# Patient Record
Sex: FEMALE | Race: WHITE | NOT HISPANIC OR LATINO | Employment: OTHER | URBAN - METROPOLITAN AREA
[De-identification: names, ages, dates, MRNs, and addresses within clinical notes are randomized per-mention and may not be internally consistent; named-entity substitution may affect disease eponyms.]

---

## 2017-01-14 ENCOUNTER — HOSPITAL ENCOUNTER (EMERGENCY)
Facility: HOSPITAL | Age: 52
Discharge: HOME | End: 2017-01-14
Payer: COMMERCIAL

## 2017-01-14 ENCOUNTER — APPOINTMENT (EMERGENCY)
Dept: RADIOLOGY | Facility: HOSPITAL | Age: 52
End: 2017-01-14
Payer: COMMERCIAL

## 2017-01-14 VITALS
RESPIRATION RATE: 20 BRPM | SYSTOLIC BLOOD PRESSURE: 187 MMHG | OXYGEN SATURATION: 100 % | HEART RATE: 79 BPM | WEIGHT: 222 LBS | BODY MASS INDEX: 34.77 KG/M2 | TEMPERATURE: 99.8 F | DIASTOLIC BLOOD PRESSURE: 89 MMHG

## 2017-01-14 DIAGNOSIS — S89.92XA KNEE INJURY, LEFT, INITIAL ENCOUNTER: Primary | ICD-10-CM

## 2017-01-14 PROCEDURE — 99284 EMERGENCY DEPT VISIT MOD MDM: CPT

## 2017-01-14 PROCEDURE — 73564 X-RAY EXAM KNEE 4 OR MORE: CPT

## 2017-03-29 ENCOUNTER — ALLSCRIPTS OFFICE VISIT (OUTPATIENT)
Dept: OTHER | Facility: OTHER | Age: 52
End: 2017-03-29

## 2017-04-26 ENCOUNTER — APPOINTMENT (EMERGENCY)
Dept: RADIOLOGY | Facility: HOSPITAL | Age: 52
End: 2017-04-26
Payer: COMMERCIAL

## 2017-04-26 ENCOUNTER — HOSPITAL ENCOUNTER (EMERGENCY)
Facility: HOSPITAL | Age: 52
Discharge: HOME | End: 2017-04-26
Admitting: EMERGENCY MEDICINE
Payer: COMMERCIAL

## 2017-04-26 VITALS
OXYGEN SATURATION: 98 % | RESPIRATION RATE: 18 BRPM | TEMPERATURE: 99 F | DIASTOLIC BLOOD PRESSURE: 92 MMHG | HEART RATE: 58 BPM | SYSTOLIC BLOOD PRESSURE: 143 MMHG

## 2017-04-26 DIAGNOSIS — T14.8XXA MUSCLE STRAIN: Primary | ICD-10-CM

## 2017-04-26 PROCEDURE — 71020 HB CHEST X-RAY 2VW FRONTAL&LATL: CPT

## 2017-04-26 PROCEDURE — 99283 EMERGENCY DEPT VISIT LOW MDM: CPT

## 2017-04-26 PROCEDURE — 73030 X-RAY EXAM OF SHOULDER: CPT

## 2017-04-26 RX ORDER — OXYCODONE HYDROCHLORIDE AND ACETAMINOPHEN 5; 325 MG/1; MG/1
1 TABLET ORAL ONCE
Status: COMPLETED | OUTPATIENT
Start: 2017-04-26 | End: 2017-04-26

## 2017-04-26 RX ADMIN — OXYCODONE HYDROCHLORIDE AND ACETAMINOPHEN 1 TABLET: 5; 325 TABLET ORAL at 13:06

## 2017-05-10 ENCOUNTER — GENERIC CONVERSION - ENCOUNTER (OUTPATIENT)
Dept: OTHER | Facility: OTHER | Age: 52
End: 2017-05-10

## 2017-05-10 ENCOUNTER — APPOINTMENT (EMERGENCY)
Dept: RADIOLOGY | Facility: HOSPITAL | Age: 52
End: 2017-05-10
Payer: COMMERCIAL

## 2017-05-10 ENCOUNTER — HOSPITAL ENCOUNTER (OUTPATIENT)
Facility: HOSPITAL | Age: 52
Setting detail: OBSERVATION
Discharge: HOME | End: 2017-05-11
Attending: EMERGENCY MEDICINE | Admitting: INTERNAL MEDICINE
Payer: COMMERCIAL

## 2017-05-10 DIAGNOSIS — R77.8 ELEVATED TROPONIN: ICD-10-CM

## 2017-05-10 DIAGNOSIS — Z45.02 ICD (IMPLANTABLE CARDIOVERTER-DEFIBRILLATOR) DISCHARGE: Primary | ICD-10-CM

## 2017-05-10 DIAGNOSIS — I10 ESSENTIAL HYPERTENSION: Chronic | ICD-10-CM

## 2017-05-10 DIAGNOSIS — R42 DIZZINESS: ICD-10-CM

## 2017-05-10 LAB
ANION GAP SERPL CALCULATED.3IONS-SCNC: 8 MMOL/L (ref 4–13)
APTT PPP: 37 SECONDS (ref 23–35)
ATRIAL RATE: 60 BPM
BASOPHILS # BLD AUTO: 0.03 THOUSANDS/ΜL (ref 0–0.1)
BASOPHILS NFR BLD AUTO: 1 % (ref 0–1)
BUN SERPL-MCNC: 17 MG/DL (ref 5–25)
CALCIUM SERPL-MCNC: 8.9 MG/DL (ref 8.3–10.1)
CHLORIDE SERPL-SCNC: 103 MMOL/L (ref 100–108)
CO2 SERPL-SCNC: 30 MMOL/L (ref 21–32)
CREAT SERPL-MCNC: 1.1 MG/DL (ref 0.6–1.3)
EOSINOPHIL # BLD AUTO: 0.12 THOUSAND/ΜL (ref 0–0.61)
EOSINOPHIL NFR BLD AUTO: 2 % (ref 0–6)
ERYTHROCYTE [DISTWIDTH] IN BLOOD BY AUTOMATED COUNT: 13.6 % (ref 11.6–15.1)
GFR SERPL CREATININE-BSD FRML MDRD: 52.2 ML/MIN/1.73SQ M
GLUCOSE SERPL-MCNC: 97 MG/DL (ref 65–140)
HCT VFR BLD AUTO: 39.7 % (ref 34.8–46.1)
HGB BLD-MCNC: 13 G/DL (ref 11.5–15.4)
INR PPP: 1.74 (ref 0.86–1.16)
LYMPHOCYTES # BLD AUTO: 1.44 THOUSANDS/ΜL (ref 0.6–4.47)
LYMPHOCYTES NFR BLD AUTO: 24 % (ref 14–44)
MCH RBC QN AUTO: 27.5 PG (ref 26.8–34.3)
MCHC RBC AUTO-ENTMCNC: 32.7 G/DL (ref 31.4–37.4)
MCV RBC AUTO: 84 FL (ref 82–98)
MONOCYTES # BLD AUTO: 0.35 THOUSAND/ΜL (ref 0.17–1.22)
MONOCYTES NFR BLD AUTO: 6 % (ref 4–12)
NEUTROPHILS # BLD AUTO: 4.12 THOUSANDS/ΜL (ref 1.85–7.62)
NEUTS SEG NFR BLD AUTO: 67 % (ref 43–75)
NT-PROBNP SERPL-MCNC: 1573 PG/ML
P AXIS: 62 DEGREES
PLATELET # BLD AUTO: 144 THOUSANDS/UL (ref 149–390)
PMV BLD AUTO: 11.1 FL (ref 8.9–12.7)
POTASSIUM SERPL-SCNC: 4.4 MMOL/L (ref 3.5–5.3)
PR INTERVAL: 132 MS
PROTHROMBIN TIME: 21 SECONDS (ref 12.1–14.4)
QRS AXIS: -6 DEGREES
QRSD INTERVAL: 116 MS
QT INTERVAL: 434 MS
QTC INTERVAL: 434 MS
RBC # BLD AUTO: 4.73 MILLION/UL (ref 3.81–5.12)
SODIUM SERPL-SCNC: 141 MMOL/L (ref 136–145)
T WAVE AXIS: 186 DEGREES
TROPONIN I SERPL-MCNC: 0.05 NG/ML
TROPONIN I SERPL-MCNC: 0.05 NG/ML
TROPONIN I SERPL-MCNC: 0.1 NG/ML
VENTRICULAR RATE: 60 BPM
WBC # BLD AUTO: 6.06 THOUSAND/UL (ref 4.31–10.16)

## 2017-05-10 PROCEDURE — 84484 ASSAY OF TROPONIN QUANT: CPT | Performed by: EMERGENCY MEDICINE

## 2017-05-10 PROCEDURE — 83880 ASSAY OF NATRIURETIC PEPTIDE: CPT | Performed by: EMERGENCY MEDICINE

## 2017-05-10 PROCEDURE — 80048 BASIC METABOLIC PNL TOTAL CA: CPT | Performed by: EMERGENCY MEDICINE

## 2017-05-10 PROCEDURE — 93005 ELECTROCARDIOGRAM TRACING: CPT

## 2017-05-10 PROCEDURE — 99285 EMERGENCY DEPT VISIT HI MDM: CPT

## 2017-05-10 PROCEDURE — 71010 HB CHEST X-RAY 1 VIEW FRONTAL (PORTABLE): CPT

## 2017-05-10 PROCEDURE — 85025 COMPLETE CBC W/AUTO DIFF WBC: CPT | Performed by: EMERGENCY MEDICINE

## 2017-05-10 PROCEDURE — 36415 COLL VENOUS BLD VENIPUNCTURE: CPT | Performed by: EMERGENCY MEDICINE

## 2017-05-10 PROCEDURE — 85730 THROMBOPLASTIN TIME PARTIAL: CPT | Performed by: EMERGENCY MEDICINE

## 2017-05-10 PROCEDURE — 85610 PROTHROMBIN TIME: CPT | Performed by: EMERGENCY MEDICINE

## 2017-05-10 PROCEDURE — 84484 ASSAY OF TROPONIN QUANT: CPT | Performed by: PHYSICIAN ASSISTANT

## 2017-05-10 RX ORDER — MELOXICAM 15 MG/1
7.5 TABLET ORAL DAILY
COMMUNITY
End: 2018-12-26

## 2017-05-10 RX ORDER — FAMOTIDINE 20 MG/1
20 TABLET, FILM COATED ORAL DAILY
COMMUNITY
End: 2020-05-11 | Stop reason: ALTCHOICE

## 2017-05-10 RX ORDER — LISINOPRIL 10 MG/1
10 TABLET ORAL DAILY
Status: DISCONTINUED | OUTPATIENT
Start: 2017-05-11 | End: 2017-05-11 | Stop reason: HOSPADM

## 2017-05-10 RX ORDER — HYDROCHLOROTHIAZIDE 12.5 MG/1
12.5 TABLET ORAL DAILY
Status: DISCONTINUED | OUTPATIENT
Start: 2017-05-10 | End: 2017-05-11

## 2017-05-10 RX ORDER — AMLODIPINE BESYLATE 10 MG/1
10 TABLET ORAL DAILY
Status: DISCONTINUED | OUTPATIENT
Start: 2017-05-11 | End: 2017-05-11 | Stop reason: HOSPADM

## 2017-05-10 RX ORDER — ASPIRIN 325 MG
325 TABLET ORAL ONCE
Status: COMPLETED | OUTPATIENT
Start: 2017-05-10 | End: 2017-05-10

## 2017-05-10 RX ORDER — ACETAMINOPHEN 325 MG/1
650 TABLET ORAL EVERY 6 HOURS PRN
Status: DISCONTINUED | OUTPATIENT
Start: 2017-05-10 | End: 2017-05-11 | Stop reason: HOSPADM

## 2017-05-10 RX ORDER — NICOTINE 21 MG/24HR
1 PATCH, TRANSDERMAL 24 HOURS TRANSDERMAL DAILY
Status: DISCONTINUED | OUTPATIENT
Start: 2017-05-11 | End: 2017-05-10

## 2017-05-10 RX ORDER — METOPROLOL SUCCINATE 25 MG/1
25 TABLET, EXTENDED RELEASE ORAL 2 TIMES DAILY
Status: DISCONTINUED | OUTPATIENT
Start: 2017-05-10 | End: 2017-05-11 | Stop reason: HOSPADM

## 2017-05-10 RX ORDER — NICOTINE 21 MG/24HR
1 PATCH, TRANSDERMAL 24 HOURS TRANSDERMAL DAILY
Status: DISCONTINUED | OUTPATIENT
Start: 2017-05-10 | End: 2017-05-11 | Stop reason: HOSPADM

## 2017-05-10 RX ORDER — METOPROLOL SUCCINATE 25 MG/1
25 TABLET, EXTENDED RELEASE ORAL 2 TIMES DAILY
COMMUNITY
End: 2017-09-12

## 2017-05-10 RX ORDER — FAMOTIDINE 20 MG/1
20 TABLET, FILM COATED ORAL DAILY
Status: DISCONTINUED | OUTPATIENT
Start: 2017-05-11 | End: 2017-05-11 | Stop reason: HOSPADM

## 2017-05-10 RX ADMIN — NICOTINE 1 PATCH: 21 PATCH, EXTENDED RELEASE TRANSDERMAL at 18:12

## 2017-05-10 RX ADMIN — METOPROLOL SUCCINATE 25 MG: 25 TABLET, EXTENDED RELEASE ORAL at 18:12

## 2017-05-10 RX ADMIN — ACETAMINOPHEN 650 MG: 325 TABLET, FILM COATED ORAL at 16:36

## 2017-05-10 RX ADMIN — ASPIRIN 325 MG: 325 TABLET ORAL at 11:26

## 2017-05-11 VITALS
HEIGHT: 67 IN | SYSTOLIC BLOOD PRESSURE: 158 MMHG | BODY MASS INDEX: 33.91 KG/M2 | WEIGHT: 216.05 LBS | OXYGEN SATURATION: 94 % | HEART RATE: 59 BPM | DIASTOLIC BLOOD PRESSURE: 78 MMHG | TEMPERATURE: 98 F | RESPIRATION RATE: 16 BRPM

## 2017-05-11 LAB
ANION GAP SERPL CALCULATED.3IONS-SCNC: 8 MMOL/L (ref 4–13)
BUN SERPL-MCNC: 17 MG/DL (ref 5–25)
CALCIUM SERPL-MCNC: 8.7 MG/DL (ref 8.3–10.1)
CHLORIDE SERPL-SCNC: 104 MMOL/L (ref 100–108)
CO2 SERPL-SCNC: 28 MMOL/L (ref 21–32)
CREAT SERPL-MCNC: 1.14 MG/DL (ref 0.6–1.3)
GFR SERPL CREATININE-BSD FRML MDRD: 50.1 ML/MIN/1.73SQ M
GLUCOSE P FAST SERPL-MCNC: 106 MG/DL (ref 65–99)
GLUCOSE SERPL-MCNC: 106 MG/DL (ref 65–140)
POTASSIUM SERPL-SCNC: 4.1 MMOL/L (ref 3.5–5.3)
SODIUM SERPL-SCNC: 140 MMOL/L (ref 136–145)

## 2017-05-11 PROCEDURE — 80048 BASIC METABOLIC PNL TOTAL CA: CPT | Performed by: NURSE PRACTITIONER

## 2017-05-11 RX ORDER — HYDROCHLOROTHIAZIDE 12.5 MG/1
12.5 TABLET ORAL DAILY
Status: DISCONTINUED | OUTPATIENT
Start: 2017-05-11 | End: 2017-05-11 | Stop reason: HOSPADM

## 2017-05-11 RX ORDER — NICOTINE 21 MG/24HR
1 PATCH, TRANSDERMAL 24 HOURS TRANSDERMAL DAILY
Qty: 30 PATCH | Refills: 0 | Status: SHIPPED | OUTPATIENT
Start: 2017-05-11 | End: 2018-08-06

## 2017-05-11 RX ORDER — HYDROCHLOROTHIAZIDE 12.5 MG/1
12.5 TABLET ORAL DAILY
Qty: 30 TABLET | Refills: 0 | Status: SHIPPED | OUTPATIENT
Start: 2017-05-11 | End: 2018-12-26

## 2017-05-11 RX ADMIN — RIVAROXABAN 20 MG: 20 TABLET, FILM COATED ORAL at 08:24

## 2017-05-11 RX ADMIN — METOPROLOL SUCCINATE 25 MG: 25 TABLET, EXTENDED RELEASE ORAL at 08:24

## 2017-05-11 RX ADMIN — AMLODIPINE BESYLATE 10 MG: 10 TABLET ORAL at 08:24

## 2017-05-11 RX ADMIN — HYDROCHLOROTHIAZIDE 12.5 MG: 12.5 TABLET ORAL at 08:24

## 2017-05-11 RX ADMIN — NICOTINE 1 PATCH: 21 PATCH, EXTENDED RELEASE TRANSDERMAL at 08:23

## 2017-05-11 RX ADMIN — ACETAMINOPHEN 650 MG: 325 TABLET, FILM COATED ORAL at 05:29

## 2017-05-11 RX ADMIN — FAMOTIDINE 20 MG: 20 TABLET ORAL at 08:24

## 2017-05-11 RX ADMIN — LISINOPRIL 10 MG: 10 TABLET ORAL at 08:24

## 2017-07-27 ENCOUNTER — ALLSCRIPTS OFFICE VISIT (OUTPATIENT)
Dept: OTHER | Facility: OTHER | Age: 52
End: 2017-07-27

## 2017-08-19 ENCOUNTER — HOSPITAL ENCOUNTER (EMERGENCY)
Facility: HOSPITAL | Age: 52
Discharge: HOME | End: 2017-08-19
Attending: EMERGENCY MEDICINE
Payer: COMMERCIAL

## 2017-08-19 ENCOUNTER — APPOINTMENT (EMERGENCY)
Dept: RADIOLOGY | Facility: HOSPITAL | Age: 52
End: 2017-08-19
Payer: COMMERCIAL

## 2017-08-19 VITALS
DIASTOLIC BLOOD PRESSURE: 83 MMHG | WEIGHT: 216.8 LBS | BODY MASS INDEX: 34.03 KG/M2 | SYSTOLIC BLOOD PRESSURE: 143 MMHG | OXYGEN SATURATION: 98 % | RESPIRATION RATE: 18 BRPM | HEIGHT: 67 IN | HEART RATE: 60 BPM | TEMPERATURE: 99.2 F
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
## 2017-08-19 DIAGNOSIS — S30.0XXA CONTUSION OF BUTTOCK, INITIAL ENCOUNTER: ICD-10-CM

## 2017-08-19 PROCEDURE — 99284 EMERGENCY DEPT VISIT MOD MDM: CPT

## 2017-08-19 PROCEDURE — 73502 X-RAY EXAM HIP UNI 2-3 VIEWS: CPT

## 2017-08-19 RX ORDER — MORPHINE SULFATE 30 MG/1
30 TABLET ORAL EVERY 4 HOURS PRN
Qty: 5 TABLET | Refills: 0 | Status: ON HOLD | OUTPATIENT
Start: 2017-08-19 | End: 2018-05-02

## 2017-08-19 RX ORDER — MORPHINE SULFATE 15 MG/1
30 TABLET ORAL ONCE
Status: COMPLETED | OUTPATIENT
Start: 2017-08-19 | End: 2017-08-19

## 2017-08-19 RX ADMIN — MORPHINE SULFATE 30 MG: 15 TABLET ORAL at 15:30

## 2017-09-12 ENCOUNTER — HOSPITAL ENCOUNTER (EMERGENCY)
Facility: HOSPITAL | Age: 52
Discharge: HOME | End: 2017-09-12
Attending: EMERGENCY MEDICINE | Admitting: EMERGENCY MEDICINE
Payer: COMMERCIAL

## 2017-09-12 VITALS
TEMPERATURE: 98.5 F | RESPIRATION RATE: 18 BRPM | SYSTOLIC BLOOD PRESSURE: 142 MMHG | HEART RATE: 75 BPM | WEIGHT: 215.83 LBS | BODY MASS INDEX: 33.8 KG/M2 | DIASTOLIC BLOOD PRESSURE: 81 MMHG | OXYGEN SATURATION: 95 %

## 2017-09-12 DIAGNOSIS — Z45.02 DEFIBRILLATOR DISCHARGE: ICD-10-CM

## 2017-09-12 DIAGNOSIS — I48.91 ATRIAL FIBRILLATION WITH RVR (HCC): Primary | ICD-10-CM

## 2017-09-12 LAB
ALBUMIN SERPL BCP-MCNC: 3.8 G/DL (ref 3.5–5)
ALP SERPL-CCNC: 67 U/L (ref 46–116)
ALT SERPL W P-5'-P-CCNC: 39 U/L (ref 12–78)
ANION GAP SERPL CALCULATED.3IONS-SCNC: 10 MMOL/L (ref 4–13)
APTT PPP: 40 SECONDS (ref 23–35)
AST SERPL W P-5'-P-CCNC: 27 U/L (ref 5–45)
ATRIAL RATE: 76 BPM
BASOPHILS # BLD AUTO: 0.02 THOUSANDS/ΜL (ref 0–0.1)
BASOPHILS NFR BLD AUTO: 0 % (ref 0–1)
BILIRUB SERPL-MCNC: 0.3 MG/DL (ref 0.2–1)
BUN SERPL-MCNC: 22 MG/DL (ref 5–25)
CALCIUM SERPL-MCNC: 9.3 MG/DL (ref 8.3–10.1)
CHLORIDE SERPL-SCNC: 105 MMOL/L (ref 100–108)
CO2 SERPL-SCNC: 26 MMOL/L (ref 21–32)
CREAT SERPL-MCNC: 1.57 MG/DL (ref 0.6–1.3)
EOSINOPHIL # BLD AUTO: 0.06 THOUSAND/ΜL (ref 0–0.61)
EOSINOPHIL NFR BLD AUTO: 1 % (ref 0–6)
ERYTHROCYTE [DISTWIDTH] IN BLOOD BY AUTOMATED COUNT: 13.1 % (ref 11.6–15.1)
GFR SERPL CREATININE-BSD FRML MDRD: 38 ML/MIN/1.73SQ M
GLUCOSE SERPL-MCNC: 100 MG/DL (ref 65–140)
HCT VFR BLD AUTO: 36.3 % (ref 34.8–46.1)
HGB BLD-MCNC: 11.9 G/DL (ref 11.5–15.4)
INR PPP: 2.15 (ref 0.86–1.16)
LYMPHOCYTES # BLD AUTO: 1.35 THOUSANDS/ΜL (ref 0.6–4.47)
LYMPHOCYTES NFR BLD AUTO: 21 % (ref 14–44)
MCH RBC QN AUTO: 27.9 PG (ref 26.8–34.3)
MCHC RBC AUTO-ENTMCNC: 32.8 G/DL (ref 31.4–37.4)
MCV RBC AUTO: 85 FL (ref 82–98)
MONOCYTES # BLD AUTO: 0.49 THOUSAND/ΜL (ref 0.17–1.22)
MONOCYTES NFR BLD AUTO: 8 % (ref 4–12)
NEUTROPHILS # BLD AUTO: 4.64 THOUSANDS/ΜL (ref 1.85–7.62)
NEUTS SEG NFR BLD AUTO: 70 % (ref 43–75)
P AXIS: 70 DEGREES
PLATELET # BLD AUTO: 143 THOUSANDS/UL (ref 149–390)
PMV BLD AUTO: 11.5 FL (ref 8.9–12.7)
POTASSIUM SERPL-SCNC: 3.7 MMOL/L (ref 3.5–5.3)
PR INTERVAL: 148 MS
PROT SERPL-MCNC: 7.6 G/DL (ref 6.4–8.2)
PROTHROMBIN TIME: 24.8 SECONDS (ref 12.1–14.4)
QRS AXIS: -19 DEGREES
QRSD INTERVAL: 114 MS
QT INTERVAL: 386 MS
QTC INTERVAL: 434 MS
RBC # BLD AUTO: 4.26 MILLION/UL (ref 3.81–5.12)
SODIUM SERPL-SCNC: 141 MMOL/L (ref 136–145)
T WAVE AXIS: 147 DEGREES
TROPONIN I SERPL-MCNC: 0.03 NG/ML
VENTRICULAR RATE: 76 BPM
WBC # BLD AUTO: 6.56 THOUSAND/UL (ref 4.31–10.16)

## 2017-09-12 PROCEDURE — 80053 COMPREHEN METABOLIC PANEL: CPT | Performed by: EMERGENCY MEDICINE

## 2017-09-12 PROCEDURE — 96374 THER/PROPH/DIAG INJ IV PUSH: CPT

## 2017-09-12 PROCEDURE — 96375 TX/PRO/DX INJ NEW DRUG ADDON: CPT

## 2017-09-12 PROCEDURE — 85730 THROMBOPLASTIN TIME PARTIAL: CPT | Performed by: EMERGENCY MEDICINE

## 2017-09-12 PROCEDURE — 36415 COLL VENOUS BLD VENIPUNCTURE: CPT | Performed by: EMERGENCY MEDICINE

## 2017-09-12 PROCEDURE — 93005 ELECTROCARDIOGRAM TRACING: CPT | Performed by: EMERGENCY MEDICINE

## 2017-09-12 PROCEDURE — 85025 COMPLETE CBC W/AUTO DIFF WBC: CPT | Performed by: EMERGENCY MEDICINE

## 2017-09-12 PROCEDURE — 99284 EMERGENCY DEPT VISIT MOD MDM: CPT

## 2017-09-12 PROCEDURE — 96361 HYDRATE IV INFUSION ADD-ON: CPT

## 2017-09-12 PROCEDURE — 93005 ELECTROCARDIOGRAM TRACING: CPT

## 2017-09-12 PROCEDURE — 85610 PROTHROMBIN TIME: CPT | Performed by: EMERGENCY MEDICINE

## 2017-09-12 PROCEDURE — 84484 ASSAY OF TROPONIN QUANT: CPT | Performed by: EMERGENCY MEDICINE

## 2017-09-12 RX ORDER — METOCLOPRAMIDE HYDROCHLORIDE 5 MG/ML
10 INJECTION INTRAMUSCULAR; INTRAVENOUS ONCE
Status: COMPLETED | OUTPATIENT
Start: 2017-09-12 | End: 2017-09-12

## 2017-09-12 RX ORDER — KETOROLAC TROMETHAMINE 30 MG/ML
15 INJECTION, SOLUTION INTRAMUSCULAR; INTRAVENOUS ONCE
Status: COMPLETED | OUTPATIENT
Start: 2017-09-12 | End: 2017-09-12

## 2017-09-12 RX ORDER — METOPROLOL SUCCINATE 25 MG/1
25 TABLET, EXTENDED RELEASE ORAL 2 TIMES DAILY
Qty: 20 TABLET | Refills: 0 | Status: ON HOLD | OUTPATIENT
Start: 2017-09-12 | End: 2018-05-02

## 2017-09-12 RX ORDER — DIPHENHYDRAMINE HYDROCHLORIDE 50 MG/ML
25 INJECTION INTRAMUSCULAR; INTRAVENOUS ONCE
Status: COMPLETED | OUTPATIENT
Start: 2017-09-12 | End: 2017-09-12

## 2017-09-12 RX ADMIN — DIPHENHYDRAMINE HYDROCHLORIDE 25 MG: 50 INJECTION, SOLUTION INTRAMUSCULAR; INTRAVENOUS at 16:23

## 2017-09-12 RX ADMIN — SODIUM CHLORIDE 1000 ML: 0.9 INJECTION, SOLUTION INTRAVENOUS at 16:26

## 2017-09-12 RX ADMIN — KETOROLAC TROMETHAMINE 15 MG: 30 INJECTION, SOLUTION INTRAMUSCULAR at 16:23

## 2017-09-12 RX ADMIN — METOCLOPRAMIDE 10 MG: 5 INJECTION, SOLUTION INTRAMUSCULAR; INTRAVENOUS at 16:23

## 2017-11-09 ENCOUNTER — ALLSCRIPTS OFFICE VISIT (OUTPATIENT)
Dept: OTHER | Facility: OTHER | Age: 52
End: 2017-11-09

## 2018-01-12 VITALS
HEIGHT: 68 IN | BODY MASS INDEX: 32.37 KG/M2 | DIASTOLIC BLOOD PRESSURE: 88 MMHG | WEIGHT: 213.56 LBS | SYSTOLIC BLOOD PRESSURE: 136 MMHG | HEART RATE: 60 BPM

## 2018-02-14 ENCOUNTER — CLINICAL SUPPORT (OUTPATIENT)
Dept: CARDIOLOGY CLINIC | Facility: CLINIC | Age: 53
End: 2018-02-14
Payer: COMMERCIAL

## 2018-02-14 DIAGNOSIS — I47.2 VT (VENTRICULAR TACHYCARDIA) (HCC): Primary | ICD-10-CM

## 2018-02-14 DIAGNOSIS — Z95.810 PRESENCE OF IMPLANTABLE CARDIOVERTER-DEFIBRILLATOR (ICD): ICD-10-CM

## 2018-02-14 PROCEDURE — 93296 REM INTERROG EVL PM/IDS: CPT | Performed by: INTERNAL MEDICINE

## 2018-02-14 PROCEDURE — 93295 DEV INTERROG REMOTE 1/2/MLT: CPT | Performed by: INTERNAL MEDICINE

## 2018-02-14 NOTE — PROGRESS NOTES
CARELINK TRANSMISSION:  BATTERY VOLTAGE ADEQUATE (9 5 YR)   AP 39 8%  < 0 1%   ALL AVAILABLE LEAD PARAMETERS WITHIN NORMAL LIMITS   2 NEW AT/AF RECORDINGS (0 2%, LONGEST 4 HR) WITH BOTH EGMS SHOWING AF   OPTI-VOL WITHIN NORMAL LIMITS   NORMAL DEVICE FUNCTION   RG      Current Outpatient Prescriptions:     amLODIPine (NORVASC) 10 mg tablet, Take 10 mg by mouth daily  , Disp: , Rfl:     famotidine (PEPCID) 20 mg tablet, Take 20 mg by mouth daily, Disp: , Rfl:     hydrochlorothiazide (HYDRODIURIL) 12 5 mg tablet, Take 1 tablet by mouth daily for 30 days, Disp: 30 tablet, Rfl: 0    lisinopril (ZESTRIL) 10 mg tablet, Take 10 mg by mouth daily  , Disp: , Rfl:     meloxicam (MOBIC) 15 mg tablet, Take 7 5 mg by mouth daily, Disp: , Rfl:     metoprolol succinate (TOPROL-XL) 25 mg 24 hr tablet, Take 1 tablet by mouth 2 (two) times a day, Disp: 20 tablet, Rfl: 0    morphine (MSIR) 30 MG tablet, Take 1 tablet by mouth every 4 (four) hours as needed for severe pain for up to 5 doses Max Daily Amount: 180 mg, Disp: 5 tablet, Rfl: 0    nicotine (NICODERM CQ) 21 mg/24 hr TD 24 hr patch, Place 1 patch on the skin daily for 30 days, Disp: 30 patch, Rfl: 0    rivaroxaban (XARELTO) 20 mg tablet, Take 20 mg by mouth daily with breakfast  , Disp: , Rfl:

## 2018-03-07 NOTE — PROGRESS NOTES
"  Discussion/Summary  Normal device function     A fib episode lasting 53 sec  Results/Data  Cardiac Device In Clinic [id number] 09:03PM Alex Du     Test Name Result Flag Reference   MISCELLANEOUS COMMENT (Report)     DEVICE INTERROGATED IN THE [facility] OFFICE: BATTERY VOLTAGE ADEQUATE (9 7 YR)  AP 50 5%  < 0 1%  ALL LEAD PARAMETERS WITHIN NORMAL LIMITS  1 AF EPISODE LASTING 53 SEC    NO OTHER HIGH RATE EPISODES  OPTI-VOL WITHIN NORMAL LIMITS  NO PROGRAMMING CHANGES MADE TO DEVICE PARAMETERS  NORMAL ICD FUNCTION  RG   Cardiac Electrophysiology Report      [id number] [id number] [id number]  pdf   DEVICE TYPE ICD       Cardiac Electrophysiology Report [id number] 09:03PM Alex Du     Test Name Result Flag Reference   Cardiac Electrophysiology Report      [id number]  pdf     Signatures   Electronically signed by : Franklyn Prather, ; Nov 9 2017  3:18PM EST                       (Author)    Electronically signed by : EVA Tracy ; Nov 11 2017  9:37AM EST                       (Author)    "

## 2018-03-07 NOTE — PROGRESS NOTES
"  Discussion/Summary  Normal device function      Results/Data  Cardiac Device In Clinic [id number] 05:41PM Jacek Lara     Test Name Result Flag Reference   MISCELLANEOUS COMMENT (Report)     DEVICE INTERROGATED IN THE [facility] OFFICE: BATTERY VOLTAGE ADEQUATE (PRICE - 9 8 YRS); AP = 42%,  = <1%; (3) AT/AF EPISODES FOR PAF NOTED WITH LONGEST DURATION @ 11 HRS; RVR ALSO NOTED DURING EPISODES RECORDED WITH V RATES >100 BPM; PT STATES SHE WAS SEEN @ [facility] w LAST DOCUMENTED EPISODE (10/28/16) - TREATED & RELEASED FOR RAPID PAF/RVR; ALL LEAD PARAMETERS TEST WITHIN NORMAL LIMITS/STABLE; DECREASE MADE TO BOTH RA & RV AMPLITUDE TO PROMOTE DEVICE LONGEVITY WHILE MAINTAINING AN APPROPRIATE SAFETY MARGIN; OPTI-VOL WITHIN NORMAL LIMITS; COMPLIANCE WITH XARELTO & METOPROLOL REVIEWED WITH PATIENT - STATES w/o RX REFILLS AND NONE TAKEN x3 DAYS; REFILLS REQUEST FOR PATIENT PROCESSED; APPROPRIATELY FUNCTIONING ICD  eb   Cardiac Electrophysiology Report      [id number] [id number] [id number]  pdf   DEVICE TYPE ICD       Cardiac Electrophysiology Report [id number] 05:41PM Jacek Lara     Test Name Result Flag Reference   Cardiac Electrophysiology Report      [id number]  pdf     Signatures   Electronically signed by : Alvaro Gonzalez, ; Nov 15 2016 12:52PM EST                       (Author)    Electronically signed by : EVA Martin ; Nov 15 2016  4:39PM EST                       (Author)    "

## 2018-03-07 NOTE — PROGRESS NOTES
"  Discussion/Summary  Normal device function      Results/Data  Cardiac Device In Clinic [id number] 06:22PM Zelphia Lung     Test Name Result Flag Reference   MISCELLANEOUS COMMENT (Report)     WOUND CHECK: INCISION CLEAN AND DRY WITH EDGES APPROXIMATED; WOUND CARE AND RESTRICTIONS REVIEWED WITH PATIENT  PT C/O SORENESS  ADVISED BY DR Taras Tracy TO TAKE MOTRIN & TYLENOL  DEVICE INTERROGATED IN THE [place] OFFICE: BATTERY VOLTAGE ADEQUATE  AP 48 3%  0%  1 AT/AF NOTED, 5:52 HRS  PT WAS SEEN AT [facility] FOR IT  CLAIMS SEEN BY REP & OVERNIGHT STAY  ON [address] [place] [address]  ALL LEAD PARAMETERS & TRENDS WITHIN NORMAL LIMITS  DR Taras Tracy INCREASED NIDS  OPTI-VOL FLUID THRESHOLD BEING INITIATED  NORMAL DEVICE FUNCTION  NC   Cardiac Electrophysiology Report      [id number] [id number] [id number]  pdf   DEVICE TYPE ICD       Cardiac Electrophysiology Report [id number] 06:22PM Zelphia Lung     Test Name Result Flag Reference   Cardiac Electrophysiology Report      [id number]  pdf     Signatures   Electronically signed by : Rina Leo, ; Aug  1 2016 10:35AM EST                       (Author)    Electronically signed by : EVA Champion ; Aug  1 2016 12:44PM EST                       (Author)    "

## 2018-05-01 ENCOUNTER — ANESTHESIA EVENT (OUTPATIENT)
Dept: GASTROENTEROLOGY | Facility: HOSPITAL | Age: 53
End: 2018-05-01
Payer: COMMERCIAL

## 2018-05-01 NOTE — ANESTHESIA PREPROCEDURE EVALUATION
Review of Systems/Medical History  Patient summary reviewed  Chart reviewed  No history of anesthetic complications     Cardiovascular  Pacemaker/AICD (ICD), Hyperlipidemia, Hypertension , Dysrhythmias (Hx A fib) ,   Comment: Hx hypertrophic cardiomyopathy--S/P ICD,  Pulmonary  Smoker (1/2 ppd) cigarette smoker  ,        GI/Hepatic    GERD , Hepatitis C,   Comment: Vomiting/abdominal pain     Negative  ROS        Endo/Other  Negative endo/other ROS      GYN  Negative gynecology ROS          Hematology  Negative hematology ROS      Musculoskeletal  Negative musculoskeletal ROS        Neurology  Negative neurology ROS      Psychology   Negative psychology ROS              Physical Exam    Airway    Mallampati score: II  TM Distance: >3 FB       Dental   Comment: Edentulous,     Cardiovascular  Rhythm: regular, Murmur,     Pulmonary  Breath sounds clear to auscultation,     Other Findings  Hoarse      Anesthesia Plan  ASA Score- 3     Anesthesia Type- IV sedation with anesthesia with ASA Monitors  Additional Monitors:   Airway Plan:         Plan Factors-    Induction- intravenous  Postoperative Plan-     Informed Consent- Anesthetic plan and risks discussed with patient  I personally reviewed this patient with the CRNA  Discussed and agreed on the Anesthesia Plan with the CRNA  Bryce Laird

## 2018-05-02 ENCOUNTER — ANESTHESIA (OUTPATIENT)
Dept: GASTROENTEROLOGY | Facility: HOSPITAL | Age: 53
End: 2018-05-02
Payer: COMMERCIAL

## 2018-05-02 ENCOUNTER — HOSPITAL ENCOUNTER (OUTPATIENT)
Facility: HOSPITAL | Age: 53
Setting detail: OUTPATIENT SURGERY
Discharge: HOME | End: 2018-05-02
Attending: INTERNAL MEDICINE | Admitting: INTERNAL MEDICINE
Payer: COMMERCIAL

## 2018-05-02 VITALS
HEART RATE: 60 BPM | DIASTOLIC BLOOD PRESSURE: 75 MMHG | TEMPERATURE: 98.5 F | BODY MASS INDEX: 35.61 KG/M2 | RESPIRATION RATE: 16 BRPM | WEIGHT: 235 LBS | HEIGHT: 68 IN | OXYGEN SATURATION: 99 % | SYSTOLIC BLOOD PRESSURE: 140 MMHG

## 2018-05-02 DIAGNOSIS — K21.9 GERD (GASTROESOPHAGEAL REFLUX DISEASE): ICD-10-CM

## 2018-05-02 PROCEDURE — 88305 TISSUE EXAM BY PATHOLOGIST: CPT | Performed by: PATHOLOGY

## 2018-05-02 RX ORDER — PROPOFOL 10 MG/ML
INJECTION, EMULSION INTRAVENOUS CONTINUOUS PRN
Status: DISCONTINUED | OUTPATIENT
Start: 2018-05-02 | End: 2018-05-02 | Stop reason: SURG

## 2018-05-02 RX ORDER — PROPOFOL 10 MG/ML
INJECTION, EMULSION INTRAVENOUS AS NEEDED
Status: DISCONTINUED | OUTPATIENT
Start: 2018-05-02 | End: 2018-05-02 | Stop reason: SURG

## 2018-05-02 RX ORDER — FENTANYL CITRATE 50 UG/ML
INJECTION, SOLUTION INTRAMUSCULAR; INTRAVENOUS AS NEEDED
Status: DISCONTINUED | OUTPATIENT
Start: 2018-05-02 | End: 2018-05-02 | Stop reason: SURG

## 2018-05-02 RX ORDER — SODIUM CHLORIDE 9 MG/ML
50 INJECTION, SOLUTION INTRAVENOUS CONTINUOUS
Status: DISCONTINUED | OUTPATIENT
Start: 2018-05-02 | End: 2018-05-02 | Stop reason: HOSPADM

## 2018-05-02 RX ADMIN — PROPOFOL 120 MG: 10 INJECTION, EMULSION INTRAVENOUS at 13:17

## 2018-05-02 RX ADMIN — SODIUM CHLORIDE 50 ML/HR: 0.9 INJECTION, SOLUTION INTRAVENOUS at 12:07

## 2018-05-02 RX ADMIN — PROPOFOL 150 MCG/KG/MIN: 10 INJECTION, EMULSION INTRAVENOUS at 13:17

## 2018-05-02 RX ADMIN — SODIUM CHLORIDE: 0.9 INJECTION, SOLUTION INTRAVENOUS at 13:07

## 2018-05-02 RX ADMIN — FENTANYL CITRATE 160 MCG: 50 INJECTION, SOLUTION INTRAMUSCULAR; INTRAVENOUS at 13:22

## 2018-05-02 RX ADMIN — TOPICAL ANESTHETIC 1 SPRAY: 200 SPRAY DENTAL; PERIODONTAL at 13:12

## 2018-05-02 NOTE — OP NOTE
**** GI/ENDOSCOPY REPORT ****     PATIENT NAME: CHAD UREÑA - VISIT ID:     INTRODUCTION: Esophagogastroduodenoscopy - A 48 female patient presents   for an outpatient Esophagogastroduodenoscopy at [address] [place] [address] 59  N  INDICATIONS: Pain located in the epigastrium  Vomiting  CONSENT: The benefits, risks, and alternatives to the procedure were   discussed and informed consent was obtained from the patient  PREPARATION:  EKG, pulse, pulse oximetry and blood pressure were monitored   throughout the procedure  MEDICATIONS: Anesthesia-check records     PROCEDURE:  The endoscope was passed with ease under direct visualization   and advanced to the 3rd portion of the duodenum  The scope was withdrawn   and the mucosa was carefully examined  The views were excellent  The   patient's toleration of the procedure was excellent  FINDINGS:  In the larynx there was a small polyp on the vocal cords  Esophagus: Mild, striped, erosive, reflux-induced esophagitis was found in   the distal third of the esophagus  A possible tongue of Thayer's   esophagus was found, spanning, 2 cm beginning 36 cm from the entry site  Three biopsies was taken (jar 2)  Otherwise, the esophagus appeared to be   normal  There was no evidence of stenosis or rings or bands in the   esophagus  Stomach: The stomach appeared to be normal   Duodenum:   Possibly mild "scalloping" of the mucosa  Four biopsies was taken (jar 1)  Otherwise, the duodenum appeared to be normal      COMPLICATIONS: There were no complications  IMPRESSIONS: In the larynx there was a small polyp on the vocal cords  Mild esophagitis seen in the distal third of the esophagus  Thayer's   esophagus noted  Three biopsies taken  No evidence of stenosis and rings   or bands in the esophagus  Normal stomach  Possibly mild "scalloping" of   the mucosa  Four biopsies taken       ESTIMATED BLOOD LOSS:     RECOMMENDATIONS: Call   Sugar Cotton [phone] with questions or   problems  Follow-up appointment with Dr Sugar Cotton in 2-4 week  Follow-up on the results of the biopsy specimens  ENT consultation   regarding vocal cord polyp, Dr Kathya Brunner [phone] please call for the   appointment  PROCEDURE CODES: 14283 - EGD flexible; with biopsy     ICD-9 Codes: 789 06 Abdominal pain, epigastric 787 03 Vomiting alone    530 85 Thayer's esophagus     ICD-10 Codes: R10 13 Epigastric pain R11 1 Vomiting K20 9 Esophagitis,   unspecified K22 7 Thayer's esophagus     PERFORMED BY: EVA Trevizo  on 05/02/2018  Version 1, electronically signed by EVA Trevizo  on   05/02/2018 at 13:38

## 2018-05-02 NOTE — H&P
History and Physical - [facility]  Leslye Wells 48 y o  female MRN: [id number]                  HPI: Leslye Wells is a 48y o  year old female who presents for upper abdominal pain, regurgitation, vomiting  REVIEW OF SYSTEMS: Per the HPI, and otherwise unremarkable  Historical Information   Past Medical History:   Diagnosis Date    Atrial fibrillation ([facility] [address] )     Atrial fibrillation (HCC)     GERD (gastroesophageal reflux disease)     Hepatitis C     Hyperlipidemia     Hypertension     Pacemaker      Past Surgical History:   Procedure Laterality Date    CARDIAC PACEMAKER PLACEMENT      CHOLECYSTECTOMY      ELBOW SURGERY      HYSTERECTOMY      HYSTERECTOMY      KNEE SURGERY Left     REPLACEMENT TOTAL KNEE Left      Social History   History   Alcohol Use No     History   Drug Use No     History   Smoking Status    Current Every Day Smoker    Packs/day: 0 50    Years: 0 00    Types: Cigarettes   Smokeless Tobacco    Current User     History reviewed  No pertinent family history  Meds/Allergies     Prescriptions Prior to Admission   Medication    amLODIPine (NORVASC) 10 mg tablet    lisinopril (ZESTRIL) 10 mg tablet    meloxicam (MOBIC) 15 mg tablet    rivaroxaban (XARELTO) 20 mg tablet    famotidine (PEPCID) 20 mg tablet    hydrochlorothiazide (HYDRODIURIL) 12 5 mg tablet    nicotine (NICODERM CQ) 21 mg/24 hr TD 24 hr patch       Allergies   Allergen Reactions    Iodinated Diagnostic Agents     Tramadol        Objective     Blood pressure 137/73, pulse 64, temperature 98 5 °F (36 9 °C), temperature source Temporal, resp  rate 20, height 5' 7 5" (1 715 m), weight 107 kg (235 lb), SpO2 98 %, not currently breastfeeding  PHYSICAL EXAM    Gen: NAD  CV: RRR  CHEST: Clear  ABD: soft, NT/ND  EXT: no edema      ASSESSMENT/PLAN:  This is a 48y o  year old female here for the evaluation of upper abdominal pain, vomiting, regurgitation      PLAN: EGD  Procedure:

## 2018-05-02 NOTE — ANESTHESIA POSTPROCEDURE EVALUATION
Post-Op Assessment Note      CV Status:  Stable    Mental Status:  Alert and awake    Hydration Status:  Euvolemic    PONV Controlled:  Controlled    Airway Patency:  Patent    Post Op Vitals Reviewed: Yes          Staff: CRNA, Anesthesiologist           /61 (05/02/18 1330)    Temp      Pulse 60 (05/02/18 1330)   Resp 16 (05/02/18 1330)    SpO2 100 % (05/02/18 1330)

## 2018-05-02 NOTE — OP NOTE
**** GI/ENDOSCOPY REPORT ****     PATIENT NAME: CHAD UREÑA - VISIT ID:     INTRODUCTION: Esophagogastroduodenoscopy - A 48 female patient presents   for an outpatient Esophagogastroduodenoscopy at [address] [place] [address] 59  N  INDICATIONS: Pain located in the epigastrium  Vomiting  CONSENT: The benefits, risks, and alternatives to the procedure were   discussed and informed consent was obtained from the patient  PREPARATION:  EKG, pulse, pulse oximetry and blood pressure were monitored   throughout the procedure  MEDICATIONS: Anesthesia-check records     PROCEDURE:  The endoscope was passed with ease under direct visualization   and advanced to the 3rd portion of the duodenum  The scope was withdrawn   and the mucosa was carefully examined  The views were excellent  The   patient's toleration of the procedure was excellent  FINDINGS:  In the larynx there was a small polyp on the vocal cords  Esophagus: Mild, striped, erosive, reflux-induced esophagitis was found in   the distal third of the esophagus  A possible tongue of Thayer's   esophagus was found, spanning, 2 cm beginning 36 cm from the entry site  Three biopsies was taken (jar 2)  Otherwise, the esophagus appeared to be   normal  There was no evidence of stenosis or rings or bands in the   esophagus  Stomach: The stomach appeared to be normal   Duodenum:   Possibly mild "scalloping" of the mucosa  Four biopsies was taken (jar 1)  Otherwise, the duodenum appeared to be normal      COMPLICATIONS: There were no complications  IMPRESSIONS: In the larynx there was a small polyp on the vocal cords  Mild esophagitis seen in the distal third of the esophagus  Thayer's   esophagus noted  Three biopsies taken  No evidence of stenosis and rings   or bands in the esophagus  Normal stomach  Possibly mild "scalloping" of   the mucosa  Four biopsies taken       ESTIMATED BLOOD LOSS:     RECOMMENDATIONS: Call   Roro Stapleton [phone] with questions or   problems  Follow-up appointment with Dr Roro Stapleton in 2-4 week  Follow-up on the results of the biopsy specimens  ENT consultation   regarding vocal cord polyp, Dr Sam Sarmiento [phone] please call for the   appointment  Continue taking the following medications as prescribed:   Protonix  Resume Xarelto today  PROCEDURE CODES: 53188 - EGD flexible; with biopsy     ICD-9 Codes: 789 06 Abdominal pain, epigastric 787 03 Vomiting alone    530 85 Thayer's esophagus     ICD-10 Codes: R10 13 Epigastric pain R11 1 Vomiting K20 9 Esophagitis,   unspecified K22 7 Thayer's esophagus     PERFORMED BY: EVA Maldonado  on 05/02/2018  Version 2, modified and electronically signed by EVA Maldonado  on 05/02/2018 at 14:03, superseding version 1 signed on 05/02/2018 at   13:38

## 2018-05-03 ENCOUNTER — APPOINTMENT (EMERGENCY)
Dept: RADIOLOGY | Facility: HOSPITAL | Age: 53
End: 2018-05-03
Payer: COMMERCIAL

## 2018-05-03 ENCOUNTER — HOSPITAL ENCOUNTER (EMERGENCY)
Facility: HOSPITAL | Age: 53
Discharge: HOME | End: 2018-05-03
Attending: EMERGENCY MEDICINE | Admitting: EMERGENCY MEDICINE
Payer: COMMERCIAL

## 2018-05-03 VITALS
SYSTOLIC BLOOD PRESSURE: 156 MMHG | RESPIRATION RATE: 18 BRPM | OXYGEN SATURATION: 95 % | HEART RATE: 65 BPM | TEMPERATURE: 97.9 F | DIASTOLIC BLOOD PRESSURE: 83 MMHG

## 2018-05-03 DIAGNOSIS — B35.4 TINEA CORPORIS: ICD-10-CM

## 2018-05-03 DIAGNOSIS — S46.912A STRAIN OF LEFT SHOULDER, INITIAL ENCOUNTER: Primary | ICD-10-CM

## 2018-05-03 PROCEDURE — 73030 X-RAY EXAM OF SHOULDER: CPT

## 2018-05-03 PROCEDURE — 99283 EMERGENCY DEPT VISIT LOW MDM: CPT

## 2018-05-03 PROCEDURE — 96372 THER/PROPH/DIAG INJ SC/IM: CPT

## 2018-05-03 RX ORDER — CLOTRIMAZOLE AND BETAMETHASONE DIPROPIONATE 10; .64 MG/G; MG/G
CREAM TOPICAL
Qty: 15 G | Refills: 0 | Status: SHIPPED | OUTPATIENT
Start: 2018-05-03 | End: 2019-01-07 | Stop reason: ALTCHOICE

## 2018-05-03 RX ORDER — KETOROLAC TROMETHAMINE 30 MG/ML
15 INJECTION, SOLUTION INTRAMUSCULAR; INTRAVENOUS ONCE
Status: COMPLETED | OUTPATIENT
Start: 2018-05-03 | End: 2018-05-03

## 2018-05-03 RX ADMIN — KETOROLAC TROMETHAMINE 15 MG: 30 INJECTION, SOLUTION INTRAMUSCULAR at 09:45

## 2018-05-03 NOTE — DISCHARGE INSTRUCTIONS
Muscle Strain   WHAT YOU NEED TO KNOW:   A muscle strain is a twist, pull, or tear of a muscle or tendon  A tendon is a strong elastic tissue that connects a muscle to a bone  Signs of a strained muscle include bruising and swelling over the area, pain with movement, and loss of strength  DISCHARGE INSTRUCTIONS:   Seek care immediately or call 911 if:   · You suddenly cannot feel or move your injured muscle  Contact your healthcare provider if:   · Your pain and swelling worsen or do not go away  · You have questions or concerns about your condition or care  Medicines:   · NSAIDs , such as ibuprofen, help decrease swelling, pain, and fever  This medicine is available with or without a doctor's order  NSAIDs can cause stomach bleeding or kidney problems in certain people  If you take blood thinner medicine, always ask your healthcare provider if NSAIDs are safe for you  Always read the medicine label and follow directions  · Muscle relaxers  help decrease pain and muscle spasms  · Take your medicine as directed  Contact your healthcare provider if you think your medicine is not helping or if you have side effects  Tell him or her if you are allergic to any medicine  Keep a list of the medicines, vitamins, and herbs you take  Include the amounts, and when and why you take them  Bring the list or the pill bottles to follow-up visits  Carry your medicine list with you in case of an emergency  Follow up with your healthcare provider as directed: Your healthcare provider may suggest that you have a follow-up visit before you go back to your usual activity  Write down your questions so you remember to ask them during your visits  Self-care:   · 3 to 7 days after the injury:  Use Rest, Ice, Compression, and Elevation (RICE) to help stop bruising and decrease pain and swelling  ¨ Rest:  Rest your muscle to allow your injury to heal  When the pain decreases, begin normal, slow movements   For mild and moderate muscle strains, you should rest your muscles for about 2 days  However, if you have a severe muscle strain, you should rest for 10 to 14 days  You may need to use crutches to walk if your muscle strain is in your legs or lower body  ¨ Ice:  Put an ice pack on the injured area  Put a towel between the ice pack and your skin  Do not put the ice pack directly on your skin  You can use a package of frozen peas instead of an ice pack  ¨ Compression:  You may need to wrap an elastic bandage around the area to decrease swelling  It should be tight enough for you to feel support  Do not wrap it too tightly  ¨ Elevation:  Keep the injured muscle raised above your heart if possible  For example, if you have a strain of your lower leg muscle, lie down and prop your leg up on pillows  This helps decrease pain and swelling  · 3 to 21 days after the injury:  Start to slowly and regularly exercise your strained muscle  This will help it heal  If you feel pain, decrease how hard you are exercising  · 1 to 6 weeks after the injury:  Stretch the injured muscle  Hold the stretch for about 30 seconds  Do this 4 times a day  You may stretch the muscle until you feel a slight pull  Stop stretching if you feel pain  · 2 weeks to 6 months after the injury:  The goal of this phase is to return to the activity you were doing before the injury happened, without hurting the muscle again  · 3 weeks to 6 months after the injury:  Keep stretching and strengthening your muscles to avoid injury  Slowly increase the time and distance that you exercise  You may still have signs and symptoms of muscle strain 6 months after the injury, even if you do things to help it heal  In this case, you may need surgery on the muscle  Prevent muscle strains:   · Always wear proper shoes when you play sports:  Replace your old running shoes with new ones often if you are a runner   Use special shoe inserts or arch supports to correct leg or foot problems  Ask your healthcare provider for more information on shoe supports  · Do warm up and cool down exercises:  Do stretching exercises before you work out or do sports activities  These exercises will help loosen and decrease stress on your muscles  Cool down and stretch after your workout  Do not stop and rest after a workout without cooling down first            · Keep your muscles strong with strength training exercises:  Exercises such as weight lifting and stretching exercises help keep your muscles flexible and strong  A physical therapist or  may help you with these exercises  · Slowly start your exercise or sports training program:  Follow your healthcare provider's advice on when to start exercising  Slowly increase time, distance, and how often you train  Sudden increases in how often you train may cause you to injure your muscle again  © 2017 [employer] Information is for End User's use only and may not be sold, redistributed or otherwise used for commercial purposes  All illustrations and images included in CareNotes® are the copyrighted property of A D A M , [employer]  or [employer] L[employer]  The above information is an  only  It is not intended as medical advice for individual conditions or treatments  Talk to your doctor, nurse or pharmacist before following any medical regimen to see if it is safe and effective for you  Tinea Corporis   WHAT YOU NEED TO KNOW:   Tinea corporis, or ringworm, is a skin infection caused by a fungus  It usually affects the skin on your face, chest, or limbs  Tinea corporis is most common in children and athletes  DISCHARGE INSTRUCTIONS:   Contact your healthcare provider if:   · You have a fever  · Your rash continues to spread after 7 days of treatment  · Your rash is not gone in 2 weeks  · The area around your sore becomes red, warm, tender, swollen, or smells bad      · You have questions or concerns about your condition or care  Medicines:   · Antifungal medicine  may be given as a cream or pill  Take the medicine until it is gone, even if it looks like your infection is gone sooner  · Take your medicine as directed  Contact your healthcare provider if you think your medicine is not helping or if you have side effects  Tell him of her if you are allergic to any medicine  Keep a list of the medicines, vitamins, and herbs you take  Include the amounts, and when and why you take them  Bring the list or the pill bottles to follow-up visits  Carry your medicine list with you in case of an emergency  Follow up with your healthcare provider as directed:  Write down your questions so you remember to ask them during your visits  Prevent the spread of tinea corporis:   · Wash all items that come into contact with infected skin  Wash all towels, clothes, and bedding in hot water  Use laundry soap  Clean shower stalls, mats, and floors with a germ-killing or fungus-killing   · Do not share personal items  Do not share towels, brushes, boyer, or hair accessories  · Keep your skin, hair, and nails clean and dry  Bathe every day, and dry your skin before you put medicine on the infected area  Wash your hands often  Do not scratch your sores  This may cause the infection to spread  · Do not participate in contact sports , such as wrestling, for 72 hours after starting treatment  Talk to your healthcare provider before you participate in contact sports  · Have infected pets treated by a   A patch of missing fur is a sign of infection in a pet  Wear gloves and long sleeves if you handle an infected animal  Always wash your hands after handling the animal  Vacuum your home to remove infected fur or skin flakes  Disinfect surfaces and bedding that your animal comes into contact with    © 2017 [employer] Information is for End User's use only and may not be sold, redistributed or otherwise used for commercial purposes  All illustrations and images included in CareNotes® are the copyrighted property of A [employer] M [employer] Inc  or Am[employer] Lazo  The above information is an  only  It is not intended as medical advice for individual conditions or treatments  Talk to your doctor, nurse or pharmacist before following any medical regimen to see if it is safe and effective for you

## 2018-05-03 NOTE — ED PROVIDER NOTES
History  Chief Complaint   Patient presents with    Rash     pt has a circular rash on left forearm and left chest  per pt, her  currently has ringworm  unable to be seen by PCP until 5/17/18   Shoulder Pain     pt was lifting a couch one week ago and states her left shoulder continues to hurt  unable to lift her left arm      66-year-old female presents to the emergency department with complaints of a rash  States she noticed an itchy rash on the left forearm yesterday  States that now she also has a similar area on the left upper chest wall  She denies fevers  No joint pain  States that she has not tried anything topically  Does not recall touching anything of similar shape  No others with similar rashes  Patient also complaining of some left-sided shoulder pain  States she has had ongoing pain in the shoulder over the past week since lifting a couch  States that she but the couch slipping and tried to Requip quickly and had immediate pain in the shoulder  States that pain is sometimes worse with range of motion  No previous injuries to this shoulder          History provided by:  Patient   used: No    Rash   Location:  Shoulder/arm  Shoulder/arm rash location:  L forearm  Quality: itchiness and redness    Severity:  Mild  Onset quality:  Gradual  Duration:  1 day  Timing:  Constant  Progression:  Spreading  Chronicity:  New  Context: not animal contact, not chemical exposure, not diapers, not eggs, not exposure to similar rash, not hot tub use, not insect bite/sting, not medications, not new detergent/soap, not nuts, not plant contact, not pollen, not pregnancy, not sick contacts and not sun exposure    Relieved by:  Nothing  Ineffective treatments:  None tried  Associated symptoms: no abdominal pain, no diarrhea, no fatigue, no fever, no headaches, no hoarse voice, no induration, no joint pain, no myalgias, no nausea, no periorbital edema, no shortness of breath, no sore throat, no throat swelling, no tongue swelling, no URI, not vomiting and not wheezing    Shoulder Pain   Associated symptoms: no back pain, no fatigue, no fever and no neck pain        Prior to Admission Medications   Prescriptions Last Dose Informant Patient Reported? Taking? amLODIPine (NORVASC) 10 mg tablet   Yes Yes   Sig: Take 10 mg by mouth daily  famotidine (PEPCID) 20 mg tablet   Yes Yes   Sig: Take 20 mg by mouth daily   hydrochlorothiazide (HYDRODIURIL) 12 5 mg tablet   No Yes   Sig: Take 1 tablet by mouth daily for 30 days   lisinopril (ZESTRIL) 10 mg tablet   Yes Yes   Sig: Take 10 mg by mouth daily  meloxicam (MOBIC) 15 mg tablet   Yes Yes   Sig: Take 7 5 mg by mouth daily   nicotine (NICODERM CQ) 21 mg/24 hr TD 24 hr patch   No Yes   Sig: Place 1 patch on the skin daily for 30 days   rivaroxaban (XARELTO) 20 mg tablet   Yes Yes   Sig: Take 20 mg by mouth daily with breakfast        Facility-Administered Medications: None       Past Medical History:   Diagnosis Date    Atrial fibrillation (HCC)     Atrial fibrillation (HCC)     GERD (gastroesophageal reflux disease)     Hepatitis C     Hyperlipidemia     Hypertension     Pacemaker        Past Surgical History:   Procedure Laterality Date    CARDIAC PACEMAKER PLACEMENT      CHOLECYSTECTOMY      ELBOW SURGERY      HYSTERECTOMY      HYSTERECTOMY      KNEE SURGERY Left     DE ESOPHAGOGASTRODUODENOSCOPY TRANSORAL DIAGNOSTIC N/A 5/2/2018    Procedure: ESOPHAGOGASTRODUODENOSCOPY (EGD); Surgeon: Von Tapia MD;  Location: [facility]; Service: Gastroenterology    REPLACEMENT TOTAL KNEE Left        History reviewed  No pertinent family history  I have reviewed and agree with the history as documented      Social History   Substance Use Topics    Smoking status: Current Every Day Smoker     Packs/day: 0 50     Years: 0 00     Types: Cigarettes    Smokeless tobacco: Current User    Alcohol use No        Review of Systems Constitutional: Negative for chills, fatigue and fever  HENT: Negative for congestion, dental problem, hoarse voice and sore throat  Respiratory: Negative for cough, shortness of breath and wheezing  Cardiovascular: Negative for chest pain  Gastrointestinal: Negative for abdominal pain, diarrhea, nausea and vomiting  Musculoskeletal: Negative for arthralgias, back pain, myalgias and neck pain  Shoulder pain   Skin: Positive for rash  Negative for wound  Neurological: Negative for headaches  All other systems reviewed and are negative  Physical Exam  ED Triage Vitals   Temperature Pulse Respirations Blood Pressure SpO2   05/03/18 0910 05/03/18 0910 05/03/18 0910 05/03/18 0911 05/03/18 0910   97 9 °F (36 6 °C) 65 18 156/83 95 %      Temp Source Heart Rate Source Patient Position - Orthostatic VS BP Location FiO2 (%)   05/03/18 0910 05/03/18 0910 -- -- --   Oral Monitor         Pain Score       --                  Orthostatic Vital Signs  Vitals:    05/03/18 0910 05/03/18 0911   BP:  156/83   Pulse: 65        Physical Exam   Constitutional: She is oriented to person, place, and time  Vital signs are normal  She appears well-developed and well-nourished  HENT:   Head: Normocephalic and atraumatic  Cardiovascular: Normal rate and regular rhythm  Pulmonary/Chest: Effort normal and breath sounds normal  No respiratory distress  She has no wheezes  She has no rhonchi  She has no rales  Musculoskeletal:        Left shoulder: She exhibits tenderness  She exhibits normal range of motion, no bony tenderness, no swelling, no effusion, no crepitus, no deformity, no laceration, no pain, no spasm, normal pulse and normal strength  Neurological: She is alert and oriented to person, place, and time  Skin: Skin is warm and dry  Psychiatric: She has a normal mood and affect  Her behavior is normal    Nursing note and vitals reviewed        ED Medications  Medications   ketorolac (TORADOL) injection 15 mg (15 mg Intramuscular Given 5/3/18 9106)       Diagnostic Studies  Results Reviewed     None                 XR shoulder 2+ views LEFT   ED Interpretation by Jorje Sheppard PA-C (05/03 0943)   No fracture or dislocation                 Procedures  Procedures       Phone Contacts  ED Phone Contact    ED Course                               MDM  Number of Diagnoses or Management Options  Strain of left shoulder, initial encounter:   Tinea corporis:   Diagnosis management comments: Differential diagnosis includes but not limited to: Shoulder strain, rotator cuff injury, contact dermatitis, tinea corporis  Amount and/or Complexity of Data Reviewed  Tests in the radiology section of CPT®: ordered and reviewed  Independent visualization of images, tracings, or specimens: yes      CritCare Time    Disposition  Final diagnoses:   Strain of left shoulder, initial encounter   Tinea corporis     Time reflects when diagnosis was documented in both MDM as applicable and the Disposition within this note     Time User Action Codes Description Comment    5/3/2018 10:00 AM Cloyce Colace Add [U96 833G] Strain of left shoulder, initial encounter     5/3/2018 10:00 AM Cloyce Colace Add [B35 4] Tinea corporis       ED Disposition     ED Disposition Condition Comment    Discharge  Ruchi Snyder discharge to home/self care      Condition at discharge: Stable        Follow-up Information     Follow up With Specialties Details Why Contact Info      Schedule an appointment as soon as possible for a visit      [address] [facility] Schedule an appointment as soon as possible for a visit If symptoms worsen Theresa [address] [place] [id number]  [phone]        Patient's Medications   Discharge Prescriptions    CLOTRIMAZOLE-BETAMETHASONE (LOTRISONE) 1-0 05 % CREAM    Apply to affected area 2 times daily prn       Start Date: 5/3/2018 End Date: --       Order Dose: --       Quantity: 15 g    Refills: 0     No discharge procedures on file      ED Provider  Electronically Signed by           Sanjiv Brian PA-C  05/03/18 4237

## 2018-05-15 ENCOUNTER — REMOTE DEVICE CLINIC VISIT (OUTPATIENT)
Dept: CARDIOLOGY CLINIC | Facility: CLINIC | Age: 53
End: 2018-05-15
Payer: COMMERCIAL

## 2018-05-15 DIAGNOSIS — Z95.810 PRESENCE OF AUTOMATIC CARDIOVERTER/DEFIBRILLATOR (AICD): ICD-10-CM

## 2018-05-15 DIAGNOSIS — I48.91 ATRIAL FIBRILLATION, UNSPECIFIED TYPE (HCC): ICD-10-CM

## 2018-05-15 DIAGNOSIS — I47.2 VENTRICULAR TACHYCARDIA (HCC): Primary | ICD-10-CM

## 2018-05-15 DIAGNOSIS — I48.91 ATRIAL FIBRILLATION, UNSPECIFIED TYPE (HCC): Primary | ICD-10-CM

## 2018-05-15 PROCEDURE — 93295 DEV INTERROG REMOTE 1/2/MLT: CPT | Performed by: INTERNAL MEDICINE

## 2018-05-15 PROCEDURE — 93296 REM INTERROG EVL PM/IDS: CPT | Performed by: INTERNAL MEDICINE

## 2018-05-15 RX ORDER — METOPROLOL SUCCINATE 25 MG/1
25 TABLET, EXTENDED RELEASE ORAL 2 TIMES DAILY
Qty: 180 TABLET | Refills: 3 | Status: SHIPPED | OUTPATIENT
Start: 2018-05-15 | End: 2018-12-27 | Stop reason: HOSPADM

## 2018-05-15 NOTE — PROGRESS NOTES
Results for orders placed or performed in visit on 05/15/18   Cardiac EP device report    Narrative    MDT DUAL ICD  CARELINK TRANSMISSION: BATTERY VOLTAGE ADEQUATE (9 2 YRS)  AP 36%;  <0 1%  ALL LEAD PARAMETERS WITHIN NORMAL LIMITS  1 VT-NS EPISODE WITH EGM SHOWING AF/AFL-RVR (2 SECONDS/UP  BPM)  PT  Freda Hayes  HAS BEEN OFF METOPROLOL SUCC  FOR PAST TWO WKS DUE TO NO REFILLS-WILL RESUME  PT  COMPLAINED OF PALPS AT TIME OF EPISODE  APPT  WITH DR Jurgen Alas 6/13/18  OPTI-VOL WITHIN NORMAL LIMITS  NORMAL DEVICE FUNCTION   PL/GV

## 2018-06-05 RX ORDER — OXYCODONE HYDROCHLORIDE 5 MG/1
TABLET ORAL
COMMUNITY
Start: 2017-03-25 | End: 2018-08-06

## 2018-06-05 RX ORDER — OSELTAMIVIR PHOSPHATE 75 MG/1
CAPSULE ORAL
COMMUNITY
Start: 2017-01-25 | End: 2018-08-06

## 2018-06-06 ENCOUNTER — TRANSCRIBE ORDERS (OUTPATIENT)
Dept: INTERNAL MEDICINE CLINIC | Facility: CLINIC | Age: 53
End: 2018-06-06

## 2018-06-06 DIAGNOSIS — J38.1 POLYP OF VOCAL CORD AND LARYNX: Primary | ICD-10-CM

## 2018-06-21 ENCOUNTER — APPOINTMENT (EMERGENCY)
Dept: RADIOLOGY | Facility: HOSPITAL | Age: 53
End: 2018-06-21
Payer: COMMERCIAL

## 2018-06-21 ENCOUNTER — HOSPITAL ENCOUNTER (EMERGENCY)
Facility: HOSPITAL | Age: 53
Discharge: HOME | End: 2018-06-21
Admitting: EMERGENCY MEDICINE
Payer: COMMERCIAL

## 2018-06-21 VITALS
DIASTOLIC BLOOD PRESSURE: 91 MMHG | HEART RATE: 64 BPM | WEIGHT: 251.54 LBS | BODY MASS INDEX: 38.82 KG/M2 | OXYGEN SATURATION: 98 % | TEMPERATURE: 98.4 F | SYSTOLIC BLOOD PRESSURE: 136 MMHG | RESPIRATION RATE: 18 BRPM

## 2018-06-21 DIAGNOSIS — S83.90XA KNEE SPRAIN: Primary | ICD-10-CM

## 2018-06-21 DIAGNOSIS — I10 HYPERTENSION: ICD-10-CM

## 2018-06-21 PROCEDURE — 73564 X-RAY EXAM KNEE 4 OR MORE: CPT

## 2018-06-21 PROCEDURE — 99283 EMERGENCY DEPT VISIT LOW MDM: CPT

## 2018-06-21 RX ORDER — PREDNISONE 50 MG/1
50 TABLET ORAL DAILY
Qty: 5 TABLET | Refills: 0 | Status: SHIPPED | OUTPATIENT
Start: 2018-06-21 | End: 2018-06-26

## 2018-06-21 RX ORDER — HYDROCODONE BITARTRATE AND ACETAMINOPHEN 5; 325 MG/1; MG/1
1 TABLET ORAL ONCE
Status: COMPLETED | OUTPATIENT
Start: 2018-06-21 | End: 2018-06-21

## 2018-06-21 RX ORDER — HYDROCODONE BITARTRATE AND ACETAMINOPHEN 5; 325 MG/1; MG/1
1 TABLET ORAL EVERY 6 HOURS PRN
Qty: 4 TABLET | Refills: 0 | Status: SHIPPED | OUTPATIENT
Start: 2018-06-21 | End: 2018-06-25

## 2018-06-21 RX ADMIN — PREDNISONE 50 MG: 20 TABLET ORAL at 12:06

## 2018-06-21 RX ADMIN — HYDROCODONE BITARTRATE AND ACETAMINOPHEN 1 TABLET: 5; 325 TABLET ORAL at 12:06

## 2018-06-21 NOTE — DISCHARGE INSTRUCTIONS
Chronic Hypertension, Ambulatory Care   GENERAL INFORMATION:   Chronic hypertension  is a long-term condition in which your blood pressure (BP) is higher than normal  Your BP is the force of your blood moving against the walls of your arteries  Hypertension is a BP of 140/90 or higher  Common symptoms include the following:   · Headache     · Blurred vision    · Chest pain     · Dizziness or weakness     · Trouble breathing     · Nosebleeds  Seek immediate care for the following symptoms:   · Severe headache or vision loss    · Weakness in an arm or leg    · Confusion or difficulty speaking    · Discomfort in your chest that feels like squeezing, pressure, fullness, or pain    · Suddenly feeling lightheaded or trouble breathing    · Pain or discomfort in your back, neck, jaw, stomach, or arm  Treatment for chronic hypertension  may include medicine to lower your BP  You may also need to make lifestyle changes  Take your medicine exactly as directed  Manage chronic hypertension:   · Take your BP at home  Sit and rest for 5 minutes before you take your BP  Extend your arm and support it on a flat surface  Your arm should be at the same level as your heart  Follow the directions that came with your BP monitor  If possible, take at least 2 BP readings each time  Take your BP at least twice a day at the same times each day, such as morning and evening  Keep a log of your BP readings and bring it to your follow-up visits  · Eat less sodium (salt)  Do not add sodium to your food  Limit foods that are high in sodium, such as canned foods, potato chips, and cold cuts  Your healthcare provider may suggest that you follow the [address] [place] [address] Street  The plan is low in sodium, unhealthy fats, and total fat  It is high in potassium, calcium, and fiber  · Exercise regularly  Exercise at least 30 minutes per day, on most days of the week  This will help decrease your BP   Ask your healthcare provider about the best exercise plan for you  · Limit alcohol  Women should limit alcohol to 1 drink a day  Men should limit alcohol to 2 drinks a day  A drink of alcohol is 12 ounces of beer, 5 ounces of wine, or 1½ ounces of liquor  · Do not smoke  If you smoke, it is never too late to quit  Smoking can increase your BP  Smoking also worsens other health conditions you may have that can increase your risk for hypertension  Ask your healthcare provider for information if you need help quitting  Follow up with your healthcare provider as directed: You will need to return to have your BP checked and to have other lab tests done  Write down your questions so you remember to ask them during your visits  CARE AGREEMENT:   You have the right to help plan your care  Learn about your health condition and how it may be treated  Discuss treatment options with your caregivers to decide what care you want to receive  You always have the right to refuse treatment  The above information is an  only  It is not intended as medical advice for individual conditions or treatments  Talk to your doctor, nurse or pharmacist before following any medical regimen to see if it is safe and effective for you  © 2014 [address] is for End User's use only and may not be sold, redistributed or otherwise used for commercial purposes  All illustrations and images included in CareNotes® are the copyrighted property of [employer]  or [employer] L[employer]  Knee Sprain   WHAT YOU NEED TO KNOW:   A knee sprain occurs when one or more ligaments in your knee are suddenly stretched or torn  Ligaments are tissues that hold bones together  Ligaments support the knee and keep the joint and bones in the correct position          DISCHARGE INSTRUCTIONS:   Return to the emergency department if:   · Any part of your leg feels cold, numb, or looks pale     Contact your healthcare provider if:   · You have new or increased swelling, bruising, or pain in your knee  · Your symptoms do not improve within 6 weeks, even with treatment  · You have questions or concerns about your condition or care  Medicines:   · NSAIDs , such as ibuprofen, help decrease swelling, pain, and fever  This medicine is available with or without a doctor's order  NSAIDs can cause stomach bleeding or kidney problems in certain people  If you take blood thinner medicine, always ask your healthcare provider if NSAIDs are safe for you  Always read the medicine label and follow directions  · Acetaminophen  decreases pain and fever  It is available without a doctor's order  Ask how much to take and how often to take it  Follow directions  Read the labels of all other medicines you are using to see if they also contain acetaminophen, or ask your doctor or pharmacist  Acetaminophen can cause liver damage if not taken correctly  Do not use more than 4 grams (4,000 milligrams) total of acetaminophen in one day  · Prescription pain medicine  may be given  Ask how to take this medicine safely  · Take your medicine as directed  Contact your healthcare provider if you think your medicine is not helping or if you have side effects  Tell him or her if you are allergic to any medicine  Keep a list of the medicines, vitamins, and herbs you take  Include the amounts, and when and why you take them  Bring the list or the pill bottles to follow-up visits  Carry your medicine list with you in case of an emergency  Self-care:   · Rest  your knee and do not exercise  You may be told to keep weight off your knee  This means that you should not walk on your injured leg  Rest helps decrease swelling and allows the injury to heal  You can do gentle range of motion (ROM) exercises as directed  This will prevent stiffness  · Apply ice  on your knee for 15 to 20 minutes every hour or as directed  Use an ice pack, or put crushed ice in a plastic bag   Cover it with a towel  Ice helps prevent tissue damage and decreases swelling and pain  · Apply compression to your knee as directed  You may need to wear an elastic bandage  This helps keep your injured knee from moving too much while it heals  You can loosen or tighten the elastic bandage to make it comfortable  It should be tight enough for you to feel support  It should not be so tight that it causes your toes to feel numb or tingly  If you are wearing an elastic bandage, take it off and rewrap it once a day  · Elevate your knee  above the level of your heart as often as you can  This will help decrease swelling and pain  Prop your leg on pillows or blankets to keep it elevated comfortably  Do not put pillows directly behind your knee  · Use support devices as directed:  Support devices such as a splint or brace may be needed  These devices limit movement and protect your joint while it heals  You may be given crutches to use until you can stand on your injured leg without pain  Use devices as directed  Physical therapy:  A physical therapist teaches you exercises to help improve movement and strength, and to decrease pain  Prevent another knee sprain:  Exercise your legs to keep your muscles strong  Strong leg muscles help protect your knee and prevent strain  The following may also prevent a knee sprain:  · Slowly start your exercise or training program   Slowly increase the time, distance, and intensity of your exercise  Sudden increases in training may cause you to injure your knee again  · Wear protective braces and equipment as directed  Braces may prevent your knee from moving the wrong way and causing another sprain  Protective equipment may support your bones and ligaments to prevent injury  · Warm up and stretch before exercise  Warm up by walking or using an exercise bike before starting your regular exercise  Do gentle stretches after warming up   This helps to loosen your muscles and decrease stress on your knee  Cool down and stretch after you exercise  · Wear shoes that fit correctly and support your feet  Replace your running or exercise shoes before the padding or shock absorption is worn out  Ask your healthcare provider which exercise shoes are best for you  Ask if you should wear special shoe inserts  Shoe inserts can help support your heels and arches or keep your foot lined up correctly in your shoes  Exercise on flat surfaces  Follow up with your healthcare provider as directed:  Write down your questions so you remember to ask them during your visits  © 2017 [address] [facility] Information is for End User's use only and may not be sold, redistributed or otherwise used for commercial purposes  All illustrations and images included in CareNotes® are the copyrighted property of [employer]  or [employer]  The above information is an  only  It is not intended as medical advice for individual conditions or treatments  Talk to your doctor, nurse or pharmacist before following any medical regimen to see if it is safe and effective for you

## 2018-06-21 NOTE — ED PROVIDER NOTES
History  Chief Complaint   Patient presents with    Knee Injury     Pt presents to ED from home w/ left knee pain and swelling starting two days ago after tripping over curb  Pt (+) hx right knee surgery two years ago  Pt (+) hx HTN  History provided by:  Patient  Knee Pain   Location:  Knee  Time since incident:  2 days  Injury: yes    Mechanism of injury: fall    Fall:     Fall occurred:  Standing    Impact surface:  [place]  Knee location:  L knee  Pain details:     Quality:  Aching    Radiates to:  Does not radiate    Severity:  Moderate    Onset quality:  Sudden    Duration:  2 days    Timing:  Constant    Progression:  Waxing and waning  Chronicity:  New  Dislocation: no    Prior injury to area:  No  Relieved by:  Nothing  Worsened by:  Bearing weight and flexion  Ineffective treatments: Motrin  Associated symptoms: decreased ROM and stiffness    Associated symptoms: no back pain, no fatigue, no fever, no itching, no muscle weakness, no neck pain, no numbness, no swelling and no tingling    Risk factors: no concern for non-accidental trauma, no frequent fractures, no known bone disorder, no obesity and no recent illness        Prior to Admission Medications   Prescriptions Last Dose Informant Patient Reported? Taking? amLODIPine (NORVASC) 10 mg tablet   Yes No   Sig: Take 10 mg by mouth daily  clotrimazole-betamethasone (LOTRISONE) 1-0 05 % cream   No No   Sig: Apply to affected area 2 times daily prn   famotidine (PEPCID) 20 mg tablet   Yes No   Sig: Take 20 mg by mouth daily   hydrochlorothiazide (HYDRODIURIL) 12 5 mg tablet   No No   Sig: Take 1 tablet by mouth daily for 30 days   lisinopril (ZESTRIL) 10 mg tablet   Yes No   Sig: Take 10 mg by mouth daily     meloxicam (MOBIC) 15 mg tablet   Yes No   Sig: Take 7 5 mg by mouth daily   metoprolol succinate (TOPROL-XL) 25 mg 24 hr tablet   No No   Sig: Take 1 tablet (25 mg total) by mouth 2 (two) times a day   nicotine (NICODERM CQ) 21 mg/24 hr TD 24 hr patch   No No   Sig: Place 1 patch on the skin daily for 30 days   oseltamivir (TAMIFLU) 75 mg capsule   Yes No   Sig: Take by mouth   oxyCODONE (ROXICODONE) 5 mg immediate release tablet   Yes No   Sig: Take by mouth   rivaroxaban (XARELTO) 20 mg tablet   Yes No   Sig: Take 20 mg by mouth daily with breakfast        Facility-Administered Medications: None       Past Medical History:   Diagnosis Date    Atrial fibrillation (HCC)     Atrial fibrillation (HCC)     Breast lump     GERD (gastroesophageal reflux disease)     Hepatitis C     Hyperlipidemia     Hypertension     Pacemaker        Past Surgical History:   Procedure Laterality Date    CARDIAC PACEMAKER PLACEMENT      CHOLECYSTECTOMY      COLONOSCOPY      ELBOW SURGERY      HYSTERECTOMY      HYSTERECTOMY      KNEE SURGERY Left     LA ESOPHAGOGASTRODUODENOSCOPY TRANSORAL DIAGNOSTIC N/A 5/2/2018    Procedure: ESOPHAGOGASTRODUODENOSCOPY (EGD); Surgeon: Arlyn Bustillos MD;  Location: [facility]; Service: Gastroenterology    REPLACEMENT TOTAL KNEE Left        Family History   Problem Relation Age of Onset    Arthritis Family     Cancer Family     Diabetes Family     Hypertension Family      I have reviewed and agree with the history as documented  Social History   Substance Use Topics    Smoking status: Current Every Day Smoker     Packs/day: 1 00     Years: 0 00     Types: Cigarettes    Smokeless tobacco: Never Used    Alcohol use No        Review of Systems   Constitutional: Negative for fatigue and fever  Musculoskeletal: Positive for stiffness  Negative for back pain and neck pain  Skin: Negative for itching         Physical Exam  Physical Exam    Vital Signs  ED Triage Vitals [06/21/18 1146]   Temperature Pulse Respirations Blood Pressure SpO2   98 4 °F (36 9 °C) 67 18 (!) 205/101 98 %      Temp Source Heart Rate Source Patient Position - Orthostatic VS BP Location FiO2 (%)   Oral Monitor Lying Right arm --      Pain Score       Worst Possible Pain           Vitals:    06/21/18 1146 06/21/18 1229   BP: (!) 205/101 136/91   Pulse: 67 64   Patient Position - Orthostatic VS: Lying        Visual Acuity      ED Medications  Medications   predniSONE tablet 50 mg (50 mg Oral Given 6/21/18 1206)   HYDROcodone-acetaminophen (NORCO) 5-325 mg per tablet 1 tablet (1 tablet Oral Given 6/21/18 1206)       Diagnostic Studies  Results Reviewed     None                 XR knee 4+ vw left injury   ED Interpretation by Suzanne Rinne, PA-C (06/21 1217)   Total knee replacement, no fracture      Final Result by Ella Joel MD (06/21 1323)      No acute osseous abnormality  Workstation performed: [id number]                    Procedures  Procedures       Phone Contacts  ED Phone Contact    ED Course  ED Course as of Jun 21 1738   Thu Jun 21, 2018   1231 Repeat /91                                MDM  Number of Diagnoses or Management Options  Hypertension: new and does not require workup  Knee sprain: new and requires workup  Risk of Complications, Morbidity, and/or Mortality  Presenting problems: moderate  Diagnostic procedures: moderate  Management options: moderate  General comments: Patient presents emergency room complaining of pain in her left knee after tripping and falling  She is status post a left total knee replacement  She was seen and evaluated  She did was quite hypertensive when she 1st came in but a repeat blood pressure demonstrated her pressure to be 136/91  Her x-rays show a stable total knee replacement with no acute osseous abnormalities  She was given a prescription for pain medications and instructed to follow up with her orthopedic physician      Patient Progress  Patient progress: stable    CritCare Time    Disposition  Final diagnoses:   Knee sprain - Acute left knee sprain/total knee replacement   Hypertension     Time reflects when diagnosis was documented in both MDM as applicable and the Disposition within this note     Time User Action Codes Description Comment    6/21/2018 12:22 PM Jose Mahsa Knee sprain     6/21/2018 12:22 PM Cristi Wallis Modify [S83 90XA] Knee sprain Acute left knee sprain/total knee replacement    6/21/2018 12:23 PM Cristi Wallis Add [I10] Hypertension       ED Disposition     ED Disposition Condition Comment    Discharge  Wai Arrieta discharge to home/self care  Condition at discharge: Good        Follow-up Information     Follow up With Specialties Details Why Contact Info    Your orthopedic surgeon   As scheduled next week     Noah Jimenez MD Internal Medicine In 1 day Repeat blood pressure check [id number] [phone]            Discharge Medication List as of 6/21/2018 12:26 PM      START taking these medications    Details   HYDROcodone-acetaminophen (NORCO) 5-325 mg per tablet Take 1 tablet by mouth every 6 (six) hours as needed for pain for up to 4 days Max Daily Amount: 4 tablets, Starting Thu 6/21/2018, Until Mon 6/25/2018, Print      predniSONE 50 mg tablet Take 1 tablet (50 mg total) by mouth daily for 5 days, Starting Thu 6/21/2018, Until Tue 6/26/2018, Print         CONTINUE these medications which have NOT CHANGED    Details   amLODIPine (NORVASC) 10 mg tablet Take 10 mg by mouth daily  , Until Discontinued, Historical Med      clotrimazole-betamethasone (LOTRISONE) 1-0 05 % cream Apply to affected area 2 times daily prn, Normal      famotidine (PEPCID) 20 mg tablet Take 20 mg by mouth daily, Until Discontinued, Historical Med      hydrochlorothiazide (HYDRODIURIL) 12 5 mg tablet Take 1 tablet by mouth daily for 30 days, Starting Thu 5/11/2017, Until Thu 5/3/2018, Print      lisinopril (ZESTRIL) 10 mg tablet Take 10 mg by mouth daily  , Until Discontinued, Historical Med      meloxicam (MOBIC) 15 mg tablet Take 7 5 mg by mouth daily, Until Discontinued, Historical Med      metoprolol succinate (TOPROL-XL) 25 mg 24 hr tablet Take 1 tablet (25 mg total) by mouth 2 (two) times a day, Starting Tue 5/15/2018, Normal      nicotine (NICODERM CQ) 21 mg/24 hr TD 24 hr patch Place 1 patch on the skin daily for 30 days, Starting Thu 5/11/2017, Until Thu 5/3/2018, Print      oseltamivir (TAMIFLU) 75 mg capsule Take by mouth, Starting Wed 1/25/2017, Historical Med      oxyCODONE (ROXICODONE) 5 mg immediate release tablet Take by mouth, Starting Sat 3/25/2017, Historical Med      rivaroxaban (XARELTO) 20 mg tablet Take 20 mg by mouth daily with breakfast  , Until Discontinued, Historical Med           No discharge procedures on file      ED Provider  Electronically Signed by           Mario Cordon PA-C  06/21/18 1290

## 2018-06-22 ENCOUNTER — OFFICE VISIT (OUTPATIENT)
Dept: MULTI SPECIALTY CLINIC | Facility: CLINIC | Age: 53
End: 2018-06-22
Payer: COMMERCIAL

## 2018-06-22 VITALS
HEIGHT: 67 IN | DIASTOLIC BLOOD PRESSURE: 84 MMHG | WEIGHT: 246.03 LBS | SYSTOLIC BLOOD PRESSURE: 120 MMHG | BODY MASS INDEX: 38.62 KG/M2

## 2018-06-22 DIAGNOSIS — K22.70 BARRETT'S ESOPHAGUS WITHOUT DYSPLASIA: ICD-10-CM

## 2018-06-22 DIAGNOSIS — K21.9 REFLUX LARYNGITIS: ICD-10-CM

## 2018-06-22 DIAGNOSIS — J38.4 LARYNGEAL EDEMA: ICD-10-CM

## 2018-06-22 DIAGNOSIS — R49.0 MUSCLE TENSION DYSPHONIA: ICD-10-CM

## 2018-06-22 DIAGNOSIS — R49.0 DYSPHONIA: Primary | ICD-10-CM

## 2018-06-22 DIAGNOSIS — J04.0 REFLUX LARYNGITIS: ICD-10-CM

## 2018-06-22 DIAGNOSIS — J38.1 POLYPOID CORDITIS: ICD-10-CM

## 2018-06-22 DIAGNOSIS — K21.00 GASTROESOPHAGEAL REFLUX DISEASE WITH ESOPHAGITIS: ICD-10-CM

## 2018-06-22 DIAGNOSIS — H91.91 HEARING LOSS OF RIGHT EAR, UNSPECIFIED HEARING LOSS TYPE: ICD-10-CM

## 2018-06-22 DIAGNOSIS — J38.3 GLOTTIC INSUFFICIENCY: ICD-10-CM

## 2018-06-22 DIAGNOSIS — J30.9 ALLERGIC RHINITIS, UNSPECIFIED SEASONALITY, UNSPECIFIED TRIGGER: ICD-10-CM

## 2018-06-22 DIAGNOSIS — F17.200 TOBACCO DEPENDENCE: ICD-10-CM

## 2018-06-22 PROCEDURE — 99203 OFFICE O/P NEW LOW 30 MIN: CPT | Performed by: OTOLARYNGOLOGY

## 2018-06-22 PROCEDURE — 31575 DIAGNOSTIC LARYNGOSCOPY: CPT | Performed by: OTOLARYNGOLOGY

## 2018-06-22 RX ORDER — PANTOPRAZOLE SODIUM 40 MG/1
40 TABLET, DELAYED RELEASE ORAL DAILY
Qty: 30 TABLET | Refills: 6 | Status: SHIPPED | OUTPATIENT
Start: 2018-06-22 | End: 2018-12-26

## 2018-06-22 NOTE — PROGRESS NOTES
[facility] New Patient Visit    Josehp Morocho is a 48 y o  who presents with a chief complaint of hoarseness, reflux    HPI:  Hx of emesis, dysphagia, sensation of food sticking in throat  Saw GI and had EGD showing vf polyp, concern for Barretts  Started pantoprazole 5/2/18, she thinks 20mg qam   Helped with emesis, less food sticking  Reflux-emesis, dysphagia, heartburn/regurgitation  Mucous/throat clearing, cough, worse in AM   Sore throat, hurts to talk  Hoarseness for many years, getting worse  Sometimes nothing comes out when talking  No change on pantoprazole  Dysphagia  No PND  Some globus  Triggers incl cheese, sauce  Denied gluten sensitivity     Allergy- sneezes a lot, some eye burning  Takes OTC antihistamine and nasal spray (flonase)  No wheeze/asthma  SURINDER CPAP    Smokes 1ppd, was 2ppd  Started 5 yrs ago    Hx afib with pacemaker/defib    Thyroid u/s and FNAB recently completed and benign reportedly ([facility]), to repeat in 1 yr    EGD 5/2/18   A  Duodenum (biopsy):     - Mild chronic nonspecific duodenitis  - No marked increase in intraepithelial lymphocytes or villous atrophy       - No granulomas or dysplasia identified      Comment:  Correlation with celiac serologic studies may be considered as clinically indicated      B  Esophagogastric junction (biopsy):     - Cardiac gastric and squamous junctional mucosa with intestinal metaplasia (goblet cells present)  - Squamous eosinophils number less than 2 per HPF      - Detached fragments of duodenal-type mucosa noted  - No dysplasia or neoplasia identified  Results reviewed; images from any scan have been personally reviewed:      Review of systems 10 point review of systems reviewed as documented in the intake form, scanned into the medical record under the media tab  The past medical, surgical, social and family history have been reviewed as documented in today's record      No problem-specific Assessment & Plan notes found for this encounter  Physical exam:     /84 (BP Location: Right arm, Patient Position: Sitting, Cuff Size: Standard)   Ht 5' 6 5" (1 689 m)   Wt 112 kg (246 lb 0 5 oz)   BMI 39 12 kg/m²     Constitutional:  Well developed, well nourished, in no acute distress  Voice: deep, raspy, gravel    Eyes:  Extra-ocular movements intact, The lids and conjunctivae are normal in appearance  Head: Atraumatic, normocephalic, no visible scalp lesions  TMJ; very tender on the left    Ears:  Auricles normal in appearance bilaterally, mastoid prominence non-tender, external auditory canals clear right, mild cerumen left, tympanic membranes intact bilaterally without evidence of middle ear effusion or masses  Nose/Sinuses:  External appearance unremarkable, no maxillary or frontal sinus tenderness to palpation bilaterally  Anterior rhinoscopy revealed congested, dry, red nasal cavities, mild deviated nasal septum, inferior turbinates with hypertrophy  Oral Cavity:  Moist mucus membranes, gums unremarkable, no oral mucosal masses or lesions, floor of mouth soft, tongue mobile without masses or lesions  Oropharynx:  Base of tongue soft and without masses, tonsils bilaterally unremarkable, soft palate mucosa unremarkable, thick white mucous descending from nasopharynx bilaterally; red/cobblestoning posterior OP wall;  laryngeal mirror inadequate for evaluation  Neck:  No visible or palpable cervical lesions or lymphadenopathy, thyroid gland is normal in size and symmetry with nodules, normal laryngeal elevation with swallowing  Salivary Glands: Parotid and submandibular salivary glands non-tender to palpation and without masses bilaterally  Cardiovascular:  Extremities perfused, pulses palpable  Respiratory:  Normal respiratory effort without evidence of retractions or use of accessory muscles; no stridor      Neurologic:  Alert; Cranial nerves II-VII, IX-XII intact bilaterally  Dec finger rub on right    Psychiatric:  Alert and oriented to time, place and person, normal affect  Procedures  PROCEDURE: Flexible Laryngoscopy  Indication:  Dysphonia, vf polyp  Verbal consent obtained  Surgeon: Kimmy Mccabe MD  Anesthesia: 2% lidocaine, oxymetazoline  Scope passed through nasal cavity  Nasopharynx: unremarkable  Oropharynx: unremarkable  Hypopharynx/Larynx:   Vocal fold mobility = intact   Laryngeal edema  = mod   Laryngeal erythema = mod   Vocal folds = polypoid corditis   Other findings = laryngitis, reflux laryngitis, hyperkeratosis  Scope was removed  Patient tolerated procedure well without complications      Assessment:   1  Dysphonia     2  Polypoid corditis  Ambulatory Referral to Otolaryngology   3  Reflux laryngitis  pantoprazole (PROTONIX) 40 mg tablet   4  Tobacco dependence     5  Laryngeal edema     6  Glottic insufficiency     7  Muscle tension dysphonia     8  Allergic rhinitis, unspecified seasonality, unspecified trigger     9  Hearing loss of right ear, unspecified hearing loss type     10  Gastroesophageal reflux disease with esophagitis  pantoprazole (PROTONIX) 40 mg tablet   11  Thayer's esophagus without dysplasia         Orders  No orders of the defined types were placed in this encounter  Plan:  -smoking cessation  -allergy (flonase, saline, zyrtec)  -reflux- pantoprazole 40 qam, famotidine 40 qhs, diet/lifestyle changes  -audiogram   -speech therapy  -labs to be considered (gluten panel, H  Pylori, Free T4, TSH, MANUEL, RF, Lyme)  -return to [facility] in 6 weeks for recheck        Thank you for allowing me to participate in the care of your patient

## 2018-08-02 ENCOUNTER — APPOINTMENT (OUTPATIENT)
Dept: LAB | Facility: CLINIC | Age: 53
End: 2018-08-02
Payer: COMMERCIAL

## 2018-08-02 ENCOUNTER — TRANSCRIBE ORDERS (OUTPATIENT)
Dept: LAB | Facility: CLINIC | Age: 53
End: 2018-08-02

## 2018-08-02 ENCOUNTER — LAB (OUTPATIENT)
Dept: LAB | Facility: CLINIC | Age: 53
End: 2018-08-02
Payer: COMMERCIAL

## 2018-08-02 DIAGNOSIS — J04.0 LARYNGITIS: ICD-10-CM

## 2018-08-02 DIAGNOSIS — R49.0 DYSPHONIA: ICD-10-CM

## 2018-08-02 DIAGNOSIS — J38.00 VOCAL FOLD PARESIS: ICD-10-CM

## 2018-08-02 DIAGNOSIS — R49.0 DYSPHONIA: Primary | ICD-10-CM

## 2018-08-02 DIAGNOSIS — K21.9 GASTROESOPHAGEAL REFLUX DISEASE, ESOPHAGITIS PRESENCE NOT SPECIFIED: ICD-10-CM

## 2018-08-02 LAB
ANION GAP SERPL CALCULATED.3IONS-SCNC: 8 MMOL/L (ref 4–13)
ATRIAL RATE: 76 BPM
BUN SERPL-MCNC: 11 MG/DL (ref 5–25)
CALCIUM SERPL-MCNC: 8.9 MG/DL (ref 8.3–10.1)
CHLORIDE SERPL-SCNC: 104 MMOL/L (ref 100–108)
CO2 SERPL-SCNC: 27 MMOL/L (ref 21–32)
CREAT SERPL-MCNC: 1.34 MG/DL (ref 0.6–1.3)
ERYTHROCYTE [DISTWIDTH] IN BLOOD BY AUTOMATED COUNT: 13.3 % (ref 11.6–15.1)
GFR SERPL CREATININE-BSD FRML MDRD: 45 ML/MIN/1.73SQ M
GLUCOSE SERPL-MCNC: 130 MG/DL (ref 65–140)
HCT VFR BLD AUTO: 40.6 % (ref 34.8–46.1)
HGB BLD-MCNC: 12.8 G/DL (ref 11.5–15.4)
MCH RBC QN AUTO: 26.9 PG (ref 26.8–34.3)
MCHC RBC AUTO-ENTMCNC: 31.5 G/DL (ref 31.4–37.4)
MCV RBC AUTO: 86 FL (ref 82–98)
P AXIS: 70 DEGREES
PLATELET # BLD AUTO: 188 THOUSANDS/UL (ref 149–390)
PMV BLD AUTO: 11.8 FL (ref 8.9–12.7)
POTASSIUM SERPL-SCNC: 3.6 MMOL/L (ref 3.5–5.3)
PR INTERVAL: 168 MS
QRS AXIS: -39 DEGREES
QRSD INTERVAL: 146 MS
QT INTERVAL: 446 MS
QTC INTERVAL: 501 MS
RBC # BLD AUTO: 4.75 MILLION/UL (ref 3.81–5.12)
SODIUM SERPL-SCNC: 139 MMOL/L (ref 136–145)
T WAVE AXIS: 120 DEGREES
T4 FREE SERPL-MCNC: 1.36 NG/DL (ref 0.76–1.46)
TSH SERPL DL<=0.05 MIU/L-ACNC: 0.52 UIU/ML (ref 0.36–3.74)
VENTRICULAR RATE: 76 BPM
WBC # BLD AUTO: 8.04 THOUSAND/UL (ref 4.31–10.16)

## 2018-08-02 PROCEDURE — 85027 COMPLETE CBC AUTOMATED: CPT

## 2018-08-02 PROCEDURE — 86431 RHEUMATOID FACTOR QUANT: CPT

## 2018-08-02 PROCEDURE — 84443 ASSAY THYROID STIM HORMONE: CPT

## 2018-08-02 PROCEDURE — 86255 FLUORESCENT ANTIBODY SCREEN: CPT

## 2018-08-02 PROCEDURE — 86430 RHEUMATOID FACTOR TEST QUAL: CPT

## 2018-08-02 PROCEDURE — 86677 HELICOBACTER PYLORI ANTIBODY: CPT

## 2018-08-02 PROCEDURE — 83516 IMMUNOASSAY NONANTIBODY: CPT

## 2018-08-02 PROCEDURE — 86038 ANTINUCLEAR ANTIBODIES: CPT

## 2018-08-02 PROCEDURE — 93010 ELECTROCARDIOGRAM REPORT: CPT | Performed by: INTERNAL MEDICINE

## 2018-08-02 PROCEDURE — 36415 COLL VENOUS BLD VENIPUNCTURE: CPT

## 2018-08-02 PROCEDURE — 82784 ASSAY IGA/IGD/IGG/IGM EACH: CPT

## 2018-08-02 PROCEDURE — 84439 ASSAY OF FREE THYROXINE: CPT

## 2018-08-02 PROCEDURE — 80048 BASIC METABOLIC PNL TOTAL CA: CPT

## 2018-08-02 PROCEDURE — 86618 LYME DISEASE ANTIBODY: CPT

## 2018-08-02 PROCEDURE — 93005 ELECTROCARDIOGRAM TRACING: CPT

## 2018-08-03 LAB
B BURGDOR IGG SER IA-ACNC: 0.07
B BURGDOR IGM SER IA-ACNC: 0.14
CRYOGLOB RF SER-ACNC: ABNORMAL [IU]/ML
ENDOMYSIUM IGA SER QL: NEGATIVE
GLIADIN PEPTIDE IGA SER-ACNC: 3 UNITS (ref 0–19)
GLIADIN PEPTIDE IGG SER-ACNC: 5 UNITS (ref 0–19)
H PYLORI IGG SER IA-ACNC: 0.47 INDEX VALUE (ref 0–0.79)
H PYLORI IGM SER-ACNC: <9 UNITS (ref 0–8.9)
IGA SERPL-MCNC: 134 MG/DL (ref 87–352)
RHEUMATOID FACT SER QL LA: POSITIVE
RYE IGE QN: NEGATIVE
TTG IGA SER-ACNC: <2 U/ML (ref 0–3)
TTG IGG SER-ACNC: 2 U/ML (ref 0–5)

## 2018-08-06 NOTE — PRE-PROCEDURE INSTRUCTIONS
Pre-Surgery Instructions:   Medication Instructions    amLODIPine (NORVASC) 10 mg tablet Instructed patient per Anesthesia Guidelines   clotrimazole-betamethasone (LOTRISONE) 1-0 05 % cream Instructed patient per Anesthesia Guidelines   famotidine (PEPCID) 20 mg tablet Instructed patient per Anesthesia Guidelines   hydrochlorothiazide (HYDRODIURIL) 12 5 mg tablet Instructed patient per Anesthesia Guidelines   lisinopril (ZESTRIL) 10 mg tablet Instructed patient per Anesthesia Guidelines   metoprolol succinate (TOPROL-XL) 25 mg 24 hr tablet Instructed patient per Anesthesia Guidelines   pantoprazole (PROTONIX) 40 mg tablet Instructed patient per Anesthesia Guidelines   rivaroxaban (XARELTO) 20 mg tablet Patient was instructed by Physician and understands  Pre op and bathing instructions reviewed

## 2018-08-09 LAB — MISCELLANEOUS LAB TEST RESULT: NORMAL

## 2018-08-10 ENCOUNTER — ANESTHESIA (OUTPATIENT)
Dept: PERIOP | Facility: HOSPITAL | Age: 53
End: 2018-08-10
Payer: COMMERCIAL

## 2018-08-10 ENCOUNTER — HOSPITAL ENCOUNTER (OUTPATIENT)
Facility: HOSPITAL | Age: 53
Setting detail: OUTPATIENT SURGERY
Discharge: HOME | End: 2018-08-10
Attending: OTOLARYNGOLOGY | Admitting: OTOLARYNGOLOGY
Payer: COMMERCIAL

## 2018-08-10 ENCOUNTER — ANESTHESIA EVENT (OUTPATIENT)
Dept: PERIOP | Facility: HOSPITAL | Age: 53
End: 2018-08-10
Payer: COMMERCIAL

## 2018-08-10 VITALS
RESPIRATION RATE: 18 BRPM | OXYGEN SATURATION: 94 % | TEMPERATURE: 97.1 F | BODY MASS INDEX: 38.57 KG/M2 | HEART RATE: 62 BPM | HEIGHT: 66 IN | WEIGHT: 240 LBS | SYSTOLIC BLOOD PRESSURE: 138 MMHG | DIASTOLIC BLOOD PRESSURE: 72 MMHG

## 2018-08-10 DIAGNOSIS — J38.1 POLYP OF VOCAL CORD AND LARYNX: ICD-10-CM

## 2018-08-10 DIAGNOSIS — R49.0 DYSPHONIA: Primary | ICD-10-CM

## 2018-08-10 PROCEDURE — 88307 TISSUE EXAM BY PATHOLOGIST: CPT | Performed by: PATHOLOGY

## 2018-08-10 RX ORDER — HYDROCODONE BITARTRATE AND ACETAMINOPHEN 5; 325 MG/1; MG/1
1 TABLET ORAL ONCE
Status: COMPLETED | OUTPATIENT
Start: 2018-08-10 | End: 2018-08-10

## 2018-08-10 RX ORDER — ONDANSETRON 2 MG/ML
4 INJECTION INTRAMUSCULAR; INTRAVENOUS ONCE AS NEEDED
Status: DISCONTINUED | OUTPATIENT
Start: 2018-08-10 | End: 2018-08-10 | Stop reason: HOSPADM

## 2018-08-10 RX ORDER — HYDROCODONE BITARTRATE AND ACETAMINOPHEN 5; 325 MG/1; MG/1
1 TABLET ORAL EVERY 6 HOURS PRN
Qty: 10 TABLET | Refills: 0 | Status: SHIPPED | OUTPATIENT
Start: 2018-08-10 | End: 2018-08-20

## 2018-08-10 RX ORDER — MIDAZOLAM HYDROCHLORIDE 1 MG/ML
INJECTION INTRAMUSCULAR; INTRAVENOUS AS NEEDED
Status: DISCONTINUED | OUTPATIENT
Start: 2018-08-10 | End: 2018-08-10 | Stop reason: SURG

## 2018-08-10 RX ORDER — PROPOFOL 10 MG/ML
INJECTION, EMULSION INTRAVENOUS AS NEEDED
Status: DISCONTINUED | OUTPATIENT
Start: 2018-08-10 | End: 2018-08-10 | Stop reason: SURG

## 2018-08-10 RX ORDER — ONDANSETRON 2 MG/ML
INJECTION INTRAMUSCULAR; INTRAVENOUS AS NEEDED
Status: DISCONTINUED | OUTPATIENT
Start: 2018-08-10 | End: 2018-08-10 | Stop reason: SURG

## 2018-08-10 RX ORDER — LIDOCAINE HYDROCHLORIDE 20 MG/ML
INJECTION, SOLUTION EPIDURAL; INFILTRATION; INTRACAUDAL; PERINEURAL AS NEEDED
Status: DISCONTINUED | OUTPATIENT
Start: 2018-08-10 | End: 2018-08-10 | Stop reason: HOSPADM

## 2018-08-10 RX ORDER — SODIUM CHLORIDE 9 MG/ML
75 INJECTION, SOLUTION INTRAVENOUS CONTINUOUS
Status: CANCELLED | OUTPATIENT
Start: 2018-08-10

## 2018-08-10 RX ORDER — GLYCOPYRROLATE 0.2 MG/ML
INJECTION INTRAMUSCULAR; INTRAVENOUS AS NEEDED
Status: DISCONTINUED | OUTPATIENT
Start: 2018-08-10 | End: 2018-08-10 | Stop reason: SURG

## 2018-08-10 RX ORDER — ALBUTEROL SULFATE 2.5 MG/3ML
2.5 SOLUTION RESPIRATORY (INHALATION) ONCE AS NEEDED
Status: DISCONTINUED | OUTPATIENT
Start: 2018-08-10 | End: 2018-08-10 | Stop reason: HOSPADM

## 2018-08-10 RX ORDER — MEPERIDINE HYDROCHLORIDE 25 MG/ML
12.5 INJECTION INTRAMUSCULAR; INTRAVENOUS; SUBCUTANEOUS ONCE
Status: COMPLETED | OUTPATIENT
Start: 2018-08-10 | End: 2018-08-10

## 2018-08-10 RX ORDER — MAGNESIUM HYDROXIDE 1200 MG/15ML
LIQUID ORAL AS NEEDED
Status: DISCONTINUED | OUTPATIENT
Start: 2018-08-10 | End: 2018-08-10 | Stop reason: HOSPADM

## 2018-08-10 RX ORDER — LABETALOL HYDROCHLORIDE 5 MG/ML
5 INJECTION, SOLUTION INTRAVENOUS
Status: DISCONTINUED | OUTPATIENT
Start: 2018-08-10 | End: 2018-08-10 | Stop reason: HOSPADM

## 2018-08-10 RX ORDER — FENTANYL CITRATE/PF 50 MCG/ML
25 SYRINGE (ML) INJECTION
Status: COMPLETED | OUTPATIENT
Start: 2018-08-10 | End: 2018-08-10

## 2018-08-10 RX ORDER — SODIUM CHLORIDE 9 MG/ML
INJECTION, SOLUTION INTRAVENOUS CONTINUOUS PRN
Status: DISCONTINUED | OUTPATIENT
Start: 2018-08-10 | End: 2018-08-10 | Stop reason: SURG

## 2018-08-10 RX ORDER — LIDOCAINE HYDROCHLORIDE 10 MG/ML
INJECTION, SOLUTION INFILTRATION; PERINEURAL AS NEEDED
Status: DISCONTINUED | OUTPATIENT
Start: 2018-08-10 | End: 2018-08-10 | Stop reason: SURG

## 2018-08-10 RX ORDER — OXYMETAZOLINE HYDROCHLORIDE 0.05 G/100ML
SPRAY NASAL AS NEEDED
Status: DISCONTINUED | OUTPATIENT
Start: 2018-08-10 | End: 2018-08-10 | Stop reason: HOSPADM

## 2018-08-10 RX ORDER — SUCCINYLCHOLINE/SOD CL,ISO/PF 100 MG/5ML
SYRINGE (ML) INTRAVENOUS AS NEEDED
Status: DISCONTINUED | OUTPATIENT
Start: 2018-08-10 | End: 2018-08-10 | Stop reason: SURG

## 2018-08-10 RX ORDER — ROCURONIUM BROMIDE 10 MG/ML
INJECTION, SOLUTION INTRAVENOUS AS NEEDED
Status: DISCONTINUED | OUTPATIENT
Start: 2018-08-10 | End: 2018-08-10 | Stop reason: SURG

## 2018-08-10 RX ORDER — FENTANYL CITRATE 50 UG/ML
INJECTION, SOLUTION INTRAMUSCULAR; INTRAVENOUS AS NEEDED
Status: DISCONTINUED | OUTPATIENT
Start: 2018-08-10 | End: 2018-08-10 | Stop reason: SURG

## 2018-08-10 RX ADMIN — ROCURONIUM BROMIDE 20 MG: 10 INJECTION INTRAVENOUS at 07:42

## 2018-08-10 RX ADMIN — FENTANYL CITRATE 50 MCG: 50 INJECTION INTRAMUSCULAR; INTRAVENOUS at 07:33

## 2018-08-10 RX ADMIN — SUGAMMADEX 450 MG: 100 INJECTION, SOLUTION INTRAVENOUS at 08:55

## 2018-08-10 RX ADMIN — NEOSTIGMINE METHYLSULFATE 3 MG: 1 INJECTION, SOLUTION INTRAMUSCULAR; INTRAVENOUS; SUBCUTANEOUS at 08:48

## 2018-08-10 RX ADMIN — LIDOCAINE HYDROCHLORIDE 50 MG: 10 INJECTION, SOLUTION INFILTRATION; PERINEURAL at 07:33

## 2018-08-10 RX ADMIN — ONDANSETRON 4 MG: 2 INJECTION INTRAMUSCULAR; INTRAVENOUS at 07:45

## 2018-08-10 RX ADMIN — GLYCOPYRROLATE 0.4 MG: 0.2 INJECTION, SOLUTION INTRAMUSCULAR; INTRAVENOUS at 08:48

## 2018-08-10 RX ADMIN — SODIUM CHLORIDE: 0.9 INJECTION, SOLUTION INTRAVENOUS at 07:15

## 2018-08-10 RX ADMIN — MEPERIDINE HYDROCHLORIDE 12.5 MG: 25 INJECTION, SOLUTION INTRAMUSCULAR; INTRAVENOUS; SUBCUTANEOUS at 09:07

## 2018-08-10 RX ADMIN — ROCURONIUM BROMIDE 15 MG: 10 INJECTION INTRAVENOUS at 08:29

## 2018-08-10 RX ADMIN — FENTANYL CITRATE 25 MCG: 50 INJECTION INTRAMUSCULAR; INTRAVENOUS at 09:23

## 2018-08-10 RX ADMIN — FENTANYL CITRATE 50 MCG: 50 INJECTION INTRAMUSCULAR; INTRAVENOUS at 07:40

## 2018-08-10 RX ADMIN — PROPOFOL 200 MG: 10 INJECTION, EMULSION INTRAVENOUS at 07:33

## 2018-08-10 RX ADMIN — HYDROCODONE BITARTRATE AND ACETAMINOPHEN 1 TABLET: 5; 325 TABLET ORAL at 10:22

## 2018-08-10 RX ADMIN — SODIUM CHLORIDE: 0.9 INJECTION, SOLUTION INTRAVENOUS at 08:35

## 2018-08-10 RX ADMIN — DEXAMETHASONE SODIUM PHOSPHATE 8 MG: 10 INJECTION INTRAMUSCULAR; INTRAVENOUS at 07:45

## 2018-08-10 RX ADMIN — ROCURONIUM BROMIDE 15 MG: 10 INJECTION INTRAVENOUS at 08:08

## 2018-08-10 RX ADMIN — FENTANYL CITRATE 25 MCG: 50 INJECTION INTRAMUSCULAR; INTRAVENOUS at 09:13

## 2018-08-10 RX ADMIN — Medication 100 MG: at 07:34

## 2018-08-10 RX ADMIN — ROCURONIUM BROMIDE 10 MG: 10 INJECTION INTRAVENOUS at 07:33

## 2018-08-10 RX ADMIN — MIDAZOLAM HYDROCHLORIDE 2 MG: 1 INJECTION, SOLUTION INTRAMUSCULAR; INTRAVENOUS at 07:30

## 2018-08-10 RX ADMIN — FENTANYL CITRATE 25 MCG: 50 INJECTION INTRAMUSCULAR; INTRAVENOUS at 09:08

## 2018-08-10 RX ADMIN — FENTANYL CITRATE 25 MCG: 50 INJECTION INTRAMUSCULAR; INTRAVENOUS at 09:18

## 2018-08-10 NOTE — DISCHARGE INSTRUCTIONS
Resume Xarelto in 3 days    Absolute voice rest x 1 week  No talking, shouting, whispering  Avoid straining, heavy lifting, cough, throat clearing    Sniffing exercises: sniff in through nose 10 times every hour while awake to spread the vocal folds apart as they heal    If cough, use OTC cough suppressant or hydrocodone medication    Resume previous diet    Follow up with Dr Karina Alvarenga in 1 week

## 2018-08-10 NOTE — OP NOTE
OPERATIVE REPORT  PATIENT NAME: Skyler Cassidy    :  1965  MRN: [id number]  Pt Location: AN OR ROOM 04    SURGERY DATE: 8/10/2018    Surgeon(s) and Role:     * Cara Maher MD - Primary    Preop Diagnosis:  Dysphonia [R49 0]  Polyp of vocal folds  Vocal fold mass  Harper's edema    Post-Op Diagnosis Codes:  SAME    Procedure(s) (LRB):  MICRO DIRECT LARYNGOSCOPY WITH MICRO-FLAP EXCISION OF BILATERAL VOCAL FOLD POLYPS (71511-81), KTP LASER ABLATION OF HARPER'S EDEMA    Specimen(s):  Right and left vocal fold polyp    Estimated Blood Loss:   Minimal    Drains:  none    Anesthesia Type:   General    Operative Indications:  Dysphonia, smoking hx, polypoid corditis with bilateral vocal fold masses (polyps)  Operative Findings:  Severe polypoid corditis with polypoid masses bilaterally, the right side more suspicious for dysplasia/malignancy  Debulking with  blade and reduced Harper's edema with KTP laser    Complications:   None    Procedure and Technique:  After obtaining informed consent, patient was taken to the operating room by the anesthesia team   Patient was placed in the supine position on the operating room table  Time out was performed to confirm patient's name, ID, and procedure  Patient was intubated with size 5-0 laser safe ETT and turned 90 degrees  Eyes were protected with tape, wet towels, upper teeth were protected with toothguard  Medium female Sataloff laryngoscope was used to obtain view of the larynx and was suspended  Views were obtained with zero and 70 deg gottlieb milagros telescope and microscope  The right vocal fold pedunculated polyp was removed conservatively with microscissors and submitted to pathology  The left vocal fold pedunculated polyp was removed conservatively with microscissors and submitted to pathology  Additional Harper's edema was reduced with laryngeal  blade bilaterally      KTP laser (2W, 0 1s pulse) was used to reduce additional harper's edema and redundant epithelium  The remaining epithelium was redraped and secured to each vocal fold using KTP laser same settings  Oxymetazoline soaked pledgets were used for hemostasis  Upon completion, the vocal folds were sprayed with 2% lidocaine and excess suctioned  The laryngoscope and tooth guard were then removed and care of patient was returned to anesthesia for extubation  Patient was then taken to the postoperative anesthesia care unit for recovery       I was present for the entire procedure    Patient Disposition:  PACU     SIGNATURE: Aure Sheffield MD  DATE: August 10, 2018  TIME: 7:34 AM

## 2018-08-10 NOTE — ADDENDUM NOTE
Addendum  created 08/10/18 1034 by Noel Elizondo CRNA    Anesthesia Intra Meds edited, Orders acknowledged in Narrator

## 2018-08-10 NOTE — ANESTHESIA PREPROCEDURE EVALUATION
Review of Systems/Medical History  Patient summary reviewed  Chart reviewed      Cardiovascular  Exercise tolerance (METS): >4,  Pacemaker/AICD, Hyperlipidemia, Hypertension controlled, Dysrhythmias , atrial fibrillation,    Pulmonary  Smoker cigarette smoker  , Tobacco cessation counseling given , Sleep apnea ,        GI/Hepatic    GERD well controlled, Liver disease , Hepatitis C,        Negative  ROS        Endo/Other    Obesity    GYN  Negative gynecology ROS          Hematology  Negative hematology ROS      Musculoskeletal    Arthritis     Neurology  Negative neurology ROS      Psychology   Negative psychology ROS              Physical Exam    Airway    Mallampati score: II  TM Distance: >3 FB  Neck ROM: full     Dental   Comment: No teeth,     Cardiovascular  Rhythm: irregular,     Pulmonary  Pulmonary exam normal     Other Findings        Anesthesia Plan  ASA Score- 3     Anesthesia Type- general with ASA Monitors  Additional Monitors:   Airway Plan: ETT  Plan Factors-  Patient did not smoke on day of surgery  Induction- intravenous  Postoperative Plan-     Informed Consent- Anesthetic plan and risks discussed with patient  I personally reviewed this patient with the CRNA  Discussed and agreed on the Anesthesia Plan with the CRNA  Georgette Ormond

## 2018-08-10 NOTE — ANESTHESIA POSTPROCEDURE EVALUATION
Post-Op Assessment Note      CV Status:  Stable    Mental Status:  Alert and awake    Hydration Status:  Euvolemic    PONV Controlled:  Controlled    Airway Patency:  Patent    Post Op Vitals Reviewed: Yes          Staff: CRNA           BP   179/99   Temp  97 5   Pulse  76   Resp   18   SpO2   96%

## 2018-08-17 ENCOUNTER — REMOTE DEVICE CLINIC VISIT (OUTPATIENT)
Dept: CARDIOLOGY CLINIC | Facility: CLINIC | Age: 53
End: 2018-08-17
Payer: COMMERCIAL

## 2018-08-17 ENCOUNTER — APPOINTMENT (EMERGENCY)
Dept: RADIOLOGY | Facility: HOSPITAL | Age: 53
End: 2018-08-17
Payer: COMMERCIAL

## 2018-08-17 ENCOUNTER — HOSPITAL ENCOUNTER (EMERGENCY)
Facility: HOSPITAL | Age: 53
Discharge: HOME | End: 2018-08-17
Attending: EMERGENCY MEDICINE | Admitting: EMERGENCY MEDICINE
Payer: COMMERCIAL

## 2018-08-17 VITALS
WEIGHT: 232 LBS | HEIGHT: 66 IN | BODY MASS INDEX: 37.28 KG/M2 | HEART RATE: 72 BPM | DIASTOLIC BLOOD PRESSURE: 118 MMHG | SYSTOLIC BLOOD PRESSURE: 179 MMHG | RESPIRATION RATE: 18 BRPM | OXYGEN SATURATION: 96 %

## 2018-08-17 DIAGNOSIS — Z95.810 AICD (AUTOMATIC CARDIOVERTER/DEFIBRILLATOR) PRESENT: ICD-10-CM

## 2018-08-17 DIAGNOSIS — S60.221A CONTUSION OF RIGHT HAND, INITIAL ENCOUNTER: Primary | ICD-10-CM

## 2018-08-17 DIAGNOSIS — I47.2 VENTRICULAR TACHYCARDIA (HCC): Primary | ICD-10-CM

## 2018-08-17 PROCEDURE — 93295 DEV INTERROG REMOTE 1/2/MLT: CPT | Performed by: INTERNAL MEDICINE

## 2018-08-17 PROCEDURE — 93296 REM INTERROG EVL PM/IDS: CPT | Performed by: INTERNAL MEDICINE

## 2018-08-17 PROCEDURE — 93297 REM INTERROG DEV EVAL ICPMS: CPT | Performed by: INTERNAL MEDICINE

## 2018-08-17 PROCEDURE — 73130 X-RAY EXAM OF HAND: CPT

## 2018-08-17 PROCEDURE — 99283 EMERGENCY DEPT VISIT LOW MDM: CPT

## 2018-08-17 NOTE — PROGRESS NOTES
Results for orders placed or performed in visit on 08/17/18   Cardiac EP device report    Narrative    MDT DUAL ICD  CARELINK TRANSMISSION: BATTERY STATUS "OK"  AP 49%  0%  ALL AVAILABLE LEAD PARAMETERS WITHIN NORMAL LIMITS  1 VHR NOTED, NO THERAPIES GIVEN  PT ON METO SUCC & EF 60% (2016)  AVAIL EGRAM PRESENTS AS SVT  OPTI-VOL WITHIN NORMAL LIMITS  NORMAL DEVICE FUNCTION   NC

## 2018-08-17 NOTE — DISCHARGE INSTRUCTIONS
Contusion in Adults   WHAT YOU NEED TO KNOW:   A contusion is a bruise that appears on your skin after an injury  A bruise happens when small blood vessels tear but skin does not  When blood vessels tear, blood leaks into nearby tissue, such as soft tissue or muscle  DISCHARGE INSTRUCTIONS:   Return to the emergency department if:   · You have new trouble moving the injured area  · You have tingling or numbness in or near the injured area  · Your hand or foot below the bruise gets cold or turns pale  Contact your healthcare provider if:   · You find a new lump in the injured area  · Your symptoms do not improve with treatment after 4 to 5 days  · You have questions or concerns about your condition or care  Medicines: You may need any of the following:  · NSAIDs  help decrease swelling and pain or fever  This medicine is available with or without a doctor's order  NSAIDs can cause stomach bleeding or kidney problems in certain people  If you take blood thinner medicine, always ask your healthcare provider if NSAIDs are safe for you  Always read the medicine label and follow directions  · Prescription pain medicine  may be given  Do not wait until the pain is severe before you take your medicine  · Take your medicine as directed  Contact your healthcare provider if you think your medicine is not helping or if you have side effects  Tell him of her if you are allergic to any medicine  Keep a list of the medicines, vitamins, and herbs you take  Include the amounts, and when and why you take them  Bring the list or the pill bottles to follow-up visits  Carry your medicine list with you in case of an emergency  Follow up with your healthcare provider as directed: You may need to return within a week to check your injury again  Write down your questions so you remember to ask them during your visits    Help a contusion heal:   · Rest the injured area  or use it less than usual  If you bruised your leg or foot, you may need crutches or a cane to help you walk  This will help you keep weight off your injured body part  · Apply ice  to decrease swelling and pain  Ice may also help prevent tissue damage  Use an ice pack, or put crushed ice in a plastic bag  Cover it with a towel and place it on your bruise for 15 to 20 minutes every hour or as directed  · Use compression  to support the area and decrease swelling  Wrap an elastic bandage around the area over the bruised muscle  Make sure the bandage is not too tight  You should be able to fit 1 finger between the bandage and your skin  · Elevate (raise) your injured body part  above the level of your heart to help decrease pain and swelling  Use pillows, blankets, or rolled towels to elevate the area as often as you can  · Do not drink alcohol  as directed  Alcohol may slow healing  · Do not stretch injured muscles  right after your injury  Ask your healthcare provider when and how you may safely stretch after your injury  Gentle stretches can help increase your flexibility  · Do not massage the area or put heating pads  on the bruise right after your injury  Heat and massage may slow healing  Your healthcare provider may tell you to apply heat after several days  At that time, heat will start to help the injury heal   Prevent another contusion:   · Stretch and warm up before you play sports or exercise  · Wear protective gear when you play sports  Examples are shin guards and padding  · If you begin a new physical activity, start slowly to give your body a chance to adjust   © 2017 [employer] Information is for End User's use only and may not be sold, redistributed or otherwise used for commercial purposes  All illustrations and images included in CareNotes® are the copyrighted property of [employer]  or Amrik Lazo  The above information is an  only   It is not intended as medical advice for individual conditions or treatments  Talk to your doctor, nurse or pharmacist before following any medical regimen to see if it is safe and effective for you  How to Use an Elastic Bandage   WHAT YOU NEED TO KNOW:   An elastic bandage puts gentle, even pressure on the tissue around an injury to decrease pain and swelling  It also gives support to an injured area  DISCHARGE INSTRUCTIONS:   How to use an elastic bandage:  Elastic bandages come in many different sizes and lengths  They may come with metal clips or tape to fasten it in place  Ask your healthcare provider to show you how to wrap the bandage  The following steps will help you wrap an elastic bandage around your ankle  You can also wrap an elastic bandage around your knee, wrist, or elbow  · Hold the bandage so that the start of the roll is facing up  · Start the loose end of the bandage on the top of your foot beneath your toes  Leave your toes out of the bandage  · Hold the loose end of the bandage in place with one hand  With the other hand, wrap the bandage in a [place] twice around your foot  Always wrap the bandage from outside to inside  · After the top of your foot has been wrapped twice, move your hand to your heel  · Continue to wrap the bandage in a spiral pattern, like a figure 8  Leave your heel uncovered  Overlap the elastic bandage by ½ of its width each time you go around  ¨ Cross the bandage over your foot, moving upward, and pass it behind your ankle  ¨ Move the bandage down and cross it over the top of your foot  ¨ Wrap the bandage under your foot  Repeat this one more time  · Pass the bandage around your calf and wrap it in upward circles toward your knee  Stop wrapping below your knee  You do not need to wrap the bandage down your calf again  · Fasten the end to the rest of the bandage  Do not use metal clips where your skin folds or creases, such as behind your knee    Safety tips:   · Do not wrap the bandage too tight  because it may cut off blood flow  · Take off the bandage at least 2 times a day  Leave it off for a few minutes then wrap it again  Ask your healthcare provider if you should leave the bandage off at night  · Remove the bandage if you have numbness or tingling  or your limb turns cold or pale  Gently rub the area  Rewrap the bandage when the area feels better  · Wash the bandage each day  or when it gets limp  Keep an extra elastic bandage in case the one you are wearing gets wet or dirty  Follow up with your healthcare provider as directed:  Write down your questions so you remember to ask them during your visits  Contact your healthcare provider if:   · You have pain or cramping in the limb where the bandage is wrapped  · You have tingling or numbness that does not go away after you remove the bandage  · Your hand or foot stays cold or pale after you remove the bandage  · You see redness that was not there when the bandage was first applied  · You have questions or concerns about how to use an elastic bandage  © 2017 [facility] [facility] Information is for End User's use only and may not be sold, redistributed or otherwise used for commercial purposes  All illustrations and images included in CareNotes® are the copyrighted property of [employer] , [employer]  or Amrik Lazo  The above information is an  only  It is not intended as medical advice for individual conditions or treatments  Talk to your doctor, nurse or pharmacist before following any medical regimen to see if it is safe and effective for you

## 2018-08-17 NOTE — ED NOTES
Patient awake and alert  No distress noted  No further questions upon discharge       Emelia Rao RN  08/17/18 [id number]

## 2018-08-17 NOTE — ED PROVIDER NOTES
History  Chief Complaint   Patient presents with    Hand Injury     Pt  c/o right hand pain after punching a wall two days ago  This is a 45-year-old female patient who 2 days ago punched a wall out of anger with her right hand now complains of pain of her right 5th metacarpal   She has taken over-the-counter medication without improvement  She does have full range of motion but it hurts to move  No numbness or tingling no weakness no pain above or below the 5th metacarpal            Prior to Admission Medications   Prescriptions Last Dose Informant Patient Reported? Taking? HYDROcodone-acetaminophen (NORCO) 5-325 mg per tablet   No No   Sig: Take 1 tablet by mouth every 6 (six) hours as needed for pain (or cough) for up to 10 days Max Daily Amount: 4 tablets   amLODIPine (NORVASC) 10 mg tablet   Yes No   Sig: Take 10 mg by mouth daily  clotrimazole-betamethasone (LOTRISONE) 1-0 05 % cream   No No   Sig: Apply to affected area 2 times daily prn   famotidine (PEPCID) 20 mg tablet   Yes No   Sig: Take 20 mg by mouth daily   hydrochlorothiazide (HYDRODIURIL) 12 5 mg tablet   No No   Sig: Take 1 tablet by mouth daily for 30 days   lisinopril (ZESTRIL) 10 mg tablet   Yes No   Sig: Take 10 mg by mouth daily     meloxicam (MOBIC) 15 mg tablet   Yes No   Sig: Take 7 5 mg by mouth daily   metoprolol succinate (TOPROL-XL) 25 mg 24 hr tablet   No No   Sig: Take 1 tablet (25 mg total) by mouth 2 (two) times a day   pantoprazole (PROTONIX) 40 mg tablet   No No   Sig: Take 1 tablet (40 mg total) by mouth daily (30 min before breakfast)      Facility-Administered Medications: None       Past Medical History:   Diagnosis Date    Arthritis     Atrial fibrillation (HCC)     Atrial fibrillation (HCC)     Breast lump     GERD (gastroesophageal reflux disease)     H/O transfusion 1987    Hepatitis C     resolved    Hyperlipidemia     Hypertension     Irregular heart beat     Pacemaker     Sleep apnea     no cpap Past Surgical History:   Procedure Laterality Date    CARDIAC PACEMAKER PLACEMENT  2016    AFIB     CHOLECYSTECTOMY      COLONOSCOPY      ELBOW SURGERY      HYSTERECTOMY      HYSTERECTOMY      JOINT REPLACEMENT Left 2015    TKR    KNEE SURGERY Left     OH ESOPHAGOGASTRODUODENOSCOPY TRANSORAL DIAGNOSTIC N/A 5/2/2018    Procedure: ESOPHAGOGASTRODUODENOSCOPY (EGD); Surgeon: Mekhi Aguero MD;  Location: [facility]; Service: Gastroenterology    [place] LARYNGOSCOPY,DIRCT,OP [facility] TUMR N/A 8/10/2018    Procedure: MICRO DIRECT LARYNGOSCOPY , EXCISION OF POLYPS, KTP LASER;  Surgeon: Kimmy Mccaeb MD;  [facility];  Service: ENT    REPLACEMENT TOTAL KNEE Left        Family History   Problem Relation Age of Onset    Arthritis Family     Cancer Family     Diabetes Family     Hypertension Family      I have reviewed and agree with the history as documented  Social History   Substance Use Topics    Smoking status: Current Every Day Smoker     Packs/day: 0 25     Years: 0 00     Types: Cigarettes    Smokeless tobacco: Never Used    Alcohol use No        Review of Systems   All other systems reviewed and are negative  Physical Exam  Physical Exam   Constitutional: She appears well-developed and well-nourished  HENT:   Head: Normocephalic and atraumatic  Right Ear: External ear normal    Left Ear: External ear normal    Nose: Nose normal    Mouth/Throat: Oropharynx is clear and moist    Eyes: Conjunctivae are normal  Pupils are equal, round, and reactive to light  Neck: Normal range of motion  Neck supple  Cardiovascular: Normal rate and regular rhythm  Pulmonary/Chest: Effort normal and breath sounds normal    Abdominal: Soft  Bowel sounds are normal  There is no tenderness  Musculoskeletal: Normal range of motion  Hands:  Neurological: She is alert  Skin: Skin is warm  Psychiatric: She has a normal mood and affect   Her behavior is normal    Nursing note and vitals reviewed  Vital Signs  ED Triage Vitals   Temp Pulse Respirations Blood Pressure SpO2   -- 08/17/18 1059 08/17/18 1059 08/17/18 1103 08/17/18 1059    72 18 (!) 179/118 96 %      Temp src Heart Rate Source Patient Position - Orthostatic VS BP Location FiO2 (%)   -- -- -- -- --             Pain Score       08/17/18 1059       7           Vitals:    08/17/18 1059 08/17/18 1103   BP:  (!) 179/118   Pulse: 72        Visual Acuity      ED Medications  Medications - No data to display    Diagnostic Studies  Results Reviewed     None                 XR hand 3+ views RIGHT   ED Interpretation by Kofi Mac PA-C (08/17 1134)   No acute fracture                 Procedures  Static Splint Application  Date/Time: 8/17/2018 11:37 AM  Performed by: Jax Mendez  Authorized by: Jax Mendez     Comments: Ace wrap was applied, Applied by technician, good position, neurovascular tendon intact, good capillary refill  Evaluated by me prior to discharge  Phone Contacts  ED Phone Contact    ED Course                               MDM  CritCare Time    Disposition  Final diagnoses:   Contusion of right hand, initial encounter     Time reflects when diagnosis was documented in both MDM as applicable and the Disposition within this note     Time User Action Codes Description Comment    8/17/2018 11:35 AM Spenser Aguilar Add [[id number]] Contusion of right hand, initial encounter       ED Disposition     ED Disposition Condition Comment    Discharge  Wilner Ovalles discharge to home/self care  Condition at discharge: Good        Follow-up Information     Follow up With Specialties Details Why Contact Info    Olaf Hugo MD Internal Medicine Schedule an appointment as soon as possible for a visit  [phone]            Patient's Medications   Discharge Prescriptions    No medications on file     No discharge procedures on file      ED Provider  Electronically Signed by           Ti Hernandez PA-C  08/17/18 Lina Dennis 106, CHARITY  08/17/18 1139

## 2018-09-08 ENCOUNTER — HOSPITAL ENCOUNTER (EMERGENCY)
Facility: HOSPITAL | Age: 53
Discharge: HOME | End: 2018-09-08
Attending: EMERGENCY MEDICINE | Admitting: EMERGENCY MEDICINE
Payer: COMMERCIAL

## 2018-09-08 ENCOUNTER — APPOINTMENT (EMERGENCY)
Dept: RADIOLOGY | Facility: HOSPITAL | Age: 53
End: 2018-09-08
Payer: COMMERCIAL

## 2018-09-08 VITALS
DIASTOLIC BLOOD PRESSURE: 86 MMHG | RESPIRATION RATE: 18 BRPM | HEART RATE: 64 BPM | TEMPERATURE: 98 F | OXYGEN SATURATION: 97 % | WEIGHT: 254.41 LBS | BODY MASS INDEX: 41.06 KG/M2 | SYSTOLIC BLOOD PRESSURE: 180 MMHG

## 2018-09-08 DIAGNOSIS — R60.0 LOWER LEG EDEMA: Primary | ICD-10-CM

## 2018-09-08 LAB
ALBUMIN SERPL BCP-MCNC: 3.7 G/DL (ref 3.5–5)
ALP SERPL-CCNC: 95 U/L (ref 46–116)
ALT SERPL W P-5'-P-CCNC: 41 U/L (ref 12–78)
ANION GAP SERPL CALCULATED.3IONS-SCNC: 8 MMOL/L (ref 4–13)
AST SERPL W P-5'-P-CCNC: 28 U/L (ref 5–45)
BASOPHILS # BLD AUTO: 0.03 THOUSANDS/ΜL (ref 0–0.1)
BASOPHILS NFR BLD AUTO: 1 % (ref 0–1)
BILIRUB SERPL-MCNC: 0.3 MG/DL (ref 0.2–1)
BUN SERPL-MCNC: 20 MG/DL (ref 5–25)
CALCIUM SERPL-MCNC: 8.6 MG/DL (ref 8.3–10.1)
CHLORIDE SERPL-SCNC: 106 MMOL/L (ref 100–108)
CO2 SERPL-SCNC: 27 MMOL/L (ref 21–32)
CREAT SERPL-MCNC: 1.39 MG/DL (ref 0.6–1.3)
EOSINOPHIL # BLD AUTO: 0.12 THOUSAND/ΜL (ref 0–0.61)
EOSINOPHIL NFR BLD AUTO: 2 % (ref 0–6)
ERYTHROCYTE [DISTWIDTH] IN BLOOD BY AUTOMATED COUNT: 13.1 % (ref 11.6–15.1)
GFR SERPL CREATININE-BSD FRML MDRD: 43 ML/MIN/1.73SQ M
GLUCOSE SERPL-MCNC: 114 MG/DL (ref 65–140)
HCT VFR BLD AUTO: 36.8 % (ref 34.8–46.1)
HGB BLD-MCNC: 11.7 G/DL (ref 11.5–15.4)
IMM GRANULOCYTES # BLD AUTO: 0.02 THOUSAND/UL (ref 0–0.2)
IMM GRANULOCYTES NFR BLD AUTO: 0 % (ref 0–2)
LYMPHOCYTES # BLD AUTO: 1.27 THOUSANDS/ΜL (ref 0.6–4.47)
LYMPHOCYTES NFR BLD AUTO: 25 % (ref 14–44)
MCH RBC QN AUTO: 27.4 PG (ref 26.8–34.3)
MCHC RBC AUTO-ENTMCNC: 31.8 G/DL (ref 31.4–37.4)
MCV RBC AUTO: 86 FL (ref 82–98)
MONOCYTES # BLD AUTO: 0.36 THOUSAND/ΜL (ref 0.17–1.22)
MONOCYTES NFR BLD AUTO: 7 % (ref 4–12)
NEUTROPHILS # BLD AUTO: 3.3 THOUSANDS/ΜL (ref 1.85–7.62)
NEUTS SEG NFR BLD AUTO: 65 % (ref 43–75)
NRBC BLD AUTO-RTO: 0 /100 WBCS
NT-PROBNP SERPL-MCNC: 1508 PG/ML
PLATELET # BLD AUTO: 144 THOUSANDS/UL (ref 149–390)
PMV BLD AUTO: 10.7 FL (ref 8.9–12.7)
POTASSIUM SERPL-SCNC: 4.2 MMOL/L (ref 3.5–5.3)
PROT SERPL-MCNC: 7.6 G/DL (ref 6.4–8.2)
RBC # BLD AUTO: 4.27 MILLION/UL (ref 3.81–5.12)
SODIUM SERPL-SCNC: 141 MMOL/L (ref 136–145)
TROPONIN I SERPL-MCNC: 0.03 NG/ML
WBC # BLD AUTO: 5.1 THOUSAND/UL (ref 4.31–10.16)

## 2018-09-08 PROCEDURE — 36415 COLL VENOUS BLD VENIPUNCTURE: CPT | Performed by: PHYSICIAN ASSISTANT

## 2018-09-08 PROCEDURE — 83880 ASSAY OF NATRIURETIC PEPTIDE: CPT | Performed by: PHYSICIAN ASSISTANT

## 2018-09-08 PROCEDURE — 84484 ASSAY OF TROPONIN QUANT: CPT | Performed by: PHYSICIAN ASSISTANT

## 2018-09-08 PROCEDURE — 85025 COMPLETE CBC W/AUTO DIFF WBC: CPT | Performed by: PHYSICIAN ASSISTANT

## 2018-09-08 PROCEDURE — 93005 ELECTROCARDIOGRAM TRACING: CPT

## 2018-09-08 PROCEDURE — 71046 X-RAY EXAM CHEST 2 VIEWS: CPT

## 2018-09-08 PROCEDURE — 80053 COMPREHEN METABOLIC PANEL: CPT | Performed by: PHYSICIAN ASSISTANT

## 2018-09-08 PROCEDURE — 99284 EMERGENCY DEPT VISIT MOD MDM: CPT

## 2018-09-08 RX ORDER — MORPHINE SULFATE 15 MG/1
7.5 TABLET ORAL ONCE
Status: COMPLETED | OUTPATIENT
Start: 2018-09-08 | End: 2018-09-08

## 2018-09-08 RX ADMIN — MORPHINE SULFATE 7.5 MG: 15 TABLET ORAL at 11:12

## 2018-09-08 NOTE — ED PROVIDER NOTES
History  Chief Complaint   Patient presents with    Leg Swelling     pt states since yesterday morning has been having bilateral leg swelling around shin/ankle area  pt states "i feel like my legs weigh a ton"  pt has hx of Afib, pacemaker  Leg Pain   Location:  Leg  Time since incident:  2 days  Leg location:  L lower leg and R lower leg  Pain details:     Quality: heaviness  Radiates to:  Does not radiate    Severity:  Mild    Onset quality:  Gradual    Duration:  2 days    Timing:  Constant    Progression:  Unchanged  Chronicity:  Recurrent  Associated symptoms: no fatigue, no fever and no neck pain        Prior to Admission Medications   Prescriptions Last Dose Informant Patient Reported? Taking? amLODIPine (NORVASC) 10 mg tablet   Yes No   Sig: Take 10 mg by mouth daily  clotrimazole-betamethasone (LOTRISONE) 1-0 05 % cream   No No   Sig: Apply to affected area 2 times daily prn   famotidine (PEPCID) 20 mg tablet   Yes No   Sig: Take 20 mg by mouth daily   hydrochlorothiazide (HYDRODIURIL) 12 5 mg tablet   No No   Sig: Take 1 tablet by mouth daily for 30 days   lisinopril (ZESTRIL) 10 mg tablet   Yes No   Sig: Take 10 mg by mouth daily     meloxicam (MOBIC) 15 mg tablet   Yes No   Sig: Take 7 5 mg by mouth daily   metoprolol succinate (TOPROL-XL) 25 mg 24 hr tablet   No No   Sig: Take 1 tablet (25 mg total) by mouth 2 (two) times a day   pantoprazole (PROTONIX) 40 mg tablet   No No   Sig: Take 1 tablet (40 mg total) by mouth daily (30 min before breakfast)      Facility-Administered Medications: None       Past Medical History:   Diagnosis Date    Arthritis     Atrial fibrillation (HCC)     Atrial fibrillation (HCC)     Breast lump     GERD (gastroesophageal reflux disease)     H/O transfusion 1987    Hepatitis C     resolved    Hyperlipidemia     Hypertension     Irregular heart beat     Pacemaker     Sleep apnea     no cpap       Past Surgical History:   Procedure Laterality Date  CARDIAC PACEMAKER PLACEMENT  2016    AFIB     CHOLECYSTECTOMY      COLONOSCOPY      ELBOW SURGERY      HYSTERECTOMY      HYSTERECTOMY      JOINT REPLACEMENT Left 2015    TKR    KNEE SURGERY Left     CO ESOPHAGOGASTRODUODENOSCOPY TRANSORAL DIAGNOSTIC N/A 5/2/2018    Procedure: ESOPHAGOGASTRODUODENOSCOPY (EGD); Surgeon: Ralf Gomez MD;  Location: [facility]; Service: Gastroenterology    CO LARYNGOSCOPY,DIRCT,OP [facility] TUMR N/A 8/10/2018    Procedure: MICRO DIRECT LARYNGOSCOPY , EXCISION OF POLYPS, KTP LASER;  Surgeon: Amalia Boston MD;  [facility];  Service: ENT    REPLACEMENT TOTAL KNEE Left     THROAT SURGERY      polyps removed       Family History   Problem Relation Age of Onset    Arthritis Family     Cancer Family     Diabetes Family     Hypertension Family      I have reviewed and agree with the history as documented  Social History   Substance Use Topics    Smoking status: Current Every Day Smoker     Packs/day: 0 25     Years: 0 00     Types: Cigarettes    Smokeless tobacco: Never Used    Alcohol use No        Review of Systems   Constitutional: Negative for activity change, chills, fatigue and fever  HENT: Negative for congestion, ear pain, mouth sores, sore throat and trouble swallowing  Eyes: Negative for photophobia and visual disturbance  Respiratory: Negative for chest tightness, shortness of breath and wheezing  Cardiovascular: Negative for chest pain and palpitations  Gastrointestinal: Negative for abdominal pain, constipation, diarrhea, nausea and vomiting  Genitourinary: Negative for decreased urine volume, difficulty urinating, dysuria, genital sores and hematuria  Musculoskeletal: Negative for arthralgias, myalgias, neck pain and neck stiffness  Skin: Negative for rash and wound  Leg swelling     Neurological: Negative for dizziness, syncope, weakness, light-headedness, numbness and headaches     Hematological: Negative for adenopathy  All other systems reviewed and are negative  Physical Exam  Physical Exam   Constitutional: She is oriented to person, place, and time  She appears well-developed and well-nourished  No distress  HENT:   Head: Normocephalic and atraumatic  Right Ear: External ear normal    Left Ear: External ear normal    Nose: Nose normal    Mouth/Throat: Oropharynx is clear and moist    Eyes: Conjunctivae and EOM are normal  Pupils are equal, round, and reactive to light  No scleral icterus  Neck: Normal range of motion  Neck supple  Cardiovascular: Normal rate, regular rhythm, normal heart sounds and intact distal pulses  Exam reveals no gallop and no friction rub  No murmur heard  Pulmonary/Chest: Effort normal  No respiratory distress  She has no wheezes  She has rales  Slight rales in left lower lung field   Abdominal: Soft  She exhibits no distension  There is no tenderness  There is no guarding  Musculoskeletal: Normal range of motion  She exhibits no edema, tenderness or deformity  Lymphadenopathy:     She has no cervical adenopathy  Neurological: She is alert and oriented to person, place, and time  Skin: Skin is warm and dry  Capillary refill takes less than 2 seconds  She is not diaphoretic  Nursing note and vitals reviewed        Vital Signs  ED Triage Vitals [09/08/18 1022]   Temperature Pulse Respirations Blood Pressure SpO2   98 °F (36 7 °C) 60 18 170/90 98 %      Temp Source Heart Rate Source Patient Position - Orthostatic VS BP Location FiO2 (%)   Oral Monitor Sitting Right arm --      Pain Score       8           Vitals:    09/08/18 1022 09/08/18 1225 09/08/18 1248   BP: 170/90 (!) 183/113 (!) 180/86   Pulse: 60 60 64   Patient Position - Orthostatic VS: Sitting Sitting Sitting       Visual Acuity      ED Medications  Medications   morphine (MSIR) IR tablet 7 5 mg (7 5 mg Oral Given 9/8/18 1112)       Diagnostic Studies  Results Reviewed     Procedure Component Value Units Date/Time    B-type natriuretic peptide [[id number]]  (Abnormal) Collected:  09/08/18 1122    Lab Status:  Final result Specimen:  Blood from Arm, Right Updated:  09/08/18 1152     NT-proBNP 1,508 (H) pg/mL     Troponin I [[id number]]  (Normal) Collected:  09/08/18 1122    Lab Status:  Final result Specimen:  Blood from Arm, Right Updated:  09/08/18 1147     Troponin I 0 03 ng/mL     Comprehensive metabolic panel [[id number]]  (Abnormal) Collected:  09/08/18 1122    Lab Status:  Final result Specimen:  Blood from Arm, Right Updated:  09/08/18 1145     Sodium 141 mmol/L      Potassium 4 2 mmol/L      Chloride 106 mmol/L      CO2 27 mmol/L      ANION GAP 8 mmol/L      BUN 20 mg/dL      Creatinine 1 39 (H) mg/dL      Glucose 114 mg/dL      Calcium 8 6 mg/dL      AST 28 U/L      ALT 41 U/L      Alkaline Phosphatase 95 U/L      Total Protein 7 6 g/dL      Albumin 3 7 g/dL      Total Bilirubin 0 30 mg/dL      eGFR 43 ml/min/1 73sq m     Narrative:         National Kidney Disease Education Program recommendations are as follows:  GFR calculation is accurate only with a steady state creatinine  Chronic Kidney disease less than 60 ml/min/1 73 sq  meters  Kidney failure less than 15 ml/min/1 73 sq  meters      CBC and differential [[id number]]  (Abnormal) Collected:  09/08/18 1122    Lab Status:  Final result Specimen:  Blood from Arm, Right Updated:  09/08/18 1129     WBC 5 10 Thousand/uL      RBC 4 27 Million/uL      Hemoglobin 11 7 g/dL      Hematocrit 36 8 %      MCV 86 fL      MCH 27 4 pg      MCHC 31 8 g/dL      RDW 13 1 %      MPV 10 7 fL      Platelets 892 (L) Thousands/uL      nRBC 0 /100 WBCs      Neutrophils Relative 65 %      Immat GRANS % 0 %      Lymphocytes Relative 25 %      Monocytes Relative 7 %      Eosinophils Relative 2 %      Basophils Relative 1 %      Neutrophils Absolute 3 30 Thousands/µL      Immature Grans Absolute 0 02 Thousand/uL      Lymphocytes Absolute 1 27 Thousands/µL      Monocytes Absolute 0 36 Thousand/µL      Eosinophils Absolute 0 12 Thousand/µL      Basophils Absolute 0 03 Thousands/µL                  XR chest 2 views    (Results Pending)              Procedures  Procedures       Phone Contacts  ED Phone Contact    ED Course  ED Course as of Sep 08 1324   Sat Sep 08, 2018   1149 Procedure Note: EKG  Date/Time: 09/08/18 11:49 AM   Performed by: aJsmin Chinchilla by: Yuni Lockwood  ECG interpreted by me, the ED Provider: yes   The EKG demonstrates:  Rate 59  Rhythm NSR  QTc 459  Persistent T wave inversion in septal and lateral leads  No twave  inversion in II, however old was paced rhythm  RBBB noted                                  MDM  Number of Diagnoses or Management Options  Lower leg edema: new and requires workup  Diagnosis management comments: Differential diagnosis to include not limited to: CHF, DANIELA, ACS     Patient is a 77-year-old male with history of COPD/cor pulmonale, hypertension, atrial fibrillation presents emergency department for evaluation of leg swelling  Patient states that she has noticed bilateral lower leg swelling for the last 2 days  Patient states that she has atrial fibrillation and a pacemaker and is on chronic anticoagulation  She has never had bilateral lower extremity swelling before  She has no chest pain, shortness of breath or dyspnea on exertion  No orthopnea  Patient has not on any diuretics daily  She reports no feelings of defer blurred discharge  She does have history of hypertrophic cardiomyopathy  Plan will be to get EKG, CBC, CMP, BNP and chest x-ray  This may be secondary to CHF  However patient has no symptoms other than the foot at this time    Patient will be educated on low-sodium diet, compression stockings, PCP follow-up       Amount and/or Complexity of Data Reviewed  Clinical lab tests: ordered and reviewed  Tests in the radiology section of CPT®: ordered and reviewed  Independent visualization of images, tracings, or specimens: yes    Risk of Complications, Morbidity, and/or Mortality  Presenting problems: moderate  Diagnostic procedures: moderate  Management options: moderate    Patient Progress  Patient progress: stable    CritCare Time    Disposition  Final diagnoses:   Lower leg edema     Time reflects when diagnosis was documented in both MDM as applicable and the Disposition within this note     Time User Action Codes Description Comment    9/8/2018 12:47 PM Kody Pena Add [R60 0] Lower leg edema       ED Disposition     ED Disposition Condition Comment    Discharge  Huong Bonilla discharge to home/self care  Condition at discharge: Stable        Follow-up Information     Follow up With Specialties Details Why Contact Info Additional Information    Josselin Louis MD Internal Medicine Call in 2 days make follow up appointment [phone]       [place] [address] Emergency Department Emergency Medicine  If symptoms worsen [address]  [facility],  [address], [place], [place], [address]          Discharge Medication List as of 9/8/2018 12:49 PM      CONTINUE these medications which have NOT CHANGED    Details   amLODIPine (NORVASC) 10 mg tablet Take 10 mg by mouth daily  , Until Discontinued, Historical Med      clotrimazole-betamethasone (LOTRISONE) 1-0 05 % cream Apply to affected area 2 times daily prn, Normal      famotidine (PEPCID) 20 mg tablet Take 20 mg by mouth daily, Until Discontinued, Historical Med      hydrochlorothiazide (HYDRODIURIL) 12 5 mg tablet Take 1 tablet by mouth daily for 30 days, Starting Thu 5/11/2017, Until Mon 8/6/2018, Print      lisinopril (ZESTRIL) 10 mg tablet Take 10 mg by mouth daily  , Until Discontinued, Historical Med      meloxicam (MOBIC) 15 mg tablet Take 7 5 mg by mouth daily, Until Discontinued, Historical Med      metoprolol succinate (TOPROL-XL) 25 mg 24 hr tablet Take 1 tablet (25 mg total) by mouth 2 (two) times a day, Starting Tue 5/15/2018, Normal      pantoprazole (PROTONIX) 40 mg tablet Take 1 tablet (40 mg total) by mouth daily (30 min before breakfast), Starting Fri 6/22/2018, Normal             Outpatient Discharge Orders  Compression [facility]         ED Provider  Electronically Signed by           Prudencio Ross PA-C  09/08/18 6751

## 2018-09-08 NOTE — DISCHARGE INSTRUCTIONS
Leg Edema   WHAT YOU NEED TO KNOW:   Leg edema is swelling caused by fluid buildup  Your legs may swell if you sit or stand for long periods of time, are pregnant, or are injured  Swelling may also occur if you have heart failure or circulation problems  This means that your heart does not pump blood through your body as it should  DISCHARGE INSTRUCTIONS:   Self-care:   · Elevate your legs:  Raise your legs above the level of your heart as often as you can  This will help decrease swelling and pain  Prop your legs on pillows or blankets to keep them elevated comfortably  · Wear pressure stockings: These tight stockings put pressure on your legs to promote blood flow and prevent blood clots  Wear the stockings during the day  Do not wear them while you sleep  · Apply heat:  Heat helps decrease pain and swelling  Apply heat on the area for 20 to 30 minutes every 2 hours for as many days as directed  · Stay active:  Do not stand or sit for long periods of time  Ask your healthcare provider about the best exercise plan for you  · Eat healthy foods:  Healthy foods include fruits, vegetables, whole-grain breads, low-fat dairy products, beans, lean meats, and fish  Ask if you need to be on a special diet  Limit salt  Salt will make your body hold even more fluid  Follow up with your healthcare provider as directed:  Write down your questions so you remember to ask them during your visits  Contact your healthcare provider if:   · You have a fever or feel more tired than usual     · The veins in your legs look larger than usual  They may look full or bulging  · Your legs itch or feel heavy  · You have red or white areas or sores on your legs  The skin may also appear dimpled or have indentations  · You are gaining weight  · You have trouble moving your ankles  · The swelling does not go away, or other parts of your body swell      · You have questions or concerns about your condition or care   Return to the emergency department if:   · You cannot walk  · You feel faint or confused  · Your skin turns blue or gray  · Your leg feels warm, tender, and painful  It may be swollen and red  · You have chest pain or trouble breathing that is worse when you lie down  · You suddenly feel lightheaded and have trouble breathing  · You have new and sudden chest pain  You may have more pain when you take deep breaths or cough  You may also cough up blood  © 2017 [address]  Information is for End User's use only and may not be sold, redistributed or otherwise used for commercial purposes  All illustrations and images included in CareNotes® are the copyrighted property of A D [employer] M [employer]  or [employer]  The above information is an  only  It is not intended as medical advice for individual conditions or treatments  Talk to your doctor, nurse or pharmacist before following any medical regimen to see if it is safe and effective for you

## 2018-09-10 LAB
ATRIAL RATE: 59 BPM
P AXIS: 52 DEGREES
QRS AXIS: -20 DEGREES
QRSD INTERVAL: 130 MS
QT INTERVAL: 464 MS
QTC INTERVAL: 459 MS
T WAVE AXIS: 177 DEGREES
VENTRICULAR RATE: 59 BPM

## 2018-09-10 PROCEDURE — 93010 ELECTROCARDIOGRAM REPORT: CPT | Performed by: INTERNAL MEDICINE

## 2018-10-07 ENCOUNTER — HOSPITAL ENCOUNTER (EMERGENCY)
Facility: HOSPITAL | Age: 53
Discharge: HOME | End: 2018-10-07
Attending: EMERGENCY MEDICINE | Admitting: EMERGENCY MEDICINE
Payer: COMMERCIAL

## 2018-10-07 ENCOUNTER — APPOINTMENT (EMERGENCY)
Dept: RADIOLOGY | Facility: HOSPITAL | Age: 53
End: 2018-10-07
Payer: COMMERCIAL

## 2018-10-07 VITALS
WEIGHT: 255.73 LBS | HEART RATE: 62 BPM | SYSTOLIC BLOOD PRESSURE: 148 MMHG | DIASTOLIC BLOOD PRESSURE: 75 MMHG | RESPIRATION RATE: 18 BRPM | OXYGEN SATURATION: 97 % | TEMPERATURE: 98.4 F | BODY MASS INDEX: 41.28 KG/M2

## 2018-10-07 DIAGNOSIS — L03.119 LOWER EXTREMITY CELLULITIS: ICD-10-CM

## 2018-10-07 DIAGNOSIS — R60.0 LOWER EXTREMITY EDEMA: Primary | ICD-10-CM

## 2018-10-07 LAB
ALBUMIN SERPL BCP-MCNC: 3.7 G/DL (ref 3.5–5)
ALP SERPL-CCNC: 76 U/L (ref 46–116)
ALT SERPL W P-5'-P-CCNC: 43 U/L (ref 12–78)
ANION GAP SERPL CALCULATED.3IONS-SCNC: 5 MMOL/L (ref 4–13)
AST SERPL W P-5'-P-CCNC: 35 U/L (ref 5–45)
BASOPHILS # BLD AUTO: 0.02 THOUSANDS/ΜL (ref 0–0.1)
BASOPHILS NFR BLD AUTO: 0 % (ref 0–1)
BILIRUB SERPL-MCNC: 0.3 MG/DL (ref 0.2–1)
BUN SERPL-MCNC: 17 MG/DL (ref 5–25)
CALCIUM SERPL-MCNC: 8.7 MG/DL (ref 8.3–10.1)
CHLORIDE SERPL-SCNC: 105 MMOL/L (ref 100–108)
CO2 SERPL-SCNC: 30 MMOL/L (ref 21–32)
CREAT SERPL-MCNC: 1.27 MG/DL (ref 0.6–1.3)
EOSINOPHIL # BLD AUTO: 0.16 THOUSAND/ΜL (ref 0–0.61)
EOSINOPHIL NFR BLD AUTO: 3 % (ref 0–6)
ERYTHROCYTE [DISTWIDTH] IN BLOOD BY AUTOMATED COUNT: 13 % (ref 11.6–15.1)
GFR SERPL CREATININE-BSD FRML MDRD: 48 ML/MIN/1.73SQ M
GLUCOSE SERPL-MCNC: 81 MG/DL (ref 65–140)
HCT VFR BLD AUTO: 38.2 % (ref 34.8–46.1)
HGB BLD-MCNC: 12.2 G/DL (ref 11.5–15.4)
IMM GRANULOCYTES # BLD AUTO: 0.03 THOUSAND/UL (ref 0–0.2)
IMM GRANULOCYTES NFR BLD AUTO: 1 % (ref 0–2)
LYMPHOCYTES # BLD AUTO: 1.3 THOUSANDS/ΜL (ref 0.6–4.47)
LYMPHOCYTES NFR BLD AUTO: 25 % (ref 14–44)
MCH RBC QN AUTO: 27.2 PG (ref 26.8–34.3)
MCHC RBC AUTO-ENTMCNC: 31.9 G/DL (ref 31.4–37.4)
MCV RBC AUTO: 85 FL (ref 82–98)
MONOCYTES # BLD AUTO: 0.44 THOUSAND/ΜL (ref 0.17–1.22)
MONOCYTES NFR BLD AUTO: 8 % (ref 4–12)
NEUTROPHILS # BLD AUTO: 3.35 THOUSANDS/ΜL (ref 1.85–7.62)
NEUTS SEG NFR BLD AUTO: 63 % (ref 43–75)
NRBC BLD AUTO-RTO: 0 /100 WBCS
NT-PROBNP SERPL-MCNC: 2724 PG/ML
PLATELET # BLD AUTO: 150 THOUSANDS/UL (ref 149–390)
PMV BLD AUTO: 11.2 FL (ref 8.9–12.7)
POTASSIUM SERPL-SCNC: 4.6 MMOL/L (ref 3.5–5.3)
PROT SERPL-MCNC: 7.6 G/DL (ref 6.4–8.2)
RBC # BLD AUTO: 4.49 MILLION/UL (ref 3.81–5.12)
SODIUM SERPL-SCNC: 140 MMOL/L (ref 136–145)
TROPONIN I SERPL-MCNC: 0.04 NG/ML
WBC # BLD AUTO: 5.3 THOUSAND/UL (ref 4.31–10.16)

## 2018-10-07 PROCEDURE — 99284 EMERGENCY DEPT VISIT MOD MDM: CPT

## 2018-10-07 PROCEDURE — 71046 X-RAY EXAM CHEST 2 VIEWS: CPT

## 2018-10-07 PROCEDURE — 83880 ASSAY OF NATRIURETIC PEPTIDE: CPT | Performed by: EMERGENCY MEDICINE

## 2018-10-07 PROCEDURE — 85025 COMPLETE CBC W/AUTO DIFF WBC: CPT | Performed by: EMERGENCY MEDICINE

## 2018-10-07 PROCEDURE — 80053 COMPREHEN METABOLIC PANEL: CPT | Performed by: EMERGENCY MEDICINE

## 2018-10-07 PROCEDURE — 93005 ELECTROCARDIOGRAM TRACING: CPT

## 2018-10-07 PROCEDURE — 84484 ASSAY OF TROPONIN QUANT: CPT | Performed by: EMERGENCY MEDICINE

## 2018-10-07 PROCEDURE — 36415 COLL VENOUS BLD VENIPUNCTURE: CPT | Performed by: EMERGENCY MEDICINE

## 2018-10-07 RX ORDER — OXYCODONE HYDROCHLORIDE AND ACETAMINOPHEN 5; 325 MG/1; MG/1
1 TABLET ORAL ONCE
Status: COMPLETED | OUTPATIENT
Start: 2018-10-07 | End: 2018-10-07

## 2018-10-07 RX ORDER — CEPHALEXIN 250 MG/1
250 CAPSULE ORAL EVERY 6 HOURS SCHEDULED
Qty: 28 CAPSULE | Refills: 0 | Status: SHIPPED | OUTPATIENT
Start: 2018-10-07 | End: 2018-10-14

## 2018-10-07 RX ADMIN — OXYCODONE HYDROCHLORIDE AND ACETAMINOPHEN 1 TABLET: 5; 325 TABLET ORAL at 13:32

## 2018-10-07 NOTE — DISCHARGE INSTRUCTIONS
Cellulitis   WHAT YOU NEED TO KNOW:   Cellulitis is a skin infection caused by bacteria  Cellulitis may go away on its own or you may need treatment  Your healthcare provider may draw a [place] around the outside edges of your cellulitis  If your cellulitis spreads, your healthcare provider will see it outside of the [place]  DISCHARGE INSTRUCTIONS:   Call 911 if:   · You have sudden trouble breathing or chest pain  Return to the emergency department if:   · Your wound gets larger and more painful  · You feel a crackling under your skin when you touch it  · You have purple dots or bumps on your skin, or you see bleeding under your skin  · You have new swelling and pain in your legs  · The red, warm, swollen area gets larger  · You see red streaks coming from the infected area  Contact your healthcare provider if:   · You have a fever  · Your fever or pain does not go away or gets worse  · The area does not get smaller after 2 days of antibiotics  · Your skin is flaking or peeling off  · You have questions or concerns about your condition or care  Medicines:   · Antibiotics  help treat the bacterial infection  · NSAIDs , such as ibuprofen, help decrease swelling, pain, and fever  NSAIDs can cause stomach bleeding or kidney problems in certain people  If you take blood thinner medicine, always ask if NSAIDs are safe for you  Always read the medicine label and follow directions  Do not give these medicines to children under 10months of age without direction from your child's healthcare provider  · Acetaminophen  decreases pain and fever  It is available without a doctor's order  Ask how much to take and how often to take it  Follow directions  Read the labels of all other medicines you are using to see if they also contain acetaminophen, or ask your doctor or pharmacist  Acetaminophen can cause liver damage if not taken correctly   Do not use more than 4 grams (4,000 milligrams) total of acetaminophen in one day  · Take your medicine as directed  Contact your healthcare provider if you think your medicine is not helping or if you have side effects  Tell him or her if you are allergic to any medicine  Keep a list of the medicines, vitamins, and herbs you take  Include the amounts, and when and why you take them  Bring the list or the pill bottles to follow-up visits  Carry your medicine list with you in case of an emergency  Self-care:   · Elevate the area above the level of your heart  as often as you can  This will help decrease swelling and pain  Prop the area on pillows or blankets to keep it elevated comfortably  · Clean the area daily until the wound scabs over  Gently wash the area with soap and water  Pat dry  Use dressings as directed  · Place cool or warm, wet cloths on the area as directed  Use clean cloths and clean water  Leave it on the area until the cloth is room temperature  Pat the area dry with a clean, dry cloth  The cloths may help decrease pain  Prevent cellulitis:   · Do not scratch bug bites or areas of injury  You increase your risk for cellulitis by scratching these areas  · Do not share personal items, such as towels, clothing, and razors  · Clean exercise equipment  with germ-killing  before and after you use it  · Wash your hands often  Use soap and water  Wash your hands after you use the bathroom, change a child's diapers, or sneeze  Wash your hands before you prepare or eat food  Use lotion to prevent dry, cracked skin  · Wear pressure stockings as directed  You may be told to wear the stockings if you have peripheral edema  The stockings improve blood flow and decrease swelling  · Treat athlete's foot  This can help prevent the spread of a bacterial skin infection  Follow up with your healthcare provider within 3 days, or as directed:   Your healthcare provider will check if your cellulitis is getting better  You may need different medicine  Write down your questions so you remember to ask them during your visits  © 2017 [employer] Information is for End User's use only and may not be sold, redistributed or otherwise used for commercial purposes  All illustrations and images included in CareNotes® are the copyrighted property of A D A M , Inc  or [employer]  The above information is an  only  It is not intended as medical advice for individual conditions or treatments  Talk to your doctor, nurse or pharmacist before following any medical regimen to see if it is safe and effective for you  Leg Edema   WHAT YOU NEED TO KNOW:   Leg edema is swelling caused by fluid buildup  Your legs may swell if you sit or stand for long periods of time, are pregnant, or are injured  Swelling may also occur if you have heart failure or circulation problems  This means that your heart does not pump blood through your body as it should  DISCHARGE INSTRUCTIONS:   Self-care:   · Elevate your legs:  Raise your legs above the level of your heart as often as you can  This will help decrease swelling and pain  Prop your legs on pillows or blankets to keep them elevated comfortably  · Wear pressure stockings: These tight stockings put pressure on your legs to promote blood flow and prevent blood clots  Wear the stockings during the day  Do not wear them while you sleep  · Apply heat:  Heat helps decrease pain and swelling  Apply heat on the area for 20 to 30 minutes every 2 hours for as many days as directed  · Stay active:  Do not stand or sit for long periods of time  Ask your healthcare provider about the best exercise plan for you  · Eat healthy foods:  Healthy foods include fruits, vegetables, whole-grain breads, low-fat dairy products, beans, lean meats, and fish  Ask if you need to be on a special diet  Limit salt   Salt will make your body hold even more fluid  Follow up with your healthcare provider as directed:  Write down your questions so you remember to ask them during your visits  Contact your healthcare provider if:   · You have a fever or feel more tired than usual     · The veins in your legs look larger than usual  They may look full or bulging  · Your legs itch or feel heavy  · You have red or white areas or sores on your legs  The skin may also appear dimpled or have indentations  · You are gaining weight  · You have trouble moving your ankles  · The swelling does not go away, or other parts of your body swell  · You have questions or concerns about your condition or care  Return to the emergency department if:   · You cannot walk  · You feel faint or confused  · Your skin turns blue or gray  · Your leg feels warm, tender, and painful  It may be swollen and red  · You have chest pain or trouble breathing that is worse when you lie down  · You suddenly feel lightheaded and have trouble breathing  · You have new and sudden chest pain  You may have more pain when you take deep breaths or cough  You may also cough up blood  © 2017 [address] [facility] Information is for End User's use only and may not be sold, redistributed or otherwise used for commercial purposes  All illustrations and images included in CareNotes® are the copyrighted property of A [employer] M [employer] Inc  or [employer] L[employer]  The above information is an  only  It is not intended as medical advice for individual conditions or treatments  Talk to your doctor, nurse or pharmacist before following any medical regimen to see if it is safe and effective for you

## 2018-10-07 NOTE — ED PROVIDER NOTES
History  Chief Complaint   Patient presents with    Leg Swelling     pt reports b/l lower leg swelling x 1 week, reddness started today in right leg, pt seen at [facility] and was told the swelling was because of her liver (+Hep C) and sent her home  Pt reports so painful she has difficulty walking     49 y/o female presents today complaining of bilateral lower extremity edema and mild erythema of the right lower extremity  States the edema is an ongoing problem  Was told its due to her circulation  Swelling is worse at the end of the day being on her feet  Better with elevation  Denies chest pain or shortness of breath  No fever  Has not taken anything for the swelling  History provided by:  Patient  Leg Pain   Location:  Leg  Time since incident:  1 week  Injury: no    Leg location:  L lower leg and R lower leg  Pain details:     Quality:  Aching    Radiates to:  Does not radiate    Severity:  Moderate    Onset quality:  Gradual    Duration:  1 week    Timing:  Constant    Progression:  Unchanged  Chronicity:  Recurrent  Relieved by:  Elevation  Associated symptoms: back pain (chronic) and swelling    Associated symptoms: no decreased ROM, no fever, no numbness and no stiffness    Risk factors: obesity        Prior to Admission Medications   Prescriptions Last Dose Informant Patient Reported? Taking? amLODIPine (NORVASC) 10 mg tablet   Yes No   Sig: Take 10 mg by mouth daily  clotrimazole-betamethasone (LOTRISONE) 1-0 05 % cream   No No   Sig: Apply to affected area 2 times daily prn   famotidine (PEPCID) 20 mg tablet   Yes No   Sig: Take 20 mg by mouth daily   hydrochlorothiazide (HYDRODIURIL) 12 5 mg tablet   No No   Sig: Take 1 tablet by mouth daily for 30 days   lisinopril (ZESTRIL) 10 mg tablet   Yes No   Sig: Take 10 mg by mouth daily     meloxicam (MOBIC) 15 mg tablet   Yes No   Sig: Take 7 5 mg by mouth daily   metoprolol succinate (TOPROL-XL) 25 mg 24 hr tablet   No No   Sig: Take 1 tablet (25 mg total) by mouth 2 (two) times a day   pantoprazole (PROTONIX) 40 mg tablet   No No   Sig: Take 1 tablet (40 mg total) by mouth daily (30 min before breakfast)      Facility-Administered Medications: None       Past Medical History:   Diagnosis Date    Arthritis     Atrial fibrillation (HCC)     Atrial fibrillation (HCC)     Breast lump     GERD (gastroesophageal reflux disease)     H/O transfusion 1987    Hepatitis C     resolved    Hepatitis C     Hyperlipidemia     Hypertension     Irregular heart beat     Pacemaker     Sleep apnea     no cpap       Past Surgical History:   Procedure Laterality Date    CARDIAC PACEMAKER PLACEMENT  2016    AFIB     CHOLECYSTECTOMY      COLONOSCOPY      ELBOW SURGERY      HYSTERECTOMY      HYSTERECTOMY      JOINT REPLACEMENT Left 2015    TKR    KNEE SURGERY Left     RI ESOPHAGOGASTRODUODENOSCOPY TRANSORAL DIAGNOSTIC N/A 5/2/2018    Procedure: ESOPHAGOGASTRODUODENOSCOPY (EGD); Surgeon: Brigida Grayson MD;  [facility]: [facility]; Service: Gastroenterology    RI LARYNGOSCOPY,DIRCT,[facility] N/A 8/10/2018    Procedure: MICRO DIRECT LARYNGOSCOPY , EXCISION OF POLYPS, KTP LASER;  Surgeon: Pawel Worthington MD;  [facility]: [facility];  Service: ENT    REPLACEMENT TOTAL KNEE Left     THROAT SURGERY      polyps removed       Family History   Problem Relation Age of Onset    Arthritis Family     Cancer Family     Diabetes Family     Hypertension Family      I have reviewed and agree with the history as documented  Social History   Substance Use Topics    Smoking status: Current Every Day Smoker     Packs/day: 1 00     Years: 0 00     Types: Cigarettes    Smokeless tobacco: Never Used    Alcohol use No        Review of Systems   Constitutional: Negative for chills and fever  Respiratory: Negative for chest tightness and shortness of breath  Cardiovascular: Positive for leg swelling  Negative for chest pain     Gastrointestinal: Negative for abdominal pain  Musculoskeletal: Positive for back pain (chronic)  Negative for stiffness  Skin: Positive for rash (mild erythema right lower leg)  Negative for pallor  Allergic/Immunologic: Negative for immunocompromised state  Neurological: Negative for dizziness and headaches  Physical Exam  Physical Exam   Constitutional: She is oriented to person, place, and time  She appears well-developed and well-nourished  HENT:   Head: Normocephalic and atraumatic  Mouth/Throat: Uvula is midline, oropharynx is clear and moist and mucous membranes are normal  No tonsillar exudate  Eyes: Pupils are equal, round, and reactive to light  Neck: Normal range of motion  Neck supple  Cardiovascular: Normal rate and regular rhythm  Pulmonary/Chest: Effort normal and breath sounds normal    Abdominal: Soft  Bowel sounds are normal  There is no tenderness  There is no rebound and no guarding  Musculoskeletal: Normal range of motion  She exhibits edema (mild +1 b/l pitting edema lower extremities)  Mild, blanching erythema of the right lower extremity  No wound present  NV intact distally   Neurological: She is alert and oriented to person, place, and time  Patient moving all extremities equally, no focal neuro deficits noted  Skin: Skin is warm and dry  Capillary refill takes less than 2 seconds  Psychiatric: She has a normal mood and affect  Nursing note and vitals reviewed        Vital Signs  ED Triage Vitals   Temperature Pulse Respirations Blood Pressure SpO2   10/07/18 1155 10/07/18 1153 10/07/18 1153 10/07/18 1153 10/07/18 1153   98 4 °F (36 9 °C) 64 20 (!) 222/102 97 %      Temp Source Heart Rate Source Patient Position - Orthostatic VS BP Location FiO2 (%)   10/07/18 1155 10/07/18 1153 10/07/18 1153 10/07/18 1153 --   Oral Monitor Sitting Right arm       Pain Score       10/07/18 1153       8           Vitals:    10/07/18 1153 10/07/18 1333   BP: (!) 222/102 148/75 Pulse: 64 62   Patient Position - Orthostatic VS: Sitting Sitting       Visual Acuity      ED Medications  Medications   oxyCODONE-acetaminophen (PERCOCET) 5-325 mg per tablet 1 tablet (1 tablet Oral Given 10/7/18 1332)       Diagnostic Studies  Results Reviewed     Procedure Component Value Units Date/Time    B-type natriuretic peptide [[id number]]  (Abnormal) Collected:  10/07/18 1308    Lab Status:  Final result Specimen:  Blood from Arm, Right Updated:  10/07/18 1346     NT-proBNP 2,724 (H) pg/mL     Comprehensive metabolic panel [[id number]] Collected:  10/07/18 1308    Lab Status:  Final result Specimen:  Blood from Arm, Right Updated:  10/07/18 1336     Sodium 140 mmol/L      Potassium 4 6 mmol/L      Chloride 105 mmol/L      CO2 30 mmol/L      ANION GAP 5 mmol/L      BUN 17 mg/dL      Creatinine 1 27 mg/dL      Glucose 81 mg/dL      Calcium 8 7 mg/dL      AST 35 U/L      ALT 43 U/L      Alkaline Phosphatase 76 U/L      Total Protein 7 6 g/dL      Albumin 3 7 g/dL      Total Bilirubin 0 30 mg/dL      eGFR 48 ml/min/1 73sq m     Narrative:         National Kidney Disease Education Program recommendations are as follows:  GFR calculation is accurate only with a steady state creatinine  Chronic Kidney disease less than 60 ml/min/1 73 sq  meters  Kidney failure less than 15 ml/min/1 73 sq  meters      Troponin I [[id number]]  (Normal) Collected:  10/07/18 1308    Lab Status:  Final result Specimen:  Blood from Arm, Right Updated:  10/07/18 1333     Troponin I 0 04 ng/mL     CBC and differential [[id number]] Collected:  10/07/18 1308    Lab Status:  Final result Specimen:  Blood from Arm, Right Updated:  10/07/18 1316     WBC 5 30 Thousand/uL      RBC 4 49 Million/uL      Hemoglobin 12 2 g/dL      Hematocrit 38 2 %      MCV 85 fL      MCH 27 2 pg      MCHC 31 9 g/dL      RDW 13 0 %      MPV 11 2 fL      Platelets 469 Thousands/uL      nRBC 0 /100 WBCs      Neutrophils Relative 63 %      Immat GRANS % 1 %      Lymphocytes Relative 25 %      Monocytes Relative 8 %      Eosinophils Relative 3 %      Basophils Relative 0 %      Neutrophils Absolute 3 35 Thousands/µL      Immature Grans Absolute 0 03 Thousand/uL      Lymphocytes Absolute 1 30 Thousands/µL      Monocytes Absolute 0 44 Thousand/µL      Eosinophils Absolute 0 16 Thousand/µL      Basophils Absolute 0 02 Thousands/µL                  XR chest 2 views    (Results Pending)              Procedures  ECG 12 Lead Documentation  Date/Time: 10/7/2018 1:00 PM  Performed by: Amelia Méndez  Authorized by: Amelia Méndez     Indications / Diagnosis:  Leg swelling  ECG reviewed by me, the ED Provider: yes    Patient location:  ED  Previous ECG:     Previous ECG:  Compared to current  Comments:      Atrial fibrillation with a slow ventricular response at 59 beats per minute  Left axis  Right bundle-branch block  Deep T-wave inversion V2 through V6 as well as 1 2 and aVL  No significant change when compared to prior from 09/08/2018  Phone Contacts  ED Phone Contact    ED Course                               MDM  Number of Diagnoses or Management Options  Lower extremity cellulitis: new and requires workup  Lower extremity edema: new and requires workup  Diagnosis management comments: Labs unremarkable  Chest x-ray shows no evidence of fluid overload  Will recommend low-salt diet and compression stockings during the day  Recommend follow-up with PCP as an outpatient         Amount and/or Complexity of Data Reviewed  Clinical lab tests: ordered and reviewed  Tests in the radiology section of CPT®: ordered and reviewed  Tests in the medicine section of CPT®: reviewed and ordered  Review and summarize past medical records: yes  Independent visualization of images, tracings, or specimens: yes    Risk of Complications, Morbidity, and/or Mortality  Presenting problems: high  Diagnostic procedures: high  Management options: high    Patient Progress  Patient progress: stable    CritCare Time    Disposition  Final diagnoses:   Lower extremity edema   Lower extremity cellulitis     Time reflects when diagnosis was documented in both MDM as applicable and the Disposition within this note     Time User Action Codes Description Comment    10/7/2018  1:43 PM Vera Handing Add [R60 0] Lower extremity edema     10/7/2018  1:43 PM Vera Handing Add [Z21 178] Lower extremity cellulitis       ED Disposition     ED Disposition Condition Comment    Discharge  Isidro Inches discharge to home/self care  Condition at discharge: Stable        Follow-up Information     Follow up With Specialties Details Why Contact Info Additional Information    Osvaldo Rosales MD Internal Medicine Schedule an appointment as soon as possible for a visit  [address] [facility] Emergency Department Emergency Medicine  If symptoms worsen [address]  [phone] [facility], [address], [place], [place], [address]          Discharge Medication List as of 10/7/2018  1:45 PM      START taking these medications    Details   cephalexin (KEFLEX) 250 mg capsule Take 1 capsule (250 mg total) by mouth every 6 (six) hours for 7 days, Starting Sun 10/7/2018, Until Sun 10/14/2018, Print         CONTINUE these medications which have NOT CHANGED    Details   amLODIPine (NORVASC) 10 mg tablet Take 10 mg by mouth daily  , Until Discontinued, Historical Med      clotrimazole-betamethasone (LOTRISONE) 1-0 05 % cream Apply to affected area 2 times daily prn, Normal      famotidine (PEPCID) 20 mg tablet Take 20 mg by mouth daily, Until Discontinued, Historical Med      hydrochlorothiazide (HYDRODIURIL) 12 5 mg tablet Take 1 tablet by mouth daily for 30 days, Starting Thu 5/11/2017, Until Mon 8/6/2018, Print      lisinopril (ZESTRIL) 10 mg tablet Take 10 mg by mouth daily  , Until Discontinued, Historical Med      meloxicam (MOBIC) 15 mg tablet Take 7 5 mg by mouth daily, Until Discontinued, Historical Med      metoprolol succinate (TOPROL-XL) 25 mg 24 hr tablet Take 1 tablet (25 mg total) by mouth 2 (two) times a day, Starting Tue 5/15/2018, Normal      pantoprazole (PROTONIX) 40 mg tablet Take 1 tablet (40 mg total) by mouth daily (30 min before breakfast), Starting Fri 6/22/2018, Normal             Outpatient Discharge Orders  [facility]         ED Provider  Electronically Signed by           Kennedy Ferguson DO  10/07/18 1527

## 2018-10-09 LAB
ATRIAL RATE: 535 BPM
P AXIS: 51 DEGREES
QRS AXIS: -21 DEGREES
QRSD INTERVAL: 132 MS
QT INTERVAL: 458 MS
QTC INTERVAL: 453 MS
T WAVE AXIS: 172 DEGREES
VENTRICULAR RATE: 59 BPM

## 2018-10-09 PROCEDURE — 93010 ELECTROCARDIOGRAM REPORT: CPT | Performed by: INTERNAL MEDICINE

## 2018-10-24 NOTE — PROGRESS NOTES
"  Discussion/Summary  Normal device function      Results/Data  Cardiac Device Remote [id number] 08:22AM Daniel Desai     Test Name Result Flag Reference   MISCELLANEOUS COMMENT      CARELINK TRANSMISSION:[id number] VOLTAGE ADEQUATE  AP = 59 %,  = 0 05%  ALL AVAILABLE LEAD PARAMETERS APPEAR WITHIN NORMAL LIMITS  X0ANO SIGNIFICANT HIGH RATE EPISODES  OPTI-VOL WITHIN NORMAL LIMITS  APPROPRIATELY FUNCTIONING ICD  eb   Cardiac Electrophysiology Report      [id number]  pdf   DEVICE TYPE ICD       Cardiac Electrophysiology Report [id number] 08:22AM Daniel Desai     Test Name Result Flag Reference   Cardiac Electrophysiology Report      [id number] pdf     Signatures   Electronically signed by : Christina Bullard, ; Jul 27 2017 12:05PM EST                       (Author)    Electronically signed by : EVA Awan ; Jul 30 2017  7:56PM EST                       (Author)    "
Full range of motion of upper and lower extremities, no joint tenderness/swelling.

## 2018-12-08 ENCOUNTER — HOSPITAL ENCOUNTER (EMERGENCY)
Facility: HOSPITAL | Age: 53
Discharge: HOME | End: 2018-12-08
Attending: EMERGENCY MEDICINE | Admitting: EMERGENCY MEDICINE
Payer: COMMERCIAL

## 2018-12-08 ENCOUNTER — APPOINTMENT (EMERGENCY)
Dept: RADIOLOGY | Facility: HOSPITAL | Age: 53
End: 2018-12-08
Payer: COMMERCIAL

## 2018-12-08 VITALS
DIASTOLIC BLOOD PRESSURE: 101 MMHG | SYSTOLIC BLOOD PRESSURE: 217 MMHG | WEIGHT: 254 LBS | TEMPERATURE: 98.8 F | BODY MASS INDEX: 41 KG/M2 | RESPIRATION RATE: 18 BRPM | HEART RATE: 77 BPM | OXYGEN SATURATION: 97 %

## 2018-12-08 DIAGNOSIS — S93.609A FOOT SPRAIN: Primary | ICD-10-CM

## 2018-12-08 PROCEDURE — 73630 X-RAY EXAM OF FOOT: CPT

## 2018-12-08 PROCEDURE — 99283 EMERGENCY DEPT VISIT LOW MDM: CPT

## 2018-12-08 RX ORDER — NAPROXEN 250 MG/1
250 TABLET ORAL 2 TIMES DAILY WITH MEALS
Qty: 20 TABLET | Refills: 0 | Status: SHIPPED | OUTPATIENT
Start: 2018-12-08 | End: 2018-12-26

## 2018-12-08 NOTE — ED PROVIDER NOTES
History  Chief Complaint   Patient presents with    Foot Pain     States left foot struck [employer] tree stand 2 weeks ago and continues with pain lateral aspect left foot  Patient states she struck in artificial [employer] tree with her left foot approximately 2 weeks ago, striking the dorsal aspect of her foot  Patient has had pain and swelling in the area since but assumed that it would just get better  She states the pain has continued and is worse with ambulation or bearing weight  No other injuries reported  Patient is able to move the toes in the ankle freely  Prior to Admission Medications   Prescriptions Last Dose Informant Patient Reported? Taking? amLODIPine (NORVASC) 10 mg tablet 12/8/2018 at Unknown time  Yes Yes   Sig: Take 10 mg by mouth daily  clotrimazole-betamethasone (LOTRISONE) 1-0 05 % cream 12/8/2018 at Unknown time  No Yes   Sig: Apply to affected area 2 times daily prn   famotidine (PEPCID) 20 mg tablet 12/8/2018 at Unknown time  Yes Yes   Sig: Take 20 mg by mouth daily   hydrochlorothiazide (HYDRODIURIL) 12 5 mg tablet   No No   Sig: Take 1 tablet by mouth daily for 30 days   lisinopril (ZESTRIL) 10 mg tablet 12/8/2018 at Unknown time  Yes Yes   Sig: Take 10 mg by mouth daily     meloxicam (MOBIC) 15 mg tablet More than a month at Unknown time  Yes No   Sig: Take 7 5 mg by mouth daily   metoprolol succinate (TOPROL-XL) 25 mg 24 hr tablet 12/8/2018 at 0800  No Yes   Sig: Take 1 tablet (25 mg total) by mouth 2 (two) times a day   pantoprazole (PROTONIX) 40 mg tablet 12/8/2018 at Unknown time  No Yes   Sig: Take 1 tablet (40 mg total) by mouth daily (30 min before breakfast)   rivaroxaban (XARELTO) 20 mg tablet 12/8/2018 at Unknown time Self Yes Yes   Sig: Take 20 mg by mouth daily with breakfast      Facility-Administered Medications: None       Past Medical History:   Diagnosis Date    Arthritis     Atrial fibrillation (HCC)     Atrial fibrillation ([facility] [address] )     Breast lump     GERD (gastroesophageal reflux disease)     H/O transfusion 1987    Hepatitis C     resolved    Hepatitis C     Hyperlipidemia     Hypertension     Irregular heart beat     Pacemaker     Sleep apnea     no cpap       Past Surgical History:   Procedure Laterality Date    CARDIAC PACEMAKER PLACEMENT  2016    AFIB     CHOLECYSTECTOMY      COLONOSCOPY      ELBOW SURGERY      HYSTERECTOMY      HYSTERECTOMY      JOINT REPLACEMENT Left 2015    TKR    KNEE SURGERY Left     NC ESOPHAGOGASTRODUODENOSCOPY TRANSORAL DIAGNOSTIC N/A 5/2/2018    Procedure: ESOPHAGOGASTRODUODENOSCOPY (EGD); Surgeon: Carlitos Valdivia MD;  [facility]; Service: Gastroenterology    NC LARYNGOSCOPY,DIRCT,OP [facility] N/A 8/10/2018    Procedure: MICRO DIRECT LARYNGOSCOPY , EXCISION OF POLYPS, KTP LASER;  Surgeon: Alphonse Vides MD;  [facility];  Service: ENT    REPLACEMENT TOTAL KNEE Left     THROAT SURGERY      polyps removed       Family History   Problem Relation Age of Onset    Arthritis Family     Cancer Family     Diabetes Family     Hypertension Family      I have reviewed and agree with the history as documented  Social History   Substance Use Topics    Smoking status: Current Every Day Smoker     Packs/day: 1 00     Years: 0 00     Types: Cigarettes    Smokeless tobacco: Never Used    Alcohol use No        Review of Systems   Constitutional: Negative for chills and fever  HENT: Negative for congestion  Respiratory: Negative for shortness of breath  Cardiovascular: Negative for chest pain  Gastrointestinal: Negative for abdominal pain  Musculoskeletal: Positive for arthralgias, gait problem and joint swelling  Skin: Negative for rash  Neurological: Negative for numbness  Hematological: Bruises/bleeds easily  Psychiatric/Behavioral: Negative for confusion  All other systems reviewed and are negative        Physical Exam  Physical Exam   Constitutional: She is oriented to person, place, and time  She appears well-developed and well-nourished  HENT:   Head: Normocephalic and atraumatic  Eyes: Conjunctivae are normal    Neck: Normal range of motion  Cardiovascular: Normal rate, regular rhythm, normal heart sounds and intact distal pulses  Pulmonary/Chest: Effort normal    Abdominal: Soft  There is no tenderness  Musculoskeletal: Normal range of motion  She exhibits tenderness  Patient is tender over the dorsal lateral aspect of the left foot with mild swelling noted  There are good pulses present   Neurological: She is alert and oriented to person, place, and time  Skin: Skin is warm and dry  No bruising is noted   Psychiatric: She has a normal mood and affect  Her behavior is normal    Nursing note and vitals reviewed  Vital Signs  ED Triage Vitals [12/08/18 1516]   Temperature Pulse Respirations Blood Pressure SpO2   98 8 °F (37 1 °C) 77 18 (!) 217/101 97 %      Temp Source Heart Rate Source Patient Position - Orthostatic VS BP Location FiO2 (%)   Tympanic Monitor Lying Right arm --      Pain Score       8           Vitals:    12/08/18 1516   BP: (!) 217/101   Pulse: 77   Patient Position - Orthostatic VS: Lying       Visual Acuity      ED Medications  Medications - No data to display    Diagnostic Studies  Results Reviewed     None                 XR foot 3+ views LEFT    (Results Pending)              Procedures  Procedures       Phone Contacts  ED Phone Contact    ED Course                               MDM  Number of Diagnoses or Management Options  Diagnosis management comments: Will x-ray for possible osseous injury    CritCare Time    Disposition  Final diagnoses:    Foot sprain     Time reflects when diagnosis was documented in both MDM as applicable and the Disposition within this note     Time User Action Codes Description Comment    12/8/2018  4:53 PM Kishore, [address] [facility] sprain       ED Disposition     ED Disposition Condition Comment    Discharge  Baby Moritz discharge to home/self care  Condition at discharge: Stable        Follow-up Information     Follow up With Specialties Details Why Contact Info    Alondra Johnson MD Internal Medicine Schedule an appointment as soon as possible for a visit in 1 day  [phone]            Patient's Medications   Discharge Prescriptions    NAPROXEN (NAPROSYN) 250 MG TABLET    Take 1 tablet (250 mg total) by mouth 2 (two) times a day with meals       Start Date: 12/8/2018 End Date: --       Order Dose: 250 mg       Quantity: 20 tablet    Refills: 0     No discharge procedures on file      ED Provider  Electronically Signed by           Vanessa Mcneal MD  12/08/18 5671

## 2018-12-08 NOTE — DISCHARGE INSTRUCTIONS
Foot Sprain   WHAT YOU NEED TO KNOW:   A foot sprain is caused by a stretched or torn ligament in the foot or toe  Ligaments are tough tissues that connect bones  DISCHARGE INSTRUCTIONS:   Seek care immediately if:   · You have numbness or tingling below the injury, such as in your toes  · The skin on your injured foot is blue or pale  · You have increased pain, even after you take pain medicine  Contact your healthcare provider if:   · You have new weakness in your foot  · You have new or increased swelling in your foot  · You have new or increased stiffness when you move your injured foot  · You have questions or concerns about your condition or care  Medicines:   · NSAIDs , such as ibuprofen, help decrease swelling, pain, and fever  This medicine is available with or without a doctor's order  NSAIDs can cause stomach bleeding or kidney problems in certain people  If you take blood thinner medicine, always ask if NSAIDs are safe for you  Always read the medicine label and follow directions  Do not give these medicines to children under 10months of age without direction from your child's healthcare provider  · Take your medicine as directed  Contact your healthcare provider if you think your medicine is not helping or if you have side effects  Tell him of her if you are allergic to any medicine  Keep a list of the medicines, vitamins, and herbs you take  Include the amounts, and when and why you take them  Bring the list or the pill bottles to follow-up visits  Carry your medicine list with you in case of an emergency  Self-care:   · Rest your foot  Limit movement in your sprained foot for the first 2 to 3 days  You might need crutches to take weight off your injured foot as it heals  Use crutches as directed  · Apply ice  on your foot for 15 to 20 minutes every hour or as directed  Use an ice pack, or put crushed ice in a plastic bag  Cover it with a towel   Ice helps prevent tissue damage and decreases swelling and pain  · Compress your foot  You may need to use tape or an elastic bandage to support your foot if you have a mild sprain  You may need a splint on your foot for support if your sprain is severe  Wear your splint for as many days as directed  · Elevate your foot  above the level of your heart as often as you can  This will help decrease swelling and pain  Prop your foot on pillows or blankets to keep it elevated comfortably  Exercise your foot:  You may be given exercises to improve your strength and to help decrease stiffness  The exercises and physical therapy can help restore strength and increase the range of motion in your foot  Ask your healthcare provider when you can return to your normal activities or play sports  Prevent another foot sprain:   · Warm up and stretch before you exercise  · Do not exercise when you feel pain or are tired  · Wear equipment to protect yourself when you play sports  Follow up with your healthcare provider as directed:  Write down your questions so you remember to ask them during your visits  © 2017 [facility] Information is for End User's use only and may not be sold, redistributed or otherwise used for commercial purposes  All illustrations and images included in CareNotes® are the copyrighted property of A D A M , Inc  or Amrik Laoz  The above information is an  only  It is not intended as medical advice for individual conditions or treatments  Talk to your doctor, nurse or pharmacist before following any medical regimen to see if it is safe and effective for you

## 2018-12-26 ENCOUNTER — APPOINTMENT (EMERGENCY)
Dept: CT IMAGING | Facility: HOSPITAL | Age: 53
DRG: 201 | End: 2018-12-26
Payer: COMMERCIAL

## 2018-12-26 ENCOUNTER — HOSPITAL ENCOUNTER (INPATIENT)
Facility: HOSPITAL | Age: 53
LOS: 1 days | Discharge: HOME | DRG: 201 | End: 2018-12-27
Attending: EMERGENCY MEDICINE | Admitting: INTERNAL MEDICINE
Payer: COMMERCIAL

## 2018-12-26 ENCOUNTER — APPOINTMENT (EMERGENCY)
Dept: RADIOLOGY | Facility: HOSPITAL | Age: 53
DRG: 201 | End: 2018-12-26
Payer: COMMERCIAL

## 2018-12-26 DIAGNOSIS — I48.91 ATRIAL FIBRILLATION WITH RVR (HCC): Chronic | ICD-10-CM

## 2018-12-26 DIAGNOSIS — I48.91 A-FIB (HCC): Primary | ICD-10-CM

## 2018-12-26 DIAGNOSIS — I42.2 HYPERTROPHIC CARDIOMYOPATHY (HCC): ICD-10-CM

## 2018-12-26 DIAGNOSIS — R07.9 CHEST PAIN: ICD-10-CM

## 2018-12-26 DIAGNOSIS — F17.200 NICOTINE DEPENDENCE: Chronic | ICD-10-CM

## 2018-12-26 PROBLEM — R51.9 HEADACHE: Status: ACTIVE | Noted: 2018-12-26

## 2018-12-26 PROBLEM — N17.9 ACUTE RENAL FAILURE SUPERIMPOSED ON STAGE 3 CHRONIC KIDNEY DISEASE (HCC): Status: ACTIVE | Noted: 2018-12-26

## 2018-12-26 PROBLEM — N18.30 ACUTE RENAL FAILURE SUPERIMPOSED ON STAGE 3 CHRONIC KIDNEY DISEASE (HCC): Status: ACTIVE | Noted: 2018-12-26

## 2018-12-26 PROBLEM — I21.4 NSTEMI (NON-ST ELEVATED MYOCARDIAL INFARCTION) (HCC): Status: ACTIVE | Noted: 2018-12-26

## 2018-12-26 LAB
ALBUMIN SERPL BCP-MCNC: 4.4 G/DL (ref 3.5–5)
ALP SERPL-CCNC: 84 U/L (ref 46–116)
ALT SERPL W P-5'-P-CCNC: 44 U/L (ref 12–78)
ANION GAP SERPL CALCULATED.3IONS-SCNC: 12 MMOL/L (ref 4–13)
AST SERPL W P-5'-P-CCNC: 29 U/L (ref 5–45)
BACTERIA UR QL AUTO: ABNORMAL /HPF
BASOPHILS # BLD AUTO: 0.04 THOUSANDS/ΜL (ref 0–0.1)
BASOPHILS NFR BLD AUTO: 1 % (ref 0–1)
BILIRUB SERPL-MCNC: 0.3 MG/DL (ref 0.2–1)
BILIRUB UR QL STRIP: NEGATIVE
BUN SERPL-MCNC: 25 MG/DL (ref 5–25)
CALCIUM SERPL-MCNC: 9.7 MG/DL (ref 8.3–10.1)
CHLORIDE SERPL-SCNC: 103 MMOL/L (ref 100–108)
CLARITY UR: CLEAR
CO2 SERPL-SCNC: 25 MMOL/L (ref 21–32)
COLOR UR: YELLOW
CREAT SERPL-MCNC: 1.68 MG/DL (ref 0.6–1.3)
EOSINOPHIL # BLD AUTO: 0.13 THOUSAND/ΜL (ref 0–0.61)
EOSINOPHIL NFR BLD AUTO: 2 % (ref 0–6)
ERYTHROCYTE [DISTWIDTH] IN BLOOD BY AUTOMATED COUNT: 13.8 % (ref 11.6–15.1)
GFR SERPL CREATININE-BSD FRML MDRD: 34 ML/MIN/1.73SQ M
GLUCOSE SERPL-MCNC: 137 MG/DL (ref 65–140)
GLUCOSE UR STRIP-MCNC: NEGATIVE MG/DL
HCT VFR BLD AUTO: 44.5 % (ref 34.8–46.1)
HGB BLD-MCNC: 14.5 G/DL (ref 11.5–15.4)
HGB UR QL STRIP.AUTO: ABNORMAL
IMM GRANULOCYTES # BLD AUTO: 0.03 THOUSAND/UL (ref 0–0.2)
IMM GRANULOCYTES NFR BLD AUTO: 0 % (ref 0–2)
INR PPP: 1.54 (ref 0.86–1.17)
KETONES UR STRIP-MCNC: NEGATIVE MG/DL
LEUKOCYTE ESTERASE UR QL STRIP: NEGATIVE
LYMPHOCYTES # BLD AUTO: 1.92 THOUSANDS/ΜL (ref 0.6–4.47)
LYMPHOCYTES NFR BLD AUTO: 25 % (ref 14–44)
MAGNESIUM SERPL-MCNC: 1.9 MG/DL (ref 1.6–2.6)
MCH RBC QN AUTO: 27.5 PG (ref 26.8–34.3)
MCHC RBC AUTO-ENTMCNC: 32.6 G/DL (ref 31.4–37.4)
MCV RBC AUTO: 84 FL (ref 82–98)
MONOCYTES # BLD AUTO: 0.44 THOUSAND/ΜL (ref 0.17–1.22)
MONOCYTES NFR BLD AUTO: 6 % (ref 4–12)
NEUTROPHILS # BLD AUTO: 5.22 THOUSANDS/ΜL (ref 1.85–7.62)
NEUTS SEG NFR BLD AUTO: 66 % (ref 43–75)
NITRITE UR QL STRIP: NEGATIVE
NON-SQ EPI CELLS URNS QL MICRO: ABNORMAL /HPF
NRBC BLD AUTO-RTO: 0 /100 WBCS
NT-PROBNP SERPL-MCNC: 5201 PG/ML
PH UR STRIP.AUTO: 7 [PH] (ref 4.5–8)
PLATELET # BLD AUTO: 193 THOUSANDS/UL (ref 149–390)
PMV BLD AUTO: 11.1 FL (ref 8.9–12.7)
POTASSIUM SERPL-SCNC: 4 MMOL/L (ref 3.5–5.3)
PROT SERPL-MCNC: 9 G/DL (ref 6.4–8.2)
PROT UR STRIP-MCNC: ABNORMAL MG/DL
PROTHROMBIN TIME: 18 SECONDS (ref 11.8–14.2)
RBC # BLD AUTO: 5.28 MILLION/UL (ref 3.81–5.12)
RBC #/AREA URNS AUTO: ABNORMAL /HPF
SODIUM SERPL-SCNC: 140 MMOL/L (ref 136–145)
SP GR UR STRIP.AUTO: 1.01 (ref 1–1.03)
TROPONIN I SERPL-MCNC: 0.07 NG/ML
TROPONIN I SERPL-MCNC: 0.21 NG/ML
TROPONIN I SERPL-MCNC: 0.33 NG/ML
TSH SERPL DL<=0.05 MIU/L-ACNC: 1.57 UIU/ML (ref 0.36–3.74)
UROBILINOGEN UR QL STRIP.AUTO: 0.2 E.U./DL
WBC # BLD AUTO: 7.78 THOUSAND/UL (ref 4.31–10.16)
WBC #/AREA URNS AUTO: ABNORMAL /HPF

## 2018-12-26 PROCEDURE — 99223 1ST HOSP IP/OBS HIGH 75: CPT | Performed by: INTERNAL MEDICINE

## 2018-12-26 PROCEDURE — 84484 ASSAY OF TROPONIN QUANT: CPT | Performed by: EMERGENCY MEDICINE

## 2018-12-26 PROCEDURE — 99285 EMERGENCY DEPT VISIT HI MDM: CPT

## 2018-12-26 PROCEDURE — 93005 ELECTROCARDIOGRAM TRACING: CPT

## 2018-12-26 PROCEDURE — 96366 THER/PROPH/DIAG IV INF ADDON: CPT

## 2018-12-26 PROCEDURE — 96374 THER/PROPH/DIAG INJ IV PUSH: CPT

## 2018-12-26 PROCEDURE — 96365 THER/PROPH/DIAG IV INF INIT: CPT

## 2018-12-26 PROCEDURE — 85610 PROTHROMBIN TIME: CPT | Performed by: EMERGENCY MEDICINE

## 2018-12-26 PROCEDURE — 85025 COMPLETE CBC W/AUTO DIFF WBC: CPT | Performed by: EMERGENCY MEDICINE

## 2018-12-26 PROCEDURE — 83735 ASSAY OF MAGNESIUM: CPT | Performed by: EMERGENCY MEDICINE

## 2018-12-26 PROCEDURE — 80053 COMPREHEN METABOLIC PANEL: CPT | Performed by: EMERGENCY MEDICINE

## 2018-12-26 PROCEDURE — 81001 URINALYSIS AUTO W/SCOPE: CPT | Performed by: EMERGENCY MEDICINE

## 2018-12-26 PROCEDURE — 36415 COLL VENOUS BLD VENIPUNCTURE: CPT | Performed by: EMERGENCY MEDICINE

## 2018-12-26 PROCEDURE — 84443 ASSAY THYROID STIM HORMONE: CPT | Performed by: EMERGENCY MEDICINE

## 2018-12-26 PROCEDURE — 71045 X-RAY EXAM CHEST 1 VIEW: CPT

## 2018-12-26 PROCEDURE — 70450 CT HEAD/BRAIN W/O DYE: CPT

## 2018-12-26 PROCEDURE — 83880 ASSAY OF NATRIURETIC PEPTIDE: CPT | Performed by: EMERGENCY MEDICINE

## 2018-12-26 PROCEDURE — 84484 ASSAY OF TROPONIN QUANT: CPT | Performed by: INTERNAL MEDICINE

## 2018-12-26 RX ORDER — ONDANSETRON 2 MG/ML
4 INJECTION INTRAMUSCULAR; INTRAVENOUS EVERY 6 HOURS PRN
Status: DISCONTINUED | OUTPATIENT
Start: 2018-12-26 | End: 2018-12-27 | Stop reason: HOSPADM

## 2018-12-26 RX ORDER — NICOTINE 21 MG/24HR
1 PATCH, TRANSDERMAL 24 HOURS TRANSDERMAL DAILY
Status: DISCONTINUED | OUTPATIENT
Start: 2018-12-27 | End: 2018-12-27 | Stop reason: HOSPADM

## 2018-12-26 RX ORDER — METOPROLOL SUCCINATE 25 MG/1
25 TABLET, EXTENDED RELEASE ORAL 2 TIMES DAILY
Status: DISCONTINUED | OUTPATIENT
Start: 2018-12-26 | End: 2018-12-27

## 2018-12-26 RX ORDER — TRAMADOL HYDROCHLORIDE 50 MG/1
50 TABLET ORAL EVERY 6 HOURS PRN
Status: DISCONTINUED | OUTPATIENT
Start: 2018-12-26 | End: 2018-12-27 | Stop reason: HOSPADM

## 2018-12-26 RX ORDER — TRAMADOL HYDROCHLORIDE 50 MG/1
50 TABLET ORAL ONCE
Status: COMPLETED | OUTPATIENT
Start: 2018-12-26 | End: 2018-12-26

## 2018-12-26 RX ORDER — LISINOPRIL 10 MG/1
10 TABLET ORAL DAILY
Status: DISCONTINUED | OUTPATIENT
Start: 2018-12-27 | End: 2018-12-27 | Stop reason: HOSPADM

## 2018-12-26 RX ORDER — SODIUM CHLORIDE 9 MG/ML
75 INJECTION, SOLUTION INTRAVENOUS CONTINUOUS
Status: DISCONTINUED | OUTPATIENT
Start: 2018-12-26 | End: 2018-12-27 | Stop reason: HOSPADM

## 2018-12-26 RX ORDER — ASPIRIN 81 MG/1
324 TABLET, CHEWABLE ORAL ONCE
Status: COMPLETED | OUTPATIENT
Start: 2018-12-26 | End: 2018-12-26

## 2018-12-26 RX ORDER — TRAMADOL HYDROCHLORIDE 50 MG/1
50 TABLET ORAL EVERY 6 HOURS PRN
COMMUNITY
End: 2020-05-07 | Stop reason: ALTCHOICE

## 2018-12-26 RX ORDER — ACETAMINOPHEN 325 MG/1
650 TABLET ORAL EVERY 6 HOURS PRN
Status: DISCONTINUED | OUTPATIENT
Start: 2018-12-26 | End: 2018-12-27 | Stop reason: HOSPADM

## 2018-12-26 RX ORDER — DILTIAZEM HYDROCHLORIDE 5 MG/ML
10 INJECTION INTRAVENOUS ONCE
Status: COMPLETED | OUTPATIENT
Start: 2018-12-26 | End: 2018-12-26

## 2018-12-26 RX ORDER — AMLODIPINE BESYLATE 10 MG/1
10 TABLET ORAL DAILY
Status: DISCONTINUED | OUTPATIENT
Start: 2018-12-27 | End: 2018-12-27 | Stop reason: HOSPADM

## 2018-12-26 RX ORDER — SENNOSIDES 8.6 MG
1 TABLET ORAL
Status: DISCONTINUED | OUTPATIENT
Start: 2018-12-26 | End: 2018-12-27 | Stop reason: HOSPADM

## 2018-12-26 RX ORDER — FAMOTIDINE 20 MG/1
20 TABLET, FILM COATED ORAL DAILY
Status: DISCONTINUED | OUTPATIENT
Start: 2018-12-27 | End: 2018-12-27 | Stop reason: HOSPADM

## 2018-12-26 RX ADMIN — METOPROLOL SUCCINATE 25 MG: 25 TABLET, EXTENDED RELEASE ORAL at 19:00

## 2018-12-26 RX ADMIN — TRAMADOL HYDROCHLORIDE 50 MG: 50 TABLET, COATED ORAL at 14:11

## 2018-12-26 RX ADMIN — DILTIAZEM HYDROCHLORIDE 10 MG/HR: 5 INJECTION INTRAVENOUS at 19:13

## 2018-12-26 RX ADMIN — SODIUM CHLORIDE 75 ML/HR: 0.9 INJECTION, SOLUTION INTRAVENOUS at 19:09

## 2018-12-26 RX ADMIN — DILTIAZEM HYDROCHLORIDE 2.5 MG/HR: 5 INJECTION INTRAVENOUS at 13:37

## 2018-12-26 RX ADMIN — DILTIAZEM HYDROCHLORIDE 10 MG: 5 INJECTION INTRAVENOUS at 13:22

## 2018-12-26 RX ADMIN — TRAMADOL HYDROCHLORIDE 50 MG: 50 TABLET, COATED ORAL at 21:49

## 2018-12-26 RX ADMIN — SODIUM CHLORIDE 1000 ML: 0.9 INJECTION, SOLUTION INTRAVENOUS at 13:23

## 2018-12-26 RX ADMIN — ACETAMINOPHEN 650 MG: 325 TABLET, FILM COATED ORAL at 19:00

## 2018-12-26 RX ADMIN — ASPIRIN 81 MG 324 MG: 81 TABLET ORAL at 13:20

## 2018-12-27 VITALS
HEIGHT: 67 IN | BODY MASS INDEX: 42.91 KG/M2 | WEIGHT: 273.37 LBS | SYSTOLIC BLOOD PRESSURE: 144 MMHG | DIASTOLIC BLOOD PRESSURE: 91 MMHG | OXYGEN SATURATION: 97 % | RESPIRATION RATE: 18 BRPM | HEART RATE: 60 BPM | TEMPERATURE: 98.1 F

## 2018-12-27 PROBLEM — N17.9 ACUTE RENAL FAILURE SUPERIMPOSED ON STAGE 3 CHRONIC KIDNEY DISEASE (HCC): Status: RESOLVED | Noted: 2018-12-26 | Resolved: 2018-12-27

## 2018-12-27 PROBLEM — N18.30 ACUTE RENAL FAILURE SUPERIMPOSED ON STAGE 3 CHRONIC KIDNEY DISEASE (HCC): Status: RESOLVED | Noted: 2018-12-26 | Resolved: 2018-12-27

## 2018-12-27 PROBLEM — R51.9 HEADACHE: Status: RESOLVED | Noted: 2018-12-26 | Resolved: 2018-12-27

## 2018-12-27 LAB
ANION GAP SERPL CALCULATED.3IONS-SCNC: 8 MMOL/L (ref 4–13)
ATRIAL RATE: 153 BPM
BUN SERPL-MCNC: 22 MG/DL (ref 5–25)
CALCIUM SERPL-MCNC: 8.6 MG/DL (ref 8.3–10.1)
CHLORIDE SERPL-SCNC: 106 MMOL/L (ref 100–108)
CO2 SERPL-SCNC: 25 MMOL/L (ref 21–32)
CREAT SERPL-MCNC: 1.36 MG/DL (ref 0.6–1.3)
GFR SERPL CREATININE-BSD FRML MDRD: 44 ML/MIN/1.73SQ M
GLUCOSE SERPL-MCNC: 90 MG/DL (ref 65–140)
MAGNESIUM SERPL-MCNC: 1.8 MG/DL (ref 1.6–2.6)
POTASSIUM SERPL-SCNC: 4.1 MMOL/L (ref 3.5–5.3)
QRS AXIS: -40 DEGREES
QRSD INTERVAL: 146 MS
QT INTERVAL: 318 MS
QTC INTERVAL: 495 MS
SODIUM SERPL-SCNC: 139 MMOL/L (ref 136–145)
T WAVE AXIS: 119 DEGREES
TROPONIN I SERPL-MCNC: 0.42 NG/ML
TROPONIN I SERPL-MCNC: 0.49 NG/ML
VENTRICULAR RATE: 146 BPM

## 2018-12-27 PROCEDURE — 83735 ASSAY OF MAGNESIUM: CPT | Performed by: INTERNAL MEDICINE

## 2018-12-27 PROCEDURE — 93010 ELECTROCARDIOGRAM REPORT: CPT | Performed by: INTERNAL MEDICINE

## 2018-12-27 PROCEDURE — 99239 HOSP IP/OBS DSCHRG MGMT >30: CPT | Performed by: PHYSICIAN ASSISTANT

## 2018-12-27 PROCEDURE — 99254 IP/OBS CNSLTJ NEW/EST MOD 60: CPT | Performed by: INTERNAL MEDICINE

## 2018-12-27 PROCEDURE — 4B02XTZ MEASUREMENT OF CARDIAC DEFIBRILLATOR, EXTERNAL APPROACH: ICD-10-PCS | Performed by: NURSE PRACTITIONER

## 2018-12-27 PROCEDURE — 80048 BASIC METABOLIC PNL TOTAL CA: CPT | Performed by: INTERNAL MEDICINE

## 2018-12-27 PROCEDURE — 84484 ASSAY OF TROPONIN QUANT: CPT | Performed by: INTERNAL MEDICINE

## 2018-12-27 RX ORDER — NICOTINE 21 MG/24HR
1 PATCH, TRANSDERMAL 24 HOURS TRANSDERMAL DAILY
Qty: 28 PATCH | Refills: 0 | Status: SHIPPED | OUTPATIENT
Start: 2018-12-28 | End: 2019-01-07 | Stop reason: SINTOL

## 2018-12-27 RX ORDER — METOPROLOL SUCCINATE 50 MG/1
50 TABLET, EXTENDED RELEASE ORAL EVERY 12 HOURS SCHEDULED
Status: DISCONTINUED | OUTPATIENT
Start: 2018-12-27 | End: 2018-12-27 | Stop reason: HOSPADM

## 2018-12-27 RX ORDER — METOPROLOL SUCCINATE 50 MG/1
50 TABLET, EXTENDED RELEASE ORAL EVERY 12 HOURS SCHEDULED
Qty: 60 TABLET | Refills: 0 | Status: SHIPPED | OUTPATIENT
Start: 2018-12-27 | End: 2019-09-25 | Stop reason: SDUPTHER

## 2018-12-27 RX ADMIN — TRAMADOL HYDROCHLORIDE 50 MG: 50 TABLET, COATED ORAL at 05:42

## 2018-12-27 RX ADMIN — SODIUM CHLORIDE 75 ML/HR: 0.9 INJECTION, SOLUTION INTRAVENOUS at 07:41

## 2018-12-27 RX ADMIN — AMLODIPINE BESYLATE 10 MG: 10 TABLET ORAL at 08:18

## 2018-12-27 RX ADMIN — FAMOTIDINE 20 MG: 20 TABLET ORAL at 08:18

## 2018-12-27 RX ADMIN — RIVAROXABAN 20 MG: 20 TABLET, FILM COATED ORAL at 07:40

## 2018-12-27 RX ADMIN — METOPROLOL SUCCINATE 25 MG: 25 TABLET, EXTENDED RELEASE ORAL at 08:18

## 2018-12-27 RX ADMIN — LISINOPRIL 10 MG: 10 TABLET ORAL at 08:18

## 2018-12-31 DIAGNOSIS — T50.8X5A ALLERGIC REACTION TO CONTRAST MATERIAL, INITIAL ENCOUNTER: Primary | ICD-10-CM

## 2019-01-02 RX ORDER — DIPHENHYDRAMINE HCL 25 MG
TABLET ORAL
Qty: 2 TABLET | Refills: 0 | Status: SHIPPED | OUTPATIENT
Start: 2019-01-02 | End: 2019-01-07 | Stop reason: HOSPADM

## 2019-01-02 RX ORDER — FAMOTIDINE 20 MG/1
TABLET, FILM COATED ORAL
Qty: 2 TABLET | Refills: 0 | Status: SHIPPED | OUTPATIENT
Start: 2019-01-02 | End: 2019-01-07 | Stop reason: HOSPADM

## 2019-01-02 RX ORDER — PREDNISONE 20 MG/1
TABLET ORAL
Qty: 6 TABLET | Refills: 0 | Status: SHIPPED | OUTPATIENT
Start: 2019-01-02 | End: 2019-01-07 | Stop reason: HOSPADM

## 2019-01-04 RX ORDER — ASPIRIN 81 MG/1
324 TABLET, CHEWABLE ORAL ONCE
Status: CANCELLED | OUTPATIENT
Start: 2019-01-04 | End: 2019-01-04

## 2019-01-04 RX ORDER — SODIUM CHLORIDE 9 MG/ML
125 INJECTION, SOLUTION INTRAVENOUS CONTINUOUS
Status: CANCELLED | OUTPATIENT
Start: 2019-01-04

## 2019-01-07 ENCOUNTER — HOSPITAL ENCOUNTER (OUTPATIENT)
Dept: NON INVASIVE DIAGNOSTICS | Facility: HOSPITAL | Age: 54
Discharge: HOME | End: 2019-01-07
Payer: COMMERCIAL

## 2019-01-07 VITALS
HEART RATE: 60 BPM | OXYGEN SATURATION: 95 % | SYSTOLIC BLOOD PRESSURE: 134 MMHG | RESPIRATION RATE: 16 BRPM | HEIGHT: 67 IN | TEMPERATURE: 96.7 F | WEIGHT: 270 LBS | DIASTOLIC BLOOD PRESSURE: 63 MMHG | BODY MASS INDEX: 42.38 KG/M2

## 2019-01-07 DIAGNOSIS — I48.91 ATRIAL FIBRILLATION WITH RVR (HCC): Chronic | ICD-10-CM

## 2019-01-07 DIAGNOSIS — I42.2 HYPERTROPHIC CARDIOMYOPATHY (HCC): ICD-10-CM

## 2019-01-07 LAB
ANION GAP SERPL CALCULATED.3IONS-SCNC: 8 MMOL/L (ref 4–13)
BUN SERPL-MCNC: 28 MG/DL (ref 5–25)
CALCIUM SERPL-MCNC: 9.4 MG/DL (ref 8.3–10.1)
CHLORIDE SERPL-SCNC: 104 MMOL/L (ref 100–108)
CHOLEST SERPL-MCNC: 140 MG/DL (ref 50–200)
CO2 SERPL-SCNC: 26 MMOL/L (ref 21–32)
CREAT SERPL-MCNC: 1.54 MG/DL (ref 0.6–1.3)
ERYTHROCYTE [DISTWIDTH] IN BLOOD BY AUTOMATED COUNT: 13.8 % (ref 11.6–15.1)
GFR SERPL CREATININE-BSD FRML MDRD: 38 ML/MIN/1.73SQ M
GLUCOSE P FAST SERPL-MCNC: 120 MG/DL (ref 65–99)
GLUCOSE SERPL-MCNC: 120 MG/DL (ref 65–140)
HCT VFR BLD AUTO: 40 % (ref 34.8–46.1)
HDLC SERPL-MCNC: 50 MG/DL (ref 40–60)
HGB BLD-MCNC: 12.8 G/DL (ref 11.5–15.4)
INR PPP: 1.02 (ref 0.86–1.17)
LDLC SERPL CALC-MCNC: 80 MG/DL (ref 0–100)
MAGNESIUM SERPL-MCNC: 2.1 MG/DL (ref 1.6–2.6)
MCH RBC QN AUTO: 26.6 PG (ref 26.8–34.3)
MCHC RBC AUTO-ENTMCNC: 32 G/DL (ref 31.4–37.4)
MCV RBC AUTO: 83 FL (ref 82–98)
NONHDLC SERPL-MCNC: 90 MG/DL
PLATELET # BLD AUTO: 192 THOUSANDS/UL (ref 149–390)
PMV BLD AUTO: 10.8 FL (ref 8.9–12.7)
POTASSIUM SERPL-SCNC: 5.1 MMOL/L (ref 3.5–5.3)
PROTHROMBIN TIME: 13.1 SECONDS (ref 11.8–14.2)
RBC # BLD AUTO: 4.81 MILLION/UL (ref 3.81–5.12)
SODIUM SERPL-SCNC: 138 MMOL/L (ref 136–145)
TRIGL SERPL-MCNC: 49 MG/DL
WBC # BLD AUTO: 10.02 THOUSAND/UL (ref 4.31–10.16)

## 2019-01-07 PROCEDURE — 80061 LIPID PANEL: CPT | Performed by: INTERNAL MEDICINE

## 2019-01-07 PROCEDURE — C1894 INTRO/SHEATH, NON-LASER: HCPCS | Performed by: NURSE PRACTITIONER

## 2019-01-07 PROCEDURE — 85027 COMPLETE CBC AUTOMATED: CPT | Performed by: INTERNAL MEDICINE

## 2019-01-07 PROCEDURE — C1769 GUIDE WIRE: HCPCS | Performed by: NURSE PRACTITIONER

## 2019-01-07 PROCEDURE — 99152 MOD SED SAME PHYS/QHP 5/>YRS: CPT | Performed by: INTERNAL MEDICINE

## 2019-01-07 PROCEDURE — 83735 ASSAY OF MAGNESIUM: CPT | Performed by: INTERNAL MEDICINE

## 2019-01-07 PROCEDURE — 85610 PROTHROMBIN TIME: CPT | Performed by: INTERNAL MEDICINE

## 2019-01-07 PROCEDURE — 99152 MOD SED SAME PHYS/QHP 5/>YRS: CPT | Performed by: NURSE PRACTITIONER

## 2019-01-07 PROCEDURE — 93458 L HRT ARTERY/VENTRICLE ANGIO: CPT | Performed by: INTERNAL MEDICINE

## 2019-01-07 PROCEDURE — 93458 L HRT ARTERY/VENTRICLE ANGIO: CPT | Performed by: NURSE PRACTITIONER

## 2019-01-07 PROCEDURE — 99153 MOD SED SAME PHYS/QHP EA: CPT | Performed by: NURSE PRACTITIONER

## 2019-01-07 PROCEDURE — 80048 BASIC METABOLIC PNL TOTAL CA: CPT | Performed by: INTERNAL MEDICINE

## 2019-01-07 RX ORDER — HEPARIN SODIUM 1000 [USP'U]/ML
INJECTION, SOLUTION INTRAVENOUS; SUBCUTANEOUS CODE/TRAUMA/SEDATION MEDICATION
Status: COMPLETED | OUTPATIENT
Start: 2019-01-07 | End: 2019-01-07

## 2019-01-07 RX ORDER — ASPIRIN 81 MG/1
324 TABLET, CHEWABLE ORAL ONCE
Status: COMPLETED | OUTPATIENT
Start: 2019-01-07 | End: 2019-01-07

## 2019-01-07 RX ORDER — NITROGLYCERIN 20 MG/100ML
INJECTION INTRAVENOUS CODE/TRAUMA/SEDATION MEDICATION
Status: COMPLETED | OUTPATIENT
Start: 2019-01-07 | End: 2019-01-07

## 2019-01-07 RX ORDER — SODIUM CHLORIDE 9 MG/ML
125 INJECTION, SOLUTION INTRAVENOUS CONTINUOUS
Status: DISCONTINUED | OUTPATIENT
Start: 2019-01-07 | End: 2019-01-08 | Stop reason: HOSPADM

## 2019-01-07 RX ORDER — SODIUM CHLORIDE 9 MG/ML
125 INJECTION, SOLUTION INTRAVENOUS CONTINUOUS
Status: DISPENSED | OUTPATIENT
Start: 2019-01-07 | End: 2019-01-07

## 2019-01-07 RX ORDER — FENTANYL CITRATE 50 UG/ML
INJECTION, SOLUTION INTRAMUSCULAR; INTRAVENOUS CODE/TRAUMA/SEDATION MEDICATION
Status: COMPLETED | OUTPATIENT
Start: 2019-01-07 | End: 2019-01-07

## 2019-01-07 RX ORDER — VERAPAMIL HYDROCHLORIDE 2.5 MG/ML
INJECTION, SOLUTION INTRAVENOUS CODE/TRAUMA/SEDATION MEDICATION
Status: COMPLETED | OUTPATIENT
Start: 2019-01-07 | End: 2019-01-07

## 2019-01-07 RX ORDER — LIDOCAINE HYDROCHLORIDE 10 MG/ML
INJECTION, SOLUTION INFILTRATION; PERINEURAL CODE/TRAUMA/SEDATION MEDICATION
Status: COMPLETED | OUTPATIENT
Start: 2019-01-07 | End: 2019-01-07

## 2019-01-07 RX ORDER — MIDAZOLAM HYDROCHLORIDE 1 MG/ML
INJECTION INTRAMUSCULAR; INTRAVENOUS CODE/TRAUMA/SEDATION MEDICATION
Status: COMPLETED | OUTPATIENT
Start: 2019-01-07 | End: 2019-01-07

## 2019-01-07 RX ADMIN — IOHEXOL 60 ML: 350 INJECTION, SOLUTION INTRAVENOUS at 08:48

## 2019-01-07 RX ADMIN — MIDAZOLAM HYDROCHLORIDE 1 MG: 1 INJECTION, SOLUTION INTRAMUSCULAR; INTRAVENOUS at 08:39

## 2019-01-07 RX ADMIN — VERAPAMIL HYDROCHLORIDE 2.5 MG: 2.5 INJECTION, SOLUTION INTRAVENOUS at 08:42

## 2019-01-07 RX ADMIN — FENTANYL CITRATE 50 MCG: 50 INJECTION INTRAMUSCULAR; INTRAVENOUS at 08:34

## 2019-01-07 RX ADMIN — NITROGLYCERIN 200 MCG: 20 INJECTION INTRAVENOUS at 08:42

## 2019-01-07 RX ADMIN — MIDAZOLAM HYDROCHLORIDE 2 MG: 1 INJECTION, SOLUTION INTRAMUSCULAR; INTRAVENOUS at 08:35

## 2019-01-07 RX ADMIN — SODIUM CHLORIDE 125 ML/HR: 0.9 INJECTION, SOLUTION INTRAVENOUS at 07:30

## 2019-01-07 RX ADMIN — HEPARIN SODIUM 4000 UNITS: 1000 INJECTION INTRAVENOUS; SUBCUTANEOUS at 08:42

## 2019-01-07 RX ADMIN — LIDOCAINE HYDROCHLORIDE ANHYDROUS 1 ML: 10 INJECTION, SOLUTION INFILTRATION at 08:40

## 2019-01-07 RX ADMIN — FENTANYL CITRATE 25 MCG: 50 INJECTION INTRAMUSCULAR; INTRAVENOUS at 08:38

## 2019-01-07 RX ADMIN — ASPIRIN 81 MG 324 MG: 81 TABLET ORAL at 07:34

## 2019-01-07 NOTE — DISCHARGE INSTRUCTIONS
After Radial Heart Catheterization   WHAT YOU NEED TO KNOW:   What will happen after a radial heart catheterization? · You will be attached to a heart monitor until you are fully awake  A heart monitor is an EKG that stays on continuously to record your heart's electrical activity  Healthcare providers will monitor your vital signs and pulses in your arm  They will frequently check your pressure bandage for bleeding or swelling  · You may have a band wrapped tightly around your wrist  The band puts pressure on your wound and helps prevent bleeding  A healthcare provider can put air into the band or remove air from the band  A healthcare provider will gradually remove air from the band and decrease pressure on your wrist  The band may be removed in 2 hours or when your wound stops bleeding  · You will need to keep your wrist straight for 2 to 4 hours  Do not  push or pull with your arm  Arm movements can cause serious bleeding  After you are monitored for several hours, you may go home or may need to stay in the hospital overnight  What do I need to know before I go home? · Care for your wound as directed  Remove the pressure bandage in 24 hours or as directed  Mild bruising is normal and expected  A small bandage can be placed on your wound after you remove the pressure bandage  Do not put powders, lotions, or creams on your wound  They may cause your wound to get infected  Monitor your wound every day for signs of infection, such as redness, swelling, or pus  · Shower the day after your procedure or as directed  Remove your pressure bandage before you shower  Do not take baths or go in hot tubs or pools  Carefully wash the wound with soap and water  Pat the area dry  A small bandage can be placed on your wound after you shower  · Apply firm, steady pressure to your wound if it bleeds  Apply pressure with a clean gauze or towel for 5 to 10 minutes   Call 911 if bleeding becomes heavy or does not stop  · Drink liquids as directed  Liquids will help flush the contrast liquid from your body  Ask how much liquid to drink each day and which liquids are best for you  · Do not lift anything heavier than 5 pounds until directed by your healthcare provider  Heavy lifting can put stress on your wound and cause bleeding  Do not push or pull with the arm that was used for the procedure  Do not do vigorous activity for at least 48 hours  Vigorous activity may cause bleeding from your wound  Rest and do quiet activities  Take short walks around the house to prevent a blood clot  Ask your healthcare provider when you can return to your normal activities  · Do not drive or return to work until your healthcare provider says it is okay  Your healthcare provider may tell you to wait 48 hours before you drive to decrease your risk for bleeding  You may not be able to return to work for at least 2 days after your procedure if your job involves heavy lifting  What medicines may I need? You may need any of the following:  · Blood thinners    help prevent blood clots  Examples of blood thinners include heparin and warfarin  Clots can cause strokes, heart attacks, and death  The following are general safety guidelines to follow while you are taking a blood thinner:    ¨ Watch for bleeding and bruising while you take blood thinners  Watch for bleeding from your gums or nose  Watch for blood in your urine and bowel movements  Use a soft washcloth on your skin, and a soft toothbrush to brush your teeth  This can keep your skin and gums from bleeding  If you shave, use an electric shaver  Do not play contact sports  ¨ Tell your dentist and other healthcare providers that you take anticoagulants  Wear a bracelet or necklace that says you take this medicine  ¨ Do not start or stop any medicines unless your healthcare provider tells you to  Many medicines cannot be used with blood thinners       ¨ Tell your healthcare provider right away if you forget to take the medicine, or if you take too much  ¨ Warfarin  is a blood thinner that you may need to take  The following are things you should be aware of if you take warfarin  § Foods and medicines can affect the amount of warfarin in your blood  Do not make major changes to your diet while you take warfarin  Warfarin works best when you eat about the same amount of vitamin K every day  Vitamin K is found in green leafy vegetables and certain other foods  Ask for more information about what to eat when you are taking warfarin  § You will need to see your healthcare provider for follow-up visits when you are on warfarin  You will need regular blood tests  These tests are used to decide how much medicine you need  · Acetaminophen  helps decrease pain and fever  This medicine is available without a doctor's order  Ask how much medicine is safe to take, and how often to take it  Acetaminophen can cause liver damage if not taken correctly  · Take your medicine as directed  Contact your healthcare provider if you think your medicine is not helping or if you have side effects  Tell him or her if you are allergic to any medicine  Keep a list of the medicines, vitamins, and herbs you take  Include the amounts, and when and why you take them  Bring the list or the pill bottles to follow-up visits  Carry your medicine list with you in case of an emergency    Call 911 for any of the following:   · You have any of the following signs of a heart attack:      ¨ Squeezing, pressure, or pain in your chest that lasts longer than 5 minutes or returns    ¨ Discomfort or pain in your back, neck, jaw, stomach, or arm     ¨ Trouble breathing    ¨ Nausea or vomiting    ¨ Lightheadedness or a sudden cold sweat, especially with chest pain or trouble breathing    · You have any of the following signs of a stroke:      ¨ Numbness or drooping on one side of your face     ¨ Weakness in an arm or leg    ¨ Confusion or difficulty speaking    ¨ Dizziness, a severe headache, or vision loss    · You feel lightheaded, short of breath, and have chest pain  · You cough up blood  · You have trouble breathing  · You cannot stop the bleeding from your wound even after you hold firm pressure for 10 minutes  When should I seek immediate care? · Blood soaks through your bandage  · Your stitches come apart  · Your hand or arm feels numb, cool, or looks pale  · Your wound gets swollen quickly  When should I contact my healthcare provider? · You have a fever or chills  · Your wound is red, swollen, or draining pus  · Your wound looks more bruised or you have new bruising on the side of your wrist      · You have nausea or are vomiting  · Your skin is itchy, swollen, or you have a rash  · You have questions or concerns about your condition or care  CARE AGREEMENT:   You have the right to help plan your care  Learn about your health condition and how it may be treated  Discuss treatment options with your caregivers to decide what care you want to receive  You always have the right to refuse treatment  The above information is an  only  It is not intended as medical advice for individual conditions or treatments  Talk to your doctor, nurse or pharmacist before following any medical regimen to see if it is safe and effective for you  © 2017 [employer] Information is for End User's use only and may not be sold, redistributed or otherwise used for commercial purposes  All illustrations and images included in CareNotes® are the copyrighted property of [employer]  or [employer]

## 2019-01-07 NOTE — H&P (VIEW-ONLY)
Consultation - Cardiology Team One  Kenia Laird 48 y o  female MRN: [id number]  Unit/Bed#: -01 Encounter: [id number]    Inpatient consult to Cardiology  Consult performed by: Mabel Bynum  Consult ordered by: Armani Vazquez          Physician Requesting Consult: Brigid Rodriguez MD     Reason for Consult / Principal Problem: atrial fibrillation      Assessment/ Plan:    Paroxysmal atrial fibrillation/flutter:  A known history with prior ablation however has had recurrent AFib  No clear precipitating factor, maybe just mild dehydration increased caffeine intake and stress  Patient's symptoms resolved completely once back in sinus rhythm and she feels back to baseline  Has been compliant with Xarelto  Recommend increasing metoprolol succinate to 50 mg b i d  Device interrogation was reviewed without any device firings    Elevated troponin:  This likely represents NSTEMI type 2 given her presentation of atrial fibrillation with significant RVR in the setting of hypertrophic cardiomyopathy  Patient did undergo a nuclear stress test in 2016 that revealed a small to moderate reversible perfusion defect of the mid and apical anterior walls; at that time she refused cardiac catheterization having had one 1 year prior [facility]; I do not have records of this catheterization   - given her multiple risk factors for coronary artery disease and previous abnormal stress test we do recommend her having a cardiac catheterization to evaluate for coronary artery disease  This was discussed with the patient and she adamantly refuses to have the procedure done as an inpatient; will arrange as an outpatient  She is planning to have her hip replaced in the near future and is aware that this procedure will have to be done prior to any cardiac clearance  She was instructed to return to the hospital if she develops any further chest discomfort      History of ventricular tachycardia:  Seen on loop recorder status post AICD implant  No device firings; continue beta blocker  Hypertrophic cardiomyopathy without obstruction:  Continue outpatient follow-up  Tobacco abuse:  Advised cessation    Hypertension:  Blood pressure was very high on admission, improved quickly  Currently 144/91, her beta-blocker dose will be increased  Follow-up outpatient    History of Present Illness   HPI: Nacho Ibrahim is a 48y o  year old female who has a history of paroxysmal atrial fibrillation/flutter on Xarelto, hypertrophic cardiomyopathy, ventricular tachycardia status post AICD, tobacco abuse, HTN, HLD  She follows with cardiologist Dr Reji Brownlee  Patient presented to the emergency department with complaints of palpitations and chest tightness  States she was in her normal state of health until yesterday morning well cooking she developed a sudden onset of palpitations chest tightness dizziness and lightheadedness consistent with her symptoms her prior episodes of atr fibrillation  She thought she may have gotten shocks from her defibrillator so she came to the emergency department  On presentation EKG showed atrial fibrillation with RVR  She was hypertensive with a blood pressure of 210/116, afebrile with SpO2 of 98% on room air  Chest x-ray NAD  Labs revealed:   Creatinine 1 68 (above baseline parenthesis), K 4 0, Mag 1 9  She did have an elevated troponin of 0 07 on admission  Patient was started on intravenous diltiazem and converted to sinus rhythm at approximately 8:00 p m  Since being in sinus rhythm her symptoms have resolved completely  She feels back to baseline and is anxious to be discharged  She has a known history of paroxysmal atrial fibrillation/flutter, undergoing previous ablation at [facility]   She does continue to have recurrent PAF; last episode reported to be 5 months ago  She is on metoprolol succinate 25 mg b i d  Anticoagulated was elderly frail told 20 mg daily    She reports full compliance with his medications  She does admit to having 2 extra cups of coffee yesterday as well as increased stress with her daughter fighting  She denies any recent exertional chest discomfort or shortness of breath  No lower extremity edema, orthopnea or weight gain  Other cardiac history includes hypertrophic cardiomyopathy without obstruction; most recent echo 2016 with EF of 60-65% with severe concentric hypertrophy  She underwent loop implant a few years ago which showed ventricular tachycardia; and underwent AICD placement ([employer]) in 2016  Of note her mother has suffered a sudden cardiac arrest      She did have a nuclear ST 2016 that revealed a perfusion defect of anterior wall, cath was recommended at the time but she refused stating she had a normal cath the year prior at [facility]  Her VT and ICD implant occurred in 2016  She continues to smoker cigarettes  EKG reviewed personally:  Atrial fibrillation with RVR    Telemetry reviewed personally:   Atrial fibrillation with RVR; conversion to SR 12/    Review of Systems   Constitution: Negative for decreased appetite, fever and weakness  Cardiovascular: Negative for chest pain, dyspnea on exertion, leg swelling, orthopnea, palpitations and syncope  Respiratory: Negative for cough, shortness of breath and wheezing  Gastrointestinal: Negative for abdominal pain, nausea and vomiting  Genitourinary: Negative for dysuria  Neurological: Negative for dizziness and light-headedness  Psychiatric/Behavioral: Negative for altered mental status  All other systems reviewed and are negative       Historical Information   Past Medical History:   Diagnosis Date    Arthritis     Atrial fibrillation ([address] )     Atrial fibrillation (HCC)     Breast lump     GERD (gastroesophageal reflux disease)     H/O transfusion 1987    Hepatitis C     resolved    Hepatitis C     Hyperlipidemia     Hypertension     Irregular heart beat  Pacemaker     Sleep apnea     no cpap     Past Surgical History:   Procedure Laterality Date    CARDIAC PACEMAKER PLACEMENT  2016    AFIB     CHOLECYSTECTOMY      COLONOSCOPY      ELBOW SURGERY      HYSTERECTOMY      HYSTERECTOMY      JOINT REPLACEMENT Left 2015    TKR    KNEE SURGERY Left     VT ESOPHAGOGASTRODUODENOSCOPY TRANSORAL DIAGNOSTIC N/A 5/2/2018    Procedure: ESOPHAGOGASTRODUODENOSCOPY (EGD); Surgeon: Kristie Sheffield MD;  Location: [facility];   Service: Gastroenterology    VT LARYNGOSCOPY,DIRCT,OP [facility] TUM N/A 8/10/2018    Procedure: MICRO DIRECT LARYNGOSCOPY , EXCISION OF POLYPS, KTP LASER;  Surgeon: Jasper Abarca MD;  [facility];  Service: ENT    REPLACEMENT TOTAL KNEE Left     THROAT SURGERY      polyps removed     History   Alcohol Use No     History   Drug Use No     History   Smoking Status    Current Every Day Smoker    Packs/day: 1 00    Years: 0 00    Types: Cigarettes   Smokeless Tobacco    Never Used     Family History:   Family History   Problem Relation Age of Onset    Arthritis Family     Cancer Family     Diabetes Family     Hypertension Family        Meds/Allergies   current meds:   Current Facility-Administered Medications   Medication Dose Route Frequency    acetaminophen (TYLENOL) tablet 650 mg  650 mg Oral Q6H PRN    amLODIPine (NORVASC) tablet 10 mg  10 mg Oral Daily    famotidine (PEPCID) tablet 20 mg  20 mg Oral Daily    lisinopril (ZESTRIL) tablet 10 mg  10 mg Oral Daily    metoprolol succinate (TOPROL-XL) 24 hr tablet 50 mg  50 mg Oral Q12H [address]    nicotine (NICODERM CQ) 21 mg/24 hr TD 24 hr patch 1 patch  1 patch Transdermal Daily    ondansetron (ZOFRAN) injection 4 mg  4 mg Intravenous Q6H PRN    rivaroxaban (XARELTO) tablet 20 mg  20 mg Oral Daily With Breakfast    senna (SENOKOT) tablet 8 6 mg  1 tablet Oral HS PRN    sodium chloride 0 9 % infusion  75 mL/hr Intravenous Continuous    traMADol (ULTRAM) tablet 50 mg  50 mg Oral Q6H PRN       sodium chloride 75 mL/hr Last Rate: 75 mL/hr (12/27/18 0741)       Allergies   Allergen Reactions    Iodinated Diagnostic Agents Hives    Tape  [Medical Tape] Hives     Objective   Vitals: Blood pressure 144/91, pulse 60, temperature 98 1 °F (36 7 °C), temperature source Oral, resp  rate 18, height 5' 7" (1 702 m), weight 124 kg (273 lb 5 9 oz), SpO2 97 %, not currently breastfeeding ,     Body mass index is 42 82 kg/m²  ,     Systolic (16DCH), DQH:528 , Min:126 , HWC:091     Diastolic (58KGT), QGB:85, Min:54, Max:116      Intake/Output Summary (Last 24 hours) at 12/27/18 1105  Last data filed at 12/27/18 0741   Gross per 24 hour   Intake             2000 ml   Output                0 ml   Net             2000 ml     Weight (last 2 days)     Date/Time   Weight    12/26/18 1627  124 (273 37)    12/26/18 1448  123 (270 5)            Invasive Devices     Peripheral Intravenous Line            Peripheral IV 12/26/18 Left Antecubital less than 1 day    Peripheral IV 12/26/18 Right Hand less than 1 day              Physical Exam   Constitutional: She is oriented to person, place, and time  No distress  Pt sitting up in bed in NAD, alert   HENT:   Head: Normocephalic and atraumatic  Cardiovascular: Normal rate, regular rhythm, S1 normal and S2 normal     No murmur heard  No LE edema   Pulmonary/Chest: Effort normal and breath sounds normal  No respiratory distress  She has no wheezes  She has no rales  Musculoskeletal: She exhibits no edema  Neurological: She is alert and oriented to person, place, and time  Skin: Skin is warm and dry  She is not diaphoretic  Psychiatric: She has a normal mood and affect  Her behavior is normal    Nursing note and vitals reviewed      LABORATORY RESULTS:    Results from last 7 days  Lab Units 12/27/18  0759 12/27/18  0428 12/26/18  2203   TROPONIN I ng/mL 0 42* 0 49* 0 33*     CBC with diff:   Results from last 7 days  Lab Units 12/26/18  1318   WBC Thousand/uL 7 78   HEMOGLOBIN g/dL 14 5   HEMATOCRIT % 44 5   MCV fL 84   PLATELETS Thousands/uL 193   MCH pg 27 5   MCHC g/dL 32 6   RDW % 13 8   MPV fL 11 1   NRBC AUTO /100 WBCs 0     CMP:  Results from last 7 days  Lab Units 18  0428 18  1318   POTASSIUM mmol/L 4 1 4 0   CHLORIDE mmol/L 106 103   CO2 mmol/L 25 25   BUN mg/dL 22 25   CREATININE mg/dL 1 36* 1 68*   CALCIUM mg/dL 8 6 9 7   AST U/L  --  29   ALT U/L  --  44   ALK PHOS U/L  --  84   EGFR ml/min/1 73sq m 44 34     BMP:  Results from last 7 days  Lab Units 18  0428 18  1318   POTASSIUM mmol/L 4 1 4 0   CHLORIDE mmol/L 106 103   CO2 mmol/L 25 25   BUN mg/dL 22 25   CREATININE mg/dL 1 36* 1 68*   CALCIUM mg/dL 8 6 9 7     Lab Results   Component Value Date    NTBNP 5,201 (H) 2018    NTBNP 2,724 (H) 10/07/2018    NTBNP 1,508 (H) 2018        Results from last 7 days  Lab Units 18  0428 18  1318   MAGNESIUM mg/dL 1 8 1 9      Results from last 7 days  Lab Units 18  1318   TSH 3RD GENERATON uIU/mL 1 574       Results from last 7 days  Lab Units 18  1318   INR  1 54*     Cardiac testing:   Results for orders placed during the hospital encounter of 16   Echo complete with contrast if indicated    [facility] [address]  [address] [facility] [address]  [phone]    Transthoracic Echocardiogram  2D, M-mode, Doppler, and Color Doppler    Study date:  22-Mar-2016    Patient: Eleuterio Marrero  MR number: [id number]  Account number: [de-identified]  : 1965  Age: 46 years  Gender: Female  Status: Stat  Location: Echo lab  Height: 67 in  Weight: 226 6 lb  BP: / 53 mmHg    Indications: chest pain    Diagnoses: I25 83 - Coronary atherosclerosis due to lipid rich plaque    Sonographer:  PRISCILLA Garcia  Group:  Darryn Valentine  cc:  Michelle Owen MD  Interpreting Physician:  Taisha Espino DO    SUMMARY    LEFT VENTRICLE:  The cavity was small    Systolic function was hyperdynamic by visual assessment  Ejection fraction was  estimated in the range of 60 % to 65 %  There were no regional wall motion abnormalities  There was severe concentric hypertrophy  Features were consistent with a pseudonormal left ventricular filling pattern,  with concomitant abnormal relaxation and increased filling pressure (grade 2  diastolic dysfunction)  LEFT ATRIUM:  The atrium was mildly dilated  MITRAL VALVE:  There was trace regurgitation  AORTIC VALVE:  The valve was trileaflet  Leaflets exhibited normal cuspal separation and  sclerosis  There was no evidence for stenosis  There was no regurgitation  TRICUSPID VALVE:  There was trace regurgitation  HISTORY: PRIOR HISTORY: HTN, GERD, A Fib , Hyperlipidemia    PROCEDURE: The procedure was performed in the echo lab  This was a stat study  The transthoracic approach was used  The study included complete 2D imaging,  M-mode, complete spectral Doppler, and color Doppler  The heart rate was 54  bpm, at the start of the study  Intravenous contrast (Definity solution [1 3 ml  Definity/8 7ml normal saline solution], 5 ml) was administered to evaluate  myocardial perfusion  Echocardiographic views were limited due to decreased  penetration and lung interference  This was a technically difficult study  LEFT VENTRICLE: The cavity was small  Systolic function was hyperdynamic by  visual assessment  Ejection fraction was estimated in the range of 60 % to 65  %  There were no regional wall motion abnormalities  There was severe  concentric hypertrophy  DOPPLER: Features were consistent with a pseudonormal  left ventricular filling pattern, with concomitant abnormal relaxation and  increased filling pressure (grade 2 diastolic dysfunction)  RIGHT VENTRICLE: The size was normal  Systolic function was normal  DOPPLER:  Systolic pressure was within the normal range  LEFT ATRIUM: The atrium was mildly dilated      RIGHT ATRIUM: Size was normal     MITRAL VALVE: Valve structure was normal  There was normal leaflet separation  No echocardiographic evidence for prolapse  DOPPLER: The transmitral velocity  was within the normal range  There was no evidence for stenosis  There was  trace regurgitation  AORTIC VALVE: The valve was trileaflet  Leaflets exhibited normal cuspal  separation and sclerosis  DOPPLER: Transaortic velocity was within the normal  range  There was no evidence for stenosis  There was no regurgitation  TRICUSPID VALVE: The valve structure was normal  There was normal leaflet  separation  DOPPLER: The transtricuspid velocity was within the normal range  There was trace regurgitation  The tricuspid jet envelope definition was  inadequate for estimation of RV systolic pressure  There are no indirect  findings (abnormal RV volume or geometry, altered pulmonary flow velocity  profile, or leftward septal displacement) which would suggest moderate or  severe pulmonary hypertension  PULMONIC VALVE: Not well visualized  DOPPLER: The transpulmonic velocity was  within the normal range  There was no regurgitation  PERICARDIUM: There was no thickening  There was no pericardial effusion  AORTA: The root exhibited normal size  PULMONARY ARTERY: The size was normal  The morphology appeared normal     SYSTEM MEASUREMENT TABLES    2D mode  AoR Diam 2D: 3 4 cm  LA Diam (2D): 3 6 cm  LA/Ao (2D): 1 06  FS (2D Teich): 25 6 %  IVSd (2D): 1 65 cm  LVDEV: 80 8 cm³  LVESV: 39 7 cm³  LVIDd(2D): 4 25 cm  LVISd (2D): 3 16 cm  LVOT Area 2D: 3 14 cm squared  LVPWd (2D): 1 46 cm  SV (Teich): 41 1 cm³    Apical four chamber  LVEF A4C: 62 %    Unspecified Scan Mode  TWIN Cont Eq (Peak Zeferino): 2 37 cm squared  LVOT Diam : 2 cm  LVOT Vmax: 1820 mm/s  LVOT Vmax; Mean: 1820 mm/s  Peak Grad ; Mean: 13 mm[Hg]  MV Peak A Zeferino: 893 mm/s  MV Peak E Zeferino   Mean: 770 mm/s  MVA (PHT): 4 68 cm squared  PHT: 47 ms  RA Area: 16 9 cm squared  RA Volume: 44 8 cm³  TAPSE: 1 8 cm    [employer] Accredited Echocardiography Laboratory    Prepared and electronically signed by    Peter Evans DO  Signed 22-Mar-2016 13:51:32       Imaging: I have personally reviewed pertinent reports  Xr Chest 1 View Portable    Result Date: 12/26/2018  Narrative: CHEST INDICATION:   Chest pain  COMPARISON:  Chest x-ray 10/7/2018 EXAM PERFORMED/VIEWS:  XR CHEST PORTABLE FINDINGS:  AICD/pacemaker is noted, leads overlie the right atrium and right ventricle  There is stable cardiomegaly  The lungs are clear  No pneumothorax or pleural effusion  Osseous structures appear within normal limits for patient age  Impression: No active pulmonary disease  Workstation performed: [id number]     Xr Foot 3+ Views Left    Result Date: 12/9/2018  Narrative: LEFT FOOT INDICATION:   injury  COMPARISON:  None VIEWS:  XR FOOT 3+ VW LEFT Images: 3 FINDINGS: There is no acute fracture or dislocation  Calcaneal spurs are present  Degenerative changes at the 1st tarsal metatarsal joint  There is focal cortical thickening in the distal, medial aspect of the 4th metatarsal shaft  This is concerning for an osteoid osteoma  Soft tissues are unremarkable  Impression: 1  No acute osseous abnormality  2   Focal cortical thickening in the distal, medial aspect of the 4th metatarsal shaft, correlate for an osteoid osteoma  CT may be helpful  3   Degenerative changes at the 1st metatarsal-phalangeal joint  Workstation performed: [id number]     Ct Head Without Contrast    Result Date: 12/26/2018  Narrative: CT BRAIN - WITHOUT CONTRAST INDICATION:   Headache, hypertension  COMPARISON:  None  TECHNIQUE:  CT examination of the brain was performed  In addition to axial images, coronal 2D reformatted images were created and submitted for interpretation  Radiation dose length product (DLP) for this visit:  977 mGy-cm     This examination, like all CT scans performed in the [facility], was performed utilizing techniques to minimize radiation dose exposure, including the use of iterative reconstruction and automated exposure control  IMAGE QUALITY:  Diagnostic  FINDINGS: PARENCHYMA:  No intracranial mass, mass effect or midline shift  No CT signs of acute infarction  No acute parenchymal hemorrhage  VENTRICLES AND EXTRA-AXIAL SPACES:  Normal for the patient's age  VISUALIZED ORBITS AND PARANASAL SINUSES:  No acute abnormality involving the orbits  Mild scattered sinus mucosal thickening is noted  No fluid levels are seen  CALVARIUM AND EXTRACRANIAL SOFT TISSUES:  Normal      Impression: No acute intracranial abnormality  Workstation performed: [id number]     Assessment  Principal Problem:    Atrial fibrillation with RVR ([facility])  Active Problems:    Essential hypertension    Headache    Acute renal failure superimposed on stage 3 chronic kidney disease ([facility])    Nicotine dependence    NSTEMI (non-ST elevated myocardial infarction) ([facility] [address] )    Thank you for allowing us to participate in this patient's care  This pt will follow up with Dr Kyle Alvarenga once discharged  Counseling / Coordination of Care  Total floor / unit time spent today 45 minutes  Greater than 50% of total time was spent with the patient and / or family counseling and / or coordination of care  A description of the counseling / coordination of care: Review of history, current assessment, development of a plan  Code Status: Level 1 - Full Code    ** Please Note: Dragon 360 Dictation voice to text software may have been used in the creation of this document   **

## 2019-01-07 NOTE — INTERVAL H&P NOTE
Update: (This section must be completed if the H&P was completed greater than 24 hrs to procedure or admission)    H&P reviewed  After examining the patient, I find no changed to the H&P since it had been written  pt with icd, macy trop cath recommended  Patient re-evaluated   Accept as history and physical     Marlene Fink, DO/January 7, 2019/8:36 AM

## 2019-01-10 ENCOUNTER — IN-CLINIC DEVICE VISIT (OUTPATIENT)
Dept: CARDIOLOGY CLINIC | Facility: CLINIC | Age: 54
End: 2019-01-10
Payer: COMMERCIAL

## 2019-01-10 DIAGNOSIS — I48.0 PAROXYSMAL ATRIAL FIBRILLATION (HCC): ICD-10-CM

## 2019-01-10 DIAGNOSIS — I47.2 VENTRICULAR TACHYCARDIA (HCC): ICD-10-CM

## 2019-01-10 DIAGNOSIS — I42.2 HYPERTROPHIC CARDIOMYOPATHY (HCC): Primary | ICD-10-CM

## 2019-01-10 DIAGNOSIS — Z95.810 ICD (IMPLANTABLE CARDIOVERTER-DEFIBRILLATOR) IN PLACE: ICD-10-CM

## 2019-01-10 PROCEDURE — 93283 PRGRMG EVAL IMPLANTABLE DFB: CPT | Performed by: INTERNAL MEDICINE

## 2019-01-10 NOTE — PROGRESS NOTES
Results for orders placed or performed in visit on 01/10/19   Cardiac EP device report    Narrative    MDT DUAL ICD  DEVICE INTERROGATED IN THE [place] OFFICE: BATTERY VOLTAGE ADEQUATE (8 5 YRS)  AP - 71 5%  - <0 1%  ALL LEAD PARAMETERS WITHIN NORMAL LIMITS  ALL OTHER TESTING WITHIN NORMAL LIMITS  NO NEW SIGNIFICANT HIGH RATE EPISODES SINCE 12/26/18 HOSP  ADMIT  TOTAL AT/AF BURDEN - 2%  HX: PAF & PT ON XARELTO, METOPROLOL SUCC  OPTI-VOL WITHIN NORMAL LIMITS  PT SCHEDULED FOR HIP REPLACEMENT SURGERY 2/6/19 @ [facility]  NO PROGRAMMING CHANGES MADE TO DEVICE PARAMETERS  APPROPRIATELY FUNCTIONING ICD         EB

## 2019-01-23 ENCOUNTER — OFFICE VISIT (OUTPATIENT)
Dept: CARDIOLOGY CLINIC | Facility: CLINIC | Age: 54
End: 2019-01-23
Payer: COMMERCIAL

## 2019-01-23 VITALS
WEIGHT: 259.4 LBS | BODY MASS INDEX: 40.71 KG/M2 | HEIGHT: 67 IN | SYSTOLIC BLOOD PRESSURE: 114 MMHG | DIASTOLIC BLOOD PRESSURE: 84 MMHG | HEART RATE: 64 BPM | OXYGEN SATURATION: 97 %

## 2019-01-23 DIAGNOSIS — Z45.02 ICD (IMPLANTABLE CARDIOVERTER-DEFIBRILLATOR) DISCHARGE: ICD-10-CM

## 2019-01-23 DIAGNOSIS — Z01.810 PRE-OPERATIVE CARDIOVASCULAR EXAMINATION: ICD-10-CM

## 2019-01-23 DIAGNOSIS — I42.2 HYPERTROPHIC CARDIOMYOPATHY (HCC): ICD-10-CM

## 2019-01-23 DIAGNOSIS — I47.2 VENTRICULAR TACHYCARDIA (HCC): ICD-10-CM

## 2019-01-23 DIAGNOSIS — I10 ESSENTIAL HYPERTENSION: Chronic | ICD-10-CM

## 2019-01-23 DIAGNOSIS — I48.91 ATRIAL FIBRILLATION, UNSPECIFIED TYPE (HCC): Primary | ICD-10-CM

## 2019-01-23 PROCEDURE — 93000 ELECTROCARDIOGRAM COMPLETE: CPT | Performed by: INTERNAL MEDICINE

## 2019-01-23 PROCEDURE — 99244 OFF/OP CNSLTJ NEW/EST MOD 40: CPT | Performed by: INTERNAL MEDICINE

## 2019-01-23 RX ORDER — PANTOPRAZOLE SODIUM 40 MG/1
TABLET, DELAYED RELEASE ORAL
Refills: 0 | COMMUNITY
Start: 2019-01-08 | End: 2019-09-10 | Stop reason: HOSPADM

## 2019-01-23 NOTE — PROGRESS NOTES
Cardiology Follow Up    Lina Rubalcava  1965  [id number]  [address] [facility] [place]  [address] [facility] [address] [facility]  [phone]    1  Atrial fibrillation, unspecified type ([facility] [address] )  POCT ECG   2  Essential hypertension     3  Ventricular tachycardia ([facility] )     4  ICD (implantable cardioverter-defibrillator) discharge     5  Hypertrophic cardiomyopathy ([facility] )     6  Pre-operative cardiovascular examination       Chief Complaint   Patient presents with    Pre-op Exam     CC Hip SX 02/06/19 w/ Dr Malika Sears at [facility]   Follow-up     Hosp post cath 01/07/19       Interval History: Patient is here for follow-up after recent hospitalization for rapid afib  She has a known history of afib  She also has a history of an abnormal stress test that resulted in VT requiring an ICD placement in the setting of HCM  She had a cardiac catheterization with this admission for elevated troponin in this setting and was found to have normal coronaries  Patient feels well, without complaints  No reported chest pain, shortness of breath, palpitations, lightheadedness, syncope, LE edema, orthopnea, PND, or significant weight changes  Patient remains active without any increased fatigue out of the ordinary           Patient Active Problem List   Diagnosis    Chest pain    Atrial fibrillation (HCC)    Hyperlipidemia    Essential hypertension    Gastroesophageal reflux disease without esophagitis    Ventricular tachycardia ([address] )    ICD (implantable cardioverter-defibrillator) discharge    Nicotine dependence    NSTEMI (non-ST elevated myocardial infarction) ([facility] )    Hypertrophic cardiomyopathy ([facility] [address] )    Pre-operative cardiovascular examination     Past Medical History:   Diagnosis Date    Arthritis     Atrial fibrillation ([facility] )     Atrial fibrillation (HCC)     Breast lump     GERD (gastroesophageal reflux disease)     H/O transfusion 1987    Hepatitis C     resolved    Hepatitis C     Hyperlipidemia     Hypertension     Irregular heart beat     Pacemaker     Sleep apnea     no cpap     Social History     Social History    Marital status: /Civil Union     Spouse name: N/A    Number of children: N/A    Years of education: N/A     Occupational History    Not on file  Social History Main Topics    Smoking status: Current Every Day Smoker     Packs/day: 0 25     Years: 0 00     Types: Cigarettes    Smokeless tobacco: Never Used      Comment: about 3 daily    Alcohol use No    Drug use: No    Sexual activity: Not on file     Other Topics Concern    Not on file     Social History Narrative    disabled      Family History   Problem Relation Age of Onset    Arthritis Family     Cancer Family     Diabetes Family     Hypertension Family      Past Surgical History:   Procedure Laterality Date    CARDIAC CATHETERIZATION  01/07/2019    CARDIAC DEFIBRILLATOR PLACEMENT      CARDIAC PACEMAKER PLACEMENT  2016    AFIB     CHOLECYSTECTOMY      COLONOSCOPY      ELBOW SURGERY      HYSTERECTOMY      HYSTERECTOMY      JOINT REPLACEMENT Left 2015    TKR    KNEE SURGERY Left     CO ESOPHAGOGASTRODUODENOSCOPY TRANSORAL DIAGNOSTIC N/A 5/2/2018    Procedure: ESOPHAGOGASTRODUODENOSCOPY (EGD); Surgeon: Kristie Sheffield MD;  Location: [facility]; Service: Gastroenterology    CO L[facility]NGOSCOPY,DIRCT[facility] N/A 8/10/2018    Procedure: MICRO DIRECT LARYNGOSCOPY , EXCISION OF POLYPS, KTP LASER;  Surgeon: Jasper Abarca MD;  Location: [facility];  Service: ENT    REPLACEMENT TOTAL KNEE Left     THROAT SURGERY      polyps removed       Current Outpatient Prescriptions:     amLODIPine (NORVASC) 10 mg tablet, Take 10 mg by mouth daily  , Disp: , Rfl:     famotidine (PEPCID) 20 mg tablet, Take 20 mg by mouth daily, Disp: , Rfl:     lisinopril (ZESTRIL) 10 mg tablet, Take 10 mg by mouth daily  , Disp: , Rfl:    metoprolol succinate (TOPROL-XL) 50 mg 24 hr tablet, Take 1 tablet (50 mg total) by mouth every 12 (twelve) hours, Disp: 60 tablet, Rfl: 0    pantoprazole (PROTONIX) 40 mg tablet, , Disp: , Rfl: 0    rivaroxaban (XARELTO) 20 mg tablet, Take 20 mg by mouth daily with breakfast, Disp: , Rfl:     traMADol (ULTRAM) 50 mg tablet, Take 50 mg by mouth every 6 (six) hours as needed for moderate pain, Disp: , Rfl:   Allergies   Allergen Reactions    Iodinated Diagnostic Agents Hives    Tape  [Medical Tape] Hives       Labs:  Hospital Outpatient Visit on 01/07/2019   Component Date Value    Sodium 01/07/2019 138     Potassium 01/07/2019 5 1     Chloride 01/07/2019 104     CO2 01/07/2019 26     ANION GAP 01/07/2019 8     BUN 01/07/2019 28*    Creatinine 01/07/2019 1 54*    Glucose 01/07/2019 120     Glucose, Fasting 01/07/2019 120*    Calcium 01/07/2019 9 4     eGFR 01/07/2019 38     WBC 01/07/2019 10 02     RBC 01/07/2019 4 81     Hemoglobin 01/07/2019 12 8     Hematocrit 01/07/2019 40 0     MCV 01/07/2019 83     MCH 01/07/2019 26 6*    MCHC 01/07/2019 32 0     RDW 01/07/2019 13 8     Platelets 85/50/1162 192     MPV 01/07/2019 10 8     Cholesterol 01/07/2019 140     Triglycerides 01/07/2019 49     HDL, Direct 01/07/2019 50     LDL Calculated 01/07/2019 80     Non-HDL-Chol (CHOL-HDL) 01/07/2019 90     Magnesium 01/07/2019 2 1     Protime 01/07/2019 13 1     INR 01/07/2019 1 02    Admission on 12/26/2018, Discharged on 12/27/2018   Component Date Value    WBC 12/26/2018 7 78     RBC 12/26/2018 5 28*    Hemoglobin 12/26/2018 14 5     Hematocrit 12/26/2018 44 5     MCV 12/26/2018 84     MCH 12/26/2018 27 5     MCHC 12/26/2018 32 6     RDW 12/26/2018 13 8     MPV 12/26/2018 11 1     Platelets 26/80/0831 193     nRBC 12/26/2018 0     Neutrophils Relative 12/26/2018 66     Immat GRANS % 12/26/2018 0     Lymphocytes Relative 12/26/2018 25     Monocytes Relative 12/26/2018 6     Eosinophils Relative 12/26/2018 2     Basophils Relative 12/26/2018 1     Neutrophils Absolute 12/26/2018 5 22     Immature Grans Absolute 12/26/2018 0 03     Lymphocytes Absolute 12/26/2018 1 92     Monocytes Absolute 12/26/2018 0 44     Eosinophils Absolute 12/26/2018 0 13     Basophils Absolute 12/26/2018 0 04     Sodium 12/26/2018 140     Potassium 12/26/2018 4 0     Chloride 12/26/2018 103     CO2 12/26/2018 25     ANION GAP 12/26/2018 12     BUN 12/26/2018 25     Creatinine 12/26/2018 1 68*    Glucose 12/26/2018 137     Calcium 12/26/2018 9 7     AST 12/26/2018 29     ALT 12/26/2018 44     Alkaline Phosphatase 12/26/2018 84     Total Protein 12/26/2018 9 0*    Albumin 12/26/2018 4 4     Total Bilirubin 12/26/2018 0 30     eGFR 12/26/2018 34     Magnesium 12/26/2018 1 9     Color, UA 12/26/2018 Yellow     Clarity, UA 12/26/2018 Clear     Specific Gravity, UA 12/26/2018 1 010     pH, UA 12/26/2018 7 0     Leukocytes, UA 12/26/2018 Negative     Nitrite, UA 12/26/2018 Negative     Protein, UA 12/26/2018 30 (1+)*    Glucose, UA 12/26/2018 Negative     Ketones, UA 12/26/2018 Negative     Urobilinogen, UA 12/26/2018 0 2     Bilirubin, UA 12/26/2018 Negative     Blood, UA 12/26/2018 Trace-Intact*    Troponin I 12/26/2018 0 07*    NT-proBNP 12/26/2018 5201*    Protime 12/26/2018 18 0*    INR 12/26/2018 1 54*    TSH 3RD GENERATON 12/26/2018 1 574     RBC, UA 12/26/2018 0-1*    WBC, UA 12/26/2018 None Seen     Epithelial Cells 12/26/2018 Occasional     Bacteria, UA 12/26/2018 Occasional     Troponin I 12/26/2018 0 21*    Troponin I 12/26/2018 0 33*    Troponin I 12/27/2018 0 49*    Sodium 12/27/2018 139     Potassium 12/27/2018 4 1     Chloride 12/27/2018 106     CO2 12/27/2018 25     ANION GAP 12/27/2018 8     BUN 12/27/2018 22     Creatinine 12/27/2018 1 36*    Glucose 12/27/2018 90     Calcium 12/27/2018 8 6     eGFR 12/27/2018 44     Magnesium 12/27/2018 1 8  Troponin I 12/27/2018 0 42*    Ventricular Rate 12/26/2018 146     Atrial Rate 12/26/2018 153     QRSD Interval 12/26/2018 146     QT Interval 12/26/2018 318     QTC Interval 12/26/2018 495     QRS Axis 12/26/2018 -36     T Wave Frost 12/26/2018 119    Admission on 10/07/2018, Discharged on 10/07/2018   Component Date Value    WBC 10/07/2018 5 30     RBC 10/07/2018 4 49     Hemoglobin 10/07/2018 12 2     Hematocrit 10/07/2018 38 2     MCV 10/07/2018 85     MCH 10/07/2018 27 2     MCHC 10/07/2018 31 9     RDW 10/07/2018 13 0     MPV 10/07/2018 11 2     Platelets 83/06/5907 150     nRBC 10/07/2018 0     Neutrophils Relative 10/07/2018 63     Immat GRANS % 10/07/2018 1     Lymphocytes Relative 10/07/2018 25     Monocytes Relative 10/07/2018 8     Eosinophils Relative 10/07/2018 3     Basophils Relative 10/07/2018 0     Neutrophils Absolute 10/07/2018 3 35     Immature Grans Absolute 10/07/2018 0 03     Lymphocytes Absolute 10/07/2018 1 30     Monocytes Absolute 10/07/2018 0 44     Eosinophils Absolute 10/07/2018 0 16     Basophils Absolute 10/07/2018 0 02     Sodium 10/07/2018 140     Potassium 10/07/2018 4 6     Chloride 10/07/2018 105     CO2 10/07/2018 30     ANION GAP 10/07/2018 5     BUN 10/07/2018 17     Creatinine 10/07/2018 1 27     Glucose 10/07/2018 81     Calcium 10/07/2018 8 7     AST 10/07/2018 35     ALT 10/07/2018 43     Alkaline Phosphatase 10/07/2018 76     Total Protein 10/07/2018 7 6     Albumin 10/07/2018 3 7     Total Bilirubin 10/07/2018 0 30     eGFR 10/07/2018 48     NT-proBNP 10/07/2018 2724*    Troponin I 10/07/2018 0 04     Ventricular Rate 10/07/2018 59     Atrial Rate 10/07/2018 535     QRSD Interval 10/07/2018 132     QT Interval 10/07/2018 458     QTC Interval 10/07/2018 453     P Axis 10/07/2018 51     QRS Frost 10/07/2018 -21     T Wave Axis 10/07/2018 172    Admission on 09/08/2018, Discharged on 09/08/2018   Component Date Value    WBC 09/08/2018 5 10     RBC 09/08/2018 4 27     Hemoglobin 09/08/2018 11 7     Hematocrit 09/08/2018 36 8     MCV 09/08/2018 86     MCH 09/08/2018 27 4     MCHC 09/08/2018 31 8     RDW 09/08/2018 13 1     MPV 09/08/2018 10 7     Platelets 24/89/6800 144*    nRBC 09/08/2018 0     Neutrophils Relative 09/08/2018 65     Immat GRANS % 09/08/2018 0     Lymphocytes Relative 09/08/2018 25     Monocytes Relative 09/08/2018 7     Eosinophils Relative 09/08/2018 2     Basophils Relative 09/08/2018 1     Neutrophils Absolute 09/08/2018 3 30     Immature Grans Absolute 09/08/2018 0 02     Lymphocytes Absolute 09/08/2018 1 27     Monocytes Absolute 09/08/2018 0 36     Eosinophils Absolute 09/08/2018 0 12     Basophils Absolute 09/08/2018 0 03     NT-proBNP 09/08/2018 1508*    Troponin I 09/08/2018 0 03     Sodium 09/08/2018 141     Potassium 09/08/2018 4 2     Chloride 09/08/2018 106     CO2 09/08/2018 27     ANION GAP 09/08/2018 8     BUN 09/08/2018 20     Creatinine 09/08/2018 1 39*    Glucose 09/08/2018 114     Calcium 09/08/2018 8 6     AST 09/08/2018 28     ALT 09/08/2018 41     Alkaline Phosphatase 09/08/2018 95     Total Protein 09/08/2018 7 6     Albumin 09/08/2018 3 7     Total Bilirubin 09/08/2018 0 30     eGFR 09/08/2018 43     Ventricular Rate 09/08/2018 59     Atrial Rate 09/08/2018 59     QRSD Interval 09/08/2018 130     QT Interval 09/08/2018 464     QTC Interval 09/08/2018 459     P Axis 09/08/2018 52     QRS Axis 09/08/2018 -20     T Wave Axis 09/08/2018 177    Admission on 08/10/2018, Discharged on 08/10/2018   Component Date Value    Case Report 08/10/2018                      Value:Surgical Pathology Report                         Case: [id number]                                   Authorizing Provider:  Angy Avalos MD          Collected:           08/10/2018 0757              Ordering Location:     [address] [place] [address]        Received: 08/10/2018 [address]                                                      Pathologist:           Mandi Mahan MD                                                              Specimens:   A) - Vocal cord, Right vocal cord polyp                                                             B) - Vocal cord, Left vocal cord polyp                                                     Final Diagnosis 08/10/2018                      Value: This result contains rich text formatting which cannot be displayed here   Note 08/10/2018                      Value: This result contains rich text formatting which cannot be displayed here   Additional Information 08/10/2018                      Value: This result contains rich text formatting which cannot be displayed here  Savannah Jackson Gross Description 08/10/2018                      Value: This result contains rich text formatting which cannot be displayed here     Lab on 08/02/2018   Component Date Value    WBC 08/02/2018 8 04     RBC 08/02/2018 4 75     Hemoglobin 08/02/2018 12 8     Hematocrit 08/02/2018 40 6     MCV 08/02/2018 86     MCH 08/02/2018 26 9     MCHC 08/02/2018 31 5     RDW 08/02/2018 13 3     Platelets 35/93/4460 188     MPV 08/02/2018 11 8     Sodium 08/02/2018 139     Potassium 08/02/2018 3 6     Chloride 08/02/2018 104     CO2 08/02/2018 27     ANION GAP 08/02/2018 8     BUN 08/02/2018 11     Creatinine 08/02/2018 1 34*    Glucose 08/02/2018 130     Calcium 08/02/2018 8 9     eGFR 08/02/2018 45     TSH 3RD GENERATON 08/02/2018 0 521     Free T4 08/02/2018 1 36     MANUEL 08/02/2018 Negative     Rheumatoid Factor 08/02/2018 Positive*    H Pylori IgG 08/02/2018 0 47     H  pylori IgM 08/02/2018 <9 0     IgA 08/02/2018 134     Gliadin IgA 08/02/2018 3     Gliadin IgG 08/02/2018 5     Tissue Transglut Ab IGG 08/02/2018 2     TISSUE TRANSGLUTAMINASE * 08/02/2018 <2     Endomysial IgA 08/02/2018 Negative     LYME AB IGG 08/02/2018 0 07     LYME AB IGM 08/02/2018 0 14     Miscellaneous Lab Test R* 08/02/2018 SEE WRITTEN REPORT     Ventricular Rate 08/02/2018 76     Atrial Rate 08/02/2018 76     RI Interval 08/02/2018 168     QRSD Interval 08/02/2018 146     QT Interval 08/02/2018 446     QTC Interval 08/02/2018 501     P Axis 08/02/2018 70     QRS Axis 08/02/2018 -44     T Wave Axis 08/02/2018 120     RF Quantitation 08/02/2018 20 IU/mL*     Lab Results   Component Value Date    TRIG 49 01/07/2019    HDL 50 01/07/2019     Imaging: Xr Chest 1 View Portable    Result Date: 12/26/2018  Narrative: CHEST INDICATION:   Chest pain  COMPARISON:  Chest x-ray 10/7/2018 EXAM PERFORMED/VIEWS:  XR CHEST PORTABLE FINDINGS:  AICD/pacemaker is noted, leads overlie the right atrium and right ventricle  There is stable cardiomegaly  The lungs are clear  No pneumothorax or pleural effusion  Osseous structures appear within normal limits for patient age  Impression: No active pulmonary disease  Workstation performed: [id number]     Ct Head Without Contrast    Result Date: 12/26/2018  Narrative: CT BRAIN - WITHOUT CONTRAST INDICATION:   Headache, hypertension  COMPARISON:  None  TECHNIQUE:  CT examination of the brain was performed  In addition to axial images, coronal 2D reformatted images were created and submitted for interpretation  Radiation dose length product (DLP) for this visit:  977 mGy-cm   This examination, like all CT scans performed in the [facility], was performed utilizing techniques to minimize radiation dose exposure, including the use of iterative reconstruction and automated exposure control  IMAGE QUALITY:  Diagnostic  FINDINGS: PARENCHYMA:  No intracranial mass, mass effect or midline shift  No CT signs of acute infarction  No acute parenchymal hemorrhage  VENTRICLES AND EXTRA-AXIAL SPACES:  Normal for the patient's age   VISUALIZED ORBITS AND PARANASAL SINUSES:  No acute abnormality involving the orbits  Mild scattered sinus mucosal thickening is noted  No fluid levels are seen  CALVARIUM AND EXTRACRANIAL SOFT TISSUES:  Normal      Impression: No acute intracranial abnormality  Workstation performed: [id number]       Review of Systems:  Review of Systems   Constitutional: Negative for activity change, appetite change, chills, diaphoresis, fatigue and unexpected weight change  HENT: Negative for hearing loss, nosebleeds and sore throat  Eyes: Negative for photophobia and visual disturbance  Respiratory: Negative for cough, chest tightness, shortness of breath and wheezing  Cardiovascular: Negative for chest pain, palpitations and leg swelling  Gastrointestinal: Negative for abdominal pain, diarrhea, nausea and vomiting  Endocrine: Negative for polyuria  Genitourinary: Negative for dysuria, frequency and hematuria  Musculoskeletal: Positive for arthralgias and gait problem  Negative for back pain and neck pain  Skin: Negative for pallor and rash  Neurological: Negative for dizziness, syncope and headaches  Hematological: Does not bruise/bleed easily  Psychiatric/Behavioral: Negative for behavioral problems and confusion  Physical Exam:  Physical Exam   Constitutional: She is oriented to person, place, and time  She appears well-developed and well-nourished  HENT:   Head: Normocephalic and atraumatic  Nose: Nose normal    Eyes: Pupils are equal, round, and reactive to light  EOM are normal  No scleral icterus  Neck: Normal range of motion  Neck supple  No JVD present  Cardiovascular: Normal rate and regular rhythm  Exam reveals no gallop and no friction rub  Murmur heard  Systolic murmur is present with a grade of 2/6   Pulmonary/Chest: Effort normal and breath sounds normal  No respiratory distress  She has no wheezes  She has no rales  Abdominal: Soft  Bowel sounds are normal  She exhibits no distension  There is no tenderness  Musculoskeletal: Normal range of motion  She exhibits no edema or deformity  Neurological: She is alert and oriented to person, place, and time  No cranial nerve deficit  Skin: Skin is warm and dry  No rash noted  She is not diaphoretic  Psychiatric: She has a normal mood and affect  Her behavior is normal    Vitals reviewed  Blood pressure 114/84, pulse 64, height 5' 7" (1 702 m), weight 118 kg (259 lb 6 4 oz), SpO2 97 %, not currently breastfeeding  EKG:  Atrial-paced rhythm  Left ventricular hypertrophy with QRS widening and repolarization abnormality  Abnormal ECG    Discussion/Summary:  Afib: currently in A-paced rhythm on EKG  Rates controlled on B-blocker and on Xarelto for anticoagulation  HTN: well controlled on current regimen  HCM: with h/o VT s/p ICD  Continued on B-blocker  An echo was ordered, this has yet to be done  Pre-op cardiovascular exam: patient would be intermediate risk for intermediate risk orthopedic surgery  Continue B-blocker leatha-op and avoid preload reduction in light of HCM  With normal cardiac cath  Proceed with planned surgery without any further cardiac work-up  Patient may stop Xarelto for 3 days prior to surgery and resume when safe from bleeding perspective post-op

## 2019-01-23 NOTE — LETTER
Cardiology Pre Operative Clearance      PRE OPERATIVE CARDIAC RISK ASSESSMENT    01/23/19    Gerda Waters  1965  [id number]    Date of Surgery: 02/06/2019    Type of Surgery: Left Hip surgery    Surgeon: Keith Ruano MD    No Cardiac Contraindication for Planned Surgical Procedures    Anticoagulation: Yes, Xarelto 20 mg daily - please hold for up to 3 days prior to surgery and resume when safe from bleeding perspective post-op  Physician Comment: Proceed with planned surgery with B-blocker on board and avoid pre-load reduction leatha-op due to HCM  Electronically Signed: Patty Barrera MD, [facility]

## 2019-01-23 NOTE — PATIENT INSTRUCTIONS

## 2019-02-01 ENCOUNTER — TELEPHONE (OUTPATIENT)
Dept: CARDIOLOGY CLINIC | Facility: CLINIC | Age: 54
End: 2019-02-01

## 2019-03-19 DIAGNOSIS — K21.9 REFLUX LARYNGITIS: ICD-10-CM

## 2019-03-19 DIAGNOSIS — J04.0 REFLUX LARYNGITIS: ICD-10-CM

## 2019-03-19 DIAGNOSIS — K21.00 GASTROESOPHAGEAL REFLUX DISEASE WITH ESOPHAGITIS: ICD-10-CM

## 2019-03-19 RX ORDER — PANTOPRAZOLE SODIUM 40 MG/1
TABLET, DELAYED RELEASE ORAL
Qty: 30 TABLET | Refills: 3 | Status: SHIPPED | OUTPATIENT
Start: 2019-03-19 | End: 2019-08-11 | Stop reason: SDUPTHER

## 2019-04-17 ENCOUNTER — IN-CLINIC DEVICE VISIT (OUTPATIENT)
Dept: CARDIOLOGY CLINIC | Facility: CLINIC | Age: 54
End: 2019-04-17
Payer: COMMERCIAL

## 2019-04-17 DIAGNOSIS — I48.0 PAROXYSMAL ATRIAL FIBRILLATION (HCC): Primary | ICD-10-CM

## 2019-04-17 DIAGNOSIS — Z95.810 PRESENCE OF AUTOMATIC CARDIOVERTER/DEFIBRILLATOR (AICD): ICD-10-CM

## 2019-04-17 DIAGNOSIS — I47.2 VENTRICULAR TACHYARRHYTHMIA (HCC): ICD-10-CM

## 2019-04-17 PROCEDURE — 93283 PRGRMG EVAL IMPLANTABLE DFB: CPT | Performed by: INTERNAL MEDICINE

## 2019-04-18 ENCOUNTER — HOSPITAL ENCOUNTER (OUTPATIENT)
Dept: NON INVASIVE DIAGNOSTICS | Facility: CLINIC | Age: 54
Discharge: HOME | End: 2019-04-18
Payer: COMMERCIAL

## 2019-04-18 DIAGNOSIS — I48.91 ATRIAL FIBRILLATION WITH RVR (HCC): Chronic | ICD-10-CM

## 2019-04-18 PROCEDURE — 93306 TTE W/DOPPLER COMPLETE: CPT | Performed by: INTERNAL MEDICINE

## 2019-04-18 PROCEDURE — 93306 TTE W/DOPPLER COMPLETE: CPT

## 2019-04-18 RX ADMIN — PERFLUTREN 0.8 ML/MIN: 6.52 INJECTION, SUSPENSION INTRAVENOUS at 10:35

## 2019-06-26 ENCOUNTER — HOSPITAL ENCOUNTER (EMERGENCY)
Facility: HOSPITAL | Age: 54
Discharge: HOME | End: 2019-06-26
Attending: EMERGENCY MEDICINE | Admitting: EMERGENCY MEDICINE
Payer: COMMERCIAL

## 2019-06-26 ENCOUNTER — APPOINTMENT (EMERGENCY)
Dept: RADIOLOGY | Facility: HOSPITAL | Age: 54
End: 2019-06-26
Payer: COMMERCIAL

## 2019-06-26 VITALS
DIASTOLIC BLOOD PRESSURE: 80 MMHG | TEMPERATURE: 99 F | OXYGEN SATURATION: 95 % | SYSTOLIC BLOOD PRESSURE: 170 MMHG | HEART RATE: 70 BPM | RESPIRATION RATE: 18 BRPM

## 2019-06-26 DIAGNOSIS — S92.501A CLOSED FRACTURE OF PHALANX OF RIGHT FOURTH TOE, INITIAL ENCOUNTER: Primary | ICD-10-CM

## 2019-06-26 PROCEDURE — 99283 EMERGENCY DEPT VISIT LOW MDM: CPT

## 2019-06-26 PROCEDURE — 73630 X-RAY EXAM OF FOOT: CPT

## 2019-06-26 PROCEDURE — 99283 EMERGENCY DEPT VISIT LOW MDM: CPT | Performed by: PHYSICIAN ASSISTANT

## 2019-06-26 RX ORDER — ACETAMINOPHEN 325 MG/1
650 TABLET ORAL ONCE
Status: COMPLETED | OUTPATIENT
Start: 2019-06-26 | End: 2019-06-26

## 2019-06-26 RX ORDER — ACETAMINOPHEN 500 MG
500 TABLET ORAL EVERY 4 HOURS PRN
Qty: 20 TABLET | Refills: 0 | Status: SHIPPED | OUTPATIENT
Start: 2019-06-26 | End: 2019-07-01

## 2019-06-26 RX ADMIN — ACETAMINOPHEN 650 MG: 325 TABLET, FILM COATED ORAL at 21:00

## 2019-07-02 ENCOUNTER — TELEPHONE (OUTPATIENT)
Dept: CARDIOLOGY CLINIC | Facility: CLINIC | Age: 54
End: 2019-07-02

## 2019-07-02 NOTE — TELEPHONE ENCOUNTER
----- Message from [facility] sent at 7/1/2019 11:07 AM EDT -----  Regarding: opti-vol crossed  Shabana Anthony,  Pts optivol crossed since early June  Pt is not on diuretics  Pt takes metoprolol succ, xarelto  EF: 65% (echo 04/18/19)  Karen Isbell  NON-BILLABLE CARELINK TRANSMISSION: BATTERY VOLTAGE ADEQUATE (7 7 YRS)  AP: 58 7%  : <0 1% (MVP-ON)  ALL AVAILABLE LEAD PARAMETERS WITHIN NORMAL LIMITS  1 AT/ AF EPISODE W/ EGRAM SHOWING PAT, DURATION 58 SECS  PT TAKES XARELTO, METOPROLOL SUC  EF: 65% (ECHO 04/18/19)  OPTI-VOL FLUID THRESHOLD CROSSED SINCE EARLY JUNE  TASK TO HF RN  APPROPRIATELY FUNCTIONING ICD    [facility] AND [facility]

## 2019-07-02 NOTE — TELEPHONE ENCOUNTER
S/w Marquise Levine, c/o some LE edema for a couple weeks now and fatigue  Overdue for follow up   Scheduled appt at [facility] 7/17

## 2019-07-15 ENCOUNTER — REMOTE DEVICE CLINIC VISIT (OUTPATIENT)
Dept: CARDIOLOGY CLINIC | Facility: CLINIC | Age: 54
End: 2019-07-15
Payer: COMMERCIAL

## 2019-07-15 DIAGNOSIS — Z95.810 AICD (AUTOMATIC CARDIOVERTER/DEFIBRILLATOR) PRESENT: Primary | ICD-10-CM

## 2019-07-15 PROCEDURE — 93296 REM INTERROG EVL PM/IDS: CPT | Performed by: INTERNAL MEDICINE

## 2019-07-15 PROCEDURE — 93295 DEV INTERROG REMOTE 1/2/MLT: CPT | Performed by: INTERNAL MEDICINE

## 2019-07-17 DIAGNOSIS — I42.2 HYPERTROPHIC CARDIOMYOPATHY (HCC): Primary | ICD-10-CM

## 2019-07-17 RX ORDER — FUROSEMIDE 20 MG/1
20 TABLET ORAL DAILY
Qty: 30 TABLET | Refills: 3 | Status: SHIPPED | OUTPATIENT
Start: 2019-07-17 | End: 2019-09-10 | Stop reason: HOSPADM

## 2019-08-09 ENCOUNTER — TRANSCRIBE ORDERS (OUTPATIENT)
Dept: ADMINISTRATIVE | Facility: HOSPITAL | Age: 54
End: 2019-08-09

## 2019-08-09 DIAGNOSIS — R22.31 FOREARM MASS, RIGHT: Primary | ICD-10-CM

## 2019-08-11 DIAGNOSIS — K21.00 GASTROESOPHAGEAL REFLUX DISEASE WITH ESOPHAGITIS: ICD-10-CM

## 2019-08-11 DIAGNOSIS — K21.9 REFLUX LARYNGITIS: ICD-10-CM

## 2019-08-11 DIAGNOSIS — J04.0 REFLUX LARYNGITIS: ICD-10-CM

## 2019-08-12 RX ORDER — PANTOPRAZOLE SODIUM 40 MG/1
TABLET, DELAYED RELEASE ORAL
Qty: 30 TABLET | Refills: 6 | Status: SHIPPED | OUTPATIENT
Start: 2019-08-12 | End: 2020-05-08 | Stop reason: SDUPTHER

## 2019-08-15 ENCOUNTER — REMOTE DEVICE CLINIC VISIT (OUTPATIENT)
Dept: CARDIOLOGY CLINIC | Facility: CLINIC | Age: 54
End: 2019-08-15
Payer: COMMERCIAL

## 2019-08-15 DIAGNOSIS — Z95.810 AICD (AUTOMATIC CARDIOVERTER/DEFIBRILLATOR) PRESENT: Primary | ICD-10-CM

## 2019-08-15 PROCEDURE — 93297 REM INTERROG DEV EVAL ICPMS: CPT | Performed by: INTERNAL MEDICINE

## 2019-08-15 PROCEDURE — 93299 PR REM INTERROG ICPMS/SCRMS <30 D TECH REVIEW: CPT | Performed by: INTERNAL MEDICINE

## 2019-08-15 NOTE — PROGRESS NOTES
Results for orders placed or performed in visit on 08/15/19   Cardiac EP device report    Narrative    MDT DUAL ICD  [place] 1233: OPTI-VOL WITHIN NORMAL LIMITS  BATTERY VOLTAGE ADEQUATE (7 6 YRS)  AP-60%, -0 4%  ALL AVAILABLE LEAD PARAMETERS WITHIN NORMAL LIMITS  1 VHR EPISODE FOR 16 BEATS E @ 194 BPM- SVT ON EGM  3 AF EPISODES MAX DURATION 11 1 HRS  HX: PAF & ON XARELTO & METOPROLOL  AF BURDEN-2 8%  NORMAL DEVICE FUNCTION   GV

## 2019-09-09 ENCOUNTER — APPOINTMENT (EMERGENCY)
Dept: RADIOLOGY | Facility: HOSPITAL | Age: 54
End: 2019-09-09
Payer: COMMERCIAL

## 2019-09-09 ENCOUNTER — HOSPITAL ENCOUNTER (OUTPATIENT)
Facility: HOSPITAL | Age: 54
Setting detail: OBSERVATION
Discharge: HOME | End: 2019-09-10
Attending: EMERGENCY MEDICINE | Admitting: INTERNAL MEDICINE
Payer: COMMERCIAL

## 2019-09-09 DIAGNOSIS — R00.2 PALPITATIONS: ICD-10-CM

## 2019-09-09 DIAGNOSIS — R07.9 CHEST PAIN: Primary | ICD-10-CM

## 2019-09-09 DIAGNOSIS — R77.8 ELEVATED TROPONIN: ICD-10-CM

## 2019-09-09 PROBLEM — N17.9 AKI (ACUTE KIDNEY INJURY) (HCC): Status: ACTIVE | Noted: 2019-09-09

## 2019-09-09 PROBLEM — R79.89 ELEVATED TROPONIN: Status: ACTIVE | Noted: 2019-09-09

## 2019-09-09 LAB
ALBUMIN SERPL BCP-MCNC: 3.9 G/DL (ref 3.5–5)
ALP SERPL-CCNC: 69 U/L (ref 46–116)
ALT SERPL W P-5'-P-CCNC: 27 U/L (ref 12–78)
AMPHETAMINES SERPL QL SCN: NEGATIVE
ANION GAP SERPL CALCULATED.3IONS-SCNC: 9 MMOL/L (ref 4–13)
AST SERPL W P-5'-P-CCNC: 23 U/L (ref 5–45)
BARBITURATES UR QL: NEGATIVE
BASOPHILS # BLD AUTO: 0.01 THOUSANDS/ΜL (ref 0–0.1)
BASOPHILS NFR BLD AUTO: 0 % (ref 0–1)
BENZODIAZ UR QL: NEGATIVE
BILIRUB SERPL-MCNC: 0.3 MG/DL (ref 0.2–1)
BUN SERPL-MCNC: 16 MG/DL (ref 5–25)
CALCIUM SERPL-MCNC: 9 MG/DL (ref 8.3–10.1)
CHLORIDE SERPL-SCNC: 105 MMOL/L (ref 100–108)
CO2 SERPL-SCNC: 27 MMOL/L (ref 21–32)
COCAINE UR QL: NEGATIVE
CREAT SERPL-MCNC: 2 MG/DL (ref 0.6–1.3)
EOSINOPHIL # BLD AUTO: 0.07 THOUSAND/ΜL (ref 0–0.61)
EOSINOPHIL NFR BLD AUTO: 1 % (ref 0–6)
ERYTHROCYTE [DISTWIDTH] IN BLOOD BY AUTOMATED COUNT: 14 % (ref 11.6–15.1)
ETHANOL SERPL-MCNC: <3 MG/DL (ref 0–3)
GFR SERPL CREATININE-BSD FRML MDRD: 28 ML/MIN/1.73SQ M
GLUCOSE SERPL-MCNC: 126 MG/DL (ref 65–140)
HCT VFR BLD AUTO: 37.4 % (ref 34.8–46.1)
HGB BLD-MCNC: 11.8 G/DL (ref 11.5–15.4)
IMM GRANULOCYTES # BLD AUTO: 0.01 THOUSAND/UL (ref 0–0.2)
IMM GRANULOCYTES NFR BLD AUTO: 0 % (ref 0–2)
LYMPHOCYTES # BLD AUTO: 1.46 THOUSANDS/ΜL (ref 0.6–4.47)
LYMPHOCYTES NFR BLD AUTO: 23 % (ref 14–44)
MAGNESIUM SERPL-MCNC: 1.9 MG/DL (ref 1.6–2.6)
MCH RBC QN AUTO: 27.4 PG (ref 26.8–34.3)
MCHC RBC AUTO-ENTMCNC: 31.6 G/DL (ref 31.4–37.4)
MCV RBC AUTO: 87 FL (ref 82–98)
METHADONE UR QL: NEGATIVE
MONOCYTES # BLD AUTO: 0.41 THOUSAND/ΜL (ref 0.17–1.22)
MONOCYTES NFR BLD AUTO: 6 % (ref 4–12)
NEUTROPHILS # BLD AUTO: 4.53 THOUSANDS/ΜL (ref 1.85–7.62)
NEUTS SEG NFR BLD AUTO: 70 % (ref 43–75)
NRBC BLD AUTO-RTO: 0 /100 WBCS
OPIATES UR QL SCN: NEGATIVE
PCP UR QL: NEGATIVE
PLATELET # BLD AUTO: 137 THOUSANDS/UL (ref 149–390)
PMV BLD AUTO: 11.3 FL (ref 8.9–12.7)
POTASSIUM SERPL-SCNC: 3.7 MMOL/L (ref 3.5–5.3)
PROT SERPL-MCNC: 7.8 G/DL (ref 6.4–8.2)
RBC # BLD AUTO: 4.31 MILLION/UL (ref 3.81–5.12)
SODIUM SERPL-SCNC: 141 MMOL/L (ref 136–145)
THC UR QL: NEGATIVE
TROPONIN I SERPL-MCNC: 0.05 NG/ML
TROPONIN I SERPL-MCNC: 0.07 NG/ML
TSH SERPL DL<=0.05 MIU/L-ACNC: 0.34 UIU/ML (ref 0.36–3.74)
WBC # BLD AUTO: 6.49 THOUSAND/UL (ref 4.31–10.16)

## 2019-09-09 PROCEDURE — 85025 COMPLETE CBC W/AUTO DIFF WBC: CPT | Performed by: EMERGENCY MEDICINE

## 2019-09-09 PROCEDURE — 36415 COLL VENOUS BLD VENIPUNCTURE: CPT | Performed by: EMERGENCY MEDICINE

## 2019-09-09 PROCEDURE — 84484 ASSAY OF TROPONIN QUANT: CPT | Performed by: PHYSICIAN ASSISTANT

## 2019-09-09 PROCEDURE — 80320 DRUG SCREEN QUANTALCOHOLS: CPT | Performed by: EMERGENCY MEDICINE

## 2019-09-09 PROCEDURE — 80053 COMPREHEN METABOLIC PANEL: CPT | Performed by: EMERGENCY MEDICINE

## 2019-09-09 PROCEDURE — 84484 ASSAY OF TROPONIN QUANT: CPT | Performed by: EMERGENCY MEDICINE

## 2019-09-09 PROCEDURE — 83735 ASSAY OF MAGNESIUM: CPT | Performed by: PHYSICIAN ASSISTANT

## 2019-09-09 PROCEDURE — 71046 X-RAY EXAM CHEST 2 VIEWS: CPT

## 2019-09-09 PROCEDURE — 99220 PR INITIAL OBSERVATION CARE/DAY 70 MINUTES: CPT | Performed by: PHYSICIAN ASSISTANT

## 2019-09-09 PROCEDURE — 80307 DRUG TEST PRSMV CHEM ANLYZR: CPT | Performed by: EMERGENCY MEDICINE

## 2019-09-09 PROCEDURE — 99285 EMERGENCY DEPT VISIT HI MDM: CPT | Performed by: EMERGENCY MEDICINE

## 2019-09-09 PROCEDURE — 84443 ASSAY THYROID STIM HORMONE: CPT | Performed by: PHYSICIAN ASSISTANT

## 2019-09-09 PROCEDURE — 93005 ELECTROCARDIOGRAM TRACING: CPT

## 2019-09-09 PROCEDURE — 99285 EMERGENCY DEPT VISIT HI MDM: CPT

## 2019-09-09 RX ORDER — FAMOTIDINE 20 MG/1
20 TABLET, FILM COATED ORAL DAILY
Status: DISCONTINUED | OUTPATIENT
Start: 2019-09-10 | End: 2019-09-10 | Stop reason: HOSPADM

## 2019-09-09 RX ORDER — NICOTINE 21 MG/24HR
1 PATCH, TRANSDERMAL 24 HOURS TRANSDERMAL DAILY
Status: DISCONTINUED | OUTPATIENT
Start: 2019-09-10 | End: 2019-09-10 | Stop reason: HOSPADM

## 2019-09-09 RX ORDER — SODIUM CHLORIDE 9 MG/ML
75 INJECTION, SOLUTION INTRAVENOUS CONTINUOUS
Status: DISCONTINUED | OUTPATIENT
Start: 2019-09-09 | End: 2019-09-10

## 2019-09-09 RX ORDER — TRAMADOL HYDROCHLORIDE 50 MG/1
50 TABLET ORAL EVERY 6 HOURS PRN
Status: DISCONTINUED | OUTPATIENT
Start: 2019-09-09 | End: 2019-09-10 | Stop reason: HOSPADM

## 2019-09-09 RX ORDER — ACETAMINOPHEN 325 MG/1
650 TABLET ORAL EVERY 6 HOURS PRN
Status: DISCONTINUED | OUTPATIENT
Start: 2019-09-09 | End: 2019-09-10 | Stop reason: HOSPADM

## 2019-09-09 RX ORDER — PANTOPRAZOLE SODIUM 40 MG/1
40 TABLET, DELAYED RELEASE ORAL
Status: DISCONTINUED | OUTPATIENT
Start: 2019-09-10 | End: 2019-09-10 | Stop reason: HOSPADM

## 2019-09-09 RX ORDER — AMLODIPINE BESYLATE 10 MG/1
10 TABLET ORAL DAILY
Status: DISCONTINUED | OUTPATIENT
Start: 2019-09-10 | End: 2019-09-10 | Stop reason: HOSPADM

## 2019-09-09 RX ORDER — METOPROLOL SUCCINATE 50 MG/1
50 TABLET, EXTENDED RELEASE ORAL EVERY 12 HOURS SCHEDULED
Status: DISCONTINUED | OUTPATIENT
Start: 2019-09-09 | End: 2019-09-10 | Stop reason: HOSPADM

## 2019-09-09 RX ORDER — ONDANSETRON 2 MG/ML
4 INJECTION INTRAMUSCULAR; INTRAVENOUS EVERY 6 HOURS PRN
Status: DISCONTINUED | OUTPATIENT
Start: 2019-09-09 | End: 2019-09-10 | Stop reason: HOSPADM

## 2019-09-09 RX ORDER — ACETAMINOPHEN 325 MG/1
975 TABLET ORAL ONCE
Status: COMPLETED | OUTPATIENT
Start: 2019-09-09 | End: 2019-09-09

## 2019-09-09 RX ADMIN — ACETAMINOPHEN 975 MG: 325 TABLET ORAL at 19:11

## 2019-09-09 RX ADMIN — TRAMADOL HYDROCHLORIDE 50 MG: 50 TABLET, COATED ORAL at 21:16

## 2019-09-09 RX ADMIN — METOPROLOL SUCCINATE 50 MG: 50 TABLET, EXTENDED RELEASE ORAL at 21:16

## 2019-09-09 RX ADMIN — SODIUM CHLORIDE 75 ML/HR: 0.9 INJECTION, SOLUTION INTRAVENOUS at 20:58

## 2019-09-09 NOTE — ED PROVIDER NOTES
History  Chief Complaint   Patient presents with    Chest Pain     c/o substernal chest pain with fluttering feeling, dizziness, and diarrhea since 0700 this morning     47 y o  Female presents with chief complaint of chest pain and palpitations starting this morning at 7 am   Patient's pmh is significant for hypertension, atrial fibrillation, v-tach s/p AICD placement  Patient reports she has also had some diarrhea today  No fevers, chills, sob, nausea, vomiting or diaphoresis  History provided by:  Patient   used: No    Chest Pain   Pain location:  Substernal area  Pain quality: pressure    Pain radiates to:  Does not radiate  Pain radiates to the back: no    Pain severity:  Moderate  Onset quality:  Sudden  Duration:  9 hours  Timing:  Intermittent  Progression:  Waxing and waning  Chronicity:  Recurrent  Context: at rest    Relieved by:  Nothing  Ineffective treatments: metoprolol  Associated symptoms: diaphoresis and palpitations    Associated symptoms: no abdominal pain, no anxiety, no dizziness, no fever, no nausea, no numbness, no shortness of breath, not vomiting and no weakness    Risk factors: hypertension and smoking        Prior to Admission Medications   Prescriptions Last Dose Informant Patient Reported? Taking? amLODIPine (NORVASC) 10 mg tablet   Yes No   Sig: Take 10 mg by mouth daily  famotidine (PEPCID) 20 mg tablet   Yes No   Sig: Take 20 mg by mouth daily   furosemide (LASIX) 20 mg tablet   No No   Sig: Take 1 tablet (20 mg total) by mouth daily   lisinopril (ZESTRIL) 10 mg tablet   Yes No   Sig: Take 10 mg by mouth daily     metoprolol succinate (TOPROL-XL) 50 mg 24 hr tablet   No No   Sig: Take 1 tablet (50 mg total) by mouth every 12 (twelve) hours   pantoprazole (PROTONIX) 40 mg tablet   Yes No   pantoprazole (PROTONIX) 40 mg tablet   No No   Sig: take 1 tablet by mouth daily 30 MINUTES BEFORE BREAKFAST   rivaroxaban (XARELTO) 20 mg tablet  Self Yes No   Sig: Take 20 mg by mouth daily with breakfast   traMADol (ULTRAM) 50 mg tablet   Yes No   Sig: Take 50 mg by mouth every 6 (six) hours as needed for moderate pain      Facility-Administered Medications: None       Past Medical History:   Diagnosis Date    Arthritis     Atrial fibrillation (HCC)     Atrial fibrillation (HCC)     Breast lump     GERD (gastroesophageal reflux disease)     H/O transfusion 1987    Hepatitis C     resolved    Hepatitis C     Hyperlipidemia     Hypertension     Irregular heart beat     Pacemaker     Sleep apnea     no cpap       Past Surgical History:   Procedure Laterality Date    CARDIAC CATHETERIZATION  01/07/2019    CARDIAC DEFIBRILLATOR PLACEMENT      CARDIAC PACEMAKER PLACEMENT  2016    AFIB     CHOLECYSTECTOMY      COLONOSCOPY      ELBOW SURGERY      HYSTERECTOMY      HYSTERECTOMY      JOINT REPLACEMENT Left 2015    TKR    KNEE SURGERY Left     WI ESOPHAGOGASTRODUODENOSCOPY TRANSORAL DIAGNOSTIC N/A 5/2/2018    Procedure: ESOPHAGOGASTRODUODENOSCOPY (EGD); Surgeon: Franklin Roman MD;  Location: [facility]; Service: Gastroenterology    [place] LARYNGOSCOPY,DIRCT,[facility] N/A 8/10/2018    Procedure: MICRO DIRECT LARYNGOSCOPY , EXCISION OF POLYPS, KTP LASER;  Surgeon: Cara Maher MD;  [facility];  Service: ENT    REPLACEMENT TOTAL KNEE Left     THROAT SURGERY      polyps removed       Family History   Problem Relation Age of Onset    Arthritis Family     Cancer Family     Diabetes Family     Hypertension Family      I have reviewed and agree with the history as documented  Social History     Tobacco Use    Smoking status: Current Every Day Smoker     Packs/day: 0 25     Years: 0 00     Pack years: 0 00     Types: Cigarettes    Smokeless tobacco: Never Used    Tobacco comment: about 3 daily   Substance Use Topics    Alcohol use: No    Drug use: No        Review of Systems   Constitutional: Positive for diaphoresis   Negative for chills and fever  Respiratory: Negative for shortness of breath  Cardiovascular: Positive for chest pain and palpitations  Gastrointestinal: Negative for abdominal pain, diarrhea, nausea and vomiting  Genitourinary: Negative for dysuria and frequency  Skin: Negative for rash  Neurological: Negative for dizziness, weakness and numbness  All other systems reviewed and are negative  Physical Exam  Physical Exam   Constitutional: She is oriented to person, place, and time  She appears well-developed and well-nourished  She appears distressed  HENT:   Head: Normocephalic and atraumatic  Eyes: Pupils are equal, round, and reactive to light  EOM are normal    Neck: Normal range of motion  No JVD present  Cardiovascular: Normal rate and intact distal pulses  An irregular rhythm present  Exam reveals no gallop and no friction rub  Murmur heard  Systolic murmur is present with a grade of 4/6  Pulmonary/Chest: Effort normal and breath sounds normal  No respiratory distress  She has no wheezes  She has no rales  She exhibits no tenderness  Musculoskeletal: Normal range of motion  She exhibits no tenderness  Neurological: She is alert and oriented to person, place, and time  Skin: Skin is warm and dry  Psychiatric: She has a normal mood and affect  Her behavior is normal  Judgment and thought content normal    Nursing note and vitals reviewed        Vital Signs  ED Triage Vitals [09/09/19 1730]   Temperature Pulse Respirations Blood Pressure SpO2   98 4 °F (36 9 °C) 70 18 (!) 181/93 98 %      Temp Source Heart Rate Source Patient Position - Orthostatic VS BP Location FiO2 (%)   Oral Monitor Lying Right arm --      Pain Score       6           Vitals:    09/09/19 1745 09/09/19 1800 09/09/19 1845 09/09/19 1900   BP: 161/74 126/60 130/72 133/68   Pulse: 64 58 58 60   Patient Position - Orthostatic VS: Lying Lying Lying Lying         Visual Acuity  Visual Acuity      Most Recent Value   L Pupil Size (mm)  3   R Pupil Size (mm)  3          ED Medications  Medications   acetaminophen (TYLENOL) tablet 975 mg (975 mg Oral Given 9/9/19 1911)       Diagnostic Studies  Results Reviewed     Procedure Component Value Units Date/Time    Rapid drug screen, urine [[id number]]  (Normal) Collected:  09/09/19 1830    Lab Status:  Final result Specimen:  Urine, Clean Catch Updated:  09/09/19 1859     Amph/Meth UR Negative     Barbiturate Ur Negative     Benzodiazepine Urine Negative     Cocaine Urine Negative     Methadone Urine Negative     Opiate Urine Negative     PCP Ur Negative     THC Urine Negative    Narrative:       FOR MEDICAL PURPOSES ONLY  IF CONFIRMATION NEEDED PLEASE CONTACT THE LAB WITHIN 5 DAYS      Drug Screen Cutoff Levels:  AMPHETAMINE/METHAMPHETAMINES  1000 ng/mL  BARBITURATES     200 ng/mL  BENZODIAZEPINES     200 ng/mL  COCAINE      300 ng/mL  METHADONE      300 ng/mL  OPIATES      300 ng/mL  PHENCYCLIDINE     25 ng/mL  THC       50 ng/mL      Ethanol [[id number]]  (Normal) Collected:  09/09/19 1752    Lab Status:  Final result Specimen:  Blood from Arm, Left Updated:  09/09/19 1823     Ethanol Lvl <3 mg/dL     Troponin I [[id number]]  (Abnormal) Collected:  09/09/19 1752    Lab Status:  Final result Specimen:  Blood from Arm, Left Updated:  09/09/19 1818     Troponin I 0 05 ng/mL     Comprehensive metabolic panel [[id number]]  (Abnormal) Collected:  09/09/19 1752    Lab Status:  Final result Specimen:  Blood from Arm, Left Updated:  09/09/19 1816     Sodium 141 mmol/L      Potassium 3 7 mmol/L      Chloride 105 mmol/L      CO2 27 mmol/L      ANION GAP 9 mmol/L      BUN 16 mg/dL      Creatinine 2 00 mg/dL      Glucose 126 mg/dL      Calcium 9 0 mg/dL      AST 23 U/L      ALT 27 U/L      Alkaline Phosphatase 69 U/L      Total Protein 7 8 g/dL      Albumin 3 9 g/dL      Total Bilirubin 0 30 mg/dL      eGFR 28 ml/min/1 73sq m     Narrative:       [place] guidelines for Chronic Kidney Disease (CKD):     Stage 1 with normal or high GFR (GFR > 90 mL/min/1 73 square meters)    Stage 2 Mild CKD (GFR = 60-89 mL/min/1 73 square meters)    Stage 3A Moderate CKD (GFR = 45-59 mL/min/1 73 square meters)    Stage 3B Moderate CKD (GFR = 30-44 mL/min/1 73 square meters)    Stage 4 Severe CKD (GFR = 15-29 mL/min/1 73 square meters)    Stage 5 End Stage CKD (GFR <15 mL/min/1 73 square meters)  Note: GFR calculation is accurate only with a steady state creatinine    CBC and differential [[id number]]  (Abnormal) Collected:  09/09/19 1752    Lab Status:  Final result Specimen:  Blood from Arm, Left Updated:  09/09/19 1813     WBC 6 49 Thousand/uL      RBC 4 31 Million/uL      Hemoglobin 11 8 g/dL      Hematocrit 37 4 %      MCV 87 fL      MCH 27 4 pg      MCHC 31 6 g/dL      RDW 14 0 %      MPV 11 3 fL      Platelets 041 Thousands/uL      nRBC 0 /100 WBCs      Neutrophils Relative 70 %      Immat GRANS % 0 %      Lymphocytes Relative 23 %      Monocytes Relative 6 %      Eosinophils Relative 1 %      Basophils Relative 0 %      Neutrophils Absolute 4 53 Thousands/µL      Immature Grans Absolute 0 01 Thousand/uL      Lymphocytes Absolute 1 46 Thousands/µL      Monocytes Absolute 0 41 Thousand/µL      Eosinophils Absolute 0 07 Thousand/µL      Basophils Absolute 0 01 Thousands/µL                  XR chest 2 views   ED Interpretation by Madison Driscoll MD (09/09 1922)   This film was interpreted independently by me  No infiltrate, cardiac silhouette normal, no pleural effusions or pulmonary edema                    Procedures  ECG 12 Lead Documentation Only  Date/Time: 9/9/2019 5:39 PM  Performed by: Madison Driscoll MD  Authorized by: Madison Driscoll MD     Indications / Diagnosis:  Chest pain and palpitations  ECG reviewed by me, the ED Provider: yes    Patient location:  ED  Previous ECG:     Previous ECG:  Compared to current    Comparison ECG info:  12/26/2018    Similarity:  Changes noted (not in atrial fibrillation this time)  Interpretation:     Interpretation: abnormal    Rate:     ECG rate:  81    ECG rate assessment: normal    Rhythm:     Rhythm: sinus rhythm    Ectopy:     Ectopy comment:  A run of 4 beats of ventricular tachycardia  QRS:     QRS axis:  Normal  Conduction:     Conduction: abnormal      Abnormal conduction: complete RBBB    T waves:     T waves: inverted      Inverted:  I and aVL           ED Course         HEART Risk Score      Most Recent Value   History  1 Filed at: 09/09/2019 1839   ECG  1 Filed at: 09/09/2019 1839   Age  1 Filed at: 09/09/2019 1839   Risk Factors  1 Filed at: 09/09/2019 1839   Troponin  1 Filed at: 09/09/2019 1839   Heart Score Risk Calculator   History  1 Filed at: 09/09/2019 1839   ECG  1 Filed at: 09/09/2019 1839   Age  1 Filed at: 09/09/2019 1839   Risk Factors  1 Filed at: 09/09/2019 1839   Troponin  1 Filed at: 09/09/2019 1839   HEART Score  5 Filed at: 09/09/2019 1839   HEART Score  5 Filed at: 09/09/2019 1839                            MDM  Number of Diagnoses or Management Options  Chest pain: new and requires workup  Palpitations: new and requires workup  Diagnosis management comments: Background: 47 y o  female with chest pain and palpitations    Differential DX includes but is not limited to: acs/mi, pe, pleurisy, atrial fibrillation, v-tach, dissection, pneumonia, musculoskeletal chest pain    Plan: cardiac workup         Amount and/or Complexity of Data Reviewed  Clinical lab tests: ordered and reviewed  Tests in the radiology section of CPT®: ordered and reviewed  Independent visualization of images, tracings, or specimens: yes    Risk of Complications, Morbidity, and/or Mortality  Presenting problems: high  Diagnostic procedures: high  Management options: high    Patient Progress  Patient progress: stable      Disposition  Final diagnoses:   Chest pain   Palpitations     Time reflects when diagnosis was documented in both MDM as applicable and the Disposition within this note     Time User Action Codes Description Comment    9/9/2019  7:30 PM Tomma Night Add [R07 9] Chest pain     9/9/2019  7:30 PM Tomma Night Add [R00 2] Palpitations       ED Disposition     ED Disposition Condition Date/Time Comment    Admit Stable Mon Sep 9, 2019  7:29 PM Case was discussed with Dr Abimbola Godfrey and the patient's admission status was agreed to be Admission Status: observation status to the service of Dr Abimbola Godfrey  Follow-up Information    None         Patient's Medications   Discharge Prescriptions    No medications on file     No discharge procedures on file      ED Provider  Electronically Signed by           Rafita Snider MD  09/09/19 4761

## 2019-09-09 NOTE — ED NOTES
Pt ambulated to restroom by self with negative complications  Steady gait noted  Clean catch UA sample collected       Vicki Loo RN  09/09/19 9691

## 2019-09-10 VITALS
HEART RATE: 58 BPM | SYSTOLIC BLOOD PRESSURE: 142 MMHG | HEIGHT: 67 IN | DIASTOLIC BLOOD PRESSURE: 84 MMHG | TEMPERATURE: 99.1 F | WEIGHT: 231.04 LBS | OXYGEN SATURATION: 98 % | RESPIRATION RATE: 18 BRPM | BODY MASS INDEX: 36.26 KG/M2

## 2019-09-10 LAB
ANION GAP SERPL CALCULATED.3IONS-SCNC: 10 MMOL/L (ref 4–13)
ATRIAL RATE: 67 BPM
BUN SERPL-MCNC: 18 MG/DL (ref 5–25)
CALCIUM SERPL-MCNC: 8.8 MG/DL (ref 8.3–10.1)
CHLORIDE SERPL-SCNC: 109 MMOL/L (ref 100–108)
CO2 SERPL-SCNC: 24 MMOL/L (ref 21–32)
CREAT SERPL-MCNC: 1.71 MG/DL (ref 0.6–1.3)
GFR SERPL CREATININE-BSD FRML MDRD: 33 ML/MIN/1.73SQ M
GLUCOSE P FAST SERPL-MCNC: 101 MG/DL (ref 65–99)
GLUCOSE SERPL-MCNC: 101 MG/DL (ref 65–140)
P AXIS: 71 DEGREES
POTASSIUM SERPL-SCNC: 3.8 MMOL/L (ref 3.5–5.3)
PR INTERVAL: 140 MS
QRS AXIS: -32 DEGREES
QRSD INTERVAL: 156 MS
QT INTERVAL: 422 MS
QTC INTERVAL: 490 MS
SODIUM SERPL-SCNC: 143 MMOL/L (ref 136–145)
T WAVE AXIS: 109 DEGREES
TROPONIN I SERPL-MCNC: 0.06 NG/ML
TROPONIN I SERPL-MCNC: 0.08 NG/ML
VENTRICULAR RATE: 81 BPM

## 2019-09-10 PROCEDURE — 93010 ELECTROCARDIOGRAM REPORT: CPT | Performed by: INTERNAL MEDICINE

## 2019-09-10 PROCEDURE — 84484 ASSAY OF TROPONIN QUANT: CPT | Performed by: PHYSICIAN ASSISTANT

## 2019-09-10 PROCEDURE — 99217 PR OBSERVATION CARE DISCHARGE MANAGEMENT: CPT | Performed by: INTERNAL MEDICINE

## 2019-09-10 PROCEDURE — 99215 OFFICE O/P EST HI 40 MIN: CPT | Performed by: INTERNAL MEDICINE

## 2019-09-10 PROCEDURE — 80048 BASIC METABOLIC PNL TOTAL CA: CPT | Performed by: PHYSICIAN ASSISTANT

## 2019-09-10 RX ORDER — NICOTINE 21 MG/24HR
1 PATCH, TRANSDERMAL 24 HOURS TRANSDERMAL DAILY
Qty: 28 PATCH | Refills: 0 | Status: SHIPPED | OUTPATIENT
Start: 2019-09-11 | End: 2020-04-18

## 2019-09-10 RX ORDER — POTASSIUM CHLORIDE 20 MEQ/1
20 TABLET, EXTENDED RELEASE ORAL ONCE
Status: COMPLETED | OUTPATIENT
Start: 2019-09-10 | End: 2019-09-10

## 2019-09-10 RX ORDER — SODIUM CHLORIDE 9 MG/ML
75 INJECTION, SOLUTION INTRAVENOUS CONTINUOUS
Status: DISCONTINUED | OUTPATIENT
Start: 2019-09-10 | End: 2019-09-10 | Stop reason: HOSPADM

## 2019-09-10 RX ADMIN — RIVAROXABAN 20 MG: 20 TABLET, FILM COATED ORAL at 06:59

## 2019-09-10 RX ADMIN — METOPROLOL SUCCINATE 50 MG: 50 TABLET, EXTENDED RELEASE ORAL at 08:10

## 2019-09-10 RX ADMIN — FAMOTIDINE 20 MG: 20 TABLET ORAL at 08:10

## 2019-09-10 RX ADMIN — TRAMADOL HYDROCHLORIDE 50 MG: 50 TABLET, COATED ORAL at 06:59

## 2019-09-10 RX ADMIN — ACETAMINOPHEN 650 MG: 325 TABLET, FILM COATED ORAL at 08:13

## 2019-09-10 RX ADMIN — NICOTINE 1 PATCH: 14 PATCH TRANSDERMAL at 08:10

## 2019-09-10 RX ADMIN — AMLODIPINE BESYLATE 10 MG: 10 TABLET ORAL at 08:10

## 2019-09-10 RX ADMIN — PANTOPRAZOLE SODIUM 40 MG: 40 TABLET, DELAYED RELEASE ORAL at 05:25

## 2019-09-10 RX ADMIN — SODIUM CHLORIDE 75 ML/HR: 0.9 INJECTION, SOLUTION INTRAVENOUS at 10:30

## 2019-09-10 RX ADMIN — TRAMADOL HYDROCHLORIDE 50 MG: 50 TABLET, COATED ORAL at 16:02

## 2019-09-10 RX ADMIN — POTASSIUM CHLORIDE 20 MEQ: 1500 TABLET, EXTENDED RELEASE ORAL at 12:14

## 2019-09-10 NOTE — ASSESSMENT & PLAN NOTE
· POA, troponin noted at 0 05  Has been higher in the past when patient was in A  Fib with RVR  Prior catheterization in January 2019 was clean   Overall I suspect that this is elevated secondary to PVC burden and poor renal clearance   · Trend troponin   · Telemetry   · Cardiology consultation   · Treat DANIELA with IVF

## 2019-09-10 NOTE — DISCHARGE SUMMARY
Discharge- Cathern Kussmaul 1965, 47 y o  female MRN: [id number]    Unit/Bed#: -01 Encounter: [id number]    Primary Care Provider: Maurice Albrecht MD   Date and time admitted to hospital: 9/9/2019  5:22 PM        * Palpitations  Assessment & Plan  · Per discussion with [employer] rep no significant signs of April admitted because patient's symptoms of palpitations  · Patient anxious for discharge home  · After discussion with Cardiology will discharge home with no further change in medications  · Close outpatient follow-up    Ventricular tachycardia ([address] )  Assessment & Plan  · No V-Tach noted on EKG, just PVCs  · No V-tach noted during hospitalization or significant ventricular arrhythmia burden on pacer interrogation    Elevated troponin  Assessment & Plan  · Chronically elevated troponin of non clinical significance    Essential hypertension  Assessment & Plan  · Continue blood pressure treatment on discharge        Discharging Physician / Practitioner: Charlesetta Kehr, MD  PCP: Maurice Albrecht MD  Admission Date:   Admission Orders (From admission, onward)     Ordered        09/09/19 1930  Place in Observation (expected length of stay for this patient is less than two midnights)  Once                   Discharge Date: 09/10/19    Resolved Problems  Date Reviewed: 9/10/2019    None          Consultations During Hospital Stay:  · Cardiology    Procedures Performed:   · Pacemaker interrogation  · Chest x-ray no acute cardiopulmonary disease    Significant Findings / Test Results:   · None    Incidental Findings:   · None     Test Results Pending at Discharge (will require follow up): · None     Outpatient Tests Requested:  · None    Complications:  None    Reason for Admission:  Palpitations    Hospital Course:     Cathern Kussmaul is a 47 y o  female patient who originally presented to the hospital on 9/9/2019 due to palpitations  Patient with extensive cardiac history with current ICD placed  Came to hospital with complaints of palpitations throughout the day  She is admitted placed on telemetry seen by Cardiology  Pacer interrogation was unremarkable  Patient noted to have a chronically elevated troponin with no further workup indicated per discussion with Cardiology  Patient also with elevated creatinine noted to be improved with labs near recent baseline of 1 54-1 67 creatinine of 1 71 on discharge patient advised to be follow-up with PCP within next week of discharge repeat renal function further management  Patient of eyes to hold Lasix and Zestril wounds was seen by PCP within next 2-3 days  Please see above list of diagnoses and related plan for additional information  Condition at Discharge: good     Discharge Day Visit / Exam:     Subjective:  I am going home today  Vitals: Blood Pressure: 142/84 (09/10/19 1554)  Pulse: 58 (09/10/19 1554)  Temperature: 99 1 °F (37 3 °C) (09/10/19 1554)  Temp Source: Oral (09/10/19 1554)  Respirations: 18 (09/10/19 1554)  Height: 5' 7" (170 2 cm) (09/10/19 1036)  Weight - Scale: 105 kg (231 lb 0 7 oz) (09/09/19 2100)  SpO2: 98 % (09/10/19 1554)  Exam:   Physical Exam   Constitutional: She is oriented to person, place, and time  HENT:   Head: Normocephalic and atraumatic  Cardiovascular: Normal rate, regular rhythm and normal heart sounds  Exam reveals no gallop and no friction rub  No murmur heard  Pulmonary/Chest: Effort normal and breath sounds normal  No stridor  No respiratory distress  She has no wheezes  She has no rales  Abdominal: Soft  Bowel sounds are normal  She exhibits no distension and no mass  There is no tenderness  There is no guarding  Neurological: She is alert and oriented to person, place, and time  Skin: Skin is warm and dry       Discussion with Family:  Patient's  at bedside    Discharge instructions/Information to patient and family:   See after visit summary for information provided to patient and family  Provisions for Follow-Up Care:  See after visit summary for information related to follow-up care and any pertinent home health orders  Disposition:     Home    For Discharges to [facility] SNF:   · Not Applicable to this Patient - Not Applicable to this Patient    Planned Readmission: no     Discharge Statement:  I spent 30 minutes discharging the patient  This time was spent on the day of discharge  I had direct contact with the patient on the day of discharge  Greater than 50% of the total time was spent examining patient, answering all patient questions, arranging and discussing plan of care with patient as well as directly providing post-discharge instructions  Additional time then spent on discharge activities  Discharge Medications:  See after visit summary for reconciled discharge medications provided to patient and family        ** Please Note: This note has been constructed using a voice recognition system **

## 2019-09-10 NOTE — ASSESSMENT & PLAN NOTE
· No V-Tach noted on EKG, just PVCs  · No V-tach noted during hospitalization or significant ventricular arrhythmia burden on pacer interrogation

## 2019-09-10 NOTE — H&P
[address] [facility]  H&P- Amina Clipper 1965, 47 y o  female MRN: [id number]    Unit/Bed#: ED 15 Encounter: [id number]    Primary Care Provider: Kendy Lao MD   Date and time admitted to hospital: 9/9/2019  5:22 PM        * Palpitations  Assessment & Plan  · Patient reports subjective palpitations throughout the day starting last night  Has had some dizziness throughout the day as well  Mild chest pain, but the palpitations were more concerning to patient  No sensation that ICD discharged - she has felt this in the past  Recent device interrogation in August showed 3 episodes of AF and 1 SVT on EGM  Troponin elevation noted  EKG significant for PVCs   · Admit patient to med/surg under observation status   · Consult cardiology   · Telemetry   · Interrogate pacemaker   · Trend troponin   · Check TSH  · Trend K, Mg       Elevated troponin  Assessment & Plan  · POA, troponin noted at 0 05  Has been higher in the past when patient was in A  Fib with RVR  Prior catheterization in January 2019 was clean  Overall I suspect that this is elevated secondary to PVC burden and poor renal clearance   · Trend troponin   · Telemetry   · Cardiology consultation   · Treat DANIELA with IVF    DANIELA (acute kidney injury) ([facility] [address] )  Assessment & Plan  · Baseline appears to be 1 3-1 6  Elevated at 2 00 today  Reports that she was packing and moving items over the weekend and was taking her Lasix  I suspect due to dehydration  Possibly some element of cardiorenal syndrome depending on her arrhythmia burden   · IVF overnight   · Avoid nephrotoxins   · Lasix, Lisinopril Held   · Avoid hypotension   · Coreg and Norvasc parameters changed to be held for SBP < 130 mmHg  · Check BMP in AM    Ventricular tachycardia (HCC)  Assessment & Plan  · No V-Tach noted on EKG, just PVCs  · Monitor on telemetry   · Interrogate pacemaker     Atrial fibrillation [facility])  Assessment & Plan  · Patient appears to be in sinus rhythm at this time  History of Pacemaker/ICD implantation  Occasional and consecutive PVCs noted on EKG in ER  She has been feeling palpitations throughout the day at random times   · Monitor on telemetry   · Interrogate pacemaker   · Check TSH  · Check Magnesium   · Continue Metoprolol   · Hold parameters ordered given DANIELA   · Cardiology consult as per primary     Essential hypertension  Assessment & Plan  · BP acceptable on admission   · Continue Norvasc and Coreg with hold parameters  · Holding Lisinopril and Lasix in the setting of DANIELA         VTE Prophylaxis: Rivaroxaban (Xarelto)  / reason for no mechanical VTE prophylaxis None needed as per VTE protocol    Code Status: Full Code   POLST: POLST form is not discussed and not completed at this time  Discussion with family: Sister at bedside     Anticipated Length of Stay:  Patient will be admitted on an Observation basis with an anticipated length of stay of  Less than 2 midnights  Justification for Hospital Stay: As per assessment and plan     Total Time for Visit, including Counseling / Coordination of Care: 1 hour  Greater than 50% of this total time spent on direct patient counseling and coordination of care  Chief Complaint:   Palpitations    History of Present Illness:    Cathern Kussmaul is a 47 y o  female with a history of A  Fib, HCM, V-Tach, and HTN who presents with palpitations since last night  First noted symptoms of palpitations starting last night  States this progressed into today  She states that she felt palpitations at random times with varying levels of activity  She denies them being only related to exertional symptoms  She does report some very mild central chest pain, but this was unconcerning to the patient  She reports intermittent dizziness, and states that this was not associated with palpitations  She denies any loss of consciousness  She denies any shortness of breath or leg swelling    She denies any sensation of her ICD firing and she reports that this has happened in the past and she did not feel anything like that at this time  She denies any diaphoresis  The patient states that she was packing boxes and moving things all weekend that she is planning on moving out of her house  She states she was still taking her Lasix throughout the weekend  She states that she has been drinking normally, but states that her appetite is slightly decreased as she gets nauseous after she eats  She denies any vomiting or diarrhea  She denies any urinary complaints  Patient reports she has been compliant with her other medications  She denies any alcohol or drug use over the weekend  Review of Systems:    Review of Systems   Constitutional: Negative for appetite change, chills, diaphoresis, fatigue and fever  HENT: Negative for congestion, rhinorrhea and sore throat  Eyes: Negative for visual disturbance  Respiratory: Negative for cough, chest tightness, shortness of breath and wheezing  Cardiovascular: Positive for chest pain and palpitations  Negative for leg swelling  Gastrointestinal: Positive for nausea  Negative for abdominal pain, constipation, diarrhea and vomiting  Genitourinary: Negative for dysuria  Musculoskeletal: Negative for arthralgias and myalgias  Neurological: Positive for dizziness  Negative for syncope, weakness, light-headedness, numbness and headaches  All other systems reviewed and are negative        Past Medical and Surgical History:     Past Medical History:   Diagnosis Date    Arthritis     Atrial fibrillation ([facility] [address] )     Atrial fibrillation (HCC)     Breast lump     GERD (gastroesophageal reflux disease)     H/O transfusion 1987    Hepatitis C     resolved    Hepatitis C     Hyperlipidemia     Hypertension     Irregular heart beat     Pacemaker     Sleep apnea     no cpap       Past Surgical History:   Procedure Laterality Date    CARDIAC CATHETERIZATION  01/07/2019    CARDIAC DEFIBRILLATOR PLACEMENT      CARDIAC PACEMAKER PLACEMENT  2016    AFIB     CHOLECYSTECTOMY      COLONOSCOPY      ELBOW SURGERY      HYSTERECTOMY      HYSTERECTOMY      JOINT REPLACEMENT Left 2015    TKR    KNEE SURGERY Left     [place] ESOPHAGOGASTRODUODENOSCOPY TRANSORAL DIAGNOSTIC N/A 5/2/2018    Procedure: ESOPHAGOGASTRODUODENOSCOPY (EGD); Surgeon: Samreen Guerrero MD;  Location: [facility]; Service: Gastroenterology    [place] LARYNGOSCOPY,DIRCT,OP [facility] TUMR N/A 8/10/2018    Procedure: MICRO DIRECT LARYNGOSCOPY , EXCISION OF POLYPS, KTP LASER;  Surgeon: Antwon Giraldo MD;  Location[facility];  Service: ENT    REPLACEMENT TOTAL KNEE Left     THROAT SURGERY      polyps removed       Meds/Allergies:    Prior to Admission medications    Medication Sig Start Date End Date Taking? Authorizing Provider   amLODIPine (NORVASC) 10 mg tablet Take 10 mg by mouth daily  Historical Provider, MD   famotidine (PEPCID) 20 mg tablet Take 20 mg by mouth daily    Historical Provider, MD   furosemide (LASIX) 20 mg tablet Take 1 tablet (20 mg total) by mouth daily 7/17/19   Eder Harris MD   lisinopril (ZESTRIL) 10 mg tablet Take 10 mg by mouth daily  Historical Provider, MD   metoprolol succinate (TOPROL-XL) 50 mg 24 hr tablet Take 1 tablet (50 mg total) by mouth every 12 (twelve) hours 12/27/18   Adilson Leone PA-C   pantoprazole (PROTONIX) 40 mg tablet  1/8/19   Historical Provider, MD   pantoprazole (PROTONIX) 40 mg tablet take 1 tablet by mouth daily 30 MINUTES BEFORE BREAKFAST 8/12/19   Antwon Giraldo MD   rivaroxaban (XARELTO) 20 mg tablet Take 20 mg by mouth daily with breakfast    Historical Provider, MD   traMADol (ULTRAM) 50 mg tablet Take 50 mg by mouth every 6 (six) hours as needed for moderate pain    Historical Provider, MD     I have reviewed home medications with patient personally  Allergies:    Allergies   Allergen Reactions    Iodinated Diagnostic Agents Hives    Tape  [Medical Tape] Hives       Social History:     Marital Status: /Civil Union   Occupation: Noncontributory   Patient Pre-hospital Living Situation: Home  Patient Pre-hospital Level of Mobility: Full  Patient Pre-hospital Diet Restrictions: None  Substance Use History:   Social History     Substance and Sexual Activity   Alcohol Use No     Social History     Tobacco Use   Smoking Status Current Every Day Smoker    Packs/day: 0 25    Years: 0 00    Pack years: 0 00    Types: Cigarettes   Smokeless Tobacco Never Used   Tobacco Comment    about 3 daily     Social History     Substance and Sexual Activity   Drug Use No       Family History:    Family History   Problem Relation Age of Onset    Arthritis Family     Cancer Family     Diabetes Family     Hypertension Family        Physical Exam:     Vitals:   Blood Pressure: 133/68 (09/09/19 1900)  Pulse: 60 (09/09/19 1900)  Temperature: 98 4 °F (36 9 °C) (09/09/19 1730)  Temp Source: Oral (09/09/19 1730)  Respirations: 18 (09/09/19 1900)  Height: 5' 7" (170 2 cm) (09/09/19 1730)  Weight - Scale: 105 kg (231 lb 14 8 oz) (09/09/19 1730)  SpO2: 98 % (09/09/19 1900)    Physical Exam   Constitutional: She is oriented to person, place, and time  Vital signs are normal  She appears well-developed and well-nourished  Non-toxic appearance  No distress  HENT:   Head: Normocephalic and atraumatic  Mouth/Throat: Mucous membranes are dry  Eyes: Pupils are equal, round, and reactive to light  Conjunctivae and EOM are normal  No scleral icterus  Pupils are equal    Neck: Neck supple  Cardiovascular: Normal rate, regular rhythm, S1 normal and intact distal pulses  Occasional extrasystoles are present  Exam reveals no S3 and no S4  Murmur heard  Systolic murmur is present with a grade of 3/6  Pulmonary/Chest: Effort normal and breath sounds normal  No accessory muscle usage or stridor  No respiratory distress  She has no decreased breath sounds  She has no wheezes  She has no rhonchi   She has no rales  She exhibits no tenderness  Abdominal: Soft  Bowel sounds are normal  She exhibits no distension and no mass  There is no tenderness  There is no rigidity, no rebound and no guarding  Neurological: She is alert and oriented to person, place, and time  She is not disoriented  GCS eye subscore is 4  GCS verbal subscore is 5  GCS motor subscore is 6  Skin: Skin is warm and dry  Additional Data:     Lab Results: I have personally reviewed pertinent reports  Results from last 7 days   Lab Units 09/09/19  1752   WBC Thousand/uL 6 49   HEMOGLOBIN g/dL 11 8   HEMATOCRIT % 37 4   PLATELETS Thousands/uL 137*   NEUTROS PCT % 70   LYMPHS PCT % 23   MONOS PCT % 6   EOS PCT % 1     Results from last 7 days   Lab Units 09/09/19  1752   SODIUM mmol/L 141   POTASSIUM mmol/L 3 7   CHLORIDE mmol/L 105   CO2 mmol/L 27   BUN mg/dL 16   CREATININE mg/dL 2 00*   ANION GAP mmol/L 9   CALCIUM mg/dL 9 0   ALBUMIN g/dL 3 9   TOTAL BILIRUBIN mg/dL 0 30   ALK PHOS U/L 69   ALT U/L 27   AST U/L 23   GLUCOSE RANDOM mg/dL 126                       Imaging: I have personally reviewed pertinent reports  XR chest 2 views   ED Interpretation by Elena Gómez MD (09/09 1922)   This film was interpreted independently by me  No infiltrate, cardiac silhouette normal, no pleural effusions or pulmonary edema  EKG, Pathology, and Other Studies Reviewed on Admission:   · EKG: Sinus Rhythm with Occasional and Consecutive PVCs, 81 BPM  · CXR: No acute pulmonary disease, pacemaker noted, my personal read     [facility] Records Reviewed: Yes     ** Please Note: This note has been constructed using a voice recognition system   **

## 2019-09-10 NOTE — PLAN OF CARE
Problem: Nutrition/Hydration-ADULT  Goal: Nutrient/Hydration intake appropriate for improving, restoring or maintaining nutritional needs  Description  Monitor and assess patient's nutrition/hydration status for malnutrition  Collaborate with interdisciplinary team and initiate plan and interventions as ordered  Monitor patient's weight and dietary intake as ordered or per policy  Utilize nutrition screening tool and intervene as necessary  Determine patient's food preferences and provide high-protein, high-caloric foods as appropriate       INTERVENTIONS:  - Monitor oral intake, urinary output, labs, and treatment plans  - Assess nutrition and hydration status and recommend course of action  - Evaluate amount of meals eaten  - Assist patient with eating if necessary   - Allow adequate time for meals  - Recommend/ encourage appropriate diets, oral nutritional supplements, and vitamin/mineral supplements  - Order, calculate, and assess calorie counts as needed  - Recommend, monitor, and adjust tube feedings and TPN/PPN based on assessed needs  - Assess need for intravenous fluids  - Provide specific nutrition/hydration education as appropriate  - Include patient/family/caregiver in decisions related to nutrition  Outcome: Progressing     Problem: CARDIOVASCULAR - ADULT  Goal: Maintains optimal cardiac output and hemodynamic stability  Description  INTERVENTIONS:  - Monitor I/O, vital signs and rhythm  - Monitor for S/S and trends of decreased cardiac output  - Administer and titrate ordered vasoactive medications to optimize hemodynamic stability  - Assess quality of pulses, skin color and temperature  - Assess for signs of decreased coronary artery perfusion  - Instruct patient to report change in severity of symptoms  Outcome: Progressing  Goal: Absence of cardiac dysrhythmias or at baseline rhythm  Description  INTERVENTIONS:  - Continuous cardiac monitoring, vital signs, obtain 12 lead EKG if ordered  - Administer antiarrhythmic and heart rate control medications as ordered  - Monitor electrolytes and administer replacement therapy as ordered  Outcome: Progressing     Problem: METABOLIC, FLUID AND ELECTROLYTES - ADULT  Goal: Fluid balance maintained  Description  INTERVENTIONS:  - Monitor labs   - Monitor I/O and WT  - Instruct patient on fluid and nutrition as appropriate  - Assess for signs & symptoms of volume excess or deficit  Outcome: Progressing     Problem: PAIN - ADULT  Goal: Verbalizes/displays adequate comfort level or baseline comfort level  Description  Interventions:  - Encourage patient to monitor pain and request assistance  - Assess pain using appropriate pain scale  - Administer analgesics based on type and severity of pain and evaluate response  - Implement non-pharmacological measures as appropriate and evaluate response  - Consider cultural and social influences on pain and pain management  - Notify physician/advanced practitioner if interventions unsuccessful or patient reports new pain  Outcome: Progressing     Problem: SAFETY ADULT  Goal: Patient will remain free of falls  Description  INTERVENTIONS:  - Assess patient frequently for physical needs  -  Identify cognitive and physical deficits and behaviors that affect risk of falls    -  [place] fall precautions as indicated by assessment   - Educate patient/family on patient safety including physical limitations  - Instruct patient to call for assistance with activity based on assessment  - Modify environment to reduce risk of injury  - Consider OT/PT consult to assist with strengthening/mobility  Outcome: Progressing     Problem: DISCHARGE PLANNING  Goal: Discharge to home or other facility with appropriate resources  Description  INTERVENTIONS:  - Identify barriers to discharge w/patient and caregiver  - Arrange for needed discharge resources and transportation as appropriate  - Identify discharge learning needs (meds, wound care, etc )  - Arrange for interpretive services to assist at discharge as needed  - Refer to Case Management Department for coordinating discharge planning if the patient needs post-hospital services based on physician/advanced practitioner order or complex needs related to functional status, cognitive ability, or social support system  Outcome: Progressing     Problem: Knowledge Deficit  Goal: Patient/family/caregiver demonstrates understanding of disease process, treatment plan, medications, and discharge instructions  Description  Complete learning assessment and assess knowledge base    Interventions:  - Provide teaching at level of understanding  - Provide teaching via preferred learning methods  Outcome: Progressing

## 2019-09-10 NOTE — DISCHARGE INSTRUCTIONS
· Please hold her Lasix and lisinopril until you see your family doctor    · See your family doctor within the next 2-3 days

## 2019-09-10 NOTE — ASSESSMENT & PLAN NOTE
· Per discussion with [employer] rep no significant signs of April admitted because patient's symptoms of palpitations    · Patient anxious for discharge home  · After discussion with Cardiology will discharge home with no further change in medications  · Close outpatient follow-up

## 2019-09-10 NOTE — UTILIZATION REVIEW
Initial Clinical Review    Admission: Date/Time/Statement: Observation 9/9/19 @1930  Orders Placed This Encounter   Procedures    Place in Observation (expected length of stay for this patient is less than two midnights)     Standing Status:   Standing     Number of Occurrences:   1     Order Specific Question:   Admitting Physician     Answer:   Josh Briseno     Order Specific Question:   Level of Care     Answer:   Med Surg [16]     ED Arrival Information     Expected Arrival Acuity Means of Arrival Escorted By Service Admission Type    - 9/9/2019 17:16 Emergent Walk-In Self Hospitalist Emergency    Arrival Complaint    chest pain        Chief Complaint   Patient presents with    Chest Pain     c/o substernal chest pain with fluttering feeling, dizziness, and diarrhea since 0700 this morning     Assessment/Plan: 47year old female to the ED from home with complaints of chest pain, dizziness for 8 hours prior to arrival   Admitted under observation for palpitations and elevated troponin  She has an ICd  Found to have elvated troponin in ED  Cards consult  Irregularly irregular heart rhythm with murmur  Interrogation of icd on Aug 3 showed AF and 1 episode of SVT  Check TSH  PVC s noted on EKG done in ED  Sustpect eelvated Troponin is secondary to PVC burden and poor renal clearance  Baseline cre 1 3-1 6, today it's 2  She had a busy weekend, suspect some dehydration  IV fluids  Avoid hypotension  Having intermittent dizziness not associated with palpitations  Cardiology consult 9/10: She is really not having palpitations since on telemetry on the floor but was having them the more so at home and in the emergency room  Troponin appears to be chronically elevated  On betablocker and xarelto for stroke prophy for afib  Hold lasix  Replete potassium to 4  Medication changes to be determined pending pacer interrogation       ED Triage Vitals [09/09/19 1730]   Temperature Pulse Respirations Blood Pressure SpO2   98 4 °F (36 9 °C) 70 18 (!) 181/93 98 %      Temp Source Heart Rate Source Patient Position - Orthostatic VS BP Location FiO2 (%)   Oral Monitor Lying Right arm --      Pain Score       6        Wt Readings from Last 1 Encounters:   09/09/19 105 kg (231 lb 0 7 oz)     Additional Vital Signs:  Date/Time  Temp  Pulse  Resp  BP  SpO2  O2 Device  Patient Position - Orthostatic VS   09/10/19 0700  98 5 °F (36 9 °C)  60  18  147/78  99 %  None (Room air)  Lying   09/09/19 2300    60  16  159/70  95 %    Lying   09/09/19 2100  98 6 °F (37 °C)  61  16  165/80  99 %    Lying   09/09/19 2027    61  16  159/77  98 %  None (Room air)  Lying   09/09/19 1900    60  18  133/68  98 %    Lying   09/09/19 1845    58    130/72  98 %  None (Room air)  Lying   09/09/19 1800    58  19  126/60  97 %  None (Room air)  Lying   09/09/19 1745    64  18  161/74           Pertinent Labs/Diagnostic Test Results  CXR:  No acute cardiopulmonary disease  EKG: Interpretation:     Interpretation: abnormal    Rate:     ECG rate:  81    ECG rate assessment: normal    Rhythm:     Rhythm: sinus rhythm    Ectopy:     Ectopy comment:  A run of 4 beats of ventricular tachycardia  QRS:     QRS axis:  Normal  Conduction:     Conduction: abnormal      Abnormal conduction: complete RBBB    T waves:     T waves: inverted      Inverted:  I and aVL     Results from last 7 days   Lab Units 09/09/19  1752   WBC Thousand/uL 6 49   HEMOGLOBIN g/dL 11 8   HEMATOCRIT % 37 4   PLATELETS Thousands/uL 137*   NEUTROS ABS Thousands/µL 4 53         Results from last 7 days   Lab Units 09/10/19  0434 09/09/19  1752   SODIUM mmol/L 143 141   POTASSIUM mmol/L 3 8 3 7   CHLORIDE mmol/L 109* 105   CO2 mmol/L 24 27   ANION GAP mmol/L 10 9   BUN mg/dL 18 16   CREATININE mg/dL 1 71* 2 00*   EGFR ml/min/1 73sq m 33 28   CALCIUM mg/dL 8 8 9 0   MAGNESIUM mg/dL  --  1 9     Results from last 7 days   Lab Units 09/09/19  1752   AST U/L 23   ALT U/L 27   ALK PHOS U/L 69   TOTAL PROTEIN g/dL 7 8   ALBUMIN g/dL 3 9   TOTAL BILIRUBIN mg/dL 0 30         Results from last 7 days   Lab Units 09/10/19  0434 09/09/19  1752   GLUCOSE RANDOM mg/dL 101 126       Results from last 7 days   Lab Units 09/10/19  0434 09/10/19  0008 09/09/19  2127 09/09/19  1752   TROPONIN I ng/mL 0 06* 0 08* 0 07* 0 05*       Results from last 7 days   Lab Units 09/09/19  1752   TSH 3RD GENERATON uIU/mL 0 342*       Results from last 7 days   Lab Units 09/09/19  1830   AMPH/METH  Negative   BARBITURATE UR  Negative   BENZODIAZEPINE UR  Negative   COCAINE UR  Negative   METHADONE URINE  Negative   OPIATE UR  Negative   PCP UR  Negative   THC UR  Negative     Results from last 7 days   Lab Units 09/09/19  1752   ETHANOL LVL mg/dL <3       ED Treatment:   Medication Administration from 09/09/2019 1716 to 09/09/2019 2036       Date/Time Order Dose Route Action     09/09/2019 1911 acetaminophen (TYLENOL) tablet 975 mg 975 mg Oral Given        Past Medical History:   Diagnosis Date    Arthritis     Atrial fibrillation (HCC)     Atrial fibrillation (HCC)     Breast lump     GERD (gastroesophageal reflux disease)     H/O transfusion 1987    Hepatitis C     resolved    Hepatitis C     Hyperlipidemia     Hypertension     Irregular heart beat     Pacemaker     Sleep apnea     no cpap     Admitting Diagnosis: Palpitations [R00 2]  Chest pain [R07 9]  Elevated troponin [R74 8]  Age/Sex: 47 y o  female  Admission Orders:  Tele  [facility]    Current Facility-Administered Medications:  acetaminophen 650 mg Oral Q6H PRN   amLODIPine 10 mg Oral Daily   famotidine 20 mg Oral Daily   metoprolol succinate 50 mg Oral Q12H [address]   nicotine 1 patch Transdermal Daily   ondansetron 4 mg Intravenous Q6H PRN   pantoprazole 40 mg Oral Early Morning   potassium chloride 20 mEq Oral Once   rivaroxaban 20 mg Oral Daily With Breakfast   sodium chloride 75 mL/hr Intravenous Continuous   traMADol 50 mg Oral Q6H PRN       IP [facility] Utilization Review Department  Phone: [phone]; Fax [phone]  [email or web address]  ATTENTION: Please call with any questions or concerns to [phone]  and carefully listen to the prompts so that you are directed to the right person  Send all requests for admission clinical reviews, approved or denied determinations and any other requests to fax [phone]   All voicemails are confidential

## 2019-09-10 NOTE — ASSESSMENT & PLAN NOTE
· Patient appears to be in sinus rhythm at this time  History of Pacemaker/ICD implantation  Occasional and consecutive PVCs noted on EKG in ER   She has been feeling palpitations throughout the day at random times   · Monitor on telemetry   · Interrogate pacemaker   · Check TSH  · Check Magnesium   · Continue Metoprolol   · Hold parameters ordered given DANIELA   · Cardiology consult as per primary

## 2019-09-10 NOTE — CONSULTS
Consultation - Cardiology   Lazarus Merchant 47 y o  female MRN: [id number]  Unit/Bed#: -01 Encounter: [id number]    Assessment/Plan     Principal Problem:    Palpitations  Active Problems:    Atrial fibrillation [facility])    Essential hypertension    Ventricular tachycardia (HCC)    DANIELA (acute kidney injury) ([facility] )    Elevated troponin      Assessment:  1  Palpitations associated with lightheadedness  Telemetry reveals only rare PVCs at this time  She is really not having palpitations since on telemetry on the floor but was having them the more so at home and in the emergency room  2  Abnormal troponin-Non MI elevation, rather due to HCM, AK I  Appears these  are chronically elevated  No chest pain  She has undergone left heart catheterization 01/2019 which revealed only minor luminal irregularities  2  History of PAF/flutter/SVT  Maintaining  sinus rhythm  On beta-blocker and Xarelto 20 for stroke prophylaxis  Interrogation in August revealed short episode of SVT and PAF  3  History of VT status post ICD  Potassium 3 8  Mag 1 9     4  Hypertrophic non obstructive cardiomyopathy    5  HTN-modestly controlled  On Norvasc 10, Toprol 50 every 12  Zestril 10 on hold due to AK I    6  Acute kidney injury-suspected hypovolemia  Being hydrated  Diuretic on hold  It is noted that she had lower extremity edema and crossed the OptiVol threshold in July thus started on 20 Lasix daily  Plan:  1  Follow-up with ICD interrogation  ICD interrogation in  mid August revealed SVT/PAF  Currently on Toprol 50 every 12 which we may need to up titrate  Assess PVC and afib burden  2  No ischemic eval for elevated troponin   3  IV fluids per medicine  4  Replete potassium to 4   5  Hold lasix until f/u with cardiology     6  Suggest SURINDER treatment    History of Present Illness   Physician Requesting Consult: Cynthia Michelle MD  Reason for Consult / Principal Problem:  Palpitations/elevated troponin    HPI: Terri Vincent Ranjit Hough is a 47y o  year old female with PAF/flutter, VT resulting in ICD placement , hypertrophic cardiomyopathy, HTN who presents with palpitations  She had palpitations starting 2 nights ago that went through the day yesterday  She said they would only lasts several seconds but were associated with lightheadedness  She became more fatigued at work yesterday which is what prompted her to go to the emergency room  She said she was having palpitations in the emergency room but not really on the floor since she has been on telemetry  On telemetry she is having rare PVCs otherwise she is in sinus rhythm  Troponin 0 07, 0 08, 0 06  She is followed by Dr Huong Dunaway  ICD interrogation August 15, 2018-OptiVol within normal limits  Sixteen beat of SVT  Three AFib episodes max duration 11 hours  AFib burden 2 8%  It is noted that she crossed the  OptiVol threshold back in July and over the phone mention she had lower extremity edema thus was put on Lasix 20 mg daily  Transthoracic echocardiogram 04/2018-EF 65% with grade 1 diastolic dysfunction  Septal wall thickness markedly increased at 2 cm  No dynamic obstruction  Grade 1 diastolic dysfunction  Cardiac catheterization on 01/2019-minor luminal irregularities otherwise no significant epicardial CAD  Inpatient consult to Cardiology  Consult performed by: CHARLOTTE Hoyt  Consult ordered by: Marii Wong PA-C          Review of Systems   Constitutional: Positive for fatigue  HENT: Negative  Eyes: Negative  Respiratory: Negative for shortness of breath  Cardiovascular: Positive for palpitations  Negative for chest pain and leg swelling  Gastrointestinal: Negative  Genitourinary: Negative  Musculoskeletal: Negative  Neurological: Positive for light-headedness  Hematological: Negative  Psychiatric/Behavioral: Negative          Historical Information   Past Medical History:   Diagnosis Date    Arthritis     Atrial fibrillation ([address] )     Atrial fibrillation (HCC)     Breast lump     GERD (gastroesophageal reflux disease)     H/O transfusion 1987    Hepatitis C     resolved    Hepatitis C     Hyperlipidemia     Hypertension     Irregular heart beat     Pacemaker     Sleep apnea     no cpap     Past Surgical History:   Procedure Laterality Date    CARDIAC CATHETERIZATION  01/07/2019    CARDIAC DEFIBRILLATOR PLACEMENT      CARDIAC PACEMAKER PLACEMENT  2016    AFIB     CHOLECYSTECTOMY      COLONOSCOPY      ELBOW SURGERY      HYSTERECTOMY      HYSTERECTOMY      JOINT REPLACEMENT Left 2015    TKR    KNEE SURGERY Left     ID ESOPHAGOGASTRODUODENOSCOPY TRANSORAL DIAGNOSTIC N/A 5/2/2018    Procedure: ESOPHAGOGASTRODUODENOSCOPY (EGD); Surgeon: Bhavin Clinton MD;  Location: [facility];   Service: Gastroenterology    ID LARYNGOSCOPY,DIRCT,OP [facility] N/A 8/10/2018    Procedure: MICRO DIRECT LARYNGOSCOPY , EXCISION OF POLYPS, KTP LASER;  Surgeon: Dorian Rodriguez MD;  Location: [facility];  Service: ENT    REPLACEMENT TOTAL KNEE Left     THROAT SURGERY      polyps removed     Social History     Substance and Sexual Activity   Alcohol Use No     Social History     Substance and Sexual Activity   Drug Use No     Social History     Tobacco Use   Smoking Status Current Every Day Smoker    Packs/day: 0 25    Years: 0 00    Pack years: 0 00    Types: Cigarettes   Smokeless Tobacco Never Used   Tobacco Comment    about 3 daily     Family History:   Family History   Problem Relation Age of Onset    Arthritis Family     Cancer Family     Diabetes Family     Hypertension Family        Meds/Allergies   current meds:   Current Facility-Administered Medications   Medication Dose Route Frequency    acetaminophen (TYLENOL) tablet 650 mg  650 mg Oral Q6H PRN    amLODIPine (NORVASC) tablet 10 mg  10 mg Oral Daily    famotidine (PEPCID) tablet 20 mg  20 mg Oral Daily    metoprolol succinate (TOPROL-XL) 24 hr tablet 50 mg  50 mg Oral Q12H [address]    nicotine (NICODERM CQ) 14 mg/24hr TD 24 hr patch 1 patch  1 patch Transdermal Daily    ondansetron (ZOFRAN) injection 4 mg  4 mg Intravenous Q6H PRN    pantoprazole (PROTONIX) EC tablet 40 mg  40 mg Oral Early Morning    rivaroxaban (XARELTO) tablet 20 mg  20 mg Oral Daily With Breakfast    sodium chloride 0 9 % infusion  75 mL/hr Intravenous Continuous    traMADol (ULTRAM) tablet 50 mg  50 mg Oral Q6H PRN    and PTA meds:    Medications Prior to Admission   Medication    amLODIPine (NORVASC) 10 mg tablet    famotidine (PEPCID) 20 mg tablet    furosemide (LASIX) 20 mg tablet    lisinopril (ZESTRIL) 10 mg tablet    metoprolol succinate (TOPROL-XL) 50 mg 24 hr tablet    pantoprazole (PROTONIX) 40 mg tablet    rivaroxaban (XARELTO) 20 mg tablet    traMADol (ULTRAM) 50 mg tablet    pantoprazole (PROTONIX) 40 mg tablet     Allergies   Allergen Reactions    Iodinated Diagnostic Agents Hives    Tape  [Medical Tape] Hives       Objective   Vitals: Blood pressure 147/78, pulse 60, temperature 98 5 °F (36 9 °C), temperature source Oral, resp  rate 18, height 5' 7" (1 702 m), weight 105 kg (231 lb 0 7 oz), SpO2 99 %, not currently breastfeeding    Orthostatic Blood Pressures      Most Recent Value   Blood Pressure  147/78 filed at 09/10/2019 0700   Patient Position - Orthostatic VS  Lying filed at 09/10/2019 0700          No intake or output data in the 24 hours ending 09/10/19 0927    Invasive Devices     Peripheral Intravenous Line            Peripheral IV 09/09/19 Left Antecubital less than 1 day                Physical Exam: /78 (BP Location: Right arm)   Pulse 60   Temp 98 5 °F (36 9 °C) (Oral)   Resp 18   Ht 5' 7" (1 702 m)   Wt 105 kg (231 lb 0 7 oz)   SpO2 99%   BMI 36 19 kg/m²   General Appearance:    Alert, cooperative, no distress, appears stated age   Head:    Normocephalic, no scleral icterus   Eyes:    PERRL   Nose:   Nares normal, septum midline, mucosa normal, no drainage    Throat:   Lips, mucosa, and tongue normal   Neck:   Supple, symmetrical, trachea midline     no JVD   Back:     Symmetric   Lungs:     Clear to auscultation bilaterally, respirations unlabored   Chest Wall:    No tenderness or deformity    Heart:    Regular rate and rhythm, S1 and S2 normal, no murmur, rub   or gallop   Abdomen:     Soft, non-tender, bowel sounds active all four quadrants,     no masses, no organomegaly   Extremities:   Extremities normal, atraumatic, no cyanosis or edema   Pulses:   2+ and symmetric all extremities   Skin:   Skin color, texture, turgor normal, no rashes or lesions   Neurologic:   Alert and oriented to person place and time   No focal deficits       Lab Results:   Recent Results (from the past 72 hour(s))   ECG 12 lead    Collection Time: 09/09/19  5:37 PM   Result Value Ref Range    Ventricular Rate 81 BPM    Atrial Rate 67 BPM    MA Interval 140 ms    QRSD Interval 156 ms    QT Interval 422 ms    QTC Interval 490 ms    P Axis 71 degrees    QRS Axis -32 degrees    T Wave Axis 109 degrees   CBC and differential    Collection Time: 09/09/19  5:52 PM   Result Value Ref Range    WBC 6 49 4 31 - 10 16 Thousand/uL    RBC 4 31 3 81 - 5 12 Million/uL    Hemoglobin 11 8 11 5 - 15 4 g/dL    Hematocrit 37 4 34 8 - 46 1 %    MCV 87 82 - 98 fL    MCH 27 4 26 8 - 34 3 pg    MCHC 31 6 31 4 - 37 4 g/dL    RDW 14 0 11 6 - 15 1 %    MPV 11 3 8 9 - 12 7 fL    Platelets 318 (L) 201 - 390 Thousands/uL    nRBC 0 /100 WBCs    Neutrophils Relative 70 43 - 75 %    Immat GRANS % 0 0 - 2 %    Lymphocytes Relative 23 14 - 44 %    Monocytes Relative 6 4 - 12 %    Eosinophils Relative 1 0 - 6 %    Basophils Relative 0 0 - 1 %    Neutrophils Absolute 4 53 1 85 - 7 62 Thousands/µL    Immature Grans Absolute 0 01 0 00 - 0 20 Thousand/uL    Lymphocytes Absolute 1 46 0 60 - 4 47 Thousands/µL    Monocytes Absolute 0 41 0 17 - 1 22 Thousand/µL    Eosinophils Absolute 0 07 0 00 - 0 61 Thousand/µL    Basophils Absolute 0 01 0 00 - 0 10 Thousands/µL   Comprehensive metabolic panel    Collection Time: 09/09/19  5:52 PM   Result Value Ref Range    Sodium 141 136 - 145 mmol/L    Potassium 3 7 3 5 - 5 3 mmol/L    Chloride 105 100 - 108 mmol/L    CO2 27 21 - 32 mmol/L    ANION GAP 9 4 - 13 mmol/L    BUN 16 5 - 25 mg/dL    Creatinine 2 00 (H) 0 60 - 1 30 mg/dL    Glucose 126 65 - 140 mg/dL    Calcium 9 0 8 3 - 10 1 mg/dL    AST 23 5 - 45 U/L    ALT 27 12 - 78 U/L    Alkaline Phosphatase 69 46 - 116 U/L    Total Protein 7 8 6 4 - 8 2 g/dL    Albumin 3 9 3 5 - 5 0 g/dL    Total Bilirubin 0 30 0 20 - 1 00 mg/dL    eGFR 28 ml/min/1 73sq m   Troponin I    Collection Time: 09/09/19  5:52 PM   Result Value Ref Range    Troponin I 0 05 (H) <=0 04 ng/mL   Ethanol    Collection Time: 09/09/19  5:52 PM   Result Value Ref Range    Ethanol Lvl <3 0 - 3 mg/dL   TSH, 3rd generation    Collection Time: 09/09/19  5:52 PM   Result Value Ref Range    TSH 3RD GENERATON 0 342 (L) 0 358 - 3 740 uIU/mL   Magnesium    Collection Time: 09/09/19  5:52 PM   Result Value Ref Range    Magnesium 1 9 1 6 - 2 6 mg/dL   Rapid drug screen, urine    Collection Time: 09/09/19  6:30 PM   Result Value Ref Range    Amph/Meth UR Negative Negative    Barbiturate Ur Negative Negative    Benzodiazepine Urine Negative Negative    Cocaine Urine Negative Negative    Methadone Urine Negative Negative    Opiate Urine Negative Negative    PCP Ur Negative Negative    THC Urine Negative Negative   Troponin I    Collection Time: 09/09/19  9:27 PM   Result Value Ref Range    Troponin I 0 07 (H) <=0 04 ng/mL   Troponin I    Collection Time: 09/10/19 12:08 AM   Result Value Ref Range    Troponin I 0 08 (H) <=0 04 ng/mL   Troponin I    Collection Time: 09/10/19  4:34 AM   Result Value Ref Range    Troponin I 0 06 (H) <=0 04 ng/mL   Basic metabolic panel    Collection Time: 09/10/19  4:34 AM   Result Value Ref Range    Sodium 143 136 - 145 mmol/L    Potassium 3 8 3 5 - 5 3 mmol/L    Chloride 109 (H) 100 - 108 mmol/L    CO2 24 21 - 32 mmol/L    ANION GAP 10 4 - 13 mmol/L    BUN 18 5 - 25 mg/dL    Creatinine 1 71 (H) 0 60 - 1 30 mg/dL    Glucose 101 65 - 140 mg/dL    Glucose, Fasting 101 (H) 65 - 99 mg/dL    Calcium 8 8 8 3 - 10 1 mg/dL    eGFR 33 ml/min/1 73sq m     [place] [id number], [address] [employer]  [phone]     Transthoracic Echocardiogram  2D, M-mode, Doppler, and Color Doppler     Study date:  2019     Patient: Preet Mills  MR number: [id number]  Account number: [de-identified]  : 1965  Age: 47 years  Gender: Female  Status: Outpatient  Location: [address] [place] [address]  Height: 67 in  Weight: 258 5 lb  BP: 114/ 84 mmHg     Indications: Atrial fibrillation      Diagnoses: I48 0 - Atrial fibrillation     Sonographer:  Izabela Lamar RDCS  Primary Physician:  Lizz Ornelas MD  Referring Physician: CHARLOTTE Rock  Group:  [facility]  RN:  Leon Shore RN  Interpreting Physician:  Jonathan Hassan MD     SUMMARY     LEFT VENTRICLE:  Systolic function was normal  Ejection fraction was estimated to be 65 %  There were no regional wall motion abnormalities  Septal wall thickness was markedly increased  (2cm)  However the posterior wall was not seen well enough for accurate quantification of wall thickness  There was no dynamic obstruction  Doppler parameters were consistent with abnormal left ventricular relaxation (grade 1 diastolic dysfunction)      HISTORY: PRIOR HISTORY: Hypertension, ventricular tachycardia, internal cardiac defibrillator implant, hypertrophic obstructive cardiomyopathy, NSTEMI, current smoker      PROCEDURE: The study was performed in the [address] [place] [address]  This was a routine study  The transthoracic approach was used  The study included complete 2D imaging, M-mode, complete spectral Doppler, and color Doppler   The  heart rate was 60 bpm, at the start of the study  Images were obtained from the parasternal, apical, subcostal, and suprasternal notch acoustic windows  Intravenous contrast ( 0 8 ml Definity in nss) was administered  Intravenous contrast  was administered to opacify the left ventricle  Echocardiographic views were limited due to poor acoustic window availability, decreased penetration, and lung interference  This was a technically difficult study      LEFT VENTRICLE: Size was normal  Systolic function was normal  Ejection fraction was estimated to be 65 %  There were no regional wall motion abnormalities  Septal wall thickness was markedly increased  (2cm)  However the posterior wall was not seen well enough for accurate quantification of wall thickness  DOPPLER: There was no dynamic obstruction  Doppler parameters were consistent with abnormal left ventricular relaxation (grade 1 diastolic  dysfunction)      RIGHT VENTRICLE: The size was normal  Systolic function was normal  Wall thickness was normal      LEFT ATRIUM: Size was normal      RIGHT ATRIUM: Size was normal      MITRAL VALVE: Valve structure was normal  There was normal leaflet separation  DOPPLER: The transmitral velocity was within the normal range  There was no evidence for stenosis  There was no significant regurgitation      AORTIC VALVE: The valve was trileaflet  Leaflets exhibited normal thickness and normal cuspal separation  DOPPLER: Transaortic velocity was within the normal range  There was no evidence for stenosis  There was no significant  regurgitation      TRICUSPID VALVE: The valve structure was normal  There was normal leaflet separation  DOPPLER: The transtricuspid velocity was within the normal range  There was no evidence for stenosis  There was no significant regurgitation      PULMONIC VALVE: Leaflets exhibited normal thickness, no calcification, and normal cuspal separation  DOPPLER: The transpulmonic velocity was within the normal range   There was no significant regurgitation      PERICARDIUM: There was no pericardial effusion  The pericardium was normal in appearance      AORTA: The root exhibited normal size      SYSTEMIC VEINS: IVC: The inferior vena cava was not well visualized      SYSTEM MEASUREMENT TABLES      CORONARY VESSELS:   --  The coronary circulation is right dominant  --  Left main: Angiography showed minor luminal irregularities  --  LAD: Angiography showed minor luminal irregularities  --  Circumflex: Angiography showed minor luminal irregularities  --  RCA: Angiography showed minor luminal irregularities      IMPRESSIONS:  No significant epicardial CAD      RECOMMENDATIONS  med rx cad standpoint      Prepared and signed by  Eleazar Kelley DO  Signed 01/07/2019 09:07:14    Imaging: I have personally reviewed pertinent reports  EKG: NSR  VTE Prophylaxis: xarelto    Code Status: Level 1 - Full Code  Advance Directive and Living Will:      Power of :    POLST:      Counseling / Coordination of Care  Total floor / unit time spent today 45 minutes  Greater than 50% of total time was spent with the patient and / or family counseling and / or coordination of care

## 2019-09-10 NOTE — ASSESSMENT & PLAN NOTE
· BP acceptable on admission   · Continue Norvasc and Coreg with hold parameters  · Holding Lisinopril and Lasix in the setting of DANIELA

## 2019-09-10 NOTE — ASSESSMENT & PLAN NOTE
· Baseline appears to be 1 3-1 6  Elevated at 2 00 today  Reports that she was packing and moving items over the weekend and was taking her Lasix  I suspect due to dehydration   Possibly some element of cardiorenal syndrome depending on her arrhythmia burden   · IVF overnight   · Avoid nephrotoxins   · Lasix, Lisinopril Held   · Avoid hypotension   · Coreg and Norvasc parameters changed to be held for SBP < 130 mmHg  · Check BMP in AM

## 2019-09-10 NOTE — ASSESSMENT & PLAN NOTE
· Patient reports subjective palpitations throughout the day starting last night  Has had some dizziness throughout the day as well  Mild chest pain, but the palpitations were more concerning to patient  No sensation that ICD discharged - she has felt this in the past  Recent device interrogation in August showed 3 episodes of AF and 1 SVT on EGM  Troponin elevation noted   EKG significant for PVCs   · Admit patient to med/surg under observation status   · Consult cardiology   · Telemetry   · Interrogate pacemaker   · Trend troponin   · Check TSH  · Trend K, Mg

## 2019-09-13 ENCOUNTER — REMOTE DEVICE CLINIC VISIT (OUTPATIENT)
Dept: CARDIOLOGY CLINIC | Facility: CLINIC | Age: 54
End: 2019-09-13
Payer: COMMERCIAL

## 2019-09-13 DIAGNOSIS — Z95.810 PRESENCE OF AUTOMATIC CARDIOVERTER/DEFIBRILLATOR (AICD): Primary | ICD-10-CM

## 2019-09-13 PROCEDURE — 93297 REM INTERROG DEV EVAL ICPMS: CPT | Performed by: INTERNAL MEDICINE

## 2019-09-13 PROCEDURE — 93296 REM INTERROG EVL PM/IDS: CPT | Performed by: INTERNAL MEDICINE

## 2019-09-13 NOTE — PROGRESS NOTES
Results for orders placed or performed in visit on 09/13/19   Cardiac EP device report    Narrative    MDT DUAL ICD  [employer] TRANSMISSION - OPTI-VOL ONLY: BATTERY VOLTAGE ADEQUATE (7 5 YRS)  AP: 44 2%  : 0 8% (MVP-ON)  ALL AVAILABLE LEAD PARAMETERS WITHIN NORMAL LIMITS  1 AT/AF EPISODE W/ EGRAM SHOWING AF, DURATION APPROX 9 HRS  ON 9/11/19 (NOTE-PT WAS @ [facility] W/ SYMPTOMS OF PALPS ON 9/9)  PT TAKES METOPROLOL SUCC, XARELTO  EF: 65% (ECHO 4/18/19)  OPTI-VOL WITHIN NORMAL LIMITS  APPROPRIATELY FUNCTIONING ICD    [facility]

## 2019-09-23 NOTE — PROGRESS NOTES
Hospital Follow Up   Office Visit Note  Leslye Wells   47 y o    female   MRN: [id number]  [address]  [address] [facility] [address] [facility] [address]  [phone]    PCP: Aviva Mejia MD  Cardiologist: Dr Hiwot Lieberman           Assessment/plan  Palpitations associated with lightheadedness, recent adm   Interro: a fib x 9 hrs Sept 11  ? Rate  Will increase her BB  History of PAF/flutter/SVT  Maintaining  sinus rhythm  on interrogation of her ICD, September 13, she did have 9 hours of AFib September 11th  On beta-blocker and Xarelto 20 for stroke prophylaxis  --increase Toprol to 75 q 12 hours  History of VT status post MDT ICD July 2016  -- interrogation 8/15/19:  Short bursts of AFib and SVT, NSVT   60%, V pacing 0 4%  3 AFib episodes, max duration 11 1 hours   AFib burden 2 8%  --Interrogation 9/13/19:  AP 44%  V paced 0 8%  AFib episode, approximately 9 hours on September 11  Symptomatic  OptiVol within normal limits  Hypertrophic non obstructive cardiomyopathy  Hypertension  /70  On amlodipine 10 mg daily, Toprol 50 mg q12h and loop diuretic  (Zestril 10 daily on hold due to DANIELA )--> will decrease amlodipine to 5 mg daily and increase Toprol to 75 mg q 12 hours  Nonfasting BMP today  CKD 3  Baseline Cr 1 3-  1 5  Cr 1 7 Sept 10  Cardiac testing  --TTE 04/2018-EF 65% with grade 1 diastolic dysfunction  Septal wall thickness markedly increased at 2 cm  No dynamic obstruction  Grade 1 diastolic dysfunction  --cardiac catheterization 01/2019-minor luminal irregularities otherwise no significant epicardial CAD  Summary of recommendations  Heart healthy diet   Educational information provided  Increase Toprol to 75 mg q12h  Decrease amlodipine to 5 mg/d  Repeat BMP nonfasting today  Follow up will be scheduled with Dr Hiwot Lieberman 3 months            HPI  Robin Robbins is a 46 yo female with a history of hypertrophic nonobstructive cardiomyopathy, HTN, a fib/flutter and VT  She has had life threatening VT with near syncope in the past and received a [employer] dual chamber ICD for secondary prevention (7/16 by [facility]) She now follows with cardiologists Dr Kareem Vargas and EPS Dr Lilia Tran; formerly Dr Eder Suresh  She is chronically on xarelto for [address]  January 2019 she was admitted with rapid AFib and found to elevated troponins , peaking at 0 4, for which she underwent an outpatient cardiac catheterization  This showed no significant epicardial coronary artery disease  July 2019 she crossed the OptiVol fluid index and was placed on Lasix 20 mg daily  Recently, she presented to [facility] ED with palpitations  On telemetry she was rare PVCs, in normal sinus rhythm  Troponin were minimally elevated not felt to be clinically significant, due to HCM  A recent interrogation in August, of her  device showed short bursts AFib and SVT  There was no change made to her medications as she was chronically Toprol 50 mg b i d   A repeat interrogation of her device September 13 did show 9 hours of AFib which was symptomatic  Her OptiVol fluid level was within normal limits  While hospitalized, she was found have acute kidney injury, to 2,  Baseline 1 3-1 5 for which Lasix and lisinopril were held  Lasix restarted  Her last creatinine was 1 7      9/25/19  She presents for hospital follow-up  Her EKG today shows sinus rhythm at 65 beats per minute with LAD, RBBB and LVH with repolarization abnormality seen previously  She feels well without chest pain or shortness of breath  Given her recent interrogation will up titrate her beta-blocker  I will decrease her calcium channel blocker to give her some blood pressure room  She will go for some additional blood work today to reassess her renal function  She will remain off lisinopril at this time        Assessment  Diagnoses and all orders for this visit:    Atrial fibrillation, unspecified type ([facility] [address] )  -     POCT ECG    Essential hypertension  -     POCT ECG    NSTEMI (non-ST elevated myocardial infarction) ([facility] [address] )  -     POCT ECG    Hospital discharge follow-up    ICD (implantable cardioverter-defibrillator) discharge    Ventricular tachycardia (HCC)    Hyperlipidemia, unspecified hyperlipidemia type    Other orders  -     furosemide (LASIX) 20 mg tablet  -     cyclobenzaprine (FLEXERIL) 5 mg tablet; cyclobenzaprine 5 mg tablet          Past Medical History:   Diagnosis Date    Arthritis     Atrial fibrillation (HCC)     Atrial fibrillation (HCC)     Breast lump     GERD (gastroesophageal reflux disease)     H/O transfusion 1987    Hepatitis C     resolved    Hepatitis C     Hyperlipidemia     Hypertension     Irregular heart beat     Pacemaker     Sleep apnea     no cpap       Review of Systems   Constitution: Negative for chills  Cardiovascular: Negative for chest pain, claudication, cyanosis, dyspnea on exertion, irregular heartbeat, leg swelling, near-syncope, orthopnea, palpitations, paroxysmal nocturnal dyspnea and syncope  Respiratory: Negative for cough and shortness of breath  Gastrointestinal: Negative for heartburn and nausea  Neurological: Negative for dizziness, focal weakness, headaches, light-headedness and weakness  All other systems reviewed and are negative  Allergies   Allergen Reactions    Iodinated Diagnostic Agents Hives    Tape  [Medical Tape] Hives       Current Outpatient Medications:     amLODIPine (NORVASC) 10 mg tablet, Take 10 mg by mouth daily  , Disp: , Rfl:     cyclobenzaprine (FLEXERIL) 5 mg tablet, cyclobenzaprine 5 mg tablet, Disp: , Rfl:     famotidine (PEPCID) 20 mg tablet, Take 20 mg by mouth daily, Disp: , Rfl:     metoprolol succinate (TOPROL-XL) 50 mg 24 hr tablet, Take 1 tablet (50 mg total) by mouth every 12 (twelve) hours, Disp: 60 tablet, Rfl: 0    pantoprazole (PROTONIX) 40 mg tablet, take 1 tablet by mouth daily 30 MINUTES BEFORE BREAKFAST, Disp: 30 tablet, Rfl: 6    rivaroxaban (XARELTO) 20 mg tablet, Take 20 mg by mouth daily with breakfast, Disp: , Rfl:     traMADol (ULTRAM) 50 mg tablet, Take 50 mg by mouth every 6 (six) hours as needed for moderate pain, Disp: , Rfl:     furosemide (LASIX) 20 mg tablet, , Disp: , Rfl: 0    nicotine (NICODERM CQ) 14 mg/24hr TD 24 hr patch, Place 1 patch on the skin daily (Patient not taking: Reported on 9/25/2019), Disp: 28 patch, Rfl: 0        Social History     Socioeconomic History    Marital status: /Civil Union     Spouse name: Not on file    Number of children: Not on file    Years of education: Not on file    Highest education level: Not on file   Occupational History    Not on file   Social Needs    Financial resource strain: Not on file    Food insecurity:     Worry: Not on file     Inability: Not on file    Transportation needs:     Medical: Not on file     Non-medical: Not on file   Tobacco Use    Smoking status: Current Every Day Smoker     Packs/day: 0 25     Years: 0 00     Pack years: 0 00     Types: Cigarettes    Smokeless tobacco: Never Used    Tobacco comment: about 3 daily   Substance and Sexual Activity    Alcohol use: No    Drug use: No    Sexual activity: Not on file   Lifestyle    Physical activity:     Days per week: Not on file     Minutes per session: Not on file    Stress: Not on file   Relationships    Social connections:     Talks on phone: Not on file     Gets together: Not on file     Attends Denominational service: Not on file     Active member of club or organization: Not on file     Attends meetings of clubs or organizations: Not on file     Relationship status: Not on file    Intimate partner violence:     Fear of current or ex partner: Not on file     Emotionally abused: Not on file     Physically abused: Not on file     Forced sexual activity: Not on file   Other Topics Concern    Not on file   Social History Narrative    disabled       Family History   Problem Relation Age of Onset    Arthritis Family     Cancer Family     Diabetes Family     Hypertension Family        Physical Exam   Constitutional: She is oriented to person, place, and time  No distress  HENT:   Head: Normocephalic and atraumatic  Eyes: Conjunctivae and EOM are normal    Neck: Normal range of motion  Neck supple  Cardiovascular: Normal rate, regular rhythm, normal heart sounds and intact distal pulses  Pulmonary/Chest: Effort normal and breath sounds normal    Abdominal: Soft  Bowel sounds are normal    Musculoskeletal: Normal range of motion  Neurological: She is alert and oriented to person, place, and time  Skin: Skin is warm and dry  She is not diaphoretic  Psychiatric: She has a normal mood and affect  Nursing note and vitals reviewed  Vitals: Blood pressure 134/70, pulse 88, height 5' 7" (1 702 m), weight 107 kg (234 lb 12 8 oz), SpO2 98 %, not currently breastfeeding  Wt Readings from Last 3 Encounters:   09/25/19 107 kg (234 lb 12 8 oz)   09/09/19 105 kg (231 lb 0 7 oz)   01/23/19 118 kg (259 lb 6 4 oz)         Labs & Results:  Lab Results   Component Value Date    WBC 6 49 09/09/2019    HGB 11 8 09/09/2019    HCT 37 4 09/09/2019    MCV 87 09/09/2019     (L) 09/09/2019     No results found for: BNP  No components found for: CHEM  Troponin I   Date Value Ref Range Status   09/10/2019 0 06 (H) <=0 04 ng/mL Final     Comment:       Siemens Chemistry analyzer 99% cutoff is > 0 04 ng/mL in network labs     o cTnI 99% cutoff is useful only when applied to patients in the clinical setting of myocardial ischemia   o cTnI 99% cutoff should be interpreted in the context of clinical history, ECG findings and possibly cardiac imaging to establish correct diagnosis  o cTnI 99% cutoff may be suggestive but clearly not indicative of a coronary event without the clinical setting of myocardial ischemia      Results indicate test should be repeated on new specimen collected within 4-6 hours of the original   09/10/2019 0 08 (H) <=0 04 ng/mL Final     Comment:       Siemens Chemistry analyzer 99% cutoff is > 0 04 ng/mL in network labs     o cTnI 99% cutoff is useful only when applied to patients in the clinical setting of myocardial ischemia   o cTnI 99% cutoff should be interpreted in the context of clinical history, ECG findings and possibly cardiac imaging to establish correct diagnosis  o cTnI 99% cutoff may be suggestive but clearly not indicative of a coronary event without the clinical setting of myocardial ischemia  Results indicate test should be repeated on new specimen collected within 4-6 hours of the original   2019 0 07 (H) <=0 04 ng/mL Final     Comment:       Siemens Chemistry analyzer 99% cutoff is > 0 04 ng/mL in network labs     o cTnI 99% cutoff is useful only when applied to patients in the clinical setting of myocardial ischemia   o cTnI 99% cutoff should be interpreted in the context of clinical history, ECG findings and possibly cardiac imaging to establish correct diagnosis  o cTnI 99% cutoff may be suggestive but clearly not indicative of a coronary event without the clinical setting of myocardial ischemia  Results indicate test should be repeated on new specimen collected within 4-6 hours of the original     Results for orders placed during the hospital encounter of 19   Echo complete with contrast if indicated    Narrative [place] [address], [address] [place] [address]  [phone]    Transthoracic Echocardiogram  2D, M-mode, Doppler, and Color Doppler    Study date:  2019    Patient: Zak Grewal  MR number: [id number]  Account number: [de-identified]  : 1965  Age: 47 years  Gender: Female  Status: Outpatient  Location: [facility]  Height: 67 in  Weight: 258 5 lb  BP: 114/ 84 mmHg    Indications: Atrial fibrillation      Diagnoses: I48 0 - Atrial fibrillation    Sonographer:  Sofia Cleaning Lily Singh  Primary Physician:  Dalton Westbrook MD  Referring Physician: CHARLOTTE Eduardo  Group:  [facility]  RN:  Amada Johnston RN  Interpreting Physician:  Neha Tyson MD    SUMMARY    LEFT VENTRICLE:  Systolic function was normal  Ejection fraction was estimated to be 65 %  There were no regional wall motion abnormalities  Septal wall thickness was markedly increased  (2cm)  However the posterior wall was not seen well enough for accurate quantification of wall thickness  There was no dynamic obstruction  Doppler parameters were consistent with abnormal left ventricular relaxation (grade 1 diastolic dysfunction)  HISTORY: PRIOR HISTORY: Hypertension, ventricular tachycardia, internal cardiac defibrillator implant, hypertrophic obstructive cardiomyopathy, NSTEMI, current smoker  PROCEDURE: The study was performed in the [facility]  This was a routine study  The transthoracic approach was used  The study included complete 2D imaging, M-mode, complete spectral Doppler, and color Doppler  The  heart rate was 60 bpm, at the start of the study  Images were obtained from the parasternal, apical, subcostal, and suprasternal notch acoustic windows  Intravenous contrast ( 0 8 ml Definity in nss) was administered  Intravenous contrast  was administered to opacify the left ventricle  Echocardiographic views were limited due to poor acoustic window availability, decreased penetration, and lung interference  This was a technically difficult study  LEFT VENTRICLE: Size was normal  Systolic function was normal  Ejection fraction was estimated to be 65 %  There were no regional wall motion abnormalities  Septal wall thickness was markedly increased  (2cm)  However the posterior wall was not seen well enough for accurate quantification of wall thickness  DOPPLER: There was no dynamic obstruction   Doppler parameters were consistent with abnormal left ventricular relaxation (grade 1 diastolic  dysfunction)  RIGHT VENTRICLE: The size was normal  Systolic function was normal  Wall thickness was normal     LEFT ATRIUM: Size was normal     RIGHT ATRIUM: Size was normal     MITRAL VALVE: Valve structure was normal  There was normal leaflet separation  DOPPLER: The transmitral velocity was within the normal range  There was no evidence for stenosis  There was no significant regurgitation  AORTIC VALVE: The valve was trileaflet  Leaflets exhibited normal thickness and normal cuspal separation  DOPPLER: Transaortic velocity was within the normal range  There was no evidence for stenosis  There was no significant  regurgitation  TRICUSPID VALVE: The valve structure was normal  There was normal leaflet separation  DOPPLER: The transtricuspid velocity was within the normal range  There was no evidence for stenosis  There was no significant regurgitation  PULMONIC VALVE: Leaflets exhibited normal thickness, no calcification, and normal cuspal separation  DOPPLER: The transpulmonic velocity was within the normal range  There was no significant regurgitation  PERICARDIUM: There was no pericardial effusion  The pericardium was normal in appearance  AORTA: The root exhibited normal size  SYSTEMIC VEINS: IVC: The inferior vena cava was not well visualized      SYSTEM MEASUREMENT TABLES    2D  %FS: 26 28 %  AV Diam: 3 05 cm  EDV(Teich): 63 61 ml  EF(Cube): 59 94 %  EF(Teich): 52 21 %  ESV(Cube): 22 72 ml  ESV(Teich): 30 4 ml  IVSd: 2 cm  LA Diam: 3 66 cm  LVIDd: 3 84 cm  LVIDs: 2 83 cm  LVPWd: 2 04 cm  SI(Cube): 15 05 ml/m2  SI(Teich): 14 7 ml/m2  SV(Cube): 34 ml  SV(Teich): 33 21 ml    CW  TR MaxPG: 10 5 mmHg  TR Vmax: 1 62 m/s    MM  TAPSE: 2 98 cm    PW  E': 0 06 m/s  E/E': 14 83  MV A Zeferino: 0 86 m/s  MV Dec [place]: 2 4 m/s2  MV DecT: 357 04 ms  MV E Zeferino: 0 86 m/s  MV E/A Ratio: 1    [employer] Accredited Echocardiography Laboratory    Prepared and electronically signed by    Casper Rodas MD  Signed 18-Apr-2019 12:25:42       No results found for this or any previous visit  This note was completed in part utilizing m-modal fluency direct voice recognition software  Grammatical errors, random word insertion, spelling mistakes, and incomplete sentences may be an occasional consequence of the system secondary to software limitations, ambient noise and hardware issues  At the time of dictation, efforts were made to edit, clarify and /or correct errors  Please read the chart carefully and recognize, using context, where substitutions have occurred    If you have any questions or concerns about the context, text or information contained within the body of this dictation, please contact myself, the provider, for further clarification

## 2019-09-25 ENCOUNTER — APPOINTMENT (OUTPATIENT)
Dept: LAB | Facility: CLINIC | Age: 54
End: 2019-09-25
Payer: COMMERCIAL

## 2019-09-25 ENCOUNTER — OFFICE VISIT (OUTPATIENT)
Dept: CARDIOLOGY CLINIC | Facility: CLINIC | Age: 54
End: 2019-09-25
Payer: COMMERCIAL

## 2019-09-25 ENCOUNTER — TRANSCRIBE ORDERS (OUTPATIENT)
Dept: LAB | Facility: CLINIC | Age: 54
End: 2019-09-25

## 2019-09-25 VITALS
BODY MASS INDEX: 36.85 KG/M2 | WEIGHT: 234.8 LBS | OXYGEN SATURATION: 98 % | DIASTOLIC BLOOD PRESSURE: 70 MMHG | HEART RATE: 88 BPM | HEIGHT: 67 IN | SYSTOLIC BLOOD PRESSURE: 134 MMHG

## 2019-09-25 DIAGNOSIS — I21.4 NSTEMI (NON-ST ELEVATED MYOCARDIAL INFARCTION) (HCC): ICD-10-CM

## 2019-09-25 DIAGNOSIS — I48.91 ATRIAL FIBRILLATION WITH RVR (HCC): Chronic | ICD-10-CM

## 2019-09-25 DIAGNOSIS — I47.2 VENTRICULAR TACHYCARDIA (HCC): ICD-10-CM

## 2019-09-25 DIAGNOSIS — I10 ESSENTIAL HYPERTENSION: Chronic | ICD-10-CM

## 2019-09-25 DIAGNOSIS — I48.91 ATRIAL FIBRILLATION, UNSPECIFIED TYPE (HCC): Primary | Chronic | ICD-10-CM

## 2019-09-25 DIAGNOSIS — E78.5 HYPERLIPIDEMIA, UNSPECIFIED HYPERLIPIDEMIA TYPE: ICD-10-CM

## 2019-09-25 DIAGNOSIS — Z45.02 ICD (IMPLANTABLE CARDIOVERTER-DEFIBRILLATOR) DISCHARGE: ICD-10-CM

## 2019-09-25 DIAGNOSIS — Z09 HOSPITAL DISCHARGE FOLLOW-UP: ICD-10-CM

## 2019-09-25 LAB
ANION GAP SERPL CALCULATED.3IONS-SCNC: 8 MMOL/L (ref 4–13)
BUN SERPL-MCNC: 19 MG/DL (ref 5–25)
CALCIUM SERPL-MCNC: 8.7 MG/DL (ref 8.3–10.1)
CHLORIDE SERPL-SCNC: 103 MMOL/L (ref 100–108)
CO2 SERPL-SCNC: 27 MMOL/L (ref 21–32)
CREAT SERPL-MCNC: 1.44 MG/DL (ref 0.6–1.3)
GFR SERPL CREATININE-BSD FRML MDRD: 41 ML/MIN/1.73SQ M
GLUCOSE SERPL-MCNC: 86 MG/DL (ref 65–140)
POTASSIUM SERPL-SCNC: 3.7 MMOL/L (ref 3.5–5.3)
SODIUM SERPL-SCNC: 138 MMOL/L (ref 136–145)

## 2019-09-25 PROCEDURE — 36415 COLL VENOUS BLD VENIPUNCTURE: CPT

## 2019-09-25 PROCEDURE — 3075F SYST BP GE 130 - 139MM HG: CPT | Performed by: NURSE PRACTITIONER

## 2019-09-25 PROCEDURE — 3078F DIAST BP <80 MM HG: CPT | Performed by: NURSE PRACTITIONER

## 2019-09-25 PROCEDURE — 93000 ELECTROCARDIOGRAM COMPLETE: CPT | Performed by: NURSE PRACTITIONER

## 2019-09-25 PROCEDURE — 80048 BASIC METABOLIC PNL TOTAL CA: CPT

## 2019-09-25 PROCEDURE — 99214 OFFICE O/P EST MOD 30 MIN: CPT | Performed by: NURSE PRACTITIONER

## 2019-09-25 RX ORDER — FUROSEMIDE 20 MG/1
TABLET ORAL
Refills: 0 | Status: ON HOLD | COMMUNITY
Start: 2019-09-10 | End: 2019-10-10 | Stop reason: SDUPTHER

## 2019-09-25 RX ORDER — CYCLOBENZAPRINE HCL 5 MG
TABLET ORAL
COMMUNITY
End: 2020-05-07 | Stop reason: ALTCHOICE

## 2019-09-25 RX ORDER — AMLODIPINE BESYLATE 10 MG/1
TABLET ORAL
Start: 2019-09-25 | End: 2020-03-10 | Stop reason: HOSPADM

## 2019-09-25 RX ORDER — METOPROLOL SUCCINATE 50 MG/1
TABLET, EXTENDED RELEASE ORAL
Qty: 90 TABLET | Refills: 1 | Status: SHIPPED | OUTPATIENT
Start: 2019-09-25 | End: 2020-03-03 | Stop reason: SDUPTHER

## 2019-09-25 NOTE — LETTER
Cardiology Pre Operative Clearance      PRE OPERATIVE CARDIAC RISK ASSESSMENT    09/25/19    Paige Packer  1965  [id number]    Date of Surgery: 10/02/2019     Type of Surgery: Epidural injection joint  L3-L5    Surgeon: Dr Kenneth Robertson    No Cardiac Contraindication for Planned Surgical Procedures    Anticoagulation: yes, xarleto 20 mg     Physician Comment: can hold xarelto x 48 hrs before; resume 24 hours after  Electronically Signed: Antwan PORTER

## 2019-09-25 NOTE — LETTER
September 25, 2019     Thelma Yip MD  [facility]    Patient: Jurgen Shaw   YOB: 1965   Date of Visit: 9/25/2019       Dear Dr Mandy Traore: Thank you for referring Rob Gabriel to me for evaluation  Below are my notes for this consultation  If you have questions, please do not hesitate to call me  I look forward to following your patient along with you  Sincerely,        CHARLOTTE Foote        CC: MD Salma Walton, [address] [facility]  9/25/2019  3:29 PM  Sign at close encounter  Hospital Follow Up   Office Visit Note  Jurgen Shaw   47 y o    female   MRN: [id number]  [address]  [place] [address] [facility] [place] [address]  [phone]    PCP: Thelma Yip MD  Cardiologist: Dr Ariella Hansen           Assessment/plan  Palpitations associated with lightheadedness, recent adm   Interro: a fib x 9 hrs Sept 11  ? Rate  Will increase her BB  History of PAF/flutter/SVT  Maintaining  sinus rhythm  on interrogation of her ICD, September 13, she did have 9 hours of AFib September 11th  On beta-blocker and Xarelto 20 for stroke prophylaxis  --increase Toprol to 75 q 12 hours  History of VT status post MDT ICD July 2016  -- interrogation 8/15/19:  Short bursts of AFib and SVT, NSVT   60%, V pacing 0 4%  3 AFib episodes, max duration 11 1 hours   AFib burden 2 8%  --Interrogation 9/13/19:  AP 44%  V paced 0 8%  AFib episode, approximately 9 hours on September 11  Symptomatic  OptiVol within normal limits  Hypertrophic non obstructive cardiomyopathy  Hypertension  /70  On amlodipine 10 mg daily, Toprol 50 mg q12h and loop diuretic  (Zestril 10 daily on hold due to DANIELA )--> will decrease amlodipine to 5 mg daily and increase Toprol to 75 mg q 12 hours  Nonfasting BMP today  CKD 3  Baseline Cr 1 3-  1 5  Cr 1 7 Sept 10  Cardiac testing  --TTE 04/2018-EF 65% with grade 1 diastolic dysfunction  Septal wall thickness markedly increased at 2 cm  No dynamic obstruction  Grade 1 diastolic dysfunction  --cardiac catheterization 01/2019-minor luminal irregularities otherwise no significant epicardial CAD  Summary of recommendations  Heart healthy diet  Educational information provided  Increase Toprol to 75 mg q12h  Decrease amlodipine to 5 mg/d  Repeat BMP nonfasting today  Follow up will be scheduled with Dr Ginny Olivares 3 months            HPI  Wilma Bauer is a 46 yo female with a history of hypertrophic nonobstructive cardiomyopathy, HTN,  a fib/flutter and VT  She has had life threatening VT with near syncope in the past and received a [employer] dual chamber ICD for secondary prevention (7/16 by [facility]) She now follows with cardiologists Dr Ginny Olivares and EPS Dr Lizzie Farrell; formerly Dr Rodrigue Bean  She is chronically on xarelto for [facility] [place] [facility]  January 2019 she was admitted with rapid AFib and found to elevated troponins , peaking at 0 4, for which she underwent an outpatient cardiac catheterization  This showed no significant epicardial coronary artery disease  July 2019 she crossed the OptiVol fluid index and was placed on Lasix 20 mg daily  Recently, she presented to [facility] ED with palpitations  On telemetry she was rare PVCs, in normal sinus rhythm  Troponin were minimally elevated not felt to be clinically significant, due to HCM  A recent interrogation in August, of her  device showed short bursts AFib and SVT  There was no change made to her medications as she was chronically Toprol 50 mg b i d   A repeat interrogation of her device September 13 did show 9 hours of AFib which was symptomatic  Her OptiVol fluid level was within normal limits  While hospitalized, she was found have acute kidney injury, to 2,  Baseline 1 3-1 5 for which Lasix and lisinopril were held  Lasix restarted  Her last creatinine was 1 7      9/25/19  She presents for hospital follow-up    Her EKG today shows sinus rhythm at 65 beats per minute with LAD, RBBB and LVH with repolarization abnormality seen previously  She feels well without chest pain or shortness of breath  Given her recent interrogation will up titrate her beta-blocker  I will decrease her calcium channel blocker to give her some blood pressure room  She will go for some additional blood work today to reassess her renal function  She will remain off lisinopril at this time  Assessment  Diagnoses and all orders for this visit:    Atrial fibrillation, unspecified type ([place] [address] )  -     POCT ECG    Essential hypertension  -     POCT ECG    NSTEMI (non-ST elevated myocardial infarction) ([place] [address] )  -     POCT ECG    Hospital discharge follow-up    ICD (implantable cardioverter-defibrillator) discharge    Ventricular tachycardia (HCC)    Hyperlipidemia, unspecified hyperlipidemia type    Other orders  -     furosemide (LASIX) 20 mg tablet  -     cyclobenzaprine (FLEXERIL) 5 mg tablet; cyclobenzaprine 5 mg tablet          Past Medical History:   Diagnosis Date    Arthritis     Atrial fibrillation (HCC)     Atrial fibrillation (HCC)     Breast lump     GERD (gastroesophageal reflux disease)     H/O transfusion 1987    Hepatitis C     resolved    Hepatitis C     Hyperlipidemia     Hypertension     Irregular heart beat     Pacemaker     Sleep apnea     no cpap       Review of Systems   Constitution: Negative for chills  Cardiovascular: Negative for chest pain, claudication, cyanosis, dyspnea on exertion, irregular heartbeat, leg swelling, near-syncope, orthopnea, palpitations, paroxysmal nocturnal dyspnea and syncope  Respiratory: Negative for cough and shortness of breath  Gastrointestinal: Negative for heartburn and nausea  Neurological: Negative for dizziness, focal weakness, headaches, light-headedness and weakness  All other systems reviewed and are negative        Allergies   Allergen Reactions    Iodinated Diagnostic Agents [facility] Todd Philippe       Current Outpatient Medications:     amLODIPine (NORVASC) 10 mg tablet, Take 10 mg by mouth daily  , Disp: , Rfl:     cyclobenzaprine (FLEXERIL) 5 mg tablet, cyclobenzaprine 5 mg tablet, Disp: , Rfl:     famotidine (PEPCID) 20 mg tablet, Take 20 mg by mouth daily, Disp: , Rfl:     metoprolol succinate (TOPROL-XL) 50 mg 24 hr tablet, Take 1 tablet (50 mg total) by mouth every 12 (twelve) hours, Disp: 60 tablet, Rfl: 0    pantoprazole (PROTONIX) 40 mg tablet, take 1 tablet by mouth daily 30 MINUTES BEFORE BREAKFAST, Disp: 30 tablet, Rfl: 6    rivaroxaban (XARELTO) 20 mg tablet, Take 20 mg by mouth daily with breakfast, Disp: , Rfl:     traMADol (ULTRAM) 50 mg tablet, Take 50 mg by mouth every 6 (six) hours as needed for moderate pain, Disp: , Rfl:     furosemide (LASIX) 20 mg tablet, , Disp: , Rfl: 0    nicotine (NICODERM CQ) 14 mg/24hr TD 24 hr patch, Place 1 patch on the skin daily (Patient not taking: Reported on 9/25/2019), Disp: 28 patch, Rfl: 0        Social History     Socioeconomic History    Marital status: /Civil Union     Spouse name: Not on file    Number of children: Not on file    Years of education: Not on file    Highest education level: Not on file   Occupational History    Not on file   Social Needs    Financial resource strain: Not on file    Food insecurity:     Worry: Not on file     Inability: Not on file    Transportation needs:     Medical: Not on file     Non-medical: Not on file   Tobacco Use    Smoking status: Current Every Day Smoker     Packs/day: 0 25     Years: 0 00     Pack years: 0 00     Types: Cigarettes    Smokeless tobacco: Never Used    Tobacco comment: about 3 daily   Substance and Sexual Activity    Alcohol use: No    Drug use: No    Sexual activity: Not on file   Lifestyle    Physical activity:     Days per week: Not on file     Minutes per session: Not on file    Stress: Not on file   Relationships    Social connections: Talks on phone: Not on file     Gets together: Not on file     Attends Jainism service: Not on file     Active member of club or organization: Not on file     Attends meetings of clubs or organizations: Not on file     Relationship status: Not on file    Intimate partner violence:     Fear of current or ex partner: Not on file     Emotionally abused: Not on file     Physically abused: Not on file     Forced sexual activity: Not on file   Other Topics Concern    Not on file   Social History Narrative    disabled       Family History   Problem Relation Age of Onset    Arthritis Family     Cancer Family     Diabetes Family     Hypertension Family        Physical Exam   Constitutional: She is oriented to person, place, and time  No distress  HENT:   Head: Normocephalic and atraumatic  Eyes: Conjunctivae and EOM are normal    Neck: Normal range of motion  Neck supple  Cardiovascular: Normal rate, regular rhythm, normal heart sounds and intact distal pulses  Pulmonary/Chest: Effort normal and breath sounds normal    Abdominal: Soft  Bowel sounds are normal    Musculoskeletal: Normal range of motion  Neurological: She is alert and oriented to person, place, and time  Skin: Skin is warm and dry  She is not diaphoretic  Psychiatric: She has a normal mood and affect  Nursing note and vitals reviewed  Vitals: Blood pressure 134/70, pulse 88, height 5' 7" (1 702 m), weight 107 kg (234 lb 12 8 oz), SpO2 98 %, not currently breastfeeding     Wt Readings from Last 3 Encounters:   09/25/19 107 kg (234 lb 12 8 oz)   09/09/19 105 kg (231 lb 0 7 oz)   01/23/19 118 kg (259 lb 6 4 oz)         Labs & Results:  Lab Results   Component Value Date    WBC 6 49 09/09/2019    HGB 11 8 09/09/2019    HCT 37 4 09/09/2019    MCV 87 09/09/2019     (L) 09/09/2019     No results found for: BNP  No components found for: CHEM  Troponin I   Date Value Ref Range Status   09/10/2019 0 06 (H) <=0 04 ng/mL Final Comment:       Siemens Chemistry analyzer 99% cutoff is > 0 04 ng/mL in network labs     o cTnI 99% cutoff is useful only when applied to patients in the clinical setting of myocardial ischemia   o cTnI 99% cutoff should be interpreted in the context of clinical history, ECG findings and possibly cardiac imaging to establish correct diagnosis  o cTnI 99% cutoff may be suggestive but clearly not indicative of a coronary event without the clinical setting of myocardial ischemia  Results indicate test should be repeated on new specimen collected within 4-6 hours of the original   09/10/2019 0 08 (H) <=0 04 ng/mL Final     Comment:       Siemens Chemistry analyzer 99% cutoff is > 0 04 ng/mL in network labs     o cTnI 99% cutoff is useful only when applied to patients in the clinical setting of myocardial ischemia   o cTnI 99% cutoff should be interpreted in the context of clinical history, ECG findings and possibly cardiac imaging to establish correct diagnosis  o cTnI 99% cutoff may be suggestive but clearly not indicative of a coronary event without the clinical setting of myocardial ischemia  Results indicate test should be repeated on new specimen collected within 4-6 hours of the original   09/09/2019 0 07 (H) <=0 04 ng/mL Final     Comment:       Siemens Chemistry analyzer 99% cutoff is > 0 04 ng/mL in network labs     o cTnI 99% cutoff is useful only when applied to patients in the clinical setting of myocardial ischemia   o cTnI 99% cutoff should be interpreted in the context of clinical history, ECG findings and possibly cardiac imaging to establish correct diagnosis  o cTnI 99% cutoff may be suggestive but clearly not indicative of a coronary event without the clinical setting of myocardial ischemia      Results indicate test should be repeated on new specimen collected within 4-6 hours of the original     Results for orders placed during the hospital encounter of 04/18/19   Echo complete with contrast if indicated    Narrative [facility], [address] [facility]  [phone]    Transthoracic Echocardiogram  2D, M-mode, Doppler, and Color Doppler    Study date:  2019    Patient: Efren Lyle  MR number: [id number]  Account number: [de-identified]  : 1965  Age: 47 years  Gender: Female  Status: Outpatient  Location: [address] [place] [address]  Height: 67 in  Weight: 258 5 lb  BP: 114/ 84 mmHg    Indications: Atrial fibrillation  Diagnoses: I48 0 - Atrial fibrillation    Sonographer:  Kimber Fleischer, RDCS  Primary Physician:  Rosa Jimenez MD  Referring Physician: CHARLOTTE Pizano  Group:  [facility]  RN:  Kenya Vora RN  Interpreting Physician:  Joi Orr MD    SUMMARY    LEFT VENTRICLE:  Systolic function was normal  Ejection fraction was estimated to be 65 %  There were no regional wall motion abnormalities  Septal wall thickness was markedly increased  (2cm)  However the posterior wall was not seen well enough for accurate quantification of wall thickness  There was no dynamic obstruction  Doppler parameters were consistent with abnormal left ventricular relaxation (grade 1 diastolic dysfunction)  HISTORY: PRIOR HISTORY: Hypertension, ventricular tachycardia, internal cardiac defibrillator implant, hypertrophic obstructive cardiomyopathy, NSTEMI, current smoker  PROCEDURE: The study was performed in the [address] [place] [address]  This was a routine study  The transthoracic approach was used  The study included complete 2D imaging, M-mode, complete spectral Doppler, and color Doppler  The  heart rate was 60 bpm, at the start of the study  Images were obtained from the parasternal, apical, subcostal, and suprasternal notch acoustic windows  Intravenous contrast ( 0 8 ml Definity in nss) was administered  Intravenous contrast  was administered to opacify the left ventricle   Echocardiographic views were limited due to poor acoustic window availability, decreased penetration, and lung interference  This was a technically difficult study  LEFT VENTRICLE: Size was normal  Systolic function was normal  Ejection fraction was estimated to be 65 %  There were no regional wall motion abnormalities  Septal wall thickness was markedly increased  (2cm)  However the posterior wall was not seen well enough for accurate quantification of wall thickness  DOPPLER: There was no dynamic obstruction  Doppler parameters were consistent with abnormal left ventricular relaxation (grade 1 diastolic  dysfunction)  RIGHT VENTRICLE: The size was normal  Systolic function was normal  Wall thickness was normal     LEFT ATRIUM: Size was normal     RIGHT ATRIUM: Size was normal     MITRAL VALVE: Valve structure was normal  There was normal leaflet separation  DOPPLER: The transmitral velocity was within the normal range  There was no evidence for stenosis  There was no significant regurgitation  AORTIC VALVE: The valve was trileaflet  Leaflets exhibited normal thickness and normal cuspal separation  DOPPLER: Transaortic velocity was within the normal range  There was no evidence for stenosis  There was no significant  regurgitation  TRICUSPID VALVE: The valve structure was normal  There was normal leaflet separation  DOPPLER: The transtricuspid velocity was within the normal range  There was no evidence for stenosis  There was no significant regurgitation  PULMONIC VALVE: Leaflets exhibited normal thickness, no calcification, and normal cuspal separation  DOPPLER: The transpulmonic velocity was within the normal range  There was no significant regurgitation  PERICARDIUM: There was no pericardial effusion  The pericardium was normal in appearance  AORTA: The root exhibited normal size  SYSTEMIC VEINS: IVC: The inferior vena cava was not well visualized      SYSTEM MEASUREMENT TABLES    2D  %FS: 26 28 %  AV Diam: 3 05 cm  EDV(Teich): 63 61 ml  EF(Cube): 59 94 %  EF(Teich): 52 21 %  ESV(Cube): 22 72 ml  ESV(Teich): 30 4 ml  IVSd: 2 cm  LA Diam: 3 66 cm  LVIDd: 3 84 cm  LVIDs: 2 83 cm  LVPWd: 2 04 cm  SI(Cube): 15 05 ml/m2  SI(Teich): 14 7 ml/m2  SV(Cube): 34 ml  SV(Teich): 33 21 ml    CW  TR MaxPG: 10 5 mmHg  TR Vmax: 1 62 m/s    MM  TAPSE: 2 98 cm    PW  E': 0 06 m/s  E/E': 14 83  MV A Zeferino: 0 86 m/s  MV Dec [place]: 2 4 m/s2  MV DecT: 357 04 ms  MV E Zeferino: 0 86 m/s  MV E/A Ratio: 1    [employer] Accredited Echocardiography Laboratory    Prepared and electronically signed by    Óscar Harmon MD  Signed 18-Apr-2019 12:25:42       No results found for this or any previous visit  This note was completed in part utilizing m-[employer] fluency direct voice recognition software  Grammatical errors, random word insertion, spelling mistakes, and incomplete sentences may be an occasional consequence of the system secondary to software limitations, ambient noise and hardware issues  At the time of dictation, efforts were made to edit, clarify and /or correct errors  Please read the chart carefully and recognize, using context, where substitutions have occurred    If you have any questions or concerns about the context, text or information contained within the body of this dictation, please contact myself, the provider, for further clarification

## 2019-10-09 ENCOUNTER — APPOINTMENT (EMERGENCY)
Dept: RADIOLOGY | Facility: HOSPITAL | Age: 54
DRG: 201 | End: 2019-10-09
Payer: COMMERCIAL

## 2019-10-09 ENCOUNTER — HOSPITAL ENCOUNTER (INPATIENT)
Facility: HOSPITAL | Age: 54
LOS: 1 days | Discharge: HOME | DRG: 201 | End: 2019-10-10
Attending: EMERGENCY MEDICINE | Admitting: FAMILY MEDICINE
Payer: COMMERCIAL

## 2019-10-09 DIAGNOSIS — I48.91 RAPID ATRIAL FIBRILLATION (HCC): Primary | ICD-10-CM

## 2019-10-09 DIAGNOSIS — N17.9 AKI (ACUTE KIDNEY INJURY) (HCC): ICD-10-CM

## 2019-10-09 DIAGNOSIS — N17.9 ACUTE-ON-CHRONIC KIDNEY INJURY (HCC): ICD-10-CM

## 2019-10-09 DIAGNOSIS — K85.90 PANCREATITIS: ICD-10-CM

## 2019-10-09 DIAGNOSIS — M79.89 LEG SWELLING: ICD-10-CM

## 2019-10-09 DIAGNOSIS — R07.9 CHEST PAIN: ICD-10-CM

## 2019-10-09 DIAGNOSIS — N18.9 ACUTE-ON-CHRONIC KIDNEY INJURY (HCC): ICD-10-CM

## 2019-10-09 LAB
ALBUMIN SERPL BCP-MCNC: 4.2 G/DL (ref 3.5–5)
ALP SERPL-CCNC: 94 U/L (ref 46–116)
ALT SERPL W P-5'-P-CCNC: 26 U/L (ref 12–78)
ANION GAP SERPL CALCULATED.3IONS-SCNC: 9 MMOL/L (ref 4–13)
AST SERPL W P-5'-P-CCNC: 20 U/L (ref 5–45)
BASOPHILS # BLD AUTO: 0.05 THOUSANDS/ΜL (ref 0–0.1)
BASOPHILS NFR BLD AUTO: 0 % (ref 0–1)
BILIRUB SERPL-MCNC: 0.3 MG/DL (ref 0.2–1)
BUN SERPL-MCNC: 33 MG/DL (ref 5–25)
CALCIUM SERPL-MCNC: 9.1 MG/DL (ref 8.3–10.1)
CHLORIDE SERPL-SCNC: 102 MMOL/L (ref 100–108)
CO2 SERPL-SCNC: 27 MMOL/L (ref 21–32)
CREAT SERPL-MCNC: 2.03 MG/DL (ref 0.6–1.3)
EOSINOPHIL # BLD AUTO: 0.1 THOUSAND/ΜL (ref 0–0.61)
EOSINOPHIL NFR BLD AUTO: 1 % (ref 0–6)
ERYTHROCYTE [DISTWIDTH] IN BLOOD BY AUTOMATED COUNT: 13.7 % (ref 11.6–15.1)
GFR SERPL CREATININE-BSD FRML MDRD: 27 ML/MIN/1.73SQ M
GLUCOSE SERPL-MCNC: 131 MG/DL (ref 65–140)
HCT VFR BLD AUTO: 43.1 % (ref 34.8–46.1)
HGB BLD-MCNC: 13.9 G/DL (ref 11.5–15.4)
IMM GRANULOCYTES # BLD AUTO: 0.11 THOUSAND/UL (ref 0–0.2)
IMM GRANULOCYTES NFR BLD AUTO: 1 % (ref 0–2)
LIPASE SERPL-CCNC: 959 U/L (ref 73–393)
LYMPHOCYTES # BLD AUTO: 2.27 THOUSANDS/ΜL (ref 0.6–4.47)
LYMPHOCYTES NFR BLD AUTO: 19 % (ref 14–44)
MAGNESIUM SERPL-MCNC: 2 MG/DL (ref 1.6–2.6)
MCH RBC QN AUTO: 27.5 PG (ref 26.8–34.3)
MCHC RBC AUTO-ENTMCNC: 32.3 G/DL (ref 31.4–37.4)
MCV RBC AUTO: 85 FL (ref 82–98)
MONOCYTES # BLD AUTO: 0.6 THOUSAND/ΜL (ref 0.17–1.22)
MONOCYTES NFR BLD AUTO: 5 % (ref 4–12)
NEUTROPHILS # BLD AUTO: 8.6 THOUSANDS/ΜL (ref 1.85–7.62)
NEUTS SEG NFR BLD AUTO: 74 % (ref 43–75)
NRBC BLD AUTO-RTO: 0 /100 WBCS
NT-PROBNP SERPL-MCNC: 4548 PG/ML
PLATELET # BLD AUTO: 199 THOUSANDS/UL (ref 149–390)
PMV BLD AUTO: 11.1 FL (ref 8.9–12.7)
POTASSIUM SERPL-SCNC: 4.6 MMOL/L (ref 3.5–5.3)
PROT SERPL-MCNC: 8.8 G/DL (ref 6.4–8.2)
RBC # BLD AUTO: 5.06 MILLION/UL (ref 3.81–5.12)
SODIUM SERPL-SCNC: 138 MMOL/L (ref 136–145)
TROPONIN I SERPL-MCNC: 0.04 NG/ML
TSH SERPL DL<=0.05 MIU/L-ACNC: 1.44 UIU/ML (ref 0.36–3.74)
WBC # BLD AUTO: 11.73 THOUSAND/UL (ref 4.31–10.16)

## 2019-10-09 PROCEDURE — 84443 ASSAY THYROID STIM HORMONE: CPT | Performed by: EMERGENCY MEDICINE

## 2019-10-09 PROCEDURE — 71045 X-RAY EXAM CHEST 1 VIEW: CPT

## 2019-10-09 PROCEDURE — 84484 ASSAY OF TROPONIN QUANT: CPT | Performed by: EMERGENCY MEDICINE

## 2019-10-09 PROCEDURE — 93005 ELECTROCARDIOGRAM TRACING: CPT

## 2019-10-09 PROCEDURE — 96376 TX/PRO/DX INJ SAME DRUG ADON: CPT

## 2019-10-09 PROCEDURE — 36415 COLL VENOUS BLD VENIPUNCTURE: CPT | Performed by: EMERGENCY MEDICINE

## 2019-10-09 PROCEDURE — 80053 COMPREHEN METABOLIC PANEL: CPT | Performed by: EMERGENCY MEDICINE

## 2019-10-09 PROCEDURE — 99285 EMERGENCY DEPT VISIT HI MDM: CPT

## 2019-10-09 PROCEDURE — 83735 ASSAY OF MAGNESIUM: CPT | Performed by: EMERGENCY MEDICINE

## 2019-10-09 PROCEDURE — 83880 ASSAY OF NATRIURETIC PEPTIDE: CPT | Performed by: EMERGENCY MEDICINE

## 2019-10-09 PROCEDURE — 96365 THER/PROPH/DIAG IV INF INIT: CPT

## 2019-10-09 PROCEDURE — 83690 ASSAY OF LIPASE: CPT | Performed by: EMERGENCY MEDICINE

## 2019-10-09 PROCEDURE — 99291 CRITICAL CARE FIRST HOUR: CPT | Performed by: EMERGENCY MEDICINE

## 2019-10-09 PROCEDURE — 85025 COMPLETE CBC W/AUTO DIFF WBC: CPT | Performed by: EMERGENCY MEDICINE

## 2019-10-09 PROCEDURE — 87040 BLOOD CULTURE FOR BACTERIA: CPT | Performed by: EMERGENCY MEDICINE

## 2019-10-09 RX ORDER — ASPIRIN 81 MG/1
162 TABLET, CHEWABLE ORAL ONCE
Status: COMPLETED | OUTPATIENT
Start: 2019-10-09 | End: 2019-10-09

## 2019-10-09 RX ORDER — 0.9 % SODIUM CHLORIDE 0.9 %
3 VIAL (ML) INJECTION AS NEEDED
Status: DISCONTINUED | OUTPATIENT
Start: 2019-10-09 | End: 2019-10-10 | Stop reason: HOSPADM

## 2019-10-09 RX ORDER — DILTIAZEM HYDROCHLORIDE 5 MG/ML
10 INJECTION INTRAVENOUS ONCE
Status: COMPLETED | OUTPATIENT
Start: 2019-10-09 | End: 2019-10-09

## 2019-10-09 RX ORDER — SODIUM CHLORIDE 9 MG/ML
125 INJECTION, SOLUTION INTRAVENOUS CONTINUOUS
Status: DISCONTINUED | OUTPATIENT
Start: 2019-10-09 | End: 2019-10-09

## 2019-10-09 RX ADMIN — DILTIAZEM HYDROCHLORIDE 10 MG/HR: 5 INJECTION INTRAVENOUS at 22:40

## 2019-10-09 RX ADMIN — NITROGLYCERIN 0.5 INCH: 20 OINTMENT TOPICAL at 23:23

## 2019-10-09 RX ADMIN — ASPIRIN 81 MG 162 MG: 81 TABLET ORAL at 22:34

## 2019-10-09 RX ADMIN — DILTIAZEM HYDROCHLORIDE 10 MG: 5 INJECTION INTRAVENOUS at 22:29

## 2019-10-09 NOTE — LETTER
October 10, 2019     Kash Gal[place] [id number]    Patient: Konnie Lefort   YOB: 1965   Date of Visit: 10/9/2019     Lety Nur DO   Physician   Hospitalist   Discharge Summary   Signed   Date of Service:  10/10/2019 11:29 AM               Signed             Show:Clear all  [x]Manual[x]Template[x]Copied    Added by:  [x]Jayson Fang DO    []Shaanver for details        Discharge- Konnie Lefort 1965, 47 y o  female MRN: [id number]     Unit/Bed#: -01 Encounter: [id number]     Primary Care Provider: Lulu Negron MD   Date and time admitted to hospital: 10/9/2019 10:14 PM           Elevated lipase  Assessment & Plan  · Lipase 959  Uncertain significance  No abdominal pain      DANIELA (acute kidney injury) ([facility] [address] )  Assessment & Plan  · DANIELA superimposed on CKD III  · Creatinine was 2 03 on admission  Baseline around 1 4  Most likely prerenal in nature due to dehydration  Creatinine came down to 1 72 with IV fluids  Patient very anxious to go home  Will discharge home and will have her hold Lasix for next three days  Afterward she can take it as-needed      History of ventricular tachycardia ([facility] [address] ) with ICD  Assessment & Plan  · Has ICD  Recent interrogation September 2019: appropriately functioning  · Continue outpatient follow-up     Essential hypertension  Assessment & Plan  · Blood pressure reviewed and acceptable  · Continue amlodipine, metoprolol     * Atrial fibrillation (HCC)  Assessment & Plan  · Presents with 2 hour onset of atrial fibrillation, rate 160s  · Possibly due to dehydration  Converted to NSR with IV fluids and with Cardizem drip which was subsequently titrated off  Continue Toprol XL          Discharge Summary - [place] [id number] Internal Medicine     Patient Information: Konnie Lefort 47 y o  female MRN: [id number]  Unit/Bed#: -01 Encounter: [id number]     Discharging Physician / Practitioner: Lety Nur DO  PCP: Tony Gaytan Rowena Aranda MD  Admission Date: 10/9/2019  Discharge Date: 10/10/19     Disposition:      Home      Reason for Admission: a-fib w/ RVR, DANIELA     Discharge Diagnoses:      Principal Problem:    Atrial fibrillation ([facility] [address] )  Active Problems:    Essential hypertension    History of ventricular tachycardia (HCC) with ICD    DANIELA (acute kidney injury) ([facility] [address] )    Elevated lipase  Resolved Problems:    * No resolved hospital problems  *        Consultations During Hospital Stay:  · None     Procedures Performed:      · None     Significant Findings / Test Results:      X-ray chest 1 view portable [[id number]] Collected: 10/10/19 0831   Order Status: Completed Updated: 10/10/19 0832   Narrative:     CHEST     INDICATION:   chest pain  COMPARISON:  9/9/2019    EXAM PERFORMED/VIEWS:  TN CHEST PORTABLE      FINDINGS:  Left-sided chest wall pacemaker is identified   Pacemaker leads are intact  Cardiomediastinal silhouette appears unremarkable  The lungs are clear   No pneumothorax or pleural effusion  Osseous structures appear within normal limits for patient age  Impression:       No acute cardiopulmonary disease       ·       Incidental Findings:   · None      Test Results Pending at Discharge (will require follow up): · None     Outpatient Tests Requested:  · BMP     Complications:  None     Hospital Course:      Patient was admitted for a-fib w/ RVR and DANIELA superimposed on CKD  Her baseline creatinine is 1 4 but it was 2 03 when she came in  Her HR was in the 160s  She was started on IV fluids and Cardizem drip  Her home Toprol XL was resumed  She converted to NSR and Cardizem drip was stopped  Her creatinine came down to 1 72  She was very anxious to go home  I instructed her to not take Lasix for 3 days, to take it on an as-needed basis afterward, to have a BMP drawn on Monday next week, and to follow-up with her PCP in 1-2 weeks    She expressed understanding      Condition at Discharge: stable      Discharge Day Visit / Exam:      * Please refer to separate progress note for these details *     Discussion with Family: Discussed with significant other at bedside        Discharge instructions/Information to patient and family:   See after visit summary for information provided to patient and family        Provisions for Follow-Up Care:  See after visit summary for information related to follow-up care and any pertinent home health orders        Planned Readmission: None     Discharge Statement:  I spent 30 minutes discharging the patient  This time was spent on the day of discharge  I had direct contact with the patient on the day of discharge  Greater than 50% of the total time was spent examining patient, answering all patient questions, arranging and discussing plan of care with patient as well as directly providing post-discharge instructions  Additional time then spent on discharge activities      Discharge Medications:  See after visit summary for reconciled discharge medications provided to patient and family        ** Please Note: This note has been constructed using a voice recognition system   **

## 2019-10-10 VITALS
SYSTOLIC BLOOD PRESSURE: 155 MMHG | RESPIRATION RATE: 18 BRPM | WEIGHT: 227.07 LBS | HEIGHT: 67 IN | HEART RATE: 61 BPM | DIASTOLIC BLOOD PRESSURE: 89 MMHG | TEMPERATURE: 98.4 F | BODY MASS INDEX: 35.64 KG/M2 | OXYGEN SATURATION: 95 %

## 2019-10-10 DIAGNOSIS — I42.2 HYPERTROPHIC CARDIOMYOPATHY (HCC): ICD-10-CM

## 2019-10-10 PROBLEM — R74.8 ELEVATED LIPASE: Status: ACTIVE | Noted: 2019-10-10

## 2019-10-10 LAB
ANION GAP SERPL CALCULATED.3IONS-SCNC: 7 MMOL/L (ref 4–13)
ATRIAL RATE: 192 BPM
BASOPHILS # BLD AUTO: 0.03 THOUSANDS/ΜL (ref 0–0.1)
BASOPHILS NFR BLD AUTO: 0 % (ref 0–1)
BUN SERPL-MCNC: 30 MG/DL (ref 5–25)
CALCIUM SERPL-MCNC: 8.7 MG/DL (ref 8.3–10.1)
CHLORIDE SERPL-SCNC: 107 MMOL/L (ref 100–108)
CO2 SERPL-SCNC: 22 MMOL/L (ref 21–32)
CREAT SERPL-MCNC: 1.72 MG/DL (ref 0.6–1.3)
EOSINOPHIL # BLD AUTO: 0.08 THOUSAND/ΜL (ref 0–0.61)
EOSINOPHIL NFR BLD AUTO: 1 % (ref 0–6)
ERYTHROCYTE [DISTWIDTH] IN BLOOD BY AUTOMATED COUNT: 13.6 % (ref 11.6–15.1)
GFR SERPL CREATININE-BSD FRML MDRD: 33 ML/MIN/1.73SQ M
GLUCOSE SERPL-MCNC: 112 MG/DL (ref 65–140)
HCT VFR BLD AUTO: 39.7 % (ref 34.8–46.1)
HGB BLD-MCNC: 12.4 G/DL (ref 11.5–15.4)
IMM GRANULOCYTES # BLD AUTO: 0.04 THOUSAND/UL (ref 0–0.2)
IMM GRANULOCYTES NFR BLD AUTO: 1 % (ref 0–2)
LYMPHOCYTES # BLD AUTO: 1.76 THOUSANDS/ΜL (ref 0.6–4.47)
LYMPHOCYTES NFR BLD AUTO: 21 % (ref 14–44)
MCH RBC QN AUTO: 27.6 PG (ref 26.8–34.3)
MCHC RBC AUTO-ENTMCNC: 31.2 G/DL (ref 31.4–37.4)
MCV RBC AUTO: 88 FL (ref 82–98)
MONOCYTES # BLD AUTO: 0.52 THOUSAND/ΜL (ref 0.17–1.22)
MONOCYTES NFR BLD AUTO: 6 % (ref 4–12)
NEUTROPHILS # BLD AUTO: 6.04 THOUSANDS/ΜL (ref 1.85–7.62)
NEUTS SEG NFR BLD AUTO: 71 % (ref 43–75)
NRBC BLD AUTO-RTO: 0 /100 WBCS
PLATELET # BLD AUTO: 148 THOUSANDS/UL (ref 149–390)
PMV BLD AUTO: 11.3 FL (ref 8.9–12.7)
POTASSIUM SERPL-SCNC: 4.5 MMOL/L (ref 3.5–5.3)
QRS AXIS: -45 DEGREES
QRSD INTERVAL: 140 MS
QT INTERVAL: 300 MS
QTC INTERVAL: 475 MS
RBC # BLD AUTO: 4.5 MILLION/UL (ref 3.81–5.12)
SODIUM SERPL-SCNC: 136 MMOL/L (ref 136–145)
T WAVE AXIS: 114 DEGREES
VENTRICULAR RATE: 151 BPM
WBC # BLD AUTO: 8.47 THOUSAND/UL (ref 4.31–10.16)

## 2019-10-10 PROCEDURE — 80048 BASIC METABOLIC PNL TOTAL CA: CPT | Performed by: NURSE PRACTITIONER

## 2019-10-10 PROCEDURE — 93010 ELECTROCARDIOGRAM REPORT: CPT | Performed by: INTERNAL MEDICINE

## 2019-10-10 PROCEDURE — 85025 COMPLETE CBC W/AUTO DIFF WBC: CPT | Performed by: NURSE PRACTITIONER

## 2019-10-10 PROCEDURE — 99236 HOSP IP/OBS SAME DATE HI 85: CPT | Performed by: FAMILY MEDICINE

## 2019-10-10 PROCEDURE — 90682 RIV4 VACC RECOMBINANT DNA IM: CPT | Performed by: FAMILY MEDICINE

## 2019-10-10 RX ORDER — FAMOTIDINE 20 MG/1
20 TABLET, FILM COATED ORAL DAILY
Status: DISCONTINUED | OUTPATIENT
Start: 2019-10-10 | End: 2019-10-10 | Stop reason: HOSPADM

## 2019-10-10 RX ORDER — AMLODIPINE BESYLATE 5 MG/1
5 TABLET ORAL DAILY
Status: DISCONTINUED | OUTPATIENT
Start: 2019-10-10 | End: 2019-10-10 | Stop reason: HOSPADM

## 2019-10-10 RX ORDER — FUROSEMIDE 20 MG/1
20 TABLET ORAL DAILY
Status: DISCONTINUED | OUTPATIENT
Start: 2019-10-10 | End: 2019-10-10 | Stop reason: HOSPADM

## 2019-10-10 RX ORDER — FUROSEMIDE 20 MG/1
20 TABLET ORAL DAILY PRN
Refills: 0 | Status: SHIPPED | OUTPATIENT
Start: 2019-10-10 | End: 2020-08-18

## 2019-10-10 RX ORDER — PANTOPRAZOLE SODIUM 40 MG/1
40 TABLET, DELAYED RELEASE ORAL
Status: DISCONTINUED | OUTPATIENT
Start: 2019-10-10 | End: 2019-10-10 | Stop reason: HOSPADM

## 2019-10-10 RX ORDER — NICOTINE 21 MG/24HR
14 PATCH, TRANSDERMAL 24 HOURS TRANSDERMAL DAILY
Status: DISCONTINUED | OUTPATIENT
Start: 2019-10-10 | End: 2019-10-10 | Stop reason: HOSPADM

## 2019-10-10 RX ORDER — SODIUM CHLORIDE 9 MG/ML
50 INJECTION, SOLUTION INTRAVENOUS CONTINUOUS
Status: DISCONTINUED | OUTPATIENT
Start: 2019-10-10 | End: 2019-10-10 | Stop reason: HOSPADM

## 2019-10-10 RX ORDER — FUROSEMIDE 20 MG/1
TABLET ORAL
Qty: 30 TABLET | Refills: 3 | Status: SHIPPED | OUTPATIENT
Start: 2019-10-10 | End: 2020-04-18

## 2019-10-10 RX ORDER — ACETAMINOPHEN 325 MG/1
650 TABLET ORAL EVERY 6 HOURS PRN
Status: DISCONTINUED | OUTPATIENT
Start: 2019-10-10 | End: 2019-10-10 | Stop reason: HOSPADM

## 2019-10-10 RX ADMIN — ACETAMINOPHEN 650 MG: 325 TABLET ORAL at 08:12

## 2019-10-10 RX ADMIN — FAMOTIDINE 20 MG: 20 TABLET ORAL at 08:09

## 2019-10-10 RX ADMIN — FUROSEMIDE 20 MG: 20 TABLET ORAL at 08:09

## 2019-10-10 RX ADMIN — PANTOPRAZOLE SODIUM 40 MG: 40 TABLET, DELAYED RELEASE ORAL at 05:23

## 2019-10-10 RX ADMIN — ACETAMINOPHEN 650 MG: 325 TABLET ORAL at 02:22

## 2019-10-10 RX ADMIN — NICOTINE 14 MG: 14 PATCH TRANSDERMAL at 08:13

## 2019-10-10 RX ADMIN — INFLUENZA A VIRUS A/BRISBANE/02/2018 (H1N1) RECOMBINANT HEMAGGLUTININ ANTIGEN, INFLUENZA A VIRUS A/KANSAS/14/2017 (H3N2) RECOMBINANT HEMAGGLUTININ ANTIGEN, INFLUENZA B VIRUS B/PHUKET/3073/2013 RECOMBINANT HEMAGGLUTININ ANTIGEN, AND INFLUENZA B VIRUS B/MARYLAND/15/2016 RECOMBINANT HEMAGGLUTININ ANTIGEN 0.5 ML: 45; 45; 45; 45 INJECTION INTRAMUSCULAR at 10:31

## 2019-10-10 RX ADMIN — AMLODIPINE BESYLATE 5 MG: 5 TABLET ORAL at 08:09

## 2019-10-10 RX ADMIN — SODIUM CHLORIDE 50 ML/HR: 0.9 INJECTION, SOLUTION INTRAVENOUS at 05:18

## 2019-10-10 RX ADMIN — METOPROLOL SUCCINATE 75 MG: 50 TABLET, EXTENDED RELEASE ORAL at 08:08

## 2019-10-10 RX ADMIN — RIVAROXABAN 20 MG: 20 TABLET, FILM COATED ORAL at 08:09

## 2019-10-10 NOTE — NURSING NOTE
Pt arrived to floor on cardizem drip  Pt's HR was consistently 59-60  Cardizem drip was held per nursing judgment  SLIM made aware

## 2019-10-10 NOTE — ASSESSMENT & PLAN NOTE
· Presents with 2 hour onset of atrial fibrillation, rate 160s  · Cardizem drip initiated in the ER  · Telemetry monitoring  · Anticoagulated with Xarelto  · Continue metoprolol

## 2019-10-10 NOTE — PLAN OF CARE
Problem: PAIN - ADULT  Goal: Verbalizes/displays adequate comfort level or baseline comfort level  Description  Interventions:  - Encourage patient to monitor pain and request assistance  - Assess pain using appropriate pain scale  - Administer analgesics based on type and severity of pain and evaluate response  - Implement non-pharmacological measures as appropriate and evaluate response  - Consider cultural and social influences on pain and pain management  - Notify physician/advanced practitioner if interventions unsuccessful or patient reports new pain  Outcome: Progressing     Problem: INFECTION - ADULT  Goal: Absence or prevention of progression during hospitalization  Description  INTERVENTIONS:  - Assess and monitor for signs and symptoms of infection  - Monitor lab/diagnostic results  - Monitor all insertion sites, i e  indwelling lines, tubes, and drains  - Monitor endotracheal if appropriate and nasal secretions for changes in amount and color  - [place] appropriate cooling/warming therapies per order  - Administer medications as ordered  - Instruct and encourage patient and family to use good hand hygiene technique  - Identify and instruct in appropriate isolation precautions for identified infection/condition  Outcome: Progressing     Problem: SAFETY ADULT  Goal: Patient will remain free of falls  Description  INTERVENTIONS:  - Assess patient frequently for physical needs  -  Identify cognitive and physical deficits and behaviors that affect risk of falls    -  [place] fall precautions as indicated by assessment   - Educate patient/family on patient safety including physical limitations  - Instruct patient to call for assistance with activity based on assessment  - Modify environment to reduce risk of injury  - Consider OT/PT consult to assist with strengthening/mobility  Outcome: Progressing

## 2019-10-10 NOTE — ED PROVIDER NOTES
History  Chief Complaint   Patient presents with    Chest Pain     Pt arrives with c/o midstrnum CP and SOB that woke her up about 2 hours ago  Denies N/V/D  Pt reports recent cough, Hx of afib, has defib/pacemaker     Patient is a 47year old female with a couple of hours of worsening constant mid sternal chest pain and sob and cough  No fever  No N/V  Has a h/o a  Fib and has a pacemaker  No travel  Is on eliquis  (+) smokes  Was last seen in this ED on 19 for palpitations  [facility] website checked on this patient and last Rx filled was on 19 for tramadol for 30 day supply  Patient needed my immediate attention  Has had hysterectomy  History provided by:  Patient and spouse   used: No    Chest Pain   Associated symptoms: cough and shortness of breath    Associated symptoms: no fever, no nausea and not vomiting        Prior to Admission Medications   Prescriptions Last Dose Informant Patient Reported? Taking?    amLODIPine (NORVASC) 10 mg tablet   No No   Si/2 tab daily   cyclobenzaprine (FLEXERIL) 5 mg tablet   Yes No   Sig: cyclobenzaprine 5 mg tablet   famotidine (PEPCID) 20 mg tablet  Self Yes No   Sig: Take 20 mg by mouth daily   furosemide (LASIX) 20 mg tablet   Yes No   metoprolol succinate (TOPROL-XL) 50 mg 24 hr tablet   No No   Sig: Take one and 1/2 tabs  Twice a day ( 75 mg BID)   nicotine (NICODERM CQ) 14 mg/24hr TD 24 hr patch  Self No No   Sig: Place 1 patch on the skin daily   Patient not taking: Reported on 2019   pantoprazole (PROTONIX) 40 mg tablet  Self No No   Sig: take 1 tablet by mouth daily 30 MINUTES BEFORE BREAKFAST   rivaroxaban (XARELTO) 20 mg tablet  Self Yes No   Sig: Take 20 mg by mouth daily with breakfast   traMADol (ULTRAM) 50 mg tablet  Self Yes No   Sig: Take 50 mg by mouth every 6 (six) hours as needed for moderate pain      Facility-Administered Medications: None       Past Medical History:   Diagnosis Date    Arthritis     Atrial fibrillation ([address] )     Atrial fibrillation (HCC)     Breast lump     GERD (gastroesophageal reflux disease)     H/O transfusion 1987    Hepatitis C     resolved    Hepatitis C     Hyperlipidemia     Hypertension     Irregular heart beat     Pacemaker     Sleep apnea     no cpap       Past Surgical History:   Procedure Laterality Date    CARDIAC CATHETERIZATION  01/07/2019    CARDIAC DEFIBRILLATOR PLACEMENT      CARDIAC PACEMAKER PLACEMENT  2016    AFIB     CHOLECYSTECTOMY      COLONOSCOPY      ELBOW SURGERY      HYSTERECTOMY      HYSTERECTOMY      JOINT REPLACEMENT Left 2015    TKR    KNEE SURGERY Left     MI ESOPHAGOGASTRODUODENOSCOPY TRANSORAL DIAGNOSTIC N/A 5/2/2018    Procedure: ESOPHAGOGASTRODUODENOSCOPY (EGD); Surgeon: Alexandru Kendall MD;  [facility]: [facility]; Service: Gastroenterology    MI LARYNGOSCOPY,DIRCT,OP [facility] N/A 8/10/2018    Procedure: MICRO DIRECT LARYNGOSCOPY , EXCISION OF POLYPS, KTP LASER;  Surgeon: Glenn Oleary MD;  [facility];  Service: ENT    REPLACEMENT TOTAL KNEE Left     THROAT SURGERY      polyps removed       Family History   Problem Relation Age of Onset    Arthritis Family     Cancer Family     Diabetes Family     Hypertension Family      I have reviewed and agree with the history as documented  Social History     Tobacco Use    Smoking status: Current Every Day Smoker     Packs/day: 0 50     Years: 0 00     Pack years: 0 00     Types: Cigarettes    Smokeless tobacco: Never Used    Tobacco comment: about 3 daily   Substance Use Topics    Alcohol use: No    Drug use: No        Review of Systems   Constitutional: Negative for fever  Respiratory: Positive for cough and shortness of breath  Cardiovascular: Positive for chest pain  Gastrointestinal: Negative for nausea and vomiting  All other systems reviewed and are negative        Physical Exam  Physical Exam   Constitutional: She is oriented to person, place, and time  She appears well-developed and well-nourished  She appears distressed (moderate)  HENT:   Head: Normocephalic and atraumatic  Mucous membranes moist     Eyes: No scleral icterus  Neck: No JVD present  No tracheal deviation present  Cardiovascular: Normal heart sounds  No murmur heard  Irregularly irregular tachycardia  Pulmonary/Chest: Effort normal and breath sounds normal  No stridor  No respiratory distress  She has no wheezes  She has no rales  She exhibits no tenderness  Abdominal: Soft  Bowel sounds are normal  She exhibits no distension  There is no tenderness  Musculoskeletal: She exhibits no edema or deformity  Neurological: She is alert and oriented to person, place, and time  Skin: Skin is warm and dry  No rash noted  Psychiatric: She has a normal mood and affect  Nursing note and vitals reviewed        Vital Signs  ED Triage Vitals [10/09/19 2216]   Temperature Pulse Respirations Blood Pressure SpO2   97 9 °F (36 6 °C) 97 18 170/93 98 %      Temp Source Heart Rate Source Patient Position - Orthostatic VS BP Location FiO2 (%)   Oral Monitor Lying Right arm --      Pain Score       9           Vitals:    10/09/19 2216   BP: 170/93   Pulse: 97   Patient Position - Orthostatic VS: Lying         Visual Acuity      ED Medications  Medications   sodium chloride (PF) 0 9 % injection 3 mL (has no administration in time range)   nitroglycerin (NITRO-BID) 2 % TD ointment 0 5 inch (has no administration in time range)   diltiazem (CARDIZEM) injection 10 mg (10 mg Intravenous Given 10/9/19 2229)   diltiazem (CARDIZEM) 125 mg in sodium chloride 0 9 % 125 mL infusion (15 mg/hr Intravenous Rate/Dose Change 10/9/19 2310)   aspirin chewable tablet 162 mg (162 mg Oral Given 10/9/19 2234)       Diagnostic Studies  Results Reviewed     Procedure Component Value Units Date/Time    TSH, 3rd generation with Free T4 reflex [[id number]]  (Normal) Collected:  10/09/19 2229    Lab Status:  Final result Specimen:  Blood from Arm, Left Updated:  10/09/19 2300     TSH 3RD GENERATON 1 435 uIU/mL     Narrative:       Patients undergoing fluorescein dye angiography may retain small amounts of fluorescein in the body for 48-72 hours post procedure  Samples containing fluorescein can produce falsely depressed TSH values  If the patient had this procedure,a specimen should be resubmitted post fluorescein clearance  Lipase [[id number]]  (Abnormal) Collected:  10/09/19 2229    Lab Status:  Final result Specimen:  Blood from Arm, Left Updated:  10/09/19 2300     Lipase 959 u/L     Magnesium [[id number]]  (Normal) Collected:  10/09/19 2229    Lab Status:  Final result Specimen:  Blood from Arm, Left Updated:  10/09/19 2300     Magnesium 2 0 mg/dL     NT-BNP PRO [[id number]]  (Abnormal) Collected:  10/09/19 2229    Lab Status:  Final result Specimen:  Blood from Arm, Left Updated:  10/09/19 2300     NT-proBNP 4,548 pg/mL     Blood culture #1 [[id number]] Collected:  10/09/19 2249    Lab Status:   In process Specimen:  Blood from Hand, Right Updated:  10/09/19 2253    Troponin I [[id number]]  (Normal) Collected:  10/09/19 2229    Lab Status:  Final result Specimen:  Blood from Arm, Left Updated:  10/09/19 2253     Troponin I 0 04 ng/mL     Comprehensive metabolic panel [[id number]]  (Abnormal) Collected:  10/09/19 2229    Lab Status:  Final result Specimen:  Blood from Arm, Left Updated:  10/09/19 2251     Sodium 138 mmol/L      Potassium 4 6 mmol/L      Chloride 102 mmol/L      CO2 27 mmol/L      ANION GAP 9 mmol/L      BUN 33 mg/dL      Creatinine 2 03 mg/dL      Glucose 131 mg/dL      Calcium 9 1 mg/dL      AST 20 U/L      ALT 26 U/L      Alkaline Phosphatase 94 U/L      Total Protein 8 8 g/dL      Albumin 4 2 g/dL      Total Bilirubin 0 30 mg/dL      eGFR 27 ml/min/1 73sq m     Narrative:       Issac guidelines for Chronic Kidney Disease (CKD):     Stage 1 with normal or high GFR (GFR > 90 mL/min/1 73 square meters)    Stage 2 Mild CKD (GFR = 60-89 mL/min/1 73 square meters)    Stage 3A Moderate CKD (GFR = 45-59 mL/min/1 73 square meters)    Stage 3B Moderate CKD (GFR = 30-44 mL/min/1 73 square meters)    Stage 4 Severe CKD (GFR = 15-29 mL/min/1 73 square meters)    Stage 5 End Stage CKD (GFR <15 mL/min/1 73 square meters)  Note: GFR calculation is accurate only with a steady state creatinine    Blood culture #2 [[id number]] Collected:  10/09/19 2237    Lab Status: In process Specimen:  Blood from Arm, Left Updated:  10/09/19 2239    CBC and differential [[id number]]  (Abnormal) Collected:  10/09/19 2229    Lab Status:  Final result Specimen:  Blood from Arm, Left Updated:  10/09/19 2239     WBC 11 73 Thousand/uL      RBC 5 06 Million/uL      Hemoglobin 13 9 g/dL      Hematocrit 43 1 %      MCV 85 fL      MCH 27 5 pg      MCHC 32 3 g/dL      RDW 13 7 %      MPV 11 1 fL      Platelets 168 Thousands/uL      nRBC 0 /100 WBCs      Neutrophils Relative 74 %      Immat GRANS % 1 %      Lymphocytes Relative 19 %      Monocytes Relative 5 %      Eosinophils Relative 1 %      Basophils Relative 0 %      Neutrophils Absolute 8 60 Thousands/µL      Immature Grans Absolute 0 11 Thousand/uL      Lymphocytes Absolute 2 27 Thousands/µL      Monocytes Absolute 0 60 Thousand/µL      Eosinophils Absolute 0 10 Thousand/µL      Basophils Absolute 0 05 Thousands/µL                  X-ray chest 1 view portable   ED Interpretation by Rosemary Carter MD (10/09 4986)   PPM and no acute disease read by me                    Procedures  ECG 12 Lead Documentation Only  Date/Time: 10/9/2019 10:23 PM  Performed by: Rosemary Carter MD  Authorized by: Rosemary Carter MD     Indications / Diagnosis:  Chest pain, sob  ECG reviewed by me, the ED Provider: yes    Patient location:  ED  Previous ECG:     Previous ECG:  Compared to current    Comparison ECG info:  9/9/19    Similarity:  Changes noted (rapid a  fib now)  Rate:     ECG rate:  151    ECG rate assessment: tachycardic    Rhythm:     Rhythm: atrial fibrillation    Ectopy:     Ectopy: none    Conduction:     Conduction: abnormal      Abnormal conduction: complete RBBB, LAFB and bifascicular block    ST segments:     ST segments:  Non-specific  T waves:     T waves: non-specific    Other findings:     Other findings: LVH with strain      CriticalCare Time  Performed by: Yanet Flores MD  Authorized by: Yanet Flores MD     Critical care provider statement:     Critical care time (minutes):  35    Critical care time was exclusive of:  Separately billable procedures and treating other patients    Critical care was necessary to treat or prevent imminent or life-threatening deterioration of the following conditions: rapid atrial fibrillation  Critical care was time spent personally by me on the following activities:  Obtaining history from patient or surrogate, development of treatment plan with patient or surrogate, examination of patient, evaluation of patient's response to treatment, ordering and performing treatments and interventions, ordering and review of laboratory studies, ordering and review of radiographic studies, re-evaluation of patient's condition and review of old charts    I assumed direction of critical care for this patient from another provider in my specialty: no             ED Course  ED Course as of Oct 09 2322   Wed Oct 09, 2019   2234 EKG d/w patient and  with patient's permission  2248 Pacemaker interrogated  HR decreased to 110s with IV cardizem  [address] d/w patient and   Patient denies etoh use   Abdomen nontender on re-exam              HEART Risk Score      Most Recent Value   History  2 Filed at: 10/09/2019 2254   ECG  1 Filed at: 10/09/2019 2254   Age  1 Filed at: 10/09/2019 2254   Risk Factors  2 Filed at: 10/09/2019 2254   Troponin  0 Filed at: 10/09/2019 2254   Heart Score Risk Calculator   History  2 Filed at: 10/09/2019 2254   ECG  1 Filed at: 10/09/2019 2254   Age  1 Filed at: 10/09/2019 2254   Risk Factors  2 Filed at: 10/09/2019 2254   Troponin  0 Filed at: 10/09/2019 2254   HEART Score  6 Filed at: 10/09/2019 2254   HEART Score  6 Filed at: 10/09/2019 2254                Initial Sepsis Screening     Row Name 10/09/19 2246                Is the patient's history suggestive of a new or worsening infection? (!) Yes (Proceed)  -AO        Suspected source of infection  pneumonia  -AO        Are two or more of the following signs & symptoms of infection both present and new to the patient? No  -AO        Indicate SIRS criteria  Tachycardia > 90 bpm  -AO        If the answer is yes to both questions, suspicion of sepsis is present          If severe sepsis is present AND tissue hypoperfusion perists in the hour after fluid resuscitation or lactate > 4, the patient meets criteria for SEPTIC SHOCK          Are any of the following organ dysfunction criteria present within 6 hours of suspected infection and SIRS criteria that are NOT considered to be chronic conditions?         Organ dysfunction          Date of presentation of severe sepsis          Time of presentation of severe sepsis          Tissue hypoperfusion persists in the hour after crystalloid fluid administration, evidenced, by either:          Was hypotension present within one hour of the conclusion of crystalloid fluid administration?           Date of presentation of septic shock          Time of presentation of septic shock            User Key  (r) = Recorded By, (t) = Taken By, (c) = Cosigned By    [address] Name Provider Robert Bowman MD Physician          BRITTA Risk Score      Most Recent Value   Age >= 65  0 Filed at: 10/09/2019 2254   Known CAD (stenosis >= 50%)  0 Filed at: 10/09/2019 2254   Recent (<=24 hrs) Service Angina  0 Filed at: 10/09/2019 2254   ST Deviation >= 0 5 mm  0 Filed at: 10/09/2019 2254   3+ CAD Risk Factors (FHx, HTN, HLP, DM, Smoker)  1 Filed at: 10/09/2019 2254   Aspirin Use Past 7 Days  0 Filed at: 10/09/2019 2254   Elevated Cardiac Markers  0 Filed at: 10/09/2019 2254   BRITTA Risk Score (Calculated)  1 Filed at: 10/09/2019 2254              MDM  Number of Diagnoses or Management Options  Diagnosis management comments: DDX including but not limited to: Rapid atrial fibrillation, metabolic abnormality, ACS, MI, doubt PE since patient is on xarelto, PTX, pneumonia, dissection, pleurisy, pericarditis, myocarditis, rhabdomyolysis, doubt GI etiology, thyroid disease  Amount and/or Complexity of Data Reviewed  Clinical lab tests: ordered and reviewed  Tests in the radiology section of CPT®: ordered and reviewed  Decide to obtain previous medical records or to obtain history from someone other than the patient: yes  Review and summarize past medical records: yes  Discuss the patient with other providers: yes  Independent visualization of images, tracings, or specimens: yes        Disposition  Final diagnoses:   Rapid atrial fibrillation ([address] )   Chest pain   Acute-on-chronic kidney injury ([address] )   Pancreatitis     Time reflects when diagnosis was documented in both MDM as applicable and the Disposition within this note     Time User Action Codes Description Comment    10/9/2019 10:55 PM Marlene Davalos Add [I48 91] Rapid atrial fibrillation ([address] )     10/9/2019 10:55 PM Marlene Davalos Add [R07 9] Chest pain     10/9/2019 10:56 PM Marlene Davalos Add [N17 9,  N18 9] Acute-on-chronic kidney injury ([address] )     10/9/2019 11:09 PM Marlene Davalos Add [K85 90] Pancreatitis       ED Disposition     ED Disposition Condition Date/Time Comment    Admit Stable Wed Oct 9, 2019 11:21 PM Case was discussed with CHARLEY Robles and the patient's admission status was agreed to be Admission Status: inpatient status to the service of Dr Anastasia Sneed           Follow-up Information    None Patient's Medications   Discharge Prescriptions    No medications on file     No discharge procedures on file      ED Provider  Electronically Signed by           Maximino Perez MD  10/09/19 1335

## 2019-10-10 NOTE — DISCHARGE INSTR - AVS FIRST PAGE
Do not take Lasix for least 3 days  When you start it back just take it on an as-needed basis for leg swelling  Do not take it more than 3 times a week  You have lab work to be drawn  Have a basic metabolic panel drawn on Monday of next week

## 2019-10-10 NOTE — ASSESSMENT & PLAN NOTE
· Lipase 959  No abdominal pain    · Serial abdominal exams  · Recheck lipase tomorrow  · Consider imaging if patient develops abdominal pain or vomiting

## 2019-10-10 NOTE — UTILIZATION REVIEW
Initial Clinical Review    Admission: Date/Time/Statement: Inpatient Admission Orders (From admission, onward)     Ordered        10/09/19 2321  Inpatient Admission  Once                   Orders Placed This Encounter   Procedures    Inpatient Admission     Telemetry     Standing Status:   Standing     Number of Occurrences:   1     Order Specific Question:   Admitting Physician     Answer:   Krishna Calderon [[id number]]     Order Specific Question:   Level of Care     Answer:   Med Surg [[id number]]     Order Specific Question:   Bed request comments     Answer:   telemetry     Order Specific Question:   Estimated length of stay     Answer:   More than 2 Midnights     Order Specific Question:   Certification     Answer:   I certify that inpatient services are medically necessary for this patient for a duration of greater than two midnights  See H&P and MD Progress Notes for additional information about the patient's course of treatment  ED Arrival Information     Expected Arrival Acuity Means of Arrival Escorted By Service Admission Type    - 10/9/2019 22:05 Urgent Wheelchair Spouse Hospitalist Urgent    Arrival Complaint    Chest Pains        Chief Complaint   Patient presents with    Chest Pain     Pt arrives with c/o midstrnum CP and SOB that woke her up about 2 hours ago  Denies N/V/D  Pt reports recent cough, Hx of afib, has defib/pacemaker     Assessment/Plan:      46 yo female,   To ED from home,   Admitted INPT status to [facility] of care,  For treatment of  Afib w/elevated HR and DANIELA  Pt w/h/o Afib and has pacemaker,  Presents w/c/o  Worsening, constant, mid sternal chest pain w/SOB and cough,  for last couple of hours  Baseline crt  1 4, upon admission  crt  2 03  Will place pt on telemetry with continuous Cardizem gtt - titrate per Heart rate;   Initiated IVF,  Avoid hypotension/nephrotoxins,  HOLD home lasix      10/10  @  1018  AM:    Internal Medicine  IN NSR  And Off Cardizem drip for at least 8 hours   creatinine has come down from 2 03 yesterday to 1 72 with IV fluids    Patient without complaint and is adamant about going home  okay so long is she does not take her Lasix for 3 days as I am suspecting her DANIELA was pre renal in nature due to dehydration  Dehydration is also at likely etiology of her AFib with RVR  To have a repeat BMP drawn on Monday of next week and to follow up with her PCP within 1-2 weeks      ED Triage Vitals [10/09/19 2216]   Temperature Pulse Respirations Blood Pressure SpO2   97 9 °F (36 6 °C) 97 18 170/93 98 %      Temp Source Heart Rate Source Patient Position - Orthostatic VS BP Location FiO2 (%)   Oral Monitor Lying Right arm --      Pain Score       9        Wt Readings from Last 1 Encounters:   10/10/19 103 kg (227 lb 1 2 oz)     Additional Vital Signs:   10/10/19 0812  98 4 °F  61  18  155/89    95 %   10/10/19 0808        155/89       10/10/19 0600  98 °F   60  16  157/78    99 %   10/10/19 0208    124  16  165/91  119  99 %   10/10/19 0000  98  120   16  146/59    97 %  rm air      Pertinent Labs/Diagnostic Test Results:   10/9   CXR -  PPM and no acute disease  10/9  EKG -  Rapid Afib ;  complete RBBB, LAFB and bifascicular block      Results from last 7 days   Lab Units 10/10/19  0602 10/09/19  2229   WBC Thousand/uL 8 47 11 73*   HEMOGLOBIN g/dL 12 4 13 9   HEMATOCRIT % 39 7 43 1   PLATELETS Thousands/uL 148* 199   NEUTROS ABS Thousands/µL 6 04 8 60*         Results from last 7 days   Lab Units 10/10/19  0602 10/09/19  2229   SODIUM mmol/L 136 138   POTASSIUM mmol/L 4 5 4 6   CHLORIDE mmol/L 107 102   CO2 mmol/L 22 27   ANION GAP mmol/L 7 9   BUN mg/dL 30* 33*   CREATININE mg/dL 1 72* 2 03*   EGFR ml/min/1 73sq m 33 27   CALCIUM mg/dL 8 7 9 1   MAGNESIUM mg/dL  --  2 0     Results from last 7 days   Lab Units 10/09/19  2229   AST U/L 20   ALT U/L 26   ALK PHOS U/L 94   TOTAL PROTEIN g/dL 8 8*   ALBUMIN g/dL 4 2   TOTAL BILIRUBIN mg/dL 0 30     Results from last 7 days   Lab Units 10/10/19  0602 10/09/19  2229   GLUCOSE RANDOM mg/dL 112 131     Results from last 7 days   Lab Units 10/09/19  2229   TROPONIN I ng/mL 0 04     Results from last 7 days   Lab Units 10/09/19  2229   TSH 3RD GENERATON uIU/mL 1 435     Results from last 7 days   Lab Units 10/09/19  2229   NT-PRO BNP pg/mL 4,548*     Results from last 7 days   Lab Units 10/09/19  2229   LIPASE u/L 959*       ED Treatment:   Medication Administration from 10/09/2019 2205 to 10/10/2019 0201       Date/Time Order Dose Route     10/09/2019 2229 diltiazem (CARDIZEM) injection 10 mg 10 mg Intravenous     10/09/2019 2240 diltiazem (CARDIZEM) 125 mg in sodium chloride 0 9 % 125 mL infusion 10 mg/hr Intravenous     10/09/2019 2234 aspirin chewable tablet 162 mg 162 mg Oral     10/09/2019 2323 nitroglycerin (NITRO-BID) 2 % TD ointment 0 5 inch 0 5 inch Topical       Continuous Cardizem gtt from   10/9  @  2240,  Stopped  On   10/10  @  46       Past Medical History:   Diagnosis Date    Arthritis     [facility]     Atrial fibrillation (HCC)     Breast lump     GERD (gastroesophageal reflux disease)     H/O transfusion 1987    Hepatitis C     resolved    Hepatitis C     Hyperlipidemia     Hypertension     Irregular heart beat     Pacemaker     Sleep apnea     no cpap     Present on Admission:   At[facility] fibrillation ([facility] )   DANIELA (acute kidney injury) ([facility] )   Essential hypertension   History of ventricular tachycardia ([facility] ) with ICD      Admitting Diagnosis: Pancreatitis [K85 90]  Chest pain [R07 9]  Rapid atrial fibrillation (HCC) [I48 91]  Acute-on-chronic kidney injury ([facility] ) [N17 9, N18 9]  Age/Sex: 47 y o  female    Admission Orders:  Telemetry; CONTINUOUS cardizem gtt;   IVF NS  @  50 cc/hr;  SCD's;  I/o q shift;  VS q 4 hr         Medications:  amLODIPine 5 mg Oral Daily   famotidine 20 mg Oral Daily   furosemide 20 mg Oral Daily   metoprolol succinate 75 mg Oral BID   nicotine 14 mg Transdermal Daily   pantoprazole 40 mg Oral Daily Before Breakfast   rivaroxaban 20 mg Oral Daily With Breakfast       sodium chloride 50 mL/hr Intravenous Continuous       acetaminophen 650 mg Oral Q6H PRN   sodium chloride (PF) 3 mL Intravenous PRN       Network Utilization Review Department  Phone: [phone]; Fax [phone]  [email or web address]  ATTENTION: Please call with any questions or concerns to [phone]  and carefully listen to the prompts so that you are directed to the right person  Send all requests for admission clinical reviews, approved or denied determinations and any other requests to fax [phone]   All voicemails are confidential

## 2019-10-10 NOTE — NURSING NOTE
Patient discharged home, escorted by her spouse  Discharge instructions reviewed prior to discharge

## 2019-10-10 NOTE — ASSESSMENT & PLAN NOTE
· Has ICD    Recent interrogation September 2019: appropriately functioning  · Continue outpatient follow-up

## 2019-10-10 NOTE — ASSESSMENT & PLAN NOTE
· Creatinine 2 03 with baseline 1 4  · Normal saline at 50 an hour  · Avoid hypotension and nephrotoxins  · Daily BMP and trend creatinine

## 2019-10-10 NOTE — H&P
[facility] SPECIALTY [facility] Internal Medicine    H&P- Marva Villanueva 1965, 47 y o  female MRN: [id number]    Unit/Bed#: -Keisha Encounter: [id number]    Primary Care Provider: Delta Sun MD   Date and time admitted to hospital: 10/9/2019 10:14 PM        * Atrial fibrillation [facility])  Assessment & Plan  · Presents with 2 hour onset of atrial fibrillation, rate 160s  · Cardizem drip initiated in the ER  · Telemetry monitoring  · Anticoagulated with Xarelto  · Continue metoprolol    Elevated lipase  Assessment & Plan  · Lipase 959  No abdominal pain  · Serial abdominal exams  · Recheck lipase tomorrow  · Consider imaging if patient develops abdominal pain or vomiting    DANIELA (acute kidney injury) ([facility] )  Assessment & Plan  · Creatinine 2 03 with baseline 1 4  · Normal saline at 50 an hour  · Avoid hypotension and nephrotoxins  · Daily BMP and trend creatinine    [facility] ) with ICD  Assessment & Plan  · Has ICD  Recent interrogation September 2019: appropriately functioning  · Continue outpatient follow-up    Essential hypertension  Assessment & Plan  · Blood pressure reviewed and acceptable  · Continue amlodipine, metoprolol  · Monitor blood pressuresLasix on hold due to acute kidney injury      VTE Prophylaxis: Rivaroxaban (Xarelto)  / sequential compression device   Code Status:  Full  POLST: POLST form is not discussed and not completed at this time  Discussion with family:  Patient    Anticipated Length of Stay:  Patient will be admitted on an Inpatient basis with an anticipated length of stay of  greater than 2 midnights  Justification for Hospital Stay:  Atrial fibrillation, elevated lipase, acute kidney injury    Total Time for Visit, including Counseling / Coordination of Care: 45 minutes  Greater than 50% of this total time spent on direct patient counseling and coordination of care      Chief Complaint:   Rapid heart rate    History of Present Illness:    Marva Villanueva is a 47 y o  female with a history of atrial fibrillation, hypertension, ventricular tachycardia with ICD, and hepatitis C who presents with rapid heart rate for 2 hours with chest pain  STAFFANSTORP says that chest pain is with a mill of her chest, it is constant, severe, worsening, and non-radiating  She says she has a cough, with clear sputum  The patient does smoke  She also said she was sweating when this occurred, and felt lightheaded, so she went to rest on her bed  She also says that for some time, she is not able to specify, she has experienced fullness after eating  She says she does not drink alcohol  Review of Systems:    Review of Systems   Constitutional: Positive for appetite change and diaphoresis  Negative for chills and fever  HENT: Negative for sore throat  Eyes: Negative for photophobia  Respiratory: Negative for cough, chest tightness and shortness of breath  Cardiovascular: Positive for chest pain  Negative for palpitations and leg swelling  Gastrointestinal: Negative for abdominal distention, abdominal pain, blood in stool, constipation, diarrhea, nausea and vomiting  Genitourinary: Negative for dysuria, flank pain and frequency  Musculoskeletal: Negative for arthralgias, myalgias and neck pain  Skin: Negative for pallor and rash  Neurological: Positive for light-headedness  Negative for dizziness, seizures, syncope, weakness and headaches  All other systems reviewed and are negative        Past Medical and Surgical History:     Past Medical History:   Diagnosis Date    Arthritis     Atrial fibrillation ([address] )     Atrial fibrillation (HCC)     Breast lump     GERD (gastroesophageal reflux disease)     H/O transfusion 1987    Hepatitis C     resolved    Hepatitis C     Hyperlipidemia     Hypertension     Irregular heart beat     Pacemaker     Sleep apnea     no cpap       Past Surgical History:   Procedure Laterality Date    CARDIAC CATHETERIZATION  01/07/2019    CARDIAC DEFIBRILLATOR PLACEMENT      CARDIAC PACEMAKER PLACEMENT  2016    AFIB     CHOLECYSTECTOMY      COLONOSCOPY      ELBOW SURGERY      HYSTERECTOMY      HYSTERECTOMY      JOINT REPLACEMENT Left 2015    TKR    KNEE SURGERY Left     PA ESOPHAGOGASTRODUODENOSCOPY TRANSORAL DIAGNOSTIC N/A 5/2/2018    Procedure: ESOPHAGOGASTRODUODENOSCOPY (EGD); Surgeon: Prabhu Xie MD;  Location: [facility]; Service: Gastroenterology    PA LARYNGOSCOPY,DIRCT,OP [facility] TUMR N/A 8/10/2018    Procedure: MICRO DIRECT LARYNGOSCOPY , EXCISION OF POLYPS, KTP LASER;  Surgeon: Chauncey Watson MD;  Location[facility];  Service: ENT    REPLACEMENT TOTAL KNEE Left     THROAT SURGERY      polyps removed       Meds/Allergies:    Prior to Admission medications    Medication Sig Start Date End Date Taking? Authorizing Provider   amLODIPine (NORVASC) 10 mg tablet 1/2 tab daily 9/25/19  Yes CHARLOTTE Mazariegos   cyclobenzaprine (FLEXERIL) 5 mg tablet cyclobenzaprine 5 mg tablet   Yes Historical Provider, MD   famotidine (PEPCID) 20 mg tablet Take 20 mg by mouth daily   Yes Historical Provider, MD   furosemide (LASIX) 20 mg tablet  9/10/19  Yes Historical Provider, MD   metoprolol succinate (TOPROL-XL) 50 mg 24 hr tablet Take one and 1/2 tabs  Twice a day ( 75 mg BID) 9/25/19  Yes CHARLOTTE Mazariegos   pantoprazole (PROTONIX) 40 mg tablet take 1 tablet by mouth daily 30 MINUTES BEFORE BREAKFAST 8/12/19  Yes Chauncey Watson MD   rivaroxaban (XARELTO) 20 mg tablet Take 20 mg by mouth daily with breakfast   Yes Historical Provider, MD   traMADol (ULTRAM) 50 mg tablet Take 50 mg by mouth every 6 (six) hours as needed for moderate pain   Yes Historical Provider, MD   nicotine (NICODERM CQ) 14 mg/24hr TD 24 hr patch Place 1 patch on the skin daily  Patient not taking: Reported on 9/25/2019 9/11/19   Brody You MD     I have reviewed home medications with patient personally  Allergies:    Allergies   Allergen Reactions    Iodinated Diagnostic Agents Hives    Tape  [Medical Tape] Hives       Social History:     Marital Status: /Civil Union   Occupation:    Patient Pre-hospital Living Situation:  Home with   Patient Pre-hospital Level of Mobility:  Independent  Patient Pre-hospital Diet Restrictions:  None  Substance Use History:   Social History     Substance and Sexual Activity   Alcohol Use No     Social History     Tobacco Use   Smoking Status Current Every Day Smoker    Packs/day: 0 50    Years: 0 00    Pack years: 0 00    Types: Cigarettes   Smokeless Tobacco Never Used   Tobacco Comment    about 3 daily     Social History     Substance and Sexual Activity   Drug Use No       Family History:    Family History   Problem Relation Age of Onset    Arthritis Family     Cancer Family     Diabetes Family     Hypertension Family        Physical Exam:     Vitals:   Blood Pressure: 157/78 (10/10/19 0600)  Pulse: 60 (10/10/19 0600)  Temperature: 98 °F (36 7 °C) (10/10/19 0600)  Temp Source: Oral (10/10/19 0600)  Respirations: 16 (10/10/19 0600)  Height: 5' 7" (170 2 cm) (10/10/19 0208)  Weight - Scale: 103 kg (227 lb 1 2 oz) (10/10/19 0600)  SpO2: 99 % (10/10/19 0600)    Physical Exam   Constitutional: She is oriented to person, place, and time  She appears well-developed and well-nourished  HENT:   Head: Normocephalic and atraumatic  Mouth/Throat: Oropharynx is clear and moist    Eyes: Pupils are equal, round, and reactive to light  EOM are normal    Neck: Normal range of motion  Neck supple  Cardiovascular: Normal heart sounds and intact distal pulses  Exam reveals no gallop and no friction rub  No murmur heard  Irregular, tachycardic   Pulmonary/Chest: Effort normal and breath sounds normal  No respiratory distress  Abdominal: Soft  Bowel sounds are normal  She exhibits no distension and no mass  There is no tenderness  There is no guarding  Musculoskeletal: Normal range of motion   She exhibits no edema, tenderness or deformity  Neurological: She is alert and oriented to person, place, and time  Skin: Skin is warm and dry  Capillary refill takes less than 2 seconds  Nursing note and vitals reviewed  Additional Data:     Lab Results: I have personally reviewed pertinent reports  Results from last 7 days   Lab Units 10/10/19  0602   WBC Thousand/uL 8 47   HEMOGLOBIN g/dL 12 4   HEMATOCRIT % 39 7   PLATELETS Thousands/uL 148*   NEUTROS PCT % 71   LYMPHS PCT % 21   MONOS PCT % 6   EOS PCT % 1     Results from last 7 days   Lab Units 10/10/19  0602 10/09/19  2229   SODIUM mmol/L 136 138   POTASSIUM mmol/L 4 5 4 6   CHLORIDE mmol/L 107 102   CO2 mmol/L 22 27   BUN mg/dL 30* 33*   CREATININE mg/dL 1 72* 2 03*   ANION GAP mmol/L 7 9   CALCIUM mg/dL 8 7 9 1   ALBUMIN g/dL  --  4 2   TOTAL BILIRUBIN mg/dL  --  0 30   ALK PHOS U/L  --  94   ALT U/L  --  26   AST U/L  --  20   GLUCOSE RANDOM mg/dL 112 131                       Imaging: I have personally reviewed pertinent reports  X-ray chest 1 view portable   ED Interpretation by Ana Aiken MD (10/09 0123)   PPM and no acute disease read by me  EKG, Pathology, and Other Studies Reviewed on Admission:   · EKG:     Allscripts / Epic Records Reviewed: Yes     ** Please Note: This note has been constructed using a voice recognition system   **

## 2019-10-10 NOTE — ASSESSMENT & PLAN NOTE
· Blood pressure reviewed and acceptable  · Continue amlodipine, metoprolol  · Monitor blood pressuresLasix on hold due to acute kidney injury

## 2019-10-11 NOTE — ASSESSMENT & PLAN NOTE
· DANIELA superimposed on CKD III  · Creatinine was 2 03 on admission  Baseline around 1 4  Most likely prerenal in nature due to dehydration  Creatinine came down to 1 72 with IV fluids  Patient very anxious to go home  Will discharge home and will have her hold Lasix for next three days  Afterward she can take it as-needed

## 2019-10-11 NOTE — DISCHARGE SUMMARY
Discharge- [facility] 1965, 47 y o  female MRN: [id number]    Unit/Bed#: -01 Encounter: [id number]    Primary Care Provider: Mike Baldwin MD   Date and time admitted to hospital: 10/9/2019 10:14 PM        Elevated lipase  Assessment & Plan  · Lipase 959  Uncertain significance  No abdominal pain  DANIELA (acute kidney injury) ([address] )  Assessment & Plan  · DANIELA superimposed on CKD III  · Creatinine was 2 03 on admission  Baseline around 1 4  Most likely prerenal in nature due to dehydration  Creatinine came down to 1 72 with IV fluids  Patient very anxious to go home  Will discharge home and will have her hold Lasix for next three days  Afterward she can take it as-needed  [facility] with ICD  Assessment & Plan  · Has ICD  Recent interrogation September 2019: appropriately functioning  · Continue outpatient follow-up    Essential hypertension  Assessment & Plan  · Blood pressure reviewed and acceptable  · Continue amlodipine, metoprolol    * Atrial fibrillation (HCC)  Assessment & Plan  · Presents with 2 hour onset of atrial fibrillation, rate 160s  · Possibly due to dehydration  Converted to NSR with IV fluids and with Cardizem drip which was subsequently titrated off  Continue Toprol XL  Discharge Summary - [facility] Internal Medicine    Patient Information: [facility] 47 y o  female MRN: [id number]  Unit/Bed#: -01 Encounter: [id number]    Discharging Physician / Practitioner: Amanda Duarte DO  PCP: Mike Baldwin MD  Admission Date: 10/9/2019  Discharge Date: 10/10/19    Disposition:     Home     Reason for Admission: a-fib w/ RVR, DANIELA    Discharge Diagnoses:     Principal Problem:    Atrial fibrillation ([address] )  Active Problems:    Essential hypertension    History of ventricular tachycardia ([address] ) with ICD    DANIELA (acute kidney injury) ([facility] )    Elevated lipase  Resolved Problems:    * No resolved hospital problems   *      Consultations During Hospital Stay:  · None    Procedures Performed:     · None    Significant Findings / Test Results:     X-ray chest 1 view portable [[id number]] Collected: 10/10/19 0831   Order Status: Completed Updated: 10/10/19 0832   Narrative:     CHEST     INDICATION:   chest pain  COMPARISON:  9/9/2019    EXAM PERFORMED/VIEWS:  OO CHEST PORTABLE      FINDINGS:  Left-sided chest wall pacemaker is identified   Pacemaker leads are intact  Cardiomediastinal silhouette appears unremarkable  The lungs are clear   No pneumothorax or pleural effusion  Osseous structures appear within normal limits for patient age  Impression:       No acute cardiopulmonary disease  ·     Incidental Findings:   · None     Test Results Pending at Discharge (will require follow up): · None     Outpatient Tests Requested:  · BMP    Complications:  None    Hospital Course:     Patient was admitted for a-fib w/ RVR and DANIELA superimposed on CKD  Her baseline creatinine is 1 4 but it was 2 03 when she came in  Her HR was in the 160s  She was started on IV fluids and Cardizem drip  Her home Toprol XL was resumed  She converted to NSR and Cardizem drip was stopped  Her creatinine came down to 1 72  She was very anxious to go home  I instructed her to not take Lasix for 3 days, to take it on an as-needed basis afterward, to have a BMP drawn on Monday next week, and to follow-up with her PCP in 1-2 weeks  She expressed understanding  Condition at Discharge: stable     Discharge Day Visit / Exam:     * Please refer to separate progress note for these details *    Discussion with Family: Discussed with significant other at bedside      Discharge instructions/Information to patient and family:   See after visit summary for information provided to patient and family  Provisions for Follow-Up Care:  See after visit summary for information related to follow-up care and any pertinent home health orders        Planned Readmission: None    Discharge Statement:  I spent 30 minutes discharging the patient  This time was spent on the day of discharge  I had direct contact with the patient on the day of discharge  Greater than 50% of the total time was spent examining patient, answering all patient questions, arranging and discussing plan of care with patient as well as directly providing post-discharge instructions  Additional time then spent on discharge activities  Discharge Medications:  See after visit summary for reconciled discharge medications provided to patient and family  ** Please Note: This note has been constructed using a voice recognition system   **

## 2019-10-11 NOTE — ASSESSMENT & PLAN NOTE
· Presents with 2 hour onset of atrial fibrillation, rate 160s  · Possibly due to dehydration  Converted to NSR with IV fluids and with Cardizem drip which was subsequently titrated off  Continue Toprol XL

## 2019-10-14 ENCOUNTER — REMOTE DEVICE CLINIC VISIT (OUTPATIENT)
Dept: CARDIOLOGY CLINIC | Facility: CLINIC | Age: 54
End: 2019-10-14
Payer: COMMERCIAL

## 2019-10-14 DIAGNOSIS — Z95.810 PRESENCE OF IMPLANTABLE CARDIOVERTER-DEFIBRILLATOR (ICD): Primary | ICD-10-CM

## 2019-10-14 PROCEDURE — 93296 REM INTERROG EVL PM/IDS: CPT | Performed by: INTERNAL MEDICINE

## 2019-10-14 PROCEDURE — 93295 DEV INTERROG REMOTE 1/2/MLT: CPT | Performed by: INTERNAL MEDICINE

## 2019-10-14 NOTE — PROGRESS NOTES
MDT DUAL ICD  CARELINK TRANSMISSION:  BATTERY VOLTAGE ADEQUATE (7 2 YR)   AP 56 5%  0 2%    ALL LEAD PARAMETERS WITHIN NORMAL LIMITS   NO NEW HIGH RATE EPISODES   OPTI-VOL WITHIN NORMAL LIMITS   NORMAL DEVICE FUNCTION   RG

## 2019-10-15 LAB
BACTERIA BLD CULT: NORMAL
BACTERIA BLD CULT: NORMAL

## 2019-12-15 ENCOUNTER — HOSPITAL ENCOUNTER (EMERGENCY)
Facility: HOSPITAL | Age: 54
Discharge: HOME | End: 2019-12-15
Attending: EMERGENCY MEDICINE | Admitting: EMERGENCY MEDICINE
Payer: COMMERCIAL

## 2019-12-15 VITALS
WEIGHT: 223.77 LBS | OXYGEN SATURATION: 99 % | DIASTOLIC BLOOD PRESSURE: 89 MMHG | HEART RATE: 70 BPM | RESPIRATION RATE: 18 BRPM | SYSTOLIC BLOOD PRESSURE: 191 MMHG | BODY MASS INDEX: 35.05 KG/M2 | TEMPERATURE: 98.5 F

## 2019-12-15 DIAGNOSIS — L03.213 PRESEPTAL CELLULITIS OF LEFT EYE: Primary | ICD-10-CM

## 2019-12-15 PROCEDURE — 99284 EMERGENCY DEPT VISIT MOD MDM: CPT | Performed by: PHYSICIAN ASSISTANT

## 2019-12-15 PROCEDURE — 99283 EMERGENCY DEPT VISIT LOW MDM: CPT

## 2019-12-15 RX ORDER — TETRACAINE HYDROCHLORIDE 5 MG/ML
1 SOLUTION OPHTHALMIC ONCE
Status: COMPLETED | OUTPATIENT
Start: 2019-12-15 | End: 2019-12-15

## 2019-12-15 RX ORDER — CLINDAMYCIN HYDROCHLORIDE 150 MG/1
450 CAPSULE ORAL EVERY 8 HOURS SCHEDULED
Qty: 90 CAPSULE | Refills: 0 | Status: SHIPPED | OUTPATIENT
Start: 2019-12-15 | End: 2019-12-25

## 2019-12-15 RX ADMIN — TETRACAINE HYDROCHLORIDE 1 DROP: 5 SOLUTION OPHTHALMIC at 17:26

## 2019-12-15 RX ADMIN — FLUORESCEIN SODIUM 1 STRIP: 1 STRIP OPHTHALMIC at 17:26

## 2019-12-15 NOTE — ED ATTENDING ATTESTATION
12/15/2019  IRubi DO, saw and evaluated the patient  I have discussed the patient with the resident/non-physician practitioner and agree with the resident's/non-physician practitioner's findings, Plan of Care, and MDM as documented in the resident's/non-physician practitioner's note, except where noted  All available labs and Radiology studies were reviewed  I was present for key portions of any procedure(s) performed by the resident/non-physician practitioner and I was immediately available to provide assistance  At this point I agree with the current assessment done in the Emergency Department  I have conducted an independent evaluation of this patient a history and physical is as follows:    Patient 61-year-old female who presents with left eye pain and redness  Patient has had symptoms for about 5 days and went to [facility] where she was diagnosed with conjunctivitis  She was placed on eyedrops, but she cannot recall the exact name  He has been taking the medication every 4 hours without relief  She now has increased pain, redness around the left eye  She admits to slight blurred vision  She denies fevers, chills, headaches, other complaints  On exam, patient has left conjunctival injection  There is erythema in the periorbital region  EOMI  No pain with extraocular movements  No proptosis  No tenderness over the left temporal region  Visual acuity noted and does not reveal any significant vision loss  Will instill tetracaine and rule out foreign body or corneal abrasion  Physician assistant performed flourescein staining  There is no corneal abrasion  Negative Peterson sign  Do not suspect orbital cellulitis  Will treat for periorbital cellulitis  This likely occurred secondary to extension from a conjunctivitis  Also feel that acute angle closure glaucoma, scleritis or anterior uveitis unlikely  Recommended outpatient follow up with PCP    Patient agrees and will return to the ED sooner if symptoms worsen      ED Course         Critical Care Time  Procedures

## 2019-12-15 NOTE — ED PROVIDER NOTES
History  Chief Complaint   Patient presents with    Eye Pain     left eye pain  with reddness and swelling since thursday, seen at [facility] diagnosed with conjunctivitis, given drops without improvement  pain now traveling to left parietal area and ear, +blurred vision     Pt is a 48 yo female with a PMH of CAD, CHF, afib, and HTN who presents to the [facility] ED with complaints of left eye pain x 5 days  The pt states her pain began on Wednesday  She went to [facility] ED on Thursday and was dx with pink eye and given eye drops that "start with the letter D"  The pt states she has been using the eye drops every four hours with no relief of her sx  She states the pain has worsened over the last couple of days  She states it feels like a scratching sensation in her L eye  Pt states her left eye is constantly draining and watering, she describes the drainage as thick and clear  She now is noticing some blurry vision in the left eye  Pt is also noting swelling around her left eye and increased redness  She reports some left ear pain and left sided headaches  Pt denies any fevers, chills, dizziness, chest pain, SOB, and abdominal pain  She has tried tylenol and ibuprofen for her sx without any relief of the pain  Pt does not wear contacts and only uses glasses when reading        History provided by:  Patient   used: No    Eye Pain   Location:  Left eye   Severity:  Moderate  Onset quality:  Sudden  Duration:  5 days  Timing:  Constant  Progression:  Worsening  Chronicity:  New  Relieved by:  Nothing   Ineffective treatments:  Tylenol, ibuprofen, eye drops given to her from [facility] ED  Associated symptoms: ear pain and headaches    Associated symptoms: no abdominal pain, no chest pain, no congestion, no cough, no diarrhea, no fever, no myalgias, no nausea, no rash, no rhinorrhea, no shortness of breath, no vomiting and no wheezing        Prior to Admission Medications   Prescriptions Last Dose Informant Patient Reported? Taking? amLODIPine (NORVASC) 10 mg tablet   No No   Si/2 tab daily   cyclobenzaprine (FLEXERIL) 5 mg tablet   Yes No   Sig: cyclobenzaprine 5 mg tablet   famotidine (PEPCID) 20 mg tablet  Self Yes No   Sig: Take 20 mg by mouth daily   furosemide (LASIX) 20 mg tablet   No No   Sig: Take 1 tablet (20 mg total) by mouth daily as needed (leg swelling) Do not take more than 3 times a week for now     furosemide (LASIX) 20 mg tablet   No No   Sig: take 1 tablet by mouth daily   metoprolol succinate (TOPROL-XL) 50 mg 24 hr tablet   No No   Sig: Take one and 1/2 tabs  Twice a day ( 75 mg BID)   nicotine (NICODERM CQ) 14 mg/24hr TD 24 hr patch  Self No No   Sig: Place 1 patch on the skin daily   Patient not taking: Reported on 2019   pantoprazole (PROTONIX) 40 mg tablet  Self No No   Sig: take 1 tablet by mouth daily 30 MINUTES BEFORE BREAKFAST   rivaroxaban (XARELTO) 20 mg tablet  Self Yes No   Sig: Take 20 mg by mouth daily with breakfast   traMADol (ULTRAM) 50 mg tablet  Self Yes No   Sig: Take 50 mg by mouth every 6 (six) hours as needed for moderate pain      Facility-Administered Medications: None       Past Medical History:   Diagnosis Date    Arthritis     Atrial fibrillation (HCC)     Atrial fibrillation (HCC)     Breast lump     GERD (gastroesophageal reflux disease)     H/O transfusion     Hepatitis C     resolved    Hepatitis C     Hyperlipidemia     Hypertension     Irregular heart beat     Pacemaker     Sleep apnea     no cpap       Past Surgical History:   Procedure Laterality Date    CARDIAC CATHETERIZATION  2019    CARDIAC DEFIBRILLATOR PLACEMENT      CARDIAC PACEMAKER PLACEMENT      AFIB     CHOLECYSTECTOMY      COLONOSCOPY      ELBOW SURGERY      HYSTERECTOMY      HYSTERECTOMY      JOINT REPLACEMENT Left     TKR    KNEE SURGERY Left     NC ESOPHAGOGASTRODUODENOSCOPY TRANSORAL DIAGNOSTIC N/A 2018    Procedure: ESOPHAGOGASTRODUODENOSCOPY (EGD); Surgeon: Lola Méndez MD;  Location: [facility]; Service: Gastroenterology    ND LARYNGOSCOPY,DIRCT,OP [facility] N/A 8/10/2018    Procedure: MICRO DIRECT LARYNGOSCOPY , EXCISION OF POLYPS, KTP LASER;  Surgeon: Ana Javed MD;  Location[facility];  Service: ENT    REPLACEMENT TOTAL KNEE Left     THROAT SURGERY      polyps removed       Family History   Problem Relation Age of Onset    Arthritis Family     Cancer Family     Diabetes Family     Hypertension Family      I have reviewed and agree with the history as documented  Social History     Tobacco Use    Smoking status: Current Every Day Smoker     Packs/day: 0 50     Years: 0 00     Pack years: 0 00     Types: Cigarettes    Smokeless tobacco: Never Used    Tobacco comment: about 3 daily   Substance Use Topics    Alcohol use: No    Drug use: No        Review of Systems   Constitutional: Negative for appetite change, chills and fever  HENT: Positive for ear pain and facial swelling  Negative for congestion, drooling, ear discharge, hearing loss, postnasal drip, rhinorrhea, sinus pressure, sinus pain, tinnitus and trouble swallowing  Left eye swelling    Eyes: Positive for pain, discharge, redness, itching and visual disturbance  Negative for photophobia  L eye pain, redness, drainage, and itching   Blurry vision in left eye    Respiratory: Negative for apnea, cough, choking, chest tightness, shortness of breath, wheezing and stridor  Cardiovascular: Negative for chest pain, palpitations and leg swelling  Gastrointestinal: Negative for abdominal distention, abdominal pain, blood in stool, constipation, diarrhea, nausea and vomiting  Endocrine: Negative for polydipsia, polyphagia and polyuria  Genitourinary: Negative for dysuria, flank pain, frequency, hematuria and urgency  Musculoskeletal: Negative for arthralgias, back pain, gait problem, joint swelling and myalgias  Skin: Negative for color change, pallor, rash and wound  Left eye swelling and redness   Neurological: Positive for headaches  Negative for dizziness, syncope and light-headedness  Psychiatric/Behavioral: Negative for agitation, behavioral problems, confusion and decreased concentration  The patient is not nervous/anxious  Physical Exam  Physical Exam   Constitutional: She is oriented to person, place, and time  She appears well-developed and well-nourished  No distress  HENT:   Head: Normocephalic and atraumatic  Right Ear: External ear normal    Left Ear: External ear normal    Nose: Nose normal    Mouth/Throat: Oropharynx is clear and moist  No oropharyngeal exudate  No erythema or swelling noted to the external or internal L ear   No discharge from the L ear noted   Mild tenderness elicited on palpation of the L tragus     Fluorescein stain did not reveal a corneal abrasion     Eyes: Pupils are equal, round, and reactive to light  EOM are normal  Right eye exhibits no discharge  Left eye exhibits discharge  No scleral icterus  L eye injected   L eyelids edematous and erythematous   Clear drainage noted from the L eye    Neck: Normal range of motion  Neck supple  No JVD present  No tracheal deviation present  Cardiovascular: Normal rate, regular rhythm and normal heart sounds  No murmur heard  Pulmonary/Chest: Effort normal and breath sounds normal  No stridor  No respiratory distress  She has no wheezes  She has no rales  She exhibits no tenderness  Abdominal: Soft  She exhibits no distension  There is no tenderness  Musculoskeletal: Normal range of motion  She exhibits no edema, tenderness or deformity  Lymphadenopathy:     She has no cervical adenopathy  Neurological: She is alert and oriented to person, place, and time  Skin: Skin is warm and dry  Capillary refill takes less than 2 seconds  No rash noted  There is erythema  No pallor     See Eyes   Psychiatric: She has a normal mood and affect  Her behavior is normal  Judgment and thought content normal        Vital Signs  ED Triage Vitals [12/15/19 1545]   Temperature Pulse Respirations Blood Pressure SpO2   98 5 °F (36 9 °C) 70 18 (!) 191/89 99 %      Temp Source Heart Rate Source Patient Position - Orthostatic VS BP Location FiO2 (%)   Oral -- -- Right arm --      Pain Score       8           Vitals:    12/15/19 1545   BP: (!) 191/89   Pulse: 70         Visual Acuity      ED Medications  Medications   fluorescein sodium sterile ophthalmic strip 1 strip (1 strip Left Eye Given by Other 12/15/19 1726)   tetracaine 0 5 % ophthalmic solution 1 drop (1 drop Left Eye Given by Other 12/15/19 1726)       Diagnostic Studies  Results Reviewed     None                 No orders to display              Procedures  Procedures         ED Course                               MDM  Number of Diagnoses or Management Options  Preseptal cellulitis of left eye: new and does not require workup  Risk of Complications, Morbidity, and/or Mortality  Presenting problems: moderate  Diagnostic procedures: moderate  Management options: moderate  General comments:     Pt is a 48 yo female who presented to the ED with complaints of L eye pain and drainage x 5 days  On exam the pt's eye was noted to be injected with clear drainage  She had periorbital edema and erythema consisted with a preseptal cellulitis  The pt was complaining of a scratching sensation to her left eye  He left eye was numbed with 2 drops of tetracaine and stained with fluorescein  Her eye was then examined under the woods lamp and no corneal or scleral abrasion was noted  The pt was then cleared for discharge  She was informed that she would need abx to treat the preseptal cellulitis  A prescription for clindamycin 450mg TID x 10 days was sent to her pharmacy  The pt was advised to complete the entire dose of her abx   Pt was encouraged to follow up with her primary care provider in regard to her ED visit  The pt was advised to return to the ED should her eye pain worsen, her vision significantly change, her headache worsen, or she notice fevers  Patient Progress  Patient progress: stable        Disposition  Final diagnoses:   Preseptal cellulitis of left eye     Time reflects when diagnosis was documented in both MDM as applicable and the Disposition within this note     Time User Action Codes Description Comment    12/15/2019  5:57 PM Dipesh Sandoval Add [J54 [id number]] Preseptal cellulitis of left eye       ED Disposition     ED Disposition Condition Date/Time Comment    Discharge Stable Sun Dec 15, 2019  5:55 PM Arianvini John discharge to home/self care              Follow-up Information     Follow up With Specialties Details Why 12 Flynn Moore MD Internal Medicine Schedule an appointment as soon as possible for a visit in 1 week  [address] [place] [address]  [phone]            Discharge Medication List as of 12/15/2019  6:11 PM      START taking these medications    Details   clindamycin (CLEOCIN) 150 mg capsule Take 3 capsules (450 mg total) by mouth every 8 (eight) hours for 10 days, Starting Sun 12/15/2019, Until Wed 12/25/2019, Normal         CONTINUE these medications which have NOT CHANGED    Details   amLODIPine (NORVASC) 10 mg tablet 1/2 tab daily, No Print      cyclobenzaprine (FLEXERIL) 5 mg tablet cyclobenzaprine 5 mg tablet, Historical Med      famotidine (PEPCID) 20 mg tablet Take 20 mg by mouth daily, Historical Med      !! furosemide (LASIX) 20 mg tablet Take 1 tablet (20 mg total) by mouth daily as needed (leg swelling) Do not take more than 3 times a week for now , Starting Thu 10/10/2019, Print      !! furosemide (LASIX) 20 mg tablet take 1 tablet by mouth daily, Normal      metoprolol succinate (TOPROL-XL) 50 mg 24 hr tablet Take one and 1/2 tabs  Twice a day ( 75 mg BID), Normal      nicotine (NICODERM CQ) 14 mg/24hr TD 24 hr patch Place 1 patch on the skin daily, Starting Wed 9/11/2019, Normal      pantoprazole (PROTONIX) 40 mg tablet take 1 tablet by mouth daily 30 MINUTES BEFORE BREAKFAST, Normal      rivaroxaban (XARELTO) 20 mg tablet Take 20 mg by mouth daily with breakfast, Historical Med      traMADol (ULTRAM) 50 mg tablet Take 50 mg by mouth every 6 (six) hours as needed for moderate pain, Historical Med       !! - Potential duplicate medications found  Please discuss with provider  No discharge procedures on file      ED Provider  Electronically Signed by           Penny Brooke PA-C  12/15/19 0618

## 2019-12-15 NOTE — DISCHARGE INSTRUCTIONS
You have been diagnosed with preseptal/periorbital cellulitis  You have been prescribed Clindamycin 450mg three times a day for ten days  Please finish your antibiotic in its entirety  Please follow up with your primary care provider in regard to your ED visit today  Please return to the ED should your experience increased vision changes/loss, increased eye pain, fevers, and worsening headaches

## 2019-12-23 ENCOUNTER — HOSPITAL ENCOUNTER (EMERGENCY)
Facility: HOSPITAL | Age: 54
Discharge: HOME | End: 2019-12-23
Attending: EMERGENCY MEDICINE | Admitting: EMERGENCY MEDICINE
Payer: COMMERCIAL

## 2019-12-23 VITALS
SYSTOLIC BLOOD PRESSURE: 168 MMHG | DIASTOLIC BLOOD PRESSURE: 80 MMHG | WEIGHT: 214.73 LBS | BODY MASS INDEX: 33.63 KG/M2 | TEMPERATURE: 97.9 F | RESPIRATION RATE: 18 BRPM | OXYGEN SATURATION: 99 % | HEART RATE: 80 BPM

## 2019-12-23 DIAGNOSIS — H10.9 CONJUNCTIVITIS: ICD-10-CM

## 2019-12-23 DIAGNOSIS — S05.00XA CORNEAL ABRASION: Primary | ICD-10-CM

## 2019-12-23 LAB
ALBUMIN SERPL BCP-MCNC: 3.8 G/DL (ref 3.5–5)
ALP SERPL-CCNC: 81 U/L (ref 46–116)
ALT SERPL W P-5'-P-CCNC: 34 U/L (ref 12–78)
ANION GAP SERPL CALCULATED.3IONS-SCNC: 9 MMOL/L (ref 4–13)
AST SERPL W P-5'-P-CCNC: 37 U/L (ref 5–45)
BASOPHILS # BLD AUTO: 0.02 THOUSANDS/ΜL (ref 0–0.1)
BASOPHILS NFR BLD AUTO: 0 % (ref 0–1)
BILIRUB SERPL-MCNC: 0.37 MG/DL (ref 0.2–1)
BUN SERPL-MCNC: 18 MG/DL (ref 5–25)
CALCIUM SERPL-MCNC: 9.2 MG/DL (ref 8.3–10.1)
CHLORIDE SERPL-SCNC: 104 MMOL/L (ref 100–108)
CO2 SERPL-SCNC: 26 MMOL/L (ref 21–32)
CREAT SERPL-MCNC: 1.85 MG/DL (ref 0.6–1.3)
EOSINOPHIL # BLD AUTO: 0.18 THOUSAND/ΜL (ref 0–0.61)
EOSINOPHIL NFR BLD AUTO: 3 % (ref 0–6)
ERYTHROCYTE [DISTWIDTH] IN BLOOD BY AUTOMATED COUNT: 13.2 % (ref 11.6–15.1)
GFR SERPL CREATININE-BSD FRML MDRD: 30 ML/MIN/1.73SQ M
GLUCOSE SERPL-MCNC: 95 MG/DL (ref 65–140)
HCT VFR BLD AUTO: 41 % (ref 34.8–46.1)
HGB BLD-MCNC: 12.6 G/DL (ref 11.5–15.4)
IMM GRANULOCYTES # BLD AUTO: 0.02 THOUSAND/UL (ref 0–0.2)
IMM GRANULOCYTES NFR BLD AUTO: 0 % (ref 0–2)
LYMPHOCYTES # BLD AUTO: 1.27 THOUSANDS/ΜL (ref 0.6–4.47)
LYMPHOCYTES NFR BLD AUTO: 23 % (ref 14–44)
MCH RBC QN AUTO: 26.5 PG (ref 26.8–34.3)
MCHC RBC AUTO-ENTMCNC: 30.7 G/DL (ref 31.4–37.4)
MCV RBC AUTO: 86 FL (ref 82–98)
MONOCYTES # BLD AUTO: 0.39 THOUSAND/ΜL (ref 0.17–1.22)
MONOCYTES NFR BLD AUTO: 7 % (ref 4–12)
NEUTROPHILS # BLD AUTO: 3.74 THOUSANDS/ΜL (ref 1.85–7.62)
NEUTS SEG NFR BLD AUTO: 67 % (ref 43–75)
NRBC BLD AUTO-RTO: 0 /100 WBCS
PLATELET # BLD AUTO: 159 THOUSANDS/UL (ref 149–390)
PMV BLD AUTO: 10.9 FL (ref 8.9–12.7)
POTASSIUM SERPL-SCNC: 5 MMOL/L (ref 3.5–5.3)
PROT SERPL-MCNC: 8.1 G/DL (ref 6.4–8.2)
RBC # BLD AUTO: 4.76 MILLION/UL (ref 3.81–5.12)
SODIUM SERPL-SCNC: 139 MMOL/L (ref 136–145)
WBC # BLD AUTO: 5.62 THOUSAND/UL (ref 4.31–10.16)

## 2019-12-23 PROCEDURE — 85025 COMPLETE CBC W/AUTO DIFF WBC: CPT | Performed by: PHYSICIAN ASSISTANT

## 2019-12-23 PROCEDURE — 36415 COLL VENOUS BLD VENIPUNCTURE: CPT | Performed by: PHYSICIAN ASSISTANT

## 2019-12-23 PROCEDURE — 99284 EMERGENCY DEPT VISIT MOD MDM: CPT | Performed by: EMERGENCY MEDICINE

## 2019-12-23 PROCEDURE — 99283 EMERGENCY DEPT VISIT LOW MDM: CPT

## 2019-12-23 PROCEDURE — 80053 COMPREHEN METABOLIC PANEL: CPT | Performed by: PHYSICIAN ASSISTANT

## 2019-12-23 RX ORDER — ERYTHROMYCIN 5 MG/G
0.5 OINTMENT OPHTHALMIC ONCE
Status: COMPLETED | OUTPATIENT
Start: 2019-12-23 | End: 2019-12-23

## 2019-12-23 RX ORDER — TETRACAINE HYDROCHLORIDE 5 MG/ML
1 SOLUTION OPHTHALMIC ONCE
Status: COMPLETED | OUTPATIENT
Start: 2019-12-23 | End: 2019-12-23

## 2019-12-23 RX ADMIN — TETRACAINE HYDROCHLORIDE 1 DROP: 5 SOLUTION OPHTHALMIC at 12:42

## 2019-12-23 RX ADMIN — FLUORESCEIN SODIUM 1 STRIP: 1 STRIP OPHTHALMIC at 12:42

## 2019-12-23 RX ADMIN — ERYTHROMYCIN 0.5 INCH: 5 OINTMENT OPHTHALMIC at 14:39

## 2019-12-23 NOTE — ED PROVIDER NOTES
History  Chief Complaint   Patient presents with    Eye Problem     Pt was evaluated here 1 week ago for redness and swelling in L eye  Pt states she was started on clindamycin  States issue resolved and then came back this am despite still taking abx  Pt is a 48 yo female with a PMH of afib, hep c, and HTN who presents to the ED with complaints of floyd eye redness, drainage, and a headache this am  The pt states she woke up this morning and noticed the redness, swelling and increased drainage from her eyes, worse on the left than the right  She states this swelling makes it harder to open her left eye, and therefore she cannot see out of her left eye  When both of her eyes are open, the pt states her vision is blurry on the left  The pt states she woke up with a headache behind her left eye  She describes the HA as a pressure sensation and admits to associated photophobia  The pt states that over the last few days she began to get cold sx, including a runny nose, congestion, and minimal cough  The was seen in the [facility] ED on 12/15 for the same eye complaints  She was dx with preseptal cellulitis and given clindamycin for her sx  She states that she has been compliant with her antibiotics and that her symptoms had resolved entirely up until this morning  Pt denies any fevers, chills, dizziness, syncope, abdominal, pain, chest pain, and SOB  History provided by:  Patient   used: No    Eye Problem   Location:  Both eyes  Quality:  Foreign body sensation and stinging  Severity:  Moderate  Onset quality:  Sudden  Duration:  1 day  Timing:  Constant  Progression:  Worsening  Chronicity:  Recurrent  Context: not burn, not chemical exposure, not contact lens problem, not direct trauma, not foreign body and not scratch    Relieved by:  Nothing  Worsened by:  Bright light  Ineffective treatments: tylenol and warm compress    Associated symptoms: blurred vision, decreased vision, discharge, headaches, photophobia, redness, swelling and tearing    Associated symptoms: no crusting, no double vision, no facial rash, no inflammation, no itching, no nausea, no numbness, no scotomas, no tingling, no vomiting and no weakness        Prior to Admission Medications   Prescriptions Last Dose Informant Patient Reported? Taking? amLODIPine (NORVASC) 10 mg tablet   No No   Si/2 tab daily   clindamycin (CLEOCIN) 150 mg capsule   No No   Sig: Take 3 capsules (450 mg total) by mouth every 8 (eight) hours for 10 days   cyclobenzaprine (FLEXERIL) 5 mg tablet   Yes No   Sig: cyclobenzaprine 5 mg tablet   famotidine (PEPCID) 20 mg tablet  Self Yes No   Sig: Take 20 mg by mouth daily   furosemide (LASIX) 20 mg tablet   No No   Sig: Take 1 tablet (20 mg total) by mouth daily as needed (leg swelling) Do not take more than 3 times a week for now     furosemide (LASIX) 20 mg tablet   No No   Sig: take 1 tablet by mouth daily   metoprolol succinate (TOPROL-XL) 50 mg 24 hr tablet   No No   Sig: Take one and 1/2 tabs  Twice a day ( 75 mg BID)   nicotine (NICODERM CQ) 14 mg/24hr TD 24 hr patch  Self No No   Sig: Place 1 patch on the skin daily   Patient not taking: Reported on 2019   pantoprazole (PROTONIX) 40 mg tablet  Self No No   Sig: take 1 tablet by mouth daily 30 MINUTES BEFORE BREAKFAST   rivaroxaban (XARELTO) 20 mg tablet  Self Yes No   Sig: Take 20 mg by mouth daily with breakfast   traMADol (ULTRAM) 50 mg tablet  Self Yes No   Sig: Take 50 mg by mouth every 6 (six) hours as needed for moderate pain      Facility-Administered Medications: None       Past Medical History:   Diagnosis Date    Arthritis     Atrial fibrillation (HCC)     Atrial fibrillation (HCC)     Breast lump     GERD (gastroesophageal reflux disease)     H/O transfusion     Hepatitis C     resolved    Hepatitis C     Hyperlipidemia     Hypertension     Irregular heart beat     Pacemaker     Sleep apnea     no cpap Past Surgical History:   Procedure Laterality Date    CARDIAC CATHETERIZATION  01/07/2019    CARDIAC DEFIBRILLATOR PLACEMENT      CARDIAC PACEMAKER PLACEMENT  2016    AFIB     CHOLECYSTECTOMY      COLONOSCOPY      ELBOW SURGERY      HYSTERECTOMY      HYSTERECTOMY      JOINT REPLACEMENT Left 2015    TKR    KNEE SURGERY Left     SC ESOPHAGOGASTRODUODENOSCOPY TRANSORAL DIAGNOSTIC N/A 5/2/2018    Procedure: ESOPHAGOGASTRODUODENOSCOPY (EGD); Surgeon: Maryjo Mcclendon MD;  Location: [facility]; Service: Gastroenterology    SC LARYNGOSCOPY,DIRCT,[facility] N/A 8/10/2018    Procedure: MICRO DIRECT LARYNGOSCOPY , EXCISION OF POLYPS, KTP LASER;  Surgeon: Mekhi Flores MD;  Location[facility];  Service: ENT    REPLACEMENT TOTAL KNEE Left     THROAT SURGERY      polyps removed       Family History   Problem Relation Age of Onset    Arthritis Family     Cancer Family     Diabetes Family     Hypertension Family      I have reviewed and agree with the history as documented  Social History     Tobacco Use    Smoking status: Current Every Day Smoker     Packs/day: 0 50     Years: 0 00     Pack years: 0 00     Types: Cigarettes    Smokeless tobacco: Never Used    Tobacco comment: about 3 daily   Substance Use Topics    Alcohol use: No    Drug use: No        Review of Systems   Constitutional: Negative for activity change, appetite change, chills and fever  HENT: Positive for congestion, rhinorrhea and sinus pain  Negative for ear discharge, ear pain, postnasal drip, sinus pressure, sore throat and trouble swallowing  Eyes: Positive for blurred vision, photophobia, pain, discharge, redness and visual disturbance  Negative for double vision and itching  Respiratory: Negative for apnea, cough, choking, chest tightness, shortness of breath, wheezing and stridor  Cardiovascular: Negative for chest pain and palpitations     Gastrointestinal: Negative for abdominal distention, abdominal pain, blood in stool, constipation, diarrhea, nausea and vomiting  Endocrine: Negative for polydipsia, polyphagia and polyuria  Genitourinary: Negative for dysuria, flank pain, frequency, hematuria and urgency  Musculoskeletal: Negative for arthralgias, back pain, gait problem, myalgias and neck pain  Skin: Negative for color change, pallor, rash and wound  Neurological: Positive for headaches  Negative for dizziness, tingling, syncope, weakness, light-headedness and numbness  Left sided HA   Psychiatric/Behavioral: Negative for agitation, behavioral problems, confusion and decreased concentration  The patient is not nervous/anxious  Physical Exam  Physical Exam   Constitutional: She is oriented to person, place, and time  She appears well-developed and well-nourished  No distress  HENT:   Head: Normocephalic and atraumatic  Right Ear: External ear normal    Left Ear: External ear normal    Nose: Nose normal    Mouth/Throat: Oropharynx is clear and moist  No oropharyngeal exudate  No edema or erythema to floyd TMs   Eyes: Pupils are equal, round, and reactive to light  EOM are normal  Right eye exhibits discharge  Left eye exhibits discharge  No scleral icterus  Visual Acuity:   OD - 20/30  OS - 20/200    Eye Pressure:   OD - 9   OS - 8     Injected conjunctive floyd   Erythema noted to floyd eye lids  Edema noted to L eye lid   Clear drainage noted floyd   Corneal ulcer noted over L cornea   Neck: Normal range of motion  Neck supple  No JVD present  No tracheal deviation present  Cardiovascular: Normal rate, regular rhythm and normal heart sounds  No murmur heard  Pulmonary/Chest: Effort normal and breath sounds normal  No stridor  No respiratory distress  She has no wheezes  She has no rales  She exhibits no tenderness  Abdominal: Soft  There is no tenderness  Musculoskeletal: Normal range of motion  She exhibits no edema, tenderness or deformity     Neurological: She is alert and oriented to person, place, and time  No cranial nerve deficit or sensory deficit  She exhibits normal muscle tone  Skin: Skin is warm and dry  Capillary refill takes less than 2 seconds  No rash noted  There is erythema  No pallor  See Eyes   Psychiatric: She has a normal mood and affect   Her behavior is normal  Judgment and thought content normal        Vital Signs  ED Triage Vitals   Temperature Pulse Respirations Blood Pressure SpO2   12/23/19 1136 12/23/19 1136 12/23/19 1136 12/23/19 1136 12/23/19 1136   97 9 °F (36 6 °C) 66 18 (!) 182/81 97 %      Temp Source Heart Rate Source Patient Position - Orthostatic VS BP Location FiO2 (%)   12/23/19 1136 12/23/19 1136 12/23/19 1136 12/23/19 1136 --   Oral Monitor Sitting Right arm       Pain Score       12/23/19 1449       No Pain           Vitals:    12/23/19 1136 12/23/19 1449   BP: (!) 182/81 168/80   Pulse: 66 80   Patient Position - Orthostatic VS: Sitting Sitting         Visual Acuity      ED Medications  Medications   tetracaine 0 5 % ophthalmic solution 1 drop (1 drop Both Eyes Given 12/23/19 1242)   fluorescein sodium sterile ophthalmic strip 1 strip (1 strip Both Eyes Given 12/23/19 1242)   erythromycin (ILOTYCIN) 0 5 % ophthalmic ointment 0 5 inch (0 5 inches Left Eye Given 12/23/19 1439)       Diagnostic Studies  Results Reviewed     Procedure Component Value Units Date/Time    Comprehensive metabolic panel [[id number]]  (Abnormal) Collected:  12/23/19 1253    Lab Status:  Final result Specimen:  Blood from Arm, Right Updated:  12/23/19 1328     Sodium 139 mmol/L      Potassium 5 0 mmol/L      Chloride 104 mmol/L      CO2 26 mmol/L      ANION GAP 9 mmol/L      BUN 18 mg/dL      Creatinine 1 85 mg/dL      Glucose 95 mg/dL      Calcium 9 2 mg/dL      AST 37 U/L      ALT 34 U/L      Alkaline Phosphatase 81 U/L      Total Protein 8 1 g/dL      Albumin 3 8 g/dL      Total Bilirubin 0 37 mg/dL      eGFR 30 ml/min/1 73sq m     Narrative:       National Kidney Disease Foundation guidelines for Chronic Kidney Disease (CKD):     Stage 1 with normal or high GFR (GFR > 90 mL/min/1 73 square meters)    Stage 2 Mild CKD (GFR = 60-89 mL/min/1 73 square meters)    Stage 3A Moderate CKD (GFR = 45-59 mL/min/1 73 square meters)    Stage 3B Moderate CKD (GFR = 30-44 mL/min/1 73 square meters)    Stage 4 Severe CKD (GFR = 15-29 mL/min/1 73 square meters)    Stage 5 End Stage CKD (GFR <15 mL/min/1 73 square meters)  Note: GFR calculation is accurate only with a steady state creatinine    CBC and differential [[id number]]  (Abnormal) Collected:  12/23/19 1253    Lab Status:  Final result Specimen:  Blood from Arm, Right Updated:  12/23/19 1259     WBC 5 62 Thousand/uL      RBC 4 76 Million/uL      Hemoglobin 12 6 g/dL      Hematocrit 41 0 %      MCV 86 fL      MCH 26 5 pg      MCHC 30 7 g/dL      RDW 13 2 %      MPV 10 9 fL      Platelets 719 Thousands/uL      nRBC 0 /100 WBCs      Neutrophils Relative 67 %      Immat GRANS % 0 %      Lymphocytes Relative 23 %      Monocytes Relative 7 %      Eosinophils Relative 3 %      Basophils Relative 0 %      Neutrophils Absolute 3 74 Thousands/µL      Immature Grans Absolute 0 02 Thousand/uL      Lymphocytes Absolute 1 27 Thousands/µL      Monocytes Absolute 0 39 Thousand/µL      Eosinophils Absolute 0 18 Thousand/µL      Basophils Absolute 0 02 Thousands/µL                  No orders to display              Procedures  Procedures         ED Course                               MDM  Number of Diagnoses or Management Options  Conjunctivitis: new and requires workup  Corneal abrasion: new and requires workup     Amount and/or Complexity of Data Reviewed  Clinical lab tests: ordered and reviewed    Risk of Complications, Morbidity, and/or Mortality  Presenting problems: moderate  Diagnostic procedures: moderate  Management options: moderate  General comments:      The pt is a 48 yo female who presented to the ED with complaints of eye redness and swelling  The pt underwent a CBC and CMP in the ED  Which showed no acute abnormality  The pt's eyes were numbed with tetracaine and stained with fluorescein in the ED  The pt was noted to have a large corneal abrasion on the L eye  The pressure in the pt's eyes were also checked using the Tonopen  Yulisae was noted to have pressure of  8 OS and 9 OD  The pt's visual acuity was 20/30 OD and 20/200 OS  I reached out to Dr Eliz Arvizu with ophthalmology to discuss the pt with them and sent them a picture of the abrasion  He agreed to see the pt in his office as an outpatient this afternoon  He also wanted me to start erythromycin ointment TID  The pt was given this information and Dr Yumiko Godinez office address  She was agreeable to attend her appointment this afternoon  The pt was also given a tube of erythromycin in the ED  The pt was advised to follow up with her PCP in regard to her ED visit today and her sx, as well as Dr Eliz Arvizu  The pt was encouraged to return to the ED should she notice worsening eye pain, headaches, loss of vision, or fevers  Patient Progress  Patient progress: stable        Disposition  Final diagnoses:   Corneal abrasion   Conjunctivitis     Time reflects when diagnosis was documented in both MDM as applicable and the Disposition within this note     Time User Action Codes Description Comment    12/23/2019  2:27 PM Bresue Bird Add [S05 00XA] Corneal abrasion     12/23/2019  2:27 PM Fanny Bird Add [H10 9] Conjunctivitis       ED Disposition     ED Disposition Condition Date/Time Comment    Discharge Stable Mon Dec 23, 2019  2:27 PM Namita Vila discharge to home/self care              Follow-up Information     Follow up With Specialties Details Why Alyse Fisher MD Internal Medicine  As needed [facility] [address]      Benitez Finely, DO Ophthalmology Go on 12/23/2019 Please go to Dr Yumiko Godinez office this afternoon for follow up on your corneal abrasion  [address]  [address] [facility]            Discharge Medication List as of 12/23/2019  2:41 PM      CONTINUE these medications which have NOT CHANGED    Details   amLODIPine (NORVASC) 10 mg tablet 1/2 tab daily, No Print      clindamycin (CLEOCIN) 150 mg capsule Take 3 capsules (450 mg total) by mouth every 8 (eight) hours for 10 days, Starting Sun 12/15/2019, Until Wed 12/25/2019, Normal      cyclobenzaprine (FLEXERIL) 5 mg tablet cyclobenzaprine 5 mg tablet, Historical Med      famotidine (PEPCID) 20 mg tablet Take 20 mg by mouth daily, Historical Med      !! furosemide (LASIX) 20 mg tablet Take 1 tablet (20 mg total) by mouth daily as needed (leg swelling) Do not take more than 3 times a week for now , Starting Thu 10/10/2019, Print      !! furosemide (LASIX) 20 mg tablet take 1 tablet by mouth daily, Normal      metoprolol succinate (TOPROL-XL) 50 mg 24 hr tablet Take one and 1/2 tabs  Twice a day ( 75 mg BID), Normal      nicotine (NICODERM CQ) 14 mg/24hr TD 24 hr patch Place 1 patch on the skin daily, Starting Wed 9/11/2019, Normal      pantoprazole (PROTONIX) 40 mg tablet take 1 tablet by mouth daily 30 MINUTES BEFORE BREAKFAST, Normal      rivaroxaban (XARELTO) 20 mg tablet Take 20 mg by mouth daily with breakfast, Historical Med      traMADol (ULTRAM) 50 mg tablet Take 50 mg by mouth every 6 (six) hours as needed for moderate pain, Historical Med       !! - Potential duplicate medications found  Please discuss with provider  No discharge procedures on file      ED Provider  Electronically Signed by           Antonio Guillaume PA-C  12/23/19 9455

## 2019-12-23 NOTE — DISCHARGE INSTRUCTIONS
You were noted to have a large corneal abrasion onyour left eye today in the ED  You have been given erythromycin ointment to apply to your left eye three times a day  An appointment has been set up for you with Dr Anup Finnegan, an ophthalmologist, for this afternoon for follow up on your corneal abrasion  You are encouraged to attend this appointment  You are encouraged to follow up with you primary care provider as well in regard to your ED visit today  You are encouraged to return to the ED should you notice increased pain in your eye, loss of vision, worsening headaches, and fevers

## 2020-03-03 DIAGNOSIS — I48.91 ATRIAL FIBRILLATION WITH RVR (HCC): Chronic | ICD-10-CM

## 2020-03-03 RX ORDER — METOPROLOL SUCCINATE 50 MG/1
TABLET, EXTENDED RELEASE ORAL
Qty: 90 TABLET | Refills: 1 | Status: SHIPPED | OUTPATIENT
Start: 2020-03-03 | End: 2020-03-10 | Stop reason: HOSPADM

## 2020-03-09 ENCOUNTER — APPOINTMENT (INPATIENT)
Dept: ULTRASOUND IMAGING | Facility: HOSPITAL | Age: 55
DRG: 201 | End: 2020-03-09
Payer: COMMERCIAL

## 2020-03-09 ENCOUNTER — APPOINTMENT (INPATIENT)
Dept: RADIOLOGY | Facility: HOSPITAL | Age: 55
DRG: 201 | End: 2020-03-09
Payer: COMMERCIAL

## 2020-03-09 ENCOUNTER — HOSPITAL ENCOUNTER (INPATIENT)
Facility: HOSPITAL | Age: 55
LOS: 1 days | Discharge: HOME | DRG: 201 | End: 2020-03-10
Attending: EMERGENCY MEDICINE | Admitting: INTERNAL MEDICINE
Payer: COMMERCIAL

## 2020-03-09 DIAGNOSIS — R07.9 CHEST PAIN: ICD-10-CM

## 2020-03-09 DIAGNOSIS — I48.91 ATRIAL FIBRILLATION, UNSPECIFIED TYPE (HCC): Chronic | ICD-10-CM

## 2020-03-09 DIAGNOSIS — I48.91 RAPID ATRIAL FIBRILLATION (HCC): Primary | ICD-10-CM

## 2020-03-09 PROBLEM — I50.32 CHRONIC DIASTOLIC CHF (CONGESTIVE HEART FAILURE) (HCC): Status: ACTIVE | Noted: 2020-03-09

## 2020-03-09 PROBLEM — N18.30 STAGE 3 CHRONIC KIDNEY DISEASE (HCC): Status: ACTIVE | Noted: 2020-03-09

## 2020-03-09 PROBLEM — R42 LIGHTHEADEDNESS: Status: ACTIVE | Noted: 2020-03-09

## 2020-03-09 LAB
ALBUMIN SERPL BCP-MCNC: 4.3 G/DL (ref 3.5–5)
ALP SERPL-CCNC: 90 U/L (ref 46–116)
ALT SERPL W P-5'-P-CCNC: 30 U/L (ref 12–78)
AMPHETAMINES SERPL QL SCN: NEGATIVE
ANION GAP SERPL CALCULATED.3IONS-SCNC: 11 MMOL/L (ref 4–13)
APTT PPP: 26 SECONDS (ref 23–37)
AST SERPL W P-5'-P-CCNC: 35 U/L (ref 5–45)
ATRIAL RATE: 147 BPM
BARBITURATES UR QL: NEGATIVE
BASOPHILS # BLD AUTO: 0.02 THOUSANDS/ΜL (ref 0–0.1)
BASOPHILS NFR BLD AUTO: 0 % (ref 0–1)
BENZODIAZ UR QL: NEGATIVE
BILIRUB SERPL-MCNC: 0.36 MG/DL (ref 0.2–1)
BUN SERPL-MCNC: 14 MG/DL (ref 5–25)
CALCIUM SERPL-MCNC: 9.2 MG/DL (ref 8.3–10.1)
CHLORIDE SERPL-SCNC: 103 MMOL/L (ref 100–108)
CO2 SERPL-SCNC: 26 MMOL/L (ref 21–32)
COCAINE UR QL: POSITIVE
CREAT SERPL-MCNC: 1.57 MG/DL (ref 0.6–1.3)
D DIMER PPP FEU-MCNC: 1.42 UG/ML FEU
EOSINOPHIL # BLD AUTO: 0.18 THOUSAND/ΜL (ref 0–0.61)
EOSINOPHIL NFR BLD AUTO: 3 % (ref 0–6)
ERYTHROCYTE [DISTWIDTH] IN BLOOD BY AUTOMATED COUNT: 13.8 % (ref 11.6–15.1)
ERYTHROCYTE [DISTWIDTH] IN BLOOD BY AUTOMATED COUNT: 13.8 % (ref 11.6–15.1)
GFR SERPL CREATININE-BSD FRML MDRD: 37 ML/MIN/1.73SQ M
GLUCOSE SERPL-MCNC: 97 MG/DL (ref 65–140)
HCT VFR BLD AUTO: 37.7 % (ref 34.8–46.1)
HCT VFR BLD AUTO: 43.8 % (ref 34.8–46.1)
HGB BLD-MCNC: 12.2 G/DL (ref 11.5–15.4)
HGB BLD-MCNC: 13.9 G/DL (ref 11.5–15.4)
IMM GRANULOCYTES # BLD AUTO: 0.01 THOUSAND/UL (ref 0–0.2)
IMM GRANULOCYTES NFR BLD AUTO: 0 % (ref 0–2)
INR PPP: 1.04 (ref 0.84–1.19)
LYMPHOCYTES # BLD AUTO: 1.67 THOUSANDS/ΜL (ref 0.6–4.47)
LYMPHOCYTES NFR BLD AUTO: 24 % (ref 14–44)
MCH RBC QN AUTO: 27 PG (ref 26.8–34.3)
MCH RBC QN AUTO: 27.4 PG (ref 26.8–34.3)
MCHC RBC AUTO-ENTMCNC: 31.7 G/DL (ref 31.4–37.4)
MCHC RBC AUTO-ENTMCNC: 32.4 G/DL (ref 31.4–37.4)
MCV RBC AUTO: 85 FL (ref 82–98)
MCV RBC AUTO: 85 FL (ref 82–98)
METHADONE UR QL: NEGATIVE
MONOCYTES # BLD AUTO: 0.47 THOUSAND/ΜL (ref 0.17–1.22)
MONOCYTES NFR BLD AUTO: 7 % (ref 4–12)
NEUTROPHILS # BLD AUTO: 4.61 THOUSANDS/ΜL (ref 1.85–7.62)
NEUTS SEG NFR BLD AUTO: 66 % (ref 43–75)
NRBC BLD AUTO-RTO: 0 /100 WBCS
OPIATES UR QL SCN: POSITIVE
P AXIS: 0 DEGREES
PCP UR QL: NEGATIVE
PLATELET # BLD AUTO: 137 THOUSANDS/UL (ref 149–390)
PLATELET # BLD AUTO: 174 THOUSANDS/UL (ref 149–390)
PMV BLD AUTO: 10.9 FL (ref 8.9–12.7)
PMV BLD AUTO: 10.9 FL (ref 8.9–12.7)
POTASSIUM SERPL-SCNC: 4.1 MMOL/L (ref 3.5–5.3)
PR INTERVAL: 192 MS
PROT SERPL-MCNC: 9.1 G/DL (ref 6.4–8.2)
PROTHROMBIN TIME: 13 SECONDS (ref 11.6–14.5)
QRS AXIS: -47 DEGREES
QRSD INTERVAL: 138 MS
QT INTERVAL: 348 MS
QTC INTERVAL: 544 MS
RBC # BLD AUTO: 4.45 MILLION/UL (ref 3.81–5.12)
RBC # BLD AUTO: 5.15 MILLION/UL (ref 3.81–5.12)
SODIUM SERPL-SCNC: 140 MMOL/L (ref 136–145)
T WAVE AXIS: 102 DEGREES
THC UR QL: NEGATIVE
TROPONIN I SERPL-MCNC: 0.05 NG/ML
TROPONIN I SERPL-MCNC: 0.1 NG/ML
TROPONIN I SERPL-MCNC: 0.17 NG/ML
TROPONIN I SERPL-MCNC: 0.17 NG/ML
VENTRICULAR RATE: 147 BPM
WBC # BLD AUTO: 5.55 THOUSAND/UL (ref 4.31–10.16)
WBC # BLD AUTO: 6.96 THOUSAND/UL (ref 4.31–10.16)

## 2020-03-09 PROCEDURE — 85610 PROTHROMBIN TIME: CPT | Performed by: EMERGENCY MEDICINE

## 2020-03-09 PROCEDURE — 85027 COMPLETE CBC AUTOMATED: CPT | Performed by: PHYSICIAN ASSISTANT

## 2020-03-09 PROCEDURE — 36415 COLL VENOUS BLD VENIPUNCTURE: CPT | Performed by: EMERGENCY MEDICINE

## 2020-03-09 PROCEDURE — 93005 ELECTROCARDIOGRAM TRACING: CPT

## 2020-03-09 PROCEDURE — 96361 HYDRATE IV INFUSION ADD-ON: CPT

## 2020-03-09 PROCEDURE — 96374 THER/PROPH/DIAG INJ IV PUSH: CPT

## 2020-03-09 PROCEDURE — 96376 TX/PRO/DX INJ SAME DRUG ADON: CPT

## 2020-03-09 PROCEDURE — 99291 CRITICAL CARE FIRST HOUR: CPT

## 2020-03-09 PROCEDURE — 93970 EXTREMITY STUDY: CPT

## 2020-03-09 PROCEDURE — 80307 DRUG TEST PRSMV CHEM ANLYZR: CPT | Performed by: PHYSICIAN ASSISTANT

## 2020-03-09 PROCEDURE — 84484 ASSAY OF TROPONIN QUANT: CPT | Performed by: INTERNAL MEDICINE

## 2020-03-09 PROCEDURE — 93010 ELECTROCARDIOGRAM REPORT: CPT | Performed by: INTERNAL MEDICINE

## 2020-03-09 PROCEDURE — 84484 ASSAY OF TROPONIN QUANT: CPT | Performed by: EMERGENCY MEDICINE

## 2020-03-09 PROCEDURE — 85730 THROMBOPLASTIN TIME PARTIAL: CPT | Performed by: EMERGENCY MEDICINE

## 2020-03-09 PROCEDURE — 99223 1ST HOSP IP/OBS HIGH 75: CPT | Performed by: INTERNAL MEDICINE

## 2020-03-09 PROCEDURE — 71046 X-RAY EXAM CHEST 2 VIEWS: CPT

## 2020-03-09 PROCEDURE — 85379 FIBRIN DEGRADATION QUANT: CPT | Performed by: INTERNAL MEDICINE

## 2020-03-09 PROCEDURE — 99285 EMERGENCY DEPT VISIT HI MDM: CPT | Performed by: EMERGENCY MEDICINE

## 2020-03-09 PROCEDURE — 85025 COMPLETE CBC W/AUTO DIFF WBC: CPT | Performed by: EMERGENCY MEDICINE

## 2020-03-09 PROCEDURE — 80053 COMPREHEN METABOLIC PANEL: CPT | Performed by: EMERGENCY MEDICINE

## 2020-03-09 PROCEDURE — 84484 ASSAY OF TROPONIN QUANT: CPT | Performed by: PHYSICIAN ASSISTANT

## 2020-03-09 RX ORDER — HEPARIN SODIUM 10000 [USP'U]/100ML
3-30 INJECTION, SOLUTION INTRAVENOUS
Status: DISCONTINUED | OUTPATIENT
Start: 2020-03-10 | End: 2020-03-10

## 2020-03-09 RX ORDER — PANTOPRAZOLE SODIUM 40 MG/1
40 TABLET, DELAYED RELEASE ORAL
Status: DISCONTINUED | OUTPATIENT
Start: 2020-03-10 | End: 2020-03-10 | Stop reason: HOSPADM

## 2020-03-09 RX ORDER — HEPARIN SODIUM 1000 [USP'U]/ML
3800 INJECTION, SOLUTION INTRAVENOUS; SUBCUTANEOUS AS NEEDED
Status: DISCONTINUED | OUTPATIENT
Start: 2020-03-10 | End: 2020-03-10

## 2020-03-09 RX ORDER — HEPARIN SODIUM 1000 [USP'U]/ML
7600 INJECTION, SOLUTION INTRAVENOUS; SUBCUTANEOUS ONCE
Status: COMPLETED | OUTPATIENT
Start: 2020-03-10 | End: 2020-03-10

## 2020-03-09 RX ORDER — NICOTINE 21 MG/24HR
1 PATCH, TRANSDERMAL 24 HOURS TRANSDERMAL DAILY
Status: DISCONTINUED | OUTPATIENT
Start: 2020-03-10 | End: 2020-03-10 | Stop reason: HOSPADM

## 2020-03-09 RX ORDER — DILTIAZEM HYDROCHLORIDE 5 MG/ML
20 INJECTION INTRAVENOUS ONCE
Status: COMPLETED | OUTPATIENT
Start: 2020-03-09 | End: 2020-03-09

## 2020-03-09 RX ORDER — FAMOTIDINE 20 MG/1
20 TABLET, FILM COATED ORAL DAILY
Status: DISCONTINUED | OUTPATIENT
Start: 2020-03-10 | End: 2020-03-10 | Stop reason: HOSPADM

## 2020-03-09 RX ORDER — ASPIRIN 325 MG
325 TABLET ORAL ONCE
Status: COMPLETED | OUTPATIENT
Start: 2020-03-09 | End: 2020-03-09

## 2020-03-09 RX ORDER — HEPARIN SODIUM 1000 [USP'U]/ML
7600 INJECTION, SOLUTION INTRAVENOUS; SUBCUTANEOUS AS NEEDED
Status: DISCONTINUED | OUTPATIENT
Start: 2020-03-10 | End: 2020-03-10

## 2020-03-09 RX ORDER — ACETAMINOPHEN 325 MG/1
650 TABLET ORAL ONCE
Status: COMPLETED | OUTPATIENT
Start: 2020-03-09 | End: 2020-03-09

## 2020-03-09 RX ORDER — ONDANSETRON 2 MG/ML
4 INJECTION INTRAMUSCULAR; INTRAVENOUS EVERY 6 HOURS PRN
Status: DISCONTINUED | OUTPATIENT
Start: 2020-03-09 | End: 2020-03-10 | Stop reason: HOSPADM

## 2020-03-09 RX ORDER — DILTIAZEM HYDROCHLORIDE 5 MG/ML
10 INJECTION INTRAVENOUS ONCE
Status: COMPLETED | OUTPATIENT
Start: 2020-03-09 | End: 2020-03-09

## 2020-03-09 RX ADMIN — DILTIAZEM HYDROCHLORIDE 10 MG: 5 INJECTION INTRAVENOUS at 14:02

## 2020-03-09 RX ADMIN — RIVAROXABAN 15 MG: 15 TABLET, FILM COATED ORAL at 15:45

## 2020-03-09 RX ADMIN — ASPIRIN 325 MG ORAL TABLET 325 MG: 325 PILL ORAL at 14:02

## 2020-03-09 RX ADMIN — DILTIAZEM HYDROCHLORIDE 5 MG/HR: 5 INJECTION INTRAVENOUS at 20:13

## 2020-03-09 RX ADMIN — MORPHINE SULFATE 2 MG: 2 INJECTION, SOLUTION INTRAMUSCULAR; INTRAVENOUS at 16:44

## 2020-03-09 RX ADMIN — ACETAMINOPHEN 650 MG: 325 TABLET, FILM COATED ORAL at 14:46

## 2020-03-09 RX ADMIN — DILTIAZEM HYDROCHLORIDE 20 MG: 5 INJECTION INTRAVENOUS at 14:47

## 2020-03-09 RX ADMIN — SODIUM CHLORIDE 500 ML: 0.9 INJECTION, SOLUTION INTRAVENOUS at 13:59

## 2020-03-09 RX ADMIN — DILTIAZEM HYDROCHLORIDE 5 MG/HR: 5 INJECTION INTRAVENOUS at 15:39

## 2020-03-09 RX ADMIN — METOPROLOL SUCCINATE 75 MG: 25 TABLET, EXTENDED RELEASE ORAL at 18:37

## 2020-03-09 NOTE — ASSESSMENT & PLAN NOTE
Hold norvasc while on IV cardizem  Continue toprol and lasix and IV cardizem gtt  Continue to monitor

## 2020-03-09 NOTE — ASSESSMENT & PLAN NOTE
Atypical chest pain this is likely in the setting of AFib RVR  No reported episodes of per patient or noted with interrogation of AICD in ED  Troponin is mildly elevated which is nonspecific given renal dysfunction  Will trend EKGs and troponins for completeness  Received aspirin 325 mg in ED Will continue with anticoagulation for AFib as above  Check cxr pa/lateral to r/o volume overload

## 2020-03-09 NOTE — ASSESSMENT & PLAN NOTE
Wt Readings from Last 3 Encounters:   03/09/20 99 8 kg (220 lb)   12/23/19 97 4 kg (214 lb 11 6 oz)   12/15/19 102 kg (223 lb 12 3 oz)       Remote TTE w/grade 1 diastolic dysfunction and marked septal hypertrophy w/o dynamic obstruction in setting of a fib/flutter and hx of v tach s/p aicd  Will repeat tte to evaluate for further structural disease/eval for LAE worsening septal thickening given RVR of unclear etiology  Continue lasix 20mg po daily, toprol, daily weights I/os

## 2020-03-09 NOTE — ASSESSMENT & PLAN NOTE
Acute onset of RVR with patient with known AFib maintained on toprol xl 75mg bid  Was previously anticoagulated with Xarelto which patient has not had the last 2 months due to follow-up issues and transportation issues  -check uds  -check tsh repeat TTE, check cxr to r/o volume overload  Received IV Cardizem 10 mg bolus as well as 20 mg bolus and 80 no started on Cardizem drip still tachycardiac within the 120s  Will continue with IV Cardizem drip with goal of heart rate under 110 ppm as patient's symptoms have improved with lenient control  Continue Toprol 75 mg b i d   Restart xarelto at 15mg po daily for renal dysfunction  Consult cardiology for further management

## 2020-03-09 NOTE — ED PROVIDER NOTES
History  Chief Complaint   Patient presents with    Chest Pain     cp sob onset 36     Patient is a 30-year-old female presents to the emergency department sharing that she has atrial fibrillation and an AICD  She relates that today at about 11 30 she became [address]    She relates I can't breathe and she describes it feeling like something is sitting on my chest   These sensations have persisted  They do feel similar to that experienced in the past with rapid AFib  Patient relates that she has been out of her Xarelto for the last couple of months  She reports compliance with all other medications  She notes that she had missed a physician appointment due to lack of transportation and is now waiting to get back into the Dr  To reinitiate Xarelto  She denies any other changes in activity, diet or medication  Medical history significant for hypertrophic cardiomyopathy, hypertension GERD atrial fibrillation/flutter/SVT as well as V-tach in  for which AICD was placed  She reports use of aspirin 81 mg daily  Most recent cardiology note from 2019 reviewed  Patient was seen in follow-up at that time from hospitalization for rapid AFib  Beta-blocker was titrated up  It is noted the patient had a cardiac catheterization in early  which revealed only minimal luminal irregularities  Prior to Admission Medications   Prescriptions Last Dose Informant Patient Reported? Taking? amLODIPine (NORVASC) 10 mg tablet   No No   Si/2 tab daily   cyclobenzaprine (FLEXERIL) 5 mg tablet   Yes No   Sig: cyclobenzaprine 5 mg tablet   famotidine (PEPCID) 20 mg tablet  Self Yes No   Sig: Take 20 mg by mouth daily   furosemide (LASIX) 20 mg tablet   No No   Sig: Take 1 tablet (20 mg total) by mouth daily as needed (leg swelling) Do not take more than 3 times a week for now     furosemide (LASIX) 20 mg tablet   No No   Sig: take 1 tablet by mouth daily   metoprolol succinate (TOPROL-XL) 50 mg 24 hr tablet   No No   Sig: Take one and 1/2 tabs  Twice a day ( 75 mg BID)   nicotine (NICODERM CQ) 14 mg/24hr TD 24 hr patch  Self No No   Sig: Place 1 patch on the skin daily   Patient not taking: Reported on 9/25/2019   pantoprazole (PROTONIX) 40 mg tablet  Self No No   Sig: take 1 tablet by mouth daily 30 MINUTES BEFORE BREAKFAST   rivaroxaban (XARELTO) 20 mg tablet  Self Yes No   Sig: Take 20 mg by mouth daily with breakfast   traMADol (ULTRAM) 50 mg tablet  Self Yes No   Sig: Take 50 mg by mouth every 6 (six) hours as needed for moderate pain      Facility-Administered Medications: None       Past Medical History:   Diagnosis Date    Arthritis     Atrial fibrillation (HCC)     Atrial fibrillation (HCC)     Breast lump     GERD (gastroesophageal reflux disease)     H/O transfusion 1987    Hepatitis C     resolved    Hepatitis C     Hyperlipidemia     Hypertension     Irregular heart beat     Pacemaker     Sleep apnea     no cpap       Past Surgical History:   Procedure Laterality Date    CARDIAC CATHETERIZATION  01/07/2019    CARDIAC DEFIBRILLATOR PLACEMENT      CARDIAC PACEMAKER PLACEMENT  2016    AFIB     CHOLECYSTECTOMY      COLONOSCOPY      ELBOW SURGERY      HYSTERECTOMY      HYSTERECTOMY      JOINT REPLACEMENT Left 2015    TKR    KNEE SURGERY Left     MN ESOPHAGOGASTRODUODENOSCOPY TRANSORAL DIAGNOSTIC N/A 5/2/2018    Procedure: ESOPHAGOGASTRODUODENOSCOPY (EGD); Surgeon: Airam Richardson MD;  Location: [facility];   Service: Gastroenterology    MN LARYNGOSCOPY,DIRCT,[facility] N/A 8/10/2018    Procedure: MICRO DIRECT LARYNGOSCOPY , EXCISION OF POLYPS, KTP LASER;  Surgeon: Jose Priest MD;  Location[facility];  Service: ENT    REPLACEMENT TOTAL KNEE Left     THROAT SURGERY      polyps removed       Family History   Problem Relation Age of Onset    Arthritis Family     Cancer Family     Diabetes Family     Hypertension Family      I have reviewed and agree with the history as documented  E-Cigarette/Vaping     E-Cigarette/Vaping Substances     Social History     Tobacco Use    Smoking status: Current Every Day Smoker     Packs/day: 0 50     Years: 0 00     Pack years: 0 00     Types: Cigarettes    Smokeless tobacco: Never Used    Tobacco comment: about 3 daily   Substance Use Topics    Alcohol use: No    Drug use: No       Review of Systems   Constitutional: Negative for fever  Respiratory: Negative for cough  Cardiovascular: Negative for leg swelling  All other systems reviewed and are negative  Physical Exam  Physical Exam   Constitutional: She is oriented to person, place, and time  She appears well-developed and well-nourished  Patient appears mildly malaised  HENT:   Head: Normocephalic  Eyes: Pupils are equal, round, and reactive to light  EOM are normal    Neck: Normal range of motion  Cardiovascular: An irregularly irregular rhythm present  Tachycardia present  Pulmonary/Chest: Effort normal and breath sounds normal    Abdominal: Soft  There is no tenderness  Musculoskeletal:        Right lower leg: She exhibits no tenderness and no edema  Left lower leg: She exhibits no tenderness and no edema  Neurological: She is alert and oriented to person, place, and time  Skin: Skin is warm and dry  Psychiatric: She has a normal mood and affect  Her behavior is normal    Nursing note and vitals reviewed        Vital Signs  ED Triage Vitals [03/09/20 1335]   Temperature Pulse Respirations Blood Pressure SpO2   97 8 °F (36 6 °C) 74 16 167/88 98 %      Temp Source Heart Rate Source Patient Position - Orthostatic VS BP Location FiO2 (%)   Oral Monitor Sitting Left arm --      Pain Score       Worst Possible Pain           Vitals:    03/09/20 1335 03/09/20 1448   BP: 167/88 159/77   Pulse: 74 (!) 111   Patient Position - Orthostatic VS: Sitting          Visual Acuity      ED Medications  Medications   sodium chloride 0 9 % bolus 500 mL (0 mL Intravenous Stopped 3/9/20 1542)   diltiazem (CARDIZEM) injection 10 mg (10 mg Intravenous Given 3/9/20 1402)   aspirin tablet 325 mg (325 mg Oral Given 3/9/20 1402)   diltiazem (CARDIZEM) injection 20 mg (20 mg Intravenous Given 3/9/20 1447)   acetaminophen (TYLENOL) tablet 650 mg (650 mg Oral Given 3/9/20 1446)   rivaroxaban (XARELTO) tablet 15 mg (15 mg Oral Given 3/9/20 1545)   diltiazem (CARDIZEM) 125 mg in sodium chloride 0 9 % 125 mL infusion (5 mg/hr Intravenous New Bag 3/9/20 1539)       Diagnostic Studies  Results Reviewed     Procedure Component Value Units Date/Time    TSH, 3rd generation with Free T4 reflex [[id number]]     Lab Status:  No result Specimen:  Blood     Protime-INR [[id number]]  (Normal) Collected:  03/09/20 1357    Lab Status:  Final result Specimen:  Blood from Arm, Right Updated:  03/09/20 1457     Protime 13 0 seconds      INR 1 04    APTT [[id number]]  (Normal) Collected:  03/09/20 1357    Lab Status:  Final result Specimen:  Blood from Arm, Right Updated:  03/09/20 1457     PTT 26 seconds     Troponin I [[id number]]  (Abnormal) Collected:  03/09/20 1357    Lab Status:  Final result Specimen:  Blood from Arm, Right Updated:  03/09/20 1429     Troponin I 0 05 ng/mL     Comprehensive metabolic panel [[id number]]  (Abnormal) Collected:  03/09/20 1357    Lab Status:  Final result Specimen:  Blood from Arm, Right Updated:  03/09/20 1424     Sodium 140 mmol/L      Potassium 4 1 mmol/L      Chloride 103 mmol/L      CO2 26 mmol/L      ANION GAP 11 mmol/L      BUN 14 mg/dL      Creatinine 1 57 mg/dL      Glucose 97 mg/dL      Calcium 9 2 mg/dL      AST 35 U/L      ALT 30 U/L      Alkaline Phosphatase 90 U/L      Total Protein 9 1 g/dL      Albumin 4 3 g/dL      Total Bilirubin 0 36 mg/dL      eGFR 37 ml/min/1 73sq m     Narrative:       [place] guidelines for Chronic Kidney Disease (CKD):     Stage 1 with normal or high GFR (GFR > 90 mL/min/1 73 square meters)   Stage 2 Mild CKD (GFR = 60-89 mL/min/1 73 square meters)    Stage 3A Moderate CKD (GFR = 45-59 mL/min/1 73 square meters)    Stage 3B Moderate CKD (GFR = 30-44 mL/min/1 73 square meters)    Stage 4 Severe CKD (GFR = 15-29 mL/min/1 73 square meters)    Stage 5 End Stage CKD (GFR <15 mL/min/1 73 square meters)  Note: GFR calculation is accurate only with a steady state creatinine    CBC and differential [[id number]]  (Abnormal) Collected:  03/09/20 1357    Lab Status:  Final result Specimen:  Blood from Arm, Right Updated:  03/09/20 1416     WBC 6 96 Thousand/uL      RBC 5 15 Million/uL      Hemoglobin 13 9 g/dL      Hematocrit 43 8 %      MCV 85 fL      MCH 27 0 pg      MCHC 31 7 g/dL      RDW 13 8 %      MPV 10 9 fL      Platelets 946 Thousands/uL      nRBC 0 /100 WBCs      Neutrophils Relative 66 %      Immat GRANS % 0 %      Lymphocytes Relative 24 %      Monocytes Relative 7 %      Eosinophils Relative 3 %      Basophils Relative 0 %      Neutrophils Absolute 4 61 Thousands/µL      Immature Grans Absolute 0 01 Thousand/uL      Lymphocytes Absolute 1 67 Thousands/µL      Monocytes Absolute 0 47 Thousand/µL      Eosinophils Absolute 0 18 Thousand/µL      Basophils Absolute 0 02 Thousands/µL                  XR chest 2 views    (Results Pending)              Procedures  ECG 12 Lead Documentation Only  Date/Time: 3/9/2020 1:55 PM  Performed by: Poonam Wells MD  Authorized by: Poonam Wells MD     Previous ECG:     Previous ECG:  Compared to current    Comparison ECG info:  October 9, 2019-very similar morphology with rate of 151 at that time  Rate:     ECG rate:  147    ECG rate assessment: tachycardic    Rhythm:     Rhythm: atrial fibrillation    QRS:     QRS axis:  Left    QRS intervals:   Wide  Conduction:     Conduction: abnormal      Abnormal conduction: complete RBBB    ST segments:     ST segments:  Normal  T waves:     T waves: inverted      Inverted:  I, aVL and V2 (biphasic V3)    CriticalCare Time  Performed by: Vanda Gamble MD  Authorized by: Vanda Gamble MD     Critical care provider statement:     Critical care time (minutes):  30    Critical care was necessary to treat or prevent imminent or life-threatening deterioration of the following conditions:  Circulatory failure and cardiac failure    Critical care was time spent personally by me on the following activities:  Obtaining history from patient or surrogate, examination of patient, ordering and performing treatments and interventions, ordering and review of laboratory studies, ordering and review of radiographic studies, re-evaluation of patient's condition, review of old charts, evaluation of patient's response to treatment, development of treatment plan with patient or surrogate and discussions with consultants             ED Course  ED Course as of Mar 09 1547   Mon Mar 09, 2020   1432 Troponin is very mildly elevated  Patient has had similar elevations previously with rapid AFib  Patient does have CKD  Creatinine is lower than prior checks/GFR slightly higher  1435 Patient reports feeling some improvement her symptoms  Chest pressure is currently mild  Heart rate now ranging from the 110s to low 130s as opposed to the 130s and 140s where she was on my initial evaluation  Additional Cardizem be given  Anticipate her coming in for observation especially in setting of having off of anticoagulation  She does currently note HA  Acetaminophen will be given      1519 Patient did not have significant improvement in heart rate following 2nd dose of Cardizem  Heart rate currently in the 120s  Infusion being initiated  This was required to the lower relief in abort AFib last admission   SLIM paged                                  MDM      Disposition  Final diagnoses:   Rapid atrial fibrillation (HCC)   Chest pain     Time reflects when diagnosis was documented in both MDM as applicable and the Disposition within this note     Time User Action Codes Description Comment    3/9/2020  3:27 PM Gisela Hodan A Add [I48 91] Rapid atrial fibrillation ([address] )     3/9/2020  3:27 PM Gisela Hodan A Add [R07 9] Chest pain       ED Disposition     ED Disposition Condition Date/Time Comment    Admit Stable Mon Mar 9, 2020  3:27 PM Case was discussed with NICHOLAS Paniagua and the patient's admission status was agreed to be Admission Status: inpatient status to the service of Dr Lydia Longo   Follow-up Information    None         Patient's Medications   Discharge Prescriptions    No medications on file     No discharge procedures on file      PDMP Review       Value Time User    PDMP Reviewed  Yes 10/9/2019 11:48 PM Quincy Stanton MD          ED Provider  Electronically Signed by           Bashir Matta MD  03/09/20 4415

## 2020-03-09 NOTE — ASSESSMENT & PLAN NOTE
Creatinine appears to be at baseline  Continue to monitor on oral lasix   Avoid relative hypotension/nephrotoxins

## 2020-03-09 NOTE — ASSESSMENT & PLAN NOTE
Known to Dr Ana George, electrophysiology  AICD was interrogated in ED with single episode of NSVT since October 2019 which did not require defibrillation  Monitor electrolytes on telemetry maintain 4 and magnesium than 2  Outpatient follow-up EP

## 2020-03-09 NOTE — H&P
H&P- Marva Villanueva 1965, [address] y o  female MRN: [id number]    Unit/Bed#: PRETTY Encounter: [id number]    Primary Care Provider: Delta Sun MD   Date and time admitted to hospital: 3/9/2020  1:43 PM        * Atrial fibrillation [facility])  Assessment & Plan  Acute onset of RVR with patient with known AFib maintained on toprol xl 75mg bid  Was previously anticoagulated with Xarelto which patient has not had the last 2 months due to follow-up issues and transportation issues  -check uds  -check tsh repeat TTE, check cxr to r/o volume overload  Received IV Cardizem 10 mg bolus as well as 20 mg bolus and 80 no started on Cardizem drip still tachycardiac within the 120s  Will continue with IV Cardizem drip with goal of heart rate under 110 ppm as patient's symptoms have improved with lenient control  Continue Toprol 75 mg b i d   Restart xarelto at 15mg po daily for renal dysfunction  Consult cardiology for further management    Chest pain  Assessment & Plan  Atypical chest pain this is likely in the setting of AFib RVR  No reported episodes of per patient or noted with interrogation of AICD in ED  Troponin is mildly elevated which is nonspecific given renal dysfunction  Will trend EKGs and troponins for completeness  Received aspirin 325 mg in ED Will continue with anticoagulation for AFib as above  Check cxr pa/lateral to r/o volume overload      Chronic diastolic CHF (congestive heart failure) ([facility] [address] )  Assessment & Plan  Wt Readings from Last 3 Encounters:   03/09/20 99 8 kg (220 lb)   12/23/19 97 4 kg (214 lb 11 6 oz)   12/15/19 102 kg (223 lb 12 3 oz)       Remote TTE w/grade 1 diastolic dysfunction and marked septal hypertrophy w/o dynamic obstruction in setting of a fib/flutter and hx of v tach s/p aicd  Will repeat tte to evaluate for further structural disease/eval for LAE worsening septal thickening given RVR of unclear etiology  Continue lasix 20mg po daily, toprol, daily weights I/os    Stage 3 chronic kidney disease ([facility] [address] )  Assessment & Plan  Creatinine appears to be at baseline  Continue to monitor on oral lasix   Avoid relative hypotension/nephrotoxins    Lightheadedness  Assessment & Plan  Likely secondary to AFib with RVR  No LOC or other focal neuro deficits    History of ventricular tachycardia ([facility] [address] ) with ICD  Assessment & Plan  Known to Dr Eunice Perez, electrophysiology  AICD was interrogated in ED with single episode of NSVT since October 2019 which did not require defibrillation  Monitor electrolytes on telemetry maintain 4 and magnesium than 2  Outpatient follow-up EP    Essential hypertension  Assessment & Plan  Hold norvasc while on IV cardizem  Continue toprol and lasix and IV cardizem gtt  Continue to monitor        VTE Prophylaxis: Rivaroxaban (Xarelto)  / sequential compression device   Code Status: fc  POLST: There is no POLST form on file for this patient (pre-hospital)  Discussion with family:     Anticipated Length of Stay:  Patient will be admitted on an Inpatient basis with an anticipated length of stay of  Greater than 2 midnights  Justification for Hospital Stay: chest pain w/likely a fib w/rvr    Total Time for Visit, including Counseling / Coordination of Care: 45 minutes  Greater than 50% of this total time spent on direct patient counseling and coordination of care  Chief Complaint:   Chest pain palpitations dizziness    History of Present Illness:    Tiara Glasgow is a 54 y o  female who presents with PMH of AFib, history V-tach status post AICD, hypertrophic cardiomyopathy, tobacco abuse, hypertension, GERD coming the hospital for chest pain palpitations and dizziness  Patient reports she was in her normal state of health she recently traveled to [address] [place] [address] and returned the day prior to admission  She notes that she had acute onset chest pain which was substernal nonradiating described as heaviness    This lasted hours in duration and was not waxing waning but constant  Improved w/inspiration  It was associated with palpitations and dizziness while the patient was in the grocery store on the morning of admission  The patient got into a motorized cart drove home and then called her significant other who brought her to the ED for evaluation  As well that she was in AFib in RVR on admission she was given 2 doses of Cardizem IV and started on Cardizem drip  Of note she does have noncompliance with his relative node for the last 2 months as she has been having difficulty getting transportation to her cardiologist who did not give her any refills until she was seen  She notes despite this, that she has still been taking her Toprol which he believes extended release formulation 75 mg twice daily as well as daily Lasix  She denies any shortness of breath she denies any new cough  She has no orthopnea or PND or lower extremity edema  She has no calf pain or tenderness  She has no fevers or chills  Still smokes but declines nicotine patch  No alcohol or drug use  Review of Systems:    Review of Systems   Constitutional: Negative for appetite change, chills and fever  Respiratory: Negative for shortness of breath  Cardiovascular: Positive for chest pain and palpitations  Negative for leg swelling  Gastrointestinal: Negative for abdominal pain, diarrhea, nausea and vomiting  Neurological: Positive for light-headedness and headaches  All other systems reviewed and are negative        Past Medical and Surgical History:     Past Medical History:   Diagnosis Date    Arthritis     Atrial fibrillation ([address] )     Atrial fibrillation (HCC)     Breast lump     GERD (gastroesophageal reflux disease)     H/O transfusion 1987    Hepatitis C     resolved    Hepatitis C     Hyperlipidemia     Hypertension     Irregular heart beat     Pacemaker     Sleep apnea     no cpap       Past Surgical History:   Procedure Laterality Date    CARDIAC CATHETERIZATION 01/07/2019    CARDIAC DEFIBRILLATOR PLACEMENT      CARDIAC PACEMAKER PLACEMENT  2016    AFIB     CHOLECYSTECTOMY      COLONOSCOPY      ELBOW SURGERY      HYSTERECTOMY      HYSTERECTOMY      JOINT REPLACEMENT Left 2015    TKR    KNEE SURGERY Left     AR ESOPHAGOGASTRODUODENOSCOPY TRANSORAL DIAGNOSTIC N/A 5/2/2018    Procedure: ESOPHAGOGASTRODUODENOSCOPY (EGD); Surgeon: Justine Castillo MD;  Location: [facility]; Service: Gastroenterology    AR LARYNGOSCOPY,DIRCT,OP [facility] TUMR N/A 8/10/2018    Procedure: MICRO DIRECT LARYNGOSCOPY , EXCISION OF POLYPS, KTP LASER;  Surgeon: Prudence Liao MD;  Location[facility];  Service: ENT    REPLACEMENT TOTAL KNEE Left     THROAT SURGERY      polyps removed       Meds/Allergies:    Prior to Admission medications    Medication Sig Start Date End Date Taking? Authorizing Provider   amLODIPine (NORVASC) 10 mg tablet 1/2 tab daily 9/25/19  Yes CHARLOTTE Castaneda   cyclobenzaprine (FLEXERIL) 5 mg tablet cyclobenzaprine 5 mg tablet   Yes Historical Provider, MD   famotidine (PEPCID) 20 mg tablet Take 20 mg by mouth daily   Yes Historical Provider, MD   furosemide (LASIX) 20 mg tablet Take 1 tablet (20 mg total) by mouth daily as needed (leg swelling) Do not take more than 3 times a week for now   10/10/19  Yes Lawerence Saltness, DO   furosemide (LASIX) 20 mg tablet take 1 tablet by mouth daily 10/10/19  Yes Crow Stephens MD   metoprolol succinate (TOPROL-XL) 50 mg 24 hr tablet Take one and 1/2 tabs  Twice a day ( 75 mg BID) 3/3/20  Yes Laith Greenfield MD   nicotine (NICODERM CQ) 14 mg/24hr TD 24 hr patch Place 1 patch on the skin daily 9/11/19  Yes Toan Ann MD   pantoprazole (PROTONIX) 40 mg tablet take 1 tablet by mouth daily 30 MINUTES BEFORE BREAKFAST 8/12/19  Yes Prudence Liao MD   rivaroxaban (XARELTO) 20 mg tablet Take 20 mg by mouth daily with breakfast   Yes Historical Provider, MD   traMADol (ULTRAM) 50 mg tablet Take 50 mg by mouth every 6 (six) hours as needed for moderate pain   Yes Historical Provider, MD     I have reviewed home medications with patient personally  Allergies: Allergies   Allergen Reactions    Iodinated Diagnostic Agents Hives    Tape  [Medical Tape] Hives       Social History:     Marital Status: /Civil Union   Occupation:   Patient Pre-hospital Living Situation:   Patient Pre-hospital Level of Mobility:   Patient Pre-hospital Diet Restrictions:   Substance Use History:   Social History     Substance and Sexual Activity   Alcohol Use No     Social History     Tobacco Use   Smoking Status Current Every Day Smoker    Packs/day: 0 50    Years: 0 00    Pack years: 0 00    Types: Cigarettes   Smokeless Tobacco Never Used   Tobacco Comment    about 3 daily     Social History     Substance and Sexual Activity   Drug Use No       Family History:    Family History   Problem Relation Age of Onset    Arthritis Family     Cancer Family     Diabetes Family     Hypertension Family        Physical Exam:     Vitals:   Blood Pressure: (!) 152/115 (03/09/20 1530)  Pulse: (!) 130 (03/09/20 1530)  Temperature: 97 8 °F (36 6 °C) (03/09/20 1335)  Temp Source: Oral (03/09/20 1335)  Respirations: 18 (03/09/20 1448)  Weight - Scale: 99 8 kg (220 lb) (03/09/20 1335)  SpO2: 95 % (03/09/20 1448)    Physical Exam   Constitutional: She appears well-developed  No distress  HENT:   Head: Normocephalic and atraumatic  Right Ear: External ear normal    Left Ear: External ear normal    Eyes: Conjunctivae are normal    Neck: Normal range of motion  Cardiovascular: Exam reveals no gallop and no friction rub  No murmur heard  Irregularly irregular  tachycardiac   Pulmonary/Chest: Effort normal  No respiratory distress  She has no wheezes  She has no rales  Abdominal: Soft  She exhibits no distension and no mass  There is no tenderness  There is no rebound and no guarding  Musculoskeletal: She exhibits no edema     Neurological: She is alert  Skin: Skin is warm and dry  She is not diaphoretic  Psychiatric: She has a normal mood and affect  Vitals reviewed  (  Be Sure to Include Physical Exam: Delete this entire line when you have entered your exam)    Additional Data:     Lab Results: I have personally reviewed pertinent reports  Results from last 7 days   Lab Units 03/09/20  1357   WBC Thousand/uL 6 96   HEMOGLOBIN g/dL 13 9   HEMATOCRIT % 43 8   PLATELETS Thousands/uL 174   NEUTROS PCT % 66   LYMPHS PCT % 24   MONOS PCT % 7   EOS PCT % 3     Results from last 7 days   Lab Units 03/09/20  1357   SODIUM mmol/L 140   POTASSIUM mmol/L 4 1   CHLORIDE mmol/L 103   CO2 mmol/L 26   BUN mg/dL 14   CREATININE mg/dL 1 57*   ANION GAP mmol/L 11   CALCIUM mg/dL 9 2   ALBUMIN g/dL 4 3   TOTAL BILIRUBIN mg/dL 0 36   ALK PHOS U/L 90   ALT U/L 30   AST U/L 35   GLUCOSE RANDOM mg/dL 97     Results from last 7 days   Lab Units 03/09/20  1357   INR  1 04                   Imaging: I have personally reviewed pertinent films in PACS  cxr pa/lateral   No vascular congestion or infiltrate noted on lateral view  XR chest 2 views    (Results Pending)       EKG, Pathology, and Other Studies Reviewed on Admission:   · EKG: narrow complex irregular tachycardia  Concerning for a fib w/rvr    Allscripts / Epic Records Reviewed: Yes     ** Please Note: This note has been constructed using a voice recognition system   **

## 2020-03-10 ENCOUNTER — APPOINTMENT (INPATIENT)
Dept: NUCLEAR MEDICINE | Facility: HOSPITAL | Age: 55
DRG: 201 | End: 2020-03-10
Payer: COMMERCIAL

## 2020-03-10 VITALS
WEIGHT: 214.07 LBS | OXYGEN SATURATION: 98 % | TEMPERATURE: 98.7 F | HEIGHT: 67 IN | HEART RATE: 60 BPM | DIASTOLIC BLOOD PRESSURE: 62 MMHG | BODY MASS INDEX: 33.6 KG/M2 | SYSTOLIC BLOOD PRESSURE: 123 MMHG | RESPIRATION RATE: 18 BRPM

## 2020-03-10 LAB
ANION GAP SERPL CALCULATED.3IONS-SCNC: 7 MMOL/L (ref 4–13)
APTT PPP: 106 SECONDS (ref 23–37)
ATRIAL RATE: 110 BPM
BUN SERPL-MCNC: 18 MG/DL (ref 5–25)
CALCIUM SERPL-MCNC: 8.7 MG/DL (ref 8.3–10.1)
CHLORIDE SERPL-SCNC: 107 MMOL/L (ref 100–108)
CHOLEST SERPL-MCNC: 113 MG/DL (ref 50–200)
CO2 SERPL-SCNC: 26 MMOL/L (ref 21–32)
CREAT SERPL-MCNC: 1.44 MG/DL (ref 0.6–1.3)
EST. AVERAGE GLUCOSE BLD GHB EST-MCNC: 105 MG/DL
GFR SERPL CREATININE-BSD FRML MDRD: 41 ML/MIN/1.73SQ M
GLUCOSE SERPL-MCNC: 98 MG/DL (ref 65–140)
HBA1C MFR BLD: 5.3 %
HDLC SERPL-MCNC: 39 MG/DL
LDLC SERPL CALC-MCNC: 64 MG/DL (ref 0–100)
MAGNESIUM SERPL-MCNC: 2.3 MG/DL (ref 1.6–2.6)
POTASSIUM SERPL-SCNC: 4.4 MMOL/L (ref 3.5–5.3)
QRS AXIS: -50 DEGREES
QRSD INTERVAL: 144 MS
QT INTERVAL: 380 MS
QTC INTERVAL: 518 MS
SODIUM SERPL-SCNC: 140 MMOL/L (ref 136–145)
T WAVE AXIS: 118 DEGREES
TRIGL SERPL-MCNC: 52 MG/DL
TROPONIN I SERPL-MCNC: 0.16 NG/ML
TROPONIN I SERPL-MCNC: 0.18 NG/ML
VENTRICULAR RATE: 112 BPM

## 2020-03-10 PROCEDURE — 99255 IP/OBS CONSLTJ NEW/EST HI 80: CPT | Performed by: INTERNAL MEDICINE

## 2020-03-10 PROCEDURE — 93010 ELECTROCARDIOGRAM REPORT: CPT | Performed by: INTERNAL MEDICINE

## 2020-03-10 PROCEDURE — A9540 TC99M MAA: HCPCS

## 2020-03-10 PROCEDURE — 83036 HEMOGLOBIN GLYCOSYLATED A1C: CPT | Performed by: PHYSICIAN ASSISTANT

## 2020-03-10 PROCEDURE — 93970 EXTREMITY STUDY: CPT | Performed by: SURGERY

## 2020-03-10 PROCEDURE — 80048 BASIC METABOLIC PNL TOTAL CA: CPT | Performed by: PHYSICIAN ASSISTANT

## 2020-03-10 PROCEDURE — 80061 LIPID PANEL: CPT | Performed by: PHYSICIAN ASSISTANT

## 2020-03-10 PROCEDURE — 84484 ASSAY OF TROPONIN QUANT: CPT | Performed by: INTERNAL MEDICINE

## 2020-03-10 PROCEDURE — 85730 THROMBOPLASTIN TIME PARTIAL: CPT | Performed by: INTERNAL MEDICINE

## 2020-03-10 PROCEDURE — 83735 ASSAY OF MAGNESIUM: CPT | Performed by: PHYSICIAN ASSISTANT

## 2020-03-10 PROCEDURE — 99238 HOSP IP/OBS DSCHRG MGMT 30/<: CPT | Performed by: NURSE PRACTITIONER

## 2020-03-10 PROCEDURE — 78582 LUNG VENTILAT&PERFUS IMAGING: CPT

## 2020-03-10 PROCEDURE — A9558 XE133 XENON 10MCI: HCPCS

## 2020-03-10 RX ORDER — METOPROLOL TARTRATE 100 MG/1
100 TABLET ORAL EVERY 12 HOURS SCHEDULED
Qty: 60 TABLET | Refills: 0 | Status: SHIPPED | OUTPATIENT
Start: 2020-03-10 | End: 2020-05-07

## 2020-03-10 RX ORDER — TRAMADOL HYDROCHLORIDE 50 MG/1
50 TABLET ORAL EVERY 6 HOURS PRN
Status: DISCONTINUED | OUTPATIENT
Start: 2020-03-10 | End: 2020-03-10 | Stop reason: HOSPADM

## 2020-03-10 RX ORDER — METOPROLOL SUCCINATE 100 MG/1
100 TABLET, EXTENDED RELEASE ORAL 2 TIMES DAILY
Status: DISCONTINUED | OUTPATIENT
Start: 2020-03-10 | End: 2020-03-10 | Stop reason: HOSPADM

## 2020-03-10 RX ORDER — METOPROLOL SUCCINATE 25 MG/1
25 TABLET, EXTENDED RELEASE ORAL ONCE
Status: DISCONTINUED | OUTPATIENT
Start: 2020-03-10 | End: 2020-03-10 | Stop reason: HOSPADM

## 2020-03-10 RX ADMIN — NICOTINE 1 PATCH: 14 PATCH TRANSDERMAL at 08:36

## 2020-03-10 RX ADMIN — FAMOTIDINE 20 MG: 20 TABLET ORAL at 08:40

## 2020-03-10 RX ADMIN — METOPROLOL SUCCINATE 75 MG: 25 TABLET, EXTENDED RELEASE ORAL at 08:39

## 2020-03-10 RX ADMIN — HEPARIN SODIUM AND DEXTROSE 18 UNITS/KG/HR: 10000; 5 INJECTION INTRAVENOUS at 05:56

## 2020-03-10 RX ADMIN — PANTOPRAZOLE SODIUM 40 MG: 40 TABLET, DELAYED RELEASE ORAL at 05:58

## 2020-03-10 RX ADMIN — HEPARIN SODIUM 7600 UNITS: 1000 INJECTION INTRAVENOUS; SUBCUTANEOUS at 05:55

## 2020-03-10 RX ADMIN — TRAMADOL HYDROCHLORIDE 50 MG: 50 TABLET, FILM COATED ORAL at 10:00

## 2020-03-10 NOTE — PLAN OF CARE
Problem: PAIN - ADULT  Goal: Verbalizes/displays adequate comfort level or baseline comfort level  Description  Interventions:  - Encourage patient to monitor pain and request assistance  - Assess pain using appropriate pain scale  - Administer analgesics based on type and severity of pain and evaluate response  - Implement non-pharmacological measures as appropriate and evaluate response  - Consider cultural and social influences on pain and pain management  - Notify physician/advanced practitioner if interventions unsuccessful or patient reports new pain  Outcome: Progressing     Problem: INFECTION - ADULT  Goal: Absence or prevention of progression during hospitalization  Description  INTERVENTIONS:  - Assess and monitor for signs and symptoms of infection  - Monitor lab/diagnostic results  - Monitor all insertion sites, i e  indwelling lines, tubes, and drains  - Monitor endotracheal if appropriate and nasal secretions for changes in amount and color  - [place] appropriate cooling/warming therapies per order  - Administer medications as ordered  - Instruct and encourage patient and family to use good hand hygiene technique  - Identify and instruct in appropriate isolation precautions for identified infection/condition  Outcome: Progressing     Problem: SAFETY ADULT  Goal: Patient will remain free of falls  Description  INTERVENTIONS:  - Assess patient frequently for physical needs  -  Identify cognitive and physical deficits and behaviors that affect risk of falls    -  [place] fall precautions as indicated by assessment   - Educate patient/family on patient safety including physical limitations  - Instruct patient to call for assistance with activity based on assessment  - Modify environment to reduce risk of injury  - Consider OT/PT consult to assist with strengthening/mobility  Outcome: Progressing  Goal: Maintain or return to baseline ADL function  Description  INTERVENTIONS:  -  Assess patient's ability to carry out ADLs; assess patient's baseline for ADL function and identify physical deficits which impact ability to perform ADLs (bathing, care of mouth/teeth, toileting, grooming, dressing, etc )  - Assess/evaluate cause of self-care deficits   - Assess range of motion  - Assess patient's mobility; develop plan if impaired  - Assess patient's need for assistive devices and provide as appropriate  - Encourage maximum independence but intervene and supervise when necessary  - Involve family in performance of ADLs  - Assess for home care needs following discharge   - Consider OT consult to assist with ADL evaluation and planning for discharge  - Provide patient education as appropriate  Outcome: Progressing  Goal: Maintain or return mobility status to optimal level  Description  INTERVENTIONS:  - Assess patient's baseline mobility status (ambulation, transfers, stairs, etc )    - Identify cognitive and physical deficits and behaviors that affect mobility  - Identify mobility aids required to assist with transfers and/or ambulation (gait belt, sit-to-stand, lift, walker, cane, etc )  - [place] fall precautions as indicated by assessment  - Record patient progress and toleration of activity level on Mobility SBAR; progress patient to next Phase/Stage  - Instruct patient to call for assistance with activity based on assessment  - Consider rehabilitation consult to assist with strengthening/weightbearing, etc   Outcome: Progressing     Problem: DISCHARGE PLANNING  Goal: Discharge to home or other facility with appropriate resources  Description  INTERVENTIONS:  - Identify barriers to discharge w/patient and caregiver  - Arrange for needed discharge resources and transportation as appropriate  - Identify discharge learning needs (meds, wound care, etc )  - Arrange for interpretive services to assist at discharge as needed  - Refer to Case Management Department for coordinating discharge planning if the patient needs post-hospital services based on physician/advanced practitioner order or complex needs related to functional status, cognitive ability, or social support system  Outcome: Progressing     Problem: CARDIOVASCULAR - ADULT  Goal: Maintains optimal cardiac output and hemodynamic stability  Description  INTERVENTIONS:  - Monitor I/O, vital signs and rhythm  - Monitor for S/S and trends of decreased cardiac output  - Administer and titrate ordered vasoactive medications to optimize hemodynamic stability  - Assess quality of pulses, skin color and temperature  - Assess for signs of decreased coronary artery perfusion  - Instruct patient to report change in severity of symptoms  Outcome: Progressing  Goal: Absence of cardiac dysrhythmias or at baseline rhythm  Description  INTERVENTIONS:  - Continuous cardiac monitoring, vital signs, obtain 12 lead EKG if ordered  - Administer antiarrhythmic and heart rate control medications as ordered  - Monitor electrolytes and administer replacement therapy as ordered  Outcome: Progressing     Problem: GASTROINTESTINAL - ADULT  Goal: Minimal or absence of nausea and/or vomiting  Description  INTERVENTIONS:  - Administer IV fluids if ordered to ensure adequate hydration  - Maintain NPO status until nausea and vomiting are resolved  - Nasogastric tube if ordered  - Administer ordered antiemetic medications as needed  - Provide nonpharmacologic comfort measures as appropriate  - Advance diet as tolerated, if ordered  - Consider nutrition services referral to assist patient with adequate nutrition and appropriate food choices  Outcome: Progressing     Problem: METABOLIC, FLUID AND ELECTROLYTES - ADULT  Goal: Electrolytes maintained within normal limits  Description  INTERVENTIONS:  - Monitor labs and assess patient for signs and symptoms of electrolyte imbalances  - Administer electrolyte replacement as ordered  - Monitor response to electrolyte replacements, including repeat lab results as appropriate  - Instruct patient on fluid and nutrition as appropriate  Outcome: Progressing  Goal: Fluid balance maintained  Description  INTERVENTIONS:  - Monitor labs   - Monitor I/O and WT  - Instruct patient on fluid and nutrition as appropriate  - Assess for signs & symptoms of volume excess or deficit  Outcome: Progressing     Problem: SKIN/TISSUE INTEGRITY - ADULT  Goal: Skin integrity remains intact  Description  INTERVENTIONS  - Identify patients at risk for skin breakdown  - Assess and monitor skin integrity  - Assess and monitor nutrition and hydration status  - Monitor labs (i e  albumin)  - Assess for incontinence   - Turn and reposition patient  - Assist with mobility/ambulation  - Relieve pressure over bony prominences  - Avoid friction and shearing  - Provide appropriate hygiene as needed including keeping skin clean and dry  - Evaluate need for skin moisturizer/barrier cream  - Collaborate with interdisciplinary team (i e  Nutrition, Rehabilitation, etc )   - Patient/family teaching  Outcome: Progressing     Problem: MUSCULOSKELETAL - ADULT  Goal: Maintain or return mobility to safest level of function  Description  INTERVENTIONS:  - Assess patient's ability to carry out ADLs; assess patient's baseline for ADL function and identify physical deficits which impact ability to perform ADLs (bathing, care of mouth/teeth, toileting, grooming, dressing, etc )  - Assess/evaluate cause of self-care deficits   - Assess range of motion  - Assess patient's mobility  - Assess patient's need for assistive devices and provide as appropriate  - Encourage maximum independence but intervene and supervise when necessary  - Involve family in performance of ADLs  - Assess for home care needs following discharge   - Consider OT consult to assist with ADL evaluation and planning for discharge  - Provide patient education as appropriate  Outcome: Progressing

## 2020-03-10 NOTE — ASSESSMENT & PLAN NOTE
· AFib with RVR on admission converted to sinus rhythm  · Has history of dual chamber [employer] ICD  · Patient admits to non adherence with her medication regimen specifically stating she was not taking the Xarelto for several weeks  · Was placed on Cardizem on admission but this has been discontinued, at this time she will continue to take Toprol 75 mg twice a day    · [address] cardiology consultation, no further workup this hospitalization  · She will follow-up with her established cardiologist after discharge   · D-dimer is elevated therefore V/Q scan completed

## 2020-03-10 NOTE — DISCHARGE SUMMARY
Discharge- Bruce Haskinsford 1965, 54 y o  female MRN: [id number]    Unit/Bed#: S -01 Encounter: [id number]    Primary Care Provider: Bryce Simpson MD   Date and time admitted to hospital: 3/9/2020  1:43 PM        Chronic diastolic CHF (congestive heart failure) ([facility] [address] )  Assessment & Plan  Wt Readings from Last 3 Encounters:   03/10/20 97 1 kg (214 lb 1 1 oz)   12/23/19 97 4 kg (214 lb 11 6 oz)   12/15/19 102 kg (223 lb 12 3 oz)     · Without acute exacerbation  · Chest x-ray with mild central pulmonary vascular congestion  · History of left heart catheterization in 2019 with minor luminal irregularities  · Echocardiogram completed in April of 2019 with normal EF  No repeat an echocardiogram recommended at this time by Cardiology  · Continue current medication regimen and advised daily weights    Stage 3 chronic kidney disease ([facility] [address] )  Assessment & Plan  · Baseline creatinine 1 5-2  · Patient currently slightly below baseline  · Avoid nephrotoxins, avoid hypotension    Lightheadedness  Assessment & Plan  · Likely secondary to AFib with RVR    No LOC or other focal neuro deficits  · Symptoms have resolved    Elevated troponin  Assessment & Plan  · Non MI troponin elevation, suspect elevated troponin in the setting of acute kidney injury, atrial fibrillation with RVR, tachycardia, hypertrophic cardiomyopathy, cocaine found in UDS (patient denying use)    Essential hypertension  Assessment & Plan  Hold norvasc while on IV cardizem  Continue toprol and lasix and IV cardizem gtt  Continue to monitor    Chest pain  Assessment & Plan  · Atypical chest pain this is likely in the setting of AFib RVR  · No reported episodes of per patient since admission or noted with interrogation of AICD in ED  · Troponin is mildly elevated in the setting of acute kidney injury,  tachycardia, hypertrophic cardiomyopathy and findings of cocaine and opiate use on her UDS (patient denying use)  · D-dimer elevated, venous thrombus prophylaxis scan completed: ________________________  · Currently in sinus rhythm on telemetry  · Patient admits that she was been off her Xarelto secondary to not having a current prescription, states she was taking aspirin though  · Chest x-ray mild central pulmonary vascular congestion  · Cardiology advising that she follow-up with her established cardiologist as an outpatient  * Atrial fibrillation ([address] )  Assessment & Plan  · AFib with RVR on admission converted to sinus rhythm  · Has history of dual chamber [employer] ICD  · Patient admits to non adherence with her medication regimen specifically stating she was not taking the Xarelto for several weeks  · Was placed on Cardizem on admission but this has been discontinued, at this time she will continue to take Toprol 75 mg twice a day  · [address] cardiology consultation, no further workup this hospitalization  · She will follow-up with her established cardiologist after discharge   · D-dimer is elevated therefore V/Q scan completed          Discharging Physician / Practitioner: CHARLOTTE Rice  PCP: Skye Ross MD  Admission Date:   Admission Orders (From admission, onward)     Ordered        03/09/20 1526  Inpatient Admission (expected length of stay for this patient Order details is greater than two midnights)  Once                   Discharge Date: 03/10/20    Resolved Problems  Date Reviewed: 3/10/2020    None          Consultations During Hospital Stay:  · cardiology    Procedures Performed:   · VQ scan low prob PE  · CXR: mild central pulm vascular congestion    Significant Findings / Test Results:   Xr Chest 2 Views    Result Date: 3/10/2020  Narrative: CHEST INDICATION:   dyspnea  COMPARISON:  One view chest 10/9/2019 EXAM PERFORMED/VIEWS:  XR CHEST PA & LATERAL FINDINGS: Left chest wall cardiac pacer/defibrillator device in place  Cardiac silhouette mildly enlarged  Prominent central pulmonary vascular fracture   No airspace consolidation, pneumothorax, or pleural effusion  Surgical clips right upper quadrant of the abdomen attributed to prior cholecystectomy  Osseous structures appear within normal limits for patient age  Impression: Findings suggestive of mild central pulmonary vascular congestion  Otherwise, no radiographic evidence of acute intrathoracic process  Workstation performed: [id number]     Nm Lung Ventilation / Perfusion    Result Date: 3/10/2020  Narrative: VENTILATION AND PERFUSION SCAN INDICATION: Dyspnea on exertion COMPARISON:  Chest radiograph  3/9/2020 TECHNIQUE:  Posterior ventilation imaging was performed after the inhalation of 11 57 mCi Xe-133 gas  Multiplanar perfusion imaging was next performed following the intravenous administration of 3 93 mCi Tc-99m labeled MAA  FINDINGS:  Ventilation imaging demonstrates a homogeneous distribution of radiopharmaceutical throughout both lungs on the urogram phase, with patchy air trapping bilaterally on the washout phase  Perfusion imaging demonstrates a heterogeneous distribution of the radiopharmaceutical throughout both lungs, predominantly on the left, but matching the ventilation pattern  There is no significant  segmental or subsegmental mismatched defect  Impression: The probability for pulmonary embolus is low  Workstation performed: [id number]     Vas Lower Limb Venous Duplex Study, Complete Bilateral  Result Date: 3/10/2020  · Narrative:  [facility] REPORT CLINICAL: Indications: The patient was recently on a 9 hour car ride and is experiencing A-fib and chest pain  Operative History: Cholecystectomy Hysterectomy Pacemaker Risk Factors The patient has history of HTN, HLD and CKD  She has no history of DVT or Recent Trauma    FINDINGS:  Segment  Right            Left              Impression       Impression       CFV      Normal (Patent)  Normal (Patent)     CONCLUSION:  Impression: RIGHT LOWER LIMB: No evidence of acute or chronic deep vein thrombosis  No evidence of superficial thrombophlebitis noted  Doppler evaluation shows a normal response to augmentation maneuvers  Popliteal, posterior tibial and anterior tibial arterial Doppler waveforms are triphasic  LEFT LOWER LIMB: No evidence of acute or chronic deep vein thrombosis  No evidence of superficial thrombophlebitis noted  Doppler evaluation shows a normal response to augmentation maneuvers  Popliteal, posterior tibial and anterior tibial arterial Doppler waveforms are triphasic  SIGNATURE: Electronically Signed by: Tejas Dawson on 2020-03-10 07:05:29 AM    Incidental Findings:   ·      Test Results Pending at Discharge (will require follow up):   ·      Outpatient Tests Requested:  ·     Complications:  none    Reason for Admission: chest pain, palpitations, dizziness    Hospital Course:     Tanja Simon is a 54 y o  female patient with known hx of afib/flutter, s/p dual chamber ICD, ventricular tach and hypertrophic cardiomyopathy who originally presented to the hospital on 3/9/2020 due to CP dizziness and palpitations  Found to be in Afib with RVR  Placed on cardizem infusion and converted to NSR  Metoprolol was increased to 10 mg BID  She admitted that sh was not taking her Xarelto for the past severeal weeka but stated she was taking ASA  D Dimer was elevated and VQ scan was completed which showed low prob PE  UDS completed showed cocaine and opiates in urine  Patient denies any active drug use  Troponins were slightly elevated but thought to be non mi elevation in the setting of DANIELA, cocaine use, and afib with RVR  Please see above list of diagnoses and related plan for additional information  Condition at Discharge: stable     Discharge Day Visit / Exam:     Subjective:  Denies CP, no SOB  Wants to go home   Denies N/V  enedina diet  Vitals: Blood Pressure: 123/62 (03/10/20 0700)  Pulse: 60 (03/10/20 0700)  Temperature: 98 7 °F (37 1 °C) (03/10/20 0700)  Temp Source: Oral (03/10/20 0700)  Respirations: 18 (03/10/20 0700)  Height: 5' 7" (170 2 cm) (03/09/20 1638)  Weight - Scale: 97 1 kg (214 lb 1 1 oz) (03/10/20 0600)  SpO2: 98 % (03/10/20 0700)  Exam:   Physical Exam   Constitutional: She is oriented to person, place, and time  She appears well-developed and well-nourished  No distress  HENT:   Head: Normocephalic and atraumatic  Mouth/Throat: Oropharynx is clear and moist    Neck: Neck supple  Cardiovascular: Normal rate and regular rhythm  Pulmonary/Chest: Effort normal and breath sounds normal  No respiratory distress  Abdominal: Soft  Bowel sounds are normal  She exhibits no distension  There is no tenderness  Musculoskeletal: Normal range of motion  She exhibits no edema  Neurological: She is alert and oriented to person, place, and time  No cranial nerve deficit  Skin: Skin is warm and dry  Capillary refill takes less than 2 seconds  Psychiatric: She has a normal mood and affect  Her behavior is normal    Vitals reviewed  Discussion with Family: family at bedside    Discharge instructions/Information to patient and family:   See after visit summary for information provided to patient and family  Provisions for Follow-Up Care:  See after visit summary for information related to follow-up care and any pertinent home health orders  Disposition:     Home    For Discharges to [facility]:   · Not Applicable to this Patient - Not Applicable to this Patient    Planned Readmission: no     Discharge Statement:  I spent 40 minutes discharging the patient  This time was spent on the day of discharge  I had direct contact with the patient on the day of discharge  Greater than 50% of the total time was spent examining patient, answering all patient questions, arranging and discussing plan of care with patient as well as directly providing post-discharge instructions    Additional time then spent on discharge activities  Discharge Medications:  See after visit summary for reconciled discharge medications provided to patient and family        ** Please Note: This note has been constructed using a voice recognition system **

## 2020-03-10 NOTE — CONSULTS
Consultation - Cardiology   Neville Mccarty 54 y o  female MRN: [id number]  Unit/Bed#: S -01 Encounter: [id number]    Assessment/Plan     Principal Problem:    Atrial fibrillation [facility])  Active Problems:    Chest pain    Essential hypertension    History of ventricular tachycardia (HCC) with ICD    Hypertrophic cardiomyopathy (HCC)    Elevated troponin    Lightheadedness    Stage 3 chronic kidney disease (HCC)    Chronic diastolic CHF (congestive heart failure) ([facility])        Assessment/Plan    1  Atrial fibrillation/flutter with RVR- PAF  Converted to sinus rhythm  Associated with chest discomfort which is now resolved with conversion to sinus rhythm  Currently on IV Cardizem, will d/c  Toprol 75 b i d -reportedly has not missed doses  Check ICD interrogation for AFib burden  Patient has not been taking his Xarelto because she ran out  Resume  UDS + cocaine  Pt denies recent cocaine use  2  Elevated troponin-setting of tachycardia/hypertrophic cardiomyopathy/cocaine  Non MI troponin elevation  Left heart catheterization 01/2019-minor luminal irregularities    3  Hypertrophic cardiomyopathy  ICD in situ  Continue beta-blockade    4  History of VT status post ICD implantation  Followed [facility]  Interrogation 10/2019-no documented AFib or VT  Interrogation 09/2019 1 AFib episode 9 hours, no VT reported    5  HTN-blood pressure more controlled this morning  Initially 167/88  Amlodipine had been DC as beta-blocker increased 10/2019    6  CKD 3-baseline creatinine 1 5-2  Creatinine initially 1 57 now 1 44    7  Substance abuse -tobacco , UDS + cocaine  Recommend cessation          History of Present Illness   Physician Requesting Consult: Abbi Naranjo MD  Reason for Consult / Principal Problem:  Atrial fibrillation    HPI: Neville Mccarty is a 54y o  year old female with PAF/ [place] Ek /SVT /VT , hypertrophic nonobstructive cardiomyopathy , dual-chamber Medtronic ICD , HTN , CKD 3 , tobacco abuse who presents with palpitations, chest discomfort and dizziness  She was found to be in atrial fibrillation with RVR and given 2 doses of IV Cardizem then an IV Cardizem drip   Resolution of her chest discomfort correlated with the timing conversion to sinus rhythm  She is asking to go home  Denies other palpitations in the last 6 months  UDS + cocaine/ opiates  Chest x-ray-mild central pulmonary vascular congestion  Venous duplex no acute or chronic DVT bilaterally  Troponin 0 18  Creatinine 1 57  UDS + cocaine  She had a prior left heart catheterization 01/2019 with minor luminal irregularities  2D echo 04/2019 EF normal   Septal wall thickness markedly increased  No dynamic obstruction  Grade 1 diastolic dysfunction  She is followed by Dr Pratima Caruso  She was last seen in the office by Mery Yeager 09/2019  Toprol was increased to 75 every 12 hours  She was noted to have 9 hours of AFib several days prior  She had hospitalization 12/2018 for AFib with RVR  At that time her metoprolol dose was increased  Inpatient consult to Cardiology  Consult performed by: CHARLOTTE Redmond  Consult ordered by: Juan Pablo Epstein PA-C          Review of Systems   Constitutional: Negative  HENT: Negative  Eyes: Negative  Respiratory: Negative  Cardiovascular: Positive for chest pain  Negative for palpitations  Gastrointestinal: Negative  Endocrine: Negative  Genitourinary: Negative  Musculoskeletal: Negative  Skin: Negative  Allergic/Immunologic: Negative  Neurological: Positive for dizziness  Hematological: Negative  Psychiatric/Behavioral: Negative  All other systems reviewed and are negative        Historical Information   Past Medical History:   Diagnosis Date    Arthritis     Atrial fibrillation ([facility] [address] )     Atrial fibrillation (HCC)     Breast lump     GERD (gastroesophageal reflux disease)     H/O transfusion 1987    Hepatitis C     resolved  Hepatitis C     Hyperlipidemia     Hypertension     Irregular heart beat     Pacemaker     Sleep apnea     no cpap     Past Surgical History:   Procedure Laterality Date    CARDIAC CATHETERIZATION  01/07/2019    CARDIAC DEFIBRILLATOR PLACEMENT      CARDIAC PACEMAKER PLACEMENT  2016    AFIB     CHOLECYSTECTOMY      COLONOSCOPY      ELBOW SURGERY      HYSTERECTOMY      HYSTERECTOMY      JOINT REPLACEMENT Left 2015    TKR    KNEE SURGERY Left     OR ESOPHAGOGASTRODUODENOSCOPY TRANSORAL DIAGNOSTIC N/A 5/2/2018    Procedure: ESOPHAGOGASTRODUODENOSCOPY (EGD); Surgeon: Meghana Aldana MD;  Location: [facility];   Service: Gastroenterology    OR LARYNGOSCOPY,DIRCT,OP [facility] TUMR N/A 8/10/2018    Procedure: MICRO DIRECT LARYNGOSCOPY , EXCISION OF POLYPS, KTP LASER;  Surgeon: Lyle Roberts MD;  Location: [facility];  Service: ENT    REPLACEMENT TOTAL KNEE Left     THROAT SURGERY      polyps removed     Social History     Substance and Sexual Activity   Alcohol Use No     Social History     Substance and Sexual Activity   Drug Use No     Social History     Tobacco Use   Smoking Status Current Every Day Smoker    Packs/day: 0 50    Years: 0 00    Pack years: 0 00    Types: Cigarettes   Smokeless Tobacco Never Used   Tobacco Comment    about 3 daily     Family History:   Family History   Problem Relation Age of Onset    Arthritis Family     Cancer Family     Diabetes Family     Hypertension Family        Meds/Allergies   current meds:   Current Facility-Administered Medications   Medication Dose Route Frequency    diltiazem (CARDIZEM) 125 mg in sodium chloride 0 9 % 125 mL infusion  1-15 mg/hr Intravenous Titrated    famotidine (PEPCID) tablet 20 mg  20 mg Oral Daily    heparin (porcine) 25,000 units in 250 mL infusion (premix)  3-30 Units/kg/hr (Order-Specific) Intravenous Titrated    heparin (porcine) injection 3,800 Units  3,800 Units Intravenous PRN    heparin (porcine) injection 7,600 Units  7,600 Units Intravenous PRN    metoprolol succinate (TOPROL-XL) 24 hr tablet 75 mg  75 mg Oral BID    morphine injection 2 mg  2 mg Intravenous Q4H PRN    nicotine (NICODERM CQ) 14 mg/24hr TD 24 hr patch 1 patch  1 patch Transdermal Daily    ondansetron (ZOFRAN) injection 4 mg  4 mg Intravenous Q6H PRN    pantoprazole (PROTONIX) EC tablet 40 mg  40 mg Oral Daily Before Breakfast    traMADol (ULTRAM) tablet 50 mg  50 mg Oral Q6H PRN    and PTA meds:    Medications Prior to Admission   Medication    amLODIPine (NORVASC) 10 mg tablet    cyclobenzaprine (FLEXERIL) 5 mg tablet    famotidine (PEPCID) 20 mg tablet    furosemide (LASIX) 20 mg tablet    furosemide (LASIX) 20 mg tablet    metoprolol succinate (TOPROL-XL) 50 mg 24 hr tablet    nicotine (NICODERM CQ) 14 mg/24hr TD 24 hr patch    pantoprazole (PROTONIX) 40 mg tablet    rivaroxaban (XARELTO) 20 mg tablet    traMADol (ULTRAM) 50 mg tablet     Allergies   Allergen Reactions    Iodinated Diagnostic Agents Hives    Tape  [Medical Tape] Hives       Objective   Vitals: Blood pressure 123/62, pulse 60, temperature 98 7 °F (37 1 °C), temperature source Oral, resp  rate 18, height 5' 7" (1 702 m), weight 97 1 kg (214 lb 1 1 oz), SpO2 98 %, not currently breastfeeding    Orthostatic Blood Pressures      Most Recent Value   Blood Pressure  123/62 filed at 03/10/2020 0700   Patient Position - Orthostatic VS  Lying filed at 03/10/2020 0700            Intake/Output Summary (Last 24 hours) at 3/10/2020 0908  Last data filed at 3/9/2020 1827  Gross per 24 hour   Intake 240 ml   Output    Net 240 ml       Invasive Devices     Peripheral Intravenous Line            Peripheral IV 03/09/20 Right Antecubital less than 1 day                Physical Exam: /62 (BP Location: Left arm)   Pulse 60   Temp 98 7 °F (37 1 °C) (Oral)   Resp 18   Ht 5' 7" (1 702 m)   Wt 97 1 kg (214 lb 1 1 oz)   SpO2 98%   BMI 33 53 kg/m²   General Appearance: Alert, cooperative, no distress, appears stated age   Head:    Normocephalic, no scleral icterus   Eyes:    PERRL   Nose:   Nares normal, septum midline, mucosa normal, no drainage    Throat:   Lips, mucosa, and tongue normal   Neck:   Supple, symmetrical, trachea midline     no JVD   Back:     Symmetric   Lungs:     Clear to auscultation bilaterally, respirations unlabored   Chest Wall:    No tenderness or deformity    Heart:    Regular rate and rhythm, S1 and S2 normal, no murmur, rub   or gallop   Abdomen:     Soft, non-tender, bowel sounds active all four quadrants,     no masses, no organomegaly   Extremities:   Extremities normal, atraumatic, no cyanosis or edema   Pulses:   2+ and symmetric all extremities   Skin:   Skin color, texture, turgor normal, no rashes or lesions   Neurologic:   Alert and oriented to person place and time   No focal deficits       Lab Results:   Recent Results (from the past 72 hour(s))   ECG 12 lead    Collection Time: 03/09/20  1:49 PM   Result Value Ref Range    Ventricular Rate 147 BPM    Atrial Rate 147 BPM    WV Interval 192 ms    QRSD Interval 138 ms    QT Interval 348 ms    QTC Interval 544 ms    P Axis 0 degrees    QRS Axis -47 degrees    T Wave Axis 102 degrees   CBC and differential    Collection Time: 03/09/20  1:57 PM   Result Value Ref Range    WBC 6 96 4 31 - 10 16 Thousand/uL    RBC 5 15 (H) 3 81 - 5 12 Million/uL    Hemoglobin 13 9 11 5 - 15 4 g/dL    Hematocrit 43 8 34 8 - 46 1 %    MCV 85 82 - 98 fL    MCH 27 0 26 8 - 34 3 pg    MCHC 31 7 31 4 - 37 4 g/dL    RDW 13 8 11 6 - 15 1 %    MPV 10 9 8 9 - 12 7 fL    Platelets 394 313 - 923 Thousands/uL    nRBC 0 /100 WBCs    Neutrophils Relative 66 43 - 75 %    Immat GRANS % 0 0 - 2 %    Lymphocytes Relative 24 14 - 44 %    Monocytes Relative 7 4 - 12 %    Eosinophils Relative 3 0 - 6 %    Basophils Relative 0 0 - 1 %    Neutrophils Absolute 4 61 1 85 - 7 62 Thousands/µL    Immature Grans Absolute 0 01 0 00 - 0 20 Thousand/uL    Lymphocytes Absolute 1 67 0 60 - 4 47 Thousands/µL    Monocytes Absolute 0 47 0 17 - 1 22 Thousand/µL    Eosinophils Absolute 0 18 0 00 - 0 61 Thousand/µL    Basophils Absolute 0 02 0 00 - 0 10 Thousands/µL   Comprehensive metabolic panel    Collection Time: 03/09/20  1:57 PM   Result Value Ref Range    Sodium 140 136 - 145 mmol/L    Potassium 4 1 3 5 - 5 3 mmol/L    Chloride 103 100 - 108 mmol/L    CO2 26 21 - 32 mmol/L    ANION GAP 11 4 - 13 mmol/L    BUN 14 5 - 25 mg/dL    Creatinine 1 57 (H) 0 60 - 1 30 mg/dL    Glucose 97 65 - 140 mg/dL    Calcium 9 2 8 3 - 10 1 mg/dL    AST 35 5 - 45 U/L    ALT 30 12 - 78 U/L    Alkaline Phosphatase 90 46 - 116 U/L    Total Protein 9 1 (H) 6 4 - 8 2 g/dL    Albumin 4 3 3 5 - 5 0 g/dL    Total Bilirubin 0 36 0 20 - 1 00 mg/dL    eGFR 37 ml/min/1 73sq m   Troponin I    Collection Time: 03/09/20  1:57 PM   Result Value Ref Range    Troponin I 0 05 (H) <=0 04 ng/mL   Protime-INR    Collection Time: 03/09/20  1:57 PM   Result Value Ref Range    Protime 13 0 11 6 - 14 5 seconds    INR 1 04 0 84 - 1 19   APTT    Collection Time: 03/09/20  1:57 PM   Result Value Ref Range    PTT 26 23 - 37 seconds   Rapid drug screen, urine    Collection Time: 03/09/20  5:10 PM   Result Value Ref Range    Amph/Meth UR Negative Negative    Barbiturate Ur Negative Negative    Benzodiazepine Urine Negative Negative    Cocaine Urine Positive (A) Negative    Methadone Urine Negative Negative    Opiate Urine Positive (A) Negative    PCP Ur Negative Negative    THC Urine Negative Negative   D-dimer, quantitative    Collection Time: 03/09/20  5:12 PM   Result Value Ref Range    D-Dimer, Quant 1 42 (H) <0 50 ug/ml FEU   Troponin I    Collection Time: 03/09/20  5:12 PM   Result Value Ref Range    Troponin I 0 10 (H) <=0 04 ng/mL   Troponin I    Collection Time: 03/09/20  8:42 PM   Result Value Ref Range    Troponin I 0 17 (H) <=0 04 ng/mL   CBC    Collection Time: 03/09/20  8:42 PM   Result Value Ref Range    WBC 5 55 4 31 - 10 16 Thousand/uL    RBC 4 45 3 81 - 5 12 Million/uL    Hemoglobin 12 2 11 5 - 15 4 g/dL    Hematocrit 37 7 34 8 - 46 1 %    MCV 85 82 - 98 fL    MCH 27 4 26 8 - 34 3 pg    MCHC 32 4 31 4 - 37 4 g/dL    RDW 13 8 11 6 - 15 1 %    Platelets 690 (L) 205 - 390 Thousands/uL    MPV 10 9 8 9 - 12 7 fL   Troponin I    Collection Time: 20 11:28 PM   Result Value Ref Range    Troponin I 0 17 (H) <=0 04 ng/mL   Troponin I    Collection Time: 03/10/20  5:15 AM   Result Value Ref Range    Troponin I 0 18 (H) <=0 04 ng/mL   Lipid Panel with Direct LDL reflex    Collection Time: 03/10/20  5:15 AM   Result Value Ref Range    Cholesterol 113 50 - 200 mg/dL    Triglycerides 52 <=150 mg/dL    HDL, Direct 39 (L) >=40 mg/dL    LDL Calculated 64 0 - 100 mg/dL   Basic metabolic panel    Collection Time: 03/10/20  5:15 AM   Result Value Ref Range    Sodium 140 136 - 145 mmol/L    Potassium 4 4 3 5 - 5 3 mmol/L    Chloride 107 100 - 108 mmol/L    CO2 26 21 - 32 mmol/L    ANION GAP 7 4 - 13 mmol/L    BUN 18 5 - 25 mg/dL    Creatinine 1 44 (H) 0 60 - 1 30 mg/dL    Glucose 98 65 - 140 mg/dL    Calcium 8 7 8 3 - 10 1 mg/dL    eGFR 41 ml/min/1 73sq m   Magnesium    Collection Time: 03/10/20  5:15 AM   Result Value Ref Range    Magnesium 2 3 1 6 - 2 6 mg/dL   Troponin I    Collection Time: 03/10/20  8:19 AM   Result Value Ref Range    Troponin I 0 16 (H) <=0 04 ng/mL     [facility] [address]  [phone]     [facility]     Invasive Cardiovascular Lab Complete Report     Patient: Radha King  MR number: [id number]  Account number: [de-identified]  Study date: 2019  Gender: Female  : 1965  Height: 66 9 in  Weight: 272 8 lb  BSA: 2 31 m squared     Allergies: IODINATED DIAGNOSTIC AGENTS, MEDICAL TAPE     Diagnostic Cardiologist:  Anastasia Aase, DO  Primary Physician:  Faye Kidney     INDICATIONS:  --  ventricular tachycardia, elev trop possibly from arrhythmia, r/o cad      PROCEDURES PERFORMED     --  Left heart catheterization without ventriculogram   --  Left coronary angiography  --  Right coronary angiography  --  Left coronary angiography  --  Right coronary angiography  --  Outpatient  --  Mod Sedation Same Physician Initial 15min  --  Mod Sedation Same Physician Add 15min  --  Coronary Catheterization (w/ LHC)     PROCEDURE: The risks and alternatives of the procedures and conscious sedation were explained to the patient and informed consent was obtained  The patient was brought to the cath lab and placed on the table  The planned puncture sites  were prepped and draped in the usual sterile fashion      --  Right radial artery access  After performing an Leighton's test to verify adequate ulnar artery supply to the hand, the radial site was prepped  The puncture site was infiltrated with local anesthetic  The vessel was accessed using the  modified Seldinger technique, a wire was advanced into the vessel, and a sheath was advanced over the wire into the vessel      --  Left heart catheterization without ventriculogram  A catheter was advanced over a guidewire into the ascending aorta  After recording ascending aortic pressure, the catheter was advanced across the aortic valve and left ventricular  pressure was recorded  The catheter was pulled back across the aortic valve and into the ascending aorta and pullback pressures were obtained      --  Left coronary artery angiography  A catheter was advanced over a guidewire into the aorta and positioned in the left coronary artery ostium under fluoroscopic guidance  Angiography was performed      --  Right coronary artery angiography  A catheter was advanced over a guidewire into the aorta and positioned in the right coronary artery ostium under fluoroscopic guidance  Angiography was performed      --  Left coronary artery angiography   A catheter was advanced over a guidewire into the aorta and positioned in the left coronary artery ostium under fluoroscopic guidance  Angiography was performed      --  Right coronary artery angiography  A catheter was advanced over a guidewire into the aorta and positioned in the right coronary artery ostium under fluoroscopic guidance  Angiography was performed      --  Outpatient      --  Mod Sedation Same Physician Initial 15min      --  Mod Sedation Same Physician Add 15min      --  Coronary Catheterization (w/ LHC)     PROCEDURE COMPLETION: The patient tolerated the procedure well and was discharged from the cath lab  TIMING: Test started at 08:25  Test concluded at 08:47  HEMOSTASIS: The sheath was removed  The site was compressed with a Hemoband  device  Hemostasis was obtained  MEDICATIONS GIVEN: Fentanyl (1OOmcg/2 ml), 50 mcg, IV, at 08:33  Versed (2mg/2ml), 2 mg, IV, at 08:33  Fentanyl (1OOmcg/2 ml), 25 mcg, IV, at 08:36  Versed (2mg/2ml), 1 mg, IV, at 08:36  1% Lidocaine, 1 ml,  subcutaneously, at 08:39  Verapamil (5mg/2ml), 2 5 mg, IV, at 08:40  Heparin 1000 units/ml, 4,000 units, IV, at 08:40  Nitroglycerin (200mcg/ml), 200 mcg, at 08:40  CONTRAST GIVEN: 60 ml Omnipaque (350mg I /ml)  RADIATION EXPOSURE:  Fluoroscopy time: 2 15 min      HEMODYNAMICS: Hemodynamic assessment demonstrated normal LVEDP      CORONARY VESSELS:   --  The coronary circulation is right dominant  --  Left main: Angiography showed minor luminal irregularities  --  LAD: Angiography showed minor luminal irregularities  --  Circumflex: Angiography showed minor luminal irregularities  --  RCA: Angiography showed minor luminal irregularities      IMPRESSIONS:  No significant epicardial CAD      RECOMMENDATIONS  med rx cad standpoint      Prepared and signed by  Miriam Rouse DO  Signed 01/07/2019 09:07:14    Imaging: I have personally reviewed pertinent reports      EKG: rapid afib / flutter  VTE Prophylaxis: xarelto     Code Status: Level 1 - Full Code  Advance Directive and Living Will:      Power of :    POLST:      Counseling / Coordination of Care  Total floor / unit time spent today 50 minutes  Greater than 50% of total time was spent with the patient and / or family counseling and / or coordination of care

## 2020-03-10 NOTE — PLAN OF CARE
Problem: PAIN - ADULT  Goal: Verbalizes/displays adequate comfort level or baseline comfort level  Description  Interventions:  - Encourage patient to monitor pain and request assistance  - Assess pain using appropriate pain scale  - Administer analgesics based on type and severity of pain and evaluate response  - Implement non-pharmacological measures as appropriate and evaluate response  - Consider cultural and social influences on pain and pain management  - Notify physician/advanced practitioner if interventions unsuccessful or patient reports new pain  Outcome: Progressing     Problem: INFECTION - ADULT  Goal: Absence or prevention of progression during hospitalization  Description  INTERVENTIONS:  - Assess and monitor for signs and symptoms of infection  - Monitor lab/diagnostic results  - Monitor all insertion sites, i e  indwelling lines, tubes, and drains  - Monitor endotracheal if appropriate and nasal secretions for changes in amount and color  - [place] appropriate cooling/warming therapies per order  - Administer medications as ordered  - Instruct and encourage patient and family to use good hand hygiene technique  - Identify and instruct in appropriate isolation precautions for identified infection/condition  Outcome: Progressing     Problem: SAFETY ADULT  Goal: Patient will remain free of falls  Description  INTERVENTIONS:  - Assess patient frequently for physical needs  -  Identify cognitive and physical deficits and behaviors that affect risk of falls    -  [place] fall precautions as indicated by assessment   - Educate patient/family on patient safety including physical limitations  - Instruct patient to call for assistance with activity based on assessment  - Modify environment to reduce risk of injury  - Consider OT/PT consult to assist with strengthening/mobility  Outcome: Progressing  Goal: Maintain or return to baseline ADL function  Description  INTERVENTIONS:  -  Assess patient's ability to carry out ADLs; assess patient's baseline for ADL function and identify physical deficits which impact ability to perform ADLs (bathing, care of mouth/teeth, toileting, grooming, dressing, etc )  - Assess/evaluate cause of self-care deficits   - Assess range of motion  - Assess patient's mobility; develop plan if impaired  - Assess patient's need for assistive devices and provide as appropriate  - Encourage maximum independence but intervene and supervise when necessary  - Involve family in performance of ADLs  - Assess for home care needs following discharge   - Consider OT consult to assist with ADL evaluation and planning for discharge  - Provide patient education as appropriate  Outcome: Progressing  Goal: Maintain or return mobility status to optimal level  Description  INTERVENTIONS:  - Assess patient's baseline mobility status (ambulation, transfers, stairs, etc )    - Identify cognitive and physical deficits and behaviors that affect mobility  - Identify mobility aids required to assist with transfers and/or ambulation (gait belt, sit-to-stand, lift, walker, cane, etc )  - [place] fall precautions as indicated by assessment  - Record patient progress and toleration of activity level on Mobility SBAR; progress patient to next Phase/Stage  - Instruct patient to call for assistance with activity based on assessment  - Consider rehabilitation consult to assist with strengthening/weightbearing, etc   Outcome: Progressing     Problem: DISCHARGE PLANNING  Goal: Discharge to home or other facility with appropriate resources  Description  INTERVENTIONS:  - Identify barriers to discharge w/patient and caregiver  - Arrange for needed discharge resources and transportation as appropriate  - Identify discharge learning needs (meds, wound care, etc )  - Arrange for interpretive services to assist at discharge as needed  - Refer to Case Management Department for coordinating discharge planning if the patient needs post-hospital services based on physician/advanced practitioner order or complex needs related to functional status, cognitive ability, or social support system  Outcome: Progressing     Problem: CARDIOVASCULAR - ADULT  Goal: Maintains optimal cardiac output and hemodynamic stability  Description  INTERVENTIONS:  - Monitor I/O, vital signs and rhythm  - Monitor for S/S and trends of decreased cardiac output  - Administer and titrate ordered vasoactive medications to optimize hemodynamic stability  - Assess quality of pulses, skin color and temperature  - Assess for signs of decreased coronary artery perfusion  - Instruct patient to report change in severity of symptoms  Outcome: Progressing  Goal: Absence of cardiac dysrhythmias or at baseline rhythm  Description  INTERVENTIONS:  - Continuous cardiac monitoring, vital signs, obtain 12 lead EKG if ordered  - Administer antiarrhythmic and heart rate control medications as ordered  - Monitor electrolytes and administer replacement therapy as ordered  Outcome: Progressing     Problem: GASTROINTESTINAL - ADULT  Goal: Minimal or absence of nausea and/or vomiting  Description  INTERVENTIONS:  - Administer IV fluids if ordered to ensure adequate hydration  - Maintain NPO status until nausea and vomiting are resolved  - Nasogastric tube if ordered  - Administer ordered antiemetic medications as needed  - Provide nonpharmacologic comfort measures as appropriate  - Advance diet as tolerated, if ordered  - Consider nutrition services referral to assist patient with adequate nutrition and appropriate food choices  Outcome: Progressing     Problem: METABOLIC, FLUID AND ELECTROLYTES - ADULT  Goal: Electrolytes maintained within normal limits  Description  INTERVENTIONS:  - Monitor labs and assess patient for signs and symptoms of electrolyte imbalances  - Administer electrolyte replacement as ordered  - Monitor response to electrolyte replacements, including repeat lab results as appropriate  - Instruct patient on fluid and nutrition as appropriate  Outcome: Progressing  Goal: Fluid balance maintained  Description  INTERVENTIONS:  - Monitor labs   - Monitor I/O and WT  - Instruct patient on fluid and nutrition as appropriate  - Assess for signs & symptoms of volume excess or deficit  Outcome: Progressing     Problem: SKIN/TISSUE INTEGRITY - ADULT  Goal: Skin integrity remains intact  Description  INTERVENTIONS  - Identify patients at risk for skin breakdown  - Assess and monitor skin integrity  - Assess and monitor nutrition and hydration status  - Monitor labs (i e  albumin)  - Assess for incontinence   - Turn and reposition patient  - Assist with mobility/ambulation  - Relieve pressure over bony prominences  - Avoid friction and shearing  - Provide appropriate hygiene as needed including keeping skin clean and dry  - Evaluate need for skin moisturizer/barrier cream  - Collaborate with interdisciplinary team (i e  Nutrition, Rehabilitation, etc )   - Patient/family teaching  Outcome: Progressing     Problem: MUSCULOSKELETAL - ADULT  Goal: Maintain or return mobility to safest level of function  Description  INTERVENTIONS:  - Assess patient's ability to carry out ADLs; assess patient's baseline for ADL function and identify physical deficits which impact ability to perform ADLs (bathing, care of mouth/teeth, toileting, grooming, dressing, etc )  - Assess/evaluate cause of self-care deficits   - Assess range of motion  - Assess patient's mobility  - Assess patient's need for assistive devices and provide as appropriate  - Encourage maximum independence but intervene and supervise when necessary  - Involve family in performance of ADLs  - Assess for home care needs following discharge   - Consider OT consult to assist with ADL evaluation and planning for discharge  - Provide patient education as appropriate  Outcome: Progressing

## 2020-03-10 NOTE — ASSESSMENT & PLAN NOTE
Wt Readings from Last 3 Encounters:   03/10/20 97 1 kg (214 lb 1 1 oz)   12/23/19 97 4 kg (214 lb 11 6 oz)   12/15/19 102 kg (223 lb 12 3 oz)     · Without acute exacerbation  · Chest x-ray with mild central pulmonary vascular congestion  · History of left heart catheterization in 2019 with minor luminal irregularities  · Echocardiogram completed in April of 2019 with normal EF    No repeat an echocardiogram recommended at this time by Cardiology  · Continue current medication regimen and advised daily weights

## 2020-03-10 NOTE — ASSESSMENT & PLAN NOTE
· Baseline creatinine 1 5-2  · Patient currently slightly below baseline  · Avoid nephrotoxins, avoid hypotension

## 2020-03-10 NOTE — ASSESSMENT & PLAN NOTE
· Likely secondary to AFib with RVR    No LOC or other focal neuro deficits  · Symptoms have resolved

## 2020-03-10 NOTE — UTILIZATION REVIEW
Initial Clinical Review    Admission: Date/Time/Statement: Admission Orders (From admission, onward)     Ordered        03/09/20 1526  Inpatient Admission (expected length of stay for this patient Order details is greater than two midnights)  Once                   Orders Placed This Encounter   Procedures    Inpatient Admission (expected length of stay for this patient Order details is greater than two midnights)     Standing Status:   Standing     Number of Occurrences:   1     Order Specific Question:   Admitting Physician     Answer:   Deacon Mora     Order Specific Question:   Level of Care     Answer:   Med Surg [16]     Order Specific Question:   Estimated length of stay     Answer:   More than 2 Midnights     Order Specific Question:   Certification     Answer:   I certify that inpatient services are medically necessary for this patient for a duration of greater than two midnights  See H&P and MD Progress Notes for additional information about the patient's course of treatment  ED Arrival Information     Expected Arrival Acuity Means of Arrival Escorted By Service Admission Type    - 3/9/2020 13:24 Emergent [address] [facility] Emergency    Arrival Complaint    Chest Pain         Chief Complaint   Patient presents with    Chest Pain     cp sob onset 1130     Assessment/Plan: 53 yo female to ED from home admitted as Inpatient due to Afib with RVR  PMHx PMH of AFib, history V-tach status post AICD, hypertrophic cardiomyopathy, tobacco abuse, hypertension, GERD presents with chest pain & palpitations  Patient reports she traveled to [facility] & returned day prior to admission  She was in normal state of health until day of admission and had acute onset of chest pain substernal non radiating described as Heaviness  Pain lasted hours in duration & constant in nature  Improved with inspiration   Pain associated with palpitations and dizziness while in grocery store (am of admission) that she got into a motorized cart to drive home the called her significant other to bring her for ED eval  IN ED: EKG: reveal AFib with RVR, she received x 2 IV Cardizem and a drip was begun  IV fluids & ASA  Consult Cardiology  Of note, patient has transportation issues, living with relative currently  Unable to see Cardiologist for last 2 months, she believes she still is taking Toprol extended release 75 mg BID & Lasix daily  Cont beta blocker 75 mg BID  Restart Xarelto  Trend TROPs & EKG  Monitor renal function, avoid hypotension & Nephrotoxins  3/10/2020 Cardiology:  Converted to NSR-chest discomfort resolved  Currently on IV Cardizem will DC  Check ICD interrogation for AFib burden  Patient not taking Xarelto bc she ran out-resume  UDS + cocaine but denies use  Elevated Trop-setting of tachycardia/Hypertrophic cardiomyopathy/cocaine: non MI trop elevation, 1/2019 Left heart cath minor luminal irregularities       ED Triage Vitals [03/09/20 1335]   Temperature Pulse Respirations Blood Pressure SpO2   97 8 °F (36 6 °C) 74 16 167/88 98 %      Temp Source Heart Rate Source Patient Position - Orthostatic VS BP Location FiO2 (%)   Oral Monitor Sitting Left arm --      Pain Score       Worst Possible Pain        Wt Readings from Last 1 Encounters:   03/10/20 97 1 kg (214 lb 1 1 oz)     Additional Vital Signs:   Date/Time  Temp  Pulse  Resp  BP  MAP (mmHg)  SpO2  O2 Device  Patient Position - Orthostatic VS   03/10/20 0700  98 7 °F (37 1 °C)  60  18  123/62    98 %  None (Room air)  Lying   03/09/20 1837    99    126/69           03/09/20 1638  98 1 °F (36 7 °C)  111Abnormal   18  110/82    96 %  None (Room air)  Lying   03/09/20 1530    130Abnormal     152/115Abnormal   127         03/09/20 1448    111Abnormal   18  159/77    95 %  None (Room air)     03/09/20 1418              None (Room air)     03/09/20 1335  97 8 °F (36 6 °C)  74  16  167/88    98 %  None (Room air) Sitting      Weights (last 14 days)     Date/Time  Weight  Weight Method  Height   03/10/20 0600  97 1 kg (214 lb 1 1 oz)  Standing scale     03/09/20 1638  97 2 kg (214 lb 3 2 oz)  Standing scale  5' 7" (1 702 m)   03/09/20 1335  99 8 kg (220 lb)           Pertinent Labs/Diagnostic Test Results:   Results from last 7 days   Lab Units 03/09/20 2042 03/09/20  1357   WBC Thousand/uL 5 55 6 96   HEMOGLOBIN g/dL 12 2 13 9   HEMATOCRIT % 37 7 43 8   PLATELETS Thousands/uL 137* 174   NEUTROS ABS Thousands/µL  --  4 61         Results from last 7 days   Lab Units 03/10/20  0515 03/09/20  1357   SODIUM mmol/L 140 140   POTASSIUM mmol/L 4 4 4 1   CHLORIDE mmol/L 107 103   CO2 mmol/L 26 26   ANION GAP mmol/L 7 11   BUN mg/dL 18 14   CREATININE mg/dL 1 44* 1 57*   EGFR ml/min/1 73sq m 41 37   CALCIUM mg/dL 8 7 9 2   MAGNESIUM mg/dL 2 3  --      Results from last 7 days   Lab Units 03/09/20  1357   AST U/L 35   ALT U/L 30   ALK PHOS U/L 90   TOTAL PROTEIN g/dL 9 1*   ALBUMIN g/dL 4 3   TOTAL BILIRUBIN mg/dL 0 36         Results from last 7 days   Lab Units 03/10/20  0515 03/09/20  1357   GLUCOSE RANDOM mg/dL 98 97         Results from last 7 days   Lab Units 03/10/20  0515   HEMOGLOBIN A1C % 5 3   EAG mg/dl 105     No results found for: BETA-HYDROXYBUTYRATE                   Results from last 7 days   Lab Units 03/10/20  0819 03/10/20  0515 03/09/20  2328 03/09/20 2042 03/09/20  1712 03/09/20  1357   TROPONIN I ng/mL 0 16* 0 18* 0 17* 0 17* 0 10* 0 05*     Results from last 7 days   Lab Units 03/09/20  1712   D-DIMER QUANTITATIVE ug/ml FEU 1 42*     Results from last 7 days   Lab Units 03/09/20  1357   PROTIME seconds 13 0   INR  1 04   PTT seconds 26       Results from last 7 days   Lab Units 03/09/20  1710   AMPH/METH  Negative   BARBITURATE UR  Negative   BENZODIAZEPINE UR  Negative   COCAINE UR  Positive*   METHADONE URINE  Negative   OPIATE UR  Positive*   PCP UR  Negative   THC UR  Negative     3/9 ekg: EKG: narrow complex irregular tachycardia    Concerning for a fib w/rvr  3/9  cxr pa/lateral         No vascular congestion or infiltrate noted on lateral view          ED Treatment:   Medication Administration from 03/09/2020 1324 to 03/09/2020 1621       Date/Time Order Dose Route Action     03/09/2020 1359 sodium chloride 0 9 % bolus 500 mL 500 mL Intravenous New Bag     03/09/2020 1402 diltiazem (CARDIZEM) injection 10 mg 10 mg Intravenous Given     03/09/2020 1402 aspirin tablet 325 mg 325 mg Oral Given     03/09/2020 1447 diltiazem (CARDIZEM) injection 20 mg 20 mg Intravenous Given     03/09/2020 1446 acetaminophen (TYLENOL) tablet 650 mg 650 mg Oral Given     03/09/2020 1545 rivaroxaban (XARELTO) tablet 15 mg 15 mg Oral Given     03/09/2020 1603 diltiazem (CARDIZEM) 125 mg in sodium chloride 0 9 % 125 mL infusion 7 5 mg/hr Intravenous Rate/Dose Change     03/09/2020 1539 diltiazem (CARDIZEM) 125 mg in sodium chloride 0 9 % 125 mL infusion 5 mg/hr Intravenous New Bag        Past Medical History:   Diagnosis Date    Arthritis     Atrial fibrillation (HCC)     Atrial fibrillation (HCC)     Breast lump     GERD (gastroesophageal reflux disease)     H/O transfusion 1987    Hepatitis C     resolved    Hepatitis C     Hyperlipidemia     Hypertension     Irregular heart beat     Pacemaker     Sleep apnea     no cpap     Present on Admission:   Elevated troponin   Chest pain   History of ventricular tachycardia (HCC) with ICD   Atrial fibrillation (HCC)   Hypertrophic cardiomyopathy ([facility] [address] )   Essential hypertension    Admitting Diagnosis: Chest pain [R07 9]  Rapid atrial fibrillation ([facility] [address] ) [I48 91]  Age/Sex: 54 y o  female  Admission Orders:  IP CONSULT TO CARDIOLOGY  telemtry  NM lung ventilation  Daily wt  I&O  Ambulate pt    Scheduled Medications:    Medications:  famotidine 20 mg Oral Daily   metoprolol succinate 100 mg Oral BID   metoprolol succinate 25 mg Oral Once   nicotine 1 patch Transdermal Daily pantoprazole 40 mg Oral Daily Before Breakfast     Continuous IV Infusions:    heparin (porcine) 3-30 Units/kg/hr (Order-Specific) Intravenous Titrated     PRN Meds:    heparin (porcine) 3,800 Units Intravenous PRN   heparin (porcine) 7,600 Units Intravenous PRN   morphine injection 2 mg Intravenous Q4H PRN   ondansetron 4 mg Intravenous Q6H PRN   traMADol 50 mg Oral Q6H PRN       Network Utilization Review Department  [email or web address]  ATTENTION: Please call with any questions or concerns to [phone] and carefully listen to the prompts so that you are directed to the right person  All voicemails are confidential   Krunal Tee all requests for admission clinical reviews, approved or denied determinations and any other requests to dedicated fax number below belonging to the campus where the patient is receiving treatment   List of dedicated fax numbers for the Facilities:  [address] DENIALS (Administrative/Medical Necessity) [phone]   [address] (Maternity/NICU/Pediatrics) [phone]   Betsy Rawls [phone]   Hank Cramer [phone]   [facility] [phone]   [facility] [phone]   [address] [facility] [address] [place] [address] [phone]   [facility]  [phone]   [address] [facility]  [address] [facility] [address] [facility] [phone]

## 2020-03-10 NOTE — ASSESSMENT & PLAN NOTE
· Atypical chest pain this is likely in the setting of AFib RVR  · No reported episodes of per patient since admission or noted with interrogation of AICD in ED  · Troponin is mildly elevated in the setting of acute kidney injury,  tachycardia, hypertrophic cardiomyopathy and findings of cocaine and opiate use on her UDS (patient denying use)  · D-dimer elevated, venous thrombus prophylaxis scan completed: ________________________  · Currently in sinus rhythm on telemetry  · Patient admits that she was been off her Xarelto secondary to not having a current prescription, states she was taking aspirin though  · Chest x-ray mild central pulmonary vascular congestion  · Cardiology advising that she follow-up with her established cardiologist as an outpatient

## 2020-03-10 NOTE — UTILIZATION REVIEW
Notification of Inpatient Admission/Inpatient Authorization Request   This is a Notification of Inpatient Admission for [address]  Be advised that this patient was admitted to our facility under Inpatient Status  Contact Christa Triana at [phone] for additional admission information  Eric [facility] DEPT  DEDICATED [phone]  Patient Name:   Vidal Thompson   YOB: 1965       [address]   [address]:   [address]  Tax ID: [id number]  NPI: [id number] Attending Provider/NPI: Markos Conley Md [[id number]]   Attending Physician:  EVA Jernigan  [facility] Practioner ID- [id number]  [address] [place] [address], [address] [place] [address]  Phone 1: [phone]  Fax: [phone]   Place of Service Code: 24     Place of Service Name:  [address] [place] [address]   Start Date: 3/9/20 1526     Discharge Date & Time: No discharge date for patient encounter  Type of Admission: Inpatient Status Discharge Disposition   (if discharged): Home/Self Care   Patient Diagnoses: Chest pain [R07 9]  Rapid atrial fibrillation [facility]) [I48 91]     Orders: Admission Orders (From admission, onward)     Ordered        03/09/20 1526  Inpatient Admission (expected length of stay for this patient Order details is greater than two midnights)  Once                    Assigned Utilization Review Contact: Christa Triana  Utilization   Network Utilization Review Department  Phone: [phone]; Fax [phone]  Email: [email or web address]   ATTENTION PAYERS: Please call the assigned Utilization  directly with any questions or concerns ALL voicemails in the department are confidential  Send all requests for admission clinical reviews, approved or denied determinations and any other requests to dedicated fax number belonging to the campus where the patient is receiving treatment

## 2020-03-15 ENCOUNTER — APPOINTMENT (EMERGENCY)
Dept: CT IMAGING | Facility: HOSPITAL | Age: 55
End: 2020-03-15
Payer: COMMERCIAL

## 2020-03-15 ENCOUNTER — HOSPITAL ENCOUNTER (EMERGENCY)
Facility: HOSPITAL | Age: 55
Discharge: HOME | End: 2020-03-15
Attending: EMERGENCY MEDICINE | Admitting: EMERGENCY MEDICINE
Payer: COMMERCIAL

## 2020-03-15 VITALS
TEMPERATURE: 97.6 F | BODY MASS INDEX: 32.41 KG/M2 | DIASTOLIC BLOOD PRESSURE: 60 MMHG | SYSTOLIC BLOOD PRESSURE: 128 MMHG | HEART RATE: 60 BPM | WEIGHT: 213.85 LBS | RESPIRATION RATE: 18 BRPM | OXYGEN SATURATION: 99 % | HEIGHT: 68 IN

## 2020-03-15 DIAGNOSIS — R10.9 FLANK PAIN: Primary | ICD-10-CM

## 2020-03-15 LAB
ALBUMIN SERPL BCP-MCNC: 3.9 G/DL (ref 3.5–5)
ALP SERPL-CCNC: 72 U/L (ref 46–116)
ALT SERPL W P-5'-P-CCNC: 36 U/L (ref 12–78)
ANION GAP SERPL CALCULATED.3IONS-SCNC: 8 MMOL/L (ref 4–13)
APTT PPP: 33 SECONDS (ref 23–37)
AST SERPL W P-5'-P-CCNC: 30 U/L (ref 5–45)
BASOPHILS # BLD AUTO: 0.04 THOUSANDS/ΜL (ref 0–0.1)
BASOPHILS NFR BLD AUTO: 1 % (ref 0–1)
BILIRUB SERPL-MCNC: 0.36 MG/DL (ref 0.2–1)
BILIRUB UR QL STRIP: NEGATIVE
BUN SERPL-MCNC: 19 MG/DL (ref 5–25)
CALCIUM SERPL-MCNC: 9 MG/DL (ref 8.3–10.1)
CHLORIDE SERPL-SCNC: 103 MMOL/L (ref 100–108)
CLARITY UR: CLEAR
CO2 SERPL-SCNC: 28 MMOL/L (ref 21–32)
COLOR UR: YELLOW
CREAT SERPL-MCNC: 1.7 MG/DL (ref 0.6–1.3)
EOSINOPHIL # BLD AUTO: 0.19 THOUSAND/ΜL (ref 0–0.61)
EOSINOPHIL NFR BLD AUTO: 3 % (ref 0–6)
ERYTHROCYTE [DISTWIDTH] IN BLOOD BY AUTOMATED COUNT: 13.5 % (ref 11.6–15.1)
GFR SERPL CREATININE-BSD FRML MDRD: 33 ML/MIN/1.73SQ M
GLUCOSE SERPL-MCNC: 159 MG/DL (ref 65–140)
GLUCOSE UR STRIP-MCNC: NEGATIVE MG/DL
HCT VFR BLD AUTO: 42.3 % (ref 34.8–46.1)
HGB BLD-MCNC: 13.4 G/DL (ref 11.5–15.4)
HGB UR QL STRIP.AUTO: NEGATIVE
IMM GRANULOCYTES # BLD AUTO: 0.02 THOUSAND/UL (ref 0–0.2)
IMM GRANULOCYTES NFR BLD AUTO: 0 % (ref 0–2)
INR PPP: 1.2 (ref 0.84–1.19)
KETONES UR STRIP-MCNC: NEGATIVE MG/DL
LEUKOCYTE ESTERASE UR QL STRIP: NEGATIVE
LYMPHOCYTES # BLD AUTO: 1.42 THOUSANDS/ΜL (ref 0.6–4.47)
LYMPHOCYTES NFR BLD AUTO: 19 % (ref 14–44)
MCH RBC QN AUTO: 27.2 PG (ref 26.8–34.3)
MCHC RBC AUTO-ENTMCNC: 31.7 G/DL (ref 31.4–37.4)
MCV RBC AUTO: 86 FL (ref 82–98)
MONOCYTES # BLD AUTO: 0.43 THOUSAND/ΜL (ref 0.17–1.22)
MONOCYTES NFR BLD AUTO: 6 % (ref 4–12)
NEUTROPHILS # BLD AUTO: 5.39 THOUSANDS/ΜL (ref 1.85–7.62)
NEUTS SEG NFR BLD AUTO: 71 % (ref 43–75)
NITRITE UR QL STRIP: NEGATIVE
NRBC BLD AUTO-RTO: 0 /100 WBCS
PH UR STRIP.AUTO: 6 [PH]
PLATELET # BLD AUTO: 166 THOUSANDS/UL (ref 149–390)
PMV BLD AUTO: 11.4 FL (ref 8.9–12.7)
POTASSIUM SERPL-SCNC: 4 MMOL/L (ref 3.5–5.3)
PROT SERPL-MCNC: 8.2 G/DL (ref 6.4–8.2)
PROT UR STRIP-MCNC: NEGATIVE MG/DL
PROTHROMBIN TIME: 14.6 SECONDS (ref 11.6–14.5)
RBC # BLD AUTO: 4.92 MILLION/UL (ref 3.81–5.12)
SODIUM SERPL-SCNC: 139 MMOL/L (ref 136–145)
SP GR UR STRIP.AUTO: 1.02 (ref 1–1.03)
UROBILINOGEN UR QL STRIP.AUTO: 0.2 E.U./DL
WBC # BLD AUTO: 7.49 THOUSAND/UL (ref 4.31–10.16)

## 2020-03-15 PROCEDURE — 85025 COMPLETE CBC W/AUTO DIFF WBC: CPT | Performed by: PHYSICIAN ASSISTANT

## 2020-03-15 PROCEDURE — 96361 HYDRATE IV INFUSION ADD-ON: CPT

## 2020-03-15 PROCEDURE — 81003 URINALYSIS AUTO W/O SCOPE: CPT | Performed by: PHYSICIAN ASSISTANT

## 2020-03-15 PROCEDURE — 74176 CT ABD & PELVIS W/O CONTRAST: CPT

## 2020-03-15 PROCEDURE — 96374 THER/PROPH/DIAG INJ IV PUSH: CPT

## 2020-03-15 PROCEDURE — 85730 THROMBOPLASTIN TIME PARTIAL: CPT | Performed by: PHYSICIAN ASSISTANT

## 2020-03-15 PROCEDURE — 36415 COLL VENOUS BLD VENIPUNCTURE: CPT | Performed by: PHYSICIAN ASSISTANT

## 2020-03-15 PROCEDURE — 80053 COMPREHEN METABOLIC PANEL: CPT | Performed by: PHYSICIAN ASSISTANT

## 2020-03-15 PROCEDURE — 99284 EMERGENCY DEPT VISIT MOD MDM: CPT | Performed by: PHYSICIAN ASSISTANT

## 2020-03-15 PROCEDURE — 99284 EMERGENCY DEPT VISIT MOD MDM: CPT

## 2020-03-15 PROCEDURE — 85610 PROTHROMBIN TIME: CPT | Performed by: PHYSICIAN ASSISTANT

## 2020-03-15 RX ORDER — MORPHINE SULFATE 4 MG/ML
4 INJECTION, SOLUTION INTRAMUSCULAR; INTRAVENOUS ONCE
Status: COMPLETED | OUTPATIENT
Start: 2020-03-15 | End: 2020-03-15

## 2020-03-15 RX ADMIN — SODIUM CHLORIDE 500 ML: 0.9 INJECTION, SOLUTION INTRAVENOUS at 12:44

## 2020-03-15 RX ADMIN — MORPHINE SULFATE 4 MG: 4 INJECTION INTRAVENOUS at 12:50

## 2020-03-15 NOTE — ED PROVIDER NOTES
History  Chief Complaint   Patient presents with    Flank Pain     Pt c/o left sided flank pain since Friday night  Radiates into lower abd  Denies nausea/vomiting  Pt reports "orange" urine  The patient is a 54year old female with history of AFib, hypertension, hyperlipidemia and hepatitis C who presents to the emergency department due to left-sided flank pain for the last 2 days  The patient states that the pain wraps around into the left side of her abdomen  She states it is been constant since it started 2 days ago and is progressively worsening  She states that her  told her that her urine also looks orange, although she denies any urinary symptoms at this time  She states she still able to eat and drink without issue  She states that she does not have any known history of kidney stones  She denies any recent injuries  She additionally denies fever, chills, chest pain, shortness of breath, nausea, vomiting, diarrhea, constipation, rash or headache  She rates the pain currently as a 9/10  She took Tylenol for her pain earlier today  History provided by:  Patient   used: No    Flank Pain   Associated symptoms: no chest pain, no chills, no cough, no diarrhea, no dysuria, no fever, no hematuria, no nausea, no shortness of breath, no sore throat and no vomiting        Prior to Admission Medications   Prescriptions Last Dose Informant Patient Reported? Taking? cyclobenzaprine (FLEXERIL) 5 mg tablet   Yes Yes   Sig: cyclobenzaprine 5 mg tablet   famotidine (PEPCID) 20 mg tablet  Self Yes Yes   Sig: Take 20 mg by mouth daily   furosemide (LASIX) 20 mg tablet   No Yes   Sig: Take 1 tablet (20 mg total) by mouth daily as needed (leg swelling) Do not take more than 3 times a week for now     furosemide (LASIX) 20 mg tablet   No No   Sig: take 1 tablet by mouth daily   metoprolol tartrate (LOPRESSOR) 100 mg tablet   No Yes   Sig: Take 1 tablet (100 mg total) by mouth every 12 (twelve) hours   nicotine (NICODERM CQ) 14 mg/24hr TD 24 hr patch Not Taking at Unknown time Self No No   Sig: Place 1 patch on the skin daily   Patient not taking: Reported on 3/15/2020   pantoprazole (PROTONIX) 40 mg tablet  Self No Yes   Sig: take 1 tablet by mouth daily 30 MINUTES BEFORE BREAKFAST   rivaroxaban (Xarelto) 20 mg tablet   No Yes   Sig: Take 1 tablet (20 mg total) by mouth daily with breakfast   traMADol (ULTRAM) 50 mg tablet Not Taking at Unknown time Self Yes No   Sig: Take 50 mg by mouth every 6 (six) hours as needed for moderate pain      Facility-Administered Medications: None       Past Medical History:   Diagnosis Date    Arthritis     Atrial fibrillation (HCC)     Atrial fibrillation (HCC)     Breast lump     GERD (gastroesophageal reflux disease)     H/O transfusion 1987    Hepatitis C     resolved    Hepatitis C     Hyperlipidemia     Hypertension     Irregular heart beat     Pacemaker     Sleep apnea     no cpap       Past Surgical History:   Procedure Laterality Date    CARDIAC CATHETERIZATION  01/07/2019    CARDIAC DEFIBRILLATOR PLACEMENT      CARDIAC PACEMAKER PLACEMENT  2016    AFIB     CHOLECYSTECTOMY      COLONOSCOPY      ELBOW SURGERY      HYSTERECTOMY      HYSTERECTOMY      JOINT REPLACEMENT Left 2015    TKR    KNEE SURGERY Left     MA ESOPHAGOGASTRODUODENOSCOPY TRANSORAL DIAGNOSTIC N/A 5/2/2018    Procedure: ESOPHAGOGASTRODUODENOSCOPY (EGD); Surgeon: Juani Marina MD;  Location: [facility];   Service: Gastroenterology    MA LARYNGOSCOPY,DIRCT,[facility] N/A 8/10/2018    Procedure: MICRO DIRECT LARYNGOSCOPY , EXCISION OF POLYPS, KTP LASER;  Surgeon: Harshil Pantoja MD;  Location: [facility];  Service: ENT    REPLACEMENT TOTAL KNEE Left     THROAT SURGERY      polyps removed    TOTAL HIP ARTHROPLASTY         Family History   Problem Relation Age of Onset    Arthritis Family     Cancer Family     Diabetes Family     Hypertension Family I have reviewed and agree with the history as documented  E-Cigarette/Vaping    E-Cigarette Use Never User      E-Cigarette/Vaping Substances     Social History     Tobacco Use    Smoking status: Current Every Day Smoker     Packs/day: 0 50     Years: 0 00     Pack years: 0 00     Types: Cigarettes    Smokeless tobacco: Never Used    Tobacco comment: about 3 daily   Substance Use Topics    Alcohol use: No    Drug use: No       Review of Systems   Constitutional: Negative for chills and fever  HENT: Negative for ear pain and sore throat  Eyes: Negative for redness and visual disturbance  Respiratory: Negative for cough and shortness of breath  Cardiovascular: Negative for chest pain  Gastrointestinal: Positive for abdominal pain  Negative for diarrhea, nausea and vomiting  Genitourinary: Positive for flank pain  Negative for dysuria and hematuria  Musculoskeletal: Negative for back pain, neck pain and neck stiffness  Skin: Negative for color change and rash  Neurological: Negative for dizziness, light-headedness and headaches  All other systems reviewed and are negative  Physical Exam  Physical Exam   Constitutional: She is oriented to person, place, and time  She appears well-developed and well-nourished  Non-toxic appearance  She does not have a sickly appearance  She does not appear ill  No distress  HENT:   Head: Normocephalic and atraumatic  Mouth/Throat: Uvula is midline, oropharynx is clear and moist and mucous membranes are normal    Eyes: Conjunctivae and lids are normal    Neck: Normal range of motion  Neck supple  Cardiovascular: Normal rate, regular rhythm, normal heart sounds and intact distal pulses  Pulmonary/Chest: Effort normal and breath sounds normal    Abdominal: Soft  She exhibits no distension  There is tenderness in the left upper quadrant and left lower quadrant  There is CVA tenderness (Left-sided)     Neurological: She is alert and oriented to person, place, and time  Skin: Skin is warm and dry  Nursing note and vitals reviewed        Vital Signs  ED Triage Vitals   Temperature Pulse Respirations Blood Pressure SpO2   03/15/20 1216 03/15/20 1216 03/15/20 1216 03/15/20 1216 03/15/20 1216   97 6 °F (36 4 °C) 59 14 161/74 100 %      Temp Source Heart Rate Source Patient Position - Orthostatic VS BP Location FiO2 (%)   03/15/20 1216 -- 03/15/20 1345 03/15/20 1345 --   Oral  Sitting Right arm       Pain Score       03/15/20 1216       9           Vitals:    03/15/20 1216 03/15/20 1345   BP: 161/74 128/60   Pulse: 59 60   Patient Position - Orthostatic VS:  Sitting         Visual Acuity      ED Medications  Medications   morphine (PF) 4 mg/mL injection 4 mg (4 mg Intravenous Given 3/15/20 1250)   sodium chloride 0 9 % bolus 500 mL (0 mL Intravenous Stopped 3/15/20 1344)       Diagnostic Studies  Results Reviewed     Procedure Component Value Units Date/Time    Comprehensive metabolic panel [[id number]]  (Abnormal) Collected:  03/15/20 1242    Lab Status:  Final result Specimen:  Blood from Arm, Left Updated:  03/15/20 1311     Sodium 139 mmol/L      Potassium 4 0 mmol/L      Chloride 103 mmol/L      CO2 28 mmol/L      ANION GAP 8 mmol/L      BUN 19 mg/dL      Creatinine 1 70 mg/dL      Glucose 159 mg/dL      Calcium 9 0 mg/dL      AST 30 U/L      ALT 36 U/L      Alkaline Phosphatase 72 U/L      Total Protein 8 2 g/dL      Albumin 3 9 g/dL      Total Bilirubin 0 36 mg/dL      eGFR 33 ml/min/1 73sq m     Narrative:       Issac guidelines for Chronic Kidney Disease (CKD):     Stage 1 with normal or high GFR (GFR > 90 mL/min/1 73 square meters)    Stage 2 Mild CKD (GFR = 60-89 mL/min/1 73 square meters)    Stage 3A Moderate CKD (GFR = 45-59 mL/min/1 73 square meters)    Stage 3B Moderate CKD (GFR = 30-44 mL/min/1 73 square meters)    Stage 4 Severe CKD (GFR = 15-29 mL/min/1 73 square meters)    Stage 5 End Stage CKD (GFR <15 Workstation performed: [id number]                    Procedures  Procedures         ED Course  ED Course as of Mar 15 1614   Leigh Hester Mar 15, 2020   1302 Received a call from Radiology  Patient is allergic to contrast   CT will be done without contrast                                     MDM  Number of Diagnoses or Management Options  Flank pain: new and requires workup  Diagnosis management comments: Patient presents left-sided flank pain radiating to left abdomen for the last 2 days  Differential includes pyelonephritis versus nephrolithiasis versus diverticulitis versus pulled muscle  Will obtain labs including urine this time as well as CT scan of abdomen  Patient reports she is in considerable pain and already took Tylenol today  She states she is not supposed to take Motrin  4 mg morphine ordered for pain management  Labs reviewed  Only significant finding is a elevated creatinine  Patient's baseline creatinine is generally elevated  I advised patient that she stay well hydrated over the next week and follow-up for repeat blood work next week with her primary care doctor  She acknowledged understanding  CT reviewed with no acute findings  I discussed this with the patient  I advised that the patient manage pain as musculoskeletal   I advised that she continue taking Tylenol as needed for pain and use heating pad over the area  I advised that if she does not have any improvement of symptoms in 2 days that she should follow up with her doctor for further evaluation  I advised if she develops any new or worsening symptoms, she should return to the emergency department  She acknowledged understanding and agrees with plan  Final re-evaluation, the patient reports significant improvement of pain, although does report some ongoing pain  Patient is stable for discharge         Amount and/or Complexity of Data Reviewed  Clinical lab tests: ordered and reviewed  Tests in the radiology section of CPT®: ordered and reviewed  Decide to obtain previous medical records or to obtain history from someone other than the patient: yes  Review and summarize past medical records: yes  Discuss the patient with other providers: yes    Risk of Complications, Morbidity, and/or Mortality  Presenting problems: low  Diagnostic procedures: low  Management options: low    Patient Progress  Patient progress: improved        Disposition  Final diagnoses:   Flank pain     Time reflects when diagnosis was documented in both MDM as applicable and the Disposition within this note     Time User Action Codes Description Comment    3/15/2020  2:32 PM Anjel De Anda Add [R10 9] Flank pain       ED Disposition     ED Disposition Condition Date/Time Comment    Discharge Stable Sun Mar 15, 2020  2:32 PM Leila John discharge to home/self care              Follow-up Information     Follow up With Specialties Details Why Contact Info Additional [address] [facility] Schedule an appointment as soon as possible for a visit in 2 days  [address] [address] Emergency Department Emergency Medicine  If symptoms worsen [address]  [phone] [facility],  [address], [address], [facility]          Discharge Medication List as of 3/15/2020  2:33 PM      CONTINUE these medications which have NOT CHANGED    Details   cyclobenzaprine (FLEXERIL) 5 mg tablet cyclobenzaprine 5 mg tablet, Historical Med      famotidine (PEPCID) 20 mg tablet Take 20 mg by mouth daily, Historical Med      !! furosemide (LASIX) 20 mg tablet Take 1 tablet (20 mg total) by mouth daily as needed (leg swelling) Do not take more than 3 times a week for now , Starting Thu 10/10/2019, Print      metoprolol tartrate (LOPRESSOR) 100 mg tablet Take 1 tablet (100 mg total) by mouth every 12 (twelve) hours, Starting Tue 3/10/2020, Normal      pantoprazole (PROTONIX) 40 mg tablet take 1 tablet by mouth daily 30 MINUTES BEFORE BREAKFAST, Normal      rivaroxaban (Xarelto) 20 mg tablet Take 1 tablet (20 mg total) by mouth daily with breakfast, Starting Tue 3/10/2020, Normal      !! furosemide (LASIX) 20 mg tablet take 1 tablet by mouth daily, Normal      nicotine (NICODERM CQ) 14 mg/24hr TD 24 hr patch Place 1 patch on the skin daily, Starting Wed 9/11/2019, Normal      traMADol (ULTRAM) 50 mg tablet Take 50 mg by mouth every 6 (six) hours as needed for moderate pain, Historical Med       !! - Potential duplicate medications found  Please discuss with provider  No discharge procedures on file      PDMP Review       Value Time User    PDMP Reviewed  Yes 10/9/2019 11:48 PM Jayden Ann MD          ED Provider  Electronically Signed by           Alvie Riedel, PA-C  03/15/20 0422

## 2020-03-15 NOTE — ED NOTES
Patient transported to [address] [place] [address], [address] [place] [address]  03/15/20 0166

## 2020-03-24 ENCOUNTER — TELEMEDICINE (OUTPATIENT)
Dept: CARDIOLOGY CLINIC | Facility: CLINIC | Age: 55
End: 2020-03-24
Payer: COMMERCIAL

## 2020-03-24 DIAGNOSIS — I48.0 PAROXYSMAL ATRIAL FIBRILLATION (HCC): Primary | Chronic | ICD-10-CM

## 2020-03-24 PROCEDURE — G2012 BRIEF CHECK IN BY MD/QHP: HCPCS | Performed by: INTERNAL MEDICINE

## 2020-03-24 NOTE — PROGRESS NOTES
HEART AND VASCULAR  CARDIAC ELECTROPHYSIOLOGY   HEART RHYTHM CENTER  [facility]    THIS A VIRTUAL TELEMEDICINE Outpatient Follow-up   Today's Date: 03/24/20      Patient name: Namita Vila  YOB: 1965  Sex: female       Chief Complaint: f/ua fib      ASSESSMENT:  Problem List Items Addressed This Visit     None        48 yo female       ) Paroxysaml afib and flutter for 5 years, [id number]=7 (Diastolic chf, htn, female) symptomatic w RVR episdoes  Since ICD feels much better overall but has episodes of afib paroxysmal every 2 weeks, often needing ER for Afib w RVR  Some possible cocaine related but she denies, but utox positive last time  Was also noncompliant w xarelto but has it now      ) VT  monomorphic (285bpm) ON July 5th for 50s, w/ teodoro syncope, she was in afib w/ RVR and slight morphology change and HR became regular, she thinks it was from [employer]  I checked he Loop recorder tracing myself, this is new diagnosis today, she has not had f/u w/ Dr Kishor Lambert    ) S/p dual chamber ICD placed for secondary [id number] (VT on loop recorder which was extracted at same time)  Had severe sinus damaris, sick sinus before that  ) SEVERE LVH 1 6cm w/o gradient may be hypertensive cardiomyopathy w/ diastolic CHF  Symptom of SOB on walkign NYHAII-III symptoms, no diuretics no edema  She does not have phone so no remote monitor      ) Remote h/o cocaine, clean for years  Cares for Kaiden Peres who is 1years old but cocaine posistive last admission  ) HCV  ) ongoain tobacco dependence    ) SURINDER on CPAP this year          PLAN:  1  F/u in 6 weeks perhaps can plan afib ablation but need to see if compliant w anticoagulation also we are not doing elective procedures w COVID crisis  Follow up in: 6 weeks    No orders of the defined types were placed in this encounter      There are no discontinued medications    HPI/Subjective:   48 yo female       ) Paroxysaml afib and flutter for 5 years, [id number]=3 (Diastolic chf, htn, female) symptomatic w RVR episdoes  Since ICD feels much better overall but has episodes of afib paroxysmal every 2 weeks, often needing ER for Afib w RVR  Some possible cocaine related but she denies, but utox positive last time  Was also noncompliant w xarelto but has it now      ) VT  monomorphic (285bpm) ON July 5th for 50s, w/ teodoro syncope, she was in afib w/ RVR and slight morphology change and HR became regular, she thinks it was from [employer]  I checked he Loop recorder tracing myself, this is new diagnosis today, she has not had f/u w/ Dr Lance Garvey    ) S/p dual chamber ICD placed for secondary [id number] (VT on loop recorder which was extracted at same time)  Had severe sinus damaris, sick sinus before that  ) SEVERE LVH 1 6cm w/o gradient may be hypertensive cardiomyopathy w/ diastolic CHF  Symptom of SOB on walkign NYHAII-III symptoms, no diuretics no edema  She does not have phone so no remote monitor      ) Remote h/o cocaine, clean for years  Cares for Roland Boateng who is 1years old but cocaine posistive last admission  ) HCV  ) ongoain tobacco dependence    ) SURINDER on CPAP this year    Please note HPI is listed by problem with with update following it, it is copied again in the assessment above and reflects medical decision making as well  Complete 12 point ROS reviewed and documented by the Medical Assistant prior to my televisit, I reviewed the finding  ROS is  otherwise non pertinent or negative except as per HPI pertinent positives in Cardiovascular and Respiratory emphasized           Family History   Problem Relation Age of Onset    Arthritis Family     Cancer Family     Diabetes Family     Hypertension Family        Social History     Socioeconomic History    Marital status: /Civil Union     Spouse name: Not on file    Number of children: Not on file    Years of education: Not on file    Highest education level: Not on file   Occupational History    Not on file   Social Needs    Financial resource strain: Not on file    Food insecurity:     Worry: Not on file     Inability: Not on file    Transportation needs:     Medical: Not on file     Non-medical: Not on file   Tobacco Use    Smoking status: Current Every Day Smoker     Packs/day: 0 50     Years: 0 00     Pack years: 0 00     Types: Cigarettes    Smokeless tobacco: Never Used    Tobacco comment: about 3 daily   Substance and Sexual Activity    Alcohol use: No    Drug use: No    Sexual activity: Not on file   Lifestyle    Physical activity:     Days per week: Not on file     Minutes per session: Not on file    Stress: Not on file   Relationships    Social connections:     Talks on phone: Not on file     Gets together: Not on file     Attends Oriental orthodox service: Not on file     Active member of club or organization: Not on file     Attends meetings of clubs or organizations: Not on file     Relationship status: Not on file    Intimate partner violence:     Fear of current or ex partner: Not on file     Emotionally abused: Not on file     Physically abused: Not on file     Forced sexual activity: Not on file   Other Topics Concern    Not on file   Social History Narrative    disabled         EXAM  There were no vitals filed for this visit          Lab Results:       LABS:      Chemistry        Component Value Date/Time    K 4 0 03/15/2020 1242     03/15/2020 1242    CO2 28 03/15/2020 1242    BUN 19 03/15/2020 1242    CREATININE 1 70 (H) 03/15/2020 1242        Component Value Date/Time    CALCIUM 9 0 03/15/2020 1242    ALKPHOS 72 03/15/2020 1242    AST 30 03/15/2020 1242    ALT 36 03/15/2020 1242            No results found for: CHOL  Lab Results   Component Value Date    HDL 39 (L) 03/10/2020    HDL 50 01/07/2019     Lab Results   Component Value Date    LDLCALC 64 03/10/2020    [facility] 80 01/07/2019     Lab Results   Component Value Date    TRIG 52 03/10/2020    TRIG 49 01/07/2019     No results found for: CHOLHDL    IMAGING: Ct Abdomen Pelvis Wo Contrast    Result Date: 3/15/2020  Narrative: CT ABDOMEN AND PELVIS WITHOUT IV CONTRAST INDICATION:   Left flank pain and LLQ pain  COMPARISON:  None  TECHNIQUE:  CT examination of the abdomen and pelvis was performed without intravenous contrast   Axial, sagittal, and coronal 2D reformatted images were created from the source data and submitted for interpretation  Radiation dose length product (DLP) for this visit:  656 mGy-cm   This examination, like all CT scans performed in the [facility], was performed utilizing techniques to minimize radiation dose exposure, including the use of iterative reconstruction and automated exposure control  Enteric contrast was administered  FINDINGS: ABDOMEN LOWER CHEST:  No clinically significant abnormality identified in the visualized lower chest  LIVER/BILIARY TREE:  Unremarkable  GALLBLADDER:  Gallbladder is surgically absent  SPLEEN:  Unremarkable  PANCREAS:  Unremarkable  ADRENAL GLANDS:  Unremarkable  KIDNEYS/URETERS:  Punctate nonobstructive right-sided calculi  No additional urinary calculi  STOMACH AND BOWEL:  Unremarkable  APPENDIX:  No findings to suggest appendicitis  ABDOMINOPELVIC CAVITY:  No ascites or free intraperitoneal air  No lymphadenopathy  VESSELS:  Unremarkable for patient's age  PELVIS REPRODUCTIVE ORGANS:  Unremarkable for patient's age  URINARY BLADDER:  Unremarkable  ABDOMINAL WALL/INGUINAL REGIONS:  There is a small fat-containing umbilical hernia  OSSEOUS STRUCTURES:  No acute fracture or destructive osseous lesion  Impression: Punctate right-sided nonobstructive renal calculi without additional urinary calculi identified   Workstation performed: [id number] Xr Chest 2 Views    Result Date: 3/10/2020  Narrative: CHEST INDICATION:   dyspnea  COMPARISON:  One view chest 10/9/2019 EXAM PERFORMED/VIEWS:  XR CHEST PA & LATERAL FINDINGS: Left chest wall cardiac pacer/defibrillator device in place  Cardiac silhouette mildly enlarged  Prominent central pulmonary vascular fracture  No airspace consolidation, pneumothorax, or pleural effusion  Surgical clips right upper quadrant of the abdomen attributed to prior cholecystectomy  Osseous structures appear within normal limits for patient age  Impression: Findings suggestive of mild central pulmonary vascular congestion  Otherwise, no radiographic evidence of acute intrathoracic process  Workstation performed: [id number]     Nm Lung Ventilation / Perfusion    Result Date: 3/10/2020  Narrative: VENTILATION AND PERFUSION SCAN INDICATION: Dyspnea on exertion COMPARISON:  Chest radiograph  3/9/2020 TECHNIQUE:  Posterior ventilation imaging was performed after the inhalation of 11 57 mCi Xe-133 gas  Multiplanar perfusion imaging was next performed following the intravenous administration of 3 93 mCi Tc-99m labeled MAA  FINDINGS:  Ventilation imaging demonstrates a homogeneous distribution of radiopharmaceutical throughout both lungs on the urogram phase, with patchy air trapping bilaterally on the washout phase  Perfusion imaging demonstrates a heterogeneous distribution of the radiopharmaceutical throughout both lungs, predominantly on the left, but matching the ventilation pattern  There is no significant  segmental or subsegmental mismatched defect  Impression: The probability for pulmonary embolus is low  Workstation performed: [id number]     Vas Lower Limb Venous Duplex Study, Complete Bilateral    Result Date: 3/10/2020  Narrative:  THE VASCULAR CENTER REPORT CLINICAL: Indications: The patient was recently on a 9 hour car ride and is experiencing A-fib and chest pain   Operative History: Cholecystectomy Hysterectomy Pacemaker Risk Factors The patient has history of HTN, HLD and CKD  She has no history of DVT or Recent Trauma  FINDINGS:  Segment  Right            Left              Impression       Impression       CFV      Normal (Patent)  Normal (Patent)     CONCLUSION:  Impression: RIGHT LOWER LIMB: No evidence of acute or chronic deep vein thrombosis  No evidence of superficial thrombophlebitis noted  Doppler evaluation shows a normal response to augmentation maneuvers  Popliteal, posterior tibial and anterior tibial arterial Doppler waveforms are triphasic  LEFT LOWER LIMB: No evidence of acute or chronic deep vein thrombosis  No evidence of superficial thrombophlebitis noted  Doppler evaluation shows a normal response to augmentation maneuvers  Popliteal, posterior tibial and anterior tibial arterial Doppler waveforms are triphasic  SIGNATURE: Electronically Signed Radha Meehan on 2020-03-10 07:05:29 AM       Cardiac testing:   Results for orders placed during the hospital encounter of 19   Echo complete with contrast if indicated    Narrative [place] [id number], [address] [facility]  [phone]    Transthoracic Echocardiogram  2D, M-mode, Doppler, and Color Doppler    Study date:  2019    Patient: David Bhatt  MR number: [id number]  Account number: [de-identified]  : 1965  Age: 47 years  Gender: Female  Status: Outpatient  Location: [address] [place] [address] and Vascular Center  Height: 67 in  Weight: 258 5 lb  BP: 114/ 84 mmHg    Indications: Atrial fibrillation  Diagnoses: I48 0 - Atrial fibrillation    Sonographer:  Marco Damon RDCS  Primary Physician:  Mason Genao MD  Referring Physician: CHARLOTTE Park  Group:  [facility]  RN:  Joycelyn Holloway RN  Interpreting Physician:  Pawel Dias MD    SUMMARY    LEFT VENTRICLE:  Systolic function was normal  Ejection fraction was estimated to be 65 %    There were no regional wall motion abnormalities  Septal wall thickness was markedly increased  (2cm)  However the posterior wall was not seen well enough for accurate quantification of wall thickness  There was no dynamic obstruction  Doppler parameters were consistent with abnormal left ventricular relaxation (grade 1 diastolic dysfunction)  HISTORY: PRIOR HISTORY: Hypertension, ventricular tachycardia, internal cardiac defibrillator implant, hypertrophic obstructive cardiomyopathy, NSTEMI, current smoker  PROCEDURE: The study was performed in the [address] [place] [address] and Vascular Center  This was a routine study  The transthoracic approach was used  The study included complete 2D imaging, M-mode, complete spectral Doppler, and color Doppler  The  heart rate was 60 bpm, at the start of the study  Images were obtained from the parasternal, apical, subcostal, and suprasternal notch acoustic windows  Intravenous contrast ( 0 8 ml Definity in nss) was administered  Intravenous contrast  was administered to opacify the left ventricle  Echocardiographic views were limited due to poor acoustic window availability, decreased penetration, and lung interference  This was a technically difficult study  LEFT VENTRICLE: Size was normal  Systolic function was normal  Ejection fraction was estimated to be 65 %  There were no regional wall motion abnormalities  Septal wall thickness was markedly increased  (2cm)  However the posterior wall was not seen well enough for accurate quantification of wall thickness  DOPPLER: There was no dynamic obstruction  Doppler parameters were consistent with abnormal left ventricular relaxation (grade 1 diastolic  dysfunction)  RIGHT VENTRICLE: The size was normal  Systolic function was normal  Wall thickness was normal     LEFT ATRIUM: Size was normal     RIGHT ATRIUM: Size was normal     MITRAL VALVE: Valve structure was normal  There was normal leaflet separation   DOPPLER: The transmitral velocity was within the normal range  There was no evidence for stenosis  There was no significant regurgitation  AORTIC VALVE: The valve was trileaflet  Leaflets exhibited normal thickness and normal cuspal separation  DOPPLER: Transaortic velocity was within the normal range  There was no evidence for stenosis  There was no significant  regurgitation  TRICUSPID VALVE: The valve structure was normal  There was normal leaflet separation  DOPPLER: The transtricuspid velocity was within the normal range  There was no evidence for stenosis  There was no significant regurgitation  PULMONIC VALVE: Leaflets exhibited normal thickness, no calcification, and normal cuspal separation  DOPPLER: The transpulmonic velocity was within the normal range  There was no significant regurgitation  PERICARDIUM: There was no pericardial effusion  The pericardium was normal in appearance  AORTA: The root exhibited normal size  SYSTEMIC VEINS: IVC: The inferior vena cava was not well visualized  SYSTEM MEASUREMENT TABLES    2D  %FS: 26 28 %  AV Diam: 3 05 cm  EDV(Teich): 63 61 ml  EF(Cube): 59 94 %  EF(Teich): 52 21 %  ESV(Cube): 22 72 ml  ESV(Teich): 30 4 ml  IVSd: 2 cm  LA Diam: 3 66 cm  LVIDd: 3 84 cm  LVIDs: 2 83 cm  LVPWd: 2 04 cm  SI(Cube): 15 05 ml/m2  SI(Teich): 14 7 ml/m2  SV(Cube): 34 ml  SV(Teich): 33 21 ml    CW  TR MaxPG: 10 5 mmHg  TR Vmax: 1 62 m/s    MM  TAPSE: 2 98 cm    PW  E': 0 06 m/s  E/E': 14 83  MV A Zeferino: 0 86 m/s  MV Dec Wagoner: 2 4 m/s2  MV DecT: 357 04 ms  MV E Zeferino: 0 86 m/s  MV E/A Ratio: 1    [employer] Accredited Echocardiography Laboratory    Prepared and electronically signed by    Pollo Lord MD  Signed 18-Apr-2019 12:25:42       No results found for this or any previous visit  No results found for this or any previous visit  No results found for this or any previous visit          I reviewed and interpreted the following LABS/EKG/TELE/IMAGING and below is summary of my interpretation (if data available):    EKG 3/9/20 Afib w RVR    #######            Virtual Regular Visit    Problem List Items Addressed This Visit     None               Reason for visit is afib  Encounter provider Jennifer Mosley MD    Provider located at [address] [place] [address]      Recent Visits  No visits were found meeting these conditions  Showing recent visits within past 7 days and meeting all other requirements     Future Appointments  No visits were found meeting these conditions  Showing future appointments within next 150 days and meeting all other requirements        After connecting through "[employer]o, the patient was identified by name and date of birth  Ning Espinoza was informed that this is a telemedicine visit and that the visit is being conducted through telephone which may not be secure and therefore, might not be HIPAA-compliant  My office door was closed  No one else was in the room  She acknowledged consent and understanding of privacy and security of the video platform  The patient has agreed to participate and understands they can discontinue the visit at any time  Subjective  Ning Espinoza is a 54 y o  female raj ram        Past Medical History:   Diagnosis Date    Arthritis     Atrial fibrillation ([address] )     Atrial fibrillation (HCC)     Breast lump     GERD (gastroesophageal reflux disease)     H/O transfusion 1987    Hepatitis C     resolved    Hepatitis C     Hyperlipidemia     Hypertension     Irregular heart beat     Pacemaker     Sleep apnea     no cpap       Past Surgical History:   Procedure Laterality Date    CARDIAC CATHETERIZATION  01/07/2019    CARDIAC DEFIBRILLATOR PLACEMENT      CARDIAC PACEMAKER PLACEMENT  2016    AFIB     CHOLECYSTECTOMY      COLONOSCOPY      ELBOW SURGERY      HYSTERECTOMY      HYSTERECTOMY      JOINT REPLACEMENT Left 2015    TKR    KNEE SURGERY Left     NE ESOPHAGOGASTRODUODENOSCOPY TRANSORAL DIAGNOSTIC N/A 5/2/2018    Procedure: ESOPHAGOGASTRODUODENOSCOPY (EGD); Surgeon: Alonzo Chappell MD;  Location: [facility]; Service: Gastroenterology    ID LARYNGOSCOPY,DIRCT,OP [facility] N/A 8/10/2018    Procedure: MICRO DIRECT LARYNGOSCOPY , EXCISION OF POLYPS, KTP LASER;  Surgeon: Hyacinth Seo MD;  [facility];  Service: ENT    REPLACEMENT TOTAL KNEE Left     THROAT SURGERY      polyps removed    TOTAL HIP ARTHROPLASTY         Current Outpatient Medications   Medication Sig Dispense Refill    cyclobenzaprine (FLEXERIL) 5 mg tablet cyclobenzaprine 5 mg tablet      famotidine (PEPCID) 20 mg tablet Take 20 mg by mouth daily      furosemide (LASIX) 20 mg tablet Take 1 tablet (20 mg total) by mouth daily as needed (leg swelling) Do not take more than 3 times a week for now   0    furosemide (LASIX) 20 mg tablet take 1 tablet by mouth daily 30 tablet 3    metoprolol tartrate (LOPRESSOR) 100 mg tablet Take 1 tablet (100 mg total) by mouth every 12 (twelve) hours 60 tablet 0    nicotine (NICODERM CQ) 14 mg/24hr TD 24 hr patch Place 1 patch on the skin daily (Patient not taking: Reported on 3/15/2020) 28 patch 0    pantoprazole (PROTONIX) 40 mg tablet take 1 tablet by mouth daily 30 MINUTES BEFORE BREAKFAST 30 tablet 6    rivaroxaban (Xarelto) 20 mg tablet Take 1 tablet (20 mg total) by mouth daily with breakfast 30 tablet 1    traMADol (ULTRAM) 50 mg tablet Take 50 mg by mouth every 6 (six) hours as needed for moderate pain       No current facility-administered medications for this visit  Allergies   Allergen Reactions    Iodinated Diagnostic Agents Hives    Tape  [Medical Tape] Hives        I spent 15 minutes with the patient during this visit

## 2020-04-18 ENCOUNTER — HOSPITAL ENCOUNTER (EMERGENCY)
Facility: HOSPITAL | Age: 55
Discharge: HOME | End: 2020-04-18
Attending: EMERGENCY MEDICINE | Admitting: EMERGENCY MEDICINE
Payer: COMMERCIAL

## 2020-04-18 ENCOUNTER — APPOINTMENT (EMERGENCY)
Dept: RADIOLOGY | Facility: HOSPITAL | Age: 55
End: 2020-04-18
Payer: COMMERCIAL

## 2020-04-18 VITALS
TEMPERATURE: 98.1 F | RESPIRATION RATE: 18 BRPM | WEIGHT: 220.46 LBS | BODY MASS INDEX: 34.02 KG/M2 | SYSTOLIC BLOOD PRESSURE: 122 MMHG | OXYGEN SATURATION: 95 % | HEART RATE: 98 BPM | DIASTOLIC BLOOD PRESSURE: 96 MMHG

## 2020-04-18 DIAGNOSIS — I48.91 RAPID ATRIAL FIBRILLATION (HCC): Primary | ICD-10-CM

## 2020-04-18 DIAGNOSIS — N17.9 ACUTE-ON-CHRONIC KIDNEY INJURY (HCC): ICD-10-CM

## 2020-04-18 DIAGNOSIS — N18.9 ACUTE-ON-CHRONIC KIDNEY INJURY (HCC): ICD-10-CM

## 2020-04-18 LAB
ALBUMIN SERPL BCP-MCNC: 4 G/DL (ref 3.5–5)
ALP SERPL-CCNC: 89 U/L (ref 46–116)
ALT SERPL W P-5'-P-CCNC: 27 U/L (ref 12–78)
ANION GAP SERPL CALCULATED.3IONS-SCNC: 7 MMOL/L (ref 4–13)
APTT PPP: 34 SECONDS (ref 23–37)
AST SERPL W P-5'-P-CCNC: 26 U/L (ref 5–45)
ATRIAL RATE: 122 BPM
ATRIAL RATE: 174 BPM
BASOPHILS # BLD AUTO: 0.04 THOUSANDS/ΜL (ref 0–0.1)
BASOPHILS NFR BLD AUTO: 1 % (ref 0–1)
BILIRUB SERPL-MCNC: 0.3 MG/DL (ref 0.2–1)
BUN SERPL-MCNC: 19 MG/DL (ref 5–25)
CALCIUM SERPL-MCNC: 8.9 MG/DL (ref 8.3–10.1)
CHLORIDE SERPL-SCNC: 102 MMOL/L (ref 100–108)
CO2 SERPL-SCNC: 29 MMOL/L (ref 21–32)
CREAT SERPL-MCNC: 1.93 MG/DL (ref 0.6–1.3)
EOSINOPHIL # BLD AUTO: 0.11 THOUSAND/ΜL (ref 0–0.61)
EOSINOPHIL NFR BLD AUTO: 2 % (ref 0–6)
ERYTHROCYTE [DISTWIDTH] IN BLOOD BY AUTOMATED COUNT: 13.4 % (ref 11.6–15.1)
GFR SERPL CREATININE-BSD FRML MDRD: 29 ML/MIN/1.73SQ M
GLUCOSE SERPL-MCNC: 99 MG/DL (ref 65–140)
HCT VFR BLD AUTO: 42.5 % (ref 34.8–46.1)
HGB BLD-MCNC: 13.1 G/DL (ref 11.5–15.4)
IMM GRANULOCYTES # BLD AUTO: 0.02 THOUSAND/UL (ref 0–0.2)
IMM GRANULOCYTES NFR BLD AUTO: 0 % (ref 0–2)
INR PPP: 1.51 (ref 0.84–1.19)
LYMPHOCYTES # BLD AUTO: 2.03 THOUSANDS/ΜL (ref 0.6–4.47)
LYMPHOCYTES NFR BLD AUTO: 29 % (ref 14–44)
MAGNESIUM SERPL-MCNC: 2.2 MG/DL (ref 1.6–2.6)
MCH RBC QN AUTO: 26.7 PG (ref 26.8–34.3)
MCHC RBC AUTO-ENTMCNC: 30.8 G/DL (ref 31.4–37.4)
MCV RBC AUTO: 87 FL (ref 82–98)
MONOCYTES # BLD AUTO: 0.48 THOUSAND/ΜL (ref 0.17–1.22)
MONOCYTES NFR BLD AUTO: 7 % (ref 4–12)
NEUTROPHILS # BLD AUTO: 4.35 THOUSANDS/ΜL (ref 1.85–7.62)
NEUTS SEG NFR BLD AUTO: 61 % (ref 43–75)
NRBC BLD AUTO-RTO: 0 /100 WBCS
P AXIS: 123 DEGREES
PLATELET # BLD AUTO: 135 THOUSANDS/UL (ref 149–390)
PMV BLD AUTO: 11.5 FL (ref 8.9–12.7)
POTASSIUM SERPL-SCNC: 4 MMOL/L (ref 3.5–5.3)
PROT SERPL-MCNC: 8.2 G/DL (ref 6.4–8.2)
PROTHROMBIN TIME: 17.5 SECONDS (ref 11.6–14.5)
QRS AXIS: -46 DEGREES
QRS AXIS: -46 DEGREES
QRSD INTERVAL: 148 MS
QRSD INTERVAL: 150 MS
QT INTERVAL: 322 MS
QT INTERVAL: 336 MS
QTC INTERVAL: 496 MS
QTC INTERVAL: 516 MS
RBC # BLD AUTO: 4.91 MILLION/UL (ref 3.81–5.12)
SODIUM SERPL-SCNC: 138 MMOL/L (ref 136–145)
T WAVE AXIS: 112 DEGREES
T WAVE AXIS: 98 DEGREES
TROPONIN I SERPL-MCNC: 0.04 NG/ML
TSH SERPL DL<=0.05 MIU/L-ACNC: 0.97 UIU/ML (ref 0.36–3.74)
VENTRICULAR RATE: 142 BPM
VENTRICULAR RATE: 143 BPM
WBC # BLD AUTO: 7.03 THOUSAND/UL (ref 4.31–10.16)

## 2020-04-18 PROCEDURE — 96376 TX/PRO/DX INJ SAME DRUG ADON: CPT

## 2020-04-18 PROCEDURE — 85730 THROMBOPLASTIN TIME PARTIAL: CPT | Performed by: PHYSICIAN ASSISTANT

## 2020-04-18 PROCEDURE — 71045 X-RAY EXAM CHEST 1 VIEW: CPT

## 2020-04-18 PROCEDURE — 36415 COLL VENOUS BLD VENIPUNCTURE: CPT | Performed by: PHYSICIAN ASSISTANT

## 2020-04-18 PROCEDURE — 84484 ASSAY OF TROPONIN QUANT: CPT | Performed by: PHYSICIAN ASSISTANT

## 2020-04-18 PROCEDURE — 93005 ELECTROCARDIOGRAM TRACING: CPT

## 2020-04-18 PROCEDURE — 80053 COMPREHEN METABOLIC PANEL: CPT | Performed by: PHYSICIAN ASSISTANT

## 2020-04-18 PROCEDURE — 93010 ELECTROCARDIOGRAM REPORT: CPT | Performed by: INTERNAL MEDICINE

## 2020-04-18 PROCEDURE — 99285 EMERGENCY DEPT VISIT HI MDM: CPT

## 2020-04-18 PROCEDURE — 96365 THER/PROPH/DIAG IV INF INIT: CPT

## 2020-04-18 PROCEDURE — 84443 ASSAY THYROID STIM HORMONE: CPT | Performed by: EMERGENCY MEDICINE

## 2020-04-18 PROCEDURE — 83735 ASSAY OF MAGNESIUM: CPT | Performed by: EMERGENCY MEDICINE

## 2020-04-18 PROCEDURE — 85025 COMPLETE CBC W/AUTO DIFF WBC: CPT | Performed by: PHYSICIAN ASSISTANT

## 2020-04-18 PROCEDURE — 85610 PROTHROMBIN TIME: CPT | Performed by: PHYSICIAN ASSISTANT

## 2020-04-18 PROCEDURE — 99284 EMERGENCY DEPT VISIT MOD MDM: CPT | Performed by: EMERGENCY MEDICINE

## 2020-04-18 RX ORDER — METOPROLOL TARTRATE 5 MG/5ML
5 INJECTION INTRAVENOUS ONCE
Status: DISCONTINUED | OUTPATIENT
Start: 2020-04-18 | End: 2020-04-18

## 2020-04-18 RX ORDER — ASPIRIN 81 MG/1
324 TABLET, CHEWABLE ORAL ONCE
Status: COMPLETED | OUTPATIENT
Start: 2020-04-18 | End: 2020-04-18

## 2020-04-18 RX ORDER — DILTIAZEM HYDROCHLORIDE 5 MG/ML
10 INJECTION INTRAVENOUS ONCE
Status: COMPLETED | OUTPATIENT
Start: 2020-04-18 | End: 2020-04-18

## 2020-04-18 RX ADMIN — DILTIAZEM HYDROCHLORIDE 10 MG: 5 INJECTION INTRAVENOUS at 15:16

## 2020-04-18 RX ADMIN — SODIUM CHLORIDE 500 ML: 0.9 INJECTION, SOLUTION INTRAVENOUS at 16:19

## 2020-04-18 RX ADMIN — DILTIAZEM HYDROCHLORIDE 5 MG/HR: 5 INJECTION INTRAVENOUS at 16:20

## 2020-04-18 RX ADMIN — ASPIRIN 81 MG 324 MG: 81 TABLET ORAL at 15:17

## 2020-04-20 ENCOUNTER — REMOTE DEVICE CLINIC VISIT (OUTPATIENT)
Dept: CARDIOLOGY CLINIC | Facility: CLINIC | Age: 55
End: 2020-04-20
Payer: COMMERCIAL

## 2020-04-20 DIAGNOSIS — Z95.810 PRESENCE OF AUTOMATIC CARDIOVERTER/DEFIBRILLATOR (AICD): Primary | ICD-10-CM

## 2020-04-20 PROCEDURE — 93295 DEV INTERROG REMOTE 1/2/MLT: CPT | Performed by: INTERNAL MEDICINE

## 2020-04-20 PROCEDURE — 93296 REM INTERROG EVL PM/IDS: CPT | Performed by: INTERNAL MEDICINE

## 2020-05-07 ENCOUNTER — TELEMEDICINE (OUTPATIENT)
Dept: CARDIOLOGY CLINIC | Facility: CLINIC | Age: 55
End: 2020-05-07
Payer: COMMERCIAL

## 2020-05-07 ENCOUNTER — REMOTE DEVICE CLINIC VISIT (OUTPATIENT)
Dept: CARDIOLOGY CLINIC | Facility: CLINIC | Age: 55
End: 2020-05-07
Payer: COMMERCIAL

## 2020-05-07 VITALS — BODY MASS INDEX: 30.62 KG/M2 | HEIGHT: 68 IN | WEIGHT: 202 LBS

## 2020-05-07 DIAGNOSIS — J04.0 REFLUX LARYNGITIS: ICD-10-CM

## 2020-05-07 DIAGNOSIS — K21.9 REFLUX LARYNGITIS: ICD-10-CM

## 2020-05-07 DIAGNOSIS — I48.0 PAROXYSMAL ATRIAL FIBRILLATION (HCC): Primary | ICD-10-CM

## 2020-05-07 DIAGNOSIS — I48.91 ATRIAL FIBRILLATION, UNSPECIFIED TYPE (HCC): Chronic | ICD-10-CM

## 2020-05-07 DIAGNOSIS — Z95.810 PRESENCE OF IMPLANTABLE CARDIOVERTER-DEFIBRILLATOR (ICD): Primary | ICD-10-CM

## 2020-05-07 DIAGNOSIS — K21.00 GASTROESOPHAGEAL REFLUX DISEASE WITH ESOPHAGITIS: ICD-10-CM

## 2020-05-07 PROCEDURE — 99213 OFFICE O/P EST LOW 20 MIN: CPT | Performed by: INTERNAL MEDICINE

## 2020-05-07 PROCEDURE — RECHECK: Performed by: INTERNAL MEDICINE

## 2020-05-07 RX ORDER — METOPROLOL TARTRATE 100 MG/1
TABLET ORAL
Qty: 60 TABLET | Refills: 0 | Status: SHIPPED | OUTPATIENT
Start: 2020-05-07 | End: 2020-05-08 | Stop reason: SDUPTHER

## 2020-05-07 RX ORDER — PANTOPRAZOLE SODIUM 40 MG/1
TABLET, DELAYED RELEASE ORAL
Qty: 30 TABLET | Refills: 6 | OUTPATIENT
Start: 2020-05-07

## 2020-05-08 DIAGNOSIS — I48.91 ATRIAL FIBRILLATION, UNSPECIFIED TYPE (HCC): Chronic | ICD-10-CM

## 2020-05-08 RX ORDER — PANTOPRAZOLE SODIUM 40 MG/1
40 TABLET, DELAYED RELEASE ORAL
Qty: 30 TABLET | Refills: 1 | Status: SHIPPED | OUTPATIENT
Start: 2020-05-08 | End: 2020-05-11 | Stop reason: SDUPTHER

## 2020-05-08 RX ORDER — METOPROLOL TARTRATE 100 MG/1
100 TABLET ORAL EVERY 12 HOURS
Qty: 60 TABLET | Refills: 0 | Status: SHIPPED | OUTPATIENT
Start: 2020-05-08 | End: 2020-05-11 | Stop reason: SDUPTHER

## 2020-05-11 ENCOUNTER — TELEPHONE (OUTPATIENT)
Dept: INTERNAL MEDICINE CLINIC | Facility: CLINIC | Age: 55
End: 2020-05-11

## 2020-05-11 DIAGNOSIS — I48.91 ATRIAL FIBRILLATION, UNSPECIFIED TYPE (HCC): Chronic | ICD-10-CM

## 2020-05-11 DIAGNOSIS — K21.9 REFLUX LARYNGITIS: ICD-10-CM

## 2020-05-11 DIAGNOSIS — K21.00 REFLUX ESOPHAGITIS: Primary | ICD-10-CM

## 2020-05-11 DIAGNOSIS — J04.0 REFLUX LARYNGITIS: ICD-10-CM

## 2020-05-11 DIAGNOSIS — K21.00 GASTROESOPHAGEAL REFLUX DISEASE WITH ESOPHAGITIS: ICD-10-CM

## 2020-05-11 RX ORDER — PANTOPRAZOLE SODIUM 40 MG/1
40 TABLET, DELAYED RELEASE ORAL
Qty: 30 TABLET | Refills: 1 | Status: SHIPPED | OUTPATIENT
Start: 2020-05-11 | End: 2020-08-18

## 2020-05-11 RX ORDER — METOPROLOL TARTRATE 100 MG/1
100 TABLET ORAL EVERY 12 HOURS
Qty: 60 TABLET | Refills: 1 | Status: SHIPPED | OUTPATIENT
Start: 2020-05-11 | End: 2020-08-06 | Stop reason: ALTCHOICE

## 2020-05-14 ENCOUNTER — HOSPITAL ENCOUNTER (INPATIENT)
Facility: HOSPITAL | Age: 55
LOS: 1 days | Discharge: HOME | DRG: 201 | End: 2020-05-15
Attending: EMERGENCY MEDICINE | Admitting: INTERNAL MEDICINE
Payer: COMMERCIAL

## 2020-05-14 ENCOUNTER — APPOINTMENT (EMERGENCY)
Dept: RADIOLOGY | Facility: HOSPITAL | Age: 55
DRG: 201 | End: 2020-05-14
Payer: COMMERCIAL

## 2020-05-14 DIAGNOSIS — I48.91 ATRIAL FIBRILLATION WITH RVR (HCC): ICD-10-CM

## 2020-05-14 DIAGNOSIS — F17.210 CIGARETTE NICOTINE DEPENDENCE WITHOUT COMPLICATION: Chronic | ICD-10-CM

## 2020-05-14 DIAGNOSIS — I48.91 ATRIAL FIBRILLATION (HCC): Primary | ICD-10-CM

## 2020-05-14 DIAGNOSIS — R07.9 CHEST PAIN: ICD-10-CM

## 2020-05-14 PROBLEM — F14.10 COCAINE ABUSE (HCC): Status: ACTIVE | Noted: 2020-05-14

## 2020-05-14 LAB
ALBUMIN SERPL BCP-MCNC: 3.6 G/DL (ref 3.5–5)
ALP SERPL-CCNC: 79 U/L (ref 46–116)
ALT SERPL W P-5'-P-CCNC: 33 U/L (ref 12–78)
AMPHETAMINES SERPL QL SCN: NEGATIVE
ANION GAP SERPL CALCULATED.3IONS-SCNC: 5 MMOL/L (ref 4–13)
APTT PPP: 33 SECONDS (ref 23–37)
AST SERPL W P-5'-P-CCNC: 32 U/L (ref 5–45)
BARBITURATES UR QL: NEGATIVE
BASOPHILS # BLD AUTO: 0.05 THOUSANDS/ΜL (ref 0–0.1)
BASOPHILS NFR BLD AUTO: 1 % (ref 0–1)
BENZODIAZ UR QL: NEGATIVE
BILIRUB SERPL-MCNC: 0.39 MG/DL (ref 0.2–1)
BUN SERPL-MCNC: 17 MG/DL (ref 5–25)
CALCIUM SERPL-MCNC: 9 MG/DL (ref 8.3–10.1)
CHLORIDE SERPL-SCNC: 109 MMOL/L (ref 100–108)
CO2 SERPL-SCNC: 25 MMOL/L (ref 21–32)
COCAINE UR QL: NEGATIVE
CREAT SERPL-MCNC: 1.69 MG/DL (ref 0.6–1.3)
EOSINOPHIL # BLD AUTO: 0.12 THOUSAND/ΜL (ref 0–0.61)
EOSINOPHIL NFR BLD AUTO: 2 % (ref 0–6)
ERYTHROCYTE [DISTWIDTH] IN BLOOD BY AUTOMATED COUNT: 13.4 % (ref 11.6–15.1)
GFR SERPL CREATININE-BSD FRML MDRD: 34 ML/MIN/1.73SQ M
GLUCOSE SERPL-MCNC: 89 MG/DL (ref 65–140)
HCT VFR BLD AUTO: 39.2 % (ref 34.8–46.1)
HGB BLD-MCNC: 12.4 G/DL (ref 11.5–15.4)
IMM GRANULOCYTES # BLD AUTO: 0.01 THOUSAND/UL (ref 0–0.2)
IMM GRANULOCYTES NFR BLD AUTO: 0 % (ref 0–2)
INR PPP: 1.63 (ref 0.84–1.19)
LYMPHOCYTES # BLD AUTO: 1.68 THOUSANDS/ΜL (ref 0.6–4.47)
LYMPHOCYTES NFR BLD AUTO: 25 % (ref 14–44)
MAGNESIUM SERPL-MCNC: 2.2 MG/DL (ref 1.6–2.6)
MCH RBC QN AUTO: 26.7 PG (ref 26.8–34.3)
MCHC RBC AUTO-ENTMCNC: 31.6 G/DL (ref 31.4–37.4)
MCV RBC AUTO: 84 FL (ref 82–98)
METHADONE UR QL: NEGATIVE
MONOCYTES # BLD AUTO: 0.56 THOUSAND/ΜL (ref 0.17–1.22)
MONOCYTES NFR BLD AUTO: 8 % (ref 4–12)
NEUTROPHILS # BLD AUTO: 4.36 THOUSANDS/ΜL (ref 1.85–7.62)
NEUTS SEG NFR BLD AUTO: 64 % (ref 43–75)
NRBC BLD AUTO-RTO: 0 /100 WBCS
OPIATES UR QL SCN: POSITIVE
PCP UR QL: NEGATIVE
PHOSPHATE SERPL-MCNC: 3.8 MG/DL (ref 2.7–4.5)
PLATELET # BLD AUTO: 149 THOUSANDS/UL (ref 149–390)
PMV BLD AUTO: 10.9 FL (ref 8.9–12.7)
POTASSIUM SERPL-SCNC: 4.2 MMOL/L (ref 3.5–5.3)
PROT SERPL-MCNC: 7.6 G/DL (ref 6.4–8.2)
PROTHROMBIN TIME: 18.9 SECONDS (ref 11.6–14.5)
RBC # BLD AUTO: 4.65 MILLION/UL (ref 3.81–5.12)
SARS-COV-2 RNA RESP QL NAA+PROBE: NEGATIVE
SODIUM SERPL-SCNC: 139 MMOL/L (ref 136–145)
THC UR QL: NEGATIVE
TROPONIN I SERPL-MCNC: 0.09 NG/ML
TROPONIN I SERPL-MCNC: 0.29 NG/ML
TROPONIN I SERPL-MCNC: 0.51 NG/ML
TSH SERPL DL<=0.05 MIU/L-ACNC: 1.45 UIU/ML (ref 0.36–3.74)
WBC # BLD AUTO: 6.78 THOUSAND/UL (ref 4.31–10.16)

## 2020-05-14 PROCEDURE — 96365 THER/PROPH/DIAG IV INF INIT: CPT

## 2020-05-14 PROCEDURE — U0003 INFECTIOUS AGENT DETECTION BY NUCLEIC ACID (DNA OR RNA); SEVERE ACUTE RESPIRATORY SYNDROME CORONAVIRUS 2 (SARS-COV-2) (CORONAVIRUS DISEASE [COVID-19]), AMPLIFIED PROBE TECHNIQUE, MAKING USE OF HIGH THROUGHPUT TECHNOLOGIES AS DESCRIBED BY CMS-2020-01-R: HCPCS | Performed by: INTERNAL MEDICINE

## 2020-05-14 PROCEDURE — 85025 COMPLETE CBC W/AUTO DIFF WBC: CPT | Performed by: EMERGENCY MEDICINE

## 2020-05-14 PROCEDURE — 99291 CRITICAL CARE FIRST HOUR: CPT | Performed by: EMERGENCY MEDICINE

## 2020-05-14 PROCEDURE — 84484 ASSAY OF TROPONIN QUANT: CPT | Performed by: EMERGENCY MEDICINE

## 2020-05-14 PROCEDURE — 80053 COMPREHEN METABOLIC PANEL: CPT | Performed by: EMERGENCY MEDICINE

## 2020-05-14 PROCEDURE — 84443 ASSAY THYROID STIM HORMONE: CPT | Performed by: EMERGENCY MEDICINE

## 2020-05-14 PROCEDURE — 85730 THROMBOPLASTIN TIME PARTIAL: CPT | Performed by: EMERGENCY MEDICINE

## 2020-05-14 PROCEDURE — 80307 DRUG TEST PRSMV CHEM ANLYZR: CPT | Performed by: INTERNAL MEDICINE

## 2020-05-14 PROCEDURE — 84484 ASSAY OF TROPONIN QUANT: CPT | Performed by: INTERNAL MEDICINE

## 2020-05-14 PROCEDURE — 99223 1ST HOSP IP/OBS HIGH 75: CPT | Performed by: INTERNAL MEDICINE

## 2020-05-14 PROCEDURE — 36415 COLL VENOUS BLD VENIPUNCTURE: CPT | Performed by: EMERGENCY MEDICINE

## 2020-05-14 PROCEDURE — 93005 ELECTROCARDIOGRAM TRACING: CPT

## 2020-05-14 PROCEDURE — 85610 PROTHROMBIN TIME: CPT | Performed by: EMERGENCY MEDICINE

## 2020-05-14 PROCEDURE — 83735 ASSAY OF MAGNESIUM: CPT | Performed by: EMERGENCY MEDICINE

## 2020-05-14 PROCEDURE — 96376 TX/PRO/DX INJ SAME DRUG ADON: CPT

## 2020-05-14 PROCEDURE — 96366 THER/PROPH/DIAG IV INF ADDON: CPT

## 2020-05-14 PROCEDURE — 84100 ASSAY OF PHOSPHORUS: CPT | Performed by: EMERGENCY MEDICINE

## 2020-05-14 PROCEDURE — 71045 X-RAY EXAM CHEST 1 VIEW: CPT

## 2020-05-14 PROCEDURE — 99285 EMERGENCY DEPT VISIT HI MDM: CPT

## 2020-05-14 RX ORDER — DILTIAZEM HYDROCHLORIDE 5 MG/ML
15 INJECTION INTRAVENOUS ONCE
Status: COMPLETED | OUTPATIENT
Start: 2020-05-14 | End: 2020-05-14

## 2020-05-14 RX ORDER — METOPROLOL TARTRATE 50 MG/1
100 TABLET, FILM COATED ORAL EVERY 12 HOURS SCHEDULED
Status: DISCONTINUED | OUTPATIENT
Start: 2020-05-14 | End: 2020-05-15 | Stop reason: HOSPADM

## 2020-05-14 RX ORDER — ACETAMINOPHEN 325 MG/1
650 TABLET ORAL EVERY 6 HOURS PRN
Status: DISCONTINUED | OUTPATIENT
Start: 2020-05-14 | End: 2020-05-15 | Stop reason: HOSPADM

## 2020-05-14 RX ORDER — NICOTINE 21 MG/24HR
1 PATCH, TRANSDERMAL 24 HOURS TRANSDERMAL DAILY
Status: DISCONTINUED | OUTPATIENT
Start: 2020-05-14 | End: 2020-05-15 | Stop reason: HOSPADM

## 2020-05-14 RX ORDER — PANTOPRAZOLE SODIUM 40 MG/1
40 TABLET, DELAYED RELEASE ORAL
Status: DISCONTINUED | OUTPATIENT
Start: 2020-05-15 | End: 2020-05-15 | Stop reason: HOSPADM

## 2020-05-14 RX ORDER — FUROSEMIDE 20 MG/1
20 TABLET ORAL DAILY
Status: DISCONTINUED | OUTPATIENT
Start: 2020-05-15 | End: 2020-05-15 | Stop reason: HOSPADM

## 2020-05-14 RX ADMIN — DILTIAZEM HYDROCHLORIDE 5 MG/HR: 5 INJECTION INTRAVENOUS at 16:10

## 2020-05-14 RX ADMIN — AMIODARONE HYDROCHLORIDE 150 MG: 50 INJECTION, SOLUTION INTRAVENOUS at 19:36

## 2020-05-14 RX ADMIN — NICOTINE 1 PATCH: 14 PATCH TRANSDERMAL at 20:43

## 2020-05-14 RX ADMIN — AMIODARONE HYDROCHLORIDE 1 MG/MIN: 50 INJECTION, SOLUTION INTRAVENOUS at 19:49

## 2020-05-14 RX ADMIN — DILTIAZEM HYDROCHLORIDE 15 MG: 5 INJECTION INTRAVENOUS at 16:04

## 2020-05-14 RX ADMIN — DILTIAZEM HYDROCHLORIDE 15 MG: 5 INJECTION INTRAVENOUS at 16:34

## 2020-05-14 RX ADMIN — ACETAMINOPHEN 650 MG: 325 TABLET ORAL at 18:18

## 2020-05-14 RX ADMIN — METOPROLOL TARTRATE 100 MG: 50 TABLET, FILM COATED ORAL at 20:43

## 2020-05-15 ENCOUNTER — TELEPHONE (OUTPATIENT)
Dept: CARDIOLOGY CLINIC | Facility: CLINIC | Age: 55
End: 2020-05-15

## 2020-05-15 VITALS
TEMPERATURE: 98.2 F | HEART RATE: 59 BPM | HEIGHT: 68 IN | SYSTOLIC BLOOD PRESSURE: 117 MMHG | BODY MASS INDEX: 33.52 KG/M2 | RESPIRATION RATE: 20 BRPM | DIASTOLIC BLOOD PRESSURE: 70 MMHG | WEIGHT: 221.2 LBS | OXYGEN SATURATION: 97 %

## 2020-05-15 DIAGNOSIS — I48.0 PAROXYSMAL ATRIAL FIBRILLATION (HCC): Primary | ICD-10-CM

## 2020-05-15 LAB
ANION GAP SERPL CALCULATED.3IONS-SCNC: 5 MMOL/L (ref 4–13)
ATRIAL RATE: 202 BPM
BASOPHILS # BLD AUTO: 0.02 THOUSANDS/ΜL (ref 0–0.1)
BASOPHILS NFR BLD AUTO: 1 % (ref 0–1)
BUN SERPL-MCNC: 21 MG/DL (ref 5–25)
CALCIUM SERPL-MCNC: 8.2 MG/DL (ref 8.3–10.1)
CHLORIDE SERPL-SCNC: 106 MMOL/L (ref 100–108)
CO2 SERPL-SCNC: 27 MMOL/L (ref 21–32)
CREAT SERPL-MCNC: 1.76 MG/DL (ref 0.6–1.3)
EOSINOPHIL # BLD AUTO: 0.07 THOUSAND/ΜL (ref 0–0.61)
EOSINOPHIL NFR BLD AUTO: 2 % (ref 0–6)
ERYTHROCYTE [DISTWIDTH] IN BLOOD BY AUTOMATED COUNT: 13.6 % (ref 11.6–15.1)
GFR SERPL CREATININE-BSD FRML MDRD: 32 ML/MIN/1.73SQ M
GLUCOSE SERPL-MCNC: 93 MG/DL (ref 65–140)
HCT VFR BLD AUTO: 35.6 % (ref 34.8–46.1)
HGB BLD-MCNC: 11.1 G/DL (ref 11.5–15.4)
IMM GRANULOCYTES # BLD AUTO: 0.01 THOUSAND/UL (ref 0–0.2)
IMM GRANULOCYTES NFR BLD AUTO: 0 % (ref 0–2)
LYMPHOCYTES # BLD AUTO: 1.44 THOUSANDS/ΜL (ref 0.6–4.47)
LYMPHOCYTES NFR BLD AUTO: 34 % (ref 14–44)
MAGNESIUM SERPL-MCNC: 2.2 MG/DL (ref 1.6–2.6)
MCH RBC QN AUTO: 26.9 PG (ref 26.8–34.3)
MCHC RBC AUTO-ENTMCNC: 31.2 G/DL (ref 31.4–37.4)
MCV RBC AUTO: 86 FL (ref 82–98)
MONOCYTES # BLD AUTO: 0.36 THOUSAND/ΜL (ref 0.17–1.22)
MONOCYTES NFR BLD AUTO: 9 % (ref 4–12)
NEUTROPHILS # BLD AUTO: 2.31 THOUSANDS/ΜL (ref 1.85–7.62)
NEUTS SEG NFR BLD AUTO: 54 % (ref 43–75)
NRBC BLD AUTO-RTO: 0 /100 WBCS
PLATELET # BLD AUTO: 114 THOUSANDS/UL (ref 149–390)
PMV BLD AUTO: 11.4 FL (ref 8.9–12.7)
POTASSIUM SERPL-SCNC: 4.1 MMOL/L (ref 3.5–5.3)
QRS AXIS: -46 DEGREES
QRSD INTERVAL: 138 MS
QT INTERVAL: 332 MS
QTC INTERVAL: 501 MS
RBC # BLD AUTO: 4.12 MILLION/UL (ref 3.81–5.12)
SODIUM SERPL-SCNC: 138 MMOL/L (ref 136–145)
T WAVE AXIS: 121 DEGREES
TROPONIN I SERPL-MCNC: 0.47 NG/ML
VENTRICULAR RATE: 137 BPM
WBC # BLD AUTO: 4.21 THOUSAND/UL (ref 4.31–10.16)

## 2020-05-15 PROCEDURE — 85025 COMPLETE CBC W/AUTO DIFF WBC: CPT | Performed by: INTERNAL MEDICINE

## 2020-05-15 PROCEDURE — 84484 ASSAY OF TROPONIN QUANT: CPT | Performed by: PHYSICIAN ASSISTANT

## 2020-05-15 PROCEDURE — 93010 ELECTROCARDIOGRAM REPORT: CPT | Performed by: INTERNAL MEDICINE

## 2020-05-15 PROCEDURE — 83735 ASSAY OF MAGNESIUM: CPT | Performed by: INTERNAL MEDICINE

## 2020-05-15 PROCEDURE — 99255 IP/OBS CONSLTJ NEW/EST HI 80: CPT | Performed by: INTERNAL MEDICINE

## 2020-05-15 PROCEDURE — 99239 HOSP IP/OBS DSCHRG MGMT >30: CPT | Performed by: INTERNAL MEDICINE

## 2020-05-15 PROCEDURE — 80048 BASIC METABOLIC PNL TOTAL CA: CPT | Performed by: INTERNAL MEDICINE

## 2020-05-15 RX ORDER — NICOTINE 21 MG/24HR
1 PATCH, TRANSDERMAL 24 HOURS TRANSDERMAL DAILY
Qty: 28 PATCH | Refills: 0 | Status: SHIPPED | OUTPATIENT
Start: 2020-05-15 | End: 2020-06-18 | Stop reason: HOSPADM

## 2020-05-15 RX ORDER — AMIODARONE HYDROCHLORIDE 200 MG/1
200 TABLET ORAL
Status: DISCONTINUED | OUTPATIENT
Start: 2020-05-15 | End: 2020-05-15 | Stop reason: HOSPADM

## 2020-05-15 RX ORDER — AMIODARONE HYDROCHLORIDE 200 MG/1
TABLET ORAL
Qty: 60 TABLET | Refills: 0 | Status: ON HOLD | OUTPATIENT
Start: 2020-05-15 | End: 2020-06-18 | Stop reason: SDUPTHER

## 2020-05-15 RX ADMIN — METOPROLOL TARTRATE 100 MG: 50 TABLET, FILM COATED ORAL at 09:18

## 2020-05-15 RX ADMIN — RIVAROXABAN 15 MG: 15 TABLET, FILM COATED ORAL at 10:49

## 2020-05-15 RX ADMIN — FUROSEMIDE 20 MG: 20 TABLET ORAL at 09:18

## 2020-05-15 RX ADMIN — PANTOPRAZOLE SODIUM 40 MG: 40 TABLET, DELAYED RELEASE ORAL at 06:01

## 2020-05-15 RX ADMIN — AMIODARONE HYDROCHLORIDE 200 MG: 200 TABLET ORAL at 11:04

## 2020-05-19 ENCOUNTER — TELEPHONE (OUTPATIENT)
Dept: INPATIENT UNIT | Facility: HOSPITAL | Age: 55
End: 2020-05-19

## 2020-05-19 RX ORDER — PANTOPRAZOLE SODIUM 40 MG/1
40 INJECTION, POWDER, FOR SOLUTION INTRAVENOUS ONCE
Status: CANCELLED | OUTPATIENT
Start: 2020-05-19 | End: 2020-05-19

## 2020-05-20 ENCOUNTER — ANESTHESIA (OUTPATIENT)
Dept: NON INVASIVE DIAGNOSTICS | Facility: HOSPITAL | Age: 55
End: 2020-05-20
Payer: COMMERCIAL

## 2020-05-20 ENCOUNTER — HOSPITAL ENCOUNTER (OUTPATIENT)
Dept: NON INVASIVE DIAGNOSTICS | Facility: HOSPITAL | Age: 55
Discharge: HOME | End: 2020-05-21
Attending: INTERNAL MEDICINE | Admitting: INTERNAL MEDICINE
Payer: COMMERCIAL

## 2020-05-20 ENCOUNTER — ANESTHESIA EVENT (OUTPATIENT)
Dept: NON INVASIVE DIAGNOSTICS | Facility: HOSPITAL | Age: 55
End: 2020-05-20
Payer: COMMERCIAL

## 2020-05-20 ENCOUNTER — APPOINTMENT (OUTPATIENT)
Dept: NON INVASIVE DIAGNOSTICS | Facility: HOSPITAL | Age: 55
End: 2020-05-20
Attending: INTERNAL MEDICINE
Payer: COMMERCIAL

## 2020-05-20 DIAGNOSIS — I48.0 PAROXYSMAL ATRIAL FIBRILLATION (HCC): ICD-10-CM

## 2020-05-20 LAB
ANION GAP SERPL CALCULATED.3IONS-SCNC: 5 MMOL/L (ref 4–13)
ATRIAL RATE: 60 BPM
ATRIAL RATE: 60 BPM
BASOPHILS # BLD AUTO: 0.01 THOUSANDS/ΜL (ref 0–0.1)
BASOPHILS NFR BLD AUTO: 0 % (ref 0–1)
BUN SERPL-MCNC: 12 MG/DL (ref 5–25)
CALCIUM SERPL-MCNC: 9.2 MG/DL (ref 8.3–10.1)
CHLORIDE SERPL-SCNC: 110 MMOL/L (ref 100–108)
CO2 SERPL-SCNC: 26 MMOL/L (ref 21–32)
CREAT SERPL-MCNC: 1.44 MG/DL (ref 0.6–1.3)
EOSINOPHIL # BLD AUTO: 0.03 THOUSAND/ΜL (ref 0–0.61)
EOSINOPHIL NFR BLD AUTO: 1 % (ref 0–6)
ERYTHROCYTE [DISTWIDTH] IN BLOOD BY AUTOMATED COUNT: 13.5 % (ref 11.6–15.1)
ERYTHROCYTE [DISTWIDTH] IN BLOOD BY AUTOMATED COUNT: 13.5 % (ref 11.6–15.1)
GFR SERPL CREATININE-BSD FRML MDRD: 41 ML/MIN/1.73SQ M
GLUCOSE P FAST SERPL-MCNC: 89 MG/DL (ref 65–99)
GLUCOSE SERPL-MCNC: 89 MG/DL (ref 65–140)
HCT VFR BLD AUTO: 36.9 % (ref 34.8–46.1)
HCT VFR BLD AUTO: 37.7 % (ref 34.8–46.1)
HGB BLD-MCNC: 11.5 G/DL (ref 11.5–15.4)
HGB BLD-MCNC: 11.9 G/DL (ref 11.5–15.4)
IMM GRANULOCYTES # BLD AUTO: 0.02 THOUSAND/UL (ref 0–0.2)
IMM GRANULOCYTES NFR BLD AUTO: 1 % (ref 0–2)
INR PPP: 1.1 (ref 0.84–1.19)
KCT BLD-ACNC: 132 SEC (ref 89–137)
KCT BLD-ACNC: 249 SEC (ref 89–137)
KCT BLD-ACNC: 351 SEC (ref 89–137)
KCT BLD-ACNC: 364 SEC (ref 89–137)
LYMPHOCYTES # BLD AUTO: 1 THOUSANDS/ΜL (ref 0.6–4.47)
LYMPHOCYTES NFR BLD AUTO: 25 % (ref 14–44)
MCH RBC QN AUTO: 26.7 PG (ref 26.8–34.3)
MCH RBC QN AUTO: 27.4 PG (ref 26.8–34.3)
MCHC RBC AUTO-ENTMCNC: 31.2 G/DL (ref 31.4–37.4)
MCHC RBC AUTO-ENTMCNC: 31.6 G/DL (ref 31.4–37.4)
MCV RBC AUTO: 85 FL (ref 82–98)
MCV RBC AUTO: 88 FL (ref 82–98)
MONOCYTES # BLD AUTO: 0.24 THOUSAND/ΜL (ref 0.17–1.22)
MONOCYTES NFR BLD AUTO: 6 % (ref 4–12)
NEUTROPHILS # BLD AUTO: 2.68 THOUSANDS/ΜL (ref 1.85–7.62)
NEUTS SEG NFR BLD AUTO: 67 % (ref 43–75)
NRBC BLD AUTO-RTO: 0 /100 WBCS
P AXIS: 66 DEGREES
P AXIS: 83 DEGREES
PLATELET # BLD AUTO: 112 THOUSANDS/UL (ref 149–390)
PLATELET # BLD AUTO: 125 THOUSANDS/UL (ref 149–390)
PMV BLD AUTO: 11.5 FL (ref 8.9–12.7)
PMV BLD AUTO: 11.6 FL (ref 8.9–12.7)
POTASSIUM SERPL-SCNC: 4 MMOL/L (ref 3.5–5.3)
PR INTERVAL: 164 MS
PR INTERVAL: 184 MS
PROTHROMBIN TIME: 13.8 SECONDS (ref 11.6–14.5)
QRS AXIS: -41 DEGREES
QRS AXIS: -44 DEGREES
QRSD INTERVAL: 152 MS
QRSD INTERVAL: 172 MS
QT INTERVAL: 486 MS
QT INTERVAL: 522 MS
QTC INTERVAL: 486 MS
QTC INTERVAL: 522 MS
RBC # BLD AUTO: 4.2 MILLION/UL (ref 3.81–5.12)
RBC # BLD AUTO: 4.45 MILLION/UL (ref 3.81–5.12)
SODIUM SERPL-SCNC: 141 MMOL/L (ref 136–145)
SPECIMEN SOURCE: ABNORMAL
SPECIMEN SOURCE: NORMAL
T WAVE AXIS: 117 DEGREES
T WAVE AXIS: 138 DEGREES
VENTRICULAR RATE: 60 BPM
VENTRICULAR RATE: 60 BPM
WBC # BLD AUTO: 3.98 THOUSAND/UL (ref 4.31–10.16)
WBC # BLD AUTO: 7.42 THOUSAND/UL (ref 4.31–10.16)

## 2020-05-20 PROCEDURE — C1893 INTRO/SHEATH, FIXED,NON-PEEL: HCPCS | Performed by: INTERNAL MEDICINE

## 2020-05-20 PROCEDURE — C9113 INJ PANTOPRAZOLE SODIUM, VIA: HCPCS | Performed by: PHYSICIAN ASSISTANT

## 2020-05-20 PROCEDURE — 85610 PROTHROMBIN TIME: CPT | Performed by: PHYSICIAN ASSISTANT

## 2020-05-20 PROCEDURE — 93005 ELECTROCARDIOGRAM TRACING: CPT

## 2020-05-20 PROCEDURE — 93312 ECHO TRANSESOPHAGEAL: CPT

## 2020-05-20 PROCEDURE — 76937 US GUIDE VASCULAR ACCESS: CPT | Performed by: INTERNAL MEDICINE

## 2020-05-20 PROCEDURE — 85347 COAGULATION TIME ACTIVATED: CPT

## 2020-05-20 PROCEDURE — C1769 GUIDE WIRE: HCPCS | Performed by: INTERNAL MEDICINE

## 2020-05-20 PROCEDURE — C1894 INTRO/SHEATH, NON-LASER: HCPCS | Performed by: INTERNAL MEDICINE

## 2020-05-20 PROCEDURE — 93656 COMPRE EP EVAL ABLTJ ATR FIB: CPT | Performed by: INTERNAL MEDICINE

## 2020-05-20 PROCEDURE — 80048 BASIC METABOLIC PNL TOTAL CA: CPT | Performed by: PHYSICIAN ASSISTANT

## 2020-05-20 PROCEDURE — C1730 CATH, EP, 19 OR FEW ELECT: HCPCS | Performed by: INTERNAL MEDICINE

## 2020-05-20 PROCEDURE — 93613 INTRACARDIAC EPHYS 3D MAPG: CPT | Performed by: INTERNAL MEDICINE

## 2020-05-20 PROCEDURE — C1759 CATH, INTRA ECHOCARDIOGRAPHY: HCPCS | Performed by: INTERNAL MEDICINE

## 2020-05-20 PROCEDURE — 93662 INTRACARDIAC ECG (ICE): CPT | Performed by: INTERNAL MEDICINE

## 2020-05-20 PROCEDURE — C1733 CATH, EP, OTHR THAN COOL-TIP: HCPCS | Performed by: INTERNAL MEDICINE

## 2020-05-20 PROCEDURE — 93010 ELECTROCARDIOGRAM REPORT: CPT | Performed by: INTERNAL MEDICINE

## 2020-05-20 PROCEDURE — 85025 COMPLETE CBC W/AUTO DIFF WBC: CPT | Performed by: PHYSICIAN ASSISTANT

## 2020-05-20 PROCEDURE — 85027 COMPLETE CBC AUTOMATED: CPT | Performed by: STUDENT IN AN ORGANIZED HEALTH CARE EDUCATION/TRAINING PROGRAM

## 2020-05-20 RX ORDER — SODIUM CHLORIDE 9 MG/ML
75 INJECTION, SOLUTION INTRAVENOUS CONTINUOUS
Status: CANCELLED | OUTPATIENT
Start: 2020-05-20

## 2020-05-20 RX ORDER — CYCLOBENZAPRINE HCL 10 MG
10 TABLET ORAL ONCE
Status: COMPLETED | OUTPATIENT
Start: 2020-05-20 | End: 2020-05-20

## 2020-05-20 RX ORDER — CEFAZOLIN SODIUM 2 G/50ML
SOLUTION INTRAVENOUS AS NEEDED
Status: DISCONTINUED | OUTPATIENT
Start: 2020-05-20 | End: 2020-05-20 | Stop reason: SURG

## 2020-05-20 RX ORDER — PROTAMINE SULFATE 10 MG/ML
INJECTION, SOLUTION INTRAVENOUS AS NEEDED
Status: DISCONTINUED | OUTPATIENT
Start: 2020-05-20 | End: 2020-05-20 | Stop reason: SURG

## 2020-05-20 RX ORDER — AMIODARONE HYDROCHLORIDE 200 MG/1
200 TABLET ORAL
Status: DISCONTINUED | OUTPATIENT
Start: 2020-05-20 | End: 2020-05-21 | Stop reason: HOSPADM

## 2020-05-20 RX ORDER — GLYCOPYRROLATE 0.2 MG/ML
INJECTION INTRAMUSCULAR; INTRAVENOUS AS NEEDED
Status: DISCONTINUED | OUTPATIENT
Start: 2020-05-20 | End: 2020-05-20 | Stop reason: SURG

## 2020-05-20 RX ORDER — LABETALOL 20 MG/4 ML (5 MG/ML) INTRAVENOUS SYRINGE
AS NEEDED
Status: DISCONTINUED | OUTPATIENT
Start: 2020-05-20 | End: 2020-05-20 | Stop reason: SURG

## 2020-05-20 RX ORDER — METOPROLOL TARTRATE 50 MG/1
100 TABLET, FILM COATED ORAL EVERY 12 HOURS SCHEDULED
Status: DISCONTINUED | OUTPATIENT
Start: 2020-05-20 | End: 2020-05-21 | Stop reason: HOSPADM

## 2020-05-20 RX ORDER — ONDANSETRON 2 MG/ML
4 INJECTION INTRAMUSCULAR; INTRAVENOUS ONCE AS NEEDED
Status: CANCELLED | OUTPATIENT
Start: 2020-05-20

## 2020-05-20 RX ORDER — DIPHENHYDRAMINE HYDROCHLORIDE 50 MG/ML
INJECTION INTRAMUSCULAR; INTRAVENOUS AS NEEDED
Status: DISCONTINUED | OUTPATIENT
Start: 2020-05-20 | End: 2020-05-20 | Stop reason: SURG

## 2020-05-20 RX ORDER — PANTOPRAZOLE SODIUM 40 MG/1
40 TABLET, DELAYED RELEASE ORAL DAILY
Status: DISCONTINUED | OUTPATIENT
Start: 2020-05-20 | End: 2020-05-20

## 2020-05-20 RX ORDER — HEPARIN SODIUM 1000 [USP'U]/ML
INJECTION, SOLUTION INTRAVENOUS; SUBCUTANEOUS CODE/TRAUMA/SEDATION MEDICATION
Status: COMPLETED | OUTPATIENT
Start: 2020-05-20 | End: 2020-05-20

## 2020-05-20 RX ORDER — HYDROMORPHONE HCL/PF 1 MG/ML
0.5 SYRINGE (ML) INJECTION
Status: CANCELLED | OUTPATIENT
Start: 2020-05-20

## 2020-05-20 RX ORDER — OXYCODONE HYDROCHLORIDE 10 MG/1
10 TABLET ORAL EVERY 6 HOURS PRN
Status: COMPLETED | OUTPATIENT
Start: 2020-05-21 | End: 2020-05-21

## 2020-05-20 RX ORDER — FENTANYL CITRATE 50 UG/ML
INJECTION, SOLUTION INTRAMUSCULAR; INTRAVENOUS AS NEEDED
Status: DISCONTINUED | OUTPATIENT
Start: 2020-05-20 | End: 2020-05-20 | Stop reason: SURG

## 2020-05-20 RX ORDER — METHYLPREDNISOLONE SODIUM SUCCINATE 125 MG/2ML
INJECTION, POWDER, LYOPHILIZED, FOR SOLUTION INTRAMUSCULAR; INTRAVENOUS AS NEEDED
Status: DISCONTINUED | OUTPATIENT
Start: 2020-05-20 | End: 2020-05-20 | Stop reason: SURG

## 2020-05-20 RX ORDER — DIPHENHYDRAMINE HYDROCHLORIDE 50 MG/ML
25 INJECTION INTRAMUSCULAR; INTRAVENOUS ONCE
Status: DISCONTINUED | OUTPATIENT
Start: 2020-05-20 | End: 2020-05-21

## 2020-05-20 RX ORDER — SUCCINYLCHOLINE/SOD CL,ISO/PF 100 MG/5ML
SYRINGE (ML) INTRAVENOUS AS NEEDED
Status: DISCONTINUED | OUTPATIENT
Start: 2020-05-20 | End: 2020-05-20 | Stop reason: SURG

## 2020-05-20 RX ORDER — ACETAMINOPHEN 325 MG/1
650 TABLET ORAL EVERY 4 HOURS PRN
Status: DISCONTINUED | OUTPATIENT
Start: 2020-05-20 | End: 2020-05-21 | Stop reason: HOSPADM

## 2020-05-20 RX ORDER — HEPARIN SODIUM 10000 [USP'U]/100ML
INJECTION, SOLUTION INTRAVENOUS
Status: COMPLETED | OUTPATIENT
Start: 2020-05-20 | End: 2020-05-20

## 2020-05-20 RX ORDER — HYDRALAZINE HYDROCHLORIDE 20 MG/ML
INJECTION INTRAMUSCULAR; INTRAVENOUS AS NEEDED
Status: DISCONTINUED | OUTPATIENT
Start: 2020-05-20 | End: 2020-05-20 | Stop reason: SURG

## 2020-05-20 RX ORDER — LISINOPRIL 10 MG/1
10 TABLET ORAL DAILY
COMMUNITY
End: 2020-09-18 | Stop reason: HOSPADM

## 2020-05-20 RX ORDER — MIDAZOLAM HYDROCHLORIDE 2 MG/2ML
INJECTION, SOLUTION INTRAMUSCULAR; INTRAVENOUS AS NEEDED
Status: DISCONTINUED | OUTPATIENT
Start: 2020-05-20 | End: 2020-05-20 | Stop reason: SURG

## 2020-05-20 RX ORDER — NICOTINE 21 MG/24HR
1 PATCH, TRANSDERMAL 24 HOURS TRANSDERMAL DAILY
Status: DISCONTINUED | OUTPATIENT
Start: 2020-05-21 | End: 2020-05-21 | Stop reason: HOSPADM

## 2020-05-20 RX ORDER — OXYCODONE HYDROCHLORIDE 10 MG/1
10 TABLET ORAL ONCE
Status: COMPLETED | OUTPATIENT
Start: 2020-05-20 | End: 2020-05-20

## 2020-05-20 RX ORDER — PANTOPRAZOLE SODIUM 40 MG/1
40 INJECTION, POWDER, FOR SOLUTION INTRAVENOUS ONCE
Status: COMPLETED | OUTPATIENT
Start: 2020-05-20 | End: 2020-05-20

## 2020-05-20 RX ORDER — LISINOPRIL 10 MG/1
10 TABLET ORAL ONCE
Status: COMPLETED | OUTPATIENT
Start: 2020-05-20 | End: 2020-05-20

## 2020-05-20 RX ORDER — FENOPROFEN CALCIUM 400 MG/1
400 CAPSULE ORAL 3 TIMES DAILY
COMMUNITY
End: 2020-05-26 | Stop reason: HOSPADM

## 2020-05-20 RX ORDER — PROPOFOL 10 MG/ML
INJECTION, EMULSION INTRAVENOUS AS NEEDED
Status: DISCONTINUED | OUTPATIENT
Start: 2020-05-20 | End: 2020-05-20 | Stop reason: SURG

## 2020-05-20 RX ORDER — LISINOPRIL 10 MG/1
10 TABLET ORAL DAILY
Status: DISCONTINUED | OUTPATIENT
Start: 2020-05-21 | End: 2020-05-21 | Stop reason: HOSPADM

## 2020-05-20 RX ORDER — DEXAMETHASONE SODIUM PHOSPHATE 10 MG/ML
INJECTION, SOLUTION INTRAMUSCULAR; INTRAVENOUS AS NEEDED
Status: DISCONTINUED | OUTPATIENT
Start: 2020-05-20 | End: 2020-05-20 | Stop reason: SURG

## 2020-05-20 RX ORDER — SODIUM CHLORIDE 9 MG/ML
INJECTION, SOLUTION INTRAVENOUS CONTINUOUS PRN
Status: DISCONTINUED | OUTPATIENT
Start: 2020-05-20 | End: 2020-05-20 | Stop reason: SURG

## 2020-05-20 RX ORDER — PANTOPRAZOLE SODIUM 40 MG/1
40 TABLET, DELAYED RELEASE ORAL
Status: DISCONTINUED | OUTPATIENT
Start: 2020-05-21 | End: 2020-05-21 | Stop reason: HOSPADM

## 2020-05-20 RX ORDER — METOPROLOL TARTRATE 50 MG/1
100 TABLET, FILM COATED ORAL ONCE
Status: COMPLETED | OUTPATIENT
Start: 2020-05-20 | End: 2020-05-20

## 2020-05-20 RX ORDER — ONDANSETRON 2 MG/ML
INJECTION INTRAMUSCULAR; INTRAVENOUS AS NEEDED
Status: DISCONTINUED | OUTPATIENT
Start: 2020-05-20 | End: 2020-05-20 | Stop reason: SURG

## 2020-05-20 RX ADMIN — METOPROLOL TARTRATE 100 MG: 50 TABLET, FILM COATED ORAL at 19:36

## 2020-05-20 RX ADMIN — SODIUM CHLORIDE: 0.9 INJECTION, SOLUTION INTRAVENOUS at 14:04

## 2020-05-20 RX ADMIN — LABETALOL 20 MG/4 ML (5 MG/ML) INTRAVENOUS SYRINGE 5 MG: at 16:16

## 2020-05-20 RX ADMIN — AMIODARONE HYDROCHLORIDE 200 MG: 200 TABLET ORAL at 17:52

## 2020-05-20 RX ADMIN — HYDRALAZINE HYDROCHLORIDE 5 MG: 20 INJECTION INTRAMUSCULAR; INTRAVENOUS at 16:20

## 2020-05-20 RX ADMIN — DEXAMETHASONE SODIUM PHOSPHATE 10 MG: 10 INJECTION, SOLUTION INTRAMUSCULAR; INTRAVENOUS at 16:11

## 2020-05-20 RX ADMIN — CEFAZOLIN SODIUM 2000 MG: 2 SOLUTION INTRAVENOUS at 14:40

## 2020-05-20 RX ADMIN — ACETAMINOPHEN 650 MG: 325 TABLET ORAL at 22:09

## 2020-05-20 RX ADMIN — Medication 100 MG: at 14:18

## 2020-05-20 RX ADMIN — PROTAMINE SULFATE 50 MG: 10 INJECTION, SOLUTION INTRAVENOUS at 16:02

## 2020-05-20 RX ADMIN — LABETALOL 20 MG/4 ML (5 MG/ML) INTRAVENOUS SYRINGE 5 MG: at 16:09

## 2020-05-20 RX ADMIN — HEPARIN SODIUM 7000 UNITS: 1000 INJECTION INTRAVENOUS; SUBCUTANEOUS at 15:13

## 2020-05-20 RX ADMIN — LISINOPRIL 10 MG: 10 TABLET ORAL at 19:36

## 2020-05-20 RX ADMIN — IOHEXOL 30 ML: 350 INJECTION, SOLUTION INTRAVENOUS at 16:19

## 2020-05-20 RX ADMIN — RIVAROXABAN 20 MG: 20 TABLET, FILM COATED ORAL at 17:52

## 2020-05-20 RX ADMIN — OXYCODONE HYDROCHLORIDE 10 MG: 10 TABLET ORAL at 17:52

## 2020-05-20 RX ADMIN — PANTOPRAZOLE SODIUM 40 MG: 40 INJECTION, POWDER, FOR SOLUTION INTRAVENOUS at 14:23

## 2020-05-20 RX ADMIN — HEPARIN SODIUM 10000 UNITS: 1000 INJECTION INTRAVENOUS; SUBCUTANEOUS at 14:56

## 2020-05-20 RX ADMIN — METHYLPREDNISOLONE SODIUM SUCCINATE 100 MG: 125 INJECTION, POWDER, FOR SOLUTION INTRAMUSCULAR; INTRAVENOUS at 14:42

## 2020-05-20 RX ADMIN — ACETAMINOPHEN 650 MG: 325 TABLET ORAL at 17:12

## 2020-05-20 RX ADMIN — FAMOTIDINE 20 MG: 10 INJECTION, SOLUTION INTRAVENOUS at 14:38

## 2020-05-20 RX ADMIN — ONDANSETRON 4 MG: 2 INJECTION INTRAMUSCULAR; INTRAVENOUS at 15:52

## 2020-05-20 RX ADMIN — CYCLOBENZAPRINE HYDROCHLORIDE 10 MG: 10 TABLET, FILM COATED ORAL at 20:31

## 2020-05-20 RX ADMIN — FENTANYL CITRATE 100 MCG: 50 INJECTION, SOLUTION INTRAMUSCULAR; INTRAVENOUS at 14:18

## 2020-05-20 RX ADMIN — MIDAZOLAM 2 MG: 1 INJECTION INTRAMUSCULAR; INTRAVENOUS at 14:04

## 2020-05-20 RX ADMIN — DIPHENHYDRAMINE HYDROCHLORIDE 25 MG: 50 INJECTION INTRAMUSCULAR; INTRAVENOUS at 14:38

## 2020-05-20 RX ADMIN — PROPOFOL 200 MG: 10 INJECTION, EMULSION INTRAVENOUS at 14:18

## 2020-05-20 RX ADMIN — HEPARIN SODIUM AND DEXTROSE 3500 UNITS/HR: 10000; 5 INJECTION INTRAVENOUS at 14:56

## 2020-05-20 RX ADMIN — GLYCOPYRROLATE 0.1 MG: 0.2 INJECTION, SOLUTION INTRAMUSCULAR; INTRAVENOUS at 16:21

## 2020-05-21 VITALS
WEIGHT: 202 LBS | RESPIRATION RATE: 18 BRPM | HEART RATE: 60 BPM | DIASTOLIC BLOOD PRESSURE: 91 MMHG | OXYGEN SATURATION: 94 % | BODY MASS INDEX: 31.71 KG/M2 | HEIGHT: 67 IN | TEMPERATURE: 98.4 F | SYSTOLIC BLOOD PRESSURE: 179 MMHG

## 2020-05-21 LAB
ANION GAP SERPL CALCULATED.3IONS-SCNC: 5 MMOL/L (ref 4–13)
BUN SERPL-MCNC: 15 MG/DL (ref 5–25)
CALCIUM SERPL-MCNC: 8.6 MG/DL (ref 8.3–10.1)
CHLORIDE SERPL-SCNC: 107 MMOL/L (ref 100–108)
CO2 SERPL-SCNC: 24 MMOL/L (ref 21–32)
CREAT SERPL-MCNC: 1.5 MG/DL (ref 0.6–1.3)
ERYTHROCYTE [DISTWIDTH] IN BLOOD BY AUTOMATED COUNT: 13.5 % (ref 11.6–15.1)
ERYTHROCYTE [DISTWIDTH] IN BLOOD BY AUTOMATED COUNT: 13.5 % (ref 11.6–15.1)
GFR SERPL CREATININE-BSD FRML MDRD: 39 ML/MIN/1.73SQ M
GLUCOSE P FAST SERPL-MCNC: 141 MG/DL (ref 65–99)
GLUCOSE SERPL-MCNC: 141 MG/DL (ref 65–140)
HCT VFR BLD AUTO: 33.7 % (ref 34.8–46.1)
HCT VFR BLD AUTO: 34.9 % (ref 34.8–46.1)
HGB BLD-MCNC: 10.5 G/DL (ref 11.5–15.4)
HGB BLD-MCNC: 11.2 G/DL (ref 11.5–15.4)
MCH RBC QN AUTO: 26.6 PG (ref 26.8–34.3)
MCH RBC QN AUTO: 27.4 PG (ref 26.8–34.3)
MCHC RBC AUTO-ENTMCNC: 31.2 G/DL (ref 31.4–37.4)
MCHC RBC AUTO-ENTMCNC: 32.1 G/DL (ref 31.4–37.4)
MCV RBC AUTO: 85 FL (ref 82–98)
MCV RBC AUTO: 85 FL (ref 82–98)
PLATELET # BLD AUTO: 113 THOUSANDS/UL (ref 149–390)
PLATELET # BLD AUTO: 129 THOUSANDS/UL (ref 149–390)
PMV BLD AUTO: 11.5 FL (ref 8.9–12.7)
PMV BLD AUTO: 11.9 FL (ref 8.9–12.7)
POTASSIUM SERPL-SCNC: 4.3 MMOL/L (ref 3.5–5.3)
RBC # BLD AUTO: 3.95 MILLION/UL (ref 3.81–5.12)
RBC # BLD AUTO: 4.09 MILLION/UL (ref 3.81–5.12)
SODIUM SERPL-SCNC: 136 MMOL/L (ref 136–145)
WBC # BLD AUTO: 10.36 THOUSAND/UL (ref 4.31–10.16)
WBC # BLD AUTO: 8.26 THOUSAND/UL (ref 4.31–10.16)

## 2020-05-21 PROCEDURE — 80048 BASIC METABOLIC PNL TOTAL CA: CPT | Performed by: PHYSICIAN ASSISTANT

## 2020-05-21 PROCEDURE — 85027 COMPLETE CBC AUTOMATED: CPT | Performed by: STUDENT IN AN ORGANIZED HEALTH CARE EDUCATION/TRAINING PROGRAM

## 2020-05-21 PROCEDURE — 85027 COMPLETE CBC AUTOMATED: CPT | Performed by: PHYSICIAN ASSISTANT

## 2020-05-21 PROCEDURE — NC001 PR NO CHARGE: Performed by: INTERNAL MEDICINE

## 2020-05-21 RX ADMIN — METOPROLOL TARTRATE 100 MG: 50 TABLET, FILM COATED ORAL at 08:51

## 2020-05-21 RX ADMIN — OXYCODONE HYDROCHLORIDE 10 MG: 10 TABLET ORAL at 06:32

## 2020-05-21 RX ADMIN — NICOTINE 1 PATCH: 14 PATCH TRANSDERMAL at 08:51

## 2020-05-21 RX ADMIN — LISINOPRIL 10 MG: 10 TABLET ORAL at 08:51

## 2020-05-21 RX ADMIN — OXYCODONE HYDROCHLORIDE 10 MG: 10 TABLET ORAL at 00:13

## 2020-05-21 RX ADMIN — PANTOPRAZOLE SODIUM 40 MG: 40 TABLET, DELAYED RELEASE ORAL at 06:06

## 2020-05-21 RX ADMIN — AMIODARONE HYDROCHLORIDE 200 MG: 200 TABLET ORAL at 08:51

## 2020-05-22 PROCEDURE — 93325 DOPPLER ECHO COLOR FLOW MAPG: CPT | Performed by: INTERNAL MEDICINE

## 2020-05-22 PROCEDURE — 93321 DOPPLER ECHO F-UP/LMTD STD: CPT | Performed by: INTERNAL MEDICINE

## 2020-05-22 PROCEDURE — 93312 ECHO TRANSESOPHAGEAL: CPT | Performed by: INTERNAL MEDICINE

## 2020-05-24 ENCOUNTER — HOSPITAL ENCOUNTER (EMERGENCY)
Facility: HOSPITAL | Age: 55
End: 2020-05-25
Attending: EMERGENCY MEDICINE | Admitting: EMERGENCY MEDICINE
Payer: COMMERCIAL

## 2020-05-24 DIAGNOSIS — T81.718A PSEUDOANEURYSM FOLLOWING PROCEDURE (HCC): Primary | ICD-10-CM

## 2020-05-24 DIAGNOSIS — I72.9 PSEUDOANEURYSM FOLLOWING PROCEDURE (HCC): Primary | ICD-10-CM

## 2020-05-24 LAB
ANION GAP SERPL CALCULATED.3IONS-SCNC: 6 MMOL/L (ref 4–13)
APTT PPP: 35 SECONDS (ref 23–37)
BASOPHILS # BLD AUTO: 0.04 THOUSANDS/ΜL (ref 0–0.1)
BASOPHILS NFR BLD AUTO: 1 % (ref 0–1)
BUN SERPL-MCNC: 24 MG/DL (ref 5–25)
CALCIUM SERPL-MCNC: 8.4 MG/DL (ref 8.3–10.1)
CHLORIDE SERPL-SCNC: 105 MMOL/L (ref 100–108)
CO2 SERPL-SCNC: 30 MMOL/L (ref 21–32)
CREAT SERPL-MCNC: 1.83 MG/DL (ref 0.6–1.3)
EOSINOPHIL # BLD AUTO: 0.17 THOUSAND/ΜL (ref 0–0.61)
EOSINOPHIL NFR BLD AUTO: 2 % (ref 0–6)
ERYTHROCYTE [DISTWIDTH] IN BLOOD BY AUTOMATED COUNT: 14.2 % (ref 11.6–15.1)
GFR SERPL CREATININE-BSD FRML MDRD: 31 ML/MIN/1.73SQ M
GLUCOSE SERPL-MCNC: 100 MG/DL (ref 65–140)
HCT VFR BLD AUTO: 36.9 % (ref 34.8–46.1)
HGB BLD-MCNC: 11.4 G/DL (ref 11.5–15.4)
IMM GRANULOCYTES # BLD AUTO: 0.06 THOUSAND/UL (ref 0–0.2)
IMM GRANULOCYTES NFR BLD AUTO: 1 % (ref 0–2)
INR PPP: 1.63 (ref 0.84–1.19)
LYMPHOCYTES # BLD AUTO: 1.85 THOUSANDS/ΜL (ref 0.6–4.47)
LYMPHOCYTES NFR BLD AUTO: 24 % (ref 14–44)
MCH RBC QN AUTO: 26.9 PG (ref 26.8–34.3)
MCHC RBC AUTO-ENTMCNC: 30.9 G/DL (ref 31.4–37.4)
MCV RBC AUTO: 87 FL (ref 82–98)
MONOCYTES # BLD AUTO: 0.57 THOUSAND/ΜL (ref 0.17–1.22)
MONOCYTES NFR BLD AUTO: 7 % (ref 4–12)
NEUTROPHILS # BLD AUTO: 5.02 THOUSANDS/ΜL (ref 1.85–7.62)
NEUTS SEG NFR BLD AUTO: 65 % (ref 43–75)
NRBC BLD AUTO-RTO: 0 /100 WBCS
PLATELET # BLD AUTO: 145 THOUSANDS/UL (ref 149–390)
PMV BLD AUTO: 11.4 FL (ref 8.9–12.7)
POTASSIUM SERPL-SCNC: 4.4 MMOL/L (ref 3.5–5.3)
PROTHROMBIN TIME: 18.6 SECONDS (ref 11.6–14.5)
RBC # BLD AUTO: 4.24 MILLION/UL (ref 3.81–5.12)
SODIUM SERPL-SCNC: 141 MMOL/L (ref 136–145)
WBC # BLD AUTO: 7.71 THOUSAND/UL (ref 4.31–10.16)

## 2020-05-24 PROCEDURE — 99285 EMERGENCY DEPT VISIT HI MDM: CPT

## 2020-05-24 PROCEDURE — 36415 COLL VENOUS BLD VENIPUNCTURE: CPT | Performed by: EMERGENCY MEDICINE

## 2020-05-24 PROCEDURE — 85610 PROTHROMBIN TIME: CPT | Performed by: EMERGENCY MEDICINE

## 2020-05-24 PROCEDURE — 80048 BASIC METABOLIC PNL TOTAL CA: CPT | Performed by: EMERGENCY MEDICINE

## 2020-05-24 PROCEDURE — 85730 THROMBOPLASTIN TIME PARTIAL: CPT | Performed by: EMERGENCY MEDICINE

## 2020-05-24 PROCEDURE — 99285 EMERGENCY DEPT VISIT HI MDM: CPT | Performed by: EMERGENCY MEDICINE

## 2020-05-24 PROCEDURE — 85025 COMPLETE CBC W/AUTO DIFF WBC: CPT | Performed by: EMERGENCY MEDICINE

## 2020-05-24 PROCEDURE — 93005 ELECTROCARDIOGRAM TRACING: CPT

## 2020-05-25 ENCOUNTER — APPOINTMENT (EMERGENCY)
Dept: ULTRASOUND IMAGING | Facility: HOSPITAL | Age: 55
End: 2020-05-25
Payer: COMMERCIAL

## 2020-05-25 ENCOUNTER — APPOINTMENT (EMERGENCY)
Dept: CT IMAGING | Facility: HOSPITAL | Age: 55
End: 2020-05-25
Payer: COMMERCIAL

## 2020-05-25 ENCOUNTER — APPOINTMENT (EMERGENCY)
Dept: RADIOLOGY | Facility: HOSPITAL | Age: 55
End: 2020-05-25
Payer: COMMERCIAL

## 2020-05-25 ENCOUNTER — HOSPITAL ENCOUNTER (INPATIENT)
Facility: HOSPITAL | Age: 55
LOS: 1 days | Discharge: HOME | DRG: 182 | End: 2020-05-26
Attending: INTERNAL MEDICINE | Admitting: INTERNAL MEDICINE
Payer: COMMERCIAL

## 2020-05-25 ENCOUNTER — APPOINTMENT (INPATIENT)
Dept: NON INVASIVE DIAGNOSTICS | Facility: HOSPITAL | Age: 55
DRG: 182 | End: 2020-05-25
Payer: COMMERCIAL

## 2020-05-25 VITALS
DIASTOLIC BLOOD PRESSURE: 82 MMHG | BODY MASS INDEX: 36.74 KG/M2 | RESPIRATION RATE: 15 BRPM | WEIGHT: 234.57 LBS | TEMPERATURE: 98.3 F | SYSTOLIC BLOOD PRESSURE: 147 MMHG | OXYGEN SATURATION: 96 % | HEART RATE: 59 BPM

## 2020-05-25 DIAGNOSIS — I72.9 PSEUDOANEURYSM (HCC): Primary | ICD-10-CM

## 2020-05-25 PROBLEM — I72.4 PSEUDOANEURYSM OF FEMORAL ARTERY (HCC): Status: ACTIVE | Noted: 2020-05-25

## 2020-05-25 PROBLEM — R06.02 SHORTNESS OF BREATH: Status: ACTIVE | Noted: 2020-05-25

## 2020-05-25 LAB
ATRIAL RATE: 500 BPM
NT-PROBNP SERPL-MCNC: 5934 PG/ML
P AXIS: 260 DEGREES
QRS AXIS: -34 DEGREES
QRSD INTERVAL: 160 MS
QT INTERVAL: 456 MS
QTC INTERVAL: 451 MS
SARS-COV-2 RNA RESP QL NAA+PROBE: NEGATIVE
T WAVE AXIS: 128 DEGREES
TROPONIN I SERPL-MCNC: 1.06 NG/ML
VENTRICULAR RATE: 59 BPM

## 2020-05-25 PROCEDURE — 71045 X-RAY EXAM CHEST 1 VIEW: CPT

## 2020-05-25 PROCEDURE — NC001 PR NO CHARGE: Performed by: SURGERY

## 2020-05-25 PROCEDURE — 76942 ECHO GUIDE FOR BIOPSY: CPT

## 2020-05-25 PROCEDURE — G0379 DIRECT REFER HOSPITAL OBSERV: HCPCS

## 2020-05-25 PROCEDURE — 96374 THER/PROPH/DIAG INJ IV PUSH: CPT

## 2020-05-25 PROCEDURE — 36415 COLL VENOUS BLD VENIPUNCTURE: CPT | Performed by: EMERGENCY MEDICINE

## 2020-05-25 PROCEDURE — 99253 IP/OBS CNSLTJ NEW/EST LOW 45: CPT | Performed by: INTERNAL MEDICINE

## 2020-05-25 PROCEDURE — 36002 PSEUDOANEURYSM INJECTION TRT: CPT

## 2020-05-25 PROCEDURE — 87635 SARS-COV-2 COVID-19 AMP PRB: CPT | Performed by: EMERGENCY MEDICINE

## 2020-05-25 PROCEDURE — 36002 PSEUDOANEURYSM INJECTION TRT: CPT | Performed by: SURGERY

## 2020-05-25 PROCEDURE — 76882 US LMTD JT/FCL EVL NVASC XTR: CPT

## 2020-05-25 PROCEDURE — 99223 1ST HOSP IP/OBS HIGH 75: CPT | Performed by: INTERNAL MEDICINE

## 2020-05-25 PROCEDURE — 99255 IP/OBS CONSLTJ NEW/EST HI 80: CPT | Performed by: SURGERY

## 2020-05-25 PROCEDURE — 83880 ASSAY OF NATRIURETIC PEPTIDE: CPT | Performed by: EMERGENCY MEDICINE

## 2020-05-25 PROCEDURE — 04LK3DZ OCCLUSION OF RIGHT FEMORAL ARTERY WITH INTRALUMINAL DEVICE, PERCUTANEOUS APPROACH: ICD-10-PCS | Performed by: SURGERY

## 2020-05-25 PROCEDURE — 93010 ELECTROCARDIOGRAM REPORT: CPT | Performed by: INTERNAL MEDICINE

## 2020-05-25 PROCEDURE — 76942 ECHO GUIDE FOR BIOPSY: CPT | Performed by: SURGERY

## 2020-05-25 PROCEDURE — 84484 ASSAY OF TROPONIN QUANT: CPT | Performed by: EMERGENCY MEDICINE

## 2020-05-25 RX ORDER — LIDOCAINE HYDROCHLORIDE 10 MG/ML
10 INJECTION, SOLUTION EPIDURAL; INFILTRATION; INTRACAUDAL; PERINEURAL ONCE
Status: COMPLETED | OUTPATIENT
Start: 2020-05-25 | End: 2020-05-25

## 2020-05-25 RX ORDER — NICOTINE 21 MG/24HR
1 PATCH, TRANSDERMAL 24 HOURS TRANSDERMAL DAILY
Status: DISCONTINUED | OUTPATIENT
Start: 2020-05-25 | End: 2020-05-26 | Stop reason: HOSPADM

## 2020-05-25 RX ORDER — FUROSEMIDE 20 MG/1
20 TABLET ORAL DAILY PRN
Status: DISCONTINUED | OUTPATIENT
Start: 2020-05-25 | End: 2020-05-26 | Stop reason: HOSPADM

## 2020-05-25 RX ORDER — OXYCODONE HYDROCHLORIDE 5 MG/1
2.5 TABLET ORAL EVERY 4 HOURS PRN
Status: DISCONTINUED | OUTPATIENT
Start: 2020-05-25 | End: 2020-05-26 | Stop reason: HOSPADM

## 2020-05-25 RX ORDER — AMIODARONE HYDROCHLORIDE 200 MG/1
200 TABLET ORAL
Status: DISCONTINUED | OUTPATIENT
Start: 2020-05-25 | End: 2020-05-26 | Stop reason: HOSPADM

## 2020-05-25 RX ORDER — AMIODARONE HYDROCHLORIDE 200 MG/1
200 TABLET ORAL
Status: DISCONTINUED | OUTPATIENT
Start: 2020-05-30 | End: 2020-05-26 | Stop reason: HOSPADM

## 2020-05-25 RX ORDER — ACETAMINOPHEN 325 MG/1
975 TABLET ORAL EVERY 8 HOURS SCHEDULED
Status: DISCONTINUED | OUTPATIENT
Start: 2020-05-25 | End: 2020-05-26 | Stop reason: HOSPADM

## 2020-05-25 RX ORDER — LISINOPRIL 10 MG/1
10 TABLET ORAL DAILY
Status: DISCONTINUED | OUTPATIENT
Start: 2020-05-25 | End: 2020-05-26 | Stop reason: HOSPADM

## 2020-05-25 RX ORDER — OXYCODONE HYDROCHLORIDE 5 MG/1
5 TABLET ORAL EVERY 4 HOURS PRN
Status: DISCONTINUED | OUTPATIENT
Start: 2020-05-25 | End: 2020-05-26 | Stop reason: HOSPADM

## 2020-05-25 RX ORDER — METOPROLOL TARTRATE 50 MG/1
100 TABLET, FILM COATED ORAL EVERY 12 HOURS
Status: DISCONTINUED | OUTPATIENT
Start: 2020-05-25 | End: 2020-05-26 | Stop reason: HOSPADM

## 2020-05-25 RX ORDER — PANTOPRAZOLE SODIUM 40 MG/1
40 TABLET, DELAYED RELEASE ORAL
Status: DISCONTINUED | OUTPATIENT
Start: 2020-05-25 | End: 2020-05-26 | Stop reason: HOSPADM

## 2020-05-25 RX ORDER — DIPHENHYDRAMINE HYDROCHLORIDE 50 MG/ML
50 INJECTION INTRAMUSCULAR; INTRAVENOUS ONCE
Status: DISCONTINUED | OUTPATIENT
Start: 2020-05-25 | End: 2020-05-25

## 2020-05-25 RX ORDER — HYDRALAZINE HYDROCHLORIDE 20 MG/ML
10 INJECTION INTRAMUSCULAR; INTRAVENOUS EVERY 6 HOURS PRN
Status: DISCONTINUED | OUTPATIENT
Start: 2020-05-25 | End: 2020-05-26 | Stop reason: HOSPADM

## 2020-05-25 RX ADMIN — THROMBIN, TOPICAL (BOVINE): KIT at 11:41

## 2020-05-25 RX ADMIN — METOPROLOL TARTRATE 100 MG: 50 TABLET, FILM COATED ORAL at 21:56

## 2020-05-25 RX ADMIN — METOPROLOL TARTRATE 100 MG: 50 TABLET, FILM COATED ORAL at 09:09

## 2020-05-25 RX ADMIN — ACETAMINOPHEN 975 MG: 325 TABLET ORAL at 21:56

## 2020-05-25 RX ADMIN — LISINOPRIL 10 MG: 10 TABLET ORAL at 09:10

## 2020-05-25 RX ADMIN — ACETAMINOPHEN 975 MG: 325 TABLET ORAL at 09:15

## 2020-05-25 RX ADMIN — ACETAMINOPHEN 975 MG: 325 TABLET ORAL at 13:00

## 2020-05-25 RX ADMIN — OXYCODONE HYDROCHLORIDE 5 MG: 5 TABLET ORAL at 09:16

## 2020-05-25 RX ADMIN — AMIODARONE HYDROCHLORIDE 200 MG: 200 TABLET ORAL at 17:04

## 2020-05-25 RX ADMIN — OXYCODONE HYDROCHLORIDE 5 MG: 5 TABLET ORAL at 13:00

## 2020-05-25 RX ADMIN — LIDOCAINE HYDROCHLORIDE 10 ML: 10 INJECTION, SOLUTION EPIDURAL; INFILTRATION; INTRACAUDAL; PERINEURAL at 11:41

## 2020-05-25 RX ADMIN — NICOTINE 1 PATCH: 14 PATCH TRANSDERMAL at 09:11

## 2020-05-25 RX ADMIN — AMIODARONE HYDROCHLORIDE 200 MG: 200 TABLET ORAL at 12:04

## 2020-05-25 RX ADMIN — OXYCODONE HYDROCHLORIDE 2.5 MG: 5 TABLET ORAL at 19:56

## 2020-05-25 RX ADMIN — AMIODARONE HYDROCHLORIDE 200 MG: 200 TABLET ORAL at 09:10

## 2020-05-25 RX ADMIN — PANTOPRAZOLE SODIUM 40 MG: 40 TABLET, DELAYED RELEASE ORAL at 09:10

## 2020-05-25 RX ADMIN — MORPHINE SULFATE 2 MG: 2 INJECTION, SOLUTION INTRAMUSCULAR; INTRAVENOUS at 01:24

## 2020-05-26 ENCOUNTER — APPOINTMENT (INPATIENT)
Dept: NON INVASIVE DIAGNOSTICS | Facility: HOSPITAL | Age: 55
DRG: 182 | End: 2020-05-26
Payer: COMMERCIAL

## 2020-05-26 VITALS
WEIGHT: 231.7 LBS | TEMPERATURE: 98.3 F | HEIGHT: 67 IN | DIASTOLIC BLOOD PRESSURE: 88 MMHG | HEART RATE: 59 BPM | OXYGEN SATURATION: 98 % | BODY MASS INDEX: 36.37 KG/M2 | SYSTOLIC BLOOD PRESSURE: 181 MMHG | RESPIRATION RATE: 22 BRPM

## 2020-05-26 PROBLEM — I72.4 PSEUDOANEURYSM OF FEMORAL ARTERY (HCC): Status: RESOLVED | Noted: 2020-05-25 | Resolved: 2020-05-26

## 2020-05-26 PROBLEM — R06.02 SHORTNESS OF BREATH: Status: RESOLVED | Noted: 2020-05-25 | Resolved: 2020-05-26

## 2020-05-26 LAB
ALBUMIN SERPL BCP-MCNC: 3.1 G/DL (ref 3.5–5)
ALP SERPL-CCNC: 66 U/L (ref 46–116)
ALT SERPL W P-5'-P-CCNC: 20 U/L (ref 12–78)
ANION GAP SERPL CALCULATED.3IONS-SCNC: 5 MMOL/L (ref 4–13)
AST SERPL W P-5'-P-CCNC: 17 U/L (ref 5–45)
BASOPHILS # BLD AUTO: 0.03 THOUSANDS/ΜL (ref 0–0.1)
BASOPHILS NFR BLD AUTO: 1 % (ref 0–1)
BILIRUB SERPL-MCNC: 0.38 MG/DL (ref 0.2–1)
BUN SERPL-MCNC: 20 MG/DL (ref 5–25)
CALCIUM SERPL-MCNC: 8.7 MG/DL (ref 8.3–10.1)
CHLORIDE SERPL-SCNC: 108 MMOL/L (ref 100–108)
CO2 SERPL-SCNC: 26 MMOL/L (ref 21–32)
CREAT SERPL-MCNC: 1.66 MG/DL (ref 0.6–1.3)
EOSINOPHIL # BLD AUTO: 0.08 THOUSAND/ΜL (ref 0–0.61)
EOSINOPHIL NFR BLD AUTO: 2 % (ref 0–6)
ERYTHROCYTE [DISTWIDTH] IN BLOOD BY AUTOMATED COUNT: 13.8 % (ref 11.6–15.1)
GFR SERPL CREATININE-BSD FRML MDRD: 34 ML/MIN/1.73SQ M
GLUCOSE SERPL-MCNC: 94 MG/DL (ref 65–140)
HCT VFR BLD AUTO: 34 % (ref 34.8–46.1)
HGB BLD-MCNC: 10.5 G/DL (ref 11.5–15.4)
IMM GRANULOCYTES # BLD AUTO: 0.03 THOUSAND/UL (ref 0–0.2)
IMM GRANULOCYTES NFR BLD AUTO: 1 % (ref 0–2)
LYMPHOCYTES # BLD AUTO: 1.05 THOUSANDS/ΜL (ref 0.6–4.47)
LYMPHOCYTES NFR BLD AUTO: 27 % (ref 14–44)
MCH RBC QN AUTO: 26.5 PG (ref 26.8–34.3)
MCHC RBC AUTO-ENTMCNC: 30.9 G/DL (ref 31.4–37.4)
MCV RBC AUTO: 86 FL (ref 82–98)
MONOCYTES # BLD AUTO: 0.33 THOUSAND/ΜL (ref 0.17–1.22)
MONOCYTES NFR BLD AUTO: 8 % (ref 4–12)
NEUTROPHILS # BLD AUTO: 2.4 THOUSANDS/ΜL (ref 1.85–7.62)
NEUTS SEG NFR BLD AUTO: 61 % (ref 43–75)
NRBC BLD AUTO-RTO: 0 /100 WBCS
PLATELET # BLD AUTO: 98 THOUSANDS/UL (ref 149–390)
PMV BLD AUTO: 11.3 FL (ref 8.9–12.7)
POTASSIUM SERPL-SCNC: 4.4 MMOL/L (ref 3.5–5.3)
PROT SERPL-MCNC: 6.4 G/DL (ref 6.4–8.2)
RBC # BLD AUTO: 3.96 MILLION/UL (ref 3.81–5.12)
SODIUM SERPL-SCNC: 139 MMOL/L (ref 136–145)
WBC # BLD AUTO: 3.92 THOUSAND/UL (ref 4.31–10.16)

## 2020-05-26 PROCEDURE — 80053 COMPREHEN METABOLIC PANEL: CPT | Performed by: INTERNAL MEDICINE

## 2020-05-26 PROCEDURE — 85025 COMPLETE CBC W/AUTO DIFF WBC: CPT | Performed by: INTERNAL MEDICINE

## 2020-05-26 PROCEDURE — 99231 SBSQ HOSP IP/OBS SF/LOW 25: CPT | Performed by: SURGERY

## 2020-05-26 PROCEDURE — 97166 OT EVAL MOD COMPLEX 45 MIN: CPT

## 2020-05-26 PROCEDURE — 93926 LOWER EXTREMITY STUDY: CPT | Performed by: SURGERY

## 2020-05-26 PROCEDURE — 99239 HOSP IP/OBS DSCHRG MGMT >30: CPT | Performed by: INTERNAL MEDICINE

## 2020-05-26 PROCEDURE — 97162 PT EVAL MOD COMPLEX 30 MIN: CPT

## 2020-05-26 PROCEDURE — 93926 LOWER EXTREMITY STUDY: CPT

## 2020-05-26 RX ADMIN — LISINOPRIL 10 MG: 10 TABLET ORAL at 09:05

## 2020-05-26 RX ADMIN — METOPROLOL TARTRATE 100 MG: 50 TABLET, FILM COATED ORAL at 09:05

## 2020-05-26 RX ADMIN — PANTOPRAZOLE SODIUM 40 MG: 40 TABLET, DELAYED RELEASE ORAL at 06:15

## 2020-05-26 RX ADMIN — OXYCODONE HYDROCHLORIDE 5 MG: 5 TABLET ORAL at 09:06

## 2020-05-26 RX ADMIN — AMIODARONE HYDROCHLORIDE 200 MG: 200 TABLET ORAL at 09:06

## 2020-05-26 RX ADMIN — ACETAMINOPHEN 975 MG: 325 TABLET ORAL at 06:15

## 2020-05-26 RX ADMIN — RIVAROXABAN 20 MG: 20 TABLET, FILM COATED ORAL at 11:55

## 2020-05-26 RX ADMIN — AMIODARONE HYDROCHLORIDE 200 MG: 200 TABLET ORAL at 11:55

## 2020-06-03 ENCOUNTER — TELEPHONE (OUTPATIENT)
Dept: NEPHROLOGY | Facility: CLINIC | Age: 55
End: 2020-06-03

## 2020-06-15 ENCOUNTER — HOSPITAL ENCOUNTER (INPATIENT)
Facility: HOSPITAL | Age: 55
LOS: 3 days | Discharge: LEFT BEFORE DISCHARGE | DRG: 721 | End: 2020-06-18
Attending: EMERGENCY MEDICINE | Admitting: SURGERY
Payer: COMMERCIAL

## 2020-06-15 ENCOUNTER — APPOINTMENT (EMERGENCY)
Dept: RADIOLOGY | Facility: HOSPITAL | Age: 55
DRG: 721 | End: 2020-06-15
Payer: COMMERCIAL

## 2020-06-15 DIAGNOSIS — R10.31 RIGHT GROIN PAIN: ICD-10-CM

## 2020-06-15 DIAGNOSIS — L02.214 INGUINAL ABSCESS: Primary | ICD-10-CM

## 2020-06-15 DIAGNOSIS — N18.30 STAGE 3 CHRONIC KIDNEY DISEASE (HCC): ICD-10-CM

## 2020-06-15 DIAGNOSIS — I48.91 ATRIAL FIBRILLATION WITH RVR (HCC): ICD-10-CM

## 2020-06-15 DIAGNOSIS — Z86.79 HISTORY OF PSEUDOANEURYSM: ICD-10-CM

## 2020-06-15 DIAGNOSIS — S31.109A WOUND OF RIGHT GROIN, INITIAL ENCOUNTER: ICD-10-CM

## 2020-06-15 LAB
ANION GAP SERPL CALCULATED.3IONS-SCNC: 6 MMOL/L (ref 4–13)
BASOPHILS # BLD AUTO: 0.01 THOUSANDS/ΜL (ref 0–0.1)
BASOPHILS NFR BLD AUTO: 0 % (ref 0–1)
BUN SERPL-MCNC: 17 MG/DL (ref 5–25)
CALCIUM SERPL-MCNC: 8.6 MG/DL (ref 8.3–10.1)
CHLORIDE SERPL-SCNC: 106 MMOL/L (ref 100–108)
CO2 SERPL-SCNC: 25 MMOL/L (ref 21–32)
CREAT SERPL-MCNC: 1.74 MG/DL (ref 0.6–1.3)
EOSINOPHIL # BLD AUTO: 0.11 THOUSAND/ΜL (ref 0–0.61)
EOSINOPHIL NFR BLD AUTO: 2 % (ref 0–6)
ERYTHROCYTE [DISTWIDTH] IN BLOOD BY AUTOMATED COUNT: 13.8 % (ref 11.6–15.1)
GFR SERPL CREATININE-BSD FRML MDRD: 33 ML/MIN/1.73SQ M
GLUCOSE SERPL-MCNC: 88 MG/DL (ref 65–140)
HCT VFR BLD AUTO: 35.3 % (ref 34.8–46.1)
HGB BLD-MCNC: 11.1 G/DL (ref 11.5–15.4)
IMM GRANULOCYTES # BLD AUTO: 0.02 THOUSAND/UL (ref 0–0.2)
IMM GRANULOCYTES NFR BLD AUTO: 0 % (ref 0–2)
LYMPHOCYTES # BLD AUTO: 1.27 THOUSANDS/ΜL (ref 0.6–4.47)
LYMPHOCYTES NFR BLD AUTO: 22 % (ref 14–44)
MCH RBC QN AUTO: 26.7 PG (ref 26.8–34.3)
MCHC RBC AUTO-ENTMCNC: 31.4 G/DL (ref 31.4–37.4)
MCV RBC AUTO: 85 FL (ref 82–98)
MONOCYTES # BLD AUTO: 0.35 THOUSAND/ΜL (ref 0.17–1.22)
MONOCYTES NFR BLD AUTO: 6 % (ref 4–12)
NEUTROPHILS # BLD AUTO: 3.9 THOUSANDS/ΜL (ref 1.85–7.62)
NEUTS SEG NFR BLD AUTO: 70 % (ref 43–75)
NRBC BLD AUTO-RTO: 0 /100 WBCS
PLATELET # BLD AUTO: 148 THOUSANDS/UL (ref 149–390)
PMV BLD AUTO: 10.9 FL (ref 8.9–12.7)
POTASSIUM SERPL-SCNC: 4 MMOL/L (ref 3.5–5.3)
RBC # BLD AUTO: 4.15 MILLION/UL (ref 3.81–5.12)
SODIUM SERPL-SCNC: 137 MMOL/L (ref 136–145)
WBC # BLD AUTO: 5.66 THOUSAND/UL (ref 4.31–10.16)

## 2020-06-15 PROCEDURE — 80048 BASIC METABOLIC PNL TOTAL CA: CPT | Performed by: EMERGENCY MEDICINE

## 2020-06-15 PROCEDURE — 85025 COMPLETE CBC W/AUTO DIFF WBC: CPT | Performed by: EMERGENCY MEDICINE

## 2020-06-15 PROCEDURE — 99285 EMERGENCY DEPT VISIT HI MDM: CPT

## 2020-06-15 PROCEDURE — 96376 TX/PRO/DX INJ SAME DRUG ADON: CPT

## 2020-06-15 PROCEDURE — 76705 ECHO EXAM OF ABDOMEN: CPT

## 2020-06-15 PROCEDURE — 96374 THER/PROPH/DIAG INJ IV PUSH: CPT

## 2020-06-15 PROCEDURE — 36415 COLL VENOUS BLD VENIPUNCTURE: CPT | Performed by: EMERGENCY MEDICINE

## 2020-06-15 PROCEDURE — 99285 EMERGENCY DEPT VISIT HI MDM: CPT | Performed by: EMERGENCY MEDICINE

## 2020-06-15 RX ORDER — HYDROMORPHONE HCL/PF 1 MG/ML
0.5 SYRINGE (ML) INJECTION ONCE
Status: COMPLETED | OUTPATIENT
Start: 2020-06-15 | End: 2020-06-15

## 2020-06-15 RX ORDER — HEPARIN SODIUM 5000 [USP'U]/ML
5000 INJECTION, SOLUTION INTRAVENOUS; SUBCUTANEOUS EVERY 8 HOURS SCHEDULED
Status: DISCONTINUED | OUTPATIENT
Start: 2020-06-15 | End: 2020-06-17

## 2020-06-15 RX ORDER — METHYLPREDNISOLONE 16 MG/1
32 TABLET ORAL
Status: COMPLETED | OUTPATIENT
Start: 2020-06-15 | End: 2020-06-16

## 2020-06-15 RX ORDER — OXYCODONE HYDROCHLORIDE 10 MG/1
10 TABLET ORAL EVERY 4 HOURS PRN
Status: DISCONTINUED | OUTPATIENT
Start: 2020-06-15 | End: 2020-06-18 | Stop reason: HOSPADM

## 2020-06-15 RX ORDER — OXYCODONE HYDROCHLORIDE 5 MG/1
5 TABLET ORAL EVERY 4 HOURS PRN
Status: DISCONTINUED | OUTPATIENT
Start: 2020-06-15 | End: 2020-06-18 | Stop reason: HOSPADM

## 2020-06-15 RX ORDER — ACETAMINOPHEN 325 MG/1
650 TABLET ORAL EVERY 6 HOURS PRN
Status: DISCONTINUED | OUTPATIENT
Start: 2020-06-15 | End: 2020-06-18 | Stop reason: HOSPADM

## 2020-06-15 RX ORDER — DIPHENHYDRAMINE HCL 25 MG
50 TABLET ORAL
Status: DISCONTINUED | OUTPATIENT
Start: 2020-06-15 | End: 2020-06-18 | Stop reason: HOSPADM

## 2020-06-15 RX ORDER — SODIUM CHLORIDE 9 MG/ML
125 INJECTION, SOLUTION INTRAVENOUS CONTINUOUS
Status: DISCONTINUED | OUTPATIENT
Start: 2020-06-15 | End: 2020-06-16

## 2020-06-15 RX ORDER — CEFAZOLIN SODIUM 2 G/50ML
2000 SOLUTION INTRAVENOUS EVERY 8 HOURS
Status: COMPLETED | OUTPATIENT
Start: 2020-06-15 | End: 2020-06-16

## 2020-06-15 RX ORDER — ONDANSETRON 2 MG/ML
4 INJECTION INTRAMUSCULAR; INTRAVENOUS EVERY 6 HOURS PRN
Status: DISCONTINUED | OUTPATIENT
Start: 2020-06-15 | End: 2020-06-18 | Stop reason: HOSPADM

## 2020-06-15 RX ORDER — HYDROMORPHONE HCL/PF 1 MG/ML
0.5 SYRINGE (ML) INJECTION
Status: DISCONTINUED | OUTPATIENT
Start: 2020-06-15 | End: 2020-06-18 | Stop reason: HOSPADM

## 2020-06-15 RX ADMIN — HYDROMORPHONE HYDROCHLORIDE 0.5 MG: 1 INJECTION, SOLUTION INTRAMUSCULAR; INTRAVENOUS; SUBCUTANEOUS at 22:48

## 2020-06-15 RX ADMIN — HYDROMORPHONE HYDROCHLORIDE 0.5 MG: 1 INJECTION, SOLUTION INTRAMUSCULAR; INTRAVENOUS; SUBCUTANEOUS at 18:31

## 2020-06-16 ENCOUNTER — APPOINTMENT (INPATIENT)
Dept: RADIOLOGY | Facility: HOSPITAL | Age: 55
DRG: 721 | End: 2020-06-16
Payer: COMMERCIAL

## 2020-06-16 LAB
ANION GAP SERPL CALCULATED.3IONS-SCNC: 4 MMOL/L (ref 4–13)
BASOPHILS # BLD AUTO: 0.01 THOUSANDS/ΜL (ref 0–0.1)
BASOPHILS NFR BLD AUTO: 0 % (ref 0–1)
BUN SERPL-MCNC: 15 MG/DL (ref 5–25)
CALCIUM SERPL-MCNC: 8.6 MG/DL (ref 8.3–10.1)
CHLORIDE SERPL-SCNC: 108 MMOL/L (ref 100–108)
CO2 SERPL-SCNC: 27 MMOL/L (ref 21–32)
CREAT SERPL-MCNC: 1.66 MG/DL (ref 0.6–1.3)
EOSINOPHIL # BLD AUTO: 0.03 THOUSAND/ΜL (ref 0–0.61)
EOSINOPHIL NFR BLD AUTO: 1 % (ref 0–6)
ERYTHROCYTE [DISTWIDTH] IN BLOOD BY AUTOMATED COUNT: 13.7 % (ref 11.6–15.1)
GFR SERPL CREATININE-BSD FRML MDRD: 34 ML/MIN/1.73SQ M
GLUCOSE SERPL-MCNC: 111 MG/DL (ref 65–140)
HCT VFR BLD AUTO: 36.2 % (ref 34.8–46.1)
HGB BLD-MCNC: 11.2 G/DL (ref 11.5–15.4)
IMM GRANULOCYTES # BLD AUTO: 0.01 THOUSAND/UL (ref 0–0.2)
IMM GRANULOCYTES NFR BLD AUTO: 0 % (ref 0–2)
LYMPHOCYTES # BLD AUTO: 0.57 THOUSANDS/ΜL (ref 0.6–4.47)
LYMPHOCYTES NFR BLD AUTO: 13 % (ref 14–44)
MCH RBC QN AUTO: 26.5 PG (ref 26.8–34.3)
MCHC RBC AUTO-ENTMCNC: 30.9 G/DL (ref 31.4–37.4)
MCV RBC AUTO: 86 FL (ref 82–98)
MONOCYTES # BLD AUTO: 0.11 THOUSAND/ΜL (ref 0.17–1.22)
MONOCYTES NFR BLD AUTO: 3 % (ref 4–12)
NEUTROPHILS # BLD AUTO: 3.58 THOUSANDS/ΜL (ref 1.85–7.62)
NEUTS SEG NFR BLD AUTO: 83 % (ref 43–75)
NRBC BLD AUTO-RTO: 0 /100 WBCS
PLATELET # BLD AUTO: 122 THOUSANDS/UL (ref 149–390)
PMV BLD AUTO: 11.3 FL (ref 8.9–12.7)
POTASSIUM SERPL-SCNC: 4 MMOL/L (ref 3.5–5.3)
RBC # BLD AUTO: 4.23 MILLION/UL (ref 3.81–5.12)
SODIUM SERPL-SCNC: 139 MMOL/L (ref 136–145)
WBC # BLD AUTO: 4.31 THOUSAND/UL (ref 4.31–10.16)

## 2020-06-16 PROCEDURE — 87040 BLOOD CULTURE FOR BACTERIA: CPT | Performed by: SURGERY

## 2020-06-16 PROCEDURE — 99221 1ST HOSP IP/OBS SF/LOW 40: CPT | Performed by: SURGERY

## 2020-06-16 PROCEDURE — 80048 BASIC METABOLIC PNL TOTAL CA: CPT | Performed by: SURGERY

## 2020-06-16 PROCEDURE — 75635 CT ANGIO ABDOMINAL ARTERIES: CPT

## 2020-06-16 PROCEDURE — 85025 COMPLETE CBC W/AUTO DIFF WBC: CPT | Performed by: SURGERY

## 2020-06-16 PROCEDURE — 36415 COLL VENOUS BLD VENIPUNCTURE: CPT | Performed by: SURGERY

## 2020-06-16 PROCEDURE — NC001 PR NO CHARGE: Performed by: SURGERY

## 2020-06-16 RX ORDER — PANTOPRAZOLE SODIUM 40 MG/1
40 TABLET, DELAYED RELEASE ORAL
Status: DISCONTINUED | OUTPATIENT
Start: 2020-06-16 | End: 2020-06-18 | Stop reason: HOSPADM

## 2020-06-16 RX ORDER — LISINOPRIL 10 MG/1
10 TABLET ORAL DAILY
Status: DISCONTINUED | OUTPATIENT
Start: 2020-06-16 | End: 2020-06-18 | Stop reason: HOSPADM

## 2020-06-16 RX ORDER — LABETALOL 20 MG/4 ML (5 MG/ML) INTRAVENOUS SYRINGE
10 EVERY 4 HOURS PRN
Status: DISCONTINUED | OUTPATIENT
Start: 2020-06-16 | End: 2020-06-18 | Stop reason: HOSPADM

## 2020-06-16 RX ORDER — CEFAZOLIN SODIUM 1 G/50ML
1000 SOLUTION INTRAVENOUS EVERY 8 HOURS
Status: DISCONTINUED | OUTPATIENT
Start: 2020-06-16 | End: 2020-06-18 | Stop reason: HOSPADM

## 2020-06-16 RX ORDER — FUROSEMIDE 20 MG/1
20 TABLET ORAL DAILY PRN
Status: DISCONTINUED | OUTPATIENT
Start: 2020-06-16 | End: 2020-06-18 | Stop reason: HOSPADM

## 2020-06-16 RX ORDER — HYDRALAZINE HYDROCHLORIDE 20 MG/ML
10 INJECTION INTRAMUSCULAR; INTRAVENOUS EVERY 6 HOURS PRN
Status: DISCONTINUED | OUTPATIENT
Start: 2020-06-16 | End: 2020-06-18 | Stop reason: HOSPADM

## 2020-06-16 RX ORDER — METOPROLOL TARTRATE 50 MG/1
100 TABLET, FILM COATED ORAL EVERY 12 HOURS
Status: DISCONTINUED | OUTPATIENT
Start: 2020-06-16 | End: 2020-06-18 | Stop reason: HOSPADM

## 2020-06-16 RX ORDER — AMIODARONE HYDROCHLORIDE 200 MG/1
200 TABLET ORAL
Status: DISCONTINUED | OUTPATIENT
Start: 2020-06-17 | End: 2020-06-18 | Stop reason: HOSPADM

## 2020-06-16 RX ADMIN — SODIUM CHLORIDE 125 ML/HR: 0.9 INJECTION, SOLUTION INTRAVENOUS at 00:02

## 2020-06-16 RX ADMIN — METOPROLOL TARTRATE 100 MG: 50 TABLET, FILM COATED ORAL at 21:05

## 2020-06-16 RX ADMIN — METHYLPREDNISOLONE 32 MG: 16 TABLET ORAL at 11:38

## 2020-06-16 RX ADMIN — HYDROMORPHONE HYDROCHLORIDE 0.5 MG: 1 INJECTION, SOLUTION INTRAMUSCULAR; INTRAVENOUS; SUBCUTANEOUS at 11:41

## 2020-06-16 RX ADMIN — OXYCODONE HYDROCHLORIDE 10 MG: 10 TABLET ORAL at 21:11

## 2020-06-16 RX ADMIN — HYDRALAZINE HYDROCHLORIDE 10 MG: 20 INJECTION INTRAMUSCULAR; INTRAVENOUS at 18:14

## 2020-06-16 RX ADMIN — HEPARIN SODIUM 5000 UNITS: 5000 INJECTION INTRAVENOUS; SUBCUTANEOUS at 00:06

## 2020-06-16 RX ADMIN — IOHEXOL 120 ML: 350 INJECTION, SOLUTION INTRAVENOUS at 15:01

## 2020-06-16 RX ADMIN — CEFAZOLIN SODIUM 1000 MG: 1 SOLUTION INTRAVENOUS at 17:11

## 2020-06-16 RX ADMIN — NICOTINE 1 PATCH: 7 PATCH TRANSDERMAL at 08:29

## 2020-06-16 RX ADMIN — HEPARIN SODIUM 5000 UNITS: 5000 INJECTION INTRAVENOUS; SUBCUTANEOUS at 21:05

## 2020-06-16 RX ADMIN — OXYCODONE HYDROCHLORIDE 5 MG: 5 TABLET ORAL at 08:25

## 2020-06-16 RX ADMIN — METHYLPREDNISOLONE 32 MG: 16 TABLET ORAL at 00:06

## 2020-06-16 RX ADMIN — DIPHENHYDRAMINE HCL 50 MG: 25 TABLET ORAL at 12:28

## 2020-06-16 RX ADMIN — LISINOPRIL 10 MG: 10 TABLET ORAL at 17:11

## 2020-06-16 RX ADMIN — SODIUM CHLORIDE 125 ML/HR: 0.9 INJECTION, SOLUTION INTRAVENOUS at 12:08

## 2020-06-16 RX ADMIN — HEPARIN SODIUM 5000 UNITS: 5000 INJECTION INTRAVENOUS; SUBCUTANEOUS at 05:21

## 2020-06-16 RX ADMIN — HYDROMORPHONE HYDROCHLORIDE 0.5 MG: 1 INJECTION, SOLUTION INTRAMUSCULAR; INTRAVENOUS; SUBCUTANEOUS at 22:08

## 2020-06-16 RX ADMIN — OXYCODONE HYDROCHLORIDE 10 MG: 10 TABLET ORAL at 17:11

## 2020-06-16 RX ADMIN — METOPROLOL TARTRATE 100 MG: 50 TABLET, FILM COATED ORAL at 08:25

## 2020-06-16 RX ADMIN — LABETALOL 20 MG/4 ML (5 MG/ML) INTRAVENOUS SYRINGE 10 MG: at 22:07

## 2020-06-16 RX ADMIN — PANTOPRAZOLE SODIUM 40 MG: 40 TABLET, DELAYED RELEASE ORAL at 06:28

## 2020-06-16 RX ADMIN — CEFAZOLIN SODIUM 2000 MG: 2 SOLUTION INTRAVENOUS at 00:05

## 2020-06-17 ENCOUNTER — APPOINTMENT (INPATIENT)
Dept: RADIOLOGY | Facility: HOSPITAL | Age: 55
DRG: 721 | End: 2020-06-17
Payer: COMMERCIAL

## 2020-06-17 PROBLEM — L02.214 INGUINAL ABSCESS: Status: ACTIVE | Noted: 2020-06-17

## 2020-06-17 LAB
ANION GAP SERPL CALCULATED.3IONS-SCNC: 6 MMOL/L (ref 4–13)
APTT PPP: 29 SECONDS (ref 23–37)
BUN SERPL-MCNC: 16 MG/DL (ref 5–25)
CALCIUM SERPL-MCNC: 9 MG/DL (ref 8.3–10.1)
CHLORIDE SERPL-SCNC: 108 MMOL/L (ref 100–108)
CO2 SERPL-SCNC: 23 MMOL/L (ref 21–32)
CREAT SERPL-MCNC: 1.54 MG/DL (ref 0.6–1.3)
ERYTHROCYTE [DISTWIDTH] IN BLOOD BY AUTOMATED COUNT: 13.6 % (ref 11.6–15.1)
GFR SERPL CREATININE-BSD FRML MDRD: 38 ML/MIN/1.73SQ M
GLUCOSE SERPL-MCNC: 98 MG/DL (ref 65–140)
HCT VFR BLD AUTO: 33.6 % (ref 34.8–46.1)
HGB BLD-MCNC: 10.7 G/DL (ref 11.5–15.4)
INR PPP: 1.14 (ref 0.84–1.19)
MCH RBC QN AUTO: 27 PG (ref 26.8–34.3)
MCHC RBC AUTO-ENTMCNC: 31.8 G/DL (ref 31.4–37.4)
MCV RBC AUTO: 85 FL (ref 82–98)
PLATELET # BLD AUTO: 140 THOUSANDS/UL (ref 149–390)
PMV BLD AUTO: 11.8 FL (ref 8.9–12.7)
POTASSIUM SERPL-SCNC: 4.4 MMOL/L (ref 3.5–5.3)
PROTHROMBIN TIME: 14.2 SECONDS (ref 11.6–14.5)
RBC # BLD AUTO: 3.97 MILLION/UL (ref 3.81–5.12)
SODIUM SERPL-SCNC: 137 MMOL/L (ref 136–145)
WBC # BLD AUTO: 6.07 THOUSAND/UL (ref 4.31–10.16)

## 2020-06-17 PROCEDURE — 0Y953ZZ DRAINAGE OF RIGHT INGUINAL REGION, PERCUTANEOUS APPROACH: ICD-10-PCS | Performed by: RADIOLOGY

## 2020-06-17 PROCEDURE — 10005 FNA BX W/US GDN 1ST LES: CPT

## 2020-06-17 PROCEDURE — 10160 PNXR ASPIR ABSC HMTMA BULLA: CPT | Performed by: RADIOLOGY

## 2020-06-17 PROCEDURE — 85610 PROTHROMBIN TIME: CPT | Performed by: PHYSICIAN ASSISTANT

## 2020-06-17 PROCEDURE — 87205 SMEAR GRAM STAIN: CPT | Performed by: PHYSICIAN ASSISTANT

## 2020-06-17 PROCEDURE — 99232 SBSQ HOSP IP/OBS MODERATE 35: CPT | Performed by: SURGERY

## 2020-06-17 PROCEDURE — 76942 ECHO GUIDE FOR BIOPSY: CPT | Performed by: RADIOLOGY

## 2020-06-17 PROCEDURE — 80048 BASIC METABOLIC PNL TOTAL CA: CPT | Performed by: PHYSICIAN ASSISTANT

## 2020-06-17 PROCEDURE — 87075 CULTR BACTERIA EXCEPT BLOOD: CPT | Performed by: PHYSICIAN ASSISTANT

## 2020-06-17 PROCEDURE — 87070 CULTURE OTHR SPECIMN AEROBIC: CPT | Performed by: PHYSICIAN ASSISTANT

## 2020-06-17 PROCEDURE — 85730 THROMBOPLASTIN TIME PARTIAL: CPT | Performed by: PHYSICIAN ASSISTANT

## 2020-06-17 PROCEDURE — 85027 COMPLETE CBC AUTOMATED: CPT | Performed by: PHYSICIAN ASSISTANT

## 2020-06-17 RX ADMIN — CEFAZOLIN SODIUM 1000 MG: 1 SOLUTION INTRAVENOUS at 16:34

## 2020-06-17 RX ADMIN — CEFAZOLIN SODIUM 1000 MG: 1 SOLUTION INTRAVENOUS at 00:06

## 2020-06-17 RX ADMIN — NICOTINE 1 PATCH: 7 PATCH TRANSDERMAL at 08:31

## 2020-06-17 RX ADMIN — OXYCODONE HYDROCHLORIDE 10 MG: 10 TABLET ORAL at 04:58

## 2020-06-17 RX ADMIN — LISINOPRIL 10 MG: 10 TABLET ORAL at 08:27

## 2020-06-17 RX ADMIN — PANTOPRAZOLE SODIUM 40 MG: 40 TABLET, DELAYED RELEASE ORAL at 05:00

## 2020-06-17 RX ADMIN — RIVAROXABAN 20 MG: 20 TABLET, FILM COATED ORAL at 16:34

## 2020-06-17 RX ADMIN — METOPROLOL TARTRATE 100 MG: 50 TABLET, FILM COATED ORAL at 08:27

## 2020-06-17 RX ADMIN — METOPROLOL TARTRATE 100 MG: 50 TABLET, FILM COATED ORAL at 20:35

## 2020-06-17 RX ADMIN — CEFAZOLIN SODIUM 1000 MG: 1 SOLUTION INTRAVENOUS at 08:27

## 2020-06-17 RX ADMIN — OXYCODONE HYDROCHLORIDE 10 MG: 10 TABLET ORAL at 20:35

## 2020-06-17 RX ADMIN — HEPARIN SODIUM 5000 UNITS: 5000 INJECTION INTRAVENOUS; SUBCUTANEOUS at 13:29

## 2020-06-17 RX ADMIN — AMIODARONE HYDROCHLORIDE 200 MG: 200 TABLET ORAL at 08:27

## 2020-06-17 RX ADMIN — OXYCODONE HYDROCHLORIDE 10 MG: 10 TABLET ORAL at 13:28

## 2020-06-18 VITALS
SYSTOLIC BLOOD PRESSURE: 149 MMHG | OXYGEN SATURATION: 98 % | RESPIRATION RATE: 18 BRPM | DIASTOLIC BLOOD PRESSURE: 96 MMHG | HEIGHT: 67 IN | WEIGHT: 231 LBS | HEART RATE: 60 BPM | BODY MASS INDEX: 36.26 KG/M2 | TEMPERATURE: 98.2 F

## 2020-06-18 LAB
ANION GAP SERPL CALCULATED.3IONS-SCNC: 4 MMOL/L (ref 4–13)
BUN SERPL-MCNC: 17 MG/DL (ref 5–25)
CALCIUM SERPL-MCNC: 8.4 MG/DL (ref 8.3–10.1)
CHLORIDE SERPL-SCNC: 108 MMOL/L (ref 100–108)
CO2 SERPL-SCNC: 26 MMOL/L (ref 21–32)
CREAT SERPL-MCNC: 1.63 MG/DL (ref 0.6–1.3)
ERYTHROCYTE [DISTWIDTH] IN BLOOD BY AUTOMATED COUNT: 13.8 % (ref 11.6–15.1)
GFR SERPL CREATININE-BSD FRML MDRD: 35 ML/MIN/1.73SQ M
GLUCOSE SERPL-MCNC: 78 MG/DL (ref 65–140)
HCT VFR BLD AUTO: 34.7 % (ref 34.8–46.1)
HGB BLD-MCNC: 10.9 G/DL (ref 11.5–15.4)
MCH RBC QN AUTO: 26.9 PG (ref 26.8–34.3)
MCHC RBC AUTO-ENTMCNC: 31.4 G/DL (ref 31.4–37.4)
MCV RBC AUTO: 86 FL (ref 82–98)
PLATELET # BLD AUTO: 101 THOUSANDS/UL (ref 149–390)
PMV BLD AUTO: 11.3 FL (ref 8.9–12.7)
POTASSIUM SERPL-SCNC: 3.8 MMOL/L (ref 3.5–5.3)
RBC # BLD AUTO: 4.05 MILLION/UL (ref 3.81–5.12)
SODIUM SERPL-SCNC: 138 MMOL/L (ref 136–145)
WBC # BLD AUTO: 4.2 THOUSAND/UL (ref 4.31–10.16)

## 2020-06-18 PROCEDURE — 80048 BASIC METABOLIC PNL TOTAL CA: CPT | Performed by: PHYSICIAN ASSISTANT

## 2020-06-18 PROCEDURE — 85027 COMPLETE CBC AUTOMATED: CPT | Performed by: PHYSICIAN ASSISTANT

## 2020-06-18 PROCEDURE — NC001 PR NO CHARGE: Performed by: SURGERY

## 2020-06-18 PROCEDURE — 99238 HOSP IP/OBS DSCHRG MGMT 30/<: CPT | Performed by: PHYSICIAN ASSISTANT

## 2020-06-18 RX ORDER — AMIODARONE HYDROCHLORIDE 200 MG/1
200 TABLET ORAL DAILY
COMMUNITY
Start: 2020-06-18 | End: 2020-06-24 | Stop reason: ALTCHOICE

## 2020-06-18 RX ORDER — SULFAMETHOXAZOLE AND TRIMETHOPRIM 800; 160 MG/1; MG/1
2 TABLET ORAL EVERY 12 HOURS SCHEDULED
Qty: 28 TABLET | Refills: 0 | Status: SHIPPED | OUTPATIENT
Start: 2020-06-18 | End: 2020-06-25

## 2020-06-18 RX ADMIN — AMIODARONE HYDROCHLORIDE 200 MG: 200 TABLET ORAL at 08:20

## 2020-06-18 RX ADMIN — OXYCODONE HYDROCHLORIDE 10 MG: 10 TABLET ORAL at 06:38

## 2020-06-18 RX ADMIN — PANTOPRAZOLE SODIUM 40 MG: 40 TABLET, DELAYED RELEASE ORAL at 05:07

## 2020-06-18 RX ADMIN — CEFAZOLIN SODIUM 1000 MG: 1 SOLUTION INTRAVENOUS at 00:51

## 2020-06-18 RX ADMIN — CEFAZOLIN SODIUM 1000 MG: 1 SOLUTION INTRAVENOUS at 08:20

## 2020-06-18 RX ADMIN — METOPROLOL TARTRATE 100 MG: 50 TABLET, FILM COATED ORAL at 08:19

## 2020-06-18 RX ADMIN — NICOTINE 1 PATCH: 7 PATCH TRANSDERMAL at 08:20

## 2020-06-18 RX ADMIN — LISINOPRIL 10 MG: 10 TABLET ORAL at 08:20

## 2020-06-20 LAB
BACTERIA SPEC ANAEROBE CULT: NO GROWTH
BACTERIA SPEC BFLD CULT: NO GROWTH
GRAM STN SPEC: NORMAL

## 2020-06-21 LAB
BACTERIA BLD CULT: NORMAL
BACTERIA BLD CULT: NORMAL

## 2020-06-23 ENCOUNTER — TELEPHONE (OUTPATIENT)
Dept: VASCULAR SURGERY | Facility: CLINIC | Age: 55
End: 2020-06-23

## 2020-06-24 ENCOUNTER — OFFICE VISIT (OUTPATIENT)
Dept: VASCULAR SURGERY | Facility: CLINIC | Age: 55
End: 2020-06-24
Payer: COMMERCIAL

## 2020-06-24 VITALS
BODY MASS INDEX: 33.27 KG/M2 | WEIGHT: 212 LBS | TEMPERATURE: 97.7 F | SYSTOLIC BLOOD PRESSURE: 160 MMHG | DIASTOLIC BLOOD PRESSURE: 88 MMHG | HEART RATE: 62 BPM | HEIGHT: 67 IN

## 2020-06-24 DIAGNOSIS — L02.214 ABSCESS OF RIGHT GROIN: Primary | ICD-10-CM

## 2020-06-24 DIAGNOSIS — N18.30 STAGE 3 CHRONIC KIDNEY DISEASE (HCC): ICD-10-CM

## 2020-06-24 DIAGNOSIS — I72.4 FEMORAL ARTERY PSEUDOANEURYSM COMPLICATING CARDIAC CATHETERIZATION, SUBSEQUENT ENCOUNTER (HCC): ICD-10-CM

## 2020-06-24 DIAGNOSIS — T81.718D FEMORAL ARTERY PSEUDOANEURYSM COMPLICATING CARDIAC CATHETERIZATION, SUBSEQUENT ENCOUNTER (HCC): ICD-10-CM

## 2020-06-24 PROBLEM — T81.718A FEMORAL ARTERY PSEUDOANEURYSM COMPLICATING CARDIAC CATHETERIZATION (HCC): Status: RESOLVED | Noted: 2020-05-25 | Resolved: 2020-06-24

## 2020-06-24 PROBLEM — T81.718A FEMORAL ARTERY PSEUDOANEURYSM COMPLICATING CARDIAC CATHETERIZATION (HCC): Status: ACTIVE | Noted: 2020-05-25

## 2020-06-24 PROCEDURE — 99214 OFFICE O/P EST MOD 30 MIN: CPT | Performed by: SURGERY

## 2020-07-01 ENCOUNTER — TELEPHONE (OUTPATIENT)
Dept: NEPHROLOGY | Facility: CLINIC | Age: 55
End: 2020-07-01

## 2020-07-13 ENCOUNTER — APPOINTMENT (EMERGENCY)
Dept: RADIOLOGY | Facility: HOSPITAL | Age: 55
End: 2020-07-13
Payer: COMMERCIAL

## 2020-07-13 ENCOUNTER — HOSPITAL ENCOUNTER (EMERGENCY)
Facility: HOSPITAL | Age: 55
Discharge: HOME | End: 2020-07-13
Payer: COMMERCIAL

## 2020-07-13 VITALS
RESPIRATION RATE: 17 BRPM | HEART RATE: 62 BPM | DIASTOLIC BLOOD PRESSURE: 66 MMHG | BODY MASS INDEX: 33.19 KG/M2 | OXYGEN SATURATION: 97 % | WEIGHT: 211.9 LBS | TEMPERATURE: 99.4 F | SYSTOLIC BLOOD PRESSURE: 162 MMHG

## 2020-07-13 DIAGNOSIS — Z76.0 MEDICATION REFILL: Primary | ICD-10-CM

## 2020-07-13 DIAGNOSIS — S20.211A RIB CONTUSION, RIGHT, INITIAL ENCOUNTER: Primary | ICD-10-CM

## 2020-07-13 PROCEDURE — 71101 X-RAY EXAM UNILAT RIBS/CHEST: CPT

## 2020-07-13 PROCEDURE — 99284 EMERGENCY DEPT VISIT MOD MDM: CPT | Performed by: PHYSICIAN ASSISTANT

## 2020-07-13 PROCEDURE — 99283 EMERGENCY DEPT VISIT LOW MDM: CPT

## 2020-07-13 RX ORDER — MORPHINE SULFATE 15 MG/1
7.5 TABLET ORAL EVERY 6 HOURS PRN
Qty: 4 TABLET | Refills: 0 | Status: SHIPPED | OUTPATIENT
Start: 2020-07-13 | End: 2020-07-15

## 2020-07-13 RX ORDER — MORPHINE SULFATE 15 MG/1
7.5 TABLET ORAL ONCE
Status: DISCONTINUED | OUTPATIENT
Start: 2020-07-13 | End: 2020-07-13

## 2020-07-13 RX ORDER — MORPHINE SULFATE 15 MG/1
7.5 TABLET ORAL ONCE
Status: COMPLETED | OUTPATIENT
Start: 2020-07-13 | End: 2020-07-13

## 2020-07-13 RX ORDER — FLUTICASONE PROPIONATE 50 MCG
SPRAY, SUSPENSION (ML) NASAL
Qty: 32 G | Refills: 3 | Status: SHIPPED | OUTPATIENT
Start: 2020-07-13 | End: 2020-08-06 | Stop reason: ALTCHOICE

## 2020-07-13 RX ADMIN — MORPHINE SULFATE 7.5 MG: 15 TABLET ORAL at 12:49
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
## 2020-07-13 NOTE — DISCHARGE INSTRUCTIONS
Take Tylenol as needed for your rib pain  Apply ice for 20 minutes on 20 minutes off as needed  Only take morphine if your pain is severe

## 2020-08-03 ENCOUNTER — TELEPHONE (OUTPATIENT)
Dept: CARDIOLOGY CLINIC | Facility: CLINIC | Age: 55
End: 2020-08-03

## 2020-08-06 ENCOUNTER — OFFICE VISIT (OUTPATIENT)
Dept: CARDIOLOGY CLINIC | Facility: CLINIC | Age: 55
End: 2020-08-06
Payer: COMMERCIAL

## 2020-08-06 VITALS
HEART RATE: 60 BPM | TEMPERATURE: 97.3 F | DIASTOLIC BLOOD PRESSURE: 80 MMHG | SYSTOLIC BLOOD PRESSURE: 138 MMHG | HEIGHT: 67 IN | WEIGHT: 207 LBS | BODY MASS INDEX: 32.49 KG/M2

## 2020-08-06 DIAGNOSIS — I48.0 PAROXYSMAL ATRIAL FIBRILLATION (HCC): Primary | Chronic | ICD-10-CM

## 2020-08-06 PROCEDURE — 93000 ELECTROCARDIOGRAM COMPLETE: CPT | Performed by: PHYSICIAN ASSISTANT

## 2020-08-06 PROCEDURE — 99214 OFFICE O/P EST MOD 30 MIN: CPT | Performed by: PHYSICIAN ASSISTANT

## 2020-08-06 RX ORDER — METOPROLOL SUCCINATE 100 MG/1
150 TABLET, EXTENDED RELEASE ORAL 2 TIMES DAILY
Status: ON HOLD | COMMUNITY
End: 2021-03-11 | Stop reason: SDUPTHER

## 2020-08-06 RX ORDER — FAMOTIDINE 20 MG/1
TABLET, FILM COATED ORAL
Qty: 2 TABLET | OUTPATIENT
Start: 2020-08-06
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
## 2020-08-07 ENCOUNTER — HOSPITAL ENCOUNTER (EMERGENCY)
Facility: HOSPITAL | Age: 55
Discharge: HOME | End: 2020-08-07
Payer: COMMERCIAL

## 2020-08-07 ENCOUNTER — APPOINTMENT (EMERGENCY)
Dept: RADIOLOGY | Facility: HOSPITAL | Age: 55
End: 2020-08-07
Payer: COMMERCIAL

## 2020-08-07 VITALS
HEIGHT: 67 IN | OXYGEN SATURATION: 99 % | TEMPERATURE: 97.6 F | WEIGHT: 210 LBS | BODY MASS INDEX: 32.96 KG/M2 | DIASTOLIC BLOOD PRESSURE: 84 MMHG | HEART RATE: 63 BPM | SYSTOLIC BLOOD PRESSURE: 173 MMHG | RESPIRATION RATE: 16 BRPM

## 2020-08-07 DIAGNOSIS — S70.02XA CONTUSION OF LEFT HIP, INITIAL ENCOUNTER: Primary | ICD-10-CM

## 2020-08-07 PROCEDURE — 99284 EMERGENCY DEPT VISIT MOD MDM: CPT

## 2020-08-07 PROCEDURE — 73502 X-RAY EXAM HIP UNI 2-3 VIEWS: CPT

## 2020-08-07 PROCEDURE — 99283 EMERGENCY DEPT VISIT LOW MDM: CPT

## 2020-08-07 RX ORDER — OXYCODONE HYDROCHLORIDE AND ACETAMINOPHEN 5; 325 MG/1; MG/1
1 TABLET ORAL EVERY 6 HOURS PRN
Qty: 10 TABLET | Refills: 0 | OUTPATIENT
Start: 2020-08-07 | End: 2020-09-06

## 2020-08-07 RX ORDER — OXYCODONE HYDROCHLORIDE AND ACETAMINOPHEN 5; 325 MG/1; MG/1
1 TABLET ORAL ONCE
Status: COMPLETED | OUTPATIENT
Start: 2020-08-07 | End: 2020-08-07

## 2020-08-07 RX ORDER — OXYCODONE HYDROCHLORIDE AND ACETAMINOPHEN 5; 325 MG/1; MG/1
1 TABLET ORAL EVERY 6 HOURS PRN
Status: DISCONTINUED | OUTPATIENT
Start: 2020-08-07 | End: 2020-08-07

## 2020-08-07 RX ADMIN — OXYCODONE HYDROCHLORIDE AND ACETAMINOPHEN 1 TABLET: 5; 325 TABLET ORAL at 14:53
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
## 2020-08-18 ENCOUNTER — TELEMEDICINE (OUTPATIENT)
Dept: NEPHROLOGY | Facility: CLINIC | Age: 55
End: 2020-08-18
Payer: COMMERCIAL

## 2020-08-18 DIAGNOSIS — K21.9 REFLUX LARYNGITIS: ICD-10-CM

## 2020-08-18 DIAGNOSIS — N18.30 STAGE 3 CHRONIC KIDNEY DISEASE (HCC): ICD-10-CM

## 2020-08-18 DIAGNOSIS — N18.30 BENIGN HYPERTENSION WITH CKD (CHRONIC KIDNEY DISEASE) STAGE III (HCC): Primary | ICD-10-CM

## 2020-08-18 DIAGNOSIS — J04.0 REFLUX LARYNGITIS: ICD-10-CM

## 2020-08-18 DIAGNOSIS — I12.9 BENIGN HYPERTENSION WITH CKD (CHRONIC KIDNEY DISEASE) STAGE III (HCC): Primary | ICD-10-CM

## 2020-08-18 DIAGNOSIS — K21.00 GASTROESOPHAGEAL REFLUX DISEASE WITH ESOPHAGITIS: ICD-10-CM

## 2020-08-18 DIAGNOSIS — E87.5 HYPERKALEMIA: ICD-10-CM

## 2020-08-18 DIAGNOSIS — M79.89 LEG SWELLING: ICD-10-CM

## 2020-08-18 PROCEDURE — 99244 OFF/OP CNSLTJ NEW/EST MOD 40: CPT | Performed by: INTERNAL MEDICINE

## 2020-08-18 RX ORDER — PANTOPRAZOLE SODIUM 40 MG/1
40 TABLET, DELAYED RELEASE ORAL DAILY PRN
Qty: 30 TABLET | Refills: 1 | Status: SHIPPED | OUTPATIENT
Start: 2020-08-18 | End: 2020-09-03

## 2020-08-18 RX ORDER — FUROSEMIDE 20 MG/1
20 TABLET ORAL DAILY
Qty: 30 TABLET | Refills: 5 | Status: SHIPPED | OUTPATIENT
Start: 2020-08-18 | End: 2020-11-16 | Stop reason: SDUPTHER
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
## 2020-08-31 RX ORDER — FAMOTIDINE 20 MG/1
TABLET, FILM COATED ORAL
Qty: 2 TABLET | OUTPATIENT
Start: 2020-08-31

## 2020-09-02 DIAGNOSIS — K21.00 GASTROESOPHAGEAL REFLUX DISEASE WITH ESOPHAGITIS: ICD-10-CM

## 2020-09-03 RX ORDER — PANTOPRAZOLE SODIUM 40 MG/1
TABLET, DELAYED RELEASE ORAL
Qty: 30 TABLET | Refills: 1 | Status: SHIPPED | OUTPATIENT
Start: 2020-09-03 | End: 2020-11-02

## 2020-09-06 ENCOUNTER — APPOINTMENT (EMERGENCY)
Dept: RADIOLOGY | Facility: HOSPITAL | Age: 55
End: 2020-09-06
Payer: COMMERCIAL

## 2020-09-06 ENCOUNTER — HOSPITAL ENCOUNTER (EMERGENCY)
Facility: HOSPITAL | Age: 55
Discharge: HOME | End: 2020-09-06
Attending: EMERGENCY MEDICINE | Admitting: EMERGENCY MEDICINE
Payer: COMMERCIAL

## 2020-09-06 VITALS
DIASTOLIC BLOOD PRESSURE: 85 MMHG | HEIGHT: 67 IN | BODY MASS INDEX: 32.96 KG/M2 | HEART RATE: 60 BPM | OXYGEN SATURATION: 98 % | SYSTOLIC BLOOD PRESSURE: 182 MMHG | TEMPERATURE: 99.3 F | RESPIRATION RATE: 16 BRPM | WEIGHT: 210 LBS

## 2020-09-06 DIAGNOSIS — M54.6 ACUTE THORACIC BACK PAIN, UNSPECIFIED BACK PAIN LATERALITY: Primary | ICD-10-CM

## 2020-09-06 LAB
ALBUMIN SERPL BCP-MCNC: 3.8 G/DL (ref 3.4–4.8)
ALP SERPL-CCNC: 62.3 U/L (ref 35–140)
ALT SERPL W P-5'-P-CCNC: 8 U/L (ref 5–54)
ANION GAP SERPL CALCULATED.3IONS-SCNC: 9 MMOL/L (ref 4–13)
AST SERPL W P-5'-P-CCNC: 15 U/L (ref 15–41)
BACTERIA UR QL AUTO: ABNORMAL /HPF
BASOPHILS # BLD AUTO: 0.01 THOUSANDS/ΜL (ref 0–0.1)
BASOPHILS NFR BLD AUTO: 0 % (ref 0–1)
BILIRUB SERPL-MCNC: 0.32 MG/DL (ref 0.3–1.2)
BILIRUB UR QL STRIP: NEGATIVE
BUN SERPL-MCNC: 13 MG/DL (ref 6–20)
CALCIUM SERPL-MCNC: 9.2 MG/DL (ref 8.4–10.2)
CHLORIDE SERPL-SCNC: 104 MMOL/L (ref 96–108)
CLARITY UR: ABNORMAL
CO2 SERPL-SCNC: 28 MMOL/L (ref 22–33)
COLOR UR: YELLOW
CREAT SERPL-MCNC: 1.45 MG/DL (ref 0.4–1.1)
EOSINOPHIL # BLD AUTO: 0.08 THOUSAND/ΜL (ref 0–0.61)
EOSINOPHIL NFR BLD AUTO: 1 % (ref 0–6)
ERYTHROCYTE [DISTWIDTH] IN BLOOD BY AUTOMATED COUNT: 14 % (ref 11.6–15.1)
GFR SERPL CREATININE-BSD FRML MDRD: 41 ML/MIN/1.73SQ M
GLUCOSE SERPL-MCNC: 103 MG/DL (ref 65–140)
GLUCOSE UR STRIP-MCNC: NEGATIVE MG/DL
HCT VFR BLD AUTO: 38.8 % (ref 34.8–46.1)
HGB BLD-MCNC: 12.7 G/DL (ref 11.5–15.4)
HGB UR QL STRIP.AUTO: NEGATIVE
IMM GRANULOCYTES # BLD AUTO: 0.02 THOUSAND/UL (ref 0–0.2)
IMM GRANULOCYTES NFR BLD AUTO: 0 % (ref 0–2)
KETONES UR STRIP-MCNC: NEGATIVE MG/DL
LEUKOCYTE ESTERASE UR QL STRIP: ABNORMAL
LYMPHOCYTES # BLD AUTO: 1.34 THOUSANDS/ΜL (ref 0.6–4.47)
LYMPHOCYTES NFR BLD AUTO: 23 % (ref 14–44)
MCH RBC QN AUTO: 27.3 PG (ref 26.8–34.3)
MCHC RBC AUTO-ENTMCNC: 32.7 G/DL (ref 31.4–37.4)
MCV RBC AUTO: 83 FL (ref 82–98)
MONOCYTES # BLD AUTO: 0.35 THOUSAND/ΜL (ref 0.17–1.22)
MONOCYTES NFR BLD AUTO: 6 % (ref 4–12)
NEUTROPHILS # BLD AUTO: 4.12 THOUSANDS/ΜL (ref 1.85–7.62)
NEUTS SEG NFR BLD AUTO: 70 % (ref 43–75)
NITRITE UR QL STRIP: NEGATIVE
NON-SQ EPI CELLS URNS QL MICRO: ABNORMAL /HPF
PH UR STRIP.AUTO: 6.5 [PH]
PLATELET # BLD AUTO: 149 THOUSANDS/UL (ref 149–390)
PMV BLD AUTO: 11.9 FL (ref 8.9–12.7)
POTASSIUM SERPL-SCNC: 4 MMOL/L (ref 3.5–5)
PROT SERPL-MCNC: 6.9 G/DL (ref 6.4–8.3)
PROT UR STRIP-MCNC: NEGATIVE MG/DL
RBC # BLD AUTO: 4.65 MILLION/UL (ref 3.81–5.12)
RBC #/AREA URNS AUTO: ABNORMAL /HPF
SODIUM SERPL-SCNC: 141 MMOL/L (ref 133–145)
SP GR UR STRIP.AUTO: 1.01 (ref 1–1.03)
UROBILINOGEN UR QL STRIP.AUTO: 1 E.U./DL
WBC # BLD AUTO: 5.92 THOUSAND/UL (ref 4.31–10.16)
WBC #/AREA URNS AUTO: ABNORMAL /HPF

## 2020-09-06 PROCEDURE — 81001 URINALYSIS AUTO W/SCOPE: CPT | Performed by: PHYSICIAN ASSISTANT

## 2020-09-06 PROCEDURE — 85025 COMPLETE CBC W/AUTO DIFF WBC: CPT | Performed by: PHYSICIAN ASSISTANT

## 2020-09-06 PROCEDURE — 80053 COMPREHEN METABOLIC PANEL: CPT | Performed by: PHYSICIAN ASSISTANT

## 2020-09-06 PROCEDURE — 36415 COLL VENOUS BLD VENIPUNCTURE: CPT | Performed by: PHYSICIAN ASSISTANT

## 2020-09-06 PROCEDURE — 99283 EMERGENCY DEPT VISIT LOW MDM: CPT | Performed by: PHYSICIAN ASSISTANT

## 2020-09-06 PROCEDURE — 99284 EMERGENCY DEPT VISIT MOD MDM: CPT

## 2020-09-06 PROCEDURE — 71046 X-RAY EXAM CHEST 2 VIEWS: CPT

## 2020-09-06 RX ORDER — OXYCODONE HYDROCHLORIDE 5 MG/1
5 TABLET ORAL ONCE
Status: COMPLETED | OUTPATIENT
Start: 2020-09-06 | End: 2020-09-06

## 2020-09-06 RX ORDER — OXYCODONE HYDROCHLORIDE AND ACETAMINOPHEN 5; 325 MG/1; MG/1
1 TABLET ORAL EVERY 6 HOURS PRN
Qty: 12 TABLET | Refills: 0 | Status: SHIPPED | OUTPATIENT
Start: 2020-09-06 | End: 2020-09-09

## 2020-09-06 RX ADMIN — OXYCODONE HYDROCHLORIDE 5 MG: 5 TABLET ORAL at 18:08

## 2020-09-06 NOTE — DISCHARGE INSTRUCTIONS
Use Tylenol 650 mg every 4 hours or Anti-inflammatories like Advil, Motrin, Ibuprofen, Aleve every 6 hours; you can alternate the 2 medications taking something every 3 hours for pain, if no improvement add Percocet  Follow-up with your doctor or orthopedic doctor in the next few days if no improvement in condition
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
## 2020-09-08 NOTE — ED ATTENDING ATTESTATION
I supervised the Advanced Practitioner  I discussed the case with the Advanced Practitioner and reviewed the AP note, prescribed medications, and orders placed      Narendra Potter,  09/08/20

## 2020-09-15 ENCOUNTER — APPOINTMENT (EMERGENCY)
Dept: RADIOLOGY | Facility: HOSPITAL | Age: 55
End: 2020-09-15
Payer: COMMERCIAL

## 2020-09-15 ENCOUNTER — HOSPITAL ENCOUNTER (EMERGENCY)
Facility: HOSPITAL | Age: 55
Discharge: HOME | End: 2020-09-15
Attending: EMERGENCY MEDICINE | Admitting: EMERGENCY MEDICINE
Payer: COMMERCIAL

## 2020-09-15 VITALS
HEART RATE: 67 BPM | SYSTOLIC BLOOD PRESSURE: 170 MMHG | TEMPERATURE: 98.3 F | OXYGEN SATURATION: 100 % | HEIGHT: 68 IN | BODY MASS INDEX: 31.83 KG/M2 | WEIGHT: 210 LBS | DIASTOLIC BLOOD PRESSURE: 90 MMHG | RESPIRATION RATE: 18 BRPM

## 2020-09-15 DIAGNOSIS — M21.961 DEFORMITY OF METATARSAL, RIGHT: Primary | ICD-10-CM

## 2020-09-15 DIAGNOSIS — S69.92XA INJURY OF LEFT HAND, INITIAL ENCOUNTER: ICD-10-CM

## 2020-09-15 PROCEDURE — 73610 X-RAY EXAM OF ANKLE: CPT

## 2020-09-15 PROCEDURE — 29515 APPLICATION SHORT LEG SPLINT: CPT | Performed by: EMERGENCY MEDICINE

## 2020-09-15 PROCEDURE — 99283 EMERGENCY DEPT VISIT LOW MDM: CPT

## 2020-09-15 PROCEDURE — 73630 X-RAY EXAM OF FOOT: CPT

## 2020-09-15 PROCEDURE — 99284 EMERGENCY DEPT VISIT MOD MDM: CPT | Performed by: EMERGENCY MEDICINE

## 2020-09-15 PROCEDURE — 73130 X-RAY EXAM OF HAND: CPT

## 2020-09-15 RX ORDER — OXYCODONE HYDROCHLORIDE 5 MG/1
5 TABLET ORAL ONCE
Status: COMPLETED | OUTPATIENT
Start: 2020-09-15 | End: 2020-09-15

## 2020-09-15 RX ORDER — OXYCODONE HYDROCHLORIDE 5 MG/1
5 TABLET ORAL EVERY 6 HOURS PRN
Qty: 3 TABLET | Refills: 0 | Status: SHIPPED | OUTPATIENT
Start: 2020-09-15 | End: 2020-09-18 | Stop reason: HOSPADM

## 2020-09-15 RX ADMIN — OXYCODONE HYDROCHLORIDE 5 MG: 5 TABLET ORAL at 17:11
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
## 2020-09-15 NOTE — DISCHARGE INSTRUCTIONS
Your 3rd metatarsal bone in your foot is slightly deformed  This could be from previous surgery or new injury  Please do not put any pressure on the foot and use crutches  Please call your surgeon tomorrow and follow-up  Return to ER having any worsening pain, drainage, fevers, redness or feel worse overall

## 2020-09-16 ENCOUNTER — APPOINTMENT (EMERGENCY)
Dept: RADIOLOGY | Facility: HOSPITAL | Age: 55
End: 2020-09-16
Payer: COMMERCIAL

## 2020-09-16 ENCOUNTER — HOSPITAL ENCOUNTER (EMERGENCY)
Facility: HOSPITAL | Age: 55
End: 2020-09-17
Payer: COMMERCIAL

## 2020-09-16 DIAGNOSIS — R77.8 ELEVATED TROPONIN: Primary | ICD-10-CM

## 2020-09-16 DIAGNOSIS — I20.0 UNSTABLE ANGINA PECTORIS (HCC): ICD-10-CM

## 2020-09-16 LAB
ALBUMIN SERPL BCP-MCNC: 3.9 G/DL (ref 3.4–4.8)
ALP SERPL-CCNC: 69.1 U/L (ref 35–140)
ALT SERPL W P-5'-P-CCNC: 15 U/L (ref 5–54)
ANION GAP SERPL CALCULATED.3IONS-SCNC: 6 MMOL/L (ref 4–13)
AST SERPL W P-5'-P-CCNC: 20 U/L (ref 15–41)
BASOPHILS # BLD AUTO: 0.03 THOUSANDS/ΜL (ref 0–0.1)
BASOPHILS NFR BLD AUTO: 1 % (ref 0–1)
BILIRUB SERPL-MCNC: 0.28 MG/DL (ref 0.3–1.2)
BNP SERPL-MCNC: 728.2 PG/ML (ref 1–100)
BUN SERPL-MCNC: 21 MG/DL (ref 6–20)
CALCIUM SERPL-MCNC: 9.6 MG/DL (ref 8.4–10.2)
CHLORIDE SERPL-SCNC: 104 MMOL/L (ref 96–108)
CO2 SERPL-SCNC: 28 MMOL/L (ref 22–33)
CREAT SERPL-MCNC: 1.67 MG/DL (ref 0.4–1.1)
D DIMER PPP FEU-MCNC: 1.27 MG/L FEU (ref 0.19–0.49)
EOSINOPHIL # BLD AUTO: 0.07 THOUSAND/ΜL (ref 0–0.61)
EOSINOPHIL NFR BLD AUTO: 1 % (ref 0–6)
ERYTHROCYTE [DISTWIDTH] IN BLOOD BY AUTOMATED COUNT: 14.4 % (ref 11.6–15.1)
GFR SERPL CREATININE-BSD FRML MDRD: 34 ML/MIN/1.73SQ M
GLUCOSE SERPL-MCNC: 121 MG/DL (ref 65–140)
HCT VFR BLD AUTO: 39.2 % (ref 34.8–46.1)
HGB BLD-MCNC: 12.2 G/DL (ref 11.5–15.4)
IMM GRANULOCYTES # BLD AUTO: 0.01 THOUSAND/UL (ref 0–0.2)
IMM GRANULOCYTES NFR BLD AUTO: 0 % (ref 0–2)
LIPASE SERPL-CCNC: 104 U/L (ref 13–60)
LYMPHOCYTES # BLD AUTO: 1.88 THOUSANDS/ΜL (ref 0.6–4.47)
LYMPHOCYTES NFR BLD AUTO: 30 % (ref 14–44)
MCH RBC QN AUTO: 26.1 PG (ref 26.8–34.3)
MCHC RBC AUTO-ENTMCNC: 31.1 G/DL (ref 31.4–37.4)
MCV RBC AUTO: 84 FL (ref 82–98)
MONOCYTES # BLD AUTO: 0.4 THOUSAND/ΜL (ref 0.17–1.22)
MONOCYTES NFR BLD AUTO: 6 % (ref 4–12)
NEUTROPHILS # BLD AUTO: 3.95 THOUSANDS/ΜL (ref 1.85–7.62)
NEUTS SEG NFR BLD AUTO: 62 % (ref 43–75)
PLATELET # BLD AUTO: 135 THOUSANDS/UL (ref 149–390)
PMV BLD AUTO: 11 FL (ref 8.9–12.7)
POTASSIUM SERPL-SCNC: 3.7 MMOL/L (ref 3.5–5)
PROT SERPL-MCNC: 7.1 G/DL (ref 6.4–8.3)
RBC # BLD AUTO: 4.67 MILLION/UL (ref 3.81–5.12)
SODIUM SERPL-SCNC: 138 MMOL/L (ref 133–145)
TROPONIN I SERPL-MCNC: 0.08 NG/ML (ref 0–0.07)
WBC # BLD AUTO: 6.34 THOUSAND/UL (ref 4.31–10.16)

## 2020-09-16 PROCEDURE — 85379 FIBRIN DEGRADATION QUANT: CPT

## 2020-09-16 PROCEDURE — 83036 HEMOGLOBIN GLYCOSYLATED A1C: CPT | Performed by: NURSE PRACTITIONER

## 2020-09-16 PROCEDURE — 36415 COLL VENOUS BLD VENIPUNCTURE: CPT

## 2020-09-16 PROCEDURE — 71045 X-RAY EXAM CHEST 1 VIEW: CPT

## 2020-09-16 PROCEDURE — 80053 COMPREHEN METABOLIC PANEL: CPT

## 2020-09-16 PROCEDURE — 99285 EMERGENCY DEPT VISIT HI MDM: CPT

## 2020-09-16 PROCEDURE — 84443 ASSAY THYROID STIM HORMONE: CPT | Performed by: NURSE PRACTITIONER

## 2020-09-16 PROCEDURE — 84484 ASSAY OF TROPONIN QUANT: CPT

## 2020-09-16 PROCEDURE — 83690 ASSAY OF LIPASE: CPT

## 2020-09-16 PROCEDURE — 96374 THER/PROPH/DIAG INJ IV PUSH: CPT

## 2020-09-16 PROCEDURE — 85025 COMPLETE CBC W/AUTO DIFF WBC: CPT

## 2020-09-16 PROCEDURE — 83880 ASSAY OF NATRIURETIC PEPTIDE: CPT

## 2020-09-16 RX ORDER — ONDANSETRON 2 MG/ML
4 INJECTION INTRAMUSCULAR; INTRAVENOUS ONCE AS NEEDED
Status: DISCONTINUED | OUTPATIENT
Start: 2020-09-16 | End: 2020-09-17 | Stop reason: HOSPADM

## 2020-09-16 RX ORDER — SODIUM CHLORIDE 9 MG/ML
3 INJECTION INTRAVENOUS
Status: DISCONTINUED | OUTPATIENT
Start: 2020-09-16 | End: 2020-09-17 | Stop reason: HOSPADM

## 2020-09-16 RX ADMIN — NITROGLYCERIN 1 INCH: 20 OINTMENT TOPICAL at 22:10

## 2020-09-16 RX ADMIN — MORPHINE SULFATE 2 MG: 2 INJECTION, SOLUTION INTRAMUSCULAR; INTRAVENOUS at 22:10

## 2020-09-16 NOTE — ED NOTES
Pt discharge instructions reviewed by Provider derik vivas, Pt has no further questions at this time  Pt awake and alert, no signs of acute distress noted         Chong Zepeda RN  09/15/20 2004

## 2020-09-17 ENCOUNTER — HOSPITAL ENCOUNTER (OUTPATIENT)
Facility: HOSPITAL | Age: 55
Setting detail: OBSERVATION
LOS: 1 days | Discharge: HOME | End: 2020-09-18
Attending: INTERNAL MEDICINE | Admitting: INTERNAL MEDICINE
Payer: COMMERCIAL

## 2020-09-17 VITALS
WEIGHT: 210 LBS | HEART RATE: 61 BPM | SYSTOLIC BLOOD PRESSURE: 139 MMHG | BODY MASS INDEX: 32.96 KG/M2 | HEIGHT: 67 IN | TEMPERATURE: 98.2 F | RESPIRATION RATE: 18 BRPM | DIASTOLIC BLOOD PRESSURE: 64 MMHG | OXYGEN SATURATION: 98 %

## 2020-09-17 DIAGNOSIS — I21.4 NSTEMI (NON-ST ELEVATED MYOCARDIAL INFARCTION) (HCC): Primary | ICD-10-CM

## 2020-09-17 DIAGNOSIS — N18.30 BENIGN HYPERTENSION WITH CKD (CHRONIC KIDNEY DISEASE) STAGE III (HCC): ICD-10-CM

## 2020-09-17 DIAGNOSIS — I12.9 BENIGN HYPERTENSION WITH CKD (CHRONIC KIDNEY DISEASE) STAGE III (HCC): ICD-10-CM

## 2020-09-17 LAB
ANION GAP SERPL CALCULATED.3IONS-SCNC: 10 MMOL/L (ref 4–13)
ATRIAL RATE: 60 BPM
BUN SERPL-MCNC: 20 MG/DL (ref 5–25)
CALCIUM SERPL-MCNC: 8.7 MG/DL (ref 8.3–10.1)
CHLORIDE SERPL-SCNC: 104 MMOL/L (ref 100–108)
CHOLEST SERPL-MCNC: 133 MG/DL (ref 50–200)
CO2 SERPL-SCNC: 24 MMOL/L (ref 21–32)
CREAT SERPL-MCNC: 1.6 MG/DL (ref 0.6–1.3)
ERYTHROCYTE [DISTWIDTH] IN BLOOD BY AUTOMATED COUNT: 14.5 % (ref 11.6–15.1)
EST. AVERAGE GLUCOSE BLD GHB EST-MCNC: 108 MG/DL
GFR SERPL CREATININE-BSD FRML MDRD: 36 ML/MIN/1.73SQ M
GLUCOSE SERPL-MCNC: 109 MG/DL (ref 65–140)
HBA1C MFR BLD: 5.4 %
HCT VFR BLD AUTO: 36.3 % (ref 34.8–46.1)
HDLC SERPL-MCNC: 43 MG/DL
HGB BLD-MCNC: 11.1 G/DL (ref 11.5–15.4)
LDLC SERPL CALC-MCNC: 72 MG/DL (ref 0–100)
MAGNESIUM SERPL-MCNC: 2.2 MG/DL (ref 1.6–2.6)
MCH RBC QN AUTO: 26.7 PG (ref 26.8–34.3)
MCHC RBC AUTO-ENTMCNC: 30.6 G/DL (ref 31.4–37.4)
MCV RBC AUTO: 88 FL (ref 82–98)
P AXIS: 44 DEGREES
PLATELET # BLD AUTO: 112 THOUSANDS/UL (ref 149–390)
PMV BLD AUTO: 11.4 FL (ref 8.9–12.7)
POTASSIUM SERPL-SCNC: 3.9 MMOL/L (ref 3.5–5.3)
PR INTERVAL: 188 MS
QRS AXIS: -42 DEGREES
QRSD INTERVAL: 160 MS
QT INTERVAL: 504 MS
QTC INTERVAL: 504 MS
RBC # BLD AUTO: 4.15 MILLION/UL (ref 3.81–5.12)
SODIUM SERPL-SCNC: 138 MMOL/L (ref 136–145)
T WAVE AXIS: 132 DEGREES
TRIGL SERPL-MCNC: 89 MG/DL
TROPONIN I SERPL-MCNC: 0.06 NG/ML
TROPONIN I SERPL-MCNC: 0.07 NG/ML
TROPONIN I SERPL-MCNC: 0.09 NG/ML
TSH SERPL DL<=0.05 MIU/L-ACNC: 5.45 UIU/ML (ref 0.34–5.6)
VENTRICULAR RATE: 60 BPM
WBC # BLD AUTO: 5.2 THOUSAND/UL (ref 4.31–10.16)

## 2020-09-17 PROCEDURE — 80061 LIPID PANEL: CPT | Performed by: NURSE PRACTITIONER

## 2020-09-17 PROCEDURE — 85027 COMPLETE CBC AUTOMATED: CPT | Performed by: NURSE PRACTITIONER

## 2020-09-17 PROCEDURE — 93010 ELECTROCARDIOGRAM REPORT: CPT | Performed by: INTERNAL MEDICINE

## 2020-09-17 PROCEDURE — 84484 ASSAY OF TROPONIN QUANT: CPT | Performed by: FAMILY MEDICINE

## 2020-09-17 PROCEDURE — 84484 ASSAY OF TROPONIN QUANT: CPT | Performed by: NURSE PRACTITIONER

## 2020-09-17 PROCEDURE — G0379 DIRECT REFER HOSPITAL OBSERV: HCPCS

## 2020-09-17 PROCEDURE — 83735 ASSAY OF MAGNESIUM: CPT | Performed by: NURSE PRACTITIONER

## 2020-09-17 PROCEDURE — 93005 ELECTROCARDIOGRAM TRACING: CPT

## 2020-09-17 PROCEDURE — 99220 PR INITIAL OBSERVATION CARE/DAY 70 MINUTES: CPT | Performed by: FAMILY MEDICINE

## 2020-09-17 PROCEDURE — 99244 OFF/OP CNSLTJ NEW/EST MOD 40: CPT | Performed by: INTERNAL MEDICINE

## 2020-09-17 PROCEDURE — 80048 BASIC METABOLIC PNL TOTAL CA: CPT | Performed by: NURSE PRACTITIONER

## 2020-09-17 RX ORDER — NICOTINE 21 MG/24HR
1 PATCH, TRANSDERMAL 24 HOURS TRANSDERMAL DAILY
Status: DISCONTINUED | OUTPATIENT
Start: 2020-09-17 | End: 2020-09-18 | Stop reason: HOSPADM

## 2020-09-17 RX ORDER — PANTOPRAZOLE SODIUM 40 MG/1
40 TABLET, DELAYED RELEASE ORAL
Status: DISCONTINUED | OUTPATIENT
Start: 2020-09-17 | End: 2020-09-18 | Stop reason: HOSPADM

## 2020-09-17 RX ORDER — OXYCODONE HYDROCHLORIDE 5 MG/1
5 TABLET ORAL EVERY 6 HOURS PRN
Status: DISCONTINUED | OUTPATIENT
Start: 2020-09-17 | End: 2020-09-18 | Stop reason: HOSPADM

## 2020-09-17 RX ORDER — LISINOPRIL 10 MG/1
10 TABLET ORAL DAILY
Status: DISCONTINUED | OUTPATIENT
Start: 2020-09-17 | End: 2020-09-17

## 2020-09-17 RX ORDER — FUROSEMIDE 20 MG/1
20 TABLET ORAL DAILY
Status: DISCONTINUED | OUTPATIENT
Start: 2020-09-17 | End: 2020-09-18 | Stop reason: HOSPADM

## 2020-09-17 RX ORDER — LISINOPRIL 20 MG/1
20 TABLET ORAL DAILY
Status: DISCONTINUED | OUTPATIENT
Start: 2020-09-18 | End: 2020-09-18 | Stop reason: HOSPADM

## 2020-09-17 RX ORDER — ACETAMINOPHEN 325 MG/1
650 TABLET ORAL EVERY 6 HOURS PRN
Status: DISCONTINUED | OUTPATIENT
Start: 2020-09-17 | End: 2020-09-18 | Stop reason: HOSPADM

## 2020-09-17 RX ORDER — HYDRALAZINE HYDROCHLORIDE 20 MG/ML
5 INJECTION INTRAMUSCULAR; INTRAVENOUS EVERY 6 HOURS PRN
Status: DISCONTINUED | OUTPATIENT
Start: 2020-09-17 | End: 2020-09-18 | Stop reason: HOSPADM

## 2020-09-17 RX ADMIN — METOPROLOL SUCCINATE 150 MG: 100 TABLET, EXTENDED RELEASE ORAL at 09:19

## 2020-09-17 RX ADMIN — LISINOPRIL 10 MG: 10 TABLET ORAL at 09:19

## 2020-09-17 RX ADMIN — ACETAMINOPHEN 650 MG: 325 TABLET, FILM COATED ORAL at 02:24

## 2020-09-17 RX ADMIN — PANTOPRAZOLE SODIUM 40 MG: 40 TABLET, DELAYED RELEASE ORAL at 05:01

## 2020-09-17 RX ADMIN — ACETAMINOPHEN 650 MG: 325 TABLET, FILM COATED ORAL at 17:58

## 2020-09-17 RX ADMIN — OXYCODONE HYDROCHLORIDE 5 MG: 5 TABLET ORAL at 12:57

## 2020-09-17 RX ADMIN — METOPROLOL SUCCINATE 150 MG: 100 TABLET, EXTENDED RELEASE ORAL at 17:58

## 2020-09-17 RX ADMIN — OXYCODONE HYDROCHLORIDE 5 MG: 5 TABLET ORAL at 20:54

## 2020-09-17 RX ADMIN — RIVAROXABAN 20 MG: 20 TABLET, FILM COATED ORAL at 09:19

## 2020-09-17 RX ADMIN — NICOTINE 1 PATCH: 14 PATCH TRANSDERMAL at 09:20

## 2020-09-17 RX ADMIN — FUROSEMIDE 20 MG: 20 TABLET ORAL at 09:20

## 2020-09-17 NOTE — EMTALA/ACUTE CARE TRANSFER
[facility] EMERGENCY DEPARTMENT  [address] [facility] [address]  Dept: [phone]      EMTALA TRANSFER CONSENT    NAME Faith Martinez                                         1965                              MRN [id number]    I have been informed of my rights regarding examination, treatment, and transfer   by Dr Mouna Vanegas MD    Benefits: Specialized equipment and/or services available at the receiving facility (Include comment)________________________    Risks: Potential for delay in receiving treatment, Potential deterioration of medical condition, Loss of IV, Increased discomfort during transfer, Possible worsening of condition or death during transfer      Transfer Request   I acknowledge that my medical condition has been evaluated and explained to me by the emergency department physician or other qualified medical person and/or my attending physician who has recommended and offered to me further medical examination and treatment  I understand the Hospital's obligation with respect to the treatment and stabilization of my emergency medical condition  I nevertheless request to be transferred  I release the Hospital, the doctor, and any other persons caring for me from all responsibility or liability for any injury or ill effects that may result from my transfer and agree to accept all responsibility for the consequences of my choice to transfer, rather than receive stabilizing treatment at the Hospital  I understand that because the transfer is my request, my insurance may not provide reimbursement for the services  The Hospital will assist and direct me and my family in how to make arrangements for transfer, but the hospital is not liable for any fees charged by the transport service    In spite of this understanding, I refuse to consent to further medical examination and treatment which has been offered to me, and request transfer to  Vicky  Name, [facility] : Elenita Sagastume  I authorize the performance of emergency medical procedures and treatments upon me in both transit and upon arrival at the receiving facility  Additionally, I authorize the release of any and all medical records to the receiving facility and request they be transported with me, if possible  I authorize the performance of emergency medical procedures and treatments upon me in both transit and upon arrival at the receiving facility  Additionally, I authorize the release of any and all medical records to the receiving facility and request they be transported with me, if possible  I understand that the safest mode of transportation during a medical emergency is an ambulance and that the Hospital advocates the use of this mode of transport  Risks of traveling to the receiving facility by car, including absence of medical control, life sustaining equipment, such as oxygen, and medical personnel has been explained to me and I fully understand them  (MAREK CORRECT BOX BELOW)  [  ]  I consent to the stated transfer and to be transported by ambulance/helicopter  [  ]  I consent to the stated transfer, but refuse transportation by ambulance and accept full responsibility for my transportation by car  I understand the risks of non-ambulance transfers and I exonerate the Hospital and its staff from any deterioration in my condition that results from this refusal     X___________________________________________    DATE  20  TIME________  Signature of patient or legally responsible individual signing on patient behalf           RELATIONSHIP TO PATIENT_________________________          Provider Certification    NAME Lina Rubalcava                                        [facility] 1965                              MRN [id number]    A medical screening exam was performed on the above named patient    Based on the examination:    Condition Necessitating Transfer The primary encounter diagnosis was Elevated troponin  A diagnosis of Unstable angina pectoris (HCC) was also pertinent to this visit  Patient Condition: The patient has been stabilized such that within reasonable medical probability, no material deterioration of the patient condition or the condition of the unborn child(cheryl) is likely to result from the transfer    Reason for Transfer: Level of Care needed not available at this facility    Transfer Requirements: dArienne   · Space available and qualified personnel available for treatment as acknowledged by    · Agreed to accept transfer and to provide appropriate medical treatment as acknowledged by       [employer]  · Appropriate medical records of the examination and treatment of the patient are provided at the time of transfer   [address] [facility], [address] _______  · Transfer will be performed by qualified personnel from    and appropriate transfer equipment as required, including the use of necessary and appropriate life support measures      Provider Certification: I have examined the patient and explained the following risks and benefits of being transferred/refusing transfer to the patient/family:  General risk, such as traffic hazards, adverse weather conditions, rough terrain or turbulence, possible failure of equipment (including vehicle or aircraft), or consequences of actions of persons outside the control of the transport personnel, Unanticipated needs of medical equipment and personnel during transport, Risk of worsening condition, The possibility of a transport vehicle being unavailable      Based on these reasonable risks and benefits to the patient and/or the unborn child(cheryl), and based upon the information available at the time of the patients examination, I certify that the medical benefits reasonably to be expected from the provision of appropriate medical treatments at another medical facility outweigh the increasing risks, if any, to the individuals medical condition, and in the case of labor to the unborn child, from effecting the transfer      X____________________________________________ DATE 09/16/20        TIME_______      ORIGINAL - SEND TO MEDICAL RECORDS   COPY - SEND WITH PATIENT DURING TRANSFER

## 2020-09-17 NOTE — ASSESSMENT & PLAN NOTE
 Patient Presentation:  Patient presents with complaints of chest pain that began yesterday after she woke up from a nap  Patient states that when she woke up from her nap she was diaphoretic and experienced midsternal chest pain that radiated to her left arm and left jaw   Likely etiology:  ACS versus hypertensive urgency versus anxiety versus musculoskeletal    BRITTA: 3   Initial Troponin: 0 08  o Trend x3 or to peak   Initial EKG:  Sinus rhythm, right bundle-branch block, borderline prolonged QT interval, heart rate 66    o Monitor on telemetry   Pain Control:  Patient received nitropaste and 1 time dose of IV morphine in [address] [place] [address],[address] ER   Check fasting lipid panel and A1c  Dyana Belch Cardiology consult   NPO

## 2020-09-17 NOTE — PLAN OF CARE
Problem: Potential for Falls  Goal: Patient will remain free of falls  Description: INTERVENTIONS:  - Assess patient frequently for physical needs  -  Identify cognitive and physical deficits and behaviors that affect risk of falls    -  [place] fall precautions as indicated by assessment   - Educate patient/family on patient safety including physical limitations  - Instruct patient to call for assistance with activity based on assessment  - Modify environment to reduce risk of injury  - Consider OT/PT consult to assist with strengthening/mobility  9/17/2020 1712 by Janelle Guerrero RN  Outcome: Progressing  9/17/2020 1711 by Janelle Guerrero RN  Outcome: Progressing

## 2020-09-17 NOTE — ASSESSMENT & PLAN NOTE
Wt Readings from Last 3 Encounters:   09/17/20 95 3 kg (210 lb)   09/16/20 95 3 kg (210 lb)   09/15/20 95 3 kg (210 lb)    Appears compensated at this time   HUBER from May 8946: "LV systolic function was normal   Ejection fraction was estimated to be 60%  Wall thickness was moderately increased  There was moderate concentric hypertrophy "   Monitor intake and output, daily weights   Low sodium diet and fluid restriction   Continue home medications

## 2020-09-17 NOTE — ASSESSMENT & PLAN NOTE
· Patient known to Dr Shadi Tyson of cardiology  · Status post ablation with cryo PVI in May 2020  · Rate/Rhythm Control:  Toprol XL  · Antiplatelet/Anticoagulation:  Xarelto
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
## 2020-09-17 NOTE — ASSESSMENT & PLAN NOTE
 Patient hypertensive on arrival to [facility]  Blood pressure 178/98   Continue lisinopril, metoprolol succinate, and furosemide   Monitor blood pressure   IV Hydralazine p r n
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
## 2020-09-17 NOTE — UTILIZATION REVIEW
Initial Clinical Review    Admission: Date/Time/Statement:   Admission Orders (From admission, onward)     Ordered        09/17/20 0217  Place in Observation  Once                   Orders Placed This Encounter   Procedures    Place in Observation     Standing Status:   Standing     Number of Occurrences:   1     Order Specific Question:   Admitting Physician     Answer:   [address], [address]     Order Specific Question:   Level of Care     Answer:   Med Surg [16]     Transfer from [facility] ED to [address] [place]    Assessment/Plan: 53 yo female with PMH AFib, history of V-tach status post AICD, hypertrophic cardiomyopathy, tobacco abuse, hypertension, GERD, CKD and chronic CHF  Initially presented to ED @ [facility] with Chest pain that began yesterday after she awoke from a nap  She was diaphoretic, and   midsternal chest pain that radiated to her left arm and left jaw  Elevated trops were noted in the ED and due to significant cardiac history, nature of her pain and elevated troponin there was concern for non STEMI  Rec'd nitropaste and 1 time dose of IV morphine in ER  Pt  [facility] [place]   Admitted to Observation status with Chest Pain  -NSTEMI , HTN  Plan: Tele, Cardio Consult, ACS r/o, NPO  Continue lisinopril, metoprolol succinate, and furosemide, monitor BP, prn Hydralazine  Chronic CHF -Monitor intake and output, daily weights , Low sodium diet and fluid restriction  Per Cardio: Non-MI  troponin elevation  Currently in sinus or an atrial paced rhythm  Likely driven by accelerated hypertension  Cardiac catheterization last year showed no significant obstructive CAD  Hypertensive urgency    Plan ECHO, ICD interrogation, Increase lisinopril to 20 mg daily    Admission  Vitals [09/17/20 0205]   Temperature Pulse Respirations Blood Pressure SpO2   98 °F (36 7 °C) 60 17 (!) 178/98 96 %      Temp Source Heart Rate Source Patient Position - Orthostatic VS BP Location FiO2 (%)   Oral -- Sitting Right arm --      Pain Score       2          Wt Readings from Last 1 Encounters:   09/17/20 98 2 kg (216 lb 7 9 oz)     Additional Vital Signs:   09/17/20 1500   97 7 °F (36 5 °C)   60   18      97 %   None (Room air)      09/17/20 0919            158/88            09/17/20 0700   97 7 °F (36 5 °C)   60   18   170/78   95 %   None (Room air)  Lying    09/17/20 0300      54Abnormal        123/68        Lying        Pertinent Labs/Diagnostic Test Results:   Results from last 7 days   Lab Units 09/17/20  0507 09/16/20  2208   WBC Thousand/uL 5 20 6 34   HEMOGLOBIN g/dL 11 1* 12 2   HEMATOCRIT % 36 3 39 2   PLATELETS Thousands/uL 112* 135*   NEUTROS ABS Thousands/µL  --  3 95     Results from last 7 days   Lab Units 09/17/20  0507 09/16/20  2208   SODIUM mmol/L 138 138   POTASSIUM mmol/L 3 9 3 7   CHLORIDE mmol/L 104 104   CO2 mmol/L 24 28   ANION GAP mmol/L 10 6   BUN mg/dL 20 21*   CREATININE mg/dL 1 60* 1 67*   EGFR ml/min/1 73sq m 36 34   CALCIUM mg/dL 8 7 9 6   MAGNESIUM mg/dL 2 2  --      Results from last 7 days   Lab Units 09/16/20  2208   AST U/L 20   ALT U/L 15   ALK PHOS U/L 69 1   TOTAL PROTEIN g/dL 7 1   ALBUMIN g/dL 3 9   TOTAL BILIRUBIN mg/dL 0 28*     Results from last 7 days   Lab Units 09/17/20  0507 09/16/20 2208   GLUCOSE RANDOM mg/dL 109 121     Results from last 7 days   Lab Units 09/16/20  2208   HEMOGLOBIN A1C % 5 4   EAG mg/dl 108     Results from last 7 days   Lab Units 09/17/20  0859 09/17/20  0507 09/17/20  0224 09/16/20  2208   TROPONIN I ng/mL 0 07* 0 06* 0 09* 0 08*     Results from last 7 days   Lab Units 09/16/20 2208   D-DIMER QUANTITATIVE mg/L FEU 1 27*     Results from last 7 days   Lab Units 09/16/20  2208   TSH 3RD GENERATON uIU/mL 5 451     Results from last 7 days   Lab Units 09/16/20  2208   BNP pg/mL 728 2*     Results from last 7 days   Lab Units 09/16/20  2208   LIPASE u/L 104*     9/16 EKG:  Sinus rhythm, right bundle branch block, borderline prolonged QT interval, heart rate 66      ECHO Pending    Past Medical History:   Diagnosis Date    Arrhythmia     Arthritis     Atrial fibrillation (HCC)     Breast lump     CKD (chronic kidney disease) stage 3, GFR 30-59 ml/min (HCC)     Disease of thyroid gland     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 ([facility] ) 5/25/2020    GERD (gastroesophageal reflux disease)     H/O transfusion 1987    Hepatitis C     resolved    Hepatitis C     Hyperlipidemia     Hypertension     Irregular heart beat     Pacemaker     Sleep apnea     no cpap    Tachycardia      Present on Admission:   Atrial fibrillation (HCC)   Gastroesophageal reflux disease without esophagitis   Nicotine dependence   Chronic diastolic CHF (congestive heart failure) (HCC)   Stage 3 chronic kidney disease ([facility] )   NSTEMI (non-ST elevated myocardial infarction) ([facility])   Benign hypertension with CKD (chronic kidney disease) stage III ([facility])      Admitting Diagnosis: Chest pain, unspecified [R07 9]  Age/Sex: 54 y o  female  Admission Orders:  Scheduled Medications:  furosemide, 20 mg, Oral, Daily  [START ON 9/18/2020] lisinopril, 20 mg, Oral, Daily  metoprolol succinate, 150 mg, Oral, BID  nicotine, 1 patch, Transdermal, Daily  pantoprazole, 40 mg, Oral, Early Morning  rivaroxaban, 20 mg, Oral, Daily With Breakfast    Continuous IV Infusions:     PRN Meds:  acetaminophen, 650 mg, Oral, Q6H PRN x 1 9/17  hydrALAZINE, 5 mg, Intravenous, Q6H PRN  oxyCODONE, 5 mg, Oral, Q6H PRN  X 1 9/17    Tele  SCDs  CPAP q hs  Pacer Interrogation   IP CONSULT TO CARDIOLOGY    Network Utilization Review Department  [email or web address]  ATTENTION: Please call with any questions or concerns to [phone] and carefully listen to the prompts so that you are directed to the right person   All voicemails are confidential   Chetna Bragg all requests for admission clinical reviews, approved or denied determinations and any other requests to dedicated fax number below belonging to the campus where the patient is receiving treatment   List of dedicated fax numbers for the Facilities:  [address] DENIALS (Administrative/Medical Necessity) [phone]   [address] (Maternity/NICU/Pediatrics) [phone]   Crystal Hernandez [phone]   [place] [phone]   Salty Watson [phone]   Joelle Healy [phone]   [address] [place] [address] [phone]   [facility]  [phone]   [address] [facility]  [address] [facility] [address] [facility] [phone]

## 2020-09-17 NOTE — ED NOTES
Provider at bedside to explain plan of care to patient at this time     Trino Manning RN  09/16/20 6546
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
## 2020-09-18 ENCOUNTER — APPOINTMENT (OUTPATIENT)
Dept: NON INVASIVE DIAGNOSTICS | Facility: HOSPITAL | Age: 55
End: 2020-09-18
Payer: COMMERCIAL

## 2020-09-18 VITALS
OXYGEN SATURATION: 96 % | BODY MASS INDEX: 33.67 KG/M2 | HEART RATE: 61 BPM | SYSTOLIC BLOOD PRESSURE: 162 MMHG | RESPIRATION RATE: 18 BRPM | TEMPERATURE: 97.7 F | DIASTOLIC BLOOD PRESSURE: 91 MMHG | WEIGHT: 215 LBS

## 2020-09-18 PROCEDURE — 93306 TTE W/DOPPLER COMPLETE: CPT | Performed by: INTERNAL MEDICINE

## 2020-09-18 PROCEDURE — 99214 OFFICE O/P EST MOD 30 MIN: CPT | Performed by: INTERNAL MEDICINE

## 2020-09-18 PROCEDURE — 93306 TTE W/DOPPLER COMPLETE: CPT

## 2020-09-18 PROCEDURE — 99217 PR OBSERVATION CARE DISCHARGE MANAGEMENT: CPT | Performed by: FAMILY MEDICINE

## 2020-09-18 RX ORDER — LISINOPRIL 20 MG/1
20 TABLET ORAL DAILY
Qty: 30 TABLET | Refills: 0 | Status: SHIPPED | OUTPATIENT
Start: 2020-09-19 | End: 2020-11-10

## 2020-09-18 RX ORDER — ATORVASTATIN CALCIUM 10 MG/1
10 TABLET, FILM COATED ORAL DAILY
Qty: 30 TABLET | Refills: 0 | Status: CANCELLED | OUTPATIENT
Start: 2020-09-18

## 2020-09-18 RX ADMIN — LISINOPRIL 20 MG: 20 TABLET ORAL at 08:14

## 2020-09-18 RX ADMIN — PERFLUTREN 0.6 ML/MIN: 6.52 INJECTION, SUSPENSION INTRAVENOUS at 12:12

## 2020-09-18 RX ADMIN — FUROSEMIDE 20 MG: 20 TABLET ORAL at 08:14

## 2020-09-18 RX ADMIN — PANTOPRAZOLE SODIUM 40 MG: 40 TABLET, DELAYED RELEASE ORAL at 06:23

## 2020-09-18 RX ADMIN — OXYCODONE HYDROCHLORIDE 5 MG: 5 TABLET ORAL at 05:35

## 2020-09-18 RX ADMIN — RIVAROXABAN 20 MG: 20 TABLET, FILM COATED ORAL at 08:15

## 2020-09-18 RX ADMIN — METOPROLOL SUCCINATE 150 MG: 100 TABLET, EXTENDED RELEASE ORAL at 08:14

## 2020-09-18 RX ADMIN — NICOTINE 1 PATCH: 14 PATCH TRANSDERMAL at 08:14

## 2020-09-18 NOTE — DISCHARGE SUMMARY
Discharge- Loreta Odonnell 1965, 54 y o  female MRN: [id number]    Unit/Bed#: S -01 Encounter: [id number]    Primary Care Provider: Ryder Mcgarry MD   Date and time admitted to hospital: 9/17/2020  2:16 AM    Benign hypertension with CKD (chronic kidney disease) stage III ([facility] )  Assessment & Plan   Blood pressure 162/91   Continue lisinopril, metoprolol succinate, and furosemide   Cardiology added verapamil   Monitor blood pressure  Chronic diastolic CHF (congestive heart failure) ([facility])  Assessment & Plan  Wt Readings from Last 3 Encounters:   09/18/20 97 5 kg (215 lb)   09/16/20 95 3 kg (210 lb)   09/15/20 95 3 kg (210 lb)    Appears compensated at this time   HUBER from May 5682: "LV systolic function was normal   Ejection fraction was estimated to be 60%  Wall thickness was moderately increased  There was moderate concentric hypertrophy "   Monitor intake and output, daily weights   Low sodium diet and fluid restriction   Continue home medications  Stage 3 chronic kidney disease ([facility] )  Assessment & Plan   Creatinine upon admission: 1 67  o Baseline: 1 5-1 7   Monitor BMP  NSTEMI (non-ST elevated myocardial infarction) [facility])  Assessment & Plan   Patient Presentation:  Patient presents with complaints of chest pain that began yesterday after she woke up from a nap  Patient states that when she woke up from her nap she was diaphoretic and experienced midsternal chest pain that radiated to her left arm and left jaw   Likely etiology:  ACS versus hypertensive urgency versus anxiety versus musculoskeletal    BRITTA: 3  o Troponins trended and stable   Initial EKG:  Sinus rhythm, right bundle-branch block, borderline prolonged QT interval, heart rate 66    o Monitor on telemetry   Pain Control:  Patient received nitropaste and 1 time dose of IV morphine in [facility] [facility] ER   Check fasting lipid panel and A1c  [facility] consult     Echocardiogram 9/18    Nicotine dependence  Assessment & Plan  · Encouraged smoking cessation  · Nicotine patch  ICD (implantable cardioverter-defibrillator) discharge  Assessment & Plan  · Patient known to Dr Reji Brownlee of cardiology  · Cardiology following  · In-situ  Gastroesophageal reflux disease without esophagitis  Assessment & Plan  · Continue Protonix  * Atrial fibrillation [facility])  Assessment & Plan  · Patient known to Dr Reji Brownlee of cardiology  · Status post ablation with cryo PVI in May 2020  · Rate/Rhythm Control:  Toprol XL  · Antiplatelet/Anticoagulation:  Xarelto  Discharging Resident Physician: Ayan Mendiola MD  Attending: Ochoa Edwards MD  PCP: Grover Wheatley MD  Admission Date: 9/17/2020  Discharge Date: 09/18/20    Disposition:     Home    Reason for Admission:  Chest pain    Consultations During Hospital Stay:  · Cardiology    Procedures Performed:     · Echocardiogram    Significant Findings / Test Results:     · Elevated troponin 0 08-0 0 9-0 06  · EKG showed right bundle branch block    Incidental Findings:   · None     Test Results Pending at Discharge (will require follow up): · Echocardiogram     Outpatient Tests Requested:  · Follow-up with cardiology    Complications:  None    Hospital Course:     Nacho Ibrahim is a 54 y o  female patient who originally presented to the hospital on 9/17/2020 due to chest pain  Past medical history of AFib, V-tach status post AICD, hypertrophic cardiomyopathy, tobacco abuse, hypertension, GERD, CKD and chronic CHF  Initially presented with chest pain began on 09/16  Reported waking up from a nap and experience midsternal chest pain that radiated to her left arm and left jaw  Patient had a headache  Denied nausea vomiting shortness of breath fever chills or lightheadedness  Initially went to [facility]  Elevated troponin noted at [facility] and transferred [facility] for cardiology consult      Troponins were trended at [facility] and were stable  Cardiology was consulted  They added verapamil for hypertension  Cardiology also ordered an echocardiogram which showed  No change  Patient is medically stable for discharge and should follow-up with cardiology and PCP in the outpatient setting  Condition at Discharge: good     Discharge Day Visit / Exam:     Subjective:  Patient states she is ready to go home  Patient currently in NAD  Plan is to obtain echocardiogram   Patient understands and agrees with plan  Denies chest pain, shortness of breath, palpitations  Review of systems unremarkable  Vitals: Blood Pressure: 162/91 (09/18/20 0745)  Pulse: 61 (09/18/20 0745)  Temperature: 97 7 °F (36 5 °C) (09/18/20 0745)  Temp Source: Oral (09/18/20 0745)  Respirations: 18 (09/18/20 0745)  Weight - Scale: 97 5 kg (215 lb) (09/18/20 0600)  SpO2: 96 % (09/18/20 0745)  Exam:   Physical Exam  Vitals signs and nursing note reviewed  Constitutional:       Appearance: She is well-developed  HENT:      Head: Normocephalic and atraumatic  Nose: Nose normal    Eyes:      Extraocular Movements: Extraocular movements intact  Pupils: Pupils are equal, round, and reactive to light  Neck:      Musculoskeletal: Normal range of motion and neck supple  Cardiovascular:      Rate and Rhythm: Normal rate and regular rhythm  Pulmonary:      Effort: Pulmonary effort is normal       Breath sounds: Normal breath sounds  Abdominal:      General: Bowel sounds are normal       Palpations: Abdomen is soft  Musculoskeletal: Normal range of motion  Skin:     General: Skin is warm and dry  Neurological:      Mental Status: She is alert and oriented to person, place, and time  Psychiatric:         Behavior: Behavior normal        Discussion with Family:  Patient    Discharge instructions/Information to patient and family:   See after visit summary for information provided to patient and family        Provisions for Follow-Up Care:  See after visit summary for information related to follow-up care and any pertinent home health orders  Planned Readmission:  None     Discharge Medications:  See after visit summary for reconciled discharge medications provided to patient and family        ** Please Note: This note has been constructed using a voice recognition system **

## 2020-09-18 NOTE — PLAN OF CARE
Problem: Potential for Falls  Goal: Patient will remain free of falls  Description: INTERVENTIONS:  - Assess patient frequently for physical needs  -  Identify cognitive and physical deficits and behaviors that affect risk of falls    -  [place] fall precautions as indicated by assessment   - Educate patient/family on patient safety including physical limitations  - Instruct patient to call for assistance with activity based on assessment  - Modify environment to reduce risk of injury  - Consider OT/PT consult to assist with strengthening/mobility  9/18/2020 1315 by Fariha Cyr RN  Outcome: Completed  9/18/2020 0948 by Fariha Cyr RN  Outcome: Progressing     Problem: CARDIOVASCULAR - ADULT  Goal: Maintains optimal cardiac output and hemodynamic stability  Description: INTERVENTIONS:  - Monitor I/O, vital signs and rhythm  - Monitor for S/S and trends of decreased cardiac output  - Administer and titrate ordered vasoactive medications to optimize hemodynamic stability  - Assess quality of pulses, skin color and temperature  - Assess for signs of decreased coronary artery perfusion  - Instruct patient to report change in severity of symptoms  9/18/2020 1315 by Fariha Cyr RN  Outcome: Completed  9/18/2020 0948 by Fariha Cyr RN  Outcome: Progressing  Goal: Absence of cardiac dysrhythmias or at baseline rhythm  Description: INTERVENTIONS:  - Continuous cardiac monitoring, vital signs, obtain 12 lead EKG if ordered  - Administer antiarrhythmic and heart rate control medications as ordered  - Monitor electrolytes and administer replacement therapy as ordered  9/18/2020 1315 by Fariha Cyr RN  Outcome: Completed  9/18/2020 0948 by Fariha Cyr RN  Outcome: Progressing     Problem: RESPIRATORY - ADULT  Goal: Achieves optimal ventilation and oxygenation  Description: INTERVENTIONS:  - Assess for changes in respiratory status  - Assess for changes in mentation and behavior  - Position to facilitate oxygenation and minimize respiratory effort  - Oxygen administered by appropriate delivery if ordered  - Initiate smoking cessation education as indicated  - Encourage broncho-pulmonary hygiene including cough, deep breathe, Incentive Spirometry  - Assess the need for suctioning and aspirate as needed  - Assess and instruct to report SOB or any respiratory difficulty  - Respiratory Therapy support as indicated  9/18/2020 1315 by Philomena Yu RN  Outcome: Completed  9/18/2020 0948 by Philomena Yu RN  Outcome: Progressing

## 2020-09-18 NOTE — DISCHARGE INSTRUCTIONS
A-fib (Atrial Fibrillation)   WHAT YOU NEED TO KNOW:   A-fib may come and go, or it may be a long-term condition  A-fib can cause blood clots, stroke, or heart failure  These conditions may become life-threatening  It is important to treat and manage a-fib to help prevent a blood clot, stroke, or heart failure  WHILE YOU ARE HERE:   Informed consent  is a legal document that explains the tests, treatments, or procedures that you may need  Informed consent means you understand what will be done and can make decisions about what you want  You give your permission when you sign the consent form  You can have someone sign this form for you if you are not able to sign it  You have the right to understand your medical care in words you know  Before you sign the consent form, understand the risks and benefits of what will be done  Make sure all your questions are answered  Medicines:   · Antiarrhythmics  help make your heart rhythm regular  · Beta-blockers  help slow your heartbeat  · Calcium channel blockers  help slow your heartbeat  · Blood thinners  help prevent blood clots  Blood thinners may be given before, during, and after a surgery or procedure  Blood thinners make it more likely for you to bleed or bruise  Monitoring:   · Pulse oximetry  measures how much oxygen is in your blood  · Telemetry  is continuous monitoring of your heart rhythm  Sticky pads placed on your skin connect to an EKG machine that records your heart rate and rhythm  Tests:   · A chest x-ray  shows the structure of your heart and lungs  It may show if another condition is causing your symptoms  · Blood and urine tests  check for infection, potassium and calcium levels, and thyroid function  · An EKG  records your heart rhythm and how fast your heart beats  · An echocardiogram  is a type of ultrasound  Sound waves are used to show the structure and function of your heart    Treatment:   · Cardioversion  is a procedure to return your heart rate and rhythm to normal  It can be done using medicines or electric shock  · A-fib ablation  is a procedure that uses energy to burn a small area of heart tissue  This creates scar tissue and prevents electrical signals that cause a-fib  You may need this procedure more than once  Ask for more information on a-fib ablation  · A pacemaker  may be inserted into your heart  A pacemaker is a device that controls your heartbeat  A pacemaker may be inserted during an ablation procedure or surgery  Ask your healthcare provider for more information on pacemakers  · Surgery  may be needed if other procedures do not work  During surgery your healthcare provider will make cuts in the upper part of your heart  The provider will stitch the cuts together to create scar tissue  The scar tissue will prevent electrical signals that cause a-fib  RISKS:   Treatment may fail to control your heart rate and rhythm  A-fib can increase your risk of heart failure and other heart conditions  A-fib may cause heart attack, heart failure, blood clots, or a stroke  These conditions can be life-threatening  CARE AGREEMENT:   You have the right to help plan your care  Learn about your health condition and how it may be treated  Discuss treatment options with your caregivers to decide what care you want to receive  You always have the right to refuse treatment  © 2017 [facility] Information is for End User's use only and may not be sold, redistributed or otherwise used for commercial purposes  All illustrations and images included in CareNotes® are the copyrighted property of A D A M , Inc  or Amrik Lazo  The above information is an  only  It is not intended as medical advice for individual conditions or treatments  Talk to your doctor, nurse or pharmacist before following any medical regimen to see if it is safe and effective for you      Implantable Cardioverter Defibrillator   AMBULATORY CARE:   What you need to know about an implantable cardioverter defibrillator insertion: An implantable cardioverter defibrillator (ICD) is a small device that monitors your heart rate and rhythm  It is placed inside your chest or abdomen  It may be used if you have an arrhythmia  An arrhythmia is an irregular heart rate or a heart rate that is too fast or too slow  Some arrhythmias may cause your heart to suddenly stop beating  An ICD can give a shock to your heart to make it start beating again  It can also make your heart beat faster or slower  How to prepare for ICD insertion:  Your healthcare provider will talk to you about how to prepare for surgery  He may tell you not to eat or drink anything after midnight on the day of your surgery  He will tell you what medicines to take or not take on the day of your surgery  You may be given an antibiotic through your IV to help prevent a bacterial infection  You will need someone to drive you home and stay with you after the procedure  You will not be able to lift anything heavy  Ask your healthcare provider when you can return to work after your procedure  What will happen during ICD insertion:   · You may be given general anesthesia to keep you asleep and free from pain during surgery  You may also be given a sedative and local anesthesia to numb the surgery area  With local anesthesia, you may still feel pressure or pushing during surgery, but you should not feel any pain  Your healthcare provider will make an incision in your chest or abdomen  A lead will be placed into a vein near your collarbone or neck and guided into your heart  You may have multiple leads placed into your heart  · The other end of the leads are attached to the generator and placed in a pocket under your skin  This pocket is usually in the shoulder area, but may also be in the abdominal area   The generator has a metal shell with a battery and a small computer  The computer will monitor your heart rate and rhythm  If the computer senses an irregular heart beat it will send electricity from the generator through the leads to your heart  These electrical shocks will make your heart beat normally  Your healthcare provider will close the incision with stitches or staples  He may also place a bandage or tape over your incision  How shocks from an ICD may feel: You may not notice the low energy shocks from your ICD or they may feel like a flutter in your chest  The high energy shocks are very short  They may feel like thumping or a painful kick in the chest  Your healthcare provider may give you medicine to decrease the number of high energy shocks  What will happen after ICD insertion:  You will be taken to a recovery room where you will rest until you are awake  You will be on a heart monitor  A heart monitor is an EKG that stays on continuously to record your heart's electrical activity  You may need a chest x-ray to make sure the ICD is in the right place  You may be able to leave when you are awake and your pain is controlled or, you may go to a hospital room and spend the night  Risks of ICD insertion:  You may bleed more than usual or get a blood clot after surgery  You may get an infection  The leads could poke a hole in your heart, lung, or vein  The leads may also cause arrhythmias when they are placed  Blood may collect in the pocket where the generator is placed and cause pain or an infection  The leads may disconnect or break, and you may need another surgery  You may also need another surgery to replace the entire ICD  Your ICD may not shock your heart when it needs it  It may also shock more than it should     Call 911 for any of the following:   · You have any of the following signs of a heart attack:      ¨ Squeezing, pressure, or pain in your chest that lasts longer than 5 minutes or returns    ¨ Discomfort or pain in your back, neck, jaw, stomach, or arm     ¨ Trouble breathing    ¨ Nausea or vomiting    ¨ Lightheadedness or a sudden cold sweat, especially with chest pain or trouble breathing    · You become weak, dizzy, or faint  · You feel your heart skip beats or beat very fast or slow, but you do not feel a shock from your ICD  · You feel lightheaded, short of breath, and have chest pain  · You cough up blood  · You have trouble breathing  Seek care immediately if:   · Your arm or leg feels warm, tender, and painful  It may look swollen and red  · Your stitches or staples come apart  · Blood soaks through your bandage  · You feel more than 3 shocks in a row from your ICD  Contact your healthcare provider if:   · You have a fever  · You feel 1 or more shocks from your ICD and feel fine afterwards  · Your feet or ankles swell  · The skin around your stitches or staples is red, swollen, or draining pus or fluid  · You have chills, a cough, and feel weak or achy  · You are sad or anxious and find it hard to do your usual activities  · You have questions or concerns about your condition or care  Medicines: You may need any of the following:  · Heart medicine  may be given to strengthen or control your heartbeat  · Blood thinners    help prevent blood clots  Examples of blood thinners include heparin and warfarin  Clots can cause strokes, heart attacks, and death  The following are general safety guidelines to follow while you are taking a blood thinner:    ¨ Watch for bleeding and bruising while you take blood thinners  Watch for bleeding from your gums or nose  Watch for blood in your urine and bowel movements  Use a soft washcloth on your skin, and a soft toothbrush to brush your teeth  This can keep your skin and gums from bleeding  If you shave, use an electric shaver  Do not play contact sports  ¨ Tell your dentist and other healthcare providers that you take anticoagulants   Wear a bracelet or necklace that says you take this medicine  ¨ Do not start or stop any medicines unless your healthcare provider tells you to  Many medicines cannot be used with blood thinners  ¨ Tell your healthcare provider right away if you forget to take the medicine, or if you take too much  ¨ Warfarin  is a blood thinner that you may need to take  The following are things you should be aware of if you take warfarin  § Foods and medicines can affect the amount of warfarin in your blood  Do not make major changes to your diet while you take warfarin  Warfarin works best when you eat about the same amount of vitamin K every day  Vitamin K is found in green leafy vegetables and certain other foods  Ask for more information about what to eat when you are taking warfarin  § You will need to see your healthcare provider for follow-up visits when you are on warfarin  You will need regular blood tests  These tests are used to decide how much medicine you need  · Prescription pain medicine  may be given  Ask your how to take this medicine safely  · Antibiotics  help treat an infection  · Take your medicine as directed  Contact your healthcare provider if you think your medicine is not helping or if you have side effects  Tell him or her if you are allergic to any medicine  Keep a list of the medicines, vitamins, and herbs you take  Include the amounts, and when and why you take them  Bring the list or the pill bottles to follow-up visits  Carry your medicine list with you in case of an emergency  Care for yourself at home:   · Apply ice  on your wound for 15 to 20 minutes every hour or as directed  Use an ice pack, or put crushed ice in a plastic bag  Cover it with a towel  Ice helps prevent tissue damage and decreases swelling and pain  · Do not lift anything heavier than 3 pounds  Lifting may put too much stress on your incision   Ask your healthcare provider when you can lift heavy objects  · Limit the use of your arm nearest to your ICD  Place your arm closest to the ICD in a sling  Wear as directed  This will help decrease swelling and pain  Prop your arm on pillows or blankets when you take off your sling to keep it elevated comfortably  Do not lift your arm closest to your ICD, over your heard for 5 days  Perform gentle range of motion exercises (ROM) exercises as directed to prevent arm and shoulder stiffness  Safety instructions when you have an ICD:  Talk to your healthcare provider about driving and playing sports after you have an ICD placed  The following are instructions to keep you safe with an ICD:  · Carry an ID card for your ICD at all times  This card has important information about your ICD  Healthcare providers need to know if you have an ICD so they can keep you safe during medical procedures and tests  · Wear medical alert jewelry  that says you have an ICD  Ask your healthcare provider where to get these items  · Stay away from magnets or machines with electric fields  Some MRI machines may be safe to use with your ICD  Ask your healthcare provider before you have an MRI  Avoid leaning into a car engine or doing welding  These things can interfere with how your ICD works  · Keep your cell phone and MP3 player away from your ICD  Do not place your cell phone or MP3 player in a breast pocket over your ICD site  Use your cell phone with the ear on the opposite side from your ICD  Wear arm bands with cell phones or MP3 players on the opposite arm from where your ICD is  · Tell airport security you have an ICD  You may need to be searched by hand when you go through a security gate  The security gate or handheld wand could harm your ICD  · Keep an ICD diary  Record when you get a shock and what you were doing before you got the shock  Keep track of how you felt before and after the shock, as well as how many shocks you received   Write down the day and time of each shock  Bring the diary with you when you see your healthcare provider or cardiologist   Learn how to take your pulse:  Check your pulse any time you feel light headed, dizzy, or short of breath  Use the second hand of a clock or a timer  Flip your hand with palms face up  Place your pointer finger and second finger onto the side of your wrist that is under your thumb  Apply gentle pressure  When you feel your pulse, count the number of beats in 15 seconds  Multiply this number by 4 to find the beats per minute  A normal pulse is 60 to 100 beats per minutes  Care for your wound as directed:  Wear loose-fitting clothing over the ICD site  Do not get your wound wet until your healthcare provider says it is okay  Carefully wash the wound with soap and water  Dry the area and put on new, clean bandages as directed  Change your bandages when they get wet or dirty  Do not put powders or lotions over your incision  Check your wound everyday for signs of infections such as swelling, redness, or pus  Follow up with your healthcare provider as directed: Your healthcare provider will check your ICD frequently  He will place a special magnet over your ICD to check it  Information will be sent to a computer about your heart rhythm, and how well your ICD is working  Your ICD battery may need to be replaced every 5 to 7 years  Write down your questions so you remember to ask them during your visits  © 2017 [address] [employer] Information is for End User's use only and may not be sold, redistributed or otherwise used for commercial purposes  All illustrations and images included in CareNotes® are the copyrighted property of A [employer] A M , Inc  or Amrik Lazo  The above information is an  only  It is not intended as medical advice for individual conditions or treatments   Talk to your doctor, nurse or pharmacist before following any medical regimen to see if it is safe and effective for you

## 2020-09-18 NOTE — ASSESSMENT & PLAN NOTE
 Blood pressure 162/91   Continue lisinopril, metoprolol succinate, and furosemide   Cardiology added verapamil   Monitor blood pressure

## 2020-09-18 NOTE — PLAN OF CARE
Problem: Potential for Falls  Goal: Patient will remain free of falls  Description: INTERVENTIONS:  - Assess patient frequently for physical needs  -  Identify cognitive and physical deficits and behaviors that affect risk of falls    -  [place] fall precautions as indicated by assessment   - Educate patient/family on patient safety including physical limitations  - Instruct patient to call for assistance with activity based on assessment  - Modify environment to reduce risk of injury  - Consider OT/PT consult to assist with strengthening/mobility  Outcome: Progressing     Problem: CARDIOVASCULAR - ADULT  Goal: Maintains optimal cardiac output and hemodynamic stability  Description: INTERVENTIONS:  - Monitor I/O, vital signs and rhythm  - Monitor for S/S and trends of decreased cardiac output  - Administer and titrate ordered vasoactive medications to optimize hemodynamic stability  - Assess quality of pulses, skin color and temperature  - Assess for signs of decreased coronary artery perfusion  - Instruct patient to report change in severity of symptoms  Outcome: Progressing  Goal: Absence of cardiac dysrhythmias or at baseline rhythm  Description: INTERVENTIONS:  - Continuous cardiac monitoring, vital signs, obtain 12 lead EKG if ordered  - Administer antiarrhythmic and heart rate control medications as ordered  - Monitor electrolytes and administer replacement therapy as ordered  Outcome: Progressing     Problem: RESPIRATORY - ADULT  Goal: Achieves optimal ventilation and oxygenation  Description: INTERVENTIONS:  - Assess for changes in respiratory status  - Assess for changes in mentation and behavior  - Position to facilitate oxygenation and minimize respiratory effort  - Oxygen administered by appropriate delivery if ordered  - Initiate smoking cessation education as indicated  - Encourage broncho-pulmonary hygiene including cough, deep breathe, Incentive Spirometry  - Assess the need for suctioning and aspirate as needed  - Assess and instruct to report SOB or any respiratory difficulty  - Respiratory Therapy support as indicated  Outcome: Progressing

## 2020-09-18 NOTE — UTILIZATION REVIEW
Continued Stay Review    Date:  9/18/20                         Current Patient Class: OBS Current Level of Care: [facility]    HPI:55 y o  female initially admitted on  9/17/20  To OBS status with Chest Pain  Assessment/Plan: Troponins were trended at [facility] and were stable  Cardiology was consulted  They added verapamil for hypertension  Cardiology also ordered an echocardiogram which showed  No change     Patient is medically stable for discharge and should follow-up with cardiology and PCP in the outpatient setting      Discharged    Pertinent Labs/Diagnostic Results:     Results from last 7 days   Lab Units 09/17/20  0507 09/16/20  2208   WBC Thousand/uL 5 20 6 34   HEMOGLOBIN g/dL 11 1* 12 2   HEMATOCRIT % 36 3 39 2   PLATELETS Thousands/uL 112* 135*   NEUTROS ABS Thousands/µL  --  3 95     Results from last 7 days   Lab Units 09/17/20  0507 09/16/20  2208   SODIUM mmol/L 138 138   POTASSIUM mmol/L 3 9 3 7   CHLORIDE mmol/L 104 104   CO2 mmol/L 24 28   ANION GAP mmol/L 10 6   BUN mg/dL 20 21*   CREATININE mg/dL 1 60* 1 67*   EGFR ml/min/1 73sq m 36 34   CALCIUM mg/dL 8 7 9 6   MAGNESIUM mg/dL 2 2  --      Results from last 7 days   Lab Units 09/16/20  2208   AST U/L 20   ALT U/L 15   ALK PHOS U/L 69 1   TOTAL PROTEIN g/dL 7 1   ALBUMIN g/dL 3 9   TOTAL BILIRUBIN mg/dL 0 28*     Results from last 7 days   Lab Units 09/17/20  0507 09/16/20  2208   GLUCOSE RANDOM mg/dL 109 121     Results from last 7 days   Lab Units 09/16/20  2208   HEMOGLOBIN A1C % 5 4   EAG mg/dl 108     Results from last 7 days   Lab Units 09/17/20  0859 09/17/20  0507 09/17/20  0224 09/16/20  2208   TROPONIN I ng/mL 0 07* 0 06* 0 09* 0 08*     Results from last 7 days   Lab Units 09/16/20  2208   D-DIMER QUANTITATIVE mg/L FEU 1 27*     Results from last 7 days   Lab Units 09/16/20  2208   TSH 3RD GENERATON uIU/mL 5 451     Results from last 7 days   Lab Units 09/16/20  2208   BNP pg/mL 728 2*     Results from last 7 days   Lab Units 09/16/20  2208   LIPASE u/L 104*     Vital Signs:   09/18/20 0745   97 7 °F (36 5 °C)   61   18   162/91      96 %   None (Room air)   Lying    09/17/20 2215   98 4 °F (36 9 °C)   61   18   154/88   115   97 %   None (Room air)   Lying      Medications:   Scheduled Medications:  furosemide, 20 mg, Oral, Daily  lisinopril, 20 mg, Oral, Daily  metoprolol succinate, 150 mg, Oral, BID  nicotine, 1 patch, Transdermal, Daily  pantoprazole, 40 mg, Oral, Early Morning  rivaroxaban, 20 mg, Oral, Daily With Breakfast    Continuous IV Infusions:     PRN Meds:  acetaminophen, 650 mg, Oral, Q6H PRN  hydrALAZINE, 5 mg, Intravenous, Q6H PRN  oxyCODONE, 5 mg, Oral, Q6H PRN  X 1  ( x 2  9/17)      Discharge Plan: TBD    Network Utilization Review Department  [email or web address]  ATTENTION: Please call with any questions or concerns to [phone] and carefully listen to the prompts so that you are directed to the right person  All voicemails are confidential   Buck Delude all requests for admission clinical reviews, approved or denied determinations and any other requests to dedicated fax number below belonging to the campus where the patient is receiving treatment   List of dedicated fax numbers for the Facilities:  [address] DENIALS (Administrative/Medical Necessity) [phone]   [address] (Maternity/NICU/Pediatrics) [phone]   Chelsy Marcum [phone]   Kenya Avendano [phone]   [place]  [phone]   [place]ers [place]ro [phone]   [address] [place] [address] [phone]   [facility]  [phone]   [address] [facility] [address]  [address] [facility] [address] [facility] [phone]

## 2020-09-18 NOTE — ASSESSMENT & PLAN NOTE
 Patient Presentation:  Patient presents with complaints of chest pain that began yesterday after she woke up from a nap  Patient states that when she woke up from her nap she was diaphoretic and experienced midsternal chest pain that radiated to her left arm and left jaw   Likely etiology:  ACS versus hypertensive urgency versus anxiety versus musculoskeletal    BRITTA: 3  o Troponins trended and stable   Initial EKG:  Sinus rhythm, right bundle-branch block, borderline prolonged QT interval, heart rate 66    o Monitor on telemetry   Pain Control:  Patient received nitropaste and 1 time dose of IV morphine in [facility] ER   Check fasting lipid panel and A1c  [facility] Cardiology consult     Echocardiogram 9/18

## 2020-09-18 NOTE — ASSESSMENT & PLAN NOTE
· Patient known to Dr Grace Loco of cardiology  · Status post ablation with cryo PVI in May 2020  · Rate/Rhythm Control:  Toprol XL  · Antiplatelet/Anticoagulation:  Xarelto

## 2020-09-18 NOTE — ASSESSMENT & PLAN NOTE
Wt Readings from Last 3 Encounters:   09/18/20 97 5 kg (215 lb)   09/16/20 95 3 kg (210 lb)   09/15/20 95 3 kg (210 lb)    Appears compensated at this time   HUBER from May 7978: "LV systolic function was normal   Ejection fraction was estimated to be 60%  Wall thickness was moderately increased  There was moderate concentric hypertrophy "   Monitor intake and output, daily weights   Low sodium diet and fluid restriction   Continue home medications

## 2020-09-18 NOTE — PROGRESS NOTES
Progress Note - Cardiology   Desire Naranjo 54 y o  female MRN: [id number]  Encounter: [id number]  09/18/20  8:52 AM        Assessment/Plan:  1  Chest pain  · Patient presented to the hospital with complaints of headache and sternal chest pain associated with diaphoresis  · Troponin 0 08 --> 0 09 --> 0 06 --> 0 07  · EKG:  Atrial paced, right bundle branch block, LVH  · Coronary catheterization 1/2019 did not show any significant coronary disease  · History of afib s/p ablation in May 2020  · ICD in place - requested interrogation yesterday  · Lipid panel:  Cholesterol 133, triglycerides 89, HDL 43, LDL 72  · Pending hemoglobin a 1  2  Chronic diastolic CHF  · Appears euvolemic at this time, reports baseline weight at 210 lb  Currently at baseline  · Echo 05/20/2020 - was EF of 60% with moderate concentric hypertrophy  · Continue home medication:  Lasix 20 mg daily  3  Hypertension  · Presented hypertensive, 195/95 in the ED  · Home med: lisinopril, metoprolol, lasix  · Symptoms could be potentially due to high blood pressure  · Patient still hypertensive this morning  4  History of Paroxysmal atrial fibrillation and VT  · Requested ICD interrogation yesterday  · Home medications: Toprol, Xarelto  CKD stage 3:  · Baseline creatinine 1 5-1 7  · Creatinine currently at baseline     Plan:  · Blood pressure still elevated at 162/91 this morning after we increased patient's home dose of lisinopril to 20 mg yesterday  Can add verapamil pending TTE results first  · TTE is still pending  · ICD interrogation shows short period of atrial fibrillation activity on 9/16/2020  · Follow up with EP           Subjective/Objective   Chief Complaint: No chief complaint on file  Subjective: Patient seen and examined at bedside  She denies chest pain, SOB or any discomfort  She is eager to go home         Patient Active Problem List   Diagnosis    Chest pain    Atrial fibrillation (HCC)    Hyperlipidemia    Benign hypertension with CKD (chronic kidney disease) stage III (HCC)    Gastroesophageal reflux disease without esophagitis    History of ventricular tachycardia (HCC) with ICD    ICD (implantable cardioverter-defibrillator) discharge    Nicotine dependence    NSTEMI [facility]non-ST elevated myocardial infarction) ([facility] )    Hypertrophic cardiomyopathy ([place] [address] )    Pre-operative cardiovascular examination    Palpitations    DANIELA (acute kidney injury) ([place] [address] )    Elevated troponin    Elevated lipase    Lightheadedness    Stage 3 chronic kidney disease (HCC)    Chronic diastolic CHF (congestive heart failure) (HCC)    Atrial fibrillation with RVR (HCC)    Cocaine abuse (HCC)    Hyperkalemia    Leg swelling     Past Medical History:   Diagnosis Date    Arrhythmia     Arthritis     Atrial fibrillation (HCC)     Breast lump     CKD (chronic kidney disease) stage 3, GFR 30-59 ml/min (HCC)     Disease of thyroid gland     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 ([place] [address] ) 5/25/2020    GERD (gastroesophageal reflux disease)     H/O transfusion 1987    Hepatitis C     resolved    Hepatitis C     Hyperlipidemia     Hypertension     Irregular heart beat     Pacemaker     Sleep apnea     no cpap    Tachycardia        Allergies   Allergen Reactions    Iodinated Diagnostic Agents Hives    Tape  [Medical Tape] Hives    Tramadol Hives and Rash       Current Facility-Administered Medications   Medication Dose Route Frequency Provider Last Rate Last Dose    acetaminophen (TYLENOL) tablet 650 mg  650 mg Oral Q6H PRN CHARLOTTE Castellanos   650 mg at 09/17/20 1758    furosemide (LASIX) tablet 20 mg  20 mg Oral Daily CHARLOTTE Scott   20 mg at 09/18/20 0814    hydrALAZINE (APRESOLINE) injection 5 mg  5 mg Intravenous Q6H PRN CHARLOTTE Castellanos        lisinopril (ZESTRIL) tablet 20 mg  20 mg Oral Daily Jimmie Jacob MD   20 mg at 09/18/20 0814    metoprolol succinate (TOPROL-XL) 24 hr tablet 150 mg  150 mg Oral BID Angus Krishnan MD   150 mg at 09/18/20 0814    nicotine (NICODERM CQ) 14 mg/24hr TD 24 hr patch 1 patch  1 patch Transdermal Daily Claudette Breaker, CRNP   1 patch at 09/18/20 0814    oxyCODONE (ROXICODONE) IR tablet 5 mg  5 mg Oral Q6H PRN Claudette Breaker, CRNP   5 mg at 09/18/20 0535    pantoprazole (PROTONIX) EC tablet 40 mg  40 mg Oral Early Morning Claudette Breaker, CRNP   40 mg at 09/18/20 4535    rivaroxaban (XARELTO) tablet 20 mg  20 mg Oral Daily With Breakfast Claudette Breaker, CRNP   20 mg at 09/18/20 0815       Vitals: /91 (BP Location: Left arm)   Pulse 61   Temp 97 7 °F (36 5 °C) (Oral)   Resp 18   Wt 97 5 kg (215 lb)   SpO2 96%   BMI 33 67 kg/m²     Intake/Output Summary (Last 24 hours) at 9/18/2020 0852  Last data filed at 9/18/2020 0459  Gross per 24 hour   Intake 420 ml   Output 300 ml   Net 120 ml     Wt Readings from Last 3 Encounters:   09/18/20 97 5 kg (215 lb)   09/16/20 95 3 kg (210 lb)   09/15/20 95 3 kg (210 lb)       Body mass index is 33 67 kg/m²  ,     Vitals:    09/17/20 0919 09/17/20 1500 09/17/20 2215 09/18/20 0745   BP: 158/88 142/80 154/88 162/91   Pulse:  60 61 61   Patient Position - Orthostatic VS:  Sitting Lying Lying       Physical Exam:     Physical Exam  Constitutional:       Appearance: Normal appearance  Cardiovascular:      Rate and Rhythm: Normal rate and regular rhythm  Pulmonary:      Effort: Pulmonary effort is normal       Breath sounds: Wheezing (mild expiratory wheezes) present  Abdominal:      Palpations: Abdomen is soft  Tenderness: There is no abdominal tenderness  Musculoskeletal:         General: No swelling  Skin:     General: Skin is warm and dry  Neurological:      General: No focal deficit present  Mental Status: She is alert and oriented to person, place, and time     Psychiatric:         Mood and Affect: Mood normal          Behavior: Behavior normal          Lab Results: BMP:  Results from last 7 days   Lab Units 09/17/20  0507 09/16/20  2208   POTASSIUM mmol/L 3 9 3 7   CHLORIDE mmol/L 104 104   CO2 mmol/L 24 28   BUN mg/dL 20 21*   CREATININE mg/dL 1 60* 1 67*   CALCIUM mg/dL 8 7 9 6       CBC:   Results from last 7 days   Lab Units 09/17/20  0507 09/16/20  2208   WBC Thousand/uL 5 20 6 34   HEMOGLOBIN g/dL 11 1* 12 2   HEMATOCRIT % 36 3 39 2   MCV fL 88 84   PLATELETS Thousands/uL 112* 135*   MCH pg 26 7* 26 1*   MCHC g/dL 30 6* 31 1*   RDW % 14 5 14 4   MPV fL 11 4 11 0       Results from last 7 days   Lab Units 09/17/20  0859 09/17/20  0507 09/17/20  0224   TROPONIN I ng/mL 0 07* 0 06* 0 09*                 Results from last 7 days   Lab Units 09/17/20  0507   MAGNESIUM mg/dL 2 2       INR:         Lipid Profile:   No results found for: CHOL  Lab Results   Component Value Date    HDL 43 09/17/2020    HDL 39 (L) 03/10/2020    HDL 50 01/07/2019     Lab Results   Component Value Date    LDLCALC 72 09/17/2020    LDLCALC 64 03/10/2020    [facility] 80 01/07/2019     Lab Results   Component Value Date    TRIG 89 09/17/2020    TRIG 52 03/10/2020    TRIG 49 01/07/2019         Hgb A1c:   Results from last 7 days   Lab Units 09/16/20  2208   HEMOGLOBIN A1C % 5 4         Telemetry: personally reviewed, atrial paced

## 2020-09-18 NOTE — DISCHARGE INSTR - AVS FIRST PAGE
Dear Sterling Meier,     It was our pleasure to care for you here at [facility], [facility]  It is our hope that we were always able to exceed the expected standards for your care during your stay  You were hospitalized due to Chest pain  You were cared for on the 2nd floor by Yuly Shaw MD under the service of Kelli Call MD with the [facility] who covers for your primary care physician (PCP), Brien Marcum MD, while you were hospitalized  If you have any questions or concerns related to this hospitalization, you may contact us at [phone]  For follow up as well as any medication refills, we recommend that you follow up with your primary care physician  A registered nurse will reach out to you by phone within a few days after your discharge to answer any additional questions that you may have after going home  However, at this time we provide for you here, the most important instructions / recommendations at discharge:     · Notable Medication Adjustments -   · Lisinopril increased to 20mg   · Testing Required after Discharge -   · none  · Important follow up information -   · F/U with PCP  · Cardiology     · Please review this entire after visit summary as additional general instructions including medication list, appointments, activity, diet, any pertinent wound care, and other additional recommendations from your care team that may be provided for you        Sincerely,     Yuly Shaw MD

## 2020-09-29 RX ORDER — FAMOTIDINE 20 MG/1
TABLET, FILM COATED ORAL
Qty: 2 TABLET | OUTPATIENT
Start: 2020-09-29

## 2020-10-18 ENCOUNTER — APPOINTMENT (EMERGENCY)
Dept: RADIOLOGY | Facility: HOSPITAL | Age: 55
End: 2020-10-18
Payer: COMMERCIAL

## 2020-10-18 ENCOUNTER — HOSPITAL ENCOUNTER (EMERGENCY)
Facility: HOSPITAL | Age: 55
Discharge: HOME | End: 2020-10-18
Attending: EMERGENCY MEDICINE | Admitting: EMERGENCY MEDICINE
Payer: COMMERCIAL

## 2020-10-18 VITALS
DIASTOLIC BLOOD PRESSURE: 109 MMHG | BODY MASS INDEX: 32.96 KG/M2 | HEIGHT: 67 IN | OXYGEN SATURATION: 98 % | RESPIRATION RATE: 20 BRPM | TEMPERATURE: 97.8 F | WEIGHT: 210 LBS | HEART RATE: 67 BPM | SYSTOLIC BLOOD PRESSURE: 169 MMHG

## 2020-10-18 DIAGNOSIS — J20.9 ACUTE BRONCHITIS, UNSPECIFIED ORGANISM: Primary | ICD-10-CM

## 2020-10-18 PROCEDURE — 71046 X-RAY EXAM CHEST 2 VIEWS: CPT

## 2020-10-18 PROCEDURE — 99284 EMERGENCY DEPT VISIT MOD MDM: CPT | Performed by: EMERGENCY MEDICINE

## 2020-10-18 PROCEDURE — U0003 INFECTIOUS AGENT DETECTION BY NUCLEIC ACID (DNA OR RNA); SEVERE ACUTE RESPIRATORY SYNDROME CORONAVIRUS 2 (SARS-COV-2) (CORONAVIRUS DISEASE [COVID-19]), AMPLIFIED PROBE TECHNIQUE, MAKING USE OF HIGH THROUGHPUT TECHNOLOGIES AS DESCRIBED BY CMS-2020-01-R: HCPCS | Performed by: EMERGENCY MEDICINE

## 2020-10-18 PROCEDURE — 99283 EMERGENCY DEPT VISIT LOW MDM: CPT

## 2020-10-18 RX ORDER — AZITHROMYCIN 250 MG/1
500 TABLET, FILM COATED ORAL ONCE
Status: COMPLETED | OUTPATIENT
Start: 2020-10-18 | End: 2020-10-18

## 2020-10-18 RX ORDER — AZITHROMYCIN 250 MG/1
TABLET, FILM COATED ORAL
Qty: 6 TABLET | Refills: 0 | Status: SHIPPED | OUTPATIENT
Start: 2020-10-18 | End: 2020-10-22

## 2020-10-18 RX ORDER — ALBUTEROL SULFATE 90 UG/1
1-2 AEROSOL, METERED RESPIRATORY (INHALATION) EVERY 6 HOURS PRN
Qty: 1 INHALER | Refills: 0 | Status: SHIPPED | OUTPATIENT
Start: 2020-10-18 | End: 2022-06-17

## 2020-10-18 RX ORDER — PREDNISONE 20 MG/1
20 TABLET ORAL 2 TIMES DAILY WITH MEALS
Qty: 10 TABLET | Refills: 0 | Status: SHIPPED | OUTPATIENT
Start: 2020-10-18 | End: 2020-10-23

## 2020-10-18 RX ORDER — ALBUTEROL SULFATE 90 UG/1
2 AEROSOL, METERED RESPIRATORY (INHALATION) ONCE
Status: COMPLETED | OUTPATIENT
Start: 2020-10-18 | End: 2020-10-18

## 2020-10-18 RX ADMIN — AZITHROMYCIN MONOHYDRATE 500 MG: 250 TABLET ORAL at 18:32

## 2020-10-18 RX ADMIN — PREDNISONE 50 MG: 20 TABLET ORAL at 18:14

## 2020-10-18 RX ADMIN — ALBUTEROL SULFATE 2 PUFF: 90 AEROSOL, METERED RESPIRATORY (INHALATION) at 18:21

## 2020-10-20 LAB — SARS-COV-2 RNA SPEC QL NAA+PROBE: NOT DETECTED

## 2020-10-28 LAB
ATRIAL RATE: 60 BPM
P AXIS: 68 DEGREES
PR INTERVAL: 186 MS
QRS AXIS: -42 DEGREES
QRSD INTERVAL: 164 MS
QT INTERVAL: 492 MS
QTC INTERVAL: 492 MS
T WAVE AXIS: 117 DEGREES
VENTRICULAR RATE: 60 BPM

## 2020-10-28 PROCEDURE — 93010 ELECTROCARDIOGRAM REPORT: CPT | Performed by: INTERNAL MEDICINE

## 2020-10-29 RX ORDER — FAMOTIDINE 20 MG/1
TABLET, FILM COATED ORAL
Qty: 2 TABLET | OUTPATIENT
Start: 2020-10-29

## 2020-11-01 ENCOUNTER — HOSPITAL ENCOUNTER (EMERGENCY)
Facility: HOSPITAL | Age: 55
Discharge: HOME | End: 2020-11-01
Attending: EMERGENCY MEDICINE | Admitting: EMERGENCY MEDICINE
Payer: COMMERCIAL

## 2020-11-01 VITALS
WEIGHT: 210 LBS | OXYGEN SATURATION: 99 % | BODY MASS INDEX: 33.75 KG/M2 | HEIGHT: 66 IN | SYSTOLIC BLOOD PRESSURE: 188 MMHG | HEART RATE: 60 BPM | RESPIRATION RATE: 18 BRPM | TEMPERATURE: 97.6 F | DIASTOLIC BLOOD PRESSURE: 89 MMHG

## 2020-11-01 DIAGNOSIS — J02.9 PHARYNGITIS: Primary | ICD-10-CM

## 2020-11-01 PROCEDURE — 99282 EMERGENCY DEPT VISIT SF MDM: CPT

## 2020-11-01 PROCEDURE — 99282 EMERGENCY DEPT VISIT SF MDM: CPT | Performed by: PHYSICIAN ASSISTANT

## 2020-11-01 RX ORDER — BUTALBITAL, ACETAMINOPHEN AND CAFFEINE 50; 325; 40 MG/1; MG/1; MG/1
1 TABLET ORAL ONCE
Status: COMPLETED | OUTPATIENT
Start: 2020-11-01 | End: 2020-11-01

## 2020-11-01 RX ORDER — AZITHROMYCIN 250 MG/1
TABLET, FILM COATED ORAL
Qty: 6 TABLET | Refills: 0 | Status: SHIPPED | OUTPATIENT
Start: 2020-11-01 | End: 2020-11-06

## 2020-11-01 RX ADMIN — BUTALBITAL, ACETAMINOPHEN, AND CAFFEINE 1 TABLET: 50; 325; 40 TABLET ORAL at 17:32

## 2020-11-02 DIAGNOSIS — K21.00 GASTROESOPHAGEAL REFLUX DISEASE WITH ESOPHAGITIS: ICD-10-CM

## 2020-11-02 RX ORDER — PANTOPRAZOLE SODIUM 40 MG/1
TABLET, DELAYED RELEASE ORAL
Qty: 30 TABLET | Refills: 1 | Status: SHIPPED | OUTPATIENT
Start: 2020-11-02 | End: 2021-05-20 | Stop reason: ALTCHOICE

## 2020-11-06 ENCOUNTER — APPOINTMENT (EMERGENCY)
Dept: RADIOLOGY | Facility: HOSPITAL | Age: 55
End: 2020-11-06
Payer: COMMERCIAL

## 2020-11-06 ENCOUNTER — HOSPITAL ENCOUNTER (EMERGENCY)
Facility: HOSPITAL | Age: 55
Discharge: HOME | End: 2020-11-06
Attending: EMERGENCY MEDICINE | Admitting: EMERGENCY MEDICINE
Payer: COMMERCIAL

## 2020-11-06 VITALS
TEMPERATURE: 97.9 F | SYSTOLIC BLOOD PRESSURE: 160 MMHG | OXYGEN SATURATION: 97 % | BODY MASS INDEX: 33.75 KG/M2 | HEIGHT: 66 IN | WEIGHT: 210 LBS | HEART RATE: 60 BPM | RESPIRATION RATE: 20 BRPM | DIASTOLIC BLOOD PRESSURE: 81 MMHG

## 2020-11-06 DIAGNOSIS — S70.02XA CONTUSION OF LEFT HIP, INITIAL ENCOUNTER: Primary | ICD-10-CM

## 2020-11-06 DIAGNOSIS — S50.11XA CONTUSION OF RIGHT FOREARM, INITIAL ENCOUNTER: ICD-10-CM

## 2020-11-06 PROCEDURE — 73090 X-RAY EXAM OF FOREARM: CPT

## 2020-11-06 PROCEDURE — 73502 X-RAY EXAM HIP UNI 2-3 VIEWS: CPT

## 2020-11-06 PROCEDURE — 99284 EMERGENCY DEPT VISIT MOD MDM: CPT | Performed by: EMERGENCY MEDICINE

## 2020-11-06 PROCEDURE — 99283 EMERGENCY DEPT VISIT LOW MDM: CPT

## 2020-11-06 RX ORDER — ACETAMINOPHEN 325 MG/1
975 TABLET ORAL ONCE
Status: COMPLETED | OUTPATIENT
Start: 2020-11-06 | End: 2020-11-06

## 2020-11-06 RX ADMIN — ACETAMINOPHEN 975 MG: 325 TABLET, FILM COATED ORAL at 19:13

## 2020-11-09 ENCOUNTER — HOSPITAL ENCOUNTER (EMERGENCY)
Facility: HOSPITAL | Age: 55
Discharge: HOME | End: 2020-11-09
Attending: EMERGENCY MEDICINE | Admitting: EMERGENCY MEDICINE
Payer: COMMERCIAL

## 2020-11-09 VITALS
DIASTOLIC BLOOD PRESSURE: 84 MMHG | RESPIRATION RATE: 18 BRPM | TEMPERATURE: 97.7 F | OXYGEN SATURATION: 98 % | HEART RATE: 64 BPM | WEIGHT: 205 LBS | BODY MASS INDEX: 32.95 KG/M2 | SYSTOLIC BLOOD PRESSURE: 202 MMHG | HEIGHT: 66 IN

## 2020-11-09 DIAGNOSIS — I12.9 BENIGN HYPERTENSION WITH CKD (CHRONIC KIDNEY DISEASE) STAGE III (HCC): ICD-10-CM

## 2020-11-09 DIAGNOSIS — L03.116 CELLULITIS OF LEFT THIGH: Primary | ICD-10-CM

## 2020-11-09 DIAGNOSIS — N18.30 BENIGN HYPERTENSION WITH CKD (CHRONIC KIDNEY DISEASE) STAGE III (HCC): ICD-10-CM

## 2020-11-09 PROCEDURE — 99284 EMERGENCY DEPT VISIT MOD MDM: CPT | Performed by: EMERGENCY MEDICINE

## 2020-11-09 PROCEDURE — 99282 EMERGENCY DEPT VISIT SF MDM: CPT

## 2020-11-09 RX ORDER — CEPHALEXIN 250 MG/1
500 CAPSULE ORAL ONCE
Status: COMPLETED | OUTPATIENT
Start: 2020-11-09 | End: 2020-11-09

## 2020-11-09 RX ORDER — HYDROCODONE BITARTRATE AND ACETAMINOPHEN 5; 325 MG/1; MG/1
1 TABLET ORAL ONCE
Status: COMPLETED | OUTPATIENT
Start: 2020-11-09 | End: 2020-11-09

## 2020-11-09 RX ORDER — CEPHALEXIN 500 MG/1
500 CAPSULE ORAL 2 TIMES DAILY
Qty: 14 CAPSULE | Refills: 0 | Status: SHIPPED | OUTPATIENT
Start: 2020-11-09 | End: 2020-11-16

## 2020-11-09 RX ADMIN — CEPHALEXIN 500 MG: 250 CAPSULE ORAL at 16:37

## 2020-11-09 RX ADMIN — HYDROCODONE BITARTRATE AND ACETAMINOPHEN 1 TABLET: 5; 325 TABLET ORAL at 16:36

## 2020-11-10 RX ORDER — LISINOPRIL 20 MG/1
TABLET ORAL
Qty: 30 TABLET | Refills: 0 | Status: SHIPPED | OUTPATIENT
Start: 2020-11-10 | End: 2021-01-03

## 2020-11-16 ENCOUNTER — TELEPHONE (OUTPATIENT)
Dept: CARDIOLOGY CLINIC | Facility: CLINIC | Age: 55
End: 2020-11-16

## 2020-11-16 ENCOUNTER — OFFICE VISIT (OUTPATIENT)
Dept: CARDIOLOGY CLINIC | Facility: CLINIC | Age: 55
End: 2020-11-16
Payer: COMMERCIAL

## 2020-11-16 ENCOUNTER — IN-CLINIC DEVICE VISIT (OUTPATIENT)
Dept: CARDIOLOGY CLINIC | Facility: CLINIC | Age: 55
End: 2020-11-16
Payer: COMMERCIAL

## 2020-11-16 VITALS
HEIGHT: 66 IN | OXYGEN SATURATION: 98 % | WEIGHT: 215 LBS | HEART RATE: 60 BPM | DIASTOLIC BLOOD PRESSURE: 86 MMHG | BODY MASS INDEX: 34.55 KG/M2 | SYSTOLIC BLOOD PRESSURE: 174 MMHG

## 2020-11-16 DIAGNOSIS — M79.89 LEG SWELLING: ICD-10-CM

## 2020-11-16 DIAGNOSIS — I50.32 CHRONIC DIASTOLIC CHF (CONGESTIVE HEART FAILURE) (HCC): ICD-10-CM

## 2020-11-16 DIAGNOSIS — I48.91 ATRIAL FIBRILLATION WITH RVR (HCC): ICD-10-CM

## 2020-11-16 DIAGNOSIS — I42.2 HYPERTROPHIC CARDIOMYOPATHY (HCC): Primary | ICD-10-CM

## 2020-11-16 DIAGNOSIS — Z95.810 PRESENCE OF IMPLANTABLE CARDIOVERTER-DEFIBRILLATOR (ICD): Primary | ICD-10-CM

## 2020-11-16 DIAGNOSIS — N18.30 BENIGN HYPERTENSION WITH CKD (CHRONIC KIDNEY DISEASE) STAGE III (HCC): ICD-10-CM

## 2020-11-16 DIAGNOSIS — Z45.02 ICD (IMPLANTABLE CARDIOVERTER-DEFIBRILLATOR) DISCHARGE: ICD-10-CM

## 2020-11-16 DIAGNOSIS — E78.5 HYPERLIPIDEMIA, UNSPECIFIED HYPERLIPIDEMIA TYPE: ICD-10-CM

## 2020-11-16 DIAGNOSIS — F14.10 COCAINE ABUSE (HCC): ICD-10-CM

## 2020-11-16 DIAGNOSIS — F17.210 CIGARETTE NICOTINE DEPENDENCE WITHOUT COMPLICATION: ICD-10-CM

## 2020-11-16 DIAGNOSIS — I12.9 BENIGN HYPERTENSION WITH CKD (CHRONIC KIDNEY DISEASE) STAGE III (HCC): ICD-10-CM

## 2020-11-16 DIAGNOSIS — I48.0 PAROXYSMAL ATRIAL FIBRILLATION (HCC): ICD-10-CM

## 2020-11-16 PROCEDURE — 93295 DEV INTERROG REMOTE 1/2/MLT: CPT | Performed by: INTERNAL MEDICINE

## 2020-11-16 PROCEDURE — 93296 REM INTERROG EVL PM/IDS: CPT | Performed by: INTERNAL MEDICINE

## 2020-11-16 PROCEDURE — 99214 OFFICE O/P EST MOD 30 MIN: CPT | Performed by: NURSE PRACTITIONER

## 2020-11-16 RX ORDER — FUROSEMIDE 20 MG/1
20 TABLET ORAL DAILY
Qty: 30 TABLET | Refills: 5 | Status: ON HOLD | OUTPATIENT
Start: 2020-11-16 | End: 2021-08-19 | Stop reason: SDUPTHER

## 2020-11-18 ENCOUNTER — TELEPHONE (OUTPATIENT)
Dept: CARDIOLOGY CLINIC | Facility: CLINIC | Age: 55
End: 2020-11-18

## 2020-11-25 ENCOUNTER — TELEPHONE (OUTPATIENT)
Dept: NEPHROLOGY | Facility: CLINIC | Age: 55
End: 2020-11-25

## 2020-11-30 RX ORDER — FAMOTIDINE 20 MG/1
TABLET, FILM COATED ORAL
Qty: 2 TABLET | OUTPATIENT
Start: 2020-11-30

## 2020-12-05 ENCOUNTER — HOSPITAL ENCOUNTER (EMERGENCY)
Facility: HOSPITAL | Age: 55
Discharge: HOME | End: 2020-12-05
Payer: COMMERCIAL

## 2020-12-05 ENCOUNTER — APPOINTMENT (EMERGENCY)
Dept: RADIOLOGY | Facility: HOSPITAL | Age: 55
End: 2020-12-05
Payer: COMMERCIAL

## 2020-12-05 VITALS
HEART RATE: 60 BPM | DIASTOLIC BLOOD PRESSURE: 101 MMHG | BODY MASS INDEX: 31.39 KG/M2 | RESPIRATION RATE: 20 BRPM | HEIGHT: 67 IN | TEMPERATURE: 98.2 F | OXYGEN SATURATION: 98 % | SYSTOLIC BLOOD PRESSURE: 169 MMHG | WEIGHT: 200 LBS

## 2020-12-05 DIAGNOSIS — S93.601A SPRAIN OF RIGHT FOOT, INITIAL ENCOUNTER: Primary | ICD-10-CM

## 2020-12-05 PROCEDURE — 99283 EMERGENCY DEPT VISIT LOW MDM: CPT

## 2020-12-05 PROCEDURE — 73630 X-RAY EXAM OF FOOT: CPT

## 2020-12-20 ENCOUNTER — HOSPITAL ENCOUNTER (EMERGENCY)
Facility: HOSPITAL | Age: 55
Discharge: HOME | End: 2020-12-20
Payer: COMMERCIAL

## 2020-12-20 ENCOUNTER — APPOINTMENT (EMERGENCY)
Dept: RADIOLOGY | Facility: HOSPITAL | Age: 55
End: 2020-12-20
Payer: COMMERCIAL

## 2020-12-20 ENCOUNTER — APPOINTMENT (EMERGENCY)
Dept: CT IMAGING | Facility: HOSPITAL | Age: 55
End: 2020-12-20
Payer: COMMERCIAL

## 2020-12-20 VITALS
SYSTOLIC BLOOD PRESSURE: 177 MMHG | DIASTOLIC BLOOD PRESSURE: 93 MMHG | RESPIRATION RATE: 18 BRPM | BODY MASS INDEX: 31.39 KG/M2 | WEIGHT: 200 LBS | HEART RATE: 63 BPM | HEIGHT: 67 IN | TEMPERATURE: 97.6 F | OXYGEN SATURATION: 100 %

## 2020-12-20 DIAGNOSIS — R10.12 LEFT UPPER QUADRANT ABDOMINAL PAIN: Primary | ICD-10-CM

## 2020-12-20 DIAGNOSIS — R11.2 NON-INTRACTABLE VOMITING WITH NAUSEA, UNSPECIFIED VOMITING TYPE: ICD-10-CM

## 2020-12-20 LAB
ALBUMIN SERPL BCP-MCNC: 4.1 G/DL (ref 3.4–4.8)
ALP SERPL-CCNC: 52.4 U/L (ref 35–140)
ALT SERPL W P-5'-P-CCNC: 20 U/L (ref 5–54)
ANION GAP SERPL CALCULATED.3IONS-SCNC: 8 MMOL/L (ref 4–13)
AST SERPL W P-5'-P-CCNC: 24 U/L (ref 15–41)
BACTERIA UR QL AUTO: NORMAL /HPF
BASOPHILS # BLD AUTO: 0.02 THOUSANDS/ΜL (ref 0–0.1)
BASOPHILS NFR BLD AUTO: 0 % (ref 0–1)
BILIRUB SERPL-MCNC: 0.55 MG/DL (ref 0.3–1.2)
BILIRUB UR QL STRIP: NEGATIVE
BUN SERPL-MCNC: 17 MG/DL (ref 6–20)
CALCIUM SERPL-MCNC: 9.1 MG/DL (ref 8.4–10.2)
CHLORIDE SERPL-SCNC: 104 MMOL/L (ref 96–108)
CLARITY UR: CLEAR
CO2 SERPL-SCNC: 28 MMOL/L (ref 22–33)
COLOR UR: YELLOW
CREAT SERPL-MCNC: 1.52 MG/DL (ref 0.4–1.1)
EOSINOPHIL # BLD AUTO: 0.11 THOUSAND/ΜL (ref 0–0.61)
EOSINOPHIL NFR BLD AUTO: 2 % (ref 0–6)
ERYTHROCYTE [DISTWIDTH] IN BLOOD BY AUTOMATED COUNT: 13.8 % (ref 11.6–15.1)
FLUAV RNA RESP QL NAA+PROBE: NEGATIVE
FLUBV RNA RESP QL NAA+PROBE: NEGATIVE
GFR SERPL CREATININE-BSD FRML MDRD: 38 ML/MIN/1.73SQ M
GLUCOSE SERPL-MCNC: 95 MG/DL (ref 65–140)
GLUCOSE UR STRIP-MCNC: NEGATIVE MG/DL
HCT VFR BLD AUTO: 41.7 % (ref 34.8–46.1)
HGB BLD-MCNC: 13.2 G/DL (ref 11.5–15.4)
HGB UR QL STRIP.AUTO: NEGATIVE
IMM GRANULOCYTES # BLD AUTO: 0.02 THOUSAND/UL (ref 0–0.2)
IMM GRANULOCYTES NFR BLD AUTO: 0 % (ref 0–2)
KETONES UR STRIP-MCNC: NEGATIVE MG/DL
LACTATE SERPL-SCNC: 0.7 MMOL/L (ref 0–2)
LEUKOCYTE ESTERASE UR QL STRIP: NEGATIVE
LIPASE SERPL-CCNC: 60 U/L (ref 13–60)
LYMPHOCYTES # BLD AUTO: 1.88 THOUSANDS/ΜL (ref 0.6–4.47)
LYMPHOCYTES NFR BLD AUTO: 26 % (ref 14–44)
MCH RBC QN AUTO: 26.9 PG (ref 26.8–34.3)
MCHC RBC AUTO-ENTMCNC: 31.7 G/DL (ref 31.4–37.4)
MCV RBC AUTO: 85 FL (ref 82–98)
MONOCYTES # BLD AUTO: 0.51 THOUSAND/ΜL (ref 0.17–1.22)
MONOCYTES NFR BLD AUTO: 7 % (ref 4–12)
NEUTROPHILS # BLD AUTO: 4.74 THOUSANDS/ΜL (ref 1.85–7.62)
NEUTS SEG NFR BLD AUTO: 65 % (ref 43–75)
NITRITE UR QL STRIP: NEGATIVE
NON-SQ EPI CELLS URNS QL MICRO: NORMAL /HPF
PH UR STRIP.AUTO: 6.5 [PH]
PLATELET # BLD AUTO: 119 THOUSANDS/UL (ref 149–390)
PMV BLD AUTO: 11.5 FL (ref 8.9–12.7)
POTASSIUM SERPL-SCNC: 4.3 MMOL/L (ref 3.5–5)
PROT SERPL-MCNC: 7.2 G/DL (ref 6.4–8.3)
PROT UR STRIP-MCNC: ABNORMAL MG/DL
RBC # BLD AUTO: 4.9 MILLION/UL (ref 3.81–5.12)
RBC #/AREA URNS AUTO: NORMAL /HPF
RSV RNA RESP QL NAA+PROBE: NEGATIVE
SARS-COV-2 RNA RESP QL NAA+PROBE: NEGATIVE
SODIUM SERPL-SCNC: 140 MMOL/L (ref 133–145)
SP GR UR STRIP.AUTO: 1.02 (ref 1–1.03)
TROPONIN I SERPL-MCNC: 0.06 NG/ML (ref 0–0.07)
UROBILINOGEN UR QL STRIP.AUTO: 0.2 E.U./DL
WBC # BLD AUTO: 7.28 THOUSAND/UL (ref 4.31–10.16)
WBC #/AREA URNS AUTO: NORMAL /HPF

## 2020-12-20 PROCEDURE — 83690 ASSAY OF LIPASE: CPT | Performed by: PHYSICIAN ASSISTANT

## 2020-12-20 PROCEDURE — 93005 ELECTROCARDIOGRAM TRACING: CPT

## 2020-12-20 PROCEDURE — 84484 ASSAY OF TROPONIN QUANT: CPT | Performed by: PHYSICIAN ASSISTANT

## 2020-12-20 PROCEDURE — 71045 X-RAY EXAM CHEST 1 VIEW: CPT

## 2020-12-20 PROCEDURE — 96361 HYDRATE IV INFUSION ADD-ON: CPT

## 2020-12-20 PROCEDURE — G1004 CDSM NDSC: HCPCS

## 2020-12-20 PROCEDURE — 0241U HB NFCT DS VIR RESP RNA 4 TRGT: CPT | Performed by: PHYSICIAN ASSISTANT

## 2020-12-20 PROCEDURE — 80053 COMPREHEN METABOLIC PANEL: CPT | Performed by: PHYSICIAN ASSISTANT

## 2020-12-20 PROCEDURE — 36415 COLL VENOUS BLD VENIPUNCTURE: CPT | Performed by: PHYSICIAN ASSISTANT

## 2020-12-20 PROCEDURE — 85025 COMPLETE CBC W/AUTO DIFF WBC: CPT | Performed by: PHYSICIAN ASSISTANT

## 2020-12-20 PROCEDURE — 99285 EMERGENCY DEPT VISIT HI MDM: CPT

## 2020-12-20 PROCEDURE — 96375 TX/PRO/DX INJ NEW DRUG ADDON: CPT

## 2020-12-20 PROCEDURE — 74176 CT ABD & PELVIS W/O CONTRAST: CPT

## 2020-12-20 PROCEDURE — 99285 EMERGENCY DEPT VISIT HI MDM: CPT | Performed by: PHYSICIAN ASSISTANT

## 2020-12-20 PROCEDURE — 81001 URINALYSIS AUTO W/SCOPE: CPT | Performed by: PHYSICIAN ASSISTANT

## 2020-12-20 PROCEDURE — 83605 ASSAY OF LACTIC ACID: CPT | Performed by: PHYSICIAN ASSISTANT

## 2020-12-20 PROCEDURE — 96374 THER/PROPH/DIAG INJ IV PUSH: CPT

## 2020-12-20 RX ORDER — ONDANSETRON 2 MG/ML
4 INJECTION INTRAMUSCULAR; INTRAVENOUS ONCE
Status: COMPLETED | OUTPATIENT
Start: 2020-12-20 | End: 2020-12-20

## 2020-12-20 RX ORDER — ONDANSETRON 4 MG/1
4 TABLET, FILM COATED ORAL EVERY 8 HOURS PRN
Qty: 10 TABLET | Refills: 0 | Status: SHIPPED | OUTPATIENT
Start: 2020-12-20 | End: 2021-03-11 | Stop reason: HOSPADM

## 2020-12-20 RX ORDER — MORPHINE SULFATE 4 MG/ML
4 INJECTION, SOLUTION INTRAMUSCULAR; INTRAVENOUS ONCE
Status: COMPLETED | OUTPATIENT
Start: 2020-12-20 | End: 2020-12-20

## 2020-12-20 RX ORDER — OMEPRAZOLE 20 MG/1
20 CAPSULE, DELAYED RELEASE ORAL DAILY
Qty: 14 CAPSULE | Refills: 0 | Status: SHIPPED | OUTPATIENT
Start: 2020-12-20 | End: 2021-08-19 | Stop reason: HOSPADM

## 2020-12-20 RX ADMIN — ONDANSETRON 4 MG: 2 INJECTION INTRAMUSCULAR; INTRAVENOUS at 11:47

## 2020-12-20 RX ADMIN — MORPHINE SULFATE 4 MG: 4 INJECTION INTRAVENOUS at 11:47

## 2020-12-20 RX ADMIN — SODIUM CHLORIDE 1000 ML: 0.9 INJECTION, SOLUTION INTRAVENOUS at 11:47

## 2020-12-21 LAB
ATRIAL RATE: 60 BPM
P AXIS: 30 DEGREES
PR INTERVAL: 200 MS
QRS AXIS: -43 DEGREES
QRSD INTERVAL: 164 MS
QT INTERVAL: 475 MS
QTC INTERVAL: 475 MS
T WAVE AXIS: 131 DEGREES
VENTRICULAR RATE: 60 BPM

## 2020-12-21 PROCEDURE — 93010 ELECTROCARDIOGRAM REPORT: CPT | Performed by: INTERNAL MEDICINE

## 2020-12-29 DIAGNOSIS — I12.9 BENIGN HYPERTENSION WITH CKD (CHRONIC KIDNEY DISEASE) STAGE III (HCC): ICD-10-CM

## 2020-12-29 DIAGNOSIS — N18.30 BENIGN HYPERTENSION WITH CKD (CHRONIC KIDNEY DISEASE) STAGE III (HCC): ICD-10-CM

## 2020-12-30 ENCOUNTER — TELEPHONE (OUTPATIENT)
Dept: NEPHROLOGY | Facility: CLINIC | Age: 55
End: 2020-12-30

## 2020-12-30 NOTE — TELEPHONE ENCOUNTER
I left a message for patient to call the [facility] back to reschedule her appointment that she had on 12/24/20

## 2020-12-31 RX ORDER — FAMOTIDINE 20 MG/1
TABLET, FILM COATED ORAL
Qty: 2 TABLET | OUTPATIENT
Start: 2020-12-31

## 2021-01-03 RX ORDER — LISINOPRIL 20 MG/1
TABLET ORAL
Qty: 30 TABLET | Refills: 0 | Status: SHIPPED | OUTPATIENT
Start: 2021-01-03 | End: 2021-02-05

## 2021-01-06 DIAGNOSIS — I48.91 ATRIAL FIBRILLATION, UNSPECIFIED TYPE (HCC): Chronic | ICD-10-CM

## 2021-01-06 RX ORDER — RIVAROXABAN 20 MG/1
TABLET, FILM COATED ORAL
Qty: 30 TABLET | Refills: 1 | Status: SHIPPED | OUTPATIENT
Start: 2021-01-06 | End: 2021-03-05

## 2021-01-11 ENCOUNTER — APPOINTMENT (EMERGENCY)
Dept: RADIOLOGY | Facility: HOSPITAL | Age: 56
End: 2021-01-11
Payer: COMMERCIAL

## 2021-01-11 ENCOUNTER — HOSPITAL ENCOUNTER (EMERGENCY)
Facility: HOSPITAL | Age: 56
Discharge: HOME | End: 2021-01-11
Attending: EMERGENCY MEDICINE
Payer: COMMERCIAL

## 2021-01-11 VITALS
SYSTOLIC BLOOD PRESSURE: 203 MMHG | BODY MASS INDEX: 32.96 KG/M2 | DIASTOLIC BLOOD PRESSURE: 111 MMHG | RESPIRATION RATE: 17 BRPM | OXYGEN SATURATION: 99 % | TEMPERATURE: 98.2 F | HEIGHT: 67 IN | WEIGHT: 210 LBS | HEART RATE: 66 BPM

## 2021-01-11 DIAGNOSIS — M79.645 THUMB PAIN, LEFT: Primary | ICD-10-CM

## 2021-01-11 DIAGNOSIS — M19.90 DJD (DEGENERATIVE JOINT DISEASE): ICD-10-CM

## 2021-01-11 PROCEDURE — 99284 EMERGENCY DEPT VISIT MOD MDM: CPT | Performed by: PHYSICIAN ASSISTANT

## 2021-01-11 PROCEDURE — 99283 EMERGENCY DEPT VISIT LOW MDM: CPT

## 2021-01-11 PROCEDURE — 73130 X-RAY EXAM OF HAND: CPT

## 2021-01-11 RX ORDER — OXYCODONE HYDROCHLORIDE AND ACETAMINOPHEN 5; 325 MG/1; MG/1
1 TABLET ORAL ONCE
Status: COMPLETED | OUTPATIENT
Start: 2021-01-11 | End: 2021-01-11

## 2021-01-11 RX ORDER — OXYCODONE HYDROCHLORIDE AND ACETAMINOPHEN 5; 325 MG/1; MG/1
1 TABLET ORAL EVERY 6 HOURS PRN
Qty: 10 TABLET | Refills: 0 | Status: SHIPPED | OUTPATIENT
Start: 2021-01-11 | End: 2021-01-14

## 2021-01-11 RX ADMIN — OXYCODONE HYDROCHLORIDE AND ACETAMINOPHEN 1 TABLET: 5; 325 TABLET ORAL at 13:21

## 2021-01-11 NOTE — DISCHARGE INSTRUCTIONS
Use Splint for next few days for comfort, taking off to bathe or sleep or until follow-up  Follow-up with your doctor or orthopedic doctor in the next few days if no improvement in condition
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
## 2021-01-11 NOTE — ED NOTES
Sandra PETERSON aware of HTN and that patient agreeable to pain medication        Kristi Mcadams, JAE  01/11/21 4049

## 2021-01-13 ENCOUNTER — OFFICE VISIT (OUTPATIENT)
Dept: OBGYN CLINIC | Facility: CLINIC | Age: 56
End: 2021-01-13
Payer: COMMERCIAL

## 2021-01-13 VITALS
HEIGHT: 67 IN | SYSTOLIC BLOOD PRESSURE: 185 MMHG | WEIGHT: 210 LBS | HEART RATE: 60 BPM | DIASTOLIC BLOOD PRESSURE: 83 MMHG | BODY MASS INDEX: 32.96 KG/M2

## 2021-01-13 DIAGNOSIS — M18.12 ARTHRITIS OF CARPOMETACARPAL (CMC) JOINT OF LEFT THUMB: Primary | ICD-10-CM

## 2021-01-13 PROCEDURE — 99204 OFFICE O/P NEW MOD 45 MIN: CPT | Performed by: SURGERY

## 2021-01-13 PROCEDURE — 20600 DRAIN/INJ JOINT/BURSA W/O US: CPT | Performed by: SURGERY

## 2021-01-13 RX ORDER — LIDOCAINE HYDROCHLORIDE 10 MG/ML
1 INJECTION, SOLUTION INFILTRATION; PERINEURAL
Status: COMPLETED | OUTPATIENT
Start: 2021-01-13 | End: 2021-01-13

## 2021-01-13 RX ORDER — BUPIVACAINE HYDROCHLORIDE 2.5 MG/ML
0.5 INJECTION, SOLUTION INFILTRATION; PERINEURAL
Status: COMPLETED | OUTPATIENT
Start: 2021-01-13 | End: 2021-01-13

## 2021-01-13 RX ORDER — TRIAMCINOLONE ACETONIDE 40 MG/ML
20 INJECTION, SUSPENSION INTRA-ARTICULAR; INTRAMUSCULAR
Status: COMPLETED | OUTPATIENT
Start: 2021-01-13 | End: 2021-01-13

## 2021-01-13 RX ADMIN — BUPIVACAINE HYDROCHLORIDE 0.5 ML: 2.5 INJECTION, SOLUTION INFILTRATION; PERINEURAL at 14:16

## 2021-01-13 RX ADMIN — TRIAMCINOLONE ACETONIDE 20 MG: 40 INJECTION, SUSPENSION INTRA-ARTICULAR; INTRAMUSCULAR at 14:16

## 2021-01-13 RX ADMIN — LIDOCAINE HYDROCHLORIDE 1 ML: 10 INJECTION, SOLUTION INFILTRATION; PERINEURAL at 14:16
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
## 2021-01-18 ENCOUNTER — TELEPHONE (OUTPATIENT)
Dept: OBGYN CLINIC | Facility: HOSPITAL | Age: 56
End: 2021-01-18

## 2021-01-18 NOTE — TELEPHONE ENCOUNTER
Pt  Called in stating last night she started with a sharp pain to her left wrist, pt  Stated when she tries to pick anything up she experiences 10 out of 10 sharp pain to her wrist causing her to drop things  Pt  Has concerns that it is the same hand she received a cortisone injection on the 13th   I advised pt  To include extra strength tylenol 1000 mg Q8h for pain and also continue the Voltaren gel as prescribed  Please advise thanks

## 2021-01-21 ENCOUNTER — HOSPITAL ENCOUNTER (EMERGENCY)
Facility: HOSPITAL | Age: 56
Discharge: HOME | End: 2021-01-21
Attending: EMERGENCY MEDICINE | Admitting: EMERGENCY MEDICINE
Payer: COMMERCIAL

## 2021-01-21 ENCOUNTER — APPOINTMENT (EMERGENCY)
Dept: CT IMAGING | Facility: HOSPITAL | Age: 56
End: 2021-01-21
Payer: COMMERCIAL

## 2021-01-21 VITALS
TEMPERATURE: 97.6 F | RESPIRATION RATE: 18 BRPM | DIASTOLIC BLOOD PRESSURE: 83 MMHG | OXYGEN SATURATION: 99 % | SYSTOLIC BLOOD PRESSURE: 143 MMHG | HEART RATE: 60 BPM

## 2021-01-21 DIAGNOSIS — N20.0 RIGHT NEPHROLITHIASIS: ICD-10-CM

## 2021-01-21 DIAGNOSIS — R35.0 URINARY FREQUENCY: ICD-10-CM

## 2021-01-21 DIAGNOSIS — R10.9 LEFT FLANK PAIN: Primary | ICD-10-CM

## 2021-01-21 LAB
ALBUMIN SERPL BCP-MCNC: 3.9 G/DL (ref 3.5–5)
ALP SERPL-CCNC: 64 U/L (ref 46–116)
ALT SERPL W P-5'-P-CCNC: 22 U/L (ref 12–78)
ANION GAP SERPL CALCULATED.3IONS-SCNC: 8 MMOL/L (ref 4–13)
AST SERPL W P-5'-P-CCNC: 24 U/L (ref 5–45)
BACTERIA UR QL AUTO: NORMAL /HPF
BASOPHILS # BLD AUTO: 0.03 THOUSANDS/ΜL (ref 0–0.1)
BASOPHILS NFR BLD AUTO: 1 % (ref 0–1)
BILIRUB SERPL-MCNC: 0.46 MG/DL (ref 0.2–1)
BILIRUB UR QL STRIP: NEGATIVE
BUN SERPL-MCNC: 22 MG/DL (ref 5–25)
CALCIUM SERPL-MCNC: 8.7 MG/DL (ref 8.3–10.1)
CHLORIDE SERPL-SCNC: 104 MMOL/L (ref 100–108)
CLARITY UR: ABNORMAL
CO2 SERPL-SCNC: 28 MMOL/L (ref 21–32)
COLOR UR: YELLOW
CREAT SERPL-MCNC: 1.77 MG/DL (ref 0.6–1.3)
EOSINOPHIL # BLD AUTO: 0.07 THOUSAND/ΜL (ref 0–0.61)
EOSINOPHIL NFR BLD AUTO: 1 % (ref 0–6)
ERYTHROCYTE [DISTWIDTH] IN BLOOD BY AUTOMATED COUNT: 13.3 % (ref 11.6–15.1)
GFR SERPL CREATININE-BSD FRML MDRD: 32 ML/MIN/1.73SQ M
GLUCOSE SERPL-MCNC: 124 MG/DL (ref 65–140)
GLUCOSE UR STRIP-MCNC: NEGATIVE MG/DL
HCT VFR BLD AUTO: 41.5 % (ref 34.8–46.1)
HGB BLD-MCNC: 13.1 G/DL (ref 11.5–15.4)
HGB UR QL STRIP.AUTO: NEGATIVE
IMM GRANULOCYTES # BLD AUTO: 0.02 THOUSAND/UL (ref 0–0.2)
IMM GRANULOCYTES NFR BLD AUTO: 0 % (ref 0–2)
KETONES UR STRIP-MCNC: NEGATIVE MG/DL
LEUKOCYTE ESTERASE UR QL STRIP: NEGATIVE
LIPASE SERPL-CCNC: 252 U/L (ref 73–393)
LYMPHOCYTES # BLD AUTO: 1.73 THOUSANDS/ΜL (ref 0.6–4.47)
LYMPHOCYTES NFR BLD AUTO: 27 % (ref 14–44)
MCH RBC QN AUTO: 27.3 PG (ref 26.8–34.3)
MCHC RBC AUTO-ENTMCNC: 31.6 G/DL (ref 31.4–37.4)
MCV RBC AUTO: 87 FL (ref 82–98)
MONOCYTES # BLD AUTO: 0.29 THOUSAND/ΜL (ref 0.17–1.22)
MONOCYTES NFR BLD AUTO: 5 % (ref 4–12)
NEUTROPHILS # BLD AUTO: 4.31 THOUSANDS/ΜL (ref 1.85–7.62)
NEUTS SEG NFR BLD AUTO: 66 % (ref 43–75)
NITRITE UR QL STRIP: NEGATIVE
NON-SQ EPI CELLS URNS QL MICRO: NORMAL /HPF
NRBC BLD AUTO-RTO: 0 /100 WBCS
PH UR STRIP.AUTO: 5.5 [PH] (ref 4.5–8)
PLATELET # BLD AUTO: 116 THOUSANDS/UL (ref 149–390)
PMV BLD AUTO: 11.2 FL (ref 8.9–12.7)
POTASSIUM SERPL-SCNC: 3.9 MMOL/L (ref 3.5–5.3)
PROT SERPL-MCNC: 7.4 G/DL (ref 6.4–8.2)
PROT UR STRIP-MCNC: ABNORMAL MG/DL
RBC # BLD AUTO: 4.8 MILLION/UL (ref 3.81–5.12)
RBC #/AREA URNS AUTO: NORMAL /HPF
SODIUM SERPL-SCNC: 140 MMOL/L (ref 136–145)
SP GR UR STRIP.AUTO: 1.02 (ref 1–1.03)
TROPONIN I SERPL-MCNC: 0.07 NG/ML
UROBILINOGEN UR QL STRIP.AUTO: 0.2 E.U./DL
WBC # BLD AUTO: 6.45 THOUSAND/UL (ref 4.31–10.16)
WBC #/AREA URNS AUTO: NORMAL /HPF

## 2021-01-21 PROCEDURE — G1004 CDSM NDSC: HCPCS

## 2021-01-21 PROCEDURE — 96361 HYDRATE IV INFUSION ADD-ON: CPT

## 2021-01-21 PROCEDURE — 81001 URINALYSIS AUTO W/SCOPE: CPT

## 2021-01-21 PROCEDURE — 83690 ASSAY OF LIPASE: CPT | Performed by: EMERGENCY MEDICINE

## 2021-01-21 PROCEDURE — 74176 CT ABD & PELVIS W/O CONTRAST: CPT

## 2021-01-21 PROCEDURE — 96374 THER/PROPH/DIAG INJ IV PUSH: CPT

## 2021-01-21 PROCEDURE — 84484 ASSAY OF TROPONIN QUANT: CPT | Performed by: PHYSICIAN ASSISTANT

## 2021-01-21 PROCEDURE — 99284 EMERGENCY DEPT VISIT MOD MDM: CPT

## 2021-01-21 PROCEDURE — 36415 COLL VENOUS BLD VENIPUNCTURE: CPT

## 2021-01-21 PROCEDURE — 85025 COMPLETE CBC W/AUTO DIFF WBC: CPT | Performed by: EMERGENCY MEDICINE

## 2021-01-21 PROCEDURE — 80053 COMPREHEN METABOLIC PANEL: CPT | Performed by: EMERGENCY MEDICINE

## 2021-01-21 PROCEDURE — 99285 EMERGENCY DEPT VISIT HI MDM: CPT | Performed by: PHYSICIAN ASSISTANT

## 2021-01-21 PROCEDURE — 93005 ELECTROCARDIOGRAM TRACING: CPT

## 2021-01-21 RX ORDER — LIDOCAINE 50 MG/G
1 PATCH TOPICAL DAILY
Qty: 30 PATCH | Refills: 0 | Status: SHIPPED | OUTPATIENT
Start: 2021-01-21 | End: 2021-03-06

## 2021-01-21 RX ORDER — OXYCODONE HYDROCHLORIDE 5 MG/1
5 TABLET ORAL EVERY 4 HOURS PRN
Qty: 10 TABLET | Refills: 0 | Status: SHIPPED | OUTPATIENT
Start: 2021-01-21 | End: 2021-01-24

## 2021-01-21 RX ORDER — MORPHINE SULFATE 10 MG/ML
6 INJECTION, SOLUTION INTRAMUSCULAR; INTRAVENOUS ONCE
Status: COMPLETED | OUTPATIENT
Start: 2021-01-21 | End: 2021-01-21

## 2021-01-21 RX ADMIN — SODIUM CHLORIDE 1000 ML: 0.9 INJECTION, SOLUTION INTRAVENOUS at 14:07

## 2021-01-21 RX ADMIN — MORPHINE SULFATE 6 MG: 10 INJECTION INTRAVENOUS at 14:11

## 2021-01-21 NOTE — DISCHARGE INSTRUCTIONS
Flank Pain   WHAT YOU NEED TO KNOW:   Flank pain is felt in the area below your ribcage and above your hip bones, often in the lower back  Your pain may be dull or so severe that you cannot get comfortable  The pain may stay in one area or radiate to another area  It may worsen and lighten in waves  Flank pain is often a sign of problems with your urinary tract, such as a kidney stone or infection  DISCHARGE INSTRUCTIONS:   Return to the emergency department if:   · You have a fever  · Your heart is fluttering or jumping  · You see blood in your urine  · Your pain radiates into your lower abdomen and genital area  · You have intense pain in your low back next to your spine  · You are much more tired than usual and have no desire to eat  · You have a headache and your muscles jerk  Contact your healthcare provider if:   · You have an upset stomach and are vomiting  · You have to urinate more often, and with urgency  · Your pain worsens or does not improve, and you cannot get comfortable  · You pass a stone when you urinate  · You have questions or concerns about your condition or care  Medicines: The following medicines may be ordered for you:  · Pain medicine  may help decrease or relieve your pain  Do not wait until the pain is severe before you take your medicine  · Antibiotics  may help treat a urinary tract infection caused by bacteria  · Take your medicine as directed  Contact your healthcare provider if you think your medicine is not helping or if you have side effects  Tell him of her if you are allergic to any medicine  Keep a list of the medicines, vitamins, and herbs you take  Include the amounts, and when and why you take them  Bring the list or the pill bottles to follow-up visits  Carry your medicine list with you in case of an emergency      Follow up with your healthcare provider in 1 to 2 days or as directed:  Write down your questions so you remember to ask them during your visits  © Copyright [address] Information is for End User's use only and may not be sold, redistributed or otherwise used for commercial purposes  All illustrations and images included in CareNotes® are the copyrighted property of [employer]  or [employer]  The above information is an  only  It is not intended as medical advice for individual conditions or treatments  Talk to your doctor, nurse or pharmacist before following any medical regimen to see if it is safe and effective for you
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
## 2021-01-22 LAB
ATRIAL RATE: 60 BPM
P AXIS: 87 DEGREES
PR INTERVAL: 160 MS
QRS AXIS: -52 DEGREES
QRSD INTERVAL: 172 MS
QT INTERVAL: 484 MS
QTC INTERVAL: 484 MS
T WAVE AXIS: 123 DEGREES
VENTRICULAR RATE: 60 BPM

## 2021-01-22 PROCEDURE — 93010 ELECTROCARDIOGRAM REPORT: CPT | Performed by: INTERNAL MEDICINE

## 2021-01-27 DIAGNOSIS — K21.9 GERD (GASTROESOPHAGEAL REFLUX DISEASE): Primary | ICD-10-CM

## 2021-01-27 RX ORDER — FAMOTIDINE 20 MG/1
TABLET, FILM COATED ORAL
Qty: 30 TABLET | Refills: 3 | OUTPATIENT
Start: 2021-01-27

## 2021-02-04 ENCOUNTER — APPOINTMENT (EMERGENCY)
Dept: CT IMAGING | Facility: HOSPITAL | Age: 56
End: 2021-02-04
Payer: COMMERCIAL

## 2021-02-04 ENCOUNTER — HOSPITAL ENCOUNTER (EMERGENCY)
Facility: HOSPITAL | Age: 56
Discharge: HOME | End: 2021-02-04
Attending: INTERNAL MEDICINE | Admitting: INTERNAL MEDICINE
Payer: COMMERCIAL

## 2021-02-04 ENCOUNTER — APPOINTMENT (EMERGENCY)
Dept: RADIOLOGY | Facility: HOSPITAL | Age: 56
End: 2021-02-04
Payer: COMMERCIAL

## 2021-02-04 VITALS
SYSTOLIC BLOOD PRESSURE: 174 MMHG | HEIGHT: 67 IN | RESPIRATION RATE: 16 BRPM | WEIGHT: 209 LBS | OXYGEN SATURATION: 100 % | HEART RATE: 69 BPM | TEMPERATURE: 98.6 F | BODY MASS INDEX: 32.8 KG/M2 | DIASTOLIC BLOOD PRESSURE: 74 MMHG

## 2021-02-04 DIAGNOSIS — N18.30 BENIGN HYPERTENSION WITH CKD (CHRONIC KIDNEY DISEASE) STAGE III (HCC): ICD-10-CM

## 2021-02-04 DIAGNOSIS — R00.2 PALPITATIONS: Primary | ICD-10-CM

## 2021-02-04 DIAGNOSIS — I12.9 BENIGN HYPERTENSION WITH CKD (CHRONIC KIDNEY DISEASE) STAGE III (HCC): ICD-10-CM

## 2021-02-04 DIAGNOSIS — R42 DIZZINESS: ICD-10-CM

## 2021-02-04 DIAGNOSIS — R77.8 ELEVATED TROPONIN: ICD-10-CM

## 2021-02-04 LAB
ALBUMIN SERPL BCP-MCNC: 4.4 G/DL (ref 3.4–4.8)
ALP SERPL-CCNC: 58.3 U/L (ref 35–140)
ALT SERPL W P-5'-P-CCNC: 19 U/L (ref 5–54)
ANION GAP SERPL CALCULATED.3IONS-SCNC: 8 MMOL/L (ref 4–13)
AST SERPL W P-5'-P-CCNC: 25 U/L (ref 15–41)
ATRIAL RATE: 62 BPM
BASOPHILS # BLD AUTO: 0.02 THOUSANDS/ΜL (ref 0–0.1)
BASOPHILS NFR BLD AUTO: 0 % (ref 0–1)
BILIRUB SERPL-MCNC: 0.61 MG/DL (ref 0.3–1.2)
BUN SERPL-MCNC: 17 MG/DL (ref 6–20)
CALCIUM SERPL-MCNC: 9.9 MG/DL (ref 8.4–10.2)
CHLORIDE SERPL-SCNC: 104 MMOL/L (ref 96–108)
CO2 SERPL-SCNC: 30 MMOL/L (ref 22–33)
CREAT SERPL-MCNC: 1.52 MG/DL (ref 0.4–1.1)
EOSINOPHIL # BLD AUTO: 0.04 THOUSAND/ΜL (ref 0–0.61)
EOSINOPHIL NFR BLD AUTO: 1 % (ref 0–6)
ERYTHROCYTE [DISTWIDTH] IN BLOOD BY AUTOMATED COUNT: 13.7 % (ref 11.6–15.1)
GFR SERPL CREATININE-BSD FRML MDRD: 38 ML/MIN/1.73SQ M
GLUCOSE SERPL-MCNC: 89 MG/DL (ref 65–140)
HCT VFR BLD AUTO: 43.1 % (ref 34.8–46.1)
HGB BLD-MCNC: 13.8 G/DL (ref 11.5–15.4)
IMM GRANULOCYTES # BLD AUTO: 0.01 THOUSAND/UL (ref 0–0.2)
IMM GRANULOCYTES NFR BLD AUTO: 0 % (ref 0–2)
LYMPHOCYTES # BLD AUTO: 1.55 THOUSANDS/ΜL (ref 0.6–4.47)
LYMPHOCYTES NFR BLD AUTO: 22 % (ref 14–44)
MCH RBC QN AUTO: 27 PG (ref 26.8–34.3)
MCHC RBC AUTO-ENTMCNC: 32 G/DL (ref 31.4–37.4)
MCV RBC AUTO: 84 FL (ref 82–98)
MONOCYTES # BLD AUTO: 0.53 THOUSAND/ΜL (ref 0.17–1.22)
MONOCYTES NFR BLD AUTO: 7 % (ref 4–12)
NEUTROPHILS # BLD AUTO: 5.02 THOUSANDS/ΜL (ref 1.85–7.62)
NEUTS SEG NFR BLD AUTO: 70 % (ref 43–75)
P AXIS: 68 DEGREES
PLATELET # BLD AUTO: 141 THOUSANDS/UL (ref 149–390)
PMV BLD AUTO: 11.7 FL (ref 8.9–12.7)
POTASSIUM SERPL-SCNC: 4.6 MMOL/L (ref 3.5–5)
PR INTERVAL: 147 MS
PROT SERPL-MCNC: 7.5 G/DL (ref 6.4–8.3)
QRS AXIS: -49 DEGREES
QRSD INTERVAL: 164 MS
QT INTERVAL: 487 MS
QTC INTERVAL: 495 MS
RBC # BLD AUTO: 5.12 MILLION/UL (ref 3.81–5.12)
SODIUM SERPL-SCNC: 142 MMOL/L (ref 133–145)
T WAVE AXIS: 109 DEGREES
TROPONIN I SERPL-MCNC: 0.1 NG/ML (ref 0–0.07)
VENTRICULAR RATE: 62 BPM
WBC # BLD AUTO: 7.17 THOUSAND/UL (ref 4.31–10.16)

## 2021-02-04 PROCEDURE — 71045 X-RAY EXAM CHEST 1 VIEW: CPT

## 2021-02-04 PROCEDURE — 80053 COMPREHEN METABOLIC PANEL: CPT | Performed by: INTERNAL MEDICINE

## 2021-02-04 PROCEDURE — 85025 COMPLETE CBC W/AUTO DIFF WBC: CPT | Performed by: INTERNAL MEDICINE

## 2021-02-04 PROCEDURE — 93005 ELECTROCARDIOGRAM TRACING: CPT

## 2021-02-04 PROCEDURE — 99285 EMERGENCY DEPT VISIT HI MDM: CPT

## 2021-02-04 PROCEDURE — G1004 CDSM NDSC: HCPCS

## 2021-02-04 PROCEDURE — 70450 CT HEAD/BRAIN W/O DYE: CPT

## 2021-02-04 PROCEDURE — 99285 EMERGENCY DEPT VISIT HI MDM: CPT | Performed by: INTERNAL MEDICINE

## 2021-02-04 PROCEDURE — 84484 ASSAY OF TROPONIN QUANT: CPT | Performed by: INTERNAL MEDICINE

## 2021-02-04 PROCEDURE — 36415 COLL VENOUS BLD VENIPUNCTURE: CPT | Performed by: INTERNAL MEDICINE

## 2021-02-04 PROCEDURE — 93010 ELECTROCARDIOGRAM REPORT: CPT | Performed by: INTERNAL MEDICINE

## 2021-02-04 RX ORDER — ACETAMINOPHEN 325 MG/1
650 TABLET ORAL ONCE
Status: COMPLETED | OUTPATIENT
Start: 2021-02-04 | End: 2021-02-04

## 2021-02-04 RX ORDER — CLONIDINE HYDROCHLORIDE 0.1 MG/1
0.2 TABLET ORAL ONCE
Status: COMPLETED | OUTPATIENT
Start: 2021-02-04 | End: 2021-02-04

## 2021-02-04 RX ORDER — LABETALOL 20 MG/4 ML (5 MG/ML) INTRAVENOUS SYRINGE
10 ONCE
Status: DISCONTINUED | OUTPATIENT
Start: 2021-02-04 | End: 2021-02-04

## 2021-02-04 RX ADMIN — CLONIDINE HYDROCHLORIDE 0.2 MG: 0.1 TABLET ORAL at 14:51

## 2021-02-04 RX ADMIN — ACETAMINOPHEN 650 MG: 325 TABLET ORAL at 14:51
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
## 2021-02-05 RX ORDER — LISINOPRIL 20 MG/1
TABLET ORAL
Qty: 30 TABLET | Refills: 0 | Status: ON HOLD | OUTPATIENT
Start: 2021-02-05 | End: 2021-03-11 | Stop reason: SDUPTHER

## 2021-02-07 ENCOUNTER — APPOINTMENT (EMERGENCY)
Dept: RADIOLOGY | Facility: HOSPITAL | Age: 56
End: 2021-02-07
Payer: COMMERCIAL

## 2021-02-07 ENCOUNTER — HOSPITAL ENCOUNTER (OUTPATIENT)
Facility: HOSPITAL | Age: 56
Setting detail: OBSERVATION
Discharge: HOME | End: 2021-02-07
Attending: EMERGENCY MEDICINE | Admitting: INTERNAL MEDICINE
Payer: COMMERCIAL

## 2021-02-07 VITALS
BODY MASS INDEX: 33.84 KG/M2 | TEMPERATURE: 98.2 F | HEIGHT: 67 IN | HEART RATE: 60 BPM | DIASTOLIC BLOOD PRESSURE: 74 MMHG | WEIGHT: 215.61 LBS | SYSTOLIC BLOOD PRESSURE: 135 MMHG | OXYGEN SATURATION: 100 % | RESPIRATION RATE: 18 BRPM

## 2021-02-07 DIAGNOSIS — I24.9 ACS (ACUTE CORONARY SYNDROME) (HCC): Primary | ICD-10-CM

## 2021-02-07 DIAGNOSIS — I10 UNCONTROLLED HYPERTENSION: ICD-10-CM

## 2021-02-07 DIAGNOSIS — R07.9 CHEST PAIN: ICD-10-CM

## 2021-02-07 LAB
ALBUMIN SERPL BCP-MCNC: 3.8 G/DL (ref 3.4–4.8)
ALP SERPL-CCNC: 53.9 U/L (ref 35–140)
ALT SERPL W P-5'-P-CCNC: 17 U/L (ref 5–54)
AMPHETAMINES SERPL QL SCN: NEGATIVE
ANION GAP SERPL CALCULATED.3IONS-SCNC: 5 MMOL/L (ref 4–13)
AST SERPL W P-5'-P-CCNC: 18 U/L (ref 15–41)
BARBITURATES UR QL: NEGATIVE
BASOPHILS # BLD AUTO: 0.03 THOUSANDS/ΜL (ref 0–0.1)
BASOPHILS NFR BLD AUTO: 1 % (ref 0–1)
BENZODIAZ UR QL: NEGATIVE
BILIRUB SERPL-MCNC: 0.27 MG/DL (ref 0.3–1.2)
BNP SERPL-MCNC: 665 PG/ML (ref 1–100)
BUN SERPL-MCNC: 25 MG/DL (ref 6–20)
CALCIUM SERPL-MCNC: 8.7 MG/DL (ref 8.4–10.2)
CHLORIDE SERPL-SCNC: 105 MMOL/L (ref 96–108)
CHOLEST SERPL-MCNC: 115 MG/DL
CO2 SERPL-SCNC: 28 MMOL/L (ref 22–33)
COCAINE UR QL: NEGATIVE
CREAT SERPL-MCNC: 1.47 MG/DL (ref 0.4–1.1)
EOSINOPHIL # BLD AUTO: 0.11 THOUSAND/ΜL (ref 0–0.61)
EOSINOPHIL NFR BLD AUTO: 2 % (ref 0–6)
ERYTHROCYTE [DISTWIDTH] IN BLOOD BY AUTOMATED COUNT: 13.5 % (ref 11.6–15.1)
GFR SERPL CREATININE-BSD FRML MDRD: 40 ML/MIN/1.73SQ M
GLUCOSE SERPL-MCNC: 118 MG/DL (ref 65–140)
HCT VFR BLD AUTO: 35.9 % (ref 34.8–46.1)
HDLC SERPL-MCNC: 42 MG/DL
HGB BLD-MCNC: 11.5 G/DL (ref 11.5–15.4)
IMM GRANULOCYTES # BLD AUTO: 0 THOUSAND/UL (ref 0–0.2)
IMM GRANULOCYTES NFR BLD AUTO: 0 % (ref 0–2)
LDLC SERPL CALC-MCNC: 57 MG/DL (ref 0–100)
LYMPHOCYTES # BLD AUTO: 1.76 THOUSANDS/ΜL (ref 0.6–4.47)
LYMPHOCYTES NFR BLD AUTO: 35 % (ref 14–44)
MAGNESIUM SERPL-MCNC: 1.9 MG/DL (ref 1.6–2.6)
MCH RBC QN AUTO: 27.4 PG (ref 26.8–34.3)
MCHC RBC AUTO-ENTMCNC: 32 G/DL (ref 31.4–37.4)
MCV RBC AUTO: 86 FL (ref 82–98)
METHADONE UR QL: NEGATIVE
MONOCYTES # BLD AUTO: 0.26 THOUSAND/ΜL (ref 0.17–1.22)
MONOCYTES NFR BLD AUTO: 5 % (ref 4–12)
NEUTROPHILS # BLD AUTO: 2.83 THOUSANDS/ΜL (ref 1.85–7.62)
NEUTS SEG NFR BLD AUTO: 57 % (ref 43–75)
NONHDLC SERPL-MCNC: 73 MG/DL
OPIATES UR QL SCN: NEGATIVE
OXYCODONE+OXYMORPHONE UR QL SCN: POSITIVE
PCP UR QL: NEGATIVE
PLATELET # BLD AUTO: 114 THOUSANDS/UL (ref 149–390)
PMV BLD AUTO: 11.6 FL (ref 8.9–12.7)
POTASSIUM SERPL-SCNC: 3.9 MMOL/L (ref 3.5–5)
PROT SERPL-MCNC: 6.4 G/DL (ref 6.4–8.3)
RBC # BLD AUTO: 4.19 MILLION/UL (ref 3.81–5.12)
SODIUM SERPL-SCNC: 138 MMOL/L (ref 133–145)
THC UR QL: NEGATIVE
TRIGL SERPL-MCNC: 80.9 MG/DL
TROPONIN I SERPL-MCNC: 0.06 NG/ML (ref 0–0.07)
TROPONIN I SERPL-MCNC: 0.07 NG/ML (ref 0–0.07)
TROPONIN I SERPL-MCNC: 0.07 NG/ML (ref 0–0.07)
WBC # BLD AUTO: 4.99 THOUSAND/UL (ref 4.31–10.16)

## 2021-02-07 PROCEDURE — 36415 COLL VENOUS BLD VENIPUNCTURE: CPT | Performed by: EMERGENCY MEDICINE

## 2021-02-07 PROCEDURE — 83735 ASSAY OF MAGNESIUM: CPT | Performed by: EMERGENCY MEDICINE

## 2021-02-07 PROCEDURE — 85025 COMPLETE CBC W/AUTO DIFF WBC: CPT | Performed by: EMERGENCY MEDICINE

## 2021-02-07 PROCEDURE — 80307 DRUG TEST PRSMV CHEM ANLYZR: CPT | Performed by: INTERNAL MEDICINE

## 2021-02-07 PROCEDURE — 99285 EMERGENCY DEPT VISIT HI MDM: CPT

## 2021-02-07 PROCEDURE — 93005 ELECTROCARDIOGRAM TRACING: CPT

## 2021-02-07 PROCEDURE — 83880 ASSAY OF NATRIURETIC PEPTIDE: CPT | Performed by: EMERGENCY MEDICINE

## 2021-02-07 PROCEDURE — 99285 EMERGENCY DEPT VISIT HI MDM: CPT | Performed by: EMERGENCY MEDICINE

## 2021-02-07 PROCEDURE — 71045 X-RAY EXAM CHEST 1 VIEW: CPT

## 2021-02-07 PROCEDURE — 84484 ASSAY OF TROPONIN QUANT: CPT | Performed by: INTERNAL MEDICINE

## 2021-02-07 PROCEDURE — 84484 ASSAY OF TROPONIN QUANT: CPT | Performed by: EMERGENCY MEDICINE

## 2021-02-07 PROCEDURE — 99236 HOSP IP/OBS SAME DATE HI 85: CPT | Performed by: INTERNAL MEDICINE

## 2021-02-07 PROCEDURE — 80053 COMPREHEN METABOLIC PANEL: CPT | Performed by: EMERGENCY MEDICINE

## 2021-02-07 PROCEDURE — 80061 LIPID PANEL: CPT | Performed by: INTERNAL MEDICINE

## 2021-02-07 RX ORDER — HEPARIN SODIUM 5000 [USP'U]/ML
5000 INJECTION, SOLUTION INTRAVENOUS; SUBCUTANEOUS EVERY 8 HOURS SCHEDULED
Status: DISCONTINUED | OUTPATIENT
Start: 2021-02-07 | End: 2021-02-07

## 2021-02-07 RX ORDER — FUROSEMIDE 20 MG/1
20 TABLET ORAL DAILY
Status: DISCONTINUED | OUTPATIENT
Start: 2021-02-07 | End: 2021-02-07 | Stop reason: HOSPADM

## 2021-02-07 RX ORDER — ASPIRIN 81 MG/1
81 TABLET, CHEWABLE ORAL DAILY
Status: DISCONTINUED | OUTPATIENT
Start: 2021-02-08 | End: 2021-02-07 | Stop reason: HOSPADM

## 2021-02-07 RX ORDER — SENNOSIDES 8.6 MG
1 TABLET ORAL DAILY
Status: DISCONTINUED | OUTPATIENT
Start: 2021-02-07 | End: 2021-02-07 | Stop reason: HOSPADM

## 2021-02-07 RX ORDER — LIDOCAINE 50 MG/G
1 PATCH TOPICAL DAILY
Status: DISCONTINUED | OUTPATIENT
Start: 2021-02-07 | End: 2021-02-07 | Stop reason: HOSPADM

## 2021-02-07 RX ORDER — DOCUSATE SODIUM 100 MG/1
100 CAPSULE, LIQUID FILLED ORAL 2 TIMES DAILY
Status: DISCONTINUED | OUTPATIENT
Start: 2021-02-07 | End: 2021-02-07 | Stop reason: HOSPADM

## 2021-02-07 RX ORDER — ONDANSETRON 2 MG/ML
4 INJECTION INTRAMUSCULAR; INTRAVENOUS EVERY 6 HOURS PRN
Status: DISCONTINUED | OUTPATIENT
Start: 2021-02-07 | End: 2021-02-07 | Stop reason: HOSPADM

## 2021-02-07 RX ORDER — PANTOPRAZOLE SODIUM 40 MG/1
40 TABLET, DELAYED RELEASE ORAL
Status: DISCONTINUED | OUTPATIENT
Start: 2021-02-07 | End: 2021-02-07 | Stop reason: HOSPADM

## 2021-02-07 RX ORDER — ACETAMINOPHEN 325 MG/1
650 TABLET ORAL EVERY 6 HOURS PRN
Status: DISCONTINUED | OUTPATIENT
Start: 2021-02-07 | End: 2021-02-07 | Stop reason: HOSPADM

## 2021-02-07 RX ORDER — NITROGLYCERIN 0.4 MG/1
0.4 TABLET SUBLINGUAL
Status: DISCONTINUED | OUTPATIENT
Start: 2021-02-07 | End: 2021-02-07 | Stop reason: HOSPADM

## 2021-02-07 RX ORDER — POLYETHYLENE GLYCOL 3350 17 G/17G
17 POWDER, FOR SOLUTION ORAL DAILY
Status: DISCONTINUED | OUTPATIENT
Start: 2021-02-07 | End: 2021-02-07 | Stop reason: HOSPADM

## 2021-02-07 RX ORDER — ALBUTEROL SULFATE 90 UG/1
2 AEROSOL, METERED RESPIRATORY (INHALATION) EVERY 6 HOURS PRN
Status: DISCONTINUED | OUTPATIENT
Start: 2021-02-07 | End: 2021-02-07 | Stop reason: HOSPADM

## 2021-02-07 RX ORDER — NICOTINE 21 MG/24HR
1 PATCH, TRANSDERMAL 24 HOURS TRANSDERMAL DAILY
Status: DISCONTINUED | OUTPATIENT
Start: 2021-02-07 | End: 2021-02-07 | Stop reason: HOSPADM

## 2021-02-07 RX ORDER — SODIUM CHLORIDE 9 MG/ML
3 INJECTION INTRAVENOUS
Status: DISCONTINUED | OUTPATIENT
Start: 2021-02-07 | End: 2021-02-07 | Stop reason: HOSPADM

## 2021-02-07 RX ADMIN — PANTOPRAZOLE SODIUM 40 MG: 40 TABLET, DELAYED RELEASE ORAL at 08:24

## 2021-02-07 RX ADMIN — ACETAMINOPHEN 650 MG: 325 TABLET ORAL at 04:20

## 2021-02-07 RX ADMIN — METOPROLOL SUCCINATE 150 MG: 100 TABLET, EXTENDED RELEASE ORAL at 08:22

## 2021-02-07 RX ADMIN — FUROSEMIDE 20 MG: 20 TABLET ORAL at 08:21

## 2021-02-07 RX ADMIN — NICOTINE 1 PATCH: 14 PATCH, EXTENDED RELEASE TRANSDERMAL at 08:26

## 2021-02-07 RX ADMIN — RIVAROXABAN 20 MG: 20 TABLET, FILM COATED ORAL at 08:22

## 2021-02-07 RX ADMIN — LIDOCAINE 5% 1 PATCH: 700 PATCH TOPICAL at 08:26

## 2021-02-07 NOTE — ASSESSMENT & PLAN NOTE
Chest pain r/ ACS  Presented with retrosternal CP since this morning  Assoc with dizziness  CP relieved by nitro given by ems  EKG Paced rhythm, no acute ST changes  S/p nitroglycerin SL  q5prn for max 3 doses  Continue home dose of metoprolol  Troponin x3 negative  Serial EKG  Telemetry monitoring  Advised to follow-up outpatient cardiologist Dr Beth Deng  Discharge with outpatient stress test requisition

## 2021-02-07 NOTE — PLAN OF CARE
Problem: Potential for Falls  Goal: Patient will remain free of falls  Description: INTERVENTIONS:  - Assess patient frequently for physical needs  -  Identify cognitive and physical deficits and behaviors that affect risk of falls    -  [place] fall precautions as indicated by assessment   - Educate patient/family on patient safety including physical limitations  - Instruct patient to call for assistance with activity based on assessment  - Modify environment to reduce risk of injury  - Consider OT/PT consult to assist with strengthening/mobility  Outcome: Progressing     Problem: PAIN - ADULT  Goal: Verbalizes/displays adequate comfort level or baseline comfort level  Description: Interventions:  - Encourage patient to monitor pain and request assistance  - Assess pain using appropriate pain scale  - Administer analgesics based on type and severity of pain and evaluate response  - Implement non-pharmacological measures as appropriate and evaluate response  - Consider cultural and social influences on pain and pain management  - Notify physician/advanced practitioner if interventions unsuccessful or patient reports new pain  Outcome: Progressing     Problem: INFECTION - ADULT  Goal: Absence or prevention of progression during hospitalization  Description: INTERVENTIONS:  - Assess and monitor for signs and symptoms of infection  - Monitor lab/diagnostic results  - Monitor all insertion sites, i e  indwelling lines, tubes, and drains  - Administer medications as ordered  - Instruct and encourage patient and family to use good hand hygiene technique  - Identify and instruct in appropriate isolation precautions for identified infection/condition  Outcome: Progressing  Goal: Absence of fever/infection during neutropenic period  Description: INTERVENTIONS:  - Monitor WBC    Outcome: Progressing     Problem: SAFETY ADULT  Goal: Patient will remain free of falls  Description: INTERVENTIONS:  - Assess patient frequently for physical needs  -  Identify cognitive and physical deficits and behaviors that affect risk of falls    -  [place] fall precautions as indicated by assessment   - Educate patient/family on patient safety including physical limitations  - Instruct patient to call for assistance with activity based on assessment  - Modify environment to reduce risk of injury  - Consider OT/PT consult to assist with strengthening/mobility  Outcome: Progressing  Goal: Maintain or return to baseline ADL function  Description: INTERVENTIONS:  -  Assess patient's ability to carry out ADLs; assess patient's baseline for ADL function and identify physical deficits which impact ability to perform ADLs (bathing, care of mouth/teeth, toileting, grooming, dressing, etc )  - Assess/evaluate cause of self-care deficits   - Assess range of motion  - Assess patient's mobility; develop plan if impaired  - Assess patient's need for assistive devices and provide as appropriate  - Encourage maximum independence but intervene and supervise when necessary  - Involve family in performance of ADLs  - Assess for home care needs following discharge   - Consider OT consult to assist with ADL evaluation and planning for discharge  - Provide patient education as appropriate  Outcome: Progressing  Goal: Maintain or return mobility status to optimal level  Description: INTERVENTIONS:  - Assess patient's baseline mobility status (ambulation, transfers, stairs, etc )    - Identify cognitive and physical deficits and behaviors that affect mobility  - Identify mobility aids required to assist with transfers and/or ambulation (gait belt, sit-to-stand, lift, walker, cane, etc )  - [place] fall precautions as indicated by assessment  - Record patient progress and toleration of activity level on Mobility SBAR; progress patient to next Phase/Stage  - Instruct patient to call for assistance with activity based on assessment  - Consider rehabilitation consult to assist with strengthening/weightbearing, etc   Outcome: Progressing     Problem: DISCHARGE PLANNING  Goal: Discharge to home or other facility with appropriate resources  Description: INTERVENTIONS:  - Identify barriers to discharge w/patient and caregiver  - Arrange for needed discharge resources and transportation as appropriate  - Identify discharge learning needs (meds, wound care, etc )  - Arrange for interpretive services to assist at discharge as needed  - Refer to Case Management Department for coordinating discharge planning if the patient needs post-hospital services based on physician/advanced practitioner order or complex needs related to functional status, cognitive ability, or social support system  Outcome: Progressing     Problem: Knowledge Deficit  Goal: Patient/family/caregiver demonstrates understanding of disease process, treatment plan, medications, and discharge instructions  Description: Complete learning assessment and assess knowledge base    Interventions:  - Provide teaching at level of understanding  - Provide teaching via preferred learning methods  Outcome: Progressing

## 2021-02-07 NOTE — DISCHARGE SUMMARY
Discharge Summary - [address] [facility]    Discharge- Ethelda Habermann 1965, 54 y o  female MRN: [id number]    Unit/Bed#: -01 Encounter: [id number]    Primary Care Provider: Marylou Leonard MD   Date and time admitted to hospital: 2/7/2021 12:55 AM        Patient Information: Ethelda Habermann 54 y o  female MRN: [id number]  Unit/Bed#: -01 Encounter: [id number]    Discharging Physician / Practitioner: Migdalia Sifuentes MD  PCP: Marylou Leonard MD  Admission Date: 2/7/2021  Discharge Date: 02/07/21    Disposition:     Home    Reason for Admission:  Chest pain rule out ACS    Discharge Diagnoses:     Principal Problem:    Chest pain  Active Problems:    Uncontrolled hypertension    Nicotine dependence    Stage 3 chronic kidney disease    Chronic diastolic CHF (congestive heart failure) ([facility])    ACS (acute coronary syndrome) ([address] )  Resolved Problems:    * No resolved hospital problems  *      * Chest pain  Assessment & Plan  Chest pain r/ ACS  Presented with retrosternal CP since this morning  Assoc with dizziness  CP relieved by nitro given by ems  EKG Paced rhythm, no acute ST changes  S/p nitroglycerin SL  q5prn for max 3 doses  Continue home dose of metoprolol  Troponin x3 negative  Serial EKG  Telemetry monitoring  Advised to follow-up outpatient cardiologist Dr Edi Small  Discharge with outpatient stress test requisition  Chronic diastolic CHF (congestive heart failure) ([facility])  Assessment & Plan  Wt Readings from Last 3 Encounters:   02/07/21 97 8 kg (215 lb 9 8 oz)   02/04/21 94 8 kg (209 lb)   01/13/21 95 3 kg (210 lb)       Continue home lasix  Daily weighing  I/O   Low salt/cardiac diet      Stage 3 chronic kidney disease  Assessment & Plan  Lab Results   Component Value Date    EGFR 40 02/07/2021    EGFR 38 02/04/2021    EGFR 32 01/21/2021    CREATININE 1 47 (H) 02/07/2021    CREATININE 1 52 (H) 02/04/2021    CREATININE 1 77 (H) 01/21/2021     Cr currently at baseline  Monitor I/O  Trend creatinine  Nicotine dependence  Assessment & Plan  Continue nicotine patch  Uncontrolled hypertension  Assessment & Plan  Poorly controlled HTN at home   Also improved with nitroglycerin  Continue home dose of antihypertensives  Consultations During Hospital Stay:  · None    Procedures Performed:     · Telemetry     Outpatient Tests Requested:  · Echo Stress test    Complications:  None    Hospital Course:     55 yo  female with a past medical history of atrial fibrillation on Xarelto status post ppm/AICD, diastolic CHF with EF of 28%, hypertension , Nicotine dependence continues to smoke 1PPD who presented to the [facility] ED on 02/07/2021 due to chest pain  Patient was sitting and talking to  and the at this morning when she developed chest pain, chest pain is retrosternal, pressure-like/heaviness, nonradiating, lasted for about  a While, 6-8/10 intensity, no  aggravating factor but relieved by nitroglycerin given by EMS  Associated with dizziness, lightheadedness, shortness of breath  Blood pressure was elevated to 200s, palpitations  Denied diaphoresis, nausea, vomiting, syncope, abdominal pain, cough, fever, chills, headaches    called EMS and patient was brought to the ED  Patient presented to the ED area in the week with similar symptoms under elevated troponin however declined admission and left AMA  She noted worsening SOB on exertion for some few days now  At the ED, vitals were stable, blood pressure 127/69, heart rate 67 beats per minute, oxygen  saturation 99% on room air  Labs showed CBC unremarkable, electrolytes within normal limits, BUN 25, creatinine 1 47, , magnesium 1 9, EKG no acute ST changes  Chest x-ray no acute cardiopulmonary findings  She was admitted for observation for chest pain rule out ACS  Troponin x3 was negative  EKG showed no significant changes from baseline    Subsequently reported improvement in chest pain  Advised to follow-up with her primary cardiologist in the next 2 weeks  She was given a script for outpatient echo stress test       Condition at Discharge: stable     Discharge Day Visit / Exam:     Subjective:  Patient seen and examined at bedside  Reports no chest pain, cough, leg swelling, difficulty in breathing  Appears clinically and hemodynamically stable for discharge at this time  Advised to follow-up with her PCP and primary cardiologist     Jason Lank: Blood Pressure: 135/74 (02/07/21 0731)  Pulse: 60 (02/07/21 0731)  Temperature: 98 2 °F (36 8 °C) (02/07/21 0731)  Temp Source: Tympanic (02/07/21 0731)  Respirations: 18 (02/07/21 0731)  Height: 5' 7" (170 2 cm) (02/07/21 0330)  Weight - Scale: 97 8 kg (215 lb 9 8 oz) (02/07/21 0330)  SpO2: 100 % (02/07/21 0731)  Exam:   Physical Exam  Vitals signs reviewed  Constitutional:       General: She is not in acute distress  Appearance: She is not toxic-appearing  HENT:      Head: Normocephalic  Mouth/Throat:      Mouth: Mucous membranes are moist    Cardiovascular:      Rate and Rhythm: Normal rate  Pulses: Normal pulses  Pulmonary:      Effort: Pulmonary effort is normal  No respiratory distress  Breath sounds: Normal breath sounds  No wheezing  Abdominal:      General: Bowel sounds are normal  There is no distension  Palpations: Abdomen is soft  Tenderness: There is no abdominal tenderness  Musculoskeletal:      Left lower leg: No edema  Neurological:      General: No focal deficit present  Mental Status: She is alert and oriented to person, place, and time  Mental status is at baseline  Psychiatric:         Mood and Affect: Mood normal        Discussion with Family:  None at bedside    Discharge instructions/Information to patient and family:   See after visit summary for information provided to patient and family        Provisions for Follow-Up Care:  See after visit summary for information related to follow-up care and any pertinent home health orders  Planned Readmission: No     Discharge Statement:  I spent 35 minutes discharging the patient  This time was spent on the day of discharge  I had direct contact with the patient on the day of discharge  Greater than 50% of the total time was spent examining patient, answering all patient questions, arranging and discussing plan of care with patient as well as directly providing post-discharge instructions  Additional time then spent on discharge activities  Discharge Medications:  See after visit summary for reconciled discharge medications provided to patient and family        ** Please Note: This note has been constructed using a voice recognition system **

## 2021-02-07 NOTE — UTILIZATION REVIEW
Initial Clinical Review    Admission: Date/Time/Statement:   Admission Orders (From admission, onward)     Ordered        02/07/21 0236  Place in Observation  Once                   Orders Placed This Encounter   Procedures    Place in Observation     Standing Status:   Standing     Number of Occurrences:   1     Order Specific Question:   Level of Care     Answer:   Med Surg [16]     Order Specific Question:   Bed Type     Answer:   Lane [4]     Order Specific Question:   Bed request comments     Answer:   tele     ED Arrival Information     Expected Arrival Acuity Means of Arrival Escorted By Service Admission Type    - 2/7/2021 00:52 Emergent Ambulance [facility] Emergency [facility] Hospitalist Emergency    Arrival Complaint    htn/cp        Chief Complaint   Patient presents with    Hypertension     Patient reports high blood pressure (SBP over 200) accompanied by chest pain     Assessment/Plan: 53 yo  female with a past medical history of atrial fibrillation on Xarelto status post ppm/AICD, diastolic CHF with EF of 66%, hypertension , Nicotine dependence continues to smoke 1PPD who presented to the [facility] ED on 02/07/2021 due to chest pain  Patient was sitting and talking to  and the at this morning when she developed chest pain, chest pain is retrosternal, pressure-like/heaviness, nonradiating, lasted for about  a While, 6-8/10 intensity, no  aggravating factor but relieved by nitroglycerin given by EMS  Associated with dizziness, lightheadedness, shortness of breath  Blood pressure was elevated to 200s, palpitations  Denied diaphoresis, nausea, vomiting, syncope, abdominal pain, cough, fever, chills, headaches    called EMS and patient was brought to the ED  She had recently visited ED and with same symptoms and signed out AMA  She noted worsening SOB on exertion for some few days now  At the ED, vitals were stable, blood pressure 127/69, heart rate 67 beats per minute, oxygen  saturation 99% on room air  Labs showed CBC unremarkable, electrolytes within normal limits, BUN 25, creatinine 1 47, , magnesium 1 9, EKG no acute ST changes  Chest x-ray no acute cardiopulmonary findings  She was admitted for observation for chest pain rule out ACS  Chest pain  Assessment & Plan  Chest dayna r/ ACS  Presented with retrosternal CP since this morning  Assoc with dizziness  CP relieved by nitro given by ems  EKG Paced rhythm, no acute ST changes  S/p nitroglycerin SL  q5prn for max 3 doses  Continue home dose of metoprolol  Trend troponin x 3  Serial EKG  Telemetry monitoring  Consider cardiology consult vs outpatient cardiology follow up and stress test   Chronic diastolic CHF (congestive heart failure) ([facility] [address] )  Assessment & Plan      Wt Readings from Last 3 Encounters:   02/07/21 97 8 kg (215 lb 9 8 oz)   02/04/21 94 8 kg (209 lb)   01/13/21 95 3 kg (210 lb)     Continue home lasix  Daily weighing  I/O   Low salt/cardiac diet    Nicotine dependence  Assessment & Plan  Continue nicotine patch  Uncontrolled hypertension  Assessment & Plan  Poorly controlled HTN at home   Also improved with nitroglycerin    Continue home dose of antihypertensives        ED Triage Vitals   Temperature Pulse Respirations Blood Pressure SpO2   02/07/21 0117 02/07/21 0117 02/07/21 0117 02/07/21 0117 02/07/21 0117   97 9 °F (36 6 °C) 67 18 127/69 99 %      Temp Source Heart Rate Source Patient Position - Orthostatic VS BP Location FiO2 (%)   02/07/21 0117 02/07/21 0117 02/07/21 0117 02/07/21 0117 --   Oral Monitor Lying Right arm       Pain Score       02/07/21 0408       8          Wt Readings from Last 1 Encounters:   02/07/21 97 8 kg (215 lb 9 8 oz)     Additional Vital Signs:   02/07/21 0731  98 2 °F (36 8 °C)  60  18  135/74  --  100 %  None (Room air)  Lying   02/07/21 0330  97 8 °F (36 6 °C)  60  16  137/80  104  98 %  None (Room air)  Lying   02/07/21 0200  --  60  16  144/62  --  99 %  None (Room air)  Lying   02/07/21 0117  97 9 °F (36 6 °C)  67  18  127/69  --  99 %           Pertinent Labs/Diagnostic Test Results:   cxr  Results from last 7 days   Lab Units 02/07/21  0111 02/04/21  1431   WBC Thousand/uL 4 99 7 17   HEMOGLOBIN g/dL 11 5 13 8   HEMATOCRIT % 35 9 43 1   PLATELETS Thousands/uL 114* 141*   NEUTROS ABS Thousands/µL 2 83 5 02     Results from last 7 days   Lab Units 02/07/21  0111 02/04/21  1431   SODIUM mmol/L 138 142   POTASSIUM mmol/L 3 9 4 6   CHLORIDE mmol/L 105 104   CO2 mmol/L 28 30   ANION GAP mmol/L 5 8   BUN mg/dL 25* 17   CREATININE mg/dL 1 47* 1 52*   EGFR ml/min/1 73sq m 40 38   CALCIUM mg/dL 8 7 9 9   MAGNESIUM mg/dL 1 9  --      Results from last 7 days   Lab Units 02/07/21  0111 02/04/21  1431   AST U/L 18 25   ALT U/L 17 19   ALK PHOS U/L 53 9 58 3   TOTAL PROTEIN g/dL 6 4 7 5   ALBUMIN g/dL 3 8 4 4   TOTAL BILIRUBIN mg/dL 0 27* 0 61     Results from last 7 days   Lab Units 02/07/21  0111 02/04/21  1431   GLUCOSE RANDOM mg/dL 118 89     Results from last 7 days   Lab Units 02/07/21  0647 02/07/21  0111 02/04/21  1431   TROPONIN I ng/mL 0 07 0 06 0 10*     Results from last 7 days   Lab Units 02/07/21  0111   BNP pg/mL 665 0*       Past Medical History:   Diagnosis Date    Arrhythmia     Arthritis     Atrial fibrillation (HCC)     Breast lump     CKD (chronic kidney disease) stage 3, GFR 30-59 ml/min     Disease of thyroid gland     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 ([facility] [address] ) 5/25/2020    GERD (gastroesophageal reflux disease)     H/O transfusion 1987    Hepatitis C     resolved    Hepatitis C     Hyperlipidemia     Hypertension     Irregular heart beat     Pacemaker     Sleep apnea     no cpap    Tachycardia      Present on Admission:   ACS (acute coronary syndrome) (HCC)   Chest pain   Chronic diastolic CHF (congestive heart failure) (HCC)   Nicotine dependence   Stage 3 chronic kidney disease      Admitting Diagnosis: Chest pain [R07 9]  Hypertension [I10]  ACS (acute coronary syndrome) ([facility] [address] ) [I24 9]  Uncontrolled hypertension [I10]  Age/Sex: 54 y o  female  Admission Orders:  TELE MON  cmp mg cbc diff  Scheduled Medications:  [START ON 2/8/2021] aspirin, 81 mg, Oral, Daily  docusate sodium, 100 mg, Oral, BID  furosemide, 20 mg, Oral, Daily  lidocaine, 1 patch, Topical, Daily  metoprolol succinate, 150 mg, Oral, BID  nicotine, 1 patch, Transdermal, Daily  pantoprazole, 40 mg, Oral, Early Morning  polyethylene glycol, 17 g, Oral, Daily  rivaroxaban, 20 mg, Oral, Daily With Breakfast  senna, 1 tablet, Oral, Daily      Continuous IV Infusions:     PRN Meds:  acetaminophen, 650 mg, Oral, Q6H PRN  albuterol, 2 puff, Inhalation, Q6H PRN  nitroglycerin, 0 4 mg, Sublingual, Q5 Min PRN  ondansetron, 4 mg, Intravenous, Q6H PRN  sodium chloride (PF), 3 mL, Intravenous, Q1H PRN        None    Network Utilization Review Department  ATTENTION: Please call with any questions or concerns to [phone] and carefully listen to the prompts so that you are directed to the right person  All voicemails are confidential   Polly Meehan all requests for admission clinical reviews, approved or denied determinations and any other requests to dedicated fax number below belonging to the campus where the patient is receiving treatment   List of dedicated fax numbers for the Facilities:  [address] DENIALS (Administrative/Medical Necessity) [phone]   [address] (Maternity/NICU/Pediatrics) [phone]   [address] Nelida Mcclure 1109 (  Fabiana Abernathy "Trina" 103) [address] [address] [address] [address] [place]) [facility] [phone]

## 2021-02-07 NOTE — PROGRESS NOTES
D/c order received, instructions are given to the patient, questions answered, IV removed  Prescription for ECHO given,  Pt left the floor by wheelchair accompanied by  RN to go home
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
## 2021-02-07 NOTE — ED NOTES
Patient received two doses of Nitro SL and 325mg ASA prior to arrival     Sathya Galloway, [facility]  02/07/21 0131

## 2021-02-07 NOTE — ASSESSMENT & PLAN NOTE
Wt Readings from Last 3 Encounters:   02/07/21 97 8 kg (215 lb 9 8 oz)   02/04/21 94 8 kg (209 lb)   01/13/21 95 3 kg (210 lb)       Continue home lasix  Daily weighing  I/O   Low salt/cardiac diet

## 2021-02-07 NOTE — ASSESSMENT & PLAN NOTE
Poorly controlled HTN at home   Also improved with nitroglycerin  Continue home dose of antihypertensives

## 2021-02-07 NOTE — ED PROCEDURE NOTE
PROCEDURE  ECG 12 Lead Documentation Only    Date/Time: 2/7/2021 1:10 AM  Performed by: Neda Rizzo MD  Authorized by:  Neda Rizzo MD     Indications / Diagnosis:  Chest pain  ECG reviewed by me, the ED Provider: yes    Patient location:  Bedside  Previous ECG:     Previous ECG:  Compared to current    Comparison ECG info:  No changes from 2/4/2021 and 1/21/21    Similarity:  No change    Comparison to cardiac monitor: Yes    Interpretation:     Interpretation: abnormal    Rate:     ECG rate:  60    ECG rate assessment: normal    Rhythm:     Rhythm: paced    Pacing:     Capture:  Complete    Type of pacing:  Atrial  Ectopy:     Ectopy: none    QRS:     QRS axis:  Right    QRS intervals:  Normal  Conduction:     Conduction: abnormal    ST segments:     ST segments:  Normal  Comments:      No acute ischemia or infarction         Neda Rizzo MD  02/07/21 0230

## 2021-02-07 NOTE — DISCHARGE INSTR - AVS FIRST PAGE
DISCHARGE INSTRUCTIONS FROM HOSPITALIST   1  Follow up with your primary care physician in one week  in regards to recent hospitalization   2  Take medications regularly   No medications were changed  3  Come back to the ER if symptoms recur or worsen   4  Activity as tolerated  5   Diet : cardiac healthy

## 2021-02-07 NOTE — ASSESSMENT & PLAN NOTE
Lab Results   Component Value Date    EGFR 40 02/07/2021    EGFR 38 02/04/2021    EGFR 32 01/21/2021    CREATININE 1 47 (H) 02/07/2021    CREATININE 1 52 (H) 02/04/2021    CREATININE 1 77 (H) 01/21/2021     Cr currently at baseline  Monitor I/O  Trend creatinine
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
## 2021-02-08 LAB
ATRIAL RATE: 59 BPM
ATRIAL RATE: 60 BPM
P AXIS: -70 DEGREES
P AXIS: 116 DEGREES
P AXIS: 44 DEGREES
P AXIS: 46 DEGREES
PR INTERVAL: 166 MS
PR INTERVAL: 215 MS
PR INTERVAL: 220 MS
PR INTERVAL: 60 MS
QRS AXIS: -47 DEGREES
QRS AXIS: -48 DEGREES
QRS AXIS: -49 DEGREES
QRS AXIS: -58 DEGREES
QRSD INTERVAL: 141 MS
QRSD INTERVAL: 168 MS
QRSD INTERVAL: 169 MS
QRSD INTERVAL: 173 MS
QT INTERVAL: 454 MS
QT INTERVAL: 464 MS
QT INTERVAL: 485 MS
QT INTERVAL: 487 MS
QTC INTERVAL: 454 MS
QTC INTERVAL: 464 MS
QTC INTERVAL: 485 MS
QTC INTERVAL: 487 MS
T WAVE AXIS: 112 DEGREES
T WAVE AXIS: 120 DEGREES
T WAVE AXIS: 123 DEGREES
T WAVE AXIS: 249 DEGREES
VENTRICULAR RATE: 60 BPM

## 2021-02-08 PROCEDURE — 93010 ELECTROCARDIOGRAM REPORT: CPT | Performed by: INTERNAL MEDICINE

## 2021-02-26 ENCOUNTER — TELEPHONE (OUTPATIENT)
Dept: NEPHROLOGY | Facility: CLINIC | Age: 56
End: 2021-02-26

## 2021-02-26 DIAGNOSIS — E87.5 HYPERKALEMIA: ICD-10-CM

## 2021-02-26 DIAGNOSIS — N18.30 STAGE 3 CHRONIC KIDNEY DISEASE, UNSPECIFIED WHETHER STAGE 3A OR 3B CKD (HCC): ICD-10-CM

## 2021-02-26 DIAGNOSIS — I12.9 BENIGN HYPERTENSION WITH CKD (CHRONIC KIDNEY DISEASE) STAGE III (HCC): Primary | ICD-10-CM

## 2021-02-26 DIAGNOSIS — N18.30 BENIGN HYPERTENSION WITH CKD (CHRONIC KIDNEY DISEASE) STAGE III (HCC): Primary | ICD-10-CM

## 2021-02-26 RX ORDER — FAMOTIDINE 20 MG/1
TABLET, FILM COATED ORAL
Qty: 2 TABLET | OUTPATIENT
Start: 2021-02-26

## 2021-02-26 NOTE — TELEPHONE ENCOUNTER
Patient able to go for labs prior to her appt once orders have been placed in epic  Please advise on what is needed  Thank you

## 2021-03-04 DIAGNOSIS — I48.91 ATRIAL FIBRILLATION, UNSPECIFIED TYPE (HCC): Chronic | ICD-10-CM

## 2021-03-05 RX ORDER — RIVAROXABAN 20 MG/1
TABLET, FILM COATED ORAL
Qty: 30 TABLET | Refills: 1 | Status: SHIPPED | OUTPATIENT
Start: 2021-03-05 | End: 2021-05-28

## 2021-03-06 ENCOUNTER — HOSPITAL ENCOUNTER (EMERGENCY)
Facility: HOSPITAL | Age: 56
Discharge: HOME | End: 2021-03-06
Attending: EMERGENCY MEDICINE | Admitting: EMERGENCY MEDICINE
Payer: COMMERCIAL

## 2021-03-06 ENCOUNTER — APPOINTMENT (EMERGENCY)
Dept: RADIOLOGY | Facility: HOSPITAL | Age: 56
End: 2021-03-06
Payer: COMMERCIAL

## 2021-03-06 VITALS
RESPIRATION RATE: 20 BRPM | OXYGEN SATURATION: 97 % | WEIGHT: 215.61 LBS | TEMPERATURE: 98.2 F | SYSTOLIC BLOOD PRESSURE: 202 MMHG | DIASTOLIC BLOOD PRESSURE: 89 MMHG | BODY MASS INDEX: 33.77 KG/M2 | HEART RATE: 56 BPM

## 2021-03-06 DIAGNOSIS — M19.90 DEGENERATIVE JOINT DISEASE: ICD-10-CM

## 2021-03-06 DIAGNOSIS — M79.644 THUMB PAIN, RIGHT: Primary | ICD-10-CM

## 2021-03-06 DIAGNOSIS — I10 CHRONIC HYPERTENSION: ICD-10-CM

## 2021-03-06 PROCEDURE — 99283 EMERGENCY DEPT VISIT LOW MDM: CPT

## 2021-03-06 PROCEDURE — 73140 X-RAY EXAM OF FINGER(S): CPT

## 2021-03-06 PROCEDURE — 29130 APPL FINGER SPLINT STATIC: CPT | Performed by: EMERGENCY MEDICINE

## 2021-03-06 PROCEDURE — 99284 EMERGENCY DEPT VISIT MOD MDM: CPT | Performed by: EMERGENCY MEDICINE

## 2021-03-06 RX ORDER — OXYCODONE HYDROCHLORIDE AND ACETAMINOPHEN 5; 325 MG/1; MG/1
1 TABLET ORAL EVERY 6 HOURS PRN
Qty: 12 TABLET | Refills: 0 | Status: SHIPPED | OUTPATIENT
Start: 2021-03-06 | End: 2021-03-11 | Stop reason: HOSPADM

## 2021-03-06 RX ORDER — OXYCODONE HYDROCHLORIDE AND ACETAMINOPHEN 5; 325 MG/1; MG/1
1 TABLET ORAL ONCE
Status: COMPLETED | OUTPATIENT
Start: 2021-03-06 | End: 2021-03-06

## 2021-03-06 RX ADMIN — OXYCODONE HYDROCHLORIDE AND ACETAMINOPHEN 1 TABLET: 5; 325 TABLET ORAL at 18:29

## 2021-03-06 NOTE — Clinical Note
Donald Vazquez was seen and treated in our emergency department on 3/6/2021  Other - See Comments    light duty until cleared by hand specialist    Diagnosis:     Kwesi Osuna    She may return on this date: If you have any questions or concerns, please don't hesitate to call        Kenia Franklin DO    ______________________________           _______________          _______________  Hospital Representative                              Date                                Time
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
## 2021-03-10 ENCOUNTER — HOSPITAL ENCOUNTER (OUTPATIENT)
Facility: HOSPITAL | Age: 56
Setting detail: OBSERVATION
Discharge: HOME | End: 2021-03-11
Attending: EMERGENCY MEDICINE | Admitting: INTERNAL MEDICINE
Payer: COMMERCIAL

## 2021-03-10 ENCOUNTER — APPOINTMENT (EMERGENCY)
Dept: RADIOLOGY | Facility: HOSPITAL | Age: 56
End: 2021-03-10
Payer: COMMERCIAL

## 2021-03-10 DIAGNOSIS — I48.91 ATRIAL FIBRILLATION WITH RVR (HCC): ICD-10-CM

## 2021-03-10 DIAGNOSIS — I48.91 ATRIAL FIBRILLATION WITH RAPID VENTRICULAR RESPONSE (HCC): Primary | ICD-10-CM

## 2021-03-10 DIAGNOSIS — I12.9 BENIGN HYPERTENSION WITH CKD (CHRONIC KIDNEY DISEASE) STAGE III (HCC): ICD-10-CM

## 2021-03-10 DIAGNOSIS — R06.00 DYSPNEA: ICD-10-CM

## 2021-03-10 DIAGNOSIS — R77.8 ELEVATED TROPONIN: ICD-10-CM

## 2021-03-10 DIAGNOSIS — N18.30 BENIGN HYPERTENSION WITH CKD (CHRONIC KIDNEY DISEASE) STAGE III (HCC): ICD-10-CM

## 2021-03-10 DIAGNOSIS — Z72.0 TOBACCO ABUSE: ICD-10-CM

## 2021-03-10 LAB
ALBUMIN SERPL BCP-MCNC: 4.2 G/DL (ref 3.5–5)
ALP SERPL-CCNC: 66 U/L (ref 46–116)
ALT SERPL W P-5'-P-CCNC: 26 U/L (ref 12–78)
ANION GAP SERPL CALCULATED.3IONS-SCNC: 8 MMOL/L (ref 4–13)
AST SERPL W P-5'-P-CCNC: 25 U/L (ref 5–45)
BASOPHILS # BLD AUTO: 0.03 THOUSANDS/ΜL (ref 0–0.1)
BASOPHILS NFR BLD AUTO: 1 % (ref 0–1)
BILIRUB SERPL-MCNC: 0.38 MG/DL (ref 0.2–1)
BUN SERPL-MCNC: 19 MG/DL (ref 5–25)
CALCIUM SERPL-MCNC: 9.2 MG/DL (ref 8.3–10.1)
CHLORIDE SERPL-SCNC: 106 MMOL/L (ref 100–108)
CO2 SERPL-SCNC: 28 MMOL/L (ref 21–32)
CREAT SERPL-MCNC: 1.62 MG/DL (ref 0.6–1.3)
EOSINOPHIL # BLD AUTO: 0.06 THOUSAND/ΜL (ref 0–0.61)
EOSINOPHIL NFR BLD AUTO: 1 % (ref 0–6)
ERYTHROCYTE [DISTWIDTH] IN BLOOD BY AUTOMATED COUNT: 13.4 % (ref 11.6–15.1)
GFR SERPL CREATININE-BSD FRML MDRD: 35 ML/MIN/1.73SQ M
GLUCOSE SERPL-MCNC: 102 MG/DL (ref 65–140)
HCT VFR BLD AUTO: 41.3 % (ref 34.8–46.1)
HGB BLD-MCNC: 12.9 G/DL (ref 11.5–15.4)
IMM GRANULOCYTES # BLD AUTO: 0.02 THOUSAND/UL (ref 0–0.2)
IMM GRANULOCYTES NFR BLD AUTO: 0 % (ref 0–2)
LYMPHOCYTES # BLD AUTO: 1.52 THOUSANDS/ΜL (ref 0.6–4.47)
LYMPHOCYTES NFR BLD AUTO: 23 % (ref 14–44)
MAGNESIUM SERPL-MCNC: 2 MG/DL (ref 1.6–2.6)
MCH RBC QN AUTO: 26.7 PG (ref 26.8–34.3)
MCHC RBC AUTO-ENTMCNC: 31.2 G/DL (ref 31.4–37.4)
MCV RBC AUTO: 86 FL (ref 82–98)
MONOCYTES # BLD AUTO: 0.43 THOUSAND/ΜL (ref 0.17–1.22)
MONOCYTES NFR BLD AUTO: 7 % (ref 4–12)
NEUTROPHILS # BLD AUTO: 4.53 THOUSANDS/ΜL (ref 1.85–7.62)
NEUTS SEG NFR BLD AUTO: 68 % (ref 43–75)
NRBC BLD AUTO-RTO: 0 /100 WBCS
NT-PROBNP SERPL-MCNC: 5710 PG/ML
PLATELET # BLD AUTO: 145 THOUSANDS/UL (ref 149–390)
PMV BLD AUTO: 11.5 FL (ref 8.9–12.7)
POTASSIUM SERPL-SCNC: 3.5 MMOL/L (ref 3.5–5.3)
PROT SERPL-MCNC: 8.1 G/DL (ref 6.4–8.2)
RBC # BLD AUTO: 4.83 MILLION/UL (ref 3.81–5.12)
SODIUM SERPL-SCNC: 142 MMOL/L (ref 136–145)
TROPONIN I SERPL-MCNC: 0.13 NG/ML
TSH SERPL DL<=0.05 MIU/L-ACNC: 1.2 UIU/ML (ref 0.36–3.74)
WBC # BLD AUTO: 6.59 THOUSAND/UL (ref 4.31–10.16)

## 2021-03-10 PROCEDURE — 99285 EMERGENCY DEPT VISIT HI MDM: CPT

## 2021-03-10 PROCEDURE — 84484 ASSAY OF TROPONIN QUANT: CPT | Performed by: EMERGENCY MEDICINE

## 2021-03-10 PROCEDURE — 96374 THER/PROPH/DIAG INJ IV PUSH: CPT

## 2021-03-10 PROCEDURE — 96365 THER/PROPH/DIAG IV INF INIT: CPT

## 2021-03-10 PROCEDURE — 36415 COLL VENOUS BLD VENIPUNCTURE: CPT | Performed by: EMERGENCY MEDICINE

## 2021-03-10 PROCEDURE — 96375 TX/PRO/DX INJ NEW DRUG ADDON: CPT

## 2021-03-10 PROCEDURE — 99285 EMERGENCY DEPT VISIT HI MDM: CPT | Performed by: EMERGENCY MEDICINE

## 2021-03-10 PROCEDURE — 99220 PR INITIAL OBSERVATION CARE/DAY 70 MINUTES: CPT | Performed by: PHYSICIAN ASSISTANT

## 2021-03-10 PROCEDURE — 71045 X-RAY EXAM CHEST 1 VIEW: CPT

## 2021-03-10 PROCEDURE — 85025 COMPLETE CBC W/AUTO DIFF WBC: CPT | Performed by: EMERGENCY MEDICINE

## 2021-03-10 PROCEDURE — 83735 ASSAY OF MAGNESIUM: CPT | Performed by: EMERGENCY MEDICINE

## 2021-03-10 PROCEDURE — 80053 COMPREHEN METABOLIC PANEL: CPT | Performed by: EMERGENCY MEDICINE

## 2021-03-10 PROCEDURE — 93005 ELECTROCARDIOGRAM TRACING: CPT

## 2021-03-10 PROCEDURE — 83880 ASSAY OF NATRIURETIC PEPTIDE: CPT | Performed by: EMERGENCY MEDICINE

## 2021-03-10 PROCEDURE — 84443 ASSAY THYROID STIM HORMONE: CPT | Performed by: EMERGENCY MEDICINE

## 2021-03-10 RX ORDER — FUROSEMIDE 20 MG/1
20 TABLET ORAL DAILY
Status: DISCONTINUED | OUTPATIENT
Start: 2021-03-11 | End: 2021-03-11 | Stop reason: HOSPADM

## 2021-03-10 RX ORDER — PANTOPRAZOLE SODIUM 40 MG/1
40 TABLET, DELAYED RELEASE ORAL
Status: DISCONTINUED | OUTPATIENT
Start: 2021-03-11 | End: 2021-03-11 | Stop reason: HOSPADM

## 2021-03-10 RX ORDER — LISINOPRIL 20 MG/1
20 TABLET ORAL DAILY
Status: DISCONTINUED | OUTPATIENT
Start: 2021-03-11 | End: 2021-03-11 | Stop reason: HOSPADM

## 2021-03-10 RX ORDER — DILTIAZEM HYDROCHLORIDE 5 MG/ML
20 INJECTION INTRAVENOUS ONCE
Status: COMPLETED | OUTPATIENT
Start: 2021-03-10 | End: 2021-03-10

## 2021-03-10 RX ORDER — PANTOPRAZOLE SODIUM 40 MG/1
40 TABLET, DELAYED RELEASE ORAL
Status: DISCONTINUED | OUTPATIENT
Start: 2021-03-11 | End: 2021-03-10 | Stop reason: SDUPTHER

## 2021-03-10 RX ORDER — DILTIAZEM HYDROCHLORIDE 5 MG/ML
15 INJECTION INTRAVENOUS ONCE
Status: COMPLETED | OUTPATIENT
Start: 2021-03-10 | End: 2021-03-10

## 2021-03-10 RX ORDER — ACETAMINOPHEN 325 MG/1
650 TABLET ORAL EVERY 6 HOURS PRN
Status: DISCONTINUED | OUTPATIENT
Start: 2021-03-10 | End: 2021-03-11 | Stop reason: HOSPADM

## 2021-03-10 RX ORDER — MORPHINE SULFATE 4 MG/ML
4 INJECTION, SOLUTION INTRAMUSCULAR; INTRAVENOUS ONCE
Status: COMPLETED | OUTPATIENT
Start: 2021-03-10 | End: 2021-03-10

## 2021-03-10 RX ORDER — DILTIAZEM HYDROCHLORIDE 5 MG/ML
20 INJECTION INTRAVENOUS ONCE
Status: DISCONTINUED | OUTPATIENT
Start: 2021-03-10 | End: 2021-03-10

## 2021-03-10 RX ADMIN — RIVAROXABAN 20 MG: 20 TABLET, FILM COATED ORAL at 23:20

## 2021-03-10 RX ADMIN — DILTIAZEM HYDROCHLORIDE 15 MG: 5 INJECTION INTRAVENOUS at 20:28

## 2021-03-10 RX ADMIN — DILTIAZEM HYDROCHLORIDE 1 MG/HR: 5 INJECTION INTRAVENOUS at 20:38

## 2021-03-10 RX ADMIN — ACETAMINOPHEN 650 MG: 325 TABLET, FILM COATED ORAL at 23:20

## 2021-03-10 RX ADMIN — MORPHINE SULFATE 4 MG: 4 INJECTION INTRAVENOUS at 20:36

## 2021-03-10 RX ADMIN — VERAPAMIL HYDROCHLORIDE 120 MG: 120 TABLET, FILM COATED, EXTENDED RELEASE ORAL at 23:20

## 2021-03-11 VITALS
WEIGHT: 214.6 LBS | OXYGEN SATURATION: 94 % | HEART RATE: 61 BPM | BODY MASS INDEX: 33.61 KG/M2 | TEMPERATURE: 98 F | SYSTOLIC BLOOD PRESSURE: 133 MMHG | RESPIRATION RATE: 21 BRPM | DIASTOLIC BLOOD PRESSURE: 66 MMHG

## 2021-03-11 PROBLEM — D69.6 THROMBOCYTOPENIA (HCC): Status: ACTIVE | Noted: 2021-03-11

## 2021-03-11 LAB
ANION GAP SERPL CALCULATED.3IONS-SCNC: 9 MMOL/L (ref 4–13)
ATRIAL RATE: 156 BPM
ATRIAL RATE: 250 BPM
ATRIAL RATE: 62 BPM
BUN SERPL-MCNC: 21 MG/DL (ref 5–25)
CALCIUM SERPL-MCNC: 9.1 MG/DL (ref 8.3–10.1)
CHLORIDE SERPL-SCNC: 105 MMOL/L (ref 100–108)
CO2 SERPL-SCNC: 26 MMOL/L (ref 21–32)
CREAT SERPL-MCNC: 1.53 MG/DL (ref 0.6–1.3)
ERYTHROCYTE [DISTWIDTH] IN BLOOD BY AUTOMATED COUNT: 13.5 % (ref 11.6–15.1)
GFR SERPL CREATININE-BSD FRML MDRD: 38 ML/MIN/1.73SQ M
GLUCOSE SERPL-MCNC: 88 MG/DL (ref 65–140)
HCT VFR BLD AUTO: 37.6 % (ref 34.8–46.1)
HGB BLD-MCNC: 11.7 G/DL (ref 11.5–15.4)
MAGNESIUM SERPL-MCNC: 1.9 MG/DL (ref 1.6–2.6)
MCH RBC QN AUTO: 27 PG (ref 26.8–34.3)
MCHC RBC AUTO-ENTMCNC: 31.1 G/DL (ref 31.4–37.4)
MCV RBC AUTO: 87 FL (ref 82–98)
P AXIS: -85 DEGREES
P AXIS: 85 DEGREES
P AXIS: 91 DEGREES
PLATELET # BLD AUTO: 124 THOUSANDS/UL (ref 149–390)
PMV BLD AUTO: 11.4 FL (ref 8.9–12.7)
POTASSIUM SERPL-SCNC: 3.7 MMOL/L (ref 3.5–5.3)
PR INTERVAL: 120 MS
QRS AXIS: -49 DEGREES
QRS AXIS: -55 DEGREES
QRS AXIS: -57 DEGREES
QRSD INTERVAL: 146 MS
QRSD INTERVAL: 148 MS
QRSD INTERVAL: 166 MS
QT INTERVAL: 312 MS
QT INTERVAL: 424 MS
QT INTERVAL: 452 MS
QTC INTERVAL: 458 MS
QTC INTERVAL: 499 MS
QTC INTERVAL: 611 MS
RBC # BLD AUTO: 4.34 MILLION/UL (ref 3.81–5.12)
SODIUM SERPL-SCNC: 140 MMOL/L (ref 136–145)
T WAVE AXIS: 105 DEGREES
T WAVE AXIS: 106 DEGREES
T WAVE AXIS: 122 DEGREES
TROPONIN I SERPL-MCNC: 0.12 NG/ML
TROPONIN I SERPL-MCNC: 0.19 NG/ML
TROPONIN I SERPL-MCNC: 0.2 NG/ML
VENTRICULAR RATE: 125 BPM
VENTRICULAR RATE: 154 BPM
VENTRICULAR RATE: 62 BPM
WBC # BLD AUTO: 4.87 THOUSAND/UL (ref 4.31–10.16)

## 2021-03-11 PROCEDURE — 80048 BASIC METABOLIC PNL TOTAL CA: CPT | Performed by: INTERNAL MEDICINE

## 2021-03-11 PROCEDURE — 85027 COMPLETE CBC AUTOMATED: CPT | Performed by: INTERNAL MEDICINE

## 2021-03-11 PROCEDURE — 99217 PR OBSERVATION CARE DISCHARGE MANAGEMENT: CPT | Performed by: INTERNAL MEDICINE

## 2021-03-11 PROCEDURE — 83735 ASSAY OF MAGNESIUM: CPT | Performed by: PHYSICIAN ASSISTANT

## 2021-03-11 PROCEDURE — 93010 ELECTROCARDIOGRAM REPORT: CPT | Performed by: INTERNAL MEDICINE

## 2021-03-11 PROCEDURE — 93005 ELECTROCARDIOGRAM TRACING: CPT

## 2021-03-11 PROCEDURE — 84484 ASSAY OF TROPONIN QUANT: CPT | Performed by: PHYSICIAN ASSISTANT

## 2021-03-11 PROCEDURE — 84484 ASSAY OF TROPONIN QUANT: CPT | Performed by: INTERNAL MEDICINE

## 2021-03-11 RX ORDER — POTASSIUM CHLORIDE 20 MEQ/1
40 TABLET, EXTENDED RELEASE ORAL ONCE
Status: COMPLETED | OUTPATIENT
Start: 2021-03-11 | End: 2021-03-11

## 2021-03-11 RX ORDER — OXYCODONE HYDROCHLORIDE 5 MG/1
2.5 TABLET ORAL ONCE
Status: COMPLETED | OUTPATIENT
Start: 2021-03-11 | End: 2021-03-11

## 2021-03-11 RX ORDER — DILTIAZEM HYDROCHLORIDE 5 MG/ML
10 INJECTION INTRAVENOUS ONCE
Status: COMPLETED | OUTPATIENT
Start: 2021-03-11 | End: 2021-03-11

## 2021-03-11 RX ORDER — LISINOPRIL 20 MG/1
20 TABLET ORAL DAILY
Qty: 30 TABLET | Refills: 0 | Status: SHIPPED | OUTPATIENT
Start: 2021-03-11 | End: 2021-08-13 | Stop reason: ALTCHOICE

## 2021-03-11 RX ORDER — MAGNESIUM SULFATE 1 G/100ML
1 INJECTION INTRAVENOUS ONCE
Status: COMPLETED | OUTPATIENT
Start: 2021-03-11 | End: 2021-03-11

## 2021-03-11 RX ORDER — METOPROLOL SUCCINATE 100 MG/1
150 TABLET, EXTENDED RELEASE ORAL 2 TIMES DAILY
Qty: 90 TABLET | Refills: 0 | Status: SHIPPED | OUTPATIENT
Start: 2021-03-11 | End: 2021-06-09

## 2021-03-11 RX ADMIN — PANTOPRAZOLE SODIUM 40 MG: 40 TABLET, DELAYED RELEASE ORAL at 06:05

## 2021-03-11 RX ADMIN — POTASSIUM CHLORIDE 40 MEQ: 1500 TABLET, EXTENDED RELEASE ORAL at 08:27

## 2021-03-11 RX ADMIN — METOPROLOL SUCCINATE 150 MG: 100 TABLET, EXTENDED RELEASE ORAL at 08:27

## 2021-03-11 RX ADMIN — OXYCODONE HYDROCHLORIDE 2.5 MG: 5 TABLET ORAL at 03:33

## 2021-03-11 RX ADMIN — MAGNESIUM SULFATE HEPTAHYDRATE 1 G: 1 INJECTION, SOLUTION INTRAVENOUS at 02:31

## 2021-03-11 RX ADMIN — LISINOPRIL 20 MG: 20 TABLET ORAL at 08:26

## 2021-03-11 RX ADMIN — FUROSEMIDE 20 MG: 20 TABLET ORAL at 08:27

## 2021-03-11 RX ADMIN — ACETAMINOPHEN 650 MG: 325 TABLET, FILM COATED ORAL at 08:26

## 2021-03-11 RX ADMIN — DILTIAZEM HYDROCHLORIDE 10 MG: 5 INJECTION INTRAVENOUS at 03:33

## 2021-03-11 RX ADMIN — POTASSIUM CHLORIDE 40 MEQ: 1500 TABLET, EXTENDED RELEASE ORAL at 02:31
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
## 2021-03-11 NOTE — ASSESSMENT & PLAN NOTE
Lab Results   Component Value Date    EGFR 38 03/11/2021    EGFR 35 03/10/2021    EGFR 40 02/07/2021    CREATININE 1 53 (H) 03/11/2021    CREATININE 1 62 (H) 03/10/2021    CREATININE 1 47 (H) 02/07/2021     · Baseline creatinine 1 4-1 6    · Currently at baseline

## 2021-03-11 NOTE — ASSESSMENT & PLAN NOTE
Presented with AFib with RVR  Patient reported that she ran out of her home metoprolol for the last couple of days and had hard time get hold of her outpatient doctor for refills  She received Cardizem injections and effusion and converted back to sinus and rate has been controlled so far  · EKG with no ischemic changes  · Troponin trended to peak  · Telemetry:  Sinus rhythm and rate controlled    · Continue home anticoagulation  · Continue home metoprolol, will discharge with 30 days refill advised patient to contact her primary care doctor cardiologist asap for future refills  · He patient has follow-up with her primary cardiologist tomorrow

## 2021-03-11 NOTE — ASSESSMENT & PLAN NOTE
Wt Readings from Last 3 Encounters:   03/11/21 97 3 kg (214 lb 9 6 oz)   03/06/21 97 8 kg (215 lb 9 8 oz)   02/07/21 97 8 kg (215 lb 9 8 oz)     · Last echo September 2020 EF of 60% with no significant valvular abnormality  Grade 1 diastolic dysfunction withThere was moderate to severe assymetrical hypertrophy  Posterior wall had mild increase in thickness There was no dynamic obstruction  · Appears euvolemic  · Continue home medications    · Low-sodium diet

## 2021-03-11 NOTE — ASSESSMENT & PLAN NOTE
· Noted history of atrial fibrillation  Presents with atrial fibrillation with rapid ventricular response with heart rates in the 140s to 150s  Patient states that she was feeling palpitations at that time  · Started on Cardizem drip with spontaneous conversion  Drip was subsequently held  · Currently in sinus rhythm  · Continue home medications for now  · Cardiology evaluation  · If patient does go back into atrial fibrillation can try IV medications and if necessary, resume Cardizem drip  · Trend troponins x3  · Continue anticoagulation with Xarelto    · Defer new echocardiogram to Cardiology  · Check potassium, and magnesium, keep these greater than 4, and 2 respectively

## 2021-03-11 NOTE — DISCHARGE INSTR - AVS FIRST PAGE
Dear King Ziegler,     It was our pleasure to care for you here at [facility]  It is our hope that we were always able to exceed the expected standards for your care during your stay  You were hospitalized due to palpitations secondary to rapid AFib  You were cared for on the 4th floor by Deann Tyler MD under the service of Katt Diaz MD with the [facility] who covers for your primary care physician (PCP), Ino Jeff MD, while you were hospitalized  If you have any questions or concerns related to this hospitalization, you may contact us at [phone]  For follow up as well as any medication refills, we recommend that you follow up with your primary care physician  A registered nurse will reach out to you by phone within a few days after your discharge to answer any additional questions that you may have after going home  However, at this time we provide for you here, the most important instructions / recommendations at discharge:     · Notable Medication Adjustments -   · No adjustments made to your home medications  Will provide you with a 1 month supply of metoprolol  Please contact your primary care doctor or cardiologist for future refills  · Testing Required after Discharge -   · None  · Important follow up information -   · Follow-up with your primary care doctor within 1 week  · Follow-up with your primary cardiologist as scheduled tomorrow  · Other Instructions -   · Refer to discharge instructions for further details  · Please review this entire after visit summary as additional general instructions including medication list, appointments, activity, diet, any pertinent wound care, and other additional recommendations from your care team that may be provided for you        Sincerely,     eDann Tyler MD

## 2021-03-11 NOTE — ASSESSMENT & PLAN NOTE
Wt Readings from Last 3 Encounters:   03/06/21 97 8 kg (215 lb 9 8 oz)   02/07/21 97 8 kg (215 lb 9 8 oz)   02/04/21 94 8 kg (209 lb)     · Last echo September 2020 EF of 60% with no significant valvular abnormality  Grade 1 diastolic dysfunction withThere was moderate to severe assymetrical hypertrophy  Posterior wall had mild increase in thickness There was no dynamic obstruction  · Appears euvolemic  · Continue home medications  · Daily weights  · Intake and output    · Low-sodium diet

## 2021-03-11 NOTE — ASSESSMENT & PLAN NOTE
· Present on admission with troponin 0 13    ·  Has mild chronic elevation, however this is slightly more elevated  · Likely type 2 NSTEMI in the setting of atrial fibrillation with rapid ventricular response  · Will hold on heparin drip for now  · Trend troponin x3 or to peak  · Monitor on telemetry    · Cardiology evaluation
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
## 2021-03-11 NOTE — DISCHARGE INSTRUCTIONS
A-fib (Atrial Fibrillation)   WHAT YOU NEED TO KNOW:   A-fib may come and go, or it may be a long-term condition  A-fib can cause blood clots, stroke, or heart failure  These conditions may become life-threatening  It is important to treat and manage a-fib to help prevent a blood clot, stroke, or heart failure  DISCHARGE INSTRUCTIONS:   Call your local emergency number (911 in the [address] [facility]) if:   · You have any of the following signs of a heart attack:      ? Squeezing, pressure, or pain in your chest    ? You may  also have any of the following:     ? Discomfort or pain in your back, neck, jaw, stomach, or arm    ? Shortness of breath    ? Nausea or vomiting    ? Lightheadedness or a sudden cold sweat    · You have any of the following signs of a stroke:      ? Numbness or drooping on one side of your face     ? Weakness in an arm or leg    ? Confusion or difficulty speaking    ? Dizziness, a severe headache, or vision loss    Call your cardiologist if:   · Your arm or leg feels warm, tender, and painful  It may look swollen and red  · Your heart rate is more than 110 beats per minute  · You have new or worsening swelling in your legs, feet, ankles, or abdomen  · You are short of breath, even at rest      · You have questions or concerns about your condition or care  Medicines: You may need any of the following:  · Heart medicines  help control your heart rate or rhythm  You may need more than one medicine to treat your symptoms  · Blood thinners  help prevent blood clots  Clots can cause strokes, heart attacks, and death  The following are general safety guidelines to follow while you are taking a blood thinner:    ? Watch for bleeding and bruising while you take blood thinners  Watch for bleeding from your gums or nose  Watch for blood in your urine and bowel movements  Use a soft washcloth on your skin, and a soft toothbrush to brush your teeth  This can keep your skin and gums from bleeding   If you shave, use an electric shaver  Do not play contact sports  ? Tell your dentist and other healthcare providers that you take a blood thinner  Wear a bracelet or necklace that says you take this medicine  ? Do not start or stop any other medicines unless your healthcare provider tells you to  Many medicines cannot be used with blood thinners  ? Take your blood thinner exactly as prescribed by your healthcare provider  Do not skip does or take less than prescribed  Tell your provider right away if you forget to take your blood thinner, or if you take too much  ? Warfarin  is a blood thinner that you may need to take  The following are things you should be aware of if you take warfarin:     § Foods and medicines can affect the amount of warfarin in your blood  Do not make major changes to your diet while you take warfarin  Warfarin works best when you eat about the same amount of vitamin K every day  Vitamin K is found in green leafy vegetables and certain other foods  Ask for more information about what to eat when you are taking warfarin  § You will need to see your healthcare provider for follow-up visits when you are on warfarin  You will need regular blood tests  These tests are used to decide how much medicine you need  · Antiplatelets , such as aspirin, help prevent blood clots  Take your antiplatelet medicine exactly as directed  These medicines make it more likely for you to bleed or bruise  If you are told to take aspirin, do not take acetaminophen or ibuprofen instead  · Take your medicine as directed  Contact your healthcare provider if you think your medicine is not helping or if you have side effects  Tell him or her if you are allergic to any medicine  Keep a list of the medicines, vitamins, and herbs you take  Include the amounts, and when and why you take them  Bring the list or the pill bottles to follow-up visits   Carry your medicine list with you in case of an emergency  Manage A-fib:   · Know your target heart rate  Learn how to check your pulse and monitor your heart rate  · Know the risks if you choose to drink alcohol  Alcohol can make a-fib hard to manage  Ask your healthcare provider if it is safe for you to drink alcohol  A drink of alcohol is 12 ounces of beer, 5 ounces of wine, or 1½ ounces of liquor  · Do not smoke  Nicotine can cause heart damage and make it more difficult to manage your a-fib  Do not use e-cigarettes or smokeless tobacco in place of cigarettes or to help you quit  They still contain nicotine  Ask your healthcare provider for information if you currently smoke and need help quitting  · Eat heart-healthy foods  Heart healthy foods will help keep your cholesterol low  These include fruits, vegetables, whole-grain breads, low-fat dairy products, beans, lean meats, and fish  Replace butter and margarine with heart-healthy oils such as olive oil and canola oil  · Maintain a healthy weight  Ask your healthcare provider how much you should weigh  Ask him or her to help you create a safe weight loss plan if you are overweight  Even a small goal of a 10% weight loss can improve your heart health  · Get regular physical activity  Physical activity helps improve your heart health  Get at least 150 minutes of moderate aerobic physical activity each week  Your healthcare provider can help you create an activity plan  · Manage other health conditions  This includes high blood pressure or cholesterol, sleep apnea, diabetes, and other heart conditions  Take medicine as directed and follow your treatment plan  Follow up with your cardiologist as directed: You will need regular blood tests and monitoring  Write down your questions so you remember to ask them during your visits     © Copyright [address] Information is for End User's use only and may not be sold, redistributed or otherwise used for commercial purposes  All illustrations and images included in CareNotes® are the copyrighted property of [employer] M [employer] Inc  or [employer]  The above information is an  only  It is not intended as medical advice for individual conditions or treatments  Talk to your doctor, nurse or pharmacist before following any medical regimen to see if it is safe and effective for you

## 2021-03-11 NOTE — PLAN OF CARE
Problem: Potential for Falls  Goal: Patient will remain free of falls  Description: INTERVENTIONS:  - Assess patient frequently for physical needs  -  Identify cognitive and physical deficits and behaviors that affect risk of falls    -  [place] fall precautions as indicated by assessment   - Educate patient/family on patient safety including physical limitations  - Instruct patient to call for assistance with activity based on assessment  - Modify environment to reduce risk of injury  - Consider OT/PT consult to assist with strengthening/mobility  3/11/2021 1118 by Cris Saunders RN  Outcome: Adequate for Discharge  3/11/2021 0839 by Cris Saunders RN  Outcome: Progressing     Problem: CARDIOVASCULAR - ADULT  Goal: Maintains optimal cardiac output and hemodynamic stability  Description: INTERVENTIONS:  - Monitor I/O, vital signs and rhythm  - Monitor for S/S and trends of decreased cardiac output  - Administer and titrate ordered vasoactive medications to optimize hemodynamic stability  - Assess quality of pulses, skin color and temperature  - Assess for signs of decreased coronary artery perfusion  - Instruct patient to report change in severity of symptoms  3/11/2021 1118 by Cris Saunders RN  Outcome: Adequate for Discharge  3/11/2021 0839 by Cris Saunders RN  Outcome: Progressing  Goal: Absence of cardiac dysrhythmias or at baseline rhythm  Description: INTERVENTIONS:  - Continuous cardiac monitoring, vital signs, obtain 12 lead EKG if ordered  - Administer antiarrhythmic and heart rate control medications as ordered  - Monitor electrolytes and administer replacement therapy as ordered  3/11/2021 1118 by Cris Saunders RN  Outcome: Adequate for Discharge  3/11/2021 0839 by Cris Saunders RN  Outcome: Progressing

## 2021-03-11 NOTE — ASSESSMENT & PLAN NOTE
Chronic  Currently stable with no indication for transfusion  Patient had prior CT abdomen with hepatomegaly  Likely secondary to CHRISTIAN/possible cirrhosis  Advised patient to follow-up with her primary care doctor for liver ultrasound and to continue monitoring her platelets as she is on anticoagulation for AFib

## 2021-03-11 NOTE — DISCHARGE SUMMARY
[facility]  Discharge- Namita Vila 1965, 64 y o  female MRN: [id number]  Unit/Bed#: S -01 Encounter: [id number]  Primary Care Provider: Karri Andino MD   Date and time admitted to hospital: 3/10/2021  7:57 PM    * Atrial fibrillation with RVR [facility])  Assessment & Plan  Presented with AFib with RVR  Patient reported that she ran out of her home metoprolol for the last couple of days and had hard time get hold of her outpatient doctor for refills  She received Cardizem injections and effusion and converted back to sinus and rate has been controlled so far  · EKG with no ischemic changes  · Troponin trended to peak  · Telemetry:  Sinus rhythm and rate controlled  · Continue home anticoagulation  · Continue home metoprolol, will discharge with 30 days refill advised patient to contact her primary care doctor cardiologist asap for future refills  · He patient has follow-up with her primary cardiologist tomorrow    Thrombocytopenia [facility])  Assessment & Plan  Chronic  Currently stable with no indication for transfusion  Patient had prior CT abdomen with hepatomegaly  Likely secondary to CHRISTIAN/possible cirrhosis  Advised patient to follow-up with her primary care doctor for liver ultrasound and to continue monitoring her platelets as she is on anticoagulation for AFib  Chronic diastolic CHF (congestive heart failure) (HCC)  Assessment & Plan  Wt Readings from Last 3 Encounters:   03/11/21 97 3 kg (214 lb 9 6 oz)   03/06/21 97 8 kg (215 lb 9 8 oz)   02/07/21 97 8 kg (215 lb 9 8 oz)     · Last echo September 2020 EF of 60% with no significant valvular abnormality  Grade 1 diastolic dysfunction withThere was moderate to severe assymetrical hypertrophy  Posterior wall had mild increase in thickness There was no dynamic obstruction  · Appears euvolemic  · Continue home medications    · Low-sodium diet      Stage 3 chronic kidney disease  Assessment & Plan  Lab Results Component Value Date    EGFR 38 03/11/2021    EGFR 35 03/10/2021    EGFR 40 02/07/2021    CREATININE 1 53 (H) 03/11/2021    CREATININE 1 62 (H) 03/10/2021    CREATININE 1 47 (H) 02/07/2021     · Baseline creatinine 1 4-1 6  · Currently at baseline    Elevated troponin  Assessment & Plan  · Trended to peak  Likely secondary to AFib with RVR/hypertension  · No further workup necessary    Uncontrolled hypertension  Assessment & Plan  Blood pressure currently stable within acceptable range  Continue home meds        Discharging Resident Physician: Deann Tyler MD  Attending: Katt Diaz MD  PCP: Ino Jeff MD  Admission Date: 3/10/2021  Discharge Date: 03/11/21    Disposition:     Home    Reason for Admission:  Palpitation secondary to AFib with RVR    Consultations During Hospital Stay:  · None    Procedures Performed:     · None    Significant Findings / Test Results:     · Elevated Troponin, trended to peak  Likely secondary to AFib with RVR/hypertension  · EKG:  AFib with RVR -> converted to sinus and rate controlled  · Thrombocytopenia, chronic  PCP follow-up    Incidental Findings:   · None     Test Results Pending at Discharge (will require follow up): · None     Outpatient Tests Requested:  · None    Complications:  None    Hospital Course:     King Ziegler is a 64 y o  female patient who originally presented to the hospital on 3/10/2021 due to palpitations secondary to AFib with RVR noted on EKG  Patient received multiple doses of Cardizem injections and then started on Cardizem drip and then she was converted to sinus with controlled rate  Patient stated that she ran out of her metoprolol and she had trouble getting a hold of her primary care doctor for refills  She also noticed to have elevated blood pressure and elevated troponin  Blood pressure was controlled and currently at acceptable range    Troponin was trended to peak and no further workup is necessary as is most likely secondary to hypertension/AFib with RVR rather than cardiac  Therefore patient is medically stable for discharge as she denies any complaints and she is in sinus with normal heart rate  Patient has a follow-up scheduled with cardiology tomorrow  Will discharge with 1 month supply of metoprolol and patient was advised to follow-up with her primary care doctor/primary cardiologist for future refills  Patient expressed understanding  Condition at Discharge: stable     Discharge Day Visit / Exam:     Subjective:    Vitals: Blood Pressure: 133/66 (03/11/21 0806)  Pulse: 61 (03/11/21 0806)  Temperature: 98 °F (36 7 °C) (03/11/21 0806)  Temp Source: Oral (03/11/21 0806)  Respirations: 21 (03/11/21 0806)  Weight - Scale: 97 3 kg (214 lb 9 6 oz) (03/11/21 0600)  SpO2: 94 % (03/11/21 0806)  Exam:   Physical Exam  Vitals signs and nursing note reviewed  Constitutional:       General: She is not in acute distress  Appearance: She is not ill-appearing or diaphoretic  HENT:      Head: Normocephalic and atraumatic  Mouth/Throat:      Mouth: Mucous membranes are moist       Pharynx: Oropharynx is clear  Eyes:      General: No scleral icterus  Extraocular Movements: Extraocular movements intact  Conjunctiva/sclera: Conjunctivae normal    Neck:      Musculoskeletal: Normal range of motion and neck supple  Cardiovascular:      Rate and Rhythm: Normal rate and regular rhythm  Heart sounds: Normal heart sounds  No murmur  Pulmonary:      Effort: Pulmonary effort is normal  No respiratory distress  Breath sounds: Normal breath sounds  No wheezing or rales  Abdominal:      General: Bowel sounds are normal       Palpations: Abdomen is soft  Tenderness: There is no abdominal tenderness  Musculoskeletal:      Right lower leg: No edema  Left lower leg: No edema  Skin:     General: Skin is warm and dry  Capillary Refill: Capillary refill takes less than 2 seconds  Neurological:      General: No focal deficit present  Mental Status: She is alert and oriented to person, place, and time  Psychiatric:         Mood and Affect: Mood normal          Behavior: Behavior normal            Discharge instructions/Information to patient and family:   See after visit summary for information provided to patient and family  Provisions for Follow-Up Care:  See after visit summary for information related to follow-up care and any pertinent home health orders  Planned Readmission:  No     Discharge Medications:  See after visit summary for reconciled discharge medications provided to patient and family        ** Please Note: This note has been constructed using a voice recognition system **

## 2021-03-11 NOTE — ASSESSMENT & PLAN NOTE
Lab Results   Component Value Date    EGFR 35 03/10/2021    EGFR 40 02/07/2021    EGFR 38 02/04/2021    CREATININE 1 62 (H) 03/10/2021    CREATININE 1 47 (H) 02/07/2021    CREATININE 1 52 (H) 02/04/2021     · Baseline creatinine 0 4-0 6    · she is at the upper end of her baseline  · Monitor renal function

## 2021-03-11 NOTE — PLAN OF CARE
Problem: Potential for Falls  Goal: Patient will remain free of falls  Description: INTERVENTIONS:  - Assess patient frequently for physical needs  -  Identify cognitive and physical deficits and behaviors that affect risk of falls    -  Alto fall precautions as indicated by assessment   - Educate patient/family on patient safety including physical limitations  - Instruct patient to call for assistance with activity based on assessment  - Modify environment to reduce risk of injury  - Consider OT/PT consult to assist with strengthening/mobility  Outcome: Progressing     Problem: CARDIOVASCULAR - ADULT  Goal: Maintains optimal cardiac output and hemodynamic stability  Description: INTERVENTIONS:  - Monitor I/O, vital signs and rhythm  - Monitor for S/S and trends of decreased cardiac output  - Administer and titrate ordered vasoactive medications to optimize hemodynamic stability  - Assess quality of pulses, skin color and temperature  - Assess for signs of decreased coronary artery perfusion  - Instruct patient to report change in severity of symptoms  Outcome: Progressing  Goal: Absence of cardiac dysrhythmias or at baseline rhythm  Description: INTERVENTIONS:  - Continuous cardiac monitoring, vital signs, obtain 12 lead EKG if ordered  - Administer antiarrhythmic and heart rate control medications as ordered  - Monitor electrolytes and administer replacement therapy as ordered  Outcome: Progressing

## 2021-03-11 NOTE — PLAN OF CARE
Problem: Potential for Falls  Goal: Patient will remain free of falls  Description: INTERVENTIONS:  - Assess patient frequently for physical needs  -  Identify cognitive and physical deficits and behaviors that affect risk of falls    -  Hardesty fall precautions as indicated by assessment   - Educate patient/family on patient safety including physical limitations  - Instruct patient to call for assistance with activity based on assessment  - Modify environment to reduce risk of injury  - Consider OT/PT consult to assist with strengthening/mobility  Outcome: Progressing     Problem: CARDIOVASCULAR - ADULT  Goal: Maintains optimal cardiac output and hemodynamic stability  Description: INTERVENTIONS:  - Monitor I/O, vital signs and rhythm  - Monitor for S/S and trends of decreased cardiac output  - Administer and titrate ordered vasoactive medications to optimize hemodynamic stability  - Assess quality of pulses, skin color and temperature  - Assess for signs of decreased coronary artery perfusion  - Instruct patient to report change in severity of symptoms  Outcome: Progressing  Goal: Absence of cardiac dysrhythmias or at baseline rhythm  Description: INTERVENTIONS:  - Continuous cardiac monitoring, vital signs, obtain 12 lead EKG if ordered  - Administer antiarrhythmic and heart rate control medications as ordered  - Monitor electrolytes and administer replacement therapy as ordered  Outcome: Progressing

## 2021-03-11 NOTE — ASSESSMENT & PLAN NOTE
· Blood pressure initially elevated at 326 systolic  · Looks to be normalizing  · Continue home medications for now  · Monitor blood pressure

## 2021-03-12 ENCOUNTER — TELEPHONE (OUTPATIENT)
Dept: NON INVASIVE DIAGNOSTICS | Facility: HOSPITAL | Age: 56
End: 2021-03-12

## 2021-03-12 NOTE — TELEPHONE ENCOUNTER
Called patient and LM regarding rescheduling her appointment scheduled on 03/15/21  Explained that due to a scheduling conflict we are looking to reschedule her appointment to 03/22/21  Instructed patient to return my call but if she calls after 1600 to call central scheduling

## 2021-03-19 ENCOUNTER — TELEPHONE (OUTPATIENT)
Dept: NEPHROLOGY | Facility: CLINIC | Age: 56
End: 2021-03-19

## 2021-03-19 NOTE — TELEPHONE ENCOUNTER
A message was left requesting a call back to reschedule F/U with provider  Patient has NO SHOWED 3 times

## 2021-03-22 ENCOUNTER — TELEPHONE (OUTPATIENT)
Dept: NON INVASIVE DIAGNOSTICS | Facility: HOSPITAL | Age: 56
End: 2021-03-22

## 2021-03-25 ENCOUNTER — HOSPITAL ENCOUNTER (OUTPATIENT)
Dept: NON INVASIVE DIAGNOSTICS | Facility: HOSPITAL | Age: 56
Discharge: HOME | End: 2021-03-25

## 2021-03-25 DIAGNOSIS — I24.9 ACS (ACUTE CORONARY SYNDROME) (HCC): ICD-10-CM

## 2021-03-29 RX ORDER — FAMOTIDINE 20 MG/1
TABLET, FILM COATED ORAL
Qty: 2 TABLET | OUTPATIENT
Start: 2021-03-29

## 2021-03-30 ENCOUNTER — APPOINTMENT (EMERGENCY)
Dept: RADIOLOGY | Facility: HOSPITAL | Age: 56
End: 2021-03-30
Payer: COMMERCIAL

## 2021-03-30 ENCOUNTER — TELEPHONE (OUTPATIENT)
Dept: NON INVASIVE DIAGNOSTICS | Facility: HOSPITAL | Age: 56
End: 2021-03-30

## 2021-03-30 ENCOUNTER — HOSPITAL ENCOUNTER (EMERGENCY)
Facility: HOSPITAL | Age: 56
Discharge: HOME | End: 2021-03-30
Attending: EMERGENCY MEDICINE
Payer: COMMERCIAL

## 2021-03-30 VITALS
RESPIRATION RATE: 16 BRPM | HEART RATE: 61 BPM | OXYGEN SATURATION: 99 % | SYSTOLIC BLOOD PRESSURE: 195 MMHG | TEMPERATURE: 98 F | DIASTOLIC BLOOD PRESSURE: 88 MMHG

## 2021-03-30 DIAGNOSIS — M54.30 SCIATICA: Primary | ICD-10-CM

## 2021-03-30 DIAGNOSIS — M25.562 LEFT KNEE PAIN: ICD-10-CM

## 2021-03-30 DIAGNOSIS — M25.552 LEFT HIP PAIN: ICD-10-CM

## 2021-03-30 PROCEDURE — 99283 EMERGENCY DEPT VISIT LOW MDM: CPT | Performed by: PHYSICIAN ASSISTANT

## 2021-03-30 PROCEDURE — 73564 X-RAY EXAM KNEE 4 OR MORE: CPT

## 2021-03-30 PROCEDURE — 72100 X-RAY EXAM L-S SPINE 2/3 VWS: CPT

## 2021-03-30 PROCEDURE — 99283 EMERGENCY DEPT VISIT LOW MDM: CPT

## 2021-03-30 PROCEDURE — 73502 X-RAY EXAM HIP UNI 2-3 VIEWS: CPT

## 2021-03-30 RX ORDER — ACETAMINOPHEN 325 MG/1
650 TABLET ORAL ONCE
Status: COMPLETED | OUTPATIENT
Start: 2021-03-30 | End: 2021-03-30

## 2021-03-30 RX ORDER — OXYCODONE HYDROCHLORIDE 5 MG/1
5 TABLET ORAL ONCE
Status: COMPLETED | OUTPATIENT
Start: 2021-03-30 | End: 2021-03-30

## 2021-03-30 RX ORDER — LIDOCAINE 50 MG/G
1 PATCH TOPICAL ONCE
Status: DISCONTINUED | OUTPATIENT
Start: 2021-03-30 | End: 2021-03-30 | Stop reason: HOSPADM

## 2021-03-30 RX ORDER — OXYCODONE HYDROCHLORIDE 5 MG/1
5 TABLET ORAL EVERY 6 HOURS PRN
Qty: 6 TABLET | Refills: 0 | Status: SHIPPED | OUTPATIENT
Start: 2021-03-30 | End: 2021-04-01

## 2021-03-30 RX ORDER — METHYLPREDNISOLONE 4 MG/1
TABLET ORAL
Qty: 21 TABLET | Refills: 0 | Status: SHIPPED | OUTPATIENT
Start: 2021-03-30 | End: 2021-05-20 | Stop reason: ALTCHOICE

## 2021-03-30 RX ADMIN — ACETAMINOPHEN 650 MG: 325 TABLET, FILM COATED ORAL at 15:51

## 2021-03-30 RX ADMIN — OXYCODONE HYDROCHLORIDE 5 MG: 5 TABLET ORAL at 15:51

## 2021-03-30 NOTE — Clinical Note
Dilip Jones was seen and treated in our emergency department on 3/30/2021  Diagnosis:     Ethel Bower  may return to work on return date  She may return on this date: 04/02/2021         If you have any questions or concerns, please don't hesitate to call        Lyn Argueta PA-C    ______________________________           _______________          _______________  Hospital Representative                              Date                                Time
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
## 2021-04-28 RX ORDER — FAMOTIDINE 20 MG/1
TABLET, FILM COATED ORAL
Qty: 2 TABLET | OUTPATIENT
Start: 2021-04-28

## 2021-05-15 ENCOUNTER — APPOINTMENT (EMERGENCY)
Dept: RADIOLOGY | Facility: HOSPITAL | Age: 56
End: 2021-05-15
Payer: COMMERCIAL

## 2021-05-15 ENCOUNTER — HOSPITAL ENCOUNTER (EMERGENCY)
Facility: HOSPITAL | Age: 56
Discharge: HOME | End: 2021-05-15
Attending: EMERGENCY MEDICINE | Admitting: EMERGENCY MEDICINE
Payer: COMMERCIAL

## 2021-05-15 VITALS
DIASTOLIC BLOOD PRESSURE: 98 MMHG | WEIGHT: 212 LBS | BODY MASS INDEX: 33.27 KG/M2 | SYSTOLIC BLOOD PRESSURE: 207 MMHG | TEMPERATURE: 98.9 F | HEIGHT: 67 IN | OXYGEN SATURATION: 97 % | RESPIRATION RATE: 18 BRPM | HEART RATE: 58 BPM

## 2021-05-15 DIAGNOSIS — T63.441A ALLERGIC REACTION TO BEE STING: Primary | ICD-10-CM

## 2021-05-15 DIAGNOSIS — S63.656A SPRAIN OF METACARPOPHALANGEAL (MCP) JOINT OF RIGHT LITTLE FINGER, INITIAL ENCOUNTER: ICD-10-CM

## 2021-05-15 PROCEDURE — 99283 EMERGENCY DEPT VISIT LOW MDM: CPT

## 2021-05-15 PROCEDURE — 73130 X-RAY EXAM OF HAND: CPT

## 2021-05-15 PROCEDURE — 99284 EMERGENCY DEPT VISIT MOD MDM: CPT | Performed by: EMERGENCY MEDICINE

## 2021-05-15 RX ORDER — PREDNISONE 10 MG/1
TABLET ORAL
Qty: 15 TABLET | Refills: 0 | Status: SHIPPED | OUTPATIENT
Start: 2021-05-15 | End: 2021-05-20 | Stop reason: ALTCHOICE

## 2021-05-15 RX ORDER — OXYCODONE HYDROCHLORIDE AND ACETAMINOPHEN 5; 325 MG/1; MG/1
1 TABLET ORAL ONCE
Status: COMPLETED | OUTPATIENT
Start: 2021-05-15 | End: 2021-05-15

## 2021-05-15 RX ORDER — PREDNISONE 20 MG/1
60 TABLET ORAL ONCE
Status: COMPLETED | OUTPATIENT
Start: 2021-05-15 | End: 2021-05-15

## 2021-05-15 RX ORDER — EPINEPHRINE 0.3 MG/.3ML
0.3 INJECTION SUBCUTANEOUS ONCE
Qty: 1 EACH | Refills: 0 | Status: SHIPPED | OUTPATIENT
Start: 2021-05-15

## 2021-05-15 RX ADMIN — OXYCODONE HYDROCHLORIDE AND ACETAMINOPHEN 1 TABLET: 5; 325 TABLET ORAL at 17:00

## 2021-05-15 RX ADMIN — PREDNISONE 60 MG: 20 TABLET ORAL at 17:00
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
## 2021-05-20 ENCOUNTER — HOSPITAL ENCOUNTER (EMERGENCY)
Facility: HOSPITAL | Age: 56
Discharge: HOME | End: 2021-05-20
Payer: COMMERCIAL

## 2021-05-20 VITALS
OXYGEN SATURATION: 97 % | HEART RATE: 59 BPM | RESPIRATION RATE: 18 BRPM | SYSTOLIC BLOOD PRESSURE: 195 MMHG | DIASTOLIC BLOOD PRESSURE: 95 MMHG | TEMPERATURE: 97.8 F

## 2021-05-20 DIAGNOSIS — H01.009 BLEPHARITIS: Primary | ICD-10-CM

## 2021-05-20 PROCEDURE — 99284 EMERGENCY DEPT VISIT MOD MDM: CPT

## 2021-05-20 PROCEDURE — 99283 EMERGENCY DEPT VISIT LOW MDM: CPT
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
## 2021-05-24 ENCOUNTER — APPOINTMENT (EMERGENCY)
Dept: RADIOLOGY | Facility: HOSPITAL | Age: 56
DRG: 194 | End: 2021-05-24
Payer: COMMERCIAL

## 2021-05-24 ENCOUNTER — HOSPITAL ENCOUNTER (INPATIENT)
Facility: HOSPITAL | Age: 56
LOS: 2 days | Discharge: HOME | DRG: 194 | End: 2021-05-26
Admitting: INTERNAL MEDICINE
Payer: COMMERCIAL

## 2021-05-24 DIAGNOSIS — Z72.0 NICOTINE ABUSE: ICD-10-CM

## 2021-05-24 DIAGNOSIS — I48.91 ATRIAL FIBRILLATION WITH TACHYCARDIC VENTRICULAR RATE (HCC): Primary | ICD-10-CM

## 2021-05-24 DIAGNOSIS — I48.91 ATRIAL FIBRILLATION WITH RAPID VENTRICULAR RESPONSE (HCC): ICD-10-CM

## 2021-05-24 DIAGNOSIS — I20.0 UNSTABLE ANGINA PECTORIS (HCC): ICD-10-CM

## 2021-05-24 DIAGNOSIS — B37.2 CUTANEOUS CANDIDIASIS: ICD-10-CM

## 2021-05-24 DIAGNOSIS — F17.210 CIGARETTE NICOTINE DEPENDENCE WITHOUT COMPLICATION: Chronic | ICD-10-CM

## 2021-05-24 LAB
ALBUMIN SERPL BCP-MCNC: 4.4 G/DL (ref 3.4–4.8)
ALP SERPL-CCNC: 53.2 U/L (ref 35–140)
ALT SERPL W P-5'-P-CCNC: 13 U/L (ref 5–54)
ANION GAP SERPL CALCULATED.3IONS-SCNC: 8 MMOL/L (ref 4–13)
APTT PPP: 32 SECONDS (ref 23–31)
AST SERPL W P-5'-P-CCNC: 18 U/L (ref 15–41)
BASOPHILS # BLD AUTO: 0.02 THOUSANDS/ΜL (ref 0–0.1)
BASOPHILS NFR BLD AUTO: 0 % (ref 0–1)
BILIRUB SERPL-MCNC: 0.47 MG/DL (ref 0.3–1.2)
BNP SERPL-MCNC: 2706 PG/ML (ref 1–100)
BUN SERPL-MCNC: 13 MG/DL (ref 6–20)
CALCIUM SERPL-MCNC: 9.8 MG/DL (ref 8.4–10.2)
CHLORIDE SERPL-SCNC: 106 MMOL/L (ref 96–108)
CO2 SERPL-SCNC: 29 MMOL/L (ref 22–33)
CREAT SERPL-MCNC: 1.62 MG/DL (ref 0.4–1.1)
EOSINOPHIL # BLD AUTO: 0.11 THOUSAND/ΜL (ref 0–0.61)
EOSINOPHIL NFR BLD AUTO: 1 % (ref 0–6)
ERYTHROCYTE [DISTWIDTH] IN BLOOD BY AUTOMATED COUNT: 13.6 % (ref 11.6–15.1)
GFR SERPL CREATININE-BSD FRML MDRD: 35 ML/MIN/1.73SQ M
GLUCOSE SERPL-MCNC: 134 MG/DL (ref 65–140)
HCT VFR BLD AUTO: 43.4 % (ref 34.8–46.1)
HGB BLD-MCNC: 14.1 G/DL (ref 11.5–15.4)
IMM GRANULOCYTES # BLD AUTO: 0.02 THOUSAND/UL (ref 0–0.2)
IMM GRANULOCYTES NFR BLD AUTO: 0 % (ref 0–2)
INR PPP: 1.18 (ref 0.9–1.1)
LYMPHOCYTES # BLD AUTO: 2.12 THOUSANDS/ΜL (ref 0.6–4.47)
LYMPHOCYTES NFR BLD AUTO: 26 % (ref 14–44)
MCH RBC QN AUTO: 27.9 PG (ref 26.8–34.3)
MCHC RBC AUTO-ENTMCNC: 32.5 G/DL (ref 31.4–37.4)
MCV RBC AUTO: 86 FL (ref 82–98)
MONOCYTES # BLD AUTO: 0.54 THOUSAND/ΜL (ref 0.17–1.22)
MONOCYTES NFR BLD AUTO: 7 % (ref 4–12)
NEUTROPHILS # BLD AUTO: 5.44 THOUSANDS/ΜL (ref 1.85–7.62)
NEUTS SEG NFR BLD AUTO: 66 % (ref 43–75)
PLATELET # BLD AUTO: 195 THOUSANDS/UL (ref 149–390)
PMV BLD AUTO: 11.4 FL (ref 8.9–12.7)
POTASSIUM SERPL-SCNC: 3.8 MMOL/L (ref 3.5–5)
PROT SERPL-MCNC: 7.8 G/DL (ref 6.4–8.3)
PROTHROMBIN TIME: 13.2 SECONDS (ref 9.5–12.1)
RBC # BLD AUTO: 5.06 MILLION/UL (ref 3.81–5.12)
SODIUM SERPL-SCNC: 143 MMOL/L (ref 133–145)
TROPONIN I SERPL-MCNC: 0.2 NG/ML (ref 0–0.07)
TROPONIN I SERPL-MCNC: 0.24 NG/ML (ref 0–0.07)
TROPONIN I SERPL-MCNC: 0.24 NG/ML (ref 0–0.07)
WBC # BLD AUTO: 8.25 THOUSAND/UL (ref 4.31–10.16)

## 2021-05-24 PROCEDURE — 83880 ASSAY OF NATRIURETIC PEPTIDE: CPT

## 2021-05-24 PROCEDURE — 99285 EMERGENCY DEPT VISIT HI MDM: CPT

## 2021-05-24 PROCEDURE — 85730 THROMBOPLASTIN TIME PARTIAL: CPT

## 2021-05-24 PROCEDURE — 85025 COMPLETE CBC W/AUTO DIFF WBC: CPT

## 2021-05-24 PROCEDURE — 36415 COLL VENOUS BLD VENIPUNCTURE: CPT

## 2021-05-24 PROCEDURE — 96375 TX/PRO/DX INJ NEW DRUG ADDON: CPT

## 2021-05-24 PROCEDURE — 71045 X-RAY EXAM CHEST 1 VIEW: CPT

## 2021-05-24 PROCEDURE — 85610 PROTHROMBIN TIME: CPT

## 2021-05-24 PROCEDURE — 84484 ASSAY OF TROPONIN QUANT: CPT | Performed by: INTERNAL MEDICINE

## 2021-05-24 PROCEDURE — 84484 ASSAY OF TROPONIN QUANT: CPT

## 2021-05-24 PROCEDURE — 99222 1ST HOSP IP/OBS MODERATE 55: CPT | Performed by: INTERNAL MEDICINE

## 2021-05-24 PROCEDURE — 80053 COMPREHEN METABOLIC PANEL: CPT

## 2021-05-24 PROCEDURE — 93005 ELECTROCARDIOGRAM TRACING: CPT

## 2021-05-24 PROCEDURE — 96374 THER/PROPH/DIAG INJ IV PUSH: CPT

## 2021-05-24 RX ORDER — FUROSEMIDE 20 MG/1
20 TABLET ORAL DAILY
Status: DISCONTINUED | OUTPATIENT
Start: 2021-05-25 | End: 2021-05-26 | Stop reason: HOSPADM

## 2021-05-24 RX ORDER — METOPROLOL TARTRATE 5 MG/5ML
5 INJECTION INTRAVENOUS ONCE
Status: COMPLETED | OUTPATIENT
Start: 2021-05-24 | End: 2021-05-24

## 2021-05-24 RX ORDER — ONDANSETRON 2 MG/ML
4 INJECTION INTRAMUSCULAR; INTRAVENOUS ONCE
Status: COMPLETED | OUTPATIENT
Start: 2021-05-24 | End: 2021-05-24

## 2021-05-24 RX ORDER — LISINOPRIL 20 MG/1
20 TABLET ORAL DAILY
Status: DISCONTINUED | OUTPATIENT
Start: 2021-05-25 | End: 2021-05-26 | Stop reason: HOSPADM

## 2021-05-24 RX ORDER — METOPROLOL TARTRATE 5 MG/5ML
5 INJECTION INTRAVENOUS ONCE
Status: DISCONTINUED | OUTPATIENT
Start: 2021-05-24 | End: 2021-05-24

## 2021-05-24 RX ORDER — METOPROLOL TARTRATE 5 MG/5ML
5 INJECTION INTRAVENOUS ONCE
Status: DISCONTINUED | OUTPATIENT
Start: 2021-05-24 | End: 2021-05-25

## 2021-05-24 RX ORDER — PANTOPRAZOLE SODIUM 40 MG/1
40 TABLET, DELAYED RELEASE ORAL
Status: DISCONTINUED | OUTPATIENT
Start: 2021-05-25 | End: 2021-05-26 | Stop reason: HOSPADM

## 2021-05-24 RX ORDER — FUROSEMIDE 10 MG/ML
40 INJECTION INTRAMUSCULAR; INTRAVENOUS ONCE
Status: COMPLETED | OUTPATIENT
Start: 2021-05-24 | End: 2021-05-24

## 2021-05-24 RX ORDER — METOPROLOL TARTRATE 5 MG/5ML
5 INJECTION INTRAVENOUS EVERY 6 HOURS PRN
Status: DISCONTINUED | OUTPATIENT
Start: 2021-05-24 | End: 2021-05-25

## 2021-05-24 RX ORDER — ACETAMINOPHEN 325 MG/1
650 TABLET ORAL EVERY 6 HOURS PRN
Status: DISCONTINUED | OUTPATIENT
Start: 2021-05-24 | End: 2021-05-26 | Stop reason: HOSPADM

## 2021-05-24 RX ADMIN — FUROSEMIDE 40 MG: 10 INJECTION, SOLUTION INTRAMUSCULAR; INTRAVENOUS at 16:15

## 2021-05-24 RX ADMIN — METOPROLOL TARTRATE 5 MG: 5 INJECTION INTRAVENOUS at 14:52

## 2021-05-24 RX ADMIN — METOPROLOL TARTRATE 75 MG: 50 TABLET, FILM COATED ORAL at 19:24

## 2021-05-24 RX ADMIN — METOROPROLOL TARTRATE 5 MG: 5 INJECTION, SOLUTION INTRAVENOUS at 14:27

## 2021-05-24 RX ADMIN — ACETAMINOPHEN 650 MG: 325 TABLET, FILM COATED ORAL at 19:48

## 2021-05-24 RX ADMIN — MORPHINE SULFATE 2 MG: 2 INJECTION, SOLUTION INTRAMUSCULAR; INTRAVENOUS at 15:04

## 2021-05-24 RX ADMIN — METOROPROLOL TARTRATE 5 MG: 5 INJECTION, SOLUTION INTRAVENOUS at 14:40

## 2021-05-24 RX ADMIN — METOPROLOL TARTRATE 75 MG: 50 TABLET, FILM COATED ORAL at 22:14

## 2021-05-24 RX ADMIN — DILTIAZEM HYDROCHLORIDE 15 MG/HR: 5 INJECTION INTRAVENOUS at 16:15

## 2021-05-24 RX ADMIN — ONDANSETRON 4 MG: 2 INJECTION INTRAMUSCULAR; INTRAVENOUS at 15:04

## 2021-05-24 RX ADMIN — MORPHINE SULFATE 2 MG: 2 INJECTION, SOLUTION INTRAMUSCULAR; INTRAVENOUS at 22:30
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
## 2021-05-24 NOTE — ASSESSMENT & PLAN NOTE
History of atrial fibrillation/flutter   Patient is on metoprolol tartrate 150 mg b i d  and verapamil 120 mg daily  Came in with heart rate 150 beats per minute  She received Lopressor 5 mg IV 3 times   She initially confirmed today to sinus rhythm at 2:40 p m  Then went back to Afib  She was then given Cardizem 125 mg at 4:25 p m    Heart rate is currently better controlled at 76 bpm  - monitor on telemetry  - continue metoprolol tartrate 50 mg q 6 hours  - Lopressor 5 mg IV p r n  for heart rate more than 120  - continue home verapamil 120 mg daily

## 2021-05-24 NOTE — ASSESSMENT & PLAN NOTE
NSTEMI type 2 vs NSTEMI type 1 vs non MI troponin elevation  Patient presents with chest pain that started this morning around 7:00 a m  Pressure-like mental chest exacerbated by physical activity, associated by shortness of breath, weakness, 7/10  The pain was constant  Patient did not try anything for the pain  She states she did not have similar pain any time in the past   Patient is a smoker, with hypertension, no personal history of MI  Cardiac catheterization in 1/2019 without abnormalities  Last echo 09/18/2020 with EF 60% no regional wall motion abnormalities, significantly LV hypertrophy, grade 1 diastolic dysfunction  She follows with cardiologist Dr Dung Vargas  On admission with heart rate of 150 bpm, blood pressure 168/111 mm Hg  EKG was showing AFib with RVR, conduction abnormalities  Lipid panel from 02/21 within normal limits  She is not on statin    - trend troponin every 3 hours total times 3    Will consider heparin drip if troponin trends up  - give morphine for pain  - monitor on telemetry  - monitor vital signs  - cardiology consult

## 2021-05-24 NOTE — ASSESSMENT & PLAN NOTE
Lab Results   Component Value Date    EGFR 35 05/24/2021    EGFR 38 03/11/2021    EGFR 35 03/10/2021    CREATININE 1 62 (H) 05/24/2021    CREATININE 1 53 (H) 03/11/2021    CREATININE 1 62 (H) 03/10/2021   Baseline creatinine 1 5- 1 7  - monitor BMP in the morning

## 2021-05-24 NOTE — PLAN OF CARE
Problem: Potential for Falls  Goal: Patient will remain free of falls  Description: INTERVENTIONS:  - Assess patient frequently for physical needs  -  Identify cognitive and physical deficits and behaviors that affect risk of falls    -  [place] fall precautions as indicated by assessment   - Educate patient/family on patient safety including physical limitations  - Instruct patient to call for assistance with activity based on assessment  - Modify environment to reduce risk of injury  - Consider OT/PT consult to assist with strengthening/mobility  Outcome: Progressing     Problem: PAIN - ADULT  Goal: Verbalizes/displays adequate comfort level or baseline comfort level  Description: Interventions:  - Encourage patient to monitor pain and request assistance  - Assess pain using appropriate pain scale  - Administer analgesics based on type and severity of pain and evaluate response  - Implement non-pharmacological measures as appropriate and evaluate response  - Consider cultural and social influences on pain and pain management  - Notify physician/advanced practitioner if interventions unsuccessful or patient reports new pain  Outcome: Progressing     Problem: INFECTION - ADULT  Goal: Absence or prevention of progression during hospitalization  Description: INTERVENTIONS:  - Assess and monitor for signs and symptoms of infection  - Monitor lab/diagnostic results  - Monitor all insertion sites, i e  indwelling lines, tubes, and drains  - Monitor endotracheal if appropriate and nasal secretions for changes in amount and color  - [place] appropriate cooling/warming therapies per order  - Administer medications as ordered  - Instruct and encourage patient and family to use good hand hygiene technique  - Identify and instruct in appropriate isolation precautions for identified infection/condition  Outcome: Progressing  Goal: Absence of fever/infection during neutropenic period  Description: INTERVENTIONS:  - Monitor WBC    Outcome: Progressing     Problem: SAFETY ADULT  Goal: Patient will remain free of falls  Description: INTERVENTIONS:  - Assess patient frequently for physical needs  -  Identify cognitive and physical deficits and behaviors that affect risk of falls    -  [place] fall precautions as indicated by assessment   - Educate patient/family on patient safety including physical limitations  - Instruct patient to call for assistance with activity based on assessment  - Modify environment to reduce risk of injury  - Consider OT/PT consult to assist with strengthening/mobility  Outcome: Progressing  Goal: Maintain or return to baseline ADL function  Description: INTERVENTIONS:  -  Assess patient's ability to carry out ADLs; assess patient's baseline for ADL function and identify physical deficits which impact ability to perform ADLs (bathing, care of mouth/teeth, toileting, grooming, dressing, etc )  - Assess/evaluate cause of self-care deficits   - Assess range of motion  - Assess patient's mobility; develop plan if impaired  - Assess patient's need for assistive devices and provide as appropriate  - Encourage maximum independence but intervene and supervise when necessary  - Involve family in performance of ADLs  - Assess for home care needs following discharge   - Consider OT consult to assist with ADL evaluation and planning for discharge  - Provide patient education as appropriate  Outcome: Progressing  Goal: Maintain or return mobility status to optimal level  Description: INTERVENTIONS:  - Assess patient's baseline mobility status (ambulation, transfers, stairs, etc )    - Identify cognitive and physical deficits and behaviors that affect mobility  - Identify mobility aids required to assist with transfers and/or ambulation (gait belt, sit-to-stand, lift, walker, cane, etc )  - [place] fall precautions as indicated by assessment  - Record patient progress and toleration of activity level on Mobility SBAR; progress patient to next Phase/Stage  - Instruct patient to call for assistance with activity based on assessment  - Consider rehabilitation consult to assist with strengthening/weightbearing, etc   Outcome: Progressing     Problem: DISCHARGE PLANNING  Goal: Discharge to home or other facility with appropriate resources  Description: INTERVENTIONS:  - Identify barriers to discharge w/patient and caregiver  - Arrange for needed discharge resources and transportation as appropriate  - Identify discharge learning needs (meds, wound care, etc )  - Arrange for interpretive services to assist at discharge as needed  - Refer to Case Management Department for coordinating discharge planning if the patient needs post-hospital services based on physician/advanced practitioner order or complex needs related to functional status, cognitive ability, or social support system  Outcome: Progressing     Problem: Knowledge Deficit  Goal: Patient/family/caregiver demonstrates understanding of disease process, treatment plan, medications, and discharge instructions  Description: Complete learning assessment and assess knowledge base    Interventions:  - Provide teaching at level of understanding  - Provide teaching via preferred learning methods  Outcome: Progressing
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
## 2021-05-24 NOTE — LETTER
[address]  FLOOR MED SURG UNIT  [facility] [address]  Dept: [phone]    May 26, 2021     Patient: Guillermo Hinkle   YOB: 1965   Date of Visit: 5/24/2021       To Whom it May Concern:    Roma Madden is under my professional care  She was seen in the hospital from 5/24/2021   to 05/26/21  She may return to work on 05/31/2021 without limitations  If you have any questions or concerns, please don't hesitate to call           Sincerely,          Joey Nogueira MD

## 2021-05-24 NOTE — ED NOTES
Patient fed   [place] [facility]      Arron Garnett RN  05/24/21 8570

## 2021-05-24 NOTE — ASSESSMENT & PLAN NOTE
Wt Readings from Last 3 Encounters:   05/24/21 96 5 kg (212 lb 11 9 oz)   05/15/21 96 2 kg (212 lb)   03/11/21 97 3 kg (214 lb 9 6 oz)       Patient with a history of heart failure  Echo of 09/18/2021 with EF 60% no left ventricular wall motion abnormalities, left ventricular hypertrophy with grade 1 diastolic dysfunction  Patient is taking Lasix 20 mg daily at home  She is clinically euvolemic  Status post Lasix 40 mg in the ED  - continue beta-blocker  - continue lisinopril 20 mg daily

## 2021-05-24 NOTE — ASSESSMENT & PLAN NOTE
ICD placed in lieu of history of V-tach  ICD placed in July 2016 ( MRI conditional)     --Interrogation 11/16/20

## 2021-05-24 NOTE — ED NOTES
Patient's HR briefly returned to 62 and regular, then went back to 130's afib  Dr Franklin Morocho aware        Yoseph Burdick, RN  05/24/21 4170

## 2021-05-25 ENCOUNTER — APPOINTMENT (INPATIENT)
Dept: NON INVASIVE DIAGNOSTICS | Facility: HOSPITAL | Age: 56
DRG: 194 | End: 2021-05-25
Payer: COMMERCIAL

## 2021-05-25 LAB
ALBUMIN SERPL BCP-MCNC: 3.7 G/DL (ref 3.4–4.8)
ALP SERPL-CCNC: 43.5 U/L (ref 35–140)
ALT SERPL W P-5'-P-CCNC: 11 U/L (ref 5–54)
ANION GAP SERPL CALCULATED.3IONS-SCNC: 7 MMOL/L (ref 4–13)
APTT PPP: 29 SECONDS (ref 23–31)
APTT PPP: 36 SECONDS (ref 23–37)
AST SERPL W P-5'-P-CCNC: 16 U/L (ref 15–41)
ATRIAL RATE: 120 BPM
ATRIAL RATE: 60 BPM
BASOPHILS # BLD AUTO: 0.02 THOUSANDS/ΜL (ref 0–0.1)
BASOPHILS NFR BLD AUTO: 0 % (ref 0–1)
BILIRUB SERPL-MCNC: 0.38 MG/DL (ref 0.3–1.2)
BUN SERPL-MCNC: 23 MG/DL (ref 6–20)
CALCIUM SERPL-MCNC: 9.2 MG/DL (ref 8.4–10.2)
CHLORIDE SERPL-SCNC: 104 MMOL/L (ref 96–108)
CO2 SERPL-SCNC: 29 MMOL/L (ref 22–33)
CREAT SERPL-MCNC: 1.63 MG/DL (ref 0.4–1.1)
EOSINOPHIL # BLD AUTO: 0.11 THOUSAND/ΜL (ref 0–0.61)
EOSINOPHIL NFR BLD AUTO: 2 % (ref 0–6)
ERYTHROCYTE [DISTWIDTH] IN BLOOD BY AUTOMATED COUNT: 13.5 % (ref 11.6–15.1)
GFR SERPL CREATININE-BSD FRML MDRD: 35 ML/MIN/1.73SQ M
GLUCOSE SERPL-MCNC: 98 MG/DL (ref 65–140)
HCT VFR BLD AUTO: 40.9 % (ref 34.8–46.1)
HGB BLD-MCNC: 13.2 G/DL (ref 11.5–15.4)
IMM GRANULOCYTES # BLD AUTO: 0.01 THOUSAND/UL (ref 0–0.2)
IMM GRANULOCYTES NFR BLD AUTO: 0 % (ref 0–2)
INR PPP: 1.07 (ref 0.9–1.1)
LYMPHOCYTES # BLD AUTO: 1.35 THOUSANDS/ΜL (ref 0.6–4.47)
LYMPHOCYTES NFR BLD AUTO: 26 % (ref 14–44)
MCH RBC QN AUTO: 27.7 PG (ref 26.8–34.3)
MCHC RBC AUTO-ENTMCNC: 32.3 G/DL (ref 31.4–37.4)
MCV RBC AUTO: 86 FL (ref 82–98)
MONOCYTES # BLD AUTO: 0.54 THOUSAND/ΜL (ref 0.17–1.22)
MONOCYTES NFR BLD AUTO: 10 % (ref 4–12)
NEUTROPHILS # BLD AUTO: 3.17 THOUSANDS/ΜL (ref 1.85–7.62)
NEUTS SEG NFR BLD AUTO: 62 % (ref 43–75)
P AXIS: 104 DEGREES
P AXIS: 96 DEGREES
PLATELET # BLD AUTO: 141 THOUSANDS/UL (ref 149–390)
PMV BLD AUTO: 11.3 FL (ref 8.9–12.7)
POTASSIUM SERPL-SCNC: 3.8 MMOL/L (ref 3.5–5)
PR INTERVAL: 115 MS
PR INTERVAL: 133 MS
PROT SERPL-MCNC: 6.5 G/DL (ref 6.4–8.3)
PROTHROMBIN TIME: 12.1 SECONDS (ref 9.5–12.1)
QRS AXIS: -57 DEGREES
QRS AXIS: -59 DEGREES
QRSD INTERVAL: 158 MS
QRSD INTERVAL: 176 MS
QT INTERVAL: 318 MS
QT INTERVAL: 429 MS
QTC INTERVAL: 415 MS
QTC INTERVAL: 471 MS
RBC # BLD AUTO: 4.76 MILLION/UL (ref 3.81–5.12)
SODIUM SERPL-SCNC: 140 MMOL/L (ref 133–145)
T WAVE AXIS: 109 DEGREES
T WAVE AXIS: 115 DEGREES
TROPONIN I SERPL-MCNC: 0.2 NG/ML (ref 0–0.07)
TROPONIN I SERPL-MCNC: 0.22 NG/ML (ref 0–0.07)
VENTRICULAR RATE: 132 BPM
VENTRICULAR RATE: 56 BPM
WBC # BLD AUTO: 5.2 THOUSAND/UL (ref 4.31–10.16)

## 2021-05-25 PROCEDURE — 93306 TTE W/DOPPLER COMPLETE: CPT | Performed by: INTERNAL MEDICINE

## 2021-05-25 PROCEDURE — 80053 COMPREHEN METABOLIC PANEL: CPT | Performed by: INTERNAL MEDICINE

## 2021-05-25 PROCEDURE — 85025 COMPLETE CBC W/AUTO DIFF WBC: CPT | Performed by: INTERNAL MEDICINE

## 2021-05-25 PROCEDURE — 93306 TTE W/DOPPLER COMPLETE: CPT

## 2021-05-25 PROCEDURE — 99233 SBSQ HOSP IP/OBS HIGH 50: CPT | Performed by: INTERNAL MEDICINE

## 2021-05-25 PROCEDURE — 93010 ELECTROCARDIOGRAM REPORT: CPT | Performed by: INTERNAL MEDICINE

## 2021-05-25 PROCEDURE — 85730 THROMBOPLASTIN TIME PARTIAL: CPT | Performed by: INTERNAL MEDICINE

## 2021-05-25 PROCEDURE — 99222 1ST HOSP IP/OBS MODERATE 55: CPT | Performed by: INTERNAL MEDICINE

## 2021-05-25 PROCEDURE — 85610 PROTHROMBIN TIME: CPT | Performed by: INTERNAL MEDICINE

## 2021-05-25 PROCEDURE — 84484 ASSAY OF TROPONIN QUANT: CPT | Performed by: INTERNAL MEDICINE

## 2021-05-25 RX ORDER — HEPARIN SODIUM 1000 [USP'U]/ML
2000 INJECTION, SOLUTION INTRAVENOUS; SUBCUTANEOUS
Status: DISCONTINUED | OUTPATIENT
Start: 2021-05-25 | End: 2021-05-25

## 2021-05-25 RX ORDER — DILTIAZEM HYDROCHLORIDE 5 MG/ML
10 INJECTION INTRAVENOUS ONCE
Status: COMPLETED | OUTPATIENT
Start: 2021-05-25 | End: 2021-05-25

## 2021-05-25 RX ORDER — VERAPAMIL HYDROCHLORIDE 120 MG/1
120 TABLET, FILM COATED ORAL
Status: DISCONTINUED | OUTPATIENT
Start: 2021-05-25 | End: 2021-05-25

## 2021-05-25 RX ORDER — AMIODARONE HYDROCHLORIDE 200 MG/1
200 TABLET ORAL
Status: DISCONTINUED | OUTPATIENT
Start: 2021-05-25 | End: 2021-05-26

## 2021-05-25 RX ORDER — HEPARIN SODIUM 10000 [USP'U]/100ML
3-20 INJECTION, SOLUTION INTRAVENOUS
Status: DISCONTINUED | OUTPATIENT
Start: 2021-05-25 | End: 2021-05-25

## 2021-05-25 RX ORDER — HEPARIN SODIUM 1000 [USP'U]/ML
4000 INJECTION, SOLUTION INTRAVENOUS; SUBCUTANEOUS
Status: DISCONTINUED | OUTPATIENT
Start: 2021-05-25 | End: 2021-05-25

## 2021-05-25 RX ORDER — METOPROLOL TARTRATE 5 MG/5ML
5 INJECTION INTRAVENOUS EVERY 4 HOURS
Status: DISCONTINUED | OUTPATIENT
Start: 2021-05-25 | End: 2021-05-25

## 2021-05-25 RX ORDER — ASPIRIN 81 MG/1
81 TABLET ORAL DAILY
Status: DISCONTINUED | OUTPATIENT
Start: 2021-05-25 | End: 2021-05-26 | Stop reason: HOSPADM

## 2021-05-25 RX ORDER — METOPROLOL TARTRATE 5 MG/5ML
5 INJECTION INTRAVENOUS EVERY 4 HOURS PRN
Status: DISCONTINUED | OUTPATIENT
Start: 2021-05-25 | End: 2021-05-26 | Stop reason: HOSPADM

## 2021-05-25 RX ORDER — ASPIRIN 81 MG/1
81 TABLET ORAL DAILY
Status: DISCONTINUED | OUTPATIENT
Start: 2021-05-25 | End: 2021-05-25

## 2021-05-25 RX ORDER — NYSTATIN 100000 [USP'U]/G
POWDER TOPICAL 2 TIMES DAILY
Status: DISCONTINUED | OUTPATIENT
Start: 2021-05-25 | End: 2021-05-26 | Stop reason: HOSPADM

## 2021-05-25 RX ADMIN — METOPROLOL TARTRATE 75 MG: 50 TABLET, FILM COATED ORAL at 12:42

## 2021-05-25 RX ADMIN — PERFLUTREN 0.6 ML/MIN: 6.52 INJECTION, SUSPENSION INTRAVENOUS at 12:45

## 2021-05-25 RX ADMIN — DILTIAZEM HYDROCHLORIDE 10 MG: 5 INJECTION, SOLUTION INTRAVENOUS at 01:41

## 2021-05-25 RX ADMIN — HEPARIN SODIUM 11.1 UNITS/KG/HR: 10000 INJECTION, SOLUTION INTRAVENOUS at 02:23

## 2021-05-25 RX ADMIN — ACETAMINOPHEN 650 MG: 325 TABLET, FILM COATED ORAL at 18:05

## 2021-05-25 RX ADMIN — METOPROLOL TARTRATE 75 MG: 50 TABLET, FILM COATED ORAL at 21:00

## 2021-05-25 RX ADMIN — NYSTATIN: 100000 POWDER TOPICAL at 18:13

## 2021-05-25 RX ADMIN — ASPIRIN 81 MG: 81 TABLET, COATED ORAL at 02:23

## 2021-05-25 RX ADMIN — MORPHINE SULFATE 2 MG: 2 INJECTION, SOLUTION INTRAMUSCULAR; INTRAVENOUS at 06:28

## 2021-05-25 RX ADMIN — METOROPROLOL TARTRATE 5 MG: 5 INJECTION, SOLUTION INTRAVENOUS at 00:32

## 2021-05-25 RX ADMIN — METOROPROLOL TARTRATE 5 MG: 5 INJECTION, SOLUTION INTRAVENOUS at 11:44

## 2021-05-25 RX ADMIN — METOPROLOL TARTRATE 75 MG: 50 TABLET, FILM COATED ORAL at 18:04

## 2021-05-25 RX ADMIN — FUROSEMIDE 20 MG: 20 TABLET ORAL at 08:20

## 2021-05-25 RX ADMIN — LISINOPRIL 20 MG: 20 TABLET ORAL at 08:19

## 2021-05-25 RX ADMIN — RIVAROXABAN 20 MG: 20 TABLET, FILM COATED ORAL at 10:33

## 2021-05-25 RX ADMIN — METOPROLOL TARTRATE 75 MG: 50 TABLET, FILM COATED ORAL at 08:19

## 2021-05-25 RX ADMIN — PANTOPRAZOLE SODIUM 40 MG: 40 TABLET, DELAYED RELEASE ORAL at 06:25

## 2021-05-25 RX ADMIN — NICOTINE 1 PATCH: 7 PATCH, EXTENDED RELEASE TRANSDERMAL at 15:45

## 2021-05-25 RX ADMIN — MORPHINE SULFATE 2 MG: 2 INJECTION, SOLUTION INTRAMUSCULAR; INTRAVENOUS at 20:01

## 2021-05-25 RX ADMIN — ACETAMINOPHEN 650 MG: 325 TABLET, FILM COATED ORAL at 02:22

## 2021-05-25 RX ADMIN — MORPHINE SULFATE 2 MG: 2 INJECTION, SOLUTION INTRAMUSCULAR; INTRAVENOUS at 12:51

## 2021-05-25 RX ADMIN — NYSTATIN: 100000 POWDER TOPICAL at 08:23

## 2021-05-25 RX ADMIN — AMIODARONE HYDROCHLORIDE 200 MG: 200 TABLET ORAL at 10:33

## 2021-05-25 RX ADMIN — ACETAMINOPHEN 650 MG: 325 TABLET, FILM COATED ORAL at 10:32

## 2021-05-25 NOTE — UTILIZATION REVIEW
Inpatient Admission Authorization Request   NOTIFICATION OF INPATIENT ADMISSION/INPATIENT AUTHORIZATION REQUEST   SERVICING FACILITY:   [place] [address]   [id number] [place] [address], [address] [place] [address]  Tax ID: [id number]  NPI: [id number]  Place of Service: Inpatient [address]  Place of Service Code: 24     ATTENDING PROVIDER:  Attending Name and NPI#: Darryn Raza, [address] [[id number]]  Address: [address] [place] [address], [address] [place] [address]  Phone:  [phone]     UTILIZATION REVIEW CONTACT:  [facility] Utilization   Network Utilization Review Department  Phone: [phone]  Fax: [phone]  Email: [email or web address]     PHYSICIAN ADVISORY SERVICES:  FOR [id number] REVIEW - MEDICAL NECESSITY DENIAL  Phone: [phone]  Fax: [phone]  Email: [email or web address]     TYPE OF REQUEST:  Inpatient Status     ADMISSION INFORMATION:  ADMISSION DATE/TIME: 5/24/21 1615  PATIENT DIAGNOSIS CODE/DESCRIPTION:  Unstable angina pectoris ([address] ) [I20 0]  Chest pain [R07 9]  Atrial fibrillation with tachycardic ventricular rate ([address] ) [I48 91]  DISCHARGE DATE/TIME: No discharge date for patient encounter  DISCHARGE DISPOSITION (IF DISCHARGED): Home/Self Care     IMPORTANT INFORMATION:  Please contact the [facility] directly with any questions or concerns regarding this request  Department voicemails are confidential     Send requests for admission clinical reviews, concurrent reviews, approvals, and administrative denials due to lack of clinical to fax [phone]

## 2021-05-25 NOTE — QUICK NOTE
Patient noted for elevated troponins and T-wave inversions in anterolateral leads on EKG conducted at 01:15  Will initiate heparin gtt

## 2021-05-25 NOTE — ASSESSMENT & PLAN NOTE
Wt Readings from Last 3 Encounters:   05/24/21 96 5 kg (212 lb 11 9 oz)   05/15/21 96 2 kg (212 lb)   03/11/21 97 3 kg (214 lb 9 6 oz)       Patient with a history of heart failure  Echo of 09/18/2021 with EF 60% no left ventricular wall motion abnormalities, left ventricular hypertrophy with grade 1 diastolic dysfunction  Patient is taking Lasix 20 mg daily at home  She is clinically euvolemic  - continue beta-blocker  - continue lisinopril 20 mg daily  - continue siria Lasix 20 mg daily  - follow echo

## 2021-05-25 NOTE — QUICK NOTE
Patient reports that she has been experiencing a frontal headache, pulsatile in nature, and 8/10 in severity, not relieved by current analgesia management  Patient additionally reports substernal chest pressure that is non-radiating and persistent, described as "something heavy lying on her chest"  No nausea, vomiting or diaphoresis  Previous EKG and troponin trend during this admission reviewed  Rhythm is currently Afib with HR ranging in the 130's-140's  Will provide one dose of 5 mg IV lopressor, repeat troponin and obtain EKG

## 2021-05-25 NOTE — UTILIZATION REVIEW
Initial Clinical Review    Admission: Date/Time/Statement:   Admission Orders (From admission, onward)     Ordered        05/24/21 1615  Inpatient Admission  Once                Orders Placed This Encounter   Procedures    Inpatient Admission     Standing Status:   Standing     Number of Occurrences:   1     Order Specific Question:   Level of Care     Answer:   Med Surg [16]     Order Specific Question:   Estimated length of stay     Answer:   More than 2 Midnights     Order Specific Question:   Certification     Answer:   I certify that inpatient services are medically necessary for this patient for a duration of greater than two midnights  See H&P and MD Progress Notes for additional information about the patient's course of treatment  ED Arrival Information     Expected Arrival Acuity Service Admission Type    - 5/24/2021 14:07 Emergent Hospitalist Emergency    Arrival Complaint    Chest Pain     Chief Complaint   Patient presents with    Chest Pain     dizziness and lightheadedness that began a few days ago and chest pain began today  Initial Presentation:    63 y/o female with PMHx HTN, atrial fibrillation on Xarelto, hypertrophic cardiomyopathy, ICD in place diastolic CHF, CKD stage 3, Hepatitis  C treated  Presented emergently to [facility] ED on 5/24/21 2nd 1 day history of constant chest pain that was exacerbated by walking upstairs - reported her heart felt like it was racing   In ED -   /111  RR 22   Per ED MD - EKG showed atrial fib with rate of 130-150 and diffuse ST T wave changes    ED Tx:  IV Lopressor x 3 with initial conversion to SR but returned to Uncontrolled atrial fib - , given  IV Cardizem 10 mg, started IV Heparin gtt,  IV Lasix, IV MS + IV Zofran    She converted to sinus rhythm and then back to AFib    Admitted Inpatient 5/24/21 at 33 64 74 2nd Uncontrolled atrial fib - Cardiology consulted     5/25 Cardiology:  Principal Problem:   Atrial fibrillation [facility])    Active Problems:    ICD (implantable cardioverter-defibrillator) discharge    Hypertrophic cardiomyopathy ([address] )      Chronic diastolic congestive heart failure (HCC)     PAF with RVR, more recent episodes recently  She has non MI elevation in troponin in the setting of above with HCM  Prior afib ablation  Currently maintained on metoprolol, verapamil, xarelto  She was maintaining sinus before, but now with more afib which is symptomatic      Plan:   Check echo today to evaluate for any change, and evaluate LVOT gradient  Continue metoprolol, verapamil  Add amiodarone 200mg daily, Will not do an oral load, since I don't want this to be lost if she doesn't have close follow up  This would not be ideal long term treatment, but in the meantime, this will hopefully help suppress her afib    She needs follow up with EP to discuss alternate antiarrhythmics  She is back in an A paced rhythm now      ED Triage Vitals   Temperature Pulse Respirations Blood Pressure SpO2   05/24/21 1420 05/24/21 1420 05/24/21 1420 05/24/21 1420 05/24/21 1420   97 7 °F (36 5 °C) (!) 134 22 (!) 168/111 98 %      Temp Source Heart Rate Source Patient Position - Orthostatic VS BP Location FiO2 (%)   05/24/21 1420 05/24/21 1619 05/24/21 1619 05/24/21 1619 --   Oral Monitor Lying Right arm       Pain Score       05/24/21 1443       9          Wt Readings from Last 1 Encounters:   05/24/21 96 5 kg (212 lb 11 9 oz)     Additional Vital Signs:   25/21 0658  97 1 °F (36 2 °C)Abnormal   65  17  118/62  99  96 %  None (Room air)  Lying   05/25/21 0148  --  75  --  95/72  --  --  --  --   05/25/21 0045  --  99  --  154/88  --  --  --  Lying   05/25/21 0022  98 °F (36 7 °C)  146Abnormal   20  114/84  --  97 %  None (Room air)  --   05/24/21 2217  --  114Abnormal   --  118/96  --  --  --  --   05/24/21 1924  --  106Abnormal   --  133/87  --  --  --  --   05/24/21 1809  99 7 °F (37 6 °C)  --  --  --  --  97 %  None (Room air)  --   05/24/21 1647  --  76  16  126/74  --  98 %  None (Room air)  Lying   05/24/21 1619  97 2 °F (36 2 °C)Abnormal   127Abnormal   18  127/95  --  97 %  None (Room air)  Lying   05/24/21 1510  --  61  18  138/79  --  99 %  None (Room air)  --   05/24/21 1506  --  138Abnormal   18  --  --  99 %  --  --   05/24/21 1500  --  62  18  166/85  --  98 %  None (Room air)  --   05/24/21 1443  --  121Abnormal   18  162/91  --  97 %  None (Room air)  --   05/24/21 1433  --  133Abnormal   21  158/90  --  97 %         I/O 05/24 0701   05/25 0700   Urine (mL/kg/hr) 1000   Total Output 1000   Net -1000     Pertinent Labs/Diagnostic Test Results:     Results from last 7 days   Lab Units 05/25/21  0631 05/24/21  1429   WBC Thousand/uL 5 20 8 25   HEMOGLOBIN g/dL 13 2 14 1   HEMATOCRIT % 40 9 43 4   PLATELETS Thousands/uL 141* 195   NEUTROS ABS Thousands/µL 3 17 5 44     Results from last 7 days   Lab Units 05/25/21  0631 05/24/21  1429   SODIUM mmol/L 140 143   POTASSIUM mmol/L 3 8 3 8   CHLORIDE mmol/L 104 106   CO2 mmol/L 29 29   ANION GAP mmol/L 7 8   BUN mg/dL 23* 13   CREATININE mg/dL 1 63* 1 62*   EGFR ml/min/1 73sq m 35 35   CALCIUM mg/dL 9 2 9 8     Results from last 7 days   Lab Units 05/25/21  0631 05/24/21  1429   AST U/L 16 18   ALT U/L 11 13   ALK PHOS U/L 43 5 53 2   TOTAL PROTEIN g/dL 6 5 7 8   ALBUMIN g/dL 3 7 4 4   TOTAL BILIRUBIN mg/dL 0 38 0 47     Results from last 7 days   Lab Units 05/25/21  0631 05/24/21  1429   GLUCOSE RANDOM mg/dL 98 134     Results from last 7 days   Lab Units 05/25/21  0827 05/25/21  0127 05/24/21  2218 05/24/21  1837 05/24/21  1429   TROPONIN I ng/mL 0 22* 0 20* 0 24* 0 20* 0 24*       Results from last 7 days   Lab Units 05/25/21  0827 05/25/21  0159 05/24/21  1429   PROTIME seconds  --  12 1 13 2*   INR   --  1 07 1 18*   PTT seconds 36 29 32*     Results from last 7 days   Lab Units 05/24/21  1429   BNP pg/mL 2,706 0*     # 1 - 12 LEAD EKG  Atrial fibrillation with rapid ventricular response  Left ventricular hypertrophy    # 2 - 12 LEAD EKG  Atrial-paced complexes  Left ventricular hypertrophy  Electronic demand pacing    CHEST X RAY  No acute cardiopulmonary disease  ED Treatment:   Medication Administration from 05/24/2021 1406 to 05/24/2021 1740       Date/Time Order Dose Route Action     05/24/2021 1427 metoprolol (LOPRESSOR) injection 5 mg 5 mg Intravenous Given     05/24/2021 1440 metoprolol (LOPRESSOR) injection 5 mg 5 mg Intravenous Given     05/24/2021 1452 metoprolol (LOPRESSOR) injection 5 mg 5 mg Intravenous Given     05/24/2021 1504 morphine injection 2 mg 2 mg Intravenous Given     05/24/2021 1504 ondansetron (ZOFRAN) injection 4 mg 4 mg Intravenous Given     05/24/2021 1615 furosemide (LASIX) injection 40 mg 40 mg Intravenous Given     05/24/2021 1615 diltiazem (CARDIZEM) 125 mg in sodium chloride 0 9 % 125 mL infusion 15 mg/hr Intravenous New Bag     *IV diltiazem (CARDIZEM) injection 10 mg  - Intravenous given 1330 - 1341    Past Medical History:   Diagnosis Date    Arrhythmia     Arthritis     Atrial fibrillation (HCC)     Breast lump     CKD (chronic kidney disease) stage 3, GFR 30-59 ml/min (HCC)     Disease of thyroid gland     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 ([facility] ) 5/25/2020    GERD (gastroesophageal reflux disease)     H/O transfusion 1987    Hepatitis C     resolved    Hepatitis C     Hyperlipidemia     Hypertension     Irregular heart beat     Pacemaker     Sleep apnea     no cpap    Tachycardia      Present on Admission:   Elevated troponin   Atrial fibrillation with rapid ventricular response (HCC)   Gastroesophageal reflux disease without esophagitis   Stage 3 chronic kidney disease [facility] )   Nicotine dependence   Hypertrophic cardiomyopathy ([facility] )      Admitting Diagnosis: Unstable angina pectoris ([facility] ) [I20 0]  Chest pain [R07 9]  Atrial fibrillation with tachycardic ventricular rate ([facility] ) [I48 91]    Age/Sex: 64 y o  female    Admission Orders:  Telemetry  VS q4hrs  Nasal O2 at 2 L/min - Titrate SpO2 > 92 %  Continuous Pulse Oximetry  Up + OOB as tolerated  Diet Cardiovascular; Cardiac  I+O q shift  Daily weight  Serial Troponin q3hrs x 3  ECHO  ICD Interrogation    Scheduled Medications:  amiodarone, 200 mg, Oral, Daily With Breakfast  aspirin, 81 mg, Oral, Daily  furosemide, 20 mg, Oral, Daily  lisinopril, 20 mg, Oral, Daily  IV metoprolol, 5 mg, Intravenous, Once  metoprolol tartrate, 75 mg, Oral, 4x Daily  nicotine, 1 patch, Transdermal, Daily  nystatin, , Topical, BID  pantoprazole, 40 mg, Oral, Early Morning  rivaroxaban, 20 mg, Oral, Daily With Breakfast    Continuous IV Infusions:  IVF heparin (porcine) 25,000 units in 0 45% NaCl 250 mL infusion (premix) TITRATE PER PTT     PRN Meds:  acetaminophen, 650 mg, Oral, Q6H PRN GIVEN X 3  IV metoprolol, 5 mg, Intravenous, Q6H PRN HR > 110 - GIVEN X 2  IV morphine injection, 2 mg, Intravenous, Q6H PRN GIVEN X 3    IP CONSULT TO CARDIOLOGY    Network Utilization Review Department  ATTENTION: Please call with any questions or concerns to [phone] and carefully listen to the prompts so that you are directed to the right person  All voicemails are confidential   [facility] all requests for admission clinical reviews, approved or denied determinations and any other requests to dedicated fax number below belonging to the campus where the patient is receiving treatment   List of dedicated fax numbers for the Facilities:  [address] DENIALS (Administrative/Medical Necessity) [phone]   [address] (Maternity/NICU/Pediatrics) [phone]   [address] [phone]   [address] [place] [address] [id number] [facility] [phone] [address] [address] [address] [phone]

## 2021-05-25 NOTE — ASSESSMENT & PLAN NOTE
History of atrial fibrillation/flutter , s/p ablation  Came in with heart rate 150 beats per minute  EKG with AFib/a flutter with RVR  Patient is on metoprolol tartrate 150 mg b i d   at home, was also prescribed verapamil 120 mg in the past but reported not taking and pharmacy reached out did not refill since Dec     She received Lopressor 5 mg IV 3 times  In ED, got Cardizem 125 mg at 4:25 p m  Overnight demanded additional lopressor 5 mg and cardizem 10 IV which controlled heart rate around 70  This morning heart rate back to 130 BPM  - Will control with additional dose of Cardizem 10 mg b i d   - start on amiodarone 200 mg per cardiology recommendation  - monitor on telemetry  - continue metoprolol tartrate 75 mg q 6 hours  - Lopressor 5 mg IV p r n  for heart rate more than 120  - continue home verapamil 120 mg daily  TSH reviewed in epic within normal limits

## 2021-05-25 NOTE — ASSESSMENT & PLAN NOTE
As evident per previous echo  History of VT  ICD in in places since 2016  - follow-up ICD interrogation  - follow-up echo to assess for flow obstructive pattern

## 2021-05-25 NOTE — ASSESSMENT & PLAN NOTE
Lab Results   Component Value Date    EGFR 35 05/25/2021    EGFR 35 05/24/2021    EGFR 38 03/11/2021    CREATININE 1 63 (H) 05/25/2021    CREATININE 1 62 (H) 05/24/2021    CREATININE 1 53 (H) 03/11/2021   Baseline creatinine 1 5- 1 7  - monitor BMP in the morning

## 2021-05-25 NOTE — ASSESSMENT & PLAN NOTE
Non MI troponin elevation secondary to CKD, AFib with RVR may be contributing  Baseline troponin fluctuate 0 1-0 2  Troponin trended flat 0 24->0 22->0 24->0 20    Patient presents with chest pain that started in the morning around 7:00 a m  Pressure-like middle chest exacerbated by physical activity, associated with shortness of breath,  7/10  The pain was constant  She states she had similar pain  in the past     Patient is a smoker, with hypertension, no personal history of MI  Cardiac catheterization in 1/2019 without abnormalities  Last echo 09/18/2020 with EF 60% no regional wall motion abnormalities, significantly LV hypertrophy, grade 1 diastolic dysfunction  She follows with cardiologist Dr Melanie Fisher  Patient was supposed to get stress test done as outpatient but never made it  Lipid panel from 02/21 within normal limits  She is not on statin    Patient was with repeated chest pains overnight  EKG with paced rhythm could not exclude ischemia  So she was started on heparin drip overnight    However taking intention previous troponins and 100 AFib with RVR, previous normal cardiac catheterization MI is least likely    - follow echo  - monitor on telemetry  - monitor vital signs  - cardiology is on board

## 2021-05-25 NOTE — PROGRESS NOTES
[address] [address]  66   Progress Note - Loreta Odonnell 1965, 64 y o  female MRN: [id number]  Unit/Bed#: -Keisha Encounter: [id number]  Primary Care Provider: Ryder Mcgarry MD   Date and time admitted to hospital: 5/24/2021  2:12 PM    * Atrial fibrillation with rapid ventricular response [facility])  Assessment & Plan  History of atrial fibrillation/flutter , s/p ablation  Came in with heart rate 150 beats per minute  EKG with AFib/a flutter with RVR  Patient is on metoprolol tartrate 150 mg b i d   at home, was also prescribed verapamil 120 mg in the past but reported not taking and pharmacy reached out did not refill since Dec     She received Lopressor 5 mg IV 3 times  In ED, got Cardizem 125 mg at 4:25 p m  Overnight demanded additional lopressor 5 mg and cardizem 10 IV which controlled heart rate around 70  This morning heart rate back to 130 BPM    - start on amiodarone 200 mg per cardiology recommendation  - continue metoprolol tartrate 75 mg q 6 hours  - Lopressor 5 mg IV p r n   q4h for heart rate more than 120  - monitor on telemetry  TSH reviewed in epic within normal limits      Chronic diastolic congestive heart failure (HCC)  Assessment & Plan  Wt Readings from Last 3 Encounters:   05/24/21 96 5 kg (212 lb 11 9 oz)   05/15/21 96 2 kg (212 lb)   03/11/21 97 3 kg (214 lb 9 6 oz)       Patient with a history of heart failure  Echo of 09/18/2021 with EF 60% no left ventricular wall motion abnormalities, left ventricular hypertrophy with grade 1 diastolic dysfunction  Patient is taking Lasix 20 mg daily at home  She is clinically euvolemic  - continue beta-blocker  - continue lisinopril 20 mg daily  - continue siria Lasix 20 mg daily  - follow echo      Stage 3 chronic kidney disease [facility])  Assessment & Plan  Lab Results   Component Value Date    EGFR 35 05/25/2021    EGFR 35 05/24/2021    EGFR 38 03/11/2021    CREATININE 1 63 (H) 05/25/2021    CREATININE 1 62 (H) 05/24/2021    CREATININE 1 53 (H) 03/11/2021   Baseline creatinine 1 5- 1 7  - monitor BMP in the morning    Elevated troponin  Assessment & Plan  Non MI troponin elevation secondary to CKD, AFib with RVR may be contributing  Baseline troponin fluctuate 0 1-0 2  Troponin trended flat 0 24->0 22->0 24->0 20    Patient presents with chest pain that started in the morning around 7:00 a m  Pressure-like middle chest exacerbated by physical activity, associated with shortness of breath,  7/10  The pain was constant  She states she had similar pain  in the past     Patient is a smoker, with hypertension, no personal history of MI  Cardiac catheterization in 1/2019 without abnormalities  Last echo 09/18/2020 with EF 60% no regional wall motion abnormalities, significantly LV hypertrophy, grade 1 diastolic dysfunction  She follows with cardiologist Dr Darleen Valentine  Patient was supposed to get stress test done as outpatient but never made it  Lipid panel from 02/21 within normal limits  She is not on statin    Patient was with repeated chest pains overnight  EKG with paced rhythm could not exclude ischemia  So she was started on heparin drip overnight  However taking intention previous troponins and 100 AFib with RVR, previous normal cardiac catheterization MI is least likely    - follow echo  - monitor on telemetry  - monitor vital signs  - cardiology is on board    Hypertrophic cardiomyopathy [facility])  Assessment & Plan  As evident per previous echo  History of VT  ICD in in places since 2016  - follow-up ICD interrogation  - follow-up echo to assess for flow obstructive pattern      Nicotine dependence  Assessment & Plan  Patient smokes half pack cigarettes daily  Provide nicotine patch 7 mcg    ICD (implantable cardioverter-defibrillator) discharge  Assessment & Plan  ICD placed in lieu of history of V-tach  ICD placed in July 2016 ( MRI conditional)     --Interrogation 11/16/20    Gastroesophageal reflux disease without esophagitis  Assessment & Plan  Continue home Protonix      VTE Pharmacologic Prophylaxis:   Pharmacologic: Rivaroxaban (Xarelto)  Mechanical VTE Prophylaxis in Place: Yes    Discussions with Specialists or Other Care Team Provider:  Cardiology    Education and Discussions with Family / Patient:     Current Length of Stay: 1 day(s)    Current Patient Status: Inpatient     Discharge Plan / Estimated Discharge Date:  Probably tomorrow    Code Status: Level 1 - Full Code      Subjective:   Patient examined by me in bed eating  She has no new complaints  She was to go home  Chest pain has resolved  Mild headache  Patient denies chest pain, shortness of breath, palpitations, leg swelling, weakness/numbness anywhere  Objective:     Vitals:   Temp (24hrs), Av 1 °F (36 7 °C), Min:97 1 °F (36 2 °C), Max:99 7 °F (37 6 °C)    Temp:  [97 1 °F (36 2 °C)-99 7 °F (37 6 °C)] 98 7 °F (37 1 °C)  HR:  [] 148  Resp:  [16-22] 20  BP: ()/() 127/81  SpO2:  [96 %-99 %] 99 %  Body mass index is 33 32 kg/m²  Input and Output Summary (last 24 hours): Intake/Output Summary (Last 24 hours) at 2021 1340  Last data filed at 2021 1704  Gross per 24 hour   Intake --   Output 1000 ml   Net -1000 ml       Physical Exam:     Physical Exam  Vitals signs reviewed  Constitutional:       General: She is not in acute distress  Appearance: Normal appearance  She is not ill-appearing or toxic-appearing  HENT:      Head: Normocephalic and atraumatic  Nose: Nose normal       Mouth/Throat:      Mouth: Mucous membranes are moist    Eyes:      General: No scleral icterus  Extraocular Movements: Extraocular movements intact  Conjunctiva/sclera: Conjunctivae normal    Neck:      Vascular: No JVD  Cardiovascular:      Rate and Rhythm: Tachycardia present  Rhythm irregular  Pulses: Normal pulses  Heart sounds: Normal heart sounds  No murmur  No gallop      Pulmonary: Effort: Pulmonary effort is normal  No respiratory distress  Breath sounds: Normal breath sounds  No decreased breath sounds, wheezing, rhonchi or rales  Abdominal:      General: Bowel sounds are normal  There is no distension  Tenderness: There is no abdominal tenderness  There is no guarding or rebound  Musculoskeletal:         General: No swelling or tenderness  Right lower leg: She exhibits no tenderness  No edema  Left lower leg: She exhibits no tenderness  No edema  Skin:     General: Skin is warm  Coloration: Skin is not jaundiced  Findings: No erythema or rash  Neurological:      General: No focal deficit present  Mental Status: She is alert and oriented to person, place, and time  Psychiatric:         Mood and Affect: Mood normal          Behavior: Behavior normal          Additional Data:     Labs:    Results from last 7 days   Lab Units 05/25/21  0631   WBC Thousand/uL 5 20   HEMOGLOBIN g/dL 13 2   HEMATOCRIT % 40 9   PLATELETS Thousands/uL 141*   NEUTROS PCT % 62   LYMPHS PCT % 26   MONOS PCT % 10   EOS PCT % 2     Results from last 7 days   Lab Units 05/25/21  0631   POTASSIUM mmol/L 3 8   CHLORIDE mmol/L 104   CO2 mmol/L 29   BUN mg/dL 23*   CREATININE mg/dL 1 63*   CALCIUM mg/dL 9 2   ALK PHOS U/L 43 5   ALT U/L 11   AST U/L 16     Results from last 7 days   Lab Units 05/25/21  0159   INR  1 07       * I Have Reviewed All Lab Data Listed Above  * Additional Pertinent Lab Tests Reviewed:  [address] Admission Reviewed    Imaging:    Imaging Reports Reviewed Today Include:  Chest x-ray  Imaging Personally Reviewed by Myself Includes:  None    Recent Cultures (last 7 days):           Last 24 Hours Medication List:   Current Facility-Administered Medications   Medication Dose Route Frequency Provider Last Rate    acetaminophen  650 mg Oral Q6H PRN Cathy Guzman MD      amiodarone  200 mg Oral Daily With MD Yuni Bridges aspirin  81 mg Oral Daily Emeli Almeida MD      furosemide  20 mg Oral Daily Femi Hopkins MD      lisinopril  20 mg Oral Daily Femi Hopkins MD      metoprolol  5 mg Intravenous Once Femi Hopkins MD      metoprolol  5 mg Intravenous Q6H PRN Femi Hopkins MD      metoprolol tartrate  75 mg Oral 4x Daily Jose Daniel Noonan MD      morphine injection  2 mg Intravenous Q6H PRN Femi Hopkins MD      nicotine  1 patch Transdermal Daily Femi Hopkins MD      nystatin   Topical BID Emeli Almeida MD      pantoprazole  40 mg Oral Early Morning Femi Hopkins MD      rivaroxaban  20 mg Oral Daily With Cat Harris MD          Today, Patient Was Seen By: Femi Hopkins MD    ** Please Note: This note has been constructed using a voice recognition system   **

## 2021-05-26 ENCOUNTER — REMOTE DEVICE CLINIC VISIT (OUTPATIENT)
Dept: CARDIOLOGY CLINIC | Facility: CLINIC | Age: 56
End: 2021-05-26
Payer: COMMERCIAL

## 2021-05-26 VITALS
SYSTOLIC BLOOD PRESSURE: 118 MMHG | HEART RATE: 81 BPM | OXYGEN SATURATION: 100 % | BODY MASS INDEX: 33.77 KG/M2 | RESPIRATION RATE: 19 BRPM | HEIGHT: 67 IN | DIASTOLIC BLOOD PRESSURE: 84 MMHG | TEMPERATURE: 98.2 F | WEIGHT: 215.17 LBS

## 2021-05-26 DIAGNOSIS — Z45.02 ICD (IMPLANTABLE CARDIOVERTER-DEFIBRILLATOR) DISCHARGE: Primary | ICD-10-CM

## 2021-05-26 PROBLEM — I50.42 CHRONIC COMBINED SYSTOLIC AND DIASTOLIC CONGESTIVE HEART FAILURE (HCC): Status: ACTIVE | Noted: 2020-03-09

## 2021-05-26 LAB
ANION GAP SERPL CALCULATED.3IONS-SCNC: 7 MMOL/L (ref 4–13)
BUN SERPL-MCNC: 25 MG/DL (ref 6–20)
CALCIUM SERPL-MCNC: 9 MG/DL (ref 8.4–10.2)
CHLORIDE SERPL-SCNC: 103 MMOL/L (ref 96–108)
CO2 SERPL-SCNC: 27 MMOL/L (ref 22–33)
CREAT SERPL-MCNC: 1.66 MG/DL (ref 0.4–1.1)
ERYTHROCYTE [DISTWIDTH] IN BLOOD BY AUTOMATED COUNT: 13.6 % (ref 11.6–15.1)
GFR SERPL CREATININE-BSD FRML MDRD: 34 ML/MIN/1.73SQ M
GLUCOSE SERPL-MCNC: 105 MG/DL (ref 65–140)
HCT VFR BLD AUTO: 42.5 % (ref 34.8–46.1)
HGB BLD-MCNC: 13.5 G/DL (ref 11.5–15.4)
MCH RBC QN AUTO: 27.4 PG (ref 26.8–34.3)
MCHC RBC AUTO-ENTMCNC: 31.8 G/DL (ref 31.4–37.4)
MCV RBC AUTO: 86 FL (ref 82–98)
PLATELET # BLD AUTO: 138 THOUSANDS/UL (ref 149–390)
PMV BLD AUTO: 11.7 FL (ref 8.9–12.7)
POTASSIUM SERPL-SCNC: 4.1 MMOL/L (ref 3.5–5)
RBC # BLD AUTO: 4.92 MILLION/UL (ref 3.81–5.12)
SODIUM SERPL-SCNC: 137 MMOL/L (ref 133–145)
WBC # BLD AUTO: 5.44 THOUSAND/UL (ref 4.31–10.16)

## 2021-05-26 PROCEDURE — 99239 HOSP IP/OBS DSCHRG MGMT >30: CPT | Performed by: INTERNAL MEDICINE

## 2021-05-26 PROCEDURE — 93295 DEV INTERROG REMOTE 1/2/MLT: CPT | Performed by: INTERNAL MEDICINE

## 2021-05-26 PROCEDURE — 93296 REM INTERROG EVL PM/IDS: CPT | Performed by: INTERNAL MEDICINE

## 2021-05-26 PROCEDURE — 99232 SBSQ HOSP IP/OBS MODERATE 35: CPT | Performed by: INTERNAL MEDICINE

## 2021-05-26 PROCEDURE — 85027 COMPLETE CBC AUTOMATED: CPT | Performed by: INTERNAL MEDICINE

## 2021-05-26 PROCEDURE — 80048 BASIC METABOLIC PNL TOTAL CA: CPT | Performed by: INTERNAL MEDICINE

## 2021-05-26 RX ORDER — NYSTATIN 100000 [USP'U]/G
POWDER TOPICAL 2 TIMES DAILY
Qty: 15 G | Refills: 0 | Status: SHIPPED | OUTPATIENT
Start: 2021-05-26 | End: 2022-06-17

## 2021-05-26 RX ORDER — AMIODARONE HYDROCHLORIDE 200 MG/1
TABLET ORAL
Qty: 60 TABLET | Refills: 0 | Status: SHIPPED | OUTPATIENT
Start: 2021-05-26 | End: 2021-07-26

## 2021-05-26 RX ORDER — AMIODARONE HYDROCHLORIDE 200 MG/1
200 TABLET ORAL
Status: DISCONTINUED | OUTPATIENT
Start: 2021-05-26 | End: 2021-05-26 | Stop reason: HOSPADM

## 2021-05-26 RX ADMIN — METOROPROLOL TARTRATE 5 MG: 5 INJECTION, SOLUTION INTRAVENOUS at 07:39

## 2021-05-26 RX ADMIN — PANTOPRAZOLE SODIUM 40 MG: 40 TABLET, DELAYED RELEASE ORAL at 06:19

## 2021-05-26 RX ADMIN — METOPROLOL TARTRATE 75 MG: 50 TABLET, FILM COATED ORAL at 08:55

## 2021-05-26 RX ADMIN — FUROSEMIDE 20 MG: 20 TABLET ORAL at 08:55

## 2021-05-26 RX ADMIN — ASPIRIN 81 MG: 81 TABLET, COATED ORAL at 08:55

## 2021-05-26 RX ADMIN — AMIODARONE HYDROCHLORIDE 200 MG: 200 TABLET ORAL at 12:19

## 2021-05-26 RX ADMIN — NICOTINE 1 PATCH: 7 PATCH, EXTENDED RELEASE TRANSDERMAL at 08:57

## 2021-05-26 RX ADMIN — METOPROLOL TARTRATE 75 MG: 50 TABLET, FILM COATED ORAL at 12:31

## 2021-05-26 RX ADMIN — AMIODARONE HYDROCHLORIDE 200 MG: 200 TABLET ORAL at 07:33

## 2021-05-26 RX ADMIN — NYSTATIN: 100000 POWDER TOPICAL at 08:56

## 2021-05-26 RX ADMIN — RIVAROXABAN 20 MG: 20 TABLET, FILM COATED ORAL at 07:33

## 2021-05-26 RX ADMIN — MORPHINE SULFATE 2 MG: 2 INJECTION, SOLUTION INTRAMUSCULAR; INTRAVENOUS at 12:15

## 2021-05-26 RX ADMIN — MORPHINE SULFATE 2 MG: 2 INJECTION, SOLUTION INTRAMUSCULAR; INTRAVENOUS at 03:02

## 2021-05-26 RX ADMIN — ACETAMINOPHEN 650 MG: 325 TABLET, FILM COATED ORAL at 07:32

## 2021-05-26 RX ADMIN — LISINOPRIL 20 MG: 20 TABLET ORAL at 08:55

## 2021-05-26 NOTE — PROGRESS NOTES
MDT-DUAL CHAMBER ICD (AAI-DDD MODE) - ACTIVE SYSTEM IS MRI CONDITIONAL   CARELINK TRANSMISSION: BATTERY VOLTAGE ADEQUATE (4 5 YRS)  AP 84 8%  0 3%  ALL AVAILABLE LEAD PARAMETERS WITHIN NORMAL LIMITS  2 VT-NS EPISODES W/ AVAIL  EGRMS SHOWING RVR, MOST RECENT EPISODES DURATION 14:58 MINS @ ~ 191 BPM  EF 60% (9/2020 ECHO)  NO THERAPY DELIVERED  240 AT/AF EPISODES, MAX EPISODE DURATION ~ 2 HRS W/ALL EGRMS SHOWING PAF  AF BURDEN 4 3%  HX: SAME & PT TAKES XARELTO, METOPROLOL SUCC  OPTI-VOL WITHIN NORMAL LIMITS   NORMAL DEVICE FUNCTION    ES

## 2021-05-26 NOTE — DISCHARGE INSTR - AVS FIRST PAGE
DISCHARGE INSTRUCTIONS   1  Follow up with Dr Albertina Gibson MD in one week  in regards to recent hospitalization    Follow ups:-    Cardiology electrophysiologist Dr Abimael Nava    2  Take medications regularly     New medication:-  Amiodaron take 1 pill three times a day for 7 days, then 1 pill twice a day for 7 days, then 1 pill daily  Nicotine patch 1 patch daily apply new skin  Nystatin powder use in skin fold there is affected with rash      3  Come back to the ER if symptoms recur or worsen   4  Activity as tolerated  5   Diet : cardiac low salt low saturated fat diet

## 2021-05-26 NOTE — ASSESSMENT & PLAN NOTE
Non MI troponin elevation secondary to CKD, AFib with RVR may be contributing  Baseline troponin fluctuate 0 1-0 2  Troponin trended flat 0 24->0 22->0 24->0 20    Patient presented with severe pressure like chest pain  In setting of atrial fibrillation with RVR  Patient is a smoker, with hypertension, no personal history of MI  Cardiac catheterization in 1/2019 without abnormalities  She follows with cardiologist Dr Sheeba Henderson  Patient was supposed to get stress test done as outpatient but never made it  EKG was without STEMI however difficult to  For ischemiain the setting of paced rhythm  Lipid panel from 02/21 within normal limits  She was not on statin  BRITTA was 2-3    Patient was initiated on heparin drip on the night of admission  Patient was seen by cardiologist in the morning  Chest pain improved with heart rate control  Cardiology was on board  Heprin drip was discontinued   No urgent intervention was recomended    - further evaluation for possible CAD as outpatient

## 2021-05-26 NOTE — ASSESSMENT & PLAN NOTE
Wt Readings from Last 3 Encounters:   05/26/21 97 6 kg (215 lb 2 7 oz)   05/15/21 96 2 kg (212 lb)   03/11/21 97 3 kg (214 lb 9 6 oz)       Patient with a history of diastolic heart failure 2/2 hypertrophic cardiomyopathy  Patient was on Lasix 20 mg daily at home  Clinically euvolemic  Echo on 05/26 showed new systolic dysfunction EF of 30%, diffuse hypokinesis, also severe concentric hypertrophy  of the LV    New LV hypokynesis most likely 2/2 tachicardia  - continue metoprolol tartrate 150 mg bid  - continue lisinopril 20 mg daily  - continue home Lasix 20 mg daily  - patient has ICD in place  - outpatient follow-up with electrophysiology and cardiology  - low salt low fat diet is recomended

## 2021-05-26 NOTE — DISCHARGE SUMMARY
Preethi U  66   Discharge- Huber Hoof 1965, 64 y o  female MRN: [id number]  Unit/Bed#: -01 Encounter: [id number]  Primary Care Provider: Basilia Sin MD   Date and time admitted to hospital: 5/24/2021  2:12 PM    * Atrial fibrillation with rapid ventricular response [facility])  Assessment & Plan  History of atrial fibrillation/flutter , s/p ablation  With more frequent episodes of AFib recently  Came in with heart rate 150 beats per minute  EKG with AFib/a flutter with RVR  Now with new onset systolic cardiomyopathy tachisystoly innduced  Home regimen included  metoprolol tartrate 150 mg b i d   at home, was previously also prescribed verapamil 120 mg but reported not taking and pharmacy reached out did not refill since Dec     TSH reviewed in epic within normal limits  - discharge with metoprolol tartrate 150 mg b i d   -  amiodarone 200 mg tid,  for 7 days then 200mg bid, then 200 mg qd   - follow up with cardiologist, electrophysiologist        Chronic combined systolic and diastolic congestive heart failure (HCC)  Assessment & Plan  Wt Readings from Last 3 Encounters:   05/26/21 97 6 kg (215 lb 2 7 oz)   05/15/21 96 2 kg (212 lb)   03/11/21 97 3 kg (214 lb 9 6 oz)       Patient with a history of diastolic heart failure 2/2 hypertrophic cardiomyopathy  Patient was on Lasix 20 mg daily at home  Clinically euvolemic  Echo on 05/26 showed new systolic dysfunction EF of 30%, diffuse hypokinesis, also severe concentric hypertrophy  of the LV    New LV hypokynesis most likely 2/2 tachicardia  - continue metoprolol tartrate 150 mg bid  - continue lisinopril 20 mg daily  - continue home Lasix 20 mg daily  - patient has ICD in place  - outpatient follow-up with electrophysiology and cardiology  - low salt low fat diet is recomended      Stage 3 chronic kidney disease [facility])  Assessment & Plan  Lab Results   Component Value Date    EGFR 34 05/26/2021    EGFR 35 05/25/2021    EGFR 35 05/24/2021    CREATININE 1 66 (H) 05/26/2021    CREATININE 1 63 (H) 05/25/2021    CREATININE 1 62 (H) 05/24/2021   Baseline creatinine 1 5- 1 7  - follow with PCP    Elevated troponin  Assessment & Plan  Non MI troponin elevation secondary to CKD, AFib with RVR may be contributing  Baseline troponin fluctuate 0 1-0 2  Troponin trended flat 0 24->0 22->0 24->0 20    Patient presented with severe pressure like chest pain  In setting of atrial fibrillation with RVR  Patient is a smoker, with hypertension, no personal history of MI  Cardiac catheterization in 1/2019 without abnormalities  She follows with cardiologist Dr Isadora Godoy  Patient was supposed to get stress test done as outpatient but never made it  EKG was without STEMI however difficult to  For ischemiain the setting of paced rhythm  Lipid panel from 02/21 within normal limits  She was not on statin  BRITTA was 2-3    Patient was initiated on heparin drip on the night of admission  Patient was seen by cardiologist in the morning  Chest pain improved with heart rate control  Cardiology was on board  Heprin drip was discontinued  No urgent intervention was recomended    - further evaluation for possible CAD as outpatient    Hypertrophic cardiomyopathy [facility])  Assessment & Plan  Echo 5/25/21  showed severe concentric hypertrophy, significant hypertrophy of the LV apex  History of VT  Patient denies hx of sudden cardiac dearth in family  ICD in in places since 2016    - follow-up with cardiologist outpatient    Nicotine dependence  Assessment & Plan  Patient smokes half pack cigarettes daily  Patient is motivated to quit  - nicotine patch 7 mcg    ICD (implantable cardioverter-defibrillator) discharge  Assessment & Plan  ICD placed in lieu of history of V-tach  ICD placed in July 2016 ( MRI conditional)     --Interrogation done during this hospital stay    Gastroesophageal reflux disease without esophagitis  Assessment & Plan  Continue home Protonix  Discharging Resident Physician: Manjula Pineda MD  Attending: No att  providers found  PCP: Claire Denise MD  Admission Date: 5/24/2021  Discharge Date: 05/26/21    Disposition:     Home    Reason for Admission: chest pain, afib with rvr    Consultations During Hospital Stay:  · cardiology    Procedures Performed:     none    Significant Findings / Test Results:     · ECHO 5/25  LEFT VENTRICLE:  Systolic function was moderately to markedly reduced  Ejection fraction was estimated to be 30 %  There was moderate diffuse hypokinesis  There was severe concentric hypertrophy  There was significant hypertrophy of the LV apex      RIGHT VENTRICLE:  The size was normal   Systolic function was reduced      LEFT ATRIUM:  The atrium was dilated  Incidental Findings:   · none    Test Results Pending at Discharge (will require follow up):   · none     Outpatient Tests Requested:  · none    Complications:  none    Hospital Course:     Zulma Jasso is a 64 y o  female patient with hx of hypertrophyc cardiomyopathy, diastolic CHF,atrial fibrillation on Xarelto, VT s/p ICD placement, CKD who originally presented to the hospital on 5/24/2021 due to pressure like chest pain 7/10 since morning of presentation, exacerbated by physical activity  Patient denied SOB, cough, nausea, fever, chills, leg swelling  Patient had pulse of 150 bpm on presentation  BP  was stable  EKG was found with a flutter with RVR  Patient was compliant with metoprolol 150 mg bid  Troponin was elevated at 0 24  Review of chart showed previous troponin elevation at 0 1-0 2 as well as similar presentations with chest pain acompanying afib with tachycardia  Heart rate was controled with metoprolol tartrate 75 qid and additional lopressor prn  Patient had continued chest pain and heparin drip was initiated  over night  Cardiologist was on board  Troponin elevation was attributed to non MI causes and heparin drip was stopped    Patient was initiated on amiodarone  ECO showed new diffuse hypokynesis of LV, EF of 30 %, severe concentric hypertrophy  Patient was stable with HR rate intermittently controled  Patient was discharged on amiodarone 200 mg tid for 7 days, 200 mg bid for 7 days, 200 mg qd and metoprolol tartrate 150 mg bid  Follow up apointment was scheduled with EP and cardiology  Patient was agreeable with the plan  She was stable for more then 24 hours prior to discharge  Condition at Discharge: good     Discharge Day Visit / Exam:     Subjective:  Patient had no complains  She was aware of new medications and follow ups  She asked to give a prescription for nicotine patch, which was provided  Vitals: Blood Pressure: 118/84 (05/26/21 1215)  Pulse: 81 (05/26/21 1215)  Temperature: 98 2 °F (36 8 °C) (05/26/21 1106)  Temp Source: Tympanic (05/26/21 1106)  Respirations: 19 (05/26/21 1106)  Height: 5' 7" (170 2 cm) (05/24/21 1420)  Weight - Scale: 97 6 kg (215 lb 2 7 oz) (05/26/21 0535)  SpO2: 100 % (05/26/21 1106)  Exam:   Physical Exam  Vitals signs reviewed  Constitutional:       General: She is not in acute distress  Appearance: Normal appearance  She is not ill-appearing or toxic-appearing  HENT:      Head: Normocephalic and atraumatic  Nose: Nose normal       Mouth/Throat:      Mouth: Mucous membranes are moist    Eyes:      General: No scleral icterus  Extraocular Movements: Extraocular movements intact  Conjunctiva/sclera: Conjunctivae normal    Cardiovascular:      Rate and Rhythm: Normal rate  Rhythm irregular  Pulses: Normal pulses  Heart sounds: Normal heart sounds  No murmur  No gallop  Pulmonary:      Effort: Pulmonary effort is normal  No respiratory distress  Breath sounds: Normal breath sounds  No wheezing or rales  Abdominal:      General: Bowel sounds are normal  There is no distension  Tenderness: There is no abdominal tenderness  There is no guarding or rebound  Musculoskeletal:         General: No swelling or tenderness  Skin:     General: Skin is warm  Coloration: Skin is not jaundiced  Findings: No erythema or rash  Neurological:      General: No focal deficit present  Mental Status: She is alert and oriented to person, place, and time  Cranial Nerves: No cranial nerve deficit  Motor: No weakness  Psychiatric:         Mood and Affect: Mood normal          Behavior: Behavior normal          Discharge instructions/Information to patient and family:   See after visit summary for information provided to patient and family  Provisions for Follow-Up Care:  See after visit summary for information related to follow-up care and any pertinent home health orders  Planned Readmission: none     Discharge Medications:  See after visit summary for reconciled discharge medications provided to patient and family        ** Please Note: This note has been constructed using a voice recognition system **

## 2021-05-26 NOTE — ASSESSMENT & PLAN NOTE
ICD placed in lieu of history of V-tach  ICD placed in July 2016 ( MRI conditional)     --Interrogation done during this hospital stay

## 2021-05-26 NOTE — NURSING NOTE
AVS reviewed with pt  Pt verbalizes understanding of all instructions  IV removed  Belongings accounted for  Monitor removed  Pt has no further questions at this time

## 2021-05-26 NOTE — ASSESSMENT & PLAN NOTE
Echo 5/25/21  showed severe concentric hypertrophy, significant hypertrophy of the LV apex  History of VT   Patient denies hx of sudden cardiac dearth in family  ICD in in places since 2016    - follow-up with cardiologist outpatient

## 2021-05-26 NOTE — DISCHARGE INSTRUCTIONS
A-fib (Atrial Fibrillation)   WHAT YOU NEED TO KNOW:   What is atrial fibrillation (A-fib)? A-fib is an irregular heartbeat  It reduces your heart's ability to pump blood through your body  A-fib may come and go, or it may be a long-term condition  A-fib can cause life-threatening blood clots, stroke, or heart failure  It is important to treat and manage a-fib to help prevent these problems  What increases my risk for A-fib? · High blood pressure, heart failure, or heart valve disease    · Smoking    · COPD, sleep apnea, a blood clot in your lung, or other lung diseases    · Diabetes, obesity, or thyroid disease    · Heavy alcohol use or large amounts of caffeine    · Age 72 years or older    · A family history of A-fib or other heart problems    · Certain medicines, such as some antidepressants, bronchodilators, and cancer medicines    What are the signs and symptoms of A-fib? · A heartbeat that races, pounds, or flutters    · Weakness, severe tiredness, or confusion    · Feeling lightheaded, sweaty, dizzy, or faint    · Shortness of breath or anxiety    · Chest pain or pressure    How is A-fib diagnosed? Your healthcare provider will examine you  Tell the provider about your symptoms, health conditions, and medicines  Tell the provider if you drink alcohol, smoke cigarettes, or use any illegal drugs  You will need an EKG to check your heart rhythm and how fast your heart beats  You may also need to wear a Holter monitor at home while you do your usual activities  The Holter monitor is a portable EKG machine  How is A-fib treated? Conditions that cause A-fib, such as thyroid disease, will be treated  You may also need any of the following:  · Heart medicines  help control your heart rate or rhythm  You may need more than one medicine to treat your symptoms  · Antiplatelet or blood thinner medicines  help prevent blood clots and stroke      · Cardioversion  is a procedure to return your heart rate and rhythm to normal  It can be done using medicines or electric shock  · A-fib ablation  is a procedure that uses energy to burn a small area of heart tissue  This creates scar tissue and prevents electrical signals that cause A-fib  You may need this procedure more than once  Ask for more information on A-fib ablation  · A pacemaker  may be inserted into your heart  A pacemaker is a device that controls your heartbeat  A pacemaker may be inserted during an ablation procedure or surgery  Ask your healthcare provider for more information on pacemakers  · Surgery  may be needed if other procedures do not work  During surgery your healthcare provider will make cuts in the upper part of your heart  The provider will stitch the cuts together to create scar tissue  The scar tissue will prevent electrical signals that cause A-fib  How can I manage A-fib? · Know your target heart rate  Learn how to check your pulse and monitor your heart rate  · Know the risks if you choose to drink alcohol  Alcohol can increase your risk for A-fib or make A-fib harder to manage  Ask your healthcare provider if it is okay for you to drink any alcohol  He or she can help you set limits for the number of drinks you have in 24 hours and in a week  A drink of alcohol is 12 ounces of beer, 5 ounces of wine, or 1½ ounces of liquor  · Do not smoke  Nicotine can cause heart damage and make it more difficult to manage your A-fib  Do not use e-cigarettes or smokeless tobacco in place of cigarettes or to help you quit  They still contain nicotine  Ask your healthcare provider for information if you currently smoke and need help quitting  · Eat heart-healthy foods  Heart healthy foods will help keep your cholesterol low  These include fruits, vegetables, whole-grain breads, low-fat dairy products, beans, lean meats, and fish  Replace butter and margarine with heart-healthy oils such as olive oil and canola oil  · Maintain a healthy weight  Ask your healthcare provider what a healthy weight is for you  Ask him or her to help you create a safe weight loss plan if you are overweight  Even a small goal of a 10% weight loss can improve your heart health  · Get regular physical activity  Physical activity helps improve your heart health  Get at least 150 minutes of moderate aerobic physical activity each week  Your healthcare provider can help you create an activity plan  · Manage other health conditions  This includes high blood pressure or cholesterol, sleep apnea, diabetes, and other heart conditions  Take medicine as directed and follow your treatment plan  Your healthcare provider may need to change a medicine you are taking if it is causing your A-fib  Do not  stop taking any medicine unless directed by your provider  Call your local emergency number [phone] in the [address] [facility]) or have someone call if:   · You have any of the following signs of a heart attack:      ? Squeezing, pressure, or pain in your chest    ? You may  also have any of the following:     § Discomfort or pain in your back, neck, jaw, stomach, or arm    § Shortness of breath    § Nausea or vomiting    § Lightheadedness or a sudden cold sweat    · You have any of the following signs of a stroke:      ? Numbness or drooping on one side of your face     ? Weakness in an arm or leg    ? Confusion or difficulty speaking    ? Dizziness, a severe headache, or vision loss    When should I call my doctor? · Your arm or leg feels warm, tender, and painful  It may look swollen and red  · Your heart rate is more than 110 beats per minute  · You have new or worsening swelling in your legs, feet, ankles, or abdomen  · You are short of breath, even at rest     · You have questions or concerns about your condition or care  CARE AGREEMENT:   You have the right to help plan your care  Learn about your health condition and how it may be treated  Discuss treatment options with your healthcare providers to decide what care you want to receive  You always have the right to refuse treatment  The above information is an  only  It is not intended as medical advice for individual conditions or treatments  Talk to your doctor, nurse or pharmacist before following any medical regimen to see if it is safe and effective for you  © Copyright [employer] 2021 Information is for End User's use only and may not be sold, redistributed or otherwise used for commercial purposes  All illustrations and images included in CareNotes® are the copyrighted property of [employer]  or [facility]     A-fib (Atrial Fibrillation)   WHAT YOU NEED TO KNOW:   A-fib may come and go, or it may be a long-term condition  A-fib can cause blood clots, stroke, or heart failure  These conditions may become life-threatening  It is important to treat and manage A-fib to help prevent a blood clot, stroke, or heart failure  DISCHARGE INSTRUCTIONS:   Call your local emergency number (911 in the [address] [place] [address],[address]) or have someone call if:   · You have any of the following signs of a heart attack:      ? Squeezing, pressure, or pain in your chest    ? You may  also have any of the following:     § Discomfort or pain in your back, neck, jaw, stomach, or arm    § Shortness of breath    § Nausea or vomiting    § Lightheadedness or a sudden cold sweat    · You have any of the following signs of a stroke:      ? Numbness or drooping on one side of your face     ? Weakness in an arm or leg    ? Confusion or difficulty speaking    ? Dizziness, a severe headache, or vision loss    Call your doctor or cardiologist if:   · Your arm or leg feels warm, tender, and painful  It may look swollen and red  · Your heart rate is more than 110 beats per minute  · You have new or worsening swelling in your legs, feet, ankles, or abdomen      · You are short of breath, even at rest     · You have questions or concerns about your condition or care  Medicines: You may need any of the following:  · Heart medicines  help control your heart rate or rhythm  You may need more than one medicine to treat your symptoms  · Blood thinners  help prevent blood clots  Clots can cause strokes, heart attacks, and death  The following are general safety guidelines to follow while you are taking a blood thinner:    ? Watch for bleeding and bruising while you take blood thinners  Watch for bleeding from your gums or nose  Watch for blood in your urine and bowel movements  Use a soft washcloth on your skin, and a soft toothbrush to brush your teeth  This can keep your skin and gums from bleeding  If you shave, use an electric shaver  Do not play contact sports  ? Tell your dentist and other healthcare providers that you take a blood thinner  Wear a bracelet or necklace that says you take this medicine  ? Do not start or stop any other medicines unless your healthcare provider tells you to  Many medicines cannot be used with blood thinners  ? Take your blood thinner exactly as prescribed by your healthcare provider  Do not skip does or take less than prescribed  Tell your provider right away if you forget to take your blood thinner, or if you take too much  ? Warfarin  is a blood thinner that you may need to take  The following are things you should be aware of if you take warfarin:     § Foods and medicines can affect the amount of warfarin in your blood  Do not make major changes to your diet while you take warfarin  Warfarin works best when you eat about the same amount of vitamin K every day  Vitamin K is found in green leafy vegetables and certain other foods  Ask for more information about what to eat when you are taking warfarin  § You will need to see your healthcare provider for follow-up visits when you are on warfarin  You will need regular blood tests   These tests are used to decide how much medicine you need  · Antiplatelets , such as aspirin, help prevent blood clots  Take your antiplatelet medicine exactly as directed  These medicines make it more likely for you to bleed or bruise  If you are told to take aspirin, do not take acetaminophen or ibuprofen instead  · Take your medicine as directed  Contact your healthcare provider if you think your medicine is not helping or if you have side effects  Tell him or her if you are allergic to any medicine  Keep a list of the medicines, vitamins, and herbs you take  Include the amounts, and when and why you take them  Bring the list or the pill bottles to follow-up visits  Carry your medicine list with you in case of an emergency  Manage A-fib:   · Know your target heart rate  Learn how to check your pulse and monitor your heart rate  · Know the risks if you choose to drink alcohol  Alcohol can increase your risk for A-fib or make A-fib harder to manage  Ask your healthcare provider if it is okay for you to drink any alcohol  He or she can help you set limits for the number of drinks you have in 24 hours and in a week  A drink of alcohol is 12 ounces of beer, 5 ounces of wine, or 1½ ounces of liquor  · Do not smoke  Nicotine can cause heart damage and make it more difficult to manage your A-fib  Do not use e-cigarettes or smokeless tobacco in place of cigarettes or to help you quit  They still contain nicotine  Ask your healthcare provider for information if you currently smoke and need help quitting  · Eat heart-healthy foods  Heart healthy foods will help keep your cholesterol low  These include fruits, vegetables, whole-grain breads, low-fat dairy products, beans, lean meats, and fish  Replace butter and margarine with heart-healthy oils such as olive oil and canola oil  · Maintain a healthy weight  Ask your healthcare provider what a healthy weight is for you   Ask him or her to help you create a safe weight loss plan if you are overweight  Even a small goal of a 10% weight loss can improve your heart health  · Get regular physical activity  Physical activity helps improve your heart health  Get at least 150 minutes of moderate aerobic physical activity each week  Your healthcare provider can help you create an activity plan  · Manage other health conditions  This includes high blood pressure or cholesterol, sleep apnea, diabetes, and other heart conditions  Take medicine as directed and follow your treatment plan  Your healthcare provider may need to change a medicine you are taking if it is causing your A-fib  Do not  stop taking any medicine unless directed by your provider  Follow up with your doctor or cardiologist as directed: You will need regular blood tests and monitoring  Write down your questions so you remember to ask them during your visits  © Copyright [employer] 2021 Information is for End User's use only and may not be sold, redistributed or otherwise used for commercial purposes  All illustrations and images included in CareNotes® are the copyrighted property of [employer] M , Inc  or [employer]   The above information is an  only  It is not intended as medical advice for individual conditions or treatments  Talk to your doctor, nurse or pharmacist before following any medical regimen to see if it is safe and effective for you  How to Stop Smoking   WHAT YOU NEED TO KNOW:   You will improve your health and the health of others around you if you stop smoking  Your risk for heart and lung disease, cancer, stroke, heart attack, and vision problems will also decrease  Your adolescent can help prevent or stop harm to his or her brain or body  This will help him or her become a healthy adult  You can benefit from quitting no matter how long you have smoked  DISCHARGE INSTRUCTIONS:   Prepare to stop smoking:  Nicotine is a highly addictive drug found in cigarettes   Withdrawal symptoms can happen when you stop smoking and make it hard to quit  These include anxiety, depression, irritability, trouble sleeping, and increased appetite  You increase your chances of success if you prepare to quit  · Set a quit date  Erica Hash a date that is within the next 2 weeks  Do not pick a day that you think may be stressful or busy  Write down the day or [place] it on your calender  · Tell friends and family that you plan to quit  Explain that you may have withdrawal symptoms when you try to quit  Ask them to support you  They may be able to encourage you and help reduce your stress to make it easier for you to quit  · Make a list of your reasons for quitting  Put the list somewhere you will see it every day, such as your refrigerator  You can look at the list when you have a craving  · Remove all tobacco and nicotine products from your home, car, and workplace  Also, remove anything else that will tempt you to smoke, such as lighters, matches, or ashtrays  Clean your car, home, and places at work that smell like smoke  The smell of smoke can trigger a craving  · Identify triggers that make you want to smoke  This may include activities, feelings, or people  Also write down 1 way you can deal with each of your triggers  For example, if you want to smoke as soon as you wake up, plan another activity during this time, such as exercise  · Make a plan for how you will quit  Learn about the tools that can help you quit, such as medicine, counseling, or nicotine replacement therapy  Choose at least 2 options to help you quit  · Help your adolescent make a plan to quit  The plan will be more successful if your adolescent makes his or her own decisions  Do not try to pressure him or her to quit immediately or in a certain way  Be supportive and offer help if needed      Tools to help you stop smoking:   · Counseling  from a trained healthcare provider can provide you with support and skills to quit smoking  The provider will also teach you to manage your withdrawal symptoms and cravings  You may receive counseling from one counselor, in group therapy, or through phone therapy called a quit line  · Nicotine replacement therapy (NRT)  such as nicotine patches, gum, or lozenges may help reduce your nicotine cravings  You may get these without a doctor's order  Do not use e-cigarettes or smokeless tobacco in place of cigarettes or to help you quit  They still contain nicotine  · Prescription medicines  such as nasal sprays or nicotine inhalers may help reduce your withdrawal symptoms  Other medicines may also be used to reduce your urge to smoke  Ask your healthcare provider about these medicines  You may need to start certain medicines 2 weeks before your quit date for them to work well  · Hypnosis  is a practice that helps guide you through thoughts and feelings  Hypnosis may help decrease your cravings and make you more willing to quit  · Acupuncture therapy  uses very thin needles to balance energy channels in the body  This is thought to help decrease cravings and symptoms of nicotine withdrawal     · Support groups  let you talk to others who are trying to quit or have already quit  It may be helpful to speak with others about how they quit  Manage your cravings:   · Avoid situations, people, and places that tempt you to smoke  Go to nonsmoking places, such as libraries or restaurants  Understand what tempts you and try to avoid these things  · Keep your hands busy  Hold things such as a stress ball or pen  · Put candy or toothpicks in your mouth  Keep lollipops, sugarless gum, or toothpicks with you at all times  · Do not have alcohol or caffeine  These drinks may tempt you to smoke  Drink healthy liquids such as water or juice instead  · Reward yourself when you resist your cravings  Rewards will motivate you and help you stay positive      · Do an activity that distracts you from your craving  Examples include cleaning, creating art, or gardening  Prevent weight gain after you quit:  You may gain a few pounds after you quit smoking  It is healthier for you to gain a few pounds than to continue to smoke  The following can help you prevent weight gain:  · Eat healthy foods  These include fruits, vegetables, whole-grain breads, low-fat dairy products, beans, lean meats, and fish  Eat healthy snacks, such as low-fat yogurt, if you get hungry between meals  · Drink water before, during, and between meals  This will make your stomach feel full and help prevent you from overeating  Ask your healthcare provider how much liquid to drink each day and which liquids are best for you  · Be physically active  Activity may help reduce your cravings and reduce stress  Take a walk or do some kind of physical activity every day  Ask your healthcare provider which activities are right for you  For support and more information:   · [employer]  Phone: 6- 207 - 963-9448  Web Address: www [facility] Information is for End User's use only and may not be sold, redistributed or otherwise used for commercial purposes  All illustrations and images included in CareNotes® are the copyrighted property of A D A M , Inc  or [facility] [employer]   The above information is an  only  It is not intended as medical advice for individual conditions or treatments  Talk to your doctor, nurse or pharmacist before following any medical regimen to see if it is safe and effective for you

## 2021-05-26 NOTE — ASSESSMENT & PLAN NOTE
Lab Results   Component Value Date    EGFR 34 05/26/2021    EGFR 35 05/25/2021    EGFR 35 05/24/2021    CREATININE 1 66 (H) 05/26/2021    CREATININE 1 63 (H) 05/25/2021    CREATININE 1 62 (H) 05/24/2021   Baseline creatinine 1 5- 1 7  - follow with PCP

## 2021-05-26 NOTE — ASSESSMENT & PLAN NOTE
History of atrial fibrillation/flutter , s/p ablation  With more frequent episodes of AFib recently  Came in with heart rate 150 beats per minute  EKG with AFib/a flutter with RVR  Now with new onset systolic cardiomyopathy tachisystoly innduced  Home regimen included  metoprolol tartrate 150 mg b i d   at home, was previously also prescribed verapamil 120 mg but reported not taking and pharmacy reached out did not refill since Dec     [facility] within normal limits      - discharge with metoprolol tartrate 150 mg b i d   -  amiodarone 200 mg tid,  for 7 days then 200mg bid, then 200 mg qd   - follow up with cardiologist, electrophysiologist

## 2021-05-26 NOTE — PROGRESS NOTES
Progress Note - Cardiology   Rufina Jiang 64 y o  female MRN: [id number]  Unit/Bed#: -01 Encounter: [id number]    Assessment:  Nonobstructive HCM  PAF with prior ablation  Now with increased episodes of afib/flutter  Symptomatic in that setting, and some decrease in LV function (suspect tachymyopathy)  Non MI troponin elevation  Plan:  Discussed with her - she was not taking verapamil previously, and currently would actually hold off with decrease in LV function, could add this in the future  Increase amiodarone dosing to load 3x/day x 1 week, 2x/day x1 week, then daily  Likely amiodarone will not be long term therapy, but in the short term would be most effective in suppressing her atrial arrhythmias  There was a cardiac cath done in 2019, no CAD  No need for a repeat ischemic eval because her symptoms are related to the arrhythmia  Xarelto continues  Metoprolol dose was increased, do twice daily dosing upon discharge  Continue 20mg lisinopril    Started on 20 mg lasix, volume status appears stable  She is intermittently tachycardic, and will continue to be as the amiodarone takes effect  She is very comfortable currently, even when tachycardic (although does report symptoms)  She is eager to go home  I am ok with discharge, I arranged EP follow up for her  Will also message the HCM clinic to schedule her for follow up  Subjective/Objective     Subjective:     Patient feels ok, anxious to go home  She has been paroxysmal in afib  When she is in afib (as she is currently), she feels palpitations and headache  When back in sinus/paced rhythm, she feels back to normal     Reviewed her device interrogation, which showed more episodes of afib  Her echo was personally reviewed  EF was reported at 30%, but I think this is affected by her tachycardia  There was no LVOT gradient, but the septum was makedly increased in thickness      Objective:    Vitals: /89 (BP Location: Left arm)   Pulse 84   Temp 98 1 °F (36 7 °C) (Tympanic)   Resp 18   Ht 5' 7" (1 702 m)   Wt 97 6 kg (215 lb 2 7 oz)   SpO2 97%   BMI 33 70 kg/m²   Vitals:    05/24/21 1420 05/26/21 0535   Weight: 96 5 kg (212 lb 11 9 oz) 97 6 kg (215 lb 2 7 oz)     Orthostatic Blood Pressures      Most Recent Value   Blood Pressure  125/89 filed at 05/26/2021 2351   Patient Position - Orthostatic VS  Lying filed at 05/26/2021 5646          Intake/Output Summary (Last 24 hours) at 5/26/2021 8208  Last data filed at 5/25/2021 2000  Gross per 24 hour   Intake 390 ml   Output --   Net 390 ml     Physical Exam:   General appearance: alert and in no acute distress  Head: Normocephalic, without obvious abnormality, atraumatic  Neck: no carotid bruit, no JVD and supple, symmetrical, trachea midline  Lungs: clear to auscultation bilaterally  Normal air entry  Normal effort  Heart: S1, S2 tachycardic, irregularly irregular  Abdomen: soft, non-tender; bowel sounds normal; no masses,  no organomegaly  Extremities: extremities normal, atraumatic, no cyanosis or edema  Pulses: 2+ and symmetric bilaterally  Skin: Skin color, texture, turgor normal  No rashes or lesions  Neurologic: Grossly normal  Alert and oriented      Medications:    Current Facility-Administered Medications:     acetaminophen (TYLENOL) tablet 650 mg, 650 mg, Oral, Q6H PRN, Femi Hopkins MD, 650 mg at 05/26/21 0732    amiodarone tablet 200 mg, 200 mg, Oral, TID With Meals, Sanjiv Underwood MD    aspirin (ECOTRIN LOW STRENGTH) EC tablet 81 mg, 81 mg, Oral, Daily, Emeli Almeida MD, 81 mg at 05/25/21 0223    furosemide (LASIX) tablet 20 mg, 20 mg, Oral, Daily, Femi Hopkins MD, 20 mg at 05/25/21 0820    lisinopril (ZESTRIL) tablet 20 mg, 20 mg, Oral, Daily, Femi Hopkins MD, 20 mg at 05/25/21 0819    metoprolol (LOPRESSOR) injection 5 mg, 5 mg, Intravenous, Q4H PRN, Femi Hopkins MD, 5 mg at 05/26/21 0739    metoprolol tartrate (LOPRESSOR) tablet 75 mg, 75 mg, Oral, 4x Daily, Nadia Diaz MD, 75 mg at 05/25/21 2100    morphine injection 2 mg, 2 mg, Intravenous, Q6H PRN, Nadia Diaz MD, 2 mg at 05/26/21 0302    nicotine (NICODERM CQ) 7 mg/24hr TD 24 hr patch 1 patch, 1 patch, Transdermal, Daily, Nadia Diaz MD, 1 patch at 05/25/21 1545    nystatin (MYCOSTATIN) powder, , Topical, BID, Richar So MD, Given at 05/25/21 1813    pantoprazole (PROTONIX) EC tablet 40 mg, 40 mg, Oral, Early Morning, Nadia Diaz MD, 40 mg at 05/26/21 1600    rivaroxaban (XARELTO) tablet 20 mg, 20 mg, Oral, Daily With Breakfast, Rian Ayala MD, 20 mg at 05/26/21 2068    Lab Results:  Results from last 7 days   Lab Units 05/25/21  0827 05/25/21  0127 05/24/21  2218   TROPONIN I ng/mL 0 22* 0 20* 0 24*     Results from last 7 days   Lab Units 05/25/21  0631 05/24/21  1429   WBC Thousand/uL 5 20 8 25   HEMOGLOBIN g/dL 13 2 14 1   HEMATOCRIT % 40 9 43 4   PLATELETS Thousands/uL 141* 195         Results from last 7 days   Lab Units 05/26/21  0535 05/25/21  0631 05/24/21  1429   SODIUM mmol/L 137 140 143   POTASSIUM mmol/L 4 1 3 8 3 8   CHLORIDE mmol/L 103 104 106   CO2 mmol/L 27 29 29   BUN mg/dL 25* 23* 13   CREATININE mg/dL 1 66* 1 63* 1 62*   CALCIUM mg/dL 9 0 9 2 9 8   ALK PHOS U/L  --  43 5 53 2   ALT U/L  --  11 13   AST U/L  --  16 18     Results from last 7 days   Lab Units 05/25/21  0827 05/25/21  0159 05/24/21  1429   INR   --  1 07 1 18*   PTT seconds 36 29 32*     Telemetry: Personally reviewed  Paroxysmal in atrial fibrillation, slightly rapid when in afib    Echo 3/8/65:  LEFT VENTRICLE:  Systolic function was moderately to markedly reduced  Ejection fraction was estimated to be 30 %  There was moderate diffuse hypokinesis  There was severe concentric hypertrophy  There was significant hypertrophy of the LV apex  RIGHT VENTRICLE:  The size was normal   Systolic function was reduced  LEFT ATRIUM:  The atrium was dilated

## 2021-05-28 DIAGNOSIS — I48.91 ATRIAL FIBRILLATION, UNSPECIFIED TYPE (HCC): Chronic | ICD-10-CM

## 2021-05-28 RX ORDER — FAMOTIDINE 20 MG/1
TABLET, FILM COATED ORAL
Qty: 2 TABLET | OUTPATIENT
Start: 2021-05-28

## 2021-05-28 RX ORDER — RIVAROXABAN 20 MG/1
TABLET, FILM COATED ORAL
Qty: 30 TABLET | Refills: 1 | Status: SHIPPED | OUTPATIENT
Start: 2021-05-28 | End: 2021-08-01

## 2021-06-07 ENCOUNTER — HOSPITAL ENCOUNTER (EMERGENCY)
Facility: HOSPITAL | Age: 56
Discharge: HOME | End: 2021-06-07
Attending: EMERGENCY MEDICINE | Admitting: EMERGENCY MEDICINE
Payer: COMMERCIAL

## 2021-06-07 ENCOUNTER — APPOINTMENT (EMERGENCY)
Dept: RADIOLOGY | Facility: HOSPITAL | Age: 56
End: 2021-06-07
Payer: COMMERCIAL

## 2021-06-07 VITALS
RESPIRATION RATE: 20 BRPM | DIASTOLIC BLOOD PRESSURE: 107 MMHG | SYSTOLIC BLOOD PRESSURE: 235 MMHG | BODY MASS INDEX: 33.2 KG/M2 | WEIGHT: 212 LBS | OXYGEN SATURATION: 98 % | TEMPERATURE: 97.6 F | HEART RATE: 60 BPM

## 2021-06-07 DIAGNOSIS — M79.641 RIGHT HAND PAIN: Primary | ICD-10-CM

## 2021-06-07 PROCEDURE — 99283 EMERGENCY DEPT VISIT LOW MDM: CPT

## 2021-06-07 PROCEDURE — 99284 EMERGENCY DEPT VISIT MOD MDM: CPT | Performed by: EMERGENCY MEDICINE

## 2021-06-07 PROCEDURE — 29125 APPL SHORT ARM SPLINT STATIC: CPT | Performed by: EMERGENCY MEDICINE

## 2021-06-07 PROCEDURE — 73130 X-RAY EXAM OF HAND: CPT

## 2021-06-07 RX ORDER — ACETAMINOPHEN 325 MG/1
975 TABLET ORAL ONCE
Status: COMPLETED | OUTPATIENT
Start: 2021-06-07 | End: 2021-06-07

## 2021-06-07 RX ADMIN — DICLOFENAC SODIUM TOPICAL GEL, 1%, 2 G: 10 GEL TOPICAL at 20:02

## 2021-06-07 RX ADMIN — ACETAMINOPHEN 975 MG: 325 TABLET, FILM COATED ORAL at 20:01
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
## 2021-06-07 NOTE — Clinical Note
Marva Bernstein was seen and treated in our emergency department on 6/7/2021             no use of right hand till cleared by ortho    Diagnosis:     Es Hartman    She may return on this date: 06/10/2021         If you have any questions or concerns, please don't hesitate to call        Yassine Lemus MD    ______________________________           _______________          _______________  [facility]                              Date                                Time

## 2021-06-07 NOTE — ED NOTES
Blood pressure up in triage  States usually after she gets taken to back it goes down       Oral Sun, JAE  06/07/21 3900

## 2021-06-08 DIAGNOSIS — I48.91 ATRIAL FIBRILLATION WITH RVR (HCC): ICD-10-CM

## 2021-06-09 RX ORDER — METOPROLOL SUCCINATE 100 MG/1
TABLET, EXTENDED RELEASE ORAL
Qty: 135 TABLET | Refills: 3 | Status: SHIPPED | OUTPATIENT
Start: 2021-06-09 | End: 2021-12-20

## 2021-06-10 ENCOUNTER — OFFICE VISIT (OUTPATIENT)
Dept: OBGYN CLINIC | Facility: CLINIC | Age: 56
End: 2021-06-10
Payer: COMMERCIAL

## 2021-06-10 ENCOUNTER — OFFICE VISIT (OUTPATIENT)
Dept: CARDIOLOGY CLINIC | Facility: CLINIC | Age: 56
End: 2021-06-10
Payer: COMMERCIAL

## 2021-06-10 VITALS
SYSTOLIC BLOOD PRESSURE: 184 MMHG | DIASTOLIC BLOOD PRESSURE: 90 MMHG | HEART RATE: 74 BPM | HEIGHT: 68 IN | RESPIRATION RATE: 16 BRPM | WEIGHT: 216.8 LBS | BODY MASS INDEX: 32.86 KG/M2

## 2021-06-10 VITALS
HEART RATE: 60 BPM | BODY MASS INDEX: 33.27 KG/M2 | SYSTOLIC BLOOD PRESSURE: 215 MMHG | WEIGHT: 212 LBS | DIASTOLIC BLOOD PRESSURE: 103 MMHG | HEIGHT: 67 IN

## 2021-06-10 DIAGNOSIS — N18.9 CHRONIC KIDNEY DISEASE, UNSPECIFIED CKD STAGE: ICD-10-CM

## 2021-06-10 DIAGNOSIS — I47.2 VENTRICULAR TACHYCARDIA (HCC): ICD-10-CM

## 2021-06-10 DIAGNOSIS — M65.9 SYNOVITIS OF FINGER: ICD-10-CM

## 2021-06-10 DIAGNOSIS — I42.2 HYPERTROPHIC CARDIOMYOPATHY (HCC): ICD-10-CM

## 2021-06-10 DIAGNOSIS — I49.3 PVC (PREMATURE VENTRICULAR CONTRACTION): ICD-10-CM

## 2021-06-10 DIAGNOSIS — S63.656A SPRAIN OF METACARPOPHALANGEAL (MCP) JOINT OF RIGHT LITTLE FINGER, INITIAL ENCOUNTER: Primary | ICD-10-CM

## 2021-06-10 DIAGNOSIS — S60.051A CONTUSION OF RIGHT LITTLE FINGER WITHOUT DAMAGE TO NAIL, INITIAL ENCOUNTER: ICD-10-CM

## 2021-06-10 DIAGNOSIS — I48.91 ATRIAL FIBRILLATION WITH RAPID VENTRICULAR RESPONSE (HCC): Primary | ICD-10-CM

## 2021-06-10 DIAGNOSIS — I50.42 CHRONIC COMBINED SYSTOLIC AND DIASTOLIC CONGESTIVE HEART FAILURE (HCC): ICD-10-CM

## 2021-06-10 PROCEDURE — 99214 OFFICE O/P EST MOD 30 MIN: CPT | Performed by: PHYSICIAN ASSISTANT

## 2021-06-10 PROCEDURE — 93000 ELECTROCARDIOGRAM COMPLETE: CPT | Performed by: PHYSICIAN ASSISTANT

## 2021-06-10 PROCEDURE — 99214 OFFICE O/P EST MOD 30 MIN: CPT | Performed by: SURGERY

## 2021-06-10 RX ORDER — METHYLPREDNISOLONE 4 MG/1
TABLET ORAL
Qty: 21 TABLET | Refills: 0 | OUTPATIENT
Start: 2021-06-10 | End: 2021-07-25

## 2021-06-10 NOTE — PATIENT INSTRUCTIONS
Please establish care with heart failure (Dr Timur Cortes) given your heart muscle weakening - for now will continue metoprolol and lisinopril for this along with your water pill  For atrial fibrillation and PVC's - please continue to take amiodarone as prescribed  Will need to be "loaded" in your system  Will also get Holter monitor to determine how many PVCs you are having in a 24 hour period      Will also put in referral to Nephrology - Dr Bill Ordaz

## 2021-06-10 NOTE — PROGRESS NOTES
Electrophysiology Hospital Follow Up  [address] Cardiology [facility]  [address] [facility], [place], [address]    Name: Lopez Adamson  : 1965  MRN: [id number]    ASSESSMENT:  1  Atrial fibrillation with rapid ventricular response ([facility] )     2  History of ventricular tachycardia ([facility] ) with ICD     3  Hypertrophic cardiomyopathy ([facility] )     4  Chronic combined systolic and diastolic congestive heart failure ([facility] )         PLAN:  1  Newly reduced EF - tachy mediated? - EF of 30% per echo 2021 - dropped from 60% seen on echo 2020  - cardiac cath 2019 showing no significant epicardial CAD  2  Paroxysmal atrial fibrillation  - anticoagulated with Xarelto / [id number] score of 3 (sex, HTN, CHF)  - EF of 30% per echo 2021 / LA diameter of 4 27cm (relatively unchanged from prior)  - rate control: metoprolol succinate 100mg twice daily   - antiarrhythmic therapy: amiodarone 200mg daily   - prior ablation:  Prior ablation by Dr Jazmine Alicia ?? /  cryoablation of atrial fibrillation with pulmonary vein isolation by Dr Neri Topete on 2020  3  Medtronic dual chamber ICD  - implanted on 2016 due to #4 / secondary prevention  4  Monomorphic ventricular tachycardia   - seen on loop recorder / underwent ICD implantation   - at a rate of 285 beats per minutes  5  Hypertrophic cardiomyopathy  - has not followed with [facility]  - family history of aborted cardiac arrest in mother has ICD  6  Essential hypertension  - maintained on amlodipine 5 mg daily, lisinopril 20 mg daily, and metoprolol succinate  7  Chronic combined systolic and diastolic congestive heart failure  8  SURINDER  - maintained on CPAP  9  Remote history of cocaine use    IMPRESSION:  1  Atrial fibrillation - Hazel Ware has undergone ablation for atrial fibrillation in the past but did present more recently in atrial fibrillation with RVR  Therefore, will continue amiodarone loading for the time being    Would also want to quantify her atrial fibrillation burden and will therefore order a Holter monitor for this as there was concern that PVCs may also be contributing to her overall cardiomyopathy  Agree with continuing amiodarone load at this time to treat these arrhythmias for now  2  CHF - Given recent drop in ejection fraction, will have patient establish care with heart failure  For now, will continue metoprolol and lisinopril with her diuretic  She will not need further ischemic workup as she did undergo cardiac catheterization in 2019 showing no significant epicardial CAD  3  Nephrology - She would like to establish with nephrology and will give referral for this  Patient is to follow up in our EP office after her test results  She is to call our office with any further questions or concerns in the meantime  HPI:   Interim history: Rahel Osuna is a 64 y o  female with paroxysmal atrial fibrillation anticoagulated with Xarelto, monomorphic ventricular tachycardia status post ICD, hypertrophic cardiomyopathy, essential hypertension, chronic combined systolic and diastolic congestive heart failure, SURINDER compliant with CPAP, and remote history of cocaine use  She presents today for visit after hospitalization  She follows with Dr Meg Ma as an outpatient and has carried the diagnosis of atrial fibrillation since at least 2014  She did have a cardioversion and loop recorder implantation in March of 2016 by Dr Bailey Juarez  Even as far back as 2016, she has had history of RVR for which she is highly symptomatic  She reports having undergone an ablation in 2015  In 2016 she had life-threatening VT seen on loop recorder with syncope  It was noted that atrial fibrillation had led to her VT  She presented to the hospital and underwent loop recorder removal and ICD implantation 7/13/2016  Atrially had been added as she did have bradycardia as well      She had been stable until December 2018 at which point she presented back to the hospital in atrial fibrillation with RVR and elevated troponin  She did have a prior nuclear stress test that was abnormal and no records from [facility] of her prior cardiac catheterization, it was recommended she underwent repeat carry a catheterization in January 2019 that showed no obstructive CAD  In September of that same year, she did have worsening palpitations for which when she was in the hospital she did have PVC seen on telemetry  She also had paroxysms of atrial fibrillation around this time seen on her device  Eventually, she was seen again by Dr Neri Topete virtually in March of 2020 to further discuss her atrial fibrillation  It was felt that some of her episodes of RVR or possibly cocaine related with her U tox being positive  Recommendation for atrial fibrillation ablation was given after 6 weeks to determine if she could be compliant with anticoagulation  Unfortunately, she was back in the hospital less than a week later due to RVR and had been placed on IV amiodarone for this which had converted back to sinus  Therefore her ablation was expedited and she underwent pulmonary vein isolation by Dr Neri Topete on 5/20/2020  Amiodarone continue to be loaded in the outpatient setting afterwards  Interestingly, in follow-up a couple months later after her ablation amiodarone had been discontinued  She had been attempted to be stab which with the hypertrophic cardiomyopathy Clinic but has not shown for her appointments  More recently, at the end of May, she had another hospitalization for atrial fibrillation with RVR at which point a repeat echo was obtained that showed an ejection fraction of 30% which was down from 60%  As she did convert to sinus rhythm while in hospital, she was sent home and it was recommended that she have close follow-up with EP      Patient reports that she is more short of breath and can not feel palpitations more frequently  Rhythm History:  1  Diagnosed with atrial fibrillation around 2014  2  CV and loop implantation 3/2016  3  VT seen on loop 7/2016 / underwent ICD implantation  4  Hospitalization for RVR 12/2018 (cath next month showed no obstructive CAD)  5  Increasing burden and hospitalizations leading to re-establishing with SS  6  Underwent AFib ablation 05/2020 - started on amiodarone (only on for a couple months)  7  Recent hospitalization 5/2021 with RVR - EF of 30% now  8  Restarted on amiodarone     EKG: Atrial bigeminy at 74 beats per minute    ROS:   Review of Systems   Constitutional: Positive for malaise/fatigue  Negative for chills and fever  Cardiovascular: Positive for palpitations  Negative for chest pain, claudication, dyspnea on exertion, irregular heartbeat, leg swelling, near-syncope, orthopnea, paroxysmal nocturnal dyspnea and syncope  Respiratory: Positive for shortness of breath  Negative for sleep disturbances due to breathing  All other systems reviewed and are negative  OBJECTIVE:   Vitals:   BP (!) 184/90   Pulse 74   Resp 16   Ht 5' 7 5" (1 715 m)   Wt 98 3 kg (216 lb 12 8 oz)   BMI 33 45 kg/m²       Physical Exam:   Physical Exam  Vitals and nursing note reviewed  Constitutional:       General: She is not in acute distress  Appearance: She is well-developed  She is not diaphoretic  HENT:      Head: Normocephalic and atraumatic  Eyes:      Pupils: Pupils are equal, round, and reactive to light  Neck:      Vascular: No JVD  Cardiovascular:      Rate and Rhythm: Normal rate  Rhythm irregular  Heart sounds: No murmur heard  No friction rub  No gallop  Pulmonary:      Effort: Pulmonary effort is normal  No respiratory distress  Breath sounds: Normal breath sounds  No wheezing  Abdominal:      Palpations: Abdomen is soft  Musculoskeletal:         General: Normal range of motion  Cervical back: Normal range of motion     Skin:     General: Skin is warm and dry  Neurological:      Mental Status: She is alert and oriented to person, place, and time  Medications:      Current Outpatient Medications:     albuterol (PROVENTIL HFA,VENTOLIN HFA) 90 mcg/act inhaler, Inhale 1-2 puffs every 6 (six) hours as needed for wheezing, Disp: 1 Inhaler, Rfl: 0    amiodarone 200 mg tablet, Take 1 tablet 3 times a day for 7 days  Then 1 tablet twice a day for 7 days   Then 1 tablet every day, Disp: 60 tablet, Rfl: 0    EPINEPHrine (EPIPEN) 0 3 mg/0 3 mL SOAJ, Inject 0 3 mL (0 3 mg total) into a muscle once for 1 dose For severe allergic reaction, Disp: 1 each, Rfl: 0    furosemide (LASIX) 20 mg tablet, Take 1 tablet (20 mg total) by mouth daily, Disp: 30 tablet, Rfl: 5    lisinopril (ZESTRIL) 20 mg tablet, Take 1 tablet (20 mg total) by mouth daily, Disp: 30 tablet, Rfl: 0    metoprolol succinate (TOPROL-XL) 100 mg 24 hr tablet, TAKE 1 AND 1/2 TABLETS 2 TIMES A DAY, Disp: 135 tablet, Rfl: 3    nicotine (NICODERM CQ) 7 mg/24hr TD 24 hr patch, Place 1 patch on the skin daily, Disp: 28 patch, Rfl: 0    nystatin (MYCOSTATIN) powder, Apply topically 2 (two) times a day, Disp: 15 g, Rfl: 0    omeprazole (PriLOSEC) 20 mg delayed release capsule, Take 1 capsule (20 mg total) by mouth daily for 14 days, Disp: 14 capsule, Rfl: 0    Xarelto 20 MG tablet, take 1 tablet by mouth every evening, Disp: 30 tablet, Rfl: 1     Family History   Problem Relation Age of Onset    Arthritis Family     Cancer Family     Diabetes Family     Hypertension Family     Cancer Maternal Grandmother      Social History     Socioeconomic History    Marital status: /Civil Union     Spouse name: Not on file    Number of children: Not on file    Years of education: 12    Highest education level: Not on file   Occupational History    Not on file   Social Needs    Financial resource strain: Not on file    Food insecurity     Worry: Not on file     Inability: Not on file   Alberto Ralph Transportation needs     Medical: Not on file     Non-medical: Not on file   Tobacco Use    Smoking status: Current Every Day Smoker     Packs/day: 1 00     Years: 35 00     Pack years: 35 00     Types: Cigarettes    Smokeless tobacco: Never Used   Substance and Sexual Activity    Alcohol use: No     Frequency: Never     Binge frequency: Never    Drug use: Yes     Frequency: 1 0 times per week     Types: Marijuana    Sexual activity: Yes     Partners: Male   Lifestyle    Physical activity     Days per week: Not on file     Minutes per session: Not on file    Stress: Not on file   Relationships    Social connections     Talks on phone: Not on file     Gets together: Not on file     Attends [employer] service: Not on file     Active member of club or organization: Not on file     Attends meetings of clubs or organizations: Not on file     Relationship status: Not on file    Intimate partner violence     Fear of current or ex partner: Not on file     Emotionally abused: Not on file     Physically abused: Not on file     Forced sexual activity: Not on file   Other Topics Concern    Not on file   Social History Narrative    Disabled        · Do you currently or have you served in [employer]:   No      · Were you activated, into active duty, as a member of the [employer] or as a Reservist:   No      · Diet:   Regular      · General stress level:   High      · Has smoked since age:   12      · Caffeine intake: Moderate      · Guns present in home:   No      · Seat belts used routinely:   Yes      · Sunscreen used routinely:   No      · Advance directive:   No      · Live alone or with others:   with others      · International travel:   no      · Pets:   No      · Blind or serious difficulty seeing: Yes     · Difficulty concentrating, remembering or making decisions:   No      · Difficulty walking or climbing stairs:    Yes      · Difficulty dressing or bathing:   No      · Difficulty doing errands alone: No      · What is the highest grade or level of school you have completed or the highest degree you have received:   12th grade, no diploma      · How many days of moderate to strenuous exercise, like a brisk walk, did you do in the last 7 days:   0      · How hard is it for you to pay for the very basics like food, housing, medical care, and heating:   Somewhat hard      · Do you feel stress - tense, restless, nervous, or anxious, or unable to sleep at night because your mind is troubled all the time - these days: Only a little          Social History     Tobacco Use   Smoking Status Current Every Day Smoker    Packs/day: 1 00    Years: 35 00    Pack years: 35 00    Types: Cigarettes   Smokeless Tobacco Never Used     Social History     Substance and Sexual Activity   Alcohol Use No    Frequency: Never    Binge frequency: Never       Labs & Results:  Below is the patient's most recent value for Albumin, ALT, AST, BUN, Calcium, Chloride, Cholesterol, CO2, Creatinine, GFR, Glucose, HDL, Hematocrit, Hemoglobin, Hemoglobin A1C, LDL, Magnesium, Phosphorus, Platelets, Potassium, PSA, Sodium, Triglycerides, and WBC  Lab Results   Component Value Date    ALT 11 05/25/2021    AST 16 05/25/2021    BUN 25 (H) 05/26/2021    CALCIUM 9 0 05/26/2021     05/26/2021    CO2 27 05/26/2021    CREATININE 1 66 (H) 05/26/2021    HDL 42 (L) 02/07/2021    HCT 42 5 05/26/2021    HGB 13 5 05/26/2021    HGBA1C 5 4 09/16/2020    MG 1 9 03/11/2021    PHOS 3 8 05/14/2020     (L) 05/26/2021    K 4 1 05/26/2021    TRIG 80 9 02/07/2021    WBC 5 44 05/26/2021     Note: for a comprehensive list of the patient's lab results, access the Results Review activity      CARDIAC TESTING:   ECHO:   Results for orders placed during the hospital encounter of 05/24/21   Echo complete with contrast if indicated    Narrative [facility] [address]    Transthoracic Echocardiogram  2D, M-mode, Doppler, and Color Doppler    Study date:  25-May-2021    Patient: Ruby Ho  MR number: [id number]  Account number: [de-identified]  : 1965  Age: 64 years  Gender: Female  Status: Inpatient  Location: [facility]  Height: 67 in  Weight: 212 lb  BP: 118/ 62 mmHg    Indications: Afib    Diagnoses: I48 0 - Atrial fibrillation    Sonographer:  PRISCILLA Galo  Referring Physician:  Alyssa Parker MD  Group:  [facility]  Interpreting Physician:  Kristina Benjamin MD    SUMMARY    LEFT VENTRICLE:  Systolic function was moderately to markedly reduced  Ejection fraction was estimated to be 30 %  There was moderate diffuse hypokinesis  There was severe concentric hypertrophy  There was significant hypertrophy of the LV apex  RIGHT VENTRICLE:  The size was normal   Systolic function was reduced  LEFT ATRIUM:  The atrium was dilated  HISTORY: PRIOR HISTORY: Cocaine abuse, Smoker, CKD III, Congestive heart failure  Hypertrophic cardiomyopathy  Atrial fibrillation  Risk factors: hypercholesterolemia  PRIOR PROCEDURES: ICD implantation  Arrhythmia ablation  PROCEDURE: The study was performed in the [facility]  This was a routine study  The transthoracic approach was used  The study included complete 2D imaging, M-mode, complete spectral Doppler, and color Doppler  The heart rate was 138  bpm, at the start of the study  Images were obtained from the parasternal, apical, subcostal, and suprasternal notch acoustic windows  Intravenous contrast (  6 mL Definity in NSS) was administered  Echocardiographic views were limited due  to poor acoustic window availability and lung interference  This was a technically difficult study  LEFT VENTRICLE: Size was normal  Systolic function was moderately to markedly reduced  Ejection fraction was estimated to be 30 %  There was moderate diffuse hypokinesis  Wall thickness was markedly increased   There was severe concentric  hypertrophy  There was significant hypertrophy of the LV apex  DOPPLER: Due to tachycardia, there was fusion of early and atrial contributions to ventricular filling  The study was not technically sufficient to allow evaluation of LV  diastolic function  RIGHT VENTRICLE: The size was normal  Systolic function was reduced  Wall thickness was normal  A pacing wire was present  LEFT ATRIUM: The atrium was dilated  RIGHT ATRIUM: Size was normal  A pacing wire was present  MITRAL VALVE: Valve structure was normal  There was normal leaflet separation  DOPPLER: The transmitral velocity was within the normal range  There was no evidence for stenosis  There was trace regurgitation  AORTIC VALVE: The valve was trileaflet  Leaflets exhibited normal thickness and normal cuspal separation  DOPPLER: Transaortic velocity was within the normal range  There was no evidence for stenosis  There was no significant  regurgitation  TRICUSPID VALVE: The valve structure was normal  There was normal leaflet separation  DOPPLER: The transtricuspid velocity was within the normal range  There was no evidence for stenosis  There was trace regurgitation  Pulmonary artery  systolic pressure was within the normal range  Estimated peak PA pressure was 21 mmHg  PULMONIC VALVE: Leaflets exhibited normal thickness, no calcification, and normal cuspal separation  DOPPLER: The transpulmonic velocity was within the normal range  There was trace regurgitation  PERICARDIUM: There was no pericardial effusion  The pericardium was normal in appearance  AORTA: The root exhibited normal size  SYSTEMIC VEINS: IVC: The inferior vena cava was normal in size   Respirophasic changes were normal     SYSTEM MEASUREMENT TABLES    2D  %FS: 27 49 %  Ao Diam: 2 77 cm  EDV(Teich): 57 94 ml  EF(Teich): 54 26 %  ESV(Teich): 26 5 ml  IVSd: 2 46 cm  LA Area: 26 06 cm2  LA Diam: 4 27 cm  LVIDd: 3 7 cm  LVIDs: 2 68 cm  LVPWd: 1 79 cm  RA Area: 18 49 cm2  RVIDd: 3 01 cm  RWT: 0 97  SV(Teich): 31 44 ml    CW  TR Vmax: 2 14 m/s  TR maxP 28 mmHg    MM  TAPSE: 1 51 cm    [facility] Accredited Echocardiography Laboratory    Prepared and electronically signed by    Kristy Forbes MD  Signed [id number] 14:44:53       Results for orders placed during the hospital encounter of 20   HUBER    Narrative [address]  [facility], [address] [facility]  [phone]    Transesophageal Echocardiogram  2D, Doppler, and Color Doppler    Study date:  20-May-2020    Patient: Viki Jc  MR number: [id number]  Account number: [de-identified]  : 1965  Age: 54 years  Gender: Female  Status: Outpatient  Location: Cath lab  Height: 67 in  Weight: 221 lb  BP:    Indications: AFIB    Diagnoses: I48 0 - Atrial fibrillation    Sonographer:  Carmel Sandifer, RCS  Interpreting Physician:  Dung Boswell MD  Primary Physician:  Vickie Benavides MD  Referring Physician:  Dung Boswell MD  Group:  [facility]    SUMMARY    LEFT VENTRICLE:  Systolic function was normal  Ejection fraction was estimated to be 60 %  Wall thickness was moderately increased  There was moderate concentric hypertrophy  LEFT ATRIUM:  The atrium was mildly dilated  LEFT ATRIAL APPENDAGE:  The size was normal   The function was normal (normal emptying velocity)  No thrombus was identified  ATRIAL SEPTUM:  No defect or patent foramen ovale was identified  HISTORY: PRIOR HISTORY: AFIB, PPM, MI, HOCM, HTN, HLD, CKD III, Smoker, Cocaine abuse    PROCEDURE: The procedure was performed in the catheterization laboratory  This was a routine study  The risks and alternatives of the procedure were explained to the patient and informed consent was obtained  The transesophageal approach  was used  The study included complete 2D imaging, complete spectral Doppler, and color Doppler   An adult omniplane probe was inserted by the attending cardiologist  Intubated with ease  One intubation attempt(s)  There was no blood  detected on the probe  Image quality was adequate  MEDICATIONS: Anesthesia  LEFT VENTRICLE: Size was normal  Systolic function was normal  Ejection fraction was estimated to be 60 %  Wall thickness was moderately increased  There was moderate concentric hypertrophy  RIGHT VENTRICLE: The size was normal  Systolic function was normal  Wall thickness was normal  A pacing wire was present  LEFT ATRIUM: The atrium was mildly dilated  No mass was present  There was no spontaneous echo contrast ("smoke")  APPENDAGE: The size was normal  No thrombus was identified  DOPPLER: The function was normal (normal emptying velocity)  ATRIAL SEPTUM: No defect or patent foramen ovale was identified  RIGHT ATRIUM: Size was normal  No thrombus was identified  MITRAL VALVE: Valve structure was normal  There was normal leaflet separation  There was no echocardiographic evidence of vegetation  DOPPLER: There was no regurgitation  AORTIC VALVE: The valve was trileaflet  Leaflets exhibited normal thickness and normal cuspal separation  There was no echocardiographic evidence of vegetation  DOPPLER: There was no regurgitation  TRICUSPID VALVE: The valve structure was normal  There was normal leaflet separation  There was no echocardiographic evidence of vegetation  DOPPLER: There was no regurgitation  PERICARDIUM: There was no pericardial effusion  The pericardium was normal in appearance  [address] Accredited Echocardiography Laboratory    Prepared and electronically signed by    Rachele Arvizu MD  Signed [id number] 09:25:57         CATH:  No results found for this or any previous visit  STRESS TEST:  No results found for this or any previous visit
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
## 2021-06-14 ENCOUNTER — HOSPITAL ENCOUNTER (OUTPATIENT)
Dept: NON INVASIVE DIAGNOSTICS | Facility: HOSPITAL | Age: 56
Discharge: HOME | End: 2021-06-14
Payer: COMMERCIAL

## 2021-06-14 DIAGNOSIS — I48.91 ATRIAL FIBRILLATION WITH RAPID VENTRICULAR RESPONSE (HCC): ICD-10-CM

## 2021-06-14 DIAGNOSIS — I49.3 PVC (PREMATURE VENTRICULAR CONTRACTION): ICD-10-CM

## 2021-06-14 PROCEDURE — 93225 XTRNL ECG REC<48 HRS REC: CPT

## 2021-06-14 PROCEDURE — 93226 XTRNL ECG REC<48 HR SCAN A/R: CPT

## 2021-06-17 NOTE — PROGRESS NOTES
NEPHROLOGY OFFICE VISIT   Guillermo Hinkle 64 y o  female MRN: [id number]  6/18/2021    Reason for Visit:  Hospital follow-up     INTERVAL HISTORY and SUBJECTIVE:    I had the pleasure of seeing Jan Sharma today in the renal clinic for the continued management of CKD and hospital follow-up  She was previously seen by Dr Kp Kumar in the outpatient clinic August 2020 via tele med visit  Jan Sharma was recently hospitalized at [facility] due to chest pain, atrial fibrillation/flutter with RVR  She has a history of hypertrophic cardiomyopathy, diastolic CHF, atrial fibrillation, VT status post ICD placement in CKD  Echocardiogram showed new diffuse hypokinesis, ejection fraction 30%, severe concentric hypertrophy  Reports having a very poor appetite with weight loss  She has early satiety, chalky taste in her mouth at times and some nausea and vomiting  She lost 70 lb  She has had some feeling of chest pain which she describes as twinges  She has no lower extremity edema  No significant shortness of breath  Follows a low-salt diet  She checks her blood pressure at home and sometimes it is very high  She has periods of diaphoresis  Last blood work was obtained at hospital discharge    ASSESSMENT AND PLAN:  1  Chronic kidney disease:  · Nephrologist:  Dr Kp Kumar  · Last creatinine which was obtained at hospital discharge 1 66  Baseline creatinine 1 5-1 7  · Last urinalysis January 2021:  Trace protein, no RBCs  · Prior CT imaging showed nonobstructing calculus, no hydronephrosis  · Etiology:  Likely hypertension, long-term smoking which can cause a nodular glomerular sclerosis  Previous use of cocaine  · Plan/recommendations:  · Follow-up blood work with urine protein creatinine ratio before next appointment  · Will schedule Jan Sharma for blood work in a week or 2 to check on renal function and electrolytes  · Patient instructed to avoid NSAIDs which she has been doing      · Good blood pressure control essential   Instructed to monitor blood pressure at home  2  Atrial fibrillation with RVR:    · Prior ablation  evaluated by Cardiology during recent hospitalization  · Will need EP follow-up for evaluation in possible alternative anti arrhythmics  · On tapering dose of amiodarone  3  Chronic diastolic CHF:    · Hypertrophic cardiomyopathy  · Echocardiogram ejection fraction previously 60% now down to 30%; noted to have decline in LV function which was felt to be likely tachy related due to atrial fib with RVR  No evidence of obstructive/ischemic disease  4  Hypertension:  · On lisinopril 20 mg daily, metoprolol 150 mg twice a day, furosemide 20 mg daily  · Blood pressure uncontrolled  · Plan/recommendations:  · Instructed to start amlodipine 5 mg daily  After 5 days start monitoring home blood pressure readings and call the office with results  5  Volume status:  Appears euvolemic  6  Electrolytes:  Potassium acceptable during hospitalization  7  Substance abuse:  History of drug use, tobacco abuse  8  Nephrolithiasis:  Prior CT stone imaging January 2021:  Right renal calculus, nonobstructive, no hydronephrosis bilaterally        PATIENT INSTRUCTIONS:    Patient Instructions   1  Start amlodipine 5 mg daily  2  Take BP after 5-7 days  3  Low salt diet  4  No NSAIDs such as Motrin Aleve ibuprofen and Advil  5  Please call the office with the results of blood pressure readings after a week  6  Call with any questions or concerns  7  Exercise as tolerated  8  Try taking a diet supplements such as boost or Ensure     Obtain home blood pressure readings as follows:  · In the morning please take blood pressure reading before medications  Please sit quietly at least 5 minutes before taking blood pressure reading  Make sure your arm is supported at heart level  · In the evening please take blood pressure reading between 7-10 p m  prior to any evening medications and at least an hour after eating dinner    Sit quietly for at least 5 minutes  · Document readings twice a day for 1 week prior to office visit   · Document heart rate along with blood pressure reading   · General instructions:    · Make sure your sitting comfortably with back supported and both feet on the floor  Do not cross your legs  Do not talk during blood pressure measurement  Avoid coffee or caffeine at least 30 minutes prior to measurement  Do not take blood pressure measurement within 30 minutes of exercising  Do not smoke cigarettes  · If you are taking a reading while standing make sure your arm is supported at heart level and not dangling at your side  · Make sure the cuff is properly positioned above the crease at your elbow joint-   Check  guidelines          Orders Placed This Encounter   Procedures    CBC     Standing Status:   Future     Standing Expiration Date:   11/1/2021    Magnesium     Standing Status:   Future     Standing Expiration Date:   11/1/2021    Urinalysis with microscopic     Standing Status:   Future     Standing Expiration Date:   11/1/2021    Protein / creatinine ratio, urine     Standing Status:   Future     Standing Expiration Date:   11/1/2021    Renal function panel     Standing Status:   Future     Standing Expiration Date:   11/1/2021       OBJECTIVE:  Current Weight: Weight - Scale: 95 4 kg (210 lb 6 4 oz)  Vitals:    06/18/21 1345 06/18/21 1424 06/18/21 1441   BP:  (!) 190/98 (!) 184/94   BP Location:  Left arm Right arm   Patient Position:  Sitting Standing   Cuff Size:  Large Large   Pulse:  62    Weight: 95 4 kg (210 lb 6 4 oz)     Height: 5' 7 5" (1 715 m)      Body mass index is 32 47 kg/m²  REVIEW OF SYSTEMS:    Review of Systems   Constitutional: Positive for activity change, appetite change, fatigue and unexpected weight change  HENT: Negative for congestion  Eyes: Negative for visual disturbance  Respiratory: Negative for cough and shortness of breath  Cardiovascular: Positive for chest pain (Occasional twinges of pain)  Negative for palpitations and leg swelling  Gastrointestinal: Positive for nausea and vomiting  Negative for abdominal distention, blood in stool, constipation and diarrhea  Has a chalk like taste in her mouth after she burps   Genitourinary: Negative for difficulty urinating and hematuria  Musculoskeletal: Positive for arthralgias  Skin: Negative for rash  Neurological: Positive for dizziness and headaches  Negative for syncope and weakness  Psychiatric/Behavioral: The patient is not nervous/anxious  PHYSICAL EXAM:      Physical Exam  Constitutional:       General: She is not in acute distress  Appearance: She is not toxic-appearing  HENT:      Head: Normocephalic and atraumatic  Nose: No congestion  Eyes:      General: No scleral icterus  Conjunctiva/sclera: Conjunctivae normal    Neck:      Vascular: No carotid bruit  Cardiovascular:      Rate and Rhythm: Normal rate and regular rhythm  Heart sounds: Murmur heard  No gallop  Pulmonary:      Effort: Pulmonary effort is normal  No respiratory distress  Breath sounds: Normal breath sounds  No stridor  No wheezing, rhonchi or rales  Abdominal:      General: Bowel sounds are normal  There is no distension  Palpations: Abdomen is soft  There is no mass  Tenderness: There is no abdominal tenderness  There is no guarding or rebound  Musculoskeletal:      Cervical back: Neck supple  Right lower leg: No edema  Left lower leg: No edema  Skin:     Coloration: Skin is pale  Skin is not jaundiced  Findings: No erythema or rash  Neurological:      Mental Status: She is alert and oriented to person, place, and time  Psychiatric:         Mood and Affect: Mood normal          Behavior: Behavior normal          Thought Content:  Thought content normal          Judgment: Judgment normal          Medications:    Current Outpatient Medications:     albuterol (PROVENTIL HFA,VENTOLIN HFA) 90 mcg/act inhaler, Inhale 1-2 puffs every 6 (six) hours as needed for wheezing, Disp: 1 Inhaler, Rfl: 0    amiodarone 200 mg tablet, Take 1 tablet 3 times a day for 7 days  Then 1 tablet twice a day for 7 days   Then 1 tablet every day, Disp: 60 tablet, Rfl: 0    furosemide (LASIX) 20 mg tablet, Take 1 tablet (20 mg total) by mouth daily, Disp: 30 tablet, Rfl: 5    lisinopril (ZESTRIL) 20 mg tablet, Take 1 tablet (20 mg total) by mouth daily, Disp: 30 tablet, Rfl: 0    metoprolol succinate (TOPROL-XL) 100 mg 24 hr tablet, TAKE 1 AND 1/2 TABLETS 2 TIMES A DAY, Disp: 135 tablet, Rfl: 3    nystatin (MYCOSTATIN) powder, Apply topically 2 (two) times a day, Disp: 15 g, Rfl: 0    omeprazole (PriLOSEC) 20 mg delayed release capsule, Take 1 capsule (20 mg total) by mouth daily for 14 days (Patient taking differently: Take 20 mg by mouth as needed ), Disp: 14 capsule, Rfl: 0    Xarelto 20 MG tablet, take 1 tablet by mouth every evening, Disp: 30 tablet, Rfl: 1    amLODIPine (NORVASC) 5 mg tablet, Take 1 tablet (5 mg total) by mouth daily, Disp: 30 tablet, Rfl: 5    EPINEPHrine (EPIPEN) 0 3 mg/0 3 mL SOAJ, Inject 0 3 mL (0 3 mg total) into a muscle once for 1 dose For severe allergic reaction, Disp: 1 each, Rfl: 0    methylPREDNISolone 4 MG tablet therapy pack, Use as directed on package (Patient not taking: Reported on 6/18/2021), Disp: 21 tablet, Rfl: 0    nicotine (NICODERM CQ) 7 mg/24hr TD 24 hr patch, Place 1 patch on the skin daily (Patient not taking: Reported on 6/10/2021), Disp: 28 patch, Rfl: 0    Laboratory Results:        Invalid input(s): ALBUMIN    Results for orders placed or performed during the hospital encounter of 05/24/21   CBC and differential   Result Value Ref Range    WBC 8 25 4 31 - 10 16 Thousand/uL    RBC 5 06 3 81 - 5 12 Million/uL    Hemoglobin 14 1 11 5 - 15 4 g/dL    Hematocrit 43 4 34 8 - 46 1 %    MCV 86 82 - 98 fL MCH 27 9 26 8 - 34 3 pg    MCHC 32 5 31 4 - 37 4 g/dL    RDW 13 6 11 6 - 15 1 %    MPV 11 4 8 9 - 12 7 fL    Platelets 997 844 - 479 Thousands/uL    Neutrophils Relative 66 43 - 75 %    Immat GRANS % 0 0 - 2 %    Lymphocytes Relative 26 14 - 44 %    Monocytes Relative 7 4 - 12 %    Eosinophils Relative 1 0 - 6 %    Basophils Relative 0 0 - 1 %    Neutrophils Absolute 5 44 1 85 - 7 62 Thousands/µL    Immature Grans Absolute 0 02 0 00 - 0 20 Thousand/uL    Lymphocytes Absolute 2 12 0 60 - 4 47 Thousands/µL    Monocytes Absolute 0 54 0 17 - 1 22 Thousand/µL    Eosinophils Absolute 0 11 0 00 - 0 61 Thousand/µL    Basophils Absolute 0 02 0 00 - 0 10 Thousands/µL   Comprehensive metabolic panel   Result Value Ref Range    Sodium 143 133 - 145 mmol/L    Potassium 3 8 3 5 - 5 0 mmol/L    Chloride 106 96 - 108 mmol/L    CO2 29 22 - 33 mmol/L    ANION GAP 8 4 - 13 mmol/L    BUN 13 6 - 20 mg/dL    Creatinine 1 62 (H) 0 40 - 1 10 mg/dL    Glucose 134 65 - 140 mg/dL    Calcium 9 8 8 4 - 10 2 mg/dL    AST 18 15 - 41 U/L    ALT 13 5 - 54 U/L    Alkaline Phosphatase 53 2 35 - 140 U/L    Total Protein 7 8 6 4 - 8 3 g/dL    Albumin 4 4 3 4 - 4 8 g/dL    Total Bilirubin 0 47 0 30 - 1 20 mg/dL    eGFR 35 ml/min/1 73sq m   Protime-INR   Result Value Ref Range    Protime 13 2 (H) 9 5 - 12 1 seconds    INR 1 18 (H) 0 90 - 1 10   APTT   Result Value Ref Range    PTT 32 (H) 23 - 31 seconds   B-Type Natriuretic Peptide ( & [facility] ONLY)   Result Value Ref Range    BNP 2,706 0 (H) 1 - 100 pg/mL   Troponin I   Result Value Ref Range    Troponin I 0 24 (H) 0 00 - 0 07 ng/mL   Troponin I   Result Value Ref Range    Troponin I 0 20 (H) 0 00 - 0 07 ng/mL   Troponin I   Result Value Ref Range    Troponin I 0 20 (H) 0 00 - 0 07 ng/mL   Troponin I   Result Value Ref Range    Troponin I 0 24 (H) 0 00 - 0 07 ng/mL   APTT   Result Value Ref Range    PTT 29 23 - 31 seconds   Protime-INR   Result Value Ref Range    Protime 12 1 9 5 - 12 1 seconds INR 1 07 0 90 - 1 10   CBC and differential   Result Value Ref Range    WBC 5 20 4  31 - 10 16 Thousand/uL    RBC 4 76 3 81 - 5 12 Million/uL    Hemoglobin 13 2 11 5 - 15 4 g/dL    Hematocrit 40 9 34 8 - 46 1 %    MCV 86 82 - 98 fL    MCH 27 7 26 8 - 34 3 pg    MCHC 32 3 31 4 - 37 4 g/dL    RDW 13 5 11 6 - 15 1 %    MPV 11 3 8 9 - 12 7 fL    Platelets 455 (L) 032 - 390 Thousands/uL    Neutrophils Relative 62 43 - 75 %    Immat GRANS % 0 0 - 2 %    Lymphocytes Relative 26 14 - 44 %    Monocytes Relative 10 4 - 12 %    Eosinophils Relative 2 0 - 6 %    Basophils Relative 0 0 - 1 %    Neutrophils Absolute 3 17 1 85 - 7 62 Thousands/µL    Immature Grans Absolute 0 01 0 00 - 0 20 Thousand/uL    Lymphocytes Absolute 1 35 0 60 - 4 47 Thousands/µL    Monocytes Absolute 0 54 0 17 - 1 22 Thousand/µL    Eosinophils Absolute 0 11 0 00 - 0 61 Thousand/µL    Basophils Absolute 0 02 0 00 - 0 10 Thousands/µL   Comprehensive metabolic panel   Result Value Ref Range    Sodium 140 133 - 145 mmol/L    Potassium 3 8 3 5 - 5 0 mmol/L    Chloride 104 96 - 108 mmol/L    CO2 29 22 - 33 mmol/L    ANION GAP 7 4 - 13 mmol/L    BUN 23 (H) 6 - 20 mg/dL    Creatinine 1 63 (H) 0 40 - 1 10 mg/dL    Glucose 98 65 - 140 mg/dL    Calcium 9 2 8 4 - 10 2 mg/dL    AST 16 15 - 41 U/L    ALT 11 5 - 54 U/L    Alkaline Phosphatase 43 5 35 - 140 U/L    Total Protein 6 5 6 4 - 8 3 g/dL    Albumin 3 7 3 4 - 4 8 g/dL    Total Bilirubin 0 38 0 30 - 1 20 mg/dL    eGFR 35 ml/min/1 73sq m   Troponin I   Result Value Ref Range    Troponin I 0 22 (H) 0 00 - 0 07 ng/mL   APTT six (6) hours after Heparin bolus/drip initiation or dosing change   Result Value Ref Range    PTT 36 23 - 37 seconds   CBC   Result Value Ref Range    WBC 5 44 4 31 - 10 16 Thousand/uL    RBC 4 92 3 81 - 5 12 Million/uL    Hemoglobin 13 5 11 5 - 15 4 g/dL    Hematocrit 42 5 34 8 - 46 1 %    MCV 86 82 - 98 fL    MCH 27 4 26 8 - 34 3 pg    MCHC 31 8 31 4 - 37 4 g/dL    RDW 13 6 11 6 - 15 1 % Platelets 986 (L) 114 - 390 Thousands/uL    MPV 11 7 8 9 - 12 7 fL   Basic metabolic panel   Result Value Ref Range    Sodium 137 133 - 145 mmol/L    Potassium 4 1 3 5 - 5 0 mmol/L    Chloride 103 96 - 108 mmol/L    CO2 27 22 - 33 mmol/L    ANION GAP 7 4 - 13 mmol/L    BUN 25 (H) 6 - 20 mg/dL    Creatinine 1 66 (H) 0 40 - 1 10 mg/dL    Glucose 105 65 - 140 mg/dL    Calcium 9 0 8 4 - 10 2 mg/dL    eGFR 34 ml/min/1 73sq m   ECG 12 lead   Result Value Ref Range    Ventricular Rate 56 BPM    Atrial Rate 60 BPM    ND Interval 133 ms    QRSD Interval 176 ms    QT Interval 429 ms    QTC Interval 415 ms    P Axis 96 degrees    QRS Axis -57 degrees    T Wave Axis 115 degrees   ECG 12 lead   Result Value Ref Range    Ventricular Rate 132 BPM    Atrial Rate 120 BPM    ND Interval 115 ms    QRSD Interval 158 ms    QT Interval 318 ms    QTC Interval 471 ms    P Axis 104 degrees    QRS Axis -59 degrees    T Wave Axis 109 degrees

## 2021-06-18 ENCOUNTER — OFFICE VISIT (OUTPATIENT)
Dept: NEPHROLOGY | Facility: CLINIC | Age: 56
End: 2021-06-18
Payer: COMMERCIAL

## 2021-06-18 VITALS
DIASTOLIC BLOOD PRESSURE: 94 MMHG | BODY MASS INDEX: 31.89 KG/M2 | HEIGHT: 68 IN | WEIGHT: 210.4 LBS | SYSTOLIC BLOOD PRESSURE: 184 MMHG | HEART RATE: 62 BPM

## 2021-06-18 DIAGNOSIS — Z45.02 ICD (IMPLANTABLE CARDIOVERTER-DEFIBRILLATOR) DISCHARGE: ICD-10-CM

## 2021-06-18 DIAGNOSIS — I10 UNCONTROLLED HYPERTENSION: Primary | ICD-10-CM

## 2021-06-18 DIAGNOSIS — I42.2 HYPERTROPHIC CARDIOMYOPATHY (HCC): ICD-10-CM

## 2021-06-18 DIAGNOSIS — N18.32 STAGE 3B CHRONIC KIDNEY DISEASE (HCC): ICD-10-CM

## 2021-06-18 DIAGNOSIS — I48.91 ATRIAL FIBRILLATION WITH RVR (HCC): ICD-10-CM

## 2021-06-18 DIAGNOSIS — E87.5 HYPERKALEMIA: ICD-10-CM

## 2021-06-18 DIAGNOSIS — I50.42 CHRONIC COMBINED SYSTOLIC AND DIASTOLIC CONGESTIVE HEART FAILURE (HCC): ICD-10-CM

## 2021-06-18 PROCEDURE — 99214 OFFICE O/P EST MOD 30 MIN: CPT | Performed by: NURSE PRACTITIONER

## 2021-06-18 RX ORDER — AMLODIPINE BESYLATE 5 MG/1
5 TABLET ORAL DAILY
Qty: 30 TABLET | Refills: 5 | Status: SHIPPED | OUTPATIENT
Start: 2021-06-18 | End: 2021-08-13 | Stop reason: ALTCHOICE

## 2021-06-18 NOTE — PATIENT INSTRUCTIONS
1  Start amlodipine 5 mg daily  2  Take BP after 5-7 days  3  Low salt diet  4  No NSAIDs such as Motrin Aleve ibuprofen and Advil  5  Please call the office with the results of blood pressure readings after a week  6  Call with any questions or concerns  7  Exercise as tolerated  8  Try taking a diet supplements such as boost or Ensure     Obtain home blood pressure readings as follows:  · In the morning please take blood pressure reading before medications  Please sit quietly at least 5 minutes before taking blood pressure reading  Make sure your arm is supported at heart level  · In the evening please take blood pressure reading between 7-10 p m  prior to any evening medications and at least an hour after eating dinner  Sit quietly for at least 5 minutes  · Document readings twice a day for 1 week prior to office visit   · Document heart rate along with blood pressure reading   · General instructions:    · Make sure your sitting comfortably with back supported and both feet on the floor  Do not cross your legs  Do not talk during blood pressure measurement  Avoid coffee or caffeine at least 30 minutes prior to measurement  Do not take blood pressure measurement within 30 minutes of exercising  Do not smoke cigarettes  · If you are taking a reading while standing make sure your arm is supported at heart level and not dangling at your side    · Make sure the cuff is properly positioned above the crease at your elbow joint-   Check  guidelines

## 2021-06-25 PROCEDURE — 93227 XTRNL ECG REC<48 HR R&I: CPT | Performed by: INTERNAL MEDICINE

## 2021-07-01 ENCOUNTER — TELEPHONE (OUTPATIENT)
Dept: NEPHROLOGY | Facility: CLINIC | Age: 56
End: 2021-07-01

## 2021-07-01 NOTE — TELEPHONE ENCOUNTER
----- Message from [address] [place] [address] sent at 7/1/2021  3:34 PM EDT -----  Please call Enrique Colon and inquire about home BP readings   thank you

## 2021-07-02 NOTE — TELEPHONE ENCOUNTER
Patient currently at work, she said she is doing well not having any issues with her Blood pressure or having any symptoms such as dizziness/ lightheadedness  Per the patient her blood pressures have been around the 170's/90's , I did ask her to call the office when possible with a couple of her readings

## 2021-07-25 ENCOUNTER — HOSPITAL ENCOUNTER (EMERGENCY)
Facility: HOSPITAL | Age: 56
Discharge: HOME | End: 2021-07-25
Attending: SURGERY | Admitting: SURGERY
Payer: COMMERCIAL

## 2021-07-25 ENCOUNTER — APPOINTMENT (EMERGENCY)
Dept: CT IMAGING | Facility: HOSPITAL | Age: 56
End: 2021-07-25
Payer: COMMERCIAL

## 2021-07-25 ENCOUNTER — APPOINTMENT (EMERGENCY)
Dept: RADIOLOGY | Facility: HOSPITAL | Age: 56
End: 2021-07-25
Payer: COMMERCIAL

## 2021-07-25 VITALS
HEART RATE: 117 BPM | RESPIRATION RATE: 16 BRPM | WEIGHT: 217.59 LBS | TEMPERATURE: 98.4 F | OXYGEN SATURATION: 97 % | SYSTOLIC BLOOD PRESSURE: 140 MMHG | BODY MASS INDEX: 33.58 KG/M2 | DIASTOLIC BLOOD PRESSURE: 84 MMHG

## 2021-07-25 DIAGNOSIS — S06.0X0A CONCUSSION WITHOUT LOSS OF CONSCIOUSNESS, INITIAL ENCOUNTER: Primary | ICD-10-CM

## 2021-07-25 PROCEDURE — 84132 ASSAY OF SERUM POTASSIUM: CPT

## 2021-07-25 PROCEDURE — 99284 EMERGENCY DEPT VISIT MOD MDM: CPT | Performed by: SURGERY

## 2021-07-25 PROCEDURE — 82947 ASSAY GLUCOSE BLOOD QUANT: CPT

## 2021-07-25 PROCEDURE — 85014 HEMATOCRIT: CPT

## 2021-07-25 PROCEDURE — 84295 ASSAY OF SERUM SODIUM: CPT

## 2021-07-25 PROCEDURE — 99284 EMERGENCY DEPT VISIT MOD MDM: CPT

## 2021-07-25 PROCEDURE — 82803 BLOOD GASES ANY COMBINATION: CPT

## 2021-07-25 PROCEDURE — 99281 EMR DPT VST MAYX REQ PHY/QHP: CPT | Performed by: EMERGENCY MEDICINE

## 2021-07-25 PROCEDURE — 70450 CT HEAD/BRAIN W/O DYE: CPT

## 2021-07-25 RX ORDER — BUTALBITAL, ACETAMINOPHEN AND CAFFEINE 50; 325; 40 MG/1; MG/1; MG/1
1 TABLET ORAL EVERY 4 HOURS PRN
Qty: 20 TABLET | Refills: 0
Start: 2021-07-25 | End: 2021-08-04

## 2021-07-25 RX ORDER — ACETAMINOPHEN 325 MG/1
975 TABLET ORAL ONCE
Status: DISCONTINUED | OUTPATIENT
Start: 2021-07-25 | End: 2021-07-25

## 2021-07-25 RX ORDER — BUTALBITAL, ACETAMINOPHEN AND CAFFEINE 50; 325; 40 MG/1; MG/1; MG/1
1 TABLET ORAL EVERY 4 HOURS PRN
Status: DISCONTINUED | OUTPATIENT
Start: 2021-07-25 | End: 2021-07-25 | Stop reason: HOSPADM

## 2021-07-25 RX ADMIN — BUTALBITAL, ACETAMINOPHEN AND CAFFEINE 1 TABLET: 50; 325; 40 TABLET ORAL at 19:26
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
## 2021-07-25 NOTE — QUICK NOTE
Cervical Collar Clearance: On exam, the patient had no midline point tenderness or paresthesias/numbness/weakness in the extremities  The patient had full range of motion (was then able to flex, extend, and rotate head laterally) without pain  There were no distracting injuries and the patient was not intoxicated  The patient's cervical spine was cleared clinically by NEXUS criteria  Cervical collar removed at this time       Erasmo Conley PA-C  7/25/2021 6:45 PM

## 2021-07-25 NOTE — DISCHARGE INSTRUCTIONS
Concussion Discharge Instructions    At Home:   Most people feel better within the first 72 hours  However, an isolated concussion typically takes two weeks to heal and recover   Further concussions while symptomatic / recovering from an initial concussion may cause prolonged recovery and worsened symptoms  As such, please limit interactions and activities that would cause increased exposure to another head injury   Allow for uninterrupted sleep  Frequent wake-up checks are not encouraged and may prolong the recovery process   Keep surroundings calm and quiet   Diet:  You may resume your normal diet as tolerated  Some decrease in appetite and/or nausea/vomiting is not uncommon   You may return to your normal activity  However, if symptoms develop or worsen, stop the activity and rest until symptoms have resolved  At Work/Activities:   Return to work in 24 hours if symptoms are improved or unchanged from discharge  Additional rest at home / time off of work (with the previously mentioned guidelines) is recommended if symptoms worsen upon return to work   Exercise: 20-30 minutes of light cardiovascular exercise (walking, stationary bike) is permitted daily  If symptoms worsen with activity, rest for 24 hour prior to additional exercise   No team activities or swimming until cleared by trauma  No return to sports (contact and non-contact) until cleared by trauma   No driving until cleared by trauma   If Physical Therapy or Occupational Therapy has been prescribed, please call on day of discharge to begin therapy as soon as possible  Call the trauma office or return to the ER if you are experiencing:   Severe headaches   Blurry or double vision   Dizziness   Nausea   Vomiting    Please call the trauma office with any questions or concerns regarding the care plan

## 2021-07-26 DIAGNOSIS — I48.91 ATRIAL FIBRILLATION WITH TACHYCARDIC VENTRICULAR RATE (HCC): ICD-10-CM

## 2021-07-26 DIAGNOSIS — I48.91 ATRIAL FIBRILLATION WITH RAPID VENTRICULAR RESPONSE (HCC): ICD-10-CM

## 2021-07-26 RX ORDER — AMIODARONE HYDROCHLORIDE 200 MG/1
TABLET ORAL
Qty: 90 TABLET | Refills: 3 | Status: SHIPPED | OUTPATIENT
Start: 2021-07-26 | End: 2021-08-13 | Stop reason: SDUPTHER

## 2021-07-26 RX ORDER — AMIODARONE HYDROCHLORIDE 200 MG/1
TABLET ORAL
Qty: 60 TABLET | Refills: 0 | Status: SHIPPED | OUTPATIENT
Start: 2021-07-26 | End: 2021-07-26
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
## 2021-07-29 LAB
BASE EXCESS BLDA CALC-SCNC: 6 MMOL/L (ref -2–3)
GLUCOSE SERPL-MCNC: 100 MG/DL (ref 65–140)
HCO3 BLDA-SCNC: 32.3 MMOL/L (ref 24–30)
HCT VFR BLD CALC: 42 % (ref 34.8–46.1)
HGB BLDA-MCNC: 14.3 G/DL (ref 11.5–15.4)
PCO2 BLD: 34 MMOL/L (ref 21–32)
PCO2 BLD: 51.2 MM HG (ref 42–50)
PH BLD: 7.41 [PH] (ref 7.3–7.4)
PO2 BLD: 28 MM HG (ref 35–45)
POTASSIUM BLD-SCNC: 4.3 MMOL/L (ref 3.5–5.3)
SAO2 % BLD FROM PO2: 52 % (ref 60–85)
SODIUM BLD-SCNC: 141 MMOL/L (ref 136–145)
SPECIMEN SOURCE: ABNORMAL

## 2021-08-01 DIAGNOSIS — I48.91 ATRIAL FIBRILLATION, UNSPECIFIED TYPE (HCC): Chronic | ICD-10-CM

## 2021-08-01 RX ORDER — RIVAROXABAN 20 MG/1
TABLET, FILM COATED ORAL
Qty: 30 TABLET | Refills: 1 | Status: SHIPPED | OUTPATIENT
Start: 2021-08-01 | End: 2021-10-05

## 2021-08-13 ENCOUNTER — OFFICE VISIT (OUTPATIENT)
Dept: CARDIOLOGY CLINIC | Facility: CLINIC | Age: 56
End: 2021-08-13
Payer: COMMERCIAL

## 2021-08-13 VITALS
WEIGHT: 203.25 LBS | OXYGEN SATURATION: 96 % | DIASTOLIC BLOOD PRESSURE: 92 MMHG | BODY MASS INDEX: 30.8 KG/M2 | HEART RATE: 60 BPM | HEIGHT: 68 IN | SYSTOLIC BLOOD PRESSURE: 162 MMHG | RESPIRATION RATE: 20 BRPM

## 2021-08-13 DIAGNOSIS — Z95.810 ICD (IMPLANTABLE CARDIOVERTER-DEFIBRILLATOR) IN PLACE: ICD-10-CM

## 2021-08-13 DIAGNOSIS — I48.91 ATRIAL FIBRILLATION WITH RAPID VENTRICULAR RESPONSE (HCC): ICD-10-CM

## 2021-08-13 DIAGNOSIS — I10 HYPERTENSION, UNSPECIFIED TYPE: ICD-10-CM

## 2021-08-13 DIAGNOSIS — I48.91 ATRIAL FIBRILLATION WITH TACHYCARDIC VENTRICULAR RATE (HCC): ICD-10-CM

## 2021-08-13 DIAGNOSIS — I50.20 HEART FAILURE WITH REDUCED EJECTION FRACTION (HCC): Primary | ICD-10-CM

## 2021-08-13 DIAGNOSIS — I47.2 VENTRICULAR TACHYCARDIA (HCC): ICD-10-CM

## 2021-08-13 DIAGNOSIS — I48.0 PAROXYSMAL ATRIAL FIBRILLATION (HCC): ICD-10-CM

## 2021-08-13 PROCEDURE — 99204 OFFICE O/P NEW MOD 45 MIN: CPT | Performed by: INTERNAL MEDICINE

## 2021-08-13 PROCEDURE — 93000 ELECTROCARDIOGRAM COMPLETE: CPT | Performed by: INTERNAL MEDICINE

## 2021-08-13 RX ORDER — TRAMADOL HYDROCHLORIDE 50 MG/1
TABLET ORAL
COMMUNITY
Start: 2021-08-03 | End: 2022-01-30

## 2021-08-13 RX ORDER — SACUBITRIL AND VALSARTAN 49; 51 MG/1; MG/1
1 TABLET, FILM COATED ORAL 2 TIMES DAILY
Qty: 60 TABLET | Refills: 1 | Status: ON HOLD | OUTPATIENT
Start: 2021-08-13 | End: 2021-08-19 | Stop reason: SDUPTHER

## 2021-08-13 RX ORDER — AMIODARONE HYDROCHLORIDE 200 MG/1
TABLET ORAL
Qty: 90 TABLET | Refills: 3 | Status: SHIPPED | OUTPATIENT
Start: 2021-08-13 | End: 2022-02-02 | Stop reason: HOSPADM

## 2021-08-13 NOTE — PATIENT INSTRUCTIONS
Stop lisinopril  Start entresto 49-51mg two times a day  Obtain basic metabolic 2 weeks  BP check in 2 weeks  Daily weights  Salt and fluid restriction

## 2021-08-13 NOTE — PROGRESS NOTES
Heart Failure Consult Note - Skyler Cassidy 64 y o  female MRN: [id number]    @ Encounter: [id number]      Assessment/Plan:    Patient Active Problem List    Diagnosis Date Noted    Thrombocytopenia ([place] [address] ) 03/11/2021    ACS (acute coronary syndrome) ([place] [address] ) 02/07/2021    Hyperkalemia 08/18/2020    Leg swelling 08/18/2020    Atrial fibrillation with RVR ([place] [address] ) 05/14/2020    Cocaine abuse ([place] [address] ) 05/14/2020    Lightheadedness 03/09/2020    Stage 3 chronic kidney disease ([place] [address] ) 03/09/2020    Chronic combined systolic and diastolic congestive heart failure ([place] [address] ) 03/09/2020    Elevated lipase 10/10/2019    Palpitations 09/09/2019    DANIELA (acute kidney injury) ([place] [address] ) 09/09/2019    Elevated troponin 09/09/2019    Hypertrophic cardiomyopathy ([place] [address] ) 01/23/2019    Pre-operative cardiovascular examination 01/23/2019    Nicotine dependence 12/26/2018    NSTEMI (non-ST elevated myocardial infarction) ([place] [address] ) 12/26/2018    ICD (implantable cardioverter-defibrillator) discharge 05/10/2017    History of ventricular tachycardia ([place] [address] ) with ICD 07/13/2016    Chest pain 03/20/2016    Atrial fibrillation with rapid ventricular response ([place] [address] ) 03/20/2016    Hyperlipidemia 03/20/2016    Uncontrolled hypertension 03/20/2016    Gastroesophageal reflux disease without esophagitis 03/20/2016       Heart failure with reduced ejection fraction, Stage C, NYHA III  Etiology: likely tachycardia-mediated  Euvolemic, warm and well perfused    Weight: 203  NT proBNP:  BNP 2706 5/24/24    Studies- personally reviewed by me    24 hour Holter 6/14/21:  Predominantly sinus rhythm, PVC <0 01%  PAC < 1 3%  Likely episodes of afib are of low burden   Symptom of sharp pain correlated with sinus rhythm    Echocardiogram 5/25/21  LVEF:  60%  LVIDd: 3 7cm  RV: normal size, reduced function  MR: trace  PASP: 21 mmHg  RVOT:   Other: severe concentric hypertrophy, significant hypertrophy of LV apex  LVPPWd 1 8cm  IVSd: 2 5cm    C 1/7/2019: diffuse mild luminal irregularities    TTE 9/18/2020: LVEF 60%    Diet:  --2g sodium diet  --2000 ml fluid restriction    Neurohormonal Blockade:  --Beta-Blocker: metoprolol succinate 37 5mg BID  --ACEi, ARB or ARNi: lisinopril 20mg daily  --Aldosterone Receptor Blocker: none  --SGLT2 Inhibitor: none  --Diuretic: furosemide 20mg daily    Sudden Cardiac Death Risk Reduction:  --ICD: MDT dual chamber ICD  Interrogation 5/22/21: AP 84%  0 3%   AT with RVR, afib noted  Normal device function  OptiVol within normal limits    Electrical Resynchronization:  --Candidacy for BiV device: narrow QRS    Advanced Therapies (if appropriate): --Inotrope:  --LVAD/Transplant Candidacy:    Paroxysmal atrial fibrillation  Prior ablation by Dr Blanca Franco of atrial fibrillation with pulmonary vein isolation by Dr Piyush Cervantes on 5/20/2020  AC: xarelto  Rate: metoprolol as above  Rhythm: amiodarone 200mg daily  SURINDER on CPAP x 4-5 years  Monomorphic VT  Seen on loop recorder, s/p ICD implant for secondary prevention  Hypertrophic cardiomyopathy  Family history of aborted cardiac arrest  Hypertension, uncontrolled, need to optimize SVR reduction  Tobacco use, 1/2ppd x since 15 y/o  H/o marijuana use, last time 6 months ago  Remote cocaine use, 2015    Today's Plan:  Stop lisinopril  Start entresto 49-51mg two times a day, to start tomorrow evening  Obtain basic metabolic 2 weeks  BP check in 2 weeks  Daily weights  Salt and fluid restriction  We will continue to optimize medical therapy  Follow up in 2 months      HPI:   64year old female with PMH as above who is referred for heart failure management  She has long standing history of atrial fibrillation with ablations as noted above  Also had life threatening VT seen on loop recorder with syncope in 2016 and underwent secondary prevention ICD placement  Underwent cardiac catheterization in 2019 with no obstructive disease   She also carries diagnosis of hypertrophic cardiomyopathy but has not established in the [facility] clinic  She was last hospitalized 2021 for atrial fibrillation with RVR  She was given another amiodarone load and metoprolol dose was increased  Also started on low dose lasix at that time  Echocardiogram showed reduction in LVEF down to 30%    She reports worsening shortness of breath for 6 months or so  She is short of breath walking short distances  Denies PND, orthopnea, leg swelling  Also with weight loss, poor appetite, and having nausea with food intake x 2 years  She was advised to see GI by PCP but has not done so  She reports mother  at 67 with congestive heart failure, afib and CKD  Mother with cardiac arrest at 76, stroke 45  She reports adherence to medications  Past Medical History:   Diagnosis Date    Arrhythmia     Arthritis     Atrial fibrillation (HCC)     Breast lump     CKD (chronic kidney disease) stage 3, GFR 30-59 ml/min (HCC)     Disease of thyroid gland     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 ([facility] [address] ) 2020    GERD (gastroesophageal reflux disease)     H/O transfusion     Hepatitis C     resolved    Hepatitis C     Hyperlipidemia     Hypertension     Irregular heart beat     Pacemaker     Sleep apnea     no cpap    Tachycardia        Review of Systems   Constitutional: Negative for chills and fever  HENT: Negative for ear pain and sore throat  Eyes: Negative for pain and visual disturbance  Respiratory: Positive for shortness of breath  Negative for cough and chest tightness  Cardiovascular: Negative for chest pain, palpitations and leg swelling  Gastrointestinal: Negative for abdominal distention, abdominal pain and vomiting  Genitourinary: Negative for dysuria and hematuria  Musculoskeletal: Negative for arthralgias and back pain  Skin: Negative for color change and rash  Neurological: Negative for dizziness, seizures, syncope and light-headedness     All other systems reviewed and are negative  Allergies   Allergen Reactions    Coconut Oil - Food Allergy     Iodinated Diagnostic Agents Hives    Tape  [Medical Tape] Hives    Tramadol Hives and Rash           Current Outpatient Medications:     albuterol (PROVENTIL HFA,VENTOLIN HFA) 90 mcg/act inhaler, Inhale 1-2 puffs every 6 (six) hours as needed for wheezing, Disp: 1 Inhaler, Rfl: 0    amiodarone 200 mg tablet, CALL MD VERIFY SIG-PT HAS ALREADY DONE TITRATION-SHOULD THIS BE 1 TABLET ONCE DAILY ???, Disp: 90 tablet, Rfl: 3    furosemide (LASIX) 20 mg tablet, Take 1 tablet (20 mg total) by mouth daily, Disp: 30 tablet, Rfl: 5    lisinopril (ZESTRIL) 20 mg tablet, Take 1 tablet (20 mg total) by mouth daily, Disp: 30 tablet, Rfl: 0    metoprolol succinate (TOPROL-XL) 100 mg 24 hr tablet, TAKE 1 AND 1/2 TABLETS 2 TIMES A DAY, Disp: 135 tablet, Rfl: 3    traMADol (ULTRAM) 50 mg tablet, take 1 tablet by mouth twice a day if needed for MODERATE PAIN ( PAIN SCALE 4-6 ), Disp: , Rfl:     Xarelto 20 MG tablet, take 1 tablet by mouth every evening, Disp: 30 tablet, Rfl: 1    amLODIPine (NORVASC) 5 mg tablet, Take 1 tablet (5 mg total) by mouth daily (Patient not taking: Reported on 8/13/2021), Disp: 30 tablet, Rfl: 5    EPINEPHrine (EPIPEN) 0 3 mg/0 3 mL SOAJ, Inject 0 3 mL (0 3 mg total) into a muscle once for 1 dose For severe allergic reaction (Patient not taking: Reported on 8/13/2021), Disp: 1 each, Rfl: 0    nystatin (MYCOSTATIN) powder, Apply topically 2 (two) times a day, Disp: 15 g, Rfl: 0    omeprazole (PriLOSEC) 20 mg delayed release capsule, Take 1 capsule (20 mg total) by mouth daily for 14 days (Patient taking differently: Take 20 mg by mouth as needed ), Disp: 14 capsule, Rfl: 0    Social History     Socioeconomic History    Marital status: /Civil Union     Spouse name: Not on file    Number of children: Not on file    Years of education: 12    Highest education level: Not on file   Occupational History    Not on file   Tobacco Use    Smoking status: Current Every Day Smoker     Packs/day: 1 00     Years: 35 00     Pack years: 35 00     Types: Cigarettes    Smokeless tobacco: Never Used   Vaping Use    Vaping Use: Never used   Substance and Sexual Activity    Alcohol use: No    Drug use: Yes     Frequency: 1 0 times per week     Types: Marijuana    Sexual activity: Yes     Partners: Male   Other Topics Concern    Not on file   Social History Narrative    Disabled        · Do you currently or have you served in [employer]:   No      · Were you activated, into active duty, as a member of the [employer] or as a Reservist:   No      · Diet:   Regular      · General stress level:   High      · Has smoked since age:   12      · Caffeine intake: Moderate      · Guns present in home:   No      · Seat belts used routinely:   Yes      · Sunscreen used routinely:   No      · Advance directive:   No      · Live alone or with others:   with others      · International travel:   no      · Pets:   No      · Blind or serious difficulty seeing: Yes     · Difficulty concentrating, remembering or making decisions:   No      · Difficulty walking or climbing stairs: Yes      · Difficulty dressing or bathing:   No      · Difficulty doing errands alone:   No      · What is the highest grade or level of school you have completed or the highest degree you have received:   12th grade, no diploma      · How many days of moderate to strenuous exercise, like a brisk walk, did you do in the last 7 days:   0      · How hard is it for you to pay for the very basics like food, housing, medical care, and heating:   Somewhat hard      · Do you feel stress - tense, restless, nervous, or anxious, or unable to sleep at night because your mind is troubled all the time - these days:    Only a little          Social Determinants of Health     Financial Resource Strain:     Difficulty of Paying Living Expenses:    Food Insecurity:     Worried About Running Out of Food in the Last Year:     [address] in the Last Year:    Transportation Needs:     Lack of Transportation (Medical):  Lack of Transportation (Non-Medical):    Physical Activity:     Days of Exercise per Week:     Minutes of Exercise per Session:    Stress:     Feeling of Stress :    Social Connections:     Frequency of Communication with Friends and Family:     Frequency of Social Gatherings with Friends and Family:     Attends [employer] Services:     Active Member of Clubs or Organizations:     Attends Club or Organization Meetings:     Marital Status:    Intimate Partner Violence:     Fear of Current or Ex-Partner:     Emotionally Abused:     Physically Abused:     Sexually Abused:        Family History   Problem Relation Age of Onset    Arthritis Family     Cancer Family     Diabetes Family     Hypertension Family     Cancer Maternal Grandmother        Physical Exam:    Vitals: Blood pressure 162/92, pulse 60, resp  rate 20, height 5' 7 5" (1 715 m), weight 92 2 kg (203 lb 4 oz), SpO2 96 %, not currently breastfeeding , Body mass index is 31 36 kg/m² ,   Wt Readings from Last 3 Encounters:   08/13/21 92 2 kg (203 lb 4 oz)   07/25/21 98 7 kg (217 lb 9 5 oz)   06/18/21 95 4 kg (210 lb 6 4 oz)       Physical Exam  Constitutional:       General: She is not in acute distress  Appearance: Normal appearance  HENT:      Head: Normocephalic and atraumatic  Mouth/Throat:      Mouth: Mucous membranes are moist    Eyes:      Extraocular Movements: Extraocular movements intact  Conjunctiva/sclera: Conjunctivae normal    Cardiovascular:      Rate and Rhythm: Normal rate and regular rhythm  Pulses: Normal pulses  Heart sounds: No murmur heard  No friction rub  No gallop  Comments: Nonelevated JVP  Pulmonary:      Effort: Pulmonary effort is normal  No respiratory distress  Breath sounds:  No wheezing, rhonchi or rales    Abdominal:      General: There is no distension  Palpations: Abdomen is soft  Tenderness: There is no abdominal tenderness  There is no guarding  Musculoskeletal:         General: No deformity or signs of injury  Cervical back: Neck supple  Right lower leg: No edema  Left lower leg: No edema  Skin:     General: Skin is warm and dry  Capillary Refill: Capillary refill takes less than 2 seconds  Neurological:      General: No focal deficit present  Mental Status: She is alert and oriented to person, place, and time  Psychiatric:         Mood and Affect: Mood normal          Labs & Results:    Lab Results   Component Value Date    WBC 5 44 05/26/2021    HGB 14 3 07/25/2021    HCT 42 07/25/2021    MCV 86 05/26/2021     (L) 05/26/2021     Lab Results   Component Value Date    SODIUM 137 05/26/2021    K 4 1 05/26/2021     05/26/2021    CO2 34 (H) 07/25/2021    BUN 25 (H) 05/26/2021    CREATININE 1 66 (H) 05/26/2021    GLUC 105 05/26/2021    CALCIUM 9 0 05/26/2021     Lab Results   Component Value Date    NTBNP 5,710 (H) 03/10/2021      Lab Results   Component Value Date    CHOLESTEROL 115 02/07/2021    CHOLESTEROL 133 09/17/2020    CHOLESTEROL 113 03/10/2020     Lab Results   Component Value Date    HDL 42 (L) 02/07/2021    HDL 43 09/17/2020    HDL 39 (L) 03/10/2020     Lab Results   Component Value Date    TRIG 80 9 02/07/2021    TRIG 89 09/17/2020    TRIG 52 03/10/2020     Lab Results   Component Value Date    [facility] 73 02/07/2021    [facility] 90 01/07/2019       EKG personally reviewed by Julianna Hunter MD      Counseling / Coordination of Care  Time spent today 40 minutes  Greater than 50% of total time was spent with the patient and / or family counseling and / or coordination of care  We discussed diagnoses, most recent studies and any changes in treatment    Thank you for the opportunity to participate in the care of this patient      Efrain Hector MD Brock MARINO

## 2021-08-15 ENCOUNTER — APPOINTMENT (EMERGENCY)
Dept: CT IMAGING | Facility: HOSPITAL | Age: 56
DRG: 469 | End: 2021-08-15
Payer: COMMERCIAL

## 2021-08-15 ENCOUNTER — HOSPITAL ENCOUNTER (INPATIENT)
Facility: HOSPITAL | Age: 56
LOS: 4 days | Discharge: HOME | DRG: 469 | End: 2021-08-19
Attending: EMERGENCY MEDICINE | Admitting: HOSPITALIST
Payer: COMMERCIAL

## 2021-08-15 DIAGNOSIS — N18.32 STAGE 3B CHRONIC KIDNEY DISEASE (HCC): ICD-10-CM

## 2021-08-15 DIAGNOSIS — N17.9 AKI (ACUTE KIDNEY INJURY) (HCC): ICD-10-CM

## 2021-08-15 DIAGNOSIS — R77.8 ELEVATED TROPONIN: ICD-10-CM

## 2021-08-15 DIAGNOSIS — I12.9 BENIGN HYPERTENSION WITH CKD (CHRONIC KIDNEY DISEASE) STAGE III (HCC): ICD-10-CM

## 2021-08-15 DIAGNOSIS — T63.441A ALLERGIC REACTION TO BEE STING: ICD-10-CM

## 2021-08-15 DIAGNOSIS — N18.30 BENIGN HYPERTENSION WITH CKD (CHRONIC KIDNEY DISEASE) STAGE III (HCC): ICD-10-CM

## 2021-08-15 DIAGNOSIS — M79.89 LEG SWELLING: ICD-10-CM

## 2021-08-15 DIAGNOSIS — R11.2 NAUSEA & VOMITING: ICD-10-CM

## 2021-08-15 DIAGNOSIS — I25.10 CAD (CORONARY ARTERY DISEASE): ICD-10-CM

## 2021-08-15 DIAGNOSIS — R10.816 EPIGASTRIC ABDOMINAL TENDERNESS: ICD-10-CM

## 2021-08-15 DIAGNOSIS — K29.70 GASTRITIS: ICD-10-CM

## 2021-08-15 DIAGNOSIS — R10.816 EPIGASTRIC ABDOMINAL TENDERNESS WITHOUT REBOUND TENDERNESS: ICD-10-CM

## 2021-08-15 DIAGNOSIS — I50.20 HEART FAILURE WITH REDUCED EJECTION FRACTION (HCC): ICD-10-CM

## 2021-08-15 DIAGNOSIS — N12 PYELONEPHRITIS: Primary | ICD-10-CM

## 2021-08-15 PROBLEM — I48.0 PAROXYSMAL A-FIB (HCC): Status: ACTIVE | Noted: 2020-05-14

## 2021-08-15 PROBLEM — R10.9 FLANK PAIN: Status: ACTIVE | Noted: 2021-08-15

## 2021-08-15 LAB
ALBUMIN SERPL BCP-MCNC: 3.9 G/DL (ref 3.5–5)
ALP SERPL-CCNC: 69 U/L (ref 46–116)
ALT SERPL W P-5'-P-CCNC: 21 U/L (ref 12–78)
ANION GAP SERPL CALCULATED.3IONS-SCNC: 10 MMOL/L (ref 4–13)
AST SERPL W P-5'-P-CCNC: 21 U/L (ref 5–45)
BACTERIA UR QL AUTO: ABNORMAL /HPF
BASOPHILS # BLD AUTO: 0.07 THOUSANDS/ΜL (ref 0–0.1)
BASOPHILS NFR BLD AUTO: 1 % (ref 0–1)
BILIRUB SERPL-MCNC: 0.33 MG/DL (ref 0.2–1)
BILIRUB UR QL STRIP: NEGATIVE
BUN SERPL-MCNC: 26 MG/DL (ref 5–25)
CALCIUM SERPL-MCNC: 9.1 MG/DL (ref 8.3–10.1)
CHLORIDE SERPL-SCNC: 102 MMOL/L (ref 100–108)
CLARITY UR: CLEAR
CO2 SERPL-SCNC: 26 MMOL/L (ref 21–32)
COLOR UR: ABNORMAL
CREAT SERPL-MCNC: 1.98 MG/DL (ref 0.6–1.3)
EOSINOPHIL # BLD AUTO: 0.12 THOUSAND/ΜL (ref 0–0.61)
EOSINOPHIL NFR BLD AUTO: 1 % (ref 0–6)
ERYTHROCYTE [DISTWIDTH] IN BLOOD BY AUTOMATED COUNT: 14.1 % (ref 11.6–15.1)
GFR SERPL CREATININE-BSD FRML MDRD: 28 ML/MIN/1.73SQ M
GLUCOSE SERPL-MCNC: 120 MG/DL (ref 65–140)
GLUCOSE UR STRIP-MCNC: NEGATIVE MG/DL
HCT VFR BLD AUTO: 47.4 % (ref 34.8–46.1)
HGB BLD-MCNC: 15.5 G/DL (ref 11.5–15.4)
HGB UR QL STRIP.AUTO: NEGATIVE
IMM GRANULOCYTES # BLD AUTO: 0.04 THOUSAND/UL (ref 0–0.2)
IMM GRANULOCYTES NFR BLD AUTO: 0 % (ref 0–2)
KETONES UR STRIP-MCNC: ABNORMAL MG/DL
LEUKOCYTE ESTERASE UR QL STRIP: ABNORMAL
LIPASE SERPL-CCNC: 367 U/L (ref 73–393)
LYMPHOCYTES # BLD AUTO: 3.03 THOUSANDS/ΜL (ref 0.6–4.47)
LYMPHOCYTES NFR BLD AUTO: 30 % (ref 14–44)
MCH RBC QN AUTO: 28 PG (ref 26.8–34.3)
MCHC RBC AUTO-ENTMCNC: 32.7 G/DL (ref 31.4–37.4)
MCV RBC AUTO: 86 FL (ref 82–98)
MONOCYTES # BLD AUTO: 0.76 THOUSAND/ΜL (ref 0.17–1.22)
MONOCYTES NFR BLD AUTO: 8 % (ref 4–12)
MUCOUS THREADS UR QL AUTO: ABNORMAL
NEUTROPHILS # BLD AUTO: 6.11 THOUSANDS/ΜL (ref 1.85–7.62)
NEUTS SEG NFR BLD AUTO: 60 % (ref 43–75)
NITRITE UR QL STRIP: NEGATIVE
NON-SQ EPI CELLS URNS QL MICRO: ABNORMAL /HPF
NRBC BLD AUTO-RTO: 0 /100 WBCS
PH UR STRIP.AUTO: 5.5 [PH] (ref 4.5–8)
PLATELET # BLD AUTO: 199 THOUSANDS/UL (ref 149–390)
PMV BLD AUTO: 11.1 FL (ref 8.9–12.7)
POTASSIUM SERPL-SCNC: 4.1 MMOL/L (ref 3.5–5.3)
PROT SERPL-MCNC: 7.7 G/DL (ref 6.4–8.2)
PROT UR STRIP-MCNC: ABNORMAL MG/DL
RBC # BLD AUTO: 5.53 MILLION/UL (ref 3.81–5.12)
RBC #/AREA URNS AUTO: ABNORMAL /HPF
SODIUM SERPL-SCNC: 138 MMOL/L (ref 136–145)
SP GR UR STRIP.AUTO: 1.02 (ref 1–1.03)
TROPONIN I SERPL-MCNC: 0.13 NG/ML
UROBILINOGEN UR QL STRIP.AUTO: 0.2 E.U./DL
WBC # BLD AUTO: 10.13 THOUSAND/UL (ref 4.31–10.16)
WBC #/AREA URNS AUTO: ABNORMAL /HPF

## 2021-08-15 PROCEDURE — 93005 ELECTROCARDIOGRAM TRACING: CPT

## 2021-08-15 PROCEDURE — 84484 ASSAY OF TROPONIN QUANT: CPT | Performed by: EMERGENCY MEDICINE

## 2021-08-15 PROCEDURE — G1004 CDSM NDSC: HCPCS

## 2021-08-15 PROCEDURE — 87086 URINE CULTURE/COLONY COUNT: CPT

## 2021-08-15 PROCEDURE — 96374 THER/PROPH/DIAG INJ IV PUSH: CPT

## 2021-08-15 PROCEDURE — 99285 EMERGENCY DEPT VISIT HI MDM: CPT | Performed by: EMERGENCY MEDICINE

## 2021-08-15 PROCEDURE — 96361 HYDRATE IV INFUSION ADD-ON: CPT

## 2021-08-15 PROCEDURE — 80053 COMPREHEN METABOLIC PANEL: CPT | Performed by: EMERGENCY MEDICINE

## 2021-08-15 PROCEDURE — C9113 INJ PANTOPRAZOLE SODIUM, VIA: HCPCS | Performed by: EMERGENCY MEDICINE

## 2021-08-15 PROCEDURE — 83690 ASSAY OF LIPASE: CPT | Performed by: EMERGENCY MEDICINE

## 2021-08-15 PROCEDURE — 74176 CT ABD & PELVIS W/O CONTRAST: CPT

## 2021-08-15 PROCEDURE — 85025 COMPLETE CBC W/AUTO DIFF WBC: CPT | Performed by: EMERGENCY MEDICINE

## 2021-08-15 PROCEDURE — 36415 COLL VENOUS BLD VENIPUNCTURE: CPT

## 2021-08-15 PROCEDURE — 81001 URINALYSIS AUTO W/SCOPE: CPT

## 2021-08-15 PROCEDURE — 99285 EMERGENCY DEPT VISIT HI MDM: CPT

## 2021-08-15 PROCEDURE — 96375 TX/PRO/DX INJ NEW DRUG ADDON: CPT

## 2021-08-15 RX ORDER — HYDROMORPHONE HCL/PF 1 MG/ML
0.5 SYRINGE (ML) INJECTION
Status: DISCONTINUED | OUTPATIENT
Start: 2021-08-15 | End: 2021-08-19

## 2021-08-15 RX ORDER — MAGNESIUM HYDROXIDE/ALUMINUM HYDROXICE/SIMETHICONE 120; 1200; 1200 MG/30ML; MG/30ML; MG/30ML
30 SUSPENSION ORAL ONCE
Status: DISCONTINUED | OUTPATIENT
Start: 2021-08-15 | End: 2021-08-19 | Stop reason: HOSPADM

## 2021-08-15 RX ORDER — ALBUTEROL SULFATE 90 UG/1
2 AEROSOL, METERED RESPIRATORY (INHALATION) EVERY 6 HOURS PRN
Status: DISCONTINUED | OUTPATIENT
Start: 2021-08-15 | End: 2021-08-19 | Stop reason: HOSPADM

## 2021-08-15 RX ORDER — ACETAMINOPHEN 325 MG/1
650 TABLET ORAL EVERY 4 HOURS PRN
Status: DISCONTINUED | OUTPATIENT
Start: 2021-08-15 | End: 2021-08-19 | Stop reason: HOSPADM

## 2021-08-15 RX ORDER — PANTOPRAZOLE SODIUM 40 MG/1
40 INJECTION, POWDER, FOR SOLUTION INTRAVENOUS EVERY 12 HOURS SCHEDULED
Status: DISCONTINUED | OUTPATIENT
Start: 2021-08-16 | End: 2021-08-19 | Stop reason: HOSPADM

## 2021-08-15 RX ORDER — SODIUM CHLORIDE 9 MG/ML
75 INJECTION, SOLUTION INTRAVENOUS CONTINUOUS
Status: DISCONTINUED | OUTPATIENT
Start: 2021-08-15 | End: 2021-08-17

## 2021-08-15 RX ORDER — OXYCODONE HCL 5 MG/5 ML
5 SOLUTION, ORAL ORAL EVERY 4 HOURS PRN
Status: DISCONTINUED | OUTPATIENT
Start: 2021-08-15 | End: 2021-08-19 | Stop reason: HOSPADM

## 2021-08-15 RX ORDER — ONDANSETRON 2 MG/ML
4 INJECTION INTRAMUSCULAR; INTRAVENOUS ONCE
Status: COMPLETED | OUTPATIENT
Start: 2021-08-15 | End: 2021-08-15

## 2021-08-15 RX ORDER — NYSTATIN 100000 [USP'U]/G
POWDER TOPICAL 2 TIMES DAILY
Status: DISCONTINUED | OUTPATIENT
Start: 2021-08-16 | End: 2021-08-19 | Stop reason: HOSPADM

## 2021-08-15 RX ORDER — OXYCODONE HYDROCHLORIDE 10 MG/1
10 TABLET ORAL EVERY 4 HOURS PRN
Status: DISCONTINUED | OUTPATIENT
Start: 2021-08-15 | End: 2021-08-19 | Stop reason: HOSPADM

## 2021-08-15 RX ORDER — ONDANSETRON 2 MG/ML
4 INJECTION INTRAMUSCULAR; INTRAVENOUS EVERY 6 HOURS PRN
Status: DISCONTINUED | OUTPATIENT
Start: 2021-08-15 | End: 2021-08-19 | Stop reason: HOSPADM

## 2021-08-15 RX ORDER — PANTOPRAZOLE SODIUM 40 MG/1
40 INJECTION, POWDER, FOR SOLUTION INTRAVENOUS ONCE
Status: COMPLETED | OUTPATIENT
Start: 2021-08-15 | End: 2021-08-15

## 2021-08-15 RX ORDER — AMIODARONE HYDROCHLORIDE 200 MG/1
200 TABLET ORAL
Status: DISCONTINUED | OUTPATIENT
Start: 2021-08-16 | End: 2021-08-19 | Stop reason: HOSPADM

## 2021-08-15 RX ORDER — HYDROMORPHONE HCL/PF 1 MG/ML
1 SYRINGE (ML) INJECTION ONCE
Status: COMPLETED | OUTPATIENT
Start: 2021-08-15 | End: 2021-08-15

## 2021-08-15 RX ADMIN — HYDROMORPHONE HYDROCHLORIDE 1 MG: 1 INJECTION, SOLUTION INTRAMUSCULAR; INTRAVENOUS; SUBCUTANEOUS at 19:37

## 2021-08-15 RX ADMIN — RIVAROXABAN 20 MG: 20 TABLET, FILM COATED ORAL at 23:16

## 2021-08-15 RX ADMIN — PANTOPRAZOLE SODIUM 40 MG: 40 INJECTION, POWDER, FOR SOLUTION INTRAVENOUS at 19:40

## 2021-08-15 RX ADMIN — CEFTRIAXONE SODIUM 1000 MG: 10 INJECTION, POWDER, FOR SOLUTION INTRAVENOUS at 21:20

## 2021-08-15 RX ADMIN — HYDROMORPHONE HYDROCHLORIDE 0.5 MG: 1 INJECTION, SOLUTION INTRAMUSCULAR; INTRAVENOUS; SUBCUTANEOUS at 23:17

## 2021-08-15 RX ADMIN — ONDANSETRON 4 MG: 2 INJECTION INTRAMUSCULAR; INTRAVENOUS at 19:36

## 2021-08-15 RX ADMIN — SODIUM CHLORIDE 1000 ML: 0.9 INJECTION, SOLUTION INTRAVENOUS at 19:39

## 2021-08-15 RX ADMIN — SODIUM CHLORIDE 100 ML/HR: 0.9 INJECTION, SOLUTION INTRAVENOUS at 23:31

## 2021-08-16 LAB
ALBUMIN SERPL BCP-MCNC: 3.3 G/DL (ref 3.5–5)
ALP SERPL-CCNC: 57 U/L (ref 46–116)
ALT SERPL W P-5'-P-CCNC: 16 U/L (ref 12–78)
ANION GAP SERPL CALCULATED.3IONS-SCNC: 8 MMOL/L (ref 4–13)
AST SERPL W P-5'-P-CCNC: 23 U/L (ref 5–45)
ATRIAL RATE: 59 BPM
BASOPHILS # BLD AUTO: 0.04 THOUSANDS/ΜL (ref 0–0.1)
BASOPHILS NFR BLD AUTO: 1 % (ref 0–1)
BILIRUB SERPL-MCNC: 0.47 MG/DL (ref 0.2–1)
BUN SERPL-MCNC: 22 MG/DL (ref 5–25)
CALCIUM ALBUM COR SERPL-MCNC: 9.6 MG/DL (ref 8.3–10.1)
CALCIUM SERPL-MCNC: 9 MG/DL (ref 8.3–10.1)
CHLORIDE SERPL-SCNC: 106 MMOL/L (ref 100–108)
CO2 SERPL-SCNC: 26 MMOL/L (ref 21–32)
CREAT SERPL-MCNC: 1.88 MG/DL (ref 0.6–1.3)
EOSINOPHIL # BLD AUTO: 0.09 THOUSAND/ΜL (ref 0–0.61)
EOSINOPHIL NFR BLD AUTO: 2 % (ref 0–6)
ERYTHROCYTE [DISTWIDTH] IN BLOOD BY AUTOMATED COUNT: 14.2 % (ref 11.6–15.1)
GFR SERPL CREATININE-BSD FRML MDRD: 29 ML/MIN/1.73SQ M
GLUCOSE SERPL-MCNC: 97 MG/DL (ref 65–140)
HCT VFR BLD AUTO: 44 % (ref 34.8–46.1)
HGB BLD-MCNC: 14 G/DL (ref 11.5–15.4)
IMM GRANULOCYTES # BLD AUTO: 0.01 THOUSAND/UL (ref 0–0.2)
IMM GRANULOCYTES NFR BLD AUTO: 0 % (ref 0–2)
LYMPHOCYTES # BLD AUTO: 1.88 THOUSANDS/ΜL (ref 0.6–4.47)
LYMPHOCYTES NFR BLD AUTO: 31 % (ref 14–44)
MCH RBC QN AUTO: 28 PG (ref 26.8–34.3)
MCHC RBC AUTO-ENTMCNC: 31.8 G/DL (ref 31.4–37.4)
MCV RBC AUTO: 88 FL (ref 82–98)
MONOCYTES # BLD AUTO: 0.43 THOUSAND/ΜL (ref 0.17–1.22)
MONOCYTES NFR BLD AUTO: 7 % (ref 4–12)
NEUTROPHILS # BLD AUTO: 3.69 THOUSANDS/ΜL (ref 1.85–7.62)
NEUTS SEG NFR BLD AUTO: 59 % (ref 43–75)
NRBC BLD AUTO-RTO: 0 /100 WBCS
P AXIS: 45 DEGREES
PLATELET # BLD AUTO: 110 THOUSANDS/UL (ref 149–390)
PMV BLD AUTO: 12 FL (ref 8.9–12.7)
POTASSIUM SERPL-SCNC: 3.9 MMOL/L (ref 3.5–5.3)
PR INTERVAL: 188 MS
PROT SERPL-MCNC: 6.8 G/DL (ref 6.4–8.2)
QRS AXIS: -59 DEGREES
QRSD INTERVAL: 162 MS
QT INTERVAL: 468 MS
QTC INTERVAL: 463 MS
RBC # BLD AUTO: 5 MILLION/UL (ref 3.81–5.12)
SODIUM SERPL-SCNC: 140 MMOL/L (ref 136–145)
T WAVE AXIS: 98 DEGREES
TROPONIN I SERPL-MCNC: 0.07 NG/ML
TROPONIN I SERPL-MCNC: 0.07 NG/ML
VENTRICULAR RATE: 59 BPM
WBC # BLD AUTO: 6.14 THOUSAND/UL (ref 4.31–10.16)

## 2021-08-16 PROCEDURE — 85025 COMPLETE CBC W/AUTO DIFF WBC: CPT | Performed by: FAMILY MEDICINE

## 2021-08-16 PROCEDURE — 84484 ASSAY OF TROPONIN QUANT: CPT | Performed by: INTERNAL MEDICINE

## 2021-08-16 PROCEDURE — 80053 COMPREHEN METABOLIC PANEL: CPT | Performed by: FAMILY MEDICINE

## 2021-08-16 PROCEDURE — 36415 COLL VENOUS BLD VENIPUNCTURE: CPT | Performed by: INTERNAL MEDICINE

## 2021-08-16 PROCEDURE — 99223 1ST HOSP IP/OBS HIGH 75: CPT | Performed by: INTERNAL MEDICINE

## 2021-08-16 PROCEDURE — C9113 INJ PANTOPRAZOLE SODIUM, VIA: HCPCS | Performed by: FAMILY MEDICINE

## 2021-08-16 PROCEDURE — 93010 ELECTROCARDIOGRAM REPORT: CPT | Performed by: INTERNAL MEDICINE

## 2021-08-16 RX ADMIN — PANTOPRAZOLE SODIUM 40 MG: 40 INJECTION, POWDER, FOR SOLUTION INTRAVENOUS at 09:57

## 2021-08-16 RX ADMIN — HYDROMORPHONE HYDROCHLORIDE 0.5 MG: 1 INJECTION, SOLUTION INTRAMUSCULAR; INTRAVENOUS; SUBCUTANEOUS at 17:10

## 2021-08-16 RX ADMIN — AMIODARONE HYDROCHLORIDE 200 MG: 200 TABLET ORAL at 08:17

## 2021-08-16 RX ADMIN — NYSTATIN: 100000 POWDER TOPICAL at 17:10

## 2021-08-16 RX ADMIN — METOPROLOL SUCCINATE 150 MG: 100 TABLET, EXTENDED RELEASE ORAL at 17:08

## 2021-08-16 RX ADMIN — PANTOPRAZOLE SODIUM 40 MG: 40 INJECTION, POWDER, FOR SOLUTION INTRAVENOUS at 20:34

## 2021-08-16 RX ADMIN — OXYCODONE HYDROCHLORIDE 10 MG: 10 TABLET ORAL at 08:17

## 2021-08-16 RX ADMIN — METOPROLOL SUCCINATE 150 MG: 100 TABLET, EXTENDED RELEASE ORAL at 09:57

## 2021-08-16 RX ADMIN — NICOTINE 1 PATCH: 7 PATCH, EXTENDED RELEASE TRANSDERMAL at 09:55

## 2021-08-16 RX ADMIN — HYDROMORPHONE HYDROCHLORIDE 0.5 MG: 1 INJECTION, SOLUTION INTRAMUSCULAR; INTRAVENOUS; SUBCUTANEOUS at 09:58

## 2021-08-16 RX ADMIN — CEFTRIAXONE SODIUM 1000 MG: 10 INJECTION, POWDER, FOR SOLUTION INTRAVENOUS at 20:33

## 2021-08-16 RX ADMIN — OXYCODONE HYDROCHLORIDE 10 MG: 10 TABLET ORAL at 20:34

## 2021-08-16 RX ADMIN — NYSTATIN: 100000 POWDER TOPICAL at 10:09

## 2021-08-16 NOTE — ASSESSMENT & PLAN NOTE
+n/v, +intermittent self reported "coffee ground" emesis x2 weeks (not today), denies melena/bloody stools  H/o cholecystectomy  Lipase nl  LFTs nl  Hb 15 5 (in setting of n/v I suspect may be slightly elevated due to hemoconcentration)  2/2 PUD vs Gastritis vs MSK vs less likely choledocholithiasis / biliary sludge  Unlikely pancreatitis given normal lipase  Plan:  · NPO, sips with meds  · IV fluids at maintenance  · Fecal occult blood  If negative will restart po diet  · Given no coffee-ground emesis over last couple days despite continuing Xarelto which he takes for AFib will continue for now  If fecal occult blood positive will hold Xarelto and consult GI   · IV Protonix 40 mg b i d  For now  If fecal occult blood positive and increasing suspicion of upper GI bleed/episode of coffee-ground emesis while in hospital will start IV Protonix infusion and consult GI   · CBC in am, monitor Hb

## 2021-08-16 NOTE — ASSESSMENT & PLAN NOTE
This is a 51-year-old female with worsening LUQ abd pain and left-sided flank pain over past 2 weeks  Urine microscopy = + leukocytes, + moderate bacteria  No RBCs  CT abdomen = left-sided perinephric stranding  Given 1 dose of Rocephin in ED    2/2 pyelonephritis vs MSK vs unlikely nephrolithiasis/urolithiasis vs PUD (LUQ abd pain) vs combination of aforementioned     Plan:  · Continue Rocephin, narrow per Urine C&S   · Monitor temperature, heart rate, blood pressure  · Tylenol p r n  The patient states this has not helped  · Pain regimen:  Oxy 5 mg Q 4 p r n  For moderate pain, Oxy 10 mg Q 4 p r n  For severe pain, Dilaudid 0 5 mg for breakthrough  · IV fluids at maintenance  Monitor I&Os, daily weights, volume status

## 2021-08-16 NOTE — ASSESSMENT & PLAN NOTE
Wt Readings from Last 3 Encounters:   08/15/21 92 1 kg (203 lb)   08/13/21 92 2 kg (203 lb 4 oz)   07/25/21 98 7 kg (217 lb 9 5 oz)     On Lasix 20 mg daily  Follows with cardiology Dr Leila Barrientos and Dr Lester [facility])  No signs of volume overload at this time  Dry weight as of currently as  203 lb  Plan:  Hold Lasix given DANIELA  Is getting IV fluids at maintenance  Monitor volume status, daily I&O, daily weights  If evidence of volume overload give Lasix  Restart Lasix once creatinine at baseline

## 2021-08-16 NOTE — UTILIZATION REVIEW
Inpatient Admission Authorization Request   NOTIFICATION OF INPATIENT ADMISSION/INPATIENT AUTHORIZATION REQUEST   SERVICING FACILITY:   [address]  Tax ID: [id number]  NPI: [id number]  Place of Service: Inpatient [address] [facility]  [address]  Place of Service Code: 24     ATTENDING PROVIDER:  Attending Name and NPI#: Katherin Grady Md [[id number]]  Address: [address]  Phone: [phone]     UTILIZATION REVIEW CONTACT:  [facility] Utilization   Network Utilization Review Department  Phone: [phone]  Fax: [phone]  Email: [email or web address]     PHYSICIAN ADVISORY SERVICES:  FOR PLLL-VO-WFIL REVIEW - MEDICAL NECESSITY DENIAL  Phone: [phone]  Fax: [phone]  Email: [email or web address]     TYPE OF REQUEST:  Inpatient Status     ADMISSION INFORMATION:  ADMISSION DATE/TIME: 8/15/21  9:20 PM  PATIENT DIAGNOSIS CODE/DESCRIPTION:  Gastritis [K29 70]  CAD (coronary artery disease) [I25 10]  Pyelonephritis [N12]  Nausea & vomiting [R11 2]  Elevated troponin [R77 8]  Flank pain [R10 9]  DISCHARGE DATE/TIME: No discharge date for patient encounter  DISCHARGE DISPOSITION (IF DISCHARGED): Home/Self Care     IMPORTANT INFORMATION:  Please contact the [facility] directly with any questions or concerns regarding this request  Department voicemails are confidential     Send requests for admission clinical reviews, concurrent reviews, approvals, and administrative denials due to lack of clinical to fax [phone]

## 2021-08-16 NOTE — ASSESSMENT & PLAN NOTE
Lab Results   Component Value Date    EGFR 29 08/16/2021    EGFR 28 08/15/2021    EGFR 34 05/26/2021    CREATININE 1 88 (H) 08/16/2021    CREATININE 1 98 (H) 08/15/2021    CREATININE 1 66 (H) 05/26/2021     Follows with Nephrology - Dr Nadine Suresh and Laura Mccartney  Baseline creatinine 1 5-1 7  On admission creatinine is 1 98, last creatinine in 5/2021 was 1 66  Plan:  · Hold Lasix and Entresto  Will restart once creatinine at baseline    · Will use hydralazine 5 mg p r n  for SBP > 160   · IV fluid at maintenance  · Trend creatinine

## 2021-08-16 NOTE — ASSESSMENT & PLAN NOTE
+n/v, +intermittent self reported "coffee ground" emesis x2 weeks (not today), denies melena/bloody stools  H/o cholecystectomy  Lipase nl  LFTs nl  Hb 15 5 (in setting of n/v I suspect may be slightly elevated due to hemoconcentration)  2/2 PUD vs Gastritis vs MSK vs less likely choledocholithiasis / biliary sludge  Unlikely pancreatitis given normal lipase  Plan:  · Diet advanced  · IV fluids were bolused and d/c  · Fecal occult blood  · Given no coffee-ground emesis over last couple days, d/c Xarelto which he takes for AFib if stable restart 08/17  IV Protonix 40 mg b i d  For now  If fecal occult blood positive and increasing suspicion of upper GI bleed/episode of coffee-ground emesis while in hospital will start IV Protonix infusion and consult GI   · CBC in am, monitor Hb

## 2021-08-16 NOTE — ASSESSMENT & PLAN NOTE
Seems to always have some degree of troponin elevation  Today troponin 0 13 though does lower than prior  No new EKG changes    Plan:  · Telemetry for now  · Following troponin  If not increasing will discontinue telemetry  If increasing will consider consulting Cardiology

## 2021-08-16 NOTE — ASSESSMENT & PLAN NOTE
Follows with cardiology as above  On Xarelto, amiodarone 200 mg daily, metoprolol succinate 100 mg 1 5 tablets b i d  Currently rhythm irregular, rate controlled  Blood pressure 142/89  Plan:  · Continue home medications  · Monitor rate  Monitor blood pressure

## 2021-08-16 NOTE — ASSESSMENT & PLAN NOTE
Wt Readings from Last 3 Encounters:   08/15/21 92 1 kg (203 lb)   08/13/21 92 2 kg (203 lb 4 oz)   07/25/21 98 7 kg (217 lb 9 5 oz)     On Lasix 20 mg daily  Follows with cardiology Dr Tia Hoskins and Dr Vigil Brine [facility])  No signs of volume overload at this time  Dry weight as of currently as  203 lb  Plan:  Hold Lasix given DANIELA  Is getting IV fluids at maintenance  Monitor volume status, daily I&O, daily weights  If evidence of volume overload give Lasix  Restart Lasix once creatinine at baseline

## 2021-08-16 NOTE — ASSESSMENT & PLAN NOTE
Lab Results   Component Value Date    EGFR 28 08/15/2021    EGFR 34 05/26/2021    EGFR 35 05/25/2021    CREATININE 1 98 (H) 08/15/2021    CREATININE 1 66 (H) 05/26/2021    CREATININE 1 63 (H) 05/25/2021     Follows with Nephrology - Dr  Unknown Bone and Reda Less  Baseline creatinine 1 5-1 7  On admission creatinine is 1 98, last creatinine in 5/2021 was 1 66  Plan:  · Hold Lasix and Entresto  Will restart once creatinine at baseline    · Will use hydralazine 5 mg p r n  for SBP > 160   · IV fluid at maintenance  · Trend creatinine Left VM to get PYP set up

## 2021-08-16 NOTE — ASSESSMENT & PLAN NOTE
Seems to always have some degree of troponin elevation  Today troponin 0 13 though does lower than prior  No new EKG changes    Plan:  · Telemetry for now  · Follow-up with troponin  If not increasing will discontinue telemetry  If increasing will consider consulting Cardiology

## 2021-08-16 NOTE — UTILIZATION REVIEW
Initial Clinical Review    Admission: Date/Time/Statement:   Admission Orders (From admission, onward)     Ordered        08/15/21 2120  INPATIENT ADMISSION  Once                   Orders Placed This Encounter   Procedures    INPATIENT ADMISSION     Standing Status:   Standing     Number of Occurrences:   1     Order Specific Question:   Level of Care     Answer:   Med Surg [16]     Order Specific Question:   Estimated length of stay     Answer:   More than 2 Midnights     Order Specific Question:   Certification     Answer:   I certify that inpatient services are medically necessary for this patient for a duration of greater than two midnights  See H&P and MD Progress Notes for additional information about the patient's course of treatment  ED Arrival Information     Expected Arrival Acuity    - 8/15/2021 17:26 Urgent         Means of arrival Escorted by Service Admission type    Wheelchair Family Member Hospitalist Urgent         Arrival complaint    [facility] PAIN        Chief Complaint   Patient presents with    Abdominal Pain     left side abdominal pain started 1 hour ago; N/V last few days       Initial Presentation: 63 y/o female with PMhx of A-fib on Xarelto, PPM/ICD, CHF, HTN, GERD, CKD III who presents to ED from home with c/o left-sided flank pain and LUQ abdominal pain x 2 wks with vomiting yesterday for coffee ground emesis  Rates pain 7/10 on presentation  In ED UA + leukocytes, + moderate bacteria  No RBCs  CT A/P with left-sided perinephric stranding  BUN 26, Cr  1 98  Hgb 15 5, Hct 47 4  Trop 0 03  Received 1L NSS bolus, Rocephin IV, Zofran and IV protonix in ED  Admit inpatient to M/S/Tele unit with Pyelonephritis  -- continue PPI  IVF's   IV Rocephin  Ucx pending  Hold lasix and Entresto  Monitor BMP  Analgesics/antiemetics prn  I/O's  Daily wts  Check stool for occult blood  CBC in AM  Serial trop  EKG   Continue previous home po meds       Date: 8/16  Day 2: Pt with c/o lower abd and back discomfort on L side today  Tachycardic with sat 89% during the night  Continue IV abx  IVF's d/c'd  Advance to reg diet  Continue previous po meds, hold Xarelto and monitor  SCD's  OOB  Daily wts, I/O's  Stool for occult blood pending  Cr 1 88 today, continue to monitor    Trop 0 13--0 07--0 07    ED Triage Vitals   Temperature Pulse Respirations Blood Pressure SpO2   08/15/21 1744 08/15/21 1744 08/15/21 1744 08/15/21 1744 08/15/21 1744   97 9 °F (36 6 °C) 55 18 (!) 179/99 99 %      Temp Source Heart Rate Source Patient Position - Orthostatic VS BP Location FiO2 (%)   08/15/21 1744 08/15/21 1744 08/15/21 1946 08/15/21 1946 --   Oral Monitor Lying Left arm       Pain Score       08/15/21 1744       9          Wt Readings from Last 1 Encounters:   08/15/21 92 1 kg (203 lb)     Additional Vital Signs:   Date/Time  Temp  Pulse  Resp  BP  MAP (mmHg)  SpO2  O2 Device   08/16/21 1455  98 °F (36 7 °C)  64  18  102/65  79  94 %  None (Room air)   08/16/21 1016  --  --  --  --  --  --  None (Room air)   08/16/21 0900  97 8 °F (36 6 °C)  68  16  128/90  103  94 %  None (Room air)   08/16/21 0833  98 2 °F (36 8 °C)  60  16  122/86  98  93 %  None (Room air)   08/16/21 0545  --  98  --  --  --  89 %Abnormal   --   08/16/21 0540  --  112Abnormal   12  101/66  --  92 %  None (Room air)   08/16/21 0200  --  132Abnormal   --  --  --  91 %  None (Room air)   08/15/21 2329  --  58  --  --  --  94 %  --   08/15/21 2130  --  116Abnormal   22  142/89  108  91 %  None (Room air)   08/15/21 1946  --  60  16  130/90  --  94 %  None (Room air)   08/15/21 1744  97 9 °F (36 6 °C)  55  18  179/99Abnormal   --  99 %  None (Room air)       Pertinent Labs/Diagnostic Test Results:   EKG 8/16 -- Sinus bradycardia  Right bundle branch block  Left anterior fascicular block  Bifascicular block   Left ventricular hypertrophy with QRS widening and repolarization abnormality    CT a/p 8/15 -- Interval development of left-sided perinephric stranding which may represent infection           Results from last 7 days   Lab Units 08/16/21  0540 08/15/21  1748   WBC Thousand/uL 6 14 10 13   HEMOGLOBIN g/dL 14 0 15 5*   HEMATOCRIT % 44 0 47 4*   PLATELETS Thousands/uL 110* 199   NEUTROS ABS Thousands/µL 3 69 6 11     Results from last 7 days   Lab Units 08/16/21  0540 08/15/21  1748   SODIUM mmol/L 140 138   POTASSIUM mmol/L 3 9 4 1   CHLORIDE mmol/L 106 102   CO2 mmol/L 26 26   ANION GAP mmol/L 8 10   BUN mg/dL 22 26*   CREATININE mg/dL 1 88* 1 98*   EGFR ml/min/1 73sq m 29 28   CALCIUM mg/dL 9 0 9 1     Results from last 7 days   Lab Units 08/16/21  0540 08/15/21  1748   AST U/L 23 21   ALT U/L 16 21   ALK PHOS U/L 57 69   TOTAL PROTEIN g/dL 6 8 7 7   ALBUMIN g/dL 3 3* 3 9   TOTAL BILIRUBIN mg/dL 0 47 0 33     Results from last 7 days   Lab Units 08/16/21  0540 08/15/21  1748   GLUCOSE RANDOM mg/dL 97 120     Results from last 7 days   Lab Units 08/16/21  0540 08/16/21  0306 08/15/21  1945   TROPONIN I ng/mL 0 07* 0 07* 0 13*     Results from last 7 days   Lab Units 08/15/21  1748   LIPASE u/L 367     Results from last 7 days   Lab Units 08/15/21  2034   CLARITY UA  Clear   COLOR UA  Teresa   SPEC GRAV UA  1 025   PH UA  5 5   GLUCOSE UA mg/dl Negative   KETONES UA mg/dl Trace*   BLOOD UA  Negative   PROTEIN UA mg/dl 30 (1+)*   NITRITE UA  Negative   BILIRUBIN UA  Negative   UROBILINOGEN UA E U /dl 0 2   LEUKOCYTES UA  Trace*   WBC UA /hpf 10-20*   RBC UA /hpf None Seen   BACTERIA UA /hpf Moderate*   EPITHELIAL CELLS WET PREP /hpf Occasional   MUCUS THREADS  Occasional*         ED Treatment:   Medication Administration from 08/15/2021 1726 to 08/16/2021 0858       Date/Time Order Dose Route Action     08/15/2021 1939 sodium chloride 0 9 % bolus 1,000 mL 1,000 mL Intravenous New Bag     08/15/2021 1936 ondansetron (ZOFRAN) injection 4 mg 4 mg Intravenous Given     08/15/2021 1937 HYDROmorphone (DILAUDID) injection 1 mg 1 mg Intravenous Given     08/15/2021 1940 pantoprazole (PROTONIX) injection 40 mg 40 mg Intravenous Given     08/15/2021 2120 ceftriaxone (ROCEPHIN) 1 g/50 mL in dextrose IVPB 1,000 mg Intravenous New Bag     08/16/2021 0817 amiodarone tablet 200 mg 200 mg Oral Given     08/15/2021 2316 rivaroxaban (XARELTO) tablet 20 mg 20 mg Oral Given     08/16/2021 0541 sodium chloride 0 9 % infusion 75 mL/hr Intravenous Rate/Dose Change     08/15/2021 2331 sodium chloride 0 9 % infusion 100 mL/hr Intravenous New Bag     08/16/2021 0817 oxyCODONE (ROXICODONE) immediate release tablet 10 mg 10 mg Oral Given     08/15/2021 2317 HYDROmorphone (DILAUDID) injection 0 5 mg 0 5 mg Intravenous Given     Past Medical History:   Diagnosis Date    Arrhythmia     Arthritis     Atrial fibrillation (HCC)     Breast lump     CKD (chronic kidney disease) stage 3, GFR 30-59 ml/min (HCC)     Disease of thyroid gland     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 ([facility] ) 5/25/2020    GERD (gastroesophageal reflux disease)     H/O transfusion 1987    Hepatitis C     resolved    Hepatitis C     Hyperlipidemia     Hypertension     Irregular heart beat     Pacemaker     Sleep apnea     no cpap    Tachycardia      Present on Admission:   Nicotine dependence   Chronic combined systolic and diastolic congestive heart failure (HCC)   Paroxysmal A-fib (HCC)   Elevated troponin   Acute renal failure superimposed on stage 3 chronic kidney disease ([facility] )      Admitting Diagnosis: Gastritis [K29 70]  CAD (coronary artery disease) [I25 10]  Pyelonephritis [N12]  Nausea & vomiting [R11 2]  Elevated troponin [R77 8]  Flank pain [R10 9]  Age/Sex: 64 y o  female  Admission Orders:  Scheduled Medications:  aluminum-magnesium hydroxide-simethicone, 30 mL, Oral, Once  amiodarone, 200 mg, Oral, Daily With Breakfast  cefTRIAXone, 1,000 mg, Intravenous, Q24H  metoprolol succinate, 150 mg, Oral, BID  nicotine, 1 patch, Transdermal, Daily  nystatin, , Topical, BID  pantoprazole, 40 mg, Intravenous, Q12H [address]    Continuous IV Infusions:  sodium chloride, 75 mL/hr, Intravenous, Continuous    PRN Meds:  acetaminophen, 650 mg, Oral, Q4H PRN  albuterol, 2 puff, Inhalation, Q6H PRN  HYDROmorphone, 0 5 mg, Intravenous, Q1H PRN 8/15 x1, 8/16 x1  ondansetron, 4 mg, Intravenous, Q6H PRN  oxyCODONE, 10 mg, Oral, Q4H PRN 8/16 x1  oxyCODONE, 5 mg, Oral, Q4H PRN          Network Utilization Review Department  ATTENTION: Please call with any questions or concerns to [phone] and carefully listen to the prompts so that you are directed to the right person  All voicemails are confidential   Adi Chisholm all requests for admission clinical reviews, approved or denied determinations and any other requests to dedicated fax number below belonging to the campus where the patient is receiving treatment   List of dedicated fax numbers for the Facilities:  [address] DENIALS (Administrative/Medical Necessity) [phone]   [address] (Maternity/NICU/Pediatrics) [phone]   [address] Nelida Mcclure 7000 [id number] [place] [address] [address] [facility] [facility] [phone]
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
## 2021-08-16 NOTE — ASSESSMENT & PLAN NOTE
Follows with cardiology as above  Xarelto held 08/16, amiodarone 200 mg daily, metoprolol succinate 100 mg 1 5 tablets b i d  Currently rhythm irregular, rate controlled  Plan:  · Continue home medications  · Monitor rate  Monitor blood pressure

## 2021-08-16 NOTE — PLAN OF CARE
Problem: PAIN - ADULT  Goal: Verbalizes/displays adequate comfort level or baseline comfort level  Description: Interventions:  - Encourage patient to monitor pain and request assistance  - Assess pain using appropriate pain scale  - Administer analgesics based on type and severity of pain and evaluate response  - Implement non-pharmacological measures as appropriate and evaluate response  - Consider cultural and social influences on pain and pain management  - Notify physician/advanced practitioner if interventions unsuccessful or patient reports new pain  Outcome: Progressing     Problem: INFECTION - ADULT  Goal: Absence of fever/infection during neutropenic period  Description: INTERVENTIONS:  - Monitor WBC    Outcome: Progressing     Problem: SAFETY ADULT  Goal: Maintains/Returns to pre admission functional level  Description: INTERVENTIONS:  - Perform BMAT or MOVE assessment daily    - Set and communicate daily mobility goal to care team and patient/family/caregiver  - Collaborate with rehabilitation services on mobility goals if consulted  - Perform Range of Motion three times a day  - Reposition patient every two hours    - Dangle patient three times a day  - Stand patient three times a day  - Ambulate patient three times a day  - Out of bed to chair three times a day   - Out of bed for meals three times a day  - Out of bed for toileting  - Record patient progress and toleration of activity level   Outcome: Progressing

## 2021-08-16 NOTE — ASSESSMENT & PLAN NOTE
This is a 51-year-old female with worsening LUQ abd pain and left-sided flank pain over past 2 weeks  Urine microscopy = + leukocytes, + moderate bacteria  No RBCs  CT abdomen = left-sided perinephric stranding  Given 1 dose of Rocephin in ED    2/2 pyelonephritis vs MSK vs unlikely nephrolithiasis/urolithiasis vs PUD (LUQ abd pain) vs combination of aforementioned     Plan:  · Continue Rocephin, narrow per Urine C&S   · Monitor temperature, heart rate, blood pressure  · Tylenol p r n  The patient states this has not helped  · Pain regimen:  Oxy 5 mg Q 4 p r n  For moderate pain, Oxy 10 mg Q 4 p r n  For severe pain, Dilaudid 0 5 mg for breakthrough  · Monitor I&Os, daily weights, volume status

## 2021-08-17 PROBLEM — N18.30 ACUTE RENAL FAILURE SUPERIMPOSED ON STAGE 3 CHRONIC KIDNEY DISEASE (HCC): Status: RESOLVED | Noted: 2018-12-26 | Resolved: 2021-08-17

## 2021-08-17 PROBLEM — N17.9 ACUTE RENAL FAILURE SUPERIMPOSED ON STAGE 3 CHRONIC KIDNEY DISEASE (HCC): Status: RESOLVED | Noted: 2018-12-26 | Resolved: 2021-08-17

## 2021-08-17 LAB
ALBUMIN SERPL BCP-MCNC: 3.1 G/DL (ref 3.5–5)
ALP SERPL-CCNC: 61 U/L (ref 46–116)
ALT SERPL W P-5'-P-CCNC: 12 U/L (ref 12–78)
ANION GAP SERPL CALCULATED.3IONS-SCNC: 8 MMOL/L (ref 4–13)
AST SERPL W P-5'-P-CCNC: 15 U/L (ref 5–45)
BACTERIA UR CULT: NORMAL
BACTERIA UR QL AUTO: ABNORMAL /HPF
BASOPHILS # BLD AUTO: 0.02 THOUSANDS/ΜL (ref 0–0.1)
BASOPHILS NFR BLD AUTO: 1 % (ref 0–1)
BILIRUB SERPL-MCNC: 0.19 MG/DL (ref 0.2–1)
BILIRUB UR QL STRIP: NEGATIVE
BUN SERPL-MCNC: 26 MG/DL (ref 5–25)
CALCIUM ALBUM COR SERPL-MCNC: 9.2 MG/DL (ref 8.3–10.1)
CALCIUM SERPL-MCNC: 8.5 MG/DL (ref 8.3–10.1)
CHLORIDE SERPL-SCNC: 107 MMOL/L (ref 100–108)
CK SERPL-CCNC: 68 U/L (ref 26–192)
CLARITY UR: CLEAR
CO2 SERPL-SCNC: 26 MMOL/L (ref 21–32)
COLOR UR: YELLOW
CREAT SERPL-MCNC: 2.27 MG/DL (ref 0.6–1.3)
EOSINOPHIL # BLD AUTO: 0.06 THOUSAND/ΜL (ref 0–0.61)
EOSINOPHIL NFR BLD AUTO: 2 % (ref 0–6)
ERYTHROCYTE [DISTWIDTH] IN BLOOD BY AUTOMATED COUNT: 14 % (ref 11.6–15.1)
GFR SERPL CREATININE-BSD FRML MDRD: 23 ML/MIN/1.73SQ M
GLUCOSE SERPL-MCNC: 105 MG/DL (ref 65–140)
GLUCOSE UR STRIP-MCNC: NEGATIVE MG/DL
HCT VFR BLD AUTO: 38.8 % (ref 34.8–46.1)
HGB BLD-MCNC: 12.3 G/DL (ref 11.5–15.4)
HGB UR QL STRIP.AUTO: NEGATIVE
IMM GRANULOCYTES # BLD AUTO: 0.02 THOUSAND/UL (ref 0–0.2)
IMM GRANULOCYTES NFR BLD AUTO: 1 % (ref 0–2)
KETONES UR STRIP-MCNC: NEGATIVE MG/DL
LEUKOCYTE ESTERASE UR QL STRIP: NEGATIVE
LYMPHOCYTES # BLD AUTO: 1.64 THOUSANDS/ΜL (ref 0.6–4.47)
LYMPHOCYTES NFR BLD AUTO: 41 % (ref 14–44)
MCH RBC QN AUTO: 28.5 PG (ref 26.8–34.3)
MCHC RBC AUTO-ENTMCNC: 31.7 G/DL (ref 31.4–37.4)
MCV RBC AUTO: 90 FL (ref 82–98)
MONOCYTES # BLD AUTO: 0.34 THOUSAND/ΜL (ref 0.17–1.22)
MONOCYTES NFR BLD AUTO: 8 % (ref 4–12)
NEUTROPHILS # BLD AUTO: 1.97 THOUSANDS/ΜL (ref 1.85–7.62)
NEUTS SEG NFR BLD AUTO: 47 % (ref 43–75)
NITRITE UR QL STRIP: NEGATIVE
NON-SQ EPI CELLS URNS QL MICRO: ABNORMAL /HPF
NRBC BLD AUTO-RTO: 0 /100 WBCS
NT-PROBNP SERPL-MCNC: 3479 PG/ML
PH UR STRIP.AUTO: 6 [PH]
PLATELET # BLD AUTO: 96 THOUSANDS/UL (ref 149–390)
PMV BLD AUTO: 12 FL (ref 8.9–12.7)
POTASSIUM SERPL-SCNC: 4.1 MMOL/L (ref 3.5–5.3)
PROT SERPL-MCNC: 6 G/DL (ref 6.4–8.2)
PROT UR STRIP-MCNC: NEGATIVE MG/DL
RBC # BLD AUTO: 4.31 MILLION/UL (ref 3.81–5.12)
RBC #/AREA URNS AUTO: ABNORMAL /HPF
SODIUM SERPL-SCNC: 141 MMOL/L (ref 136–145)
SP GR UR STRIP.AUTO: 1.01 (ref 1–1.03)
UROBILINOGEN UR QL STRIP.AUTO: 0.2 E.U./DL
WBC # BLD AUTO: 4.05 THOUSAND/UL (ref 4.31–10.16)
WBC #/AREA URNS AUTO: ABNORMAL /HPF

## 2021-08-17 PROCEDURE — 83880 ASSAY OF NATRIURETIC PEPTIDE: CPT | Performed by: FAMILY MEDICINE

## 2021-08-17 PROCEDURE — 85025 COMPLETE CBC W/AUTO DIFF WBC: CPT | Performed by: CHIROPRACTOR

## 2021-08-17 PROCEDURE — 99232 SBSQ HOSP IP/OBS MODERATE 35: CPT | Performed by: FAMILY MEDICINE

## 2021-08-17 PROCEDURE — C9113 INJ PANTOPRAZOLE SODIUM, VIA: HCPCS | Performed by: FAMILY MEDICINE

## 2021-08-17 PROCEDURE — 81001 URINALYSIS AUTO W/SCOPE: CPT | Performed by: CHIROPRACTOR

## 2021-08-17 PROCEDURE — 80053 COMPREHEN METABOLIC PANEL: CPT | Performed by: CHIROPRACTOR

## 2021-08-17 PROCEDURE — 99255 IP/OBS CONSLTJ NEW/EST HI 80: CPT | Performed by: INTERNAL MEDICINE

## 2021-08-17 PROCEDURE — 82550 ASSAY OF CK (CPK): CPT | Performed by: INTERNAL MEDICINE

## 2021-08-17 RX ORDER — POLYETHYLENE GLYCOL 3350 17 G/17G
17 POWDER, FOR SOLUTION ORAL DAILY
Status: DISCONTINUED | OUTPATIENT
Start: 2021-08-18 | End: 2021-08-19 | Stop reason: HOSPADM

## 2021-08-17 RX ORDER — FUROSEMIDE 10 MG/ML
20 INJECTION INTRAMUSCULAR; INTRAVENOUS ONCE
Status: DISCONTINUED | OUTPATIENT
Start: 2021-08-17 | End: 2021-08-17

## 2021-08-17 RX ORDER — SODIUM CHLORIDE 9 MG/ML
75 INJECTION, SOLUTION INTRAVENOUS CONTINUOUS
Status: DISCONTINUED | OUTPATIENT
Start: 2021-08-17 | End: 2021-08-19

## 2021-08-17 RX ORDER — SODIUM CHLORIDE 9 MG/ML
100 INJECTION, SOLUTION INTRAVENOUS CONTINUOUS
Status: DISCONTINUED | OUTPATIENT
Start: 2021-08-17 | End: 2021-08-17

## 2021-08-17 RX ORDER — SENNOSIDES 8.6 MG
1 TABLET ORAL
Status: DISCONTINUED | OUTPATIENT
Start: 2021-08-17 | End: 2021-08-19 | Stop reason: HOSPADM

## 2021-08-17 RX ADMIN — OXYCODONE HYDROCHLORIDE 10 MG: 10 TABLET ORAL at 01:20

## 2021-08-17 RX ADMIN — METOPROLOL SUCCINATE 150 MG: 100 TABLET, EXTENDED RELEASE ORAL at 08:22

## 2021-08-17 RX ADMIN — NICOTINE 1 PATCH: 7 PATCH, EXTENDED RELEASE TRANSDERMAL at 08:22

## 2021-08-17 RX ADMIN — SODIUM CHLORIDE 75 ML/HR: 0.9 INJECTION, SOLUTION INTRAVENOUS at 18:15

## 2021-08-17 RX ADMIN — HYDROMORPHONE HYDROCHLORIDE 0.5 MG: 1 INJECTION, SOLUTION INTRAMUSCULAR; INTRAVENOUS; SUBCUTANEOUS at 22:30

## 2021-08-17 RX ADMIN — SODIUM CHLORIDE 75 ML/HR: 0.9 INJECTION, SOLUTION INTRAVENOUS at 01:25

## 2021-08-17 RX ADMIN — PANTOPRAZOLE SODIUM 40 MG: 40 INJECTION, POWDER, FOR SOLUTION INTRAVENOUS at 22:23

## 2021-08-17 RX ADMIN — CEFTRIAXONE SODIUM 1000 MG: 10 INJECTION, POWDER, FOR SOLUTION INTRAVENOUS at 22:23

## 2021-08-17 RX ADMIN — HYDROMORPHONE HYDROCHLORIDE 0.5 MG: 1 INJECTION, SOLUTION INTRAMUSCULAR; INTRAVENOUS; SUBCUTANEOUS at 10:33

## 2021-08-17 RX ADMIN — OXYCODONE HYDROCHLORIDE 10 MG: 10 TABLET ORAL at 08:21

## 2021-08-17 RX ADMIN — SODIUM CHLORIDE 500 ML: 0.9 INJECTION, SOLUTION INTRAVENOUS at 10:37

## 2021-08-17 RX ADMIN — SENNOSIDES 8.6 MG: 8.6 TABLET, FILM COATED ORAL at 22:23

## 2021-08-17 RX ADMIN — NYSTATIN: 100000 POWDER TOPICAL at 08:23

## 2021-08-17 RX ADMIN — NYSTATIN: 100000 POWDER TOPICAL at 17:12

## 2021-08-17 RX ADMIN — OXYCODONE HYDROCHLORIDE 10 MG: 10 TABLET ORAL at 17:11

## 2021-08-17 RX ADMIN — AMIODARONE HYDROCHLORIDE 200 MG: 200 TABLET ORAL at 08:22

## 2021-08-17 RX ADMIN — PANTOPRAZOLE SODIUM 40 MG: 40 INJECTION, POWDER, FOR SOLUTION INTRAVENOUS at 08:22

## 2021-08-17 NOTE — ASSESSMENT & PLAN NOTE
08/17/2021 last 2 troponins have resulted 0 07    Plan:    · Will consider consult Cardiology if further increases are noted
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
## 2021-08-17 NOTE — PLAN OF CARE
Problem: PAIN - ADULT  Goal: Verbalizes/displays adequate comfort level or baseline comfort level  Description: Interventions:  - Encourage patient to monitor pain and request assistance  - Assess pain using appropriate pain scale  - Administer analgesics based on type and severity of pain and evaluate response  - Implement non-pharmacological measures as appropriate and evaluate response  - Consider cultural and social influences on pain and pain management  - Notify physician/advanced practitioner if interventions unsuccessful or patient reports new pain  Outcome: Progressing     Problem: INFECTION - ADULT  Goal: Absence or prevention of progression during hospitalization  Description: INTERVENTIONS:  - Assess and monitor for signs and symptoms of infection  - Monitor lab/diagnostic results  - Monitor all insertion sites, i e  indwelling lines, tubes, and drains  - Monitor endotracheal if appropriate and nasal secretions for changes in amount and color  - [place] appropriate cooling/warming therapies per order  - Administer medications as ordered  - Instruct and encourage patient and family to use good hand hygiene technique  - Identify and instruct in appropriate isolation precautions for identified infection/condition  Outcome: Progressing  Goal: Absence of fever/infection during neutropenic period  Description: INTERVENTIONS:  - Monitor WBC    Outcome: Progressing     Problem: SAFETY ADULT  Goal: Patient will remain free of falls  Description: INTERVENTIONS:  - Educate patient/family on patient safety including physical limitations  - Instruct patient to call for assistance with activity   - Consult OT/PT to assist with strengthening/mobility   - Keep Call bell within reach  - Keep bed low and locked with side rails adjusted as appropriate  - Keep care items and personal belongings within reach  - Initiate and maintain comfort rounds  - Make Fall Risk Sign visible to staff  - Offer Toileting every 2 Hours, in advance of need  - Initiate/Maintain bed alarm  - Obtain necessary fall risk management equipment: alarms  - Apply yellow socks and bracelet for high fall risk patients  - Consider moving patient to room near nurses station  Outcome: Progressing  Goal: Maintain or return to baseline ADL function  Description: INTERVENTIONS:  -  Assess patient's ability to carry out ADLs; assess patient's baseline for ADL function and identify physical deficits which impact ability to perform ADLs (bathing, care of mouth/teeth, toileting, grooming, dressing, etc )  - Assess/evaluate cause of self-care deficits   - Assess range of motion  - Assess patient's mobility; develop plan if impaired  - Assess patient's need for assistive devices and provide as appropriate  - Encourage maximum independence but intervene and supervise when necessary  - Involve family in performance of ADLs  - Assess for home care needs following discharge   - Consider OT consult to assist with ADL evaluation and planning for discharge  - Provide patient education as appropriate  Outcome: Progressing  Goal: Maintains/Returns to pre admission functional level  Description: INTERVENTIONS:  - Perform BMAT or MOVE assessment daily    - Set and communicate daily mobility goal to care team and patient/family/caregiver  - Collaborate with rehabilitation services on mobility goals if consulted  - Perform Range of Motion 4 times a day  - Reposition patient every 4 hours    - Dangle patient 4 times a day  - Stand patient 4 times a day  - Ambulate patient 4 times a day  - Out of bed to chair 3 times a day   - Out of bed for meals 3 times a day  - Out of bed for toileting  - Record patient progress and toleration of activity level   Outcome: Progressing     Problem: DISCHARGE PLANNING  Goal: Discharge to home or other facility with appropriate resources  Description: INTERVENTIONS:  - Identify barriers to discharge w/patient and caregiver  - Arrange for needed discharge resources and transportation as appropriate  - Identify discharge learning needs (meds, wound care, etc )  - Arrange for interpretive services to assist at discharge as needed  - Refer to Case Management Department for coordinating discharge planning if the patient needs post-hospital services based on physician/advanced practitioner order or complex needs related to functional status, cognitive ability, or social support system  Outcome: Progressing     Problem: Knowledge Deficit  Goal: Patient/family/caregiver demonstrates understanding of disease process, treatment plan, medications, and discharge instructions  Description: Complete learning assessment and assess knowledge base    Interventions:  - Provide teaching at level of understanding  - Provide teaching via preferred learning methods  Outcome: Progressing

## 2021-08-17 NOTE — ASSESSMENT & PLAN NOTE
Lab Results   Component Value Date    EGFR 23 08/17/2021    EGFR 29 08/16/2021    EGFR 28 08/15/2021    CREATININE 2 27 (H) 08/17/2021    CREATININE 1 88 (H) 08/16/2021    CREATININE 1 98 (H) 08/15/2021     Follows with Nephrology - Dr Char Montemayor and Magy Gillis Consulted    Plan:  · Hold Lasix and Entresto  Will restart once creatinine at baseline    · Will use hydralazine 5 mg p r n  for SBP > 160   · IV fluid at maintenance  · Trend creatinine

## 2021-08-17 NOTE — ASSESSMENT & PLAN NOTE
This is a 59-year-old female presented with worsening LUQ abd pain and left-sided flank pain over past 2 weeks  Urine microscopy = + leukocytes, + moderate bacteria  No RBCs  CT abdomen = left-sided perinephric stranding  Plan:  · Continue Rocephin, narrow per Urine C&S   · Monitor temperature, heart rate, blood pressure  · Tylenol p r n  The patient states this has not helped  · Pain regimen:  Oxy 5 mg Q 4 p r n  For moderate pain, Oxy 10 mg Q 4 p r n  For severe pain, Dilaudid 0 5 mg for breakthrough  · Monitor I&Os, daily weights, volume status

## 2021-08-17 NOTE — ASSESSMENT & PLAN NOTE
Self reported "coffee ground" emesis x2 weeks - denies melena/bloody stools  H/o cholecystectomy  Plan:  · Diet advanced  · IV fluids   · Fecal occult blood still pending  · Given no coffee-ground emesis over last couple days, d/c Xarelto currently  IV Protonix 40 mg b i d  For now  If fecal occult blood positive and increasing suspicion of upper GI bleed/episode of coffee-ground emesis while in hospital will start IV Protonix infusion and consult GI   · CBC daily, monitor Hb

## 2021-08-17 NOTE — ASSESSMENT & PLAN NOTE
Wt Readings from Last 3 Encounters:   08/15/21 92 1 kg (203 lb)   08/13/21 92 2 kg (203 lb 4 oz)   07/25/21 98 7 kg (217 lb 9 5 oz)     Follows with cardiology Dr Hernandez Gracia and Dr Perez Genera [facility])  No signs of volume overload at this time  Dry weight as of currently as  203 lb  Plan:  Is getting IV fluids  Monitor volume status, daily I&O, daily weights  If evidence of volume overload give Lasix  Restart Lasix once creatinine at baseline

## 2021-08-18 LAB
ALBUMIN SERPL BCP-MCNC: 3.3 G/DL (ref 3.5–5)
ALP SERPL-CCNC: 71 U/L (ref 46–116)
ALT SERPL W P-5'-P-CCNC: 16 U/L (ref 12–78)
ANION GAP SERPL CALCULATED.3IONS-SCNC: 9 MMOL/L (ref 4–13)
AST SERPL W P-5'-P-CCNC: 21 U/L (ref 5–45)
BASOPHILS # BLD AUTO: 0.01 THOUSANDS/ΜL (ref 0–0.1)
BASOPHILS NFR BLD AUTO: 0 % (ref 0–1)
BILIRUB SERPL-MCNC: 0.19 MG/DL (ref 0.2–1)
BUN SERPL-MCNC: 22 MG/DL (ref 5–25)
CALCIUM ALBUM COR SERPL-MCNC: 8.9 MG/DL (ref 8.3–10.1)
CALCIUM SERPL-MCNC: 8.3 MG/DL (ref 8.3–10.1)
CHLORIDE SERPL-SCNC: 109 MMOL/L (ref 100–108)
CO2 SERPL-SCNC: 24 MMOL/L (ref 21–32)
CREAT SERPL-MCNC: 1.94 MG/DL (ref 0.6–1.3)
CRP SERPL QL: <3 MG/L
EOSINOPHIL # BLD AUTO: 0.06 THOUSAND/ΜL (ref 0–0.61)
EOSINOPHIL NFR BLD AUTO: 2 % (ref 0–6)
ERYTHROCYTE [DISTWIDTH] IN BLOOD BY AUTOMATED COUNT: 14.2 % (ref 11.6–15.1)
FERRITIN SERPL-MCNC: 74 NG/ML (ref 8–388)
GFR SERPL CREATININE-BSD FRML MDRD: 28 ML/MIN/1.73SQ M
GLUCOSE SERPL-MCNC: 111 MG/DL (ref 65–140)
HCT VFR BLD AUTO: 40 % (ref 34.8–46.1)
HGB BLD-MCNC: 12.4 G/DL (ref 11.5–15.4)
IMM GRANULOCYTES # BLD AUTO: 0 THOUSAND/UL (ref 0–0.2)
IMM GRANULOCYTES NFR BLD AUTO: 0 % (ref 0–2)
INR PPP: 1.04 (ref 0.84–1.19)
IRON SATN MFR SERPL: 17 %
IRON SERPL-MCNC: 50 UG/DL (ref 50–170)
LIPASE SERPL-CCNC: 378 U/L (ref 73–393)
LYMPHOCYTES # BLD AUTO: 1.29 THOUSANDS/ΜL (ref 0.6–4.47)
LYMPHOCYTES NFR BLD AUTO: 34 % (ref 14–44)
MAGNESIUM SERPL-MCNC: 1.9 MG/DL (ref 1.6–2.6)
MCH RBC QN AUTO: 28 PG (ref 26.8–34.3)
MCHC RBC AUTO-ENTMCNC: 31 G/DL (ref 31.4–37.4)
MCV RBC AUTO: 90 FL (ref 82–98)
MONOCYTES # BLD AUTO: 0.2 THOUSAND/ΜL (ref 0.17–1.22)
MONOCYTES NFR BLD AUTO: 5 % (ref 4–12)
NEUTROPHILS # BLD AUTO: 2.23 THOUSANDS/ΜL (ref 1.85–7.62)
NEUTS SEG NFR BLD AUTO: 59 % (ref 43–75)
NRBC BLD AUTO-RTO: 0 /100 WBCS
PLATELET # BLD AUTO: 94 THOUSANDS/UL (ref 149–390)
PMV BLD AUTO: 11.9 FL (ref 8.9–12.7)
POTASSIUM SERPL-SCNC: 4.2 MMOL/L (ref 3.5–5.3)
PROT SERPL-MCNC: 6.6 G/DL (ref 6.4–8.2)
PROTHROMBIN TIME: 13.7 SECONDS (ref 11.6–14.5)
RBC # BLD AUTO: 4.43 MILLION/UL (ref 3.81–5.12)
SODIUM SERPL-SCNC: 142 MMOL/L (ref 136–145)
TIBC SERPL-MCNC: 289 UG/DL (ref 250–450)
WBC # BLD AUTO: 3.79 THOUSAND/UL (ref 4.31–10.16)

## 2021-08-18 PROCEDURE — 80053 COMPREHEN METABOLIC PANEL: CPT | Performed by: CHIROPRACTOR

## 2021-08-18 PROCEDURE — 86140 C-REACTIVE PROTEIN: CPT | Performed by: FAMILY MEDICINE

## 2021-08-18 PROCEDURE — 82272 OCCULT BLD FECES 1-3 TESTS: CPT | Performed by: FAMILY MEDICINE

## 2021-08-18 PROCEDURE — 83690 ASSAY OF LIPASE: CPT | Performed by: FAMILY MEDICINE

## 2021-08-18 PROCEDURE — 99232 SBSQ HOSP IP/OBS MODERATE 35: CPT | Performed by: FAMILY MEDICINE

## 2021-08-18 PROCEDURE — 99232 SBSQ HOSP IP/OBS MODERATE 35: CPT | Performed by: INTERNAL MEDICINE

## 2021-08-18 PROCEDURE — 83550 IRON BINDING TEST: CPT | Performed by: FAMILY MEDICINE

## 2021-08-18 PROCEDURE — 83540 ASSAY OF IRON: CPT | Performed by: FAMILY MEDICINE

## 2021-08-18 PROCEDURE — 82728 ASSAY OF FERRITIN: CPT | Performed by: FAMILY MEDICINE

## 2021-08-18 PROCEDURE — C9113 INJ PANTOPRAZOLE SODIUM, VIA: HCPCS | Performed by: FAMILY MEDICINE

## 2021-08-18 PROCEDURE — 83735 ASSAY OF MAGNESIUM: CPT | Performed by: CHIROPRACTOR

## 2021-08-18 PROCEDURE — 85025 COMPLETE CBC W/AUTO DIFF WBC: CPT | Performed by: CHIROPRACTOR

## 2021-08-18 PROCEDURE — 85610 PROTHROMBIN TIME: CPT | Performed by: FAMILY MEDICINE

## 2021-08-18 RX ADMIN — OXYCODONE HYDROCHLORIDE 10 MG: 10 TABLET ORAL at 06:31

## 2021-08-18 RX ADMIN — NYSTATIN: 100000 POWDER TOPICAL at 17:32

## 2021-08-18 RX ADMIN — HYDROMORPHONE HYDROCHLORIDE 0.5 MG: 1 INJECTION, SOLUTION INTRAMUSCULAR; INTRAVENOUS; SUBCUTANEOUS at 12:58

## 2021-08-18 RX ADMIN — PANTOPRAZOLE SODIUM 40 MG: 40 INJECTION, POWDER, FOR SOLUTION INTRAVENOUS at 12:59

## 2021-08-18 RX ADMIN — POLYETHYLENE GLYCOL 3350 17 G: 17 POWDER, FOR SOLUTION ORAL at 09:03

## 2021-08-18 RX ADMIN — OXYCODONE HYDROCHLORIDE 10 MG: 10 TABLET ORAL at 17:37

## 2021-08-18 RX ADMIN — METOPROLOL SUCCINATE 150 MG: 100 TABLET, EXTENDED RELEASE ORAL at 09:02

## 2021-08-18 RX ADMIN — NYSTATIN: 100000 POWDER TOPICAL at 09:07

## 2021-08-18 RX ADMIN — PANTOPRAZOLE SODIUM 40 MG: 40 INJECTION, POWDER, FOR SOLUTION INTRAVENOUS at 21:25

## 2021-08-18 RX ADMIN — METOPROLOL SUCCINATE 150 MG: 100 TABLET, EXTENDED RELEASE ORAL at 17:30

## 2021-08-18 RX ADMIN — SENNOSIDES 8.6 MG: 8.6 TABLET, FILM COATED ORAL at 21:25

## 2021-08-18 RX ADMIN — SODIUM CHLORIDE 75 ML/HR: 0.9 INJECTION, SOLUTION INTRAVENOUS at 13:05

## 2021-08-18 RX ADMIN — HYDROMORPHONE HYDROCHLORIDE 0.5 MG: 1 INJECTION, SOLUTION INTRAMUSCULAR; INTRAVENOUS; SUBCUTANEOUS at 21:30

## 2021-08-18 RX ADMIN — OXYCODONE HYDROCHLORIDE 10 MG: 10 TABLET ORAL at 11:33

## 2021-08-18 RX ADMIN — AMIODARONE HYDROCHLORIDE 200 MG: 200 TABLET ORAL at 09:02

## 2021-08-18 RX ADMIN — NICOTINE 1 PATCH: 7 PATCH, EXTENDED RELEASE TRANSDERMAL at 09:04

## 2021-08-18 NOTE — ASSESSMENT & PLAN NOTE
Lab Results   Component Value Date    EGFR 28 08/18/2021    EGFR 23 08/17/2021    EGFR 29 08/16/2021    CREATININE 1 94 (H) 08/18/2021    CREATININE 2 27 (H) 08/17/2021    CREATININE 1 88 (H) 08/16/2021   -Outpatient Nephrologist: Dr Michale Lesches  -Baseline Creatinine: 1 5-1 7  -Etiology:  Likely due to hypertensive nephrosclerosis, age-related nephron loss and nodular glomerular sclerosis from long-term smoking  -Avoid Nephrotoxins and Dose all medications per eGFR  -Will need outpatient follow up after discharge    -Nephrology following while inpatient

## 2021-08-18 NOTE — PLAN OF CARE
Problem: PAIN - ADULT  Goal: Verbalizes/displays adequate comfort level or baseline comfort level  Description: Interventions:  - Encourage patient to monitor pain and request assistance  - Assess pain using appropriate pain scale  - Administer analgesics based on type and severity of pain and evaluate response  - Implement non-pharmacological measures as appropriate and evaluate response  - Consider cultural and social influences on pain and pain management  - Notify physician/advanced practitioner if interventions unsuccessful or patient reports new pain  Outcome: Progressing     Problem: INFECTION - ADULT  Goal: Absence or prevention of progression during hospitalization  Description: INTERVENTIONS:  - Assess and monitor for signs and symptoms of infection  - Monitor lab/diagnostic results  - Monitor all insertion sites, i e  indwelling lines, tubes, and drains  - Monitor endotracheal if appropriate and nasal secretions for changes in amount and color  - [place] appropriate cooling/warming therapies per order  - Administer medications as ordered  - Instruct and encourage patient and family to use good hand hygiene technique  - Identify and instruct in appropriate isolation precautions for identified infection/condition  Outcome: Progressing  Goal: Absence of fever/infection during neutropenic period  Description: INTERVENTIONS:  - Monitor WBC    Outcome: Progressing     Problem: SAFETY ADULT  Goal: Patient will remain free of falls  Description: INTERVENTIONS:  - Educate patient/family on patient safety including physical limitations  - Instruct patient to call for assistance with activity   - Consult OT/PT to assist with strengthening/mobility   - Keep Call bell within reach  - Keep bed low and locked with side rails adjusted as appropriate  - Keep care items and personal belongings within reach  - Initiate and maintain comfort rounds  - Make Fall Risk Sign visible to staff  - Apply yellow socks and bracelet for high fall risk patients  - Consider moving patient to room near nurses station  Outcome: Progressing  Goal: Maintain or return to baseline ADL function  Description: INTERVENTIONS:  -  Assess patient's ability to carry out ADLs; assess patient's baseline for ADL function and identify physical deficits which impact ability to perform ADLs (bathing, care of mouth/teeth, toileting, grooming, dressing, etc )  - Assess/evaluate cause of self-care deficits   - Assess range of motion  - Assess patient's mobility; develop plan if impaired  - Assess patient's need for assistive devices and provide as appropriate  - Encourage maximum independence but intervene and supervise when necessary  - Involve family in performance of ADLs  - Assess for home care needs following discharge   - Consider OT consult to assist with ADL evaluation and planning for discharge  - Provide patient education as appropriate  Outcome: Progressing  Goal: Maintains/Returns to pre admission functional level  Description: INTERVENTIONS:  - Perform BMAT or MOVE assessment daily    - Set and communicate daily mobility goal to care team and patient/family/caregiver     - Collaborate with rehabilitation services on mobility goals if consulted  - Out of bed for toileting  - Record patient progress and toleration of activity level   Outcome: Progressing     Problem: DISCHARGE PLANNING  Goal: Discharge to home or other facility with appropriate resources  Description: INTERVENTIONS:  - Identify barriers to discharge w/patient and caregiver  - Arrange for needed discharge resources and transportation as appropriate  - Identify discharge learning needs (meds, wound care, etc )  - Arrange for interpretive services to assist at discharge as needed  - Refer to Case Management Department for coordinating discharge planning if the patient needs post-hospital services based on physician/advanced practitioner order or complex needs related to functional status, cognitive ability, or social support system  Outcome: Progressing     Problem: Knowledge Deficit  Goal: Patient/family/caregiver demonstrates understanding of disease process, treatment plan, medications, and discharge instructions  Description: Complete learning assessment and assess knowledge base    Interventions:  - Provide teaching at level of understanding  - Provide teaching via preferred learning methods  Outcome: Progressing

## 2021-08-18 NOTE — PROGRESS NOTES
[facility]  Progress Note - Allyne Profit 1965, 64 y o  female MRN: [id number]  Unit/Bed#: S -01 Encounter: [id number]  Primary Care Provider: Antonio Finn MD   Date and time admitted to hospital: 8/15/2021  6:25 PM    * Flank pain  Assessment & Plan  This is a 55-year-old female presented with worsening LUQ abd pain and left-sided flank pain over past 2 weeks  Urine microscopy = + leukocytes, + moderate bacteria  No RBCs  CT abdomen = left-sided perinephric stranding  Plan:  · Continue Rocephin, narrow per Urine C&S   · Monitor temperature, heart rate, blood pressure  · Tylenol p r n  The patient states this has not helped  · Pain regimen:  Oxy 5 mg Q 4 p r n  For moderate pain, Oxy 10 mg Q 4 p r n  For severe pain, Dilaudid 0 5 mg for breakthrough  · Monitor I&Os, daily weights, volume status  Epigastric abdominal tenderness  Assessment & Plan  Self reported "coffee ground" emesis x2 weeks - denies melena/bloody stools  H/o cholecystectomy  Plan:  · Diet advanced  · IV fluids   · Fecal occult blood still pending  · Given no coffee-ground emesis over last couple days, d/c Xarelto currently  IV Protonix 40 mg b i d  For now  If fecal occult blood positive and increasing suspicion of upper GI bleed/episode of coffee-ground emesis while in hospital will start IV Protonix infusion and consult GI   · CBC daily, monitor Hb    GI has been consulted  Iron studies and CRP ordered    DANIELA (acute kidney injury) ([facility] [address] )  Assessment & Plan  Acute kidney injury     -Urology now following  -Baseline creatinine:1 5-1 7 mg/dl  -Admission creatinine:  1 98 mg/dl, renal function worsened to peak creatinine 1 94 on 08/18  - Work up:   · UA with microscopy:  UA with 1+ protein, no RBCs, 10-20 WBC per high-power field  UC greater than 100 K mixed contaminants  · Imaging:  CT abdomen pelvis without contrast nonobstructing right-sided punctated intrarenal calculi    Left-sided perinephric stranding  No hydronephrosis    -Etiology:  Most likely prerenal azotemia the setting of low blood pressure in 90s on 08/16 and from volume depletion on admission from poor oral intake and nausea vomiting in the setting of recent transition to [facility] on 08/13 from previously being on lisinopril  Less likely GN as UA without any RBCs    · -Hospital Course:  Renal function worsened to peak creatinine 2 27 08/17  Received IV normal saline 500 mL bolus, current rate of IV normal saline at 75 mL/hour  · Avoid nephrotoxins and dose all medications per EGFR  · Continue to hold diuretics and ACE inhibitor/ARB/Entresto  · Does have thrombocytopenia, if renal function does not improve with hydration, would consider workup for TMAs  · Avoid hypotension  Stage 3 chronic kidney disease [facility])  Assessment & Plan  Lab Results   Component Value Date    EGFR 28 08/18/2021    EGFR 23 08/17/2021    EGFR 29 08/16/2021    CREATININE 1 94 (H) 08/18/2021    CREATININE 2 27 (H) 08/17/2021    CREATININE 1 88 (H) 08/16/2021   -Outpatient Nephrologist: Dr Taniya Tello  -Baseline Creatinine: 1 5-1 7  -Etiology:  Likely due to hypertensive nephrosclerosis, age-related nephron loss and nodular glomerular sclerosis from long-term smoking  -Avoid Nephrotoxins and Dose all medications per eGFR  -Will need outpatient follow up after discharge  -Nephrology following while inpatient    Paroxysmal A-fib [facility])  Assessment & Plan  Follows with cardiology as above  Xarelto held 08/16, amiodarone 200 mg daily, metoprolol succinate 100 mg 1 5 tablets b i d  Currently rhythm irregular, rate controlled  Plan:  · Continue home medications  · Monitor rate  Monitor blood pressure  Nicotine dependence  Assessment & Plan  Smokes approximately 4-5 cigarettes daily  Plan:  7 mg nicotine patch  Tobacco cessation education       Chronic combined systolic and diastolic congestive heart failure (HCC)  Assessment & Plan  Wt Readings from Last 3 Encounters:   08/15/21 92 1 kg (203 lb)   21 92 2 kg (203 lb 4 oz)   21 98 7 kg (217 lb 9 5 oz)     Follows with cardiology Dr Ralf Hameed and Dr Merlin Erickson [facility])  No signs of volume overload at this time  Dry weight as of currently as  203 lb  Plan:  Is getting IV fluids  Monitor volume status, daily I&O, daily weights  If evidence of volume overload give Lasix  Restart Lasix once creatinine at baseline  Elevated troponin  Assessment & Plan  2021 last 2 troponins have resulted 0 07    Plan:    · Will consider consult Cardiology if further increases are noted        VTE Pharmacologic Prophylaxis:   VTE Score: 4 Currently withheld    Mechanical VTE Prophylaxis in Place: Yes    Patient Centered Rounds: With IM team    Discussions with Specialists or Other Care Team Provider: Dr Sterling Gan    Education and Discussions with Family / Patient: Updated  () at bedside  Current Length of Stay: 3 day(s)    Current Patient Status: Inpatient     Discharge Plan / Estimated Discharge Date: Unknown at present    Code Status: Level 1 - Full Code      Subjective: The patient was seen at the bedside this morning where she reported slight improvement but still some considerable epigastric pain and pain of the left lower quadrant and flank    Objective:     Vitals:   Temp (24hrs), Av 4 °F (36 9 °C), Min:98 1 °F (36 7 °C), Max:98 7 °F (37 1 °C)    Temp:  [98 1 °F (36 7 °C)-98 7 °F (37 1 °C)] 98 7 °F (37 1 °C)  HR:  [50-61] 50  Resp:  [17-20] 18  BP: (108-160)/(64-88) 135/79  SpO2:  [92 %-96 %] 95 %  Body mass index is 31 33 kg/m²  Input and Output Summary (last 24 hours): Intake/Output Summary (Last 24 hours) at 2021 1433  Last data filed at 2021 0908  Gross per 24 hour   Intake 1000 ml   Output 2050 ml   Net -1050 ml       Physical Exam:     Physical Exam  Constitutional:       General: She is not in acute distress  HENT:      Head: Atraumatic  Cardiovascular:      Rate and Rhythm: Normal rate and regular rhythm  Heart sounds: No murmur heard  Pulmonary:      Effort: Pulmonary effort is normal       Breath sounds: No wheezing or rales  Abdominal:      Tenderness: There is abdominal tenderness (LLQ)  There is left CVA tenderness  Musculoskeletal:      Right lower leg: No edema  Left lower leg: No edema  Skin:     General: Skin is warm and dry  Neurological:      General: No focal deficit present  Mental Status: She is alert  Psychiatric:         Mood and Affect: Mood normal          Additional Data:     Labs:  Results from last 7 days   Lab Units 08/18/21  0449   WBC Thousand/uL 3 79*   HEMOGLOBIN g/dL 12 4   HEMATOCRIT % 40 0   PLATELETS Thousands/uL 94*   NEUTROS PCT % 59   LYMPHS PCT % 34   MONOS PCT % 5   EOS PCT % 2     Results from last 7 days   Lab Units 08/18/21  0449   SODIUM mmol/L 142   POTASSIUM mmol/L 4 2   CHLORIDE mmol/L 109*   CO2 mmol/L 24   BUN mg/dL 22   CREATININE mg/dL 1 94*   ANION GAP mmol/L 9   CALCIUM mg/dL 8 3   ALBUMIN g/dL 3 3*   TOTAL BILIRUBIN mg/dL 0 19*   ALK PHOS U/L 71   ALT U/L 16   AST U/L 21   GLUCOSE RANDOM mg/dL 111     Results from last 7 days   Lab Units 08/18/21  1251   INR  1 04                     Recent Cultures (last 7 days):     Results from last 7 days   Lab Units 08/15/21  2034   URINE CULTURE  >100,000 cfu/ml        Lines/Drains:  Invasive Devices     Peripheral Intravenous Line            Peripheral IV 08/18/21 Right;Ventral (anterior); Lower Forearm <1 day                Telemetry:        Last 24 Hours Medication List:   Current Facility-Administered Medications   Medication Dose Route Frequency Provider Last Rate    acetaminophen  650 mg Oral Q4H PRN Meng Waggoner MD      albuterol  2 puff Inhalation Q6H PRN Meng Waggoner MD      aluminum-magnesium hydroxide-simethicone  30 mL Oral Once Hugh Bustos MD      amiodarone  200 mg Oral Daily With Breakfast Meng Waggoner MD      HYDROmorphone  0 5 mg Intravenous Q1H PRN Meng Waggoner MD      metoprolol succinate  150 mg Oral BID Meng Reddy MD      nicotine  1 patch Transdermal Daily Meng Waggoner MD      nystatin   Topical BID Meng Waggoner MD      ondansetron  4 mg Intravenous Q6H PRN Meng Reddy MD      oxyCODONE  10 mg Oral Q4H PRN Meng Reddy MD      oxyCODONE  5 mg Oral Q4H PRN Meng Reddy MD      pantoprazole  40 mg Intravenous Q12H [facility] Meng Reddy MD      polyethylene glycol  17 g Oral Daily Kody Gan MD      senna  1 tablet Oral HS Kody Gan MD      sodium chloride  75 mL/hr Intravenous Continuous Frankie Shell MD 75 mL/hr (08/18/21 1305)        Today, Patient Was Seen By: Iliana Ann MD    ** Please Note: This note has been constructed using a voice recognition system   **

## 2021-08-18 NOTE — PROGRESS NOTES
[address] [facility] NOTE   Milo Murrieta 64 y o  female MRN: [id number]  Unit/Bed#: S -01 Encounter: [id number]  Reason for Consult: DANIELA    ASSESSMENT and PLAN:    14-year-old female with a past medical history of CKD, smoking, drug use, AFib, ICD, CHF, hypertension, GERD who initially presents with left upper quadrant abdominal pain and left-sided flank pain with coffee-ground emesis worsening for 2 weeks  Nephrology on board for a CKD and rising creatinine initial CT scan-there is concern for perinephric stranding and was started on antibiotics for possible pyelonephritis  1) DANIELA on CKD III    - outpatient nephrologist - Dr Ronny Vyas  - baseline creatinine 1 5-1 7 mg/dL  -admission creatinine 2 mg/dL rising to 2 27 mg/dL on August 17th prompting Nephrology consultation  -urinalysis with 1+ protein, no RBC, 10-20 WBC  -repeat urinalysis 0-1 WBC  -urine culture with mixed contaminant  -CT scan with nonobstructing right-sided renal calculi, left-sided perinephric stranding, no hydronephrosis  -etiology-poor perfusion/volume depletion/ARB  - patient was initially started on intravenous fluids  -ARB was held  -note, August 13th patient was changed from lisinopril to entresto  - CPK - sixty-eight  - PVR - 178 cc  - August 18th-creatinine slightly improved 1 9 mg/dL  Electrolytes are stable  Plan:  - I/O; avoid nephrotoxic agents  -continue to hold entresto and furosemide  - if BP rises above 160 consistently can give amlodipine 5 mg as long as no contraindication  -BMP in a m    -IVF-continue  - reviewed with Primary team  - further GI w/u per Primary team    2) hypertension    - holding diuretics and entresto  -on metoprolol    3) abdominal pain    -perinephric stranding but urine culture mixed contaminant  -per primary team  -patient continues to have left-sided abdominal pain radiating to epigastric region on exam on August 18th   -primary team will be consulting GI per review with primary team this morning  4) volume-history of CHF    -patient was given intravenous fluids  -diuretics were held    5) epigastric tenderness with coffee-ground emesis    -on intravenous Protonix  -per primary team  -anticoagulation held per primary team    6) electrolytes-stable    7) acid/base-stable    8) thrombocytopenia-per primary team    SUBJECTIVE / 24H INTERVAL HISTORY:    Blood pressures  systolic  Afebrile  Urine output 1 5 L  Patient still with abdominal pain  Denies other complaints  No shortness of breath  OBJECTIVE:  Current Weight: Weight - Scale: 92 1 kg (203 lb)  Vitals:    08/17/21 0733 08/17/21 1504 08/17/21 2256 08/18/21 0700   BP: 158/81 108/64 122/78 160/88   BP Location: Right arm Right arm Right arm Right arm   Pulse: 57 59 61 60   Resp: 18 17 20 18   Temp: 98 7 °F (37 1 °C) 98 5 °F (36 9 °C) 98 1 °F (36 7 °C) 98 3 °F (36 8 °C)   TempSrc: Oral Oral Oral Oral   SpO2: 98% 95% 92% 96%   Weight:           Intake/Output Summary (Last 24 hours) at 8/18/2021 0145  Last data filed at 8/18/2021 0630  Gross per 24 hour   Intake 1000 ml   Output 1550 ml   Net -550 ml     General: NAD  Skin: no rash  Eyes: anicteric sclera  ENT: moist mucous membrane  Neck: supple  Chest: CTA b/l, no ronchii, no wheeze, no rubs, no rales  CVS: s1s2, no murmur, no gallop, no rub  Abdomen: soft, tender epigastric left abdominal quadrant    Extremities: no edema LE b/l  : no reid  Neuro: AAOX3  Psych: normal affect    Medications:    Current Facility-Administered Medications:     acetaminophen (TYLENOL) tablet 650 mg, 650 mg, Oral, Q4H PRN, Meng Waggoner MD    albuterol (PROVENTIL HFA,VENTOLIN HFA) inhaler 2 puff, 2 puff, Inhalation, Q6H PRN, Meng Waggoner MD    aluminum-magnesium hydroxide-simethicone (MYLANTA) oral suspension 30 mL, 30 mL, Oral, Once, Miko Wells MD    amiodarone tablet 200 mg, 200 mg, Oral, Daily With Breakfast, Zenovia Slight, MD, 200 mg at 08/17/21 0993   ceftriaxone (ROCEPHIN) 1 g/50 mL in dextrose IVPB, 1,000 mg, Intravenous, Q24H, Meng Waggoner MD, Last Rate: 100 mL/hr at 08/17/21 2223, 1,000 mg at 08/17/21 2223    HYDROmorphone (DILAUDID) injection 0 5 mg, 0 5 mg, Intravenous, Q1H PRN, Meng Waggoner MD, 0 5 mg at 08/17/21 2230    metoprolol succinate (TOPROL-XL) 24 hr tablet 150 mg, 150 mg, Oral, BID, Meng Waggoner MD, 150 mg at 08/17/21 7979    nicotine (NICODERM CQ) 7 mg/24hr TD 24 hr patch 1 patch, 1 patch, Transdermal, Daily, Jaimee Garnett MD, 1 patch at 08/17/21 5885    nystatin (MYCOSTATIN) powder, , Topical, BID, Jaimee Garnett MD, Given at 08/17/21 1712    ondansetron (ZOFRAN) injection 4 mg, 4 mg, Intravenous, Q6H PRN, Meng Waggoner MD    oxyCODONE (ROXICODONE) immediate release tablet 10 mg, 10 mg, Oral, Q4H PRN, Meng Waggoner MD, 10 mg at 08/18/21 0631    oxyCODONE (ROXICODONE) oral solution 5 mg, 5 mg, Oral, Q4H PRN, Meng Waggoner MD    pantoprazole (PROTONIX) injection 40 mg, 40 mg, Intravenous, Q12H [address], Meng Waggoner MD, 40 mg at 08/17/21 2223    polyethylene glycol (MIRALAX) packet 17 g, 17 g, Oral, Daily, Lesia Dhaliwal MD    [facility]) tablet 8 6 mg, 1 tablet, Oral, HS, Lesia Dhaliwal MD, 8 6 mg at 08/17/21 2223    sodium chloride 0 9 % infusion, 75 mL/hr, Intravenous, Continuous, Ameena Gar MD, Last Rate: 75 mL/hr at 08/17/21 1815, 75 mL/hr at 08/17/21 1815    Laboratory Results:  Results from last 7 days   Lab Units 08/18/21  0449 08/17/21  0445 08/16/21  0540 08/15/21  1748   WBC Thousand/uL 3 79* 4 05* 6 14 10 13   HEMOGLOBIN g/dL 12 4 12 3 14 0 15 5*   HEMATOCRIT % 40 0 38 8 44 0 47 4*   PLATELETS Thousands/uL 94* 96* 110* 199   POTASSIUM mmol/L 4 2 4 1 3 9 4 1   CHLORIDE mmol/L 109* 107 106 102   CO2 mmol/L 24 26 26 26   BUN mg/dL 22 26* 22 26*   CREATININE mg/dL 1 94* 2 27* 1 88* 1 98*   CALCIUM mg/dL 8 3 8 5 9 0 9 1   MAGNESIUM mg/dL 1 9  --   --   --

## 2021-08-18 NOTE — ASSESSMENT & PLAN NOTE
Acute kidney injury     -Urology now following  -Baseline creatinine:1 5-1 7 mg/dl  -Admission creatinine:  1 98 mg/dl, renal function worsened to peak creatinine 1 94 on 08/18  - Work up:   · UA with microscopy:  UA with 1+ protein, no RBCs, 10-20 WBC per high-power field  UC greater than 100 K mixed contaminants  · Imaging:  CT abdomen pelvis without contrast nonobstructing right-sided punctated intrarenal calculi  Left-sided perinephric stranding  No hydronephrosis    -Etiology:  Most likely prerenal azotemia the setting of low blood pressure in 90s on 08/16 and from volume depletion on admission from poor oral intake and nausea vomiting in the setting of recent transition to [facility] on 08/13 from previously being on lisinopril  Less likely GN as UA without any RBCs    · -Hospital Course:  Renal function worsened to peak creatinine 2 27 08/17  Received IV normal saline 500 mL bolus, current rate of IV normal saline at 75 mL/hour  · Avoid nephrotoxins and dose all medications per EGFR  · Continue to hold diuretics and ACE inhibitor/ARB/Entresto  · Does have thrombocytopenia, if renal function does not improve with hydration, would consider workup for TMAs  · Avoid hypotension

## 2021-08-18 NOTE — ASSESSMENT & PLAN NOTE
Wt Readings from Last 3 Encounters:   08/15/21 92 1 kg (203 lb)   08/13/21 92 2 kg (203 lb 4 oz)   07/25/21 98 7 kg (217 lb 9 5 oz)     Follows with cardiology Dr Eloy Wilson and Dr Laughlin [facility])  No signs of volume overload at this time  Dry weight as of currently as  203 lb  Plan:  Is getting IV fluids  Monitor volume status, daily I&O, daily weights  If evidence of volume overload give Lasix  Restart Lasix once creatinine at baseline

## 2021-08-18 NOTE — PLAN OF CARE
Problem: PAIN - ADULT  Goal: Verbalizes/displays adequate comfort level or baseline comfort level  Description: Interventions:  - Encourage patient to monitor pain and request assistance  - Assess pain using appropriate pain scale  - Administer analgesics based on type and severity of pain and evaluate response  - Implement non-pharmacological measures as appropriate and evaluate response  - Consider cultural and social influences on pain and pain management  - Notify physician/advanced practitioner if interventions unsuccessful or patient reports new pain  Outcome: Progressing     Problem: INFECTION - ADULT  Goal: Absence or prevention of progression during hospitalization  Description: INTERVENTIONS:  - Assess and monitor for signs and symptoms of infection  - Monitor lab/diagnostic results  - Monitor all insertion sites, i e  indwelling lines, tubes, and drains  - Monitor endotracheal if appropriate and nasal secretions for changes in amount and color  - [place] appropriate cooling/warming therapies per order  - Administer medications as ordered  - Instruct and encourage patient and family to use good hand hygiene technique  - Identify and instruct in appropriate isolation precautions for identified infection/condition  Outcome: Progressing  Goal: Absence of fever/infection during neutropenic period  Description: INTERVENTIONS:  - Monitor WBC    Outcome: Progressing     Problem: SAFETY ADULT  Goal: Patient will remain free of falls  Description: INTERVENTIONS:  - Educate patient/family on patient safety including physical limitations  - Instruct patient to call for assistance with activity   - Consult OT/PT to assist with strengthening/mobility   - Keep Call bell within reach  - Keep bed low and locked with side rails adjusted as appropriate  - Keep care items and personal belongings within reach  - Initiate and maintain comfort rounds  - Make Fall Risk Sign visible to staff  - Apply yellow socks and bracelet for high fall risk patients  - Consider moving patient to room near nurses station  Outcome: Progressing  Goal: Maintain or return to baseline ADL function  Description: INTERVENTIONS:  -  Assess patient's ability to carry out ADLs; assess patient's baseline for ADL function and identify physical deficits which impact ability to perform ADLs (bathing, care of mouth/teeth, toileting, grooming, dressing, etc )  - Assess/evaluate cause of self-care deficits   - Assess range of motion  - Assess patient's mobility; develop plan if impaired  - Assess patient's need for assistive devices and provide as appropriate  - Encourage maximum independence but intervene and supervise when necessary  - Involve family in performance of ADLs  - Assess for home care needs following discharge   - Consider OT consult to assist with ADL evaluation and planning for discharge  - Provide patient education as appropriate  Outcome: Progressing  Goal: Maintains/Returns to pre admission functional level  Description: INTERVENTIONS:  - Perform BMAT or MOVE assessment daily    - Set and communicate daily mobility goal to care team and patient/family/caregiver     - Collaborate with rehabilitation services on mobility goals if consulted  - Out of bed for toileting  - Record patient progress and toleration of activity level   Outcome: Progressing     Problem: DISCHARGE PLANNING  Goal: Discharge to home or other facility with appropriate resources  Description: INTERVENTIONS:  - Identify barriers to discharge w/patient and caregiver  - Arrange for needed discharge resources and transportation as appropriate  - Identify discharge learning needs (meds, wound care, etc )  - Arrange for interpretive services to assist at discharge as needed  - Refer to Case Management Department for coordinating discharge planning if the patient needs post-hospital services based on physician/advanced practitioner order or complex needs related to functional status, cognitive ability, or social support system  Outcome: Progressing     Problem: Knowledge Deficit  Goal: Patient/family/caregiver demonstrates understanding of disease process, treatment plan, medications, and discharge instructions  Description: Complete learning assessment and assess knowledge base    Interventions:  - Provide teaching at level of understanding  - Provide teaching via preferred learning methods  Outcome: Progressing

## 2021-08-18 NOTE — PROGRESS NOTES
[facility]  Progress Note - Kecia Salinas 1965, 64 y o  female MRN: [id number]  Unit/Bed#: S -01 Encounter: [id number]  Primary Care Provider: Pao Ibrahim MD   Date and time admitted to hospital: 8/15/2021  6:25 PM    * Flank pain  Assessment & Plan  This is a 59-year-old female presented with worsening LUQ abd pain and left-sided flank pain over past 2 weeks  Urine microscopy = + leukocytes, + moderate bacteria  No RBCs  CT abdomen = left-sided perinephric stranding  Plan:  · Continue Rocephin, narrow per Urine C&S   · Monitor temperature, heart rate, blood pressure  · Tylenol p r n  The patient states this has not helped  · Pain regimen:  Oxy 5 mg Q 4 p r n  For moderate pain, Oxy 10 mg Q 4 p r n  For severe pain, Dilaudid 0 5 mg for breakthrough  · Monitor I&Os, daily weights, volume status  Epigastric abdominal tenderness  Assessment & Plan  Self reported "coffee ground" emesis x2 weeks - denies melena/bloody stools  H/o cholecystectomy  Plan:  · Diet advanced  · IV fluids   · Fecal occult blood still pending  · Given no coffee-ground emesis over last couple days, d/c Xarelto currently  IV Protonix 40 mg b i d  For now  If fecal occult blood positive and increasing suspicion of upper GI bleed/episode of coffee-ground emesis while in hospital will start IV Protonix infusion and consult GI   · CBC daily, monitor Hb  DANIELA (acute kidney injury) [facility])  Assessment & Plan  Acute kidney injury     -Urology now following  -Baseline creatinine:1 5-1 7 mg/dl  -Admission creatinine:  1 98 mg/dl, renal function worsened to peak creatinine 2 27 on 08/17  - Work up:   · UA with microscopy:  UA with 1+ protein, no RBCs, 10-20 WBC per high-power field  · Imaging:  CT abdomen pelvis without contrast nonobstructing right-sided punctated intrarenal calculi  Left-sided perinephric stranding    No hydronephrosis    -Etiology:  Most likely prerenal azotemia the setting of low blood pressure in 90s on 08/16 and from volume depletion on admission from poor oral intake and nausea vomiting in the setting of recent transition to [facility] on 08/13 from previously being on lisinopril  Less likely GN as UA without any RBCs    · -Hospital Course:  Renal function worsened to peak creatinine 2 27 08/17  Ordered for IV normal saline 500 mL bolus, increase the rate of IV normal saline to 75 mL/hour  · Avoid nephrotoxins and dose all medications per EGFR  · Continue to hold diuretics and ACE inhibitor/ARB/Entresto  · Check CK level  · Check postvoid residual  · Does have thrombocytopenia, if renal function does not improve with hydration, would consider workup for TMAs  · Avoid hypotension  Stage 3 chronic kidney disease [facility])  Assessment & Plan  Lab Results   Component Value Date    EGFR 23 08/17/2021    EGFR 29 08/16/2021    EGFR 28 08/15/2021    CREATININE 2 27 (H) 08/17/2021    CREATININE 1 88 (H) 08/16/2021    CREATININE 1 98 (H) 08/15/2021   -Outpatient Nephrologist: Dr Isa Javed  -Baseline Creatinine: 1 5-1 7  -Etiology:  Likely due to hypertensive nephrosclerosis, age-related nephron loss and nodular glomerular sclerosis from long-term smoking  -Avoid Nephrotoxins and Dose all medications per eGFR  -Will need outpatient follow up after discharge  Paroxysmal A-fib [facility])  Assessment & Plan  Follows with cardiology as above  Xarelto held 08/16, amiodarone 200 mg daily, metoprolol succinate 100 mg 1 5 tablets b i d  Currently rhythm irregular, rate controlled  Plan:  · Continue home medications  · Monitor rate  Monitor blood pressure  Nicotine dependence  Assessment & Plan  Smokes approximately 4-5 cigarettes daily  Plan:  7 mg nicotine patch  Tobacco cessation education       Chronic combined systolic and diastolic congestive heart failure (HCC)  Assessment & Plan  Wt Readings from Last 3 Encounters:   08/15/21 92 1 kg (203 lb)   08/13/21 92 2 kg (203 lb 4 oz)   21 98 7 kg (217 lb 9 5 oz)     Follows with cardiology Dr Gilles Ryan and Dr Luis Ackerman [facility])  No signs of volume overload at this time  Dry weight as of currently as  203 lb  Plan:  Is getting IV fluids  Monitor volume status, daily I&O, daily weights  If evidence of volume overload give Lasix  Restart Lasix once creatinine at baseline  Elevated troponin  Assessment & Plan  2021 last 2 troponins have resulted 0 07    Plan:    · Will consider consult Cardiology if further increases are noted    Acute renal failure superimposed on stage 3 chronic kidney disease (HCC)-resolved as of 2021  Assessment & Plan  Lab Results   Component Value Date    EGFR 23 2021    EGFR 29 2021    EGFR 28 08/15/2021    CREATININE 2 27 (H) 2021    CREATININE 1 88 (H) 2021    CREATININE 1 98 (H) 08/15/2021     Follows with Nephrology - Dr Charlene Vaughan and Dima Renee  Nepho Consulted    Plan:  · Hold Lasix and Entresto  Will restart once creatinine at baseline  · Will use hydralazine 5 mg p r n  for SBP > 160   · IV fluid at maintenance  · Trend creatinine            VTE Pharmacologic Prophylaxis:   VTE Score: 4 Currently withheld    Mechanical VTE Prophylaxis in Place: Yes    Patient Centered Rounds: With IM team    Discussions with Specialists or Other Care Team Provider: Dr Severiano Lincoln    Education and Discussions with Family / Patient: Family at bedside    Current Length of Stay: 2 day(s)    Current Patient Status: Inpatient     Discharge Plan / Estimated Discharge Date: Unknown at present    Code Status: Level 1 - Full Code      Subjective: The patient was seen at the bedside this morning which she was alert, reported some left lower quadrant pain not as severe as yesterday      Objective:     Vitals:   Temp (24hrs), Av 4 °F (36 9 °C), Min:98 °F (36 7 °C), Max:98 7 °F (37 1 °C)    Temp:  [98 °F (36 7 °C)-98 7 °F (37 1 °C)] 98 5 °F (36 9 °C)  HR:  [57-60] 59  Resp:  [17-18] 17  BP: ()/(63-81) 108/64  SpO2:  [95 %-98 %] 95 %  Body mass index is 31 33 kg/m²  Input and Output Summary (last 24 hours): Intake/Output Summary (Last 24 hours) at 8/17/2021 2122  Last data filed at 8/17/2021 1815  Gross per 24 hour   Intake 2000 ml   Output 450 ml   Net 1550 ml       Physical Exam:     Physical Exam  Constitutional:       General: She is not in acute distress  HENT:      Head: Atraumatic  Cardiovascular:      Rate and Rhythm: Normal rate and regular rhythm  Heart sounds: No murmur heard  Pulmonary:      Effort: Pulmonary effort is normal       Breath sounds: No wheezing or rales  Abdominal:      Tenderness: There is abdominal tenderness (LLQ)  There is left CVA tenderness  Musculoskeletal:      Right lower leg: No edema  Left lower leg: No edema  Skin:     General: Skin is warm and dry  Neurological:      General: No focal deficit present  Mental Status: She is alert     Psychiatric:         Mood and Affect: Mood normal          Additional Data:     Labs:  Results from last 7 days   Lab Units 08/17/21  0445   WBC Thousand/uL 4 05*   HEMOGLOBIN g/dL 12 3   HEMATOCRIT % 38 8   PLATELETS Thousands/uL 96*   NEUTROS PCT % 47   LYMPHS PCT % 41   MONOS PCT % 8   EOS PCT % 2     Results from last 7 days   Lab Units 08/17/21  0445   SODIUM mmol/L 141   POTASSIUM mmol/L 4 1   CHLORIDE mmol/L 107   CO2 mmol/L 26   BUN mg/dL 26*   CREATININE mg/dL 2 27*   ANION GAP mmol/L 8   CALCIUM mg/dL 8 5   ALBUMIN g/dL 3 1*   TOTAL BILIRUBIN mg/dL 0 19*   ALK PHOS U/L 61   ALT U/L 12   AST U/L 15   GLUCOSE RANDOM mg/dL 105                           Recent Cultures (last 7 days):     Results from last 7 days   Lab Units 08/15/21  2034   URINE CULTURE  >100,000 cfu/ml        Lines/Drains:  Invasive Devices     Peripheral Intravenous Line            Peripheral IV 08/16/21 Distal;Right;Ventral (anterior) Forearm 1 day                Telemetry:        Last 24 Hours Medication List:   Current Facility-Administered Medications   Medication Dose Route Frequency Provider Last Rate    acetaminophen  650 mg Oral Q4H PRN Meng Waggoner MD      albuterol  2 puff Inhalation Q6H PRN Meng Waggoner MD      aluminum-magnesium hydroxide-simethicone  30 mL Oral Once Patience MD Sammi      amiodarone  200 mg Oral Daily With Breakfast Meng Waggoner MD      cefTRIAXone  1,000 mg Intravenous Q24H Angela Atwood MD 1,000 mg (08/16/21 2033)    HYDROmorphone  0 5 mg Intravenous Q1H PRN Meng Waggoner MD      metoprolol succinate  150 mg Oral BID Meng Carrillo MD      nicotine  1 patch Transdermal Daily Meng Waggoner MD      nystatin   Topical BID Meng Waggoner MD      ondansetron  4 mg Intravenous Q6H PRN Meng Waggoner MD      oxyCODONE  10 mg Oral Q4H PRN Meng Waggoner MD      oxyCODONE  5 mg Oral Q4H PRN Meng Waggoner MD      pantoprazole  40 mg Intravenous Q12H [address] Meng Carrillo MD      sodium chloride  75 mL/hr Intravenous Continuous Jose Lim MD 75 mL/hr (08/17/21 1815)        Today, Patient Was Seen By: Olegario Murrieta MD    ** Please Note: This note has been constructed using a voice recognition system   **

## 2021-08-18 NOTE — ASSESSMENT & PLAN NOTE
Self reported "coffee ground" emesis x2 weeks - denies melena/bloody stools  H/o cholecystectomy  Plan:  · Diet advanced  · IV fluids   · Fecal occult blood still pending  · Given no coffee-ground emesis over last couple days, d/c Xarelto currently  IV Protonix 40 mg b i d  For now    If fecal occult blood positive and increasing suspicion of upper GI bleed/episode of coffee-ground emesis while in hospital will start IV Protonix infusion and consult GI   · CBC daily, monitor Hb    GI has been consulted  Iron studies and CRP ordered

## 2021-08-18 NOTE — ASSESSMENT & PLAN NOTE
Acute kidney injury     -Urology now following  -Baseline creatinine:1 5-1 7 mg/dl  -Admission creatinine:  1 98 mg/dl, renal function worsened to peak creatinine 2 27 on 08/17  - Work up:   · UA with microscopy:  UA with 1+ protein, no RBCs, 10-20 WBC per high-power field  · Imaging:  CT abdomen pelvis without contrast nonobstructing right-sided punctated intrarenal calculi  Left-sided perinephric stranding  No hydronephrosis    -Etiology:  Most likely prerenal azotemia the setting of low blood pressure in 90s on 08/16 and from volume depletion on admission from poor oral intake and nausea vomiting in the setting of recent transition to [facility] on 08/13 from previously being on lisinopril  Less likely GN as UA without any RBCs    · -Hospital Course:  Renal function worsened to peak creatinine 2 27 08/17  Ordered for IV normal saline 500 mL bolus, increase the rate of IV normal saline to 75 mL/hour  · Avoid nephrotoxins and dose all medications per EGFR  · Continue to hold diuretics and ACE inhibitor/ARB/Entresto  · Check CK level  · Check postvoid residual  · Does have thrombocytopenia, if renal function does not improve with hydration, would consider workup for TMAs  · Avoid hypotension

## 2021-08-18 NOTE — ASSESSMENT & PLAN NOTE
Lab Results   Component Value Date    EGFR 23 08/17/2021    EGFR 29 08/16/2021    EGFR 28 08/15/2021    CREATININE 2 27 (H) 08/17/2021    CREATININE 1 88 (H) 08/16/2021    CREATININE 1 98 (H) 08/15/2021   -Outpatient Nephrologist: Dr Denise Rizvi  -Baseline Creatinine: 1 5-1 7  -Etiology:  Likely due to hypertensive nephrosclerosis, age-related nephron loss and nodular glomerular sclerosis from long-term smoking  -Avoid Nephrotoxins and Dose all medications per eGFR  -Will need outpatient follow up after discharge

## 2021-08-19 VITALS
WEIGHT: 215.3 LBS | TEMPERATURE: 98 F | RESPIRATION RATE: 16 BRPM | BODY MASS INDEX: 33.22 KG/M2 | DIASTOLIC BLOOD PRESSURE: 86 MMHG | SYSTOLIC BLOOD PRESSURE: 161 MMHG | OXYGEN SATURATION: 96 % | HEART RATE: 60 BPM

## 2021-08-19 LAB
ALBUMIN SERPL BCP-MCNC: 3.3 G/DL (ref 3.5–5)
ALP SERPL-CCNC: 61 U/L (ref 46–116)
ALT SERPL W P-5'-P-CCNC: 19 U/L (ref 12–78)
ANION GAP SERPL CALCULATED.3IONS-SCNC: 6 MMOL/L (ref 4–13)
AST SERPL W P-5'-P-CCNC: 20 U/L (ref 5–45)
BASOPHILS # BLD AUTO: 0.02 THOUSANDS/ΜL (ref 0–0.1)
BASOPHILS NFR BLD AUTO: 1 % (ref 0–1)
BILIRUB SERPL-MCNC: 0.23 MG/DL (ref 0.2–1)
BUN SERPL-MCNC: 19 MG/DL (ref 5–25)
CALCIUM ALBUM COR SERPL-MCNC: 9.1 MG/DL (ref 8.3–10.1)
CALCIUM SERPL-MCNC: 8.5 MG/DL (ref 8.3–10.1)
CHLORIDE SERPL-SCNC: 108 MMOL/L (ref 100–108)
CO2 SERPL-SCNC: 27 MMOL/L (ref 21–32)
CREAT SERPL-MCNC: 1.94 MG/DL (ref 0.6–1.3)
EOSINOPHIL # BLD AUTO: 0.07 THOUSAND/ΜL (ref 0–0.61)
EOSINOPHIL NFR BLD AUTO: 2 % (ref 0–6)
ERYTHROCYTE [DISTWIDTH] IN BLOOD BY AUTOMATED COUNT: 14.1 % (ref 11.6–15.1)
GFR SERPL CREATININE-BSD FRML MDRD: 28 ML/MIN/1.73SQ M
GLUCOSE SERPL-MCNC: 105 MG/DL (ref 65–140)
HCT VFR BLD AUTO: 36.5 % (ref 34.8–46.1)
HEMOCCULT STL QL: NEGATIVE
HEMOCCULT STL QL: NEGATIVE
HGB BLD-MCNC: 11.4 G/DL (ref 11.5–15.4)
IMM GRANULOCYTES # BLD AUTO: 0 THOUSAND/UL (ref 0–0.2)
IMM GRANULOCYTES NFR BLD AUTO: 0 % (ref 0–2)
LYMPHOCYTES # BLD AUTO: 1.22 THOUSANDS/ΜL (ref 0.6–4.47)
LYMPHOCYTES NFR BLD AUTO: 37 % (ref 14–44)
MCH RBC QN AUTO: 28 PG (ref 26.8–34.3)
MCHC RBC AUTO-ENTMCNC: 31.2 G/DL (ref 31.4–37.4)
MCV RBC AUTO: 90 FL (ref 82–98)
MONOCYTES # BLD AUTO: 0.31 THOUSAND/ΜL (ref 0.17–1.22)
MONOCYTES NFR BLD AUTO: 10 % (ref 4–12)
NEUTROPHILS # BLD AUTO: 1.64 THOUSANDS/ΜL (ref 1.85–7.62)
NEUTS SEG NFR BLD AUTO: 50 % (ref 43–75)
NRBC BLD AUTO-RTO: 0 /100 WBCS
PLATELET # BLD AUTO: 97 THOUSANDS/UL (ref 149–390)
PMV BLD AUTO: 12 FL (ref 8.9–12.7)
POTASSIUM SERPL-SCNC: 4.3 MMOL/L (ref 3.5–5.3)
PROT SERPL-MCNC: 6.4 G/DL (ref 6.4–8.2)
RBC # BLD AUTO: 4.07 MILLION/UL (ref 3.81–5.12)
SODIUM SERPL-SCNC: 141 MMOL/L (ref 136–145)
WBC # BLD AUTO: 3.26 THOUSAND/UL (ref 4.31–10.16)

## 2021-08-19 PROCEDURE — C9113 INJ PANTOPRAZOLE SODIUM, VIA: HCPCS | Performed by: FAMILY MEDICINE

## 2021-08-19 PROCEDURE — 80053 COMPREHEN METABOLIC PANEL: CPT | Performed by: CHIROPRACTOR

## 2021-08-19 PROCEDURE — 99238 HOSP IP/OBS DSCHRG MGMT 30/<: CPT | Performed by: FAMILY MEDICINE

## 2021-08-19 PROCEDURE — 85025 COMPLETE CBC W/AUTO DIFF WBC: CPT | Performed by: CHIROPRACTOR

## 2021-08-19 PROCEDURE — 99233 SBSQ HOSP IP/OBS HIGH 50: CPT | Performed by: INTERNAL MEDICINE

## 2021-08-19 PROCEDURE — 99254 IP/OBS CNSLTJ NEW/EST MOD 60: CPT | Performed by: INTERNAL MEDICINE

## 2021-08-19 RX ORDER — FUROSEMIDE 20 MG/1
20 TABLET ORAL DAILY
Qty: 30 TABLET | Refills: 0 | Status: SHIPPED | OUTPATIENT
Start: 2021-08-20 | End: 2021-08-20 | Stop reason: SDUPTHER

## 2021-08-19 RX ORDER — SUCRALFATE 1 G/1
1 TABLET ORAL
Qty: 120 TABLET | Refills: 0 | Status: SHIPPED | OUTPATIENT
Start: 2021-08-19 | End: 2022-07-21 | Stop reason: ALTCHOICE

## 2021-08-19 RX ORDER — METHOCARBAMOL 750 MG/1
750 TABLET, FILM COATED ORAL EVERY 8 HOURS PRN
Status: DISCONTINUED | OUTPATIENT
Start: 2021-08-19 | End: 2021-08-19 | Stop reason: HOSPADM

## 2021-08-19 RX ORDER — SUCRALFATE 1 G/1
1 TABLET ORAL
Status: DISCONTINUED | OUTPATIENT
Start: 2021-08-19 | End: 2021-08-19 | Stop reason: HOSPADM

## 2021-08-19 RX ORDER — SACUBITRIL AND VALSARTAN 49; 51 MG/1; MG/1
1 TABLET, FILM COATED ORAL 2 TIMES DAILY
Qty: 60 TABLET | Refills: 0 | Status: SHIPPED | OUTPATIENT
Start: 2021-08-20 | End: 2022-06-23 | Stop reason: SDUPTHER

## 2021-08-19 RX ORDER — PANTOPRAZOLE SODIUM 40 MG/1
40 TABLET, DELAYED RELEASE ORAL DAILY
Qty: 30 TABLET | Refills: 0 | Status: SHIPPED | OUTPATIENT
Start: 2021-08-19 | End: 2022-06-17

## 2021-08-19 RX ADMIN — PANTOPRAZOLE SODIUM 40 MG: 40 INJECTION, POWDER, FOR SOLUTION INTRAVENOUS at 09:17

## 2021-08-19 RX ADMIN — POLYETHYLENE GLYCOL 3350 17 G: 17 POWDER, FOR SOLUTION ORAL at 09:15

## 2021-08-19 RX ADMIN — NICOTINE 1 PATCH: 7 PATCH, EXTENDED RELEASE TRANSDERMAL at 09:17

## 2021-08-19 RX ADMIN — AMIODARONE HYDROCHLORIDE 200 MG: 200 TABLET ORAL at 09:16

## 2021-08-19 RX ADMIN — OXYCODONE HYDROCHLORIDE 10 MG: 10 TABLET ORAL at 09:15

## 2021-08-19 RX ADMIN — HYDROMORPHONE HYDROCHLORIDE 0.5 MG: 1 INJECTION, SOLUTION INTRAMUSCULAR; INTRAVENOUS; SUBCUTANEOUS at 04:03

## 2021-08-19 RX ADMIN — METOPROLOL SUCCINATE 150 MG: 100 TABLET, EXTENDED RELEASE ORAL at 09:16

## 2021-08-19 RX ADMIN — NICOTINE POLACRILEX 4 MG: 2 GUM, CHEWING ORAL at 11:09

## 2021-08-19 RX ADMIN — NYSTATIN: 100000 POWDER TOPICAL at 09:17

## 2021-08-19 RX ADMIN — OXYCODONE HYDROCHLORIDE 10 MG: 10 TABLET ORAL at 00:57

## 2021-08-19 RX ADMIN — SODIUM CHLORIDE 75 ML/HR: 0.9 INJECTION, SOLUTION INTRAVENOUS at 04:06

## 2021-08-19 RX ADMIN — SUCRALFATE 1 G: 1 TABLET ORAL at 11:12

## 2021-08-19 RX ADMIN — METHOCARBAMOL TABLETS 750 MG: 750 TABLET, COATED ORAL at 11:12

## 2021-08-19 NOTE — ASSESSMENT & PLAN NOTE
This is a 51-year-old female presented with worsening LUQ abd pain and left-sided flank pain over past 2 weeks  Urine microscopy = + leukocytes, + moderate bacteria  No RBCs  CT abdomen = left-sided perinephric stranding        Plan:  Patient discharged in good condition will follow up with PCP within the next few days

## 2021-08-19 NOTE — DISCHARGE SUMMARY
[facility]  Discharge- Adalberto Mccormick 1965, 64 y o  female MRN: [id number]  Unit/Bed#: S -01 Encounter: [id number]  Primary Care Provider: Leila Cardona MD   Date and time admitted to hospital: 8/15/2021  6:25 PM    * Flank pain  Assessment & Plan  This is a 70-year-old female presented with worsening LUQ abd pain and left-sided flank pain over past 2 weeks  Urine microscopy = + leukocytes, + moderate bacteria  No RBCs  CT abdomen = left-sided perinephric stranding  Plan:  Patient discharged in good condition will follow up with PCP within the next few days    Epigastric abdominal tenderness  Assessment & Plan  Self reported "coffee ground" emesis x2 weeks - denies melena/bloody stools  H/o cholecystectomy  States epigastric pain worse with food intake, has 7/10 baseline epigastric pain occasionally 10/10 with radiation to the back  Plan:  · Diet advanced, healthy choices encouraged denies alcohol consumption  · Fecal occult blood negative  Protonix 40 daily  · GI scheduling EGD colonoscopy outpatient  Iron studies WNL CRP<3 0  · FOB negative    DANIELA (acute kidney injury) ([facility] [address] )  Assessment & Plan  Acute kidney injury     -Urology now following  -Baseline creatinine:1 5-1 7 mg/dl  -Admission creatinine:  1 98 mg/dl, renal function worsened to peak creatinine 1 94    ·  08/19 urology feels this may be new baseline  IV fluids discontinued  Advise IV fluids at 50 mL/hour if made NPO for GI or other procedures  · Imaging:  CT abdomen pelvis without contrast nonobstructing right-sided punctated intrarenal calculi  Left-sided perinephric stranding  No hydronephrosis    -Etiology:  Most likely prerenal azotemia the setting of low blood pressure in 90s on 08/16 and from volume depletion on admission from poor oral intake and nausea vomiting in the setting of recent transition to [facility] on 08/13 from previously being on lisinopril    Less likely GN as UA without any RBCs      · Avoid nephrotoxins and dose all medications per EGFR  · Continue to hold diuretics and ACE inhibitor/ARB/Entresto until 08/20/2021  · Avoid hypotension    Stage 3 chronic kidney disease [facility])  Assessment & Plan  Lab Results   Component Value Date    EGFR 28 08/19/2021    EGFR 28 08/18/2021    EGFR 23 08/17/2021    CREATININE 1 94 (H) 08/19/2021    CREATININE 1 94 (H) 08/18/2021    CREATININE 2 27 (H) 08/17/2021   -Outpatient Nephrologist: Dr Hernandez Dhaliwal  -Baseline Creatinine: 1 5-1 7  -Etiology:  Likely due to hypertensive nephrosclerosis, age-related nephron loss and nodular glomerular sclerosis from long-term smoking  -Avoid Nephrotoxins and Dose all medications per eGFR  -Will need outpatient follow up after discharge  Paroxysmal A-fib [facility])  Assessment & Plan  Follows with cardiology as above  Currently rhythm irregular, rate controlled  Follow-up with PCP asap upon discharge     Plan:  Continue home medications 08/20/2021     Nicotine dependence  Assessment & Plan  Smokes approximately 4-5 cigarettes daily  Not ready to quit  Plan:  Patient states patch does not work for her  Tobacco cessation education  Chronic combined systolic and diastolic congestive heart failure (HCC)  Assessment & Plan  Wt Readings from Last 3 Encounters:   08/15/21 92 1 kg (203 lb)   08/13/21 92 2 kg (203 lb 4 oz)   07/25/21 98 7 kg (217 lb 9 5 oz)     Follows with cardiology Dr Rina Snider and Dr Zambrano [facility])  No signs of volume overload at this time  Dry weight as of currently as  203 lb  Plan:     Restart Lasix and Entresto tomorrow repeat BMP in 1 week patient advised to see PCP asap upon discharge        Elevated troponin  Assessment & Plan  Last 2 troponins resulted 0 07    Patient will follow-up PCP outpatient            Discharging Resident Physician: Olegario Murrieta MD  Attending: Margi Nolan MD  PCP: Kendy Lao MD  Admission Date: 8/15/2021  Discharge Date: 08/19/21    Disposition: Home    Reason for Admission:  The patient is a 80-year-old female who presented with left flank pain on 08/15/2021  She also reported having abdominal pain with coffee-ground emesis  Her primary complaint upon admission was left-sided flank and lower abdominal pain  In ED she received IV normal saline bolus, 1 dose of Rocephin, Zofran and 1 dose of IV Protonix  Urine micro was positive for leukocytes and bacteria  Urine culture were sent and resulted mixed contaminants   CT scan revealed left-sided perinephric stranding without any other obvious abnormalities noted  The patient was also continued on IV fluids, and followed by Nephrology  During the course of her stay, the patient improved substantially with daily progress made  There was concern initially for GI bleed but FOB was negative  Iron studies also resulted negative  Hemoglobin was slightly diminished this morning however the patient is medically stable  She still has epigastric pain and some left flank pain but at this stage for she prefers to be discharged and follow-up as an outpatient  GI saw her this morning and agreed that she could be discharged as long as she takes medications as prescribed and follows up as an outpatient  GI will schedule her for EGD and colonoscopy  Her Lasix and Entresto were held during the course of her admission  She will resume both of those tomorrow  She also may resume taking Xarelto  At the bedside, I explained to both patient and her spouse how important is that she follow recommendations, and contact her doctors immediately should she experience any worsening of her symptoms at all  Medication changes were explained  Patient was advised to be very cautious with her diet, avoiding irritants  She was very pleasant, and happy to be going home today        Consultations During Hospital Stay:  · Nephrology, Gastroenterology    Procedures Performed:     · CT abdomen pelvis    Significant Findings / Test Results:     · Left perinephric stranding on CT    Incidental Findings:   · None    Test Results Pending at Discharge (will require follow up): · Follow with PCP, Cardiology, Nephrology     Outpatient Tests Requested:  · [facility] [id number]    Complications:  None    Hospital Course:     Lianne Choudhury is a 64 y o  female patient who originally presented to the hospital on 8/15/2021 due to abdominal pain and left flank pain, apparent pyelonephritis  Condition at Discharge: good     Discharge Day Visit / Exam:     Subjective: At the bedside this morning, the patient states she feels her best so far  The patient was seen by Nephrology and Gastroenterology this morning is being discharged home with very explicit instructions in good condition  Vitals: Blood Pressure: 161/86 (08/19/21 0700)  Pulse: 60 (08/19/21 0700)  Temperature: 98 °F (36 7 °C) (08/19/21 0700)  Temp Source: Oral (08/19/21 0700)  Respirations: 16 (08/19/21 0700)  Weight - Scale: 97 7 kg (215 lb 4 8 oz) (08/19/21 0600)  SpO2: 96 % (08/19/21 0700)  Exam:   Physical Exam  Constitutional:       General: She is not in acute distress  HENT:      Head: Normocephalic  Eyes:      Extraocular Movements: Extraocular movements intact  Cardiovascular:      Rate and Rhythm: Normal rate and regular rhythm  Heart sounds: Normal heart sounds  No murmur heard  Pulmonary:      Effort: No respiratory distress  Breath sounds: No wheezing or rales  Abdominal:      Palpations: Abdomen is soft  Tenderness: There is abdominal tenderness (Epigastric, left lower quadrant)  There is left CVA tenderness  Musculoskeletal:      Right lower leg: No edema  Left lower leg: No edema  Skin:     General: Skin is warm and dry  Neurological:      Mental Status: She is alert  Mental status is at baseline           Discussion with Family:  Spouse at bedside    Discharge instructions/Information to patient and family:   See after visit summary for information provided to patient and family  Provisions for Follow-Up Care:  See after visit summary for information related to follow-up care and any pertinent home health orders  Planned Readmission:  None     Discharge Medications:  See after visit summary for reconciled discharge medications provided to patient and family        ** Please Note: This note has been constructed using a voice recognition system **

## 2021-08-19 NOTE — DISCHARGE INSTRUCTIONS
Abdominal Pain   WHAT YOU NEED TO KNOW:   Abdominal pain can be dull, achy, or sharp  You may have pain in one area of your abdomen, or in your entire abdomen  Your pain may be caused by a condition such as constipation, food sensitivity or poisoning, infection, or a blockage  Abdominal pain can also be from a hernia, appendicitis, or an ulcer  Liver, gallbladder, or kidney conditions can also cause abdominal pain  The cause of your abdominal pain may be unknown  DISCHARGE INSTRUCTIONS:   Return to the emergency department if:   · You have new chest pain or shortness of breath  · You have pulsing pain in your upper abdomen or lower back that suddenly becomes constant  · Your pain is in the right lower abdominal area and worsens with movement  · You have a fever over 100 4°F (38°C) or shaking chills  · You are vomiting and cannot keep food or liquids down  · Your pain does not improve or gets worse over the next 8 to 12 hours  · You see blood in your vomit or bowel movements, or they look black and tarry  · Your skin or the whites of your eyes turn yellow  · You are a woman and have a large amount of vaginal bleeding that is not your monthly period  Contact your healthcare provider if:   · You have pain in your lower back  · You are a man and have pain in your testicles  · You have pain when you urinate  · You have questions or concerns about your condition or care  Follow up with your healthcare provider within 24 hours or as directed:  Write down your questions so you remember to ask them during your visits  Medicines:   · Medicines  may be given to calm your stomach and prevent vomiting or to decrease pain  Ask how to take pain medicine safely  · Take your medicine as directed  Contact your healthcare provider if you think your medicine is not helping or if you have side effects  Tell him of her if you are allergic to any medicine   Keep a list of the medicines, vitamins, and herbs you take  Include the amounts, and when and why you take them  Bring the list or the pill bottles to follow-up visits  Carry your medicine list with you in case of an emergency  © Copyright [employer] [facility] 2021 Information is for End User's use only and may not be sold, redistributed or otherwise used for commercial purposes  All illustrations and images included in CareNotes® are the copyrighted property of [employer]  or [employer] Holder   The above information is an  only  It is not intended as medical advice for individual conditions or treatments  Talk to your doctor, nurse or pharmacist before following any medical regimen to see if it is safe and effective for you

## 2021-08-19 NOTE — PROGRESS NOTES
NEPHROLOGY PROGRESS NOTE   Skyler Cassidy 64 y o  female MRN: [id number]  Unit/Bed#: S -01 Encounter: [id number]    ASSESSMENT & PLAN:  64 y o  female chronic kidney disease stage 3 with baseline creatinine 1 5-1 7 follows with Dr Johnson Darnell, history of smoking , marijuana and cocaine abuse, history of AFib on Xarelto, status post pacemaker and ICD in place, CHF, hypertension, GERD who was admitted to [facility] after presenting with complain of left upper quadrant pain and left-sided flank pain associated with intermittent coffee-ground emesis and nausea vomiting worse for 2 weeks  Acute kidney injury   -Baseline creatinine:1 5-1 7 mg/dl  -Admission creatinine:  1 98 mg/dl, renal function worsened to peak creatinine 2 27 on 08/17  - Work up:   · UA with microscopy:  UA with 1+ protein, no RBCs, 10-20 WBC per high-power field  · Imaging:  CT abdomen pelvis without contrast nonobstructing right-sided punctated intrarenal calculi  Left-sided perinephric stranding  No hydronephrosis  -Etiology:  Most likely prerenal azotemia the setting of low blood pressure in 90s on 08/16 and from volume depletion on admission from poor oral intake and nausea vomiting in the setting of recent transition to [place] [facility] on 08/13 from previously being on lisinopril  Less likely GN as UA without any RBCs  -CK level was 68  -bladder scan only 178 mL  [facility] Course:  Renal function worsened to peak creatinine 2 27  on 08/17, was started on IV fluids and renal function improved and now stable at 1 9 for last 2 days  -Plan:   ·  As renal function stable for last 2 days, would stop further IV fluids but may consider restarting IV fluids if plan for NPO for any procedures  Continue to monitor renal function  · Avoid nephrotoxins and dose all medications per EGFR      · Continue to hold diuretics and ACE inhibitor/ARB/Entresto  ·  Renal function already improving and platelet improving, less likely TMA  ·  Still complaining of left upper quadrant abdominal pain, follow-up GI consult  · Avoid hypotension                                              Chronic Kidney Disease Stage 3b  -Outpatient Nephrologist: Dr Cleotilde Osler  -Baseline Creatinine: 1 5-1 7  -Etiology:  Likely due to hypertensive nephrosclerosis, age-related nephron loss and nodular glomerular sclerosis from long-term smoking  -Avoid Nephrotoxins and Dose all medications per eGFR  -Will need outpatient follow up after discharge      BP/Primary hypertension  -blood pressure currently elevated  -continue to hold Entresto and diuretics  -on metoprolol for AFib with hold parameters  -if blood pressure persistently elevated may consider addition of amlodipine 5 mg with hold parameters   -avoid hypotension     Possible left-sided pyelonephritis  -previously had flank pain and tenderness which is now improved, CT suggestive of left-sided perinephric stranding  -urine culture is positive for mixed contaminant  -was treated with antibiotics initially     Volume status/chronic combined systolic and diastolic CHF  -clinically appears euvolemic, was treated with IV fluids after admission  -continue to hold Lasix   -continue to monitor volume status  Weight has trended up since admission and overall in 2 L positive balance, continue to monitor     Epigastric abdominal tenderness with complain of coffee-ground emesis:  Suspected to have peptic ulcer disease versus gastritis per primary team note  -on IV Protonix  -continue management per primary team  -patient mentions she had EGD many years ago  -recommend GI consult for further input     Paroxysmal AFib, status post ablation in May 2020  On metoprolol and amiodarone per Cardiology    Thrombocytopenia:  Continue to monitor per primary team        Discussed with primary team resident  Plan for stopping IV fluids         SUBJECTIVE:  Still with left upper quadrant abdominal pain    No chest pain or shortness of breath    OBJECTIVE:  Current Weight: Weight - Scale: 97 7 kg (215 lb 4 8 oz)  Vitals:    08/19/21 0700   BP: 161/86   Pulse: 60   Resp: 16   Temp: 98 °F (36 7 °C)   SpO2: 96%       Intake/Output Summary (Last 24 hours) at 8/19/2021 0905  Last data filed at 8/19/2021 0406  Gross per 24 hour   Intake 2651 25 ml   Output 500 ml   Net 2151 25 ml       Physical Exam  General:  Ill looking, awake  Eyes: Conjunctivae pink,  Sclera anicteric  ENT: lips and mucous membranes moist  Neck: supple   Chest: Clear to Auscultation both lungs,  no crackles, ronchus or wheezing  CVS: S1 & S2 present, normal rate, regular rhythm, no murmur    Abdomen: soft, Tender left upper abdominal quadrant, no CVA tenderness,  non-distended, Bowel sounds normoactive  Extremities: no edema of  legs  Skin: no rash  Neuro: awake, alert, oriented  Psych: Mood and affect appropriate     Medications:    Current Facility-Administered Medications:     acetaminophen (TYLENOL) tablet 650 mg, 650 mg, Oral, Q4H PRN, Meng Waggoner MD    albuterol (PROVENTIL HFA,VENTOLIN HFA) inhaler 2 puff, 2 puff, Inhalation, Q6H PRN, Meng Waggoner MD    aluminum-magnesium hydroxide-simethicone (MYLANTA) oral suspension 30 mL, 30 mL, Oral, Once, Nate Pulliam MD    amiodarone tablet 200 mg, 200 mg, Oral, Daily With Breakfast, Meng Waggoner MD, 200 mg at 08/18/21 0902    HYDROmorphone (DILAUDID) injection 0 5 mg, 0 5 mg, Intravenous, Q1H PRN, Meng Waggoner MD, 0 5 mg at 08/19/21 0403    metoprolol succinate (TOPROL-XL) 24 hr tablet 150 mg, 150 mg, Oral, BID, Meng Waggoner MD, 150 mg at 08/18/21 1730    nicotine (NICODERM CQ) 7 mg/24hr TD 24 hr patch 1 patch, 1 patch, Transdermal, Daily, Meng Waggoner MD, 1 patch at 08/18/21 0904    nystatin (MYCOSTATIN) powder, , Topical, BID, Meng Waggoner MD, Given at 08/18/21 1732    ondansetron (ZOFRAN) injection 4 mg, 4 mg, Intravenous, Q6H PRN, Jewel Kaur MD    oxyCODONE (ROXICODONE) immediate release tablet 10 mg, 10 mg, Oral, Q4H PRN, Meng Waggoner MD, 10 mg at 08/19/21 0057    oxyCODONE (ROXICODONE) oral solution 5 mg, 5 mg, Oral, Q4H PRN, Meng Waggoner MD    pantoprazole (PROTONIX) injection 40 mg, 40 mg, Intravenous, Q12H [facility] HOME, Meng Waggoner MD, 40 mg at 08/18/21 2125    polyethylene glycol (MIRALAX) packet 17 g, 17 g, Oral, Daily, Brenda Stafford MD, 17 g at 08/18/21 8764    senna (SENOKOT) tablet 8 6 mg, 1 tablet, Oral, HS, Brenda Stafford MD, 8 6 mg at 08/18/21 2125    Invasive Devices:        Lab Results:   Results from last 7 days   Lab Units 08/19/21  0506 08/18/21  0449 08/17/21  0445   WBC Thousand/uL 3 26* 3 79* 4 05*   HEMOGLOBIN g/dL 11 4* 12 4 12 3   HEMATOCRIT % 36 5 40 0 38 8   PLATELETS Thousands/uL 97* 94* 96*   POTASSIUM mmol/L 4 3 4 2 4 1   CHLORIDE mmol/L 108 109* 107   CO2 mmol/L 27 24 26   BUN mg/dL 19 22 26*   CREATININE mg/dL 1 94* 1 94* 2 27*   CALCIUM mg/dL 8 5 8 3 8 5   MAGNESIUM mg/dL  --  1 9  --    ALK PHOS U/L 61 71 61   ALT U/L 19 16 12   AST U/L 20 21 15       Previous work up:         Portions of the record may have been created with voice recognition software  Occasional wrong word or "sound a like" substitutions may have occurred due to the inherent limitations of voice recognition software  Read the chart carefully and recognize, using context, where substitutions have occurred  If you have any questions, please contact the dictating provider

## 2021-08-19 NOTE — DISCHARGE INSTR - AVS FIRST PAGE
Dear Shani Lacy,     It was our pleasure to care for you here at [facility], [employer]  It is our hope that we were always able to exceed the expected standards for your care during your stay  You were hospitalized due to ***  You were cared for on the *** floor by Parish Avalos MD under the service of Shannan Johns MD with the [facility] who covers for your primary care physician (PCP), Lucius Goddard MD, while you were hospitalized  If you have any questions or concerns related to this hospitalization, you may contact us at [phone]  For follow up as well as any medication refills, we recommend that you follow up with your primary care physician  A registered nurse will reach out to you by phone within a few days after your discharge to answer any additional questions that you may have after going home  However, at this time we provide for you here, the most important instructions / recommendations at discharge:     · Notable Medication Adjustments -   · Resume Lasix and Entresto tomorrow  · Testing Required after Discharge -   · Blood test 1 week from today BMP  · Important follow up information -   · Be sure to see your PCP asap  · Other Instructions -   · GI will contact you scheduling your scopes be sure to schedule with Nephrology as well as appointment by them if not already made    · Please review this entire after visit summary as additional general instructions including medication list, appointments, activity, diet, any pertinent wound care, and other additional recommendations from your care team that may be provided for you        Sincerely,     Parish Avalos MD

## 2021-08-19 NOTE — ASSESSMENT & PLAN NOTE
Acute kidney injury     -Urology now following  -Baseline creatinine:1 5-1 7 mg/dl  -Admission creatinine:  1 98 mg/dl, renal function worsened to peak creatinine 1 94    ·  08/19 urology feels this may be new baseline  IV fluids discontinued  Advise IV fluids at 50 mL/hour if made NPO for GI or other procedures  · Imaging:  CT abdomen pelvis without contrast nonobstructing right-sided punctated intrarenal calculi  Left-sided perinephric stranding  No hydronephrosis    -Etiology:  Most likely prerenal azotemia the setting of low blood pressure in 90s on 08/16 and from volume depletion on admission from poor oral intake and nausea vomiting in the setting of recent transition to [facility] on 08/13 from previously being on lisinopril  Less likely GN as UA without any RBCs      · Avoid nephrotoxins and dose all medications per EGFR      · Continue to hold diuretics and ACE inhibitor/ARB/Entresto until 08/20/2021  · Avoid hypotension
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
## 2021-08-19 NOTE — ASSESSMENT & PLAN NOTE
Lab Results   Component Value Date    EGFR 28 08/19/2021    EGFR 28 08/18/2021    EGFR 23 08/17/2021    CREATININE 1 94 (H) 08/19/2021    CREATININE 1 94 (H) 08/18/2021    CREATININE 2 27 (H) 08/17/2021   -Outpatient Nephrologist: Dr Gloria Beckwith  -Baseline Creatinine: 1 5-1 7  -Etiology:  Likely due to hypertensive nephrosclerosis, age-related nephron loss and nodular glomerular sclerosis from long-term smoking  -Avoid Nephrotoxins and Dose all medications per eGFR  -Will need outpatient follow up after discharge

## 2021-08-19 NOTE — ASSESSMENT & PLAN NOTE
Smokes approximately 4-5 cigarettes daily  Not ready to quit  Plan:  Patient states patch does not work for her  Tobacco cessation education

## 2021-08-19 NOTE — PLAN OF CARE
Problem: PAIN - ADULT  Goal: Verbalizes/displays adequate comfort level or baseline comfort level  Description: Interventions:  - Encourage patient to monitor pain and request assistance  - Assess pain using appropriate pain scale  - Administer analgesics based on type and severity of pain and evaluate response  - Implement non-pharmacological measures as appropriate and evaluate response  - Consider cultural and social influences on pain and pain management  - Notify physician/advanced practitioner if interventions unsuccessful or patient reports new pain  Outcome: Progressing     Problem: INFECTION - ADULT  Goal: Absence or prevention of progression during hospitalization  Description: INTERVENTIONS:  - Assess and monitor for signs and symptoms of infection  - Monitor lab/diagnostic results  - Monitor all insertion sites, i e  indwelling lines, tubes, and drains  - Monitor endotracheal if appropriate and nasal secretions for changes in amount and color  - Alva appropriate cooling/warming therapies per order  - Administer medications as ordered  - Instruct and encourage patient and family to use good hand hygiene technique  - Identify and instruct in appropriate isolation precautions for identified infection/condition  Outcome: Progressing  Goal: Absence of fever/infection during neutropenic period  Description: INTERVENTIONS:  - Monitor WBC    Outcome: Progressing     Problem: SAFETY ADULT  Goal: Patient will remain free of falls  Description: INTERVENTIONS:  - Educate patient/family on patient safety including physical limitations  - Instruct patient to call for assistance with activity   - Consult OT/PT to assist with strengthening/mobility   - Keep Call bell within reach  - Keep bed low and locked with side rails adjusted as appropriate  - Keep care items and personal belongings within reach  - Initiate and maintain comfort rounds  - Make Fall Risk Sign visible to staff  - Apply yellow socks and bracelet for high fall risk patients  - Consider moving patient to room near nurses station  Outcome: Progressing  Goal: Maintain or return to baseline ADL function  Description: INTERVENTIONS:  -  Assess patient's ability to carry out ADLs; assess patient's baseline for ADL function and identify physical deficits which impact ability to perform ADLs (bathing, care of mouth/teeth, toileting, grooming, dressing, etc )  - Assess/evaluate cause of self-care deficits   - Assess range of motion  - Assess patient's mobility; develop plan if impaired  - Assess patient's need for assistive devices and provide as appropriate  - Encourage maximum independence but intervene and supervise when necessary  - Involve family in performance of ADLs  - Assess for home care needs following discharge   - Consider OT consult to assist with ADL evaluation and planning for discharge  - Provide patient education as appropriate  Outcome: Progressing  Goal: Maintains/Returns to pre admission functional level  Description: INTERVENTIONS:  - Perform BMAT or MOVE assessment daily    - Set and communicate daily mobility goal to care team and patient/family/caregiver     - Collaborate with rehabilitation services on mobility goals if consulted  - Out of bed for toileting  - Record patient progress and toleration of activity level   Outcome: Progressing     Problem: DISCHARGE PLANNING  Goal: Discharge to home or other facility with appropriate resources  Description: INTERVENTIONS:  - Identify barriers to discharge w/patient and caregiver  - Arrange for needed discharge resources and transportation as appropriate  - Identify discharge learning needs (meds, wound care, etc )  - Arrange for interpretive services to assist at discharge as needed  - Refer to Case Management Department for coordinating discharge planning if the patient needs post-hospital services based on physician/advanced practitioner order or complex needs related to functional status, cognitive ability, or social support system  Outcome: Progressing     Problem: Knowledge Deficit  Goal: Patient/family/caregiver demonstrates understanding of disease process, treatment plan, medications, and discharge instructions  Description: Complete learning assessment and assess knowledge base    Interventions:  - Provide teaching at level of understanding  - Provide teaching via preferred learning methods  Outcome: Progressing

## 2021-08-19 NOTE — ASSESSMENT & PLAN NOTE
Wt Readings from Last 3 Encounters:   08/15/21 92 1 kg (203 lb)   08/13/21 92 2 kg (203 lb 4 oz)   07/25/21 98 7 kg (217 lb 9 5 oz)     Follows with cardiology Dr Susan Samaniego and Dr Ronald Kimble [facility])  No signs of volume overload at this time  Dry weight as of currently as  203 lb      Plan:     Restart Lasix and Entresto tomorrow repeat BMP in 1 week patient advised to see PCP asap upon discharge

## 2021-08-19 NOTE — ASSESSMENT & PLAN NOTE
Self reported "coffee ground" emesis x2 weeks - denies melena/bloody stools  H/o cholecystectomy  States epigastric pain worse with food intake, has 7/10 baseline epigastric pain occasionally 10/10 with radiation to the back  Plan:  · Diet advanced, healthy choices encouraged denies alcohol consumption  · Fecal occult blood negative  Protonix 40 daily      · GI scheduling EGD colonoscopy outpatient  Iron studies WNL CRP<3 0  · FOB negative

## 2021-08-19 NOTE — ASSESSMENT & PLAN NOTE
Follows with cardiology as above  Currently rhythm irregular, rate controlled     Follow-up with PCP asap upon discharge     Plan:  Continue home medications 08/20/2021

## 2021-08-20 DIAGNOSIS — I12.9 BENIGN HYPERTENSION WITH CKD (CHRONIC KIDNEY DISEASE) STAGE III (HCC): ICD-10-CM

## 2021-08-20 DIAGNOSIS — M79.89 LEG SWELLING: ICD-10-CM

## 2021-08-20 DIAGNOSIS — N18.30 BENIGN HYPERTENSION WITH CKD (CHRONIC KIDNEY DISEASE) STAGE III (HCC): ICD-10-CM

## 2021-08-20 RX ORDER — FUROSEMIDE 20 MG/1
20 TABLET ORAL DAILY
Qty: 30 TABLET | Refills: 5 | Status: SHIPPED | OUTPATIENT
Start: 2021-08-20 | End: 2022-03-28

## 2021-08-20 NOTE — UTILIZATION REVIEW
Notification of Discharge   This is a Notification of Discharge from our facility [address]  Please be advised that this patient has been discharge from our facility  Below you will find the admission and discharge date and time including the patients disposition  UTILIZATION REVIEW CONTACT:  Almas Treviño  Utilization   Network Utilization Review Department  Phone: [phone] x carefully listen to the prompts  All voicemails are confidential   Email: [email or web address]     PHYSICIAN ADVISORY SERVICES:  FOR BVIN-WX-YLRU REVIEW - MEDICAL NECESSITY DENIAL  Phone: [phone]  Fax: [phone]  Email: [email or web address]     PRESENTATION DATE: 8/15/2021  6:25 PM  OBERVATION ADMISSION DATE:   INPATIENT ADMISSION DATE: 8/15/21  9:20 PM   DISCHARGE DATE: 2021 12:23 PM  DISPOSITION: Home/Self Care Home/Self Care      IMPORTANT INFORMATION:  Send all requests for admission clinical reviews, approved or denied determinations and any other requests to dedicated fax number below belonging to the campus where the patient is receiving treatment   List of dedicated fax numbers:  [address] DENIALS (Administrative/Medical Necessity) [phone]   [address] (Maternity/NICU/Pediatrics) [phone]   Jacob  [phone]   Sonia Hernandez [phone]   Marisela Osborne [phone]   [address] [phone]   [facility]  [phone]   [address] [facility] [address]  [address] [facility] [address] [facility] [phone]

## 2021-08-25 ENCOUNTER — REMOTE DEVICE CLINIC VISIT (OUTPATIENT)
Dept: CARDIOLOGY CLINIC | Facility: CLINIC | Age: 56
End: 2021-08-25
Payer: COMMERCIAL

## 2021-08-25 ENCOUNTER — TELEPHONE (OUTPATIENT)
Dept: CARDIOLOGY CLINIC | Facility: CLINIC | Age: 56
End: 2021-08-25

## 2021-08-25 DIAGNOSIS — Z95.810 AICD (AUTOMATIC CARDIOVERTER/DEFIBRILLATOR) PRESENT: Primary | ICD-10-CM

## 2021-08-25 PROCEDURE — 93296 REM INTERROG EVL PM/IDS: CPT | Performed by: INTERNAL MEDICINE

## 2021-08-25 PROCEDURE — 93295 DEV INTERROG REMOTE 1/2/MLT: CPT | Performed by: INTERNAL MEDICINE

## 2021-08-25 NOTE — PROGRESS NOTES
Results for orders placed or performed in visit on 08/25/21   Cardiac EP device report    Narrative    MDT-DUAL CHAMBER ICD (AAI-DDD MODE) - ACTIVE SYSTEM IS MRI CONDITIONAL  CARELINK TRANSMISSION: BATTERY STATUS "4 YRS " AP 90%  1%  ALL AVAILABLE LEAD PARAMETERS WITHIN NORMAL LIMITS  418 AT/AF NOTED; 13% BURDEN  2 VHRS NOTED; NO THERAPIES GIVEN  PT ON AMIO, METO SUCC, & XARELTO  EF 30% (2021)  AVAIL EGRAMS PRESENT AS SVT & AF  OPTI-VOL FLUID THRESHOLD CROSSED  HF/RN MADE AWARE  NORMAL DEVICE FUNCTION   NC         Current Outpatient Medications:     albuterol (PROVENTIL HFA,VENTOLIN HFA) 90 mcg/act inhaler, Inhale 1-2 puffs every 6 (six) hours as needed for wheezing, Disp: 1 Inhaler, Rfl: 0    amiodarone 200 mg tablet, CALL MD VERIFY SIG-PT HAS ALREADY DONE TITRATION-SHOULD THIS BE 1 TABLET ONCE DAILY ???, Disp: 90 tablet, Rfl: 3    EPINEPHrine (EPIPEN) 0 3 mg/0 3 mL SOAJ, Inject 0 3 mL (0 3 mg total) into a muscle once for 1 dose For severe allergic reaction (Patient not taking: Reported on 8/13/2021), Disp: 1 each, Rfl: 0    furosemide (LASIX) 20 mg tablet, Take 1 tablet (20 mg total) by mouth daily, Disp: 30 tablet, Rfl: 5    metoprolol succinate (TOPROL-XL) 100 mg 24 hr tablet, TAKE 1 AND 1/2 TABLETS 2 TIMES A DAY, Disp: 135 tablet, Rfl: 3    nystatin (MYCOSTATIN) powder, Apply topically 2 (two) times a day, Disp: 15 g, Rfl: 0    pantoprazole (PROTONIX) 40 mg tablet, Take 1 tablet (40 mg total) by mouth daily, Disp: 30 tablet, Rfl: 0    sacubitril-valsartan (Entresto) 49-51 MG TABS, Take 1 tablet by mouth 2 (two) times a day, Disp: 60 tablet, Rfl: 0    sucralfate (CARAFATE) 1 g tablet, Take 1 tablet (1 g total) by mouth 4 (four) times a day (before meals and at bedtime), Disp: 120 tablet, Rfl: 0    traMADol (ULTRAM) 50 mg tablet, take 1 tablet by mouth twice a day if needed for MODERATE PAIN ( PAIN SCALE 4-6 ), Disp: , Rfl:     Xarelto 20 MG tablet, take 1 tablet by mouth every evening, Disp: 30 tablet, Rfl: 1

## 2021-08-25 NOTE — TELEPHONE ENCOUNTER
S/w Charisma, denies SOB or weight gain  States she is losing weight over the past 2 weeks  Normally she is 210 lbs but is now 203 lbs  Documentation in Dr Lokesh Elkins most recent note has her weight at 203 lbs  Not weighing herself consistently at home  C/o B/L calf edema for a few days and increased urine output at night  Denies any orthopnea  Patient was admitted at [facility] from 8/15-8/19 for flank pain and found to be in DANIELA, IV fluids were given  Order to have a BMP which has not been completed yet  Currently taking Lasix 20 mg daily and Entresto 49-51mg BID  O/v w/ Dr Thelma Banegas 10/8 and BP check at [facility] 8/27  I asked patient is she felt she was filling up w/ fluid and she felt like she was

## 2021-08-25 NOTE — TELEPHONE ENCOUNTER
----- Message from Alejo Stallworth sent at 8/25/2021 12:30 PM EDT -----    ----- Message -----  From: Renato Cabrera  Sent: 8/25/2021  11:10 AM EDT  To: Cardiology [place] [facility]    Optivol is up

## 2021-08-26 DIAGNOSIS — I50.20 SYSTOLIC CONGESTIVE HEART FAILURE, UNSPECIFIED HF CHRONICITY (HCC): Primary | ICD-10-CM

## 2021-08-26 NOTE — TELEPHONE ENCOUNTER
S/w Ronaldo Gallardo, she plans on getting BMP tomorrow after she has her BP check at [place]  She will hold off on taking extra diuretics until labs come back, says she is feeling fine, slight edema today  I advised her I will keep an eye on labs and will touch base w/ her once I have results

## 2021-08-26 NOTE — TELEPHONE ENCOUNTER
I would say she needs to get labs ASAP so we can trend kidney function  Plus sounds like she is having ongoing urinary symptoms? She can try taking an extra Lasix for a day or two if the edema is bothersome  Wouldn't be too aggressive until we see labs      Babatunde Winn

## 2021-08-27 ENCOUNTER — TELEPHONE (OUTPATIENT)
Dept: CARDIOLOGY CLINIC | Facility: CLINIC | Age: 56
End: 2021-08-27

## 2021-08-27 ENCOUNTER — APPOINTMENT (OUTPATIENT)
Dept: LAB | Facility: CLINIC | Age: 56
End: 2021-08-27
Payer: COMMERCIAL

## 2021-08-27 ENCOUNTER — OFFICE VISIT (OUTPATIENT)
Dept: CARDIOLOGY CLINIC | Facility: CLINIC | Age: 56
End: 2021-08-27
Payer: COMMERCIAL

## 2021-08-27 VITALS
OXYGEN SATURATION: 98 % | HEART RATE: 64 BPM | DIASTOLIC BLOOD PRESSURE: 62 MMHG | RESPIRATION RATE: 18 BRPM | BODY MASS INDEX: 32.79 KG/M2 | WEIGHT: 212.5 LBS | SYSTOLIC BLOOD PRESSURE: 130 MMHG

## 2021-08-27 DIAGNOSIS — I50.42 CHRONIC COMBINED SYSTOLIC AND DIASTOLIC CONGESTIVE HEART FAILURE (HCC): Primary | ICD-10-CM

## 2021-08-27 LAB
ANION GAP SERPL CALCULATED.3IONS-SCNC: 11 MMOL/L (ref 4–13)
BUN SERPL-MCNC: 24 MG/DL (ref 5–25)
CALCIUM SERPL-MCNC: 8.9 MG/DL (ref 8.3–10.1)
CHLORIDE SERPL-SCNC: 106 MMOL/L (ref 100–108)
CO2 SERPL-SCNC: 27 MMOL/L (ref 21–32)
CREAT SERPL-MCNC: 2.29 MG/DL (ref 0.6–1.3)
GFR SERPL CREATININE-BSD FRML MDRD: 23 ML/MIN/1.73SQ M
GLUCOSE SERPL-MCNC: 219 MG/DL (ref 65–140)
POTASSIUM SERPL-SCNC: 3.7 MMOL/L (ref 3.5–5.3)
SODIUM SERPL-SCNC: 144 MMOL/L (ref 136–145)

## 2021-08-27 PROCEDURE — 80048 BASIC METABOLIC PNL TOTAL CA: CPT | Performed by: INTERNAL MEDICINE

## 2021-08-27 PROCEDURE — 36415 COLL VENOUS BLD VENIPUNCTURE: CPT | Performed by: INTERNAL MEDICINE

## 2021-08-27 PROCEDURE — 99213 OFFICE O/P EST LOW 20 MIN: CPT | Performed by: INTERNAL MEDICINE

## 2021-08-27 NOTE — TELEPHONE ENCOUNTER
Please see nurse visit for blood pressure check done today  BP was 130/62 after starting entresto 49/51mg bid

## 2021-08-30 ENCOUNTER — TELEPHONE (OUTPATIENT)
Dept: CARDIOLOGY CLINIC | Facility: CLINIC | Age: 56
End: 2021-08-30

## 2021-08-30 NOTE — TELEPHONE ENCOUNTER
Please see telephone encounter from 8/25 for elevated Optivol  Patient wanted to hold off increasing Lasix until BMP completed  I attempted to reach patient to see how she is doing-- left message asking for a return call

## 2021-08-30 NOTE — TELEPHONE ENCOUNTER
Jai Libia called back  C/o centralized CP which started about an hour after she got off the phone w/ me this morning  Denies any SOB w/ it or arm pain  No nausea or vomiting  States pain is worse w/ ambulation  Denies anything that could of caused pain   Patient is on her way to [facility] ED for eval

## 2021-08-30 NOTE — TELEPHONE ENCOUNTER
Albert Flores returned my call  She is no longer c/o LE edema  C/o swelling in hands upon waking up in the am but resolves within 1 hour  Denies any SOB  Does not weigh herself daily but states she will start  C/o a head cold over the weekend but states she is better today  States she sticks to a low sodium diet and monitors her fluid intake  I voiced my concern that if she went into HF she could not recognize the symptoms, patient then voiced her concern about the "heat"  I asked patient about HF symptoms, she did not know  We review symptoms and when to call the office  Do you still want her to hold Lasix? Repeat labs?

## 2021-08-30 NOTE — TELEPHONE ENCOUNTER
----- Message from Kris Cook MD sent at 8/27/2021  4:11 PM EDT -----  Please ask her to to hold lasix at this time

## 2021-09-26 ENCOUNTER — HOSPITAL ENCOUNTER (EMERGENCY)
Facility: HOSPITAL | Age: 56
Discharge: HOME | End: 2021-09-26
Attending: EMERGENCY MEDICINE | Admitting: EMERGENCY MEDICINE
Payer: COMMERCIAL

## 2021-09-26 ENCOUNTER — APPOINTMENT (EMERGENCY)
Dept: CT IMAGING | Facility: HOSPITAL | Age: 56
End: 2021-09-26
Payer: COMMERCIAL

## 2021-09-26 VITALS
SYSTOLIC BLOOD PRESSURE: 210 MMHG | RESPIRATION RATE: 20 BRPM | WEIGHT: 210 LBS | TEMPERATURE: 97.6 F | DIASTOLIC BLOOD PRESSURE: 111 MMHG | OXYGEN SATURATION: 100 % | HEART RATE: 57 BPM | BODY MASS INDEX: 32.96 KG/M2 | HEIGHT: 67 IN

## 2021-09-26 DIAGNOSIS — N39.0 UTI (URINARY TRACT INFECTION): ICD-10-CM

## 2021-09-26 DIAGNOSIS — R10.9 RIGHT SIDED ABDOMINAL PAIN: Primary | ICD-10-CM

## 2021-09-26 LAB
ALBUMIN SERPL BCP-MCNC: 4.5 G/DL (ref 3.4–4.8)
ALP SERPL-CCNC: 66.9 U/L (ref 35–140)
ALT SERPL W P-5'-P-CCNC: 13 U/L (ref 5–54)
ANION GAP SERPL CALCULATED.3IONS-SCNC: 7 MMOL/L (ref 4–13)
AST SERPL W P-5'-P-CCNC: 21 U/L (ref 15–41)
BACTERIA UR QL AUTO: ABNORMAL /HPF
BASOPHILS # BLD AUTO: 0.04 THOUSANDS/ΜL (ref 0–0.1)
BASOPHILS NFR BLD AUTO: 1 % (ref 0–1)
BILIRUB SERPL-MCNC: 0.48 MG/DL (ref 0.3–1.2)
BILIRUB UR QL STRIP: NEGATIVE
BUN SERPL-MCNC: 17 MG/DL (ref 6–20)
CALCIUM SERPL-MCNC: 9.3 MG/DL (ref 8.4–10.2)
CHLORIDE SERPL-SCNC: 103 MMOL/L (ref 96–108)
CLARITY UR: ABNORMAL
CO2 SERPL-SCNC: 31 MMOL/L (ref 22–33)
COLOR UR: YELLOW
CREAT SERPL-MCNC: 1.78 MG/DL (ref 0.4–1.1)
EOSINOPHIL # BLD AUTO: 0.15 THOUSAND/ΜL (ref 0–0.61)
EOSINOPHIL NFR BLD AUTO: 2 % (ref 0–6)
ERYTHROCYTE [DISTWIDTH] IN BLOOD BY AUTOMATED COUNT: 13.8 % (ref 11.6–15.1)
GFR SERPL CREATININE-BSD FRML MDRD: 31 ML/MIN/1.73SQ M
GLUCOSE SERPL-MCNC: 78 MG/DL (ref 65–140)
GLUCOSE UR STRIP-MCNC: NEGATIVE MG/DL
HCT VFR BLD AUTO: 42.2 % (ref 34.8–46.1)
HGB BLD-MCNC: 13.6 G/DL (ref 11.5–15.4)
HGB UR QL STRIP.AUTO: NEGATIVE
IMM GRANULOCYTES # BLD AUTO: 0.01 THOUSAND/UL (ref 0–0.2)
IMM GRANULOCYTES NFR BLD AUTO: 0 % (ref 0–2)
KETONES UR STRIP-MCNC: NEGATIVE MG/DL
LEUKOCYTE ESTERASE UR QL STRIP: NEGATIVE
LYMPHOCYTES # BLD AUTO: 1.99 THOUSANDS/ΜL (ref 0.6–4.47)
LYMPHOCYTES NFR BLD AUTO: 29 % (ref 14–44)
MCH RBC QN AUTO: 28.4 PG (ref 26.8–34.3)
MCHC RBC AUTO-ENTMCNC: 32.2 G/DL (ref 31.4–37.4)
MCV RBC AUTO: 88 FL (ref 82–98)
MONOCYTES # BLD AUTO: 0.65 THOUSAND/ΜL (ref 0.17–1.22)
MONOCYTES NFR BLD AUTO: 9 % (ref 4–12)
NEUTROPHILS # BLD AUTO: 4.07 THOUSANDS/ΜL (ref 1.85–7.62)
NEUTS SEG NFR BLD AUTO: 59 % (ref 43–75)
NITRITE UR QL STRIP: NEGATIVE
NON-SQ EPI CELLS URNS QL MICRO: ABNORMAL /HPF
PH UR STRIP.AUTO: 6.5 [PH]
PLATELET # BLD AUTO: 187 THOUSANDS/UL (ref 149–390)
PMV BLD AUTO: 12.2 FL (ref 8.9–12.7)
POTASSIUM SERPL-SCNC: 4.5 MMOL/L (ref 3.5–5)
PROT SERPL-MCNC: 7.7 G/DL (ref 6.4–8.3)
PROT UR STRIP-MCNC: ABNORMAL MG/DL
RBC # BLD AUTO: 4.79 MILLION/UL (ref 3.81–5.12)
RBC #/AREA URNS AUTO: ABNORMAL /HPF
SODIUM SERPL-SCNC: 141 MMOL/L (ref 133–145)
SP GR UR STRIP.AUTO: 1.02 (ref 1–1.03)
UROBILINOGEN UR QL STRIP.AUTO: 1 E.U./DL
WBC # BLD AUTO: 6.91 THOUSAND/UL (ref 4.31–10.16)
WBC #/AREA URNS AUTO: ABNORMAL /HPF

## 2021-09-26 PROCEDURE — 81003 URINALYSIS AUTO W/O SCOPE: CPT | Performed by: PHYSICIAN ASSISTANT

## 2021-09-26 PROCEDURE — 96361 HYDRATE IV INFUSION ADD-ON: CPT

## 2021-09-26 PROCEDURE — 99284 EMERGENCY DEPT VISIT MOD MDM: CPT

## 2021-09-26 PROCEDURE — 80053 COMPREHEN METABOLIC PANEL: CPT | Performed by: PHYSICIAN ASSISTANT

## 2021-09-26 PROCEDURE — 36415 COLL VENOUS BLD VENIPUNCTURE: CPT | Performed by: PHYSICIAN ASSISTANT

## 2021-09-26 PROCEDURE — 81001 URINALYSIS AUTO W/SCOPE: CPT | Performed by: PHYSICIAN ASSISTANT

## 2021-09-26 PROCEDURE — 96374 THER/PROPH/DIAG INJ IV PUSH: CPT

## 2021-09-26 PROCEDURE — G1004 CDSM NDSC: HCPCS

## 2021-09-26 PROCEDURE — 74176 CT ABD & PELVIS W/O CONTRAST: CPT

## 2021-09-26 PROCEDURE — 99284 EMERGENCY DEPT VISIT MOD MDM: CPT | Performed by: PHYSICIAN ASSISTANT

## 2021-09-26 PROCEDURE — 85025 COMPLETE CBC W/AUTO DIFF WBC: CPT | Performed by: PHYSICIAN ASSISTANT

## 2021-09-26 RX ORDER — ONDANSETRON 2 MG/ML
4 INJECTION INTRAMUSCULAR; INTRAVENOUS ONCE
Status: COMPLETED | OUTPATIENT
Start: 2021-09-26 | End: 2021-09-26

## 2021-09-26 RX ORDER — CEPHALEXIN 500 MG/1
500 CAPSULE ORAL 3 TIMES DAILY
Qty: 21 CAPSULE | Refills: 0 | Status: SHIPPED | OUTPATIENT
Start: 2021-09-26 | End: 2021-09-26 | Stop reason: SDUPTHER

## 2021-09-26 RX ORDER — OXYCODONE HYDROCHLORIDE AND ACETAMINOPHEN 5; 325 MG/1; MG/1
1 TABLET ORAL ONCE
Status: COMPLETED | OUTPATIENT
Start: 2021-09-26 | End: 2021-09-26

## 2021-09-26 RX ORDER — CEPHALEXIN 500 MG/1
500 CAPSULE ORAL 3 TIMES DAILY
Qty: 21 CAPSULE | Refills: 0 | Status: SHIPPED | OUTPATIENT
Start: 2021-09-26 | End: 2021-10-03

## 2021-09-26 RX ADMIN — SODIUM CHLORIDE 1000 ML: 0.9 INJECTION, SOLUTION INTRAVENOUS at 17:32

## 2021-09-26 RX ADMIN — ONDANSETRON 4 MG: 2 INJECTION INTRAMUSCULAR; INTRAVENOUS at 17:35

## 2021-09-26 RX ADMIN — OXYCODONE HYDROCHLORIDE AND ACETAMINOPHEN 1 TABLET: 5; 325 TABLET ORAL at 17:32

## 2021-10-05 DIAGNOSIS — I48.91 ATRIAL FIBRILLATION, UNSPECIFIED TYPE (HCC): Chronic | ICD-10-CM

## 2021-10-05 RX ORDER — RIVAROXABAN 20 MG/1
TABLET, FILM COATED ORAL
Qty: 30 TABLET | Refills: 1 | Status: SHIPPED | OUTPATIENT
Start: 2021-10-05 | End: 2021-12-06

## 2021-10-09 ENCOUNTER — HOSPITAL ENCOUNTER (EMERGENCY)
Facility: HOSPITAL | Age: 56
Discharge: HOME | End: 2021-10-09
Payer: COMMERCIAL

## 2021-10-09 ENCOUNTER — APPOINTMENT (EMERGENCY)
Dept: RADIOLOGY | Facility: HOSPITAL | Age: 56
End: 2021-10-09
Payer: COMMERCIAL

## 2021-10-09 VITALS
DIASTOLIC BLOOD PRESSURE: 91 MMHG | SYSTOLIC BLOOD PRESSURE: 219 MMHG | HEIGHT: 67 IN | BODY MASS INDEX: 33.22 KG/M2 | WEIGHT: 211.64 LBS | HEART RATE: 59 BPM | TEMPERATURE: 97.7 F | RESPIRATION RATE: 18 BRPM | OXYGEN SATURATION: 100 %

## 2021-10-09 DIAGNOSIS — S80.01XA CONTUSION OF RIGHT KNEE, INITIAL ENCOUNTER: Primary | ICD-10-CM

## 2021-10-09 DIAGNOSIS — T14.8XXA SKIN AVULSION: ICD-10-CM

## 2021-10-09 PROCEDURE — 99284 EMERGENCY DEPT VISIT MOD MDM: CPT | Performed by: PHYSICIAN ASSISTANT

## 2021-10-09 PROCEDURE — 99283 EMERGENCY DEPT VISIT LOW MDM: CPT

## 2021-10-09 PROCEDURE — 73564 X-RAY EXAM KNEE 4 OR MORE: CPT

## 2021-10-09 RX ORDER — OXYCODONE HYDROCHLORIDE AND ACETAMINOPHEN 5; 325 MG/1; MG/1
1 TABLET ORAL ONCE
Status: COMPLETED | OUTPATIENT
Start: 2021-10-09 | End: 2021-10-09

## 2021-10-09 RX ORDER — OXYCODONE HYDROCHLORIDE AND ACETAMINOPHEN 5; 325 MG/1; MG/1
1 TABLET ORAL EVERY 6 HOURS PRN
Qty: 10 TABLET | Refills: 0 | Status: SHIPPED | OUTPATIENT
Start: 2021-10-09 | End: 2021-10-12

## 2021-10-09 RX ADMIN — OXYCODONE HYDROCHLORIDE AND ACETAMINOPHEN 1 TABLET: 5; 325 TABLET ORAL at 16:04

## 2021-11-30 ENCOUNTER — HOSPITAL ENCOUNTER (EMERGENCY)
Facility: HOSPITAL | Age: 56
Discharge: HOME | End: 2021-11-30
Payer: COMMERCIAL

## 2021-11-30 VITALS
OXYGEN SATURATION: 99 % | BODY MASS INDEX: 33.05 KG/M2 | SYSTOLIC BLOOD PRESSURE: 157 MMHG | HEART RATE: 59 BPM | DIASTOLIC BLOOD PRESSURE: 93 MMHG | WEIGHT: 211 LBS | TEMPERATURE: 98.1 F | RESPIRATION RATE: 20 BRPM

## 2021-11-30 DIAGNOSIS — U07.1 COVID-19: Primary | ICD-10-CM

## 2021-11-30 LAB
ALBUMIN SERPL BCP-MCNC: 4 G/DL (ref 3.4–4.8)
ALP SERPL-CCNC: 47 U/L (ref 35–140)
ALT SERPL W P-5'-P-CCNC: 11 U/L (ref 5–54)
ANION GAP SERPL CALCULATED.3IONS-SCNC: 8 MMOL/L (ref 4–13)
AST SERPL W P-5'-P-CCNC: 14 U/L (ref 15–41)
BACTERIA UR QL AUTO: ABNORMAL /HPF
BASOPHILS # BLD AUTO: 0.01 THOUSANDS/ΜL (ref 0–0.1)
BASOPHILS NFR BLD AUTO: 0 % (ref 0–1)
BILIRUB SERPL-MCNC: 0.3 MG/DL (ref 0.3–1.2)
BILIRUB UR QL STRIP: NEGATIVE
BUN SERPL-MCNC: 15 MG/DL (ref 6–20)
CALCIUM SERPL-MCNC: 8.9 MG/DL (ref 8.4–10.2)
CHLORIDE SERPL-SCNC: 105 MMOL/L (ref 96–108)
CLARITY UR: CLEAR
CO2 SERPL-SCNC: 28 MMOL/L (ref 22–33)
COLOR UR: YELLOW
CREAT SERPL-MCNC: 1.64 MG/DL (ref 0.4–1.1)
EOSINOPHIL # BLD AUTO: 0.02 THOUSAND/ΜL (ref 0–0.61)
EOSINOPHIL NFR BLD AUTO: 1 % (ref 0–6)
ERYTHROCYTE [DISTWIDTH] IN BLOOD BY AUTOMATED COUNT: 14 % (ref 11.6–15.1)
FLUAV RNA RESP QL NAA+PROBE: NEGATIVE
FLUBV RNA RESP QL NAA+PROBE: NEGATIVE
GFR SERPL CREATININE-BSD FRML MDRD: 35 ML/MIN/1.73SQ M
GLUCOSE SERPL-MCNC: 92 MG/DL (ref 65–140)
GLUCOSE UR STRIP-MCNC: NEGATIVE MG/DL
HCT VFR BLD AUTO: 42.3 % (ref 34.8–46.1)
HGB BLD-MCNC: 13.4 G/DL (ref 11.5–15.4)
HGB UR QL STRIP.AUTO: NEGATIVE
IMM GRANULOCYTES # BLD AUTO: 0.01 THOUSAND/UL (ref 0–0.2)
IMM GRANULOCYTES NFR BLD AUTO: 0 % (ref 0–2)
KETONES UR STRIP-MCNC: NEGATIVE MG/DL
LEUKOCYTE ESTERASE UR QL STRIP: NEGATIVE
LIPASE SERPL-CCNC: 151 U/L (ref 13–60)
LYMPHOCYTES # BLD AUTO: 1.34 THOUSANDS/ΜL (ref 0.6–4.47)
LYMPHOCYTES NFR BLD AUTO: 31 % (ref 14–44)
MCH RBC QN AUTO: 27.3 PG (ref 26.8–34.3)
MCHC RBC AUTO-ENTMCNC: 31.7 G/DL (ref 31.4–37.4)
MCV RBC AUTO: 86 FL (ref 82–98)
MONOCYTES # BLD AUTO: 0.4 THOUSAND/ΜL (ref 0.17–1.22)
MONOCYTES NFR BLD AUTO: 9 % (ref 4–12)
NEUTROPHILS # BLD AUTO: 2.58 THOUSANDS/ΜL (ref 1.85–7.62)
NEUTS SEG NFR BLD AUTO: 59 % (ref 43–75)
NITRITE UR QL STRIP: NEGATIVE
NON-SQ EPI CELLS URNS QL MICRO: ABNORMAL /HPF
PH UR STRIP.AUTO: 5.5 [PH]
PLATELET # BLD AUTO: 107 THOUSANDS/UL (ref 149–390)
PMV BLD AUTO: 11.4 FL (ref 8.9–12.7)
POTASSIUM SERPL-SCNC: 4 MMOL/L (ref 3.5–5)
PROT SERPL-MCNC: 6.8 G/DL (ref 6.4–8.3)
PROT UR STRIP-MCNC: ABNORMAL MG/DL
RBC # BLD AUTO: 4.91 MILLION/UL (ref 3.81–5.12)
RBC #/AREA URNS AUTO: ABNORMAL /HPF
RSV RNA RESP QL NAA+PROBE: NEGATIVE
SARS-COV-2 RNA RESP QL NAA+PROBE: POSITIVE
SODIUM SERPL-SCNC: 141 MMOL/L (ref 133–145)
SP GR UR STRIP.AUTO: 1.02 (ref 1–1.03)
UROBILINOGEN UR QL STRIP.AUTO: 0.2 E.U./DL
WBC # BLD AUTO: 4.36 THOUSAND/UL (ref 4.31–10.16)
WBC #/AREA URNS AUTO: ABNORMAL /HPF

## 2021-11-30 PROCEDURE — 85025 COMPLETE CBC W/AUTO DIFF WBC: CPT | Performed by: PHYSICIAN ASSISTANT

## 2021-11-30 PROCEDURE — 83690 ASSAY OF LIPASE: CPT | Performed by: PHYSICIAN ASSISTANT

## 2021-11-30 PROCEDURE — 99284 EMERGENCY DEPT VISIT MOD MDM: CPT | Performed by: PHYSICIAN ASSISTANT

## 2021-11-30 PROCEDURE — 96374 THER/PROPH/DIAG INJ IV PUSH: CPT

## 2021-11-30 PROCEDURE — 80053 COMPREHEN METABOLIC PANEL: CPT | Performed by: PHYSICIAN ASSISTANT

## 2021-11-30 PROCEDURE — 36415 COLL VENOUS BLD VENIPUNCTURE: CPT | Performed by: PHYSICIAN ASSISTANT

## 2021-11-30 PROCEDURE — 81001 URINALYSIS AUTO W/SCOPE: CPT | Performed by: PHYSICIAN ASSISTANT

## 2021-11-30 PROCEDURE — 0241U HB NFCT DS VIR RESP RNA 4 TRGT: CPT | Performed by: PHYSICIAN ASSISTANT

## 2021-11-30 PROCEDURE — 96361 HYDRATE IV INFUSION ADD-ON: CPT

## 2021-11-30 PROCEDURE — 99283 EMERGENCY DEPT VISIT LOW MDM: CPT

## 2021-11-30 RX ORDER — ONDANSETRON 2 MG/ML
4 INJECTION INTRAMUSCULAR; INTRAVENOUS ONCE
Status: COMPLETED | OUTPATIENT
Start: 2021-11-30 | End: 2021-11-30

## 2021-11-30 RX ORDER — ACETAMINOPHEN 325 MG/1
975 TABLET ORAL ONCE
Status: COMPLETED | OUTPATIENT
Start: 2021-11-30 | End: 2021-11-30

## 2021-11-30 RX ADMIN — ONDANSETRON 4 MG: 2 INJECTION INTRAMUSCULAR; INTRAVENOUS at 17:32

## 2021-11-30 RX ADMIN — SODIUM CHLORIDE 1000 ML: 0.9 INJECTION, SOLUTION INTRAVENOUS at 17:34

## 2021-11-30 RX ADMIN — ACETAMINOPHEN 975 MG: 325 TABLET, FILM COATED ORAL at 18:35

## 2021-12-01 ENCOUNTER — TELEMEDICINE (OUTPATIENT)
Dept: FAMILY MEDICINE CLINIC | Facility: CLINIC | Age: 56
End: 2021-12-01
Payer: COMMERCIAL

## 2021-12-01 ENCOUNTER — TELEPHONE (OUTPATIENT)
Dept: FAMILY MEDICINE CLINIC | Facility: CLINIC | Age: 56
End: 2021-12-01

## 2021-12-01 DIAGNOSIS — I48.91 ATRIAL FIBRILLATION, UNSPECIFIED TYPE (HCC): ICD-10-CM

## 2021-12-01 DIAGNOSIS — I10 PRIMARY HYPERTENSION: ICD-10-CM

## 2021-12-01 DIAGNOSIS — U07.1 COVID-19 VIRUS INFECTION: Primary | ICD-10-CM

## 2021-12-01 PROCEDURE — G2012 BRIEF CHECK IN BY MD/QHP: HCPCS | Performed by: FAMILY MEDICINE

## 2021-12-01 RX ORDER — ONDANSETRON 2 MG/ML
4 INJECTION INTRAMUSCULAR; INTRAVENOUS ONCE AS NEEDED
Status: CANCELLED | OUTPATIENT
Start: 2021-12-03

## 2021-12-01 RX ORDER — SODIUM CHLORIDE 9 MG/ML
20 INJECTION, SOLUTION INTRAVENOUS ONCE
Status: CANCELLED | OUTPATIENT
Start: 2021-12-03

## 2021-12-01 RX ORDER — ACETAMINOPHEN 325 MG/1
650 TABLET ORAL ONCE AS NEEDED
Status: CANCELLED | OUTPATIENT
Start: 2021-12-03

## 2021-12-01 RX ORDER — ALBUTEROL SULFATE 90 UG/1
3 AEROSOL, METERED RESPIRATORY (INHALATION) ONCE AS NEEDED
Status: CANCELLED | OUTPATIENT
Start: 2021-12-03

## 2021-12-03 ENCOUNTER — HOSPITAL ENCOUNTER (OUTPATIENT)
Dept: INFUSION CENTER | Facility: HOSPITAL | Age: 56
Discharge: HOME | End: 2021-12-03
Payer: COMMERCIAL

## 2021-12-03 VITALS
TEMPERATURE: 96.9 F | OXYGEN SATURATION: 95 % | DIASTOLIC BLOOD PRESSURE: 108 MMHG | RESPIRATION RATE: 20 BRPM | HEART RATE: 58 BPM | SYSTOLIC BLOOD PRESSURE: 162 MMHG

## 2021-12-03 DIAGNOSIS — I48.0 PAROXYSMAL A-FIB (HCC): Primary | ICD-10-CM

## 2021-12-03 PROCEDURE — M0245 HB BAMLAN AND ETESEV INF ADMIN: HCPCS | Performed by: FAMILY MEDICINE

## 2021-12-03 RX ORDER — ONDANSETRON 2 MG/ML
4 INJECTION INTRAMUSCULAR; INTRAVENOUS ONCE AS NEEDED
Status: CANCELLED | OUTPATIENT
Start: 2021-12-03

## 2021-12-03 RX ORDER — SODIUM CHLORIDE 9 MG/ML
20 INJECTION, SOLUTION INTRAVENOUS ONCE
Status: CANCELLED | OUTPATIENT
Start: 2021-12-03

## 2021-12-03 RX ORDER — ONDANSETRON 2 MG/ML
4 INJECTION INTRAMUSCULAR; INTRAVENOUS ONCE AS NEEDED
Status: DISCONTINUED | OUTPATIENT
Start: 2021-12-03 | End: 2021-12-06 | Stop reason: HOSPADM

## 2021-12-03 RX ORDER — SODIUM CHLORIDE 9 MG/ML
20 INJECTION, SOLUTION INTRAVENOUS ONCE
Status: DISCONTINUED | OUTPATIENT
Start: 2021-12-03 | End: 2021-12-06 | Stop reason: HOSPADM

## 2021-12-03 RX ORDER — ACETAMINOPHEN 325 MG/1
650 TABLET ORAL ONCE AS NEEDED
Status: DISCONTINUED | OUTPATIENT
Start: 2021-12-03 | End: 2021-12-06 | Stop reason: HOSPADM

## 2021-12-03 RX ORDER — ALBUTEROL SULFATE 90 UG/1
3 AEROSOL, METERED RESPIRATORY (INHALATION) ONCE AS NEEDED
Status: DISCONTINUED | OUTPATIENT
Start: 2021-12-03 | End: 2021-12-06 | Stop reason: HOSPADM

## 2021-12-03 RX ORDER — ACETAMINOPHEN 325 MG/1
650 TABLET ORAL ONCE AS NEEDED
Status: CANCELLED | OUTPATIENT
Start: 2021-12-03

## 2021-12-03 RX ORDER — ALBUTEROL SULFATE 90 UG/1
3 AEROSOL, METERED RESPIRATORY (INHALATION) ONCE AS NEEDED
Status: CANCELLED | OUTPATIENT
Start: 2021-12-03

## 2021-12-03 RX ADMIN — SODIUM CHLORIDE 2100 MG COMBINED: 9 INJECTION, SOLUTION INTRAVENOUS at 09:28

## 2021-12-05 DIAGNOSIS — I48.91 ATRIAL FIBRILLATION, UNSPECIFIED TYPE (HCC): Chronic | ICD-10-CM

## 2021-12-06 RX ORDER — RIVAROXABAN 20 MG/1
TABLET, FILM COATED ORAL
Qty: 30 TABLET | Refills: 1 | Status: SHIPPED | OUTPATIENT
Start: 2021-12-06 | End: 2022-02-10

## 2021-12-09 ENCOUNTER — HOSPITAL ENCOUNTER (EMERGENCY)
Facility: HOSPITAL | Age: 56
Discharge: HOME | End: 2021-12-09
Attending: EMERGENCY MEDICINE
Payer: COMMERCIAL

## 2021-12-09 VITALS
DIASTOLIC BLOOD PRESSURE: 88 MMHG | HEART RATE: 59 BPM | RESPIRATION RATE: 16 BRPM | TEMPERATURE: 98.4 F | WEIGHT: 208 LBS | OXYGEN SATURATION: 100 % | HEIGHT: 67 IN | BODY MASS INDEX: 32.65 KG/M2 | SYSTOLIC BLOOD PRESSURE: 156 MMHG

## 2021-12-09 DIAGNOSIS — H72.91 RUPTURED TYMPANIC MEMBRANE, RIGHT: Primary | ICD-10-CM

## 2021-12-09 PROCEDURE — 99282 EMERGENCY DEPT VISIT SF MDM: CPT

## 2021-12-09 PROCEDURE — 99284 EMERGENCY DEPT VISIT MOD MDM: CPT | Performed by: EMERGENCY MEDICINE

## 2021-12-09 RX ORDER — MORPHINE SULFATE 30 MG/1
15 TABLET ORAL EVERY 4 HOURS PRN
Qty: 8 TABLET | Refills: 0 | Status: SHIPPED | OUTPATIENT
Start: 2021-12-09 | End: 2022-01-30

## 2021-12-09 RX ORDER — OFLOXACIN 3 MG/ML
10 SOLUTION/ DROPS OPHTHALMIC ONCE
Status: COMPLETED | OUTPATIENT
Start: 2021-12-09 | End: 2021-12-09

## 2021-12-09 RX ORDER — OFLOXACIN 3 MG/ML
2 SOLUTION/ DROPS OPHTHALMIC ONCE
Status: DISCONTINUED | OUTPATIENT
Start: 2021-12-09 | End: 2021-12-09

## 2021-12-09 RX ORDER — OFLOXACIN 3 MG/ML
10 SOLUTION AURICULAR (OTIC) DAILY
Qty: 10 ML | Refills: 0 | Status: SHIPPED | OUTPATIENT
Start: 2021-12-09 | End: 2021-12-16

## 2021-12-09 RX ORDER — AMOXICILLIN 250 MG/1
500 CAPSULE ORAL ONCE
Status: COMPLETED | OUTPATIENT
Start: 2021-12-09 | End: 2021-12-09

## 2021-12-09 RX ORDER — OXYCODONE HYDROCHLORIDE 5 MG/1
5 TABLET ORAL ONCE
Status: COMPLETED | OUTPATIENT
Start: 2021-12-09 | End: 2021-12-09

## 2021-12-09 RX ORDER — AMOXICILLIN 500 MG/1
500 CAPSULE ORAL EVERY 12 HOURS SCHEDULED
Qty: 14 CAPSULE | Refills: 0 | Status: SHIPPED | OUTPATIENT
Start: 2021-12-09 | End: 2021-12-16

## 2021-12-09 RX ADMIN — AMOXICILLIN 500 MG: 250 CAPSULE ORAL at 13:21

## 2021-12-09 RX ADMIN — OXYCODONE HYDROCHLORIDE 5 MG: 5 TABLET ORAL at 13:22

## 2021-12-09 RX ADMIN — OFLOXACIN 2 DROP: 3 SOLUTION/ DROPS OPHTHALMIC at 13:22

## 2021-12-09 RX ADMIN — OFLOXACIN 8 DROP: 3 SOLUTION/ DROPS OPHTHALMIC at 13:24

## 2021-12-19 DIAGNOSIS — I48.91 ATRIAL FIBRILLATION WITH RVR (HCC): ICD-10-CM

## 2021-12-20 RX ORDER — METOPROLOL SUCCINATE 100 MG/1
TABLET, EXTENDED RELEASE ORAL
Qty: 135 TABLET | Refills: 3 | Status: SHIPPED | OUTPATIENT
Start: 2021-12-20 | End: 2022-02-02 | Stop reason: HOSPADM

## 2022-01-11 ENCOUNTER — REMOTE DEVICE CLINIC VISIT (OUTPATIENT)
Dept: CARDIOLOGY CLINIC | Facility: CLINIC | Age: 57
End: 2022-01-11
Payer: COMMERCIAL

## 2022-01-11 DIAGNOSIS — Z95.810 AICD (AUTOMATIC CARDIOVERTER/DEFIBRILLATOR) PRESENT: Primary | ICD-10-CM

## 2022-01-11 PROCEDURE — 93295 DEV INTERROG REMOTE 1/2/MLT: CPT | Performed by: INTERNAL MEDICINE

## 2022-01-11 PROCEDURE — 93296 REM INTERROG EVL PM/IDS: CPT | Performed by: INTERNAL MEDICINE

## 2022-01-11 NOTE — PROGRESS NOTES
Results for orders placed or performed in visit on 01/11/22   Cardiac EP device report    Narrative    MDT-DUAL CHAMBER ICD (AAI-DDD MODE) - ACTIVE SYSTEM IS MRI CONDITIONAL  CARELINK TRANSMISSION: BATTERY STATUS "3 YRS " AP 93%  0%  ALL AVAILABLE LEAD PARAMETERS WITHIN NORMAL LIMITS  3 VHRS NOTED; NO THERAPIES GIVEN  624 AT/AF NOTED; 4% BURDEN  PT ON AMIO, METO SUCC, & XARELTO  EF 30% (2021)  OPTI-VOL WITHIN NORMAL LIMITS  NORMAL DEVICE FUNCTION   NC         Current Outpatient Medications:     albuterol (PROVENTIL HFA,VENTOLIN HFA) 90 mcg/act inhaler, Inhale 1-2 puffs every 6 (six) hours as needed for wheezing, Disp: 1 Inhaler, Rfl: 0    amiodarone 200 mg tablet, CALL MD VERIFY SIG-PT HAS ALREADY DONE TITRATION-SHOULD THIS BE 1 TABLET ONCE DAILY ???, Disp: 90 tablet, Rfl: 3    EPINEPHrine (EPIPEN) 0 3 mg/0 3 mL SOAJ, Inject 0 3 mL (0 3 mg total) into a muscle once for 1 dose For severe allergic reaction (Patient not taking: Reported on 8/13/2021), Disp: 1 each, Rfl: 0    furosemide (LASIX) 20 mg tablet, Take 1 tablet (20 mg total) by mouth daily, Disp: 30 tablet, Rfl: 5    metoprolol succinate (TOPROL-XL) 100 mg 24 hr tablet, take 1 and 1/2 tablets by mouth twice a day, Disp: 135 tablet, Rfl: 3    morphine (MSIR) 30 MG tablet, Take 0 5 tablets (15 mg total) by mouth every 4 (four) hours as needed for severe pain for up to 16 doses Max Daily Amount: 90 mg, Disp: 8 tablet, Rfl: 0    nystatin (MYCOSTATIN) powder, Apply topically 2 (two) times a day, Disp: 15 g, Rfl: 0    pantoprazole (PROTONIX) 40 mg tablet, Take 1 tablet (40 mg total) by mouth daily, Disp: 30 tablet, Rfl: 0    sacubitril-valsartan (Entresto) 49-51 MG TABS, Take 1 tablet by mouth 2 (two) times a day, Disp: 60 tablet, Rfl: 0    sucralfate (CARAFATE) 1 g tablet, Take 1 tablet (1 g total) by mouth 4 (four) times a day (before meals and at bedtime), Disp: 120 tablet, Rfl: 0    traMADol (ULTRAM) 50 mg tablet, take 1 tablet by mouth twice a day if needed for MODERATE PAIN ( PAIN SCALE 4-6 ) (Patient not taking: Reported on 8/27/2021), Disp: , Rfl:     Xarelto 20 MG tablet, take 1 tablet by mouth every evening, Disp: 30 tablet, Rfl: 1

## 2022-01-12 ENCOUNTER — HOSPITAL ENCOUNTER (OUTPATIENT)
Dept: RADIOLOGY | Facility: HOSPITAL | Age: 57
Discharge: HOME | End: 2022-01-12
Payer: COMMERCIAL

## 2022-01-12 DIAGNOSIS — M20.12 ACQUIRED HALLUX VALGUS OF LEFT FOOT: ICD-10-CM

## 2022-01-12 DIAGNOSIS — M79.672 LEFT FOOT PAIN: ICD-10-CM

## 2022-01-12 PROCEDURE — 73630 X-RAY EXAM OF FOOT: CPT

## 2022-01-22 ENCOUNTER — HOSPITAL ENCOUNTER (EMERGENCY)
Facility: HOSPITAL | Age: 57
Discharge: HOME | End: 2022-01-22
Attending: INTERNAL MEDICINE | Admitting: INTERNAL MEDICINE
Payer: COMMERCIAL

## 2022-01-22 ENCOUNTER — APPOINTMENT (EMERGENCY)
Dept: RADIOLOGY | Facility: HOSPITAL | Age: 57
End: 2022-01-22
Payer: COMMERCIAL

## 2022-01-22 VITALS
BODY MASS INDEX: 33.76 KG/M2 | HEIGHT: 67 IN | OXYGEN SATURATION: 98 % | RESPIRATION RATE: 19 BRPM | HEART RATE: 59 BPM | SYSTOLIC BLOOD PRESSURE: 198 MMHG | WEIGHT: 215.1 LBS | TEMPERATURE: 98.6 F | DIASTOLIC BLOOD PRESSURE: 90 MMHG

## 2022-01-22 DIAGNOSIS — S80.01XA CONTUSION OF RIGHT KNEE, INITIAL ENCOUNTER: Primary | ICD-10-CM

## 2022-01-22 DIAGNOSIS — M65.20 TENDONITIS, CALCIFIC: ICD-10-CM

## 2022-01-22 PROCEDURE — 73564 X-RAY EXAM KNEE 4 OR MORE: CPT

## 2022-01-22 PROCEDURE — 99282 EMERGENCY DEPT VISIT SF MDM: CPT | Performed by: PHYSICIAN ASSISTANT

## 2022-01-22 PROCEDURE — 99283 EMERGENCY DEPT VISIT LOW MDM: CPT

## 2022-01-22 NOTE — DISCHARGE INSTRUCTIONS
Please return to the emergency department for worsening symptoms including chest pain, shortness of breath, dizziness, lightheadedness, fever greater than 103, severe pain, inability to walk, fainting episodes, etc  Please follow-up with your family practice provider as soon as possible  I have sent cream over to your pharmacy for pain  Please take this medication as prescribed  Please take this medication in addition to Tylenol for further pain relief  Please follow-up with orthopedics at your earliest convenience given the calcium deposits that appear to be in your tendon of your knee
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
## 2022-01-29 ENCOUNTER — HOSPITAL ENCOUNTER (INPATIENT)
Facility: HOSPITAL | Age: 57
LOS: 3 days | Discharge: HOME | DRG: 194 | End: 2022-02-02
Attending: EMERGENCY MEDICINE | Admitting: HOSPITALIST
Payer: COMMERCIAL

## 2022-01-29 ENCOUNTER — APPOINTMENT (EMERGENCY)
Dept: RADIOLOGY | Facility: HOSPITAL | Age: 57
DRG: 194 | End: 2022-01-29
Payer: COMMERCIAL

## 2022-01-29 ENCOUNTER — APPOINTMENT (EMERGENCY)
Dept: CT IMAGING | Facility: HOSPITAL | Age: 57
DRG: 194 | End: 2022-01-29
Payer: COMMERCIAL

## 2022-01-29 DIAGNOSIS — I50.9 CHF (CONGESTIVE HEART FAILURE) (HCC): ICD-10-CM

## 2022-01-29 DIAGNOSIS — N39.0 UTI (URINARY TRACT INFECTION): ICD-10-CM

## 2022-01-29 DIAGNOSIS — R77.8 ELEVATED TROPONIN: ICD-10-CM

## 2022-01-29 DIAGNOSIS — I48.92 ATRIAL FLUTTER WITH RAPID VENTRICULAR RESPONSE (HCC): ICD-10-CM

## 2022-01-29 DIAGNOSIS — R07.9 CHEST PAIN: ICD-10-CM

## 2022-01-29 DIAGNOSIS — I49.9 ARRHYTHMIA: ICD-10-CM

## 2022-01-29 DIAGNOSIS — I16.0 HYPERTENSIVE URGENCY: ICD-10-CM

## 2022-01-29 DIAGNOSIS — I48.0 PAROXYSMAL A-FIB (HCC): ICD-10-CM

## 2022-01-29 DIAGNOSIS — I47.1 SVT (SUPRAVENTRICULAR TACHYCARDIA) (HCC): ICD-10-CM

## 2022-01-29 DIAGNOSIS — R10.9 ACUTE RIGHT FLANK PAIN: ICD-10-CM

## 2022-01-29 DIAGNOSIS — I10 UNCONTROLLED HYPERTENSION: Primary | ICD-10-CM

## 2022-01-29 LAB
ALBUMIN SERPL BCP-MCNC: 4 G/DL (ref 3.5–5)
ALP SERPL-CCNC: 69 U/L (ref 46–116)
ALT SERPL W P-5'-P-CCNC: 22 U/L (ref 12–78)
ANION GAP SERPL CALCULATED.3IONS-SCNC: 6 MMOL/L (ref 4–13)
APTT PPP: 32 SECONDS (ref 23–37)
AST SERPL W P-5'-P-CCNC: 18 U/L (ref 5–45)
BASOPHILS # BLD AUTO: 0.04 THOUSANDS/ΜL (ref 0–0.1)
BASOPHILS NFR BLD AUTO: 1 % (ref 0–1)
BILIRUB SERPL-MCNC: 0.42 MG/DL (ref 0.2–1)
BUN SERPL-MCNC: 18 MG/DL (ref 5–25)
CALCIUM SERPL-MCNC: 9.1 MG/DL (ref 8.3–10.1)
CHLORIDE SERPL-SCNC: 104 MMOL/L (ref 100–108)
CK SERPL-CCNC: 72 U/L (ref 26–192)
CO2 SERPL-SCNC: 30 MMOL/L (ref 21–32)
CREAT SERPL-MCNC: 1.69 MG/DL (ref 0.6–1.3)
EOSINOPHIL # BLD AUTO: 0.11 THOUSAND/ΜL (ref 0–0.61)
EOSINOPHIL NFR BLD AUTO: 1 % (ref 0–6)
ERYTHROCYTE [DISTWIDTH] IN BLOOD BY AUTOMATED COUNT: 14.7 % (ref 11.6–15.1)
GFR SERPL CREATININE-BSD FRML MDRD: 33 ML/MIN/1.73SQ M
GLUCOSE SERPL-MCNC: 90 MG/DL (ref 65–140)
HCT VFR BLD AUTO: 40 % (ref 34.8–46.1)
HGB BLD-MCNC: 12.8 G/DL (ref 11.5–15.4)
IMM GRANULOCYTES # BLD AUTO: 0.05 THOUSAND/UL (ref 0–0.2)
IMM GRANULOCYTES NFR BLD AUTO: 1 % (ref 0–2)
INR PPP: 1.17 (ref 0.84–1.19)
LIPASE SERPL-CCNC: 410 U/L (ref 73–393)
LYMPHOCYTES # BLD AUTO: 1.78 THOUSANDS/ΜL (ref 0.6–4.47)
LYMPHOCYTES NFR BLD AUTO: 21 % (ref 14–44)
MCH RBC QN AUTO: 28.3 PG (ref 26.8–34.3)
MCHC RBC AUTO-ENTMCNC: 32 G/DL (ref 31.4–37.4)
MCV RBC AUTO: 89 FL (ref 82–98)
MONOCYTES # BLD AUTO: 0.49 THOUSAND/ΜL (ref 0.17–1.22)
MONOCYTES NFR BLD AUTO: 6 % (ref 4–12)
NEUTROPHILS # BLD AUTO: 6.09 THOUSANDS/ΜL (ref 1.85–7.62)
NEUTS SEG NFR BLD AUTO: 70 % (ref 43–75)
NRBC BLD AUTO-RTO: 0 /100 WBCS
NT-PROBNP SERPL-MCNC: ABNORMAL PG/ML
PLATELET # BLD AUTO: 165 THOUSANDS/UL (ref 149–390)
PMV BLD AUTO: 10.6 FL (ref 8.9–12.7)
POTASSIUM SERPL-SCNC: 3.9 MMOL/L (ref 3.5–5.3)
PROT SERPL-MCNC: 8.1 G/DL (ref 6.4–8.2)
PROTHROMBIN TIME: 14.9 SECONDS (ref 11.6–14.5)
RBC # BLD AUTO: 4.52 MILLION/UL (ref 3.81–5.12)
SODIUM SERPL-SCNC: 140 MMOL/L (ref 136–145)
WBC # BLD AUTO: 8.56 THOUSAND/UL (ref 4.31–10.16)

## 2022-01-29 PROCEDURE — 85025 COMPLETE CBC W/AUTO DIFF WBC: CPT | Performed by: EMERGENCY MEDICINE

## 2022-01-29 PROCEDURE — 71045 X-RAY EXAM CHEST 1 VIEW: CPT

## 2022-01-29 PROCEDURE — 85730 THROMBOPLASTIN TIME PARTIAL: CPT | Performed by: EMERGENCY MEDICINE

## 2022-01-29 PROCEDURE — 84484 ASSAY OF TROPONIN QUANT: CPT | Performed by: EMERGENCY MEDICINE

## 2022-01-29 PROCEDURE — 87040 BLOOD CULTURE FOR BACTERIA: CPT | Performed by: EMERGENCY MEDICINE

## 2022-01-29 PROCEDURE — 80053 COMPREHEN METABOLIC PANEL: CPT | Performed by: EMERGENCY MEDICINE

## 2022-01-29 PROCEDURE — 96375 TX/PRO/DX INJ NEW DRUG ADDON: CPT

## 2022-01-29 PROCEDURE — 83690 ASSAY OF LIPASE: CPT | Performed by: EMERGENCY MEDICINE

## 2022-01-29 PROCEDURE — G1004 CDSM NDSC: HCPCS

## 2022-01-29 PROCEDURE — 71250 CT THORAX DX C-: CPT

## 2022-01-29 PROCEDURE — 83880 ASSAY OF NATRIURETIC PEPTIDE: CPT | Performed by: EMERGENCY MEDICINE

## 2022-01-29 PROCEDURE — 99291 CRITICAL CARE FIRST HOUR: CPT | Performed by: EMERGENCY MEDICINE

## 2022-01-29 PROCEDURE — 85610 PROTHROMBIN TIME: CPT | Performed by: EMERGENCY MEDICINE

## 2022-01-29 PROCEDURE — 99285 EMERGENCY DEPT VISIT HI MDM: CPT

## 2022-01-29 PROCEDURE — 82550 ASSAY OF CK (CPK): CPT | Performed by: EMERGENCY MEDICINE

## 2022-01-29 PROCEDURE — 36415 COLL VENOUS BLD VENIPUNCTURE: CPT | Performed by: EMERGENCY MEDICINE

## 2022-01-29 PROCEDURE — 93005 ELECTROCARDIOGRAM TRACING: CPT

## 2022-01-29 PROCEDURE — 74176 CT ABD & PELVIS W/O CONTRAST: CPT

## 2022-01-29 RX ORDER — HYDROMORPHONE HCL/PF 1 MG/ML
0.5 SYRINGE (ML) INJECTION ONCE
Status: COMPLETED | OUTPATIENT
Start: 2022-01-29 | End: 2022-01-29

## 2022-01-29 RX ORDER — FUROSEMIDE 10 MG/ML
20 INJECTION INTRAMUSCULAR; INTRAVENOUS ONCE
Status: COMPLETED | OUTPATIENT
Start: 2022-01-29 | End: 2022-01-30

## 2022-01-29 RX ORDER — ONDANSETRON 2 MG/ML
4 INJECTION INTRAMUSCULAR; INTRAVENOUS ONCE
Status: COMPLETED | OUTPATIENT
Start: 2022-01-29 | End: 2022-01-29

## 2022-01-29 RX ORDER — LABETALOL 20 MG/4 ML (5 MG/ML) INTRAVENOUS SYRINGE
10 ONCE
Status: COMPLETED | OUTPATIENT
Start: 2022-01-29 | End: 2022-01-29

## 2022-01-29 RX ADMIN — HYDROMORPHONE HYDROCHLORIDE 0.5 MG: 1 INJECTION, SOLUTION INTRAMUSCULAR; INTRAVENOUS; SUBCUTANEOUS at 23:10

## 2022-01-29 RX ADMIN — LABETALOL HYDROCHLORIDE 10 MG: 5 INJECTION, SOLUTION INTRAVENOUS at 23:33

## 2022-01-29 RX ADMIN — ONDANSETRON 4 MG: 2 INJECTION INTRAMUSCULAR; INTRAVENOUS at 23:09

## 2022-01-30 PROBLEM — I50.43 ACUTE ON CHRONIC COMBINED SYSTOLIC AND DIASTOLIC CONGESTIVE HEART FAILURE (HCC): Status: ACTIVE | Noted: 2020-03-09

## 2022-01-30 PROBLEM — B19.20 HEPATITIS C: Status: ACTIVE | Noted: 2022-01-30

## 2022-01-30 PROBLEM — N18.9 CKD (CHRONIC KIDNEY DISEASE): Status: ACTIVE | Noted: 2020-03-09

## 2022-01-30 PROBLEM — I16.0 HYPERTENSIVE URGENCY: Status: ACTIVE | Noted: 2022-01-30

## 2022-01-30 LAB
2HR DELTA HS TROPONIN: -2 NG/L
ANION GAP SERPL CALCULATED.3IONS-SCNC: 8 MMOL/L (ref 4–13)
ATRIAL RATE: 113 BPM
ATRIAL RATE: 119 BPM
ATRIAL RATE: 340 BPM
ATRIAL RATE: 59 BPM
ATRIAL RATE: 59 BPM
BACTERIA UR QL AUTO: ABNORMAL /HPF
BASOPHILS # BLD AUTO: 0.03 THOUSANDS/ΜL (ref 0–0.1)
BASOPHILS NFR BLD AUTO: 0 % (ref 0–1)
BILIRUB UR QL STRIP: NEGATIVE
BUN SERPL-MCNC: 19 MG/DL (ref 5–25)
CALCIUM SERPL-MCNC: 8.8 MG/DL (ref 8.3–10.1)
CARDIAC TROPONIN I PNL SERPL HS: 57 NG/L
CARDIAC TROPONIN I PNL SERPL HS: 59 NG/L
CHLORIDE SERPL-SCNC: 105 MMOL/L (ref 100–108)
CLARITY UR: CLEAR
CO2 SERPL-SCNC: 28 MMOL/L (ref 21–32)
COLOR UR: YELLOW
CREAT SERPL-MCNC: 1.77 MG/DL (ref 0.6–1.3)
EOSINOPHIL # BLD AUTO: 0.09 THOUSAND/ΜL (ref 0–0.61)
EOSINOPHIL NFR BLD AUTO: 1 % (ref 0–6)
ERYTHROCYTE [DISTWIDTH] IN BLOOD BY AUTOMATED COUNT: 14.8 % (ref 11.6–15.1)
GFR SERPL CREATININE-BSD FRML MDRD: 31 ML/MIN/1.73SQ M
GLUCOSE SERPL-MCNC: 89 MG/DL (ref 65–140)
GLUCOSE UR STRIP-MCNC: NEGATIVE MG/DL
HCT VFR BLD AUTO: 38.6 % (ref 34.8–46.1)
HGB BLD-MCNC: 12.2 G/DL (ref 11.5–15.4)
HGB UR QL STRIP.AUTO: NEGATIVE
IMM GRANULOCYTES # BLD AUTO: 0.03 THOUSAND/UL (ref 0–0.2)
IMM GRANULOCYTES NFR BLD AUTO: 0 % (ref 0–2)
KETONES UR STRIP-MCNC: NEGATIVE MG/DL
LEUKOCYTE ESTERASE UR QL STRIP: ABNORMAL
LYMPHOCYTES # BLD AUTO: 1.85 THOUSANDS/ΜL (ref 0.6–4.47)
LYMPHOCYTES NFR BLD AUTO: 23 % (ref 14–44)
MAGNESIUM SERPL-MCNC: 1.8 MG/DL (ref 1.6–2.6)
MCH RBC QN AUTO: 27.6 PG (ref 26.8–34.3)
MCHC RBC AUTO-ENTMCNC: 31.6 G/DL (ref 31.4–37.4)
MCV RBC AUTO: 87 FL (ref 82–98)
MONOCYTES # BLD AUTO: 0.47 THOUSAND/ΜL (ref 0.17–1.22)
MONOCYTES NFR BLD AUTO: 6 % (ref 4–12)
NEUTROPHILS # BLD AUTO: 5.73 THOUSANDS/ΜL (ref 1.85–7.62)
NEUTS SEG NFR BLD AUTO: 70 % (ref 43–75)
NITRITE UR QL STRIP: NEGATIVE
NON-SQ EPI CELLS URNS QL MICRO: ABNORMAL /HPF
NRBC BLD AUTO-RTO: 0 /100 WBCS
P AXIS: 44 DEGREES
P AXIS: 64 DEGREES
P AXIS: 90 DEGREES
P AXIS: 91 DEGREES
PH UR STRIP.AUTO: 7 [PH] (ref 4.5–8)
PHOSPHATE SERPL-MCNC: 3.7 MG/DL (ref 2.7–4.5)
PLATELET # BLD AUTO: 162 THOUSANDS/UL (ref 149–390)
PMV BLD AUTO: 11.1 FL (ref 8.9–12.7)
POTASSIUM SERPL-SCNC: 3.8 MMOL/L (ref 3.5–5.3)
PR INTERVAL: 192 MS
PR INTERVAL: 192 MS
PR INTERVAL: 200 MS
PROCALCITONIN SERPL-MCNC: <0.05 NG/ML
PROT UR STRIP-MCNC: ABNORMAL MG/DL
QRS AXIS: -54 DEGREES
QRS AXIS: -57 DEGREES
QRS AXIS: -65 DEGREES
QRS AXIS: -67 DEGREES
QRS AXIS: -71 DEGREES
QRSD INTERVAL: 166 MS
QRSD INTERVAL: 170 MS
QRSD INTERVAL: 170 MS
QRSD INTERVAL: 176 MS
QRSD INTERVAL: 176 MS
QT INTERVAL: 398 MS
QT INTERVAL: 404 MS
QT INTERVAL: 426 MS
QT INTERVAL: 470 MS
QT INTERVAL: 470 MS
QTC INTERVAL: 465 MS
QTC INTERVAL: 465 MS
QTC INTERVAL: 497 MS
QTC INTERVAL: 558 MS
QTC INTERVAL: 563 MS
RBC # BLD AUTO: 4.42 MILLION/UL (ref 3.81–5.12)
RBC #/AREA URNS AUTO: ABNORMAL /HPF
SODIUM SERPL-SCNC: 141 MMOL/L (ref 136–145)
SP GR UR STRIP.AUTO: 1.02 (ref 1–1.03)
T WAVE AXIS: 103 DEGREES
T WAVE AXIS: 116 DEGREES
T WAVE AXIS: 93 DEGREES
T WAVE AXIS: 97 DEGREES
T WAVE AXIS: 99 DEGREES
UROBILINOGEN UR QL STRIP.AUTO: 0.2 E.U./DL
VENTRICULAR RATE: 103 BPM
VENTRICULAR RATE: 117 BPM
VENTRICULAR RATE: 59 BPM
VENTRICULAR RATE: 59 BPM
VENTRICULAR RATE: 94 BPM
WBC # BLD AUTO: 8.2 THOUSAND/UL (ref 4.31–10.16)
WBC #/AREA URNS AUTO: ABNORMAL /HPF

## 2022-01-30 PROCEDURE — 93005 ELECTROCARDIOGRAM TRACING: CPT

## 2022-01-30 PROCEDURE — 87086 URINE CULTURE/COLONY COUNT: CPT

## 2022-01-30 PROCEDURE — 84100 ASSAY OF PHOSPHORUS: CPT | Performed by: PHYSICIAN ASSISTANT

## 2022-01-30 PROCEDURE — 85025 COMPLETE CBC W/AUTO DIFF WBC: CPT | Performed by: PHYSICIAN ASSISTANT

## 2022-01-30 PROCEDURE — 96375 TX/PRO/DX INJ NEW DRUG ADDON: CPT

## 2022-01-30 PROCEDURE — 83735 ASSAY OF MAGNESIUM: CPT | Performed by: PHYSICIAN ASSISTANT

## 2022-01-30 PROCEDURE — 84484 ASSAY OF TROPONIN QUANT: CPT | Performed by: EMERGENCY MEDICINE

## 2022-01-30 PROCEDURE — 96365 THER/PROPH/DIAG IV INF INIT: CPT

## 2022-01-30 PROCEDURE — 84145 PROCALCITONIN (PCT): CPT | Performed by: PHYSICIAN ASSISTANT

## 2022-01-30 PROCEDURE — 36415 COLL VENOUS BLD VENIPUNCTURE: CPT | Performed by: EMERGENCY MEDICINE

## 2022-01-30 PROCEDURE — 80048 BASIC METABOLIC PNL TOTAL CA: CPT | Performed by: PHYSICIAN ASSISTANT

## 2022-01-30 PROCEDURE — 93010 ELECTROCARDIOGRAM REPORT: CPT | Performed by: INTERNAL MEDICINE

## 2022-01-30 PROCEDURE — 96376 TX/PRO/DX INJ SAME DRUG ADON: CPT

## 2022-01-30 PROCEDURE — 81001 URINALYSIS AUTO W/SCOPE: CPT

## 2022-01-30 PROCEDURE — 99223 1ST HOSP IP/OBS HIGH 75: CPT | Performed by: HOSPITALIST

## 2022-01-30 RX ORDER — FUROSEMIDE 20 MG/1
20 TABLET ORAL DAILY
Status: DISCONTINUED | OUTPATIENT
Start: 2022-01-30 | End: 2022-01-30

## 2022-01-30 RX ORDER — NICOTINE 21 MG/24HR
21 PATCH, TRANSDERMAL 24 HOURS TRANSDERMAL DAILY
Status: DISCONTINUED | OUTPATIENT
Start: 2022-01-30 | End: 2022-02-02 | Stop reason: HOSPADM

## 2022-01-30 RX ORDER — HYDRALAZINE HYDROCHLORIDE 20 MG/ML
5 INJECTION INTRAMUSCULAR; INTRAVENOUS ONCE
Status: COMPLETED | OUTPATIENT
Start: 2022-01-30 | End: 2022-01-30

## 2022-01-30 RX ORDER — ACETAMINOPHEN 325 MG/1
650 TABLET ORAL EVERY 4 HOURS PRN
Status: DISCONTINUED | OUTPATIENT
Start: 2022-01-30 | End: 2022-01-30

## 2022-01-30 RX ORDER — HYDROMORPHONE HCL/PF 1 MG/ML
0.2 SYRINGE (ML) INJECTION EVERY 4 HOURS PRN
Status: DISCONTINUED | OUTPATIENT
Start: 2022-01-30 | End: 2022-02-02 | Stop reason: HOSPADM

## 2022-01-30 RX ORDER — HYDROMORPHONE HCL/PF 1 MG/ML
0.5 SYRINGE (ML) INJECTION ONCE
Status: COMPLETED | OUTPATIENT
Start: 2022-01-30 | End: 2022-01-30

## 2022-01-30 RX ORDER — HYDRALAZINE HYDROCHLORIDE 20 MG/ML
10 INJECTION INTRAMUSCULAR; INTRAVENOUS EVERY 4 HOURS PRN
Status: DISCONTINUED | OUTPATIENT
Start: 2022-01-30 | End: 2022-01-31

## 2022-01-30 RX ORDER — METOPROLOL TARTRATE 5 MG/5ML
2.5 INJECTION INTRAVENOUS ONCE
Status: COMPLETED | OUTPATIENT
Start: 2022-01-30 | End: 2022-01-30

## 2022-01-30 RX ORDER — OXYCODONE HYDROCHLORIDE 5 MG/1
5 TABLET ORAL EVERY 6 HOURS PRN
Status: DISCONTINUED | OUTPATIENT
Start: 2022-01-30 | End: 2022-02-02 | Stop reason: HOSPADM

## 2022-01-30 RX ORDER — FUROSEMIDE 10 MG/ML
20 INJECTION INTRAMUSCULAR; INTRAVENOUS
Status: DISCONTINUED | OUTPATIENT
Start: 2022-01-31 | End: 2022-01-31

## 2022-01-30 RX ORDER — ACETAMINOPHEN 325 MG/1
975 TABLET ORAL ONCE
Status: COMPLETED | OUTPATIENT
Start: 2022-01-30 | End: 2022-01-30

## 2022-01-30 RX ORDER — ACETAMINOPHEN 325 MG/1
650 TABLET ORAL EVERY 8 HOURS SCHEDULED
Status: DISCONTINUED | OUTPATIENT
Start: 2022-01-30 | End: 2022-02-02 | Stop reason: HOSPADM

## 2022-01-30 RX ORDER — AMIODARONE HYDROCHLORIDE 200 MG/1
200 TABLET ORAL
Status: DISCONTINUED | OUTPATIENT
Start: 2022-01-30 | End: 2022-02-02 | Stop reason: HOSPADM

## 2022-01-30 RX ADMIN — FUROSEMIDE 20 MG: 10 INJECTION, SOLUTION INTRAMUSCULAR; INTRAVENOUS at 00:02

## 2022-01-30 RX ADMIN — CEFTRIAXONE SODIUM 1000 MG: 10 INJECTION, POWDER, FOR SOLUTION INTRAVENOUS at 01:47

## 2022-01-30 RX ADMIN — METOROPROLOL TARTRATE 2.5 MG: 5 INJECTION, SOLUTION INTRAVENOUS at 06:47

## 2022-01-30 RX ADMIN — ACETAMINOPHEN 650 MG: 325 TABLET, FILM COATED ORAL at 08:38

## 2022-01-30 RX ADMIN — AMIODARONE HYDROCHLORIDE 200 MG: 200 TABLET ORAL at 06:46

## 2022-01-30 RX ADMIN — HYDRALAZINE HYDROCHLORIDE 10 MG: 20 INJECTION, SOLUTION INTRAMUSCULAR; INTRAVENOUS at 11:52

## 2022-01-30 RX ADMIN — NITROGLYCERIN 0.5 INCH: 20 OINTMENT TOPICAL at 00:02

## 2022-01-30 RX ADMIN — METOPROLOL SUCCINATE 150 MG: 100 TABLET, EXTENDED RELEASE ORAL at 08:35

## 2022-01-30 RX ADMIN — METOPROLOL SUCCINATE 150 MG: 100 TABLET, EXTENDED RELEASE ORAL at 20:13

## 2022-01-30 RX ADMIN — SACUBITRIL AND VALSARTAN 1 TABLET: 49; 51 TABLET, FILM COATED ORAL at 17:40

## 2022-01-30 RX ADMIN — SACUBITRIL AND VALSARTAN 1 TABLET: 49; 51 TABLET, FILM COATED ORAL at 09:44

## 2022-01-30 RX ADMIN — FUROSEMIDE 20 MG: 40 TABLET ORAL at 08:35

## 2022-01-30 RX ADMIN — OXYCODONE HYDROCHLORIDE 5 MG: 5 TABLET ORAL at 16:02

## 2022-01-30 RX ADMIN — NITROGLYCERIN 1 INCH: 20 OINTMENT TOPICAL at 01:42

## 2022-01-30 RX ADMIN — NICOTINE 21 MG: 21 PATCH, EXTENDED RELEASE TRANSDERMAL at 03:37

## 2022-01-30 RX ADMIN — HYDROMORPHONE HYDROCHLORIDE 0.5 MG: 1 INJECTION, SOLUTION INTRAMUSCULAR; INTRAVENOUS; SUBCUTANEOUS at 02:24

## 2022-01-30 RX ADMIN — HYDROMORPHONE HYDROCHLORIDE 0.2 MG: 1 INJECTION, SOLUTION INTRAMUSCULAR; INTRAVENOUS; SUBCUTANEOUS at 20:14

## 2022-01-30 RX ADMIN — ACETAMINOPHEN 975 MG: 325 TABLET, FILM COATED ORAL at 02:23

## 2022-01-30 RX ADMIN — ACETAMINOPHEN 650 MG: 325 TABLET, FILM COATED ORAL at 18:49

## 2022-01-30 RX ADMIN — OXYCODONE HYDROCHLORIDE 5 MG: 5 TABLET ORAL at 09:44

## 2022-01-30 RX ADMIN — ACETAMINOPHEN 650 MG: 325 TABLET, FILM COATED ORAL at 11:50

## 2022-01-30 RX ADMIN — RIVAROXABAN 15 MG: 15 TABLET, FILM COATED ORAL at 16:02

## 2022-01-30 RX ADMIN — HYDRALAZINE HYDROCHLORIDE 5 MG: 20 INJECTION, SOLUTION INTRAMUSCULAR; INTRAVENOUS at 01:42

## 2022-01-30 NOTE — ASSESSMENT & PLAN NOTE
Wt Readings from Last 3 Encounters:   01/29/22 97 6 kg (215 lb 2 7 oz)   01/22/22 97 6 kg (215 lb 1 6 oz)   12/09/21 94 3 kg (208 lb)     · Continue PTA Lasix - increase dose and administer IV for now given elevation in BNP, and Entresto   · Last Echo from May of this last year with EF 30% and marked left ventricular hypertrophy  · Daily weights   · Repeat echo   · Can consider +/- cardiology consult

## 2022-01-30 NOTE — SEPSIS NOTE
Sepsis Note   Renrad Ralph 64 y o  female MRN: [id number]  Unit/Bed#: ED 23 Encounter: [id number]       qSOFA     Row Name 01/30/22 0007 01/29/22 2255 01/29/22 2044 01/29/22 2035       Altered mental status GCS < 15 -- -- -- --     Respiratory Rate > / =22 0 0 -- 0     Systolic BP < / =246 0 0 0 --     Q Sofa Score 0 0 0 --                Initial Sepsis Screening     Row Name 01/30/22 0043                Is the patient's history suggestive of a new or worsening infection? Yes (Proceed)  -AO        Suspected source of infection pneumonia  -AO        Are two or more of the following signs & symptoms of infection both present and new to the patient? No  -AO        Indicate SIRS criteria --        If the answer is yes to both questions, suspicion of sepsis is present --        If severe sepsis is present AND tissue hypoperfusion perists in the hour after fluid resuscitation or lactate > 4, the patient meets criteria for SEPTIC SHOCK --        Are any of the following organ dysfunction criteria present within 6 hours of suspected infection and SIRS criteria that are NOT considered to be chronic conditions?  --        Organ dysfunction --        Date of presentation of severe sepsis --        Time of presentation of severe sepsis --        Tissue hypoperfusion persists in the hour after crystalloid fluid administration, evidenced, by either: --        Was hypotension present within one hour of the conclusion of crystalloid fluid administration? --        Date of presentation of septic shock --        Time of presentation of septic shock --              User Key  (r) = Recorded By, (t) = Taken By, (c) = Cosigned By    [address] Name Provider Meka Morgan MD Physician
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
## 2022-01-30 NOTE — ED NOTES
Pt requesting more then just tylenol for pain  She states "I can't get anymore of that dilantin or whatever it's called?" TigerText sent to Dr Brigette Torres RN  01/30/22 2829

## 2022-01-30 NOTE — UTILIZATION REVIEW
Initial Clinical Review    Admission: Date/Time/Statement:   Admission Orders (From admission, onward)     Ordered        01/30/22 0242  Inpatient Admission  Once                      Orders Placed This Encounter   Procedures    Inpatient Admission     Telemetry     Standing Status:   Standing     Number of Occurrences:   1     Order Specific Question:   Level of Care     Answer:   Level 1 Stepdown [13]     Order Specific Question:   Bed request comments     Answer:   telemetry     Order Specific Question:   Estimated length of stay     Answer:   More than 2 Midnights     Order Specific Question:   Certification     Answer:   I certify that inpatient services are medically necessary for this patient for a duration of greater than two midnights  See H&P and MD Progress Notes for additional information about the patient's course of treatment  ED Arrival Information     Expected Arrival Acuity    - 1/29/2022 20:19 Urgent         Means of arrival Escorted by Service Admission type    Walk-In Spouse Hospitalist Urgent         Arrival complaint    COUGH/PAIN ON COUGHING        Chief Complaint   Patient presents with    Back Pain     Pt complains of right sided back and rib pain  Pt states that pain is worse with coughing  Pt had covid 6 weeks ago  Pt states that she is on xeralto for A fib  Initial Presentation: 65 yo female presents to ED from home due to right chest wall pain  Found to be profoundly hypertensive in /120  Treated with IV labetalol, hydralazine, lasix and NTG paste  PMH significant for afib on Xarelto, VTach with ICD, hepatitis C, CKD, HTN, HLD  Pt reports she did not take antihypertensives yesterday   Exam notes tenderness to palpation along right mid axillary line, intercostal space  normal  breath sounds, awake and alert, normal heart sounds, abdomen soft, BUN 18, creatinine 1 69  BNP elevated > 10,000  Lipase 410  EKG SB  CT CAP no clear abnormality to explain pain   Suspect pain is musculoskeletal   Admitted as inpatient to Step down level 1  for hypertensive urgency , acute on chronic CHF , CKD, hepatitis C , Afib hx on xarelto  Plan  Continue home antihypertensives,  daily weights, repeat ECHO, IV lasix for elevated BNP,  monitor LFT's, electrolytes, kidney function  Cardene gtt was ordered , however not required due to improvement in BP to 143/94  Continue to monitor BP's telemetry continue xarelto, amiodarone, metoprolol     Date:  1/30  Afternoon update    Lungs clear to auscultation, suspect right chest wall pain is musculoskeletal in origin, continue prn analgesia  Continue telemetry monitor patients rates have been erratic and monitor for at least 24 hrs until HR trend is clear EKG A fib  May need additional IV meds to safely bring down BP      1/31  INPATIENT DAY 2    Patient with persistent tachycardia over telemetry in the 130s  Rhythm is SVT vs atrial flutter    Given Lopressor 5 mg x1 with no improvement  Given Amiodarone bolus 150 mg x1 and rate has eventually improved to the 80s  Cardiology consulted 1/31  Am EKG A flutter with variable b AV block , 's, s/p lopressor, amio bolus without conversion,  start amio gtt at 1mg/hour - if patient does not convert, will cardiovert tomorrow  PMH afib s/p ablation 5/2020, continue xarelto, toprol, Hx MDT DC ICD 2016 will have device interrogated today  CM , LVEF 30%, echo today is pending  euvolemic, DC lasix secondary to DANIELA, creatinine today 2 11, continue Entresto, monitor UO         Date/Time Weight Weight Method   01/30/22 1301 97 6 kg (215 lb 2 7 oz) Bed scale   01/29/22 2035 97 6 kg (215 lb 2 7 oz) Stated         ED Triage Vitals   Temperature Pulse Respirations Blood Pressure SpO2   01/29/22 2035 01/29/22 2035 01/29/22 2035 01/29/22 2044 01/29/22 2035   97 5 °F (36 4 °C) 97 20 (!) 240/120 98 %      Temp Source Heart Rate Source Patient Position - Orthostatic VS BP Location FiO2 (%)   01/29/22 2035 01/29/22 2035 01/29/22 2035 01/29/22 2035 --   Oral Monitor Sitting Left arm       Pain Score       01/29/22 2035       8          Wt Readings from Last 1 Encounters:   01/31/22 97 5 kg (215 lb)     Additional Vital Signs:      Date/Time Temp Pulse Resp BP MAP (mmHg) SpO2 O2 Device Patient Position - Orthostatic VS   01/31/22 1521 97 8 °F (36 6 °C) 123 Abnormal  20 156/86 -- 98 % None (Room air) Sitting   01/31/22 0945 -- 120 Abnormal  -- 112/89 -- -- -- --   01/31/22 0734 97 6 °F (36 4 °C) 113 Abnormal  20 112/89 98 94 % None (Room air) Lying   01/31/22 0623 -- 88 -- -- -- -- -- --   01/31/22 0413 -- 129 Abnormal  -- 125/82 -- -- -- --   01/30/22 2147 98 7 °F (37 1 °C) 130 Abnormal  18 118/77 94 96 % None (Room air) Lying   01/30/22 2012 -- 123 Abnormal  -- 136/97 -- -- -- --   01/30/22 1556 97 6 °F (36 4 °C) 128 Abnormal  18 138/64 -- 97 % None (Room air) Lying         Date/Time Temp Pulse Resp BP MAP (mmHg) SpO2 O2 Device Patient Position - Orthostatic VS   01/30/22 1301 98 1 °F (36 7 °C) 97 18 141/96 120 96 % None (Room air) Lying   01/30/22 1150 -- 121 Abnormal  18 184/124 Abnormal  -- 99 % None (Room air) Lying   01/30/22 0835 -- 108 Abnormal  -- -- -- -- -- --   01/30/22 0800 -- 112 Abnormal  18 179/101 Abnormal  136 96 % None (Room air) --   01/30/22 0700 -- 100 20 175/105 Abnormal  129 95 % None (Room air) --   01/30/22 0515 -- 104 20 134/98 113 95 % None (Room air) Lying   01/30/22 0326 -- 70 20 143/94 115 93 % None (Room air) --   01/30/22 0300 -- 64 20 163/114 Abnormal  128 92 % None (Room air) Lying   01/30/22 0223 -- 59 20 200/91 Abnormal  -- 96 % None (Room air) Lying   01/30/22 0142 -- -- -- 239/119 Abnormal  -- -- -- --   01/30/22 0116 -- 59 22 239/119 Abnormal  -- 98 % None (Room air) Sitting   01/30/22 0007 -- 60 20 234/109 Abnormal  -- 98 % None (Room air) --       Pertinent Labs/Diagnostic Test Results:     1/31 ECHO     Left Ventricle: The left ventricular ejection fraction is 55%   Systolic function is normal  Wall motion is normal  Diastolic function is moderately abnormal, consistent with grade II (pseudonormal) relaxation  Wall thickness is severely increased  There is moderate concentric hypertrophy and more severe asymmetric hypertrophy    Right Ventricle: Systolic function is mildly reduced    Left Atrium: The atrium is mildly dilated    Mitral Valve: There is trace regurgitation          1/29 EKG    Sinus bradycardia  Possible Left atrial enlargement  Right bundle branch block  Left anterior fascicular block  Bifascicular block  Left ventricular hypertrophy with QRS widening and repolarization abnormality  1/30 EKG    Atrial-paced rhythm  Right bundle branch block  Left anterior fascicular block  Bifascicular block   Left ventricular hypertrophy with QRS widening and repolarization abnormality  1/30 EKG   Atrial fibrillation  Right bundle branch block  Left anterior fascicular block  Bifascicular block   Left ventricular hypertrophy with QRS widening  Abnormal ECG  When compared with ECG of 30-JAN-2022 01:15, (unconfirmed)  Vent  rate has increased BY  35 BPM    1/31 EKG   Atrial flutter with variable A-V block  Right bundle branch block  Left anterior fascicular block  Bifascicular block  Left ventricular hypertrophy with repolarization abnormality    1/30  CT CAP   Impression:       No evidence of acute pathology throughout the chest, abdomen or pelvis  PCXR 1/30    No acute cardiopulmonary disease         Results from last 7 days   Lab Units 01/30/22  0447 01/29/22  2312   WBC Thousand/uL 8 20 8 56   HEMOGLOBIN g/dL 12 2 12 8   HEMATOCRIT % 38 6 40 0   PLATELETS Thousands/uL 162 165   NEUTROS ABS Thousands/µL 5 73 6 09         Results from last 7 days   Lab Units 01/31/22  0421 01/30/22  0447 01/29/22  2312   SODIUM mmol/L 137 141 140   POTASSIUM mmol/L 4 0 3 8 3 9   CHLORIDE mmol/L 102 105 104   CO2 mmol/L 27 28 30   ANION GAP mmol/L 8 8 6   BUN mg/dL 25 19 18   CREATININE mg/dL 2 11* 1 77* 1 69*   EGFR ml/min/1 73sq m 25 31 33   CALCIUM mg/dL 8 9 8 8 9 1   MAGNESIUM mg/dL 2 0 1 8  --    PHOSPHORUS mg/dL  --  3 7  --      Results from last 7 days   Lab Units 01/29/22 2312   AST U/L 18   ALT U/L 22   ALK PHOS U/L 69   TOTAL PROTEIN g/dL 8 1   ALBUMIN g/dL 4 0   TOTAL BILIRUBIN mg/dL 0 42         Results from last 7 days   Lab Units 01/31/22  0421 01/30/22  0447 01/29/22 2312   GLUCOSE RANDOM mg/dL 130 89 90             No results found for: BETA-HYDROXYBUTYRATE               Results from last 7 days   Lab Units 01/29/22 2312   CK TOTAL U/L 72     Results from last 7 days   Lab Units 01/30/22  0115 01/29/22 2312   HS TNI 0HR ng/L  --  59*   HS TNI 2HR ng/L 57*  --    HSTNI D2 ng/L -2  --          Results from last 7 days   Lab Units 01/29/22 2312   PROTIME seconds 14 9*   INR  1 17   PTT seconds 32         Results from last 7 days   Lab Units 01/31/22 0421 01/30/22 0447   PROCALCITONIN ng/ml 0 05 <0 05                 Results from last 7 days   Lab Units 01/29/22 2312   NT-PRO BNP pg/mL 10,536*             Results from last 7 days   Lab Units 01/29/22 2312   LIPASE u/L 410*                 Results from last 7 days   Lab Units 01/30/22  0010   CLARITY UA  Clear   COLOR UA  Yellow   SPEC GRAV UA  1 020   PH UA  7 0   GLUCOSE UA mg/dl Negative   KETONES UA mg/dl Negative   BLOOD UA  Negative   PROTEIN UA mg/dl 30 (1+)*   NITRITE UA  Negative   BILIRUBIN UA  Negative   UROBILINOGEN UA E U /dl 0 2   LEUKOCYTES UA  Small*   WBC UA /hpf 10-20*   RBC UA /hpf 2-4   BACTERIA UA /hpf Occasional   EPITHELIAL CELLS WET PREP /hpf Occasional                                 Results from last 7 days   Lab Units 01/30/22  0010 01/29/22 2341 01/29/22 2312   BLOOD CULTURE   --  No Growth at 24 hrs  No Growth at 24 hrs     URINE CULTURE  20,000-29,000 cfu/ml   --   --                ED Treatment:   Medication Administration from 01/29/2022 2018 to 01/30/2022 1245       Date/Time Order Dose Route Action Comments 01/29/2022 2309 ondansetron (ZOFRAN) injection 4 mg 4 mg Intravenous Given      01/29/2022 2310 HYDROmorphone (DILAUDID) injection 0 5 mg 0 5 mg Intravenous Given      01/29/2022 2333 Labetalol HCl (NORMODYNE) injection 10 mg 10 mg Intravenous Given HR 64     01/30/2022 0002 nitroglycerin (NITRO-BID) 2 % TD ointment 0 5 inch 0 5 inch Topical Given      01/30/2022 0002 furosemide (LASIX) injection 20 mg 20 mg Intravenous Given      01/30/2022 0147 ceftriaxone (ROCEPHIN) 1 g/50 mL in dextrose IVPB 1,000 mg Intravenous New Bag      01/30/2022 0142 nitroglycerin (NITRO-BID) 2 % TD ointment 1 inch 1 inch Topical Given      01/30/2022 0142 hydrALAZINE (APRESOLINE) injection 5 mg 5 mg Intravenous Given      01/30/2022 0224 HYDROmorphone (DILAUDID) injection 0 5 mg 0 5 mg Intravenous Given      01/30/2022 0223 acetaminophen (TYLENOL) tablet 975 mg 975 mg Oral Given      01/30/2022 0315 niCARdipine (CARDENE) 25 mg (STANDARD CONCENTRATION) in sodium chloride 0 9% 250 mL 0 mg/hr Intravenous Hold /94     01/30/2022 0646 amiodarone tablet 200 mg 200 mg Oral Given      01/30/2022 0835 furosemide (LASIX) tablet 20 mg 20 mg Oral Given      01/30/2022 0835 metoprolol succinate (TOPROL-XL) 24 hr tablet 150 mg 150 mg Oral Given      01/30/2022 0944 sacubitril-valsartan (ENTRESTO) 49-51 MG per tablet 1 tablet 1 tablet Oral Given      01/30/2022 1150 acetaminophen (TYLENOL) tablet 650 mg 650 mg Oral Given      01/30/2022 0838 acetaminophen (TYLENOL) tablet 650 mg 650 mg Oral Given      01/30/2022 0337 nicotine (NICODERM CQ) 21 mg/24 hr TD 24 hr patch 21 mg 21 mg Transdermal Medication Applied      01/30/2022 1152 hydrALAZINE (APRESOLINE) injection 10 mg 10 mg Intravenous Given      01/30/2022 0647 metoprolol (LOPRESSOR) injection 2 5 mg 2 5 mg Intravenous Given      01/30/2022 0944 oxyCODONE (ROXICODONE) IR tablet 5 mg 5 mg Oral Given         Past Medical History:   Diagnosis Date    Arrhythmia     Arthritis     Atrial fibrillation ([facility] )     Atrial fibrillation with rapid ventricular response ([facility] ) 3/20/2016    Breast lump     CKD (chronic kidney disease) stage 3, GFR 30-59 ml/min (HCC)     Disease of thyroid gland     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 ([facility] ) 5/25/2020    GERD (gastroesophageal reflux disease)     H/O transfusion 1987    Hepatitis C     resolved    Hepatitis C     Hyperlipidemia     Hypertension     Irregular heart beat     Pacemaker     Sleep apnea     no cpap    Tachycardia      Present on Admission:   Nicotine dependence   Paroxysmal A-fib (HCC)   Acute on chronic combined systolic and diastolic congestive heart failure (HCC)   Hypertensive urgency   Hepatitis C   CKD (chronic kidney disease) stage 3, GFR 30-59 ml/min (HCC)   (Resolved) Atrial fibrillation with rapid ventricular response ([facility])      Admitting Diagnosis: Back pain [M54 9]  CHF (congestive heart failure) (HCC) [I50 9]  UTI (urinary tract infection) [N39 0]  Chest pain [R07 9]  Elevated troponin [R77 8]  Uncontrolled hypertension [I10]  Age/Sex: 64 y o  female  Admission Orders:  Scheduled Medications:  acetaminophen, 650 mg, Oral, Q8H [facility]  amiodarone, 200 mg, Oral, Daily With Breakfast  metoprolol succinate, 150 mg, Oral, BID  nicotine, 21 mg, Transdermal, Daily  rivaroxaban, 15 mg, Oral, Daily With Dinner  sacubitril-valsartan, 1 tablet, Oral, BID      Continuous IV Infusions:       amiodarone (CORDARONE) 900 mg in dextrose 5 % 500 mL infusion   start 1/31 1311     multi-electrolyte (PLASMALYTE-A/ISOLYTE-S PH 7 4) IV solution   Start 1/31 1312       PRN Meds:  HYDROmorphone, 0 2 mg, Intravenous, Q4H PRN   IV x 3   oxyCODONE, 5 mg, Oral, Q6H PRN   PO x 3     Daily weight   I/O   Neuro check q 4h   Dysphagia assessment   Procalcitonin 1/31   ECHO   IP CONSULT TO CASE MANAGEMENT  IP CONSULT TO CARDIOLOGY    Network Utilization Review Department  ATTENTION: Please call with any questions or concerns to [phone] and carefully listen to the prompts so that you are directed to the right person  All voicemails are confidential   Norma Khannapper all requests for admission clinical reviews, approved or denied determinations and any other requests to dedicated fax number below belonging to the campus where the patient is receiving treatment   List of dedicated fax numbers for the Facilities:  [address] DENIALS (Administrative/Medical Necessity) [phone]   [address] (Maternity/NICU/Pediatrics) [phone]   [address] [place] [address]  [address] [facility] [place] [id number] [address] [place] [address] [phone]

## 2022-01-30 NOTE — ASSESSMENT & PLAN NOTE
Lab Results   Component Value Date    EGFR 33 01/29/2022    EGFR 35 11/30/2021    EGFR 31 09/26/2021    CREATININE 1 69 (H) 01/29/2022    CREATININE 1 64 (H) 11/30/2021    CREATININE 1 78 (H) 09/26/2021     · Kidney function currently stable  · Trend labs and manage electrolytes PRN
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
## 2022-01-30 NOTE — PLAN OF CARE
Problem: Potential for Falls  Goal: Patient will remain free of falls  Description: INTERVENTIONS:  - Educate patient/family on patient safety including physical limitations  - Instruct patient to call for assistance with activity   - Consult OT/PT to assist with strengthening/mobility   - Keep Call bell within reach  - Keep bed low and locked with side rails adjusted as appropriate  - Keep care items and personal belongings within reach  - Initiate and maintain comfort rounds  - Make Fall Risk Sign visible to staff  - Apply yellow socks and bracelet for high fall risk patients  - Consider moving patient to room near nurses station  Outcome: Progressing     Problem: PAIN - ADULT  Goal: Verbalizes/displays adequate comfort level or baseline comfort level  Description: Interventions:  - Encourage patient to monitor pain and request assistance  - Assess pain using appropriate pain scale  - Administer analgesics based on type and severity of pain and evaluate response  - Implement non-pharmacological measures as appropriate and evaluate response  - Consider cultural and social influences on pain and pain management  - Notify physician/advanced practitioner if interventions unsuccessful or patient reports new pain  Outcome: Progressing     Problem: INFECTION - ADULT  Goal: Absence or prevention of progression during hospitalization  Description: INTERVENTIONS:  - Assess and monitor for signs and symptoms of infection  - Monitor lab/diagnostic results  - Monitor all insertion sites, i e  indwelling lines, tubes, and drains  - Monitor endotracheal if appropriate and nasal secretions for changes in amount and color  - [place] appropriate cooling/warming therapies per order  - Administer medications as ordered  - Instruct and encourage patient and family to use good hand hygiene technique  - Identify and instruct in appropriate isolation precautions for identified infection/condition  Outcome: Progressing     Problem: Knowledge Deficit  Goal: Patient/family/caregiver demonstrates understanding of disease process, treatment plan, medications, and discharge instructions  Description: Complete learning assessment and assess knowledge base    Interventions:  - Provide teaching at level of understanding  - Provide teaching via preferred learning methods  Outcome: Progressing

## 2022-01-30 NOTE — ASSESSMENT & PLAN NOTE
· Pt given labetalol, nitro paste x2, lasix and hydralazine without improvement in blood pressures in ED   · Started on Cardene infusion, continue, wean to off   · Continue PTA antihypertensives

## 2022-01-31 ENCOUNTER — APPOINTMENT (INPATIENT)
Dept: NON INVASIVE DIAGNOSTICS | Facility: HOSPITAL | Age: 57
DRG: 194 | End: 2022-01-31
Payer: COMMERCIAL

## 2022-01-31 PROBLEM — N18.30 CKD (CHRONIC KIDNEY DISEASE) STAGE 3, GFR 30-59 ML/MIN (HCC): Status: ACTIVE | Noted: 2022-01-31

## 2022-01-31 PROBLEM — I48.92 ATRIAL FLUTTER (HCC): Status: ACTIVE | Noted: 2022-01-31

## 2022-01-31 LAB
ANION GAP SERPL CALCULATED.3IONS-SCNC: 8 MMOL/L (ref 4–13)
AORTIC ROOT: 3.1 CM
APICAL FOUR CHAMBER EJECTION FRACTION: 46 %
ASCENDING AORTA: 2.6 CM (ref 2.12–3.17)
ATRIAL RATE: 114 BPM
AV LVOT PEAK GRADIENT: 3 MMHG
AV PEAK GRADIENT: 5 MMHG
BACTERIA UR CULT: NORMAL
BUN SERPL-MCNC: 25 MG/DL (ref 5–25)
CALCIUM SERPL-MCNC: 8.9 MG/DL (ref 8.3–10.1)
CHLORIDE SERPL-SCNC: 102 MMOL/L (ref 100–108)
CO2 SERPL-SCNC: 27 MMOL/L (ref 21–32)
CREAT SERPL-MCNC: 2.11 MG/DL (ref 0.6–1.3)
DOP CALC RVOT PEAK VEL: 0.56 M/S
E WAVE DECELERATION TIME: 94 MS
FRACTIONAL SHORTENING: 25 % (ref 28–44)
GFR SERPL CREATININE-BSD FRML MDRD: 25 ML/MIN/1.73SQ M
GLUCOSE SERPL-MCNC: 130 MG/DL (ref 65–140)
INTERVENTRICULAR SEPTUM IN DIASTOLE (PARASTERNAL SHORT AXIS VIEW): 1.2 CM (ref 0.55–1.03)
LEFT ATRIUM AREA SYSTOLE SINGLE PLANE A4C: 19.2 CM2
LEFT ATRIUM SIZE: 4.1 CM
LEFT INTERNAL DIMENSION IN SYSTOLE: 3.6 CM (ref 2.1–4)
LEFT VENTRICULAR INTERNAL DIMENSION IN DIASTOLE: 4.8 CM (ref 5.97–8.9)
LEFT VENTRICULAR POSTERIOR WALL IN END DIASTOLE: 1.2 CM (ref 0.54–1.02)
LEFT VENTRICULAR STROKE VOLUME: 55 ML
MAGNESIUM SERPL-MCNC: 2 MG/DL (ref 1.6–2.6)
MV E'TISSUE VEL-SEP: 6 CM/S
MV PEAK A VEL: 0.33 M/S
MV PEAK E VEL: 76 CM/S
MV STENOSIS PRESSURE HALF TIME: 0 MS
POTASSIUM SERPL-SCNC: 4 MMOL/L (ref 3.5–5.3)
PROCALCITONIN SERPL-MCNC: 0.05 NG/ML
PV PEAK GRADIENT: 3 MMHG
QRS AXIS: -57 DEGREES
QRSD INTERVAL: 160 MS
QT INTERVAL: 430 MS
QTC INTERVAL: 592 MS
RIGHT ATRIUM AREA SYSTOLE A4C: 11.2 CM2
RIGHT VENTRICLE ID DIMENSION: 4.1 CM
SL CV LV EF: 55
SL CV PED ECHO LEFT VENTRICLE DIASTOLIC VOLUME (MOD BIPLANE) 2D: 109 ML
SL CV PED ECHO LEFT VENTRICLE SYSTOLIC VOLUME (MOD BIPLANE) 2D: 54 ML
SL CV RVOT MAX GRADIENT: 1 MMHG
SODIUM SERPL-SCNC: 137 MMOL/L (ref 136–145)
T WAVE AXIS: 106 DEGREES
TR MAX PG: 12 MMHG
TRICUSPID VALVE PEAK REGURGITATION VELOCITY: 1.75 M/S
VENTRICULAR RATE: 114 BPM
Z-SCORE OF ASCENDING AORTA: -0.16
Z-SCORE OF INTERVENTRICULAR SEPTUM IN END DIASTOLE: 3.32
Z-SCORE OF LEFT VENTRICULAR DIMENSION IN END SYSTOLE: -4.18
Z-SCORE OF LEFT VENTRICULAR POSTERIOR WALL IN END DIASTOLE: 3.43

## 2022-01-31 PROCEDURE — 84145 PROCALCITONIN (PCT): CPT | Performed by: PHYSICIAN ASSISTANT

## 2022-01-31 PROCEDURE — 80048 BASIC METABOLIC PNL TOTAL CA: CPT | Performed by: INTERNAL MEDICINE

## 2022-01-31 PROCEDURE — 83735 ASSAY OF MAGNESIUM: CPT | Performed by: INTERNAL MEDICINE

## 2022-01-31 PROCEDURE — 93306 TTE W/DOPPLER COMPLETE: CPT

## 2022-01-31 PROCEDURE — 99255 IP/OBS CONSLTJ NEW/EST HI 80: CPT | Performed by: INTERNAL MEDICINE

## 2022-01-31 PROCEDURE — 99232 SBSQ HOSP IP/OBS MODERATE 35: CPT | Performed by: HOSPITALIST

## 2022-01-31 PROCEDURE — 93306 TTE W/DOPPLER COMPLETE: CPT | Performed by: INTERNAL MEDICINE

## 2022-01-31 PROCEDURE — 93010 ELECTROCARDIOGRAM REPORT: CPT | Performed by: INTERNAL MEDICINE

## 2022-01-31 PROCEDURE — 93005 ELECTROCARDIOGRAM TRACING: CPT

## 2022-01-31 RX ORDER — METOPROLOL TARTRATE 5 MG/5ML
5 INJECTION INTRAVENOUS ONCE
Status: COMPLETED | OUTPATIENT
Start: 2022-01-31 | End: 2022-01-31

## 2022-01-31 RX ORDER — SODIUM CHLORIDE, SODIUM GLUCONATE, SODIUM ACETATE, POTASSIUM CHLORIDE, MAGNESIUM CHLORIDE, SODIUM PHOSPHATE, DIBASIC, AND POTASSIUM PHOSPHATE .53; .5; .37; .037; .03; .012; .00082 G/100ML; G/100ML; G/100ML; G/100ML; G/100ML; G/100ML; G/100ML
50 INJECTION, SOLUTION INTRAVENOUS CONTINUOUS
Status: DISCONTINUED | OUTPATIENT
Start: 2022-01-31 | End: 2022-02-02 | Stop reason: HOSPADM

## 2022-01-31 RX ADMIN — FUROSEMIDE 20 MG: 10 INJECTION, SOLUTION INTRAVENOUS at 08:27

## 2022-01-31 RX ADMIN — HYDROMORPHONE HYDROCHLORIDE 0.2 MG: 1 INJECTION, SOLUTION INTRAMUSCULAR; INTRAVENOUS; SUBCUTANEOUS at 03:07

## 2022-01-31 RX ADMIN — METOPROLOL SUCCINATE 150 MG: 100 TABLET, EXTENDED RELEASE ORAL at 08:27

## 2022-01-31 RX ADMIN — ACETAMINOPHEN 650 MG: 325 TABLET, FILM COATED ORAL at 05:25

## 2022-01-31 RX ADMIN — AMIODARONE HYDROCHLORIDE 1 MG/MIN: 50 INJECTION, SOLUTION INTRAVENOUS at 13:11

## 2022-01-31 RX ADMIN — METOPROLOL TARTRATE 5 MG: 5 INJECTION, SOLUTION INTRAVENOUS at 04:14

## 2022-01-31 RX ADMIN — HYDROMORPHONE HYDROCHLORIDE 0.2 MG: 1 INJECTION, SOLUTION INTRAMUSCULAR; INTRAVENOUS; SUBCUTANEOUS at 15:56

## 2022-01-31 RX ADMIN — ACETAMINOPHEN 650 MG: 325 TABLET, FILM COATED ORAL at 14:32

## 2022-01-31 RX ADMIN — ACETAMINOPHEN 650 MG: 325 TABLET, FILM COATED ORAL at 00:37

## 2022-01-31 RX ADMIN — HYDROMORPHONE HYDROCHLORIDE 0.2 MG: 1 INJECTION, SOLUTION INTRAMUSCULAR; INTRAVENOUS; SUBCUTANEOUS at 08:28

## 2022-01-31 RX ADMIN — PERFLUTREN 0.5 ML/MIN: 6.52 INJECTION, SUSPENSION INTRAVENOUS at 11:15

## 2022-01-31 RX ADMIN — NICOTINE 21 MG: 21 PATCH, EXTENDED RELEASE TRANSDERMAL at 08:28

## 2022-01-31 RX ADMIN — ACETAMINOPHEN 650 MG: 325 TABLET, FILM COATED ORAL at 21:46

## 2022-01-31 RX ADMIN — AMIODARONE HYDROCHLORIDE 200 MG: 200 TABLET ORAL at 08:27

## 2022-01-31 RX ADMIN — OXYCODONE HYDROCHLORIDE 5 MG: 5 TABLET ORAL at 13:04

## 2022-01-31 RX ADMIN — SACUBITRIL AND VALSARTAN 1 TABLET: 49; 51 TABLET, FILM COATED ORAL at 17:59

## 2022-01-31 RX ADMIN — METOPROLOL SUCCINATE 150 MG: 100 TABLET, EXTENDED RELEASE ORAL at 21:47

## 2022-01-31 RX ADMIN — SODIUM CHLORIDE, SODIUM GLUCONATE, SODIUM ACETATE, POTASSIUM CHLORIDE, MAGNESIUM CHLORIDE, SODIUM PHOSPHATE, DIBASIC, AND POTASSIUM PHOSPHATE 50 ML/HR: .53; .5; .37; .037; .03; .012; .00082 INJECTION, SOLUTION INTRAVENOUS at 13:12

## 2022-01-31 RX ADMIN — DEXTROSE 150 MG: 50 INJECTION, SOLUTION INTRAVENOUS at 05:24

## 2022-01-31 RX ADMIN — SACUBITRIL AND VALSARTAN 1 TABLET: 49; 51 TABLET, FILM COATED ORAL at 08:27

## 2022-01-31 RX ADMIN — OXYCODONE HYDROCHLORIDE 5 MG: 5 TABLET ORAL at 19:27

## 2022-01-31 RX ADMIN — RIVAROXABAN 15 MG: 15 TABLET, FILM COATED ORAL at 15:56

## 2022-01-31 RX ADMIN — HYDROMORPHONE HYDROCHLORIDE 0.2 MG: 1 INJECTION, SOLUTION INTRAMUSCULAR; INTRAVENOUS; SUBCUTANEOUS at 23:34

## 2022-01-31 NOTE — CASE MANAGEMENT
Case Management Assessment & Discharge Planning Note    Patient name Renard SHERIDAN /S -57 MRN [id number]  : 1965 Date 2022       Current Admission Date: 2022  Current Admission Diagnosis:Hypertensive urgency   Patient Active Problem List    Diagnosis Date Noted    CKD (chronic kidney disease) stage 3, GFR 30-59 ml/min ([facility] ) 2022    Atrial flutter ([facility] ) 2022    Hypertensive urgency 2022    Hepatitis C 2022    Acute right flank pain 08/15/2021    Epigastric abdominal tenderness 08/15/2021    Thrombocytopenia ([facility] ) 2021    ACS (acute coronary syndrome) ([facility] ) 2021    Leg swelling 2020    Paroxysmal A-fib ([facility] ) 2020    [facility] 2020    Acute on chronic combined systolic and diastolic congestive heart failure ([facility] ) 2020    Elevated lipase 10/10/2019    Palpitations 2019    Elevated troponin 2019    Hypertrophic cardiomyopathy ([facility] ) 2019    Nicotine dependence 2018    NSTEMI (non-ST elevated myocardial infarction) ([facility] ) 2018    ICD (implantable cardioverter-defibrillator) discharge 05/10/2017    History of ventricular tachycardia ([facility] ) with ICD 2016    Chest pain 2016    Hyperlipidemia 2016    Uncontrolled hypertension 2016    Gastroesophageal reflux disease without esophagitis 2016      LOS (days): 1  Geometric Mean LOS (GMLOS) (days):   Days to GMLOS:     OBJECTIVE:    Risk of Unplanned Readmission Score: 26         Current admission status: Inpatient       Preferred Pharmacy:   [facility]  [address] [facility],Fourth [address]  Phone: [phone] Fax: [phone]    Primary Care Provider: Nicholas Flaherty MD    [facility]  Secondary Insurance:     ASSESSMENT:  Active Health Care Agents    There are no active Health Care Agents on file  Patient Information  Admitted from[de-identified] Home  Mental Status: Alert  During Assessment patient was accompanied by: Not accompanied during assessment  Assessment information provided by[de-identified] Patient  Primary Caregiver: Self  Support Systems: Spouse/significant other  Home entry access options   Select all that apply : Stairs  Number of steps to enter home : 3  Type of Current Residence: 3 story home  Upon entering residence, is there a bedroom on the main floor (no further steps)?: Yes  Upon entering residence, is there a bathroom on the main floor (no further steps)?: Yes  Living Arrangements: Lives w/ Spouse/significant other    Activities of Daily Living Prior to Admission  Functional Status: Independent  Completes ADLs independently?: Yes  Ambulates independently?: Yes  Does patient use assisted devices?: No  Does patient currently own DME?: Yes  What DME does the patient currently own?: Straight Cane,Wheelchair  Does patient have a history of Outpatient Therapy (PT/OT)?: No  Does the patient have a history of Short-Term Rehab?: No  Does patient have a history of HHC?: Yes  [place] [id number] No                   Means of Transportation  Means of Transport to Appts[de-identified] Drives Self  In the past 12 months, has lack of transportation kept you from medical appointments or from getting medications?: No  In the past 12 months, has lack of transportation kept you from meetings, work, or from getting things needed for daily living?: No  Was application for public transport provided?: No        DISCHARGE DETAILS:    Discharge planning discussed with[de-identified] patient  Freedom of Choice: Yes  Comments - Freedom of Choice: hx off hhc through [facility] contacted family/caregiver?: No- see comments             Contacts  Patient Contacts: Liyah Allen (Spouse)  Relationship to Patient[de-identified] Family  Contact Method: Phone  Phone Number: [phone] (M)  Reason/Outcome: Emergency Contact Other Referral/Resources/Interventions Provided:  Referral Comments: Patient admitted due to Hypertensive urgency  IV dilaudid  Cards following  CM met with patient at bedside to discuss assessment and d/c planning  Patient lives with spouse in three story home, 3 [address]  Pt is independent with ADLs, owns cane and wheelchair but does not use, and drives self to appointments  Pt relay hx of hhc through [facility] and no hx of rehab  Confirmed PCP Komal Boles) and preferred pharmacy [facility])  Pt relayed spouse or friend would provide transport home upon d/c  CM dept  to follow as needed pending d/c recommendations, no current CM needs at this time      Would you like to participate in our [address] service program?  : No - Declined

## 2022-01-31 NOTE — UTILIZATION REVIEW
Inpatient Admission Authorization Request   NOTIFICATION OF INPATIENT ADMISSION/INPATIENT AUTHORIZATION REQUEST   SERVICING FACILITY:   [address]  Tax ID: [id number]  NPI: [id number]  Place of Service[facility]patient [facility]  [address]  Place of Service Code: 24     ATTENDING PROVIDER:  Attending Name and NPI#: Margarita Drake Md [[id number]]  Address: [address]  Phone: [phone]     UTILIZATION REVIEW CONTACT:  [facility] Utilization   Network Utilization Review Department  Phone: [phone]  Fax: [phone]  Email: [email or web address]     PHYSICIAN ADVISORY SERVICES:  FOR GLUP-PG-UFXR REVIEW - MEDICAL NECESSITY DENIAL  Phone: [phone]  Fax: [phone]  Email: [email or web address]     TYPE OF REQUEST:  Inpatient Status     ADMISSION INFORMATION:  ADMISSION DATE/TIME: 1/30/22  2:42 AM  PATIENT DIAGNOSIS CODE/DESCRIPTION:  Back pain [M54 9]  CHF (congestive heart failure) (HCC) [I50 9]  UTI (urinary tract infection) [N39 0]  Chest pain [R07 9]  Elevated troponin [R77 8]  Uncontrolled hypertension [I10]  DISCHARGE DATE/TIME: No discharge date for patient encounter  DISCHARGE DISPOSITION (IF DISCHARGED): Home/Self Care     IMPORTANT INFORMATION:  Please contact the [facility] directly with any questions or concerns regarding this request  Department voicemails are confidential     Send requests for admission clinical reviews, concurrent reviews, approvals, and administrative denials due to lack of clinical to fax [phone]

## 2022-01-31 NOTE — QUICK NOTE
Patient with persistent tachycardia over telemetry in the 130s  Rhythm is SVT vs atrial flutter     Given Lopressor 5 mg x1 with no improvement  Given Amiodarone bolus 150 mg x1 and rate has eventually improved to the 80s  K and Mg within normal     Will hold on cardiology consult for now

## 2022-01-31 NOTE — ASSESSMENT & PLAN NOTE
· Pt presenting w/persistent tachycardia into 120s-130s  · Continue w/metoprolol succinate 150 mg BID  · Continue to monitor on telemetry  · Cardiology consulted - appreciate recommendations

## 2022-01-31 NOTE — ASSESSMENT & PLAN NOTE
Recent Labs     01/29/22  2312 01/30/22  0447 01/31/22  0421   CREATININE 1 69* 1 77* 2 11*   EGFR 33 31 25     Estimated Creatinine Clearance: 35 7 mL/min (A) (by C-G formula based on SCr of 2 11 mg/dL (H))      DANIELA noted this AM w/ Cr 2 11 (baseline 1 6-1 8)  UA: 1+ proteinuria, small leuks, otherwise unremarkable    Likely prerenal due to hypoperfusion, Entresto use vs  intrarenal 2/2 diuretics     Plan:  UA w/ microscopic, Urinary retention protocol, Bladder scan, Daily weights, I/O  Hold lasix for now  Start gentle IV Fluids: isolyte at 50 mL/hr  Monitor CMP daily and observe for downward trend of creatinine  Avoid hypoperfusion of the kidneys, minimize nephrotoxins
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
## 2022-01-31 NOTE — PROGRESS NOTES
[facility]  Progress Note - Xavier Altman 1965, 64 y o  female MRN: [id number]  Unit/Bed#: S -01 Encounter: [id number]  Primary Care Provider: Tegan Matias MD   Date and time admitted to hospital: 1/29/2022 10:46 PM    * Hypertensive urgency  Assessment & Plan  Vitals:    01/31/22 0537 01/31/22 0623 01/31/22 0734 01/31/22 0945   BP:   112/89 112/89   BP Location:   Right arm    Pulse:  88 (!) 113 (!) 120   Resp:   20    Temp:   97 6 °F (36 4 °C)    TempSrc:   Oral    SpO2:   94%    Weight: 97 7 kg (215 lb 6 2 oz)   97 5 kg (215 lb)   Height:    5' 7" (1 702 m)     · POA - pt w/pressures into 226M-463H systolic 2/2 antihypertensive med noncompliance  · S/p labetalol, nitro paste x2, lasix and hydralazine w/o improvement in BP  · Started on Cardene infusion, d/c'ed 1/30 after improvement of pressures  · Continue PTA antihypertensives  · Vitals per unit routine    Atrial flutter (HCC)  Assessment & Plan  · Pt presenting w/persistent tachycardia into 120s-130s  No response to IV metoprolol   Improved to 80s w/ IV amiodarone bolus 150 mg x1  · Continue daily CMP, mag  · Continue w/metoprolol succinate 150 mg BID  · Continue to monitor on telemetry  · Cardiology consulted - appreciate recommendations      Acute on chronic combined systolic and diastolic congestive heart failure (HCC)  Assessment & Plan  Wt Readings from Last 3 Encounters:   01/31/22 97 5 kg (215 lb)   01/22/22 97 6 kg (215 lb 1 6 oz)   12/09/21 94 3 kg (208 lb)       · Continue PTA entresto  · Lasix held due to mild DANIELA  · Last echo May 2021 showed EF 30% and marked left ventricular hypertrophy  · F/U repeat echo  · Daily weights  · Cardiology consulted - appreciate recommendations      Hepatitis C  Assessment & Plan  · Monitor LFTs    Nicotine dependence  Assessment & Plan  · Continue nicotine patch    CKD (chronic kidney disease) stage 3, GFR 30-59 ml/min (HCC)  Assessment & Plan    Recent Labs 22  2312 22  0447 22  0421   CREATININE 1 69* 1 77* 2 11*   EGFR 33 31 25     Estimated Creatinine Clearance: 35 7 mL/min (A) (by C-G formula based on SCr of 2 11 mg/dL (H))  DANIELA noted this AM w/ Cr 2 11 (baseline 1 6-1 8)  UA: 1+ proteinuria, small leuks, otherwise unremarkable    Likely prerenal due to hypoperfusion, Entresto use vs  intrarenal 2/2 diuretics     Plan:  UA w/ microscopic, Urinary retention protocol, Bladder scan, Daily weights, I/O  Hold lasix for now  Start gentle IV Fluids: isolyte at 50 mL/hr  Monitor CMP daily and observe for downward trend of creatinine  Avoid hypoperfusion of the kidneys, minimize nephrotoxins          VTE Pharmacologic Prophylaxis:   VTE Score: 2 Low Risk (Score 0-2) - Encourage Ambulation  Mechanical VTE Prophylaxis in Place: No    Patient Centered Rounds: I have performed bedside rounds with nursing staff today  Discussions with Specialists or Other Care Team Provider: None    Education and Discussions with Family / Patient: Attempted to update  () via phone  Unable to contact  Current Length of Stay: 1 day(s)    Current Patient Status: Inpatient     Discharge Plan / Estimated Discharge Date: Anticipate discharge in 48-72 hrs to discharge location to be determined pending rehab evaluations  Code Status: Level 1 - Full Code      Subjective:   Pt reports continued R flank pain  Worse when lies flat  Does not respond to prn oxycodone  Only improves with IV dilaudid  Pt says that her  felt "a lump" at painful site  Denies dysuria, hematuria  Objective:     Vitals:   Temp (24hrs), Av °F (36 7 °C), Min:97 6 °F (36 4 °C), Max:98 7 °F (37 1 °C)    Temp:  [97 6 °F (36 4 °C)-98 7 °F (37 1 °C)] 97 6 °F (36 4 °C)  HR:  [] 120  Resp:  [18-20] 20  BP: (112-141)/(64-97) 112/89  SpO2:  [94 %-97 %] 94 %  Body mass index is 33 67 kg/m²  Input and Output Summary (last 24 hours):        Intake/Output Summary (Last 24 hours) at 1/31/2022 1158  Last data filed at 1/31/2022 0842  Gross per 24 hour   Intake 492 ml   Output 3 ml   Net 489 ml       Physical Exam:     Physical Exam  Vitals and nursing note reviewed  Constitutional:       General: She is not in acute distress  Appearance: She is well-developed  HENT:      Head: Normocephalic and atraumatic  Nose: Nose normal       Mouth/Throat:      Mouth: Mucous membranes are moist       Pharynx: Oropharynx is clear  Eyes:      Conjunctiva/sclera: Conjunctivae normal    Cardiovascular:      Rate and Rhythm: Normal rate and regular rhythm  Heart sounds: No murmur heard  Pulmonary:      Effort: Pulmonary effort is normal  No respiratory distress  Breath sounds: Normal breath sounds  Abdominal:      Palpations: Abdomen is soft  Tenderness: There is no abdominal tenderness  There is right CVA tenderness  Musculoskeletal:         General: No swelling or tenderness  Cervical back: Neck supple  Skin:     General: Skin is warm and dry  Capillary Refill: Capillary refill takes less than 2 seconds  Neurological:      General: No focal deficit present  Mental Status: She is alert and oriented to person, place, and time            Additional Data:     Labs:  Results from last 7 days   Lab Units 01/30/22  0447   WBC Thousand/uL 8 20   HEMOGLOBIN g/dL 12 2   HEMATOCRIT % 38 6   PLATELETS Thousands/uL 162   NEUTROS PCT % 70   LYMPHS PCT % 23   MONOS PCT % 6   EOS PCT % 1     Results from last 7 days   Lab Units 01/31/22  0421 01/30/22  0447 01/29/22  2312   SODIUM mmol/L 137   < > 140   POTASSIUM mmol/L 4 0   < > 3 9   CHLORIDE mmol/L 102   < > 104   CO2 mmol/L 27   < > 30   BUN mg/dL 25   < > 18   CREATININE mg/dL 2 11*   < > 1 69*   ANION GAP mmol/L 8   < > 6   CALCIUM mg/dL 8 9   < > 9 1   ALBUMIN g/dL  --   --  4 0   TOTAL BILIRUBIN mg/dL  --   --  0 42   ALK PHOS U/L  --   --  69   ALT U/L  --   --  22   AST U/L  --   --  18   GLUCOSE RANDOM mg/dL 130   < > 90    < > = values in this interval not displayed  Results from last 7 days   Lab Units 01/29/22  2312   INR  1 17             Results from last 7 days   Lab Units 01/31/22  0421 01/30/22  0447   PROCALCITONIN ng/ml 0 05 <0 05       Imaging: No pertinent imaging reviewed  Recent Cultures (last 7 days):     Results from last 7 days   Lab Units 01/30/22  0010 01/29/22  2341 01/29/22  2312   BLOOD CULTURE   --  No Growth at 24 hrs  No Growth at 24 hrs  URINE CULTURE  20,000-29,000 cfu/ml   --   --        Lines/Drains:  Invasive Devices  Report    Peripheral Intravenous Line            Peripheral IV 01/31/22 Left;Ventral (anterior) Forearm <1 day                Telemetry:   Telemetry Orders (From admission, onward)             48 Hour Telemetry Monitoring  Continuous x 48 hours        References:    Telemetry Guidelines   Question:  Reason for 48 Hour Telemetry  Answer:  Arrhythmias Requiring Medical Therapy (eg  SVT, Vtach/fib, Bradycardia, Uncontrolled A-fib)                    Last 24 Hours Medication List:   Current Facility-Administered Medications   Medication Dose Route Frequency Provider Last Rate    acetaminophen  650 mg Oral [facility] Sid Bernstein MD      amiodarone  1 mg/min Intravenous Continuous CHARLOTTE Rice      amiodarone  200 mg Oral Daily With Breakfast Zeynep Alvarenga PA-C      HYDROmorphone  0 2 mg Intravenous Q4H PRN Sid Bernstein MD      metoprolol succinate  150 mg Oral BID Zeynep Alvarenga PA-C      nicotine  21 mg Transdermal Daily [place], [place]      oxyCODONE  5 mg Oral Q6H PRN Eva Ralph MD      rivaroxaban  15 mg Oral Daily With Charlie Espana PA-C      sacubitril-valsartan  1 tablet Oral BID Eva Ralph MD          Today, Patient Was Seen By: Mortimer Schiff, MD    ** Please Note: This note has been constructed using a voice recognition system   **

## 2022-01-31 NOTE — ASSESSMENT & PLAN NOTE
· Pt presenting w/persistent tachycardia into 120s-130s  No response to IV metoprolol   Improved to 80s w/ IV amiodarone bolus 150 mg x1  · Continue daily CMP, mag  · Continue w/metoprolol succinate 150 mg BID  · Continue to monitor on telemetry  · Cardiology consulted - appreciate recommendations

## 2022-01-31 NOTE — ASSESSMENT & PLAN NOTE
Vitals:    01/31/22 0537 01/31/22 0623 01/31/22 0734 01/31/22 0945   BP:   112/89 112/89   BP Location:   Right arm    Pulse:  88 (!) 113 (!) 120   Resp:   20    Temp:   97 6 °F (36 4 °C)    TempSrc:   Oral    SpO2:   94%    Weight: 97 7 kg (215 lb 6 2 oz)   97 5 kg (215 lb)   Height:    5' 7" (1 702 m)     · POA - pt w/pressures into 229T-433P systolic 2/2 antihypertensive med noncompliance  · S/p labetalol, nitro paste x2, lasix and hydralazine w/o improvement in BP  · Started on Cardene infusion, d/c'ed 1/30 after improvement of pressures  · Continue PTA antihypertensives  · Vitals per unit routine

## 2022-01-31 NOTE — ASSESSMENT & PLAN NOTE
Wt Readings from Last 3 Encounters:   01/31/22 97 5 kg (215 lb)   01/22/22 97 6 kg (215 lb 1 6 oz)   12/09/21 94 3 kg (208 lb)       · Continue PTA entresto  · Lasix held due to mild DANIELA  · Last echo May 2021 showed EF 30% and marked left ventricular hypertrophy  · F/U repeat echo  · Daily weights  · Cardiology consulted - appreciate recommendations

## 2022-01-31 NOTE — PLAN OF CARE
Problem: Potential for Falls  Goal: Patient will remain free of falls  Description: INTERVENTIONS:  - Educate patient/family on patient safety including physical limitations  - Instruct patient to call for assistance with activity   - Consult OT/PT to assist with strengthening/mobility   - Keep Call bell within reach  - Keep bed low and locked with side rails adjusted as appropriate  - Keep care items and personal belongings within reach  - Initiate and maintain comfort rounds  - Make Fall Risk Sign visible to staff  - Offer Toileting every 2 Hours, in advance of need  - Initiate/Maintain bed alarm  - Apply yellow socks and bracelet for high fall risk patients  - Consider moving patient to room near nurses station  Outcome: Progressing     Problem: PAIN - ADULT  Goal: Verbalizes/displays adequate comfort level or baseline comfort level  Description: Interventions:  - Encourage patient to monitor pain and request assistance  - Assess pain using appropriate pain scale  - Administer analgesics based on type and severity of pain and evaluate response  - Implement non-pharmacological measures as appropriate and evaluate response  - Consider cultural and social influences on pain and pain management  - Notify physician/advanced practitioner if interventions unsuccessful or patient reports new pain  Outcome: Progressing     Problem: INFECTION - ADULT  Goal: Absence or prevention of progression during hospitalization  Description: INTERVENTIONS:  - Assess and monitor for signs and symptoms of infection  - Monitor lab/diagnostic results  - Monitor all insertion sites, i e  indwelling lines, tubes, and drains  - Monitor endotracheal if appropriate and nasal secretions for changes in amount and color  - [place] appropriate cooling/warming therapies per order  - Administer medications as ordered  - Instruct and encourage patient and family to use good hand hygiene technique  - Identify and instruct in appropriate isolation precautions for identified infection/condition  Outcome: Progressing     Problem: Knowledge Deficit  Goal: Patient/family/caregiver demonstrates understanding of disease process, treatment plan, medications, and discharge instructions  Description: Complete learning assessment and assess knowledge base    Interventions:  - Provide teaching at level of understanding  - Provide teaching via preferred learning methods  Outcome: Progressing

## 2022-01-31 NOTE — PLAN OF CARE
Problem: Potential for Falls  Goal: Patient will remain free of falls  Description: INTERVENTIONS:  - Educate patient/family on patient safety including physical limitations  - Instruct patient to call for assistance with activity   - Consult OT/PT to assist with strengthening/mobility   - Keep Call bell within reach  - Keep bed low and locked with side rails adjusted as appropriate  - Keep care items and personal belongings within reach  - Initiate and maintain comfort rounds  - Make Fall Risk Sign visible to staff  - Apply yellow socks and bracelet for high fall risk patients  - Consider moving patient to room near nurses station  Outcome: Progressing     Problem: PAIN - ADULT  Goal: Verbalizes/displays adequate comfort level or baseline comfort level  Description: Interventions:  - Encourage patient to monitor pain and request assistance  - Assess pain using appropriate pain scale  - Administer analgesics based on type and severity of pain and evaluate response  - Implement non-pharmacological measures as appropriate and evaluate response  - Consider cultural and social influences on pain and pain management  - Notify physician/advanced practitioner if interventions unsuccessful or patient reports new pain  Outcome: Progressing     Problem: INFECTION - ADULT  Goal: Absence or prevention of progression during hospitalization  Description: INTERVENTIONS:  - Assess and monitor for signs and symptoms of infection  - Monitor lab/diagnostic results  - Monitor all insertion sites, i e  indwelling lines, tubes, and drains  - Monitor endotracheal if appropriate and nasal secretions for changes in amount and color  - [place] appropriate cooling/warming therapies per order  - Administer medications as ordered  - Instruct and encourage patient and family to use good hand hygiene technique  - Identify and instruct in appropriate isolation precautions for identified infection/condition  Outcome: Progressing     Problem: Knowledge Deficit  Goal: Patient/family/caregiver demonstrates understanding of disease process, treatment plan, medications, and discharge instructions  Description: Complete learning assessment and assess knowledge base    Interventions:  - Provide teaching at level of understanding  - Provide teaching via preferred learning methods  Outcome: Progressing

## 2022-02-01 LAB
ALBUMIN SERPL BCP-MCNC: 3.1 G/DL (ref 3.5–5)
ALP SERPL-CCNC: 55 U/L (ref 46–116)
ALT SERPL W P-5'-P-CCNC: 18 U/L (ref 12–78)
ANION GAP SERPL CALCULATED.3IONS-SCNC: 9 MMOL/L (ref 4–13)
AST SERPL W P-5'-P-CCNC: 21 U/L (ref 5–45)
BILIRUB SERPL-MCNC: 0.24 MG/DL (ref 0.2–1)
BUN SERPL-MCNC: 30 MG/DL (ref 5–25)
CALCIUM ALBUM COR SERPL-MCNC: 9.1 MG/DL (ref 8.3–10.1)
CALCIUM SERPL-MCNC: 8.4 MG/DL (ref 8.3–10.1)
CHLORIDE SERPL-SCNC: 102 MMOL/L (ref 100–108)
CO2 SERPL-SCNC: 25 MMOL/L (ref 21–32)
CREAT SERPL-MCNC: 1.74 MG/DL (ref 0.6–1.3)
GFR SERPL CREATININE-BSD FRML MDRD: 32 ML/MIN/1.73SQ M
GLUCOSE SERPL-MCNC: 107 MG/DL (ref 65–140)
MAGNESIUM SERPL-MCNC: 2.1 MG/DL (ref 1.6–2.6)
POTASSIUM SERPL-SCNC: 3.9 MMOL/L (ref 3.5–5.3)
PROT SERPL-MCNC: 6.4 G/DL (ref 6.4–8.2)
SODIUM SERPL-SCNC: 136 MMOL/L (ref 136–145)

## 2022-02-01 PROCEDURE — 83735 ASSAY OF MAGNESIUM: CPT

## 2022-02-01 PROCEDURE — 99232 SBSQ HOSP IP/OBS MODERATE 35: CPT | Performed by: FAMILY MEDICINE

## 2022-02-01 PROCEDURE — 99232 SBSQ HOSP IP/OBS MODERATE 35: CPT | Performed by: INTERNAL MEDICINE

## 2022-02-01 PROCEDURE — 80053 COMPREHEN METABOLIC PANEL: CPT

## 2022-02-01 RX ORDER — FUROSEMIDE 20 MG/1
20 TABLET ORAL DAILY
Status: DISCONTINUED | OUTPATIENT
Start: 2022-02-01 | End: 2022-02-02 | Stop reason: HOSPADM

## 2022-02-01 RX ORDER — METHOCARBAMOL 500 MG/1
500 TABLET, FILM COATED ORAL EVERY 6 HOURS PRN
Status: DISCONTINUED | OUTPATIENT
Start: 2022-02-01 | End: 2022-02-02 | Stop reason: HOSPADM

## 2022-02-01 RX ADMIN — SACUBITRIL AND VALSARTAN 1 TABLET: 49; 51 TABLET, FILM COATED ORAL at 09:21

## 2022-02-01 RX ADMIN — RIVAROXABAN 15 MG: 15 TABLET, FILM COATED ORAL at 15:49

## 2022-02-01 RX ADMIN — ACETAMINOPHEN 650 MG: 325 TABLET, FILM COATED ORAL at 14:09

## 2022-02-01 RX ADMIN — HYDROMORPHONE HYDROCHLORIDE 0.2 MG: 1 INJECTION, SOLUTION INTRAMUSCULAR; INTRAVENOUS; SUBCUTANEOUS at 18:16

## 2022-02-01 RX ADMIN — NICOTINE 21 MG: 21 PATCH, EXTENDED RELEASE TRANSDERMAL at 09:22

## 2022-02-01 RX ADMIN — OXYCODONE HYDROCHLORIDE 5 MG: 5 TABLET ORAL at 15:49

## 2022-02-01 RX ADMIN — ACETAMINOPHEN 650 MG: 325 TABLET, FILM COATED ORAL at 06:09

## 2022-02-01 RX ADMIN — SACUBITRIL AND VALSARTAN 1 TABLET: 49; 51 TABLET, FILM COATED ORAL at 18:05

## 2022-02-01 RX ADMIN — METOPROLOL SUCCINATE 150 MG: 100 TABLET, EXTENDED RELEASE ORAL at 09:21

## 2022-02-01 RX ADMIN — OXYCODONE HYDROCHLORIDE 5 MG: 5 TABLET ORAL at 09:26

## 2022-02-01 RX ADMIN — HYDROMORPHONE HYDROCHLORIDE 0.2 MG: 1 INJECTION, SOLUTION INTRAMUSCULAR; INTRAVENOUS; SUBCUTANEOUS at 06:11

## 2022-02-01 RX ADMIN — METHOCARBAMOL 500 MG: 500 TABLET ORAL at 21:22

## 2022-02-01 RX ADMIN — FUROSEMIDE 20 MG: 20 TABLET ORAL at 10:28

## 2022-02-01 RX ADMIN — ACETAMINOPHEN 650 MG: 325 TABLET, FILM COATED ORAL at 21:17

## 2022-02-01 RX ADMIN — OXYCODONE HYDROCHLORIDE 5 MG: 5 TABLET ORAL at 01:36

## 2022-02-01 RX ADMIN — METOPROLOL SUCCINATE 150 MG: 100 TABLET, EXTENDED RELEASE ORAL at 21:17

## 2022-02-01 RX ADMIN — AMIODARONE HYDROCHLORIDE 200 MG: 200 TABLET ORAL at 09:21

## 2022-02-01 NOTE — ASSESSMENT & PLAN NOTE
Recent Labs     01/30/22  0447 01/31/22  0421 02/01/22  0513   CREATININE 1 77* 2 11* 1 74*   EGFR 31 25 32     Estimated Creatinine Clearance: 43 3 mL/min (A) (by C-G formula based on SCr of 1 74 mg/dL (H))      DANIELA noted this AM w/ Cr 2 11 (baseline 1 6-1 8)  UA: 1+ proteinuria, small leuks, otherwise unremarkable    Likely prerenal due to hypoperfusion, Entresto use vs  intrarenal 2/2 diuretics     Plan:  UA w/ microscopic, Urinary retention protocol, Bladder scan, Daily weights, I/O  Hold lasix for now  Start gentle IV Fluids: isolyte at 50 mL/hr  Monitor CMP daily and observe for downward trend of creatinine  Avoid hypoperfusion of the kidneys, minimize nephrotoxins

## 2022-02-01 NOTE — ASSESSMENT & PLAN NOTE
· POA  Localized to mid axillary line, intercostal space  Worse with deep breaths and lying down  Improved with dilaudid  · No dysuria, hematuria, fevers, chills, N/V  UA unremarkable  CT c/a/p showing no acute pathology consistent with pain    · Suspect MSK etiology  · Continue PRN oxycodone, dilaudid

## 2022-02-01 NOTE — PROGRESS NOTES
[facility]  Progress Note - Lui Essex 1965, 64 y o  female MRN: [id number]  Unit/Bed#: S -01 Encounter: [id number]  Primary Care Provider: Mello Andino MD   Date and time admitted to hospital: 1/29/2022 10:46 PM    * Hypertensive urgency  Assessment & Plan  Vitals:    01/31/22 1521 01/31/22 2146 02/01/22 0639 02/01/22 1331   BP: 156/86 124/84 122/77 148/78   BP Location: Right arm Left arm Left arm Right arm   Pulse: (!) 123 80 59 63   Resp: 20 20 19 18   Temp: 97 8 °F (36 6 °C) 98 1 °F (36 7 °C) 98 2 °F (36 8 °C) 98 °F (36 7 °C)   TempSrc: Oral Oral Oral Oral   SpO2: 98% 97% 98% 94%   Weight:       Height:         · POA - pt w/pressures into [id number] systolic 2/2 antihypertensive med noncompliance  · S/p labetalol, nitro paste x2, lasix and hydralazine w/o improvement in BP  · Started on Cardene infusion, d/c'ed 1/30 after improvement of pressures  · Continue PTA antihypertensives  · Vitals per unit routine    Atrial flutter (HCC)  Assessment & Plan  · Pt presenting w/persistent tachycardia into 120s-130s  No response to IV metoprolol  Improved to 80s w/ IV amiodarone bolus 150 mg x1  Transitioned to NSR at 2 AM this morning (2/1/2022) after amiodarone drip  Currently rate controlled    · Continue daily CMP, mag  · Continue w/metoprolol succinate 150 mg BID  · Continue to monitor on telemetry  · Cardiology consulted - appreciate recommendations      Acute on chronic combined systolic and diastolic congestive heart failure (HCC)  Assessment & Plan  Wt Readings from Last 3 Encounters:   01/31/22 97 5 kg (215 lb)   01/22/22 97 6 kg (215 lb 1 6 oz)   12/09/21 94 3 kg (208 lb)       · Continue PTA entresto, toprol-XL  · Lasix held due to mild DANIELA  · Last echo May 2021 showed EF 30% and marked left ventricular hypertrophy  · Repeat echo 1/31/2022 showing EF 55%  · Daily weights  · Cardiology consulted - appreciate recommendations      Hepatitis C  Assessment & Plan  · Monitor LFTs    Nicotine dependence  Assessment & Plan  · Continue nicotine patch    CKD (chronic kidney disease) stage 3, GFR 30-59 ml/min [facility])  Assessment & Plan    Recent Labs     01/30/22  0447 01/31/22  0421 02/01/22  0513   CREATININE 1 77* 2 11* 1 74*   EGFR 31 25 32     Estimated Creatinine Clearance: 43 3 mL/min (A) (by C-G formula based on SCr of 1 74 mg/dL (H))  DANIELA noted 1/31/2022 w/ Cr 2 11 (baseline 1 6-1 8)  Improving with IVF  UA: 1+ proteinuria, small leuks, otherwise unremarkable    Likely prerenal due to hypoperfusion, Entresto use vs  intrarenal 2/2 diuretics     Plan:  UA w/ microscopic, Urinary retention protocol, Bladder scan, Daily weights, I/O  Hold lasix for now  Continue gentle IV Fluids: isolyte at 50 mL/hr  Monitor CMP daily and observe for downward trend of creatinine  Avoid hypoperfusion of the kidneys, minimize nephrotoxins      Acute right flank pain  Assessment & Plan  · POA  Localized to mid axillary line, intercostal space  Worse with deep breaths and lying down  Improved with dilaudid  · No dysuria, hematuria, fevers, chills, N/V  UA unremarkable  CT c/a/p showing no acute pathology consistent with pain  · Suspect MSK etiology  · Continue PRN oxycodone, dilaudid          VTE Pharmacologic Prophylaxis:   VTE Score: 2 Low Risk (Score 0-2) - Encourage Ambulation  Mechanical VTE Prophylaxis in Place: No    Patient Centered Rounds: I have performed bedside rounds with nursing staff today  Discussions with Specialists or Other Care Team Provider: none     Education and Discussions with Family / Patient: Updated  () via phone  Current Length of Stay: 2 day(s)    Current Patient Status: Inpatient     Discharge Plan / Estimated Discharge Date: Anticipate discharge in 48 hrs to home  Code Status: Level 1 - Full Code      Subjective:   Pt reports improved pain with IV dilaudid   She says that oxycodone helps but "it goes away after 5 minutes " Denies subjective fevers, chills, N/V, dysuria, hematuria, CP, SOB, palpitations  No other complaints  Objective:     Vitals:   Temp (24hrs), Av °F (36 7 °C), Min:97 8 °F (36 6 °C), Max:98 2 °F (36 8 °C)    Temp:  [97 8 °F (36 6 °C)-98 2 °F (36 8 °C)] 98 °F (36 7 °C)  HR:  [] 63  Resp:  [18-20] 18  BP: (122-156)/(77-86) 148/78  SpO2:  [94 %-98 %] 94 %  Body mass index is 33 67 kg/m²  Input and Output Summary (last 24 hours): Intake/Output Summary (Last 24 hours) at 2022 1445  Last data filed at 2022 7862  Gross per 24 hour   Intake 480 ml   Output 600 ml   Net -120 ml       Physical Exam:     Physical Exam  Vitals and nursing note reviewed  Constitutional:       General: She is not in acute distress  Appearance: She is well-developed  HENT:      Head: Normocephalic and atraumatic  Mouth/Throat:      Mouth: Mucous membranes are moist       Pharynx: Oropharynx is clear  Eyes:      Conjunctiva/sclera: Conjunctivae normal    Cardiovascular:      Rate and Rhythm: Normal rate and regular rhythm  Heart sounds: No murmur heard  Pulmonary:      Effort: Pulmonary effort is normal  No respiratory distress  Breath sounds: Normal breath sounds  Abdominal:      Palpations: Abdomen is soft  Tenderness: There is no abdominal tenderness  Musculoskeletal:         General: No swelling or tenderness  Cervical back: Neck supple  Skin:     General: Skin is warm and dry  Neurological:      General: No focal deficit present  Mental Status: She is alert            Additional Data:     Labs:  Results from last 7 days   Lab Units 22  0447   WBC Thousand/uL 8 20   HEMOGLOBIN g/dL 12 2   HEMATOCRIT % 38 6   PLATELETS Thousands/uL 162   NEUTROS PCT % 70   LYMPHS PCT % 23   MONOS PCT % 6   EOS PCT % 1     Results from last 7 days   Lab Units 22  0513   SODIUM mmol/L 136   POTASSIUM mmol/L 3 9   CHLORIDE mmol/L 102   CO2 mmol/L 25   BUN mg/dL 30* CREATININE mg/dL 1 74*   ANION GAP mmol/L 9   CALCIUM mg/dL 8 4   ALBUMIN g/dL 3 1*   TOTAL BILIRUBIN mg/dL 0 24   ALK PHOS U/L 55   ALT U/L 18   AST U/L 21   GLUCOSE RANDOM mg/dL 107     Results from last 7 days   Lab Units 01/29/22  2312   INR  1 17             Results from last 7 days   Lab Units 01/31/22  0421 01/30/22  0447   PROCALCITONIN ng/ml 0 05 <0 05       Imaging: No pertinent imaging reviewed  Recent Cultures (last 7 days):     Results from last 7 days   Lab Units 01/30/22  0010 01/29/22  2341 01/29/22  2312   BLOOD CULTURE   --  No Growth at 48 hrs  No Growth at 48 hrs  URINE CULTURE  20,000-29,000 cfu/ml   --   --        Lines/Drains:  Invasive Devices  Report    Peripheral Intravenous Line            Peripheral IV 01/31/22 Left;Ventral (anterior) Forearm 1 day    Peripheral IV 02/01/22 Dorsal (posterior); Left Forearm <1 day                Telemetry:   Telemetry Orders (From admission, onward)             48 Hour Telemetry Monitoring  Continuous x 48 hours        References:    Telemetry Guidelines   Question:  Reason for 48 Hour Telemetry  Answer:  Arrhythmias Requiring Medical Therapy (eg  SVT, Vtach/fib, Bradycardia, Uncontrolled A-fib)                    Last 24 Hours Medication List:   Current Facility-Administered Medications   Medication Dose Route Frequency Provider Last Rate    acetaminophen  650 mg Oral [facility] Sid Bernstein MD      amiodarone  200 mg Oral Daily With Breakfast Zeynep Alvarenga PA-C      furosemide  20 mg Oral Daily CHARLOTTE Rice      HYDROmorphone  0 2 mg Intravenous Q4H PRN Sid Bernstein MD      metoprolol succinate  150 mg Oral BID Zeynep Alvarenga PA-C      multi-electrolyte  50 mL/hr Intravenous Continuous Mortimer Schiff, MD 50 mL/hr (01/31/22 1312)    nicotine  21 mg Transdermal Daily Zeynep Alvarenga PA-C      oxyCODONE  5 mg Oral Q6H PRN Eva Ralph MD      rivaroxaban  15 mg Oral Daily With Charlie Espana PA-C      sacubitril-valsartan  1 tablet Oral BID Heriberto Zhou MD          Today, Patient Was Seen By: Blayne Ashely MD    ** Please Note: This note has been constructed using a voice recognition system   **

## 2022-02-01 NOTE — ASSESSMENT & PLAN NOTE
Wt Readings from Last 3 Encounters:   01/31/22 97 5 kg (215 lb)   01/22/22 97 6 kg (215 lb 1 6 oz)   12/09/21 94 3 kg (208 lb)       · Continue PTA entresto, toprol-XL  · Lasix held due to mild DANIELA  · Last echo May 2021 showed EF 30% and marked left ventricular hypertrophy  · Repeat echo 1/31/2022 showing EF 55%  · Daily weights  · Cardiology consulted - appreciate recommendations

## 2022-02-01 NOTE — ASSESSMENT & PLAN NOTE
Vitals:    01/31/22 1521 01/31/22 2146 02/01/22 0639 02/01/22 1331   BP: 156/86 124/84 122/77 148/78   BP Location: Right arm Left arm Left arm Right arm   Pulse: (!) 123 80 59 63   Resp: 20 20 19 18   Temp: 97 8 °F (36 6 °C) 98 1 °F (36 7 °C) 98 2 °F (36 8 °C) 98 °F (36 7 °C)   TempSrc: Oral Oral Oral Oral   SpO2: 98% 97% 98% 94%   Weight:       Height:         · POA - pt w/pressures into [id number]-356Z systolic 2/2 antihypertensive med noncompliance  · S/p labetalol, nitro paste x2, lasix and hydralazine w/o improvement in BP  · Started on Cardene infusion, d/c'ed 1/30 after improvement of pressures  · Continue PTA antihypertensives  · Vitals per unit routine

## 2022-02-01 NOTE — QUICK NOTE
I received a TT from the nurse, stating that the patient was complaining of pain at her IV site  She is receiving Amiodarone drip  On exam, the skin around the site was slightly erythematous, tender to palpation, showing clear signs of infiltration     I advised the nurse to replace the IV to continue amio gtt  She will apply ice to the previous site and continue to monitor her

## 2022-02-01 NOTE — PROGRESS NOTES
Patient on amiodarone drip complaining of pain at IV site  On examination, IV appeared to be infiltrated  Skin around site was erythematous and tender to touch  Resident was notified  Advised to place new IV to resume amiodarone drip and to elevate and apply ice to infiltrated site  Will continue to monitor

## 2022-02-01 NOTE — ASSESSMENT & PLAN NOTE
Recent Labs     01/30/22  0447 01/31/22  0421 02/01/22  0513   CREATININE 1 77* 2 11* 1 74*   EGFR 31 25 32     Estimated Creatinine Clearance: 43 3 mL/min (A) (by C-G formula based on SCr of 1 74 mg/dL (H))  DANIELA noted 1/31/2022 w/ Cr 2 11 (baseline 1 6-1 8)  improved with IVF    UA: 1+ proteinuria, small leuks, otherwise unremarkable    Likely prerenal due to hypoperfusion, Entresto use vs  intrarenal 2/2 diuretics     Plan:    Continue outpatient follow up  Encourage PO hydration

## 2022-02-01 NOTE — ASSESSMENT & PLAN NOTE
· Pt presenting w/persistent tachycardia into 120s-130s  No response to IV metoprolol  Improved to 80s w/ IV amiodarone bolus 150 mg x1  Transitioned to NSR at 2 AM this morning (2/1/2022) after amiodarone drip  Currently rate controlled    · Continue daily CMP, mag  · Continue w/metoprolol succinate 150 mg BID  · Continue to monitor on telemetry  · Cardiology consulted - appreciate recommendations

## 2022-02-02 VITALS
DIASTOLIC BLOOD PRESSURE: 99 MMHG | TEMPERATURE: 98 F | BODY MASS INDEX: 34.29 KG/M2 | OXYGEN SATURATION: 95 % | RESPIRATION RATE: 19 BRPM | HEIGHT: 67 IN | WEIGHT: 218.48 LBS | SYSTOLIC BLOOD PRESSURE: 137 MMHG | HEART RATE: 60 BPM

## 2022-02-02 LAB
ALBUMIN SERPL BCP-MCNC: 3.6 G/DL (ref 3.5–5)
ALP SERPL-CCNC: 53 U/L (ref 46–116)
ALT SERPL W P-5'-P-CCNC: 26 U/L (ref 12–78)
ANION GAP SERPL CALCULATED.3IONS-SCNC: 5 MMOL/L (ref 4–13)
AST SERPL W P-5'-P-CCNC: 25 U/L (ref 5–45)
BILIRUB SERPL-MCNC: 0.33 MG/DL (ref 0.2–1)
BUN SERPL-MCNC: 27 MG/DL (ref 5–25)
CALCIUM SERPL-MCNC: 9 MG/DL (ref 8.3–10.1)
CHLORIDE SERPL-SCNC: 103 MMOL/L (ref 100–108)
CO2 SERPL-SCNC: 27 MMOL/L (ref 21–32)
CREAT SERPL-MCNC: 1.74 MG/DL (ref 0.6–1.3)
GFR SERPL CREATININE-BSD FRML MDRD: 32 ML/MIN/1.73SQ M
GLUCOSE SERPL-MCNC: 99 MG/DL (ref 65–140)
MAGNESIUM SERPL-MCNC: 2.3 MG/DL (ref 1.6–2.6)
POTASSIUM SERPL-SCNC: 4.5 MMOL/L (ref 3.5–5.3)
PROT SERPL-MCNC: 7.3 G/DL (ref 6.4–8.2)
SODIUM SERPL-SCNC: 135 MMOL/L (ref 136–145)

## 2022-02-02 PROCEDURE — 80053 COMPREHEN METABOLIC PANEL: CPT

## 2022-02-02 PROCEDURE — 99238 HOSP IP/OBS DSCHRG MGMT 30/<: CPT | Performed by: FAMILY MEDICINE

## 2022-02-02 PROCEDURE — 83735 ASSAY OF MAGNESIUM: CPT

## 2022-02-02 RX ORDER — AMIODARONE HYDROCHLORIDE 200 MG/1
200 TABLET ORAL DAILY
Qty: 30 TABLET | Refills: 0 | Status: SHIPPED | OUTPATIENT
Start: 2022-02-02 | End: 2022-06-23

## 2022-02-02 RX ORDER — METOPROLOL SUCCINATE 50 MG/1
150 TABLET, EXTENDED RELEASE ORAL 2 TIMES DAILY
Qty: 60 TABLET | Refills: 0 | Status: SHIPPED | OUTPATIENT
Start: 2022-02-02 | End: 2022-02-10

## 2022-02-02 RX ORDER — METHOCARBAMOL 500 MG/1
500 TABLET, FILM COATED ORAL EVERY 6 HOURS PRN
Qty: 20 TABLET | Refills: 0 | Status: SHIPPED | OUTPATIENT
Start: 2022-02-02 | End: 2022-06-17

## 2022-02-02 RX ADMIN — FUROSEMIDE 20 MG: 20 TABLET ORAL at 08:38

## 2022-02-02 RX ADMIN — ACETAMINOPHEN 650 MG: 325 TABLET, FILM COATED ORAL at 05:56

## 2022-02-02 RX ADMIN — OXYCODONE HYDROCHLORIDE 5 MG: 5 TABLET ORAL at 08:50

## 2022-02-02 RX ADMIN — OXYCODONE HYDROCHLORIDE 5 MG: 5 TABLET ORAL at 01:11

## 2022-02-02 RX ADMIN — METHOCARBAMOL 500 MG: 500 TABLET ORAL at 13:15

## 2022-02-02 RX ADMIN — SACUBITRIL AND VALSARTAN 1 TABLET: 49; 51 TABLET, FILM COATED ORAL at 08:38

## 2022-02-02 RX ADMIN — AMIODARONE HYDROCHLORIDE 200 MG: 200 TABLET ORAL at 08:38

## 2022-02-02 RX ADMIN — METOPROLOL SUCCINATE 150 MG: 100 TABLET, EXTENDED RELEASE ORAL at 08:38

## 2022-02-02 RX ADMIN — HYDROMORPHONE HYDROCHLORIDE 0.2 MG: 1 INJECTION, SOLUTION INTRAMUSCULAR; INTRAVENOUS; SUBCUTANEOUS at 05:59

## 2022-02-02 RX ADMIN — ACETAMINOPHEN 650 MG: 325 TABLET, FILM COATED ORAL at 13:15

## 2022-02-02 NOTE — ASSESSMENT & PLAN NOTE
· POA  Localized to mid axillary line, intercostal space  Worse with deep breaths and lying down  Improved with dilaudid  · No dysuria, hematuria, fevers, chills, N/V  UA unremarkable  CT c/a/p showing no acute pathology consistent with pain    · Suspect MSK etiology  · Order robaxin on discharge

## 2022-02-02 NOTE — ASSESSMENT & PLAN NOTE
· Pt presenting w/persistent tachycardia into 120s-130s  No response to IV metoprolol  Improved to 80s w/ IV amiodarone bolus 150 mg x1  Transitioned to NSR at 2 AM (2/1/2022) after amiodarone drip  Currently rate controlled    · Continue w/metoprolol succinate 150 mg BID  · Continue Amiodarone 200mg daily  · Follow up cardiology

## 2022-02-02 NOTE — ASSESSMENT & PLAN NOTE
Vitals:    02/01/22 1331 02/01/22 2041 02/02/22 0600 02/02/22 0700   BP: 148/78 120/66  137/99   BP Location: Right arm Right arm  Right arm   Pulse: 63 59  60   Resp: 18 17  19   Temp: 98 °F (36 7 °C) 97 8 °F (36 6 °C)  98 °F (36 7 °C)   TempSrc: Oral Oral  Oral   SpO2: 94% 94%  95%   Weight:   99 1 kg (218 lb 7 6 oz)    Height:         · POA - pt w/pressures into 786V-284V systolic 2/2 antihypertensive med noncompliance  · S/p labetalol, nitro paste x2, lasix and hydralazine w/o improvement in BP  · Started on Cardene infusion, d/c'ed 1/30 after improvement of pressures  · Check BP at home  · Continue home antihypertensives  · Follow up Cardiology and Primary Care

## 2022-02-02 NOTE — PLAN OF CARE
Problem: Potential for Falls  Goal: Patient will remain free of falls  Description: INTERVENTIONS:  - Educate patient/family on patient safety including physical limitations  - Instruct patient to call for assistance with activity   - Consult OT/PT to assist with strengthening/mobility   - Keep Call bell within reach  - Keep bed low and locked with side rails adjusted as appropriate  - Keep care items and personal belongings within reach  - Initiate and maintain comfort rounds  - Make Fall Risk Sign visible to staff  - Apply yellow socks and bracelet for high fall risk patients  - Consider moving patient to room near nurses station  Outcome: Progressing     Problem: PAIN - ADULT  Goal: Verbalizes/displays adequate comfort level or baseline comfort level  Description: Interventions:  - Encourage patient to monitor pain and request assistance  - Assess pain using appropriate pain scale  - Administer analgesics based on type and severity of pain and evaluate response  - Implement non-pharmacological measures as appropriate and evaluate response  - Consider cultural and social influences on pain and pain management  - Notify physician/advanced practitioner if interventions unsuccessful or patient reports new pain  Outcome: Progressing     Problem: INFECTION - ADULT  Goal: Absence or prevention of progression during hospitalization  Description: INTERVENTIONS:  - Assess and monitor for signs and symptoms of infection  - Monitor lab/diagnostic results  - Monitor all insertion sites, i e  indwelling lines, tubes, and drains  - Monitor endotracheal if appropriate and nasal secretions for changes in amount and color  - [place] appropriate cooling/warming therapies per order  - Administer medications as ordered  - Instruct and encourage patient and family to use good hand hygiene technique  - Identify and instruct in appropriate isolation precautions for identified infection/condition  Outcome: Progressing     Problem: Knowledge Deficit  Goal: Patient/family/caregiver demonstrates understanding of disease process, treatment plan, medications, and discharge instructions  Description: Complete learning assessment and assess knowledge base    Interventions:  - Provide teaching at level of understanding  - Provide teaching via preferred learning methods  Outcome: Progressing

## 2022-02-02 NOTE — ASSESSMENT & PLAN NOTE
Wt Readings from Last 3 Encounters:   02/02/22 99 1 kg (218 lb 7 6 oz)   01/22/22 97 6 kg (215 lb 1 6 oz)   12/09/21 94 3 kg (208 lb)       · Continue PTA entresto, toprol- BID  · Last echo May 2021 showed EF 30% and marked left ventricular hypertrophy  · Repeat echo 1/31/2022 showing EF 55%  · Follow up with cardiology and Primary Care

## 2022-02-02 NOTE — DISCHARGE SUMMARY
[facility]  Discharge- Loreta Venturaor 1965, 64 y o  female MRN: [id number]  Unit/Bed#: S -01 Encounter: [id number]  Primary Care Provider: Ryder Mcgarry MD   Date and time admitted to hospital: 1/29/2022 10:46 PM    * Hypertensive urgency  Assessment & Plan  Vitals:    02/01/22 1331 02/01/22 2041 02/02/22 0600 02/02/22 0700   BP: 148/78 120/66  137/99   BP Location: Right arm Right arm  Right arm   Pulse: 63 59  60   Resp: 18 17  19   Temp: 98 °F (36 7 °C) 97 8 °F (36 6 °C)  98 °F (36 7 °C)   TempSrc: Oral Oral  Oral   SpO2: 94% 94%  95%   Weight:   99 1 kg (218 lb 7 6 oz)    Height:         · POA - pt w/pressures into 879O-900C systolic 2/2 antihypertensive med noncompliance  · S/p labetalol, nitro paste x2, lasix and hydralazine w/o improvement in BP  · Started on Cardene infusion, d/c'ed 1/30 after improvement of pressures  · Check BP at home  · Continue home antihypertensives  · Follow up Cardiology and Primary Care    Atrial flutter ([address] )  Assessment & Plan  · Pt presenting w/persistent tachycardia into 120s-130s  No response to IV metoprolol  Improved to 80s w/ IV amiodarone bolus 150 mg x1  Transitioned to NSR at 2 AM (2/1/2022) after amiodarone drip  Currently rate controlled  · Continue w/metoprolol succinate 150 mg BID  · Continue Amiodarone 200mg daily  · Follow up cardiology      CKD (chronic kidney disease) stage 3, GFR 30-59 ml/min [facility])  Assessment & Plan    Recent Labs     01/30/22  0447 01/31/22  0421 02/01/22  0513   CREATININE 1 77* 2 11* 1 74*   EGFR 31 25 32     Estimated Creatinine Clearance: 43 3 mL/min (A) (by C-G formula based on SCr of 1 74 mg/dL (H))  DANIELA noted 1/31/2022 w/ Cr 2 11 (baseline 1 6-1 8)  improved with IVF    UA: 1+ proteinuria, small leuks, otherwise unremarkable    Likely prerenal due to hypoperfusion, Entresto use vs  intrarenal 2/2 diuretics     Plan:    Continue outpatient follow up  Encourage PO hydration      Hepatitis C  Assessment & Plan  · Follow up outpatient    Acute right flank pain  Assessment & Plan  · POA  Localized to mid axillary line, intercostal space  Worse with deep breaths and lying down  Improved with dilaudid  · No dysuria, hematuria, fevers, chills, N/V  UA unremarkable  CT c/a/p showing no acute pathology consistent with pain  · Suspect MSK etiology  · Order robaxin on discharge    Acute on chronic combined systolic and diastolic congestive heart failure (HCC)  Assessment & Plan  Wt Readings from Last 3 Encounters:   02/02/22 99 1 kg (218 lb 7 6 oz)   01/22/22 97 6 kg (215 lb 1 6 oz)   12/09/21 94 3 kg (208 lb)       · Continue PTA entresto, toprol- BID  · Last echo May 2021 showed EF 30% and marked left ventricular hypertrophy  · Repeat echo 1/31/2022 showing EF 55%  · Follow up with cardiology and Primary Care      Nicotine dependence  Assessment & Plan  · Encourage smoking cessation    Discharging Resident Physician: Noel Orta MD  Attending: No att  providers found  PCP: Nicholas Flaherty MD  Admission Date: 1/29/2022  Discharge Date: 02/02/22    Disposition:     Home    Reason for Admission: Hypertensive Emergency    Consultations During Hospital Stay:  · Cardiology    Procedures Performed:     · None    Significant Findings / Test Results:     · None    Incidental Findings:   · None     Test Results Pending at Discharge (will require follow up): · None     Outpatient Tests Requested:  · EP    Complications:  None    Hospital Course:     Renard Ralph is a 64 y o  female patient who originally presented to the hospital on 1/29/2022 due to hypertensive urgency  Patient reported non-compliance with home meds  BP improved after Cardene drip was started  DANIELA noted on metabolic panel, lasix held  Patients heart rates were highly elevated, found to be in atrial flutter  Placed on amiodarone drip  Heart rates improved and amiodarone switched to PO    Patient did also complain of right-sided flank pain  Workup was negative  Most likely due to musculoskeletal pain, order Robaxin on discharge  Patient to have EP outpatient  Patient stable for discharge home with close follow up with Cardiology and PCP  Condition at Discharge: stable     Discharge Day Visit / Exam:     Subjective:  Was seen at bedside resting comfortably  States that she is ready for discharge and has no new complaints today  We discussed required outpatient follow-up and to continue amiodarone 200 mg daily  Toprol was also increased to 150 mg b i d   Vitals: Blood Pressure: 137/99 (02/02/22 0700)  Pulse: 60 (02/02/22 0700)  Temperature: 98 °F (36 7 °C) (02/02/22 0700)  Temp Source: Oral (02/02/22 0700)  Respirations: 19 (02/02/22 0700)  Height: 5' 7" (170 2 cm) (01/31/22 0945)  Weight - Scale: 99 1 kg (218 lb 7 6 oz) (02/02/22 0600)  SpO2: 95 % (02/02/22 0700)  Exam:   Physical Exam  Vitals and nursing note reviewed  Constitutional:       General: She is not in acute distress  Appearance: Normal appearance  She is well-developed  She is not diaphoretic  HENT:      Head: Normocephalic and atraumatic  Eyes:      General: No scleral icterus  Right eye: No discharge  Left eye: No discharge  Conjunctiva/sclera: Conjunctivae normal    Cardiovascular:      Rate and Rhythm: Normal rate and regular rhythm  Heart sounds: Normal heart sounds  No murmur heard  Pulmonary:      Effort: Pulmonary effort is normal  No respiratory distress  Breath sounds: Normal breath sounds  No wheezing  Abdominal:      General: There is no distension  Palpations: Abdomen is soft  Tenderness: There is no abdominal tenderness  Musculoskeletal:         General: No tenderness  Normal range of motion  Skin:     General: Skin is warm and dry  Coloration: Skin is not pale  Findings: No erythema  Neurological:      Mental Status: She is alert     Psychiatric:         Behavior: Behavior normal  Discussion with Family: Discussed with patient    Discharge instructions/Information to patient and family:   See after visit summary for information provided to patient and family  Provisions for Follow-Up Care:  See after visit summary for information related to follow-up care and any pertinent home health orders  Planned Readmission: None     Discharge Medications:  See after visit summary for reconciled discharge medications provided to patient and family        ** Please Note: This note has been constructed using a voice recognition system **

## 2022-02-02 NOTE — DISCHARGE INSTR - AVS FIRST PAGE
Dear Rahel Osuna,     It was our pleasure to care for you here at [facility]  It is our hope that we were always able to exceed the expected standards for your care during your stay  You were hospitalized due to Elevated Blood Pressures  You were cared for on the 3rd floor by Keysha Torres MD under the service of Crow Huber, DO with the [facility] who covers for your primary care physician (PCP), Litzy Chopra MD, while you were hospitalized  If you have any questions or concerns related to this hospitalization, you may contact us at [phone]  For follow up as well as any medication refills, we recommend that you follow up with your primary care physician  A registered nurse will reach out to you by phone within a few days after your discharge to answer any additional questions that you may have after going home  However, at this time we provide for you here, the most important instructions / recommendations at discharge:     Notable Medication Adjustments -   Start Amiodarone 200mg daily  Important follow up information -   Primary Care Doctor  Cardiology  Please review this entire after visit summary as additional general instructions including medication list, appointments, activity, diet, any pertinent wound care, and other additional recommendations from your care team that may be provided for you        Sincerely,     Keysha Torres MD

## 2022-02-03 NOTE — UTILIZATION REVIEW
Notification of Discharge   This is a Notification of Discharge from our facility [address]  Please be advised that this patient has been discharge from our facility  Below you will find the admission and discharge date and time including the patients disposition  UTILIZATION REVIEW CONTACT:  Bri Raymond MA  Utilization   Network Utilization Review Department  Phone: [phone] x carefully listen to the prompts  All voicemails are confidential   Email: [email or web address]     PHYSICIAN ADVISORY SERVICES:  FOR [id number] REVIEW - MEDICAL NECESSITY DENIAL  Phone: [phone]  Fax: [phone]  Email: [email or web address]     PRESENTATION DATE: 1/29/2022 10:46 PM  OBERVATION ADMISSION DATE:   INPATIENT ADMISSION DATE: 1/30/22  2:42 AM   DISCHARGE DATE: 2/2/2022  1:28 PM  DISPOSITION: Home/Self Care Home/Self Care      IMPORTANT INFORMATION:  Send all requests for admission clinical reviews, approved or denied determinations and any other requests to dedicated fax number below belonging to the campus where the patient is receiving treatment   List of dedicated fax numbers:  [address] DENIALS (Administrative/Medical Necessity) [phone]   [address] (Maternity/NICU/Pediatrics) [phone]   Lilia Kaur [phone]   [facility] [phone]   Jody Dunham [phone]   [address] [phone]   [facility]  [phone]   [address] [place] [address], [facility]  [address] [phone]

## 2022-02-04 LAB
BACTERIA BLD CULT: NORMAL
BACTERIA BLD CULT: NORMAL

## 2022-02-05 ENCOUNTER — APPOINTMENT (EMERGENCY)
Dept: CT IMAGING | Facility: HOSPITAL | Age: 57
End: 2022-02-05
Payer: COMMERCIAL

## 2022-02-05 ENCOUNTER — APPOINTMENT (EMERGENCY)
Dept: RADIOLOGY | Facility: HOSPITAL | Age: 57
End: 2022-02-05
Payer: COMMERCIAL

## 2022-02-05 ENCOUNTER — HOSPITAL ENCOUNTER (EMERGENCY)
Facility: HOSPITAL | Age: 57
Discharge: HOME | End: 2022-02-05
Attending: EMERGENCY MEDICINE | Admitting: EMERGENCY MEDICINE
Payer: COMMERCIAL

## 2022-02-05 VITALS
OXYGEN SATURATION: 98 % | TEMPERATURE: 98.6 F | HEART RATE: 62 BPM | SYSTOLIC BLOOD PRESSURE: 179 MMHG | DIASTOLIC BLOOD PRESSURE: 99 MMHG | RESPIRATION RATE: 18 BRPM

## 2022-02-05 DIAGNOSIS — S02.2XXA CLOSED FRACTURE OF NASAL BONE, INITIAL ENCOUNTER: Primary | ICD-10-CM
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
## 2022-02-05 PROCEDURE — 70450 CT HEAD/BRAIN W/O DYE: CPT

## 2022-02-05 PROCEDURE — 99284 EMERGENCY DEPT VISIT MOD MDM: CPT | Performed by: PHYSICIAN ASSISTANT

## 2022-02-05 PROCEDURE — 70486 CT MAXILLOFACIAL W/O DYE: CPT

## 2022-02-05 PROCEDURE — 73564 X-RAY EXAM KNEE 4 OR MORE: CPT

## 2022-02-05 PROCEDURE — 99284 EMERGENCY DEPT VISIT MOD MDM: CPT

## 2022-02-05 PROCEDURE — G1004 CDSM NDSC: HCPCS

## 2022-02-05 PROCEDURE — 72125 CT NECK SPINE W/O DYE: CPT

## 2022-02-05 PROCEDURE — 73610 X-RAY EXAM OF ANKLE: CPT

## 2022-02-05 RX ORDER — OXYCODONE HYDROCHLORIDE 5 MG/1
5 TABLET ORAL EVERY 6 HOURS PRN
Qty: 5 TABLET | Refills: 0 | Status: SHIPPED | OUTPATIENT
Start: 2022-02-05 | End: 2022-02-07 | Stop reason: SDUPTHER

## 2022-02-05 RX ORDER — OXYCODONE HYDROCHLORIDE 5 MG/1
2.5 TABLET ORAL ONCE
Status: COMPLETED | OUTPATIENT
Start: 2022-02-05 | End: 2022-02-05

## 2022-02-05 RX ADMIN — OXYCODONE HYDROCHLORIDE 2.5 MG: 5 TABLET ORAL at 13:53
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
## 2022-02-05 NOTE — ED NOTES
Patient playing on cell phone, bed in low and locked position, no apparent distress        Yassine Izquierdo RN  02/05/22 5407

## 2022-02-05 NOTE — DISCHARGE INSTRUCTIONS
Please return to the emergency department for worsening symptoms including chest pain, shortness of breath, dizziness, lightheadedness, fever greater than 103, severe pain, inability to walk, fainting episodes, etc  Please follow-up with your family practice provider as soon as possible  Please do not blow your nose until you see an otolaryngologist   Please follow-up with an otolaryngologist and a plastic surgeon as soon as possible  I have given you information of follow-up with them as soon as possible  Please call and make an appointment  I have sent a narcotic pain medication over to your pharmacy  You may take this if you needed for pain  Please do not drive or operate motor machinery on this medication  Please also did not go to work while taking this medication  Please do not give this medication to anybody else and use it exactly as prescribed

## 2022-02-07 RX ORDER — OXYCODONE HYDROCHLORIDE 5 MG/1
5 TABLET ORAL EVERY 6 HOURS PRN
Qty: 5 TABLET | Refills: 0 | Status: SHIPPED | OUTPATIENT
Start: 2022-02-07 | End: 2022-02-17

## 2022-02-10 DIAGNOSIS — I48.0 PAROXYSMAL A-FIB (HCC): ICD-10-CM

## 2022-02-10 DIAGNOSIS — I48.91 ATRIAL FIBRILLATION, UNSPECIFIED TYPE (HCC): Chronic | ICD-10-CM

## 2022-02-10 RX ORDER — RIVAROXABAN 20 MG/1
TABLET, FILM COATED ORAL
Qty: 30 TABLET | Refills: 1 | Status: SHIPPED | OUTPATIENT
Start: 2022-02-10 | End: 2022-04-09 | Stop reason: HOSPADM

## 2022-02-10 RX ORDER — METOPROLOL SUCCINATE 50 MG/1
TABLET, EXTENDED RELEASE ORAL
Qty: 180 TABLET | Refills: 3 | Status: SHIPPED | OUTPATIENT
Start: 2022-02-10 | End: 2022-06-07

## 2022-03-15 ENCOUNTER — APPOINTMENT (EMERGENCY)
Dept: RADIOLOGY | Facility: HOSPITAL | Age: 57
End: 2022-03-15
Payer: COMMERCIAL

## 2022-03-15 ENCOUNTER — HOSPITAL ENCOUNTER (EMERGENCY)
Facility: HOSPITAL | Age: 57
Discharge: HOME | End: 2022-03-15
Attending: EMERGENCY MEDICINE | Admitting: EMERGENCY MEDICINE
Payer: COMMERCIAL

## 2022-03-15 VITALS
DIASTOLIC BLOOD PRESSURE: 83 MMHG | RESPIRATION RATE: 18 BRPM | SYSTOLIC BLOOD PRESSURE: 169 MMHG | HEART RATE: 60 BPM | OXYGEN SATURATION: 98 % | TEMPERATURE: 98 F

## 2022-03-15 DIAGNOSIS — M25.552 LEFT HIP PAIN: Primary | ICD-10-CM

## 2022-03-15 PROCEDURE — 99284 EMERGENCY DEPT VISIT MOD MDM: CPT | Performed by: PHYSICIAN ASSISTANT

## 2022-03-15 PROCEDURE — 73502 X-RAY EXAM HIP UNI 2-3 VIEWS: CPT

## 2022-03-15 PROCEDURE — 99283 EMERGENCY DEPT VISIT LOW MDM: CPT

## 2022-03-15 RX ORDER — LIDOCAINE 50 MG/G
1 PATCH TOPICAL EVERY 24 HOURS
Qty: 15 PATCH | Refills: 0 | Status: SHIPPED | OUTPATIENT
Start: 2022-03-15 | End: 2022-06-17

## 2022-03-15 NOTE — DISCHARGE INSTRUCTIONS
Please return to the emergency department for worsening symptoms including chest pain, shortness of breath, dizziness, lightheadedness, fever greater than 103, severe pain, inability to walk, fainting episodes, etc  Please follow-up with your family practice provider as soon as possible  I have sent a pain medication over to your pharmacy  Please take this medication as prescribed  Please follow-up with your orthopedic surgeon as soon as possible  He would like to evaluate you in person

## 2022-03-18 ENCOUNTER — APPOINTMENT (EMERGENCY)
Dept: RADIOLOGY | Facility: HOSPITAL | Age: 57
End: 2022-03-18
Payer: COMMERCIAL

## 2022-03-18 ENCOUNTER — HOSPITAL ENCOUNTER (EMERGENCY)
Facility: HOSPITAL | Age: 57
Discharge: HOME | End: 2022-03-18
Attending: EMERGENCY MEDICINE | Admitting: EMERGENCY MEDICINE
Payer: COMMERCIAL

## 2022-03-18 ENCOUNTER — APPOINTMENT (EMERGENCY)
Dept: CT IMAGING | Facility: HOSPITAL | Age: 57
End: 2022-03-18
Payer: COMMERCIAL

## 2022-03-18 VITALS
OXYGEN SATURATION: 99 % | RESPIRATION RATE: 18 BRPM | DIASTOLIC BLOOD PRESSURE: 93 MMHG | SYSTOLIC BLOOD PRESSURE: 185 MMHG | TEMPERATURE: 97.4 F | HEART RATE: 60 BPM

## 2022-03-18 DIAGNOSIS — S90.30XA CONTUSION OF FOOT INCLUDING TOES, INITIAL ENCOUNTER: ICD-10-CM

## 2022-03-18 DIAGNOSIS — S90.129A CONTUSION OF FOOT INCLUDING TOES, INITIAL ENCOUNTER: ICD-10-CM
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
## 2022-03-18 DIAGNOSIS — M54.9 BACK PAIN: ICD-10-CM

## 2022-03-18 PROCEDURE — 99284 EMERGENCY DEPT VISIT MOD MDM: CPT

## 2022-03-18 PROCEDURE — 72192 CT PELVIS W/O DYE: CPT

## 2022-03-18 PROCEDURE — 73630 X-RAY EXAM OF FOOT: CPT

## 2022-03-18 PROCEDURE — 99284 EMERGENCY DEPT VISIT MOD MDM: CPT | Performed by: EMERGENCY MEDICINE

## 2022-03-18 PROCEDURE — 72131 CT LUMBAR SPINE W/O DYE: CPT

## 2022-03-18 RX ORDER — ACETAMINOPHEN 325 MG/1
650 TABLET ORAL ONCE
Status: COMPLETED | OUTPATIENT
Start: 2022-03-18 | End: 2022-03-18

## 2022-03-18 RX ADMIN — ACETAMINOPHEN 650 MG: 325 TABLET, FILM COATED ORAL at 17:09

## 2022-03-18 NOTE — ED NOTES
Provided with Airselect short boot for left lower ext per verbal order from Dr Kathy Manning, JAE  03/18/22 1941

## 2022-03-18 NOTE — Clinical Note
Darwin Mann was seen and treated in our emergency department on 3/18/2022  Diagnosis:     Tigre Skinner    She may return on this date: Off work thru march 25th as needed; If you have any questions or concerns, please don't hesitate to call        Dereck Goodell, MD    ______________________________           _______________          _______________  [facility]                              Date                                Time

## 2022-03-18 NOTE — DISCHARGE INSTRUCTIONS
Use boot to help with walking, but avoid walking or other activities that cause pain in foot, hip , or back;     [facility]    Call pcp Monday to set up followup, or call office sooner and speak with on call doctor if any new problems or questions;
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
## 2022-03-26 DIAGNOSIS — I12.9 BENIGN HYPERTENSION WITH CKD (CHRONIC KIDNEY DISEASE) STAGE III (HCC): ICD-10-CM

## 2022-03-26 DIAGNOSIS — M79.89 LEG SWELLING: ICD-10-CM

## 2022-03-26 DIAGNOSIS — N18.30 BENIGN HYPERTENSION WITH CKD (CHRONIC KIDNEY DISEASE) STAGE III (HCC): ICD-10-CM

## 2022-03-28 RX ORDER — FUROSEMIDE 20 MG/1
TABLET ORAL
Qty: 30 TABLET | Refills: 5 | Status: SHIPPED | OUTPATIENT
Start: 2022-03-28

## 2022-03-29 ENCOUNTER — HOSPITAL ENCOUNTER (EMERGENCY)
Facility: HOSPITAL | Age: 57
Discharge: HOME | End: 2022-03-29
Attending: EMERGENCY MEDICINE | Admitting: EMERGENCY MEDICINE
Payer: COMMERCIAL

## 2022-03-29 ENCOUNTER — APPOINTMENT (EMERGENCY)
Dept: RADIOLOGY | Facility: HOSPITAL | Age: 57
End: 2022-03-29
Payer: COMMERCIAL

## 2022-03-29 VITALS
WEIGHT: 207.23 LBS | DIASTOLIC BLOOD PRESSURE: 95 MMHG | SYSTOLIC BLOOD PRESSURE: 176 MMHG | HEART RATE: 58 BPM | OXYGEN SATURATION: 90 % | TEMPERATURE: 97.6 F | BODY MASS INDEX: 32.46 KG/M2 | RESPIRATION RATE: 22 BRPM

## 2022-03-29 DIAGNOSIS — I50.43 ACUTE ON CHRONIC COMBINED SYSTOLIC AND DIASTOLIC CONGESTIVE HEART FAILURE (HCC): Primary | ICD-10-CM

## 2022-03-29 DIAGNOSIS — R11.0 NAUSEA: ICD-10-CM

## 2022-03-29 DIAGNOSIS — I20.0 UNSTABLE ANGINA PECTORIS (HCC): ICD-10-CM

## 2022-03-29 LAB
2HR DELTA HS TROPONIN: -2 NG/L
ALBUMIN SERPL BCP-MCNC: 3.9 G/DL (ref 3.5–5)
ALP SERPL-CCNC: 61 U/L (ref 46–116)
ALT SERPL W P-5'-P-CCNC: 28 U/L (ref 12–78)
ANION GAP SERPL CALCULATED.3IONS-SCNC: 8 MMOL/L (ref 4–13)
AST SERPL W P-5'-P-CCNC: 29 U/L (ref 5–45)
BASOPHILS # BLD AUTO: 0.03 THOUSANDS/ΜL (ref 0–0.1)
BASOPHILS NFR BLD AUTO: 0 % (ref 0–1)
BILIRUB SERPL-MCNC: 0.59 MG/DL (ref 0.2–1)
BUN SERPL-MCNC: 26 MG/DL (ref 5–25)
CALCIUM SERPL-MCNC: 8.7 MG/DL (ref 8.3–10.1)
CARDIAC TROPONIN I PNL SERPL HS: 37 NG/L
CARDIAC TROPONIN I PNL SERPL HS: 39 NG/L
CHLORIDE SERPL-SCNC: 102 MMOL/L (ref 100–108)
CO2 SERPL-SCNC: 28 MMOL/L (ref 21–32)
CREAT SERPL-MCNC: 2.28 MG/DL (ref 0.6–1.3)
EOSINOPHIL # BLD AUTO: 0.02 THOUSAND/ΜL (ref 0–0.61)
EOSINOPHIL NFR BLD AUTO: 0 % (ref 0–6)
ERYTHROCYTE [DISTWIDTH] IN BLOOD BY AUTOMATED COUNT: 14.6 % (ref 11.6–15.1)
GFR SERPL CREATININE-BSD FRML MDRD: 23 ML/MIN/1.73SQ M
GLUCOSE SERPL-MCNC: 109 MG/DL (ref 65–140)
HCT VFR BLD AUTO: 38.4 % (ref 34.8–46.1)
HGB BLD-MCNC: 11.8 G/DL (ref 11.5–15.4)
IMM GRANULOCYTES # BLD AUTO: 0.02 THOUSAND/UL (ref 0–0.2)
IMM GRANULOCYTES NFR BLD AUTO: 0 % (ref 0–2)
LYMPHOCYTES # BLD AUTO: 1.53 THOUSANDS/ΜL (ref 0.6–4.47)
LYMPHOCYTES NFR BLD AUTO: 21 % (ref 14–44)
MCH RBC QN AUTO: 28.1 PG (ref 26.8–34.3)
MCHC RBC AUTO-ENTMCNC: 30.7 G/DL (ref 31.4–37.4)
MCV RBC AUTO: 91 FL (ref 82–98)
MONOCYTES # BLD AUTO: 0.45 THOUSAND/ΜL (ref 0.17–1.22)
MONOCYTES NFR BLD AUTO: 6 % (ref 4–12)
NEUTROPHILS # BLD AUTO: 5.41 THOUSANDS/ΜL (ref 1.85–7.62)
NEUTS SEG NFR BLD AUTO: 73 % (ref 43–75)
NRBC BLD AUTO-RTO: 0 /100 WBCS
NT-PROBNP SERPL-MCNC: 8325 PG/ML
PLATELET # BLD AUTO: 145 THOUSANDS/UL (ref 149–390)
PMV BLD AUTO: 11.7 FL (ref 8.9–12.7)
POTASSIUM SERPL-SCNC: 4.6 MMOL/L (ref 3.5–5.3)
PROT SERPL-MCNC: 7.9 G/DL (ref 6.4–8.2)
RBC # BLD AUTO: 4.2 MILLION/UL (ref 3.81–5.12)
SODIUM SERPL-SCNC: 138 MMOL/L (ref 136–145)
WBC # BLD AUTO: 7.46 THOUSAND/UL (ref 4.31–10.16)

## 2022-03-29 PROCEDURE — 99285 EMERGENCY DEPT VISIT HI MDM: CPT | Performed by: EMERGENCY MEDICINE

## 2022-03-29 PROCEDURE — 99285 EMERGENCY DEPT VISIT HI MDM: CPT

## 2022-03-29 PROCEDURE — 93005 ELECTROCARDIOGRAM TRACING: CPT

## 2022-03-29 PROCEDURE — 83880 ASSAY OF NATRIURETIC PEPTIDE: CPT | Performed by: EMERGENCY MEDICINE

## 2022-03-29 PROCEDURE — 84484 ASSAY OF TROPONIN QUANT: CPT

## 2022-03-29 PROCEDURE — 71045 X-RAY EXAM CHEST 1 VIEW: CPT

## 2022-03-29 PROCEDURE — 36415 COLL VENOUS BLD VENIPUNCTURE: CPT

## 2022-03-29 PROCEDURE — 80053 COMPREHEN METABOLIC PANEL: CPT

## 2022-03-29 PROCEDURE — 85025 COMPLETE CBC W/AUTO DIFF WBC: CPT

## 2022-03-29 RX ORDER — ONDANSETRON 4 MG/1
4 TABLET, ORALLY DISINTEGRATING ORAL ONCE
Status: COMPLETED | OUTPATIENT
Start: 2022-03-29 | End: 2022-03-29

## 2022-03-29 RX ORDER — TRAMADOL HYDROCHLORIDE 50 MG/1
50 TABLET ORAL ONCE
Status: COMPLETED | OUTPATIENT
Start: 2022-03-29 | End: 2022-03-29

## 2022-03-29 RX ORDER — FUROSEMIDE 20 MG/1
20 TABLET ORAL DAILY
Qty: 30 TABLET | Refills: 0 | Status: SHIPPED | OUTPATIENT
Start: 2022-03-29 | End: 2022-04-09 | Stop reason: HOSPADM

## 2022-03-29 RX ORDER — ONDANSETRON 4 MG/1
4 TABLET, ORALLY DISINTEGRATING ORAL EVERY 8 HOURS PRN
Qty: 15 TABLET | Refills: 0 | Status: SHIPPED | OUTPATIENT
Start: 2022-03-29 | End: 2022-06-17

## 2022-03-29 RX ADMIN — TRAMADOL HYDROCHLORIDE 50 MG: 50 TABLET, COATED ORAL at 16:36

## 2022-03-29 RX ADMIN — ONDANSETRON 4 MG: 4 TABLET, ORALLY DISINTEGRATING ORAL at 16:23
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
## 2022-03-30 LAB
ATRIAL RATE: 62 BPM
ATRIAL RATE: 62 BPM
P AXIS: 58 DEGREES
P AXIS: 64 DEGREES
PR INTERVAL: 200 MS
PR INTERVAL: 206 MS
QRS AXIS: -46 DEGREES
QRS AXIS: -59 DEGREES
QRSD INTERVAL: 170 MS
QRSD INTERVAL: 176 MS
QT INTERVAL: 464 MS
QT INTERVAL: 468 MS
QTC INTERVAL: 460 MS
QTC INTERVAL: 464 MS
T WAVE AXIS: 91 DEGREES
T WAVE AXIS: 95 DEGREES
VENTRICULAR RATE: 59 BPM
VENTRICULAR RATE: 59 BPM

## 2022-03-30 PROCEDURE — 93010 ELECTROCARDIOGRAM REPORT: CPT | Performed by: INTERNAL MEDICINE

## 2022-04-07 ENCOUNTER — HOSPITAL ENCOUNTER (OUTPATIENT)
Facility: HOSPITAL | Age: 57
Setting detail: OBSERVATION
Discharge: HOME | End: 2022-04-09
Attending: EMERGENCY MEDICINE | Admitting: INTERNAL MEDICINE
Payer: COMMERCIAL

## 2022-04-07 ENCOUNTER — APPOINTMENT (EMERGENCY)
Dept: RADIOLOGY | Facility: HOSPITAL | Age: 57
End: 2022-04-07
Payer: COMMERCIAL

## 2022-04-07 DIAGNOSIS — E87.8 ELECTROLYTE ABNORMALITY: ICD-10-CM

## 2022-04-07 DIAGNOSIS — N18.32 STAGE 3B CHRONIC KIDNEY DISEASE (HCC): ICD-10-CM

## 2022-04-07 DIAGNOSIS — I50.43 ACUTE ON CHRONIC COMBINED SYSTOLIC AND DIASTOLIC CONGESTIVE HEART FAILURE (HCC): ICD-10-CM

## 2022-04-07 DIAGNOSIS — N17.9 AKI (ACUTE KIDNEY INJURY) (HCC): ICD-10-CM

## 2022-04-07 DIAGNOSIS — R07.9 CHEST PAIN: Primary | ICD-10-CM

## 2022-04-07 PROBLEM — I24.9 ACS (ACUTE CORONARY SYNDROME) (HCC): Status: RESOLVED | Noted: 2021-02-07 | Resolved: 2022-04-07

## 2022-04-07 LAB
2HR DELTA HS TROPONIN: -3 NG/L
4HR DELTA HS TROPONIN: 2 NG/L
AMPHETAMINES SERPL QL SCN: NEGATIVE
ANION GAP SERPL CALCULATED.3IONS-SCNC: 8 MMOL/L (ref 4–13)
ATRIAL RATE: 60 BPM
BACTERIA UR QL AUTO: NORMAL /HPF
BARBITURATES UR QL: NEGATIVE
BASOPHILS # BLD AUTO: 0.02 THOUSANDS/ΜL (ref 0–0.1)
BASOPHILS NFR BLD AUTO: 0 % (ref 0–1)
BENZODIAZ UR QL: NEGATIVE
BILIRUB UR QL STRIP: NEGATIVE
BUN SERPL-MCNC: 27 MG/DL (ref 5–25)
CALCIUM SERPL-MCNC: 9 MG/DL (ref 8.4–10.2)
CARDIAC TROPONIN I PNL SERPL HS: 50 NG/L
CARDIAC TROPONIN I PNL SERPL HS: 53 NG/L
CARDIAC TROPONIN I PNL SERPL HS: 55 NG/L
CHLORIDE SERPL-SCNC: 103 MMOL/L (ref 96–108)
CLARITY UR: CLEAR
CO2 SERPL-SCNC: 27 MMOL/L (ref 21–32)
COCAINE UR QL: POSITIVE
COLOR UR: YELLOW
CREAT SERPL-MCNC: 2.34 MG/DL (ref 0.6–1.3)
EOSINOPHIL # BLD AUTO: 0.03 THOUSAND/ΜL (ref 0–0.61)
EOSINOPHIL NFR BLD AUTO: 0 % (ref 0–6)
ERYTHROCYTE [DISTWIDTH] IN BLOOD BY AUTOMATED COUNT: 14.7 % (ref 11.6–15.1)
FLUAV RNA RESP QL NAA+PROBE: NEGATIVE
FLUBV RNA RESP QL NAA+PROBE: NEGATIVE
GFR SERPL CREATININE-BSD FRML MDRD: 22 ML/MIN/1.73SQ M
GLUCOSE SERPL-MCNC: 89 MG/DL (ref 65–140)
GLUCOSE UR STRIP-MCNC: NEGATIVE MG/DL
HCT VFR BLD AUTO: 34.4 % (ref 34.8–46.1)
HGB BLD-MCNC: 11.1 G/DL (ref 11.5–15.4)
HGB UR QL STRIP.AUTO: ABNORMAL
IMM GRANULOCYTES # BLD AUTO: 0.02 THOUSAND/UL (ref 0–0.2)
IMM GRANULOCYTES NFR BLD AUTO: 0 % (ref 0–2)
KETONES UR STRIP-MCNC: NEGATIVE MG/DL
LEUKOCYTE ESTERASE UR QL STRIP: NEGATIVE
LYMPHOCYTES # BLD AUTO: 1.15 THOUSANDS/ΜL (ref 0.6–4.47)
LYMPHOCYTES NFR BLD AUTO: 15 % (ref 14–44)
MCH RBC QN AUTO: 28 PG (ref 26.8–34.3)
MCHC RBC AUTO-ENTMCNC: 32.3 G/DL (ref 31.4–37.4)
MCV RBC AUTO: 87 FL (ref 82–98)
METHADONE UR QL: NEGATIVE
MONOCYTES # BLD AUTO: 0.46 THOUSAND/ΜL (ref 0.17–1.22)
MONOCYTES NFR BLD AUTO: 6 % (ref 4–12)
NEUTROPHILS # BLD AUTO: 5.83 THOUSANDS/ΜL (ref 1.85–7.62)
NEUTS SEG NFR BLD AUTO: 79 % (ref 43–75)
NITRITE UR QL STRIP: NEGATIVE
NON-SQ EPI CELLS URNS QL MICRO: NORMAL /HPF
NRBC BLD AUTO-RTO: 0 /100 WBCS
OPIATES UR QL SCN: NEGATIVE
OXYCODONE+OXYMORPHONE UR QL SCN: POSITIVE
PCP UR QL: NEGATIVE
PH UR STRIP.AUTO: 7 [PH]
PLATELET # BLD AUTO: 186 THOUSANDS/UL (ref 149–390)
PMV BLD AUTO: 10.7 FL (ref 8.9–12.7)
POTASSIUM SERPL-SCNC: 4.1 MMOL/L (ref 3.5–5.3)
PR INTERVAL: 136 MS
PROT UR STRIP-MCNC: ABNORMAL MG/DL
QRS AXIS: -58 DEGREES
QRSD INTERVAL: 152 MS
QT INTERVAL: 451 MS
QTC INTERVAL: 484 MS
RBC # BLD AUTO: 3.97 MILLION/UL (ref 3.81–5.12)
RBC #/AREA URNS AUTO: NORMAL /HPF
RSV RNA RESP QL NAA+PROBE: NEGATIVE
SARS-COV-2 RNA RESP QL NAA+PROBE: NEGATIVE
SODIUM SERPL-SCNC: 138 MMOL/L (ref 135–147)
SP GR UR STRIP.AUTO: 1.01 (ref 1–1.03)
T WAVE AXIS: 92 DEGREES
THC UR QL: NEGATIVE
TSH SERPL DL<=0.05 MIU/L-ACNC: 3.57 UIU/ML (ref 0.45–4.5)
UROBILINOGEN UR QL STRIP.AUTO: 0.2 E.U./DL
VENTRICULAR RATE: 69 BPM
WBC # BLD AUTO: 7.51 THOUSAND/UL (ref 4.31–10.16)
WBC #/AREA URNS AUTO: NORMAL /HPF

## 2022-04-07 PROCEDURE — 36415 COLL VENOUS BLD VENIPUNCTURE: CPT | Performed by: EMERGENCY MEDICINE

## 2022-04-07 PROCEDURE — 85025 COMPLETE CBC W/AUTO DIFF WBC: CPT | Performed by: EMERGENCY MEDICINE

## 2022-04-07 PROCEDURE — 93010 ELECTROCARDIOGRAM REPORT: CPT | Performed by: INTERNAL MEDICINE

## 2022-04-07 PROCEDURE — 84484 ASSAY OF TROPONIN QUANT: CPT | Performed by: EMERGENCY MEDICINE

## 2022-04-07 PROCEDURE — 81001 URINALYSIS AUTO W/SCOPE: CPT | Performed by: INTERNAL MEDICINE

## 2022-04-07 PROCEDURE — 0241U HB NFCT DS VIR RESP RNA 4 TRGT: CPT | Performed by: EMERGENCY MEDICINE

## 2022-04-07 PROCEDURE — 99285 EMERGENCY DEPT VISIT HI MDM: CPT

## 2022-04-07 PROCEDURE — 84484 ASSAY OF TROPONIN QUANT: CPT | Performed by: INTERNAL MEDICINE

## 2022-04-07 PROCEDURE — 93005 ELECTROCARDIOGRAM TRACING: CPT

## 2022-04-07 PROCEDURE — 84443 ASSAY THYROID STIM HORMONE: CPT | Performed by: EMERGENCY MEDICINE

## 2022-04-07 PROCEDURE — 83880 ASSAY OF NATRIURETIC PEPTIDE: CPT | Performed by: EMERGENCY MEDICINE

## 2022-04-07 PROCEDURE — C9113 INJ PANTOPRAZOLE SODIUM, VIA: HCPCS | Performed by: INTERNAL MEDICINE

## 2022-04-07 PROCEDURE — 80048 BASIC METABOLIC PNL TOTAL CA: CPT | Performed by: EMERGENCY MEDICINE

## 2022-04-07 PROCEDURE — 99220 PR INITIAL OBSERVATION CARE/DAY 70 MINUTES: CPT | Performed by: INTERNAL MEDICINE

## 2022-04-07 PROCEDURE — 99285 EMERGENCY DEPT VISIT HI MDM: CPT | Performed by: EMERGENCY MEDICINE

## 2022-04-07 PROCEDURE — 80307 DRUG TEST PRSMV CHEM ANLYZR: CPT | Performed by: INTERNAL MEDICINE

## 2022-04-07 PROCEDURE — 71045 X-RAY EXAM CHEST 1 VIEW: CPT

## 2022-04-07 RX ORDER — PANTOPRAZOLE SODIUM 40 MG/1
40 TABLET, DELAYED RELEASE ORAL DAILY
Status: DISCONTINUED | OUTPATIENT
Start: 2022-04-07 | End: 2022-04-07

## 2022-04-07 RX ORDER — AMIODARONE HYDROCHLORIDE 200 MG/1
200 TABLET ORAL DAILY
Status: DISCONTINUED | OUTPATIENT
Start: 2022-04-08 | End: 2022-04-09 | Stop reason: HOSPADM

## 2022-04-07 RX ORDER — AMIODARONE HYDROCHLORIDE 200 MG/1
200 TABLET ORAL DAILY
Status: DISCONTINUED | OUTPATIENT
Start: 2022-04-07 | End: 2022-04-07

## 2022-04-07 RX ORDER — NITROGLYCERIN 0.4 MG/1
0.4 TABLET SUBLINGUAL
Status: DISCONTINUED | OUTPATIENT
Start: 2022-04-07 | End: 2022-04-09 | Stop reason: HOSPADM

## 2022-04-07 RX ORDER — SODIUM CHLORIDE 9 MG/ML
3 INJECTION INTRAVENOUS
Status: DISCONTINUED | OUTPATIENT
Start: 2022-04-07 | End: 2022-04-09 | Stop reason: HOSPADM

## 2022-04-07 RX ORDER — PANTOPRAZOLE SODIUM 40 MG/1
40 INJECTION, POWDER, FOR SOLUTION INTRAVENOUS
Status: DISCONTINUED | OUTPATIENT
Start: 2022-04-07 | End: 2022-04-09 | Stop reason: HOSPADM

## 2022-04-07 RX ORDER — OXYCODONE HYDROCHLORIDE 5 MG/1
2.5 TABLET ORAL ONCE
Status: COMPLETED | OUTPATIENT
Start: 2022-04-07 | End: 2022-04-07

## 2022-04-07 RX ORDER — SENNOSIDES 8.6 MG
1 TABLET ORAL DAILY
Status: DISCONTINUED | OUTPATIENT
Start: 2022-04-08 | End: 2022-04-09 | Stop reason: HOSPADM

## 2022-04-07 RX ORDER — FUROSEMIDE 10 MG/ML
20 INJECTION INTRAMUSCULAR; INTRAVENOUS 2 TIMES DAILY
Status: DISCONTINUED | OUTPATIENT
Start: 2022-04-07 | End: 2022-04-07

## 2022-04-07 RX ORDER — FUROSEMIDE 10 MG/ML
40 INJECTION INTRAMUSCULAR; INTRAVENOUS 2 TIMES DAILY
Status: COMPLETED | OUTPATIENT
Start: 2022-04-08 | End: 2022-04-08

## 2022-04-07 RX ORDER — ACETAMINOPHEN 325 MG/1
650 TABLET ORAL EVERY 6 HOURS PRN
Status: DISCONTINUED | OUTPATIENT
Start: 2022-04-07 | End: 2022-04-09 | Stop reason: HOSPADM

## 2022-04-07 RX ORDER — ONDANSETRON 2 MG/ML
4 INJECTION INTRAMUSCULAR; INTRAVENOUS EVERY 6 HOURS PRN
Status: DISCONTINUED | OUTPATIENT
Start: 2022-04-07 | End: 2022-04-09 | Stop reason: HOSPADM

## 2022-04-07 RX ORDER — FUROSEMIDE 10 MG/ML
40 INJECTION INTRAMUSCULAR; INTRAVENOUS 2 TIMES DAILY
Status: DISCONTINUED | OUTPATIENT
Start: 2022-04-07 | End: 2022-04-07

## 2022-04-07 RX ORDER — ACETAMINOPHEN 325 MG/1
975 TABLET ORAL ONCE
Status: COMPLETED | OUTPATIENT
Start: 2022-04-07 | End: 2022-04-07

## 2022-04-07 RX ORDER — HYDRALAZINE HYDROCHLORIDE 20 MG/ML
5 INJECTION INTRAMUSCULAR; INTRAVENOUS EVERY 4 HOURS PRN
Status: DISCONTINUED | OUTPATIENT
Start: 2022-04-07 | End: 2022-04-08

## 2022-04-07 RX ORDER — BISACODYL 10 MG
10 SUPPOSITORY, RECTAL RECTAL DAILY PRN
Status: DISCONTINUED | OUTPATIENT
Start: 2022-04-07 | End: 2022-04-09 | Stop reason: HOSPADM

## 2022-04-07 RX ORDER — HYDROMORPHONE HCL IN WATER/PF 6 MG/30 ML
0.2 PATIENT CONTROLLED ANALGESIA SYRINGE INTRAVENOUS ONCE
Status: DISCONTINUED | OUTPATIENT
Start: 2022-04-07 | End: 2022-04-07

## 2022-04-07 RX ADMIN — ACETAMINOPHEN 650 MG: 325 TABLET ORAL at 17:38

## 2022-04-07 RX ADMIN — RIVAROXABAN 15 MG: 15 TABLET, FILM COATED ORAL at 17:39

## 2022-04-07 RX ADMIN — METOPROLOL SUCCINATE 150 MG: 100 TABLET, EXTENDED RELEASE ORAL at 17:39

## 2022-04-07 RX ADMIN — ACETAMINOPHEN 975 MG: 325 TABLET ORAL at 13:54

## 2022-04-07 RX ADMIN — OXYCODONE HYDROCHLORIDE 2.5 MG: 5 TABLET ORAL at 18:50

## 2022-04-07 RX ADMIN — PANTOPRAZOLE SODIUM 40 MG: 40 INJECTION, POWDER, FOR SOLUTION INTRAVENOUS at 17:39

## 2022-04-07 RX ADMIN — HYDRALAZINE HYDROCHLORIDE 5 MG: 20 INJECTION INTRAMUSCULAR; INTRAVENOUS at 20:07

## 2022-04-07 RX ADMIN — NICOTINE 1 PATCH: 7 PATCH, EXTENDED RELEASE TRANSDERMAL at 17:39

## 2022-04-07 RX ADMIN — NITROGLYCERIN 1 INCH: 20 OINTMENT TOPICAL at 22:03

## 2022-04-07 RX ADMIN — ACETAMINOPHEN 650 MG: 325 TABLET ORAL at 23:54

## 2022-04-07 NOTE — ASSESSMENT & PLAN NOTE
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  She describes 7/10  substernal chest pain that feels "like someone is pressing on my chest"  The pain was partially relieved when she sat down but has been constant since this morning  SOB after climbing one step for the past few days  Patient does note legs are heavy when I walk"  Denies lower extremity edema  Wells Score for PE 0 on admission    Troponins elevated to 53 on presentation, likely non MI  No ST elevations on initial EKG; EKG shows current atrial fibrillation  CXR showed mild pulmonary vascular congestion    Continue to trend troponins  Oxygen 2L/min nasal cannula, titrate to achieve O2 saturation higher than 92%  40 Lasix BID  Continue home meds: metoprolol, Xarelto, amiodarone  Consult cardiology, will order echocardiogram  Continue to monitor on telemetry

## 2022-04-07 NOTE — ASSESSMENT & PLAN NOTE
Initial troponin was 53, continue to trend  Likely secondary to CHF exacerbation  Cardiology was consulted, appreciate recommendations
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
## 2022-04-07 NOTE — PROGRESS NOTES
Pts significant other requested something more for pts headache  This writer told him we just gave her tylenol, that was prescribed by MD and have no other medications at this time  SLIM updated    Will await further ordres  Call bell in hand  [facility]

## 2022-04-07 NOTE — ASSESSMENT & PLAN NOTE
Lab Results   Component Value Date    EGFR 22 04/07/2022    EGFR 23 03/29/2022    EGFR 32 02/02/2022    CREATININE 2 34 (H) 04/07/2022    CREATININE 2 28 (H) 03/29/2022    CREATININE 1 74 (H) 02/02/2022     Cr 2 34 on presentation; baseline Cr 1 7, likely cardiorenal  Pt reports nausea and vomiting with poor PO intake for the last month 2/2 to sensation of food "getting stuck half-way down", however does not look dehydrated on physical exam  Start Lasix 40 mg IV b i d , monitor kidney function  Daily weights, strict I&O

## 2022-04-07 NOTE — PLAN OF CARE
Problem: PAIN - ADULT  Goal: Verbalizes/displays adequate comfort level or baseline comfort level  Description: Interventions:  - Encourage patient to monitor pain and request assistance  - Assess pain using appropriate pain scale  - Administer analgesics based on type and severity of pain and evaluate response  - Implement non-pharmacological measures as appropriate and evaluate response  - Consider cultural and social influences on pain and pain management  - Notify physician/advanced practitioner if interventions unsuccessful or patient reports new pain  Outcome: Progressing     Problem: INFECTION - ADULT  Goal: Absence or prevention of progression during hospitalization  Description: INTERVENTIONS:  - Assess and monitor for signs and symptoms of infection  - Monitor lab/diagnostic results  - Monitor all insertion sites, i e  indwelling lines, tubes, and drains  - Monitor endotracheal if appropriate and nasal secretions for changes in amount and color  - [place] appropriate cooling/warming therapies per order  - Administer medications as ordered  - Instruct and encourage patient and family to use good hand hygiene technique  - Identify and instruct in appropriate isolation precautions for identified infection/condition  Outcome: Progressing  Goal: Absence of fever/infection during neutropenic period  Description: INTERVENTIONS:  - Monitor WBC    Outcome: Progressing     Problem: SAFETY ADULT  Goal: Patient will remain free of falls  Description: INTERVENTIONS:  - Educate patient/family on patient safety including physical limitations  - Instruct patient to call for assistance with activity   - Consult OT/PT to assist with strengthening/mobility   - Keep Call bell within reach  - Keep bed low and locked with side rails adjusted as appropriate  - Keep care items and personal belongings within reach  - Initiate and maintain comfort rounds  - Make Fall Risk Sign visible to staff  - Offer Toileting   - Apply yellow socks and bracelet for high fall risk patients  - Consider moving patient to room near nurses station  Outcome: Progressing  Goal: Maintain or return to baseline ADL function  Description: INTERVENTIONS:  -  Assess patient's ability to carry out ADLs; assess patient's baseline for ADL function and identify physical deficits which impact ability to perform ADLs (bathing, care of mouth/teeth, toileting, grooming, dressing, etc )  - Assess/evaluate cause of self-care deficits   - Assess range of motion  - Assess patient's mobility; develop plan if impaired  - Assess patient's need for assistive devices and provide as appropriate  - Encourage maximum independence but intervene and supervise when necessary  - Involve family in performance of ADLs  - Assess for home care needs following discharge   - Consider OT consult to assist with ADL evaluation and planning for discharge  - Provide patient education as appropriate  Outcome: Progressing  Goal: Maintains/Returns to pre admission functional level  Description: INTERVENTIONS:  - Perform BMAT or MOVE assessment daily    - Set and communicate daily mobility goal to care team and patient/family/caregiver  - Collaborate with rehabilitation services on mobility goals if consulted    - Reposition patient every 2 hours      - Out of bed for meals 3 times a day  - Out of bed for toileting  - Record patient progress and toleration of activity level   Outcome: Progressing     Problem: DISCHARGE PLANNING  Goal: Discharge to home or other facility with appropriate resources  Description: INTERVENTIONS:  - Identify barriers to discharge w/patient and caregiver  - Arrange for needed discharge resources and transportation as appropriate  - Identify discharge learning needs (meds, wound care, etc )  - Arrange for interpretive services to assist at discharge as needed  - Refer to Case Management Department for coordinating discharge planning if the patient needs post-hospital services based on physician/advanced practitioner order or complex needs related to functional status, cognitive ability, or social support system  Outcome: Progressing     Problem: Knowledge Deficit  Goal: Patient/family/caregiver demonstrates understanding of disease process, treatment plan, medications, and discharge instructions  Description: Complete learning assessment and assess knowledge base    Interventions:  - Provide teaching at level of understanding  - Provide teaching via preferred learning methods  Outcome: Progressing     Problem: CARDIOVASCULAR - ADULT  Goal: Maintains optimal cardiac output and hemodynamic stability  Description: INTERVENTIONS:  - Monitor I/O, vital signs and rhythm  - Monitor for S/S and trends of decreased cardiac output  - Administer and titrate ordered vasoactive medications to optimize hemodynamic stability  - Assess quality of pulses, skin color and temperature  - Assess for signs of decreased coronary artery perfusion  - Instruct patient to report change in severity of symptoms  Outcome: Progressing  Goal: Absence of cardiac dysrhythmias or at baseline rhythm  Description: INTERVENTIONS:  - Continuous cardiac monitoring, vital signs, obtain 12 lead EKG if ordered  - Administer antiarrhythmic and heart rate control medications as ordered  - Monitor electrolytes and administer replacement therapy as ordered  Outcome: Progressing     Problem: METABOLIC, FLUID AND ELECTROLYTES - ADULT  Goal: Electrolytes maintained within normal limits  Description: INTERVENTIONS:  - Monitor labs and assess patient for signs and symptoms of electrolyte imbalances  - Administer electrolyte replacement as ordered  - Monitor response to electrolyte replacements, including repeat lab results as appropriate  - Instruct patient on fluid and nutrition as appropriate  Outcome: Progressing  Goal: Fluid balance maintained  Description: INTERVENTIONS:  - Monitor labs   - Monitor I/O and WT  - Instruct patient on fluid and nutrition as appropriate  - Assess for signs & symptoms of volume excess or deficit  Outcome: Progressing     Problem: HEMATOLOGIC - ADULT  Goal: Maintains hematologic stability  Description: INTERVENTIONS  - Assess for signs and symptoms of bleeding or hemorrhage  - Monitor labs  - Administer supportive blood products/factors as ordered and appropriate  Outcome: Progressing
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
## 2022-04-07 NOTE — PROGRESS NOTES
Pt arrived to unit, during assessment and vital signs, pt had complaints of increased SOB requiring 02, pt was having visible signs of SOB her 02 was advanced to 4L, vital signs obtained, ekg obtained, MD made aware, pt had very high BP automatic 214/118, manual obtained pt 190/90 left arm lying  Pt still has sob but is more comfortable  MD will write orders for medications to control bp    Call bell in [facility]

## 2022-04-07 NOTE — ASSESSMENT & PLAN NOTE
Patient is currently on AFib, continue anticoagulation with Xarelto  Continue home dose beta-blocker  Continue amiodarone for now

## 2022-04-08 ENCOUNTER — APPOINTMENT (OUTPATIENT)
Dept: NON INVASIVE DIAGNOSTICS | Facility: HOSPITAL | Age: 57
End: 2022-04-08
Payer: COMMERCIAL

## 2022-04-08 PROBLEM — I16.9 HYPERTENSIVE CRISIS: Status: ACTIVE | Noted: 2022-04-08

## 2022-04-08 PROBLEM — E87.8 ELECTROLYTE ABNORMALITY: Status: ACTIVE | Noted: 2022-04-08

## 2022-04-08 LAB
ALBUMIN SERPL BCP-MCNC: 3.5 G/DL (ref 3.5–5)
ALP SERPL-CCNC: 43 U/L (ref 34–104)
ALT SERPL W P-5'-P-CCNC: 10 U/L (ref 7–52)
ANION GAP SERPL CALCULATED.3IONS-SCNC: 8 MMOL/L (ref 4–13)
AST SERPL W P-5'-P-CCNC: 14 U/L (ref 13–39)
BASOPHILS # BLD AUTO: 0.02 THOUSANDS/ΜL (ref 0–0.1)
BASOPHILS NFR BLD AUTO: 0 % (ref 0–1)
BILIRUB SERPL-MCNC: 0.76 MG/DL (ref 0.2–1)
BILIRUB UR QL STRIP: NEGATIVE
BUN SERPL-MCNC: 21 MG/DL (ref 5–25)
CALCIUM SERPL-MCNC: 8.5 MG/DL (ref 8.4–10.2)
CARDIAC TROPONIN I PNL SERPL HS: 57 NG/L (ref 8–18)
CHLORIDE SERPL-SCNC: 103 MMOL/L (ref 96–108)
CLARITY UR: ABNORMAL
CO2 SERPL-SCNC: 27 MMOL/L (ref 21–32)
COLOR UR: YELLOW
CREAT SERPL-MCNC: 1.95 MG/DL (ref 0.6–1.3)
EOSINOPHIL # BLD AUTO: 0.04 THOUSAND/ΜL (ref 0–0.61)
EOSINOPHIL NFR BLD AUTO: 1 % (ref 0–6)
ERYTHROCYTE [DISTWIDTH] IN BLOOD BY AUTOMATED COUNT: 14.8 % (ref 11.6–15.1)
GFR SERPL CREATININE-BSD FRML MDRD: 27 ML/MIN/1.73SQ M
GLUCOSE SERPL-MCNC: 96 MG/DL (ref 65–140)
GLUCOSE UR STRIP-MCNC: NEGATIVE MG/DL
HCT VFR BLD AUTO: 33.5 % (ref 34.8–46.1)
HGB BLD-MCNC: 10.7 G/DL (ref 11.5–15.4)
HGB UR QL STRIP.AUTO: NEGATIVE
IMM GRANULOCYTES # BLD AUTO: 0.01 THOUSAND/UL (ref 0–0.2)
IMM GRANULOCYTES NFR BLD AUTO: 0 % (ref 0–2)
KETONES UR STRIP-MCNC: NEGATIVE MG/DL
LEUKOCYTE ESTERASE UR QL STRIP: NEGATIVE
LYMPHOCYTES # BLD AUTO: 1.41 THOUSANDS/ΜL (ref 0.6–4.47)
LYMPHOCYTES NFR BLD AUTO: 23 % (ref 14–44)
MAGNESIUM SERPL-MCNC: 1.8 MG/DL (ref 1.9–2.7)
MCH RBC QN AUTO: 27.9 PG (ref 26.8–34.3)
MCHC RBC AUTO-ENTMCNC: 31.9 G/DL (ref 31.4–37.4)
MCV RBC AUTO: 87 FL (ref 82–98)
MONOCYTES # BLD AUTO: 0.47 THOUSAND/ΜL (ref 0.17–1.22)
MONOCYTES NFR BLD AUTO: 8 % (ref 4–12)
NEUTROPHILS # BLD AUTO: 4.31 THOUSANDS/ΜL (ref 1.85–7.62)
NEUTS SEG NFR BLD AUTO: 68 % (ref 43–75)
NITRITE UR QL STRIP: NEGATIVE
NRBC BLD AUTO-RTO: 0 /100 WBCS
NT-PROBNP SERPL-MCNC: ABNORMAL PG/ML
PH UR STRIP.AUTO: 7 [PH]
PHOSPHATE SERPL-MCNC: 2.9 MG/DL (ref 2.7–4.5)
PLATELET # BLD AUTO: 156 THOUSANDS/UL (ref 149–390)
PMV BLD AUTO: 11 FL (ref 8.9–12.7)
POTASSIUM SERPL-SCNC: 3.3 MMOL/L (ref 3.5–5.3)
PROT SERPL-MCNC: 6.5 G/DL (ref 6.4–8.4)
PROT UR STRIP-MCNC: NEGATIVE MG/DL
RBC # BLD AUTO: 3.84 MILLION/UL (ref 3.81–5.12)
SODIUM SERPL-SCNC: 138 MMOL/L (ref 135–147)
SP GR UR STRIP.AUTO: 1.01 (ref 1–1.03)
UROBILINOGEN UR QL STRIP.AUTO: 0.2 E.U./DL
WBC # BLD AUTO: 6.26 THOUSAND/UL (ref 4.31–10.16)

## 2022-04-08 PROCEDURE — 99215 OFFICE O/P EST HI 40 MIN: CPT | Performed by: INTERNAL MEDICINE

## 2022-04-08 PROCEDURE — 84100 ASSAY OF PHOSPHORUS: CPT | Performed by: INTERNAL MEDICINE

## 2022-04-08 PROCEDURE — 83735 ASSAY OF MAGNESIUM: CPT | Performed by: INTERNAL MEDICINE

## 2022-04-08 PROCEDURE — C9113 INJ PANTOPRAZOLE SODIUM, VIA: HCPCS | Performed by: INTERNAL MEDICINE

## 2022-04-08 PROCEDURE — 99225 PR SBSQ OBSERVATION CARE/DAY 25 MINUTES: CPT | Performed by: INTERNAL MEDICINE

## 2022-04-08 PROCEDURE — 84484 ASSAY OF TROPONIN QUANT: CPT

## 2022-04-08 PROCEDURE — 93005 ELECTROCARDIOGRAM TRACING: CPT

## 2022-04-08 PROCEDURE — 81003 URINALYSIS AUTO W/O SCOPE: CPT

## 2022-04-08 PROCEDURE — 80053 COMPREHEN METABOLIC PANEL: CPT | Performed by: INTERNAL MEDICINE

## 2022-04-08 PROCEDURE — 85025 COMPLETE CBC W/AUTO DIFF WBC: CPT | Performed by: INTERNAL MEDICINE

## 2022-04-08 RX ORDER — AMLODIPINE BESYLATE 5 MG/1
5 TABLET ORAL ONCE
Status: COMPLETED | OUTPATIENT
Start: 2022-04-08 | End: 2022-04-08

## 2022-04-08 RX ORDER — POTASSIUM CHLORIDE 14.9 MG/ML
20 INJECTION INTRAVENOUS ONCE
Status: COMPLETED | OUTPATIENT
Start: 2022-04-08 | End: 2022-04-08

## 2022-04-08 RX ORDER — MAGNESIUM SULFATE HEPTAHYDRATE 40 MG/ML
2 INJECTION, SOLUTION INTRAVENOUS ONCE
Status: COMPLETED | OUTPATIENT
Start: 2022-04-08 | End: 2022-04-09

## 2022-04-08 RX ORDER — LORAZEPAM 0.5 MG/1
0.5 TABLET ORAL EVERY 6 HOURS PRN
Status: DISCONTINUED | OUTPATIENT
Start: 2022-04-08 | End: 2022-04-09 | Stop reason: HOSPADM

## 2022-04-08 RX ORDER — POTASSIUM CHLORIDE 14.9 MG/ML
20 INJECTION INTRAVENOUS ONCE
Status: DISCONTINUED | OUTPATIENT
Start: 2022-04-08 | End: 2022-04-09 | Stop reason: HOSPADM

## 2022-04-08 RX ORDER — AMLODIPINE BESYLATE 5 MG/1
5 TABLET ORAL DAILY
Status: DISCONTINUED | OUTPATIENT
Start: 2022-04-08 | End: 2022-04-09 | Stop reason: HOSPADM

## 2022-04-08 RX ORDER — LORAZEPAM 2 MG/ML
0.5 INJECTION INTRAMUSCULAR ONCE
Status: COMPLETED | OUTPATIENT
Start: 2022-04-08 | End: 2022-04-08

## 2022-04-08 RX ORDER — OXYCODONE HYDROCHLORIDE 5 MG/1
5 TABLET ORAL EVERY 6 HOURS PRN
Status: DISCONTINUED | OUTPATIENT
Start: 2022-04-08 | End: 2022-04-09 | Stop reason: HOSPADM

## 2022-04-08 RX ORDER — POTASSIUM CHLORIDE 20 MEQ/1
20 TABLET, EXTENDED RELEASE ORAL 2 TIMES DAILY
Status: DISCONTINUED | OUTPATIENT
Start: 2022-04-08 | End: 2022-04-09 | Stop reason: HOSPADM

## 2022-04-08 RX ORDER — HYDRALAZINE HYDROCHLORIDE 20 MG/ML
10 INJECTION INTRAMUSCULAR; INTRAVENOUS EVERY 4 HOURS PRN
Status: DISCONTINUED | OUTPATIENT
Start: 2022-04-08 | End: 2022-04-09 | Stop reason: HOSPADM

## 2022-04-08 RX ORDER — FUROSEMIDE 20 MG/1
20 TABLET ORAL DAILY
Status: DISCONTINUED | OUTPATIENT
Start: 2022-04-09 | End: 2022-04-09 | Stop reason: HOSPADM

## 2022-04-08 RX ADMIN — METOPROLOL SUCCINATE 150 MG: 100 TABLET, EXTENDED RELEASE ORAL at 17:03

## 2022-04-08 RX ADMIN — SACUBITRIL AND VALSARTAN 1 TABLET: 49; 51 TABLET, FILM COATED ORAL at 11:37

## 2022-04-08 RX ADMIN — METOPROLOL SUCCINATE 150 MG: 100 TABLET, EXTENDED RELEASE ORAL at 08:57

## 2022-04-08 RX ADMIN — ACETAMINOPHEN 650 MG: 325 TABLET ORAL at 20:00

## 2022-04-08 RX ADMIN — LORAZEPAM 0.5 MG: 0.5 TABLET ORAL at 17:03

## 2022-04-08 RX ADMIN — RIVAROXABAN 15 MG: 15 TABLET, FILM COATED ORAL at 17:15

## 2022-04-08 RX ADMIN — OXYCODONE HYDROCHLORIDE 5 MG: 5 TABLET ORAL at 17:15

## 2022-04-08 RX ADMIN — AMIODARONE HYDROCHLORIDE 200 MG: 200 TABLET ORAL at 08:57

## 2022-04-08 RX ADMIN — POTASSIUM CHLORIDE 20 MEQ: 1500 TABLET, EXTENDED RELEASE ORAL at 09:40

## 2022-04-08 RX ADMIN — MAGNESIUM SULFATE HEPTAHYDRATE 2 G: 40 INJECTION, SOLUTION INTRAVENOUS at 10:15

## 2022-04-08 RX ADMIN — AMLODIPINE BESYLATE 5 MG: 5 TABLET ORAL at 08:57

## 2022-04-08 RX ADMIN — SACUBITRIL AND VALSARTAN 1 TABLET: 49; 51 TABLET, FILM COATED ORAL at 17:18

## 2022-04-08 RX ADMIN — POTASSIUM CHLORIDE 20 MEQ: 1500 TABLET, EXTENDED RELEASE ORAL at 17:15

## 2022-04-08 RX ADMIN — PANTOPRAZOLE SODIUM 40 MG: 40 INJECTION, POWDER, FOR SOLUTION INTRAVENOUS at 08:57

## 2022-04-08 RX ADMIN — FUROSEMIDE 40 MG: 10 INJECTION, SOLUTION INTRAMUSCULAR; INTRAVENOUS at 08:57

## 2022-04-08 RX ADMIN — SENNOSIDES 8.6 MG: 8.6 TABLET, FILM COATED ORAL at 08:57

## 2022-04-08 RX ADMIN — POTASSIUM CHLORIDE 20 MEQ: 14.9 INJECTION, SOLUTION INTRAVENOUS at 09:24

## 2022-04-08 RX ADMIN — AMLODIPINE BESYLATE 5 MG: 5 TABLET ORAL at 09:40

## 2022-04-08 RX ADMIN — FUROSEMIDE 40 MG: 10 INJECTION, SOLUTION INTRAMUSCULAR; INTRAVENOUS at 17:02

## 2022-04-08 RX ADMIN — NICOTINE 1 PATCH: 7 PATCH, EXTENDED RELEASE TRANSDERMAL at 09:00

## 2022-04-08 RX ADMIN — LORAZEPAM 0.5 MG: 2 INJECTION INTRAMUSCULAR; INTRAVENOUS at 11:11

## 2022-04-08 RX ADMIN — OXYCODONE HYDROCHLORIDE 5 MG: 5 TABLET ORAL at 09:40

## 2022-04-08 NOTE — PROGRESS NOTES
Preethi U  66   Progress Note - Neville Mccarty 1965, 62 y o  female MRN: [id number]  Unit/Bed#: -Keisha Encounter: [id number]  Primary Care Provider: Juana Flynn MD   Date and time admitted to hospital: 4/7/2022 12:28 PM    Electrolyte abnormality  Assessment & Plan  Likely 2/2 to poor PO intake, vomiting, and changes in diuretic dose    Replete as needed    Hypertensive crisis  Assessment & Plan  Episodes of hypertension with Bps 190s/100s  Likely precipitated by cocaine use w/ cardiac hx  Complaining of headaches w/o vision changes, nausea/vomiting  One dose hydralazine 4/7 2000    /100 on repeat measurement this am (4/8) before administration of meds    D/c on metoprolol, Entresto, amlodipine, Lasix      Cocaine abuse (HCC)  Assessment & Plan  History of cocaine abuse, however patient denies any recent consumption  UDS positive for cocaine    Paroxysmal A-fib ([address] )  Assessment & Plan  Patient is currently in AFib, continue anticoagulation with Xarelto  D/c on home dose beta-blocker and amiodarone       Elevated troponin  Assessment & Plan  Initial troponin was 53, remained stable between 50-57  Likely secondary to CHF exacerbation  Cardiology consulted, appreciate recommendations      Nicotine dependence  Assessment & Plan  Smoking cessation education completed    Acute renal failure superimposed on stage 3 chronic kidney disease [facility])  Assessment & Plan  Lab Results   Component Value Date    EGFR 27 04/08/2022    EGFR 22 04/07/2022    EGFR 23 03/29/2022    CREATININE 1 95 (H) 04/08/2022    CREATININE 2 34 (H) 04/07/2022    CREATININE 2 28 (H) 03/29/2022     Cr 2 34 on presentation; baseline Cr 1 7, likely cardiorenal  Pt reports nausea and vomiting with poor PO intake for the last month 2/2 to sensation of food "getting stuck group home down", however does not look dehydrated on physical exam    Cr decreased to 1 9 this am (4/8)  Lasix 40 mg IV BID again today  D/c on Lasix 20mg daily    History of ventricular tachycardia [facility]) with ICD  Assessment & Plan  Status post ICD placement  D/c on amiodarone     * Chest pain at rest  Assessment & Plan  January 31st 2022 echo with a 55% ejection fraction and diastolic grade 2 dysfunction  Cardiologist is Dr Serina Floyd  Cardiac risk factors of hx of afib, hypertension, hyperlipidemia, tobacco abuse  Pt presented to ED 4/7 complaining of substernal chest pain that 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  She describes 7/10  substernal chest pain that feels "like someone is pressing on my chest"  The pain was partially relieved when she sat down but has been constant since this morning  SOB after climbing one step for the past few days  Patient does note legs are heavy when I walk"  Denies lower extremity edema  Wells Score for PE 0 on admission  Troponins elevated to 53 on presentation, troponins remained stable between 50-57 likely non MI  No ST elevations on initial EKG; EKG showed atrial fibrillation  CXR showed mild pulmonary vascular congestion    Pt reports improvement of chest pain and SOB this am (4/8)   Improvement w/ Lasix suggests CHF exacerbation  Telemetry shows afib with PVCs    Echo 4/8 showed EF 11%, normal systolic function and wall motion, grade II (pseudonormal) relaxation in LV  Wall thickness is severely increased  There is moderate concentric hypertrophy and more severe asymmetric hypertrophy  Mildly reduced RV systolic function  Mildly dilated LA  Trace MR     40 Lasix BID today; d/c on 20mg QD  Continue home meds: metoprolol, Xarelto, amiodarone  Resume home Entresto  Added amlodipine for hypertension        VTE Pharmacologic Prophylaxis: tx Xarelto    Mechanical VTE Prophylaxis in Place: No    Patient Centered Rounds: I have performed bedside rounds with nursing staff today      Discussions with Specialists or Other Care Team Provider: yes    Education and Discussions with Family / Patient: yes    Current Length of Stay: 0 day(s)    Current Patient Status: Observation     Discharge Plan / Estimated Discharge Date: Anticipate discharge tomorrow to home  Code Status: Level 1 - Full Code      Subjective:   Pt hypertensive yesterday evening and overnight with max /118 at 1615 yesterday ()  Received one dose oxycodone 2 5mg PO  1850 and one dose hydralazine 2000  Pt s/e at bedside  Pt reports improvement in SOB and chest pain  Complains of headache and requests pain medication and said she would leave AMA if she did not receive something for her headache  Explained to pt that headache likely 2/2 to hypertension so we would try to control hypertension before giving more pain meds  Pt denies dizziness, vision changes, nausea/vomiting  Objective:     Vitals:   Temp (24hrs), Av 3 °F (36 3 °C), Min:96 6 °F (35 9 °C), Max:97 9 °F (36 6 °C)    Temp:  [96 6 °F (35 9 °C)-97 9 °F (36 6 °C)] 97 6 °F (36 4 °C)  HR:  [] 93  Resp:  [16-22] 16  BP: (163-215)/() 194/110  SpO2:  [93 %-98 %] 96 %  Body mass index is 33 15 kg/m²  Input and Output Summary (last 24 hours): Intake/Output Summary (Last 24 hours) at 2022 1218  Last data filed at 2022 1125  Gross per 24 hour   Intake 240 ml   Output 2400 ml   Net -2160 ml       Physical Exam:     Physical Exam  Vitals and nursing note reviewed  HENT:      Head: Normocephalic and atraumatic  Mouth/Throat:      Mouth: Mucous membranes are moist    Eyes:      Extraocular Movements: Extraocular movements intact  Conjunctiva/sclera: Conjunctivae normal    Cardiovascular:      Rate and Rhythm: Normal rate  Rhythm irregularly irregular  Pulses: Normal pulses  Heart sounds: Normal heart sounds  Pulmonary:      Breath sounds: Examination of the right-lower field reveals rales  Examination of the left-lower field reveals rales  Rales present        Comments: Rales decreased from yesterday  Abdominal: General: Abdomen is flat  Bowel sounds are normal       Palpations: Abdomen is soft  Tenderness: There is no abdominal tenderness  Musculoskeletal:      Right lower leg: No edema  Left lower leg: No edema  Skin:     General: Skin is warm and dry  Neurological:      Mental Status: She is alert and oriented to person, place, and time  Psychiatric:         Mood and Affect: Affect is angry  Behavior: Behavior is agitated            Additional Data:     Labs:  Results from last 7 days   Lab Units 04/08/22  0437   WBC Thousand/uL 6 26   HEMOGLOBIN g/dL 10 7*   HEMATOCRIT % 33 5*   PLATELETS Thousands/uL 156   NEUTROS PCT % 68   LYMPHS PCT % 23   MONOS PCT % 8   EOS PCT % 1     Results from last 7 days   Lab Units 04/08/22  0437   SODIUM mmol/L 138   POTASSIUM mmol/L 3 3*   CHLORIDE mmol/L 103   CO2 mmol/L 27   BUN mg/dL 21   CREATININE mg/dL 1 95*   ANION GAP mmol/L 8   CALCIUM mg/dL 8 5   ALBUMIN g/dL 3 5   TOTAL BILIRUBIN mg/dL 0 76   ALK PHOS U/L 43   ALT U/L 10   AST U/L 14   GLUCOSE RANDOM mg/dL 96                       Imaging: no new imaging    Recent Cultures (last 7 days):           Lines/Drains:  Invasive Devices  Report    Peripheral Intravenous Line            Peripheral IV 04/07/22 Right Antecubital <1 day                Telemetry:   Telemetry Orders (From admission, onward)             48 Hour Telemetry Monitoring  Continuous x 48 hours        References:    Telemetry Guidelines   Question:  Reason for 48 Hour Telemetry  Answer:  Acute MI, chest pain - R/O MI, or unstable angina                    Last 24 Hours Medication List:   Current Facility-Administered Medications   Medication Dose Route Frequency Provider Last Rate    acetaminophen  650 mg Oral Q6H PRN Jodi Casillas MD      amiodarone  200 mg Oral Daily Jodi Casillas MD      amLODIPine  5 mg Oral Daily Jodi Casillas MD      bisacodyl  10 mg Rectal Daily PRN Asya Bass MD      furosemide  40 mg Intravenous BID Janes Ewing MD      hydrALAZINE  10 mg Intravenous Q4H PRN Sierra Blanc MD      [START ON 4/9/2022] magnesium oxide  400 mg Oral BID Janes Ewing MD      metoprolol succinate  150 mg Oral BID Janes Ewing MD      nicotine  1 patch Transdermal Daily Janes Ewing MD      nitroglycerin  0 4 mg Sublingual Q5 Min PRN Janes Ewing MD      ondansetron  4 mg Intravenous Q6H PRN Janes Ewing MD      oxyCODONE  5 mg Oral Q6H PRN Sierra Blanc MD      pantoprazole  40 mg Intravenous Q24H [address] Jodi Alfaro MD      potassium chloride  20 mEq Oral BID Janes Ewing MD      potassium chloride  20 mEq Intravenous Once Janes Ewing MD Stopped (04/08/22 1121)    rivaroxaban  15 mg Oral QPM Janes Ewing MD      sacubitril-valsartan  1 tablet Oral BID Janes Ewing MD      senna  1 tablet Oral Daily Janes Ewing MD      sodium chloride (PF)  3 mL Intravenous Q1H PRN Janes Ewing MD          Today, Patient Was Seen By: Cherelle Salamanca MD    ** Please Note: This note has been constructed using a voice recognition system   **

## 2022-04-08 NOTE — UTILIZATION REVIEW
Initial Clinical Review    Admission: Date/Time/Statement:   Admission Orders (From admission, onward)     Ordered        04/07/22 1515  Place in Observation  Once                      Orders Placed This Encounter   Procedures    Place in Observation     Standing Status:   Standing     Number of Occurrences:   1     Order Specific Question:   Level of Care     Answer:   Med Surg [16]     ED Arrival Information     Expected Arrival Acuity    - 4/7/2022 12:25 Urgent         Means of arrival Escorted by Service Admission type    Walk-In Self Hospitalist Urgent         Arrival complaint    chest pain         Chief Complaint   Patient presents with    Chest Pain     pt presents to ed via walk in with chest pain that started a couple hours ago also states she is SOB        Initial Presentation: 62 y o  female  with a history of atrial fibrillation on Xarelto s/p ablation, systolic and diastolic heart failure with ICD, hypertension, hyperlipidemia, hepatitis C, CKD who presents to [facility] ED with c/o constant, substernal chest pain that 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  She describes 7/10 substernal chest pain that feels "like someone is pressing on my chest", slightly relieved with rest   Also c/o SOB recently with exertion and dysphagia resulting in poor po intake and vomiting after eating  EKG  shows atrial fibrillation, nonspecific ST T wave changes  Admit observation status dt chest pain at rest:  Med surg telemetry, cardiology consult, obtain echo, continue BB, trend trops, iv lasix, strict IO and daily wts, continue home meds  4/8 Cardiology Consult  Hypertensive urgency dt cocaine use  LVEF previously reduced 30% now improved to 55% on TTE from 1/2022  Clinically improving  Feels well with ambulation and no recurrence of chest pain  Continue Lasix 40 mg IV b i d  Monitor IO and daily wts  Continue Xarelto, continue home amiodarone      ED Triage Vitals   Temperature Pulse Respirations Blood Pressure SpO2   04/07/22 1249 04/07/22 1249 04/07/22 1249 04/07/22 1249 04/07/22 1249   97 9 °F (36 6 °C) (!) 106 18 (!) 167/125 98 %      Temp Source Heart Rate Source Patient Position - Orthostatic VS BP Location FiO2 (%)   04/07/22 1249 04/07/22 1249 04/07/22 1249 04/07/22 1249 --   Oral Monitor Lying Right leg       Pain Score       04/07/22 1250       8          Wt Readings from Last 1 Encounters:   04/08/22 96 kg (211 lb 10 3 oz)     Additional Vital Signs:   Date/Time Temp Pulse Resp BP MAP (mmHg) SpO2 Calculated FIO2 (%) - Nasal Cannula Nasal Cannula O2 Flow Rate (L/min) O2 Device Patient Position - Orthostatic VS   04/08/22 1526 98 °F (36 7 °C) 61 18 161/93 -- 96 % -- -- None (Room air) --   04/08/22 0757 -- -- -- -- -- -- -- -- None (Room air) --   04/08/22 0726 -- -- -- 194/110 Abnormal  -- -- -- -- -- Lying   04/08/22 0724 97 6 °F (36 4 °C) 93 16 198/101 Abnormal  -- 96 % -- -- -- Lying   04/08/22 0224 97 8 °F (36 6 °C) 60 18 167/93 132 95 % 36 4 L/min Nasal cannula Lying   04/07/22 2300 97 3 °F (36 3 °C) Abnormal  84 18 163/92 133 96 % 36 4 L/min Nasal cannula Lying   04/07/22 2147 -- -- -- 190/100 Abnormal  -- -- -- -- -- Lying   04/07/22 1901 96 7 °F (35 9 °C) Abnormal  70 20 194/92 Abnormal  -- 95 % -- -- -- Lying   04/07/22 1635 -- -- 22 190/90 Abnormal  -- -- 36 4 L/min Nasal cannula Lying   04/07/22 1614 96 6 °F (35 9 °C) Abnormal  60 18 215/118 Abnormal  -- 93 % -- -- -- Lying   04/07/22 1536 -- 69 20 170/120 Abnormal  -- 95 % 28 2 L/min Nasal cannula Lying   04/07/22 1356 -- 65 16 178/113 Abnormal  -- 97 % -- -- None (Room air) Lying   04/07/22 1318 -- -- -- -- -- -- -- -- None (Room air) --       Pertinent Labs/Diagnostic Test Results:   4/7 EKG:  Atrial-paced rhythm  Premature atrial complexes  Right bundle branch block  Left anterior fascicular block  **Bifascicular block **  Left ventricular hypertrophy with QRS widening and repolarization abnormality  Abnormal ECG  4/7 ECHO PENDING    X-ray chest 1 view portable   ED Interpretation by Rob Dalton DO (04/07 1407)   No acute cardiopulmonary process  Final Result by Orly Garcia MD (04/07 1525)      Mild pulmonary vascular congestion       Results from last 7 days   Lab Units 04/07/22  1314   SARS-COV-2  Negative     Results from last 7 days   Lab Units 04/08/22  0437 04/07/22  1314   WBC Thousand/uL 6 26 7 51   HEMOGLOBIN g/dL 10 7* 11 1*   HEMATOCRIT % 33 5* 34 4*   PLATELETS Thousands/uL 156 186   NEUTROS ABS Thousands/µL 4 31 5 83     Results from last 7 days   Lab Units 04/08/22  0437 04/07/22  1313   SODIUM mmol/L 138 138   POTASSIUM mmol/L 3 3* 4 1   CHLORIDE mmol/L 103 103   CO2 mmol/L 27 27   ANION GAP mmol/L 8 8   BUN mg/dL 21 27*   CREATININE mg/dL 1 95* 2 34*   EGFR ml/min/1 73sq m 27 22   CALCIUM mg/dL 8 5 9 0   MAGNESIUM mg/dL 1 8*  --    PHOSPHORUS mg/dL 2 9  --      Results from last 7 days   Lab Units 04/08/22  0437   AST U/L 14   ALT U/L 10   ALK PHOS U/L 43   TOTAL PROTEIN g/dL 6 5   ALBUMIN g/dL 3 5   TOTAL BILIRUBIN mg/dL 0 76     Results from last 7 days   Lab Units 04/08/22  0437 04/07/22  1313   GLUCOSE RANDOM mg/dL 96 89     Results from last 7 days   Lab Units 04/07/22  1709 04/07/22  1530 04/07/22  1313   HS TNI 0HR ng/L  --   --  53*   HS TNI 2HR ng/L  --  50*  --    HSTNI D2 ng/L  --  -3  --    HS TNI 4HR ng/L 55*  --   --    HSTNI D4 ng/L 2  --   --      Results from last 7 days   Lab Units 04/07/22  1313   TSH 3RD GENERATON uIU/mL 3 569     Results from last 7 days   Lab Units 04/07/22  1313   NT-PRO BNP pg/mL 16,909*     Results from last 7 days   Lab Units 04/07/22  1853   CLARITY UA  Clear   COLOR UA  Yellow   SPEC GRAV UA  1 015   PH UA  7 0   GLUCOSE UA mg/dl Negative   KETONES UA mg/dl Negative   BLOOD UA  Trace-Intact*   PROTEIN UA mg/dl Trace*   NITRITE UA  Negative   BILIRUBIN UA  Negative   UROBILINOGEN UA E U /dl 0 2   LEUKOCYTES UA  Negative   WBC UA /hpf 1-2   RBC UA /hpf 1-2   BACTERIA UA /hpf Occasional   EPITHELIAL CELLS WET PREP /hpf Occasional     Results from last 7 days   Lab Units 04/07/22  1314   INFLUENZA A PCR  Negative   INFLUENZA B PCR  Negative   RSV PCR  Negative     Results from last 7 days   Lab Units 04/07/22  1853   AMPH/METH  Negative   BARBITURATE UR  Negative   BENZODIAZEPINE UR  Negative   COCAINE UR  Positive*   METHADONE URINE  Negative   OPIATE UR  Negative   PCP UR  Negative   THC UR  Negative     ED Treatment:   Medication Administration from 04/07/2022 1225 to 04/07/2022 1610       Date/Time Order Dose Route Action     04/07/2022 1354 acetaminophen (TYLENOL) tablet 975 mg 975 mg Oral Given        Past Medical History:   Diagnosis Date    ACS (acute coronary syndrome) ([place] [id number] ) 2/7/2021    Arrhythmia     Arthritis     Atrial fibrillation (HCC)     Atrial fibrillation with rapid ventricular response (HCC) 3/20/2016    Breast lump     CKD (chronic kidney disease) stage 3, GFR 30-59 ml/min (HCC)     Disease of thyroid gland     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 ([place] [id number] ) 5/25/2020    GERD (gastroesophageal reflux disease)     H/O transfusion 1987    Hepatitis C     resolved    Hepatitis C     Hyperlipidemia     Hypertension     Irregular heart beat     Pacemaker     Sleep apnea     no cpap    Tachycardia      Present on Admission:   Acute renal failure superimposed on stage 3 chronic kidney disease ([place] [id number] )   History of ventricular tachycardia (HCC) with ICD   Nicotine dependence   Elevated troponin   Paroxysmal A-fib (HCC)   Cocaine abuse ([place] [id number] )      Admitting Diagnosis: Chest pain [R07 9]  DANIELA (acute kidney injury) ([place] [id number] ) [N17 9]  Acute on chronic combined systolic and diastolic congestive heart failure ([place] [id number] ) [I50 43]  Age/Sex: 62 y o  female  Admission Orders:  Scheduled Medications:  amiodarone, 200 mg, Oral, Daily  amLODIPine, 5 mg, Oral, Daily  furosemide, 40 mg, Intravenous, BID  [START ON 4/9/2022] magnesium oxide, 400 mg, Oral, BID  magnesium sulfate, 2 g, Intravenous, Once  metoprolol succinate, 150 mg, Oral, BID  nicotine, 1 patch, Transdermal, Daily  pantoprazole, 40 mg, Intravenous, Q24H JEISON  potassium chloride, 20 mEq, Oral, BID  potassium chloride, 20 mEq, Intravenous, Once   Followed by  potassium chloride, 20 mEq, Intravenous, Once  rivaroxaban, 15 mg, Oral, QPM  senna, 1 tablet, Oral, Daily      Continuous IV Infusions: NONE     PRN Meds:  acetaminophen, 650 mg, Oral, Q6H PRN X2 THUS FAR  bisacodyl, 10 mg, Rectal, Daily PRN  hydrALAZINE, 5 mg, Intravenous, Q4H PRN X1 THUS FAR  nitroglycerin, 0 4 mg, Sublingual, Q5 Min PRN  ondansetron, 4 mg, Intravenous, Q6H PRN  sodium chloride (PF), 3 mL, Intravenous, Q1H PRN        IP CONSULT TO CARDIOLOGY    Network Utilization Review Department  ATTENTION: Please call with any questions or concerns to [phone] and carefully listen to the prompts so that you are directed to the right person  All voicemails are confidential   [place] all requests for admission clinical reviews, approved or denied determinations and any other requests to dedicated fax number below belonging to the campus where the patient is receiving treatment   List of dedicated fax numbers for the Facilities:  [address] [place] [address] DENIALS (Administrative/Medical Necessity) [phone]   [address] (Maternity/NICU/Pediatrics) [phone]   [address] [place] [address] [place] [address] [place] [address] [facility] [phone]   [place] [address] [phone]
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
## 2022-04-08 NOTE — SPEECH THERAPY NOTE
Consult received  Records reviewed  No h/o oropharyngeal dysphagia  No report of s/s dysphagia with food/liquid (declined potassium pill)  SLP not in building for evaluation this pm   Noted that d/c summary begun  Consider provision of pill cutter and  for home use; consider OP swallowing evaluation of any additional concerns arise (and pt unable to be seen prior to d/c)

## 2022-04-08 NOTE — DISCHARGE SUMMARY
[address]  66   Discharge- Shannon Watkins 1965, 62 y o  female MRN: [id number]  Unit/Bed#: -Keisha Encounter: [id number]  Primary Care Provider: Helen Garcia MD   Date and time admitted to hospital: 4/7/2022 12:28 PM    Electrolyte abnormality  Assessment & Plan  Likely 2/2 to poor PO intake, vomiting, and changes in diuretic dose    Replete as needed    Hypertensive crisis  Assessment & Plan  Episodes of hypertension with Bps 190s/100s  Likely precipitated by cocaine use w/ cardiac hx  Complaining of headaches w/o vision changes, nausea/vomiting    D/c on metoprolol, Entresto, amlodipine, Lasix      Cocaine abuse (HCC)  Assessment & Plan  History of cocaine abuse, however patient denies any recent consumption  UDS positive for cocaine    Paroxysmal A-fib ([address] )  Assessment & Plan  Patient is currently in AFib, continue anticoagulation with Xarelto  D/c on home dose beta-blocker and amiodarone       Elevated troponin  Assessment & Plan  Initial troponin was 53, remained stable between 50-57  Likely secondary to CHF exacerbation        Nicotine dependence  Assessment & Plan  Smoking cessation education completed    Acute renal failure superimposed on stage 3 chronic kidney disease [facility])  Assessment & Plan  Lab Results   Component Value Date    EGFR 27 04/08/2022    EGFR 22 04/07/2022    EGFR 23 03/29/2022    CREATININE 1 95 (H) 04/08/2022    CREATININE 2 34 (H) 04/07/2022    CREATININE 2 28 (H) 03/29/2022     Cr 2 34 on presentation; baseline Cr 1 7, likely cardiorenal  Pt reports nausea and vomiting with poor PO intake for the last month 2/2 to sensation of food "getting stuck MCC down", however does not look dehydrated on physical exam  Received IV Lasix with improvement of kidney function  D/c on Lasix 20mg daily    History of ventricular tachycardia (HCC) with ICD  Assessment & Plan  Status post ICD placement  D/c on amiodarone     * Chest pain at rest  Assessment & Plan  January 31st 2022 echo with a 55% ejection fraction and diastolic grade 2 dysfunction  Cardiologist is Dr Judd Kearns  Cardiac risk factors of hx of afib, hypertension, hyperlipidemia, tobacco abuse  Pt presented to ED 4/7 complaining of substernal chest pain that 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  She describes 7/10  substernal chest pain that feels "like someone is pressing on my chest"  The pain was partially relieved when she sat down but has been constant since this morning  SOB after climbing one step for the past few days  Patient does note legs are heavy when I walk"  Denies lower extremity edema  Wells Score for PE 0 on admission  Troponins elevated to 53 on presentation, troponins remained stable between 50-57 likely non MI  No ST elevations on initial EKG; EKG showed atrial fibrillation  CXR showed mild pulmonary vascular congestion    Pt reports improvement of chest pain and SOB this am (4/8)   Improvement w/ Lasix suggests CHF exacerbation  Telemetry shows afib with PVCs    Echo 4/8 showed EF 43%, normal systolic function and wall motion, grade II (pseudonormal) relaxation in LV  Wall thickness is severely increased  There is moderate concentric hypertrophy and more severe asymmetric hypertrophy  Mildly reduced RV systolic function  Mildly dilated LA   Trace MR     40 Lasix BID today; d/c on 20mg QD  Continue home meds: metoprolol, Xarelto, amiodarone, Entresto  Added amlodipine for hypertension            Medical Problems             Resolved Problems  Date Reviewed: 4/9/2022    None                Admission Date:   Admission Orders (From admission, onward)     Ordered        04/07/22 1515  Place in Observation  Once                        Admitting Diagnosis: Chest pain [R07 9]  DANIELA (acute kidney injury) ([facility] ) [N17 9]  Acute on chronic combined systolic and diastolic congestive heart failure ([facility] ) [I50 43]    HPI: 58yo female who presented with 7/10 substernal chest pain that felt "like someone is pressing on my chest" that began suddenly while she was at work  She also reported SOB and a sensation of leg heaviness with minimal exertion for a few days PTA  She reports poor PO intake and vomiting after meals for the past month, and attributed it to dysphagia  Procedures Performed: No orders of the defined types were placed in this encounter  Summary of Hospital Course: In the ED, EKG showed afib without ST elevations; CXR showed mild pulmonary vascular congestion; troponins were elevated but stable between 50-57  SOB and chest pain much improved after two doses 40mg Lasix IV  Patient had persistently elevated blood pressure measurement likely secondary to cocaine use  Her only symptom was headache  She was treated with home doses of metoprolol with addition of amlodipine and hydralazine prn  Home Entresto resumed after resolution of DANIELA  Telemetry throughout hospitalization showed afib, intermittently showed PVCs  Cardiology was consulted, they recommended to continue home medication  On the day of discharge patient was clinically stable, advised to take all her medication as prescribed and follow-up with her outpatient cardiologist     Significant Findings, Care, Treatment and Services Provided: Cardiology consult  Echo 4/8: EF 55%, grade II diastolic dysfunction, concentric and asymmetric hypertrophy  Complications: none    Condition at Discharge: fair   Physical Exam  Vitals and nursing note reviewed  HENT:      Head: Normocephalic and atraumatic  Mouth/Throat:      Mouth: Mucous membranes are moist    Eyes:      Extraocular Movements: Extraocular movements intact  Conjunctiva/sclera: Conjunctivae normal    Cardiovascular:      Rate and Rhythm: Normal rate  Rhythm irregularly irregular  Pulses: Normal pulses  Heart sounds: Normal heart sounds  Pulmonary:      Breath sounds:  Normal breath sounds, no crackles, rhonchi or wheezing     Abdominal: General: Abdomen is flat  Bowel sounds are normal       Palpations: Abdomen is soft  Tenderness: There is no abdominal tenderness  Musculoskeletal:      Right lower leg: No edema  Left lower leg: No edema  Skin:     General: Skin is warm and dry  Neurological:      Mental Status: She is alert and oriented to person, place, and time  Psychiatric:         Mood and Affect:  Normal        Discharge instructions/Information to patient and family:   See after visit summary for information provided to patient and family  Provisions for Follow-Up Care:  See after visit summary for information related to follow-up care and any pertinent home health orders  PCP: Anoop Green MD    Disposition: Home    Planned Readmission: no    Discharge Statement   I spent 45 minutes discharging the patient  This time was spent on the day of discharge  I had direct contact with the patient on the day of discharge  Additional documentation is required if more than 30 minutes were spent on discharge  Discharge Medications:  See after visit summary for reconciled discharge medications provided to patient and family

## 2022-04-08 NOTE — PLAN OF CARE
Problem: PAIN - ADULT  Goal: Verbalizes/displays adequate comfort level or baseline comfort level  Description: Interventions:  - Encourage patient to monitor pain and request assistance  - Assess pain using appropriate pain scale  - Administer analgesics based on type and severity of pain and evaluate response  - Implement non-pharmacological measures as appropriate and evaluate response  - Consider cultural and social influences on pain and pain management  - Notify physician/advanced practitioner if interventions unsuccessful or patient reports new pain  Outcome: Progressing     Problem: INFECTION - ADULT  Goal: Absence or prevention of progression during hospitalization  Description: INTERVENTIONS:  - Assess and monitor for signs and symptoms of infection  - Monitor lab/diagnostic results  - Monitor all insertion sites, i e  indwelling lines, tubes, and drains  - Monitor endotracheal if appropriate and nasal secretions for changes in amount and color  - Brandon appropriate cooling/warming therapies per order  - Administer medications as ordered  - Instruct and encourage patient and family to use good hand hygiene technique  - Identify and instruct in appropriate isolation precautions for identified infection/condition  Outcome: Progressing  Goal: Absence of fever/infection during neutropenic period  Description: INTERVENTIONS:  - Monitor WBC    Outcome: Progressing     Problem: SAFETY ADULT  Goal: Patient will remain free of falls  Description: INTERVENTIONS:  - Educate patient/family on patient safety including physical limitations  - Instruct patient to call for assistance with activity   - Consult OT/PT to assist with strengthening/mobility   - Keep Call bell within reach  - Keep bed low and locked with side rails adjusted as appropriate  - Keep care items and personal belongings within reach  - Initiate and maintain comfort rounds  - Make Fall Risk Sign visible to staff  - Offer Toileting every 2 Hours, in advance of need  - Initiate/Maintain bed alarm  - Obtain necessary fall risk management equipment  - Apply yellow socks and bracelet for high fall risk patients  - Consider moving patient to room near nurses station  Outcome: Progressing  Goal: Maintain or return to baseline ADL function  Description: INTERVENTIONS:  -  Assess patient's ability to carry out ADLs; assess patient's baseline for ADL function and identify physical deficits which impact ability to perform ADLs (bathing, care of mouth/teeth, toileting, grooming, dressing, etc )  - Assess/evaluate cause of self-care deficits   - Assess range of motion  - Assess patient's mobility; develop plan if impaired  - Assess patient's need for assistive devices and provide as appropriate  - Encourage maximum independence but intervene and supervise when necessary  - Involve family in performance of ADLs  - Assess for home care needs following discharge   - Consider OT consult to assist with ADL evaluation and planning for discharge  - Provide patient education as appropriate  Outcome: Progressing  Goal: Maintains/Returns to pre admission functional level  Description: INTERVENTIONS:  - Perform BMAT or MOVE assessment daily    - Set and communicate daily mobility goal to care team and patient/family/caregiver  - Collaborate with rehabilitation services on mobility goals if consulted  - Perform Range of Motion 3 times a day  - Reposition patient every 2 hours      - Stand patient 3 times a day  - Ambulate patient 3 times a day  - Out of bed to chair 3 times a day   - Out of bed for meals 3 times a day  - Out of bed for toileting  - Record patient progress and toleration of activity level   Outcome: Progressing     Problem: DISCHARGE PLANNING  Goal: Discharge to home or other facility with appropriate resources  Description: INTERVENTIONS:  - Identify barriers to discharge w/patient and caregiver  - Arrange for needed discharge resources and transportation as appropriate  - Identify discharge learning needs (meds, wound care, etc )  - Arrange for interpretive services to assist at discharge as needed  - Refer to Case Management Department for coordinating discharge planning if the patient needs post-hospital services based on physician/advanced practitioner order or complex needs related to functional status, cognitive ability, or social support system  Outcome: Progressing     Problem: Knowledge Deficit  Goal: Patient/family/caregiver demonstrates understanding of disease process, treatment plan, medications, and discharge instructions  Description: Complete learning assessment and assess knowledge base    Interventions:  - Provide teaching at level of understanding  - Provide teaching via preferred learning methods  Outcome: Progressing     Problem: CARDIOVASCULAR - ADULT  Goal: Maintains optimal cardiac output and hemodynamic stability  Description: INTERVENTIONS:  - Monitor I/O, vital signs and rhythm  - Monitor for S/S and trends of decreased cardiac output  - Administer and titrate ordered vasoactive medications to optimize hemodynamic stability  - Assess quality of pulses, skin color and temperature  - Assess for signs of decreased coronary artery perfusion  - Instruct patient to report change in severity of symptoms  Outcome: Progressing  Goal: Absence of cardiac dysrhythmias or at baseline rhythm  Description: INTERVENTIONS:  - Continuous cardiac monitoring, vital signs, obtain 12 lead EKG if ordered  - Administer antiarrhythmic and heart rate control medications as ordered  - Monitor electrolytes and administer replacement therapy as ordered  Outcome: Progressing     Problem: METABOLIC, FLUID AND ELECTROLYTES - ADULT  Goal: Electrolytes maintained within normal limits  Description: INTERVENTIONS:  - Monitor labs and assess patient for signs and symptoms of electrolyte imbalances  - Administer electrolyte replacement as ordered  - Monitor response to electrolyte replacements, including repeat lab results as appropriate  - Instruct patient on fluid and nutrition as appropriate  Outcome: Progressing  Goal: Fluid balance maintained  Description: INTERVENTIONS:  - Monitor labs   - Monitor I/O and WT  - Instruct patient on fluid and nutrition as appropriate  - Assess for signs & symptoms of volume excess or deficit  Outcome: Progressing     Problem: HEMATOLOGIC - ADULT  Goal: Maintains hematologic stability  Description: INTERVENTIONS  - Assess for signs and symptoms of bleeding or hemorrhage  - Monitor labs  - Administer supportive blood products/factors as ordered and appropriate  Outcome: Progressing     Problem: HEMATOLOGIC - ADULT  Goal: Maintains hematologic stability  Description: INTERVENTIONS  - Assess for signs and symptoms of bleeding or hemorrhage  - Monitor labs  - Administer supportive blood products/factors as ordered and appropriate  Outcome: Progressing

## 2022-04-08 NOTE — ASSESSMENT & PLAN NOTE
Initial troponin was 53, remained stable between 50-57  Likely secondary to CHF exacerbation  Cardiology consulted, appreciate recommendations

## 2022-04-08 NOTE — ASSESSMENT & PLAN NOTE
Episodes of hypertension with Bps 190s/100s  Likely precipitated by cocaine use w/ cardiac hx  Complaining of headaches w/o vision changes, nausea/vomiting  One dose hydralazine 4/7 2000    /100 on repeat measurement this am (4/8) before administration of meds    D/c on metoprolol, Entresto, amlodipine, Lasix

## 2022-04-08 NOTE — PROGRESS NOTES
This writer made aware pts hr in 140's to 170s, upon entering room pt was resting in bed, she stated she does feel her heart racing, but she denies chest pain and sob    SLIM MD aware  Pt received all her perscribed antiarrythmic medication this am    Call bell in hand  [facility]

## 2022-04-08 NOTE — ASSESSMENT & PLAN NOTE
Patient is currently in AFib, continue anticoagulation with Xarelto  D/c on home dose beta-blocker and amiodarone

## 2022-04-08 NOTE — ASSESSMENT & PLAN NOTE
Lab Results   Component Value Date    EGFR 27 04/08/2022    EGFR 22 04/07/2022    EGFR 23 03/29/2022    CREATININE 1 95 (H) 04/08/2022    CREATININE 2 34 (H) 04/07/2022    CREATININE 2 28 (H) 03/29/2022     Cr 2 34 on presentation; baseline Cr 1 7, likely cardiorenal  Pt reports nausea and vomiting with poor PO intake for the last month 2/2 to sensation of food "getting stuck detention down", however does not look dehydrated on physical exam    Cr decreased to 1 9 this am (4/8)  Lasix 40 mg IV BID again today  D/c on Lasix 20mg daily

## 2022-04-08 NOTE — ASSESSMENT & PLAN NOTE
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  She describes 7/10  substernal chest pain that feels "like someone is pressing on my chest"  The pain was partially relieved when she sat down but has been constant since this morning  SOB after climbing one step for the past few days  Patient does note legs are heavy when I walk"  Denies lower extremity edema  Wells Score for PE 0 on admission  Troponins elevated to 53 on presentation, troponins remained stable between 50-57 likely non MI  No ST elevations on initial EKG; EKG showed atrial fibrillation  CXR showed mild pulmonary vascular congestion    Pt reports improvement of chest pain and SOB this am (4/8)   Improvement w/ Lasix suggests CHF exacerbation  Telemetry shows afib with PVCs    Echo 4/8 showed EF 92%, normal systolic function and wall motion, grade II (pseudonormal) relaxation in LV  Wall thickness is severely increased  There is moderate concentric hypertrophy and more severe asymmetric hypertrophy  Mildly reduced RV systolic function  Mildly dilated LA   Trace MR     40 Lasix BID today; d/c on 20mg QD  Continue home meds: metoprolol, Xarelto, amiodarone  Resume home Entresto  Added amlodipine for hypertension

## 2022-04-09 VITALS
DIASTOLIC BLOOD PRESSURE: 98 MMHG | HEART RATE: 76 BPM | BODY MASS INDEX: 31.23 KG/M2 | SYSTOLIC BLOOD PRESSURE: 129 MMHG | RESPIRATION RATE: 18 BRPM | WEIGHT: 199 LBS | OXYGEN SATURATION: 95 % | TEMPERATURE: 98.7 F | HEIGHT: 67 IN

## 2022-04-09 LAB
ATRIAL RATE: 77 BPM
P AXIS: 0 DEGREES
PR INTERVAL: 122 MS
QRS AXIS: -57 DEGREES
QRSD INTERVAL: 152 MS
QT INTERVAL: 449 MS
QTC INTERVAL: 515 MS
T WAVE AXIS: 99 DEGREES
VENTRICULAR RATE: 79 BPM

## 2022-04-09 PROCEDURE — 93010 ELECTROCARDIOGRAM REPORT: CPT | Performed by: INTERNAL MEDICINE

## 2022-04-09 PROCEDURE — 99217 PR OBSERVATION CARE DISCHARGE MANAGEMENT: CPT | Performed by: INTERNAL MEDICINE

## 2022-04-09 PROCEDURE — C9113 INJ PANTOPRAZOLE SODIUM, VIA: HCPCS | Performed by: INTERNAL MEDICINE

## 2022-04-09 RX ORDER — AMLODIPINE BESYLATE 5 MG/1
5 TABLET ORAL DAILY
Qty: 30 TABLET | Refills: 0 | Status: SHIPPED | OUTPATIENT
Start: 2022-04-10 | End: 2022-05-06

## 2022-04-09 RX ADMIN — OXYCODONE HYDROCHLORIDE 5 MG: 5 TABLET ORAL at 06:13

## 2022-04-09 RX ADMIN — OXYCODONE HYDROCHLORIDE 5 MG: 5 TABLET ORAL at 00:08

## 2022-04-09 RX ADMIN — SACUBITRIL AND VALSARTAN 1 TABLET: 49; 51 TABLET, FILM COATED ORAL at 10:14

## 2022-04-09 RX ADMIN — AMIODARONE HYDROCHLORIDE 200 MG: 200 TABLET ORAL at 10:13

## 2022-04-09 RX ADMIN — OXYCODONE HYDROCHLORIDE 5 MG: 5 TABLET ORAL at 12:31

## 2022-04-09 RX ADMIN — FUROSEMIDE 20 MG: 20 TABLET ORAL at 10:13

## 2022-04-09 RX ADMIN — AMLODIPINE BESYLATE 5 MG: 5 TABLET ORAL at 10:13

## 2022-04-09 RX ADMIN — METOPROLOL SUCCINATE 150 MG: 100 TABLET, EXTENDED RELEASE ORAL at 10:13

## 2022-04-09 RX ADMIN — ACETAMINOPHEN 650 MG: 325 TABLET ORAL at 10:22

## 2022-04-09 RX ADMIN — POTASSIUM CHLORIDE 20 MEQ: 1500 TABLET, EXTENDED RELEASE ORAL at 10:11

## 2022-04-09 RX ADMIN — Medication 400 MG: at 10:14

## 2022-04-09 RX ADMIN — PANTOPRAZOLE SODIUM 40 MG: 40 INJECTION, POWDER, FOR SOLUTION INTRAVENOUS at 10:14

## 2022-04-09 RX ADMIN — SENNOSIDES 8.6 MG: 8.6 TABLET, FILM COATED ORAL at 10:14

## 2022-04-09 NOTE — ASSESSMENT & PLAN NOTE
Episodes of hypertension with Bps 190s/100s  Likely precipitated by cocaine use w/ cardiac hx  Complaining of headaches w/o vision changes, nausea/vomiting    D/c on metoprolol, Entresto, amlodipine, Lasix

## 2022-04-09 NOTE — PLAN OF CARE
Problem: PAIN - ADULT  Goal: Verbalizes/displays adequate comfort level or baseline comfort level  Description: Interventions:  - Encourage patient to monitor pain and request assistance  - Assess pain using appropriate pain scale  - Administer analgesics based on type and severity of pain and evaluate response  - Implement non-pharmacological measures as appropriate and evaluate response  - Consider cultural and social influences on pain and pain management  - Notify physician/advanced practitioner if interventions unsuccessful or patient reports new pain  Outcome: Progressing     Problem: INFECTION - ADULT  Goal: Absence or prevention of progression during hospitalization  Description: INTERVENTIONS:  - Assess and monitor for signs and symptoms of infection  - Monitor lab/diagnostic results  - Monitor all insertion sites, i e  indwelling lines, tubes, and drains  - Monitor endotracheal if appropriate and nasal secretions for changes in amount and color  - [place] appropriate cooling/warming therapies per order  - Administer medications as ordered  - Instruct and encourage patient and family to use good hand hygiene technique  - Identify and instruct in appropriate isolation precautions for identified infection/condition  Outcome: Progressing     Problem: SAFETY ADULT  Goal: Patient will remain free of falls  Description: INTERVENTIONS:  - Educate patient/family on patient safety including physical limitations  - Instruct patient to call for assistance with activity   - Consult OT/PT to assist with strengthening/mobility   - Keep Call bell within reach  - Keep bed low and locked with side rails adjusted as appropriate  - Keep care items and personal belongings within reach  - Initiate and maintain comfort rounds  - Offer Toileting every 2 Hours, in advance of need  - Apply yellow socks and bracelet for high fall risk patients  - Consider moving patient to room near nurses station  Outcome: Progressing

## 2022-04-09 NOTE — ASSESSMENT & PLAN NOTE
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  She describes 7/10  substernal chest pain that feels "like someone is pressing on my chest"  The pain was partially relieved when she sat down but has been constant since this morning  SOB after climbing one step for the past few days  Patient does note legs are heavy when I walk"  Denies lower extremity edema  Wells Score for PE 0 on admission  Troponins elevated to 53 on presentation, troponins remained stable between 50-57 likely non MI  No ST elevations on initial EKG; EKG showed atrial fibrillation  CXR showed mild pulmonary vascular congestion    Pt reports improvement of chest pain and SOB this am (4/8)   Improvement w/ Lasix suggests CHF exacerbation  Telemetry shows afib with PVCs    Echo 4/8 showed EF 01%, normal systolic function and wall motion, grade II (pseudonormal) relaxation in LV  Wall thickness is severely increased  There is moderate concentric hypertrophy and more severe asymmetric hypertrophy  Mildly reduced RV systolic function  Mildly dilated LA   Trace MR     40 Lasix BID today; d/c on 20mg QD  Continue home meds: metoprolol, Xarelto, amiodarone, Entresto  Added amlodipine for hypertension

## 2022-04-09 NOTE — PLAN OF CARE
Problem: PAIN - ADULT  Goal: Verbalizes/displays adequate comfort level or baseline comfort level  Description: Interventions:  - Encourage patient to monitor pain and request assistance  - Assess pain using appropriate pain scale  - Administer analgesics based on type and severity of pain and evaluate response  - Implement non-pharmacological measures as appropriate and evaluate response  - Consider cultural and social influences on pain and pain management  - Notify physician/advanced practitioner if interventions unsuccessful or patient reports new pain  Outcome: Progressing     Problem: INFECTION - ADULT  Goal: Absence or prevention of progression during hospitalization  Description: INTERVENTIONS:  - Assess and monitor for signs and symptoms of infection  - Monitor lab/diagnostic results  - Monitor all insertion sites, i e  indwelling lines, tubes, and drains  - Monitor endotracheal if appropriate and nasal secretions for changes in amount and color  - [place] appropriate cooling/warming therapies per order  - Administer medications as ordered  - Instruct and encourage patient and family to use good hand hygiene technique  - Identify and instruct in appropriate isolation precautions for identified infection/condition  Outcome: Progressing     Problem: DISCHARGE PLANNING  Goal: Discharge to home or other facility with appropriate resources  Description: INTERVENTIONS:  - Identify barriers to discharge w/patient and caregiver  - Arrange for needed discharge resources and transportation as appropriate  - Identify discharge learning needs (meds, wound care, etc )  - Arrange for interpretive services to assist at discharge as needed  - Refer to Case Management Department for coordinating discharge planning if the patient needs post-hospital services based on physician/advanced practitioner order or complex needs related to functional status, cognitive ability, or social support system  Outcome: Progressing Problem: Knowledge Deficit  Goal: Patient/family/caregiver demonstrates understanding of disease process, treatment plan, medications, and discharge instructions  Description: Complete learning assessment and assess knowledge base  Interventions:  - Provide teaching at level of understanding  - Provide teaching via preferred learning methods  Outcome: Progressing     Problem: CARDIOVASCULAR - ADULT  Goal: Maintains optimal cardiac output and hemodynamic stability  Description: INTERVENTIONS:  - Monitor I/O, vital signs and rhythm  - Monitor for S/S and trends of decreased cardiac output  - Administer and titrate ordered vasoactive medications to optimize hemodynamic stability  - Assess quality of pulses, skin color and temperature  - Assess for signs of decreased coronary artery perfusion  - Instruct patient to report change in severity of symptoms  Outcome: Progressing     Problem: METABOLIC, FLUID AND ELECTROLYTES - ADULT  Goal: Electrolytes maintained within normal limits  Description: INTERVENTIONS:  - Monitor labs and assess patient for signs and symptoms of electrolyte imbalances  - Administer electrolyte replacement as ordered  - Monitor response to electrolyte replacements, including repeat lab results as appropriate  - Instruct patient on fluid and nutrition as appropriate  Outcome: Progressing     Problem: HEMATOLOGIC - ADULT  Goal: Maintains hematologic stability  Description: INTERVENTIONS  - Assess for signs and symptoms of bleeding or hemorrhage  - Monitor labs  - Administer supportive blood products/factors as ordered and appropriate  Outcome: Progressing     Problem: Nutrition/Hydration-ADULT  Goal: Nutrient/Hydration intake appropriate for improving, restoring or maintaining nutritional needs  Description: Monitor and assess patient's nutrition/hydration status for malnutrition  Collaborate with interdisciplinary team and initiate plan and interventions as ordered    Monitor patient's weight and dietary intake as ordered or per policy  Utilize nutrition screening tool and intervene as necessary  Determine patient's food preferences and provide high-protein, high-caloric foods as appropriate       INTERVENTIONS:  - Monitor oral intake, urinary output, labs, and treatment plans  - Assess nutrition and hydration status and recommend course of action  - Evaluate amount of meals eaten  - Assist patient with eating if necessary   - Allow adequate time for meals  - Recommend/ encourage appropriate diets, oral nutritional supplements, and vitamin/mineral supplements  - Order, calculate, and assess calorie counts as needed  - Recommend, monitor, and adjust tube feedings and TPN/PPN based on assessed needs  - Assess need for intravenous fluids  - Provide specific nutrition/hydration education as appropriate  - Include patient/family/caregiver in decisions related to nutrition  Outcome: Progressing

## 2022-04-09 NOTE — ASSESSMENT & PLAN NOTE
Lab Results   Component Value Date    EGFR 27 04/08/2022    EGFR 22 04/07/2022    EGFR 23 03/29/2022    CREATININE 1 95 (H) 04/08/2022    CREATININE 2 34 (H) 04/07/2022    CREATININE 2 28 (H) 03/29/2022     Cr 2 34 on presentation; baseline Cr 1 7, likely cardiorenal  Pt reports nausea and vomiting with poor PO intake for the last month 2/2 to sensation of food "getting stuck correction down", however does not look dehydrated on physical exam  Received IV Lasix with improvement of kidney function  D/c on Lasix 20mg daily

## 2022-04-09 NOTE — DISCHARGE INSTR - AVS FIRST PAGE
Please continue taking your medications as prescribed, your Xarelto dose was decreased from 20 mg to 50 mg daily according to your kidney function  Please make an appointment with nephrologist to talk about your chronic kidney disease  Do blood work next Monday or Tuesday and discuss results with Dr Lora Fu  Follow-up with your cardiologist as soon as possible regarding recent hospitalization

## 2022-04-09 NOTE — DISCHARGE INSTRUCTIONS
Heart Failure   WHAT YOU NEED TO KNOW:   Heart failure is a condition that does not allow your heart to fill or pump properly  Not enough oxygen in your blood gets to your organs and tissues  Fluid may not move through your body properly  Fluid builds up and causes swelling and trouble breathing  This is known as congestive heart failure  Heart failure may start in the left or right ventricle  Heart failure is often caused by damage or injury to your heart  The damage may be caused by other heart problems, diabetes, or high blood pressure  The damage may have also been caused by an infection  Heart failure is a long-term condition that tends to get worse over time  It is important to manage your health to improve your quality of life  DISCHARGE INSTRUCTIONS:   Call your local emergency number (911 in the [address] [facility]) if:   · You have any of the following signs of a heart attack:      ? Squeezing, pressure, or pain in your chest    ? You may  also have any of the following:     § Discomfort or pain in your back, neck, jaw, stomach, or arm    § Shortness of breath    § Nausea or vomiting    § Lightheadedness or a sudden cold sweat      Call your doctor if:   · Your heartbeat is fast, slow, or uneven all the time  · You have symptoms of worsening heart failure:      ? Shortness of breath at rest, at night, or that is getting worse in any way    ? Weight gain of 3 or more pounds (1 4 kg) in a day, or more than your healthcare provider says is okay    ? More swelling in your legs or ankles    ? Abdominal pain or swelling    ? More coughing    ? Loss of appetite    ? Feeling tired all the time    · You feel hopeless or depressed, or you have lost interest in things you used to enjoy  · You often feel worried or afraid  · You have questions or concerns about your condition or care  Medicines:   · Medicines  may be needed to help regulate your heart rhythm   You may also need medicine to lower your blood pressure, and to decrease extra fluids  · Take your medicine as directed  Contact your healthcare provider if you think your medicine is not helping or if you have side effects  Tell him of her if you are allergic to any medicine  Keep a list of the medicines, vitamins, and herbs you take  Include the amounts, and when and why you take them  Bring the list or the pill bottles to follow-up visits  Carry your medicine list with you in case of an emergency  Go to cardiac rehab if directed:  Cardiac rehab is a program run by specialists who will help you safely strengthen your heart  The program includes exercise, relaxation, stress management, and heart-healthy nutrition  Manage swelling from extra fluid:   · Elevate (raise) your legs above the level of your heart  This will help with fluid that builds up in your legs or ankles  Elevate your legs as often as possible during the day  Prop your legs on pillows or blankets to keep them elevated comfortably  Try not to stand for long periods of time during the day  Move around to keep your blood circulating  · Limit sodium (salt)  Ask how much sodium you can have each day  Your healthcare provider may give you a limit, such as 2,300 milligrams (mg) a day  Your provider or a dietitian can teach you how to read food labels for the number of mg in a food  He or she can also help you find ways to have less salt  For example, if you add salt to food as you cook, do not add more at the table  · Drink liquids as directed  You may need to limit the amount of liquid you drink within 24 hours  Your healthcare provider will tell you how much liquid to have and which liquids are best for you  He or she may tell you to limit liquid to 1 5 to 2 liters in a day  He or she will also tell you how often to drink liquid throughout the day  · Weigh yourself every morning  Use the same scale, in the same spot   Do this after you use the bathroom, but before you eat or drink  Wear the same type of clothing each time  Write down your weight and call your healthcare provider if you have a sudden weight gain  Swelling and weight gain are signs of fluid buildup  Manage heart failure: Your quality of life may improve with treatment and the following:  · Do not smoke  Nicotine and other chemicals in cigarettes and cigars can cause lung and heart damage  Ask your healthcare provider for information if you currently smoke and need help to quit  E-cigarettes or smokeless tobacco still contain nicotine  Talk to your healthcare provider before you use these products  · Do not drink alcohol or use illegal drugs  Alcohol and drugs can increase your risk for high blood pressure, diabetes, and coronary artery disease  · Eat heart-healthy foods  Heart-healthy foods include fruits, vegetables, lean meat (such as beef, chicken, or pork), and low-fat dairy products  Fatty fish such as salmon and tuna are also heart healthy  Other heart-healthy foods include walnuts, whole-grain breads, beans, and cooked beans  Replace butter and margarine with heart-healthy oils such as olive oil or canola oil  Your provider or a dietitian can help you create heart-healthy meal plans  · Manage any chronic health conditions you have  These include high blood pressure, diabetes, obesity, high cholesterol, metabolic syndrome, and COPD  You will have fewer symptoms if you manage these health conditions  Follow your healthcare provider's recommendations and follow up with him or her regularly  · Maintain a healthy weight  Being overweight can increase your risk for high blood pressure, diabetes, and coronary artery disease  These conditions can make your symptoms worse  Ask your healthcare provider how much you should weigh  Ask him or her to help you create a weight loss plan if you are overweight  · Stay active  Activity can help keep your symptoms from getting worse   Walking is a type of physical activity that helps maintain your strength and improve your mood  Physical activity also helps you manage your weight  Work with your healthcare provider to create an exercise plan that is right for you  · Get vaccines as directed  The flu and pneumonia can be severe for a person who has heart failure  Vaccines protect you from these infections  Get a flu shot every year as soon as it is recommended, usually in September or October  You may also need the pneumonia vaccine  Your healthcare provider can tell you if you need other vaccines, and when to get them  Follow up with your doctor within 7 days or as directed: You may need to return for other tests  You may need home health care  A healthcare provider will monitor your vital signs, weight, and make sure your medicines are working  Write down your questions so you remember to ask them during your visits  Join a support group:  Heart failure can be difficult to manage  It may be helpful to talk with others who have heart failure  You may learn how to better manage your condition or get emotional support  For more information:  · [address]  [place] , [place]   Phone: [phone]- [phone]  Web Address: https://[employer] [place]  [facility] 2022 Information is for End User's use only and may not be sold, redistributed or otherwise used for commercial purposes  All illustrations and images included in CareNotes® are the copyrighted property of A D A M , Inc  or [employer]  The above information is an  only  It is not intended as medical advice for individual conditions or treatments  Talk to your doctor, nurse or pharmacist before following any medical regimen to see if it is safe and effective for you

## 2022-04-11 NOTE — UTILIZATION REVIEW
Notification of Discharge   This is a Notification of Discharge from our facility [address]  Please be advised that this patient has been discharge from our facility  Below you will find the admission and discharge date and time including the patients disposition  UTILIZATION REVIEW CONTACT:  Aundrea Miguel  Utilization   Network Utilization Review Department  Phone: [phone] x carefully listen to the prompts  All voicemails are confidential   Email: [email or web address]     PHYSICIAN ADVISORY SERVICES:  FOR CAVK-CL-MENQ REVIEW - MEDICAL NECESSITY DENIAL  Phone: [phone]  Fax: [phone]  Email: [email or web address]     PRESENTATION DATE: 4/7/2022 12:28 PM  OBERVATION ADMISSION DATE:   INPATIENT ADMISSION DATE: N/A N/A   DISCHARGE DATE: 4/9/2022  1:07 PM  DISPOSITION: Home/Self Care Home/Self Care      IMPORTANT INFORMATION:  Send all requests for admission clinical reviews, approved or denied determinations and any other requests to dedicated fax number below belonging to the campus where the patient is receiving treatment   List of dedicated fax numbers:  [address] [place] [address] DENIALS (Administrative/Medical Necessity) [phone]   [address] (Maternity/NICU/Pediatrics) [phone]   Hola Sosa [phone]   [address] [place] [address] [phone]   [address] [place] [address] [phone]   [address] [place] [address] [address] [place] [address],[address] [phone]   [facility]  [phone]   [address] [place] [address]  [address] [facility] 1000 [facility] [phone]

## 2022-04-12 ENCOUNTER — TELEPHONE (OUTPATIENT)
Dept: NEPHROLOGY | Facility: CLINIC | Age: 57
End: 2022-04-12

## 2022-04-13 ENCOUNTER — REMOTE DEVICE CLINIC VISIT (OUTPATIENT)
Dept: CARDIOLOGY CLINIC | Facility: CLINIC | Age: 57
End: 2022-04-13
Payer: COMMERCIAL

## 2022-04-13 DIAGNOSIS — Z95.810 AICD (AUTOMATIC CARDIOVERTER/DEFIBRILLATOR) PRESENT: Primary | ICD-10-CM

## 2022-04-13 PROCEDURE — 93296 REM INTERROG EVL PM/IDS: CPT | Performed by: INTERNAL MEDICINE

## 2022-04-13 PROCEDURE — 93295 DEV INTERROG REMOTE 1/2/MLT: CPT | Performed by: INTERNAL MEDICINE

## 2022-04-13 NOTE — PROGRESS NOTES
Results for orders placed or performed in visit on 04/13/22   Cardiac EP device report    Narrative    MDT-DUAL CHAMBER ICD (AAI-DDD MODE) - ACTIVE SYSTEM IS MRI CONDITIONAL  CARELINK TRANSMISSION: BATTERY VOLTAGE ADEQUATE (2 6 YRS)  AP-37%, -8%  ALL AVAILABLE LEAD PARAMETERS WITHIN NORMAL LIMITS  62 AF EPISODES & CURRENTLY IN AFLUTTER  HX: PAF & ON XARELTO, AMIO & METOPROLOL  AF BURDEN-64 3%  OPTI-VOL FLUID THRESHOLD CROSSED ON 3/30/22 & ONGOING  PT DISCHARGED FROM [facility] ON 4/9/22- ADMITTED FOR CHEST PAIN & SOB; TREATED W/ IV LASIX  PT CURRENTLY ON FUROSEMIDE  WILL RECHECK IN 1 MONTH  NORMAL DEVICE FUNCTION   GV

## 2022-04-20 ENCOUNTER — TELEPHONE (OUTPATIENT)
Dept: CARDIOLOGY CLINIC | Facility: CLINIC | Age: 57
End: 2022-04-20
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
## 2022-05-05 ENCOUNTER — TELEPHONE (OUTPATIENT)
Dept: CARDIOLOGY CLINIC | Facility: CLINIC | Age: 57
End: 2022-05-05

## 2022-05-05 NOTE — TELEPHONE ENCOUNTER
Spoke with Dulce at [address] [place] [address]  They will have to cancel surgery on 5/12/22 if she is not able to get in sooner the 6/10/22  She said pt will call tomorrow to see if there is sooner appt available

## 2022-05-16 ENCOUNTER — REMOTE DEVICE CLINIC VISIT (OUTPATIENT)
Dept: CARDIOLOGY CLINIC | Facility: CLINIC | Age: 57
End: 2022-05-16
Payer: COMMERCIAL

## 2022-05-16 DIAGNOSIS — Z95.810 AICD (AUTOMATIC CARDIOVERTER/DEFIBRILLATOR) PRESENT: Primary | ICD-10-CM

## 2022-05-16 PROCEDURE — G2066 INTER DEVC REMOTE 30D: HCPCS | Performed by: INTERNAL MEDICINE

## 2022-05-16 PROCEDURE — 93297 REM INTERROG DEV EVAL ICPMS: CPT | Performed by: INTERNAL MEDICINE

## 2022-05-16 NOTE — PROGRESS NOTES
Results for orders placed or performed in visit on 05/16/22   Cardiac EP device report    Narrative    MDT-DUAL CHAMBER ICD (AAI-DDD MODE) - [facility] - OPTI-VOL ONLY: OPTI-VOL FLUID THRESHOLD WAS CROSSED BUT NOW WITHIN NORMAL RANGE  BATTERY VOLTAGE ADEQUATE (2 4 YRS)  AP <0 1%  4 3% (AAI-DDD 60)  ALL AVAILABLE LEAD PARAMETERS WITHIN NORMAL LIMITS  NO SIGNIFICANT HIGH RATE EPISODES  1 AT/AF EPISODE REMAINS IN PROGRESS W ITH AFLUTTER ON PRESENTING EGM  AT/AF BURDEN 100%  PATIENT ON Barbara Adalgisa, METOPROLOL SUCC  NORMAL DEVICE FUNCTION    ES
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
## 2022-06-09 ENCOUNTER — OFFICE VISIT (OUTPATIENT)
Dept: NEPHROLOGY | Facility: CLINIC | Age: 57
End: 2022-06-09
Payer: COMMERCIAL

## 2022-06-09 VITALS
HEART RATE: 78 BPM | BODY MASS INDEX: 32.33 KG/M2 | HEIGHT: 67 IN | SYSTOLIC BLOOD PRESSURE: 164 MMHG | DIASTOLIC BLOOD PRESSURE: 110 MMHG | WEIGHT: 206 LBS

## 2022-06-09 DIAGNOSIS — N20.0 NEPHROLITHIASIS: ICD-10-CM

## 2022-06-09 DIAGNOSIS — N18.32 STAGE 3B CHRONIC KIDNEY DISEASE (HCC): ICD-10-CM

## 2022-06-09 DIAGNOSIS — R60.0 LEG EDEMA: ICD-10-CM

## 2022-06-09 DIAGNOSIS — N17.9 AKI (ACUTE KIDNEY INJURY) (HCC): Primary | ICD-10-CM

## 2022-06-09 DIAGNOSIS — N18.30 BENIGN HYPERTENSION WITH CKD (CHRONIC KIDNEY DISEASE) STAGE III (HCC): ICD-10-CM

## 2022-06-09 DIAGNOSIS — I12.9 BENIGN HYPERTENSION WITH CKD (CHRONIC KIDNEY DISEASE) STAGE III (HCC): ICD-10-CM

## 2022-06-09 PROCEDURE — 99214 OFFICE O/P EST MOD 30 MIN: CPT | Performed by: INTERNAL MEDICINE

## 2022-06-09 RX ORDER — DOXAZOSIN 2 MG/1
2 TABLET ORAL
Qty: 30 TABLET | Refills: 5 | Status: SHIPPED | OUTPATIENT
Start: 2022-06-09

## 2022-06-09 RX ORDER — AMLODIPINE BESYLATE 5 MG/1
5 TABLET ORAL 2 TIMES DAILY
Qty: 60 TABLET | Refills: 0 | Status: SHIPPED | OUTPATIENT
Start: 2022-06-09
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
## 2022-06-10 ENCOUNTER — TELEPHONE (OUTPATIENT)
Dept: CARDIOLOGY CLINIC | Facility: CLINIC | Age: 57
End: 2022-06-10

## 2022-06-10 ENCOUNTER — OFFICE VISIT (OUTPATIENT)
Dept: CARDIOLOGY CLINIC | Facility: CLINIC | Age: 57
End: 2022-06-10
Payer: COMMERCIAL

## 2022-06-10 ENCOUNTER — IN-CLINIC DEVICE VISIT (OUTPATIENT)
Dept: CARDIOLOGY CLINIC | Facility: CLINIC | Age: 57
End: 2022-06-10
Payer: COMMERCIAL

## 2022-06-10 VITALS
SYSTOLIC BLOOD PRESSURE: 140 MMHG | HEART RATE: 107 BPM | HEIGHT: 67 IN | DIASTOLIC BLOOD PRESSURE: 90 MMHG | BODY MASS INDEX: 32.43 KG/M2 | WEIGHT: 206.6 LBS

## 2022-06-10 DIAGNOSIS — I48.91 ATRIAL FIBRILLATION, UNSPECIFIED TYPE (HCC): ICD-10-CM

## 2022-06-10 DIAGNOSIS — N18.30 BENIGN HYPERTENSION WITH CKD (CHRONIC KIDNEY DISEASE) STAGE III (HCC): Primary | ICD-10-CM

## 2022-06-10 DIAGNOSIS — Z95.810 AICD (AUTOMATIC CARDIOVERTER/DEFIBRILLATOR) PRESENT: Primary | ICD-10-CM

## 2022-06-10 DIAGNOSIS — I42.2 HYPERTROPHIC CARDIOMYOPATHY (HCC): ICD-10-CM

## 2022-06-10 DIAGNOSIS — Z95.810 AICD (AUTOMATIC CARDIOVERTER/DEFIBRILLATOR) PRESENT: ICD-10-CM

## 2022-06-10 DIAGNOSIS — I47.2 VT (VENTRICULAR TACHYCARDIA) (HCC): ICD-10-CM

## 2022-06-10 DIAGNOSIS — I25.10 CORONARY ARTERIOSCLEROSIS: ICD-10-CM

## 2022-06-10 DIAGNOSIS — I12.9 BENIGN HYPERTENSION WITH CKD (CHRONIC KIDNEY DISEASE) STAGE III (HCC): Primary | ICD-10-CM

## 2022-06-10 PROCEDURE — 93000 ELECTROCARDIOGRAM COMPLETE: CPT | Performed by: INTERNAL MEDICINE

## 2022-06-10 PROCEDURE — 93283 PRGRMG EVAL IMPLANTABLE DFB: CPT | Performed by: INTERNAL MEDICINE

## 2022-06-10 PROCEDURE — 99214 OFFICE O/P EST MOD 30 MIN: CPT | Performed by: INTERNAL MEDICINE

## 2022-06-10 NOTE — LETTER
Cardiology Pre Operative Clearance      PRE OPERATIVE CARDIAC RISK ASSESSMENT    06/10/22    King Ziegler  1965  [id number]    Date of Surgery 06/23/2022     Type of Surgery: Foot Surgery    Surgeon: Dr Ashley Coppola    No Cardiac Contraindication for Planned Surgical Procedures    Anticoagulation: yes    Hold Xarelto three days prior to procedure    Physician Comment:  Call if question    Electronically Signed:  Judith Lopez MD

## 2022-06-10 NOTE — PATIENT INSTRUCTIONS
Call if problem in the interim  Continue your current medication  Hold Xarelto three days prior to procedure

## 2022-06-10 NOTE — PROGRESS NOTES
Results for orders placed or performed in visit on 06/10/22   Cardiac EP device report    Narrative    MDT-DUAL CHAMBER ICD (AAI-DDD MODE) - [address] [place] [address]  INTERROGATED IN THE [facility] OFFICE W/DR CHARLES OV: BATTERY VOLTAGE ADEQUATE-2 3 YRS  AP 50%  0%  ALL AVAILABLE LEAD PARAMETERS & TRENDS WITHIN NORMAL LIMITS  1,315 AHRS NOTED; 58% BURDEN  PT ON AMIO, METO SUCC, XARELTO  EF 55% (2022)  NOTED TO HAVE HIGH V RATES IN EVENING VIA TRENDS  OPTI-VOL WITHIN NORMAL LIMITS  NO PROGRAMMING CHANGES MADE TO DEVICE PARAMETERS  NORMAL DEVICE FUNCTION   NC

## 2022-06-12 ENCOUNTER — HOSPITAL ENCOUNTER (OUTPATIENT)
Dept: ULTRASOUND IMAGING | Facility: HOSPITAL | Age: 57
Discharge: HOME | End: 2022-06-12
Attending: INTERNAL MEDICINE
Payer: COMMERCIAL

## 2022-06-12 DIAGNOSIS — N18.32 STAGE 3B CHRONIC KIDNEY DISEASE (HCC): ICD-10-CM

## 2022-06-12 DIAGNOSIS — N17.9 AKI (ACUTE KIDNEY INJURY) (HCC): ICD-10-CM

## 2022-06-12 PROCEDURE — 76770 US EXAM ABDO BACK WALL COMP: CPT

## 2022-06-16 ENCOUNTER — TELEPHONE (OUTPATIENT)
Dept: NEPHROLOGY | Facility: CLINIC | Age: 57
End: 2022-06-16

## 2022-06-16 NOTE — TELEPHONE ENCOUNTER
Please let patient know that there are several left kidney cyst which could be simple versus mildly complex  No acute intervention needed for now  She will need repeat renal ultrasound in six months    I will give her script for renal ultrasound during her office visit in August

## 2022-06-16 NOTE — TELEPHONE ENCOUNTER
Spoke with patient and advised: Please let patient know that there are several left kidney cyst which could be simple versus mildly complex  No acute intervention needed for now  She will need repeat renal ultrasound in six months  I will give her script for renal ultrasound during her office visit in August    No questions at this time

## 2022-06-17 ENCOUNTER — OFFICE VISIT (OUTPATIENT)
Dept: CARDIOLOGY CLINIC | Facility: CLINIC | Age: 57
End: 2022-06-17
Payer: COMMERCIAL

## 2022-06-17 ENCOUNTER — APPOINTMENT (EMERGENCY)
Dept: RADIOLOGY | Facility: HOSPITAL | Age: 57
DRG: 175 | End: 2022-06-17
Payer: COMMERCIAL

## 2022-06-17 ENCOUNTER — HOSPITAL ENCOUNTER (INPATIENT)
Facility: HOSPITAL | Age: 57
LOS: 6 days | Discharge: HOME | DRG: 175 | End: 2022-06-23
Attending: EMERGENCY MEDICINE | Admitting: INTERNAL MEDICINE
Payer: COMMERCIAL

## 2022-06-17 VITALS
HEART RATE: 130 BPM | SYSTOLIC BLOOD PRESSURE: 118 MMHG | WEIGHT: 204.7 LBS | DIASTOLIC BLOOD PRESSURE: 68 MMHG | BODY MASS INDEX: 32.13 KG/M2 | HEIGHT: 67 IN

## 2022-06-17 DIAGNOSIS — I48.92 ATRIAL FLUTTER, UNSPECIFIED TYPE (HCC): Primary | ICD-10-CM

## 2022-06-17 DIAGNOSIS — I48.92 ATRIAL FLUTTER WITH RAPID VENTRICULAR RESPONSE (HCC): ICD-10-CM

## 2022-06-17 DIAGNOSIS — I48.4 ATYPICAL ATRIAL FLUTTER (HCC): ICD-10-CM

## 2022-06-17 DIAGNOSIS — R00.2 PALPITATIONS: ICD-10-CM

## 2022-06-17 DIAGNOSIS — K57.32 DIVERTICULITIS OF LARGE INTESTINE WITHOUT PERFORATION OR ABSCESS, UNSPECIFIED BLEEDING STATUS: ICD-10-CM

## 2022-06-17 DIAGNOSIS — I50.20 HEART FAILURE WITH REDUCED EJECTION FRACTION (HCC): ICD-10-CM

## 2022-06-17 DIAGNOSIS — Z95.810 AICD (AUTOMATIC CARDIOVERTER/DEFIBRILLATOR) PRESENT: ICD-10-CM

## 2022-06-17 LAB
2HR DELTA HS TROPONIN: -1 NG/L
4HR DELTA HS TROPONIN: -1 NG/L
ANION GAP SERPL CALCULATED.3IONS-SCNC: 6 MMOL/L (ref 4–13)
BASOPHILS # BLD AUTO: 0.02 THOUSANDS/ΜL (ref 0–0.1)
BASOPHILS NFR BLD AUTO: 1 % (ref 0–1)
BUN SERPL-MCNC: 19 MG/DL (ref 5–25)
CALCIUM SERPL-MCNC: 8.9 MG/DL (ref 8.3–10.1)
CARDIAC TROPONIN I PNL SERPL HS: 34 NG/L
CARDIAC TROPONIN I PNL SERPL HS: 34 NG/L
CARDIAC TROPONIN I PNL SERPL HS: 35 NG/L
CHLORIDE SERPL-SCNC: 110 MMOL/L (ref 100–108)
CO2 SERPL-SCNC: 25 MMOL/L (ref 21–32)
CREAT SERPL-MCNC: 2.01 MG/DL (ref 0.6–1.3)
EOSINOPHIL # BLD AUTO: 0.05 THOUSAND/ΜL (ref 0–0.61)
EOSINOPHIL NFR BLD AUTO: 1 % (ref 0–6)
ERYTHROCYTE [DISTWIDTH] IN BLOOD BY AUTOMATED COUNT: 15.7 % (ref 11.6–15.1)
GFR SERPL CREATININE-BSD FRML MDRD: 26 ML/MIN/1.73SQ M
GLUCOSE SERPL-MCNC: 79 MG/DL (ref 65–140)
HCT VFR BLD AUTO: 36.8 % (ref 34.8–46.1)
HGB BLD-MCNC: 11.5 G/DL (ref 11.5–15.4)
IMM GRANULOCYTES # BLD AUTO: 0.02 THOUSAND/UL (ref 0–0.2)
IMM GRANULOCYTES NFR BLD AUTO: 1 % (ref 0–2)
LYMPHOCYTES # BLD AUTO: 1.05 THOUSANDS/ΜL (ref 0.6–4.47)
LYMPHOCYTES NFR BLD AUTO: 26 % (ref 14–44)
MCH RBC QN AUTO: 27.1 PG (ref 26.8–34.3)
MCHC RBC AUTO-ENTMCNC: 31.3 G/DL (ref 31.4–37.4)
MCV RBC AUTO: 87 FL (ref 82–98)
MONOCYTES # BLD AUTO: 0.44 THOUSAND/ΜL (ref 0.17–1.22)
MONOCYTES NFR BLD AUTO: 11 % (ref 4–12)
NEUTROPHILS # BLD AUTO: 2.54 THOUSANDS/ΜL (ref 1.85–7.62)
NEUTS SEG NFR BLD AUTO: 60 % (ref 43–75)
NRBC BLD AUTO-RTO: 0 /100 WBCS
PLATELET # BLD AUTO: 133 THOUSANDS/UL (ref 149–390)
PMV BLD AUTO: 10.8 FL (ref 8.9–12.7)
POTASSIUM SERPL-SCNC: 3.8 MMOL/L (ref 3.5–5.3)
RBC # BLD AUTO: 4.25 MILLION/UL (ref 3.81–5.12)
SODIUM SERPL-SCNC: 141 MMOL/L (ref 136–145)
WBC # BLD AUTO: 4.12 THOUSAND/UL (ref 4.31–10.16)

## 2022-06-17 PROCEDURE — 99291 CRITICAL CARE FIRST HOUR: CPT | Performed by: EMERGENCY MEDICINE

## 2022-06-17 PROCEDURE — 36415 COLL VENOUS BLD VENIPUNCTURE: CPT

## 2022-06-17 PROCEDURE — 85025 COMPLETE CBC W/AUTO DIFF WBC: CPT

## 2022-06-17 PROCEDURE — 99223 1ST HOSP IP/OBS HIGH 75: CPT | Performed by: INTERNAL MEDICINE

## 2022-06-17 PROCEDURE — 93005 ELECTROCARDIOGRAM TRACING: CPT

## 2022-06-17 PROCEDURE — G1004 CDSM NDSC: HCPCS

## 2022-06-17 PROCEDURE — 84484 ASSAY OF TROPONIN QUANT: CPT

## 2022-06-17 PROCEDURE — 99214 OFFICE O/P EST MOD 30 MIN: CPT | Performed by: PHYSICIAN ASSISTANT

## 2022-06-17 PROCEDURE — 80048 BASIC METABOLIC PNL TOTAL CA: CPT

## 2022-06-17 PROCEDURE — 70450 CT HEAD/BRAIN W/O DYE: CPT

## 2022-06-17 PROCEDURE — 71046 X-RAY EXAM CHEST 2 VIEWS: CPT

## 2022-06-17 PROCEDURE — 84484 ASSAY OF TROPONIN QUANT: CPT | Performed by: INTERNAL MEDICINE

## 2022-06-17 PROCEDURE — 93000 ELECTROCARDIOGRAM COMPLETE: CPT | Performed by: PHYSICIAN ASSISTANT

## 2022-06-17 PROCEDURE — 99285 EMERGENCY DEPT VISIT HI MDM: CPT

## 2022-06-17 PROCEDURE — 96375 TX/PRO/DX INJ NEW DRUG ADDON: CPT

## 2022-06-17 PROCEDURE — 96374 THER/PROPH/DIAG INJ IV PUSH: CPT

## 2022-06-17 RX ORDER — ACETAMINOPHEN 325 MG/1
650 TABLET ORAL EVERY 6 HOURS PRN
Status: DISCONTINUED | OUTPATIENT
Start: 2022-06-17 | End: 2022-06-17

## 2022-06-17 RX ORDER — POTASSIUM CHLORIDE 20 MEQ/1
20 TABLET, EXTENDED RELEASE ORAL ONCE
Status: COMPLETED | OUTPATIENT
Start: 2022-06-17 | End: 2022-06-17

## 2022-06-17 RX ORDER — NICOTINE 21 MG/24HR
1 PATCH, TRANSDERMAL 24 HOURS TRANSDERMAL DAILY
Status: DISCONTINUED | OUTPATIENT
Start: 2022-06-18 | End: 2022-06-23 | Stop reason: HOSPADM

## 2022-06-17 RX ORDER — ACETAMINOPHEN 325 MG/1
650 TABLET ORAL EVERY 6 HOURS PRN
Status: DISCONTINUED | OUTPATIENT
Start: 2022-06-17 | End: 2022-06-23 | Stop reason: HOSPADM

## 2022-06-17 RX ORDER — ONDANSETRON 2 MG/ML
4 INJECTION INTRAMUSCULAR; INTRAVENOUS EVERY 6 HOURS PRN
Status: DISCONTINUED | OUTPATIENT
Start: 2022-06-17 | End: 2022-06-23 | Stop reason: HOSPADM

## 2022-06-17 RX ORDER — DILTIAZEM HYDROCHLORIDE 5 MG/ML
20 INJECTION INTRAVENOUS ONCE
Status: COMPLETED | OUTPATIENT
Start: 2022-06-17 | End: 2022-06-17

## 2022-06-17 RX ORDER — METOPROLOL TARTRATE 5 MG/5ML
5 INJECTION INTRAVENOUS ONCE
Status: COMPLETED | OUTPATIENT
Start: 2022-06-17 | End: 2022-06-17

## 2022-06-17 RX ORDER — METOCLOPRAMIDE HYDROCHLORIDE 5 MG/ML
10 INJECTION INTRAMUSCULAR; INTRAVENOUS EVERY 8 HOURS SCHEDULED
Status: DISCONTINUED | OUTPATIENT
Start: 2022-06-17 | End: 2022-06-17

## 2022-06-17 RX ORDER — AMIODARONE HYDROCHLORIDE 200 MG/1
200 TABLET ORAL DAILY
Status: DISCONTINUED | OUTPATIENT
Start: 2022-06-18 | End: 2022-06-23

## 2022-06-17 RX ORDER — DOXAZOSIN 2 MG/1
2 TABLET ORAL
Status: DISCONTINUED | OUTPATIENT
Start: 2022-06-17 | End: 2022-06-23 | Stop reason: HOSPADM

## 2022-06-17 RX ORDER — METOCLOPRAMIDE HYDROCHLORIDE 5 MG/ML
10 INJECTION INTRAMUSCULAR; INTRAVENOUS ONCE
Status: COMPLETED | OUTPATIENT
Start: 2022-06-17 | End: 2022-06-17

## 2022-06-17 RX ORDER — DOCUSATE SODIUM 100 MG/1
100 CAPSULE, LIQUID FILLED ORAL 2 TIMES DAILY PRN
Status: DISCONTINUED | OUTPATIENT
Start: 2022-06-17 | End: 2022-06-23 | Stop reason: HOSPADM

## 2022-06-17 RX ORDER — DIPHENHYDRAMINE HYDROCHLORIDE 50 MG/ML
25 INJECTION INTRAMUSCULAR; INTRAVENOUS EVERY 8 HOURS PRN
Status: DISCONTINUED | OUTPATIENT
Start: 2022-06-17 | End: 2022-06-17

## 2022-06-17 RX ORDER — FUROSEMIDE 20 MG/1
20 TABLET ORAL DAILY
Status: DISCONTINUED | OUTPATIENT
Start: 2022-06-18 | End: 2022-06-23 | Stop reason: HOSPADM

## 2022-06-17 RX ORDER — MAGNESIUM HYDROXIDE/ALUMINUM HYDROXICE/SIMETHICONE 120; 1200; 1200 MG/30ML; MG/30ML; MG/30ML
30 SUSPENSION ORAL EVERY 6 HOURS PRN
Status: DISCONTINUED | OUTPATIENT
Start: 2022-06-17 | End: 2022-06-23 | Stop reason: HOSPADM

## 2022-06-17 RX ORDER — SODIUM CHLORIDE 9 MG/ML
150 INJECTION, SOLUTION INTRAVENOUS CONTINUOUS
Status: DISCONTINUED | OUTPATIENT
Start: 2022-06-17 | End: 2022-06-18

## 2022-06-17 RX ORDER — AMLODIPINE BESYLATE 5 MG/1
5 TABLET ORAL 2 TIMES DAILY
Status: DISCONTINUED | OUTPATIENT
Start: 2022-06-17 | End: 2022-06-19

## 2022-06-17 RX ADMIN — DILTIAZEM HYDROCHLORIDE 2.5 MG/HR: 5 INJECTION INTRAVENOUS at 21:48

## 2022-06-17 RX ADMIN — SODIUM CHLORIDE 150 ML/HR: 0.9 INJECTION, SOLUTION INTRAVENOUS at 21:37

## 2022-06-17 RX ADMIN — AMLODIPINE BESYLATE 5 MG: 5 TABLET ORAL at 21:33

## 2022-06-17 RX ADMIN — DOXAZOSIN 2 MG: 2 TABLET ORAL at 21:33

## 2022-06-17 RX ADMIN — METOPROLOL TARTRATE 5 MG: 1 INJECTION, SOLUTION INTRAVENOUS at 18:10

## 2022-06-17 RX ADMIN — ACETAMINOPHEN 650 MG: 325 TABLET, FILM COATED ORAL at 21:33

## 2022-06-17 RX ADMIN — DILTIAZEM HYDROCHLORIDE 20 MG: 5 INJECTION, SOLUTION INTRAVENOUS at 19:38

## 2022-06-17 RX ADMIN — POTASSIUM CHLORIDE 20 MEQ: 1500 TABLET, EXTENDED RELEASE ORAL at 21:33

## 2022-06-17 RX ADMIN — METOPROLOL SUCCINATE 150 MG: 100 TABLET, EXTENDED RELEASE ORAL at 21:33

## 2022-06-17 RX ADMIN — SODIUM CHLORIDE 150 ML/HR: 0.9 INJECTION, SOLUTION INTRAVENOUS at 19:40

## 2022-06-17 RX ADMIN — METOCLOPRAMIDE HYDROCHLORIDE 10 MG: 5 INJECTION INTRAMUSCULAR; INTRAVENOUS at 19:42

## 2022-06-17 NOTE — ED PROVIDER NOTES
Final Diagnosis:  1  Atrial flutter, unspecified type (HCC)    2  Palpitations    3  Atrial flutter with rapid ventricular response [facility])      Chief Complaint   Patient presents with    Rapid Heart Rate     Pt presents ambulatory with c/o rapid heart rate  Sent from cardiologist for atrial flutter       History of Present Illness   This is a 62 y o  female PMH significant for HCM, atrial fibrillation status post ablation, coming in today with complaint of palpitations  She reports this started earlier today  She is seeing had her cardiologist Dr Darlene Dowell office this morning who noted her to be in A flutter  She was sent here for further evaluation  Patient herself reports he feels palpitations, however denies any nausea, vomiting, chest pain, diaphoresis  She also reports associated headache which started 2 days ago  It has improved today  She was reportedly nauseous had some light sensitivity  Denies any focal neurological deficits such as difficulty speaking swallowing, unilateral weakness, vision changes  Denies any episode of syncope  Reports she still smokes tobacco, however denies any other alcohol or drug use  Denies any recent infections, fevers, chills, rashes  No other complaints at this time     - No language barrier    - History obtained from patient and chart   - Reviewed and documented relevant past medical/family/social history  - There are no limitations to the history obtained  - Previous charting was reviewed  Some data reviewed included below for ease of access whether or not it is relevant to this patient encounter        Procedures             Past Medical, Past Surgical History:    has a past medical history of ACS (acute coronary syndrome) ([facility] [address] ) (2/7/2021), Arrhythmia, Arthritis, Atrial fibrillation ([facility] [address] ), Atrial fibrillation with rapid ventricular response ([facility] [address] ) (3/20/2016), Breast lump, CKD (chronic kidney disease) stage 3, GFR 30-59 ml/min ([address] ), Disease of thyroid gland, 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 ([facility] [address] ) (5/25/2020), GERD (gastroesophageal reflux disease), H/O transfusion (1987), Hepatitis C, Hepatitis C, Hyperlipidemia, Hypertension, Irregular heart beat, Pacemaker, Sleep apnea, and Tachycardia  has a past surgical history that includes Cholecystectomy; Knee surgery (Left); Replacement total knee (Left); Hysterectomy; Elbow surgery; pr esophagogastroduodenoscopy transoral diagnostic (N/A, 5/2/2018); Colonoscopy (12/21/2015); Cardiac pacemaker placement (2016); pr laryngoscopy,dirct,op scop,exc tumr (N/A, 8/10/2018); Throat surgery; Cardiac defibrillator placement; Cardiac catheterization (01/07/2019); Total hip arthroplasty; Knee surgery; Joint replacement (Left, 2015); Joint replacement (2/6/216 ); Skin lesion excision (10/20/2017); Nevus excision (10/20/2017); IR image guided aspiration / drainage (6/17/2020); Eye surgery; Colon surgery; US guidance (6/11/2018); and US guidance (6/11/2018)  Allergies: Allergies   Allergen Reactions    Coconut Oil - Food Allergy     Iodinated Diagnostic Agents Hives    Tape  [Medical Tape] Hives    Tramadol Hives and Rash       Social and Family History:     Social History     Substance and Sexual Activity   Alcohol Use Never     Social History     Tobacco Use   Smoking Status Current Every Day Smoker    Packs/day: 0 50    Years: 35 00    Pack years: 17 50    Types: Cigarettes   Smokeless Tobacco Never Used     Social History     Substance and Sexual Activity   Drug Use Never    Frequency: 1 0 times per week    Types: Marijuana       Review of Systems:   Review of Systems   Constitutional: Negative for chills and fever  HENT: Negative for ear pain and sore throat  Eyes: Negative for pain and visual disturbance  Respiratory: Negative for cough and shortness of breath  Cardiovascular: Positive for palpitations  Negative for chest pain     Gastrointestinal: Negative for abdominal pain and vomiting  Genitourinary: Negative for dysuria  Musculoskeletal: Negative for back pain  Skin: Negative for rash  Neurological: Positive for headaches  Negative for syncope  All other systems reviewed and are negative  Physical Examination     Vitals:    06/17/22 2113 06/17/22 2133 06/17/22 2210 06/17/22 2228   BP: 141/100 141/100 133/98 133/92   BP Location:       Pulse: (!) 129 (!) 122 (!) 129 (!) 131   Resp:  16 15    Temp: 98 2 °F (36 8 °C) 98 2 °F (36 8 °C) 98 6 °F (37 °C) 98 2 °F (36 8 °C)   TempSrc:  Oral     SpO2: 94%  93% 92%   Weight:  92 9 kg (204 lb 12 9 oz)     Height:  5' 7" (1 702 m)       Vitals reviewed by me  Physical Exam  Vitals and nursing note reviewed  Constitutional:       General: She is not in acute distress  Appearance: She is well-developed  HENT:      Head: Normocephalic and atraumatic  Eyes:      Extraocular Movements: Extraocular movements intact  Conjunctiva/sclera: Conjunctivae normal       Pupils: Pupils are equal, round, and reactive to light  Cardiovascular:      Rate and Rhythm: Regular rhythm  Tachycardia present  Heart sounds: No murmur heard  Pulmonary:      Effort: Pulmonary effort is normal  No respiratory distress  Breath sounds: Normal breath sounds  No wheezing or rhonchi  Abdominal:      Palpations: Abdomen is soft  Tenderness: There is no abdominal tenderness  There is no guarding or rebound  Musculoskeletal:      Cervical back: Neck supple  Right lower leg: No edema  Left lower leg: No edema  Skin:     General: Skin is warm and dry  Neurological:      General: No focal deficit present  Mental Status: She is alert  Mental status is at baseline  Cranial Nerves: No cranial nerve deficit  Motor: No weakness     Psychiatric:         Mood and Affect: Mood normal             Risk Stratification Tools                ED Course as of 06/18/22 0126   Fri Jun 17, 2022   1808 EKG shows atrial flutter rate of 130s, no ST or T wave abnormalities, normal intervals, normal axis, no other abnormalities      1842 CXR shows no evidence of acute cardiopulmonary disease   1901 hs TnI 0hr: 35   1945 HR in the 90s, will get the pt admitted     CT head without contrast   Final Result      No acute intracranial abnormality  Workstation performed: [id number]         XR chest 2 views   Final Result      Mild emphysematous changes and subtle bibasilar atelectasis noted without pneumothorax or lobar airspace consolidation  Workstation performed: [id number]           Orders Placed This Encounter   Procedures    Critical Care    XR chest 2 views    CT head without contrast    CBC and differential    Basic metabolic panel    HS Troponin 0hr (reflex protocol)    HS Troponin I 2hr    HS Troponin I 4hr    TSH, 3rd generation    Comprehensive metabolic panel    Magnesium    Phosphorus    CBC and differential    Protime-INR    APTT    Diet NPO; Sips with meds    Insert peripheral IV    Nursing communication ECG 12 lead with chest pain or ST segment change and notify MD  Place an ECG order if performed      48 Hour Telemetry Monitoring    Up and OOB as tolerated    Apply SCD only    Level 1-Full Code: all life saving measures are indicated    Inpatient consult to Electrophysiology    Inpatient consult to Case Management    ECG 12 lead    ECG 12 lead with 2nd Troponin    ECG 12 lead with 3rd Troponin    Inpatient Admission       Labs:     Labs Reviewed   CBC AND DIFFERENTIAL - Abnormal       Result Value Ref Range Status    WBC 4 12 (*) 4 31 - 10 16 Thousand/uL Final    RBC 4 25  3 81 - 5 12 Million/uL Final    Hemoglobin 11 5  11 5 - 15 4 g/dL Final    Hematocrit 36 8  34 8 - 46 1 % Final    MCV 87  82 - 98 fL Final    MCH 27 1  26 8 - 34 3 pg Final    MCHC 31 3 (*) 31 4 - 37 4 g/dL Final    RDW 15 7 (*) 11 6 - 15 1 % Final    MPV 10 8  8 9 - 12 7 fL Final    Platelets 618 (*) 149 - 390 Thousands/uL Final    nRBC 0  /100 WBCs Final    Neutrophils Relative 60  43 - 75 % Final    Immat GRANS % 1  0 - 2 % Final    Lymphocytes Relative 26  14 - 44 % Final    Monocytes Relative 11  4 - 12 % Final    Eosinophils Relative 1  0 - 6 % Final    Basophils Relative 1  0 - 1 % Final    Neutrophils Absolute 2 54  1 85 - 7 62 Thousands/µL Final    Immature Grans Absolute 0 02  0 00 - 0 20 Thousand/uL Final    Lymphocytes Absolute 1 05  0 60 - 4 47 Thousands/µL Final    Monocytes Absolute 0 44  0 17 - 1 22 Thousand/µL Final    Eosinophils Absolute 0 05  0 00 - 0 61 Thousand/µL Final    Basophils Absolute 0 02  0 00 - 0 10 Thousands/µL Final   BASIC METABOLIC PANEL - Abnormal    Sodium 141  136 - 145 mmol/L Final    Potassium 3 8  3 5 - 5 3 mmol/L Final    Chloride 110 (*) 100 - 108 mmol/L Final    CO2 25  21 - 32 mmol/L Final    ANION GAP 6  4 - 13 mmol/L Final    BUN 19  5 - 25 mg/dL Final    Creatinine 2 01 (*) 0 60 - 1 30 mg/dL Final    Comment: Standardized to IDMS reference method    Glucose 79  65 - 140 mg/dL Final    Comment: If the patient is fasting, the ADA then defines impaired fasting glucose as > 100 mg/dL and diabetes as > or equal to 123 mg/dL  Specimen collection should occur prior to Sulfasalazine administration due to the potential for falsely depressed results  Specimen collection should occur prior to Sulfapyridine administration due to the potential for falsely elevated results      Calcium 8 9  8 3 - 10 1 mg/dL Final    eGFR 26  ml/min/1 73sq m Final    Narrative:     [place] guidelines for Chronic Kidney Disease (CKD):     Stage 1 with normal or high GFR (GFR > 90 mL/min/1 73 square meters)    Stage 2 Mild CKD (GFR = 60-89 mL/min/1 73 square meters)    Stage 3A Moderate CKD (GFR = 45-59 mL/min/1 73 square meters)    Stage 3B Moderate CKD (GFR = 30-44 mL/min/1 73 square meters)    Stage 4 Severe CKD (GFR = 15-29 mL/min/1 73 square meters)    Stage 5 End Stage CKD (GFR <15 mL/min/1 73 square meters)  Note: GFR calculation is accurate only with a steady state creatinine   HS TROPONIN I 0HR - Normal    hs TnI 0hr 35  "Refer to ACS Flowchart"- see link ng/L Final    Comment:                                              Initial (time 0) result  If >=50 ng/L, Myocardial injury suggested ;  Type of myocardial injury and treatment strategy  to be determined  If 5-49 ng/L, a delta result at 2 hours and or 4 hours will be needed to further evaluate  If <4 ng/L, and chest pain has been >3 hours since onset, patient may qualify for discharge based on the HEART score in the ED  If <5 ng/L and <3hours since onset of chest pain, a delta result at 2 hours will be needed to further evaluate  HS Troponin 99th Percentile URL of a Health Population=12 ng/L with a 95% Confidence Interval of 8-18 ng/L  Second Troponin (time 2 hours)  If calculated delta >= 20 ng/L,  Myocardial injury suggested ; Type of myocardial injury and treatment strategy to be determined  If 5-49 ng/L and the calculated delta is 5-19 ng/L, consult medical service for evaluation  Continue evaluation for ischemia on ecg and other possible etiology and repeat hs troponin at 4 hours  If delta is <5 ng/L at 2 hours, consider discharge based on risk stratification via the HEART score (if in ED), or BRITTA risk score in IP/Observation  HS Troponin 99th Percentile URL of a Health Population=12 ng/L with a 95% Confidence Interval of 8-18 ng/L  Final Diagnosis:  1  Atrial flutter, unspecified type (HCC)    2  Palpitations    3  Atrial flutter with rapid ventricular response ([address] )        MDM:   King Ziegler is a 62 y o  who presents with complaints of palpitations    Vital signs are remarkable for tachycardia, physical exam shows tachycardia but otherwise benign exam     Assessment and plan: For the patient's heart rate, consistent with atrial flutter    Administered Lopressor 5 mg and will reassess  For the patient's headache, differential includes migraine versus SAH versus tension headache versus other abnormality  Will treat symptomatically and assess for improvement  Plan on admitting the patient for further cardiology evaluation  Management:  CBC, BMP, troponin, EKG, Reglan    ED Course as of 06/18/22 0126   Fri Jun 17, 2022 1808 EKG shows atrial flutter rate of 130s, no ST or T wave abnormalities, normal intervals, normal axis, no other abnormalities      1842 CXR shows no evidence of acute cardiopulmonary disease   1901 hs TnI 0hr: 35   1945 HR in the 90s, will get the pt admitted       Re-assessment: Pts workup remarkable for elevated trop  Her HR improved s/p Diltiazem  Given her uncontrolled Aflutter, opted to recommend admission  Discussed the patient's case with the [facility] service who agreed to admit the patient for further care and evaluation  The patient was also stable throughout their ED course and suffered from no acute changes and had no further questions or concerns from the ED team's standpoint        Final Dispo   Admit    Medications   sodium chloride 0 9 % infusion (150 mL/hr Intravenous New Bag 6/17/22 2137)   amiodarone tablet 200 mg (has no administration in time range)   amLODIPine (NORVASC) tablet 5 mg (5 mg Oral Given 6/17/22 2133)   doxazosin (CARDURA) tablet 2 mg (2 mg Oral Given 6/17/22 2133)   furosemide (LASIX) tablet 20 mg (has no administration in time range)   metoprolol succinate (TOPROL-XL) 24 hr tablet 150 mg (150 mg Oral Given 6/17/22 2133)   diltiazem (CARDIZEM) 125 mg in sodium chloride 0 9 % 125 mL infusion (5 mg/hr Intravenous Rate/Dose Change 6/18/22 0100)   docusate sodium (COLACE) capsule 100 mg (has no administration in time range)   ondansetron (ZOFRAN) injection 4 mg (has no administration in time range)   aluminum-magnesium hydroxide-simethicone (MYLANTA) oral suspension 30 mL (has no administration in time range)   nicotine (NICODERM CQ) 21 mg/24 hr TD 24 hr patch 1 patch (has no administration in time range)   acetaminophen (TYLENOL) tablet 650 mg (650 mg Oral Given 6/17/22 2133)   metoprolol (LOPRESSOR) injection 5 mg (5 mg Intravenous Given 6/17/22 1810)   diltiazem (CARDIZEM) injection 20 mg (20 mg Intravenous Given 6/17/22 1938)   metoclopramide (REGLAN) injection 10 mg (10 mg Intravenous Given 6/17/22 1942)   potassium chloride (K-DUR,KLOR-CON) CR tablet 20 mEq (20 mEq Oral Given 6/17/22 2133)     Time reflects when diagnosis was documented in both MDM as applicable and the Disposition within this note     Time User Action Codes Description Comment    6/17/2022  8:00 PM Delta Grooms Add [I48 92] Atrial flutter, unspecified type ([facility] [address] )     6/17/2022  8:24 PM Giancarlo Guitar Add [R00 2] Palpitations     6/17/2022  8:24 PM Giancarlo Conde Add [I48 92] Atrial flutter with rapid ventricular response [facility])       ED Disposition     ED Disposition   Admit    Condition   Stable    Date/Time   Fri Jun 17, 2022  8:24 PM    Comment   Case was discussed with JUDY and the patient's admission status was agreed to be Admission Status: inpatient status to the service of Dr Ladan Nguyen              Follow-up Information    None       Current Discharge Medication List      CONTINUE these medications which have NOT CHANGED    Details   amiodarone 200 mg tablet Take 1 tablet (200 mg total) by mouth daily  Qty: 30 tablet, Refills: 0    Associated Diagnoses: Paroxysmal A-fib (HCC)      amLODIPine (NORVASC) 5 mg tablet Take 1 tablet (5 mg total) by mouth 2 (two) times a day  Qty: 60 tablet, Refills: 0    Associated Diagnoses: Stage 3b chronic kidney disease (HCC)      doxazosin (CARDURA) 2 mg tablet Take 1 tablet (2 mg total) by mouth daily at bedtime  Qty: 30 tablet, Refills: 5    Associated Diagnoses: Benign hypertension with CKD (chronic kidney disease) stage III (HCC)      EPINEPHrine (EPIPEN) 0 3 mg/0 3 mL SOAJ Inject 0 3 mL (0 3 mg total) into a muscle once for 1 dose For severe allergic reaction  Qty: 1 each, Refills: 0    Associated Diagnoses: Allergic reaction to bee sting      furosemide (LASIX) 20 mg tablet take 1 tablet by mouth once daily  Qty: 30 tablet, Refills: 5    Associated Diagnoses: Leg swelling; Benign hypertension with CKD (chronic kidney disease) stage III ([facility])      metoprolol succinate (TOPROL-XL) 50 mg 24 hr tablet take 3 tablets by mouth twice a day  Qty: 180 tablet, Refills: 5    Associated Diagnoses: Paroxysmal A-fib ([facility])      rivaroxaban (XARELTO) 15 mg tablet Take 1 tablet (15 mg total) by mouth every evening  Qty: 30 tablet, Refills: 0    Associated Diagnoses: Electrolyte abnormality; Stage 3b chronic kidney disease ([facility])      sacubitril-valsartan (Entresto) 49-51 MG TABS Take 1 tablet by mouth 2 (two) times a day  Qty: 60 tablet, Refills: 0    Associated Diagnoses: Heart failure with reduced ejection fraction ([facility])      sucralfate (CARAFATE) 1 g tablet Take 1 tablet (1 g total) by mouth 4 (four) times a day (before meals and at bedtime)  Qty: 120 tablet, Refills: 0    Associated Diagnoses: Epigastric abdominal tenderness           No discharge procedures on file  Prior to Admission Medications   Prescriptions Last Dose Informant Patient Reported? Taking?    EPINEPHrine (EPIPEN) 0 3 mg/0 3 mL SOAJ  Self No No   Sig: Inject 0 3 mL (0 3 mg total) into a muscle once for 1 dose For severe allergic reaction   amLODIPine (NORVASC) 5 mg tablet  Self No No   Sig: Take 1 tablet (5 mg total) by mouth 2 (two) times a day   amiodarone 200 mg tablet  Self No No   Sig: Take 1 tablet (200 mg total) by mouth daily   doxazosin (CARDURA) 2 mg tablet  Self No No   Sig: Take 1 tablet (2 mg total) by mouth daily at bedtime   furosemide (LASIX) 20 mg tablet  Self No No   Sig: take 1 tablet by mouth once daily   metoprolol succinate (TOPROL-XL) 50 mg 24 hr tablet  Self No No   Sig: take 3 tablets by mouth twice a day   rivaroxaban (XARELTO) 15 mg tablet  Self No No Sig: Take 1 tablet (15 mg total) by mouth every evening   sacubitril-valsartan (Entresto) 49-51 MG TABS  Self No No   Sig: Take 1 tablet by mouth 2 (two) times a day   Patient not taking: No sig reported   sucralfate (CARAFATE) 1 g tablet   No No   Sig: Take 1 tablet (1 g total) by mouth 4 (four) times a day (before meals and at bedtime)   Patient not taking: No sig reported      Facility-Administered Medications: None       Code Status: Level 1 - Full Code    I discussed all pertinent results and aspects of the care plan with the patient and/or present family members  Answered all questions and addressed all concerns  They expressed agreement and had no further concerns  Portions of the record may have been created with voice recognition software  Occasional wrong word or "sound a like" substitutions may have occurred due to the inherent limitations of voice recognition software  Read the chart carefully and recognize, using context, where substitutions have occurred  The attending physician physically available and evaluated the above patient alongside myself       Van Mackay MD  06/18/22 [id number]
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
## 2022-06-17 NOTE — ED ATTENDING ATTESTATION
6/17/2022  I, Wil Farmer DO, saw and evaluated the patient  I have discussed the patient with the resident/non-physician practitioner and agree with the resident's/non-physician practitioner's findings, Plan of Care, and MDM as documented in the resident's/non-physician practitioner's note, except where noted  All available labs and Radiology studies were reviewed  I was present for key portions of any procedure(s) performed by the resident/non-physician practitioner and I was immediately available to provide assistance  At this point I agree with the current assessment done in the Emergency Department  I have conducted an independent evaluation of this patient a history and physical is as follows:    57F with atrial flutter sent from cardiologist for admission and cardioversion  On xarelto  Mild chest discomfort  No SOB or diaphoresis  Past Medical History:   Diagnosis Date    ACS (acute coronary syndrome) ([facility] ) 2/7/2021    Arrhythmia     Arthritis     Atrial fibrillation (HCC)     Atrial fibrillation with rapid ventricular response (HCC) 3/20/2016    Breast lump     CKD (chronic kidney disease) stage 3, GFR 30-59 ml/min (HCC)     Disease of thyroid gland     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 ([facility] ) 5/25/2020    GERD (gastroesophageal reflux disease)     H/O transfusion 1987    Hepatitis C     resolved    Hepatitis C     Hyperlipidemia     Hypertension     Irregular heart beat     Pacemaker     Sleep apnea     no cpap    Tachycardia      BP (!) 170/110   Pulse (!) 135   Temp 98 9 °F (37 2 °C) (Oral)   Resp 20   SpO2 100%   NAD, A&Ox4, tachy/regular, abd soft/NT ext NT w/o edema     Rate control, cardiac eval, admit               ED Course         Critical Care Time  CriticalCare Time  Performed by: Wil Farmer DO  Authorized by: Wil Farmer DO     Critical care provider statement:     Critical care time (minutes): 37    Critical care time was exclusive of:  Separately billable procedures and treating other patients and teaching time    Critical care was necessary to treat or prevent imminent or life-threatening deterioration of the following conditions:  Cardiac failure    Critical care was time spent personally by me on the following activities:  Obtaining history from patient or surrogate, development of treatment plan with patient or surrogate, discussions with consultants, evaluation of patient's response to treatment, examination of patient, review of old charts, re-evaluation of patient's condition, ordering and review of radiographic studies, ordering and review of laboratory studies and ordering and performing treatments and interventions

## 2022-06-18 ENCOUNTER — APPOINTMENT (INPATIENT)
Dept: NON INVASIVE DIAGNOSTICS | Facility: HOSPITAL | Age: 57
DRG: 175 | End: 2022-06-18
Payer: COMMERCIAL

## 2022-06-18 PROBLEM — D72.819 LEUCOPENIA: Status: ACTIVE | Noted: 2022-06-18

## 2022-06-18 LAB
ALBUMIN SERPL BCP-MCNC: 3.2 G/DL (ref 3.5–5)
ALP SERPL-CCNC: 53 U/L (ref 46–116)
ALT SERPL W P-5'-P-CCNC: 18 U/L (ref 12–78)
ANION GAP SERPL CALCULATED.3IONS-SCNC: 7 MMOL/L (ref 4–13)
APTT PPP: 33 SECONDS (ref 23–37)
AST SERPL W P-5'-P-CCNC: 17 U/L (ref 5–45)
BASOPHILS # BLD AUTO: 0.02 THOUSANDS/ΜL (ref 0–0.1)
BASOPHILS NFR BLD AUTO: 1 % (ref 0–1)
BILIRUB SERPL-MCNC: 0.4 MG/DL (ref 0.2–1)
BUN SERPL-MCNC: 17 MG/DL (ref 5–25)
CALCIUM ALBUM COR SERPL-MCNC: 9.3 MG/DL (ref 8.3–10.1)
CALCIUM SERPL-MCNC: 8.7 MG/DL (ref 8.3–10.1)
CHLORIDE SERPL-SCNC: 111 MMOL/L (ref 100–108)
CO2 SERPL-SCNC: 22 MMOL/L (ref 21–32)
CREAT SERPL-MCNC: 1.66 MG/DL (ref 0.6–1.3)
EOSINOPHIL # BLD AUTO: 0.09 THOUSAND/ΜL (ref 0–0.61)
EOSINOPHIL NFR BLD AUTO: 2 % (ref 0–6)
ERYTHROCYTE [DISTWIDTH] IN BLOOD BY AUTOMATED COUNT: 15.6 % (ref 11.6–15.1)
GFR SERPL CREATININE-BSD FRML MDRD: 33 ML/MIN/1.73SQ M
GLUCOSE SERPL-MCNC: 88 MG/DL (ref 65–140)
HCT VFR BLD AUTO: 35.7 % (ref 34.8–46.1)
HGB BLD-MCNC: 11.2 G/DL (ref 11.5–15.4)
IMM GRANULOCYTES # BLD AUTO: 0.01 THOUSAND/UL (ref 0–0.2)
IMM GRANULOCYTES NFR BLD AUTO: 0 % (ref 0–2)
INR PPP: 1.14 (ref 0.84–1.19)
LYMPHOCYTES # BLD AUTO: 1.36 THOUSANDS/ΜL (ref 0.6–4.47)
LYMPHOCYTES NFR BLD AUTO: 35 % (ref 14–44)
MAGNESIUM SERPL-MCNC: 1.9 MG/DL (ref 1.6–2.6)
MCH RBC QN AUTO: 26.5 PG (ref 26.8–34.3)
MCHC RBC AUTO-ENTMCNC: 31.4 G/DL (ref 31.4–37.4)
MCV RBC AUTO: 84 FL (ref 82–98)
MONOCYTES # BLD AUTO: 0.26 THOUSAND/ΜL (ref 0.17–1.22)
MONOCYTES NFR BLD AUTO: 7 % (ref 4–12)
NEUTROPHILS # BLD AUTO: 2.15 THOUSANDS/ΜL (ref 1.85–7.62)
NEUTS SEG NFR BLD AUTO: 55 % (ref 43–75)
NRBC BLD AUTO-RTO: 0 /100 WBCS
PHOSPHATE SERPL-MCNC: 3.1 MG/DL (ref 2.7–4.5)
PLATELET # BLD AUTO: 116 THOUSANDS/UL (ref 149–390)
PMV BLD AUTO: 11.3 FL (ref 8.9–12.7)
POTASSIUM SERPL-SCNC: 3.8 MMOL/L (ref 3.5–5.3)
PROT SERPL-MCNC: 7.5 G/DL (ref 6.4–8.2)
PROTHROMBIN TIME: 14.1 SECONDS (ref 11.6–14.5)
RBC # BLD AUTO: 4.23 MILLION/UL (ref 3.81–5.12)
SODIUM SERPL-SCNC: 140 MMOL/L (ref 136–145)
TSH SERPL DL<=0.05 MIU/L-ACNC: 8.28 UIU/ML (ref 0.45–4.5)
WBC # BLD AUTO: 3.89 THOUSAND/UL (ref 4.31–10.16)

## 2022-06-18 PROCEDURE — 85730 THROMBOPLASTIN TIME PARTIAL: CPT | Performed by: INTERNAL MEDICINE

## 2022-06-18 PROCEDURE — 85025 COMPLETE CBC W/AUTO DIFF WBC: CPT | Performed by: INTERNAL MEDICINE

## 2022-06-18 PROCEDURE — 84443 ASSAY THYROID STIM HORMONE: CPT | Performed by: INTERNAL MEDICINE

## 2022-06-18 PROCEDURE — 84100 ASSAY OF PHOSPHORUS: CPT | Performed by: INTERNAL MEDICINE

## 2022-06-18 PROCEDURE — 80053 COMPREHEN METABOLIC PANEL: CPT | Performed by: INTERNAL MEDICINE

## 2022-06-18 PROCEDURE — 99232 SBSQ HOSP IP/OBS MODERATE 35: CPT | Performed by: FAMILY MEDICINE

## 2022-06-18 PROCEDURE — 85610 PROTHROMBIN TIME: CPT | Performed by: INTERNAL MEDICINE

## 2022-06-18 PROCEDURE — 99222 1ST HOSP IP/OBS MODERATE 55: CPT | Performed by: INTERNAL MEDICINE

## 2022-06-18 PROCEDURE — 83735 ASSAY OF MAGNESIUM: CPT | Performed by: INTERNAL MEDICINE

## 2022-06-18 RX ORDER — BUTALBITAL, ACETAMINOPHEN AND CAFFEINE 50; 325; 40 MG/1; MG/1; MG/1
1 TABLET ORAL EVERY 4 HOURS PRN
Status: DISCONTINUED | OUTPATIENT
Start: 2022-06-18 | End: 2022-06-23 | Stop reason: HOSPADM

## 2022-06-18 RX ORDER — TRAMADOL HYDROCHLORIDE 50 MG/1
50 TABLET ORAL ONCE
Status: COMPLETED | OUTPATIENT
Start: 2022-06-18 | End: 2022-06-18

## 2022-06-18 RX ADMIN — METOPROLOL SUCCINATE 150 MG: 100 TABLET, EXTENDED RELEASE ORAL at 08:47

## 2022-06-18 RX ADMIN — AMLODIPINE BESYLATE 5 MG: 5 TABLET ORAL at 08:47

## 2022-06-18 RX ADMIN — DOXAZOSIN 2 MG: 2 TABLET ORAL at 21:13

## 2022-06-18 RX ADMIN — RIVAROXABAN 15 MG: 15 TABLET, FILM COATED ORAL at 17:16

## 2022-06-18 RX ADMIN — NICOTINE 1 PATCH: 21 PATCH, EXTENDED RELEASE TRANSDERMAL at 08:47

## 2022-06-18 RX ADMIN — BUTALBITAL, ACETAMINOPHEN, AND CAFFEINE 1 TABLET: 50; 325; 40 TABLET ORAL at 10:54

## 2022-06-18 RX ADMIN — DILTIAZEM HYDROCHLORIDE 7.5 MG/HR: 5 INJECTION INTRAVENOUS at 14:56

## 2022-06-18 RX ADMIN — FUROSEMIDE 20 MG: 20 TABLET ORAL at 08:47

## 2022-06-18 RX ADMIN — AMLODIPINE BESYLATE 5 MG: 5 TABLET ORAL at 17:16

## 2022-06-18 RX ADMIN — ONDANSETRON 4 MG: 2 INJECTION INTRAMUSCULAR; INTRAVENOUS at 15:20

## 2022-06-18 RX ADMIN — AMIODARONE HYDROCHLORIDE 200 MG: 200 TABLET ORAL at 08:47

## 2022-06-18 RX ADMIN — SODIUM CHLORIDE 150 ML/HR: 0.9 INJECTION, SOLUTION INTRAVENOUS at 04:21

## 2022-06-18 RX ADMIN — ACETAMINOPHEN 650 MG: 325 TABLET, FILM COATED ORAL at 21:14

## 2022-06-18 RX ADMIN — METOPROLOL SUCCINATE 150 MG: 100 TABLET, EXTENDED RELEASE ORAL at 17:16

## 2022-06-18 RX ADMIN — TRAMADOL HYDROCHLORIDE 50 MG: 50 TABLET, COATED ORAL at 13:33

## 2022-06-18 RX ADMIN — ACETAMINOPHEN 650 MG: 325 TABLET, FILM COATED ORAL at 06:11

## 2022-06-18 NOTE — PROGRESS NOTES
[address] [facility]  Progress Note - Andie Rhodes 1965, 62 y o  female MRN: [id number]  Unit/Bed#: [facility] 402-01 Encounter: [id number]  Primary Care Provider: Vanesa Cohen MD   Date and time admitted to hospital: 6/17/2022  5:52 PM    * Atypical atrial flutter [facility])  Assessment & Plan  Patient sent from Cardiology office due to atrial flutter  As per cardiology progress note patient noted to be in a flutter since 4/22  Given 1 dose of metoprolol in emergency room with not much improvement; diltiazem 20 mg have decreased heart rate from 135 to currently 110 and will continue with a drip  Telemetry  Troponins remained negative  Potassium at 3 8; will give KCl 20 mEq to raise potassium level above 4  Electrophysiology consult appreciated  Patient is scheduled for EPS/flutter ablation early next week  Continue with Xarelto     Benign hypertension with CKD (chronic kidney disease) stage III [facility])  Assessment & Plan  Lab Results   Component Value Date    EGFR 33 06/18/2022    EGFR 26 06/17/2022    EGFR 27 04/08/2022    CREATININE 1 66 (H) 06/18/2022    CREATININE 2 01 (H) 06/17/2022    CREATININE 1 95 (H) 04/08/2022   Patient was on IV fluids given previous history of tachy induced cardiomyopathy be will discontinue IV fluids  Monitor creatinine    Nicotine dependence  Assessment & Plan  On nicotine replacement  Gastroesophageal reflux disease without esophagitis  Assessment & Plan  Continue with Mylanta as needed  VTE Pharmacologic Prophylaxis:   Pharmacologic: Rivaroxaban (Xarelto)  Mechanical VTE Prophylaxis in Place: Yes    Patient Centered Rounds: I have performed bedside rounds with nursing staff today  Discussions with Specialists or Other Care Team Provider:     Education and Discussions with Family / Patient: Melany Kendall updated over the phone    Time Spent for Care: 30 minutes    More than 50% of total time spent on counseling and coordination of care as described above  Current Length of Stay: 1 day(s)    Current Patient Status: Inpatient   Certification Statement: The patient will continue to require additional inpatient hospital stay due to Pending ablation a early next    Discharge Plan:     Code Status: Level 1 - Full Code      Subjective:   Patient seen and examined  Patient reported that he is having her headache  Patient was kept NPO since midnight for possible the ablation  Discussed with electrophysiology and diet was started  Objective:     Vitals:   Temp (24hrs), Av 1 °F (36 7 °C), Min:97 4 °F (36 3 °C), Max:98 9 °F (37 2 °C)    Temp:  [97 4 °F (36 3 °C)-98 9 °F (37 2 °C)] 98 2 °F (36 8 °C)  HR:  [] 99  Resp:  [15-20] 17  BP: (103-170)/() 148/86  SpO2:  [89 %-100 %] 94 %  Body mass index is 32 08 kg/m²  Input and Output Summary (last 24 hours): Intake/Output Summary (Last 24 hours) at 2022 1444  Last data filed at 2022 1334  Gross per 24 hour   Intake 1627 33 ml   Output 900 ml   Net 727 33 ml       Physical Exam:     Physical Exam  Constitutional:       General: She is not in acute distress  HENT:      Head: Normocephalic and atraumatic  Nose: Nose normal    Eyes:      General: No scleral icterus  Cardiovascular:      Rate and Rhythm: Rhythm irregular  Pulses: Normal pulses  Heart sounds: Normal heart sounds  Pulmonary:      Effort: Pulmonary effort is normal       Breath sounds: Normal breath sounds  Abdominal:      General: Abdomen is flat  There is no distension  Musculoskeletal:         General: Normal range of motion  Cervical back: Normal range of motion and neck supple  Skin:     General: Skin is warm  Coloration: Skin is not jaundiced  Neurological:      General: No focal deficit present  Mental Status: She is alert           Additional Data:     Labs:    Results from last 7 days   Lab Units 22  0456   WBC Thousand/uL 3 89*   HEMOGLOBIN g/dL 11 2* HEMATOCRIT % 35 7   PLATELETS Thousands/uL 116*   NEUTROS PCT % 55   LYMPHS PCT % 35   MONOS PCT % 7   EOS PCT % 2     Results from last 7 days   Lab Units 06/18/22  0456   POTASSIUM mmol/L 3 8   CHLORIDE mmol/L 111*   CO2 mmol/L 22   BUN mg/dL 17   CREATININE mg/dL 1 66*   CALCIUM mg/dL 8 7   ALK PHOS U/L 53   ALT U/L 18   AST U/L 17     Results from last 7 days   Lab Units 06/18/22  0456   INR  1 14       * I Have Reviewed All Lab Data Listed Above  * Additional Pertinent Lab Tests Reviewed: Shirlene 66 Admission Reviewed    Imaging:    Imaging Reports Reviewed Today Include:   Imaging Personally Reviewed by Myself Includes:      Recent Cultures (last 7 days):           Last 24 Hours Medication List:   Current Facility-Administered Medications   Medication Dose Route Frequency Provider Last Rate    acetaminophen  650 mg Oral Q6H PRN Amanda Mata MD      aluminum-magnesium hydroxide-simethicone  30 mL Oral Q6H PRN Amanda Mata MD      amiodarone  200 mg Oral Daily Amanda Mata MD      amLODIPine  5 mg Oral BID Amanda Mata MD      butalbital-acetaminophen-caffeine  1 tablet Oral Q4H PRN Leonardo Christopher MD      diltiazem  1-15 mg/hr Intravenous Titrated Amanda Mata MD 7 5 mg/hr (06/18/22 0935)    docusate sodium  100 mg Oral BID PRN Amanda Mata MD      doxazosin  2 mg Oral HS Amanda Mata MD      furosemide  20 mg Oral Daily Amanda Mata MD      metoprolol succinate  150 mg Oral BID Amanda Mata MD      nicotine  1 patch Transdermal Daily Amanda Mata MD      ondansetron  4 mg Intravenous Q6H PRN Amanda Mata MD      rivaroxaban  15 mg Oral Daily With Dinner Cynthia Watkins PA-C          Today, Patient Was Seen By: Leonardo Christopher MD    ** Please Note: Dictation voice to text software may have been used in the creation of this document   **

## 2022-06-18 NOTE — ASSESSMENT & PLAN NOTE
Lab Results   Component Value Date    EGFR 26 06/17/2022    EGFR 27 04/08/2022    EGFR 22 04/07/2022    CREATININE 2 01 (H) 06/17/2022    CREATININE 1 95 (H) 04/08/2022    CREATININE 2 34 (H) 04/07/2022
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
## 2022-06-18 NOTE — ASSESSMENT & PLAN NOTE
Lab Results   Component Value Date    EGFR 33 06/18/2022    EGFR 26 06/17/2022    EGFR 27 04/08/2022    CREATININE 1 66 (H) 06/18/2022    CREATININE 2 01 (H) 06/17/2022    CREATININE 1 95 (H) 04/08/2022   Patient was on IV fluids given previous history of tachy induced cardiomyopathy be will discontinue IV fluids  Monitor creatinine

## 2022-06-18 NOTE — ASSESSMENT & PLAN NOTE
Patient sent from Cardiology office due to atrial flutter; will place NPO right now  Given 1 dose of metoprolol with not much improvement; diltiazem 20 mg have decreased heart rate from 135 to currently 110 and will continue with a drip  Telemetry  Troponins x3  Potassium at 3 8; will give KCl 20 mEq to raise potassium level above 4  Cardiology consult  Repeat metabolic profile with CBC in the morning  NPO post midnight

## 2022-06-18 NOTE — CASE MANAGEMENT
Case Management Assessment & Discharge Planning Note    Patient name Eunice Barnett  Location [address] [place] [address] 402/[facility] 545-76 MRN [id number]  : 1965 Date 2022       Current Admission Date: 2022  Current Admission Diagnosis:Atypical atrial flutter ([facility] )   Patient Active Problem List    Diagnosis Date Noted    Leg edema 2022    Benign hypertension with CKD (chronic kidney disease) stage III ([facility] ) 2022    Nephrolithiasis 2022    Hypertensive crisis 2022    Electrolyte abnormality 2022    CKD (chronic kidney disease) stage 3, GFR 30-59 ml/min ([facility] ) 2022    [facility] ) 2022    Hypertensive urgency 2022    Hepatitis C 2022    Acute right flank pain 08/15/2021    Epigastric abdominal tenderness 08/15/2021    Thrombocytopenia ([facility] ) 2021    Leg swelling 2020    Paroxysmal A-fib ([facility] ) 2020    Cocaine abuse ([facility] ) 2020    Acute on chronic combined systolic and diastolic congestive heart failure ([facility] ) 2020    Elevated lipase 10/10/2019    Palpitations 2019    Elevated troponin 2019    Hypertrophic cardiomyopathy ([facility] ) 2019    DANIELA (acute kidney injury) ([facility] ) 2018    Nicotine dependence 2018    NSTEMI (non-ST elevated myocardial infarction) ([facility] ) 2018    ICD (implantable cardioverter-defibrillator) discharge 05/10/2017    History of ventricular tachycardia ([facility] ) with ICD 2016    Chest pain at rest 2016    Hyperlipidemia 2016    Uncontrolled hypertension 2016    Gastroesophageal reflux disease without esophagitis 2016      LOS (days): 1  Geometric Mean LOS (GMLOS) (days):   Days to GMLOS:     OBJECTIVE:    Risk of Unplanned Readmission Score: 31 53         Current admission status: Inpatient  Referral Reason: VNA    Preferred Pharmacy:   [facility] [address] [place] [address], [address] [place], [place] [address]elene Drummer [address] [address]  Phone: [phone] Fax: [phone]    Primary Care Provider: Mason Genao MD    Primary Insurance: [address] [facility]  Secondary Insurance:     ASSESSMENT:  Hector Whitmore, [address] Representative - Spouse   Primary Phone: [phone] (Mobile)  Home Phone: [phone]               Advance Directives  Does patient have a [address]?: No  Was patient offered paperwork?: Yes (declined)  Does patient currently have a Health Care decision maker?: Yes, please see Health Care Proxy section  Does patient have Advance Directives?: No  Was patient offered paperwork?: Yes (declined)  Primary Contact: Rebecca Lyons         Readmission Root Cause  30 Day Readmission: No    Patient Information  Admitted from[de-identified] Home  Mental Status: Alert  During Assessment patient was accompanied by: Not accompanied during assessment  Assessment information provided by[de-identified] Patient  Primary Caregiver: Self  Support Systems: Self, Spouse/significant other, Daughter  [place] of Residence: [address] [place] [address] do you live in?: [place] entry access options   Select all that apply : Stairs  Number of steps to enter home : 3  Do the steps have railings?: Yes  Type of Current Residence: [address] home  Upon entering residence, is there a bedroom on the main floor (no further steps)?: No  A bedroom is located on the following floor levels of residence (select all that apply):: 2nd Floor  Upon entering residence, is there a bathroom on the main floor (no further steps)?: Yes  Number of steps to 2nd floor from main floor: One Flight  In the last 12 months, was there a time when you were not able to pay the mortgage or rent on time?: No  In the last 12 months, how many places have you lived?: 1  In the last 12 months, was there a time when you did not have a steady place to sleep or slept in a shelter (including now)?: No  Homeless/housing insecurity resource given?: N/A  Living Arrangements: Lives w/ Spouse/significant other, Lives w/ Daughter  Is patient a ?: No    Activities of Daily Living Prior to Admission  Functional Status: Independent  Completes ADLs independently?: Yes  Ambulates independently?: Yes  Does patient use assisted devices?: No  Does patient currently own DME?: Yes  What DME does the patient currently own?: Bedside Commode, Rollator, Walker, Straight Cane  Does patient have a history of Outpatient Therapy (PT/OT)?: Yes  Does the patient have a history of Short-Term Rehab?: No  Does patient have a history of HHC?: Yes  Does patient currently have [facility]?: No         Patient Information Continued  Income Source: Employed  Does patient have prescription coverage?: Yes  Within the past 12 months, you worried that your food would run out before you got the money to buy more : Never true  Within the past 12 months, the food you bought just didn't last and you didn't have money to get more : Never true  Food insecurity resource given?: N/A  Does patient receive dialysis treatments?: No  Does patient have a history of substance abuse?: No  Does patient have a history of Mental Health Diagnosis?: No         Means of Transportation  Means of Transport to Appts[de-identified] Drives Self  In the past 12 months, has lack of transportation kept you from medical appointments or from getting medications?: No  In the past 12 months, has lack of transportation kept you from meetings, work, or from getting things needed for daily living?: No  Was application for public transport provided?: N/A        DISCHARGE DETAILS:    Discharge planning discussed with[de-identified] Patient  Freedom of Choice: Yes  Comments - Freedom of Choice: discussed  CM contacted family/caregiver?: No- see comments (pt indpt in her room)  Were Treatment Team discharge recommendations reviewed with patient/caregiver?: Yes  Did patient/caregiver verbalize understanding of patient care needs?: Yes       Contacts  Reason/Outcome: Discharge [address]         Is the patient interested in [address] at discharge?: No (pending recommendations)    DME Referral Provided  Referral made for DME?: No    Other Referral/Resources/Interventions Provided:  Interventions: HHC  Referral Comments: Pt may need HHC upon DC  Discharge Destination Plan[de-identified] Home, Home with [place] at Discharge : Family                                      Additional Comments: Cm met with pt at bedside  Pt reports that she lives with her  and dtr in a [address] with 3 dino  Pts bedroom is on the second flr with 1 flight of stairs to access  PT has a rollator, cane, walker and BSC but she does not require any of these devices  Pt states that she has had [address] before a long time ago but she does not recall the agency  She has participated in OP PT with [facility]  She has no hx of STR/DA/MH  Pt works part time but states she may apply for disablility  Her PCP is through [facility] and she uses [employer]   Pt is on [employer] PTA
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
## 2022-06-18 NOTE — ASSESSMENT & PLAN NOTE
Patient sent from Cardiology office due to atrial flutter  As per cardiology progress note patient noted to be in a flutter since 4/22  Given 1 dose of metoprolol in emergency room with not much improvement; diltiazem 20 mg have decreased heart rate from 135 to currently 110 and will continue with a drip  Telemetry  Troponins remained negative  Potassium at 3 8; will give KCl 20 mEq to raise potassium level above 4  Electrophysiology consult appreciated    Patient is scheduled for EPS/flutter ablation early next week  Continue with Xarelto

## 2022-06-19 ENCOUNTER — APPOINTMENT (INPATIENT)
Dept: NON INVASIVE DIAGNOSTICS | Facility: HOSPITAL | Age: 57
DRG: 175 | End: 2022-06-19
Payer: COMMERCIAL

## 2022-06-19 PROCEDURE — 99232 SBSQ HOSP IP/OBS MODERATE 35: CPT | Performed by: FAMILY MEDICINE

## 2022-06-19 PROCEDURE — 93308 TTE F-UP OR LMTD: CPT

## 2022-06-19 RX ORDER — SUMATRIPTAN 50 MG/1
50 TABLET, FILM COATED ORAL ONCE
Status: COMPLETED | OUTPATIENT
Start: 2022-06-19 | End: 2022-06-19

## 2022-06-19 RX ORDER — TRAMADOL HYDROCHLORIDE 50 MG/1
50 TABLET ORAL EVERY 6 HOURS PRN
Status: DISCONTINUED | OUTPATIENT
Start: 2022-06-19 | End: 2022-06-22

## 2022-06-19 RX ORDER — DIGOXIN 0.25 MG/ML
250 INJECTION INTRAMUSCULAR; INTRAVENOUS ONCE
Status: COMPLETED | OUTPATIENT
Start: 2022-06-19 | End: 2022-06-19

## 2022-06-19 RX ADMIN — METOPROLOL SUCCINATE 150 MG: 100 TABLET, EXTENDED RELEASE ORAL at 08:22

## 2022-06-19 RX ADMIN — SUMATRIPTAN SUCCINATE 50 MG: 50 TABLET ORAL at 00:39

## 2022-06-19 RX ADMIN — TRAMADOL HYDROCHLORIDE 50 MG: 50 TABLET, COATED ORAL at 10:05

## 2022-06-19 RX ADMIN — RIVAROXABAN 15 MG: 15 TABLET, FILM COATED ORAL at 17:05

## 2022-06-19 RX ADMIN — AMLODIPINE BESYLATE 5 MG: 5 TABLET ORAL at 08:22

## 2022-06-19 RX ADMIN — TRAMADOL HYDROCHLORIDE 50 MG: 50 TABLET, COATED ORAL at 23:05

## 2022-06-19 RX ADMIN — BUTALBITAL, ACETAMINOPHEN, AND CAFFEINE 1 TABLET: 50; 325; 40 TABLET ORAL at 07:19

## 2022-06-19 RX ADMIN — TRAMADOL HYDROCHLORIDE 50 MG: 50 TABLET, COATED ORAL at 17:05

## 2022-06-19 RX ADMIN — DIGOXIN 250 MCG: 0.25 INJECTION INTRAMUSCULAR; INTRAVENOUS at 09:27

## 2022-06-19 RX ADMIN — PERFLUTREN 0.4 ML/MIN: 6.52 INJECTION, SUSPENSION INTRAVENOUS at 08:13

## 2022-06-19 RX ADMIN — AMIODARONE HYDROCHLORIDE 200 MG: 200 TABLET ORAL at 08:23

## 2022-06-19 RX ADMIN — METOPROLOL SUCCINATE 150 MG: 100 TABLET, EXTENDED RELEASE ORAL at 17:05

## 2022-06-19 RX ADMIN — NICOTINE 1 PATCH: 21 PATCH, EXTENDED RELEASE TRANSDERMAL at 08:23

## 2022-06-19 RX ADMIN — FUROSEMIDE 20 MG: 20 TABLET ORAL at 08:23

## 2022-06-19 RX ADMIN — DOXAZOSIN 2 MG: 2 TABLET ORAL at 21:24

## 2022-06-19 RX ADMIN — ACETAMINOPHEN 650 MG: 325 TABLET, FILM COATED ORAL at 21:24

## 2022-06-19 RX ADMIN — ONDANSETRON 4 MG: 2 INJECTION INTRAMUSCULAR; INTRAVENOUS at 18:28

## 2022-06-19 NOTE — ASSESSMENT & PLAN NOTE
Patient sent from Cardiology office due to atrial flutter  As per cardiology progress note patient noted to be in a flutter since 4/22  Given 1 dose of metoprolol in emergency room with not much improvement; diltiazem 20 mg have decreased heart rate from 135 to currently 110 and will continue with a drip  Telemetry  Troponins remained negative  Potassium at 3 8; will give KCl 20 mEq to raise potassium level above 4  Electrophysiology consult appreciated    Patient is scheduled for EPS/flutter ablation early next week-Monday or tuesday  Continue with Xarelto

## 2022-06-19 NOTE — PROGRESS NOTES
[address] [facility]  Progress Note - Eunice Barnett 1965, 62 y o  female MRN: [id number]  Unit/Bed#: [facility] 402-01 Encounter: [id number]  Primary Care Provider: Brenton Goddard MD   Date and time admitted to hospital: 6/17/2022  5:52 PM    * Atypical atrial flutter [facility])  Assessment & Plan  Patient sent from Cardiology office due to atrial flutter  As per cardiology progress note patient noted to be in a flutter since 4/22  Given 1 dose of metoprolol in emergency room with not much improvement; diltiazem 20 mg have decreased heart rate from 135 to currently 110 and will continue with a drip  Telemetry  Troponins remained negative  Potassium at 3 8; will give KCl 20 mEq to raise potassium level above 4  Electrophysiology consult appreciated  Patient is scheduled for EPS/flutter ablation early next week-Monday or tuesday  Continue with Xarelto     Leucopenia  Assessment & Plan  Monitor for now    Benign hypertension with CKD (chronic kidney disease) stage III [facility])  Assessment & Plan  Lab Results   Component Value Date    EGFR 33 06/18/2022    EGFR 26 06/17/2022    EGFR 27 04/08/2022    CREATININE 1 66 (H) 06/18/2022    CREATININE 2 01 (H) 06/17/2022    CREATININE 1 95 (H) 04/08/2022   Patient was on IV fluids given previous history of tachy induced cardiomyopathy be will discontinue IV fluids  Monitor creatinine    CKD (chronic kidney disease) stage 3, GFR 30-59 ml/min [facility])  Assessment & Plan  Lab Results   Component Value Date    EGFR 33 06/18/2022    EGFR 26 06/17/2022    EGFR 27 04/08/2022    CREATININE 1 66 (H) 06/18/2022    CREATININE 2 01 (H) 06/17/2022    CREATININE 1 95 (H) 04/08/2022   Monitor creatinine    Cocaine abuse ([facility] [address] )  Assessment & Plan  Recent UDS was positive for cocaine in April of this year    Nicotine dependence  Assessment & Plan  On nicotine replacement  Headache  Assessment & Plan  · P r n   Tramadol/Fioricet    Gastroesophageal reflux disease without esophagitis  Assessment & Plan  Continue with Mylanta as needed  VTE Pharmacologic Prophylaxis:   Pharmacologic: Rivaroxaban (Xarelto)  Mechanical VTE Prophylaxis in Place: Yes    Patient Centered Rounds: I have performed bedside rounds with nursing staff today  Discussions with Specialists or Other Care Team Provider:     Education and Discussions with Family / Patient:  Patient  Called   left voicemail    Time Spent for Care: 30 minutes  More than 50% of total time spent on counseling and coordination of care as described above  Current Length of Stay: 2 day(s)    Current Patient Status: Inpatient   Certification Statement: The patient will continue to require additional inpatient hospital stay due to Pending ablation either Monday or Tuesday    Discharge Plan:     Code Status: Level 1 - Full Code      Subjective:   Patient seen and examined  No events overnight  Still with headache-reported that tramadol helped yesterday    Objective:     Vitals:   Temp (24hrs), Av 8 °F (36 6 °C), Min:97 5 °F (36 4 °C), Max:98 5 °F (36 9 °C)    Temp:  [97 5 °F (36 4 °C)-98 5 °F (36 9 °C)] 97 6 °F (36 4 °C)  HR:  [] 116  Resp:  [14-17] 16  BP: (116-165)/() 142/98  SpO2:  [90 %-98 %] 92 %  Body mass index is 31 95 kg/m²  Input and Output Summary (last 24 hours): Intake/Output Summary (Last 24 hours) at 2022 1123  Last data filed at 2022 0720  Gross per 24 hour   Intake 561 04 ml   Output 1050 ml   Net -488 96 ml       Physical Exam:     Physical Exam  Constitutional:       General: She is not in acute distress  HENT:      Head: Normocephalic and atraumatic  Nose: Nose normal    Eyes:      General: No scleral icterus  Cardiovascular:      Rate and Rhythm: Normal rate  Rhythm irregular  Heart sounds: Normal heart sounds  Pulmonary:      Effort: Pulmonary effort is normal       Breath sounds: Normal breath sounds     Abdominal:      General: Abdomen is flat    Musculoskeletal:         General: Normal range of motion  Skin:     General: Skin is warm  Neurological:      General: No focal deficit present  Mental Status: She is alert  Additional Data:     Labs:    Results from last 7 days   Lab Units 06/18/22  0456   WBC Thousand/uL 3 89*   HEMOGLOBIN g/dL 11 2*   HEMATOCRIT % 35 7   PLATELETS Thousands/uL 116*   NEUTROS PCT % 55   LYMPHS PCT % 35   MONOS PCT % 7   EOS PCT % 2     Results from last 7 days   Lab Units 06/18/22  0456   POTASSIUM mmol/L 3 8   CHLORIDE mmol/L 111*   CO2 mmol/L 22   BUN mg/dL 17   CREATININE mg/dL 1 66*   CALCIUM mg/dL 8 7   ALK PHOS U/L 53   ALT U/L 18   AST U/L 17     Results from last 7 days   Lab Units 06/18/22  0456   INR  1 14       * I Have Reviewed All Lab Data Listed Above  * Additional Pertinent Lab Tests Reviewed:  Shirl[address] Admission Reviewed    Imaging:    Imaging Reports Reviewed Today Include:   Imaging Personally Reviewed by Myself Includes:      Recent Cultures (last 7 days):           Last 24 Hours Medication List:   Current Facility-Administered Medications   Medication Dose Route Frequency Provider Last Rate    acetaminophen  650 mg Oral Q6H PRN Phuc Koch MD      aluminum-magnesium hydroxide-simethicone  30 mL Oral Q6H PRN Phuc Koch MD      amiodarone  200 mg Oral Daily Phuc Koch MD      butalbital-acetaminophen-caffeine  1 tablet Oral Q4H PRN Jaclyn Bryant MD      docusate sodium  100 mg Oral BID PRN Phuc Koch MD      doxazosin  2 mg Oral HS Phuc Koch MD      furosemide  20 mg Oral Daily Phuc Koch MD      metoprolol succinate  150 mg Oral BID Phuc Koch MD      nicotine  1 patch Transdermal Daily Phuc Koch MD      ondansetron  4 mg Intravenous Q6H PRN Phuc Koch MD      rivaroxaban  15 mg Oral Daily With CHARITY Olivera      traMADol  50 mg Oral Q6H PRN Jaclyn Bryant MD          Today, Patient Was Seen By: Joseph Pollard MD    ** Please Note: Dictation voice to text software may have been used in the creation of this document   **

## 2022-06-19 NOTE — ASSESSMENT & PLAN NOTE
Lab Results   Component Value Date    EGFR 33 06/18/2022    EGFR 26 06/17/2022    EGFR 27 04/08/2022    CREATININE 1 66 (H) 06/18/2022    CREATININE 2 01 (H) 06/17/2022    CREATININE 1 95 (H) 04/08/2022   Monitor creatinine

## 2022-06-20 ENCOUNTER — ANESTHESIA EVENT (INPATIENT)
Dept: NON INVASIVE DIAGNOSTICS | Facility: HOSPITAL | Age: 57
DRG: 175 | End: 2022-06-20
Payer: COMMERCIAL

## 2022-06-20 LAB
ANION GAP SERPL CALCULATED.3IONS-SCNC: 1 MMOL/L (ref 4–13)
AORTIC VALVE MEAN VELOCITY: 10.5 M/S
APICAL FOUR CHAMBER EJECTION FRACTION: 41 %
AV LVOT MEAN GRADIENT: 2 MMHG
AV LVOT PEAK GRADIENT: 5 MMHG
AV MEAN GRADIENT: 5 MMHG
AV PEAK GRADIENT: 11 MMHG
AV VELOCITY RATIO: 0.67
BUN SERPL-MCNC: 18 MG/DL (ref 5–25)
CALCIUM SERPL-MCNC: 8.6 MG/DL (ref 8.3–10.1)
CHLORIDE SERPL-SCNC: 109 MMOL/L (ref 100–108)
CO2 SERPL-SCNC: 28 MMOL/L (ref 21–32)
CREAT SERPL-MCNC: 1.66 MG/DL (ref 0.6–1.3)
DOP CALC AO PEAK VEL: 1.66 M/S
DOP CALC AO VTI: 25.08 CM
DOP CALC LVOT PEAK VEL VTI: 14.73 CM
DOP CALC LVOT PEAK VEL: 1.12 M/S
ERYTHROCYTE [DISTWIDTH] IN BLOOD BY AUTOMATED COUNT: 15.2 % (ref 11.6–15.1)
FRACTIONAL SHORTENING: 20 (ref 28–44)
GFR SERPL CREATININE-BSD FRML MDRD: 33 ML/MIN/1.73SQ M
GLUCOSE SERPL-MCNC: 78 MG/DL (ref 65–140)
HCT VFR BLD AUTO: 36.1 % (ref 34.8–46.1)
HGB BLD-MCNC: 11.1 G/DL (ref 11.5–15.4)
INTERVENTRICULAR SEPTUM IN DIASTOLE (PARASTERNAL SHORT AXIS VIEW): 2.9 CM
INTERVENTRICULAR SEPTUM: 2.9 CM (ref 0.6–1.1)
LEFT INTERNAL DIMENSION IN SYSTOLE: 3.3 CM (ref 2.1–4)
LEFT VENTRICLE DIASTOLIC VOLUME (MOD BIPLANE): 64 ML
LEFT VENTRICLE SYSTOLIC VOLUME (MOD BIPLANE): 37 ML
LEFT VENTRICULAR INTERNAL DIMENSION IN DIASTOLE: 4.1 CM (ref 3.5–6)
LEFT VENTRICULAR POSTERIOR WALL IN END DIASTOLE: 2.2 CM
LEFT VENTRICULAR STROKE VOLUME: 29 ML
LV EF: 42 %
LVSV (TEICH): 29 ML
MAGNESIUM SERPL-MCNC: 2 MG/DL (ref 1.6–2.6)
MCH RBC QN AUTO: 26.6 PG (ref 26.8–34.3)
MCHC RBC AUTO-ENTMCNC: 30.7 G/DL (ref 31.4–37.4)
MCV RBC AUTO: 86 FL (ref 82–98)
MV E'TISSUE VEL-LAT: 8 CM/S
MV E'TISSUE VEL-SEP: 7 CM/S
PLATELET # BLD AUTO: 120 THOUSANDS/UL (ref 149–390)
PMV BLD AUTO: 10.4 FL (ref 8.9–12.7)
POTASSIUM SERPL-SCNC: 4.3 MMOL/L (ref 3.5–5.3)
RA PRESSURE ESTIMATED: 15 MMHG
RBC # BLD AUTO: 4.18 MILLION/UL (ref 3.81–5.12)
RV PSP: 47 MMHG
SL CV LV EF: 45
SL CV PED ECHO LEFT VENTRICLE DIASTOLIC VOLUME (MOD BIPLANE) 2D: 74 ML
SL CV PED ECHO LEFT VENTRICLE SYSTOLIC VOLUME (MOD BIPLANE) 2D: 45 ML
SODIUM SERPL-SCNC: 138 MMOL/L (ref 136–145)
TR MAX PG: 32 MMHG
TR PEAK VELOCITY: 3 M/S
TRICUSPID VALVE PEAK REGURGITATION VELOCITY: 2.98 M/S
WBC # BLD AUTO: 3.14 THOUSAND/UL (ref 4.31–10.16)

## 2022-06-20 PROCEDURE — 99232 SBSQ HOSP IP/OBS MODERATE 35: CPT | Performed by: INTERNAL MEDICINE

## 2022-06-20 PROCEDURE — 93010 ELECTROCARDIOGRAM REPORT: CPT | Performed by: INTERNAL MEDICINE

## 2022-06-20 PROCEDURE — 93325 DOPPLER ECHO COLOR FLOW MAPG: CPT | Performed by: INTERNAL MEDICINE

## 2022-06-20 PROCEDURE — 80048 BASIC METABOLIC PNL TOTAL CA: CPT | Performed by: FAMILY MEDICINE

## 2022-06-20 PROCEDURE — 80307 DRUG TEST PRSMV CHEM ANLYZR: CPT | Performed by: PHYSICIAN ASSISTANT

## 2022-06-20 PROCEDURE — 93308 TTE F-UP OR LMTD: CPT | Performed by: INTERNAL MEDICINE

## 2022-06-20 PROCEDURE — 83735 ASSAY OF MAGNESIUM: CPT | Performed by: FAMILY MEDICINE

## 2022-06-20 PROCEDURE — 93321 DOPPLER ECHO F-UP/LMTD STD: CPT | Performed by: INTERNAL MEDICINE

## 2022-06-20 PROCEDURE — 99232 SBSQ HOSP IP/OBS MODERATE 35: CPT | Performed by: FAMILY MEDICINE

## 2022-06-20 PROCEDURE — 85027 COMPLETE CBC AUTOMATED: CPT | Performed by: FAMILY MEDICINE

## 2022-06-20 RX ORDER — CEFAZOLIN SODIUM 2 G/50ML
2000 SOLUTION INTRAVENOUS EVERY 8 HOURS
Status: DISCONTINUED | OUTPATIENT
Start: 2022-06-21 | End: 2022-06-21

## 2022-06-20 RX ADMIN — NICOTINE 1 PATCH: 21 PATCH, EXTENDED RELEASE TRANSDERMAL at 08:08

## 2022-06-20 RX ADMIN — FUROSEMIDE 20 MG: 20 TABLET ORAL at 08:07

## 2022-06-20 RX ADMIN — RIVAROXABAN 15 MG: 15 TABLET, FILM COATED ORAL at 16:57

## 2022-06-20 RX ADMIN — TRAMADOL HYDROCHLORIDE 50 MG: 50 TABLET, COATED ORAL at 17:45

## 2022-06-20 RX ADMIN — TRAMADOL HYDROCHLORIDE 50 MG: 50 TABLET, COATED ORAL at 11:38

## 2022-06-20 RX ADMIN — METOPROLOL SUCCINATE 150 MG: 100 TABLET, EXTENDED RELEASE ORAL at 17:01

## 2022-06-20 RX ADMIN — TRAMADOL HYDROCHLORIDE 50 MG: 50 TABLET, COATED ORAL at 05:07

## 2022-06-20 RX ADMIN — DOXAZOSIN 2 MG: 2 TABLET ORAL at 21:31

## 2022-06-20 RX ADMIN — AMIODARONE HYDROCHLORIDE 200 MG: 200 TABLET ORAL at 08:08

## 2022-06-20 RX ADMIN — METOPROLOL SUCCINATE 150 MG: 100 TABLET, EXTENDED RELEASE ORAL at 08:08

## 2022-06-20 NOTE — ANESTHESIA PREPROCEDURE EVALUATION
Procedure:  Cardiac eps/aflutter ablation (N/A Chest)    Relevant Problems   CARDIO   (+) Acute on chronic combined systolic and diastolic congestive heart failure (HCC)   (+) Atypical atrial flutter (HCC)   (+) Chest pain at rest   (+) History of ventricular tachycardia (HCC) with ICD   (+) Hyperlipidemia   (+) Hypertensive crisis   (+) Hypertensive urgency   (+) NSTEMI (non-ST elevated myocardial infarction) (HCC)   (+) Paroxysmal A-fib (HCC)   (+) Uncontrolled hypertension      GI/HEPATIC   (+) Gastroesophageal reflux disease without esophagitis   (+) Hepatitis C      /RENAL   (+) DANIELA (acute kidney injury) (HCC)   (+) Benign hypertension with CKD (chronic kidney disease) stage III (HCC)   (+) CKD (chronic kidney disease) stage 3, GFR 30-59 ml/min (HCC)   (+) Nephrolithiasis      HEMATOLOGY   (+) Thrombocytopenia (HCC)      NEURO/PSYCH   (+) Headache      Cardiovascular and Mediastinum   (+) Hypertrophic cardiomyopathy ([address] )      Other   (+) Cocaine abuse ([facility] [address] )   (+) ICD (implantable cardioverter-defibrillator) discharge   (+) Nicotine dependence      TTE 6/19/2022:    Left Ventricle: Left ventricular cavity size is normal  Wall thickness is severely increased  There is severe concentric hypertrophy (septal > posterior)  The left ventricular ejection fraction is 40-45%  Systolic function is mildly reduced  There is mild global hypokinesis with regional variation    Mitral Valve: There is mild regurgitation    Tricuspid Valve: There is mild regurgitation  The right ventricular systolic pressure is mildly elevated  The estimated right ventricular systolic pressure is 46 40 mmHg  Cardiac Device Check 6/10/2022:  MDT-DUAL CHAMBER ICD (AAI-DDD MODE) - ACTIVE SYSTEM IS MRI CONDITIONAL   DEVICE INTERROGATED IN THE [place] OFFICE W/DR CHARLES OV: BATTERY VOLTAGE ADEQUATE-2 3 YRS  AP 50%  0%  ALL AVAILABLE LEAD PARAMETERS & TRENDS WITHIN NORMAL LIMITS      Cardiac Cath 1/2019:  CORONARY VESSELS:   --  The coronary circulation is right dominant  --  Left main: Angiography showed minor luminal irregularities  --  LAD: Angiography showed minor luminal irregularities  --  Circumflex: Angiography showed minor luminal irregularities  --  RCA: Angiography showed minor luminal irregularities  Lab Results   Component Value Date    WBC 3 14 (L) 06/20/2022    HGB 11 1 (L) 06/20/2022    HCT 36 1 06/20/2022    MCV 86 06/20/2022     (L) 06/20/2022     Lab Results   Component Value Date    SODIUM 138 06/20/2022    K 4 3 06/20/2022     (H) 06/20/2022    CO2 28 06/20/2022    BUN 18 06/20/2022    CREATININE 1 66 (H) 06/20/2022    GLUC 78 06/20/2022    CALCIUM 8 6 06/20/2022     Lab Results   Component Value Date    INR 1 14 06/18/2022    INR 1 17 01/29/2022    INR 1 04 08/18/2021    PROTIME 14 1 06/18/2022    PROTIME 14 9 (H) 01/29/2022    PROTIME 13 7 08/18/2021     Lab Results   Component Value Date    HGBA1C 5 4 09/16/2020          Physical Exam    Airway    Mallampati score: II  TM Distance: >3 FB  Neck ROM: full     Dental       Cardiovascular      Pulmonary      Other Findings        Anesthesia Plan  ASA Score- 4     Anesthesia Type- general with ASA Monitors  Additional Monitors: arterial line  Airway Plan: ETT  Plan Factors-    Chart reviewed  EKG reviewed  Existing labs reviewed  Patient summary reviewed  Patient is not a current smoker  Patient did not smoke on day of surgery  Induction- intravenous  Postoperative Plan-   Planned trial extubation    Informed Consent- Anesthetic plan and risks discussed with patient  I personally reviewed this patient with the CRNA  Discussed and agreed on the Anesthesia Plan with the CRNA  Te Todd

## 2022-06-20 NOTE — PROGRESS NOTES
[address] [facility]  Progress Note - Norris Dong 1965, 62 y o  female MRN: [id number]  Unit/Bed#: [facility] 402-01 Encounter: [id number]  Primary Care Provider: Shauna Alex MD   Date and time admitted to hospital: 6/17/2022  5:52 PM    * Atypical atrial flutter [facility])  Assessment & Plan  Patient sent from Cardiology office due to atrial flutter  As per cardiology progress note patient noted to be in a flutter since 4/22  Patient was on Cardizem drip-discontinued  Telemetry  Troponins remained negative  Potassium at 3 8; will give KCl 20 mEq to raise potassium level above 4  Electrophysiology consult appreciated  Scheduled for ablation tomorrow   Continue with Xarelto - holding for the procedure in a m  Different of NSVT noted-discussed with electrophysiology possible aberrancy rather than true NSVT    Leucopenia  Assessment & Plan  Monitor for now    Benign hypertension with CKD (chronic kidney disease) stage III [facility])  Assessment & Plan  Lab Results   Component Value Date    EGFR 33 06/20/2022    EGFR 33 06/18/2022    EGFR 26 06/17/2022    CREATININE 1 66 (H) 06/20/2022    CREATININE 1 66 (H) 06/18/2022    CREATININE 2 01 (H) 06/17/2022   Patient was on IV fluids given previous history of tachy induced cardiomyopathy be will discontinue IV fluids  Monitor creatinine    CKD (chronic kidney disease) stage 3, GFR 30-59 ml/min [facility])  Assessment & Plan  Lab Results   Component Value Date    EGFR 33 06/20/2022    EGFR 33 06/18/2022    EGFR 26 06/17/2022    CREATININE 1 66 (H) 06/20/2022    CREATININE 1 66 (H) 06/18/2022    CREATININE 2 01 (H) 06/17/2022   Monitor creatinine    Cocaine abuse ([facility] [address] )  Assessment & Plan  Recent UDS was positive for cocaine in April of this year    Nicotine dependence  Assessment & Plan  On nicotine replacement  Headache  Assessment & Plan  · P r n   Tramadol/Fioricet    Gastroesophageal reflux disease without esophagitis  Assessment & Plan  Continue with Mylanta as needed  VTE Pharmacologic Prophylaxis:   Pharmacologic: Rivaroxaban (Xarelto)  Mechanical VTE Prophylaxis in Place: Yes    Patient Centered Rounds: I have performed bedside rounds with nursing staff today  Discussions with Specialists or Other Care Team Provider:     Education and Discussions with Family / Patient:  Called -updated over the phone    Time Spent for Care: 30 minutes  More than 50% of total time spent on counseling and coordination of care as described above  Current Length of Stay: 3 day(s)    Current Patient Status: Inpatient   Certification Statement: The patient will continue to require additional inpatient hospital stay due to Pending ablation tomorrow    Discharge Plan:     Code Status: Level 1 - Full Code      Subjective:   Patient seen and examined  No events overnight  Patient noted to have short run of an SVT on telemetry  Discussed with EP  Unlikely this is true NSVT    Objective:     Vitals:   Temp (24hrs), Av 7 °F (36 5 °C), Min:97 4 °F (36 3 °C), Max:98 4 °F (36 9 °C)    Temp:  [97 4 °F (36 3 °C)-98 4 °F (36 9 °C)] 97 7 °F (36 5 °C)  HR:  [] 118  Resp:  [14-18] 14  BP: (137-157)/() 157/113  SpO2:  [90 %-96 %] 95 %  Body mass index is 31 95 kg/m²  Input and Output Summary (last 24 hours): Intake/Output Summary (Last 24 hours) at 2022 1529  Last data filed at 2022 0201  Gross per 24 hour   Intake 660 ml   Output 550 ml   Net 110 ml       Physical Exam:     Physical Exam  Constitutional:       Appearance: She is not ill-appearing  HENT:      Head: Normocephalic and atraumatic  Nose: Nose normal    Eyes:      General: No scleral icterus  Cardiovascular:      Rate and Rhythm: Normal rate  Rhythm irregular  Pulses: Normal pulses  Pulmonary:      Effort: Pulmonary effort is normal    Abdominal:      General: Abdomen is flat  Musculoskeletal:         General: Normal range of motion        Cervical back: Normal range of motion and neck supple  Skin:     General: Skin is warm  Neurological:      General: No focal deficit present  Mental Status: She is alert  Additional Data:     Labs:    Results from last 7 days   Lab Units 06/20/22  0436 06/18/22  0456   WBC Thousand/uL 3 14* 3 89*   HEMOGLOBIN g/dL 11 1* 11 2*   HEMATOCRIT % 36 1 35 7   PLATELETS Thousands/uL 120* 116*   NEUTROS PCT %  --  55   LYMPHS PCT %  --  35   MONOS PCT %  --  7   EOS PCT %  --  2     Results from last 7 days   Lab Units 06/20/22  0436 06/18/22  0456   POTASSIUM mmol/L 4 3 3 8   CHLORIDE mmol/L 109* 111*   CO2 mmol/L 28 22   BUN mg/dL 18 17   CREATININE mg/dL 1 66* 1 66*   CALCIUM mg/dL 8 6 8 7   ALK PHOS U/L  --  53   ALT U/L  --  18   AST U/L  --  17     Results from last 7 days   Lab Units 06/18/22  0456   INR  1 14       * I Have Reviewed All Lab Data Listed Above  * Additional Pertinent Lab Tests Reviewed:  Shirlene [address] Admission Reviewed    Imaging:    Imaging Reports Reviewed Today Include:   Imaging Personally Reviewed by Myself Includes:      Recent Cultures (last 7 days):           Last 24 Hours Medication List:   Current Facility-Administered Medications   Medication Dose Route Frequency Provider Last Rate    acetaminophen  650 mg Oral Q6H PRN Donn Fritz MD      aluminum-magnesium hydroxide-simethicone  30 mL Oral Q6H PRN Donn Fritz MD      amiodarone  200 mg Oral Daily Donn Fritz MD      butalbital-acetaminophen-caffeine  1 tablet Oral Q4H PRN Tawnya Javed MD      [START ON 6/21/2022] cefazolin  2,000 mg Intravenous Q8H Nasrin Santillan PA-C      docusate sodium  100 mg Oral BID PRN Donn Fritz MD      doxazosin  2 mg Oral HS Donn Fritz MD      furosemide  20 mg Oral Daily Donn Fritz MD      metoprolol succinate  150 mg Oral BID Donn Fritz MD      nicotine  1 patch Transdermal Daily Donn Fritz MD      ondansetron  4 mg Intravenous Q6H PRN Jansi Ferrer MD      rivaroxaban  15 mg Oral Daily With Fatou Becerril      traMADol  50 mg Oral Q6H PRN Maxime Perez MD          Today, Patient Was Seen By: Maxime Perez MD    ** Please Note: Dictation voice to text software may have been used in the creation of this document   **

## 2022-06-20 NOTE — UTILIZATION REVIEW
Initial Clinical Review    Admission: Date/Time/Statement:   Admission Orders (From admission, onward)     Ordered        06/17/22 2001  Inpatient Admission  Once                      Orders Placed This Encounter   Procedures    Inpatient Admission     Standing Status:   Standing     Number of Occurrences:   1     Order Specific Question:   Level of Care     Answer:   Med Surg [16]     Order Specific Question:   Estimated length of stay     Answer:   More than 2 Midnights     Order Specific Question:   Certification     Answer:   I certify that inpatient services are medically necessary for this patient for a duration of greater than two midnights  See H&P and MD Progress Notes for additional information about the patient's course of treatment  ED Arrival Information     Expected   6/17/2022     Arrival   6/17/2022 17:42    Acuity   Emergent            Means of arrival   Walk-In    Escorted by   Family Member    Service   Hospitalist    Admission type   Emergency            Arrival complaint   a flutter           Chief Complaint   Patient presents with    Rapid Heart Rate     Pt presents ambulatory with c/o rapid heart rate  Sent from cardiologist for atrial flutter       Initial Presentation: 62 y o  female with PMHx of A-fib s/p ablation, h/o hypertrophic cardiomyopathy, nicotine use, HTN and headaches presents to ED from his cardiologist office where he was seen in atrial flutter  Pt reports he has an on and off HA with palpitations  She was referred to [facility] from cardiologist office for further monitoring and to control the atrial flutter  WBC 4 12, Plts 133, , Cr 2 01  In ED given 1 dose of metoprolol without much improvement; diltiazem 20 mg have decreased  ADMIT INPATIENT to M/S/TELE UNIT with ATRIAL FLUTTER  Tele monitoring  Troponins x3  Hold Rivaroxaban  Cardiology consult  Repeat BMP with CBC  Npo after MN  Nicotine patch  Mylanta prn      Date: 6/18  Day 2: Pt reported she is having her headache  Will resume diet, d/c IVFs  Trops have been neg  Continue Xarelto  Pt is scheduled for EPS/flutter ablation early next week  Continue cardizem gtt, po meds  EP consult 6/18 -- give her digoxin 250 mcg x 1, d/c her diltiazem is not being effective and there is no reason to keep her IV going at 2 5 milligrams/hour  She will likely need either repeat ablation or cardioversion  Her primary electrophysiologist will be in tomorrow and I will defer to his preference   The pt is currently asymptomatic despite her rapid HR in the 120s at times          ED Triage Vitals   Temperature Pulse Respirations Blood Pressure SpO2   06/17/22 1753 06/17/22 1754 06/17/22 1754 06/17/22 1754 06/17/22 1754   98 9 °F (37 2 °C) (!) 135 20 (!) 170/110 100 %      Temp Source Heart Rate Source Patient Position - Orthostatic VS BP Location FiO2 (%)   06/17/22 1753 06/17/22 1754 06/17/22 1945 06/17/22 1945 --   Oral Monitor Lying Right arm       Pain Score       06/17/22 2000       9          Wt Readings from Last 1 Encounters:   06/19/22 92 5 kg (204 lb)     Additional Vital Signs:   Date/Time Temp Pulse Resp BP MAP (mmHg) SpO2 O2 Device   06/18/22 22:37:22 97 5 °F (36 4 °C) 64 15 137/89 105 96 % None (Room air)   06/18/22 21:13:43 -- 105 -- 149/98 115 93 % --   06/18/22 2000 -- -- -- -- -- -- None (Room air)   06/18/22 18:55:04 98 5 °F (36 9 °C) 71 17 151/102 Abnormal  118 94 % --   06/18/22 1716 -- 112 Abnormal  -- 165/91 -- -- --   06/18/22 14:57:41 97 5 °F (36 4 °C) 83 16 116/89 98 98 % --   06/18/22 10:46:13 98 2 °F (36 8 °C) 99 17 148/86 107 94 % --   06/18/22 0847 -- 98 -- 129/86 -- -- --   06/18/22 0800 -- -- -- -- -- -- None (Room air)   06/18/22 06:46:32 97 4 °F (36 3 °C) Abnormal  61 18 136/84 101 93 % --   06/18/22 02:59:35 97 4 °F (36 3 °C) Abnormal  62 15 135/78 97 89 % Abnormal  None (Room air)       Pertinent Labs/Diagnostic Test Results:   CT head without contrast   Final Result by Cherie Sparks MD (06/17 1957)      No acute intracranial abnormality  XR chest 2 views   Final Result by Ml Bonilla MD (06/17 2226)      Mild emphysematous changes and subtle bibasilar atelectasis noted without pneumothorax or lobar airspace consolidation                 Results from last 7 days   Lab Units 06/18/22  0456 06/17/22  1810   WBC Thousand/uL 3 89* 4 12*   HEMOGLOBIN g/dL 11 2* 11 5   HEMATOCRIT % 35 7 36 8   PLATELETS Thousands/uL 116* 133*   NEUTROS ABS Thousands/µL 2 15 2 54     Results from last 7 days   Lab Units 06/18/22  0456 06/17/22  1810   SODIUM mmol/L 140 141   POTASSIUM mmol/L 3 8 3 8   CHLORIDE mmol/L 111* 110*   CO2 mmol/L 22 25   ANION GAP mmol/L 7 6   BUN mg/dL 17 19   CREATININE mg/dL 1 66* 2 01*   EGFR ml/min/1 73sq m 33 26   CALCIUM mg/dL 8 7 8 9   MAGNESIUM mg/dL 1 9  --    PHOSPHORUS mg/dL 3 1  --      Results from last 7 days   Lab Units 06/18/22  0456   AST U/L 17   ALT U/L 18   ALK PHOS U/L 53   TOTAL PROTEIN g/dL 7 5   ALBUMIN g/dL 3 2*   TOTAL BILIRUBIN mg/dL 0 40     Results from last 7 days   Lab Units 06/18/22  0456 06/17/22  1810   GLUCOSE RANDOM mg/dL 88 79     Results from last 7 days   Lab Units 06/17/22  2201 06/17/22  2017 06/17/22  1810   HS TNI 0HR ng/L  --   --  35   HS TNI 2HR ng/L  --  34  --    HSTNI D2 ng/L  --  -1  --    HS TNI 4HR ng/L 34  --   --    HSTNI D4 ng/L -1  --   --      Results from last 7 days   Lab Units 06/18/22  0456   PROTIME seconds 14 1   INR  1 14   PTT seconds 33     Results from last 7 days   Lab Units 06/18/22  0456   TSH 3RD GENERATON uIU/mL 8 280*       ED Treatment:   Medication Administration from 06/17/2022 1553 to 06/17/2022 2104       Date/Time Order Dose Route Action     06/17/2022 1810 metoprolol (LOPRESSOR) injection 5 mg 5 mg Intravenous Given     06/17/2022 1940 sodium chloride 0 9 % infusion 150 mL/hr Intravenous New Bag     06/17/2022 1938 diltiazem (CARDIZEM) injection 20 mg 20 mg Intravenous Given     06/17/2022 1942 metoclopramide (REGLAN) injection 10 mg 10 mg Intravenous Given     Past Medical History:   Diagnosis Date    ACS (acute coronary syndrome) (HCC) 2/7/2021    Arrhythmia     Arthritis     Atrial fibrillation (HCC)     Atrial fibrillation with rapid ventricular response (HCC) 3/20/2016    Breast lump     CKD (chronic kidney disease) stage 3, GFR 30-59 ml/min (HCC)     Disease of thyroid gland     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 5/25/2020    GERD (gastroesophageal reflux disease)     H/O transfusion 1987    Hepatitis C     resolved    Hepatitis C     Hyperlipidemia     Hypertension     Irregular heart beat     Pacemaker     Sleep apnea     no cpap    Tachycardia      Present on Admission:   Atypical atrial flutter (HCC)   Benign hypertension with CKD (chronic kidney disease) stage III (HCC)   Gastroesophageal reflux disease without esophagitis   Nicotine dependence   Cocaine abuse (HCC)   CKD (chronic kidney disease) stage 3, GFR 30-59 ml/min (HCC)      Admitting Diagnosis: Atrial flutter (HCC) [I48 92]  Palpitations [R00 2]  Atrial flutter with rapid ventricular response (HCC) [I48 92]  Atrial flutter, unspecified type ([place] [address] ) [I48 92]  Age/Sex: 62 y o  female  Admission Orders:  Scheduled Medications:  amiodarone, 200 mg, Oral, Daily  [START ON 6/21/2022] cefazolin, 2,000 mg, Intravenous, Q8H  doxazosin, 2 mg, Oral, HS  furosemide, 20 mg, Oral, Daily  metoprolol succinate, 150 mg, Oral, BID  nicotine, 1 patch, Transdermal, Daily  rivaroxaban, 15 mg, Oral, Daily With Dinner      Continuous IV Infusions:  diltiazem (CARDIZEM) 125 mg in sodium chloride 0 9 % 125 mL infusion  Rate: 1-15 mL/hr Dose: 1-15 mg/hr  Freq: Titrated Route: IV  Start: 06/17/22 2015       sodium chloride 0 9 % infusion  Rate: 150 mL/hr Dose: 150 mL/hr  Freq: Continuous Route: IV  Indications of Use: IV Hydration  Last Dose: Stopped (06/18/22 1111)  Start: 06/17/22 1815 End: 06/18/22 1111 PRN Meds:  acetaminophen, 650 mg, Oral, Q6H PRN 6/17 x1, 6/18 x2  aluminum-magnesium hydroxide-simethicone, 30 mL, Oral, Q6H PRN  butalbital-acetaminophen-caffeine, 1 tablet, Oral, Q4H PRN 6/18 x1  docusate sodium, 100 mg, Oral, BID PRN  ondansetron, 4 mg, Intravenous, Q6H PRN 6/18 x1  traMADol, 50 mg, Oral, Q6H PRN        Network Utilization Review Department  ATTENTION: Please call with any questions or concerns to [phone] and carefully listen to the prompts so that you are directed to the right person  All voicemails are confidential   Kailey Carlin all requests for admission clinical reviews, approved or denied determinations and any other requests to dedicated fax number below belonging to the campus where the patient is receiving treatment   List of dedicated fax numbers for the Facilities:  [address] DENIALS (Administrative/Medical Necessity) [phone]   [address] (Maternity/NICU/Pediatrics) [phone]   [address]  [address] [place] [address] [address] [place] [address] [address] [facility] [place] [address] [phone]

## 2022-06-20 NOTE — ASSESSMENT & PLAN NOTE
Patient sent from Cardiology office due to atrial flutter  As per cardiology progress note patient noted to be in a flutter since 4/22  Patient was on Cardizem drip-discontinued  Telemetry  Troponins remained negative  Potassium at 3 8; will give KCl 20 mEq to raise potassium level above 4  Electrophysiology consult appreciated  Scheduled for ablation tomorrow   Continue with Xarelto - holding for the procedure in a m    Different of NSVT noted-discussed with electrophysiology possible aberrancy rather than true NSVT

## 2022-06-20 NOTE — PROGRESS NOTES
Progress Note - Electrophysiology-Cardiology (EP)   Jenna Kay 62 y o  female MRN: [id number]  Unit/Bed#: [facility] 402-01 Encounter: [id number]      Assessment:  1  Persistent atrial flutter with rapid ventricular response, with breakthrough despite amiodarone antiarrhythmic therapy              A ) known atrial fibrillation with prior cryoablation with pulmonary vein isolation 05/2020, no note of typical flutter line performed at that time              B ) amiodarone started 5/2021 when she presented with tachy-mediated cardiomyopathy and increased afib burden              C ) on Xarelto anticoagulation; her rate control has been uptitrated and she is currently on Toprol- mg twice daily  2  Hypertrophic cardiomyopathy  3  Chronic HFpEF  4  Prior tachy-mediated cardiomyopathy with now recovered LVEF 55% per echo 1/2022   A ) echo this admission showed LVEF 40-45%              B ) previously 30% per echo 5/2021 in the setting of increased burden of rapid afib              C ) no obstructive CAD per cardiac cath 1/2019  5  Prior ventricular tachycardia in 2016, status post secondary prevention [employer] dual chamber ICD  6  Hypertension  7  CKD 3  8  Tobacco abuse  9  Obesity with BMI 32      Plan:  She remains in persistent atrial flutter, suspect atypical   Our recommendations would be to undergo an EP study/atrial flutter ablation for more definitive treatment  We explained the procedure to the patient in detail, and she is in agreement to proceed  Will schedule her ablation for tomorrow afternoon, 6/21/2022  Okay to give Xarelto this evening, then hold her dose tomorrow  Okay to continue amiodarone and Toprol-XL  Check a m  Labs  I explained to her that echocardiogram this admission showed reduced LVEF 40-45%, suspect tachy mediated which he has had in the past   This could be re-evaluated moving forward once she is maintaining sinus rhythm    Fortunately, she does not exhibit signs or symptoms consistent with heart failure  Subjective/Objective   Chief Complaint:  No acute complaints    Subjective:  She feels relatively well, denies significant palpitations, chest pain, shortness of breath, or orthopnea  No significant issues or events reported overnight        Objective:     Vitals: BP (!) 154/105   Pulse 105   Temp 97 8 °F (36 6 °C)   Resp 14   Ht 5' 7" (1 702 m)   Wt 92 5 kg (204 lb)   SpO2 92%   BMI 31 95 kg/m²   Vitals:    06/17/22 2133 06/19/22 5193   Weight: 92 9 kg (204 lb 12 9 oz) 92 5 kg (204 lb)     Orthostatic Blood Pressures    Flowsheet Row Most Recent Value   Blood Pressure 154/105 filed at 06/20/2022 1049   Patient Position - Orthostatic VS Lying filed at 06/19/2022 2304            Intake/Output Summary (Last 24 hours) at 6/20/2022 1156  Last data filed at 6/20/2022 0201  Gross per 24 hour   Intake 660 ml   Output 950 ml   Net -290 ml       Invasive Devices  Report    Peripheral Intravenous Line  Duration           Peripheral IV 06/19/22 Right Antecubital 1 day                            Scheduled Meds:  Current Facility-Administered Medications   Medication Dose Route Frequency Provider Last Rate    acetaminophen  650 mg Oral Q6H PRN Yinka Garza MD      aluminum-magnesium hydroxide-simethicone  30 mL Oral Q6H PRN Yinka Garza MD      amiodarone  200 mg Oral Daily Yinka aGrza MD      butalbital-acetaminophen-caffeine  1 tablet Oral Q4H PRN Neha Pascal MD      docusate sodium  100 mg Oral BID PRN Yinka Garza MD      doxazosin  2 mg Oral HS Yinka Garza MD      furosemide  20 mg Oral Daily Yinka Garza MD      metoprolol succinate  150 mg Oral BID Yinka Garza MD      nicotine  1 patch Transdermal Daily Yinka Garza MD      ondansetron  4 mg Intravenous Q6H PRN Yinka Garza MD      rivaroxaban  15 mg Oral Daily With CHARITY Olivera      traMADol  50 mg Oral Q6H PRN Neha Pascal MD       Continuous Infusions: PRN Meds:   acetaminophen    aluminum-magnesium hydroxide-simethicone    butalbital-acetaminophen-caffeine    docusate sodium    ondansetron    traMADol    Review of Systems   Constitutional: Negative for fever  Cardiovascular: Positive for irregular heartbeat  Negative for chest pain, dyspnea on exertion, leg swelling, near-syncope, orthopnea, palpitations, paroxysmal nocturnal dyspnea and syncope  All other systems reviewed and are negative  Physical Exam:   GEN: NAD, alert and oriented x 3, well appearing  SKIN: dry without significant lesions or rashes  HEENT: NCAT, PERRL, EOMs intact  NECK: No JVD appreciated  CARDIOVASCULAR:  Slightly irregular, tachycardic, normal S1, S2 without murmurs, rubs, or gallops appreciated  LUNGS: Clear to auscultation bilaterally without wheezes, rhonchi, or rales  ABDOMEN: Soft, nontender, nondistended  EXTREMITIES/VASCULAR: perfused without clubbing, cyanosis, or LE edema b/l  PSYCH: Normal mood and affect  NEURO: CN ll-Xll grossly intact                Lab Results: I have personally reviewed pertinent lab results  Results from last 7 days   Lab Units 06/20/22  0436 06/18/22  0456 06/17/22  1810   WBC Thousand/uL 3 14* 3 89* 4 12*   HEMOGLOBIN g/dL 11 1* 11 2* 11 5   HEMATOCRIT % 36 1 35 7 36 8   PLATELETS Thousands/uL 120* 116* 133*     Results from last 7 days   Lab Units 06/20/22  0436 06/18/22  0456 06/17/22  1810   POTASSIUM mmol/L 4 3 3 8 3 8   CHLORIDE mmol/L 109* 111* 110*   CO2 mmol/L 28 22 25   BUN mg/dL 18 17 19   CREATININE mg/dL 1 66* 1 66* 2 01*   CALCIUM mg/dL 8 6 8 7 8 9     Results from last 7 days   Lab Units 06/18/22  0456   INR  1 14   PTT seconds 33     Results from last 7 days   Lab Units 06/18/22  0456   MAGNESIUM mg/dL 1 9         Imaging: I have personally reviewed pertinent reports      Results for orders placed during the hospital encounter of 05/24/21    Echo complete with contrast if indicated    Narrative  [address] [facility] [address]    Transthoracic Echocardiogram  2D, M-mode, Doppler, and Color Doppler    Study date:  25-May-2021    Patient: Carmenza Cervantes  MR number: [id number]  Account number: [de-identified]  : 1965  Age: 64 years  Gender: Female  Status: Inpatient  Location: [facility]  Height: 67 in  Weight: 212 lb  BP: 118/ 62 mmHg    Indications: Afib    Diagnoses: I48 0 - Atrial fibrillation    Sonographer:  PRISCILLA Veras  Referring Physician:  Moshe Gaitan MD  Group:  [facility]  Interpreting Physician:  Mukund Nguyen MD    SUMMARY    LEFT VENTRICLE:  Systolic function was moderately to markedly reduced  Ejection fraction was estimated to be 30 %  There was moderate diffuse hypokinesis  There was severe concentric hypertrophy  There was significant hypertrophy of the LV apex  RIGHT VENTRICLE:  The size was normal   Systolic function was reduced  LEFT ATRIUM:  The atrium was dilated  HISTORY: PRIOR HISTORY: Cocaine abuse, Smoker, CKD III, Congestive heart failure  Hypertrophic cardiomyopathy  Atrial fibrillation  Risk factors: hypercholesterolemia  PRIOR PROCEDURES: ICD implantation  Arrhythmia ablation  PROCEDURE: The study was performed in the [facility]  This was a routine study  The transthoracic approach was used  The study included complete 2D imaging, M-mode, complete spectral Doppler, and color Doppler  The heart rate was 138  bpm, at the start of the study  Images were obtained from the parasternal, apical, subcostal, and suprasternal notch acoustic windows  Intravenous contrast (  6 mL Definity in NSS) was administered  Echocardiographic views were limited due  to poor acoustic window availability and lung interference  This was a technically difficult study  LEFT VENTRICLE: Size was normal  Systolic function was moderately to markedly reduced  Ejection fraction was estimated to be 30 %   There was moderate diffuse hypokinesis  Wall thickness was markedly increased  There was severe concentric  hypertrophy  There was significant hypertrophy of the LV apex  DOPPLER: Due to tachycardia, there was fusion of early and atrial contributions to ventricular filling  The study was not technically sufficient to allow evaluation of LV  diastolic function  RIGHT VENTRICLE: The size was normal  Systolic function was reduced  Wall thickness was normal  A pacing wire was present  LEFT ATRIUM: The atrium was dilated  RIGHT ATRIUM: Size was normal  A pacing wire was present  MITRAL VALVE: Valve structure was normal  There was normal leaflet separation  DOPPLER: The transmitral velocity was within the normal range  There was no evidence for stenosis  There was trace regurgitation  AORTIC VALVE: The valve was trileaflet  Leaflets exhibited normal thickness and normal cuspal separation  DOPPLER: Transaortic velocity was within the normal range  There was no evidence for stenosis  There was no significant  regurgitation  TRICUSPID VALVE: The valve structure was normal  There was normal leaflet separation  DOPPLER: The transtricuspid velocity was within the normal range  There was no evidence for stenosis  There was trace regurgitation  Pulmonary artery  systolic pressure was within the normal range  Estimated peak PA pressure was 21 mmHg  PULMONIC VALVE: Leaflets exhibited normal thickness, no calcification, and normal cuspal separation  DOPPLER: The transpulmonic velocity was within the normal range  There was trace regurgitation  PERICARDIUM: There was no pericardial effusion  The pericardium was normal in appearance  AORTA: The root exhibited normal size  SYSTEMIC VEINS: IVC: The inferior vena cava was normal in size   Respirophasic changes were normal     SYSTEM MEASUREMENT TABLES    2D  %FS: 27 49 %  Ao Diam: 2 77 cm  EDV(Teich): 57 94 ml  EF(Teich): 54 26 %  ESV(Teich): 26 5 ml  IVSd: 2 46 cm  LA Area: 26 06 cm2  LA Diam: 4 27 cm  LVIDd: 3 7 cm  LVIDs: 2 68 cm  LVPWd: 1 79 cm  RA Area: 18 49 cm2  RVIDd: 3 01 cm  RWT: 0 97  SV(Teich): 31 44 ml    CW  TR Vmax: 2 14 m/s  TR maxP 28 mmHg    MM  TAPSE: 1 51 cm    [facility]    Prepared and electronically signed by    Shabana Montez MD  Signed 25-May-2021 14:44:53      EKG/TELEMETRY:  Ongoing atrial flutter with rapid ventricular response, a brief run of wide complex tachycardia recently noted however this appears to be aberrancy      VTE Pharmacologic Prophylaxis: Xarelto

## 2022-06-20 NOTE — ASSESSMENT & PLAN NOTE
Lab Results   Component Value Date    EGFR 33 06/20/2022    EGFR 33 06/18/2022    EGFR 26 06/17/2022    CREATININE 1 66 (H) 06/20/2022    CREATININE 1 66 (H) 06/18/2022    CREATININE 2 01 (H) 06/17/2022   Monitor creatinine

## 2022-06-20 NOTE — ASSESSMENT & PLAN NOTE
Lab Results   Component Value Date    EGFR 33 06/20/2022    EGFR 33 06/18/2022    EGFR 26 06/17/2022    CREATININE 1 66 (H) 06/20/2022    CREATININE 1 66 (H) 06/18/2022    CREATININE 2 01 (H) 06/17/2022   Patient was on IV fluids given previous history of tachy induced cardiomyopathy be will discontinue IV fluids  Monitor creatinine

## 2022-06-21 ENCOUNTER — ANESTHESIA (INPATIENT)
Dept: NON INVASIVE DIAGNOSTICS | Facility: HOSPITAL | Age: 57
DRG: 175 | End: 2022-06-21
Payer: COMMERCIAL

## 2022-06-21 LAB
ANION GAP SERPL CALCULATED.3IONS-SCNC: 2 MMOL/L (ref 4–13)
BASOPHILS # BLD AUTO: 0.02 THOUSANDS/ΜL (ref 0–0.1)
BASOPHILS NFR BLD AUTO: 1 % (ref 0–1)
BUN SERPL-MCNC: 22 MG/DL (ref 5–25)
CALCIUM SERPL-MCNC: 9 MG/DL (ref 8.3–10.1)
CHLORIDE SERPL-SCNC: 106 MMOL/L (ref 100–108)
CO2 SERPL-SCNC: 31 MMOL/L (ref 21–32)
CREAT SERPL-MCNC: 1.66 MG/DL (ref 0.6–1.3)
EOSINOPHIL # BLD AUTO: 0.13 THOUSAND/ΜL (ref 0–0.61)
EOSINOPHIL NFR BLD AUTO: 4 % (ref 0–6)
ERYTHROCYTE [DISTWIDTH] IN BLOOD BY AUTOMATED COUNT: 15.1 % (ref 11.6–15.1)
GFR SERPL CREATININE-BSD FRML MDRD: 33 ML/MIN/1.73SQ M
GLUCOSE SERPL-MCNC: 74 MG/DL (ref 65–140)
HCT VFR BLD AUTO: 37.4 % (ref 34.8–46.1)
HGB BLD-MCNC: 11.5 G/DL (ref 11.5–15.4)
IMM GRANULOCYTES # BLD AUTO: 0.01 THOUSAND/UL (ref 0–0.2)
IMM GRANULOCYTES NFR BLD AUTO: 0 % (ref 0–2)
LYMPHOCYTES # BLD AUTO: 1.1 THOUSANDS/ΜL (ref 0.6–4.47)
LYMPHOCYTES NFR BLD AUTO: 33 % (ref 14–44)
MAGNESIUM SERPL-MCNC: 2.3 MG/DL (ref 1.6–2.6)
MCH RBC QN AUTO: 26.4 PG (ref 26.8–34.3)
MCHC RBC AUTO-ENTMCNC: 30.7 G/DL (ref 31.4–37.4)
MCV RBC AUTO: 86 FL (ref 82–98)
MONOCYTES # BLD AUTO: 0.27 THOUSAND/ΜL (ref 0.17–1.22)
MONOCYTES NFR BLD AUTO: 8 % (ref 4–12)
NEUTROPHILS # BLD AUTO: 1.81 THOUSANDS/ΜL (ref 1.85–7.62)
NEUTS SEG NFR BLD AUTO: 54 % (ref 43–75)
NRBC BLD AUTO-RTO: 0 /100 WBCS
PLATELET # BLD AUTO: 128 THOUSANDS/UL (ref 149–390)
PMV BLD AUTO: 10.9 FL (ref 8.9–12.7)
POTASSIUM SERPL-SCNC: 4.2 MMOL/L (ref 3.5–5.3)
RBC # BLD AUTO: 4.35 MILLION/UL (ref 3.81–5.12)
SODIUM SERPL-SCNC: 139 MMOL/L (ref 136–145)
WBC # BLD AUTO: 3.34 THOUSAND/UL (ref 4.31–10.16)

## 2022-06-21 PROCEDURE — 85025 COMPLETE CBC W/AUTO DIFF WBC: CPT | Performed by: PHYSICIAN ASSISTANT

## 2022-06-21 PROCEDURE — 80048 BASIC METABOLIC PNL TOTAL CA: CPT | Performed by: PHYSICIAN ASSISTANT

## 2022-06-21 PROCEDURE — 99232 SBSQ HOSP IP/OBS MODERATE 35: CPT | Performed by: HOSPITALIST

## 2022-06-21 PROCEDURE — 83735 ASSAY OF MAGNESIUM: CPT | Performed by: PHYSICIAN ASSISTANT

## 2022-06-21 RX ORDER — ACETAMINOPHEN 325 MG/1
650 TABLET ORAL EVERY 4 HOURS PRN
Status: CANCELLED | OUTPATIENT
Start: 2022-06-21

## 2022-06-21 RX ORDER — CEFAZOLIN SODIUM 2 G/50ML
2000 SOLUTION INTRAVENOUS EVERY 8 HOURS
Status: DISCONTINUED | OUTPATIENT
Start: 2022-06-22 | End: 2022-06-23 | Stop reason: HOSPADM

## 2022-06-21 RX ORDER — ONDANSETRON 2 MG/ML
4 INJECTION INTRAMUSCULAR; INTRAVENOUS EVERY 6 HOURS PRN
Status: CANCELLED | OUTPATIENT
Start: 2022-06-21

## 2022-06-21 RX ADMIN — AMIODARONE HYDROCHLORIDE 200 MG: 200 TABLET ORAL at 08:39

## 2022-06-21 RX ADMIN — TRAMADOL HYDROCHLORIDE 50 MG: 50 TABLET, COATED ORAL at 11:45

## 2022-06-21 RX ADMIN — ACETAMINOPHEN 650 MG: 325 TABLET, FILM COATED ORAL at 15:56

## 2022-06-21 RX ADMIN — TRAMADOL HYDROCHLORIDE 50 MG: 50 TABLET, COATED ORAL at 18:15

## 2022-06-21 RX ADMIN — TRAMADOL HYDROCHLORIDE 50 MG: 50 TABLET, COATED ORAL at 05:24

## 2022-06-21 RX ADMIN — RIVAROXABAN 15 MG: 15 TABLET, FILM COATED ORAL at 17:08

## 2022-06-21 RX ADMIN — FUROSEMIDE 20 MG: 20 TABLET ORAL at 08:38

## 2022-06-21 RX ADMIN — DOXAZOSIN 2 MG: 2 TABLET ORAL at 21:01

## 2022-06-21 RX ADMIN — METOPROLOL SUCCINATE 150 MG: 100 TABLET, EXTENDED RELEASE ORAL at 08:38

## 2022-06-21 RX ADMIN — NICOTINE 1 PATCH: 21 PATCH, EXTENDED RELEASE TRANSDERMAL at 08:38

## 2022-06-21 RX ADMIN — METOPROLOL SUCCINATE 150 MG: 100 TABLET, EXTENDED RELEASE ORAL at 17:04

## 2022-06-21 NOTE — PLAN OF CARE
Problem: CARDIOVASCULAR - ADULT  Goal: Maintains optimal cardiac output and hemodynamic stability  Description: INTERVENTIONS:  - Monitor I/O, vital signs and rhythm  - Monitor for S/S and trends of decreased cardiac output  - Administer and titrate ordered vasoactive medications to optimize hemodynamic stability  - Assess quality of pulses, skin color and temperature  - Assess for signs of decreased coronary artery perfusion  - Instruct patient to report change in severity of symptoms  Outcome: Progressing  Goal: Absence of cardiac dysrhythmias or at baseline rhythm  Description: INTERVENTIONS:  - Continuous cardiac monitoring, vital signs, obtain 12 lead EKG if ordered  - Administer antiarrhythmic and heart rate control medications as ordered  - Monitor electrolytes and administer replacement therapy as ordered  Outcome: Progressing     Problem: SAFETY ADULT  Goal: Maintain or return to baseline ADL function  Description: INTERVENTIONS:  -  Assess patient's ability to carry out ADLs; assess patient's baseline for ADL function and identify physical deficits which impact ability to perform ADLs (bathing, care of mouth/teeth, toileting, grooming, dressing, etc )  - Assess/evaluate cause of self-care deficits   - Assess range of motion  - Assess patient's mobility; develop plan if impaired  - Assess patient's need for assistive devices and provide as appropriate  - Encourage maximum independence but intervene and supervise when necessary  - Involve family in performance of ADLs  - Assess for home care needs following discharge   - Consider OT consult to assist with ADL evaluation and planning for discharge  - Provide patient education as appropriate  Outcome: Progressing     Problem: SAFETY ADULT  Goal: Maintains/Returns to pre admission functional level  Description: INTERVENTIONS:  - Perform BMAT or MOVE assessment daily    - Set and communicate daily mobility goal to care team and patient/family/caregiver     - Collaborate with rehabilitation services on mobility goals if consulted  - Perform Range of Motion 5 times a day  - Reposition patient every 5 hours    - Dangle patient 5 times a day  - Stand patient 5 times a day  - Ambulate patient 5 times a day  - Out of bed to chair 5 times a day   - Out of bed for meals 3 times a day  - Out of bed for toileting  - Record patient progress and toleration of activity level   Outcome: Progressing

## 2022-06-21 NOTE — ASSESSMENT & PLAN NOTE
Patient sent from Cardiology office due to atrial flutter  As per cardiology progress note, patient noted to be in a flutter since 4/22 on device interrogation  In setting of history of AF s/p cryoablation with pulmonary vein isolation in 5/2020  After this, she was noted to have tachycardia-associated cardiomyopathy with increased AF burden  On admission, she required cardizem drip for rate control which has since been discontinued 2/2 no effect  Troponins negative  Patient has been continued on her outpatient amiodarone and Toprol and is now s/p A flutter ablation  Tele has shown no events since her procedure      - Continue telemetry  - Electrophysiology consult appreciated  --- Continue amiodarone 100mg daily  --- Continue home doses of metoprolol, Xarelto, entresto  --- Strict outpatient follow up  --- Hb drop noted postprocedure without concern for RP bleed or effusion

## 2022-06-21 NOTE — CASE MANAGEMENT
Case Management Discharge Planning Note    Patient name Bryon Ramsey  Location [address] 155-57 MRN [id number]  : 1965 Date 2022       Current Admission Date: 2022  Current Admission Diagnosis:Atypical atrial flutter [facility])   Patient Active Problem List    Diagnosis Date Noted    Leucopenia 2022    Leg edema 2022    Benign hypertension with CKD (chronic kidney disease) stage III ([facility] ) 2022    Nephrolithiasis 2022    Hypertensive crisis 2022    Electrolyte abnormality 2022    CKD (chronic kidney disease) stage 3, GFR 30-59 ml/min ([facility] ) 2022    Atypical atrial flutter ([facility] ) 2022    Hypertensive urgency 2022    Hepatitis C 2022    Acute right flank pain 08/15/2021    Epigastric abdominal tenderness 08/15/2021    Thrombocytopenia ([facility] ) 2021    Leg swelling 2020    Paroxysmal A-fib ([facility] ) 2020    Cocaine abuse ([facility] ) 2020    Acute on chronic combined systolic and diastolic congestive heart failure ([facility] ) 2020    Elevated lipase 10/10/2019    Palpitations 2019    Elevated troponin 2019    Hypertrophic cardiomyopathy ([facility] ) 2019    Headache 2018    DANIELA (acute kidney injury) ([facility] ) 2018    Nicotine dependence 2018    NSTEMI (non-ST elevated myocardial infarction) ([facility] ) 2018    ICD (implantable cardioverter-defibrillator) discharge 05/10/2017    History of ventricular tachycardia ([facility] ) with ICD 2016    Chest pain at rest 2016    Hyperlipidemia 2016    Uncontrolled hypertension 2016    Gastroesophageal reflux disease without esophagitis 2016      LOS (days): 4  Geometric Mean LOS (GMLOS) (days):   Days to GMLOS:     OBJECTIVE:  Risk of Unplanned Readmission Score: 29 99         Current admission status: Inpatient   Preferred Pharmacy:   [facility]  [address] [facility] [address] [place] [facility] [address]  Phone: [phone] Fax: [phone]    Primary Care Provider: Renee Hatch MD    Primary Insurance: Annika Rosekatya  Secondary Insurance:     DISCHARGE DETAILS:       Additional Comments: Pt remains in atypical atrial flutter with RVR  Was to have ablation today but has been postponed until tomorrow per EP lab  Pt  has diastolic CHF, HCM, CKD3, has ICD  EF 40%  On Xarelto and Lopressor  Lives with  and daughter, uses no DME but has RW, rollator, cane and BSC

## 2022-06-21 NOTE — ASSESSMENT & PLAN NOTE
Lab Results   Component Value Date    EGFR 33 06/21/2022    EGFR 33 06/20/2022    EGFR 33 06/18/2022    CREATININE 1 66 (H) 06/21/2022    CREATININE 1 66 (H) 06/20/2022    CREATININE 1 66 (H) 06/18/2022     - Hesitant to give IVF given history of tachy induced cardiomyopathy  - Continue toprol 75mg BID, doxazosin 2mg daily  - Unsure why home amlodipine is held - restart on discharge  - Medically stable for discharge

## 2022-06-21 NOTE — ASSESSMENT & PLAN NOTE
Lab Results   Component Value Date    EGFR 29 06/23/2022    EGFR 29 06/22/2022    EGFR 33 06/21/2022    CREATININE 1 84 (H) 06/23/2022    CREATININE 1 87 (H) 06/22/2022    CREATININE 1 66 (H) 06/21/2022     · Elevation ISO tramadol use and NPO status for procedure  · Baseline seems to be 1 6-1 8 prior to this hospitalization  · IVF as needed  · Monitor daily  · Medically stable for discharge

## 2022-06-21 NOTE — PROGRESS NOTES
INTERNAL MEDICINE RESIDENCY PROGRESS NOTE     Name: Daniele Spears   Age & Sex: 62 y o  female   MRN: [id number]  Unit/Bed#: [facility] 402-01   Encounter: [id number]  Team: SLIM    PATIENT INFORMATION     Name: Daniele Spears   Age & Sex: 62 y o  female   MRN: [id number]  Hospital Stay Days: 4    ASSESSMENT/PLAN     Principal Problem:    Atypical atrial flutter ([address] )  Active Problems:    Gastroesophageal reflux disease without esophagitis    Headache    Nicotine dependence    Cocaine abuse ([facility])    CKD (chronic kidney disease) stage 3, GFR 30-59 ml/min ([facility])    Benign hypertension with CKD (chronic kidney disease) stage III ([facility])    Leucopenia      * Atypical atrial flutter [facility])  Assessment & Plan  Patient sent from Cardiology office due to atrial flutter  As per cardiology progress note, patient noted to be in a flutter since 4/22 on device interrogation  In setting of history of AF s/p cryoablation with pulmonary vein isolation in 5/2020  After this, she was noted to have tachycardia-associated cardiomyopathy with increased AF burden  On admission, she required cardizem drip for rate control which has since been discontinued 2/2 no effect  Troponins negative  Patient has been continued on her outpatient amiodarone and Toprol with plan for A flutter ablation (as rate control is unlikely with medications alone)  Tele has shown intermittent ventricular ectopy, which was previously discussed with EP and is possibly SVT with aberrancy rather than NSVT  - Continue telemetry  - Electrophysiology consult appreciated  Scheduled for ablation tomorrow (unable to complete today)  - Continue with Xarelto - holding for the procedure in a m      Leucopenia  Assessment & Plan  Stable  Outpatient evaluation     Benign hypertension with CKD (chronic kidney disease) stage III [facility])  Assessment & Plan  Lab Results   Component Value Date    EGFR 33 06/21/2022    EGFR 33 06/20/2022    EGFR 33 06/18/2022    CREATININE 1 66 (H) 2022    CREATININE 1 66 (H) 2022    CREATININE 1 66 (H) 2022     - Hesitant to give IVF given history of tachy induced cardiomyopathy  - Continue toprol 150mg BID, doxazosin 2mg daily  - Unsure why home amlodipine is held? CKD (chronic kidney disease) stage 3, GFR 30-59 ml/min [facility])  Assessment & Plan  Lab Results   Component Value Date    EGFR 33 2022    EGFR 33 2022    EGFR 33 2022    CREATININE 1 66 (H) 2022    CREATININE 1 66 (H) 2022    CREATININE 1 66 (H) 2022     Stable, monitor creatinine    Cocaine abuse (HCC)  Assessment & Plan  Recent UDS was positive for cocaine in April of this year    Nicotine dependence  Assessment & Plan  On nicotine replacement  Headache  Assessment & Plan  · P r n  Tramadol/Fioricet    Gastroesophageal reflux disease without esophagitis  Assessment & Plan  Continue with Mylanta as needed  Disposition: A flutter ablation tomorrow     SUBJECTIVE     Patient seen and examined  No acute events overnight  Patient eager to have her procedure done, headache and hungry (not new)  Otherwise has no complaints  Denies chest pain, palpitations, dyspnea, lightheadedness/dizziness, abdominal pain, nausea/vomiting  OBJECTIVE     Vitals:    22 2129 22 2303 22 0820 22 1118   BP: (!) 154/114 154/90 165/99 166/92   Pulse: 102 71 (!) 52 88   Resp:    16   Temp:  (!) 97 3 °F (36 3 °C) 97 6 °F (36 4 °C) 97 7 °F (36 5 °C)   TempSrc:    Oral   SpO2: 94% 94% 94% 94%   Weight:       Height:          Temperature:   Temp (24hrs), Av 6 °F (36 4 °C), Min:97 3 °F (36 3 °C), Max:97 7 °F (36 5 °C)    Temperature: 97 7 °F (36 5 °C)  Intake & Output:  I/O        0701   0700  0701   0700  0701   0700    P  O  1360 620     I V  (mL/kg)       Total Intake(mL/kg) 1360 (14 7) 620 (6 7)     Urine (mL/kg/hr) 1650 (0 7) 1200 (0 5)     Total Output 1650 1200     Net -290 -580                Weights: Body mass index is 31 95 kg/m²  Weight (last 2 days)     Date/Time Weight    06/19/22 07:12:17 92 5 (204)        Physical Exam  Vitals and nursing note reviewed  Constitutional:       General: She is not in acute distress  Appearance: Normal appearance  She is well-developed  HENT:      Head: Normocephalic and atraumatic  Eyes:      Conjunctiva/sclera: Conjunctivae normal    Cardiovascular:      Rate and Rhythm: Tachycardia present  Rhythm irregular  Heart sounds: S1 normal and S2 normal  No murmur heard  Pulmonary:      Effort: Pulmonary effort is normal  No respiratory distress  Breath sounds: Normal breath sounds  Abdominal:      General: Bowel sounds are normal  There is no distension  Palpations: Abdomen is soft  Tenderness: There is no abdominal tenderness  Musculoskeletal:      Cervical back: Neck supple  Right lower leg: No edema  Left lower leg: No edema  Skin:     General: Skin is warm and dry  Neurological:      Mental Status: She is alert and oriented to person, place, and time  Psychiatric:         Mood and Affect: Mood normal          Thought Content: Thought content normal        LABORATORY DATA     Labs: I have personally reviewed pertinent reports    Results from last 7 days   Lab Units 06/21/22  0522 06/20/22  0436 06/18/22  0456 06/17/22  1810   WBC Thousand/uL 3 34* 3 14* 3 89* 4 12*   HEMOGLOBIN g/dL 11 5 11 1* 11 2* 11 5   HEMATOCRIT % 37 4 36 1 35 7 36 8   PLATELETS Thousands/uL 128* 120* 116* 133*   NEUTROS PCT % 54  --  55 60   MONOS PCT % 8  --  7 11      Results from last 7 days   Lab Units 06/21/22 0522 06/20/22 0436 06/18/22  0456   POTASSIUM mmol/L 4 2 4 3 3 8   CHLORIDE mmol/L 106 109* 111*   CO2 mmol/L 31 28 22   BUN mg/dL 22 18 17   CREATININE mg/dL 1 66* 1 66* 1 66*   CALCIUM mg/dL 9 0 8 6 8 7   ALK PHOS U/L  --   --  53   ALT U/L  --   --  18   AST U/L  --   --  17     Results from last 7 days   Lab Units 06/21/22 0522 06/20/22  0436 06/18/22  0456   MAGNESIUM mg/dL 2 3 2 0 1 9     Results from last 7 days   Lab Units 06/18/22  0456   PHOSPHORUS mg/dL 3 1      Results from last 7 days   Lab Units 06/18/22  0456   INR  1 14   PTT seconds 33               IMAGING & DIAGNOSTIC TESTING     Radiology Results: I have personally reviewed pertinent reports  XR chest 2 views    Result Date: 6/17/2022  Impression: Mild emphysematous changes and subtle bibasilar atelectasis noted without pneumothorax or lobar airspace consolidation  Workstation performed: [id number]     CT head without contrast    Result Date: 6/17/2022  Impression: No acute intracranial abnormality  Workstation performed: [id number]     Other Diagnostic Testing: I have personally reviewed pertinent reports      ACTIVE MEDICATIONS     Current Facility-Administered Medications   Medication Dose Route Frequency    acetaminophen (TYLENOL) tablet 650 mg  650 mg Oral Q6H PRN    aluminum-magnesium hydroxide-simethicone (MYLANTA) oral suspension 30 mL  30 mL Oral Q6H PRN    amiodarone tablet 200 mg  200 mg Oral Daily    butalbital-acetaminophen-caffeine (FIORICET,ESGIC) -40 mg per tablet 1 tablet  1 tablet Oral Q4H PRN    ceFAZolin (ANCEF) IVPB (premix in dextrose) 2,000 mg 50 mL  2,000 mg Intravenous Q8H    docusate sodium (COLACE) capsule 100 mg  100 mg Oral BID PRN    doxazosin (CARDURA) tablet 2 mg  2 mg Oral HS    furosemide (LASIX) tablet 20 mg  20 mg Oral Daily    metoprolol succinate (TOPROL-XL) 24 hr tablet 150 mg  150 mg Oral BID    nicotine (NICODERM CQ) 21 mg/24 hr TD 24 hr patch 1 patch  1 patch Transdermal Daily    ondansetron (ZOFRAN) injection 4 mg  4 mg Intravenous Q6H PRN    rivaroxaban (XARELTO) tablet 15 mg  15 mg Oral After Dinner    traMADol (ULTRAM) tablet 50 mg  50 mg Oral Q6H PRN       VTE Pharmacologic Prophylaxis: Reason for no pharmacologic prophylaxis : periprocedure  VTE Mechanical Prophylaxis: sequential compression device    Portions of the record may have been created with voice recognition software  Occasional wrong word or "sound a like" substitutions may have occurred due to the inherent limitations of voice recognition software    Read the chart carefully and recognize, using context, where substitutions have occurred   ==  Abdias Deleon, [facility]  Internal Medicine Residency PGY-1

## 2022-06-22 ENCOUNTER — APPOINTMENT (INPATIENT)
Dept: RADIOLOGY | Facility: HOSPITAL | Age: 57
DRG: 175 | End: 2022-06-22
Payer: COMMERCIAL

## 2022-06-22 LAB
ANION GAP SERPL CALCULATED.3IONS-SCNC: 2 MMOL/L (ref 4–13)
ATRIAL RATE: 136 BPM
ATRIAL RATE: 60 BPM
ATRIAL RATE: 60 BPM
BUN SERPL-MCNC: 27 MG/DL (ref 5–25)
CALCIUM SERPL-MCNC: 9.1 MG/DL (ref 8.3–10.1)
CHLORIDE SERPL-SCNC: 105 MMOL/L (ref 100–108)
CO2 SERPL-SCNC: 30 MMOL/L (ref 21–32)
CREAT SERPL-MCNC: 1.87 MG/DL (ref 0.6–1.3)
ERYTHROCYTE [DISTWIDTH] IN BLOOD BY AUTOMATED COUNT: 15.1 % (ref 11.6–15.1)
GFR SERPL CREATININE-BSD FRML MDRD: 29 ML/MIN/1.73SQ M
GLUCOSE SERPL-MCNC: 88 MG/DL (ref 65–140)
HCT VFR BLD AUTO: 39.6 % (ref 34.8–46.1)
HGB BLD-MCNC: 12.5 G/DL (ref 11.5–15.4)
KCT BLD-ACNC: 343 SEC (ref 89–137)
KCT BLD-ACNC: 392 SEC (ref 89–137)
KCT BLD-ACNC: 406 SEC (ref 89–137)
KCT BLD-ACNC: 447 SEC (ref 89–137)
KCT BLD-ACNC: 460 SEC (ref 89–137)
MAGNESIUM SERPL-MCNC: 2.2 MG/DL (ref 1.6–2.6)
MCH RBC QN AUTO: 26.2 PG (ref 26.8–34.3)
MCHC RBC AUTO-ENTMCNC: 31.6 G/DL (ref 31.4–37.4)
MCV RBC AUTO: 83 FL (ref 82–98)
P AXIS: 58 DEGREES
P AXIS: 77 DEGREES
P AXIS: 80 DEGREES
PLATELET # BLD AUTO: 143 THOUSANDS/UL (ref 149–390)
PMV BLD AUTO: 10.9 FL (ref 8.9–12.7)
POTASSIUM SERPL-SCNC: 4 MMOL/L (ref 3.5–5.3)
PR INTERVAL: 144 MS
PR INTERVAL: 200 MS
PR INTERVAL: 204 MS
QRS AXIS: -69 DEGREES
QRS AXIS: -71 DEGREES
QRS AXIS: -74 DEGREES
QRSD INTERVAL: 170 MS
QRSD INTERVAL: 179 MS
QRSD INTERVAL: 183 MS
QT INTERVAL: 352 MS
QT INTERVAL: 471 MS
QT INTERVAL: 504 MS
QTC INTERVAL: 471 MS
QTC INTERVAL: 504 MS
QTC INTERVAL: 528 MS
RBC # BLD AUTO: 4.77 MILLION/UL (ref 3.81–5.12)
SODIUM SERPL-SCNC: 137 MMOL/L (ref 136–145)
SPECIMEN SOURCE: ABNORMAL
T WAVE AXIS: 101 DEGREES
T WAVE AXIS: 107 DEGREES
T WAVE AXIS: 95 DEGREES
VENTRICULAR RATE: 135 BPM
VENTRICULAR RATE: 60 BPM
VENTRICULAR RATE: 60 BPM
WBC # BLD AUTO: 4.71 THOUSAND/UL (ref 4.31–10.16)

## 2022-06-22 PROCEDURE — 93653 COMPRE EP EVAL TX SVT: CPT | Performed by: INTERNAL MEDICINE

## 2022-06-22 PROCEDURE — C1730 CATH, EP, 19 OR FEW ELECT: HCPCS | Performed by: INTERNAL MEDICINE

## 2022-06-22 PROCEDURE — C1731 CATH, EP, 20 OR MORE ELEC: HCPCS | Performed by: INTERNAL MEDICINE

## 2022-06-22 PROCEDURE — C1894 INTRO/SHEATH, NON-LASER: HCPCS | Performed by: INTERNAL MEDICINE

## 2022-06-22 PROCEDURE — 93005 ELECTROCARDIOGRAM TRACING: CPT

## 2022-06-22 PROCEDURE — 80048 BASIC METABOLIC PNL TOTAL CA: CPT | Performed by: HOSPITALIST

## 2022-06-22 PROCEDURE — C1732 CATH, EP, DIAG/ABL, 3D/VECT: HCPCS | Performed by: INTERNAL MEDICINE

## 2022-06-22 PROCEDURE — 92960 CARDIOVERSION ELECTRIC EXT: CPT | Performed by: INTERNAL MEDICINE

## 2022-06-22 PROCEDURE — C1893 INTRO/SHEATH, FIXED,NON-PEEL: HCPCS | Performed by: INTERNAL MEDICINE

## 2022-06-22 PROCEDURE — 74176 CT ABD & PELVIS W/O CONTRAST: CPT

## 2022-06-22 PROCEDURE — 85347 COAGULATION TIME ACTIVATED: CPT

## 2022-06-22 PROCEDURE — 93655 ICAR CATH ABLTJ DSCRT ARRHYT: CPT | Performed by: INTERNAL MEDICINE

## 2022-06-22 PROCEDURE — 99232 SBSQ HOSP IP/OBS MODERATE 35: CPT | Performed by: HOSPITALIST

## 2022-06-22 PROCEDURE — 83735 ASSAY OF MAGNESIUM: CPT | Performed by: HOSPITALIST

## 2022-06-22 PROCEDURE — 02583ZZ DESTRUCTION OF CONDUCTION MECHANISM, PERCUTANEOUS APPROACH: ICD-10-PCS | Performed by: INTERNAL MEDICINE

## 2022-06-22 PROCEDURE — C1759 CATH, INTRA ECHOCARDIOGRAPHY: HCPCS | Performed by: INTERNAL MEDICINE

## 2022-06-22 PROCEDURE — 76937 US GUIDE VASCULAR ACCESS: CPT | Performed by: INTERNAL MEDICINE

## 2022-06-22 PROCEDURE — 93462 L HRT CATH TRNSPTL PUNCTURE: CPT | Performed by: INTERNAL MEDICINE

## 2022-06-22 PROCEDURE — 85027 COMPLETE CBC AUTOMATED: CPT | Performed by: HOSPITALIST

## 2022-06-22 PROCEDURE — 93010 ELECTROCARDIOGRAM REPORT: CPT | Performed by: INTERNAL MEDICINE

## 2022-06-22 RX ORDER — FENTANYL CITRATE/PF 50 MCG/ML
25 SYRINGE (ML) INJECTION
Status: COMPLETED | OUTPATIENT
Start: 2022-06-22 | End: 2022-06-22

## 2022-06-22 RX ORDER — PROPOFOL 10 MG/ML
INJECTION, EMULSION INTRAVENOUS AS NEEDED
Status: DISCONTINUED | OUTPATIENT
Start: 2022-06-22 | End: 2022-06-22

## 2022-06-22 RX ORDER — SODIUM CHLORIDE 9 MG/ML
INJECTION, SOLUTION INTRAVENOUS CONTINUOUS PRN
Status: DISCONTINUED | OUTPATIENT
Start: 2022-06-22 | End: 2022-06-22

## 2022-06-22 RX ORDER — ONDANSETRON 2 MG/ML
4 INJECTION INTRAMUSCULAR; INTRAVENOUS ONCE AS NEEDED
Status: COMPLETED | OUTPATIENT
Start: 2022-06-22 | End: 2022-06-22

## 2022-06-22 RX ORDER — TRAMADOL HYDROCHLORIDE 50 MG/1
50 TABLET ORAL EVERY 6 HOURS PRN
Status: DISCONTINUED | OUTPATIENT
Start: 2022-06-22 | End: 2022-06-23 | Stop reason: HOSPADM

## 2022-06-22 RX ORDER — PROTAMINE SULFATE 10 MG/ML
INJECTION, SOLUTION INTRAVENOUS AS NEEDED
Status: DISCONTINUED | OUTPATIENT
Start: 2022-06-22 | End: 2022-06-22

## 2022-06-22 RX ORDER — ONDANSETRON 2 MG/ML
INJECTION INTRAMUSCULAR; INTRAVENOUS AS NEEDED
Status: DISCONTINUED | OUTPATIENT
Start: 2022-06-22 | End: 2022-06-22

## 2022-06-22 RX ORDER — HYDROMORPHONE HCL/PF 1 MG/ML
0.5 SYRINGE (ML) INJECTION
Status: DISCONTINUED | OUTPATIENT
Start: 2022-06-22 | End: 2022-06-22

## 2022-06-22 RX ORDER — SODIUM CHLORIDE, SODIUM GLUCONATE, SODIUM ACETATE, POTASSIUM CHLORIDE, MAGNESIUM CHLORIDE, SODIUM PHOSPHATE, DIBASIC, AND POTASSIUM PHOSPHATE .53; .5; .37; .037; .03; .012; .00082 G/100ML; G/100ML; G/100ML; G/100ML; G/100ML; G/100ML; G/100ML
500 INJECTION, SOLUTION INTRAVENOUS ONCE
Status: DISCONTINUED | OUTPATIENT
Start: 2022-06-22 | End: 2022-06-22

## 2022-06-22 RX ORDER — MIDAZOLAM HYDROCHLORIDE 2 MG/2ML
INJECTION, SOLUTION INTRAMUSCULAR; INTRAVENOUS AS NEEDED
Status: DISCONTINUED | OUTPATIENT
Start: 2022-06-22 | End: 2022-06-22

## 2022-06-22 RX ORDER — SODIUM CHLORIDE 9 MG/ML
50 INJECTION, SOLUTION INTRAVENOUS CONTINUOUS
Status: DISPENSED | OUTPATIENT
Start: 2022-06-22 | End: 2022-06-23

## 2022-06-22 RX ORDER — HEPARIN SODIUM 1000 [USP'U]/ML
INJECTION, SOLUTION INTRAVENOUS; SUBCUTANEOUS AS NEEDED
Status: DISCONTINUED | OUTPATIENT
Start: 2022-06-22 | End: 2022-06-22 | Stop reason: HOSPADM

## 2022-06-22 RX ORDER — LIDOCAINE HYDROCHLORIDE 10 MG/ML
INJECTION, SOLUTION EPIDURAL; INFILTRATION; INTRACAUDAL; PERINEURAL AS NEEDED
Status: DISCONTINUED | OUTPATIENT
Start: 2022-06-22 | End: 2022-06-22

## 2022-06-22 RX ORDER — DOCUSATE SODIUM 100 MG/1
100 CAPSULE, LIQUID FILLED ORAL 2 TIMES DAILY
Status: DISCONTINUED | OUTPATIENT
Start: 2022-06-22 | End: 2022-06-23 | Stop reason: HOSPADM

## 2022-06-22 RX ORDER — ROCURONIUM BROMIDE 10 MG/ML
INJECTION, SOLUTION INTRAVENOUS AS NEEDED
Status: DISCONTINUED | OUTPATIENT
Start: 2022-06-22 | End: 2022-06-22

## 2022-06-22 RX ORDER — SODIUM CHLORIDE 9 MG/ML
50 INJECTION, SOLUTION INTRAVENOUS CONTINUOUS
Status: DISCONTINUED | OUTPATIENT
Start: 2022-06-22 | End: 2022-06-22

## 2022-06-22 RX ORDER — FENTANYL CITRATE 50 UG/ML
INJECTION, SOLUTION INTRAMUSCULAR; INTRAVENOUS AS NEEDED
Status: DISCONTINUED | OUTPATIENT
Start: 2022-06-22 | End: 2022-06-22

## 2022-06-22 RX ORDER — SUCCINYLCHOLINE/SOD CL,ISO/PF 100 MG/5ML
SYRINGE (ML) INTRAVENOUS AS NEEDED
Status: DISCONTINUED | OUTPATIENT
Start: 2022-06-22 | End: 2022-06-22

## 2022-06-22 RX ADMIN — PROPOFOL 100 MG: 10 INJECTION, EMULSION INTRAVENOUS at 10:34

## 2022-06-22 RX ADMIN — FUROSEMIDE 20 MG: 20 TABLET ORAL at 08:23

## 2022-06-22 RX ADMIN — FENTANYL CITRATE 50 MCG: 50 INJECTION INTRAMUSCULAR; INTRAVENOUS at 11:00

## 2022-06-22 RX ADMIN — MIDAZOLAM 2 MG: 1 INJECTION INTRAMUSCULAR; INTRAVENOUS at 10:24

## 2022-06-22 RX ADMIN — CEFAZOLIN SODIUM 2000 MG: 2 SOLUTION INTRAVENOUS at 10:30

## 2022-06-22 RX ADMIN — FENTANYL CITRATE 50 MCG: 50 INJECTION INTRAMUSCULAR; INTRAVENOUS at 10:30

## 2022-06-22 RX ADMIN — AMIODARONE HYDROCHLORIDE 200 MG: 200 TABLET ORAL at 08:23

## 2022-06-22 RX ADMIN — Medication 25 MCG: at 14:36

## 2022-06-22 RX ADMIN — CEFAZOLIN SODIUM 2000 MG: 2 SOLUTION INTRAVENOUS at 17:21

## 2022-06-22 RX ADMIN — PROPOFOL 50 MG: 10 INJECTION, EMULSION INTRAVENOUS at 10:35

## 2022-06-22 RX ADMIN — NICOTINE 1 PATCH: 21 PATCH, EXTENDED RELEASE TRANSDERMAL at 08:42

## 2022-06-22 RX ADMIN — DOXAZOSIN 2 MG: 2 TABLET ORAL at 22:03

## 2022-06-22 RX ADMIN — SODIUM CHLORIDE 50 ML/HR: 0.9 INJECTION, SOLUTION INTRAVENOUS at 15:52

## 2022-06-22 RX ADMIN — TRAMADOL HYDROCHLORIDE 50 MG: 50 TABLET ORAL at 15:45

## 2022-06-22 RX ADMIN — ONDANSETRON 4 MG: 2 INJECTION INTRAMUSCULAR; INTRAVENOUS at 11:12

## 2022-06-22 RX ADMIN — PROPOFOL 50 MG: 10 INJECTION, EMULSION INTRAVENOUS at 13:14

## 2022-06-22 RX ADMIN — Medication 25 MCG: at 14:25

## 2022-06-22 RX ADMIN — RIVAROXABAN 15 MG: 15 TABLET, FILM COATED ORAL at 17:20

## 2022-06-22 RX ADMIN — METOPROLOL SUCCINATE 150 MG: 100 TABLET, EXTENDED RELEASE ORAL at 17:20

## 2022-06-22 RX ADMIN — LIDOCAINE HYDROCHLORIDE 50 MG: 10 INJECTION, SOLUTION EPIDURAL; INFILTRATION; INTRACAUDAL at 10:34

## 2022-06-22 RX ADMIN — Medication 25 MCG: at 14:03

## 2022-06-22 RX ADMIN — NOREPINEPHRINE BITARTRATE 4 MCG/MIN: 1 INJECTION, SOLUTION, CONCENTRATE INTRAVENOUS at 10:39

## 2022-06-22 RX ADMIN — METOPROLOL SUCCINATE 150 MG: 100 TABLET, EXTENDED RELEASE ORAL at 08:39

## 2022-06-22 RX ADMIN — ROCURONIUM BROMIDE 2 MG: 10 INJECTION INTRAVENOUS at 10:34

## 2022-06-22 RX ADMIN — ONDANSETRON 4 MG: 2 INJECTION INTRAMUSCULAR; INTRAVENOUS at 13:57

## 2022-06-22 RX ADMIN — Medication 100 MG: at 10:35

## 2022-06-22 RX ADMIN — PROTAMINE SULFATE 50 MG: 10 INJECTION, SOLUTION INTRAVENOUS at 13:16

## 2022-06-22 RX ADMIN — TRAMADOL HYDROCHLORIDE 50 MG: 50 TABLET, COATED ORAL at 02:10

## 2022-06-22 RX ADMIN — SODIUM CHLORIDE 50 ML/HR: 0.9 INJECTION, SOLUTION INTRAVENOUS at 17:30

## 2022-06-22 RX ADMIN — SODIUM CHLORIDE: 0.9 INJECTION, SOLUTION INTRAVENOUS at 10:10

## 2022-06-22 RX ADMIN — TRAMADOL HYDROCHLORIDE 50 MG: 50 TABLET ORAL at 22:03

## 2022-06-22 RX ADMIN — Medication 25 MCG: at 13:55

## 2022-06-22 RX ADMIN — TRAMADOL HYDROCHLORIDE 50 MG: 50 TABLET, COATED ORAL at 08:22

## 2022-06-22 NOTE — PROGRESS NOTES
INTERNAL MEDICINE RESIDENCY PROGRESS NOTE     Name: Elder Duarte   Age & Sex: 62 y o  female   MRN: [id number]  Unit/Bed#: BE CATH LAB ROOM   Encounter: [id number]  Team: SLIM    PATIENT INFORMATION     Name: Elder Duarte   Age & Sex: 62 y o  female   MRN: [id number]  Hospital Stay Days: 5    ASSESSMENT/PLAN     Principal Problem:    Atypical atrial flutter ([address] )  Active Problems:    Gastroesophageal reflux disease without esophagitis    Headache    Nicotine dependence    Cocaine abuse (HCC)    CKD (chronic kidney disease) stage 3, GFR 30-59 ml/min ([facility])    Benign hypertension with CKD (chronic kidney disease) stage III ([facility])    Leucopenia      * Atypical atrial flutter [facility])  Assessment & Plan  Patient sent from Cardiology office due to atrial flutter  As per cardiology progress note, patient noted to be in a flutter since 4/22 on device interrogation  In setting of history of AF s/p cryoablation with pulmonary vein isolation in 5/2020  After this, she was noted to have tachycardia-associated cardiomyopathy with increased AF burden  On admission, she required cardizem drip for rate control which has since been discontinued 2/2 no effect  Troponins negative  Patient has been continued on her outpatient amiodarone and Toprol with plan for A flutter ablation (as rate control is unlikely with medications alone)  Tele has shown intermittent ventricular ectopy, which was previously discussed with EP and is possibly SVT with aberrancy rather than NSVT      - Continue telemetry  - Electrophysiology consult appreciated  - Ablation today  - Continue with Xarelto postop    Leucopenia  Assessment & Plan  Stable  Outpatient evaluation     Benign hypertension with CKD (chronic kidney disease) stage III [facility])  Assessment & Plan  Lab Results   Component Value Date    EGFR 33 06/21/2022    EGFR 33 06/20/2022    EGFR 33 06/18/2022    CREATININE 1 66 (H) 06/21/2022    CREATININE 1 66 (H) 06/20/2022    CREATININE 1 66 (H) 2022     - Hesitant to give IVF given history of tachy induced cardiomyopathy  - Continue toprol 150mg BID, doxazosin 2mg daily  - Unsure why home amlodipine is held - may restart s/p ablation    CKD (chronic kidney disease) stage 3, GFR 30-59 ml/min [facility])  Assessment & Plan  Lab Results   Component Value Date    EGFR 29 2022    EGFR 33 2022    EGFR 33 2022    CREATININE 1 87 (H) 2022    CREATININE 1 66 (H) 2022    CREATININE 1 66 (H) 2022     · Elevation ISO tramadol use and NPO status for procedure  · Will give 500cc isolyte bolus  · Monitor daily    Cocaine abuse [facility])  Assessment & Plan  Recent UDS was positive for cocaine in April of this year    Nicotine dependence  Assessment & Plan  On nicotine replacement  Headache  Assessment & Plan  · Fioricet PRN  · Avoid Tramadol iso DANIELA    Gastroesophageal reflux disease without esophagitis  Assessment & Plan  Continue with Mylanta as needed  Disposition: ablation today    SUBJECTIVE     Patient seen and examined  No acute events overnight  Patient only complaining of headache this morning  Not controlled with PRN tramadol  Has not tried PRN Fioricet  Otherwise denies chest pain, shortness a breath, lightheadedness/dizziness, headaches, abdominal pain, nausea/vomiting  OBJECTIVE     Vitals:    22 0302 22 0704 22 0725 22 0839   BP: 147/91 164/95  148/70   Pulse: (!) 53 (!) 51  103   Resp:  18     Temp: 97 7 °F (36 5 °C) 97 9 °F (36 6 °C)     TempSrc:       SpO2: 92% 94% 92%    Weight:       Height:          Temperature:   Temp (24hrs), Av 8 °F (36 6 °C), Min:97 6 °F (36 4 °C), Max:98 °F (36 7 °C)    Temperature: 97 9 °F (36 6 °C)  Intake & Output:  I/O        0701   0700  0701   07 07 0700    P  O  620 930     Total Intake(mL/kg) 620 (6 7) 930 (10 1)     Urine (mL/kg/hr) 1200 (0 5) 1200 (0 5)     Total Output 1200 1200     Net -723 -642 Weights:        Body mass index is 31 95 kg/m²  Weight (last 2 days)     None        Physical Exam  Vitals and nursing note reviewed  Constitutional:       General: She is not in acute distress  Appearance: Normal appearance  She is well-developed  HENT:      Head: Normocephalic and atraumatic  Eyes:      Conjunctiva/sclera: Conjunctivae normal    Cardiovascular:      Rate and Rhythm: Normal rate  Rhythm irregular  Heart sounds: S1 normal and S2 normal  No murmur heard  Pulmonary:      Effort: Pulmonary effort is normal  No respiratory distress  Breath sounds: Normal breath sounds  Abdominal:      General: Bowel sounds are normal  There is no distension  Palpations: Abdomen is soft  Tenderness: There is no abdominal tenderness  Musculoskeletal:      Cervical back: Neck supple  Right lower leg: No edema  Left lower leg: No edema  Skin:     General: Skin is warm and dry  Neurological:      Mental Status: She is alert and oriented to person, place, and time  Psychiatric:         Mood and Affect: Mood normal          Thought Content: Thought content normal        LABORATORY DATA     Labs: I have personally reviewed pertinent reports    Results from last 7 days   Lab Units 06/22/22  0429 06/21/22  0522 06/20/22  0436 06/18/22  0456 06/17/22  1810   WBC Thousand/uL 4 71 3 34* 3 14* 3 89* 4 12*   HEMOGLOBIN g/dL 12 5 11 5 11 1* 11 2* 11 5   HEMATOCRIT % 39 6 37 4 36 1 35 7 36 8   PLATELETS Thousands/uL 143* 128* 120* 116* 133*   NEUTROS PCT %  --  54  --  55 60   MONOS PCT %  --  8  --  7 11      Results from last 7 days   Lab Units 06/22/22  0429 06/21/22  0522 06/20/22  0436 06/18/22  0456   POTASSIUM mmol/L 4 0 4 2 4 3 3 8   CHLORIDE mmol/L 105 106 109* 111*   CO2 mmol/L 30 31 28 22   BUN mg/dL 27* 22 18 17   CREATININE mg/dL 1 87* 1 66* 1 66* 1 66*   CALCIUM mg/dL 9 1 9 0 8 6 8 7   ALK PHOS U/L  --   --   --  53   ALT U/L  --   --   --  18   AST U/L  --   --   -- 17     Results from last 7 days   Lab Units 06/22/22  0429 06/21/22  0522 06/20/22  0436   MAGNESIUM mg/dL 2 2 2 3 2 0     Results from last 7 days   Lab Units 06/18/22  0456   PHOSPHORUS mg/dL 3 1      Results from last 7 days   Lab Units 06/18/22  0456   INR  1 14   PTT seconds 33               IMAGING & DIAGNOSTIC TESTING     Radiology Results: I have personally reviewed pertinent reports  XR chest 2 views    Result Date: 6/17/2022  Impression: Mild emphysematous changes and subtle bibasilar atelectasis noted without pneumothorax or lobar airspace consolidation  Workstation performed: [id number]     CT head without contrast    Result Date: 6/17/2022  Impression: No acute intracranial abnormality  Workstation performed: [id number]     Other Diagnostic Testing: I have personally reviewed pertinent reports      ACTIVE MEDICATIONS     Current Facility-Administered Medications   Medication Dose Route Frequency    acetaminophen (TYLENOL) tablet 650 mg  650 mg Oral Q6H PRN    aluminum-magnesium hydroxide-simethicone (MYLANTA) oral suspension 30 mL  30 mL Oral Q6H PRN    amiodarone tablet 200 mg  200 mg Oral Daily    butalbital-acetaminophen-caffeine (FIORICET,ESGIC) -40 mg per tablet 1 tablet  1 tablet Oral Q4H PRN    ceFAZolin (ANCEF) IVPB (premix in dextrose) 2,000 mg 50 mL  2,000 mg Intravenous Q8H    docusate sodium (COLACE) capsule 100 mg  100 mg Oral BID PRN    doxazosin (CARDURA) tablet 2 mg  2 mg Oral HS    furosemide (LASIX) tablet 20 mg  20 mg Oral Daily    heparin (porcine) injection    PRN    metoprolol succinate (TOPROL-XL) 24 hr tablet 150 mg  150 mg Oral BID    multi-electrolyte (ISOLYTE-S PH 7 4) bolus 500 mL  500 mL Intravenous Once    nicotine (NICODERM CQ) 21 mg/24 hr TD 24 hr patch 1 patch  1 patch Transdermal Daily    ondansetron (ZOFRAN) injection 4 mg  4 mg Intravenous Q6H PRN    rivaroxaban (XARELTO) tablet 15 mg  15 mg Oral After Dinner    traMADol (ULTRAM) tablet 50 mg  50 mg Oral Q6H PRN     Facility-Administered Medications Ordered in Other Encounters   Medication Dose Route Frequency    fentanyl citrate (PF) 100 MCG/2ML   Intravenous PRN    lidocaine (PF) (XYLOCAINE-MPF) 1 % injection   Intravenous PRN    midazolam (VERSED) injection   Intravenous PRN    norepinephrine (LEVOPHED) 4 mg (STANDARD CONCENTRATION) IV in sodium chloride 0 9% 250 mL   Intravenous Continuous PRN    ondansetron (ZOFRAN) injection   Intravenous PRN    phenylephrine bolus from bag   Intravenous PRN    propofol (DIPRIVAN) 200 MG/20ML bolus injection   Intravenous PRN    ROCuronium (ZEMURON) injection   Intravenous PRN    sodium chloride 0 9 % infusion   Intravenous Continuous PRN    Succinylcholine Chloride 100 mg/5 mL syringe   Intravenous PRN       VTE Pharmacologic Prophylaxis: Reason for no pharmacologic prophylaxis : held eliquis prior to procedure  VTE Mechanical Prophylaxis: sequential compression device    Portions of the record may have been created with voice recognition software  Occasional wrong word or "sound a like" substitutions may have occurred due to the inherent limitations of voice recognition software    Read the chart carefully and recognize, using context, where substitutions have occurred   ==  Allen Lauren, [facility]  Internal Medicine Residency PGY-1

## 2022-06-22 NOTE — ANESTHESIA POSTPROCEDURE EVALUATION
Post-Op Assessment Note    CV Status:  Stable  Pain Score: 0    Pain management: adequate     Mental Status:  Alert and awake   Hydration Status:  Euvolemic   PONV Controlled:  Controlled   Airway Patency:  Patent      Post Op Vitals Reviewed: Yes      Staff: Anesthesiologist, CRNA         No complications documented      /83 (06/22/22 1349)    Temp 98 4 °F (36 9 °C) (06/22/22 1349)    Pulse 60 (06/22/22 1349)   Resp 20 (06/22/22 1349)    SpO2 96 % (06/22/22 1349)

## 2022-06-22 NOTE — PLAN OF CARE
Problem: Potential for Falls  Goal: Patient will remain free of falls  Description: INTERVENTIONS:  - Educate patient/family on patient safety including physical limitations  - Instruct patient to call for assistance with activity   - Consult OT/PT to assist with strengthening/mobility   - Keep Call bell within reach  - Keep bed low and locked with side rails adjusted as appropriate  - Keep care items and personal belongings within reach  - Initiate and maintain comfort rounds  - Make Fall Risk Sign visible to staff  - Offer Toileting every 2 Hours, in advance of need  - Initiate/Maintain bed alarm  - Obtain necessary fall risk management equipment:   - Apply yellow socks and bracelet for high fall risk patients  - Consider moving patient to room near nurses station  Outcome: Progressing     Problem: CARDIOVASCULAR - ADULT  Goal: Maintains optimal cardiac output and hemodynamic stability  Description: INTERVENTIONS:  - Monitor I/O, vital signs and rhythm  - Monitor for S/S and trends of decreased cardiac output  - Administer and titrate ordered vasoactive medications to optimize hemodynamic stability  - Assess quality of pulses, skin color and temperature  - Assess for signs of decreased coronary artery perfusion  - Instruct patient to report change in severity of symptoms  Outcome: Progressing  Goal: Absence of cardiac dysrhythmias or at baseline rhythm  Description: INTERVENTIONS:  - Continuous cardiac monitoring, vital signs, obtain 12 lead EKG if ordered  - Administer antiarrhythmic and heart rate control medications as ordered  - Monitor electrolytes and administer replacement therapy as ordered  Outcome: Progressing     Problem: PAIN - ADULT  Goal: Verbalizes/displays adequate comfort level or baseline comfort level  Description: Interventions:  - Encourage patient to monitor pain and request assistance  - Assess pain using appropriate pain scale  - Administer analgesics based on type and severity of pain and evaluate response  - Implement non-pharmacological measures as appropriate and evaluate response  - Consider cultural and social influences on pain and pain management  - Notify physician/advanced practitioner if interventions unsuccessful or patient reports new pain  Outcome: Progressing     Problem: INFECTION - ADULT  Goal: Absence or prevention of progression during hospitalization  Description: INTERVENTIONS:  - Assess and monitor for signs and symptoms of infection  - Monitor lab/diagnostic results  - Monitor all insertion sites, i e  indwelling lines, tubes, and drains  - Monitor endotracheal if appropriate and nasal secretions for changes in amount and color  - [place] appropriate cooling/warming therapies per order  - Administer medications as ordered  - Instruct and encourage patient and family to use good hand hygiene technique  - Identify and instruct in appropriate isolation precautions for identified infection/condition  Outcome: Progressing  Goal: Absence of fever/infection during neutropenic period  Description: INTERVENTIONS:  - Monitor WBC    Outcome: Progressing     Problem: SAFETY ADULT  Goal: Patient will remain free of falls  Description: INTERVENTIONS:  - Educate patient/family on patient safety including physical limitations  - Instruct patient to call for assistance with activity   - Consult OT/PT to assist with strengthening/mobility   - Keep Call bell within reach  - Keep bed low and locked with side rails adjusted as appropriate  - Keep care items and personal belongings within reach  - Initiate and maintain comfort rounds  - Make Fall Risk Sign visible to staff  - Offer Toileting every 2 Hours, in advance of need  - Initiate/Maintain bed alarm  - Obtain necessary fall risk management equipment:   - Apply yellow socks and bracelet for high fall risk patients  - Consider moving patient to room near nurses station  Outcome: Progressing  Goal: Maintain or return to baseline ADL function  Description: INTERVENTIONS:  -  Assess patient's ability to carry out ADLs; assess patient's baseline for ADL function and identify physical deficits which impact ability to perform ADLs (bathing, care of mouth/teeth, toileting, grooming, dressing, etc )  - Assess/evaluate cause of self-care deficits   - Assess range of motion  - Assess patient's mobility; develop plan if impaired  - Assess patient's need for assistive devices and provide as appropriate  - Encourage maximum independence but intervene and supervise when necessary  - Involve family in performance of ADLs  - Assess for home care needs following discharge   - Consider OT consult to assist with ADL evaluation and planning for discharge  - Provide patient education as appropriate  Outcome: Progressing  Goal: Maintains/Returns to pre admission functional level  Description: INTERVENTIONS:  - Perform BMAT or MOVE assessment daily    - Set and communicate daily mobility goal to care team and patient/family/caregiver  - Collaborate with rehabilitation services on mobility goals if consulted  - Perform Range of Motion 3 times a day  - Reposition patient every 2 hours    - Dangle patient 3 times a day  - Stand patient 3 times a day  - Ambulate patient 3 times a day  - Out of bed to chair 3 times a day   - Out of bed for meals 3 times a day  - Out of bed for toileting  - Record patient progress and toleration of activity level   Outcome: Progressing     Problem: DISCHARGE PLANNING  Goal: Discharge to home or other facility with appropriate resources  Description: INTERVENTIONS:  - Identify barriers to discharge w/patient and caregiver  - Arrange for needed discharge resources and transportation as appropriate  - Identify discharge learning needs (meds, wound care, etc )  - Arrange for interpretive services to assist at discharge as needed  - Refer to Case Management Department for coordinating discharge planning if the patient needs post-hospital services based on physician/advanced practitioner order or complex needs related to functional status, cognitive ability, or social support system  Outcome: Progressing     Problem: Knowledge Deficit  Goal: Patient/family/caregiver demonstrates understanding of disease process, treatment plan, medications, and discharge instructions  Description: Complete learning assessment and assess knowledge base    Interventions:  - Provide teaching at level of understanding  - Provide teaching via preferred learning methods  Outcome: Progressing

## 2022-06-22 NOTE — UTILIZATION REVIEW
Continued Stay Review    Date: 6/22/2022                        Current Patient Class: inpatient  Current Level of Care: med/surg  HPI:57 y o  female initially admitted on 6/17 with atypical atrial flutter with RVR   Assessment/Plan: S/p ablation today - now in NSR  Continue current amiodarone, Toprol XL, Xarelto, cont tele, f/u EP recs tomorrow  Echo shows drop in EF to 40-45%, suspect tachycardia mediated   Will need op repeat 2D echocardiogram in 2-3 months  ICD in place  Continue Toprol-XL, Cardura, Lasix   Cr 1 5-1 7, today 1 8, cont to monitor  Cardiac diet  I/Os  OOB/ambulate        Vital Signs:   Date/Time Temp Pulse Resp BP MAP (mmHg) SpO2 Calculated FIO2 (%) - Nasal Cannula O2 Flow Rate (L/min) Nasal Cannula O2 Flow Rate (L/min) O2 Device   06/22/22 1500 97 2 °F (36 2 °C) Abnormal  58 14 160/92 116 98 % -- 2 L/min -- Nasal cannula   06/22/22 1349 98 4 °F (36 9 °C) 60 20 139/83 -- 96 % -- 6 L/min -- Simple mask   06/22/22 03:02:36 97 7 °F (36 5 °C) 53 Abnormal  -- 147/91 110 92 % -- -- -- --   06/21/22 2255 -- 97 -- -- -- 94 % -- -- -- None (Room air)   06/21/22 22:16:20 98 °F (36 7 °C) 125 Abnormal  18 148/112 Abnormal  124 93 % -- -- -- None (Room air)   06/20/22 23:03:06 97 3 °F (36 3 °C) Abnormal  71 -- 154/90 111 94 % -- -- -- --   06/20/22 21:29:05 -- 102 -- 154/114 Abnormal  127 94 % -- -- -- --   06/20/22 16:56:24 -- 121 Abnormal  -- 160/120 Abnormal  133 96 % -- -- -- --   06/20/22 14:43:20 97 7 °F (36 5 °C) 118 Abnormal  -- 157/113 Abnormal  128 95 % -- -- -- --   06/20/22 10:49:33 97 8 °F (36 6 °C) 105 -- 154/105 Abnormal  121 92 % -- -- -- --   06/20/22 02:40:34 97 4 °F (36 3 °C) Abnormal  52 Abnormal  -- 153/99 117 91 % -- -- -- --       Pertinent Labs/Diagnostic Results:     Results from last 7 days   Lab Units 06/22/22  0429 06/21/22  0522 06/20/22  0436 06/18/22  0456 06/17/22  1810   WBC Thousand/uL 4 71 3 34* 3 14* 3 89* 4 12*   HEMOGLOBIN g/dL 12 5 11 5 11 1* 11 2* 11 5   HEMATOCRIT % 39 6 37 4 36 1 35 7 36 8   PLATELETS Thousands/uL 143* 128* 120* 116* 133*   NEUTROS ABS Thousands/µL  --  1 81*  --  2 15 2 54     Results from last 7 days   Lab Units 06/22/22 0429 06/21/22 0522 06/20/22 0436 06/18/22 0456 06/17/22  1810   SODIUM mmol/L 137 139 138 140 141   POTASSIUM mmol/L 4 0 4 2 4 3 3 8 3 8   CHLORIDE mmol/L 105 106 109* 111* 110*   CO2 mmol/L 30 31 28 22 25   ANION GAP mmol/L 2* 2* 1* 7 6   BUN mg/dL 27* 22 18 17 19   CREATININE mg/dL 1 87* 1 66* 1 66* 1 66* 2 01*   EGFR ml/min/1 73sq m 29 33 33 33 26   CALCIUM mg/dL 9 1 9 0 8 6 8 7 8 9   MAGNESIUM mg/dL 2 2 2 3 2 0 1 9  --    PHOSPHORUS mg/dL  --   --   --  3 1  --      Results from last 7 days   Lab Units 06/22/22 0429 06/21/22 0522 06/20/22 0436 06/18/22  0456 06/17/22  1810   GLUCOSE RANDOM mg/dL 88 74 78 88 79       Medications:   Scheduled Medications:  amiodarone, 200 mg, Oral, Daily  cefazolin, 2,000 mg, Intravenous, Q8H  docusate sodium, 100 mg, Oral, BID  doxazosin, 2 mg, Oral, HS  furosemide, 20 mg, Oral, Daily  metoprolol succinate, 150 mg, Oral, BID  multi-electrolyte, 500 mL, Intravenous, Once  nicotine, 1 patch, Transdermal, Daily  rivaroxaban, 15 mg, Oral, After Dinner    Continuous IV Infusions:  sodium chloride, 50 mL/hr, Intravenous, Continuous    PRN Meds:  acetaminophen, 650 mg, Oral, Q6H PRN 6/21 x1  aluminum-magnesium hydroxide-simethicone, 30 mL, Oral, Q6H PRN  butalbital-acetaminophen-caffeine, 1 tablet, Oral, Q4H PRN  docusate sodium, 100 mg, Oral, BID PRN  HYDROmorphone, 0 5 mg, Intravenous, Q5 Min PRN  ondansetron, 4 mg, Intravenous, Q6H PRN 6/22 x1  traMADol, 50 mg, Oral, Q6H PRN 6/20 x3, 6/21 x3, 6/22 x3 then d/c'd        Discharge Plan: TBD    Network Utilization Review Department  ATTENTION: Please call with any questions or concerns to [phone] and carefully listen to the prompts so that you are directed to the right person   All voicemails are confidential   Toyin Sherman all requests for admission clinical reviews, approved or denied determinations and any other requests to dedicated fax number below belonging to the campus where the patient is receiving treatment   List of dedicated fax numbers for the Facilities:  [address] [place] [address] DENIALS (Administrative/Medical Necessity) [phone]   [address] (Maternity/NICU/Pediatrics) [phone]   [address] [place] [address]  [address] [phone] [address] [facility] [place] [address] [phone]

## 2022-06-23 VITALS
BODY MASS INDEX: 32.02 KG/M2 | RESPIRATION RATE: 16 BRPM | HEART RATE: 63 BPM | SYSTOLIC BLOOD PRESSURE: 163 MMHG | WEIGHT: 204 LBS | HEIGHT: 67 IN | OXYGEN SATURATION: 97 % | TEMPERATURE: 97.8 F | DIASTOLIC BLOOD PRESSURE: 91 MMHG

## 2022-06-23 PROBLEM — K57.32 DIVERTICULITIS OF LARGE INTESTINE WITHOUT PERFORATION OR ABSCESS: Status: ACTIVE | Noted: 2022-06-23

## 2022-06-23 LAB
ANION GAP SERPL CALCULATED.3IONS-SCNC: 8 MMOL/L (ref 4–13)
BUN SERPL-MCNC: 24 MG/DL (ref 5–25)
CALCIUM SERPL-MCNC: 8.5 MG/DL (ref 8.3–10.1)
CHLORIDE SERPL-SCNC: 107 MMOL/L (ref 100–108)
CO2 SERPL-SCNC: 23 MMOL/L (ref 21–32)
CREAT SERPL-MCNC: 1.84 MG/DL (ref 0.6–1.3)
ERYTHROCYTE [DISTWIDTH] IN BLOOD BY AUTOMATED COUNT: 15.2 % (ref 11.6–15.1)
GFR SERPL CREATININE-BSD FRML MDRD: 29 ML/MIN/1.73SQ M
GLUCOSE SERPL-MCNC: 77 MG/DL (ref 65–140)
HCT VFR BLD AUTO: 32.8 % (ref 34.8–46.1)
HGB BLD-MCNC: 9.9 G/DL (ref 11.5–15.4)
MCH RBC QN AUTO: 26.1 PG (ref 26.8–34.3)
MCHC RBC AUTO-ENTMCNC: 30.2 G/DL (ref 31.4–37.4)
MCV RBC AUTO: 87 FL (ref 82–98)
PLATELET # BLD AUTO: 123 THOUSANDS/UL (ref 149–390)
PMV BLD AUTO: 11.4 FL (ref 8.9–12.7)
POTASSIUM SERPL-SCNC: 4.3 MMOL/L (ref 3.5–5.3)
RBC # BLD AUTO: 3.79 MILLION/UL (ref 3.81–5.12)
SODIUM SERPL-SCNC: 138 MMOL/L (ref 136–145)
WBC # BLD AUTO: 4.71 THOUSAND/UL (ref 4.31–10.16)

## 2022-06-23 PROCEDURE — 99239 HOSP IP/OBS DSCHRG MGMT >30: CPT | Performed by: HOSPITALIST

## 2022-06-23 PROCEDURE — 85027 COMPLETE CBC AUTOMATED: CPT | Performed by: STUDENT IN AN ORGANIZED HEALTH CARE EDUCATION/TRAINING PROGRAM

## 2022-06-23 PROCEDURE — NC001 PR NO CHARGE: Performed by: PHYSICIAN ASSISTANT

## 2022-06-23 PROCEDURE — 80048 BASIC METABOLIC PNL TOTAL CA: CPT | Performed by: STUDENT IN AN ORGANIZED HEALTH CARE EDUCATION/TRAINING PROGRAM

## 2022-06-23 RX ORDER — SACUBITRIL AND VALSARTAN 49; 51 MG/1; MG/1
1 TABLET, FILM COATED ORAL 2 TIMES DAILY
Qty: 60 TABLET | Refills: 0 | Status: SHIPPED | OUTPATIENT
Start: 2022-06-23

## 2022-06-23 RX ORDER — METOPROLOL SUCCINATE 100 MG/1
100 TABLET, EXTENDED RELEASE ORAL 2 TIMES DAILY
Status: DISCONTINUED | OUTPATIENT
Start: 2022-06-23 | End: 2022-06-23

## 2022-06-23 RX ORDER — AMOXICILLIN AND CLAVULANATE POTASSIUM 875; 125 MG/1; MG/1
1 TABLET, FILM COATED ORAL EVERY 12 HOURS SCHEDULED
Qty: 10 TABLET | Refills: 0 | Status: SHIPPED | OUTPATIENT
Start: 2022-06-23 | End: 2022-06-23 | Stop reason: SDUPTHER

## 2022-06-23 RX ORDER — AMIODARONE HYDROCHLORIDE 200 MG/1
100 TABLET ORAL DAILY
Status: DISCONTINUED | OUTPATIENT
Start: 2022-06-24 | End: 2022-06-23 | Stop reason: HOSPADM

## 2022-06-23 RX ORDER — AMOXICILLIN AND CLAVULANATE POTASSIUM 875; 125 MG/1; MG/1
1 TABLET, FILM COATED ORAL EVERY 12 HOURS SCHEDULED
Qty: 10 TABLET | Refills: 0 | Status: SHIPPED | OUTPATIENT
Start: 2022-06-23 | End: 2022-06-28

## 2022-06-23 RX ORDER — AMIODARONE HYDROCHLORIDE 100 MG/1
100 TABLET ORAL DAILY
Qty: 90 TABLET | Refills: 1 | Status: SHIPPED | OUTPATIENT
Start: 2022-06-24 | End: 2022-06-23 | Stop reason: SDUPTHER

## 2022-06-23 RX ORDER — AMIODARONE HYDROCHLORIDE 100 MG/1
100 TABLET ORAL DAILY
Qty: 90 TABLET | Refills: 0 | Status: SHIPPED | OUTPATIENT
Start: 2022-06-24

## 2022-06-23 RX ADMIN — METOPROLOL SUCCINATE 75 MG: 50 TABLET, EXTENDED RELEASE ORAL at 08:19

## 2022-06-23 RX ADMIN — ACETAMINOPHEN 650 MG: 325 TABLET, FILM COATED ORAL at 01:20

## 2022-06-23 RX ADMIN — TRAMADOL HYDROCHLORIDE 50 MG: 50 TABLET ORAL at 04:54

## 2022-06-23 RX ADMIN — FUROSEMIDE 20 MG: 20 TABLET ORAL at 08:19

## 2022-06-23 RX ADMIN — AMIODARONE HYDROCHLORIDE 200 MG: 200 TABLET ORAL at 08:16

## 2022-06-23 RX ADMIN — NICOTINE 1 PATCH: 21 PATCH, EXTENDED RELEASE TRANSDERMAL at 08:16

## 2022-06-23 RX ADMIN — CEFAZOLIN SODIUM 2000 MG: 2 SOLUTION INTRAVENOUS at 01:05

## 2022-06-23 NOTE — CASE MANAGEMENT
Case Management Discharge Planning Note    Patient name William Evans  Location [address] 331[address]69 MRN [id number]  : 1965 Date 2022       Current Admission Date: 2022  Current Admission Diagnosis:Atypical atrial flutter [facility])   Patient Active Problem List    Diagnosis Date Noted    Diverticulitis of large intestine without perforation or abscess 2022    Leucopenia 2022    Leg edema 2022    Benign hypertension with CKD (chronic kidney disease) stage III ([facility] ) 2022    Nephrolithiasis 2022    Hypertensive crisis 2022    Electrolyte abnormality 2022    CKD (chronic kidney disease) stage 3, GFR 30-59 ml/min ([facility] ) 2022    A[facility] ) 2022    Hypertensive urgency 2022    Hepatitis C 2022    Acute right flank pain 08/15/2021    Epigastric abdominal tenderness 08/15/2021    Thrombocytopenia ([facility] ) 2021    Leg swelling 2020    Paroxysmal A-fib ([facility] ) 2020    Cocaine abuse ([facility] ) 2020    Acute on chronic combined systolic and diastolic congestive heart failure ([facility] ) 2020    Elevated lipase 10/10/2019    Palpitations 2019    Elevated troponin 2019    Hypertrophic cardiomyopathy ([facility] ) 2019    Headache 2018    DANIELA (acute kidney injury) ([facility] ) 2018    Nicotine dependence 2018    NSTEMI (non-ST elevated myocardial infarction) ([facility] ) 2018    ICD (implantable cardioverter-defibrillator) discharge 05/10/2017    History of ventricular tachycardia ([facility] ) with ICD 2016    Chest pain at rest 2016    Hyperlipidemia 2016    Uncontrolled hypertension 2016    Gastroesophageal reflux disease without esophagitis 2016      LOS (days): 6  Geometric Mean LOS (GMLOS) (days):   Days to GMLOS:     OBJECTIVE:  Risk of Unplanned Readmission Score: 33 37         Current admission status: Inpatient   Preferred Pharmacy: [facility] [facility]  [address] [facility]  [address] [address]  Phone: [phone] Fax: [phone]    Primary Care Provider: Marciano Torres MD    Primary Insurance: Cliff Carter  Secondary Insurance:     DISCHARGE DETAILS:          Comments - Freedom of Choice: No HHC needs                               Other Referral/Resources/Interventions Provided:  Referral Comments: No HHC needs         Treatment Team Recommendation: Home  Discharge Destination Plan[de-identified] Home  Transport at Discharge : Family                             IMM Given (Date)::  (NA)        Additional Comments: Had aflutter ablation, doing well, ambulating well  No HHC needs  Home with  transporting

## 2022-06-23 NOTE — DISCHARGE SUMMARY
[address]    Discharging Physician / Practitioner: Saumya Vaughn DO  PCP: Monica Izquierdo MD  Admission Date:   Admission Orders (From admission, onward)     Ordered        06/17/22 2001  Inpatient Admission  Once                      Discharge Date: 06/23/22    * Atypical atrial flutter [facility])  Assessment & Plan  Patient sent from Cardiology office due to atrial flutter  As per cardiology progress note, patient noted to be in a flutter since 4/22 on device interrogation  In setting of history of AF s/p cryoablation with pulmonary vein isolation in 5/2020  After this, she was noted to have tachycardia-associated cardiomyopathy with increased AF burden  On admission, she required cardizem drip for rate control which has since been discontinued 2/2 no effect  Troponins negative  Patient has been continued on her outpatient amiodarone and Toprol and is now s/p A flutter ablation  Tele has shown no events since her procedure      - Continue telemetry  - Electrophysiology consult appreciated  --- Continue amiodarone 100mg daily  --- Continue home doses of metoprolol, Xarelto, entresto  --- Strict outpatient follow up  --- Hb drop noted postprocedure without concern for RP bleed or effusion    Diverticulitis of large intestine without perforation or abscess  Assessment & Plan  Incidental finding on CT abdomen/pelvis  Patient without active symptoms  Will prescribe prophylactic Augmentin to prevent flare    Leucopenia  Assessment & Plan  Stable  Outpatient evaluation     Benign hypertension with CKD (chronic kidney disease) stage III [facility])  Assessment & Plan  Lab Results   Component Value Date    EGFR 33 06/21/2022    EGFR 33 06/20/2022    EGFR 33 06/18/2022    CREATININE 1 66 (H) 06/21/2022    CREATININE 1 66 (H) 06/20/2022    CREATININE 1 66 (H) 06/18/2022     - Hesitant to give IVF given history of tachy induced cardiomyopathy  - Continue toprol 75mg BID, doxazosin 2mg daily  - Unsure why home amlodipine is held - restart on discharge  - Medically stable for discharge    CKD (chronic kidney disease) stage 3, GFR 30-59 ml/min [facility])  Assessment & Plan  Lab Results   Component Value Date    EGFR 29 06/23/2022    EGFR 29 06/22/2022    EGFR 33 06/21/2022    CREATININE 1 84 (H) 06/23/2022    CREATININE 1 87 (H) 06/22/2022    CREATININE 1 66 (H) 06/21/2022     · Elevation ISO tramadol use and NPO status for procedure  · Baseline seems to be 1 6-1 8 prior to this hospitalization  · IVF as needed  · Monitor daily  · Medically stable for discharge    Cocaine abuse [facility])  Assessment & Plan  Recent UDS was positive for cocaine in April of this year    Nicotine dependence  Assessment & Plan  On nicotine replacement  Headache  Assessment & Plan  · Fioricet PRN is helping  · May need outpatient workup if headaches persist    Gastroesophageal reflux disease without esophagitis  Assessment & Plan  Continue with Mylanta as needed  Medical Problems             Resolved Problems  Date Reviewed: 6/20/2022   None                 Consultations During Hospital Stay:  · Electrophysiology    Procedures Performed:   · Atrial flutter ablation    Significant Findings / Test Results:   · None    Incidental Findings:   · Uncomplicated sigmoid diverticulitis    Test Results Pending at Discharge (will require follow up): · None     Outpatient Tests Requested:  · None    Complications:  None    Reason for Admission:  Atrial flutter with RVR    Hospital Course:   HPI from H and P by Dr Lenore Chao on 6/17: "Varsha Salguero is a 62 y o  female who has past medical history significant for atrial fibrillations post ablation with days history of hypertrophic cardiomyopathy  She has has a history, nicotine use, hypertension, and headaches  She came from the cardiology office and was examined seen atrial flutter  Patient has on and off headache and reports palpitations  However denies any chest pain  No nausea vomiting    Patient also denies having any neurologic signs or symptoms  She was therefore sent to [facility] for further monitoring and to control the atrial flutter  Patient is placed NPO and rivaroxaban is put on hold as she may need to undergo ablation tomorrow      Despite Cardiology note that states to be cautious with IV diltiazem given prior EF of 30%; this has been cleared by emergency room physicians with Cardiology disease okay to use after metoprolol was administered with no improvement "    Patient underwent ablation for atrial flutter with successful conversion to normal sinus rhythm  Patient V paced postprocedure with maintenance of appropriate HR and hemodynamic stability  She complained of new low back pain after procedure  CT abdomen pelvis revealed incidental finding of uncomplicated sigmoid diverticulitis for which patient complained of very intermittent lower abdominal pain  She is medically stable for discharge with electrophysiology approval   For additional details of hospital stay, please see progress notes  Please see above list of diagnoses and related plan for additional information  Condition at Discharge: good     Discharge Day Visit / Exam:     Subjective: On day of discharge, claims low back pain and headaches are still present but significantly improved  She otherwise denies chest pain, dyspnea, palpitations, lightheadedness, dizziness, abdominal pain, nausea/vomiting  Vitals: Blood Pressure: 163/91 (06/23/22 1121)  Pulse: 63 (06/23/22 1121)  Temperature: 97 8 °F (36 6 °C) (06/23/22 1121)  Temp Source: Oral (06/23/22 0819)  Respirations: 16 (06/23/22 0819)  Height: 5' 7" (170 2 cm) (06/19/22 9751)  Weight - Scale: 92 5 kg (204 lb) (06/19/22 0712)  SpO2: 97 % (06/23/22 1121)  Exam:   Physical Exam  Vitals and nursing note reviewed  Constitutional:       General: She is not in acute distress  Appearance: Normal appearance  She is well-developed     HENT:      Head: Normocephalic and atraumatic  Eyes:      Conjunctiva/sclera: Conjunctivae normal    Cardiovascular:      Rate and Rhythm: Normal rate and regular rhythm  Heart sounds: S1 normal and S2 normal  No murmur heard  Pulmonary:      Effort: Pulmonary effort is normal  No respiratory distress  Breath sounds: Examination of the right-lower field reveals rales  Rales present  Comments: Questionable rales  Abdominal:      General: Bowel sounds are normal  There is no distension  Palpations: Abdomen is soft  Tenderness: There is no abdominal tenderness  Musculoskeletal:      Cervical back: Neck supple  Right lower leg: No edema  Left lower leg: No edema  Skin:     General: Skin is warm and dry  Neurological:      Mental Status: She is alert and oriented to person, place, and time  Psychiatric:         Mood and Affect: Mood normal          Thought Content: Thought content normal          Discussion with Family:  Updated  throughout hospital stay    Discharge instructions/Information to patient and family:   See after visit summary for information provided to patient and family  Provisions for Follow-Up Care:  See after visit summary for information related to follow-up care and any pertinent home health orders  Disposition:     Home    For Discharges to [facility] SNF:   · Not Applicable to this Patient - Not Applicable to this Patient    Planned Readmission:  None     Discharge Statement:  I spent 30 minutes discharging the patient  This time was spent on the day of discharge  I had direct contact with the patient on the day of discharge  Greater than 50% of the total time was spent examining patient, answering all patient questions, arranging and discussing plan of care with patient as well as directly providing post-discharge instructions  Additional time then spent on discharge activities      Discharge Medications:  See after visit summary for reconciled discharge medications provided to patient and family        ** Please Note: This note has been constructed using a voice recognition system ** Eucrisa Counseling: Patient may experience a mild burning sensation during topical application. Eucrisa is not approved in children less than 2 years of age.

## 2022-06-23 NOTE — DISCHARGE INSTRUCTIONS
Please take all medications as prescribed  It is very important that you do not miss any doses, or you are at risk for having your symptoms return  Please take the Augmentin as prescribed until you run out of pills  If your headache and low back pain continue or worsen, please see your PCP or go to the ER  If you experience any of the following symptoms, please return to the ER: chest pain, shortness of breath, palpitations, abdominal pain, blood in your stool, pain/redness/swelling at the procedure site, fevers, chills  RESTRICTIONS:   No heavy lifting or strenuous activity for one week  No soaking in a bath tub/hot tub/swimming pool for one week or until groin heals  You may shower - please let soap and water run over the groins, no scrubbing, and pat the area dry  Please place band-aid on groins daily for up to five days, but you may remove sooner if no issues are noted  If you notice ongoing bleeding, swelling, or firm lumps in groin near ablation incision, please contact Dr Arthur Johnson' office - [phone]

## 2022-06-23 NOTE — PROGRESS NOTES
Patient was seen status post atrial flutter ablation by Dr Pedro Luis Rashid on 6/22/2022 for which she had multiple atrial flutters including a roof line flutter  She tolerated the procedure well and complained of incisional soreness only  Groin sutures were removed bilaterally with incisions clean, dry, and intact without signs of ecchymosis or hematoma  Ice packs were applied  She is stable from EP standpoint for discharge at this time with follow-up in 1 month  She is to continue her metoprolol and Entresto along with anticoagulation as previously instructed with the only change being decreasing her amiodarone to 100 mg daily  Stable from an EP standpoint for discharge at this time

## 2022-06-23 NOTE — ASSESSMENT & PLAN NOTE
Incidental finding on CT abdomen/pelvis  Patient without active symptoms  Will prescribe prophylactic Augmentin to prevent flare

## 2022-06-24 NOTE — UTILIZATION REVIEW
Notification of Discharge   This is a Notification of Discharge from our facility [address]  Please be advised that this patient has been discharge from our facility  Below you will find the admission and discharge date and time including the patients disposition  UTILIZATION REVIEW CONTACT:  Markel Cardoso  Utilization   Network Utilization Review Department  Phone: [phone] x carefully listen to the prompts  All voicemails are confidential   Email: [email or web address]     PHYSICIAN ADVISORY SERVICES:  FOR [id number] REVIEW - MEDICAL NECESSITY DENIAL  Phone: [phone]  Fax: [phone]  Email: [email or web address]     PRESENTATION DATE: 6/17/2022  5:52 PM  OBERVATION ADMISSION DATE:  INPATIENT ADMISSION DATE: 6/17/22  8:01 PM   DISCHARGE DATE: 6/23/2022  1:03 PM  DISPOSITION: Home/Self Care Home/Self Care      IMPORTANT INFORMATION:  Send all requests for admission clinical reviews, approved or denied determinations and any other requests to dedicated fax number below belonging to the campus where the patient is receiving treatment   List of dedicated fax numbers:  [address] DENIALS (Administrative/Medical Necessity) [phone]   [address] (Maternity/NICU/Pediatrics) [phone]   Fidel Yadav [phone]   Navarro Murphy [phone]   Alex Gomez [phone]   [address] [phone]   [facility]  [phone]   [address] [place] [address]  2401 [facility] 1000 [facility] [phone]

## 2022-06-25 LAB
AMPHETAMINES UR QL SCN: NEGATIVE NG/ML
BARBITURATES UR QL SCN: POSITIVE
BENZODIAZ UR QL: NEGATIVE NG/ML
BZE UR QL: NEGATIVE NG/ML
CANNABINOIDS UR QL SCN: NEGATIVE NG/ML
METHADONE UR QL SCN: NEGATIVE NG/ML
OPIATES UR QL: NEGATIVE NG/ML
PCP UR QL: NEGATIVE NG/ML
PROPOXYPH UR QL SCN: NEGATIVE NG/ML

## 2022-06-27 ENCOUNTER — TELEPHONE (OUTPATIENT)
Dept: CARDIOLOGY CLINIC | Facility: CLINIC | Age: 57
End: 2022-06-27

## 2022-07-11 ENCOUNTER — APPOINTMENT (EMERGENCY)
Dept: RADIOLOGY | Facility: HOSPITAL | Age: 57
End: 2022-07-11
Payer: COMMERCIAL

## 2022-07-11 ENCOUNTER — HOSPITAL ENCOUNTER (EMERGENCY)
Facility: HOSPITAL | Age: 57
Discharge: HOME | End: 2022-07-11
Attending: EMERGENCY MEDICINE
Payer: COMMERCIAL

## 2022-07-11 VITALS
RESPIRATION RATE: 18 BRPM | HEIGHT: 67 IN | DIASTOLIC BLOOD PRESSURE: 85 MMHG | OXYGEN SATURATION: 98 % | SYSTOLIC BLOOD PRESSURE: 148 MMHG | TEMPERATURE: 98.1 F | WEIGHT: 203 LBS | BODY MASS INDEX: 31.86 KG/M2

## 2022-07-11 DIAGNOSIS — M25.552 LEFT HIP PAIN: ICD-10-CM

## 2022-07-11 DIAGNOSIS — M25.562 ACUTE PAIN OF LEFT KNEE: Primary | ICD-10-CM

## 2022-07-11 PROCEDURE — 73502 X-RAY EXAM HIP UNI 2-3 VIEWS: CPT

## 2022-07-11 PROCEDURE — 99284 EMERGENCY DEPT VISIT MOD MDM: CPT | Performed by: EMERGENCY MEDICINE

## 2022-07-11 PROCEDURE — 99283 EMERGENCY DEPT VISIT LOW MDM: CPT

## 2022-07-11 PROCEDURE — 73564 X-RAY EXAM KNEE 4 OR MORE: CPT

## 2022-07-11 RX ORDER — TRAMADOL HYDROCHLORIDE 50 MG/1
50 TABLET ORAL EVERY 6 HOURS PRN
Qty: 7 TABLET | Refills: 0 | Status: SHIPPED | OUTPATIENT
Start: 2022-07-11 | End: 2022-07-21
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
## 2022-07-18 ENCOUNTER — TELEPHONE (OUTPATIENT)
Dept: CARDIOLOGY CLINIC | Facility: CLINIC | Age: 57
End: 2022-07-18
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
## 2022-07-20 ENCOUNTER — APPOINTMENT (EMERGENCY)
Dept: RADIOLOGY | Facility: HOSPITAL | Age: 57
End: 2022-07-20
Payer: COMMERCIAL

## 2022-07-20 ENCOUNTER — APPOINTMENT (EMERGENCY)
Dept: VASCULAR ULTRASOUND | Facility: HOSPITAL | Age: 57
End: 2022-07-20
Payer: COMMERCIAL

## 2022-07-20 ENCOUNTER — HOSPITAL ENCOUNTER (EMERGENCY)
Facility: HOSPITAL | Age: 57
Discharge: HOME | End: 2022-07-20
Attending: EMERGENCY MEDICINE
Payer: COMMERCIAL

## 2022-07-20 VITALS
TEMPERATURE: 97.5 F | OXYGEN SATURATION: 98 % | HEART RATE: 57 BPM | SYSTOLIC BLOOD PRESSURE: 155 MMHG | DIASTOLIC BLOOD PRESSURE: 88 MMHG | RESPIRATION RATE: 18 BRPM

## 2022-07-20 DIAGNOSIS — M79.604 RIGHT LEG PAIN: Primary | ICD-10-CM

## 2022-07-20 DIAGNOSIS — M25.551 RIGHT HIP PAIN: ICD-10-CM

## 2022-07-20 LAB
ALBUMIN SERPL BCP-MCNC: 4 G/DL (ref 3.5–5)
ALP SERPL-CCNC: 56 U/L (ref 34–104)
ALT SERPL W P-5'-P-CCNC: 18 U/L (ref 7–52)
ANION GAP SERPL CALCULATED.3IONS-SCNC: 5 MMOL/L (ref 4–13)
AST SERPL W P-5'-P-CCNC: 24 U/L (ref 13–39)
BASOPHILS # BLD AUTO: 0.03 THOUSANDS/ΜL (ref 0–0.1)
BASOPHILS NFR BLD AUTO: 1 % (ref 0–1)
BILIRUB SERPL-MCNC: 0.34 MG/DL (ref 0.2–1)
BUN SERPL-MCNC: 27 MG/DL (ref 5–25)
CALCIUM SERPL-MCNC: 9.2 MG/DL (ref 8.4–10.2)
CHLORIDE SERPL-SCNC: 102 MMOL/L (ref 96–108)
CO2 SERPL-SCNC: 29 MMOL/L (ref 21–32)
CREAT SERPL-MCNC: 2.05 MG/DL (ref 0.6–1.3)
EOSINOPHIL # BLD AUTO: 0.09 THOUSAND/ΜL (ref 0–0.61)
EOSINOPHIL NFR BLD AUTO: 2 % (ref 0–6)
ERYTHROCYTE [DISTWIDTH] IN BLOOD BY AUTOMATED COUNT: 16 % (ref 11.6–15.1)
GFR SERPL CREATININE-BSD FRML MDRD: 26 ML/MIN/1.73SQ M
GLUCOSE SERPL-MCNC: 98 MG/DL (ref 65–140)
HCT VFR BLD AUTO: 37.9 % (ref 34.8–46.1)
HGB BLD-MCNC: 11.8 G/DL (ref 11.5–15.4)
IMM GRANULOCYTES # BLD AUTO: 0.02 THOUSAND/UL (ref 0–0.2)
IMM GRANULOCYTES NFR BLD AUTO: 0 % (ref 0–2)
LYMPHOCYTES # BLD AUTO: 1.24 THOUSANDS/ΜL (ref 0.6–4.47)
LYMPHOCYTES NFR BLD AUTO: 26 % (ref 14–44)
MCH RBC QN AUTO: 27.1 PG (ref 26.8–34.3)
MCHC RBC AUTO-ENTMCNC: 31.1 G/DL (ref 31.4–37.4)
MCV RBC AUTO: 87 FL (ref 82–98)
MONOCYTES # BLD AUTO: 0.35 THOUSAND/ΜL (ref 0.17–1.22)
MONOCYTES NFR BLD AUTO: 7 % (ref 4–12)
NEUTROPHILS # BLD AUTO: 3.04 THOUSANDS/ΜL (ref 1.85–7.62)
NEUTS SEG NFR BLD AUTO: 64 % (ref 43–75)
NRBC BLD AUTO-RTO: 0 /100 WBCS
PLATELET # BLD AUTO: 130 THOUSANDS/UL (ref 149–390)
PMV BLD AUTO: 10.5 FL (ref 8.9–12.7)
POTASSIUM SERPL-SCNC: 5.2 MMOL/L (ref 3.5–5.3)
PROT SERPL-MCNC: 7.8 G/DL (ref 6.4–8.4)
RBC # BLD AUTO: 4.36 MILLION/UL (ref 3.81–5.12)
SODIUM SERPL-SCNC: 136 MMOL/L (ref 135–147)
WBC # BLD AUTO: 4.77 THOUSAND/UL (ref 4.31–10.16)

## 2022-07-20 PROCEDURE — 85025 COMPLETE CBC W/AUTO DIFF WBC: CPT | Performed by: EMERGENCY MEDICINE

## 2022-07-20 PROCEDURE — 80053 COMPREHEN METABOLIC PANEL: CPT | Performed by: EMERGENCY MEDICINE

## 2022-07-20 PROCEDURE — 93971 EXTREMITY STUDY: CPT

## 2022-07-20 PROCEDURE — 99284 EMERGENCY DEPT VISIT MOD MDM: CPT

## 2022-07-20 PROCEDURE — 73502 X-RAY EXAM HIP UNI 2-3 VIEWS: CPT

## 2022-07-20 PROCEDURE — 36415 COLL VENOUS BLD VENIPUNCTURE: CPT

## 2022-07-20 RX ORDER — ACETAMINOPHEN 325 MG/1
650 TABLET ORAL ONCE
Status: COMPLETED | OUTPATIENT
Start: 2022-07-20 | End: 2022-07-20

## 2022-07-20 RX ADMIN — ACETAMINOPHEN 650 MG: 325 TABLET ORAL at 18:00
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
## 2022-07-21 ENCOUNTER — OFFICE VISIT (OUTPATIENT)
Dept: CARDIOLOGY CLINIC | Facility: CLINIC | Age: 57
End: 2022-07-21
Payer: COMMERCIAL

## 2022-07-21 VITALS
WEIGHT: 214 LBS | SYSTOLIC BLOOD PRESSURE: 116 MMHG | HEART RATE: 60 BPM | HEIGHT: 67 IN | BODY MASS INDEX: 33.59 KG/M2 | DIASTOLIC BLOOD PRESSURE: 64 MMHG

## 2022-07-21 DIAGNOSIS — I48.92 ATRIAL FLUTTER, UNSPECIFIED TYPE (HCC): Primary | ICD-10-CM

## 2022-07-21 DIAGNOSIS — N18.32 STAGE 3B CHRONIC KIDNEY DISEASE (HCC): ICD-10-CM

## 2022-07-21 DIAGNOSIS — E87.8 ELECTROLYTE ABNORMALITY: ICD-10-CM

## 2022-07-21 PROCEDURE — 93971 EXTREMITY STUDY: CPT | Performed by: SURGERY

## 2022-07-21 PROCEDURE — 99214 OFFICE O/P EST MOD 30 MIN: CPT | Performed by: PHYSICIAN ASSISTANT

## 2022-07-21 PROCEDURE — 93000 ELECTROCARDIOGRAM COMPLETE: CPT | Performed by: PHYSICIAN ASSISTANT
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
## 2022-07-22 ENCOUNTER — HOSPITAL ENCOUNTER (EMERGENCY)
Facility: HOSPITAL | Age: 57
Discharge: HOME | End: 2022-07-22
Attending: INTERNAL MEDICINE
Payer: COMMERCIAL

## 2022-07-22 VITALS
SYSTOLIC BLOOD PRESSURE: 161 MMHG | HEIGHT: 68 IN | RESPIRATION RATE: 16 BRPM | WEIGHT: 214 LBS | OXYGEN SATURATION: 100 % | BODY MASS INDEX: 32.43 KG/M2 | TEMPERATURE: 98 F | DIASTOLIC BLOOD PRESSURE: 105 MMHG | HEART RATE: 60 BPM

## 2022-07-22 DIAGNOSIS — T78.40XA ALLERGY, INITIAL ENCOUNTER: Primary | ICD-10-CM

## 2022-07-22 PROCEDURE — 99283 EMERGENCY DEPT VISIT LOW MDM: CPT

## 2022-07-22 PROCEDURE — 99282 EMERGENCY DEPT VISIT SF MDM: CPT | Performed by: PHYSICIAN ASSISTANT

## 2022-07-22 RX ORDER — DIPHENHYDRAMINE HCL 25 MG
25 TABLET ORAL ONCE
Status: COMPLETED | OUTPATIENT
Start: 2022-07-22 | End: 2022-07-22

## 2022-07-22 RX ADMIN — DIPHENHYDRAMINE HYDROCHLORIDE 25 MG: 25 TABLET ORAL at 11:01

## 2022-07-22 NOTE — DISCHARGE INSTRUCTIONS
Use Tylenol every 4 hours or Motrin every 6 hours; you can alternate the 2 medications taking something every 3 hours for pain  Ice to area to help with pain, swelling  Benadryl 50mg every 6 hours for pain, itching      Follow-up with PCP if no improvement in symptoms
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
## 2022-07-26 DIAGNOSIS — I48.4 ATYPICAL ATRIAL FLUTTER (HCC): Primary | ICD-10-CM

## 2022-07-26 RX ORDER — AMIODARONE HYDROCHLORIDE 200 MG/1
100 TABLET ORAL DAILY
Qty: 45 TABLET | Refills: 0 | Status: SHIPPED | OUTPATIENT
Start: 2022-07-26 | End: 2022-10-14

## 2022-07-28 ENCOUNTER — HOSPITAL ENCOUNTER (OUTPATIENT)
Dept: NON INVASIVE DIAGNOSTICS | Facility: HOSPITAL | Age: 57
Discharge: HOME | End: 2022-07-28
Payer: COMMERCIAL

## 2022-07-28 VITALS
HEIGHT: 67 IN | BODY MASS INDEX: 33.59 KG/M2 | SYSTOLIC BLOOD PRESSURE: 161 MMHG | DIASTOLIC BLOOD PRESSURE: 105 MMHG | HEART RATE: 60 BPM | WEIGHT: 214 LBS

## 2022-07-28 DIAGNOSIS — I48.92 ATRIAL FLUTTER, UNSPECIFIED TYPE (HCC): ICD-10-CM

## 2022-07-28 LAB
AV PEAK GRADIENT: 14 MMHG
SL CV LV EF: 55
TR MAX PG: 26 MMHG
TR PEAK VELOCITY: 2.6 M/S
TRICUSPID VALVE PEAK REGURGITATION VELOCITY: 2.56 M/S

## 2022-07-28 PROCEDURE — 93308 TTE F-UP OR LMTD: CPT

## 2022-07-28 PROCEDURE — 93325 DOPPLER ECHO COLOR FLOW MAPG: CPT

## 2022-07-28 PROCEDURE — 93321 DOPPLER ECHO F-UP/LMTD STD: CPT | Performed by: INTERNAL MEDICINE

## 2022-07-28 PROCEDURE — 93321 DOPPLER ECHO F-UP/LMTD STD: CPT

## 2022-07-28 PROCEDURE — 93325 DOPPLER ECHO COLOR FLOW MAPG: CPT | Performed by: INTERNAL MEDICINE

## 2022-07-28 PROCEDURE — 93308 TTE F-UP OR LMTD: CPT | Performed by: INTERNAL MEDICINE

## 2022-08-01 ENCOUNTER — TELEPHONE (OUTPATIENT)
Dept: CARDIOLOGY CLINIC | Facility: CLINIC | Age: 57
End: 2022-08-01

## 2022-08-01 NOTE — TELEPHONE ENCOUNTER
Called patient, discussed normal EF findings and to have her f/u with her PCP on TSH value  Will send over clearance for surgery to Dr Campbell Gaitan office

## 2022-08-12 ENCOUNTER — OFFICE VISIT (OUTPATIENT)
Dept: NEPHROLOGY | Facility: CLINIC | Age: 57
End: 2022-08-12
Payer: COMMERCIAL

## 2022-08-12 VITALS
HEART RATE: 68 BPM | BODY MASS INDEX: 34.69 KG/M2 | HEIGHT: 67 IN | DIASTOLIC BLOOD PRESSURE: 74 MMHG | SYSTOLIC BLOOD PRESSURE: 136 MMHG | WEIGHT: 221 LBS

## 2022-08-12 DIAGNOSIS — I13.0 HYPERTENSIVE HEART AND RENAL DISEASE WITH CONGESTIVE HEART FAILURE (HCC): Primary | ICD-10-CM

## 2022-08-12 DIAGNOSIS — N28.1 RENAL CYST: ICD-10-CM

## 2022-08-12 DIAGNOSIS — N18.30 BENIGN HYPERTENSION WITH CKD (CHRONIC KIDNEY DISEASE) STAGE III (HCC): ICD-10-CM

## 2022-08-12 DIAGNOSIS — I12.9 BENIGN HYPERTENSION WITH CKD (CHRONIC KIDNEY DISEASE) STAGE III (HCC): ICD-10-CM

## 2022-08-12 DIAGNOSIS — N20.0 NEPHROLITHIASIS: ICD-10-CM

## 2022-08-12 DIAGNOSIS — N18.32 STAGE 3B CHRONIC KIDNEY DISEASE (HCC): ICD-10-CM

## 2022-08-12 PROBLEM — I16.0 HYPERTENSIVE URGENCY: Status: RESOLVED | Noted: 2022-01-30 | Resolved: 2022-08-12

## 2022-08-12 PROCEDURE — 99214 OFFICE O/P EST MOD 30 MIN: CPT | Performed by: INTERNAL MEDICINE
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
## 2022-08-13 ENCOUNTER — HOSPITAL ENCOUNTER (EMERGENCY)
Facility: HOSPITAL | Age: 57
Discharge: HOME | End: 2022-08-13
Attending: EMERGENCY MEDICINE
Payer: COMMERCIAL

## 2022-08-13 ENCOUNTER — APPOINTMENT (EMERGENCY)
Dept: RADIOLOGY | Facility: HOSPITAL | Age: 57
End: 2022-08-13
Payer: COMMERCIAL

## 2022-08-13 VITALS
OXYGEN SATURATION: 97 % | HEART RATE: 60 BPM | TEMPERATURE: 98 F | DIASTOLIC BLOOD PRESSURE: 94 MMHG | SYSTOLIC BLOOD PRESSURE: 194 MMHG | RESPIRATION RATE: 18 BRPM

## 2022-08-13 DIAGNOSIS — S60.211A CONTUSION OF RIGHT WRIST: Primary | ICD-10-CM

## 2022-08-13 PROCEDURE — 73080 X-RAY EXAM OF ELBOW: CPT

## 2022-08-13 PROCEDURE — 73130 X-RAY EXAM OF HAND: CPT

## 2022-08-13 PROCEDURE — 73110 X-RAY EXAM OF WRIST: CPT

## 2022-08-13 PROCEDURE — 99282 EMERGENCY DEPT VISIT SF MDM: CPT | Performed by: EMERGENCY MEDICINE

## 2022-08-13 PROCEDURE — 99283 EMERGENCY DEPT VISIT LOW MDM: CPT

## 2022-08-13 RX ORDER — ACETAMINOPHEN 325 MG/1
975 TABLET ORAL ONCE
Status: DISCONTINUED | OUTPATIENT
Start: 2022-08-13 | End: 2022-08-13 | Stop reason: HOSPADM
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
## 2022-08-23 ENCOUNTER — APPOINTMENT (OUTPATIENT)
Dept: LAB | Facility: CLINIC | Age: 57
End: 2022-08-23
Payer: COMMERCIAL

## 2022-08-23 ENCOUNTER — OFFICE VISIT (OUTPATIENT)
Dept: GASTROENTEROLOGY | Facility: CLINIC | Age: 57
End: 2022-08-23
Payer: COMMERCIAL

## 2022-08-23 ENCOUNTER — HOSPITAL ENCOUNTER (OUTPATIENT)
Dept: CT IMAGING | Facility: HOSPITAL | Age: 57
Discharge: HOME | End: 2022-08-23
Payer: COMMERCIAL

## 2022-08-23 VITALS
SYSTOLIC BLOOD PRESSURE: 165 MMHG | HEART RATE: 60 BPM | DIASTOLIC BLOOD PRESSURE: 93 MMHG | HEIGHT: 67 IN | BODY MASS INDEX: 34.36 KG/M2 | WEIGHT: 218.9 LBS

## 2022-08-23 DIAGNOSIS — R19.7 DIARRHEA, UNSPECIFIED TYPE: ICD-10-CM

## 2022-08-23 DIAGNOSIS — R11.2 NAUSEA AND VOMITING, UNSPECIFIED VOMITING TYPE: ICD-10-CM

## 2022-08-23 DIAGNOSIS — N18.30 BENIGN HYPERTENSION WITH CKD (CHRONIC KIDNEY DISEASE) STAGE III (HCC): ICD-10-CM

## 2022-08-23 DIAGNOSIS — R10.32 LLQ PAIN: ICD-10-CM

## 2022-08-23 DIAGNOSIS — Z86.19 HISTORY OF HEPATITIS C: ICD-10-CM

## 2022-08-23 DIAGNOSIS — R10.31 BILATERAL LOWER ABDOMINAL CRAMPING: ICD-10-CM

## 2022-08-23 DIAGNOSIS — Z01.818 ENCOUNTER FOR PREADMISSION TESTING: ICD-10-CM

## 2022-08-23 DIAGNOSIS — R10.32 BILATERAL LOWER ABDOMINAL CRAMPING: ICD-10-CM

## 2022-08-23 DIAGNOSIS — N17.9 AKI (ACUTE KIDNEY INJURY) (HCC): ICD-10-CM

## 2022-08-23 DIAGNOSIS — R10.32 LLQ PAIN: Primary | ICD-10-CM

## 2022-08-23 DIAGNOSIS — I12.9 BENIGN HYPERTENSION WITH CKD (CHRONIC KIDNEY DISEASE) STAGE III (HCC): ICD-10-CM

## 2022-08-23 DIAGNOSIS — N18.32 STAGE 3B CHRONIC KIDNEY DISEASE (HCC): ICD-10-CM

## 2022-08-23 LAB
ALBUMIN SERPL BCP-MCNC: 4.3 G/DL (ref 3.5–5)
ALP SERPL-CCNC: 54 U/L (ref 34–104)
ALT SERPL W P-5'-P-CCNC: 16 U/L (ref 7–52)
ANION GAP SERPL CALCULATED.3IONS-SCNC: 4 MMOL/L (ref 4–13)
AST SERPL W P-5'-P-CCNC: 19 U/L (ref 13–39)
BACTERIA UR QL AUTO: ABNORMAL /HPF
BASOPHILS # BLD AUTO: 0.03 THOUSANDS/ΜL (ref 0–0.1)
BASOPHILS NFR BLD AUTO: 1 % (ref 0–1)
BILIRUB SERPL-MCNC: 0.44 MG/DL (ref 0.2–1)
BILIRUB UR QL STRIP: NEGATIVE
BUN SERPL-MCNC: 24 MG/DL (ref 5–25)
CALCIUM SERPL-MCNC: 9.3 MG/DL (ref 8.4–10.2)
CHLORIDE SERPL-SCNC: 105 MMOL/L (ref 96–108)
CLARITY UR: CLEAR
CO2 SERPL-SCNC: 30 MMOL/L (ref 21–32)
COLOR UR: YELLOW
CREAT SERPL-MCNC: 2.22 MG/DL (ref 0.6–1.3)
CREAT UR-MCNC: 197.5 MG/DL
EOSINOPHIL # BLD AUTO: 0.07 THOUSAND/ΜL (ref 0–0.61)
EOSINOPHIL NFR BLD AUTO: 1 % (ref 0–6)
ERYTHROCYTE [DISTWIDTH] IN BLOOD BY AUTOMATED COUNT: 15.4 % (ref 11.6–15.1)
GFR SERPL CREATININE-BSD FRML MDRD: 23 ML/MIN/1.73SQ M
GLUCOSE P FAST SERPL-MCNC: 63 MG/DL (ref 65–99)
GLUCOSE UR STRIP-MCNC: NEGATIVE MG/DL
HCT VFR BLD AUTO: 35.5 % (ref 34.8–46.1)
HGB BLD-MCNC: 11.5 G/DL (ref 11.5–15.4)
HGB UR QL STRIP.AUTO: NEGATIVE
HYALINE CASTS #/AREA URNS LPF: ABNORMAL /LPF
IMM GRANULOCYTES # BLD AUTO: 0.02 THOUSAND/UL (ref 0–0.2)
IMM GRANULOCYTES NFR BLD AUTO: 0 % (ref 0–2)
KETONES UR STRIP-MCNC: NEGATIVE MG/DL
LEUKOCYTE ESTERASE UR QL STRIP: ABNORMAL
LYMPHOCYTES # BLD AUTO: 1.53 THOUSANDS/ΜL (ref 0.6–4.47)
LYMPHOCYTES NFR BLD AUTO: 26 % (ref 14–44)
MAGNESIUM SERPL-MCNC: 2.1 MG/DL (ref 1.9–2.7)
MCH RBC QN AUTO: 27.8 PG (ref 26.8–34.3)
MCHC RBC AUTO-ENTMCNC: 32.4 G/DL (ref 31.4–37.4)
MCV RBC AUTO: 86 FL (ref 82–98)
MONOCYTES # BLD AUTO: 0.44 THOUSAND/ΜL (ref 0.17–1.22)
MONOCYTES NFR BLD AUTO: 8 % (ref 4–12)
NEUTROPHILS # BLD AUTO: 3.75 THOUSANDS/ΜL (ref 1.85–7.62)
NEUTS SEG NFR BLD AUTO: 64 % (ref 43–75)
NITRITE UR QL STRIP: NEGATIVE
NON-SQ EPI CELLS URNS QL MICRO: ABNORMAL /HPF
NRBC BLD AUTO-RTO: 0 /100 WBCS
PH UR STRIP.AUTO: 6 [PH]
PHOSPHATE SERPL-MCNC: 4 MG/DL (ref 2.7–4.5)
PLATELET # BLD AUTO: 144 THOUSANDS/UL (ref 149–390)
PMV BLD AUTO: 10.8 FL (ref 8.9–12.7)
POTASSIUM SERPL-SCNC: 3.8 MMOL/L (ref 3.5–5.3)
PROT SERPL-MCNC: 7.8 G/DL (ref 6.4–8.4)
PROT UR STRIP-MCNC: ABNORMAL MG/DL
RBC # BLD AUTO: 4.14 MILLION/UL (ref 3.81–5.12)
RBC #/AREA URNS AUTO: ABNORMAL /HPF
SODIUM 24H UR-SCNC: 68 MOL/L
SODIUM SERPL-SCNC: 139 MMOL/L (ref 135–147)
SP GR UR STRIP.AUTO: 1.02 (ref 1–1.03)
TSH SERPL DL<=0.05 MIU/L-ACNC: 8.69 UIU/ML (ref 0.45–4.5)
UROBILINOGEN UR STRIP-ACNC: 2 MG/DL
WBC # BLD AUTO: 5.84 THOUSAND/UL (ref 4.31–10.16)
WBC #/AREA URNS AUTO: ABNORMAL /HPF

## 2022-08-23 PROCEDURE — 85025 COMPLETE CBC W/AUTO DIFF WBC: CPT

## 2022-08-23 PROCEDURE — 99204 OFFICE O/P NEW MOD 45 MIN: CPT | Performed by: NURSE PRACTITIONER

## 2022-08-23 PROCEDURE — 84443 ASSAY THYROID STIM HORMONE: CPT

## 2022-08-23 PROCEDURE — 74176 CT ABD & PELVIS W/O CONTRAST: CPT

## 2022-08-23 PROCEDURE — 83970 ASSAY OF PARATHORMONE: CPT

## 2022-08-23 PROCEDURE — G1004 CDSM NDSC: HCPCS

## 2022-08-23 PROCEDURE — 84100 ASSAY OF PHOSPHORUS: CPT

## 2022-08-23 PROCEDURE — 84439 ASSAY OF FREE THYROXINE: CPT

## 2022-08-23 PROCEDURE — 99214 OFFICE O/P EST MOD 30 MIN: CPT | Performed by: NURSE PRACTITIONER

## 2022-08-23 PROCEDURE — 87522 HEPATITIS C REVRS TRNSCRPJ: CPT

## 2022-08-23 PROCEDURE — 36415 COLL VENOUS BLD VENIPUNCTURE: CPT

## 2022-08-23 PROCEDURE — 80053 COMPREHEN METABOLIC PANEL: CPT

## 2022-08-23 PROCEDURE — 83735 ASSAY OF MAGNESIUM: CPT

## 2022-08-23 RX ORDER — OXYCODONE HYDROCHLORIDE AND ACETAMINOPHEN 5; 325 MG/1; MG/1
1 TABLET ORAL EVERY 6 HOURS PRN
COMMUNITY
Start: 2022-08-23 | End: 2022-09-02

## 2022-08-23 RX ORDER — CEPHALEXIN 500 MG/1
CAPSULE ORAL
Status: ON HOLD | COMMUNITY
Start: 2022-08-23 | End: 2022-10-14

## 2022-08-23 RX ORDER — AMOXICILLIN AND CLAVULANATE POTASSIUM 875; 125 MG/1; MG/1
1 TABLET, FILM COATED ORAL 2 TIMES DAILY
COMMUNITY
Start: 2022-08-22 | End: 2022-09-01

## 2022-08-23 RX ORDER — ONDANSETRON 4 MG/1
4 TABLET, ORALLY DISINTEGRATING ORAL EVERY 6 HOURS PRN
Qty: 20 TABLET | Refills: 0 | Status: SHIPPED | OUTPATIENT
Start: 2022-08-23

## 2022-08-23 RX ORDER — PANTOPRAZOLE SODIUM 40 MG/1
40 TABLET, DELAYED RELEASE ORAL DAILY
Qty: 30 TABLET | Refills: 2 | Status: SHIPPED | OUTPATIENT
Start: 2022-08-23
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
## 2022-08-23 NOTE — PATIENT INSTRUCTIONS
-Get bloodwork, CT scan, stool studies done  -Start Protonix 40mg daily 1/2 hour before breakfast  You can take Zofran as needed for nausea/vomiting  -Try to improve nutrition with nutritional shakes, stick with liquid/low fiber start  -Start over the counter probiotic  -Follow up in 4 weeks  -You will need EGD/Colonoscopy    Diverticulitis Diet   WHAT YOU NEED TO KNOW:   What is a diverticulitis diet? A diverticulitis diet includes foods that allow your intestines to rest while you have diverticulitis  Diverticulitis is a condition that causes diverticula (small pockets) along your intestine to become inflamed or infected  This is caused by hard bowel movement, food, or bacteria that get stuck in the pockets  Which foods may be recommended while I have diverticulitis? A clear liquid diet may be recommended for 2 to 3 days  A clear liquid diet includes clear liquids, and foods that are liquid at room temperature  Examples include the following:     Water and clear juices (such as apple, cranberry, or grape), strained citrus juices or fruit punch    Coffee or tea (without cream or milk)    Clear sports drinks or soft drinks, such as ginger ale, lemon-lime soda, or club soda (no cola or root beer)    Clear broth, bouillon, or consommé    Plain popsicles (no popsicles with pureed fruit or fiber)    Flavored gelatin without fruit    Low-fiber foods may be recommended until your symptoms improve    Examples include the following:     Cream of wheat and finely ground grits    White bread, white pasta, and white rice    Canned and well-cooked fruit without skins or seeds, and juice without pulp    Canned and well-cooked vegetables without skins or seeds, and vegetable juice    Cow's milk, lactose-free milk, soy milk, and rice milk    Yogurt, cottage cheese, and sherbet    Eggs, poultry (such as chicken and turkey), fish, and tender, ground, well-cooked beef     Tofu and smooth nut butters, such as peanut butter    Broth and strained soups made of low-fiber foods    What do I need to know about high-fiber foods? High-fiber foods can help prevent diverticulosis and diverticulitis  Your healthcare provider will tell you when you can add high-fiber foods back into your diet  Examples include the following:  Whole grains and breads, and cereals made with whole grains    Dried fruit, fresh fruit with skin, and fruit pulp    Raw vegetables    Cooked greens, such as spinach    Tough meat and meat with gristle    Legumes, such as [place] beans and lentils       When should I call my doctor? Your symptoms get worse or do not go away  You have questions about the foods you should eat  You have questions or concerns about your condition or care  CARE AGREEMENT:   You have the right to help plan your care  Learn about your health condition and how it may be treated  Discuss treatment options with your healthcare providers to decide what care you want to receive  You always have the right to refuse treatment  The above information is an  only  It is not intended as medical advice for individual conditions or treatments  Talk to your doctor, nurse or pharmacist before following any medical regimen to see if it is safe and effective for you  © Copyright [employer] 2022 Information is for End User's use only and may not be sold, redistributed or otherwise used for commercial purposes   All illustrations and images included in CareNotes® are the copyrighted property of [employer]  or [address] [place] [address]

## 2022-08-24 ENCOUNTER — TELEPHONE (OUTPATIENT)
Dept: NEPHROLOGY | Facility: CLINIC | Age: 57
End: 2022-08-24

## 2022-08-24 DIAGNOSIS — N18.30 STAGE 3 CHRONIC KIDNEY DISEASE, UNSPECIFIED WHETHER STAGE 3A OR 3B CKD (HCC): Primary | ICD-10-CM

## 2022-08-24 LAB
CREAT UR-MCNC: 250 MG/DL
MICROALBUMIN UR-MCNC: 290 MG/L (ref 0–20)
MICROALBUMIN/CREAT 24H UR: 116 MG/G CREATININE (ref 0–30)
PTH-INTACT SERPL-MCNC: 65.3 PG/ML (ref 18.4–80.1)
T4 FREE SERPL-MCNC: 0.97 NG/DL (ref 0.76–1.46)
UUN 24H UR-MCNC: 1023 MG/DL

## 2022-08-24 NOTE — TELEPHONE ENCOUNTER
Creatinine 2 2 slightly higher but still closer to baseline  UA shows several WBCs  Does patient have fever, chills, lower abdominal pain, urinary symptoms like dysuria, urinary frequency/urgency, foul smell in urine, cloudy urine, etc ? If has no symptoms, will monitor without any need for antibiotics  She should do repeat BMP in one month  She should hold Lasix 20 mg daily for next three days and then continue same 20 mg daily afterwards      Of note FeUrea 48%

## 2022-08-25 LAB
HCV RNA SERPL NAA+PROBE-ACNC: NORMAL IU/ML
HCV RNA SERPL NAA+PROBE-LOG IU: 6.03 LOG10 IU/ML
TEST INFORMATION: NORMAL

## 2022-08-25 NOTE — TELEPHONE ENCOUNTER
Patient was advised  Patient stated she is having pain when urinating but she is already on antibiotics from gastro and her pcp

## 2022-08-30 LAB
C-ANCA TITR SER IF: NORMAL TITER
MYELOPEROXIDASE AB SER IA-ACNC: <0.2 UNITS (ref 0–0.9)
P-ANCA ATYPICAL TITR SER IF: NORMAL TITER
P-ANCA TITR SER IF: NORMAL TITER
PROTEINASE3 AB SER IA-ACNC: <0.2 UNITS (ref 0–0.9)

## 2022-09-09 ENCOUNTER — REMOTE DEVICE CLINIC VISIT (OUTPATIENT)
Dept: CARDIOLOGY CLINIC | Facility: CLINIC | Age: 57
End: 2022-09-09
Payer: COMMERCIAL

## 2022-09-09 DIAGNOSIS — Z95.810 PRESENCE OF AUTOMATIC CARDIOVERTER/DEFIBRILLATOR (AICD): Primary | ICD-10-CM

## 2022-09-09 PROCEDURE — 93296 REM INTERROG EVL PM/IDS: CPT | Performed by: INTERNAL MEDICINE

## 2022-09-09 PROCEDURE — 93295 DEV INTERROG REMOTE 1/2/MLT: CPT | Performed by: INTERNAL MEDICINE

## 2022-09-09 NOTE — PROGRESS NOTES
Results for orders placed or performed in visit on 09/09/22   Cardiac EP device report    Narrative    MDT-DUAL CHAMBER ICD (AAI-DDD MODE) - ACTIVE SYSTEM IS MRI CONDITIONAL  CARELINK TRANSMISSION: BATTERY VOLTAGE ADEQUATE (2 3 YRS)  AP: 99 7%  : <0 1% (MVP-ON)  ALL AVAILABLE LEAD PARAMETERS WITHIN NORMAL LIMITS  7 AT/AF EPISODES W/ AVAIL EGMS SHOWING AF, MAX DURATION 20 MINS 48 SECS  AF BURDEN: <0 1%  1 SYMPTOM NOTED ON 7/27 N AVAIL EGM  PT TAKES AMIODARONE, XARELTO, METOPROLOL SUCC  EF: 55 %(ECHO 7/28/22)  OPTI-VOL WITHIN NORMAL LIMITS  APPROPRIATELY FUNCTIONING ICD    Psychiatric

## 2022-09-20 ENCOUNTER — TELEPHONE (OUTPATIENT)
Dept: CARDIOLOGY CLINIC | Facility: CLINIC | Age: 57
End: 2022-09-20

## 2022-09-20 NOTE — TELEPHONE ENCOUNTER
Pt's spouse called to ask if we can have one of the providers sign off on the Medical Certification Forms for [employer]  ([employer])  Both forms filled out and faxed to the suppliers, an dsent to [employer] for scanning

## 2022-10-09 ENCOUNTER — APPOINTMENT (EMERGENCY)
Dept: CT IMAGING | Facility: HOSPITAL | Age: 57
End: 2022-10-09
Payer: COMMERCIAL

## 2022-10-09 ENCOUNTER — HOSPITAL ENCOUNTER (EMERGENCY)
Facility: HOSPITAL | Age: 57
Discharge: HOME | End: 2022-10-09
Attending: EMERGENCY MEDICINE | Admitting: EMERGENCY MEDICINE
Payer: COMMERCIAL

## 2022-10-09 VITALS
WEIGHT: 222 LBS | TEMPERATURE: 97.8 F | RESPIRATION RATE: 20 BRPM | HEART RATE: 60 BPM | OXYGEN SATURATION: 98 % | SYSTOLIC BLOOD PRESSURE: 194 MMHG | DIASTOLIC BLOOD PRESSURE: 91 MMHG | BODY MASS INDEX: 34.77 KG/M2

## 2022-10-09 DIAGNOSIS — D64.9 ANEMIA: ICD-10-CM

## 2022-10-09 DIAGNOSIS — R10.32 LEFT LOWER QUADRANT ABDOMINAL PAIN: Primary | ICD-10-CM

## 2022-10-09 DIAGNOSIS — R19.7 DIARRHEA: ICD-10-CM

## 2022-10-09 LAB
ALBUMIN SERPL BCP-MCNC: 4 G/DL (ref 3.5–5)
ALP SERPL-CCNC: 66 U/L (ref 34–104)
ALT SERPL W P-5'-P-CCNC: 14 U/L (ref 7–52)
ANION GAP SERPL CALCULATED.3IONS-SCNC: 2 MMOL/L (ref 4–13)
AST SERPL W P-5'-P-CCNC: 15 U/L (ref 13–39)
BASOPHILS # BLD AUTO: 0.04 THOUSANDS/ΜL (ref 0–0.1)
BASOPHILS NFR BLD AUTO: 1 % (ref 0–1)
BILIRUB SERPL-MCNC: 0.44 MG/DL (ref 0.2–1)
BILIRUB UR QL STRIP: NEGATIVE
BUN SERPL-MCNC: 29 MG/DL (ref 5–25)
CALCIUM SERPL-MCNC: 8.7 MG/DL (ref 8.4–10.2)
CHLORIDE SERPL-SCNC: 107 MMOL/L (ref 96–108)
CLARITY UR: CLEAR
CO2 SERPL-SCNC: 29 MMOL/L (ref 21–32)
COLOR UR: NORMAL
CREAT SERPL-MCNC: 1.77 MG/DL (ref 0.6–1.3)
EOSINOPHIL # BLD AUTO: 0.09 THOUSAND/ΜL (ref 0–0.61)
EOSINOPHIL NFR BLD AUTO: 1 % (ref 0–6)
ERYTHROCYTE [DISTWIDTH] IN BLOOD BY AUTOMATED COUNT: 15 % (ref 11.6–15.1)
GFR SERPL CREATININE-BSD FRML MDRD: 31 ML/MIN/1.73SQ M
GLUCOSE SERPL-MCNC: 93 MG/DL (ref 65–140)
GLUCOSE UR STRIP-MCNC: NEGATIVE MG/DL
HCT VFR BLD AUTO: 32.3 % (ref 34.8–46.1)
HGB BLD-MCNC: 9.9 G/DL (ref 11.5–15.4)
HGB UR QL STRIP.AUTO: NEGATIVE
IMM GRANULOCYTES # BLD AUTO: 0.03 THOUSAND/UL (ref 0–0.2)
IMM GRANULOCYTES NFR BLD AUTO: 1 % (ref 0–2)
KETONES UR STRIP-MCNC: NEGATIVE MG/DL
LACTATE SERPL-SCNC: 0.7 MMOL/L (ref 0.5–2)
LEUKOCYTE ESTERASE UR QL STRIP: NEGATIVE
LIPASE SERPL-CCNC: 83 U/L (ref 11–82)
LYMPHOCYTES # BLD AUTO: 1.4 THOUSANDS/ΜL (ref 0.6–4.47)
LYMPHOCYTES NFR BLD AUTO: 22 % (ref 14–44)
MCH RBC QN AUTO: 27.6 PG (ref 26.8–34.3)
MCHC RBC AUTO-ENTMCNC: 30.7 G/DL (ref 31.4–37.4)
MCV RBC AUTO: 90 FL (ref 82–98)
MONOCYTES # BLD AUTO: 0.51 THOUSAND/ΜL (ref 0.17–1.22)
MONOCYTES NFR BLD AUTO: 8 % (ref 4–12)
NEUTROPHILS # BLD AUTO: 4.18 THOUSANDS/ΜL (ref 1.85–7.62)
NEUTS SEG NFR BLD AUTO: 67 % (ref 43–75)
NITRITE UR QL STRIP: NEGATIVE
NRBC BLD AUTO-RTO: 0 /100 WBCS
PH UR STRIP.AUTO: 5.5 [PH]
PLATELET # BLD AUTO: 170 THOUSANDS/UL (ref 149–390)
PMV BLD AUTO: 11.2 FL (ref 8.9–12.7)
POTASSIUM SERPL-SCNC: 4.8 MMOL/L (ref 3.5–5.3)
PROT SERPL-MCNC: 7.2 G/DL (ref 6.4–8.4)
PROT UR STRIP-MCNC: NEGATIVE MG/DL
RBC # BLD AUTO: 3.59 MILLION/UL (ref 3.81–5.12)
SODIUM SERPL-SCNC: 138 MMOL/L (ref 135–147)
SP GR UR STRIP.AUTO: 1.02 (ref 1–1.03)
UROBILINOGEN UR STRIP-ACNC: <2 MG/DL
WBC # BLD AUTO: 6.25 THOUSAND/UL (ref 4.31–10.16)

## 2022-10-09 PROCEDURE — 83690 ASSAY OF LIPASE: CPT | Performed by: PHYSICIAN ASSISTANT

## 2022-10-09 PROCEDURE — 81003 URINALYSIS AUTO W/O SCOPE: CPT | Performed by: PHYSICIAN ASSISTANT

## 2022-10-09 PROCEDURE — 36415 COLL VENOUS BLD VENIPUNCTURE: CPT | Performed by: PHYSICIAN ASSISTANT

## 2022-10-09 PROCEDURE — 96374 THER/PROPH/DIAG INJ IV PUSH: CPT

## 2022-10-09 PROCEDURE — 74176 CT ABD & PELVIS W/O CONTRAST: CPT

## 2022-10-09 PROCEDURE — 99284 EMERGENCY DEPT VISIT MOD MDM: CPT | Performed by: PHYSICIAN ASSISTANT

## 2022-10-09 PROCEDURE — 80053 COMPREHEN METABOLIC PANEL: CPT | Performed by: PHYSICIAN ASSISTANT

## 2022-10-09 PROCEDURE — 83605 ASSAY OF LACTIC ACID: CPT | Performed by: PHYSICIAN ASSISTANT

## 2022-10-09 PROCEDURE — 85025 COMPLETE CBC W/AUTO DIFF WBC: CPT | Performed by: PHYSICIAN ASSISTANT

## 2022-10-09 PROCEDURE — 99284 EMERGENCY DEPT VISIT MOD MDM: CPT

## 2022-10-09 RX ORDER — HYDROMORPHONE HCL IN WATER/PF 6 MG/30 ML
0.2 PATIENT CONTROLLED ANALGESIA SYRINGE INTRAVENOUS ONCE
Status: COMPLETED | OUTPATIENT
Start: 2022-10-09 | End: 2022-10-09

## 2022-10-09 RX ADMIN — HYDROMORPHONE HYDROCHLORIDE 0.2 MG: 0.2 INJECTION, SOLUTION INTRAMUSCULAR; INTRAVENOUS; SUBCUTANEOUS at 13:51
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
## 2022-10-13 ENCOUNTER — HOSPITAL ENCOUNTER (INPATIENT)
Facility: HOSPITAL | Age: 57
LOS: 1 days | Discharge: HOME | End: 2022-10-14
Attending: EMERGENCY MEDICINE | Admitting: INTERNAL MEDICINE
Payer: COMMERCIAL

## 2022-10-13 DIAGNOSIS — R07.9 CHEST PAIN, UNSPECIFIED: ICD-10-CM

## 2022-10-13 DIAGNOSIS — I48.4 ATYPICAL ATRIAL FLUTTER (HCC): ICD-10-CM

## 2022-10-13 DIAGNOSIS — I48.92 ATRIAL FLUTTER WITH RAPID VENTRICULAR RESPONSE (HCC): Primary | ICD-10-CM

## 2022-10-13 DIAGNOSIS — I48.92 ATRIAL FLUTTER (HCC): ICD-10-CM

## 2022-10-13 PROBLEM — I10 HYPERTENSION: Status: ACTIVE | Noted: 2022-10-13

## 2022-10-13 PROBLEM — F17.200 SMOKING: Status: ACTIVE | Noted: 2018-12-26

## 2022-10-13 PROBLEM — I50.40 COMBINED SYSTOLIC AND DIASTOLIC HEART FAILURE (HCC): Status: ACTIVE | Noted: 2022-10-13

## 2022-10-13 PROBLEM — IMO0001 SMOKING: Status: ACTIVE | Noted: 2018-12-26

## 2022-10-13 LAB
2HR DELTA HS TROPONIN: -5 NG/L
4HR DELTA HS TROPONIN: 1 NG/L
ALBUMIN SERPL BCP-MCNC: 4.3 G/DL (ref 3.5–5)
ALP SERPL-CCNC: 58 U/L (ref 34–104)
ALT SERPL W P-5'-P-CCNC: 15 U/L (ref 7–52)
ANION GAP SERPL CALCULATED.3IONS-SCNC: 10 MMOL/L (ref 4–13)
AST SERPL W P-5'-P-CCNC: 16 U/L (ref 13–39)
BASOPHILS # BLD AUTO: 0.03 THOUSANDS/ÂΜL (ref 0–0.1)
BASOPHILS NFR BLD AUTO: 0 % (ref 0–1)
BILIRUB SERPL-MCNC: 0.35 MG/DL (ref 0.2–1)
BUN SERPL-MCNC: 36 MG/DL (ref 5–25)
CALCIUM SERPL-MCNC: 9.5 MG/DL (ref 8.4–10.2)
CARDIAC TROPONIN I PNL SERPL HS: 51 NG/L
CARDIAC TROPONIN I PNL SERPL HS: 56 NG/L
CARDIAC TROPONIN I PNL SERPL HS: 57 NG/L
CHLORIDE SERPL-SCNC: 103 MMOL/L (ref 96–108)
CO2 SERPL-SCNC: 26 MMOL/L (ref 21–32)
CREAT SERPL-MCNC: 2.26 MG/DL (ref 0.6–1.3)
D DIMER PPP FEU-MCNC: 0.32 UG/ML FEU
EOSINOPHIL # BLD AUTO: 0.02 THOUSAND/ÂΜL (ref 0–0.61)
EOSINOPHIL NFR BLD AUTO: 0 % (ref 0–6)
ERYTHROCYTE [DISTWIDTH] IN BLOOD BY AUTOMATED COUNT: 14.8 % (ref 11.6–15.1)
GFR SERPL CREATININE-BSD FRML MDRD: 23 ML/MIN/1.73SQ M
GLUCOSE SERPL-MCNC: 122 MG/DL (ref 65–140)
HCT VFR BLD AUTO: 37.3 % (ref 34.8–46.1)
HGB BLD-MCNC: 11.7 G/DL (ref 11.5–15.4)
IMM GRANULOCYTES # BLD AUTO: 0.04 THOUSAND/UL (ref 0–0.2)
IMM GRANULOCYTES NFR BLD AUTO: 0 % (ref 0–2)
LYMPHOCYTES # BLD AUTO: 1.77 THOUSANDS/ÂΜL (ref 0.6–4.47)
LYMPHOCYTES NFR BLD AUTO: 19 % (ref 14–44)
MCH RBC QN AUTO: 28.3 PG (ref 26.8–34.3)
MCHC RBC AUTO-ENTMCNC: 31.4 G/DL (ref 31.4–37.4)
MCV RBC AUTO: 90 FL (ref 82–98)
MONOCYTES # BLD AUTO: 0.66 THOUSAND/ÂΜL (ref 0.17–1.22)
MONOCYTES NFR BLD AUTO: 7 % (ref 4–12)
NEUTROPHILS # BLD AUTO: 6.81 THOUSANDS/ÂΜL (ref 1.85–7.62)
NEUTS SEG NFR BLD AUTO: 74 % (ref 43–75)
NRBC BLD AUTO-RTO: 0 /100 WBCS
PLATELET # BLD AUTO: 206 THOUSANDS/UL (ref 149–390)
PMV BLD AUTO: 11 FL (ref 8.9–12.7)
POTASSIUM SERPL-SCNC: 3.6 MMOL/L (ref 3.5–5.3)
PROT SERPL-MCNC: 7.8 G/DL (ref 6.4–8.4)
RBC # BLD AUTO: 4.13 MILLION/UL (ref 3.81–5.12)
SODIUM SERPL-SCNC: 139 MMOL/L (ref 135–147)
WBC # BLD AUTO: 9.33 THOUSAND/UL (ref 4.31–10.16)

## 2022-10-13 PROCEDURE — 93005 ELECTROCARDIOGRAM TRACING: CPT

## 2022-10-13 PROCEDURE — 96374 THER/PROPH/DIAG INJ IV PUSH: CPT

## 2022-10-13 PROCEDURE — 96375 TX/PRO/DX INJ NEW DRUG ADDON: CPT

## 2022-10-13 PROCEDURE — 36415 COLL VENOUS BLD VENIPUNCTURE: CPT

## 2022-10-13 PROCEDURE — 99285 EMERGENCY DEPT VISIT HI MDM: CPT | Performed by: EMERGENCY MEDICINE

## 2022-10-13 PROCEDURE — 96376 TX/PRO/DX INJ SAME DRUG ADON: CPT

## 2022-10-13 PROCEDURE — 85379 FIBRIN DEGRADATION QUANT: CPT | Performed by: EMERGENCY MEDICINE

## 2022-10-13 PROCEDURE — 85025 COMPLETE CBC W/AUTO DIFF WBC: CPT | Performed by: EMERGENCY MEDICINE

## 2022-10-13 PROCEDURE — 99285 EMERGENCY DEPT VISIT HI MDM: CPT

## 2022-10-13 PROCEDURE — 84484 ASSAY OF TROPONIN QUANT: CPT | Performed by: EMERGENCY MEDICINE

## 2022-10-13 PROCEDURE — 80053 COMPREHEN METABOLIC PANEL: CPT | Performed by: EMERGENCY MEDICINE

## 2022-10-13 RX ORDER — ONDANSETRON 2 MG/ML
4 INJECTION INTRAMUSCULAR; INTRAVENOUS EVERY 6 HOURS PRN
Status: DISCONTINUED | OUTPATIENT
Start: 2022-10-13 | End: 2022-10-14 | Stop reason: HOSPADM

## 2022-10-13 RX ORDER — FUROSEMIDE 20 MG/1
20 TABLET ORAL DAILY
Status: DISCONTINUED | OUTPATIENT
Start: 2022-10-14 | End: 2022-10-13

## 2022-10-13 RX ORDER — AMLODIPINE BESYLATE 5 MG/1
5 TABLET ORAL 2 TIMES DAILY
Status: DISCONTINUED | OUTPATIENT
Start: 2022-10-13 | End: 2022-10-13

## 2022-10-13 RX ORDER — ACETAMINOPHEN 325 MG/1
650 TABLET ORAL ONCE
Status: COMPLETED | OUTPATIENT
Start: 2022-10-13 | End: 2022-10-13

## 2022-10-13 RX ORDER — PANTOPRAZOLE SODIUM 40 MG/1
40 TABLET, DELAYED RELEASE ORAL DAILY
Status: DISCONTINUED | OUTPATIENT
Start: 2022-10-14 | End: 2022-10-14 | Stop reason: HOSPADM

## 2022-10-13 RX ORDER — DILTIAZEM HYDROCHLORIDE 5 MG/ML
10 INJECTION INTRAVENOUS ONCE
Status: COMPLETED | OUTPATIENT
Start: 2022-10-13 | End: 2022-10-13

## 2022-10-13 RX ORDER — EPINEPHRINE 1 MG/ML
0.3 INJECTION, SOLUTION, CONCENTRATE INTRAVENOUS ONCE
Status: DISCONTINUED | OUTPATIENT
Start: 2022-10-13 | End: 2022-10-13

## 2022-10-13 RX ORDER — OXYCODONE HYDROCHLORIDE 5 MG/1
5 TABLET ORAL EVERY 6 HOURS PRN
Status: DISCONTINUED | OUTPATIENT
Start: 2022-10-13 | End: 2022-10-14 | Stop reason: HOSPADM

## 2022-10-13 RX ORDER — ACETAMINOPHEN 325 MG/1
650 TABLET ORAL EVERY 6 HOURS PRN
Status: DISCONTINUED | OUTPATIENT
Start: 2022-10-13 | End: 2022-10-14 | Stop reason: HOSPADM

## 2022-10-13 RX ADMIN — DILTIAZEM HYDROCHLORIDE 10 MG: 5 INJECTION, SOLUTION INTRAVENOUS at 17:38

## 2022-10-13 RX ADMIN — MORPHINE SULFATE 2 MG: 2 INJECTION, SOLUTION INTRAMUSCULAR; INTRAVENOUS at 17:41

## 2022-10-13 RX ADMIN — DILTIAZEM HYDROCHLORIDE 2.5 MG/HR: 5 INJECTION, SOLUTION INTRAVENOUS at 22:34

## 2022-10-13 RX ADMIN — DILTIAZEM HYDROCHLORIDE 10 MG: 5 INJECTION, SOLUTION INTRAVENOUS at 17:05

## 2022-10-13 RX ADMIN — OXYCODONE HYDROCHLORIDE 5 MG: 5 TABLET ORAL at 22:34

## 2022-10-13 RX ADMIN — DILTIAZEM HYDROCHLORIDE 5 MG/HR: 5 INJECTION INTRAVENOUS at 18:48

## 2022-10-13 RX ADMIN — ACETAMINOPHEN 650 MG: 325 TABLET ORAL at 17:36

## 2022-10-13 RX ADMIN — ACETAMINOPHEN 650 MG: 325 TABLET ORAL at 22:34

## 2022-10-13 NOTE — ED ATTENDING ATTESTATION
10/13/2022  I, Angel Osuna MD, saw and evaluated the patient  I have discussed the patient with the resident/non-physician practitioner and agree with the resident's/non-physician practitioner's findings, Plan of Care, and MDM as documented in the resident's/non-physician practitioner's note, except where noted  All available labs and Radiology studies were reviewed  I was present for key portions of any procedure(s) performed by the resident/non-physician practitioner and I was immediately available to provide assistance  At this point I agree with the current assessment done in the Emergency Department  I have conducted an independent evaluation of this patient a history and physical is as follows:    59-year-old female presents to the emergency department from home with chest pressure  Onset a couple of hours ago while seated  She does report having been upset/anxious at the time  She describes a sensation something “sitting” on my chest   Discomfort radiates up to the neck  This feels similar to when she experienced atrial fibrillation with RVR in the past   She appreciates some mild dizziness-slight spinning some lightheadedness  No dyspnea, nausea or vomiting  She has not appreciated any leg swelling or cramping  Reports compliance with all medications and in fact took her evening medications approximately an hour ago hoping they might lower her heart rate  She describes rate alternating between 90s and 140s-mostly between the 120s and 140s after onset of discomfort  She underwent ablation in June and denies awareness of having been in atrial fibrillation since  At that time she had been in AFib unknowingly for at least a couple of months  Past medical history significant for the atrial fibrillation/atrial flutter, hypertension as well as tachycardia induced cardiomyopathy  At times EF was reduced to 30%  Most recent echo reveals EF 45%    She does have an AICD/pacemaker in review of prior rhythms included VT  On exam patient appears mildly uncomfortable  Her speech is clear  She has no pallor or icterus  She is tachycardic and irregular  Lungs clear to auscultation diffusely with good air movement  Abdomen soft and nontender  Lower extremities nontender non edematous with +2 PT pulses  EKG reveals atrial flutter with rate in the 140s  Labs pending  Cardizem ordered for rate lowering; suspect the elevated rate/demand ischemia is causing discomfort  As she has experienced atrial fibrillation at points in the past without distinct symptoms uncertain whether this rhythm truly started today  If discomfort worsens or hypotension develops will consider electrical cardioversion      ED Course         Critical Care Time  CriticalCare Time  Performed by: Aurelia Ayala MD  Authorized by: Aurelia Ayala MD     Critical care provider statement:     Critical care time (minutes):  30    Critical care was necessary to treat or prevent imminent or life-threatening deterioration of the following conditions:  Circulatory failure    Critical care was time spent personally by me on the following activities:  Obtaining history from patient or surrogate, examination of patient, ordering and performing treatments and interventions, ordering and review of laboratory studies, ordering and review of radiographic studies, re-evaluation of patient's condition, review of old charts, evaluation of patient's response to treatment, development of treatment plan with patient or surrogate and discussions with consultants
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
## 2022-10-14 VITALS
HEART RATE: 62 BPM | RESPIRATION RATE: 20 BRPM | DIASTOLIC BLOOD PRESSURE: 85 MMHG | TEMPERATURE: 97.9 F | WEIGHT: 223.11 LBS | SYSTOLIC BLOOD PRESSURE: 133 MMHG | BODY MASS INDEX: 35.02 KG/M2 | HEIGHT: 67 IN | OXYGEN SATURATION: 95 %

## 2022-10-14 PROBLEM — N18.4 STAGE 4 CHRONIC KIDNEY DISEASE (HCC): Status: ACTIVE | Noted: 2018-12-26

## 2022-10-14 LAB
ANION GAP SERPL CALCULATED.3IONS-SCNC: 6 MMOL/L (ref 4–13)
ATRIAL RATE: 141 BPM
BUN SERPL-MCNC: 37 MG/DL (ref 5–25)
CALCIUM SERPL-MCNC: 8.5 MG/DL (ref 8.4–10.2)
CHLORIDE SERPL-SCNC: 105 MMOL/L (ref 96–108)
CO2 SERPL-SCNC: 26 MMOL/L (ref 21–32)
CREAT SERPL-MCNC: 2.24 MG/DL (ref 0.6–1.3)
ERYTHROCYTE [DISTWIDTH] IN BLOOD BY AUTOMATED COUNT: 14.6 % (ref 11.6–15.1)
GFR SERPL CREATININE-BSD FRML MDRD: 23 ML/MIN/1.73SQ M
GLUCOSE SERPL-MCNC: 82 MG/DL (ref 65–140)
HCT VFR BLD AUTO: 33.3 % (ref 34.8–46.1)
HGB BLD-MCNC: 10.3 G/DL (ref 11.5–15.4)
MCH RBC QN AUTO: 27.5 PG (ref 26.8–34.3)
MCHC RBC AUTO-ENTMCNC: 30.9 G/DL (ref 31.4–37.4)
MCV RBC AUTO: 89 FL (ref 82–98)
PLATELET # BLD AUTO: 159 THOUSANDS/UL (ref 149–390)
PMV BLD AUTO: 10.9 FL (ref 8.9–12.7)
POTASSIUM SERPL-SCNC: 3.9 MMOL/L (ref 3.5–5.3)
QRS AXIS: -57 DEGREES
QRSD INTERVAL: 172 MS
QT INTERVAL: 394 MS
QTC INTERVAL: 606 MS
RBC # BLD AUTO: 3.74 MILLION/UL (ref 3.81–5.12)
SODIUM SERPL-SCNC: 137 MMOL/L (ref 135–147)
T WAVE AXIS: 100 DEGREES
TSH SERPL DL<=0.05 MIU/L-ACNC: 6.93 UIU/ML (ref 0.45–4.5)
VENTRICULAR RATE: 142 BPM
WBC # BLD AUTO: 5.92 THOUSAND/UL (ref 4.31–10.16)

## 2022-10-14 PROCEDURE — 93010 ELECTROCARDIOGRAM REPORT: CPT | Performed by: INTERNAL MEDICINE

## 2022-10-14 PROCEDURE — 80048 BASIC METABOLIC PNL TOTAL CA: CPT | Performed by: INTERNAL MEDICINE

## 2022-10-14 PROCEDURE — 84443 ASSAY THYROID STIM HORMONE: CPT | Performed by: NURSE PRACTITIONER

## 2022-10-14 PROCEDURE — RECHECK: Performed by: INTERNAL MEDICINE

## 2022-10-14 PROCEDURE — 99254 IP/OBS CNSLTJ NEW/EST MOD 60: CPT | Performed by: INTERNAL MEDICINE

## 2022-10-14 PROCEDURE — 85027 COMPLETE CBC AUTOMATED: CPT | Performed by: INTERNAL MEDICINE

## 2022-10-14 PROCEDURE — 99223 1ST HOSP IP/OBS HIGH 75: CPT | Performed by: INTERNAL MEDICINE

## 2022-10-14 RX ORDER — AMIODARONE HYDROCHLORIDE 100 MG/1
200 TABLET ORAL DAILY
Qty: 60 TABLET | Refills: 0 | Status: SHIPPED | OUTPATIENT
Start: 2022-10-15 | End: 2022-11-14

## 2022-10-14 RX ORDER — SODIUM CHLORIDE 9 MG/ML
50 INJECTION, SOLUTION INTRAVENOUS CONTINUOUS
Status: DISCONTINUED | OUTPATIENT
Start: 2022-10-14 | End: 2022-10-14

## 2022-10-14 RX ORDER — AMIODARONE HYDROCHLORIDE 200 MG/1
100 TABLET ORAL DAILY
Status: DISCONTINUED | OUTPATIENT
Start: 2022-10-14 | End: 2022-10-14 | Stop reason: HOSPADM

## 2022-10-14 RX ADMIN — METOPROLOL SUCCINATE 150 MG: 100 TABLET, EXTENDED RELEASE ORAL at 06:03

## 2022-10-14 RX ADMIN — NICOTINE 1 PATCH: 7 PATCH, EXTENDED RELEASE TRANSDERMAL at 08:25

## 2022-10-14 RX ADMIN — OXYCODONE HYDROCHLORIDE 5 MG: 5 TABLET ORAL at 13:26

## 2022-10-14 RX ADMIN — SODIUM CHLORIDE 50 ML/HR: 0.9 INJECTION, SOLUTION INTRAVENOUS at 03:22

## 2022-10-14 RX ADMIN — PANTOPRAZOLE SODIUM 40 MG: 40 TABLET, DELAYED RELEASE ORAL at 08:25

## 2022-10-14 RX ADMIN — ACETAMINOPHEN 650 MG: 325 TABLET ORAL at 11:02

## 2022-10-14 RX ADMIN — OXYCODONE HYDROCHLORIDE 5 MG: 5 TABLET ORAL at 06:03

## 2022-10-14 RX ADMIN — AMIODARONE HYDROCHLORIDE 100 MG: 200 TABLET ORAL at 08:25

## 2022-10-14 NOTE — ASSESSMENT & PLAN NOTE
· H/o atrial flutter s/p ablation in June 2022  At home on amiodarone, metoprolol and rivaroxaban  · POA chest pressure and palpitations day of admission following a family argument at home  · EKG with atrial flutter with rate 140's  Troponins mildly elevated x3   · Received 10 mg diltiazem x 2 in ED with some improvement in HR however persistently irregular rhythm so patient was started on diltiazem GGT     · HR 60's in AM so Cardizem drip stopped and restarted Metoprolol 150 mg   · Cardiology recommendation: increase amiodarone to 200 mg PO daily   · Patient asymptomatic today  · Cleared from cardiac standpoint for discharge to home today with outpatient cardiology follow up
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
## 2022-10-14 NOTE — ASSESSMENT & PLAN NOTE
· Baseline Cr 1 8-2 1  · POA Cr 2 2  · Slightly elevated but very close to baseline  · Received IV fluids 50 cc/hour overnight  · Cr today 2 2  · Stable  · Per cardio, hold Entresto on discharge due to CKD
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
## 2022-10-14 NOTE — ASSESSMENT & PLAN NOTE
Patient history of hypertension on amlodipine, doxazosin    Plan:  · Hold amlodipine and doxazosin as patient is on diltiazem drip

## 2022-10-14 NOTE — ASSESSMENT & PLAN NOTE
Wt Readings from Last 3 Encounters:   10/13/22 101 kg (223 lb 1 7 oz)   10/09/22 101 kg (222 lb)   08/23/22 99 3 kg (218 lb 14 4 oz)     Patient with history of CHF with EF of 40-45%, on Entresto      Plan:  · Holding [place]

## 2022-10-14 NOTE — PLAN OF CARE
Problem: Nutrition/Hydration-ADULT  Goal: Nutrient/Hydration intake appropriate for improving, restoring or maintaining nutritional needs  Description: Monitor and assess patient's nutrition/hydration status for malnutrition  Collaborate with interdisciplinary team and initiate plan and interventions as ordered  Monitor patient's weight and dietary intake as ordered or per policy  Utilize nutrition screening tool and intervene as necessary  Determine patient's food preferences and provide high-protein, high-caloric foods as appropriate       INTERVENTIONS:  - Monitor oral intake, urinary output, labs, and treatment plans  - Assess nutrition and hydration status and recommend course of action  - Evaluate amount of meals eaten  - Assist patient with eating if necessary   - Allow adequate time for meals  - Recommend/ encourage appropriate diets, oral nutritional supplements, and vitamin/mineral supplements  - Order, calculate, and assess calorie counts as needed  - Recommend, monitor, and adjust tube feedings and TPN/PPN based on assessed needs  - Assess need for intravenous fluids  - Provide specific nutrition/hydration education as appropriate  - Include patient/family/caregiver in decisions related to nutrition  Outcome: Progressing     Problem: MOBILITY - ADULT  Goal: Maintain or return to baseline ADL function  Description: INTERVENTIONS:  -  Assess patient's ability to carry out ADLs; assess patient's baseline for ADL function and identify physical deficits which impact ability to perform ADLs (bathing, care of mouth/teeth, toileting, grooming, dressing, etc )  - Assess/evaluate cause of self-care deficits   - Assess range of motion  - Assess patient's mobility; develop plan if impaired  - Assess patient's need for assistive devices and provide as appropriate  - Encourage maximum independence but intervene and supervise when necessary  - Involve family in performance of ADLs  - Assess for home care needs following discharge   - Consider OT consult to assist with ADL evaluation and planning for discharge  - Provide patient education as appropriate  Outcome: Progressing  Goal: Maintains/Returns to pre admission functional level  Description: INTERVENTIONS:  - Perform BMAT or MOVE assessment daily    - Set and communicate daily mobility goal to care team and patient/family/caregiver  - Collaborate with rehabilitation services on mobility goals if consulted  - Perform Range of Motion  times a day  - Reposition patient every  hours    - Dangle patient  times a day  - Stand patient  times a day  - Ambulate patient  times a day  - Out of bed to chair  times a day   - Out of bed for meal times a day  - Out of bed for toileting  - Record patient progress and toleration of activity level   Outcome: Progressing

## 2022-10-14 NOTE — DISCHARGE INSTRUCTIONS
Atrial Flutter   WHAT YOU NEED TO KNOW:   Atrial flutter is an irregular heartbeat  It reduces your heart's ability to pump blood, which means you do not get enough oxygen  An irregular heartbeat could lead to a life-threatening blood clot or stroke  DISCHARGE INSTRUCTIONS:   Call your local emergency number (911 in the [address] [facility] [facility]) or have someone call if:   You have any of the following signs of a heart attack:      Squeezing, pressure, or pain in your chest    You may  also have any of the following:     Discomfort or pain in your back, neck, jaw, stomach, or arm    Shortness of breath    Nausea or vomiting    Lightheadedness or a sudden cold sweat    You have any of the following signs of a stroke:      Numbness or drooping on one side of your face     Weakness in an arm or leg    Confusion or difficulty speaking    Dizziness, a severe headache, or vision loss    You feel lightheaded, are short of breath, and have chest pain  You cough up blood  Seek care immediately if:   Your arm or leg feels warm, tender, and painful  It may look swollen and red  Call your doctor or cardiologist if:   Your heart rate is higher or lower than your cardiologist says it should be  You are bruising and bleeding more easily  You have questions or concerns about your condition or care  Medicines: You may need any of the following:  Heart medicines  help control your heart rate and rhythm  Blood thinners  help prevent blood clots  Clots can cause strokes, heart attacks, and death  The following are general safety guidelines to follow while you are taking a blood thinner:    Watch for bleeding and bruising while you take blood thinners  Watch for bleeding from your gums or nose  Watch for blood in your urine and bowel movements  Use a soft washcloth on your skin, and a soft toothbrush to brush your teeth  This can keep your skin and gums from bleeding  If you shave, use an electric shaver   Do not play contact sports  Tell your dentist and other healthcare providers that you take a blood thinner  Wear a bracelet or necklace that says you take this medicine  Do not start or stop any other medicines unless your healthcare provider tells you to  Many medicines cannot be used with blood thinners  Take your blood thinner exactly as prescribed by your healthcare provider  Do not skip does or take less than prescribed  Tell your provider right away if you forget to take your blood thinner, or if you take too much  Warfarin  is a blood thinner that you may need to take  The following are things you should be aware of if you take warfarin:     Foods and medicines can affect the amount of warfarin in your blood  Do not make major changes to your diet while you take warfarin  Warfarin works best when you eat about the same amount of vitamin K every day  Vitamin K is found in green leafy vegetables and certain other foods  Ask for more information about what to eat when you are taking warfarin  You will need to see your healthcare provider for follow-up visits when you are on warfarin  You will need regular blood tests  These tests are used to decide how much medicine you need  Take your medicine as directed  Contact your healthcare provider if you think your medicine is not helping or if you have side effects  Tell him or her if you are allergic to any medicine  Keep a list of the medicines, vitamins, and herbs you take  Include the amounts, and when and why you take them  Bring the list or the pill bottles to follow-up visits  Carry your medicine list with you in case of an emergency  Manage atrial flutter:   Know your target heart rate  Learn how to take your pulse and monitor your heart rate  Manage other health conditions  This includes high blood pressure or cholesterol, sleep apnea, diabetes, and other heart conditions  Take medicine as directed and follow your treatment plan   Your healthcare provider may need to change a medicine you are taking if it is causing your atrial flutter  Do not  stop taking any medicine unless directed by your provider  Do not smoke  Nicotine and other chemicals in cigarettes and cigars can cause heart and lung damage  Ask your healthcare provider for information if you currently smoke and need help to quit  E-cigarettes or smokeless tobacco still contain nicotine  Talk to your healthcare provider before you use these products  Know the risks if you choose to drink alcohol  Alcohol can increase your risk for atrial flutter or make it harder to manage  Ask your healthcare provider if it is okay for you to drink any alcohol  He or she can help you set limits for the number of drinks you have in 24 hours and in a week  A drink of alcohol is 12 ounces of beer, 5 ounces of wine, or 1½ ounces of liquor  Eat heart-healthy foods  Heart healthy foods will help keep your cholesterol low  These include fruits, vegetables, whole-grain breads, low-fat dairy products, beans, lean meats, and fish  Replace butter and margarine with heart-healthy oils such as olive oil and canola oil  Maintain a healthy weight  Ask your healthcare provider what a healthy weight is for you  Ask him or her to help you create a weight loss plan if you are overweight  Follow up with your doctor or cardiologist as directed: You may need ongoing tests or treatment  Write down your questions so you remember to ask them during your visits  © Copyright [employer] 2022 Information is for End User's use only and may not be sold, redistributed or otherwise used for commercial purposes  All illustrations and images included in CareNotes® are the copyrighted property of [employer]  or Bob Holder   The above information is an  only  It is not intended as medical advice for individual conditions or treatments   Talk to your doctor, nurse or pharmacist before following any medical regimen to see if it is safe and effective for you

## 2022-10-14 NOTE — ASSESSMENT & PLAN NOTE
· Patient has elevated troponin  0hr 56, 2hr 51, 4hr 57  · Likely secondary to demand ischemia given her rapid heart rate  · HR 60's today   Patient denies chest pain  · Cleared by cardiology for dc today

## 2022-10-14 NOTE — ASSESSMENT & PLAN NOTE
Wt Readings from Last 3 Encounters:   10/13/22 101 kg (223 lb 1 7 oz)   10/09/22 101 kg (222 lb)   08/23/22 99 3 kg (218 lb 14 4 oz)     Patient with history of CHF with EF of 30 % per cardiology note, on Entresto      Plan:  · Holding [place] [place]

## 2022-10-14 NOTE — ASSESSMENT & PLAN NOTE
Wt Readings from Last 3 Encounters:   10/13/22 101 kg (223 lb 1 7 oz)   10/09/22 101 kg (222 lb)   08/23/22 99 3 kg (218 lb 14 4 oz)     · Patient with history of CHF with EF of 40-45%, on Entresto    · Appears euvolemic on exam  · Per cardiology, hold Entresto on discharge  · Outpatient cardiology follow up

## 2022-10-14 NOTE — ASSESSMENT & PLAN NOTE
Patient with increase in creatinine 2 26, creatinine on 10/9/22 was 1 77  Baseline between 1 6-1 8      Plan:  · Hold Lasix

## 2022-10-14 NOTE — DISCHARGE SUMMARY
[facility]  Discharge- Nile Burr 1965, 62 y o  female MRN: [id number]  Unit/Bed#: S -01 Encounter: [id number]  Primary Care Provider: Yanet Payne MD   Date and time admitted to hospital: 10/13/2022  3:59 PM    * Atypical atrial flutter [facility])  Assessment & Plan  · H/o atrial flutter s/p ablation in June 2022  At home on amiodarone, metoprolol and rivaroxaban  · POA chest pressure and palpitations day of admission following a family argument at home  · EKG with atrial flutter with rate 140's  Troponins mildly elevated x3   · Received 10 mg diltiazem x 2 in ED with some improvement in HR however persistently irregular rhythm so patient was started on diltiazem GGT  · HR 60's in AM so Cardizem drip stopped and restarted Metoprolol 150 mg   · Cardiology recommendation: increase amiodarone to 200 mg PO daily   · Patient asymptomatic today  · Cleared from cardiac standpoint for discharge to home today with outpatient cardiology follow up       Combined systolic and diastolic heart failure ([facility] [address] )  Assessment & Plan  Wt Readings from Last 3 Encounters:   10/13/22 101 kg (223 lb 1 7 oz)   10/09/22 101 kg (222 lb)   08/23/22 99 3 kg (218 lb 14 4 oz)     · Patient with history of CHF with EF of 40-45%, on Entresto  · Appears euvolemic on exam  · Per cardiology, hold Entresto on discharge  · Outpatient cardiology follow up          Hypertension  Assessment & Plan  · Continue home antihypertensives    Elevated troponin  Assessment & Plan  · Patient has elevated troponin  0hr 56, 2hr 51, 4hr 57  · Likely secondary to demand ischemia given her rapid heart rate  · HR 60's today  Patient denies chest pain  · Cleared by cardiology for dc today    Smoking  Assessment & Plan  Patient is a current cigarette smoker    Encouraged tobacco cessation    Stage 4 chronic kidney disease (HCC)  Assessment & Plan  · Baseline Cr 1 8-2 1  · POA Cr 2 2  · Slightly elevated but very close to baseline  · Received IV fluids 50 cc/hour overnight  · Cr today 2 2  · Stable  · Per cardio, hold Entresto on discharge due to CKD          Medical Problems             Resolved Problems  Date Reviewed: 10/14/2022   None               Discharging Physician / Practitioner: Meredith Moe  PCP: Ortiz Martinez MD  Admission Date:   Admission Orders (From admission, onward)     Ordered        10/13/22 1847  INPATIENT ADMISSION  Once                      Discharge Date: 10/14/22    Consultations During Hospital Stay:  · Cardiology    Procedures Performed:   · none    Significant Findings / Test Results:   · See below    Incidental Findings:   · none    Test Results Pending at Discharge (will require follow up):   · none     Outpatient Tests Requested:  · none    Complications:  none    Reason for Admission:  Chest pain, palpitations    Hospital Course:   Desire Naranjo is a 62 y o  female patient with PMH of atrial flutter s/p ablation 4 months ago, AICD/pacemaker for h/o VT, hypertension, combined CHF (EF 45%), and hyperlipidemia who originally presented to the hospital on 10/13/2022 due to reports of chest pressure and rapid heart rate  Symptoms started at around 10 AM the day of admission following a family argument  EKG showed atrial flutter with rapid ventricular rate in 140's with mild troponin elevation  She received two boluses of Cardizem and received Cardizem drip overnight  Heart rate improved significantly, as did symptoms  She was switched to her home Metoprolol this morning  Patient was seen by cardiology and cleared for discharge today  She is stable for discharge to home with outpatient cardiology follow up  Please see above list of diagnoses and related plan for additional information  Condition at Discharge: stable    Discharge Day Visit / Exam:   Subjective:  Seen this morning in no acute distress  Reports that she feels much better than yesterday    Denies chest pain, palpitations, shortness of breath, or headache  Usha Antonio for discharge to home today  Vitals: Blood Pressure: 133/85 (10/14/22 0752)  Pulse: 62 (10/14/22 0251)  Temperature: 97 9 °F (36 6 °C) (10/14/22 0752)  Temp Source: Oral (10/13/22 1536)  Respirations: 20 (10/13/22 1800)  Height: 5' 7" (170 2 cm) (last ht assessment) (10/14/22 1238)  Weight - Scale: 101 kg (223 lb 1 7 oz) (10/13/22 1534)  SpO2: 95 % (10/13/22 2006)    Exam:   Physical Exam  Vitals reviewed  Constitutional:       General: She is not in acute distress  Appearance: She is obese  She is not toxic-appearing  HENT:      Head: Normocephalic and atraumatic  Mouth/Throat:      Mouth: Mucous membranes are moist       Pharynx: Oropharynx is clear  Eyes:      Extraocular Movements: Extraocular movements intact  Conjunctiva/sclera: Conjunctivae normal       Pupils: Pupils are equal, round, and reactive to light  Cardiovascular:      Rate and Rhythm: Normal rate  Pulses: Normal pulses  Pulmonary:      Effort: Pulmonary effort is normal  No respiratory distress  Breath sounds: Normal breath sounds  Abdominal:      General: Bowel sounds are normal  There is no distension  Palpations: Abdomen is soft  Tenderness: There is no abdominal tenderness  Musculoskeletal:         General: No swelling or tenderness  Normal range of motion  Cervical back: Normal range of motion and neck supple  Skin:     General: Skin is warm and dry  Neurological:      General: No focal deficit present  Mental Status: She is alert and oriented to person, place, and time  Psychiatric:         Mood and Affect: Mood normal          Behavior: Behavior normal          Thought Content: Thought content normal          Judgment: Judgment normal           Discussion with Family: Patient declined call to        Discharge instructions/Information to patient and family:   See after visit summary for information provided to patient and family  Provisions for Follow-Up Care:  See after visit summary for information related to follow-up care and any pertinent home health orders  Disposition:   Home    Planned Readmission: none     Discharge Statement:  I spent 30 minutes discharging the patient  This time was spent on the day of discharge  I had direct contact with the patient on the day of discharge  Greater than 50% of the total time was spent examining patient, answering all patient questions, arranging and discussing plan of care with patient as well as directly providing post-discharge instructions  Additional time then spent on discharge activities  Discharge Medications:  See after visit summary for reconciled discharge medications provided to patient and/or family        **Please Note: This note may have been constructed using a voice recognition system**

## 2022-10-14 NOTE — ASSESSMENT & PLAN NOTE
Patient with history of atrial flutter status post ablation by EP  Patient has previously been well controlled after ablation on 100 mg  amiodarone and rivaroxaban  Patient first noted symptoms this morning at work around 10:00 a m  Which included chest pressure, jaw pain, left arm pain and elevated heart rate  Patient was still experiencing some chest pain but it had improved  Left arm pain and jaw pain have resolved  Patient received 2 boluses of 10 mg of diltiazem in the emergency department with some improvement in heart rate  Patient was started on diltiazem GGT       Plan:  · Continue diltiazem ggt  · Continue telemetry  · Continue Lasix  · Continue rivaroxaban  · Holding amiodarone, metoprolol, doxazosin because patient is on diltiazem drip

## 2022-10-14 NOTE — UTILIZATION REVIEW
Initial Clinical Review    Admission: Date/Time/Statement:   Admission Orders (From admission, onward)     Ordered        10/13/22 1847  INPATIENT ADMISSION  Once                      Orders Placed This Encounter   Procedures   • INPATIENT ADMISSION     Standing Status:   Standing     Number of Occurrences:   1     Order Specific Question:   Level of Care     Answer:   Med Surg [16]     Order Specific Question:   Estimated length of stay     Answer:   More than 2 Midnights     Order Specific Question:   Certification     Answer:   I certify that inpatient services are medically necessary for this patient for a duration of greater than two midnights  See H&P and MD Progress Notes for additional information about the patient's course of treatment  ED Arrival Information     Expected   -    Arrival   10/13/2022 15:31    Acuity   Emergent            Means of arrival   Walk-In    Escorted by   [facility]    Admission type   Emergency            Arrival complaint   Chest Pain/Palpitations           Chief Complaint   Patient presents with   • Chest Pain     Pt presents to the ED with reports of left sided chest pain following a big argument  Radiates to the left jaw  Nausea no vomiting  Dizziness  Initial Presentation: 62 y o  female with a PMH of atypical atrial flutter status post ablation 4 months ago, hypertension, CHF, hypertrophic cardiomyopathy, hyperlipidemia, hepatitis-C who presents with chest pressure, jaw pain, left arm pain and rapid heart rate starting around 10:00 a m 10/13/2022  She also reported some mild lightheadedness and headache but denies shortness breath, nausea, or diaphoresis  She has experienced symptoms like this before when she had her prior atrial flutter episode  Upon seeing the patient her jaw pain and left arm pain had but she is still experiencing chest pain although this has improved    Patient follows with electrophysiology and most recently saw them 07/21/2022  Patient admits to cocaine use approximately 1 month ago but denies any more recent use  Plan: Inpatient admission for evaluation and treatment of atypical Aflutter, CHF: Cardizem drip-attempt to wean and start metoprolol, telemetry, hold lasix due to DANIELA, continue rivaroxaban and amiodarone, consult Cardiology, hold metoprolol and doxasosin due to cardizem drip, hold Entresto, IV fluids, hold lasix  Date: 10/14   Day 2:     Cardiology consult: will increase home amiodarone to 200 mg daily  Continue metoprolol and Xarelto       ED Triage Vitals [10/13/22 1536]   Temperature Pulse Respirations Blood Pressure SpO2   97 6 °F (36 4 °C) (!) 120 (!) 24 127/96 97 %      Temp Source Heart Rate Source Patient Position - Orthostatic VS BP Location FiO2 (%)   Oral Monitor Sitting Left arm --      Pain Score       9          Wt Readings from Last 1 Encounters:   10/13/22 101 kg (223 lb 1 7 oz)     Additional Vital Signs:     Date/Time Temp Pulse Resp BP MAP (mmHg) SpO2 O2 Device   10/14/22 07:52:44 97 9 °F (36 6 °C) -- -- 133/85 101 -- --   10/14/22 06:00:31 -- -- -- 148/92 111 -- --   10/14/22 02:51:25 -- 62 -- 139/91 107 -- --   10/13/22 23:35:47 -- 75 -- 118/66 83 -- --   10/13/22 20:06:39 98 8 °F (37 1 °C) 102  -- 128/76 93 95 % None (Room air)   10/13/22 20:03:29 98 8 °F (37 1 °C) 102  -- 128/76 93 95 % --   10/13/22 1943 -- 128 Abnormal  -- 115/89 -- 97 % --   10/13/22 1930 -- 127 Abnormal  -- 109/77 87 96 % --   10/13/22 1900 -- 133 Abnormal  -- 106/75 80 97 % --   10/13/22 1800 -- 99 20 153/75 103 95 % --   10/13/22 1747 -- 96 -- -- -- 95 % --   10/13/22 1730 -- 126 Abnormal  20 126/98 108 95 % --   10/13/22 1715 -- 128 Abnormal  20 118/78 89 95 % --   10/13/22 1700 -- 136 Abnormal  20 118/78 -- 96 % None (Room air)       Pertinent Labs/Diagnostic Test Results:   No orders to display         Lab Units 10/14/22  0304 10/13/22  1555   WBC Thousand/uL 5 92 9 33   HEMOGLOBIN g/dL 10 3* 11 7   HEMATOCRIT % 33 3* 37 3   PLATELETS Thousands/uL 159 206   NEUTROS ABS Thousands/µL  --  6 81         Lab Units 10/14/22  0305 10/13/22  1555   SODIUM mmol/L 137 139   POTASSIUM mmol/L 3 9 3 6   CHLORIDE mmol/L 105 103   CO2 mmol/L 26 26   ANION GAP mmol/L 6 10   BUN mg/dL 37* 36*   CREATININE mg/dL 2 24* 2 26*   EGFR ml/min/1 73sq m 23 23   CALCIUM mg/dL 8 5 9 5     Lab Units 10/13/22  1555   AST U/L 16   ALT U/L 15   ALK PHOS U/L 58   TOTAL PROTEIN g/dL 7 8   ALBUMIN g/dL 4 3   TOTAL BILIRUBIN mg/dL 0 35         Lab Units 10/14/22  0305 10/13/22  1555   GLUCOSE RANDOM mg/dL 82 122           Results from last 7 days   Lab Units 10/13/22  1952 10/13/22  1834 10/13/22  1555   HS TNI 0HR ng/L  --   --  56*   HS TNI 2HR ng/L  --  51*  --    HSTNI D2 ng/L  --  -5  --    HS TNI 4HR ng/L 57*  --   --    HSTNI D4 ng/L 1  --   --      Results from last 7 days   Lab Units 10/13/22  1555   D-DIMER QUANTITATIVE ug/ml FEU 0 32             ED Treatment:   Medication Administration from 10/13/2022 1531 to 10/13/2022 2000       Date/Time Order Dose Route Action     10/13/2022 1705 diltiazem (CARDIZEM) injection 10 mg 10 mg Intravenous Given     10/13/2022 1736 acetaminophen (TYLENOL) tablet 650 mg 650 mg Oral Given     10/13/2022 1741 morphine injection 2 mg 2 mg Intravenous Given     10/13/2022 1738 diltiazem (CARDIZEM) injection 10 mg 10 mg Intravenous Given     10/13/2022 1943 diltiazem (CARDIZEM) 125 mg in sodium chloride 0 9 % 125 mL infusion 10 mg/hr Intravenous Rate/Dose Change     10/13/2022 1910 diltiazem (CARDIZEM) 125 mg in sodium chloride 0 9 % 125 mL infusion 7 5 mg/hr Intravenous Rate/Dose Change     10/13/2022 1848 diltiazem (CARDIZEM) 125 mg in sodium chloride 0 9 % 125 mL infusion 5 mg/hr Intravenous New Bag        Past Medical History:   Diagnosis Date   • ACS (acute coronary syndrome) ([facility] ) 2/7/2021   • Arrhythmia    • Arthritis    • Atrial fibrillation ([facility] )    • Atrial fibrillation with rapid ventricular response ([facility] ) 3/20/2016   • Breast lump    • CKD (chronic kidney disease) stage 3, GFR 30-59 ml/min (HCC)    • Disease of thyroid gland    • 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 ([facility] ) 5/25/2020   • GERD (gastroesophageal reflux disease)    • H/O transfusion 1987   • Hepatitis C     resolved   • Hepatitis C    • Hyperlipidemia    • Hypertension    • Irregular heart beat    • Pacemaker    • Sleep apnea     no cpap   • Tachycardia      Present on Admission:  • DANIELA (acute kidney injury) ([facility] )  • Atypical atrial flutter (HCC)  • Elevated troponin      Admitting Diagnosis: Chest pain, unspecified [R07 9]  Chest pain [R07 9]  Atrial flutter with rapid ventricular response (HCC) [I48 92]  Age/Sex: 62 y o  female  Admission Orders:  Scheduled Medications:  amiodarone, 100 mg, Oral, Daily  Diclofenac Sodium, 2 g, Topical, 4x Daily  metoprolol succinate, 150 mg, Oral, BID  nicotine, 1 patch, Transdermal, Daily  pantoprazole, 40 mg, Oral, Daily  rivaroxaban, 15 mg, Oral, QPM      Continuous IV Infusions:    diltiazem (CARDIZEM) 125 mg in sodium chloride 0 9 % 125 mL infusion  Rate: 1-15 mL/hr Dose: 1-15 mg/hr  Freq: Titrated Route: IV  Last Dose: Stopped (10/14/22 0305)  Start: 10/13/22 2230 End: 10/14/22 0306     PRN Meds:  acetaminophen, 650 mg, Oral, Q6H PRN  ondansetron, 4 mg, Intravenous, Q6H PRN  oxyCODONE, 5 mg, Oral, Q6H PRN        IP CONSULT TO CARDIOLOGY    Network Utilization Review Department  ATTENTION: Please call with any questions or concerns to [phone] and carefully listen to the prompts so that you are directed to the right person  All voicemails are confidential   [facility] Bustamante all requests for admission clinical reviews, approved or denied determinations and any other requests to dedicated fax number below belonging to the campus where the patient is receiving treatment   List of dedicated fax numbers for the Facilities:  FACILITY NAME UR FAX NUMBER   ADMISSION DENIALS (Administrative/Medical [facility]) [phone]   [address]  (Maternity/NICU/Pediatrics) [facility] [address] Katarina Kramer  [phone]   [address] [place] [id number] [place] [address] [address] [facility] [place] [address] [phone]

## 2022-10-14 NOTE — UTILIZATION REVIEW
NOTIFICATION OF INPATIENT ADMISSION   AUTHORIZATION REQUEST   SERVICING FACILITY:   [address] [facility] [facility], [address] [place] [address]  Tax ID: [id number]  NPI: [id number]   ATTENDING PROVIDER:  Attending Name and NPI#: Alana Bishop, Wandy Crain [[id number]]  Address: [address] [place] [address] Dr [facility], [address] [place] [address]  Phone: [phone]     ADMISSION INFORMATION:  Place of Service: Inpatient [facility]  [address]  Place of Service Code: 21  Inpatient Admission Date/Time: 10/13/22  6:47 PM  Discharge Date/Time: No discharge date for patient encounter  Admitting Diagnosis Code/Description:  Chest pain, unspecified [R07 9]  Chest pain [R07 9]  Atrial flutter with rapid ventricular response ([address] ) [I48 92]     UTILIZATION REVIEW CONTACT:  Chrissy Lawrence, Utilization   Network Utilization Review Department  Phone: [phone]  Fax: [phone]  Email: [email or web address]  Contact for approvals/pending authorizations, clinical reviews, and discharge  PHYSICIAN ADVISORY SERVICES:  Medical Necessity Denial & Uldi-xn-Ckyc Review  Phone: [phone]  Fax: [phone]  Email: [email or web address]

## 2022-10-14 NOTE — DISCHARGE INSTR - AVS FIRST PAGE
Dear Latrice Geiger,     It was our pleasure to care for you here at [facility]  It is our hope that we were always able to exceed the expected standards for your care during your stay  You were hospitalized due to rapid heart rate  You were cared for on the 3rd floor by Polo Cheadle under the service of Sera Padilla MD with the [facility] Internal Medicine Hospitalist Group who covers for your primary care physician (PCP), Scott Bolaños MD, while you were hospitalized  If you have any questions or concerns related to this hospitalization, you may contact us at [phone]  For follow up as well as any medication refills, we recommend that you follow up with your primary care physician  A registered nurse will reach out to you by phone within a few days after your discharge to answer any additional questions that you may have after going home  However, at this time we provide for you here, the most important instructions / recommendations at discharge:     Notable Medication Adjustments -   Please take Amiodarone 200 mg daily  Stop taking Entresto  Continue other home medications as usual  Important follow up information -   Please follow-up with your primary care physician within 1-2 weeks   Please follow-up with your cardiologist  Other Instructions -   If you have any recurrent or worsening symptoms, please report to the ER  Please review this entire after visit summary as additional general instructions including medication list, appointments, activity, diet, any pertinent wound care, and other additional recommendations from your care team that may be provided for you        Sincerely,     Delilah Craig Barley

## 2022-10-14 NOTE — ASSESSMENT & PLAN NOTE
Patient has elevated troponin  0hr 56, 2hr 51, 4hr 57  Likely secondary to demand ischemia given her rapid heart rate      Plan:  · Continue to monitor  · Continue with diltiazem for rate control

## 2022-10-17 NOTE — UTILIZATION REVIEW
NOTIFICATION OF ADMISSION DISCHARGE   This is a Notification of Discharge from [facility]  Please be advised that this patient has been discharge from our facility  Below you will find the admission and discharge date and time including the patient’s disposition  UTILIZATION REVIEW CONTACT:  José Glez MA  Utilization   Network Utilization Review Department  Phone: [phone] x carefully listen to the prompts  All voicemails are confidential   Email: [email or web address]     ADMISSION INFORMATION  PRESENTATION DATE: 10/13/2022  3:59 PM  OBERVATION ADMISSION DATE:   INPATIENT ADMISSION DATE: 10/13/22  6:47 PM   DISCHARGE DATE: 10/14/2022  4:19 PM  DISPOSITION: Home/Self Care Home/Self Care      IMPORTANT INFORMATION:  Send all requests for admission clinical reviews, approved or denied determinations and any other requests to dedicated fax number below belonging to the campus where the patient is receiving treatment   List of dedicated fax numbers:  [address] DENIALS (Administrative/Medical Necessity) [phone]   [address] (Maternity/NICU/Pediatrics) [phone]   [facility] [phone]   [facility] [phone]   [address] [phone]   220 [facility] [phone] 90 [facility] [phone]   [address] [place] [address] [phone]   [facility]  [phone]   4057 [facility] [phone]   412 [facility] [address] [facility] [phone]

## 2022-10-22 ENCOUNTER — HOSPITAL ENCOUNTER (EMERGENCY)
Facility: HOSPITAL | Age: 57
Discharge: HOME | End: 2022-10-22
Attending: EMERGENCY MEDICINE
Payer: COMMERCIAL

## 2022-10-22 ENCOUNTER — APPOINTMENT (EMERGENCY)
Dept: RADIOLOGY | Facility: HOSPITAL | Age: 57
End: 2022-10-22
Payer: COMMERCIAL

## 2022-10-22 VITALS
RESPIRATION RATE: 19 BRPM | HEART RATE: 100 BPM | TEMPERATURE: 97.9 F | OXYGEN SATURATION: 97 % | SYSTOLIC BLOOD PRESSURE: 171 MMHG | DIASTOLIC BLOOD PRESSURE: 74 MMHG

## 2022-10-22 DIAGNOSIS — S90.32XA CONTUSION OF LEFT FOOT, INITIAL ENCOUNTER: Primary | ICD-10-CM

## 2022-10-22 PROCEDURE — 73630 X-RAY EXAM OF FOOT: CPT

## 2022-10-22 PROCEDURE — 99283 EMERGENCY DEPT VISIT LOW MDM: CPT | Performed by: PHYSICIAN ASSISTANT

## 2022-10-22 NOTE — DISCHARGE INSTRUCTIONS
Follow-up with your podiatrist   Apply ice as needed  Continue with tylenol every 6 hours as needed for pain
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
## 2022-11-04 ENCOUNTER — HOSPITAL ENCOUNTER (INPATIENT)
Facility: HOSPITAL | Age: 57
LOS: 1 days | Discharge: HOME | End: 2022-11-06
Attending: EMERGENCY MEDICINE | Admitting: INTERNAL MEDICINE

## 2022-11-04 DIAGNOSIS — M79.89 LEG SWELLING: ICD-10-CM

## 2022-11-04 DIAGNOSIS — I50.9 ACUTE EXACERBATION OF CHF (CONGESTIVE HEART FAILURE) (HCC): Primary | ICD-10-CM

## 2022-11-04 DIAGNOSIS — I12.9 BENIGN HYPERTENSION WITH CKD (CHRONIC KIDNEY DISEASE) STAGE III (HCC): ICD-10-CM

## 2022-11-04 DIAGNOSIS — R07.9 CHEST PAIN, UNSPECIFIED TYPE: ICD-10-CM

## 2022-11-04 DIAGNOSIS — M54.9 BACK PAIN: ICD-10-CM

## 2022-11-04 DIAGNOSIS — R05.9 COUGH: ICD-10-CM

## 2022-11-04 DIAGNOSIS — N18.30 BENIGN HYPERTENSION WITH CKD (CHRONIC KIDNEY DISEASE) STAGE III (HCC): ICD-10-CM

## 2022-11-04 DIAGNOSIS — R09.02 HYPOXIA: ICD-10-CM

## 2022-11-05 ENCOUNTER — APPOINTMENT (EMERGENCY)
Dept: RADIOLOGY | Facility: HOSPITAL | Age: 57
End: 2022-11-05

## 2022-11-05 PROBLEM — J96.01 ACUTE RESPIRATORY FAILURE WITH HYPOXIA (HCC): Status: ACTIVE | Noted: 2022-11-05

## 2022-11-05 PROBLEM — I50.33 ACUTE ON CHRONIC HEART FAILURE WITH PRESERVED EJECTION FRACTION (HCC): Status: ACTIVE | Noted: 2020-03-09

## 2022-11-05 PROBLEM — M54.2 NECK PAIN: Status: ACTIVE | Noted: 2022-11-05

## 2022-11-05 PROBLEM — M54.42 LEFT-SIDED LOW BACK PAIN WITH SCIATICA: Status: ACTIVE | Noted: 2022-11-05

## 2022-11-05 PROBLEM — M54.50 LEFT LOW BACK PAIN: Status: ACTIVE | Noted: 2022-11-05

## 2022-11-05 LAB
2HR DELTA HS TROPONIN: 3 NG/L
4HR DELTA HS TROPONIN: 8 NG/L
ALBUMIN SERPL BCP-MCNC: 3.9 G/DL (ref 3.5–5)
ALP SERPL-CCNC: 61 U/L (ref 34–104)
ALT SERPL W P-5'-P-CCNC: 15 U/L (ref 7–52)
ANION GAP SERPL CALCULATED.3IONS-SCNC: 5 MMOL/L (ref 4–13)
ANION GAP SERPL CALCULATED.3IONS-SCNC: 5 MMOL/L (ref 4–13)
APTT PPP: 37 SECONDS (ref 23–37)
AST SERPL W P-5'-P-CCNC: 17 U/L (ref 13–39)
BACTERIA UR QL AUTO: NORMAL /HPF
BASOPHILS # BLD AUTO: 0.03 THOUSANDS/ÂΜL (ref 0–0.1)
BASOPHILS NFR BLD AUTO: 0 % (ref 0–1)
BILIRUB SERPL-MCNC: 0.55 MG/DL (ref 0.2–1)
BILIRUB UR QL STRIP: NEGATIVE
BNP SERPL-MCNC: 940 PG/ML (ref 0–100)
BUN SERPL-MCNC: 26 MG/DL (ref 5–25)
BUN SERPL-MCNC: 27 MG/DL (ref 5–25)
CALCIUM SERPL-MCNC: 8.5 MG/DL (ref 8.4–10.2)
CALCIUM SERPL-MCNC: 8.9 MG/DL (ref 8.4–10.2)
CARDIAC TROPONIN I PNL SERPL HS: 47 NG/L
CARDIAC TROPONIN I PNL SERPL HS: 50 NG/L
CARDIAC TROPONIN I PNL SERPL HS: 55 NG/L
CHLORIDE SERPL-SCNC: 107 MMOL/L (ref 96–108)
CHLORIDE SERPL-SCNC: 108 MMOL/L (ref 96–108)
CLARITY UR: CLEAR
CO2 SERPL-SCNC: 26 MMOL/L (ref 21–32)
CO2 SERPL-SCNC: 28 MMOL/L (ref 21–32)
COLOR UR: ABNORMAL
CREAT SERPL-MCNC: 1.78 MG/DL (ref 0.6–1.3)
CREAT SERPL-MCNC: 1.8 MG/DL (ref 0.6–1.3)
D DIMER PPP FEU-MCNC: 0.5 UG/ML FEU
EOSINOPHIL # BLD AUTO: 0.08 THOUSAND/ÂΜL (ref 0–0.61)
EOSINOPHIL NFR BLD AUTO: 1 % (ref 0–6)
ERYTHROCYTE [DISTWIDTH] IN BLOOD BY AUTOMATED COUNT: 14.2 % (ref 11.6–15.1)
ERYTHROCYTE [DISTWIDTH] IN BLOOD BY AUTOMATED COUNT: 14.3 % (ref 11.6–15.1)
FLUAV RNA RESP QL NAA+PROBE: NEGATIVE
FLUBV RNA RESP QL NAA+PROBE: NEGATIVE
GFR SERPL CREATININE-BSD FRML MDRD: 30 ML/MIN/1.73SQ M
GFR SERPL CREATININE-BSD FRML MDRD: 31 ML/MIN/1.73SQ M
GLUCOSE SERPL-MCNC: 110 MG/DL (ref 65–140)
GLUCOSE SERPL-MCNC: 97 MG/DL (ref 65–140)
GLUCOSE UR STRIP-MCNC: NEGATIVE MG/DL
HCT VFR BLD AUTO: 28.1 % (ref 34.8–46.1)
HCT VFR BLD AUTO: 29.8 % (ref 34.8–46.1)
HGB BLD-MCNC: 8.6 G/DL (ref 11.5–15.4)
HGB BLD-MCNC: 9.2 G/DL (ref 11.5–15.4)
HGB UR QL STRIP.AUTO: NEGATIVE
IMM GRANULOCYTES # BLD AUTO: 0.02 THOUSAND/UL (ref 0–0.2)
IMM GRANULOCYTES NFR BLD AUTO: 0 % (ref 0–2)
INR PPP: 2.63 (ref 0.84–1.19)
KETONES UR STRIP-MCNC: NEGATIVE MG/DL
LACTATE SERPL-SCNC: 0.6 MMOL/L (ref 0.5–2)
LEUKOCYTE ESTERASE UR QL STRIP: NEGATIVE
LYMPHOCYTES # BLD AUTO: 1.14 THOUSANDS/ÂΜL (ref 0.6–4.47)
LYMPHOCYTES NFR BLD AUTO: 17 % (ref 14–44)
MCH RBC QN AUTO: 26.8 PG (ref 26.8–34.3)
MCH RBC QN AUTO: 26.9 PG (ref 26.8–34.3)
MCHC RBC AUTO-ENTMCNC: 30.6 G/DL (ref 31.4–37.4)
MCHC RBC AUTO-ENTMCNC: 30.9 G/DL (ref 31.4–37.4)
MCV RBC AUTO: 87 FL (ref 82–98)
MCV RBC AUTO: 88 FL (ref 82–98)
MONOCYTES # BLD AUTO: 0.46 THOUSAND/ÂΜL (ref 0.17–1.22)
MONOCYTES NFR BLD AUTO: 7 % (ref 4–12)
NEUTROPHILS # BLD AUTO: 5.03 THOUSANDS/ÂΜL (ref 1.85–7.62)
NEUTS SEG NFR BLD AUTO: 75 % (ref 43–75)
NITRITE UR QL STRIP: NEGATIVE
NON-SQ EPI CELLS URNS QL MICRO: NORMAL /HPF
NRBC BLD AUTO-RTO: 0 /100 WBCS
PH UR STRIP.AUTO: 6 [PH]
PLATELET # BLD AUTO: 141 THOUSANDS/UL (ref 149–390)
PLATELET # BLD AUTO: 161 THOUSANDS/UL (ref 149–390)
PMV BLD AUTO: 11.2 FL (ref 8.9–12.7)
PMV BLD AUTO: 11.6 FL (ref 8.9–12.7)
POTASSIUM SERPL-SCNC: 3.8 MMOL/L (ref 3.5–5.3)
POTASSIUM SERPL-SCNC: 4.3 MMOL/L (ref 3.5–5.3)
PROT SERPL-MCNC: 7.1 G/DL (ref 6.4–8.4)
PROT UR STRIP-MCNC: ABNORMAL MG/DL
PROTHROMBIN TIME: 28.4 SECONDS (ref 11.6–14.5)
RBC # BLD AUTO: 3.2 MILLION/UL (ref 3.81–5.12)
RBC # BLD AUTO: 3.43 MILLION/UL (ref 3.81–5.12)
RBC #/AREA URNS AUTO: NORMAL /HPF
RSV RNA RESP QL NAA+PROBE: NEGATIVE
SARS-COV-2 RNA RESP QL NAA+PROBE: NEGATIVE
SODIUM SERPL-SCNC: 139 MMOL/L (ref 135–147)
SODIUM SERPL-SCNC: 140 MMOL/L (ref 135–147)
SP GR UR STRIP.AUTO: 1.01 (ref 1–1.03)
UROBILINOGEN UR STRIP-ACNC: <2 MG/DL
WBC # BLD AUTO: 4.95 THOUSAND/UL (ref 4.31–10.16)
WBC # BLD AUTO: 6.76 THOUSAND/UL (ref 4.31–10.16)
WBC #/AREA URNS AUTO: NORMAL /HPF

## 2022-11-05 RX ORDER — HYDROMORPHONE HCL/PF 1 MG/ML
0.5 SYRINGE (ML) INJECTION ONCE
Status: COMPLETED | OUTPATIENT
Start: 2022-11-05 | End: 2022-11-05

## 2022-11-05 RX ORDER — OXYCODONE HYDROCHLORIDE 5 MG/1
5 TABLET ORAL EVERY 6 HOURS PRN
Status: DISCONTINUED | OUTPATIENT
Start: 2022-11-05 | End: 2022-11-06 | Stop reason: HOSPADM

## 2022-11-05 RX ORDER — OXYCODONE HYDROCHLORIDE 10 MG/1
10 TABLET ORAL EVERY 6 HOURS PRN
Status: DISCONTINUED | OUTPATIENT
Start: 2022-11-05 | End: 2022-11-06 | Stop reason: HOSPADM

## 2022-11-05 RX ORDER — LIDOCAINE 50 MG/G
2 PATCH TOPICAL DAILY
Status: DISCONTINUED | OUTPATIENT
Start: 2022-11-05 | End: 2022-11-06 | Stop reason: HOSPADM

## 2022-11-05 RX ORDER — LABETALOL HYDROCHLORIDE 5 MG/ML
10 INJECTION, SOLUTION INTRAVENOUS EVERY 4 HOURS PRN
Status: DISCONTINUED | OUTPATIENT
Start: 2022-11-05 | End: 2022-11-06 | Stop reason: HOSPADM

## 2022-11-05 RX ORDER — DOXAZOSIN MESYLATE 1 MG/1
2 TABLET ORAL
Status: DISCONTINUED | OUTPATIENT
Start: 2022-11-05 | End: 2022-11-06 | Stop reason: HOSPADM

## 2022-11-05 RX ORDER — AMIODARONE HYDROCHLORIDE 200 MG/1
200 TABLET ORAL DAILY
Status: DISCONTINUED | OUTPATIENT
Start: 2022-11-05 | End: 2022-11-06 | Stop reason: HOSPADM

## 2022-11-05 RX ORDER — PANTOPRAZOLE SODIUM 40 MG/1
40 TABLET, DELAYED RELEASE ORAL
Status: DISCONTINUED | OUTPATIENT
Start: 2022-11-05 | End: 2022-11-06 | Stop reason: HOSPADM

## 2022-11-05 RX ORDER — NICOTINE 21 MG/24HR
1 PATCH, TRANSDERMAL 24 HOURS TRANSDERMAL DAILY
Status: DISCONTINUED | OUTPATIENT
Start: 2022-11-05 | End: 2022-11-06 | Stop reason: HOSPADM

## 2022-11-05 RX ORDER — AMLODIPINE BESYLATE 5 MG/1
5 TABLET ORAL 2 TIMES DAILY
Status: DISCONTINUED | OUTPATIENT
Start: 2022-11-05 | End: 2022-11-06 | Stop reason: HOSPADM

## 2022-11-05 RX ORDER — HYDROMORPHONE HCL IN WATER/PF 6 MG/30 ML
0.2 PATIENT CONTROLLED ANALGESIA SYRINGE INTRAVENOUS ONCE
Status: DISCONTINUED | OUTPATIENT
Start: 2022-11-05 | End: 2022-11-05

## 2022-11-05 RX ORDER — ACETAMINOPHEN 325 MG/1
975 TABLET ORAL EVERY 6 HOURS PRN
Status: DISCONTINUED | OUTPATIENT
Start: 2022-11-05 | End: 2022-11-06 | Stop reason: HOSPADM

## 2022-11-05 RX ORDER — ASCORBIC ACID 500 MG
1000 TABLET ORAL DAILY
Status: DISCONTINUED | OUTPATIENT
Start: 2022-11-05 | End: 2022-11-06 | Stop reason: HOSPADM

## 2022-11-05 RX ORDER — FUROSEMIDE 10 MG/ML
40 INJECTION INTRAMUSCULAR; INTRAVENOUS DAILY
Status: DISCONTINUED | OUTPATIENT
Start: 2022-11-05 | End: 2022-11-06 | Stop reason: HOSPADM

## 2022-11-05 RX ORDER — FUROSEMIDE 10 MG/ML
40 INJECTION INTRAMUSCULAR; INTRAVENOUS ONCE
Status: COMPLETED | OUTPATIENT
Start: 2022-11-05 | End: 2022-11-05

## 2022-11-05 RX ORDER — HYDROMORPHONE HCL/PF 1 MG/ML
0.5 SYRINGE (ML) INJECTION EVERY 4 HOURS PRN
Status: DISCONTINUED | OUTPATIENT
Start: 2022-11-05 | End: 2022-11-06 | Stop reason: HOSPADM

## 2022-11-05 RX ORDER — ASPIRIN 325 MG
325 TABLET ORAL ONCE
Status: COMPLETED | OUTPATIENT
Start: 2022-11-05 | End: 2022-11-05

## 2022-11-05 RX ADMIN — NICOTINE 1 PATCH: 14 PATCH, EXTENDED RELEASE TRANSDERMAL at 08:43

## 2022-11-05 RX ADMIN — DICLOFENAC SODIUM TOPICAL GEL, 1%, 2 G: 10 GEL TOPICAL at 17:33

## 2022-11-05 RX ADMIN — FUROSEMIDE 40 MG: 10 INJECTION, SOLUTION INTRAMUSCULAR; INTRAVENOUS at 01:25

## 2022-11-05 RX ADMIN — AMLODIPINE BESYLATE 5 MG: 5 TABLET ORAL at 17:33

## 2022-11-05 RX ADMIN — DICLOFENAC SODIUM TOPICAL GEL, 1%, 2 G: 10 GEL TOPICAL at 08:43

## 2022-11-05 RX ADMIN — DICLOFENAC SODIUM TOPICAL GEL, 1%, 2 G: 10 GEL TOPICAL at 21:23

## 2022-11-05 RX ADMIN — PANTOPRAZOLE SODIUM 40 MG: 40 TABLET, DELAYED RELEASE ORAL at 05:26

## 2022-11-05 RX ADMIN — ASPIRIN 325 MG ORAL TABLET 325 MG: 325 PILL ORAL at 01:52

## 2022-11-05 RX ADMIN — HYDROMORPHONE HYDROCHLORIDE 0.5 MG: 1 INJECTION, SOLUTION INTRAMUSCULAR; INTRAVENOUS; SUBCUTANEOUS at 05:30

## 2022-11-05 RX ADMIN — AMLODIPINE BESYLATE 5 MG: 5 TABLET ORAL at 08:42

## 2022-11-05 RX ADMIN — OXYCODONE HYDROCHLORIDE 10 MG: 10 TABLET ORAL at 10:58

## 2022-11-05 RX ADMIN — METOPROLOL SUCCINATE 150 MG: 100 TABLET, EXTENDED RELEASE ORAL at 20:03

## 2022-11-05 RX ADMIN — METOPROLOL SUCCINATE 150 MG: 100 TABLET, EXTENDED RELEASE ORAL at 08:42

## 2022-11-05 RX ADMIN — HYDROMORPHONE HYDROCHLORIDE 0.5 MG: 1 INJECTION, SOLUTION INTRAMUSCULAR; INTRAVENOUS; SUBCUTANEOUS at 00:50

## 2022-11-05 RX ADMIN — NITROGLYCERIN 1 INCH: 20 OINTMENT TOPICAL at 01:24

## 2022-11-05 RX ADMIN — AMIODARONE HYDROCHLORIDE 200 MG: 200 TABLET ORAL at 08:42

## 2022-11-05 RX ADMIN — OXYCODONE HYDROCHLORIDE 10 MG: 10 TABLET ORAL at 03:48

## 2022-11-05 RX ADMIN — RIVAROXABAN 15 MG: 15 TABLET, FILM COATED ORAL at 17:33

## 2022-11-05 RX ADMIN — DOXAZOSIN 2 MG: 1 TABLET ORAL at 21:22

## 2022-11-05 RX ADMIN — OXYCODONE HYDROCHLORIDE 10 MG: 10 TABLET ORAL at 17:32

## 2022-11-05 RX ADMIN — OXYCODONE HYDROCHLORIDE 10 MG: 10 TABLET ORAL at 23:18

## 2022-11-05 RX ADMIN — HYDROMORPHONE HYDROCHLORIDE 0.5 MG: 1 INJECTION, SOLUTION INTRAMUSCULAR; INTRAVENOUS; SUBCUTANEOUS at 13:37

## 2022-11-05 RX ADMIN — OXYCODONE HYDROCHLORIDE AND ACETAMINOPHEN 1000 MG: 500 TABLET ORAL at 08:42

## 2022-11-05 RX ADMIN — FUROSEMIDE 40 MG: 10 INJECTION, SOLUTION INTRAMUSCULAR; INTRAVENOUS at 13:29

## 2022-11-05 RX ADMIN — DICLOFENAC SODIUM TOPICAL GEL, 1%, 2 G: 10 GEL TOPICAL at 11:01
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
## 2022-11-05 NOTE — ASSESSMENT & PLAN NOTE
· POA: Patient with h/o uncontrolled HTN and markedly elevated BP on presentation 190s/90s  Improved to 150s SBP without treatment  Plan:  · Continue amlodipine 5 mg b i d   · Continue  metoprolol succinate 150 mg b i d   · Continue doxazosin 2 mg    · Monitor blood pressure at home  · Follow-up with PCP

## 2022-11-05 NOTE — ASSESSMENT & PLAN NOTE
· POA: 1 week h/o low back pain radiates down left thigh and extending to back of neck  Reproducible with tenderness on palpation of paraspinal muscles  Likely musculoskeletal     Plan:  · P r n  OTC pain medications  · Continue home topical diclofenac  · Follow-up with PCP

## 2022-11-05 NOTE — ASSESSMENT & PLAN NOTE
Lab Results   Component Value Date    EGFR 30 11/05/2022    EGFR 31 11/05/2022    EGFR 23 10/14/2022    CREATININE 1 80 (H) 11/05/2022    CREATININE 1 78 (H) 11/05/2022    CREATININE 2 24 (H) 10/14/2022     · Chronic, stable, POA: Creatinine at baseline on admission (baseline 1 7 - 2 since 2022)     · Likely secondary to uncontrolled HTN    Plan:  · Avoid nephrotoxic agents such as NSAIDs  · Follow-up with PCP

## 2022-11-05 NOTE — ASSESSMENT & PLAN NOTE
Wt Readings from Last 3 Encounters:   11/05/22 104 kg (229 lb 3 2 oz)   10/13/22 101 kg (223 lb 1 7 oz)   10/09/22 101 kg (222 lb)       Intake/Output Summary (Last 24 hours) at 11/5/2022 1025  Last data filed at 11/5/2022 0353  Gross per 24 hour   Intake 240 ml   Output 1550 ml   Net -1310 ml     · POA: 3 days of increasing exertional dyspnea requiring 4 L NC and chest x-ray showing pulmonary vascular congestion  Maintained on Lasix 20 mg daily at home  B/l lower extremity 1+ pitting edema noted on exam   · Last Echo 7/2022 showed normal systolic function with EF 55% and unable to assess diastolic function  1/2022 Echo showed normal systolic function with EF 55% and G2DD  · Patient reports adherent to treatment  She does have prior admissions for acute on chronic CHF exacerbation  May be secondary to uncontrolled HTN given significantly elevated BP on presentation  · Received Lasix 40 mg IV     Plan:  Continue Lasix 40 mg oral daily  Low-sodium diet  Fluid restriction 1 5 L  Monitor weight at home  Follow-up with PCP  Follow-up with cardiology
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
## 2022-11-05 NOTE — ED ATTENDING ATTESTATION
11/4/2022  IHenrry MD, saw and evaluated the patient  I have discussed the patient with the resident/non-physician practitioner and agree with the resident's/non-physician practitioner's findings, Plan of Care, and MDM as documented in the resident's/non-physician practitioner's note, except where noted  All available labs and Radiology studies were reviewed  I was present for key portions of any procedure(s) performed by the resident/non-physician practitioner and I was immediately available to provide assistance  At this point I agree with the current assessment done in the Emergency Department    I have conducted an independent evaluation of this patient a history and physical is as follows:    ED Course         Critical Care Time  Procedures

## 2022-11-05 NOTE — PLAN OF CARE
Problem: PAIN - ADULT  Goal: Verbalizes/displays adequate comfort level or baseline comfort level  Description: Interventions:  - Encourage patient to monitor pain and request assistance  - Assess pain using appropriate pain scale  - Administer analgesics based on type and severity of pain and evaluate response  - Implement non-pharmacological measures as appropriate and evaluate response  - Consider cultural and social influences on pain and pain management  - Notify physician/advanced practitioner if interventions unsuccessful or patient reports new pain  Outcome: Progressing     Problem: INFECTION - ADULT  Goal: Absence or prevention of progression during hospitalization  Description: INTERVENTIONS:  - Assess and monitor for signs and symptoms of infection  - Monitor lab/diagnostic results  - Monitor all insertion sites, i e  indwelling lines, tubes, and drains  - Monitor endotracheal if appropriate and nasal secretions for changes in amount and color  - [place] appropriate cooling/warming therapies per order  - Administer medications as ordered  - Instruct and encourage patient and family to use good hand hygiene technique  - Identify and instruct in appropriate isolation precautions for identified infection/condition  Outcome: Progressing  Goal: Absence of fever/infection during neutropenic period  Description: INTERVENTIONS:  - Monitor WBC    Outcome: Progressing     Problem: SAFETY ADULT  Goal: Patient will remain free of falls  Description: INTERVENTIONS:  - Educate patient/family on patient safety including physical limitations  - Instruct patient to call for assistance with activity   - Consult OT/PT to assist with strengthening/mobility   - Keep Call bell within reach  - Keep bed low and locked with side rails adjusted as appropriate  - Keep care items and personal belongings within reach  - Initiate and maintain comfort rounds  - Make Fall Risk Sign visible to staff  - Offer Toileting every  Hours, in advance of need  - Initiate/Maintain alarm  - Obtain necessary fall risk management equipment:   - Apply yellow socks and bracelet for high fall risk patients  - Consider moving patient to room near nurses station  Outcome: Progressing  Goal: Maintain or return to baseline ADL function  Description: INTERVENTIONS:  -  Assess patient's ability to carry out ADLs; assess patient's baseline for ADL function and identify physical deficits which impact ability to perform ADLs (bathing, care of mouth/teeth, toileting, grooming, dressing, etc )  - Assess/evaluate cause of self-care deficits   - Assess range of motion  - Assess patient's mobility; develop plan if impaired  - Assess patient's need for assistive devices and provide as appropriate  - Encourage maximum independence but intervene and supervise when necessary  - Involve family in performance of ADLs  - Assess for home care needs following discharge   - Consider OT consult to assist with ADL evaluation and planning for discharge  - Provide patient education as appropriate  Outcome: Progressing  Goal: Maintains/Returns to pre admission functional level  Description: INTERVENTIONS:  - Perform BMAT or MOVE assessment daily    - Set and communicate daily mobility goal to care team and patient/family/caregiver  - Collaborate with rehabilitation services on mobility goals if consulted  - Perform Range of Motion  times a day  - Reposition patient every  hours    - Dangle patient  times a day  - Stand patient  times a day  - Ambulate patient  times a day  - Out of bed to chair  times a day   - Out of bed for meals times a day  - Out of bed for toileting  - Record patient progress and toleration of activity level   Outcome: Progressing     Problem: DISCHARGE PLANNING  Goal: Discharge to home or other facility with appropriate resources  Description: INTERVENTIONS:  - Identify barriers to discharge w/patient and caregiver  - Arrange for needed discharge resources and transportation as appropriate  - Identify discharge learning needs (meds, wound care, etc )  - Arrange for interpretive services to assist at discharge as needed  - Refer to Case Management Department for coordinating discharge planning if the patient needs post-hospital services based on physician/advanced practitioner order or complex needs related to functional status, cognitive ability, or social support system  Outcome: Progressing     Problem: Knowledge Deficit  Goal: Patient/family/caregiver demonstrates understanding of disease process, treatment plan, medications, and discharge instructions  Description: Complete learning assessment and assess knowledge base    Interventions:  - Provide teaching at level of understanding  - Provide teaching via preferred learning methods  Outcome: Progressing

## 2022-11-05 NOTE — ASSESSMENT & PLAN NOTE
· Patient has a h/o A flutter on amiodarone, metoprolol succinate, and rivaroxaban for anticoagulation therapy as outpatient  ECG on admission showed NSR with inverted T-waves in V4-V6       Plan:  · Continue home regimen  · Follow-up with cardiology/PCP

## 2022-11-05 NOTE — ASSESSMENT & PLAN NOTE
· Patient reports 3 days of sharp intermittent left-sided chest pain radiating to left arm, each episode lasting 1-2 minutes  Denies any alleviating or exacerbating factors  Initial troponin 50 with 2hr delta of 3  ECG on admission showed V4 to V6 inversions compared to prior tracing  · Suspect chest pain likely in the setting of acute on chronic CHF with pulmonary vascular congestion +/- uncontrolled HTN and ACS unlikely  · Received IV Lasix, nitro paste, and aspirin in ED       Plan:  Follow-up with PCP

## 2022-11-05 NOTE — ASSESSMENT & PLAN NOTE
· POA:  Patient presented with 3 day h/o worsening exertional dyspnea which she attributed to neck pain, O2 sat 88% on room air on presentation improved with 4 L O2 via NC  Chest x-ray showed pulmonary congestion  · Likely secondary to acute CHF exacerbation and hypertensive crisis with significantly elevated BP may also be contributing to pulmonary edema  · Patient is currently on room air  Saturation >90%    Plan  Follow-up with PCP

## 2022-11-05 NOTE — PLAN OF CARE
Problem: PAIN - ADULT  Goal: Verbalizes/displays adequate comfort level or baseline comfort level  Description: Interventions:  - Encourage patient to monitor pain and request assistance  - Assess pain using appropriate pain scale  - Administer analgesics based on type and severity of pain and evaluate response  - Implement non-pharmacological measures as appropriate and evaluate response  - Consider cultural and social influences on pain and pain management  - Notify physician/advanced practitioner if interventions unsuccessful or patient reports new pain  Outcome: Progressing     Problem: INFECTION - ADULT  Goal: Absence or prevention of progression during hospitalization  Description: INTERVENTIONS:  - Assess and monitor for signs and symptoms of infection  - Monitor lab/diagnostic results  - Monitor all insertion sites, i e  indwelling lines, tubes, and drains  - Monitor endotracheal if appropriate and nasal secretions for changes in amount and color  - [place] appropriate cooling/warming therapies per order  - Administer medications as ordered  - Instruct and encourage patient and family to use good hand hygiene technique  - Identify and instruct in appropriate isolation precautions for identified infection/condition  Outcome: Progressing  Goal: Absence of fever/infection during neutropenic period  Description: INTERVENTIONS:  - Monitor WBC    Outcome: Progressing     Problem: SAFETY ADULT  Goal: Patient will remain free of falls  Description: INTERVENTIONS:  - Educate patient/family on patient safety including physical limitations  - Instruct patient to call for assistance with activity   - Consult OT/PT to assist with strengthening/mobility   - Keep Call bell within reach  - Keep bed low and locked with side rails adjusted as appropriate  - Keep care items and personal belongings within reach  - Initiate and maintain comfort rounds  - Make Fall Risk Sign visible to staff  - Obtain necessary fall risk management equipment  - Apply yellow socks and bracelet for high fall risk patients  - Consider moving patient to room near nurses station  Outcome: Progressing  Goal: Maintain or return to baseline ADL function  Description: INTERVENTIONS:  -  Assess patient's ability to carry out ADLs; assess patient's baseline for ADL function and identify physical deficits which impact ability to perform ADLs (bathing, care of mouth/teeth, toileting, grooming, dressing, etc )  - Assess/evaluate cause of self-care deficits   - Assess range of motion  - Assess patient's mobility; develop plan if impaired  - Assess patient's need for assistive devices and provide as appropriate  - Encourage maximum independence but intervene and supervise when necessary  - Involve family in performance of ADLs  - Assess for home care needs following discharge   - Consider OT consult to assist with ADL evaluation and planning for discharge  - Provide patient education as appropriate  Outcome: Progressing  Goal: Maintains/Returns to pre admission functional level  Description: INTERVENTIONS:  - Perform BMAT or MOVE assessment daily    - Set and communicate daily mobility goal to care team and patient/family/caregiver     - Collaborate with rehabilitation services on mobility goals if consulted  - Ambulate patient 2 times a day  - Out of bed for meals 3 times a day  - Out of bed for toileting  - Record patient progress and toleration of activity level   Outcome: Progressing     Problem: DISCHARGE PLANNING  Goal: Discharge to home or other facility with appropriate resources  Description: INTERVENTIONS:  - Identify barriers to discharge w/patient and caregiver  - Arrange for needed discharge resources and transportation as appropriate  - Identify discharge learning needs (meds, wound care, etc )  - Arrange for interpretive services to assist at discharge as needed  - Refer to Case Management Department for coordinating discharge planning if the patient needs post-hospital services based on physician/advanced practitioner order or complex needs related to functional status, cognitive ability, or social support system  Outcome: Progressing     Problem: Knowledge Deficit  Goal: Patient/family/caregiver demonstrates understanding of disease process, treatment plan, medications, and discharge instructions  Description: Complete learning assessment and assess knowledge base    Interventions:  - Provide teaching at level of understanding  - Provide teaching via preferred learning methods  Outcome: Progressing     Problem: Potential for Falls  Goal: Patient will remain free of falls  Description: INTERVENTIONS:  - Educate patient/family on patient safety including physical limitations  - Instruct patient to call for assistance with activity   - Consult OT/PT to assist with strengthening/mobility   - Keep Call bell within reach  - Keep bed low and locked with side rails adjusted as appropriate  - Keep care items and personal belongings within reach  - Initiate and maintain comfort rounds  - Make Fall Risk Sign visible to staff  - Obtain necessary fall risk management equipment  - Apply yellow socks and bracelet for high fall risk patients  - Consider moving patient to room near nurses station  Outcome: Progressing

## 2022-11-06 VITALS
SYSTOLIC BLOOD PRESSURE: 125 MMHG | DIASTOLIC BLOOD PRESSURE: 76 MMHG | OXYGEN SATURATION: 92 % | BODY MASS INDEX: 35.25 KG/M2 | RESPIRATION RATE: 18 BRPM | TEMPERATURE: 98.5 F | HEART RATE: 60 BPM | HEIGHT: 67 IN | WEIGHT: 224.6 LBS

## 2022-11-06 PROBLEM — J96.01 ACUTE RESPIRATORY FAILURE WITH HYPOXIA (HCC): Status: RESOLVED | Noted: 2022-11-05 | Resolved: 2022-11-06

## 2022-11-06 LAB
ANION GAP SERPL CALCULATED.3IONS-SCNC: 5 MMOL/L (ref 4–13)
ATRIAL RATE: 60 BPM
BUN SERPL-MCNC: 24 MG/DL (ref 5–25)
CALCIUM SERPL-MCNC: 8.7 MG/DL (ref 8.4–10.2)
CHLORIDE SERPL-SCNC: 104 MMOL/L (ref 96–108)
CO2 SERPL-SCNC: 31 MMOL/L (ref 21–32)
CREAT SERPL-MCNC: 1.79 MG/DL (ref 0.6–1.3)
ERYTHROCYTE [DISTWIDTH] IN BLOOD BY AUTOMATED COUNT: 14.2 % (ref 11.6–15.1)
GFR SERPL CREATININE-BSD FRML MDRD: 31 ML/MIN/1.73SQ M
GLUCOSE SERPL-MCNC: 92 MG/DL (ref 65–140)
HCT VFR BLD AUTO: 31.9 % (ref 34.8–46.1)
HGB BLD-MCNC: 9.8 G/DL (ref 11.5–15.4)
MCH RBC QN AUTO: 27.1 PG (ref 26.8–34.3)
MCHC RBC AUTO-ENTMCNC: 30.7 G/DL (ref 31.4–37.4)
MCV RBC AUTO: 88 FL (ref 82–98)
P AXIS: 77 DEGREES
PLATELET # BLD AUTO: 170 THOUSANDS/UL (ref 149–390)
PMV BLD AUTO: 10.9 FL (ref 8.9–12.7)
POTASSIUM SERPL-SCNC: 4.1 MMOL/L (ref 3.5–5.3)
PR INTERVAL: 164 MS
QRS AXIS: -57 DEGREES
QRSD INTERVAL: 168 MS
QT INTERVAL: 482 MS
QTC INTERVAL: 482 MS
RBC # BLD AUTO: 3.62 MILLION/UL (ref 3.81–5.12)
SODIUM SERPL-SCNC: 140 MMOL/L (ref 135–147)
T WAVE AXIS: 106 DEGREES
VENTRICULAR RATE: 60 BPM
WBC # BLD AUTO: 3.46 THOUSAND/UL (ref 4.31–10.16)

## 2022-11-06 RX ORDER — FUROSEMIDE 40 MG/1
40 TABLET ORAL DAILY
Qty: 30 TABLET | Refills: 0 | Status: SHIPPED | OUTPATIENT
Start: 2022-11-06 | End: 2022-12-06

## 2022-11-06 RX ADMIN — FUROSEMIDE 40 MG: 10 INJECTION, SOLUTION INTRAMUSCULAR; INTRAVENOUS at 09:13

## 2022-11-06 RX ADMIN — METOPROLOL SUCCINATE 150 MG: 100 TABLET, EXTENDED RELEASE ORAL at 09:12

## 2022-11-06 RX ADMIN — DICLOFENAC SODIUM TOPICAL GEL, 1%, 2 G: 10 GEL TOPICAL at 09:13

## 2022-11-06 RX ADMIN — OXYCODONE HYDROCHLORIDE AND ACETAMINOPHEN 1000 MG: 500 TABLET ORAL at 09:12

## 2022-11-06 RX ADMIN — LIDOCAINE 5% 2 PATCH: 700 PATCH TOPICAL at 09:13

## 2022-11-06 RX ADMIN — AMLODIPINE BESYLATE 5 MG: 5 TABLET ORAL at 09:12

## 2022-11-06 RX ADMIN — AMIODARONE HYDROCHLORIDE 200 MG: 200 TABLET ORAL at 09:12

## 2022-11-06 RX ADMIN — DICLOFENAC SODIUM TOPICAL GEL, 1%, 2 G: 10 GEL TOPICAL at 13:39

## 2022-11-06 RX ADMIN — PANTOPRAZOLE SODIUM 40 MG: 40 TABLET, DELAYED RELEASE ORAL at 05:38

## 2022-11-06 RX ADMIN — NICOTINE 1 PATCH: 14 PATCH, EXTENDED RELEASE TRANSDERMAL at 09:12

## 2022-11-06 RX ADMIN — OXYCODONE HYDROCHLORIDE 10 MG: 10 TABLET ORAL at 09:15

## 2022-11-06 RX ADMIN — HYDROMORPHONE HYDROCHLORIDE 0.5 MG: 1 INJECTION, SOLUTION INTRAMUSCULAR; INTRAVENOUS; SUBCUTANEOUS at 05:38

## 2022-11-06 NOTE — DISCHARGE SUMMARY
[facility]  Discharge- Shannon Watkins 1965, 62 y o  female MRN: [id number]  Unit/Bed#: S -01 Encounter: [id number]  Primary Care Provider: Helen Garcia MD   Date and time admitted to hospital: 11/4/2022 11:34 PM    * Acute on chronic heart failure with preserved ejection fraction [facility])  Assessment & Plan  Wt Readings from Last 3 Encounters:   11/05/22 104 kg (229 lb 3 2 oz)   10/13/22 101 kg (223 lb 1 7 oz)   10/09/22 101 kg (222 lb)       Intake/Output Summary (Last 24 hours) at 11/5/2022 1025  Last data filed at 11/5/2022 0353  Gross per 24 hour   Intake 240 ml   Output 1550 ml   Net -1310 ml     · POA: 3 days of increasing exertional dyspnea requiring 4 L NC and chest x-ray showing pulmonary vascular congestion  Maintained on Lasix 20 mg daily at home  B/l lower extremity 1+ pitting edema noted on exam   · Last Echo 7/2022 showed normal systolic function with EF 55% and unable to assess diastolic function  1/2022 Echo showed normal systolic function with EF 55% and G2DD  · Patient reports adherent to treatment  She does have prior admissions for acute on chronic CHF exacerbation  May be secondary to uncontrolled HTN given significantly elevated BP on presentation  · Received Lasix 40 mg IV     Plan:  Continue Lasix 40 mg oral daily  Low-sodium diet  Fluid restriction 1 5 L  Monitor weight at home  Follow-up with PCP  Follow-up with cardiology  Acute respiratory failure with hypoxia (HCC)-resolved as of 11/6/2022  Assessment & Plan  · POA:  Patient presented with 3 day h/o worsening exertional dyspnea which she attributed to neck pain, O2 sat 88% on room air on presentation improved with 4 L O2 via NC  Chest x-ray showed pulmonary congestion  · Likely secondary to acute CHF exacerbation and hypertensive crisis with significantly elevated BP may also be contributing to pulmonary edema  · Patient is currently on room air  Saturation >90%    Plan  Follow-up with PCP    Chest pain  Assessment & Plan  · Patient reports 3 days of sharp intermittent left-sided chest pain radiating to left arm, each episode lasting 1-2 minutes  Denies any alleviating or exacerbating factors  Initial troponin 50 with 2hr delta of 3  ECG on admission showed V4 to V6 inversions compared to prior tracing  · Suspect chest pain likely in the setting of acute on chronic CHF with pulmonary vascular congestion +/- uncontrolled HTN and ACS unlikely  · Received IV Lasix, nitro paste, and aspirin in ED  Plan:  Follow-up with PCP      Hypertension, uncontrolled  Assessment & Plan  · POA: Patient with h/o uncontrolled HTN and markedly elevated BP on presentation 190s/90s  Improved to 150s SBP without treatment  Plan:  · Continue amlodipine 5 mg b i d   · Continue  metoprolol succinate 150 mg b i d   · Continue doxazosin 2 mg  · Monitor blood pressure at home  · Follow-up with PCP    Left low back pain  Assessment & Plan  · POA: 1 week h/o low back pain radiates down left thigh and extending to back of neck  Reproducible with tenderness on palpation of paraspinal muscles  Likely musculoskeletal     Plan:  · P r n  OTC pain medications  · Continue home topical diclofenac  · Follow-up with PCP  Atypical atrial flutter ([facility] [address] )  Assessment & Plan  · Patient has a h/o A flutter on amiodarone, metoprolol succinate, and rivaroxaban for anticoagulation therapy as outpatient  ECG on admission showed NSR with inverted T-waves in V4-V6  Plan:  · Continue home regimen  · Follow-up with cardiology/PCP    CKD (chronic kidney disease) stage 3, GFR 30-59 ml/min [facility])  Assessment & Plan  Lab Results   Component Value Date    EGFR 30 11/05/2022    EGFR 31 11/05/2022    EGFR 23 10/14/2022    CREATININE 1 80 (H) 11/05/2022    CREATININE 1 78 (H) 11/05/2022    CREATININE 2 24 (H) 10/14/2022     · Chronic, stable, POA: Creatinine at baseline on admission (baseline 1 7 - 2 since 2022)     · Likely secondary to uncontrolled HTN    Plan:  · Avoid nephrotoxic agents such as NSAIDs  · Follow-up with PCP    Gastroesophageal reflux disease without esophagitis  Assessment & Plan  · Continue pantoprazole 40 mg daily  · Follow-up with PCP        Medical Problems             Resolved Problems  Date Reviewed: 11/6/2022          Resolved    Acute respiratory failure with hypoxia ([address] ) 11/6/2022     Resolved by  Wendi Donohue MD              Discharging Resident: Keyona Sal [address] [place] [address]  Discharging Attending: Amanuel Vanegas MD  PCP: Abi Kline MD  Admission Date:   Admission Orders (From admission, onward)     Ordered        11/05/22 0201  INPATIENT ADMISSION  Once                      Discharge Date: 11/06/22    Consultations During Hospital Stay:  · None    Procedures Performed:   · None    Significant Findings / Test Results:   · BNP elevated  940  · Chest x-ray:  Pulmonary vascular congestion  · Creatinine elevated  1 79    Incidental Findings:   · None    Test Results Pending at Discharge (will require follow up): · None     Outpatient Tests Requested:  · BMP in 1 week  Complications:  None    Reason for Admission:  Excercebation of heart failure    Hospital Course:   Bryon Ramsey is a 62 y o  female patient who originally presented to the hospital on 11/4/2022 due to acute exacerbation of heart failure  Also, patient had acute respiratory failure with hypoxia on admission  Required 4 L of oxygen via nasal cannula  Patient's BNP was elevated  Chest x-ray showed pulmonary vascular congestion  Patient received IV Lasix 40 mg  Patient's symptoms improved following diuresis  Patient is currently on room air  Sating well  Vital stable  Creatinine at baseline  Electrolytes normal    Patient had elevated blood pressure on admission  190/90  Blood pressure is within normal range on discharge      The patient, initially admitted to the hospital as inpatient, was discharged earlier than expected given the following: Improvement of acute heart failure  Please see above list of diagnoses and related plan for additional information  Condition at Discharge: good    Discharge Day Visit / Exam:   Subjective:  Patient feels better  She is off oxygen  Satting well on room air  No SOB, chest pain or palpitations  Lower limb swelling has improved  Patient still has left-sided lower back pain  Pain occurs only with ambulation  No pain at rest   No bladder or bowel incontinence  No saddle anaesthesia  No fever  No lower extremity weakness or paresthesia  No tenderness over lumbar spine  No fever  Vitals: Blood Pressure: 125/76 (11/06/22 0841)  Pulse: 60 (11/06/22 0902)  Temperature: 98 5 °F (36 9 °C) (11/06/22 0902)  Temp Source: Oral (11/05/22 2231)  Respirations: 18 (11/06/22 0841)  Height: 5' 7" (170 2 cm) (11/04/22 2243)  Weight - Scale: 102 kg (224 lb 9 6 oz) (11/06/22 0537)  SpO2: 92 % (11/06/22 0902)  Exam:   Physical Exam  Constitutional:       General: She is not in acute distress  Appearance: Normal appearance  She is not ill-appearing  HENT:      Head: Normocephalic  Mouth/Throat:      Mouth: Mucous membranes are moist       Pharynx: Oropharynx is clear  Eyes:      Conjunctiva/sclera: Conjunctivae normal    Cardiovascular:      Rate and Rhythm: Normal rate  Pulses: Normal pulses  Heart sounds: Normal heart sounds  Pulmonary:      Effort: Pulmonary effort is normal       Breath sounds: Normal breath sounds  Abdominal:      General: Bowel sounds are normal       Palpations: Abdomen is soft  Skin:     General: Skin is warm and dry  Capillary Refill: Capillary refill takes less than 2 seconds  Neurological:      Mental Status: She is alert and oriented to person, place, and time     Psychiatric:         Mood and Affect: Mood normal          Behavior: Behavior normal           Discharge instructions/Information to patient and family:   See after visit summary for information provided to patient and family  Provisions for Follow-Up Care:  See after visit summary for information related to follow-up care and any pertinent home health orders  Disposition:   Home    Planned Readmission: no     Discharge Medications:  See after visit summary for reconciled discharge medications provided to patient and/or family        **Please Note: This note may have been constructed using a voice recognition system**

## 2022-11-06 NOTE — UTILIZATION REVIEW
Initial Clinical Review    Admission: Date/Time/Statement:   Admission Orders (From admission, onward)     Ordered        11/05/22 0201  INPATIENT ADMISSION  Once                      Orders Placed This Encounter   Procedures   • INPATIENT ADMISSION     Standing Status:   Standing     Number of Occurrences:   1     Order Specific Question:   Level of Care     Answer:   Med Surg [16]     Order Specific Question:   Estimated length of stay     Answer:   More than 2 Midnights     Order Specific Question:   Certification     Answer:   I certify that inpatient services are medically necessary for this patient for a duration of greater than two midnights  See H&P and MD Progress Notes for additional information about the patient's course of treatment  ED Arrival Information     Expected   -    Arrival   11/4/2022 22:31    Acuity   Urgent            Means of arrival   Walk-In    Escorted by   Family Member    Service   Hospitalist    Admission type   Emergency            Arrival complaint   SOB/Back Pain           Chief Complaint   Patient presents with   • Shortness of Breath     Patient states back pain with walking, jaw pain, and chest pain  Initial Presentation: 62 y o  female with a PMH of combined systolic and diastolic CHF, A flutter on Rivaroxaban, amiodarone and Toprol XL, GERD, HTN, CKD, and h/o cocaine use who presents with 1 week h/o worsening left low back pain radiating down her left thigh  She reports low back pain progressively worsened over the past week extending to her neck 1 day ago  She reports that both back and neck pain are constant dull and achy in quality, 8/10 in severity at rest, and worse with movement  She also reports new onset of sharp left-sided chest pain radiating down her left arm that comes and goes and increasing exertional dyspnea x 3 day prompting her to to come into the ED for further evaluation  O2 sat 88% on room air on presentation improved with 4 L O2 via NC   Plan: Inpatient admission for evaluation and treatment of acute on chronic CHF, acute resp failure with hypoxia, chest pain, A Flutter, HTN: telemetry, IV lasix 40 mg bid, wean O2 as able to maintain O2 sat > 88%, continue home metoprolol, rivaroxaban, amlodipine  Date: 11/6   Day 2:     Internal medicine: Pt was able to wean to RA  Continue po lasix 40 mg daily, low sodium diet with 1500 ml fluid restriction, continue amlodipine, metoprolol, doxazosin       ED Triage Vitals   Temperature Pulse Respirations Blood Pressure SpO2   11/04/22 2243 11/04/22 2243 11/04/22 2243 11/04/22 2243 11/04/22 2243   98 4 °F (36 9 °C) 60 18 (!) 195/98 99 %      Temp Source Heart Rate Source Patient Position - Orthostatic VS BP Location FiO2 (%)   11/04/22 2243 11/04/22 2243 11/04/22 2243 11/04/22 2243 --   Oral Monitor Sitting Right arm       Pain Score       11/05/22 0050       10 - Worst Possible Pain          Wt Readings from Last 1 Encounters:   11/06/22 102 kg (224 lb 9 6 oz)     Additional Vital Signs:     Date/Time Temp Pulse Resp BP MAP (mmHg) SpO2 Calculated FIO2 (%) - Nasal Cannula Nasal Cannula O2 Flow Rate (L/min) O2 Device   11/06/22 09:02:23 98 5 °F (36 9 °C) 60 -- -- -- 92 % -- -- --   11/06/22 08:41:26 -- 60 18 125/76 92 93 % -- -- --   11/05/22 22:31:46 98 1 °F (36 7 °C) 60 18 177/85 Abnormal  116 91 % -- -- --   11/05/22 20:06:13 -- 60 -- 159/74 102 92 % -- -- --   11/05/22 2003 -- 61 -- 159/74 -- -- -- -- --   11/05/22 17:00:03 98 9 °F (37 2 °C) 58 18 158/79 105 94 % -- -- --   11/05/22 1613 -- -- -- -- -- -- 24 1 L/min Nasal cannula   11/05/22 0844 -- -- -- -- -- 93 % 28 2 L/min Nasal cannula   11/05/22 07:35:38 99 °F (37 2 °C) 60 18 158/87 111 93 % -- -- --   11/05/22 0527 -- -- -- -- -- 96 % 28 2 L/min Nasal cannula   11/05/22 02:58:11 98 2 °F (36 8 °C) 60 22 148/90 109 97 % 32 3 L/min Nasal cannula   11/05/22 0156 -- 60 20 180/92 Abnormal  -- 96 % 36 4 L/min Nasal cannula   11/05/22 0115 -- 60 20 178/98 Abnormal  -- 94 % 36 4 L/min Nasal cannula   11/05/22 0104 -- -- -- -- -- 92 % 36 4 L/min Nasal cannula   11/05/22 0018 -- -- -- -- -- -- -- -- Nasal cannula   11/05/22 0009 -- -- -- -- -- 92 % -- -- --     Pertinent Labs/Diagnostic Test Results:   XR chest 1 view portable   Final Result by Maida Vargas MD (11/05 0735)      Moderate pulmonary venous congestion  Nothing to suggest pneumonia                    Workstation performed: [id number]           Results from last 7 days   Lab Units 11/05/22  1101   SARS-COV-2  Negative     Results from last 7 days   Lab Units 11/06/22 0519 11/05/22 0100 11/05/22 0048   WBC Thousand/uL 3 46* 4 95 6 76   HEMOGLOBIN g/dL 9 8* 8 6* 9 2*   HEMATOCRIT % 31 9* 28 1* 29 8*   PLATELETS Thousands/uL 170 141* 161   NEUTROS ABS Thousands/µL  --   --  5 03         Results from last 7 days   Lab Units 11/06/22 0519 11/05/22 0100 11/05/22  0048   SODIUM mmol/L 140 140 139   POTASSIUM mmol/L 4 1 3 8 4 3   CHLORIDE mmol/L 104 107 108   CO2 mmol/L 31 28 26   ANION GAP mmol/L 5 5 5   BUN mg/dL 24 26* 27*   CREATININE mg/dL 1 79* 1 80* 1 78*   EGFR ml/min/1 73sq m 31 30 31   CALCIUM mg/dL 8 7 8 5 8 9     Results from last 7 days   Lab Units 11/05/22  0048   AST U/L 17   ALT U/L 15   ALK PHOS U/L 61   TOTAL PROTEIN g/dL 7 1   ALBUMIN g/dL 3 9   TOTAL BILIRUBIN mg/dL 0 55         Results from last 7 days   Lab Units 11/06/22 0519 11/05/22 0100 11/05/22  0048   GLUCOSE RANDOM mg/dL 92 110 97         Results from last 7 days   Lab Units 11/05/22 0527 11/05/22  0305 11/05/22  0048   HS TNI 0HR ng/L  --   --  47   HS TNI 2HR ng/L  --  50*  --    HSTNI D2 ng/L  --  3  --    HS TNI 4HR ng/L 55*  --   --    HSTNI D4 ng/L 8  --   --      Results from last 7 days   Lab Units 11/05/22 0048   D-DIMER QUANTITATIVE ug/ml FEU 0 50*     Results from last 7 days   Lab Units 11/05/22 0048   PROTIME seconds 28 4*   INR  2 63*   PTT seconds 37             Results from last 7 days   Lab Units 11/05/22  0048   LACTIC ACID mmol/L 0 6             Results from last 7 days   Lab Units 11/05/22  0048   BNP pg/mL 940*         Results from last 7 days   Lab Units 11/05/22  0117   CLARITY UA  Clear   COLOR UA  Light Yellow   SPEC GRAV UA  1 015   PH UA  6 0   GLUCOSE UA mg/dl Negative   KETONES UA mg/dl Negative   BLOOD UA  Negative   PROTEIN UA mg/dl Trace*   NITRITE UA  Negative   BILIRUBIN UA  Negative   UROBILINOGEN UA (BE) mg/dl <2 0   LEUKOCYTES UA  Negative   WBC UA /hpf 1-2   RBC UA /hpf 1-2   BACTERIA UA /hpf None Seen   EPITHELIAL CELLS WET PREP /hpf Occasional     Results from last 7 days   Lab Units 11/05/22  1101   INFLUENZA A PCR  Negative   INFLUENZA B PCR  Negative   RSV PCR  Negative           Results from last 7 days   Lab Units 11/05/22  0048   BLOOD CULTURE  No Growth at 24 hrs  No Growth at 24 hrs           ED Treatment:   Medication Administration from 11/04/2022 2231 to 11/05/2022 0246       Date/Time Order Dose Route Action     11/05/2022 0050 HYDROmorphone (DILAUDID) injection 0 5 mg 0 5 mg Intravenous Given     11/05/2022 0125 furosemide (LASIX) injection 40 mg 40 mg Intravenous Given     11/05/2022 0124 nitroglycerin (NITRO-BID) 2 % TD ointment 1 inch 1 inch Topical Given     11/05/2022 0152 aspirin tablet 325 mg 325 mg Oral Given        Past Medical History:   Diagnosis Date   • ACS (acute coronary syndrome) ([facility] ) 2/7/2021   • Arrhythmia    • Arthritis    • Atrial fibrillation (HCC)    • [facility] response [facility] ) 3/20/2016   • Breast lump    • CKD (chronic kidney disease) stage 3, GFR 30-59 ml/min (HCC)    • Disease of thyroid gland    • 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 ([facility] ) 5/25/2020   • GERD (gastroesophageal reflux disease)    • H/O transfusion 1987   • Hepatitis C     resolved   • Hepatitis C    • Hyperlipidemia    • Hypertension    • Irregular heart beat    • Pacemaker    • Sleep apnea     no cpap   • Tachycardia Present on Admission:  • Acute respiratory failure with hypoxia (HCC)  • Acute on chronic heart failure with preserved ejection fraction [facility])  • Chest pain  • Left low back pain  • Atypical atrial flutter (HCC)  • CKD (chronic kidney disease) stage 3, GFR 30-59 ml/min ([facility])  • Hypertension, uncontrolled  • Gastroesophageal reflux disease without esophagitis      Admitting Diagnosis: Cough [R05 9]  Back pain [M54 9]  SOB (shortness of breath) [R06 02]  Hypoxia [R09 02]  Acute exacerbation of CHF (congestive heart failure) (HCC) [I50 9]  Chest pain, unspecified type [R07 9]  Age/Sex: 62 y o  female  Admission Orders:  Scheduled Medications:  amiodarone, 200 mg, Oral, Daily  amLODIPine, 5 mg, Oral, BID  ascorbic acid, 1,000 mg, Oral, Daily  Diclofenac Sodium, 2 g, Topical, 4x Daily  doxazosin, 2 mg, Oral, HS  furosemide, 40 mg, Intravenous, Daily  lidocaine, 2 patch, Topical, Daily  metoprolol succinate, 150 mg, Oral, BID  nicotine, 1 patch, Transdermal, Daily  pantoprazole, 40 mg, Oral, Early Morning  rivaroxaban, 15 mg, Oral, QPM      Continuous IV Infusions:     PRN Meds:  acetaminophen, 975 mg, Oral, Q6H PRN  HYDROmorphone, 0 5 mg, Intravenous, Q4H PRN  labetalol, 10 mg, Intravenous, Q4H PRN  oxyCODONE, 10 mg, Oral, Q6H PRN  oxyCODONE, 5 mg, Oral, Q6H PRN        None    Network Utilization Review Department  ATTENTION: Please call with any questions or concerns to [phone] and carefully listen to the prompts so that you are directed to the right person  All voicemails are confidential   Staci Wynn all requests for admission clinical reviews, approved or denied determinations and any other requests to dedicated fax number below belonging to the campus where the patient is receiving treatment   List of dedicated fax numbers for the Facilities:  FACILITY NAME UR FAX NUMBER   ADMISSION DENIALS (Administrative/Medical Necessity) [address] [facility] (Maternity/NICU/Pediatrics) [phone]   [facility] [address] [facility] [phone]   [facility] [phone]   [facility] [address] [place] [address] [facility] [address] [address] [phone]   [address] [facility] [facility] [place] [address] [facility] [phone]

## 2022-11-06 NOTE — UTILIZATION REVIEW
NOTIFICATION OF INPATIENT ADMISSION   AUTHORIZATION REQUEST   SERVICING FACILITY:   [address] [facility], [address] [place] [address]  Tax ID: [id number]  NPI: [id number]   ATTENDING PROVIDER:  Attending Name and NPI#: Danna Roa Md [[id number]]  Address: [address] [place] [address] [place]  [place], [address] [place] [address]  Phone: [phone]     ADMISSION INFORMATION:  Place of Service: Inpatient [facility]  [address]  Place of Service Code: 21  Inpatient Admission Date/Time: 11/5/22  2:01 AM  Discharge Date/Time: No discharge date for patient encounter  Admitting Diagnosis Code/Description:  Cough [R05 9]  Back pain [M54 9]  SOB (shortness of breath) [R06 02]  Hypoxia [R09 02]  Acute exacerbation of CHF (congestive heart failure) ([facility] ) [I50 9]  Chest pain, unspecified type [R07 9]     UTILIZATION REVIEW CONTACT:  Garfield Pool, Utilization   Network Utilization Review Department  Phone: [phone]  Fax: [phone]  Email: [email or web address]  Contact for approvals/pending authorizations, clinical reviews, and discharge  PHYSICIAN ADVISORY SERVICES:  Medical Necessity Denial & [id number] Review  Phone: [phone]  Fax: [phone]  Email: [email or web address]

## 2022-11-06 NOTE — DISCHARGE INSTR - AVS FIRST PAGE
Dear Shannon Watkins,     It was our pleasure to care for you here at [facility]  It is our hope that we were always able to exceed the expected standards for your care during your stay  You were hospitalized due to excerebration of heart failure  You were cared for on the 2nd floor by Nj John under the service of Danna Roa MD with the [facility] who covers for your primary care physician (PCP), Helen Garcia MD, while you were hospitalized  If you have any questions or concerns related to this hospitalization, you may contact us at [phone]  For follow up as well as any medication refills, we recommend that you follow up with your primary care physician  A registered nurse will reach out to you by phone within a few days after your discharge to answer any additional questions that you may have after going home  However, at this time we provide for you here, the most important instructions / recommendations at discharge:     Notable Medication Adjustments -   Start taking furosemide 40 mg once daily  Take other medications as ordered  Testing Required after Discharge -   BMP in 1 week  Important follow up information -   Follow-up with your PCP in 1 week  Follow-up with cardiology in 1 week  Other Instructions -   Call your provider if you gain more than 2 lb over 24 hours or more than 5 lb in a week  Please review this entire after visit summary as additional general instructions including medication list, appointments, activity, diet, any pertinent wound care, and other additional recommendations from your care team that may be provided for you        Sincerely,     Nj John MD

## 2022-11-10 LAB
BACTERIA BLD CULT: NORMAL
BACTERIA BLD CULT: NORMAL

## 2022-11-18 DIAGNOSIS — R11.2 NAUSEA AND VOMITING, UNSPECIFIED VOMITING TYPE: ICD-10-CM

## 2022-11-18 RX ORDER — PANTOPRAZOLE SODIUM 40 MG/1
TABLET, DELAYED RELEASE ORAL
Qty: 30 TABLET | Refills: 2 | Status: SHIPPED | OUTPATIENT
Start: 2022-11-18

## 2022-12-09 ENCOUNTER — REMOTE DEVICE CLINIC VISIT (OUTPATIENT)
Dept: CARDIOLOGY CLINIC | Facility: CLINIC | Age: 57
End: 2022-12-09

## 2022-12-09 DIAGNOSIS — Z95.0 PRESENCE OF CARDIAC PACEMAKER: Primary | ICD-10-CM

## 2022-12-09 NOTE — PROGRESS NOTES
Results for orders placed or performed in visit on 12/09/22   Cardiac EP device report    Narrative    MDT-DUAL CHAMBER ICD (AAI-DDD MODE) - ACTIVE SYSTEM IS MRI CONDITIONAL  CARELINK TRANSMISSION: BATTERY VOLTAGE ADEQUATE (2 8 YRS)  AP: 88 5%  : 0 8% (MVP-ON)  ALL AVAILABLE LEAD PARAMETERS WITHIN NORMAL LIMITS  1,036 AT/AF EPISODES W/ AVAIL EGMS SHOWING AF, MAX DURATION 5 MINS 38 SECS  AF BURDEN: 15 1%  PT TAKES XARELTO, AMIODARONE, METOPROLOL SUCC  EF: 55% (ECHO 7/28/22)  OPTI-VOL WITHIN NORMAL LIMITS  APPROPRIATELY FUNCTIONING ICD    [address]

## 2022-12-18 DIAGNOSIS — N18.30 BENIGN HYPERTENSION WITH CKD (CHRONIC KIDNEY DISEASE) STAGE III (HCC): ICD-10-CM

## 2022-12-18 DIAGNOSIS — I12.9 BENIGN HYPERTENSION WITH CKD (CHRONIC KIDNEY DISEASE) STAGE III (HCC): ICD-10-CM

## 2022-12-18 DIAGNOSIS — I48.0 PAROXYSMAL A-FIB (HCC): ICD-10-CM

## 2022-12-19 RX ORDER — METOPROLOL SUCCINATE 50 MG/1
TABLET, EXTENDED RELEASE ORAL
Qty: 180 TABLET | Refills: 5 | Status: SHIPPED | OUTPATIENT
Start: 2022-12-19

## 2022-12-19 RX ORDER — DOXAZOSIN 2 MG/1
TABLET ORAL
Qty: 30 TABLET | Refills: 5 | Status: SHIPPED | OUTPATIENT
Start: 2022-12-19

## 2022-12-20 ENCOUNTER — HOSPITAL ENCOUNTER (INPATIENT)
Facility: HOSPITAL | Age: 57
LOS: 2 days | Discharge: HOME | End: 2022-12-22
Attending: EMERGENCY MEDICINE | Admitting: HOSPITALIST

## 2022-12-20 ENCOUNTER — APPOINTMENT (EMERGENCY)
Dept: RADIOLOGY | Facility: HOSPITAL | Age: 57
End: 2022-12-20

## 2022-12-20 DIAGNOSIS — I48.91 ATRIAL FIBRILLATION WITH RAPID VENTRICULAR RESPONSE (HCC): Primary | ICD-10-CM

## 2022-12-20 DIAGNOSIS — I50.41 ACUTE COMBINED SYSTOLIC AND DIASTOLIC HEART FAILURE (HCC): ICD-10-CM

## 2022-12-20 DIAGNOSIS — Z72.0 TOBACCO ABUSE: ICD-10-CM

## 2022-12-20 DIAGNOSIS — I50.40 COMBINED SYSTOLIC AND DIASTOLIC HEART FAILURE (HCC): ICD-10-CM

## 2022-12-20 DIAGNOSIS — R51.9 INTRACTABLE HEADACHE, UNSPECIFIED CHRONICITY PATTERN, UNSPECIFIED HEADACHE TYPE: ICD-10-CM

## 2022-12-20 LAB
2HR DELTA HS TROPONIN: 3 NG/L
4HR DELTA HS TROPONIN: -5 NG/L
ALBUMIN SERPL BCP-MCNC: 4.3 G/DL (ref 3.5–5)
ALP SERPL-CCNC: 62 U/L (ref 34–104)
ALT SERPL W P-5'-P-CCNC: 16 U/L (ref 7–52)
ANION GAP SERPL CALCULATED.3IONS-SCNC: 5 MMOL/L (ref 4–13)
AST SERPL W P-5'-P-CCNC: 16 U/L (ref 13–39)
BACTERIA UR QL AUTO: ABNORMAL /HPF
BASOPHILS # BLD AUTO: 0.04 THOUSANDS/ÂΜL (ref 0–0.1)
BASOPHILS NFR BLD AUTO: 0 % (ref 0–1)
BILIRUB SERPL-MCNC: 0.43 MG/DL (ref 0.2–1)
BILIRUB UR QL STRIP: NEGATIVE
BNP SERPL-MCNC: 2056 PG/ML (ref 0–100)
BUN SERPL-MCNC: 26 MG/DL (ref 5–25)
CALCIUM SERPL-MCNC: 9.6 MG/DL (ref 8.4–10.2)
CARDIAC TROPONIN I PNL SERPL HS: 45 NG/L
CARDIAC TROPONIN I PNL SERPL HS: 50 NG/L
CARDIAC TROPONIN I PNL SERPL HS: 53 NG/L
CHLORIDE SERPL-SCNC: 105 MMOL/L (ref 96–108)
CLARITY UR: CLEAR
CO2 SERPL-SCNC: 30 MMOL/L (ref 21–32)
COLOR UR: YELLOW
CREAT SERPL-MCNC: 1.83 MG/DL (ref 0.6–1.3)
EOSINOPHIL # BLD AUTO: 0.05 THOUSAND/ÂΜL (ref 0–0.61)
EOSINOPHIL NFR BLD AUTO: 1 % (ref 0–6)
ERYTHROCYTE [DISTWIDTH] IN BLOOD BY AUTOMATED COUNT: 14.7 % (ref 11.6–15.1)
GFR SERPL CREATININE-BSD FRML MDRD: 30 ML/MIN/1.73SQ M
GLUCOSE SERPL-MCNC: 74 MG/DL (ref 65–140)
GLUCOSE UR STRIP-MCNC: NEGATIVE MG/DL
HCT VFR BLD AUTO: 37.7 % (ref 34.8–46.1)
HGB BLD-MCNC: 11.4 G/DL (ref 11.5–15.4)
HGB UR QL STRIP.AUTO: NEGATIVE
IMM GRANULOCYTES # BLD AUTO: 0.08 THOUSAND/UL (ref 0–0.2)
IMM GRANULOCYTES NFR BLD AUTO: 1 % (ref 0–2)
KETONES UR STRIP-MCNC: NEGATIVE MG/DL
LEUKOCYTE ESTERASE UR QL STRIP: ABNORMAL
LYMPHOCYTES # BLD AUTO: 1.84 THOUSANDS/ÂΜL (ref 0.6–4.47)
LYMPHOCYTES NFR BLD AUTO: 18 % (ref 14–44)
MAGNESIUM SERPL-MCNC: 2.5 MG/DL (ref 1.9–2.7)
MCH RBC QN AUTO: 25.9 PG (ref 26.8–34.3)
MCHC RBC AUTO-ENTMCNC: 30.2 G/DL (ref 31.4–37.4)
MCV RBC AUTO: 86 FL (ref 82–98)
MONOCYTES # BLD AUTO: 0.83 THOUSAND/ÂΜL (ref 0.17–1.22)
MONOCYTES NFR BLD AUTO: 8 % (ref 4–12)
NEUTROPHILS # BLD AUTO: 7.27 THOUSANDS/ÂΜL (ref 1.85–7.62)
NEUTS SEG NFR BLD AUTO: 72 % (ref 43–75)
NITRITE UR QL STRIP: NEGATIVE
NON-SQ EPI CELLS URNS QL MICRO: ABNORMAL /HPF
NRBC BLD AUTO-RTO: 0 /100 WBCS
PH UR STRIP.AUTO: 5.5 [PH]
PLATELET # BLD AUTO: 218 THOUSANDS/UL (ref 149–390)
PMV BLD AUTO: 10.9 FL (ref 8.9–12.7)
POTASSIUM SERPL-SCNC: 4.1 MMOL/L (ref 3.5–5.3)
PROT SERPL-MCNC: 8 G/DL (ref 6.4–8.4)
PROT UR STRIP-MCNC: ABNORMAL MG/DL
RBC # BLD AUTO: 4.41 MILLION/UL (ref 3.81–5.12)
RBC #/AREA URNS AUTO: ABNORMAL /HPF
SODIUM SERPL-SCNC: 140 MMOL/L (ref 135–147)
SP GR UR STRIP.AUTO: 1.03 (ref 1–1.03)
UROBILINOGEN UR STRIP-ACNC: <2 MG/DL
WBC # BLD AUTO: 10.11 THOUSAND/UL (ref 4.31–10.16)
WBC #/AREA URNS AUTO: ABNORMAL /HPF

## 2022-12-20 PROCEDURE — 4A02X4Z MEASUREMENT OF CARDIAC ELECTRICAL ACTIVITY, EXTERNAL APPROACH: ICD-10-PCS | Performed by: INTERNAL MEDICINE

## 2022-12-20 RX ORDER — AMLODIPINE BESYLATE 5 MG/1
5 TABLET ORAL 2 TIMES DAILY
Status: DISCONTINUED | OUTPATIENT
Start: 2022-12-20 | End: 2022-12-22 | Stop reason: HOSPADM

## 2022-12-20 RX ORDER — AMIODARONE HYDROCHLORIDE 200 MG/1
200 TABLET ORAL DAILY
Status: DISCONTINUED | OUTPATIENT
Start: 2022-12-21 | End: 2022-12-22 | Stop reason: HOSPADM

## 2022-12-20 RX ORDER — MAGNESIUM SULFATE 1 G/100ML
1 INJECTION INTRAVENOUS ONCE
Status: COMPLETED | OUTPATIENT
Start: 2022-12-20 | End: 2022-12-20

## 2022-12-20 RX ORDER — DIPHENHYDRAMINE HYDROCHLORIDE 50 MG/ML
25 INJECTION INTRAMUSCULAR; INTRAVENOUS ONCE
Status: COMPLETED | OUTPATIENT
Start: 2022-12-20 | End: 2022-12-20

## 2022-12-20 RX ORDER — ACETAMINOPHEN 325 MG/1
650 TABLET ORAL EVERY 4 HOURS PRN
Status: DISCONTINUED | OUTPATIENT
Start: 2022-12-20 | End: 2022-12-20

## 2022-12-20 RX ORDER — DILTIAZEM HYDROCHLORIDE 5 MG/ML
20 INJECTION INTRAVENOUS ONCE
Status: COMPLETED | OUTPATIENT
Start: 2022-12-20 | End: 2022-12-20

## 2022-12-20 RX ORDER — DOXAZOSIN MESYLATE 1 MG/1
2 TABLET ORAL
Status: DISCONTINUED | OUTPATIENT
Start: 2022-12-20 | End: 2022-12-22 | Stop reason: HOSPADM

## 2022-12-20 RX ORDER — FUROSEMIDE 10 MG/ML
40 INJECTION INTRAMUSCULAR; INTRAVENOUS
Status: DISCONTINUED | OUTPATIENT
Start: 2022-12-20 | End: 2022-12-21

## 2022-12-20 RX ORDER — PANTOPRAZOLE SODIUM 40 MG/1
40 TABLET, DELAYED RELEASE ORAL
Status: DISCONTINUED | OUTPATIENT
Start: 2022-12-21 | End: 2022-12-22 | Stop reason: HOSPADM

## 2022-12-20 RX ORDER — ACETAMINOPHEN 325 MG/1
650 TABLET ORAL EVERY 6 HOURS PRN
Status: DISCONTINUED | OUTPATIENT
Start: 2022-12-20 | End: 2022-12-22 | Stop reason: HOSPADM

## 2022-12-20 RX ADMIN — SODIUM CHLORIDE 500 MG: 0.9 INJECTION, SOLUTION INTRAVENOUS at 22:10

## 2022-12-20 RX ADMIN — DILTIAZEM HYDROCHLORIDE 20 MG: 5 INJECTION INTRAVENOUS at 17:28

## 2022-12-20 RX ADMIN — METOPROLOL SUCCINATE 150 MG: 100 TABLET, EXTENDED RELEASE ORAL at 22:16

## 2022-12-20 RX ADMIN — RIVAROXABAN 15 MG: 15 TABLET, FILM COATED ORAL at 22:17

## 2022-12-20 RX ADMIN — ACETAMINOPHEN 650 MG: 325 TABLET ORAL at 20:17

## 2022-12-20 RX ADMIN — DILTIAZEM HYDROCHLORIDE 5 MG/HR: 5 INJECTION, SOLUTION INTRAVENOUS at 17:29

## 2022-12-20 RX ADMIN — MAGNESIUM SULFATE HEPTAHYDRATE 1 G: 1 INJECTION, SOLUTION INTRAVENOUS at 22:10

## 2022-12-20 RX ADMIN — DOXAZOSIN 2 MG: 1 TABLET ORAL at 22:17

## 2022-12-20 RX ADMIN — FUROSEMIDE 40 MG: 10 INJECTION, SOLUTION INTRAMUSCULAR; INTRAVENOUS at 22:16

## 2022-12-20 RX ADMIN — AMLODIPINE BESYLATE 5 MG: 5 TABLET ORAL at 22:17

## 2022-12-20 RX ADMIN — DIPHENHYDRAMINE HYDROCHLORIDE 25 MG: 50 INJECTION, SOLUTION INTRAMUSCULAR; INTRAVENOUS at 22:09
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
## 2022-12-20 NOTE — ED ATTENDING ATTESTATION
12/20/2022  IRashida MD, saw and evaluated the patient  I have discussed the patient with the resident/non-physician practitioner and agree with the resident's/non-physician practitioner's findings, Plan of Care, and MDM as documented in the resident's/non-physician practitioner's note, except where noted  All available labs and Radiology studies were reviewed  I was present for key portions of any procedure(s) performed by the resident/non-physician practitioner and I was immediately available to provide assistance  At this point I agree with the current assessment done in the Emergency Department  I have conducted an independent evaluation of this patient a history and physical is as follows:    51-year-old female presenting to emergency department due to palpitations and dizziness  Found to be in A  fib RVR  History of A  fib  On blood thinner already  No missed doses  Denies chest pain or shortness of breath  Denies calf pain or swelling  History of CHF  On exam has tachycardia with irregular rhythm  Lungs are clear  No edema      Agree with cardiac evaluation, Cardizem, admission to hospital        ED Course         Critical Care Time  Procedures

## 2022-12-21 ENCOUNTER — APPOINTMENT (INPATIENT)
Dept: NON INVASIVE DIAGNOSTICS | Facility: HOSPITAL | Age: 57
End: 2022-12-21

## 2022-12-21 LAB
ANION GAP SERPL CALCULATED.3IONS-SCNC: 8 MMOL/L (ref 4–13)
AORTIC ROOT: 3.2 CM
APICAL FOUR CHAMBER EJECTION FRACTION: 72 %
ASCENDING AORTA: 3.2 CM
AV PEAK GRADIENT: 15 MMHG
BASOPHILS # BLD AUTO: 0.02 THOUSANDS/ÂΜL (ref 0–0.1)
BASOPHILS NFR BLD AUTO: 0 % (ref 0–1)
BUN SERPL-MCNC: 27 MG/DL (ref 5–25)
CALCIUM SERPL-MCNC: 8.9 MG/DL (ref 8.4–10.2)
CHLORIDE SERPL-SCNC: 102 MMOL/L (ref 96–108)
CO2 SERPL-SCNC: 28 MMOL/L (ref 21–32)
CREAT SERPL-MCNC: 1.88 MG/DL (ref 0.6–1.3)
E WAVE DECELERATION TIME: 248 MS
EOSINOPHIL # BLD AUTO: 0.01 THOUSAND/ÂΜL (ref 0–0.61)
EOSINOPHIL NFR BLD AUTO: 0 % (ref 0–6)
ERYTHROCYTE [DISTWIDTH] IN BLOOD BY AUTOMATED COUNT: 14.6 % (ref 11.6–15.1)
FRACTIONAL SHORTENING: 29 (ref 28–44)
GFR SERPL CREATININE-BSD FRML MDRD: 29 ML/MIN/1.73SQ M
GLUCOSE SERPL-MCNC: 197 MG/DL (ref 65–140)
HCT VFR BLD AUTO: 35.5 % (ref 34.8–46.1)
HGB BLD-MCNC: 10.8 G/DL (ref 11.5–15.4)
IMM GRANULOCYTES # BLD AUTO: 0.04 THOUSAND/UL (ref 0–0.2)
IMM GRANULOCYTES NFR BLD AUTO: 1 % (ref 0–2)
INTERVENTRICULAR SEPTUM IN DIASTOLE (PARASTERNAL SHORT AXIS VIEW): 1.7 CM
INTERVENTRICULAR SEPTUM: 1.7 CM (ref 0.6–1.1)
LAAS-AP2: 24.6 CM2
LAAS-AP4: 25.9 CM2
LEFT ATRIUM SIZE: 3.9 CM
LEFT INTERNAL DIMENSION IN SYSTOLE: 3.6 CM (ref 2.1–4)
LEFT VENTRICULAR INTERNAL DIMENSION IN DIASTOLE: 5.1 CM (ref 3.5–6)
LEFT VENTRICULAR POSTERIOR WALL IN END DIASTOLE: 1.1 CM
LEFT VENTRICULAR STROKE VOLUME: 69 ML
LVSV (TEICH): 69 ML
LYMPHOCYTES # BLD AUTO: 0.58 THOUSANDS/ÂΜL (ref 0.6–4.47)
LYMPHOCYTES NFR BLD AUTO: 11 % (ref 14–44)
MCH RBC QN AUTO: 26.2 PG (ref 26.8–34.3)
MCHC RBC AUTO-ENTMCNC: 30.4 G/DL (ref 31.4–37.4)
MCV RBC AUTO: 86 FL (ref 82–98)
MONOCYTES # BLD AUTO: 0.04 THOUSAND/ÂΜL (ref 0.17–1.22)
MONOCYTES NFR BLD AUTO: 1 % (ref 4–12)
MV E'TISSUE VEL-SEP: 5 CM/S
MV PEAK A VEL: 0.38 M/S
MV PEAK E VEL: 113 CM/S
MV STENOSIS PRESSURE HALF TIME: 72 MS
MV VALVE AREA P 1/2 METHOD: 3.06
NEUTROPHILS # BLD AUTO: 4.67 THOUSANDS/ÂΜL (ref 1.85–7.62)
NEUTS SEG NFR BLD AUTO: 87 % (ref 43–75)
NRBC BLD AUTO-RTO: 0 /100 WBCS
PLATELET # BLD AUTO: 166 THOUSANDS/UL (ref 149–390)
PMV BLD AUTO: 11.1 FL (ref 8.9–12.7)
POTASSIUM SERPL-SCNC: 4 MMOL/L (ref 3.5–5.3)
RBC # BLD AUTO: 4.13 MILLION/UL (ref 3.81–5.12)
RIGHT ATRIUM AREA SYSTOLE A4C: 18 CM2
RIGHT VENTRICLE ID DIMENSION: 4.8 CM
SL CV LEFT ATRIUM LENGTH A2C: 6.2 CM
SL CV LV EF: 65
SL CV PED ECHO LEFT VENTRICLE DIASTOLIC VOLUME (MOD BIPLANE) 2D: 122 ML
SL CV PED ECHO LEFT VENTRICLE SYSTOLIC VOLUME (MOD BIPLANE) 2D: 53 ML
SODIUM SERPL-SCNC: 138 MMOL/L (ref 135–147)
TR MAX PG: 47 MMHG
TR PEAK VELOCITY: 3.4 M/S
TRICUSPID VALVE PEAK REGURGITATION VELOCITY: 3.43 M/S
TSH SERPL DL<=0.05 MIU/L-ACNC: 0.93 UIU/ML (ref 0.45–4.5)
WBC # BLD AUTO: 5.36 THOUSAND/UL (ref 4.31–10.16)

## 2022-12-21 RX ORDER — OXYCODONE HYDROCHLORIDE AND ACETAMINOPHEN 5; 325 MG/1; MG/1
1 TABLET ORAL EVERY 6 HOURS PRN
Status: DISCONTINUED | OUTPATIENT
Start: 2022-12-21 | End: 2022-12-22 | Stop reason: HOSPADM

## 2022-12-21 RX ORDER — FUROSEMIDE 40 MG/1
40 TABLET ORAL DAILY
Status: DISCONTINUED | OUTPATIENT
Start: 2022-12-22 | End: 2022-12-22 | Stop reason: HOSPADM

## 2022-12-21 RX ORDER — OXYCODONE HYDROCHLORIDE AND ACETAMINOPHEN 5; 325 MG/1; MG/1
TABLET ORAL
COMMUNITY
Start: 2022-09-30

## 2022-12-21 RX ADMIN — AMIODARONE HYDROCHLORIDE 200 MG: 200 TABLET ORAL at 08:00

## 2022-12-21 RX ADMIN — OXYCODONE HYDROCHLORIDE AND ACETAMINOPHEN 1 TABLET: 5; 325 TABLET ORAL at 21:01

## 2022-12-21 RX ADMIN — ACETAMINOPHEN 650 MG: 325 TABLET ORAL at 16:06

## 2022-12-21 RX ADMIN — ACETAMINOPHEN 650 MG: 325 TABLET ORAL at 07:58

## 2022-12-21 RX ADMIN — AMLODIPINE BESYLATE 5 MG: 5 TABLET ORAL at 08:00

## 2022-12-21 RX ADMIN — METOPROLOL SUCCINATE 150 MG: 100 TABLET, EXTENDED RELEASE ORAL at 08:00

## 2022-12-21 RX ADMIN — FUROSEMIDE 40 MG: 10 INJECTION, SOLUTION INTRAMUSCULAR; INTRAVENOUS at 07:59

## 2022-12-21 RX ADMIN — OXYCODONE HYDROCHLORIDE AND ACETAMINOPHEN 1 TABLET: 5; 325 TABLET ORAL at 13:06

## 2022-12-21 RX ADMIN — AMLODIPINE BESYLATE 5 MG: 5 TABLET ORAL at 17:34

## 2022-12-21 RX ADMIN — NICOTINE 1 PATCH: 7 PATCH, EXTENDED RELEASE TRANSDERMAL at 08:00

## 2022-12-21 RX ADMIN — DOXAZOSIN 2 MG: 1 TABLET ORAL at 21:01

## 2022-12-21 RX ADMIN — METOPROLOL SUCCINATE 150 MG: 100 TABLET, EXTENDED RELEASE ORAL at 21:01

## 2022-12-21 RX ADMIN — RIVAROXABAN 15 MG: 15 TABLET, FILM COATED ORAL at 17:34

## 2022-12-21 RX ADMIN — PANTOPRAZOLE SODIUM 40 MG: 40 TABLET, DELAYED RELEASE ORAL at 04:56

## 2022-12-21 NOTE — ASSESSMENT & PLAN NOTE
· Patient endorses substernal intermittent chest pain with associated ABDUL   · Troponin 50 > 53 > 45   · Likely chest pain in setting on afib with RVR and acute CHF exacerbation   · EKG showing afib with RVR, RBBB, left anterior fasicular block   · Chest pain free on my exam   · Cardiology consult

## 2022-12-21 NOTE — ASSESSMENT & PLAN NOTE
· Patient with history of hypertension  · Uncontrolled on admission; 203/116  · Now resolved; continue home medication regimen  · Monitor closely

## 2022-12-21 NOTE — ASSESSMENT & PLAN NOTE
Lab Results   Component Value Date    EGFR 29 12/21/2022    EGFR 30 12/20/2022    EGFR 31 11/06/2022    CREATININE 1 88 (H) 12/21/2022    CREATININE 1 83 (H) 12/20/2022    CREATININE 1 79 (H) 11/06/2022     Chronic, baseline creatinine noted to be 1 6-1 8  Currently within baseline  Avoid nephrotoxins, hypotension  Monitor BMP; monitor closely in setting of IV diuresis
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
## 2022-12-21 NOTE — ASSESSMENT & PLAN NOTE
· Patient endorses HA which appears to be a chronic issue   · Unable to give toradol due to GFR  · Will give Tylenol, IV solumedrol, benadryl, magnesium for HA

## 2022-12-21 NOTE — PROGRESS NOTES
[facility]  Progress Note - Sean Horner 1965, 62 y o  female MRN: [id number]  Unit/Bed#: S -01 Encounter: [id number]  Primary Care Provider: Darnell Will MD   Date and time admitted to hospital: 12/20/2022  4:54 PM    * Acute on chronic heart failure with preserved ejection fraction [facility])  Assessment & Plan  Wt Readings from Last 3 Encounters:   11/06/22 102 kg (224 lb 9 6 oz)   10/13/22 101 kg (223 lb 1 7 oz)   10/09/22 101 kg (222 lb)     · Patient presented to the ED with complaints of palpitations, chest pain, shortness of breath with dyspnea on exertion  · Chest x-ray (12/20/22): Pulmonary vascular congestion with a BNP of 2056  · Patient typically takes 40 mg PO Lasix daily at home; will give 40 mg IV Lasix BID here  · Monitor strict I&O's; Daily standing weights  · Follow low salt diet  · Cardiology consult appreciated    Hypertension, uncontrolled  Assessment & Plan  · Patient with history of hypertension  · Uncontrolled on admission; 203/116  · Now resolved; continue home medication regimen  · Monitor closely     CKD (chronic kidney disease) stage 3, GFR 30-59 ml/min [facility])  Assessment & Plan  Lab Results   Component Value Date    EGFR 29 12/21/2022    EGFR 30 12/20/2022    EGFR 31 11/06/2022    CREATININE 1 88 (H) 12/21/2022    CREATININE 1 83 (H) 12/20/2022    CREATININE 1 79 (H) 11/06/2022     Chronic, baseline creatinine noted to be 1 6-1 8  Currently within baseline  Avoid nephrotoxins, hypotension  Monitor BMP; monitor closely in setting of IV diuresis    Headache  Assessment & Plan  · Patient with history of headaches; present on admission  · Unable to give Toradol in setting of GFR  · Can give migraine cocktail with Solu-Medrol if headache continues  Atrial fibrillation with rapid ventricular response (HCC)  Assessment & Plan  · Patient with history of A-Fib; Presented to the ER in RVR with heart rate in the 130s    · Likely exacerbated by acute CHF exacerbation  · Noted to have undergone an ablation in 2022  · Given Cardizem bolus in ER; Heart rate now controlled, 60-70s  · Continue home medications of Amiodarone/Metoprolol   · Continue Xarelto for [facility]  · Monitor on telemetry x 24 hrs    Chest pain  Assessment & Plan  · Presented to the ER with complaints of intermittent substernal chest pain with dyspnea on exertion  · Troponin levels remained flat x 3  · Likely in setting of CHF exacerbation/A-Fib with RVR  · Cardiology consult, appreciate input    VTE Pharmacologic Prophylaxis: VTE Score: 3 Moderate Risk (Score 3-4) - Pharmacological DVT Prophylaxis Ordered: rivaroxaban (Xarelto)  Patient Centered Rounds: I performed bedside rounds with nursing staff today  Discussions with Specialists or Other Care Team Provider: Case Management, Cardiology    Education and Discussions with Family / Patient: Patient updated  while this provider was at bedside  Time Spent for Care: 20 minutes  More than 50% of total time spent on counseling and coordination of care as described above  Current Length of Stay: 1 day(s)  Current Patient Status: Inpatient   Certification Statement: The patient will continue to require additional inpatient hospital stay due to ongoing treatment in setting of need for IV lasix and echo  Discharge Plan: Anticipate discharge in 24-48 hrs to home  Code Status: Level 1 - Full Code    Subjective:   Patient resting in bed, denies complaints of chest pain, shortness of breath, fever, or chills  Feels better  Wants to take a shower  Asking for percocet for her headache  Objective:     Vitals:   Temp (24hrs), Av 2 °F (36 8 °C), Min:97 6 °F (36 4 °C), Max:98 6 °F (37 °C)    Temp:  [97 6 °F (36 4 °C)-98 6 °F (37 °C)] 97 6 °F (36 4 °C)  HR:  [] 60  Resp:  [15-20] 18  BP: (136-203)/() 146/80  SpO2:  [93 %-97 %] 93 %  Body mass index is 36 39 kg/m²       Input and Output Summary (last 24 hours): Intake/Output Summary (Last 24 hours) at 12/21/2022 1302  Last data filed at 12/21/2022 0501  Gross per 24 hour   Intake 740 ml   Output 500 ml   Net 240 ml       Physical Exam:   Physical Exam  Vitals and nursing note reviewed  Constitutional:       General: She is not in acute distress  Appearance: She is normal weight  She is ill-appearing  Cardiovascular:      Rate and Rhythm: Normal rate  Pulses: Normal pulses  Heart sounds: Normal heart sounds  Pulmonary:      Effort: Pulmonary effort is normal  No tachypnea, bradypnea or respiratory distress  Breath sounds: Decreased breath sounds present  Comments: RA 95%  Abdominal:      General: Bowel sounds are normal       Palpations: Abdomen is soft  Musculoskeletal:         General: Normal range of motion  Right lower leg: No edema  Left lower leg: No edema  Skin:     General: Skin is warm and dry  Neurological:      Mental Status: She is alert and oriented to person, place, and time  Psychiatric:         Mood and Affect: Mood normal           Additional Data:     Labs:  Results from last 7 days   Lab Units 12/21/22  0531   WBC Thousand/uL 5 36   HEMOGLOBIN g/dL 10 8*   HEMATOCRIT % 35 5   PLATELETS Thousands/uL 166   NEUTROS PCT % 87*   LYMPHS PCT % 11*   MONOS PCT % 1*   EOS PCT % 0     Results from last 7 days   Lab Units 12/21/22  0531 12/20/22  1720   SODIUM mmol/L 138 140   POTASSIUM mmol/L 4 0 4 1   CHLORIDE mmol/L 102 105   CO2 mmol/L 28 30   BUN mg/dL 27* 26*   CREATININE mg/dL 1 88* 1 83*   ANION GAP mmol/L 8 5   CALCIUM mg/dL 8 9 9 6   ALBUMIN g/dL  --  4 3   TOTAL BILIRUBIN mg/dL  --  0 43   ALK PHOS U/L  --  62   ALT U/L  --  16   AST U/L  --  16   GLUCOSE RANDOM mg/dL 197* 74                       Lines/Drains:  Invasive Devices     Peripheral Intravenous Line  Duration           Peripheral IV 12/20/22 Dorsal (posterior); Right Forearm <1 day                      Imaging: No pertinent imaging reviewed  Recent Cultures (last 7 days):         Last 24 Hours Medication List:   Current Facility-Administered Medications   Medication Dose Route Frequency Provider Last Rate   • acetaminophen  650 mg Oral Q6H PRN Katie Saunders PA-C     • amiodarone  200 mg Oral Daily Katie Saunders PA-C     • amLODIPine  5 mg Oral BID Katie Saunders PA-C     • doxazosin  2 mg Oral HS Katie Saunders PA-C     • [START ON 12/22/2022] furosemide  40 mg Oral Daily CHARLOTTE Taylor     • metoprolol succinate  150 mg Oral BID CHARLOTTE Wills     • nicotine  1 patch Transdermal Daily Katie Saunders PA-C     • oxyCODONE-acetaminophen  1 tablet Oral Q6H PRN CHARLOTTE Burns     • pantoprazole  40 mg Oral Daily Before Breakfast Katie Saunders PA-C     • rivaroxaban  15 mg Oral QPM Katie Saunders PA-C          Today, Patient Was Seen By: CHARLOTTE Burns    **Please Note: This note may have been constructed using a voice recognition system  **

## 2022-12-21 NOTE — ASSESSMENT & PLAN NOTE
Wt Readings from Last 3 Encounters:   11/06/22 102 kg (224 lb 9 6 oz)   10/13/22 101 kg (223 lb 1 7 oz)   10/09/22 101 kg (222 lb)     · Patient presented to the ED with complaints of palpitations, chest pain, shortness of breath with dyspnea on exertion  · Chest x-ray (12/20/22): Pulmonary vascular congestion with a BNP of 2056  · Patient typically takes 40 mg PO Lasix daily at home; will give 40 mg IV Lasix BID here  · Monitor strict I&O's; Daily standing weights    · Follow low salt diet  · Cardiology consult appreciated

## 2022-12-21 NOTE — ASSESSMENT & PLAN NOTE
· Patient with history of A-Fib; Presented to the ER in RVR with heart rate in the 130s  · Likely exacerbated by acute CHF exacerbation  · Noted to have undergone an ablation in June 2022  · Given Cardizem bolus in ER;  Heart rate now controlled, 60-70s  · Continue home medications of Amiodarone/Metoprolol   · Continue Xarelto for AC  · Monitor on telemetry x 24 hrs

## 2022-12-21 NOTE — ASSESSMENT & PLAN NOTE
Wt Readings from Last 3 Encounters:   11/06/22 102 kg (224 lb 9 6 oz)   10/13/22 101 kg (223 lb 1 7 oz)   10/09/22 101 kg (222 lb)     · POA a/e/b exertional dyspnea, CXR with pulmonary vascular congestion, BNP 2056  · Home diuretic: lasix 40mg daily   · Will place on IV lasix 40mg BID   · Salt and fluid restriction   · Strict I/O, Daily weights   · Cardiology consult appreciated
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
## 2022-12-21 NOTE — UTILIZATION REVIEW
Initial Clinical Review    Admission: Date/Time/Statement:   Admission Orders (From admission, onward)     Ordered        12/20/22 1811  INPATIENT ADMISSION  Once                      Orders Placed This Encounter   Procedures   • INPATIENT ADMISSION     Standing Status:   Standing     Number of Occurrences:   1     Order Specific Question:   Level of Care     Answer:   Med Surg [16]     Order Specific Question:   Estimated length of stay     Answer:   More than 2 Midnights     Order Specific Question:   Certification     Answer:   I certify that inpatient services are medically necessary for this patient for a duration of greater than two midnights  See H&P and MD Progress Notes for additional information about the patient's course of treatment  ED Arrival Information     Expected   -    Arrival   12/20/2022 16:29    Acuity   Emergent            Means of arrival   Wheelchair    Escorted by   Spouse    Service   Hospitalist    Admission type   Emergency            Arrival complaint   elevated heart rate           Chief Complaint   Patient presents with   • Atrial Fibrillation     Pt states she was sent for "an a fib attack"  started at 1500 reports CP and palpitations   Initial Presentation: 62 y o  female with a PMH of afib on Xarelto, CHF with ICD, HTN, HLD who presents with palpitations, chest pain, SOB, ABDUL, HA that started earlier this afternoon  She reports that her chest pain feels like pressure and is substernal and occurred at the same time as her palpitations  She reports worsening of SOB with ambulation  Since patient's HR has improved she reports she no longer feels palpitations and her chest pain has improved  At this moment her only complaint is ABDUL and frontal HA for which she was requesting Tramadol   Plan: Inpatient admission for evaluation and treatment of acute on chronic CHF, Afib with RVR, chest pain, HTN, headache: IV lasix 40 mg bid, salt and fluid restriction, Cardiology consult, continue Xarelto, amiodarone and metoprolol  Telemetry, continue Norvasc, Cardura and Toprol XL, give Tylenol, IV solu medrol, benadryl and magnesium for headache  Date: 12/21   Day 2:     Cardiology consult: d/c IV lasix - start home dosing tomorrow morning, Echo pending, telemetry, continue Xarelto, Toprol XL, amiodarone, amlodipine and Cardura  Internal medicine: continue IV lasix 40 mg bid, low salt diet, monitor BMP, continue home amiodarone, metoprolol and Xarelto, telemetry  ED Triage Vitals   Temperature Pulse Respirations Blood Pressure SpO2   12/20/22 1652 12/20/22 1652 12/20/22 1652 12/20/22 1652 12/20/22 1652   98 4 °F (36 9 °C) (!) 136 20 (!) 203/116 95 %      Temp Source Heart Rate Source Patient Position - Orthostatic VS BP Location FiO2 (%)   12/20/22 1652 12/20/22 1652 12/20/22 1652 12/20/22 1652 --   Oral Monitor Sitting Right arm       Pain Score       12/20/22 1742       9          Wt Readings from Last 1 Encounters:   12/21/22 105 kg (232 lb 5 8 oz)     Additional Vital Signs:     Date/Time Temp Pulse Resp BP MAP (mmHg) SpO2 O2 Device   12/21/22 0724 97 6 °F (36 4 °C) 60 18 146/80 102 93 % --   12/20/22 21:37:38 98 6 °F (37 °C) 57 15 136/74 95 96 % --   12/20/22 2100 -- 60 -- -- -- -- --   12/20/22 1900 -- 68 -- 151/96 114 -- --   12/20/22 1856 -- 83 -- -- -- -- --   12/20/22 18:52:12 98 °F (36 7 °C) 64 -- 151/96 114 95 % --   12/20/22 1815 -- 72 -- 154/78 106 97 % --   12/20/22 1800 -- 71 16 156/83 114 97 % --   12/20/22 1745 -- 71 -- 165/84 121 96 % --   12/20/22 1742 -- 73   16 153/92 -- 97 % None (Room air)     Pertinent Labs/Diagnostic Test Results:   XR chest 1 view portable   Final Result by Brooke Desai MD (12/21 0813)      Stable cardiomegaly  No active pulmonary disease  Workstation performed: [id number]           12/21 Echo: Interpretation Summary       •  Left Ventricle: Left ventricular cavity size is normal  There is mild concentric hypertrophy   The left ventricular ejection fraction is 65%  Systolic function is normal  Wall motion is normal  Diastolic function is moderately abnormal, consistent with grade II (pseudonormal) relaxation  •  Right Ventricle: Right ventricular cavity size is normal  Systolic function is normal  A pacer wire is present  •  Left Atrium: The atrium is mildly dilated  •  Mitral Valve: There is mild regurgitation  •  Prior TTE study available for comparison  Prior study date: 7/28/2022  No significant changes noted compared to the prior study          Results from last 7 days   Lab Units 12/21/22  0531 12/20/22  1720   WBC Thousand/uL 5 36 10 11   HEMOGLOBIN g/dL 10 8* 11 4*   HEMATOCRIT % 35 5 37 7   PLATELETS Thousands/uL 166 218   NEUTROS ABS Thousands/µL 4 67 7 27         Results from last 7 days   Lab Units 12/21/22  0531 12/20/22  1720   SODIUM mmol/L 138 140   POTASSIUM mmol/L 4 0 4 1   CHLORIDE mmol/L 102 105   CO2 mmol/L 28 30   ANION GAP mmol/L 8 5   BUN mg/dL 27* 26*   CREATININE mg/dL 1 88* 1 83*   EGFR ml/min/1 73sq m 29 30   CALCIUM mg/dL 8 9 9 6   MAGNESIUM mg/dL  --  2 5     Results from last 7 days   Lab Units 12/20/22  1720   AST U/L 16   ALT U/L 16   ALK PHOS U/L 62   TOTAL PROTEIN g/dL 8 0   ALBUMIN g/dL 4 3   TOTAL BILIRUBIN mg/dL 0 43         Results from last 7 days   Lab Units 12/21/22  0531 12/20/22  1720   GLUCOSE RANDOM mg/dL 197* 74         Results from last 7 days   Lab Units 12/20/22  2140 12/20/22  1928 12/20/22  1720   HS TNI 0HR ng/L  --   --  50*   HS TNI 2HR ng/L  --  53*  --    HSTNI D2 ng/L  --  3  --    HS TNI 4HR ng/L 45  --   --    HSTNI D4 ng/L -5  --   --          Results from last 7 days   Lab Units 12/20/22  1720   BNP pg/mL 2,056*         Results from last 7 days   Lab Units 12/20/22  2227   CLARITY UA  Clear   COLOR UA  Yellow   SPEC GRAV UA  1 027   PH UA  5 5   GLUCOSE UA mg/dl Negative   KETONES UA mg/dl Negative   BLOOD UA  Negative   PROTEIN UA mg/dl Trace*   NITRITE UA  Negative BILIRUBIN UA  Negative   UROBILINOGEN UA (BE) mg/dl <2 0   LEUKOCYTES UA  Small*   WBC UA /hpf 10-20*   RBC UA /hpf 1-2   BACTERIA UA /hpf Occasional   EPITHELIAL CELLS WET PREP /hpf Occasional         ED Treatment:   Medication Administration from 12/20/2022 1629 to 12/20/2022 1840       Date/Time Order Dose Route Action     12/20/2022 1728 EST diltiazem (CARDIZEM) injection 20 mg 20 mg Intravenous Given     12/20/2022 1729 EST diltiazem (CARDIZEM) 125 mg in sodium chloride 0 9 % 125 mL infusion 5 mg/hr Intravenous New Bag        Past Medical History:   Diagnosis Date   • ACS (acute coronary syndrome) ([place] [id number] ) 2/7/2021   • Arrhythmia    • Arthritis    • Atrial fibrillation (HCC)    • Atrial fibrillation with rapid ventricular response ([place] [address] ) 3/20/2016   • Breast lump    • CKD (chronic kidney disease) stage 3, GFR 30-59 ml/min (HCC)    • Disease of thyroid gland    • 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 ([place] [address] ) 5/25/2020   • GERD (gastroesophageal reflux disease)    • H/O transfusion 1987   • Hepatitis C     resolved   • Hepatitis C    • Hyperlipidemia    • Hypertension    • Irregular heart beat    • Pacemaker    • Sleep apnea     no cpap   • Tachycardia      Present on Admission:  • Acute on chronic heart failure with preserved ejection fraction (HCC)  • Atrial fibrillation with rapid ventricular response (HCC)  • Chest pain  • CKD (chronic kidney disease) stage 3, GFR 30-59 ml/min (HCC)  • Headache  • Hypertension, uncontrolled      Admitting Diagnosis: Atrial fibrillation ([place] [id number] ) [I48 91]  Atrial fibrillation with rapid ventricular response ([place] [address] ) [I48 91]  Age/Sex: 62 y o  female  Admission Orders:  Scheduled Medications:  amiodarone, 200 mg, Oral, Daily  amLODIPine, 5 mg, Oral, BID  doxazosin, 2 mg, Oral, HS  furosemide, 40 mg, Intravenous, BID (diuretic)  metoprolol succinate, 150 mg, Oral, BID  nicotine, 1 patch, Transdermal, Daily  pantoprazole, 40 mg, Oral, Daily Before Breakfast  rivaroxaban, 15 mg, Oral, QPM      Continuous IV Infusions:     PRN Meds:  acetaminophen, 650 mg, Oral, Q6H PRN        IP CONSULT TO NUTRITION SERVICES  IP CONSULT TO CARDIOLOGY    Network Utilization Review Department  ATTENTION: Please call with any questions or concerns to [phone] and carefully listen to the prompts so that you are directed to the right person  All voicemails are confidential   Eden Bueno all requests for admission clinical reviews, approved or denied determinations and any other requests to dedicated fax number below belonging to the campus where the patient is receiving treatment   List of dedicated fax numbers for the Facilities:  [address] DENIALS (Administrative/Medical Necessity) [phone]   [address] (Maternity/NICU/Pediatrics) [phone]   5 Haley Bray [phone]   Omar Kramer 77 [phone]   [address] [place] [address] Zehra Aquino 28 [phone]   [facility] Francis Yang [facility] 134 8[address] [facility] [phone]

## 2022-12-21 NOTE — ASSESSMENT & PLAN NOTE
· Presented to the ER with complaints of intermittent substernal chest pain with dyspnea on exertion    · Troponin levels remained flat x 3  · Likely in setting of CHF exacerbation/A-Fib with RVR  · Cardiology consult, appreciate input

## 2022-12-21 NOTE — ASSESSMENT & PLAN NOTE
Lab Results   Component Value Date    EGFR 29 12/21/2022    EGFR 30 12/20/2022    EGFR 31 11/06/2022    CREATININE 1 88 (H) 12/21/2022    CREATININE 1 83 (H) 12/20/2022    CREATININE 1 79 (H) 11/06/2022     · Creatinine at baseline 1 8-1 9  · Monitor closely with diuresis

## 2022-12-21 NOTE — ASSESSMENT & PLAN NOTE
· Patient with history of headaches; present on admission  · Unable to give Toradol in setting of GFR  · Can give migraine cocktail with Solu-Medrol if headache continues

## 2022-12-21 NOTE — ASSESSMENT & PLAN NOTE
· Pt with afib with RVR HR 130s in the ER in the setting of acute CHF exacerbation   · Has defibrillator and hx of ablation 06/22  · HR resolved after cardizem bolus in the ED  · Continue Xarelto   · Continue amiodarone and metoprolol   · Monitor on tele

## 2022-12-22 VITALS
SYSTOLIC BLOOD PRESSURE: 165 MMHG | HEART RATE: 63 BPM | WEIGHT: 234.57 LBS | BODY MASS INDEX: 36.82 KG/M2 | OXYGEN SATURATION: 95 % | DIASTOLIC BLOOD PRESSURE: 99 MMHG | TEMPERATURE: 97.4 F | RESPIRATION RATE: 17 BRPM | HEIGHT: 67 IN

## 2022-12-22 LAB
ATRIAL RATE: 131 BPM
QRS AXIS: -61 DEGREES
QRSD INTERVAL: 166 MS
QT INTERVAL: 380 MS
QTC INTERVAL: 544 MS
T WAVE AXIS: 120 DEGREES
VENTRICULAR RATE: 123 BPM

## 2022-12-22 RX ORDER — BUTALBITAL, ACETAMINOPHEN AND CAFFEINE 50; 325; 40 MG/1; MG/1; MG/1
1 TABLET ORAL ONCE
Status: COMPLETED | OUTPATIENT
Start: 2022-12-22 | End: 2022-12-22

## 2022-12-22 RX ORDER — BUTALBITAL, ACETAMINOPHEN AND CAFFEINE 300; 40; 50 MG/1; MG/1; MG/1
1 CAPSULE ORAL EVERY 12 HOURS PRN
Qty: 10 CAPSULE | Refills: 0 | Status: SHIPPED | OUTPATIENT
Start: 2022-12-22 | End: 2023-01-01

## 2022-12-22 RX ADMIN — BUTALBITAL, ACETAMINOPHEN AND CAFFEINE 1 TABLET: 50; 325; 40 TABLET ORAL at 10:57

## 2022-12-22 RX ADMIN — AMLODIPINE BESYLATE 5 MG: 5 TABLET ORAL at 07:53

## 2022-12-22 RX ADMIN — FUROSEMIDE 40 MG: 40 TABLET ORAL at 07:53

## 2022-12-22 RX ADMIN — OXYCODONE HYDROCHLORIDE AND ACETAMINOPHEN 1 TABLET: 5; 325 TABLET ORAL at 05:52

## 2022-12-22 RX ADMIN — NICOTINE 1 PATCH: 7 PATCH, EXTENDED RELEASE TRANSDERMAL at 07:54

## 2022-12-22 RX ADMIN — AMIODARONE HYDROCHLORIDE 200 MG: 200 TABLET ORAL at 07:53

## 2022-12-22 RX ADMIN — Medication 400 MG: at 10:57

## 2022-12-22 RX ADMIN — METOPROLOL SUCCINATE 150 MG: 100 TABLET, EXTENDED RELEASE ORAL at 07:52

## 2022-12-22 RX ADMIN — PANTOPRAZOLE SODIUM 40 MG: 40 TABLET, DELAYED RELEASE ORAL at 05:52

## 2022-12-22 NOTE — ASSESSMENT & PLAN NOTE
Wt Readings from Last 3 Encounters:   12/22/22 106 kg (234 lb 9 1 oz)   11/06/22 102 kg (224 lb 9 6 oz)   10/13/22 101 kg (223 lb 1 7 oz)      · Patient presented to the ED with complaints of palpitations, chest pain, shortness of breath with dyspnea on exertion  · Chest x-ray (12/20/22): Pulmonary vascular congestion with a BNP of 2056  · Patient typically takes 40 mg PO Lasix daily at home; given 40 mg IV Lasix BID here  · Monitor strict I&O's; Daily standing weights  · Follow low salt diet  · Cardiology consult appreciated  · Repeat ECHO stable  · Ok for discharge per cards   · Transitioned back to oral Lasix

## 2022-12-22 NOTE — ASSESSMENT & PLAN NOTE
· Patient with history of A-Fib; Presented to the ER in RVR with heart rate in the 130s  · Likely exacerbated by acute CHF exacerbation  · Noted to have undergone an ablation in June 2022  · Given Cardizem bolus in ER;  Heart rate now controlled, 60-70s  · Continue home medications of Amiodarone/Metoprolol   · Continue Xarelto for [facility]

## 2022-12-22 NOTE — DISCHARGE SUMMARY
[facility]  Discharge- Nita Ion 1965, 62 y o  female MRN: [id number]  Unit/Bed#: S -01 Encounter: [id number]  Primary Care Provider: Wilner De Anda MD   Date and time admitted to hospital: 12/20/2022  4:54 PM    * Acute on chronic heart failure with preserved ejection fraction [facility])  Assessment & Plan  Wt Readings from Last 3 Encounters:   12/22/22 106 kg (234 lb 9 1 oz)   11/06/22 102 kg (224 lb 9 6 oz)   10/13/22 101 kg (223 lb 1 7 oz)      · Patient presented to the ED with complaints of palpitations, chest pain, shortness of breath with dyspnea on exertion  · Chest x-ray (12/20/22): Pulmonary vascular congestion with a BNP of 2056  · Patient typically takes 40 mg PO Lasix daily at home; given 40 mg IV Lasix BID here  · Monitor strict I&O's; Daily standing weights  · Follow low salt diet  · Cardiology consult appreciated  · Repeat ECHO stable  · Ok for discharge per cards   · Transitioned back to oral Lasix     Hypertension, uncontrolled  Assessment & Plan  · Patient with history of hypertension  · Uncontrolled on admission; 203/116  · Now resolved; continue home medication regimen  · Monitor closely     Atrial fibrillation with rapid ventricular response ([facility])  Assessment & Plan  · Patient with history of A-Fib; Presented to the ER in RVR with heart rate in the 130s  · Likely exacerbated by acute CHF exacerbation  · Noted to have undergone an ablation in June 2022  · Given Cardizem bolus in ER;  Heart rate now controlled, 60-70s  · Continue home medications of Amiodarone/Metoprolol   · Continue Xarelto for [facility]    Headache  Assessment & Plan  · Patient with history of headaches; present on admission  · Unable to give Toradol in setting of GFR  · Trialed Mag and Fioricet   · Improving     CKD (chronic kidney disease) stage 3, GFR 30-59 ml/min [facility])  Assessment & Plan  Lab Results   Component Value Date    EGFR 29 12/21/2022    EGFR 30 12/20/2022    EGFR 31 11/06/2022 CREATININE 1 88 (H) 12/21/2022    CREATININE 1 83 (H) 12/20/2022    CREATININE 1 79 (H) 11/06/2022     Chronic, baseline creatinine noted to be 1 6-1 8  Currently within baseline  Avoid nephrotoxins, hypotension    Chest pain  Assessment & Plan  · Presented to the ER with complaints of intermittent substernal chest pain with dyspnea on exertion  · Troponin levels remained flat x 3  · Likely in setting of CHF exacerbation/A-Fib with RVR  · Cardiology consult, appreciate input    Medical Problems     Resolved Problems  Date Reviewed: 12/22/2022   None       Discharging Physician / Practitioner: CHARLOTTE Terrell  PCP: Jeremie Chacko MD  Admission Date:   Admission Orders (From admission, onward)     Ordered        12/20/22 1811  INPATIENT ADMISSION  Once                      Discharge Date: 12/22/22    Consultations During Hospital Stay:  · Cardiology     Procedures Performed:   · CXR: Stable cardiomegaly  No active pulmonary disease  Significant Findings / Test Results:   · Acute on Chronic heart failure     Incidental Findings:   · None     Test Results Pending at Discharge (will require follow up): · None      Outpatient Tests Requested:  · None     Complications:  None     Reason for Admission: shortness of breath, palpitations     Hospital Course:   Gerardo Randle is a 62 y o  female patient who originally presented to the hospital on 12/20/2022 due to shortness of breath, chest pain, palpitations, and headache  She was noted to have AFiv with RVR  Patient was treated with IV Lasix  She was seen by cardiology  She converted to sinus rhythm  She had an update echocardiogram which showed no change  Cardiology cleared her for discharge home back on her home regimen  Patient has a cardiology follow-up appointment in January  Please see above list of diagnoses and related plan for additional information       Condition at Discharge: stable    Discharge Day Visit / Exam:   Subjective: Patient seen and examined at bedside  She reports feeling improved  No shortness of breath, chest pain, palpitations  Desires discharge home  Vitals: Blood Pressure: 165/99 (12/22/22 0705)  Pulse: 63 (12/22/22 0705)  Temperature: (!) 97 4 °F (36 3 °C) (12/22/22 0705)  Temp Source: Oral (12/20/22 1652)  Respirations: 17 (12/22/22 0705)  Height: 5' 7" (170 2 cm) (12/21/22 1525)  Weight - Scale: 106 kg (234 lb 9 1 oz) (12/22/22 0600)  SpO2: 92 % (12/22/22 0705)  Exam:   Physical Exam  Vitals and nursing note reviewed  Constitutional:       General: She is not in acute distress  Appearance: She is not toxic-appearing or diaphoretic  HENT:      Head: Normocephalic  Mouth/Throat:      Mouth: Mucous membranes are moist    Eyes:      Conjunctiva/sclera: Conjunctivae normal    Cardiovascular:      Rate and Rhythm: Normal rate  Pulmonary:      Effort: Pulmonary effort is normal       Breath sounds: Normal breath sounds  No wheezing, rhonchi or rales  Abdominal:      General: Bowel sounds are normal       Palpations: Abdomen is soft  Musculoskeletal:         General: Normal range of motion  Cervical back: Normal range of motion  Right lower leg: No edema  Left lower leg: No edema  Skin:     General: Skin is warm and dry  Capillary Refill: Capillary refill takes less than 2 seconds  Neurological:      Mental Status: She is alert and oriented to person, place, and time  Mental status is at baseline  Psychiatric:         Mood and Affect: Mood normal          Behavior: Behavior normal          Thought Content: Thought content normal           Discussion with Family: Patient declined call to   Discharge instructions/Information to patient and family:   See after visit summary for information provided to patient and family  Provisions for Follow-Up Care:  See after visit summary for information related to follow-up care and any pertinent home health orders  Disposition:   Home    Planned Readmission: None      Discharge Statement:  I spent > 30 minutes discharging the patient  This time was spent on the day of discharge  I had direct contact with the patient on the day of discharge  Greater than 50% of the total time was spent examining patient, answering all patient questions, arranging and discussing plan of care with patient as well as directly providing post-discharge instructions  Additional time then spent on discharge activities  Discharge Medications:  See after visit summary for reconciled discharge medications provided to patient and/or family        **Please Note: This note may have been constructed using a voice recognition system**

## 2022-12-22 NOTE — ASSESSMENT & PLAN NOTE
· Patient with history of headaches; present on admission  · Unable to give Toradol in setting of GFR  · Trialed Mag and Fioricet   · Improving

## 2022-12-22 NOTE — ASSESSMENT & PLAN NOTE
Lab Results   Component Value Date    EGFR 29 12/21/2022    EGFR 30 12/20/2022    EGFR 31 11/06/2022    CREATININE 1 88 (H) 12/21/2022    CREATININE 1 83 (H) 12/20/2022    CREATININE 1 79 (H) 11/06/2022     Chronic, baseline creatinine noted to be 1 6-1 8  Currently within baseline  Avoid nephrotoxins, hypotension

## 2022-12-23 LAB — BACTERIA UR CULT: NORMAL

## 2022-12-23 NOTE — UTILIZATION REVIEW
NOTIFICATION OF ADMISSION DISCHARGE   This is a Notification of Discharge from [facility]  Please be advised that this patient has been discharge from our facility  Below you will find the admission and discharge date and time including the patient’s disposition  UTILIZATION REVIEW CONTACT:  Nadja Sal MA  Utilization   Network Utilization Review Department  Phone: [phone] x carefully listen to the prompts  All voicemails are confidential   Email: [email or web address]     ADMISSION INFORMATION  PRESENTATION DATE: 12/20/2022  4:54 PM  OBERVATION ADMISSION DATE:   INPATIENT ADMISSION DATE: 12/20/22  6:11 PM   DISCHARGE DATE: 12/22/2022  1:49 PM   DISPOSITION:Home/Self Care    IMPORTANT INFORMATION:  Send all requests for admission clinical reviews, approved or denied determinations and any other requests to dedicated fax number below belonging to the campus where the patient is receiving treatment   List of dedicated fax numbers:  [address] DENIALS (Administrative/Medical Necessity) [phone]   [address] (Maternity/NICU/Pediatrics) [phone]   [facility] [phone]   [facility] 87 [phone]   [address] [phone]   [address] [facility] [phone]   90 [facility] [phone]   [address] [place], [place] [address] [address] [phone]   [facility]  [phone]   [address] [facility] [phone]   [address] [facility] [address] [facility] [phone]

## 2023-01-04 ENCOUNTER — APPOINTMENT (EMERGENCY)
Dept: RADIOLOGY | Facility: HOSPITAL | Age: 58
End: 2023-01-04

## 2023-01-04 ENCOUNTER — HOSPITAL ENCOUNTER (EMERGENCY)
Facility: HOSPITAL | Age: 58
Discharge: HOME | End: 2023-01-04
Attending: EMERGENCY MEDICINE

## 2023-01-04 VITALS
OXYGEN SATURATION: 95 % | WEIGHT: 234 LBS | DIASTOLIC BLOOD PRESSURE: 83 MMHG | BODY MASS INDEX: 36.65 KG/M2 | HEART RATE: 60 BPM | TEMPERATURE: 98.1 F | RESPIRATION RATE: 22 BRPM | SYSTOLIC BLOOD PRESSURE: 173 MMHG

## 2023-01-04 DIAGNOSIS — R10.32 LEFT LOWER QUADRANT ABDOMINAL PAIN: ICD-10-CM

## 2023-01-04 DIAGNOSIS — R03.0 ELEVATED BLOOD PRESSURE READING: ICD-10-CM

## 2023-01-04 DIAGNOSIS — R07.9 CHEST PAIN: Primary | ICD-10-CM

## 2023-01-04 LAB
2HR DELTA HS TROPONIN: -4 NG/L
ALBUMIN SERPL BCP-MCNC: 3.8 G/DL (ref 3.5–5)
ALP SERPL-CCNC: 60 U/L (ref 46–116)
ALT SERPL W P-5'-P-CCNC: 24 U/L (ref 12–78)
ANION GAP SERPL CALCULATED.3IONS-SCNC: 4 MMOL/L (ref 4–13)
AST SERPL W P-5'-P-CCNC: 18 U/L (ref 5–45)
ATRIAL RATE: 60 BPM
ATRIAL RATE: 60 BPM
BASOPHILS # BLD AUTO: 0.01 THOUSANDS/ÂΜL (ref 0–0.1)
BASOPHILS NFR BLD AUTO: 0 % (ref 0–1)
BILIRUB SERPL-MCNC: 0.41 MG/DL (ref 0.2–1)
BUN SERPL-MCNC: 22 MG/DL (ref 5–25)
CALCIUM SERPL-MCNC: 9 MG/DL (ref 8.3–10.1)
CARDIAC TROPONIN I PNL SERPL HS: 42 NG/L
CARDIAC TROPONIN I PNL SERPL HS: 46 NG/L
CHLORIDE SERPL-SCNC: 108 MMOL/L (ref 96–108)
CO2 SERPL-SCNC: 30 MMOL/L (ref 21–32)
CREAT SERPL-MCNC: 1.7 MG/DL (ref 0.6–1.3)
EOSINOPHIL # BLD AUTO: 0.06 THOUSAND/ÂΜL (ref 0–0.61)
EOSINOPHIL NFR BLD AUTO: 2 % (ref 0–6)
ERYTHROCYTE [DISTWIDTH] IN BLOOD BY AUTOMATED COUNT: 14.6 % (ref 11.6–15.1)
FLUAV RNA RESP QL NAA+PROBE: NEGATIVE
FLUBV RNA RESP QL NAA+PROBE: NEGATIVE
GFR SERPL CREATININE-BSD FRML MDRD: 33 ML/MIN/1.73SQ M
GLUCOSE SERPL-MCNC: 91 MG/DL (ref 65–140)
HCT VFR BLD AUTO: 37.1 % (ref 34.8–46.1)
HGB BLD-MCNC: 11.1 G/DL (ref 11.5–15.4)
IMM GRANULOCYTES # BLD AUTO: 0.01 THOUSAND/UL (ref 0–0.2)
IMM GRANULOCYTES NFR BLD AUTO: 0 % (ref 0–2)
LYMPHOCYTES # BLD AUTO: 0.81 THOUSANDS/ÂΜL (ref 0.6–4.47)
LYMPHOCYTES NFR BLD AUTO: 21 % (ref 14–44)
MCH RBC QN AUTO: 25.1 PG (ref 26.8–34.3)
MCHC RBC AUTO-ENTMCNC: 29.9 G/DL (ref 31.4–37.4)
MCV RBC AUTO: 84 FL (ref 82–98)
MONOCYTES # BLD AUTO: 0.3 THOUSAND/ÂΜL (ref 0.17–1.22)
MONOCYTES NFR BLD AUTO: 8 % (ref 4–12)
NEUTROPHILS # BLD AUTO: 2.68 THOUSANDS/ÂΜL (ref 1.85–7.62)
NEUTS SEG NFR BLD AUTO: 69 % (ref 43–75)
NRBC BLD AUTO-RTO: 0 /100 WBCS
NT-PROBNP SERPL-MCNC: 5423 PG/ML
P AXIS: 90 DEGREES
P AXIS: 90 DEGREES
PLATELET # BLD AUTO: 141 THOUSANDS/UL (ref 149–390)
PMV BLD AUTO: 11.6 FL (ref 8.9–12.7)
POTASSIUM SERPL-SCNC: 4.2 MMOL/L (ref 3.5–5.3)
PR INTERVAL: 182 MS
PR INTERVAL: 184 MS
PROT SERPL-MCNC: 7.6 G/DL (ref 6.4–8.4)
QRS AXIS: -55 DEGREES
QRS AXIS: -55 DEGREES
QRSD INTERVAL: 178 MS
QRSD INTERVAL: 178 MS
QT INTERVAL: 502 MS
QT INTERVAL: 504 MS
QTC INTERVAL: 502 MS
QTC INTERVAL: 504 MS
RBC # BLD AUTO: 4.42 MILLION/UL (ref 3.81–5.12)
RSV RNA RESP QL NAA+PROBE: NEGATIVE
SARS-COV-2 RNA RESP QL NAA+PROBE: NEGATIVE
SODIUM SERPL-SCNC: 142 MMOL/L (ref 135–147)
T WAVE AXIS: 121 DEGREES
T WAVE AXIS: 121 DEGREES
VENTRICULAR RATE: 60 BPM
VENTRICULAR RATE: 60 BPM
WBC # BLD AUTO: 3.87 THOUSAND/UL (ref 4.31–10.16)

## 2023-01-04 RX ORDER — HYDROMORPHONE HCL IN WATER/PF 6 MG/30 ML
0.2 PATIENT CONTROLLED ANALGESIA SYRINGE INTRAVENOUS ONCE
Status: COMPLETED | OUTPATIENT
Start: 2023-01-04 | End: 2023-01-04

## 2023-01-04 RX ADMIN — HYDROMORPHONE HYDROCHLORIDE 0.2 MG: 0.2 INJECTION, SOLUTION INTRAMUSCULAR; INTRAVENOUS; SUBCUTANEOUS at 16:25
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
## 2023-01-04 NOTE — DISCHARGE INSTRUCTIONS
Your blood pressure was elevated in the emergency department today  You should discuss this with your doctor when seen for follow-up  The cause of your symptoms is unclear, but unlikely to be an emergency at this time  It is important that you follow up as instructed and watch for any changes in your condition  You should return to the nearest emergency department, if you develop different type of chest pain, chest pain that is occurring more frequently, become much more short of breath when you lay flat, or are concerned about anything else    Acute Abdominal Pain     DISCHARGE INSTRUCTIONS:   Return to the emergency department if:   Your abdominal pain changes, gets worse or still there in  24 hours  You vomit blood or cannot stop vomiting  You have blood in your bowel movement or it looks like tar  You have bleeding from your rectum  Your abdomen is larger than usual, more painful, and hard  You stop passing gas and having bowel movements  You feel weak, dizzy, or faint    You are concerned about anything else

## 2023-01-12 NOTE — PROGRESS NOTES
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 hours   5  6  CKD III baseline creat 1 6 - 1 8 starting Lisinopril today, BMP in one week   6   Tobacco - continues to smoke 1/2 pack of cigarettes a day, encouraged smoking cessation

## 2023-01-13 ENCOUNTER — HOSPITAL ENCOUNTER (INPATIENT)
Facility: HOSPITAL | Age: 58
LOS: 2 days | Discharge: HOME | End: 2023-01-15
Attending: EMERGENCY MEDICINE | Admitting: INTERNAL MEDICINE

## 2023-01-13 ENCOUNTER — APPOINTMENT (EMERGENCY)
Dept: RADIOLOGY | Facility: HOSPITAL | Age: 58
End: 2023-01-13

## 2023-01-13 ENCOUNTER — OFFICE VISIT (OUTPATIENT)
Dept: CARDIOLOGY CLINIC | Facility: CLINIC | Age: 58
End: 2023-01-13

## 2023-01-13 VITALS
WEIGHT: 237 LBS | DIASTOLIC BLOOD PRESSURE: 74 MMHG | BODY MASS INDEX: 37.12 KG/M2 | HEART RATE: 123 BPM | SYSTOLIC BLOOD PRESSURE: 132 MMHG

## 2023-01-13 DIAGNOSIS — I48.91 ATRIAL FIBRILLATION WITH RAPID VENTRICULAR RESPONSE (HCC): Primary | ICD-10-CM

## 2023-01-13 DIAGNOSIS — I50.9 CHF EXACERBATION (HCC): ICD-10-CM

## 2023-01-13 DIAGNOSIS — I50.43 ACUTE ON CHRONIC COMBINED SYSTOLIC AND DIASTOLIC HEART FAILURE (HCC): ICD-10-CM

## 2023-01-13 DIAGNOSIS — I50.9 ACUTE EXACERBATION OF CHF (CONGESTIVE HEART FAILURE) (HCC): ICD-10-CM

## 2023-01-13 DIAGNOSIS — R11.2 NAUSEA AND VOMITING, UNSPECIFIED VOMITING TYPE: ICD-10-CM

## 2023-01-13 DIAGNOSIS — I10 HYPERTENSION, UNCONTROLLED: ICD-10-CM

## 2023-01-13 DIAGNOSIS — R06.02 SOB (SHORTNESS OF BREATH): ICD-10-CM

## 2023-01-13 LAB
2HR DELTA HS TROPONIN: 2 NG/L
ALBUMIN SERPL BCP-MCNC: 3.4 G/DL (ref 3.5–5)
ALP SERPL-CCNC: 69 U/L (ref 46–116)
ALT SERPL W P-5'-P-CCNC: 22 U/L (ref 12–78)
ANION GAP SERPL CALCULATED.3IONS-SCNC: 5 MMOL/L (ref 4–13)
AST SERPL W P-5'-P-CCNC: 18 U/L (ref 5–45)
BASOPHILS # BLD AUTO: 0.02 THOUSANDS/ÂΜL (ref 0–0.1)
BASOPHILS NFR BLD AUTO: 0 % (ref 0–1)
BILIRUB SERPL-MCNC: 0.34 MG/DL (ref 0.2–1)
BUN SERPL-MCNC: 26 MG/DL (ref 5–25)
CALCIUM ALBUM COR SERPL-MCNC: 9.2 MG/DL (ref 8.3–10.1)
CALCIUM SERPL-MCNC: 8.7 MG/DL (ref 8.3–10.1)
CARDIAC TROPONIN I PNL SERPL HS: 36 NG/L
CARDIAC TROPONIN I PNL SERPL HS: 38 NG/L
CHLORIDE SERPL-SCNC: 109 MMOL/L (ref 96–108)
CO2 SERPL-SCNC: 27 MMOL/L (ref 21–32)
CREAT SERPL-MCNC: 2.01 MG/DL (ref 0.6–1.3)
EOSINOPHIL # BLD AUTO: 0.04 THOUSAND/ÂΜL (ref 0–0.61)
EOSINOPHIL NFR BLD AUTO: 1 % (ref 0–6)
ERYTHROCYTE [DISTWIDTH] IN BLOOD BY AUTOMATED COUNT: 14.7 % (ref 11.6–15.1)
FLUAV RNA RESP QL NAA+PROBE: NEGATIVE
FLUBV RNA RESP QL NAA+PROBE: NEGATIVE
GFR SERPL CREATININE-BSD FRML MDRD: 26 ML/MIN/1.73SQ M
GLUCOSE SERPL-MCNC: 141 MG/DL (ref 65–140)
HCT VFR BLD AUTO: 33.9 % (ref 34.8–46.1)
HGB BLD-MCNC: 10.5 G/DL (ref 11.5–15.4)
IMM GRANULOCYTES # BLD AUTO: 0.01 THOUSAND/UL (ref 0–0.2)
IMM GRANULOCYTES NFR BLD AUTO: 0 % (ref 0–2)
INR PPP: 1.1 (ref 0.84–1.19)
LYMPHOCYTES # BLD AUTO: 0.91 THOUSANDS/ÂΜL (ref 0.6–4.47)
LYMPHOCYTES NFR BLD AUTO: 19 % (ref 14–44)
MCH RBC QN AUTO: 25.5 PG (ref 26.8–34.3)
MCHC RBC AUTO-ENTMCNC: 31 G/DL (ref 31.4–37.4)
MCV RBC AUTO: 82 FL (ref 82–98)
MONOCYTES # BLD AUTO: 0.29 THOUSAND/ÂΜL (ref 0.17–1.22)
MONOCYTES NFR BLD AUTO: 6 % (ref 4–12)
NEUTROPHILS # BLD AUTO: 3.53 THOUSANDS/ÂΜL (ref 1.85–7.62)
NEUTS SEG NFR BLD AUTO: 74 % (ref 43–75)
NRBC BLD AUTO-RTO: 0 /100 WBCS
NT-PROBNP SERPL-MCNC: 9053 PG/ML
PLATELET # BLD AUTO: 178 THOUSANDS/UL (ref 149–390)
PMV BLD AUTO: 11.9 FL (ref 8.9–12.7)
POTASSIUM SERPL-SCNC: 3.9 MMOL/L (ref 3.5–5.3)
PROT SERPL-MCNC: 7.2 G/DL (ref 6.4–8.4)
PROTHROMBIN TIME: 14.4 SECONDS (ref 11.6–14.5)
RBC # BLD AUTO: 4.12 MILLION/UL (ref 3.81–5.12)
RSV RNA RESP QL NAA+PROBE: NEGATIVE
SARS-COV-2 RNA RESP QL NAA+PROBE: NEGATIVE
SODIUM SERPL-SCNC: 141 MMOL/L (ref 135–147)
WBC # BLD AUTO: 4.8 THOUSAND/UL (ref 4.31–10.16)

## 2023-01-13 RX ORDER — FUROSEMIDE 40 MG/1
40 TABLET ORAL DAILY
Status: DISCONTINUED | OUTPATIENT
Start: 2023-01-14 | End: 2023-01-14

## 2023-01-13 RX ORDER — OXYCODONE HYDROCHLORIDE AND ACETAMINOPHEN 5; 325 MG/1; MG/1
1 TABLET ORAL ONCE
Status: COMPLETED | OUTPATIENT
Start: 2023-01-13 | End: 2023-01-13

## 2023-01-13 RX ORDER — AMIODARONE HYDROCHLORIDE 200 MG/1
200 TABLET ORAL DAILY
Status: DISCONTINUED | OUTPATIENT
Start: 2023-01-14 | End: 2023-01-15

## 2023-01-13 RX ORDER — DIAZEPAM 5 MG/ML
2.5 INJECTION, SOLUTION INTRAMUSCULAR; INTRAVENOUS ONCE
Status: COMPLETED | OUTPATIENT
Start: 2023-01-13 | End: 2023-01-13

## 2023-01-13 RX ORDER — OXYCODONE HYDROCHLORIDE AND ACETAMINOPHEN 5; 325 MG/1; MG/1
1 TABLET ORAL EVERY 6 HOURS PRN
Status: DISCONTINUED | OUTPATIENT
Start: 2023-01-13 | End: 2023-01-15 | Stop reason: HOSPADM

## 2023-01-13 RX ORDER — FUROSEMIDE 10 MG/ML
INJECTION INTRAMUSCULAR; INTRAVENOUS
Status: COMPLETED
Start: 2023-01-13 | End: 2023-01-13

## 2023-01-13 RX ORDER — PANTOPRAZOLE SODIUM 40 MG/1
40 TABLET, DELAYED RELEASE ORAL DAILY
Qty: 30 TABLET | Refills: 2 | Status: CANCELLED | OUTPATIENT
Start: 2023-01-13

## 2023-01-13 RX ORDER — BUTALBITAL, ACETAMINOPHEN AND CAFFEINE 50; 325; 40 MG/1; MG/1; MG/1
1 TABLET ORAL EVERY 4 HOURS PRN
Status: DISCONTINUED | OUTPATIENT
Start: 2023-01-13 | End: 2023-01-15 | Stop reason: HOSPADM

## 2023-01-13 RX ORDER — FUROSEMIDE 40 MG/1
40 TABLET ORAL DAILY
Qty: 30 TABLET | Refills: 0 | Status: CANCELLED | OUTPATIENT
Start: 2023-01-13 | End: 2023-02-12

## 2023-01-13 RX ORDER — SODIUM CHLORIDE, SODIUM GLUCONATE, SODIUM ACETATE, POTASSIUM CHLORIDE, MAGNESIUM CHLORIDE, SODIUM PHOSPHATE, DIBASIC, AND POTASSIUM PHOSPHATE .53; .5; .37; .037; .03; .012; .00082 G/100ML; G/100ML; G/100ML; G/100ML; G/100ML; G/100ML; G/100ML
500 INJECTION, SOLUTION INTRAVENOUS ONCE
Status: COMPLETED | OUTPATIENT
Start: 2023-01-13 | End: 2023-01-13

## 2023-01-13 RX ORDER — AMLODIPINE BESYLATE 5 MG/1
5 TABLET ORAL 2 TIMES DAILY
Status: DISCONTINUED | OUTPATIENT
Start: 2023-01-13 | End: 2023-01-15 | Stop reason: HOSPADM

## 2023-01-13 RX ORDER — NITROGLYCERIN 20 MG/100ML
5-200 INJECTION INTRAVENOUS
Status: DISCONTINUED | OUTPATIENT
Start: 2023-01-13 | End: 2023-01-15

## 2023-01-13 RX ORDER — NICOTINE 21 MG/24HR
1 PATCH, TRANSDERMAL 24 HOURS TRANSDERMAL DAILY
Status: DISCONTINUED | OUTPATIENT
Start: 2023-01-14 | End: 2023-01-15 | Stop reason: HOSPADM

## 2023-01-13 RX ORDER — METOPROLOL TARTRATE 50 MG/1
50 TABLET, FILM COATED ORAL ONCE
Status: COMPLETED | OUTPATIENT
Start: 2023-01-13 | End: 2023-01-13

## 2023-01-13 RX ORDER — DILTIAZEM HYDROCHLORIDE 5 MG/ML
20 INJECTION INTRAVENOUS ONCE
Status: COMPLETED | OUTPATIENT
Start: 2023-01-13 | End: 2023-01-13

## 2023-01-13 RX ORDER — DOXAZOSIN 2 MG/1
2 TABLET ORAL
Status: DISCONTINUED | OUTPATIENT
Start: 2023-01-13 | End: 2023-01-15 | Stop reason: HOSPADM

## 2023-01-13 RX ORDER — PANTOPRAZOLE SODIUM 40 MG/1
40 TABLET, DELAYED RELEASE ORAL DAILY
Status: DISCONTINUED | OUTPATIENT
Start: 2023-01-14 | End: 2023-01-15 | Stop reason: HOSPADM

## 2023-01-13 RX ADMIN — AMLODIPINE BESYLATE 5 MG: 5 TABLET ORAL at 20:43

## 2023-01-13 RX ADMIN — OXYCODONE HYDROCHLORIDE AND ACETAMINOPHEN 1 TABLET: 5; 325 TABLET ORAL at 16:30

## 2023-01-13 RX ADMIN — DILTIAZEM HYDROCHLORIDE 20 MG: 5 INJECTION INTRAVENOUS at 13:53

## 2023-01-13 RX ADMIN — SODIUM CHLORIDE, SODIUM GLUCONATE, SODIUM ACETATE, POTASSIUM CHLORIDE, MAGNESIUM CHLORIDE, SODIUM PHOSPHATE, DIBASIC, AND POTASSIUM PHOSPHATE 500 ML: .53; .5; .37; .037; .03; .012; .00082 INJECTION, SOLUTION INTRAVENOUS at 14:33

## 2023-01-13 RX ADMIN — METOPROLOL SUCCINATE 150 MG: 100 TABLET, EXTENDED RELEASE ORAL at 20:43

## 2023-01-13 RX ADMIN — NITROGLYCERIN 10 MCG/MIN: 20 INJECTION INTRAVENOUS at 16:17

## 2023-01-13 RX ADMIN — RIVAROXABAN 15 MG: 15 TABLET, FILM COATED ORAL at 20:59

## 2023-01-13 RX ADMIN — FUROSEMIDE 40 MG: 10 INJECTION, SOLUTION INTRAMUSCULAR; INTRAVENOUS at 16:45

## 2023-01-13 RX ADMIN — DIAZEPAM 2.5 MG: 10 INJECTION, SOLUTION INTRAMUSCULAR; INTRAVENOUS at 13:57

## 2023-01-13 RX ADMIN — OXYCODONE HYDROCHLORIDE AND ACETAMINOPHEN 1 TABLET: 5; 325 TABLET ORAL at 20:42

## 2023-01-13 RX ADMIN — METOPROLOL TARTRATE 50 MG: 50 TABLET ORAL at 15:40

## 2023-01-13 RX ADMIN — FUROSEMIDE 40 MG: 10 INJECTION, SOLUTION INTRAMUSCULAR; INTRAVENOUS at 17:13
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
## 2023-01-13 NOTE — ASSESSMENT & PLAN NOTE
Patient presenting with atrial fibrillation with high ventricular response    As needed IV Lopressor  Continue with home medications  Consult EP  Continue with supportive care
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
## 2023-01-13 NOTE — ED ATTENDING ATTESTATION
1/13/2023  INadja MD, saw and evaluated the patient  I have discussed the patient with the resident/non-physician practitioner and agree with the resident's/non-physician practitioner's findings, Plan of Care, and MDM as documented in the resident's/non-physician practitioner's note, except where noted  All available labs and Radiology studies were reviewed  I was present for key portions of any procedure(s) performed by the resident/non-physician practitioner and I was immediately available to provide assistance  At this point I agree with the current assessment done in the Emergency Department  I have conducted an independent evaluation of this patient a history and physical is as follows:  Pt states that she has had some mild intermittent chest pain ABDUL for one week and ha She has had ha in past and has had imaging that was negative  PE: alert tachy lungs clear abd soft nontender some peripheral edema MDM: will do cardiac gatuam slow heart rate lasix       Pt given cardiazem with rate control initally bp improved now worse nitro drip started   ED Course         Critical Care Time  CriticalCare Time  Performed by: Nadja Ramos MD  Authorized by:  Nadja Ramos MD     Critical care provider statement:     Critical care time (minutes):  33    Critical care time was exclusive of:  Separately billable procedures and treating other patients and teaching time    Critical care was necessary to treat or prevent imminent or life-threatening deterioration of the following conditions:  Cardiac failure    Critical care was time spent personally by me on the following activities:  Obtaining history from patient or surrogate, discussions with consultants, evaluation of patient's response to treatment, examination of patient, re-evaluation of patient's condition and ordering and review of laboratory studies    I assumed direction of critical care for this patient from another provider in my specialty: no
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
## 2023-01-13 NOTE — ASSESSMENT & PLAN NOTE
Lab Results   Component Value Date    EGFR 26 01/13/2023    EGFR 33 01/04/2023    EGFR 29 12/21/2022    CREATININE 2 01 (H) 01/13/2023    CREATININE 1 70 (H) 01/04/2023    CREATININE 1 88 (H) 12/21/2022   Baseline creatinine between 1 7-1 8
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
## 2023-01-14 PROBLEM — I50.32 CHRONIC DIASTOLIC (CONGESTIVE) HEART FAILURE (HCC): Status: ACTIVE | Noted: 2019-01-23

## 2023-01-14 PROBLEM — N17.9 ACUTE RENAL FAILURE SUPERIMPOSED ON STAGE 4 CHRONIC KIDNEY DISEASE (HCC): Status: ACTIVE | Noted: 2023-01-14

## 2023-01-14 PROBLEM — I50.33 ACUTE ON CHRONIC DIASTOLIC (CONGESTIVE) HEART FAILURE (HCC): Status: ACTIVE | Noted: 2019-01-23

## 2023-01-14 PROBLEM — E66.01 MORBID OBESITY (HCC): Status: ACTIVE | Noted: 2023-01-14

## 2023-01-14 PROBLEM — N18.4 ACUTE RENAL FAILURE SUPERIMPOSED ON STAGE 4 CHRONIC KIDNEY DISEASE (HCC): Status: ACTIVE | Noted: 2023-01-14

## 2023-01-14 PROBLEM — D63.8 ANEMIA OF CHRONIC DISEASE: Status: ACTIVE | Noted: 2023-01-14

## 2023-01-14 PROBLEM — Z72.0 TOBACCO ABUSE: Status: ACTIVE | Noted: 2018-12-26

## 2023-01-14 LAB
ANION GAP SERPL CALCULATED.3IONS-SCNC: 6 MMOL/L (ref 4–13)
BUN SERPL-MCNC: 25 MG/DL (ref 5–25)
CALCIUM SERPL-MCNC: 8.2 MG/DL (ref 8.3–10.1)
CHLORIDE SERPL-SCNC: 105 MMOL/L (ref 96–108)
CO2 SERPL-SCNC: 28 MMOL/L (ref 21–32)
CREAT SERPL-MCNC: 2.08 MG/DL (ref 0.6–1.3)
GFR SERPL CREATININE-BSD FRML MDRD: 25 ML/MIN/1.73SQ M
GLUCOSE SERPL-MCNC: 107 MG/DL (ref 65–140)
MAGNESIUM SERPL-MCNC: 2.4 MG/DL (ref 1.6–2.6)
POTASSIUM SERPL-SCNC: 4.4 MMOL/L (ref 3.5–5.3)
SODIUM SERPL-SCNC: 139 MMOL/L (ref 135–147)

## 2023-01-14 RX ORDER — ACETAMINOPHEN 325 MG/1
650 TABLET ORAL EVERY 6 HOURS PRN
Status: DISCONTINUED | OUTPATIENT
Start: 2023-01-14 | End: 2023-01-15 | Stop reason: HOSPADM

## 2023-01-14 RX ORDER — HYDRALAZINE HYDROCHLORIDE 20 MG/ML
5 INJECTION INTRAMUSCULAR; INTRAVENOUS EVERY 6 HOURS PRN
Status: DISCONTINUED | OUTPATIENT
Start: 2023-01-14 | End: 2023-01-15

## 2023-01-14 RX ADMIN — AMLODIPINE BESYLATE 5 MG: 5 TABLET ORAL at 18:52

## 2023-01-14 RX ADMIN — ACETAMINOPHEN 650 MG: 325 TABLET, FILM COATED ORAL at 11:47

## 2023-01-14 RX ADMIN — PANTOPRAZOLE SODIUM 40 MG: 40 TABLET, DELAYED RELEASE ORAL at 08:27

## 2023-01-14 RX ADMIN — METOPROLOL SUCCINATE 150 MG: 100 TABLET, EXTENDED RELEASE ORAL at 18:52

## 2023-01-14 RX ADMIN — OXYCODONE HYDROCHLORIDE AND ACETAMINOPHEN 1 TABLET: 5; 325 TABLET ORAL at 14:39

## 2023-01-14 RX ADMIN — DOXAZOSIN 2 MG: 2 TABLET ORAL at 21:47

## 2023-01-14 RX ADMIN — AMIODARONE HYDROCHLORIDE 200 MG: 200 TABLET ORAL at 08:27

## 2023-01-14 RX ADMIN — BUTALBITAL, ACETAMINOPHEN, AND CAFFEINE 1 TABLET: 50; 325; 40 TABLET ORAL at 05:23

## 2023-01-14 RX ADMIN — OXYCODONE HYDROCHLORIDE AND ACETAMINOPHEN 1 TABLET: 5; 325 TABLET ORAL at 08:27

## 2023-01-14 RX ADMIN — FUROSEMIDE 40 MG: 40 TABLET ORAL at 08:27

## 2023-01-14 RX ADMIN — NICOTINE 1 PATCH: 21 PATCH, EXTENDED RELEASE TRANSDERMAL at 08:28

## 2023-01-14 RX ADMIN — RIVAROXABAN 15 MG: 15 TABLET, FILM COATED ORAL at 18:52

## 2023-01-14 RX ADMIN — OXYCODONE HYDROCHLORIDE AND ACETAMINOPHEN 1 TABLET: 5; 325 TABLET ORAL at 21:47

## 2023-01-14 RX ADMIN — AMLODIPINE BESYLATE 5 MG: 5 TABLET ORAL at 08:27

## 2023-01-14 RX ADMIN — BUTALBITAL, ACETAMINOPHEN, AND CAFFEINE 1 TABLET: 50; 325; 40 TABLET ORAL at 18:56

## 2023-01-14 RX ADMIN — METOPROLOL SUCCINATE 150 MG: 100 TABLET, EXTENDED RELEASE ORAL at 08:27

## 2023-01-14 NOTE — ASSESSMENT & PLAN NOTE
Baseline creatinine of approximately 1 7-1 8 - remains elevated at 2 08 today  Monitor renal function and urine output - limit/avoid nephrotoxins as possible - avoid hypotension as possible - hold Lasix  CT imaging negative for hydronephrosis or obstructive etiology

## 2023-01-14 NOTE — CASE MANAGEMENT
Case Management Assessment & Discharge Planning Note    Patient name Haley Heck PPHP 410/PPHP [id number] MRN [id number]  : 1965 Date 2023       Current Admission Date: 2023  Current Admission Diagnosis:Atrial fibrillation with rapid ventricular response [facility])   Patient Active Problem List    Diagnosis Date Noted   • Left low back pain 2022   • Atrial flutter ([facility] [address] ) 10/13/2022   • Hypertension, uncontrolled 10/13/2022   • Combined systolic and diastolic heart failure ([facility] ) 10/13/2022   • Renal cyst 2022   • Diverticulitis of large intestine without perforation or abscess 2022   • Leucopenia 2022   • Leg edema 2022   • Benign hypertension with CKD (chronic kidney disease) stage III ([facility] ) 2022   • Nephrolithiasis 2022   • Hypertensive crisis 2022   • Electrolyte abnormality 2022   • CKD (chronic kidney disease) stage 3, GFR 30-59 ml/min ([facility] ) 2022   • Atypical atrial flutter ([facility] ) 2022   • Hepatitis C 2022   • Acute right flank pain 08/15/2021   • Epigastric abdominal tenderness 08/15/2021   • Thrombocytopenia ([facility] ) 2021   • Leg swelling 2020   • Paroxysmal A-fib ([facility] ) 2020   • Cocaine abuse ([place] [address] ) 2020   • Acute on chronic heart failure with preserved ejection fraction ([facility] ) 2020   • Elevated lipase 10/10/2019   • Palpitations 2019   • Elevated troponin 2019   • Hypertrophic cardiomyopathy ([facility] ) 2019   • Headache 2018   • Stage 4 chronic kidney disease ([facility] [address] ) 2018   • Smoking 2018   • NSTEMI (non-ST elevated myocardial infarction) ([facility] ) 2018   • ICD (implantable cardioverter-defibrillator) discharge 05/10/2017   • History of ventricular tachycardia ([address] ) with ICD 2016   • Chest pain 2016   • Atrial fibrillation with rapid ventricular response ([address] ) 2016   • Gastroesophageal reflux disease without esophagitis 2016      LOS (days): 1  Geometric Mean LOS (GMLOS) (days):   Days to GMLOS:     OBJECTIVE:  PATIENT READMITTED TO HOSPITAL  Risk of Unplanned Readmission Score: 47 35         Current admission status: Inpatient       Preferred Pharmacy:   [id number] [address] - [address] [address]  Phone: [phone] Fax: [phone]    Primary Care Provider: Wilner De Anda MD    Primary Insurance: Kathryn Armenta  Secondary Insurance:     ASSESSMENT:  [facility], [address] [facility] [address] - Spouse   Primary Phone: [phone] (Mobile)  Home Phone: [phone]               Advance Directives  Does patient have a [address]?: No  Was patient offered paperwork?: Yes (declined)  Does patient currently have a Health Care decision maker?: Yes, please see Health Care Proxy section  Does patient have Advance Directives?: No  Was patient offered paperwork?: Yes (declined)  Primary Contact:  Sylvester Fountain         Readmission Root Cause  30 Day Readmission: Yes  Who directed you to return to the hospital?: Specialist  Did you understand whom to contact if you had questions or problems?: Yes  Did you get your prescriptions before you left the hospital?: No  Reason[de-identified] Preference for own pharmacy  Were you able to get your prescriptions filled when you left the hospital?: Yes  Did you take your medications as prescribed?: Yes  Were you able to get to your follow-up appointments?: Yes  During previous admission, was a post-acute recommendation made?: No  Patient was readmitted due to: acute CHF, afib  Action Plan: NTG gtt, IV lasix    Patient Information  Admitted from[de-identified] Other (comment) (ROBERTO Michaels  office)  Mental Status: Alert  During Assessment patient was accompanied by: Spouse  Assessment information provided by[de-identified] Patient  Primary Caregiver: Self  Support Systems: Spouse/significant other, Family members, Domingo Mosher 61 of Residence: [address] do you live in?: [address] entry access options   Select all that apply : No steps to enter home  Type of Current Residence: Apartment  Floor Level: 1  Upon entering residence, is there a bedroom on the main floor (no further steps)?: Yes  Upon entering residence, is there a bathroom on the main floor (no further steps)?: Yes  In the last 12 months, was there a time when you were not able to pay the mortgage or rent on time?: No  In the last 12 months, how many places have you lived?: 1  In the last 12 months, was there a time when you did not have a steady place to sleep or slept in a shelter (including now)?: No  Homeless/housing insecurity resource given?: N/A  Living Arrangements: Lives w/ Spouse/significant other  Is patient a ?: No    Activities of Daily Living Prior to Admission  Functional Status: Independent  Completes ADLs independently?: Yes  Ambulates independently?: Yes  Does patient use assisted devices?: No  Does patient currently own DME?: Yes  What DME does the patient currently own?: [employer]  Does patient have a history of Outpatient Therapy (PT/OT)?: No  Does the patient have a history of Short-Term Rehab?: No  Does patient have a history of HHC?: Yes (doesn't remember)  Does patient currently have [address]?: No         Patient Information Continued  Income Source: Employed  Does patient have prescription coverage?: Yes  Within the past 12 months, you worried that your food would run out before you got the money to buy more : Never true  Within the past 12 months, the food you bought just didn't last and you didn't have money to get more : Never true  Food insecurity resource given?: N/A  Does patient receive dialysis treatments?: No  Does patient have a history of substance abuse?: No  Does patient have a history of Mental Health Diagnosis?: No         Means of Transportation  Means of Transport to Appts[de-identified] Drives Self  In the past 12 months, has lack of transportation kept you from medical appointments or from getting medications?: No  In the past 12 months, has lack of transportation kept you from meetings, work, or from getting things needed for daily living?: No  Was application for public transport provided?: N/A        DISCHARGE DETAILS:    Discharge planning discussed with[de-identified] patient--no [facility] needs at this time  Freedom of Choice: Yes     CM contacted family/caregiver?: No- see comments (alert and oriented)  Were Treatment Team discharge recommendations reviewed with patient/caregiver?: Yes  Did patient/caregiver verbalize understanding of patient care needs?: Yes  Were patient/caregiver advised of the risks associated with not following Treatment Team discharge recommendations?: Yes    Contacts  Contact Method: Phone  Reason/Outcome: Continuity of Care, Emergency Contact, Discharge [address]         Is the patient interested in [facility] at discharge?:  (ok if needed)    DME Referral Provided  Referral made for DME?: No    Other Referral/Resources/Interventions Provided:  Referral Comments: No needs at this time  Programs[de-identified] CHF    Would you like to participate in our [address] service program?  : No - Declined    Treatment Team Recommendation: Home  Discharge Destination Plan[de-identified] Home  Transport at Discharge : Family                             IMM Given (Date)::  (NA)        Additional Comments: 60yo female admitted with acute on chronix CHF, afib, SOB, atrial fib with RVR, uncontrolled HTN  On IV Lasix and NTG gtt for HTN  Lives with  who will transport when ready

## 2023-01-14 NOTE — ASSESSMENT & PLAN NOTE
Low-sodium diet  Presented with hypertensive emergency requiring a transient period of nitroglycerin infusion  Continue Norvasc/Cardura/Toprol-XL   Low-sodium diet  PRN IV Hydralazine on board for BP spikes

## 2023-01-14 NOTE — PROGRESS NOTES
[facility]  Progress Note - Obed Blocker 1965, 62 y o  female MRN: [id number]  Unit/Bed#: [facility] 410-01 Encounter: [id number]  Primary Care Provider: Luis Jara MD   Date and time admitted to hospital: 1/13/2023 11:51 AM      * Atrial flutter with RVR  Assessment & Plan  Appreciate electrophysiology input -> continue Amiodarone/Toprol-XL regimen - on Xarelto for anticoagulation  Electrophysiology further consulted general cardiology (awaiting consult) in the setting of underlying diastolic CHF  HRs have improved from the 120s on presentation to the 60s currently  TSH normal last month  Hopeful discharge tomorrow pending cardiology clearance    Essential hypertension  Assessment & Plan  Low-sodium diet  Presented with hypertensive emergency requiring a transient period of nitroglycerin infusion  Continue Norvasc/Cardura/Toprol-XL   Low-sodium diet  PRN IV Hydralazine on board for BP spikes    Acute kidney injury superimposed on stage 4 chronic kidney disease   Assessment & Plan  Baseline creatinine of approximately 1 7-1 8 - remains elevated at 2 08 today  Monitor renal function and urine output - limit/avoid nephrotoxins as possible - avoid hypotension as possible - hold Lasix  CT imaging negative for hydronephrosis or obstructive etiology    Acute on chronic diastolic CHF  Assessment & Plan  EF of 65% in December 2022 with mild MR and mild LA dilatation  On admission, transiently underwent a course of nitroglycerin infusion w/ IV Lasix dosing per electrophysiology due to elevated JVD and crackles on exam  Also on beta-blockade w/ Toprol-XL -> will hold further Lasix at this time in the setting of acute on chronic kidney injury  Low-sodium diet w/ fluid restriction enforced  Monitor/replete serum potassium/magnesium deficiencies if present  Await cardiology input    Gastroesophageal reflux disease   Assessment & Plan  Continue PPI regimen  Encourage weight loss    Morbid obesity  Assessment & Plan  BMI of 37 12  Lifestyle/diet modifications    Tobacco abuse  Assessment & Plan  Cessation counseling  Transdermal nicotine patch on board    Anemia of chronic disease  Assessment & Plan  Monitor H/H      DVT Prophylaxis:  Xarelto    Patient Centered Rounds:  I have performed bedside rounds and discussed plan of care with nursing today  Discussions with Specialists or Other Care Team Provider:  see above assessments if applicable    Education and Discussions with Family / Patient:  Patient at bedside - denied need to update other contacts today    Time Spent for Care:  32 minutes  More than 50% of total time spent on counseling and coordination of care as described above  Current Length of Stay: 1 day(s)  Current Patient Status: Inpatient   Certification Statement:  Patient will continue to require additional hospital stay due to assessments as noted above  Code Status: Level 1 - Full Code        Subjective:     Encountered earlier in the morning  Sitting upright in chair without acute complaints  Currently denying palpitations or chest discomfort  Also denying shortness of breath at rest         Objective:     Vitals:   Temp (24hrs), Av 3 °F (36 8 °C), Min:97 8 °F (36 6 °C), Max:98 7 °F (37 1 °C)    Temp:  [97 8 °F (36 6 °C)-98 7 °F (37 1 °C)] 98 4 °F (36 9 °C)  HR:  [60] 60  Resp:  [18] 18  BP: (104-186)/(60-88) 160/86  SpO2:  [94 %-100 %] 95 %  There is no height or weight on file to calculate BMI  Input and Output Summary (last 24 hours):        Intake/Output Summary (Last 24 hours) at 2023 1845  Last data filed at 2023 1307  Gross per 24 hour   Intake 2500 ml   Output 850 ml   Net 1650 ml       Physical Exam:     GENERAL:   Obese - no immediate distress at rest  HEAD:   Normocephalic - atraumatic  EYES:   PERRL - EOMI   MOUTH:   Mucosa moist  NECK:   Supple - full range of motion  CARDIAC:   Rate controlled currently - S1/S2 positive  PULMONARY:   Fairly clear to auscultation currently - nonlabored respirations at rest  ABDOMEN:   Soft - nontender/nondistended - active bowel sounds  MUSCULOSKELETAL:   Motor strength/range of motion mildly deconditioned  NEUROLOGIC:   Alert/oriented at baseline  SKIN:   Chronic wrinkles/blemishes   PSYCHIATRIC:   Mood/affect stable      Additional Data:     Labs & Recent Cultures:    Results from last 7 days   Lab Units 01/13/23  1224   WBC Thousand/uL 4 80   HEMOGLOBIN g/dL 10 5*   HEMATOCRIT % 33 9*   PLATELETS Thousands/uL 178   NEUTROS PCT % 74   LYMPHS PCT % 19   MONOS PCT % 6   EOS PCT % 1     Results from last 7 days   Lab Units 01/14/23  1155 01/13/23  1224   POTASSIUM mmol/L 4 4 3 9   CHLORIDE mmol/L 105 109*   CO2 mmol/L 28 27   BUN mg/dL 25 26*   CREATININE mg/dL 2 08* 2 01*   CALCIUM mg/dL 8 2* 8 7   ALK PHOS U/L  --  69   ALT U/L  --  22   AST U/L  --  18     Results from last 7 days   Lab Units 01/13/23  1231   INR  1 10                             Lines/Drains:  Invasive Devices     Peripheral Intravenous Line  Duration           Peripheral IV 01/13/23 Dorsal (posterior); Right Forearm 1 day                  Last 24 Hours Medication List:   Current Facility-Administered Medications   Medication Dose Route Frequency Provider Last Rate   • acetaminophen  650 mg Oral Q6H PRN Arianna Fairchild MD     • amiodarone  200 mg Oral Daily Hetul Jackson, DO     • amLODIPine  5 mg Oral BID Hetul Jackson, DO     • butalbital-acetaminophen-caffeine  1 tablet Oral Q4H PRN Jeremyul Jackson, DO     • doxazosin  2 mg Oral HS Hetul Jackson, DO     • hydrALAZINE  5 mg Intravenous Q6H PRN Arianna Fairchild MD     • metoprolol succinate  150 mg Oral BID Hetul Jackson, DO     • nicotine  1 patch Transdermal Daily Hetul Jackson, DO     • nitroGLYcerin  5-200 mcg/min Intravenous Titrated Mark Coffey MD Stopped (01/13/23 1900)   • oxyCODONE-acetaminophen  1 tablet Oral Q6H PRN Hetul Jackson, DO     • pantoprazole  40 mg Oral Daily Diana Jackson DO     • rivaroxaban  15 mg Oral Daily With Uche Santos DO                      ** Please Note: This note is constructed using a voice recognition dictation system  An occasional wrong word/phrase or “sound-a-like” substitution may have been picked up by dictation device due to the inherent limitations of voice recognition software  Read the chart carefully and recognize, using reasonable context, where substitutions may have occurred  **

## 2023-01-14 NOTE — ASSESSMENT & PLAN NOTE
EF of 65% in December 2022 with mild MR and mild LA dilatation  On admission, transiently underwent a course of nitroglycerin infusion w/ IV Lasix dosing per electrophysiology due to elevated JVD and crackles on exam  Also on beta-blockade w/ Toprol-XL -> will hold further Lasix at this time in the setting of acute on chronic kidney injury  Low-sodium diet w/ fluid restriction enforced  Monitor/replete serum potassium/magnesium deficiencies if present  Await cardiology input

## 2023-01-14 NOTE — ASSESSMENT & PLAN NOTE
Appreciate electrophysiology input -> continue Amiodarone/Toprol-XL regimen - on Xarelto for anticoagulation  Electrophysiology further consulted general cardiology (awaiting consult) in the setting of underlying diastolic CHF  HRs have improved from the 120s on presentation to the 60s currently  TSH normal last month  Hopeful discharge tomorrow pending cardiology clearance

## 2023-01-15 VITALS
DIASTOLIC BLOOD PRESSURE: 66 MMHG | HEART RATE: 61 BPM | SYSTOLIC BLOOD PRESSURE: 145 MMHG | OXYGEN SATURATION: 95 % | TEMPERATURE: 97.7 F | RESPIRATION RATE: 20 BRPM

## 2023-01-15 LAB
ANION GAP SERPL CALCULATED.3IONS-SCNC: 6 MMOL/L (ref 4–13)
BASOPHILS # BLD AUTO: 0.02 THOUSANDS/ÂΜL (ref 0–0.1)
BASOPHILS NFR BLD AUTO: 1 % (ref 0–1)
BUN SERPL-MCNC: 22 MG/DL (ref 5–25)
CALCIUM SERPL-MCNC: 8.4 MG/DL (ref 8.3–10.1)
CHLORIDE SERPL-SCNC: 108 MMOL/L (ref 96–108)
CO2 SERPL-SCNC: 27 MMOL/L (ref 21–32)
CREAT SERPL-MCNC: 1.92 MG/DL (ref 0.6–1.3)
EOSINOPHIL # BLD AUTO: 0.04 THOUSAND/ÂΜL (ref 0–0.61)
EOSINOPHIL NFR BLD AUTO: 1 % (ref 0–6)
ERYTHROCYTE [DISTWIDTH] IN BLOOD BY AUTOMATED COUNT: 14.6 % (ref 11.6–15.1)
GFR SERPL CREATININE-BSD FRML MDRD: 28 ML/MIN/1.73SQ M
GLUCOSE SERPL-MCNC: 85 MG/DL (ref 65–140)
HCT VFR BLD AUTO: 31.2 % (ref 34.8–46.1)
HGB BLD-MCNC: 9.3 G/DL (ref 11.5–15.4)
IMM GRANULOCYTES # BLD AUTO: 0.01 THOUSAND/UL (ref 0–0.2)
IMM GRANULOCYTES NFR BLD AUTO: 0 % (ref 0–2)
LYMPHOCYTES # BLD AUTO: 0.89 THOUSANDS/ÂΜL (ref 0.6–4.47)
LYMPHOCYTES NFR BLD AUTO: 26 % (ref 14–44)
MAGNESIUM SERPL-MCNC: 2.4 MG/DL (ref 1.6–2.6)
MCH RBC QN AUTO: 25 PG (ref 26.8–34.3)
MCHC RBC AUTO-ENTMCNC: 29.8 G/DL (ref 31.4–37.4)
MCV RBC AUTO: 84 FL (ref 82–98)
MONOCYTES # BLD AUTO: 0.28 THOUSAND/ÂΜL (ref 0.17–1.22)
MONOCYTES NFR BLD AUTO: 8 % (ref 4–12)
NEUTROPHILS # BLD AUTO: 2.19 THOUSANDS/ÂΜL (ref 1.85–7.62)
NEUTS SEG NFR BLD AUTO: 64 % (ref 43–75)
NRBC BLD AUTO-RTO: 0 /100 WBCS
PLATELET # BLD AUTO: 119 THOUSANDS/UL (ref 149–390)
PMV BLD AUTO: 11 FL (ref 8.9–12.7)
POTASSIUM SERPL-SCNC: 4.1 MMOL/L (ref 3.5–5.3)
RBC # BLD AUTO: 3.72 MILLION/UL (ref 3.81–5.12)
SODIUM SERPL-SCNC: 141 MMOL/L (ref 135–147)
WBC # BLD AUTO: 3.43 THOUSAND/UL (ref 4.31–10.16)

## 2023-01-15 RX ORDER — LISINOPRIL 20 MG/1
20 TABLET ORAL DAILY
Qty: 30 TABLET | Refills: 0 | Status: SHIPPED | OUTPATIENT
Start: 2023-01-16

## 2023-01-15 RX ORDER — FUROSEMIDE 40 MG/1
40 TABLET ORAL DAILY
Qty: 30 TABLET | Refills: 0 | Status: SHIPPED | OUTPATIENT
Start: 2023-01-15

## 2023-01-15 RX ORDER — LISINOPRIL 20 MG/1
20 TABLET ORAL DAILY
Status: DISCONTINUED | OUTPATIENT
Start: 2023-01-15 | End: 2023-01-15 | Stop reason: HOSPADM

## 2023-01-15 RX ORDER — FUROSEMIDE 40 MG/1
40 TABLET ORAL DAILY
Status: DISCONTINUED | OUTPATIENT
Start: 2023-01-15 | End: 2023-01-15 | Stop reason: HOSPADM

## 2023-01-15 RX ADMIN — FUROSEMIDE 40 MG: 40 TABLET ORAL at 09:26

## 2023-01-15 RX ADMIN — OXYCODONE HYDROCHLORIDE AND ACETAMINOPHEN 1 TABLET: 5; 325 TABLET ORAL at 08:27

## 2023-01-15 RX ADMIN — NICOTINE 1 PATCH: 21 PATCH, EXTENDED RELEASE TRANSDERMAL at 08:27

## 2023-01-15 RX ADMIN — LISINOPRIL 20 MG: 20 TABLET ORAL at 10:24

## 2023-01-15 RX ADMIN — BUTALBITAL, ACETAMINOPHEN, AND CAFFEINE 1 TABLET: 50; 325; 40 TABLET ORAL at 06:04

## 2023-01-15 RX ADMIN — PANTOPRAZOLE SODIUM 40 MG: 40 TABLET, DELAYED RELEASE ORAL at 08:26

## 2023-01-15 RX ADMIN — METOPROLOL SUCCINATE 150 MG: 100 TABLET, EXTENDED RELEASE ORAL at 08:27

## 2023-01-15 RX ADMIN — AMLODIPINE BESYLATE 5 MG: 5 TABLET ORAL at 08:27

## 2023-01-15 NOTE — DISCHARGE SUMMARY
Discharge Summary - [facility]  Patient: Berkley Smallwood 62 y o  female   MRN: [id number]  PCP: Drake Tyler MD  Unit/Bed#: [facility] 410-01 Encounter: [id number]            Discharging Physician / Practitioner: Magnus Tamayo MD  PCP: Drake Tyler MD  Admission Date:   Admission Orders (From admission, onward)     Ordered        01/13/23 1627  Inpatient Admission  Once                      Discharge Date: 01/15/23      Reason for Admission:  Shortness of breath      Discharge Diagnoses:     Principal Problem:    Atrial flutter with RVR    Active Problems:    Essential hypertension    Stage 4 chronic kidney disease     Acute on chronic diastolic CHF    Gastroesophageal reflux disease     Morbid obesity    Tobacco abuse    Anemia of chronic disease    Resolved Problems:  Acute kidney injury      Consultations During Hospital Stay:  · Electrophysiology  · Cardiology      Hospital Course:     * Atrial flutter with RVR  Assessment & Plan  Appreciate electrophysiology input with discontinuation of Amiodarone now -> continue Toprol-XL regimen   On Xarelto for anticoagulation  Electrophysiology further consulted general cardiology (awaiting consult) in the setting of underlying diastolic CHF  HRs have improved from the 120s on presentation to the 60s currently  TSH normal last month   Plan for discharge home today     Essential hypertension  Assessment & Plan  Low-sodium diet  Presented with hypertensive emergency requiring a transient period of nitroglycerin infusion  Continue Norvasc/Cardura/Toprol-XL - Lisinopril added per cardiology  Low-sodium diet encouraged  PRN IV Hydralazine on board for BP spikes during hospital course     Acute kidney injury superimposed on stage 4 chronic kidney disease   Assessment & Plan  Baseline creatinine of approximately 1 7-1 8 - improved to 1 92 today from a 2 08 peak  Lasix resumed by cardiology along with initiation of ACEI therapy (Lisinopril)  CT imaging negative for hydronephrosis or obstructive etiology     Acute on chronic diastolic CHF  Assessment & Plan  EF of 65% in December 2022 with mild MR and mild LA dilatation  On admission, transiently underwent a course of nitroglycerin infusion w/ IV Lasix dosing per electrophysiology due to elevated JVD and crackles on exam  Also on beta-blockade w/ Toprol-XL -> as acute kidney injury resolved, started back on oral Lasix per cardiology  Low-sodium diet w/ fluid restriction enforced     Gastroesophageal reflux disease   Assessment & Plan  Continue PPI regimen  Encourage weight loss     Morbid obesity  Assessment & Plan  BMI of 37 12  Lifestyle/diet modifications     Tobacco abuse  Assessment & Plan  Cessation counseling  Transdermal nicotine patch on board during hospital course     Anemia of chronic disease  Assessment & Plan  H/H stable      Condition at Discharge: fair       Discharge Day Visit / Exam:     Vitals: Blood Pressure: 145/66 (01/15/23 1022)  Pulse: 61 (01/15/23 1022)  Temperature: 97 7 °F (36 5 °C) (01/15/23 1022)  Temp Source: Oral (01/15/23 1022)  Respirations: 20 (01/15/23 1022)  SpO2: 95 % (01/15/23 1022)      Physical exam - I had a face-to-face encounter with the patient on day of discharge  Discussion with Patient and/or Family:  The patient has been advised to return to the ER immediately if any symptoms recur or worsen  Discharge instructions/Information to Patient and/or Family:   See after visit summary for information provided to patient and/or family  Provisions for Follow-Up Care:  See after visit summary for information related to follow-up care and any pertinent home health orders  Disposition:   Home      Discharge Medications:  See after visit summary for reconciled discharge medications provided to patient and/or family  Discharge Statement:  I spent 38 minutes discharging the patient  This time was spent on the day of discharge   I had direct contact with the patient on the day of discharge  Greater than 50% of the total time was spent examining patient, answering all patient questions, arranging and discussing plan of care with patient as well as directly providing post-discharge instructions  Additional time then spent on discharge activities  ** Please Note: This note is constructed using a voice recognition dictation system  An occasional wrong word/phrase or “sound-a-like” substitution may have been picked up by dictation device due to the inherent limitations of voice recognition software  Read the chart carefully and recognize, using reasonable context, where substitutions may have occurred  **

## 2023-01-15 NOTE — PROGRESS NOTES
Progress Note - Electrophysiology-Cardiology (EP)   Sedrick Banerjee 62 y o  female MRN: [id number]  Unit/Bed#: [facility] 410-01 Encounter: [id number]      Assessment:  1  Atypical atrial flutter, symptomatic               * she was hospitalized in October 2022 for palpitations  Was in atrial flutter w/ RVR at that time which converted to NSR  Her amiodrone was increased from 100mg to 200mg by the general cardiology team  She was again hospitalized for palpitations and SOB in December being found to be in acute HFpEF exacerbation and atrial flutter w/ RVR which spontaneously converted to NSR  * during OV on 1/13/23 she is again in atrial flutter w/ RVR  Device interrogation shows she has been in RVR for the past 8 days  She was sent to ED for further work up  Atrial flutter was pace terminated  She was placed on nitro drip for HTN emergency  * She has hx of cryo PVI by Dr Berta Salinas in 2020 and RFA of left atrial roof flutter and left atrial septum by dr Pao Carter in June 2022  * she has a hx of tachycardia induced cardiomyopathy which has resolved  Most recent EF 65%  Secondary diagnosis:   1  Hx VT s/p DC ICD   2  HCM   3  HTN  4  Obesity   5  Hx paroxysmal atrial fibrillation s/p cryo PVI by Dr Berta Salinas in 2020   6  SURINDER on CPAP   7  Hx of cocaine use - 2015  8  Hx HFpEF / hx tachycardic induced CMP   9  Tobacco abuse       Plan: We will discontinue amiodarone, as this may be exacerbating the atrial flutter  She should continue Toprol  mg BID and Xarelto 15 mg QD  Antihypertensives and diuretics per General Cardiology team      Patient is stable from an EP standpoint  She will follow up in the office in 4-6 weeks  Subjective/Objective     Subjective: Upon evaluation, patient is resting comfortably in her chair  She offers no complaints  Blood pressure is improved  She is off nitro drip and back on her home regimen   Telemetry review shows no episodes of tachyarrhythmias  Patient denies chest pain/heaviness/tightness/pressure, palpitations, shortness of breath, orthopnea, lightheadedness, presyncope, syncope or N/V  Objective:     Vitals: /75 (BP Location: Left arm)   Pulse 60   Temp 97 8 °F (36 6 °C) (Oral)   Resp 22   SpO2 97%   There were no vitals filed for this visit  Orthostatic Blood Pressures    Flowsheet Row Most Recent Value   Blood Pressure 140/75 filed at 01/15/2023 0743   Patient Position - Orthostatic VS Sitting filed at 01/15/2023 0743            Intake/Output Summary (Last 24 hours) at 1/15/2023 1012  Last data filed at 1/15/2023 0833  Gross per 24 hour   Intake 785 ml   Output 200 ml   Net 585 ml       Invasive Devices     Peripheral Intravenous Line  Duration           Peripheral IV 01/13/23 Dorsal (posterior); Right Forearm 1 day                            Scheduled Meds:  Current Facility-Administered Medications   Medication Dose Route Frequency Provider Last Rate   • acetaminophen  650 mg Oral Q6H PRN Magnus Tamayo MD     • amLODIPine  5 mg Oral BID Hetul Jackson, DO     • butalbital-acetaminophen-caffeine  1 tablet Oral Q4H PRN Hetul Jackson, DO     • doxazosin  2 mg Oral HS Hetul Jackson, DO     • furosemide  40 mg Oral Daily John Paul Nogueira DO     • lisinopril  20 mg Oral Daily Riccardo Shetty MD     • metoprolol succinate  150 mg Oral BID Hetul Jackson, DO     • nicotine  1 patch Transdermal Daily Hetul Jackson, DO     • oxyCODONE-acetaminophen  1 tablet Oral Q6H PRN Diana Jackson, DO     • pantoprazole  40 mg Oral Daily Hetul Jackson, DO     • rivaroxaban  15 mg Oral Daily With [employer] DO Ju       Continuous Infusions:   PRN Meds: •  acetaminophen  •  butalbital-acetaminophen-caffeine  •  oxyCODONE-acetaminophen    Review of Systems:  ROS as noted above, otherwise 12 point review of systems was performed and is negative  Physical Exam:   Physical Exam  Vitals reviewed     Constitutional:       General: She is not in acute distress  Appearance: She is not ill-appearing or diaphoretic  HENT:      Head: Normocephalic and atraumatic  Right Ear: External ear normal       Left Ear: External ear normal       Nose: Nose normal    Eyes:      General:         Right eye: No discharge  Left eye: No discharge  Cardiovascular:      Rate and Rhythm: Normal rate and regular rhythm  Heart sounds: No murmur heard  No friction rub  Pulmonary:      Effort: Pulmonary effort is normal       Breath sounds: Normal breath sounds  No wheezing, rhonchi or rales  Abdominal:      General: There is no distension  Palpations: Abdomen is soft  Tenderness: There is no abdominal tenderness  Musculoskeletal:         General: No deformity or signs of injury  Cervical back: No rigidity  No muscular tenderness  Right lower leg: No edema  Left lower leg: No edema  Skin:     General: Skin is warm and dry  Capillary Refill: Capillary refill takes less than 2 seconds  Coloration: Skin is not jaundiced or pale  Neurological:      General: No focal deficit present  Mental Status: She is alert and oriented to person, place, and time  Mental status is at baseline  Psychiatric:         Mood and Affect: Mood normal          Behavior: Behavior normal          Thought Content: Thought content normal                      Lab Results: I have personally reviewed pertinent lab results      Results from last 7 days   Lab Units 01/15/23  0553 01/13/23  1224   WBC Thousand/uL 3 43* 4 80   HEMOGLOBIN g/dL 9 3* 10 5*   HEMATOCRIT % 31 2* 33 9*   PLATELETS Thousands/uL 119* 178     Results from last 7 days   Lab Units 01/15/23  0553 01/14/23  1155 01/13/23  1224   POTASSIUM mmol/L 4 1 4 4 3 9   CHLORIDE mmol/L 108 105 109*   CO2 mmol/L 27 28 27   BUN mg/dL 22 25 26*   CREATININE mg/dL 1 92* 2 08* 2 01*   CALCIUM mg/dL 8 4 8 2* 8 7     Results from last 7 days   Lab Units 01/13/23  1231   INR  1 10 Results from last 7 days   Lab Units 01/15/23  0553 01/14/23  1155   MAGNESIUM mg/dL 2 4 2 4         Imaging: I have personally reviewed pertinent films in PACS  ECHO: 12/21/22  •  Left Ventricle: Left ventricular cavity size is normal  There is mild concentric hypertrophy  The left ventricular ejection fraction is 65%  Systolic function is normal  Wall motion is normal  Diastolic function is moderately abnormal, consistent with grade II (pseudonormal) relaxation  •  Right Ventricle: Right ventricular cavity size is normal  Systolic function is normal  A pacer wire is present  •  Left Atrium: The atrium is mildly dilated  •  Mitral Valve: There is mild regurgitation  •  Prior TTE study available for comparison  Prior study date: 7/28/2022  No significant changes noted compared to the prior study          VTE Pharmacologic Prophylaxis: Xarelto  VTE Mechanical Prophylaxis: sequential compression device
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
## 2023-01-15 NOTE — UTILIZATION REVIEW
Initial Clinical Review    Admission: Date/Time/Statement:   Admission Orders (From admission, onward)     Ordered        01/13/23 1627  Inpatient Admission  Once                      Orders Placed This Encounter   Procedures   • Inpatient Admission     Standing Status:   Standing     Number of Occurrences:   1     Order Specific Question:   Level of Care     Answer:   Med Surg [16]     Order Specific Question:   Estimated length of stay     Answer:   More than 2 Midnights     Order Specific Question:   Certification     Answer:   I certify that inpatient services are medically necessary for this patient for a duration of greater than two midnights  See H&P and MD Progress Notes for additional information about the patient's course of treatment  ED Arrival Information     Expected   1/13/2023     Arrival   1/13/2023 11:44    Acuity   Urgent            Means of arrival   Walk-In    Escorted by   Self    Service   Hospitalist    Admission type   Emergency            Arrival complaint   Atrial fibrillation with rapid ventricular response [facility])           Chief Complaint   Patient presents with   • Chest Pain     Pt sent in by  for an ablation for a fib  Currently having CP x 3 weeks and reports new headache  Initial Presentation: 62 y o  female , presented to the ED @ [facility] from home via walk in  Admitted as Inpatient due to Atrial Fibrillation with RVR  Date: 01/13/2023   Presents to the hospital after evaluation by EP earlier today  Patient was sent in by EP for evaluation of A  fib with RVR  Patient presents with symptoms of shortness of breath  She present w ABDUL which she reports to be chronic  She was given IV lasix in the ED  Has a Pacer  Monitor on telemetry  I&O  Continue home medication including Toprol  Consult Cardio  01/13/2023  Consult EP:  Give Lasix 80 mg once IV  Follow-up urine output for net negative volume status    Nitro drip to bring the blood pressure down to at least 160s millimeters of mercury systolic  Status post 50 mg Lopressor  Restart home antihypertensives  Continue with Xarelto for anticoagulation  Day 2: 01/14/2023    Monitor on telemety  Continue Amiodarone / trprol XL  Continue NTG gtt   I&O       01/15/2023  Consult Cardio: This is a 80-year-old female with past medical history of hypertension, diastolic heart failure EF 65%, Aflutter with ablation in 2020, VT s/p ICD, GERD, obesity, CKD 4, cocaine use who initially presented with a symptomatic a flutter with RVR  He was involved and patient was started on p o  amiodarone and Toprol-XL  Now converted to normal sinus rhythm  General cardiology was involved given signs of volume overload and hypertensive emergency in hospital    #Hypertensive emergency-was started on nitroglycerin gtt and received Lasix in hospital   #History of hypertension-on amlodipine, cardura at home  #Diastolic heart failure EF 65%-on Lasix 40 mg daily at home  #obesity, SURNIDER on CPAP  #CKD  #History of cocaine use 2015  # hx of Aflutter s/p ablation in 2020  # hx of VT s/p ICD, no significant CAD on cath from 2019   Plan: In NSR on tele  Will restart home p o  Lasix 40 mg daily  BP now improved with nitro gtt and lasix once  Will start lisinopril 20 mg daily given blood pressure still elevated 140s and has proteinuria  Continue home Norvasc and Cardura on discharge  EP following for her Aflutter  On Xarelto for anticoagulation         ED Triage Vitals   Temperature Pulse Respirations Blood Pressure SpO2   01/13/23 1155 01/13/23 1155 01/13/23 1155 01/13/23 1155 01/13/23 1155   97 6 °F (36 4 °C) (!) 126 18 (!) 201/130 98 %      Temp Source Heart Rate Source Patient Position - Orthostatic VS BP Location FiO2 (%)   01/13/23 1155 01/13/23 1155 01/13/23 1920 01/13/23 1920 --   Oral Monitor Lying Left arm       Pain Score       01/13/23 1155       4          Wt Readings from Last 1 Encounters:   01/13/23 108 kg (237 lb) Additional Vital Signs:   Date/Time Temp Pulse Resp BP MAP (mmHg) SpO2 O2 Device Patient Position - Orthostatic VS   01/15/23 1022 97 7 °F (36 5 °C) 61 20 145/66 95 95 % None (Room air) Sitting   01/15/23 0800 -- -- -- -- -- -- None (Room air) --   01/15/23 0743 97 8 °F (36 6 °C) 60 22 140/75 99 97 % None (Room air) Sitting   01/15/23 0300 -- 60 -- 127/60 86 -- -- Lying   01/14/23 2300 98 1 °F (36 7 °C) -- -- 140/77 103 -- -- Lying   01/14/23 2147 -- -- -- 183/84 Abnormal  -- -- -- --   01/14/23 1926 -- -- -- 168/78 112 -- -- Lying   01/14/23 1923 98 4 °F (36 9 °C) 60 18 182/84 Abnormal  120 95 % None (Room air) Lying   01/14/23 1453 98 4 °F (36 9 °C) 60 18 160/86 117 95 % None (Room air) Lying   01/14/23 1439 -- -- -- 186/88 Abnormal  124 -- -- Lying   01/14/23 1159 98 4 °F (36 9 °C) 60 18 140/82 106 96 % None (Room air) Sitting   01/14/23 0800 -- -- -- -- -- 97 % None (Room air) --   01/14/23 0740 98 4 °F (36 9 °C) 60 18 142/75 101 95 % None (Room air) Sitting   01/14/23 0249 97 8 °F (36 6 °C) 60 -- 128/75 96 100 % None (Room air) Lying   01/13/23 2252 98 3 °F (36 8 °C) 60 18 110/73 87 98 % None (Room air) Lying   01/13/23 2225 -- -- -- 104/60 -- -- -- --   01/13/23 1920 98 7 °F (37 1 °C) 60 18 120/70 89 94 % None (Room air) Lying   01/13/23 1745 -- 58 22 170/92 126 96 % -- --   01/13/23 1401 -- 83 -- -- -- -- -- --   01/13/23 1400 -- 86 18 170/98 126 93 % None (Room air) --   01/13/23 1334 -- 123 Abnormal  -- 183/98 Abnormal  -- -- -- --     Date and Time Eye Opening Best Verbal Response Best Motor Response [place] Coma Scale Score   01/15/23 0800 4 5 6 15   01/14/23 2000 4 5 6 15   01/14/23 0800 4 5 6 15   01/13/23 2000 4 5 6 15   01/13/23 1830 4 5 6 15         Pertinent Labs/Diagnostic Test Results:   XR chest 1 view portable   Final Result by Zoie Gorman MD (01/13 7367)      No acute cardiopulmonary disease          Results from last 7 days   Lab Units 01/13/23  1224   SARS-COV-2  Negative     Results from last 7 days   Lab Units 01/15/23  0553 01/13/23  1224   WBC Thousand/uL 3 43* 4 80   HEMOGLOBIN g/dL 9 3* 10 5*   HEMATOCRIT % 31 2* 33 9*   PLATELETS Thousands/uL 119* 178   NEUTROS ABS Thousands/µL 2 19 3 53     Results from last 7 days   Lab Units 01/15/23  0553 01/14/23  1155 01/13/23  1224   SODIUM mmol/L 141 139 141   POTASSIUM mmol/L 4 1 4 4 3 9   CHLORIDE mmol/L 108 105 109*   CO2 mmol/L 27 28 27   ANION GAP mmol/L 6 6 5   BUN mg/dL 22 25 26*   CREATININE mg/dL 1 92* 2 08* 2 01*   EGFR ml/min/1 73sq m 28 25 26   CALCIUM mg/dL 8 4 8 2* 8 7   MAGNESIUM mg/dL 2 4 2 4  --      Results from last 7 days   Lab Units 01/13/23  1224   AST U/L 18   ALT U/L 22   ALK PHOS U/L 69   TOTAL PROTEIN g/dL 7 2   ALBUMIN g/dL 3 4*   TOTAL BILIRUBIN mg/dL 0 34     Results from last 7 days   Lab Units 01/15/23  0553 01/14/23  1155 01/13/23  1224   GLUCOSE RANDOM mg/dL 85 107 141*     Results from last 7 days   Lab Units 01/13/23  1432 01/13/23  1224   HS TNI 0HR ng/L  --  36   HS TNI 2HR ng/L 38  --    HSTNI D2 ng/L 2  --      Results from last 7 days   Lab Units 01/13/23  1231   PROTIME seconds 14 4   INR  1 10     Results from last 7 days   Lab Units 01/13/23  1224   NT-PRO BNP pg/mL 9,053*     Results from last 7 days   Lab Units 01/13/23  1224   INFLUENZA A PCR  Negative   INFLUENZA B PCR  Negative   RSV PCR  Negative     ED Treatment:   Medication Administration from 01/13/2023 0956 to 01/13/2023 1806       Date/Time Order Dose Route Action     01/13/2023 1357 EST diazepam (VALIUM) injection 2 5 mg 2 5 mg Intravenous Given     01/13/2023 1433 EST multi-electrolyte (ISOLYTE-S PH 7 4) bolus 500 mL 500 mL Intravenous New Bag     01/13/2023 1353 EST diltiazem (CARDIZEM) injection 20 mg 20 mg Intravenous Given     01/13/2023 1617 EST nitroGLYcerin (TRIDIL) 50 mg in 250 mL infusion (premix) 10 mcg/min Intravenous Started During Downtime     01/13/2023 1540 EST metoprolol tartrate (LOPRESSOR) tablet 50 mg 50 mg Oral Given 01/13/2023 1630 EST oxyCODONE-acetaminophen (PERCOCET) 5-325 mg per tablet 1 tablet 1 tablet Oral Given During Downtime     01/13/2023 1713 EST furosemide (LASIX) 10 mg/mL injection **ADS Override Pull** 40 mg Intravenous Given     01/13/2023 1645 EST furosemide (LASIX) 10 mg/mL injection **ADS Override Pull** 40 mg Intravenous Given        Past Medical History:   Diagnosis Date   • [facility] 2/7/2021   • Arrhythmia    • Arthritis    • Atrial fibrillation (HCC)    • Atrial fibrillation with rapid ventricular response ([facility] ) 3/20/2016   • Breast lump    • CKD (chronic kidney disease) stage 3, GFR 30-59 ml/min (HCC)    • Disease of thyroid gland    • 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 ([facility] ) 5/25/2020   • GERD (gastroesophageal reflux disease)    • H/O transfusion 1987   • Hepatitis C     resolved   • Hepatitis C    • Hyperlipidemia    • Hypertension    • Irregular heart beat    • Pacemaker    • Sleep apnea     no cpap   • Tachycardia      Present on Admission:  • Acute on chronic diastolic CHF  • Gastroesophageal reflux disease   • Atrial flutter with RVR      Admitting Diagnosis: Acute on chronic combined systolic and diastolic heart failure (HCC) [I50 43]  A-fib (HCC) [I48 91]  SOB (shortness of breath) [R06 02]  CHF exacerbation (HCC) [I50 9]  Atrial fibrillation with rapid ventricular response ([facility] ) [I48 91]  Hypertension, uncontrolled [I10]  Age/Sex: 62 y o  female  Admission Orders:  CV diet with fld restrictions 1800ml  Telemetry  Salvador SCDs    Scheduled Medications:  amLODIPine, 5 mg, Oral, BID  doxazosin, 2 mg, Oral, HS  furosemide, 40 mg, Oral, Daily  lisinopril, 20 mg, Oral, Daily  metoprolol succinate, 150 mg, Oral, BID  nicotine, 1 patch, Transdermal, Daily  pantoprazole, 40 mg, Oral, Daily  rivaroxaban, 15 mg, Oral, Daily With Dinner  multi-electrolyte (ISOLYTE-S PH 7 4) bolus 500 mL  Dose: 500 mL  Freq:  Once Route: IV  Last Dose: Stopped (01/13/23 1700)  Start: 01/13/23 1315 End: 01/13/23 1700  furosemide (LASIX) 10 mg/mL injection   Start: 01/13/23 1637 End: 01/13/23 1645  furosemide (LASIX) 10 mg/mL injection   Start: 01/13/23 1637 End: 01/13/23 1713    Continuous IV Infusions:  nitroGLYcerin (TRIDIL) 50 mg in 250 mL infusion (premix)  Rate: 1 5-60 mL/hr Dose: 5-200 mcg/min  Freq: Titrated Route: IV  Last Dose: Stopped (01/13/23 1900)  Start: 01/13/23 1545 End: 01/15/23 0902    PRN Meds:  acetaminophen, 650 mg, Oral, Q6H PRN  X 1 dose 1/14  butalbital-acetaminophen-caffeine, 1 tablet, Oral, Q4H PRN  X 2 doses 1/14;  X 1 dose 1/15  oxyCODONE-acetaminophen, 1 tablet, Oral, Q6H PRN  X 1 dose 1/13;  X 3 doses 1/14;  X 1 dose 1/15        IP CONSULT TO ELECTROPHYSIOLOGY  IP CONSULT TO CARDIOLOGY    Network Utilization Review Department  ATTENTION: Please call with any questions or concerns to [phone] and carefully listen to the prompts so that you are directed to the right person  All voicemails are confidential   Cheko Riggs all requests for admission clinical reviews, approved or denied determinations and any other requests to dedicated fax number below belonging to the campus where the patient is receiving treatment   List of dedicated fax numbers for the Facilities:  [address] DENIALS (Administrative/Medical Necessity) [phone]   [address] (Maternity/NICU/Pediatrics) [phone]   [address] [phone]   [address] [phone]   [facility] [place] [address] [address] [phone]   1550 [facility] [phone]   [facility] [phone]   [address] [place] [address]   370 [facility] [phone]

## 2023-01-16 ENCOUNTER — CONSULT (OUTPATIENT)
Dept: CARDIOLOGY CLINIC | Facility: CLINIC | Age: 58
End: 2023-01-16

## 2023-01-16 VITALS
SYSTOLIC BLOOD PRESSURE: 146 MMHG | DIASTOLIC BLOOD PRESSURE: 78 MMHG | HEART RATE: 60 BPM | WEIGHT: 235.8 LBS | OXYGEN SATURATION: 99 % | HEIGHT: 68 IN | BODY MASS INDEX: 35.74 KG/M2

## 2023-01-16 DIAGNOSIS — I48.91 ATRIAL FIBRILLATION WITH RAPID VENTRICULAR RESPONSE (HCC): ICD-10-CM

## 2023-01-16 DIAGNOSIS — I10 PRIMARY HYPERTENSION: ICD-10-CM

## 2023-01-16 DIAGNOSIS — I50.32 CHRONIC HEART FAILURE WITH PRESERVED EJECTION FRACTION (HFPEF) (HCC): ICD-10-CM

## 2023-01-16 DIAGNOSIS — N18.32 STAGE 3B CHRONIC KIDNEY DISEASE (HCC): ICD-10-CM

## 2023-01-16 DIAGNOSIS — I48.0 PAROXYSMAL ATRIAL FIBRILLATION (HCC): Primary | ICD-10-CM

## 2023-01-16 DIAGNOSIS — Z72.0 TOBACCO ABUSE: ICD-10-CM

## 2023-01-16 LAB
ATRIAL RATE: 131 BPM
ATRIAL RATE: 59 BPM
P AXIS: 84 DEGREES
P AXIS: 97 DEGREES
PR INTERVAL: 184 MS
PR INTERVAL: 186 MS
QRS AXIS: -54 DEGREES
QRS AXIS: -64 DEGREES
QRSD INTERVAL: 172 MS
QRSD INTERVAL: 178 MS
QT INTERVAL: 346 MS
QT INTERVAL: 484 MS
QTC INTERVAL: 479 MS
QTC INTERVAL: 505 MS
T WAVE AXIS: 104 DEGREES
T WAVE AXIS: 114 DEGREES
VENTRICULAR RATE: 128 BPM
VENTRICULAR RATE: 59 BPM

## 2023-01-16 NOTE — UTILIZATION REVIEW
NOTIFICATION OF INPATIENT ADMISSION   AUTHORIZATION REQUEST   SERVICING FACILITY:   [facility]  Address: [address] [place] [address], [address] [place] [address]  Tax ID: [id number]  NPI: [id number] ATTENDING PROVIDER:  Attending Name and NPI#: Gregory Hernandez Md [[id number]]  Address: [address] [place] [address]  Phone: [phone]   ADMISSION INFORMATION:  Place of Service: [place] [address]  Place of Service Code: 21  Inpatient Admission Date/Time: 1/13/23  4:27 PM  Discharge Date/Time: 1/15/2023  1:08 PM  Admitting Diagnosis Code/Description:  Acute on chronic combined systolic and diastolic heart failure (HCC) [I50 43]  A-fib (HCC) [I48 91]  SOB (shortness of breath) [R06 02]  CHF exacerbation ([facility] ) [I50 9]  Atrial fibrillation with rapid ventricular response ([facility] ) [I48 91]  Hypertension, uncontrolled [I10]     UTILIZATION REVIEW CONTACT:  Isaiah Martinez Utilization   Network Utilization Review Department  Phone: [phone]  Fax: [phone]  Email: [email or web address]  Contact for approvals/pending authorizations, clinical reviews, and discharge  PHYSICIAN ADVISORY SERVICES:  Medical Necessity Denial & Dscj-nw-Ytmr Review  Phone: [phone]  Fax: [phone]  Email: [email or web address]

## 2023-01-16 NOTE — PATIENT INSTRUCTIONS
Maintain a 2 gram daily sodium diet and 2 liter daily fluid restriction  Check daily weights  If you gained 3 pounds in one day, 5 pounds in one week, or experience worsening shortness of breath or increasing lower leg swelling  Please call the heart failure office at [phone]    Please bring a  list of your current medications and daily weights to the office visit     Weight within 3 pounds  Goal

## 2023-01-16 NOTE — UTILIZATION REVIEW
NOTIFICATION OF ADMISSION DISCHARGE   This is a Notification of Discharge from [facility]  Please be advised that this patient has been discharge from our facility  Below you will find the admission and discharge date and time including the patient’s disposition  UTILIZATION REVIEW CONTACT:  Irene Austin  Utilization   Network Utilization Review Department  Phone: [phone] x carefully listen to the prompts  All voicemails are confidential   Email: [email or web address]     ADMISSION INFORMATION  PRESENTATION DATE: 1/13/2023 11:51 AM  OBERVATION ADMISSION DATE:   INPATIENT ADMISSION DATE: 1/13/23  4:27 PM   DISCHARGE DATE: 1/15/2023  1:08 PM   DISPOSITION:Home/Self Care    IMPORTANT INFORMATION:  Send all requests for admission clinical reviews, approved or denied determinations and any other requests to dedicated fax number below belonging to the campus where the patient is receiving treatment   List of dedicated fax numbers:  [address] DENIALS (Administrative/Medical Necessity) [phone]   [address] (Maternity/NICU/Pediatrics) [phone]   [facility] [phone]   [facility] [phone]   [facility] [phone]   220 [facility] [phone] 90 [facility] [phone]   [address] [place] [address] 119 [phone]   [facility]  [phone]   4050 [facility] [phone]   412 [facility] 850 [facility] [phone]

## 2023-01-17 ENCOUNTER — APPOINTMENT (EMERGENCY)
Dept: RADIOLOGY | Facility: HOSPITAL | Age: 58
End: 2023-01-17

## 2023-01-17 ENCOUNTER — HOSPITAL ENCOUNTER (EMERGENCY)
Facility: HOSPITAL | Age: 58
Discharge: HOME | End: 2023-01-17
Attending: EMERGENCY MEDICINE

## 2023-01-17 VITALS
HEART RATE: 59 BPM | DIASTOLIC BLOOD PRESSURE: 53 MMHG | WEIGHT: 235 LBS | OXYGEN SATURATION: 98 % | SYSTOLIC BLOOD PRESSURE: 179 MMHG | TEMPERATURE: 98 F | HEIGHT: 67 IN | BODY MASS INDEX: 36.88 KG/M2 | RESPIRATION RATE: 18 BRPM
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
## 2023-01-17 DIAGNOSIS — M79.605 LEFT LEG PAIN: Primary | ICD-10-CM

## 2023-01-17 DIAGNOSIS — S80.812A LEG ABRASION, LEFT, INITIAL ENCOUNTER: ICD-10-CM

## 2023-01-17 RX ORDER — ACETAMINOPHEN 500 MG
1000 TABLET ORAL EVERY 6 HOURS PRN
Qty: 40 TABLET | Refills: 0 | Status: SHIPPED | OUTPATIENT
Start: 2023-01-17

## 2023-01-17 RX ORDER — ACETAMINOPHEN 325 MG/1
975 TABLET ORAL ONCE
Status: COMPLETED | OUTPATIENT
Start: 2023-01-17 | End: 2023-01-17

## 2023-01-17 RX ADMIN — DICLOFENAC SODIUM TOPICAL GEL, 1%, 2 G: 10 GEL TOPICAL at 14:51

## 2023-01-17 RX ADMIN — ACETAMINOPHEN 975 MG: 325 TABLET, FILM COATED ORAL at 14:51

## 2023-01-17 NOTE — DISCHARGE INSTRUCTIONS
Come back to the emergency department for new or worsening symptoms including spreading redness, pus discharge, fevers  Follow-up with orthopedic surgery if you have continued pain in 1 week for reevaluation/further x-rays  X-rays showed no fracture per my read of the x-ray  If radiology notes a fracture you will be called immediately
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
## 2023-01-19 ENCOUNTER — OFFICE VISIT (OUTPATIENT)
Dept: NEPHROLOGY | Facility: CLINIC | Age: 58
End: 2023-01-19

## 2023-01-19 VITALS
SYSTOLIC BLOOD PRESSURE: 172 MMHG | WEIGHT: 238 LBS | BODY MASS INDEX: 37.35 KG/M2 | HEIGHT: 67 IN | DIASTOLIC BLOOD PRESSURE: 86 MMHG

## 2023-01-19 DIAGNOSIS — I12.9 BENIGN HYPERTENSION WITH CKD (CHRONIC KIDNEY DISEASE) STAGE III (HCC): Primary | ICD-10-CM

## 2023-01-19 DIAGNOSIS — N18.30 BENIGN HYPERTENSION WITH CKD (CHRONIC KIDNEY DISEASE) STAGE III (HCC): Primary | ICD-10-CM

## 2023-01-19 DIAGNOSIS — N18.32 STAGE 3B CHRONIC KIDNEY DISEASE (HCC): ICD-10-CM

## 2023-01-19 DIAGNOSIS — N28.1 RENAL CYST: ICD-10-CM

## 2023-01-19 DIAGNOSIS — N20.0 NEPHROLITHIASIS: ICD-10-CM

## 2023-01-19 PROBLEM — I10 ESSENTIAL HYPERTENSION: Status: RESOLVED | Noted: 2022-10-13 | Resolved: 2023-01-19

## 2023-01-19 PROBLEM — N18.4 STAGE 4 CHRONIC KIDNEY DISEASE (HCC): Status: RESOLVED | Noted: 2018-12-26 | Resolved: 2023-01-19

## 2023-01-19 PROBLEM — N17.9 ACUTE RENAL FAILURE SUPERIMPOSED ON STAGE 3B CHRONIC KIDNEY DISEASE (HCC): Status: RESOLVED | Noted: 2023-01-14 | Resolved: 2023-01-19
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
## 2023-02-10 ENCOUNTER — OFFICE VISIT (OUTPATIENT)
Dept: CARDIOLOGY CLINIC | Facility: CLINIC | Age: 58
End: 2023-02-10

## 2023-02-10 VITALS
WEIGHT: 234.8 LBS | BODY MASS INDEX: 36.85 KG/M2 | DIASTOLIC BLOOD PRESSURE: 68 MMHG | SYSTOLIC BLOOD PRESSURE: 134 MMHG | HEART RATE: 60 BPM | HEIGHT: 67 IN

## 2023-02-10 DIAGNOSIS — I48.91 ATRIAL FIBRILLATION WITH RAPID VENTRICULAR RESPONSE (HCC): Primary | ICD-10-CM

## 2023-02-10 NOTE — PROGRESS NOTES
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 valve peak reg* 12/21/2022 3 43    • Left ventricular stroke * 12/21/2022 69 00    • IVS 12/21/2022 1 7    • LEFT VENTRICLE SYSTOLIC * 46/61/6449 53    • LV DIASTOLIC VOLUME (MOD* 00/92/3136 122    • Left Atrium Area-systoli* 12/21/2022 25 9    • Left Atrium Area-systoli* 12/21/2022 24 6    • LVSV, 2D 12/21/2022 69    • LV EF 12/21/2022 65    • TSH 3RD GENERATON 12/21/2022 0 930        Imaging: I have personally reviewed pertinent reports

## 2023-02-26 DIAGNOSIS — R11.2 NAUSEA AND VOMITING, UNSPECIFIED VOMITING TYPE: ICD-10-CM

## 2023-02-26 RX ORDER — PANTOPRAZOLE SODIUM 40 MG/1
TABLET, DELAYED RELEASE ORAL
Qty: 30 TABLET | Refills: 2 | Status: SHIPPED | OUTPATIENT
Start: 2023-02-26

## 2023-03-09 NOTE — PROGRESS NOTES
Blood pressure check 2 weeks after starting entresto 49/51mg bid Xray already completed. No need for additional imaging.

## 2023-04-04 ENCOUNTER — HOSPITAL ENCOUNTER (INPATIENT)
Facility: HOSPITAL | Age: 58
LOS: 2 days | Discharge: HOME | End: 2023-04-06
Attending: EMERGENCY MEDICINE | Admitting: HOSPITALIST

## 2023-04-04 ENCOUNTER — APPOINTMENT (EMERGENCY)
Dept: RADIOLOGY | Facility: HOSPITAL | Age: 58
End: 2023-04-04

## 2023-04-04 DIAGNOSIS — I12.9 BENIGN HYPERTENSION WITH CKD (CHRONIC KIDNEY DISEASE) STAGE III (HCC): ICD-10-CM

## 2023-04-04 DIAGNOSIS — Z72.0 TOBACCO ABUSE: ICD-10-CM

## 2023-04-04 DIAGNOSIS — N18.32 STAGE 3B CHRONIC KIDNEY DISEASE (HCC): ICD-10-CM

## 2023-04-04 DIAGNOSIS — Z95.810 HISTORY OF IMPLANTABLE CARDIAC DEFIBRILLATOR (ICD): ICD-10-CM

## 2023-04-04 DIAGNOSIS — R07.9 CHEST PAIN, UNSPECIFIED: ICD-10-CM

## 2023-04-04 DIAGNOSIS — I48.0 PAROXYSMAL A-FIB (HCC): ICD-10-CM

## 2023-04-04 DIAGNOSIS — I50.42 CHRONIC COMBINED SYSTOLIC AND DIASTOLIC HEART FAILURE (HCC): ICD-10-CM

## 2023-04-04 DIAGNOSIS — Z45.02 ICD (IMPLANTABLE CARDIOVERTER-DEFIBRILLATOR) DISCHARGE: ICD-10-CM

## 2023-04-04 DIAGNOSIS — N18.30 BENIGN HYPERTENSION WITH CKD (CHRONIC KIDNEY DISEASE) STAGE III (HCC): ICD-10-CM

## 2023-04-04 DIAGNOSIS — R77.8 ELEVATED TROPONIN: Primary | ICD-10-CM

## 2023-04-04 DIAGNOSIS — R00.0 TACHYCARDIA: ICD-10-CM

## 2023-04-04 LAB
2HR DELTA HS TROPONIN: 16 NG/L
ABO GROUP BLD: NORMAL
ABO GROUP BLD: NORMAL
ALBUMIN SERPL BCP-MCNC: 4.2 G/DL (ref 3.5–5)
ALP SERPL-CCNC: 65 U/L (ref 34–104)
ALT SERPL W P-5'-P-CCNC: 15 U/L (ref 7–52)
ANION GAP SERPL CALCULATED.3IONS-SCNC: 11 MMOL/L (ref 4–13)
APTT PPP: 29 SECONDS (ref 23–37)
AST SERPL W P-5'-P-CCNC: 18 U/L (ref 13–39)
BASOPHILS # BLD AUTO: 0.03 THOUSANDS/ÂΜL (ref 0–0.1)
BASOPHILS NFR BLD AUTO: 0 % (ref 0–1)
BILIRUB SERPL-MCNC: 0.54 MG/DL (ref 0.2–1)
BLD GP AB SCN SERPL QL: NEGATIVE
BUN SERPL-MCNC: 22 MG/DL (ref 5–25)
CALCIUM SERPL-MCNC: 9.9 MG/DL (ref 8.4–10.2)
CARDIAC TROPONIN I PNL SERPL HS: 114 NG/L
CARDIAC TROPONIN I PNL SERPL HS: 130 NG/L
CHLORIDE SERPL-SCNC: 107 MMOL/L (ref 96–108)
CO2 SERPL-SCNC: 23 MMOL/L (ref 21–32)
CREAT SERPL-MCNC: 1.95 MG/DL (ref 0.6–1.3)
EOSINOPHIL # BLD AUTO: 0.05 THOUSAND/ÂΜL (ref 0–0.61)
EOSINOPHIL NFR BLD AUTO: 1 % (ref 0–6)
ERYTHROCYTE [DISTWIDTH] IN BLOOD BY AUTOMATED COUNT: 15.7 % (ref 11.6–15.1)
GFR SERPL CREATININE-BSD FRML MDRD: 27 ML/MIN/1.73SQ M
GLUCOSE SERPL-MCNC: 118 MG/DL (ref 65–140)
HCT VFR BLD AUTO: 41.7 % (ref 34.8–46.1)
HGB BLD-MCNC: 12.8 G/DL (ref 11.5–15.4)
IMM GRANULOCYTES # BLD AUTO: 0.02 THOUSAND/UL (ref 0–0.2)
IMM GRANULOCYTES NFR BLD AUTO: 0 % (ref 0–2)
INR PPP: 1.22 (ref 0.84–1.19)
LYMPHOCYTES # BLD AUTO: 2.19 THOUSANDS/ÂΜL (ref 0.6–4.47)
LYMPHOCYTES NFR BLD AUTO: 30 % (ref 14–44)
MCH RBC QN AUTO: 25 PG (ref 26.8–34.3)
MCHC RBC AUTO-ENTMCNC: 30.7 G/DL (ref 31.4–37.4)
MCV RBC AUTO: 82 FL (ref 82–98)
MONOCYTES # BLD AUTO: 0.51 THOUSAND/ÂΜL (ref 0.17–1.22)
MONOCYTES NFR BLD AUTO: 7 % (ref 4–12)
NEUTROPHILS # BLD AUTO: 4.55 THOUSANDS/ÂΜL (ref 1.85–7.62)
NEUTS SEG NFR BLD AUTO: 62 % (ref 43–75)
NRBC BLD AUTO-RTO: 0 /100 WBCS
PLATELET # BLD AUTO: 179 THOUSANDS/UL (ref 149–390)
PMV BLD AUTO: 11.2 FL (ref 8.9–12.7)
POTASSIUM SERPL-SCNC: 3.8 MMOL/L (ref 3.5–5.3)
PROT SERPL-MCNC: 7.9 G/DL (ref 6.4–8.4)
PROTHROMBIN TIME: 15.7 SECONDS (ref 11.6–14.5)
RBC # BLD AUTO: 5.11 MILLION/UL (ref 3.81–5.12)
RH BLD: POSITIVE
RH BLD: POSITIVE
SODIUM SERPL-SCNC: 141 MMOL/L (ref 135–147)
SPECIMEN EXPIRATION DATE: NORMAL
WBC # BLD AUTO: 7.35 THOUSAND/UL (ref 4.31–10.16)

## 2023-04-04 RX ORDER — AMLODIPINE BESYLATE 5 MG/1
5 TABLET ORAL 2 TIMES DAILY
Status: DISCONTINUED | OUTPATIENT
Start: 2023-04-04 | End: 2023-04-05

## 2023-04-04 RX ORDER — NITROGLYCERIN 0.3 MG/1
0.3 TABLET SUBLINGUAL
Status: DISCONTINUED | OUTPATIENT
Start: 2023-04-04 | End: 2023-04-06 | Stop reason: HOSPADM

## 2023-04-04 RX ORDER — DOXAZOSIN MESYLATE 1 MG/1
2 TABLET ORAL
Status: DISCONTINUED | OUTPATIENT
Start: 2023-04-04 | End: 2023-04-06 | Stop reason: HOSPADM

## 2023-04-04 RX ORDER — ACETAMINOPHEN 325 MG/1
975 TABLET ORAL EVERY 8 HOURS PRN
Status: DISCONTINUED | OUTPATIENT
Start: 2023-04-04 | End: 2023-04-06 | Stop reason: HOSPADM

## 2023-04-04 RX ORDER — ATORVASTATIN CALCIUM 40 MG/1
80 TABLET, FILM COATED ORAL EVERY EVENING
Status: DISCONTINUED | OUTPATIENT
Start: 2023-04-04 | End: 2023-04-06 | Stop reason: HOSPADM

## 2023-04-04 RX ORDER — SODIUM CHLORIDE 9 MG/ML
75 INJECTION, SOLUTION INTRAVENOUS CONTINUOUS
Status: CANCELLED | OUTPATIENT
Start: 2023-04-04

## 2023-04-04 RX ORDER — FUROSEMIDE 40 MG/1
40 TABLET ORAL DAILY
Status: DISCONTINUED | OUTPATIENT
Start: 2023-04-05 | End: 2023-04-06 | Stop reason: HOSPADM

## 2023-04-04 RX ORDER — METOPROLOL TARTRATE 5 MG/5ML
5 INJECTION INTRAVENOUS ONCE
Status: COMPLETED | OUTPATIENT
Start: 2023-04-04 | End: 2023-04-04

## 2023-04-04 RX ORDER — ONDANSETRON 2 MG/ML
4 INJECTION INTRAMUSCULAR; INTRAVENOUS ONCE
Status: COMPLETED | OUTPATIENT
Start: 2023-04-04 | End: 2023-04-04

## 2023-04-04 RX ORDER — LISINOPRIL 20 MG/1
20 TABLET ORAL DAILY
Status: DISCONTINUED | OUTPATIENT
Start: 2023-04-05 | End: 2023-04-06 | Stop reason: HOSPADM

## 2023-04-04 RX ORDER — ACETAMINOPHEN 325 MG/1
975 TABLET ORAL ONCE
Status: COMPLETED | OUTPATIENT
Start: 2023-04-04 | End: 2023-04-04

## 2023-04-04 RX ORDER — ONDANSETRON 2 MG/ML
4 INJECTION INTRAMUSCULAR; INTRAVENOUS EVERY 6 HOURS PRN
Status: CANCELLED | OUTPATIENT
Start: 2023-04-04

## 2023-04-04 RX ORDER — PANTOPRAZOLE SODIUM 40 MG/1
40 TABLET, DELAYED RELEASE ORAL
Status: DISCONTINUED | OUTPATIENT
Start: 2023-04-05 | End: 2023-04-06 | Stop reason: HOSPADM

## 2023-04-04 RX ADMIN — ACETAMINOPHEN 975 MG: 325 TABLET ORAL at 20:37

## 2023-04-04 RX ADMIN — METOROPROLOL TARTRATE 5 MG: 5 INJECTION, SOLUTION INTRAVENOUS at 20:12

## 2023-04-04 RX ADMIN — AMLODIPINE BESYLATE 5 MG: 5 TABLET ORAL at 23:45

## 2023-04-04 RX ADMIN — DOXAZOSIN 2 MG: 1 TABLET ORAL at 23:45

## 2023-04-04 RX ADMIN — METOROPROLOL TARTRATE 5 MG: 5 INJECTION, SOLUTION INTRAVENOUS at 20:41

## 2023-04-04 RX ADMIN — ONDANSETRON 4 MG: 2 INJECTION INTRAMUSCULAR; INTRAVENOUS at 20:39

## 2023-04-04 RX ADMIN — SODIUM CHLORIDE, SODIUM LACTATE, POTASSIUM CHLORIDE, AND CALCIUM CHLORIDE 1000 ML: .6; .31; .03; .02 INJECTION, SOLUTION INTRAVENOUS at 20:15

## 2023-04-04 RX ADMIN — METOPROLOL SUCCINATE 150 MG: 100 TABLET, EXTENDED RELEASE ORAL at 23:45

## 2023-04-05 ENCOUNTER — APPOINTMENT (INPATIENT)
Dept: NON INVASIVE DIAGNOSTICS | Facility: HOSPITAL | Age: 58
End: 2023-04-05

## 2023-04-05 PROBLEM — I10 HYPERTENSION: Status: ACTIVE | Noted: 2022-04-08

## 2023-04-05 PROBLEM — D69.6 THROMBOCYTOPENIA (HCC): Status: RESOLVED | Noted: 2021-03-11 | Resolved: 2023-04-05

## 2023-04-05 LAB
4HR DELTA HS TROPONIN: 0 NG/L
ALBUMIN SERPL BCP-MCNC: 3.6 G/DL (ref 3.5–5)
ALP SERPL-CCNC: 54 U/L (ref 34–104)
ALT SERPL W P-5'-P-CCNC: 12 U/L (ref 7–52)
ANION GAP SERPL CALCULATED.3IONS-SCNC: 5 MMOL/L (ref 4–13)
AORTIC ROOT: 3.4 CM
APICAL FOUR CHAMBER EJECTION FRACTION: 49 %
APTT PPP: 31 SECONDS (ref 23–37)
APTT PPP: 40 SECONDS (ref 23–37)
ASCENDING AORTA: 2.9 CM
AST SERPL W P-5'-P-CCNC: 17 U/L (ref 13–39)
ATRIAL RATE: 132 BPM
ATRIAL RATE: 75 BPM
BASOPHILS # BLD AUTO: 0.02 THOUSANDS/ÂΜL (ref 0–0.1)
BASOPHILS NFR BLD AUTO: 0 % (ref 0–1)
BILIRUB SERPL-MCNC: 0.5 MG/DL (ref 0.2–1)
BUN SERPL-MCNC: 22 MG/DL (ref 5–25)
CALCIUM SERPL-MCNC: 9 MG/DL (ref 8.4–10.2)
CARDIAC TROPONIN I PNL SERPL HS: 114 NG/L
CARDIAC TROPONIN I PNL SERPL HS: 119 NG/L (ref 8–18)
CHLORIDE SERPL-SCNC: 107 MMOL/L (ref 96–108)
CHOLEST SERPL-MCNC: 125 MG/DL
CO2 SERPL-SCNC: 27 MMOL/L (ref 21–32)
CREAT SERPL-MCNC: 1.68 MG/DL (ref 0.6–1.3)
E WAVE DECELERATION TIME: 141 MS
EOSINOPHIL # BLD AUTO: 0.07 THOUSAND/ÂΜL (ref 0–0.61)
EOSINOPHIL NFR BLD AUTO: 1 % (ref 0–6)
ERYTHROCYTE [DISTWIDTH] IN BLOOD BY AUTOMATED COUNT: 15.6 % (ref 11.6–15.1)
ERYTHROCYTE [DISTWIDTH] IN BLOOD BY AUTOMATED COUNT: 15.7 % (ref 11.6–15.1)
EST. AVERAGE GLUCOSE BLD GHB EST-MCNC: 108 MG/DL
FRACTIONAL SHORTENING: 36 % (ref 28–44)
GFR SERPL CREATININE-BSD FRML MDRD: 33 ML/MIN/1.73SQ M
GLUCOSE SERPL-MCNC: 89 MG/DL (ref 65–140)
HBA1C MFR BLD: 5.4 %
HCT VFR BLD AUTO: 37.7 % (ref 34.8–46.1)
HCT VFR BLD AUTO: 37.8 % (ref 34.8–46.1)
HDLC SERPL-MCNC: 40 MG/DL
HGB BLD-MCNC: 11.5 G/DL (ref 11.5–15.4)
HGB BLD-MCNC: 11.5 G/DL (ref 11.5–15.4)
IMM GRANULOCYTES # BLD AUTO: 0.02 THOUSAND/UL (ref 0–0.2)
IMM GRANULOCYTES NFR BLD AUTO: 0 % (ref 0–2)
INR PPP: 1.22 (ref 0.84–1.19)
INTERVENTRICULAR SEPTUM IN DIASTOLE (PARASTERNAL SHORT AXIS VIEW): 2.5 CM
INTERVENTRICULAR SEPTUM: 2.5 CM (ref 0.6–1.1)
LAAS-AP2: 19.5 CM2
LAAS-AP4: 14 CM2
LDLC SERPL CALC-MCNC: 68 MG/DL (ref 0–100)
LEFT ATRIUM SIZE: 4.7 CM
LEFT INTERNAL DIMENSION IN SYSTOLE: 2.3 CM (ref 2.1–4)
LEFT VENTRICULAR INTERNAL DIMENSION IN DIASTOLE: 3.6 CM (ref 3.5–6)
LEFT VENTRICULAR POSTERIOR WALL IN END DIASTOLE: 1.3 CM
LEFT VENTRICULAR STROKE VOLUME: 34 ML
LVSV (TEICH): 34 ML
LYMPHOCYTES # BLD AUTO: 1.53 THOUSANDS/ÂΜL (ref 0.6–4.47)
LYMPHOCYTES NFR BLD AUTO: 31 % (ref 14–44)
MAGNESIUM SERPL-MCNC: 2.5 MG/DL (ref 1.9–2.7)
MCH RBC QN AUTO: 25.1 PG (ref 26.8–34.3)
MCH RBC QN AUTO: 25.2 PG (ref 26.8–34.3)
MCHC RBC AUTO-ENTMCNC: 30.4 G/DL (ref 31.4–37.4)
MCHC RBC AUTO-ENTMCNC: 30.5 G/DL (ref 31.4–37.4)
MCV RBC AUTO: 83 FL (ref 82–98)
MCV RBC AUTO: 83 FL (ref 82–98)
MONOCYTES # BLD AUTO: 0.37 THOUSAND/ÂΜL (ref 0.17–1.22)
MONOCYTES NFR BLD AUTO: 7 % (ref 4–12)
MV E'TISSUE VEL-SEP: 4 CM/S
MV PEAK A VEL: 0.43 M/S
MV PEAK E VEL: 90 CM/S
MV STENOSIS PRESSURE HALF TIME: 41 MS
MV VALVE AREA P 1/2 METHOD: 5.37 CM2
NEUTROPHILS # BLD AUTO: 2.96 THOUSANDS/ÂΜL (ref 1.85–7.62)
NEUTS SEG NFR BLD AUTO: 61 % (ref 43–75)
NRBC BLD AUTO-RTO: 0 /100 WBCS
P AXIS: 88 DEGREES
PLATELET # BLD AUTO: 129 THOUSANDS/UL (ref 149–390)
PLATELET # BLD AUTO: 133 THOUSANDS/UL (ref 149–390)
PMV BLD AUTO: 11.5 FL (ref 8.9–12.7)
PMV BLD AUTO: 11.8 FL (ref 8.9–12.7)
POTASSIUM SERPL-SCNC: 4.1 MMOL/L (ref 3.5–5.3)
PR INTERVAL: 92 MS
PROT SERPL-MCNC: 6.7 G/DL (ref 6.4–8.4)
PROTHROMBIN TIME: 15.6 SECONDS (ref 11.6–14.5)
QRS AXIS: -64 DEGREES
QRS AXIS: -73 DEGREES
QRSD INTERVAL: 162 MS
QRSD INTERVAL: 176 MS
QT INTERVAL: 364 MS
QT INTERVAL: 392 MS
QTC INTERVAL: 539 MS
QTC INTERVAL: 613 MS
RBC # BLD AUTO: 4.56 MILLION/UL (ref 3.81–5.12)
RBC # BLD AUTO: 4.58 MILLION/UL (ref 3.81–5.12)
RIGHT ATRIUM AREA SYSTOLE A4C: 12.4 CM2
RIGHT VENTRICLE ID DIMENSION: 4 CM
SL CV LEFT ATRIUM LENGTH A2C: 5.5 CM
SL CV LV EF: 65
SL CV PED ECHO LEFT VENTRICLE DIASTOLIC VOLUME (MOD BIPLANE) 2D: 53 ML
SL CV PED ECHO LEFT VENTRICLE SYSTOLIC VOLUME (MOD BIPLANE) 2D: 19 ML
SODIUM SERPL-SCNC: 139 MMOL/L (ref 135–147)
T WAVE AXIS: 103 DEGREES
T WAVE AXIS: 114 DEGREES
T4 FREE SERPL-MCNC: 1.16 NG/DL (ref 0.76–1.46)
TR MAX PG: 20 MMHG
TR PEAK VELOCITY: 2.2 M/S
TRICUSPID ANNULAR PLANE SYSTOLIC EXCURSION: 1.3 CM
TRICUSPID VALVE PEAK REGURGITATION VELOCITY: 2.24 M/S
TRIGL SERPL-MCNC: 83 MG/DL
TSH SERPL DL<=0.05 MIU/L-ACNC: 3.21 UIU/ML (ref 0.45–4.5)
VENTRICULAR RATE: 132 BPM
VENTRICULAR RATE: 147 BPM
WBC # BLD AUTO: 4.97 THOUSAND/UL (ref 4.31–10.16)
WBC # BLD AUTO: 5.5 THOUSAND/UL (ref 4.31–10.16)

## 2023-04-05 RX ORDER — DILTIAZEM HYDROCHLORIDE 60 MG/1
60 TABLET, FILM COATED ORAL EVERY 6 HOURS SCHEDULED
Status: DISCONTINUED | OUTPATIENT
Start: 2023-04-05 | End: 2023-04-06

## 2023-04-05 RX ORDER — METOCLOPRAMIDE HYDROCHLORIDE 5 MG/ML
10 INJECTION INTRAMUSCULAR; INTRAVENOUS EVERY 8 HOURS SCHEDULED
Status: DISCONTINUED | OUTPATIENT
Start: 2023-04-05 | End: 2023-04-05

## 2023-04-05 RX ORDER — MAGNESIUM SULFATE HEPTAHYDRATE 40 MG/ML
2 INJECTION, SOLUTION INTRAVENOUS ONCE
Status: COMPLETED | OUTPATIENT
Start: 2023-04-05 | End: 2023-04-05

## 2023-04-05 RX ORDER — HEPARIN SODIUM 10000 [USP'U]/100ML
3-20 INJECTION, SOLUTION INTRAVENOUS
Status: DISCONTINUED | OUTPATIENT
Start: 2023-04-05 | End: 2023-04-05

## 2023-04-05 RX ORDER — DIPHENHYDRAMINE HYDROCHLORIDE 50 MG/ML
25 INJECTION INTRAMUSCULAR; INTRAVENOUS ONCE
Status: COMPLETED | OUTPATIENT
Start: 2023-04-05 | End: 2023-04-05

## 2023-04-05 RX ORDER — OXYCODONE HYDROCHLORIDE 5 MG/1
5 TABLET ORAL EVERY 6 HOURS PRN
Status: DISCONTINUED | OUTPATIENT
Start: 2023-04-05 | End: 2023-04-06 | Stop reason: HOSPADM

## 2023-04-05 RX ORDER — METOCLOPRAMIDE HYDROCHLORIDE 5 MG/ML
10 INJECTION INTRAMUSCULAR; INTRAVENOUS ONCE
Status: COMPLETED | OUTPATIENT
Start: 2023-04-05 | End: 2023-04-05

## 2023-04-05 RX ORDER — METOPROLOL TARTRATE 5 MG/5ML
5 INJECTION INTRAVENOUS ONCE
Status: COMPLETED | OUTPATIENT
Start: 2023-04-05 | End: 2023-04-05

## 2023-04-05 RX ORDER — DIPHENHYDRAMINE HYDROCHLORIDE 50 MG/ML
25 INJECTION INTRAMUSCULAR; INTRAVENOUS EVERY 8 HOURS PRN
Status: DISCONTINUED | OUTPATIENT
Start: 2023-04-05 | End: 2023-04-05

## 2023-04-05 RX ORDER — HYDROMORPHONE HCL IN WATER/PF 6 MG/30 ML
0.2 PATIENT CONTROLLED ANALGESIA SYRINGE INTRAVENOUS
Status: DISCONTINUED | OUTPATIENT
Start: 2023-04-05 | End: 2023-04-06 | Stop reason: HOSPADM

## 2023-04-05 RX ORDER — POTASSIUM CHLORIDE 20 MEQ/1
40 TABLET, EXTENDED RELEASE ORAL ONCE
Status: COMPLETED | OUTPATIENT
Start: 2023-04-05 | End: 2023-04-05

## 2023-04-05 RX ADMIN — DIPHENHYDRAMINE HYDROCHLORIDE 25 MG: 50 INJECTION, SOLUTION INTRAMUSCULAR; INTRAVENOUS at 02:07

## 2023-04-05 RX ADMIN — METOPROLOL SUCCINATE 150 MG: 100 TABLET, EXTENDED RELEASE ORAL at 22:56

## 2023-04-05 RX ADMIN — POTASSIUM CHLORIDE 40 MEQ: 1500 TABLET, EXTENDED RELEASE ORAL at 00:40

## 2023-04-05 RX ADMIN — HYDROMORPHONE HYDROCHLORIDE 0.2 MG: 0.2 INJECTION, SOLUTION INTRAMUSCULAR; INTRAVENOUS; SUBCUTANEOUS at 17:43

## 2023-04-05 RX ADMIN — METOCLOPRAMIDE 10 MG: 5 INJECTION, SOLUTION INTRAMUSCULAR; INTRAVENOUS at 02:07

## 2023-04-05 RX ADMIN — RIVAROXABAN 15 MG: 15 TABLET, FILM COATED ORAL at 17:43

## 2023-04-05 RX ADMIN — LISINOPRIL 20 MG: 20 TABLET ORAL at 08:50

## 2023-04-05 RX ADMIN — HEPARIN SODIUM 11.1 UNITS/KG/HR: 10000 INJECTION, SOLUTION INTRAVENOUS at 00:43

## 2023-04-05 RX ADMIN — DOXAZOSIN 2 MG: 1 TABLET ORAL at 22:56

## 2023-04-05 RX ADMIN — FUROSEMIDE 40 MG: 40 TABLET ORAL at 08:50

## 2023-04-05 RX ADMIN — ATORVASTATIN CALCIUM 80 MG: 40 TABLET, FILM COATED ORAL at 17:43

## 2023-04-05 RX ADMIN — AMLODIPINE BESYLATE 5 MG: 5 TABLET ORAL at 08:50

## 2023-04-05 RX ADMIN — DILTIAZEM HYDROCHLORIDE 60 MG: 60 TABLET, FILM COATED ORAL at 17:43

## 2023-04-05 RX ADMIN — METOPROLOL TARTRATE 5 MG: 5 INJECTION INTRAVENOUS at 02:07

## 2023-04-05 RX ADMIN — OXYCODONE HYDROCHLORIDE 5 MG: 5 TABLET ORAL at 15:28

## 2023-04-05 RX ADMIN — ATORVASTATIN CALCIUM 80 MG: 40 TABLET, FILM COATED ORAL at 00:01

## 2023-04-05 RX ADMIN — OXYCODONE HYDROCHLORIDE 5 MG: 5 TABLET ORAL at 08:54

## 2023-04-05 RX ADMIN — MAGNESIUM SULFATE HEPTAHYDRATE 2 G: 40 INJECTION, SOLUTION INTRAVENOUS at 00:43

## 2023-04-05 RX ADMIN — PANTOPRAZOLE SODIUM 40 MG: 40 TABLET, DELAYED RELEASE ORAL at 05:33

## 2023-04-05 RX ADMIN — NICOTINE 1 PATCH: 7 PATCH, EXTENDED RELEASE TRANSDERMAL at 08:51

## 2023-04-05 RX ADMIN — DILTIAZEM HYDROCHLORIDE 60 MG: 60 TABLET, FILM COATED ORAL at 12:37

## 2023-04-05 RX ADMIN — METOPROLOL SUCCINATE 150 MG: 100 TABLET, EXTENDED RELEASE ORAL at 08:50

## 2023-04-05 NOTE — ASSESSMENT & PLAN NOTE
Wt Readings from Last 3 Encounters:   04/04/23 107 kg (235 lb 14 3 oz)   02/10/23 107 kg (234 lb 12 8 oz)   01/19/23 108 kg (238 lb)     Patient appears euvolemic  Patient does not complain of any shortness of breath, dyspnea on exertion or lower leg swelling      Plan:  See principal problem  Continue home beta-blocker  Continue home Lasix
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
## 2023-04-05 NOTE — ASSESSMENT & PLAN NOTE
Wt Readings from Last 3 Encounters:   04/05/23 107 kg (235 lb)   02/10/23 107 kg (234 lb 12 8 oz)   01/19/23 108 kg (238 lb)     Patient appears euvolemic  Patient does not complain of any shortness of breath, dyspnea on exertion or lower leg swelling      Plan:  · See principal problem  · Continue Lasix

## 2023-04-05 NOTE — ASSESSMENT & PLAN NOTE
Lab Results   Component Value Date    EGFR 28 04/06/2023    EGFR 33 04/05/2023    EGFR 27 04/04/2023    CREATININE 1 89 (H) 04/06/2023    CREATININE 1 68 (H) 04/05/2023    CREATININE 1 95 (H) 04/04/2023     Baseline creatinine 1 7 to 2 1 since mid 2021, 1 5 to 1 7 since early 2019, 1 3 in 2018, 1 1 going back to 2016    Plan:  · POA Cr 1 95 appears to be within his baseline    · Worsening CKD between stages III and IV  · Avoid nephrotoxic agents  · Patient reports allergies hives to iodine contrast  · Resumed home Lasix

## 2023-04-05 NOTE — UTILIZATION REVIEW
Initial Clinical Review    Admission: Date/Time/Statement:   Admission Orders (From admission, onward)     Ordered        04/04/23 2111  INPATIENT ADMISSION  Once                      Orders Placed This Encounter   Procedures   • INPATIENT ADMISSION     Standing Status:   Standing     Number of Occurrences:   1     Order Specific Question:   Level of Care     Answer:   Med Surg [16]     Order Specific Question:   Estimated length of stay     Answer:   More than 2 Midnights     Order Specific Question:   Certification     Answer:   I certify that inpatient services are medically necessary for this patient for a duration of greater than two midnights  See H&P and MD Progress Notes for additional information about the patient's course of treatment  ED Arrival Information     Expected   -    Arrival   4/4/2023 19:41    Acuity   Emergent            Means of arrival   Ambulance    [facility]    Service   Hospitalist    Admission type   Emergency            Arrival complaint   EMS-POSSIBLE MI           Chief Complaint   Patient presents with   • Chest Pain     Pt was at a park and had some chest pain with a syncopal episode  EMS gave 324 asa and 2 nitro  Initial Presentation: 62 y o  female presented to ED via EMS as inpatient admission for chest pain  Patient reports sudden onset of chest discomfort after emotional  distress  Patient was involved in a family dispute involving her grandchildren outside with police involved and on the scene  When she got up again to walk further she had a sensation of heartbeat and then passed out  She denies any chest pain, dizziness, lightheadedness, diaphoresis, or nausea/vomiting prior to the event  Her  told her she passed out for approximately 2 minutes before waking up  When she woke up she had a sensation of chest pain which she describes as someone sitting on her chest as well as some palpitations when she lays down  She also c/o fatigue  She called me the chest pain feels similar to when she had received a prior ICD shock placed on IV heparin for the elevated troponin and cardiology consultation requested for further evaluation and management of her chest pain and possible ICD discharge  ICD interrogation still pending  An updated echocardiogram has also been ordered PMH of combined CHF s/p ICD, afib on xarelto, HTN, CKD4, HLD, tobacco  Plan heparin gtt, started on high dose statin, home metoprolol,consult cardiology NPO and supportive care  Date:04-05-23  Have interrogated ICD appears patient was shocked due to A-fib with RVR  Patient has been started on oral Cardizem we will continue to monitor her on telemetry  Heparin drip started on admission now [address] in favor of Xarelto  Patient remains on metoprolol  mg twice daily  Cardiology consult:   Assessment  1  Chest pain with elevated troponin-most likely nonischemic myocardial injury in the setting of possible ICD discharge and tachyarrhythmia  Hs troponin flat and very minimally elevated-114-->130-->114  No anginal sounding symptoms leading up to event yesterday  [facility] 2019 showed minimal luminal irregularities without any obstructive coronary disease  Echocardiogram in 12/2022 showed LVEF 65% with mild concentric hypertrophy, normal wall motion, grade 2 diastolic dysfunction, normal RV size and function, mild MR, and trace TR     2   Syncope with possible ICD discharge  Device interrogation pending     Plan  1  Await ICD interrogation to determine if she truly received ICD discharge and, if so, for what rhythm  2  Add cardizem 60 mg q6 hours  3  No need for cardiac catheterization today as troponin elevation is minimal and most likely in the setting of tachyarrhythmia and possible ICD discharge  Will allow patient to eat  4   Continue IV heparin in place of Xarelto for now until we determine what other testing is needed  5   Keep K >4 and Mg >2  6    Continue to monitor on telemetry  7  Continue rest of cardiac medications including metoprolol succinate 150 mg BID, doxazosin 2 mg daily, furosemide 40 mg daily, and lisinopril 20 mg daily  04-06-23 cardioversion today  Recent stress test/Echo:   12/21/22 ECHO: LVEF 65%  SF NL  GII diastolic dysfunction  Pacer wire present  No recent stress test       • CXR: Pending official results, nothing acutely appreciated  • EKG: POA - Sinus tachy 132 with RBBB and left anterior fascicular block  • Likely non ST elevation MI vs stable angina  • BRITTA Score: 3  • Heart Score: 8     ECHO 4/5/23:   Left Ventricle: Left ventricular cavity size is normal  Wall thickness is increased  The endocardium is not well visualized and Definity was not used  There is mild concentric hypertrophy  There is severe asymmetric hypertrophy of the septal wall and possibly the apical walls  This may suggest HCM  The left ventricular ejection fraction is 65%  Systolic function is normal  Although no diagnostic regional wall motion abnormality was identified, this possibility cannot be completely excluded on the basis of this study  Tricuspid Valve:  There is mild regurgitation              ED Triage Vitals   Temperature Pulse Respirations Blood Pressure SpO2   04/04/23 1944 04/04/23 1944 04/04/23 1944 04/04/23 1944 04/04/23 1944   98 1 °F (36 7 °C) (!) 146 16 141/96 95 %      Temp Source Heart Rate Source Patient Position - Orthostatic VS BP Location FiO2 (%)   04/04/23 1944 04/04/23 2153 04/04/23 1944 04/04/23 1944 --   Oral Monitor Lying Right arm       Pain Score       04/04/23 2209       8          Wt Readings from Last 1 Encounters:   04/05/23 107 kg (235 lb)     Additional Vital Signs:    Date/Time Temp Pulse Resp BP MAP (mmHg) SpO2 O2 Device Patient Position - Orthostatic VS   04/05/23 1120 -- 135 Abnormal  -- 128/99 -- -- -- --   04/05/23 0050 -- 135 Abnormal  20 128/99 -- -- -- Lying   04/04/23 2212 -- -- -- 180/84 Abnormal  -- -- -- Lying   04/04/23 2210 97 7 °F (36 5 °C) 126 Abnormal  19 200/104 Abnormal  143 95 % None (Room air) Lying   04/04/23 2153 -- 128 Abnormal  16 140/97 112 96 % None (Room air) Lying   04/04/23 2130 -- 128 Abnormal  16 144/104 Abnormal  120 98 % -- --   04/04/23 2100 -- 129 Abnormal  16 140/96 115 97 % -- --   04/04/23 2045 -- 130 Abnormal  -- 145/104 Abnormal  120 96 % -- --   04/04/23 2015 -- 143 Abnormal  17 148/110 Abnormal  125 95 % None (Room air) --       Pertinent Labs/Diagnostic Test Results:   EKG 04-04-23   ECG rate:  147     ECG rate assessment: tachycardic     Rhythm:     Rhythm: sinus rhythm     QRS:     QRS axis:  Left     QRS intervals:  Wide   Conduction:     Conduction: abnormal       Abnormal conduction: complete RBBB, LAFB and bifascicular block     ST segments:     ST segments:  Non-specific   T waves:     T waves: non-specific     Other findings:     Other findings: LVH      EKG 04-05-23  ECG rate:  132     ECG rate assessment: normal     Rhythm:     Rhythm: sinus rhythm     Ectopy:     Ectopy: none     QRS:     QRS axis:  Normal     QRS intervals:  Normal   Conduction:     Conduction: abnormal       Abnormal conduction: complete RBBB and LAFB       Abnormal conduction comment:  Shortening of MN interval   Noted at 92  ST segments:     ST segments:  Normal   T waves:     T waves: normal     Comments:      QTc measured to be 539      ECHO 04-05-23     Left Ventricle: Left ventricular cavity size is normal  Wall thickness is increased  The endocardium is not well visualized and Definity was not used  There is mild concentric hypertrophy  There is severe asymmetric hypertrophy of the septal wall and possibly the apical walls  This may suggest HCM  The left ventricular ejection fraction is 65%  Systolic function is normal  Although no diagnostic regional wall motion abnormality was identified, this possibility cannot be completely excluded on the basis of this study  •  Tricuspid Valve:  There is mild regurgitation          XR chest 1 view portable    (04/05 0156)   Trachea midline  Cardiac silhouette appears consistent in caliber and size compared to previous radiographs  Bilateral costophrenic angles can be appreciated although it does appear hazy in bilateral lung fields  No focal consolidation, or opacity noted  No cristóbal osseous pathology noted on examination  Implanted cardiac device can be appreciated with wire placement  ECG leads also noted  Grossly unremarkable chest x-ray given patient's history of CHF and intrathoracic pathology in the past   Read by Raffy Hilliard  (04/05 0940)      No acute cardiopulmonary disease           Results from last 7 days   Lab Units 04/05/23 0533 04/05/23 0024 04/04/23 1948   WBC Thousand/uL 4 97 5 50 7 35   HEMOGLOBIN g/dL 11 5 11 5 12 8   HEMATOCRIT % 37 7 37 8 41 7   PLATELETS Thousands/uL 129* 133* 179   NEUTROS ABS Thousands/µL 2 96  --  4 55         Results from last 7 days   Lab Units 04/05/23 0533 04/04/23 1948   SODIUM mmol/L 139 141   POTASSIUM mmol/L 4 1 3 8   CHLORIDE mmol/L 107 107   CO2 mmol/L 27 23   ANION GAP mmol/L 5 11   BUN mg/dL 22 22   CREATININE mg/dL 1 68* 1 95*   EGFR ml/min/1 73sq m 33 27   CALCIUM mg/dL 9 0 9 9   MAGNESIUM mg/dL 2 5  --      Results from last 7 days   Lab Units 04/05/23 0533 04/04/23 1948   AST U/L 17 18   ALT U/L 12 15   ALK PHOS U/L 54 65   TOTAL PROTEIN g/dL 6 7 7 9   ALBUMIN g/dL 3 6 4 2   TOTAL BILIRUBIN mg/dL 0 50 0 54         Results from last 7 days   Lab Units 04/05/23  0533 04/04/23 1948   GLUCOSE RANDOM mg/dL 89 118         Results from last 7 days   Lab Units 04/04/23 1948   HEMOGLOBIN A1C % 5 4   EAG mg/dl 108       Results from last 7 days   Lab Units 04/05/23  0024 04/04/23 2223 04/04/23 1948   HS TNI 0HR ng/L  --   --  114*   HS TNI 2HR ng/L  --  130*  --    HSTNI D2 ng/L  --  16  --    HS TNI 4HR ng/L 114*  --   --    HSTNI D4 ng/L 0  --   --          Results from last 7 days   Lab Units 04/05/23  0533 04/05/23  0024 04/04/23 2011   PROTIME seconds  --  15 6* 15 7*   INR   --  1 22* 1 22*   PTT seconds 40* 31 29     Results from last 7 days   Lab Units 04/04/23 1948   TSH 3RD GENERATON uIU/mL 3 212       ED Treatment:   Medication Administration from 04/04/2023 1941 to 04/04/2023 2202       Date/Time Order Dose Route Action     04/04/2023 2015 EDT lactated ringers bolus 1,000 mL 1,000 mL Intravenous New Bag     04/04/2023 2012 EDT metoprolol (LOPRESSOR) injection 5 mg 5 mg Intravenous Given     04/04/2023 2041 EDT metoprolol (LOPRESSOR) injection 5 mg 5 mg Intravenous Given     04/04/2023 2037 EDT acetaminophen (TYLENOL) tablet 975 mg 975 mg Oral Given     04/04/2023 2039 EDT ondansetron (ZOFRAN) injection 4 mg 4 mg Intravenous Given        Past Medical History:   Diagnosis Date   • [facility] 2[facility]2021   • Arrhythmia    • Arthritis    • Atrial fibrillation (HCC)    • Atrial fibrillation with rapid ventricular response ([facility] ) 3/20/2016   • Breast lump    • CKD (chronic kidney disease) stage 3, GFR 30-59 ml/min (HCC)    • Disease of thyroid gland    • 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 ([facility] ) 5/25/2020   • GERD (gastroesophageal reflux disease)    • H/O transfusion 1987   • Hepatitis C     resolved   • Hepatitis C    • Hyperlipidemia    • Hypertension    • Irregular heart beat    • Pacemaker    • Sleep apnea     no cpap   • Tachycardia      Present on Admission:  • Chest pain  • Tobacco abuse  • Elevated troponin  • Paroxysmal A-fib (HCC)  • Benign hypertension with CKD (chronic kidney disease) stage III (HCC)  • Combined systolic and diastolic heart failure (HCC)      Admitting Diagnosis: Chest pain, unspecified [R07 9]  Chest pain [R07 9]  Tachycardia [R00 0]  Elevated troponin [R77 8]  History of implantable cardiac defibrillator (ICD) [Z95 810]  Age/Sex: 62 y o  female  Admission Orders:  Scheduled Medications:  atorvastatin, 80 mg, Oral, QPM  diltiazem, 60 mg, Oral, Q6H JEISON  doxazosin, 2 mg, Oral, HS  furosemide, 40 mg, Oral, Daily  lisinopril, 20 mg, Oral, Daily  metoprolol succinate, 150 mg, Oral, BID  nicotine, 1 patch, Transdermal, Daily  pantoprazole, 40 mg, Oral, Early Morning  rivaroxaban, 15 mg, Oral, QPM      Continuous IV Infusions:    heparin (porcine) 25,000 units in 0 45% NaCl 250 mL infusion   PRN Meds:  acetaminophen, 975 mg, Oral, Q8H PRN  HYDROmorphone, 0 2 mg, Intravenous, Q3H PRN  nitroglycerin, 0 3 mg, Sublingual, Q5 Min PRN  oxyCODONE, 5 mg, Oral, Q6H PRN  oxyCODONE, 2 5 mg, Oral, Q6H PRN        IP CONSULT TO CARDIOLOGY    Network Utilization Review Department  ATTENTION: Please call with any questions or concerns to [phone] and carefully listen to the prompts so that you are directed to the right person  All voicemails are confidential   Adriana Case all requests for admission clinical reviews, approved or denied determinations and any other requests to dedicated fax number below belonging to the campus where the patient is receiving treatment   List of dedicated fax numbers for the Facilities:  [address] DENIALS (Administrative/Medical Necessity) [phone]   [address] (Maternity/NICU/Pediatrics) [phone]   [address] [phone]   [facility] 77 [phone]   [address] [place] [address] [place] [address] 28 [phone] [address] [place] [facility] 134 815 [facility] [phone]

## 2023-04-05 NOTE — ASSESSMENT & PLAN NOTE
Lab Results   Component Value Date    EGFR 27 04/04/2023    EGFR 28 01/15/2023    EGFR 25 01/14/2023    CREATININE 1 95 (H) 04/04/2023    CREATININE 1 92 (H) 01/15/2023    CREATININE 2 08 (H) 01/14/2023     Baseline creatinine 1 7 to 2 1 since mid 2021, 1 5 to 1 7 since early 2019, 1 3 in 2018, 1 1 going back to 2016    Plan:  POA Cr 1 95 appears to be within his baseline    Worsening CKD between stages III and IV  Avoid nephrotoxic agents  Patient reports allergies hives to iodine contrast  Resumed home Lasix
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
## 2023-04-05 NOTE — ASSESSMENT & PLAN NOTE
Patient anticoagulated on Xarelto 15 mg nightly  Patient arrived tachycardic  Continues to be 120s  Received 1 L bolus in the ED without any changes to heart rate    Patient appears to be euvolemic    Plan:  · Continue home Xarelto  · D/c Heparin drip  · Continue home metoprolol 150 mg twice daily  · Telemetry

## 2023-04-05 NOTE — ASSESSMENT & PLAN NOTE
Patient anticoagulated on Xarelto 15 mg nightly  Patient arrived tachycardic  Continues to be 120s  Received 1 L bolus in the ED without any changes to heart rate    Patient appears to be euvolemic        Plan:  Hold home Xarelto  Heparin drip  Continue home metoprolol 150 mg twice daily  Telemetry

## 2023-04-05 NOTE — ED ATTENDING ATTESTATION
4/4/2023  IAvinash MD, saw and evaluated the patient  I have discussed the patient with the resident/non-physician practitioner and agree with the resident's/non-physician practitioner's findings, Plan of Care, and MDM as documented in the resident's/non-physician practitioner's note, except where noted  All available labs and Radiology studies were reviewed  I was present for key portions of any procedure(s) performed by the resident/non-physician practitioner and I was immediately available to provide assistance  At this point I agree with the current assessment done in the Emergency Department  I have conducted an independent evaluation of this patient a history and physical is as follows:    59-year-old female, presents with chest pain and syncope  Patient reports sudden onset of chest discomfort after emotional distress  On arrival to ED, patient awake and alert and in no distress  Heart rate noted to be 140s to 150s, atrial fibrillation  ED Course     Patient given IV Lopressor for rate control, serial EKGs reviewed  Patient to be admitted to hospitalist for further monitoring and evaluation      Critical Care Time  CriticalCare Time    Date/Time: 4/4/2023 8:08 PM  Performed by: Avinash Galaviz MD  Authorized by: Avinash Galaviz MD     Critical care provider statement:     Critical care time (minutes):  35    Critical care time was exclusive of:  Separately billable procedures and treating other patients    Critical care was necessary to treat or prevent imminent or life-threatening deterioration of the following conditions:  Cardiac failure    Critical care was time spent personally by me on the following activities:  Obtaining history from patient or surrogate, development of treatment plan with patient or surrogate, evaluation of patient's response to treatment, examination of patient, ordering and performing treatments and interventions, ordering and review of laboratory studies, ordering and review of radiographic studies, re-evaluation of patient's condition and review of old charts

## 2023-04-05 NOTE — ASSESSMENT & PLAN NOTE
Continue home Norvasc, Cardura, Toprol XL and lisinopril  Patient's BP on admission improved after nighttime home meds    Plan:  · Continue all medications except Norvasc
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
## 2023-04-05 NOTE — ASSESSMENT & PLAN NOTE
Lab Results   Component Value Date    HSTNI0 114 (H) 04/04/2023    HSTNI2 130 (H) 04/04/2023    HSTNID2 16 04/04/2023    HSTNI4 114 (H) 04/05/2023    HSTNID4 0 04/05/2023     Recent stress test/Echo:   12/21/22 ECHO: LVEF 65%  SF NL  GII diastolic dysfunction  Pacer wire present  No recent stress test      • CXR: Pending official results, nothing acutely appreciated  • EKG: POA - Sinus tachy 132 with RBBB and left anterior fascicular block  • Likely non ST elevation MI vs stable angina  • BRITTA Score: 3  • Heart Score: 8    ECHO 4/5/23:   Left Ventricle: Left ventricular cavity size is normal  Wall thickness is increased  The endocardium is not well visualized and Definity was not used  There is mild concentric hypertrophy  There is severe asymmetric hypertrophy of the septal wall and possibly the apical walls  This may suggest HCM  The left ventricular ejection fraction is 65%  Systolic function is normal  Although no diagnostic regional wall motion abnormality was identified, this possibility cannot be completely excluded on the basis of this study  Tricuspid Valve: There is mild regurgitation      Lipid panel: Chol 125, Trig 83, HDL 40, LDL 68  HbA1C: 5 4  TSH: 3 212  T4: 1 16    Plan:   • STAT EKG and troponin if chest pain, Telemetry for arrhythmias  • Nitroglycerin p r n , loaded with  mg en route to ED  • Xarelto restarted  • Started high dose statin  • Resumed home metoprolol  • Consulted cardiology - Recommendations appreciated  o Cardioversion today

## 2023-04-05 NOTE — PHYSICAL THERAPY NOTE
Physical Therapy Screen Note       04/05/23 1500   Note Type   Note type Screen   Additional Comments referral received for PT eval and tx  pt seen w/ Lauren OT  pt just walked in the halls independently w/o device  pt states having back pain and will be initiating outpatient PT to address this issue  pt reports having no concerns regarding discharge home from hospital and does not want to complete PT eval  Discontinue PT  notified Yessy SMITH of screening pt for inpatient PT services       Jessenia Nicely, PT

## 2023-04-05 NOTE — ED PROVIDER NOTES
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 Diagnoses: Left leg pain      amLODIPine (NORVASC) 5 mg tablet Take 1 tablet (5 mg total) by mouth 2 (two) times a day  Qty: 60 tablet, Refills: 0    Associated Diagnoses: Stage 3b chronic kidney disease (HCC)      Diclofenac Sodium (VOLTAREN) 1 % Apply 2 g topically every 6 (six) hours as needed (pain)  Qty: 100 g, Refills: 0    Associated Diagnoses: Left leg pain      doxazosin (CARDURA) 2 mg tablet take 1 tablet by mouth daily at bedtime  Qty: 30 tablet, Refills: 5    Associated Diagnoses: Benign hypertension with CKD (chronic kidney disease) stage III (HCC)      furosemide (LASIX) 40 mg tablet Take 1 tablet (40 mg total) by mouth daily  Qty: 30 tablet, Refills: 0    Associated Diagnoses: CHF exacerbation (HCC)      lisinopril (ZESTRIL) 20 mg tablet Take 1 tablet (20 mg total) by mouth daily Do not start before January 16, 2023    Qty: 30 tablet, Refills: 0    Associated Diagnoses: Hypertension, uncontrolled      metoprolol succinate (TOPROL-XL) 50 mg 24 hr tablet take 3 tablets by mouth twice a day  Qty: 180 tablet, Refills: 5    Associated Diagnoses: Paroxysmal A-fib (HCC)      oxyCODONE-acetaminophen (PERCOCET) 5-325 mg per tablet take 1 tablet by mouth every 6 hours if needed for SEVERE PAIN (    (REFER TO PRESCRIPTION NOTES)  pantoprazole (PROTONIX) 40 mg tablet take 1 tablet by mouth once daily  Qty: 30 tablet, Refills: 2    Associated Diagnoses: Nausea and vomiting, unspecified vomiting type      rivaroxaban (XARELTO) 15 mg tablet Take 1 tablet (15 mg total) by mouth every evening  Qty: 30 tablet, Refills: 11    Associated Diagnoses: Electrolyte abnormality; Stage 3b chronic kidney disease ([facility] )           No discharge procedures on file  PDMP Review       Value Time User    PDMP Reviewed  Yes 11/5/2022 12:20 AM Shanna Shultz MD           ED Provider  Attending physically available and evaluated Esther Tong  I managed the patient along with the ED Attending      Electronically Signed by         Chari Will MD  04/05/23 9093

## 2023-04-05 NOTE — ASSESSMENT & PLAN NOTE
Continue home Norvasc, Cardura, Toprol XL and lisinopril    Patient's BP on admission improved after nighttime home meds

## 2023-04-05 NOTE — ASSESSMENT & PLAN NOTE
Patient continues to smoke 1 pack/day  Encourage smoking cessation    Plan:  · Nicotine patch Pt remains free from falls and injury. Provided with PRN analgesic with positive results. Pt able to get up to bedside commode with walker and standby assist. Bed low and locked, call light within reach.
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
## 2023-04-05 NOTE — PLAN OF CARE
Problem: PAIN - ADULT  Goal: Verbalizes/displays adequate comfort level or baseline comfort level  Description: Interventions:  - Encourage patient to monitor pain and request assistance  - Assess pain using appropriate pain scale  - Administer analgesics based on type and severity of pain and evaluate response  - Implement non-pharmacological measures as appropriate and evaluate response  - Consider cultural and social influences on pain and pain management  - Notify physician/advanced practitioner if interventions unsuccessful or patient reports new pain  Outcome: Progressing     Problem: INFECTION - ADULT  Goal: Absence or prevention of progression during hospitalization  Description: INTERVENTIONS:  - Assess and monitor for signs and symptoms of infection  - Monitor lab/diagnostic results  - Monitor all insertion sites, i e  indwelling lines, tubes, and drains  - Monitor endotracheal if appropriate and nasal secretions for changes in amount and color  - [place] appropriate cooling/warming therapies per order  - Administer medications as ordered  - Instruct and encourage patient and family to use good hand hygiene technique  - Identify and instruct in appropriate isolation precautions for identified infection/condition  Outcome: Progressing  Goal: Absence of fever/infection during neutropenic period  Description: INTERVENTIONS:  - Monitor WBC    Outcome: Progressing     Problem: SAFETY ADULT  Goal: Patient will remain free of falls  Description: INTERVENTIONS:  - Educate patient/family on patient safety including physical limitations  - Instruct patient to call for assistance with activity   - Consult OT/PT to assist with strengthening/mobility   - Keep Call bell within reach  - Keep bed low and locked with side rails adjusted as appropriate  - Keep care items and personal belongings within reach  - Initiate and maintain comfort rounds  - Make Fall Risk Sign visible to staff  - Offer Toileting every 2 Hours, in advance of need  - Initiate/Maintain alarm  - Obtain necessary fall risk management equipment:   - Apply yellow socks and bracelet for high fall risk patients  - Consider moving patient to room near nurses station  Outcome: Progressing  Goal: Maintain or return to baseline ADL function  Description: INTERVENTIONS:  -  Assess patient's ability to carry out ADLs; assess patient's baseline for ADL function and identify physical deficits which impact ability to perform ADLs (bathing, care of mouth/teeth, toileting, grooming, dressing, etc )  - Assess/evaluate cause of self-care deficits   - Assess range of motion  - Assess patient's mobility; develop plan if impaired  - Assess patient's need for assistive devices and provide as appropriate  - Encourage maximum independence but intervene and supervise when necessary  - Involve family in performance of ADLs  - Assess for home care needs following discharge   - Consider OT consult to assist with ADL evaluation and planning for discharge  - Provide patient education as appropriate  Outcome: Progressing  Goal: Maintains/Returns to pre admission functional level  Description: INTERVENTIONS:  - Perform BMAT or MOVE assessment daily    - Set and communicate daily mobility goal to care team and patient/family/caregiver  - Collaborate with rehabilitation services on mobility goals if consulted  - Perform Range of Motion 2 times a day  - Reposition patient every 2 hours    - Dangle patient 2 times a day  - Stand patient 2 times a day  - Ambulate patient 2 times a day  - Out of bed to chair 2 times a day   - Out of bed for meals 2 times a day  - Out of bed for toileting  - Record patient progress and toleration of activity level   Outcome: Progressing     Problem: DISCHARGE PLANNING  Goal: Discharge to home or other facility with appropriate resources  Description: INTERVENTIONS:  - Identify barriers to discharge w/patient and caregiver  - Arrange for needed discharge resources and transportation as appropriate  - Identify discharge learning needs (meds, wound care, etc )  - Arrange for interpretive services to assist at discharge as needed  - Refer to Case Management Department for coordinating discharge planning if the patient needs post-hospital services based on physician/advanced practitioner order or complex needs related to functional status, cognitive ability, or social support system  Outcome: Progressing     Problem: Knowledge Deficit  Goal: Patient/family/caregiver demonstrates understanding of disease process, treatment plan, medications, and discharge instructions  Description: Complete learning assessment and assess knowledge base    Interventions:  - Provide teaching at level of understanding  - Provide teaching via preferred learning methods  Outcome: Progressing     Problem: CARDIOVASCULAR - ADULT  Goal: Maintains optimal cardiac output and hemodynamic stability  Description: INTERVENTIONS:  - Monitor I/O, vital signs and rhythm  - Monitor for S/S and trends of decreased cardiac output  - Administer and titrate ordered vasoactive medications to optimize hemodynamic stability  - Assess quality of pulses, skin color and temperature  - Assess for signs of decreased coronary artery perfusion  - Instruct patient to report change in severity of symptoms  Outcome: Progressing  Goal: Absence of cardiac dysrhythmias or at baseline rhythm  Description: INTERVENTIONS:  - Continuous cardiac monitoring, vital signs, obtain 12 lead EKG if ordered  - Administer antiarrhythmic and heart rate control medications as ordered  - Monitor electrolytes and administer replacement therapy as ordered  Outcome: Progressing

## 2023-04-05 NOTE — ASSESSMENT & PLAN NOTE
Lab Results   Component Value Date    HSTNI0 114 (H) 04/04/2023    HSTNI2 130 (H) 04/04/2023    HSTNID2 16 04/04/2023    HSTNI4 114 (H) 04/05/2023    HSTNID4 0 04/05/2023     Recent stress test/Echo:   12/21/22 ECHO: LVEF 65%  SF NL  GII diastolic dysfunction  Pacer wire present    No recent stress test      • CXR: Pending official results, nothing acutely appreciated  • EKG: POA - Sinus tachy 132 with RBBB and left anterior fascicular block  • Likely non ST elevation MI vs stable angina  • BRITTA Score: 3  • Heart Score: 8     Plan:   • STAT EKG and troponin if chest pain, Telemetry for arrhythmias  • ECHO  • Monitor Troponin Trend, added random HS troponin to AM  • Lipid panel, HbA1C, TSH, T4  • Nitroglycerin p r n , loaded with  mg en route to ED  • Heparin drip started, no plavix given  • Started high dose statin  • Resumed home metoprolol  • ICD- [employer], called for investigation  • Consulted cardiology  • NPO sips with meds

## 2023-04-06 ENCOUNTER — APPOINTMENT (OUTPATIENT)
Dept: NON INVASIVE DIAGNOSTICS | Facility: HOSPITAL | Age: 58
End: 2023-04-06
Attending: INTERNAL MEDICINE

## 2023-04-06 ENCOUNTER — ANESTHESIA (INPATIENT)
Dept: NON INVASIVE DIAGNOSTICS | Facility: HOSPITAL | Age: 58
End: 2023-04-06

## 2023-04-06 ENCOUNTER — ANESTHESIA EVENT (INPATIENT)
Dept: NON INVASIVE DIAGNOSTICS | Facility: HOSPITAL | Age: 58
End: 2023-04-06

## 2023-04-06 VITALS
HEIGHT: 67 IN | TEMPERATURE: 97.8 F | WEIGHT: 235 LBS | SYSTOLIC BLOOD PRESSURE: 113 MMHG | HEART RATE: 59 BPM | OXYGEN SATURATION: 95 % | BODY MASS INDEX: 36.88 KG/M2 | DIASTOLIC BLOOD PRESSURE: 66 MMHG | RESPIRATION RATE: 16 BRPM

## 2023-04-06 PROBLEM — R79.89 ELEVATED TROPONIN: Status: RESOLVED | Noted: 2019-09-09 | Resolved: 2023-04-06

## 2023-04-06 PROBLEM — R77.8 ELEVATED TROPONIN: Status: RESOLVED | Noted: 2019-09-09 | Resolved: 2023-04-06

## 2023-04-06 LAB
ANION GAP SERPL CALCULATED.3IONS-SCNC: 1 MMOL/L (ref 4–13)
BUN SERPL-MCNC: 26 MG/DL (ref 5–25)
CALCIUM SERPL-MCNC: 8.6 MG/DL (ref 8.4–10.2)
CHLORIDE SERPL-SCNC: 107 MMOL/L (ref 96–108)
CO2 SERPL-SCNC: 25 MMOL/L (ref 21–32)
CREAT SERPL-MCNC: 1.89 MG/DL (ref 0.6–1.3)
GFR SERPL CREATININE-BSD FRML MDRD: 28 ML/MIN/1.73SQ M
GLUCOSE SERPL-MCNC: 97 MG/DL (ref 65–140)
MAGNESIUM SERPL-MCNC: 2 MG/DL (ref 1.9–2.7)
POTASSIUM SERPL-SCNC: 4.3 MMOL/L (ref 3.5–5.3)
SODIUM SERPL-SCNC: 133 MMOL/L (ref 135–147)

## 2023-04-06 RX ORDER — SODIUM CHLORIDE 9 MG/ML
INJECTION, SOLUTION INTRAVENOUS CONTINUOUS PRN
Status: DISCONTINUED | OUTPATIENT
Start: 2023-04-06 | End: 2023-04-06

## 2023-04-06 RX ORDER — DILTIAZEM HYDROCHLORIDE 180 MG/1
180 CAPSULE, COATED, EXTENDED RELEASE ORAL DAILY
Qty: 30 CAPSULE | Refills: 0 | Status: SHIPPED | OUTPATIENT
Start: 2023-04-07 | End: 2023-05-07

## 2023-04-06 RX ORDER — PROPOFOL 10 MG/ML
INJECTION, EMULSION INTRAVENOUS AS NEEDED
Status: DISCONTINUED | OUTPATIENT
Start: 2023-04-06 | End: 2023-04-06

## 2023-04-06 RX ORDER — DILTIAZEM HYDROCHLORIDE 180 MG/1
180 CAPSULE, COATED, EXTENDED RELEASE ORAL DAILY
Status: DISCONTINUED | OUTPATIENT
Start: 2023-04-07 | End: 2023-04-06 | Stop reason: HOSPADM

## 2023-04-06 RX ADMIN — DILTIAZEM HYDROCHLORIDE 60 MG: 60 TABLET, FILM COATED ORAL at 12:58

## 2023-04-06 RX ADMIN — DILTIAZEM HYDROCHLORIDE 60 MG: 60 TABLET, FILM COATED ORAL at 05:24

## 2023-04-06 RX ADMIN — PROPOFOL 80 MG: 10 INJECTION, EMULSION INTRAVENOUS at 13:22

## 2023-04-06 RX ADMIN — PROPOFOL 20 MG: 10 INJECTION, EMULSION INTRAVENOUS at 13:23

## 2023-04-06 RX ADMIN — RIVAROXABAN 15 MG: 15 TABLET, FILM COATED ORAL at 17:34

## 2023-04-06 RX ADMIN — FUROSEMIDE 40 MG: 40 TABLET ORAL at 09:07

## 2023-04-06 RX ADMIN — DILTIAZEM HYDROCHLORIDE 60 MG: 60 TABLET, FILM COATED ORAL at 00:01

## 2023-04-06 RX ADMIN — SODIUM CHLORIDE: 0.9 INJECTION, SOLUTION INTRAVENOUS at 13:15

## 2023-04-06 RX ADMIN — PANTOPRAZOLE SODIUM 40 MG: 40 TABLET, DELAYED RELEASE ORAL at 05:23

## 2023-04-06 RX ADMIN — OXYCODONE HYDROCHLORIDE 5 MG: 5 TABLET ORAL at 00:08

## 2023-04-06 RX ADMIN — METOPROLOL SUCCINATE 150 MG: 100 TABLET, EXTENDED RELEASE ORAL at 09:07

## 2023-04-06 RX ADMIN — NICOTINE 1 PATCH: 7 PATCH, EXTENDED RELEASE TRANSDERMAL at 09:11

## 2023-04-06 RX ADMIN — HYDROMORPHONE HYDROCHLORIDE 0.2 MG: 0.2 INJECTION, SOLUTION INTRAMUSCULAR; INTRAVENOUS; SUBCUTANEOUS at 05:23

## 2023-04-06 RX ADMIN — ATORVASTATIN CALCIUM 80 MG: 40 TABLET, FILM COATED ORAL at 17:34

## 2023-04-06 RX ADMIN — OXYCODONE HYDROCHLORIDE 5 MG: 5 TABLET ORAL at 13:55

## 2023-04-06 RX ADMIN — LISINOPRIL 20 MG: 20 TABLET ORAL at 09:07

## 2023-04-06 RX ADMIN — ACETAMINOPHEN 975 MG: 325 TABLET ORAL at 15:03

## 2023-04-06 NOTE — ASSESSMENT & PLAN NOTE
Patient continues to smoke 1 pack/day  Encourage smoking cessation    Plan:  · Encourage smoking cessation

## 2023-04-06 NOTE — ANESTHESIA PREPROCEDURE EVALUATION
Procedure:  CARDIOVERSION    Relevant Problems   CARDIO   (+) Acute on chronic heart failure with preserved ejection fraction (HCC)   (+) Atrial flutter with RVR   (+) Atypical atrial flutter (HCC)   (+) Chest pain   (+) History of ventricular tachycardia (HCC) with ICD   (+) Hypertension   (+) NSTEMI (non-ST elevated myocardial infarction) (HCC)   (+) Paroxysmal A-fib (HCC)      GI/HEPATIC   (+) Gastroesophageal reflux disease    (+) Hepatitis C      /RENAL   (+) Benign hypertension with CKD (chronic kidney disease) stage III (HCC)   (+) CKD (chronic kidney disease) stage 3, GFR 30-59 ml/min (HCC)   (+) Nephrolithiasis   (+) Renal cyst      HEMATOLOGY   (+) Anemia of chronic disease      MUSCULOSKELETAL   (+) Left low back pain      NEURO/PSYCH   (+) Headache      04/05/2023:  •  Left Ventricle: Left ventricular cavity size is normal  Wall thickness is increased  The endocardium is not well visualized and Definity was not used  There is mild concentric hypertrophy  There is severe asymmetric hypertrophy of the septal wall and possibly the apical walls  This may suggest HCM  The left ventricular ejection fraction is 65%  Systolic function is normal  Although no diagnostic regional wall motion abnormality was identified, this possibility cannot be completely excluded on the basis of this study  •  Tricuspid Valve: There is mild regurgitation        Physical Exam    Airway    Mallampati score: III  TM Distance: >3 FB  Neck ROM: full     Dental   lower dentures and upper dentures,     Cardiovascular      Pulmonary      Other Findings        Anesthesia Plan  ASA Score- 3     Anesthesia Type- IV sedation with anesthesia with ASA Monitors  Additional Monitors:   Airway Plan:           Plan Factors-Exercise tolerance (METS): >4 METS  Chart reviewed  Existing labs reviewed  Patient summary reviewed  Induction- intravenous      Postoperative Plan-     Informed Consent- Anesthetic plan and risks discussed with patient  I personally reviewed this patient with the CRNA  Discussed and agreed on the Anesthesia Plan with the CRNA  Mendel Oman

## 2023-04-06 NOTE — PROGRESS NOTES
General Cardiology   Progress Note -  Team One   Les Tolliver 62 y o  female MRN: [id number]  Unit/Bed#: S -01 Encounter: [id number]    Assessment     1  ICD discharge for atypical atrial flutter with 1:1 conduction    Device interrogation reviewed- in atrial flutter/fib since 4/3/2023 at 2242 with rapid rates with what appeared to be one-to-one conduction which caused her ICD shock on 4/4  Home metoprolol succinate 150 mg twice daily has been continued and amlodipine transition to Cardizem 60 mg every 6 hours with well-controlled heart rates with atrial flutter with 4-1 conduction  Undergoing DCCV today    2  Nonischemic myocardial injury in the setting of ICD discharge and tachyarrhythmia  Hs troponin flat and very minimally elevated-114-->130-->114  No anginal symptoms; LHC 2019 showed minimal luminal irregularities without any obstructive coronary disease  Updated echo 4/5/23-LVEF 65% with severe asymmetric hypertrophy of the septal wall and possibly the apical wall suggestive of HCM, mild TR      3   Paroxysmal atrial fibrillation/flutter s/p ablation 2020 and 2022  Telemetry reviewed-atrial flutter with 4-1 AV conduction with heart rates in the 60s  Home Rx continued here-Toprol  mg twice daily with diltiazem 60 mg every 6 hours added for improved rate control  She had previously been on amiodarone however this was discontinued during January admission as it was felt to be contributing to her atrial flutter  Anticoagulated on Xarelto 15mg daily, no missed doses over the past several weeks     4  History of VT s/p MDT dual-chamber ICD     5  Chronic HFpEF  Appears euvolemic on exam  Continued on home regimen of furosemide 40 mg daily  Weight 235 lb, dry weight 234 lb     6  HTN-well controlled over past 24 hours, avg /74      7  HLD-LDL 68   Started on atorvastatin 80 mg     8  CKD 3-Creatinine 1 89, stable  Baseline 1 7-2 2 over past 6 months      9   Obesity-BMI 36 81     10  "Untreated SURINDER     11  Tobacco abuse-smoking 4 cigarettes/day     Plan  1  S/p successful cardioversion this afternoon  2  Stable for d/c later this afternoon with addition of diltiazem 180 mg daily  3  D/c home amlodipine since starting diltiazem  4  Continue rest of home medications including Xarelto for [address]  5  Follow up arranged with EP     Subjective  Review of Systems   Constitutional: Negative for chills, malaise/fatigue and weight gain  Cardiovascular: Negative for chest pain, dyspnea on exertion, leg swelling, orthopnea, palpitations and syncope  Respiratory: Negative for cough, shortness of breath, sleep disturbances due to breathing and sputum production  Gastrointestinal: Negative for bloating, nausea and vomiting  Neurological: Negative for dizziness, light-headedness and weakness  Psychiatric/Behavioral: Negative for altered mental status  All other systems reviewed and are negative  Objective:   Vitals: Blood pressure 129/82, pulse 64, temperature 97 9 °F (36 6 °C), temperature source Temporal, resp  rate 16, height 5' 7\" (1 702 m), weight 107 kg (235 lb), SpO2 95 %, not currently breastfeeding , Body mass index is 36 81 kg/m²  ,     Systolic (82NXH), VG , Min:104 , :987     Diastolic (35HNG), CVH:10, Min:67, Max:82      No intake or output data in the 24 hours ending 23 1143  Wt Readings from Last 3 Encounters:   23 107 kg (235 lb)   02/10/23 107 kg (234 lb 12 8 oz)   23 108 kg (238 lb)     Telemetry Review: Atrial paced after cardioversion    Physical Exam  Vitals reviewed  Constitutional:       General: She is not in acute distress  Appearance: She is obese  Neck:      Vascular: No hepatojugular reflux or JVD  Cardiovascular:      Rate and Rhythm: Normal rate and regular rhythm  Heart sounds: Normal heart sounds  No murmur heard  No friction rub  No gallop  Pulmonary:      Effort: Pulmonary effort is normal  No respiratory distress       " Breath sounds: No rales  Comments: Room air  Abdominal:      General: Bowel sounds are normal  There is no distension  Palpations: Abdomen is soft  Tenderness: There is no abdominal tenderness  Musculoskeletal:         General: No tenderness  Normal range of motion  Cervical back: Neck supple  Right lower leg: No edema  Left lower leg: No edema  Skin:     General: Skin is warm and dry  Capillary Refill: Capillary refill takes less than 2 seconds  Findings: No erythema  Neurological:      Mental Status: She is alert and oriented to person, place, and time     Psychiatric:         Mood and Affect: Mood normal          LABORATORY RESULTS      CBC with diff:   Results from last 7 days   Lab Units 04/05/23 0533 04/05/23  0024 04/04/23 1948   WBC Thousand/uL 4 97 5 50 7 35   HEMOGLOBIN g/dL 11 5 11 5 12 8   HEMATOCRIT % 37 7 37 8 41 7   MCV fL 83 83 82   PLATELETS Thousands/uL 129* 133* 179   MCH pg 25 2* 25 1* 25 0*   MCHC g/dL 30 5* 30 4* 30 7*   RDW % 15 6* 15 7* 15 7*   MPV fL 11 5 11 8 11 2   NRBC AUTO /100 WBCs 0  --  0     CMP:  Results from last 7 days   Lab Units 04/06/23 0518 04/05/23 0533 04/04/23 1948   POTASSIUM mmol/L 4 3 4 1 3 8   CHLORIDE mmol/L 107 107 107   CO2 mmol/L 25 27 23   BUN mg/dL 26* 22 22   CREATININE mg/dL 1 89* 1 68* 1 95*   CALCIUM mg/dL 8 6 9 0 9 9   AST U/L  --  17 18   ALT U/L  --  12 15   ALK PHOS U/L  --  54 65   EGFR ml/min/1 73sq m 28 33 27     BMP:  Results from last 7 days   Lab Units 04/06/23 0518 04/05/23 0533 04/04/23 1948   POTASSIUM mmol/L 4 3 4 1 3 8   CHLORIDE mmol/L 107 107 107   CO2 mmol/L 25 27 23   BUN mg/dL 26* 22 22   CREATININE mg/dL 1 89* 1 68* 1 95*   CALCIUM mg/dL 8 6 9 0 9 9     Lab Results   Component Value Date    CREATININE 1 89 (H) 04/06/2023    CREATININE 1 68 (H) 04/05/2023    CREATININE 1 95 (H) 04/04/2023     Lab Results   Component Value Date    NTBNP 9,053 (H) 01/13/2023    NTBNP 5,423 (H) 01/04/2023 NTBNP 16,909 (H) 2022      Results from last 7 days   Lab Units 23  0518 23  0533   MAGNESIUM mg/dL 2 0 2 5      Results from last 7 days   Lab Units 23   HEMOGLOBIN A1C % 5 4      Results from last 7 days   Lab Units 23   TSH 3RD GENERATON uIU/mL 3 212   FREE T4 ng/dL 1 16     Results from last 7 days   Lab Units 23  0024 23   INR  1 22* 1 22*     Lipid Profile:   No results found for: CHOL  Lab Results   Component Value Date    HDL 40 (L) 2023    HDL 42 (L) 2021    HDL 43 2020     Lab Results   Component Value Date    LDLCALC 68 2023    LDLCALC 57 2021    LDLCALC 72 2020     Lab Results   Component Value Date    TRIG 83 2023    TRIG 80 9 2021    TRIG 89 2020     Cardiac testing:   Results for orders placed during the hospital encounter of 21    Echo complete with contrast if indicated    [place] [address] [address] [address]    Transthoracic Echocardiogram  2D, M-mode, Doppler, and Color Doppler    Study date:  25-May-2021    Patient: Aziza Bliss  MR number: [id number]  Account number: [de-identified]  : 1965  Age: 64 years  Gender: Female  Status: Inpatient  Location: [facility]  Height: 67 in  Weight: 212 lb  BP: 118/ 62 mmHg    Indications: Afib    Diagnoses: I48 0 - Atrial fibrillation    Sonographer:  PRISCILLA Lee  Referring Physician:  Ld Nettles MD  Group:  [facility]  Interpreting Physician:  Chantal Horn MD    SUMMARY    LEFT VENTRICLE:  Systolic function was moderately to markedly reduced  Ejection fraction was estimated to be 30 %  There was moderate diffuse hypokinesis  There was severe concentric hypertrophy  There was significant hypertrophy of the LV apex  RIGHT VENTRICLE:  The size was normal   Systolic function was reduced      LEFT ATRIUM:  The atrium was dilated  HISTORY: PRIOR HISTORY: Cocaine abuse, Smoker, CKD III, Congestive heart failure  Hypertrophic cardiomyopathy  Atrial fibrillation  Risk factors: hypercholesterolemia  PRIOR PROCEDURES: ICD implantation  Arrhythmia ablation  PROCEDURE: The study was performed in the [facility]  This was a routine study  The transthoracic approach was used  The study included complete 2D imaging, M-mode, complete spectral Doppler, and color Doppler  The heart rate was 138  bpm, at the start of the study  Images were obtained from the parasternal, apical, subcostal, and suprasternal notch acoustic windows  Intravenous contrast (  6 mL Definity in NSS) was administered  Echocardiographic views were limited due  to poor acoustic window availability and lung interference  This was a technically difficult study  LEFT VENTRICLE: Size was normal  Systolic function was moderately to markedly reduced  Ejection fraction was estimated to be 30 %  There was moderate diffuse hypokinesis  Wall thickness was markedly increased  There was severe concentric  hypertrophy  There was significant hypertrophy of the LV apex  DOPPLER: Due to tachycardia, there was fusion of early and atrial contributions to ventricular filling  The study was not technically sufficient to allow evaluation of LV  diastolic function  RIGHT VENTRICLE: The size was normal  Systolic function was reduced  Wall thickness was normal  A pacing wire was present  LEFT ATRIUM: The atrium was dilated  RIGHT ATRIUM: Size was normal  A pacing wire was present  MITRAL VALVE: Valve structure was normal  There was normal leaflet separation  DOPPLER: The transmitral velocity was within the normal range  There was no evidence for stenosis  There was trace regurgitation  AORTIC VALVE: The valve was trileaflet  Leaflets exhibited normal thickness and normal cuspal separation  DOPPLER: Transaortic velocity was within the normal range   There was no evidence for stenosis  There was no significant  regurgitation  TRICUSPID VALVE: The valve structure was normal  There was normal leaflet separation  DOPPLER: The transtricuspid velocity was within the normal range  There was no evidence for stenosis  There was trace regurgitation  Pulmonary artery  systolic pressure was within the normal range  Estimated peak PA pressure was 21 mmHg  PULMONIC VALVE: Leaflets exhibited normal thickness, no calcification, and normal cuspal separation  DOPPLER: The transpulmonic velocity was within the normal range  There was trace regurgitation  PERICARDIUM: There was no pericardial effusion  The pericardium was normal in appearance  AORTA: The root exhibited normal size  SYSTEMIC VEINS: IVC: The inferior vena cava was normal in size   Respirophasic changes were normal     SYSTEM MEASUREMENT TABLES    2D  %FS: 27 49 %  Ao Diam: 2 77 cm  EDV(Teich): 57 94 ml  EF(Teich): 54 26 %  ESV(Teich): 26 5 ml  IVSd: 2 46 cm  LA Area: 26 06 cm2  LA Diam: 4 27 cm  LVIDd: 3 7 cm  LVIDs: 2 68 cm  LVPWd: 1 79 cm  RA Area: 18 49 cm2  RVIDd: 3 01 cm  RWT: 0 97  SV(Teich): 31 44 ml    CW  TR Vmax: 2 14 m/s  TR maxP 28 mmHg    MM  TAPSE: 1 51 cm    [facility]    Prepared and electronically signed by    Liz Dunbar MD  Signed [id number] 14:44:53    Results for orders placed during the hospital encounter of 20    HUBER    Narrative  [facility]  [facility], [address] [place] [address]  [phone]    Transesophageal Echocardiogram  2D, Doppler, and Color Doppler    Study date:  20-May-2020    Patient: Jessie Giles  MR number: [id number]  Account number: [de-identified]  : 1965  Age: 54 years  Gender: Female  Status: Outpatient  Location: Cath lab  Height: 67 in  Weight: 221 lb  BP:    Indications: AFIB    Diagnoses: I48 0 - Atrial fibrillation    Sonographer:  PRISCILLA Radford  Interpreting "Physician:  Mariposa Matta MD  Primary Physician:  Gavin Geiger MD  Referring Physician:  Mariposa Matta MD  Group:  [facility]    SUMMARY    LEFT VENTRICLE:  Systolic function was normal  Ejection fraction was estimated to be 60 %  Wall thickness was moderately increased  There was moderate concentric hypertrophy  LEFT ATRIUM:  The atrium was mildly dilated  LEFT ATRIAL APPENDAGE:  The size was normal   The function was normal (normal emptying velocity)  No thrombus was identified  ATRIAL SEPTUM:  No defect or patent foramen ovale was identified  HISTORY: PRIOR HISTORY: AFIB, PPM, MI, HOCM, HTN, HLD, CKD III, Smoker, Cocaine abuse    PROCEDURE: The procedure was performed in the catheterization laboratory  This was a routine study  The risks and alternatives of the procedure were explained to the patient and informed consent was obtained  The transesophageal approach  was used  The study included complete 2D imaging, complete spectral Doppler, and color Doppler  An adult omniplane probe was inserted by the attending cardiologist  Intubated with ease  One intubation attempt(s)  There was no blood  detected on the probe  Image quality was adequate  MEDICATIONS: Anesthesia  LEFT VENTRICLE: Size was normal  Systolic function was normal  Ejection fraction was estimated to be 60 %  Wall thickness was moderately increased  There was moderate concentric hypertrophy  RIGHT VENTRICLE: The size was normal  Systolic function was normal  Wall thickness was normal  A pacing wire was present  LEFT ATRIUM: The atrium was mildly dilated  No mass was present  There was no spontaneous echo contrast (\"smoke\")  APPENDAGE: The size was normal  No thrombus was identified  DOPPLER: The function was normal (normal emptying velocity)  ATRIAL SEPTUM: No defect or patent foramen ovale was identified  RIGHT ATRIUM: Size was normal  No thrombus was identified      MITRAL VALVE: Valve " structure was normal  There was normal leaflet separation  There was no echocardiographic evidence of vegetation  DOPPLER: There was no regurgitation  AORTIC VALVE: The valve was trileaflet  Leaflets exhibited normal thickness and normal cuspal separation  There was no echocardiographic evidence of vegetation  DOPPLER: There was no regurgitation  TRICUSPID VALVE: The valve structure was normal  There was normal leaflet separation  There was no echocardiographic evidence of vegetation  DOPPLER: There was no regurgitation  PERICARDIUM: There was no pericardial effusion  The pericardium was normal in appearance      [address] Accredited Echocardiography Laboratory    Prepared and electronically signed by    Bernardo Jensen MD  Signed [id number] 09:25:57    Meds/Allergies   all current active meds have been reviewed and current meds:   Current Facility-Administered Medications   Medication Dose Route Frequency   • acetaminophen (TYLENOL) tablet 975 mg  975 mg Oral Q8H PRN   • atorvastatin (LIPITOR) tablet 80 mg  80 mg Oral QPM   • diltiazem (CARDIZEM) tablet 60 mg  60 mg Oral Q6H [address]   • doxazosin (CARDURA) tablet 2 mg  2 mg Oral HS   • furosemide (LASIX) tablet 40 mg  40 mg Oral Daily   • HYDROmorphone HCl (DILAUDID) injection 0 2 mg  0 2 mg Intravenous Q3H PRN   • lisinopril (ZESTRIL) tablet 20 mg  20 mg Oral Daily   • metoprolol succinate (TOPROL-XL) 24 hr tablet 150 mg  150 mg Oral BID   • nicotine (NICODERM CQ) 7 mg/24hr TD 24 hr patch 1 patch  1 patch Transdermal Daily   • nitroglycerin (NITROSTAT) SL tablet 0 3 mg  0 3 mg Sublingual Q5 Min PRN   • oxyCODONE (ROXICODONE) IR tablet 5 mg  5 mg Oral Q6H PRN   • oxyCODONE (ROXICODONE) split tablet 2 5 mg  2 5 mg Oral Q6H PRN   • pantoprazole (PROTONIX) EC tablet 40 mg  40 mg Oral Early Morning   • rivaroxaban (XARELTO) tablet 15 mg  15 mg Oral QPM     Medications Prior to Admission   Medication   • acetaminophen (TYLENOL) 500 mg tablet   • amLODIPine (NORVASC) 5 mg tablet   • Diclofenac Sodium (VOLTAREN) 1 %   • doxazosin (CARDURA) 2 mg tablet   • furosemide (LASIX) 40 mg tablet   • lisinopril (ZESTRIL) 20 mg tablet   • metoprolol succinate (TOPROL-XL) 50 mg 24 hr tablet   • oxyCODONE-acetaminophen (PERCOCET) 5-325 mg per tablet   • pantoprazole (PROTONIX) 40 mg tablet   • rivaroxaban (XARELTO) 15 mg tablet     Counseling / Coordination of Care  Total floor / unit time spent today 20 minutes  Greater than 50% of total time was spent with the patient and / or family counseling and / or coordination of care  ** Please Note: Dragon 360 Dictation voice to text software may have been used in the creation of this document   **

## 2023-04-06 NOTE — ASSESSMENT & PLAN NOTE
Wt Readings from Last 3 Encounters:   04/05/23 107 kg (235 lb)   02/10/23 107 kg (234 lb 12 8 oz)   01/19/23 108 kg (238 lb)     Patient appears euvolemic  Patient does not complain of any shortness of breath, dyspnea on exertion or lower leg swelling      Plan:  · Follow-up with cardiology

## 2023-04-06 NOTE — PLAN OF CARE
Problem: PAIN - ADULT  Goal: Verbalizes/displays adequate comfort level or baseline comfort level  Description: Interventions:  - Encourage patient to monitor pain and request assistance  - Assess pain using appropriate pain scale  - Administer analgesics based on type and severity of pain and evaluate response  - Implement non-pharmacological measures as appropriate and evaluate response  - Consider cultural and social influences on pain and pain management  - Notify physician/advanced practitioner if interventions unsuccessful or patient reports new pain  4/6/2023 1735 by Abdoulaye Hopkins RN  Outcome: Adequate for Discharge  4/6/2023 1507 by Abdoulaye Hopkins RN  Outcome: Progressing     Problem: INFECTION - ADULT  Goal: Absence or prevention of progression during hospitalization  Description: INTERVENTIONS:  - Assess and monitor for signs and symptoms of infection  - Monitor lab/diagnostic results  - Monitor all insertion sites, i e  indwelling lines, tubes, and drains  - Monitor endotracheal if appropriate and nasal secretions for changes in amount and color  - [place] appropriate cooling/warming therapies per order  - Administer medications as ordered  - Instruct and encourage patient and family to use good hand hygiene technique  - Identify and instruct in appropriate isolation precautions for identified infection/condition  4/6/2023 1735 by Abdoulaye Hopkins RN  Outcome: Adequate for Discharge  4/6/2023 1507 by Abdoulaye Hopkins RN  Outcome: Progressing  Goal: Absence of fever/infection during neutropenic period  Description: INTERVENTIONS:  - Monitor WBC    4/6/2023 1735 by Abdoulaye Hopkins RN  Outcome: Adequate for Discharge  4/6/2023 1507 by Abdoulaye Hopkins RN  Outcome: Progressing     Problem: SAFETY ADULT  Goal: Patient will remain free of falls  Description: INTERVENTIONS:  - Educate patient/family on patient safety including physical limitations  - Instruct patient to call for assistance with activity   - Consult OT/PT to assist with strengthening/mobility   - Keep Call bell within reach  - Keep bed low and locked with side rails adjusted as appropriate  - Keep care items and personal belongings within reach  - Initiate and maintain comfort rounds  - Make Fall Risk Sign visible to staff  - Offer Toileting every 2 Hours, in advance of need  - Initiate/Maintain alarm  - Obtain necessary fall risk management equipment:   - Apply yellow socks and bracelet for high fall risk patients  - Consider moving patient to room near nurses station  4/6/2023 1735 by Charlie Arambula RN  Outcome: Adequate for Discharge  4/6/2023 1507 by Charlie Arambula RN  Outcome: Progressing  Goal: Maintain or return to baseline ADL function  Description: INTERVENTIONS:  -  Assess patient's ability to carry out ADLs; assess patient's baseline for ADL function and identify physical deficits which impact ability to perform ADLs (bathing, care of mouth/teeth, toileting, grooming, dressing, etc )  - Assess/evaluate cause of self-care deficits   - Assess range of motion  - Assess patient's mobility; develop plan if impaired  - Assess patient's need for assistive devices and provide as appropriate  - Encourage maximum independence but intervene and supervise when necessary  - Involve family in performance of ADLs  - Assess for home care needs following discharge   - Consider OT consult to assist with ADL evaluation and planning for discharge  - Provide patient education as appropriate  4/6/2023 1735 by Charlie Arambula RN  Outcome: Adequate for Discharge  4/6/2023 1507 by Charlie Arambula RN  Outcome: Progressing  Goal: Maintains/Returns to pre admission functional level  Description: INTERVENTIONS:  - Perform BMAT or MOVE assessment daily    - Set and communicate daily mobility goal to care team and patient/family/caregiver     - Collaborate with rehabilitation services on mobility goals if consulted  - Perform Range of Motion   - Reposition patient   - Dangle patient   - Stand patient  - Ambulate patient   - Out of bed to chair  - Out of bed for meals   - Out of bed for toileting  - Record patient progress and toleration of activity level   4/6/2023 1735 by Yue Deluca RN  Outcome: Adequate for Discharge  4/6/2023 1507 by Yue Deluca RN  Outcome: Progressing     Problem: DISCHARGE PLANNING  Goal: Discharge to home or other facility with appropriate resources  Description: INTERVENTIONS:  - Identify barriers to discharge w/patient and caregiver  - Arrange for needed discharge resources and transportation as appropriate  - Identify discharge learning needs (meds, wound care, etc )  - Arrange for interpretive services to assist at discharge as needed  - Refer to Case Management Department for coordinating discharge planning if the patient needs post-hospital services based on physician/advanced practitioner order or complex needs related to functional status, cognitive ability, or social support system  4/6/2023 1735 by Yue Deluca RN  Outcome: Adequate for Discharge  4/6/2023 1507 by Yue Deluca RN  Outcome: Progressing     Problem: Knowledge Deficit  Goal: Patient/family/caregiver demonstrates understanding of disease process, treatment plan, medications, and discharge instructions  Description: Complete learning assessment and assess knowledge base    Interventions:  - Provide teaching at level of understanding  - Provide teaching via preferred learning methods  4/6/2023 1735 by Yue Deluca RN  Outcome: Adequate for Discharge  4/6/2023 1507 by Yue Deluca RN  Outcome: Progressing     Problem: CARDIOVASCULAR - ADULT  Goal: Maintains optimal cardiac output and hemodynamic stability  Description: INTERVENTIONS:  - Monitor I/O, vital signs and rhythm  - Monitor for S/S and trends of decreased cardiac output  - Administer and titrate ordered vasoactive medications to optimize hemodynamic stability  - Assess quality of pulses, skin color and temperature  - Assess for signs of decreased coronary artery perfusion  - Instruct patient to report change in severity of symptoms  4/6/2023 1735 by Jamari Stevens RN  Outcome: Adequate for Discharge  4/6/2023 1507 by Jamari Stevens RN  Outcome: Progressing  Goal: Absence of cardiac dysrhythmias or at baseline rhythm  Description: INTERVENTIONS:  - Continuous cardiac monitoring, vital signs, obtain 12 lead EKG if ordered  - Administer antiarrhythmic and heart rate control medications as ordered  - Monitor electrolytes and administer replacement therapy as ordered  4/6/2023 1735 by Jamari Stevens RN  Outcome: Adequate for Discharge  4/6/2023 1507 by Jamari Stevens RN  Outcome: Progressing

## 2023-04-06 NOTE — DISCHARGE SUMMARY
[facility]  Discharge- Ashley Proper 1965, 62 y o  female MRN: [id number]  Unit/Bed#: S -01 Encounter: [id number]  Primary Care Provider: Angelito Santana MD   Date and time admitted to hospital: 4/4/2023  7:41 PM    * ICD (implantable cardioverter-defibrillator) discharge with atypical atrial flutter 1:1 conduction  Assessment & Plan  Patient felt her ICD shock her during stressful family situation/argument  [employer]  · Interrogation showed A-fib with RVR  Plan:  · Continue home medications  · Started diltiazem 180 mg daily  · Stop amlodipine 5 mg daily  · Follow-up with cardiology outpatient    Paroxysmal A-fib [facility])  Assessment & Plan  Patient anticoagulated on Xarelto 15 mg nightly  Patient arrived tachycardic  Continues to be 120s  Received 1 L bolus in the ED without any changes to heart rate  Patient appears to be euvolemic    Plan:  · Continue home medications  · Follow-up with cardiology    Chest pain-resolved as of 4/6/2023  Assessment & Plan  Lab Results   Component Value Date    HSTNI0 114 (H) 04/04/2023    HSTNI2 130 (H) 04/04/2023    HSTNID2 16 04/04/2023    HSTNI4 114 (H) 04/05/2023    HSTNID4 0 04/05/2023     Recent stress test/Echo:   12/21/22 ECHO: LVEF 65%  SF NL  GII diastolic dysfunction  Pacer wire present  No recent stress test      • CXR: Pending official results, nothing acutely appreciated  • EKG: POA - Sinus tachy 132 with RBBB and left anterior fascicular block  • Likely non ST elevation MI vs stable angina  • BRITTA Score: 3  • Heart Score: 8    ECHO 4/5/23:   Left Ventricle: Left ventricular cavity size is normal  Wall thickness is increased  The endocardium is not well visualized and Definity was not used  There is mild concentric hypertrophy  There is severe asymmetric hypertrophy of the septal wall and possibly the apical walls  This may suggest HCM  The left ventricular ejection fraction is 65%   Systolic function is normal  Although no diagnostic regional wall motion abnormality was identified, this possibility cannot be completely excluded on the basis of this study  Tricuspid Valve: There is mild regurgitation  Lipid panel: Chol 125, Trig 83, HDL 40, LDL 68  HbA1C: 5 4  TSH: 3 212  T4: 1 16    Plan:   · Resolved      Elevated troponin-resolved as of 4/6/2023  Assessment & Plan  See plan for chest pain    Combined systolic and diastolic heart failure (HCC)  Assessment & Plan  Wt Readings from Last 3 Encounters:   04/05/23 107 kg (235 lb)   02/10/23 107 kg (234 lb 12 8 oz)   01/19/23 108 kg (238 lb)     Patient appears euvolemic  Patient does not complain of any shortness of breath, dyspnea on exertion or lower leg swelling      Plan:  · Follow-up with cardiology          Benign hypertension with CKD (chronic kidney disease) stage III [facility])  Assessment & Plan  Lab Results   Component Value Date    EGFR 28 04/06/2023    EGFR 33 04/05/2023    EGFR 27 04/04/2023    CREATININE 1 89 (H) 04/06/2023    CREATININE 1 68 (H) 04/05/2023    CREATININE 1 95 (H) 04/04/2023     Baseline creatinine 1 7 to 2 1 since mid 2021, 1 5 to 1 7 since early 2019, 1 3 in 2018, 1 1 going back to 2016    Plan:  · Follow-up with PCP    Hypertension  Assessment & Plan  Continue home Norvasc, Cardura, Toprol XL and lisinopril  Patient's BP on admission improved after nighttime home meds    Plan:  · Continue all medications except Norvasc    Tobacco abuse  Assessment & Plan  Patient continues to smoke 1 pack/day  Encourage smoking cessation    Plan:  · Encourage smoking cessation      Medical Problems     Resolved Problems  Date Reviewed: 4/6/2023          Resolved    Chest pain 4/6/2023     Resolved by  Eveline Palma DO    Elevated troponin 4/6/2023     Resolved by  Eveline Palma DO        Discharging Resident: Eveline Palma DO  Discharging Attending: Erik Francis MD  PCP: Otis Santana MD  Admission Date:   Admission Orders (From admission, onward)     Ordered 04/04/23 2111  INPATIENT ADMISSION  Once                      Discharge Date: 04/06/23    Consultations During Hospital Stay:  · Cardiology    Procedures Performed:   · Cardioversion    Significant Findings / Test Results:   XR chest 1 view portable   ED Interpretation by Leonard Kearns MD (04/05 [id number])   Trachea midline  Cardiac silhouette appears consistent in caliber and size compared to previous radiographs  Bilateral costophrenic angles can be appreciated although it does appear hazy in bilateral lung fields  No focal consolidation, or opacity noted  No cristóbal osseous pathology noted on examination  Implanted cardiac device can be appreciated with wire placement  ECG leads also noted  Grossly unremarkable chest x-ray given patient's history of CHF and intrathoracic pathology in the past   Read by Crystal Epstein  Final Result by Chris Newman MD (04/05 4808)      No acute cardiopulmonary disease  Workstation performed: [id number]           · ECHO - 65% EF, severe asymmetric hypertrophy of the septal wall and possibly the apical walls    Incidental Findings:   · None    Test Results Pending at Discharge (will require follow up): · None     Outpatient Tests Requested:  · None    Complications:  None    Reason for Admission: ICD discharge    Hospital Course:   Vangie Nichols is a 62 y o  female patient who originally presented to the hospital on 4/4/2023 due to her ICD discharging  Patient has a past medical history consistent of CHF status post ICD placement, A-fib on Xarelto, and CKD  Patient presented because she had a syncopal episode after which she felt a shock from her ICD  According to the patient, she felt as if somebody had punched her  Upon arriving to the ED with EMS, patient continued to have substernal chest pain and pressure   There was one elevated troponin, it was decided that patient would be admitted for further work-up of her chest pain     During this admission, cardiology was consulted  On interrogation of her device, she appeared to be in atrial tachycardia/atrial flutter  As a result of the tachycardia, cardiology decided to cardiovert the patient  After cardioversion, cardiology started the patient on Cardizem 180 mg and stopped amlodipine 5 mg daily  On day of discharge, patient converted to normal sinus rhythm  Patient should follow-up with cardiology upon discharge  The following medications were added/adjusted to patient's regimen:  · Start diltiazem 180 mg daily  · Stop amlodipine 5 mg daily    Patient should follow-up with PCP and cardiology upon discharge  Please see above list of diagnoses and related plan for additional information  Condition at Discharge: stable    Discharge Day Visit / Exam:   *Please see progress note from today  *    Discharge instructions/Information to patient and family:   See after visit summary for information provided to patient and family  Provisions for Follow-Up Care:  See after visit summary for information related to follow-up care and any pertinent home health orders  Disposition:   Home    Planned Readmission: None    Discharge Medications:  See after visit summary for reconciled discharge medications provided to patient and/or family        **Please Note: This note may have been constructed using a voice recognition system**

## 2023-04-06 NOTE — ASSESSMENT & PLAN NOTE
Lab Results   Component Value Date    HSTNI0 114 (H) 04/04/2023    HSTNI2 130 (H) 04/04/2023    HSTNID2 16 04/04/2023    HSTNI4 114 (H) 04/05/2023    HSTNID4 0 04/05/2023     Recent stress test/Echo:   12/21/22 ECHO: LVEF 65%  SF NL  GII diastolic dysfunction  Pacer wire present  No recent stress test      • CXR: Pending official results, nothing acutely appreciated  • EKG: POA - Sinus tachy 132 with RBBB and left anterior fascicular block  • Likely non ST elevation MI vs stable angina  • BRITTA Score: 3  • Heart Score: 8    ECHO 4/5/23:   Left Ventricle: Left ventricular cavity size is normal  Wall thickness is increased  The endocardium is not well visualized and Definity was not used  There is mild concentric hypertrophy  There is severe asymmetric hypertrophy of the septal wall and possibly the apical walls  This may suggest HCM  The left ventricular ejection fraction is 65%  Systolic function is normal  Although no diagnostic regional wall motion abnormality was identified, this possibility cannot be completely excluded on the basis of this study  Tricuspid Valve: There is mild regurgitation      Lipid panel: Chol 125, Trig 83, HDL 40, LDL 68  HbA1C: 5 4  TSH: 3 212  T4: 1 16    Plan:   · Resolved

## 2023-04-06 NOTE — PROGRESS NOTES
[facility]  Progress Note  Name: Anne Schaumann  MRN: [id number]  Unit/Bed#: S -01 I Date of Admission: 4/4/2023   Date of Service: 4/6/2023 I Hospital Day: 2    Assessment/Plan   * Chest pain  Assessment & Plan  Lab Results   Component Value Date    HSTNI0 114 (H) 04/04/2023    HSTNI2 130 (H) 04/04/2023    HSTNID2 16 04/04/2023    HSTNI4 114 (H) 04/05/2023    HSTNID4 0 04/05/2023     Recent stress test/Echo:   12/21/22 ECHO: LVEF 65%  SF NL  GII diastolic dysfunction  Pacer wire present  No recent stress test      • CXR: Pending official results, nothing acutely appreciated  • EKG: POA - Sinus tachy 132 with RBBB and left anterior fascicular block  • Likely non ST elevation MI vs stable angina  • BRITTA Score: 3  • Heart Score: 8    ECHO 4/5/23:   Left Ventricle: Left ventricular cavity size is normal  Wall thickness is increased  The endocardium is not well visualized and Definity was not used  There is mild concentric hypertrophy  There is severe asymmetric hypertrophy of the septal wall and possibly the apical walls  This may suggest HCM  The left ventricular ejection fraction is 65%  Systolic function is normal  Although no diagnostic regional wall motion abnormality was identified, this possibility cannot be completely excluded on the basis of this study  Tricuspid Valve: There is mild regurgitation  Lipid panel: Chol 125, Trig 83, HDL 40, LDL 68  HbA1C: 5 4  TSH: 3 212  T4: 1 16    Plan:   • STAT EKG and troponin if chest pain, Telemetry for arrhythmias  • Nitroglycerin p r n , loaded with  mg en route to ED  • Xarelto restarted  • Started high dose statin  • Resumed home metoprolol  • Consulted cardiology - Recommendations appreciated  o Cardioversion today    Paroxysmal A-fib [facility])  Assessment & Plan  Patient anticoagulated on Xarelto 15 mg nightly  Patient arrived tachycardic  Continues to be 120s    Received 1 L bolus in the ED without any changes to heart rate   Patient appears to be euvolemic    Plan:  · Continue home Xarelto  · D/c Heparin drip  · Continue home metoprolol 150 mg twice daily  · Telemetry    Elevated troponin  Assessment & Plan  See plan for chest pain    ICD (implantable cardioverter-defibrillator) discharge  Assessment & Plan  Patient felt her ICD shock her during stressful family situation/argument  [employer]  · Interrogation showed A-fib with RVR  Plan:  · Restart Xarelto  · D/c Heparin drip    Combined systolic and diastolic heart failure (HCC)  Assessment & Plan  Wt Readings from Last 3 Encounters:   04/05/23 107 kg (235 lb)   02/10/23 107 kg (234 lb 12 8 oz)   01/19/23 108 kg (238 lb)     Patient appears euvolemic  Patient does not complain of any shortness of breath, dyspnea on exertion or lower leg swelling  Plan:  · See principal problem  · Continue Lasix          Benign hypertension with CKD (chronic kidney disease) stage III [facility])  Assessment & Plan  Lab Results   Component Value Date    EGFR 28 04/06/2023    EGFR 33 04/05/2023    EGFR 27 04/04/2023    CREATININE 1 89 (H) 04/06/2023    CREATININE 1 68 (H) 04/05/2023    CREATININE 1 95 (H) 04/04/2023     Baseline creatinine 1 7 to 2 1 since mid 2021, 1 5 to 1 7 since early 2019, 1 3 in 2018, 1 1 going back to 2016    Plan:  · POA Cr 1 95 appears to be within his baseline    · Worsening CKD between stages III and IV  · Avoid nephrotoxic agents  · Patient reports allergies hives to iodine contrast  · Resumed home Lasix    Hypertension  Assessment & Plan  Continue home Norvasc, Cardura, Toprol XL and lisinopril  Patient's BP on admission improved after nighttime home meds    Plan:  · Continue all medications except Norvasc    Tobacco abuse  Assessment & Plan  Patient continues to smoke 1 pack/day  Encourage smoking cessation    Plan:  · Nicotine patch      VTE Pharmacologic Prophylaxis: VTE Score: 3 Moderate Risk (Score 3-4) - Pharmacological DVT Prophylaxis Ordered: rivaroxaban (Xarelto)  Patient Centered Rounds: I performed bedside rounds with nursing staff today  Discussions with Specialists or Other Care Team Provider: None    Education and Discussions with Family / Patient: Patient declined call to   Current Length of Stay: 2 day(s)  Current Patient Status: Inpatient   Discharge Plan: Anticipate discharge tomorrow to home  Code Status: Level 1 - Full Code    Subjective:   Patient is a 55-year-old female who presented as a result of her ICD shocking her status post having a syncopal episode  Today, patient states that she is feeling much better  She states that her heart does race fast at times  There were no acute events overnight  Objective:     Vitals:   Temp (24hrs), Av 1 °F (36 7 °C), Min:97 8 °F (36 6 °C), Max:98 4 °F (36 9 °C)    Temp:  [97 8 °F (36 6 °C)-98 4 °F (36 9 °C)] 97 9 °F (36 6 °C)  HR:  [64-95] 64  Resp:  [16-20] 16  BP: (104-129)/(67-82) 129/82  SpO2:  [95 %-96 %] 95 %  Body mass index is 36 81 kg/m²  Input and Output Summary (last 24 hours):   No intake or output data in the 24 hours ending 23 1128    Physical Exam:   Physical Exam  Vitals and nursing note reviewed  Constitutional:       General: She is not in acute distress  Appearance: Normal appearance  HENT:      Head: Normocephalic and atraumatic  Mouth/Throat:      Mouth: Mucous membranes are moist    Eyes:      Extraocular Movements: Extraocular movements intact  Conjunctiva/sclera: Conjunctivae normal       Pupils: Pupils are equal, round, and reactive to light  Cardiovascular:      Rate and Rhythm: Normal rate and regular rhythm  Pulses: Normal pulses  Heart sounds: Normal heart sounds  Pulmonary:      Effort: Pulmonary effort is normal  No respiratory distress  Breath sounds: Normal breath sounds  No wheezing  Abdominal:      General: Abdomen is flat  Bowel sounds are normal  There is no distension        Palpations: Abdomen is soft  There is no mass  Tenderness: There is no abdominal tenderness  There is no guarding  Musculoskeletal:      Right lower leg: No edema  Left lower leg: No edema  Skin:     General: Skin is warm and dry  Neurological:      Mental Status: She is alert and oriented to person, place, and time     Psychiatric:         Mood and Affect: Mood normal          Behavior: Behavior normal        Additional Data:     Labs:  Results from last 7 days   Lab Units 04/05/23  0533   WBC Thousand/uL 4 97   HEMOGLOBIN g/dL 11 5   HEMATOCRIT % 37 7   PLATELETS Thousands/uL 129*   NEUTROS PCT % 61   LYMPHS PCT % 31   MONOS PCT % 7   EOS PCT % 1     Results from last 7 days   Lab Units 04/06/23  0518 04/05/23  0533   SODIUM mmol/L 133* 139   POTASSIUM mmol/L 4 3 4 1   CHLORIDE mmol/L 107 107   CO2 mmol/L 25 27   BUN mg/dL 26* 22   CREATININE mg/dL 1 89* 1 68*   ANION GAP mmol/L 1* 5   CALCIUM mg/dL 8 6 9 0   ALBUMIN g/dL  --  3 6   TOTAL BILIRUBIN mg/dL  --  0 50   ALK PHOS U/L  --  54   ALT U/L  --  12   AST U/L  --  17   GLUCOSE RANDOM mg/dL 97 89     Results from last 7 days   Lab Units 04/05/23  0024   INR  1 22*         Results from last 7 days   Lab Units 04/04/23  1948   HEMOGLOBIN A1C % 5 4           Lines/Drains:  Invasive Devices     Peripheral Intravenous Line  Duration           Peripheral IV 04/06/23 Left Forearm <1 day                  Telemetry:  Telemetry Orders (From admission, onward)             48 Hour Telemetry Monitoring  Continuous x 48 hours        Expiring   References:    Telemetry Guidelines   Question:  Reason for 48 Hour Telemetry  Answer:  Acute MI, chest pain - R/O MI, or unstable angina                 Telemetry Reviewed: Normal Sinus Rhythm  Indication for Continued Telemetry Use: Arrthymias requiring medical therapy             Imaging: Reviewed radiology reports from this admission including: chest xray     Recent Cultures (last 7 days):         Last 24 Hours Medication List:   Current Facility-Administered Medications   Medication Dose Route Frequency Provider Last Rate   • acetaminophen  975 mg Oral Q8H PRN Ezio Munson MD     • atorvastatin  80 mg Oral QPM Ezio Munson MD     • diltiazem  60 mg Oral Q6H [address] [place]     • doxazosin  2 mg Oral HS Ezio Munson MD     • furosemide  40 mg Oral Daily Ezio Munson MD     • HYDROmorphone  0 2 mg Intravenous Q3H PRN Ezio Munson MD     • lisinopril  20 mg Oral Daily Ezio Munson MD     • metoprolol succinate  150 mg Oral BID Ezio Munson MD     • nicotine  1 patch Transdermal Daily Ezio Munson MD     • nitroglycerin  0 3 mg Sublingual Q5 Min PRN Ezio Munson MD     • oxyCODONE  5 mg Oral Q6H PRN Ezio Munson MD     • oxyCODONE  2 5 mg Oral Q6H PRN Ezio Munson MD     • pantoprazole  40 mg Oral Early Morning Ezio Munson MD     • rivaroxaban  15 mg Oral QPM Sal Stoner DO          Today, Patient Was Seen By: Sal Stoner DO    **Please Note: This note may have been constructed using a voice recognition system  **

## 2023-04-06 NOTE — ASSESSMENT & PLAN NOTE
Patient anticoagulated on Xarelto 15 mg nightly  Patient arrived tachycardic  Continues to be 120s  Received 1 L bolus in the ED without any changes to heart rate    Patient appears to be euvolemic    Plan:  · Continue home medications  · Follow-up with cardiology

## 2023-04-06 NOTE — PLAN OF CARE
Problem: PAIN - ADULT  Goal: Verbalizes/displays adequate comfort level or baseline comfort level  Description: Interventions:  - Encourage patient to monitor pain and request assistance  - Assess pain using appropriate pain scale  - Administer analgesics based on type and severity of pain and evaluate response  - Implement non-pharmacological measures as appropriate and evaluate response  - Consider cultural and social influences on pain and pain management  - Notify physician/advanced practitioner if interventions unsuccessful or patient reports new pain  Outcome: Progressing     Problem: INFECTION - ADULT  Goal: Absence or prevention of progression during hospitalization  Description: INTERVENTIONS:  - Assess and monitor for signs and symptoms of infection  - Monitor lab/diagnostic results  - Monitor all insertion sites, i e  indwelling lines, tubes, and drains  - Monitor endotracheal if appropriate and nasal secretions for changes in amount and color  - [place] appropriate cooling/warming therapies per order  - Administer medications as ordered  - Instruct and encourage patient and family to use good hand hygiene technique  - Identify and instruct in appropriate isolation precautions for identified infection/condition  Outcome: Progressing  Goal: Absence of fever/infection during neutropenic period  Description: INTERVENTIONS:  - Monitor WBC    Outcome: Progressing     Problem: SAFETY ADULT  Goal: Patient will remain free of falls  Description: INTERVENTIONS:  - Educate patient/family on patient safety including physical limitations  - Instruct patient to call for assistance with activity   - Consult OT/PT to assist with strengthening/mobility   - Keep Call bell within reach  - Keep bed low and locked with side rails adjusted as appropriate  - Keep care items and personal belongings within reach  - Initiate and maintain comfort rounds  - Make Fall Risk Sign visible to staff  - Offer Toileting every 2 Hours, in advance of need  - Initiate/Maintain alarm  - Obtain necessary fall risk management equipment:   - Apply yellow socks and bracelet for high fall risk patients  - Consider moving patient to room near nurses station  Outcome: Progressing  Goal: Maintain or return to baseline ADL function  Description: INTERVENTIONS:  -  Assess patient's ability to carry out ADLs; assess patient's baseline for ADL function and identify physical deficits which impact ability to perform ADLs (bathing, care of mouth/teeth, toileting, grooming, dressing, etc )  - Assess/evaluate cause of self-care deficits   - Assess range of motion  - Assess patient's mobility; develop plan if impaired  - Assess patient's need for assistive devices and provide as appropriate  - Encourage maximum independence but intervene and supervise when necessary  - Involve family in performance of ADLs  - Assess for home care needs following discharge   - Consider OT consult to assist with ADL evaluation and planning for discharge  - Provide patient education as appropriate  Outcome: Progressing  Goal: Maintains/Returns to pre admission functional level  Description: INTERVENTIONS:  - Perform BMAT or MOVE assessment daily    - Set and communicate daily mobility goal to care team and patient/family/caregiver     - Collaborate with rehabilitation services on mobility goals if consulted  - Perform Range of Motion  - Reposition patient   - Dangle patient   - Stand patient   - Ambulate patient   - Out of bed to chair   - Out of bed for meals   - Out of bed for toileting  - Record patient progress and toleration of activity level   Outcome: Progressing     Problem: DISCHARGE PLANNING  Goal: Discharge to home or other facility with appropriate resources  Description: INTERVENTIONS:  - Identify barriers to discharge w/patient and caregiver  - Arrange for needed discharge resources and transportation as appropriate  - Identify discharge learning needs (meds, wound care, etc )  - Arrange for interpretive services to assist at discharge as needed  - Refer to Case Management Department for coordinating discharge planning if the patient needs post-hospital services based on physician/advanced practitioner order or complex needs related to functional status, cognitive ability, or social support system  Outcome: Progressing     Problem: Knowledge Deficit  Goal: Patient/family/caregiver demonstrates understanding of disease process, treatment plan, medications, and discharge instructions  Description: Complete learning assessment and assess knowledge base    Interventions:  - Provide teaching at level of understanding  - Provide teaching via preferred learning methods  Outcome: Progressing     Problem: CARDIOVASCULAR - ADULT  Goal: Maintains optimal cardiac output and hemodynamic stability  Description: INTERVENTIONS:  - Monitor I/O, vital signs and rhythm  - Monitor for S/S and trends of decreased cardiac output  - Administer and titrate ordered vasoactive medications to optimize hemodynamic stability  - Assess quality of pulses, skin color and temperature  - Assess for signs of decreased coronary artery perfusion  - Instruct patient to report change in severity of symptoms  Outcome: Progressing  Goal: Absence of cardiac dysrhythmias or at baseline rhythm  Description: INTERVENTIONS:  - Continuous cardiac monitoring, vital signs, obtain 12 lead EKG if ordered  - Administer antiarrhythmic and heart rate control medications as ordered  - Monitor electrolytes and administer replacement therapy as ordered  Outcome: Progressing

## 2023-04-06 NOTE — ASSESSMENT & PLAN NOTE
Patient felt her ICD shock her during stressful family situation/argument  [employer]  · Interrogation showed A-fib with RVR      Plan:  · Continue home medications  · Started diltiazem 180 mg daily  · Stop amlodipine 5 mg daily  · Follow-up with cardiology outpatient

## 2023-04-06 NOTE — ANESTHESIA POSTPROCEDURE EVALUATION
Post-Op Assessment Note    CV Status:  Stable  Pain Score: 0    Pain management: adequate     Mental Status:  Alert and sleepy   Hydration Status:  Euvolemic   PONV Controlled:  Controlled   Airway Patency:  Patent      Post Op Vitals Reviewed: Yes      Staff: CRNA         No notable events documented      BP   113/66   Temp      Pulse  62   Resp   18   SpO2   94

## 2023-04-06 NOTE — ASSESSMENT & PLAN NOTE
Lab Results   Component Value Date    EGFR 28 04/06/2023    EGFR 33 04/05/2023    EGFR 27 04/04/2023    CREATININE 1 89 (H) 04/06/2023    CREATININE 1 68 (H) 04/05/2023    CREATININE 1 95 (H) 04/04/2023     Baseline creatinine 1 7 to 2 1 since mid 2021, 1 5 to 1 7 since early 2019, 1 3 in 2018, 1 1 going back to 2016    Plan:  · Follow-up with PCP

## 2023-04-07 LAB
ATRIAL RATE: 60 BPM
ATRIAL RATE: 60 BPM
ATRIAL RATE: 67 BPM
P AXIS: 59 DEGREES
P AXIS: 76 DEGREES
P AXIS: 84 DEGREES
PR INTERVAL: 0 MS
PR INTERVAL: 192 MS
PR INTERVAL: 196 MS
QRS AXIS: -57 DEGREES
QRS AXIS: -58 DEGREES
QRS AXIS: -68 DEGREES
QRSD INTERVAL: 160 MS
QRSD INTERVAL: 162 MS
QRSD INTERVAL: 186 MS
QT INTERVAL: 466 MS
QT INTERVAL: 494 MS
QT INTERVAL: 518 MS
QTC INTERVAL: 481 MS
QTC INTERVAL: 494 MS
QTC INTERVAL: 518 MS
T WAVE AXIS: 102 DEGREES
T WAVE AXIS: 116 DEGREES
T WAVE AXIS: 84 DEGREES
VENTRICULAR RATE: 60 BPM
VENTRICULAR RATE: 60 BPM
VENTRICULAR RATE: 64 BPM

## 2023-04-07 NOTE — UTILIZATION REVIEW
NOTIFICATION OF ADMISSION DISCHARGE   This is a Notification of Discharge from [address]  Please be advised that this patient has been discharge from our facility  Below you will find the admission and discharge date and time including the patient’s disposition  UTILIZATION REVIEW CONTACT:  Tony Marino MA  Utilization   Network Utilization Review Department  Phone: [phone] x carefully listen to the prompts  All voicemails are confidential   Email: [email or web address]     ADMISSION INFORMATION  PRESENTATION DATE: 4/4/2023  7:41 PM  OBERVATION ADMISSION DATE:   INPATIENT ADMISSION DATE: 4/4/23  9:11 PM   DISCHARGE DATE: 4/6/2023  6:18 PM   DISPOSITION:Home/Self Care    IMPORTANT INFORMATION:  Send all requests for admission clinical reviews, approved or denied determinations and any other requests to dedicated fax number below belonging to the campus where the patient is receiving treatment   List of dedicated fax numbers:  [address] DENIALS (Administrative/Medical Necessity) [phone]   [address] (Maternity/NICU/Pediatrics) [phone]   [facility] [phone]   [place] [address] [phone]   [address] [phone]   220 [facility] [phone]   90 [facility] [phone]   [address] [place] [place] [address] 119 [phone]   [facility]  [phone] 7474 [facility] [phone] 412 [facility] [address] [facility] [phone]

## 2023-04-14 NOTE — PROGRESS NOTES
Results for orders placed or performed in visit on 07/15/19   Cardiac EP device report    Narrative    MDT DUAL ICD  CARELINK TRANSMISSION: BATTERY VOLTAGE ADEQUATE (7 6 YRS)  AP - 45 2%  - 0 4%  ALL AVAILABLE LEAD PARAMETERS WITHIN NORMAL LIMITS  1 AF EPISODE W/DURATION OF 14:15:04 HRS  [place] - 3 6%  HX: PAF & PT TAKES XARELTO, METOPROLOL SUCC  NO OTHER NEW SIGNIFICANT HIGH RATE EPISODES  OPTI-VOL FLUID THRESHOLD REMAINS CROSSED & ONGOING  TASK TO CHF RN  WILL BRITTANY IN 31 DAYS  NORMAL DEVICE FUNCTION      EB
appears normal and intact

## 2023-05-06 ENCOUNTER — HOSPITAL ENCOUNTER (EMERGENCY)
Facility: HOSPITAL | Age: 58
Discharge: HOME | End: 2023-05-06
Attending: EMERGENCY MEDICINE

## 2023-05-06 ENCOUNTER — APPOINTMENT (EMERGENCY)
Dept: CT IMAGING | Facility: HOSPITAL | Age: 58
End: 2023-05-06

## 2023-05-06 VITALS
OXYGEN SATURATION: 97 % | RESPIRATION RATE: 18 BRPM | WEIGHT: 222 LBS | DIASTOLIC BLOOD PRESSURE: 95 MMHG | HEIGHT: 67 IN | BODY MASS INDEX: 34.84 KG/M2 | TEMPERATURE: 98.7 F | SYSTOLIC BLOOD PRESSURE: 137 MMHG | HEART RATE: 75 BPM

## 2023-05-06 DIAGNOSIS — H65.92 LEFT NON-SUPPURATIVE OTITIS MEDIA: Primary | ICD-10-CM

## 2023-05-06 LAB
2HR DELTA HS TROPONIN: 5 NG/L
4HR DELTA HS TROPONIN: 2 NG/L
ALBUMIN SERPL BCP-MCNC: 3.9 G/DL (ref 3.5–5)
ALP SERPL-CCNC: 59 U/L (ref 34–104)
ALT SERPL W P-5'-P-CCNC: 15 U/L (ref 7–52)
ANION GAP SERPL CALCULATED.3IONS-SCNC: 8 MMOL/L (ref 4–13)
AST SERPL W P-5'-P-CCNC: 15 U/L (ref 13–39)
BASOPHILS # BLD AUTO: 0.02 THOUSANDS/ÂΜL (ref 0–0.1)
BASOPHILS NFR BLD AUTO: 0 % (ref 0–1)
BILIRUB SERPL-MCNC: 0.51 MG/DL (ref 0.2–1)
BUN SERPL-MCNC: 22 MG/DL (ref 5–25)
CALCIUM SERPL-MCNC: 9 MG/DL (ref 8.4–10.2)
CARDIAC TROPONIN I PNL SERPL HS: 43 NG/L
CARDIAC TROPONIN I PNL SERPL HS: 45 NG/L
CARDIAC TROPONIN I PNL SERPL HS: 48 NG/L
CHLORIDE SERPL-SCNC: 107 MMOL/L (ref 96–108)
CO2 SERPL-SCNC: 24 MMOL/L (ref 21–32)
CREAT SERPL-MCNC: 1.77 MG/DL (ref 0.6–1.3)
EOSINOPHIL # BLD AUTO: 0.03 THOUSAND/ÂΜL (ref 0–0.61)
EOSINOPHIL NFR BLD AUTO: 1 % (ref 0–6)
ERYTHROCYTE [DISTWIDTH] IN BLOOD BY AUTOMATED COUNT: 15.5 % (ref 11.6–15.1)
GFR SERPL CREATININE-BSD FRML MDRD: 31 ML/MIN/1.73SQ M
GLUCOSE SERPL-MCNC: 105 MG/DL (ref 65–140)
HCT VFR BLD AUTO: 33 % (ref 34.8–46.1)
HGB BLD-MCNC: 10.3 G/DL (ref 11.5–15.4)
IMM GRANULOCYTES # BLD AUTO: 0.03 THOUSAND/UL (ref 0–0.2)
IMM GRANULOCYTES NFR BLD AUTO: 1 % (ref 0–2)
LYMPHOCYTES # BLD AUTO: 0.88 THOUSANDS/ÂΜL (ref 0.6–4.47)
LYMPHOCYTES NFR BLD AUTO: 15 % (ref 14–44)
MCH RBC QN AUTO: 25.7 PG (ref 26.8–34.3)
MCHC RBC AUTO-ENTMCNC: 31.2 G/DL (ref 31.4–37.4)
MCV RBC AUTO: 82 FL (ref 82–98)
MONOCYTES # BLD AUTO: 0.37 THOUSAND/ÂΜL (ref 0.17–1.22)
MONOCYTES NFR BLD AUTO: 6 % (ref 4–12)
NEUTROPHILS # BLD AUTO: 4.52 THOUSANDS/ÂΜL (ref 1.85–7.62)
NEUTS SEG NFR BLD AUTO: 77 % (ref 43–75)
NRBC BLD AUTO-RTO: 0 /100 WBCS
PLATELET # BLD AUTO: 149 THOUSANDS/UL (ref 149–390)
PMV BLD AUTO: 11.7 FL (ref 8.9–12.7)
POTASSIUM SERPL-SCNC: 4 MMOL/L (ref 3.5–5.3)
PROT SERPL-MCNC: 7.2 G/DL (ref 6.4–8.4)
RBC # BLD AUTO: 4.01 MILLION/UL (ref 3.81–5.12)
S PYO DNA THROAT QL NAA+PROBE: NOT DETECTED
SODIUM SERPL-SCNC: 139 MMOL/L (ref 135–147)
WBC # BLD AUTO: 5.85 THOUSAND/UL (ref 4.31–10.16)

## 2023-05-06 RX ORDER — AMOXICILLIN 250 MG/1
500 CAPSULE ORAL ONCE
Status: COMPLETED | OUTPATIENT
Start: 2023-05-06 | End: 2023-05-06

## 2023-05-06 RX ORDER — KETOROLAC TROMETHAMINE 30 MG/ML
15 INJECTION, SOLUTION INTRAMUSCULAR; INTRAVENOUS ONCE
Status: COMPLETED | OUTPATIENT
Start: 2023-05-06 | End: 2023-05-06

## 2023-05-06 RX ORDER — AMOXICILLIN 500 MG/1
500 CAPSULE ORAL 3 TIMES DAILY
Qty: 21 CAPSULE | Refills: 0 | Status: SHIPPED | OUTPATIENT
Start: 2023-05-06 | End: 2023-05-13

## 2023-05-06 RX ORDER — ACETAMINOPHEN 325 MG/1
975 TABLET ORAL ONCE
Status: COMPLETED | OUTPATIENT
Start: 2023-05-06 | End: 2023-05-06

## 2023-05-06 RX ADMIN — KETOROLAC TROMETHAMINE 15 MG: 30 INJECTION, SOLUTION INTRAMUSCULAR at 13:57

## 2023-05-06 RX ADMIN — AMOXICILLIN 500 MG: 250 CAPSULE ORAL at 16:43

## 2023-05-06 RX ADMIN — ACETAMINOPHEN 975 MG: 325 TABLET ORAL at 16:43
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
## 2023-05-06 NOTE — DISCHARGE INSTRUCTIONS
Follow-up with your primary care physician  Take the prescribed antibiotic as directed  You can take Tylenol every 6 hours as needed for pain  Please return to the emergency department if you develop worsening symptoms, severe pain, difficulty swallowing, chest pain, or anything else concerning to you

## 2023-05-06 NOTE — ED NOTES
Pt requested a beverage and something to eat, pt provided with a meal as per request     Juan Wyatt RN  05/06/23 5944

## 2023-05-07 ENCOUNTER — HOSPITAL ENCOUNTER (INPATIENT)
Facility: HOSPITAL | Age: 58
LOS: 1 days | Discharge: ANOTHER HOSPITAL | End: 2023-05-09
Attending: EMERGENCY MEDICINE | Admitting: INTERNAL MEDICINE

## 2023-05-07 DIAGNOSIS — I48.92 ATRIAL FLUTTER WITH RAPID VENTRICULAR RESPONSE (HCC): ICD-10-CM

## 2023-05-07 DIAGNOSIS — I48.92 ATRIAL FLUTTER (HCC): Primary | ICD-10-CM

## 2023-05-07 RX ORDER — SODIUM CHLORIDE, SODIUM GLUCONATE, SODIUM ACETATE, POTASSIUM CHLORIDE, MAGNESIUM CHLORIDE, SODIUM PHOSPHATE, DIBASIC, AND POTASSIUM PHOSPHATE .53; .5; .37; .037; .03; .012; .00082 G/100ML; G/100ML; G/100ML; G/100ML; G/100ML; G/100ML; G/100ML
500 INJECTION, SOLUTION INTRAVENOUS ONCE
Status: COMPLETED | OUTPATIENT
Start: 2023-05-07 | End: 2023-05-08

## 2023-05-07 RX ADMIN — SODIUM CHLORIDE, SODIUM GLUCONATE, SODIUM ACETATE, POTASSIUM CHLORIDE, MAGNESIUM CHLORIDE, SODIUM PHOSPHATE, DIBASIC, AND POTASSIUM PHOSPHATE 500 ML: .53; .5; .37; .037; .03; .012; .00082 INJECTION, SOLUTION INTRAVENOUS at 23:48

## 2023-05-08 ENCOUNTER — APPOINTMENT (INPATIENT)
Dept: RADIOLOGY | Facility: HOSPITAL | Age: 58
End: 2023-05-08

## 2023-05-08 ENCOUNTER — APPOINTMENT (EMERGENCY)
Dept: CT IMAGING | Facility: HOSPITAL | Age: 58
End: 2023-05-08

## 2023-05-08 PROBLEM — B18.2 CHRONIC HEPATITIS C WITHOUT HEPATIC COMA (HCC): Status: ACTIVE | Noted: 2022-01-30

## 2023-05-08 PROBLEM — R93.89 ABNORMAL FINDING ON CT SCAN: Status: ACTIVE | Noted: 2023-05-08

## 2023-05-08 PROBLEM — I50.32 CHRONIC HEART FAILURE WITH PRESERVED EJECTION FRACTION (HFPEF) (HCC): Status: ACTIVE | Noted: 2022-10-13

## 2023-05-08 PROBLEM — I10 PRIMARY HYPERTENSION: Status: ACTIVE | Noted: 2022-06-09

## 2023-05-08 LAB
2HR DELTA HS TROPONIN: -7 NG/L
4HR DELTA HS TROPONIN: -6 NG/L
ALBUMIN SERPL BCP-MCNC: 3.8 G/DL (ref 3.5–5)
ALP SERPL-CCNC: 61 U/L (ref 34–104)
ALT SERPL W P-5'-P-CCNC: 11 U/L (ref 7–52)
ANION GAP SERPL CALCULATED.3IONS-SCNC: 5 MMOL/L (ref 4–13)
ANION GAP SERPL CALCULATED.3IONS-SCNC: 8 MMOL/L (ref 4–13)
AST SERPL W P-5'-P-CCNC: 12 U/L (ref 13–39)
ATRIAL RATE: 138 BPM
ATRIAL RATE: 288 BPM
BACTERIA UR QL AUTO: ABNORMAL /HPF
BASOPHILS # BLD AUTO: 0.02 THOUSANDS/ÂΜL (ref 0–0.1)
BASOPHILS NFR BLD AUTO: 0 % (ref 0–1)
BILIRUB SERPL-MCNC: 0.71 MG/DL (ref 0.2–1)
BILIRUB UR QL STRIP: NEGATIVE
BNP SERPL-MCNC: 1328 PG/ML (ref 0–100)
BUN SERPL-MCNC: 15 MG/DL (ref 5–25)
BUN SERPL-MCNC: 18 MG/DL (ref 5–25)
CALCIUM SERPL-MCNC: 7.9 MG/DL (ref 8.4–10.2)
CALCIUM SERPL-MCNC: 8.5 MG/DL (ref 8.4–10.2)
CARDIAC TROPONIN I PNL SERPL HS: 43 NG/L
CARDIAC TROPONIN I PNL SERPL HS: 44 NG/L
CARDIAC TROPONIN I PNL SERPL HS: 50 NG/L
CHLORIDE SERPL-SCNC: 107 MMOL/L (ref 96–108)
CHLORIDE SERPL-SCNC: 107 MMOL/L (ref 96–108)
CLARITY UR: CLEAR
CO2 SERPL-SCNC: 24 MMOL/L (ref 21–32)
CO2 SERPL-SCNC: 25 MMOL/L (ref 21–32)
COLOR UR: YELLOW
CREAT SERPL-MCNC: 1.49 MG/DL (ref 0.6–1.3)
CREAT SERPL-MCNC: 1.57 MG/DL (ref 0.6–1.3)
D DIMER PPP FEU-MCNC: 0.61 UG/ML FEU
EOSINOPHIL # BLD AUTO: 0.03 THOUSAND/ÂΜL (ref 0–0.61)
EOSINOPHIL NFR BLD AUTO: 0 % (ref 0–6)
ERYTHROCYTE [DISTWIDTH] IN BLOOD BY AUTOMATED COUNT: 15.5 % (ref 11.6–15.1)
ERYTHROCYTE [DISTWIDTH] IN BLOOD BY AUTOMATED COUNT: 15.6 % (ref 11.6–15.1)
FLUAV RNA RESP QL NAA+PROBE: NEGATIVE
FLUBV RNA RESP QL NAA+PROBE: NEGATIVE
GFR SERPL CREATININE-BSD FRML MDRD: 36 ML/MIN/1.73SQ M
GFR SERPL CREATININE-BSD FRML MDRD: 38 ML/MIN/1.73SQ M
GLUCOSE SERPL-MCNC: 104 MG/DL (ref 65–140)
GLUCOSE SERPL-MCNC: 172 MG/DL (ref 65–140)
GLUCOSE UR STRIP-MCNC: NEGATIVE MG/DL
HCT VFR BLD AUTO: 30.1 % (ref 34.8–46.1)
HCT VFR BLD AUTO: 32.3 % (ref 34.8–46.1)
HGB BLD-MCNC: 9.2 G/DL (ref 11.5–15.4)
HGB BLD-MCNC: 9.9 G/DL (ref 11.5–15.4)
HGB UR QL STRIP.AUTO: NEGATIVE
IMM GRANULOCYTES # BLD AUTO: 0.02 THOUSAND/UL (ref 0–0.2)
IMM GRANULOCYTES NFR BLD AUTO: 0 % (ref 0–2)
KETONES UR STRIP-MCNC: NEGATIVE MG/DL
LEUKOCYTE ESTERASE UR QL STRIP: ABNORMAL
LIPASE SERPL-CCNC: 66 U/L (ref 11–82)
LYMPHOCYTES # BLD AUTO: 1 THOUSANDS/ÂΜL (ref 0.6–4.47)
LYMPHOCYTES NFR BLD AUTO: 14 % (ref 14–44)
MAGNESIUM SERPL-MCNC: 1.9 MG/DL (ref 1.9–2.7)
MAGNESIUM SERPL-MCNC: 2.6 MG/DL (ref 1.9–2.7)
MCH RBC QN AUTO: 25.6 PG (ref 26.8–34.3)
MCH RBC QN AUTO: 25.6 PG (ref 26.8–34.3)
MCHC RBC AUTO-ENTMCNC: 30.6 G/DL (ref 31.4–37.4)
MCHC RBC AUTO-ENTMCNC: 30.7 G/DL (ref 31.4–37.4)
MCV RBC AUTO: 84 FL (ref 82–98)
MCV RBC AUTO: 84 FL (ref 82–98)
MONOCYTES # BLD AUTO: 0.58 THOUSAND/ÂΜL (ref 0.17–1.22)
MONOCYTES NFR BLD AUTO: 8 % (ref 4–12)
NEUTROPHILS # BLD AUTO: 5.7 THOUSANDS/ÂΜL (ref 1.85–7.62)
NEUTS SEG NFR BLD AUTO: 78 % (ref 43–75)
NITRITE UR QL STRIP: NEGATIVE
NON-SQ EPI CELLS URNS QL MICRO: ABNORMAL /HPF
NRBC BLD AUTO-RTO: 0 /100 WBCS
P AXIS: 109 DEGREES
PH UR STRIP.AUTO: 6 [PH]
PHOSPHATE SERPL-MCNC: 2.9 MG/DL (ref 2.7–4.5)
PLATELET # BLD AUTO: 109 THOUSANDS/UL (ref 149–390)
PLATELET # BLD AUTO: 138 THOUSANDS/UL (ref 149–390)
PMV BLD AUTO: 11 FL (ref 8.9–12.7)
PMV BLD AUTO: 12.2 FL (ref 8.9–12.7)
POTASSIUM SERPL-SCNC: 3.8 MMOL/L (ref 3.5–5.3)
POTASSIUM SERPL-SCNC: 4.3 MMOL/L (ref 3.5–5.3)
PR INTERVAL: 184 MS
PROT SERPL-MCNC: 7.1 G/DL (ref 6.4–8.4)
PROT UR STRIP-MCNC: ABNORMAL MG/DL
QRS AXIS: -54 DEGREES
QRS AXIS: -56 DEGREES
QRS AXIS: -86 DEGREES
QRSD INTERVAL: 160 MS
QRSD INTERVAL: 164 MS
QRSD INTERVAL: 164 MS
QT INTERVAL: 360 MS
QT INTERVAL: 400 MS
QT INTERVAL: 422 MS
QTC INTERVAL: 545 MS
QTC INTERVAL: 554 MS
QTC INTERVAL: 565 MS
RBC # BLD AUTO: 3.6 MILLION/UL (ref 3.81–5.12)
RBC # BLD AUTO: 3.86 MILLION/UL (ref 3.81–5.12)
RBC #/AREA URNS AUTO: ABNORMAL /HPF
RSV RNA RESP QL NAA+PROBE: NEGATIVE
SARS-COV-2 RNA RESP QL NAA+PROBE: NEGATIVE
SODIUM SERPL-SCNC: 137 MMOL/L (ref 135–147)
SODIUM SERPL-SCNC: 139 MMOL/L (ref 135–147)
SP GR UR STRIP.AUTO: 1.02 (ref 1–1.03)
T WAVE AXIS: 110 DEGREES
T WAVE AXIS: 115 DEGREES
T WAVE AXIS: 88 DEGREES
UROBILINOGEN UR STRIP-ACNC: 3 MG/DL
VENTRICULAR RATE: 104 BPM
VENTRICULAR RATE: 120 BPM
VENTRICULAR RATE: 138 BPM
WBC # BLD AUTO: 3.89 THOUSAND/UL (ref 4.31–10.16)
WBC # BLD AUTO: 7.35 THOUSAND/UL (ref 4.31–10.16)
WBC #/AREA URNS AUTO: ABNORMAL /HPF

## 2023-05-08 RX ORDER — FUROSEMIDE 40 MG/1
40 TABLET ORAL DAILY
Status: DISCONTINUED | OUTPATIENT
Start: 2023-05-08 | End: 2023-05-08

## 2023-05-08 RX ORDER — FUROSEMIDE 10 MG/ML
80 INJECTION INTRAMUSCULAR; INTRAVENOUS ONCE
Status: DISCONTINUED | OUTPATIENT
Start: 2023-05-08 | End: 2023-05-08

## 2023-05-08 RX ORDER — DILTIAZEM HYDROCHLORIDE 5 MG/ML
10 INJECTION INTRAVENOUS ONCE
Status: COMPLETED | OUTPATIENT
Start: 2023-05-08 | End: 2023-05-08

## 2023-05-08 RX ORDER — DOXAZOSIN MESYLATE 1 MG/1
2 TABLET ORAL
Status: DISCONTINUED | OUTPATIENT
Start: 2023-05-08 | End: 2023-05-09 | Stop reason: HOSPADM

## 2023-05-08 RX ORDER — AMOXICILLIN 250 MG/1
500 CAPSULE ORAL 3 TIMES DAILY
Status: DISCONTINUED | OUTPATIENT
Start: 2023-05-08 | End: 2023-05-09 | Stop reason: HOSPADM

## 2023-05-08 RX ORDER — DILTIAZEM HYDROCHLORIDE 180 MG/1
180 CAPSULE, COATED, EXTENDED RELEASE ORAL DAILY
Status: DISCONTINUED | OUTPATIENT
Start: 2023-05-08 | End: 2023-05-08

## 2023-05-08 RX ORDER — PANTOPRAZOLE SODIUM 40 MG/1
40 TABLET, DELAYED RELEASE ORAL
Status: DISCONTINUED | OUTPATIENT
Start: 2023-05-08 | End: 2023-05-09 | Stop reason: HOSPADM

## 2023-05-08 RX ORDER — BUTALBITAL, ACETAMINOPHEN AND CAFFEINE 50; 325; 40 MG/1; MG/1; MG/1
1 TABLET ORAL EVERY 4 HOURS PRN
Status: DISCONTINUED | OUTPATIENT
Start: 2023-05-08 | End: 2023-05-09 | Stop reason: HOSPADM

## 2023-05-08 RX ORDER — METOCLOPRAMIDE HYDROCHLORIDE 5 MG/ML
10 INJECTION INTRAMUSCULAR; INTRAVENOUS ONCE
Status: COMPLETED | OUTPATIENT
Start: 2023-05-08 | End: 2023-05-08

## 2023-05-08 RX ORDER — ACETAMINOPHEN 325 MG/1
650 TABLET ORAL ONCE
Status: COMPLETED | OUTPATIENT
Start: 2023-05-08 | End: 2023-05-08

## 2023-05-08 RX ORDER — DILTIAZEM HYDROCHLORIDE 240 MG/1
240 CAPSULE, COATED, EXTENDED RELEASE ORAL DAILY
Status: DISCONTINUED | OUTPATIENT
Start: 2023-05-09 | End: 2023-05-09 | Stop reason: HOSPADM

## 2023-05-08 RX ORDER — LISINOPRIL 20 MG/1
20 TABLET ORAL DAILY
Status: DISCONTINUED | OUTPATIENT
Start: 2023-05-08 | End: 2023-05-09 | Stop reason: HOSPADM

## 2023-05-08 RX ORDER — POTASSIUM CHLORIDE 20 MEQ/1
40 TABLET, EXTENDED RELEASE ORAL ONCE
Status: COMPLETED | OUTPATIENT
Start: 2023-05-08 | End: 2023-05-08

## 2023-05-08 RX ORDER — LANOLIN ALCOHOL/MO/W.PET/CERES
3 CREAM (GRAM) TOPICAL
Status: DISCONTINUED | OUTPATIENT
Start: 2023-05-08 | End: 2023-05-09 | Stop reason: HOSPADM

## 2023-05-08 RX ORDER — DILTIAZEM HYDROCHLORIDE 60 MG/1
60 TABLET, FILM COATED ORAL ONCE
Status: COMPLETED | OUTPATIENT
Start: 2023-05-08 | End: 2023-05-08

## 2023-05-08 RX ORDER — ACETAMINOPHEN 325 MG/1
975 TABLET ORAL EVERY 8 HOURS
Status: DISCONTINUED | OUTPATIENT
Start: 2023-05-08 | End: 2023-05-09 | Stop reason: HOSPADM

## 2023-05-08 RX ORDER — ACETAMINOPHEN 325 MG/1
975 TABLET ORAL EVERY 6 HOURS PRN
Status: DISCONTINUED | OUTPATIENT
Start: 2023-05-08 | End: 2023-05-08

## 2023-05-08 RX ORDER — MAGNESIUM SULFATE HEPTAHYDRATE 40 MG/ML
2 INJECTION, SOLUTION INTRAVENOUS ONCE
Status: COMPLETED | OUTPATIENT
Start: 2023-05-08 | End: 2023-05-08

## 2023-05-08 RX ORDER — DIPHENHYDRAMINE HYDROCHLORIDE 50 MG/ML
25 INJECTION INTRAMUSCULAR; INTRAVENOUS ONCE
Status: COMPLETED | OUTPATIENT
Start: 2023-05-08 | End: 2023-05-08

## 2023-05-08 RX ORDER — NYSTATIN 100000 [USP'U]/G
POWDER TOPICAL 2 TIMES DAILY
Status: DISCONTINUED | OUTPATIENT
Start: 2023-05-08 | End: 2023-05-09 | Stop reason: HOSPADM

## 2023-05-08 RX ORDER — NICOTINE 21 MG/24HR
1 PATCH, TRANSDERMAL 24 HOURS TRANSDERMAL DAILY
Status: DISCONTINUED | OUTPATIENT
Start: 2023-05-08 | End: 2023-05-09 | Stop reason: HOSPADM

## 2023-05-08 RX ORDER — FUROSEMIDE 10 MG/ML
80 INJECTION INTRAMUSCULAR; INTRAVENOUS
Status: DISCONTINUED | OUTPATIENT
Start: 2023-05-08 | End: 2023-05-09 | Stop reason: HOSPADM

## 2023-05-08 RX ADMIN — BUTALBITAL, ACETAMINOPHEN AND CAFFEINE 1 TABLET: 50; 325; 40 TABLET ORAL at 20:58

## 2023-05-08 RX ADMIN — FUROSEMIDE 40 MG: 40 TABLET ORAL at 09:54

## 2023-05-08 RX ADMIN — DILTIAZEM HYDROCHLORIDE 180 MG: 180 CAPSULE, COATED, EXTENDED RELEASE ORAL at 09:54

## 2023-05-08 RX ADMIN — RIVAROXABAN 15 MG: 15 TABLET, FILM COATED ORAL at 17:30

## 2023-05-08 RX ADMIN — ACETAMINOPHEN 975 MG: 325 TABLET ORAL at 09:53

## 2023-05-08 RX ADMIN — MAGNESIUM SULFATE HEPTAHYDRATE 2 G: 40 INJECTION, SOLUTION INTRAVENOUS at 02:24

## 2023-05-08 RX ADMIN — DILTIAZEM HYDROCHLORIDE 60 MG: 60 TABLET, FILM COATED ORAL at 12:08

## 2023-05-08 RX ADMIN — METOCLOPRAMIDE 10 MG: 5 INJECTION, SOLUTION INTRAMUSCULAR; INTRAVENOUS at 01:43

## 2023-05-08 RX ADMIN — METOPROLOL SUCCINATE 150 MG: 100 TABLET, EXTENDED RELEASE ORAL at 20:58

## 2023-05-08 RX ADMIN — AMOXICILLIN 500 MG: 250 CAPSULE ORAL at 13:24

## 2023-05-08 RX ADMIN — AMOXICILLIN 500 MG: 250 CAPSULE ORAL at 20:57

## 2023-05-08 RX ADMIN — POTASSIUM CHLORIDE 40 MEQ: 1500 TABLET, EXTENDED RELEASE ORAL at 09:54

## 2023-05-08 RX ADMIN — AMIODARONE HYDROCHLORIDE 1 MG/MIN: 50 INJECTION, SOLUTION INTRAVENOUS at 01:12

## 2023-05-08 RX ADMIN — METOPROLOL SUCCINATE 150 MG: 100 TABLET, EXTENDED RELEASE ORAL at 06:07

## 2023-05-08 RX ADMIN — DILTIAZEM HYDROCHLORIDE 10 MG: 5 INJECTION INTRAVENOUS at 02:33

## 2023-05-08 RX ADMIN — DOXAZOSIN 2 MG: 1 TABLET ORAL at 20:57

## 2023-05-08 RX ADMIN — NICOTINE 1 PATCH: 21 PATCH, EXTENDED RELEASE TRANSDERMAL at 09:57

## 2023-05-08 RX ADMIN — PANTOPRAZOLE SODIUM 40 MG: 40 TABLET, DELAYED RELEASE ORAL at 06:07

## 2023-05-08 RX ADMIN — DIPHENHYDRAMINE HYDROCHLORIDE 25 MG: 50 INJECTION, SOLUTION INTRAMUSCULAR; INTRAVENOUS at 01:43

## 2023-05-08 RX ADMIN — LISINOPRIL 20 MG: 20 TABLET ORAL at 09:53

## 2023-05-08 RX ADMIN — ACETAMINOPHEN 975 MG: 325 TABLET ORAL at 20:58

## 2023-05-08 RX ADMIN — DEXTROSE 150 MG: 50 INJECTION, SOLUTION INTRAVENOUS at 00:48

## 2023-05-08 RX ADMIN — AMOXICILLIN 500 MG: 250 CAPSULE ORAL at 06:07

## 2023-05-08 RX ADMIN — FUROSEMIDE 80 MG: 10 INJECTION, SOLUTION INTRAMUSCULAR; INTRAVENOUS at 12:08

## 2023-05-08 RX ADMIN — DILTIAZEM HYDROCHLORIDE 10 MG: 5 INJECTION INTRAVENOUS at 03:27

## 2023-05-08 RX ADMIN — ACETAMINOPHEN 650 MG: 325 TABLET ORAL at 00:52

## 2023-05-08 RX ADMIN — BUTALBITAL, ACETAMINOPHEN AND CAFFEINE 1 TABLET: 50; 325; 40 TABLET ORAL at 13:24

## 2023-05-08 RX ADMIN — TRIMETHOBENZAMIDE HYDROCHLORIDE 200 MG: 100 INJECTION INTRAMUSCULAR at 18:09

## 2023-05-08 NOTE — ASSESSMENT & PLAN NOTE
POA: Chronic normocytic anemia with hemoglobin 10 at baseline (9-12)  Likely anemia of CKD  Previous iron panel in 2021 consistent with anemia of chronic disease      Plan:  · Monitor Hgb with daily CBC

## 2023-05-08 NOTE — ASSESSMENT & PLAN NOTE
POA: Elevated BP  On lisinopril 20 mg daily, Cardizem 180 mg daily, and Lasix 40 mg daily  Plan:  · Monitor BP   · Continue home regimen    Increase Cardizem to 240 per cardiology

## 2023-05-08 NOTE — ASSESSMENT & PLAN NOTE
1   Mild fat stranding about the pancreatic head and distal stomach/proximal duodenum, correlate with lipase for pancreatitis  Alternatively, findings may also be due to gastritis/duodenitis, correlate with endoscopy to evaluate for underlying peptic   ulcer disease  2   Mild pulmonary edema  Trace right pleural effusion  Trace right lower lobe atelectasis  3   2 mm nonobstructive calculus in the right kidney  No hydronephrosis  4   Diverticulosis without evidence of diverticulitis

## 2023-05-08 NOTE — ASSESSMENT & PLAN NOTE
Lab Results   Component Value Date    EGFR 36 05/07/2023    EGFR 31 05/06/2023    EGFR 28 04/06/2023    CREATININE 1 57 (H) 05/07/2023    CREATININE 1 77 (H) 05/06/2023    CREATININE 1 89 (H) 04/06/2023     POA: Cr 1 6 at baseline (1 6-1 9)  Estimated Creatinine Clearance: 47 7 mL/min (A) (by C-G formula based on SCr of 1 57 mg/dL (H))      Plan:  · Monitor UOP and renal indices  · Avoid hypotension and nephrotoxins  · Renally dosed medications as appropriate

## 2023-05-08 NOTE — ASSESSMENT & PLAN NOTE
POA: Asymptomatic incidental findings on CT A/P w/o contrast as follows:  · Mild fat stranding about the pancreatic head and distal stomach/proximal duodenum, correlate with lipase for pancreatitis  Alternatively, findings may also be due to gastritis/duodenitis, correlate with endoscopy to evaluate for underlying peptic  No ulcer disease  (Note: No clinical evidence for pancreatitis with normal lipase and no epigastric pain on admission)  · 2 mm nonobstructive calculus in the right kidney  No hydronephrosis  · Diverticulosis without evidence of diverticulitis      Plan:  · Monitor s/s clinically while inpatient   · F/u with PCP for further evaluation/management as deemed appropriate

## 2023-05-08 NOTE — ED CARE HANDOFF
Emergency Department Sign Out Note        Sign out and transfer of care from ***  See Separate Emergency Department note  The patient, Pari Raines, was evaluated by the previous provider for ***  Workup Completed:  ***    ED Course / Workup Pending (followup):  ***                                     Procedures  MDM        Disposition  Final diagnoses:   None     ED Disposition     None      Follow-up Information    None       Patient's Medications   Discharge Prescriptions    No medications on file     No discharge procedures on file         ED Provider  Electronically Signed by
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
## 2023-05-08 NOTE — ED ATTENDING ATTESTATION
5/7/2023  IDania MD, saw and evaluated the patient  I have discussed the patient with the resident/non-physician practitioner and agree with the resident's/non-physician practitioner's findings, Plan of Care, and MDM as documented in the resident's/non-physician practitioner's note, except where noted  All available labs and Radiology studies were reviewed  I was present for key portions of any procedure(s) performed by the resident/non-physician practitioner and I was immediately available to provide assistance  At this point I agree with the current assessment done in the Emergency Department  I have conducted an independent evaluation of this patient a history and physical is as follows:    Chest pain, radiating to the R arm  Hx AICD/PACER  Hx complex arrhythmia interventions including ablations, and is managed on multiple medications  Has central back pain radiating to the flank  Unclear if it is related to the CP  VS and nursing notes reviewed  General: Appears in NAD, awake, alert, speaking normally in full sentences  Well-nourished, well-developed  Appears stated age  Head: Normocephalic, atraumatic  Eyes: EOMI  Vision grossly normal  No subconjunctival hemorrhages or occular discharge noted  Symmetrical lids  ENT: Atraumatic external nose and ears  No stridor  Normal phonation  No drooling  Normal swallowing  Neck: No JVD  FROM  No goiter  CV: No pallor  Tachycardic rate  Lungs: No tachypnea  No respiratory distress  Abdomen: Right CVA tenderness  Abdomen soft nontender  MSK: Moving all extremities equally, no peripheral edema  Skin: Dry, intact  No cyanosis  Neuro: Awake, alert, GCS15  CN II-XII grossly intact  Grossly normal gait  Psychiatric/Behavioral: Appropriate mood and affect  A/P: This is a 62 y o  female who presents to the ED for evaluation of tachycardia, chest pain  Patient has longstanding history of atrial dysrhythmias  She was previously ablated    She does have a AICD pacemaker  She appears to be in another atrial arrhythmia  This may be the cause of her symptoms  She was seen earlier today and ACS was ruled out  We will get cardiac labs including troponin as the chest pain has worsened  She appears to have previously needed amiodarone on review of prior records, therefore we will initiate amnio bolus and drip on this patient  Should that not improve her tachycardia, may need small dose of diltiazem to help decrease rate in the meantime until the amiodarone is effective  Patient will need to be admitted  We will get abdomen pelvis CAT scan to rule out acute abnormalities for the flank pain        ED Course       Critical Care Time  CriticalCare Time    Date/Time: 5/8/2023 7:30 AM  Performed by: Andres Elmore MD  Authorized by: Andres Elmore MD     Critical care provider statement:     Critical care time (minutes):  38    Critical care time was exclusive of:  Separately billable procedures and treating other patients and teaching time    Critical care was necessary to treat or prevent imminent or life-threatening deterioration of the following conditions:  Cardiac failure    Critical care was time spent personally by me on the following activities:  Obtaining history from patient or surrogate, development of treatment plan with patient or surrogate, evaluation of patient's response to treatment, examination of patient, review of old charts, re-evaluation of patient's condition, ordering and review of laboratory studies, ordering and performing treatments and interventions and ordering and review of radiographic studies
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
## 2023-05-08 NOTE — ASSESSMENT & PLAN NOTE
Wt Readings from Last 3 Encounters:   05/08/23 101 kg (222 lb)   05/06/23 101 kg (222 lb)   04/17/23 108 kg (237 lb)       Intake/Output Summary (Last 24 hours) at 5/8/2023 0626  Last data filed at 5/8/2023 0441  Gross per 24 hour   Intake 650 ml   Output 200 ml   Net 450 ml     POA: Chronic diastolic dysfunction, stable  Maintained on Lasix 40 mg daily  Patient reports adherent to treatment and low-sodium diet at home  Reports shortness of breath on presentation likely in the setting of uncontrolled Aflutter with RVR improved with HR control and placed on 2L O2 via NC with stable SpO2 in mid to high 90s  · 4/3/23 Echo showed severe asymmetric hypertrophy of the septal wall suggesting possible HCM with normal systolic function and LVEF 65%  No regional wall motion abnormality and unable to assess diastolic function due to A-fib  · CT abdomen pelvis without contrast showed mild pulmonary edema with trace right pleural effusion and race right lower lobe atelectasis   No overt signs of volume overload on exam     Plan:  · Monitor volume status and respiratory status  · Monitor strict I/O and daily wt with standing scale  · Continue mental O2 as needed, wean as able - maintain SpO2 > 88%  · Encourage incentive spirometry every 2 hours while awake  · Cardiac diet with 2g sodium and 1500 mL FR  · Diuresis per cardiology

## 2023-05-08 NOTE — ASSESSMENT & PLAN NOTE
POA: 62year-old female with Aflutter s/p cardioversion back to sinus rhythm on 4/6/23, chronic HFpEF, HTN, cocaine abuse, and remote h/o VT s/p ICD placement  presenting with palpitations and chest discomfort d/t recurrent Aflutter with RVR requiring amiodarone gtt with heart rate and symptoms improved and hemodynamically stable on admission  · Recently admitted due to symptomatic atypical Aflutter s/p cardioversion on 4/6/23 and discharged home on Toprol  mg twice daily and Cardizem (CD) 180 mg daily per Cardiology recs  [id number] 4 on anticoagulation therapy with Xarelto 15 mg daily  Pt reports adherence to treatment  · Mg and K within normal limits  Initial ECG showed sinus tachycardia (although likely atypical Aflutter with RVR) with  bpm  Received total Cardizem 20 mg IV and started on amiodarone gtt in the ED  Repeat ECG showing A flutter with variable AV block rate 104 and prolonged QTc 554  Trop x3 neg  Plan:  · Cardiology increase Cardizem to 240 and continue current metoprolol dose  · Diuresis per cardiology  · Continue Xarelto  · Cardiology also contacted EP and recommended transfer to [place]    Awaiting for placement

## 2023-05-08 NOTE — ASSESSMENT & PLAN NOTE
POA: Known h/o chronic hep C with mildly decrease AST 12 on admission  No RUQ pain   Liver and biliary tree unremarkable on CT A/P w/o contrast      Plan:  · Monitor s/s clinically  · Trend CMP as needed  · F/u with Outpatient GI/Hepatology
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
## 2023-05-08 NOTE — PLAN OF CARE
Problem: PAIN - ADULT  Goal: Verbalizes/displays adequate comfort level or baseline comfort level  Description: Interventions:  - Encourage patient to monitor pain and request assistance  - Assess pain using appropriate pain scale  - Administer analgesics based on type and severity of pain and evaluate response  - Implement non-pharmacological measures as appropriate and evaluate response  - Consider cultural and social influences on pain and pain management  - Notify physician/advanced practitioner if interventions unsuccessful or patient reports new pain  Outcome: Progressing     Problem: INFECTION - ADULT  Goal: Absence or prevention of progression during hospitalization  Description: INTERVENTIONS:  - Assess and monitor for signs and symptoms of infection  - Monitor lab/diagnostic results  - Monitor all insertion sites, i e  indwelling lines, tubes, and drains  - Monitor endotracheal if appropriate and nasal secretions for changes in amount and color  - Royal appropriate cooling/warming therapies per order  - Administer medications as ordered  - Instruct and encourage patient and family to use good hand hygiene technique  - Identify and instruct in appropriate isolation precautions for identified infection/condition  Outcome: Progressing  Goal: Absence of fever/infection during neutropenic period  Description: INTERVENTIONS:  - Monitor WBC    Outcome: Progressing     Problem: SAFETY ADULT  Goal: Patient will remain free of falls  Description: INTERVENTIONS:  - Educate patient/family on patient safety including physical limitations  - Instruct patient to call for assistance with activity   - Consult OT/PT to assist with strengthening/mobility   - Keep Call bell within reach  - Keep bed low and locked with side rails adjusted as appropriate  - Keep care items and personal belongings within reach  - Initiate and maintain comfort rounds  - Make Fall Risk Sign visible to staff  - Offer Toileting every  Hours, in advance of need  - Initiate/Maintain alarm  - Obtain necessary fall risk management equipment:   - Apply yellow socks and bracelet for high fall risk patients  - Consider moving patient to room near nurses station  Outcome: Progressing  Goal: Maintain or return to baseline ADL function  Description: INTERVENTIONS:  -  Assess patient's ability to carry out ADLs; assess patient's baseline for ADL function and identify physical deficits which impact ability to perform ADLs (bathing, care of mouth/teeth, toileting, grooming, dressing, etc )  - Assess/evaluate cause of self-care deficits   - Assess range of motion  - Assess patient's mobility; develop plan if impaired  - Assess patient's need for assistive devices and provide as appropriate  - Encourage maximum independence but intervene and supervise when necessary  - Involve family in performance of ADLs  - Assess for home care needs following discharge   - Consider OT consult to assist with ADL evaluation and planning for discharge  - Provide patient education as appropriate  Outcome: Progressing  Goal: Maintains/Returns to pre admission functional level  Description: INTERVENTIONS:  - Perform BMAT or MOVE assessment daily    - Set and communicate daily mobility goal to care team and patient/family/caregiver  - Collaborate with rehabilitation services on mobility goals if consulted  - Perform Range of Motion  times a day  - Reposition patient every  hours    - Dangle patient  times a day  - Stand patient  times a day  - Ambulate patient  times a day  - Out of bed to chair  times a day   - Out of bed for meals imes a day  - Out of bed for toileting  - Record patient progress and toleration of activity level   Outcome: Progressing     Problem: DISCHARGE PLANNING  Goal: Discharge to home or other facility with appropriate resources  Description: INTERVENTIONS:  - Identify barriers to discharge w/patient and caregiver  - Arrange for needed discharge resources and transportation as appropriate  - Identify discharge learning needs (meds, wound care, etc )  - Arrange for interpretive services to assist at discharge as needed  - Refer to Case Management Department for coordinating discharge planning if the patient needs post-hospital services based on physician/advanced practitioner order or complex needs related to functional status, cognitive ability, or social support system  Outcome: Progressing     Problem: Knowledge Deficit  Goal: Patient/family/caregiver demonstrates understanding of disease process, treatment plan, medications, and discharge instructions  Description: Complete learning assessment and assess knowledge base    Interventions:  - Provide teaching at level of understanding  - Provide teaching via preferred learning methods  Outcome: Progressing

## 2023-05-09 ENCOUNTER — HOSPITAL ENCOUNTER (INPATIENT)
Facility: HOSPITAL | Age: 58
LOS: 3 days | Discharge: HOME | End: 2023-05-12
Attending: INTERNAL MEDICINE | Admitting: INTERNAL MEDICINE

## 2023-05-09 ENCOUNTER — APPOINTMENT (OUTPATIENT)
Dept: RADIOLOGY | Facility: HOSPITAL | Age: 58
End: 2023-05-09

## 2023-05-09 VITALS
TEMPERATURE: 97.4 F | DIASTOLIC BLOOD PRESSURE: 90 MMHG | SYSTOLIC BLOOD PRESSURE: 130 MMHG | BODY MASS INDEX: 37.47 KG/M2 | RESPIRATION RATE: 18 BRPM | WEIGHT: 238.76 LBS | HEART RATE: 96 BPM | HEIGHT: 67 IN | OXYGEN SATURATION: 95 %

## 2023-05-09 DIAGNOSIS — B18.2 CHRONIC HEPATITIS C WITHOUT HEPATIC COMA (HCC): ICD-10-CM

## 2023-05-09 DIAGNOSIS — I50.33 ACUTE ON CHRONIC DIASTOLIC (CONGESTIVE) HEART FAILURE (HCC): ICD-10-CM

## 2023-05-09 DIAGNOSIS — Z72.0 TOBACCO ABUSE: ICD-10-CM

## 2023-05-09 DIAGNOSIS — I48.92 ATRIAL FLUTTER WITH RAPID VENTRICULAR RESPONSE (HCC): ICD-10-CM

## 2023-05-09 DIAGNOSIS — N17.9 AKI (ACUTE KIDNEY INJURY) (HCC): ICD-10-CM

## 2023-05-09 DIAGNOSIS — I48.92 ATRIAL FLUTTER WITH RAPID VENTRICULAR RESPONSE (HCC): Primary | ICD-10-CM

## 2023-05-09 PROBLEM — D61.818 PANCYTOPENIA (HCC): Status: ACTIVE | Noted: 2023-01-14

## 2023-05-09 LAB
ANION GAP SERPL CALCULATED.3IONS-SCNC: 6 MMOL/L (ref 4–13)
BUN SERPL-MCNC: 22 MG/DL (ref 5–25)
CALCIUM SERPL-MCNC: 8.4 MG/DL (ref 8.4–10.2)
CHLORIDE SERPL-SCNC: 107 MMOL/L (ref 96–108)
CO2 SERPL-SCNC: 25 MMOL/L (ref 21–32)
CREAT SERPL-MCNC: 1.6 MG/DL (ref 0.6–1.3)
ERYTHROCYTE [DISTWIDTH] IN BLOOD BY AUTOMATED COUNT: 15.6 % (ref 11.6–15.1)
FERRITIN SERPL-MCNC: 64 NG/ML (ref 8–388)
FOLATE SERPL-MCNC: 15.9 NG/ML (ref 3.1–17.5)
GFR SERPL CREATININE-BSD FRML MDRD: 35 ML/MIN/1.73SQ M
GLUCOSE SERPL-MCNC: 90 MG/DL (ref 65–140)
HCT VFR BLD AUTO: 30.1 % (ref 34.8–46.1)
HGB BLD-MCNC: 9.1 G/DL (ref 11.5–15.4)
IRON SATN MFR SERPL: 5 % (ref 15–50)
IRON SERPL-MCNC: 22 UG/DL (ref 50–170)
MAGNESIUM SERPL-MCNC: 2.3 MG/DL (ref 1.9–2.7)
MCH RBC QN AUTO: 25.3 PG (ref 26.8–34.3)
MCHC RBC AUTO-ENTMCNC: 30.2 G/DL (ref 31.4–37.4)
MCV RBC AUTO: 84 FL (ref 82–98)
PLATELET # BLD AUTO: 110 THOUSANDS/UL (ref 149–390)
PMV BLD AUTO: 12.2 FL (ref 8.9–12.7)
POTASSIUM SERPL-SCNC: 4.1 MMOL/L (ref 3.5–5.3)
RBC # BLD AUTO: 3.59 MILLION/UL (ref 3.81–5.12)
SODIUM SERPL-SCNC: 138 MMOL/L (ref 135–147)
TIBC SERPL-MCNC: 420 UG/DL (ref 250–450)
VIT B12 SERPL-MCNC: 172 PG/ML (ref 100–900)
WBC # BLD AUTO: 3.69 THOUSAND/UL (ref 4.31–10.16)

## 2023-05-09 RX ORDER — BUTALBITAL, ACETAMINOPHEN AND CAFFEINE 50; 325; 40 MG/1; MG/1; MG/1
1 TABLET ORAL EVERY 4 HOURS PRN
Status: DISCONTINUED | OUTPATIENT
Start: 2023-05-09 | End: 2023-05-12 | Stop reason: HOSPADM

## 2023-05-09 RX ORDER — DOXAZOSIN 2 MG/1
2 TABLET ORAL
Status: DISCONTINUED | OUTPATIENT
Start: 2023-05-09 | End: 2023-05-12 | Stop reason: HOSPADM

## 2023-05-09 RX ORDER — AMOXICILLIN 250 MG/1
500 CAPSULE ORAL 3 TIMES DAILY
Status: CANCELLED | OUTPATIENT
Start: 2023-05-09 | End: 2023-05-13

## 2023-05-09 RX ORDER — AMOXICILLIN 500 MG/1
500 CAPSULE ORAL 3 TIMES DAILY
Status: DISCONTINUED | OUTPATIENT
Start: 2023-05-09 | End: 2023-05-12 | Stop reason: HOSPADM

## 2023-05-09 RX ORDER — NYSTATIN 100000 [USP'U]/G
POWDER TOPICAL 2 TIMES DAILY
Status: CANCELLED | OUTPATIENT
Start: 2023-05-09

## 2023-05-09 RX ORDER — PANTOPRAZOLE SODIUM 40 MG/1
40 TABLET, DELAYED RELEASE ORAL
Status: DISCONTINUED | OUTPATIENT
Start: 2023-05-10 | End: 2023-05-12 | Stop reason: HOSPADM

## 2023-05-09 RX ORDER — MAGNESIUM SULFATE HEPTAHYDRATE 40 MG/ML
2 INJECTION, SOLUTION INTRAVENOUS
Status: COMPLETED | OUTPATIENT
Start: 2023-05-10 | End: 2023-05-11

## 2023-05-09 RX ORDER — BUTALBITAL, ACETAMINOPHEN AND CAFFEINE 50; 325; 40 MG/1; MG/1; MG/1
1 TABLET ORAL EVERY 4 HOURS PRN
Status: CANCELLED | OUTPATIENT
Start: 2023-05-09

## 2023-05-09 RX ORDER — DILTIAZEM HYDROCHLORIDE 240 MG/1
240 CAPSULE, COATED, EXTENDED RELEASE ORAL DAILY
Status: CANCELLED | OUTPATIENT
Start: 2023-05-10

## 2023-05-09 RX ORDER — NYSTATIN 100000 [USP'U]/G
POWDER TOPICAL 2 TIMES DAILY
Status: DISCONTINUED | OUTPATIENT
Start: 2023-05-09 | End: 2023-05-12 | Stop reason: HOSPADM

## 2023-05-09 RX ORDER — METOCLOPRAMIDE HYDROCHLORIDE 5 MG/ML
10 INJECTION INTRAMUSCULAR; INTRAVENOUS EVERY 8 HOURS SCHEDULED
Status: DISCONTINUED | OUTPATIENT
Start: 2023-05-09 | End: 2023-05-09 | Stop reason: HOSPADM

## 2023-05-09 RX ORDER — FUROSEMIDE 10 MG/ML
80 INJECTION INTRAMUSCULAR; INTRAVENOUS
Status: CANCELLED | OUTPATIENT
Start: 2023-05-09

## 2023-05-09 RX ORDER — FUROSEMIDE 10 MG/ML
80 INJECTION INTRAMUSCULAR; INTRAVENOUS
Status: DISCONTINUED | OUTPATIENT
Start: 2023-05-10 | End: 2023-05-11

## 2023-05-09 RX ORDER — LANOLIN ALCOHOL/MO/W.PET/CERES
3 CREAM (GRAM) TOPICAL
Status: CANCELLED | OUTPATIENT
Start: 2023-05-09

## 2023-05-09 RX ORDER — METOCLOPRAMIDE HYDROCHLORIDE 5 MG/ML
10 INJECTION INTRAMUSCULAR; INTRAVENOUS EVERY 8 HOURS SCHEDULED
Status: CANCELLED | OUTPATIENT
Start: 2023-05-09 | End: 2023-05-12

## 2023-05-09 RX ORDER — PANTOPRAZOLE SODIUM 40 MG/1
40 TABLET, DELAYED RELEASE ORAL
Status: CANCELLED | OUTPATIENT
Start: 2023-05-10

## 2023-05-09 RX ORDER — DIPHENHYDRAMINE HYDROCHLORIDE 50 MG/ML
25 INJECTION INTRAMUSCULAR; INTRAVENOUS EVERY 8 HOURS PRN
Status: CANCELLED | OUTPATIENT
Start: 2023-05-09 | End: 2023-05-12

## 2023-05-09 RX ORDER — LISINOPRIL 20 MG/1
20 TABLET ORAL DAILY
Status: DISCONTINUED | OUTPATIENT
Start: 2023-05-10 | End: 2023-05-11

## 2023-05-09 RX ORDER — LANOLIN ALCOHOL/MO/W.PET/CERES
3 CREAM (GRAM) TOPICAL
Status: DISCONTINUED | OUTPATIENT
Start: 2023-05-09 | End: 2023-05-10

## 2023-05-09 RX ORDER — DIPHENHYDRAMINE HYDROCHLORIDE 50 MG/ML
25 INJECTION INTRAMUSCULAR; INTRAVENOUS EVERY 8 HOURS PRN
Status: DISCONTINUED | OUTPATIENT
Start: 2023-05-09 | End: 2023-05-09 | Stop reason: HOSPADM

## 2023-05-09 RX ORDER — NICOTINE 21 MG/24HR
1 PATCH, TRANSDERMAL 24 HOURS TRANSDERMAL DAILY
Status: CANCELLED | OUTPATIENT
Start: 2023-05-10

## 2023-05-09 RX ORDER — LISINOPRIL 20 MG/1
20 TABLET ORAL DAILY
Status: CANCELLED | OUTPATIENT
Start: 2023-05-10

## 2023-05-09 RX ORDER — MAGNESIUM SULFATE HEPTAHYDRATE 40 MG/ML
2 INJECTION, SOLUTION INTRAVENOUS
Status: DISCONTINUED | OUTPATIENT
Start: 2023-05-09 | End: 2023-05-09 | Stop reason: HOSPADM

## 2023-05-09 RX ORDER — MAGNESIUM SULFATE HEPTAHYDRATE 40 MG/ML
2 INJECTION, SOLUTION INTRAVENOUS
Status: CANCELLED | OUTPATIENT
Start: 2023-05-10 | End: 2023-05-12

## 2023-05-09 RX ORDER — DIPHENHYDRAMINE HYDROCHLORIDE 50 MG/ML
25 INJECTION INTRAMUSCULAR; INTRAVENOUS EVERY 8 HOURS PRN
Status: DISCONTINUED | OUTPATIENT
Start: 2023-05-09 | End: 2023-05-12 | Stop reason: HOSPADM

## 2023-05-09 RX ORDER — DILTIAZEM HYDROCHLORIDE 240 MG/1
240 CAPSULE, COATED, EXTENDED RELEASE ORAL DAILY
Status: DISCONTINUED | OUTPATIENT
Start: 2023-05-10 | End: 2023-05-10

## 2023-05-09 RX ORDER — ACETAMINOPHEN 325 MG/1
975 TABLET ORAL EVERY 8 HOURS
Status: DISCONTINUED | OUTPATIENT
Start: 2023-05-09 | End: 2023-05-12 | Stop reason: HOSPADM

## 2023-05-09 RX ORDER — METOCLOPRAMIDE HYDROCHLORIDE 5 MG/ML
10 INJECTION INTRAMUSCULAR; INTRAVENOUS EVERY 8 HOURS SCHEDULED
Status: DISCONTINUED | OUTPATIENT
Start: 2023-05-09 | End: 2023-05-12 | Stop reason: HOSPADM

## 2023-05-09 RX ORDER — ACETAMINOPHEN 325 MG/1
975 TABLET ORAL EVERY 8 HOURS
Status: CANCELLED | OUTPATIENT
Start: 2023-05-09

## 2023-05-09 RX ORDER — DOXAZOSIN MESYLATE 1 MG/1
2 TABLET ORAL
Status: CANCELLED | OUTPATIENT
Start: 2023-05-09

## 2023-05-09 RX ORDER — NICOTINE 21 MG/24HR
1 PATCH, TRANSDERMAL 24 HOURS TRANSDERMAL DAILY
Status: DISCONTINUED | OUTPATIENT
Start: 2023-05-10 | End: 2023-05-12 | Stop reason: HOSPADM

## 2023-05-09 RX ADMIN — ACETAMINOPHEN 975 MG: 325 TABLET ORAL at 22:18

## 2023-05-09 RX ADMIN — Medication 3 MG: at 02:39

## 2023-05-09 RX ADMIN — NICOTINE 1 PATCH: 21 PATCH, EXTENDED RELEASE TRANSDERMAL at 09:10

## 2023-05-09 RX ADMIN — METOCLOPRAMIDE 10 MG: 5 INJECTION, SOLUTION INTRAMUSCULAR; INTRAVENOUS at 14:39

## 2023-05-09 RX ADMIN — METOCLOPRAMIDE HYDROCHLORIDE 10 MG: 5 INJECTION INTRAMUSCULAR; INTRAVENOUS at 22:19

## 2023-05-09 RX ADMIN — AMOXICILLIN 500 MG: 500 CAPSULE ORAL at 22:21

## 2023-05-09 RX ADMIN — DILTIAZEM HYDROCHLORIDE 240 MG: 240 CAPSULE, COATED, EXTENDED RELEASE ORAL at 09:09

## 2023-05-09 RX ADMIN — FUROSEMIDE 80 MG: 10 INJECTION, SOLUTION INTRAMUSCULAR; INTRAVENOUS at 09:00

## 2023-05-09 RX ADMIN — RIVAROXABAN 15 MG: 15 TABLET, FILM COATED ORAL at 19:00

## 2023-05-09 RX ADMIN — AMOXICILLIN 500 MG: 250 CAPSULE ORAL at 13:32

## 2023-05-09 RX ADMIN — METOPROLOL SUCCINATE 150 MG: 100 TABLET, EXTENDED RELEASE ORAL at 09:09

## 2023-05-09 RX ADMIN — DIPHENHYDRAMINE HYDROCHLORIDE 25 MG: 50 INJECTION, SOLUTION INTRAMUSCULAR; INTRAVENOUS at 14:39

## 2023-05-09 RX ADMIN — MAGNESIUM SULFATE HEPTAHYDRATE 2 G: 40 INJECTION, SOLUTION INTRAVENOUS at 14:39

## 2023-05-09 RX ADMIN — METOPROLOL SUCCINATE 150 MG: 100 TABLET, EXTENDED RELEASE ORAL at 22:21

## 2023-05-09 RX ADMIN — ACETAMINOPHEN 975 MG: 325 TABLET ORAL at 05:10

## 2023-05-09 RX ADMIN — LISINOPRIL 20 MG: 20 TABLET ORAL at 09:09

## 2023-05-09 RX ADMIN — ACETAMINOPHEN 975 MG: 325 TABLET ORAL at 13:32

## 2023-05-09 RX ADMIN — NYSTATIN: 100000 POWDER TOPICAL at 09:11

## 2023-05-09 RX ADMIN — PANTOPRAZOLE SODIUM 40 MG: 40 TABLET, DELAYED RELEASE ORAL at 05:10

## 2023-05-09 RX ADMIN — AMOXICILLIN 500 MG: 250 CAPSULE ORAL at 05:10

## 2023-05-09 RX ADMIN — MELATONIN 3 MG: at 22:19

## 2023-05-09 RX ADMIN — BUTALBITAL, ACETAMINOPHEN, AND CAFFEINE 1 TABLET: 50; 325; 40 TABLET ORAL at 16:31

## 2023-05-09 RX ADMIN — DOXAZOSIN 2 MG: 2 TABLET ORAL at 22:21

## 2023-05-09 RX ADMIN — BUTALBITAL, ACETAMINOPHEN AND CAFFEINE 1 TABLET: 50; 325; 40 TABLET ORAL at 09:51
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
## 2023-05-09 NOTE — UTILIZATION REVIEW
Initial Clinical Review    Admission: Date/Time/Statement:   Admission Orders (From admission, onward)     Ordered        05/08/23 0428  INPATIENT ADMISSION  Once                      Orders Placed This Encounter   Procedures   • INPATIENT ADMISSION     Standing Status:   Standing     Number of Occurrences:   1     Order Specific Question:   Level of Care     Answer:   Med Surg [16]     Order Specific Question:   Estimated length of stay     Answer:   More than 2 Midnights     Order Specific Question:   Certification     Answer:   I certify that inpatient services are medically necessary for this patient for a duration of greater than two midnights  See H&P and MD Progress Notes for additional information about the patient's course of treatment  ED Arrival Information     Expected   -    Arrival   5/7/2023 22:45    Acuity   Emergent            Means of arrival   Wheelchair    Escorted by   Family Member    Service   Hospitalist    Admission type   Emergency            Arrival complaint   Chest Pain/Cardiac Hx  Chief Complaint   Patient presents with   • Chest Pain     Patient comes in reporting CP that began this morning  Describes it as pressure, radiating into back  Rates pain 10/10  Also reporting SOB  Hx of afib and CHF       Initial Presentation: 62 y o  female with a PMH of A-fib s/p ablation, atypical Aflutter s/p cardioversion on 4/6/23, chronic HFpEF, HTN, stage 3 CKD, GERD, cocaine abuse, and remote h/o VT s/p ICD placement who presents with palpitations and chest discomfort  Patient was recently hospitalized from 4/4 - 4/6/23 for syncope due to dramatic atypical Aflutter with symptoms resolved s/p cardioversion on 4/6/23 and converted back to sinus rhythm with rate controlled on discharge  She was doing well and asymptomatic since discharge until 1 day ago when she was awakened from sleep due to palpitations associated with pressure-like chest discomfort/pain and shortness of breath   Her symptoms rapidly worsened prompting her ED visit  She reports adherence to treatment since discharge and adheres to a low-sodium diet at home  On presentation, she was noted to be tachycardic with initial ECG showed sinus tachycardia (likely atypical Aflutter with RVR per personal review) ventricular rate 138 bpm  She received IV amiodarone and Cardizem in the ED initially with persistent tachycardia in the 120s and started on amiodarone gtt with improved HR  repeat ECG showed a flutter with variable block and ventricular rate of 104  Plan: Inpatient admission for evaluation and treatment of Aflutter with RVR, HTN, CHF, anemia, GERD: telemetry, Utox, amiodarone drip, continue home Toprol XL, Cardizem, Xarelto, lisinopril, Lasix, Protonix, Cardiology consult, cardiac diet with 2 gm Na and 1500 ml fluid restriction, daily CBC  Cardiology consult: continue Toprol XL and Cardizem, amiodarone drip discontinued by EP as it was thought that to be contributing to her atrial flutter with RVR  Continue Xarelto  will increase Cardizem to 240 mg daily starting tomorrow - dose another 60 mg x1 now  Telemetry  Start lasix 80 mg IV BID this afternoon and assess for response - suspect HR improvement with diuresis    Date: 5/9   Day 2:     Cardiology: s/p Cardizem 10 mg IV x2, amiodarone bolus + gtt  Continue telemetry  Cardizem increased to 240 mg daily this morning; continue + ToproL-XL Continue Xarelto  Continue IV lasix 80 mg bid  Internal medicine: Cardiology increase Cardizem to 240 and continue current metoprolol dose  IV lasix 80 mg bid  Continue Xarelto  Cardiology also contacted EP and recommended transfer to [facility]  Awaiting for placement  Daily CBC  Wean oxygen as able  Cardiac diet with 2g sodium and 1500 mL FR  Continue home Protonix 40 mg daily       ED Triage Vitals   Temperature Pulse Respirations Blood Pressure SpO2   05/07/23 2329 05/07/23 2329 05/07/23 2329 05/07/23 2329 05/07/23 2329   98 2 °F (36 8 °C) (!) 140 22 113/65 99 %      Temp Source Heart Rate Source Patient Position - Orthostatic VS BP Location FiO2 (%)   05/07/23 2329 05/07/23 2329 05/07/23 2329 05/07/23 2329 --   Oral Monitor Sitting Left arm       Pain Score       05/08/23 0052       9          Wt Readings from Last 1 Encounters:   05/09/23 108 kg (238 lb 12 1 oz)     Additional Vital Signs:     Date/Time Temp Pulse Resp BP MAP (mmHg) SpO2 Calculated FIO2 (%) - Nasal Cannula Nasal Cannula O2 Flow Rate (L/min) O2 Device Patient Position - Orthostatic VS   05/09/23 1054 97 4 °F (36 3 °C) Abnormal  96 -- 130/90 103 95 % -- -- None (Room air) Lying   05/09/23 08:36:50 98 4 °F (36 9 °C) 57 -- 127/86 100 95 % -- -- -- --   05/08/23 22:30:31 97 5 °F (36 4 °C) 65 18 116/63 81 93 % -- -- -- --   05/08/23 19:35:16 -- 71 -- 167/86 113 93 % -- -- -- Standing - Orthostatic VS   05/08/23 19:33:06 -- 51 Abnormal  -- 117/76 90 94 % -- -- -- Sitting - Orthostatic VS   05/08/23 19:27:25 97 5 °F (36 4 °C) 68 18 120/64 83 94 % -- -- -- Lying - Orthostatic VS   05/08/23 15:37:16 97 9 °F (36 6 °C) 73 18 127/78 94 95 % -- -- -- --   05/08/23 08:06:21 97 8 °F (36 6 °C) 64 -- 127/81 96 94 % -- -- -- --   05/08/23 0545 97 8 °F (36 6 °C) 105 -- 115/72 -- -- -- -- -- Lying   05/08/23 0530 -- 122 Abnormal  -- 154/74 103 96 % -- -- -- --   05/08/23 0500 -- 88 -- 139/79 104 97 % -- -- -- --   05/08/23 0415 -- 107 Abnormal  16 150/61 86 96 % 28 2 L/min Nasal cannula Lying   05/08/23 0345 -- 90 22  108/58 78 95 % 28 2 L/min Nasal cannula Lying   05/08/23 0330 -- 124 Abnormal  -- 133/68 90 95 % 28 2 L/min Nasal cannula Lying   05/08/23 0315 -- 125 Abnormal  -- 129/61 81 95 % 28 2 L/min Nasal cannula Lying   05/08/23 0300 -- 124 Abnormal  -- 135/66 91 95 % 28 2 L/min Nasal cannula Lying   05/08/23 0230 -- 138 Abnormal  20 131/90 105 92 % -- -- None (Room air) Lying   05/08/23 0215 -- 137 Abnormal  20 133/91 108 91 % -- -- None (Room air) Lying   05/08/23 0145 -- 138 Abnormal  20 147/117 Abnormal  125 92 % -- -- None (Room air) Lying   05/08/23 0100 -- 140 Abnormal  20 164/91 114 92 % -- -- None (Room air) Lying   05/08/23 0045 -- 136 Abnormal  20 175/113 Abnormal  137 93 % -- -- None (Room air) Lying   05/08/23 0000 -- 136 Abnormal  20 166/98 116 94 % -- -- None (Room air) Lying     Pertinent Labs/Diagnostic Test Results:   CT abdomen pelvis wo contrast   Final Result by Lynda Fountain MD (05/08 0249)      1  Mild fat stranding about the pancreatic head and distal stomach/proximal duodenum, correlate with lipase for pancreatitis  Alternatively, findings may also be due to gastritis/duodenitis, correlate with endoscopy to evaluate for underlying peptic    ulcer disease  2   Mild pulmonary edema  Trace right pleural effusion  Trace right lower lobe atelectasis  3   2 mm nonobstructive calculus in the right kidney  No hydronephrosis  4   Diverticulosis without evidence of diverticulitis              Workstation performed: [id number]         XR chest portable    (Results Pending)     5/8 EKG:  Atrial flutter with variable A-V block  Right bundle branch block  Left anterior fascicular block  Bifascicular block   Abnormal ECG  When compared with ECG of 07-MAY-2023 23:25, (unconfirmed)  No significant change was found    5/7 EKG:  Atrial flutter with variable A-V block  Right bundle branch block  Left ventricular hypertrophy with repolarization abnormality  Abnormal ECG    Results from last 7 days   Lab Units 05/07/23  2348   SARS-COV-2  Negative     Lab Units 05/09/23  0436 05/08/23  0652 05/07/23  2345   WBC Thousand/uL 3 69* 3 89* 7 35   HEMOGLOBIN g/dL 9 1* 9 2* 9 9*   HEMATOCRIT % 30 1* 30 1* 32 3*   PLATELETS Thousands/uL 110* 109* 138*   NEUTROS ABS Thousands/µL  --   --  5 70         Lab Units 05/09/23  0436 05/08/23  0652 05/07/23  2345   SODIUM mmol/L 138 137 139   POTASSIUM mmol/L 4 1 3 8 4 3   CHLORIDE mmol/L 107 107 107   CO2 mmol/L 25 25 24   ANION GAP mmol/L 6 5 8   BUN mg/dL 22 15 18   CREATININE mg/dL 1 60* 1 49* 1 57*   EGFR ml/min/1 73sq m 35 38 36   CALCIUM mg/dL 8 4 7 9* 8 5   MAGNESIUM mg/dL 2 3 2 6 1 9   PHOSPHORUS mg/dL  --  2 9  --      Lab Units 05/07/23  2345   AST U/L 12*   ALT U/L 11   ALK PHOS U/L 61   TOTAL PROTEIN g/dL 7 1   ALBUMIN g/dL 3 8   TOTAL BILIRUBIN mg/dL 0 71         Lab Units 05/09/23  0436 05/08/23  0652 05/07/23  2345   GLUCOSE RANDOM mg/dL 90 172* 104           Lab Units 05/08/23  0331 05/08/23  0147 05/07/23  2345   HS TNI 0HR ng/L  --   --  50*   HS TNI 2HR ng/L  --  43  --    HSTNI D2 ng/L  --  -7  --    HS TNI 4HR ng/L 44  --   --    HSTNI D4 ng/L -6  --   --      Results from last 7 days   Lab Units 05/07/23  2348   D-DIMER QUANTITATIVE ug/ml FEU 0 61*           Results from last 7 days   Lab Units 05/08/23  0652   BNP pg/mL 1,328*           Results from last 7 days   Lab Units 05/07/23  2345   LIPASE u/L 66           Results from last 7 days   Lab Units 05/08/23  0228   CLARITY UA  Clear   COLOR UA  Yellow   SPEC GRAV UA  1 019   PH UA  6 0   GLUCOSE UA mg/dl Negative   KETONES UA mg/dl Negative   BLOOD UA  Negative   PROTEIN UA mg/dl Trace*   NITRITE UA  Negative   BILIRUBIN UA  Negative   UROBILINOGEN UA (BE) mg/dl 3 0*   LEUKOCYTES UA  Small*   WBC UA /hpf 2-4*   RBC UA /hpf 1-2   BACTERIA UA /hpf None Seen   EPITHELIAL CELLS WET PREP /hpf Occasional     Results from last 7 days   Lab Units 05/07/23  2348   INFLUENZA A PCR  Negative   INFLUENZA B PCR  Negative   RSV PCR  Negative         ED Treatment:   Medication Administration from 05/07/2023 2245 to 05/08/2023 0541       Date/Time Order Dose Route Action     05/07/2023 2348 EDT multi-electrolyte (ISOLYTE-S PH 7 4) bolus 500 mL 500 mL Intravenous New Bag     05/08/2023 0048 EDT amiodarone 150 mg in dextrose 5 % 100 mL IV bolus 150 mg Intravenous New Bag     05/08/2023 0112 EDT amiodarone (CORDARONE) 900 mg in dextrose 5 % 500 mL infusion 1 mg/min Intravenous New Bag     05/08/2023 0052 EDT acetaminophen (TYLENOL) tablet 650 mg 650 mg Oral Given     05/08/2023 0143 EDT metoclopramide (REGLAN) injection 10 mg 10 mg Intravenous Given     05/08/2023 0143 EDT diphenhydrAMINE (BENADRYL) injection 25 mg 25 mg Intravenous Given     05/08/2023 0224 EDT magnesium sulfate 2 g/50 mL IVPB (premix) 2 g 2 g Intravenous New Bag     05/08/2023 0233 EDT diltiazem (CARDIZEM) injection 10 mg 10 mg Intravenous Given     05/08/2023 0327 EDT diltiazem (CARDIZEM) injection 10 mg 10 mg Intravenous Given        Past Medical History:   Diagnosis Date   • ACS (acute coronary syndrome) ([place] [address] ) 2/7/2021   • Arrhythmia    • Arthritis    • Atrial fibrillation (HCC)    • Atrial fibrillation with rapid ventricular response ([facility] ) 3/20/2016   • Breast lump    • Chest pain 3/20/2016   • CKD (chronic kidney disease) stage 3, GFR 30-59 ml/min (HCC)    • Disease of thyroid gland    • Elevated troponin 9/9/2019   • 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 ([place] [address] ) 5/25/2020   • GERD (gastroesophageal reflux disease)    • H/O transfusion 1987   • Hepatitis C     resolved   • Hepatitis C    • Hyperlipidemia    • Hypertension    • Irregular heart beat    • Pacemaker    • Sleep apnea     no cpap   • Tachycardia      Present on Admission:  • Atrial flutter with RVR  • Gastroesophageal reflux disease   • CKD (chronic kidney disease) stage 3, GFR 30-59 ml/min (HCC)  • Anemia of chronic disease  • Primary hypertension  • Chronic heart failure with preserved ejection fraction (HFpEF) (HCC)  • Chronic hepatitis C without hepatic coma (HCC)  • Abnormal finding on CT scan      Admitting Diagnosis: Atrial flutter ([facility] ) [I48 92]  Chest pain [R07 9]  Atrial flutter with rapid ventricular response ([facility] ) [I48 92]  Age/Sex: 62 y o  female  Admission Orders:  Scheduled Medications:  acetaminophen, 975 mg, Oral, Q8H  amoxicillin, 500 mg, Oral, TID  diltiazem, 240 mg, Oral, Daily  doxazosin, 2 mg, Oral, HS  furosemide, 80 mg, Intravenous, BID (diuretic)  lisinopril, 20 mg, Oral, Daily  melatonin, 3 mg, Oral, HS  metoprolol succinate, 150 mg, Oral, BID  nicotine, 1 patch, Transdermal, Daily  nystatin, , Topical, BID  pantoprazole, 40 mg, Oral, Early Morning  rivaroxaban, 15 mg, Oral, QPM      Continuous IV Infusions:    amiodarone (CORDARONE) 900 mg in dextrose 5 % 500 mL infusion  Rate: 33 3 mL/hr Dose: 1 mg/min  Freq: Continuous Route: IV  Last Dose: 1 mg/min (05/08/23 0112)  Start: 05/08/23 0030 End: 05/08/23 0711     PRN Meds:  butalbital-acetaminophen-caffeine, 1 tablet, Oral, Q4H PRN  Diclofenac Sodium, 2 g, Topical, Q6H PRN  trimethobenzamide, 200 mg, Intramuscular, Q6H PRN        IP CONSULT TO CARDIOLOGY    Network Utilization Review Department  ATTENTION: Please call with any questions or concerns to [phone] and carefully listen to the prompts so that you are directed to the right person  All voicemails are confidential   Gregg White all requests for admission clinical reviews, approved or denied determinations and any other requests to dedicated fax number below belonging to the campus where the patient is receiving treatment   List of dedicated fax numbers for the Facilities:  [address] DENIALS (Administrative/Medical Necessity) [phone]   [address] (Maternity/NICU/Pediatrics) [phone]   9[address]ray [phone]   [facility] [phone]   [address] [place] [id number] [place] [address] 28 [phone]   [address] [facility] [place] [facility] 134 815 [facility] [phone]

## 2023-05-09 NOTE — PROGRESS NOTES
Cardiology Progress Note - Team 1  Sky Sorenson 62 y o  female MRN: [id number]  Unit/Bed#: S -01 Encounter: [id number]      Assessment/Plan:  1  Atypical atrial flutter  - s/p ablation (6/22/2022) by Dr Thomas Casey, successful cardioversion (4/6/2023)  - presenting ECG - likely atrial flutter, bifascicular block (known),   - s/p Cardizem 10 mg IV x2, amiodarone bolus + gtt  - telemetry review - A-fib, -120s  - echo (4/5/2023) - LVEF 65%, severe asymmetric hypertrophy of septal and apical walls (HCM), normal systolic function, RWMA cannot be excluded on the basis of the study, mild TR  - TSH (4/4/2023) 3 2, electrolytes WNL - maintain K > 4, mag > 2  - outpatient rate control regimen - Toprol- mg twice daily, Cardizem  mg daily; previously on amiodarone therapy, but discontinued by EP as it was thought that to be contributing to her atrial flutter with RVR  - [id number] - chronically anticoagulated on Xarelto 15 mg daily; continue  - device interrogation (5/7/2023) - in AT/AF 25 2% daily since 4/5/2023, longest episode of 7 days  - Cardizem increased to 240 mg daily this morning; continue + ToproL-XL  - discussed with EP - requesting transfer to [facility] for further management; SLIM aware  - continue to monitor on telemetry     2  Paroxysmal atrial fibrillation  - with history of PAF s/p cryo PVI (2020) by Dr Marii High  - presenting ECG/telemetry as noted above  - continue Troprol-XL, Cardizem CD for rate control, Xarelto for [facility]     3   Acute on chronic HFpEF  - weight on admission 241 lb per standing scale - 238 lb today; dry weight reported at 234 lb  - BNP 1328 (previously 2056 12/20/2022), CXR read pending  - CT A/P - mild pulmonary edema of lung bases, trace right pleural effusion  - outpatient diuretic regimen - Lasix 40 mg daily  - lasix 80 mg IV BID started yesterday (no output documented) - continue IV diuresis as weight is down trending + Toprol-XL for GDMT  - daily weights, I/Os, "salt/fluid restriction     4  History of VT s/p ICD  - MDT DC ICD (2016) by Dr Lorenzo Glez  - w/ shock due to atrial flutter with one-to-one conduction, no ventricular arrhythmias (4/4/2023)  - last in office device interrogation (2/10/2023) - 0 2% AF burden, longest episode of 50 minutes (1/17/2023)     5  [facility]  - echo as noted above  - previous EF of 30% (2021) - LVEF now recovered to 65%  - previously follows with HF outpatient, but not since 2021     6  Hypertension  - BP on arrival 113/65 - last documented at 127/86  - outpatient antihypertensive regimen - Cardura 2 mg daily, lisinopril 20 mg daily, Lasix 40 mg daily, Toprol-XL, Cardizem CD  - amlodipine discontinued last admission (4/4 to 4/6/2023)     7  Hyperlipidemia  - lipid panel (4/5/2023) - /triglycerides 83/HDL 40/LDL 68  - not maintained on statin therapy outpatient     8  CKD stage III  - creatinine on arrival 1 57 - 1 60 today  - baseline 1 6-1 8     9  Chronic normocytic anemia  - hemoglobin on arrival 9 9 - 9 1 today  - 11-12 as of last month - baseline 9-12     10  SURINDER  11  Tobacco abuse  12  History of cocaine use    Subjective:   Patient seen and examined  No significant events overnight  Patient complains of mild ABDUL  Denies any CP or palpitations  Notes good urine output  Discussed transfer to [facility] for EP evaluation and patient agreeable  Objective:   Vitals: Blood pressure 127/86, pulse 57, temperature 98 4 °F (36 9 °C), resp   rate 18, height 5' 7\" (1 702 m), weight 108 kg (238 lb 12 1 oz), SpO2 95 %, not currently breastfeeding , Body mass index is 37 39 kg/m² ,   Orthostatic Blood Pressures    Flowsheet Row Most Recent Value   Blood Pressure 127/86 filed at 05/09/2023 0836   Patient Position - Orthostatic VS Standing - Orthostatic VS filed at 05/08/2023 1935          Intake/Output Summary (Last 24 hours) at 5/9/2023 1013  Last data filed at 5/8/2023 2201  Gross per 24 hour   Intake 480 ml   Output 0 ml   Net 480 ml     Physical " Exam  Constitutional:       General: She is not in acute distress  Appearance: She is obese  She is not ill-appearing  HENT:      Head: Normocephalic and atraumatic  Nose: Nose normal       Mouth/Throat:      Mouth: Mucous membranes are moist       Pharynx: Oropharynx is clear  Eyes:      Pupils: Pupils are equal, round, and reactive to light  Neck:      Comments: + JVD  Cardiovascular:      Rate and Rhythm: Tachycardia present  Rhythm irregular  Pulses: Normal pulses  Heart sounds: Normal heart sounds  Pulmonary:      Effort: Pulmonary effort is normal  No respiratory distress  Breath sounds: No wheezing or rales  Comments: On RA  Abdominal:      General: Bowel sounds are normal  There is no distension  Palpations: Abdomen is soft  Musculoskeletal:         General: Normal range of motion  Cervical back: Normal range of motion  Right lower leg: No edema  Left lower leg: No edema  Skin:     General: Skin is warm and dry  Capillary Refill: Capillary refill takes less than 2 seconds  Neurological:      General: No focal deficit present  Mental Status: She is alert and oriented to person, place, and time     Psychiatric:         Mood and Affect: Mood normal          Behavior: Behavior normal      Medications:    Current Facility-Administered Medications:   •  acetaminophen (TYLENOL) tablet 975 mg, 975 mg, Oral, Q8H, Nory Herrera MD, 975 mg at 05/09/23 0510  •  amoxicillin (AMOXIL) capsule 500 mg, 500 mg, Oral, TID, Carlos Hoskins MD, 500 mg at 05/09/23 0510  •  butalbital-acetaminophen-caffeine (FIORICET,ESGIC) -40 mg per tablet 1 tablet, 1 tablet, Oral, Q4H PRN, Nory Herrera MD, 1 tablet at 05/09/23 3993  •  Diclofenac Sodium (VOLTAREN) 1 % topical gel 2 g, 2 g, Topical, Q6H PRN, Carlos Hoskins MD  •  diltiazem (CARDIZEM CD) 24 hr capsule 240 mg, 240 mg, Oral, Daily, CHARLOTTE Taylor, 240 mg at 05/09/23 9666  •  doxazosin (CARDURA) tablet 2 mg, 2 mg, Oral, HS, Jac Yu MD, 2 mg at 05/08/23 2057  •  furosemide (LASIX) injection 80 mg, 80 mg, Intravenous, BID (diuretic), CHARLOTTE Taylor, 80 mg at 05/09/23 0900  •  lisinopril (ZESTRIL) tablet 20 mg, 20 mg, Oral, Daily, Jac Yu MD, 20 mg at 05/09/23 6259  •  melatonin tablet 3 mg, 3 mg, Oral, HS, Jac Yu MD, 3 mg at 05/09/23 9818  •  metoprolol succinate (TOPROL-XL) 24 hr tablet 150 mg, 150 mg, Oral, BID, Jac Yu MD, 150 mg at 05/09/23 0909  •  nicotine (NICODERM CQ) 21 mg/24 hr TD 24 hr patch 1 patch, 1 patch, Transdermal, Daily, Jac Yu MD, 1 patch at 05/09/23 5399  •  nystatin (MYCOSTATIN) powder, , Topical, BID, Jac Yu MD, Given at 05/09/23 0911  •  pantoprazole (PROTONIX) EC tablet 40 mg, 40 mg, Oral, Early Morning, Jac Yu MD, 40 mg at 05/09/23 0510  •  rivaroxaban (XARELTO) tablet 15 mg, 15 mg, Oral, QPM, Jac Yu MD, 15 mg at 05/08/23 1730  •  trimethobenzamide (TIGAN) IM injection 200 mg, 200 mg, Intramuscular, Q6H PRN, Miriam Ling MD, 200 mg at 05/08/23 1809     Labs & Results:      Results from last 7 days   Lab Units 05/09/23  0436 05/08/23  0652 05/07/23  2345   WBC Thousand/uL 3 69* 3 89* 7 35   HEMOGLOBIN g/dL 9 1* 9 2* 9 9*   HEMATOCRIT % 30 1* 30 1* 32 3*   PLATELETS Thousands/uL 110* 109* 138*         Results from last 7 days   Lab Units 05/09/23  0436 05/08/23  0652 05/07/23  2345 05/06/23  1343   POTASSIUM mmol/L 4 1 3 8 4 3 4 0   CHLORIDE mmol/L 107 107 107 107   CO2 mmol/L 25 25 24 24   BUN mg/dL 22 15 18 22   CREATININE mg/dL 1 60* 1 49* 1 57* 1 77*   CALCIUM mg/dL 8 4 7 9* 8 5 9 0   ALK PHOS U/L  --   --  61 59   ALT U/L  --   --  11 15   AST U/L  --   --  12* 15         Results from last 7 days   Lab Units 05/09/23  0436 05/08/23  0652 05/07/23  2345   MAGNESIUM mg/dL 2 3 2 6 1 9

## 2023-05-09 NOTE — UTILIZATION REVIEW
NOTIFICATION OF INPATIENT ADMISSION   AUTHORIZATION REQUEST   SERVICING FACILITY:   [address] Dr Dennys Berg, [address] [place] [address]  Tax ID: [id number]  NPI: [id number]   ATTENDING PROVIDER:  Attending Name and NPI#: Harvey Kelly Md [[id number]]  Address: [address] [place] [address] Dr Dennys Berg, [address] [place] [address]  Phone: [phone]     ADMISSION INFORMATION:  Place of Service: Inpatient [address] [place] [address] Code: 21  Inpatient Admission Date/Time: 5/8/23  4:28 AM  Discharge Date/Time: 5/9/2023  3:20 PM  Admitting Diagnosis Code/Description:  Atrial flutter ([address] ) [I48 92]  Chest pain [R07 9]  Atrial flutter with rapid ventricular response ([address] ) [I48 92]     UTILIZATION REVIEW CONTACT:  Sarahy Pinzon Utilization   Network Utilization Review Department  Phone: [phone]  Fax: [phone]  Email: [email or web address]  Contact for approvals/pending authorizations, clinical reviews, and discharge  PHYSICIAN ADVISORY SERVICES:  Medical Necessity Denial & [id number] Review  Phone: [phone]  Fax: [phone]  Email: [email or web address]

## 2023-05-09 NOTE — ASSESSMENT & PLAN NOTE
EF of 65% last month  Evaluated by cardiology prior to transfer -> continue intravenous diuretic (Lasix) currently at 80 mg BID - on Toprol-XL for beta-blockade  Monitor/replete serum potassium/magnesium deficiencies if present  Low-sodium diet with fluid restriction enforced

## 2023-05-09 NOTE — PROGRESS NOTES
[facility]  Progress Note  Name: Veronica Nicole  MRN: [id number]  Unit/Bed#: S -01 I Date of Admission: 5/7/2023   Date of Service: 5/9/2023 I Hospital Day: 1    Assessment/Plan   * Atrial flutter with RVR  Assessment & Plan  POA: 62year-old female with Aflutter s/p cardioversion back to sinus rhythm on 4/6/23, chronic HFpEF, HTN, cocaine abuse, and remote h/o VT s/p ICD placement  presenting with palpitations and chest discomfort d/t recurrent Aflutter with RVR requiring amiodarone gtt with heart rate and symptoms improved and hemodynamically stable on admission  · Recently admitted due to symptomatic atypical Aflutter s/p cardioversion on 4/6/23 and discharged home on Toprol  mg twice daily and Cardizem (CD) 180 mg daily per Cardiology recs  [id number] 4 on anticoagulation therapy with Xarelto 15 mg daily  Pt reports adherence to treatment  · Mg and K within normal limits  Initial ECG showed sinus tachycardia (although likely atypical Aflutter with RVR) with  bpm  Received total Cardizem 20 mg IV and started on amiodarone gtt in the ED  Repeat ECG showing A flutter with variable AV block rate 104 and prolonged QTc 554  Trop x3 neg  Plan:  · Cardiology increase Cardizem to 240 and continue current metoprolol dose  · Diuresis per cardiology  · Continue Xarelto  · Cardiology also contacted EP and recommended transfer to [place]  Awaiting for placement    Abnormal finding on CT scan  Assessment & Plan  POA: Asymptomatic incidental findings on CT A/P w/o contrast as follows:  · Mild fat stranding about the pancreatic head and distal stomach/proximal duodenum, correlate with lipase for pancreatitis  Alternatively, findings may also be due to gastritis/duodenitis, correlate with endoscopy to evaluate for underlying peptic  No ulcer disease  (Note: No clinical evidence for pancreatitis with normal lipase and no epigastric pain on admission)     · 2 mm nonobstructive calculus in the right kidney  No hydronephrosis  · Diverticulosis without evidence of diverticulitis  Plan:  · Monitor s/s clinically while inpatient   · F/u with PCP for further evaluation/management as deemed appropriate    Anemia of chronic disease  Assessment & Plan  POA: Chronic normocytic anemia with hemoglobin 10 at baseline (9-12)  Likely anemia of CKD  Previous iron panel in 2021 consistent with anemia of chronic disease  Plan:  · Monitor Hgb with daily CBC    Chronic heart failure with preserved ejection fraction (HFpEF) ([facility] [address] )  Assessment & Plan  Wt Readings from Last 3 Encounters:   05/08/23 101 kg (222 lb)   05/06/23 101 kg (222 lb)   04/17/23 108 kg (237 lb)       Intake/Output Summary (Last 24 hours) at 5/8/2023 0626  Last data filed at 5/8/2023 0441  Gross per 24 hour   Intake 650 ml   Output 200 ml   Net 450 ml     POA: Chronic diastolic dysfunction, stable  Maintained on Lasix 40 mg daily  Patient reports adherent to treatment and low-sodium diet at home  Reports shortness of breath on presentation likely in the setting of uncontrolled Aflutter with RVR improved with HR control and placed on 2L O2 via NC with stable SpO2 in mid to high 90s  · 4/3/23 Echo showed severe asymmetric hypertrophy of the septal wall suggesting possible HCM with normal systolic function and LVEF 65%  No regional wall motion abnormality and unable to assess diastolic function due to A-fib  · CT abdomen pelvis without contrast showed mild pulmonary edema with trace right pleural effusion and race right lower lobe atelectasis   No overt signs of volume overload on exam     Plan:  · Monitor volume status and respiratory status  · Monitor strict I/O and daily wt with standing scale  · Continue mental O2 as needed, wean as able - maintain SpO2 > 88%  · Encourage incentive spirometry every 2 hours while awake  · Cardiac diet with 2g sodium and 1500 mL FR  · Diuresis per cardiology    Primary hypertension  Assessment & Plan  POA: Elevated BP  On lisinopril 20 mg daily, Cardizem 180 mg daily, and Lasix 40 mg daily  Plan:  · Monitor BP   · Continue home regimen  Increase Cardizem to 240 per cardiology     CKD (chronic kidney disease) stage 3, GFR 30-59 ml/min [facility])  Assessment & Plan  Lab Results   Component Value Date    EGFR 36 05/07/2023    EGFR 31 05/06/2023    EGFR 28 04/06/2023    CREATININE 1 57 (H) 05/07/2023    CREATININE 1 77 (H) 05/06/2023    CREATININE 1 89 (H) 04/06/2023     POA: Cr 1 6 at baseline (1 6-1 9)  Estimated Creatinine Clearance: 47 7 mL/min (A) (by C-G formula based on SCr of 1 57 mg/dL (H))  Plan:  · Monitor UOP and renal indices  · Avoid hypotension and nephrotoxins  · Renally dosed medications as appropriate    Chronic hepatitis C without hepatic coma (HCC)  Assessment & Plan  POA: Known h/o chronic hep C with mildly decrease AST 12 on admission  No RUQ pain  Liver and biliary tree unremarkable on CT A/P w/o contrast      Plan:  · Monitor s/s clinically  · Trend CMP as needed  · F/u with Outpatient GI/Hepatology     Gastroesophageal reflux disease   Assessment & Plan  POA: Chronic, stable  No epigastric pain, N/V  Plan:  · Continue home Protonix 40 mg daily                VTE Pharmacologic Prophylaxis: VTE Score: 3 Moderate Risk (Score 3-4) - Pharmacological DVT Prophylaxis Ordered: rivaroxaban (Xarelto)  Patient Centered Rounds: I performed bedside rounds with nursing staff today  Discussions with Specialists or Other Care Team Provider: Cardiology    Education and Discussions with Family / Patient: Patient declined call to   Current Length of Stay: 1 day(s)  Current Patient Status: Inpatient   Discharge Plan: Transfer to [facility] when bed is available    Code Status: Level 1 - Full Code    Subjective:   She reported feeling dizzy this morning otherwise did not offer complaints    She was eating drinking okay and ambulated to the bathroom herself  Objective:     Vitals:   Temp (24hrs), Av 7 °F (36 5 °C), Min:97 4 °F (36 3 °C), Max:98 4 °F (36 9 °C)    Temp:  [97 4 °F (36 3 °C)-98 4 °F (36 9 °C)] 97 4 °F (36 3 °C)  HR:  [51-96] 96  Resp:  [18] 18  BP: (116-167)/(63-90) 130/90  SpO2:  [93 %-95 %] 95 %  Body mass index is 37 39 kg/m²  Input and Output Summary (last 24 hours): Intake/Output Summary (Last 24 hours) at 2023 1423  Last data filed at 2023 1100  Gross per 24 hour   Intake 720 ml   Output 0 ml   Net 720 ml       Physical Exam:   Physical Exam  Vitals and nursing note reviewed  Constitutional:       General: She is not in acute distress  Appearance: She is obese  She is not ill-appearing or toxic-appearing  HENT:      Head: Normocephalic and atraumatic  Nose: Nose normal       Mouth/Throat:      Mouth: Mucous membranes are moist       Pharynx: Oropharynx is clear  Eyes:      Extraocular Movements: Extraocular movements intact  Pupils: Pupils are equal, round, and reactive to light  Cardiovascular:      Rate and Rhythm: Normal rate  Rhythm irregular  Pulses: Normal pulses  Heart sounds: Heart sounds are distant  No murmur heard  No gallop  Pulmonary:      Effort: Pulmonary effort is normal  No respiratory distress  Breath sounds: Decreased breath sounds present  No wheezing, rhonchi or rales  Abdominal:      General: Bowel sounds are normal  There is no distension  Palpations: Abdomen is soft  Tenderness: There is no abdominal tenderness  Musculoskeletal:         General: Normal range of motion  Cervical back: Normal range of motion and neck supple  Right lower leg: No edema  Left lower leg: No edema  Skin:     General: Skin is warm and dry  Findings: No lesion or rash  Neurological:      General: No focal deficit present  Mental Status: She is alert and oriented to person, place, and time  Mental status is at baseline     Psychiatric: Mood and Affect: Mood normal          Behavior: Behavior normal           Additional Data:     Labs:  Results from last 7 days   Lab Units 05/09/23  0436 05/08/23  0652 05/07/23  2345   WBC Thousand/uL 3 69*   < > 7 35   HEMOGLOBIN g/dL 9 1*   < > 9 9*   HEMATOCRIT % 30 1*   < > 32 3*   PLATELETS Thousands/uL 110*   < > 138*   NEUTROS PCT %  --   --  78*   LYMPHS PCT %  --   --  14   MONOS PCT %  --   --  8   EOS PCT %  --   --  0    < > = values in this interval not displayed  Results from last 7 days   Lab Units 05/09/23  0436 05/08/23  0652 05/07/23  2345   SODIUM mmol/L 138   < > 139   POTASSIUM mmol/L 4 1   < > 4 3   CHLORIDE mmol/L 107   < > 107   CO2 mmol/L 25   < > 24   BUN mg/dL 22   < > 18   CREATININE mg/dL 1 60*   < > 1 57*   ANION GAP mmol/L 6   < > 8   CALCIUM mg/dL 8 4   < > 8 5   ALBUMIN g/dL  --   --  3 8   TOTAL BILIRUBIN mg/dL  --   --  0 71   ALK PHOS U/L  --   --  61   ALT U/L  --   --  11   AST U/L  --   --  12*   GLUCOSE RANDOM mg/dL 90   < > 104    < > = values in this interval not displayed  Lines/Drains:  Invasive Devices     Peripheral Intravenous Line  Duration           Peripheral IV 05/07/23 Distal;Dorsal (posterior); Right Forearm 1 day    Peripheral IV 05/07/23 Dorsal (posterior); Left Forearm 1 day                  Telemetry:  Telemetry Orders (From admission, onward)             48 Hour Telemetry Monitoring  Continuous x 48 hours        References:    Telemetry Guidelines   Question:  Reason for 48 Hour Telemetry  Answer:  Arrhythmias Requiring Medical Therapy (eg  SVT, Vtach/fib, Bradycardia, Uncontrolled A-fib)                 Telemetry Reviewed: Atrial flutter  HR averaging 100  Indication for Continued Telemetry Use: Arrthymias requiring medical therapy             Imaging: No pertinent imaging reviewed      Recent Cultures (last 7 days):         Last 24 Hours Medication List:   Current Facility-Administered Medications   Medication Dose Route Frequency Provider Last Rate   • acetaminophen  975 mg Oral Q8H Nory Gregory MD     • amoxicillin  500 mg Oral TID Emilio Ellis MD     • butalbital-acetaminophen-caffeine  1 tablet Oral Q4H PRN Nory Gregory MD     • Diclofenac Sodium  2 g Topical Q6H PRN Emilio Ellis MD     • diltiazem  240 mg Oral Daily CHARLOTTE Taylor     • doxazosin  2 mg Oral HS Emilio Ellis MD     • furosemide  80 mg Intravenous BID (diuretic) Karyle Hitt, CRNP     • lisinopril  20 mg Oral Daily Emilio Ellis MD     • melatonin  3 mg Oral HS Emilio Ellis MD     • metoprolol succinate  150 mg Oral BID Emilio Ellis MD     • nicotine  1 patch Transdermal Daily Emilio Ellis MD     • nystatin   Topical BID Emilio Ellis MD     • pantoprazole  40 mg Oral Early Morning Emilio Ellis MD     • rivaroxaban  15 mg Oral QPM Emilio Ellis MD     • trimethobenzamide  200 mg Intramuscular Q6H PRN Francesco Keating MD          Today, Patient Was Seen By: Yoselin Lincoln MD      Tobacco and Toxic Substance Assessment and Intervention:     Tobacco use screening performed    Alcohol and drug use screening performed      **Please Note: This note may have been constructed using a voice recognition system  **

## 2023-05-09 NOTE — TRANSPORTATION MEDICAL NECESSITY
"Section I - General Information    Name of Patient: Ruthy Officer                 : 1965    Medicare #: [id number]  Transport Date: 23 (PCS is valid for round trips on this date and for all repetitive trips in the 60-day range as noted below )  Origin: [address] [place] [address] Road: [facility]  Is the pt's stay covered under Medicare Part A (PPS/DRG)   []     Closest appropriate facility? If no, why is transport to more distant facility required? Yes  If hospice pt, is this transport related to pt's terminal illness? NA       Section II - Medical Necessity Questionnaire  Ambulance transportation is medically necessary only if other means of transport are contraindicated or would be potentially harmful to the patient  To meet this requirement, the patient must either be \"bed confined\" or suffer from a condition such that transport by means other than ambulance is contraindicated by the patient's condition  The following questions must be answered by the medical professional signing below for this form to be valid:    1)  Describe the MEDICAL CONDITION (physical and/or mental) of this patient AT [address] [place] [address] that requires the patient to be transported in an ambulance and why transport by other means is contraindicated by the patient's condition: patient being transferred for EP evaluation    2) Is the patient \"bed confined\" as defined below? No  To be \"be confined\" the patient must satisfy all three of the following conditions: (1) unable to get up from bed without Assistance; AND (2) unable to ambulate; AND (3) unable to sit in a chair or wheelchair  3) Can this patient safely be transported by car or wheelchair van (i e , seated during transport without a medical attendant or monitoring)?    No    4) In addition to completing questions 1-3 above, please check any of the " following conditions that apply*:   *Note: supporting documentation for any boxes checked must be maintained in the patient's medical records  If hosp-hosp transfer, describe services needed at 2nd facility not available at 1st facility? Medical attendant required   Cardiac monitoring required en route       Section III - Signature of Physician or Healthcare Professional  I certify that the above information is true and correct based on my evaluation of this patient, and represent that the patient requires transport by ambulance and that other forms of transport are contraindicated  I understand that this information will be used by the Centers for Medicare and Medicaid Services (CMS) to support the determination of medical necessity for ambulance services, and I represent that I have personal knowledge of the patient's condition at time of transport  []  If this box is checked, I also certify that the patient is physically or mentally incapable of signing the ambulance service's claim and that the institution with which I am affiliated has furnished care, services, or assistance to the patient  My signature below is made on behalf of the patient pursuant to 42 CFR §424 36(b)(4)  In accordance with 42 CFR §424 37, the specific reason(s) that the patient is physically or mentally incapable of signing the claim form is as follows: Ladarius Urbina of Physician* or Healthcare Professional______________________________________________________________  Signature Date 05/09/23 (For scheduled repetitive transports, this form is not valid for transports performed more than 60 days after this date)    Printed Name & Credentials of Physician or Healthcare Professional (MD, DO, RN, etc )____PETER Georges____________________________  *Form must be signed by patient's attending physician for scheduled, repetitive transports   For non-repetitive, unscheduled ambulance transports, if unable to obtain the signature of the attending physician, any of the following may sign (choose appropriate option below)  [] Physician Assistant []  Clinical Nurse Specialist []  Registered Nurse  []  Nurse Practitioner  [x] Discharge Planner

## 2023-05-09 NOTE — LETTER
[address]  [address] [id number]  Dept: [phone]    May 11, 2023     Patient: Meet Norwood   YOB: 1965   Date of Visit: 5/11/2023       To Whom it May Concern:    Adam Becerra is under my professional care  She was seen in the hospital from 5/9/2023 to 05/11/23  She may return to work on 5/24/2023 without limitations  If you have any questions or concerns, please don't hesitate to call [phone]           Sincerely,          Yg Gerber PA-C

## 2023-05-09 NOTE — ASSESSMENT & PLAN NOTE
POA: 62year-old female with Aflutter s/p cardioversion back to sinus rhythm on 4/6/23, chronic HFpEF, HTN, cocaine abuse, and remote h/o VT s/p ICD placement  presenting with palpitations and chest discomfort d/t recurrent Aflutter with RVR requiring amiodarone gtt with heart rate and symptoms improved and hemodynamically stable on admission  · Recently admitted due to symptomatic atypical Aflutter s/p cardioversion on 4/6/23 and discharged home on Toprol  mg twice daily and Cardizem (CD) 180 mg daily per Cardiology recs  [id number] 4 on anticoagulation therapy with Xarelto 15 mg daily  Pt reports adherence to treatment  · Mg and K within normal limits  Initial ECG showed sinus tachycardia (although likely atypical Aflutter with RVR) with  bpm  Received total Cardizem 20 mg IV and started on amiodarone gtt in the ED  Repeat ECG showing A flutter with variable AV block rate 104 and prolonged QTc 554  Trop x3 neg  Plan:  · Cardiology increase Cardizem to 240 and continue current metoprolol dose  · Diuresis per cardiology  · Continue Xarelto  · Cardiology also contacted EP and recommended transfer to [facility] today

## 2023-05-09 NOTE — ASSESSMENT & PLAN NOTE
Currently rate controlled on Cardizem (dose increased prior to transfer by cardiology) and Toprol-XL -> was transiently placed on an Amiodarone infusion in the [facility] ED on arrival yesterday  On Xarelto for anticoagulation  Recent cardioversion last month and history of ablation in June 2022  Transferred to the [address] [place] [address] for electrophysiology evaluation/intervention  Monitor/replete electrolyte abnormalities if present  Telemetry monitoring

## 2023-05-09 NOTE — ASSESSMENT & PLAN NOTE
Lab Results   Component Value Date    EGFR 35 05/09/2023    EGFR 38 05/08/2023    EGFR 36 05/07/2023    CREATININE 1 60 (H) 05/09/2023    CREATININE 1 49 (H) 05/08/2023    CREATININE 1 57 (H) 05/07/2023     POA: Cr 1 6 at baseline (1 6-1 9)  Estimated Creatinine Clearance: 48 5 mL/min (A) (by C-G formula based on SCr of 1 6 mg/dL (H))      Plan:  · Monitor UOP and renal indices  · Avoid hypotension and nephrotoxins  · Renally dosed medications as appropriate

## 2023-05-09 NOTE — ASSESSMENT & PLAN NOTE
Lab Results   Component Value Date    EGFR 35 05/09/2023    EGFR 38 05/08/2023    EGFR 36 05/07/2023    CREATININE 1 60 (H) 05/09/2023    CREATININE 1 49 (H) 05/08/2023    CREATININE 1 57 (H) 05/07/2023     Baseline creatinine of approximately 1 6-1 7 -> currently stable at 1 6  Monitor renal function and urine output -> limit/avoid nephrotoxins and hypotension as possible -> note Lasix/Zestril use

## 2023-05-09 NOTE — RESPIRATORY THERAPY NOTE
RT Protocol Note  Cristi Fernandes 62 y o  female MRN: [id number]  Unit/Bed#: -01 Encounter: [id number]    Assessment    Active Problems:    * No active hospital problems   *      Home Pulmonary Medications:  none       Past Medical History:   Diagnosis Date    ACS (acute coronary syndrome) ([facility] ) 2/7/2021    Arrhythmia     Arthritis     Atrial fibrillation (HCC)     Atrial fibrillation with rapid ventricular response ([place] [address] ) 3/20/2016    Breast lump     Chest pain 3/20/2016    CKD (chronic kidney disease) stage 3, GFR 30-59 ml/min (HCC)     Disease of thyroid gland     Elevated troponin 9/9/2019    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 ([place] [address] ) 5/25/2020    GERD (gastroesophageal reflux disease)     H/O transfusion 1987    Hepatitis C     resolved    Hepatitis C     Hyperlipidemia     Hypertension     Irregular heart beat     Pacemaker     Sleep apnea     no cpap    Tachycardia      Social History     Socioeconomic History    Marital status: /Civil Union     Spouse name: Not on file    Number of children: Not on file    Years of education: 12    Highest education level: Not on file   Occupational History    Not on file   Tobacco Use    Smoking status: Every Day     Packs/day: 0 50     Years: 35 00     Pack years: 17 50     Types: Cigarettes    Smokeless tobacco: Never   Vaping Use    Vaping Use: Never used   Substance and Sexual Activity    Alcohol use: Not Currently     Comment: occassionally    Drug use: Yes     Types: Marijuana     Comment: last used: 05/01/23    Sexual activity: Yes     Partners: Male     Birth control/protection: None   Other Topics Concern    Not on file   Social History Narrative    Disabled        · Do you currently or have you served in [employer]:   No      · Were you activated, into active duty, as a member of the [employer] or as a Reservist:   No      · Diet:   Regular      · General stress level:   High      · Has smoked since age:   12 · Caffeine intake: Moderate      · Guns present in home:   No      · Seat belts used routinely:   Yes      · Sunscreen used routinely:   No      · Advance directive:   No      · Live alone or with others:   with others      · International travel:   no      · Pets:   No      · Blind or serious difficulty seeing: Yes     · Difficulty concentrating, remembering or making decisions:   No      · Difficulty walking or climbing stairs: Yes      · Difficulty dressing or bathing:   No      · Difficulty doing errands alone:   No      · What is the highest grade or level of school you have completed or the highest degree you have received:   12th grade, no diploma      · How many days of moderate to strenuous exercise, like a brisk walk, did you do in the last 7 days:   0      · How hard is it for you to pay for the very basics like food, housing, medical care, and heating:   Somewhat hard      · Do you feel stress - tense, restless, nervous, or anxious, or unable to sleep at night because your mind is troubled all the time - these days: Only a little          Social Determinants of Health     Financial Resource Strain: Not on file   Food Insecurity: No Food Insecurity    Worried About [facility] in the Last Year: Never true    Ran Out of Food in the Last Year: Never true   Transportation Needs: No Transportation Needs    Lack of Transportation (Medical): No    Lack of Transportation (Non-Medical):  No   Physical Activity: Not on file   Stress: Not on file   Social Connections: Not on file   Intimate Partner Violence: Not on file   Housing Stability: Low Risk     Unable to Pay for Housing in the Last Year: No    Number of Places Lived in the Last Year: 1    Unstable Housing in the Last Year: No       Subjective         Objective    Physical Exam:   Assessment Type: Assess only  General Appearance: Alert, Awake  Respiratory Pattern: Normal  Chest Assessment: Chest expansion symmetrical  Bilateral Breath "Sounds: Clear, Diminished    Vitals:  Blood pressure 126/85, pulse 57, temperature 98 3 °F (36 8 °C), temperature source Oral, resp  rate 16, height 5' 7\" (1 702 m), weight 108 kg (237 lb 7 4 oz), SpO2 95 %, not currently breastfeeding  Imaging and other studies: I have personally reviewed pertinent reports  Plan    Respiratory Plan: Discontinue Protocol        Resp Comments: Pt assessed at this time per resp protocol  Per pt and chart pt has no pulmonary HX and takes no resp medications at home  No resp distress noted SpO2 WNL on RA  At this time no resp interventions indicated resp protocol will be D/C     "

## 2023-05-09 NOTE — ASSESSMENT & PLAN NOTE
Wt Readings from Last 3 Encounters:   05/09/23 108 kg (238 lb 12 1 oz)   05/06/23 101 kg (222 lb)   04/17/23 108 kg (237 lb)       Intake/Output Summary (Last 24 hours) at 5/9/2023 1455  Last data filed at 5/9/2023 1100  Gross per 24 hour   Intake 720 ml   Output 0 ml   Net 720 ml     POA: Chronic diastolic dysfunction, stable  Maintained on Lasix 40 mg daily  Patient reports adherent to treatment and low-sodium diet at home  Reports shortness of breath on presentation likely in the setting of uncontrolled Aflutter with RVR improved with HR control and placed on 2L O2 via NC with stable SpO2 in mid to high 90s  · 4/3/23 Echo showed severe asymmetric hypertrophy of the septal wall suggesting possible HCM with normal systolic function and LVEF 65%  No regional wall motion abnormality and unable to assess diastolic function due to A-fib  · CT abdomen pelvis without contrast showed mild pulmonary edema with trace right pleural effusion and race right lower lobe atelectasis   No overt signs of volume overload on exam     Plan:  · Monitor volume status and respiratory status  · Monitor strict I/O and daily wt with standing scale  · Continue mental O2 as needed, wean as able - maintain SpO2 > 88%  · Encourage incentive spirometry every 2 hours while awake  · Cardiac diet with 2g sodium and 1500 mL FR  · Diuresis per cardiology

## 2023-05-09 NOTE — DISCHARGE SUMMARY
[facility]  Discharge- Antonio Salinas 1965, 62 y o  female MRN: [id number]  Unit/Bed#: S -01 Encounter: [id number]  Primary Care Provider: Silvana Sarmiento MD   Date and time admitted to hospital: 5/7/2023 11:29 PM    * Atrial flutter with RVR  Assessment & Plan  POA: 62year-old female with Aflutter s/p cardioversion back to sinus rhythm on 4/6/23, chronic HFpEF, HTN, cocaine abuse, and remote h/o VT s/p ICD placement  presenting with palpitations and chest discomfort d/t recurrent Aflutter with RVR requiring amiodarone gtt with heart rate and symptoms improved and hemodynamically stable on admission  · Recently admitted due to symptomatic atypical Aflutter s/p cardioversion on 4/6/23 and discharged home on Toprol  mg twice daily and Cardizem (CD) 180 mg daily per Cardiology recs  [id number] 4 on anticoagulation therapy with Xarelto 15 mg daily  Pt reports adherence to treatment  · Mg and K within normal limits  Initial ECG showed sinus tachycardia (although likely atypical Aflutter with RVR) with  bpm  Received total Cardizem 20 mg IV and started on amiodarone gtt in the ED  Repeat ECG showing A flutter with variable AV block rate 104 and prolonged QTc 554  Trop x3 neg  Plan:  · Cardiology increase Cardizem to 240 and continue current metoprolol dose  · Diuresis per cardiology  · Continue Xarelto  · Cardiology also contacted EP and recommended transfer to [facility] today    Abnormal finding on CT scan  Assessment & Plan  POA: Asymptomatic incidental findings on CT A/P w/o contrast as follows:  · Mild fat stranding about the pancreatic head and distal stomach/proximal duodenum, correlate with lipase for pancreatitis  Alternatively, findings may also be due to gastritis/duodenitis, correlate with endoscopy to evaluate for underlying peptic  No ulcer disease   (Note: No clinical evidence for pancreatitis with normal lipase and no epigastric pain on admission)  · 2 mm nonobstructive calculus in the right kidney  No hydronephrosis  · Diverticulosis without evidence of diverticulitis  Plan:  · Monitor s/s clinically while inpatient   · F/u with PCP for further evaluation/management as deemed appropriate    Anemia of chronic disease  Assessment & Plan  POA: Chronic normocytic anemia with hemoglobin 10 at baseline (9-12)  Likely anemia of CKD  Previous iron panel in 2021 consistent with anemia of chronic disease  Plan:  · Monitor Hgb with daily CBC    Chronic heart failure with preserved ejection fraction (HFpEF) ([facility] [address] )  Assessment & Plan  Wt Readings from Last 3 Encounters:   05/09/23 108 kg (238 lb 12 1 oz)   05/06/23 101 kg (222 lb)   04/17/23 108 kg (237 lb)       Intake/Output Summary (Last 24 hours) at 5/9/2023 1455  Last data filed at 5/9/2023 1100  Gross per 24 hour   Intake 720 ml   Output 0 ml   Net 720 ml     POA: Chronic diastolic dysfunction, stable  Maintained on Lasix 40 mg daily  Patient reports adherent to treatment and low-sodium diet at home  Reports shortness of breath on presentation likely in the setting of uncontrolled Aflutter with RVR improved with HR control and placed on 2L O2 via NC with stable SpO2 in mid to high 90s  · 4/3/23 Echo showed severe asymmetric hypertrophy of the septal wall suggesting possible HCM with normal systolic function and LVEF 65%  No regional wall motion abnormality and unable to assess diastolic function due to A-fib  · CT abdomen pelvis without contrast showed mild pulmonary edema with trace right pleural effusion and race right lower lobe atelectasis   No overt signs of volume overload on exam     Plan:  · Monitor volume status and respiratory status  · Monitor strict I/O and daily wt with standing scale  · Continue mental O2 as needed, wean as able - maintain SpO2 > 88%  · Encourage incentive spirometry every 2 hours while awake  · Cardiac diet with 2g sodium and 1500 mL FR  · Diuresis per cardiology    Primary hypertension  Assessment & Plan  POA: Elevated BP  On lisinopril 20 mg daily, Cardizem 180 mg daily, and Lasix 40 mg daily  Plan:  · Monitor BP   · Continue home regimen  Increase Cardizem to 240 per cardiology     CKD (chronic kidney disease) stage 3, GFR 30-59 ml/min [facility])  Assessment & Plan  Lab Results   Component Value Date    EGFR 35 05/09/2023    EGFR 38 05/08/2023    EGFR 36 05/07/2023    CREATININE 1 60 (H) 05/09/2023    CREATININE 1 49 (H) 05/08/2023    CREATININE 1 57 (H) 05/07/2023     POA: Cr 1 6 at baseline (1 6-1 9)  Estimated Creatinine Clearance: 48 5 mL/min (A) (by C-G formula based on SCr of 1 6 mg/dL (H))  Plan:  · Monitor UOP and renal indices  · Avoid hypotension and nephrotoxins  · Renally dosed medications as appropriate    Chronic hepatitis C without hepatic coma (HCC)  Assessment & Plan  POA: Known h/o chronic hep C with mildly decrease AST 12 on admission  No RUQ pain  Liver and biliary tree unremarkable on CT A/P w/o contrast      Plan:  · Monitor s/s clinically  · Trend CMP as needed  · F/u with Outpatient GI/Hepatology     Gastroesophageal reflux disease   Assessment & Plan  POA: Chronic, stable  No epigastric pain, N/V  Plan:  · Continue home Protonix 40 mg daily       Medical Problems     Resolved Problems  Date Reviewed: 5/9/2023   None       Discharging Resident: Gurinder Joy MD  Discharging Attending: Elinor Arriaga MD  PCP: Xavier Fowler MD  Admission Date:   Admission Orders (From admission, onward)     Ordered        05/08/23 0428  INPATIENT ADMISSION  Once                      Discharge Date: 05/09/23    Consultations During Hospital Stay:  · Cardiology    Procedures Performed:   · none    Significant Findings / Test Results:   CT abdomen pelvis wo contrast    Result Date: 5/8/2023  Impression: 1  Mild fat stranding about the pancreatic head and distal stomach/proximal duodenum, correlate with lipase for pancreatitis  "Alternatively, findings may also be due to gastritis/duodenitis, correlate with endoscopy to evaluate for underlying peptic ulcer disease  2   Mild pulmonary edema  Trace right pleural effusion  Trace right lower lobe atelectasis  3   2 mm nonobstructive calculus in the right kidney  No hydronephrosis  4   Diverticulosis without evidence of diverticulitis  Workstation performed: [id number]       · No Chest XR results available for this patient  Incidental Findings:   · As noted above  · I reviewed the above mentioned incidental findings with the patient and/or family and they expressed understanding  Test Results Pending at Discharge (will require follow up):  · none     Outpatient Tests Requested:  · None    Complications: None    Reason for Admission: [facility] Course:   Antonio Salinas is a 62 y o  female patient who originally presented to the hospital on 5/7/2023 due to palpitation and found to have atrial flutter and RVR in the ED  she was started with amiodarone drip and was subsequently discontinued by cardiology  Cardiology increase the Cardizem from 1 80-2 40 and started diuresis for this patient  Due to her complex atypical atrial flutter history, EP decided that it would be appropriate for her to be transferred to [facility] for further evaluation and treatment  Please see above list of diagnoses and related plan for additional information  Condition at Discharge: stable    Discharge Day Visit / Exam:   Subjective:  She reported feeling dizzy this morning otherwise did not offer complaints  She was eating drinking okay and ambulated to the bathroom herself    Vitals: Blood Pressure: 130/90 (05/09/23 1054)  Pulse: 96 (05/09/23 1054)  Temperature: (!) 97 4 °F (36 3 °C) (05/09/23 1054)  Temp Source: Oral (05/09/23 1054)  Respirations: 18 (05/08/23 2230)  Height: 5' 7\" (170 2 cm) (05/08/23 0315)  Weight - Scale: 108 kg (238 lb 12 1 oz) (05/09/23 0600)  SpO2: 95 % (05/09/23 " 1054)  Exam:   Physical Exam  Vitals and nursing note reviewed  Constitutional:       General: She is not in acute distress  Appearance: She is obese  She is not ill-appearing or toxic-appearing  HENT:      Head: Normocephalic and atraumatic  Nose: Nose normal       Mouth/Throat:      Mouth: Mucous membranes are moist       Pharynx: Oropharynx is clear  Eyes:      Extraocular Movements: Extraocular movements intact  Pupils: Pupils are equal, round, and reactive to light  Cardiovascular:      Rate and Rhythm: Normal rate  Rhythm irregular  Pulses: Normal pulses  Heart sounds: Heart sounds are distant  No murmur heard  No gallop  Pulmonary:      Effort: Pulmonary effort is normal  No respiratory distress  Breath sounds: Decreased breath sounds present  No wheezing, rhonchi or rales  Abdominal:      General: Bowel sounds are normal  There is no distension  Palpations: Abdomen is soft  Tenderness: There is no abdominal tenderness  Musculoskeletal:         General: Normal range of motion  Cervical back: Normal range of motion and neck supple  Right lower leg: No edema  Left lower leg: No edema  Skin:     General: Skin is warm and dry  Findings: No lesion or rash  Neurological:      General: No focal deficit present  Mental Status: She is alert and oriented to person, place, and time  Mental status is at baseline  Psychiatric:         Mood and Affect: Mood normal          Behavior: Behavior normal           Discussion with Family: Patient declined call to   Discharge instructions/Information to patient and family:   See after visit summary for information provided to patient and family  Provisions for Follow-Up Care:  See after visit summary for information related to follow-up care and any pertinent home health orders         Disposition:   [address] Transfer to [facility]    Planned Readmission: NONE    Discharge Medications:  See after visit summary for reconciled discharge medications provided to patient and/or family        **Please Note: This note may have been constructed using a voice recognition system**      Tobacco and Toxic Substance Assessment and Intervention:     Tobacco use screening performed    Alcohol and drug use screening performed

## 2023-05-10 ENCOUNTER — ANESTHESIA (INPATIENT)
Dept: NON INVASIVE DIAGNOSTICS | Facility: HOSPITAL | Age: 58
End: 2023-05-10

## 2023-05-10 ENCOUNTER — ANESTHESIA EVENT (INPATIENT)
Dept: NON INVASIVE DIAGNOSTICS | Facility: HOSPITAL | Age: 58
End: 2023-05-10

## 2023-05-10 LAB
ANION GAP SERPL CALCULATED.3IONS-SCNC: 1 MMOL/L (ref 4–13)
ATRIAL RATE: 264 BPM
BUN SERPL-MCNC: 25 MG/DL (ref 5–25)
CALCIUM SERPL-MCNC: 8.5 MG/DL (ref 8.3–10.1)
CHLORIDE SERPL-SCNC: 109 MMOL/L (ref 96–108)
CO2 SERPL-SCNC: 28 MMOL/L (ref 21–32)
CREAT SERPL-MCNC: 1.98 MG/DL (ref 0.6–1.3)
ERYTHROCYTE [DISTWIDTH] IN BLOOD BY AUTOMATED COUNT: 15.5 % (ref 11.6–15.1)
GFR SERPL CREATININE-BSD FRML MDRD: 27 ML/MIN/1.73SQ M
GLUCOSE SERPL-MCNC: 94 MG/DL (ref 65–140)
HCT VFR BLD AUTO: 30 % (ref 34.8–46.1)
HGB BLD-MCNC: 9.3 G/DL (ref 11.5–15.4)
MAGNESIUM SERPL-MCNC: 2.6 MG/DL (ref 1.6–2.6)
MCH RBC QN AUTO: 25.8 PG (ref 26.8–34.3)
MCHC RBC AUTO-ENTMCNC: 31 G/DL (ref 31.4–37.4)
MCV RBC AUTO: 83 FL (ref 82–98)
P AXIS: 115 DEGREES
PLATELET # BLD AUTO: 142 THOUSANDS/UL (ref 149–390)
PMV BLD AUTO: 12.7 FL (ref 8.9–12.7)
POTASSIUM SERPL-SCNC: 4.2 MMOL/L (ref 3.5–5.3)
QRS AXIS: -54 DEGREES
QRSD INTERVAL: 168 MS
QT INTERVAL: 420 MS
QTC INTERVAL: 557 MS
RBC # BLD AUTO: 3.61 MILLION/UL (ref 3.81–5.12)
SODIUM SERPL-SCNC: 138 MMOL/L (ref 135–147)
T WAVE AXIS: 112 DEGREES
VENTRICULAR RATE: 106 BPM
WBC # BLD AUTO: 3.28 THOUSAND/UL (ref 4.31–10.16)

## 2023-05-10 PROCEDURE — 02583ZZ DESTRUCTION OF CONDUCTION MECHANISM, PERCUTANEOUS APPROACH: ICD-10-PCS | Performed by: HOSPITALIST

## 2023-05-10 RX ORDER — LIDOCAINE 50 MG/G
1 PATCH TOPICAL DAILY
Status: DISCONTINUED | OUTPATIENT
Start: 2023-05-10 | End: 2023-05-12 | Stop reason: HOSPADM

## 2023-05-10 RX ORDER — PROPOFOL 10 MG/ML
INJECTION, EMULSION INTRAVENOUS CONTINUOUS PRN
Status: DISCONTINUED | OUTPATIENT
Start: 2023-05-10 | End: 2023-05-10

## 2023-05-10 RX ORDER — PROPOFOL 10 MG/ML
INJECTION, EMULSION INTRAVENOUS AS NEEDED
Status: DISCONTINUED | OUTPATIENT
Start: 2023-05-10 | End: 2023-05-10

## 2023-05-10 RX ORDER — OXYCODONE HYDROCHLORIDE 5 MG/1
5 TABLET ORAL EVERY 6 HOURS PRN
Status: DISCONTINUED | OUTPATIENT
Start: 2023-05-10 | End: 2023-05-12 | Stop reason: HOSPADM

## 2023-05-10 RX ORDER — METOPROLOL TARTRATE 5 MG/5ML
5 INJECTION INTRAVENOUS ONCE
Status: COMPLETED | OUTPATIENT
Start: 2023-05-10 | End: 2023-05-10

## 2023-05-10 RX ORDER — CEFAZOLIN SODIUM 2 G/50ML
2000 SOLUTION INTRAVENOUS ONCE
Status: COMPLETED | OUTPATIENT
Start: 2023-05-10 | End: 2023-05-10

## 2023-05-10 RX ORDER — DILTIAZEM HYDROCHLORIDE 120 MG/1
120 CAPSULE, COATED, EXTENDED RELEASE ORAL DAILY
Status: DISCONTINUED | OUTPATIENT
Start: 2023-05-11 | End: 2023-05-11

## 2023-05-10 RX ORDER — SODIUM CHLORIDE 9 MG/ML
INJECTION, SOLUTION INTRAVENOUS CONTINUOUS PRN
Status: DISCONTINUED | OUTPATIENT
Start: 2023-05-10 | End: 2023-05-10

## 2023-05-10 RX ORDER — MIDAZOLAM HYDROCHLORIDE 2 MG/2ML
INJECTION, SOLUTION INTRAMUSCULAR; INTRAVENOUS AS NEEDED
Status: DISCONTINUED | OUTPATIENT
Start: 2023-05-10 | End: 2023-05-10

## 2023-05-10 RX ORDER — LANOLIN ALCOHOL/MO/W.PET/CERES
6 CREAM (GRAM) TOPICAL
Status: DISCONTINUED | OUTPATIENT
Start: 2023-05-10 | End: 2023-05-12 | Stop reason: HOSPADM

## 2023-05-10 RX ORDER — LIDOCAINE HYDROCHLORIDE 10 MG/ML
INJECTION, SOLUTION EPIDURAL; INFILTRATION; INTRACAUDAL; PERINEURAL CODE/TRAUMA/SEDATION MEDICATION
Status: DISCONTINUED | OUTPATIENT
Start: 2023-05-10 | End: 2023-05-10 | Stop reason: HOSPADM

## 2023-05-10 RX ORDER — METOPROLOL SUCCINATE 50 MG/1
50 TABLET, EXTENDED RELEASE ORAL 2 TIMES DAILY
Status: DISCONTINUED | OUTPATIENT
Start: 2023-05-10 | End: 2023-05-11

## 2023-05-10 RX ADMIN — FUROSEMIDE 80 MG: 10 INJECTION, SOLUTION INTRAMUSCULAR; INTRAVENOUS at 09:09

## 2023-05-10 RX ADMIN — NYSTATIN: 100000 POWDER TOPICAL at 09:10

## 2023-05-10 RX ADMIN — AMOXICILLIN 500 MG: 500 CAPSULE ORAL at 14:33

## 2023-05-10 RX ADMIN — NYSTATIN: 100000 POWDER TOPICAL at 18:55

## 2023-05-10 RX ADMIN — PROPOFOL 20 MG: 10 INJECTION, EMULSION INTRAVENOUS at 15:41

## 2023-05-10 RX ADMIN — FUROSEMIDE 80 MG: 10 INJECTION, SOLUTION INTRAMUSCULAR; INTRAVENOUS at 18:56

## 2023-05-10 RX ADMIN — MAGNESIUM SULFATE HEPTAHYDRATE 2 G: 40 INJECTION, SOLUTION INTRAVENOUS at 09:09

## 2023-05-10 RX ADMIN — OXYCODONE HYDROCHLORIDE 5 MG: 5 TABLET ORAL at 18:55

## 2023-05-10 RX ADMIN — METOPROLOL SUCCINATE 50 MG: 50 TABLET, EXTENDED RELEASE ORAL at 22:40

## 2023-05-10 RX ADMIN — METOPROLOL SUCCINATE 150 MG: 100 TABLET, EXTENDED RELEASE ORAL at 09:09

## 2023-05-10 RX ADMIN — BUTALBITAL, ACETAMINOPHEN, AND CAFFEINE 1 TABLET: 50; 325; 40 TABLET ORAL at 12:54

## 2023-05-10 RX ADMIN — CEFAZOLIN SODIUM 2000 MG: 2 SOLUTION INTRAVENOUS at 15:19

## 2023-05-10 RX ADMIN — AMOXICILLIN 500 MG: 500 CAPSULE ORAL at 05:12

## 2023-05-10 RX ADMIN — DOXAZOSIN 2 MG: 2 TABLET ORAL at 22:12

## 2023-05-10 RX ADMIN — ACETAMINOPHEN 975 MG: 325 TABLET ORAL at 14:33

## 2023-05-10 RX ADMIN — ACETAMINOPHEN 975 MG: 325 TABLET ORAL at 05:11

## 2023-05-10 RX ADMIN — METOCLOPRAMIDE HYDROCHLORIDE 10 MG: 5 INJECTION INTRAMUSCULAR; INTRAVENOUS at 05:11

## 2023-05-10 RX ADMIN — METOROPROLOL TARTRATE 5 MG: 5 INJECTION, SOLUTION INTRAVENOUS at 11:22

## 2023-05-10 RX ADMIN — AMOXICILLIN 500 MG: 500 CAPSULE ORAL at 22:16

## 2023-05-10 RX ADMIN — DILTIAZEM HYDROCHLORIDE 240 MG: 240 CAPSULE, COATED, EXTENDED RELEASE ORAL at 09:09

## 2023-05-10 RX ADMIN — SODIUM CHLORIDE: 0.9 INJECTION, SOLUTION INTRAVENOUS at 15:12

## 2023-05-10 RX ADMIN — NICOTINE 1 PATCH: 21 PATCH, EXTENDED RELEASE TRANSDERMAL at 09:09

## 2023-05-10 RX ADMIN — LISINOPRIL 20 MG: 20 TABLET ORAL at 09:09

## 2023-05-10 RX ADMIN — ACETAMINOPHEN 650 MG: 325 TABLET ORAL at 22:10

## 2023-05-10 RX ADMIN — LIDOCAINE 5% 1 PATCH: 700 PATCH TOPICAL at 17:30

## 2023-05-10 RX ADMIN — METOCLOPRAMIDE HYDROCHLORIDE 10 MG: 5 INJECTION INTRAMUSCULAR; INTRAVENOUS at 14:33

## 2023-05-10 RX ADMIN — MIDAZOLAM 2 MG: 1 INJECTION INTRAMUSCULAR; INTRAVENOUS at 15:14

## 2023-05-10 RX ADMIN — RIVAROXABAN 15 MG: 15 TABLET, FILM COATED ORAL at 18:55

## 2023-05-10 RX ADMIN — PANTOPRAZOLE SODIUM 40 MG: 40 TABLET, DELAYED RELEASE ORAL at 05:11

## 2023-05-10 RX ADMIN — PROPOFOL 50 MCG/KG/MIN: 10 INJECTION, EMULSION INTRAVENOUS at 15:18

## 2023-05-10 RX ADMIN — MELATONIN 6 MG: at 22:12

## 2023-05-10 NOTE — ASSESSMENT & PLAN NOTE
Lab Results   Component Value Date    EGFR 27 05/10/2023    EGFR 35 05/09/2023    EGFR 38 05/08/2023    CREATININE 1 98 (H) 05/10/2023    CREATININE 1 60 (H) 05/09/2023    CREATININE 1 49 (H) 05/08/2023     Baseline creatinine of approximately 1 6-1 7 -> currently Cr 1 98, if worsens further will c/s renal  Monitor renal function and urine output -> limit/avoid nephrotoxins and hypotension as possible -> note Lasix/Zestril use

## 2023-05-10 NOTE — ANESTHESIA POSTPROCEDURE EVALUATION
Post-Op Assessment Note    CV Status:  Stable  Pain Score: 0    Pain management: adequate     Mental Status:  Awake   Hydration Status:  Stable   PONV Controlled:  None   Airway Patency:  Patent      Post Op Vitals Reviewed: Yes      Staff: CRNA         There were no known notable events for this encounter      BP   110/870   Temp      Pulse    Resp   20   SpO2   99%

## 2023-05-10 NOTE — PLAN OF CARE
Problem: PAIN - ADULT  Goal: Verbalizes/displays adequate comfort level or baseline comfort level  Description: Interventions:  - Encourage patient to monitor pain and request assistance  - Assess pain using appropriate pain scale  - Administer analgesics based on type and severity of pain and evaluate response  - Implement non-pharmacological measures as appropriate and evaluate response  - Consider cultural and social influences on pain and pain management  - Notify physician/advanced practitioner if interventions unsuccessful or patient reports new pain  Outcome: Progressing     Problem: INFECTION - ADULT  Goal: Absence or prevention of progression during hospitalization  Description: INTERVENTIONS:  - Assess and monitor for signs and symptoms of infection  - Monitor lab/diagnostic results  - Monitor all insertion sites, i e  indwelling lines, tubes, and drains  - Monitor endotracheal if appropriate and nasal secretions for changes in amount and color  - [place] appropriate cooling/warming therapies per order  - Administer medications as ordered  - Instruct and encourage patient and family to use good hand hygiene technique  - Identify and instruct in appropriate isolation precautions for identified infection/condition  Outcome: Progressing     Problem: SAFETY ADULT  Goal: Patient will remain free of falls  Description: INTERVENTIONS:  - Educate patient/family on patient safety including physical limitations  - Instruct patient to call for assistance with activity   - Consult OT/PT to assist with strengthening/mobility   - Keep Call bell within reach  - Keep bed low and locked with side rails adjusted as appropriate  - Keep care items and personal belongings within reach  - Initiate and maintain comfort rounds  - Make Fall Risk Sign visible to staff  - Apply yellow socks and bracelet for high fall risk patients  - Consider moving patient to room near nurses station  Outcome: Progressing     Problem: CARDIOVASCULAR - ADULT  Goal: Maintains optimal cardiac output and hemodynamic stability  Description: INTERVENTIONS:  - Monitor I/O, vital signs and rhythm  - Monitor for S/S and trends of decreased cardiac output  - Administer and titrate ordered vasoactive medications to optimize hemodynamic stability  - Assess quality of pulses, skin color and temperature  - Assess for signs of decreased coronary artery perfusion  - Instruct patient to report change in severity of symptoms  Outcome: Progressing

## 2023-05-10 NOTE — CONSULTS
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 pericardial effusion  The pericardium was normal in appearance  AORTA: The root exhibited normal size  SYSTEMIC VEINS: IVC: The inferior vena cava was normal in size   Respirophasic changes were normal     SYSTEM MEASUREMENT TABLES    2D  %FS: 27 49 %  Ao Diam: 2 77 cm  EDV(Teich): 57 94 ml  EF(Teich): 54 26 %  ESV(Teich): 26 5 ml  IVSd: 2 46 cm  LA Area: 26 06 cm2  LA Diam: 4 27 cm  LVIDd: 3 7 cm  LVIDs: 2 68 cm  LVPWd: 1 79 cm  RA Area: 18 49 cm2  RVIDd: 3 01 cm  RWT: 0 97  SV(Teich): 31 44 ml    CW  TR Vmax: 2 14 m/s  TR maxP 28 mmHg    MM  TAPSE: 1 51 cm    [employer] Accredited Echocardiography Laboratory    Prepared and electronically signed by    Kimberly Chamberlain MD  Signed [id number] 14:44:53    Results for orders placed during the hospital encounter of 20    HUBER    Narrative  [facility] [address]  [place], [place] [address]  [phone]    Transesophageal Echocardiogram  2D, Doppler, and Color Doppler    Study date:  20-May-2020    Patient: Rebecca Tobias  MR number: [id number]  Account number: [de-identified]  : 1965  Age: 54 years  Gender: Female  Status: Outpatient  Location: Cath lab  Height: 67 in  Weight: 221 lb  BP:    Indications: AFIB    Diagnoses: I48 0 - Atrial fibrillation    Sonographer:  PRISCILLA Hsieh  Interpreting Physician:  Joanne Mabry MD  Primary Physician:  Rick Vasquez MD  Referring Physician:  Joanne Mabry MD  Group:  [facility]    SUMMARY    LEFT VENTRICLE:  Systolic function was normal  Ejection fraction was estimated to be 60 %  Wall thickness was moderately increased  There was moderate concentric hypertrophy  LEFT ATRIUM:  The atrium was mildly dilated  LEFT ATRIAL APPENDAGE:  The size was normal   The function was normal (normal emptying velocity)  No thrombus was identified  ATRIAL SEPTUM:  No defect or patent foramen ovale was identified  HISTORY: PRIOR HISTORY: AFIB, PPM, MI, HOCM, HTN, HLD, CKD III, Smoker, Cocaine abuse    PROCEDURE: The procedure was performed in the catheterization laboratory  This was a routine study  The risks and alternatives of the procedure were explained to the patient and informed consent was obtained  The transesophageal approach  was used  The study included complete 2D imaging, complete spectral Doppler, and color Doppler  An adult omniplane probe was inserted by the attending cardiologist  Intubated with ease  One intubation attempt(s)  There was no blood  detected on the probe  Image quality was adequate  MEDICATIONS: Anesthesia  LEFT VENTRICLE: Size was normal  Systolic function was normal  Ejection fraction was estimated to be 60 %  Wall thickness was moderately increased  There was moderate concentric hypertrophy      RIGHT VENTRICLE: The size was normal  Systolic function was normal  Wall thickness "was normal  A pacing wire was present  LEFT ATRIUM: The atrium was mildly dilated  No mass was present  There was no spontaneous echo contrast (\"smoke\")  APPENDAGE: The size was normal  No thrombus was identified  DOPPLER: The function was normal (normal emptying velocity)  ATRIAL SEPTUM: No defect or patent foramen ovale was identified  RIGHT ATRIUM: Size was normal  No thrombus was identified  MITRAL VALVE: Valve structure was normal  There was normal leaflet separation  There was no echocardiographic evidence of vegetation  DOPPLER: There was no regurgitation  AORTIC VALVE: The valve was trileaflet  Leaflets exhibited normal thickness and normal cuspal separation  There was no echocardiographic evidence of vegetation  DOPPLER: There was no regurgitation  TRICUSPID VALVE: The valve structure was normal  There was normal leaflet separation  There was no echocardiographic evidence of vegetation  DOPPLER: There was no regurgitation  PERICARDIUM: There was no pericardial effusion  The pericardium was normal in appearance  [address] Accredited Echocardiography Laboratory    Prepared and electronically signed by    Benny Raza MD  Signed [id number] 09:25:57      CATH:  No results found for this or any previous visit  STRESS TEST:  No results found for this or any previous visit        VTE Prophylaxis: Xarelto    "

## 2023-05-10 NOTE — PROGRESS NOTES
[facility]  Progress Note  Name: Mello Wells  MRN: [id number]  Unit/Bed#: -01 I Date of Admission: 5/9/2023   Date of Service: 5/10/2023 I Hospital Day: 1    Assessment/Plan   Pancytopenia   Assessment & Plan  -counts stable, CTM    Morbid obesity  Assessment & Plan  BMI of 37 19  Lifestyle/diet modifications    Essential hypertension  Assessment & Plan  Low-sodium diet  Continue Cardura/Zestril/Cardizem/Toprol    Chronic kidney disease stage 3   Assessment & Plan  Lab Results   Component Value Date    EGFR 27 05/10/2023    EGFR 35 05/09/2023    EGFR 38 05/08/2023    CREATININE 1 98 (H) 05/10/2023    CREATININE 1 60 (H) 05/09/2023    CREATININE 1 49 (H) 05/08/2023     Baseline creatinine of approximately 1 6-1 7 -> currently Cr 1 98, if worsens further will c/s renal  Monitor renal function and urine output -> limit/avoid nephrotoxins and hypotension as possible -> note Lasix/Zestril use    Acute on chronic diastolic CHF  Assessment & Plan  -EF of 65% last month  -cont Lasix 80mg IV BID  -cont Toprol-XL     Tobacco abuse  Assessment & Plan  Cessation counseling  Transdermal nicotine patch on board    History of ventricular tachycardia  Assessment & Plan  S/p ICD    Gastroesophageal reflux disease   Assessment & Plan  Continue PPI    * Atrial flutter with RVR  Assessment & Plan  -cont Cardizem CD (dose increased prior to transfer by cardiology) and Toprol-XL  -was transiently placed on an Amiodarone infusion in the [address] [place] [address] ED on arrival 5/8/23  -cont Xarelto  -Recent cardioversion last month and history of ablation in June 2022  -Transferred to the [address] [place] [address] for electrophysiology evaluation/intervention  For AVN ablation today as per discussion with EP  -tele         Intention was to Admit as INPATIENT on 05/09/2023 @ 1614        VTE Pharmacologic Prophylaxis: VTE Score: 1 Xarelto    Patient Centered Rounds: I performed bedside rounds with nursing staff today     Education and Discussions with Family / Patient: family at bedside    Total Time Spent on Date of Encounter in care of patient: 35 minutes This time was spent on one or more of the following: performing physical exam; counseling and coordination of care; obtaining or reviewing history; documenting in the medical record; reviewing/ordering tests, medications or procedures; communicating with other healthcare professionals and discussing with patient's family/caregivers  Current Length of Stay: 1 day(s)  Current Patient Status: Inpatient   Certification Statement: The patient will continue to require additional inpatient hospital stay due to CHF, A-flutter  Discharge Plan: Anticipate discharge in 48-72 hrs to home  Code Status: Level 1 - Full Code    Subjective:   Patient denies chest pain or shortness of breath  Objective:     Vitals:   Temp (24hrs), Av 2 °F (36 8 °C), Min:97 6 °F (36 4 °C), Max:98 6 °F (37 °C)    Temp:  [97 6 °F (36 4 °C)-98 6 °F (37 °C)] 98 6 °F (37 °C)  HR:  [] 122  Resp:  [14-19] 14  BP: (119-146)/(59-90) 129/87  SpO2:  [94 %-96 %] 96 %  Body mass index is 37 28 kg/m²       Input and Output Summary (last 24 hours):   No intake or output data in the 24 hours ending 05/10/23 1407    Physical Exam:   Gen: NAD, AAOx3, well developed, well nourished  Eyes: EOMI, PERRLA, no scleral icterus  ENMT:  no nasal discharge, no otic discharge, moist mucous membranes  Neck:  Supple  Cardiovascular:  Regular rate and rhythm, normal S1-S2, no murmurs, rubs, or gallops  Lungs:  Clear to auscultation bilaterally, no wheezes, or rales, or rhonchi  Abdomen:  Positive bowel sounds, soft, nontender, nondistended, no palpable organomegaly   Skin:  Intact, no obvious lesions or rashes, no edema  Neuro: Cranial nerves 2-12 are intact, non-focal, 5/5 strength in all 4 extremities      Additional Data:     Labs:  Results from last 7 days   Lab Units 05/10/23  0510 23  5401 23  6495 WBC Thousand/uL 3 28*   < > 7 35   HEMOGLOBIN g/dL 9 3*   < > 9 9*   HEMATOCRIT % 30 0*   < > 32 3*   PLATELETS Thousands/uL 142*   < > 138*   NEUTROS PCT %  --   --  78*   LYMPHS PCT %  --   --  14   MONOS PCT %  --   --  8   EOS PCT %  --   --  0    < > = values in this interval not displayed  Results from last 7 days   Lab Units 05/10/23  0510 05/08/23  0652 05/07/23  2345   SODIUM mmol/L 138   < > 139   POTASSIUM mmol/L 4 2   < > 4 3   CHLORIDE mmol/L 109*   < > 107   CO2 mmol/L 28   < > 24   BUN mg/dL 25   < > 18   CREATININE mg/dL 1 98*   < > 1 57*   ANION GAP mmol/L 1*   < > 8   CALCIUM mg/dL 8 5   < > 8 5   ALBUMIN g/dL  --   --  3 8   TOTAL BILIRUBIN mg/dL  --   --  0 71   ALK PHOS U/L  --   --  61   ALT U/L  --   --  11   AST U/L  --   --  12*   GLUCOSE RANDOM mg/dL 94   < > 104    < > = values in this interval not displayed  Lines/Drains:  Invasive Devices     Peripheral Intravenous Line  Duration           Peripheral IV 05/10/23 Dorsal (posterior); Right Forearm <1 day                  Telemetry:  Telemetry Orders (From admission, onward)             48 Hour Telemetry Monitoring  Continuous x 48 hours        References:    Telemetry Guidelines   Question:  Reason for 48 Hour Telemetry  Answer:  Arrhythmias Requiring Medical Therapy (eg  SVT, Vtach/fib, Bradycardia, Uncontrolled A-fib)                 Telemetry Reviewed: Atrial flutter   HR averaging 100s  Indication for Continued Telemetry Use: Awaiting PCI/EP Study/CABG             Recent Cultures (last 7 days):         Last 24 Hours Medication List:   Current Facility-Administered Medications   Medication Dose Route Frequency Provider Last Rate   • acetaminophen  975 mg Oral Q8H Nilesh Thrasher MD     • amoxicillin  500 mg Oral TID Nilesh Thrasher MD     • butalbital-acetaminophen-caffeine  1 tablet Oral Q4H PRN Nilesh Thrasher MD     • Diclofenac Sodium  2 g Topical Q6H PRN Nilesh Thrasher MD     • diltiazem  240 mg Oral Daily Catrachita Marta Hankins MD     • diphenhydrAMINE  25 mg Intravenous Q8H PRN Yoshi Mejía MD     • doxazosin  2 mg Oral HS Yoshi Mejía MD     • furosemide  80 mg Intravenous BID (diuretic) Yoshi Mejía MD     • lisinopril  20 mg Oral Daily Yoshi Mejía MD     • magnesium sulfate  2 g Intravenous Q24H [address] Yoshi Mejía MD 2 g (05/10/23 0909)   • melatonin  3 mg Oral HS Yoshi Mejía MD     • metoclopramide  10 mg Intravenous Q8H Sampson Leyden, MD     • metoprolol succinate  150 mg Oral BID Yoshi Mejía MD     • nicotine  1 patch Transdermal Daily Yoshi Mejía MD     • nystatin   Topical BID Yoshi Mejía MD     • pantoprazole  40 mg Oral Early Morning Yoshi Mejía MD     • rivaroxaban  15 mg Oral QPM Yoshi Mejía MD     • trimethobenzamide  200 mg Intramuscular Q6H PRN Yoshi Mejía MD          Today, Patient Was Seen By: Elmo Mancera MD    **Please Note: This note may have been constructed using a voice recognition system  **
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
## 2023-05-10 NOTE — ASSESSMENT & PLAN NOTE
-cont Cardizem CD (dose increased prior to transfer by cardiology) and Toprol-XL  -was transiently placed on an Amiodarone infusion in the [address] [place] [address] ED on arrival 5/8/23  -cont Xarelto  -Recent cardioversion last month and history of ablation in June 2022  -Transferred to the [facility] for electrophysiology evaluation/intervention   For AVN ablation today as per discussion with EP  -tele

## 2023-05-11 ENCOUNTER — APPOINTMENT (INPATIENT)
Dept: NON INVASIVE DIAGNOSTICS | Facility: HOSPITAL | Age: 58
End: 2023-05-11

## 2023-05-11 LAB
ANION GAP SERPL CALCULATED.3IONS-SCNC: 1 MMOL/L (ref 4–13)
ATRIAL RATE: 78 BPM
BUN SERPL-MCNC: 23 MG/DL (ref 5–25)
CALCIUM SERPL-MCNC: 8.8 MG/DL (ref 8.3–10.1)
CHLORIDE SERPL-SCNC: 104 MMOL/L (ref 96–108)
CO2 SERPL-SCNC: 31 MMOL/L (ref 21–32)
CREAT SERPL-MCNC: 2.04 MG/DL (ref 0.6–1.3)
GFR SERPL CREATININE-BSD FRML MDRD: 26 ML/MIN/1.73SQ M
GLUCOSE SERPL-MCNC: 109 MG/DL (ref 65–140)
HCT VFR BLD AUTO: 35.8 % (ref 34.8–46.1)
HGB BLD-MCNC: 11.1 G/DL (ref 11.5–15.4)
P AXIS: 88 DEGREES
POTASSIUM SERPL-SCNC: 3.9 MMOL/L (ref 3.5–5.3)
QRS AXIS: -74 DEGREES
QRSD INTERVAL: 196 MS
QT INTERVAL: 494 MS
QTC INTERVAL: 610 MS
SODIUM SERPL-SCNC: 136 MMOL/L (ref 135–147)
T WAVE AXIS: 102 DEGREES
VENTRICULAR RATE: 92 BPM

## 2023-05-11 RX ORDER — METOPROLOL SUCCINATE 50 MG/1
50 TABLET, EXTENDED RELEASE ORAL DAILY
Status: DISCONTINUED | OUTPATIENT
Start: 2023-05-12 | End: 2023-05-12 | Stop reason: HOSPADM

## 2023-05-11 RX ADMIN — OXYCODONE HYDROCHLORIDE 5 MG: 5 TABLET ORAL at 09:56

## 2023-05-11 RX ADMIN — OXYCODONE HYDROCHLORIDE 5 MG: 5 TABLET ORAL at 16:14

## 2023-05-11 RX ADMIN — METOPROLOL SUCCINATE 50 MG: 50 TABLET, EXTENDED RELEASE ORAL at 09:57

## 2023-05-11 RX ADMIN — MAGNESIUM SULFATE HEPTAHYDRATE 2 G: 40 INJECTION, SOLUTION INTRAVENOUS at 11:47

## 2023-05-11 RX ADMIN — LISINOPRIL 20 MG: 20 TABLET ORAL at 09:57

## 2023-05-11 RX ADMIN — ACETAMINOPHEN 975 MG: 325 TABLET ORAL at 05:09

## 2023-05-11 RX ADMIN — NYSTATIN: 100000 POWDER TOPICAL at 10:42

## 2023-05-11 RX ADMIN — DILTIAZEM HYDROCHLORIDE 120 MG: 120 CAPSULE, COATED, EXTENDED RELEASE ORAL at 09:57

## 2023-05-11 RX ADMIN — NICOTINE 1 PATCH: 21 PATCH, EXTENDED RELEASE TRANSDERMAL at 09:57

## 2023-05-11 RX ADMIN — PANTOPRAZOLE SODIUM 40 MG: 40 TABLET, DELAYED RELEASE ORAL at 05:09

## 2023-05-11 RX ADMIN — OXYCODONE HYDROCHLORIDE 5 MG: 5 TABLET ORAL at 22:44

## 2023-05-11 RX ADMIN — LIDOCAINE 5% 1 PATCH: 700 PATCH TOPICAL at 10:42

## 2023-05-11 RX ADMIN — RIVAROXABAN 15 MG: 15 TABLET, FILM COATED ORAL at 16:14

## 2023-05-11 RX ADMIN — AMOXICILLIN 500 MG: 500 CAPSULE ORAL at 05:10

## 2023-05-11 RX ADMIN — ACETAMINOPHEN 975 MG: 325 TABLET ORAL at 21:24

## 2023-05-11 RX ADMIN — ACETAMINOPHEN 975 MG: 325 TABLET ORAL at 14:09

## 2023-05-11 RX ADMIN — AMOXICILLIN 500 MG: 500 CAPSULE ORAL at 14:10

## 2023-05-11 RX ADMIN — FUROSEMIDE 80 MG: 10 INJECTION, SOLUTION INTRAMUSCULAR; INTRAVENOUS at 09:56

## 2023-05-11 RX ADMIN — METOCLOPRAMIDE HYDROCHLORIDE 10 MG: 5 INJECTION INTRAMUSCULAR; INTRAVENOUS at 05:09

## 2023-05-11 RX ADMIN — MELATONIN 6 MG: at 21:24

## 2023-05-11 RX ADMIN — AMOXICILLIN 500 MG: 500 CAPSULE ORAL at 21:24

## 2023-05-11 RX ADMIN — DOXAZOSIN 2 MG: 2 TABLET ORAL at 21:24

## 2023-05-11 NOTE — PROGRESS NOTES
General Cardiology   Progress Note -  [facility]   Jarek Gale 62 y o  female MRN: [id number]    Unit/Bed#: -01 Encounter: [id number]    Assessment  1  Symptomatic persistent atrial flutter w/ rapid ventricular response  2  Paroxsymal atrial fibrillation; s/p  prior cryoablation with pulmonary vein isolation 5/2020  -EP following  -S/p AVJ ablation procedure 5/10/2023  -On metoprolol succinate 50 mg twice daily and Cardizem  mg daily  -[id number] 4 - on Xarelto 15 mg daily  3  Acute on chronic HFpEF  4  HCM  5  Hx of tachyinduced CM (EF as low as 30% in 2021), now recovered  - BNP 1328 (previously 2056 12/20/2022)  - CT A/P 5/9/2023 - mild pulmonary edema of lung bases, trace right pleural effusion  -TTE (4/5/2023) - LVEF 65%, severe asymmetric hypertrophy of septal and apical walls (HCM), normal systolic function, RWMA cannot be excluded on the basis of the study, mild TR  -Outpatient GDMT; lisinopril 20 mg daily (on hold), and metoprolol succinate 150 mg twice daily (dose now reduced to 50 mg BID)  -Outpatient diuretic regimen - oral furosemide 40 mg daily  -Currently on IV lasix 80 mg IV BID  -24-hour GRANT balance; +970; overall +610 ml  -Dry weight reported at 234 lb; discharge weight of 235 lb (4/6/2023)    Weight this am 233 pounds  6  History of VT s/p ICD  - MDT DC ICD (2016) by Dr Osmin Saenz  - S/p ICD shock due to rapid atrial flutter; no ventricular arrhythmias on (4/4/2023)  7  Hypertension  -Average /90, last recorded 139/93, HR 79   -Outpatient antihypertensive regimen - doxazosin 2 mg daily, lisinopril 20 mg daily, furosemide 40 mg daily, metoprolol succinate 150 mg twice daily, Cardizem  mg daily  -Inpatient BP regimen;  doxazosin 2 mg daily, lisinopril 20 mg daily, IV furosemide 80 mg twice daily, metoprolol succinate 50 mg twice daily, and Cardizem  mg daily  8  Hyperlipidemia  -Lipid panel (4/5/2023) - /triglycerides 83/HDL 40/LDL 68  -Not maintained on statin therapy outpatient  9  CKD stage III  -Baseline creatinine 1 6-1 8  -Creatinine level currently 2 04 -up from 1 98 yesterday; gradually uptrending over the past 3 days  10  SURINDER - on CPAP at HS  11  Tobacco abuse; smoking cessation, nicotine patch    Plan  -Pt feels well this a m  without any specific cardiac complaints  Hemodynamics remained stable  She remains V paced on cardiac monitoring  -Appears stable from a volume/respiratory perspective, euvolemic on exam this AM   Renal function slightly up from yesterday and gradually uptrending over the past 3 days  Recommend  discontinuation of further IV diuretics, if renal function stable in the a m  would restart oral diuretics at that point    -Continue metoprolol succinate 50 mg BID, per EP okay to DC oral Cardizem  -Continue Xarelto 15 mg daily  -Hold ACEI  -Strict I's and O's, daily standing weights, 2 g and +1 8 LFR   -Monitor renal function and electrolytes closely  Replete to maintain K+ level at 4 0 magnesium level 2 0  Obtain BMP in the AM   -Continue to monitor on telemetry  Subjective  Review of Systems   Constitutional: Negative for chills, fever and malaise/fatigue  Eyes: Negative for visual disturbance  Cardiovascular: Negative for chest pain, dyspnea on exertion, leg swelling, orthopnea and palpitations  Respiratory: Negative for cough and shortness of breath  Gastrointestinal: Negative for abdominal pain  Neurological: Negative for dizziness, headaches and light-headedness  Objective:   Physical Exam  Vitals and nursing note reviewed  Constitutional:       General: She is not in acute distress  Appearance: She is obese  She is not diaphoretic  HENT:      Head: Normocephalic and atraumatic  Eyes:      General: No scleral icterus  Neck:      Comments: No JVD  Cardiovascular:      Rate and Rhythm: Normal rate  Pulses: Normal pulses  Heart sounds: Normal heart sounds  No murmur heard       Comments: "V paced  Pulmonary:      Effort: Pulmonary effort is normal       Breath sounds: Normal breath sounds  No wheezing or rales  Abdominal:      Palpations: Abdomen is soft  Musculoskeletal:      Right lower leg: No edema  Left lower leg: No edema  Skin:     General: Skin is warm and dry  Capillary Refill: Capillary refill takes less than 2 seconds  Neurological:      General: No focal deficit present  Mental Status: She is alert and oriented to person, place, and time  Psychiatric:         Mood and Affect: Mood normal          Vitals: Blood pressure 139/93, pulse 79, temperature 97 5 °F (36 4 °C), resp  rate 17, height 5' 7\" (1 702 m), weight 106 kg (233 lb 9 6 oz), SpO2 96 %, not currently breastfeeding ,     Body mass index is 36 59 kg/m²  ,   Systolic (22BHU), JBR:281 , Min:110 , YDU:063     Diastolic (23BRN), TTE:01, Min:72, Max:105      Intake/Output Summary (Last 24 hours) at 5/11/2023 1210  Last data filed at 5/11/2023 1122  Gross per 24 hour   Intake 1410 ml   Output 800 ml   Net 610 ml     Weight (last 2 days)     Date/Time Weight    05/11/23 0600 106 (233 6)    05/10/23 0600 108 (238)    05/09/23 1622 108 (237 46)          LABORATORY RESULTS      CBC with diff:   Results from last 7 days   Lab Units 05/10/23  0510 05/09/23  0436 05/08/23  0652 05/07/23  2345 05/06/23  1343   WBC Thousand/uL 3 28* 3 69* 3 89* 7 35 5 85   HEMOGLOBIN g/dL 9 3* 9 1* 9 2* 9 9* 10 3*   HEMATOCRIT % 30 0* 30 1* 30 1* 32 3* 33 0*   MCV fL 83 84 84 84 82   PLATELETS Thousands/uL 142* 110* 109* 138* 149   MCH pg 25 8* 25 3* 25 6* 25 6* 25 7*   MCHC g/dL 31 0* 30 2* 30 6* 30 7* 31 2*   RDW % 15 5* 15 6* 15 5* 15 6* 15 5*   MPV fL 12 7 12 2 11 0 12 2 11 7   NRBC AUTO /100 WBCs  --   --   --  0 0       CMP:  Results from last 7 days   Lab Units 05/11/23  1059 05/10/23  0510 05/09/23  0436 05/08/23  0652 05/07/23  2345 05/06/23  1343   POTASSIUM mmol/L 3 9 4 2 4 1 3 8 4 3 4 0   CHLORIDE mmol/L 104 109* 107 107 107 " 107   CO2 mmol/L 31 28 25 25 24 24   BUN mg/dL 23 25 22 15 18 22   CREATININE mg/dL 2 04* 1 98* 1 60* 1 49* 1 57* 1 77*   CALCIUM mg/dL 8 8 8 5 8 4 7 9* 8 5 9 0   AST U/L  --   --   --   --  12* 15   ALT U/L  --   --   --   --  11 15   ALK PHOS U/L  --   --   --   --  61 59   EGFR ml/min/1 73sq m 26 27 35 38 36 31       BMP:  Results from last 7 days   Lab Units 23  1059 05/10/23  0510 23  0436 23  0652 23  2345 23  1343   POTASSIUM mmol/L 3 9 4 2 4 1 3 8 4 3 4 0   CHLORIDE mmol/L 104 109* 107 107 107 107   CO2 mmol/L 31 28 25 25 24 24   BUN mg/dL 23 25 22 15 18 22   CREATININE mg/dL 2 04* 1 98* 1 60* 1 49* 1 57* 1 77*   CALCIUM mg/dL 8 8 8 5 8 4 7 9* 8 5 9 0       Lab Results   Component Value Date    NTBNP 9,053 (H) 2023    NTBNP 5,423 (H) 2023    NTBNP 16,909 (H) 2022        Results from last 7 days   Lab Units 05/10/23  0510 23  0436 23  0652 23  2345   MAGNESIUM mg/dL 2 6 2 3 2 6 1 9                         Lipid Profile:   No results found for: CHOL  Lab Results   Component Value Date    HDL 40 (L) 2023    HDL 42 (L) 2021    HDL 43 2020     Lab Results   Component Value Date    LDLCALC 68 2023    LDLCALC 57 2021    LDLCALC 72 2020     Lab Results   Component Value Date    TRIG 83 2023    TRIG 80 9 2021    TRIG 89 2020       Cardiac testing:   Results for orders placed during the hospital encounter of 21    Echo complete with contrast if indicated    [address]    Transthoracic Echocardiogram  2D, M-mode, Doppler, and Color Doppler    Study date:  25-May-2021    Patient: Uri Mcmullen  MR number: [id number]  Account number: [de-identified]  : 1965  Age: 64 years  Gender: Female  Status: Inpatient  Location: [facility]  Height: 67 in  Weight: 212 lb  BP: 118/ 62 mmHg    Indications: Afib    Diagnoses: I48 0 - Atrial fibrillation    Sonographer:  PRISCILLA Purcell  Referring Physician:  Syeda Ryan MD  Group:  [facility]  Interpreting Physician:  Michael Martin MD    SUMMARY    LEFT VENTRICLE:  Systolic function was moderately to markedly reduced  Ejection fraction was estimated to be 30 %  There was moderate diffuse hypokinesis  There was severe concentric hypertrophy  There was significant hypertrophy of the LV apex  RIGHT VENTRICLE:  The size was normal   Systolic function was reduced  LEFT ATRIUM:  The atrium was dilated  HISTORY: PRIOR HISTORY: Cocaine abuse, Smoker, CKD III, Congestive heart failure  Hypertrophic cardiomyopathy  Atrial fibrillation  Risk factors: hypercholesterolemia  PRIOR PROCEDURES: ICD implantation  Arrhythmia ablation  PROCEDURE: The study was performed in the [facility]  This was a routine study  The transthoracic approach was used  The study included complete 2D imaging, M-mode, complete spectral Doppler, and color Doppler  The heart rate was 138  bpm, at the start of the study  Images were obtained from the parasternal, apical, subcostal, and suprasternal notch acoustic windows  Intravenous contrast (  6 mL Definity in NSS) was administered  Echocardiographic views were limited due  to poor acoustic window availability and lung interference  This was a technically difficult study  LEFT VENTRICLE: Size was normal  Systolic function was moderately to markedly reduced  Ejection fraction was estimated to be 30 %  There was moderate diffuse hypokinesis  Wall thickness was markedly increased  There was severe concentric  hypertrophy  There was significant hypertrophy of the LV apex  DOPPLER: Due to tachycardia, there was fusion of early and atrial contributions to ventricular filling  The study was not technically sufficient to allow evaluation of LV  diastolic function      RIGHT VENTRICLE: The size was normal  Systolic function was reduced  Wall thickness was normal  A pacing wire was present  LEFT ATRIUM: The atrium was dilated  RIGHT ATRIUM: Size was normal  A pacing wire was present  MITRAL VALVE: Valve structure was normal  There was normal leaflet separation  DOPPLER: The transmitral velocity was within the normal range  There was no evidence for stenosis  There was trace regurgitation  AORTIC VALVE: The valve was trileaflet  Leaflets exhibited normal thickness and normal cuspal separation  DOPPLER: Transaortic velocity was within the normal range  There was no evidence for stenosis  There was no significant  regurgitation  TRICUSPID VALVE: The valve structure was normal  There was normal leaflet separation  DOPPLER: The transtricuspid velocity was within the normal range  There was no evidence for stenosis  There was trace regurgitation  Pulmonary artery  systolic pressure was within the normal range  Estimated peak PA pressure was 21 mmHg  PULMONIC VALVE: Leaflets exhibited normal thickness, no calcification, and normal cuspal separation  DOPPLER: The transpulmonic velocity was within the normal range  There was trace regurgitation  PERICARDIUM: There was no pericardial effusion  The pericardium was normal in appearance  AORTA: The root exhibited normal size  SYSTEMIC VEINS: IVC: The inferior vena cava was normal in size   Respirophasic changes were normal     SYSTEM MEASUREMENT TABLES    2D  %FS: 27 49 %  Ao Diam: 2 77 cm  EDV(Teich): 57 94 ml  EF(Teich): 54 26 %  ESV(Teich): 26 5 ml  IVSd: 2 46 cm  LA Area: 26 06 cm2  LA Diam: 4 27 cm  LVIDd: 3 7 cm  LVIDs: 2 68 cm  LVPWd: 1 79 cm  RA Area: 18 49 cm2  RVIDd: 3 01 cm  RWT: 0 97  SV(Teich): 31 44 ml    CW  TR Vmax: 2 14 m/s  TR maxP 28 mmHg    MM  TAPSE: 1 51 cm    [employer] Accredited Echocardiography Laboratory    Prepared and electronically signed by    Vinnie Yousif MD  Signed [id number] 14:44:53    Results for orders placed during the hospital encounter of 20    HUBER    Narrative  [address]  [facility], [address] [facility]  [phone]    Transesophageal Echocardiogram  2D, Doppler, and Color Doppler    Study date:  20-May-2020    Patient: Antoine Bautista  MR number: [id number]  Account number: [de-identified]  : 1965  Age: 54 years  Gender: Female  Status: Outpatient  Location: Cath lab  Height: 67 in  Weight: 221 lb  BP:    Indications: AFIB    Diagnoses: I48 0 - Atrial fibrillation    Sonographer:  PRISCILLA Mahmood  Interpreting Physician:  Maricel Bunn MD  Primary Physician:  Sherryle Ruby, MD  Referring Physician:  Maricel Bunn MD  Group:  [facility]    SUMMARY    LEFT VENTRICLE:  Systolic function was normal  Ejection fraction was estimated to be 60 %  Wall thickness was moderately increased  There was moderate concentric hypertrophy  LEFT ATRIUM:  The atrium was mildly dilated  LEFT ATRIAL APPENDAGE:  The size was normal   The function was normal (normal emptying velocity)  No thrombus was identified  ATRIAL SEPTUM:  No defect or patent foramen ovale was identified  HISTORY: PRIOR HISTORY: AFIB, PPM, MI, HOCM, HTN, HLD, CKD III, Smoker, Cocaine abuse    PROCEDURE: The procedure was performed in the catheterization laboratory  This was a routine study  The risks and alternatives of the procedure were explained to the patient and informed consent was obtained  The transesophageal approach  was used  The study included complete 2D imaging, complete spectral Doppler, and color Doppler  An adult omniplane probe was inserted by the attending cardiologist  Intubated with ease  One intubation attempt(s)  There was no blood  detected on the probe  Image quality was adequate  MEDICATIONS: Anesthesia  LEFT VENTRICLE: Size was normal  Systolic function was normal  Ejection fraction was estimated to be 60 %   Wall thickness was "moderately increased  There was moderate concentric hypertrophy  RIGHT VENTRICLE: The size was normal  Systolic function was normal  Wall thickness was normal  A pacing wire was present  LEFT ATRIUM: The atrium was mildly dilated  No mass was present  There was no spontaneous echo contrast (\"smoke\")  APPENDAGE: The size was normal  No thrombus was identified  DOPPLER: The function was normal (normal emptying velocity)  ATRIAL SEPTUM: No defect or patent foramen ovale was identified  RIGHT ATRIUM: Size was normal  No thrombus was identified  MITRAL VALVE: Valve structure was normal  There was normal leaflet separation  There was no echocardiographic evidence of vegetation  DOPPLER: There was no regurgitation  AORTIC VALVE: The valve was trileaflet  Leaflets exhibited normal thickness and normal cuspal separation  There was no echocardiographic evidence of vegetation  DOPPLER: There was no regurgitation  TRICUSPID VALVE: The valve structure was normal  There was normal leaflet separation  There was no echocardiographic evidence of vegetation  DOPPLER: There was no regurgitation  PERICARDIUM: There was no pericardial effusion  The pericardium was normal in appearance  [address] Accredited Echocardiography Laboratory    Prepared and electronically signed by    Lucie Gutierrez MD  Signed [id number] 09:25:57    No results found for this or any previous visit  No valid procedures specified  No results found for this or any previous visit        Meds/Allergies   all current active meds have been reviewed and current meds:   Current Facility-Administered Medications   Medication Dose Route Frequency   • acetaminophen (TYLENOL) tablet 975 mg  975 mg Oral Q8H   • amoxicillin (AMOXIL) capsule 500 mg  500 mg Oral TID   • butalbital-acetaminophen-caffeine (FIORICET,ESGIC) -40 mg per tablet 1 tablet  1 tablet Oral Q4H PRN   • Diclofenac Sodium (VOLTAREN) 1 % topical gel 2 g  2 " g Topical Q6H PRN   • diltiazem (CARDIZEM CD) 24 hr capsule 120 mg  120 mg Oral Daily   • diphenhydrAMINE (BENADRYL) injection 25 mg  25 mg Intravenous Q8H PRN   • doxazosin (CARDURA) tablet 2 mg  2 mg Oral HS   • furosemide (LASIX) injection 80 mg  80 mg Intravenous BID (diuretic)   • lidocaine (LIDODERM) 5 % patch 1 patch  1 patch Topical Daily   • lisinopril (ZESTRIL) tablet 20 mg  20 mg Oral Daily   • magnesium sulfate 2 g/50 mL IVPB (premix) 2 g  2 g Intravenous Q24H JEISON   • melatonin tablet 6 mg  6 mg Oral HS   • metoclopramide (REGLAN) injection 10 mg  10 mg Intravenous Q8H [address]   • metoprolol succinate (TOPROL-XL) 24 hr tablet 50 mg  50 mg Oral BID   • nicotine (NICODERM CQ) 21 mg/24 hr TD 24 hr patch 1 patch  1 patch Transdermal Daily   • nystatin (MYCOSTATIN) powder   Topical BID   • oxyCODONE (ROXICODONE) IR tablet 5 mg  5 mg Oral Q6H PRN   • pantoprazole (PROTONIX) EC tablet 40 mg  40 mg Oral Early Morning   • rivaroxaban (XARELTO) tablet 15 mg  15 mg Oral QPM   • trimethobenzamide (TIGAN) IM injection 200 mg  200 mg Intramuscular Q6H PRN          EKG personally reviewed by CHARLOTTE Haddad    Assessment:  Principal Problem:    Atrial flutter with RVR  Active Problems:    Gastroesophageal reflux disease     History of ventricular tachycardia    Tobacco abuse    Acute on chronic diastolic CHF    Chronic kidney disease stage 3     Essential hypertension    Morbid obesity    Pancytopenia     Counseling / Coordination of Care  Total floor / unit time spent today 20 minutes  Greater than 50% of total time was spent with the patient and / or family counseling and / or coordination of care  ** Please Note: Dragon 360 Dictation voice to text software may have been used in the creation of this document   **

## 2023-05-11 NOTE — CASE MANAGEMENT
Case Management Assessment & Discharge Planning Note    [facility]  Location /[facility] MRN [id number]  : 1965 Date 2023       Current Admission Date: 2023  Current Admission Diagnosis:Atrial flutter with RVR   Patient Active Problem List    Diagnosis Date Noted   • Abnormal finding on CT scan 2023   • Morbid obesity 2023   • Pancytopenia  2023   • Left low back pain 2022   • Chronic heart failure with preserved ejection fraction (HFpEF) ([facility] ) 10/13/2022   • Renal cyst 2022   • Diverticulitis of large intestine without perforation or abscess 2022   • Leucopenia 2022   • Leg edema 2022   • Primary hypertension 2022   • Nephrolithiasis 2022   • Essential hypertension 2022   • Electrolyte abnormality 2022   • Chronic kidney disease stage 3  2022   • Atypical atrial flutter ([facility] ) 2022   • Chronic hepatitis C without hepatic coma ([facility] ) 2022   • Acute right flank pain 08/15/2021   • Epigastric abdominal tenderness 08/15/2021   • Leg swelling 2020   • Paroxysmal A-fib ([facility] ) 2020   • Cocaine abuse ([facility] ) 2020   • Elevated lipase 10/10/2019   • Palpitations 2019   • Acute on chronic diastolic CHF    • Headache 2018   • Tobacco abuse 2018   • NSTEMI (non-ST elevated myocardial infarction) ([facility] ) 2018   • ICD (implantable cardioverter-defibrillator) discharge with atypical atrial flutter 1:1 conduction 05/10/2017   • History of ventricular tachycardia 2016   • Atrial flutter with RVR 2016   • Gastroesophageal reflux disease  2016      LOS (days): 2  Geometric Mean LOS (GMLOS) (days):   Days to GMLOS:     OBJECTIVE:    Risk of Unplanned Readmission Score: 43 5         Current admission status: Inpatient       Preferred Pharmacy:   [address] [place] [address] [id number] [facility] [address] [address]  Phone: [phone] Fax: Jacinto Louise, [address] [facility]  [address] [address] [place]  [address]  Phone: [phone] Fax: [phone]    Primary Care Provider: Jason Castellanos MD    Primary Insurance: [address] [facility]  Secondary Insurance:     ASSESSMENT:  [place], [address] Representative - Spouse   Primary Phone: [phone] (Mobile)  Home Phone: [phone]               Advance Directives  Does patient have a [address]?: No  Was patient offered paperwork?: Yes (provided)  Does patient currently have a Health Care decision maker?: No  Does patient have Advance Directives?: No  Was patient offered paperwork?: Yes (provided)  Primary Contact: Vikas Eduardo (Spouse)   [phone]         Readmission Root Cause  30 Day Readmission: No    Patient Information  Admitted from[de-identified] Facility (transfer from [address] [place] [address])  Mental Status: Alert  During Assessment patient was accompanied by: Not accompanied during assessment  Assessment information provided by[de-identified] Patient  Primary Caregiver: Self  Support Systems: Self, Spouse/significant other  [place] of Residence: [address] do you live in?: [place] entry access options   Select all that apply : No steps to enter home  Type of Current Residence: Apartment  Floor Level: 1  Upon entering residence, is there a bedroom on the main floor (no further steps)?: Yes  Upon entering residence, is there a bathroom on the main floor (no further steps)?: Yes  In the last 12 months, was there a time when you were not able to pay the mortgage or rent on time?: No  In the last 12 months, how many places have you lived?: 1  In the last 12 months, was there a time when you did not have a steady place to sleep or slept in a shelter (including now)?: No  Homeless/housing insecurity resource given?: N/A  Living Arrangements: Lives w/ Spouse/significant other  Is patient a ?: No    Activities of Daily Living Prior to Admission  Functional Status: Independent  Completes ADLs independently?: Yes  Ambulates independently?: Yes  Does patient use assisted devices?: Yes  Assisted Devices (DME) used:  Shower Chair, Straight Cane  Does patient currently own DME?: Yes  What DME does the patient currently own?: Bedside Commode, Rollator, Wheelchair, Walker, Straight Cane, Shower Chair  Does patient have a history of Outpatient Therapy (PT/OT)?: Yes  Does the patient have a history of Short-Term Rehab?: No  Does patient have a history of HHC?: Yes  Does patient currently have [address]?: No         Patient Information Continued  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)  Does patient have prescription coverage?: Yes ([address])  Within the past 12 months, you worried that your food would run out before you got the money to buy more : Sometimes true (does not qualify for SNAP and knows where food pantries are)  Within the past 12 months, the food you bought just didn't last and you didn't have money to get more : Sometimes true (does not qualify for SNAP and knows where food pantries are)  Food insecurity resource given?: No (does not qualify for SNAP and knows where food pantries are)  Does patient receive dialysis treatments?: No  Does patient have a history of substance abuse?: No  Does patient have a history of Mental Health Diagnosis?: No         Means of Transportation  In the past 12 months, has lack of transportation kept you from medical appointments or from getting medications?: No  In the past 12 months, has lack of transportation kept you from meetings, work, or from getting things needed for daily living?: No  Was application for public transport provided?: N/A        DISCHARGE DETAILS:    Discharge planning discussed with[de-identified] patient  Freedom of Choice: Yes  Comments - Freedom of Choice: agreeable to [address] if needed  CM contacted family/caregiver?: No- see comments (refused)       Contacts  Patient Contacts: Gail Spivey (Spouse) [phone]  Relationship to Patient[de-identified] Family         Would you like to participate in our [address] service program?  : No - Declined         Additional Comments: Patient states she hsa had Covid 19 vaccine Moderna with 2 boosters  Patient states she DOES NOT want son Cait Rizo to get any information regarding this hospitalization so Hospitalist Dr Luis Dawson and MS 5 RN Vane Cohen were made aware  CM to be available      CM reviewed d/c planning process including the following: identifying help at home, patient preference for d/c planning needs, Discharge Lounge, [facility] Meds to Bed program, availability of treatment team to discuss questions or concerns patient and/or family may have regarding understanding medications and recognizing signs and symptoms once discharged  CM also encouraged patient to follow up with all recommended appointments after discharge  Patient advised of importance for patient and family to participate in managing patient’s medical well being

## 2023-05-11 NOTE — DISCHARGE INSTR - AVS FIRST PAGE
MEDICATION INSTRUCTIONS/CHANGES:      RESTRICTIONS:   No heavy lifting (more than 5-10 pounds) or strenuous activity for one week  No soaking in a bath tub/hot tub/swimming pool for one week or until groin heals  You may shower - please let soap and water run over the groins, no scrubbing, and pat the area dry  Please place band-aid on groins daily for up to five days, but you may remove sooner if no issues are noted  If you notice ongoing bleeding, swelling, or firm lumps in groin near ablation incision, please contact Dr Samy Thorpe' office - [phone]  If you have any significant issues after hours or on the weekends, please call the on call cardiology number at [phone]

## 2023-05-11 NOTE — PROGRESS NOTES
[address] [place] [address]  Progress Note  Name: Rusty Kuhn  MRN: [id number]  Unit/Bed#: -01 I Date of Admission: 5/9/2023   Date of Service: 5/11/2023 I Hospital Day: 2    Assessment/Plan   Pancytopenia   Assessment & Plan  -counts stable, CTM    Morbid obesity  Assessment & Plan  BMI of 37 19  Lifestyle/diet modifications    Tobacco abuse  Assessment & Plan  Cessation counseling  Transdermal nicotine patch on board    Essential hypertension  Assessment & Plan  Low-sodium diet  Continue Cardura/Zestril/Cardizem/Toprol    Chronic kidney disease stage 3   Assessment & Plan  Lab Results   Component Value Date    EGFR 27 05/10/2023    EGFR 35 05/09/2023    EGFR 38 05/08/2023    CREATININE 1 98 (H) 05/10/2023    CREATININE 1 60 (H) 05/09/2023    CREATININE 1 49 (H) 05/08/2023     Baseline creatinine of approximately 1 6-1 7 -> currently Cr 1 98, if worsens further will c/s renal  Monitor renal function and urine output -> limit/avoid nephrotoxins and hypotension as possible -> note Lasix/Zestril use    Acute on chronic diastolic CHF  Assessment & Plan  -EF of 65% last month  -cont Lasix 80mg IV BID  -cont Toprol-XL   -weight decreasing    History of ventricular tachycardia  Assessment & Plan  -S/p ICD  -cont Toprol XL    Gastroesophageal reflux disease   Assessment & Plan  -Continue PPI    * Atrial flutter with RVR  Assessment & Plan  -cont Cardizem CD (dose increased prior to transfer by cardiology) and Toprol-XL  -was transiently placed on an Amiodarone infusion in the [facility] on arrival 5/8/23  -cont Xarelto  -Recent cardioversion last month and history of ablation in June 2022  -Transferred to the [address] [place] [address] for electrophysiology evaluation/intervention  -s/p AV Drew Junctional ablation on 5/10/23  -tele             VTE Pharmacologic Prophylaxis: VTE Score: 1 Xarelto    Patient Centered Rounds: I performed bedside rounds with nursing staff today    Discussions with Specialists or Other Care Team Provider: cardiology    Total Time Spent on Date of Encounter in care of patient: 35 minutes This time was spent on one or more of the following: performing physical exam; counseling and coordination of care; obtaining or reviewing history; documenting in the medical record; reviewing/ordering tests, medications or procedures; communicating with other healthcare professionals and discussing with patient's family/caregivers  Current Length of Stay: 2 day(s)  Current Patient Status: Inpatient   Certification Statement: The patient will continue to require additional inpatient hospital stay due to CHF  Discharge Plan: Anticipate discharge in 24-48 hrs to home  Code Status: Level 1 - Full Code    Subjective:   Patient denies chest pain or shortness of breath  Objective:     Vitals:   Temp (24hrs), Av 9 °F (36 6 °C), Min:97 4 °F (36 3 °C), Max:98 6 °F (37 °C)    Temp:  [97 4 °F (36 3 °C)-98 6 °F (37 °C)] 97 6 °F (36 4 °C)  HR:  [] 80  Resp:  [14-18] 17  BP: (110-156)/() 136/91  SpO2:  [91 %-97 %] 97 %  Body mass index is 36 59 kg/m²  Input and Output Summary (last 24 hours):      Intake/Output Summary (Last 24 hours) at 2023 1102  Last data filed at 2023 0854  Gross per 24 hour   Intake 1410 ml   Output 200 ml   Net 1210 ml       Physical Exam:   Gen: NAD, AAOx3, well developed, well nourished  Eyes: EOMI, PERRLA, no scleral icterus  ENMT:  no nasal discharge, no otic discharge, moist mucous membranes  Neck:  Supple  Cardiovascular: remains Regular rate and rhythm, normal S1-S2, no murmurs, rubs, or gallops  Lungs: remains Clear to auscultation bilaterally, no wheezes, or rales, or rhonchi  Abdomen:  Positive bowel sounds, soft, nontender, nondistended, no palpable organomegaly   Skin:  Intact, no obvious lesions or rashes, no edema  Neuro: Cranial nerves 2-12 are intact, non-focal, moves all 4 extremities    Additional Data:     Labs:  Results from last 7 days   Lab Units 05/10/23  0510 05/08/23  0652 05/07/23  2345   WBC Thousand/uL 3 28*   < > 7 35   HEMOGLOBIN g/dL 9 3*   < > 9 9*   HEMATOCRIT % 30 0*   < > 32 3*   PLATELETS Thousands/uL 142*   < > 138*   NEUTROS PCT %  --   --  78*   LYMPHS PCT %  --   --  14   MONOS PCT %  --   --  8   EOS PCT %  --   --  0    < > = values in this interval not displayed  Results from last 7 days   Lab Units 05/10/23  0510 05/08/23  0652 05/07/23  2345   SODIUM mmol/L 138   < > 139   POTASSIUM mmol/L 4 2   < > 4 3   CHLORIDE mmol/L 109*   < > 107   CO2 mmol/L 28   < > 24   BUN mg/dL 25   < > 18   CREATININE mg/dL 1 98*   < > 1 57*   ANION GAP mmol/L 1*   < > 8   CALCIUM mg/dL 8 5   < > 8 5   ALBUMIN g/dL  --   --  3 8   TOTAL BILIRUBIN mg/dL  --   --  0 71   ALK PHOS U/L  --   --  61   ALT U/L  --   --  11   AST U/L  --   --  12*   GLUCOSE RANDOM mg/dL 94   < > 104    < > = values in this interval not displayed  Lines/Drains:  Invasive Devices     Peripheral Intravenous Line  Duration           Peripheral IV 05/10/23 Dorsal (posterior); Right Forearm 1 day                  Telemetry:  Telemetry Orders (From admission, onward)             24 Hour Telemetry Monitoring  Continuous x 24 Hours (Telem)        Question:  Reason for 24 Hour Telemetry  Answer:  PCI/EP study (including pacer and ICD implementation), Cardiac surgery, MI, abnormal cardiac cath, and chest pain- rule out MI                 Telemetry Reviewed: v-paced  Indication for Continued Telemetry Use: Post PCI/EP Study             Recent Cultures (last 7 days):         Last 24 Hours Medication List:   Current Facility-Administered Medications   Medication Dose Route Frequency Provider Last Rate   • acetaminophen  975 mg Oral Q8H Lidya Cortes MD     • amoxicillin  500 mg Oral TID Lidya Cortes MD     • butalbital-acetaminophen-caffeine  1 tablet Oral Q4H PRN Lidya Cortes MD     • Diclofenac Sodium  2 g Topical Q6H PRN Lidya Cortes MD     • diltiazem  120 mg Oral Daily Teresa Davila PA-C     • diphenhydrAMINE  25 mg Intravenous Q8H PRN Daniel Castillo MD     • doxazosin  2 mg Oral HS Daniel Castillo MD     • furosemide  80 mg Intravenous BID (diuretic) Daniel Castillo MD     • lidocaine  1 patch Topical Daily Dillon Potts MD     • lisinopril  20 mg Oral Daily Daniel Castillo MD     • magnesium sulfate  2 g Intravenous Q24H [address] Daniel Castillo MD 2 g (05/10/23 0909)   • melatonin  6 mg Oral HS Dillon Potts MD     • metoclopramide  10 mg Intravenous Q8H [address] Daniel Castillo MD     • metoprolol succinate  50 mg Oral BID Teresa Davila PA-C     • nicotine  1 patch Transdermal Daily Daniel Castillo MD     • nystatin   Topical BID Daniel Castillo MD     • oxyCODONE  5 mg Oral Q6H PRN Dillon Potts MD     • pantoprazole  40 mg Oral Early Morning Daniel Castillo MD     • rivaroxaban  15 mg Oral QPM Daniel Castillo MD     • trimethobenzamide  200 mg Intramuscular Q6H PRN Daniel Castillo MD          Today, Patient Was Seen By: Nae Salinas MD    **Please Note: This note may have been constructed using a voice recognition system  **

## 2023-05-11 NOTE — QUICK NOTE
Was reached out to by nursing staff concerning 9 and 10 right groin pain with some concern for area being more firm than this morning and tender to palpation  Did reassess and cannot find overt hematoma but will order stat pseudoaneurysm study to rule out hematoma, fistula, or pseudoaneurysm  Did have a pseudoaneurysm that required thrombin injection in 2020

## 2023-05-11 NOTE — PROGRESS NOTES
Progress Note - Electrophysiology  Cristi Fernandes 62 y o  female MRN: [id number]  Unit/Bed#: -01 Encounter: [id number]      Assessment:  1  Persistent atrial flutter with rapid ventricular response, now status post AV node ablation 5/10/2023              A ) known atrial fibrillation with prior cryoablation with pulmonary vein isolation 05/2020, no note of typical flutter line performed at that time              B ) amiodarone started 5/2021 when she presented with tachy-mediated cardiomyopathy and increased afib burden with breakthrough despite amiodarone antiarrhythmic therapy and cessation of this therapy stopped 4/2023 (felt to be proarrhythmic)              C ) on Xarelto anticoagulation              D )  Underwent ablation of roof flutter line and Micra reentrant atrial tachycardia by Dr Arley Milton 6/22/2022              E )  Prior inappropriate shock for atrial flutter 4/2023   F )  Rate control medication to be cut down - no diltiazem and metoprolol lowered (see #5)  2  Hypertrophic cardiomyopathy  3  Chronic HFpEF  4  Prior tachy-mediated cardiomyopathy with now recovered               M ) echo this year showing EF of 60%              B ) previously 30% per echo 5/2021 in the setting of increased burden of rapid afib              C ) no obstructive CAD per cardiac cath 1/2019  5  Prior ventricular tachycardia in 2016, status post secondary prevention [employer] dual chamber ICD   A )  Beta blocker therapy of metoprolol succinate  6  Hypertension  7  CKD 3  8  Tobacco abuse  9  Obesity with BMI 32      Plan:  Patient is status post AV node ablation yesterday  She is aware of the postop restrictions and has a work note as she does do heavy lfting at the [employer]  Incision site was evaluated and appears clean, dry, and intact without signs of ecchymosis or hematoma (did not have groin suture to remove)       She does have significant pain at the incision and is concerned as she did have issue with right "sided pseudoaneurysm in 2020  If this persists can get pseudoaneurysm study  Will order H&H  She has a follow up in our office along with the device clinic (to change low rate from 80 to 70)  She was advised that she needs to make it to these appointments and also plug in her bedside monitor so that we can track her device at home given the fact that she is dependent now  We did also discuss that if she has symptoms of shortness of breath or lower extremity edema, heart failure symptoms, that she does need to reach out to the office as we need to ensure no drop in her ejection fraction as a result of RV pacing  If this is suspected to be the case we will obtain echo  In terms of her medications, her metoprolol can be decreased to 50mg daily  Diltiazem can be discontinued as it is not needed for rate control  Subjective/Objective   Subjective: Patient did undergo AV node ablation yesterday  Has been walking the unit, says she has some dizziness with this  TELE: ventricular paced rhythm at 80 beats per minute with underlying atrial flutter    EKG:         Objective:  Vitals: /91   Pulse 80   Temp 97 6 °F (36 4 °C)   Resp 17   Ht 5' 7\" (1 702 m)   Wt 106 kg (233 lb 9 6 oz)   SpO2 97%   BMI 36 59 kg/m²     Vitals:    05/10/23 0600 05/11/23 0600   Weight: 108 kg (238 lb) 106 kg (233 lb 9 6 oz)     Orthostatic Blood Pressures    Flowsheet Row Most Recent Value   Blood Pressure 136/91 filed at 05/11/2023 2581   Patient Position - Orthostatic VS Lying filed at 05/10/2023 1107            Intake/Output Summary (Last 24 hours) at 5/11/2023 1055  Last data filed at 5/11/2023 0854  Gross per 24 hour   Intake 1410 ml   Output 200 ml   Net 1210 ml       Invasive Devices     Peripheral Intravenous Line  Duration           Peripheral IV 05/10/23 Dorsal (posterior); Right Forearm 1 day                          Scheduled Meds:  Current Facility-Administered Medications   Medication Dose Route Frequency " Provider Last Rate   • acetaminophen  975 mg Oral Q8H Barbara Damon MD     • amoxicillin  500 mg Oral TID Barbara Damon MD     • butalbital-acetaminophen-caffeine  1 tablet Oral Q4H PRN Barbara Damon MD     • Diclofenac Sodium  2 g Topical Q6H PRN Barbara Damon MD     • diltiazem  120 mg Oral Daily Teresa Davila PA-C     • diphenhydrAMINE  25 mg Intravenous Q8H PRN Barbara Damon MD     • doxazosin  2 mg Oral HS Barbara Damon MD     • furosemide  80 mg Intravenous BID (diuretic) Barbara Damon MD     • lidocaine  1 patch Topical Daily Ellie Maldonado MD     • lisinopril  20 mg Oral Daily Barbara Damon MD     • magnesium sulfate  2 g Intravenous Q24H [address] Barbara Damon MD 2 g (05/10/23 0909)   • melatonin  6 mg Oral HS Ellie Maldonado MD     • metoclopramide  10 mg Intravenous Q8H [address] Barbara Damon MD     • metoprolol succinate  50 mg Oral BID Teresa Davila PA-C     • nicotine  1 patch Transdermal Daily Barbara Damon MD     • nystatin   Topical BID Barbara Damon MD     • oxyCODONE  5 mg Oral Q6H PRN Ellie Maldonado MD     • pantoprazole  40 mg Oral Early Morning Barbara Damon MD     • rivaroxaban  15 mg Oral QPM Barbara Damon MD     • trimethobenzamide  200 mg Intramuscular Q6H PRN Barbara Damon MD       Continuous Infusions:   PRN Meds: •  butalbital-acetaminophen-caffeine  •  Diclofenac Sodium  •  diphenhydrAMINE  •  oxyCODONE  •  trimethobenzamide    Review of Systems:  ROS  ROS as noted above, otherwise 12 point review of systems was performed and is negative  Physical Exam:   Physical Exam  Vitals and nursing note reviewed  Constitutional:       General: She is not in acute distress  Appearance: She is well-developed  She is not diaphoretic  HENT:      Head: Normocephalic and atraumatic  Eyes:      Pupils: Pupils are equal, round, and reactive to light  Neck:      Vascular: No JVD  Cardiovascular:      Rate and Rhythm: Normal rate and regular rhythm  Heart sounds: No murmur heard  No friction rub  No gallop  Pulmonary:      Effort: Pulmonary effort is normal  No respiratory distress  Breath sounds: Normal breath sounds  No wheezing  Abdominal:      Palpations: Abdomen is soft  Musculoskeletal:         General: Normal range of motion  Cervical back: Normal range of motion  Skin:     General: Skin is warm and dry  Neurological:      Mental Status: She is alert and oriented to person, place, and time  Lab Results: I have personally reviewed pertinent lab results  Results from last 7 days   Lab Units 05/10/23  0510 23  0436 23  0652   WBC Thousand/uL 3 28* 3 69* 3 89*   HEMOGLOBIN g/dL 9 3* 9 1* 9 2*   HEMATOCRIT % 30 0* 30 1* 30 1*   PLATELETS Thousands/uL 142* 110* 109*     Results from last 7 days   Lab Units 05/10/23  0510 23  0436 23  0652   POTASSIUM mmol/L 4 2 4 1 3 8   CHLORIDE mmol/L 109* 107 107   CO2 mmol/L 28 25 25   BUN mg/dL 25 22 15   CREATININE mg/dL 1 98* 1 60* 1 49*   CALCIUM mg/dL 8 5 8 4 7 9*         Results from last 7 days   Lab Units 05/10/23  0510 23  0436 23  0652   MAGNESIUM mg/dL 2 6 2 3 2 6       Imaging: I have personally reviewed pertinent reports      Results for orders placed during the hospital encounter of 21    Echo complete with contrast if indicated    Narrative  [address] [place] [address]    Transthoracic Echocardiogram  2D, M-mode, Doppler, and Color Doppler    Study date:  25-May-2021    Patient: Micheline Mckeon  MR number: [id number]  Account number: [de-identified]  : 1965  Age: 64 years  Gender: Female  Status: Inpatient  Location: [facility]  Height: 67 in  Weight: 212 lb  BP: 118/ 62 mmHg    Indications: Afib    Diagnoses: I48 0 - Atrial fibrillation    Sonographer:  PRISCILLA Shetty  Referring Physician:  Lourdes Sifuentes MD  Group:  [facility]  Interpreting Physician:  Abimael Keith MD    SUMMARY    LEFT VENTRICLE:  Systolic function was moderately to markedly reduced  Ejection fraction was estimated to be 30 %  There was moderate diffuse hypokinesis  There was severe concentric hypertrophy  There was significant hypertrophy of the LV apex  RIGHT VENTRICLE:  The size was normal   Systolic function was reduced  LEFT ATRIUM:  The atrium was dilated  HISTORY: PRIOR HISTORY: Cocaine abuse, Smoker, CKD III, Congestive heart failure  Hypertrophic cardiomyopathy  Atrial fibrillation  Risk factors: hypercholesterolemia  PRIOR PROCEDURES: ICD implantation  Arrhythmia ablation  PROCEDURE: The study was performed in the [facility]  This was a routine study  The transthoracic approach was used  The study included complete 2D imaging, M-mode, complete spectral Doppler, and color Doppler  The heart rate was 138  bpm, at the start of the study  Images were obtained from the parasternal, apical, subcostal, and suprasternal notch acoustic windows  Intravenous contrast (  6 mL Definity in NSS) was administered  Echocardiographic views were limited due  to poor acoustic window availability and lung interference  This was a technically difficult study  LEFT VENTRICLE: Size was normal  Systolic function was moderately to markedly reduced  Ejection fraction was estimated to be 30 %  There was moderate diffuse hypokinesis  Wall thickness was markedly increased  There was severe concentric  hypertrophy  There was significant hypertrophy of the LV apex  DOPPLER: Due to tachycardia, there was fusion of early and atrial contributions to ventricular filling  The study was not technically sufficient to allow evaluation of LV  diastolic function  RIGHT VENTRICLE: The size was normal  Systolic function was reduced  Wall thickness was normal  A pacing wire was present  LEFT ATRIUM: The atrium was dilated  RIGHT ATRIUM: Size was normal  A pacing wire was present      MITRAL VALVE: Valve structure was normal  There was normal leaflet separation  DOPPLER: The transmitral velocity was within the normal range  There was no evidence for stenosis  There was trace regurgitation  AORTIC VALVE: The valve was trileaflet  Leaflets exhibited normal thickness and normal cuspal separation  DOPPLER: Transaortic velocity was within the normal range  There was no evidence for stenosis  There was no significant  regurgitation  TRICUSPID VALVE: The valve structure was normal  There was normal leaflet separation  DOPPLER: The transtricuspid velocity was within the normal range  There was no evidence for stenosis  There was trace regurgitation  Pulmonary artery  systolic pressure was within the normal range  Estimated peak PA pressure was 21 mmHg  PULMONIC VALVE: Leaflets exhibited normal thickness, no calcification, and normal cuspal separation  DOPPLER: The transpulmonic velocity was within the normal range  There was trace regurgitation  PERICARDIUM: There was no pericardial effusion  The pericardium was normal in appearance  AORTA: The root exhibited normal size  SYSTEMIC VEINS: IVC: The inferior vena cava was normal in size   Respirophasic changes were normal     SYSTEM MEASUREMENT TABLES    2D  %FS: 27 49 %  Ao Diam: 2 77 cm  EDV(Teich): 57 94 ml  EF(Teich): 54 26 %  ESV(Teich): 26 5 ml  IVSd: 2 46 cm  LA Area: 26 06 cm2  LA Diam: 4 27 cm  LVIDd: 3 7 cm  LVIDs: 2 68 cm  LVPWd: 1 79 cm  RA Area: 18 49 cm2  RVIDd: 3 01 cm  RWT: 0 97  SV(Teich): 31 44 ml    CW  TR Vmax: 2 14 m/s  TR maxP 28 mmHg    MM  TAPSE: 1 51 cm    [facility]    Prepared and electronically signed by    Michael Martin MD  Signed 25-May-2021 14:44:53      VTE Pharmacologic Prophylaxis: Xarelto  VTE Mechanical Prophylaxis: sequential compression device

## 2023-05-11 NOTE — PLAN OF CARE
Problem: PAIN - ADULT  Goal: Verbalizes/displays adequate comfort level or baseline comfort level  Description: Interventions:  - Encourage patient to monitor pain and request assistance  - Assess pain using appropriate pain scale  - Administer analgesics based on type and severity of pain and evaluate response  - Implement non-pharmacological measures as appropriate and evaluate response  - Consider cultural and social influences on pain and pain management  - Notify physician/advanced practitioner if interventions unsuccessful or patient reports new pain  Outcome: Progressing     Problem: Knowledge Deficit  Goal: Patient/family/caregiver demonstrates understanding of disease process, treatment plan, medications, and discharge instructions  Description: Complete learning assessment and assess knowledge base    Interventions:  - Provide teaching at level of understanding  - Provide teaching via preferred learning methods  Outcome: Progressing     Problem: CARDIOVASCULAR - ADULT  Goal: Maintains optimal cardiac output and hemodynamic stability  Description: INTERVENTIONS:  - Monitor I/O, vital signs and rhythm  - Monitor for S/S and trends of decreased cardiac output  - Administer and titrate ordered vasoactive medications to optimize hemodynamic stability  - Assess quality of pulses, skin color and temperature  - Assess for signs of decreased coronary artery perfusion  - Instruct patient to report change in severity of symptoms  Outcome: Progressing

## 2023-05-12 ENCOUNTER — APPOINTMENT (INPATIENT)
Dept: NON INVASIVE DIAGNOSTICS | Facility: HOSPITAL | Age: 58
End: 2023-05-12

## 2023-05-12 VITALS
BODY MASS INDEX: 37.02 KG/M2 | TEMPERATURE: 98.1 F | SYSTOLIC BLOOD PRESSURE: 118 MMHG | OXYGEN SATURATION: 94 % | DIASTOLIC BLOOD PRESSURE: 85 MMHG | WEIGHT: 235.89 LBS | RESPIRATION RATE: 18 BRPM | HEIGHT: 67 IN | HEART RATE: 81 BPM

## 2023-05-12 LAB
ANION GAP SERPL CALCULATED.3IONS-SCNC: 3 MMOL/L (ref 4–13)
BUN SERPL-MCNC: 24 MG/DL (ref 5–25)
CALCIUM SERPL-MCNC: 9 MG/DL (ref 8.3–10.1)
CHLORIDE SERPL-SCNC: 101 MMOL/L (ref 96–108)
CO2 SERPL-SCNC: 31 MMOL/L (ref 21–32)
CREAT SERPL-MCNC: 2.05 MG/DL (ref 0.6–1.3)
GFR SERPL CREATININE-BSD FRML MDRD: 26 ML/MIN/1.73SQ M
GLUCOSE SERPL-MCNC: 91 MG/DL (ref 65–140)
POTASSIUM SERPL-SCNC: 3.7 MMOL/L (ref 3.5–5.3)
SL CV LV EF: 65
SODIUM SERPL-SCNC: 135 MMOL/L (ref 135–147)

## 2023-05-12 RX ORDER — FUROSEMIDE 40 MG/1
40 TABLET ORAL DAILY
Status: DISCONTINUED | OUTPATIENT
Start: 2023-05-12 | End: 2023-05-12 | Stop reason: HOSPADM

## 2023-05-12 RX ORDER — POTASSIUM CHLORIDE 20 MEQ/1
20 TABLET, EXTENDED RELEASE ORAL DAILY
Qty: 30 TABLET | Refills: 0 | Status: SHIPPED | OUTPATIENT
Start: 2023-05-12 | End: 2023-05-17

## 2023-05-12 RX ORDER — NICOTINE 21 MG/24HR
1 PATCH, TRANSDERMAL 24 HOURS TRANSDERMAL DAILY
Qty: 28 PATCH | Refills: 0
Start: 2023-05-13

## 2023-05-12 RX ORDER — METOPROLOL SUCCINATE 50 MG/1
50 TABLET, EXTENDED RELEASE ORAL DAILY
Qty: 30 TABLET | Refills: 0 | Status: SHIPPED | OUTPATIENT
Start: 2023-05-13 | End: 2023-06-12

## 2023-05-12 RX ORDER — POTASSIUM CHLORIDE 20 MEQ/1
20 TABLET, EXTENDED RELEASE ORAL DAILY
Status: DISCONTINUED | OUTPATIENT
Start: 2023-05-12 | End: 2023-05-12 | Stop reason: HOSPADM

## 2023-05-12 RX ADMIN — PANTOPRAZOLE SODIUM 40 MG: 40 TABLET, DELAYED RELEASE ORAL at 05:11

## 2023-05-12 RX ADMIN — NICOTINE 1 PATCH: 21 PATCH, EXTENDED RELEASE TRANSDERMAL at 08:50

## 2023-05-12 RX ADMIN — POTASSIUM CHLORIDE 20 MEQ: 1500 TABLET, EXTENDED RELEASE ORAL at 10:23

## 2023-05-12 RX ADMIN — OXYCODONE HYDROCHLORIDE 5 MG: 5 TABLET ORAL at 08:50

## 2023-05-12 RX ADMIN — PERFLUTREN 0.8 ML/MIN: 6.52 INJECTION, SUSPENSION INTRAVENOUS at 11:00

## 2023-05-12 RX ADMIN — FUROSEMIDE 40 MG: 40 TABLET ORAL at 10:23

## 2023-05-12 RX ADMIN — METOPROLOL SUCCINATE 50 MG: 50 TABLET, EXTENDED RELEASE ORAL at 08:50

## 2023-05-12 RX ADMIN — DIPHENHYDRAMINE HYDROCHLORIDE 25 MG: 50 INJECTION INTRAMUSCULAR; INTRAVENOUS at 00:31

## 2023-05-12 RX ADMIN — AMOXICILLIN 500 MG: 500 CAPSULE ORAL at 05:11

## 2023-05-12 RX ADMIN — ACETAMINOPHEN 975 MG: 325 TABLET ORAL at 05:11

## 2023-05-12 RX ADMIN — NYSTATIN: 100000 POWDER TOPICAL at 08:50

## 2023-05-12 NOTE — DISCHARGE SUMMARY
[facility]  Discharge- Cristi Butt 1965, 62 y o  female MRN: [id number]  Unit/Bed#: -01 Encounter: [id number]  Primary Care Provider: Clare Montejo MD   Date and time admitted to hospital: 5/9/2023  4:13 PM    * Atrial flutter with RVR  Assessment & Plan  -cont Cardizem CD (dose increased prior to transfer by cardiology) and Toprol-XL  -was transiently placed on an Amiodarone infusion in the [facility] ED on arrival 5/8/23  -cont Xarelto  -Recent cardioversion last month and history of ablation in June 2022  -Transferred to the [address] [place] [address] for electrophysiology evaluation/intervention    -s/p AV Drew Junctional ablation on 5/10/23 by EP    Pancytopenia   Assessment & Plan  -counts stable, CTM    Acute on chronic diastolic CHF  Assessment & Plan  -EF of 65% last month  -Patient was diuresed with IV Lasix and now has been converted to oral Lasix   -cont Toprol-XL       Morbid obesity  Assessment & Plan  BMI of 37 19  Lifestyle/diet modifications    Tobacco abuse  Assessment & Plan  Cessation counseling  Transdermal nicotine patch on board    Essential hypertension  Assessment & Plan  Low-sodium diet  Continue Cardura/Zestril/Cardizem/Toprol    Chronic kidney disease stage 3   Assessment & Plan  Lab Results   Component Value Date    EGFR 26 05/12/2023    EGFR 26 05/11/2023    EGFR 27 05/10/2023    CREATININE 2 05 (H) 05/12/2023    CREATININE 2 04 (H) 05/11/2023    CREATININE 1 98 (H) 05/10/2023     -Baseline creatinine of approximately 1 7-2 1      History of ventricular tachycardia  Assessment & Plan  -S/p ICD  -cont Toprol XL    Gastroesophageal reflux disease   Assessment & Plan  -Continue PPI      Medical Problems     Resolved Problems  Date Reviewed: 5/12/2023   None       Discharging Physician / Practitioner: Enrique Treadwell MD  PCP: Clare Montejo MD  Admission Date:   Admission Orders (From admission, onward)     Ordered        05/10/23 1243 "Inpatient Admission  Once                      Discharge Date: 05/12/23    Consultations During Hospital Stay:  · Electrophysiology  · Cardiology    Procedures Performed:   · AV node ablation    Significant Findings / Test Results:   · Xray R wrist: No acute osseous abnormality  · CXR: No acute cardiopulmonary disease  Incidental Findings:   · None    Test Results Pending at Discharge (will require follow up): · None     Outpatient Tests Requested:  · None    Complications:  None    Reason for Admission: A flutter with RVR    Hospital Course:   Robel Henson is a 62 y o  female patient who originally presented to the hospital on 5/9/2023 due to a flutter with RVR as outlined in the H&P done on admission  Hospital course per problem list:    Please see above list of diagnoses and related plan for additional information  Condition at Discharge: good    Discharge Day Visit / Exam:   Subjective: Patient denies chest pain or shortness of breath    Vitals: Blood Pressure: 118/85 (05/12/23 0723)  Pulse: 81 (05/12/23 0723)  Temperature: 98 1 °F (36 7 °C) (05/12/23 0723)  Temp Source: Oral (05/11/23 1607)  Respirations: 18 (05/12/23 0723)  Height: 5' 7\" (170 2 cm) (05/09/23 1622)  Weight - Scale: 107 kg (235 lb 3 2 oz) (05/12/23 0600)  SpO2: 94 % (05/12/23 0723)  Exam:   Gen: remains NAD, AAOx3, well developed, well nourished  Eyes: EOMI, PERRLA, no scleral icterus  ENMT:  no nasal discharge, no otic discharge, moist mucous membranes  Neck:  Supple  Cardiovascular: continues to remain Regular rate and rhythm, normal S1-S2, no murmurs, rubs, or gallops  Lungs: continues to remain Clear to auscultation bilaterally, no wheezes, or rales, or rhonchi  Abdomen:  Positive bowel sounds, soft, nontender, nondistended, no palpable organomegaly   Skin:  Intact, no obvious lesions or rashes, no edema  Neuro: Cranial nerves 2-12 are intact, non-focal, moves all 4 extremities    Discharge instructions/Information to patient " and family:   See after visit summary for information provided to patient and family  Provisions for Follow-Up Care:  See after visit summary for information related to follow-up care and any pertinent home health orders  Disposition:   Home     Discharge Statement:  I spent 32 minutes discharging the patient  This time was spent on the day of discharge  I had direct contact with the patient on the day of discharge  Greater than 50% of the total time was spent examining patient, answering all patient questions, arranging and discussing plan of care with patient as well as directly providing post-discharge instructions  Additional time then spent on discharge activities  Discharge Medications:  See after visit summary for reconciled discharge medications provided to patient and/or family        **Please Note: This note may have been constructed using a voice recognition system**

## 2023-05-12 NOTE — PROGRESS NOTES
General Cardiology   Progress Note -  [facility] One   Merlinda Marble 62 y o  female MRN: [id number]    Unit/Bed#: -01 Encounter: [id number]    Assessment  1  Symptomatic persistent atrial flutter w/ RVR (POA)  2  Paroxsymal atrial fibrillation; s/p prior cryoablation with pulmonary vein isolation 5/2020  -EP following  -S/p AVJ ablation procedure 5/10/2023  -Telem review - v paced  -On metoprolol succinate 50 mg daily  -[id number] 4 - on Xarelto 15 mg daily  3  Acute on chronic HFpEF  4  HCM  5  Hx of tachyinduced CM (EF as low as 30% in 2021), now recovered  - BNP 1328 (previously 2056 12/20/2022)  - CT A/P 5/9/2023 - mild pulmonary edema of lung bases, trace right pleural effusion  -TTE (4/5/2023) - LVEF 65%, severe asymmetric hypertrophy of septal and apical walls (HCM), normal systolic function, RWMA cannot be excluded on the basis of the study, mild TR  -Outpatient GDMT; lisinopril 20 mg daily (on hold), and metoprolol succinate 150 mg twice daily (dose now reduced to 50 mg BID)  -Outpatient diuretic regimen - oral furosemide 40 mg daily  -S/p course of IV diuresis, discontinued on 5/11   -24 hour GRANT balance; +140; overall +1 1 L  -Dry weight reported at 234 lb; discharge weight of 235 lb (4/6/2023)    Weight this am 235 pounds, up from 233 lbs yesterday  6  History of VT s/p ICD  - MDT DC ICD (2016) by Dr Seema Villagran  - S/p ICD shock due to rapid atrial flutter; no ventricular arrhythmias on (4/4/2023)  7  Hypertension  -Average /83, last recorded at 118/85, HR 81   -Outpatient antihypertensive regimen - doxazosin 2 mg daily, lisinopril 20 mg daily, furosemide 40 mg daily, metoprolol succinate 150 mg twice daily, Cardizem  mg daily  -Inpatient BP regimen; doxazosin 2 mg daily, metoprolol succinate 50 mg daily  8  Hyperlipidemia  -Lipid panel (4/5/2023) - /triglycerides 83/HDL 40/LDL 68  -Not maintained on statin therapy outpatient  9  CKD stage III  -Baseline creatinine 1 6-1 8  -Creatinine level currently 2 05 -up from 2 04 yesterday yesterday  10  SURINDER - on CPAP at HS  11  Tobacco abuse; smoking cessation, nicotine patch     Plan  -Pt feels well this a m  without any specific cardiac complaints  Hemodynamics remain stable  She is V paced on cardiac monitoring   -Will follow up w/limited TTE    -Stable from a volume/respiratory perspective  Creatinine levels slightly above her usual baseline (currently at 2 05, usually 1 6-1 8), level has gradually risen over the past couple of days but seems to have plateaued  Her weight is up approximately 2 pounds from yesterday  Would favor restarting her usual oral furosemide 40 mg daily + K-Dur 20 meq daily   -Continue metoprolol succinate 50 mg daily  -Continue Xarelto 15 mg daily  -Hold ACEI for now    -Continue daily weights, 2gNa+ diet, and 2L FR  -Obtain BMP in 3 days after discharge  -DC telemetry    Subjective  Review of Systems   Constitutional: Negative for chills, fever and malaise/fatigue  Eyes: Negative for visual disturbance  Cardiovascular: Negative for chest pain, dyspnea on exertion, leg swelling, orthopnea and palpitations  Respiratory: Negative for cough and shortness of breath  Gastrointestinal: Negative for abdominal pain  Neurological: Negative for dizziness, headaches and light-headedness  Objective:   Physical Exam  Vitals and nursing note reviewed  Constitutional:       General: She is not in acute distress  Appearance: She is obese  She is not diaphoretic  HENT:      Head: Normocephalic and atraumatic  Eyes:      General: No scleral icterus  Cardiovascular:      Rate and Rhythm: Normal rate  Pulses: Normal pulses  Heart sounds: Normal heart sounds  Comments: V paced  Pulmonary:      Effort: Pulmonary effort is normal       Breath sounds: Normal breath sounds  No wheezing or rales  Abdominal:      Palpations: Abdomen is soft  Musculoskeletal:      Right lower leg: No edema        Left "lower leg: No edema  Skin:     General: Skin is warm and dry  Capillary Refill: Capillary refill takes less than 2 seconds  Neurological:      General: No focal deficit present  Mental Status: She is alert and oriented to person, place, and time  Psychiatric:         Mood and Affect: Mood normal          Vitals: Blood pressure 118/85, pulse 81, temperature 98 1 °F (36 7 °C), resp  rate 18, height 5' 7\" (1 702 m), weight 107 kg (235 lb 3 2 oz), SpO2 94 %, not currently breastfeeding ,     Body mass index is 36 84 kg/m²  ,   Systolic (23QBI), QWY:691 , Min:98 , MZE:177     Diastolic (00YZW), ARE:00, Min:54, Max:93      Intake/Output Summary (Last 24 hours) at 5/12/2023 0846  Last data filed at 5/12/2023 0501  Gross per 24 hour   Intake 740 ml   Output 600 ml   Net 140 ml     Weight (last 2 days)     Date/Time Weight    05/12/23 0600 107 (235 2)    05/11/23 0600 106 (233 6)    05/10/23 0600 108 (238)          LABORATORY RESULTS      CBC with diff:   Results from last 7 days   Lab Units 05/11/23  1337 05/10/23  0510 05/09/23  0436 05/08/23  0652 05/07/23  2345 05/06/23  1343   WBC Thousand/uL  --  3 28* 3 69* 3 89* 7 35 5 85   HEMOGLOBIN g/dL 11 1* 9 3* 9 1* 9 2* 9 9* 10 3*   HEMATOCRIT % 35 8 30 0* 30 1* 30 1* 32 3* 33 0*   MCV fL  --  83 84 84 84 82   PLATELETS Thousands/uL  --  142* 110* 109* 138* 149   MCH pg  --  25 8* 25 3* 25 6* 25 6* 25 7*   MCHC g/dL  --  31 0* 30 2* 30 6* 30 7* 31 2*   RDW %  --  15 5* 15 6* 15 5* 15 6* 15 5*   MPV fL  --  12 7 12 2 11 0 12 2 11 7   NRBC AUTO /100 WBCs  --   --   --   --  0 0       CMP:  Results from last 7 days   Lab Units 05/12/23  0517 05/11/23  1059 05/10/23  0510 05/09/23  0436 05/08/23  0652 05/07/23  2345 05/06/23  1343   POTASSIUM mmol/L 3 7 3 9 4 2 4 1 3 8 4 3 4 0   CHLORIDE mmol/L 101 104 109* 107 107 107 107   CO2 mmol/L 31 31 28 25 25 24 24   BUN mg/dL 24 23 25 22 15 18 22   CREATININE mg/dL 2 05* 2 04* 1 98* 1 60* 1 49* 1 57* 1 77*   CALCIUM mg/dL " 9 0 8 8 8 5 8 4 7 9* 8 5 9 0   AST U/L  --   --   --   --   --  12* 15   ALT U/L  --   --   --   --   --  11 15   ALK PHOS U/L  --   --   --   --   --  61 59   EGFR ml/min/1 73sq m 26 26 27 35 38 36 31       BMP:  Results from last 7 days   Lab Units 23  0517 23  1059 05/10/23  0510 23  0436 23  0652 23  2345 23  1343   POTASSIUM mmol/L 3 7 3 9 4 2 4 1 3 8 4 3 4 0   CHLORIDE mmol/L 101 104 109* 107 107 107 107   CO2 mmol/L 31 31 28 25 25 24 24   BUN mg/dL 24 23 25 22 15 18 22   CREATININE mg/dL 2 05* 2 04* 1 98* 1 60* 1 49* 1 57* 1 77*   CALCIUM mg/dL 9 0 8 8 8 5 8 4 7 9* 8 5 9 0       Lab Results   Component Value Date    NTBNP 9,053 (H) 2023    NTBNP 5,423 (H) 2023    NTBNP 16,909 (H) 2022        Results from last 7 days   Lab Units 05/10/23  0510 23  0436 23  0652 23  2345   MAGNESIUM mg/dL 2 6 2 3 2 6 1 9                         Lipid Profile:   No results found for: CHOL  Lab Results   Component Value Date    HDL 40 (L) 2023    HDL 42 (L) 2021    HDL 43 2020     Lab Results   Component Value Date    LDLCALC 68 2023    LDLCALC 57 2021    LDLCALC 72 2020     Lab Results   Component Value Date    TRIG 83 2023    TRIG 80 9 2021    TRIG 89 2020       Cardiac testing:   Results for orders placed during the hospital encounter of 21    Echo complete with contrast if indicated    Narrative  [facility] [place] [address]    Transthoracic Echocardiogram  2D, M-mode, Doppler, and Color Doppler    Study date:  25-May-2021    Patient: Charley Sena  MR number: [id number]  Account number: [de-identified]  : 1965  Age: 64 years  Gender: Female  Status: Inpatient  Location: [facility]  Height: 67 in  Weight: 212 lb  BP: 118/ 62 mmHg    Indications: Afib    Diagnoses: I48 0 - Atrial fibrillation    Sonographer:  Meme Merida, [facility]  Referring Physician:  Chacho Estrada MD  Group:  [facility]  Interpreting Physician:  Veronica Guzman MD    SUMMARY    LEFT VENTRICLE:  Systolic function was moderately to markedly reduced  Ejection fraction was estimated to be 30 %  There was moderate diffuse hypokinesis  There was severe concentric hypertrophy  There was significant hypertrophy of the LV apex  RIGHT VENTRICLE:  The size was normal   Systolic function was reduced  LEFT ATRIUM:  The atrium was dilated  HISTORY: PRIOR HISTORY: Cocaine abuse, Smoker, CKD III, Congestive heart failure  Hypertrophic cardiomyopathy  Atrial fibrillation  Risk factors: hypercholesterolemia  PRIOR PROCEDURES: ICD implantation  Arrhythmia ablation  PROCEDURE: The study was performed in the [facility]  This was a routine study  The transthoracic approach was used  The study included complete 2D imaging, M-mode, complete spectral Doppler, and color Doppler  The heart rate was 138  bpm, at the start of the study  Images were obtained from the parasternal, apical, subcostal, and suprasternal notch acoustic windows  Intravenous contrast (  6 mL Definity in NSS) was administered  Echocardiographic views were limited due  to poor acoustic window availability and lung interference  This was a technically difficult study  LEFT VENTRICLE: Size was normal  Systolic function was moderately to markedly reduced  Ejection fraction was estimated to be 30 %  There was moderate diffuse hypokinesis  Wall thickness was markedly increased  There was severe concentric  hypertrophy  There was significant hypertrophy of the LV apex  DOPPLER: Due to tachycardia, there was fusion of early and atrial contributions to ventricular filling  The study was not technically sufficient to allow evaluation of LV  diastolic function  RIGHT VENTRICLE: The size was normal  Systolic function was reduced   Wall thickness was normal  A pacing wire was present  LEFT ATRIUM: The atrium was dilated  RIGHT ATRIUM: Size was normal  A pacing wire was present  MITRAL VALVE: Valve structure was normal  There was normal leaflet separation  DOPPLER: The transmitral velocity was within the normal range  There was no evidence for stenosis  There was trace regurgitation  AORTIC VALVE: The valve was trileaflet  Leaflets exhibited normal thickness and normal cuspal separation  DOPPLER: Transaortic velocity was within the normal range  There was no evidence for stenosis  There was no significant  regurgitation  TRICUSPID VALVE: The valve structure was normal  There was normal leaflet separation  DOPPLER: The transtricuspid velocity was within the normal range  There was no evidence for stenosis  There was trace regurgitation  Pulmonary artery  systolic pressure was within the normal range  Estimated peak PA pressure was 21 mmHg  PULMONIC VALVE: Leaflets exhibited normal thickness, no calcification, and normal cuspal separation  DOPPLER: The transpulmonic velocity was within the normal range  There was trace regurgitation  PERICARDIUM: There was no pericardial effusion  The pericardium was normal in appearance  AORTA: The root exhibited normal size  SYSTEMIC VEINS: IVC: The inferior vena cava was normal in size   Respirophasic changes were normal     SYSTEM MEASUREMENT TABLES    2D  %FS: 27 49 %  Ao Diam: 2 77 cm  EDV(Teich): 57 94 ml  EF(Teich): 54 26 %  ESV(Teich): 26 5 ml  IVSd: 2 46 cm  LA Area: 26 06 cm2  LA Diam: 4 27 cm  LVIDd: 3 7 cm  LVIDs: 2 68 cm  LVPWd: 1 79 cm  RA Area: 18 49 cm2  RVIDd: 3 01 cm  RWT: 0 97  SV(Teich): 31 44 ml    CW  TR Vmax: 2 14 m/s  TR maxP 28 mmHg    MM  TAPSE: 1 51 cm    [facility] Accredited Echocardiography Laboratory    Prepared and electronically signed by    Moses Boyd MD  Signed [id number] 14:44:53    Results for orders placed during the hospital encounter of 20    HUBER    Narrative  [address]  [facility], [address] [facility]  [phone]    Transesophageal Echocardiogram  2D, Doppler, and Color Doppler    Study date:  20-May-2020    Patient: Arturo Hendrix  MR number: [id number]  Account number: [de-identified]  : 1965  Age: 54 years  Gender: Female  Status: Outpatient  Location: Cath lab  Height: 67 in  Weight: 221 lb  BP:    Indications: AFIB    Diagnoses: I48 0 - Atrial fibrillation    Sonographer:  PRISCILLA Borrego  Interpreting Physician:  Galilea Barahona MD  Primary Physician:  Modesto Giron MD  Referring Physician:  Galilea Barahona MD  Group:  [facility]    SUMMARY    LEFT VENTRICLE:  Systolic function was normal  Ejection fraction was estimated to be 60 %  Wall thickness was moderately increased  There was moderate concentric hypertrophy  LEFT ATRIUM:  The atrium was mildly dilated  LEFT ATRIAL APPENDAGE:  The size was normal   The function was normal (normal emptying velocity)  No thrombus was identified  ATRIAL SEPTUM:  No defect or patent foramen ovale was identified  HISTORY: PRIOR HISTORY: AFIB, PPM, MI, HOCM, HTN, HLD, CKD III, Smoker, Cocaine abuse    PROCEDURE: The procedure was performed in the catheterization laboratory  This was a routine study  The risks and alternatives of the procedure were explained to the patient and informed consent was obtained  The transesophageal approach  was used  The study included complete 2D imaging, complete spectral Doppler, and color Doppler  An adult omniplane probe was inserted by the attending cardiologist  Intubated with ease  One intubation attempt(s)  There was no blood  detected on the probe  Image quality was adequate  MEDICATIONS: Anesthesia  LEFT VENTRICLE: Size was normal  Systolic function was normal  Ejection fraction was estimated to be 60 %  Wall thickness was moderately increased   There was moderate "concentric hypertrophy  RIGHT VENTRICLE: The size was normal  Systolic function was normal  Wall thickness was normal  A pacing wire was present  LEFT ATRIUM: The atrium was mildly dilated  No mass was present  There was no spontaneous echo contrast (\"smoke\")  APPENDAGE: The size was normal  No thrombus was identified  DOPPLER: The function was normal (normal emptying velocity)  ATRIAL SEPTUM: No defect or patent foramen ovale was identified  RIGHT ATRIUM: Size was normal  No thrombus was identified  MITRAL VALVE: Valve structure was normal  There was normal leaflet separation  There was no echocardiographic evidence of vegetation  DOPPLER: There was no regurgitation  AORTIC VALVE: The valve was trileaflet  Leaflets exhibited normal thickness and normal cuspal separation  There was no echocardiographic evidence of vegetation  DOPPLER: There was no regurgitation  TRICUSPID VALVE: The valve structure was normal  There was normal leaflet separation  There was no echocardiographic evidence of vegetation  DOPPLER: There was no regurgitation  PERICARDIUM: There was no pericardial effusion  The pericardium was normal in appearance  [address] Accredited Echocardiography Laboratory    Prepared and electronically signed by    Maeve Coelho MD  Signed [id number] 09:25:57    No results found for this or any previous visit  No valid procedures specified  No results found for this or any previous visit        Meds/Allergies   all current active meds have been reviewed and current meds:   Current Facility-Administered Medications   Medication Dose Route Frequency   • acetaminophen (TYLENOL) tablet 975 mg  975 mg Oral Q8H   • amoxicillin (AMOXIL) capsule 500 mg  500 mg Oral TID   • butalbital-acetaminophen-caffeine (FIORICET,ESGIC) -40 mg per tablet 1 tablet  1 tablet Oral Q4H PRN   • Diclofenac Sodium (VOLTAREN) 1 % topical gel 2 g  2 g Topical Q6H PRN   • diphenhydrAMINE " (BENADRYL) injection 25 mg  25 mg Intravenous Q8H PRN   • doxazosin (CARDURA) tablet 2 mg  2 mg Oral HS   • lidocaine (LIDODERM) 5 % patch 1 patch  1 patch Topical Daily   • melatonin tablet 6 mg  6 mg Oral HS   • metoclopramide (REGLAN) injection 10 mg  10 mg Intravenous Q8H [address]   • metoprolol succinate (TOPROL-XL) 24 hr tablet 50 mg  50 mg Oral Daily   • nicotine (NICODERM CQ) 21 mg/24 hr TD 24 hr patch 1 patch  1 patch Transdermal Daily   • nystatin (MYCOSTATIN) powder   Topical BID   • oxyCODONE (ROXICODONE) IR tablet 5 mg  5 mg Oral Q6H PRN   • pantoprazole (PROTONIX) EC tablet 40 mg  40 mg Oral Early Morning   • rivaroxaban (XARELTO) tablet 15 mg  15 mg Oral QPM   • trimethobenzamide (TIGAN) IM injection 200 mg  200 mg Intramuscular Q6H PRN            EKG personally reviewed by CHARLOTTE Peacock  Assessment:  Principal Problem:    Atrial flutter with RVR  Active Problems:    Gastroesophageal reflux disease     History of ventricular tachycardia    Tobacco abuse    Acute on chronic diastolic CHF    Chronic kidney disease stage 3     Essential hypertension    Morbid obesity    Pancytopenia     Counseling / Coordination of Care  Total floor / unit time spent today 20 minutes  Greater than 50% of total time was spent with the patient and / or family counseling and / or coordination of care  ** Please Note: Dragon 360 Dictation voice to text software may have been used in the creation of this document   **

## 2023-05-12 NOTE — QUICK NOTE
Patient seen and reevaluated at bedside today over concerns for 9 out of 10 right groin pain  Patient states that right groin pain is significantly improved from yesterday and is currently at a 2 out of 10  Right side was evaluated and does not show any evidence of hematoma, residual bleeding, or other complications  Pseudoaneurysm study from yesterday reviewed with no evidence of pseudoaneurysm  Patient is clear from EP perspective for discharge today

## 2023-05-12 NOTE — ASSESSMENT & PLAN NOTE
-EF of 65% last month  -Patient was diuresed with IV Lasix and now has been converted to oral Lasix   -cont Toprol-XL

## 2023-05-12 NOTE — ASSESSMENT & PLAN NOTE
-cont Cardizem CD (dose increased prior to transfer by cardiology) and Toprol-XL  -was transiently placed on an Amiodarone infusion in the [address] [place] [address] ED on arrival 5/8/23  -cont Xarelto  -Recent cardioversion last month and history of ablation in June 2022  -Transferred to the [address] [place] [address] for electrophysiology evaluation/intervention    -s/p AV Drew Junctional ablation on 5/10/23 by EP

## 2023-05-12 NOTE — ASSESSMENT & PLAN NOTE
Lab Results   Component Value Date    EGFR 26 05/12/2023    EGFR 26 05/11/2023    EGFR 27 05/10/2023    CREATININE 2 05 (H) 05/12/2023    CREATININE 2 04 (H) 05/11/2023    CREATININE 1 98 (H) 05/10/2023     -Baseline creatinine of approximately 1 7-2 1

## 2023-05-15 ENCOUNTER — TELEPHONE (OUTPATIENT)
Dept: NEPHROLOGY | Facility: CLINIC | Age: 58
End: 2023-05-15

## 2023-05-15 NOTE — UTILIZATION REVIEW
NOTIFICATION OF ADMISSION DISCHARGE   This is a Notification of Discharge from [address]  Please be advised that this patient has been discharge from our facility  Below you will find the admission and discharge date and time including the patient’s disposition  UTILIZATION REVIEW CONTACT:  Fredrick Shaikh  Utilization   Network Utilization Review Department  Phone: [phone] x carefully listen to the prompts  All voicemails are confidential   Email: [email or web address]     ADMISSION INFORMATION  PRESENTATION DATE: 5/9/2023  4:13 PM  OBERVATION ADMISSION DATE:   INPATIENT ADMISSION DATE: 5/9/23  4:13 PM   DISCHARGE DATE: 5/12/2023 12:35 PM   DISPOSITION:Home/Self Care    IMPORTANT INFORMATION:  Send all requests for admission clinical reviews, approved or denied determinations and any other requests to dedicated fax number below belonging to the campus where the patient is receiving treatment   List of dedicated fax numbers:  [address] DENIALS (Administrative/Medical Necessity) [phone]   [address] (Maternity/NICU/Pediatrics) [phone]   [facility] [phone]   [facility] [address] [phone]   [address] [phone]   [address] [facility] [phone] 90 [facility] [phone]   [address] [place], [place] [address] [address] [phone]   [facility]  [phone] 405[address] [facility] [phone]   412 [facility] [address] [facility] [phone]

## 2023-05-16 ENCOUNTER — APPOINTMENT (OUTPATIENT)
Dept: LAB | Facility: CLINIC | Age: 58
End: 2023-05-16

## 2023-05-16 DIAGNOSIS — N18.32 STAGE 3B CHRONIC KIDNEY DISEASE (HCC): ICD-10-CM

## 2023-05-16 DIAGNOSIS — N18.30 BENIGN HYPERTENSION WITH CKD (CHRONIC KIDNEY DISEASE) STAGE III (HCC): ICD-10-CM

## 2023-05-16 DIAGNOSIS — I12.9 BENIGN HYPERTENSION WITH CKD (CHRONIC KIDNEY DISEASE) STAGE III (HCC): ICD-10-CM

## 2023-05-16 LAB
ANION GAP SERPL CALCULATED.3IONS-SCNC: 4 MMOL/L (ref 4–13)
BUN SERPL-MCNC: 18 MG/DL (ref 5–25)
CALCIUM SERPL-MCNC: 9.1 MG/DL (ref 8.4–10.2)
CHLORIDE SERPL-SCNC: 105 MMOL/L (ref 96–108)
CO2 SERPL-SCNC: 29 MMOL/L (ref 21–32)
CREAT SERPL-MCNC: 2.14 MG/DL (ref 0.6–1.3)
CREAT UR-MCNC: 206 MG/DL
ERYTHROCYTE [DISTWIDTH] IN BLOOD BY AUTOMATED COUNT: 15.3 % (ref 11.6–15.1)
GFR SERPL CREATININE-BSD FRML MDRD: 24 ML/MIN/1.73SQ M
GLUCOSE SERPL-MCNC: 106 MG/DL (ref 65–140)
HCT VFR BLD AUTO: 35.2 % (ref 34.8–46.1)
HGB BLD-MCNC: 10.4 G/DL (ref 11.5–15.4)
MAGNESIUM SERPL-MCNC: 2.3 MG/DL (ref 1.9–2.7)
MCH RBC QN AUTO: 24.9 PG (ref 26.8–34.3)
MCHC RBC AUTO-ENTMCNC: 29.5 G/DL (ref 31.4–37.4)
MCV RBC AUTO: 84 FL (ref 82–98)
MICROALBUMIN UR-MCNC: 19.8 MG/L (ref 0–20)
MICROALBUMIN/CREAT 24H UR: 10 MG/G CREATININE (ref 0–30)
PHOSPHATE SERPL-MCNC: 3.1 MG/DL (ref 2.7–4.5)
PLATELET # BLD AUTO: 183 THOUSANDS/UL (ref 149–390)
PMV BLD AUTO: 10.9 FL (ref 8.9–12.7)
POTASSIUM SERPL-SCNC: 4.9 MMOL/L (ref 3.5–5.3)
PTH-INTACT SERPL-MCNC: 47.9 PG/ML (ref 12–88)
RBC # BLD AUTO: 4.17 MILLION/UL (ref 3.81–5.12)
SODIUM SERPL-SCNC: 138 MMOL/L (ref 135–147)
WBC # BLD AUTO: 5.53 THOUSAND/UL (ref 4.31–10.16)

## 2023-05-17 ENCOUNTER — OFFICE VISIT (OUTPATIENT)
Dept: CARDIOLOGY CLINIC | Facility: CLINIC | Age: 58
End: 2023-05-17

## 2023-05-17 VITALS
HEART RATE: 79 BPM | HEIGHT: 67 IN | DIASTOLIC BLOOD PRESSURE: 90 MMHG | SYSTOLIC BLOOD PRESSURE: 140 MMHG | WEIGHT: 242.5 LBS | OXYGEN SATURATION: 99 % | BODY MASS INDEX: 38.06 KG/M2

## 2023-05-17 DIAGNOSIS — T50.2X5A DIURETIC-INDUCED HYPOKALEMIA: ICD-10-CM

## 2023-05-17 DIAGNOSIS — Z09 HOSPITAL DISCHARGE FOLLOW-UP: Primary | ICD-10-CM

## 2023-05-17 DIAGNOSIS — E87.6 DIURETIC-INDUCED HYPOKALEMIA: ICD-10-CM

## 2023-05-17 DIAGNOSIS — N18.4 STAGE 4 CHRONIC KIDNEY DISEASE (HCC): ICD-10-CM

## 2023-05-17 DIAGNOSIS — R00.0 TACHYCARDIA INDUCED CARDIOMYOPATHY (HCC): ICD-10-CM

## 2023-05-17 DIAGNOSIS — I48.4 ATYPICAL ATRIAL FLUTTER (HCC): ICD-10-CM

## 2023-05-17 DIAGNOSIS — I50.30 HEART FAILURE WITH LEFT VENTRICULAR EJECTION FRACTION GREATER THAN OR EQUAL TO 50 PERCENT (HCC): ICD-10-CM

## 2023-05-17 DIAGNOSIS — I43 TACHYCARDIA INDUCED CARDIOMYOPATHY (HCC): ICD-10-CM

## 2023-05-17 PROBLEM — I21.4 NSTEMI (NON-ST ELEVATED MYOCARDIAL INFARCTION) (HCC): Status: RESOLVED | Noted: 2018-12-26 | Resolved: 2023-05-17

## 2023-05-17 PROBLEM — R10.816 EPIGASTRIC ABDOMINAL TENDERNESS: Status: RESOLVED | Noted: 2021-08-15 | Resolved: 2023-05-17

## 2023-05-17 PROBLEM — Z45.02 ICD (IMPLANTABLE CARDIOVERTER-DEFIBRILLATOR) DISCHARGE: Status: RESOLVED | Noted: 2023-04-01 | Resolved: 2023-05-17

## 2023-05-17 PROBLEM — I50.33 ACUTE ON CHRONIC DIASTOLIC (CONGESTIVE) HEART FAILURE (HCC): Status: RESOLVED | Noted: 2019-01-23 | Resolved: 2023-05-17

## 2023-05-17 PROBLEM — Z45.02 ICD (IMPLANTABLE CARDIOVERTER-DEFIBRILLATOR) DISCHARGE: Status: ACTIVE | Noted: 2023-04-01

## 2023-05-17 PROBLEM — I42.8 TACHYCARDIA INDUCED CARDIOMYOPATHY (HCC): Status: ACTIVE | Noted: 2021-05-25

## 2023-05-17 RX ORDER — FUROSEMIDE 40 MG/1
40 TABLET ORAL DAILY
Qty: 90 TABLET | Refills: 1 | Status: SHIPPED | OUTPATIENT
Start: 2023-05-17

## 2023-05-17 RX ORDER — POTASSIUM CHLORIDE 750 MG/1
20 TABLET, EXTENDED RELEASE ORAL DAILY
Qty: 60 TABLET | Refills: 1 | Status: SHIPPED | OUTPATIENT
Start: 2023-05-17

## 2023-05-17 NOTE — PROGRESS NOTES
"General Cardiology Outpatient Progress Note    Gucci Flores 62 y o  female   MRN: [id number]  Encounter: [id number]    Assessment:  Patient Active Problem List    Diagnosis Date Noted   • Paroxysmal A-fib ([place] [address] ) 05/14/2020   • Cocaine abuse ([place] [address] ) 05/14/2020   • Elevated lipase 10/10/2019   • Tobacco abuse 12/26/2018   • History of monomorphic ventricular tachycardia, s/p ICD in 2016 07/13/2016   • Gastroesophageal reflux disease  03/20/2016   • Abnormal finding on CT scan 05/08/2023   • Morbid obesity 01/14/2023   • Pancytopenia  01/14/2023   • Left low back pain 11/05/2022   • Chronic heart failure with preserved ejection fraction (HFpEF) ([place] [address] ) 10/13/2022   • Renal cyst 08/12/2022   • Diverticulitis of large intestine without perforation or abscess 06/23/2022   • Leucopenia 06/18/2022   • Nephrolithiasis 06/09/2022   • Essential hypertension 04/08/2022   • Chronic kidney disease stage 3  01/31/2022   • Chronic hepatitis C without hepatic coma ([place] [address] ) 01/30/2022   • Acute right flank pain 08/15/2021   • History of tachycardia induced cardiomyopathy 05/25/2021   • Headache 12/26/2018   • Atypical atrial flutter ([place] [address] ) 03/20/2016       Today's Plan:  • Has only been taking Lasix 20 mg daily since returning home  Currently 15-20 lbs up from reported dry weight (220-225 lbs)  Advised to increase Lasix to 40 mg daily  • Check BMP in 5-10 days  • Will have office HF RN contact patient next week for update on symptoms and weight trends  • Unable to tolerate/swallow 20 mEq tablets of potassium  Prescription sent for 10 mEq tablets to her pharmacy  • RBBB with wide QRS on EKG in office today s/p AVN ablation  Will need to closely monitor pacing percentage/LVEF as patient currently has dual chamber ICD  • Reviewed CT finding of \"2 mm nonobstructive calculus in the right kidney\" from 05/08/2023 scan which may correlate with her interrmitent right flank pain   Advised to avoid NSAIDs for pain management and to follow-up " with her PCP if pain worsens in frequency, quality, or severity  Plan:  History of atrial flutter / atrial fibrillation    [id number] = 3 (sex, HF, HTN)  Anticoagulation on Xarelto  S/p unspecified ablation at [facility] in 2015  S/p Afib ablation with PVI in 05/2020  S/p atypical flutter and micro-reentrant atrial tachycardia ablation in 06/2022  S/p AV node ablation with ICD reprogramming on 05/10/2023  Rate control: metoprolol succinate 50 mg daily  Rhythm control: None  Chronic HFrEF, now recovered; LVEF 65%; LVIDd 3 6 cm; NYHA II; ACC/AHA Stage B   Etiology: nonischemic; tachy-mediated in 05/2021 (LVEF 30%) and again in 06/2022 (LVEF 40-45%) in setting of rapid Aflutter  TTE 03/2016: LVEF 60-65%  [facility] 01/07/2019: no obstructive CAD  TTE 04/18/2019: LVEF 65%  TTE 09/18/2020: LVEF 65%  TTE 05/25/2021: LVEF 30%  LVIDd 3 7 cm  Reduced RVSF  Trace MR and TR  TTE 01/31/2022: LVEF 55%  LVIDd 4 8 cm  Grade 2 DD  Mildly reduced RVSF  TTE (limited) 06/19/2022: LVEF 40-45%  LVIDd 4 1 cm     TTE (limited) 07/28/2022: LVEF 55%  Normal RV  TTE 04/05/2023: LVEF 65%  LVIDd 3 6 cm  Normal RV  Mild TR     TTE (limited) 05/12/2023: LVEF 65%  Normal RV  Weight of 235 lbs on 05/12 (day of discharge)  Today, weighs 242 lbs  Most recent BMP from 05/16/2023: sodium 138; potassium 4 9; BUN 18; creatinine 2 14; eGFR 24  Pharmacotherapies / Neurohormonal Blockade:  --Beta Blocker: BB as above  --ARNi / ACEi / ARB: No, CKD precludes  --Aldosterone Antagonist: No, CKD precludes  --SGLT2 Inhibitor: No, CKD precludes  --Diuretic: Lasix 40 mg daily with potassium 20 mEq daily  Sudden Cardiac Death Risk Reduction:  --[employer] dual chamber ICD in situ since 07/2016  --Interrogation from 12/2022: AP 88 5%   0 8%  Lead parameters WNL  AF burden 15 1%  OptiVol WNL  Normal device function  Electrical Resynchronization:  --Candidacy for BiV device: RBBB with  ms  "    Hypertension   BP of 140/90 mmHg in office today  Continue on medications as above  Chronic kidney disease, stage IV   Baseline creatinine of 1 7-2 1  Most recent BMP from 05/16/2023: sodium 138; potassium 4 9; BUN 18; creatinine 2 14; eGFR 24  Follows with Dr Rachel Will as outpatient  History of monomorphic ventricular tachycardia: noted on outpatient loop recorder; s/p secondary prevention ICD in 07/2016  Obstructive sleep apnea  Tobacco abuse: currently smoking 0 5 ppd  History of cocaine use: last used in 03/2023  Marijuana use: smoking \"1 drag of a joint\" at night to help her sleep  HPI:   Antonio Salinas is a 43-year-old woman with a PMH as above who presents to the office for hospital follow-up  Follows with Dr Toshia Vaughan  Presented to [facility] on [facility]07[facility] with frequent palpitations  Found to be in rapid atrial flutter in ED, started on amiodarone drip, and cardiology consulted  IV Lasix was started, and amiodarone switched to PO diltiazem (due to prior concern that amiodarone may be pro-arrhythmic for patient)  Transferred to [facility] on 05/09 for EP evaluation  Seen by EP and underwent AVN ablation with ICD reprogramming on 05/10/2023  Transitioned to PO Lasix on 05/12  Lost 6 lbs this admission  05/17/2023: Patient presents with her  for hospital follow-up  Up 7 lbs since discharge  Reports her normal weight prior to her hospitalization was around 322 lbs (confirmed in Epic)  With this weight gain, she reports new ABDUL after walking about 1/2 block as well as hand swelling (symptom she has had before when overloaded)  Has chronic mild orthopnea, and denies PND  Reports at time of admission was only able to walk a few steps before feeling winded  One month ago, was walking and active with no limitations  Has only been taking 20 mg Lasix daily (rather than 40 mg) - was using tablets from a prior prescription   Unable to tolerate potassium pills - has been " vomiting them up soon after taking  Has good appetite with occasional early satiety  Recent meals included chicken pot pie (at restaurant on Mother's Day) and salads with ranch or Western Geraldine dressing  Does not add salt to foods; uses Mrs Matos only  Past Medical History:   Diagnosis Date   • ACS (acute coronary syndrome) ([place] [address] ) 02/07/2021   • Acute on chronic diastolic CHF 44/98/6393   • Arthritis    • Atrial fibrillation (HCC)    • Atrial fibrillation with rapid ventricular response ([place] [address] ) 03/20/2016   • Breast lump    • CKD (chronic kidney disease) stage 3, GFR 30-59 ml/min ([facility])    • Disease of thyroid gland    • Elevated troponin 09/09/2019   • Epigastric abdominal tenderness 08/15/2021   • 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 ([place] [address] ) 05/25/2020   • GERD (gastroesophageal reflux disease)    • H/O transfusion 1987   • Hepatitis C     resolved   • History of tachycardia induced cardiomyopathy 05/2021    in setting of rapid atrial flutter in 05/2021 and again 06/2022   • History of ventricular tachycardia 07/2016    s/p ICD   • Hyperlipidemia    • Hypertension    • Inappropriate ICD discharge for atrial flutter 04/2023   • NSTEMI (non-ST elevated myocardial infarction) ([place] [address] ) 12/26/2018   • Sleep apnea     no cpap       Review of Systems   Constitutional: Positive for appetite change and unexpected weight change  Negative for activity change and fatigue  HENT: Negative for congestion, rhinorrhea and sneezing  Eyes: Negative  Respiratory: Positive for shortness of breath  Negative for cough and chest tightness  Cardiovascular: Negative for chest pain, palpitations and leg swelling  Gastrointestinal: Negative for abdominal distention, abdominal pain, diarrhea and nausea  Endocrine: Negative  Genitourinary: Negative for decreased urine volume, difficulty urinating, dysuria and urgency  Musculoskeletal: Negative  Skin: Negative  Allergic/Immunologic: Negative  Neurological: Negative for dizziness, syncope, weakness and light-headedness  Psychiatric/Behavioral: Negative for confusion and sleep disturbance  The patient is not nervous/anxious  12-point ROS completed and negative except as stated above and/or in the HPI  Allergies   Allergen Reactions   • Coconut Oil - Food Allergy Hives   • Iodinated Contrast Media Hives   • Tape  [Medical Tape] Hives         Current Outpatient Medications:   •  acetaminophen (TYLENOL) 500 mg tablet, Take 2 tablets (1,000 mg total) by mouth every 6 (six) hours as needed for mild pain for up to 20 doses, Disp: 40 tablet, Rfl: 0  •  Diclofenac Sodium (VOLTAREN) 1 %, Apply 2 g topically every 6 (six) hours as needed (pain), Disp: 100 g, Rfl: 0  •  doxazosin (CARDURA) 2 mg tablet, take 1 tablet by mouth daily at bedtime, Disp: 30 tablet, Rfl: 5  •  furosemide (LASIX) 40 mg tablet, Take 1 tablet (40 mg total) by mouth daily, Disp: 90 tablet, Rfl: 1  •  metoprolol succinate (TOPROL-XL) 50 mg 24 hr tablet, Take 1 tablet (50 mg total) by mouth daily Do not start before May 13, 2023 , Disp: 30 tablet, Rfl: 0  •  pantoprazole (PROTONIX) 40 mg tablet, take 1 tablet by mouth once daily, Disp: 30 tablet, Rfl: 2  •  potassium chloride (K-DUR,KLOR-CON) 10 mEq tablet, Take 2 tablets (20 mEq total) by mouth daily, Disp: 60 tablet, Rfl: 1  •  rivaroxaban (XARELTO) 15 mg tablet, Take 1 tablet (15 mg total) by mouth every evening, Disp: 30 tablet, Rfl: 11  •  nicotine (NICODERM CQ) 21 mg/24 hr TD 24 hr patch, Place 1 patch on the skin over 24 hours daily Do not start before May 13, 2023   (Patient not taking: Reported on 5/17/2023), Disp: 28 patch, Rfl: 0  •  nystatin powder, apply topically to affected area twice a day, Disp: , Rfl:     Social History     Socioeconomic History   • Marital status: /Civil Union     Spouse name: Not on file   • Number of children: Not on file   • Years of education: 15   • Highest education level: Not on file "  Occupational History   • Not on file   Tobacco Use   • Smoking status: Every Day     Packs/day: 0 50     Years: 35 00     Pack years: 17 50     Types: Cigarettes   • Smokeless tobacco: Never   Vaping Use   • Vaping Use: Never used   Substance and Sexual Activity   • Alcohol use: Not Currently     Comment: occassionally   • Drug use: Yes     Types: Marijuana, Cocaine     Comment: Last used cocaine in 03/2023  Currently smoking \"1 drag of a joint\" at night to help her sleep (Updated 05/17/2023)  • Sexual activity: Yes     Partners: Male     Birth control/protection: None   Other Topics Concern   • Not on file   Social History Narrative    Disabled        · Do you currently or have you served in the [employer]:   No      · Were you activated, into active duty, as a member of the [employer] or as a Reservist:   No      · Diet:   Regular      · General stress level:   High      · Has smoked since age:   12      · Caffeine intake: Moderate      · Guns present in home:   No      · Seat belts used routinely:   Yes      · Sunscreen used routinely:   No      · Advance directive:   No      · Live alone or with others:   with others      · International travel:   no      · Pets:   No      · Blind or serious difficulty seeing: Yes     · Difficulty concentrating, remembering or making decisions:   No      · Difficulty walking or climbing stairs:    Yes      · Difficulty dressing or bathing:   No      · Difficulty doing errands alone:   No      · What is the highest grade or level of school you have completed or the highest degree you have received:   12th grade, no diploma      · How many days of moderate to strenuous exercise, like a brisk walk, did you do in the last 7 days:   0      · How hard is it for you to pay for the very basics like food, housing, medical care, and heating:   Somewhat hard      · Do you feel stress - tense, restless, nervous, or anxious, or unable to sleep at night because your mind is " "troubled all the time - these days: Only a little          Social Determinants of Health     Financial Resource Strain: Not on file   Food Insecurity: Food Insecurity Present   • Worried About [address] in the Last Year: Sometimes true   • Ran Out of Food in the Last Year: Sometimes true   Transportation Needs: No Transportation Needs   • Lack of Transportation (Medical): No   • Lack of Transportation (Non-Medical): No   Physical Activity: Not on file   Stress: Not on file   Social Connections: Not on file   Intimate Partner Violence: Not on file   Housing Stability: Low Risk    • Unable to Pay for Housing in the Last Year: No   • Number of Places Lived in the Last Year: 1   • Unstable Housing in the Last Year: No     Family History   Problem Relation Age of Onset   • Arthritis Family    • Cancer Family    • Diabetes Family    • Hypertension Family    • Cancer Maternal Grandmother        Vitals:   Blood pressure 140/90, pulse 79, height 5' 7\" (1 702 m), weight 110 kg (242 lb 8 oz), SpO2 99 %, not currently breastfeeding  Body mass index is 37 98 kg/m²  Wt Readings from Last 10 Encounters:   05/17/23 110 kg (242 lb 8 oz)   05/12/23 107 kg (235 lb 14 3 oz)   05/09/23 108 kg (238 lb 12 1 oz)   05/06/23 101 kg (222 lb)   04/17/23 108 kg (237 lb)   04/05/23 107 kg (235 lb)   02/10/23 107 kg (234 lb 12 8 oz)   01/19/23 108 kg (238 lb)   01/17/23 107 kg (235 lb)   01/16/23 107 kg (235 lb 12 8 oz)     Vitals:    05/17/23 1539   BP: 140/90   BP Location: Left arm   Patient Position: Sitting   Cuff Size: Large   Pulse: 79   SpO2: 99%   Weight: 110 kg (242 lb 8 oz)   Height: 5' 7\" (1 702 m)       Physical Exam  Vitals reviewed  Constitutional:       General: She is awake  She is not in acute distress  Appearance: Normal appearance  She is well-developed and overweight  She is not toxic-appearing or diaphoretic  HENT:      Head: Normocephalic        Nose: Nose normal       Mouth/Throat:      Mouth: " Mucous membranes are moist    Eyes:      General: No scleral icterus  Conjunctiva/sclera: Conjunctivae normal    Neck:      Vascular: JVD present  Trachea: No tracheal deviation  Cardiovascular:      Rate and Rhythm: Normal rate and regular rhythm  No extrasystoles are present  Heart sounds: Murmur heard  Pulmonary:      Effort: Pulmonary effort is normal  No tachypnea, bradypnea or respiratory distress  Breath sounds: Normal air entry  No decreased air movement  Decreased breath sounds (bilateral bases) present  No wheezing  Abdominal:      General: Bowel sounds are normal  There is distension  Palpations: Abdomen is soft  Tenderness: There is no abdominal tenderness  Musculoskeletal:      Cervical back: Neck supple  Right lower leg: Edema (trace) present  Left lower leg: Edema (trace) present  Skin:     General: Skin is warm and dry  Coloration: Skin is not jaundiced or pale  Comments: Multiple wounds on upper and lower extremities in various stages of healing (ranging 1-2 cm in diameter)   Neurological:      General: No focal deficit present  Mental Status: She is alert and oriented to person, place, and time  Psychiatric:         Attention and Perception: Attention normal          Mood and Affect: Mood and affect normal          Speech: Speech normal          Behavior: Behavior normal  Behavior is cooperative  Thought Content:  Thought content normal        Labs & Results:  Lab Results   Component Value Date    WBC 5 53 05/16/2023    HGB 10 4 (L) 05/16/2023    HCT 35 2 05/16/2023    MCV 84 05/16/2023     05/16/2023     Lab Results   Component Value Date    SODIUM 138 05/16/2023    K 4 9 05/16/2023     05/16/2023    CO2 29 05/16/2023    BUN 18 05/16/2023    CREATININE 2 14 (H) 05/16/2023    GLUC 106 05/16/2023    CALCIUM 9 1 05/16/2023     Lab Results   Component Value Date    INR 1 22 (H) 04/05/2023    INR 1 22 (H) 04/04/2023 INR 1 10 01/13/2023    PROTIME 15 6 (H) 04/05/2023    PROTIME 15 7 (H) 04/04/2023    PROTIME 14 4 01/13/2023     Lab Results   Component Value Date    NTBNP 9,053 (H) 01/13/2023      Lab Results   Component Value Date    BNP 1,328 (H) 05/08/2023      Davina Pena PA-C

## 2023-05-17 NOTE — PATIENT INSTRUCTIONS
Increase Lasix to 40 mg total once daily  Will have our nurse contact you next for update on symptoms and weights  New prescription sent for smaller potassium pills  Begin taking 2 tablets once a day  Complete blood work this week  Please weigh yourself every day (after emptying your bladder) and keep a detailed log of weights  Contact the Heart Failure program at [phone] if you gain 3 lbs overnight or 5 lbs in 5-7 days  Limit daily sodium/salt intake to 2000 mg daily to prevent fluid retention  Avoid canned foods, fast food/Chinese food, and processed meats (hot dogs, lunch meat, and sausage etc )  Caution with condiments  Limit fluid intake to 2000 mL or 2 liters (about 60-65 ounces) daily  Avoid electrolyte replacement drinks (such as Gatorade, Pedialyte, Propel, Liquid IV, etc )  Bring complete list of medications and log of daily weights to your follow-up appointment

## 2023-05-22 ENCOUNTER — TELEPHONE (OUTPATIENT)
Dept: CARDIOLOGY CLINIC | Facility: CLINIC | Age: 58
End: 2023-05-22

## 2023-05-22 NOTE — ANESTHESIA PREPROCEDURE EVALUATION
Procedure:  Cardiac eps/av node ablation ([place])    Relevant Problems   CARDIO   (+) Atypical atrial flutter (HCC)   (+) Essential hypertension   (+) Paroxysmal A-fib (HCC)      GI/HEPATIC   (+) Chronic hepatitis C without hepatic coma (HCC)   (+) Gastroesophageal reflux disease       /RENAL   (+) Chronic kidney disease stage 3    (+) Nephrolithiasis   (+) Renal cyst      HEMATOLOGY   (+) Pancytopenia       MUSCULOSKELETAL   (+) Left low back pain      NEURO/PSYCH   (+) Headache   (+) History of monomorphic ventricular tachycardia, s/p ICD in 2016        Physical Exam    Airway    Mallampati score: II  TM Distance: >3 FB  Neck ROM: full     Dental       Cardiovascular      Pulmonary      Other Findings        Anesthesia Plan  ASA Score- 3     Anesthesia Type- IV sedation with anesthesia with ASA Monitors  Additional Monitors:   Airway Plan:           Plan Factors-Exercise tolerance (METS): >4 METS  Chart reviewed  Induction- intravenous  Postoperative Plan-     Informed Consent- Anesthetic plan and risks discussed with patient  I personally reviewed this patient with the CRNA  Discussed and agreed on the Anesthesia Plan with the CRNA  Romel Mendez

## 2023-05-22 NOTE — TELEPHONE ENCOUNTER
Edda Severin, PA-C Marshel Spittle, JAE  Patient seen last week for HFU  Weight 15-20 lbs above dry weight  Was taking only half recommended PO Lasix dose since discharge  Advised to increased to prescribed 40 mg daily  To complete BMP sometime this week  Please contact patient for update on symptoms and weights  If weights fail to come down over the weekend, will likely need to crank up diuretics  Thanks!    MJ

## 2023-05-23 ENCOUNTER — APPOINTMENT (OUTPATIENT)
Dept: LAB | Facility: CLINIC | Age: 58
End: 2023-05-23

## 2023-05-23 DIAGNOSIS — I12.9 BENIGN HYPERTENSION WITH CKD (CHRONIC KIDNEY DISEASE) STAGE III (HCC): ICD-10-CM

## 2023-05-23 DIAGNOSIS — N18.30 BENIGN HYPERTENSION WITH CKD (CHRONIC KIDNEY DISEASE) STAGE III (HCC): ICD-10-CM

## 2023-05-23 DIAGNOSIS — N18.32 STAGE 3B CHRONIC KIDNEY DISEASE (HCC): ICD-10-CM

## 2023-05-23 DIAGNOSIS — Z09 HOSPITAL DISCHARGE FOLLOW-UP: ICD-10-CM

## 2023-05-23 DIAGNOSIS — N18.4 STAGE 4 CHRONIC KIDNEY DISEASE (HCC): ICD-10-CM

## 2023-05-23 LAB
ANION GAP SERPL CALCULATED.3IONS-SCNC: 6 MMOL/L (ref 4–13)
BUN SERPL-MCNC: 16 MG/DL (ref 5–25)
CALCIUM SERPL-MCNC: 9.4 MG/DL (ref 8.4–10.2)
CHLORIDE SERPL-SCNC: 105 MMOL/L (ref 96–108)
CO2 SERPL-SCNC: 29 MMOL/L (ref 21–32)
CREAT SERPL-MCNC: 1.68 MG/DL (ref 0.6–1.3)
GFR SERPL CREATININE-BSD FRML MDRD: 33 ML/MIN/1.73SQ M
GLUCOSE SERPL-MCNC: 77 MG/DL (ref 65–140)
POTASSIUM SERPL-SCNC: 4.3 MMOL/L (ref 3.5–5.3)
SODIUM SERPL-SCNC: 140 MMOL/L (ref 135–147)

## 2023-05-24 ENCOUNTER — TELEPHONE (OUTPATIENT)
Dept: NEPHROLOGY | Facility: CLINIC | Age: 58
End: 2023-05-24

## 2023-05-24 NOTE — TELEPHONE ENCOUNTER
Pt called to schedule appointment she missed 5/23 with Wyatt Ray , we were in middle of call and got disconnected, I tried calling back pt number on file but said cannot take calls at this time  Pt needs an appt   after 12 p due to work

## 2023-05-25 ENCOUNTER — TELEPHONE (OUTPATIENT)
Dept: NEPHROLOGY | Facility: CLINIC | Age: 58
End: 2023-05-25

## 2023-05-25 NOTE — TELEPHONE ENCOUNTER
Patient called stating she missed her appointment earlier in the week  Patient is rescheduled for 9/29/23  Patient is requesting Dr Gbarielle Gordon look over labs and determine if patient needs to be seen sooner

## 2023-05-26 ENCOUNTER — TELEPHONE (OUTPATIENT)
Dept: NEPHROLOGY | Facility: CLINIC | Age: 58
End: 2023-05-26

## 2023-05-29 DIAGNOSIS — R11.2 NAUSEA AND VOMITING, UNSPECIFIED VOMITING TYPE: ICD-10-CM

## 2023-05-29 RX ORDER — PANTOPRAZOLE SODIUM 40 MG/1
TABLET, DELAYED RELEASE ORAL
Qty: 30 TABLET | Refills: 2 | Status: SHIPPED | OUTPATIENT
Start: 2023-05-29

## 2023-05-29 NOTE — PROGRESS NOTES
Heart Failure Outpatient Progress Note - Mary Cunha 62 y o  female MRN: [id number]    @ Encounter: [id number]      Assessment/Plan:    Patient Active Problem List    Diagnosis Date Noted   • Abnormal finding on CT scan 05/08/2023   • Morbid obesity 01/14/2023   • Pancytopenia  01/14/2023   • Left low back pain 11/05/2022   • Chronic heart failure with preserved ejection fraction (HFpEF) ([facility] ) 10/13/2022   • Renal cyst 08/12/2022   • Diverticulitis of large intestine without perforation or abscess 06/23/2022   • Leucopenia 06/18/2022   • Nephrolithiasis 06/09/2022   • Essential hypertension 04/08/2022   • Chronic kidney disease stage 3  01/31/2022   • Chronic hepatitis C without hepatic coma ([facility] ) 01/30/2022   • Acute right flank pain 08/15/2021   • History of tachycardia induced cardiomyopathy 05/25/2021   • Paroxysmal A-fib ([facility] ) 05/14/2020   • Cocaine abuse ([facility] ) 05/14/2020   • Elevated lipase 10/10/2019   • Headache 12/26/2018   • Tobacco abuse 12/26/2018   • History of monomorphic ventricular tachycardia, s/p ICD in 2016 07/13/2016   • Gastroesophageal reflux disease  03/20/2016   • Atypical atrial flutter ([facility] ) 03/20/2016     Plan:  Hypertensive urgency  Presents to clinic with BP of 180-190/110 and complaints of headache  Reports SBP readings at home over last 4-5 days of >200  Adherent to medical therapy  Advised ER eval      History of atrial flutter / atrial fibrillation               [id number] = 3 (sex, HF, HTN)  Anticoagulation on Xarelto  S/p unspecified ablation at [facility] in 2015  S/p Afib ablation with PVI in 05/2020  S/p atypical flutter and micro-reentrant atrial tachycardia ablation in 06/2022  S/p AV node ablation with ICD reprogramming on 05/10/2023  Rate control: metoprolol succinate 50 mg daily  Rhythm control: None      Acute on Chronic HFimpEF  Etiology: nonischemic; tachy-mediated in 05/2021 (LVEF 30%) and again in 06/2022 (LVEF 40-45%) in setting of rapid Aflutter  Today presents markedly hypertensive with complaints of persistent headache and orthopnea for last 5 nights  Filling pressures elevated  Advised ER evaluation  as above  Weight of 235 lbs on 05/12 (day of discharge),242 lbs, today 236 lbs                TTE 03/2016: LVEF 60-65%  LHC 01/07/2019: no obstructive CAD  TTE 04/18/2019: LVEF 65%  TTE 09/18/2020: LVEF 65%  TTE 05/25/2021: LVEF 30%  LVIDd 3 7 cm  Reduced RVSF  Trace MR and TR  TTE 01/31/2022: LVEF 55%  LVIDd 4 8 cm  Grade 2 DD  Mildly reduced RVSF  TTE (limited) 06/19/2022: LVEF 40-45%  LVIDd 4 1 cm    TTE (limited) 07/28/2022: LVEF 55%  Normal RV  TTE 04/05/2023: LVEF 65%  LVIDd 3 6 cm  Normal RV  Mild TR    TTE (limited) 05/12/2023: LVEF 65%  Normal RV  Pharmacotherapies / Neurohormonal Blockade: Doxazosin 2 mg at HS  --Beta Blocker: Metoprolol succinate 50 mg daily  --ARNi / ACEi / ARB: No, CKD precludes  --Aldosterone Antagonist: No, CKD precludes  --SGLT2 Inhibitor:   --Diuretic: Lasix 40 mg daily with potassium 20 mEq daily       Sudden Cardiac Death Risk Reduction:  --Medtronic dual chamber ICD in situ since 07/2016  --Interrogation from 12/2022: AP 88 5%   0 8%  Lead parameters WNL  AF burden 15 1%  OptiVol WNL  Normal device function       Electrical Resynchronization:  --Candidacy for BiV device: RBBB with  ms       Chronic kidney disease, stage IV Baseline creatinine of 1 7-2  1  Stable  Follows with Dr Codey Dia as outpatient  History of monomorphic ventricular tachycardia: noted on outpatient loop recorder; s/p secondary prevention ICD in 07/2016  Obstructive sleep apnea  Tobacco abuse: currently smoking 3 cigarettes per day  History of cocaine use: last used in 03/2023    Marijuana use:  occasionally     HPI:   Alexander Salinas is a 59-year-old woman with a PMH as above who presents to the office for hospital follow-up  Follows with Dr Gonzales Luna       Presented to [facility] on 05/07/2023 with frequent palpitations  Found to be in rapid atrial flutter in ED, started on amiodarone drip, and cardiology consulted  IV Lasix was started, and amiodarone switched to PO diltiazem (due to prior concern that amiodarone may be pro-arrhythmic for patient)  Transferred to [facility] on 05/09 for EP evaluation  Seen by EP and underwent AVN ablation with ICD reprogramming on 05/10/2023  Transitioned to PO Lasix on 05/12  Lost 6 lbs this admission       05/17/2023: Patient presents with her  for hospital follow-up  Up 7 lbs since discharge  Reports her normal weight prior to her hospitalization was around 322 lbs (confirmed in Epic)  With this weight gain, she reports new ABDUL after walking about 1/2 block as well as hand swelling (symptom she has had before when overloaded)  Has chronic mild orthopnea, and denies PND  Reports at time of admission was only able to walk a few steps before feeling winded  One month ago, was walking and active with no limitations  Has only been taking 20 mg Lasix daily (rather than 40 mg) - was using tablets from a prior prescription  Unable to tolerate potassium pills - has been vomiting them up soon after taking  Has good appetite with occasional early satiety  Recent meals included chicken pot pie (at restaurant on Mother's Day) and salads with ranch or Western Geraldine dressing  Does not add salt to foods; uses Mrs  Dash only  5/31/23  Presents for 2 week follow up  Was only taking half of her Lasix dose at last visit and weight was up significantly  Today she reports taking 40 mg of Lasix since last visit and  weight is back down to baseline  However patient is markedly hypertensive in clinic today with systolic over 339  Tells me the last 4-5 days she has been checking at home and getting readings as high as 627, systolic   Has developed headaches and now experiencing  orthopnea with PND over past week  She has been adherent to medical therapy  Still smoking a few cigarettes per day and occasional marijuana  Past Medical History:   Diagnosis Date   • ACS (acute coronary syndrome) ([place] [address] ) 02/07/2021   • Acute on chronic diastolic CHF 06/08/2501   • Arthritis    • Atrial fibrillation (HCC)    • Atrial fibrillation with rapid ventricular response ([place] [address] ) 03/20/2016   • Breast lump    • CKD (chronic kidney disease) stage 3, GFR 30-59 ml/min (HCC)    • Disease of thyroid gland    • Elevated troponin 09/09/2019   • Epigastric abdominal tenderness 08/15/2021   • 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 ([place] [address] ) 05/25/2020   • GERD (gastroesophageal reflux disease)    • H/O transfusion 1987   • Hepatitis C     resolved   • History of tachycardia induced cardiomyopathy 05/2021    in setting of rapid atrial flutter in 05/2021 and again 06/2022   • History of ventricular tachycardia 07/2016    s/p ICD   • Hyperlipidemia    • Hypertension    • Inappropriate ICD discharge for atrial flutter 04/2023   • NSTEMI (non-ST elevated myocardial infarction) ([place] [address] ) 12/26/2018   • Sleep apnea     no cpap       12 point ROS negative other than that stated in HPI    Allergies   Allergen Reactions   • Coconut Oil - Food Allergy Hives   • Iodinated Contrast Media Hives   • Tape  [Medical Tape] Hives           Current Outpatient Medications:   •  acetaminophen (TYLENOL) 500 mg tablet, Take 2 tablets (1,000 mg total) by mouth every 6 (six) hours as needed for mild pain for up to 20 doses, Disp: 40 tablet, Rfl: 0  •  Diclofenac Sodium (VOLTAREN) 1 %, Apply 2 g topically every 6 (six) hours as needed (pain), Disp: 100 g, Rfl: 0  •  doxazosin (CARDURA) 2 mg tablet, take 1 tablet by mouth daily at bedtime, Disp: 30 tablet, Rfl: 5  •  furosemide (LASIX) 40 mg tablet, Take 1 tablet (40 mg total) by mouth daily, Disp: 90 tablet, Rfl: 1  •  metoprolol succinate (TOPROL-XL) 50 mg 24 hr tablet, Take 1 tablet (50 "mg total) by mouth daily Do not start before May 13, 2023 , Disp: 30 tablet, Rfl: 0  •  nicotine (NICODERM CQ) 21 mg/24 hr TD 24 hr patch, Place 1 patch on the skin over 24 hours daily Do not start before May 13, 2023  (Patient not taking: Reported on 5/17/2023), Disp: 28 patch, Rfl: 0  •  nystatin powder, apply topically to affected area twice a day, Disp: , Rfl:   •  pantoprazole (PROTONIX) 40 mg tablet, take 1 tablet by mouth once daily, Disp: 30 tablet, Rfl: 2  •  potassium chloride (K-DUR,KLOR-CON) 10 mEq tablet, Take 2 tablets (20 mEq total) by mouth daily, Disp: 60 tablet, Rfl: 1  •  rivaroxaban (XARELTO) 15 mg tablet, Take 1 tablet (15 mg total) by mouth every evening, Disp: 30 tablet, Rfl: 11    Social History     Socioeconomic History   • Marital status: /Civil Union     Spouse name: Not on file   • Number of children: Not on file   • Years of education: 12   • Highest education level: Not on file   Occupational History   • Not on file   Tobacco Use   • Smoking status: Every Day     Packs/day: 0 50     Years: 35 00     Total pack years: 17 50     Types: Cigarettes   • Smokeless tobacco: Never   Vaping Use   • Vaping Use: Never used   Substance and Sexual Activity   • Alcohol use: Not Currently     Comment: occassionally   • Drug use: Yes     Types: Marijuana, Cocaine     Comment: Last used cocaine in 03/2023  Currently smoking \"1 drag of a joint\" at night to help her sleep (Updated 05/17/2023)  • Sexual activity: Yes     Partners: Male     Birth control/protection: None   Other Topics Concern   • Not on file   Social History Narrative    Disabled        · Do you currently or have you served in [employer]:   No      · Were you activated, into active duty, as a member of the [employer] or as a Reservist:   No      · Diet:   Regular      · General stress level:   High      · Has smoked since age:   12      · Caffeine intake:    Moderate      · Guns present in home:   No      · Seat belts " used routinely:   Yes      · Sunscreen used routinely:   No      · Advance directive:   No      · Live alone or with others:   with others      · International travel:   no      · Pets:   No      · Blind or serious difficulty seeing: Yes     · Difficulty concentrating, remembering or making decisions:   No      · Difficulty walking or climbing stairs: Yes      · Difficulty dressing or bathing:   No      · Difficulty doing errands alone:   No      · What is the highest grade or level of school you have completed or the highest degree you have received:   12th grade, no diploma      · How many days of moderate to strenuous exercise, like a brisk walk, did you do in the last 7 days:   0      · How hard is it for you to pay for the very basics like food, housing, medical care, and heating:   Somewhat hard      · Do you feel stress - tense, restless, nervous, or anxious, or unable to sleep at night because your mind is troubled all the time - these days: Only a little          Social Determinants of Health     Financial Resource Strain: Not on file   Food Insecurity: Food Insecurity Present (5/11/2023)    Hunger Vital Sign    • Worried About Running Out of Food in the Last Year: Sometimes true    • Ran Out of Food in the Last Year: Sometimes true   Transportation Needs: No Transportation Needs (5/11/2023)    PRAPARE - Transportation    • Lack of Transportation (Medical): No    • Lack of Transportation (Non-Medical):  No   Physical Activity: Not on file   Stress: Not on file   Social Connections: Not on file   Intimate Partner Violence: Not on file   Housing Stability: Low Risk  (5/11/2023)    Housing Stability Vital Sign    • Unable to Pay for Housing in the Last Year: No    • Number of Places Lived in the Last Year: 1    • Unstable Housing in the Last Year: No       Family History   Problem Relation Age of Onset   • Arthritis Family    • Cancer Family    • Diabetes Family    • Hypertension Family    • Cancer "Maternal Grandmother        Physical Exam:    Vitals BP (!) 180/110 (BP Location: Left arm, Patient Position: Sitting, Cuff Size: Large)   Pulse 78   Ht 5' 7\" (1 702 m)   Wt 107 kg (236 lb 6 4 oz)   SpO2 100%   BMI 37 03 kg/m²     Wt Readings from Last 3 Encounters:   05/17/23 110 kg (242 lb 8 oz)   05/12/23 107 kg (235 lb 14 3 oz)   05/09/23 108 kg (238 lb 12 1 oz)         GEN: Madison Santiago appears well, alert and oriented x 3, pleasant and cooperative   HEENT: pupils equal, round, and reactive to light; extraocular muscles intact  NECK: supple, no carotid bruits   HEART: regular rhythm, normal S1 and S2, no murmurs, clicks, gallops or rubs, JVP is  up  LUNGS: clear to auscultation bilaterally; no wheezes, rales, or rhonchi   ABDOMEN: normal bowel sounds, soft, no tenderness, no distention  EXTREMITIES: peripheral pulses normal; no clubbing, cyanosis, or edema  NEURO: no focal findings   SKIN: normal without suspicious lesions on exposed skin    Labs & Results:    Chemistry        Component Value Date/Time    BUN 16 05/23/2023 1552     05/23/2023 1552    CO2 29 05/23/2023 1552    CO2 34 (H) 07/25/2021 1835    CREATININE 1 68 (H) 05/23/2023 1552    K 4 3 05/23/2023 1552        Component Value Date/Time    ALKPHOS 61 05/07/2023 2345    ALT 11 05/07/2023 2345    AST 12 (L) 05/07/2023 2345    CALCIUM 9 4 05/23/2023 1552        Lab Results   Component Value Date    HCT 35 2 05/16/2023    HGB 10 4 (L) 05/16/2023    MCV 84 05/16/2023     05/16/2023    WBC 5 53 05/16/2023     Lab Results   Component Value Date    NTBNP 9,053 (H) 01/13/2023      Lab Results   Component Value Date    NTBNP 9,053 (H) 01/13/2023      Lab Results   Component Value Date    LDLCALC 68 04/05/2023     Lab Results   Component Value Date    [id number] 3 212 04/04/2023         EKG personally reviewed by CHARLOTTE Murillo  No results found for this visit on 05/31/23       Counseling / Coordination of Care  Total floor / " unit time spent today 40 minutes  Greater than 50% of total time was spent with the patient and / or family counseling and / or coordination of care  A description of the counseling / coordination of care: 20  Thank you for the opportunity to participate in the care of this patient      Paula Berger

## 2023-05-31 ENCOUNTER — APPOINTMENT (EMERGENCY)
Dept: RADIOLOGY | Facility: HOSPITAL | Age: 58
DRG: 199 | End: 2023-05-31
Payer: COMMERCIAL

## 2023-05-31 ENCOUNTER — OFFICE VISIT (OUTPATIENT)
Dept: CARDIOLOGY CLINIC | Facility: CLINIC | Age: 58
End: 2023-05-31

## 2023-05-31 ENCOUNTER — HOSPITAL ENCOUNTER (INPATIENT)
Facility: HOSPITAL | Age: 58
LOS: 1 days | Discharge: HOME | DRG: 199 | End: 2023-06-01
Attending: EMERGENCY MEDICINE | Admitting: FAMILY MEDICINE
Payer: COMMERCIAL

## 2023-05-31 ENCOUNTER — APPOINTMENT (INPATIENT)
Dept: RADIOLOGY | Facility: HOSPITAL | Age: 58
DRG: 199 | End: 2023-05-31
Payer: COMMERCIAL

## 2023-05-31 VITALS
HEIGHT: 67 IN | WEIGHT: 236.4 LBS | BODY MASS INDEX: 37.1 KG/M2 | SYSTOLIC BLOOD PRESSURE: 180 MMHG | OXYGEN SATURATION: 100 % | HEART RATE: 78 BPM | DIASTOLIC BLOOD PRESSURE: 110 MMHG

## 2023-05-31 DIAGNOSIS — I10 ESSENTIAL HYPERTENSION: ICD-10-CM

## 2023-05-31 DIAGNOSIS — I50.32 CHRONIC HEART FAILURE WITH PRESERVED EJECTION FRACTION (HFPEF) (HCC): ICD-10-CM

## 2023-05-31 DIAGNOSIS — R51.9 HEADACHE: ICD-10-CM

## 2023-05-31 DIAGNOSIS — R07.9 CHEST PAIN: Primary | ICD-10-CM

## 2023-05-31 DIAGNOSIS — R06.02 SHORTNESS OF BREATH: ICD-10-CM

## 2023-05-31 DIAGNOSIS — I10 HTN (HYPERTENSION): ICD-10-CM

## 2023-05-31 DIAGNOSIS — I16.0 HYPERTENSIVE URGENCY: Primary | ICD-10-CM

## 2023-05-31 PROBLEM — I16.1 HYPERTENSIVE EMERGENCY: Status: ACTIVE | Noted: 2023-05-31

## 2023-05-31 PROBLEM — I16.1 HYPERTENSIVE EMERGENCY: Status: RESOLVED | Noted: 2023-05-31 | Resolved: 2023-05-31

## 2023-05-31 PROBLEM — D64.9 ANEMIA: Status: ACTIVE | Noted: 2023-01-14

## 2023-05-31 LAB
2HR DELTA HS TROPONIN: -2 NG/L
ALBUMIN SERPL BCP-MCNC: 3.9 G/DL (ref 3.5–5)
ALP SERPL-CCNC: 72 U/L (ref 46–116)
ALT SERPL W P-5'-P-CCNC: 22 U/L (ref 12–78)
ANION GAP SERPL CALCULATED.3IONS-SCNC: 0 MMOL/L (ref 4–13)
AST SERPL W P-5'-P-CCNC: 24 U/L (ref 5–45)
ATRIAL RATE: 75 BPM
BASOPHILS # BLD AUTO: 0.03 THOUSANDS/ÂΜL (ref 0–0.1)
BASOPHILS NFR BLD AUTO: 1 % (ref 0–1)
BILIRUB SERPL-MCNC: 0.4 MG/DL (ref 0.2–1)
BNP SERPL-MCNC: 467 PG/ML (ref 0–100)
BUN SERPL-MCNC: 18 MG/DL (ref 5–25)
CALCIUM SERPL-MCNC: 9.2 MG/DL (ref 8.3–10.1)
CARDIAC TROPONIN I PNL SERPL HS: 69 NG/L
CARDIAC TROPONIN I PNL SERPL HS: 71 NG/L
CHLORIDE SERPL-SCNC: 108 MMOL/L (ref 96–108)
CO2 SERPL-SCNC: 29 MMOL/L (ref 21–32)
CREAT SERPL-MCNC: 1.79 MG/DL (ref 0.6–1.3)
EOSINOPHIL # BLD AUTO: 0.06 THOUSAND/ÂΜL (ref 0–0.61)
EOSINOPHIL NFR BLD AUTO: 1 % (ref 0–6)
ERYTHROCYTE [DISTWIDTH] IN BLOOD BY AUTOMATED COUNT: 15.3 % (ref 11.6–15.1)
GFR SERPL CREATININE-BSD FRML MDRD: 30 ML/MIN/1.73SQ M
GLUCOSE SERPL-MCNC: 91 MG/DL (ref 65–140)
HCT VFR BLD AUTO: 36.2 % (ref 34.8–46.1)
HGB BLD-MCNC: 10.8 G/DL (ref 11.5–15.4)
IMM GRANULOCYTES # BLD AUTO: 0.02 THOUSAND/UL (ref 0–0.2)
IMM GRANULOCYTES NFR BLD AUTO: 0 % (ref 0–2)
LYMPHOCYTES # BLD AUTO: 1.26 THOUSANDS/ÂΜL (ref 0.6–4.47)
LYMPHOCYTES NFR BLD AUTO: 19 % (ref 14–44)
MCH RBC QN AUTO: 25.2 PG (ref 26.8–34.3)
MCHC RBC AUTO-ENTMCNC: 29.8 G/DL (ref 31.4–37.4)
MCV RBC AUTO: 85 FL (ref 82–98)
MONOCYTES # BLD AUTO: 0.48 THOUSAND/ÂΜL (ref 0.17–1.22)
MONOCYTES NFR BLD AUTO: 7 % (ref 4–12)
NEUTROPHILS # BLD AUTO: 4.76 THOUSANDS/ÂΜL (ref 1.85–7.62)
NEUTS SEG NFR BLD AUTO: 72 % (ref 43–75)
NRBC BLD AUTO-RTO: 0 /100 WBCS
PLATELET # BLD AUTO: 179 THOUSANDS/UL (ref 149–390)
PMV BLD AUTO: 11.8 FL (ref 8.9–12.7)
POTASSIUM SERPL-SCNC: 4 MMOL/L (ref 3.5–5.3)
PROT SERPL-MCNC: 8.3 G/DL (ref 6.4–8.4)
QRS AXIS: -76 DEGREES
QRSD INTERVAL: 188 MS
QT INTERVAL: 458 MS
QTC INTERVAL: 528 MS
RBC # BLD AUTO: 4.28 MILLION/UL (ref 3.81–5.12)
SODIUM SERPL-SCNC: 137 MMOL/L (ref 135–147)
T WAVE AXIS: 99 DEGREES
VENTRICULAR RATE: 80 BPM
WBC # BLD AUTO: 6.61 THOUSAND/UL (ref 4.31–10.16)

## 2023-05-31 PROCEDURE — 36415 COLL VENOUS BLD VENIPUNCTURE: CPT | Performed by: EMERGENCY MEDICINE

## 2023-05-31 PROCEDURE — 80053 COMPREHEN METABOLIC PANEL: CPT | Performed by: EMERGENCY MEDICINE

## 2023-05-31 PROCEDURE — 70450 CT HEAD/BRAIN W/O DYE: CPT

## 2023-05-31 PROCEDURE — 99223 1ST HOSP IP/OBS HIGH 75: CPT | Performed by: INTERNAL MEDICINE

## 2023-05-31 PROCEDURE — 84484 ASSAY OF TROPONIN QUANT: CPT | Performed by: EMERGENCY MEDICINE

## 2023-05-31 PROCEDURE — 71046 X-RAY EXAM CHEST 2 VIEWS: CPT

## 2023-05-31 PROCEDURE — G1004 CDSM NDSC: HCPCS

## 2023-05-31 PROCEDURE — 85025 COMPLETE CBC W/AUTO DIFF WBC: CPT | Performed by: EMERGENCY MEDICINE

## 2023-05-31 PROCEDURE — 99223 1ST HOSP IP/OBS HIGH 75: CPT | Performed by: FAMILY MEDICINE

## 2023-05-31 PROCEDURE — 83880 ASSAY OF NATRIURETIC PEPTIDE: CPT | Performed by: EMERGENCY MEDICINE

## 2023-05-31 PROCEDURE — 93005 ELECTROCARDIOGRAM TRACING: CPT

## 2023-05-31 PROCEDURE — 93010 ELECTROCARDIOGRAM REPORT: CPT | Performed by: INTERNAL MEDICINE

## 2023-05-31 PROCEDURE — 99285 EMERGENCY DEPT VISIT HI MDM: CPT

## 2023-05-31 RX ORDER — ACETAMINOPHEN 325 MG/1
975 TABLET ORAL ONCE
Status: COMPLETED | OUTPATIENT
Start: 2023-05-31 | End: 2023-05-31

## 2023-05-31 RX ORDER — FUROSEMIDE 40 MG/1
40 TABLET ORAL DAILY
Status: DISCONTINUED | OUTPATIENT
Start: 2023-05-31 | End: 2023-06-01 | Stop reason: HOSPADM

## 2023-05-31 RX ORDER — POLYETHYLENE GLYCOL 3350 17 G/17G
17 POWDER, FOR SOLUTION ORAL DAILY
Status: DISCONTINUED | OUTPATIENT
Start: 2023-05-31 | End: 2023-06-01 | Stop reason: HOSPADM

## 2023-05-31 RX ORDER — PANTOPRAZOLE SODIUM 40 MG/1
40 TABLET, DELAYED RELEASE ORAL DAILY
Status: DISCONTINUED | OUTPATIENT
Start: 2023-05-31 | End: 2023-06-01 | Stop reason: HOSPADM

## 2023-05-31 RX ORDER — DOCUSATE SODIUM 100 MG/1
100 CAPSULE, LIQUID FILLED ORAL 2 TIMES DAILY
Status: DISCONTINUED | OUTPATIENT
Start: 2023-05-31 | End: 2023-06-01 | Stop reason: HOSPADM

## 2023-05-31 RX ORDER — ACETAMINOPHEN 500 MG
1000 TABLET ORAL EVERY 6 HOURS PRN
Status: DISCONTINUED | OUTPATIENT
Start: 2023-05-31 | End: 2023-05-31

## 2023-05-31 RX ORDER — SIMETHICONE 80 MG
80 TABLET,CHEWABLE ORAL 4 TIMES DAILY PRN
Status: DISCONTINUED | OUTPATIENT
Start: 2023-05-31 | End: 2023-06-01 | Stop reason: HOSPADM

## 2023-05-31 RX ORDER — MAGNESIUM HYDROXIDE/ALUMINUM HYDROXICE/SIMETHICONE 120; 1200; 1200 MG/30ML; MG/30ML; MG/30ML
30 SUSPENSION ORAL EVERY 6 HOURS PRN
Status: DISCONTINUED | OUTPATIENT
Start: 2023-05-31 | End: 2023-05-31

## 2023-05-31 RX ORDER — ACETAMINOPHEN 325 MG/1
500 TABLET ORAL EVERY 4 HOURS PRN
Status: DISCONTINUED | OUTPATIENT
Start: 2023-05-31 | End: 2023-06-01 | Stop reason: HOSPADM

## 2023-05-31 RX ORDER — NICOTINE 21 MG/24HR
1 PATCH, TRANSDERMAL 24 HOURS TRANSDERMAL DAILY
Status: DISCONTINUED | OUTPATIENT
Start: 2023-05-31 | End: 2023-06-01 | Stop reason: HOSPADM

## 2023-05-31 RX ORDER — HYDRALAZINE HYDROCHLORIDE 20 MG/ML
10 INJECTION INTRAMUSCULAR; INTRAVENOUS EVERY 4 HOURS PRN
Status: DISCONTINUED | OUTPATIENT
Start: 2023-05-31 | End: 2023-06-01 | Stop reason: HOSPADM

## 2023-05-31 RX ORDER — TRAMADOL HYDROCHLORIDE 50 MG/1
50 TABLET ORAL EVERY 6 HOURS PRN
Status: DISCONTINUED | OUTPATIENT
Start: 2023-05-31 | End: 2023-06-01 | Stop reason: HOSPADM

## 2023-05-31 RX ORDER — AMLODIPINE BESYLATE 5 MG/1
5 TABLET ORAL DAILY
Status: DISCONTINUED | OUTPATIENT
Start: 2023-05-31 | End: 2023-06-01 | Stop reason: HOSPADM

## 2023-05-31 RX ORDER — LABETALOL HYDROCHLORIDE 5 MG/ML
10 INJECTION, SOLUTION INTRAVENOUS EVERY 4 HOURS PRN
Status: DISCONTINUED | OUTPATIENT
Start: 2023-05-31 | End: 2023-06-01 | Stop reason: HOSPADM

## 2023-05-31 RX ORDER — DOXAZOSIN 2 MG/1
2 TABLET ORAL
Status: DISCONTINUED | OUTPATIENT
Start: 2023-05-31 | End: 2023-06-01 | Stop reason: HOSPADM

## 2023-05-31 RX ORDER — METOPROLOL SUCCINATE 50 MG/1
50 TABLET, EXTENDED RELEASE ORAL DAILY
Status: DISCONTINUED | OUTPATIENT
Start: 2023-05-31 | End: 2023-06-01 | Stop reason: HOSPADM

## 2023-05-31 RX ORDER — HYDRALAZINE HYDROCHLORIDE 20 MG/ML
10 INJECTION INTRAMUSCULAR; INTRAVENOUS EVERY 6 HOURS PRN
Status: DISCONTINUED | OUTPATIENT
Start: 2023-05-31 | End: 2023-05-31

## 2023-05-31 RX ORDER — CALCIUM CARBONATE 500 MG/1
1000 TABLET, CHEWABLE ORAL DAILY PRN
Status: DISCONTINUED | OUTPATIENT
Start: 2023-05-31 | End: 2023-06-01 | Stop reason: HOSPADM

## 2023-05-31 RX ORDER — ONDANSETRON 2 MG/ML
4 INJECTION INTRAMUSCULAR; INTRAVENOUS EVERY 6 HOURS PRN
Status: DISCONTINUED | OUTPATIENT
Start: 2023-05-31 | End: 2023-06-01 | Stop reason: HOSPADM

## 2023-05-31 RX ADMIN — DOCUSATE SODIUM 100 MG: 100 CAPSULE, LIQUID FILLED ORAL at 13:31

## 2023-05-31 RX ADMIN — HYDRALAZINE HYDROCHLORIDE 10 MG: 20 INJECTION, SOLUTION INTRAMUSCULAR; INTRAVENOUS at 12:11

## 2023-05-31 RX ADMIN — TRAMADOL HYDROCHLORIDE 50 MG: 50 TABLET, COATED ORAL at 21:10

## 2023-05-31 RX ADMIN — METOPROLOL SUCCINATE 50 MG: 50 TABLET, EXTENDED RELEASE ORAL at 13:31

## 2023-05-31 RX ADMIN — RIVAROXABAN 15 MG: 15 TABLET, FILM COATED ORAL at 17:20

## 2023-05-31 RX ADMIN — ONDANSETRON 4 MG: 2 INJECTION INTRAMUSCULAR; INTRAVENOUS at 18:52

## 2023-05-31 RX ADMIN — DOXAZOSIN 2 MG: 2 TABLET ORAL at 21:10

## 2023-05-31 RX ADMIN — ACETAMINOPHEN 975 MG: 325 TABLET ORAL at 11:15

## 2023-05-31 RX ADMIN — ACETAMINOPHEN 488 MG: 325 TABLET ORAL at 17:20

## 2023-05-31 RX ADMIN — PANTOPRAZOLE SODIUM 40 MG: 40 TABLET, DELAYED RELEASE ORAL at 13:31

## 2023-05-31 RX ADMIN — FUROSEMIDE 40 MG: 40 TABLET ORAL at 13:31

## 2023-05-31 RX ADMIN — TRAMADOL HYDROCHLORIDE 50 MG: 50 TABLET, COATED ORAL at 13:56

## 2023-05-31 RX ADMIN — AMLODIPINE BESYLATE 5 MG: 5 TABLET ORAL at 15:43

## 2023-05-31 RX ADMIN — POLYETHYLENE GLYCOL 3350 17 G: 17 POWDER, FOR SOLUTION ORAL at 13:32

## 2023-05-31 NOTE — ASSESSMENT & PLAN NOTE
· Patient has history of dyspnea on exertion and chest pain mainly exertional in nature  · Mild elevated troponin probably related to hypertension/ckd  · Trend for now  · Monitor on telemetry  · EKG is paced rhythm  · Cardiology evaluation

## 2023-05-31 NOTE — ASSESSMENT & PLAN NOTE
· Patient with chronic anemia    Likely secondary to CKD  · Hemoglobin today is 10 8 which is uptrending from discharge
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
## 2023-05-31 NOTE — ASSESSMENT & PLAN NOTE
· Patient with history of tachycardia induced cardiomyopathy  · His recent ejection fraction 65% on 5/10/2023    Normal LV diastolic function  · Patient gives history of PND/dyspnea on exertion reports that she can walk a block before she gets short of  breath   · Slightly elevated BNP noted  · Patient not appear to be overtly fluid overloaded on exam  · Currently on 40 mg of Lasix daily  · Continue with the current Lasix  · Monitor intake and output/daily weights  · Patient weighs 236 pounds in cardiology office today, discharge weight on 5/12 is 235 pounds

## 2023-05-31 NOTE — ASSESSMENT & PLAN NOTE
· Patient with known history of paroxysmal atrial fibrillation    Status post cardioversion in April and previous ablation in June 2022  · Was recently admitted to the hospital and had an AV miguel ablation done on May 10  · Status post AICD placement for VT in the past  · Continue with metoprolol  · Anticoagulated with Xarelto  · Currently in paced rhythm

## 2023-05-31 NOTE — ED ATTENDING ATTESTATION
Final Diagnoses:     1  Chest pain    2  Shortness of breath    3  HTN (hypertension)    4  Headache    5  Chronic heart failure with preserved ejection fraction (HFpEF) ([facility] [address] )    6  Essential hypertension      ED Course as of 06/03/23 0004   Wed May 31, 2023   1045 POCUS Cardiac/IVC + POCUS Lung:  - transthoracic echocardiogram was performed at bedside by myself and resident  - Images collected were inadequate quality / low quality    - This was a technically difficult study due to lung interference  - Apical, parasternal, subcostal views were obtained/attempted  - The main purpose of this study was to r/o obvious pathology requiring emergent intervention as in summary    - Many views/images obtained for educational reasons    - Findings: There was no obvious pericardial effusion:   LV function - couldn't evaluate, barely could see   RV function looked reduced but poorly visualized  IVC was IVC > 2 1cm; <50% compression with sniff likely RAP 15 mmHg     IVC Max: 2 5     CI: 19%   Lungs: There was no obvious pleural effusion  B-lines were not present anteriorly bilaterally at upper BLUE-point (3+ B-lines in 2+ fields w/ concern for HF/Cardiogenic pulmonary edema sensitivity 85-94%, specificity 92% LR+ 7 4-12; LR- 0 06-0 16)    Bilateral anterior lung sliding bilaterally present at upper BLUE-point (no pneumothorax, sen 91%; spec 98%)    Summary:   - Very dilated IVC  - There were no obvious B-lines  - There was no obvious anterior pneumothorax  - There was no large pericardial effusion  Shahida Bryant MD, saw and evaluated the patient  All available labs and X-rays were ordered by me or the resident / non-physician and have been reviewed by myself  I discussed the patient with the resident / non-physician and agree with the resident's / non-physician practitioner's findings and plan as documented in the resident's / non-physician practicitioner's note, except where noted     At this point, I agree with the current assessment done in the ED  I was present during key portions of all procedures performed unless otherwise stated  Nursing Triage:     Chief Complaint   Patient presents with   • Shortness of Breath     Seen at PCP's office- told to come to ER for High BP  Pt reports SOB and CP  HPI:   This is a 62 y o  female presenting for evaluation of multiple complaints  About 2 weeks ago the patient had an ablation, had similar symptoms of orthopnea, shortness of breath at that time  Since last night she has been having a headache feels like typical headache, diffuse severe  A little bit of nausea but no vomiting  No chest pain  She has been having shortness of breath though feeling worse when laying flat, worse when exerting herself  If she is just sitting there it is minimal and feels like when she had fluid overload in the past   No new lower extremity edema  ASSESSMENT + PLAN:   Dyspnea on exertion  - Given patient's concerns, will do a cardiac workup  - Will do an EKG for arrythmia, strain; troponin for same as per protocol for evaluation of ACS  - CBC for anemia; CMP for kidney function and electrolytes  - Will check CXR for pneumonia, PTX, fluid overload  - Will do POCUS Cardiac to evaluate for pericardial effusion  HEART score:  History 1=Moderate suspicious   ECG 1=Nonspecific repolarization disturbance   Age 1= > 45 - <65 years   Risk Factors 2= > 3 risk factors, or history of atherosclerotic disease   Troponin 2=Greater than or equal to 36 ng/L   Total 7   - Disposition per workup  - Migraine cocktail  Physical:   General: VS reviewed  Appears in NAD  awake, alert  Well-nourished, well-developed  Appears stated age  Speaking normally in full sentences  Head: Normocephalic, atraumatic  Eyes: EOM-I  No diplopia  No hyphema  No subconjunctival hemorrhages  Symmetrical lids  ENT: Atraumatic external nose and ears      MMM  Paced rhythm on monitor  No malocclusion  No stridor  Normal phonation  No drooling  Normal swallowing  Neck: No JVD  CV: No pallor noted  Lungs:   No tachypnea  No respiratory distress  Abd: soft nt nd no rebound/guarding  MSK:   FROM spontaneously  Skin: Dry, intact  Neuro: Awake, alert, GCS15, CN II-XII grossly intact  Motor grossly intact  Psychiatric/Behavioral: interacting normally; appropriate mood/affect   Exam: deferred    Vitals:    06/01/23 0341 06/01/23 0551 06/01/23 0704 06/01/23 1055   BP: 108/70  132/77 132/78   BP Location:       Pulse: 80  80 80   Resp: 17      Temp: 98 2 °F (36 8 °C)  98 °F (36 7 °C) 98 °F (36 7 °C)   TempSrc:       SpO2: 94%  92% 96%   Weight:  107 kg (235 lb 0 2 oz)       - There are no obvious limitations to social determinants of care  - Nursing note reviewed  - Vitals reviewed  - Orders placed by myself and/or advanced practitioner / resident     - Previous chart was reviewed  - No language barrier    - History obtained from patient  - There are no limitations to the history obtained:     Past Medical:    has a past medical history of ACS (acute coronary syndrome) ([facility] ) (02/07/2021), Acute on chronic diastolic CHF (28/40/5647), Arthritis, Atrial fibrillation ([facility] ), Atrial fibrillation with rapid ventricular response ([facility] ) (03/20/2016), Breast lump, CKD (chronic kidney disease) stage 3, GFR 30-59 ml/min ([facility] ), Disease of thyroid gland, Elevated troponin (09/09/2019), Epigastric abdominal tenderness (08/15/2021), 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 ([facility] ) (05/25/2020), GERD (gastroesophageal reflux disease), H/O transfusion (1987), Hepatitis C, History of tachycardia induced cardiomyopathy (05/2021), History of ventricular tachycardia (07/2016), Hyperlipidemia, Hypertension, Inappropriate ICD discharge for atrial flutter (04/2023), NSTEMI (non-ST elevated myocardial infarction) ([facility] ) (12/26/2018), and Sleep apnea      Past Surgical:    has a "past surgical history that includes Cholecystectomy; Knee surgery (Left); Replacement total knee (Left); Hysterectomy; Elbow surgery; pr esophagogastroduodenoscopy transoral diagnostic (N/A, 2018); Colonoscopy (2015); Cardiac pacemaker placement (); pr largsc exc tamiko&/strpg cords/epigl mcrscp/tlscp (N/A, 8/10/2018); Throat surgery; Cardiac defibrillator placement; Cardiac catheterization (2019); Total hip arthroplasty; Knee surgery; Joint replacement (Left, ); Joint replacement ( ); Skin lesion excision (10/20/2017); Nevus excision (10/20/2017); IR image guided aspiration / drainage (2020); Eye surgery; Colon surgery; US guidance (2018); US guidance (2018); Cardiac electrophysiology procedure (N/A, 2022); and Cardiac electrophysiology procedure (N/A, 5/10/2023)  Social:     Social History     Substance and Sexual Activity   Alcohol Use Not Currently    Comment: occassionally     Social History     Tobacco Use   Smoking Status Every Day   • Packs/day: 0 50   • Years: 35 00   • Total pack years: 17 50   • Types: Cigarettes   Smokeless Tobacco Never     Social History     Substance and Sexual Activity   Drug Use Yes   • Types: Marijuana, Cocaine    Comment: Last used cocaine in 2023  Currently smoking \"1 drag of a joint\" at night to help her sleep (Updated 2023)         Echo:   Results for orders placed during the hospital encounter of 21    Echo complete with contrast if indicated    Narrative  [address] [place] [address]    Transthoracic Echocardiogram  2D, M-mode, Doppler, and Color Doppler    Study date:  25-May-2021    Patient: Shell Chambers  MR number: [id number]  Account number: [de-identified]  : 1965  Age: 64 years  Gender: Female  Status: Inpatient  Location: [facility]  Height: 67 in  Weight: 212 lb  BP: 118/ 62 mmHg    Indications: Afib    Diagnoses: I48 0 - Atrial " fibrillation    Sonographer:  PRISCILLA Levy  Referring Physician:  Fany Johnson MD  Group:  [facility]  Interpreting Physician:  Shaniqua Marin MD    SUMMARY    LEFT VENTRICLE:  Systolic function was moderately to markedly reduced  Ejection fraction was estimated to be 30 %  There was moderate diffuse hypokinesis  There was severe concentric hypertrophy  There was significant hypertrophy of the LV apex  RIGHT VENTRICLE:  The size was normal   Systolic function was reduced  LEFT ATRIUM:  The atrium was dilated  HISTORY: PRIOR HISTORY: Cocaine abuse, Smoker, CKD III, Congestive heart failure  Hypertrophic cardiomyopathy  Atrial fibrillation  Risk factors: hypercholesterolemia  PRIOR PROCEDURES: ICD implantation  Arrhythmia ablation  PROCEDURE: The study was performed in the [facility]  This was a routine study  The transthoracic approach was used  The study included complete 2D imaging, M-mode, complete spectral Doppler, and color Doppler  The heart rate was 138  bpm, at the start of the study  Images were obtained from the parasternal, apical, subcostal, and suprasternal notch acoustic windows  Intravenous contrast (  6 mL Definity in NSS) was administered  Echocardiographic views were limited due  to poor acoustic window availability and lung interference  This was a technically difficult study  LEFT VENTRICLE: Size was normal  Systolic function was moderately to markedly reduced  Ejection fraction was estimated to be 30 %  There was moderate diffuse hypokinesis  Wall thickness was markedly increased  There was severe concentric  hypertrophy  There was significant hypertrophy of the LV apex  DOPPLER: Due to tachycardia, there was fusion of early and atrial contributions to ventricular filling  The study was not technically sufficient to allow evaluation of LV  diastolic function  RIGHT VENTRICLE: The size was normal  Systolic function was reduced   Wall thickness was normal  A pacing wire was present  LEFT ATRIUM: The atrium was dilated  RIGHT ATRIUM: Size was normal  A pacing wire was present  MITRAL VALVE: Valve structure was normal  There was normal leaflet separation  DOPPLER: The transmitral velocity was within the normal range  There was no evidence for stenosis  There was trace regurgitation  AORTIC VALVE: The valve was trileaflet  Leaflets exhibited normal thickness and normal cuspal separation  DOPPLER: Transaortic velocity was within the normal range  There was no evidence for stenosis  There was no significant  regurgitation  TRICUSPID VALVE: The valve structure was normal  There was normal leaflet separation  DOPPLER: The transtricuspid velocity was within the normal range  There was no evidence for stenosis  There was trace regurgitation  Pulmonary artery  systolic pressure was within the normal range  Estimated peak PA pressure was 21 mmHg  PULMONIC VALVE: Leaflets exhibited normal thickness, no calcification, and normal cuspal separation  DOPPLER: The transpulmonic velocity was within the normal range  There was trace regurgitation  PERICARDIUM: There was no pericardial effusion  The pericardium was normal in appearance  AORTA: The root exhibited normal size  SYSTEMIC VEINS: IVC: The inferior vena cava was normal in size   Respirophasic changes were normal     SYSTEM MEASUREMENT TABLES    2D  %FS: 27 49 %  Ao Diam: 2 77 cm  EDV(Teich): 57 94 ml  EF(Teich): 54 26 %  ESV(Teich): 26 5 ml  IVSd: 2 46 cm  LA Area: 26 06 cm2  LA Diam: 4 27 cm  LVIDd: 3 7 cm  LVIDs: 2 68 cm  LVPWd: 1 79 cm  RA Area: 18 49 cm2  RVIDd: 3 01 cm  RWT: 0 97  SV(Teich): 31 44 ml    CW  TR Vmax: 2 14 m/s  TR maxP 28 mmHg    MM  TAPSE: 1 51 cm    [employer] Accredited Echocardiography Laboratory    Prepared and electronically signed by    Ralph Blackman MD  Signed [id number] 14:44:53    Results for orders placed during the hospital encounter of 20    HUBER    Narrative  [facility]  [facility] [place], [address] [facility]  [phone]    Transesophageal Echocardiogram  2D, Doppler, and Color Doppler    Study date:  20-May-2020    Patient: Allyssa Frazier  MR number: [id number]  Account number: [de-identified]  : 1965  Age: 54 years  Gender: Female  Status: Outpatient  Location: Cath lab  Height: 67 in  Weight: 221 lb  BP:    Indications: AFIB    Diagnoses: I48 0 - Atrial fibrillation    Sonographer:  PRISCILLA Han  Interpreting Physician:  Edmond Bowie MD  Primary Physician:  Nieves Briggs MD  Referring Physician:  Edmond Bowie MD  Group:  [facility]    SUMMARY    LEFT VENTRICLE:  Systolic function was normal  Ejection fraction was estimated to be 60 %  Wall thickness was moderately increased  There was moderate concentric hypertrophy  LEFT ATRIUM:  The atrium was mildly dilated  LEFT ATRIAL APPENDAGE:  The size was normal   The function was normal (normal emptying velocity)  No thrombus was identified  ATRIAL SEPTUM:  No defect or patent foramen ovale was identified  HISTORY: PRIOR HISTORY: AFIB, PPM, MI, HOCM, HTN, HLD, CKD III, Smoker, Cocaine abuse    PROCEDURE: The procedure was performed in the catheterization laboratory  This was a routine study  The risks and alternatives of the procedure were explained to the patient and informed consent was obtained  The transesophageal approach  was used  The study included complete 2D imaging, complete spectral Doppler, and color Doppler  An adult omniplane probe was inserted by the attending cardiologist  Intubated with ease  One intubation attempt(s)  There was no blood  detected on the probe  Image quality was adequate  MEDICATIONS: Anesthesia  LEFT VENTRICLE: Size was normal  Systolic function was normal  Ejection fraction was estimated to be 60 %  Wall thickness was moderately increased   There was moderate "concentric hypertrophy  RIGHT VENTRICLE: The size was normal  Systolic function was normal  Wall thickness was normal  A pacing wire was present  LEFT ATRIUM: The atrium was mildly dilated  No mass was present  There was no spontaneous echo contrast (\"smoke\")  APPENDAGE: The size was normal  No thrombus was identified  DOPPLER: The function was normal (normal emptying velocity)  ATRIAL SEPTUM: No defect or patent foramen ovale was identified  RIGHT ATRIUM: Size was normal  No thrombus was identified  MITRAL VALVE: Valve structure was normal  There was normal leaflet separation  There was no echocardiographic evidence of vegetation  DOPPLER: There was no regurgitation  AORTIC VALVE: The valve was trileaflet  Leaflets exhibited normal thickness and normal cuspal separation  There was no echocardiographic evidence of vegetation  DOPPLER: There was no regurgitation  TRICUSPID VALVE: The valve structure was normal  There was normal leaflet separation  There was no echocardiographic evidence of vegetation  DOPPLER: There was no regurgitation  PERICARDIUM: There was no pericardial effusion  The pericardium was normal in appearance      [address] Accredited Echocardiography Laboratory    Prepared and electronically signed by    Brian Arango MD  Signed [id number] 09:25:57      Cath:    Results for orders placed during the hospital encounter of 19    Cardiac catheterization    Narrative  [facility], [address] [facility]  [phone]    [facility]    Invasive Cardiovascular Lab Complete Report    Patient: Mo Ray  MR number: [id number]  Account number: [de-identified]  Study date: 2019  Gender: Female  : 1965  Height: 66 9 in  Weight: 272 8 lb  BSA: 2 31 m squared    Allergies: [facility] [place], MEDICAL TAPE    Diagnostic Cardiologist:  Demetris Holland DO  Primary Physician:  Lois Diaz" SAMARIA    INDICATIONS:  --  ventricular tachycardia, elev trop possibly from arrhythmia, r/o cad  PROCEDURES PERFORMED    --  Left heart catheterization without ventriculogram   --  Left coronary angiography  --  Right coronary angiography  --  Left coronary angiography  --  Right coronary angiography  --  Outpatient  --  Mod Sedation Same Physician Initial 15min  --  Mod Sedation Same Physician Add 15min  --  Coronary Catheterization (w/ LHC)  PROCEDURE: The risks and alternatives of the procedures and conscious sedation were explained to the patient and informed consent was obtained  The patient was brought to the cath lab and placed on the table  The planned puncture sites  were prepped and draped in the usual sterile fashion  --  Right radial artery access  After performing an Leighton's test to verify adequate ulnar artery supply to the hand, the radial site was prepped  The puncture site was infiltrated with local anesthetic  The vessel was accessed using the  modified Seldinger technique, a wire was advanced into the vessel, and a sheath was advanced over the wire into the vessel  --  Left heart catheterization without ventriculogram  A catheter was advanced over a guidewire into the ascending aorta  After recording ascending aortic pressure, the catheter was advanced across the aortic valve and left ventricular  pressure was recorded  The catheter was pulled back across the aortic valve and into the ascending aorta and pullback pressures were obtained  --  Left coronary artery angiography  A catheter was advanced over a guidewire into the aorta and positioned in the left coronary artery ostium under fluoroscopic guidance  Angiography was performed  --  Right coronary artery angiography  A catheter was advanced over a guidewire into the aorta and positioned in the right coronary artery ostium under fluoroscopic guidance  Angiography was performed      --  Left coronary artery angiography  A catheter was advanced over a guidewire into the aorta and positioned in the left coronary artery ostium under fluoroscopic guidance  Angiography was performed  --  Right coronary artery angiography  A catheter was advanced over a guidewire into the aorta and positioned in the right coronary artery ostium under fluoroscopic guidance  Angiography was performed  --  Outpatient  --  Mod Sedation Same Physician Initial 15min  --  Mod Sedation Same Physician Add 15min  --  Coronary Catheterization (w/ LHC)  PROCEDURE COMPLETION: The patient tolerated the procedure well and was discharged from the cath lab  TIMING: Test started at 08:25  Test concluded at 08:47  HEMOSTASIS: The sheath was removed  The site was compressed with a Hemoband  device  Hemostasis was obtained  MEDICATIONS GIVEN: Fentanyl (1OOmcg/2 ml), 50 mcg, IV, at 08:33  Versed (2mg/2ml), 2 mg, IV, at 08:33  Fentanyl (1OOmcg/2 ml), 25 mcg, IV, at 08:36  Versed (2mg/2ml), 1 mg, IV, at 08:36  1% Lidocaine, 1 ml,  subcutaneously, at 08:39  Verapamil (5mg/2ml), 2 5 mg, IV, at 08:40  Heparin 1000 units/ml, 4,000 units, IV, at 08:40  Nitroglycerin (200mcg/ml), 200 mcg, at 08:40  CONTRAST GIVEN: 60 ml Omnipaque (350mg I /ml)  RADIATION EXPOSURE:  Fluoroscopy time: 2 15 min  HEMODYNAMICS: Hemodynamic assessment demonstrated normal LVEDP  CORONARY VESSELS:   --  The coronary circulation is right dominant  --  Left main: Angiography showed minor luminal irregularities  --  LAD: Angiography showed minor luminal irregularities  --  Circumflex: Angiography showed minor luminal irregularities  --  RCA: Angiography showed minor luminal irregularities  IMPRESSIONS:  No significant epicardial CAD  RECOMMENDATIONS  med rx cad standpoint      Prepared and signed by    Darwin Gonzalez DO  Signed 01/07/2019 09:07:14    Study diagram    Hemodynamic tables    Pressures:  Baseline  Pressures:  - HR: 60  Pressures:  - Rhythm:  Pressures:  -- Aortic Pressure (S/D/M): 152/87/55  Pressures:  -- Left Ventricle (s/edp): 139/36/--    Outputs:  Baseline  Outputs:  -- CALCULATIONS[age]  Outputs:  -- CALCULATIONS: Body Surface Area: 2 31  Outputs:  -- CALCULATIONS: Height in cm: 170 00  Outputs:  -- CALCULATIONS: Sex: Female  Outputs:  -- CALCULATIONS: Weight in k 00      Code Status: Prior  Advance Directive and Living Will:      Power of :    POLST:    Medications   acetaminophen (TYLENOL) tablet 975 mg (975 mg Oral Given 23 1115)     CT head wo contrast   Final Result      No acute intracranial abnormality  Workstation performed: [id number]         XR chest 2 views   Final Result      No acute cardiopulmonary disease                    Workstation performed: [id number]           Orders Placed This Encounter   Procedures   • XR chest 2 views   • CT head wo contrast   • CBC and differential   • Comprehensive metabolic panel   • HS Troponin 0hr (reflex protocol)   • B-Type Natriuretic Peptide(BNP)   • HS Troponin I 2hr   • HS Troponin 0hr (reflex protocol)   • Inpatient consult to Cardiology   • ECG 12 lead   • ECG 12 lead   • INPATIENT ADMISSION   • Discharge patient     Labs Reviewed   CBC AND DIFFERENTIAL - Abnormal       Result Value Ref Range Status    WBC 6 61  4 31 - 10 16 Thousand/uL Final    RBC 4 28  3 81 - 5 12 Million/uL Final    Hemoglobin 10 8 (*) 11 5 - 15 4 g/dL Final    Hematocrit 36 2  34 8 - 46 1 % Final    MCV 85  82 - 98 fL Final    MCH 25 2 (*) 26 8 - 34 3 pg Final    MCHC 29 8 (*) 31 4 - 37 4 g/dL Final    RDW 15 3 (*) 11 6 - 15 1 % Final    MPV 11 8  8 9 - 12 7 fL Final    Platelets 190  079 - 390 Thousands/uL Final    nRBC 0  /100 WBCs Final    Neutrophils Relative 72  43 - 75 % Final    Immat GRANS % 0  0 - 2 % Final    Lymphocytes Relative 19  14 - 44 % Final    Monocytes Relative 7  4 - 12 % Final    Eosinophils Relative 1  0 - 6 % Final    Basophils Relative 1  0 - 1 % Final    Neutrophils Absolute 4 76  1 85 - 7 62 Thousands/µL Final    Immature Grans Absolute 0 02  0 00 - 0 20 Thousand/uL Final    Lymphocytes Absolute 1 26  0 60 - 4 47 Thousands/µL Final    Monocytes Absolute 0 48  0 17 - 1 22 Thousand/µL Final    Eosinophils Absolute 0 06  0 00 - 0 61 Thousand/µL Final    Basophils Absolute 0 03  0 00 - 0 10 Thousands/µL Final   COMPREHENSIVE METABOLIC PANEL - Abnormal    Sodium 137  135 - 147 mmol/L Final    Potassium 4 0  3 5 - 5 3 mmol/L Final    Chloride 108  96 - 108 mmol/L Final    CO2 29  21 - 32 mmol/L Final    ANION GAP 0 (*) 4 - 13 mmol/L Final    BUN 18  5 - 25 mg/dL Final    Creatinine 1 79 (*) 0 60 - 1 30 mg/dL Final    Comment: Standardized to IDMS reference method    Glucose 91  65 - 140 mg/dL Final    Comment: If the patient is fasting, the ADA then defines impaired fasting glucose as > 100 mg/dL and diabetes as > or equal to 123 mg/dL  Specimen collection should occur prior to Sulfasalazine administration due to the potential for falsely depressed results  Specimen collection should occur prior to Sulfapyridine administration due to the potential for falsely elevated results  Calcium 9 2  8 3 - 10 1 mg/dL Final    AST 24  5 - 45 U/L Final    Comment: Specimen collection should occur prior to Sulfasalazine administration due to the potential for falsely depressed results  ALT 22  12 - 78 U/L Final    Comment: Specimen collection should occur prior to Sulfasalazine and/or Sulfapyridine administration due to the potential for falsely depressed results  Alkaline Phosphatase 72  46 - 116 U/L Final    Total Protein 8 3  6 4 - 8 4 g/dL Final    Albumin 3 9  3 5 - 5 0 g/dL Final    Total Bilirubin 0 40  0 20 - 1 00 mg/dL Final    Comment: Use of this assay is not recommended for patients undergoing treatment with eltrombopag due to the potential for falsely elevated results      eGFR 30  ml/min/1 73sq m Final    Narrative:     National Kidney "Disease Foundation guidelines for Chronic Kidney Disease (CKD):   •  Stage 1 with normal or high GFR (GFR > 90 mL/min/1 73 square meters)  •  Stage 2 Mild CKD (GFR = 60-89 mL/min/1 73 square meters)  •  Stage 3A Moderate CKD (GFR = 45-59 mL/min/1 73 square meters)  •  Stage 3B Moderate CKD (GFR = 30-44 mL/min/1 73 square meters)  •  Stage 4 Severe CKD (GFR = 15-29 mL/min/1 73 square meters)  •  Stage 5 End Stage CKD (GFR <15 mL/min/1 73 square meters)  Note: GFR calculation is accurate only with a steady state creatinine   HS TROPONIN I 0HR - Abnormal    hs TnI 0hr 71 (*) \"Refer to ACS Flowchart\"- see link ng/L Final    Comment:                                              Initial (time 0) result  If >=50 ng/L, Myocardial injury suggested ;  Type of myocardial injury and treatment strategy  to be determined  If 5-49 ng/L, a delta result at 2 hours and or 4 hours will be needed to further evaluate  If <4 ng/L, and chest pain has been >3 hours since onset, patient may qualify for discharge based on the HEART score in the ED  If <5 ng/L and <3hours since onset of chest pain, a delta result at 2 hours will be needed to further evaluate  HS Troponin 99th Percentile URL of a Health Population=12 ng/L with a 95% Confidence Interval of 8-18 ng/L  Second Troponin (time 2 hours)  If calculated delta >= 20 ng/L,  Myocardial injury suggested ; Type of myocardial injury and treatment strategy to be determined  If 5-49 ng/L and the calculated delta is 5-19 ng/L, consult medical service for evaluation  Continue evaluation for ischemia on ecg and other possible etiology and repeat hs troponin at 4 hours  If delta is <5 ng/L at 2 hours, consider discharge based on risk stratification via the HEART score (if in ED), or BRITTA risk score in IP/Observation      HS Troponin 99th Percentile URL of a Health Population=12 ng/L with a 95% Confidence Interval of 8-18 ng/L    B-TYPE NATRIURETIC PEPTIDE (BNP) - Abnormal     (*) " "0 - 100 pg/mL Final   HS TROPONIN I 2HR - Abnormal    hs TnI 2hr 69 (*) \"Refer to ACS Flowchart\"- see link ng/L Final    Comment:                                              Initial (time 0) result  If >=50 ng/L, Myocardial injury suggested ;  Type of myocardial injury and treatment strategy  to be determined  If 5-49 ng/L, a delta result at 2 hours and or 4 hours will be needed to further evaluate  If <4 ng/L, and chest pain has been >3 hours since onset, patient may qualify for discharge based on the HEART score in the ED  If <5 ng/L and <3hours since onset of chest pain, a delta result at 2 hours will be needed to further evaluate  HS Troponin 99th Percentile URL of a Health Population=12 ng/L with a 95% Confidence Interval of 8-18 ng/L  Second Troponin (time 2 hours)  If calculated delta >= 20 ng/L,  Myocardial injury suggested ; Type of myocardial injury and treatment strategy to be determined  If 5-49 ng/L and the calculated delta is 5-19 ng/L, consult medical service for evaluation  Continue evaluation for ischemia on ecg and other possible etiology and repeat hs troponin at 4 hours  If delta is <5 ng/L at 2 hours, consider discharge based on risk stratification via the HEART score (if in ED), or BRITTA risk score in IP/Observation  HS Troponin 99th Percentile URL of a Health Population=12 ng/L with a 95% Confidence Interval of 8-18 ng/L      Delta 2hr hsTnI -2  <20 ng/L Final     Time reflects when diagnosis was documented in both MDM as applicable and the Disposition within this note     Time User Action Codes Description Comment    5/31/2023 11:29 AM [place] Faustin Add [R07 9] Chest pain     5/31/2023 11:29 AM [place] Faustin Add [R06 02] Shortness of breath     5/31/2023 11:29 AM [place] Faustin Add [I10] HTN (hypertension)     5/31/2023 11:29 AM [place] Faustin Add [R51 9] Headache     5/31/2023 12:16 PM Cathlean Savers Add [I50 32] Chronic heart failure with preserved ejection " fraction (HFpEF) ([facility] [address] )     6/1/2023 12:46 PM Marino Elizondo Add [I10] Essential hypertension       ED Disposition     ED Disposition   Admit    Condition   Stable    Date/Time   Wed May 31, 2023 1129    Comment   Case was discussed with JUDY and the patient's admission status was agreed to be Admission Status: inpatient status to the service of Dr Dianna Gil             Follow-up Information     Follow up With Specialties Details Why 12 Flynn Moore MD Internal Medicine Follow up  [address] [facility] Main  [address] [facility], [address] [place] [address] Internal Medicine   [address] [facility]  [address] [address]  Kash Stein MD Family Medicine   [address] [facility]  [address] [facility] [address] [phone]          Discharge Medication List as of 6/1/2023  1:22 PM      START taking these medications    Details   amLODIPine (NORVASC) 5 mg tablet Take 1 tablet (5 mg total) by mouth daily Do not start before June 2, 2023 , Starting Fri 6/2/2023, Normal         CONTINUE these medications which have NOT CHANGED    Details   acetaminophen (TYLENOL) 500 mg tablet Take 2 tablets (1,000 mg total) by mouth every 6 (six) hours as needed for mild pain for up to 20 doses, Starting Tue 1/17/2023, Normal      Diclofenac Sodium (VOLTAREN) 1 % Apply 2 g topically every 6 (six) hours as needed (pain), Starting Tue 1/17/2023, Normal      doxazosin (CARDURA) 2 mg tablet take 1 tablet by mouth daily at bedtime, Normal      furosemide (LASIX) 40 mg tablet Take 1 tablet (40 mg total) by mouth daily, Starting Wed 5/17/2023, Normal      metoprolol succinate (TOPROL-XL) 50 mg 24 hr tablet Take 1 tablet (50 mg total) by mouth daily Do not start before May 13, 2023 , Starting Sat 5/13/2023, Until Mon 6/12/2023, Normal      nicotine (NICODERM CQ) 21 mg/24 hr TD 24 hr patch Place 1 patch on the skin over 24 hours daily Do not start before May 13, 2023 , Starting Sat 5/13/2023, No Print nystatin powder apply topically to affected area twice a day, Historical Med      pantoprazole (PROTONIX) 40 mg tablet take 1 tablet by mouth once daily, Normal      rivaroxaban (XARELTO) 15 mg tablet Take 1 tablet (15 mg total) by mouth every evening, Starting Thu 7/21/2022, Until Wed 5/31/2023, Normal         STOP taking these medications       potassium chloride (K-DUR,KLOR-CON) 10 mEq tablet Comments:   Reason for Stopping:             No discharge procedures on file  Prior to Admission Medications   Prescriptions Last Dose Informant Patient Reported? Taking? Diclofenac Sodium (VOLTAREN) 1 %  Self No Yes   Sig: Apply 2 g topically every 6 (six) hours as needed (pain)   acetaminophen (TYLENOL) 500 mg tablet  Self No Yes   Sig: Take 2 tablets (1,000 mg total) by mouth every 6 (six) hours as needed for mild pain for up to 20 doses   doxazosin (CARDURA) 2 mg tablet  Self No Yes   Sig: take 1 tablet by mouth daily at bedtime   furosemide (LASIX) 40 mg tablet  Self No Yes   Sig: Take 1 tablet (40 mg total) by mouth daily   metoprolol succinate (TOPROL-XL) 50 mg 24 hr tablet  Self No Yes   Sig: Take 1 tablet (50 mg total) by mouth daily Do not start before May 13, 2023  nicotine (NICODERM CQ) 21 mg/24 hr TD 24 hr patch Not Taking Self No No   Sig: Place 1 patch on the skin over 24 hours daily Do not start before May 13, 2023     Patient not taking: Reported on 5/17/2023   nystatin powder  Self Yes Yes   Sig: apply topically to affected area twice a day   pantoprazole (PROTONIX) 40 mg tablet  Self No Yes   Sig: take 1 tablet by mouth once daily   potassium chloride (K-DUR,KLOR-CON) 10 mEq tablet  Self No No   Sig: Take 2 tablets (20 mEq total) by mouth daily   Patient not taking: Reported on 5/31/2023   rivaroxaban (XARELTO) 15 mg tablet  Self No Yes   Sig: Take 1 tablet (15 mg total) by mouth every evening      Facility-Administered Medications: None     HEART Risk Score    Flowsheet Row Most Recent Value "  Heart Score Risk Calculator    History 1 Filed at: 05/31/2023 1129   ECG 1 Filed at: 05/31/2023 1129   Age 1 Filed at: 05/31/2023 1129   Risk Factors 1 Filed at: 05/31/2023 1129   Troponin 2 Filed at: 05/31/2023 1129   HEART Score 6 Filed at: 05/31/2023 1129                         Portions of the record may have been created with voice recognition software  Occasional wrong word or \"sound a like\" substitutions may have occurred due to the inherent limitations of voice recognition software  Read the chart carefully and recognize, using context, where substitutions have occurred      Electronically signed by:  Stanley Rivera  "

## 2023-05-31 NOTE — ASSESSMENT & PLAN NOTE
· Patient came to the hospital with elevated blood pressure  Patient also gives history of intermittent headache  · Elevated troponin noted  · Added as needed hydralazine  · Patient was on Cardura 2 mg/lisinopril 20 mg and metoprolol previously  · Lisinopril was discontinued during the previous hospital stay likely due to elevated creatinine  · Monitor blood pressure  · Added hydralazine /labetalol prn  · Patient was discharged on 1 mg of doxazosin    Will uptitrate to 2 mg-monitor with this change

## 2023-05-31 NOTE — ASSESSMENT & PLAN NOTE
· Patient gives intermittent headache  Likely related to high blood pressure    Monitor for now  · Nonfocal examination Yes

## 2023-05-31 NOTE — ASSESSMENT & PLAN NOTE
Lab Results   Component Value Date    CREATININE 1 79 (H) 05/31/2023    CREATININE 1 68 (H) 05/23/2023    CREATININE 2 14 (H) 05/16/2023    EGFR 30 05/31/2023    EGFR 33 05/23/2023    EGFR 24 05/16/2023   Creatinine is 1 79    Appears to be at baseline  Monitor creatinine  Patient follow-up with nephrology
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
## 2023-05-31 NOTE — ASSESSMENT & PLAN NOTE
· Continue with PPI  · Patient gives history of intermittent vomiting  · If the nausea and vomiting persistent will get GI to see the patient  · Continue with supportive care
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
## 2023-06-01 VITALS
HEART RATE: 80 BPM | OXYGEN SATURATION: 96 % | BODY MASS INDEX: 36.81 KG/M2 | DIASTOLIC BLOOD PRESSURE: 78 MMHG | SYSTOLIC BLOOD PRESSURE: 132 MMHG | RESPIRATION RATE: 17 BRPM | WEIGHT: 235.01 LBS | TEMPERATURE: 98 F

## 2023-06-01 LAB
ATRIAL RATE: 49 BPM
CARDIAC TROPONIN I PNL SERPL HS: 64 NG/L
P AXIS: 84 DEGREES
QRS AXIS: -75 DEGREES
QRSD INTERVAL: 196 MS
QT INTERVAL: 492 MS
QTC INTERVAL: 567 MS
T WAVE AXIS: 98 DEGREES
VENTRICULAR RATE: 80 BPM

## 2023-06-01 PROCEDURE — 93010 ELECTROCARDIOGRAM REPORT: CPT | Performed by: INTERNAL MEDICINE

## 2023-06-01 PROCEDURE — 93005 ELECTROCARDIOGRAM TRACING: CPT

## 2023-06-01 PROCEDURE — 99232 SBSQ HOSP IP/OBS MODERATE 35: CPT | Performed by: INTERNAL MEDICINE

## 2023-06-01 PROCEDURE — 84484 ASSAY OF TROPONIN QUANT: CPT

## 2023-06-01 PROCEDURE — 99239 HOSP IP/OBS DSCHRG MGMT >30: CPT | Performed by: INTERNAL MEDICINE

## 2023-06-01 RX ORDER — AMLODIPINE BESYLATE 5 MG/1
5 TABLET ORAL DAILY
Qty: 90 TABLET | Refills: 0 | Status: SHIPPED | OUTPATIENT
Start: 2023-06-02

## 2023-06-01 RX ADMIN — NICOTINE 1 PATCH: 21 PATCH, EXTENDED RELEASE TRANSDERMAL at 08:42

## 2023-06-01 RX ADMIN — FUROSEMIDE 40 MG: 40 TABLET ORAL at 08:42

## 2023-06-01 RX ADMIN — TRAMADOL HYDROCHLORIDE 50 MG: 50 TABLET, COATED ORAL at 04:15

## 2023-06-01 RX ADMIN — METOPROLOL SUCCINATE 50 MG: 50 TABLET, EXTENDED RELEASE ORAL at 08:40

## 2023-06-01 RX ADMIN — AMLODIPINE BESYLATE 5 MG: 5 TABLET ORAL at 08:42

## 2023-06-01 RX ADMIN — PANTOPRAZOLE SODIUM 40 MG: 40 TABLET, DELAYED RELEASE ORAL at 05:01

## 2023-06-01 NOTE — UTILIZATION REVIEW
Initial Clinical Review    Admission: Date/Time/Statement:   Admission Orders (From admission, onward)     Ordered        05/31/23 1130  INPATIENT ADMISSION  Once                      Orders Placed This Encounter   Procedures   • INPATIENT ADMISSION     Standing Status:   Standing     Number of Occurrences:   1     Order Specific Question:   Level of Care     Answer:   Med Surg [16]     Order Specific Question:   Estimated length of stay     Answer:   More than 2 Midnights     Order Specific Question:   Certification     Answer:   I certify that inpatient services are medically necessary for this patient for a duration of greater than two midnights  See H&P and MD Progress Notes for additional information about the patient's course of treatment  ED Arrival Information     Expected   5/31/2023 09:02    Arrival   5/31/2023 09:49    Acuity   Emergent            Means of arrival   Walk-In    Escorted by   Spouse    Service   Hospitalist    Admission type   Emergency            Arrival complaint   hypertensive urgency           Chief Complaint   Patient presents with   • Shortness of Breath     Seen at PCP's office- told to come to ER for High BP  Pt reports SOB and CP  Initial Presentation: 62 y o  female , presented to the ED @ [facility], from home via walk in with Spouse  Admitted as Inpatient due to  Hypertensive Urgency  Date:05/31/2023   Presents with persistently elevated blood pressure  Patient was recently admitted to the hospital from 5/9 to 5/12 with atrial flutter with RVR and required AV miguel junctional ablation on 5/30  During that admission patient noted to be acute on chronic congestive heart failure and diuresed with IV Lasix and was transitioned to oral Lasix  Patient was on Zestril and Cardizem previously and both of these medications were discontinued during the previous hospital stay  Patient reported that she has been compliant with Cardura and metoprolol    Patient also reported that at her home monitor blood pressure has been running as high as 200s recently  Patient was seen in the office by heart failure team and noted to have elevated blood pressure and was sent to the emergency room for further management  In the meantime patient was complaining of intermittent headache  Mainly in the frontal region and improves with Tylenol  Patient also gives history of dyspnea on exertion  Patient reported that she was able to walk around the block or so before she gets short of breath  Patient gives history of paroxysmal nocturnal dyspnea  Patient reported that she has to wake up 2-3 times at night due to feeling short of breath and has to sit up before she can go back to sleep  Patient reported that she has been using 1 pillow for sleeping and this has not changed recently  Patient also gives intermittent chest pain and reported that it is worse with activities  Patient gives history of intermittent nausea and vomiting  Patient reported that sometimes as soon as she eats she throws up but denies any trouble swallowing except for big pills  Admits to smoking couple of cigarettes and marijuana  Monitor on telemetry  Elevated troponin noted  Added as needed hydralazine  Patient was on Cardura 2 mg/lisinopril 20 mg and metoprolol previously  Lisinopril was discontinued during the previous hospital stay likely due to elevated creatinine  Monitor blood pressure  Added hydralazine /labetalol prn  Patient was discharged on 1 mg of doxazosin  Will uptitrate to 2 mg-monitor with this change  05/31/2023  Consult Cardio:    1  Hypertensive urgency   BP on admission was 210/142  Received hydralazine 10 mg IV x 1 dose at 1211  Last BP improved: 169/93  On Cardura 2 mg PO daily and metoprolol XL 50 mg PO daily  Will d/w attending regarding increasing cardura or adding amlodipine    2   Chronic HFimpEF  On furosemide 40 mg PO daily (home dose)   Weight on discharge 5/12 was 235 lbs Patient reports her weight today is 236 lbs   Monitor daily weights   I/Os  On 2 gram Na diet and 1800 ml fluid restriction   Reviewed HF education with patient including diet  Patient reports she eats hotdogs and canned vegetables     3  Atrial flutter/fibrillation   With history of ablation in 2015, afib ablation with PVI in 5/2020, atypical flutter and micro-reentrant atrial tachycardia ablation in 6/2022   Most recently AV node ablation with ICD reprogramming 5/10/2023  On Xarelto for [facility] [place] [place]    4  CKD stage IV   Creatinine 1 79   Baseline creatinine 1 6-2 0   5  History of monomorphic VT   S/p MDT DC ICD 7/2016  On metoprolol XL 50 mg PO daily    6  Tobacco abuse   Smokes 3 cigarettes a day currently   Continue nicotine patch    7  Prior cocaine use  Last use was 3/2023   8  Marijuana use   Daily use      Day 2: 06/01/2023   Started on amlodipine 5 mg PO daily yesterday  BP improved  BP this morning was 132/77  Continue fld restricted diet (1800 ml)  Monitor on telemetry  Continue xarelto          ED Triage Vitals   Temperature Pulse Respirations Blood Pressure SpO2   05/31/23 0954 05/31/23 0954 05/31/23 0954 05/31/23 0954 05/31/23 0954   98 4 °F (36 9 °C) 80 18 (!) 210/142 100 %      Temp Source Heart Rate Source Patient Position - Orthostatic VS BP Location FiO2 (%)   05/31/23 2045 05/31/23 1030 05/31/23 0954 05/31/23 0954 --   Oral Monitor Sitting Left arm       Pain Score       05/31/23 0954       6          Wt Readings from Last 1 Encounters:   06/01/23 107 kg (235 lb 0 2 oz)     Additional Vital Signs:   Date/Time Temp Pulse Resp BP MAP (mmHg) SpO2 O2 Device Patient Position - Orthostatic VS   06/01/23 0900 -- -- -- -- -- -- None (Room air) --   06/01/23 07:04:14 98 °F (36 7 °C) 80 -- 132/77 95 92 % -- --   06/01/23 03:41:19 98 2 °F (36 8 °C) 80 17 108/70 83 94 % -- --   06/01/23 03:38:49 98 2 °F (36 8 °C) 83 17 108/70 83 93 % None (Room air) Lying   06/01/23 01:25:09 -- 74 -- 123/74 90 97 % -- -- 05/31/23 23:49:59 98 5 °F (36 9 °C) 80 19 125/73 90 91 % -- --   05/31/23 21:08:10 -- 80 -- 137/95 109 95 % -- --   05/31/23 20:45:57 98 7 °F (37 1 °C) 81 16 136/95 109 95 % -- --   05/31/23 2000 -- -- -- -- -- -- None (Room air) --   05/31/23 16:43:05 98 °F (36 7 °C) 84 -- 179/118 Abnormal  138 96 % -- --   05/31/23 1634 -- -- -- -- -- 98 % None (Room air) --   05/31/23 1400 -- 80 21 174/103 Abnormal  132 98 % None (Room air) Sitting   05/31/23 1300 -- 78 27 Abnormal  179/103 Abnormal  129 99 % None (Room air) Sitting   05/31/23 1215 -- 78 22 169/93 123 97 % None (Room air) Sitting   05/31/23 1211 -- -- -- 169/93 -- -- -- --   05/31/23 1130 -- 80 23 Abnormal  181/97 Abnormal  129 99 % None (Room air) Sitting   05/31/23 1030 -- 78 34 Abnormal  200/96 Abnormal  138 97 % None (Room air) Lying     Date and Time Eye Opening Best Verbal Response Best Motor Response Lisa Coma Scale Score   06/01/23 0900 4 5 6 15   05/31/23 2000 4 5 6 15   05/31/23 1634 4 5 6 15   05/31/23 1008 4 5 6 15         Pertinent Labs/Diagnostic Test Results:   CT head wo contrast   Final Result by Katty Vieyra MD (05/31 1522)      No acute intracranial abnormality  XR chest 2 views   Final Result by Demian Chin MD (06/01 2418)      No acute cardiopulmonary disease          Results from last 7 days   Lab Units 05/31/23  1019   HEMATOCRIT % 36 2   HEMOGLOBIN g/dL 10 8*   NEUTROS ABS Thousands/µL 4 76   PLATELETS Thousands/uL 179   WBC Thousand/uL 6 61     Results from last 7 days   Lab Units 05/31/23  1019   ANION GAP mmol/L 0*   BUN mg/dL 18   CALCIUM mg/dL 9 2   CHLORIDE mmol/L 108   CO2 mmol/L 29   CREATININE mg/dL 1 79*   EGFR ml/min/1 73sq m 30   POTASSIUM mmol/L 4 0   SODIUM mmol/L 137     Results from last 7 days   Lab Units 05/31/23  1019   ALBUMIN g/dL 3 9   ALK PHOS U/L 72   ALT U/L 22   AST U/L 24   TOTAL BILIRUBIN mg/dL 0 40   TOTAL PROTEIN g/dL 8 3     Results from last 7 days   Lab Units 05/31/23  1019 GLUCOSE RANDOM mg/dL 91     Results from last 7 days   Lab Units 06/01/23  0205 05/31/23  1208 05/31/23  1019   HS TNI 0HR ng/L 64*  --  71*   HS TNI 2HR ng/L  --  69*  --    HSTNI D2 ng/L  --  -2  --      Results from last 7 days   Lab Units 05/31/23  1019   BNP pg/mL 467*     ED Treatment:   Medication Administration from 05/31/2023 0902 to 05/31/2023 1628       Date/Time Order Dose Route Action     05/31/2023 1115 EDT acetaminophen (TYLENOL) tablet 975 mg 975 mg Oral Given     05/31/2023 1211 EDT hydrALAZINE (APRESOLINE) injection 10 mg 10 mg Intravenous Given     05/31/2023 1331 EDT furosemide (LASIX) tablet 40 mg 40 mg Oral Given     05/31/2023 1331 EDT metoprolol succinate (TOPROL-XL) 24 hr tablet 50 mg 50 mg Oral Given     05/31/2023 1331 EDT pantoprazole (PROTONIX) EC tablet 40 mg 40 mg Oral Given     05/31/2023 1331 EDT docusate sodium (COLACE) capsule 100 mg 100 mg Oral Given     05/31/2023 1332 EDT polyethylene glycol (MIRALAX) packet 17 g 17 g Oral Given     05/31/2023 1356 EDT traMADol (ULTRAM) tablet 50 mg 50 mg Oral Given     05/31/2023 1543 EDT amLODIPine (NORVASC) tablet 5 mg 5 mg Oral Given        Past Medical History:   Diagnosis Date   • ACS (acute coronary syndrome) ([facility] ) 02/07/2021   • Acute on chronic diastolic CHF 27/59/4502   • Arthritis    • Atrial fibrillation (HCC)    • Atrial fibrillation with rapid ventricular response ([facility] ) 03/20/2016   • Breast lump    • CKD (chronic kidney disease) stage 3, GFR 30-59 ml/min ([facility])    • Disease of thyroid gland    • Elevated troponin 09/09/2019   • Epigastric abdominal tenderness 08/15/2021   • 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 ([facility] ) 05/25/2020   • GERD (gastroesophageal reflux disease)    • H/O transfusion 1987   • Hepatitis C     resolved   • History of tachycardia induced cardiomyopathy 05/2021    in setting of rapid atrial flutter in 05/2021 and again 06/2022   • History of ventricular tachycardia 07/2016    s/p ICD   • Hyperlipidemia    • Hypertension    • Inappropriate ICD discharge for atrial flutter 04/2023   • NSTEMI (non-ST elevated myocardial infarction) ([facility] ) 12/26/2018   • Sleep apnea     no cpap     Present on Admission:  • Gastroesophageal reflux disease   • Tobacco abuse  • Morbid obesity  • Headache  • History of tachycardia induced cardiomyopathy  • Chronic kidney disease stage 3   • Hypertensive urgency      Admitting Diagnosis: Shortness of breath [R06 02]  Chest pain [R07 9]  HTN (hypertension) [I10]  Hypertensive urgency [I16 0]  Headache [R51 9]  Chronic heart failure with preserved ejection fraction (HFpEF) ([facility]) [I50 32]  Age/Sex: 62 y o  female  Admission Orders:  Telemetry  CV diet w fld restriction 1800ml  Up & OOB as tolerated / Ambulate q8h  Daily weight I&O      Scheduled Medications:  amLODIPine, 5 mg, Oral, Daily  docusate sodium, 100 mg, Oral, BID  doxazosin, 2 mg, Oral, HS  furosemide, 40 mg, Oral, Daily  metoprolol succinate, 50 mg, Oral, Daily  nicotine, 1 patch, Transdermal, Daily  pantoprazole, 40 mg, Oral, Daily  polyethylene glycol, 17 g, Oral, Daily  rivaroxaban, 15 mg, Oral, QPM      Continuous IV Infusions:     PRN Meds:  acetaminophen, 488 mg, Oral, Q4H PRN  calcium carbonate, 1,000 mg, Oral, Daily PRN  hydrALAZINE, 10 mg, Intravenous, Q4H PRN  labetalol, 10 mg, Intravenous, Q4H PRN  ondansetron, 4 mg, Intravenous, Q6H PRN  simethicone, 80 mg, Oral, 4x Daily PRN  traMADol, 50 mg, Oral, Q6H PRN        IP CONSULT TO CARDIOLOGY    Network Utilization Review Department  ATTENTION: Please call with any questions or concerns to [phone] and carefully listen to the prompts so that you are directed to the right person  All voicemails are confidential   Dorota Dueñas all requests for admission clinical reviews, approved or denied determinations and any other requests to dedicated fax number below belonging to the campus where the patient is receiving treatment   List of dedicated fax numbers for the Facilities:  FACILITY NAME UR FAX NUMBER   ADMISSION DENIALS (Administrative/Medical Necessity) [phone]   [address]  (Maternity/NICU/Pediatrics) [phone]   [address] Katarina Kramer  [phone]   [address] [address] [facility] [address] [facility] [place] [address] [facility] [phone]

## 2023-06-01 NOTE — PROGRESS NOTES
General Cardiology   Progress Note -  [employer]   Melrose Cushing 62 y o  female MRN: [id number]    Unit/Bed#: [facility] 429-01 Encounter: [id number]    Assessment/ Plan    1  Hypertensive urgency   BP on admission was 210/142  Received hydralazine 10 mg IV x 1 dose on admission   On home medications: Cardura 2 mg PO daily and metoprolol XL 50 mg PO daily  Started on amlodipine 5 mg PO daily yesterday   BP improved  BP this morning was 132/77     2  Chronic HFimpEF  On furosemide 40 mg PO daily (home dose)   Weight on discharge 5/12 was 235 lbs   Monitor daily weights: 235 lbs   I/Os not documented   On 2 gram Na diet and 1800 ml fluid restriction   Reviewed HF education with patient including diet and daily weights       3  Atrial flutter/fibrillation   With history of ablation in 2015, afib ablation with PVI in 5/2020, atypical flutter and micro-reentrant atrial tachycardia ablation in 6/2022   Most recently AV node ablation with ICD reprogramming 5/10/2023  On Xarelto for [address]      4  CKD stage IV   Creatinine 1 79 yesterday   Baseline creatinine 1 6-2 0     5  History of monomorphic VT   S/p MDT DC ICD 7/2016  On metoprolol XL 50 mg PO daily      6  Tobacco abuse   Smokes 3 cigarettes a day currently   Continue nicotine patch      7  Prior cocaine use  Last use was 3/2023     8  Marijuana use   Daily use     Subjective  Patient reports she feels good today  No further headache  No dizziness, chest pain, SOB or palpitations  No fever or chills  Review of Systems   Constitutional: Negative for chills and fever  HENT: Negative for congestion  Cardiovascular: Negative for chest pain, dyspnea on exertion, leg swelling, orthopnea and palpitations  Respiratory: Negative for shortness of breath  Musculoskeletal: Negative for falls  Gastrointestinal: Negative for bloating, nausea and vomiting  Neurological: Negative for dizziness and light-headedness     Psychiatric/Behavioral: Negative for altered mental status  All other systems reviewed and are negative  Objective:   Vitals: Blood pressure 132/77, pulse 80, temperature 98 °F (36 7 °C), resp  rate 17, weight 107 kg (235 lb 0 2 oz), SpO2 92 %, not currently breastfeeding ,       Body mass index is 36 81 kg/m²  ,     Systolic (44EQL), YSX:540 , Min:108 , VEZ:798     Diastolic (69RKZ), XYA:14, Min:70, Max:142          Intake/Output Summary (Last 24 hours) at 6/1/2023 0855  Last data filed at 5/31/2023 2230  Gross per 24 hour   Intake 240 ml   Output --   Net 240 ml     Weight (last 2 days)     Date/Time Weight    06/01/23 0551 107 (235 01)            Telemetry Review: Paced     Physical Exam  Constitutional:       General: She is not in acute distress  Appearance: She is obese  HENT:      Head: Normocephalic  Mouth/Throat:      Mouth: Mucous membranes are moist    Cardiovascular:      Rate and Rhythm: Normal rate and regular rhythm  Pulses: Normal pulses  Pulmonary:      Effort: Pulmonary effort is normal  No respiratory distress  Breath sounds: Normal breath sounds  Abdominal:      General: Bowel sounds are normal       Palpations: Abdomen is soft  Musculoskeletal:         General: No swelling  Normal range of motion  Cervical back: Neck supple  Skin:     General: Skin is warm and dry  Capillary Refill: Capillary refill takes less than 2 seconds  Neurological:      General: No focal deficit present  Mental Status: She is alert and oriented to person, place, and time     Psychiatric:         Mood and Affect: Mood normal          LABORATORY RESULTS      CBC with diff:   Results from last 7 days   Lab Units 05/31/23  1019   HEMATOCRIT % 36 2   HEMOGLOBIN g/dL 10 8*   MCH pg 25 2*   MCHC g/dL 29 8*   MCV fL 85   MPV fL 11 8   NRBC AUTO /100 WBCs 0   PLATELETS Thousands/uL 179   RBC Million/uL 4 28   RDW % 15 3*   WBC Thousand/uL 6 61       CMP:  Results from last 7 days   Lab Units 05/31/23  1019   ALK PHOS U/L 72   ALT "U/L 22   AST U/L 24   BUN mg/dL 18   CALCIUM mg/dL 9 2   CHLORIDE mmol/L 108   CO2 mmol/L 29   CREATININE mg/dL 1 79*   EGFR ml/min/1 73sq m 30   POTASSIUM mmol/L 4 0       BMP:  Results from last 7 days   Lab Units 23  1019   BUN mg/dL 18   CALCIUM mg/dL 9 2   CHLORIDE mmol/L 108   CO2 mmol/L 29   CREATININE mg/dL 1 79*   POTASSIUM mmol/L 4 0       Lab Results   Component Value Date    NTBNP 9,053 (H) 2023    NTBNP 5,423 (H) 2023    NTBNP 16,909 (H) 2022                                     Lipid Profile:   No results found for: \"CHOL\"  Lab Results   Component Value Date    HDL 40 (L) 2023    HDL 42 (L) 2021    HDL 43 2020     Lab Results   Component Value Date    LDLCALC 68 2023    LDLCALC 57 2021    LDLCALC 72 2020     Lab Results   Component Value Date    TRIG 83 2023    TRIG 80 9 2021    TRIG 89 2020       Cardiac testing:   Results for orders placed during the hospital encounter of 21    Echo complete with contrast if indicated    [address]    Transthoracic Echocardiogram  2D, M-mode, Doppler, and Color Doppler    Study date:  25-May-2021    Patient: Chiki Fried  MR number: [id number]  Account number: [de-identified]  : 1965  Age: 64 years  Gender: Female  Status: Inpatient  Location: [facility]  Height: 67 in  Weight: 212 lb  BP: 118/ 62 mmHg    Indications: Afib    Diagnoses: I48 0 - Atrial fibrillation    Sonographer:  PRISCILLA Freeman  Referring Physician:  Cris Yadav MD  Group:  [facility]  Interpreting Physician:  Bria Banegas MD    SUMMARY    LEFT VENTRICLE:  Systolic function was moderately to markedly reduced  Ejection fraction was estimated to be 30 %  There was moderate diffuse hypokinesis  There was severe concentric hypertrophy  There was significant hypertrophy of the LV apex      RIGHT " VENTRICLE:  The size was normal   Systolic function was reduced  LEFT ATRIUM:  The atrium was dilated  HISTORY: PRIOR HISTORY: Cocaine abuse, Smoker, CKD III, Congestive heart failure  Hypertrophic cardiomyopathy  Atrial fibrillation  Risk factors: hypercholesterolemia  PRIOR PROCEDURES: ICD implantation  Arrhythmia ablation  PROCEDURE: The study was performed in the [facility]  This was a routine study  The transthoracic approach was used  The study included complete 2D imaging, M-mode, complete spectral Doppler, and color Doppler  The heart rate was 138  bpm, at the start of the study  Images were obtained from the parasternal, apical, subcostal, and suprasternal notch acoustic windows  Intravenous contrast (  6 mL Definity in NSS) was administered  Echocardiographic views were limited due  to poor acoustic window availability and lung interference  This was a technically difficult study  LEFT VENTRICLE: Size was normal  Systolic function was moderately to markedly reduced  Ejection fraction was estimated to be 30 %  There was moderate diffuse hypokinesis  Wall thickness was markedly increased  There was severe concentric  hypertrophy  There was significant hypertrophy of the LV apex  DOPPLER: Due to tachycardia, there was fusion of early and atrial contributions to ventricular filling  The study was not technically sufficient to allow evaluation of LV  diastolic function  RIGHT VENTRICLE: The size was normal  Systolic function was reduced  Wall thickness was normal  A pacing wire was present  LEFT ATRIUM: The atrium was dilated  RIGHT ATRIUM: Size was normal  A pacing wire was present  MITRAL VALVE: Valve structure was normal  There was normal leaflet separation  DOPPLER: The transmitral velocity was within the normal range  There was no evidence for stenosis  There was trace regurgitation  AORTIC VALVE: The valve was trileaflet   Leaflets exhibited normal thickness and normal cuspal separation  DOPPLER: Transaortic velocity was within the normal range  There was no evidence for stenosis  There was no significant  regurgitation  TRICUSPID VALVE: The valve structure was normal  There was normal leaflet separation  DOPPLER: The transtricuspid velocity was within the normal range  There was no evidence for stenosis  There was trace regurgitation  Pulmonary artery  systolic pressure was within the normal range  Estimated peak PA pressure was 21 mmHg  PULMONIC VALVE: Leaflets exhibited normal thickness, no calcification, and normal cuspal separation  DOPPLER: The transpulmonic velocity was within the normal range  There was trace regurgitation  PERICARDIUM: There was no pericardial effusion  The pericardium was normal in appearance  AORTA: The root exhibited normal size  SYSTEMIC VEINS: IVC: The inferior vena cava was normal in size   Respirophasic changes were normal     SYSTEM MEASUREMENT TABLES    2D  %FS: 27 49 %  Ao Diam: 2 77 cm  EDV(Teich): 57 94 ml  EF(Teich): 54 26 %  ESV(Teich): 26 5 ml  IVSd: 2 46 cm  LA Area: 26 06 cm2  LA Diam: 4 27 cm  LVIDd: 3 7 cm  LVIDs: 2 68 cm  LVPWd: 1 79 cm  RA Area: 18 49 cm2  RVIDd: 3 01 cm  RWT: 0 97  SV(Teich): 31 44 ml    CW  TR Vmax: 2 14 m/s  TR maxP 28 mmHg    MM  TAPSE: 1 51 cm    [employer] Accredited Echocardiography Laboratory    Prepared and electronically signed by    Wendall Gowers, MD  Signed [id number] 14:44:53    Results for orders placed during the hospital encounter of 20    HUBER    Narrative  [address]  [phone]    Transesophageal Echocardiogram  2D, Doppler, and Color Doppler    Study date:  20-May-2020    Patient: Lisbet Ramirez  MR number: [id number]  Account number: [de-identified]  : 1965  Age: 54 years  Gender: Female  Status: Outpatient  Location: Cath lab  Height: 67 in  Weight: 221 lb  BP:    Indications: "AFIB    Diagnoses: I48 0 - Atrial fibrillation    Sonographer:  PRISCILLA Mendoza Asp  Interpreting Physician:  Dg Hdz MD  Primary Physician:  Lawyer Chip MD  Referring Physician:  Dg Hdz MD  Group:  [facility]    SUMMARY    LEFT VENTRICLE:  Systolic function was normal  Ejection fraction was estimated to be 60 %  Wall thickness was moderately increased  There was moderate concentric hypertrophy  LEFT ATRIUM:  The atrium was mildly dilated  LEFT ATRIAL APPENDAGE:  The size was normal   The function was normal (normal emptying velocity)  No thrombus was identified  ATRIAL SEPTUM:  No defect or patent foramen ovale was identified  HISTORY: PRIOR HISTORY: AFIB, PPM, MI, HOCM, HTN, HLD, CKD III, Smoker, Cocaine abuse    PROCEDURE: The procedure was performed in the catheterization laboratory  This was a routine study  The risks and alternatives of the procedure were explained to the patient and informed consent was obtained  The transesophageal approach  was used  The study included complete 2D imaging, complete spectral Doppler, and color Doppler  An adult omniplane probe was inserted by the attending cardiologist  Intubated with ease  One intubation attempt(s)  There was no blood  detected on the probe  Image quality was adequate  MEDICATIONS: Anesthesia  LEFT VENTRICLE: Size was normal  Systolic function was normal  Ejection fraction was estimated to be 60 %  Wall thickness was moderately increased  There was moderate concentric hypertrophy  RIGHT VENTRICLE: The size was normal  Systolic function was normal  Wall thickness was normal  A pacing wire was present  LEFT ATRIUM: The atrium was mildly dilated  No mass was present  There was no spontaneous echo contrast (\"smoke\")  APPENDAGE: The size was normal  No thrombus was identified  DOPPLER: The function was normal (normal emptying velocity)      ATRIAL SEPTUM: No defect or patent foramen ovale was " identified  RIGHT ATRIUM: Size was normal  No thrombus was identified  MITRAL VALVE: Valve structure was normal  There was normal leaflet separation  There was no echocardiographic evidence of vegetation  DOPPLER: There was no regurgitation  AORTIC VALVE: The valve was trileaflet  Leaflets exhibited normal thickness and normal cuspal separation  There was no echocardiographic evidence of vegetation  DOPPLER: There was no regurgitation  TRICUSPID VALVE: The valve structure was normal  There was normal leaflet separation  There was no echocardiographic evidence of vegetation  DOPPLER: There was no regurgitation  PERICARDIUM: There was no pericardial effusion  The pericardium was normal in appearance  [address] Accredited Echocardiography Laboratory    Prepared and electronically signed by    Cara Zeng MD  Signed [id number] 09:25:57    No results found for this or any previous visit  No valid procedures specified  No results found for this or any previous visit      Meds/Allergies   all current active meds have been reviewed and current meds:   Current Facility-Administered Medications   Medication Dose Route Frequency   • acetaminophen (TYLENOL) tablet 488 mg  488 mg Oral Q4H PRN   • amLODIPine (NORVASC) tablet 5 mg  5 mg Oral Daily   • calcium carbonate (TUMS) chewable tablet 1,000 mg  1,000 mg Oral Daily PRN   • docusate sodium (COLACE) capsule 100 mg  100 mg Oral BID   • doxazosin (CARDURA) tablet 2 mg  2 mg Oral HS   • furosemide (LASIX) tablet 40 mg  40 mg Oral Daily   • hydrALAZINE (APRESOLINE) injection 10 mg  10 mg Intravenous Q4H PRN   • labetalol (NORMODYNE) injection 10 mg  10 mg Intravenous Q4H PRN   • metoprolol succinate (TOPROL-XL) 24 hr tablet 50 mg  50 mg Oral Daily   • nicotine (NICODERM CQ) 21 mg/24 hr TD 24 hr patch 1 patch  1 patch Transdermal Daily   • ondansetron (ZOFRAN) injection 4 mg  4 mg Intravenous Q6H PRN   • pantoprazole (PROTONIX) EC tablet 40 mg  40 mg Oral Daily   • polyethylene glycol (MIRALAX) packet 17 g  17 g Oral Daily   • rivaroxaban (XARELTO) tablet 15 mg  15 mg Oral QPM   • simethicone (MYLICON) chewable tablet 80 mg  80 mg Oral 4x Daily PRN   • traMADol (ULTRAM) tablet 50 mg  50 mg Oral Q6H PRN     Medications Prior to Admission   Medication   • acetaminophen (TYLENOL) 500 mg tablet   • Diclofenac Sodium (VOLTAREN) 1 %   • doxazosin (CARDURA) 2 mg tablet   • furosemide (LASIX) 40 mg tablet   • metoprolol succinate (TOPROL-XL) 50 mg 24 hr tablet   • nystatin powder   • pantoprazole (PROTONIX) 40 mg tablet   • rivaroxaban (XARELTO) 15 mg tablet   • nicotine (NICODERM CQ) 21 mg/24 hr TD 24 hr patch   • potassium chloride (K-DUR,KLOR-CON) 10 mEq tablet     Counseling / Coordination of Care  Total floor / unit time spent today 20 minutes  Greater than 50% of total time was spent with the patient and / or family counseling and / or coordination of care  ** Please Note: Dragon 360 Dictation voice to text software may have been used in the creation of this document   **

## 2023-06-02 NOTE — UTILIZATION REVIEW
NOTIFICATION OF ADMISSION DISCHARGE   This is a Notification of Discharge from [facility]  Please be advised that this patient has been discharge from our facility  Below you will find the admission and discharge date and time including the patient’s disposition  UTILIZATION REVIEW CONTACT:  Harini Pompa  Utilization   Network Utilization Review Department  Phone: [phone] x carefully listen to the prompts  All voicemails are confidential   Email: [email or web address]     ADMISSION INFORMATION  PRESENTATION DATE: 5/31/2023  9:55 AM  OBERVATION ADMISSION DATE:  INPATIENT ADMISSION DATE: 5/31/23 11:30 AM   DISCHARGE DATE: 6/1/2023  2:29 PM   DISPOSITION:Home/Self Care    IMPORTANT INFORMATION:  Send all requests for admission clinical reviews, approved or denied determinations and any other requests to dedicated fax number below belonging to the campus where the patient is receiving treatment   List of dedicated fax numbers:  [address] DENIALS (Administrative/Medical Necessity) [phone]   [address] (Maternity/NICU/Pediatrics) [phone]   [facility] [phone]   [place] [address] [phone]   [address] [phone]   220 [facility] [phone] 90 [facility] [phone]   [address] [place] [address] [phone]   [facility]  [phone]   [facility] [phone]   412 [facility] [address] [facility] [phone]

## 2023-06-08 NOTE — DISCHARGE SUMMARY
[address] [facility]  Discharge- Gwen Castillo 1965, 62 y o  female MRN: [id number]  Unit/Bed#: [facility] 429-01 Encounter: [id number]  Primary Care Provider: Lolita Leija MD   Date and time admitted to hospital: 5/31/2023  9:55 AM    History of tachycardia induced cardiomyopathy  Assessment & Plan  · Patient with history of tachycardia induced cardiomyopathy  · His recent ejection fraction 65% on 5/10/2023  Normal LV diastolic function  · Patient gives history of PND/dyspnea on exertion reports that she can walk a block before she gets short of  breath   · Slightly elevated BNP noted  · Patient not appear to be overtly fluid overloaded on exam  · Currently on 40 mg of Lasix daily  · Continue with the current Lasix dosing  · Monitor intake and output/daily weights  · Patient weighs 236 pounds in cardiology office today, discharge weight on 5/12 is 235 pounds    Anemia  Assessment & Plan  · Patient with chronic anemia  Likely secondary to CKD  · Hemoglobin 10 8    Morbid obesity  Assessment & Plan  · TLC as outpatient    Chronic kidney disease stage 3   Assessment & Plan  Lab Results   Component Value Date    CREATININE 1 79 (H) 05/31/2023    CREATININE 1 68 (H) 05/23/2023    CREATININE 2 14 (H) 05/16/2023    EGFR 30 05/31/2023    EGFR 33 05/23/2023    EGFR 24 05/16/2023   Creatinine is 1 79  Appears to be at baseline  Monitor creatinine  Patient follow-up with nephrology    Elevated troponin  Assessment & Plan  · Patient has history of dyspnea on exertion and chest pain mainly exertional in nature  · Mild elevated troponin probably related to hypertension/ckd  · Trend for now  · Monitor on telemetry  · EKG is paced rhythm  · Cardiology evaluation    Tobacco abuse  Assessment & Plan  · Nicotine patch  · Cessation advised strongly    Headache  Assessment & Plan  · Patient with  intermittent headache  Likely related to high blood pressure    Monitor for now  · Nonfocal examination    History of monomorphic ventricular tachycardia, s/p ICD in 2016  Assessment & Plan  · Status post AV miguel ablation in 5/10/2023  Currently in paced rhythm    Gastroesophageal reflux disease   Assessment & Plan  · Continue with PPI  · Patient gives history of intermittent vomiting  · Continue with supportive care    * Hypertensive urgency  Assessment & Plan  · Patient came to the hospital with elevated blood pressure  Patient also gives history of intermittent headache  · Elevated troponin noted  · Added as needed hydralazine  · Patient was on Cardura 2 mg/lisinopril 20 mg and metoprolol previously  · Lisinopril was discontinued during the previous hospital stay likely due to elevated creatinine  · Monitor blood pressure  · Added hydralazine /labetalol prn  · Patient was discharged on 1 mg of doxazosin  Will uptitrate to 2 mg-monitor with this change     Blood pressure normalized today  Patient instructed to check BP everyday and record in log book      Hypertensive emergency-resolved as of 5/31/2023  Assessment & Plan            Medical Problems     Resolved Problems  Date Reviewed: 6/8/2023          Resolved    Hypertensive emergency 5/31/2023     Resolved by  Tanja Licea MD        Discharging Physician / Practitioner: Mukund Ryder DO  PCP: Charbel Gutierrez MD  Admission Date:   Admission Orders (From admission, onward)     Ordered        05/31/23 1130  INPATIENT ADMISSION  Once                      Discharge Date: 06/01/23  Consultations During Hospital Stay:  · cards    Procedures Performed:   · none    Significant Findings / Test Results:   CT head wo contrast   Final Result by Meredith Conway MD (05/31 1522)      No acute intracranial abnormality  Workstation performed: [id number]         XR chest 2 views   Final Result by Crispin White MD (06/01 5601)      No acute cardiopulmonary disease                    Workstation performed: [id number] ·     Incidental Findings:   ·    · I reviewed the above mentioned incidental findings with the patient and/or family and they expressed understanding  Test Results Pending at Discharge (will require follow up):   · none     Outpatient Tests Requested:  · none    Complications:      Reason for Admission:     Hospital Course:   Faiza Guzman is a 62 y o  female patient who originally presented to the hospital on 5/31/2023 due to hypertensive urgency  She improved with initation of amlodipine 5mg  She was discharged on cardura 2mg daily, metoprolol xl 50mg daily and amlodipine 5mg daily    Please see above list of diagnoses and related plan for additional information  Condition at Discharge: stable    Discharge Day Visit / Exam:   Subjective:  Patient denies any acute complaints  Vitals: Blood Pressure: 132/78 (06/01/23 1055)  Pulse: 80 (06/01/23 1055)  Temperature: 98 °F (36 7 °C) (06/01/23 1055)  Temp Source: Oral (06/01/23 0338)  Respirations: 17 (06/01/23 0341)  Weight - Scale: 107 kg (235 lb 0 2 oz) (06/01/23 0551)  SpO2: 96 % (06/01/23 1055)  Exam:   Physical Exam  Vitals and nursing note reviewed  Constitutional:       General: She is not in acute distress  Appearance: She is well-developed  She is not toxic-appearing or diaphoretic  HENT:      Head: Normocephalic and atraumatic  Eyes:      General: No scleral icterus  Conjunctiva/sclera: Conjunctivae normal    Cardiovascular:      Rate and Rhythm: Normal rate and regular rhythm  Heart sounds: No murmur heard  No friction rub  No gallop  Pulmonary:      Effort: Pulmonary effort is normal  No respiratory distress  Breath sounds: Normal breath sounds  No stridor  No wheezing, rhonchi or rales  Chest:      Chest wall: No tenderness  Abdominal:      General: There is no distension  Palpations: Abdomen is soft  There is no mass  Tenderness: There is no abdominal tenderness  There is no guarding or rebound  Hernia: No hernia is present  Musculoskeletal:         General: No swelling or tenderness  Cervical back: Neck supple  Skin:     General: Skin is warm and dry  Capillary Refill: Capillary refill takes less than 2 seconds  Neurological:      Mental Status: She is alert and oriented to person, place, and time  Psychiatric:         Mood and Affect: Mood normal           Discussion with Family: Patient declined call to   Discharge instructions/Information to patient and family:   See after visit summary for information provided to patient and family  Provisions for Follow-Up Care:  See after visit summary for information related to follow-up care and any pertinent home health orders  Disposition:   Home    Planned Readmission: no     Discharge Statement:  I spent 35 minutes discharging the patient  This time was spent on the day of discharge  I had direct contact with the patient on the day of discharge  Greater than 50% of the total time was spent examining patient, answering all patient questions, arranging and discussing plan of care with patient as well as directly providing post-discharge instructions  Additional time then spent on discharge activities  Discharge Medications:  See after visit summary for reconciled discharge medications provided to patient and/or family        **Please Note: This note may have been constructed using a voice recognition system**

## 2023-06-08 NOTE — ASSESSMENT & PLAN NOTE
· Continue with PPI  · Patient gives history of intermittent vomiting  · Continue with supportive care

## 2023-06-08 NOTE — ASSESSMENT & PLAN NOTE
· Patient came to the hospital with elevated blood pressure  Patient also gives history of intermittent headache  · Elevated troponin noted  · Added as needed hydralazine  · Patient was on Cardura 2 mg/lisinopril 20 mg and metoprolol previously  · Lisinopril was discontinued during the previous hospital stay likely due to elevated creatinine  · Monitor blood pressure  · Added hydralazine /labetalol prn  · Patient was discharged on 1 mg of doxazosin  Will uptitrate to 2 mg-monitor with this change     Blood pressure normalized today   Patient instructed to check BP everyday and record in log book

## 2023-06-08 NOTE — ASSESSMENT & PLAN NOTE
· Patient with  intermittent headache  Likely related to high blood pressure    Monitor for now  · Nonfocal examination

## 2023-06-08 NOTE — ASSESSMENT & PLAN NOTE
· Patient with history of tachycardia induced cardiomyopathy  · His recent ejection fraction 65% on 5/10/2023    Normal LV diastolic function  · Patient gives history of PND/dyspnea on exertion reports that she can walk a block before she gets short of  breath   · Slightly elevated BNP noted  · Patient not appear to be overtly fluid overloaded on exam  · Currently on 40 mg of Lasix daily  · Continue with the current Lasix dosing  · Monitor intake and output/daily weights  · Patient weighs 236 pounds in cardiology office today, discharge weight on 5/12 is 235 pounds

## 2023-06-09 ENCOUNTER — HOSPITAL ENCOUNTER (EMERGENCY)
Facility: HOSPITAL | Age: 58
Discharge: HOME | End: 2023-06-09
Attending: EMERGENCY MEDICINE
Payer: COMMERCIAL

## 2023-06-09 ENCOUNTER — APPOINTMENT (EMERGENCY)
Dept: RADIOLOGY | Facility: HOSPITAL | Age: 58
End: 2023-06-09
Payer: COMMERCIAL

## 2023-06-09 VITALS
TEMPERATURE: 98 F | RESPIRATION RATE: 20 BRPM | SYSTOLIC BLOOD PRESSURE: 135 MMHG | HEIGHT: 67 IN | BODY MASS INDEX: 36.57 KG/M2 | HEART RATE: 98 BPM | WEIGHT: 233 LBS | DIASTOLIC BLOOD PRESSURE: 87 MMHG | OXYGEN SATURATION: 100 %

## 2023-06-09 DIAGNOSIS — S46.912A STRAIN OF LEFT SHOULDER, INITIAL ENCOUNTER: Primary | ICD-10-CM

## 2023-06-09 PROCEDURE — 99284 EMERGENCY DEPT VISIT MOD MDM: CPT

## 2023-06-09 PROCEDURE — 73030 X-RAY EXAM OF SHOULDER: CPT

## 2023-06-09 RX ORDER — LIDOCAINE 50 MG/G
1 PATCH TOPICAL ONCE
Status: DISCONTINUED | OUTPATIENT
Start: 2023-06-09 | End: 2023-06-09 | Stop reason: HOSPADM

## 2023-06-09 RX ORDER — LIDOCAINE 50 MG/G
1 PATCH TOPICAL DAILY
Qty: 15 PATCH | Refills: 0 | Status: SHIPPED | OUTPATIENT
Start: 2023-06-09

## 2023-06-09 RX ADMIN — LIDOCAINE 5% 1 PATCH: 700 PATCH TOPICAL at 15:20
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
## 2023-06-09 NOTE — ED NOTES
D/c reviewed with pt  Pt verbalized understanding and has no further questions at this time  Pt ambulatory off unit with steady gait       [id number] Juan Moore RN  06/09/23 5206

## 2023-06-13 ENCOUNTER — HOSPITAL ENCOUNTER (EMERGENCY)
Facility: HOSPITAL | Age: 58
Discharge: HOME | End: 2023-06-13
Attending: EMERGENCY MEDICINE | Admitting: EMERGENCY MEDICINE
Payer: COMMERCIAL

## 2023-06-13 VITALS
HEART RATE: 79 BPM | TEMPERATURE: 98.8 F | SYSTOLIC BLOOD PRESSURE: 175 MMHG | RESPIRATION RATE: 18 BRPM | OXYGEN SATURATION: 99 % | DIASTOLIC BLOOD PRESSURE: 93 MMHG

## 2023-06-13 DIAGNOSIS — J30.9 ALLERGIC RHINOSINUSITIS: Primary | ICD-10-CM

## 2023-06-13 DIAGNOSIS — H65.92 MIDDLE EAR EFFUSION, LEFT: ICD-10-CM

## 2023-06-13 RX ORDER — FLUTICASONE PROPIONATE 50 MCG
1 SPRAY, SUSPENSION (ML) NASAL DAILY
Qty: 16 G | Refills: 0 | Status: SHIPPED | OUTPATIENT
Start: 2023-06-13 | End: 2023-06-27

## 2023-06-13 RX ORDER — CETIRIZINE HYDROCHLORIDE 10 MG/1
10 TABLET ORAL DAILY
Qty: 30 TABLET | Refills: 0 | Status: SHIPPED | OUTPATIENT
Start: 2023-06-13

## 2023-06-13 RX ORDER — ACETAMINOPHEN 325 MG/1
650 TABLET ORAL ONCE
Status: COMPLETED | OUTPATIENT
Start: 2023-06-13 | End: 2023-06-13

## 2023-06-13 RX ORDER — FLUTICASONE PROPIONATE 50 MCG
1 SPRAY, SUSPENSION (ML) NASAL ONCE
Status: COMPLETED | OUTPATIENT
Start: 2023-06-13 | End: 2023-06-13

## 2023-06-13 RX ORDER — LORATADINE 10 MG/1
10 TABLET ORAL ONCE
Status: COMPLETED | OUTPATIENT
Start: 2023-06-13 | End: 2023-06-13

## 2023-06-13 RX ADMIN — ACETAMINOPHEN 650 MG: 325 TABLET, FILM COATED ORAL at 19:36

## 2023-06-13 RX ADMIN — LORATADINE 10 MG: 10 TABLET ORAL at 19:36

## 2023-06-13 RX ADMIN — FLUTICASONE PROPIONATE 1 SPRAY: 50 SPRAY, METERED NASAL at 19:36

## 2023-06-13 NOTE — DISCHARGE INSTRUCTIONS
You have been prescribed a steroid nasal spray and antihistamine medication that you can take for your symptoms  If you feel better after about 1 week or so, you do not have to continue taking them if you do not need them  You can make an appointment with your regular doctor in the next 2 weeks for recheck of your symptoms
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
## 2023-06-19 ENCOUNTER — IN-CLINIC DEVICE VISIT (OUTPATIENT)
Dept: CARDIOLOGY CLINIC | Facility: CLINIC | Age: 58
End: 2023-06-19
Payer: COMMERCIAL

## 2023-06-19 ENCOUNTER — OFFICE VISIT (OUTPATIENT)
Dept: CARDIOLOGY CLINIC | Facility: CLINIC | Age: 58
End: 2023-06-19
Payer: COMMERCIAL

## 2023-06-19 VITALS — BODY MASS INDEX: 36.49 KG/M2 | DIASTOLIC BLOOD PRESSURE: 70 MMHG | HEIGHT: 67 IN | SYSTOLIC BLOOD PRESSURE: 112 MMHG

## 2023-06-19 DIAGNOSIS — I48.0 PAROXYSMAL A-FIB (HCC): Primary | ICD-10-CM

## 2023-06-19 DIAGNOSIS — Z95.810 PRESENCE OF AUTOMATIC CARDIOVERTER/DEFIBRILLATOR (AICD): Primary | ICD-10-CM

## 2023-06-19 PROCEDURE — 93283 PRGRMG EVAL IMPLANTABLE DFB: CPT | Performed by: INTERNAL MEDICINE

## 2023-06-19 PROCEDURE — 99214 OFFICE O/P EST MOD 30 MIN: CPT | Performed by: INTERNAL MEDICINE

## 2023-06-19 PROCEDURE — 93000 ELECTROCARDIOGRAM COMPLETE: CPT | Performed by: INTERNAL MEDICINE

## 2023-06-19 NOTE — PROGRESS NOTES
Results for orders placed or performed in visit on 06/19/23   Cardiac EP device report    Narrative    MDT-DUAL CHAMBER ICD (AAI-DDD MODE) - ACTIVE SYSTEM IS MRI CONDITIONAL  DEVICE INTERROGATED IN THE [place] OFFICE  BATTERY VOLTAGE ADEQUATE (17 MTHS)  AP 0%  98% (>40%/VVI 80 BPM) ALL LEAD PARAMETERS WITHIN NORMAL LIMITS  573 AT/AF EPISODES WITH LONGEST >23 HRS  AT/AF BURDEN 41 1% SINCE 5/10/23  TAKES XARELTO & METOPROLOL SUCC  NO PROGRAMMING CHANGES MADE TO DEVICE PARAMETERS  OPTI-VOL WITHIN NORMAL LIMITS  PT SEEING AS-PA TODAY  NORMAL DEVICE FUNCTION   AM/LEONARDO

## 2023-06-19 NOTE — PROGRESS NOTES
Electrophysiology Hospital Follow Up  [address] [facility]  [facility], [address]    Name: Dallas Alarcon  : 1965  MRN: [id number]    ASSESSMENT:  1  Paroxysmal A-fib (HCC)            PLAN:  1  Persistent atrial flutter with rapid ventricular response, now status post AV node ablation 5/10/2023              A ) known atrial fibrillation with prior cryoablation with pulmonary vein isolation 2020, no note of typical flutter line performed at that time              B ) amiodarone started 2021 when she presented with tachy-mediated cardiomyopathy and increased afib burden with breakthrough despite amiodarone antiarrhythmic therapy and cessation of this therapy stopped 2023 (felt to be proarrhythmic)              C ) on Xarelto anticoagulation              D )  Underwent ablation of roof flutter line and Micra reentrant atrial tachycardia by Dr Hennessy 2022              E )  Prior inappropriate shock for atrial flutter 2023              F )  Rate control medication to be cut down after AVJ - no diltiazem and metoprolol lowered (see #5)  2  Hypertrophic cardiomyopathy  3  Chronic HFpEF  4  Prior tachy-mediated cardiomyopathy with now recovered               Y ) echo this year showing EF of 60%              B ) previously 30% per echo 2021 in the setting of increased burden of rapid afib              C ) no obstructive CAD per cardiac cath 2019  5  Prior ventricular tachycardia in 2016, status post secondary prevention Med[employer]onic dual chamber ICD              A )  Beta blocker therapy of metoprolol succinate  6  Hypertension   A ) recent admission for hypertensive urgency - now maintained on amlodipine, metoprolol succinate 50mg, and doxazosin  7  CKD 3  8  Tobacco abuse  9  Obesity with BMI 32      IMPRESSION:  1  AVJ - Patient appears overall to be doing well since her hospitalization when she underwent AV node ablation   Her rate has been lowered today to VVI70 and we will see her back in a couple months to lower it once more to 60  She does not appear to be exhibiting signs of CHF (was concern given wide pacing complex)  Will keep an eye on this and order repeat echo if symptoms do arise  Patient is to follow up in our EP office in 2-3 months  She is to call our office with any further questions or concerns in the meantime  HPI:   Interim history: Vidal Nino is a 62 y o  female with paroxysmal atrial fibrillation anticoagulated with Xarelto, monomorphic ventricular tachycardia status post ICD, hypertrophic cardiomyopathy, essential hypertension, chronic combined systolic and diastolic congestive heart failure, SURINDER compliant with CPAP, and remote history of cocaine use  She presents today for hospital follow after AV node ablation      She follows with Dr Anne Elkins as an outpatient and has carried the diagnosis of atrial fibrillation since at least 2014   She did have a cardioversion and loop recorder implantation in March of 2016 by Dr Sandeep De Santiago as far back as 2016, she has had history of RVR for which she is highly symptomatic   She reports having undergone an ablation in 2015   In 2016 she had life-threatening VT seen on loop recorder with syncope   It was noted that atrial fibrillation had led to her VT   She presented to the hospital and underwent loop recorder removal and ICD implantation 7/13/2016  Ethelyn Nataliia had been added as she did have bradycardia as well      She had been stable until December 2018 at which point she presented back to the hospital in atrial fibrillation with RVR and elevated troponin   She did have a prior nuclear stress test that was abnormal and no records from [facility] of her prior cardiac catheterization, it was recommended she underwent repeat carry a catheterization in January 2019 that showed no obstructive CAD   In September of that same year, she did have worsening palpitations for which when she was in the hospital she did have PVC seen on telemetry   She also had paroxysms of atrial fibrillation around this time seen on her device        Eventually, she was seen again by Dr Abad Renae virtually in March of 2020 to further discuss her atrial fibrillation   It was felt that some of her episodes of RVR or possibly cocaine related with her U tox being positive   Recommendation for atrial fibrillation ablation was given after 6 weeks to determine if she could be compliant with anticoagulation   Unfortunately, she was back in the hospital less than a week later due to RVR and had been placed on IV amiodarone for this which had converted back to sinus   Therefore her ablation was expedited and she underwent pulmonary vein isolation by Dr Abad Renae on 5/20/2020   Amiodarone continue to be loaded in the outpatient setting afterwards   Interestingly, in follow-up a couple months later after her ablation amiodarone had been discontinued      She had been attempted to establish which with the hypertrophic cardiomyopathy Clinic but has not shown for her appointments      At the end of May 2021, she had another hospitalization for atrial fibrillation with RVR at which point a repeat echo was obtained that showed an ejection fraction of 30% which was down from 60%    As she did convert to sinus rhythm while in hospital, she was sent home and it was recommended that she have close follow-up with EP for which amiodarone was continued  She did establish with heart failure and was placed on Entresto      Did have another hospitalization in January 2022 for atrial flutter converted on IV amiodarone  She then started again with atrial flutter in April she did not respond to up titration of rate control medications    For she presented to the emergency room and subsequently underwent ablation of atypical left atrial flutter using the roof along with left atrial septum micro reentrant atrial tachycardia by Dr Li Hernadez on 6/22/2022  She tolerated the procedure well  Since then she has had more hospitalizations for atrial flutter, first in October 2022 and subsequently in December 2022  When she followed up with our office in January she was still in flutter and sent to hospital for consideration of either third ablation or AV node  At that time amiodarone was discontinued to see if this was exacerbating atrial flutter and sent home  In follow-up in February she was found to be in sinus rhythm  She had another episode in February of atrial flutter      More recently she presented back to [facility] on 5/8 for atrial flutter with RVR and electrophysiology was reached out to about transfer      Patient feels that she has had issues with this for couple months  Interrogation of her device does show that she started with atrial flutter 5/1 and has been in it since then  She has not had further shocks from her device since April when she was shocked for inappropriately for atrial flutter  She was seen by our team during hospitalization in May at which point AVJ was recommended and she underwent  Diltiazem was discontinued and metoprolol lowered  She did have pseudo study afterwards that ruled out pseudo, AV fistula and she reports that the pain has subsided from that  She does admit to some lightheadedness about twice a day  Her blood pressures have been better at home running the 055'E systolic  She is currently staying with an uncle  EKG: ventricular paced rhythm at 100 beats per minute with underlying afib/flutter with QRS width of 194msec    ROS:   Review of Systems   Constitutional: Negative for chills, diaphoresis, fever and malaise/fatigue  Cardiovascular: Negative for chest pain, claudication, cyanosis, dyspnea on exertion, irregular heartbeat, leg swelling, near-syncope, orthopnea, palpitations, paroxysmal nocturnal dyspnea and syncope     Respiratory: Negative for shortness of breath and sleep disturbances "due to breathing  Neurological: Positive for dizziness and light-headedness  All other systems reviewed and are negative  OBJECTIVE:   Vitals:   /70 (BP Location: Left arm, Patient Position: Sitting, Cuff Size: Standard)   Ht 5' 7\" (1 702 m)   BMI 36 49 kg/m²       Physical Exam:   Physical Exam  Vitals and nursing note reviewed  Constitutional:       General: She is not in acute distress  Appearance: She is well-developed  She is not diaphoretic  HENT:      Head: Normocephalic and atraumatic  Eyes:      Pupils: Pupils are equal, round, and reactive to light  Neck:      Vascular: No JVD  Cardiovascular:      Rate and Rhythm: Normal rate and regular rhythm  Heart sounds: No murmur heard  No friction rub  No gallop  Pulmonary:      Effort: Pulmonary effort is normal  No respiratory distress  Breath sounds: Normal breath sounds  No wheezing  Abdominal:      Palpations: Abdomen is soft  Musculoskeletal:         General: Normal range of motion  Cervical back: Normal range of motion  Skin:     General: Skin is warm and dry  Neurological:      Mental Status: She is alert and oriented to person, place, and time           Medications:      Current Outpatient Medications:   •  acetaminophen (TYLENOL) 500 mg tablet, Take 2 tablets (1,000 mg total) by mouth every 6 (six) hours as needed for mild pain for up to 20 doses, Disp: 40 tablet, Rfl: 0  •  amLODIPine (NORVASC) 5 mg tablet, Take 1 tablet (5 mg total) by mouth daily Do not start before June 2, 2023 , Disp: 90 tablet, Rfl: 0  •  cetirizine (ZyrTEC) 10 mg tablet, Take 1 tablet (10 mg total) by mouth daily, Disp: 30 tablet, Rfl: 0  •  Diclofenac Sodium (VOLTAREN) 1 %, Apply 2 g topically every 6 (six) hours as needed (pain), Disp: 100 g, Rfl: 0  •  doxazosin (CARDURA) 2 mg tablet, take 1 tablet by mouth daily at bedtime, Disp: 30 tablet, Rfl: 5  •  fluticasone (FLONASE) 50 mcg/act nasal spray, 1 spray into each " "nostril daily for 14 days, Disp: 16 g, Rfl: 0  •  furosemide (LASIX) 40 mg tablet, Take 1 tablet (40 mg total) by mouth daily, Disp: 90 tablet, Rfl: 1  •  lidocaine (Lidoderm) 5 %, Apply 1 patch topically over 12 hours daily Remove & Discard patch within 12 hours or as directed by MD, Disp: 15 patch, Rfl: 0  •  metoprolol succinate (TOPROL-XL) 50 mg 24 hr tablet, Take 1 tablet (50 mg total) by mouth daily Do not start before May 13, 2023 , Disp: 30 tablet, Rfl: 0  •  nicotine (NICODERM CQ) 21 mg/24 hr TD 24 hr patch, Place 1 patch on the skin over 24 hours daily Do not start before May 13, 2023  (Patient not taking: Reported on 5/17/2023), Disp: 28 patch, Rfl: 0  •  nystatin powder, apply topically to affected area twice a day, Disp: , Rfl:   •  pantoprazole (PROTONIX) 40 mg tablet, take 1 tablet by mouth once daily, Disp: 30 tablet, Rfl: 2  •  rivaroxaban (XARELTO) 15 mg tablet, Take 1 tablet (15 mg total) by mouth every evening, Disp: 30 tablet, Rfl: 11     Family History   Problem Relation Age of Onset   • Arthritis Family    • Cancer Family    • Diabetes Family    • Hypertension Family    • Cancer Maternal Grandmother      Social History     Socioeconomic History   • Marital status: /Civil Union     Spouse name: Not on file   • Number of children: Not on file   • Years of education: 12   • Highest education level: Not on file   Occupational History   • Not on file   Tobacco Use   • Smoking status: Every Day     Packs/day: 0 50     Years: 35 00     Total pack years: 17 50     Types: Cigarettes   • Smokeless tobacco: Never   Vaping Use   • Vaping Use: Never used   Substance and Sexual Activity   • Alcohol use: Not Currently     Comment: occassionally   • Drug use: Yes     Types: Marijuana, Cocaine     Comment: Last used cocaine in 03/2023  Currently smoking \"1 drag of a joint\" at night to help her sleep (Updated 05/17/2023)     • Sexual activity: Yes     Partners: Male     Birth control/protection: None " Other Topics Concern   • Not on file   Social History Narrative    Disabled        · Do you currently or have you served in the [employer]:   No      · Were you activated, into active duty, as a member of the [employer] or as a Reservist:   No      · Diet:   Regular      · General stress level:   High      · Has smoked since age:   12      · Caffeine intake: Moderate      · Guns present in home:   No      · Seat belts used routinely:   Yes      · Sunscreen used routinely:   No      · Advance directive:   No      · Live alone or with others:   with others      · International travel:   no      · Pets:   No      · Blind or serious difficulty seeing: Yes     · Difficulty concentrating, remembering or making decisions:   No      · Difficulty walking or climbing stairs: Yes      · Difficulty dressing or bathing:   No      · Difficulty doing errands alone:   No      · What is the highest grade or level of school you have completed or the highest degree you have received:   12th grade, no diploma      · How many days of moderate to strenuous exercise, like a brisk walk, did you do in the last 7 days:   0      · How hard is it for you to pay for the very basics like food, housing, medical care, and heating:   Somewhat hard      · Do you feel stress - tense, restless, nervous, or anxious, or unable to sleep at night because your mind is troubled all the time - these days: Only a little          Social Determinants of Health     Financial Resource Strain: Not on file   Food Insecurity: Food Insecurity Present (5/31/2023)    Hunger Vital Sign    • Worried About Running Out of Food in the Last Year: Sometimes true    • Ran Out of Food in the Last Year: Sometimes true   Transportation Needs: No Transportation Needs (5/31/2023)    PRAPARE - Transportation    • Lack of Transportation (Medical): No    • Lack of Transportation (Non-Medical):  No   Physical Activity: Not on file   Stress: Not on file   Social Connections: Not on file   Intimate Partner Violence: Not on file   Housing Stability: Low Risk  (2023)    Housing Stability Vital Sign    • Unable to Pay for Housing in the Last Year: No    • Number of Places Lived in the Last Year: 1    • Unstable Housing in the Last Year: No     Social History     Tobacco Use   Smoking Status Every Day   • Packs/day: 0 50   • Years: 35 00   • Total pack years: 17 50   • Types: Cigarettes   Smokeless Tobacco Never     Social History     Substance and Sexual Activity   Alcohol Use Not Currently    Comment: occassionally       Labs & Results:  Below is the patient's most recent value for Albumin, ALT, AST, BUN, Calcium, Chloride, Cholesterol, CO2, Creatinine, GFR, Glucose, HDL, Hematocrit, Hemoglobin, Hemoglobin A1C, LDL, Magnesium, Phosphorus, Platelets, Potassium, PSA, Sodium, Triglycerides, and WBC  Lab Results   Component Value Date    ALT 22 2023    AST 24 2023    BUN 18 2023    CALCIUM 9 2 2023     2023    CO2 29 2023    CREATININE 1 79 (H) 2023    HDL 40 (L) 2023    HCT 36 2 2023    HGB 10 8 (L) 2023    HGBA1C 5 4 2023    MG 2 3 2023    PHOS 3 1 2023     2023    K 4 0 2023    TRIG 83 2023    WBC 6 61 2023     Note: for a comprehensive list of the patient's lab results, access the Results Review activity      CARDIAC TESTING:   ECHO:   Results for orders placed during the hospital encounter of 21    Echo complete with contrast if indicated    Narrative  [address] [place] [address]    Transthoracic Echocardiogram  2D, M-mode, Doppler, and Color Doppler    Study date:  25-May-2021    Patient: Hamilton Britt  MR number: [id number]  Account number: [de-identified]  : 1965  Age: 64 years  Gender: Female  Status: Inpatient  Location: [facility]  Height: 67 in  Weight: 212 lb  BP: 118/ 62 mmHg    Indications: Afib    Diagnoses: I48 0 - Atrial fibrillation    Sonographer:  PRISCILLA Stephenson  Referring Physician:  Lupe Araujo MD  Group:  [facility]  Interpreting Physician:  Wang Rodrigues MD    SUMMARY    LEFT VENTRICLE:  Systolic function was moderately to markedly reduced  Ejection fraction was estimated to be 30 %  There was moderate diffuse hypokinesis  There was severe concentric hypertrophy  There was significant hypertrophy of the LV apex  RIGHT VENTRICLE:  The size was normal   Systolic function was reduced  LEFT ATRIUM:  The atrium was dilated  HISTORY: PRIOR HISTORY: Cocaine abuse, Smoker, CKD III, Congestive heart failure  Hypertrophic cardiomyopathy  Atrial fibrillation  Risk factors: hypercholesterolemia  PRIOR PROCEDURES: ICD implantation  Arrhythmia ablation  PROCEDURE: The study was performed in the [facility]  This was a routine study  The transthoracic approach was used  The study included complete 2D imaging, M-mode, complete spectral Doppler, and color Doppler  The heart rate was 138  bpm, at the start of the study  Images were obtained from the parasternal, apical, subcostal, and suprasternal notch acoustic windows  Intravenous contrast (  6 mL Definity in NSS) was administered  Echocardiographic views were limited due  to poor acoustic window availability and lung interference  This was a technically difficult study  LEFT VENTRICLE: Size was normal  Systolic function was moderately to markedly reduced  Ejection fraction was estimated to be 30 %  There was moderate diffuse hypokinesis  Wall thickness was markedly increased  There was severe concentric  hypertrophy  There was significant hypertrophy of the LV apex  DOPPLER: Due to tachycardia, there was fusion of early and atrial contributions to ventricular filling  The study was not technically sufficient to allow evaluation of LV  diastolic function      RIGHT VENTRICLE: The size was normal  Systolic function was reduced  Wall thickness was normal  A pacing wire was present  LEFT ATRIUM: The atrium was dilated  RIGHT ATRIUM: Size was normal  A pacing wire was present  MITRAL VALVE: Valve structure was normal  There was normal leaflet separation  DOPPLER: The transmitral velocity was within the normal range  There was no evidence for stenosis  There was trace regurgitation  AORTIC VALVE: The valve was trileaflet  Leaflets exhibited normal thickness and normal cuspal separation  DOPPLER: Transaortic velocity was within the normal range  There was no evidence for stenosis  There was no significant  regurgitation  TRICUSPID VALVE: The valve structure was normal  There was normal leaflet separation  DOPPLER: The transtricuspid velocity was within the normal range  There was no evidence for stenosis  There was trace regurgitation  Pulmonary artery  systolic pressure was within the normal range  Estimated peak PA pressure was 21 mmHg  PULMONIC VALVE: Leaflets exhibited normal thickness, no calcification, and normal cuspal separation  DOPPLER: The transpulmonic velocity was within the normal range  There was trace regurgitation  PERICARDIUM: There was no pericardial effusion  The pericardium was normal in appearance  AORTA: The root exhibited normal size  SYSTEMIC VEINS: IVC: The inferior vena cava was normal in size   Respirophasic changes were normal     SYSTEM MEASUREMENT TABLES    2D  %FS: 27 49 %  Ao Diam: 2 77 cm  EDV(Teich): 57 94 ml  EF(Teich): 54 26 %  ESV(Teich): 26 5 ml  IVSd: 2 46 cm  LA Area: 26 06 cm2  LA Diam: 4 27 cm  LVIDd: 3 7 cm  LVIDs: 2 68 cm  LVPWd: 1 79 cm  RA Area: 18 49 cm2  RVIDd: 3 01 cm  RWT: 0 97  SV(Teich): 31 44 ml    CW  TR Vmax: 2 14 m/s  TR maxP 28 mmHg    MM  TAPSE: 1 51 cm    [employer] Accredited Echocardiography Laboratory    Prepared and electronically signed by    Rosa Isela Wellington MD  Signed [id number] 14:44:53    Results for orders placed during the hospital encounter of 20    HUBER    Narrative  [address]  [facility] [place], [address] [facility]  [phone]    Transesophageal Echocardiogram  2D, Doppler, and Color Doppler    Study date:  20-May-2020    Patient: Benji Garcia  MR number: [id number]  Account number: [de-identified]  : 1965  Age: 54 years  Gender: Female  Status: Outpatient  Location: Cath lab  Height: 67 in  Weight: 221 lb  BP:    Indications: AFIB    Diagnoses: I48 0 - Atrial fibrillation    Sonographer:  PRISCILLA Nava  Interpreting Physician:  Tomasa Alcala MD  Primary Physician:  Larry Dobbins MD  Referring Physician:  Tomasa Alcala MD  Group:  [facility]    SUMMARY    LEFT VENTRICLE:  Systolic function was normal  Ejection fraction was estimated to be 60 %  Wall thickness was moderately increased  There was moderate concentric hypertrophy  LEFT ATRIUM:  The atrium was mildly dilated  LEFT ATRIAL APPENDAGE:  The size was normal   The function was normal (normal emptying velocity)  No thrombus was identified  ATRIAL SEPTUM:  No defect or patent foramen ovale was identified  HISTORY: PRIOR HISTORY: AFIB, PPM, MI, HOCM, HTN, HLD, CKD III, Smoker, Cocaine abuse    PROCEDURE: The procedure was performed in the catheterization laboratory  This was a routine study  The risks and alternatives of the procedure were explained to the patient and informed consent was obtained  The transesophageal approach  was used  The study included complete 2D imaging, complete spectral Doppler, and color Doppler  An adult omniplane probe was inserted by the attending cardiologist  Intubated with ease  One intubation attempt(s)  There was no blood  detected on the probe  Image quality was adequate  MEDICATIONS: Anesthesia      LEFT VENTRICLE: Size was normal  Systolic function was normal  Ejection fraction was estimated to be 60 "%  Wall thickness was moderately increased  There was moderate concentric hypertrophy  RIGHT VENTRICLE: The size was normal  Systolic function was normal  Wall thickness was normal  A pacing wire was present  LEFT ATRIUM: The atrium was mildly dilated  No mass was present  There was no spontaneous echo contrast (\"smoke\")  APPENDAGE: The size was normal  No thrombus was identified  DOPPLER: The function was normal (normal emptying velocity)  ATRIAL SEPTUM: No defect or patent foramen ovale was identified  RIGHT ATRIUM: Size was normal  No thrombus was identified  MITRAL VALVE: Valve structure was normal  There was normal leaflet separation  There was no echocardiographic evidence of vegetation  DOPPLER: There was no regurgitation  AORTIC VALVE: The valve was trileaflet  Leaflets exhibited normal thickness and normal cuspal separation  There was no echocardiographic evidence of vegetation  DOPPLER: There was no regurgitation  TRICUSPID VALVE: The valve structure was normal  There was normal leaflet separation  There was no echocardiographic evidence of vegetation  DOPPLER: There was no regurgitation  PERICARDIUM: There was no pericardial effusion  The pericardium was normal in appearance  [address] Accredited Echocardiography Laboratory    Prepared and electronically signed by    Malick Martino MD  Signed [id number] 09:25:57      CATH:  No results found for this or any previous visit  STRESS TEST:  No results found for this or any previous visit      "

## 2023-06-21 ENCOUNTER — HOSPITAL ENCOUNTER (OUTPATIENT)
Dept: RADIOLOGY | Facility: HOSPITAL | Age: 58
Discharge: HOME | End: 2023-06-21
Attending: PODIATRIST
Payer: COMMERCIAL

## 2023-06-21 ENCOUNTER — OFFICE VISIT (OUTPATIENT)
Dept: PODIATRY | Facility: CLINIC | Age: 58
End: 2023-06-21
Payer: COMMERCIAL

## 2023-06-21 VITALS
BODY MASS INDEX: 37.83 KG/M2 | DIASTOLIC BLOOD PRESSURE: 109 MMHG | HEIGHT: 67 IN | OXYGEN SATURATION: 100 % | HEART RATE: 72 BPM | WEIGHT: 241 LBS | SYSTOLIC BLOOD PRESSURE: 171 MMHG

## 2023-06-21 DIAGNOSIS — L60.0 INGROWN TOENAIL: ICD-10-CM

## 2023-06-21 DIAGNOSIS — R52 PAIN: ICD-10-CM

## 2023-06-21 DIAGNOSIS — L60.3 ONYCHODYSTROPHY: ICD-10-CM

## 2023-06-21 DIAGNOSIS — R52 PAIN: Primary | ICD-10-CM

## 2023-06-21 PROCEDURE — 99203 OFFICE O/P NEW LOW 30 MIN: CPT | Performed by: PODIATRIST

## 2023-06-21 PROCEDURE — 73630 X-RAY EXAM OF FOOT: CPT
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
## 2023-06-28 ENCOUNTER — OFFICE VISIT (OUTPATIENT)
Dept: CARDIOLOGY CLINIC | Facility: CLINIC | Age: 58
End: 2023-06-28
Payer: COMMERCIAL

## 2023-06-28 VITALS
OXYGEN SATURATION: 98 % | DIASTOLIC BLOOD PRESSURE: 80 MMHG | SYSTOLIC BLOOD PRESSURE: 126 MMHG | HEIGHT: 67 IN | WEIGHT: 240.4 LBS | BODY MASS INDEX: 37.73 KG/M2 | HEART RATE: 70 BPM

## 2023-06-28 DIAGNOSIS — I48.3 TYPICAL ATRIAL FLUTTER (HCC): ICD-10-CM

## 2023-06-28 DIAGNOSIS — Z86.79 HISTORY OF VENTRICULAR TACHYCARDIA: ICD-10-CM

## 2023-06-28 DIAGNOSIS — R00.0 TACHYCARDIA INDUCED CARDIOMYOPATHY (HCC): Primary | ICD-10-CM

## 2023-06-28 DIAGNOSIS — I43 TACHYCARDIA INDUCED CARDIOMYOPATHY (HCC): Primary | ICD-10-CM

## 2023-06-28 PROCEDURE — 99214 OFFICE O/P EST MOD 30 MIN: CPT | Performed by: INTERNAL MEDICINE
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
## 2023-07-01 DIAGNOSIS — N18.30 BENIGN HYPERTENSION WITH CKD (CHRONIC KIDNEY DISEASE) STAGE III (HCC): ICD-10-CM

## 2023-07-01 DIAGNOSIS — I12.9 BENIGN HYPERTENSION WITH CKD (CHRONIC KIDNEY DISEASE) STAGE III (HCC): ICD-10-CM

## 2023-07-05 RX ORDER — DOXAZOSIN 2 MG/1
TABLET ORAL
Qty: 30 TABLET | Refills: 5 | Status: ON HOLD | OUTPATIENT
Start: 2023-07-05

## 2023-07-10 ENCOUNTER — HOSPITAL ENCOUNTER (EMERGENCY)
Facility: HOSPITAL | Age: 58
Discharge: HOME | End: 2023-07-10
Attending: EMERGENCY MEDICINE
Payer: COMMERCIAL

## 2023-07-10 ENCOUNTER — APPOINTMENT (EMERGENCY)
Dept: RADIOLOGY | Facility: HOSPITAL | Age: 58
End: 2023-07-10
Payer: COMMERCIAL

## 2023-07-10 VITALS
RESPIRATION RATE: 20 BRPM | BODY MASS INDEX: 37.75 KG/M2 | HEIGHT: 67 IN | HEART RATE: 118 BPM | OXYGEN SATURATION: 98 % | DIASTOLIC BLOOD PRESSURE: 83 MMHG | WEIGHT: 240.52 LBS | SYSTOLIC BLOOD PRESSURE: 157 MMHG | TEMPERATURE: 97.7 F

## 2023-07-10 DIAGNOSIS — S29.012A UPPER BACK STRAIN, INITIAL ENCOUNTER: ICD-10-CM
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
## 2023-07-10 DIAGNOSIS — S90.02XA CONTUSION OF LEFT ANKLE, INITIAL ENCOUNTER: ICD-10-CM

## 2023-07-10 DIAGNOSIS — M79.601 PAIN OF RIGHT UPPER EXTREMITY: ICD-10-CM

## 2023-07-10 PROCEDURE — 99284 EMERGENCY DEPT VISIT MOD MDM: CPT

## 2023-07-10 PROCEDURE — 73610 X-RAY EXAM OF ANKLE: CPT

## 2023-07-10 PROCEDURE — 73630 X-RAY EXAM OF FOOT: CPT

## 2023-07-10 PROCEDURE — 71101 X-RAY EXAM UNILAT RIBS/CHEST: CPT

## 2023-07-10 PROCEDURE — 73060 X-RAY EXAM OF HUMERUS: CPT

## 2023-07-10 RX ORDER — IBUPROFEN 400 MG/1
400 TABLET ORAL ONCE
Status: COMPLETED | OUTPATIENT
Start: 2023-07-10 | End: 2023-07-10

## 2023-07-10 RX ORDER — ACETAMINOPHEN 325 MG/1
650 TABLET ORAL ONCE
Status: COMPLETED | OUTPATIENT
Start: 2023-07-10 | End: 2023-07-10

## 2023-07-10 RX ADMIN — ACETAMINOPHEN 650 MG: 325 TABLET, FILM COATED ORAL at 18:28

## 2023-07-10 RX ADMIN — IBUPROFEN 400 MG: 400 TABLET ORAL at 18:32
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
## 2023-07-11 ENCOUNTER — HOSPITAL ENCOUNTER (EMERGENCY)
Facility: HOSPITAL | Age: 58
Discharge: HOME | End: 2023-07-11
Attending: EMERGENCY MEDICINE
Payer: COMMERCIAL

## 2023-07-11 ENCOUNTER — APPOINTMENT (EMERGENCY)
Dept: RADIOLOGY | Facility: HOSPITAL | Age: 58
End: 2023-07-11
Payer: COMMERCIAL

## 2023-07-11 VITALS
OXYGEN SATURATION: 97 % | HEART RATE: 70 BPM | RESPIRATION RATE: 17 BRPM | DIASTOLIC BLOOD PRESSURE: 100 MMHG | SYSTOLIC BLOOD PRESSURE: 157 MMHG | TEMPERATURE: 98.2 F

## 2023-07-11 DIAGNOSIS — T14.8XXA SPRAIN: Primary | ICD-10-CM

## 2023-07-11 PROCEDURE — 73090 X-RAY EXAM OF FOREARM: CPT

## 2023-07-11 PROCEDURE — 99284 EMERGENCY DEPT VISIT MOD MDM: CPT
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
## 2023-07-11 NOTE — DISCHARGE INSTRUCTIONS
Wear sling for comfort. [facility] Continue Tylenol 650 mg every 6 hours as needed for pain. You may apply your Voltaren cream as directed. Ice and elevate your right wrist and elbow as needed for comfort.

## 2023-07-16 ENCOUNTER — APPOINTMENT (EMERGENCY)
Dept: RADIOLOGY | Facility: HOSPITAL | Age: 58
DRG: 194 | End: 2023-07-16
Payer: COMMERCIAL

## 2023-07-16 ENCOUNTER — HOSPITAL ENCOUNTER (INPATIENT)
Facility: HOSPITAL | Age: 58
LOS: 1 days | Discharge: HOME | DRG: 194 | End: 2023-07-17
Attending: EMERGENCY MEDICINE | Admitting: INTERNAL MEDICINE
Payer: COMMERCIAL

## 2023-07-16 DIAGNOSIS — I50.9 CHF (CONGESTIVE HEART FAILURE) (HCC): ICD-10-CM

## 2023-07-16 DIAGNOSIS — R07.9 CHEST PAIN, UNSPECIFIED TYPE: Primary | ICD-10-CM

## 2023-07-16 PROBLEM — K92.1 BLOOD IN STOOL: Status: ACTIVE | Noted: 2023-07-16

## 2023-07-16 LAB
2HR DELTA HS TROPONIN: 7 NG/L
4HR DELTA HS TROPONIN: 6 NG/L
ANION GAP SERPL CALCULATED.3IONS-SCNC: 6 MMOL/L
ATRIAL RATE: 51 BPM
BASOPHILS # BLD AUTO: 0.01 THOUSANDS/ÂΜL (ref 0–0.1)
BASOPHILS NFR BLD AUTO: 0 % (ref 0–1)
BNP SERPL-MCNC: 179 PG/ML (ref 0–100)
BUN SERPL-MCNC: 20 MG/DL (ref 5–25)
CALCIUM SERPL-MCNC: 9.1 MG/DL (ref 8.4–10.2)
CARDIAC TROPONIN I PNL SERPL HS: 47 NG/L
CARDIAC TROPONIN I PNL SERPL HS: 53 NG/L
CARDIAC TROPONIN I PNL SERPL HS: 54 NG/L
CHLORIDE SERPL-SCNC: 107 MMOL/L (ref 96–108)
CO2 SERPL-SCNC: 27 MMOL/L (ref 21–32)
CREAT SERPL-MCNC: 2.21 MG/DL (ref 0.6–1.3)
EOSINOPHIL # BLD AUTO: 0.05 THOUSAND/ÂΜL (ref 0–0.61)
EOSINOPHIL NFR BLD AUTO: 1 % (ref 0–6)
ERYTHROCYTE [DISTWIDTH] IN BLOOD BY AUTOMATED COUNT: 15.4 % (ref 11.6–15.1)
GFR SERPL CREATININE-BSD FRML MDRD: 23 ML/MIN/1.73SQ M
GLUCOSE SERPL-MCNC: 88 MG/DL (ref 65–140)
HCT VFR BLD AUTO: 34.8 % (ref 34.8–46.1)
HGB BLD-MCNC: 10.5 G/DL (ref 11.5–15.4)
IMM GRANULOCYTES # BLD AUTO: 0.02 THOUSAND/UL (ref 0–0.2)
IMM GRANULOCYTES NFR BLD AUTO: 0 % (ref 0–2)
LYMPHOCYTES # BLD AUTO: 1.08 THOUSANDS/ÂΜL (ref 0.6–4.47)
LYMPHOCYTES NFR BLD AUTO: 19 % (ref 14–44)
MCH RBC QN AUTO: 25 PG (ref 26.8–34.3)
MCHC RBC AUTO-ENTMCNC: 30.2 G/DL (ref 31.4–37.4)
MCV RBC AUTO: 83 FL (ref 82–98)
MONOCYTES # BLD AUTO: 0.43 THOUSAND/ÂΜL (ref 0.17–1.22)
MONOCYTES NFR BLD AUTO: 8 % (ref 4–12)
NEUTROPHILS # BLD AUTO: 3.99 THOUSANDS/ÂΜL (ref 1.85–7.62)
NEUTS SEG NFR BLD AUTO: 72 % (ref 43–75)
NRBC BLD AUTO-RTO: 0 /100 WBCS
PLATELET # BLD AUTO: 144 THOUSANDS/UL (ref 149–390)
PMV BLD AUTO: 11.3 FL (ref 8.9–12.7)
POTASSIUM SERPL-SCNC: 3.9 MMOL/L (ref 3.5–5.3)
QRS AXIS: -78 DEGREES
QRSD INTERVAL: 196 MS
QT INTERVAL: 504 MS
QTC INTERVAL: 544 MS
RBC # BLD AUTO: 4.2 MILLION/UL (ref 3.81–5.12)
SODIUM SERPL-SCNC: 140 MMOL/L (ref 135–147)
T WAVE AXIS: 96 DEGREES
TSH SERPL DL<=0.05 MIU/L-ACNC: 1.68 UIU/ML (ref 0.45–4.5)
VENTRICULAR RATE: 70 BPM
WBC # BLD AUTO: 5.58 THOUSAND/UL (ref 4.31–10.16)

## 2023-07-16 PROCEDURE — 93010 ELECTROCARDIOGRAM REPORT: CPT | Performed by: INTERNAL MEDICINE

## 2023-07-16 PROCEDURE — 85025 COMPLETE CBC W/AUTO DIFF WBC: CPT | Performed by: EMERGENCY MEDICINE

## 2023-07-16 PROCEDURE — 99285 EMERGENCY DEPT VISIT HI MDM: CPT

## 2023-07-16 PROCEDURE — 99285 EMERGENCY DEPT VISIT HI MDM: CPT | Performed by: EMERGENCY MEDICINE

## 2023-07-16 PROCEDURE — 96374 THER/PROPH/DIAG INJ IV PUSH: CPT

## 2023-07-16 PROCEDURE — 71045 X-RAY EXAM CHEST 1 VIEW: CPT

## 2023-07-16 PROCEDURE — 84443 ASSAY THYROID STIM HORMONE: CPT

## 2023-07-16 PROCEDURE — 83880 ASSAY OF NATRIURETIC PEPTIDE: CPT | Performed by: EMERGENCY MEDICINE

## 2023-07-16 PROCEDURE — 80048 BASIC METABOLIC PNL TOTAL CA: CPT | Performed by: EMERGENCY MEDICINE

## 2023-07-16 PROCEDURE — 99223 1ST HOSP IP/OBS HIGH 75: CPT | Performed by: HOSPITALIST

## 2023-07-16 PROCEDURE — 93005 ELECTROCARDIOGRAM TRACING: CPT

## 2023-07-16 PROCEDURE — 36415 COLL VENOUS BLD VENIPUNCTURE: CPT | Performed by: EMERGENCY MEDICINE

## 2023-07-16 PROCEDURE — 84484 ASSAY OF TROPONIN QUANT: CPT | Performed by: EMERGENCY MEDICINE

## 2023-07-16 RX ORDER — NYSTATIN 100000 [USP'U]/G
POWDER TOPICAL 2 TIMES DAILY
Status: DISCONTINUED | OUTPATIENT
Start: 2023-07-16 | End: 2023-07-17 | Stop reason: HOSPADM

## 2023-07-16 RX ORDER — DOXAZOSIN MESYLATE 1 MG/1
2 TABLET ORAL
Status: DISCONTINUED | OUTPATIENT
Start: 2023-07-16 | End: 2023-07-17 | Stop reason: HOSPADM

## 2023-07-16 RX ORDER — SODIUM CHLORIDE 9 MG/ML
3 INJECTION INTRAVENOUS
Status: DISCONTINUED | OUTPATIENT
Start: 2023-07-16 | End: 2023-07-17 | Stop reason: HOSPADM

## 2023-07-16 RX ORDER — ACETAMINOPHEN 325 MG/1
650 TABLET ORAL EVERY 6 HOURS PRN
Status: DISCONTINUED | OUTPATIENT
Start: 2023-07-16 | End: 2023-07-16

## 2023-07-16 RX ORDER — METOPROLOL SUCCINATE 50 MG/1
50 TABLET, EXTENDED RELEASE ORAL DAILY
Status: DISCONTINUED | OUTPATIENT
Start: 2023-07-17 | End: 2023-07-17 | Stop reason: HOSPADM

## 2023-07-16 RX ORDER — FUROSEMIDE 10 MG/ML
40 INJECTION INTRAMUSCULAR; INTRAVENOUS ONCE
Status: COMPLETED | OUTPATIENT
Start: 2023-07-16 | End: 2023-07-16

## 2023-07-16 RX ORDER — AMLODIPINE BESYLATE 5 MG/1
5 TABLET ORAL DAILY
Status: DISCONTINUED | OUTPATIENT
Start: 2023-07-17 | End: 2023-07-17 | Stop reason: HOSPADM

## 2023-07-16 RX ORDER — ACETAMINOPHEN 325 MG/1
975 TABLET ORAL EVERY 6 HOURS PRN
Status: DISCONTINUED | OUTPATIENT
Start: 2023-07-16 | End: 2023-07-17 | Stop reason: HOSPADM

## 2023-07-16 RX ORDER — FUROSEMIDE 40 MG/1
40 TABLET ORAL DAILY
Status: DISCONTINUED | OUTPATIENT
Start: 2023-07-17 | End: 2023-07-17 | Stop reason: HOSPADM

## 2023-07-16 RX ORDER — PANTOPRAZOLE SODIUM 40 MG/1
40 TABLET, DELAYED RELEASE ORAL DAILY
Status: DISCONTINUED | OUTPATIENT
Start: 2023-07-17 | End: 2023-07-17 | Stop reason: HOSPADM

## 2023-07-16 RX ORDER — ASPIRIN 81 MG/1
324 TABLET, CHEWABLE ORAL ONCE
Status: COMPLETED | OUTPATIENT
Start: 2023-07-16 | End: 2023-07-16

## 2023-07-16 RX ADMIN — ACETAMINOPHEN 650 MG: 325 TABLET, FILM COATED ORAL at 19:16

## 2023-07-16 RX ADMIN — NITROGLYCERIN 1 INCH: 20 OINTMENT TOPICAL at 15:30

## 2023-07-16 RX ADMIN — FUROSEMIDE 40 MG: 10 INJECTION, SOLUTION INTRAMUSCULAR; INTRAVENOUS at 18:57

## 2023-07-16 RX ADMIN — ASPIRIN 81 MG 324 MG: 81 TABLET ORAL at 13:49

## 2023-07-16 RX ADMIN — DOXAZOSIN 2 MG: 1 TABLET ORAL at 21:29

## 2023-07-16 RX ADMIN — MORPHINE SULFATE 2 MG: 2 INJECTION, SOLUTION INTRAMUSCULAR; INTRAVENOUS at 13:46

## 2023-07-16 RX ADMIN — RIVAROXABAN 15 MG: 15 TABLET, FILM COATED ORAL at 18:57

## 2023-07-16 RX ADMIN — ACETAMINOPHEN 325 MG: 325 TABLET, FILM COATED ORAL at 20:01

## 2023-07-16 NOTE — ASSESSMENT & PLAN NOTE
· Reported on and off diarrhea with blood in the stool. · History of hemorrhoid. · Reported bright blood on the top of the stool and wipes. · Also endorsed blood mixed in the stool  · On Xarelto. Plan:  · Less likely active GI bleeding given stable hemoglobin. · Monitor.
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
## 2023-07-16 NOTE — ED PROVIDER NOTES
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                548               ms                  P Axis                                        degrees             QRS Axis                    -78               degrees             T Wave Axis                 98                degrees        -HS Troponin I 4hr:   Collection Time: 07/16/23  7:26 PM       Result                      Value             Ref Range           hs TnI 4hr                  53 (H)            "Refer to [facility]*       Delta 4hr hsTnI             6                 <20 ng/L         Due to patient's history and presentation the following imaging was collected:  X-ray chest 1 view portable   ED Interpretation    Cardiac pacemaker in place. Cardiomediastinal silhouette unremarkable. Patchy infiltration noted to the R lower lung fields. No pneumothorax. No results found. EKG: interpreted by myself as above.        Patient was administered:      Medication Administration - last 24 hours from 07/15/2023 2337 to   07/16/2023 2337       Date/Time Order Dose Route Action Action by     07/16/2023 1349 EDT aspirin chewable tablet 324 mg 324 mg Oral Given   Thania Oneill RN     07/16/2023 1346 EDT morphine injection 2 mg 2 mg Intravenous Given   Stacie Sellers RN     07/16/2023 1530 EDT nitroglycerin (NITRO-BID) 2 % TD ointment 1 inch 1   inch Topical Given Rai Cortes RN     07/16/2023 1916 EDT acetaminophen (TYLENOL) tablet 650 mg 650 mg Oral   [facility]     07/16/2023 2129 EDT doxazosin (CARDURA) tablet 2 mg 2 mg Oral Given   [facility] [place]     07/16/2023 1857 EDT rivaroxaban (XARELTO) tablet 15 mg 15 mg Oral Given   Vinayak Robles RN     07/16/2023 2014 EDT nystatin (MYCOSTATIN) powder -- Topical Not Given   [facility]     07/16/2023 1857 EDT furosemide (LASIX) injection 40 mg 40 mg Intravenous   Given Vinayak Robles RN     07/16/2023 2001 EDT acetaminophen (TYLENOL) tablet 975 mg 325 mg Oral   Given [facility]      See ED course for further MDM. Chest pain, unspecified type: acute illness or injury that poses a threat to life or bodily functions  CHF (congestive heart failure) ([address]): chronic illness or injury that poses a threat to life or bodily functions  Amount and/or Complexity of Data Reviewed  Independent Historian: spouse  External Data Reviewed: labs, radiology, ECG and notes. Labs: ordered. Decision-making details documented in ED Course. Radiology: ordered and independent interpretation performed. Decision-making details documented in ED Course. ECG/medicine tests: ordered and independent interpretation performed. Decision-making details documented in ED Course. Risk  OTC drugs. Prescription drug management. Decision regarding hospitalization.             Disposition  Final diagnoses:   Chest pain, unspecified type   CHF (congestive heart failure) ([address])     Time reflects when diagnosis was documented in both MDM as applicable and the Disposition within this note     Time User Action Codes Description Comment    7/16/2023  4:01 PM Abby Barreto Add [R07.9] Chest pain, unspecified type     7/16/2023  4:02 PM Aric Castellon Add [I50.9] CHF (congestive heart failure) ([address]) ED Disposition     ED Disposition   Admit    Condition   Stable    Date/Time   Sun Jul 16, 2023  4:01 PM    Comment   Case was discussed with [facility] hospitalist and the patient's admission status was agreed to be inpatient tele to the service of Dr. Loretta Keane. Follow-up Information    None         Current Discharge Medication List      CONTINUE these medications which have NOT CHANGED    Details   acetaminophen (TYLENOL) 500 mg tablet Take 2 tablets (1,000 mg total) by mouth every 6 (six) hours as needed for mild pain for up to 20 doses  Qty: 40 tablet, Refills: 0    Associated Diagnoses: Left leg pain      amLODIPine (NORVASC) 5 mg tablet Take 1 tablet (5 mg total) by mouth daily Do not start before June 2, 2023. Qty: 90 tablet, Refills: 0    Associated Diagnoses: Essential hypertension      Diclofenac Sodium (VOLTAREN) 1 % Apply 2 g topically every 6 (six) hours as needed (pain)  Qty: 100 g, Refills: 0    Associated Diagnoses: Left leg pain      doxazosin (CARDURA) 2 mg tablet take 1 tablet by mouth daily at bedtime  Qty: 30 tablet, Refills: 5    Associated Diagnoses: Benign hypertension with CKD (chronic kidney disease) stage III ([facility])      furosemide (LASIX) 40 mg tablet Take 1 tablet (40 mg total) by mouth daily  Qty: 90 tablet, Refills: 1    Associated Diagnoses: Heart failure with left ventricular ejection fraction greater than or equal to 50 percent ([facility])      metoprolol succinate (TOPROL-XL) 50 mg 24 hr tablet Take 1 tablet (50 mg total) by mouth daily Do not start before May 13, 2023.   Qty: 30 tablet, Refills: 0    Associated Diagnoses: Acute on chronic diastolic (congestive) heart failure ([facility])      nystatin powder apply topically to affected area twice a day      pantoprazole (PROTONIX) 40 mg tablet take 1 tablet by mouth once daily  Qty: 30 tablet, Refills: 2    Associated Diagnoses: Nausea and vomiting, unspecified vomiting type      rivaroxaban (XARELTO) 15 mg tablet Take 1 tablet (15 mg total) by mouth every evening  Qty: 30 tablet, Refills: 11    Associated Diagnoses: Electrolyte abnormality; Stage 3b chronic kidney disease (HCC)      cetirizine (ZyrTEC) 10 mg tablet Take 1 tablet (10 mg total) by mouth daily  Qty: 30 tablet, Refills: 0    Associated Diagnoses: Allergic rhinosinusitis      fluticasone (FLONASE) 50 mcg/act nasal spray 1 spray into each nostril daily for 14 days  Qty: 16 g, Refills: 0    Associated Diagnoses: Allergic rhinosinusitis      lidocaine (Lidoderm) 5 % Apply 1 patch topically over 12 hours daily Remove & Discard patch within 12 hours or as directed by MD  Qty: 15 patch, Refills: 0    Associated Diagnoses: Strain of left shoulder, initial encounter      nicotine (NICODERM CQ) 21 mg/24 hr TD 24 hr patch Place 1 patch on the skin over 24 hours daily Do not start before May 13, 2023. Qty: 28 patch, Refills: 0    Associated Diagnoses: Tobacco abuse           No discharge procedures on file. PDMP Review       Value Time User    PDMP Reviewed  Yes 5/8/2023  4:43 AM Nely Peña MD           ED Provider  Attending physically available and evaluated Maxwell Barakat. I managed the patient along with the ED Attending.     Electronically Signed by         Maral Kelsey DO  07/16/23 5375

## 2023-07-16 NOTE — ED ATTENDING ATTESTATION
7/16/2023  IBhavya DO, saw and evaluated the patient. I have discussed the patient with the resident/non-physician practitioner and agree with the resident's/non-physician practitioner's findings, Plan of Care, and MDM as documented in the resident's/non-physician practitioner's note, except where noted. All available labs and Radiology studies were reviewed. I was present for key portions of any procedure(s) performed by the resident/non-physician practitioner and I was immediately available to provide assistance. At this point I agree with the current assessment done in the Emergency Department. I have conducted an independent evaluation of this patient a history and physical is as follows:    ED Course   62year old female presents for evaluation of 2 day hx of bilateral lower extremity swelling and chest pain/pressure that started this am.  Patient feels a constant pressure in the center of her chest.  Also has cough with yellow sputum. Patient denies associated fever. No recent sick contacts or travel. Patient states that she has noticed increased lower extremity swelling despite taking Lasix 40 mg daily. Patient also reports having dyspnea on exertion. Patient states that she first noticed nonradiating chest pain while at rest this morning, initially the pain was sharp but then began to feel more like pressure. Patient sleeps in recliner due to not being able to lie flat because of shortness of breath, this is not an acute issue. Past medical history: Congestive heart failure, pacemaker, atrial fibrillation, hypertension, chronic kidney disease    Physical exam: Patient is awake and alert, no acute distress. Pupils are equal and reactive. Mucous membranes are moist.  Neck is supple. Heart is regular with occasional ectopy. Pulm with few scattered wheezes. Abdomen soft with mild tenderness in the left lower quadrant, no rebound, or guarding.   Patient with 5 out of 5 strength in all 4 extremities. Speech is clear and appropriate. 1+ pitting edema in bilateral lower extremities. No calf tenderness. Assessment: 51-year-old female with a complicated past medical history including CHF, A-fib, hypertension, chronic kidney disease presents with substernal chest pain. Differential diagnosis includes but not limited to acute myocardial ischemia, cardiac arrhythmia, pericarditis/myocarditis, gastroesophageal reflux, gastritis    Plan: We will check EKG, chest x-ray, CBC, CMP. Will provide pain medication and reassess. Patient does have a heart score of 7 she will likely require inpatient admission to the internal medicine service for further monitoring.         Wt Readings from Last 3 Encounters:   07/16/23 109 kg (240 lb 4.8 oz)   07/10/23 109 kg (240 lb 8.4 oz)   06/28/23 109 kg (240 lb 6.4 oz)         Critical Care Time  Procedures

## 2023-07-16 NOTE — ASSESSMENT & PLAN NOTE
Wt Readings from Last 3 Encounters:   07/16/23 109 kg (240 lb 4.8 oz)   07/10/23 109 kg (240 lb 8.4 oz)   06/28/23 109 kg (240 lb 6.4 oz)     History of EFrHF on daily Lasix 40 mg  · History of tachycardia induced cardiomyopathy  · Reported compliance with home medication  · Reported decreased urine output even on Lasix in the past few days  · Bilateral lower extremities 1+ edema  ·   · Troponin 47  · Fluctuating HTN, denied SOB  · Echo on 5/12/23: EF 65%      Plan:  · Continue home medications  · Additional IV Lasix given  · Monitor I&O, weights  · On telemetry  · Repeat echocardiogram if indicated

## 2023-07-16 NOTE — ASSESSMENT & PLAN NOTE
Lab Results   Component Value Date    EGFR 23 07/16/2023    EGFR 30 05/31/2023    EGFR 33 05/23/2023    CREATININE 2.21 (H) 07/16/2023    CREATININE 1.79 (H) 05/31/2023    CREATININE 1.68 (H) 05/23/2023     Baseline @ 2.  · On admission, creatinine level 2.21.  · Reported decreased urine output in the past few days even on Lasix. · Likely poor renal blood flow due to fluid overload secondary to CHF. · Unlikely from Lasix use. Plan:  · Continue oral Lasix. · Additional IV Lasix 40 mg.  · Trend BMP. · Encourage oral intake.

## 2023-07-16 NOTE — ASSESSMENT & PLAN NOTE
· Home meds amlodipine 5 mg daily, metoprolol 50 mg daily, Cardura 2 mg daily, Lasix 40 mg daily. · Reported compliance with home meds. · Poorly controlled BP. · On admission /98 trending down to 149/83. Plan:  · Close monitor.   · Additional IV Lasix 40 mg given  · Frequent vital checks  · Follow-up with cardiology as outpatient

## 2023-07-17 VITALS
HEART RATE: 70 BPM | WEIGHT: 239.4 LBS | BODY MASS INDEX: 37.57 KG/M2 | DIASTOLIC BLOOD PRESSURE: 90 MMHG | SYSTOLIC BLOOD PRESSURE: 137 MMHG | HEIGHT: 67 IN | RESPIRATION RATE: 18 BRPM | OXYGEN SATURATION: 99 % | TEMPERATURE: 97.9 F

## 2023-07-17 LAB
ANION GAP SERPL CALCULATED.3IONS-SCNC: 7 MMOL/L
ATRIAL RATE: 49 BPM
BUN SERPL-MCNC: 22 MG/DL (ref 5–25)
CALCIUM SERPL-MCNC: 8.7 MG/DL (ref 8.4–10.2)
CHLORIDE SERPL-SCNC: 108 MMOL/L (ref 96–108)
CO2 SERPL-SCNC: 24 MMOL/L (ref 21–32)
CREAT SERPL-MCNC: 2.11 MG/DL (ref 0.6–1.3)
ERYTHROCYTE [DISTWIDTH] IN BLOOD BY AUTOMATED COUNT: 15.5 % (ref 11.6–15.1)
GFR SERPL CREATININE-BSD FRML MDRD: 25 ML/MIN/1.73SQ M
GLUCOSE SERPL-MCNC: 106 MG/DL (ref 65–140)
HCT VFR BLD AUTO: 33.4 % (ref 34.8–46.1)
HGB BLD-MCNC: 10.2 G/DL (ref 11.5–15.4)
MCH RBC QN AUTO: 25.6 PG (ref 26.8–34.3)
MCHC RBC AUTO-ENTMCNC: 30.5 G/DL (ref 31.4–37.4)
MCV RBC AUTO: 84 FL (ref 82–98)
PLATELET # BLD AUTO: 115 THOUSANDS/UL (ref 149–390)
PMV BLD AUTO: 11.5 FL (ref 8.9–12.7)
POTASSIUM SERPL-SCNC: 3.7 MMOL/L (ref 3.5–5.3)
QRS AXIS: -78 DEGREES
QRSD INTERVAL: 194 MS
QT INTERVAL: 508 MS
QTC INTERVAL: 548 MS
RBC # BLD AUTO: 3.98 MILLION/UL (ref 3.81–5.12)
SODIUM SERPL-SCNC: 139 MMOL/L (ref 135–147)
T WAVE AXIS: 98 DEGREES
VENTRICULAR RATE: 70 BPM
WBC # BLD AUTO: 3.79 THOUSAND/UL (ref 4.31–10.16)

## 2023-07-17 PROCEDURE — 85027 COMPLETE CBC AUTOMATED: CPT | Performed by: INTERNAL MEDICINE

## 2023-07-17 PROCEDURE — 93010 ELECTROCARDIOGRAM REPORT: CPT | Performed by: INTERNAL MEDICINE

## 2023-07-17 PROCEDURE — 80048 BASIC METABOLIC PNL TOTAL CA: CPT | Performed by: INTERNAL MEDICINE

## 2023-07-17 PROCEDURE — 99239 HOSP IP/OBS DSCHRG MGMT >30: CPT | Performed by: INTERNAL MEDICINE

## 2023-07-17 RX ORDER — FUROSEMIDE 40 MG/1
40 TABLET ORAL DAILY PRN
Qty: 15 TABLET | Refills: 0 | Status: SHIPPED | OUTPATIENT
Start: 2023-07-17 | End: 2023-07-25

## 2023-07-17 RX ADMIN — AMLODIPINE BESYLATE 5 MG: 5 TABLET ORAL at 08:33

## 2023-07-17 RX ADMIN — PANTOPRAZOLE SODIUM 40 MG: 40 TABLET, DELAYED RELEASE ORAL at 08:33

## 2023-07-17 RX ADMIN — FUROSEMIDE 40 MG: 40 TABLET ORAL at 08:33

## 2023-07-17 RX ADMIN — METOPROLOL SUCCINATE 50 MG: 50 TABLET, EXTENDED RELEASE ORAL at 08:33

## 2023-07-17 RX ADMIN — NYSTATIN: 100000 POWDER TOPICAL at 08:34

## 2023-07-17 NOTE — DISCHARGE SUMMARY
[address] [facility]  Discharge- Roslyn Merrill 1965, 62 y.o. female MRN: [id number]  Unit/Bed#: S -Keisha Encounter: [id number]  Primary Care Provider: Hermila Vaz MD   Date and time admitted to hospital: 7/16/2023  1:02 PM    Blood in stool  Assessment & Plan  · Reported on and off diarrhea with blood in the stool. · History of hemorrhoid and diverticulitis. · Reported bright blood on the top of the stool and wipes. · Also endorsed blood mixed in the stool  · On Xarelto. Plan:  · Less likely active GI bleeding given stable hemoglobin. · Monitor outpatient. Essential hypertension  Assessment & Plan  · Home meds amlodipine 5 mg daily, metoprolol 50 mg daily, Cardura 2 mg daily, Lasix 40 mg daily. · Reported compliance with home meds. · Poorly controlled BP. · On admission /98 trending down to 137/90 on 7/17 stable    Plan:  · Close monitor. · Continue home oral Lasix 40 mg + PRN oral Lasix  · Additional IV Lasix 40 mg given  · Frequent vital checks  · Follow-up with cardiology as outpatient    Chronic kidney disease stage 3   Assessment & Plan  Lab Results   Component Value Date    EGFR 23 07/16/2023    EGFR 30 05/31/2023    EGFR 33 05/23/2023    CREATININE 2.21 (H) 07/16/2023    CREATININE 1.79 (H) 05/31/2023    CREATININE 1.68 (H) 05/23/2023     · Baseline @ 2.  · On admission, creatinine level 2.21. Downtrending 2.11 on 7/17. · Reported decreased urine output in the past few days even on Lasix. · Likely poor renal blood flow due to fluid overload secondary to CHF. · Unlikely from Lasix use. Plan:  · Continue oral Lasix. Add PRN oral Lasix. · Additional IV Lasix 40 mg given. · Trend BMP. · Encourage oral intake. Paroxysmal A-fib [facility])  Assessment & Plan  S/p AVJ ablation with ventricle pacemaker. Paced rhythm @70. Xarelto 15 mg daily. Tobacco abuse  Assessment & Plan  Smoking cessation counseled given.   Reported not tolerate with nicotine patch.    * Chronic heart failure with preserved ejection fraction (HFpEF) ([facility])  Assessment & Plan  Wt Readings from Last 3 Encounters:   07/16/23 109 kg (240 lb 4.8 oz)   07/10/23 109 kg (240 lb 8.4 oz)   06/28/23 109 kg (240 lb 6.4 oz)     · History of EFrHF transitioning to EFpHF on daily Lasix 40 mg  · History of tachycardia induced cardiomyopathy  · Reported compliance with home medication  · Reported decreased urine output even on Lasix in the past few days  · Bilateral lower extremities 1+ edema  ·   · Troponin 47 flattened curve  · Fluctuating HTN, denied SOB  · Echo on 5/12/23: EF 65  · EKG normal  · Bilateral LE swelling has decreased after 1 day. No complaints of chest pain      Plan:  · Continue home medications  · Add PRN oral Lasix for chronic CHF  · Additional IV Lasix given  · Monitor I&O, weights  · On telemetry  · Repeat echocardiogram if indicated    Medical Problems     Resolved Problems  Date Reviewed: 7/17/2023   None       Discharging Resident: Kristen Wood MD  Discharging Attending: No att. providers found  PCP: Latoya Mckeon MD  Admission Date:   Admission Orders (From admission, onward)     Ordered        07/16/23 [address] [place] [address]  Once                      Discharge Date: 07/17/23    Consultations During Hospital Stay:  · n/a    Procedures Performed:   · n/a    Significant Findings / Test Results:   X-ray chest 1 view portable    Result Date: 7/17/2023  Impression: No acute cardiopulmonary disease. Workstation performed: [id number]       X-ray chest 1 view portable    Result Date: 7/17/2023  Impression No acute cardiopulmonary disease. Workstation performed: [id number]     Incidental Findings:   · n/a  · I reviewed the above mentioned incidental findings with the patient and/or family and they expressed understanding.     Test Results Pending at Discharge (will require follow up):  · n/a     Outpatient Tests Requested:  · n/a    Complications:  n/a    Reason for Admission: Chest pain/pressure, hypertensive, bilateral LE swelling    Hospital Course:   Gumaro Grewal is a 62 y.o. female patient who originally presented to the hospital on 7/16/2023 due to 1 day of chest pain and bilateral leg swelling. On evaluation, patient was noted to be hypertensive and dyspneic on exertion in the setting of chronic CHF w/ preserved ejection fraction. Cardiac workup CXR, troponins, EKG was negative for afib, ACS. Patient was given 40 mg IV Lasix. On evaluation, patient was without symptoms of chest pain or LE swelling. Able to get out of bed and ambulate. Vitals stable and feeling improved. Patient was given PRN Lasix in addition to home medications and cleared for discharge home. The patient, initially admitted to the hospital as inpatient, was discharged earlier than expected given the following: Stable, asymptomatic. Please see above list of diagnoses and related plan for additional information. Condition at Discharge: good    Discharge Day Visit / Exam:   Subjective:  Patient was seen and examined. No acute overnight events. Mild headache similar to her chronic migraines. Patient denied any fever, chills, chest pain, shortness of breath, nausea, vomiting. No changes in bowel or bladder. Eating and drinking. Vitals: Blood Pressure: 137/90 (07/17/23 0700)  Pulse: 70 (07/17/23 0700)  Temperature: 97.9 °F (36.6 °C) (07/17/23 0700)  Temp Source: Oral (07/17/23 0700)  Respirations: 18 (07/17/23 0700)  Height: 5' 7" (170.2 cm) (07/16/23 1303)  Weight - Scale: 109 kg (239 lb 6.4 oz) (07/17/23 0600)  SpO2: 99 % (07/17/23 0700)  Exam:   Physical Exam  Vitals reviewed. Constitutional:       General: She is not in acute distress. Appearance: Normal appearance. She is not ill-appearing, toxic-appearing or diaphoretic. HENT:      Head: Normocephalic and atraumatic. Nose: No congestion or rhinorrhea.       Mouth/Throat:      Mouth: Mucous membranes are moist.      Pharynx: Oropharynx is clear.   Eyes:      Pupils: Pupils are equal, round, and reactive to light. Cardiovascular:      Rate and Rhythm: Normal rate and regular rhythm. Pulses: Normal pulses. Heart sounds: Normal heart sounds. No murmur heard. No friction rub. No gallop. Pulmonary:      Effort: Pulmonary effort is normal. No respiratory distress. Breath sounds: Normal breath sounds. No wheezing, rhonchi or rales. Abdominal:      General: Abdomen is flat. Bowel sounds are normal. There is no distension. Palpations: Abdomen is soft. Tenderness: There is abdominal tenderness. There is no guarding. Comments: Mild tenderness in LLQ from chronic diverticulitis   Musculoskeletal:         General: No swelling. Normal range of motion. Cervical back: Normal range of motion and neck supple. No rigidity or tenderness. Skin:     General: Skin is warm and dry. Coloration: Skin is not pale. Findings: No erythema, lesion or rash. Neurological:      Mental Status: She is alert and oriented to person, place, and time. Cranial Nerves: No cranial nerve deficit. Sensory: No sensory deficit. Motor: No weakness. Psychiatric:         Mood and Affect: Mood normal.         Behavior: Behavior normal.         Thought Content: Thought content normal.         Discussion with Family: Updated  () at bedside. Discharge instructions/Information to patient and family:   See after visit summary for information provided to patient and family. Provisions for Follow-Up Care:  See after visit summary for information related to follow-up care and any pertinent home health orders. Disposition:   Home    Planned Readmission: n/a    Discharge Medications:  See after visit summary for reconciled discharge medications provided to patient and/or family.       **Please Note: This note may have been constructed using a voice recognition system**

## 2023-07-17 NOTE — PLAN OF CARE
Problem: PAIN - ADULT  Goal: Verbalizes/displays adequate comfort level or baseline comfort level  Description: Interventions:  - Encourage patient to monitor pain and request assistance  - Assess pain using appropriate pain scale  - Administer analgesics based on type and severity of pain and evaluate response  - Implement non-pharmacological measures as appropriate and evaluate response  - Consider cultural and social influences on pain and pain management  - Notify physician/advanced practitioner if interventions unsuccessful or patient reports new pain  Outcome: Progressing     Problem: INFECTION - ADULT  Goal: Absence or prevention of progression during hospitalization  Description: INTERVENTIONS:  - Assess and monitor for signs and symptoms of infection  - Monitor lab/diagnostic results  - Monitor all insertion sites, i.e. indwelling lines, tubes, and drains  - Monitor endotracheal if appropriate and nasal secretions for changes in amount and color  - [place] appropriate cooling/warming therapies per order  - Administer medications as ordered  - Instruct and encourage patient and family to use good hand hygiene technique  - Identify and instruct in appropriate isolation precautions for identified infection/condition  Outcome: Progressing  Goal: Absence of fever/infection during neutropenic period  Description: INTERVENTIONS:  - Monitor WBC    Outcome: Progressing     Problem: SAFETY ADULT  Goal: Patient will remain free of falls  Description: INTERVENTIONS:  - Educate patient/family on patient safety including physical limitations  - Instruct patient to call for assistance with activity   - Consult OT/PT to assist with strengthening/mobility   - Keep Call bell within reach  - Keep bed low and locked with side rails adjusted as appropriate  - Keep care items and personal belongings within reach  - Initiate and maintain comfort rounds  - Make Fall Risk Sign visible to staff  - Offer Toileting every 2 Hours, in advance of need  - Initiate/Maintain bed alarm  - Obtain necessary fall risk management equipment: bed alarm  - Apply yellow socks and bracelet for high fall risk patients  - Consider moving patient to room near nurses station  Outcome: Progressing  Goal: Maintain or return to baseline ADL function  Description: INTERVENTIONS:  -  Assess patient's ability to carry out ADLs; assess patient's baseline for ADL function and identify physical deficits which impact ability to perform ADLs (bathing, care of mouth/teeth, toileting, grooming, dressing, etc.)  - Assess/evaluate cause of self-care deficits   - Assess range of motion  - Assess patient's mobility; develop plan if impaired  - Assess patient's need for assistive devices and provide as appropriate  - Encourage maximum independence but intervene and supervise when necessary  - Involve family in performance of ADLs  - Assess for home care needs following discharge   - Consider OT consult to assist with ADL evaluation and planning for discharge  - Provide patient education as appropriate  Outcome: Progressing  Goal: Maintains/Returns to pre admission functional level  Description: INTERVENTIONS:  - Perform BMAT or MOVE assessment daily.   - Set and communicate daily mobility goal to care team and patient/family/caregiver. - Collaborate with rehabilitation services on mobility goals if consulted  - Perform Range of Motion 2 times a day. - Reposition patient every 2 hours.   - Dangle patient 2 times a day  - Stand patient 2 times a day  - Ambulate patient 2 times a day  - Out of bed to chair 2 times a day   - Out of bed for meals 2 times a day  - Out of bed for toileting  - Record patient progress and toleration of activity level   Outcome: Progressing

## 2023-07-17 NOTE — UTILIZATION REVIEW
Initial Clinical Review    Admission: Date/Time/Statement:   Admission Orders (From admission, onward)     Ordered        07/16/23 1604  INPATIENT ADMISSION  Once                      Orders Placed This Encounter   Procedures   • INPATIENT ADMISSION     Standing Status:   Standing     Number of Occurrences:   1     Order Specific Question:   Level of Care     Answer:   Med Surg [16]     Order Specific Question:   Estimated length of stay     Answer:   More than 2 Midnights     Order Specific Question:   Certification     Answer:   I certify that inpatient services are medically necessary for this patient for a duration of greater than two midnights. See H&P and MD Progress Notes for additional information about the patient's course of treatment. ED Arrival Information     Expected   -    Arrival   7/16/2023 12:55    Acuity   Urgent            Means of arrival   Walk-In    Escorted by   Spouse    Service   Hospitalist    Admission type   Emergency            Arrival complaint   chest pain/swelling feet hx heart problems           Chief Complaint   Patient presents with   • Chest Pain     Pt reports swelling in both of her feet and legs starting yesterday. Pt reports chest pain that started today. Pt reports difficulty breathing with ambulation. Initial Presentation: 62 y.o. female  With a pmh of Afib- flutter,  S/p ablation in 2022, tachycardia mediated cardiomyopathy now recovered, CKD stage 4, SURINDER. She presented to the ED  With c/o chest pain, pressure worse with inspiration. She noticed b/l pedal edema  For the past 2 days. She is  admitted inpatient for chest pressure and concern for acute on chronic CHF exacerbation. Date: 7/17/2023     Day 2:  No acute events overnight. CHF- Given lasix  40 mg x 1   Hypertensive urgency - poorly controlled, resume home meds, & lasix 40 mg  and prn, hydralazine prn  CKD stage 4 Cr 2.2 today, monitor.    She reports blood in stool, Hx of hemorrhoid & diverticulitis. , on Xarelto, Hg stable , monitor OP. ED Triage Vitals [07/16/23 1303]   Temperature Pulse Respirations Blood Pressure SpO2   98.1 °F (36.7 °C) 70 20 (!) 172/98 97 %      Temp Source Heart Rate Source Patient Position - Orthostatic VS BP Location FiO2 (%)   Oral Monitor Sitting Right arm --      Pain Score       10 - Worst Possible Pain          Wt Readings from Last 1 Encounters:   07/17/23 109 kg (239 lb 6.4 oz)     Additional Vital Signs:   Date/Time Temp Pulse Resp BP MAP (mmHg) SpO2 O2 Device Patient Position - Orthostatic VS   07/17/23 0700 97.9 °F (36.6 °C) 70 18 137/90 -- 99 % None (Room air) Lying   07/16/23 2129 -- -- -- 140/80 -- -- -- --   07/16/23 18:38:19 97.6 °F (36.4 °C) 70 18 149/83 105 97 % -- --         Pertinent Labs/Diagnostic Test Results:   X-ray chest 1 view portable   ED Interpretation by Saira Francis DO (07/16 1528)   Cardiac pacemaker in place. Cardiomediastinal silhouette unremarkable. Patchy infiltration noted to the R lower lung fields. No pneumothorax. Final Result by Juliet Edward MD (07/17 7199)      No acute cardiopulmonary disease.                   Workstation performed: [id number]            ECG - 7/16 - Electronic ventricular pacemaker  When compared with ECG of 01-JUN-2023 01:20,  No significant change was found      Component Ref Range & Units 7/16/23 1624 7/16/23 1307   Ventricular Rate BPM 70  70    Atrial Rate BPM 49  51    AK Interval ms      QRSD Interval ms 194  196    QT Interval ms 508  504    QTC Interval ms 548  544    P Axis degrees      QRS Axis degrees -78  -78    T Wave Axis degrees 98  96        ECG -7/16 - Previous ECG:  Compared to current     Similarity:  No change   Interpretation:     Interpretation: abnormal     Rate:     ECG rate assessment: tachycardic     Rhythm:     Rhythm: paced     Pacing:     Capture:  Complete     Type of pacing:  Ventricular   Ectopy:     Ectopy: none     QRS:     QRS intervals:  Wide   T waves:   T waves: non-specific      ECG 7/16 - 5:31 pm    Previous ECG:  Compared to current     Similarity:  No change   Interpretation:     Interpretation: normal     Rate:     ECG rate:  70     ECG rate assessment: normal     Rhythm:     Rhythm: paced     Pacing:     Type of pacing:  Ventricular   Ectopy:     Ectopy: none     QRS:     QRS intervals:  Wide   T waves:     T waves: non-specific            Results from last 7 days   Lab Units 07/17/23  0427 07/16/23  1347   WBC Thousand/uL 3.79* 5.58   HEMOGLOBIN g/dL 10.2* 10.5*   HEMATOCRIT % 33.4* 34.8   PLATELETS Thousands/uL 115* 144*   NEUTROS ABS Thousands/µL  --  3.99         Results from last 7 days   Lab Units 07/17/23  0426 07/16/23  1347   SODIUM mmol/L 139 140   POTASSIUM mmol/L 3.7 3.9   CHLORIDE mmol/L 108 107   CO2 mmol/L 24 27   ANION GAP mmol/L 7 6   BUN mg/dL 22 20   CREATININE mg/dL 2.11* 2.21*   EGFR ml/min/1.73sq m 25 23   CALCIUM mg/dL 8.7 9.1             Results from last 7 days   Lab Units 07/17/23  0426 07/16/23  1347   GLUCOSE RANDOM mg/dL 106 88       Results from last 7 days   Lab Units 07/16/23  1926 07/16/23  1615 07/16/23  1347   HS TNI 0HR ng/L  --   --  47   HS TNI 2HR ng/L  --  54*  --    HSTNI D2 ng/L  --  7  --    HS TNI 4HR ng/L 53*  --   --    HSTNI D4 ng/L 6  --   --        Results from last 7 days   Lab Units 07/16/23  1347   TSH 3RD GENERATON uIU/mL 1.676       Results from last 7 days   Lab Units 07/16/23  1347   BNP pg/mL 179*       ED Treatment:   Medication Administration from 07/16/2023 1254 to 07/16/2023 1829       Date/Time Order Dose Route Action Comments     07/16/2023 1349 EDT aspirin chewable tablet 324 mg 324 mg Oral Given --     07/16/2023 1346 EDT morphine injection 2 mg 2 mg Intravenous Given --     07/16/2023 1530 EDT nitroglycerin (NITRO-BID) 2 % TD ointment 1 inch 1 inch Topical Given --        Past Medical History:   Diagnosis Date   • ACS (acute coronary syndrome) ([address]) 02/07/2021   • Acute on chronic diastolic CHF 01/23/2019   • Arthritis    • Atrial fibrillation [facility])    • Atrial fibrillation with rapid ventricular response ([address]) 03/20/2016   • Breast lump    • CKD (chronic kidney disease) stage 3, GFR 30-59 ml/min ([facility])    • Disease of thyroid gland    • Elevated troponin 09/09/2019   • Epigastric abdominal tenderness 08/15/2021   • 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 ([address]) 05/25/2020   • GERD (gastroesophageal reflux disease)    • H/O transfusion 1987   • Hepatitis C     resolved   • History of tachycardia induced cardiomyopathy 05/2021    in setting of rapid atrial flutter in 05/2021 and again 06/2022   • History of ventricular tachycardia 07/2016    s/p ICD   • Hyperlipidemia    • Hypertension    • Inappropriate ICD discharge for atrial flutter 04/2023   • NSTEMI (non-ST elevated myocardial infarction) ([address]) 12/26/2018   • Sleep apnea     no cpap     Present on Admission:  • Paroxysmal A-fib ([address])  • Essential hypertension  • Chronic heart failure with preserved ejection fraction (HFpEF) ([facility])  • Tobacco abuse  • Chronic kidney disease stage 3       Admitting Diagnosis: CHF (congestive heart failure) ([address]) [I50.9]  Chest pain [R07.9]  Chest pain, unspecified type [R07.9]  Age/Sex: 62 y.o. female       Admission Orders:  Scheduled Medications:  amLODIPine, 5 mg, Oral, Daily  doxazosin, 2 mg, Oral, HS  furosemide, 40 mg, Oral, Daily  metoprolol succinate, 50 mg, Oral, Daily  nystatin, , Topical, BID  pantoprazole, 40 mg, Oral, Daily  rivaroxaban, 15 mg, Oral, QPM  Lasix 40 mg iv once - 7/16 x 1         Continuous IV Infusions:     PRN Meds:  acetaminophen, 975 mg, Oral, Q6H PRN-7/16 x 1   Tylenol 650 mg po prn - 7/16 x 1   Diclofenac Sodium, 2 g, Topical, Q6H PRN  sodium chloride (PF), 3 mL, Intravenous, Q1H PRN            Network Utilization Review Department  ATTENTION: Please call with any questions or concerns to [phone] and carefully listen to the prompts so that you are directed to the right person. All voicemails are confidential.  Michelle Guadarrama all requests for admission clinical reviews, approved or denied determinations and any other requests to dedicated fax number below belonging to the campus where the patient is receiving treatment.  List of dedicated fax numbers for the Facilities:  [place] DENAUGUSTO (Administrative/Medical Necessity) [phone] [address] [facility] (Maternity/NICU/Pediatrics) [phone] 190 [facility] [phone]   [facility] 1000 [facility] [phone]   [facility]9 [address] [place] [address] [phone]   [address] [address] [phone]

## 2023-07-17 NOTE — ASSESSMENT & PLAN NOTE
Wt Readings from Last 3 Encounters:   07/16/23 109 kg (240 lb 4.8 oz)   07/10/23 109 kg (240 lb 8.4 oz)   06/28/23 109 kg (240 lb 6.4 oz)     · History of EFrHF transitioning to EFpHF on daily Lasix 40 mg  · History of tachycardia induced cardiomyopathy  · Reported compliance with home medication  · Reported decreased urine output even on Lasix in the past few days  · Bilateral lower extremities 1+ edema  ·   · Troponin 47 flattened curve  · Fluctuating HTN, denied SOB  · Echo on 5/12/23: EF 65  · EKG normal  · Bilateral LE swelling has decreased after 1 day.  No complaints of chest pain      Plan:  · Continue home medications  · Add PRN oral Lasix for chronic CHF  · Additional IV Lasix given  · Monitor I&O, weights  · On telemetry  · Repeat echocardiogram if indicated

## 2023-07-17 NOTE — DISCHARGE INSTR - AVS FIRST PAGE
Dear Raheem Tipton,     It was our pleasure to care for you here at [facility]. It is our hope that we were always able to exceed the expected standards for your care during your stay. You were hospitalized due to chest pain. You were cared for on the 3rd floor by Dixie Mackay MD under the service of Lee Ann Banerjee MD with the [facility] who covers for your primary care physician (PCP), Joseph Miller MD, while you were hospitalized. If you have any questions or concerns related to this hospitalization, you may contact us at [phone]. For follow up as well as any medication refills, we recommend that you follow up with your primary care physician. A registered nurse will reach out to you by phone within a few days after your discharge to answer any additional questions that you may have after going home. However, at this time we provide for you here, the most important instructions / recommendations at discharge:     Notable Medication Adjustments -   Please weigh yourself daily and take an additional dose of 40 mg Lasix if greater than 3 pounds of weight gain in 1 day or greater than 5 pounds in 1 week. Testing Required after Discharge -   none  Important follow up information -   Please follow with your PCP within 1 week of discharge. Please keep your appointment with your cardiologist in August.   Other Instructions -   None  Please review this entire after visit summary as additional general instructions including medication list, appointments, activity, diet, any pertinent wound care, and other additional recommendations from your care team that may be provided for you.       Sincerely,     Dixie Mackay MD

## 2023-07-17 NOTE — UTILIZATION REVIEW
NOTIFICATION OF INPATIENT ADMISSION   AUTHORIZATION REQUEST   SERVICING FACILITY:   [address] [place] [address]  [address] [facility]), [address] [place] [address]  Tax ID: [id number]  NPI: [id number]   ATTENDING PROVIDER:  Attending Name and NPI#: Hanna Spain, [address] [facility] [address] [[id number]]  Address: [address] [place] [address], [address] [place] [address]  Phone: [phone]     ADMISSION INFORMATION:  Place of Service: Inpatient [address] [place] [address] Code: 21  Inpatient Admission Date/Time: 7/16/23  4:04 PM  Discharge Date/Time: 7/17/2023  1:56 PM  Admitting Diagnosis Code/Description:  CHF (congestive heart failure) ([address]) [I50.9]  Chest pain [R07.9]  Chest pain, unspecified type [R07.9]     UTILIZATION REVIEW CONTACT:  Dolly Hansen Utilization   Network Utilization Review Department  Phone: [phone]  Fax: [phone]  Email: [email or web address]  Contact for approvals/pending authorizations, clinical reviews, and discharge. PHYSICIAN ADVISORY SERVICES:  Medical Necessity Denial & Mxem-rc-Vxga Review  Phone: [phone]  Fax: [phone]  Email: [email or web address]

## 2023-07-17 NOTE — PLAN OF CARE
Problem: PAIN - ADULT  Goal: Verbalizes/displays adequate comfort level or baseline comfort level  Description: Interventions:  - Encourage patient to monitor pain and request assistance  - Assess pain using appropriate pain scale  - Administer analgesics based on type and severity of pain and evaluate response  - Implement non-pharmacological measures as appropriate and evaluate response  - Consider cultural and social influences on pain and pain management  - Notify physician/advanced practitioner if interventions unsuccessful or patient reports new pain  Outcome: Progressing     Problem: DISCHARGE PLANNING  Goal: Discharge to home or other facility with appropriate resources  Description: INTERVENTIONS:  - Identify barriers to discharge w/patient and caregiver  - Arrange for needed discharge resources and transportation as appropriate  - Identify discharge learning needs (meds, wound care, etc.)  - Arrange for interpretive services to assist at discharge as needed  - Refer to Case Management Department for coordinating discharge planning if the patient needs post-hospital services based on physician/advanced practitioner order or complex needs related to functional status, cognitive ability, or social support system  Outcome: Progressing     Problem: Knowledge Deficit  Goal: Patient/family/caregiver demonstrates understanding of disease process, treatment plan, medications, and discharge instructions  Description: Complete learning assessment and assess knowledge base.   Interventions:  - Provide teaching at level of understanding  - Provide teaching via preferred learning methods  Outcome: Progressing     Problem: CARDIOVASCULAR - ADULT  Goal: Maintains optimal cardiac output and hemodynamic stability  Description: INTERVENTIONS:  - Monitor I/O, vital signs and rhythm  - Monitor for S/S and trends of decreased cardiac output  - Administer and titrate ordered vasoactive medications to optimize hemodynamic stability  - Assess quality of pulses, skin color and temperature  - Assess for signs of decreased coronary artery perfusion  - Instruct patient to report change in severity of symptoms  Outcome: Progressing  Goal: Absence of cardiac dysrhythmias or at baseline rhythm  Description: INTERVENTIONS:  - Continuous cardiac monitoring, vital signs, obtain 12 lead EKG if ordered  - Administer antiarrhythmic and heart rate control medications as ordered  - Monitor electrolytes and administer replacement therapy as ordered  Outcome: Progressing     Problem: METABOLIC, FLUID AND ELECTROLYTES - ADULT  Goal: Fluid balance maintained  Description: INTERVENTIONS:  - Monitor labs   - Monitor I/O and WT  - Instruct patient on fluid and nutrition as appropriate  - Assess for signs & symptoms of volume excess or deficit  Outcome: Progressing

## 2023-07-17 NOTE — ASSESSMENT & PLAN NOTE
· Home meds amlodipine 5 mg daily, metoprolol 50 mg daily, Cardura 2 mg daily, Lasix 40 mg daily. · Reported compliance with home meds. · Poorly controlled BP. · On admission /98 trending down to 137/90 on 7/17 stable    Plan:  · Close monitor.   · Continue home oral Lasix 40 mg + PRN oral Lasix  · Additional IV Lasix 40 mg given  · Frequent vital checks  · Follow-up with cardiology as outpatient

## 2023-07-17 NOTE — ASSESSMENT & PLAN NOTE
· Reported on and off diarrhea with blood in the stool. · History of hemorrhoid and diverticulitis. · Reported bright blood on the top of the stool and wipes. · Also endorsed blood mixed in the stool  · On Xarelto. Plan:  · Less likely active GI bleeding given stable hemoglobin. · Monitor outpatient.

## 2023-07-18 NOTE — UTILIZATION REVIEW
NOTIFICATION OF ADMISSION DISCHARGE   This is a Notification of Discharge from [facility]. Please be advised that this patient has been discharge from our facility. Below you will find the admission and discharge date and time including the patient’s disposition. UTILIZATION REVIEW CONTACT:  Sigifredo Barrios MA  Utilization   Network Utilization Review Department  Phone: [phone] x carefully listen to the prompts. All voicemails are confidential.  Email: [email or web address]     ADMISSION INFORMATION  PRESENTATION DATE: 7/16/2023  1:02 PM  OBERVATION ADMISSION DATE:   INPATIENT ADMISSION DATE: 7/16/23  4:04 PM   DISCHARGE DATE: 7/17/2023  1:56 PM   DISPOSITION:Home/Self Care    IMPORTANT INFORMATION:  Send all requests for admission clinical reviews, approved or denied determinations and any other requests to dedicated fax number below belonging to the campus where the patient is receiving treatment.  List of dedicated fax numbers:  [place] DENIALS (Administrative/Medical Necessity) [phone] 230[address] [facility] (Maternity/NICU/Pediatrics) [phone]   [facility] [phone]   [facility] [phone] 1636 [facility] [phone]   [address] [place] [address] [phone]   [facility] [phone]   [address] [place] [address] [address] [facility] [phone]   [address] [place] [address] [phone] 3[address] [facility] [phone]   [address] [facility] [address] [facility] [phone]

## 2023-07-20 ENCOUNTER — PATIENT OUTREACH (OUTPATIENT)
Dept: CASE MANAGEMENT | Facility: HOSPITAL | Age: 58
End: 2023-07-20

## 2023-07-20 DIAGNOSIS — I50.33 ACUTE ON CHRONIC DIASTOLIC HEART FAILURE (HCC): Primary | ICD-10-CM

## 2023-07-20 NOTE — PROGRESS NOTES
Heart Failure Outpatient Care Coordinator Note: Patient identified on hospital discharge HRR report, chart reviewed and  referral made for HF outpatient care management. Call made to patient and explained role of HF RN OPCM. Patient receptive and agreeable to outreach and education. Self-management/education and teach back:  Primary learner: Self  Following low sodium diet: States avoids salty foods- will explore more at next contact- patient currently at work  Following fluid restriction: Unsure of restriction- recommended no more than 64 oz- 8 cups/ 4 16 oz water bottles  Hospital discharge weight: 239.4#  Weighing daily:   Yes and  recordinst home weight: 233- 234#    Weight today: 236#  Monitoring symptoms: Yes  Any current symptoms: States "the water is coming back"- Increased edema of feet. Knows when to call provider: Yes and reinforced  Medication reviewed and taking all as prescribed: Did not a med review. Sees cardiology tomorrow. Knows name of diuretic: Yes  Escalation plan:Took additional 40 mg furosemide today as has prn order. Care Coordination:  Aware of cardiology follow up appointment: Yes tomorrow  Aware of PCP follow up appointment:  Additional comments: States plans to talk with provider at cardiology appointment about need for increased or different diuretic. Will follow up and assess need for further education around diet/ FR.

## 2023-07-21 ENCOUNTER — OFFICE VISIT (OUTPATIENT)
Dept: CARDIOLOGY CLINIC | Facility: CLINIC | Age: 58
End: 2023-07-21
Payer: COMMERCIAL

## 2023-07-21 VITALS
BODY MASS INDEX: 38.14 KG/M2 | SYSTOLIC BLOOD PRESSURE: 110 MMHG | WEIGHT: 243 LBS | HEART RATE: 70 BPM | HEIGHT: 67 IN | OXYGEN SATURATION: 100 % | DIASTOLIC BLOOD PRESSURE: 80 MMHG

## 2023-07-21 DIAGNOSIS — I43 TACHYCARDIA INDUCED CARDIOMYOPATHY (HCC): ICD-10-CM

## 2023-07-21 DIAGNOSIS — Z09 HOSPITAL DISCHARGE FOLLOW-UP: Primary | ICD-10-CM

## 2023-07-21 DIAGNOSIS — E87.6 DIURETIC-INDUCED HYPOKALEMIA: ICD-10-CM

## 2023-07-21 DIAGNOSIS — I48.91 ATRIAL FIBRILLATION, UNSPECIFIED TYPE (HCC): ICD-10-CM

## 2023-07-21 DIAGNOSIS — R00.0 TACHYCARDIA INDUCED CARDIOMYOPATHY (HCC): ICD-10-CM

## 2023-07-21 DIAGNOSIS — T50.2X5A DIURETIC-INDUCED HYPOKALEMIA: ICD-10-CM

## 2023-07-21 DIAGNOSIS — N18.4 STAGE 4 CHRONIC KIDNEY DISEASE (HCC): ICD-10-CM

## 2023-07-21 DIAGNOSIS — I50.33 ACUTE ON CHRONIC HEART FAILURE WITH PRESERVED EJECTION FRACTION (HFPEF) (HCC): ICD-10-CM

## 2023-07-21 PROCEDURE — 99214 OFFICE O/P EST MOD 30 MIN: CPT | Performed by: PHYSICIAN ASSISTANT

## 2023-07-21 RX ORDER — POTASSIUM CHLORIDE 20MEQ/15ML
20 LIQUID (ML) ORAL 2 TIMES DAILY
Qty: 473 ML | Refills: 1 | Status: SHIPPED | OUTPATIENT
Start: 2023-07-21

## 2023-07-21 RX ORDER — FUROSEMIDE 10 MG/ML
80 INJECTION INTRAMUSCULAR; INTRAVENOUS ONCE
Status: DISCONTINUED | OUTPATIENT
Start: 2023-07-21 | End: 2023-07-21

## 2023-07-21 RX ORDER — CYCLOBENZAPRINE HCL 5 MG
TABLET ORAL
COMMUNITY

## 2023-07-21 NOTE — PROGRESS NOTES
General Cardiology Outpatient Progress Note    Farida Mantilla 62 y.o. female   MRN: [id number]  Encounter: [id number]    Assessment:  Patient Active Problem List    Diagnosis Date Noted   • Abnormal finding on CT scan 05/08/2023   • Morbid obesity 01/14/2023   • Anemia 01/14/2023   • Left low back pain 11/05/2022   • Chronic heart failure with preserved ejection fraction (HFpEF) ([address]) 10/13/2022   • Renal cyst 08/12/2022   • Diverticulitis of large intestine without perforation or abscess 06/23/2022   • Leucopenia 06/18/2022   • Nephrolithiasis 06/09/2022   • Essential hypertension 04/08/2022   • Chronic kidney disease stage 3  01/31/2022   • Hypertensive urgency 01/30/2022   • Chronic hepatitis C without hepatic coma ([address]) 01/30/2022   • Acute right flank pain 08/15/2021   • Paroxysmal A-fib ([address]) 05/14/2020   • Cocaine abuse ([address]) 05/14/2020   • Elevated lipase 10/10/2019   • Elevated troponin 09/09/2019   • Headache 12/26/2018   • Tobacco abuse 12/26/2018   • History of monomorphic ventricular tachycardia, s/p ICD in 2016 07/13/2016   • Gastroesophageal reflux disease  03/20/2016   • Blood in stool 07/16/2023   • History of tachycardia induced cardiomyopathy 05/25/2021   • Atypical atrial flutter ([address]) 03/20/2016       Today's Plan:  • Up 4-5 lbs since discharge despite appropriate use of PRN Lasix. Estimated dry weight of ~225 lbs. Orders placed for in office IV Lasix (80 mg x1). o Unfortunately, RN unable to get IV access in office today.   o So, will have patient increase PO Lasix to 80 mg BID through the weekend. • Office RN to contact patient on 07/24 for clinical update +/- further diuretic dosing adjustments. May warrant switching to alternative oral diuretic in near future. • Start potassium 20 mEq BID. • Repeat BMP before next visit. • RBBB with wide QRS on EKG in office today s/p AVN ablation.  Will need to closely monitor pacing percentage/LVEF as patient currently has dual chamber ICD.    Plan:  Chronic HFrEF, now recovered; LVEF 65%; LVIDd 3.6 cm; NYHA III; ACC/AHA Stage C   Etiology: nonischemic; tachy-mediated in 05/2021 (LVEF 30%) and again in 06/2022 (LVEF 40-45%) in setting of rapid Aflutter. [facility] 01/07/2019: no obstructive CAD. TTE 04/18/2019: LVEF 65%. TTE 09/18/2020: LVEF 65%. TTE 05/25/2021: LVEF 30%. LVIDd 3.7 cm. Reduced RVSF. Trace MR and TR. TTE 01/31/2022: LVEF 55%. LVIDd 4.8 cm. Grade 2 DD. Mildly reduced RVSF. TTE (limited) 06/19/2022: LVEF 40-45%. LVIDd 4.1 cm.    TTE (limited) 07/28/2022: LVEF 55%. Normal RV. TTE 04/05/2023: LVEF 65%. LVIDd 3.6 cm. Normal RV. Mild TR.    TTE (limited) 05/12/2023: LVEF 65%. Normal RV. Weight of 239 lbs on 07/17 (day of discharge). Today, weighs 243 lbs. Most recent BMP from 07/17/2023: sodium 139; potassium 3.7; BUN 22; creatinine 2.11; eGFR 25. Pharmacotherapies / Neurohormonal Blockade:  --Beta Blocker: metoprolol succinate 50 mg daily. --ARNi / ACEi / ARB: No, CKD precludes. --Aldosterone Antagonist: No, CKD precludes. --SGLT2 Inhibitor: No, CKD precludes. --Diuretic: Lasix 40 mg daily - increase to 80 mg BID and start potassium solution 15 mL (20 mEq) BID. Sudden Cardiac Death Risk Reduction:  --[employer] dual chamber ICD in situ since 07/2016. --Interrogation from  06/19/2023: AP 0%.  98%. Lead parameters WNL. AF burden 41.1%. OptiVol WNL. Normal device function. Electrical Resynchronization:  --Candidacy for BiV device: RBBB with  ms. Atrial flutter / atrial fibrillation    [id number] = 3 (sex, HF, HTN). Anticoagulation on Xarelto. S/p unspecified ablation at [facility] in 2015. S/p Afib ablation with PVI in 05/2020. S/p atypical flutter and micro-reentrant atrial tachycardia ablation in 06/2022. S/p AV node ablation with ICD reprogramming on 05/10/2023. Rate control: as above. Rhythm control: None.     Hypertension   BP of 110/80 mmHg in office today.   Continues on amlodipine 5 mg daily, doxazosin 2 mg qHS, and medications as above. Chronic kidney disease, stage IV   Baseline creatinine of 1.7-2.1. Most recent BMP from 07/17/2023: sodium 139; potassium 3.7; BUN 22; creatinine 2.11; eGFR 25. Follows with Dr. David Arriaza as outpatient. History of monomorphic ventricular tachycardia: noted on outpatient loop recorder; s/p secondary prevention ICD in 07/2016. Obstructive sleep apnea  Tobacco abuse: currently smoking 0.5 ppd. History of cocaine use: last used in 03/2023. Marijuana use: smoking "1 drag of a joint" at night to help her sleep. HPI:   Joaquim Joe is a 60-year-old woman with a PMH as above who presents to the office for hospital follow-up. Follows with Dr. Eunice Dejesus. Presented to [facility] on 05/07/2023 with frequent palpitations. Found to be in rapid atrial flutter in ED, started on amiodarone drip, and cardiology consulted. IV Lasix was started, and amiodarone switched to PO diltiazem (due to prior concern that amiodarone may be pro-arrhythmic for patient). Transferred to [facility] on 05/09 for EP evaluation. Seen by EP and underwent AVN ablation with ICD reprogramming on 05/10/2023. Transitioned to PO Lasix on 05/12. Lost 6 lbs this admission. 05/17/2023: Patient presents with her  for hospital follow-up. Up 7 lbs since discharge. Reports her normal weight prior to her hospitalization was around 322 lbs (confirmed in Epic). With this weight gain, she reports new ABDUL after walking about 1/2 block as well as hand swelling (symptom she has had before when overloaded). Has chronic mild orthopnea, and denies PND. Reports at time of admission was only able to walk a few steps before feeling winded. One month ago, was walking and active with no limitations. Has only been taking 20 mg Lasix daily (rather than 40 mg) - was using tablets from a prior prescription.  Unable to tolerate potassium pills - has been vomiting them up soon after taking. Has good appetite with occasional early satiety. Recent meals included chicken pot pie (at restaurant on Mother's Day) and salads with ranch or [place] dressing. Does not add salt to foods; uses Mrs. Matos only. 05/31/2023 with TH: "Presents for 2 week follow up. Was only taking half of her Lasix dose at last visit and weight was up significantly. Today she reports taking 40 mg of Lasix since last visit and weight is back down to baseline. However patient is markedly hypertensive in clinic today with systolic over 508. Tells me the last 4-5 days she has been checking at home and getting readings as high as 851, systolic. Has developed headaches and now experiencing orthopnea with PND over past week. She has been adherent to medical therapy. Still smoking a few cigarettes per day and occasional marijuana." Sent to hospital for admission for hypertensive urgency (was admitted for 28 hours). Admitted as inpatient (not observation) to [facility] from 07/16 to 07/17/2023 after presenting with chest pain, ABDUL, and LE swelling. BP in ED of 172/98 mmHg. Cardiac work-up unremarkable. . Received one dose IV Lasix and was discharged home.     07/21/2023: Patient presents for hospital follow-up. Up 4-5 lbs since discharge with worsening SOB and LE swelling. Has chronic mild orthopnea but reports worsening in severity and frequency as well as new PND. Denies missing any medications doses. In fact took an extra 40 mg Lasix on 07/18 and again on 07/20 due to weight gain on home scale with minimal improvement in symptoms and no resultant weight loss. Denies any recent dietary indiscretion.      Past Medical History:   Diagnosis Date   • ACS (acute coronary syndrome) ([address]) 02/07/2021   • Acute on chronic diastolic CHF 52/08/0232   • Arthritis    • Atrial fibrillation [facility])    • Atrial fibrillation with rapid ventricular response ([address]) 03/20/2016   • Breast lump    • CKD (chronic kidney disease) stage 3, GFR 30-59 ml/min ([facility])    • Disease of thyroid gland    • Elevated troponin 09/09/2019   • Epigastric abdominal tenderness 08/15/2021   • 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 ([address]) 05/25/2020   • GERD (gastroesophageal reflux disease)    • H/O transfusion 1987   • Hepatitis C     resolved   • History of tachycardia induced cardiomyopathy 05/2021    in setting of rapid atrial flutter in 05/2021 and again 06/2022   • History of ventricular tachycardia 07/2016    s/p ICD   • Hyperlipidemia    • Hypertension    • Inappropriate ICD discharge for atrial flutter 04/2023   • NSTEMI (non-ST elevated myocardial infarction) ([address]) 12/26/2018   • Sleep apnea     no cpap       Review of Systems   Constitutional: Positive for fatigue and unexpected weight change. Negative for activity change and appetite change. Eyes: Negative. Respiratory: Positive for shortness of breath. Negative for cough and chest tightness. Cardiovascular: Positive for leg swelling. Negative for chest pain and palpitations. Gastrointestinal: Positive for abdominal distention. Negative for abdominal pain, diarrhea, nausea and vomiting. Endocrine: Negative. Genitourinary: Positive for decreased urine volume. Negative for difficulty urinating, dysuria and urgency. Musculoskeletal: Negative. Skin: Negative. Allergic/Immunologic: Negative. Neurological: Negative for dizziness, syncope, weakness and light-headedness. Psychiatric/Behavioral: Positive for sleep disturbance. Negative for confusion.        Allergies   Allergen Reactions   • Coconut Oil - Food Allergy Hives   • Iodinated Contrast Media Hives   • Tape  [Medical Tape] Hives         Current Outpatient Medications:   •  amLODIPine (NORVASC) 5 mg tablet, Take 1 tablet (5 mg total) by mouth daily Do not start before June 2, 2023., Disp: 90 tablet, Rfl: 0  •  cyclobenzaprine (FLEXERIL) 5 mg tablet, , Disp: , Rfl:   •  Diclofenac Sodium (VOLTAREN) 1 %, Apply 2 g topically every 6 (six) hours as needed (pain), Disp: 100 g, Rfl: 0  •  doxazosin (CARDURA) 2 mg tablet, take 1 tablet by mouth daily at bedtime, Disp: 30 tablet, Rfl: 5  •  furosemide (LASIX) 40 mg tablet, Take 1 tablet (40 mg total) by mouth daily, Disp: 90 tablet, Rfl: 1  •  metoprolol succinate (TOPROL-XL) 50 mg 24 hr tablet, Take 1 tablet (50 mg total) by mouth daily Do not start before May 13, 2023., Disp: 30 tablet, Rfl: 0  •  pantoprazole (PROTONIX) 40 mg tablet, take 1 tablet by mouth once daily, Disp: 30 tablet, Rfl: 2  •  potassium chloride 10% oral solution, Take 15 mL (20 mEq total) by mouth 2 (two) times a day, Disp: 473 mL, Rfl: 1  •  rivaroxaban (XARELTO) 15 mg tablet, Take 1 tablet (15 mg total) by mouth every evening, Disp: 30 tablet, Rfl: 11  •  furosemide (LASIX) 40 mg tablet, Take 1 tablet (40 mg total) by mouth daily as needed (For increase in weight >3 lb in 1 day or >5 lb in 1 week.), Disp: 15 tablet, Rfl: 0  •  nystatin powder, apply topically to affected area twice a day, Disp: , Rfl:   No current facility-administered medications for this visit. Social History     Socioeconomic History   • Marital status: /Civil Union     Spouse name: Not on file   • Number of children: Not on file   • Years of education: 15   • Highest education level: Not on file   Occupational History   • Not on file   Tobacco Use   • Smoking status: Every Day     Packs/day: 0.50     Years: 35.00     Total pack years: 17.50     Types: Cigarettes   • Smokeless tobacco: Never   Vaping Use   • Vaping Use: Never used   Substance and Sexual Activity   • Alcohol use: Not Currently     Comment: occassionally   • Drug use: Yes     Types: Marijuana, Cocaine     Comment: Last used cocaine in 03/2023. Currently smoking "1 drag of a joint" at night to help her sleep (Updated 05/17/2023).    • Sexual activity: Yes     Partners: Male     Birth control/protection: None   Other Topics Concern • Not on file   Social History Narrative    Disabled        · Do you currently or have you served in the [address] [place] [address]:   No      · Were you activated, into active duty, as a member of the [employer] or as a Reservist:   No      · Diet:   Regular      · General stress level:   High      · Has smoked since age:   12      · Caffeine intake: Moderate      · Guns present in home:   No      · Seat belts used routinely:   Yes      · Sunscreen used routinely:   No      · Advance directive:   No      · Live alone or with others:   with others      · International travel:   no      · Pets:   No      · Blind or serious difficulty seeing: Yes     · Difficulty concentrating, remembering or making decisions:   No      · Difficulty walking or climbing stairs: Yes      · Difficulty dressing or bathing:   No      · Difficulty doing errands alone:   No      · What is the highest grade or level of school you have completed or the highest degree you have received:   12th grade, no diploma      · How many days of moderate to strenuous exercise, like a brisk walk, did you do in the last 7 days:   0      · How hard is it for you to pay for the very basics like food, housing, medical care, and heating:   Somewhat hard      · Do you feel stress - tense, restless, nervous, or anxious, or unable to sleep at night because your mind is troubled all the time - these days: Only a little          Social Determinants of Health     Financial Resource Strain: Not on file   Food Insecurity: Food Insecurity Present (5/31/2023)    Hunger Vital Sign    • Worried About Running Out of Food in the Last Year: Sometimes true    • Ran Out of Food in the Last Year: Sometimes true   Transportation Needs: No Transportation Needs (5/31/2023)    PRAPARE - Transportation    • Lack of Transportation (Medical): No    • Lack of Transportation (Non-Medical):  No   Physical Activity: Not on file   Stress: Not on file   Social Connections: Not on file Intimate Partner Violence: Not on file   Housing Stability: Low Risk  (5/31/2023)    Housing Stability Vital Sign    • Unable to Pay for Housing in the Last Year: No    • Number of Places Lived in the Last Year: 1    • Unstable Housing in the Last Year: No     Family History   Problem Relation Age of Onset   • Arthritis Family    • Cancer Family    • Diabetes Family    • Hypertension Family    • Cancer Maternal Grandmother        Vitals:   Blood pressure 110/80, pulse 70, height 5' 7" (1.702 m), weight 110 kg (243 lb), SpO2 100 %, not currently breastfeeding. Body mass index is 38.06 kg/m². Wt Readings from Last 10 Encounters:   07/21/23 110 kg (243 lb)   07/17/23 109 kg (239 lb 6.4 oz)   07/10/23 109 kg (240 lb 8.4 oz)   06/28/23 109 kg (240 lb 6.4 oz)   06/21/23 109 kg (241 lb)   06/09/23 106 kg (233 lb)   06/01/23 107 kg (235 lb 0.2 oz)   05/31/23 107 kg (236 lb 6.4 oz)   05/17/23 110 kg (242 lb 8 oz)   05/12/23 107 kg (235 lb 14.3 oz)     Vitals:    07/21/23 1458   BP: 110/80   BP Location: Left arm   Patient Position: Sitting   Cuff Size: Large   Pulse: 70   SpO2: 100%   Weight: 110 kg (243 lb)   Height: 5' 7" (1.702 m)       Physical Exam  Vitals reviewed. Constitutional:       General: She is awake. She is not in acute distress. Appearance: Normal appearance. She is well-developed and overweight. She is not toxic-appearing or diaphoretic. HENT:      Head: Normocephalic. Nose: Nose normal.      Mouth/Throat:      Mouth: Mucous membranes are moist.   Eyes:      General: No scleral icterus. Conjunctiva/sclera: Conjunctivae normal.   Neck:      Vascular: JVD present. Trachea: No tracheal deviation. Cardiovascular:      Rate and Rhythm: Normal rate. Rhythm irregularly irregular. No extrasystoles are present. Heart sounds: No murmur heard. Pulmonary:      Effort: Pulmonary effort is normal. No tachypnea, bradypnea or respiratory distress.       Breath sounds: Decreased air movement present. No wheezing or rhonchi. Abdominal:      General: There is distension. Palpations: Abdomen is soft. Tenderness: There is no abdominal tenderness. Musculoskeletal:      Cervical back: Neck supple. Right lower le+ Pitting Edema present. Left lower le+ Pitting Edema present. Skin:     General: Skin is warm and dry. Coloration: Skin is not jaundiced or pale. Neurological:      General: No focal deficit present. Mental Status: She is alert and oriented to person, place, and time. Psychiatric:         Attention and Perception: Attention normal.         Mood and Affect: Mood and affect normal.         Speech: Speech normal.         Behavior: Behavior normal. Behavior is cooperative. Thought Content:  Thought content normal.       Labs & Results:  Lab Results   Component Value Date    WBC 3.79 (L) 2023    HGB 10.2 (L) 2023    HCT 33.4 (L) 2023    MCV 84 2023     (L) 2023     Lab Results   Component Value Date    SODIUM 139 2023    K 3.7 2023     2023    CO2 24 2023    BUN 22 2023    CREATININE 2.11 (H) 2023    GLUC 106 2023    CALCIUM 8.7 2023     Lab Results   Component Value Date    INR 1.22 (H) 2023    INR 1.22 (H) 2023    INR 1.10 2023    PROTIME 15.6 (H) 2023    PROTIME 15.7 (H) 2023    PROTIME 14.4 2023     Lab Results   Component Value Date    NTBNP 9,053 (H) 2023      Lab Results   Component Value Date     (H) 2023      Isidor Phalen, PA-C

## 2023-07-21 NOTE — PATIENT INSTRUCTIONS
Increase Lasix to 80 mg (2 tablets) twice a day over the weekend. We may change the dose on Monday depending on how you respond.  potassium solution from your pharmacy. Begin taking 15 mL potassium twice a day. Our nurse will call you on Monday to see how you're doing  Have blood work completed on Monday/Tuesday. We will see in again in the office next week. Please weigh yourself every day (after emptying your bladder) and keep a detailed log of weights. Contact the Heart Failure program at [phone] if you gain 3+ lbs overnight or 5+ lbs in 5-7 days. Limit daily sodium/salt intake to 2000 mg daily to prevent fluid retention. Avoid canned foods, fast food/Chinese food, and processed meats (hot dogs, lunch meat, and sausage etc.). Caution with condiments. Limit fluid intake to 2000 mL or 2 liters (about 60-65 ounces) daily. Avoid electrolyte replacement drinks (such as Gatorade, Pedialyte, Propel, Liquid IV, etc.). Bring complete list of medications and log of daily weights to your follow-up appointment.

## 2023-07-22 ENCOUNTER — HOSPITAL ENCOUNTER (INPATIENT)
Facility: HOSPITAL | Age: 58
LOS: 3 days | Discharge: HOME | DRG: 194 | End: 2023-07-25
Attending: EMERGENCY MEDICINE | Admitting: INTERNAL MEDICINE
Payer: COMMERCIAL

## 2023-07-22 ENCOUNTER — APPOINTMENT (EMERGENCY)
Dept: RADIOLOGY | Facility: HOSPITAL | Age: 58
DRG: 194 | End: 2023-07-22
Payer: COMMERCIAL

## 2023-07-22 DIAGNOSIS — I50.9 CHF EXACERBATION (HCC): Primary | ICD-10-CM

## 2023-07-22 DIAGNOSIS — R60.0 BILATERAL LOWER EXTREMITY EDEMA: ICD-10-CM

## 2023-07-22 DIAGNOSIS — L03.90 CELLULITIS: ICD-10-CM

## 2023-07-22 LAB
2HR DELTA HS TROPONIN: 1 NG/L
4HR DELTA HS TROPONIN: 10 NG/L
ALBUMIN SERPL BCP-MCNC: 4.1 G/DL (ref 3.5–5)
ALP SERPL-CCNC: 66 U/L (ref 34–104)
ALT SERPL W P-5'-P-CCNC: 18 U/L (ref 7–52)
ANION GAP SERPL CALCULATED.3IONS-SCNC: 7 MMOL/L
AST SERPL W P-5'-P-CCNC: 25 U/L (ref 13–39)
ATRIAL RATE: 64 BPM
BASOPHILS # BLD AUTO: 0.03 THOUSANDS/ÂΜL (ref 0–0.1)
BASOPHILS NFR BLD AUTO: 0 % (ref 0–1)
BILIRUB SERPL-MCNC: 0.4 MG/DL (ref 0.2–1)
BNP SERPL-MCNC: 272 PG/ML (ref 0–100)
BUN SERPL-MCNC: 25 MG/DL (ref 5–25)
CALCIUM SERPL-MCNC: 9.2 MG/DL (ref 8.4–10.2)
CARDIAC TROPONIN I PNL SERPL HS: 57 NG/L
CARDIAC TROPONIN I PNL SERPL HS: 58 NG/L
CARDIAC TROPONIN I PNL SERPL HS: 67 NG/L
CHLORIDE SERPL-SCNC: 102 MMOL/L (ref 96–108)
CO2 SERPL-SCNC: 28 MMOL/L (ref 21–32)
CREAT SERPL-MCNC: 2.36 MG/DL (ref 0.6–1.3)
EOSINOPHIL # BLD AUTO: 0.08 THOUSAND/ÂΜL (ref 0–0.61)
EOSINOPHIL NFR BLD AUTO: 1 % (ref 0–6)
ERYTHROCYTE [DISTWIDTH] IN BLOOD BY AUTOMATED COUNT: 15 % (ref 11.6–15.1)
GFR SERPL CREATININE-BSD FRML MDRD: 22 ML/MIN/1.73SQ M
GLUCOSE SERPL-MCNC: 92 MG/DL (ref 65–140)
HCT VFR BLD AUTO: 36.2 % (ref 34.8–46.1)
HGB BLD-MCNC: 11 G/DL (ref 11.5–15.4)
IMM GRANULOCYTES # BLD AUTO: 0.02 THOUSAND/UL (ref 0–0.2)
IMM GRANULOCYTES NFR BLD AUTO: 0 % (ref 0–2)
LYMPHOCYTES # BLD AUTO: 1.19 THOUSANDS/ÂΜL (ref 0.6–4.47)
LYMPHOCYTES NFR BLD AUTO: 18 % (ref 14–44)
MCH RBC QN AUTO: 25.2 PG (ref 26.8–34.3)
MCHC RBC AUTO-ENTMCNC: 30.4 G/DL (ref 31.4–37.4)
MCV RBC AUTO: 83 FL (ref 82–98)
MONOCYTES # BLD AUTO: 0.53 THOUSAND/ÂΜL (ref 0.17–1.22)
MONOCYTES NFR BLD AUTO: 8 % (ref 4–12)
NEUTROPHILS # BLD AUTO: 4.82 THOUSANDS/ÂΜL (ref 1.85–7.62)
NEUTS SEG NFR BLD AUTO: 73 % (ref 43–75)
NRBC BLD AUTO-RTO: 0 /100 WBCS
PLATELET # BLD AUTO: 163 THOUSANDS/UL (ref 149–390)
PMV BLD AUTO: 11.9 FL (ref 8.9–12.7)
POTASSIUM SERPL-SCNC: 3.7 MMOL/L (ref 3.5–5.3)
PROT SERPL-MCNC: 7.8 G/DL (ref 6.4–8.4)
QRS AXIS: -79 DEGREES
QRSD INTERVAL: 194 MS
QT INTERVAL: 510 MS
QTC INTERVAL: 550 MS
RBC # BLD AUTO: 4.37 MILLION/UL (ref 3.81–5.12)
SODIUM SERPL-SCNC: 137 MMOL/L (ref 135–147)
T WAVE AXIS: 96 DEGREES
VENTRICULAR RATE: 70 BPM
WBC # BLD AUTO: 6.67 THOUSAND/UL (ref 4.31–10.16)

## 2023-07-22 PROCEDURE — 71045 X-RAY EXAM CHEST 1 VIEW: CPT

## 2023-07-22 PROCEDURE — 93005 ELECTROCARDIOGRAM TRACING: CPT

## 2023-07-22 PROCEDURE — 80053 COMPREHEN METABOLIC PANEL: CPT | Performed by: EMERGENCY MEDICINE

## 2023-07-22 PROCEDURE — 96375 TX/PRO/DX INJ NEW DRUG ADDON: CPT

## 2023-07-22 PROCEDURE — 83880 ASSAY OF NATRIURETIC PEPTIDE: CPT | Performed by: EMERGENCY MEDICINE

## 2023-07-22 PROCEDURE — 36415 COLL VENOUS BLD VENIPUNCTURE: CPT

## 2023-07-22 PROCEDURE — 99223 1ST HOSP IP/OBS HIGH 75: CPT | Performed by: INTERNAL MEDICINE

## 2023-07-22 PROCEDURE — 84484 ASSAY OF TROPONIN QUANT: CPT | Performed by: EMERGENCY MEDICINE

## 2023-07-22 PROCEDURE — 85025 COMPLETE CBC W/AUTO DIFF WBC: CPT | Performed by: EMERGENCY MEDICINE

## 2023-07-22 PROCEDURE — 96365 THER/PROPH/DIAG IV INF INIT: CPT

## 2023-07-22 PROCEDURE — 99284 EMERGENCY DEPT VISIT MOD MDM: CPT

## 2023-07-22 RX ORDER — FUROSEMIDE 10 MG/ML
80 INJECTION INTRAMUSCULAR; INTRAVENOUS ONCE
Status: COMPLETED | OUTPATIENT
Start: 2023-07-22 | End: 2023-07-22

## 2023-07-22 RX ORDER — PANTOPRAZOLE SODIUM 40 MG/1
40 TABLET, DELAYED RELEASE ORAL DAILY
Status: DISCONTINUED | OUTPATIENT
Start: 2023-07-23 | End: 2023-07-25 | Stop reason: HOSPADM

## 2023-07-22 RX ORDER — CEFAZOLIN SODIUM 1 G/50ML
1000 SOLUTION INTRAVENOUS ONCE
Status: COMPLETED | OUTPATIENT
Start: 2023-07-22 | End: 2023-07-22

## 2023-07-22 RX ORDER — FUROSEMIDE 10 MG/ML
60 INJECTION INTRAMUSCULAR; INTRAVENOUS
Status: DISCONTINUED | OUTPATIENT
Start: 2023-07-23 | End: 2023-07-22

## 2023-07-22 RX ORDER — ACETAMINOPHEN 325 MG/1
975 TABLET ORAL ONCE
Status: COMPLETED | OUTPATIENT
Start: 2023-07-22 | End: 2023-07-22

## 2023-07-22 RX ORDER — NICOTINE 21 MG/24HR
1 PATCH, TRANSDERMAL 24 HOURS TRANSDERMAL DAILY
Status: DISCONTINUED | OUTPATIENT
Start: 2023-07-23 | End: 2023-07-25 | Stop reason: HOSPADM

## 2023-07-22 RX ORDER — AMLODIPINE BESYLATE 5 MG/1
5 TABLET ORAL DAILY
Status: DISCONTINUED | OUTPATIENT
Start: 2023-07-23 | End: 2023-07-25 | Stop reason: HOSPADM

## 2023-07-22 RX ORDER — METOPROLOL SUCCINATE 50 MG/1
50 TABLET, EXTENDED RELEASE ORAL DAILY
Status: DISCONTINUED | OUTPATIENT
Start: 2023-07-23 | End: 2023-07-25 | Stop reason: HOSPADM

## 2023-07-22 RX ORDER — CYCLOBENZAPRINE HCL 10 MG
5 TABLET ORAL
Status: DISCONTINUED | OUTPATIENT
Start: 2023-07-22 | End: 2023-07-25 | Stop reason: HOSPADM

## 2023-07-22 RX ORDER — FUROSEMIDE 10 MG/ML
50 INJECTION INTRAMUSCULAR; INTRAVENOUS
Status: DISCONTINUED | OUTPATIENT
Start: 2023-07-23 | End: 2023-07-22

## 2023-07-22 RX ORDER — BUMETANIDE 0.25 MG/ML
1 INJECTION INTRAMUSCULAR; INTRAVENOUS 2 TIMES DAILY
Status: DISCONTINUED | OUTPATIENT
Start: 2023-07-22 | End: 2023-07-23

## 2023-07-22 RX ORDER — TRAMADOL HYDROCHLORIDE 50 MG/1
50 TABLET ORAL ONCE
Status: COMPLETED | OUTPATIENT
Start: 2023-07-22 | End: 2023-07-22

## 2023-07-22 RX ORDER — ACETAMINOPHEN 325 MG/1
650 TABLET ORAL EVERY 6 HOURS PRN
Status: DISCONTINUED | OUTPATIENT
Start: 2023-07-22 | End: 2023-07-25 | Stop reason: HOSPADM

## 2023-07-22 RX ORDER — DOXAZOSIN MESYLATE 1 MG/1
2 TABLET ORAL
Status: DISCONTINUED | OUTPATIENT
Start: 2023-07-22 | End: 2023-07-25 | Stop reason: HOSPADM

## 2023-07-22 RX ADMIN — BUMETANIDE 1 MG: 0.25 INJECTION INTRAMUSCULAR; INTRAVENOUS at 18:46

## 2023-07-22 RX ADMIN — DOXAZOSIN 2 MG: 1 TABLET ORAL at 21:26

## 2023-07-22 RX ADMIN — ACETAMINOPHEN 975 MG: 325 TABLET, FILM COATED ORAL at 15:35

## 2023-07-22 RX ADMIN — ACETAMINOPHEN 650 MG: 325 TABLET, FILM COATED ORAL at 22:36

## 2023-07-22 RX ADMIN — FUROSEMIDE 80 MG: 10 INJECTION, SOLUTION INTRAMUSCULAR; INTRAVENOUS at 15:36

## 2023-07-22 RX ADMIN — CEFAZOLIN SODIUM 1000 MG: 1 SOLUTION INTRAVENOUS at 16:26

## 2023-07-22 RX ADMIN — TRAMADOL HYDROCHLORIDE 50 MG: 50 TABLET ORAL at 16:47

## 2023-07-22 RX ADMIN — CYCLOBENZAPRINE 5 MG: 10 TABLET, FILM COATED ORAL at 21:28

## 2023-07-22 NOTE — ASSESSMENT & PLAN NOTE
Wt Readings from Last 3 Encounters:   07/22/23 111 kg (244 lb 0.8 oz)   07/21/23 110 kg (243 lb)   07/17/23 109 kg (239 lb 6.4 oz)       • On presentation with shortness of breath on exertion, orthopnea, progressive lower extremity edema, pain  • History of EFrHF that transitioned to EFpHF on daily Lasix 40 mg. Reports taking extra doses of Lasix 40 mg due to progressive lower extremity edema. • Weight gain about 4 pounds in 5 days. Reports compliance with low-salt diet and uses Mr. Leon Leonard as a salt substitute. • History of V tachycardia s/p ICD in 2016, that induced cardiomyopathy. • Patient with cocaine abuse, reported in cardiology note, last dose 3/23. Patient with tobacco abuse 1/2 pack a day as well as occasional marijuana use. Denies alcohol use  • Reported compliance with home medication. • Bilateral lower extremities 2+ edema  •  however not correlated with clinical aspect, patient appeared fluid overloaded.   • Troponin 57<58  • Echo on 2/57/71: EF 65 % Systolic function is normal.  Wall motion is normal. Diastolic function is normal  • EKG at baseline     Plan:  Start IV Bumex 1 mg 2 times a day  Monitor daily I's/O  Monitor daily weight  Heart failure team consulted appreciated input  Continue low-salt diet and fluid restriction
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
## 2023-07-22 NOTE — ASSESSMENT & PLAN NOTE
Reports currently smoking half a pack a day. Is been a smoker since she was 13.   Nicotine replacement was provided  Smoking cessation was recommended

## 2023-07-22 NOTE — ASSESSMENT & PLAN NOTE
Presents with worsening in bilateral lower extremity edema. Patient also reports pain, erythema for the past 2 days. Denies chills, fever. Tender and warm to touch. S/p 1 dose of cefazolin 1 g in the ED due to concern for cellulitis.   We will monitor for off of the antibiotics since likely related to the lower extremity edema
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
## 2023-07-22 NOTE — ASSESSMENT & PLAN NOTE
Lab Results   Component Value Date    EGFR 22 07/22/2023    EGFR 25 07/17/2023    EGFR 23 07/16/2023    CREATININE 2.36 (H) 07/22/2023    CREATININE 2.11 (H) 07/17/2023    CREATININE 2.21 (H) 07/16/2023     · CKD with creatinine baseline 1.6-2.2. Slightly elevated creatinine 2.36. Suspected in the setting of CHF exacerbation.   · Reported decreased urine output despite Lasix  · Daily I's and O's and daily weight  · Avoid nephrotoxic agents and hypoperfusion

## 2023-07-22 NOTE — ED ATTENDING ATTESTATION
7/22/2023  ISirena MD, saw and evaluated the patient. I have discussed the patient with the resident/non-physician practitioner and agree with the resident's/non-physician practitioner's findings, Plan of Care, and MDM as documented in the resident's/non-physician practitioner's note, except where noted. All available labs and Radiology studies were reviewed. I was present for key portions of any procedure(s) performed by the resident/non-physician practitioner and I was immediately available to provide assistance. At this point I agree with the current assessment done in the Emergency Department. I have conducted an independent evaluation of this patient a history and physical is as follows:    Patient is a 51-year-old female with history of hyperlipidemia, CAD, atrial fibrillation on Xarelto and CHF. She was hospitalized a week ago for CHF exacerbation, had significant improvement in symptoms and went home on doubled dose of Lasix-40 mg twice daily. Since then she reports compliance with medication as well as diet-sodium and fluid intake but progressive worsening of leg swelling. She additionally notes some orthopnea which she denies was present around time of last hospitalization. She appreciates apnea with exertion and also intense pain and sensitivity in her lower legs. She saw her cardiologist yesterday and took new dose of Lasix 80 mg yesterday evening as well as this morning. She has voided only once today-prior to taking Lasix. She notes that she would expect to urinate more than she has been. No chest pain, palpitations, fever or cough. On exam she appears mildly malaised. Heart sounds are regular. Lungs with rales in the distal third. No wheezing. Not hypoxic. Abdomen soft and nontender. Thighs and knees nontender. Calves are exquisitely tender especially the lower two thirds circumferentially bilaterally.   She has petechial erythema and significant increased warmth over these regions. Skin is slightly taut. No open areas are appreciated. There is no drainage. +2 bilateral PT and DP pulses. Feet and ankles are nontender. Mild ankle edema present. Wt Readings from Last 3 Encounters:   07/23/23 109 kg (239 lb 3.2 oz)   07/21/23 110 kg (243 lb)   07/17/23 109 kg (239 lb 6.4 oz)     Have concern for volume overload as a result of CHF exacerbation and/or renal insufficiency. Leg discomfort likely partly secondary to swelling alone. Additionally have concern for possible hypokalemia to account for her descriptive latosha horse cramp sensation. Given significant increased warmth in this region do have concern for cellulitis as well-strep most likely. Will cover with antibiotics. Concerned that patient's weight continues to increase & symptoms of leg swelling and dyspnea worsen despite recent increases in diuretic dosing. She has not voided at all following initial 80 mg dose of Lasix today. She may require a change in diuretic. ED Course     Although patient is not in respiratory distress at rest she has not been making adequate urine output despite increased diuretic dose. GFR has also been decreasing. Will bring in for close monitoring of fluid status and renal function with goal of diuresis. IV antibiotics initiated for cellulitis. Anticipate she will be transitioned over to oral ones.     Critical Care Time  Procedures

## 2023-07-22 NOTE — ASSESSMENT & PLAN NOTE
• Home meds amlodipine 5 mg daily, metoprolol 50 mg daily, Cardura 2 mg daily, Lasix 40 mg daily. • Reported compliance with home meds.     Plan:  • Close monitor. • Continue home medication  Will start IV Bumex 1 mg bid.

## 2023-07-23 PROBLEM — D69.6 THROMBOCYTOPENIA (HCC): Status: ACTIVE | Noted: 2023-07-23

## 2023-07-23 PROBLEM — E87.6 HYPOKALEMIA: Status: ACTIVE | Noted: 2023-07-23

## 2023-07-23 LAB
ANION GAP SERPL CALCULATED.3IONS-SCNC: 7 MMOL/L
BUN SERPL-MCNC: 26 MG/DL (ref 5–25)
CALCIUM SERPL-MCNC: 8.7 MG/DL (ref 8.4–10.2)
CHLORIDE SERPL-SCNC: 103 MMOL/L (ref 96–108)
CO2 SERPL-SCNC: 28 MMOL/L (ref 21–32)
CREAT SERPL-MCNC: 2.1 MG/DL (ref 0.6–1.3)
ERYTHROCYTE [DISTWIDTH] IN BLOOD BY AUTOMATED COUNT: 15.1 % (ref 11.6–15.1)
GFR SERPL CREATININE-BSD FRML MDRD: 25 ML/MIN/1.73SQ M
GLUCOSE SERPL-MCNC: 103 MG/DL (ref 65–140)
HCT VFR BLD AUTO: 34.5 % (ref 34.8–46.1)
HGB BLD-MCNC: 10.4 G/DL (ref 11.5–15.4)
MAGNESIUM SERPL-MCNC: 2.2 MG/DL (ref 1.9–2.7)
MCH RBC QN AUTO: 25.2 PG (ref 26.8–34.3)
MCHC RBC AUTO-ENTMCNC: 30.1 G/DL (ref 31.4–37.4)
MCV RBC AUTO: 84 FL (ref 82–98)
PLATELET # BLD AUTO: 117 THOUSANDS/UL (ref 149–390)
PMV BLD AUTO: 11.7 FL (ref 8.9–12.7)
POTASSIUM SERPL-SCNC: 3.3 MMOL/L (ref 3.5–5.3)
RBC # BLD AUTO: 4.12 MILLION/UL (ref 3.81–5.12)
SODIUM SERPL-SCNC: 138 MMOL/L (ref 135–147)
WBC # BLD AUTO: 3.42 THOUSAND/UL (ref 4.31–10.16)

## 2023-07-23 PROCEDURE — 80048 BASIC METABOLIC PNL TOTAL CA: CPT

## 2023-07-23 PROCEDURE — 83735 ASSAY OF MAGNESIUM: CPT

## 2023-07-23 PROCEDURE — 99232 SBSQ HOSP IP/OBS MODERATE 35: CPT | Performed by: INTERNAL MEDICINE

## 2023-07-23 PROCEDURE — 85027 COMPLETE CBC AUTOMATED: CPT

## 2023-07-23 PROCEDURE — 99255 IP/OBS CONSLTJ NEW/EST HI 80: CPT | Performed by: INTERNAL MEDICINE

## 2023-07-23 RX ORDER — POTASSIUM CHLORIDE 20 MEQ/1
40 TABLET, EXTENDED RELEASE ORAL ONCE
Status: DISCONTINUED | OUTPATIENT
Start: 2023-07-23 | End: 2023-07-25 | Stop reason: HOSPADM

## 2023-07-23 RX ORDER — BUMETANIDE 0.25 MG/ML
2 INJECTION INTRAMUSCULAR; INTRAVENOUS
Status: DISCONTINUED | OUTPATIENT
Start: 2023-07-23 | End: 2023-07-25

## 2023-07-23 RX ORDER — POTASSIUM CHLORIDE 14.9 MG/ML
20 INJECTION INTRAVENOUS
Status: COMPLETED | OUTPATIENT
Start: 2023-07-23 | End: 2023-07-23

## 2023-07-23 RX ORDER — BUMETANIDE 0.25 MG/ML
1 INJECTION INTRAMUSCULAR; INTRAVENOUS ONCE
Status: COMPLETED | OUTPATIENT
Start: 2023-07-23 | End: 2023-07-23

## 2023-07-23 RX ADMIN — POTASSIUM CHLORIDE 20 MEQ: 14.9 INJECTION, SOLUTION INTRAVENOUS at 15:06

## 2023-07-23 RX ADMIN — POTASSIUM CHLORIDE 20 MEQ: 14.9 INJECTION, SOLUTION INTRAVENOUS at 09:38

## 2023-07-23 RX ADMIN — NICOTINE 1 PATCH: 14 PATCH, EXTENDED RELEASE TRANSDERMAL at 08:08

## 2023-07-23 RX ADMIN — DOXAZOSIN 2 MG: 1 TABLET ORAL at 21:02

## 2023-07-23 RX ADMIN — RIVAROXABAN 15 MG: 15 TABLET, FILM COATED ORAL at 18:11

## 2023-07-23 RX ADMIN — BUMETANIDE 1 MG: 0.25 INJECTION INTRAMUSCULAR; INTRAVENOUS at 09:43

## 2023-07-23 RX ADMIN — AMLODIPINE BESYLATE 5 MG: 5 TABLET ORAL at 08:09

## 2023-07-23 RX ADMIN — CYCLOBENZAPRINE 5 MG: 10 TABLET, FILM COATED ORAL at 21:02

## 2023-07-23 RX ADMIN — BUMETANIDE 2 MG: 0.25 INJECTION INTRAMUSCULAR; INTRAVENOUS at 15:07

## 2023-07-23 RX ADMIN — METOPROLOL SUCCINATE 50 MG: 50 TABLET, EXTENDED RELEASE ORAL at 08:09

## 2023-07-23 RX ADMIN — PANTOPRAZOLE SODIUM 40 MG: 40 TABLET, DELAYED RELEASE ORAL at 08:09

## 2023-07-23 RX ADMIN — ACETAMINOPHEN 650 MG: 325 TABLET, FILM COATED ORAL at 21:01

## 2023-07-23 RX ADMIN — BUMETANIDE 1 MG: 0.25 INJECTION INTRAMUSCULAR; INTRAVENOUS at 08:08

## 2023-07-23 NOTE — CONSULTS
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 abnormality. Findings are concordant with preliminary interpretation provided in the Emergency Department. Workstation performed: [id number]     Thank you for allowing us to participate in this patient's care. This pt will follow up with Dr. Darryn Alvarez once discharged. Counseling / Coordination of Care  Total floor / unit time spent today 45 minutes. Greater than 50% of total time was spent with the patient and / or family counseling and / or coordination of care. A description of the counseling / coordination of care: Review of history, current assessment, development of a plan.     Code Status: Level 1 - Full Code    ** Please Note: Dragon 360 Dictation voice to text software may have been used in the creation of this document.  **

## 2023-07-23 NOTE — ASSESSMENT & PLAN NOTE
Assessment:  · Potassium 3.3     Plan:  · Repleted Potassium with 2 doses of IV potassium 20 mg  · Follow up BMP   · She refuses to take her Potassium pill because of its size

## 2023-07-23 NOTE — ASSESSMENT & PLAN NOTE
Lab Results   Component Value Date    EGFR 22 07/22/2023    EGFR 25 07/17/2023    EGFR 23 07/16/2023    CREATININE 2.36 (H) 07/22/2023    CREATININE 2.11 (H) 07/17/2023    CREATININE 2.21 (H) 07/16/2023     Assessment & Plan:   · CKD with creatinine baseline 1.6-2.2. Slightly elevated creatinine 2.36. Suspected in the setting of CHF exacerbation.   · Patient is urinating normally today   · Monitor I's & O's   · Monitor Weight   · Avoid nephrotoxic agents and hypoperfusion

## 2023-07-23 NOTE — UTILIZATION REVIEW
Initial Clinical Review    Admission: Date/Time/Statement:   Admission Orders (From admission, onward)     Ordered        07/22/23 1656  INPATIENT ADMISSION  Once                      Orders Placed This Encounter   Procedures   • INPATIENT ADMISSION     Standing Status:   Standing     Number of Occurrences:   1     Order Specific Question:   Level of Care     Answer:   Med Surg [16]     Order Specific Question:   Estimated length of stay     Answer:   More than 2 Midnights     Order Specific Question:   Certification     Answer:   I certify that inpatient services are medically necessary for this patient for a duration of greater than two midnights. See H&P and MD Progress Notes for additional information about the patient's course of treatment. ED Arrival Information     Expected   -    Arrival   7/22/2023 13:16    Acuity   Urgent            Means of arrival   Walk-In    Escorted by   Self    Service   Hospitalist    Admission type   Emergency            Arrival complaint   Leg Swelling           Chief Complaint   Patient presents with   • Leg Swelling     Hx chf. Worsening BL leg swelling noticed yesterday. C/o sob       Initial Presentation: 62 y.o. female PMH congestive heart failure with now preserved ejection fraction, A-fib s/p ablation w/ pacemaker, CKD stage III presents self to ED w worsening LE edema, Dyspnea, unintentional WT gain 4 LBs. Recently DC 7/17 due to CP , concern for fluid overload & received 1 dose IV Lasix nw initial improvement now gained WT in 5 days. Reports compliance w meds, low NA diet; states took extra dose Lasix yesterday, OP Cardiology rec increasing Lasix to 80 mg BID yesterday but had worsening SOB/ LE edema    EXAM  basal crackles, + SOB, LE edema-Calves are exquisitely tender especially the lower two thirds circumferentially bilaterally, mild ankle edema, leukopenia, sl elevated BNP/ soft + TROP; CXR mild pulmonary congestion.  Given 80 mg IV Lasix    Inpatient admission due to acute on chronic HF w preserved EF, floyd LE edema, essential HTN,   CKD 3  Start IV Bumex BID,  follow BMP,  I/O, daily WT, recs per consult HF Team; low NA diet & fluid restrict; Monitor LE off antibx for Cellulitis,  AC & recommend smoking cessation; Nicotine patch  Date: 7/23/2023   Day 2:   Cardiology  outpatient diuretics were first increased but she had no effect to this. Now continue IV Bumex. Agree with increasing dose  Follow urine output and labs closely. Daily weights. When transition back to oral diuretic therapy  suggest using torsemide or Bumex instead of Lasix. Attending  No SOB today but + chest tightness ; floyd LE tenderness & pain w erythematous rash on lower legs, warm & tender to touch. Recs per Cardiology, potassium replaced. Mild anemia & thrombocytopenia wo bleeding- monitor.  Obtain  VAS LE for floyd leg pains; consult PT/OT  ED Triage Vitals   Temperature Pulse Respirations Blood Pressure SpO2   07/22/23 1327 07/22/23 1327 07/22/23 1327 07/22/23 1327 07/22/23 1327   97.7 °F (36.5 °C) 69 20 (!) 172/84 97 %      Temp Source Heart Rate Source Patient Position - Orthostatic VS BP Location FiO2 (%)   07/22/23 1327 07/22/23 1327 07/22/23 1327 07/22/23 1327 --   Oral Monitor Sitting Left arm       Pain Score       07/22/23 1633       4          Wt Readings from Last 1 Encounters:   07/23/23 109 kg (239 lb 3.2 oz)     Additional Vital Signs:   Date/Time Temp Pulse Resp BP MAP (mmHg) SpO2 O2 Device Patient Position - Orthostatic VS   07/23/23 15:14:33 98.6 °F (37 °C) -- -- 136/88 104 -- -- --   07/23/23 0800 -- -- -- -- -- -- None (Room air) --   07/23/23 07:24:54 98.3 °F (36.8 °C) 70 15 120/65 83 97 % -- --   07/23/23 03:09:25 98 °F (36.7 °C) 70 -- 99/61 74 95 % -- --   07/23/23 03:09:18 98 °F (36.7 °C) 70 -- 99/61 74 95 % -- --   07/23/23 01:08:09 -- 70 -- 107/56 73 95 % -- --   07/22/23 22:35:37 97.8 °F (36.6 °C) 70 -- 100/61 74 96 % -- --   07/22/23 2233 97.8 °F (36.6 °C) -- -- -- -- -- -- --   07/22/23 21:29:18 -- -- -- 114/72 86 -- -- --   07/22/23 2126 -- -- -- 114/72 -- -- -- --   07/22/23 18:46:37 -- -- -- 127/73 91 -- -- --   07/22/23 1802 -- 69 18 100/52 72 96 % None (Room air) Lying   07/22/23 1535 -- 69 20 142/55 -- 98 % None (Room air) Sitting   07/22/23 1327 97.7 °F (36.5 °C) 69 20 172/84 Abnormal  -- 97 % None (Room air) Sitting     Weights (last 14 days)    Date/Time Weight Weight Method   07/23/23 0500 109 kg (239 lb 3.2 oz) Standing scale   07/22/23 1327 111 kg (244 lb 0.8 oz) Bed scale       Pertinent Labs/Diagnostic Test Results:   7/22  Ecg=  Component Ref Range & Units 7/22/23 1350   Ventricular Rate BPM 70    Atrial Rate BPM 64    LA Interval ms    QRSD Interval ms 194    QT Interval ms 510    QTC Interval ms 550    P Axis degrees    QRS Axis degrees -79    T Wave Axis degrees 96        XR chest 1 view portable   Final Result by John Talavera MD (07/23 1003)      Mild pulmonary venous congestion.          VAS lower limb venous duplex study, complete bilateral    (Results Pending)         Results from last 7 days   Lab Units 07/23/23  0506 07/22/23  1343 07/17/23  0427   WBC Thousand/uL 3.42* 6.67 3.79*   HEMOGLOBIN g/dL 10.4* 11.0* 10.2*   HEMATOCRIT % 34.5* 36.2 33.4*   PLATELETS Thousands/uL 117* 163 115*   NEUTROS ABS Thousands/µL  --  4.82  --          Results from last 7 days   Lab Units 07/23/23  0506 07/22/23  1343 07/17/23  0426   SODIUM mmol/L 138 137 139   POTASSIUM mmol/L 3.3* 3.7 3.7   CHLORIDE mmol/L 103 102 108   CO2 mmol/L 28 28 24   ANION GAP mmol/L 7 7 7   BUN mg/dL 26* 25 22   CREATININE mg/dL 2.10* 2.36* 2.11*   EGFR ml/min/1.73sq m 25 22 25   CALCIUM mg/dL 8.7 9.2 8.7   MAGNESIUM mg/dL 2.2  --   --      Results from last 7 days   Lab Units 07/22/23  1343   AST U/L 25   ALT U/L 18   ALK PHOS U/L 66   TOTAL PROTEIN g/dL 7.8   ALBUMIN g/dL 4.1   TOTAL BILIRUBIN mg/dL 0.40         Results from last 7 days   Lab Units 07/23/23  0506 07/22/23  1343 07/17/23  0426   GLUCOSE RANDOM mg/dL 103 92 106             No results found for: "BETA-HYDROXYBUTYRATE"                   Results from last 7 days   Lab Units 07/22/23  1734 07/22/23  1542 07/22/23  1343 07/16/23  1926   HS TNI 0HR ng/L  --   --  57*  --    HS TNI 2HR ng/L  --  58*  --   --    HSTNI D2 ng/L  --  1  --   --    HS TNI 4HR ng/L 67*  --   --  53*   HSTNI D4 ng/L 10  --   --  6         Results from last 7 days   Lab Units 07/22/23  1343   BNP pg/mL 272*                     ED Treatment:   Medication Administration from 07/22/2023 1316 to 07/22/2023 1827       Date/Time Order Dose Route Action     07/22/2023 1536 EDT furosemide (LASIX) injection 80 mg 80 mg Intravenous Given     07/22/2023 1535 EDT acetaminophen (TYLENOL) tablet 975 mg 975 mg Oral Given     07/22/2023 1626 EDT ceFAZolin (ANCEF) IVPB (premix in dextrose) 1,000 mg 50 mL 1,000 mg Intravenous New Bag     07/22/2023 1647 EDT traMADol (ULTRAM) tablet 50 mg 50 mg Oral Given        Past Medical History:   Diagnosis Date   • ACS (acute coronary syndrome) ([address]) 02/07/2021   • Acute on chronic diastolic CHF 42/90/5752   • Arthritis    • Atrial fibrillation (HCC)    • Atrial fibrillation with rapid ventricular response ([address]) 03/20/2016   • Breast lump    • CKD (chronic kidney disease) stage 3, GFR 30-59 ml/min ([facility])    • Disease of thyroid gland    • Elevated troponin 09/09/2019   • Epigastric abdominal tenderness 08/15/2021   • 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 ([address]) 05/25/2020   • GERD (gastroesophageal reflux disease)    • H/O transfusion 1987   • Hepatitis C     resolved   • History of tachycardia induced cardiomyopathy 05/2021    in setting of rapid atrial flutter in 05/2021 and again 06/2022   • History of ventricular tachycardia 07/2016    s/p ICD   • Hyperlipidemia    • Hypertension    • Inappropriate ICD discharge for atrial flutter 04/2023   • NSTEMI (non-ST elevated myocardial infarction) ([address]) 12/26/2018 • Sleep apnea     no cpap     Present on Admission:  • Paroxysmal A-fib (HCC)  • Tobacco abuse  • Essential hypertension  • Chronic kidney disease stage 3   • Anemia  • Elevated troponin      Admitting Diagnosis: Cellulitis [L03.90]  Leg swelling [M79.89]  CHF exacerbation (HCC) [I50.9]  Bilateral lower extremity edema [R60.0]  Age/Sex: 62 y.o. female  Admission Orders:  VAS floyd LE  Daily WT  Strict fluid 1500 ML  I/O  Cardiac diet 2 GM NA  Up as enedina      Scheduled Medications:  amLODIPine, 5 mg, Oral, Daily  bumetanide, 2 mg, Intravenous, BID (diuretic)  cyclobenzaprine, 5 mg, Oral, HS  doxazosin, 2 mg, Oral, HS  metoprolol succinate, 50 mg, Oral, Daily  nicotine, 1 patch, Transdermal, Daily  pantoprazole, 40 mg, Oral, Daily  potassium chloride, 40 mEq, Oral, Once  potassium chloride, 20 mEq, Intravenous, Q2H  rivaroxaban, 15 mg, Oral, Daily With Dinner    Continuous IV Infusions:     PRN Meds:  acetaminophen, 650 mg, Oral, Q6H PRN      IP CONSULT TO NUTRITION SERVICES  IP CONSULT TO HEART FAILURE SERVICE    Network Utilization Review Department  ATTENTION: Please call with any questions or concerns to [phone] and carefully listen to the prompts so that you are directed to the right person. All voicemails are confidential.  Nitin Bunn all requests for admission clinical reviews, approved or denied determinations and any other requests to dedicated fax number below belonging to the campus where the patient is receiving treatment.  List of dedicated fax numbers for the Facilities:  [place] DENIALS (Administrative/Medical Necessity) [phone] [address] [facility] (Maternity/NICU/Pediatrics) [phone]   [address] [place] [address] Drive [phone]   [facility] [address] [facility] [phone] [address]  - [address] [phone]   [address] [place] [address] [phone]

## 2023-07-23 NOTE — ASSESSMENT & PLAN NOTE
Assessment:  · Smokes currently 1/2 pack/ day.  She has been a smoker for 43 years     Plan:  · Nicotine replacement was provided  · Smoking cessation was recommended
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
## 2023-07-23 NOTE — PLAN OF CARE
Problem: INFECTION - ADULT  Goal: Absence or prevention of progression during hospitalization  Description: INTERVENTIONS:  - Assess and monitor for signs and symptoms of infection  - Monitor lab/diagnostic results  - Monitor all insertion sites, i.e. indwelling lines, tubes, and drains  - Monitor endotracheal if appropriate and nasal secretions for changes in amount and color  - [place] appropriate cooling/warming therapies per order  - Administer medications as ordered  - Instruct and encourage patient and family to use good hand hygiene technique  - Identify and instruct in appropriate isolation precautions for identified infection/condition  Outcome: Progressing

## 2023-07-23 NOTE — ASSESSMENT & PLAN NOTE
Assessment:   • Home meds amlodipine 5 mg daily, metoprolol 50 mg daily, Cardura 2 mg daily, Lasix 40 mg daily.   • Reported compliance with home medications   • Stable Blood pressures      Plan:  • Monitor VS   • Continue home medication

## 2023-07-23 NOTE — ASSESSMENT & PLAN NOTE
Wt Readings from Last 3 Encounters:   07/22/23 111 kg (244 lb 0.8 oz)   07/21/23 110 kg (243 lb)   07/17/23 109 kg (239 lb 6.4 oz)     Assessment:   · Presented with SOB with exertion, Orthopnea, bilateral lower extremity edema and pain   · Patient was seen by Cardiology outpatient one day prior to her admission; they increased her Lasix to 80 mg BID from 40 mg once daily and started her on Potassium 20 mg tablet. • She was admitted on 7-16 due to chest pain and concern of fluid overload, given IV Lasix 40 mg x 1 and discharged. • Weight decreased from 244 Ibs on admission to 239 Ibs   • She uses cocaine, last reported in cardiology note on 3/23. Occasional Marijuana. Smoker.    •    • Troponin 57<58  • Last Echo on 3/11/84: EF 65 % Systolic function is normal. Wall motion is normal. Diastolic function is normal  • EKG at baseline     Plan:  · Per Cardiology: Increase IV Bumex 3 mg 2 times a day  · Monitor daily I's/O  · Monitor daily weight  · Continue low-salt diet and fluid restriction

## 2023-07-23 NOTE — PROGRESS NOTES
[address] [facility]  Progress Note  Name: Ly White  MRN: [id number]  Unit/Bed#: S -01 I Date of Admission: 7/22/2023   Date of Service: 7/23/2023 I Hospital Day: 1    Assessment/Plan   * Acute on chronic diastolic CHF (congestive heart failure) (HCC)  Assessment & Plan  Wt Readings from Last 3 Encounters:   07/22/23 111 kg (244 lb 0.8 oz)   07/21/23 110 kg (243 lb)   07/17/23 109 kg (239 lb 6.4 oz)     Assessment:   · Presented with SOB with exertion, Orthopnea, bilateral lower extremity edema and pain   · Patient was seen by Cardiology outpatient one day prior to her admission; they increased her Lasix to 80 mg BID from 40 mg once daily and started her on Potassium 20 mg tablet. • She was admitted on 7-16 due to chest pain and concern of fluid overload, given IV Lasix 40 mg x 1 and discharged. • Weight decreased from 244 Ibs on admission to 239 Ibs   • She uses cocaine, last reported in cardiology note on 3/23. Occasional Marijuana. Smoker. •    • Troponin 57<58  • Last Echo on 4/43/92: EF 65 % Systolic function is normal. Wall motion is normal. Diastolic function is normal  • EKG at baseline     Plan:  · Per Cardiology: Increase IV Bumex 3 mg 2 times a day  · Monitor daily I's/O  · Monitor daily weight  · Continue low-salt diet and fluid restriction    Bilateral lower extremity edema  Assessment & Plan  Assessment:  · Patient's Edema has resolved today. Upon exam; rrythematous non itchy rash over bilateral lower legs, both legs were tender and warm to touch. · She stated that this appeared 2 days ago.      Plan:  · We will monitor for off of the antibiotics since low suspicion for cellulitis and likely related to the interstitial edema    Thrombocytopenia (HCC)  Assessment & Plan  Assessment & Plan:  · Patient's Platelet dropped from 163 on admission to 117   · WBC dropped from 6.67 on admission to 3.42   · Follow up CBC     Hypokalemia  Assessment & Plan  Assessment:  · Potassium 3.3     Plan:  · Repleted Potassium with 2 doses of IV potassium 20 mg  · Follow up BMP   · She refuses to take her Potassium pill because of its size     Essential hypertension  Assessment & Plan  Assessment:   • Home meds amlodipine 5 mg daily, metoprolol 50 mg daily, Cardura 2 mg daily, Lasix 40 mg daily. • Reported compliance with home medications   • Stable Blood pressures      Plan:  • Monitor VS   • Continue home medication        Chronic kidney disease stage 3   Assessment & Plan  Lab Results   Component Value Date    EGFR 22 07/22/2023    EGFR 25 07/17/2023    EGFR 23 07/16/2023    CREATININE 2.36 (H) 07/22/2023    CREATININE 2.11 (H) 07/17/2023    CREATININE 2.21 (H) 07/16/2023     Assessment & Plan:   · CKD with creatinine baseline 1.6-2.2. Slightly elevated creatinine 2.36. Suspected in the setting of CHF exacerbation. · Patient is urinating normally today   · Monitor I's & O's   · Monitor Weight   · Avoid nephrotoxic agents and hypoperfusion        Paroxysmal A-fib [facility])  Assessment & Plan  Assessment & Plan:  · S/p AVJ ablation with ventricle pacemaker. Paced rhythm @70. · Xarelto 15 mg daily. Elevated troponin  Assessment & Plan  · No intervention required    Tobacco abuse  Assessment & Plan  Assessment:  · Smokes currently 1/2 pack/ day. She has been a smoker for 43 years     Plan:  · Nicotine replacement was provided  · Smoking cessation was recommended         VTE Pharmacologic Prophylaxis: VTE Score: 3 Moderate Risk (Score 3-4) - Pharmacological DVT Prophylaxis Ordered: dabigatran (Pradaxa). Patient Centered Rounds: I performed bedside rounds with nursing staff today. Discussions with Specialists or Other Care Team Provider: Cardiology & Nursing     Education and Discussions with Family / Patient: Patient declined call to .      Current Length of Stay: 1 day(s)  Current Patient Status: Inpatient   Discharge Plan: Anticipate discharge in 24-48 hrs to home. Code Status: Level 1 - Full Code    Subjective:   She denied any SOB with exertion. She did complain of chest tightness. She complained of bilateral lower legs tenderness and pain. She also has erythematous rash over her lower legs, she stated that this appeared after her last discharge. She denied any abdominal pain or diarrhea. Objective:   She was sitting comfortably in bed not in pain or discomfort. Upon exam, I noticed her rash on lower legs and they were warm to touch and tender. Vitals:   Temp (24hrs), Av °F (36.7 °C), Min:97.8 °F (36.6 °C), Max:98.3 °F (36.8 °C)    Temp:  [97.8 °F (36.6 °C)-98.3 °F (36.8 °C)] 98.3 °F (36.8 °C)  HR:  [69-70] 70  Resp:  [15-20] 15  BP: ()/(52-73) 120/65  SpO2:  [95 %-98 %] 97 %  Body mass index is 37.46 kg/m². Input and Output Summary (last 24 hours): Intake/Output Summary (Last 24 hours) at 2023 1337  Last data filed at 2023 1201  Gross per 24 hour   Intake 240 ml   Output 1125 ml   Net -885 ml       Physical Exam:   Physical Exam  Constitutional:       Appearance: Normal appearance. Eyes:      Extraocular Movements: Extraocular movements intact. Cardiovascular:      Rate and Rhythm: Normal rate and regular rhythm. Pulses: Normal pulses. Heart sounds: Normal heart sounds. Pulmonary:      Breath sounds: Examination of the right-lower field reveals decreased breath sounds. Decreased breath sounds present. Comments: Faint Breath sounds on the right, But normal on the left. Musculoskeletal:      Right lower leg: No edema. Left lower leg: No edema. Skin:            Comments: Erythematous rash with warmth and tenderness. No Swelling    Neurological:      Mental Status: She is alert.          Additional Data:     Labs:  Results from last 7 days   Lab Units 23  0506 23  1343   WBC Thousand/uL 3.42* 6.67   HEMOGLOBIN g/dL 10.4* 11.0*   HEMATOCRIT % 34.5* 36.2   PLATELETS Thousands/uL 117* 163   NEUTROS PCT %  --  73   LYMPHS PCT %  --  18   MONOS PCT %  --  8   EOS PCT %  --  1     Results from last 7 days   Lab Units 07/23/23  0506 07/22/23  1343   SODIUM mmol/L 138 137   POTASSIUM mmol/L 3.3* 3.7   CHLORIDE mmol/L 103 102   CO2 mmol/L 28 28   BUN mg/dL 26* 25   CREATININE mg/dL 2.10* 2.36*   ANION GAP mmol/L 7 7   CALCIUM mg/dL 8.7 9.2   ALBUMIN g/dL  --  4.1   TOTAL BILIRUBIN mg/dL  --  0.40   ALK PHOS U/L  --  66   ALT U/L  --  18   AST U/L  --  25   GLUCOSE RANDOM mg/dL 103 92                       Lines/Drains:  Invasive Devices     Peripheral Intravenous Line  Duration           Peripheral IV 07/22/23 Left Arm <1 day    Peripheral IV 07/22/23 Right Arm <1 day                      Imaging: Reviewed radiology reports from this admission including: chest xray    Recent Cultures (last 7 days):         Last 24 Hours Medication List:   Current Facility-Administered Medications   Medication Dose Route Frequency Provider Last Rate   • acetaminophen  650 mg Oral Q6H PRN Kenneth Sr MD     • amLODIPine  5 mg Oral Daily Lauren Carl MD     • bumetanide  2 mg Intravenous BID (diuretic) CHARLOTTE Carter     • cyclobenzaprine  5 mg Oral HS Lauren Carl MD     • doxazosin  2 mg Oral HS Lauren Carl MD     • metoprolol succinate  50 mg Oral Daily Lauren Carl MD     • nicotine  1 patch Transdermal Daily Lauren Carl MD     • pantoprazole  40 mg Oral Daily Lauren Carl MD     • potassium chloride  40 mEq Oral Once Sammie Beaver DO     • rivaroxaban  15 mg Oral Daily With Dinner Lauren Carl MD          Today, Patient Was Seen By: Roise Landau, MD    **Please Note: This note may have been constructed using a voice recognition system. **

## 2023-07-23 NOTE — ASSESSMENT & PLAN NOTE
Assessment:  · Patient's Edema has resolved today. Upon exam; rrythematous non itchy rash over bilateral lower legs, both legs were tender and warm to touch. · She stated that this appeared 2 days ago.      Plan:  · We will monitor for off of the antibiotics since low suspicion for cellulitis and likely related to the interstitial edema

## 2023-07-23 NOTE — ASSESSMENT & PLAN NOTE
Assessment & Plan:  · S/p AVJ ablation with ventricle pacemaker. Paced rhythm @70. · Xarelto 15 mg daily.

## 2023-07-24 ENCOUNTER — HOSPITAL ENCOUNTER (INPATIENT)
Dept: VASCULAR ULTRASOUND | Facility: HOSPITAL | Age: 58
Discharge: HOME | DRG: 194 | End: 2023-07-24
Payer: COMMERCIAL

## 2023-07-24 ENCOUNTER — DOCUMENTATION (OUTPATIENT)
Dept: CARDIOLOGY CLINIC | Facility: CLINIC | Age: 58
End: 2023-07-24

## 2023-07-24 LAB
ANION GAP SERPL CALCULATED.3IONS-SCNC: 6 MMOL/L
ATRIAL RATE: 64 BPM
BUN SERPL-MCNC: 24 MG/DL (ref 5–25)
CALCIUM SERPL-MCNC: 9.1 MG/DL (ref 8.4–10.2)
CHLORIDE SERPL-SCNC: 103 MMOL/L (ref 96–108)
CO2 SERPL-SCNC: 32 MMOL/L (ref 21–32)
CREAT SERPL-MCNC: 1.88 MG/DL (ref 0.6–1.3)
ERYTHROCYTE [DISTWIDTH] IN BLOOD BY AUTOMATED COUNT: 14.9 % (ref 11.6–15.1)
GFR SERPL CREATININE-BSD FRML MDRD: 29 ML/MIN/1.73SQ M
GLUCOSE SERPL-MCNC: 94 MG/DL (ref 65–140)
HCT VFR BLD AUTO: 38.4 % (ref 34.8–46.1)
HGB BLD-MCNC: 11.7 G/DL (ref 11.5–15.4)
MCH RBC QN AUTO: 25.4 PG (ref 26.8–34.3)
MCHC RBC AUTO-ENTMCNC: 30.5 G/DL (ref 31.4–37.4)
MCV RBC AUTO: 83 FL (ref 82–98)
PLATELET # BLD AUTO: 132 THOUSANDS/UL (ref 149–390)
PMV BLD AUTO: 11.5 FL (ref 8.9–12.7)
POTASSIUM SERPL-SCNC: 3.8 MMOL/L (ref 3.5–5.3)
QRS AXIS: -79 DEGREES
QRSD INTERVAL: 194 MS
QT INTERVAL: 510 MS
QTC INTERVAL: 550 MS
RBC # BLD AUTO: 4.61 MILLION/UL (ref 3.81–5.12)
SODIUM SERPL-SCNC: 141 MMOL/L (ref 135–147)
T WAVE AXIS: 96 DEGREES
VENTRICULAR RATE: 70 BPM
WBC # BLD AUTO: 3.67 THOUSAND/UL (ref 4.31–10.16)

## 2023-07-24 PROCEDURE — 80048 BASIC METABOLIC PNL TOTAL CA: CPT

## 2023-07-24 PROCEDURE — 85027 COMPLETE CBC AUTOMATED: CPT

## 2023-07-24 PROCEDURE — 93970 EXTREMITY STUDY: CPT | Performed by: SURGERY

## 2023-07-24 PROCEDURE — 97165 OT EVAL LOW COMPLEX 30 MIN: CPT

## 2023-07-24 PROCEDURE — 93970 EXTREMITY STUDY: CPT

## 2023-07-24 PROCEDURE — 93010 ELECTROCARDIOGRAM REPORT: CPT | Performed by: INTERNAL MEDICINE

## 2023-07-24 PROCEDURE — 97162 PT EVAL MOD COMPLEX 30 MIN: CPT

## 2023-07-24 PROCEDURE — 99232 SBSQ HOSP IP/OBS MODERATE 35: CPT | Performed by: INTERNAL MEDICINE

## 2023-07-24 RX ADMIN — PANTOPRAZOLE SODIUM 40 MG: 40 TABLET, DELAYED RELEASE ORAL at 09:42

## 2023-07-24 RX ADMIN — ACETAMINOPHEN 650 MG: 325 TABLET, FILM COATED ORAL at 16:19

## 2023-07-24 RX ADMIN — AMLODIPINE BESYLATE 5 MG: 5 TABLET ORAL at 09:42

## 2023-07-24 RX ADMIN — BUMETANIDE 2 MG: 0.25 INJECTION INTRAMUSCULAR; INTRAVENOUS at 16:20

## 2023-07-24 RX ADMIN — RIVAROXABAN 15 MG: 15 TABLET, FILM COATED ORAL at 16:19

## 2023-07-24 RX ADMIN — BUMETANIDE 2 MG: 0.25 INJECTION INTRAMUSCULAR; INTRAVENOUS at 09:42

## 2023-07-24 RX ADMIN — METOPROLOL SUCCINATE 50 MG: 50 TABLET, EXTENDED RELEASE ORAL at 09:42

## 2023-07-24 RX ADMIN — NICOTINE 1 PATCH: 14 PATCH, EXTENDED RELEASE TRANSDERMAL at 09:52

## 2023-07-24 NOTE — ASSESSMENT & PLAN NOTE
Wt Readings from Last 3 Encounters:   07/24/23 110 kg (243 lb 6.2 oz)   07/21/23 110 kg (243 lb)   07/17/23 109 kg (239 lb 6.4 oz)     Assessment:   · Presented with SOB with exertion, Orthopnea, bilateral lower extremity edema and pain   · Patient was seen by Cardiology outpatient one day prior to her admission; they increased her Lasix to 80 mg BID from 40 mg once daily and started her on Potassium 20 mg tablet. • She was admitted on 7-16 due to chest pain and concern of fluid overload, given IV Lasix 40 mg x 1 and discharged. • Weight decreased from 244 Ibs on admission to standing weight 238 Ibs   • She uses cocaine, last reported in cardiology note on 3/23. Occasional Marijuana. Smoker.    •    • Troponin 57<58  • Last Echo on 9/52/08: EF 65 % Systolic function is normal. Wall motion is normal. Diastolic function is normal  • EKG at baseline     Plan:  · Per Cardiology: Continue IV Bumex 2 mg BID, Discharge on torsemide   · Monitor daily I's/O  · Monitor daily standing weights  · Continue low-salt diet and fluid restriction
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
## 2023-07-24 NOTE — ASSESSMENT & PLAN NOTE
Assessment:   • Stable Blood pressures during admission     Plan:  • Continue Amlodipine 5 mg daily, Metoprolol 50 mg daily, Cardura 2 mg daily at home   • Start Torsemide 60 mg tablet daily and stop Lasix 40 mg

## 2023-07-24 NOTE — PLAN OF CARE
Problem: PAIN - ADULT  Goal: Verbalizes/displays adequate comfort level or baseline comfort level  Description: Interventions:  - Encourage patient to monitor pain and request assistance  - Assess pain using appropriate pain scale  - Administer analgesics based on type and severity of pain and evaluate response  - Implement non-pharmacological measures as appropriate and evaluate response  - Consider cultural and social influences on pain and pain management  - Notify physician/advanced practitioner if interventions unsuccessful or patient reports new pain  Outcome: Progressing     Problem: INFECTION - ADULT  Goal: Absence or prevention of progression during hospitalization  Description: INTERVENTIONS:  - Assess and monitor for signs and symptoms of infection  - Monitor lab/diagnostic results  - Monitor all insertion sites, i.e. indwelling lines, tubes, and drains  - Monitor endotracheal if appropriate and nasal secretions for changes in amount and color  - [place] appropriate cooling/warming therapies per order  - Administer medications as ordered  - Instruct and encourage patient and family to use good hand hygiene technique  - Identify and instruct in appropriate isolation precautions for identified infection/condition  Outcome: Progressing  Goal: Absence of fever/infection during neutropenic period  Description: INTERVENTIONS:  - Monitor WBC    Outcome: Progressing     Problem: SAFETY ADULT  Goal: Patient will remain free of falls  Description: INTERVENTIONS:  - Educate patient/family on patient safety including physical limitations  - Instruct patient to call for assistance with activity   - Consult OT/PT to assist with strengthening/mobility   - Keep Call bell within reach  - Keep bed low and locked with side rails adjusted as appropriate  - Keep care items and personal belongings within reach  - Initiate and maintain comfort rounds  - Make Fall Risk Sign visible to staff  - Offer Toileting every  Hours, in advance of need  - Initiate/Maintain alarm  - Obtain necessary fall risk management equipment:   - Apply yellow socks and bracelet for high fall risk patients  - Consider moving patient to room near nurses station  Outcome: Progressing  Goal: Maintain or return to baseline ADL function  Description: INTERVENTIONS:  -  Assess patient's ability to carry out ADLs; assess patient's baseline for ADL function and identify physical deficits which impact ability to perform ADLs (bathing, care of mouth/teeth, toileting, grooming, dressing, etc.)  - Assess/evaluate cause of self-care deficits   - Assess range of motion  - Assess patient's mobility; develop plan if impaired  - Assess patient's need for assistive devices and provide as appropriate  - Encourage maximum independence but intervene and supervise when necessary  - Involve family in performance of ADLs  - Assess for home care needs following discharge   - Consider OT consult to assist with ADL evaluation and planning for discharge  - Provide patient education as appropriate  Outcome: Progressing  Goal: Maintains/Returns to pre admission functional level  Description: INTERVENTIONS:  - Perform BMAT or MOVE assessment daily.   - Set and communicate daily mobility goal to care team and patient/family/caregiver. - Collaborate with rehabilitation services on mobility goals if consulted  - Perform Range of Motion  times a day. - Reposition patient every  hours.   - Dangle patient  times a day  - Stand patient  times a day  - Ambulate patient  times a day  - Out of bed to chair  times a day   - Out of bed for meals  times a day  - Out of bed for toileting  - Record patient progress and toleration of activity level   Outcome: Progressing     Problem: DISCHARGE PLANNING  Goal: Discharge to home or other facility with appropriate resources  Description: INTERVENTIONS:  - Identify barriers to discharge w/patient and caregiver  - Arrange for needed discharge resources and transportation as appropriate  - Identify discharge learning needs (meds, wound care, etc.)  - Arrange for interpretive services to assist at discharge as needed  - Refer to Case Management Department for coordinating discharge planning if the patient needs post-hospital services based on physician/advanced practitioner order or complex needs related to functional status, cognitive ability, or social support system  Outcome: Progressing     Problem: Knowledge Deficit  Goal: Patient/family/caregiver demonstrates understanding of disease process, treatment plan, medications, and discharge instructions  Description: Complete learning assessment and assess knowledge base.   Interventions:  - Provide teaching at level of understanding  - Provide teaching via preferred learning methods  Outcome: Progressing

## 2023-07-24 NOTE — ASSESSMENT & PLAN NOTE
Assessment:  · Smokes currently 1/2 pack/ day.  She has been a smoker for 43 years     Plan:  · Smoking cessation was recommended

## 2023-07-24 NOTE — ASSESSMENT & PLAN NOTE
Assessment:  · Potassium 3.3     Plan:  · Repleted Potassium with 2 doses of IV potassium 20 mg  · Cardiology outpatient recommended Potassium 40 mg tablet, she refuses to take her Potassium pill because of its size

## 2023-07-24 NOTE — ASSESSMENT & PLAN NOTE
Lab Results   Component Value Date    EGFR 29 07/24/2023    EGFR 25 07/23/2023    EGFR 22 07/22/2023    CREATININE 1.88 (H) 07/24/2023    CREATININE 2.10 (H) 07/23/2023    CREATININE 2.36 (H) 07/22/2023     Assessment & Plan:   · CKD with creatinine baseline 1.6-2.2. Slightly elevated creatinine 2.36. Suspected in the setting of CHF exacerbation. · Inaccurate I's & O's but patient states she is urinating normally now compared to when she was admitted.    · Monitor I's & O's   · Monitor standing weights  · Avoid nephrotoxic agents and hypoperfusion
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
## 2023-07-24 NOTE — PROGRESS NOTES
Heart Failure/ Pulmonary Hypertension Progress Note - Yi Keys 62 y.o. female MRN: [id number]    Unit/Bed#: S -01 Encounter: [id number]      Assessment:    Principal Problem:    Acute on chronic diastolic CHF (congestive heart failure) (HCC)  Active Problems:    Tobacco abuse    Elevated troponin    Paroxysmal A-fib (HCC)    Chronic kidney disease stage 3     Essential hypertension    Anemia    Bilateral lower extremity edema    Thrombocytopenia (HCC)    Hypokalemia    Objective: Ins/outs: not accurate w/ bumex 2 mg IV and 1 mg IV  Weight:  239 lbs on 7/23- standing  MAPs: 80 mmHg    # Acute on Chronic HFrEF, now recovered- HFpEF; LVEF 65%; LVIDd 3.6 cm; NYHA III; ACC/AHA Stage C              Etiology: nonischemic; tachy-mediated in 05/2021 (LVEF 30%) and again in 06/2022 (LVEF 40-45%) in setting of rapid Aflutter. HFpEF with LVH on echo and EKG    Weight:   BNP 7/22/23: 272    Studies- personally reviewed by [facility] 01/07/2019: no obstructive CAD. TTE 04/18/2019: LVEF 65%. TTE 09/18/2020: LVEF 65%. TTE 05/25/2021: LVEF 30%. LVIDd 3.7 cm. Reduced RVSF. Trace MR and TR. TTE 01/31/2022: LVEF 55%. LVIDd 4.8 cm. Grade 2 DD. Mildly reduced RVSF. TTE (limited) 06/19/2022: LVEF 40-45%. LVIDd 4.1 cm.   TTE (limited) 07/28/2022: LVEF 55%. Normal RV. TTE 04/05/2023: LVEF 65%. LVIDd 3.6 cm. Normal RV. Mild TR.   TTE (limited) 05/12/2023: LVEF 65%. Normal RV.                    Pharmacotherapies / Neurohormonal Blockade:  --Beta Blocker: metoprolol succinate 50 mg daily. --ARNi / ACEi / ARB: No, CKD precludes. --Aldosterone Antagonist: No, CKD precludes. --SGLT2 Inhibitor: No, CKD precludes. --Diuretic: Lasix 40 mg daily - increased to 80 mg BID and started potassium solution 15 mL (20 mEq) BID on 7/21     Sudden Cardiac Death Risk Reduction:  --Medtronic dual chamber ICD in situ since 07/2016. --Interrogation from  06/19/2023: AP 0%.  98%. Lead parameters WNL. AF burden 41.1%.  OptiVol WNL. Normal device function.      Electrical Resynchronization:  --Candidacy for BiV device: RBBB with  ms.      Atrial flutter / atrial fibrillation               [id number] = 3 (sex, HF, HTN). Anticoagulation on Xarelto. S/p unspecified ablation [facility] 2015. S/p Afib ablation with PVI in 05/2020. S/p atypical flutter and micro-reentrant atrial tachycardia ablation in 06/2022. S/p AV node ablation with ICD reprogramming on 05/10/2023. Rate control: as above. Rhythm control: None.     Hypertension              BP of 110/80 mmHg in office today. Continues on amlodipine 5 mg daily, doxazosin 2 mg qHS, and medications as above.      Chronic kidney disease, stage IV              Baseline creatinine of 1.7-2.1. Most recent BMP from 07/17/2023: sodium 139; potassium 3.7; BUN 22; creatinine 2.11; eGFR 25. Follows with Dr. Doretha Garrett as outpatient.     History of monomorphic ventricular tachycardia: noted on outpatient loop recorder; s/p secondary prevention ICD in 07/2016. Obstructive sleep apnea  Tobacco abuse: currently smoking 0.5 ppd. History of cocaine use: last used in 03/2023. Marijuana use: smoking "1 drag of a joint" at night to help her sleep.      Plan:  Continue IV Bumex 2 mg BID  Eventual change to torsemide on DC  MAPs controlled    Review of Systems   Constitutional: Negative for activity change, appetite change, fatigue and unexpected weight change. HENT: Negative for congestion and nosebleeds. Eyes: Negative. Respiratory: Negative for cough, chest tightness and shortness of breath. Cardiovascular: Negative for chest pain, palpitations and leg swelling. Gastrointestinal: Negative for abdominal distention. Endocrine: Negative. Genitourinary: Negative. Musculoskeletal: Negative. Skin: Negative.     Neurological: Negative for dizziness, syncope and weakness. Hematological: Negative. Psychiatric/Behavioral: Negative. [employer] (day, reason): Montiel catheter (day, reason):    Vitals: Blood pressure 113/65, pulse 70, temperature 98.1 °F (36.7 °C), resp. rate 15, weight 110 kg (243 lb 6.2 oz), SpO2 97 %, not currently breastfeeding., Body mass index is 38.12 kg/m²., I/O last 3 completed shifts: In: 240 [P.O.:240]  Out: 1425 [Urine:1425]  No intake/output data recorded. Wt Readings from Last 3 Encounters:   07/24/23 110 kg (243 lb 6.2 oz)   07/21/23 110 kg (243 lb)   07/17/23 109 kg (239 lb 6.4 oz)       Intake/Output Summary (Last 24 hours) at 7/24/2023 8624  Last data filed at 7/23/2023 2201  Gross per 24 hour   Intake --   Output 1425 ml   Net -1425 ml     I/O last 3 completed shifts: In: 240 [P.O.:240]  Out: 3539 [Urine:1425]    No significant arrhythmias seen on telemetry review.        Physical Exam:  Vitals:    07/23/23 2102 07/23/23 2104 07/23/23 2233 07/24/23 0600   BP: 117/56 117/56 113/65    Pulse:       Resp:       Temp:   98.1 °F (36.7 °C)    TempSrc:       SpO2:       Weight:    110 kg (243 lb 6.2 oz)       GEN: Juanpablo Medrano appears well, alert and oriented x 3, pleasant and cooperative   HEENT: pupils equal, round, and reactive to light; extraocular muscles intact  NECK: supple, no carotid bruits   HEART: regular rhythm, normal S1 and S2, no murmurs, clicks, gallops or rubs, JVP is    LUNGS: clear to auscultation bilaterally; no wheezes, rales, or rhonchi   ABDOMEN: normal bowel sounds, soft, no tenderness, no distention  EXTREMITIES: peripheral pulses normal; no clubbing, cyanosis, or edema  NEURO: no focal findings   SKIN: normal without suspicious lesions on exposed skin      Current Facility-Administered Medications:   •  acetaminophen (TYLENOL) tablet 650 mg, 650 mg, Oral, Q6H PRN, Patricia Aurora, MD, 650 mg at 07/23/23 2101  •  amLODIPine (NORVASC) tablet 5 mg, 5 mg, Oral, Daily, Rusty Rivera MD, 5 mg at 07/23/23 0809  •  bumetanide (BUMEX) injection 2 mg, 2 mg, Intravenous, BID (diuretic), [address], CRNP, 2 mg at 07/23/23 1507  •  cyclobenzaprine (FLEXERIL) tablet 5 mg, 5 mg, Oral, HS, Giuseppe Ferreira MD, 5 mg at 07/23/23 2102  •  doxazosin (CARDURA) tablet 2 mg, 2 mg, Oral, HS, Giuseppe Ferreira MD, 2 mg at 07/23/23 2102  •  metoprolol succinate (TOPROL-XL) 24 hr tablet 50 mg, 50 mg, Oral, Daily, Giuseppe Ferreira MD, 50 mg at 07/23/23 0809  •  nicotine (NICODERM CQ) 14 mg/24hr TD 24 hr patch 1 patch, 1 patch, Transdermal, Daily, Giuseppe Ferreira MD, 1 patch at 07/23/23 0808  •  pantoprazole (PROTONIX) EC tablet 40 mg, 40 mg, Oral, Daily, Giuseppe Ferreira MD, 40 mg at 07/23/23 0809  •  potassium chloride (K-DUR,KLOR-CON) CR tablet 40 mEq, 40 mEq, Oral, Once, Lisa Saucedo DO  •  rivaroxaban (XARELTO) tablet 15 mg, 15 mg, Oral, Daily With Mery Wong MD, 15 mg at 07/23/23 1811      Labs & Results:        Results from last 7 days   Lab Units 07/24/23  0436 07/23/23  0506 07/22/23  1343   WBC Thousand/uL 3.67* 3.42* 6.67   HEMOGLOBIN g/dL 11.7 10.4* 11.0*   HEMATOCRIT % 38.4 34.5* 36.2   PLATELETS Thousands/uL 132* 117* 163         Results from last 7 days   Lab Units 07/24/23  0436 07/23/23  0506 07/22/23  1343   POTASSIUM mmol/L 3.8 3.3* 3.7   CHLORIDE mmol/L 103 103 102   CO2 mmol/L 32 28 28   BUN mg/dL 24 26* 25   CREATININE mg/dL 1.88* 2.10* 2.36*   CALCIUM mg/dL 9.1 8.7 9.2   ALK PHOS U/L  --   --  66   ALT U/L  --   --  18   AST U/L  --   --  25           Counseling / Coordination of Care  Total floor / unit time spent today 25 minutes. Greater than 50% of total time was spent with the patient and / or family counseling and / or coordination of care. A description of the counseling / coordination of care: 15. Thank you for the opportunity to participate in the care of this patient.   BRONSON ALVAREZ [address] [facility] PULMONARY HYPERTENSION  MEDICAL DIRECTOR OF LVAD [address] [place] [address]

## 2023-07-24 NOTE — ASSESSMENT & PLAN NOTE
Assessment:  · Resolved   · Potassium on discharge 4.3  Plan:  · Start taking potassium oral solution 15 ml twice daily at home   · Follow up with Cardiology outpatient

## 2023-07-24 NOTE — UTILIZATION REVIEW
NOTIFICATION OF INPATIENT ADMISSION   AUTHORIZATION REQUEST   SERVICING FACILITY:   [address] [place] [address]  Tax ID: [id number]  NPI: [id number]   ATTENDING PROVIDER:  Attending Name and NPI#: April Huntley [place] [[id number]]  Address: [address] [place] [address]  Phone: [phone]     ADMISSION INFORMATION:  Place of Service: Inpatient [address]  Place of Service Code: 21  Inpatient Admission Date/Time: 7/22/23  4:56 PM  Discharge Date/Time: No discharge date for patient encounter. Admitting Diagnosis Code/Description:  Cellulitis [L03.90]  Leg swelling [M79.89]  CHF exacerbation ([address]) [I50.9]  Bilateral lower extremity edema [R60.0]     UTILIZATION REVIEW CONTACT:  Meghan Arreola, Utilization   Network Utilization Review Department  Phone: [phone]  Fax: [phone]  Email: [email or web address]  Contact for approvals/pending authorizations, clinical reviews, and discharge. PHYSICIAN ADVISORY SERVICES:  Medical Necessity Denial & Lonm-dw-Tflc Review  Phone: [phone]  Fax: [phone]  Email: [email or web address]

## 2023-07-24 NOTE — ASSESSMENT & PLAN NOTE
Wt Readings from Last 3 Encounters:   07/25/23 107 kg (235 lb 9.6 oz)   07/21/23 110 kg (243 lb)   07/17/23 109 kg (239 lb 6.4 oz)     Assessment:   · Presented with SOB with exertion, Orthopnea, bilateral lower extremity edema and pain   • She was admitted on 7-16 due to chest pain and concern of fluid overload, given IV Lasix 40 mg x 1 and discharged. • Weight decreased from 244 Ibs on admission to standing weight 235 Ibs on discharge  • She uses cocaine, last reported in cardiology note on 3/23. Occasional Marijuana. Smoker.    • Last Echo on 1/30/84: EF 65 % Systolic function is normal. Wall motion is normal. Diastolic function is normal    Plan:  · Per Cardiology: start Torsemide 60 mg tablet daily at home  · Start potassium solution 15 ml twice daily at home  · Follow up with Cardiology outpatient

## 2023-07-24 NOTE — ASSESSMENT & PLAN NOTE
Assessment & Plan:  · Patient's Platelet improved today from 117 to 132  · WBC is 3.67  · Follow up CBC

## 2023-07-24 NOTE — ASSESSMENT & PLAN NOTE
Lab Results   Component Value Date    EGFR 27 07/25/2023    EGFR 29 07/24/2023    EGFR 25 07/23/2023    CREATININE 1.99 (H) 07/25/2023    CREATININE 1.88 (H) 07/24/2023    CREATININE 2.10 (H) 07/23/2023     Assessment & Plan:   · CKD with creatinine baseline 1.6-2.2.    · Creatinine on discharge 1.99  · Inaccurate I's & O's but patient states she is urinating normally now compared to when she was admitted

## 2023-07-24 NOTE — ASSESSMENT & PLAN NOTE
Assessment:  · Patient's edema resolved  • Denies any feet pain or tenderness on discharge      Plan:  · Per OT/ PT: Per OT: no rehabilitation needed
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
## 2023-07-25 VITALS
RESPIRATION RATE: 17 BRPM | DIASTOLIC BLOOD PRESSURE: 92 MMHG | BODY MASS INDEX: 36.9 KG/M2 | SYSTOLIC BLOOD PRESSURE: 139 MMHG | WEIGHT: 235.6 LBS | TEMPERATURE: 97.8 F | HEART RATE: 74 BPM | OXYGEN SATURATION: 93 %

## 2023-07-25 PROBLEM — D64.9 ANEMIA: Status: RESOLVED | Noted: 2023-01-14 | Resolved: 2023-07-25

## 2023-07-25 PROBLEM — E87.6 HYPOKALEMIA: Status: RESOLVED | Noted: 2023-07-23 | Resolved: 2023-07-25

## 2023-07-25 LAB
ANION GAP SERPL CALCULATED.3IONS-SCNC: 4 MMOL/L
BUN SERPL-MCNC: 30 MG/DL (ref 5–25)
CALCIUM SERPL-MCNC: 9.6 MG/DL (ref 8.4–10.2)
CHLORIDE SERPL-SCNC: 101 MMOL/L (ref 96–108)
CO2 SERPL-SCNC: 34 MMOL/L (ref 21–32)
CREAT SERPL-MCNC: 1.99 MG/DL (ref 0.6–1.3)
GFR SERPL CREATININE-BSD FRML MDRD: 27 ML/MIN/1.73SQ M
GLUCOSE SERPL-MCNC: 110 MG/DL (ref 65–140)
MAGNESIUM SERPL-MCNC: 2.2 MG/DL (ref 1.9–2.7)
POTASSIUM SERPL-SCNC: 4.3 MMOL/L (ref 3.5–5.3)
SODIUM SERPL-SCNC: 139 MMOL/L (ref 135–147)

## 2023-07-25 PROCEDURE — 99239 HOSP IP/OBS DSCHRG MGMT >30: CPT | Performed by: HOSPITALIST

## 2023-07-25 PROCEDURE — 99232 SBSQ HOSP IP/OBS MODERATE 35: CPT | Performed by: INTERNAL MEDICINE

## 2023-07-25 PROCEDURE — 80048 BASIC METABOLIC PNL TOTAL CA: CPT

## 2023-07-25 PROCEDURE — 83735 ASSAY OF MAGNESIUM: CPT

## 2023-07-25 RX ORDER — TORSEMIDE 20 MG/1
60 TABLET ORAL DAILY
Status: DISCONTINUED | OUTPATIENT
Start: 2023-07-26 | End: 2023-07-25 | Stop reason: HOSPADM

## 2023-07-25 RX ADMIN — AMLODIPINE BESYLATE 5 MG: 5 TABLET ORAL at 08:01

## 2023-07-25 RX ADMIN — METOPROLOL SUCCINATE 50 MG: 50 TABLET, EXTENDED RELEASE ORAL at 08:01

## 2023-07-25 RX ADMIN — NICOTINE 1 PATCH: 14 PATCH, EXTENDED RELEASE TRANSDERMAL at 08:00

## 2023-07-25 RX ADMIN — BUMETANIDE 2 MG: 0.25 INJECTION INTRAMUSCULAR; INTRAVENOUS at 08:01

## 2023-07-25 RX ADMIN — PANTOPRAZOLE SODIUM 40 MG: 40 TABLET, DELAYED RELEASE ORAL at 08:02

## 2023-07-25 NOTE — DISCHARGE SUMMARY
[facility]  Discharge- Gumaro Grewal 1965, 62 y.o. female MRN: [id number]  Unit/Bed#: S -01 Encounter: [id number]  Primary Care Provider: Janusz Fallon MD   Date and time admitted to hospital: 7/22/2023  1:23 PM    * Acute on chronic diastolic CHF (congestive heart failure) (HCC)  Assessment & Plan  Wt Readings from Last 3 Encounters:   07/25/23 107 kg (235 lb 9.6 oz)   07/21/23 110 kg (243 lb)   07/17/23 109 kg (239 lb 6.4 oz)     Assessment:   · Presented with SOB with exertion, Orthopnea, bilateral lower extremity edema and pain   • She was admitted on 7-16 due to chest pain and concern of fluid overload, given IV Lasix 40 mg x 1 and discharged. • Weight decreased from 244 Ibs on admission to standing weight 235 Ibs on discharge  • She uses cocaine, last reported in cardiology note on 3/23. Occasional Marijuana. Smoker.    • Last Echo on 1/34/76: EF 65 % Systolic function is normal. Wall motion is normal. Diastolic function is normal    Plan:  · Per Cardiology: start Torsemide 60 mg tablet daily at home  · Start potassium solution 15 ml twice daily at home  · Follow up with Cardiology outpatient     Bilateral lower extremity edema  Assessment & Plan  Assessment:  · Patient's edema resolved  • Denies any feet pain or tenderness on discharge      Plan:  · Per OT/ PT: Per OT: no rehabilitation needed    Thrombocytopenia ([address])  Assessment & Plan  Assessment & Plan:  · Patient's Platelet is 832 on discharge   · Follow up with PCP     Essential hypertension  Assessment & Plan  Assessment:   • Stable Blood pressures during admission     Plan:  • Continue Amlodipine 5 mg daily, Metoprolol 50 mg daily, Cardura 2 mg daily at home   • Start Torsemide 60 mg tablet daily and stop Lasix 40 mg        Chronic kidney disease stage 3   Assessment & Plan  Lab Results   Component Value Date    EGFR 27 07/25/2023    EGFR 29 07/24/2023    EGFR 25 07/23/2023    CREATININE 1.99 (H) 07/25/2023 CREATININE 1.88 (H) 07/24/2023    CREATININE 2.10 (H) 07/23/2023     Assessment & Plan:   · CKD with creatinine baseline 1.6-2.2. · Creatinine on discharge 1.99  · Inaccurate I's & O's but patient states she is urinating normally now compared to when she was admitted          Paroxysmal [facility])  Assessment & Plan  Assessment & Plan:  · S/p AVJ ablation with ventricle pacemaker. Paced rhythm @70. · Xarelto 15 mg daily. Elevated troponin  Assessment & Plan  · No intervention required    Tobacco abuse  Assessment & Plan  Assessment:  · Smokes currently 1/2 pack/ day. She has been a smoker for 43 years     Plan:  · Smoking cessation was recommended    Hypokalemia-resolved as of 7/25/2023  Assessment & Plan  Assessment:  · Resolved   · Potassium on discharge 4.3  Plan:  · Start taking potassium oral solution 15 ml twice daily at home   · Follow up with Cardiology outpatient    Anemia-resolved as of 7/25/2023  Assessment & Plan  Assessment & Plan:  · Resolved   · Hgb 11.7 on discharge  · Follow up with PCP    Medical Problems     Resolved Problems  Date Reviewed: 7/24/2023          Resolved    Anemia 7/25/2023     Resolved by  Sharmin Brown MD    Hypokalemia 7/25/2023     Resolved by  Sharmin Brown MD        Discharging Resident: Sharmin Brown MD  Discharging Attending: Gurwinder Aguilar MD  PCP: Sly Centeno MD  Admission Date:   Admission Orders (From admission, onward)     Ordered        07/22/23 1656  INPATIENT ADMISSION  Once                      Discharge Date: 07/25/23    Consultations During Hospital Stay:  · Cardiology     Procedures Performed:   · None    Significant Findings / Test Results:   · Bilateral lower extremity edema  · Hypokalemia    Incidental Findings:   · Thrombocytopenia    · I reviewed the above mentioned incidental findings with the patient and/or family and they expressed understanding. Test Results Pending at Discharge (will require follow up):   · None     Outpatient Tests Requested:  · None    Complications:  None    Reason for Admission: Bilateral lower extremity edema and shortness of breath     Hospital Course:   Jason Alvarez is a 62 y.o. female patient who originally presented to the hospital on 7/22/2023 due to bilateral lower extremity edema, shortness of breath on exertion, and chest tightness. Upon evaluation, she was found to be volume overloaded and having an acute exacerbation on chronic congestive heart failure. We started her on IV Bumex 1 mg once daily. Cardiology were consulted and increased her IV Bumex gradually to 1 mg twice daily and then to 2 mg twice daily. She was also given potassium given her low potassium and admission. Patient improved clinically. She is being discharged stable denying any chest pain or shortness of breath. Resolved lower extremity edema. She should start taking torsemide 60 mg daily and potassium solution 15 ml twice daily. She should follow-up with cardiology and PCP. Please see above list of diagnoses and related plan for additional information. Condition at Discharge: stable    Discharge Day Visit / Exam:   Subjective: Patient denied any chest pain or shortness of breath. She denied lower extremity pain or tenderness. She reported that she has been urinating normally. She is ambulating independently. Vitals: Blood Pressure: 139/92 (07/25/23 0741)  Pulse: 74 (07/25/23 0741)  Temperature: 97.8 °F (36.6 °C) (07/25/23 0741)  Temp Source: Oral (07/25/23 0741)  Respirations: 17 (07/25/23 0741)  Weight - Scale: 107 kg (235 lb 9.6 oz) (07/25/23 0556)  SpO2: 93 % (07/24/23 1426)  Exam:   Physical Exam  Constitutional:       Appearance: Normal appearance. Cardiovascular:      Rate and Rhythm: Normal rate and regular rhythm. Pulses: Normal pulses. Heart sounds: Normal heart sounds. Pulmonary:      Effort: Pulmonary effort is normal.      Breath sounds: Normal breath sounds.    Musculoskeletal:      Right lower leg: No edema. Left lower leg: No edema. Feet:      Comments: No feet tenderness and examination today  Neurological:      Mental Status: She is alert. Discussion with Family: Patient declined call to . Discharge instructions/Information to patient and family:   See after visit summary for information provided to patient and family. Provisions for Follow-Up Care:  See after visit summary for information related to follow-up care and any pertinent home health orders. Disposition:   Home    Planned Readmission: Not at this time    Discharge Medications:  See after visit summary for reconciled discharge medications provided to patient and/or family.       **Please Note: This note may have been constructed using a voice recognition system**

## 2023-07-25 NOTE — DISCHARGE INSTR - AVS FIRST PAGE
Dear Juanpablo Medrano,     It was our pleasure to care for you here at [facility]. It is our hope that we were always able to exceed the expected standards for your care during your stay. You were hospitalized due to lower legs swelling and shortness of breath. You were cared for on the forth floor by Alla Antoine MD under the service of Fozia Padilla MD with the [facility] who covers for your primary care physician (PCP), Geeta Bang MD, while you were hospitalized. If you have any questions or concerns related to this hospitalization, you may contact us at [phone]. For follow up as well as any medication refills, we recommend that you follow up with your primary care physician. A registered nurse will reach out to you by phone within a few days after your discharge to answer any additional questions that you may have after going home. However, at this time we provide for you here, the most important instructions / recommendations at discharge:     Notable Medication Adjustments -   START taking Torsemide 60 mg tablet once daily  Continue taking Potassium oral solution 15 ml two times daily  STOP taking Lasix 40 mg tablet  Testing Required after Discharge -   None  ** Please contact your PCP to request testing orders for any of the testing recommended here **  Important follow up information -   Follow up with Cardiology within 1 week after discharge  Follow up with your PCP within 1 week after discharge   Other Instructions -   If you experience any chest pain or shortness of breath please come back to the emergency department   Please review this entire after visit summary as additional general instructions including medication list, appointments, activity, diet, any pertinent wound care, and other additional recommendations from your care team that may be provided for you.       Sincerely,     Alla Antoine MD

## 2023-07-25 NOTE — NURSING NOTE
Patient discharged home. IV removed per protocol. AVS reviewed in detail with patient. Patient agreed to follow up with Internal Medicine within one week. All questions and concerns were addressed at this time.

## 2023-07-25 NOTE — PROGRESS NOTES
Heart Failure/ Pulmonary Hypertension Progress Note - Javier Del Angel 62 y.o. female MRN: [id number]    Unit/Bed#: S -01 Encounter: [id number]      Assessment:    Principal Problem:    Acute on chronic diastolic CHF (congestive heart failure) (HCC)  Active Problems:    Tobacco abuse    Elevated troponin    Paroxysmal A-fib (HCC)    Chronic kidney disease stage 3     Essential hypertension    Anemia    Bilateral lower extremity edema    Thrombocytopenia (HCC)    Hypokalemia    Objective: Ins/outs: not accurate  Weight:  235 lbs on 7/23- standing  MAPs: 80's mmHg    Cr 1.99    # Acute on Chronic HFpEF; ACC/AHA Stage C  Etiology: nonischemic; tachy-mediated in 05/2021 (LVEF 30%) and again in 06/2022 (LVEF 40-45%) in setting of rapid Aflutter. HFpEF with LVH on echo and EKG    Weight: 235 lbs  BNP 7/22/23: 272    Studies- personally reviewed by [facility] 01/07/2019: no obstructive CAD. TTE 04/18/2019: LVEF 65%. TTE 09/18/2020: LVEF 65%. TTE 05/25/2021: LVEF 30%. LVIDd 3.7 cm. Reduced RVSF. Trace MR and TR. TTE 01/31/2022: LVEF 55%. LVIDd 4.8 cm. Grade 2 DD. Mildly reduced RVSF. TTE (limited) 06/19/2022: LVEF 40-45%. LVIDd 4.1 cm.   TTE (limited) 07/28/2022: LVEF 55%. Normal RV. TTE 04/05/2023: LVEF 65%. LVIDd 3.6 cm. Normal RV. Mild TR.   TTE (limited) 05/12/2023: LVEF 65%. Normal RV.                    Pharmacotherapies / Neurohormonal Blockade:  --Beta Blocker: metoprolol succinate 50 mg daily. --ARNi / ACEi / ARB: No, CKD precludes. --Aldosterone Antagonist: No, CKD precludes. --SGLT2 Inhibitor: No, CKD precludes. --Diuretic: Lasix 40 mg daily - increased to 80 mg BID and started potassium solution 15 mL (20 mEq) BID on 7/21     Sudden Cardiac Death Risk Reduction:  --[employer]tronic dual chamber ICD in situ since 07/2016. --Interrogation from  06/19/2023: AP 0%.  98%. Lead parameters WNL. AF burden 41.1%. OptiVol WNL.  Normal device function.      Electrical Resynchronization:  --Candidacy for BiV device: RBBB with  ms.      Atrial flutter / atrial fibrillation               [id number] = 3 (sex, HF, HTN). Anticoagulation on Xarelto. S/p unspecified ablation at [facility] in 2015. S/p Afib ablation with PVI in 05/2020. S/p atypical flutter and micro-reentrant atrial tachycardia ablation in 06/2022. S/p AV node ablation with ICD reprogramming on 05/10/2023. Rate control: as above. Rhythm control: None.     Hypertension              BP of 110/80 mmHg in office today. Continues on amlodipine 5 mg daily, doxazosin 2 mg qHS, and medications as above.      Chronic kidney disease, stage IV              Baseline creatinine of 1.7-2.1. Most recent BMP from 07/17/2023: sodium 139; potassium 3.7; BUN 22; creatinine 2.11; eGFR 25. Follows with Dr. Manjula Maki as outpatient.     History of monomorphic ventricular tachycardia: noted on outpatient loop recorder; s/p secondary prevention ICD in 07/2016. Obstructive sleep apnea  Tobacco abuse: currently smoking 0.5 ppd. History of cocaine use: last used in 03/2023. Marijuana use: smoking "1 drag of a joint" at night to help her sleep.      Plan:  Change to torsemide 60 mg daily on DC  MAPs controlled  Ins/outs  Daily weights  [facility] with DC, follow up in HF clinic- I will send message to my office    Review of Systems   Constitutional: Negative for activity change, appetite change, fatigue and unexpected weight change. HENT: Negative for congestion and nosebleeds. Eyes: Negative. Respiratory: Negative for cough, chest tightness and shortness of breath. Cardiovascular: Negative for chest pain, palpitations and leg swelling. Gastrointestinal: Negative for abdominal distention. Endocrine: Negative. Genitourinary: Negative. Musculoskeletal: Negative. Skin: Negative.     Neurological: Negative for dizziness, syncope and weakness. Hematological: Negative. Psychiatric/Behavioral: Negative. [employer] (day, reason): Montiel catheter (day, reason):    Vitals: Blood pressure 139/92, pulse 72, temperature (!) 97.4 °F (36.3 °C), resp. rate 18, weight 107 kg (235 lb 9.6 oz), SpO2 93 %, not currently breastfeeding., Body mass index is 36.9 kg/m²., I/O last 3 completed shifts: In: 5 [P.O.:720]  Out: 300 [Urine:300]  No intake/output data recorded. Wt Readings from Last 3 Encounters:   07/25/23 107 kg (235 lb 9.6 oz)   07/21/23 110 kg (243 lb)   07/17/23 109 kg (239 lb 6.4 oz)       Intake/Output Summary (Last 24 hours) at 7/25/2023 0755  Last data filed at 7/24/2023 1928  Gross per 24 hour   Intake 720 ml   Output --   Net 720 ml     I/O last 3 completed shifts: In: 5 [P.O.:720]  Out: 300 [Urine:300]    No significant arrhythmias seen on telemetry review.        Physical Exam:  Vitals:    07/24/23 2126 07/24/23 2304 07/25/23 0556 07/25/23 0741   BP: 130/76 111/78  139/92   BP Location: Left arm      Pulse: 72      Resp:  16  18   Temp:  98.5 °F (36.9 °C)  (!) 97.4 °F (36.3 °C)   TempSrc:       SpO2:       Weight:   107 kg (235 lb 9.6 oz)        GEN: Gumaro Grewal appears well, alert and oriented x 3, pleasant and cooperative   HEENT: pupils equal, round, and reactive to light; extraocular muscles intact  NECK: supple, no carotid bruits   HEART: regular rhythm, normal S1 and S2, no murmurs, clicks, gallops or rubs, JVP is    LUNGS: clear to auscultation bilaterally; no wheezes, rales, or rhonchi   ABDOMEN: normal bowel sounds, soft, no tenderness, no distention  EXTREMITIES: peripheral pulses normal; no clubbing, cyanosis, or edema  NEURO: no focal findings   SKIN: normal without suspicious lesions on exposed skin      Current Facility-Administered Medications:   •  acetaminophen (TYLENOL) tablet 650 mg, 650 mg, Oral, Q6H PRN, Lieutenant Uri MD, 650 mg at 07/24/23 1619  •  amLODIPine (NORVASC) tablet 5 mg, 5 mg, Oral, Daily, Corina Bhakta MD, 5 mg at 07/24/23 0033  •  bumetanide (BUMEX) injection 2 mg, 2 mg, Intravenous, BID (diuretic), CHARLOTTE Alejandra, 2 mg at 07/24/23 1620  •  cyclobenzaprine (FLEXERIL) tablet 5 mg, 5 mg, Oral, HS, Corina Bhakta MD, 5 mg at 07/23/23 2102  •  doxazosin (CARDURA) tablet 2 mg, 2 mg, Oral, HS, Corina Bhakta MD, 2 mg at 07/23/23 2102  •  metoprolol succinate (TOPROL-XL) 24 hr tablet 50 mg, 50 mg, Oral, Daily, Corina Bhakta MD, 50 mg at 07/24/23 0942  •  nicotine (NICODERM CQ) 14 mg/24hr TD 24 hr patch 1 patch, 1 patch, Transdermal, Daily, Corina Bhakta MD, 1 patch at 07/24/23 1026  •  pantoprazole (PROTONIX) EC tablet 40 mg, 40 mg, Oral, Daily, Corina Bhakta MD, 40 mg at 07/24/23 9876  •  potassium chloride (K-DUR,KLOR-CON) CR tablet 40 mEq, 40 mEq, Oral, Once, Yanet Valencia DO  •  rivaroxaban (XARELTO) tablet 15 mg, 15 mg, Oral, Daily With Mary Klein MD, 15 mg at 07/24/23 1619      Labs & Results:        Results from last 7 days   Lab Units 07/24/23  0436 07/23/23  0506 07/22/23  1343   WBC Thousand/uL 3.67* 3.42* 6.67   HEMOGLOBIN g/dL 11.7 10.4* 11.0*   HEMATOCRIT % 38.4 34.5* 36.2   PLATELETS Thousands/uL 132* 117* 163         Results from last 7 days   Lab Units 07/25/23  0633 07/24/23  0436 07/23/23  0506 07/22/23  1343   POTASSIUM mmol/L 4.3 3.8 3.3* 3.7   CHLORIDE mmol/L 101 103 103 102   CO2 mmol/L 34* 32 28 28   BUN mg/dL 30* 24 26* 25   CREATININE mg/dL 1.99* 1.88* 2.10* 2.36*   CALCIUM mg/dL 9.6 9.1 8.7 9.2   ALK PHOS U/L  --   --   --  66   ALT U/L  --   --   --  18   AST U/L  --   --   --  25           Counseling / Coordination of Care  Total floor / unit time spent today 25 minutes. Greater than 50% of total time was spent with the patient and / or family counseling and / or coordination of care. A description of the counseling / coordination of care: 15.       Thank you for the opportunity to participate in the care of this patient.   Michele Zheng PULMONARY HYPERTENSION  MEDICAL DIRECTOR OF [address] [place] [address] Street [address] [place] [address]

## 2023-07-25 NOTE — PLAN OF CARE
Problem: PAIN - ADULT  Goal: Verbalizes/displays adequate comfort level or baseline comfort level  Description: Interventions:  - Encourage patient to monitor pain and request assistance  - Assess pain using appropriate pain scale  - Administer analgesics based on type and severity of pain and evaluate response  - Implement non-pharmacological measures as appropriate and evaluate response  - Consider cultural and social influences on pain and pain management  - Notify physician/advanced practitioner if interventions unsuccessful or patient reports new pain  Outcome: Progressing     Problem: INFECTION - ADULT  Goal: Absence or prevention of progression during hospitalization  Description: INTERVENTIONS:  - Assess and monitor for signs and symptoms of infection  - Monitor lab/diagnostic results  - Monitor all insertion sites, i.e. indwelling lines, tubes, and drains  - Monitor endotracheal if appropriate and nasal secretions for changes in amount and color  - [place] appropriate cooling/warming therapies per order  - Administer medications as ordered  - Instruct and encourage patient and family to use good hand hygiene technique  - Identify and instruct in appropriate isolation precautions for identified infection/condition  Outcome: Progressing  Goal: Absence of fever/infection during neutropenic period  Description: INTERVENTIONS:  - Monitor WBC    Outcome: Progressing     Problem: SAFETY ADULT  Goal: Patient will remain free of falls  Description: INTERVENTIONS:  - Educate patient/family on patient safety including physical limitations  - Instruct patient to call for assistance with activity   - Consult OT/PT to assist with strengthening/mobility   - Keep Call bell within reach  - Keep bed low and locked with side rails adjusted as appropriate  - Keep care items and personal belongings within reach  - Initiate and maintain comfort rounds  - Make Fall Risk Sign visible to staff  - Offer Toileting every Hours, in advance of need  - Initiate/Maintain alarm  - Obtain necessary fall risk management equipment:  - Apply yellow socks and bracelet for high fall risk patients  - Consider moving patient to room near nurses station  Outcome: Progressing  Goal: Maintain or return to baseline ADL function  Description: INTERVENTIONS:  -  Assess patient's ability to carry out ADLs; assess patient's baseline for ADL function and identify physical deficits which impact ability to perform ADLs (bathing, care of mouth/teeth, toileting, grooming, dressing, etc.)  - Assess/evaluate cause of self-care deficits   - Assess range of motion  - Assess patient's mobility; develop plan if impaired  - Assess patient's need for assistive devices and provide as appropriate  - Encourage maximum independence but intervene and supervise when necessary  - Involve family in performance of ADLs  - Assess for home care needs following discharge   - Consider OT consult to assist with ADL evaluation and planning for discharge  - Provide patient education as appropriate  Outcome: Progressing  Goal: Maintains/Returns to pre admission functional level  Description: INTERVENTIONS:  - Perform BMAT or MOVE assessment daily.   - Set and communicate daily mobility goal to care team and patient/family/caregiver. - Collaborate with rehabilitation services on mobility goals if consulted  - Perform Range of Motion times a day. - Reposition patient every  hours.   - Dangle patient times a day  - Stand patient  times a day  - Ambulate patient  times a day  - Out of bed to chair times a day   - Out of bed for meals times a day  - Out of bed for toileting  - Record patient progress and toleration of activity level   Outcome: Progressing     Problem: DISCHARGE PLANNING  Goal: Discharge to home or other facility with appropriate resources  Description: INTERVENTIONS:  - Identify barriers to discharge w/patient and caregiver  - Arrange for needed discharge resources and transportation as appropriate  - Identify discharge learning needs (meds, wound care, etc.)  - Arrange for interpretive services to assist at discharge as needed  - Refer to Case Management Department for coordinating discharge planning if the patient needs post-hospital services based on physician/advanced practitioner order or complex needs related to functional status, cognitive ability, or social support system  Outcome: Progressing     Problem: Knowledge Deficit  Goal: Patient/family/caregiver demonstrates understanding of disease process, treatment plan, medications, and discharge instructions  Description: Complete learning assessment and assess knowledge base.   Interventions:  - Provide teaching at level of understanding  - Provide teaching via preferred learning methods  Outcome: Progressing

## 2023-07-26 NOTE — UTILIZATION REVIEW
NOTIFICATION OF ADMISSION DISCHARGE   This is a Notification of Discharge from [facility]. Please be advised that this patient has been discharge from our facility. Below you will find the admission and discharge date and time including the patient’s disposition. UTILIZATION REVIEW CONTACT:  Leonela Oshea MA  Utilization   Network Utilization Review Department  Phone: [phone] x carefully listen to the prompts. All voicemails are confidential.  Email: [email or web address]     ADMISSION INFORMATION  PRESENTATION DATE: 7/22/2023  1:23 PM  OBERVATION ADMISSION DATE:   INPATIENT ADMISSION DATE: 7/22/23  4:56 PM   DISCHARGE DATE: 7/25/2023 11:33 AM   DISPOSITION:Home/Self Care    IMPORTANT INFORMATION:  Send all requests for admission clinical reviews, approved or denied determinations and any other requests to dedicated fax number below belonging to the campus where the patient is receiving treatment.  List of dedicated fax numbers:  [place] DENIALS (Administrative/Medical Necessity) [phone] [facility] [phone]   [facility] [phone]   [facility] [phone] [address] [facility] [phone]   [address] [place] [address] [phone]   [facility] [phone]   [address] [place] [address] [address] [place] [address] [phone]   [facility] [phone] [address] [facility] [phone] [address] [facility] [address] [phone]

## 2023-07-27 ENCOUNTER — PATIENT OUTREACH (OUTPATIENT)
Dept: CASE MANAGEMENT | Facility: HOSPITAL | Age: 58
End: 2023-07-27

## 2023-07-27 NOTE — PROGRESS NOTES
Heart Failure Outpatient Care Coordinator Note: Chart notes reviewed and call made to patient, but unable to reach with immediate message "unable to take calls at this time". Will follow up.

## 2023-07-28 ENCOUNTER — PATIENT OUTREACH (OUTPATIENT)
Dept: CASE MANAGEMENT | Facility: HOSPITAL | Age: 58
End: 2023-07-28

## 2023-07-28 NOTE — PROGRESS NOTES
Heart Failure Outpatient Care Coordinator Note: Unable to reach. Call made - with message "sorry for any inconvenience" and call drops.

## 2023-08-08 ENCOUNTER — HOSPITAL ENCOUNTER (INPATIENT)
Facility: HOSPITAL | Age: 58
LOS: 3 days | Discharge: HOME | DRG: 194 | End: 2023-08-12
Attending: EMERGENCY MEDICINE | Admitting: INTERNAL MEDICINE
Payer: COMMERCIAL

## 2023-08-08 DIAGNOSIS — E87.6 HYPOKALEMIA: ICD-10-CM

## 2023-08-08 DIAGNOSIS — R60.0 BILATERAL LOWER EXTREMITY EDEMA: ICD-10-CM

## 2023-08-08 DIAGNOSIS — R10.32 LLQ ABDOMINAL PAIN: ICD-10-CM

## 2023-08-08 DIAGNOSIS — I50.9 ACUTE EXACERBATION OF CHF (CONGESTIVE HEART FAILURE) (HCC): Primary | ICD-10-CM

## 2023-08-08 DIAGNOSIS — D64.9 ANEMIA: ICD-10-CM

## 2023-08-08 PROCEDURE — 93005 ELECTROCARDIOGRAM TRACING: CPT

## 2023-08-08 PROCEDURE — 99285 EMERGENCY DEPT VISIT HI MDM: CPT

## 2023-08-08 PROCEDURE — NC001 PR NO CHARGE

## 2023-08-08 RX ORDER — ACETAMINOPHEN 325 MG/1
650 TABLET ORAL ONCE
Status: COMPLETED | OUTPATIENT
Start: 2023-08-08 | End: 2023-08-08

## 2023-08-08 RX ORDER — ONDANSETRON 2 MG/ML
4 INJECTION INTRAMUSCULAR; INTRAVENOUS ONCE
Status: COMPLETED | OUTPATIENT
Start: 2023-08-08 | End: 2023-08-09

## 2023-08-08 RX ADMIN — ACETAMINOPHEN 650 MG: 325 TABLET, FILM COATED ORAL at 23:51

## 2023-08-09 ENCOUNTER — APPOINTMENT (EMERGENCY)
Dept: CT IMAGING | Facility: HOSPITAL | Age: 58
DRG: 194 | End: 2023-08-09
Payer: COMMERCIAL

## 2023-08-09 ENCOUNTER — APPOINTMENT (EMERGENCY)
Dept: RADIOLOGY | Facility: HOSPITAL | Age: 58
DRG: 194 | End: 2023-08-09
Payer: COMMERCIAL

## 2023-08-09 ENCOUNTER — APPOINTMENT (INPATIENT)
Dept: NON INVASIVE DIAGNOSTICS | Facility: HOSPITAL | Age: 58
DRG: 194 | End: 2023-08-09
Payer: COMMERCIAL

## 2023-08-09 PROBLEM — N18.9 ACUTE KIDNEY INJURY SUPERIMPOSED ON CKD (HCC): Status: ACTIVE | Noted: 2023-01-14

## 2023-08-09 LAB
2HR DELTA HS TROPONIN: -3 NG/L
4HR DELTA HS TROPONIN: 1 NG/L
ALBUMIN SERPL BCP-MCNC: 3.9 G/DL (ref 3.5–5)
ALP SERPL-CCNC: 74 U/L (ref 34–104)
ALT SERPL W P-5'-P-CCNC: 14 U/L (ref 7–52)
ANION GAP SERPL CALCULATED.3IONS-SCNC: 6 MMOL/L
ANION GAP SERPL CALCULATED.3IONS-SCNC: 8 MMOL/L
AST SERPL W P-5'-P-CCNC: 18 U/L (ref 13–39)
ATRIAL RATE: 63 BPM
BACTERIA UR QL AUTO: ABNORMAL /HPF
BASOPHILS # BLD AUTO: 0.02 THOUSANDS/ÂΜL (ref 0–0.1)
BASOPHILS NFR BLD AUTO: 0 % (ref 0–1)
BILIRUB SERPL-MCNC: 0.28 MG/DL (ref 0.2–1)
BILIRUB UR QL STRIP: NEGATIVE
BNP SERPL-MCNC: 539 PG/ML (ref 0–100)
BUN SERPL-MCNC: 26 MG/DL (ref 5–25)
BUN SERPL-MCNC: 29 MG/DL (ref 5–25)
CALCIUM SERPL-MCNC: 8.2 MG/DL (ref 8.4–10.2)
CALCIUM SERPL-MCNC: 8.7 MG/DL (ref 8.4–10.2)
CARDIAC TROPONIN I PNL SERPL HS: 82 NG/L
CARDIAC TROPONIN I PNL SERPL HS: 85 NG/L
CARDIAC TROPONIN I PNL SERPL HS: 86 NG/L
CHLORIDE SERPL-SCNC: 102 MMOL/L (ref 96–108)
CHLORIDE SERPL-SCNC: 103 MMOL/L (ref 96–108)
CLARITY UR: ABNORMAL
CO2 SERPL-SCNC: 28 MMOL/L (ref 21–32)
CO2 SERPL-SCNC: 30 MMOL/L (ref 21–32)
COLOR UR: ABNORMAL
CREAT SERPL-MCNC: 2.41 MG/DL (ref 0.6–1.3)
CREAT SERPL-MCNC: 2.61 MG/DL (ref 0.6–1.3)
EOSINOPHIL # BLD AUTO: 0.1 THOUSAND/ÂΜL (ref 0–0.61)
EOSINOPHIL NFR BLD AUTO: 2 % (ref 0–6)
ERYTHROCYTE [DISTWIDTH] IN BLOOD BY AUTOMATED COUNT: 15.1 % (ref 11.6–15.1)
GFR SERPL CREATININE-BSD FRML MDRD: 19 ML/MIN/1.73SQ M
GFR SERPL CREATININE-BSD FRML MDRD: 21 ML/MIN/1.73SQ M
GLUCOSE SERPL-MCNC: 154 MG/DL (ref 65–140)
GLUCOSE SERPL-MCNC: 96 MG/DL (ref 65–140)
GLUCOSE UR STRIP-MCNC: NEGATIVE MG/DL
HCT VFR BLD AUTO: 33.1 % (ref 34.8–46.1)
HGB BLD-MCNC: 9.9 G/DL (ref 11.5–15.4)
HGB UR QL STRIP.AUTO: NEGATIVE
HYALINE CASTS #/AREA URNS LPF: ABNORMAL /LPF
IMM GRANULOCYTES # BLD AUTO: 0.02 THOUSAND/UL (ref 0–0.2)
IMM GRANULOCYTES NFR BLD AUTO: 0 % (ref 0–2)
KETONES UR STRIP-MCNC: NEGATIVE MG/DL
LEUKOCYTE ESTERASE UR QL STRIP: ABNORMAL
LIPASE SERPL-CCNC: 81 U/L (ref 11–82)
LYMPHOCYTES # BLD AUTO: 1 THOUSANDS/ÂΜL (ref 0.6–4.47)
LYMPHOCYTES NFR BLD AUTO: 21 % (ref 14–44)
MAGNESIUM SERPL-MCNC: 2.2 MG/DL (ref 1.9–2.7)
MCH RBC QN AUTO: 25.4 PG (ref 26.8–34.3)
MCHC RBC AUTO-ENTMCNC: 29.9 G/DL (ref 31.4–37.4)
MCV RBC AUTO: 85 FL (ref 82–98)
MONOCYTES # BLD AUTO: 0.34 THOUSAND/ÂΜL (ref 0.17–1.22)
MONOCYTES NFR BLD AUTO: 7 % (ref 4–12)
MUCOUS THREADS UR QL AUTO: ABNORMAL
NEUTROPHILS # BLD AUTO: 3.34 THOUSANDS/ÂΜL (ref 1.85–7.62)
NEUTS SEG NFR BLD AUTO: 70 % (ref 43–75)
NITRITE UR QL STRIP: NEGATIVE
NON-SQ EPI CELLS URNS QL MICRO: ABNORMAL /HPF
NRBC BLD AUTO-RTO: 0 /100 WBCS
PH UR STRIP.AUTO: 5.5 [PH]
PLATELET # BLD AUTO: 146 THOUSANDS/UL (ref 149–390)
PMV BLD AUTO: 12.2 FL (ref 8.9–12.7)
POTASSIUM SERPL-SCNC: 3 MMOL/L (ref 3.5–5.3)
POTASSIUM SERPL-SCNC: 4.8 MMOL/L (ref 3.5–5.3)
PROT SERPL-MCNC: 7.1 G/DL (ref 6.4–8.4)
PROT UR STRIP-MCNC: ABNORMAL MG/DL
QRS AXIS: -79 DEGREES
QRSD INTERVAL: 200 MS
QT INTERVAL: 526 MS
QTC INTERVAL: 568 MS
RBC # BLD AUTO: 3.9 MILLION/UL (ref 3.81–5.12)
RBC #/AREA URNS AUTO: ABNORMAL /HPF
SL CV LV EF: 70
SODIUM SERPL-SCNC: 137 MMOL/L (ref 135–147)
SODIUM SERPL-SCNC: 140 MMOL/L (ref 135–147)
SP GR UR STRIP.AUTO: 1.01 (ref 1–1.03)
T WAVE AXIS: 93 DEGREES
UROBILINOGEN UR STRIP-ACNC: <2 MG/DL
VENTRICULAR RATE: 70 BPM
WBC # BLD AUTO: 4.82 THOUSAND/UL (ref 4.31–10.16)
WBC #/AREA URNS AUTO: ABNORMAL /HPF

## 2023-08-09 PROCEDURE — 93010 ELECTROCARDIOGRAM REPORT: CPT | Performed by: INTERNAL MEDICINE

## 2023-08-09 PROCEDURE — 84484 ASSAY OF TROPONIN QUANT: CPT

## 2023-08-09 PROCEDURE — 96365 THER/PROPH/DIAG IV INF INIT: CPT

## 2023-08-09 PROCEDURE — 93308 TTE F-UP OR LMTD: CPT

## 2023-08-09 PROCEDURE — 81001 URINALYSIS AUTO W/SCOPE: CPT

## 2023-08-09 PROCEDURE — 96375 TX/PRO/DX INJ NEW DRUG ADDON: CPT

## 2023-08-09 PROCEDURE — 85025 COMPLETE CBC W/AUTO DIFF WBC: CPT

## 2023-08-09 PROCEDURE — 99223 1ST HOSP IP/OBS HIGH 75: CPT | Performed by: INTERNAL MEDICINE

## 2023-08-09 PROCEDURE — 83735 ASSAY OF MAGNESIUM: CPT

## 2023-08-09 PROCEDURE — 83690 ASSAY OF LIPASE: CPT

## 2023-08-09 PROCEDURE — 83880 ASSAY OF NATRIURETIC PEPTIDE: CPT

## 2023-08-09 PROCEDURE — 80048 BASIC METABOLIC PNL TOTAL CA: CPT

## 2023-08-09 PROCEDURE — 93321 DOPPLER ECHO F-UP/LMTD STD: CPT | Performed by: INTERNAL MEDICINE

## 2023-08-09 PROCEDURE — 87086 URINE CULTURE/COLONY COUNT: CPT

## 2023-08-09 PROCEDURE — 74176 CT ABD & PELVIS W/O CONTRAST: CPT

## 2023-08-09 PROCEDURE — 71046 X-RAY EXAM CHEST 2 VIEWS: CPT

## 2023-08-09 PROCEDURE — 80053 COMPREHEN METABOLIC PANEL: CPT

## 2023-08-09 PROCEDURE — 36415 COLL VENOUS BLD VENIPUNCTURE: CPT

## 2023-08-09 PROCEDURE — G1004 CDSM NDSC: HCPCS

## 2023-08-09 PROCEDURE — 93308 TTE F-UP OR LMTD: CPT | Performed by: INTERNAL MEDICINE

## 2023-08-09 RX ORDER — HYDROMORPHONE HCL/PF 1 MG/ML
0.5 SYRINGE (ML) INJECTION ONCE AS NEEDED
Status: COMPLETED | OUTPATIENT
Start: 2023-08-09 | End: 2023-08-09

## 2023-08-09 RX ORDER — BUMETANIDE 0.25 MG/ML
2 INJECTION INTRAMUSCULAR; INTRAVENOUS 2 TIMES DAILY
Status: DISCONTINUED | OUTPATIENT
Start: 2023-08-09 | End: 2023-08-10

## 2023-08-09 RX ORDER — ACETAMINOPHEN 325 MG/1
975 TABLET ORAL EVERY 8 HOURS SCHEDULED
Status: DISCONTINUED | OUTPATIENT
Start: 2023-08-09 | End: 2023-08-12 | Stop reason: HOSPADM

## 2023-08-09 RX ORDER — HYDROMORPHONE HCL IN WATER/PF 6 MG/30 ML
0.2 PATIENT CONTROLLED ANALGESIA SYRINGE INTRAVENOUS EVERY 4 HOURS PRN
Status: COMPLETED | OUTPATIENT
Start: 2023-08-09 | End: 2023-08-09

## 2023-08-09 RX ORDER — POTASSIUM CHLORIDE 20 MEQ/1
40 TABLET, EXTENDED RELEASE ORAL DAILY
Status: DISCONTINUED | OUTPATIENT
Start: 2023-08-09 | End: 2023-08-12 | Stop reason: HOSPADM

## 2023-08-09 RX ORDER — HYDROMORPHONE HCL IN WATER/PF 6 MG/30 ML
0.2 PATIENT CONTROLLED ANALGESIA SYRINGE INTRAVENOUS EVERY 2 HOUR PRN
Status: DISCONTINUED | OUTPATIENT
Start: 2023-08-09 | End: 2023-08-10

## 2023-08-09 RX ORDER — POTASSIUM CHLORIDE 20 MEQ/1
40 TABLET, EXTENDED RELEASE ORAL ONCE
Status: DISCONTINUED | OUTPATIENT
Start: 2023-08-09 | End: 2023-08-12 | Stop reason: HOSPADM

## 2023-08-09 RX ORDER — POTASSIUM CHLORIDE 20 MEQ/1
40 TABLET, EXTENDED RELEASE ORAL ONCE
Status: COMPLETED | OUTPATIENT
Start: 2023-08-09 | End: 2023-08-09

## 2023-08-09 RX ORDER — NICOTINE 21 MG/24HR
1 PATCH, TRANSDERMAL 24 HOURS TRANSDERMAL DAILY
Status: DISCONTINUED | OUTPATIENT
Start: 2023-08-09 | End: 2023-08-12 | Stop reason: HOSPADM

## 2023-08-09 RX ORDER — DOXAZOSIN MESYLATE 1 MG/1
2 TABLET ORAL
Status: DISCONTINUED | OUTPATIENT
Start: 2023-08-09 | End: 2023-08-12 | Stop reason: HOSPADM

## 2023-08-09 RX ORDER — CYCLOBENZAPRINE HCL 10 MG
5 TABLET ORAL
Status: DISCONTINUED | OUTPATIENT
Start: 2023-08-09 | End: 2023-08-12 | Stop reason: HOSPADM

## 2023-08-09 RX ORDER — OXYCODONE HYDROCHLORIDE 5 MG/1
5 TABLET ORAL EVERY 4 HOURS PRN
Status: DISCONTINUED | OUTPATIENT
Start: 2023-08-09 | End: 2023-08-10

## 2023-08-09 RX ORDER — PANTOPRAZOLE SODIUM 40 MG/1
40 TABLET, DELAYED RELEASE ORAL DAILY
Status: DISCONTINUED | OUTPATIENT
Start: 2023-08-09 | End: 2023-08-12 | Stop reason: HOSPADM

## 2023-08-09 RX ORDER — METOPROLOL SUCCINATE 50 MG/1
50 TABLET, EXTENDED RELEASE ORAL DAILY
Status: DISCONTINUED | OUTPATIENT
Start: 2023-08-09 | End: 2023-08-12 | Stop reason: HOSPADM

## 2023-08-09 RX ORDER — AMLODIPINE BESYLATE 5 MG/1
5 TABLET ORAL DAILY
Status: DISCONTINUED | OUTPATIENT
Start: 2023-08-09 | End: 2023-08-10

## 2023-08-09 RX ADMIN — ONDANSETRON 4 MG: 2 INJECTION INTRAMUSCULAR; INTRAVENOUS at 00:27

## 2023-08-09 RX ADMIN — HYDROMORPHONE HYDROCHLORIDE 0.2 MG: 0.2 INJECTION, SOLUTION INTRAMUSCULAR; INTRAVENOUS; SUBCUTANEOUS at 12:04

## 2023-08-09 RX ADMIN — HYDROMORPHONE HYDROCHLORIDE 0.5 MG: 1 INJECTION, SOLUTION INTRAMUSCULAR; INTRAVENOUS; SUBCUTANEOUS at 00:25

## 2023-08-09 RX ADMIN — FUROSEMIDE 120 MG: 10 INJECTION, SOLUTION INTRAMUSCULAR; INTRAVENOUS at 00:32

## 2023-08-09 RX ADMIN — METOPROLOL SUCCINATE 50 MG: 50 TABLET, EXTENDED RELEASE ORAL at 09:16

## 2023-08-09 RX ADMIN — ACETAMINOPHEN 975 MG: 325 TABLET, FILM COATED ORAL at 23:05

## 2023-08-09 RX ADMIN — POTASSIUM CHLORIDE 40 MEQ: 1500 TABLET, EXTENDED RELEASE ORAL at 02:00

## 2023-08-09 RX ADMIN — AMLODIPINE BESYLATE 5 MG: 5 TABLET ORAL at 08:18

## 2023-08-09 RX ADMIN — CYCLOBENZAPRINE 5 MG: 10 TABLET, FILM COATED ORAL at 23:05

## 2023-08-09 RX ADMIN — DOXAZOSIN 2 MG: 1 TABLET ORAL at 23:05

## 2023-08-09 RX ADMIN — DICLOFENAC SODIUM 2 G: 10 GEL TOPICAL at 23:07

## 2023-08-09 RX ADMIN — PANTOPRAZOLE SODIUM 40 MG: 40 TABLET, DELAYED RELEASE ORAL at 08:18

## 2023-08-09 RX ADMIN — POTASSIUM CHLORIDE 40 MEQ: 1500 TABLET, EXTENDED RELEASE ORAL at 08:18

## 2023-08-09 RX ADMIN — NICOTINE 1 PATCH: 14 PATCH, EXTENDED RELEASE TRANSDERMAL at 08:18

## 2023-08-09 RX ADMIN — BUMETANIDE 2 MG: 0.25 INJECTION INTRAMUSCULAR; INTRAVENOUS at 18:50

## 2023-08-09 RX ADMIN — Medication 2.5 MG: at 19:50

## 2023-08-09 RX ADMIN — BUMETANIDE 2 MG: 0.25 INJECTION INTRAMUSCULAR; INTRAVENOUS at 09:17

## 2023-08-09 RX ADMIN — RIVAROXABAN 15 MG: 15 TABLET, FILM COATED ORAL at 18:50

## 2023-08-09 NOTE — ASSESSMENT & PLAN NOTE
· Noted low potassium 3 on admission. · Patient reports taking potassium solution 15 mg daily however she increased Lasix to 120 mg daily for the past 2 days.   · Potassium repleted with 40 x 2    Plan  · Started potassium 40 mg daily with diuresis

## 2023-08-09 NOTE — ASSESSMENT & PLAN NOTE
Blood Pressure: 126/66  Stable Blood pressures on admission     Plan:  • Continue Amlodipine 5 mg daily, Metoprolol 50 mg daily, Cardura 2 mg daily at home   • Started Bumex 2 mg twice daily.   • Follow cardiology recommendations

## 2023-08-09 NOTE — ASSESSMENT & PLAN NOTE
Lab Results   Component Value Date    EGFR 19 08/09/2023    EGFR 27 07/25/2023    EGFR 29 07/24/2023    CREATININE 2.61 (H) 08/09/2023    CREATININE 1.99 (H) 07/25/2023    CREATININE 1.88 (H) 07/24/2023     · Presents with DANIELA. Suspected to be in the setting of CHF exacerbation.   · Creatinine baseline 1.6-2.2.  · S/p 1 dose Lasix 120 mg in the ED  · Rest as BMP with a.m. labs  · Monitor for I/O's and daily weight  · Avoid nephrotoxic agents and hypoperfusion

## 2023-08-09 NOTE — ASSESSMENT & PLAN NOTE
Lab Results   Component Value Date    EGFR 19 08/09/2023    EGFR 27 07/25/2023    EGFR 29 07/24/2023    CREATININE 2.61 (H) 08/09/2023    CREATININE 1.99 (H) 07/25/2023    CREATININE 1.88 (H) 07/24/2023     Assessment & Plan:   • CKD with creatinine baseline 1.6-2.2.    • Creatinine on discharge 1.99  • Inaccurate I's & O's but patient states she is urinating normally now compared to when she was admitted
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
## 2023-08-09 NOTE — ASSESSMENT & PLAN NOTE
Wt Readings from Last 3 Encounters:   08/08/23 113 kg (249 lb 1.9 oz)   07/25/23 107 kg (235 lb 9.6 oz)   07/21/23 110 kg (243 lb)       Assessment:   • Presented with SOB with exertion, orthopnea, and bilateral lower extremity edema  • Recently discharged on 7/25 with weight 235 pounds. Reports increasing in weight to 249 lb. was evaluated by heart failure specialist commended Torsemide 60 mg and discharge. • Patient was taking torsemide 120 milligram for the past 2 days  • S/p 1 dose Lasix 120 mg in the ED  • She uses cocaine, last reported in cardiology note on 3/23. Occasional Marijuana. Smoker. • Last Echo on 8/48/69: EF 65 % Systolic function is normal. Wall motion is normal. Diastolic function is normal     Plan:  • S/p IV Lasix 120 mg x 1 in the ED  • Started on Bumex 2 mg bid. • Start potassium 40 meq daily  • Cardiac diet with sodium and fluid restrictions  • Consulted heart failure specialist considering readmission due to acute CHF within 30 days.
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
## 2023-08-09 NOTE — PLAN OF CARE
Problem: PAIN - ADULT  Goal: Verbalizes/displays adequate comfort level or baseline comfort level  Description: Interventions:  - Encourage patient to monitor pain and request assistance  - Assess pain using appropriate pain scale  - Administer analgesics based on type and severity of pain and evaluate response  - Implement non-pharmacological measures as appropriate and evaluate response  - Consider cultural and social influences on pain and pain management  - Notify physician/advanced practitioner if interventions unsuccessful or patient reports new pain  Outcome: Progressing     Problem: INFECTION - ADULT  Goal: Absence or prevention of progression during hospitalization  Description: INTERVENTIONS:  - Assess and monitor for signs and symptoms of infection  - Monitor lab/diagnostic results  - Monitor all insertion sites, i.e. indwelling lines, tubes, and drains  - Monitor endotracheal if appropriate and nasal secretions for changes in amount and color  - [place] appropriate cooling/warming therapies per order  - Administer medications as ordered  - Instruct and encourage patient and family to use good hand hygiene technique  - Identify and instruct in appropriate isolation precautions for identified infection/condition  Outcome: Progressing     Problem: DISCHARGE PLANNING  Goal: Discharge to home or other facility with appropriate resources  Description: INTERVENTIONS:  - Identify barriers to discharge w/patient and caregiver  - Arrange for needed discharge resources and transportation as appropriate  - Identify discharge learning needs (meds, wound care, etc.)  - Arrange for interpretive services to assist at discharge as needed  - Refer to Case Management Department for coordinating discharge planning if the patient needs post-hospital services based on physician/advanced practitioner order or complex needs related to functional status, cognitive ability, or social support system  Outcome: Progressing Problem: Knowledge Deficit  Goal: Patient/family/caregiver demonstrates understanding of disease process, treatment plan, medications, and discharge instructions  Description: Complete learning assessment and assess knowledge base.   Interventions:  - Provide teaching at level of understanding  - Provide teaching via preferred learning methods  Outcome: Progressing

## 2023-08-09 NOTE — PLAN OF CARE
Problem: PAIN - ADULT  Goal: Verbalizes/displays adequate comfort level or baseline comfort level  Description: Interventions:  - Encourage patient to monitor pain and request assistance  - Assess pain using appropriate pain scale  - Administer analgesics based on type and severity of pain and evaluate response  - Implement non-pharmacological measures as appropriate and evaluate response  - Consider cultural and social influences on pain and pain management  - Notify physician/advanced practitioner if interventions unsuccessful or patient reports new pain  8/9/2023 1139 by Cathie Petersen RN  Outcome: Progressing  8/9/2023 0506 by Cathie Petersen RN  Outcome: Progressing     Problem: INFECTION - ADULT  Goal: Absence or prevention of progression during hospitalization  Description: INTERVENTIONS:  - Assess and monitor for signs and symptoms of infection  - Monitor lab/diagnostic results  - Monitor all insertion sites, i.e. indwelling lines, tubes, and drains  - Monitor endotracheal if appropriate and nasal secretions for changes in amount and color  - [place] appropriate cooling/warming therapies per order  - Administer medications as ordered  - Instruct and encourage patient and family to use good hand hygiene technique  - Identify and instruct in appropriate isolation precautions for identified infection/condition  8/9/2023 1139 by Cathie Petersen RN  Outcome: Progressing  8/9/2023 0506 by Cathie Petersen RN  Outcome: Progressing     Problem: DISCHARGE PLANNING  Goal: Discharge to home or other facility with appropriate resources  Description: INTERVENTIONS:  - Identify barriers to discharge w/patient and caregiver  - Arrange for needed discharge resources and transportation as appropriate  - Identify discharge learning needs (meds, wound care, etc.)  - Arrange for interpretive services to assist at discharge as needed  - Refer to Case Management Department for coordinating discharge planning if
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
## 2023-08-09 NOTE — ASSESSMENT & PLAN NOTE
Assessment:  • Smokes currently 1/2 pack/ day.  She has been a smoker for 43 years      Plan:  • Smoking cessation was recommended  • Nicotine replacement was provided

## 2023-08-09 NOTE — PLAN OF CARE
Problem: PAIN - ADULT  Goal: Verbalizes/displays adequate comfort level or baseline comfort level  Description: Interventions:  - Encourage patient to monitor pain and request assistance  - Assess pain using appropriate pain scale  - Administer analgesics based on type and severity of pain and evaluate response  - Implement non-pharmacological measures as appropriate and evaluate response  - Consider cultural and social influences on pain and pain management  - Notify physician/advanced practitioner if interventions unsuccessful or patient reports new pain  Outcome: Progressing     Problem: INFECTION - ADULT  Goal: Absence or prevention of progression during hospitalization  Description: INTERVENTIONS:  - Assess and monitor for signs and symptoms of infection  - Monitor lab/diagnostic results  - Monitor all insertion sites, i.e. indwelling lines, tubes, and drains  - Monitor endotracheal if appropriate and nasal secretions for changes in amount and color  - [place] appropriate cooling/warming therapies per order  - Administer medications as ordered  - Instruct and encourage patient and family to use good hand hygiene technique  - Identify and instruct in appropriate isolation precautions for identified infection/condition  Outcome: Progressing     Problem: DISCHARGE PLANNING  Goal: Discharge to home or other facility with appropriate resources  Description: INTERVENTIONS:  - Identify barriers to discharge w/patient and caregiver  - Arrange for needed discharge resources and transportation as appropriate  - Identify discharge learning needs (meds, wound care, etc.)  - Arrange for interpretive services to assist at discharge as needed  - Refer to Case Management Department for coordinating discharge planning if the patient needs post-hospital services based on physician/advanced practitioner order or complex needs related to functional status, cognitive ability, or social support system  Outcome: Progressing Problem: Knowledge Deficit  Goal: Patient/family/caregiver demonstrates understanding of disease process, treatment plan, medications, and discharge instructions  Description: Complete learning assessment and assess knowledge base.   Interventions:  - Provide teaching at level of understanding  - Provide teaching via preferred learning methods  Outcome: Progressing

## 2023-08-10 LAB
ANION GAP SERPL CALCULATED.3IONS-SCNC: 6 MMOL/L
BACTERIA UR CULT: NORMAL
BUN SERPL-MCNC: 24 MG/DL (ref 5–25)
CALCIUM SERPL-MCNC: 8.7 MG/DL (ref 8.4–10.2)
CHLORIDE SERPL-SCNC: 102 MMOL/L (ref 96–108)
CO2 SERPL-SCNC: 30 MMOL/L (ref 21–32)
CREAT SERPL-MCNC: 2.18 MG/DL (ref 0.6–1.3)
ERYTHROCYTE [DISTWIDTH] IN BLOOD BY AUTOMATED COUNT: 14.9 % (ref 11.6–15.1)
GFR SERPL CREATININE-BSD FRML MDRD: 24 ML/MIN/1.73SQ M
GLUCOSE SERPL-MCNC: 139 MG/DL (ref 65–140)
HCT VFR BLD AUTO: 34.3 % (ref 34.8–46.1)
HGB BLD-MCNC: 10.4 G/DL (ref 11.5–15.4)
MAGNESIUM SERPL-MCNC: 2.1 MG/DL (ref 1.9–2.7)
MCH RBC QN AUTO: 25.4 PG (ref 26.8–34.3)
MCHC RBC AUTO-ENTMCNC: 30.3 G/DL (ref 31.4–37.4)
MCV RBC AUTO: 84 FL (ref 82–98)
PLATELET # BLD AUTO: 141 THOUSANDS/UL (ref 149–390)
PMV BLD AUTO: 11.1 FL (ref 8.9–12.7)
POTASSIUM SERPL-SCNC: 3.9 MMOL/L (ref 3.5–5.3)
RBC # BLD AUTO: 4.09 MILLION/UL (ref 3.81–5.12)
SODIUM SERPL-SCNC: 138 MMOL/L (ref 135–147)
WBC # BLD AUTO: 3.82 THOUSAND/UL (ref 4.31–10.16)

## 2023-08-10 PROCEDURE — 99232 SBSQ HOSP IP/OBS MODERATE 35: CPT | Performed by: INTERNAL MEDICINE

## 2023-08-10 PROCEDURE — 80048 BASIC METABOLIC PNL TOTAL CA: CPT

## 2023-08-10 PROCEDURE — 83735 ASSAY OF MAGNESIUM: CPT

## 2023-08-10 PROCEDURE — 85027 COMPLETE CBC AUTOMATED: CPT

## 2023-08-10 RX ORDER — METOLAZONE 5 MG/1
2.5 TABLET ORAL ONCE
Status: DISCONTINUED | OUTPATIENT
Start: 2023-08-10 | End: 2023-08-10

## 2023-08-10 RX ORDER — METOLAZONE 5 MG/1
2.5 TABLET ORAL ONCE
Status: COMPLETED | OUTPATIENT
Start: 2023-08-10 | End: 2023-08-10

## 2023-08-10 RX ORDER — BUMETANIDE 0.25 MG/ML
2 INJECTION INTRAMUSCULAR; INTRAVENOUS
Status: DISCONTINUED | OUTPATIENT
Start: 2023-08-10 | End: 2023-08-12

## 2023-08-10 RX ADMIN — BUMETANIDE 2 MG: 0.25 INJECTION INTRAMUSCULAR; INTRAVENOUS at 14:49

## 2023-08-10 RX ADMIN — OXYCODONE HYDROCHLORIDE 5 MG: 5 TABLET ORAL at 05:37

## 2023-08-10 RX ADMIN — DICLOFENAC SODIUM 2 G: 10 GEL TOPICAL at 14:08

## 2023-08-10 RX ADMIN — METOPROLOL SUCCINATE 50 MG: 50 TABLET, EXTENDED RELEASE ORAL at 09:40

## 2023-08-10 RX ADMIN — BUMETANIDE 2 MG: 0.25 INJECTION INTRAMUSCULAR; INTRAVENOUS at 18:42

## 2023-08-10 RX ADMIN — RIVAROXABAN 15 MG: 15 TABLET, FILM COATED ORAL at 17:18

## 2023-08-10 RX ADMIN — BUMETANIDE 2 MG: 0.25 INJECTION INTRAMUSCULAR; INTRAVENOUS at 09:40

## 2023-08-10 RX ADMIN — CYCLOBENZAPRINE 5 MG: 10 TABLET, FILM COATED ORAL at 22:23

## 2023-08-10 RX ADMIN — ACETAMINOPHEN 975 MG: 325 TABLET, FILM COATED ORAL at 22:23

## 2023-08-10 RX ADMIN — ACETAMINOPHEN 975 MG: 325 TABLET, FILM COATED ORAL at 14:08

## 2023-08-10 RX ADMIN — DICLOFENAC SODIUM 2 G: 10 GEL TOPICAL at 22:26

## 2023-08-10 RX ADMIN — DICLOFENAC SODIUM 2 G: 10 GEL TOPICAL at 09:53

## 2023-08-10 RX ADMIN — PANTOPRAZOLE SODIUM 40 MG: 40 TABLET, DELAYED RELEASE ORAL at 09:40

## 2023-08-10 RX ADMIN — METOLAZONE 2.5 MG: 5 TABLET ORAL at 14:08

## 2023-08-10 RX ADMIN — Medication 2.5 MG: at 09:51

## 2023-08-10 RX ADMIN — ACETAMINOPHEN 975 MG: 325 TABLET, FILM COATED ORAL at 05:37

## 2023-08-10 RX ADMIN — DICLOFENAC SODIUM 2 G: 10 GEL TOPICAL at 17:18

## 2023-08-10 RX ADMIN — Medication 2.5 MG: at 17:18

## 2023-08-10 RX ADMIN — POTASSIUM CHLORIDE 40 MEQ: 1500 TABLET, EXTENDED RELEASE ORAL at 09:47

## 2023-08-10 RX ADMIN — NICOTINE 1 PATCH: 14 PATCH, EXTENDED RELEASE TRANSDERMAL at 09:40

## 2023-08-10 RX ADMIN — DOXAZOSIN 2 MG: 1 TABLET ORAL at 22:23

## 2023-08-10 RX ADMIN — AMLODIPINE BESYLATE 5 MG: 5 TABLET ORAL at 09:40

## 2023-08-10 NOTE — UTILIZATION REVIEW
Initial Clinical Review    Admission: Date/Time/Statement:   Admission Orders (From admission, onward)     Ordered        08/09/23 0435  INPATIENT ADMISSION  Once                      Orders Placed This Encounter   Procedures   • INPATIENT ADMISSION     Standing Status:   Standing     Number of Occurrences:   1     Order Specific Question:   Level of Care     Answer:   Med Surg [16]     Order Specific Question:   Estimated length of stay     Answer:   More than 2 Midnights     Order Specific Question:   Certification     Answer:   I certify that inpatient services are medically necessary for this patient for a duration of greater than two midnights. See H&P and MD Progress Notes for additional information about the patient's course of treatment. ED Arrival Information     Expected   -    Arrival   8/8/2023 22:53    Acuity   Urgent            Means of arrival   Wheelchair    Escorted by   Family Member    Service   Hospitalist    Admission type   Emergency            Arrival complaint   SOB / LEG SWELLING           Chief Complaint   Patient presents with   • Leg Swelling     Pt reports bilateral leg swelling xcouple days, painful, chest pain, SOB, diarrhea, abd pains, denies n/v       Initial Presentation: 62 y.o. female with a PMH of EFrHF, A-fib (s/p ablation), pacemaker implant, tachycardia induced cardiomyopathy, HTN, GERD and CKD who presents with CHF exacerbation. Patient was admitted here in mid-July for another CHF exacerbation. Patient reports that for the past 4 days she has noticed increased edema of her lower extremities and worsened exertional dyspnea. She states that she takes all of her medications as prescribes and restricts her sodium intake. Patient notes that she has doubled up on her torsemide for the past 2 days, equating to 120 mg each day. She adds that she has not noticed any improvement in her lower extremity edema or exertional dyspnea after increasing her dose.  Patient states that she was recently switched from lasix to torsemide. She denies any increase of fluid intake at home. Patient states she has some constant dull left-sided chest pain, on exam this is reproducible on palpation. She adds that when she exerts herself she notices this chest discomfort more and becomes short of breath which are both relieved with rest. Patient informs us that for the past month she has had worsening LLQ abdominal pain. She reports that she has had diarrhea 3-4 times a day that is relieved for a few hours after defecating. Patient notes eating red sauce and anything with peanuts makes her LLQ abdominal pain worse. She states that it sometimes hurts when she defecates but has not visualized any blood. Patient states that she has had nausea and She adds that she has a history of diverticulitis. Plan: Inpatient admission for evaluation and treatment of CHF, hypokalemia, DANIELA superimposed on CKD with creatinine of 2.61 (baseline 1.6-2.2), HTN, Afib, tobacco abuse: IV Bumex 2 mg bid, potassium 40 mew daily, Heart Failure consult, [facility] in am, continue home amlodipine, metoprolol and Cardura, continue Xarelto, start nicotine replacement. Cardiology consult: IV Bumex 2 mg bid, Echo, continue amlodipine, doxazosin and metoprolol, continue Xarelto, telemetry. Date: 8/10   Day 2:     Internal medicine: Appreciate Cardiology recommendations- Increase IV Bumex to 2 mg TID + 1 dose of metolazone 2.5 mg x 1 this afternoon, hold amlodipine for adequate diuresis, continue Toprol and Xarelto. Continue potassium 40 meq daily. Cardiac diet with sodium and fluid restrictions. Continue Cardura.        ED Triage Vitals [08/08/23 2301]   Temperature Pulse Respirations Blood Pressure SpO2   98.4 °F (36.9 °C) 70 20 (!) 178/95 98 %      Temp Source Heart Rate Source Patient Position - Orthostatic VS BP Location FiO2 (%)   Oral Monitor Sitting Right arm --      Pain Score       9          Wt Readings from Last 1 Encounters: 08/10/23 112 kg (246 lb 0.5 oz)     Additional Vital Signs:     Date/Time Temp Pulse Resp BP MAP (mmHg) SpO2 O2 Device   08/10/23 0940 -- 69 -- 114/83 -- -- --   08/10/23 0733 98.3 °F (36.8 °C) 69 -- 129/61 88 -- --   08/09/23 2100 97.6 °F (36.4 °C) 70 18 144/94 114 93 % --   08/09/23 1850 -- 70 -- 128/72 -- -- --   08/09/23 1457 97.4 °F (36.3 °C) Abnormal  69 -- 115/70 87 -- None (Room air)   08/09/23 1409 -- 69 -- 126/61 -- 96 % None (Room air)   08/09/23 1015 -- 69 -- -- -- -- --   08/09/23 0821 97.9 °F (36.6 °C) 70 18 118/66 86 95 % None (Room air)   08/09/23 0200 -- 69 -- 126/66 90 97 % --   08/09/23 0045 -- 69 20 138/92 110 97 % None (Room air)     Pertinent Labs/Diagnostic Test Results:   CT abdomen pelvis wo contrast   Final Result by Margret Car MD (08/09 0593)      No acute findings identified in the abdomen or pelvis. Unchanged punctate right upper pole intrarenal calculus. Workstation performed: [id number]         XR chest 2 views   ED Interpretation by Kathy Layton, [address] [place] [address] (85/12 1422)   Mild pulmonary vascular congestion. No other acute cardiopulmonary disease identified by me. Final Result by Gregg Winston MD (64/09 8809)      Mild cardiomegaly      Mild vascular congestion                  Workstation performed: [id number]           8/9 Echo: Interpretation Summary       •  Left Ventricle: Left ventricular cavity size is normal. Wall thickness is moderately increased. There is moderate asymmetric hypertrophy of the apical and septal wall. The left ventricular ejection fraction is 70%.  Systolic function is normal.  •  Right Ventricle: Systolic function is normal.    8/8 EKG:  Ventricular-paced rhythm      Results from last 7 days   Lab Units 08/09/23  0028   WBC Thousand/uL 4.82   HEMOGLOBIN g/dL 9.9*   HEMATOCRIT % 33.1*   PLATELETS Thousands/uL 146*   NEUTROS ABS Thousands/µL 3.34         Results from last 7 days   Lab Units 08/09/23  0826 08/09/23  0028   SODIUM mmol/L 137 140   POTASSIUM mmol/L 4.8 3.0*   CHLORIDE mmol/L 103 102   CO2 mmol/L 28 30   ANION GAP mmol/L 6 8   BUN mg/dL 26* 29*   CREATININE mg/dL 2.41* 2.61*   EGFR ml/min/1.73sq m 21 19   CALCIUM mg/dL 8.2* 8.7   MAGNESIUM mg/dL 2.2  --      Results from last 7 days   Lab Units 08/09/23  0028   AST U/L 18   ALT U/L 14   ALK PHOS U/L 74   TOTAL PROTEIN g/dL 7.1   ALBUMIN g/dL 3.9   TOTAL BILIRUBIN mg/dL 0.28         Results from last 7 days   Lab Units 08/09/23  0826 08/09/23  0028   GLUCOSE RANDOM mg/dL 154* 96           Results from last 7 days   Lab Units 08/09/23  0500 08/09/23  0302 08/09/23  0028   HS TNI 0HR ng/L  --   --  85*   HS TNI 2HR ng/L  --  82*  --    HSTNI D2 ng/L  --  -3  --    HS TNI 4HR ng/L 86*  --   --    HSTNI D4 ng/L 1  --   --            Results from last 7 days   Lab Units 08/09/23  0028   BNP pg/mL 539*         Results from last 7 days   Lab Units 08/09/23  0028   LIPASE u/L 81         Results from last 7 days   Lab Units 08/09/23  0039   CLARITY UA  Turbid   COLOR UA  Light Yellow   SPEC GRAV UA  1.014   PH UA  5.5   GLUCOSE UA mg/dl Negative   KETONES UA mg/dl Negative   BLOOD UA  Negative   PROTEIN UA mg/dl Trace*   NITRITE UA  Negative   BILIRUBIN UA  Negative   UROBILINOGEN UA (BE) mg/dl <2.0   LEUKOCYTES UA  Large*   WBC UA /hpf 30-50*   RBC UA /hpf 1-2   BACTERIA UA /hpf Occasional   EPITHELIAL CELLS WET PREP /hpf Occasional   MUCUS THREADS  Occasional*         Results from last 7 days   Lab Units 08/09/23  0039   URINE CULTURE  40,000-49,000 cfu/ml         ED Treatment:   Medication Administration from 08/08/2023 2253 to 08/09/2023 1423       Date/Time Order Dose Route Action     08/08/2023 2351 EDT acetaminophen (TYLENOL) tablet 650 mg 650 mg Oral Given     08/09/2023 0027 EDT ondansetron (ZOFRAN) injection 4 mg 4 mg Intravenous Given     08/09/2023 0032 EDT furosemide (LASIX) 120 mg in dextrose 5 % 50 mL IVPB 120 mg Intravenous New Bag     08/09/2023 0025 EDT HYDROmorphone (DILAUDID) injection 0.5 mg 0.5 mg Intravenous Given     08/09/2023 0200 EDT potassium chloride (K-DUR,KLOR-CON) CR tablet 40 mEq 40 mEq Oral Given     08/09/2023 0818 EDT amLODIPine (NORVASC) tablet 5 mg 5 mg Oral Given     08/09/2023 0916 EDT metoprolol succinate (TOPROL-XL) 24 hr tablet 50 mg 50 mg Oral Given     08/09/2023 0818 EDT pantoprazole (PROTONIX) EC tablet 40 mg 40 mg Oral Given     08/09/2023 0818 EDT nicotine (NICODERM CQ) 14 mg/24hr TD 24 hr patch 1 patch 1 patch Transdermal Medication Applied     08/09/2023 0917 EDT bumetanide (BUMEX) injection 2 mg 2 mg Intravenous Given     08/09/2023 0818 EDT potassium chloride (K-DUR,KLOR-CON) CR tablet 40 mEq 40 mEq Oral Given     08/09/2023 1204 EDT HYDROmorphone HCl (DILAUDID) injection 0.2 mg 0.2 mg Intravenous Given        Past Medical History:   Diagnosis Date   • ACS (acute coronary syndrome) ([address]) 02/07/2021   • Acute on chronic diastolic CHF 24/34/1475   • Anemia 1/14/2023   • Arthritis    • Atrial fibrillation (HCC)    • Atrial fibrillation with rapid ventricular response ([address]) 03/20/2016   • Breast lump    • CKD (chronic kidney disease) stage 3, GFR 30-59 ml/min ([facility])    • Disease of thyroid gland    • Elevated troponin 09/09/2019   • Epigastric abdominal tenderness 08/15/2021   • 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 ([address]) 05/25/2020   • GERD (gastroesophageal reflux disease)    • H/O transfusion 1987   • Hepatitis C     resolved   • History of tachycardia induced cardiomyopathy 05/2021    in setting of rapid atrial flutter in 05/2021 and again 06/2022   • History of ventricular tachycardia 07/2016    s/p ICD   • Hyperlipidemia    • Hypertension    • Hypokalemia 7/23/2023   • Inappropriate ICD discharge for atrial flutter 04/2023   • NSTEMI (non-ST elevated myocardial infarction) ([address]) 12/26/2018   • Sleep apnea     no cpap     Present on Admission:  • Acute on chronic diastolic CHF (congestive heart failure) (HCC)  • Paroxysmal A-fib ([facility])  • Tobacco abuse  • Essential hypertension      Admitting Diagnosis: Hypokalemia [E87.6]  Leg swelling [M79.89]  LLQ abdominal pain [R10.32]  Acute exacerbation of CHF (congestive heart failure) (HCC) [I50.9]  Bilateral lower extremity edema [R60.0]  Age/Sex: 62 y.o. female  Admission Orders:  Scheduled Medications:  acetaminophen, 975 mg, Oral, Q8H [address]  amLODIPine, 5 mg, Oral, Daily  bumetanide, 2 mg, Intravenous, BID  cyclobenzaprine, 5 mg, Oral, HS  Diclofenac Sodium, 2 g, Topical, 4x Daily  doxazosin, 2 mg, Oral, HS  metoprolol succinate, 50 mg, Oral, Daily  nicotine, 1 patch, Transdermal, Daily  pantoprazole, 40 mg, Oral, Daily  potassium chloride, 40 mEq, Oral, Once  potassium chloride, 40 mEq, Oral, Daily  rivaroxaban, 15 mg, Oral, QPM      Continuous IV Infusions:     PRN Meds:  oxyCODONE, 2.5 mg, Oral, Q6H PRN        IP CONSULT TO NUTRITION SERVICES  IP CONSULT TO CARDIOLOGY    Network Utilization Review Department  ATTENTION: Please call with any questions or concerns to [phone] and carefully listen to the prompts so that you are directed to the right person. All voicemails are confidential.  Preethi Amen all requests for admission clinical reviews, approved or denied determinations and any other requests to dedicated fax number below belonging to the campus where the patient is receiving treatment.  List of dedicated fax numbers for the Facilities:  [place] DENIALS (Administrative/Medical Necessity) [phone]   [facility]) [phone]   [address] [facility] [address] [phone]   [facility] [phone]   [address] [phone] [address] [facility] [address] [place] [phone]   [address] [id number] [place] [address] Nn [phone]

## 2023-08-10 NOTE — UTILIZATION REVIEW
NOTIFICATION OF INPATIENT ADMISSION   AUTHORIZATION REQUEST   SERVICING FACILITY:   [address] [place] [address]  Tax ID: [id number]  NPI: [id number]   ATTENDING PROVIDER:  Attending Name and NPI#: Saleem Atwood Md [[id number]]  Address: [address] [place] [address]  Phone: [phone]     ADMISSION INFORMATION:  Place of Service: Inpatient [address] [facility]   Place of Service Code: 21  Inpatient Admission Date/Time: 8/9/23  4:35 AM  Discharge Date/Time: No discharge date for patient encounter. Admitting Diagnosis Code/Description:  Hypokalemia [E87.6]  Leg swelling [M79.89]  LLQ abdominal pain [R10.32]  Acute exacerbation of CHF (congestive heart failure) ([address]) [I50.9]  Bilateral lower extremity edema [R60.0]     UTILIZATION REVIEW CONTACT:  Meghan Arreola Utilization   Network Utilization Review Department  Phone: [phone]  Fax: [phone]  Email: [email or web address]  Contact for approvals/pending authorizations, clinical reviews, and discharge. PHYSICIAN ADVISORY SERVICES:  Medical Necessity Denial & Zieo-pz-Lffp Review  Phone: [phone]  Fax: [phone]  Email: [email or web address]

## 2023-08-10 NOTE — PROGRESS NOTES
General Cardiology   Progress Note -  Team One   Gasper Albarran 62 y.o. female MRN: [id number]    Unit/Bed#: S -01 Encounter: [id number]    Assessment  1. Acute on chronic diastolic CHF exacerbation  -Recurrent issue.   -Acute etiology - likely 2/2 ineffective diuretic dosing, pt expresses compliance with diuretic therapy as well as dietary/fluid restrictions. 2. Hx of recovered NICM - tachy-mediated   -Volume overloaded on exam  -BNP level 539, previously 272 on 7/22/2023.  -X-ray PA/lateral - mild pulmonary vascular congestion.  -Limited TTE 7/5236; EF 65, diastolic function normal, wall motion normal, RV normal size/systolic function, trace MR. Trivial pericardial effusion anterior to the heart.  -Outpatient GDMT; metoprolol succinate 50 mg daily; not previously on ARNI/ACE/ARB, aldosterone antagonist or SG L T2 inhibitor in the setting of advanced CKD. -Outpatient diuretic regimen; torsemide 60 mg daily --pt increased her torsemide to 60 mg twice daily 3 days prior to her admission.  -Inpatient diuretic regimen; IV Bumex 2 mg twice daily  -24 hr I&O balance; -1.2L; overall -1.9 L  -Approximate dry weight around 234-235 lbs; recent discharge weight was 235 pounds on 7/25. Recent weight at home 246 pounds. SS weight 246 lbs today. 2. History of monomorphic VT  s/p MDT DC AICD in situ (since 07/2016)  -Interrogation from  6/19/2023: AP 0%.  98%. Lead parameters WNL. AF burden 41.1%. OptiVol WNL. Normal device function. 3. History of persistent atrial flutter/atrial fibrillation s/p AVJ ablation (5/2023)  -S/p unspecified ablation at [facility] in 2015.  -S/p Afib ablation with PVI in 5/2020.  -S/p atypical flutter and micro-reentrant atrial tachycardia ablation in 6/2022  -Status post AV miguel ablation procedure 5/2023  -Telemetry review - V paced - rate 70 bpm  -[id number] = 3 (sex, HF, HTN)  -On metoprolol succinate 50 mg daily  -On Xarelto 15 mg daily.   4. Hypertension  -Avg /74, last recorded at 114/83  -Outpatient antihypertensive regimen - amlodipine 5 mg daily, doxazosin 2 mg daily, and metoprolol succinate 50 mg daily daily  -Inpatient BP regimen; amlodipine 5 mg daily, doxazosin 2 mg daily, metoprolol succinate 50 mg daily  5. Hyperlipidemia  -Lipid panel (4/5/2023) - /triglycerides 83/HDL 40/LDL 68  -Not maintained on statin therapy outpatient  6. CKD stage IV  -Follows with nephrology as an outpatient.  -Baseline creatinine 1.7-2.1.  -Creatinine 2.61 on admission, currently 2.41.  7. Nicotine dependence.  -Longstanding history of. Currently smoking half a pack per day. 8. History of cocaine use  9. Marijuana use: smoking "1 drag of a joint" at night to help her sleep. 10. SURINDER - CPAP at      Plan  -Pt remains volume overloaded on exam. No respiratory complaints, and no supplemental O2 requirements. Weight today unchanged from prior 'home' admission weight of 246 lbs. Sub optimal net negative fluid balance w/current IV diuretic regimen. Increase IV Bumex to 2 mg TID + 1 dose of metolazone 2.5 mg x 1 this afternoon.   -Hold amlodipine -- to allow more room in BP for adequate diuresis. Can continue metoprolol succinate 50 mg daily  -ARNI/ACEi/ARB, aldosterone antagonist or SGLT2 inhibitor precluded in the setting of advanced CKD. -Continue Xarelto for systemic AC  -Smoking cessation advised. Nicotine patch offered.  -Strict I&O monitoring, daily standing weights, 2 g Na+ diet 1.8 LFR.  -Monitor renal function electrolytes closely. Replete to maintain K+ level 4.0 magnesium level 2.0.  -Monitor on telemetry - with ongoing aggressive IV diuresis given history of electrolyte derangements as well as ventricular arrhythmias.     Subjective  Review of Systems   Constitutional: Positive for malaise/fatigue. Negative for chills and fever. Eyes: Negative for visual disturbance. Cardiovascular: Positive for dyspnea on exertion and leg swelling.  Negative for chest pain, orthopnea and palpitations. Respiratory: Negative for cough and shortness of breath. Gastrointestinal: Positive for bloating. Negative for abdominal pain. Neurological: Negative for dizziness, headaches and light-headedness. Objective:   Physical Exam  Vitals and nursing note reviewed. Constitutional:       General: She is not in acute distress. Appearance: She is obese. She is not diaphoretic. HENT:      Head: Normocephalic and atraumatic. Eyes:      General: No scleral icterus. Neck:      Comments: jvd  Cardiovascular:      Rate and Rhythm: Normal rate. Pulses: Normal pulses. Heart sounds: Normal heart sounds. No murmur heard. Comments: V paced  Pulmonary:      Effort: Pulmonary effort is normal.      Breath sounds: Rales present. No wheezing. Abdominal:      Palpations: Abdomen is soft. Musculoskeletal:      Right lower leg: Edema present. Left lower leg: Edema present. Skin:     General: Skin is warm and dry. Capillary Refill: Capillary refill takes less than 2 seconds. Neurological:      General: No focal deficit present. Mental Status: She is alert and oriented to person, place, and time. Psychiatric:         Mood and Affect: Mood normal.         Vitals: Blood pressure 114/83, pulse 69, temperature 98.3 °F (36.8 °C), temperature source Oral, resp. rate 18, height 5' 7" (1.702 m), weight 112 kg (246 lb 0.5 oz), SpO2 93 %, not currently breastfeeding.,     Body mass index is 38.53 kg/m². ,   Systolic (32EBG), NTC:582 , Min:114 , KMK:472     Diastolic (38JQD), ZVW:00, Min:61, Max:94      Intake/Output Summary (Last 24 hours) at 8/10/2023 1016  Last data filed at 8/10/2023 0957  Gross per 24 hour   Intake --   Output 1700 ml   Net -1700 ml     Weight (last 2 days)     Date/Time Weight    08/10/23 0600 112 (246.03)    08/09/23 1457 113 (249)    08/08/23 2332 113 (249.12)          LABORATORY RESULTS      CBC with diff:   Results from last 7 days   Lab Units 23  0028   WBC Thousand/uL 4.82   HEMOGLOBIN g/dL 9.9*   HEMATOCRIT % 33.1*   MCV fL 85   PLATELETS Thousands/uL 146*   RBC Million/uL 3.90   MCH pg 25.4*   MCHC g/dL 29.9*   RDW % 15.1   MPV fL 12.2   NRBC AUTO /100 WBCs 0       CMP:  Results from last 7 days   Lab Units 23  0826 23  0028   POTASSIUM mmol/L 4.8 3.0*   CHLORIDE mmol/L 103 102   CO2 mmol/L 28 30   BUN mg/dL 26* 29*   CREATININE mg/dL 2.41* 2.61*   CALCIUM mg/dL 8.2* 8.7   AST U/L  --  18   ALT U/L  --  14   ALK PHOS U/L  --  74   EGFR ml/min/1.73sq m        BMP:  Results from last 7 days   Lab Units 23  0826 23  0028   POTASSIUM mmol/L 4.8 3.0*   CHLORIDE mmol/L 103 102   CO2 mmol/L 28 30   BUN mg/dL 26* 29*   CREATININE mg/dL 2.41* 2.61*   CALCIUM mg/dL 8.2* 8.7       Lab Results   Component Value Date    NTBNP 9,053 (H) 2023    NTBNP 5,423 (H) 2023    NTBNP 16,909 (H) 2022        Results from last 7 days   Lab Units 23  0826   MAGNESIUM mg/dL 2.2                         Lipid Profile:   No results found for: "CHOL"  Lab Results   Component Value Date    HDL 40 (L) 2023    HDL 42 (L) 2021    HDL 43 2020     Lab Results   Component Value Date    LDLCALC 68 2023    LDLCALC 57 2021    LDLCALC 72 2020     Lab Results   Component Value Date    TRIG 83 2023    TRIG 80.9 2021    TRIG 89 2020       Cardiac testing:   Results for orders placed during the hospital encounter of 21    Echo complete with contrast if indicated    Narrative  [facility] [address], [address] [place] [address]    Transthoracic Echocardiogram  2D, M-mode, Doppler, and Color Doppler    Study date:  25-May-2021    Patient: Fidencio Stanton  MR number: [id number]  Account number: [de-identified]  : 1965  Age: 64 years  Gender: Female  Status: Inpatient  Location: [facility]  Height: 67 in  Weight: 212 lb  BP: 118/ 62 mmHg    Indications: Afib    Diagnoses: I48.0 - Atrial fibrillation    Sonographer:  PRISCILLA Lemus  Referring Physician:  Gucci Shaw MD  Group:  [facility]  Interpreting Physician:  Linda Ying MD    SUMMARY    LEFT VENTRICLE:  Systolic function was moderately to markedly reduced. Ejection fraction was estimated to be 30 %. There was moderate diffuse hypokinesis. There was severe concentric hypertrophy. There was significant hypertrophy of the LV apex. RIGHT VENTRICLE:  The size was normal.  Systolic function was reduced. LEFT ATRIUM:  The atrium was dilated. HISTORY: PRIOR HISTORY: Cocaine abuse, Smoker, CKD III, Congestive heart failure. Hypertrophic cardiomyopathy. Atrial fibrillation. Risk factors: hypercholesterolemia. PRIOR PROCEDURES: ICD implantation. Arrhythmia ablation. PROCEDURE: The study was performed in the [facility]. This was a routine study. The transthoracic approach was used. The study included complete 2D imaging, M-mode, complete spectral Doppler, and color Doppler. The heart rate was 138  bpm, at the start of the study. Images were obtained from the parasternal, apical, subcostal, and suprasternal notch acoustic windows. Intravenous contrast ( .6 mL Definity in NSS) was administered. Echocardiographic views were limited due  to poor acoustic window availability and lung interference. This was a technically difficult study. LEFT VENTRICLE: Size was normal. Systolic function was moderately to markedly reduced. Ejection fraction was estimated to be 30 %. There was moderate diffuse hypokinesis. Wall thickness was markedly increased. There was severe concentric  hypertrophy. There was significant hypertrophy of the LV apex. DOPPLER: Due to tachycardia, there was fusion of early and atrial contributions to ventricular filling. The study was not technically sufficient to allow evaluation of LV  diastolic function.     RIGHT VENTRICLE: The size was normal. Systolic function was reduced. Wall thickness was normal. A pacing wire was present. LEFT ATRIUM: The atrium was dilated. RIGHT ATRIUM: Size was normal. A pacing wire was present. MITRAL VALVE: Valve structure was normal. There was normal leaflet separation. DOPPLER: The transmitral velocity was within the normal range. There was no evidence for stenosis. There was trace regurgitation. AORTIC VALVE: The valve was trileaflet. Leaflets exhibited normal thickness and normal cuspal separation. DOPPLER: Transaortic velocity was within the normal range. There was no evidence for stenosis. There was no significant  regurgitation. TRICUSPID VALVE: The valve structure was normal. There was normal leaflet separation. DOPPLER: The transtricuspid velocity was within the normal range. There was no evidence for stenosis. There was trace regurgitation. Pulmonary artery  systolic pressure was within the normal range. Estimated peak PA pressure was 21 mmHg. PULMONIC VALVE: Leaflets exhibited normal thickness, no calcification, and normal cuspal separation. DOPPLER: The transpulmonic velocity was within the normal range. There was trace regurgitation. PERICARDIUM: There was no pericardial effusion. The pericardium was normal in appearance. AORTA: The root exhibited normal size. SYSTEMIC VEINS: IVC: The inferior vena cava was normal in size.  Respirophasic changes were normal.    SYSTEM MEASUREMENT TABLES    2D  %FS: 27.49 %  Ao Diam: 2.77 cm  EDV(Teich): 57.94 ml  EF(Teich): 54.26 %  ESV(Teich): 26.5 ml  IVSd: 2.46 cm  LA Area: 26.06 cm2  LA Diam: 4.27 cm  LVIDd: 3.7 cm  LVIDs: 2.68 cm  LVPWd: 1.79 cm  RA Area: 18.49 cm2  RVIDd: 3.01 cm  RWT: 0.97  SV(Teich): 31.44 ml    CW  TR Vmax: 2.14 m/s  TR maxP.28 mmHg    MM  TAPSE: 1.51 cm    [employer] Accredited Echocardiography Laboratory    Prepared and electronically signed by    Von Nieves MD  Signed [id number] 14:44:53    Results for orders placed during the hospital encounter of 20    HUBER    Narrative  [address]  [address] [place] [address]  [phone]    Transesophageal Echocardiogram  2D, Doppler, and Color Doppler    Study date:  20-May-2020    Patient: Maxwell Barakat  MR number: [id number]  Account number: [de-identified]  : 1965  Age: 54 years  Gender: Female  Status: Outpatient  Location: Cath lab  Height: 67 in  Weight: 221 lb  BP:    Indications: AFIB    Diagnoses: I48.0 - Atrial fibrillation    Sonographer:  PRISCILLA Espinal  Interpreting Physician:  Izabella Serra MD  Primary Physician:  James Malone MD  Referring Physician:  Izabella Serra MD  Group:  [facility]    SUMMARY    LEFT VENTRICLE:  Systolic function was normal. Ejection fraction was estimated to be 60 %. Wall thickness was moderately increased. There was moderate concentric hypertrophy. LEFT ATRIUM:  The atrium was mildly dilated. LEFT ATRIAL APPENDAGE:  The size was normal.  The function was normal (normal emptying velocity). No thrombus was identified. ATRIAL SEPTUM:  No defect or patent foramen ovale was identified. HISTORY: PRIOR HISTORY: AFIB, PPM, MI, HOCM, HTN, HLD, CKD III, Smoker, Cocaine abuse    PROCEDURE: The procedure was performed in the catheterization laboratory. This was a routine study. The risks and alternatives of the procedure were explained to the patient and informed consent was obtained. The transesophageal approach  was used. The study included complete 2D imaging, complete spectral Doppler, and color Doppler. An adult omniplane probe was inserted by the attending cardiologist. Intubated with ease. One intubation attempt(s). There was no blood  detected on the probe. Image quality was adequate. MEDICATIONS: Anesthesia.     LEFT VENTRICLE: Size was normal. Systolic function was normal. Ejection fraction was estimated to be 60 %. Wall thickness was moderately increased. There was moderate concentric hypertrophy. RIGHT VENTRICLE: The size was normal. Systolic function was normal. Wall thickness was normal. A pacing wire was present. LEFT ATRIUM: The atrium was mildly dilated. No mass was present. There was no spontaneous echo contrast ("smoke"). APPENDAGE: The size was normal. No thrombus was identified. DOPPLER: The function was normal (normal emptying velocity). ATRIAL SEPTUM: No defect or patent foramen ovale was identified. RIGHT ATRIUM: Size was normal. No thrombus was identified. MITRAL VALVE: Valve structure was normal. There was normal leaflet separation. There was no echocardiographic evidence of vegetation. DOPPLER: There was no regurgitation. AORTIC VALVE: The valve was trileaflet. Leaflets exhibited normal thickness and normal cuspal separation. There was no echocardiographic evidence of vegetation. DOPPLER: There was no regurgitation. TRICUSPID VALVE: The valve structure was normal. There was normal leaflet separation. There was no echocardiographic evidence of vegetation. DOPPLER: There was no regurgitation. PERICARDIUM: There was no pericardial effusion. The pericardium was normal in appearance. [facility] Accredited Echocardiography Laboratory    Prepared and electronically signed by    Cheryl Hopkins MD  Signed [id number] 09:25:57    No results found for this or any previous visit. No valid procedures specified. No results found for this or any previous visit.       Meds/Allergies   all current active meds have been reviewed and current meds:   Current Facility-Administered Medications   Medication Dose Route Frequency   • acetaminophen (TYLENOL) tablet 975 mg  975 mg Oral Q8H [address]   • amLODIPine (NORVASC) tablet 5 mg  5 mg Oral Daily   • bumetanide (BUMEX) injection 2 mg  2 mg Intravenous BID   • cyclobenzaprine (FLEXERIL) tablet 5 mg  5 mg Oral HS   • Diclofenac Sodium (VOLTAREN) 1 % topical gel 2 g  2 g Topical 4x Daily   • doxazosin (CARDURA) tablet 2 mg  2 mg Oral HS   • metoprolol succinate (TOPROL-XL) 24 hr tablet 50 mg  50 mg Oral Daily   • nicotine (NICODERM CQ) 14 mg/24hr TD 24 hr patch 1 patch  1 patch Transdermal Daily   • oxyCODONE (ROXICODONE) split tablet 2.5 mg  2.5 mg Oral Q6H PRN   • pantoprazole (PROTONIX) EC tablet 40 mg  40 mg Oral Daily   • potassium chloride (K-DUR,KLOR-CON) CR tablet 40 mEq  40 mEq Oral Once   • potassium chloride (K-DUR,KLOR-CON) CR tablet 40 mEq  40 mEq Oral Daily   • rivaroxaban (XARELTO) tablet 15 mg  15 mg Oral QPM        EKG personally reviewed by CHARLOTTE Coleman. Assessment:  Principal Problem:    Acute on chronic diastolic CHF (congestive heart failure) (HCC)  Active Problems:    Tobacco abuse    Paroxysmal A-fib (HCC)    Essential hypertension    Acute kidney injury superimposed on CKD ([address])    Hypokalemia      Counseling / Coordination of Care  Total floor / unit time spent today 20 minutes. Greater than 50% of total time was spent with the patient and / or family counseling and / or coordination of care. ** Please Note: Dragon 360 Dictation voice to text software may have been used in the creation of this document.  **

## 2023-08-10 NOTE — PROGRESS NOTES
[address] [facility]  Progress Note  Name: Ly White  MRN: [id number]  Unit/Bed#: S -01 I Date of Admission: 8/8/2023   Date of Service: 8/10/2023  Hospital Day: 1    Assessment/Plan   Hypokalemia  Assessment & Plan  · Noted low potassium 3 on admission. · Patient reports taking potassium solution 15 mg daily however she increased Lasix to 120 mg daily for the past 2 days. · Potassium repleted with 40 x 2    Plan  · Continue  potassium 40 mg daily with diuresis    Acute kidney injury superimposed on CKD [facility])  Assessment & Plan  Lab Results   Component Value Date    EGFR 24 08/10/2023    EGFR 21 08/09/2023    EGFR 19 08/09/2023    CREATININE 2.18 (H) 08/10/2023    CREATININE 2.41 (H) 08/09/2023    CREATININE 2.61 (H) 08/09/2023     · Presents with DANIELA. Suspected to be in the setting of CHF exacerbation. · Creatinine baseline 1.6-2.2. · Rest as BMP with a.m. labs  · Monitor for I/O's and daily weight  · Avoid nephrotoxic agents and hypoperfusion    Essential hypertension  Assessment & Plan  Blood Pressure: 136/85  Stable Blood pressures on admission     Plan:  • Hold amlodipine 5 mg daily,   • Continue Metoprolol 50 mg daily, Cardura 2 mg daily at home   • See CHF plan for IV diuresis   • Follow cardiology recommendations    Paroxysmal A-fib [facility])  Assessment & Plan  • S/p AVJ ablation with ventricle pacemaker.  Paced rhythm @70. • Xarelto 15 mg daily. Tobacco abuse  Assessment & Plan  Assessment:  • Smokes currently 1/2 pack/ day.  She has been a smoker for 43 years      Plan:  • Smoking cessation was recommended  • Nicotine replacement was provided    * Acute on chronic diastolic CHF (congestive heart failure) (HCC)  Assessment & Plan  Wt Readings from Last 3 Encounters:   08/10/23 112 kg (246 lb 0.5 oz)   07/25/23 107 kg (235 lb 9.6 oz)   07/21/23 110 kg (243 lb)       Assessment:   • Presented with SOB with exertion, orthopnea, and bilateral lower extremity edema  • Recently discharged on  with weight 235 pounds. Reports increasing in weight to 249 lb. was evaluated by heart failure specialist commended Torsemide 60 mg and discharge. • Patient was taking torsemide 120 milligram for the past 2 days  • S/p 1 dose Lasix 120 mg in the ED  • She uses cocaine, last reported in cardiology note on 3/23. Occasional Marijuana. Smoker. • Last Echo on : EF 65 % Systolic function is normal. Wall motion is normal. Diastolic function is normal     Plan:  • Appreciate Cardiology recommendations- Increase IV Bumex to 2 mg TID + 1 dose of metolazone 2.5 mg x 1 this afternoon, hold amlodipine for adequate diuresis, continue Toprol and Xarelto   • Continue potassium 40 meq daily  • Cardiac diet with sodium and fluid restrictions             VTE Pharmacologic Prophylaxis: VTE Score: 3 Moderate Risk (Score 3-4) - Pharmacological DVT Prophylaxis Ordered: rivaroxaban (Xarelto). Patient Centered Rounds: I performed bedside rounds with nursing staff today. Discussions with Specialists or Other Care Team Provider: Cardiology    Education and Discussions with Family / Patient: Patient declined call to . Current Length of Stay: 1 day(s)  Current Patient Status: Inpatient   Discharge Plan: Anticipate discharge in 24-48 hrs to home. Code Status: Level 1 - Full Code    Subjective:   Patient seen and evaluated at bedside. She has left upper quadrant and left lower quadrant abdominal pain is improving. States that her pain is exacerbated by having a bowel movement. The pain is alleviated after having a bowel movement. Last bowel movement was yesterday. She states that the pain is alleviated with oxycodone. No nausea/vomiting, blood in stool, no fever.     Objective:     Vitals:   Temp (24hrs), Av °F (36.7 °C), Min:97.6 °F (36.4 °C), Max:98.3 °F (36.8 °C)    Temp:  [97.6 °F (36.4 °C)-98.3 °F (36.8 °C)] 98.3 °F (36.8 °C)  HR:  [69-70] 69  Resp:  [18] 18  BP: (108-144)/(61-94) 136/85  SpO2:  [93 %] 93 %  Body mass index is 38.53 kg/m². Input and Output Summary (last 24 hours): Intake/Output Summary (Last 24 hours) at 8/10/2023 1559  Last data filed at 8/10/2023 1453  Gross per 24 hour   Intake --   Output 3000 ml   Net -3000 ml       Physical Exam:   Physical Exam  Vitals and nursing note reviewed. Constitutional:       General: She is not in acute distress. Appearance: She is well-developed. HENT:      Head: Normocephalic and atraumatic. Right Ear: External ear normal.      Left Ear: External ear normal.      Nose: Nose normal.   Eyes:      Extraocular Movements: Extraocular movements intact. Conjunctiva/sclera: Conjunctivae normal.      Pupils: Pupils are equal, round, and reactive to light. Visual Fields: Right eye visual fields normal and left eye visual fields normal.   Cardiovascular:      Rate and Rhythm: Normal rate and regular rhythm. Heart sounds: No murmur heard. Pulmonary:      Effort: Pulmonary effort is normal. No respiratory distress. Breath sounds: Normal breath sounds. No wheezing, rhonchi or rales (Mild, scattered). Abdominal:      General: Bowel sounds are normal.      Palpations: Abdomen is soft. Tenderness: There is abdominal tenderness (mild LLQ, LUQ). Musculoskeletal:         General: No swelling. Cervical back: Neck supple. Right lower leg: Edema (trace) present. Left lower leg: Edema (trace) present. Skin:     General: Skin is warm and dry. Neurological:      Mental Status: She is alert. Cranial Nerves: Cranial nerves 2-12 are intact. Sensory: Sensation is intact. Motor: Motor function is intact. Deep Tendon Reflexes:      Reflex Scores:       Bicep reflexes are 2+ on the right side and 2+ on the left side. Brachioradialis reflexes are 2+ on the right side and 2+ on the left side. Patellar reflexes are 2+ on the right side and 2+ on the left side.   Psychiatric: Mood and Affect: Mood normal.         Speech: Speech normal.         Behavior: Behavior normal. Behavior is cooperative. Additional Data:     Labs:  Results from last 7 days   Lab Units 08/10/23  1052 08/09/23  0028   WBC Thousand/uL 3.82* 4.82   HEMOGLOBIN g/dL 10.4* 9.9*   HEMATOCRIT % 34.3* 33.1*   PLATELETS Thousands/uL 141* 146*   NEUTROS PCT %  --  70   LYMPHS PCT %  --  21   MONOS PCT %  --  7   EOS PCT %  --  2     Results from last 7 days   Lab Units 08/10/23  1052 08/09/23  0826 08/09/23  0028   SODIUM mmol/L 138   < > 140   POTASSIUM mmol/L 3.9   < > 3.0*   CHLORIDE mmol/L 102   < > 102   CO2 mmol/L 30   < > 30   BUN mg/dL 24   < > 29*   CREATININE mg/dL 2.18*   < > 2.61*   ANION GAP mmol/L 6   < > 8   CALCIUM mg/dL 8.7   < > 8.7   ALBUMIN g/dL  --   --  3.9   TOTAL BILIRUBIN mg/dL  --   --  0.28   ALK PHOS U/L  --   --  74   ALT U/L  --   --  14   AST U/L  --   --  18   GLUCOSE RANDOM mg/dL 139   < > 96    < > = values in this interval not displayed.                        Lines/Drains:  Invasive Devices     Peripheral Intravenous Line  Duration           Peripheral IV 08/09/23 Right Antecubital 1 day                  Telemetry:  Telemetry Orders (From admission, onward)             24 Hour Telemetry Monitoring  Continuous x 24 Hours (Telem)        Question:  Reason for 24 Hour Telemetry  Answer:  Decompensated CHF- and any one of the following: continuous diuretic infusion or total diuretic dose >200 mg daily, associated electrolyte derangement (I.e. K < 3.0), ionotropic drip (continuous infusion), hx of ventricular arrhythmia, or new EF < 35%                 Telemetry Reviewed: Normal Sinus Rhythm, paced  Indication for Continued Telemetry Use: Arrthymias requiring medical therapy               Imaging: Reviewed radiology reports from this admission including: chest xray and abdominal/pelvic CT    Recent Cultures (last 7 days):   Results from last 7 days   Lab Units 08/09/23  0039   URINE CULTURE  40,000-49,000 cfu/ml       Last 24 Hours Medication List:   Current Facility-Administered Medications   Medication Dose Route Frequency Provider Last Rate   • acetaminophen  975 mg Oral Q8H [address] Kell Peters MD     • bumetanide  2 mg Intravenous TID (diuretic) Yara Limb, CRNP     • cyclobenzaprine  5 mg Oral HS Dioria Ahumada, DO     • Diclofenac Sodium  2 g Topical 4x Daily Kell Peters MD     • doxazosin  2 mg Oral HS Dioria Ahumada, DO     • metoprolol succinate  50 mg Oral Daily Dioria Ahumada, DO     • nicotine  1 patch Transdermal Daily Dioria Ahumada, DO     • oxyCODONE  2.5 mg Oral Q6H PRN Kell Peters MD     • pantoprazole  40 mg Oral Daily Dioria Ahumada, DO     • potassium chloride  40 mEq Oral Once Luia Ahumada, DO     • potassium chloride  40 mEq Oral Daily Dioria Ahumada, DO     • rivaroxaban  15 mg Oral QPM Lugenia Ahumada, DO          Today, Patient Was Seen By: Jayden Qureshi DO    **Please Note: This note may have been constructed using a voice recognition system. **

## 2023-08-10 NOTE — ASSESSMENT & PLAN NOTE
Blood Pressure: 136/85  Stable Blood pressures on admission     Plan:  • Hold amlodipine 5 mg daily,   • Continue Metoprolol 50 mg daily, Cardura 2 mg daily at home   • See CHF plan for IV diuresis   • Follow cardiology recommendations

## 2023-08-10 NOTE — PLAN OF CARE
Problem: PAIN - ADULT  Goal: Verbalizes/displays adequate comfort level or baseline comfort level  Description: Interventions:  - Encourage patient to monitor pain and request assistance  - Assess pain using appropriate pain scale  - Administer analgesics based on type and severity of pain and evaluate response  - Implement non-pharmacological measures as appropriate and evaluate response  - Consider cultural and social influences on pain and pain management  - Notify physician/advanced practitioner if interventions unsuccessful or patient reports new pain  Outcome: Progressing     Problem: INFECTION - ADULT  Goal: Absence or prevention of progression during hospitalization  Description: INTERVENTIONS:  - Assess and monitor for signs and symptoms of infection  - Monitor lab/diagnostic results  - Monitor all insertion sites, i.e. indwelling lines, tubes, and drains  - Monitor endotracheal if appropriate and nasal secretions for changes in amount and color  - [place] appropriate cooling/warming therapies per order  - Administer medications as ordered  - Instruct and encourage patient and family to use good hand hygiene technique  - Identify and instruct in appropriate isolation precautions for identified infection/condition  Outcome: Progressing     Problem: DISCHARGE PLANNING  Goal: Discharge to home or other facility with appropriate resources  Description: INTERVENTIONS:  - Identify barriers to discharge w/patient and caregiver  - Arrange for needed discharge resources and transportation as appropriate  - Identify discharge learning needs (meds, wound care, etc.)  - Arrange for interpretive services to assist at discharge as needed  - Refer to Case Management Department for coordinating discharge planning if the patient needs post-hospital services based on physician/advanced practitioner order or complex needs related to functional status, cognitive ability, or social support system  Outcome: Progressing Problem: Knowledge Deficit  Goal: Patient/family/caregiver demonstrates understanding of disease process, treatment plan, medications, and discharge instructions  Description: Complete learning assessment and assess knowledge base.   Interventions:  - Provide teaching at level of understanding  - Provide teaching via preferred learning methods  Outcome: Progressing

## 2023-08-10 NOTE — CONSULTS
Patient stated she is familiar with low sodium diet and avoids added salt. Currently did not eat all lunch as stated did not really care for the wrap. Encouraged diet compliance and suggested call diet office for other lunch options to be sent to her room. Denied need for additional low sodium diet education for CHF at this time.

## 2023-08-10 NOTE — ASSESSMENT & PLAN NOTE
· Noted low potassium 3 on admission. · Patient reports taking potassium solution 15 mg daily however she increased Lasix to 120 mg daily for the past 2 days.   · Potassium repleted with 40 x 2    Plan  · Continue  potassium 40 mg daily with diuresis

## 2023-08-10 NOTE — ASSESSMENT & PLAN NOTE
Lab Results   Component Value Date    EGFR 24 08/10/2023    EGFR 21 08/09/2023    EGFR 19 08/09/2023    CREATININE 2.18 (H) 08/10/2023    CREATININE 2.41 (H) 08/09/2023    CREATININE 2.61 (H) 08/09/2023     · Presents with DANIELA. Suspected to be in the setting of CHF exacerbation. · Creatinine baseline 1.6-2.2.   · Rest as BMP with a.m. labs  · Monitor for I/O's and daily weight  · Avoid nephrotoxic agents and hypoperfusion

## 2023-08-10 NOTE — ASSESSMENT & PLAN NOTE
Wt Readings from Last 3 Encounters:   08/10/23 112 kg (246 lb 0.5 oz)   07/25/23 107 kg (235 lb 9.6 oz)   07/21/23 110 kg (243 lb)       Assessment:   • Presented with SOB with exertion, orthopnea, and bilateral lower extremity edema  • Recently discharged on 7/25 with weight 235 pounds. Reports increasing in weight to 249 lb. was evaluated by heart failure specialist commended Torsemide 60 mg and discharge. • Patient was taking torsemide 120 milligram for the past 2 days  • S/p 1 dose Lasix 120 mg in the ED  • She uses cocaine, last reported in cardiology note on 3/23. Occasional Marijuana. Smoker.    • Last Echo on 4/04/68: EF 65 % Systolic function is normal. Wall motion is normal. Diastolic function is normal     Plan:  • Appreciate Cardiology recommendations- Increase IV Bumex to 2 mg TID + 1 dose of metolazone 2.5 mg x 1 this afternoon, hold amlodipine for adequate diuresis, continue Toprol and Xarelto   • Continue potassium 40 meq daily  • Cardiac diet with sodium and fluid restrictions

## 2023-08-10 NOTE — PLAN OF CARE
Problem: PAIN - ADULT  Goal: Verbalizes/displays adequate comfort level or baseline comfort level  Description: Interventions:  - Encourage patient to monitor pain and request assistance  - Assess pain using appropriate pain scale  - Administer analgesics based on type and severity of pain and evaluate response  - Implement non-pharmacological measures as appropriate and evaluate response  - Consider cultural and social influences on pain and pain management  - Notify physician/advanced practitioner if interventions unsuccessful or patient reports new pain  Outcome: Progressing     Problem: INFECTION - ADULT  Goal: Absence or prevention of progression during hospitalization  Description: INTERVENTIONS:  - Assess and monitor for signs and symptoms of infection  - Monitor lab/diagnostic results  - Monitor all insertion sites, i.e. indwelling lines, tubes, and drains  - Monitor endotracheal if appropriate and nasal secretions for changes in amount and color  - [place] appropriate cooling/warming therapies per order  - Administer medications as ordered  - Instruct and encourage patient and family to use good hand hygiene technique  - Identify and instruct in appropriate isolation precautions for identified infection/condition  Outcome: Progressing     Problem: DISCHARGE PLANNING  Goal: Discharge to home or other facility with appropriate resources  Description: INTERVENTIONS:  - Identify barriers to discharge w/patient and caregiver  - Arrange for needed discharge resources and transportation as appropriate  - Identify discharge learning needs (meds, wound care, etc.)  - Arrange for interpretive services to assist at discharge as needed  - Refer to Case Management Department for coordinating discharge planning if the patient needs post-hospital services based on physician/advanced practitioner order or complex needs related to functional status, cognitive ability, or social support system  Outcome: Progressing Problem: Knowledge Deficit  Goal: Patient/family/caregiver demonstrates understanding of disease process, treatment plan, medications, and discharge instructions  Description: Complete learning assessment and assess knowledge base. Interventions:  - Provide teaching at level of understanding  - Provide teaching via preferred learning methods  Outcome: Progressing     Problem: Nutrition/Hydration-ADULT  Goal: Nutrient/Hydration intake appropriate for improving, restoring or maintaining nutritional needs  Description: Monitor and assess patient's nutrition/hydration status for malnutrition. Collaborate with interdisciplinary team and initiate plan and interventions as ordered. Monitor patient's weight and dietary intake as ordered or per policy. Utilize nutrition screening tool and intervene as necessary. Determine patient's food preferences and provide high-protein, high-caloric foods as appropriate.      INTERVENTIONS:  - Monitor oral intake, urinary output, labs, and treatment plans  - Assess nutrition and hydration status and recommend course of action  - Evaluate amount of meals eaten  - Assist patient with eating if necessary   - Allow adequate time for meals  - Recommend/ encourage appropriate diets, oral nutritional supplements, and vitamin/mineral supplements  - Order, calculate, and assess calorie counts as needed  - Recommend, monitor, and adjust tube feedings and TPN/PPN based on assessed needs  - Assess need for intravenous fluids  - Provide specific nutrition/hydration education as appropriate  - Include patient/family/caregiver in decisions related to nutrition  Outcome: Progressing

## 2023-08-11 PROBLEM — I50.33 ACUTE ON CHRONIC DIASTOLIC CHF (CONGESTIVE HEART FAILURE) (HCC): Chronic | Status: ACTIVE | Noted: 2019-01-23

## 2023-08-11 LAB
ANION GAP SERPL CALCULATED.3IONS-SCNC: 7 MMOL/L
BUN SERPL-MCNC: 27 MG/DL (ref 5–25)
CALCIUM SERPL-MCNC: 8.9 MG/DL (ref 8.4–10.2)
CHLORIDE SERPL-SCNC: 98 MMOL/L (ref 96–108)
CO2 SERPL-SCNC: 32 MMOL/L (ref 21–32)
CREAT SERPL-MCNC: 2.14 MG/DL (ref 0.6–1.3)
ERYTHROCYTE [DISTWIDTH] IN BLOOD BY AUTOMATED COUNT: 14.8 % (ref 11.6–15.1)
GFR SERPL CREATININE-BSD FRML MDRD: 24 ML/MIN/1.73SQ M
GLUCOSE SERPL-MCNC: 95 MG/DL (ref 65–140)
HCT VFR BLD AUTO: 34.9 % (ref 34.8–46.1)
HGB BLD-MCNC: 10.7 G/DL (ref 11.5–15.4)
MAGNESIUM SERPL-MCNC: 2.3 MG/DL (ref 1.9–2.7)
MCH RBC QN AUTO: 25.4 PG (ref 26.8–34.3)
MCHC RBC AUTO-ENTMCNC: 30.7 G/DL (ref 31.4–37.4)
MCV RBC AUTO: 83 FL (ref 82–98)
PLATELET # BLD AUTO: 146 THOUSANDS/UL (ref 149–390)
PMV BLD AUTO: 12.4 FL (ref 8.9–12.7)
POTASSIUM SERPL-SCNC: 4 MMOL/L (ref 3.5–5.3)
RBC # BLD AUTO: 4.22 MILLION/UL (ref 3.81–5.12)
SODIUM SERPL-SCNC: 137 MMOL/L (ref 135–147)
WBC # BLD AUTO: 3.16 THOUSAND/UL (ref 4.31–10.16)

## 2023-08-11 PROCEDURE — 85027 COMPLETE CBC AUTOMATED: CPT

## 2023-08-11 PROCEDURE — 83735 ASSAY OF MAGNESIUM: CPT

## 2023-08-11 PROCEDURE — 99232 SBSQ HOSP IP/OBS MODERATE 35: CPT | Performed by: INTERNAL MEDICINE

## 2023-08-11 PROCEDURE — 80048 BASIC METABOLIC PNL TOTAL CA: CPT

## 2023-08-11 RX ORDER — METOLAZONE 5 MG/1
2.5 TABLET ORAL ONCE
Status: COMPLETED | OUTPATIENT
Start: 2023-08-11 | End: 2023-08-11

## 2023-08-11 RX ADMIN — Medication 2.5 MG: at 11:06

## 2023-08-11 RX ADMIN — DICLOFENAC SODIUM 2 G: 10 GEL TOPICAL at 16:34

## 2023-08-11 RX ADMIN — ACETAMINOPHEN 975 MG: 325 TABLET, FILM COATED ORAL at 13:16

## 2023-08-11 RX ADMIN — Medication 2.5 MG: at 20:18

## 2023-08-11 RX ADMIN — CYCLOBENZAPRINE 5 MG: 10 TABLET, FILM COATED ORAL at 20:19

## 2023-08-11 RX ADMIN — DOXAZOSIN 2 MG: 1 TABLET ORAL at 20:19

## 2023-08-11 RX ADMIN — METOPROLOL SUCCINATE 50 MG: 50 TABLET, EXTENDED RELEASE ORAL at 09:18

## 2023-08-11 RX ADMIN — METOLAZONE 2.5 MG: 5 TABLET ORAL at 12:12

## 2023-08-11 RX ADMIN — IRON SUCROSE 200 MG: 20 INJECTION, SOLUTION INTRAVENOUS at 12:12

## 2023-08-11 RX ADMIN — ACETAMINOPHEN 975 MG: 325 TABLET, FILM COATED ORAL at 05:14

## 2023-08-11 RX ADMIN — BUMETANIDE 2 MG: 0.25 INJECTION INTRAMUSCULAR; INTRAVENOUS at 11:08

## 2023-08-11 RX ADMIN — POTASSIUM CHLORIDE 40 MEQ: 1500 TABLET, EXTENDED RELEASE ORAL at 09:18

## 2023-08-11 RX ADMIN — DICLOFENAC SODIUM 2 G: 10 GEL TOPICAL at 11:07

## 2023-08-11 RX ADMIN — ACETAMINOPHEN 975 MG: 325 TABLET, FILM COATED ORAL at 20:18

## 2023-08-11 RX ADMIN — DICLOFENAC SODIUM 2 G: 10 GEL TOPICAL at 09:18

## 2023-08-11 RX ADMIN — RIVAROXABAN 15 MG: 15 TABLET, FILM COATED ORAL at 16:32

## 2023-08-11 RX ADMIN — DICLOFENAC SODIUM 2 G: 10 GEL TOPICAL at 20:29

## 2023-08-11 RX ADMIN — PANTOPRAZOLE SODIUM 40 MG: 40 TABLET, DELAYED RELEASE ORAL at 09:18

## 2023-08-11 RX ADMIN — BUMETANIDE 2 MG: 0.25 INJECTION INTRAMUSCULAR; INTRAVENOUS at 05:14

## 2023-08-11 RX ADMIN — Medication 2.5 MG: at 03:52

## 2023-08-11 RX ADMIN — BUMETANIDE 2 MG: 0.25 INJECTION INTRAMUSCULAR; INTRAVENOUS at 16:33

## 2023-08-11 NOTE — ASSESSMENT & PLAN NOTE
- Likely in setting of CHF exacerbation  - Cr 2.14   - Creatinine baseline 1.6-2.2.     Plan  - Check BMP in AM  - Monitor for I/O's and daily weight  - Avoid nephrotoxic agents and hypoperfusion

## 2023-08-11 NOTE — ASSESSMENT & PLAN NOTE
- Likely in setting of CHF exacerbation  - Cr 2.37  - Creatinine baseline 1.6-2.2. Plan  - Follow-up outpatient for repeat BMP in 1 week to evaluate kidney function.

## 2023-08-11 NOTE — PROGRESS NOTES
[facility]  Progress Note  Name: Richardson Myers  MRN: [id number]  Unit/Bed#: S -01 I Date of Admission: 8/8/2023   Date of Service: 8/11/2023 I Hospital Day: 2    Assessment/Plan   * Acute on chronic diastolic CHF (congestive heart failure) (HCC)  Assessment & Plan  - Last Echo on 6/30/96: EF 65 % Systolic function is normal. Wall motion is normal. Diastolic function is normal  - SOB with exertion, orthopnea, and BLE edema improved  - Weight today 242 lbs. Recently discharged on 7/25 with weight 235 pounds.     Plan:  - Continue following cardiology recommendations:  - Continue IV Bumex 2 mg TID  - s/p Metolazone 2.5 mg PO x 2  - Hold amlodipine for adequate diuresis, continue Toprol and Xarelto   - Continue PO potassium supplementation 40 mEq qd   - Cardiac diet with sodium and fluid restrictions    Acute kidney injury superimposed on CKD ([address])  Assessment & Plan  - Likely in setting of CHF exacerbation  - Cr 2.14   - Creatinine baseline 1.6-2.2. Plan  - Check BMP in AM  - Monitor for I/O's and daily weight  - Avoid nephrotoxic agents and hypoperfusion    Hypokalemia  Assessment & Plan  - K 4.0    Plan  - Continue PO potassium 40 mEq daily with diuresis    Essential hypertension  Assessment & Plan  116/73     Plan:  • Hold amlodipine 5 mg daily   • Continue Metoprolol 50 mg daily, Cardura 2 mg daily at home   • See CHF plan for IV diuresis   • Follow cardiology recommendations    Paroxysmal A-fib [facility])  Assessment & Plan  • S/p AVJ ablation with ventricle pacemaker.  Paced rhythm @70. • Xarelto 15 mg daily. Anemia  Assessment & Plan  5/29/23 - iron 22, ferritin 64, iron saturation 5, TIBC 420, folate 15.9    Plan:  - Start 200 mg iron IV x 3 days    Tobacco abuse  Assessment & Plan  Assessment:  • Smokes currently 1/2 ppd.  She has been a smoker for 43 years      Plan:  • Smoking cessation was recommended  • Nicotine replacement was provided         VTE Pharmacologic Prophylaxis: VTE Score: 3 Moderate Risk (Score 3-4) - Pharmacological DVT Prophylaxis Ordered: rivaroxaban (Xarelto). Patient Centered Rounds: I performed bedside rounds with nursing staff today. Discussions with Specialists or Other Care Team Provider: Cardiology, nutrition services    Education and Discussions with Family / Patient: Patient to update . Current Length of Stay: 2 day(s)  Current Patient Status: Inpatient   Discharge Plan: Anticipate discharge in 24-48 hrs to home. Code Status: Level 1 - Full Code    Subjective:   Patient seen and evaluated at bedside. Does not have any chest pain or shortness of breath. In no distress, watching TV. States that she was having LUQ and LLQ pain intermittently, the same as yesterday, which resolved after having a bowel movement today. Denies nausea, vomiting, diarrhea, fever, chills, palpitations. Objective:     Vitals:   Temp (24hrs), Av.9 °F (36.6 °C), Min:97.5 °F (36.4 °C), Max:98.8 °F (37.1 °C)    Temp:  [97.5 °F (36.4 °C)-98.8 °F (37.1 °C)] 97.5 °F (36.4 °C)  HR:  [69-70] 70  Resp:  [16-21] 16  BP: (116-132)/(72-81) 116/73  SpO2:  [96 %] 96 %  Body mass index is 37.98 kg/m². Input and Output Summary (last 24 hours): Intake/Output Summary (Last 24 hours) at 2023 1503  Last data filed at 2023 1215  Gross per 24 hour   Intake 220 ml   Output 4160 ml   Net -3940 ml       Physical Exam:   Physical Exam  Vitals and nursing note reviewed. Constitutional:       General: She is not in acute distress. Appearance: She is well-developed. HENT:      Head: Normocephalic and atraumatic. Right Ear: External ear normal.      Left Ear: External ear normal.      Nose: Nose normal.   Eyes:      Extraocular Movements: Extraocular movements intact. Conjunctiva/sclera: Conjunctivae normal.      Pupils: Pupils are equal, round, and reactive to light.       Visual Fields: Right eye visual fields normal and left eye visual fields normal.   Cardiovascular:      Rate and Rhythm: Normal rate and regular rhythm. Heart sounds: No murmur heard. Pulmonary:      Effort: Pulmonary effort is normal. No respiratory distress. Breath sounds: Normal breath sounds. No wheezing, rhonchi or rales. Abdominal:      General: Bowel sounds are normal.      Palpations: Abdomen is soft. Tenderness: There is abdominal tenderness (mild LLQ, LUQ. Less compared yesterday). Musculoskeletal:         General: No swelling. Cervical back: Neck supple. Right lower leg: No edema. Left lower leg: No edema. Skin:     General: Skin is warm and dry. Neurological:      Mental Status: She is alert. Cranial Nerves: Cranial nerves 2-12 are intact. Sensory: Sensation is intact. Motor: Motor function is intact. Psychiatric:         Mood and Affect: Mood normal.         Speech: Speech normal.         Behavior: Behavior normal. Behavior is cooperative. Additional Data:     Labs:  Results from last 7 days   Lab Units 08/11/23  0459 08/10/23  1052 08/09/23  0028   WBC Thousand/uL 3.16*   < > 4.82   HEMOGLOBIN g/dL 10.7*   < > 9.9*   HEMATOCRIT % 34.9   < > 33.1*   PLATELETS Thousands/uL 146*   < > 146*   NEUTROS PCT %  --   --  70   LYMPHS PCT %  --   --  21   MONOS PCT %  --   --  7   EOS PCT %  --   --  2    < > = values in this interval not displayed. Results from last 7 days   Lab Units 08/11/23  0459 08/09/23  0826 08/09/23  0028   SODIUM mmol/L 137   < > 140   POTASSIUM mmol/L 4.0   < > 3.0*   CHLORIDE mmol/L 98   < > 102   CO2 mmol/L 32   < > 30   BUN mg/dL 27*   < > 29*   CREATININE mg/dL 2.14*   < > 2.61*   ANION GAP mmol/L 7   < > 8   CALCIUM mg/dL 8.9   < > 8.7   ALBUMIN g/dL  --   --  3.9   TOTAL BILIRUBIN mg/dL  --   --  0.28   ALK PHOS U/L  --   --  74   ALT U/L  --   --  14   AST U/L  --   --  18   GLUCOSE RANDOM mg/dL 95   < > 96    < > = values in this interval not displayed. Lines/Drains:  Invasive Devices     Peripheral Intravenous Line  Duration           Peripheral IV 08/09/23 Right Antecubital 2 days                  Telemetry:  Telemetry Orders (From admission, onward)             24 Hour Telemetry Monitoring  Continuous x 24 Hours (Telem)        Question:  Reason for 24 Hour Telemetry  Answer:  Decompensated CHF- and any one of the following: continuous diuretic infusion or total diuretic dose >200 mg daily, associated electrolyte derangement (I.e. K < 3.0), ionotropic drip (continuous infusion), hx of ventricular arrhythmia, or new EF < 35%                 Telemetry Reviewed: Normal Sinus Rhythm paced  Indication for Continued Telemetry Use: Acute CHF on >200 mg lasix/day or equivalent dose or with new reduced EF.               Imaging: Reviewed radiology reports from this admission including: chest xray and abdominal/pelvic CT    Recent Cultures (last 7 days):   Results from last 7 days   Lab Units 08/09/23  0039   URINE CULTURE  40,000-49,000 cfu/ml       Last 24 Hours Medication List:   Current Facility-Administered Medications   Medication Dose Route Frequency Provider Last Rate   • acetaminophen  975 mg Oral Q8H [address] Live Castro MD     • bumetanide  2 mg Intravenous TID (diuretic) CHARLOTTE Draper     • cyclobenzaprine  5 mg Oral HS Michell Itta Bena, DO     • Diclofenac Sodium  2 g Topical 4x Daily Live Castro MD     • doxazosin  2 mg Oral HS Michell Itta Bena, DO     • iron sucrose  200 mg Intravenous Daily Live Castro MD     • metoprolol succinate  50 mg Oral Daily Michell Itta Bena, DO     • nicotine  1 patch Transdermal Daily Michell Itta Bena, DO     • oxyCODONE  2.5 mg Oral Q6H PRN Live Castro MD     • pantoprazole  40 mg Oral Daily Michell Itta Bena, DO     • potassium chloride  40 mEq Oral Once Michell Itta Bena, DO     • potassium chloride  40 mEq Oral Daily Michell Itta Bena, DO     • rivaroxaban  15 mg Oral QPM Michell Itta Bena, DO          Today, Patient Was Seen By: Rachelle Chang DO    **Please Note: This note may have been constructed using a voice recognition system. **

## 2023-08-11 NOTE — QUICK NOTE
I have seen and examined the patient. I agree with progress note by internal medicine resident Dr. Joseluis Muhammad DO. I have independently reviewed history, chart, labs, notes, medications and imaging studies    Comfortably sitting up in bed. Reports feeling better today. Reports ports improving dyspnea. Ambulating in the room. Encourage incentive spirometry    Vitals:    08/10/23 2129 08/11/23 0500 08/11/23 0600 08/11/23 0711   BP: 132/81 123/72  116/73   BP Location: Left arm Left arm  Left arm   Pulse: 69 70  70   Resp: 21 16     Temp: 97.7 °F (36.5 °C) 97.5 °F (36.4 °C)  97.5 °F (36.4 °C)   TempSrc: Oral Oral  Oral   SpO2: 96%   96%   Weight:   110 kg (242 lb 8.1 oz)    Height:         Comfortably sitting up in bed.   Obese, Short thick neck, lungs diminished breath sounds bilaterally, heart sounds S1 and S2 noted, abdomen soft, awake obey simple commands, no rash    A/P    · Acute on chronic diastolic congestive heart failure IV Bumex per cardiology, continue metoprolol, monitor I's/O, daily weights, less than 2 g salt diet, cardiology following  · Acute on chronic disease stage III monitor kidney function, avoid nephrotoxins, monitor postvoid residuals  · Paroxysmal atrial fibrillation continue Toprol, Xarelto for anticoagulation  · Hypertension monitor blood pressures, avoid hypotension  · Tobacco abuse tobacco cessation discussed and encouraged  · Chronic anemia, thrombocytopenia mild leukopenia chart reviewed hepatitis C positive, outpatient follow-up  · Out of bed into a chair as able, ambulation as able      Tariq Cleaning MD minimum assist (75% patients effort)

## 2023-08-11 NOTE — ASSESSMENT & PLAN NOTE
116/73     Plan:  • Hold amlodipine 5 mg daily   • Continue Metoprolol 50 mg daily, Cardura 2 mg daily at home   • See CHF plan for IV diuresis   • Follow cardiology recommendations

## 2023-08-11 NOTE — PROGRESS NOTES
General Cardiology   Progress Note -  Team One   Navdeep Longo 62 y.o. female MRN: [id number]    Unit/Bed#: S -01 Encounter: [id number]    Assessment  1. Acute on chronic diastolic CHF exacerbation  -Recurrent issue.   -Acute etiology - likely 2/2 ineffective diuretic dosing, pt expresses compliance with diuretic therapy as well as dietary/fluid restrictions.   2. Hx of recovered NICM - tachy-mediated   -BNP level 539, previously 272 on 7/22/2023.  -X-ray PA/lateral - mild pulmonary vascular congestion.  -Limited TTE 5/1974; EF 65, diastolic function normal, wall motion normal, RV normal size/systolic function, trace MR. Trivial pericardial effusion anterior to the heart.  -Outpatient GDMT; metoprolol succinate 50 mg daily; not previously on ARNI/ACE/ARB, aldosterone antagonist or SG L T2 inhibitor in the setting of advanced CKD. -Outpatient diuretic regimen; torsemide 60 mg daily --pt increased her torsemide to 60 mg twice daily 3 days prior to her admission.  -Inpatient diuretic regimen; IV Bumex 2 mg twice daily  -24 hr I&O balance; -4.8L; overall -6.1L  -Approximate dry weight around 234-235 lbs; recent discharge weight was 235 pounds on 7/25.  Recent weight at home 246 pounds.  SS weight 242 lbs today. 2. History of monomorphic VT  s/p MDT DC AICD in situ (since 07/2016)  -Interrogation from  6/19/2023: AP 0%.  98%. Lead parameters WNL. AF burden 41.1%. OptiVol WNL. Normal device function.   3. History of persistent atrial flutter/atrial fibrillation s/p AVJ ablation (5/2023)  -S/p unspecified ablation at [facility] in 2015.  -S/p Afib ablation with PVI in 5/2020.  -S/p atypical flutter and micro-reentrant atrial tachycardia ablation in 6/2022  -Status post AV miguel ablation procedure 5/2023  -Telemetry review - V paced - rate 70 bpm  -[id number] = 3 (sex, HF, HTN)  -On metoprolol succinate 50 mg daily  -On Xarelto 15 mg daily.   4. Hypertension  -Avg /76, last recorded 116/73, HR 80.  -Outpatient antihypertensive regimen - amlodipine 5 mg daily, doxazosin 2 mg daily, and metoprolol succinate 50 mg daily daily  -Inpatient BP regimen; doxazosin 2 mg daily, metoprolol succinate 50 mg daily  5. Hyperlipidemia  -Lipid panel (4/5/2023) - /triglycerides 83/HDL 40/LDL 68  -Not maintained on statin therapy outpatient  6. CKD stage IV  -Follows with nephrology as an outpatient.  -Baseline creatinine 1.7-2.1.  -Creatinine 2.61 on admission, currently 2.14  7. Nicotine dependence.  -Longstanding history of.  Currently smoking half a pack per day. 8. History of cocaine use  9. Marijuana use: smoking "1 drag of a joint" at night to help her sleep.   10. SURINDER - CPAP at      Plan  -Significant improvement in volume/respiratory status over the past 24 hours. She had a robust urinary response with increased frequency of IV Bumex +1 dose of metolazone yesterday. She still examines with at least mild volume overload and weight is still approximately 5 to 7 pounds above her dry weight. Renal function and electrolytes remained stable. Would continue IV Bumex to 2 mg TID today and hopefully be able to transition to oral diuretics in the a.m.  -Hold amlodipine -- to allow more room in BP for adequate diuresis.  Can continue metoprolol succinate 50 mg daily  -ARNI/ACEi/ARB, aldosterone antagonist or SGLT2 inhibitor precluded in the setting of advanced CKD. -Continue Xarelto for systemic AC  -Smoking cessation advised.  Nicotine patch offered.  -Strict I&O monitoring, daily standing weights, 2 g Na+ diet 1.8 LFR.  -Monitor renal function electrolytes closely.  Replete to maintain K+ level 4.0 magnesium level 2.0.  -Monitor on telemetry - with ongoing aggressive IV diuresis given history of electrolyte derangements as well as ventricular arrhythmias.     Subjective  Review of Systems   Constitutional: Negative for chills, fever and malaise/fatigue. Eyes: Negative for visual disturbance. Cardiovascular: Negative for chest pain, dyspnea on exertion, leg swelling, orthopnea and palpitations. Respiratory: Negative for cough and shortness of breath. Gastrointestinal: Negative for abdominal pain. Neurological: Negative for dizziness, headaches and light-headedness. Objective:   Physical Exam  Vitals and nursing note reviewed. Constitutional:       General: She is not in acute distress. Appearance: She is obese. She is not diaphoretic. HENT:      Head: Normocephalic and atraumatic. Eyes:      General: No scleral icterus. Neck:      Comments: jvd  Cardiovascular:      Rate and Rhythm: Normal rate. Pulses: Normal pulses. Heart sounds: Normal heart sounds. Pulmonary:      Effort: Pulmonary effort is normal.      Breath sounds: No wheezing or rales. Abdominal:      Palpations: Abdomen is soft. Musculoskeletal:      Right lower leg: Edema present. Left lower leg: Edema present. Skin:     General: Skin is warm and dry. Capillary Refill: Capillary refill takes less than 2 seconds. Neurological:      General: No focal deficit present. Mental Status: She is alert and oriented to person, place, and time. Psychiatric:         Mood and Affect: Mood normal.         Vitals: Blood pressure 116/73, pulse 70, temperature 97.5 °F (36.4 °C), temperature source Oral, resp. rate 16, height 5' 7" (1.702 m), weight 110 kg (242 lb 8.1 oz), SpO2 96 %, not currently breastfeeding.,     Body mass index is 37.98 kg/m². ,   Systolic (00GGP), VQS:558 , Min:108 , IXU:183     Diastolic (85IAP), LFU:46, Min:72, Max:85      Intake/Output Summary (Last 24 hours) at 8/11/2023 0903  Last data filed at 8/11/2023 0430  Gross per 24 hour   Intake 0 ml   Output 4750 ml   Net -4750 ml     Weight (last 2 days)     Date/Time Weight    08/11/23 0600 110 (242.51)    08/10/23 0600 112 (246.03)    08/09/23 1457 113 (249)          LABORATORY RESULTS      CBC with diff:   Results from last 7 days   Lab Units 08/11/23  0459 08/10/23  1052 08/09/23  0028   WBC Thousand/uL 3.16* 3.82* 4.82   HEMOGLOBIN g/dL 10.7* 10.4* 9.9*   HEMATOCRIT % 34.9 34.3* 33.1*   MCV fL 83 84 85   PLATELETS Thousands/uL 146* 141* 146*   RBC Million/uL 4.22 4.09 3.90   MCH pg 25.4* 25.4* 25.4*   MCHC g/dL 30.7* 30.3* 29.9*   RDW % 14.8 14.9 15.1   MPV fL 12.4 11.1 12.2   NRBC AUTO /100 WBCs  --   --  0       CMP:  Results from last 7 days   Lab Units 08/11/23  0459 08/10/23  1052 08/09/23  0826 08/09/23  0028   POTASSIUM mmol/L 4.0 3.9 4.8 3.0*   CHLORIDE mmol/L 98 102 103 102   CO2 mmol/L 32 30 28 30   BUN mg/dL 27* 24 26* 29*   CREATININE mg/dL 2.14* 2.18* 2.41* 2.61*   CALCIUM mg/dL 8.9 8.7 8.2* 8.7   AST U/L  --   --   --  18   ALT U/L  --   --   --  14   ALK PHOS U/L  --   --   --  74   EGFR ml/min/1.73sq m 24 24 21 19       BMP:  Results from last 7 days   Lab Units 08/11/23  0459 08/10/23  1052 08/09/23  0826 08/09/23  0028   POTASSIUM mmol/L 4.0 3.9 4.8 3.0*   CHLORIDE mmol/L 98 102 103 102   CO2 mmol/L 32 30 28 30   BUN mg/dL 27* 24 26* 29*   CREATININE mg/dL 2.14* 2.18* 2.41* 2.61*   CALCIUM mg/dL 8.9 8.7 8.2* 8.7       Lab Results   Component Value Date    NTBNP 9,053 (H) 01/13/2023    NTBNP 5,423 (H) 01/04/2023    NTBNP 16,909 (H) 04/07/2022        Results from last 7 days   Lab Units 08/11/23  0459 08/10/23  1052 08/09/23  0826   MAGNESIUM mg/dL 2.3 2.1 2.2                         Lipid Profile:   No results found for: "CHOL"  Lab Results   Component Value Date    HDL 40 (L) 04/05/2023    HDL 42 (L) 02/07/2021    HDL 43 09/17/2020     Lab Results   Component Value Date    LDLCALC 68 04/05/2023    LDLCALC 57 02/07/2021    LDLCALC 72 09/17/2020     Lab Results   Component Value Date    TRIG 83 04/05/2023    TRIG 80.9 02/07/2021    TRIG 89 09/17/2020       Cardiac testing:   Results for orders placed during the hospital encounter of 05/24/21    Echo complete with contrast if indicated    Sheri Fregoso [address], [address] [place] [address]    Transthoracic Echocardiogram  2D, M-mode, Doppler, and Color Doppler    Study date:  25-May-2021    Patient: Andre Sarmiento  MR number: [id number]  Account number: [de-identified]  : 1965  Age: 64 years  Gender: Female  Status: Inpatient  Location: [facility]  Height: 67 in  Weight: 212 lb  BP: 118/ 62 mmHg    Indications: Afib    Diagnoses: I48.0 - Atrial fibrillation    Sonographer:  PRISCILLA Villeda  Referring Physician:  Dequan Gallardo MD  Group:  [facility]  Interpreting Physician:  Sherryle Reading, MD    SUMMARY    LEFT VENTRICLE:  Systolic function was moderately to markedly reduced. Ejection fraction was estimated to be 30 %. There was moderate diffuse hypokinesis. There was severe concentric hypertrophy. There was significant hypertrophy of the LV apex. RIGHT VENTRICLE:  The size was normal.  Systolic function was reduced. LEFT ATRIUM:  The atrium was dilated. HISTORY: PRIOR HISTORY: Cocaine abuse, Smoker, CKD III, Congestive heart failure. Hypertrophic cardiomyopathy. Atrial fibrillation. Risk factors: hypercholesterolemia. PRIOR PROCEDURES: ICD implantation. Arrhythmia ablation. PROCEDURE: The study was performed in the [facility]. This was a routine study. The transthoracic approach was used. The study included complete 2D imaging, M-mode, complete spectral Doppler, and color Doppler. The heart rate was 138  bpm, at the start of the study. Images were obtained from the parasternal, apical, subcostal, and suprasternal notch acoustic windows. Intravenous contrast ( .6 mL Definity in NSS) was administered. Echocardiographic views were limited due  to poor acoustic window availability and lung interference. This was a technically difficult study. LEFT VENTRICLE: Size was normal. Systolic function was moderately to markedly reduced. Ejection fraction was estimated to be 30 %.  There was moderate diffuse hypokinesis. Wall thickness was markedly increased. There was severe concentric  hypertrophy. There was significant hypertrophy of the LV apex. DOPPLER: Due to tachycardia, there was fusion of early and atrial contributions to ventricular filling. The study was not technically sufficient to allow evaluation of LV  diastolic function. RIGHT VENTRICLE: The size was normal. Systolic function was reduced. Wall thickness was normal. A pacing wire was present. LEFT ATRIUM: The atrium was dilated. RIGHT ATRIUM: Size was normal. A pacing wire was present. MITRAL VALVE: Valve structure was normal. There was normal leaflet separation. DOPPLER: The transmitral velocity was within the normal range. There was no evidence for stenosis. There was trace regurgitation. AORTIC VALVE: The valve was trileaflet. Leaflets exhibited normal thickness and normal cuspal separation. DOPPLER: Transaortic velocity was within the normal range. There was no evidence for stenosis. There was no significant  regurgitation. TRICUSPID VALVE: The valve structure was normal. There was normal leaflet separation. DOPPLER: The transtricuspid velocity was within the normal range. There was no evidence for stenosis. There was trace regurgitation. Pulmonary artery  systolic pressure was within the normal range. Estimated peak PA pressure was 21 mmHg. PULMONIC VALVE: Leaflets exhibited normal thickness, no calcification, and normal cuspal separation. DOPPLER: The transpulmonic velocity was within the normal range. There was trace regurgitation. PERICARDIUM: There was no pericardial effusion. The pericardium was normal in appearance. AORTA: The root exhibited normal size. SYSTEMIC VEINS: IVC: The inferior vena cava was normal in size.  Respirophasic changes were normal.    SYSTEM MEASUREMENT TABLES    2D  %FS: 27.49 %  Ao Diam: 2.77 cm  EDV(Teich): 57.94 ml  EF(Teich): 54.26 %  ESV(Teich): 26.5 ml  IVSd: 2.46 cm  LA Area: 26.06 cm2  LA Diam: 4.27 cm  LVIDd: 3.7 cm  LVIDs: 2.68 cm  LVPWd: 1.79 cm  RA Area: 18.49 cm2  RVIDd: 3.01 cm  RWT: 0.97  SV(Teich): 31.44 ml    CW  TR Vmax: 2.14 m/s  TR maxP.28 mmHg    MM  TAPSE: 1.51 cm    Inters\[facility]ology [facility] [place] [facility]\"" Commission Accredited Echocardiography Laboratory    Prepared and electronically signed by    Mary Ellen Trevizo MD  Signed [id number] 14:44:53    Results for orders placed during the hospital encounter of 20    HUBER    Narrative  [id number] [facility] [address] [place] [address],  [facility]  [phone]    Transesophageal Echocardiogram  2D, Doppler, and Color Doppler    Study date:  20-May-2020    Patient: Juanpablo Medrano  MR number: [id number]  Account number: [de-identified]  : 1965  Age: 54 years  Gender: Female  Status: Outpatient  Location: Cath lab  Height: 67 in  Weight: 221 lb  BP:    Indications: AFIB    Diagnoses: I48.0 - Atrial fibrillation    Sonographer:  PRISCILLA Barnard  Interpreting Physician:  Mayur Harkins MD  Primary Physician:  Kassidy Rodgers MD  Referring Physician:  Mayur Harkins MD  Group:  [facility]    SUMMARY    LEFT VENTRICLE:  Systolic function was normal. Ejection fraction was estimated to be 60 %. Wall thickness was moderately increased. There was moderate concentric hypertrophy. LEFT ATRIUM:  The atrium was mildly dilated. LEFT ATRIAL APPENDAGE:  The size was normal.  The function was normal (normal emptying velocity). No thrombus was identified. ATRIAL SEPTUM:  No defect or patent foramen ovale was identified. HISTORY: PRIOR HISTORY: AFIB, PPM, MI, HOCM, HTN, HLD, CKD III, Smoker, Cocaine abuse    PROCEDURE: The procedure was performed in the catheterization laboratory. This was a routine study. The risks and alternatives of the procedure were explained to the patient and informed consent was obtained. The transesophageal approach  was used.  The study included complete 2D imaging, complete spectral Doppler, and color Doppler. An adult omniplane probe was inserted by the attending cardiologist. Intubated with ease. One intubation attempt(s). There was no blood  detected on the probe. Image quality was adequate. MEDICATIONS: Anesthesia. LEFT VENTRICLE: Size was normal. Systolic function was normal. Ejection fraction was estimated to be 60 %. Wall thickness was moderately increased. There was moderate concentric hypertrophy. RIGHT VENTRICLE: The size was normal. Systolic function was normal. Wall thickness was normal. A pacing wire was present. LEFT ATRIUM: The atrium was mildly dilated. No mass was present. There was no spontaneous echo contrast ("smoke"). APPENDAGE: The size was normal. No thrombus was identified. DOPPLER: The function was normal (normal emptying velocity). ATRIAL SEPTUM: No defect or patent foramen ovale was identified. RIGHT ATRIUM: Size was normal. No thrombus was identified. MITRAL VALVE: Valve structure was normal. There was normal leaflet separation. There was no echocardiographic evidence of vegetation. DOPPLER: There was no regurgitation. AORTIC VALVE: The valve was trileaflet. Leaflets exhibited normal thickness and normal cuspal separation. There was no echocardiographic evidence of vegetation. DOPPLER: There was no regurgitation. TRICUSPID VALVE: The valve structure was normal. There was normal leaflet separation. There was no echocardiographic evidence of vegetation. DOPPLER: There was no regurgitation. PERICARDIUM: There was no pericardial effusion. The pericardium was normal in appearance. [facility] Accredited Echocardiography Laboratory    Prepared and electronically signed by    Carmen Houston MD  Signed [id number] 09:25:57    No results found for this or any previous visit. No valid procedures specified. No results found for this or any previous visit.       Meds/Allergies   all current active meds have been reviewed and current meds:   Current Facility-Administered Medications   Medication Dose Route Frequency   • acetaminophen (TYLENOL) tablet 975 mg  975 mg Oral Q8H [address]   • bumetanide (BUMEX) injection 2 mg  2 mg Intravenous TID (diuretic)   • cyclobenzaprine (FLEXERIL) tablet 5 mg  5 mg Oral HS   • Diclofenac Sodium (VOLTAREN) 1 % topical gel 2 g  2 g Topical 4x Daily   • doxazosin (CARDURA) tablet 2 mg  2 mg Oral HS   • metoprolol succinate (TOPROL-XL) 24 hr tablet 50 mg  50 mg Oral Daily   • nicotine (NICODERM CQ) 14 mg/24hr TD 24 hr patch 1 patch  1 patch Transdermal Daily   • oxyCODONE (ROXICODONE) split tablet 2.5 mg  2.5 mg Oral Q6H PRN   • pantoprazole (PROTONIX) EC tablet 40 mg  40 mg Oral Daily   • potassium chloride (K-DUR,KLOR-CON) CR tablet 40 mEq  40 mEq Oral Once   • potassium chloride (K-DUR,KLOR-CON) CR tablet 40 mEq  40 mEq Oral Daily   • rivaroxaban (XARELTO) tablet 15 mg  15 mg Oral QPM          EKG personally reviewed by CHARLOTTE Gerard. Assessment:  Principal Problem:    Acute on chronic diastolic CHF (congestive heart failure) (HCC)  Active Problems:    Tobacco abuse    Paroxysmal A-fib (HCC)    Essential hypertension    Acute kidney injury superimposed on CKD ([address])    Hypokalemia      Counseling / Coordination of Care  Total floor / unit time spent today 20 minutes. Greater than 50% of total time was spent with the patient and / or family counseling and / or coordination of care. ** Please Note: Dragon 360 Dictation voice to text software may have been used in the creation of this document.  **

## 2023-08-11 NOTE — ASSESSMENT & PLAN NOTE
Assessment:  • Smokes currently 1/2 ppd.  She has been a smoker for 43 years      Plan:  • Smoking cessation was recommended  • Nicotine replacement was provided

## 2023-08-11 NOTE — PLAN OF CARE
Problem: PAIN - ADULT  Goal: Verbalizes/displays adequate comfort level or baseline comfort level  Description: Interventions:  - Encourage patient to monitor pain and request assistance  - Assess pain using appropriate pain scale  - Administer analgesics based on type and severity of pain and evaluate response  - Implement non-pharmacological measures as appropriate and evaluate response  - Consider cultural and social influences on pain and pain management  - Notify physician/advanced practitioner if interventions unsuccessful or patient reports new pain  Outcome: Progressing     Problem: INFECTION - ADULT  Goal: Absence or prevention of progression during hospitalization  Description: INTERVENTIONS:  - Assess and monitor for signs and symptoms of infection  - Monitor lab/diagnostic results  - Monitor all insertion sites, i.e. indwelling lines, tubes, and drains  - Monitor endotracheal if appropriate and nasal secretions for changes in amount and color  - [place] appropriate cooling/warming therapies per order  - Administer medications as ordered  - Instruct and encourage patient and family to use good hand hygiene technique  - Identify and instruct in appropriate isolation precautions for identified infection/condition  Outcome: Progressing     Problem: DISCHARGE PLANNING  Goal: Discharge to home or other facility with appropriate resources  Description: INTERVENTIONS:  - Identify barriers to discharge w/patient and caregiver  - Arrange for needed discharge resources and transportation as appropriate  - Identify discharge learning needs (meds, wound care, etc.)  - Arrange for interpretive services to assist at discharge as needed  - Refer to Case Management Department for coordinating discharge planning if the patient needs post-hospital services based on physician/advanced practitioner order or complex needs related to functional status, cognitive ability, or social support system  Outcome: Progressing Problem: Knowledge Deficit  Goal: Patient/family/caregiver demonstrates understanding of disease process, treatment plan, medications, and discharge instructions  Description: Complete learning assessment and assess knowledge base. Interventions:  - Provide teaching at level of understanding  - Provide teaching via preferred learning methods  Outcome: Progressing     Problem: Nutrition/Hydration-ADULT  Goal: Nutrient/Hydration intake appropriate for improving, restoring or maintaining nutritional needs  Description: Monitor and assess patient's nutrition/hydration status for malnutrition. Collaborate with interdisciplinary team and initiate plan and interventions as ordered. Monitor patient's weight and dietary intake as ordered or per policy. Utilize nutrition screening tool and intervene as necessary. Determine patient's food preferences and provide high-protein, high-caloric foods as appropriate.      INTERVENTIONS:  - Monitor oral intake, urinary output, labs, and treatment plans  - Assess nutrition and hydration status and recommend course of action  - Evaluate amount of meals eaten  - Assist patient with eating if necessary   - Allow adequate time for meals  - Recommend/ encourage appropriate diets, oral nutritional supplements, and vitamin/mineral supplements  - Order, calculate, and assess calorie counts as needed  - Recommend, monitor, and adjust tube feedings and TPN/PPN based on assessed needs  - Assess need for intravenous fluids  - Provide specific nutrition/hydration education as appropriate  - Include patient/family/caregiver in decisions related to nutrition  Outcome: Progressing

## 2023-08-11 NOTE — PLAN OF CARE
Problem: PAIN - ADULT  Goal: Verbalizes/displays adequate comfort level or baseline comfort level  Description: Interventions:  - Encourage patient to monitor pain and request assistance  - Assess pain using appropriate pain scale  - Administer analgesics based on type and severity of pain and evaluate response  - Implement non-pharmacological measures as appropriate and evaluate response  - Consider cultural and social influences on pain and pain management  - Notify physician/advanced practitioner if interventions unsuccessful or patient reports new pain  Outcome: Progressing     Problem: INFECTION - ADULT  Goal: Absence or prevention of progression during hospitalization  Description: INTERVENTIONS:  - Assess and monitor for signs and symptoms of infection  - Monitor lab/diagnostic results  - Monitor all insertion sites, i.e. indwelling lines, tubes, and drains  - Monitor endotracheal if appropriate and nasal secretions for changes in amount and color  - [place] appropriate cooling/warming therapies per order  - Administer medications as ordered  - Instruct and encourage patient and family to use good hand hygiene technique  - Identify and instruct in appropriate isolation precautions for identified infection/condition  Outcome: Progressing     Problem: DISCHARGE PLANNING  Goal: Discharge to home or other facility with appropriate resources  Description: INTERVENTIONS:  - Identify barriers to discharge w/patient and caregiver  - Arrange for needed discharge resources and transportation as appropriate  - Identify discharge learning needs (meds, wound care, etc.)  - Arrange for interpretive services to assist at discharge as needed  - Refer to Case Management Department for coordinating discharge planning if the patient needs post-hospital services based on physician/advanced practitioner order or complex needs related to functional status, cognitive ability, or social support system  Outcome: Progressing Problem: Knowledge Deficit  Goal: Patient/family/caregiver demonstrates understanding of disease process, treatment plan, medications, and discharge instructions  Description: Complete learning assessment and assess knowledge base. Interventions:  - Provide teaching at level of understanding  - Provide teaching via preferred learning methods  Outcome: Progressing     Problem: Nutrition/Hydration-ADULT  Goal: Nutrient/Hydration intake appropriate for improving, restoring or maintaining nutritional needs  Description: Monitor and assess patient's nutrition/hydration status for malnutrition. Collaborate with interdisciplinary team and initiate plan and interventions as ordered. Monitor patient's weight and dietary intake as ordered or per policy. Utilize nutrition screening tool and intervene as necessary. Determine patient's food preferences and provide high-protein, high-caloric foods as appropriate.      INTERVENTIONS:  - Monitor oral intake, urinary output, labs, and treatment plans  - Assess nutrition and hydration status and recommend course of action  - Evaluate amount of meals eaten  - Assist patient with eating if necessary   - Allow adequate time for meals  - Recommend/ encourage appropriate diets, oral nutritional supplements, and vitamin/mineral supplements  - Order, calculate, and assess calorie counts as needed  - Recommend, monitor, and adjust tube feedings and TPN/PPN based on assessed needs  - Assess need for intravenous fluids  - Provide specific nutrition/hydration education as appropriate  - Include patient/family/caregiver in decisions related to nutrition  Outcome: Progressing

## 2023-08-11 NOTE — ASSESSMENT & PLAN NOTE
5/29/23 - iron 22, ferritin 64, iron saturation 5, TIBC 420, folate 15.9    Plan:  - Rx for ferrous sulfate tablets given

## 2023-08-11 NOTE — ASSESSMENT & PLAN NOTE
- SOB with exertion, orthopnea, and BLE edema improved  - Echo on 5/12/23 - EF 65%. Systolic function is normal. Wall motion is normal. Diastolic function is normal  - Echo on 8/9/23 - EF 70%. Normal systolic function. Moderate asymmetric hypertrophy of apical and septal wall of the LV. Wall thickness is moderately increased. - Weight today 239 lbs.  Recently discharged on 7/25/23 with weight 235 pounds.     Plan:  - Received IV Bumex during her stay for diuresis  - Transitioned to p.o. torsemide 40 mg daily, continue upon discharge  - Continue Metolazone 2.5 mg a week  - Continue metoprolol 50 mg qd  - Follow-up outpatient with cardiology

## 2023-08-11 NOTE — PLAN OF CARE
Problem: PAIN - ADULT  Goal: Verbalizes/displays adequate comfort level or baseline comfort level  Description: Interventions:  - Encourage patient to monitor pain and request assistance  - Assess pain using appropriate pain scale  - Administer analgesics based on type and severity of pain and evaluate response  - Implement non-pharmacological measures as appropriate and evaluate response  - Consider cultural and social influences on pain and pain management  - Notify physician/advanced practitioner if interventions unsuccessful or patient reports new pain  Outcome: Progressing     Problem: INFECTION - ADULT  Goal: Absence or prevention of progression during hospitalization  Description: INTERVENTIONS:  - Assess and monitor for signs and symptoms of infection  - Monitor lab/diagnostic results  - Monitor all insertion sites, i.e. indwelling lines, tubes, and drains  - Monitor endotracheal if appropriate and nasal secretions for changes in amount and color  - [place] appropriate cooling/warming therapies per order  - Administer medications as ordered  - Instruct and encourage patient and family to use good hand hygiene technique  - Identify and instruct in appropriate isolation precautions for identified infection/condition  Outcome: Progressing     Problem: DISCHARGE PLANNING  Goal: Discharge to home or other facility with appropriate resources  Description: INTERVENTIONS:  - Identify barriers to discharge w/patient and caregiver  - Arrange for needed discharge resources and transportation as appropriate  - Identify discharge learning needs (meds, wound care, etc.)  - Arrange for interpretive services to assist at discharge as needed  - Refer to Case Management Department for coordinating discharge planning if the patient needs post-hospital services based on physician/advanced practitioner order or complex needs related to functional status, cognitive ability, or social support system  Outcome: Progressing Problem: Knowledge Deficit  Goal: Patient/family/caregiver demonstrates understanding of disease process, treatment plan, medications, and discharge instructions  Description: Complete learning assessment and assess knowledge base. Interventions:  - Provide teaching at level of understanding  - Provide teaching via preferred learning methods  Outcome: Progressing     Problem: Nutrition/Hydration-ADULT  Goal: Nutrient/Hydration intake appropriate for improving, restoring or maintaining nutritional needs  Description: Monitor and assess patient's nutrition/hydration status for malnutrition. Collaborate with interdisciplinary team and initiate plan and interventions as ordered. Monitor patient's weight and dietary intake as ordered or per policy. Utilize nutrition screening tool and intervene as necessary. Determine patient's food preferences and provide high-protein, high-caloric foods as appropriate.      INTERVENTIONS:  - Monitor oral intake, urinary output, labs, and treatment plans  - Assess nutrition and hydration status and recommend course of action  - Evaluate amount of meals eaten  - Assist patient with eating if necessary   - Allow adequate time for meals  - Recommend/ encourage appropriate diets, oral nutritional supplements, and vitamin/mineral supplements  - Order, calculate, and assess calorie counts as needed  - Recommend, monitor, and adjust tube feedings and TPN/PPN based on assessed needs  - Assess need for intravenous fluids  - Provide specific nutrition/hydration education as appropriate  - Include patient/family/caregiver in decisions related to nutrition  Outcome: Progressing

## 2023-08-11 NOTE — ASSESSMENT & PLAN NOTE
- Last Echo on 6/14/21: EF 65 % Systolic function is normal. Wall motion is normal. Diastolic function is normal  - SOB with exertion, orthopnea, and BLE edema improved  - Weight today 242 lbs.  Recently discharged on 7/25 with weight 235 pounds.     Plan:  - Continue following cardiology recommendations:  - Continue IV Bumex 2 mg TID  - s/p Metolazone 2.5 mg PO x 2  - Hold amlodipine for adequate diuresis, continue Toprol and Xarelto   - Continue PO potassium supplementation 40 mEq qd   - Cardiac diet with sodium and fluid restrictions

## 2023-08-12 VITALS
TEMPERATURE: 97.6 F | BODY MASS INDEX: 37.54 KG/M2 | SYSTOLIC BLOOD PRESSURE: 114 MMHG | OXYGEN SATURATION: 94 % | HEART RATE: 70 BPM | RESPIRATION RATE: 16 BRPM | DIASTOLIC BLOOD PRESSURE: 82 MMHG | WEIGHT: 239.2 LBS | HEIGHT: 67 IN

## 2023-08-12 DIAGNOSIS — I50.9 ACUTE EXACERBATION OF CHF (CONGESTIVE HEART FAILURE) (HCC): ICD-10-CM

## 2023-08-12 LAB
ANION GAP SERPL CALCULATED.3IONS-SCNC: 7 MMOL/L
BUN SERPL-MCNC: 30 MG/DL (ref 5–25)
CALCIUM SERPL-MCNC: 9.4 MG/DL (ref 8.4–10.2)
CHLORIDE SERPL-SCNC: 93 MMOL/L (ref 96–108)
CO2 SERPL-SCNC: 34 MMOL/L (ref 21–32)
CREAT SERPL-MCNC: 2.37 MG/DL (ref 0.6–1.3)
ERYTHROCYTE [DISTWIDTH] IN BLOOD BY AUTOMATED COUNT: 14.7 % (ref 11.6–15.1)
GFR SERPL CREATININE-BSD FRML MDRD: 21 ML/MIN/1.73SQ M
GLUCOSE SERPL-MCNC: 98 MG/DL (ref 65–140)
HCT VFR BLD AUTO: 37.4 % (ref 34.8–46.1)
HGB BLD-MCNC: 11.7 G/DL (ref 11.5–15.4)
MAGNESIUM SERPL-MCNC: 2.1 MG/DL (ref 1.9–2.7)
MCH RBC QN AUTO: 25.5 PG (ref 26.8–34.3)
MCHC RBC AUTO-ENTMCNC: 31.3 G/DL (ref 31.4–37.4)
MCV RBC AUTO: 82 FL (ref 82–98)
PLATELET # BLD AUTO: 156 THOUSANDS/UL (ref 149–390)
PMV BLD AUTO: 11.4 FL (ref 8.9–12.7)
POTASSIUM SERPL-SCNC: 4 MMOL/L (ref 3.5–5.3)
RBC # BLD AUTO: 4.58 MILLION/UL (ref 3.81–5.12)
SODIUM SERPL-SCNC: 134 MMOL/L (ref 135–147)
WBC # BLD AUTO: 3.58 THOUSAND/UL (ref 4.31–10.16)

## 2023-08-12 PROCEDURE — 85027 COMPLETE CBC AUTOMATED: CPT

## 2023-08-12 PROCEDURE — 99232 SBSQ HOSP IP/OBS MODERATE 35: CPT | Performed by: INTERNAL MEDICINE

## 2023-08-12 PROCEDURE — 83735 ASSAY OF MAGNESIUM: CPT

## 2023-08-12 PROCEDURE — 80048 BASIC METABOLIC PNL TOTAL CA: CPT

## 2023-08-12 RX ORDER — QUINIDINE GLUCONATE 324 MG
120 TABLET, EXTENDED RELEASE ORAL 3 TIMES WEEKLY
Qty: 6 TABLET | Refills: 0 | Status: SHIPPED | OUTPATIENT
Start: 2023-08-14 | End: 2023-09-13

## 2023-08-12 RX ORDER — TORSEMIDE 20 MG/1
40 TABLET ORAL 2 TIMES DAILY
Status: DISCONTINUED | OUTPATIENT
Start: 2023-08-12 | End: 2023-08-12 | Stop reason: HOSPADM

## 2023-08-12 RX ORDER — METOLAZONE 2.5 MG/1
2.5 TABLET ORAL WEEKLY
Qty: 4 TABLET | Refills: 0 | Status: SHIPPED | OUTPATIENT
Start: 2023-08-12 | End: 2023-08-19

## 2023-08-12 RX ADMIN — METOPROLOL SUCCINATE 50 MG: 50 TABLET, EXTENDED RELEASE ORAL at 08:50

## 2023-08-12 RX ADMIN — PANTOPRAZOLE SODIUM 40 MG: 40 TABLET, DELAYED RELEASE ORAL at 08:50

## 2023-08-12 RX ADMIN — NICOTINE 1 PATCH: 14 PATCH, EXTENDED RELEASE TRANSDERMAL at 08:50

## 2023-08-12 RX ADMIN — POTASSIUM CHLORIDE 40 MEQ: 1500 TABLET, EXTENDED RELEASE ORAL at 08:50

## 2023-08-12 RX ADMIN — TORSEMIDE 40 MG: 20 TABLET ORAL at 12:54

## 2023-08-12 RX ADMIN — IRON SUCROSE 200 MG: 20 INJECTION, SOLUTION INTRAVENOUS at 08:50

## 2023-08-12 RX ADMIN — Medication 2.5 MG: at 03:41

## 2023-08-12 RX ADMIN — DICLOFENAC SODIUM 2 G: 10 GEL TOPICAL at 08:50

## 2023-08-12 RX ADMIN — ACETAMINOPHEN 975 MG: 325 TABLET, FILM COATED ORAL at 04:50

## 2023-08-12 RX ADMIN — BUMETANIDE 2 MG: 0.25 INJECTION INTRAMUSCULAR; INTRAVENOUS at 04:50

## 2023-08-12 RX ADMIN — DICLOFENAC SODIUM 2 G: 10 GEL TOPICAL at 11:27

## 2023-08-12 RX ADMIN — BUMETANIDE 2 MG: 0.25 INJECTION INTRAMUSCULAR; INTRAVENOUS at 11:27

## 2023-08-12 NOTE — DISCHARGE INSTR - AVS FIRST PAGE
Dear Roslyn Merrill,     It was our pleasure to care for you here at [facility]. It is our hope that we were always able to exceed the expected standards for your care during your stay. You were hospitalized due to shortness of breath. You were cared for on the 3rd floor by Ysabel Decker MD under the service of Anthony Santoyo, * with the [facility] who covers for your primary care physician (PCP), Hermila Vaz MD, while you were hospitalized. If you have any questions or concerns related to this hospitalization, you may contact us at [phone]. For follow up as well as any medication refills, we recommend that you follow up with your primary care physician. A registered nurse will reach out to you by phone within a few days after your discharge to answer any additional questions that you may have after going home.   However, at this time we provide for you here, the most important instructions / recommendations at discharge:     Notable Medication Adjustments -   Please stop taking amlodipine 5 daily  Please start taking torsemide twice a day  Please start taking Metolazone once a week  Please continue to take iron supplements three times a week with orange juice   Testing Required after Discharge -   Please follow-up with your primary care physician to complete blood work including a BMP to monitor your electrolytes and kidney function, as well as a CBC to monitor your iron deficiency anemia as an outpatient   Important follow up information -   Please follow-up with your PCP in 1 week  Please follow-up with your cardiologist  Other Instructions -   If you are experiencing any weight gain, chest pain, shortness of breath, palpitations, abdominal bloating, leg swelling, or any other symptoms please return to the ED  Please review this entire after visit summary as additional general instructions including medication list, appointments, activity, diet, any pertinent wound care, and other additional recommendations from your care team that may be provided for you.       Sincerely,     Hola Morrell MD

## 2023-08-12 NOTE — DISCHARGE SUMMARY
[address]  Discharge- Jacinda Wallis 1965, 62 y.o. female MRN: [id number]  Unit/Bed#: S -01 Encounter: [id number]  Primary Care Provider: Ilana Marlow MD   Date and time admitted to hospital: 8/8/2023 11:00 PM    * Acute on chronic diastolic CHF (congestive heart failure) ([address])  Assessment & Plan  - SOB with exertion, orthopnea, and BLE edema improved  - Echo on 5/12/23 - EF 65%. Systolic function is normal. Wall motion is normal. Diastolic function is normal  - Echo on 8/9/23 - EF 70%. Normal systolic function. Moderate asymmetric hypertrophy of apical and septal wall of the LV. Wall thickness is moderately increased. - Weight today 239 lbs. Recently discharged on 7/25/23 with weight 235 pounds.     Plan:  - Received IV Bumex during her stay for diuresis  - Transitioned to p.o. torsemide 40 mg daily, continue upon discharge  - Continue Metolazone 2.5 mg a week  - Continue metoprolol 50 mg qd  - Follow-up outpatient with cardiology    Acute kidney injury superimposed on CKD [facility])  Assessment & Plan  - Likely in setting of CHF exacerbation  - Cr 2.37  - Creatinine baseline 1.6-2.2. Plan  - Follow-up outpatient for repeat BMP in 1 week to evaluate kidney function. Hypokalemia  Assessment & Plan  - K 4.0     Plan  - Continue PO potassium 40 mEq daily with diuresis    Essential hypertension  Assessment & Plan  114/82    Plan:  • Continue Metoprolol 50 mg qd  • Follow-up with cardiology outpatient    Paroxysmal A-fib [facility])  Assessment & Plan  • S/p AVJ ablation with ventricle pacemaker.  Paced rhythm @70. • Xarelto 15 mg daily. Anemia  Assessment & Plan  5/29/23 - iron 22, ferritin 64, iron saturation 5, TIBC 420, folate 15.9    Plan:  - Rx for ferrous sulfate tablets given    Hepatitis C  Assessment & Plan  Follow-up as outpatient    Tobacco abuse  Assessment & Plan  Assessment:  • Smokes currently 1/2 ppd.  She has been a smoker for 43 years      Plan:  • Smoking cessation was recommended  • Nicotine replacement was provided    Medical Problems     Resolved Problems  Date Reviewed: 8/12/2023   None       Discharging Resident: Franko White DO  Discharging Attending: No att. providers found  PCP: Armin aJra MD  Admission Date:   Admission Orders (From admission, onward)     Ordered        08/09/23 0435  INPATIENT ADMISSION  Once                      Discharge Date: 08/12/23    Consultations During Hospital Stay:  · Cardiology    Procedures Performed:   · None    Significant Findings / Test Results:   CT abdomen pelvis wo contrast   Final Result by Yolanda Vides MD (08/09 8791)      No acute findings identified in the abdomen or pelvis. Unchanged punctate right upper pole intrarenal calculus. Workstation performed: [id number]         XR chest 2 views   ED Interpretation by Dmeond Goff (09/06 5211)   Mild pulmonary vascular congestion. No other acute cardiopulmonary disease identified by me. Final Result by Anthony Odom MD (39/77 2712)      Mild cardiomegaly      Mild vascular congestion                  Workstation performed: [id number]           ·     Incidental Findings:   · None    Test Results Pending at Discharge (will require follow up): · None     Outpatient Tests Requested:  · CBC and BMP    Complications: None    Reason for Admission: Acute on chronic diastolic congestive heart failure    Hospital Course:   Rosemary Primrose is a 62 y.o. female patient who originally presented to the hospital on 8/8/2023 due to due to leg swelling, chest discomfort and dyspnea on exertion that was worse with exertion and relieved on rest. She had a recent 15 pound weight gain since her last discharge from the hospital back in July 2023. In the emergency room upon presentation, she was hypertensive at 170/95. Blood work was significant for chronic anemia and thrombocytopenia.   Potassium level was low at 3.0, which was repleted throughout her stay in the hospital.  She came in with an acute kidney injury with an elevated creatinine of 2.61. Cardiac work-up was significant for an elevated BNP at 539. Chest x-ray showed mild pulmonary vascular congestion. EKG showed paced rhythm at a rate of 70 bpm with widened QRS with nonspecific intraventricular conduction delay with no acute ischemic changes. She also complained of left lower quadrant abdominal pain with nausea/vomiting/diarrhea for 1 day without fever but loose stools. CT abdomen/pelvis showed no acute findings to suggest diverticulitis or any other acute pathology. Cardiology was consulted for decompensated congestive heart failure. She had a bedside echocardiogram done on 8/9/23 that showed a left ventricular ejection fraction of 66%, normal systolic function, moderate asymmetric hypertrophy of apical and septal wall of left ventricle, wall thickness is moderate increased. Cardiology, she was started on IV Bumex and diuresed further with oral Metolazone. After adequate diuresis, she was transitioned to p.o. torsemide 40 mg daily, which she will continue upon discharge. She was also instructed to continue metaxalone 2.5 mg once per week. During her stay, she was also given iron supplementation for iron deficiency anemia. She was given a prescription for her sulfate tablets to take upon discharge. Plan for outpatient follow-up with primary care physician. Plan for follow-up with outpatient cardiologist.  Stable for discharge. Please see above list of diagnoses and related plan for additional information. Condition at Discharge: good    Discharge Day Visit / Exam:   Subjective:  Patient seen and examined at bedside. She was eating breakfast just prior to examination. She complained of mild left lower quadrant abdominal pain, greatly improved compared to yesterday. She states that her pain is typically alleviated with having a bowel movement.   She states that she has not had one earlier this morning, but typically has one after eating breakfast. She has no chest pain, palpitations, difficulty breathing. Vitals: Blood Pressure: 114/82 (08/12/23 0704)  Pulse: 70 (08/12/23 0704)  Temperature: 97.6 °F (36.4 °C) (08/12/23 0704)  Temp Source: Oral (08/12/23 0704)  Respirations: 16 (08/11/23 2022)  Height: 5' 7" (170.2 cm) (08/09/23 1457)  Weight - Scale: 109 kg (239 lb 3.2 oz) (08/12/23 0342)  SpO2: 94 % (08/12/23 0704)  Exam:   Physical Exam  Vitals and nursing note reviewed. Constitutional:       General: She is not in acute distress. Appearance: She is well-developed. HENT:      Head: Normocephalic and atraumatic. Right Ear: External ear normal.      Left Ear: External ear normal.      Nose: Nose normal.   Eyes:      Extraocular Movements: Extraocular movements intact. Conjunctiva/sclera: Conjunctivae normal.      Pupils: Pupils are equal, round, and reactive to light. Visual Fields: Right eye visual fields normal and left eye visual fields normal.   Cardiovascular:      Rate and Rhythm: Normal rate and regular rhythm. Heart sounds: No murmur heard. Pulmonary:      Effort: Pulmonary effort is normal. No respiratory distress. Breath sounds: Normal breath sounds. No wheezing, rhonchi or rales. Abdominal:      General: Bowel sounds are normal.      Palpations: Abdomen is soft. Tenderness: There is no abdominal tenderness. There is no guarding or rebound. Musculoskeletal:         General: No swelling. Cervical back: Neck supple. Right lower leg: No edema. Left lower leg: No edema. Skin:     General: Skin is warm and dry. Neurological:      Mental Status: She is alert. Cranial Nerves: Cranial nerves 2-12 are intact. Sensory: Sensation is intact. Motor: Motor function is intact. Psychiatric:         Mood and Affect: Mood normal.         Speech: Speech normal.         Behavior: Behavior normal. Behavior is cooperative. Discussion with Family: Patient to update . Discharge instructions/Information to patient and family:   See after visit summary for information provided to patient and family. Provisions for Follow-Up Care:  See after visit summary for information related to follow-up care and any pertinent home health orders. Disposition:   Home    Planned Readmission: No    Discharge Medications:  See after visit summary for reconciled discharge medications provided to patient and/or family.       **Please Note: This note may have been constructed using a voice recognition system**

## 2023-08-12 NOTE — PLAN OF CARE
Problem: PAIN - ADULT  Goal: Verbalizes/displays adequate comfort level or baseline comfort level  Description: Interventions:  - Encourage patient to monitor pain and request assistance  - Assess pain using appropriate pain scale  - Administer analgesics based on type and severity of pain and evaluate response  - Implement non-pharmacological measures as appropriate and evaluate response  - Consider cultural and social influences on pain and pain management  - Notify physician/advanced practitioner if interventions unsuccessful or patient reports new pain  Outcome: Progressing     Problem: INFECTION - ADULT  Goal: Absence or prevention of progression during hospitalization  Description: INTERVENTIONS:  - Assess and monitor for signs and symptoms of infection  - Monitor lab/diagnostic results  - Monitor all insertion sites, i.e. indwelling lines, tubes, and drains  - Monitor endotracheal if appropriate and nasal secretions for changes in amount and color  - [place] appropriate cooling/warming therapies per order  - Administer medications as ordered  - Instruct and encourage patient and family to use good hand hygiene technique  - Identify and instruct in appropriate isolation precautions for identified infection/condition  Outcome: Progressing     Problem: DISCHARGE PLANNING  Goal: Discharge to home or other facility with appropriate resources  Description: INTERVENTIONS:  - Identify barriers to discharge w/patient and caregiver  - Arrange for needed discharge resources and transportation as appropriate  - Identify discharge learning needs (meds, wound care, etc.)  - Arrange for interpretive services to assist at discharge as needed  - Refer to Case Management Department for coordinating discharge planning if the patient needs post-hospital services based on physician/advanced practitioner order or complex needs related to functional status, cognitive ability, or social support system  Outcome: Progressing Problem: Knowledge Deficit  Goal: Patient/family/caregiver demonstrates understanding of disease process, treatment plan, medications, and discharge instructions  Description: Complete learning assessment and assess knowledge base. Interventions:  - Provide teaching at level of understanding  - Provide teaching via preferred learning methods  Outcome: Progressing     Problem: Nutrition/Hydration-ADULT  Goal: Nutrient/Hydration intake appropriate for improving, restoring or maintaining nutritional needs  Description: Monitor and assess patient's nutrition/hydration status for malnutrition. Collaborate with interdisciplinary team and initiate plan and interventions as ordered. Monitor patient's weight and dietary intake as ordered or per policy. Utilize nutrition screening tool and intervene as necessary. Determine patient's food preferences and provide high-protein, high-caloric foods as appropriate.      INTERVENTIONS:  - Monitor oral intake, urinary output, labs, and treatment plans  - Assess nutrition and hydration status and recommend course of action  - Evaluate amount of meals eaten  - Assist patient with eating if necessary   - Allow adequate time for meals  - Recommend/ encourage appropriate diets, oral nutritional supplements, and vitamin/mineral supplements  - Order, calculate, and assess calorie counts as needed  - Recommend, monitor, and adjust tube feedings and TPN/PPN based on assessed needs  - Assess need for intravenous fluids  - Provide specific nutrition/hydration education as appropriate  - Include patient/family/caregiver in decisions related to nutrition  Outcome: Progressing

## 2023-08-12 NOTE — PLAN OF CARE
Problem: PAIN - ADULT  Goal: Verbalizes/displays adequate comfort level or baseline comfort level  Description: Interventions:  - Encourage patient to monitor pain and request assistance  - Assess pain using appropriate pain scale  - Administer analgesics based on type and severity of pain and evaluate response  - Implement non-pharmacological measures as appropriate and evaluate response  - Consider cultural and social influences on pain and pain management  - Notify physician/advanced practitioner if interventions unsuccessful or patient reports new pain  8/12/2023 1348 by Daphne Rice RN  Outcome: Completed  8/12/2023 0928 by Daphne Rice RN  Outcome: Progressing     Problem: INFECTION - ADULT  Goal: Absence or prevention of progression during hospitalization  Description: INTERVENTIONS:  - Assess and monitor for signs and symptoms of infection  - Monitor lab/diagnostic results  - Monitor all insertion sites, i.e. indwelling lines, tubes, and drains  - Monitor endotracheal if appropriate and nasal secretions for changes in amount and color  - [place] appropriate cooling/warming therapies per order  - Administer medications as ordered  - Instruct and encourage patient and family to use good hand hygiene technique  - Identify and instruct in appropriate isolation precautions for identified infection/condition  8/12/2023 1348 by Daphne Rice RN  Outcome: Completed  8/12/2023 459 [address] by Daphne Rice RN  Outcome: Progressing     Problem: DISCHARGE PLANNING  Goal: Discharge to home or other facility with appropriate resources  Description: INTERVENTIONS:  - Identify barriers to discharge w/patient and caregiver  - Arrange for needed discharge resources and transportation as appropriate  - Identify discharge learning needs (meds, wound care, etc.)  - Arrange for interpretive services to assist at discharge as needed  - Refer to Case Management Department for coordinating discharge planning if the patient needs post-hospital services based on physician/advanced practitioner order or complex needs related to functional status, cognitive ability, or social support system  8/12/2023 1348 by Richard Lopez RN  Outcome: Completed  8/12/2023 0928 by Richard Lopez RN  Outcome: Progressing     Problem: Knowledge Deficit  Goal: Patient/family/caregiver demonstrates understanding of disease process, treatment plan, medications, and discharge instructions  Description: Complete learning assessment and assess knowledge base. Interventions:  - Provide teaching at level of understanding  - Provide teaching via preferred learning methods  8/12/2023 1348 by Richard Lopez RN  Outcome: Completed  8/12/2023 0928 by Richard Lopez RN  Outcome: Progressing     Problem: Nutrition/Hydration-ADULT  Goal: Nutrient/Hydration intake appropriate for improving, restoring or maintaining nutritional needs  Description: Monitor and assess patient's nutrition/hydration status for malnutrition. Collaborate with interdisciplinary team and initiate plan and interventions as ordered. Monitor patient's weight and dietary intake as ordered or per policy. Utilize nutrition screening tool and intervene as necessary. Determine patient's food preferences and provide high-protein, high-caloric foods as appropriate.      INTERVENTIONS:  - Monitor oral intake, urinary output, labs, and treatment plans  - Assess nutrition and hydration status and recommend course of action  - Evaluate amount of meals eaten  - Assist patient with eating if necessary   - Allow adequate time for meals  - Recommend/ encourage appropriate diets, oral nutritional supplements, and vitamin/mineral supplements  - Order, calculate, and assess calorie counts as needed  - Recommend, monitor, and adjust tube feedings and TPN/PPN based on assessed needs  - Assess need for intravenous fluids  - Provide specific nutrition/hydration education as appropriate  - Include patient/family/caregiver in decisions related to nutrition  8/12/2023 1348 by Saeed Coe RN  Outcome: Completed  8/12/2023 0928 by Saeed Coe RN  Outcome: Progressing

## 2023-08-12 NOTE — QUICK NOTE
I have seen and examined the patient. I agree with discharge summary by internal medicine resident Dr. Lennard Cabot, DO    Date of service 8/12/2023    Patient comfortably sitting up in chair. Reports feeling better. Reports improved dyspnea. Agreeable to discharge plan    Vitals:    08/11/23 1613 08/11/23 2022 08/12/23 0342 08/12/23 0704   BP: 129/68 137/79  114/82   BP Location: Left arm Left arm  Left arm   Pulse: 71 70  70   Resp: 16 16     Temp: 98 °F (36.7 °C) 98.4 °F (36.9 °C)  97.6 °F (36.4 °C)   TempSrc: Oral Oral  Oral   SpO2: 97% 96%  94%   Weight:   109 kg (239 lb 3.2 oz)    Height:         Comfortably sitting up in chair, obese, Short thick neck, lungs diminished breath sounds bilaterally, heart sounds S1-S2 noted, abdomen soft, awake, obeys verbal commands, no edema, no rash      Assessment and plan:    • Acute on chronic diastolic ingestive heart failure received IV Bumex, transitioned to p.o. torsemide 40 mg twice daily and metolazone 2.5 mg once weekly per cardiology. Continue metoprolol. Outpatient cardiology follow-up  • Acute on chronic kidney disease stage III monitor kidney function, outpatient follow-up  • Hypertension monitor blood pressures, avoid hypotension  • Paroxysmal atrial fibrillation continue Toprol, Xarelto for anticoagulation  • Tobacco abuse tobacco cessation strongly encouraged  • Chronic anemia, mild thrombocytopenia mild leukopenia monitor counts outpatient follow-up  • Hepatitis C positive noted, outpatient follow-up  • Disposition planning discharge with outpatient cardiology follow-up.     Discussed with the patient, family at bedside updated      Anthony Santoyo MD

## 2023-08-12 NOTE — PROGRESS NOTES
Progress Note - Cardiology   [facility] 62 y.o. female MRN: [id number]  Unit/Bed#: S -01 Encounter: [id number]  08/12/23  12:32 PM    Impression and Plan:     70-year-old with a history of nonischemic tachycardia mediated cardiomyopathy-May 2021 and June 2022-in the setting of rapid atrial flutter, subsequently recovered ejection fraction-last echo-May 2023-65%, monomorphic VT-history of [employer] dual-chamber ICD 2016, history of atrial fibrillation ablation-May 2020, and subsequently broke through despite amiodarone,-in the past amiodarone was felt to be proarrhythmic based on documentation, atypical left atrial flutter ablation, second arrhythmia-micro reentrant atrial tachycardia versus atypical flutter, at that time-June 2022, more recently me 2023-having undergone AV miguel ablation, hypertension, dyslipidemia, CKD 3 baseline creatinine up to 2.1, chronic and ongoing tobacco use, marijuana use, obstructive sleep apnea on CPAP, last admission for acute heart failure,-July 2023 and discharged on July 25, 2023 at 235 pounds on torsemide 60 mg daily, now being readmitted with acute on chronic heart failure despite compliance with diuretics.     Creatinine of 2.6 at admission, now 2.4, was at 2.0 at discharge recently, elevated blood pressures at presentation at 178/95 although seem to have improved, and chest x-ray with mild vascular congestion     With increased dose of Bumex total of 6 mg yesterday with metolazone-substantial diuresis with 4 and half liters of urine output.     Continues to diurese, mild acute kidney injury with a creatinine of 2.4 today, compared to baseline around 2.1     Plan:     Acute CHF: Can change to p.o.-torsemide 40 mg twice daily with metolazone 2.5 mg once weekly, she was on torsemide 60 mg daily at admission     Currently weight is at 239 pounds-for comparison was at 235 at recent discharge but euvolemic on exam  Has CKD with a baseline Cr upto 2.1, presented with cardiorenal syndrome/acute kidney injury but improving with diuresis.   Recheck BMP in 1 week       History of tachycardia mediated cardiomyopathy-ejection fraction recovered and was 60% in May 2023 soon after her AV junction ablation since then has been 100% ventricular paced, prior to that was barely  Echo this admission with preserved EF     Hypertension: Elevated at presentation but now better controlled, continue to watch during hospitalization     History of atrial arrhythmias-atrial fibrillation status post ablation and breakthrough despite amiodarone, also atrial flutter, status post ablation in 2022 and ultimately AV junction/miguel ablation in May 2023 - her palpitations did improve  At [facility] last outpatient visit in [address] [place] [address], VVI rate was decreased to 70 and plan was to decrease it further to 60 in 2 months     CKD with mild acute kidney injury: This is cardiorenal syndrome, suspect that creatinine will stabilize soon    Per her request, okay for discharge from a cardiac standpoint on torsemide 40 twice daily, metolazone 2.5 mg once weekly and a BMP in 1 week      ===================================================================    Chief Complaint:   Chief Complaint   Patient presents with   • Leg Swelling     Pt reports bilateral leg swelling xcouple days, painful, chest pain, SOB, diarrhea, abd pains, denies n/v         Subjective/Objective     Subjective: Denies any new complaints, feels great    Objective: No distress    Patient Active Problem List   Diagnosis   • Gastroesophageal reflux disease    • History of monomorphic ventricular tachycardia, s/p ICD in 2016   • Headache   • Tobacco abuse   • Acute on chronic diastolic CHF (congestive heart failure) (HCC)   • Elevated troponin   • Elevated lipase   • Paroxysmal A-fib (HCC)   • Cocaine abuse ([address])   • Acute right flank pain   • Hypertensive urgency   • Chronic hepatitis C without hepatic coma ([address])   • Chronic kidney disease stage 3    • Atypical atrial flutter (HCC)   • Essential hypertension   • Nephrolithiasis   • Leucopenia   • Diverticulitis of large intestine without perforation or abscess   • Renal cyst   • Chronic heart failure with preserved ejection fraction (HFpEF) (HCC)   • Left low back pain   • Acute kidney injury superimposed on CKD ([address])   • Morbid obesity   • Anemia   • Abnormal finding on CT scan   • History of tachycardia induced cardiomyopathy   • Blood in stool   • Bilateral lower extremity edema   • Thrombocytopenia (HCC)   • Hypokalemia       Vitals: /82 (BP Location: Left arm) Comment (BP Location): on forearm  Pulse 70   Temp 97.6 °F (36.4 °C) (Oral)   Resp 16   Ht 5' 7" (1.702 m)   Wt 109 kg (239 lb 3.2 oz)   SpO2 94%   BMI 37.46 kg/m²     I/O this shift:  In: 200 [P.O.:200]  Out: 410 [Urine:410]  Wt Readings from Last 3 Encounters:   08/12/23 109 kg (239 lb 3.2 oz)   07/25/23 107 kg (235 lb 9.6 oz)   07/21/23 110 kg (243 lb)       Intake/Output Summary (Last 24 hours) at 8/12/2023 1232  Last data filed at 8/12/2023 0900  Gross per 24 hour   Intake 440 ml   Output 960 ml   Net -520 ml     I/O last 3 completed shifts:   In: 26 [P.O.:460]  Out: 3610 [Urine:3610]    Invasive Devices     Peripheral Intravenous Line  Duration           Peripheral IV 08/09/23 Right Antecubital 3 days                  Physical Exam:  GEN: Andre Sarmiento appears well, alert and oriented x 3, pleasant and cooperative   HEENT: pupils equal, round, and reactive to light; extraocular muscles intact  NECK: supple, no carotid bruits or JVD  HEART: regular rhythm, normal S1 and S2, no murmur, no clicks, gallops or rubs   LUNGS: clear to auscultation bilaterally; no wheezes or rhonchi, no rales  ABDOMEN/GI: normal bowel sounds, soft, no tenderness, no distention  EXTREMITIES/Musculoskeltal: peripheral pulses normal; no clubbing, cyanosis, no edema  NEURO: no focal motor findings   SKIN: normal without suspicious lesions on exposed skin Lab Results:       Results from last 7 days   Lab Units 08/12/23  0443 08/11/23  0459 08/10/23  1052   WBC Thousand/uL 3.58* 3.16* 3.82*   HEMOGLOBIN g/dL 11.7 10.7* 10.4*   HEMATOCRIT % 37.4 34.9 34.3*   PLATELETS Thousands/uL 156 146* 141*         Results from last 7 days   Lab Units 08/12/23  0443 08/11/23  0459 08/10/23  1052 08/09/23  0826 08/09/23  0028   POTASSIUM mmol/L 4.0 4.0 3.9   < > 3.0*   CHLORIDE mmol/L 93* 98 102   < > 102   CO2 mmol/L 34* 32 30   < > 30   BUN mg/dL 30* 27* 24   < > 29*   CREATININE mg/dL 2.37* 2.14* 2.18*   < > 2.61*   CALCIUM mg/dL 9.4 8.9 8.7   < > 8.7   ALK PHOS U/L  --   --   --   --  74   ALT U/L  --   --   --   --  14   AST U/L  --   --   --   --  18    < > = values in this interval not displayed. Imaging: I have personally reviewed pertinent reports.     EKG/Telemtry: No events    Scheduled Meds:  Current Facility-Administered Medications   Medication Dose Route Frequency Provider Last Rate   • acetaminophen  975 mg Oral Q8H [address] Brody Morgan MD     • bumetanide  2 mg Intravenous TID (diuretic) CHARLOTTE Bowens     • cyclobenzaprine  5 mg Oral HS Nakia Gifford DO     • Diclofenac Sodium  2 g Topical 4x Daily Brody Morgan MD     • doxazosin  2 mg Oral HS Nakia Gifford DO     • iron sucrose  200 mg Intravenous Daily Brody Morgan MD     • metoprolol succinate  50 mg Oral Daily Nakia Gifford DO     • nicotine  1 patch Transdermal Daily Nakia Gifford DO     • oxyCODONE  2.5 mg Oral Q6H PRN Brody Morgan MD     • pantoprazole  40 mg Oral Daily Nakia Gifford DO     • potassium chloride  40 mEq Oral Once Nakia Gifford DO     • potassium chloride  40 mEq Oral Daily Nakia Gifford DO     • rivaroxaban  15 mg Oral QPM Isadore Slates, DO       Continuous Infusions:       VTE Pharmacologic Prophylaxis: Reason for no pharmacologic prophylaxis Xarelto  VTE Mechanical Prophylaxis: sequential compression device    This note was completed in part utilizing m-modal fluency direct voice recognition software. Grammatical errors, random word insertion, spelling mistakes, occasional wrong word or "sound-alike" substitutions and incomplete sentences may be an occasional consequence of the system secondary to software limitations, ambient noise and hardware issues. At the time of dictation, efforts were made to edit, clarify and /or correct errors. Please read the chart carefully and recognize, using context, where substitutions have occurred. If you have any questions or concerns about the context, text or information contained within the body of this dictation, please contact myself, the provider, for further clarification.

## 2023-08-14 RX ORDER — TORSEMIDE 40 MG/1
1 TABLET, FILM COATED ORAL 2 TIMES DAILY
Qty: 60 TABLET | Refills: 0 | OUTPATIENT
Start: 2023-08-14

## 2023-08-14 NOTE — UTILIZATION REVIEW
NOTIFICATION OF ADMISSION DISCHARGE   This is a Notification of Discharge from [address] [place] [address]. Please be advised that this patient has been discharge from our facility. Below you will find the admission and discharge date and time including the patient’s disposition. UTILIZATION REVIEW CONTACT:  Amanda Bunn MA  Utilization   Network Utilization Review Department  Phone: [phone] x carefully listen to the prompts. All voicemails are confidential.  Email: [email or web address]     ADMISSION INFORMATION  PRESENTATION DATE: 8/8/2023 11:00 PM  OBERVATION ADMISSION DATE:   INPATIENT ADMISSION DATE: 8/9/23  4:35 AM   DISCHARGE DATE: 8/12/2023  2:07 PM   DISPOSITION:Home/Self Care    IMPORTANT INFORMATION:  Send all requests for admission clinical reviews, approved or denied determinations and any other requests to dedicated fax number below belonging to the campus where the patient is receiving treatment.  List of dedicated fax numbers:  [place] DENIALS (Administrative/Medical Necessity) [phone] [address] [facility] (Maternity/NICU/Pediatrics) [phone]   [facility] [phone]   [facility] [phone] [facility] [phone]   [address] [place] [address] [phone]   [facility] [phone]   [address] [place] [address] 608 [facility] [phone]   [address] [place] [address] [phone]   [address] [phone]   [address] [facility] [address] [phone]

## 2023-08-15 NOTE — PROGRESS NOTES
Electrophysiology Hospital Follow Up  [address] [facility] Cardiology [facility]  Zechariah Alvarez, Dinorah WARREN    Name: Farida Mantilla  : 1965  MRN: [id number]    ASSESSMENT:  1. Paroxysmal A-fib ([address])        2. Atypical atrial flutter (HCC)        3. History of tachycardia induced cardiomyopathy        4. History of monomorphic ventricular tachycardia, s/p ICD in             PLAN:  1. Persistent atrial flutter with rapid ventricular response, now status post AV node ablation 5/10/2023              A.) known atrial fibrillation with prior cryoablation with pulmonary vein isolation 2020, no note of typical flutter line performed at that time              B.) amiodarone started 2021 when she presented with tachy-mediated cardiomyopathy and increased afib burden with breakthrough despite amiodarone antiarrhythmic therapy and cessation of this therapy stopped 2023 (felt to be proarrhythmic)              C.) on Xarelto anticoagulation              D.)  Underwent ablation of roof flutter line and Micra reentrant atrial tachycardia by Dr. Hennessy 2022              E.)  Prior inappropriate shock for atrial flutter 2023              F.)  Rate control medication to be cut down after AVJ - no diltiazem and metoprolol lowered (see #5)  2. Hypertrophic cardiomyopathy  3. Chronic HFpEF  4. Prior tachy-mediated cardiomyopathy with now recovered               A.) echo this year showing EF of 60%              B.) previously 30% per echo 2021 in the setting of increased burden of rapid afib              C.) no obstructive CAD per cardiac cath 2019  5. Prior ventricular tachycardia in 2016, status post secondary prevention Medtronic dual chamber ICD              A.)  Beta blocker therapy of metoprolol succinate  6. Hypertension              A.) recent admission for hypertensive urgency - now maintained on amlodipine, metoprolol succinate 50mg, and doxazosin  7. CKD 3  8. Tobacco abuse  9. Obesity with BMI 32      IMPRESSION:  1. Device -patient is status post AV node ablation. In review however EKG today and interrogation, she is at times in sinus. Was not thought about until after she left but she does have an atrial lead and therefore should be switched from VVI to DDD R. She was switched to 60 bpm as low rate during this office visit. Had been concerned that she may have a drop in ejection fraction given wide pacing complex but repeat echo that was done this last hospitalization showed EF of 70% still. 2. Diuretic - Ami Kuhn is complaining of lightheadedness and dizziness and does not appear volume overloaded today. Rather, I am concerned that she is over diuresed and therefore we will send her for BMP to check kidney function to see if diuretic needs to be changed. She does need follow-up with general cardiology and will request that she be seen in a month. Patient is to follow up in our EP office in 6 months. She is to call our office with any further questions or concerns in the meantime. ADDENDUM: BMP showed creatinine of 4.69 when baseline is closer to 2. Did discuss with attending and will advise patient to go to the ER for further management of DANIELA and concern for renal failure. If feasible, will see if [employer] can reprogram her device while she is there. If not, will arrange device clinic appointment for device reprogramming VVI to [id number].    HPI:   Interim history: Ana Malloy is a 62 y.o. female with paroxysmal atrial fibrillation anticoagulated with Xarelto, monomorphic ventricular tachycardia status post ICD, hypertrophic cardiomyopathy, essential hypertension, chronic combined systolic and diastolic congestive heart failure, SURINDER compliant with CPAP, and remote history of cocaine use. She presents today for hospital follow up/2 month follow up.     She follows with Dr. Petrona May as an outpatient and has carried the diagnosis of atrial fibrillation since at least 2014.  She did have a cardioversion and loop recorder implantation in March of 2016 by Dr. Terri Cotter as far back as 2016, she has had history of RVR for which she is highly symptomatic.  She reports having undergone an ablation in 2015.  In 2016 she had life-threatening VT seen on loop recorder with syncope.  It was noted that atrial fibrillation had led to her VT.  She presented to the hospital and underwent loop recorder removal and ICD implantation 7/13/2016. Nuris Paulson had been added as she did have bradycardia as well.     She had been stable until December 2018 at which point she presented back to the hospital in atrial fibrillation with RVR and elevated troponin.  She did have a prior nuclear stress test that was abnormal and no records from [facility]) of her prior cardiac catheterization, it was recommended she underwent repeat carry a catheterization in January 2019 that showed no obstructive CAD.  In September of that same year, she did have worsening palpitations for which when she was in the hospital she did have PVC seen on telemetry.  She also had paroxysms of atrial fibrillation around this time seen on her device.       Eventually, she was seen again by Dr. John Matthews virtually in March of 2020 to further discuss her atrial fibrillation.  It was felt that some of her episodes of RVR or possibly cocaine related with her U tox being positive.  Recommendation for atrial fibrillation ablation was given after 6 weeks to determine if she could be compliant with anticoagulation.  Unfortunately, she was back in the hospital less than a week later due to RVR and had been placed on IV amiodarone for this which had converted back to sinus.  Therefore her ablation was expedited and she underwent pulmonary vein isolation by Dr. John Matthews on 5/20/2020.  Amiodarone continue to be loaded in the outpatient setting afterwards.  Interestingly, in follow-up a couple months later after her ablation amiodarone had been discontinued.     She had been attempted to establish which with the hypertrophic cardiomyopathy Clinic but has not shown for her appointments.     At the end of CVI 3480, she had another hospitalization for atrial fibrillation with RVR at which point a repeat echo was obtained that showed an ejection fraction of 30% which was down from 60%.   As she did convert to sinus rhythm while in hospital, she was sent home and it was recommended that she have close follow-up with EP for which amiodarone was continued.  She did establish with heart failure and was placed on Entresto.     Did have another hospitalization in January 2022 for atrial flutter converted on IV amiodarone.  She then started again with atrial flutter in April she did not respond to up titration of rate control medications.  For she presented to the emergency room and subsequently underwent ablation of atypical left atrial flutter using the roof along with left atrial septum micro reentrant atrial tachycardia by Dr. Jamari Juarez on 6/22/2022.  She tolerated the procedure well.  Since then she has had more hospitalizations for atrial flutter, first in October 2022 and subsequently in December 2022.  When she followed up with our office in January she was still in flutter and sent to hospital for consideration of either third ablation or AV node.  At that time amiodarone was discontinued to see if this was exacerbating atrial flutter and sent home.  In follow-up in February she was found to be in sinus rhythm.  She had another episode in February of atrial flutter.     More recently she presented back to [facility] on 5/8 for atrial flutter with RVR and electrophysiology was reached out to about transfer.     Patient feels that she has had issues with this for couple months.  Interrogation of her device does show that she started with atrial flutter 5/1 and has been in it since then. Davi Jerez has not had further shocks from her device since April when she was shocked for inappropriately for atrial flutter.     She was seen by our team during hospitalization in May at which point AVJ was recommended and she underwent. Diltiazem was discontinued and metoprolol lowered. She did have pseudo study afterwards that ruled out pseudo, AV fistula and she reports that the pain has subsided from that. Since being seen in the office 2 months ago, patient has had multiple hospitalizations some due to heart failure exacerbation. She has been discharged 8/12 but reports that since this, she has been lightheaded and dizzy. She feels like kala, has been drinking 3 bottles of water a day and has been taking her water pill that was uptitrated to torsemide 60 mg twice daily as prescribed. It is difficult for her to tolerate potassium pills and therefore she has been trying to take the liquid. She says that her back near her kidneys are hurting. EKG: Ventricular paced at 70 beats per minute    ROS:   Review of Systems   Constitutional: Negative for chills, diaphoresis, fever and malaise/fatigue. Cardiovascular: Negative for chest pain, claudication, cyanosis, dyspnea on exertion, irregular heartbeat, leg swelling, near-syncope, orthopnea, palpitations, paroxysmal nocturnal dyspnea and syncope. Respiratory: Negative for shortness of breath and sleep disturbances due to breathing. Neurological: Positive for dizziness and light-headedness. All other systems reviewed and are negative. OBJECTIVE:   Vitals:   /76 (BP Location: Right arm, Patient Position: Sitting, Cuff Size: Large)   Pulse 70   Ht 5' 7" (1.702 m)   Wt 109 kg (240 lb)   BMI 37.59 kg/m²       Physical Exam:   Physical Exam  Vitals and nursing note reviewed. Constitutional:       General: She is not in acute distress. Appearance: She is well-developed. She is not diaphoretic. HENT:      Head: Normocephalic and atraumatic.    Eyes:      Pupils: Pupils are equal, round, and reactive to light. Neck:      Vascular: No JVD. Cardiovascular:      Rate and Rhythm: Normal rate and regular rhythm. Heart sounds: No murmur heard. No friction rub. No gallop. Pulmonary:      Effort: Pulmonary effort is normal. No respiratory distress. Breath sounds: Normal breath sounds. No wheezing. Abdominal:      Palpations: Abdomen is soft. Musculoskeletal:         General: Normal range of motion. Cervical back: Normal range of motion. Skin:     General: Skin is warm and dry. Comments: Some CVA tenderness   Neurological:      Mental Status: She is alert and oriented to person, place, and time. Rhythm History:  1. Sees Dr. Erasmo Ball - has afib since at least 2014   A. Reported albaiton in 2015  2. CV and loop implant 3/2016 (Catalina Escalante)   A. Has symptoms with RVR  3. Hemodynamically significant VT (degenerated from afib) seen on loop 2016   4. Underwent dual chamber ICD implantation 7/13/2016  5. RVR admission 12/2018   A. With troponin elevation, underwent repeat cath 1/2019 - no obstructive disease   B. Around that time palpitations with PVC's (seen on tele)  6. Established with SS 3/2020   A. found that RVR episodes correlated with possible cocaine (+u tox)   B. Plan at first for 6 weeks of compliance with AC then ablation  7. Was back in hospital with RVR - underwent IV amio load  8. Expedited ablation with PVI 5/20/2020   A. amio was stopped a couple months after ablation  9. Diagnosed with HCM - has not shown to appointments  10.  May 2021 -[facility]    Medications:      Current Outpatient Medications:   •  cyclobenzaprine (FLEXERIL) 5 mg tablet, , Disp: , Rfl:   •  Diclofenac Sodium (VOLTAREN) 1 %, Apply 2 g topically every 6 (six) hours as needed (pain), Disp: 100 g, Rfl: 0  •  doxazosin (CARDURA) 2 mg tablet, take 1 tablet by mouth daily at bedtime, Disp: 30 tablet, Rfl: 5  •  ferrous gluconate (FERGON) 240 (27 FE) MG tablet, Take 0.5 tablets (120 mg total) by mouth 3 (three) times a week, Disp: 6 tablet, Rfl: 0  •  metolazone (ZAROXOLYN) 2.5 mg tablet, Take 1 tablet (2.5 mg total) by mouth once a week, Disp: 4 tablet, Rfl: 0  •  metoprolol succinate (TOPROL-XL) 50 mg 24 hr tablet, Take 1 tablet (50 mg total) by mouth daily Do not start before May 13, 2023., Disp: 30 tablet, Rfl: 0  •  pantoprazole (PROTONIX) 40 mg tablet, take 1 tablet by mouth once daily, Disp: 30 tablet, Rfl: 2  •  potassium chloride 10% oral solution, Take 15 mL (20 mEq total) by mouth 2 (two) times a day (Patient taking differently: Take 20 mEq by mouth every morning), Disp: 473 mL, Rfl: 1  •  rivaroxaban (XARELTO) 15 mg tablet, Take 1 tablet (15 mg total) by mouth every evening, Disp: 30 tablet, Rfl: 11  •  torsemide 40 MG TABS, Take 40 mg by mouth 2 (two) times a day, Disp: 60 tablet, Rfl: 0     Family History   Problem Relation Age of Onset   • Arthritis Family    • Cancer Family    • Diabetes Family    • Hypertension Family    • Cancer Maternal Grandmother      Social History     Socioeconomic History   • Marital status: /Civil Union     Spouse name: Not on file   • Number of children: Not on file   • Years of education: 12   • Highest education level: Not on file   Occupational History   • Not on file   Tobacco Use   • Smoking status: Every Day     Packs/day: 0.50     Years: 35.00     Total pack years: 17.50     Types: Cigarettes   • Smokeless tobacco: Never   Vaping Use   • Vaping Use: Never used   Substance and Sexual Activity   • Alcohol use: Not Currently     Comment: occassionally   • Drug use: Yes     Types: Marijuana, Cocaine     Comment: Last used cocaine in 03/2023. Currently smoking "1 drag of a joint" at night to help her sleep (Updated 05/17/2023).    • Sexual activity: Yes     Partners: Male     Birth control/protection: None   Other Topics Concern   • Not on file   Social History Narrative    Disabled        · Do you currently or have you served in the [address] [place] [address] [place]: No      · Were you activated, into active duty, as a member of the [employer] or as a Reservist:   No      · Diet:   Regular      · General stress level:   High      · Has smoked since age:   12      · Caffeine intake: Moderate      · Guns present in home:   No      · Seat belts used routinely:   Yes      · Sunscreen used routinely:   No      · Advance directive:   No      · Live alone or with others:   with others      · International travel:   no      · Pets:   No      · Blind or serious difficulty seeing: Yes     · Difficulty concentrating, remembering or making decisions:   No      · Difficulty walking or climbing stairs: Yes      · Difficulty dressing or bathing:   No      · Difficulty doing errands alone:   No      · What is the highest grade or level of school you have completed or the highest degree you have received:   12th grade, no diploma      · How many days of moderate to strenuous exercise, like a brisk walk, did you do in the last 7 days:   0      · How hard is it for you to pay for the very basics like food, housing, medical care, and heating:   Somewhat hard      · Do you feel stress - tense, restless, nervous, or anxious, or unable to sleep at night because your mind is troubled all the time - these days: Only a little          Social Determinants of Health     Financial Resource Strain: Not on file   Food Insecurity: Food Insecurity Present (5/31/2023)    Hunger Vital Sign    • Worried About Running Out of Food in the Last Year: Sometimes true    • Ran Out of Food in the Last Year: Sometimes true   Transportation Needs: No Transportation Needs (5/31/2023)    PRAPARE - Transportation    • Lack of Transportation (Medical): No    • Lack of Transportation (Non-Medical):  No   Physical Activity: Not on file   Stress: Not on file   Social Connections: Not on file   Intimate Partner Violence: Not on file   Housing Stability: Low Risk  (5/31/2023)    Housing Stability Vital Sign    • Unable to Pay for Housing in the Last Year: No    • Number of Places Lived in the Last Year: 1    • Unstable Housing in the Last Year: No     Social History     Tobacco Use   Smoking Status Every Day   • Packs/day: 0.50   • Years: 35.00   • Total pack years: 17.50   • Types: Cigarettes   Smokeless Tobacco Never     Social History     Substance and Sexual Activity   Alcohol Use Not Currently    Comment: occassionally       Labs & Results:  Below is the patient's most recent value for Albumin, ALT, AST, BUN, Calcium, Chloride, Cholesterol, CO2, Creatinine, GFR, Glucose, HDL, Hematocrit, Hemoglobin, Hemoglobin A1C, LDL, Magnesium, Phosphorus, Platelets, Potassium, PSA, Sodium, Triglycerides, and WBC. Lab Results   Component Value Date    ALT 14 2023    AST 18 2023    BUN 30 (H) 2023    CALCIUM 9.4 2023    CL 93 (L) 2023    CO2 34 (H) 2023    CREATININE 2.37 (H) 2023    HDL 40 (L) 2023    HCT 37.4 2023    HGB 11.7 2023    HGBA1C 5.4 2023    MG 2.1 2023    PHOS 3.1 2023     2023    K 4.0 2023    TRIG 83 2023    WBC 3.58 (L) 2023     Note: for a comprehensive list of the patient's lab results, access the Results Review activity.     CARDIAC TESTING:   ECHO:   Results for orders placed during the hospital encounter of 21    Echo complete with contrast if indicated    Narrative  [facility] [address], [address] [place] [address]    Transthoracic Echocardiogram  2D, M-mode, Doppler, and Color Doppler    Study date:  25-May-2021    Patient: Monet Hureta  MR number: [id number]  Account number: [de-identified]  : 1965  Age: 64 years  Gender: Female  Status: Inpatient  Location: [facility]  Height: 67 in  Weight: 212 lb  BP: 118/ 62 mmHg    Indications: Afib    Diagnoses: I48.0 - Atrial fibrillation    Sonographer:  PRISCILLA Fontaine  Referring Physician:  Governor Drake, MD  Group: [facility]  Interpreting Physician:  Nury Potts MD    SUMMARY    LEFT VENTRICLE:  Systolic function was moderately to markedly reduced. Ejection fraction was estimated to be 30 %. There was moderate diffuse hypokinesis. There was severe concentric hypertrophy. There was significant hypertrophy of the LV apex. RIGHT VENTRICLE:  The size was normal.  Systolic function was reduced. LEFT ATRIUM:  The atrium was dilated. HISTORY: PRIOR HISTORY: Cocaine abuse, Smoker, CKD III, Congestive heart failure. Hypertrophic cardiomyopathy. Atrial fibrillation. Risk factors: hypercholesterolemia. PRIOR PROCEDURES: ICD implantation. Arrhythmia ablation. PROCEDURE: The study was performed in the [facility]. This was a routine study. The transthoracic approach was used. The study included complete 2D imaging, M-mode, complete spectral Doppler, and color Doppler. The heart rate was 138  bpm, at the start of the study. Images were obtained from the parasternal, apical, subcostal, and suprasternal notch acoustic windows. Intravenous contrast ( .6 mL Definity in NSS) was administered. Echocardiographic views were limited due  to poor acoustic window availability and lung interference. This was a technically difficult study. LEFT VENTRICLE: Size was normal. Systolic function was moderately to markedly reduced. Ejection fraction was estimated to be 30 %. There was moderate diffuse hypokinesis. Wall thickness was markedly increased. There was severe concentric  hypertrophy. There was significant hypertrophy of the LV apex. DOPPLER: Due to tachycardia, there was fusion of early and atrial contributions to ventricular filling. The study was not technically sufficient to allow evaluation of LV  diastolic function. RIGHT VENTRICLE: The size was normal. Systolic function was reduced. Wall thickness was normal. A pacing wire was present. LEFT ATRIUM: The atrium was dilated.     RIGHT ATRIUM: Size was normal. A pacing wire was present. MITRAL VALVE: Valve structure was normal. There was normal leaflet separation. DOPPLER: The transmitral velocity was within the normal range. There was no evidence for stenosis. There was trace regurgitation. AORTIC VALVE: The valve was trileaflet. Leaflets exhibited normal thickness and normal cuspal separation. DOPPLER: Transaortic velocity was within the normal range. There was no evidence for stenosis. There was no significant  regurgitation. TRICUSPID VALVE: The valve structure was normal. There was normal leaflet separation. DOPPLER: The transtricuspid velocity was within the normal range. There was no evidence for stenosis. There was trace regurgitation. Pulmonary artery  systolic pressure was within the normal range. Estimated peak PA pressure was 21 mmHg. PULMONIC VALVE: Leaflets exhibited normal thickness, no calcification, and normal cuspal separation. DOPPLER: The transpulmonic velocity was within the normal range. There was trace regurgitation. PERICARDIUM: There was no pericardial effusion. The pericardium was normal in appearance. AORTA: The root exhibited normal size. SYSTEMIC VEINS: IVC: The inferior vena cava was normal in size.  Respirophasic changes were normal.    SYSTEM MEASUREMENT TABLES    2D  %FS: 27.49 %  Ao Diam: 2.77 cm  EDV(Teich): 57.94 ml  EF(Teich): 54.26 %  ESV(Teich): 26.5 ml  IVSd: 2.46 cm  LA Area: 26.06 cm2  LA Diam: 4.27 cm  LVIDd: 3.7 cm  LVIDs: 2.68 cm  LVPWd: 1.79 cm  RA Area: 18.49 cm2  RVIDd: 3.01 cm  RWT: 0.97  SV(Teich): 31.44 ml    CW  TR Vmax: 2.14 m/s  TR maxP.28 mmHg    MM  TAPSE: 1.51 cm    [employer] Commission Accredited Echocardiography Laboratory    Prepared and electronically signed by    Chela Francois MD  Signed [id number] 14:44:53    Results for orders placed during the hospital encounter of 20    HUBER    Narrative  [address] [address] [address]  [address], [address] [facility]  [phone]    Transesophageal Echocardiogram  2D, Doppler, and Color Doppler    Study date:  20-May-2020    Patient: Jason Alvarez  MR number: [id number]  Account number: [de-identified]  : 1965  Age: 54 years  Gender: Female  Status: Outpatient  Location: Cath lab  Height: 67 in  Weight: 221 lb  BP:    Indications: AFIB    Diagnoses: I48.0 - Atrial fibrillation    Sonographer:  Annell Leyden, RCS  Interpreting Physician:  Dereje Marin MD  Primary Physician:  Ashley Rothman MD  Referring Physician:  Dereje Marin MD  Group:  [facility]    SUMMARY    LEFT VENTRICLE:  Systolic function was normal. Ejection fraction was estimated to be 60 %. Wall thickness was moderately increased. There was moderate concentric hypertrophy. LEFT ATRIUM:  The atrium was mildly dilated. LEFT ATRIAL APPENDAGE:  The size was normal.  The function was normal (normal emptying velocity). No thrombus was identified. ATRIAL SEPTUM:  No defect or patent foramen ovale was identified. HISTORY: PRIOR HISTORY: AFIB, PPM, MI, HOCM, HTN, HLD, CKD III, Smoker, Cocaine abuse    PROCEDURE: The procedure was performed in the catheterization laboratory. This was a routine study. The risks and alternatives of the procedure were explained to the patient and informed consent was obtained. The transesophageal approach  was used. The study included complete 2D imaging, complete spectral Doppler, and color Doppler. An adult omniplane probe was inserted by the attending cardiologist. Intubated with ease. One intubation attempt(s). There was no blood  detected on the probe. Image quality was adequate. MEDICATIONS: Anesthesia. LEFT VENTRICLE: Size was normal. Systolic function was normal. Ejection fraction was estimated to be 60 %. Wall thickness was moderately increased. There was moderate concentric hypertrophy.     RIGHT VENTRICLE: The size was normal. Systolic function was normal. Wall thickness was normal. A pacing wire was present. LEFT ATRIUM: The atrium was mildly dilated. No mass was present. There was no spontaneous echo contrast ("smoke"). APPENDAGE: The size was normal. No thrombus was identified. DOPPLER: The function was normal (normal emptying velocity). ATRIAL SEPTUM: No defect or patent foramen ovale was identified. RIGHT ATRIUM: Size was normal. No thrombus was identified. MITRAL VALVE: Valve structure was normal. There was normal leaflet separation. There was no echocardiographic evidence of vegetation. DOPPLER: There was no regurgitation. AORTIC VALVE: The valve was trileaflet. Leaflets exhibited normal thickness and normal cuspal separation. There was no echocardiographic evidence of vegetation. DOPPLER: There was no regurgitation. TRICUSPID VALVE: The valve structure was normal. There was normal leaflet separation. There was no echocardiographic evidence of vegetation. DOPPLER: There was no regurgitation. PERICARDIUM: There was no pericardial effusion. The pericardium was normal in appearance. [facility] Accredited Echocardiography Laboratory    Prepared and electronically signed by    Brando Ware MD  Signed [id number] 09:25:57      CATH:  No results found for this or any previous visit. STRESS TEST:  No results found for this or any previous visit.

## 2023-08-16 ENCOUNTER — OFFICE VISIT (OUTPATIENT)
Dept: CARDIOLOGY CLINIC | Facility: CLINIC | Age: 58
End: 2023-08-16
Payer: COMMERCIAL

## 2023-08-16 ENCOUNTER — APPOINTMENT (OUTPATIENT)
Dept: LAB | Facility: CLINIC | Age: 58
End: 2023-08-16
Payer: COMMERCIAL

## 2023-08-16 ENCOUNTER — HOSPITAL ENCOUNTER (INPATIENT)
Facility: HOSPITAL | Age: 58
LOS: 3 days | Discharge: HOME | End: 2023-08-19
Attending: STUDENT IN AN ORGANIZED HEALTH CARE EDUCATION/TRAINING PROGRAM | Admitting: INTERNAL MEDICINE
Payer: COMMERCIAL

## 2023-08-16 VITALS
BODY MASS INDEX: 37.67 KG/M2 | DIASTOLIC BLOOD PRESSURE: 76 MMHG | WEIGHT: 240 LBS | HEART RATE: 70 BPM | HEIGHT: 67 IN | SYSTOLIC BLOOD PRESSURE: 106 MMHG

## 2023-08-16 DIAGNOSIS — R00.0 TACHYCARDIA INDUCED CARDIOMYOPATHY (HCC): ICD-10-CM

## 2023-08-16 DIAGNOSIS — N17.9 ACUTE KIDNEY INJURY (HCC): Primary | ICD-10-CM

## 2023-08-16 DIAGNOSIS — N18.9 ACUTE KIDNEY INJURY SUPERIMPOSED ON CKD (HCC): ICD-10-CM

## 2023-08-16 DIAGNOSIS — E87.8 ELECTROLYTE ABNORMALITY: ICD-10-CM

## 2023-08-16 DIAGNOSIS — Z86.79 HISTORY OF VENTRICULAR TACHYCARDIA: ICD-10-CM

## 2023-08-16 DIAGNOSIS — N17.9 ACUTE KIDNEY INJURY SUPERIMPOSED ON CKD (HCC): ICD-10-CM

## 2023-08-16 DIAGNOSIS — I48.0 PAROXYSMAL A-FIB (HCC): Primary | ICD-10-CM

## 2023-08-16 DIAGNOSIS — I48.0 PAROXYSMAL A-FIB (HCC): ICD-10-CM

## 2023-08-16 DIAGNOSIS — I43 TACHYCARDIA INDUCED CARDIOMYOPATHY (HCC): ICD-10-CM

## 2023-08-16 DIAGNOSIS — Z72.0 TOBACCO ABUSE: ICD-10-CM

## 2023-08-16 DIAGNOSIS — T50.2X5A DIURETIC-INDUCED HYPOKALEMIA: ICD-10-CM

## 2023-08-16 DIAGNOSIS — I50.33 ACUTE ON CHRONIC DIASTOLIC CHF (CONGESTIVE HEART FAILURE) (HCC): Chronic | ICD-10-CM

## 2023-08-16 DIAGNOSIS — I48.4 ATYPICAL ATRIAL FLUTTER (HCC): ICD-10-CM

## 2023-08-16 DIAGNOSIS — I50.9 CHF EXACERBATION (HCC): ICD-10-CM

## 2023-08-16 DIAGNOSIS — E87.6 HYPOKALEMIA: ICD-10-CM

## 2023-08-16 DIAGNOSIS — E87.6 DIURETIC-INDUCED HYPOKALEMIA: ICD-10-CM

## 2023-08-16 DIAGNOSIS — N18.32 STAGE 3B CHRONIC KIDNEY DISEASE (HCC): ICD-10-CM

## 2023-08-16 LAB
ALBUMIN SERPL BCP-MCNC: 4.1 G/DL (ref 3.5–5)
ALP SERPL-CCNC: 72 U/L (ref 34–104)
ALT SERPL W P-5'-P-CCNC: 18 U/L (ref 7–52)
ANION GAP SERPL CALCULATED.3IONS-SCNC: 13 MMOL/L
ANION GAP SERPL CALCULATED.3IONS-SCNC: 6 MMOL/L
AST SERPL W P-5'-P-CCNC: 28 U/L (ref 13–39)
BACTERIA UR QL AUTO: ABNORMAL /HPF
BASOPHILS # BLD AUTO: 0.02 THOUSANDS/ÂΜL (ref 0–0.1)
BASOPHILS NFR BLD AUTO: 0 % (ref 0–1)
BILIRUB SERPL-MCNC: 0.44 MG/DL (ref 0.2–1)
BILIRUB UR QL STRIP: NEGATIVE
BUN SERPL-MCNC: 66 MG/DL (ref 5–25)
BUN SERPL-MCNC: 68 MG/DL (ref 5–25)
CALCIUM SERPL-MCNC: 8.9 MG/DL (ref 8.4–10.2)
CALCIUM SERPL-MCNC: 9.5 MG/DL (ref 8.3–10.1)
CHLORIDE SERPL-SCNC: 93 MMOL/L (ref 96–108)
CHLORIDE SERPL-SCNC: 96 MMOL/L (ref 96–108)
CLARITY UR: CLEAR
CO2 SERPL-SCNC: 32 MMOL/L (ref 21–32)
CO2 SERPL-SCNC: 33 MMOL/L (ref 21–32)
COLOR UR: ABNORMAL
CREAT SERPL-MCNC: 4.46 MG/DL (ref 0.6–1.3)
CREAT SERPL-MCNC: 4.69 MG/DL (ref 0.6–1.3)
EOSINOPHIL # BLD AUTO: 0.06 THOUSAND/ÂΜL (ref 0–0.61)
EOSINOPHIL NFR BLD AUTO: 1 % (ref 0–6)
ERYTHROCYTE [DISTWIDTH] IN BLOOD BY AUTOMATED COUNT: 15.6 % (ref 11.6–15.1)
GFR SERPL CREATININE-BSD FRML MDRD: 10 ML/MIN/1.73SQ M
GFR SERPL CREATININE-BSD FRML MDRD: 9 ML/MIN/1.73SQ M
GLUCOSE P FAST SERPL-MCNC: 115 MG/DL (ref 65–99)
GLUCOSE SERPL-MCNC: 131 MG/DL (ref 65–140)
GLUCOSE UR STRIP-MCNC: NEGATIVE MG/DL
HCT VFR BLD AUTO: 36.2 % (ref 34.8–46.1)
HGB BLD-MCNC: 11 G/DL (ref 11.5–15.4)
HGB UR QL STRIP.AUTO: NEGATIVE
HYALINE CASTS #/AREA URNS LPF: ABNORMAL /LPF
IMM GRANULOCYTES # BLD AUTO: 0.02 THOUSAND/UL (ref 0–0.2)
IMM GRANULOCYTES NFR BLD AUTO: 0 % (ref 0–2)
KETONES UR STRIP-MCNC: NEGATIVE MG/DL
LEUKOCYTE ESTERASE UR QL STRIP: ABNORMAL
LYMPHOCYTES # BLD AUTO: 1.07 THOUSANDS/ÂΜL (ref 0.6–4.47)
LYMPHOCYTES NFR BLD AUTO: 17 % (ref 14–44)
MCH RBC QN AUTO: 25.6 PG (ref 26.8–34.3)
MCHC RBC AUTO-ENTMCNC: 30.4 G/DL (ref 31.4–37.4)
MCV RBC AUTO: 84 FL (ref 82–98)
MONOCYTES # BLD AUTO: 0.42 THOUSAND/ÂΜL (ref 0.17–1.22)
MONOCYTES NFR BLD AUTO: 7 % (ref 4–12)
NEUTROPHILS # BLD AUTO: 4.61 THOUSANDS/ÂΜL (ref 1.85–7.62)
NEUTS SEG NFR BLD AUTO: 75 % (ref 43–75)
NITRITE UR QL STRIP: NEGATIVE
NON-SQ EPI CELLS URNS QL MICRO: ABNORMAL /HPF
NRBC BLD AUTO-RTO: 0 /100 WBCS
PH UR STRIP.AUTO: 5.5 [PH]
PLATELET # BLD AUTO: 138 THOUSANDS/UL (ref 149–390)
PMV BLD AUTO: 12.1 FL (ref 8.9–12.7)
POTASSIUM SERPL-SCNC: 3.2 MMOL/L (ref 3.5–5.3)
POTASSIUM SERPL-SCNC: 3.7 MMOL/L (ref 3.5–5.3)
PROT SERPL-MCNC: 7.7 G/DL (ref 6.4–8.4)
PROT UR STRIP-MCNC: NEGATIVE MG/DL
RBC # BLD AUTO: 4.29 MILLION/UL (ref 3.81–5.12)
RBC #/AREA URNS AUTO: ABNORMAL /HPF
SODIUM SERPL-SCNC: 135 MMOL/L (ref 135–147)
SODIUM SERPL-SCNC: 138 MMOL/L (ref 135–147)
SP GR UR STRIP.AUTO: 1.01 (ref 1–1.03)
UROBILINOGEN UR STRIP-ACNC: <2 MG/DL
WBC # BLD AUTO: 6.2 THOUSAND/UL (ref 4.31–10.16)
WBC #/AREA URNS AUTO: ABNORMAL /HPF

## 2023-08-16 PROCEDURE — 85025 COMPLETE CBC W/AUTO DIFF WBC: CPT

## 2023-08-16 PROCEDURE — 36415 COLL VENOUS BLD VENIPUNCTURE: CPT

## 2023-08-16 PROCEDURE — 99285 EMERGENCY DEPT VISIT HI MDM: CPT | Performed by: STUDENT IN AN ORGANIZED HEALTH CARE EDUCATION/TRAINING PROGRAM

## 2023-08-16 PROCEDURE — 93000 ELECTROCARDIOGRAM COMPLETE: CPT | Performed by: PHYSICIAN ASSISTANT

## 2023-08-16 PROCEDURE — 96360 HYDRATION IV INFUSION INIT: CPT

## 2023-08-16 PROCEDURE — 99285 EMERGENCY DEPT VISIT HI MDM: CPT

## 2023-08-16 PROCEDURE — 80053 COMPREHEN METABOLIC PANEL: CPT

## 2023-08-16 PROCEDURE — 81001 URINALYSIS AUTO W/SCOPE: CPT

## 2023-08-16 PROCEDURE — 80048 BASIC METABOLIC PNL TOTAL CA: CPT

## 2023-08-16 PROCEDURE — 99214 OFFICE O/P EST MOD 30 MIN: CPT | Performed by: INTERNAL MEDICINE

## 2023-08-16 RX ORDER — SODIUM CHLORIDE, SODIUM GLUCONATE, SODIUM ACETATE, POTASSIUM CHLORIDE, MAGNESIUM CHLORIDE, SODIUM PHOSPHATE, DIBASIC, AND POTASSIUM PHOSPHATE .53; .5; .37; .037; .03; .012; .00082 G/100ML; G/100ML; G/100ML; G/100ML; G/100ML; G/100ML; G/100ML
125 INJECTION, SOLUTION INTRAVENOUS CONTINUOUS
Status: DISPENSED | OUTPATIENT
Start: 2023-08-16 | End: 2023-08-17

## 2023-08-16 RX ORDER — POTASSIUM CHLORIDE 20MEQ/15ML
40 LIQUID (ML) ORAL ONCE
Status: COMPLETED | OUTPATIENT
Start: 2023-08-16 | End: 2023-08-16

## 2023-08-16 RX ORDER — DOXYCYCLINE HYCLATE 50 MG/1
324 CAPSULE, GELATIN COATED ORAL 3 TIMES WEEKLY
Status: DISCONTINUED | OUTPATIENT
Start: 2023-08-17 | End: 2023-08-19 | Stop reason: HOSPADM

## 2023-08-16 RX ORDER — METOPROLOL SUCCINATE 50 MG/1
50 TABLET, EXTENDED RELEASE ORAL DAILY
Status: DISCONTINUED | OUTPATIENT
Start: 2023-08-17 | End: 2023-08-19 | Stop reason: HOSPADM

## 2023-08-16 RX ORDER — ACETAMINOPHEN 325 MG/1
975 TABLET ORAL ONCE
Status: COMPLETED | OUTPATIENT
Start: 2023-08-16 | End: 2023-08-16

## 2023-08-16 RX ORDER — ACETAMINOPHEN 325 MG/1
975 TABLET ORAL EVERY 8 HOURS
Status: DISCONTINUED | OUTPATIENT
Start: 2023-08-16 | End: 2023-08-16

## 2023-08-16 RX ORDER — PANTOPRAZOLE SODIUM 40 MG/1
40 TABLET, DELAYED RELEASE ORAL
Status: DISCONTINUED | OUTPATIENT
Start: 2023-08-17 | End: 2023-08-19 | Stop reason: HOSPADM

## 2023-08-16 RX ORDER — ACETAMINOPHEN 325 MG/1
975 TABLET ORAL EVERY 8 HOURS
Status: DISCONTINUED | OUTPATIENT
Start: 2023-08-17 | End: 2023-08-19 | Stop reason: HOSPADM

## 2023-08-16 RX ORDER — POTASSIUM CHLORIDE 20 MEQ/1
20 TABLET, EXTENDED RELEASE ORAL DAILY
Qty: 90 TABLET | Refills: 3 | Status: SHIPPED | OUTPATIENT
Start: 2023-08-16

## 2023-08-16 RX ORDER — DOXAZOSIN MESYLATE 1 MG/1
2 TABLET ORAL
Status: DISCONTINUED | OUTPATIENT
Start: 2023-08-16 | End: 2023-08-17

## 2023-08-16 RX ADMIN — RIVAROXABAN 15 MG: 15 TABLET, FILM COATED ORAL at 22:37

## 2023-08-16 RX ADMIN — DOXAZOSIN 2 MG: 1 TABLET ORAL at 22:37

## 2023-08-16 RX ADMIN — SODIUM CHLORIDE, SODIUM GLUCONATE, SODIUM ACETATE, POTASSIUM CHLORIDE, MAGNESIUM CHLORIDE, SODIUM PHOSPHATE, DIBASIC, AND POTASSIUM PHOSPHATE 125 ML/HR: .53; .5; .37; .037; .03; .012; .00082 INJECTION, SOLUTION INTRAVENOUS at 22:38

## 2023-08-16 RX ADMIN — ACETAMINOPHEN 975 MG: 325 TABLET, FILM COATED ORAL at 18:05

## 2023-08-16 RX ADMIN — SODIUM CHLORIDE 500 ML: 0.9 INJECTION, SOLUTION INTRAVENOUS at 18:16

## 2023-08-16 RX ADMIN — POTASSIUM CHLORIDE 40 MEQ: 1.5 SOLUTION ORAL at 22:37
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
## 2023-08-16 NOTE — ED ATTENDING ATTESTATION
8/16/2023  Irving Centeno DO, saw and evaluated the patient. I have discussed the patient with the resident/non-physician practitioner and agree with the resident's/non-physician practitioner's findings, Plan of Care, and MDM as documented in the resident's/non-physician practitioner's note, except where noted. All available labs and Radiology studies were reviewed. I was present for key portions of any procedure(s) performed by the resident/non-physician practitioner and I was immediately available to provide assistance. At this point I agree with the current assessment done in the Emergency Department. I have conducted an independent evaluation of this patient a history and physical is as follows:    63-year-old female past medical history significant for CKD, CHF patient is presented to the ED for evaluation of abnormal labs. Has been recently started on increased diuresis by cardiology. Had labs done on an outpatient basis was found to have worsened renal function compared to baseline and so was referred to the ED for evaluation. Patient has no complaints aside from generalized fatigue. On my evaluation is resting comfortably no acute distress, normal heart sounds, normal lung sounds, abdomen soft nontender nondistended, 5 out of 5 muscle strength in all extremities, gross intact incisional light touch in all extremities, cranial nerves II through XII are grossly intact. Plan will be for labs and likely admission to the hospital for better evaluation. Labs show no acute electrolyte abnormalities, renal function is impaired as previously noted, no leukocytosis, stable hemoglobin, urine is without concern for acute infection. Admitting team consulted. After their evaluation, accepted onto their service for further care and management.   Stable at the time of disposition    ED Course         Critical Care Time  Procedures

## 2023-08-17 ENCOUNTER — APPOINTMENT (INPATIENT)
Dept: ULTRASOUND IMAGING | Facility: HOSPITAL | Age: 58
End: 2023-08-17
Payer: COMMERCIAL

## 2023-08-17 LAB
ANION GAP SERPL CALCULATED.3IONS-SCNC: 9 MMOL/L
BASOPHILS # BLD AUTO: 0.01 THOUSANDS/ÂΜL (ref 0–0.1)
BASOPHILS NFR BLD AUTO: 0 % (ref 0–1)
BUN SERPL-MCNC: 66 MG/DL (ref 5–25)
CALCIUM SERPL-MCNC: 8.5 MG/DL (ref 8.4–10.2)
CHLORIDE SERPL-SCNC: 97 MMOL/L (ref 96–108)
CO2 SERPL-SCNC: 33 MMOL/L (ref 21–32)
CREAT SERPL-MCNC: 4.02 MG/DL (ref 0.6–1.3)
EOSINOPHIL # BLD AUTO: 0.05 THOUSAND/ÂΜL (ref 0–0.61)
EOSINOPHIL NFR BLD AUTO: 2 % (ref 0–6)
ERYTHROCYTE [DISTWIDTH] IN BLOOD BY AUTOMATED COUNT: 15.4 % (ref 11.6–15.1)
GFR SERPL CREATININE-BSD FRML MDRD: 11 ML/MIN/1.73SQ M
GLUCOSE SERPL-MCNC: 96 MG/DL (ref 65–140)
HCT VFR BLD AUTO: 33.5 % (ref 34.8–46.1)
HGB BLD-MCNC: 10.3 G/DL (ref 11.5–15.4)
IMM GRANULOCYTES # BLD AUTO: 0.01 THOUSAND/UL (ref 0–0.2)
IMM GRANULOCYTES NFR BLD AUTO: 0 % (ref 0–2)
LYMPHOCYTES # BLD AUTO: 0.94 THOUSANDS/ÂΜL (ref 0.6–4.47)
LYMPHOCYTES NFR BLD AUTO: 27 % (ref 14–44)
MCH RBC QN AUTO: 25.9 PG (ref 26.8–34.3)
MCHC RBC AUTO-ENTMCNC: 30.7 G/DL (ref 31.4–37.4)
MCV RBC AUTO: 84 FL (ref 82–98)
MONOCYTES # BLD AUTO: 0.25 THOUSAND/ÂΜL (ref 0.17–1.22)
MONOCYTES NFR BLD AUTO: 7 % (ref 4–12)
NEUTROPHILS # BLD AUTO: 2.18 THOUSANDS/ÂΜL (ref 1.85–7.62)
NEUTS SEG NFR BLD AUTO: 64 % (ref 43–75)
NRBC BLD AUTO-RTO: 0 /100 WBCS
PLATELET # BLD AUTO: 109 THOUSANDS/UL (ref 149–390)
PMV BLD AUTO: 12.7 FL (ref 8.9–12.7)
POTASSIUM SERPL-SCNC: 3.2 MMOL/L (ref 3.5–5.3)
RBC # BLD AUTO: 3.98 MILLION/UL (ref 3.81–5.12)
SODIUM SERPL-SCNC: 139 MMOL/L (ref 135–147)
WBC # BLD AUTO: 3.44 THOUSAND/UL (ref 4.31–10.16)

## 2023-08-17 PROCEDURE — 80048 BASIC METABOLIC PNL TOTAL CA: CPT | Performed by: STUDENT IN AN ORGANIZED HEALTH CARE EDUCATION/TRAINING PROGRAM

## 2023-08-17 PROCEDURE — 76775 US EXAM ABDO BACK WALL LIM: CPT

## 2023-08-17 PROCEDURE — 99232 SBSQ HOSP IP/OBS MODERATE 35: CPT | Performed by: HOSPITALIST

## 2023-08-17 PROCEDURE — 85025 COMPLETE CBC W/AUTO DIFF WBC: CPT | Performed by: STUDENT IN AN ORGANIZED HEALTH CARE EDUCATION/TRAINING PROGRAM

## 2023-08-17 RX ORDER — POTASSIUM CHLORIDE 20 MEQ/1
40 TABLET, EXTENDED RELEASE ORAL ONCE
Status: COMPLETED | OUTPATIENT
Start: 2023-08-17 | End: 2023-08-17

## 2023-08-17 RX ORDER — DOXAZOSIN MESYLATE 1 MG/1
2 TABLET ORAL
Status: DISCONTINUED | OUTPATIENT
Start: 2023-08-17 | End: 2023-08-19 | Stop reason: HOSPADM

## 2023-08-17 RX ORDER — DOXAZOSIN MESYLATE 1 MG/1
2 TABLET ORAL
Status: DISCONTINUED | OUTPATIENT
Start: 2023-08-17 | End: 2023-08-17

## 2023-08-17 RX ORDER — LIDOCAINE 50 MG/G
1 PATCH TOPICAL DAILY
Status: DISCONTINUED | OUTPATIENT
Start: 2023-08-17 | End: 2023-08-19 | Stop reason: HOSPADM

## 2023-08-17 RX ORDER — SODIUM CHLORIDE, SODIUM GLUCONATE, SODIUM ACETATE, POTASSIUM CHLORIDE, MAGNESIUM CHLORIDE, SODIUM PHOSPHATE, DIBASIC, AND POTASSIUM PHOSPHATE .53; .5; .37; .037; .03; .012; .00082 G/100ML; G/100ML; G/100ML; G/100ML; G/100ML; G/100ML; G/100ML
125 INJECTION, SOLUTION INTRAVENOUS CONTINUOUS
Status: DISCONTINUED | OUTPATIENT
Start: 2023-08-17 | End: 2023-08-18

## 2023-08-17 RX ORDER — ACETAMINOPHEN 325 MG/1
975 TABLET ORAL EVERY 6 HOURS PRN
Status: DISCONTINUED | OUTPATIENT
Start: 2023-08-17 | End: 2023-08-17

## 2023-08-17 RX ADMIN — ACETAMINOPHEN 975 MG: 325 TABLET, FILM COATED ORAL at 14:24

## 2023-08-17 RX ADMIN — POTASSIUM CHLORIDE 40 MEQ: 1500 TABLET, EXTENDED RELEASE ORAL at 09:54

## 2023-08-17 RX ADMIN — DICLOFENAC SODIUM TOPICAL GEL, 1% 2 G: 10 GEL TOPICAL at 05:03

## 2023-08-17 RX ADMIN — SODIUM CHLORIDE, SODIUM GLUCONATE, SODIUM ACETATE, POTASSIUM CHLORIDE AND MAGNESIUM CHLORIDE 125 ML/HR: 526; 502; 368; 37; 30 INJECTION, SOLUTION INTRAVENOUS at 14:26

## 2023-08-17 RX ADMIN — RIVAROXABAN 15 MG: 15 TABLET, FILM COATED ORAL at 17:37

## 2023-08-17 RX ADMIN — LIDOCAINE 1 PATCH: 50 PATCH CUTANEOUS at 12:08

## 2023-08-17 RX ADMIN — PANTOPRAZOLE SODIUM 40 MG: 40 TABLET, DELAYED RELEASE ORAL at 04:55

## 2023-08-17 RX ADMIN — ACETAMINOPHEN 975 MG: 325 TABLET, FILM COATED ORAL at 04:55

## 2023-08-17 RX ADMIN — NICOTINE 7 MG: 7 PATCH, EXTENDED RELEASE TRANSDERMAL at 08:12

## 2023-08-17 RX ADMIN — SODIUM CHLORIDE, SODIUM GLUCONATE, SODIUM ACETATE, POTASSIUM CHLORIDE AND MAGNESIUM CHLORIDE 125 ML/HR: 526; 502; 368; 37; 30 INJECTION, SOLUTION INTRAVENOUS at 19:05

## 2023-08-17 NOTE — CASE MANAGEMENT
Case Management Assessment    Patient name Mariana Montgomery  Location S /S -90 MRN [id number]  : 1965 Date 2023       Current Admission Date: 2023  Current Admission Diagnosis:Acute kidney injury superimposed on CKD [facility])   Patient Active Problem List    Diagnosis Date Noted   • Thrombocytopenia ([address]) 2023   • Hypokalemia 2023   • Bilateral lower extremity edema 2023   • Blood in stool 2023   • Abnormal finding on CT scan 2023   • Acute kidney injury superimposed on CKD ([address]) 2023   • Morbid obesity 2023   • Anemia 2023   • Left low back pain 2022   • Chronic heart failure with preserved ejection fraction (HFpEF) ([address]) 10/13/2022   • Renal cyst 2022   • Diverticulitis of large intestine without perforation or abscess 2022   • Leucopenia 2022   • Nephrolithiasis 2022   • Essential hypertension 2022   • Chronic kidney disease stage 3  2022   • Hypertensive urgency 2022   • Hepatitis C 2022   • Acute right flank pain 08/15/2021   • History of tachycardia induced cardiomyopathy 2021   • Paroxysmal A-fib ([address]) 2020   • Cocaine abuse ([address]) 2020   • Elevated lipase 10/10/2019   • Elevated troponin 2019   • Acute on chronic diastolic CHF (congestive heart failure) ([address]) 2019   • Headache 2018   • Tobacco abuse 2018   • History of monomorphic ventricular tachycardia, s/p ICD in 2016 2016   • Gastroesophageal reflux disease  2016   • Atypical atrial flutter ([address]) 2016      LOS (days): 1  Geometric Mean LOS (GMLOS) (days):   Days to GMLOS:     OBJECTIVE:  PATIENT READMITTED TO HOSPITAL  Risk of Unplanned Readmission Score: 67.23         Current admission status: Inpatient       Preferred Pharmacy:   [address] [place] [address] Katarina Marc, [address] [place] [address] [place] [address]  Phone: [phone] Fax: [phone]    Primary Care Provider: Billie Murrieta MD    Primary Insurance: Meryl Salinas  Secondary Insurance:     ASSESSMENT:  [address] [facility] Representative - Spouse   Primary Phone: [phone] (Mobile)  Home Phone: [phone]                              Patient Information  Admitted from[de-identified] Home  Mental Status: Alert  During Assessment patient was accompanied by: Not accompanied during assessment  Assessment information provided by[de-identified] Patient  Primary Caregiver: Self  Support Systems: Self, Spouse/significant other  [place] of Residence: [address] do you live in?: [facility] entry access options.  Select all that apply.: Stairs  Number of steps to enter home.: 3  Type of Current Residence: 2 story home  Upon entering residence, is there a bedroom on the main floor (no further steps)?: No  A bedroom is located on the following floor levels of residence (select all that apply):: 2nd Floor  Upon entering residence, is there a bathroom on the main floor (no further steps)?: Yes  Number of steps to 2nd floor from main floor: One Flight  In the last 12 months, was there a time when you were not able to pay the mortgage or rent on time?: No  In the last 12 months, was there a time when you did not have a steady place to sleep or slept in a shelter (including now)?: No  Homeless/housing insecurity resource given?: N/A  Living Arrangements: Lives w/ Spouse/significant other    Activities of Daily Living Prior to Admission  Functional Status: Independent  Completes ADLs independently?: Yes  Ambulates independently?: Yes  Does patient use assisted devices?: No  Does patient currently own DME?: Yes  What DME does the patient currently own?: Bedside Commode, Shower Chair, Straight Cane, Walker, Wheelchair  Does patient have a history of Outpatient Therapy (PT/OT)?: Yes  Does the patient have a history of Short-Term Rehab?: No  Does patient have a history of [address]?: No  Does patient currently have [address]?: No         Patient Information Continued  Within the past 12 months, you worried that your food would run out before you got the money to buy more.: Never true  Within the past 12 months, the food you bought just didn't last and you didn't have money to get more.: Never true  Food insecurity resource given?: N/A         Means of Transportation  Means of Transport to Appts[de-identified] Drives Self  In the past 12 months, has lack of transportation kept you from medical appointments or from getting medications?: No  In the past 12 months, has lack of transportation kept you from meetings, work, or from getting things needed for daily living?: No  Was application for public transport provided?: N/A    CM met with patient re: assessment. Pt lives with spouse in two story home, 3 [address], first floor bath, second floor bedroom (pt can stay on first level if needed). Pt independent with ADLS and and does not use any dme at baseline. Pt relayed hx of OPPT and drives self to appointments. Confirmed PCP Izabela Gregg) and preferred pharmacy [facility]). Pt does not currently have healthcare POA - declined further advanced directive info when offered. Pt relayed spouse to provide transport home when ready for d/c. No CM d/c needs currently identified, CM remains available.

## 2023-08-17 NOTE — UTILIZATION REVIEW
Initial Clinical Review    Admission: Date/Time/Statement:   Admission Orders (From admission, onward)     Ordered        08/16/23 1937  INPATIENT ADMISSION  Once                      Orders Placed This Encounter   Procedures   • INPATIENT ADMISSION     Standing Status:   Standing     Number of Occurrences:   1     Order Specific Question:   Level of Care     Answer:   Med Surg [16]     Order Specific Question:   Estimated length of stay     Answer:   More than 2 Midnights     Order Specific Question:   Certification     Answer:   I certify that inpatient services are medically necessary for this patient for a duration of greater than two midnights. See H&P and MD Progress Notes for additional information about the patient's course of treatment. ED Arrival Information     Expected   8/16/2023     Arrival   8/16/2023 16:48    Acuity   Urgent            Means of arrival   Walk-In    Escorted by   Friend    Service   Hospitalist    Admission type   Emergency            Arrival complaint   Paroxysmal A-fib [facility])           Chief Complaint   Patient presents with   • Abnormal Lab     Patient sent in for eval for elevated creatinine of 4.69. hx of kidney disease and known kidney stone on left side       Initial Presentation: 62 y.o. female presents to the ED from cardio office with elevated Creat 4.69 up from baseline 21.-2.3, new onset vertigo, dizziness, room spinning. PMH: PAF on Xarelto s/p ablation 5/23, CM w/ recovered HF, VT s/p ICD, HTN, CKD4, obesity. In the ED labs - low K, elevated creat. Treated with IV fluids, Tylenol. On exam no acute disease. She is admitted to INPATIENT status with DANIELA on CKD - no urgent RRT, IV fluids, PVR w/ renal US, hold diuretics, daily wt, daily BMP. Anemia - iron supplementation, trend H/H. Hypokalemia - replete, trend. HTN - hold Torsemide, Metolazone. Date: 8/17   Day 2:   Hgb down to 10.3, K 3.2, slight decrease BUN/Cr, VS stable. Continue K repletion, trending. Continue Iron supplementation. On exam not volume overloaded. Reports presence of headache, episodic left flank pain that radiates to the anterior abdomen, shortness of breath on exertion at the time of exam.  Continue holding Torsemide and Metolazone and continue Doxazosin HS. Continue IV fluids. No RRT planned.       ED Triage Vitals   Temperature Pulse Respirations Blood Pressure SpO2   08/16/23 1652 08/16/23 1652 08/16/23 1652 08/16/23 1652 08/16/23 1652   98.1 °F (36.7 °C) 60 20 151/81 97 %      Temp Source Heart Rate Source Patient Position - Orthostatic VS BP Location FiO2 (%)   08/16/23 1652 08/16/23 1652 08/16/23 2031 08/16/23 1652 --   Oral Monitor Lying Left arm       Pain Score       08/16/23 1652       8          Wt Readings from Last 1 Encounters:   08/17/23 109 kg (241 lb 2.9 oz)     Additional Vital Signs:   08/17/23 07:33:01 97.1 °F (36.2 °C) Abnormal  61 18 101/68 77 95 % None (Room air) Lying   08/16/23 23:16:14 98.3 °F (36.8 °C) 60 18 107/55 73 95 % None (Room air) Lying   08/16/23 2031 97.5 °F (36.4 °C) 60 18 113/71 85 96 % None (Room air) Lying     Pertinent Labs/Diagnostic Test Results:     8/16 ECG - ventricular pacing @ 70 BPM.     US kidney and bladder      No acute urinary tract pathology         Results from last 7 days   Lab Units 08/17/23  0540 08/16/23 1815 08/12/23 0443 08/11/23 0459   WBC Thousand/uL 3.44* 6.20 3.58* 3.16*   HEMOGLOBIN g/dL 10.3* 11.0* 11.7 10.7*   HEMATOCRIT % 33.5* 36.2 37.4 34.9   PLATELETS Thousands/uL 109* 138* 156 146*   NEUTROS ABS Thousands/µL 2.18 4.61  --   --          Results from last 7 days   Lab Units 08/17/23  0540 08/16/23 1815 08/16/23  0937 08/12/23 0443 08/11/23 0459   SODIUM mmol/L 139 138 135 134* 137   POTASSIUM mmol/L 3.2* 3.2* 3.7 4.0 4.0   CHLORIDE mmol/L 97 93* 96 93* 98   CO2 mmol/L 33* 32 33* 34* 32   ANION GAP mmol/L 9 13 6 7 7   BUN mg/dL 66* 68* 66* 30* 27*   CREATININE mg/dL 4.02* 4.46* 4.69* 2.37* 2.14*   EGFR ml/min/1.73sq m 11 10 9 21 24   CALCIUM mg/dL 8.5 8.9 9.5 9.4 8.9   MAGNESIUM mg/dL  --   --   --  2.1 2.3     Results from last 7 days   Lab Units 08/16/23  1815   AST U/L 28   ALT U/L 18   ALK PHOS U/L 72   TOTAL PROTEIN g/dL 7.7   ALBUMIN g/dL 4.1   TOTAL BILIRUBIN mg/dL 0.44         Results from last 7 days   Lab Units 08/17/23  0540 08/16/23  1815 08/12/23  0443 08/11/23  0459   GLUCOSE RANDOM mg/dL 96 131 98 95     Results from last 7 days   Lab Units 08/16/23  1815   CLARITY UA  Clear   COLOR UA  Light Yellow   SPEC GRAV UA  1.010   PH UA  5.5   GLUCOSE UA mg/dl Negative   KETONES UA mg/dl Negative   BLOOD UA  Negative   PROTEIN UA mg/dl Negative   NITRITE UA  Negative   BILIRUBIN UA  Negative   UROBILINOGEN UA (BE) mg/dl <2.0   LEUKOCYTES UA  Trace*   WBC UA /hpf 1-2   RBC UA /hpf 1-2   BACTERIA UA /hpf Occasional   EPITHELIAL CELLS WET PREP /hpf Occasional     ED Treatment:   Medication Administration from 08/16/2023 1643 to 08/16/2023 2020       Date/Time Order Dose Route Action     08/16/2023 1816 EDT sodium chloride 0.9 % bolus 500 mL 500 mL Intravenous New Bag     08/16/2023 1805 EDT acetaminophen (TYLENOL) tablet 975 mg 975 mg Oral Given        Past Medical History:   Diagnosis Date   • ACS (acute coronary syndrome) ([address]) 02/07/2021   • Acute on chronic diastolic CHF 17/95/9770   • Anemia 1/14/2023   • Arthritis    • Atrial fibrillation (HCC)    • Atrial fibrillation with rapid ventricular response (HCC) 03/20/2016   • Breast lump    • CKD (chronic kidney disease) stage 3, GFR 30-59 ml/min (HCC)    • Disease of thyroid gland    • Elevated troponin 09/09/2019   • Epigastric abdominal tenderness 08/15/2021   • 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 ([address]) 05/25/2020   • GERD (gastroesophageal reflux disease)    • H/O transfusion 1987   • Hepatitis C     resolved   • History of tachycardia induced cardiomyopathy 05/2021    in setting of rapid atrial flutter in 05/2021 and again 06/2022   • History of ventricular tachycardia 07/2016    s/p ICD   • Hyperlipidemia    • Hypertension    • Hypokalemia 7/23/2023   • Inappropriate ICD discharge for atrial flutter 04/2023   • NSTEMI (non-ST elevated myocardial infarction) ([address]) 12/26/2018   • Sleep apnea     no cpap     Present on Admission:  • Acute kidney injury superimposed on CKD ([address])  • Anemia  • Chronic heart failure with preserved ejection fraction (HFpEF) ([facility])  • Essential hypertension  • Gastroesophageal reflux disease   • Hypokalemia  • Paroxysmal A-fib ([facility])  • Tobacco abuse      Admitting Diagnosis: Hypokalemia [E87.6]  Acute kidney injury ([address]) [N17.9]  Age/Sex: 62 y.o. female  Admission Orders:  Scheduled Medications:  acetaminophen, 975 mg, Oral, Q8H  doxazosin, 2 mg, Oral, HS  ferrous gluconate, 324 mg, Oral, Once per day on Mon Wed Fri  metoprolol succinate, 50 mg, Oral, Daily  nicotine, 7 mg, Transdermal, Daily  pantoprazole, 40 mg, Oral, Early Morning  rivaroxaban, 15 mg, Oral, QPM      Continuous IV Infusions:  IV fluids @ 125 ml/hr d/c 8/17     PRN Meds:  Diclofenac Sodium, 2 g, Topical, Q6H PRN - x 1 8/17    Monitor urinary retention, check PVR  Daily wt  US kidney/bladder  IP CONSULT TO CASE MANAGEMENT    Network Utilization Review Department  ATTENTION: Please call with any questions or concerns to [phone] and carefully listen to the prompts so that you are directed to the right person. All voicemails are confidential.  Mich Rivera all requests for admission clinical reviews, approved or denied determinations and any other requests to dedicated fax number below belonging to the campus where the patient is receiving treatment.  List of dedicated fax numbers for the Facilities:  [place] DENIALS (Administrative/Medical Necessity) [phone]   [address] [facility] (Maternity/NICU/Pediatrics) [phone]   [address] [place] [address] Drive [phone]   [place] [phone] [address] [facility] [address] [facility] [address] [place] [address] [address]  [phone]

## 2023-08-17 NOTE — PROGRESS NOTES
[facility]  Progress Note  Name: Poppy Nolasco  MRN: [id number]  Unit/Bed#: S -01 I Date of Admission: 8/16/2023   Date of Service: 8/17/2023 I Hospital Day: 1    Assessment/Plan   Hypokalemia  Assessment & Plan  Potassium on admission 3.2. Patient is on standing potassium replacement at home. Potassium on the morning of 8/17 remained at 3.2 after repletion    Plan:  · Gave another 40 mEq of potassium chloride p.o. this morning 8/17 for potassium of 3.2  · Continue to Trend potassium with daily BMP and replace potassium as needed    Anemia  Assessment & Plan  History of normocytic anemia presenting with hemoglobin 11.0 from baseline 10-11. Recent iron studies from 5/29/23 showed iron 22, ferritin 64, iron saturation 5, TIBC 420, folate 15.9. No signs of acute blood loss at this time. Hemoglobin on 8/17 was 10.3    Plan:  · Continue PO iron supplementation with ferrous gluconate 325 mg once per day on Monday Wednesday Friday  · Monitor for signs of blood loss; transfuse for hemoglobin <7    Chronic heart failure with preserved ejection fraction (HFpEF) ([facility])  Assessment & Plan  Wt Readings from Last 3 Encounters:   08/16/23 109 kg (240 lb)   08/12/23 109 kg (239 lb 3.2 oz)   07/25/23 107 kg (235 lb 9.6 oz)       Previously with reduced EF 2/2 tachycardia mediated cardiomyopathy. In [place] [address], patient was hospitalized for afib with RVR when TTE showed an EF 30%. Most recent TTE 8/9/23 shows recovered EF 70%. Had nonobstructive CAD on [facility] in January 2019. Follows with cardiology. Outpatient GDMT is Toprol 50 mg daily. Of note, she was recently hospitalized and discharged 8/12 with increased diuretic regimen consisting of torsemide 40 mg BID and metolazone 2 mg weekly. She was 239 lbs at discharge and is now currently 240. She is not volume overloaded on presentation at this time.     Plan:  · Holding torsemide and metolazone in the setting of DANIELA  · GDMT:   · Continue metoprolol 50 mg  · Not on ARNI, SGLT2i, or MRA given her CKD   · Monitor intake/output, daily weights      Essential hypertension  Assessment & Plan  Home antihypertensives include doxazosin, metolazone, metoprolol succinate, and torsemide. Most recent blood pressure on 8/17 was oh 108/60    Plan:  · Continue metoprolol 50 mg daily  · Hold torsemide and metolazone in the setting of DANIELA  · Continue doxazosin 2 mg nightly with holding parameters of systolic below 181 in order to avoid Hypoperfusion of kidneys in the setting of DANIELA    Paroxysmal A-fib [facility])  Assessment & Plan  S/p AV node ablation 5/10/23  Ventricular pacemaker in place. CHADS-VASc is 4. Plan:  · Continue AC w/ Xarelto 15 mg daily   · Continue metoprolol 50 mg daily     Tobacco abuse  Assessment & Plan  Plan:  · Nicotine 7 mg patch provided    History of monomorphic ventricular tachycardia, s/p ICD in 2016  Assessment & Plan  At cardiology office visit today 8/16, requested if [employer] can reprogram her device while she is admitted. Plan:  - Continue metoprolol 50 mg daily  -Will attempt to follow up on [employer] during her admission    Gastroesophageal reflux disease   Assessment & Plan  History of GERD    Plan:  Continue pantoprazole 40 mg daily    * Acute kidney injury superimposed on CKD [facility])  Assessment & Plan  Lab Results   Component Value Date    EGFR 11 08/17/2023    EGFR 10 08/16/2023    EGFR 9 08/16/2023    CREATININE 4.02 (H) 08/17/2023    CREATININE 4.46 (H) 08/16/2023    CREATININE 4.69 (H) 08/16/2023     63 y/o female with a history of CKD4 presents after outpatient labs revealed creatinine 4.69 from baseline 2.1-2.3. Of note, she was recently discharged from the hospital on 8/12 with escalated diuretic regimen consisting of torsemide 40 mg twice daily and metolazone 2.5 mg once weekly (initially on torsemide 60 mg daily at admission). BUN/Cr ratio currently is ~15. Denies NSAID use or other new nephrotoxic medications.  Patient appears euvolemic to hypovolemic on exam. Suspect etiology of her DANIELA is prerenal due to overdiuresis and dehydration. As of 8/17 patient's creatinine continues to down trended now at 4.02  KUB 8/17 showed No acute urinary tract pathology. Secondary to this unremarkable KUB, suspicion of renal stone has decreased    Plan:  · No indication for urgent dialysis at this time   · Continue isolyte maintenance rate at 125cc/hr over 12 hours  · Hold diuretics for now and other nephrotoxic agents  · Monitor intake/output, daily weights   · Monitor renal function; daily BMP   · Continue retention protocol  · Continue doxazosin 2 mg nightly with holding parameters of systolic below 011 in order to avoid Hypoperfusion of kidneys in the setting of DANIELA             VTE Pharmacologic Prophylaxis: VTE Score: 3 Moderate Risk (Score 3-4) - Pharmacological DVT Prophylaxis Ordered: rivaroxaban (Xarelto). Patient Centered Rounds: I performed bedside rounds with nursing staff today. Discussions with Specialists or Other Care Team Provider: Attending, senior resident    Education and Discussions with Family / Patient: Will attempt to call family later today    Current Length of Stay: 1 day(s)  Current Patient Status: Inpatient   Discharge Plan: Anticipate discharge in 48 hrs to home. Code Status: Level 1 - Full Code    Subjective:   Patient was seen and examined at bedside today. Patient was calm and cooperative with examination. Patient was completing a bedside KUB at the time of examination. Patient reported that when ultrasound probe pressed into her posterior left mid back she felt a shooting pain. The shooting pain was the same pain she felt prior to admission.   Patient reports presence of headache, episodic left flank pain that radiates to the anterior abdomen, shortness of breath on exertion at the time of exam.  Patient denies chest pain, palpitations, abdominal pain, constipation, blood in stool, hematuria, pain on urination, lightheadedness and dizziness at rest.     Objective:     Vitals:   Temp (24hrs), Av.8 °F (36.6 °C), Min:97.1 °F (36.2 °C), Max:98.3 °F (36.8 °C)    Temp:  [97.1 °F (36.2 °C)-98.3 °F (36.8 °C)] 97.1 °F (36.2 °C)  HR:  [60-61] 61  Resp:  [18-20] 18  BP: (101-151)/(55-81) 101/68  SpO2:  [95 %-97 %] 95 %  Body mass index is 37.77 kg/m². Input and Output Summary (last 24 hours): Intake/Output Summary (Last 24 hours) at 2023 1448  Last data filed at 2023 1208  Gross per 24 hour   Intake 240 ml   Output --   Net 240 ml       Physical Exam:   Physical Exam  Constitutional:       General: She is not in acute distress. Cardiovascular:      Pulses: Normal pulses. Comments:  no overt abnormal heart sounds appreciated however unable to effectively auscultate secondary to body habitus  Pulmonary:      Effort: Pulmonary effort is normal.      Breath sounds: Normal breath sounds. Abdominal:      General: Bowel sounds are normal.      Comments: Tenderness to palpation on left abdomen   Musculoskeletal:         General: No tenderness. Right lower leg: No edema. Left lower leg: No edema. Comments: Tender to palpation on posterior left mid back at level of kidney   Skin:     Capillary Refill: Capillary refill takes less than 2 seconds. Neurological:      Mental Status: She is alert.           Additional Data:     Labs:  Results from last 7 days   Lab Units 23  0540   WBC Thousand/uL 3.44*   HEMOGLOBIN g/dL 10.3*   HEMATOCRIT % 33.5*   PLATELETS Thousands/uL 109*   NEUTROS PCT % 64   LYMPHS PCT % 27   MONOS PCT % 7   EOS PCT % 2     Results from last 7 days   Lab Units 23  0540 23  1815   SODIUM mmol/L 139 138   POTASSIUM mmol/L 3.2* 3.2*   CHLORIDE mmol/L 97 93*   CO2 mmol/L 33* 32   BUN mg/dL 66* 68*   CREATININE mg/dL 4.02* 4.46*   ANION GAP mmol/L 9 13   CALCIUM mg/dL 8.5 8.9   ALBUMIN g/dL  --  4.1   TOTAL BILIRUBIN mg/dL  --  0.44   ALK PHOS U/L  --  72   ALT U/L  -- 18   AST U/L  --  28   GLUCOSE RANDOM mg/dL 96 131                       Lines/Drains:  Invasive Devices     Peripheral Intravenous Line  Duration           Peripheral IV 08/16/23 Left Antecubital <1 day    Peripheral IV 08/17/23 Right;Ventral (anterior) Forearm <1 day                      Imaging: Reviewed radiology reports from this admission including: KUB    Recent Cultures (last 7 days):         Last 24 Hours Medication List:   Current Facility-Administered Medications   Medication Dose Route Frequency Provider Last Rate   • acetaminophen  975 mg Oral Q8H Natty Ventura DO     • acetaminophen  975 mg Oral Q6H PRN Irais Jimenez DO     • Diclofenac Sodium  2 g Topical Q6H PRN Natty Ventura DO     • doxazosin  2 mg Oral HS Naren Al MD     • ferrous gluconate  324 mg Oral Once per day on Mon Wed Fri Natty Ventura DO     • lidocaine  1 patch Topical Daily Naren Al MD     • metoprolol succinate  50 mg Oral Daily Natty Ventura DO     • multi-electrolyte  125 mL/hr Intravenous Continuous Naren Al  mL/hr (08/17/23 1426)   • nicotine  7 mg Transdermal Daily Natty Ventura DO     • pantoprazole  40 mg Oral Early Morning Natty Ventura DO     • rivaroxaban  15 mg Oral QPM Natty Ventura DO          Today, Patient Was Seen By: Irais Jimenez DO    **Please Note: This note may have been constructed using a voice recognition system. **

## 2023-08-17 NOTE — ASSESSMENT & PLAN NOTE
Potassium on admission 3.2. Patient is on standing potassium replacement at home.    Potassium on the morning of 8/17 remained at 3.2 after repletion    Plan:  · Gave another 40 mEq of potassium chloride p.o. this morning 8/17 for potassium of 3.2  · Continue to Trend potassium with daily BMP and replace potassium as needed

## 2023-08-17 NOTE — ASSESSMENT & PLAN NOTE
Lab Results   Component Value Date    EGFR 11 08/17/2023    EGFR 10 08/16/2023    EGFR 9 08/16/2023    CREATININE 4.02 (H) 08/17/2023    CREATININE 4.46 (H) 08/16/2023    CREATININE 4.69 (H) 08/16/2023     61 y/o female with a history of CKD4 presents after outpatient labs revealed creatinine 4.69 from baseline 2.1-2.3. Of note, she was recently discharged from the hospital on 8/12 with escalated diuretic regimen consisting of torsemide 40 mg twice daily and metolazone 2.5 mg once weekly (initially on torsemide 60 mg daily at admission). BUN/Cr ratio currently is ~15. Denies NSAID use or other new nephrotoxic medications. Patient appears euvolemic to hypovolemic on exam. Suspect etiology of her DANIELA is prerenal due to overdiuresis and dehydration. As of 8/17 patient's creatinine continues to down trended now at 4.02  KUB 8/17 showed No acute urinary tract pathology.   Secondary to this unremarkable KUB, suspicion of renal stone has decreased    Plan:  · No indication for urgent dialysis at this time   · Continue isolyte maintenance rate at 125cc/hr over 12 hours  · Hold diuretics for now and other nephrotoxic agents  · Monitor intake/output, daily weights   · Monitor renal function; daily BMP   · Continue retention protocol  · Continue doxazosin 2 mg nightly with holding parameters of systolic below 565 in order to avoid Hypoperfusion of kidneys in the setting of DANIELA

## 2023-08-17 NOTE — ASSESSMENT & PLAN NOTE
Wt Readings from Last 3 Encounters:   08/16/23 109 kg (240 lb)   08/12/23 109 kg (239 lb 3.2 oz)   07/25/23 107 kg (235 lb 9.6 oz)       Previously with reduced EF 2/2 tachycardia mediated cardiomyopathy. In [place] [address], patient was hospitalized for afib with RVR when TTE showed an EF 30%. Most recent TTE 8/9/23 shows recovered EF 70%. Had nonobstructive CAD on [facility] in January 2019. Follows with cardiology. Outpatient GDMT is Toprol 50 mg daily. Of note, she was recently hospitalized and discharged 8/12 with increased diuretic regimen consisting of torsemide 40 mg BID and metolazone 2 mg weekly. She was 239 lbs at discharge and is now currently 240. She is not volume overloaded on presentation at this time.     Plan:  · Holding torsemide and metolazone in the setting of DANIELA  · GDMT:   · Continue metoprolol 50 mg  · Not on ARNI, SGLT2i, or MRA given her CKD   · Monitor intake/output, daily weights

## 2023-08-17 NOTE — UTILIZATION REVIEW
NOTIFICATION OF INPATIENT ADMISSION   AUTHORIZATION REQUEST   SERVICING FACILITY:   [address] [place] [address]. [facility] [address] [place] [address]  Tax ID: [id number]  NPI: [id number]   ATTENDING PROVIDER:  Attending Name and NPI#: Francisco Navarro Md [[id number]]  Address: [address] [place] [address]. [facility], [address] [place] [address]  Phone: [phone]     ADMISSION INFORMATION:  [facility]   Place of Service Code: 21  Inpatient Admission Date/Time: 8/16/23  7:37 PM  Discharge Date/Time: No discharge date for patient encounter. Admitting Diagnosis Code/Description:  Hypokalemia [E87.6]  Acute kidney injury ([address]) [N17.9]     UTILIZATION REVIEW CONTACT:  Mackenzie Andre Utilization   Network Utilization Review Department  Phone: [phone]  Fax: [phone]  Email: [email or web address]  Contact for approvals/pending authorizations, clinical reviews, and discharge. PHYSICIAN ADVISORY SERVICES:  Medical Necessity Denial & [place] Review  Phone: [phone]  Fax: [phone]  Email: [email or web address]

## 2023-08-17 NOTE — ASSESSMENT & PLAN NOTE
Home antihypertensives include doxazosin, metolazone, metoprolol succinate, and torsemide.    Most recent blood pressure on 8/17 was oh 108/60    Plan:  · Continue metoprolol 50 mg daily  · Hold torsemide and metolazone in the setting of DANIELA  · Continue doxazosin 2 mg nightly with holding parameters of systolic below 549 in order to avoid Hypoperfusion of kidneys in the setting of DANIELA

## 2023-08-17 NOTE — ASSESSMENT & PLAN NOTE
History of normocytic anemia presenting with hemoglobin 11.0 from baseline 10-11. Recent iron studies from 5/29/23 showed iron 22, ferritin 64, iron saturation 5, TIBC 420, folate 15.9. No signs of acute blood loss at this time.    Hemoglobin on 8/17 was 10.3    Plan:  · Continue PO iron supplementation with ferrous gluconate 325 mg once per day on Monday Wednesday Friday  · Monitor for signs of blood loss; transfuse for hemoglobin <7

## 2023-08-17 NOTE — OCCUPATIONAL THERAPY NOTE
Occupational Therapy Screen     08/17/23 1105   OT Last Visit   OT Visit Date 08/17/23   Note Type   Note type Screen   Additional Comments OT consult received. Chart reviewd & PT reached out and reports pt to be (I). Upon OT arrival to pt's room, pt found ambulating t/o room (I)ly. Role of services explained to pt and pt feels no OT needs at this time. Will remove from caseload.  Thank you     Urvashi Elliott, OT
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
## 2023-08-17 NOTE — ASSESSMENT & PLAN NOTE
S/p AV node ablation 5/10/23  Ventricular pacemaker in place. CHADS-VASc is 4.      Plan:  · Continue AC w/ Xarelto 15 mg daily   · Continue metoprolol 50 mg daily

## 2023-08-17 NOTE — ASSESSMENT & PLAN NOTE
At cardiology office visit today 8/16, requested if [employer] can reprogram her device while she is admitted.      Plan:  - Continue metoprolol 50 mg daily  -Will attempt to follow up on [employer] during her admission

## 2023-08-17 NOTE — PHYSICAL THERAPY NOTE
Physical Therapy Screen Note     08/17/23 1020   Note Type   Note type Screen   Additional Comments referral received for PT eval and tx. Obed Teran reports pt is ambulating independently. pt states having no issues w/ mobilizing to the bathroom and has no concerns regarding discharge from the hospital from the mobility perspective. inpatient PT is not indicated due to pt's independent mobility status. discontinue PT. notified Obed Teran and [employer] CM of screening pt for inpatient PT.      Mariana Urena, PT

## 2023-08-18 LAB
ANION GAP SERPL CALCULATED.3IONS-SCNC: 8 MMOL/L
BUN SERPL-MCNC: 50 MG/DL (ref 5–25)
CALCIUM SERPL-MCNC: 8.7 MG/DL (ref 8.4–10.2)
CHLORIDE SERPL-SCNC: 101 MMOL/L (ref 96–108)
CO2 SERPL-SCNC: 30 MMOL/L (ref 21–32)
CREAT SERPL-MCNC: 2.77 MG/DL (ref 0.6–1.3)
ERYTHROCYTE [DISTWIDTH] IN BLOOD BY AUTOMATED COUNT: 15.3 % (ref 11.6–15.1)
GFR SERPL CREATININE-BSD FRML MDRD: 18 ML/MIN/1.73SQ M
GLUCOSE SERPL-MCNC: 112 MG/DL (ref 65–140)
HCT VFR BLD AUTO: 33.7 % (ref 34.8–46.1)
HGB BLD-MCNC: 10 G/DL (ref 11.5–15.4)
MCH RBC QN AUTO: 25.6 PG (ref 26.8–34.3)
MCHC RBC AUTO-ENTMCNC: 29.7 G/DL (ref 31.4–37.4)
MCV RBC AUTO: 86 FL (ref 82–98)
PLATELET # BLD AUTO: 111 THOUSANDS/UL (ref 149–390)
PMV BLD AUTO: 12.1 FL (ref 8.9–12.7)
POTASSIUM SERPL-SCNC: 3.8 MMOL/L (ref 3.5–5.3)
RBC # BLD AUTO: 3.91 MILLION/UL (ref 3.81–5.12)
SODIUM SERPL-SCNC: 139 MMOL/L (ref 135–147)
WBC # BLD AUTO: 3.3 THOUSAND/UL (ref 4.31–10.16)

## 2023-08-18 PROCEDURE — 80048 BASIC METABOLIC PNL TOTAL CA: CPT

## 2023-08-18 PROCEDURE — 85027 COMPLETE CBC AUTOMATED: CPT

## 2023-08-18 PROCEDURE — 99232 SBSQ HOSP IP/OBS MODERATE 35: CPT | Performed by: INTERNAL MEDICINE

## 2023-08-18 PROCEDURE — 99223 1ST HOSP IP/OBS HIGH 75: CPT | Performed by: INTERNAL MEDICINE

## 2023-08-18 RX ORDER — BUMETANIDE 0.25 MG/ML
2 INJECTION INTRAMUSCULAR; INTRAVENOUS ONCE
Status: COMPLETED | OUTPATIENT
Start: 2023-08-18 | End: 2023-08-18

## 2023-08-18 RX ADMIN — BUMETANIDE 2 MG: 0.25 INJECTION INTRAMUSCULAR; INTRAVENOUS at 12:09

## 2023-08-18 RX ADMIN — METOPROLOL SUCCINATE 50 MG: 50 TABLET, EXTENDED RELEASE ORAL at 08:49

## 2023-08-18 RX ADMIN — DOXAZOSIN 2 MG: 1 TABLET ORAL at 22:23

## 2023-08-18 RX ADMIN — ACETAMINOPHEN 975 MG: 325 TABLET, FILM COATED ORAL at 08:11

## 2023-08-18 RX ADMIN — RIVAROXABAN 15 MG: 15 TABLET, FILM COATED ORAL at 18:10

## 2023-08-18 RX ADMIN — FERROUS GLUCONATE 324 MG: 324 TABLET ORAL at 08:48

## 2023-08-18 RX ADMIN — ACETAMINOPHEN 975 MG: 325 TABLET, FILM COATED ORAL at 18:11

## 2023-08-18 RX ADMIN — PANTOPRAZOLE SODIUM 40 MG: 40 TABLET, DELAYED RELEASE ORAL at 05:27

## 2023-08-18 RX ADMIN — ACETAMINOPHEN 975 MG: 325 TABLET, FILM COATED ORAL at 23:06

## 2023-08-18 NOTE — PROGRESS NOTES
[id number] [facility]  Progress Note  Name: Demetri Shin  MRN: [id number]  Unit/Bed#: S -01 I Date of Admission: 8/16/2023   Date of Service: 8/18/2023 I Hospital Day: 2    Assessment/Plan   Hypokalemia  Assessment & Plan  Potassium on admission 3.2. Patient is on standing potassium replacement at home. Potassium on the morning of 8/18 was back within normal limits at 3.8  Plan:  · Continue to Trend potassium with daily BMP and replace potassium as needed    Anemia  Assessment & Plan  History of normocytic anemia presenting with hemoglobin 11.0 from baseline 10-11. Recent iron studies from 5/29/23 showed iron 22, ferritin 64, iron saturation 5, TIBC 420, folate 15.9. No signs of acute blood loss at this time. Hemoglobin on 8/18 was 10.0    Plan:  · Continue PO iron supplementation with ferrous gluconate 325 mg once per day on Monday Wednesday Friday  · Monitor for signs of blood loss; transfuse for hemoglobin <7    Chronic heart failure with preserved ejection fraction (HFpEF) ([facility])  Assessment & Plan  Wt Readings from Last 3 Encounters:   08/18/23 114 kg (251 lb 5.2 oz)   08/16/23 109 kg (240 lb)   08/12/23 109 kg (239 lb 3.2 oz)       Previously with reduced EF 2/2 tachycardia mediated cardiomyopathy. In [place] [id number], patient was hospitalized for afib with RVR when TTE showed an EF 30%. Most recent TTE 8/9/23 shows recovered EF 70%. Had nonobstructive CAD on [facility] in January 2019. Follows with cardiology. Outpatient GDMT is Toprol 50 mg daily. Of note, she was recently hospitalized and discharged 8/12 with increased diuretic regimen consisting of torsemide 40 mg BID and metolazone 2 mg weekly. She was 239 lbs at discharge and is now currently 240. She is not volume overloaded on presentation at this time. Cardiology was consulted today and provided the following assessment  · Unclear as to exact patient volume status.   Patient does not appear overtly volume overloaded on exam.  Patient's down trending creatinine signifies that she was dehydrated and needed the IV fluids that were given yesterday. However patient's weight has increased 10 pounds in this admission which is most likely due to IV rehydration as well as holding patient's diuretics. Plan:  · Continue holding torsemide and metolazone in the setting of DANIELA today 8/18  · GDMT:   · Continue metoprolol 50 mg  · Not on ARNI, SGLT2i, or MRA given her CKD   · Monitor intake/output, daily weights  · Per cardiology's note patient was given a one-time dose of IV Bumex this morning 8/18  · A afternoon/evening BMP was ordered to evaluate creatinine. · If creatinine continues to decrease after this first dose of IV Bumex, then give another IV dose of Bumex   · The eventual plan is to restart torsemide at 40 mg p.o. in the morning and 20 mg p.o. in the evening      Leukopenia  Assessment & Plan  Patient has a past history of leukopenia with WBCs previously in the 3's  Patient's most recent WBC was 3.30 on 8/18    Plan:  -Considering that this is a chronic issue for the patient nothing to do at this time unless patient becomes symptomatic    Essential hypertension  Assessment & Plan  Home antihypertensives include doxazosin, metolazone, metoprolol succinate, and torsemide. Most recent blood pressure on 8/18 was oh 168/88    Plan:  · Continue metoprolol 50 mg daily  · Hold torsemide and metolazone in the setting of DANIELA  · If patient's creatinine hold after trial of IV Bumex today 8/18, per cardiology's note we will consider restarting torsemide at 40 mg p.o. in the morning and 20 mg p.o. in the evening  · Continue doxazosin 2 mg nightly with holding parameters of systolic below 559 in order to avoid Hypoperfusion of kidneys in the setting of DANIELA    Paroxysmal A-fib [facility])  Assessment & Plan  S/p AV node ablation 5/10/23  Ventricular pacemaker in place. CHADS-VASc is 4.      Plan:  · Continue AC w/ Xarelto 15 mg daily   · Continue metoprolol 50 mg daily     Tobacco abuse  Assessment & Plan  Plan:  · Nicotine 7 mg patch provided    History of monomorphic ventricular tachycardia, s/p ICD in 2016  Assessment & Plan  At cardiology office visit today 8/16, requested if [employer] can reprogram her device while she is admitted. Plan:  - Continue metoprolol 50 mg daily  - Ask Case management about [employer] ICD before discharge per patients request     Gastroesophageal reflux disease   Assessment & Plan  History of GERD    Plan:  Continue pantoprazole 40 mg daily    * Acute kidney injury superimposed on CKD [facility])  Assessment & Plan  Lab Results   Component Value Date    EGFR 18 08/18/2023    EGFR 11 08/17/2023    EGFR 10 08/16/2023    CREATININE 2.77 (H) 08/18/2023    CREATININE 4.02 (H) 08/17/2023    CREATININE 4.46 (H) 08/16/2023     61 y/o female with a history of CKD4 presents after outpatient labs revealed creatinine 4.69 from baseline 2.1-2.3. Of note, she was recently discharged from the hospital on 8/12 with escalated diuretic regimen consisting of torsemide 40 mg twice daily and metolazone 2.5 mg once weekly (initially on torsemide 60 mg daily at admission). BUN/Cr ratio currently is ~15. Denies NSAID use or other new nephrotoxic medications. Patient appears euvolemic to hypovolemic on exam. Suspect etiology of her DANIELA is prerenal due to overdiuresis and dehydration. As of 8/18 patient's creatinine continues to down trended now at 2.77  KUB 8/17 showed No acute urinary tract pathology.   Secondary to this unremarkable KUB, suspicion of renal stone has decreased    Plan:  · No indication for urgent dialysis at this time   · Monitor intake/output, daily weights   · Monitor renal function; daily BMP   · Continue retention protocol  · Continue doxazosin 2 mg nightly with holding parameters of systolic below 292 in order to avoid Hypoperfusion of kidneys in the setting of DANIELA  · Patient was trialed on IV Bumex today in the setting of fluid overload after intentionally rehydrating patient with IV fluids yesterday. If patient's repeat creatinine levels decrease after IV Bumex then another dose of IV Bumex is recommended with eventual restarting of patient's torsemide as a dose decrease to 40 mg in the morning and 20 mg at night. (Was previously on 40 mg torsemide twice daily and 2.5 mg metolazone once daily prior to this admission)             VTE Pharmacologic Prophylaxis: VTE Score: 3 Moderate Risk (Score 3-4) - Pharmacological DVT Prophylaxis Ordered: rivaroxaban (Xarelto). Patient Centered Rounds: I performed bedside rounds with nursing staff today. Discussions with Specialists or Other Care Team Provider: Attending, senior resident, reviewed cardiology note    Education and Discussions with Family / Patient: Updated  () via phone. Current Length of Stay: 2 day(s)  Current Patient Status: Inpatient   Discharge Plan: Anticipate discharge in 24-48 hrs to home. Code Status: Level 1 - Full Code    Subjective:   Patient was seen and examined at bedside today. Patient was calm, cooperative, pleasant with examination today. Patient did not appear to be in acute distress and began conversation by asking "can I go home today". Patient reports that she overall feels better today. She reports that left-sided posterior flank pain is still present but improved compared to yesterday. Patient reports bilateral lower abdominal pain to palpation secondary to history of diverticulosis and headache. However headache appears to be slightly better than yesterday. Patient denies chest pain, shortness of breath, palpitations, lightheadedness and dizziness, nausea or vomiting, constipation open (last bowel movement this morning), hematuria, blood in stool, pain on urination. Patient expressed that she would like to be discharged tomorrow 8/19 because she has to get to a concert that is very important to her.   When updating her  on the phone  also mentioned the importance of this concert tomorrow night  at 9 PM.     Objective:     Vitals:   Temp (24hrs), Av.8 °F (36.6 °C), Min:97.8 °F (36.6 °C), Max:97.8 °F (36.6 °C)    Temp:  [97.8 °F (36.6 °C)] 97.8 °F (36.6 °C)  HR:  [60] 60  Resp:  [18] 18  BP: (149-168)/(72-88) 168/88  SpO2:  [96 %-97 %] 97 %  Body mass index is 39.36 kg/m². Input and Output Summary (last 24 hours):   No intake or output data in the 24 hours ending 23 [id number]    Physical Exam:   Physical Exam  Vitals reviewed. Constitutional:       General: She is in acute distress. Appearance: She is obese. She is ill-appearing. HENT:      Mouth/Throat:      Mouth: Mucous membranes are moist.   Cardiovascular:      Rate and Rhythm: Normal rate and regular rhythm. Pulses: Normal pulses. Pulmonary:      Effort: Pulmonary effort is normal. No respiratory distress. Breath sounds: Normal breath sounds. Abdominal:      General: Bowel sounds are normal.      Comments: Tenderness to palpation across the lower abdomen    Musculoskeletal:         General: No tenderness. Right lower leg: No edema. Left lower leg: No edema. Skin:     Capillary Refill: Capillary refill takes less than 2 seconds. Neurological:      Mental Status: She is alert and oriented to person, place, and time.           Additional Data:     Labs:  Results from last 7 days   Lab Units 23  0500 23  0540   WBC Thousand/uL 3.30* 3.44*   HEMOGLOBIN g/dL 10.0* 10.3*   HEMATOCRIT % 33.7* 33.5*   PLATELETS Thousands/uL 111* 109*   NEUTROS PCT %  --  64   LYMPHS PCT %  --  27   MONOS PCT %  --  7   EOS PCT %  --  2     Results from last 7 days   Lab Units 23  0500 23  0540 23  1815   SODIUM mmol/L 139   < > 138   POTASSIUM mmol/L 3.8   < > 3.2*   CHLORIDE mmol/L 101   < > 93*   CO2 mmol/L 30   < > 32   BUN mg/dL 50*   < > 68*   CREATININE mg/dL 2.77*   < > 4.46*   ANION GAP mmol/L 8   < > 13   CALCIUM mg/dL 8.7 < > 8.9   ALBUMIN g/dL  --   --  4.1   TOTAL BILIRUBIN mg/dL  --   --  0.44   ALK PHOS U/L  --   --  72   ALT U/L  --   --  18   AST U/L  --   --  28   GLUCOSE RANDOM mg/dL 112   < > 131    < > = values in this interval not displayed. Lines/Drains:  Invasive Devices     Peripheral Intravenous Line  Duration           Peripheral IV 08/16/23 Left Antecubital 2 days    Peripheral IV 08/17/23 Right;Ventral (anterior) Forearm 1 day                      Imaging: Reviewed radiology reports from this admission including: KUB    Recent Cultures (last 7 days):         Last 24 Hours Medication List:   Current Facility-Administered Medications   Medication Dose Route Frequency Provider Last Rate   • acetaminophen  975 mg Oral Q8H Gage Ann DO     • Diclofenac Sodium  2 g Topical Q6H PRN Natty Ventura DO     • doxazosin  2 mg Oral HS Lorri Estrada MD     • ferrous gluconate  324 mg Oral Once per day on Mon Wed Fri Natty Ventura DO     • lidocaine  1 patch Topical Daily Lorri Estrada MD     • metoprolol succinate  50 mg Oral Daily Natty Ventura DO     • nicotine  7 mg Transdermal Daily Natty Ventura DO     • pantoprazole  40 mg Oral Early Morning Natty Ventura DO     • rivaroxaban  15 mg Oral QPM Natty Ventura DO          Today, Patient Was Seen By: Gage Ann DO    **Please Note: This note may have been constructed using a voice recognition system. **
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
## 2023-08-19 VITALS
OXYGEN SATURATION: 96 % | RESPIRATION RATE: 18 BRPM | HEART RATE: 70 BPM | TEMPERATURE: 97.9 F | WEIGHT: 246.69 LBS | BODY MASS INDEX: 38.64 KG/M2 | DIASTOLIC BLOOD PRESSURE: 79 MMHG | SYSTOLIC BLOOD PRESSURE: 139 MMHG

## 2023-08-19 LAB
ANION GAP SERPL CALCULATED.3IONS-SCNC: 4 MMOL/L
BUN SERPL-MCNC: 36 MG/DL (ref 5–25)
CALCIUM SERPL-MCNC: 9.1 MG/DL (ref 8.4–10.2)
CHLORIDE SERPL-SCNC: 103 MMOL/L (ref 96–108)
CO2 SERPL-SCNC: 33 MMOL/L (ref 21–32)
CREAT SERPL-MCNC: 2.2 MG/DL (ref 0.6–1.3)
ERYTHROCYTE [DISTWIDTH] IN BLOOD BY AUTOMATED COUNT: 15.5 % (ref 11.6–15.1)
GFR SERPL CREATININE-BSD FRML MDRD: 23 ML/MIN/1.73SQ M
GLUCOSE SERPL-MCNC: 92 MG/DL (ref 65–140)
HCT VFR BLD AUTO: 34.3 % (ref 34.8–46.1)
HGB BLD-MCNC: 10.1 G/DL (ref 11.5–15.4)
MCH RBC QN AUTO: 24.9 PG (ref 26.8–34.3)
MCHC RBC AUTO-ENTMCNC: 29.4 G/DL (ref 31.4–37.4)
MCV RBC AUTO: 85 FL (ref 82–98)
PLATELET # BLD AUTO: 139 THOUSANDS/UL (ref 149–390)
PMV BLD AUTO: 12.1 FL (ref 8.9–12.7)
POTASSIUM SERPL-SCNC: 4.3 MMOL/L (ref 3.5–5.3)
RBC # BLD AUTO: 4.05 MILLION/UL (ref 3.81–5.12)
SODIUM SERPL-SCNC: 140 MMOL/L (ref 135–147)
WBC # BLD AUTO: 4.42 THOUSAND/UL (ref 4.31–10.16)

## 2023-08-19 PROCEDURE — 99232 SBSQ HOSP IP/OBS MODERATE 35: CPT | Performed by: INTERNAL MEDICINE

## 2023-08-19 PROCEDURE — 80048 BASIC METABOLIC PNL TOTAL CA: CPT

## 2023-08-19 PROCEDURE — 99238 HOSP IP/OBS DSCHRG MGMT 30/<: CPT | Performed by: INTERNAL MEDICINE

## 2023-08-19 PROCEDURE — 85027 COMPLETE CBC AUTOMATED: CPT

## 2023-08-19 RX ORDER — BUMETANIDE 0.25 MG/ML
2 INJECTION INTRAMUSCULAR; INTRAVENOUS ONCE
Status: COMPLETED | OUTPATIENT
Start: 2023-08-19 | End: 2023-08-19

## 2023-08-19 RX ORDER — TORSEMIDE 20 MG/1
TABLET ORAL
Qty: 90 TABLET | Refills: 0 | Status: SHIPPED | OUTPATIENT
Start: 2023-08-19 | End: 2023-09-16

## 2023-08-19 RX ORDER — POTASSIUM CHLORIDE 20MEQ/15ML
20 LIQUID (ML) ORAL EVERY MORNING
Qty: 450 ML | Refills: 0 | Status: SHIPPED | OUTPATIENT
Start: 2023-08-19 | End: 2023-09-08

## 2023-08-19 RX ORDER — BUMETANIDE 0.25 MG/ML
2 INJECTION INTRAMUSCULAR; INTRAVENOUS ONCE
Status: DISCONTINUED | OUTPATIENT
Start: 2023-08-19 | End: 2023-08-19

## 2023-08-19 RX ADMIN — PANTOPRAZOLE SODIUM 40 MG: 40 TABLET, DELAYED RELEASE ORAL at 04:46

## 2023-08-19 RX ADMIN — BUMETANIDE 2 MG: 0.25 INJECTION INTRAMUSCULAR; INTRAVENOUS at 10:33

## 2023-08-19 RX ADMIN — METOPROLOL SUCCINATE 50 MG: 50 TABLET, EXTENDED RELEASE ORAL at 10:33

## 2023-08-19 NOTE — ASSESSMENT & PLAN NOTE
Patient has a past history of leukopenia with WBCs previously in the 3's  Patient's most recent WBC was 3.30 on 8/18    Plan:  -Considering that this is a chronic issue for the patient nothing to do at this time unless patient becomes symptomatic

## 2023-08-19 NOTE — ASSESSMENT & PLAN NOTE
Wt Readings from Last 3 Encounters:   08/18/23 114 kg (251 lb 5.2 oz)   08/16/23 109 kg (240 lb)   08/12/23 109 kg (239 lb 3.2 oz)       Previously with reduced EF 2/2 tachycardia mediated cardiomyopathy. In JUY 0847, patient was hospitalized for afib with RVR when TTE showed an EF 30%. Most recent TTE 8/9/23 shows recovered EF 70%. Had nonobstructive CAD on [facility] in January 2019. Follows with cardiology. Outpatient GDMT is Toprol 50 mg daily. Of note, she was recently hospitalized and discharged 8/12 with increased diuretic regimen consisting of torsemide 40 mg BID and metolazone 2 mg weekly. She was 239 lbs at discharge and is now currently 240. She is not volume overloaded on presentation at this time. Cardiology was consulted today and provided the following assessment  · Unclear as to exact patient volume status. Patient does not appear overtly volume overloaded on exam.  Patient's down trending creatinine signifies that she was dehydrated and needed the IV fluids that were given yesterday. However patient's weight has increased 10 pounds in this admission which is most likely due to IV rehydration as well as holding patient's diuretics. Plan:  · Continue holding torsemide and metolazone in the setting of DANIELA today 8/18  · GDMT:   · Continue metoprolol 50 mg  · Not on ARNI, SGLT2i, or MRA given her CKD   · Monitor intake/output, daily weights  · Per cardiology's note patient was given a one-time dose of IV Bumex this morning 8/18  · A afternoon/evening BMP was ordered to evaluate creatinine.   · If creatinine continues to decrease after this first dose of IV Bumex, then give another IV dose of Bumex   · The eventual plan is to restart torsemide at 40 mg p.o. in the morning and 20 mg p.o. in the evening

## 2023-08-19 NOTE — ASSESSMENT & PLAN NOTE
Lab Results   Component Value Date    EGFR 23 08/19/2023    EGFR 18 08/18/2023    EGFR 11 08/17/2023    CREATININE 2.20 (H) 08/19/2023    CREATININE 2.77 (H) 08/18/2023    CREATININE 4.02 (H) 08/17/2023     63 y/o female with a history of CKD4 presents after outpatient labs revealed creatinine 4.69 from baseline 2.1-2.3. Of note, she was recently discharged from the hospital on 8/12 with escalated diuretic regimen consisting of torsemide 40 mg twice daily and metolazone 2.5 mg once weekly (initially on torsemide 60 mg daily at admission). BUN/Cr ratio currently is ~15. Denies NSAID use or other new nephrotoxic medications. Patient appears euvolemic to hypovolemic on exam. Suspect etiology of her DANIELA is prerenal due to overdiuresis and dehydration. As of 8/18 patient's creatinine continues to down trended now at 2.77  KUB 8/17 showed No acute urinary tract pathology. Secondary to this unremarkable KUB, suspicion of renal stone has decreased    Plan:  · No indication for urgent dialysis at this time   · Monitor intake/output, daily weights   · Monitor renal function; daily BMP   · Continue retention protocol  · Continue doxazosin 2 mg nightly with holding parameters of systolic below 168 in order to avoid Hypoperfusion of kidneys in the setting of DANIELA  · Patient was trialed on IV Bumex today in the setting of fluid overload after intentionally rehydrating patient with IV fluids yesterday. If patient's repeat creatinine levels decrease after IV Bumex then another dose of IV Bumex is recommended with eventual restarting of patient's torsemide as a dose decrease to 40 mg in the morning and 20 mg at night.  (Was previously on 40 mg torsemide twice daily and 2.5 mg metolazone once daily prior to this admission)

## 2023-08-19 NOTE — ASSESSMENT & PLAN NOTE
At cardiology office visit today 8/16, requested if [employer] can reprogram her device while she is admitted.      Plan:  - Continue metoprolol 50 mg daily  - Ask Case management about [employer] ICD before discharge per patients request

## 2023-08-19 NOTE — PROGRESS NOTES
Cardiology Progress Note - Cathie Corona 62 y.o. female MRN: [id number]    Unit/Bed#: S -01 Encounter: [id number]      Assessment/Recommendations:  1. DANIELA on CKD stage IV: likely from overdiuresis in setting of recent diuretic increase/adjustment on prior admission. S/p diuretics being held and IVF hydration, now with abdominal bloating/distension suggesting volume overload and responding to IV bumex. Baseline Cr 1.7-2.1.  2.  Chronic diastolic CHF exacerbation: with tenuous volume status, iatrogenic volume overload after initially coming in with overdiuresis. Now responding to diuresis with IV bumex. Will redose again today to get to Cr that is closer to baseline. Recheck BMP in 1 week and call our office to decide on whether she should take metolazone that is usually dosed weekly. Will need to have 40mg torsemide in AM and 20mg in PM for home regimen of diuretic. 3.  H/o recovered NICM: tachycardia-mediated. Normal EF on most recent echo. Continued on B-blocker, advanced CKD prohibits use of ACE/ARB/ARNi or aldosterone antagonist.  Approximate dry weight 234-235 lbs. 4.  H/o monomorphic VT: s/p MDT DC AICD, normal device function on most recent interrogation. Continue B-blocker. 5.  H/o persistent afib/flutter: s/p AVJ ablation and pacer dependent. Continued on B-blocker and Xarelto for [facility]. 6.  HTN: moderately controlled on B-blocker and doxazosin. 7.  HLD: not on statin as outpatient. 8.  Nicotine dependence: encouraged cessation  9. H/o cocaine use  10: h/o marijuana use  11. SURINDER: CPAP HS  12.  Stable from cardiac standpoint for discharge today with above recommendations. Subjective:   Patient seen and examined. No significant events overnight.  ; pertinent negatives - chest pain, chest pressure/discomfort, dyspnea, irregular heart beat, lower extremity edema and palpitations. Continues with mild abdominal pain/distension.     Objective:     Vitals: Blood pressure 150/84, pulse 66, temperature 97.9 °F (36.6 °C), resp.  rate 18, weight 112 kg (246 lb 11.1 oz), SpO2 96 %, not currently breastfeeding., Body mass index is 38.64 kg/m².,   Orthostatic Blood Pressures    Flowsheet Row Most Recent Value   Blood Pressure 150/84 filed at 08/19/2023 0757   Patient Position - Orthostatic VS Lying filed at 08/18/2023 2223          No intake or output data in the 24 hours ending 08/19/23 7703      Physical Exam:    GEN: Chantell Tejada appears well, alert and oriented x 3, pleasant and cooperative   HEENT: pupils equal, round, and reactive to light; extraocular muscles intact  NECK: supple, no carotid bruits   HEART: regular rhythm, normal S1 and S2, no murmurs, clicks, gallops or rubs   LUNGS: clear to auscultation bilaterally; no wheezes, rales, or rhonchi   ABDOMEN: normal bowel sounds, soft, no tenderness, +mild distention  EXTREMITIES: peripheral pulses normal; no clubbing, cyanosis, or edema  NEURO: no focal findings   SKIN: normal without suspicious lesions on exposed skin    Medications:      Current Facility-Administered Medications:   •  acetaminophen (TYLENOL) tablet 975 mg, 975 mg, Oral, Q8H, Shara Lao DO, 975 mg at 08/18/23 2306  •  Diclofenac Sodium (VOLTAREN) 1 % topical gel 2 g, 2 g, Topical, Q6H PRN, Natty Ventura DO, 2 g at 08/17/23 0503  •  doxazosin (CARDURA) tablet 2 mg, 2 mg, Oral, HS, Alejo Saenz MD, 2 mg at 08/18/23 2223  •  ferrous gluconate (FERGON) tablet 324 mg, 324 mg, Oral, Once per day on Mon Wed Fri, Natty Ventura DO, 324 mg at 08/18/23 0848  •  lidocaine (LIDODERM) 5 % patch 1 patch, 1 patch, Topical, Daily, Alejo Saenz MD, 1 patch at 08/17/23 1208  •  metoprolol succinate (TOPROL-XL) 24 hr tablet 50 mg, 50 mg, Oral, Daily, Natty Ventura DO, 50 mg at 08/18/23 0849  •  nicotine (NICODERM CQ) 7 mg/24hr TD 24 hr patch 7 mg, 7 mg, Transdermal, Daily, Natty Ventura DO, 7 mg at 08/17/23 0813  •  pantoprazole (PROTONIX) EC tablet 40 mg, 40 mg, Oral, Early Morning, Natty Ventura DO, 40 mg at 08/19/23 0446  •  rivaroxaban (XARELTO) tablet 15 mg, 15 mg, Oral, QPM, Elviarijenny Ventura DO, 15 mg at 08/18/23 1810     Labs & Results:        Results from last 7 days   Lab Units 08/19/23  0446 08/18/23  0500 08/17/23  0540   WBC Thousand/uL 4.42 3.30* 3.44*   HEMOGLOBIN g/dL 10.1* 10.0* 10.3*   HEMATOCRIT % 34.3* 33.7* 33.5*   PLATELETS Thousands/uL 139* 111* 109*         Results from last 7 days   Lab Units 08/19/23  0446 08/18/23  0500 08/17/23  0540 08/16/23  1815   POTASSIUM mmol/L 4.3 3.8 3.2* 3.2*   CHLORIDE mmol/L 103 101 97 93*   CO2 mmol/L 33* 30 33* 32   BUN mg/dL 36* 50* 66* 68*   CREATININE mg/dL 2.20* 2.77* 4.02* 4.46*   CALCIUM mg/dL 9.1 8.7 8.5 8.9   ALK PHOS U/L  --   --   --  72   ALT U/L  --   --   --  18   AST U/L  --   --   --  28               Echo: personally reviewed - normal LV function    EKG personally reviewed by Jarred Chavez MD.

## 2023-08-19 NOTE — ASSESSMENT & PLAN NOTE
Home antihypertensives include doxazosin, metolazone, metoprolol succinate, and torsemide.    Most recent blood pressure on 8/18 was oh 168/88    Plan:  · Continue metoprolol 50 mg daily  · Hold torsemide and metolazone in the setting of DANIELA  · If patient's creatinine hold after trial of IV Bumex today 8/18, per cardiology's note we will consider restarting torsemide at 40 mg p.o. in the morning and 20 mg p.o. in the evening  · Continue doxazosin 2 mg nightly with holding parameters of systolic below 113 in order to avoid Hypoperfusion of kidneys in the setting of DANIELA

## 2023-08-19 NOTE — ASSESSMENT & PLAN NOTE
Lab Results   Component Value Date    EGFR 18 08/18/2023    EGFR 11 08/17/2023    EGFR 10 08/16/2023    CREATININE 2.77 (H) 08/18/2023    CREATININE 4.02 (H) 08/17/2023    CREATININE 4.46 (H) 08/16/2023     61 y/o female with a history of CKD4 presents after outpatient labs revealed creatinine 4.69 from baseline 2.1-2.3. Of note, she was recently discharged from the hospital on 8/12 with escalated diuretic regimen consisting of torsemide 40 mg twice daily and metolazone 2.5 mg once weekly (initially on torsemide 60 mg daily at admission). BUN/Cr ratio currently is ~15. Denies NSAID use or other new nephrotoxic medications. Patient appears euvolemic to hypovolemic on exam. Suspect etiology of her DANIELA is prerenal due to overdiuresis and dehydration. As of 8/18 patient's creatinine continues to down trended now at 2.77  KUB 8/17 showed No acute urinary tract pathology. Secondary to this unremarkable KUB, suspicion of renal stone has decreased    Plan:  · No indication for urgent dialysis at this time   · Monitor intake/output, daily weights   · Monitor renal function; daily BMP   · Continue retention protocol  · Continue doxazosin 2 mg nightly with holding parameters of systolic below 271 in order to avoid Hypoperfusion of kidneys in the setting of DANIELA  · Patient was trialed on IV Bumex today in the setting of fluid overload after intentionally rehydrating patient with IV fluids yesterday. If patient's repeat creatinine levels decrease after IV Bumex then another dose of IV Bumex is recommended with eventual restarting of patient's torsemide as a dose decrease to 40 mg in the morning and 20 mg at night.  (Was previously on 40 mg torsemide twice daily and 2.5 mg metolazone once daily prior to this admission)

## 2023-08-19 NOTE — ASSESSMENT & PLAN NOTE
Wt Readings from Last 3 Encounters:   08/19/23 112 kg (246 lb 11.1 oz)   08/16/23 109 kg (240 lb)   08/12/23 109 kg (239 lb 3.2 oz)       Previously with reduced EF 2/2 tachycardia mediated cardiomyopathy. In Z 9337, patient was hospitalized for afib with RVR when TTE showed an EF 30%. Most recent TTE 8/9/23 shows recovered EF 70%. Had nonobstructive CAD on [facility] in January 2019. Follows with cardiology. Outpatient GDMT is Toprol 50 mg daily. Of note, she was recently hospitalized and discharged 8/12 with increased diuretic regimen consisting of torsemide 40 mg BID and metolazone 2 mg weekly. She was 239 lbs at discharge and is now currently 240. She is not volume overloaded on presentation at this time. Cardiology was consulted today and provided the following assessment  · Unclear as to exact patient volume status. Patient does not appear overtly volume overloaded on exam.  Patient's down trending creatinine signifies that she was dehydrated and needed the IV fluids that were given yesterday. However patient's weight has increased 10 pounds in this admission which is most likely due to IV rehydration as well as holding patient's diuretics.     Plan:  · Continue holding torsemide and metolazone in the setting of DANIELA today 8/18  · GDMT:   · Continue metoprolol 50 mg  · Not on ARNI, SGLT2i, or MRA given her CKD   · Monitor intake/output, daily weights  · Per cardiology's note patient was given a one-time dose of IV Bumex morning of 8/18  · Plan to start torsemide at 40 mg p.o. in the morning and 20 mg p.o. in the evening  · Monitor BMP in one week  · Will defer decision to restart metolazone to cardiology as outpatient

## 2023-08-19 NOTE — DISCHARGE SUMMARY
[address]  Discharge- Norvel Deter 1965, 62 y.o. female MRN: [id number]  Unit/Bed#: S -01 Encounter: [id number]  Primary Care Provider: Madison Heaton MD   Date and time admitted to hospital: 8/16/2023  5:06 PM    Chronic heart failure with preserved ejection fraction (HFpEF) [facility])  Assessment & Plan  Wt Readings from Last 3 Encounters:   08/19/23 112 kg (246 lb 11.1 oz)   08/16/23 109 kg (240 lb)   08/12/23 109 kg (239 lb 3.2 oz)       Previously with reduced EF 2/2 tachycardia mediated cardiomyopathy. In HCV 2514, patient was hospitalized for afib with RVR when TTE showed an EF 30%. Most recent TTE 8/9/23 shows recovered EF 70%. Had nonobstructive CAD on [facility] in January 2019. Follows with cardiology. Outpatient GDMT is Toprol 50 mg daily. Of note, she was recently hospitalized and discharged 8/12 with increased diuretic regimen consisting of torsemide 40 mg BID and metolazone 2 mg weekly. She was 239 lbs at discharge and is now currently 240. She is not volume overloaded on presentation at this time. Cardiology was consulted today and provided the following assessment  · Unclear as to exact patient volume status. Patient does not appear overtly volume overloaded on exam.  Patient's down trending creatinine signifies that she was dehydrated and needed the IV fluids that were given yesterday. However patient's weight has increased 10 pounds in this admission which is most likely due to IV rehydration as well as holding patient's diuretics.     Plan:  · Continue holding torsemide and metolazone in the setting of DANIELA today 8/18  · GDMT:   · Continue metoprolol 50 mg  · Not on ARNI, SGLT2i, or MRA given her CKD   · Monitor intake/output, daily weights  · Per cardiology's note patient was given a one-time dose of IV Bumex morning of 8/18  · Plan to start torsemide at 40 mg p.o. in the morning and 20 mg p.o. in the evening  · Monitor BMP in one week  · Will defer decision to restart metolazone to cardiology as outpatient        * Acute kidney injury superimposed on CKD [facility])  Assessment & Plan  Lab Results   Component Value Date    EGFR 23 08/19/2023    EGFR 18 08/18/2023    EGFR 11 08/17/2023    CREATININE 2.20 (H) 08/19/2023    CREATININE 2.77 (H) 08/18/2023    CREATININE 4.02 (H) 08/17/2023     61 y/o female with a history of CKD4 presents after outpatient labs revealed creatinine 4.69 from baseline 2.1-2.3. Of note, she was recently discharged from the hospital on 8/12 with escalated diuretic regimen consisting of torsemide 40 mg twice daily and metolazone 2.5 mg once weekly (initially on torsemide 60 mg daily at admission). BUN/Cr ratio currently is ~15. Denies NSAID use or other new nephrotoxic medications. Patient appears euvolemic to hypovolemic on exam. Suspect etiology of her DANIELA is prerenal due to overdiuresis and dehydration. As of 8/18 patient's creatinine continues to down trended now at 2.77  KUB 8/17 showed No acute urinary tract pathology. Secondary to this unremarkable KUB, suspicion of renal stone has decreased    Plan:  · No indication for urgent dialysis at this time   · Monitor intake/output, daily weights   · Monitor renal function; daily BMP   · Continue retention protocol  · Continue doxazosin 2 mg nightly with holding parameters of systolic below 179 in order to avoid Hypoperfusion of kidneys in the setting of DANIELA  · Patient was trialed on IV Bumex today in the setting of fluid overload after intentionally rehydrating patient with IV fluids yesterday. If patient's repeat creatinine levels decrease after IV Bumex then another dose of IV Bumex is recommended with eventual restarting of patient's torsemide as a dose decrease to 40 mg in the morning and 20 mg at night. (Was previously on 40 mg torsemide twice daily and 2.5 mg metolazone once daily prior to this admission)    Hypokalemia  Assessment & Plan  Potassium on admission 3.2.  Patient is on standing potassium replacement at home. Potassium on the morning of 8/18 was back within normal limits at 3.8  Plan:  · Continue to Trend potassium with daily BMP and replace potassium as needed    Anemia  Assessment & Plan  History of normocytic anemia presenting with hemoglobin 11.0 from baseline 10-11. Recent iron studies from 5/29/23 showed iron 22, ferritin 64, iron saturation 5, TIBC 420, folate 15.9. No signs of acute blood loss at this time. Hemoglobin on 8/18 was 10.0    Plan:  · Continue PO iron supplementation with ferrous gluconate 325 mg once per day on Monday Wednesday Friday  · Monitor for signs of blood loss; transfuse for hemoglobin <7    Leukopenia  Assessment & Plan  Patient has a past history of leukopenia with WBCs previously in the 3's  Patient's most recent WBC was 3.30 on 8/18    Plan:  -Considering that this is a chronic issue for the patient nothing to do at this time unless patient becomes symptomatic    Essential hypertension  Assessment & Plan  Home antihypertensives include doxazosin, metolazone, metoprolol succinate, and torsemide. Most recent blood pressure on 8/18 was oh 168/88    Plan:  · Continue metoprolol 50 mg daily  · Hold torsemide and metolazone in the setting of DANIELA  · If patient's creatinine hold after trial of IV Bumex today 8/18, per cardiology's note we will consider restarting torsemide at 40 mg p.o. in the morning and 20 mg p.o. in the evening  · Continue doxazosin 2 mg nightly with holding parameters of systolic below 033 in order to avoid Hypoperfusion of kidneys in the setting of DANIELA    Paroxysmal A-fib [facility])  Assessment & Plan  S/p AV node ablation 5/10/23  Ventricular pacemaker in place. CHADS-VASc is 4.      Plan:  · Continue AC w/ Xarelto 15 mg daily   · Continue metoprolol 50 mg daily     Tobacco abuse  Assessment & Plan  Plan:  · Nicotine 7 mg patch provided    History of monomorphic ventricular tachycardia, s/p ICD in 2016  Assessment & Plan  At cardiology office visit today 8/16, requested if [employer] can reprogram her device while she is admitted. Plan:  - Continue metoprolol 50 mg daily  - Ask Case management about [employer] ICD before discharge per patients request     Gastroesophageal reflux disease   Assessment & Plan  History of GERD    Plan:  Continue pantoprazole 40 mg daily    Medical Problems     Resolved Problems  Date Reviewed: 8/19/2023   None       Discharging Resident: Shad Lee DO  Discharging Attending: Pierre Rivera MD  PCP: Maykel Orozco MD  Admission Date:   Admission Orders (From admission, onward)     Ordered        08/16/23 Michele Flores  Once                      Discharge Date: 08/19/23    Consultations During Hospital Stay:  · Cardiology     Procedures Performed:   · None     Significant Findings / Test Results:   · Creatinine on day of admission was 4.69  · Creatinine on day of discharge was 2.20  · KUB was unremarkable, no acute urinary tract pathology     Incidental Findings:   · none    Test Results Pending at Discharge (will require follow up):  · none     Outpatient Tests Requested:  · BMP within one week of discharge to assess creatinine levels after starting new regimen of diuretics     Complications:  none    Reason for Admission: DANIELA superimposed on CKD     Hospital Course:   Wilfredo Wheeler is a 62 y.o. female patient who originally presented to the hospital on 8/16/2023 due to elevated creatinine levels at 4.69 with a baseline of 2.1-2.3 (in the setting of CKD stage 4), lightheaded/dizziness, dehydration. Patient also complained of left-sided flank pain that radiated to anterior left abdomen. On admission patient was found to be slightly hypokalemic with a potassium of 3.2 patient was given 2 doses of potassium chloride 40 mEq p.o. in order to replete potassium. This repletion was successful and patient's potassium returned to within normal limits. Urinalysis was unremarkable.   KUB was unremarkable showing no acute urinary tract pathology. Secondary to a unremarkable KUB with no signs of renal calculi, it was proposed that patient's left flank pain was more of a musculoskeletal nature. As needed Voltaren gel was used for pain relief. The patient's DANIELA was thought to be prerenal secondary to dehydration from recent increase in diuretic regimen. Patient's home regimen prior to this admission was metolazone 2.5 mg once weekly and torsemide 40 mg twice daily. During this hospital course patient's torsemide and metolazone were both held and normal saline and Isolyte IV fluids in an effort to return patient to euvolemia. With IV hydration and holding of home diuretics patient's creatinine gradually improved and down trended towards baseline. In addition patient's lightheadedness and dizziness resolved with this IV hydration. The patient's legs remained free of pitting edema while admitted however cardiology noticed a 10 pound weight gain since admission. Secondary to this weight gain and unclear volume status on physical exam, patient was given a one-time dose of IV Bumex which was well-tolerated as signified by patient's continued down trending creatinine. At the time of discharge patient's creatinine was back within baseline at 2.22. Patient was sent home on torsemide 40 mg p.o. every morning and 20 mg p.o. every evening for daily total of 60 mg/day. Weekly metolazone was not restarted at the time of discharge. Please see above list of diagnoses and related plan for additional information. Condition at Discharge: stable    Discharge Day Visit / Exam:   Subjective: Patient seen and examined at the bedside this morning. No acute events overnight. She denies any significant shortness of breath and chest pain. She continues to make good urine output and overall feels well. She is eager for discharge today.   Vitals: Blood Pressure: 139/79 (08/19/23 1033)  Pulse: 70 (08/19/23 1033)  Temperature: 97.9 °F (36.6 °C) (08/19/23 0757)  Temp Source: Oral (08/18/23 1602)  Respirations: 18 (08/18/23 2223)  Weight - Scale: 112 kg (246 lb 11.1 oz) (08/19/23 0531)  SpO2: 96 % (08/19/23 0757)  Exam:   Physical Exam  Vitals reviewed. Constitutional:       General: She is in acute distress. Appearance: She is obese. She is ill-appearing. HENT:      Mouth/Throat:      Mouth: Mucous membranes are moist.   Cardiovascular:      Rate and Rhythm: Normal rate and regular rhythm. Pulses: Normal pulses. Pulmonary:      Effort: Pulmonary effort is normal. No respiratory distress. Breath sounds: Normal breath sounds. Abdominal:      General: Bowel sounds are normal.      Comments: Tenderness to palpation across the lower abdomen    Musculoskeletal:         General: No tenderness. Right lower leg: No edema. Left lower leg: No edema. Skin:     Capillary Refill: Capillary refill takes less than 2 seconds. Neurological:      Mental Status: She is alert and oriented to person, place, and time. Discussion with Family: Updated  () via phone. Discharge instructions/Information to patient and family:   See after visit summary for information provided to patient and family. Provisions for Follow-Up Care:  See after visit summary for information related to follow-up care and any pertinent home health orders. Disposition:   Home    Planned Readmission: no    Discharge Medications:  See after visit summary for reconciled discharge medications provided to patient and/or family.       **Please Note: This note may have been constructed using a voice recognition system**

## 2023-08-19 NOTE — DISCHARGE INSTR - AVS FIRST PAGE
Dear Joo Clay,     It was our pleasure to care for you here at [facility]. It is our hope that we were always able to exceed the expected standards for your care during your stay. You were hospitalized due to acute kidney injury. You were cared for on the [address] [facility] third floor by Airam Blanco DO under the service of Asad Redd MD with the [facility] who covers for your primary care physician (PCP), Lokesh East MD, while you were hospitalized. If you have any questions or concerns related to this hospitalization, you may contact us at [phone]. For follow up as well as any medication refills, we recommend that you follow up with your primary care physician. A registered nurse will reach out to you by phone within a few days after your discharge to answer any additional questions that you may have after going home. However, at this time we provide for you here, the most important instructions / recommendations at discharge:     Notable Medication Adjustments -   Metolazone 2.5 mg once weekly was held  Initiated torsemide at new dose of 40 mg every morning and 20 mg every afternoon for a total of 60 mg/day  Testing Required after Discharge -   Get follow-up BMP within 1 week follow-up  within 1 week of discharge for BMP to assess creatinine on new dose of diuretics  Important follow up information -   Follow-up with PCP within 1 week of discharge  Follow-up with outpatient cardiologist within 1 week of discharge  Other Instructions   Please review this entire after visit summary as additional general instructions including medication list, appointments, activity, diet, any pertinent wound care, and other additional recommendations from your care team that may be provided for you.       Sincerely,     Airam Blanco DO

## 2023-08-19 NOTE — ASSESSMENT & PLAN NOTE
Home antihypertensives include doxazosin, metolazone, metoprolol succinate, and torsemide.    Most recent blood pressure on 8/18 was oh 168/88    Plan:  · Continue metoprolol 50 mg daily  · Hold torsemide and metolazone in the setting of DANIELA  · If patient's creatinine hold after trial of IV Bumex today 8/18, per cardiology's note we will consider restarting torsemide at 40 mg p.o. in the morning and 20 mg p.o. in the evening  · Continue doxazosin 2 mg nightly with holding parameters of systolic below 911 in order to avoid Hypoperfusion of kidneys in the setting of DANIELA

## 2023-08-19 NOTE — ASSESSMENT & PLAN NOTE
History of normocytic anemia presenting with hemoglobin 11.0 from baseline 10-11. Recent iron studies from 5/29/23 showed iron 22, ferritin 64, iron saturation 5, TIBC 420, folate 15.9. No signs of acute blood loss at this time.    Hemoglobin on 8/18 was 10.0    Plan:  · Continue PO iron supplementation with ferrous gluconate 325 mg once per day on Monday Wednesday Friday  · Monitor for signs of blood loss; transfuse for hemoglobin <7

## 2023-08-19 NOTE — ASSESSMENT & PLAN NOTE
Potassium on admission 3.2. Patient is on standing potassium replacement at home.    Potassium on the morning of 8/18 was back within normal limits at 3.8  Plan:  · Continue to Trend potassium with daily BMP and replace potassium as needed

## 2023-08-21 ENCOUNTER — HOSPITAL ENCOUNTER (EMERGENCY)
Facility: HOSPITAL | Age: 58
Discharge: HOME | End: 2023-08-21
Attending: EMERGENCY MEDICINE | Admitting: EMERGENCY MEDICINE
Payer: COMMERCIAL

## 2023-08-21 ENCOUNTER — APPOINTMENT (EMERGENCY)
Dept: RADIOLOGY | Facility: HOSPITAL | Age: 58
End: 2023-08-21
Payer: COMMERCIAL

## 2023-08-21 ENCOUNTER — PATIENT OUTREACH (OUTPATIENT)
Dept: CASE MANAGEMENT | Facility: HOSPITAL | Age: 58
End: 2023-08-21

## 2023-08-21 VITALS
TEMPERATURE: 98.5 F | HEART RATE: 64 BPM | OXYGEN SATURATION: 95 % | BODY MASS INDEX: 37.15 KG/M2 | RESPIRATION RATE: 18 BRPM | DIASTOLIC BLOOD PRESSURE: 89 MMHG | WEIGHT: 237.22 LBS | SYSTOLIC BLOOD PRESSURE: 131 MMHG

## 2023-08-21 DIAGNOSIS — M25.569 KNEE PAIN: ICD-10-CM

## 2023-08-21 DIAGNOSIS — I50.33 ACUTE ON CHRONIC DIASTOLIC CHF (CONGESTIVE HEART FAILURE) (HCC): Primary | Chronic | ICD-10-CM
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
## 2023-08-21 DIAGNOSIS — M79.671 RIGHT FOOT PAIN: ICD-10-CM

## 2023-08-21 PROCEDURE — 73564 X-RAY EXAM KNEE 4 OR MORE: CPT

## 2023-08-21 PROCEDURE — 99284 EMERGENCY DEPT VISIT MOD MDM: CPT

## 2023-08-21 PROCEDURE — 99284 EMERGENCY DEPT VISIT MOD MDM: CPT | Performed by: EMERGENCY MEDICINE

## 2023-08-21 PROCEDURE — 73630 X-RAY EXAM OF FOOT: CPT

## 2023-08-21 PROCEDURE — 73610 X-RAY EXAM OF ANKLE: CPT

## 2023-08-21 RX ORDER — TORSEMIDE 20 MG/1
TABLET ORAL
Qty: 90 TABLET | OUTPATIENT
Start: 2023-08-21

## 2023-08-21 RX ORDER — BACITRACIN, NEOMYCIN, POLYMYXIN B 400; 3.5; 5 [USP'U]/G; MG/G; [USP'U]/G
1 OINTMENT TOPICAL ONCE
Status: COMPLETED | OUTPATIENT
Start: 2023-08-21 | End: 2023-08-21

## 2023-08-21 RX ORDER — ACETAMINOPHEN 325 MG/1
975 TABLET ORAL ONCE
Status: COMPLETED | OUTPATIENT
Start: 2023-08-21 | End: 2023-08-21

## 2023-08-21 RX ADMIN — BACITRACIN ZINC, NEOMYCIN, POLYMYXIN B SULFAT 1 SMALL APPLICATION: 5000; 3.5; 4 OINTMENT TOPICAL at 23:22

## 2023-08-21 RX ADMIN — ACETAMINOPHEN 975 MG: 325 TABLET ORAL at 23:15

## 2023-08-21 RX ADMIN — DICLOFENAC SODIUM 2 G: 10 GEL TOPICAL at 23:22

## 2023-08-21 NOTE — PROGRESS NOTES
Heart Failure Outpatient Care Coordinator Note: Patient is a 30 day readmit. Chart notes reviewed and follow up hospital discharge call made to patient. She report is feeling OK, has all her medication and aware of changes. She confirms she is taking torsemide 20 mg tablets 2 in am and 1 in pm for total 60 mg/day. She states having trouble keeping the potasium down- throwing it up- both liquid and the K-dur. Eating and drinking OK otherwise. She denies SOB or edema. She has not weighed herself yet at home. She states she has been having problems with abdominal cramping and diarrhea- with 4-5 loose stools daily. "Not new". Wants to see GI and recommended she ask PCP for referral  when she sees him this Friday. No cardiology appointment till end of September- will see if can get one this week. AVS recommends BMP, none ordered. Call made to cardiology office and appointment made with HF APPati for this Wednesday, 8/23. Will add BMP and route note to her. HF education reviewed including importance of daily am weights after voiding and recording- she states she will start in am. Also discussed low sodium diet- states she is aware and does avoid processed and take out foods. Does cook meals at home. Reviewed FR 50-64 oz- ? May need more with loose BM's. States struggles with dry mouth and tips given-ice chips, hard candy. Patient also states she struggles being able to work and has started plan to reapply for disability. Does have MA and able to afford medication. Has support-  and drives. Patient receptive and and agreeable to weekly outreach.  Will follow up tomorrow for baseline weight and update patient regarding cardiology appointment and labwork

## 2023-08-22 ENCOUNTER — PATIENT OUTREACH (OUTPATIENT)
Dept: CASE MANAGEMENT | Facility: HOSPITAL | Age: 58
End: 2023-08-22
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
## 2023-08-22 NOTE — PROGRESS NOTES
Heart Failure Outpatient Care Coordinator Note: Chart notes reviewed. Went to [address] [place] [address] ED after fall and discharged home. Call made and left message. Called patient again and was able to reach her. SHe reports her am home weight was 236#- which is likley about her dry weight. Feels ok. We discussed her fall and ED visit. She denies any syncope or dizziness. States has problems with her right knee, needs a replacement and it just gave out when she turned. No significant injuries. Reminded her to get her lab work today if possible and also her cardiology appointment tomorrow at 2:20 pm in [facility]. Will follow up.

## 2023-08-23 ENCOUNTER — PATIENT OUTREACH (OUTPATIENT)
Dept: CASE MANAGEMENT | Facility: HOSPITAL | Age: 58
End: 2023-08-23
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
## 2023-08-23 NOTE — UTILIZATION REVIEW
NOTIFICATION OF ADMISSION DISCHARGE   This is a Notification of Discharge from [address] [facility]. Please be advised that this patient has been discharge from our facility. Below you will find the admission and discharge date and time including the patient’s disposition. UTILIZATION REVIEW CONTACT:  Nando Razo  Utilization   Network Utilization Review Department  Phone: [phone] x carefully listen to the prompts. All voicemails are confidential.  Email: [email or web address]     ADMISSION INFORMATION  PRESENTATION DATE: 8/16/2023  5:06 PM  OBERVATION ADMISSION DATE:   INPATIENT ADMISSION DATE: 8/16/23  7:37 PM   DISCHARGE DATE: 8/19/2023 11:45 AM   DISPOSITION:Home/Self Care    IMPORTANT INFORMATION:  Send all requests for admission clinical reviews, approved or denied determinations and any other requests to dedicated fax number below belonging to the campus where the patient is receiving treatment.  List of dedicated fax numbers:  [place] DENIALS (Administrative/Medical Necessity) [phone] [address] [facility] (Maternity/NICU/Pediatrics) [phone]   [facility] [phone]   [facility] [phone] [address] [phone]   [address] [place] [address] [phone]   [facility] [phone]   [address] [place] [address] [address] [facility] [phone]   [address] [place] [address] [phone] [address] [facility] [phone] [address] [facility] [address] [phone]

## 2023-08-24 ENCOUNTER — PATIENT OUTREACH (OUTPATIENT)
Dept: CASE MANAGEMENT | Facility: HOSPITAL | Age: 58
End: 2023-08-24

## 2023-08-24 ENCOUNTER — HOSPITAL ENCOUNTER (EMERGENCY)
Facility: HOSPITAL | Age: 58
Discharge: HOME | End: 2023-08-24
Attending: EMERGENCY MEDICINE
Payer: COMMERCIAL

## 2023-08-24 ENCOUNTER — APPOINTMENT (EMERGENCY)
Dept: RADIOLOGY | Facility: HOSPITAL | Age: 58
End: 2023-08-24
Payer: COMMERCIAL

## 2023-08-24 VITALS
HEART RATE: 71 BPM | BODY MASS INDEX: 38.31 KG/M2 | SYSTOLIC BLOOD PRESSURE: 147 MMHG | RESPIRATION RATE: 20 BRPM | DIASTOLIC BLOOD PRESSURE: 91 MMHG | OXYGEN SATURATION: 97 % | TEMPERATURE: 97.5 F | WEIGHT: 244.6 LBS

## 2023-08-24 DIAGNOSIS — R07.81 RIB PAIN ON RIGHT SIDE: Primary | ICD-10-CM

## 2023-08-24 PROCEDURE — 71101 X-RAY EXAM UNILAT RIBS/CHEST: CPT

## 2023-08-24 PROCEDURE — 99284 EMERGENCY DEPT VISIT MOD MDM: CPT

## 2023-08-24 RX ORDER — LIDOCAINE 50 MG/G
1 PATCH TOPICAL ONCE
Status: DISCONTINUED | OUTPATIENT
Start: 2023-08-24 | End: 2023-08-24 | Stop reason: HOSPADM

## 2023-08-24 RX ORDER — LIDOCAINE 50 MG/G
OINTMENT TOPICAL AS NEEDED
Qty: 30 G | Refills: 0 | Status: SHIPPED | OUTPATIENT
Start: 2023-08-24

## 2023-08-24 RX ORDER — TRAMADOL HYDROCHLORIDE 50 MG/1
50 TABLET ORAL EVERY 6 HOURS PRN
Qty: 16 TABLET | Refills: 0 | Status: SHIPPED | OUTPATIENT
Start: 2023-08-24 | End: 2023-09-03

## 2023-08-24 RX ADMIN — LIDOCAINE 1 PATCH: 700 PATCH TOPICAL at 20:21

## 2023-08-24 NOTE — PROGRESS NOTES
Heart Failure Outpatient Care Coordinator Note: Call made to patient to reschedule her cardiology appointment. She states she would prefer [facility] as is much more convenient for her (lives in [facility]). She is agreeable to see Dr Jaison Hoskins next week. Can cancel with Dr. Lexii Haskins. Patient requesting reminder call.
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
## 2023-08-31 ENCOUNTER — OFFICE VISIT (OUTPATIENT)
Dept: CARDIOLOGY CLINIC | Facility: CLINIC | Age: 58
End: 2023-08-31
Payer: COMMERCIAL

## 2023-08-31 VITALS
SYSTOLIC BLOOD PRESSURE: 132 MMHG | HEART RATE: 68 BPM | WEIGHT: 240.6 LBS | BODY MASS INDEX: 37.68 KG/M2 | DIASTOLIC BLOOD PRESSURE: 76 MMHG | OXYGEN SATURATION: 98 %

## 2023-08-31 DIAGNOSIS — Z72.0 TOBACCO ABUSE: ICD-10-CM

## 2023-08-31 DIAGNOSIS — Z86.79 HISTORY OF VENTRICULAR TACHYCARDIA: ICD-10-CM

## 2023-08-31 DIAGNOSIS — I50.9 ACUTE EXACERBATION OF CHF (CONGESTIVE HEART FAILURE) (HCC): ICD-10-CM

## 2023-08-31 DIAGNOSIS — N17.9 ACUTE KIDNEY INJURY (HCC): Primary | ICD-10-CM

## 2023-08-31 DIAGNOSIS — I48.4 ATYPICAL ATRIAL FLUTTER (HCC): ICD-10-CM

## 2023-08-31 PROCEDURE — 99204 OFFICE O/P NEW MOD 45 MIN: CPT | Performed by: STUDENT IN AN ORGANIZED HEALTH CARE EDUCATION/TRAINING PROGRAM

## 2023-09-07 ENCOUNTER — PATIENT OUTREACH (OUTPATIENT)
Dept: CASE MANAGEMENT | Facility: HOSPITAL | Age: 58
End: 2023-09-07

## 2023-09-07 NOTE — PROGRESS NOTES
Heart Failure Outpatient Care Coordinator Note: Chart notes reviewed and call made to patient, but unable to reach or leave message with recording "caller not able to take calls at this time".

## 2023-09-08 ENCOUNTER — APPOINTMENT (EMERGENCY)
Dept: RADIOLOGY | Facility: HOSPITAL | Age: 58
End: 2023-09-08
Payer: COMMERCIAL

## 2023-09-08 ENCOUNTER — HOSPITAL ENCOUNTER (OUTPATIENT)
Facility: HOSPITAL | Age: 58
Setting detail: OBSERVATION
Discharge: HOME | End: 2023-09-09
Attending: EMERGENCY MEDICINE | Admitting: HOSPITALIST
Payer: COMMERCIAL

## 2023-09-08 DIAGNOSIS — I50.9 CHF (CONGESTIVE HEART FAILURE) (HCC): ICD-10-CM

## 2023-09-08 DIAGNOSIS — I50.33 ACUTE ON CHRONIC DIASTOLIC CHF (CONGESTIVE HEART FAILURE) (HCC): Chronic | ICD-10-CM

## 2023-09-08 DIAGNOSIS — R07.9 CHEST PAIN: Primary | ICD-10-CM

## 2023-09-08 LAB
2HR DELTA HS TROPONIN: -19 NG/L
ALBUMIN SERPL BCP-MCNC: 4.2 G/DL (ref 3.5–5)
ALP SERPL-CCNC: 77 U/L (ref 34–104)
ALT SERPL W P-5'-P-CCNC: 17 U/L (ref 7–52)
ANION GAP SERPL CALCULATED.3IONS-SCNC: 11 MMOL/L
AST SERPL W P-5'-P-CCNC: 33 U/L (ref 13–39)
ATRIAL RATE: 267 BPM
ATRIAL RATE: 83 BPM
BASOPHILS # BLD AUTO: 0.02 THOUSANDS/ÂΜL (ref 0–0.1)
BASOPHILS NFR BLD AUTO: 0 % (ref 0–1)
BILIRUB SERPL-MCNC: 0.56 MG/DL (ref 0.2–1)
BNP SERPL-MCNC: 931 PG/ML (ref 0–100)
BUN SERPL-MCNC: 47 MG/DL (ref 5–25)
CALCIUM SERPL-MCNC: 9.4 MG/DL (ref 8.4–10.2)
CARDIAC TROPONIN I PNL SERPL HS: 145 NG/L
CARDIAC TROPONIN I PNL SERPL HS: 164 NG/L
CHLORIDE SERPL-SCNC: 94 MMOL/L (ref 96–108)
CO2 SERPL-SCNC: 31 MMOL/L (ref 21–32)
CREAT SERPL-MCNC: 3.15 MG/DL (ref 0.6–1.3)
EOSINOPHIL # BLD AUTO: 0.17 THOUSAND/ÂΜL (ref 0–0.61)
EOSINOPHIL NFR BLD AUTO: 2 % (ref 0–6)
ERYTHROCYTE [DISTWIDTH] IN BLOOD BY AUTOMATED COUNT: 15.8 % (ref 11.6–15.1)
FLUAV RNA RESP QL NAA+PROBE: NEGATIVE
FLUBV RNA RESP QL NAA+PROBE: NEGATIVE
GFR SERPL CREATININE-BSD FRML MDRD: 15 ML/MIN/1.73SQ M
GLUCOSE SERPL-MCNC: 129 MG/DL (ref 65–140)
HCT VFR BLD AUTO: 39.2 % (ref 34.8–46.1)
HGB BLD-MCNC: 12.5 G/DL (ref 11.5–15.4)
IMM GRANULOCYTES # BLD AUTO: 0.02 THOUSAND/UL (ref 0–0.2)
IMM GRANULOCYTES NFR BLD AUTO: 0 % (ref 0–2)
LYMPHOCYTES # BLD AUTO: 1.11 THOUSANDS/ÂΜL (ref 0.6–4.47)
LYMPHOCYTES NFR BLD AUTO: 16 % (ref 14–44)
MCH RBC QN AUTO: 26.2 PG (ref 26.8–34.3)
MCHC RBC AUTO-ENTMCNC: 31.9 G/DL (ref 31.4–37.4)
MCV RBC AUTO: 82 FL (ref 82–98)
MONOCYTES # BLD AUTO: 0.41 THOUSAND/ÂΜL (ref 0.17–1.22)
MONOCYTES NFR BLD AUTO: 6 % (ref 4–12)
NEUTROPHILS # BLD AUTO: 5.37 THOUSANDS/ÂΜL (ref 1.85–7.62)
NEUTS SEG NFR BLD AUTO: 76 % (ref 43–75)
NRBC BLD AUTO-RTO: 0 /100 WBCS
P AXIS: 102 DEGREES
PLATELET # BLD AUTO: 164 THOUSANDS/UL (ref 149–390)
PMV BLD AUTO: 12.6 FL (ref 8.9–12.7)
POTASSIUM SERPL-SCNC: 2.8 MMOL/L (ref 3.5–5.3)
PR INTERVAL: 178 MS
PROT SERPL-MCNC: 8 G/DL (ref 6.4–8.4)
QRS AXIS: -75 DEGREES
QRS AXIS: -79 DEGREES
QRSD INTERVAL: 202 MS
QRSD INTERVAL: 202 MS
QT INTERVAL: 540 MS
QT INTERVAL: 558 MS
QTC INTERVAL: 580 MS
QTC INTERVAL: 634 MS
RBC # BLD AUTO: 4.78 MILLION/UL (ref 3.81–5.12)
RSV RNA RESP QL NAA+PROBE: NEGATIVE
SARS-COV-2 RNA RESP QL NAA+PROBE: NEGATIVE
SODIUM SERPL-SCNC: 136 MMOL/L (ref 135–147)
T WAVE AXIS: 91 DEGREES
T WAVE AXIS: 95 DEGREES
VENTRICULAR RATE: 65 BPM
VENTRICULAR RATE: 83 BPM
WBC # BLD AUTO: 7.1 THOUSAND/UL (ref 4.31–10.16)

## 2023-09-08 PROCEDURE — 80053 COMPREHEN METABOLIC PANEL: CPT

## 2023-09-08 PROCEDURE — 99223 1ST HOSP IP/OBS HIGH 75: CPT | Performed by: INTERNAL MEDICINE

## 2023-09-08 PROCEDURE — 93005 ELECTROCARDIOGRAM TRACING: CPT

## 2023-09-08 PROCEDURE — 0241U HB NFCT DS VIR RESP RNA 4 TRGT: CPT

## 2023-09-08 PROCEDURE — 36415 COLL VENOUS BLD VENIPUNCTURE: CPT

## 2023-09-08 PROCEDURE — 93010 ELECTROCARDIOGRAM REPORT: CPT | Performed by: INTERNAL MEDICINE

## 2023-09-08 PROCEDURE — 99285 EMERGENCY DEPT VISIT HI MDM: CPT | Performed by: EMERGENCY MEDICINE

## 2023-09-08 PROCEDURE — 84484 ASSAY OF TROPONIN QUANT: CPT

## 2023-09-08 PROCEDURE — 99285 EMERGENCY DEPT VISIT HI MDM: CPT

## 2023-09-08 PROCEDURE — 71046 X-RAY EXAM CHEST 2 VIEWS: CPT

## 2023-09-08 PROCEDURE — 83880 ASSAY OF NATRIURETIC PEPTIDE: CPT

## 2023-09-08 PROCEDURE — 85025 COMPLETE CBC W/AUTO DIFF WBC: CPT

## 2023-09-08 RX ORDER — LIDOCAINE 50 MG/G
1 PATCH TOPICAL DAILY
Status: DISCONTINUED | OUTPATIENT
Start: 2023-09-09 | End: 2023-09-09 | Stop reason: HOSPADM

## 2023-09-08 RX ORDER — POTASSIUM CHLORIDE 20 MEQ/1
40 TABLET, EXTENDED RELEASE ORAL ONCE
Status: COMPLETED | OUTPATIENT
Start: 2023-09-08 | End: 2023-09-08

## 2023-09-08 RX ORDER — ACETAMINOPHEN 325 MG/1
650 TABLET ORAL ONCE
Status: COMPLETED | OUTPATIENT
Start: 2023-09-08 | End: 2023-09-08

## 2023-09-08 RX ORDER — ASPIRIN 325 MG
325 TABLET ORAL ONCE
Status: COMPLETED | OUTPATIENT
Start: 2023-09-08 | End: 2023-09-08

## 2023-09-08 RX ORDER — PANTOPRAZOLE SODIUM 40 MG/1
40 TABLET, DELAYED RELEASE ORAL DAILY
Status: DISCONTINUED | OUTPATIENT
Start: 2023-09-09 | End: 2023-09-09 | Stop reason: HOSPADM

## 2023-09-08 RX ORDER — BUMETANIDE 0.25 MG/ML
3 INJECTION INTRAMUSCULAR; INTRAVENOUS 2 TIMES DAILY
Status: DISCONTINUED | OUTPATIENT
Start: 2023-09-08 | End: 2023-09-09

## 2023-09-08 RX ORDER — DOXAZOSIN MESYLATE 1 MG/1
2 TABLET ORAL
Status: DISCONTINUED | OUTPATIENT
Start: 2023-09-08 | End: 2023-09-09 | Stop reason: HOSPADM

## 2023-09-08 RX ORDER — OXYCODONE HYDROCHLORIDE 5 MG/1
5 TABLET ORAL EVERY 8 HOURS PRN
Status: DISCONTINUED | OUTPATIENT
Start: 2023-09-08 | End: 2023-09-09 | Stop reason: HOSPADM

## 2023-09-08 RX ORDER — TRAMADOL HYDROCHLORIDE 50 MG/1
50 TABLET ORAL 2 TIMES DAILY PRN
Status: DISCONTINUED | OUTPATIENT
Start: 2023-09-08 | End: 2023-09-09 | Stop reason: HOSPADM

## 2023-09-08 RX ORDER — POTASSIUM CHLORIDE 14.9 MG/ML
20 INJECTION INTRAVENOUS
Status: DISCONTINUED | OUTPATIENT
Start: 2023-09-08 | End: 2023-09-08

## 2023-09-08 RX ORDER — ACETAMINOPHEN 325 MG/1
650 TABLET ORAL EVERY 6 HOURS PRN
Status: DISCONTINUED | OUTPATIENT
Start: 2023-09-08 | End: 2023-09-09 | Stop reason: HOSPADM

## 2023-09-08 RX ORDER — POTASSIUM CHLORIDE 20 MEQ/1
20 TABLET, EXTENDED RELEASE ORAL DAILY
Status: DISCONTINUED | OUTPATIENT
Start: 2023-09-09 | End: 2023-09-09 | Stop reason: HOSPADM

## 2023-09-08 RX ORDER — BENZONATATE 100 MG/1
200 CAPSULE ORAL 3 TIMES DAILY PRN
Status: DISCONTINUED | OUTPATIENT
Start: 2023-09-08 | End: 2023-09-09 | Stop reason: HOSPADM

## 2023-09-08 RX ORDER — METOPROLOL SUCCINATE 50 MG/1
50 TABLET, EXTENDED RELEASE ORAL DAILY
Status: DISCONTINUED | OUTPATIENT
Start: 2023-09-09 | End: 2023-09-09 | Stop reason: HOSPADM

## 2023-09-08 RX ORDER — NICOTINE 21 MG/24HR
1 PATCH, TRANSDERMAL 24 HOURS TRANSDERMAL DAILY
Status: DISCONTINUED | OUTPATIENT
Start: 2023-09-09 | End: 2023-09-09 | Stop reason: HOSPADM

## 2023-09-08 RX ADMIN — POTASSIUM CHLORIDE 40 MEQ: 1500 TABLET, EXTENDED RELEASE ORAL at 17:58

## 2023-09-08 RX ADMIN — ACETAMINOPHEN 650 MG: 325 TABLET, FILM COATED ORAL at 16:22

## 2023-09-08 RX ADMIN — OXYCODONE HYDROCHLORIDE 5 MG: 5 TABLET ORAL at 17:59

## 2023-09-08 RX ADMIN — RIVAROXABAN 15 MG: 15 TABLET, FILM COATED ORAL at 17:58

## 2023-09-08 RX ADMIN — ACETAMINOPHEN 650 MG: 325 TABLET, FILM COATED ORAL at 13:27

## 2023-09-08 RX ADMIN — TRAMADOL HYDROCHLORIDE 50 MG: 50 TABLET, COATED ORAL at 21:24

## 2023-09-08 RX ADMIN — ASPIRIN 325 MG ORAL TABLET 325 MG: 325 PILL ORAL at 14:18

## 2023-09-08 RX ADMIN — BENZONATATE 200 MG: 100 CAPSULE ORAL at 17:59

## 2023-09-08 RX ADMIN — POTASSIUM CHLORIDE 40 MEQ: 1500 TABLET, EXTENDED RELEASE ORAL at 15:00

## 2023-09-08 NOTE — ASSESSMENT & PLAN NOTE
· Noted at 2.8   · Status post 40 mEq PO in the ED  · Continue home potassium   · 40 mEq IV x 1  · Monitor BMP  · Telemetry

## 2023-09-08 NOTE — ASSESSMENT & PLAN NOTE
· BP acceptable   · Continue Cardura with hold parameters   · Continue Toprol  · Monitor with diuresis
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
## 2023-09-08 NOTE — ASSESSMENT & PLAN NOTE
Lab Results   Component Value Date    EGFR 15 09/08/2023    EGFR 23 08/19/2023    EGFR 18 08/18/2023    CREATININE 3.15 (H) 09/08/2023    CREATININE 2.20 (H) 08/19/2023    CREATININE 2.77 (H) 08/18/2023   · Unclear baseline as she has been in the low 2s and up to the high 4s   · Will monitor BMP closely with diuretics   · I/O   · Avoid hypotension

## 2023-09-08 NOTE — ED ATTENDING ATTESTATION
9/8/2023  IDia MD, saw and evaluated the patient. I have discussed the patient with the resident/non-physician practitioner and agree with the resident's/non-physician practitioner's findings, Plan of Care, and MDM as documented in the resident's/non-physician practitioner's note, except where noted. All available labs and Radiology studies were reviewed. I was present for key portions of any procedure(s) performed by the resident/non-physician practitioner and I was immediately available to provide assistance. At this point I agree with the current assessment done in the Emergency Department. I have conducted an independent evaluation of this patient a history and physical is as follows:    51-year-old female with history of CHF and atrial fibrillation, on Xarelto as well as diuretics. Presents for evaluation of chest pain described as a pressure sensation as well as sore throat and cough productive of yellow sputum that started a few days ago. Reports increased swelling in her legs above her baseline. Pressure sensation is worse with certain movements such as bending over. Denies pleuritic nature of her pain. Pain occasionally radiates around her left side, but denies radiation of the pain straight through to her back. Patient has a [employer] ICD in place that was placed in 2016. Physical Exam  Vitals and nursing note reviewed. Constitutional:       General: She is not in acute distress. Appearance: She is well-developed. She is not diaphoretic. HENT:      Head: Normocephalic and atraumatic. Right Ear: External ear normal.      Left Ear: External ear normal.      Nose: Nose normal.   Eyes:      Conjunctiva/sclera: Conjunctivae normal.   Cardiovascular:      Rate and Rhythm: Normal rate and regular rhythm. Pulses: Normal pulses. Heart sounds: Normal heart sounds. No murmur heard. No friction rub. No gallop.       Comments: Bilateral upper extremity blood pressures are equal  Pulmonary:      Effort: Pulmonary effort is normal. No respiratory distress. Breath sounds: Normal breath sounds. No wheezing or rales. Abdominal:      General: Bowel sounds are normal. There is no distension. Palpations: Abdomen is soft. Tenderness: There is no abdominal tenderness. There is no guarding. Musculoskeletal:         General: No deformity. Normal range of motion. Cervical back: Normal range of motion and neck supple. Comments: Trace edema to the bilateral ankles. No calf swelling or tenderness   Skin:     General: Skin is warm and dry. Neurological:      Mental Status: She is alert and oriented to person, place, and time. Motor: No abnormal muscle tone. Psychiatric:         Mood and Affect: Mood normal.           Wt Readings from Last 3 Encounters:   09/08/23 111 kg (243 lb 13.3 oz)   08/31/23 109 kg (240 lb 9.6 oz)   08/24/23 111 kg (244 lb 9.6 oz)         ED Course  ED Course as of 09/08/23 1723   Fri Sep 08, 2023   1404 I personally interpreted the patient's EKG which reveals an atrially and ventricularly paced rhythm at 83 bpm, left axis deviation unchanged from prior, wide QRS complex with evidence of left ventricular hypertrophy, T wave inversions in leads I and aVL unchanged from prior, no pathologic ST segment elevation. Patient does report ongoing mild sensation of pressure to the left side of her chest.  Troponin elevated to 164. Her chest pain is reproduced with certain movements making it somewhat atypical in nature but given troponin elevation with cardiac history she was given 324 mg of aspirin and will continue to trend and admit for further cardiology work-up. Heparin not started as patient is on Xarelto with which she reports compliance. Low suspicion for PE in the setting of compliance with anticoagulation, lack of calf swelling or tenderness, and lack of shortness of breath. 1429 BNP is 931 from 539 a month ago.     [facility] Hypokalemic to 2.8. Will replace initiate oral replacement. Repeat troponin is 145. Suspect troponin elevation in the setting of CHF exacerbation rather than acute MI. Low suspicion for aortic dissection given description of the patient's pain. Chest x-ray shows normal mediastinum, no evidence of pulmonary edema though the patient does have a several pound weight gain as well as increased lower extremity edema compared to her baseline. Will admit for CHF exacerbation.         Critical Care Time  Procedures

## 2023-09-08 NOTE — ASSESSMENT & PLAN NOTE
· Patient with nonspecific chest pain that is reproducible to palpation. Delta troponin with -19.  EKG stable from prior   · Follow up 4 hour troponin   · Telemetry   · Lidoderm to chest wall  · Diuretic as above  · Consider cardiology consult tomorrow
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
## 2023-09-08 NOTE — ASSESSMENT & PLAN NOTE
Occupational Therapy      Patient Name:  Noreen Mac   MRN:  [id number]    OT met with pt at bedside this AM. Pt reports she has been completing all functional mobility and self-care task with no significant decline in function. Pt reports she ambulates the hallways daily with a family members and reports no OT needs/concerns at this time. Pt instructed to contact medical team or inform RN if therapy needs arise prior to next scheduled OT visit. Pt will continue to be followed by OT to ensure no decline in functional status while receiving treatment. No billable units this date. Will follow up next scheduled OT session.     Lupe Wild OT  6/27/2018   Wt Readings from Last 3 Encounters:   09/08/23 111 kg (243 lb 13.3 oz)   08/31/23 109 kg (240 lb 9.6 oz)   08/24/23 111 kg (244 lb 9.6 oz)     · Suspect present on admission given patient is reporting SOB and 6 lb weight gain over the last few days. She did have some dietary indiscretion (ate a hoagie yesterday) and has been drinking 2-3 L of water per day.    · Trace edema on exam  · Not hypoxic   · Compliant with Torsemide 40 mg QAM, 20 mg QPM  · Admit patient to med/surg under observation status   · Bumex 3 mg IV BID  · Cardiac diet  · Daily weights, I/O  · If feeling better tomorrow can discharge home  · Monitor BMP

## 2023-09-09 VITALS
WEIGHT: 238.1 LBS | HEART RATE: 59 BPM | TEMPERATURE: 97.8 F | DIASTOLIC BLOOD PRESSURE: 68 MMHG | OXYGEN SATURATION: 95 % | SYSTOLIC BLOOD PRESSURE: 107 MMHG | BODY MASS INDEX: 37.29 KG/M2 | RESPIRATION RATE: 18 BRPM

## 2023-09-09 LAB
ANION GAP SERPL CALCULATED.3IONS-SCNC: 8 MMOL/L
BUN SERPL-MCNC: 49 MG/DL (ref 5–25)
CALCIUM SERPL-MCNC: 9 MG/DL (ref 8.4–10.2)
CHLORIDE SERPL-SCNC: 97 MMOL/L (ref 96–108)
CO2 SERPL-SCNC: 32 MMOL/L (ref 21–32)
CREAT SERPL-MCNC: 2.96 MG/DL (ref 0.6–1.3)
GFR SERPL CREATININE-BSD FRML MDRD: 16 ML/MIN/1.73SQ M
GLUCOSE P FAST SERPL-MCNC: 95 MG/DL (ref 65–99)
GLUCOSE SERPL-MCNC: 95 MG/DL (ref 65–140)
MAGNESIUM SERPL-MCNC: 2.3 MG/DL (ref 1.9–2.7)
POTASSIUM SERPL-SCNC: 3.4 MMOL/L (ref 3.5–5.3)
SODIUM SERPL-SCNC: 137 MMOL/L (ref 135–147)

## 2023-09-09 PROCEDURE — 99239 HOSP IP/OBS DSCHRG MGMT >30: CPT | Performed by: PHYSICIAN ASSISTANT

## 2023-09-09 PROCEDURE — 83735 ASSAY OF MAGNESIUM: CPT | Performed by: PHYSICIAN ASSISTANT

## 2023-09-09 PROCEDURE — 80048 BASIC METABOLIC PNL TOTAL CA: CPT | Performed by: PHYSICIAN ASSISTANT

## 2023-09-09 RX ADMIN — PANTOPRAZOLE SODIUM 40 MG: 40 TABLET, DELAYED RELEASE ORAL at 08:46

## 2023-09-09 RX ADMIN — NICOTINE 1 PATCH: 14 PATCH, EXTENDED RELEASE TRANSDERMAL at 08:46

## 2023-09-09 RX ADMIN — TRAMADOL HYDROCHLORIDE 50 MG: 50 TABLET, COATED ORAL at 10:22

## 2023-09-09 RX ADMIN — POTASSIUM CHLORIDE 20 MEQ: 1500 TABLET, EXTENDED RELEASE ORAL at 08:46

## 2023-09-09 RX ADMIN — OXYCODONE HYDROCHLORIDE 5 MG: 5 TABLET ORAL at 05:38

## 2023-09-09 NOTE — ASSESSMENT & PLAN NOTE
· Patient with nonspecific chest pain that is reproducible to palpation. Delta troponin with -19.  EKG stable from prior   · Nonspecific chest pain, resolved

## 2023-09-09 NOTE — DISCHARGE INSTR - AVS FIRST PAGE
Dear Sharon Elizondo,     It was our pleasure to care for you here at [facility]. It is our hope that we were always able to exceed the expected standards for your care during your stay. You were hospitalized due to shortness of breath. You were cared for on the 3rd floor by Raphael Willard PA-C under the service of Katerine Parr MD with the [facility] who covers for your primary care physician (PCP), Isamar Briseno MD, while you were hospitalized. If you have any questions or concerns related to this hospitalization, you may contact us at [phone]. For follow up as well as any medication refills, we recommend that you follow up with your primary care physician. A registered nurse will reach out to you by phone within a few days after your discharge to answer any additional questions that you may have after going home. However, at this time we provide for you here, the most important instructions / recommendations at discharge:     Notable Medication Adjustments -   Continue your home medications, but hold your Torsemide today   Testing Required after Discharge -   None  ** Please contact your PCP to request testing orders for any of the testing recommended here **  Important follow up information -   Follow up with your family doctor and cardiology  Other Instructions -   None  Please review this entire after visit summary as additional general instructions including medication list, appointments, activity, diet, any pertinent wound care, and other additional recommendations from your care team that may be provided for you.       Sincerely,     Marco Andersen PA-C

## 2023-09-09 NOTE — PLAN OF CARE
Problem: CARDIOVASCULAR - ADULT  Goal: Maintains optimal cardiac output and hemodynamic stability  Description: INTERVENTIONS:  - Monitor I/O, vital signs and rhythm  - Monitor for S/S and trends of decreased cardiac output  - Administer and titrate ordered vasoactive medications to optimize hemodynamic stability  - Assess quality of pulses, skin color and temperature  - Assess for signs of decreased coronary artery perfusion  - Instruct patient to report change in severity of symptoms  9/9/2023 1445 by Lee Hayward RN  Outcome: Adequate for Discharge  9/9/2023 1201 by Lee Hayward RN  Outcome: Progressing  Goal: Absence of cardiac dysrhythmias or at baseline rhythm  Description: INTERVENTIONS:  - Continuous cardiac monitoring, vital signs, obtain 12 lead EKG if ordered  - Administer antiarrhythmic and heart rate control medications as ordered  - Monitor electrolytes and administer replacement therapy as ordered  9/9/2023 1445 by Lee Hayward RN  Outcome: Adequate for Discharge  9/9/2023 1201 by Lee Hayward RN  Outcome: Progressing

## 2023-09-09 NOTE — ASSESSMENT & PLAN NOTE
Lab Results   Component Value Date    EGFR 16 09/09/2023    EGFR 15 09/08/2023    EGFR 23 08/19/2023    CREATININE 2.96 (H) 09/09/2023    CREATININE 3.15 (H) 09/08/2023    CREATININE 2.20 (H) 08/19/2023   · Unclear baseline as she has been in the low 2s and up to the high 4s   · Avoid hypotension

## 2023-09-09 NOTE — UTILIZATION REVIEW
Initial Clinical Review    Admission: Date/Time/Statement:   Admission Orders (From admission, onward)     Ordered        09/08/23 1521  Place in Observation  Once                      Orders Placed This Encounter   Procedures   • Place in Observation     Standing Status:   Standing     Number of Occurrences:   1     Order Specific Question:   Level of Care     Answer:   Med Surg [16]     ED Arrival Information     Expected   -    Arrival   9/8/2023 12:09    Acuity   Emergent            Means of arrival   Wheelchair    Escorted by   Family Member    Service   Hospitalist    Admission type   Emergency            Arrival complaint   Chest Pain, SOB           Chief Complaint   Patient presents with   • Chest Pain     With SOB/Cough/dizziness. Initial Presentation: 62 y.o. female with a PMH of dCHF, V-Tach status post ICD, CKD3, A. Fib, and HTN  who presents with chest pain and coughing with increased SOB. States that her chest pain started yesterday morning. She reports that she sat up in bed and felt a tearing sensation in her chest. She reports that movement and coughing made this worse. Reports that pressing on the area made the pain worse as well. She reports some clear mucus production with cough. States that she gets SOB normally, but this is increased presently. States that she gained about 6 lbs over the last few days. Reports compliance with her diuretics and making good urine. She states that she did eat half of a half of a hoagie yesterday and has been drinking 2-3 liters of water daily. She reports slight leg swelling. States that she had some dizziness. Plan: Observation for acute on chronic CHF, elevated troponin, hypokalemia, stage 3 CKD, Afib, HTN: IV Bumex 3 mg bid, cardiac diet, BMP, trend troponin, telemetry, continue home potassium, potassium repleted in ED, continue Toprol, Xarelto and Cardura.      ED Triage Vitals   Temperature Pulse Respirations Blood Pressure SpO2   09/08/23 1214 09/08/23 1214 09/08/23 1214 09/08/23 1216 09/08/23 1214   98.1 °F (36.7 °C) 66 (!) 26 144/69 97 %      Temp Source Heart Rate Source Patient Position - Orthostatic VS BP Location FiO2 (%)   09/08/23 1214 09/08/23 1214 09/08/23 1214 09/08/23 1214 --   Oral Monitor Sitting Right arm       Pain Score       09/08/23 1622       8          Wt Readings from Last 1 Encounters:   09/09/23 108 kg (238 lb 1.6 oz)     Additional Vital Signs:     Date/Time Temp Pulse Resp BP MAP (mmHg) SpO2 O2 Device   09/09/23 07:55:46 97.8 °F (36.6 °C) 59 -- 80/61 Abnormal  67 95 % --   09/08/23 21:27:58 97.4 °F (36.3 °C) Abnormal  66 -- 123/77 92 97 % --   09/08/23 21:25:14 -- 64 -- 123/77 92 97 % --   09/08/23 2125 -- -- -- 123/77 -- -- --   09/08/23 17:20:20 98.2 °F (36.8 °C) 60 18 96/71 79 95 % --   09/08/23 1615 -- 70 20 95/62  -- 96 % None (Room air)   09/08/23 1600 -- 62 20 88/54 Abnormal    -- 95 % --   09/08/23 1445 -- 76 18 116/64 85 95 % --   09/08/23 1440 -- -- -- 113/65 -- -- --   09/08/23 1330 -- 68 16 95/58 70 95 % --   09/08/23 1216 -- -- -- 144/69 -- -- --     Pertinent Labs/Diagnostic Test Results:   XR chest 2 views   Final Result by Jason Buckley MD (09/08 1328)      No radiographic evidence of acute intrathoracic process.                   Workstation performed: [id number]           9/8 EKG:  Electronic ventricular pacemaker  Abnormal ECG  When compared with ECG of 08-AUG-2023 23:49,  No significant change was found    Results from last 7 days   Lab Units 09/08/23  1325   SARS-COV-2  Negative     Results from last 7 days   Lab Units 09/08/23  1230   WBC Thousand/uL 7.10   HEMOGLOBIN g/dL 12.5   HEMATOCRIT % 39.2   PLATELETS Thousands/uL 164   NEUTROS ABS Thousands/µL 5.37         Results from last 7 days   Lab Units 09/09/23  0551 09/08/23  1230   SODIUM mmol/L 137 136   POTASSIUM mmol/L 3.4* 2.8*   CHLORIDE mmol/L 97 94*   CO2 mmol/L 32 31   ANION GAP mmol/L 8 11   BUN mg/dL 49* 47*   CREATININE mg/dL 2.96* 3.15* EGFR ml/min/1.73sq m 16 15   CALCIUM mg/dL 9.0 9.4   MAGNESIUM mg/dL 2.3  --      Results from last 7 days   Lab Units 09/08/23  1230   AST U/L 33   ALT U/L 17   ALK PHOS U/L 77   TOTAL PROTEIN g/dL 8.0   ALBUMIN g/dL 4.2   TOTAL BILIRUBIN mg/dL 0.56         Results from last 7 days   Lab Units 09/09/23  0551 09/08/23  1230   GLUCOSE RANDOM mg/dL 95 129           Results from last 7 days   Lab Units 09/08/23  1422 09/08/23  1230   HS TNI 0HR ng/L  --  164*   HS TNI 2HR ng/L 145*  --    HSTNI D2 ng/L -19  --            Results from last 7 days   Lab Units 09/08/23  1230   BNP pg/mL 931*           Results from last 7 days   Lab Units 09/08/23  1325   INFLUENZA A PCR  Negative   INFLUENZA B PCR  Negative   RSV PCR  Negative         ED Treatment:   Medication Administration from 09/08/2023 1209 to 09/08/2023 1649       Date/Time Order Dose Route Action     09/08/2023 1327 EDT acetaminophen (TYLENOL) tablet 650 mg 650 mg Oral Given     09/08/2023 1418 EDT aspirin tablet 325 mg 325 mg Oral Given     09/08/2023 1500 EDT potassium chloride (K-DUR,KLOR-CON) CR tablet 40 mEq 40 mEq Oral Given     09/08/2023 1622 EDT acetaminophen (TYLENOL) tablet 650 mg 650 mg Oral Given        Past Medical History:   Diagnosis Date   • ACS (acute coronary syndrome) ([address]) 02/07/2021   • Acute on chronic diastolic CHF 80/77/2601   • Anemia 1/14/2023   • Arthritis    • Atrial fibrillation (HCC)    • Atrial fibrillation with rapid ventricular response (HCC) 03/20/2016   • Breast lump    • CKD (chronic kidney disease) stage 3, GFR 30-59 ml/min (HCC)    • Disease of thyroid gland    • Elevated troponin 09/09/2019   • Epigastric abdominal tenderness 08/15/2021   • 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 ([address]) 05/25/2020   • GERD (gastroesophageal reflux disease)    • H/O transfusion 1987   • Hepatitis C     resolved   • History of tachycardia induced cardiomyopathy 05/2021    in setting of rapid atrial flutter in 05/2021 and again 06/2022   • History of ventricular tachycardia 07/2016    s/p ICD   • Hyperlipidemia    • Hypertension    • Hypokalemia 7/23/2023   • Inappropriate ICD discharge for atrial flutter 04/2023   • NSTEMI (non-ST elevated myocardial infarction) ([address]) 12/26/2018   • Sleep apnea     no cpap     Present on Admission:  • Paroxysmal A-fib ([facility])  • Chronic kidney disease stage 3   • Elevated troponin  • Acute on chronic diastolic CHF (congestive heart failure) ([facility])  • Essential hypertension  • Hypokalemia      Admitting Diagnosis: CHF (congestive heart failure) ([facility]) [I50.9]  Chest pain [R07.9]  Acute on chronic diastolic CHF (congestive heart failure) ([facility]) [I50.33]  Age/Sex: 62 y.o. female  Admission Orders:  Scheduled Medications:  bumetanide, 3 mg, Intravenous, BID  doxazosin, 2 mg, Oral, HS  lidocaine, 1 patch, Topical, Daily  metoprolol succinate, 50 mg, Oral, Daily  nicotine, 1 patch, Transdermal, Daily  pantoprazole, 40 mg, Oral, Daily  potassium chloride, 20 mEq, Oral, Daily  rivaroxaban, 15 mg, Oral, QPM      Continuous IV Infusions:     PRN Meds:  acetaminophen, 650 mg, Oral, Q6H PRN  benzonatate, 200 mg, Oral, TID PRN  oxyCODONE, 5 mg, Oral, Q8H PRN  traMADol, 50 mg, Oral, BID PRN        IP CONSULT TO NUTRITION SERVICES    Network Utilization Review Department  ATTENTION: Please call with any questions or concerns to [phone] and carefully listen to the prompts so that you are directed to the right person. All voicemails are confidential.  Lola Tariq all requests for admission clinical reviews, approved or denied determinations and any other requests to dedicated fax number below belonging to the campus where the patient is receiving treatment.  List of dedicated fax numbers for the Facilities:  [place] DENIALS (Administrative/Medical Necessity) [phone] [facility] [facility] [address] [place] [address] [address] [facility] [address] [facility] [phone] [address] [facility] [address] [place] [id number] [facility] [phone]   [address] [place] [address] [phone]

## 2023-09-09 NOTE — ASSESSMENT & PLAN NOTE
Wt Readings from Last 3 Encounters:   09/09/23 108 kg (238 lb 1.6 oz)   08/31/23 109 kg (240 lb 9.6 oz)   08/24/23 111 kg (244 lb 9.6 oz)     · Thought to be mild overloaded on admission, but did not receive Bumex due to low BP. Weight actually at baseline.    · Creatinine improved, symptoms improved today with essentially no intervention   · Stable for discharge   · Hold diuretics today  · Restart home regimen tomorrow   · Continue rest of cardiac medications

## 2023-09-09 NOTE — DISCHARGE SUMMARY
[facility]  Discharge- [facility] 1965, 62 y.o. female MRN: [id number]  Unit/Bed#: S -01 Encounter: [id number]  Primary Care Provider: Yosi Pompa MD   Date and time admitted to hospital: 9/8/2023 12:11 PM    * Chronic diastolic CHF (congestive heart failure) ([address])  Assessment & Plan  Wt Readings from Last 3 Encounters:   09/09/23 108 kg (238 lb 1.6 oz)   08/31/23 109 kg (240 lb 9.6 oz)   08/24/23 111 kg (244 lb 9.6 oz)     · Thought to be mild overloaded on admission, but did not receive Bumex due to low BP. Weight actually at baseline. · Creatinine improved, symptoms improved today with essentially no intervention   · Stable for discharge   · Hold diuretics today  · Restart home regimen tomorrow   · Continue rest of cardiac medications           Elevated troponin  Assessment & Plan  · Patient with nonspecific chest pain that is reproducible to palpation. Delta troponin with -19.  EKG stable from prior   · Nonspecific chest pain, resolved       Hypokalemia  Assessment & Plan  · Noted at 2.8, improved to 3.4   · Continue home potassium       Chronic kidney disease stage 3   Assessment & Plan  Lab Results   Component Value Date    EGFR 16 09/09/2023    EGFR 15 09/08/2023    EGFR 23 08/19/2023    CREATININE 2.96 (H) 09/09/2023    CREATININE 3.15 (H) 09/08/2023    CREATININE 2.20 (H) 08/19/2023   · Unclear baseline as she has been in the low 2s and up to the high 4s   · Avoid hypotension    History of monomorphic ventricular tachycardia, s/p ICD in 2016  Assessment & Plan  · Paced rhythm on EKG, no signs of firing    Paroxysmal A-fib (HCC)  Assessment & Plan  · Rate controlled   · Continue Toprol  · Continue Xarelto    Essential hypertension  Assessment & Plan  · BP acceptable   · Continue Cardura with hold parameters   · Continue Toprol        Medical Problems     Resolved Problems  Date Reviewed: 9/9/2023   None       Discharging Physician / Practitioner: Bolivar Kendrick Carol Champagne  PCP: Betsy Sargent MD  Admission Date:   Admission Orders (From admission, onward)     Ordered        09/08/23 1521  Place in Observation  Once                      Discharge Date: 09/09/23    Consultations During Hospital Stay:  · None    Procedures Performed:   · CXR    Significant Findings / Test Results:   · CXR: No radiographic evidence of acute intrathoracic process    Incidental Findings:   · None     Test Results Pending at Discharge (will require follow up): · None     Outpatient Tests Requested:  · None    Complications:  None    Reason for Admission: SOB, Chest Pain    Hospital Course:   Nayely Blackburn is a 62 y.o. female patient who originally presented to the hospital on 9/8/2023 due to chest pain. CXR in the ER was negative. EKG was stable. Initial troponin was elevated, but delta was -19. Due to significant cardiac history she was admitted for further monitoring. IV diuresis was planned due to suspected volume overloaded, but she received no IV Bumex due to hypotension intermittently. Her weight was actually at her baseline so diuresis was held. Her symptoms improved the next day. She was instructed to hold her Demedex for today and restart tomorrow. She was discharged in stable condition. Please see above list of diagnoses and related plan for additional information. Condition at Discharge: stable    Discharge Day Visit / Exam:   Subjective:  Patient reports feeling better after eating today. Denies chest pain, dizziness, shortness of breath. Wants to go home. Vitals: Blood Pressure: 107/68 (09/09/23 1020)  Pulse: 59 (09/09/23 0755)  Temperature: 97.8 °F (36.6 °C) (09/09/23 0755)  Temp Source: Oral (09/08/23 1214)  Respirations: 18 (09/08/23 1720)  Weight - Scale: 108 kg (238 lb 1.6 oz) (09/09/23 0553)  SpO2: 95 % (09/09/23 0755)  Exam:   Physical Exam  Constitutional:       General: She is not in acute distress. Appearance: Normal appearance. She is overweight. She is not ill-appearing or diaphoretic. HENT:      Head: Normocephalic and atraumatic. Mouth/Throat:      Mouth: Mucous membranes are moist.   Eyes:      General: No scleral icterus. Pupils: Pupils are equal, round, and reactive to light. Cardiovascular:      Rate and Rhythm: Normal rate and regular rhythm. Pulses: Normal pulses. Heart sounds: Normal heart sounds, S1 normal and S2 normal. No murmur heard. No systolic murmur is present. No diastolic murmur is present. No gallop. No S3 or S4 sounds. Pulmonary:      Effort: Pulmonary effort is normal. No accessory muscle usage or respiratory distress. Breath sounds: Normal breath sounds. No stridor. No decreased breath sounds, wheezing, rhonchi or rales. Chest:      Chest wall: No tenderness. Abdominal:      General: Bowel sounds are normal. There is no distension. Palpations: Abdomen is soft. Tenderness: There is no abdominal tenderness. There is no guarding. Musculoskeletal:      Right lower leg: No edema. Left lower leg: No edema. Skin:     General: Skin is warm and dry. Coloration: Skin is not jaundiced. Neurological:      General: No focal deficit present. Mental Status: She is alert. Mental status is at baseline. Motor: No tremor or seizure activity. Psychiatric:         Behavior: Behavior is cooperative. Discussion with Family: Updated  (significant other) at bedside. Discharge instructions/Information to patient and family:   See after visit summary for information provided to patient and family. Provisions for Follow-Up Care:  See after visit summary for information related to follow-up care and any pertinent home health orders. Disposition:   Home    Planned Readmission: None     Discharge Statement:  I spent 45 minutes discharging the patient. This time was spent on the day of discharge.  I had direct contact with the patient on the day of discharge. Greater than 50% of the total time was spent examining patient, answering all patient questions, arranging and discussing plan of care with patient as well as directly providing post-discharge instructions. Additional time then spent on discharge activities. Discharge Medications:  See after visit summary for reconciled discharge medications provided to patient and/or family.       **Please Note: This note may have been constructed using a voice recognition system**

## 2023-09-11 ENCOUNTER — PATIENT OUTREACH (OUTPATIENT)
Dept: CASE MANAGEMENT | Facility: HOSPITAL | Age: 58
End: 2023-09-11

## 2023-09-11 NOTE — PROGRESS NOTES
Heart Failure Outpatient Care Coordinator Note: Chart notes reviewed- patient was OBS admit last week with c/o CP. At first thought was volume oveload, but was at her base line weight. BP was on low side. Call made to patient she denies CP, SOB or edema- occasional passing dizziness. She states weights are "ok" 236# today- dry weight 235-240. Was 238 at hospital discharge. Taking medication, following diet "as best as I can". Working part-time 15 hrs/week at [employer] Has been trying to get disability and denies need for assistance- has someone helping her. Has follow up appointment with HF NP in [facility]. Would like to establish with cardiologist at [facility] which is closer. Saw Dr. Dusty Kearns who referred her back to general cardiology. She will discuss with NP this week.

## 2023-09-12 ENCOUNTER — TELEPHONE (OUTPATIENT)
Dept: NEPHROLOGY | Facility: CLINIC | Age: 58
End: 2023-09-12

## 2023-09-12 DIAGNOSIS — N18.32 STAGE 3B CHRONIC KIDNEY DISEASE (HCC): Primary | ICD-10-CM

## 2023-09-12 DIAGNOSIS — N17.9 AKI (ACUTE KIDNEY INJURY) (HCC): ICD-10-CM

## 2023-09-12 DIAGNOSIS — N20.0 NEPHROLITHIASIS: ICD-10-CM

## 2023-09-12 DIAGNOSIS — I10 ESSENTIAL HYPERTENSION: ICD-10-CM

## 2023-09-13 ENCOUNTER — APPOINTMENT (EMERGENCY)
Dept: RADIOLOGY | Facility: HOSPITAL | Age: 58
End: 2023-09-13
Payer: COMMERCIAL

## 2023-09-13 ENCOUNTER — HOSPITAL ENCOUNTER (INPATIENT)
Facility: HOSPITAL | Age: 58
LOS: 3 days | Discharge: HOME | End: 2023-09-16
Attending: EMERGENCY MEDICINE | Admitting: INTERNAL MEDICINE
Payer: COMMERCIAL

## 2023-09-13 DIAGNOSIS — I50.32 CHRONIC DIASTOLIC CHF (CONGESTIVE HEART FAILURE) (HCC): ICD-10-CM

## 2023-09-13 DIAGNOSIS — R06.02 SHORTNESS OF BREATH: ICD-10-CM

## 2023-09-13 DIAGNOSIS — I50.9 CHF EXACERBATION (HCC): Primary | ICD-10-CM

## 2023-09-13 PROBLEM — R20.0 FACIAL NUMBNESS: Status: ACTIVE | Noted: 2023-09-13

## 2023-09-13 LAB
2HR DELTA HS TROPONIN: 3 NG/L
4HR DELTA HS TROPONIN: -5 NG/L
ALBUMIN SERPL BCP-MCNC: 3.8 G/DL (ref 3.5–5)
ALP SERPL-CCNC: 71 U/L (ref 34–104)
ALT SERPL W P-5'-P-CCNC: 16 U/L (ref 7–52)
ANION GAP SERPL CALCULATED.3IONS-SCNC: 6 MMOL/L
AST SERPL W P-5'-P-CCNC: 33 U/L (ref 13–39)
BASOPHILS # BLD AUTO: 0.02 THOUSANDS/ÂΜL (ref 0–0.1)
BASOPHILS NFR BLD AUTO: 1 % (ref 0–1)
BILIRUB SERPL-MCNC: 0.33 MG/DL (ref 0.2–1)
BNP SERPL-MCNC: 1061 PG/ML (ref 0–100)
BUN SERPL-MCNC: 28 MG/DL (ref 5–25)
CALCIUM SERPL-MCNC: 9.2 MG/DL (ref 8.4–10.2)
CARDIAC TROPONIN I PNL SERPL HS: 59 NG/L
CARDIAC TROPONIN I PNL SERPL HS: 64 NG/L
CARDIAC TROPONIN I PNL SERPL HS: 67 NG/L
CHLORIDE SERPL-SCNC: 107 MMOL/L (ref 96–108)
CO2 SERPL-SCNC: 27 MMOL/L (ref 21–32)
CREAT SERPL-MCNC: 1.97 MG/DL (ref 0.6–1.3)
EOSINOPHIL # BLD AUTO: 0.17 THOUSAND/ÂΜL (ref 0–0.61)
EOSINOPHIL NFR BLD AUTO: 4 % (ref 0–6)
ERYTHROCYTE [DISTWIDTH] IN BLOOD BY AUTOMATED COUNT: 16.2 % (ref 11.6–15.1)
GFR SERPL CREATININE-BSD FRML MDRD: 27 ML/MIN/1.73SQ M
GLUCOSE SERPL-MCNC: 103 MG/DL (ref 65–140)
HCT VFR BLD AUTO: 38.5 % (ref 34.8–46.1)
HGB BLD-MCNC: 11.8 G/DL (ref 11.5–15.4)
IMM GRANULOCYTES # BLD AUTO: 0.02 THOUSAND/UL (ref 0–0.2)
IMM GRANULOCYTES NFR BLD AUTO: 1 % (ref 0–2)
LYMPHOCYTES # BLD AUTO: 0.85 THOUSANDS/ÂΜL (ref 0.6–4.47)
LYMPHOCYTES NFR BLD AUTO: 20 % (ref 14–44)
MCH RBC QN AUTO: 26.3 PG (ref 26.8–34.3)
MCHC RBC AUTO-ENTMCNC: 30.6 G/DL (ref 31.4–37.4)
MCV RBC AUTO: 86 FL (ref 82–98)
MONOCYTES # BLD AUTO: 0.36 THOUSAND/ÂΜL (ref 0.17–1.22)
MONOCYTES NFR BLD AUTO: 8 % (ref 4–12)
NEUTROPHILS # BLD AUTO: 2.93 THOUSANDS/ÂΜL (ref 1.85–7.62)
NEUTS SEG NFR BLD AUTO: 66 % (ref 43–75)
NRBC BLD AUTO-RTO: 0 /100 WBCS
PLATELET # BLD AUTO: 131 THOUSANDS/UL (ref 149–390)
PMV BLD AUTO: 12.3 FL (ref 8.9–12.7)
POTASSIUM SERPL-SCNC: 4.7 MMOL/L (ref 3.5–5.3)
PROT SERPL-MCNC: 7.2 G/DL (ref 6.4–8.4)
RBC # BLD AUTO: 4.48 MILLION/UL (ref 3.81–5.12)
SODIUM SERPL-SCNC: 140 MMOL/L (ref 135–147)
WBC # BLD AUTO: 4.35 THOUSAND/UL (ref 4.31–10.16)

## 2023-09-13 PROCEDURE — 84484 ASSAY OF TROPONIN QUANT: CPT

## 2023-09-13 PROCEDURE — 93005 ELECTROCARDIOGRAM TRACING: CPT

## 2023-09-13 PROCEDURE — 99285 EMERGENCY DEPT VISIT HI MDM: CPT | Performed by: EMERGENCY MEDICINE

## 2023-09-13 PROCEDURE — 71045 X-RAY EXAM CHEST 1 VIEW: CPT

## 2023-09-13 PROCEDURE — 99223 1ST HOSP IP/OBS HIGH 75: CPT | Performed by: INTERNAL MEDICINE

## 2023-09-13 PROCEDURE — 80053 COMPREHEN METABOLIC PANEL: CPT

## 2023-09-13 PROCEDURE — 36415 COLL VENOUS BLD VENIPUNCTURE: CPT

## 2023-09-13 PROCEDURE — 85025 COMPLETE CBC W/AUTO DIFF WBC: CPT

## 2023-09-13 PROCEDURE — 83880 ASSAY OF NATRIURETIC PEPTIDE: CPT

## 2023-09-13 PROCEDURE — 99285 EMERGENCY DEPT VISIT HI MDM: CPT

## 2023-09-13 PROCEDURE — 99254 IP/OBS CNSLTJ NEW/EST MOD 60: CPT | Performed by: INTERNAL MEDICINE

## 2023-09-13 RX ORDER — ACETAMINOPHEN 325 MG/1
650 TABLET ORAL EVERY 6 HOURS PRN
Status: DISCONTINUED | OUTPATIENT
Start: 2023-09-13 | End: 2023-09-16 | Stop reason: HOSPADM

## 2023-09-13 RX ORDER — DOXAZOSIN MESYLATE 1 MG/1
2 TABLET ORAL
Status: DISCONTINUED | OUTPATIENT
Start: 2023-09-13 | End: 2023-09-16 | Stop reason: HOSPADM

## 2023-09-13 RX ORDER — TORSEMIDE 20 MG/1
20 TABLET ORAL
Status: DISCONTINUED | OUTPATIENT
Start: 2023-09-13 | End: 2023-09-13

## 2023-09-13 RX ORDER — SUMATRIPTAN 6 MG/.5ML
6 INJECTION, SOLUTION SUBCUTANEOUS
Status: DISCONTINUED | OUTPATIENT
Start: 2023-09-13 | End: 2023-09-16 | Stop reason: HOSPADM

## 2023-09-13 RX ORDER — MAGNESIUM SULFATE HEPTAHYDRATE 40 MG/ML
2 INJECTION, SOLUTION INTRAVENOUS
Status: DISCONTINUED | OUTPATIENT
Start: 2023-09-13 | End: 2023-09-15

## 2023-09-13 RX ORDER — PANTOPRAZOLE SODIUM 40 MG/1
40 TABLET, DELAYED RELEASE ORAL DAILY
Status: DISCONTINUED | OUTPATIENT
Start: 2023-09-13 | End: 2023-09-16 | Stop reason: HOSPADM

## 2023-09-13 RX ORDER — ACETAMINOPHEN 325 MG/1
650 TABLET ORAL ONCE
Status: COMPLETED | OUTPATIENT
Start: 2023-09-13 | End: 2023-09-13

## 2023-09-13 RX ORDER — TORSEMIDE 20 MG/1
40 TABLET ORAL EVERY MORNING
Status: DISCONTINUED | OUTPATIENT
Start: 2023-09-13 | End: 2023-09-13

## 2023-09-13 RX ORDER — FUROSEMIDE 10 MG/ML
50 INJECTION INTRAMUSCULAR; INTRAVENOUS
Status: DISCONTINUED | OUTPATIENT
Start: 2023-09-13 | End: 2023-09-14 | Stop reason: SDUPTHER

## 2023-09-13 RX ORDER — POTASSIUM CHLORIDE 20 MEQ/1
20 TABLET, EXTENDED RELEASE ORAL DAILY
Status: DISCONTINUED | OUTPATIENT
Start: 2023-09-13 | End: 2023-09-14

## 2023-09-13 RX ORDER — METOCLOPRAMIDE HYDROCHLORIDE 5 MG/ML
10 INJECTION INTRAMUSCULAR; INTRAVENOUS EVERY 8 HOURS SCHEDULED
Status: COMPLETED | OUTPATIENT
Start: 2023-09-13 | End: 2023-09-16

## 2023-09-13 RX ORDER — METOPROLOL SUCCINATE 50 MG/1
50 TABLET, EXTENDED RELEASE ORAL DAILY
Status: DISCONTINUED | OUTPATIENT
Start: 2023-09-13 | End: 2023-09-16 | Stop reason: HOSPADM

## 2023-09-13 RX ORDER — DOXYCYCLINE HYCLATE 50 MG/1
162 CAPSULE, GELATIN COATED ORAL 3 TIMES WEEKLY
Status: DISCONTINUED | OUTPATIENT
Start: 2023-09-13 | End: 2023-09-16 | Stop reason: HOSPADM

## 2023-09-13 RX ORDER — NYSTATIN 100000 [USP'U]/G
POWDER TOPICAL 2 TIMES DAILY
Status: DISCONTINUED | OUTPATIENT
Start: 2023-09-13 | End: 2023-09-16 | Stop reason: HOSPADM

## 2023-09-13 RX ORDER — TORSEMIDE 20 MG/1
40 TABLET ORAL ONCE
Status: COMPLETED | OUTPATIENT
Start: 2023-09-13 | End: 2023-09-13

## 2023-09-13 RX ORDER — DIPHENHYDRAMINE HYDROCHLORIDE 50 MG/ML
25 INJECTION INTRAMUSCULAR; INTRAVENOUS EVERY 8 HOURS SCHEDULED
Status: COMPLETED | OUTPATIENT
Start: 2023-09-13 | End: 2023-09-16

## 2023-09-13 RX ADMIN — RIVAROXABAN 15 MG: 15 TABLET, FILM COATED ORAL at 17:30

## 2023-09-13 RX ADMIN — ACETAMINOPHEN 650 MG: 325 TABLET, FILM COATED ORAL at 06:25

## 2023-09-13 RX ADMIN — METOCLOPRAMIDE 10 MG: 5 INJECTION, SOLUTION INTRAMUSCULAR; INTRAVENOUS at 14:13

## 2023-09-13 RX ADMIN — NICOTINE 1 PATCH: 7 PATCH, EXTENDED RELEASE TRANSDERMAL at 12:17

## 2023-09-13 RX ADMIN — POTASSIUM CHLORIDE 20 MEQ: 1500 TABLET, EXTENDED RELEASE ORAL at 12:20

## 2023-09-13 RX ADMIN — DIPHENHYDRAMINE HYDROCHLORIDE 25 MG: 50 INJECTION, SOLUTION INTRAMUSCULAR; INTRAVENOUS at 22:19

## 2023-09-13 RX ADMIN — NYSTATIN: 100000 POWDER TOPICAL at 18:19

## 2023-09-13 RX ADMIN — PANTOPRAZOLE SODIUM 40 MG: 40 TABLET, DELAYED RELEASE ORAL at 12:20

## 2023-09-13 RX ADMIN — ACETAMINOPHEN 650 MG: 325 TABLET, FILM COATED ORAL at 21:57

## 2023-09-13 RX ADMIN — METOCLOPRAMIDE 10 MG: 5 INJECTION, SOLUTION INTRAMUSCULAR; INTRAVENOUS at 22:19

## 2023-09-13 RX ADMIN — TORSEMIDE 40 MG: 20 TABLET ORAL at 06:28

## 2023-09-13 RX ADMIN — FUROSEMIDE 50 MG: 10 INJECTION, SOLUTION INTRAMUSCULAR; INTRAVENOUS at 12:18

## 2023-09-13 RX ADMIN — FUROSEMIDE 50 MG: 10 INJECTION, SOLUTION INTRAMUSCULAR; INTRAVENOUS at 17:31

## 2023-09-13 RX ADMIN — MAGNESIUM SULFATE HEPTAHYDRATE 2 G: 40 INJECTION, SOLUTION INTRAVENOUS at 12:21

## 2023-09-13 RX ADMIN — DIPHENHYDRAMINE HYDROCHLORIDE 25 MG: 50 INJECTION, SOLUTION INTRAMUSCULAR; INTRAVENOUS at 14:13

## 2023-09-13 RX ADMIN — METOPROLOL SUCCINATE 50 MG: 50 TABLET, EXTENDED RELEASE ORAL at 12:19

## 2023-09-13 RX ADMIN — FERROUS GLUCONATE 162 MG: 324 TABLET ORAL at 12:21

## 2023-09-13 NOTE — ASSESSMENT & PLAN NOTE
Lab Results   Component Value Date    HSTNI0 64 (H) 09/13/2023    HSTNI2 67 (H) 09/13/2023    HSTNID2 3 09/13/2023    HSTNI4 59 (H) 09/13/2023    HSTNID4 -5 09/13/2023     Most likely due to CHF exacerbation
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
## 2023-09-13 NOTE — ASSESSMENT & PLAN NOTE
Wt Readings from Last 3 Encounters:   09/13/23 115 kg (252 lb 10.4 oz)   09/09/23 108 kg (238 lb 1.6 oz)   08/31/23 109 kg (240 lb 9.6 oz)     14 lb weight gain in 4 days. BNP of 1060. Last echo in august. Preserved EF of 70%. Possible renal etiology. Plan  Tele  Cardiology and Nephro consult  IV Lasix 50 mg TID. Consider IV Bumex.
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
## 2023-09-13 NOTE — LETTER
[address] [place] [address] FLOOR MED SURG UNIT  [address] [address]  Dept: [phone]    September 16, 2023     Patient: Urbano Small   YOB: 1965   Date of Visit: 9/13/2023       To Whom it May Concern:    Oswaldo Moore is under my professional care. She was seen in the hospital from 9/13/2023 to 09/16/23. She may return to work on 9/18/2023 without limitations. If you have any questions or concerns, please don't hesitate to call.          Sincerely,          Madai Rock, DO

## 2023-09-13 NOTE — ED NOTES
PT WAS ABLE TO AMBULATE UNASSISTED 02 STATS 98 HEART RATE 87. PT STATED SHE FELT A LITTLE LIGHT HEADED     Chevy s  09/13/23 0875
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
## 2023-09-13 NOTE — ASSESSMENT & PLAN NOTE
Patient c/o R facial numbness since this morning. Improved now. Most likely due to hypertensive urgency vs neurologic since asymptomatic otherwise.      Plan  Q4 neuro checks

## 2023-09-13 NOTE — ED ATTENDING ATTESTATION
9/13/2023  IJulieta DO, saw and evaluated the patient. I have discussed the patient with the resident/non-physician practitioner and agree with the resident's/non-physician practitioner's findings, Plan of Care, and MDM as documented in the resident's/non-physician practitioner's note, except where noted. All available labs and Radiology studies were reviewed. I was present for key portions of any procedure(s) performed by the resident/non-physician practitioner and I was immediately available to provide assistance. At this point I agree with the current assessment done in the Emergency Department. I have conducted an independent evaluation of this patient a history and physical is as follows:    Patient is a 40-year-old female with a history of congestive heart failure, ventricular tachycardia status post AICD who presents with shortness of breath and lower extremity edema. Patient was recently hospitalized for acute exacerbation of congestive heart failure. Patient is on torsemide as an outpatient. However she has not had her dose today or yesterday. She describes increasing lower extremity edema as well as shortness of breath. Shortness of breath is worse with exertion. She also admits to a left-sided chest pain since this morning. She admits to a cough, productive of green sputum. She denies fevers or chills. She describes numbness in her right face which she just noticed this morning. On exam, patient is in no acute distress. Heart is regular rate and rhythm. Decreased breath sounds in bilateral lower lung fields. Abdomen is soft, nontender. Pitting edema in bilateral lower extremities. Mild decrease sensation to light touch in V2 distribution. However sensation in face is otherwise normal.  Normal sensation in extremities. No motor deficits. Cerebellar exam normal.  Speech fluent. Visual fields intact. Chest x-ray reveals increased pulmonary vascular congestion. Patient has put on a significant amount of weight in the past 4 days. She has an elevated BNP. Concern for acute on chronic CHF. Patient was initially given home dose of torsemide, but will likely need IV Bumex. Portions of the above record have been created with voice recognition software. Occasional wrong word or "sound alike" substitutions may have occurred due to the inherent limitations of voice recognition software. Read the chart carefully and recognize, using context, where substitutions may have occurred.       Wt Readings from Last 3 Encounters:   09/13/23 115 kg (252 lb 10.4 oz)   09/09/23 108 kg (238 lb 1.6 oz)   08/31/23 109 kg (240 lb 9.6 oz)       ED Course         Critical Care Time  Procedures

## 2023-09-13 NOTE — PLAN OF CARE
Problem: MOBILITY - ADULT  Goal: Maintain or return to baseline ADL function  Description: INTERVENTIONS:  -  Assess patient's ability to carry out ADLs; assess patient's baseline for ADL function and identify physical deficits which impact ability to perform ADLs (bathing, care of mouth/teeth, toileting, grooming, dressing, etc.)  - Assess/evaluate cause of self-care deficits   - Assess range of motion  - Assess patient's mobility; develop plan if impaired  - Assess patient's need for assistive devices and provide as appropriate  - Encourage maximum independence but intervene and supervise when necessary  - Involve family in performance of ADLs  - Assess for home care needs following discharge   - Consider OT consult to assist with ADL evaluation and planning for discharge  - Provide patient education as appropriate  9/13/2023 1250 by Belinda Saunders RN  Outcome: Progressing  9/13/2023 1249 by Belinda Saunders RN  Outcome: Progressing  Goal: Maintains/Returns to pre admission functional level  Description: INTERVENTIONS:  - Perform BMAT or MOVE assessment daily.   - Set and communicate daily mobility goal to care team and patient/family/caregiver.    - Collaborate with rehabilitation services on mobility goals if consulted  - Out of bed for toileting  - Record patient progress and toleration of activity level   9/13/2023 1250 by Belinda Saunders RN  Outcome: Progressing  9/13/2023 1249 by Belinda Saunders RN  Outcome: Progressing

## 2023-09-14 LAB
ANION GAP SERPL CALCULATED.3IONS-SCNC: 7 MMOL/L
ATRIAL RATE: 312 BPM
BUN SERPL-MCNC: 27 MG/DL (ref 5–25)
CALCIUM SERPL-MCNC: 8.7 MG/DL (ref 8.4–10.2)
CHLORIDE SERPL-SCNC: 104 MMOL/L (ref 96–108)
CO2 SERPL-SCNC: 29 MMOL/L (ref 21–32)
CREAT SERPL-MCNC: 2.38 MG/DL (ref 0.6–1.3)
ERYTHROCYTE [DISTWIDTH] IN BLOOD BY AUTOMATED COUNT: 16.2 % (ref 11.6–15.1)
GFR SERPL CREATININE-BSD FRML MDRD: 21 ML/MIN/1.73SQ M
GLUCOSE SERPL-MCNC: 81 MG/DL (ref 65–140)
HCT VFR BLD AUTO: 36.8 % (ref 34.8–46.1)
HGB BLD-MCNC: 10.8 G/DL (ref 11.5–15.4)
MCH RBC QN AUTO: 25.8 PG (ref 26.8–34.3)
MCHC RBC AUTO-ENTMCNC: 29.3 G/DL (ref 31.4–37.4)
MCV RBC AUTO: 88 FL (ref 82–98)
P AXIS: 91 DEGREES
PLATELET # BLD AUTO: 110 THOUSANDS/UL (ref 149–390)
PMV BLD AUTO: 11.3 FL (ref 8.9–12.7)
POTASSIUM SERPL-SCNC: 4.5 MMOL/L (ref 3.5–5.3)
QRS AXIS: -67 DEGREES
QRSD INTERVAL: 182 MS
QT INTERVAL: 540 MS
QTC INTERVAL: 548 MS
RBC # BLD AUTO: 4.19 MILLION/UL (ref 3.81–5.12)
SODIUM SERPL-SCNC: 140 MMOL/L (ref 135–147)
T WAVE AXIS: 101 DEGREES
VENTRICULAR RATE: 62 BPM
WBC # BLD AUTO: 3.58 THOUSAND/UL (ref 4.31–10.16)

## 2023-09-14 PROCEDURE — 93010 ELECTROCARDIOGRAM REPORT: CPT | Performed by: INTERNAL MEDICINE

## 2023-09-14 PROCEDURE — 85027 COMPLETE CBC AUTOMATED: CPT

## 2023-09-14 PROCEDURE — 80048 BASIC METABOLIC PNL TOTAL CA: CPT

## 2023-09-14 PROCEDURE — 99232 SBSQ HOSP IP/OBS MODERATE 35: CPT | Performed by: STUDENT IN AN ORGANIZED HEALTH CARE EDUCATION/TRAINING PROGRAM

## 2023-09-14 PROCEDURE — 99232 SBSQ HOSP IP/OBS MODERATE 35: CPT | Performed by: INTERNAL MEDICINE

## 2023-09-14 RX ORDER — FUROSEMIDE 10 MG/ML
50 INJECTION INTRAMUSCULAR; INTRAVENOUS
Status: DISCONTINUED | OUTPATIENT
Start: 2023-09-14 | End: 2023-09-15

## 2023-09-14 RX ORDER — POTASSIUM CHLORIDE 750 MG/1
10 TABLET, EXTENDED RELEASE ORAL DAILY
Status: DISCONTINUED | OUTPATIENT
Start: 2023-09-15 | End: 2023-09-16 | Stop reason: HOSPADM

## 2023-09-14 RX ADMIN — PANTOPRAZOLE SODIUM 40 MG: 40 TABLET, DELAYED RELEASE ORAL at 08:04

## 2023-09-14 RX ADMIN — ACETAMINOPHEN 650 MG: 325 TABLET, FILM COATED ORAL at 11:41

## 2023-09-14 RX ADMIN — POTASSIUM CHLORIDE 20 MEQ: 1500 TABLET, EXTENDED RELEASE ORAL at 08:04

## 2023-09-14 RX ADMIN — RIVAROXABAN 15 MG: 15 TABLET, FILM COATED ORAL at 17:19

## 2023-09-14 RX ADMIN — FUROSEMIDE 50 MG: 10 INJECTION, SOLUTION INTRAMUSCULAR; INTRAVENOUS at 11:37

## 2023-09-14 RX ADMIN — NYSTATIN: 100000 POWDER TOPICAL at 08:08

## 2023-09-14 RX ADMIN — DIPHENHYDRAMINE HYDROCHLORIDE 25 MG: 50 INJECTION, SOLUTION INTRAMUSCULAR; INTRAVENOUS at 13:16

## 2023-09-14 RX ADMIN — FUROSEMIDE 50 MG: 10 INJECTION, SOLUTION INTRAMUSCULAR; INTRAVENOUS at 18:30

## 2023-09-14 RX ADMIN — MAGNESIUM SULFATE HEPTAHYDRATE 2 G: 40 INJECTION, SOLUTION INTRAVENOUS at 08:07

## 2023-09-14 RX ADMIN — FUROSEMIDE 50 MG: 10 INJECTION, SOLUTION INTRAMUSCULAR; INTRAVENOUS at 06:07

## 2023-09-14 RX ADMIN — NICOTINE 1 PATCH: 7 PATCH, EXTENDED RELEASE TRANSDERMAL at 08:07

## 2023-09-14 RX ADMIN — NYSTATIN: 100000 POWDER TOPICAL at 17:19

## 2023-09-14 RX ADMIN — METOCLOPRAMIDE 10 MG: 5 INJECTION, SOLUTION INTRAMUSCULAR; INTRAVENOUS at 21:00

## 2023-09-14 RX ADMIN — METOPROLOL SUCCINATE 50 MG: 50 TABLET, EXTENDED RELEASE ORAL at 08:04

## 2023-09-14 RX ADMIN — DIPHENHYDRAMINE HYDROCHLORIDE 25 MG: 50 INJECTION, SOLUTION INTRAMUSCULAR; INTRAVENOUS at 05:29

## 2023-09-14 RX ADMIN — DIPHENHYDRAMINE HYDROCHLORIDE 25 MG: 50 INJECTION, SOLUTION INTRAMUSCULAR; INTRAVENOUS at 20:59

## 2023-09-14 RX ADMIN — DOXAZOSIN 2 MG: 1 TABLET ORAL at 20:58

## 2023-09-14 RX ADMIN — METOCLOPRAMIDE 10 MG: 5 INJECTION, SOLUTION INTRAMUSCULAR; INTRAVENOUS at 05:27

## 2023-09-14 RX ADMIN — METOCLOPRAMIDE 10 MG: 5 INJECTION, SOLUTION INTRAMUSCULAR; INTRAVENOUS at 13:16

## 2023-09-14 NOTE — ASSESSMENT & PLAN NOTE
Lab Results   Component Value Date    HSTNI0 64 (H) 09/13/2023    HSTNI2 67 (H) 09/13/2023    HSTNID2 3 09/13/2023    HSTNI4 59 (H) 09/13/2023    HSTNID4 -5 09/13/2023     Most likely due to CHF exacerbation, resolved

## 2023-09-14 NOTE — ASSESSMENT & PLAN NOTE
Patient with pacemaker.  On metoprolol and xarelto    Plan  Cardio on board, continue metoprolol and renally dosed xarelto, per cardio

## 2023-09-14 NOTE — PROGRESS NOTES
[address] [facility]  Progress Note  Name: Jana Atkins  MRN: [id number]  Unit/Bed#: S -47 I Date of Admission: 9/13/2023   Date of Service: 9/14/2023 I Hospital Day: 1    Assessment/Plan   * Chronic heart failure with preserved ejection fraction (HFpEF) [facility])  Assessment & Plan  Wt Readings from Last 3 Encounters:   09/14/23 112 kg (245 lb 14.4 oz)   09/09/23 108 kg (238 lb 1.6 oz)   08/31/23 109 kg (240 lb 9.6 oz)     14 lb weight gain in 4 days. BNP of 1060. Last echo in august. Preserved EF of 70%. Possible renal etiology. Plan:   -Continue Tele  -Cardiology and Nephro consult, appreciate recs  -IV Lasix 50 mg TID is working well, continue for now. Consider IV Bumex if no further improvement. -Monitor I/Os, weights closely  -discharge on diuretics prescription    Facial numbness  Assessment & Plan  Patient c/o R facial numbness since this morning. Improved now. Most likely due to hypertensive urgency vs neurologic since asymptomatic otherwise. Plan  Continue to monitor as this is currently resolved. Bilateral lower extremity edema  Assessment & Plan  2/2 CHF exacerbation. Plan  Continue Lasix 50 mg TID. Consider transitioning to Bumex if ineffective. Essential hypertension  Assessment & Plan   SBP. Improved. Vitals:    09/14/23 1211   BP: 123/77   Pulse:    Resp:    Temp: 97.7 °F (36.5 °C)   SpO2:          Plan:   BP improved  Continue to monitor      Paroxysmal A-fib [facility])  Assessment & Plan  Patient with pacemaker.  On metoprolol and xarelto    Plan  Cardio on board, continue metoprolol and renally dosed xarelto, per cardio    Elevated troponin  Assessment & Plan  Lab Results   Component Value Date    HSTNI0 64 (H) 09/13/2023    HSTNI2 67 (H) 09/13/2023    HSTNID2 3 09/13/2023    HSTNI4 59 (H) 09/13/2023    HSTNID4 -5 09/13/2023     Most likely due to CHF exacerbation, resolved    Headache  Assessment & Plan  Recurrent headaches present in past admissions. Plan  -Migraine cocktail-avoid NSAID with CKD. VTE Pharmacologic Prophylaxis: VTE Score: 3 Moderate Risk (Score 3-4) - Pharmacological DVT Prophylaxis Ordered: rivaroxaban (Xarelto). Patient Centered Rounds: I performed bedside rounds with nursing staff today. Discussions with Specialists or Other Care Team Provider: Cardiology, Nephrology    Education and Discussions with Family / Patient: Patient declined call to . Current Length of Stay: 1 day(s)  Current Patient Status: Inpatient   Discharge Plan: Anticipate discharge in 24-48 hrs to home. Code Status: Level 1 - Full Code    Subjective:   Patient seen at bedside today. No acute events overnight. Patient states that she is breathing much better today. And also says that she has no lower extremity swelling anymore. She reports that her dry weight is between 230 to 235 pounds. Currently she is diuresing well and is urinating a lot. Objective:     Vitals:   Temp (24hrs), Av.8 °F (36.6 °C), Min:97.5 °F (36.4 °C), Max:98.1 °F (36.7 °C)    Temp:  [97.5 °F (36.4 °C)-98.1 °F (36.7 °C)] 97.7 °F (36.5 °C)  HR:  [60-63] 60  Resp:  [17-18] 17  BP: ()/(51-81) 123/77  SpO2:  [94 %] 94 %  Body mass index is 38.51 kg/m². Input and Output Summary (last 24 hours): Intake/Output Summary (Last 24 hours) at 2023 1354  Last data filed at 2023 1331  Gross per 24 hour   Intake 657 ml   Output 1100 ml   Net -443 ml       Physical Exam:   Physical Exam  Vitals and nursing note reviewed. Constitutional:       General: She is not in acute distress. Appearance: Normal appearance. She is obese. She is not ill-appearing, toxic-appearing or diaphoretic. HENT:      Head: Normocephalic and atraumatic. Eyes:      General: No scleral icterus. Pupils: Pupils are equal, round, and reactive to light.    Neck:      Comments: JVD present  Cardiovascular:      Rate and Rhythm: Normal rate and regular rhythm. Pulses: Normal pulses. Heart sounds: Normal heart sounds. No friction rub. No gallop. Pulmonary:      Effort: Pulmonary effort is normal. No respiratory distress. Breath sounds: Wheezing (expiratory wheezing) present. No rhonchi or rales. Abdominal:      General: There is no distension. Tenderness: There is no abdominal tenderness. There is no guarding. Hernia: No hernia is present. Musculoskeletal:         General: No swelling. Normal range of motion. Cervical back: Normal range of motion. No rigidity. Right lower leg: No edema. Left lower leg: No edema. Skin:     General: Skin is warm. Coloration: Skin is not jaundiced. Neurological:      General: No focal deficit present. Mental Status: She is alert and oriented to person, place, and time. Psychiatric:         Mood and Affect: Mood normal.         Behavior: Behavior normal.         Thought Content:  Thought content normal.         Judgment: Judgment normal.          Additional Data:     Labs:  Results from last 7 days   Lab Units 09/14/23  0451 09/13/23  0655   WBC Thousand/uL 3.58* 4.35   HEMOGLOBIN g/dL 10.8* 11.8   HEMATOCRIT % 36.8 38.5   PLATELETS Thousands/uL 110* 131*   NEUTROS PCT %  --  66   LYMPHS PCT %  --  20   MONOS PCT %  --  8   EOS PCT %  --  4     Results from last 7 days   Lab Units 09/14/23  0451 09/13/23  0655   SODIUM mmol/L 140 140   POTASSIUM mmol/L 4.5 4.7   CHLORIDE mmol/L 104 107   CO2 mmol/L 29 27   BUN mg/dL 27* 28*   CREATININE mg/dL 2.38* 1.97*   ANION GAP mmol/L 7 6   CALCIUM mg/dL 8.7 9.2   ALBUMIN g/dL  --  3.8   TOTAL BILIRUBIN mg/dL  --  0.33   ALK PHOS U/L  --  71   ALT U/L  --  16   AST U/L  --  33   GLUCOSE RANDOM mg/dL 81 103                       Lines/Drains:  Invasive Devices     Peripheral Intravenous Line  Duration           Peripheral IV 09/13/23 Right;Ventral (anterior) Forearm 1 day                  Telemetry:  Telemetry Orders (From admission, onward)             24 Hour Telemetry Monitoring  Continuous x 24 Hours (Telem)           Question:  Reason for 24 Hour Telemetry  Answer:  Decompensated CHF- and any one of the following: continuous diuretic infusion or total diuretic dose >200 mg daily, associated electrolyte derangement (I.e. K < 3.0), ionotropic drip (continuous infusion), hx of ventricular arrhythmia, or new EF < 35%                 Telemetry Reviewed: Normal Sinus Rhythm  Indication for Continued Telemetry Use: Arrthymias requiring medical therapy             Imaging: Reviewed radiology reports from this admission including: chest xray  XR chest 1 view portable   ED Interpretation by Nicole Chatman MD ( [id number])   This was independently interpreted by me. Lung fields remarkable for pulmonary congestion.       Final Result by Belkis Quintero MD ( [id number])      Interval development of mild pulmonary vascular congestion               Workstation performed: [id number]             Recent Cultures (last 7 days):         Last 24 Hours Medication List:   Current Facility-Administered Medications   Medication Dose Route Frequency Provider Last Rate   • acetaminophen  650 mg Oral Q6H PRN Tevin Grossman MD     • Diclofenac Sodium  2 g Topical Q6H PRN Mejia Thompson MD     • diphenhydrAMINE  25 mg Intravenous Q8H Odalys Lorenzo MD     • doxazosin  2 mg Oral HS Mejia Thompson MD     • ferrous gluconate  162 mg Oral Once per day on  Mejia Thompson MD     • furosemide  50 mg Intravenous TID (diuretic) Mejia Thompson MD     • magnesium sulfate  2 g Intravenous Q24H [address] Mejia Thompson MD 2 g ([id number])   • metoclopramide  10 mg Intravenous Q8H Odalys Lorenzo MD     • metoprolol succinate  50 mg Oral Daily Mejia Thompson MD     • nicotine  1 patch Transdermal Daily Mejia Thompson MD     • nystatin   Topical BID Arlethtyrel Farfan DO     • pantoprazole  40 mg Oral Daily Mejia Thompson MD     • [START ON 9/15/2023] potassium chloride  10 mEq Oral Daily Michael Joel MD     • rivaroxaban  15 mg Oral QPM Todd Jeans, MD     • SUMAtriptan  6 mg Subcutaneous Q1H PRN Todd Jeans, MD          Today, Patient Was Seen By: Wanda Snider DO    **Please Note: This note may have been constructed using a voice recognition system. **

## 2023-09-14 NOTE — ASSESSMENT & PLAN NOTE
Patient c/o R facial numbness since this morning. Improved now. Most likely due to hypertensive urgency vs neurologic since asymptomatic otherwise. Plan  Continue to monitor as this is currently resolved.

## 2023-09-14 NOTE — PLAN OF CARE
Problem: MOBILITY - ADULT  Goal: Maintain or return to baseline ADL function  Description: INTERVENTIONS:  -  Assess patient's ability to carry out ADLs; assess patient's baseline for ADL function and identify physical deficits which impact ability to perform ADLs (bathing, care of mouth/teeth, toileting, grooming, dressing, etc.)  - Assess/evaluate cause of self-care deficits   - Assess range of motion  - Assess patient's mobility; develop plan if impaired  - Assess patient's need for assistive devices and provide as appropriate  - Encourage maximum independence but intervene and supervise when necessary  - Involve family in performance of ADLs  - Assess for home care needs following discharge   - Consider OT consult to assist with ADL evaluation and planning for discharge  - Provide patient education as appropriate  Outcome: Progressing  Goal: Maintains/Returns to pre admission functional level  Description: INTERVENTIONS:  - Perform BMAT or MOVE assessment daily.   - Set and communicate daily mobility goal to care team and patient/family/caregiver. - Collaborate with rehabilitation services on mobility goals if consulted  - Perform Range of Motion  times a day. - Reposition patient every  hours.   - Dangle patient  times a day  - Stand patient  times a day  - Ambulate patient  times a day  - Out of bed to chair times a day   - Out of bed for meals  times a day  - Out of bed for toileting  - Record patient progress and toleration of activity level   Outcome: Progressing

## 2023-09-14 NOTE — UTILIZATION REVIEW
Initial Clinical Review    Admission: Date/Time/Statement:   Admission Orders (From admission, onward)     Ordered        09/13/23 0832  INPATIENT ADMISSION  Once                      Orders Placed This Encounter   Procedures   • INPATIENT ADMISSION     Standing Status:   Standing     Number of Occurrences:   1     Order Specific Question:   Level of Care     Answer:   Med Surg [16]     Order Specific Question:   Estimated length of stay     Answer:   More than 2 Midnights     Order Specific Question:   Certification     Answer:   I certify that inpatient services are medically necessary for this patient for a duration of greater than two midnights. See H&P and MD Progress Notes for additional information about the patient's course of treatment. ED Arrival Information     Expected   -    Arrival   9/13/2023 05:41    Acuity   Emergent            Means of arrival   Walk-In    Escorted by   Spouse    Service   Hospitalist    Admission type   Emergency            Arrival complaint   sob/swelling/numb           Chief Complaint   Patient presents with   • Shortness of Breath     Pt presents with SOB and bilateral leg swelling that started last night. States she is on a water pill but ran out two days ago       Initial Presentation: 62 y.o. female history of heart failure with improved ejection fraction, Last echo 08/2023 with EF 70 %, nhistory of monomorphic VT s/p ICD, history of atrial fibrillation/flutter anticoagulated with Xarelto, CKD4 with baseline creatinine 1.7-2.1, SURINDER, tobacco use presents to ED from home with worsening shortness of breath, lower extremity edema. Pt ran out of her diuretics 2 days ago. Also complains of headache and right facial numbness which is improved since this morning and c/o cough. On exam, BP elevated, has BLE edema, normal breath sounds . 14 lb wt gain  In 4 days . Labs BNP 1060, elevated troponin 64--->67--->59 . Creat 1.97  CXR shows pulm vasc congestion .  Pt given Torsemide , lyte repletion and IV lasix in E,. Pt admitted as Inpatient to telemetry with acute on chronic heart failure, facial numbness--most likely due to hypertensive urgency vs neurologic. Plan - telemetry, cardiology and nephrology consuts, IV Lasix 50 mg TID. Consider IV Bumex. Neuro checks . Monitor BP. Migraine cocktail for migraines . Nephrology consult- run out of Torsemide on Tuesday, 9/12/23, and felt short of breath prompting her to come to the hospital.   On admission, her creatinine was found to be 1.97 but her chest x-ray showed vascular congestion. She is currently being treated for CHF with IV diuretics . Had ADNIELA episode in August 2023 and September 2023. PLan -  Monitor renal function while getting IV diuretics, continue same dosing as pt responding . Date: 9/14  Day 2: + ABDUL per nsg, pt feels she is breathing better today. Nephrology- creat up to 2.38 today serum HCO3 29 .   BP normotensive . Plan- renal diet, lo sodium . Continue IV lasix 50 mg TID . Decrease potassium chloride to 10 mg once daily . Metabolic alkalosis likely secondary to vascular contraction in the settings of diuretics . I/O = -143 ml last 24 hrs , wt decreasing  , 245 lbs today. Fine crackles on exam.   Cardiology consult- Weight of 235 lbs on 05/12/23, 242 lbs >> 236 lbs >> 252 lbs 9/13/23;245 lbs today. PT on Lasix 50 mg IV TID . Pt normotensive . On exam, elevated JVP. Trace edema . Recent creatinine 2-3, 2.38 today. PLan - continue IV diuresis. Dry wt per pt 230-235 lbs. Strict I/O, daily wts. Keep mag >2, K >4. Continue metoprolol and renally dosed Xarelto .      ED Triage Vitals   Temperature Pulse Respirations Blood Pressure SpO2   09/13/23 0549 09/13/23 0548 09/13/23 0548 09/13/23 0551 09/13/23 0548   97.7 °F (36.5 °C) 73 20 (!) 191/93 97 %      Temp Source Heart Rate Source Patient Position - Orthostatic VS BP Location FiO2 (%)   09/13/23 0549 09/13/23 0548 09/13/23 0548 09/13/23 0548 --   Oral Monitor Sitting Right arm       Pain Score       09/13/23 0548       10 - Worst Possible Pain          Wt Readings from Last 1 Encounters:   09/14/23 112 kg (245 lb 14.4 oz)     09/13/23 115 kg (252 lb 10.4 oz)   09/09/23 108 kg (238 lb 1.6 oz)   08/31/23 109 kg (240 lb 9.6 oz)       Additional Vital Signs:   Date/Time Temp Pulse Resp BP MAP (mmHg) SpO2 O2 Device Patient Position - Orthostatic VS   09/14/23 07:06:08 97.5 °F (36.4 °C) 60 18 128/77 94 94 % -- --   09/14/23 05:43:56 -- -- -- 123/76 92 -- -- --   09/14/23 05:23:48 -- 63 -- 132/74 93 -- -- --   09/13/23 22:17:03 98.1 °F (36.7 °C) -- -- 93/59 70 -- -- --   09/13/23 21:55:27 -- -- -- 108/51 70 -- -- --   09/13/23 18:21:39 -- -- -- 107/74 85 -- -- --   09/13/23 18:21:24 -- -- -- 107/74 85 -- -- --   09/13/23 1725 -- -- -- 115/67 -- -- -- --   09/13/23 15:05:38 97.8 °F (36.6 °C) -- -- 110/63 79 -- -- --   09/13/23 12:47:53 97.8 °F (36.6 °C) -- -- 137/87 104 -- -- --   09/13/23 1219 -- 77 -- 165/103 Abnormal  -- -- -- --   09/13/23 1213 97.8 °F (36.6 °C) 77 18 165/103 Abnormal  -- 98 % None (Room air) --   09/13/23 1100 -- 61 18 119/69 -- 98 % -- --   09/13/23 0626 -- 61 18 135/77 -- 96 % -- --   09/13/23 0600 -- 60 18 166/121 Abnormal  139 97 %       Pertinent Labs/Diagnostic Test Results:   XR chest 1 view portable   ED Interpretation by Leila Solis MD (09/13 [id number])   This was independently interpreted by me. Lung fields remarkable for pulmonary congestion.       Final Result by Saira Driver MD (27/59 [id number])      Interval development of mild pulmonary vascular congestion               Workstation performed: [id number]         9/13 ECG-Interpretation: normal     Rate:     ECG rate:  62 bpm     ECG rate assessment: normal     Rhythm:     Rhythm: paced     Pacing:     Capture:  Complete     Type of pacing:  Ventricular   Ectopy:     Ectopy: none       Results from last 7 days   Lab Units 09/08/23  1325   SARS-COV-2  Negative     Results from last 7 days   Lab Units 09/14/23  0451 09/13/23  0655 09/08/23  1230   WBC Thousand/uL 3.58* 4.35 7.10   HEMOGLOBIN g/dL 10.8* 11.8 12.5   HEMATOCRIT % 36.8 38.5 39.2   PLATELETS Thousands/uL 110* 131* 164   NEUTROS ABS Thousands/µL  --  2.93 5.37         Results from last 7 days   Lab Units 09/14/23  0451 09/13/23  0655 09/09/23  0551 09/08/23  1230   SODIUM mmol/L 140 140 137 136   POTASSIUM mmol/L 4.5 4.7 3.4* 2.8*   CHLORIDE mmol/L 104 107 97 94*   CO2 mmol/L 29 27 32 31   ANION GAP mmol/L 7 6 8 11   BUN mg/dL 27* 28* 49* 47*   CREATININE mg/dL 2.38* 1.97* 2.96* 3.15*   EGFR ml/min/1.73sq m 21 27 16 15   CALCIUM mg/dL 8.7 9.2 9.0 9.4   MAGNESIUM mg/dL  --   --  2.3  --      Results from last 7 days   Lab Units 09/13/23  0655 09/08/23  1230   AST U/L 33 33   ALT U/L 16 17   ALK PHOS U/L 71 77   TOTAL PROTEIN g/dL 7.2 8.0   ALBUMIN g/dL 3.8 4.2   TOTAL BILIRUBIN mg/dL 0.33 0.56         Results from last 7 days   Lab Units 09/14/23  0451 09/13/23  0655 09/09/23  0551 09/08/23  1230   GLUCOSE RANDOM mg/dL 81 103 95 129               Results from last 7 days   Lab Units 09/13/23  1104 09/13/23  0902 09/13/23  0655 09/08/23  1422 09/08/23  1230   HS TNI 0HR ng/L  --   --  64*  --  164*   HS TNI 2HR ng/L  --  67*  --  145*  --    HSTNI D2 ng/L  --  3  --  -19  --    HS TNI 4HR ng/L 59*  --   --   --   --    HSTNI D4 ng/L -5  --   --   --   --            Results from last 7 days   Lab Units 09/13/23  0655 09/08/23  1230   BNP pg/mL 1,061* 931*                   Results from last 7 days   Lab Units 09/08/23  1325   INFLUENZA A PCR  Negative   INFLUENZA B PCR  Negative   RSV PCR  Negative                 ED Treatment:   Medication Administration from 09/13/2023 0541 to 09/13/2023 1244       Date/Time Order Dose Route Action     09/13/2023 0625 EDT acetaminophen (TYLENOL) tablet 650 mg 650 mg Oral Given     09/13/2023 0628 EDT torsemide (DEMADEX) tablet 40 mg 40 mg Oral Given     09/13/2023 1226 EDT Diclofenac Sodium (VOLTAREN) 1 % topical gel 2 g 2 g Topical Not Given     09/13/2023 1221 EDT ferrous gluconate (FERGON) tablet 162 mg 162 mg Oral Given     09/13/2023 1219 EDT metoprolol succinate (TOPROL-XL) 24 hr tablet 50 mg 50 mg Oral Given     09/13/2023 1217 EDT nicotine (NICODERM CQ) 7 mg/24hr TD 24 hr patch 1 patch 1 patch Transdermal Medication Applied     09/13/2023 1220 EDT pantoprazole (PROTONIX) EC tablet 40 mg 40 mg Oral Given     09/13/2023 1220 EDT potassium chloride (K-DUR,KLOR-CON) CR tablet 20 mEq 20 mEq Oral Given     09/13/2023 1221 EDT magnesium sulfate 2 g/50 mL IVPB (premix) 2 g 2 g Intravenous New Bag     09/13/2023 1218 EDT furosemide (LASIX) injection 50 mg 50 mg Intravenous Given        Past Medical History:   Diagnosis Date   • ACS (acute coronary syndrome) ([address]) 02/07/2021   • Acute on chronic diastolic CHF 92/71/5007   • Anemia 1/14/2023   • Arthritis    • Atrial fibrillation (HCC)    • Atrial fibrillation with rapid ventricular response ([address]) 03/20/2016   • Breast lump    • CKD (chronic kidney disease) stage 3, GFR 30-59 ml/min ([facility])    • Disease of thyroid gland    • Elevated troponin 09/09/2019   • Epigastric abdominal tenderness 08/15/2021   • 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 ([address]) 05/25/2020   • GERD (gastroesophageal reflux disease)    • H/O transfusion 1987   • Hepatitis C     resolved   • History of tachycardia induced cardiomyopathy 05/2021    in setting of rapid atrial flutter in 05/2021 and again 06/2022   • History of ventricular tachycardia 07/2016    s/p ICD   • Hyperlipidemia    • Hypertension    • Hypokalemia 7/23/2023   • Inappropriate ICD discharge for atrial flutter 04/2023   • NSTEMI (non-ST elevated myocardial infarction) ([address]) 12/26/2018   • Sleep apnea     no cpap     Present on Admission:  • Chronic heart failure with preserved ejection fraction (HFpEF) ([facility])  • Paroxysmal A-fib ([address])  • Bilateral lower extremity edema  • Headache  • Essential hypertension  • Elevated troponin      Admitting Diagnosis: Shortness of breath [R06.02]  SOB (shortness of breath) [R06.02]  CHF exacerbation (HCC) [I50.9]  Age/Sex: 62 y.o. female  Admission Orders:  Scheduled Medications:  diphenhydrAMINE, 25 mg, Intravenous, Q8H JEISON  doxazosin, 2 mg, Oral, HS  ferrous gluconate, 162 mg, Oral, Once per day on Mon Wed Fri  furosemide, 50 mg, Intravenous, TID (diuretic)  magnesium sulfate, 2 g, Intravenous, Q24H JEISON  metoclopramide, 10 mg, Intravenous, Q8H JEISON  metoprolol succinate, 50 mg, Oral, Daily  nicotine, 1 patch, Transdermal, Daily  nystatin, , Topical, BID  pantoprazole, 40 mg, Oral, Daily  [START ON 9/15/2023] potassium chloride, 10 mEq, Oral, Daily  rivaroxaban, 15 mg, Oral, QPM    potassium chloride (K-DUR,KLOR-CON) CR tablet 20 mEq  Dose: 20 mEq  Freq: Daily Route: PO  Indications Comment: takes with applesauce  Start: 09/13/23 1130 End: 09/14/23 0919  Continuous IV Infusions:     PRN Meds:  acetaminophen, 650 mg, Oral, Q6H PRN x1 9/13  Diclofenac Sodium, 2 g, Topical, Q6H PRN  SUMAtriptan, 6 mg, Subcutaneous, Q1H PRN    cardiac diet   ICD pacer interrogation  Daily wt   I/O    IP CONSULT TO NEPHROLOGY  IP CONSULT TO CARDIOLOGY    Network Utilization Review Department  ATTENTION: Please call with any questions or concerns to [phone] and carefully listen to the prompts so that you are directed to the right person. All voicemails are confidential.  Steve Sánchez all requests for admission clinical reviews, approved or denied determinations and any other requests to dedicated fax number below belonging to the campus where the patient is receiving treatment.  List of dedicated fax numbers for the Facilities:  [place] DENIALS (Administrative/Medical Necessity) [phone] [address] [facility] (Maternity/NICU/Pediatrics) [phone]   [address] [facility] Kayladaniela Jerezsarah [phone]   [address] [address] [facility] [address] [place] [address] [phone]

## 2023-09-14 NOTE — ASSESSMENT & PLAN NOTE
2/2 CHF exacerbation. Plan  Continue Lasix 50 mg TID. Consider transitioning to Bumex if ineffective.

## 2023-09-14 NOTE — PROGRESS NOTES
NEPHROLOGY PROGRESS NOTE   Elena Montiel 62 y.o. female MRN: [id number]  Unit/Bed#: S -01 Encounter: [id number]  Reason for Consult: Fluid overload    ASSESSMENT AND PLAN:  61 yo woman with PMH of hypertension, hyperlipidemia, CKD G4 (baseline creatinine 1.8 to 2.2 mg/dL) p/w shortness of breath. Nephrology is consulted for management of advanced kidney disease    PLAN:    #CKD G4   • Baseline creatinine:   1.8 to 2.2 mg/dL  • Current creatinine: 2.38 mg/dL, baseline  • Etiology: Likely secondary to nephrosclerosis in the settings of cardiovascular disease  • UA: No hematuria, no leukocyturia  • Plan:  • Maintain mean arterial pressure above 65 mmHg to avoid hypotension/hypoperfusion  • Avoid nephrotoxic agents such as NSAIDs and contrast if at all possible    • Avoid opioids   • Adjust medications to GFR  • Renal diet   • Please suggest Reglan to eGFR    #Volume/hypertension:  • Volume: Hypervolemia  • Blood pressure: Normotensive, /77mmhg, goal<140/90  • Low sodium diet   • Cardura 2 mg daily  • Continue Lasix 50 mg 3 times a day  • Metoprolol 50 mg daily    #Electrolytes  · Decrease potassium chloride to 10 mg once daily    #Acid base disorder  • serum HCO3 29 mmol/L  • Metabolic alkalosis likely secondary to vascular contraction in the settings of diuretics       #CKD-MBD  · Calcium 8.7mg/dL  • At goal     #Secondary Hyperparathyroidism   • iPTH 47.9 guidelines levels KDIGO 2017  • At goal     #Anemia:  · Current hemoglobin:10.8mg/dL  · Likely secondary to CKD   · no indication of OANH at this time    #Obesity   • BMI 38.51  • Recommend weight loss , heathy diet  • Lifestyle modification        SUBJECTIVE:  Patient seen and examined at bedside. No chest pain, shortness of breath, nausea, vomiting, abdominal pain or diarrhea.      OBJECTIVE:  Current Weight: Weight - Scale: 112 kg (245 lb 14.4 oz)  Vitals:    09/14/23 0706   BP: 128/77   Pulse: 60   Resp: 18   Temp: 97.5 °F (36.4 °C)   SpO2: 94% Intake/Output Summary (Last 24 hours) at 9/14/2023 5346  Last data filed at 9/14/2023 6925  Gross per 24 hour   Intake 657 ml   Output 800 ml   Net -143 ml     Wt Readings from Last 3 Encounters:   09/14/23 112 kg (245 lb 14.4 oz)   09/09/23 108 kg (238 lb 1.6 oz)   08/31/23 109 kg (240 lb 9.6 oz)     Temp Readings from Last 3 Encounters:   09/14/23 97.5 °F (36.4 °C)   09/09/23 97.8 °F (36.6 °C)   08/24/23 97.5 °F (36.4 °C) (Oral)     BP Readings from Last 3 Encounters:   09/14/23 128/77   09/09/23 107/68   08/31/23 132/76     Pulse Readings from Last 3 Encounters:   09/14/23 60   09/09/23 59   08/31/23 68      General:  no acute distress at this time  Skin:  No acute rash  Eyes:  No scleral icterus and noninjected  ENT:  mucous membranes moist  Neck:  no carotid bruits  Chest:  Fine crackles, good respiratory effort, no use of accessory respiratory muscles  CVS:  Regular rate and rhythm without rub   Abdomen:  soft and nontender   Extremities: no significant lower extremity edema  Neuro:  No gross focality  Psych:  Alert , cooperative       Medications:    Current Facility-Administered Medications:   •  acetaminophen (TYLENOL) tablet 650 mg, 650 mg, Oral, Q6H PRN, Tevin Grossman MD, 650 mg at 09/13/23 2157  •  Diclofenac Sodium (VOLTAREN) 1 % topical gel 2 g, 2 g, Topical, Q6H PRN, Hui Hester MD  •  diphenhydrAMINE (BENADRYL) injection 25 mg, 25 mg, Intravenous, Q8H [facility], Hui Hester MD, 25 mg at 09/14/23 3718  •  doxazosin (CARDURA) tablet 2 mg, 2 mg, Oral, HS, Hui Hester MD  •  ferrous gluconate (FERGON) tablet 162 mg, 162 mg, Oral, Once per day on Mon Wed Fri, Hui Hester MD, 162 mg at 09/13/23 1221  •  furosemide (LASIX) injection 50 mg, 50 mg, Intravenous, TID (diuretic), Hui Hester MD, 50 mg at 09/14/23 0607  •  magnesium sulfate 2 g/50 mL IVPB (premix) 2 g, 2 g, Intravenous, Q24H [facility], Hui Hester MD, Last Rate: 50 mL/hr at 09/14/23 0807, 2 g at 09/14/23 0807  •  metoclopramide (REGLAN) injection 10 mg, 10 mg, Intravenous, Q8H [address], Galindo Mcwillimas MD, 10 mg at 09/14/23 6541  •  metoprolol succinate (TOPROL-XL) 24 hr tablet 50 mg, 50 mg, Oral, Daily, Galindo Mcwilliams MD, 50 mg at 09/14/23 0804  •  nicotine (NICODERM CQ) 7 mg/24hr TD 24 hr patch 1 patch, 1 patch, Transdermal, Daily, Galindo Mcwilliams MD, 1 patch at 09/14/23 0807  •  nystatin (MYCOSTATIN) powder, , Topical, BID, Muriel Beverage, DO, Given at 09/14/23 0808  •  pantoprazole (PROTONIX) EC tablet 40 mg, 40 mg, Oral, Daily, Galindo Mcwilliams MD, 40 mg at 09/14/23 0804  •  [START ON 9/15/2023] potassium chloride (K-DUR,KLOR-CON) CR tablet 10 mEq, 10 mEq, Oral, Daily, Mahsa Luis MD  •  rivaroxaban (XARELTO) tablet 15 mg, 15 mg, Oral, QPM, Galindo Mcwilliams MD, 15 mg at 09/13/23 1730  •  SUMAtriptan (IMITREX) subcutaneous injection 6 mg, 6 mg, Subcutaneous, Q1H PRN, Galindo Mcwilliams MD    Laboratory Results:  Results from last 7 days   Lab Units 09/14/23  0451 09/13/23  0655 09/09/23  0551 09/08/23  1230   WBC Thousand/uL 3.58* 4.35  --  7.10   HEMOGLOBIN g/dL 10.8* 11.8  --  12.5   HEMATOCRIT % 36.8 38.5  --  39.2   PLATELETS Thousands/uL 110* 131*  --  164   SODIUM mmol/L 140 140 137 136   POTASSIUM mmol/L 4.5 4.7 3.4* 2.8*   CHLORIDE mmol/L 104 107 97 94*   CO2 mmol/L 29 27 32 31   BUN mg/dL 27* 28* 49* 47*   CREATININE mg/dL 2.38* 1.97* 2.96* 3.15*   CALCIUM mg/dL 8.7 9.2 9.0 9.4   MAGNESIUM mg/dL  --   --  2.3  --        XR chest 1 view portable   ED Interpretation by Luis F Hernandez MD (09/13 0474)   This was independently interpreted by me. Lung fields remarkable for pulmonary congestion. Final Result by Low Zhou MD (09/13 0604)      Interval development of mild pulmonary vascular congestion               Workstation performed: [id number]             Portions of the record may have been created with voice recognition software.  Occasional wrong word or "sound a like" substitutions may have occurred due to the inherent limitations of voice recognition software. Read the chart carefully and recognize, using context, where substitutions have occurred.

## 2023-09-14 NOTE — UTILIZATION REVIEW
NOTIFICATION OF INPATIENT ADMISSION   AUTHORIZATION REQUEST   SERVICING FACILITY:   [address] [place] [address]  Tax ID: [id number]  NPI: [id number]   ATTENDING PROVIDER:  Attending Name and NPI#: Ahmet Lieberman Md [[id number]]  Address: [address] [place] [address]  Phone: [phone]     ADMISSION INFORMATION:  Place of Service: Inpatient [address] [facility]  Place of Service Code: 21  Inpatient Admission Date/Time: 9/13/23  8:32 AM  Discharge Date/Time: No discharge date for patient encounter. Admitting Diagnosis Code/Description:  Shortness of breath [R06.02]  SOB (shortness of breath) [R06.02]  CHF exacerbation ([address]) [I50.9]     UTILIZATION REVIEW CONTACT:  Fredy Dimas, Utilization   Network Utilization Review Department  Phone: [phone]  Fax: [phone]  Email: [email or web address]  Contact for approvals/pending authorizations, clinical reviews, and discharge. PHYSICIAN ADVISORY SERVICES:  Medical Necessity Denial & Fgcz-ap-Qohc Review  Phone: [phone]  Fax: [phone]  Email: [email or web address]

## 2023-09-14 NOTE — ASSESSMENT & PLAN NOTE
Wt Readings from Last 3 Encounters:   09/14/23 112 kg (245 lb 14.4 oz)   09/09/23 108 kg (238 lb 1.6 oz)   08/31/23 109 kg (240 lb 9.6 oz)     14 lb weight gain in 4 days. BNP of 1060. Last echo in august. Preserved EF of 70%. Possible renal etiology. Plan:   -Continue Tele  -Cardiology and Nephro consult, appreciate recs  -IV Lasix 50 mg TID is working well, continue for now. Consider IV Bumex if no further improvement.    -Monitor I/Os, weights closely  -discharge on diuretics prescription

## 2023-09-14 NOTE — ASSESSMENT & PLAN NOTE
SBP. Improved.   Vitals:    09/14/23 1211   BP: 123/77   Pulse:    Resp:    Temp: 97.7 °F (36.5 °C)   SpO2:          Plan:   BP improved  Continue to monitor

## 2023-09-15 ENCOUNTER — TELEPHONE (OUTPATIENT)
Dept: NEPHROLOGY | Facility: CLINIC | Age: 58
End: 2023-09-15

## 2023-09-15 DIAGNOSIS — N18.32 STAGE 3B CHRONIC KIDNEY DISEASE (HCC): Primary | ICD-10-CM

## 2023-09-15 PROBLEM — I5A NONISCHEMIC NONTRAUMATIC MYOCARDIAL INJURY: Status: ACTIVE | Noted: 2019-09-09

## 2023-09-15 PROBLEM — I50.33 ACUTE ON CHRONIC HEART FAILURE WITH PRESERVED EJECTION FRACTION (HCC): Status: ACTIVE | Noted: 2022-10-13

## 2023-09-15 LAB
ANION GAP SERPL CALCULATED.3IONS-SCNC: 6 MMOL/L
BASOPHILS # BLD AUTO: 0.01 THOUSANDS/ÂΜL (ref 0–0.1)
BASOPHILS NFR BLD AUTO: 0 % (ref 0–1)
BUN SERPL-MCNC: 28 MG/DL (ref 5–25)
CALCIUM SERPL-MCNC: 8.8 MG/DL (ref 8.4–10.2)
CHLORIDE SERPL-SCNC: 103 MMOL/L (ref 96–108)
CO2 SERPL-SCNC: 30 MMOL/L (ref 21–32)
CREAT SERPL-MCNC: 2.33 MG/DL (ref 0.6–1.3)
EOSINOPHIL # BLD AUTO: 0.14 THOUSAND/ÂΜL (ref 0–0.61)
EOSINOPHIL NFR BLD AUTO: 4 % (ref 0–6)
ERYTHROCYTE [DISTWIDTH] IN BLOOD BY AUTOMATED COUNT: 16 % (ref 11.6–15.1)
GFR SERPL CREATININE-BSD FRML MDRD: 22 ML/MIN/1.73SQ M
GLUCOSE SERPL-MCNC: 79 MG/DL (ref 65–140)
HCT VFR BLD AUTO: 36.1 % (ref 34.8–46.1)
HGB BLD-MCNC: 11.2 G/DL (ref 11.5–15.4)
IMM GRANULOCYTES # BLD AUTO: 0.01 THOUSAND/UL (ref 0–0.2)
IMM GRANULOCYTES NFR BLD AUTO: 0 % (ref 0–2)
LYMPHOCYTES # BLD AUTO: 0.9 THOUSANDS/ÂΜL (ref 0.6–4.47)
LYMPHOCYTES NFR BLD AUTO: 25 % (ref 14–44)
MCH RBC QN AUTO: 26.5 PG (ref 26.8–34.3)
MCHC RBC AUTO-ENTMCNC: 31 G/DL (ref 31.4–37.4)
MCV RBC AUTO: 86 FL (ref 82–98)
MONOCYTES # BLD AUTO: 0.26 THOUSAND/ÂΜL (ref 0.17–1.22)
MONOCYTES NFR BLD AUTO: 7 % (ref 4–12)
NEUTROPHILS # BLD AUTO: 2.27 THOUSANDS/ÂΜL (ref 1.85–7.62)
NEUTS SEG NFR BLD AUTO: 64 % (ref 43–75)
NRBC BLD AUTO-RTO: 0 /100 WBCS
PLATELET # BLD AUTO: 123 THOUSANDS/UL (ref 149–390)
PMV BLD AUTO: 12.5 FL (ref 8.9–12.7)
POTASSIUM SERPL-SCNC: 4.2 MMOL/L (ref 3.5–5.3)
RBC # BLD AUTO: 4.22 MILLION/UL (ref 3.81–5.12)
SODIUM SERPL-SCNC: 139 MMOL/L (ref 135–147)
WBC # BLD AUTO: 3.59 THOUSAND/UL (ref 4.31–10.16)

## 2023-09-15 PROCEDURE — 80048 BASIC METABOLIC PNL TOTAL CA: CPT

## 2023-09-15 PROCEDURE — 99232 SBSQ HOSP IP/OBS MODERATE 35: CPT | Performed by: INTERNAL MEDICINE

## 2023-09-15 PROCEDURE — 85025 COMPLETE CBC W/AUTO DIFF WBC: CPT

## 2023-09-15 RX ORDER — TORSEMIDE 20 MG/1
40 TABLET ORAL 2 TIMES DAILY
Status: DISCONTINUED | OUTPATIENT
Start: 2023-09-15 | End: 2023-09-16 | Stop reason: HOSPADM

## 2023-09-15 RX ADMIN — PANTOPRAZOLE SODIUM 40 MG: 40 TABLET, DELAYED RELEASE ORAL at 09:01

## 2023-09-15 RX ADMIN — DIPHENHYDRAMINE HYDROCHLORIDE 25 MG: 50 INJECTION, SOLUTION INTRAMUSCULAR; INTRAVENOUS at 14:47

## 2023-09-15 RX ADMIN — SUMATRIPTAN 6 MG: 6 INJECTION, SOLUTION SUBCUTANEOUS at 22:46

## 2023-09-15 RX ADMIN — FERROUS GLUCONATE 162 MG: 324 TABLET ORAL at 09:03

## 2023-09-15 RX ADMIN — METOCLOPRAMIDE 10 MG: 5 INJECTION, SOLUTION INTRAMUSCULAR; INTRAVENOUS at 14:47

## 2023-09-15 RX ADMIN — RIVAROXABAN 15 MG: 15 TABLET, FILM COATED ORAL at 17:26

## 2023-09-15 RX ADMIN — DIPHENHYDRAMINE HYDROCHLORIDE 25 MG: 50 INJECTION, SOLUTION INTRAMUSCULAR; INTRAVENOUS at 07:33

## 2023-09-15 RX ADMIN — TORSEMIDE 40 MG: 20 TABLET ORAL at 17:26

## 2023-09-15 RX ADMIN — DIPHENHYDRAMINE HYDROCHLORIDE 25 MG: 50 INJECTION, SOLUTION INTRAMUSCULAR; INTRAVENOUS at 22:46

## 2023-09-15 RX ADMIN — NYSTATIN 1 APPLICATION: 100000 POWDER TOPICAL at 17:27

## 2023-09-15 RX ADMIN — DOXAZOSIN 2 MG: 1 TABLET ORAL at 22:43

## 2023-09-15 RX ADMIN — METOPROLOL SUCCINATE 50 MG: 50 TABLET, EXTENDED RELEASE ORAL at 09:02

## 2023-09-15 RX ADMIN — METOCLOPRAMIDE 10 MG: 5 INJECTION, SOLUTION INTRAMUSCULAR; INTRAVENOUS at 07:33

## 2023-09-15 RX ADMIN — METOCLOPRAMIDE 10 MG: 5 INJECTION, SOLUTION INTRAMUSCULAR; INTRAVENOUS at 22:46

## 2023-09-15 RX ADMIN — TORSEMIDE 40 MG: 20 TABLET ORAL at 09:02

## 2023-09-15 RX ADMIN — POTASSIUM CHLORIDE 10 MEQ: 750 TABLET, EXTENDED RELEASE ORAL at 09:02

## 2023-09-15 RX ADMIN — NYSTATIN: 100000 POWDER TOPICAL at 09:02

## 2023-09-15 RX ADMIN — NICOTINE 1 PATCH: 7 PATCH, EXTENDED RELEASE TRANSDERMAL at 09:02

## 2023-09-15 NOTE — PROGRESS NOTES
[address] [facility]  Progress Note  Name: Jose Akers  MRN: [id number]  Unit/Bed#: S -09 I Date of Admission: 9/13/2023   Date of Service: 9/15/2023 I Hospital Day: 2    Assessment/Plan   * Acute on chronic heart failure with preserved ejection fraction [facility])  Assessment & Plan  Wt Readings from Last 3 Encounters:   09/15/23 111 kg (245 lb 3.2 oz)   09/09/23 108 kg (238 lb 1.6 oz)   08/31/23 109 kg (240 lb 9.6 oz)     14 lb weight gain in 4 days. BNP of 1060. Last echo in august. Preserved EF of 70%. Patient had 7 lb weight loss from diuresis between Day 1 and day 2, but did not have significant diuresis (0 lbs) from day 2 to day 3 of admission. Plan:   -Continue Tele  -Cardiology and Nephro consult, appreciate recs  -Creatinine improved from 2.38 yesterday to 2.33 today, baseline is 1.7-2.1.  -Patient transitioned from IV Lasix 50 mg TID to Torsemide 40 mg BID po today by Nephrology with Cardiology in agreement. Patient's home dose was 60 mg daily. -Nephro also advised "metolazone 5 mg 60 minutes prior to the morning dose of torsemide only as needed for 2 to 3 pound weight gain, worsening swelling or shortness of breath"  -Monitor I/Os, weights closely  -discharge on diuretics prescription tomorrow possibly    Facial numbness, resolved  Assessment & Plan  Patient c/o R facial numbness since this morning. Improved now. Most likely due to hypertensive urgency vs neurologic since asymptomatic otherwise. Plan  Continue to monitor as this is currently resolved. Bilateral lower extremity edema  Assessment & Plan  2/2 CHF exacerbation. Plan  Continue diuresis as per nephro's recs: Torsemide 40 mg BID po.    See above plan for CHF exacerbation    Acute kidney injury superimposed on chronic kidney disease Stage 4 due to diuresis  Assessment & Plan  Lab Results   Component Value Date    EGFR 22 09/15/2023    EGFR 21 09/14/2023    EGFR 27 09/13/2023    CREATININE 2.33 (H) 09/15/2023    CREATININE 2.38 (H) 09/14/2023    CREATININE 1.97 (H) 09/13/2023       Plan:  - Nephrology on board  - Patient's creatinine improved to 2.33 today from 2.38 yesterday  - Continue to monitor    Essential hypertension  Assessment & Plan   SBP. Improved. Vitals:    09/15/23 1039   BP: 134/76   Pulse:    Resp: 18   Temp: (!) 97.4 °F (36.3 °C)   SpO2:          Plan:   BP improved  Continue to monitor      Paroxysmal A-fib [facility])  Assessment & Plan  Patient with pacemaker. On metoprolol and xarelto    Plan  Cardio on board, continue metoprolol and renally dosed xarelto, per cardio    Nonischemic nontraumatic myocardial injury due to CHF  Assessment & Plan  Lab Results   Component Value Date    HSTNI0 64 (H) 09/13/2023    HSTNI2 67 (H) 09/13/2023    HSTNID2 3 09/13/2023    HSTNI4 59 (H) 09/13/2023    HSTNID4 -5 09/13/2023     Most likely due to CHF exacerbation, resolved    Headache  Assessment & Plan  Recurrent headaches present in past admissions. Plan  -Migraine cocktail as needed-avoid NSAID with CKD. VTE Pharmacologic Prophylaxis: VTE Score: 3 Moderate Risk (Score 3-4) - Pharmacological DVT Prophylaxis Ordered: rivaroxaban (Xarelto). Patient Centered Rounds: I performed bedside rounds with nursing staff today. Discussions with Specialists or Other Care Team Provider: Cardiology, Nephrology    Education and Discussions with Family / Patient: Patient declined call to . Current Length of Stay: 2 day(s)  Current Patient Status: Inpatient   Discharge Plan: Anticipate discharge tomorrow to home. Code Status: Level 1 - Full Code    Subjective:   Patient seen at bedside today. No acute events overnight. Patient did not have significant diuresis today though the patient says she has been urinating quite frequently. From day 1 to day 2, her weight loss due to diuresis has been about 7 pounds but from day 2 to day 3, her weight loss has been 0 pounds.   Other than this, she continues to report some headaches occasionally, but says they are not significantly bothersome. Otherwise, she has no other complaints. Objective:     Vitals:   Temp (24hrs), Av.6 °F (36.4 °C), Min:97.4 °F (36.3 °C), Max:98 °F (36.7 °C)    Temp:  [97.4 °F (36.3 °C)-98 °F (36.7 °C)] 97.4 °F (36.3 °C)  HR:  [59-65] 59  Resp:  [16-18] 18  BP: (107-144)/(52-80) 134/76  SpO2:  [96 %] 96 %  Body mass index is 38.4 kg/m². Input and Output Summary (last 24 hours): Intake/Output Summary (Last 24 hours) at 9/15/2023 1434  Last data filed at 9/15/2023 0753  Gross per 24 hour   Intake 540 ml   Output 1350 ml   Net -810 ml       Physical Exam:   Physical Exam  Vitals and nursing note reviewed. Constitutional:       General: She is not in acute distress. Appearance: Normal appearance. She is obese. She is not ill-appearing, toxic-appearing or diaphoretic. HENT:      Head: Normocephalic and atraumatic. Mouth/Throat:      Mouth: Mucous membranes are moist.   Eyes:      General: No scleral icterus. Pupils: Pupils are equal, round, and reactive to light. Cardiovascular:      Rate and Rhythm: Normal rate and regular rhythm. Pulses: Normal pulses. Heart sounds: No friction rub. No gallop. Comments: JVD present  Pulmonary:      Effort: Pulmonary effort is normal. No respiratory distress. Breath sounds: Wheezing (Expiratory wheezes bilaterally) present. No rhonchi or rales. Abdominal:      General: Bowel sounds are normal. There is no distension. Tenderness: There is no abdominal tenderness. There is no guarding or rebound. Musculoskeletal:         General: No swelling. Normal range of motion. Cervical back: Normal range of motion and neck supple. Right lower leg: No edema. Left lower leg: No edema. Skin:     General: Skin is warm. Coloration: Skin is not jaundiced. Neurological:      General: No focal deficit present.       Mental Status: She is alert and oriented to person, place, and time. Cranial Nerves: No cranial nerve deficit. Psychiatric:         Mood and Affect: Mood normal.         Behavior: Behavior normal.         Thought Content: Thought content normal.         Judgment: Judgment normal.          Additional Data:     Labs:  Results from last 7 days   Lab Units 09/15/23  0438   WBC Thousand/uL 3.59*   HEMOGLOBIN g/dL 11.2*   HEMATOCRIT % 36.1   PLATELETS Thousands/uL 123*   NEUTROS PCT % 64   LYMPHS PCT % 25   MONOS PCT % 7   EOS PCT % 4     Results from last 7 days   Lab Units 09/15/23  0438 09/14/23  0451 09/13/23  0655   SODIUM mmol/L 139   < > 140   POTASSIUM mmol/L 4.2   < > 4.7   CHLORIDE mmol/L 103   < > 107   CO2 mmol/L 30   < > 27   BUN mg/dL 28*   < > 28*   CREATININE mg/dL 2.33*   < > 1.97*   ANION GAP mmol/L 6   < > 6   CALCIUM mg/dL 8.8   < > 9.2   ALBUMIN g/dL  --   --  3.8   TOTAL BILIRUBIN mg/dL  --   --  0.33   ALK PHOS U/L  --   --  71   ALT U/L  --   --  16   AST U/L  --   --  33   GLUCOSE RANDOM mg/dL 79   < > 103    < > = values in this interval not displayed. Lines/Drains:  Invasive Devices     Peripheral Intravenous Line  Duration           Peripheral IV 09/13/23 Right;Ventral (anterior) Forearm 2 days                  Telemetry:  Telemetry Orders (From admission, onward)             24 Hour Telemetry Monitoring  Continuous x 24 Hours (Telem)        Question:  Reason for 24 Hour Telemetry  Answer:  Decompensated CHF- and any one of the following: continuous diuretic infusion or total diuretic dose >200 mg daily, associated electrolyte derangement (I.e. K < 3.0), ionotropic drip (continuous infusion), hx of ventricular arrhythmia, or new EF < 35%                 Telemetry Reviewed: Normal Sinus Rhythm  Indication for Continued Telemetry Use: No indication for continued use. Will discontinue.               Imaging: Reviewed radiology reports from this admission including: chest xray  XR chest 1 view portable   ED Interpretation by Aurelia Toscano MD (09/13 9888)   This was independently interpreted by me. Lung fields remarkable for pulmonary congestion. Final Result by Salomon Livingston MD (09/13 5957)      Interval development of mild pulmonary vascular congestion               Workstation performed: [id number]             Recent Cultures (last 7 days):         Last 24 Hours Medication List:   Current Facility-Administered Medications   Medication Dose Route Frequency Provider Last Rate   • acetaminophen  650 mg Oral Q6H PRN Tevin Grossman MD     • Diclofenac Sodium  2 g Topical Q6H PRN Keesha Simms MD     • diphenhydrAMINE  25 mg Intravenous Q8H Daniel Lepe MD     • doxazosin  2 mg Oral HS Keesha Simms MD     • ferrous gluconate  162 mg Oral Once per day on Mon Wed Fri Keesha Simms MD     • metoclopramide  10 mg Intravenous [facility] Keesha Simms MD     • metoprolol succinate  50 mg Oral Daily Keesha Simms MD     • nicotine  1 patch Transdermal Daily Keesha Simms MD     • nystatin   Topical BID Kelsi Ibarra DO     • pantoprazole  40 mg Oral Daily Keesha Simms MD     • potassium chloride  10 mEq Oral Daily Alberteen Aschoff, MD     • rivaroxaban  15 mg Oral QPM Keesha Simms MD     • SUMAtriptan  6 mg Subcutaneous Q1H PRN Keesha Simms MD     • torsemide  40 mg Oral BID Cami Malik MD          Today, Patient Was Seen By: Joseph Nolan DO    **Please Note: This note may have been constructed using a voice recognition system. **

## 2023-09-15 NOTE — ASSESSMENT & PLAN NOTE
Patient with pacemaker.  On metoprolol and xarelto    Plan  Cardio on board, continue metoprolol and renally dosed xarelto, per cardio  Continue home meds on discharge 5

## 2023-09-15 NOTE — PROGRESS NOTES
Heart Failure/ Pulmonary Hypertension Progress Note - Ashley Haro 62 y.o. female MRN: [id number]    Unit/Bed#: S -01 Encounter: [id number]      Assessment:    Principal Problem:    Chronic heart failure with preserved ejection fraction (HFpEF) ([facility])  Active Problems:    Headache    Elevated troponin    Paroxysmal A-fib (HCC)    Essential hypertension    Bilateral lower extremity edema    Facial numbness    Acute on chronic HFimpEF. Etiology: nonischemic; tachy-mediated in 05/2021 (LVEF 30%) and again in 06/2022 (LVEF 40-45%) in setting of rapid Aflutter.     Weight of 235 lbs on 05/12/23, 242 lbs >> 236 lbs >> 252 lbs 9/13/23; 245 lbs >> 245 lbs today     Diagnostic:              TTE 03/2016: LVEF 60-65%. LHC 01/07/2019: no obstructive CAD. TTE 04/18/2019: LVEF 65%. TTE 09/18/2020: LVEF 65%. TTE 05/25/2021: LVEF 30%. LVIDd 3.7 cm.  Reduced RVSF. Trace MR and TR. TTE 01/31/2022: LVEF 55%. LVIDd 4.8 cm. Grade 2 DD. Mildly reduced RVSF. TTE (limited) 06/19/2022: LVEF 40-45%. LVIDd 4.1 cm.   TTE (limited) 07/28/2022: LVEF 55%. Normal RV. TTE 04/05/2023: LVEF 65%. LVIDd 3.6 cm. Normal RV. Mild TR.   TTE (limited) 05/12/2023: LVEF 65%. Normal RV. TTE 8/9/23:                 Pharmacotherapies / Neurohormonal Blockade:   --Beta Blocker: Metoprolol succinate 50 mg daily  --ARNi / ACEi / ARB: No, CKD precludes.    --Aldosterone Antagonist: No, CKD precludes.   --SGLT2 Inhibitor:   -- Home Diuretic: torsemide 40mg in AM and 20mg in PM with potassium 20 meq daily  --Inpatient diuretic: furosemide 50mg IV TID     Sudden Cardiac Death Risk Reduction:  --[employer]tronic dual chamber ICD in situ since 07/2016. --Interrogation from 6/19/2023: AP 0%.  8%.  AT/AF burden 41% since 5/10/2023. Lower rate decreased from 80 to 70 bpm due to AV ablation.   OptiVol normal.     Electrical Resynchronization:  --Candidacy for BiV device: RBBB with  ms.      # H/O hypertensive urgency. currently normotensive  History of atrial flutter / atrial fibrillation               [id number] = 3 (sex, HF, HTN).             Anticoagulation on Xarelto.               S/p unspecified ablation at [facility] in 2015.              S/p Afib ablation with PVI in 05/2020.              S/p atypical flutter and micro-reentrant atrial tachycardia ablation in            06/2022.              S/p AV node ablation with ICD reprogramming on 05/10/2023.              Rate control: metoprolol succinate 50 mg daily.             Rhythm control: None.   # Chronic kidney disease, stage IV, recent creatinine 2-3, 2.38 >> 2.33 today  # History of monomorphic ventricular tachycardia: noted on outpatient loop recorder; s/p secondary prevention ICD in 07/2016. # Obstructive sleep apnea  # Tobacco abuse  # History of cocaine use: last used in 03/2023. # Marijuana use:  occasionally     Today's Plan:  Symptomatically better, weight still above reported dry weight of 235 lbs  Transitioned to oral diuretic per nephrology  Strict I/O and daily weights      Subjective:   Patient seen and examined. No significant events overnight. Shortness of breath better. Reports good urination but even I's and O's on documentation and weight unchanged. Review of Systems   Constitutional: Negative for chills and fever. Respiratory: Negative for shortness of breath. Cardiovascular: Negative for palpitations and leg swelling. Gastrointestinal: Negative for abdominal distention, abdominal pain, nausea and vomiting. Neurological: Negative for dizziness and light-headedness. Objective: Intake/ Output:  1119/1050  Weight: 245 lbs    Vitals: Blood pressure 128/68, pulse 59, temperature (!) 97.4 °F (36.3 °C), resp. rate 18, weight 111 kg (245 lb 3.2 oz), SpO2 96 %, not currently breastfeeding., Body mass index is 38.4 kg/m²., I/O last 3 completed shifts:   In: 9479 [P.O.:1119]  Out: 1850 [OZJJW:8272]  I/O this shift:  In: 300 [P.O.:300]  Out: 600 [Urine:600]  Wt Readings from Last 3 Encounters:   09/15/23 111 kg (245 lb 3.2 oz)   09/09/23 108 kg (238 lb 1.6 oz)   08/31/23 109 kg (240 lb 9.6 oz)       Intake/Output Summary (Last 24 hours) at 9/15/2023 1006  Last data filed at 9/15/2023 0753  Gross per 24 hour   Intake 762 ml   Output 1650 ml   Net -888 ml     I/O last 3 completed shifts:   In: 9607 [P.O.:1119]  Out: 1850 [Urine:1850]        Physical Exam:  Vitals:    09/15/23 0600 09/15/23 0623 09/15/23 0751 09/15/23 0849   BP:  122/52  128/68   Pulse:       Resp:  18     Temp:  (!) 97.4 °F (36.3 °C)     TempSrc:       SpO2:       Weight: 110 kg (243 lb 9.7 oz)  111 kg (245 lb 3.2 oz)        GEN: James Whitaker appears well, alert and oriented x 3, pleasant and cooperative   HEENT: NC/AT, moist mucosa, anicteric sclerae; extraocular muscles intact  NECK: supple, no carotid bruits   HEART: regular rhythm, normal S1 and S2, no murmurs, clicks, gallops or rubs, JVP is elevated  LUNGS: clear to auscultation bilaterally; no wheezes, rales, or rhonchi   ABDOMEN: normal bowel sounds, soft, no tenderness, no distention  EXTREMITIES: peripheral pulses normal; no clubbing, cyanosis, or edema  NEURO: no focal findings   SKIN: normal without suspicious lesions on exposed skin      Current Facility-Administered Medications:   •  acetaminophen (TYLENOL) tablet 650 mg, 650 mg, Oral, Q6H PRN, Tevin Grossman MD, 650 mg at 09/14/23 1141  •  Diclofenac Sodium (VOLTAREN) 1 % topical gel 2 g, 2 g, Topical, Q6H PRN, Brian Montiel MD  •  diphenhydrAMINE (BENADRYL) injection 25 mg, 25 mg, Intravenous, Q8H [address], Brian Montiel MD, 25 mg at 09/15/23 1819  •  doxazosin (CARDURA) tablet 2 mg, 2 mg, Oral, HS, Brian Montiel MD, 2 mg at 09/14/23 2058  •  ferrous gluconate (FERGON) tablet 162 mg, 162 mg, Oral, Once per day on Mon Wed Fri, Brian Montiel MD, 162 mg at 09/15/23 3874  •  metoclopramide (REGLAN) injection 10 mg, 10 mg, Intravenous, Q8H [address], Brian Montiel MD, 10 mg at 09/15/23 6757  •  metoprolol succinate (TOPROL-XL) 24 hr tablet 50 mg, 50 mg, Oral, Daily, Trudy Diaz MD, 50 mg at 09/15/23 0902  •  nicotine (NICODERM CQ) 7 mg/24hr TD 24 hr patch 1 patch, 1 patch, Transdermal, Daily, Trudy Diaz MD, 1 patch at 09/15/23 0902  •  nystatin (MYCOSTATIN) powder, , Topical, BID, Marilyn Perkins DO, Given at 09/15/23 0902  •  pantoprazole (PROTONIX) EC tablet 40 mg, 40 mg, Oral, Daily, Trudy Diaz MD, 40 mg at 09/15/23 0901  •  potassium chloride (K-DUR,KLOR-CON) CR tablet 10 mEq, 10 mEq, Oral, Daily, Kit Mac MD, 10 mEq at 09/15/23 0024  •  rivaroxaban (XARELTO) tablet 15 mg, 15 mg, Oral, QPM, Trudy Diaz MD, 15 mg at 09/14/23 1719  •  SUMAtriptan (IMITREX) subcutaneous injection 6 mg, 6 mg, Subcutaneous, Q1H PRN, Trudy Diaz MD  •  torsemide [facility]) tablet 40 mg, 40 mg, Oral, BID, Diane Bautista MD, 40 mg at 09/15/23 0902      Labs & Results:        Results from last 7 days   Lab Units 09/15/23  0438 09/14/23  0451 09/13/23  0655   WBC Thousand/uL 3.59* 3.58* 4.35   HEMOGLOBIN g/dL 11.2* 10.8* 11.8   HEMATOCRIT % 36.1 36.8 38.5   PLATELETS Thousands/uL 123* 110* 131*         Results from last 7 days   Lab Units 09/15/23  0438 09/14/23  0451 09/13/23  0655 09/09/23  0551 09/08/23  1230   POTASSIUM mmol/L 4.2 4.5 4.7   < > 2.8*   CHLORIDE mmol/L 103 104 107   < > 94*   CO2 mmol/L 30 29 27   < > 31   BUN mg/dL 28* 27* 28*   < > 47*   CREATININE mg/dL 2.33* 2.38* 1.97*   < > 3.15*   CALCIUM mg/dL 8.8 8.7 9.2   < > 9.4   ALK PHOS U/L  --   --  71  --  77   ALT U/L  --   --  16  --  17   AST U/L  --   --  33  --  33    < > = values in this interval not displayed.            Briana Alanis MD  Advanced Heart Failure and Mechanical Circulatory [facility]

## 2023-09-15 NOTE — ASSESSMENT & PLAN NOTE
Recurrent headaches present in past admissions. Plan  -Migraine cocktail as needed-avoid NSAID with CKD.    Can follow with PCP

## 2023-09-15 NOTE — PROGRESS NOTES
Progress Note - Nephrology   Jeffrey Alfie 62 y.o. female MRN: [id number]  Unit/Bed#: S -01 Encounter: [id number]    ASSESSMENT AND PLAN:  63 yo woman with PMH of hypertension, hyperlipidemia, CKD G4 (baseline creatinine 1.8 to 2.2 mg/dL) p/w shortness of breath. Nephrology is consulted for management of advanced kidney disease     1. CKD 4:  - Baseline creatinine 1.8-2.2  - Follows with Dr. Rausch All  - Etiology: Hypertensive nephrosclerosis/cardiorenal syndrome/arteriolar nephrosclerosis    Current creatinine: 2.33 at baseline    Recommendations  - Switch over to torsemide 40 mg twice a day to begin with she was on 60 mg as an outpatient  - Can use as needed metolazone 5 mg 60 minutes prior to the morning torsemide dose only as needed for 2 to 3 pound weight gain or worsening shortness of breath  - Follow labs in 1 to 2 weeks postdischarge  - Daily weights of course  - Low-sodium diet    2. Volume/blood pressure:  - Volume: Volume overloaded now appears euvolemic please see above discussion  - Blood pressure: Doing well; I will place hold parameters on any blood pressure medications    3. Electrolytes/acid-base all acceptable    4. CKD-MBD doing well, PTH intact level at goal    5. Anemia:  - Current hemoglobin acceptable at 10.8 most compatible with CKD no OANH at this time will be followed    6. Obesity: Weight loss healthy diet recommended    Patient potentially can be discharged from my perspective on the torsemide 40 twice daily as mentioned I will arrange outpatient follow-up  Thank you    Subjective:   Patient complains of a bit of a headache, also cough with mild yellow sputum  No chest pain or shortness of breath  No leg swelling and overall feels markedly better regarding breathing  No urinary symptoms  No nausea vomiting or diarrhea    Objective:     Vitals: Blood pressure 122/52, pulse 59, temperature (!) 97.4 °F (36.3 °C), resp.  rate 18, weight 111 kg (245 lb 3.2 oz), SpO2 96 %, not currently breastfeeding. ,Body mass index is 38.4 kg/m².     Weight (last 2 days)     Date/Time Weight    09/15/23 0751 111 (245.2)    09/15/23 0600 110 (243.61)    09/14/23 0549 112 (245.9)    09/13/23 0548 115 (252.65)            Intake/Output Summary (Last 24 hours) at 9/15/2023 0828  Last data filed at 9/15/2023 0753  Gross per 24 hour   Intake 762 ml   Output 1650 ml   Net -888 ml            Physical Exam: General:  No acute distress/obese  Skin:  No acute rash  Eyes:  No scleral icterus and noninjected  ENT:  Moist mucous membranes  Neck:  Supple, no jugular venous distention, trachea midline, overall appearance is normal  Chest:  Clear to auscultation  CVS:  Regular rate and rhythm, without a rub or gallops; grade 1/6 systolic ejection type murmur heard best throughout the precordium  Abdomen:  Normal bowel sounds, soft and nontender and nondistended  Extremities:  No edema, and no cyanosis, no significant arthritic changes  Neuro:  No gross focality  Psych:  Alert and oriented and appropriate                Medications:    Scheduled Meds:  Current Facility-Administered Medications   Medication Dose Route Frequency Provider Last Rate   • acetaminophen  650 mg Oral Q6H PRN Tevin Grossman MD     • Diclofenac Sodium  2 g Topical Q6H PRN Valeria Mackey MD     • diphenhydrAMINE  25 mg Intravenous Q8H Carito Carlos MD     • doxazosin  2 mg Oral HS Valeria Mackey MD     • ferrous gluconate  162 mg Oral Once per day on Mon Wed Fri Valeria Mackey MD     • furosemide  50 mg Intravenous TID (diuretic) Valeria Mackey MD     • metoclopramide  10 mg Intravenous [facility] Valeria Mackey MD     • metoprolol succinate  50 mg Oral Daily Valeria Mackey MD     • nicotine  1 patch Transdermal Daily Valeria Mackey MD     • nystatin   Topical BID Deward Drain, DO     • pantoprazole  40 mg Oral Daily Valeria Mackey MD     • potassium chloride  10 mEq Oral Daily Jaycob Robertson MD     • rivaroxaban  15 mg Oral QPM Valeria Mackey MD     • SUMAtriptan  6 mg Subcutaneous Q1H PRN Jean-Claude Tony MD         PRN Meds:.•  acetaminophen  •  Diclofenac Sodium  •  SUMAtriptan    Continuous Infusions:     Lab, Imaging and other studies: I have personally reviewed pertinent labs. Laboratory Results:  Results from last 7 days   Lab Units 09/15/23  0438 09/14/23  0451 09/13/23  0655 09/09/23  0551 09/08/23  1230   WBC Thousand/uL 3.59* 3.58* 4.35  --  7.10   HEMOGLOBIN g/dL 11.2* 10.8* 11.8  --  12.5   HEMATOCRIT % 36.1 36.8 38.5  --  39.2   PLATELETS Thousands/uL 123* 110* 131*  --  164   POTASSIUM mmol/L 4.2 4.5 4.7 3.4* 2.8*   CHLORIDE mmol/L 103 104 107 97 94*   CO2 mmol/L 30 29 27 32 31   BUN mg/dL 28* 27* 28* 49* 47*   CREATININE mg/dL 2.33* 2.38* 1.97* 2.96* 3.15*   CALCIUM mg/dL 8.8 8.7 9.2 9.0 9.4   MAGNESIUM mg/dL  --   --   --  2.3  --      Urinalysis:   Lab Results   Component Value Date    COLORU Light Yellow 08/16/2023    CLARITYU Clear 08/16/2023    SPECGRAV 1.010 08/16/2023    PHUR 5.5 08/16/2023    PHUR 7.0 01/30/2022    LEUKOCYTESUR Trace (A) 08/16/2023    NITRITE Negative 08/16/2023    GLUCOSEU Negative 08/16/2023    KETONESU Negative 08/16/2023    BILIRUBINUR Negative 08/16/2023    BLOODU Negative 08/16/2023     ABGs: No results found for: "PH"  Radiology review:     Portions of the record may have been created with voice recognition software. Occasional wrong word or "sound a like" substitutions may have occurred due to the inherent limitations of voice recognition software. Read the chart carefully and recognize, using context, where substitutions have occurred.

## 2023-09-15 NOTE — PLAN OF CARE
Problem: CARDIOVASCULAR - ADULT  Goal: Maintains optimal cardiac output and hemodynamic stability  Description: INTERVENTIONS:  - Monitor I/O, vital signs and rhythm  - Monitor for S/S and trends of decreased cardiac output  - Administer and titrate ordered vasoactive medications to optimize hemodynamic stability  - Assess quality of pulses, skin color and temperature  - Assess for signs of decreased coronary artery perfusion  - Instruct patient to report change in severity of symptoms  Outcome: Progressing  Goal: Absence of cardiac dysrhythmias or at baseline rhythm  Description: INTERVENTIONS:  - Continuous cardiac monitoring, vital signs, obtain 12 lead EKG if ordered  - Administer antiarrhythmic and heart rate control medications as ordered  - Monitor electrolytes and administer replacement therapy as ordered  Outcome: Progressing     Problem: RESPIRATORY - ADULT  Goal: Achieves optimal ventilation and oxygenation  Description: INTERVENTIONS:  - Assess for changes in respiratory status  - Assess for changes in mentation and behavior  - Position to facilitate oxygenation and minimize respiratory effort  - Oxygen administered by appropriate delivery if ordered  - Initiate smoking cessation education as indicated  - Encourage broncho-pulmonary hygiene including cough, deep breathe, Incentive Spirometry  - Assess the need for suctioning and aspirate as needed  - Assess and instruct to report SOB or any respiratory difficulty  - Respiratory Therapy support as indicated  Outcome: Progressing     Problem: MOBILITY - ADULT  Goal: Maintain or return to baseline ADL function  Description: INTERVENTIONS:  -  Assess patient's ability to carry out ADLs; assess patient's baseline for ADL function and identify physical deficits which impact ability to perform ADLs (bathing, care of mouth/teeth, toileting, grooming, dressing, etc.)  - Assess/evaluate cause of self-care deficits   - Assess range of motion  - Assess patient's mobility; develop plan if impaired  - Assess patient's need for assistive devices and provide as appropriate  - Encourage maximum independence but intervene and supervise when necessary  - Involve family in performance of ADLs  - Assess for home care needs following discharge   - Consider OT consult to assist with ADL evaluation and planning for discharge  - Provide patient education as appropriate  Outcome: Progressing

## 2023-09-15 NOTE — ASSESSMENT & PLAN NOTE
SBP. Improved.   Vitals:    09/15/23 1039   BP: 134/76   Pulse:    Resp: 18   Temp: (!) 97.4 °F (36.3 °C)   SpO2:          Plan:   BP improved  Continue to monitor

## 2023-09-15 NOTE — PLAN OF CARE
Problem: MOBILITY - ADULT  Goal: Maintain or return to baseline ADL function  Description: INTERVENTIONS:  -  Assess patient's ability to carry out ADLs; assess patient's baseline for ADL function and identify physical deficits which impact ability to perform ADLs (bathing, care of mouth/teeth, toileting, grooming, dressing, etc.)  - Assess/evaluate cause of self-care deficits   - Assess range of motion  - Assess patient's mobility; develop plan if impaired  - Assess patient's need for assistive devices and provide as appropriate  - Encourage maximum independence but intervene and supervise when necessary  - Involve family in performance of ADLs  - Assess for home care needs following discharge   - Consider OT consult to assist with ADL evaluation and planning for discharge  - Provide patient education as appropriate  9/15/2023 1133 by Monico Guardado RN  Outcome: Progressing  9/15/2023 1125 by Monico Guardado RN  Outcome: Progressing

## 2023-09-15 NOTE — ASSESSMENT & PLAN NOTE
Recurrent headaches present in past admissions. Plan  -Migraine cocktail as needed-avoid NSAID with CKD.

## 2023-09-15 NOTE — PLAN OF CARE
Problem: MOBILITY - ADULT  Goal: Maintain or return to baseline ADL function  Description: INTERVENTIONS:  -  Assess patient's ability to carry out ADLs; assess patient's baseline for ADL function and identify physical deficits which impact ability to perform ADLs (bathing, care of mouth/teeth, toileting, grooming, dressing, etc.)  - Assess/evaluate cause of self-care deficits   - Assess range of motion  - Assess patient's mobility; develop plan if impaired  - Assess patient's need for assistive devices and provide as appropriate  - Encourage maximum independence but intervene and supervise when necessary  - Involve family in performance of ADLs  - Assess for home care needs following discharge   - Consider OT consult to assist with ADL evaluation and planning for discharge  - Provide patient education as appropriate  Outcome: Progressing     Problem: CARDIOVASCULAR - ADULT  Goal: Maintains optimal cardiac output and hemodynamic stability  Description: INTERVENTIONS:  - Monitor I/O, vital signs and rhythm  - Monitor for S/S and trends of decreased cardiac output  - Administer and titrate ordered vasoactive medications to optimize hemodynamic stability  - Assess quality of pulses, skin color and temperature  - Assess for signs of decreased coronary artery perfusion  - Instruct patient to report change in severity of symptoms  Outcome: Progressing     Problem: CARDIOVASCULAR - ADULT  Goal: Absence of cardiac dysrhythmias or at baseline rhythm  Description: INTERVENTIONS:  - Continuous cardiac monitoring, vital signs, obtain 12 lead EKG if ordered  - Administer antiarrhythmic and heart rate control medications as ordered  - Monitor electrolytes and administer replacement therapy as ordered  Outcome: Progressing     Problem: RESPIRATORY - ADULT  Goal: Achieves optimal ventilation and oxygenation  Description: INTERVENTIONS:  - Assess for changes in respiratory status  - Assess for changes in mentation and behavior  - Position to facilitate oxygenation and minimize respiratory effort  - Oxygen administered by appropriate delivery if ordered  - Initiate smoking cessation education as indicated  - Encourage broncho-pulmonary hygiene including cough, deep breathe, Incentive Spirometry  - Assess the need for suctioning and aspirate as needed  - Assess and instruct to report SOB or any respiratory difficulty  - Respiratory Therapy support as indicated  Outcome: Progressing

## 2023-09-15 NOTE — ASSESSMENT & PLAN NOTE
Wt Readings from Last 3 Encounters:   09/15/23 111 kg (245 lb 3.2 oz)   09/09/23 108 kg (238 lb 1.6 oz)   08/31/23 109 kg (240 lb 9.6 oz)     14 lb weight gain in 4 days. BNP of 1060. Last echo in august. Preserved EF of 70%. Patient had 7 lb weight loss from diuresis between Day 1 and day 2, but did not have significant diuresis (0 lbs) from day 2 to day 3 of admission. Plan:   -Cardiology and Nephro consult, appreciate recs  -Creatinine improved from 2.38 yesterday to 2.11 today, baseline is 1.7-2.1.  -Patient transitioned from IV Lasix 50 mg TID to Torsemide 40 mg BID po today by Nephrology with Cardiology in agreement. Patient's home dose was 60 mg daily. Will be discharged on 40 mg BID.    -Nephro also advised "metolazone 5 mg 60 minutes prior to the morning dose of torsemide only as needed for 2 to 3 pound weight gain, worsening swelling or shortness of breath"  -Monitor I/Os, weights closely

## 2023-09-15 NOTE — ASSESSMENT & PLAN NOTE
Wt Readings from Last 3 Encounters:   09/15/23 111 kg (245 lb 3.2 oz)   09/09/23 108 kg (238 lb 1.6 oz)   08/31/23 109 kg (240 lb 9.6 oz)     14 lb weight gain in 4 days. BNP of 1060. Last echo in august. Preserved EF of 70%. Patient had 7 lb weight loss from diuresis between Day 1 and day 2, but did not have significant diuresis (0 lbs) from day 2 to day 3 of admission. Plan:   -Continue Tele  -Cardiology and Nephro consult, appreciate recs  -Creatinine improved from 2.38 yesterday to 2.33 today, baseline is 1.7-2.1.  -Patient transitioned from IV Lasix 50 mg TID to Torsemide 40 mg BID po today by Nephrology with Cardiology in agreement. Patient's home dose was 60 mg daily.    -Nephro also advised "metolazone 5 mg 60 minutes prior to the morning dose of torsemide only as needed for 2 to 3 pound weight gain, worsening swelling or shortness of breath"  -Monitor I/Os, weights closely  -discharge on diuretics prescription tomorrow possibly

## 2023-09-15 NOTE — ASSESSMENT & PLAN NOTE
Lab Results   Component Value Date    EGFR 22 09/15/2023    EGFR 21 09/14/2023    EGFR 27 09/13/2023    CREATININE 2.33 (H) 09/15/2023    CREATININE 2.38 (H) 09/14/2023    CREATININE 1.97 (H) 09/13/2023       Plan:  - Improved from 2.38-2.11 today. - Follow-up BMP in 1 week with PCP.

## 2023-09-15 NOTE — ASSESSMENT & PLAN NOTE
SBP. Improved.   Vitals:    09/15/23 1039   BP: 134/76   Pulse:    Resp: 18   Temp: (!) 97.4 °F (36.3 °C)   SpO2:          Plan:   BP improved  Follow with PCP outpatient

## 2023-09-15 NOTE — TELEPHONE ENCOUNTER
BMP in system    ----- Message from Drake Rosenberg MD sent at 9/15/2023  8:34 AM EDT -----  Patient admitted to [facility] with volume overload  Renal function essentially baseline with a creatinine of 2.33    Recommendations  1. If discharged from the hospital today did recommend a basic metabolic profile and magnesium 1 week after discharge    2. I am recommending torsemide 40 mg twice daily, and add as needed metolazone 5 mg 60 minutes prior to the morning dose of torsemide only as needed for 2 to 3 pound weight gain, worsening swelling or shortness of breath    3. Follow-up with Dr. Shamika Cortez practitioner in 2 to 3 weeks!   Thank you

## 2023-09-15 NOTE — ASSESSMENT & PLAN NOTE
2/2 CHF exacerbation. Plan  Continue diuresis as per nephro's recs: Torsemide 40 mg BID po.    See above plan for CHF exacerbation

## 2023-09-15 NOTE — ASSESSMENT & PLAN NOTE
Wt Readings from Last 3 Encounters:   09/15/23 111 kg (245 lb 3.2 oz)   09/09/23 108 kg (238 lb 1.6 oz)   08/31/23 109 kg (240 lb 9.6 oz)

## 2023-09-16 LAB
ANION GAP SERPL CALCULATED.3IONS-SCNC: 9 MMOL/L
BUN SERPL-MCNC: 27 MG/DL (ref 5–25)
CALCIUM SERPL-MCNC: 9.1 MG/DL (ref 8.4–10.2)
CHLORIDE SERPL-SCNC: 102 MMOL/L (ref 96–108)
CO2 SERPL-SCNC: 29 MMOL/L (ref 21–32)
CREAT SERPL-MCNC: 2.11 MG/DL (ref 0.6–1.3)
GFR SERPL CREATININE-BSD FRML MDRD: 25 ML/MIN/1.73SQ M
GLUCOSE SERPL-MCNC: 83 MG/DL (ref 65–140)
MAGNESIUM SERPL-MCNC: 2.3 MG/DL (ref 1.9–2.7)
POTASSIUM SERPL-SCNC: 3.9 MMOL/L (ref 3.5–5.3)
SODIUM SERPL-SCNC: 140 MMOL/L (ref 135–147)

## 2023-09-16 PROCEDURE — 99232 SBSQ HOSP IP/OBS MODERATE 35: CPT | Performed by: INTERNAL MEDICINE

## 2023-09-16 PROCEDURE — 83735 ASSAY OF MAGNESIUM: CPT | Performed by: INTERNAL MEDICINE

## 2023-09-16 PROCEDURE — 80048 BASIC METABOLIC PNL TOTAL CA: CPT | Performed by: INTERNAL MEDICINE

## 2023-09-16 PROCEDURE — 99239 HOSP IP/OBS DSCHRG MGMT >30: CPT | Performed by: INTERNAL MEDICINE

## 2023-09-16 RX ORDER — METOLAZONE 5 MG/1
5 TABLET ORAL DAILY
Qty: 10 TABLET | Refills: 0 | Status: SHIPPED | OUTPATIENT
Start: 2023-09-16 | End: 2023-09-16 | Stop reason: SDUPTHER

## 2023-09-16 RX ORDER — METOLAZONE 5 MG/1
5 TABLET ORAL DAILY
Qty: 10 TABLET | Refills: 0 | Status: SHIPPED | OUTPATIENT
Start: 2023-09-16 | End: 2023-09-19 | Stop reason: SDUPTHER

## 2023-09-16 RX ADMIN — METOPROLOL SUCCINATE 50 MG: 50 TABLET, EXTENDED RELEASE ORAL at 08:32

## 2023-09-16 RX ADMIN — DIPHENHYDRAMINE HYDROCHLORIDE 25 MG: 50 INJECTION, SOLUTION INTRAMUSCULAR; INTRAVENOUS at 05:23

## 2023-09-16 RX ADMIN — METOCLOPRAMIDE 10 MG: 5 INJECTION, SOLUTION INTRAMUSCULAR; INTRAVENOUS at 05:23

## 2023-09-16 RX ADMIN — PANTOPRAZOLE SODIUM 40 MG: 40 TABLET, DELAYED RELEASE ORAL at 08:32

## 2023-09-16 RX ADMIN — NYSTATIN: 100000 POWDER TOPICAL at 08:33

## 2023-09-16 RX ADMIN — TORSEMIDE 40 MG: 20 TABLET ORAL at 08:31

## 2023-09-16 RX ADMIN — POTASSIUM CHLORIDE 10 MEQ: 750 TABLET, EXTENDED RELEASE ORAL at 08:32

## 2023-09-16 NOTE — PLAN OF CARE
Problem: MOBILITY - ADULT  Goal: Maintain or return to baseline ADL function  Description: INTERVENTIONS:  -  Assess patient's ability to carry out ADLs; assess patient's baseline for ADL function and identify physical deficits which impact ability to perform ADLs (bathing, care of mouth/teeth, toileting, grooming, dressing, etc.)  - Assess/evaluate cause of self-care deficits   - Assess range of motion  - Assess patient's mobility; develop plan if impaired  - Assess patient's need for assistive devices and provide as appropriate  - Encourage maximum independence but intervene and supervise when necessary  - Involve family in performance of ADLs  - Assess for home care needs following discharge   - Consider OT consult to assist with ADL evaluation and planning for discharge  - Provide patient education as appropriate  Outcome: Progressing  Goal: Maintains/Returns to pre admission functional level  Description: INTERVENTIONS:  - Perform BMAT or MOVE assessment daily.   - Set and communicate daily mobility goal to care team and patient/family/caregiver. - Collaborate with rehabilitation services on mobility goals if consulted  - Perform Range of Motion  times a day. - Reposition patient every  hours.   - Dangle patient  times a day  - Stand patient  times a day  - Ambulate patient  times a day  - Out of bed to chair  times a day   - Out of bed for west times a day  - Out of bed for toileting  - Record patient progress and toleration of activity level   Outcome: Progressing     Problem: CARDIOVASCULAR - ADULT  Goal: Maintains optimal cardiac output and hemodynamic stability  Description: INTERVENTIONS:  - Monitor I/O, vital signs and rhythm  - Monitor for S/S and trends of decreased cardiac output  - Administer and titrate ordered vasoactive medications to optimize hemodynamic stability  - Assess quality of pulses, skin color and temperature  - Assess for signs of decreased coronary artery perfusion  - Instruct patient to report change in severity of symptoms  Outcome: Progressing  Goal: Absence of cardiac dysrhythmias or at baseline rhythm  Description: INTERVENTIONS:  - Continuous cardiac monitoring, vital signs, obtain 12 lead EKG if ordered  - Administer antiarrhythmic and heart rate control medications as ordered  - Monitor electrolytes and administer replacement therapy as ordered  Outcome: Progressing     Problem: RESPIRATORY - ADULT  Goal: Achieves optimal ventilation and oxygenation  Description: INTERVENTIONS:  - Assess for changes in respiratory status  - Assess for changes in mentation and behavior  - Position to facilitate oxygenation and minimize respiratory effort  - Oxygen administered by appropriate delivery if ordered  - Initiate smoking cessation education as indicated  - Encourage broncho-pulmonary hygiene including cough, deep breathe, Incentive Spirometry  - Assess the need for suctioning and aspirate as needed  - Assess and instruct to report SOB or any respiratory difficulty  - Respiratory Therapy support as indicated  Outcome: Progressing

## 2023-09-16 NOTE — PROGRESS NOTES
General Cardiology Progress Note   James Whitaker 62 y.o. female MRN: [id number]  Unit/Bed#: S -01 Encounter: [id number]      Assessment:  Principal Problem:    Acute on chronic heart failure with preserved ejection fraction (HCC)  Active Problems:    Headache    Nonischemic nontraumatic myocardial injury due to CHF    Paroxysmal A-fib (HCC)    Essential hypertension    Acute kidney injury superimposed on chronic kidney disease Stage 4 due to diuresis    Bilateral lower extremity edema    Facial numbness, resolved      Impression:    • Acute on chronic diastolic CHF/improved ejection fraction  o Etiology = ran out of home diuretic  o Baseline weight around 235 pounds, admission weight 252 pounds  o Weight today 239 pounds on a standing scale  o On torsemide 40 mg p.o. twice daily per nephrology, transitioned yesterday  • History of tachycardia mediated cardiomyopathy due to atrial flutter  • Hypertrophic cardiomyopathy  • VT status post ICD  • History of atrial flutter status post ablation  • CKD4, baseline creatinine considered 1.8-2.2, she is at baseline    Plan:    • She is stable for discharge from my perspective    Subjective:   Patient seen and examined. She feels well, no edema, no orthopnea, shortness of breath improved, blood pressure stable, renal function stable and weights nicely reduced. Review of Systems   Constitutional: Negative for diaphoresis, fever, malaise/fatigue and night sweats. Cardiovascular: Negative for chest pain, dyspnea on exertion, leg swelling, orthopnea, palpitations and syncope. Respiratory: Negative for cough, shortness of breath, sputum production and wheezing. Skin: Negative for itching and rash. Gastrointestinal: Negative for abdominal pain, change in bowel habit and diarrhea. Neurological: Positive for focal weakness. Negative for dizziness, light-headedness and weakness.        Objective   Vitals: Blood pressure 136/69, pulse 67, temperature 97.5 °F (36.4 °C), temperature source Oral, resp. rate 18, weight 109 kg (239 lb 10.2 oz), SpO2 93 %, not currently breastfeeding., Body mass index is 37.53 kg/m²., I/O last 3 completed shifts: In: 300 [P.O.:300]  Out: 1150 [Urine:1150]  No intake/output data recorded. Wt Readings from Last 3 Encounters:   09/16/23 109 kg (239 lb 10.2 oz)   09/09/23 108 kg (238 lb 1.6 oz)   08/31/23 109 kg (240 lb 9.6 oz)       Intake/Output Summary (Last 24 hours) at 9/16/2023 0725  Last data filed at 9/15/2023 0753  Gross per 24 hour   Intake 300 ml   Output 600 ml   Net -300 ml     I/O last 3 completed shifts: In: 300 [P.O.:300]  Out: 1150 [Urine:1150]    No significant arrhythmias seen on telemetry review. Physical Exam  Constitutional:       General: She is not in acute distress. Appearance: She is not diaphoretic. HENT:      Head: Normocephalic. Eyes:      Conjunctiva/sclera: Conjunctivae normal.   Neck:      Vascular: No JVD. Cardiovascular:      Rate and Rhythm: Normal rate and regular rhythm. Heart sounds: Normal heart sounds. No murmur heard. No gallop. Pulmonary:      Effort: Pulmonary effort is normal. No respiratory distress. Breath sounds: Normal breath sounds. No wheezing or rales. Abdominal:      General: Bowel sounds are normal. There is no distension. Palpations: Abdomen is soft. Tenderness: There is no abdominal tenderness. Musculoskeletal:         General: Normal range of motion. Cervical back: Normal range of motion and neck supple. Right lower leg: No edema. Left lower leg: No edema. Skin:     General: Skin is warm and dry. Neurological:      Mental Status: She is alert and oriented to person, place, and time.          Meds/Allergies   Allergies   Allergen Reactions   • Coconut Oil - Food Allergy Hives   • Iodinated Contrast Media Hives   • Tape  [Medical Tape] Hives       Current Facility-Administered Medications:   •  acetaminophen (TYLENOL) tablet 650 mg, 650 mg, Oral, Q6H PRN, Tevin Grossman MD, 650 mg at 09/14/23 1141  •  Diclofenac Sodium (VOLTAREN) 1 % topical gel 2 g, 2 g, Topical, Q6H PRN, Lisa Ramos MD  •  doxazosin (CARDURA) tablet 2 mg, 2 mg, Oral, HS, Lisa Ramos MD, 2 mg at 09/15/23 2243  •  ferrous gluconate (FERGON) tablet 162 mg, 162 mg, Oral, Once per day on Mon Wed Fri, Lisa Ramos MD, 162 mg at 09/15/23 4500  •  metoprolol succinate (TOPROL-XL) 24 hr tablet 50 mg, 50 mg, Oral, Daily, Lisa Ramos MD, 50 mg at 09/15/23 5867  •  nicotine (NICODERM CQ) 7 mg/24hr TD 24 hr patch 1 patch, 1 patch, Transdermal, Daily, Lisa Ramos MD, 1 patch at 09/15/23 0902  •  nystatin (MYCOSTATIN) powder, , Topical, BID, Bill Brennan, , 1 Application at 66/94/42 1727  •  pantoprazole (PROTONIX) EC tablet 40 mg, 40 mg, Oral, Daily, Lisa Ramos MD, 40 mg at 09/15/23 0901  •  potassium chloride (K-DUR,KLOR-CON) CR tablet 10 mEq, 10 mEq, Oral, Daily, Carissa Greenfield MD, 10 mEq at 09/15/23 8634  •  rivaroxaban (XARELTO) tablet 15 mg, 15 mg, Oral, QPM, Lisa Ramos MD, 15 mg at 09/15/23 1726  •  SUMAtriptan (IMITREX) subcutaneous injection 6 mg, 6 mg, Subcutaneous, Q1H PRN, Lisa Ramos MD, 6 mg at 09/15/23 2246  •  torsemide (DEMADEX) tablet 40 mg, 40 mg, Oral, BID, Janelle Johns MD, 40 mg at 09/15/23 1726    Laboratory Results:        CBC with diff:   Results from last 7 days   Lab Units 09/15/23  0438 09/14/23  0451 09/13/23  0655   WBC Thousand/uL 3.59* 3.58* 4.35   HEMOGLOBIN g/dL 11.2* 10.8* 11.8   HEMATOCRIT % 36.1 36.8 38.5   MCV fL 86 88 86   PLATELETS Thousands/uL 123* 110* 131*   RBC Million/uL 4.22 4.19 4.48   MCH pg 26.5* 25.8* 26.3*   MCHC g/dL 31.0* 29.3* 30.6*   RDW % 16.0* 16.2* 16.2*   MPV fL 12.5 11.3 12.3   NRBC AUTO /100 WBCs 0  --  0       CMP:  Results from last 7 days   Lab Units 09/16/23  0431 09/15/23  0438 09/14/23  0451 09/13/23  0655   SODIUM mmol/L 140 139 140 140   POTASSIUM mmol/L 3.9 4.2 4.5 4.7   CHLORIDE mmol/L 102 103 104 107 CO2 mmol/L 29 30 29 27   BUN mg/dL 27* 28* 27* 28*   CREATININE mg/dL 2.11* 2.33* 2.38* 1.97*   CALCIUM mg/dL 9.1 8.8 8.7 9.2   AST U/L  --   --   --  33   ALT U/L  --   --   --  16   ALK PHOS U/L  --   --   --  71   EGFR ml/min/1.73sq m 25 22 21 27       BMP:  Results from last 7 days   Lab Units 09/16/23  0431 09/15/23  0438 09/14/23  0451 09/13/23  0655   SODIUM mmol/L 140 139 140 140   POTASSIUM mmol/L 3.9 4.2 4.5 4.7   CHLORIDE mmol/L 102 103 104 107   CO2 mmol/L 29 30 29 27   BUN mg/dL 27* 28* 27* 28*   CREATININE mg/dL 2.11* 2.33* 2.38* 1.97*   CALCIUM mg/dL 9.1 8.8 8.7 9.2       NT-proBNP: No results for input(s): "NTBNP" in the last 72 hours. Magnesium:   Results from last 7 days   Lab Units 09/16/23  0431   MAGNESIUM mg/dL 2.3       Coags:       TSH:        Hemoglobin A1C )      Lipid Profile:   No results found for: "CHOL"  Lab Results   Component Value Date    HDL 40 (L) 04/05/2023    HDL 42 (L) 02/07/2021    HDL 43 09/17/2020     Lab Results   Component Value Date    LDLCALC 68 04/05/2023    LDLCALC 57 02/07/2021    LDLCALC 72 09/17/2020     No results found for: "LDLDIRECT"  Lab Results   Component Value Date    TRIG 83 04/05/2023    TRIG 80.9 02/07/2021    TRIG 89 09/17/2020       Cardiac testing:   EKG personally reviewed by Levi Darling MD.     Cath:  ECHO:  Stress TEST:  Other:        Levi Darling MD    Portions of the record may have been created with voice recognition software. Occasional wrong word or "sound a like" substitutions may have occurred due to the inherent limitations of voice recognition software. Read the chart carefully and recognize, using context, where substitutions have occurred.

## 2023-09-16 NOTE — DISCHARGE INSTR - AVS FIRST PAGE
Dear Carolina Mondragon,     It was our pleasure to care for you here at [facility], [facility]. It is our hope that we were always able to exceed the expected standards for your care during your stay. You were hospitalized due to CHF exacerbation. You were cared for on the 4th floor by Promise Sanders DO under the service of Clint Sue MD with the [facility] who covers for your primary care physician (PCP), Rosalio Delgadillo MD, while you were hospitalized. If you have any questions or concerns related to this hospitalization, you may contact us at [phone]. For follow up as well as any medication refills, we recommend that you follow up with your primary care physician. A registered nurse will reach out to you by phone within a few days after your discharge to answer any additional questions that you may have after going home. However, at this time we provide for you here, the most important instructions / recommendations at discharge:     Notable Medication Adjustments -   Please start torsemide 40 mg twice a day. This is your increased dose from a total of 60 mg a day. Please take Metolazone 5 mg as needed for excessive leg swelling. Testing Required after Discharge -   Please complete a BMP 1 week after discharge with your PCP. Important follow up information -   Please follow-up with your primary care provider, cardiology, and nephrology in 1 week. Other Instructions -   Please contact your primary care provider or come to the ED if you have increased shortness of breath or chest pain. Please review this entire after visit summary as additional general instructions including medication list, appointments, activity, diet, any pertinent wound care, and other additional recommendations from your care team that may be provided for you.       Sincerely,     Promise Sanders DO

## 2023-09-16 NOTE — DISCHARGE SUMMARY
[address]  Discharge- Mesha Vergara 1965, 62 y.o. female MRN: [id number]  Unit/Bed#: S -01 Encounter: [id number]  Primary Care Provider: Viktoriya Smith MD   Date and time admitted to hospital: 9/13/2023  5:44 AM    * Acute on chronic heart failure with preserved ejection fraction [facility])  Assessment & Plan  Wt Readings from Last 3 Encounters:   09/15/23 111 kg (245 lb 3.2 oz)   09/09/23 108 kg (238 lb 1.6 oz)   08/31/23 109 kg (240 lb 9.6 oz)     14 lb weight gain in 4 days. BNP of 1060. Last echo in august. Preserved EF of 70%. Patient had 7 lb weight loss from diuresis between Day 1 and day 2, but did not have significant diuresis (0 lbs) from day 2 to day 3 of admission. Plan:   -Cardiology and Nephro consult, appreciate recs  -Creatinine improved from 2.38 yesterday to 2.11 today, baseline is 1.7-2.1.  -Patient transitioned from IV Lasix 50 mg TID to Torsemide 40 mg BID po today by Nephrology with Cardiology in agreement. Patient's home dose was 60 mg daily. Will be discharged on 40 mg BID. -Nephro also advised "metolazone 5 mg 60 minutes prior to the morning dose of torsemide only as needed for 2 to 3 pound weight gain, worsening swelling or shortness of breath"  -Monitor I/Os, weights closely      Facial numbness, resolved  Assessment & Plan  Patient c/o R facial numbness since this morning. Improved now. Most likely due to hypertensive urgency vs neurologic since asymptomatic otherwise. Plan  Continue to monitor as this is currently resolved. Bilateral lower extremity edema  Assessment & Plan  2/2 CHF exacerbation. Plan  Follow-up with PCP in 1 week.   See plan above    Acute kidney injury superimposed on chronic kidney disease Stage 4 due to diuresis  Assessment & Plan  Lab Results   Component Value Date    EGFR 22 09/15/2023    EGFR 21 09/14/2023    EGFR 27 09/13/2023    CREATININE 2.33 (H) 09/15/2023    CREATININE 2.38 (H) 09/14/2023 CREATININE 1.97 (H) 09/13/2023       Plan:  - Improved from 2.38-2.11 today. - Follow-up BMP in 1 week with PCP. Essential hypertension  Assessment & Plan   SBP. Improved. Vitals:    09/15/23 1039   BP: 134/76   Pulse:    Resp: 18   Temp: (!) 97.4 °F (36.3 °C)   SpO2:          Plan:   BP improved  Follow with PCP outpatient      Paroxysmal A-fib [facility])  Assessment & Plan  Patient with pacemaker. On metoprolol and xarelto    Plan  Cardio on board, continue metoprolol and renally dosed xarelto, per cardio  Continue home meds on discharge    Nonischemic nontraumatic myocardial injury due to CHF  Assessment & Plan  Lab Results   Component Value Date    HSTNI0 64 (H) 09/13/2023    HSTNI2 67 (H) 09/13/2023    HSTNID2 3 09/13/2023    HSTNI4 59 (H) 09/13/2023    HSTNID4 -5 09/13/2023     Most likely due to CHF exacerbation, resolved    Headache  Assessment & Plan  Recurrent headaches present in past admissions. Plan  -Migraine cocktail as needed-avoid NSAID with CKD. Can follow with PCP        Medical Problems     Resolved Problems  Date Reviewed: 9/16/2023   None       Discharging Resident: Bisi Rizvi DO  Discharging Attending: Shantell Tomlinson MD  PCP: Yosi Pompa MD  Admission Date:   Admission Orders (From admission, onward)     Ordered        09/13/23 0832  INPATIENT ADMISSION  Once                      Discharge Date: 09/16/23    Consultations During Hospital Stay:  · Cardiology, nephrology    Procedures Performed:   · None    Significant Findings / Test Results:   · Weight was elevated to 52 pounds from her reported dry weight of 230-236 pounds. Incidental Findings:   · None    Test Results Pending at Discharge (will require follow up):   · None     Outpatient Tests Requested:  · BMP in 1 week with PCP    Complications: None    Reason for Admission: Acute exacerbation of heart failure    Hospital Course:   Roly Sparrow is a 62 y.o. female patient who originally presented to the hospital on 9/13/2023 due to acute exacerbation of heart failure. Patient states that she was discharged a few days prior to her current admission. Once she got home she realized she did not have any diuretics. She tried to get in touch with her PCP but was unable to do so for any refills. Patient did not get her medication for 3 to 4 days before coming back to the ED with almost a 15 pound weight gain from her dry weight. Patient reported considerable shortness of breath and cough. Her BNP was elevated at 1060 her last echo was completed in August with a EF of 70%. She has chronic kidney disease stage IV with a baseline creatinine 1.7-2.1. She was started on IV furosemide 50 mg 3 times a day and after she diuresed well, she was transitioned to torsemide 40 mg twice daily. Her home dose was torsemide 60 mg total daily and this was increased by nephrology with cardiology in agreement. As she improved considerably and lost almost 13 pounds of fluid in these days during her admission, she will be able to be discharged today. She is doing well and states that she has no further complaints. Please see above list of diagnoses and related plan for additional information. Condition at Discharge: fair    Discharge Day Visit / Exam:   Subjective: Patient seen at bedside today. No acute events overnight. Patient is doing well and looking forward to going home today. Patient denies any shortness of breath, chest pain, abdominal pain, nausea, vomiting, constipation, diarrhea, urinary symptoms. She states she is urinating frequently and feels much better today. Vitals: Blood Pressure: 136/69 (09/16/23 0715)  Pulse: 67 (09/16/23 0715)  Temperature: 97.5 °F (36.4 °C) (09/16/23 0715)  Temp Source: Oral (09/16/23 0715)  Respirations: 18 (09/16/23 0715)  Weight - Scale: 109 kg (239 lb 10.2 oz) (09/16/23 0600)  SpO2: 93 % (09/16/23 0715)  Exam:   Physical Exam  Vitals and nursing note reviewed. Constitutional:       General: She is not in acute distress. Appearance: Normal appearance. She is obese. She is not ill-appearing, toxic-appearing or diaphoretic. HENT:      Head: Normocephalic and atraumatic. Mouth/Throat:      Mouth: Mucous membranes are moist.   Eyes:      General: No scleral icterus. Pupils: Pupils are equal, round, and reactive to light. Cardiovascular:      Rate and Rhythm: Normal rate and regular rhythm. Pulses: Normal pulses. Heart sounds: No friction rub. No gallop. Comments: JVD present  Pulmonary:      Effort: Pulmonary effort is normal. No respiratory distress. Breath sounds: Wheezing (Expiratory wheezes bilaterally) present. No rhonchi or rales. Abdominal:      General: Bowel sounds are normal. There is no distension. Tenderness: There is no abdominal tenderness. There is no guarding or rebound. Musculoskeletal:         General: No swelling. Normal range of motion. Cervical back: Normal range of motion and neck supple. Right lower leg: No edema. Left lower leg: No edema. Skin:     General: Skin is warm. Coloration: Skin is not jaundiced. Neurological:      General: No focal deficit present. Mental Status: She is alert and oriented to person, place, and time. Cranial Nerves: No cranial nerve deficit. Psychiatric:         Mood and Affect: Mood normal.         Behavior: Behavior normal.         Thought Content: Thought content normal.         Judgment: Judgment normal.          Discussion with Family: Patient declined call to . Discharge instructions/Information to patient and family:   See after visit summary for information provided to patient and family. Provisions for Follow-Up Care:  See after visit summary for information related to follow-up care and any pertinent home health orders.        Disposition:   Home    Planned Readmission: None    Discharge Medications:  See after visit summary for reconciled discharge medications provided to patient and/or family.       **Please Note: This note may have been constructed using a voice recognition system**

## 2023-09-17 VITALS
RESPIRATION RATE: 15 BRPM | BODY MASS INDEX: 37.53 KG/M2 | OXYGEN SATURATION: 93 % | HEART RATE: 67 BPM | SYSTOLIC BLOOD PRESSURE: 143 MMHG | DIASTOLIC BLOOD PRESSURE: 87 MMHG | WEIGHT: 239.64 LBS | TEMPERATURE: 97.5 F

## 2023-09-18 ENCOUNTER — TELEPHONE (OUTPATIENT)
Dept: CARDIOLOGY CLINIC | Facility: CLINIC | Age: 58
End: 2023-09-18

## 2023-09-18 ENCOUNTER — PATIENT OUTREACH (OUTPATIENT)
Dept: CASE MANAGEMENT | Facility: HOSPITAL | Age: 58
End: 2023-09-18

## 2023-09-18 ENCOUNTER — TELEPHONE (OUTPATIENT)
Dept: NEPHROLOGY | Facility: CLINIC | Age: 58
End: 2023-09-18

## 2023-09-18 ENCOUNTER — APPOINTMENT (OUTPATIENT)
Dept: LAB | Facility: CLINIC | Age: 58
End: 2023-09-18
Payer: COMMERCIAL

## 2023-09-18 ENCOUNTER — TELEPHONE (OUTPATIENT)
Dept: FAMILY MEDICINE CLINIC | Facility: CLINIC | Age: 58
End: 2023-09-18

## 2023-09-18 DIAGNOSIS — N18.32 STAGE 3B CHRONIC KIDNEY DISEASE (HCC): ICD-10-CM

## 2023-09-18 DIAGNOSIS — N18.4 STAGE 4 CHRONIC KIDNEY DISEASE (HCC): ICD-10-CM

## 2023-09-18 DIAGNOSIS — I50.33 ACUTE ON CHRONIC HEART FAILURE WITH PRESERVED EJECTION FRACTION (HCC): Primary | ICD-10-CM

## 2023-09-18 DIAGNOSIS — I50.33 ACUTE ON CHRONIC DIASTOLIC CHF (CONGESTIVE HEART FAILURE) (HCC): Chronic | ICD-10-CM

## 2023-09-18 DIAGNOSIS — N17.9 ACUTE KIDNEY INJURY (HCC): ICD-10-CM

## 2023-09-18 DIAGNOSIS — I50.33 ACUTE ON CHRONIC HEART FAILURE WITH PRESERVED EJECTION FRACTION (HFPEF) (HCC): ICD-10-CM

## 2023-09-18 LAB
ANION GAP SERPL CALCULATED.3IONS-SCNC: 9 MMOL/L
BNP SERPL-MCNC: 484 PG/ML (ref 0–100)
BUN SERPL-MCNC: 32 MG/DL (ref 5–25)
CALCIUM SERPL-MCNC: 9.7 MG/DL (ref 8.4–10.2)
CHLORIDE SERPL-SCNC: 101 MMOL/L (ref 96–108)
CO2 SERPL-SCNC: 31 MMOL/L (ref 21–32)
CREAT SERPL-MCNC: 2.97 MG/DL (ref 0.6–1.3)
GFR SERPL CREATININE-BSD FRML MDRD: 16 ML/MIN/1.73SQ M
GLUCOSE SERPL-MCNC: 107 MG/DL (ref 65–140)
MAGNESIUM SERPL-MCNC: 2.3 MG/DL (ref 1.9–2.7)
POTASSIUM SERPL-SCNC: 4.1 MMOL/L (ref 3.5–5.3)
SODIUM SERPL-SCNC: 141 MMOL/L (ref 135–147)

## 2023-09-18 PROCEDURE — 80048 BASIC METABOLIC PNL TOTAL CA: CPT

## 2023-09-18 PROCEDURE — 83880 ASSAY OF NATRIURETIC PEPTIDE: CPT

## 2023-09-18 PROCEDURE — 83735 ASSAY OF MAGNESIUM: CPT

## 2023-09-18 PROCEDURE — 36415 COLL VENOUS BLD VENIPUNCTURE: CPT

## 2023-09-18 RX ORDER — TORSEMIDE 20 MG/1
40 TABLET ORAL 2 TIMES DAILY
Qty: 120 TABLET | Refills: 0 | Status: SHIPPED | OUTPATIENT
Start: 2023-09-18 | End: 2023-10-07

## 2023-09-18 NOTE — PROGRESS NOTES
Heart Failure Outpatient Care Coordinator Note: Call made to patient. She reports her weight is 137# today. Discharge weight 139.8#. I attempted to med review, but patient is not home at present. She did have her AVS and states she could not  her torsemide as was told her insurance would not cover it. She states she borrowed pills from a friend who is also on torsemide. Call to [facility]. Pharmacy tech states need to be written 20 mg tablets 2 am and 2 pm.     Patient did  her metolazone, but states is written for daily, not prn and she has taken daily since discharge. Call made to Dr. Goss Just to request new scripts be sent again to [facility] [address]. He recommends patient take no more metolazone, hold torsemide and get BMP today or tomorrow am.     Call to patient with above information and confirmed understanding. Patient also reports she and her  Mark Anthony Olivas are homeless, sleeping on couch at [employer]. Lost her housing when [id number] was selling several months ago. She states she never told anyone at the hospital because she thought they would "put me in a nursing home." States filled out application for [employer], but has not heard back. Reached out to [employer] today, but had to leave message and someone would return call with in 24 hours and gave her another number to call. She states has been trying to get disability for awhile. Working with a .   Referral made for MSW to assist.

## 2023-09-18 NOTE — UTILIZATION REVIEW
NOTIFICATION OF ADMISSION DISCHARGE   This is a Notification of Discharge from [facility]. Please be advised that this patient has been discharge from our facility. Below you will find the admission and discharge date and time including the patient’s disposition. UTILIZATION REVIEW CONTACT:  Alisha Rubalcava  Utilization   Network Utilization Review Department  Phone: [phone] x carefully listen to the prompts. All voicemails are confidential.  Email: [email or web address]     ADMISSION INFORMATION  PRESENTATION DATE: 9/13/2023  5:44 AM  OBERVATION ADMISSION DATE:   INPATIENT ADMISSION DATE: 9/13/23  8:32 AM   DISCHARGE DATE: 9/16/2023  1:03 PM   DISPOSITION:Home/Self Care    IMPORTANT INFORMATION:  Send all requests for admission clinical reviews, approved or denied determinations and any other requests to dedicated fax number below belonging to the campus where the patient is receiving treatment.  List of dedicated fax numbers:  [place] [facility] (Administrative/Medical Necessity) [phone] [address] [facility] (Maternity/NICU/Pediatrics) [phone]   [facility] [phone]   [facility] [phone] [address] [facility] [phone]   [address] [place] [address] [phone]   [facility] [phone]   [address] [facility] [phone]   [address] [place] [address] [phone]   [address] [facility] [phone]   [address] [facility] [address] [facility] [phone]

## 2023-09-18 NOTE — TELEPHONE ENCOUNTER
Good morning,    Patient was discharged on 9/16 and can now be scheduled for a HF hospital follow up. Can she please be scheduled on Wednesday, no later than this Friday.      Thank you,    Felicia Carter

## 2023-09-18 NOTE — TELEPHONE ENCOUNTER
----- Message from Ana Hector MD sent at 9/18/2023  4:20 PM EDT -----  Please have her do repeat BMP and magnesium in 1 week as instructed below. Also she should monitor daily weight and call us back in 7 to 10 days with daily weights.  ----- Message -----  From: Lavelle Mahajan MD  Sent: 9/15/2023   8:36 AM EDT  To: Ana Hector MD; #    Patient admitted to [address] [place] [address] with volume overload  Renal function essentially baseline with a creatinine of 2.33    Recommendations  1. If discharged from the hospital today did recommend a basic metabolic profile and magnesium 1 week after discharge    2. I am recommending torsemide 40 mg twice daily, and add as needed metolazone 5 mg 60 minutes prior to the morning dose of torsemide only as needed for 2 to 3 pound weight gain, worsening swelling or shortness of breath    3. Follow-up with Dr. Fany Lundberg practitioner in 2 to 3 weeks!   Thank you

## 2023-09-19 ENCOUNTER — TELEPHONE (OUTPATIENT)
Dept: OTHER | Facility: HOSPITAL | Age: 58
End: 2023-09-19

## 2023-09-19 ENCOUNTER — TELEPHONE (OUTPATIENT)
Dept: CARDIOLOGY CLINIC | Facility: CLINIC | Age: 58
End: 2023-09-19

## 2023-09-19 DIAGNOSIS — E87.8 ELECTROLYTE ABNORMALITY: ICD-10-CM

## 2023-09-19 DIAGNOSIS — I50.9 CHF EXACERBATION (HCC): ICD-10-CM

## 2023-09-19 DIAGNOSIS — N18.32 STAGE 3B CHRONIC KIDNEY DISEASE (HCC): Primary | ICD-10-CM

## 2023-09-19 DIAGNOSIS — N18.32 STAGE 3B CHRONIC KIDNEY DISEASE (HCC): ICD-10-CM

## 2023-09-19 RX ORDER — METOLAZONE 5 MG/1
5 TABLET ORAL DAILY PRN
Qty: 10 TABLET | Refills: 0 | Status: SHIPPED | OUTPATIENT
Start: 2023-09-19 | End: 2023-09-19 | Stop reason: SDUPTHER

## 2023-09-19 RX ORDER — METOLAZONE 5 MG/1
5 TABLET ORAL AS NEEDED
Qty: 10 TABLET | Refills: 0 | Status: SHIPPED | OUTPATIENT
Start: 2023-09-19

## 2023-09-19 NOTE — TELEPHONE ENCOUNTER
Dr Bledsoe Running just an [facility] she has an appt with you on Friday.

## 2023-09-19 NOTE — TELEPHONE ENCOUNTER
Please refer to separate telephone call note from today regarding calling patient's with instructions for torsemide and metolazone. I have also updated metolazone prescription to the pharmacy as as needed.

## 2023-09-19 NOTE — TELEPHONE ENCOUNTER
Hossein from [facility] called stating pt needs a new refill on Xarelto sent to [facility] since [address]

## 2023-09-19 NOTE — TELEPHONE ENCOUNTER
Hi, good afternoon. My name is Bryson Koehler. I am a nurse . I work with the [facility] calling about a mutual patient. Her name is Peng Rodriguez. YOB: 1965. She was just discharged on Saturday. So we reviewed her medications and she mentioned metoprolol, but Metoprolol hasn't been filled since April or May. She told me she still has some I'm not sure how if she's taking it like she's supposed to be. Is she supposed to be on metoprolol? If so, can you confirm the dosing and the frequency?    C/b# [phone]

## 2023-09-19 NOTE — TELEPHONE ENCOUNTER
Requested medication(s) are due for refill today: yes  Patient has already received a courtesy refill: No  Other reason request has been forwarded to provider: failed protocol

## 2023-09-19 NOTE — TELEPHONE ENCOUNTER
Please let her know that creatinine has worsened up to 2.9 since discharge from the hospital likely due to incorrectly taking metolazone along with her torsemide. I was notified by outpatient heart failure care manager Chris Ramos regarding patient and currently taking metolazone daily instead of only as needed when she has weight gain as instructed. Also was discharged on torsemide 40 mg twice daily. This could be the reason why her creatinine is increased to 2.9. For now please have her hold further metolazone. Also hold torsemide for next 2 days and then continue 40 mg twice daily afterwards. She needs to have repeat BMP on 9/25/2023 on Monday.

## 2023-09-19 NOTE — TELEPHONE ENCOUNTER
----- Message from Flaco Felipe RN sent at 9/19/2023  6:54 AM EDT -----  Regarding: Rhonda Jackson morning Dr. Wooten Betters am following Schuyler Bob for outpatient care management. She has lots of complex social issues and is a high uilizer for ED visits and admissions. Yesterday, I did a med review with her and discovered she had been taking metolazone 5 mg daily (x3 days) as Rx was written incorrectly and patient thought it was for her migraines. I did make Dr. Montserrat Loyd aware of residents error, and that I would follow up with you. I do see that you reviewed labs and ordered repeat labs. I told her to stop the metolazone and get her BMP yesterday instead of later in the week. Her discharge weight was 239.6# and yesterday weight 237#. She feels ok. I told her to hold her am torsemide till labs back and someone calls her. She was ordered 40 mg bid. Can you please ask someone is your office to let her know about further torsemide dose when they call her about repeat labs. I am out at an all day conference and do not have access to Epic, but am available on tiger text.  I will be in touch with her later today and in am.    Thank you,  Duglas Cesar MS, RN Heart Failure Outpatient Care Manager

## 2023-09-20 ENCOUNTER — PATIENT OUTREACH (OUTPATIENT)
Dept: CASE MANAGEMENT | Facility: HOSPITAL | Age: 58
End: 2023-09-20

## 2023-09-20 NOTE — PROGRESS NOTES
Heart Failure Outpatient Care Coordinator Note: Call made to patient who reports she is just leaving PCP office. States her PCP had no concerns and only recommended zyrtec for her allergies. Weight was 237#. I requested to do a medication review, but she is heading to work for a few hours and request call after 12 noon. She states she is taking all her medication as listed on the last AVS and states has been taking her metoprolol "all along". She did  her torsemide and will restart tomorrow. Will call her this afternoon. Return call made to patient who is driving and requests call back tomorrow after 12n. Will follow up.

## 2023-09-22 ENCOUNTER — PATIENT OUTREACH (OUTPATIENT)
Dept: CASE MANAGEMENT | Facility: HOSPITAL | Age: 58
End: 2023-09-22

## 2023-09-22 NOTE — PROGRESS NOTES
Heart Failure Outpatient Care Coordinator Note: Call to patient and reminded her of today's cardiology appointment and to bring her medication. States weight yesterday 236#- no worsening symptoms. She states did not weigh this am as "got up late". She remains staying at family or friends house overnight and then "pretty much living out of her car". Referral was made of [facility] worker to assist her and  with shelter and applying for more permanent housing. Will follow up.

## 2023-09-26 ENCOUNTER — PATIENT OUTREACH (OUTPATIENT)
Dept: CASE MANAGEMENT | Facility: HOSPITAL | Age: 58
End: 2023-09-26

## 2023-09-26 ENCOUNTER — OFFICE VISIT (OUTPATIENT)
Dept: NEPHROLOGY | Facility: CLINIC | Age: 58
End: 2023-09-26
Payer: COMMERCIAL

## 2023-09-26 VITALS — SYSTOLIC BLOOD PRESSURE: 110 MMHG | BODY MASS INDEX: 37.12 KG/M2 | WEIGHT: 237 LBS | DIASTOLIC BLOOD PRESSURE: 68 MMHG

## 2023-09-26 DIAGNOSIS — N20.0 NEPHROLITHIASIS: ICD-10-CM

## 2023-09-26 DIAGNOSIS — N18.4 STAGE 4 CHRONIC KIDNEY DISEASE (HCC): ICD-10-CM

## 2023-09-26 DIAGNOSIS — N28.1 RENAL CYST: ICD-10-CM

## 2023-09-26 DIAGNOSIS — B18.2 CHRONIC HEPATITIS C WITHOUT HEPATIC COMA (HCC): ICD-10-CM

## 2023-09-26 DIAGNOSIS — I12.9 BENIGN HYPERTENSION WITH CKD (CHRONIC KIDNEY DISEASE) STAGE IV (HCC): Primary | ICD-10-CM

## 2023-09-26 DIAGNOSIS — D63.1 ANEMIA IN STAGE 4 CHRONIC KIDNEY DISEASE: ICD-10-CM

## 2023-09-26 DIAGNOSIS — N18.4 ANEMIA IN STAGE 4 CHRONIC KIDNEY DISEASE: ICD-10-CM

## 2023-09-26 DIAGNOSIS — E87.6 HYPOKALEMIA: ICD-10-CM

## 2023-09-26 DIAGNOSIS — N18.4 BENIGN HYPERTENSION WITH CKD (CHRONIC KIDNEY DISEASE) STAGE IV (HCC): Primary | ICD-10-CM

## 2023-09-26 PROBLEM — R60.0 BILATERAL LOWER EXTREMITY EDEMA: Status: RESOLVED | Noted: 2023-07-22 | Resolved: 2023-09-26

## 2023-09-26 PROBLEM — N18.9 ACUTE KIDNEY INJURY SUPERIMPOSED ON CHRONIC KIDNEY DISEASE: Status: RESOLVED | Noted: 2023-01-14 | Resolved: 2023-09-26

## 2023-09-26 PROBLEM — N17.9 ACUTE KIDNEY INJURY SUPERIMPOSED ON CHRONIC KIDNEY DISEASE: Status: RESOLVED | Noted: 2023-01-14 | Resolved: 2023-09-26

## 2023-09-26 PROBLEM — I16.0 HYPERTENSIVE URGENCY: Status: RESOLVED | Noted: 2022-01-30 | Resolved: 2023-09-26

## 2023-09-26 PROBLEM — N18.30 CHRONIC KIDNEY DISEASE, STAGE 3 (HCC): Status: RESOLVED | Noted: 2022-01-31 | Resolved: 2023-09-26

## 2023-09-26 PROBLEM — I10 ESSENTIAL HYPERTENSION: Status: RESOLVED | Noted: 2022-04-08 | Resolved: 2023-09-26

## 2023-09-26 PROCEDURE — 99214 OFFICE O/P EST MOD 30 MIN: CPT | Performed by: INTERNAL MEDICINE
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
## 2023-09-26 NOTE — PROGRESS NOTES
Heart Failure Outpatient Care Coordinator Note: Chart notes reviewed. Call made to patient and left message. Has a renal appointment today. Will follow up.

## 2023-09-27 ENCOUNTER — PATIENT OUTREACH (OUTPATIENT)
Dept: CASE MANAGEMENT | Facility: HOSPITAL | Age: 58
End: 2023-09-27

## 2023-09-27 ENCOUNTER — TELEPHONE (OUTPATIENT)
Dept: NEPHROLOGY | Facility: CLINIC | Age: 58
End: 2023-09-27

## 2023-09-27 ENCOUNTER — APPOINTMENT (OUTPATIENT)
Dept: LAB | Facility: CLINIC | Age: 58
End: 2023-09-27
Payer: COMMERCIAL

## 2023-09-27 DIAGNOSIS — R00.0 TACHYCARDIA INDUCED CARDIOMYOPATHY (HCC): ICD-10-CM

## 2023-09-27 DIAGNOSIS — I50.33 ACUTE ON CHRONIC DIASTOLIC CHF (CONGESTIVE HEART FAILURE) (HCC): Chronic | ICD-10-CM

## 2023-09-27 DIAGNOSIS — N18.4 STAGE 4 CHRONIC KIDNEY DISEASE (HCC): ICD-10-CM

## 2023-09-27 DIAGNOSIS — E87.6 HYPOKALEMIA: Primary | ICD-10-CM

## 2023-09-27 DIAGNOSIS — N18.32 STAGE 3B CHRONIC KIDNEY DISEASE (HCC): ICD-10-CM

## 2023-09-27 DIAGNOSIS — I43 TACHYCARDIA INDUCED CARDIOMYOPATHY (HCC): ICD-10-CM

## 2023-09-27 DIAGNOSIS — I48.0 PAROXYSMAL A-FIB (HCC): ICD-10-CM

## 2023-09-27 LAB
ANION GAP SERPL CALCULATED.3IONS-SCNC: 9 MMOL/L
BUN SERPL-MCNC: 28 MG/DL (ref 5–25)
CALCIUM SERPL-MCNC: 9.4 MG/DL (ref 8.4–10.2)
CHLORIDE SERPL-SCNC: 93 MMOL/L (ref 96–108)
CO2 SERPL-SCNC: 36 MMOL/L (ref 21–32)
CREAT SERPL-MCNC: 2.45 MG/DL (ref 0.6–1.3)
GFR SERPL CREATININE-BSD FRML MDRD: 21 ML/MIN/1.73SQ M
GLUCOSE SERPL-MCNC: 188 MG/DL (ref 65–140)
POTASSIUM SERPL-SCNC: 2.5 MMOL/L (ref 3.5–5.3)
SODIUM SERPL-SCNC: 138 MMOL/L (ref 135–147)

## 2023-09-27 PROCEDURE — 80048 BASIC METABOLIC PNL TOTAL CA: CPT

## 2023-09-27 PROCEDURE — 36415 COLL VENOUS BLD VENIPUNCTURE: CPT
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
## 2023-09-27 NOTE — TELEPHONE ENCOUNTER
Raymond from the [facility] called with a critical lab result: potassium 2.5   Please review. Thank you.

## 2023-09-27 NOTE — TELEPHONE ENCOUNTER
Spoke to patient regarding most recent lab results from today showing potassium level significantly lower 2.5. Suspect secondary to recent inadvertently taking metolazone and ongoing diuresis. Advised her to take potassium chloride 40 meq tonight and then take 40 meq twice daily for next 2 days and then continue 40 meq daily afterwards (currently taking 20 meq daily)  Advised her to check BMP, magnesium on Monday, 10/2/2023. She is agreeable with this plan.

## 2023-09-28 ENCOUNTER — PATIENT OUTREACH (OUTPATIENT)
Dept: CASE MANAGEMENT | Facility: OTHER | Age: 58
End: 2023-09-28

## 2023-09-28 DIAGNOSIS — Z59.819 HOUSING INSECURITY: Primary | ICD-10-CM

## 2023-09-28 SDOH — ECONOMIC STABILITY - HOUSING INSECURITY: HOUSING INSTABILITY UNSPECIFIED: Z59.819

## 2023-09-28 NOTE — PROGRESS NOTES
OP.LUIS DE LEON received referral from Jerome HILL RN, CM regarding patient. Jerome has been following patient for a while and had just discovered that Daniel Rice is homeless. HALEY SMITH reached out to number listed after 12 pm today and was able to connect with Daniel Rice. She was in her car picking up her  from work so we were only able to talk briefly. She is open to HALEY SMITH and [facility] assisting with additional community resources regarding housing . Daniel Rice states she has been switching between staying at friends housing and sleeping in her car. She does have a friend in [place] that she stays with at times. Daniel Rice has been going to [employer], pantries and churches for food . She is interested in applying for food stamps. Daniel Rice is going to be going on disability next month, her income may then be over the limit, for her  works. Referral placed for [facility] regarding housing. Brenda to assist with housing list, [address]. In basket set. Social work assessment completed. Added to care team. Update to Jerome. HALEY SMITH will continue to follow & remain available as needed.

## 2023-10-04 ENCOUNTER — PATIENT OUTREACH (OUTPATIENT)
Dept: CASE MANAGEMENT | Facility: HOSPITAL | Age: 58
End: 2023-10-04

## 2023-10-04 ENCOUNTER — HOSPITAL ENCOUNTER (INPATIENT)
Facility: HOSPITAL | Age: 58
LOS: 2 days | Discharge: HOME | DRG: 194 | End: 2023-10-07
Attending: EMERGENCY MEDICINE | Admitting: INTERNAL MEDICINE
Payer: COMMERCIAL

## 2023-10-04 ENCOUNTER — APPOINTMENT (EMERGENCY)
Dept: RADIOLOGY | Facility: HOSPITAL | Age: 58
DRG: 194 | End: 2023-10-04
Payer: COMMERCIAL

## 2023-10-04 DIAGNOSIS — N17.9 AKI (ACUTE KIDNEY INJURY) (HCC): ICD-10-CM

## 2023-10-04 DIAGNOSIS — N18.4 STAGE 4 CHRONIC KIDNEY DISEASE (HCC): ICD-10-CM

## 2023-10-04 DIAGNOSIS — R07.9 CHEST PAIN, UNSPECIFIED TYPE: Primary | ICD-10-CM

## 2023-10-04 DIAGNOSIS — I50.33 ACUTE ON CHRONIC HEART FAILURE WITH PRESERVED EJECTION FRACTION (HCC): ICD-10-CM

## 2023-10-04 LAB
2HR DELTA HS TROPONIN: -8 NG/L
ALBUMIN SERPL BCP-MCNC: 3.8 G/DL (ref 3.5–5)
ALP SERPL-CCNC: 65 U/L (ref 34–104)
ALT SERPL W P-5'-P-CCNC: 19 U/L (ref 7–52)
ANION GAP SERPL CALCULATED.3IONS-SCNC: 11 MMOL/L
APTT PPP: 30 SECONDS (ref 23–37)
AST SERPL W P-5'-P-CCNC: 23 U/L (ref 13–39)
BASOPHILS # BLD AUTO: 0.03 THOUSANDS/ÂΜL (ref 0–0.1)
BASOPHILS NFR BLD AUTO: 1 % (ref 0–1)
BILIRUB SERPL-MCNC: 0.37 MG/DL (ref 0.2–1)
BILIRUB UR QL STRIP: NEGATIVE
BNP SERPL-MCNC: 588 PG/ML (ref 0–100)
BUN SERPL-MCNC: 40 MG/DL (ref 5–25)
CALCIUM SERPL-MCNC: 9 MG/DL (ref 8.4–10.2)
CARDIAC TROPONIN I PNL SERPL HS: 148 NG/L
CARDIAC TROPONIN I PNL SERPL HS: 156 NG/L
CHLORIDE SERPL-SCNC: 92 MMOL/L (ref 96–108)
CLARITY UR: CLEAR
CO2 SERPL-SCNC: 33 MMOL/L (ref 21–32)
COLOR UR: YELLOW
CREAT SERPL-MCNC: 3.49 MG/DL (ref 0.6–1.3)
EOSINOPHIL # BLD AUTO: 0.1 THOUSAND/ÂΜL (ref 0–0.61)
EOSINOPHIL NFR BLD AUTO: 2 % (ref 0–6)
ERYTHROCYTE [DISTWIDTH] IN BLOOD BY AUTOMATED COUNT: 15.2 % (ref 11.6–15.1)
GFR SERPL CREATININE-BSD FRML MDRD: 13 ML/MIN/1.73SQ M
GLUCOSE SERPL-MCNC: 116 MG/DL (ref 65–140)
GLUCOSE UR STRIP-MCNC: NEGATIVE MG/DL
HCT VFR BLD AUTO: 38.1 % (ref 34.8–46.1)
HGB BLD-MCNC: 11.9 G/DL (ref 11.5–15.4)
HGB UR QL STRIP.AUTO: NEGATIVE
IMM GRANULOCYTES # BLD AUTO: 0.01 THOUSAND/UL (ref 0–0.2)
IMM GRANULOCYTES NFR BLD AUTO: 0 % (ref 0–2)
INR PPP: 1.45 (ref 0.84–1.19)
KETONES UR STRIP-MCNC: NEGATIVE MG/DL
LEUKOCYTE ESTERASE UR QL STRIP: ABNORMAL
LIPASE SERPL-CCNC: 79 U/L (ref 11–82)
LYMPHOCYTES # BLD AUTO: 1.39 THOUSANDS/ÂΜL (ref 0.6–4.47)
LYMPHOCYTES NFR BLD AUTO: 25 % (ref 14–44)
MCH RBC QN AUTO: 26.2 PG (ref 26.8–34.3)
MCHC RBC AUTO-ENTMCNC: 31.2 G/DL (ref 31.4–37.4)
MCV RBC AUTO: 84 FL (ref 82–98)
MONOCYTES # BLD AUTO: 0.42 THOUSAND/ÂΜL (ref 0.17–1.22)
MONOCYTES NFR BLD AUTO: 7 % (ref 4–12)
NEUTROPHILS # BLD AUTO: 3.72 THOUSANDS/ÂΜL (ref 1.85–7.62)
NEUTS SEG NFR BLD AUTO: 65 % (ref 43–75)
NITRITE UR QL STRIP: NEGATIVE
NRBC BLD AUTO-RTO: 0 /100 WBCS
PH UR STRIP.AUTO: 5.5 [PH]
PLATELET # BLD AUTO: 179 THOUSANDS/UL (ref 149–390)
PMV BLD AUTO: 11.5 FL (ref 8.9–12.7)
POTASSIUM SERPL-SCNC: 2.6 MMOL/L (ref 3.5–5.3)
PROT SERPL-MCNC: 7.3 G/DL (ref 6.4–8.4)
PROT UR STRIP-MCNC: NEGATIVE MG/DL
PROTHROMBIN TIME: 17.9 SECONDS (ref 11.6–14.5)
RBC # BLD AUTO: 4.54 MILLION/UL (ref 3.81–5.12)
SARS-COV-2 RNA RESP QL NAA+PROBE: NEGATIVE
SODIUM SERPL-SCNC: 136 MMOL/L (ref 135–147)
SP GR UR STRIP.AUTO: <=1.005 (ref 1–1.03)
UROBILINOGEN UR QL STRIP.AUTO: 0.2 E.U./DL
WBC # BLD AUTO: 5.67 THOUSAND/UL (ref 4.31–10.16)

## 2023-10-04 PROCEDURE — 83735 ASSAY OF MAGNESIUM: CPT | Performed by: NURSE PRACTITIONER

## 2023-10-04 PROCEDURE — 85025 COMPLETE CBC W/AUTO DIFF WBC: CPT | Performed by: EMERGENCY MEDICINE

## 2023-10-04 PROCEDURE — 83690 ASSAY OF LIPASE: CPT | Performed by: EMERGENCY MEDICINE

## 2023-10-04 PROCEDURE — 36415 COLL VENOUS BLD VENIPUNCTURE: CPT | Performed by: EMERGENCY MEDICINE

## 2023-10-04 PROCEDURE — 71045 X-RAY EXAM CHEST 1 VIEW: CPT

## 2023-10-04 PROCEDURE — 87635 SARS-COV-2 COVID-19 AMP PRB: CPT | Performed by: EMERGENCY MEDICINE

## 2023-10-04 PROCEDURE — 99285 EMERGENCY DEPT VISIT HI MDM: CPT

## 2023-10-04 PROCEDURE — 85610 PROTHROMBIN TIME: CPT | Performed by: EMERGENCY MEDICINE

## 2023-10-04 PROCEDURE — 84484 ASSAY OF TROPONIN QUANT: CPT | Performed by: EMERGENCY MEDICINE

## 2023-10-04 PROCEDURE — 85730 THROMBOPLASTIN TIME PARTIAL: CPT | Performed by: EMERGENCY MEDICINE

## 2023-10-04 PROCEDURE — 80053 COMPREHEN METABOLIC PANEL: CPT | Performed by: EMERGENCY MEDICINE

## 2023-10-04 PROCEDURE — 99285 EMERGENCY DEPT VISIT HI MDM: CPT | Performed by: EMERGENCY MEDICINE

## 2023-10-04 PROCEDURE — 96365 THER/PROPH/DIAG IV INF INIT: CPT

## 2023-10-04 PROCEDURE — 99222 1ST HOSP IP/OBS MODERATE 55: CPT | Performed by: STUDENT IN AN ORGANIZED HEALTH CARE EDUCATION/TRAINING PROGRAM

## 2023-10-04 PROCEDURE — 83880 ASSAY OF NATRIURETIC PEPTIDE: CPT | Performed by: EMERGENCY MEDICINE

## 2023-10-04 PROCEDURE — 93005 ELECTROCARDIOGRAM TRACING: CPT

## 2023-10-04 PROCEDURE — 81001 URINALYSIS AUTO W/SCOPE: CPT | Performed by: EMERGENCY MEDICINE

## 2023-10-04 RX ORDER — POTASSIUM CHLORIDE 14.9 MG/ML
20 INJECTION INTRAVENOUS ONCE
Status: COMPLETED | OUTPATIENT
Start: 2023-10-04 | End: 2023-10-05

## 2023-10-04 RX ORDER — NITROGLYCERIN 0.4 MG/1
0.4 TABLET SUBLINGUAL ONCE
Status: DISCONTINUED | OUTPATIENT
Start: 2023-10-04 | End: 2023-10-04

## 2023-10-04 RX ORDER — FUROSEMIDE 10 MG/ML
40 INJECTION INTRAMUSCULAR; INTRAVENOUS ONCE
Status: DISCONTINUED | OUTPATIENT
Start: 2023-10-04 | End: 2023-10-05

## 2023-10-04 RX ORDER — NITROGLYCERIN 0.4 MG/1
0.4 TABLET SUBLINGUAL ONCE
Status: COMPLETED | OUTPATIENT
Start: 2023-10-04 | End: 2023-10-04

## 2023-10-04 RX ORDER — POTASSIUM CHLORIDE 20 MEQ/1
40 TABLET, EXTENDED RELEASE ORAL ONCE
Status: COMPLETED | OUTPATIENT
Start: 2023-10-04 | End: 2023-10-04

## 2023-10-04 RX ADMIN — NITROGLYCERIN 0.4 MG: 0.4 TABLET SUBLINGUAL at 22:27

## 2023-10-04 RX ADMIN — NITROGLYCERIN 1 INCH: 20 OINTMENT TOPICAL at 23:23

## 2023-10-04 RX ADMIN — POTASSIUM CHLORIDE 40 MEQ: 1500 TABLET, EXTENDED RELEASE ORAL at 23:20

## 2023-10-04 RX ADMIN — POTASSIUM CHLORIDE 20 MEQ: 14.9 INJECTION, SOLUTION INTRAVENOUS at 23:24
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
## 2023-10-05 ENCOUNTER — PATIENT OUTREACH (OUTPATIENT)
Dept: CASE MANAGEMENT | Facility: OTHER | Age: 58
End: 2023-10-05

## 2023-10-05 PROBLEM — R07.9 CHEST PAIN: Status: ACTIVE | Noted: 2023-10-05

## 2023-10-05 PROBLEM — R10.12 LEFT UPPER QUADRANT ABDOMINAL PAIN: Status: ACTIVE | Noted: 2023-10-05

## 2023-10-05 PROBLEM — R10.12 LEFT UPPER QUADRANT ABDOMINAL PAIN: Status: RESOLVED | Noted: 2023-10-05 | Resolved: 2023-10-05

## 2023-10-05 PROBLEM — I10 HTN (HYPERTENSION): Status: ACTIVE | Noted: 2023-10-05

## 2023-10-05 LAB
4HR DELTA HS TROPONIN: -12 NG/L
ANION GAP SERPL CALCULATED.3IONS-SCNC: 9 MMOL/L
BACTERIA UR QL AUTO: ABNORMAL /HPF
BASOPHILS # BLD AUTO: 0.03 THOUSANDS/ÂΜL (ref 0–0.1)
BASOPHILS NFR BLD AUTO: 1 % (ref 0–1)
BUN SERPL-MCNC: 44 MG/DL (ref 5–25)
CALCIUM SERPL-MCNC: 8.8 MG/DL (ref 8.4–10.2)
CARDIAC TROPONIN I PNL SERPL HS: 129 NG/L (ref 8–18)
CARDIAC TROPONIN I PNL SERPL HS: 144 NG/L
CHLORIDE SERPL-SCNC: 94 MMOL/L (ref 96–108)
CO2 SERPL-SCNC: 34 MMOL/L (ref 21–32)
CREAT SERPL-MCNC: 3.46 MG/DL (ref 0.6–1.3)
EOSINOPHIL # BLD AUTO: 0.13 THOUSAND/ÂΜL (ref 0–0.61)
EOSINOPHIL NFR BLD AUTO: 2 % (ref 0–6)
ERYTHROCYTE [DISTWIDTH] IN BLOOD BY AUTOMATED COUNT: 15 % (ref 11.6–15.1)
GFR SERPL CREATININE-BSD FRML MDRD: 13 ML/MIN/1.73SQ M
GLUCOSE SERPL-MCNC: 90 MG/DL (ref 65–140)
HCT VFR BLD AUTO: 36.2 % (ref 34.8–46.1)
HGB BLD-MCNC: 11.5 G/DL (ref 11.5–15.4)
IMM GRANULOCYTES # BLD AUTO: 0.01 THOUSAND/UL (ref 0–0.2)
IMM GRANULOCYTES NFR BLD AUTO: 0 % (ref 0–2)
LYMPHOCYTES # BLD AUTO: 1.38 THOUSANDS/ÂΜL (ref 0.6–4.47)
LYMPHOCYTES NFR BLD AUTO: 25 % (ref 14–44)
MAGNESIUM SERPL-MCNC: 2.1 MG/DL (ref 1.9–2.7)
MAGNESIUM SERPL-MCNC: 2.2 MG/DL (ref 1.9–2.7)
MCH RBC QN AUTO: 26.7 PG (ref 26.8–34.3)
MCHC RBC AUTO-ENTMCNC: 31.8 G/DL (ref 31.4–37.4)
MCV RBC AUTO: 84 FL (ref 82–98)
MONOCYTES # BLD AUTO: 0.44 THOUSAND/ÂΜL (ref 0.17–1.22)
MONOCYTES NFR BLD AUTO: 8 % (ref 4–12)
NEUTROPHILS # BLD AUTO: 3.49 THOUSANDS/ÂΜL (ref 1.85–7.62)
NEUTS SEG NFR BLD AUTO: 64 % (ref 43–75)
NON-SQ EPI CELLS URNS QL MICRO: ABNORMAL /HPF
NRBC BLD AUTO-RTO: 0 /100 WBCS
PLATELET # BLD AUTO: 160 THOUSANDS/UL (ref 149–390)
PMV BLD AUTO: 11.5 FL (ref 8.9–12.7)
POTASSIUM SERPL-SCNC: 3.4 MMOL/L (ref 3.5–5.3)
RBC # BLD AUTO: 4.31 MILLION/UL (ref 3.81–5.12)
RBC #/AREA URNS AUTO: ABNORMAL /HPF
SODIUM SERPL-SCNC: 137 MMOL/L (ref 135–147)
WBC # BLD AUTO: 5.48 THOUSAND/UL (ref 4.31–10.16)
WBC #/AREA URNS AUTO: ABNORMAL /HPF

## 2023-10-05 PROCEDURE — 36415 COLL VENOUS BLD VENIPUNCTURE: CPT | Performed by: EMERGENCY MEDICINE

## 2023-10-05 PROCEDURE — 80048 BASIC METABOLIC PNL TOTAL CA: CPT | Performed by: NURSE PRACTITIONER

## 2023-10-05 PROCEDURE — 84484 ASSAY OF TROPONIN QUANT: CPT | Performed by: EMERGENCY MEDICINE

## 2023-10-05 PROCEDURE — 83735 ASSAY OF MAGNESIUM: CPT | Performed by: NURSE PRACTITIONER

## 2023-10-05 PROCEDURE — 85025 COMPLETE CBC W/AUTO DIFF WBC: CPT | Performed by: NURSE PRACTITIONER

## 2023-10-05 PROCEDURE — 99255 IP/OBS CONSLTJ NEW/EST HI 80: CPT | Performed by: INTERNAL MEDICINE

## 2023-10-05 PROCEDURE — 99232 SBSQ HOSP IP/OBS MODERATE 35: CPT | Performed by: STUDENT IN AN ORGANIZED HEALTH CARE EDUCATION/TRAINING PROGRAM

## 2023-10-05 PROCEDURE — 84484 ASSAY OF TROPONIN QUANT: CPT | Performed by: NURSE PRACTITIONER

## 2023-10-05 RX ORDER — NICOTINE 21 MG/24HR
1 PATCH, TRANSDERMAL 24 HOURS TRANSDERMAL DAILY
Status: DISCONTINUED | OUTPATIENT
Start: 2023-10-05 | End: 2023-10-07 | Stop reason: HOSPADM

## 2023-10-05 RX ORDER — POTASSIUM CHLORIDE 20 MEQ/1
20 TABLET, EXTENDED RELEASE ORAL DAILY
Status: DISCONTINUED | OUTPATIENT
Start: 2023-10-05 | End: 2023-10-06

## 2023-10-05 RX ORDER — METOPROLOL SUCCINATE 50 MG/1
50 TABLET, EXTENDED RELEASE ORAL DAILY
Status: DISCONTINUED | OUTPATIENT
Start: 2023-10-05 | End: 2023-10-07 | Stop reason: HOSPADM

## 2023-10-05 RX ORDER — FERROUS GLUCONATE 324(38)MG
162 TABLET ORAL 3 TIMES WEEKLY
Status: DISCONTINUED | OUTPATIENT
Start: 2023-10-13 | End: 2023-10-05

## 2023-10-05 RX ORDER — ASPIRIN 325 MG
325 TABLET ORAL ONCE
Status: COMPLETED | OUTPATIENT
Start: 2023-10-05 | End: 2023-10-05

## 2023-10-05 RX ORDER — ACETAMINOPHEN 325 MG/1
650 TABLET ORAL EVERY 6 HOURS PRN
Status: DISCONTINUED | OUTPATIENT
Start: 2023-10-05 | End: 2023-10-07 | Stop reason: HOSPADM

## 2023-10-05 RX ORDER — TRAMADOL HYDROCHLORIDE 50 MG/1
25 TABLET ORAL EVERY 6 HOURS PRN
Status: DISCONTINUED | OUTPATIENT
Start: 2023-10-05 | End: 2023-10-07 | Stop reason: HOSPADM

## 2023-10-05 RX ORDER — FUROSEMIDE 10 MG/ML
80 INJECTION INTRAMUSCULAR; INTRAVENOUS
Status: DISCONTINUED | OUTPATIENT
Start: 2023-10-05 | End: 2023-10-07

## 2023-10-05 RX ORDER — FERROUS GLUCONATE 324(38)MG
162 TABLET ORAL 3 TIMES WEEKLY
Status: DISCONTINUED | OUTPATIENT
Start: 2023-10-06 | End: 2023-10-07 | Stop reason: HOSPADM

## 2023-10-05 RX ORDER — DICYCLOMINE HYDROCHLORIDE 10 MG/1
20 CAPSULE ORAL 4 TIMES DAILY PRN
Status: DISCONTINUED | OUTPATIENT
Start: 2023-10-05 | End: 2023-10-07 | Stop reason: HOSPADM

## 2023-10-05 RX ORDER — PANTOPRAZOLE SODIUM 40 MG/1
40 TABLET, DELAYED RELEASE ORAL
Status: DISCONTINUED | OUTPATIENT
Start: 2023-10-05 | End: 2023-10-07 | Stop reason: HOSPADM

## 2023-10-05 RX ORDER — POTASSIUM CHLORIDE 20 MEQ/1
40 TABLET, EXTENDED RELEASE ORAL ONCE
Status: COMPLETED | OUTPATIENT
Start: 2023-10-05 | End: 2023-10-05

## 2023-10-05 RX ORDER — ONDANSETRON 2 MG/ML
4 INJECTION INTRAMUSCULAR; INTRAVENOUS EVERY 6 HOURS PRN
Status: DISCONTINUED | OUTPATIENT
Start: 2023-10-05 | End: 2023-10-07 | Stop reason: HOSPADM

## 2023-10-05 RX ORDER — DOXAZOSIN 2 MG/1
2 TABLET ORAL
Status: DISCONTINUED | OUTPATIENT
Start: 2023-10-05 | End: 2023-10-07 | Stop reason: HOSPADM

## 2023-10-05 RX ADMIN — POTASSIUM CHLORIDE 40 MEQ: 1500 TABLET, EXTENDED RELEASE ORAL at 08:57

## 2023-10-05 RX ADMIN — TRAMADOL HYDROCHLORIDE 25 MG: 50 TABLET, COATED ORAL at 18:30

## 2023-10-05 RX ADMIN — NICOTINE 1 PATCH: 14 PATCH, EXTENDED RELEASE TRANSDERMAL at 08:57

## 2023-10-05 RX ADMIN — RIVAROXABAN 15 MG: 15 TABLET, FILM COATED ORAL at 17:24

## 2023-10-05 RX ADMIN — PANTOPRAZOLE SODIUM 40 MG: 40 TABLET, DELAYED RELEASE ORAL at 06:47

## 2023-10-05 RX ADMIN — TRAMADOL HYDROCHLORIDE 25 MG: 50 TABLET, COATED ORAL at 09:05

## 2023-10-05 RX ADMIN — ASPIRIN 325 MG: 325 TABLET ORAL at 03:04

## 2023-10-05 RX ADMIN — POTASSIUM CHLORIDE 40 MEQ: 1500 TABLET, EXTENDED RELEASE ORAL at 03:09

## 2023-10-05 RX ADMIN — DOXAZOSIN 2 MG: 2 TABLET ORAL at 21:34

## 2023-10-05 NOTE — ASSESSMENT & PLAN NOTE
· Baseline creatinine appears to be around 1.9 - 2.9 since 8/2023.   · Cr on admission 3.49  · Most likely cardiorenal syndrome  · Continue Lasix 80 IV twice daily  · Monitor creatinine function while on diuretics

## 2023-10-05 NOTE — ASSESSMENT & PLAN NOTE
Patient presents with sudden onset substernal chest pain and left low back pain around 5 PM while watching TV. Reports gained 5 pounds today and had fluids in the legs when waking up this morning. So she took Zaroxolyn 5 mg p.o. in addition to her Demadex 40 mg p.o. twice daily. Reports chest pain felt like when she had CHF in the past.  Reports chest pain went away after receiving medication in ED. · EKG showed paced rhythm  · Troponins mildly elevated and downtrending  ·   · Chest x-ray ? Fluid overload  · Suspect due to CHF exacerbation.   · Will give full dose aspirin x1  · Continue Xarelto  · Telemetry  · Consult cardiology

## 2023-10-05 NOTE — ASSESSMENT & PLAN NOTE
Patient presents with sudden onset substernal chest pain and left low back pain around 5 PM while watching TV. Reports gained 5 pounds today and had fluids in the legs when waking up this morning. So she took Zaroxolyn 5 mg p.o. in addition to her Demadex 40 mg p.o. twice daily. Reports chest pain felt like when she had CHF in the past.  Reports chest pain went away after receiving medication in ED. · EKG showed paced rhythm  · Troponins mildly elevated and downtrending  ·   · Chest x-ray showing fluid overload  · Suspect due to CHF exacerbation.   · S/P full dose aspirin x1  · Continue Xarelto  · Telemetry  · Consult cardiology  · Continues Xarelto and Toprol  · Plan for nuclear stress test

## 2023-10-05 NOTE — ASSESSMENT & PLAN NOTE
Wt Readings from Last 3 Encounters:   10/05/23 109 kg (239 lb 13.8 oz)   09/26/23 108 kg (237 lb)   09/16/23 109 kg (239 lb 10.2 oz)       · On Demadex 40mg p.o. twice daily and Zaroxolyn 5mg p.o. as needed for weight gain  · 2D echo August 2023 showed EF 70%. · Started on Lasix 80 IV twice daily starting in the morning due to severe hypokalemia.   · Chest x-ray and BNP as above  · Telemetry  · Cardiac diet, FR 1500 ml per day  · Daily weight, intake output  · Consult cardiology

## 2023-10-05 NOTE — ASSESSMENT & PLAN NOTE
· K 2.6  · Given K-Dur 40 p.o., KCl 20 IV in ED. Will order additional K-Dur 40 p.o.   · Telemetry  · Repeat lab in the morning

## 2023-10-05 NOTE — ASSESSMENT & PLAN NOTE
· S/P AICD implant  · EKG showed v-paced rhythm  · Continue metoprolol, Xarelto  · Optimize electrolytes
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
## 2023-10-05 NOTE — PROGRESS NOTES
[id number] [facility]  Progress Note  Name: Cinda Bruner  MRN: [id number]  Unit/Bed#: [address] [facility] I Date of Admission: 10/4/2023   Date of Service: 10/5/2023 I Hospital Day: 0    Assessment/Plan   * Chest pain  Assessment & Plan  Patient presents with sudden onset substernal chest pain and left low back pain around 5 PM while watching TV. Reports gained 5 pounds today and had fluids in the legs when waking up this morning. So she took Zaroxolyn 5 mg p.o. in addition to her Demadex 40 mg p.o. twice daily. Reports chest pain felt like when she had CHF in the past.  Reports chest pain went away after receiving medication in ED. · EKG showed paced rhythm  · Troponins mildly elevated and downtrending  ·   · Chest x-ray showing fluid overload  · Suspect due to CHF exacerbation. · S/P full dose aspirin x1  · Continue Xarelto  · Telemetry  · Consult cardiology  · Continues Xarelto and Toprol  · Plan for nuclear stress test      Acute on chronic diastolic congestive heart failure [facility])  Assessment & Plan  Wt Readings from Last 3 Encounters:   10/05/23 109 kg (239 lb 13.8 oz)   09/26/23 108 kg (237 lb)   09/16/23 109 kg (239 lb 10.2 oz)       · On Demadex 40mg p.o. twice daily and Zaroxolyn 5mg p.o. as needed for weight gain  · 2D echo August 2023 showed EF 70%. · Started on Lasix 80 IV twice daily starting in the morning due to severe hypokalemia. · Chest x-ray and BNP as above  · Telemetry  · Cardiac diet, FR 1500 ml per day  · Daily weight, intake output  · Consult cardiology    Paroxysmal A-fib [facility])  Assessment & Plan  · S/P AICD implant  · EKG showed v-paced rhythm  · Continue metoprolol, Xarelto  · Optimize electrolytes    Stage 4 chronic kidney disease (HCC)  Assessment & Plan  · Baseline creatinine appears to be around 1.9 - 2.9 since 8/2023.   · Cr on admission 3.49  · Most likely cardiorenal syndrome  · Continue Lasix 80 IV twice daily  · Monitor creatinine function while on diuretics    History of monomorphic ventricular tachycardia, s/p ICD in 2016  Assessment & Plan  · Status post AICD implant    Left upper quadrant abdominal pain-resolved as of 10/5/2023  Assessment & Plan  · Reports left upper quadrant abdominal pain for few days. Denies nausea vomiting diarrhea constipation fever chills. · We will try Bentyl as needed  · Monitor for pain  · Resolved               VTE Pharmacologic Prophylaxis:   Moderate Risk (Score 3-4) - Pharmacological DVT Prophylaxis Ordered: rivaroxaban (Xarelto). Patient Centered Rounds: I performed bedside rounds with nursing staff today. Discussions with Specialists or Other Care Team Provider: case management    Education and Discussions with Family / Patient: Patient declined call to . Total Time Spent on Date of Encounter in care of patient: 35 mins. This time was spent on one or more of the following: performing physical exam; counseling and coordination of care; obtaining or reviewing history; documenting in the medical record; reviewing/ordering tests, medications or procedures; communicating with other healthcare professionals and discussing with patient's family/caregivers. Current Length of Stay: 0 day(s)  Current Patient Status: Inpatient   Certification Statement: The patient will continue to require additional inpatient hospital stay due to chf exacerbation  Discharge Plan: Anticipate discharge in 48-72 hrs to discharge location to be determined pending rehab evaluations. Code Status: Level 1 - Full Code    Subjective:   Patient seen examined at bedside. States that her breathing is improved. Denies any fevers or chills. Objective:     Vitals:   Temp (24hrs), Av.2 °F (36.8 °C), Min:97.4 °F (36.3 °C), Max:99.6 °F (37.6 °C)    Temp:  [97.4 °F (36.3 °C)-99.6 °F (37.6 °C)] 98 °F (36.7 °C)  HR:  [58-72] 60  Resp:  [16-24] 16  BP: ()/(50-91) 94/59  SpO2:  [93 %-97 %] 93 %  Body mass index is 37.57 kg/m². Input and Output Summary (last 24 hours): Intake/Output Summary (Last 24 hours) at 10/5/2023 1650  Last data filed at 10/5/2023 0601  Gross per 24 hour   Intake 300 ml   Output 200 ml   Net 100 ml       Physical Exam:   Physical Exam  Vitals and nursing note reviewed. Constitutional:       General: She is not in acute distress. Appearance: She is well-developed. HENT:      Head: Normocephalic and atraumatic. Eyes:      Conjunctiva/sclera: Conjunctivae normal.   Cardiovascular:      Rate and Rhythm: Normal rate and regular rhythm. Heart sounds: Murmur heard. Pulmonary:      Effort: Pulmonary effort is normal.      Comments: Rales at bilateral lung bases  Abdominal:      Palpations: Abdomen is soft. Tenderness: There is no abdominal tenderness. Musculoskeletal:         General: No swelling. Cervical back: Neck supple. Skin:     General: Skin is warm and dry. Capillary Refill: Capillary refill takes less than 2 seconds. Neurological:      Mental Status: She is alert. Mental status is at baseline.    Psychiatric:         Mood and Affect: Mood normal.          Additional Data:     Labs:  Results from last 7 days   Lab Units 10/05/23  0536   WBC Thousand/uL 5.48   HEMOGLOBIN g/dL 11.5   HEMATOCRIT % 36.2   PLATELETS Thousands/uL 160   NEUTROS PCT % 64   LYMPHS PCT % 25   MONOS PCT % 8   EOS PCT % 2     Results from last 7 days   Lab Units 10/05/23  0536 10/04/23  2224   SODIUM mmol/L 137 136   POTASSIUM mmol/L 3.4* 2.6*   CHLORIDE mmol/L 94* 92*   CO2 mmol/L 34* 33*   BUN mg/dL 44* 40*   CREATININE mg/dL 3.46* 3.49*   ANION GAP mmol/L 9 11   CALCIUM mg/dL 8.8 9.0   ALBUMIN g/dL  --  3.8   TOTAL BILIRUBIN mg/dL  --  0.37   ALK PHOS U/L  --  65   ALT U/L  --  19   AST U/L  --  23   GLUCOSE RANDOM mg/dL 90 116     Results from last 7 days   Lab Units 10/04/23  2224   INR  1.45*                   Lines/Drains:  Invasive Devices     Peripheral Intravenous Line  Duration Peripheral IV 10/04/23 Dorsal (posterior); Left Hand <1 day                  Telemetry:  Telemetry Orders (From admission, onward)             24 Hour Telemetry Monitoring  Continuous x 24 Hours (Telem)        Question:  Reason for 24 Hour Telemetry  Answer:  PCI/EP study (including pacer and ICD implementation), Cardiac surgery, MI, abnormal cardiac cath, and chest pain- rule out MI                 Telemetry Reviewed: paced rhythm 61 bpm  Indication for Continued Telemetry Use: Acute CHF on >200 mg lasix/day or equivalent dose or with new reduced EF. Imaging: Reviewed radiology reports from this admission including: chest xray    Recent Cultures (last 7 days):         Last 24 Hours Medication List:   Current Facility-Administered Medications   Medication Dose Route Frequency Provider Last Rate   • acetaminophen  650 mg Oral Q6H PRN CHARLOTTE Bell     • dicyclomine  20 mg Oral 4x Daily PRN CHARLOTTE Bell     • doxazosin  2 mg Oral HS CHARLOTTE Bell     • [START ON 10/6/2023] ferrous gluconate  162 mg Oral Once per day on Mon Wed Fri CHARLOTTE Bell     • furosemide  80 mg Intravenous BID (diuretic) CHARLOTTE Bell     • metoprolol succinate  50 mg Oral Daily CHARLOTTE Bell     • nicotine  1 patch Transdermal Daily CHARLOTTE Bell     • ondansetron  4 mg Intravenous Q6H PRN CHARLOTTE Bell     • pantoprazole  40 mg Oral Early Morning CHARLOTTE Bell     • potassium chloride  20 mEq Oral Daily CHARLOTTE Bell     • rivaroxaban  15 mg Oral QPM CHARLOTTE Bell     • traMADol  25 mg Oral Q6H PRN CHARLOTTE Bell          Today, Patient Was Seen By: Laura Ellis DO    **Please Note: This note may have been constructed using a voice recognition system. **

## 2023-10-05 NOTE — CONSULTS
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  pharmacologic stress test was performed. Systolic blood pressure was 110 mmHg,  at the start of the study. Diastolic blood pressure was 70 mmHg, at the start  of the study. The heart rate was 55 bpm, at the start of the study. IV double  checked. Regadenoson protocol:  Time HR bpm SBP mmHg DBP mmHg Symptoms Rhythm/conduct  Baseline 09:00 57 110 70 none --  1 min 09:36 86 130 80 mild dyspnea --  2 min 09:37 100 120 70 subsiding, headache --  3 min 09:38 76 118 70 nausea rare PVC's  4 min 09:39 76 110 70 subsiding --  No medications or fluids given. STRESS SUMMARY: Duration of pharmacologic stress was 3 min. Maximal heart rate  during stress was 100 bpm. There was normal resting blood pressure with an  appropriate response to stress.  The rate-pressure product for the peak heart  rate and blood pressure was 86536. There was no chest pain during stress. The  stress test was terminated due to protocol completion. Pre oxygen saturation:  100 %. Peak oxygen saturation: 99 %. The stress ECG was negative for ischemia  and normal. There were no stress arrhythmias or conduction abnormalities. ISOTOPE ADMINISTRATION:  Resting isotope administration Stress isotope administration  Agent Tetrofosmin Tetrofosmin  Dose 11 mCi 32.2 mCi  Date 03/22/2016 03/22/2016  Injection time 07:45 09:25  Imaging time 08:30 10:55  Injection-image interval 45 min 90 min  Time from 1st injection -- 3.1 hours    The radiopharmaceutical was injected at the peak effect of pharmacologic  stress. MYOCARDIAL PERFUSION IMAGING:  The image quality was good. Rotating projection images reveal mild breast  attenuation. Left ventricular size was normal. The TID ratio was 1.32. Value is  likely overestimated due to small LV chamber size. PERFUSION DEFECTS:  -  There was a small to moderate, moderately severe, reversible myocardial  perfusion defect of the mid and apical anterior wall. GATED SPECT:  The calculated left ventricular ejection fraction was 55 %. Left ventricular  ejection fraction was within normal limits by visual estimate. There was no  left ventricular regional abnormality. SUMMARY:  -  Stress results: There was no chest pain during stress. -  ECG conclusions: The stress ECG was negative for ischemia and normal.  -  Perfusion imaging: There was a small to moderate, moderately severe,  reversible myocardial perfusion defect of the mid and apical anterior wall. -  Gated SPECT: The calculated left ventricular ejection fraction was 55 %. Left  ventricular ejection fraction was within normal limits by visual estimate. There was no left ventricular regional abnormality. IMPRESSIONS: Abnormal study after pharmacologic stress.  There was a small  amount of ischemia in the mid and apical anterior region. Left ventricular  systolic function was normal.    Prepared and signed by    Markell Valdivia DO  Signed 03/23/2016 14:58:45      Imaging: I have personally reviewed pertinent reports. XR chest 1 view portable    Result Date: 10/5/2023    Impression: Persistent mild pulmonary vascular congestion. EKG/ Monitor: Personally reviewed. Telemetry reviewed: Currently ventricular paced, 61 bpm.    10/04/23 EKG: Ventricular paced, 63 bpm.  Underlying atrial fibrillation/flutter.      Code Status: Level 1 - Full Code  Advance Directive and Living Will:      POLST:        Claire Vazquez PA-C

## 2023-10-05 NOTE — CASE MANAGEMENT
Case Management Assessment & Discharge Planning Note    Patient name Tami Andrade  Location [address] [place] [address] Drive [address]/ [address] [address] [facility] MRN [id number]  : 1965 Date 10/5/2023       Current Admission Date: 10/4/2023  Current Admission Diagnosis:Chest pain   Patient Active Problem List    Diagnosis Date Noted   • Chest pain 10/05/2023   • HTN (hypertension) 10/05/2023   • Benign hypertension with CKD (chronic kidney disease) stage IV ([address]) 2023   • Facial numbness, resolved 2023   • Thrombocytopenia ([address]) 2023   • Hypokalemia 2023   • Blood in stool 2023   • Abnormal finding on CT scan 2023   • Morbid obesity 2023   • Anemia in stage 4 chronic kidney disease  2023   • Left low back pain 2022   • Acute on chronic heart failure with preserved ejection fraction ([address]) 10/13/2022   • Renal cyst 2022   • Diverticulitis of large intestine without perforation or abscess 2022   • Leukopenia 2022   • Nephrolithiasis 2022   • Hepatitis C 2022   • Acute right flank pain 08/15/2021   • History of tachycardia induced cardiomyopathy 2021   • Paroxysmal A-fib ([address]) 2020   • Cocaine abuse ([address]) 2020   • Elevated lipase 10/10/2019   • Nonischemic nontraumatic myocardial injury due to CHF 2019   • Acute on chronic diastolic congestive heart failure ([address]) 2019   • Headache 2018   • Stage 4 chronic kidney disease ([address]) 2018   • Tobacco abuse 2018   • History of monomorphic ventricular tachycardia, s/p ICD in 2016 2016   • Gastroesophageal reflux disease  2016   • Atypical atrial flutter ([address]) 2016      LOS (days): 0  Geometric Mean LOS (GMLOS) (days):   Days to GMLOS:     OBJECTIVE:  PATIENT READMITTED TO HOSPITAL  Risk of Unplanned Readmission Score: 81.94         Current admission status: Inpatient     Preferred Pharmacy:   [address] [place] [address] #[id number] - NICHOLAS DECKER - [address] [place] [address] [place] [address]  Phone: [phone] Fax: [phone]    Archie Jo [address] [place] [address], [address]  [address]  Phone: [phone] Fax: [phone]    Primary Care Provider: George Carrillo MD    Primary Insurance: [employer]  Secondary Insurance:     ASSESSMENT:  [facility] Representative - Spouse   Primary Phone: [phone] ([place])  Home Phone: [phone]               Readmission Root Cause  30 Day Readmission: Yes  Who directed you to return to the hospital?: Self  Did you understand whom to contact if you had questions or problems?: Yes  Did you get your prescriptions before you left the hospital?: No  Reason[de-identified] Preference for own pharmacy  Were you able to get your prescriptions filled when you left the hospital?: Yes  Did you take your medications as prescribed?: Yes  Were you able to get to your follow-up appointments?: Yes  During previous admission, was a post-acute recommendation made?: No  Patient was readmitted due to: Chest pain  Action Plan: Medical Management    Patient Information  Admitted from[de-identified] Home  Mental Status: Alert  During Assessment patient was accompanied by: Not accompanied during assessment  Assessment information provided by[de-identified] Patient  Primary Caregiver: Self  Caregiver's Name[de-identified] Yakov Andre Relationship to Patient[de-identified] Significant Other  Caregiver's Telephone Number[de-identified] [phone]  Support Systems: Spouse/significant other  [place] of Residence: [address] do you live in?: [place]  Type of Current Residence: Homeless  In the last 12 months, was there a time when you did not have a steady place to sleep or slept in a shelter (including now)?: Yes  Homeless/housing insecurity resource given?: Yes (Information for shelters provided.  [address] for patient)  Living Arrangements: Lives w/ Spouse/significant other    Activities of Daily Living Prior to Admission  Functional Status: Independent  Completes ADLs independently?: Yes  Ambulates independently?: Yes  Does patient use assisted devices?: Yes  Assisted Devices (DME) used: Wheelchair, Clemetine Conn, Shower Chair, Bedside Commode  Does patient currently own DME?: Yes  What DME does the patient currently own?: Bedside Commode, Straight Cane, Walker, Wheelchair, Shower Chair  Does patient currently have [address]?: No     Patient Information Continued  Does patient have prescription coverage?: Yes  Within the past 12 months, you worried that your food would run out before you got the money to buy more.: Never true  Within the past 12 months, the food you bought just didn't last and you didn't have money to get more.: Never true  Food insecurity resource given?: N/A  Does patient receive dialysis treatments?: No     Means of Transportation  Means of Transport to Appts[de-identified] Drives Self  In the past 12 months, has lack of transportation kept you from medical appointments or from getting medications?: No  In the past 12 months, has lack of transportation kept you from meetings, work, or from getting things needed for daily living?: No  Was application for public transport provided?: N/A    DISCHARGE DETAILS:    Discharge planning discussed with[de-identified] Patient     Requested [address]         Is the patient interested in [address] at discharge?: No    DME Referral Provided  Referral made for DME?: No    Other Referral/Resources/Interventions Provided:  Interventions: [address], Shelter  Referral Comments: LUIS spoke with Pamela Sánchez outpatient cardiology care management nurse following patient in the community regarding patient's health concerns and homelessness. LUIS met with patient at bedside, introduced self and role, complete assessment and discharge planning.  Patient stated she lives with her  and has been independent with [address] [facility] & has been compliant with her medications and aware of the purpose of all her medications. CM infomed patient that CM spoke with Jerome and concerns were noted about patient's housing. Patient then stated that she has been living with friends and family for a while now and is looking for a shelter or permanent housing if available. [facility] that [address] patient with getting section 8 housing. Patient also informed that she had left  for [employer] but has not received any response. CM offered to call [employer] for patient. CM asked patient about [address] in [facility] and patient denied. CM called [facility] and was informed that patient needs to  have income of $650 per month or more to qualify for [facility]. CM informed patient regarding the same. Patient stated that joint income with her  is more than $650 per month. CM informed patient that she will need to call 211 and inform about her income and she should be connected with [employer]. Patient verbalized understanding. CM will continue to follow until discharge.      Treatment Team Recommendation: Home  Discharge Destination Plan[de-identified] Home  Transport at Discharge : Family

## 2023-10-05 NOTE — ASSESSMENT & PLAN NOTE
· Reports left upper quadrant abdominal pain for few days. Denies nausea vomiting diarrhea constipation fever chills.   · We will try Bentyl as needed  · Monitor for pain
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
## 2023-10-05 NOTE — ASSESSMENT & PLAN NOTE
· Reports left upper quadrant abdominal pain for few days. Denies nausea vomiting diarrhea constipation fever chills.   · We will try Bentyl as needed  · Monitor for pain  · Resolved

## 2023-10-05 NOTE — ASSESSMENT & PLAN NOTE
· Baseline creatinine appears to be around 1.9 - 2.9 since 8/2023. · Cr today 3.49  · ?  Cardiorenal  ·  IV lasix as above  · Consult Cardiology

## 2023-10-05 NOTE — UTILIZATION REVIEW
Initial Clinical Review    Admission: Date/Time/Statement:   Admission Orders (From admission, onward)     Ordered        10/05/23 0026  INPATIENT ADMISSION  Once                      Orders Placed This Encounter   Procedures   • INPATIENT ADMISSION     Standing Status:   Standing     Number of Occurrences:   1     Order Specific Question:   Level of Care     Answer:   Med Surg [16]     Order Specific Question:   Estimated length of stay     Answer:   More than 2 Midnights     Order Specific Question:   Certification     Answer:   I certify that inpatient services are medically necessary for this patient for a duration of greater than two midnights. See H&P and MD Progress Notes for additional information about the patient's course of treatment. ED Arrival Information     Expected   -    Arrival   10/4/2023 19:48    Acuity   Emergent            Means of arrival   Walk-In    Escorted by   Self    Service   Hospitalist    Admission type   Emergency            Arrival complaint   chest pain              Chief Complaint   Patient presents with   • Chest Pain     Chest and back pain for a couple hours. Hx of chf also retaining fluid in legs       Initial Presentation:   62 yof to ER from home c/o sudden onset substernal chest pain, back pain, increased fluid in legs today with 5# wt gain, LUQ discomfort. Reports took Zaroxolyn 5 mg p.o. in addition to her Demadex 40 mg p.o. twice daily. Hx CHF, CKD 4, A-fib, hypertension, V. tach, cardiomyopathy, hep C, cocaine abuse, GERD, obesity. Presents febrile, tachypneic, lungs with diminished breath sounds. Admission work-up showing hypokalemia, elevated troponin, BNP. Admitted to inpatient status for chest pain. Per cardio: Chest pain with mildly elevated cardiac troponin probable nonischemic myocardial injury with underlying hypertrophic cardiomyopathy and CKD. Cannot exclude ischemia. Continue Xarelto and Toprol, 80 mg of IV Lasix twice daily.  Monitor weight, intake/output and electrolytes. Replenish K & Mg prn. Oncologic nuclear stress test to rule out significant CAD    Date: 10/6/23   Day 2:   Cardio consulted, Nuclear stress testing planned for today. Remains on telemetry monitoring. Continue Xarelto & Toprol. Lungs with rales, remains on IV diuresis for CHF/fluid overload.        ED Triage Vitals   Temperature Pulse Respirations Blood Pressure SpO2   10/04/23 1955 10/04/23 1955 10/04/23 1955 10/04/23 1955 10/04/23 1955   99.6 °F (37.6 °C) 60 (!) 24 134/79 97 %      Temp Source Heart Rate Source Patient Position - Orthostatic VS BP Location FiO2 (%)   10/04/23 1955 10/04/23 2226 10/04/23 1955 10/04/23 1955 --   Tympanic Monitor Sitting Left arm       Pain Score       10/04/23 1955       8          Wt Readings from Last 1 Encounters:   10/05/23 109 kg (239 lb 13.8 oz)     Additional Vital Signs:   Date/Time Temp Pulse Resp BP MAP (mmHg) SpO2 O2 Device Patient Position - Orthostatic VS   10/05/23 0830 98.2 °F (36.8 °C) -- 21 93/50 64 Abnormal  95 % None (Room air) Sitting   10/05/23 0300 97.8 °F (36.6 °C) 68 20 104/62 -- 95 % None (Room air) Lying   10/05/23 0227 -- -- -- -- -- -- None (Room air) --   10/05/23 01:38:21 97.4 °F (36.3 °C) Abnormal  65 18 99/64 76 94 % -- --   10/05/23 0115 -- 72 22 151/86 110 96 % None (Room air) Lying   10/05/23 0045 -- 66 22 150/66 95 94 % None (Room air) Lying   10/05/23 0015 -- 62 19 133/78 100 97 % None (Room air) Lying   10/05/23 0000 -- 58 19 132/86 102 94 % None (Room air) Lying   10/04/23 2345 -- 58 18 132/91 107 94 % None (Room air) Lying   10/04/23 2258 -- 60 17 104/58 74 96 % None (Room air) Lying   10/04/23 2230 -- 58 17 110/70 83 95 % None (Room air) Lying   10/04/23 2226 -- 59 17 110/70 83 96 % None (Room air) Lying   10/04/23 1955 99.6 °F (37.6 °C) 60 24 Abnormal  134/79 -- 97 % None (Room air) Sitting       Pertinent Labs/Diagnostic Test Results:   10/6 NM myocardial perfusion spect (rx stress and/or rest)  •  No major reversible ischemia/perfusion defect noted. There is significant underlying artifact due to increased extracardiac tracer uptake. •  Stress ECG: The ECG was not diagnostic due to resting ST-T abnormalities due to AV pacing. •  Perfusion: Suboptimal imaging due to increased extracardiac tracer uptake. No major reversible ischemia/perfusion defect noted. •  Stress Function: Left ventricular function post-stress is normal.  Calculated ejection fraction is 55%. •  Perfusion Defect Conclusion: There is no evidence of transient ischemic dilation (TID).     XR chest 1 view portable   Final Result  (10/05 0557)      Persistent mild pulmonary vascular congestion        10/4 EKG:   V paced rhythm    Results from last 7 days   Lab Units 10/04/23  2301   SARS-COV-2  Negative     Results from last 7 days   Lab Units 10/05/23  0536 10/04/23  2056   WBC Thousand/uL 5.48 5.67   HEMOGLOBIN g/dL 11.5 11.9   HEMATOCRIT % 36.2 38.1   PLATELETS Thousands/uL 160 179   NEUTROS ABS Thousands/µL 3.49 3.72     Results from last 7 days   Lab Units 10/05/23  0536 10/04/23  2224   SODIUM mmol/L 137 136   POTASSIUM mmol/L 3.4* 2.6*   CHLORIDE mmol/L 94* 92*   CO2 mmol/L 34* 33*   ANION GAP mmol/L 9 11   BUN mg/dL 44* 40*   CREATININE mg/dL 3.46* 3.49*   EGFR ml/min/1.73sq m 13 13   CALCIUM mg/dL 8.8 9.0   MAGNESIUM mg/dL 2.2 2.1     Results from last 7 days   Lab Units 10/04/23  2224   AST U/L 23   ALT U/L 19   ALK PHOS U/L 65   TOTAL PROTEIN g/dL 7.3   ALBUMIN g/dL 3.8   TOTAL BILIRUBIN mg/dL 0.37     Results from last 7 days   Lab Units 10/05/23  0536 10/04/23  2224   GLUCOSE RANDOM mg/dL 90 116     Results from last 7 days   Lab Units 10/05/23  0104 10/04/23  2224 10/04/23  2056   HS TNI 0HR ng/L  --   --  156*   HS TNI 2HR ng/L  --  148*  --    HSTNI D2 ng/L  --  -8  --    HS TNI 4HR ng/L 144*  --   --    HSTNI D4 ng/L -12  --   --      Results from last 7 days   Lab Units 10/04/23  2224   PROTIME seconds 17.9*   INR  1.45*   PTT seconds 30     Results from last 7 days   Lab Units 10/04/23  2056   BNP pg/mL 588*     Results from last 7 days   Lab Units 10/04/23  2224   LIPASE u/L 79     Results from last 7 days   Lab Units 10/04/23  2326   CLARITY UA  Clear   COLOR UA  Yellow   SPEC GRAV UA  <=1.005   PH UA  5.5   GLUCOSE UA mg/dl Negative   KETONES UA mg/dl Negative   BLOOD UA  Negative   PROTEIN UA mg/dl Negative   NITRITE UA  Negative   BILIRUBIN UA  Negative   UROBILINOGEN UA E.U./dl 0.2   LEUKOCYTES UA  Small*   WBC UA /hpf 4-10*   RBC UA /hpf 0-1   BACTERIA UA /hpf Occasional   EPITHELIAL CELLS WET PREP /hpf Occasional     ED Treatment:   Medication Administration from 10/04/2023 1948 to 10/05/2023 0129       Date/Time Order Dose Route Action     10/04/2023 2227 EDT nitroglycerin (NITROSTAT) SL tablet 0.4 mg 0.4 mg Sublingual Given     10/04/2023 2323 EDT nitroglycerin (NITRO-BID) 2 % TD ointment 1 inch 1 inch Topical Given     10/04/2023 2320 EDT potassium chloride (K-DUR,KLOR-CON) CR tablet 40 mEq 40 mEq Oral Given     10/04/2023 2324 EDT potassium chloride 20 mEq IVPB (premix) 20 mEq Intravenous New Bag        Past Medical History:   Diagnosis Date   • ACS (acute coronary syndrome) ([address]) 02/07/2021   • Acute on chronic diastolic CHF 57/22/3438   • Anemia 01/14/2023   • Arthritis    • Atrial fibrillation with rapid ventricular response (HCC) 03/20/2016   • Breast lump    • CKD (chronic kidney disease) stage 3, GFR 30-59 ml/min ([facility])    • Disease of thyroid gland    • Elevated troponin 09/09/2019   • Epigastric abdominal tenderness 08/15/2021   • 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 ([address]) 05/25/2020   • GERD (gastroesophageal reflux disease)    • H/O transfusion 1987   • Hepatitis C     resolved   • History of tachycardia induced cardiomyopathy 05/2021    in setting of rapid atrial flutter in 05/2021 and again 06/2022   • History of ventricular tachycardia 07/2016    s/p ICD   • Hyperlipidemia    • Hypertension    • Hypokalemia 07/23/2023   • Inappropriate ICD discharge for atrial flutter 04/2023   • NSTEMI (non-ST elevated myocardial infarction) ([address]) 12/26/2018   • Sleep apnea     no cpap     Present on Admission:  • Tobacco abuse  • Stage 4 chronic kidney disease (HCC)  • Paroxysmal A-fib (HCC)  • Hypokalemia  • History of tachycardia induced cardiomyopathy  • Hepatitis C  • Gastroesophageal reflux disease   • Acute on chronic diastolic congestive heart failure (HCC)  • Cocaine abuse ([facility])    Admitting Diagnosis: Chest pain [R07.9]  DANIELA (acute kidney injury) ([address] ) [N17.9]  Chest pain, unspecified type [R07.9]  Acute on chronic heart failure with preserved ejection fraction ([facility]) [I50.33]  Age/Sex: 62 y.o. female  Admission Orders:  Cont pulse ox   Telemetry  Consult cardio    Scheduled Medications:  doxazosin, 2 mg, Oral, HS  [START ON 10/6/2023] ferrous gluconate, 162 mg, Oral, Once per day on Mon Wed Fri  furosemide, 80 mg, Intravenous, BID (diuretic)  metoprolol succinate, 50 mg, Oral, Daily  nicotine, 1 patch, Transdermal, Daily  pantoprazole, 40 mg, Oral, Early Morning  potassium chloride, 20 mEq, Oral, Daily  rivaroxaban, 15 mg, Oral, QPM    PRN Meds:  acetaminophen, 650 mg, Oral, Q6H PRN  dicyclomine, 20 mg, Oral, 4x Daily PRN  ondansetron, 4 mg, Intravenous, Q6H PRN  traMADol, 25 mg, Oral, Q6H PRN, 10/5 x2, 10/6 x1    Network Utilization Review Department  ATTENTION: Please call with any questions or concerns to [phone] and carefully listen to the prompts so that you are directed to the right person. All voicemails are confidential.   For Discharge needs, contact Care Management DC Support Team at [phone] opt. 2  Send all requests for admission clinical reviews, approved or denied determinations and any other requests to dedicated fax number below belonging to the campus where the patient is receiving treatment.  List of dedicated fax numbers for the Facilities:  [address] [facility] ADMISSION DENIALS (Administrative/Medical Necessity) [phone]   DISCHARGE SUPPORT TEAM (NETWORK) [address] [facility] (Maternity/NICU/Pediatrics) [address] 1000 [facility] [phone] 1505 [facility] [address] [id number] [facility] [address] [phone]

## 2023-10-06 ENCOUNTER — APPOINTMENT (INPATIENT)
Dept: RADIOLOGY | Facility: HOSPITAL | Age: 58
DRG: 194 | End: 2023-10-06
Payer: COMMERCIAL

## 2023-10-06 ENCOUNTER — APPOINTMENT (INPATIENT)
Dept: NON INVASIVE DIAGNOSTICS | Facility: HOSPITAL | Age: 58
DRG: 194 | End: 2023-10-06
Payer: COMMERCIAL

## 2023-10-06 ENCOUNTER — DOCUMENTATION (OUTPATIENT)
Dept: CARDIOLOGY CLINIC | Facility: CLINIC | Age: 58
End: 2023-10-06

## 2023-10-06 LAB
ALBUMIN SERPL BCP-MCNC: 3.4 G/DL (ref 3.5–5)
ANION GAP SERPL CALCULATED.3IONS-SCNC: 8 MMOL/L
BUN SERPL-MCNC: 45 MG/DL (ref 5–25)
CALCIUM ALBUM COR SERPL-MCNC: 8.8 MG/DL (ref 8.3–10.1)
CALCIUM SERPL-MCNC: 8.3 MG/DL (ref 8.4–10.2)
CHEST PAIN STATEMENT: NORMAL
CHLORIDE SERPL-SCNC: 96 MMOL/L (ref 96–108)
CO2 SERPL-SCNC: 32 MMOL/L (ref 21–32)
CREAT SERPL-MCNC: 3.02 MG/DL (ref 0.6–1.3)
ERYTHROCYTE [DISTWIDTH] IN BLOOD BY AUTOMATED COUNT: 15.1 % (ref 11.6–15.1)
GFR SERPL CREATININE-BSD FRML MDRD: 16 ML/MIN/1.73SQ M
GLUCOSE SERPL-MCNC: 125 MG/DL (ref 65–140)
HCT VFR BLD AUTO: 35.6 % (ref 34.8–46.1)
HGB BLD-MCNC: 11.2 G/DL (ref 11.5–15.4)
MAGNESIUM SERPL-MCNC: 2.2 MG/DL (ref 1.9–2.7)
MAX DIASTOLIC BP: 71 MMHG
MAX HEART RATE: 139 BPM
MAX HR PERCENT: 97 %
MAX HR: 76 BPM
MAX PREDICTED HEART RATE: 162 BPM
MAX. SYSTOLIC BP: 123 MMHG
MCH RBC QN AUTO: 26.8 PG (ref 26.8–34.3)
MCHC RBC AUTO-ENTMCNC: 31.5 G/DL (ref 31.4–37.4)
MCV RBC AUTO: 85 FL (ref 82–98)
PHOSPHATE SERPL-MCNC: 3.4 MG/DL (ref 2.7–4.5)
PLATELET # BLD AUTO: 122 THOUSANDS/UL (ref 149–390)
PMV BLD AUTO: 11.9 FL (ref 8.9–12.7)
POTASSIUM SERPL-SCNC: 3.2 MMOL/L (ref 3.5–5.3)
PROTOCOL NAME: NORMAL
RATE PRESSURE PRODUCT: 5952
RBC # BLD AUTO: 4.18 MILLION/UL (ref 3.81–5.12)
REASON FOR TERMINATION: NORMAL
SL CV REST NUCLEAR ISOTOPE DOSE: 15.7 MCI
SL CV STRESS NUCLEAR ISOTOPE DOSE: 45.5 MCI
SL CV STRESS RECOVERY BP: NORMAL MMHG
SL CV STRESS RECOVERY HR: 76 BPM
SL CV STRESS RECOVERY O2 SAT: 97 %
SODIUM SERPL-SCNC: 136 MMOL/L (ref 135–147)
STRESS ANGINA INDEX: 0
STRESS BASELINE BP: NORMAL MMHG
STRESS BASELINE HR: 60 BPM
STRESS O2 SAT REST: 95 %
STRESS PEAK HR: 64 BPM
STRESS POST ESTIMATED WORKLOAD: 1 METS
STRESS POST EXERCISE DUR MIN: 1 MIN
STRESS POST O2 SAT PEAK: 98 %
STRESS POST PEAK BP: 93 MMHG
TARGET HR FORMULA: NORMAL
TEST INDICATION: NORMAL
TIME IN EXERCISE PHASE: NORMAL
WBC # BLD AUTO: 3.56 THOUSAND/UL (ref 4.31–10.16)

## 2023-10-06 PROCEDURE — G1004 CDSM NDSC: HCPCS

## 2023-10-06 PROCEDURE — 99233 SBSQ HOSP IP/OBS HIGH 50: CPT | Performed by: STUDENT IN AN ORGANIZED HEALTH CARE EDUCATION/TRAINING PROGRAM

## 2023-10-06 PROCEDURE — 80069 RENAL FUNCTION PANEL: CPT | Performed by: STUDENT IN AN ORGANIZED HEALTH CARE EDUCATION/TRAINING PROGRAM

## 2023-10-06 PROCEDURE — 93017 CV STRESS TEST TRACING ONLY: CPT

## 2023-10-06 PROCEDURE — A9502 TC99M TETROFOSMIN: HCPCS

## 2023-10-06 PROCEDURE — 83735 ASSAY OF MAGNESIUM: CPT | Performed by: STUDENT IN AN ORGANIZED HEALTH CARE EDUCATION/TRAINING PROGRAM

## 2023-10-06 PROCEDURE — 85027 COMPLETE CBC AUTOMATED: CPT | Performed by: STUDENT IN AN ORGANIZED HEALTH CARE EDUCATION/TRAINING PROGRAM

## 2023-10-06 PROCEDURE — 99232 SBSQ HOSP IP/OBS MODERATE 35: CPT | Performed by: INTERNAL MEDICINE

## 2023-10-06 PROCEDURE — 93016 CV STRESS TEST SUPVJ ONLY: CPT | Performed by: INTERNAL MEDICINE

## 2023-10-06 PROCEDURE — 93005 ELECTROCARDIOGRAM TRACING: CPT

## 2023-10-06 PROCEDURE — 93018 CV STRESS TEST I&R ONLY: CPT | Performed by: INTERNAL MEDICINE

## 2023-10-06 PROCEDURE — 78452 HT MUSCLE IMAGE SPECT MULT: CPT | Performed by: INTERNAL MEDICINE

## 2023-10-06 PROCEDURE — 78452 HT MUSCLE IMAGE SPECT MULT: CPT

## 2023-10-06 RX ORDER — POTASSIUM CHLORIDE 20 MEQ/1
40 TABLET, EXTENDED RELEASE ORAL DAILY
Status: DISCONTINUED | OUTPATIENT
Start: 2023-10-06 | End: 2023-10-07 | Stop reason: HOSPADM

## 2023-10-06 RX ORDER — REGADENOSON 0.08 MG/ML
0.4 INJECTION, SOLUTION INTRAVENOUS ONCE
Status: COMPLETED | OUTPATIENT
Start: 2023-10-06 | End: 2023-10-06

## 2023-10-06 RX ORDER — POTASSIUM CHLORIDE 20 MEQ/1
40 TABLET, EXTENDED RELEASE ORAL
Status: DISPENSED | OUTPATIENT
Start: 2023-10-06 | End: 2023-10-06

## 2023-10-06 RX ADMIN — RIVAROXABAN 15 MG: 15 TABLET, FILM COATED ORAL at 18:14

## 2023-10-06 RX ADMIN — REGADENOSON 0.4 MG: 0.08 INJECTION, SOLUTION INTRAVENOUS at 09:28

## 2023-10-06 RX ADMIN — DOXAZOSIN 2 MG: 2 TABLET ORAL at 22:03

## 2023-10-06 RX ADMIN — TRAMADOL HYDROCHLORIDE 25 MG: 50 TABLET, COATED ORAL at 11:42

## 2023-10-06 RX ADMIN — TRAMADOL HYDROCHLORIDE 25 MG: 50 TABLET, COATED ORAL at 18:14

## 2023-10-06 RX ADMIN — METOPROLOL SUCCINATE 50 MG: 50 TABLET, EXTENDED RELEASE ORAL at 11:29

## 2023-10-06 RX ADMIN — FERROUS GLUCONATE 162 MG: 324 TABLET ORAL at 11:28

## 2023-10-06 RX ADMIN — POTASSIUM CHLORIDE 40 MEQ: 1500 TABLET, EXTENDED RELEASE ORAL at 11:29

## 2023-10-06 RX ADMIN — ACETAMINOPHEN 650 MG: 325 TABLET ORAL at 23:41

## 2023-10-06 RX ADMIN — FUROSEMIDE 80 MG: 10 INJECTION, SOLUTION INTRAMUSCULAR; INTRAVENOUS at 11:33

## 2023-10-06 RX ADMIN — TRAMADOL HYDROCHLORIDE 25 MG: 50 TABLET, COATED ORAL at 05:33

## 2023-10-06 RX ADMIN — FUROSEMIDE 80 MG: 10 INJECTION, SOLUTION INTRAMUSCULAR; INTRAVENOUS at 15:57

## 2023-10-06 RX ADMIN — PANTOPRAZOLE SODIUM 40 MG: 40 TABLET, DELAYED RELEASE ORAL at 05:33

## 2023-10-06 RX ADMIN — NICOTINE 1 PATCH: 14 PATCH, EXTENDED RELEASE TRANSDERMAL at 11:24

## 2023-10-06 NOTE — ASSESSMENT & PLAN NOTE
Patient presents with sudden onset substernal chest pain and left low back pain around 5 PM while watching TV. Reports gained 5 pounds today and had fluids in the legs when waking up this morning. So she took Zaroxolyn 5 mg p.o. in addition to her Demadex 40 mg p.o. twice daily. Reports chest pain felt like when she had CHF in the past.  Reports chest pain went away after receiving medication in ED. · EKG showed paced rhythm  · Troponins mildly elevated and downtrending  ·   · Chest x-ray showing fluid overload  · Suspect due to CHF exacerbation. · S/P full dose aspirin x1  · Continue Xarelto  · Telemetry  · Consult cardiology  · Continues Xarelto and Toprol  · Stress test 10/6/23  •  No major reversible ischemia/perfusion defect noted. There is significant underlying artifact due to increased extracardiac tracer uptake. •  Stress ECG: The ECG was not diagnostic due to resting ST-T abnormalities due to AV pacing. •  Perfusion: Suboptimal imaging due to increased extracardiac tracer uptake. No major reversible ischemia/perfusion defect noted. •  Stress Function: Left ventricular function post-stress is normal.  Calculated ejection fraction is 55%. •  Perfusion Defect Conclusion: There is no evidence of transient ischemic dilation (TID).   · Cleared for d/c by cardiology

## 2023-10-06 NOTE — PROGRESS NOTES
Progress Note - Cardiology   [facility]     Dg Navarro 62 y.o. female MRN: [id number]  : 1965  Unit/Bed#: [place] [address] Encounter: [id number]    Assessment and Plan:   1. Chest pain.     - Presented on 10/04/23 for evaluation for chest pain patient reports sudden onset of chest pain yesterday evening while watching TV. She reports chest pain localized to the center of her chest without radiation. Chest pain is associated with shortness of breath. Patient also reports 5 pound weight gain over 1 day. States that she has been compliant with her medications. - 10/04/23 EKG: Ventricular paced, 63 bpm.  Underlying atrial fibrillation/flutter. - 10/4/23 hs troponin: 156 (0hrs), 148 (2hrs), 144 (4hrs).   - 10/5/23 hs troponin random: 129.   - Patient reports her chest pain has resolved since admission.  - Will plan for nuclear stress test today, 10/06/2023.     2. Elevated troponin.     - 10/4/23 hs troponin: 156 (0hrs), 148 (2hrs), 144 (4hrs).   - 10/5/23 hs troponin random: 129.   - 10/04/23 EKG: Ventricular paced, 63 bpm.  Underlying atrial fibrillation/flutter. - Possibly nonischemic myocardial injury secondary to acute on chronic diastolic heart failure and CKD stage IV however will need additional cardiac testing to determine etiology. - Will plan for nuclear stress test today, 10/06/2023.     3. Acute on chronic diastolic heart failure.     - Patient reports 5 pound weight gain in 24 hours on admission.  - 10/04/23 BNP: 588.   - 10/04/23 CXR: Persistent mild pulmonary vascular congestion.   - 23 TTE: LVEF 65%. Systolic function is normal. Wall motion is normal. Diastolic function is normal.  Right ventricle systolic function normal.  Mitral valve with trace regurgitation.  - Currently on Cardura 2 mg daily and Toprol-XL 50 mg daily. - Currently on Lasix 80 mg IV twice daily.   Need to closely monitor creatinine given history of CKD stage IV and elevated creatinine on admission.  - Strict I/Os. - Daily weight, standing scale. - CHF education.   - Continue low sodium diet with 1500 mL fluid restriction.  - Monitor electrolytes. Replete potassium above 4 and magnesium above 2.      4. VT s/p dual-chamber ICD.    - s/p dual-chamber ICD ([employer]) in 2016.   - 6/19/23 cardiac EP device report: Battery voltage adequate, 17 months. AP 0%,  98%. (>40%/VVI 80 BPM). Lead parameters within normal limits. 73 AT/A-fib episodes with longest greater than 23 hours. AT/A-fib burden 41.1%.      5. Paroxysmal atrial flutter.       - s/p catheter ablation (6/22/22) and s/p AV miguel junctional ablation (5/10/23). - Telemetry reviewed: Currently AV paced, 61 bpm.  - 10/04/23 EKG: Ventricular paced, 63 bpm.  Underlying atrial fibrillation/flutter. - Currently on Cardura 2 mg daily and Toprol-XL 50 mg daily. - [id number] stroke risk score: 4 points, moderate-high risk. - Continue Xarelto 15 mg daily.     6. Hypertension.      - SBP over 24 hours . - Currently on Cardura 2 mg daily, Toprol-XL 50 mg daily, and Lasix 80 mg IV twice daily. - Continue to monitor BP.      7. Hypokalemia.     - 10/04/23 (admission) potassium: 2.6.   - 10/05/23 potassium: 3.4.   - 10/06/23 potassium: 3.2.   - Continue to monitor electrolytes. Replete potassium above 4.     8. CKD stage IV.     - Baseline creatinine between 1.9 and 2.3.  - 10/04/23 (admission) creatinine: 3.49.   - Continue to trend creatinine.     9. Obstructive sleep apnea.     - Reports compliance with CPAP.  - Recommend ordering CPAP inpatient.     10. Nicotine abuse.     - Discussed the importance of smoking cessation.  - Nicotine patch ordered. Subjective / Objective:         No acute events. Patient scheduled for nuclear stress test today, patient is agreeable. She reports that her chest pain has resolved since yesterday. She reports mild shortness of breath but states it has improved since admission.   She denies palpitations, lightheadedness, dizziness, headache, nausea, or vomiting. Vitals: Blood pressure 120/70, pulse 60, temperature 98.4 °F (36.9 °C), temperature source Oral, resp. rate 16, height 5' 7" (1.702 m), weight 111 kg (243 lb 13.3 oz), SpO2 91 %, not currently breastfeeding. Vitals:    10/05/23 0600 10/06/23 0745   Weight: 109 kg (239 lb 13.8 oz) 111 kg (243 lb 13.3 oz)     Body mass index is 38.19 kg/m². BP Readings from Last 3 Encounters:   10/06/23 120/70   09/26/23 110/68   09/17/23 143/87     Orthostatic Blood Pressures    Flowsheet Row Most Recent Value   Blood Pressure 120/70 filed at 10/06/2023 0745   Patient Position - Orthostatic VS Lying filed at 10/06/2023 0745        I/O       10/04 0701  10/05 0700 10/05 0701  10/06 0700 10/06 0701  10/07 0700    P. O. 300 237     Total Intake(mL/kg) 300 (2.8) 237 (2.2)     Urine (mL/kg/hr) 200 550 (0.2)     Total Output 200 550     Net +100 -313                Invasive Devices     Peripheral Intravenous Line  Duration           Peripheral IV 10/04/23 Dorsal (posterior); Left Hand 1 day                  Intake/Output Summary (Last 24 hours) at 10/6/2023 0854  Last data filed at 10/5/2023 1946  Gross per 24 hour   Intake 237 ml   Output 550 ml   Net -313 ml         Physical Exam:   Physical Exam  Vitals reviewed. Constitutional:       General: She is not in acute distress. Appearance: She is obese. She is ill-appearing. Cardiovascular:      Rate and Rhythm: Normal rate and regular rhythm. Pulses: Normal pulses. Heart sounds: Murmur heard. Pulmonary:      Effort: Pulmonary effort is normal. No respiratory distress. Abdominal:      General: Abdomen is flat. There is no distension. Palpations: Abdomen is soft. Tenderness: There is no abdominal tenderness. Musculoskeletal:      Right lower leg: No edema. Left lower leg: No edema. Skin:     General: Skin is warm and dry.    Neurological:      Mental Status: She is alert and oriented to person, place, and time.        Medications/ Allergies:     Current Facility-Administered Medications   Medication Dose Route Frequency Provider Last Rate   • acetaminophen  650 mg Oral Q6H PRN CHARLOTTE Bell     • dicyclomine  20 mg Oral 4x Daily PRN CHARLOTTE Bell     • doxazosin  2 mg Oral HS CHARLOTTE Bell     • ferrous gluconate  162 mg Oral Once per day on Mon Wed Fri CHARLOTTE Bell     • furosemide  80 mg Intravenous BID (diuretic) CHARLOTTE Bell     • metoprolol succinate  50 mg Oral Daily CHARLOTTE Bell     • nicotine  1 patch Transdermal Daily CHARLOTTE Bell     • ondansetron  4 mg Intravenous Q6H PRN CHARLOTTE Bell     • pantoprazole  40 mg Oral Early Morning CHARLOTTE Bell     • potassium chloride  20 mEq Oral Daily CHARLOTTE Bell     • potassium chloride  40 mEq Oral TID With Meals Antonette Luna DO     • rivaroxaban  15 mg Oral QPM CHARLOTTE Bell     • traMADol  25 mg Oral Q6H PRN CHARLOTTE Bell       acetaminophen, 650 mg, Q6H PRN  dicyclomine, 20 mg, 4x Daily PRN  ondansetron, 4 mg, Q6H PRN  traMADol, 25 mg, Q6H PRN      Allergies   Allergen Reactions   • Coconut Oil - Food Allergy Hives   • Iodinated Contrast Media Hives   • Tape  [Medical Tape] Hives       VTE Pharmacologic Prophylaxis:   Xarelto    Labs:   Troponins:  Results from last 7 days   Lab Units 10/05/23  0728 10/05/23  0104 10/04/23  2224   HS TNI RAND ng/L 129*  --   --    HSTNI D2 ng/L  --   --  -8   HSTNI D4 ng/L  --  -12  --          CBC with diff:  Results from last 7 days   Lab Units 10/06/23  0529 10/05/23  0536 10/04/23  2056   WBC Thousand/uL 3.56* 5.48 5.67   HEMOGLOBIN g/dL 11.2* 11.5 11.9   HEMATOCRIT % 35.6 36.2 38.1   MCV fL 85 84 84   PLATELETS Thousands/uL 122* 160 179   RBC Million/uL 4.18 4.31 4.54   MCH pg 26.8 26.7* 26.2*   MCHC g/dL 31.5 31.8 31.2*   RDW % 15.1 15.0 15.2*   MPV fL 11.9 11.5 11.5   NRBC AUTO /100 WBCs  --  0 0       CMP:  Results from last 7 days   Lab Units 10/06/23  0529 10/05/23  0536 10/04/23  2224   SODIUM mmol/L 136 137 136   POTASSIUM mmol/L 3.2* 3.4* 2.6*   CHLORIDE mmol/L 96 94* 92*   CO2 mmol/L 32 34* 33*   ANION GAP mmol/L 8 9 11   BUN mg/dL 45* 44* 40*   CREATININE mg/dL 3.02* 3.46* 3.49*   CALCIUM mg/dL 8.3* 8.8 9.0   AST U/L  --   --  23   ALT U/L  --   --  19   ALK PHOS U/L  --   --  65   TOTAL PROTEIN g/dL  --   --  7.3   ALBUMIN g/dL 3.4*  --  3.8   TOTAL BILIRUBIN mg/dL  --   --  0.37   EGFR ml/min/1.73sq m 16 13 13       Magnesium:  Results from last 7 days   Lab Units 10/06/23  0529 10/05/23  0536 10/04/23  2224   MAGNESIUM mg/dL 2.2 2.2 2.1     Coags:  Results from last 7 days   Lab Units 10/04/23  2224   PTT seconds 30   INR  1.45*     Imaging & Testing   I have personally reviewed pertinent reports. XR chest 1 view portable     Result Date: 10/5/2023     Impression: Persistent mild pulmonary vascular congestion. EKG / Monitor: Personally reviewed. Telemetry reviewed: Currently AV paced, ventricular rate 61 bpm.  Beats of NSVT noted at 1732 hrs on 10/05. Cardiac testing:     Echo follow up/limited  Result date: 8/09/23  Left Ventricle Left ventricular cavity size is normal. Wall thickness is moderately increased. There is moderate asymmetric hypertrophy of the apical and septal wall. The left ventricular ejection fraction is 70%. Systolic function is normal.      Right Ventricle Systolic function is normal.      Pericardium There is no pericardial effusion.         Echo complete with contrast if indicated  Result date: 5/12/23     Left Ventricle Left ventricular cavity size is normal. Wall thickness is moderately increased. There is moderate asymmetric hypertrophy of the septal wall. The left ventricular ejection fraction is 65%.  Systolic function is normal.  Wall motion is normal. Diastolic function is normal.      Right Ventricle Right ventricular cavity size is normal. Systolic function is normal. Wall thickness is normal. A pacer wire is present. Left Atrium The atrium is normal in size. Right Atrium The atrium is normal in size. Aortic Valve The aortic valve is trileaflet. The leaflets are not thickened. The leaflets are not calcified. The leaflets exhibit normal mobility. There is no evidence of regurgitation. The aortic valve has no significant stenosis. Mitral Valve Mitral valve structure is normal. There is trace regurgitation. There is no evidence of stenosis. Tricuspid Valve Tricuspid valve structure is normal. There is no evidence of regurgitation. There is no evidence of stenosis. Pulmonic Valve Pulmonic valve structure is normal. There is no evidence of regurgitation. There is no evidence of stenosis. Ascending Aorta The aortic root is normal in size. IVC/SVC The inferior vena cava is normal in size. Pericardium There is a trivial pericardial effusion anterior to the heart. There is no echocardiographic evidence of tamponade. The pericardium is normal in appearance.           Results for orders placed during the hospital encounter of 21    Echo complete with contrast if indicated    Narrative  [address] [place] [address]    Transthoracic Echocardiogram  2D, M-mode, Doppler, and Color Doppler    Study date:  25-May-2021    Patient: Mariana Montgomery  MR number: [id number]  Account number: [de-identified]  : 1965  Age: 64 years  Gender: Female  Status: Inpatient  Location: [facility]  Height: 67 in  Weight: 212 lb  BP: 118/ 62 mmHg    Indications: Afib    Diagnoses: I48.0 - Atrial fibrillation    Sonographer:  PRISCILLA Bianchi  Referring Physician:  Olga Vincent MD  Group:  [facility]  Interpreting Physician:  Vidhya Cottrell MD    SUMMARY    LEFT VENTRICLE:  Systolic function was moderately to markedly reduced. Ejection fraction was estimated to be 30 %.   There was moderate diffuse hypokinesis. There was severe concentric hypertrophy. There was significant hypertrophy of the LV apex. RIGHT VENTRICLE:  The size was normal.  Systolic function was reduced. LEFT ATRIUM:  The atrium was dilated. HISTORY: PRIOR HISTORY: Cocaine abuse, Smoker, CKD III, Congestive heart failure. Hypertrophic cardiomyopathy. Atrial fibrillation. Risk factors: hypercholesterolemia. PRIOR PROCEDURES: ICD implantation. Arrhythmia ablation. PROCEDURE: The study was performed in the [facility]. This was a routine study. The transthoracic approach was used. The study included complete 2D imaging, M-mode, complete spectral Doppler, and color Doppler. The heart rate was 138  bpm, at the start of the study. Images were obtained from the parasternal, apical, subcostal, and suprasternal notch acoustic windows. Intravenous contrast ( .6 mL Definity in NSS) was administered. Echocardiographic views were limited due  to poor acoustic window availability and lung interference. This was a technically difficult study. LEFT VENTRICLE: Size was normal. Systolic function was moderately to markedly reduced. Ejection fraction was estimated to be 30 %. There was moderate diffuse hypokinesis. Wall thickness was markedly increased. There was severe concentric  hypertrophy. There was significant hypertrophy of the LV apex. DOPPLER: Due to tachycardia, there was fusion of early and atrial contributions to ventricular filling. The study was not technically sufficient to allow evaluation of LV  diastolic function. RIGHT VENTRICLE: The size was normal. Systolic function was reduced. Wall thickness was normal. A pacing wire was present. LEFT ATRIUM: The atrium was dilated. RIGHT ATRIUM: Size was normal. A pacing wire was present. MITRAL VALVE: Valve structure was normal. There was normal leaflet separation. DOPPLER: The transmitral velocity was within the normal range. There was no evidence for stenosis.  There was trace regurgitation. AORTIC VALVE: The valve was trileaflet. Leaflets exhibited normal thickness and normal cuspal separation. DOPPLER: Transaortic velocity was within the normal range. There was no evidence for stenosis. There was no significant  regurgitation. TRICUSPID VALVE: The valve structure was normal. There was normal leaflet separation. DOPPLER: The transtricuspid velocity was within the normal range. There was no evidence for stenosis. There was trace regurgitation. Pulmonary artery  systolic pressure was within the normal range. Estimated peak PA pressure was 21 mmHg. PULMONIC VALVE: Leaflets exhibited normal thickness, no calcification, and normal cuspal separation. DOPPLER: The transpulmonic velocity was within the normal range. There was trace regurgitation. PERICARDIUM: There was no pericardial effusion. The pericardium was normal in appearance. AORTA: The root exhibited normal size. SYSTEMIC VEINS: IVC: The inferior vena cava was normal in size.  Respirophasic changes were normal.    SYSTEM MEASUREMENT TABLES    2D  %FS: 27.49 %  Ao Diam: 2.77 cm  EDV(Teich): 57.94 ml  EF(Teich): 54.26 %  ESV(Teich): 26.5 ml  IVSd: 2.46 cm  LA Area: 26.06 cm2  LA Diam: 4.27 cm  LVIDd: 3.7 cm  LVIDs: 2.68 cm  LVPWd: 1.79 cm  RA Area: 18.49 cm2  RVIDd: 3.01 cm  RWT: 0.97  SV(Teich): 31.44 ml    CW  TR Vmax: 2.14 m/s  TR maxP.28 mmHg    MM  TAPSE: 1.51 cm    [employer] Accredited Echocardiography Laboratory    Prepared and electronically signed by    Emmanuel Land MD  Signed  14:44:53    Results for orders placed during the hospital encounter of 20    [facility]    Narrative  [address]  [phone]    Transesophageal Echocardiogram  2D, Doppler, and Color Doppler    Study date:  20-May-2020    Patient: Sandhya Lafleur  MR number: [id number]  Account number: [de-identified]  : 1965  Age: 54 years  Gender: Female  Status: Outpatient  Location: Cath lab  Height: 67 in  Weight: 221 lb  BP:    Indications: AFIB    Diagnoses: I48.0 - Atrial fibrillation    Sonographer:  PRISCILLA Nichols  Interpreting Physician:  Masoud King MD  Primary Physician:  Enriqueta Brannon MD  Referring Physician:  Masoud King MD  Group:  [facility]    SUMMARY    LEFT VENTRICLE:  Systolic function was normal. Ejection fraction was estimated to be 60 %. Wall thickness was moderately increased. There was moderate concentric hypertrophy. LEFT ATRIUM:  The atrium was mildly dilated. LEFT ATRIAL APPENDAGE:  The size was normal.  The function was normal (normal emptying velocity). No thrombus was identified. ATRIAL SEPTUM:  No defect or patent foramen ovale was identified. HISTORY: PRIOR HISTORY: AFIB, PPM, MI, HOCM, HTN, HLD, CKD III, Smoker, Cocaine abuse    PROCEDURE: The procedure was performed in the catheterization laboratory. This was a routine study. The risks and alternatives of the procedure were explained to the patient and informed consent was obtained. The transesophageal approach  was used. The study included complete 2D imaging, complete spectral Doppler, and color Doppler. An adult omniplane probe was inserted by the attending cardiologist. Intubated with ease. One intubation attempt(s). There was no blood  detected on the probe. Image quality was adequate. MEDICATIONS: Anesthesia. LEFT VENTRICLE: Size was normal. Systolic function was normal. Ejection fraction was estimated to be 60 %. Wall thickness was moderately increased. There was moderate concentric hypertrophy. RIGHT VENTRICLE: The size was normal. Systolic function was normal. Wall thickness was normal. A pacing wire was present. LEFT ATRIUM: The atrium was mildly dilated. No mass was present. There was no spontaneous echo contrast ("smoke"). APPENDAGE: The size was normal. No thrombus was identified.  DOPPLER: The function was normal (normal emptying velocity). ATRIAL SEPTUM: No defect or patent foramen ovale was identified. RIGHT ATRIUM: Size was normal. No thrombus was identified. MITRAL VALVE: Valve structure was normal. There was normal leaflet separation. There was no echocardiographic evidence of vegetation. DOPPLER: There was no regurgitation. AORTIC VALVE: The valve was trileaflet. Leaflets exhibited normal thickness and normal cuspal separation. There was no echocardiographic evidence of vegetation. DOPPLER: There was no regurgitation. TRICUSPID VALVE: The valve structure was normal. There was normal leaflet separation. There was no echocardiographic evidence of vegetation. DOPPLER: There was no regurgitation. PERICARDIUM: There was no pericardial effusion. The pericardium was normal in appearance. [facility] Accredited Echocardiography Laboratory    Prepared and electronically signed by    Lauren Gonzalez MD  Signed [id number] 09:25:57      Results for orders placed during the hospital encounter of 16    NM myocardial perfusion spect (rx stress and/or rest)    [address]  [phone]    [facility]    Patient: Jong Royal  MR number: [id number]  Account number: [de-identified]  : 1965  Age: 46 years  Gender: Female  Status: Outpatient  Location: Stress lab  Height: 67 in  Weight: 227 lb  BP: 110/ 70 mmHg    Allergies: IODINATED DIAGNOSTIC AGENTS, TRAMADOL    Diagnosis: R07.9 - Chest pain, unspecified    RN:  Fercho Villanueva RN  Group:  [facility]  Report Prepared By[de-identified]  Fercho Villanueva RN  Interpreting Physician:  Erma Szymanski DO    INDICATIONS: Detection of coronary artery disease. HISTORY: The patient is a 46year old  female. Chest pain status:  chest pain. Coronary artery disease risk factors: hypertension and smoking.   Medications: a beta blocker, a calcium channel blocker, and aspirin. PHYSICAL EXAM: Baseline physical exam screening: no wheezes audible. REST ECG: Normal sinus rhythm. The ECG showed rare premature ventricular  contractions. Voltage criteria for left ventricular hypertrophy were present. T  wave inversions were moderate. PROCEDURE: The study was performed in the stress lab. A regadenoson infusion  pharmacologic stress test was performed. Systolic blood pressure was 110 mmHg,  at the start of the study. Diastolic blood pressure was 70 mmHg, at the start  of the study. The heart rate was 55 bpm, at the start of the study. IV double  checked. Regadenoson protocol:  Time HR bpm SBP mmHg DBP mmHg Symptoms Rhythm/conduct  Baseline 09:00 57 110 70 none --  1 min 09:36 86 130 80 mild dyspnea --  2 min 09:37 100 120 70 subsiding, headache --  3 min 09:38 76 118 70 nausea rare PVC's  4 min 09:39 76 110 70 subsiding --  No medications or fluids given. STRESS SUMMARY: Duration of pharmacologic stress was 3 min. Maximal heart rate  during stress was 100 bpm. There was normal resting blood pressure with an  appropriate response to stress. The rate-pressure product for the peak heart  rate and blood pressure was 10465. There was no chest pain during stress. The  stress test was terminated due to protocol completion. Pre oxygen saturation:  100 %. Peak oxygen saturation: 99 %. The stress ECG was negative for ischemia  and normal. There were no stress arrhythmias or conduction abnormalities. ISOTOPE ADMINISTRATION:  Resting isotope administration Stress isotope administration  Agent Tetrofosmin Tetrofosmin  Dose 11 mCi 32.2 mCi  Date 03/22/2016 03/22/2016  Injection time 07:45 09:25  Imaging time 08:30 10:55  Injection-image interval 45 min 90 min  Time from 1st injection -- 3.1 hours    The radiopharmaceutical was injected at the peak effect of pharmacologic  stress. MYOCARDIAL PERFUSION IMAGING:  The image quality was good.  Rotating projection images reveal mild breast  attenuation. Left ventricular size was normal. The TID ratio was 1.32. Value is  likely overestimated due to small LV chamber size. PERFUSION DEFECTS:  -  There was a small to moderate, moderately severe, reversible myocardial  perfusion defect of the mid and apical anterior wall. GATED SPECT:  The calculated left ventricular ejection fraction was 55 %. Left ventricular  ejection fraction was within normal limits by visual estimate. There was no  left ventricular regional abnormality. SUMMARY:  -  Stress results: There was no chest pain during stress. -  ECG conclusions: The stress ECG was negative for ischemia and normal.  -  Perfusion imaging: There was a small to moderate, moderately severe,  reversible myocardial perfusion defect of the mid and apical anterior wall. -  Gated SPECT: The calculated left ventricular ejection fraction was 55 %. Left  ventricular ejection fraction was within normal limits by visual estimate. There was no left ventricular regional abnormality. IMPRESSIONS: Abnormal study after pharmacologic stress. There was a small  amount of ischemia in the mid and apical anterior region.  Left ventricular  systolic function was normal.    Prepared and signed by    Xuan Guzman DO  Signed 03/23/2016 14:58:45            Max Palmer PA-C

## 2023-10-06 NOTE — PROGRESS NOTES
[address] [facility]  Progress Note  Name: Violeta Remy  MRN: [id number]  Unit/Bed#: 4220 [facility] I Date of Admission: 10/4/2023   Date of Service: 10/6/2023 I Hospital Day: 1    Assessment/Plan   * Chest pain  Assessment & Plan  Patient presents with sudden onset substernal chest pain and left low back pain around 5 PM while watching TV. Reports gained 5 pounds today and had fluids in the legs when waking up this morning. So she took Zaroxolyn 5 mg p.o. in addition to her Demadex 40 mg p.o. twice daily. Reports chest pain felt like when she had CHF in the past.  Reports chest pain went away after receiving medication in ED. · EKG showed paced rhythm  · Troponins mildly elevated and downtrending  ·   · Chest x-ray showing fluid overload  · Suspect due to CHF exacerbation. · S/P full dose aspirin x1  · Continue Xarelto  · Telemetry  · Consult cardiology  · Continues Xarelto and Toprol  · F/u Stress test results      Acute on chronic diastolic congestive heart failure (HCC)  Assessment & Plan  Wt Readings from Last 3 Encounters:   10/06/23 111 kg (244 lb 11.4 oz)   09/26/23 108 kg (237 lb)   09/16/23 109 kg (239 lb 10.2 oz)       · On Demadex 40mg p.o. twice daily and Zaroxolyn 5mg p.o. as needed for weight gain  · 2D echo August 2023 showed EF 70%. · Chest x-ray and BNP as above  · Telemetry  · Cardiac diet, FR 1500 ml per day  · Daily weight, intake output  · Consult cardiology  · Continue IV diuretics per cardiology    Paroxysmal A-fib (HCC)  Assessment & Plan  · S/P AICD implant  · EKG showed v-paced rhythm  · Continue metoprolol, Xarelto  · Optimize electrolytes    HTN (hypertension)  Assessment & Plan  · Continue Cardura, metoprolol  · BP labile    Stage 4 chronic kidney disease ([address])  Assessment & Plan  · Baseline creatinine appears to be around 1.9 - 2.9 since 8/2023.   · Cr on admission 3.49  · Most likely cardiorenal syndrome  · Continue Lasix 80 IV twice daily  · Monitor creatinine function while on diuretics  · Improving down to 3.02 today    History of monomorphic ventricular tachycardia, s/p ICD in 2016  Assessment & Plan  · Status post AICD implant    Left upper quadrant abdominal pain-resolved as of 10/5/2023  Assessment & Plan  · Reports left upper quadrant abdominal pain for few days. Denies nausea vomiting diarrhea constipation fever chills. · We will try Bentyl as needed  · Monitor for pain  · Resolved             VTE Pharmacologic Prophylaxis:   Moderate Risk (Score 3-4) - Pharmacological DVT Prophylaxis Ordered: rivaroxaban (Xarelto). Patient Centered Rounds: I performed bedside rounds with nursing staff today. Discussions with Specialists or Other Care Team Provider: case management    Education and Discussions with Family / Patient: Patient declined call to . Total Time Spent on Date of Encounter in care of patient: 35 mins. This time was spent on one or more of the following: performing physical exam; counseling and coordination of care; obtaining or reviewing history; documenting in the medical record; reviewing/ordering tests, medications or procedures; communicating with other healthcare professionals and discussing with patient's family/caregivers. Current Length of Stay: 1 day(s)  Current Patient Status: Inpatient   Certification Statement: The patient will continue to require additional inpatient hospital stay due to chf exacerbation  Discharge Plan: Anticipate discharge in 24-48 hrs to discharge location to be determined pending rehab evaluations. Code Status: Level 1 - Full Code    Subjective:   Patient seen examined at bedside. States that her breathing is improved. Denies any fevers or chills. Asking to go home. Advised she needs another day of diuretics.      Objective:     Vitals:   Temp (24hrs), Av.3 °F (36.8 °C), Min:98 °F (36.7 °C), Max:98.7 °F (37.1 °C)    Temp:  [98 °F (36.7 °C)-98.7 °F (37.1 °C)] 98.7 °F (37.1 °C)  HR:  [59-69] 62  Resp:  [16-20] 18  BP: ()/(51-71) 119/70  SpO2:  [91 %-97 %] 95 %  Body mass index is 38.33 kg/m². Input and Output Summary (last 24 hours): Intake/Output Summary (Last 24 hours) at 10/6/2023 1342  Last data filed at 10/5/2023 1946  Gross per 24 hour   Intake 237 ml   Output 550 ml   Net -313 ml       Physical Exam:   Physical Exam  Vitals and nursing note reviewed. Constitutional:       General: She is not in acute distress. Appearance: She is well-developed. HENT:      Head: Normocephalic and atraumatic. Eyes:      Conjunctiva/sclera: Conjunctivae normal.   Cardiovascular:      Rate and Rhythm: Normal rate and regular rhythm. Heart sounds: Murmur heard. Pulmonary:      Effort: Pulmonary effort is normal.      Comments: Rales at bilateral lung bases  Abdominal:      Palpations: Abdomen is soft. Tenderness: There is no abdominal tenderness. Musculoskeletal:         General: No swelling. Cervical back: Neck supple. Skin:     General: Skin is warm and dry. Capillary Refill: Capillary refill takes less than 2 seconds. Neurological:      Mental Status: She is alert. Mental status is at baseline.    Psychiatric:         Mood and Affect: Mood normal.          Additional Data:     Labs:  Results from last 7 days   Lab Units 10/06/23  0529 10/05/23  0536   WBC Thousand/uL 3.56* 5.48   HEMOGLOBIN g/dL 11.2* 11.5   HEMATOCRIT % 35.6 36.2   PLATELETS Thousands/uL 122* 160   NEUTROS PCT %  --  64   LYMPHS PCT %  --  25   MONOS PCT %  --  8   EOS PCT %  --  2     Results from last 7 days   Lab Units 10/06/23  0529 10/05/23  0536 10/04/23  2224   SODIUM mmol/L 136   < > 136   POTASSIUM mmol/L 3.2*   < > 2.6*   CHLORIDE mmol/L 96   < > 92*   CO2 mmol/L 32   < > 33*   BUN mg/dL 45*   < > 40*   CREATININE mg/dL 3.02*   < > 3.49*   ANION GAP mmol/L 8   < > 11   CALCIUM mg/dL 8.3*   < > 9.0   ALBUMIN g/dL 3.4*  --  3.8   TOTAL BILIRUBIN mg/dL  --   --  0.37   ALK PHOS U/L  --   --  65   ALT U/L  --   --  19   AST U/L  --   --  23   GLUCOSE RANDOM mg/dL 125   < > 116    < > = values in this interval not displayed. Results from last 7 days   Lab Units 10/04/23  2224   INR  1.45*                   Lines/Drains:  Invasive Devices     Peripheral Intravenous Line  Duration           Peripheral IV 10/04/23 Dorsal (posterior); Left Hand 1 day                  Telemetry:  Telemetry Orders (From admission, onward)             24 Hour Telemetry Monitoring  Continuous x 24 Hours (Telem)        Question:  Reason for 24 Hour Telemetry  Answer:  PCI/EP study (including pacer and ICD implementation), Cardiac surgery, MI, abnormal cardiac cath, and chest pain- rule out MI                 Telemetry Reviewed: paced rhythm 61 bpm  Indication for Continued Telemetry Use: Acute CHF on >200 mg lasix/day or equivalent dose or with new reduced EF.               Imaging: Reviewed radiology reports from this admission including: chest xray    Recent Cultures (last 7 days):         Last 24 Hours Medication List:   Current Facility-Administered Medications   Medication Dose Route Frequency Provider Last Rate   • acetaminophen  650 mg Oral Q6H PRN CHARLOTTE Bell     • dicyclomine  20 mg Oral 4x Daily PRN CHARLOTTE Bell     • doxazosin  2 mg Oral HS CHARLOTTE Bell     • ferrous gluconate  162 mg Oral Once per day on Mon Wed Fri CHARLOTTE Bell     • furosemide  80 mg Intravenous BID (diuretic) CHARLOTTE Bell     • metoprolol succinate  50 mg Oral Daily CHARLOTTE Bell     • nicotine  1 patch Transdermal Daily CHARLOTTE Bell     • ondansetron  4 mg Intravenous Q6H PRN CHARLOTTE Bell     • pantoprazole  40 mg Oral Early Morning CHARLOTTE Bell     • potassium chloride  40 mEq Oral TID With Meals Antonette Luna DO     • potassium chloride  40 mEq Oral Daily Kristen Christy PA-C     • rivaroxaban  15 mg Oral QPM CHARLOTTE Bell     • traMADol  25 mg Oral Q6H PRN Shira Saab CHARLOTTE          Today, Patient Was Seen By: Laura Ellis DO    **Please Note: This note may have been constructed using a voice recognition system. **

## 2023-10-06 NOTE — UTILIZATION REVIEW
NOTIFICATION OF INPATIENT ADMISSION   AUTHORIZATION REQUEST   SERVICING FACILITY:   [address] [place] [address]  Tax ID: [id number]  NPI: [id number] ATTENDING PROVIDER:  Attending Name and NPI#: Sara Chan [[id number]]  Address: [address] [place] [address]  Phone: [phone]   ADMISSION INFORMATION:  Place of Service: Inpatient [facility]   Place of Service Code: 21  Inpatient Admission Date/Time: 10/5/23 12:26 AM  Discharge Date/Time: No discharge date for patient encounter. Admitting Diagnosis Code/Description:  Chest pain [R07.9]  DANIELA (acute kidney injury) ([address]) [N17.9]  Chest pain, unspecified type [R07.9]  Acute on chronic heart failure with preserved ejection fraction ([address]) [I50.33]     UTILIZATION REVIEW CONTACT:  Ever Bhatt Utilization   Network Utilization Review Department  Phone: [phone]  Fax [phone]  Email: [email or web address]  Contact for approvals/pending authorizations, clinical reviews, and discharge. PHYSICIAN ADVISORY SERVICES:  Medical Necessity Denial & [id number] Review  Phone: [phone]  Fax: [phone]  Email: [email or web address]     DISCHARGE SUPPORT TEAM:  For Patients Discharge Needs & Updates  Phone: [phone] opt. 2 Fax: [phone]  Email: [email or web address]

## 2023-10-06 NOTE — ASSESSMENT & PLAN NOTE
Wt Readings from Last 3 Encounters:   10/06/23 111 kg (244 lb 11.4 oz)   09/26/23 108 kg (237 lb)   09/16/23 109 kg (239 lb 10.2 oz)       · On Demadex 40mg p.o. twice daily and Zaroxolyn 5mg p.o. as needed for weight gain  · 2D echo August 2023 showed EF 70%. · Chest x-ray and BNP as above  · Telemetry  · Cardiac diet, FR 1500 ml per day  · Daily weight, intake output  · Consult cardiology  · Diuresed well with Lasix 80 IV bid  · Consulted nephrology regarding diuretics on d/c given advanced ckd  · Per Nephro: Start torsemide 100 mg daily and can be discharged with torsemide 100 mg daily. Can utilize metolazone as needed for weight gain but I asked her to call her nephrologist during the week about any increased weight gains.   · BMP ordered in one week

## 2023-10-06 NOTE — PLAN OF CARE
Problem: Prexisting or High Potential for Compromised Skin Integrity  Goal: Skin integrity is maintained or improved  Description: INTERVENTIONS:  - Identify patients at risk for skin breakdown  - Assess and monitor skin integrity  - Assess and monitor nutrition and hydration status  - Monitor labs   - Assess for incontinence   - Turn and reposition patient  - Assist with mobility/ambulation  - Relieve pressure over bony prominences  - Avoid friction and shearing  - Provide appropriate hygiene as needed including keeping skin clean and dry  - Evaluate need for skin moisturizer/barrier cream  - Collaborate with interdisciplinary team   - Patient/family teaching  - Consider wound care consult   Outcome: Progressing     Problem: PAIN - ADULT  Goal: Verbalizes/displays adequate comfort level or baseline comfort level  Description: Interventions:  - Encourage patient to monitor pain and request assistance  - Assess pain using appropriate pain scale  - Administer analgesics based on type and severity of pain and evaluate response  - Implement non-pharmacological measures as appropriate and evaluate response  - Consider cultural and social influences on pain and pain management  - Notify physician/advanced practitioner if interventions unsuccessful or patient reports new pain  Outcome: Progressing     Problem: SAFETY ADULT  Goal: Patient will remain free of falls  Description: INTERVENTIONS:  - Educate patient/family on patient safety including physical limitations  - Instruct patient to call for assistance with activity   - Consult OT/PT to assist with strengthening/mobility   - Keep Call bell within reach  - Keep bed low and locked with side rails adjusted as appropriate  - Keep care items and personal belongings within reach  - Initiate and maintain comfort rounds  - Make Fall Risk Sign visible to staff  - Offer Toileting every 2 Hours, in advance of need  - Obtain necessary fall risk management equipment:  - Apply yellow socks and bracelet for high fall risk patients  - Consider moving patient to room near nurses station  Outcome: Progressing     Problem: DISCHARGE PLANNING  Goal: Discharge to home or other facility with appropriate resources  Description: INTERVENTIONS:  - Identify barriers to discharge w/patient and caregiver  - Arrange for needed discharge resources and transportation as appropriate  - Identify discharge learning needs (meds, wound care, etc.)    - Refer to Case Management Department for coordinating discharge planning if the patient needs post-hospital services based on physician/advanced practitioner order or complex needs related to functional status, cognitive ability, or social support system  Outcome: Progressing     Problem: Knowledge Deficit  Goal: Patient/family/caregiver demonstrates understanding of disease process, treatment plan, medications, and discharge instructions  Description: Complete learning assessment and assess knowledge base.   Interventions:  - Provide teaching at level of understanding  - Provide teaching via preferred learning methods  Outcome: Progressing     Problem: CARDIOVASCULAR - ADULT  Goal: Maintains optimal cardiac output and hemodynamic stability  Description: INTERVENTIONS:  - Monitor I/O, vital signs and rhythm  - Monitor for S/S and trends of decreased cardiac output  - Administer and titrate ordered vasoactive medications to optimize hemodynamic stability  - Assess quality of pulses, skin color and temperature  - Assess for signs of decreased coronary artery perfusion  - Instruct patient to report change in severity of symptoms  Outcome: Progressing  Goal: Absence of cardiac dysrhythmias or at baseline rhythm  Description: INTERVENTIONS:  - Continuous cardiac monitoring, vital signs, obtain 12 lead EKG if ordered  - Administer antiarrhythmic and heart rate control medications as ordered  - Monitor electrolytes and administer replacement therapy as ordered  Outcome: Progressing     Problem: SKIN/TISSUE INTEGRITY - ADULT  Goal: Skin Integrity remains intact(Skin Breakdown Prevention)  Description: Assess:  -Perform Baldemar assessment every 12 hours  -Clean and moisturize skin every 8 hrs  -Inspect skin when repositioning, toileting, and assisting with ADLS    -Assess extremities for adequate circulation and sensation     Bed Management:  -Have minimal linens on bed & keep smooth, unwrinkled  -Change linens as needed when moist or perspiring  -Avoid sitting or lying in one position for more than 2 hours while in bed  -Keep HOB at 45 degrees       Activity:  -Mobilize patient 5 times a day  -Encourage activity and walks on unit  -Encourage or provide ROM exercises   -Turn and reposition patient every 2 Hours  -Use appropriate equipment to lift or move patient in bed  -Instruct/ Assist with weight shifting every 30 minutes when out of bed in chair  -Consider limitation of chair time 2 hour intervals    Skin Care:  -Avoid use of baby powder, tape, friction and shearing, hot water or constrictive clothing    -Do not massage red bony areas    Outcome: Progressing  Goal: Incision(s), wounds(s) or drain site(s) healing without S/S of infection  Description: INTERVENTIONS  - Assess and document dressing, incision, wound bed, drain sites and surrounding tissue  - Provide patient and family education  Outcome: Progressing     Problem: Nutrition/Hydration-ADULT  Goal: Nutrient/Hydration intake appropriate for improving, restoring or maintaining nutritional needs  Description: Monitor and assess patient's nutrition/hydration status for malnutrition. Collaborate with interdisciplinary team and initiate plan and interventions as ordered. Monitor patient's weight and dietary intake as ordered or per policy. Utilize nutrition screening tool and intervene as necessary. Determine patient's food preferences and provide high-protein, high-caloric foods as appropriate.      INTERVENTIONS:  - Monitor oral intake, urinary output, labs, and treatment plans  - Assess nutrition and hydration status and recommend course of action  - Evaluate amount of meals eaten  - Assist patient with eating if necessary   - Allow adequate time for meals  - Recommend/ encourage appropriate diets, oral nutritional supplements, and vitamin/mineral supplements  - Order, calculate, and assess calorie counts as needed  - Recommend, monitor, and adjust tube feedings and TPN/PPN based on assessed needs  - Assess need for intravenous fluids  - Provide specific nutrition/hydration education as appropriate  - Include patient/family/caregiver in decisions related to nutrition  Outcome: Progressing

## 2023-10-06 NOTE — ASSESSMENT & PLAN NOTE
· Baseline creatinine appears to be around 1.9 - 2.9 since 8/2023.   · Cr on admission 3.49  · Most likely cardiorenal syndrome  · Improved to 2.75 with IV lasix 80 mg BID  · Consulted nephrology regarding diuretics  · Nephro anticipates new Cr baseline to be the mid to high 2's  · Torsemide incrased from 40 mg daily to 100 mg daily on discharge  · BMP ordered in one week

## 2023-10-06 NOTE — PROGRESS NOTES
[facility] received a referral from [address] [place] [address] to assist patient with resources for housing and SNAP benefits. [facility] attempted to outreach [facility] to discuss the referral. No answer. Left message on her voicemail with reason for call, this writer's contact information, and a request for a call back to assist.     Will outreach again on Monday if no contact prior.

## 2023-10-07 VITALS
SYSTOLIC BLOOD PRESSURE: 114 MMHG | BODY MASS INDEX: 38.41 KG/M2 | HEIGHT: 67 IN | OXYGEN SATURATION: 92 % | HEART RATE: 67 BPM | DIASTOLIC BLOOD PRESSURE: 72 MMHG | RESPIRATION RATE: 20 BRPM | TEMPERATURE: 97.2 F | WEIGHT: 244.71 LBS

## 2023-10-07 LAB
ALBUMIN SERPL BCP-MCNC: 3.9 G/DL (ref 3.5–5)
ANION GAP SERPL CALCULATED.3IONS-SCNC: 10 MMOL/L
BUN SERPL-MCNC: 45 MG/DL (ref 5–25)
CALCIUM SERPL-MCNC: 8.8 MG/DL (ref 8.4–10.2)
CHLORIDE SERPL-SCNC: 93 MMOL/L (ref 96–108)
CO2 SERPL-SCNC: 33 MMOL/L (ref 21–32)
CREAT SERPL-MCNC: 2.75 MG/DL (ref 0.6–1.3)
ERYTHROCYTE [DISTWIDTH] IN BLOOD BY AUTOMATED COUNT: 15 % (ref 11.6–15.1)
GFR SERPL CREATININE-BSD FRML MDRD: 18 ML/MIN/1.73SQ M
GLUCOSE SERPL-MCNC: 87 MG/DL (ref 65–140)
HCT VFR BLD AUTO: 38.2 % (ref 34.8–46.1)
HGB BLD-MCNC: 12 G/DL (ref 11.5–15.4)
MAGNESIUM SERPL-MCNC: 2.2 MG/DL (ref 1.9–2.7)
MCH RBC QN AUTO: 26.4 PG (ref 26.8–34.3)
MCHC RBC AUTO-ENTMCNC: 31.4 G/DL (ref 31.4–37.4)
MCV RBC AUTO: 84 FL (ref 82–98)
PHOSPHATE SERPL-MCNC: 3.4 MG/DL (ref 2.7–4.5)
PLATELET # BLD AUTO: 156 THOUSANDS/UL (ref 149–390)
PMV BLD AUTO: 11.6 FL (ref 8.9–12.7)
POTASSIUM SERPL-SCNC: 3.7 MMOL/L (ref 3.5–5.3)
RBC # BLD AUTO: 4.54 MILLION/UL (ref 3.81–5.12)
SODIUM SERPL-SCNC: 136 MMOL/L (ref 135–147)
WBC # BLD AUTO: 3.92 THOUSAND/UL (ref 4.31–10.16)

## 2023-10-07 PROCEDURE — 85027 COMPLETE CBC AUTOMATED: CPT | Performed by: STUDENT IN AN ORGANIZED HEALTH CARE EDUCATION/TRAINING PROGRAM

## 2023-10-07 PROCEDURE — 99255 IP/OBS CONSLTJ NEW/EST HI 80: CPT | Performed by: INTERNAL MEDICINE

## 2023-10-07 PROCEDURE — 83735 ASSAY OF MAGNESIUM: CPT | Performed by: STUDENT IN AN ORGANIZED HEALTH CARE EDUCATION/TRAINING PROGRAM

## 2023-10-07 PROCEDURE — 80069 RENAL FUNCTION PANEL: CPT | Performed by: STUDENT IN AN ORGANIZED HEALTH CARE EDUCATION/TRAINING PROGRAM

## 2023-10-07 PROCEDURE — 99239 HOSP IP/OBS DSCHRG MGMT >30: CPT | Performed by: STUDENT IN AN ORGANIZED HEALTH CARE EDUCATION/TRAINING PROGRAM

## 2023-10-07 PROCEDURE — 99232 SBSQ HOSP IP/OBS MODERATE 35: CPT | Performed by: INTERNAL MEDICINE

## 2023-10-07 RX ORDER — TORSEMIDE 100 MG/1
100 TABLET ORAL DAILY
Status: DISCONTINUED | OUTPATIENT
Start: 2023-10-07 | End: 2023-10-07 | Stop reason: HOSPADM

## 2023-10-07 RX ORDER — TORSEMIDE 100 MG/1
100 TABLET ORAL DAILY
Qty: 30 TABLET | Refills: 0 | Status: SHIPPED | OUTPATIENT
Start: 2023-10-08 | End: 2023-11-07

## 2023-10-07 RX ADMIN — METOPROLOL SUCCINATE 50 MG: 50 TABLET, EXTENDED RELEASE ORAL at 09:37

## 2023-10-07 RX ADMIN — TRAMADOL HYDROCHLORIDE 25 MG: 50 TABLET, COATED ORAL at 09:36

## 2023-10-07 RX ADMIN — POTASSIUM CHLORIDE 40 MEQ: 1500 TABLET, EXTENDED RELEASE ORAL at 09:37

## 2023-10-07 RX ADMIN — PANTOPRAZOLE SODIUM 40 MG: 40 TABLET, DELAYED RELEASE ORAL at 05:04

## 2023-10-07 NOTE — PROGRESS NOTES
Progress Note - Cardiology   [facility]     Pk Hennessy 62 y.o. female MRN: [id number]  : 1965  Unit/Bed#: 2 [place] [address] Encounter: [id number]       ASSESSMENT:  Chest pain with mildly elevated cardiac troponin. Nonischemic myocardial injury with underlying hypertrophic cardiomyopathy and CKD  No significant ischemia/abnormality on Lexiscan     Acute on chronic diastolic heart failure  EF 70%, moderate apical and septal LVH     Paroxysmal atrial flutter, [id number] score is 4  History of VT  S/p AV node ablation on 5/10/2023 and dual-chamber ICD/pacemaker insertion     CKD  Chronic tobacco abuse  Hypokalemia  Essential hypertension  Obesity  SURINDER, on CPAP        RECOMMENDATIONS:  Continues Xarelto and Toprol  Patient is on 80 mg of IV Lasix twice daily. Transition to oral diuretic as per renal.  Awaiting nephrology recommendations  Patient is stable from cardiac standpoint          Subjective / Objective:     Review of Systems  Awake, alert, and no distress. Denies chest pain or unusual dyspnea  Vitals: Blood pressure 114/72, pulse 67, temperature (!) 97.2 °F (36.2 °C), resp. rate 20, height 5' 7" (1.702 m), weight 111 kg (244 lb 11.4 oz), SpO2 92 %, not currently breastfeeding. Vitals:    10/06/23 0745 10/06/23 0925   Weight: 111 kg (243 lb 13.3 oz) 111 kg (244 lb 11.4 oz)     Body mass index is 38.33 kg/m². BP Readings from Last 3 Encounters:   10/07/23 114/72   23 110/68   23 143/87     Orthostatic Blood Pressures      Flowsheet Row Most Recent Value   Blood Pressure 114/72 filed at 10/07/2023 0856   Patient Position - Orthostatic VS Lying filed at 10/06/2023 1524          I/O         10/05 0701  10/06 0700 10/06 0701  10/07 0700 10/07 0701  10/08 0700    P. O. 237 360 320    Total Intake(mL/kg) 237 (2.1) 360 (3.2) 320 (2.9)    Urine (mL/kg/hr) 550 (0.2) 3200 (1.2)     Total Output 550 3200     Net -313 -2840 +320                 Invasive Devices       Peripheral Intravenous Line  Duration             Peripheral IV 10/04/23 Dorsal (posterior); Left Hand 2 days                      Intake/Output Summary (Last 24 hours) at 10/7/2023 1001  Last data filed at 10/7/2023 0900  Gross per 24 hour   Intake 680 ml   Output 2800 ml   Net -2120 ml         Physical Exam:   Physical Exam    Neurologic:  Alert & oriented x 3,  no focal deficits noted   Constitutional: Obese  Eyes:  PERRL, conjunctiva normal   HENT:  Atraumatic, external ears normal, nose normal, . NECK: Normal range of motion, no tenderness, neck is supple , No JVP  Respiratory:  Bilateral air entry, no significant crepitations or rhonchi  Cardiovascular: Regular S1-S2 with a I/VI systolic murmur. No pericardial rub or gallop audible  GI:  Soft, nondistended, audible bowel sounds, nontender, no hepatosplenomegaly appreciated  Musculoskeletal:  No tenderness, no deformities. Extremities:  No appreciable pitting edema.    Psychiatric:  Speech and behavior appropriate             Medications/ Allergies:     Current Facility-Administered Medications   Medication Dose Route Frequency Provider Last Rate    acetaminophen  650 mg Oral Q6H PRN Cuiyin Yurik, CRNP      dicyclomine  20 mg Oral 4x Daily PRN Cuikinseyn Yurik, CRNP      doxazosin  2 mg Oral HS Cuiyin Jeannerik, CRNP      ferrous gluconate  162 mg Oral Once per day on Mon Wed Fri Cuiyin Jeannerik, CRNP      metoprolol succinate  50 mg Oral Daily Cuiyin Yurik, CRNP      nicotine  1 patch Transdermal Daily Cuiyin Yurik, CRNP      ondansetron  4 mg Intravenous Q6H PRN Cuiyin Yurik, CRNP      pantoprazole  40 mg Oral Early Morning Cuiyin Yurik, CRNP      potassium chloride  40 mEq Oral Daily Narda Jerez PA-C      rivaroxaban  15 mg Oral QPM Cuiyin Yurik, CRNP      traMADol  25 mg Oral Q6H PRN Cuiyin Yurik, CRNP       acetaminophen, 650 mg, Q6H PRN  dicyclomine, 20 mg, 4x Daily PRN  ondansetron, 4 mg, Q6H PRN  traMADol, 25 mg, Q6H PRN      Allergies   Allergen Reactions    Coconut Oil - Food Allergy Hives    Iodinated Contrast Media Hives    Tape  [Medical Tape] Hives           Labs:   Troponins:      CBC with diff:  Results from last 7 days   Lab Units 10/07/23  0513 10/06/23  0529 10/05/23  0536 10/04/23  2056   WBC Thousand/uL 3.92* 3.56* 5.48 5.67   HEMOGLOBIN g/dL 12.0 11.2* 11.5 11.9   HEMATOCRIT % 38.2 35.6 36.2 38.1   MCV fL 84 85 84 84   PLATELETS Thousands/uL 156 122* 160 179   RBC Million/uL 4.54 4.18 4.31 4.54   MCH pg 26.4* 26.8 26.7* 26.2*   MCHC g/dL 31.4 31.5 31.8 31.2*   RDW % 15.0 15.1 15.0 15.2*   MPV fL 11.6 11.9 11.5 11.5   NRBC AUTO /100 WBCs  --   --  0 0       CMP:  Results from last 7 days   Lab Units 10/07/23  0513 10/06/23  0529 10/05/23  0536 10/04/23  2224   SODIUM mmol/L 136 136 137 136   POTASSIUM mmol/L 3.7 3.2* 3.4* 2.6*   CHLORIDE mmol/L 93* 96 94* 92*   CO2 mmol/L 33* 32 34* 33*   ANION GAP mmol/L 10 8 9 11   BUN mg/dL 45* 45* 44* 40*   CREATININE mg/dL 2.75* 3.02* 3.46* 3.49*   CALCIUM mg/dL 8.8 8.3* 8.8 9.0   AST U/L  --   --   --  23   ALT U/L  --   --   --  19   ALK PHOS U/L  --   --   --  65   TOTAL PROTEIN g/dL  --   --   --  7.3   ALBUMIN g/dL 3.9 3.4*  --  3.8   TOTAL BILIRUBIN mg/dL  --   --   --  0.37   EGFR ml/min/1.73sq m 18 16 13 13       Magnesium:  Results from last 7 days   Lab Units 10/07/23  0513 10/06/23  0529 10/05/23  0536 10/04/23  2224   MAGNESIUM mg/dL 2.2 2.2 2.2 2.1     Coags:  Results from last 7 days   Lab Units 10/04/23  2224   PTT seconds 30   INR  1.45*     TSH:    No components found for: "TSH3"  Lipid Profile:    Hgb A1c:    NT-proBNP: No results for input(s): "NTBNP" in the last 72 hours. Imaging & Testing   I have personally reviewed pertinent reports.     Stress strip    Result Date: 10/6/2023  Narrative: Confirmed by Ender Denise ([id number]),  Keyla Trujillo ([id number]) on 10/6/2023 1:25:34 PM    NM Myocardial Perfusion Spect (Pharmacological Induced Stress and/or Rest)    Result Date: 10/6/2023  Narrative:   No major reversible ischemia/perfusion defect noted. There is significant underlying artifact due to increased extracardiac tracer uptake. Stress ECG: The ECG was not diagnostic due to resting ST-T abnormalities due to AV pacing. Perfusion: Suboptimal imaging due to increased extracardiac tracer uptake. No major reversible ischemia/perfusion defect noted. Stress Function: Left ventricular function post-stress is normal.  Calculated ejection fraction is 55%. Perfusion Defect Conclusion: There is no evidence of transient ischemic dilation (TID). XR chest 1 view portable    Result Date: 10/5/2023  Narrative: CHEST INDICATION:   thinks she has chf again. COMPARISON: 9/13/2023 EXAM PERFORMED/VIEWS:  XR CHEST PORTABLE Single view FINDINGS: Persistent mild pulmonary vascular congestion Left-sided cardiac pacer remains Cardiomediastinal silhouette appears unremarkable. No pneumothorax or pleural effusion. Osseous structures appear within normal limits for patient age. Impression: Persistent mild pulmonary vascular congestion Workstation performed: [id number]     XR chest 1 view portable    Result Date: 9/13/2023  Narrative: CHEST INDICATION:   SOB ,cough. COMPARISON: 9/8/2023 EXAM PERFORMED/VIEWS:  XR CHEST PORTABLE Single view FINDINGS: Left-sided cardiac pacer is unchanged Mild cardiomegaly remains Mild pulmonary vascular congestion has developed No pneumothorax or pleural effusion. Osseous structures appear within normal limits for patient age. Impression: Interval development of mild pulmonary vascular congestion Workstation performed: [id number]     XR chest 2 views    Result Date: 9/8/2023  Narrative: CHEST INDICATION:   chest pain with coughing and SOB. COMPARISON: 1 view chest 8/24/2023 EXAM PERFORMED/VIEWS:  XR CHEST PA & LATERAL FINDINGS: Left chest wall cardiac pacer/defibrillator device in place. Cardiomediastinal silhouette appears unremarkable.  No airspace consolidation, pneumothorax, pulmonary edema, or pleural effusion. Osseous structures appear within normal limits for patient age. Surgical clips right upper quadrant of the abdomen attributed to prior cholecystectomy. Impression: No radiographic evidence of acute intrathoracic process. Workstation performed: [id number]     Cardiac EP device report    Result Date: 2023  Narrative: MDT-DUAL CHAMBER ICD (AAI-DDD MODE) - ACTIVE SYSTEM IS MRI CONDITIONAL INTERROGATION DONE VIA [place]        Cardiac testing:   Results for orders placed during the hospital encounter of 21    Echo complete with contrast if indicated    Narrative  [address], [address] [facility]    Transthoracic Echocardiogram  2D, M-mode, Doppler, and Color Doppler    Study date:  25-May-2021    Patient: Elena Montiel  MR number: [id number]  Account number: [de-identified]  : 1965  Age: 64 years  Gender: Female  Status: Inpatient  Location: [facility]  Height: 67 in  Weight: 212 lb  BP: 118/ 62 mmHg    Indications: Afib    Diagnoses: I48.0 - Atrial fibrillation    Sonographer:  PRISCILLA Le  Referring Physician:  Blu Ball MD  Group:  [facility]  Interpreting Physician:  Aubrey Maza MD    SUMMARY    LEFT VENTRICLE:  Systolic function was moderately to markedly reduced. Ejection fraction was estimated to be 30 %. There was moderate diffuse hypokinesis. There was severe concentric hypertrophy. There was significant hypertrophy of the LV apex. RIGHT VENTRICLE:  The size was normal.  Systolic function was reduced. LEFT ATRIUM:  The atrium was dilated. HISTORY: PRIOR HISTORY: Cocaine abuse, Smoker, CKD III, Congestive heart failure. Hypertrophic cardiomyopathy. Atrial fibrillation. Risk factors: hypercholesterolemia. PRIOR PROCEDURES: ICD implantation. Arrhythmia ablation.     PROCEDURE: The study was performed in the [facility]. This was a routine study. The transthoracic approach was used. The study included complete 2D imaging, M-mode, complete spectral Doppler, and color Doppler. The heart rate was 138  bpm, at the start of the study. Images were obtained from the parasternal, apical, subcostal, and suprasternal notch acoustic windows. Intravenous contrast ( .6 mL Definity in NSS) was administered. Echocardiographic views were limited due  to poor acoustic window availability and lung interference. This was a technically difficult study. LEFT VENTRICLE: Size was normal. Systolic function was moderately to markedly reduced. Ejection fraction was estimated to be 30 %. There was moderate diffuse hypokinesis. Wall thickness was markedly increased. There was severe concentric  hypertrophy. There was significant hypertrophy of the LV apex. DOPPLER: Due to tachycardia, there was fusion of early and atrial contributions to ventricular filling. The study was not technically sufficient to allow evaluation of LV  diastolic function. RIGHT VENTRICLE: The size was normal. Systolic function was reduced. Wall thickness was normal. A pacing wire was present. LEFT ATRIUM: The atrium was dilated. RIGHT ATRIUM: Size was normal. A pacing wire was present. MITRAL VALVE: Valve structure was normal. There was normal leaflet separation. DOPPLER: The transmitral velocity was within the normal range. There was no evidence for stenosis. There was trace regurgitation. AORTIC VALVE: The valve was trileaflet. Leaflets exhibited normal thickness and normal cuspal separation. DOPPLER: Transaortic velocity was within the normal range. There was no evidence for stenosis. There was no significant  regurgitation. TRICUSPID VALVE: The valve structure was normal. There was normal leaflet separation. DOPPLER: The transtricuspid velocity was within the normal range. There was no evidence for stenosis. There was trace regurgitation.  Pulmonary artery  systolic pressure was within the normal range. Estimated peak PA pressure was 21 mmHg. PULMONIC VALVE: Leaflets exhibited normal thickness, no calcification, and normal cuspal separation. DOPPLER: The transpulmonic velocity was within the normal range. There was trace regurgitation. PERICARDIUM: There was no pericardial effusion. The pericardium was normal in appearance. AORTA: The root exhibited normal size. SYSTEMIC VEINS: IVC: The inferior vena cava was normal in size. Respirophasic changes were normal.    SYSTEM MEASUREMENT TABLES    2D  %FS: 27.49 %  Ao Diam: 2.77 cm  EDV(Teich): 57.94 ml  EF(Teich): 54.26 %  ESV(Teich): 26.5 ml  IVSd: 2.46 cm  LA Area: 26.06 cm2  LA Diam: 4.27 cm  LVIDd: 3.7 cm  LVIDs: 2.68 cm  LVPWd: 1.79 cm  RA Area: 18.49 cm2  RVIDd: 3.01 cm  RWT: 0.97  SV(Teich): 31.44 ml    CW  TR Vmax: 2.14 m/s  TR maxP.28 mmHg    MM  TAPSE: 1.51 cm    [facility] Accredited Echocardiography Laboratory    Prepared and electronically signed by    Leola Thomas MD  Signed [id number] 14:44:53    Results for orders placed during the hospital encounter of 20    HUBER    Narrative  [address] [place] [address]  [phone]    Transesophageal Echocardiogram  2D, Doppler, and Color Doppler    Study date:  20-May-2020    Patient: Jeffrey Judge  MR number: [id number]  Account number: [de-identified]  : 1965  Age: 54 years  Gender: Female  Status: Outpatient  Location: Cath lab  Height: 67 in  Weight: 221 lb  BP:    Indications: AFIB    Diagnoses: I48.0 - Atrial fibrillation    Sonographer:  PRISCILLA Benavides  Interpreting Physician:  Amina Parker MD  Primary Physician:  Octaviano Monreal MD  Referring Physician:  Amina Parker MD  Group:  [facility]    SUMMARY    LEFT VENTRICLE:  Systolic function was normal. Ejection fraction was estimated to be 60 %. Wall thickness was moderately increased.   There was moderate concentric hypertrophy. LEFT ATRIUM:  The atrium was mildly dilated. LEFT ATRIAL APPENDAGE:  The size was normal.  The function was normal (normal emptying velocity). No thrombus was identified. ATRIAL SEPTUM:  No defect or patent foramen ovale was identified. HISTORY: PRIOR HISTORY: AFIB, PPM, MI, HOCM, HTN, HLD, CKD III, Smoker, Cocaine abuse    PROCEDURE: The procedure was performed in the catheterization laboratory. This was a routine study. The risks and alternatives of the procedure were explained to the patient and informed consent was obtained. The transesophageal approach  was used. The study included complete 2D imaging, complete spectral Doppler, and color Doppler. An adult omniplane probe was inserted by the attending cardiologist. Intubated with ease. One intubation attempt(s). There was no blood  detected on the probe. Image quality was adequate. MEDICATIONS: Anesthesia. LEFT VENTRICLE: Size was normal. Systolic function was normal. Ejection fraction was estimated to be 60 %. Wall thickness was moderately increased. There was moderate concentric hypertrophy. RIGHT VENTRICLE: The size was normal. Systolic function was normal. Wall thickness was normal. A pacing wire was present. LEFT ATRIUM: The atrium was mildly dilated. No mass was present. There was no spontaneous echo contrast ("smoke"). APPENDAGE: The size was normal. No thrombus was identified. DOPPLER: The function was normal (normal emptying velocity). ATRIAL SEPTUM: No defect or patent foramen ovale was identified. RIGHT ATRIUM: Size was normal. No thrombus was identified. MITRAL VALVE: Valve structure was normal. There was normal leaflet separation. There was no echocardiographic evidence of vegetation. DOPPLER: There was no regurgitation. AORTIC VALVE: The valve was trileaflet. Leaflets exhibited normal thickness and normal cuspal separation. There was no echocardiographic evidence of vegetation. DOPPLER: There was no regurgitation. TRICUSPID VALVE: The valve structure was normal. There was normal leaflet separation. There was no echocardiographic evidence of vegetation. DOPPLER: There was no regurgitation. PERICARDIUM: There was no pericardial effusion. The pericardium was normal in appearance. [facility] Accredited Echocardiography Laboratory    Prepared and electronically signed by    Anais Orlando MD  Signed [id number] 09:25:57          Dr. Alexx Zamora MD, [facility]      "This note has been constructed using a voice recognition system. Therefore there may be syntax, spelling, and/or grammatical errors.  Please call if you have any questions. "

## 2023-10-07 NOTE — DISCHARGE SUMMARY
[address] [facility]  Discharge- Beau Schultz 1965, 62 y.o. female MRN: [id number]  Unit/Bed#: 2 [place] [address] Encounter: [id number]  Primary Care Provider: Jonathan Adhikari MD   Date and time admitted to hospital: 10/4/2023  8:03 PM    * Chest pain  Assessment & Plan  Patient presents with sudden onset substernal chest pain and left low back pain around 5 PM while watching TV. Reports gained 5 pounds today and had fluids in the legs when waking up this morning. So she took Zaroxolyn 5 mg p.o. in addition to her Demadex 40 mg p.o. twice daily. Reports chest pain felt like when she had CHF in the past.  Reports chest pain went away after receiving medication in ED. · EKG showed paced rhythm  · Troponins mildly elevated and downtrending  ·   · Chest x-ray showing fluid overload  · Suspect due to CHF exacerbation. · S/P full dose aspirin x1  · Continue Xarelto  · Telemetry  · Consult cardiology  · Continues Xarelto and Toprol  · Stress test 10/6/23  •  No major reversible ischemia/perfusion defect noted. There is significant underlying artifact due to increased extracardiac tracer uptake. •  Stress ECG: The ECG was not diagnostic due to resting ST-T abnormalities due to AV pacing. •  Perfusion: Suboptimal imaging due to increased extracardiac tracer uptake. No major reversible ischemia/perfusion defect noted. •  Stress Function: Left ventricular function post-stress is normal.  Calculated ejection fraction is 55%. •  Perfusion Defect Conclusion: There is no evidence of transient ischemic dilation (TID). · Cleared for d/c by cardiology      Acute on chronic diastolic congestive heart failure [facility])  Assessment & Plan  Wt Readings from Last 3 Encounters:   10/06/23 111 kg (244 lb 11.4 oz)   09/26/23 108 kg (237 lb)   09/16/23 109 kg (239 lb 10.2 oz)       · On Demadex 40mg p.o. twice daily and Zaroxolyn 5mg p.o. as needed for weight gain  · 2D echo August 2023 showed EF 70%.   · Chest x-ray and BNP as above  · Telemetry  · Cardiac diet, FR 1500 ml per day  · Daily weight, intake output  · Consult cardiology  · Diuresed well with Lasix 80 IV bid  · Consulted nephrology regarding diuretics on d/c given advanced ckd  · Per Nephro: Start torsemide 100 mg daily and can be discharged with torsemide 100 mg daily. Can utilize metolazone as needed for weight gain but I asked her to call her nephrologist during the week about any increased weight gains. · BMP ordered in one week    Paroxysmal A-fib (HCC)  Assessment & Plan  · S/P AICD implant  · EKG showed v-paced rhythm  · Continue metoprolol, Xarelto  · Optimize electrolytes    Stage 4 chronic kidney disease (HCC)  Assessment & Plan  · Baseline creatinine appears to be around 1.9 - 2.9 since 8/2023. · Cr on admission 3.49  · Most likely cardiorenal syndrome  · Improved to 2.75 with IV lasix 80 mg BID  · Consulted nephrology regarding diuretics  · Nephro anticipates new Cr baseline to be the mid to high 2's  · Torsemide incrased from 40 mg daily to 100 mg daily on discharge  · BMP ordered in one week    HTN (hypertension)  Assessment & Plan  · Continue Cardura, metoprolol  · BP labile    History of monomorphic ventricular tachycardia, s/p ICD in 2016  Assessment & Plan  · Status post AICD implant    Left upper quadrant abdominal pain-resolved as of 10/5/2023  Assessment & Plan  · Reports left upper quadrant abdominal pain for few days. Denies nausea vomiting diarrhea constipation fever chills.   · We will try Bentyl as needed  · Monitor for pain  · Resolved        Medical Problems     Resolved Problems  Date Reviewed: 10/7/2023          Resolved    Left upper quadrant abdominal pain 10/5/2023     Resolved by  Meghana Kang DO        Discharging Physician / Practitioner: Meghana Kang DO  PCP: Justyn Burns MD  Admission Date:   Admission Orders (From admission, onward)     Ordered        10/05/23 0026  INPATIENT ADMISSION  Once Discharge Date: 10/07/23    Consultations During Hospital Stay:  · Nephrology  · Cardiology    Procedures Performed:   · Stress test    Significant Findings / Test Results:   · DANIELA on CKD IV  · CHF exacerbation    Incidental Findings:   · See above   · I reviewed the above mentioned incidental findings with the patient and/or family and they expressed understanding. Test Results Pending at Discharge (will require follow up):   · none     Outpatient Tests Requested:  · BMP in one week    Complications:  none    Reason for Admission: Chest pain in setting of CHF exacerbation    Hospital Course:   Marsha Bragg is a 62 y.o. female patient who originally presented to the hospital on 10/4/2023 due to chest pain and shortness of breath. On admission patient was noted to be in DANIELA on CKD with creatinine of 3.4 in setting of CHF exacerbation. Cardiology was consulted and patient was adequately diuresed with Lasix 80 IV twice daily. Nephrology was also consulted for recommendations regarding diuretic regimen for discharge given advanced CKD. Nephrology recommending discharging patient on torsemide 100 mg daily. Anticipating new creatinine baseline to be in the mid to high twos and creatinine on discharge of 2.75. Patient also underwent cardiac stress test which did not show any signs of ischemia. Please see above list of diagnoses and related plan for additional information. Condition at Discharge: good    Discharge Day Visit / Exam:   Subjective: Patient states that she feels well currently has no other complaints denies any shortness of breath or chest pain.   States that she wants to go home  Vitals: Blood Pressure: 114/72 (10/07/23 0856)  Pulse: 67 (10/07/23 0856)  Temperature: (!) 97.2 °F (36.2 °C) (10/07/23 0856)  Temp Source: Oral (10/06/23 1524)  Respirations: 20 (10/07/23 0856)  Height: 5' 7" (170.2 cm) (10/06/23 0925)  Weight - Scale: 111 kg (244 lb 11.4 oz) (10/06/23 0925)  SpO2: 92 % (10/07/23 7497)  Exam:   Physical Exam  Vitals and nursing note reviewed. Constitutional:       General: She is not in acute distress. Appearance: She is well-developed. She is obese. HENT:      Head: Normocephalic and atraumatic. Eyes:      Conjunctiva/sclera: Conjunctivae normal.   Cardiovascular:      Rate and Rhythm: Normal rate and regular rhythm. Heart sounds: No murmur heard. Pulmonary:      Effort: Pulmonary effort is normal. No respiratory distress. Breath sounds: Normal breath sounds. Abdominal:      Palpations: Abdomen is soft. Tenderness: There is no abdominal tenderness. Musculoskeletal:         General: No swelling. Cervical back: Neck supple. Skin:     General: Skin is warm and dry. Capillary Refill: Capillary refill takes less than 2 seconds. Neurological:      Mental Status: She is alert. Mental status is at baseline. Psychiatric:         Mood and Affect: Mood normal.          Discussion with Family: Patient declined call to . Discharge instructions/Information to patient and family:   See after visit summary for information provided to patient and family. Provisions for Follow-Up Care:  See after visit summary for information related to follow-up care and any pertinent home health orders. Disposition:   Home    Planned Readmission: no     Discharge Statement:  I spent 45 minutes discharging the patient. This time was spent on the day of discharge. I had direct contact with the patient on the day of discharge. Greater than 50% of the total time was spent examining patient, answering all patient questions, arranging and discussing plan of care with patient as well as directly providing post-discharge instructions. Additional time then spent on discharge activities. Discharge Medications:  See after visit summary for reconciled discharge medications provided to patient and/or family.       **Please Note: This note may have been constructed using a voice recognition system**

## 2023-10-07 NOTE — DISCHARGE INSTR - AVS FIRST PAGE
Nephrology increased your torsemide to 100 mg daily  I ordered repeat blood work to check your kidney function in one week  Please follow up with your nephrologist on discharge

## 2023-10-07 NOTE — PLAN OF CARE
Problem: PAIN - ADULT  Goal: Verbalizes/displays adequate comfort level or baseline comfort level  Description: Interventions:  - Encourage patient to monitor pain and request assistance  - Assess pain using appropriate pain scale  - Administer analgesics based on type and severity of pain and evaluate response  - Implement non-pharmacological measures as appropriate and evaluate response  - Consider cultural and social influences on pain and pain management  - Notify physician/advanced practitioner if interventions unsuccessful or patient reports new pain  Outcome: Progressing     Problem: DISCHARGE PLANNING  Goal: Discharge to home or other facility with appropriate resources  Description: INTERVENTIONS:  - Identify barriers to discharge w/patient and caregiver  - Arrange for needed discharge resources and transportation as appropriate  - Identify discharge learning needs (meds, wound care, etc.)    - Refer to Case Management Department for coordinating discharge planning if the patient needs post-hospital services based on physician/advanced practitioner order or complex needs related to functional status, cognitive ability, or social support system  Outcome: Progressing     Problem: CARDIOVASCULAR - ADULT  Goal: Maintains optimal cardiac output and hemodynamic stability  Description: INTERVENTIONS:  - Monitor I/O, vital signs and rhythm  - Monitor for S/S and trends of decreased cardiac output  - Administer and titrate ordered vasoactive medications to optimize hemodynamic stability  - Assess quality of pulses, skin color and temperature  - Assess for signs of decreased coronary artery perfusion  - Instruct patient to report change in severity of symptoms  Outcome: Progressing
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
## 2023-10-08 LAB
ATRIAL RATE: 300 BPM
P AXIS: 84 DEGREES
QRS AXIS: -71 DEGREES
QRSD INTERVAL: 206 MS
QT INTERVAL: 540 MS
QTC INTERVAL: 552 MS
T WAVE AXIS: 94 DEGREES
VENTRICULAR RATE: 63 BPM

## 2023-10-08 PROCEDURE — 93010 ELECTROCARDIOGRAM REPORT: CPT | Performed by: INTERNAL MEDICINE

## 2023-10-09 ENCOUNTER — PATIENT OUTREACH (OUTPATIENT)
Dept: CASE MANAGEMENT | Facility: OTHER | Age: 58
End: 2023-10-09

## 2023-10-09 LAB
ATRIAL RATE: 62 BPM
P AXIS: 83 DEGREES
PR INTERVAL: 216 MS
QRS AXIS: -78 DEGREES
QRSD INTERVAL: 190 MS
QT INTERVAL: 532 MS
QTC INTERVAL: 539 MS
T WAVE AXIS: 90 DEGREES
VENTRICULAR RATE: 62 BPM

## 2023-10-09 PROCEDURE — 93010 ELECTROCARDIOGRAM REPORT: CPT | Performed by: INTERNAL MEDICINE

## 2023-10-09 NOTE — PROGRESS NOTES
[id number] [facility] [id number] attempted to contact patient to assist with applications for housing and SNAP benefits. No answer, left message on Lupe's voicemail with reason for call, this writer's contact information, and a request for a call back to assist.     Will outreach in one week if no contact prior.

## 2023-10-10 ENCOUNTER — PATIENT OUTREACH (OUTPATIENT)
Dept: CASE MANAGEMENT | Facility: HOSPITAL | Age: 58
End: 2023-10-10

## 2023-10-10 NOTE — PROGRESS NOTES
Advanced Heart Failure / Pulmonary Hypertension Outpatient Visit    Ashley Haro 62 y.o. female   MRN: [id number]  Encounter: [id number]    Assessment:  Patient Active Problem List    Diagnosis Date Noted    Chest pain 10/05/2023    HTN (hypertension) 10/05/2023    Benign hypertension with CKD (chronic kidney disease) stage IV ([address]) 09/26/2023    Facial numbness, resolved 09/13/2023    Thrombocytopenia ([address]) 07/23/2023    Hypokalemia 07/23/2023    Blood in stool 07/16/2023    Abnormal finding on CT scan 05/08/2023    Morbid obesity 01/14/2023    Anemia in stage 4 chronic kidney disease  01/14/2023    Left low back pain 11/05/2022    Acute on chronic heart failure with preserved ejection fraction ([address]) 10/13/2022    Renal cyst 08/12/2022    Diverticulitis of large intestine without perforation or abscess 06/23/2022    Leukopenia 06/18/2022    Nephrolithiasis 06/09/2022    Hepatitis C 01/30/2022    Acute right flank pain 08/15/2021    History of tachycardia induced cardiomyopathy 05/25/2021    Paroxysmal A-fib ([address]) 05/14/2020    Cocaine abuse ([address]) 05/14/2020    Elevated lipase 10/10/2019    Nonischemic nontraumatic myocardial injury due to CHF 09/09/2019    Acute on chronic diastolic congestive heart failure ([address]) 01/23/2019    Headache 12/26/2018    Stage 4 chronic kidney disease ([address]) 12/26/2018    Tobacco abuse 12/26/2018    History of monomorphic ventricular tachycardia, s/p ICD in 2016 07/13/2016    Gastroesophageal reflux disease  03/20/2016    Atypical atrial flutter ([address]) 03/20/2016       Today's Plan:  Reasonable volume status on exam.  Continue current regimen. 2 g sodium restriction, 2 L fluid restriction, daily weights. 2-week follow-up with heart failure AP, 4 weeks with Dr. Pia Calderon.     Plan:  Chronic HFrEF, now recovered; LVEF 65%; LVIDd 3.6 cm; NYHA III; ACC/AHA Stage C              Etiology: nonischemic; tachy-mediated in 05/2021 (LVEF 30%) and again in 06/2022 (LVEF 40-45%) in setting of rapid Aflutter. [facility] 01/07/2019: no obstructive CAD. TTE 04/18/2019: LVEF 65%. TTE 09/18/2020: LVEF 65%. TTE 05/25/2021: LVEF 30%. LVIDd 3.7 cm. Reduced RVSF. Trace MR and TR. TTE 01/31/2022: LVEF 55%. LVIDd 4.8 cm. Grade 2 DD. Mildly reduced RVSF. TTE (limited) 06/19/2022: LVEF 40-45%. LVIDd 4.1 cm.               TTE (limited) 07/28/2022: LVEF 55%. Normal RV. TTE 04/05/2023: LVEF 65%. LVIDd 3.6 cm. Normal RV. Mild TR.               TTE (limited) 05/12/2023: LVEF 65%. Normal RV. NM stress 10/6/23:    No major reversible ischemia/perfusion defect noted. There is significant underlying artifact due to increased extracardiac tracer uptake. Stress ECG: The ECG was not diagnostic due to resting ST-T abnormalities due to AV pacing. Perfusion: Suboptimal imaging due to increased extracardiac tracer uptake. No major reversible ischemia/perfusion defect noted. Stress Function: Left ventricular function post-stress is normal.  Calculated ejection fraction is 55%. Perfusion Defect Conclusion: There is no evidence of transient ischemic dilation (TID). Weight of 239 lbs on 10/5, 244 lbs 10/6. Today, weighs 240 lbs. Most recent BMP from 10/7/2023: sodium 136; potassium 3.7; BUN 45; creatinine 2.75; eGFR 18. Pharmacotherapies / Neurohormonal Blockade:  --Beta Blocker: metoprolol succinate 50 mg daily. --ARNi / ACEi / ARB: No, CKD precludes. --Aldosterone Antagonist: No, CKD precludes. --SGLT2 Inhibitor: No, CKD precludes. --Diuretic: torsemide 100 mg QD with metolazone 5 mg PRN, K 20 mEq QD     Sudden Cardiac Death Risk Reduction:  --Medtronic dual chamber ICD in situ since 07/2016. --Interrogation from  06/19/2023: AP 0%.  98%. Lead parameters WNL. AF burden 41.1%. OptiVol WNL. Normal device function.       Electrical Resynchronization:  --Candidacy for BiV device: RBBB with  ms. Atrial flutter / atrial fibrillation               [id number] = 3 (sex, HF, HTN). Anticoagulation on Xarelto. S/p unspecified ablation at [facility] in 2015. S/p Afib ablation with PVI in 05/2020. S/p atypical flutter and micro-reentrant atrial tachycardia ablation in 06/2022. S/p AV node ablation with ICD reprogramming on 05/10/2023. Rate control: as above. Rhythm control: None. Hypertension              BP of 108/76 mmHg in office today. Chronic kidney disease, stage IV              Previous baseline creatinine of 1.7-2.1, recent baseline has been high 2s. Follows with Dr. Arpita Castro as outpatient. History of monomorphic ventricular tachycardia: noted on outpatient loop recorder; s/p secondary prevention ICD in 07/2016. Obstructive sleep apnea  Tobacco abuse: currently smoking 0.5 ppd. History of cocaine use: last used in 03/2023. Marijuana use     HPI:   Agustina Sanders is a 49-year-old woman with a PMH as above who presents to the office for hospital follow-up. Follows with Dr. Selvin Grant. Presented to [facility] on [facility]/07/2023 with frequent palpitations. Found to be in rapid atrial flutter in ED, started on amiodarone drip, and cardiology consulted. IV Lasix was started, and amiodarone switched to PO diltiazem (due to prior concern that amiodarone may be pro-arrhythmic for patient). Transferred to [facility] on 05/09 for EP evaluation. Seen by EP and underwent AVN ablation with ICD reprogramming on 05/10/2023. Transitioned to PO Lasix on 05/12. Lost 6 lbs this admission. 05/17/2023: Patient presents with her  for hospital follow-up. Up 7 lbs since discharge. Reports her normal weight prior to her hospitalization was around 322 lbs (confirmed in Epic).  With this weight gain, she reports new ABDUL after walking about 1/2 block as well as hand swelling (symptom she has had before when overloaded). Has chronic mild orthopnea, and denies PND. Reports at time of admission was only able to walk a few steps before feeling winded. One month ago, was walking and active with no limitations. Has only been taking 20 mg Lasix daily (rather than 40 mg) - was using tablets from a prior prescription. Unable to tolerate potassium pills - has been vomiting them up soon after taking. Has good appetite with occasional early satiety. Recent meals included chicken pot pie (at restaurant on Mother's Day) and salads with ranch or [place] dressing. Does not add salt to foods; uses Mrs. Matos only. 05/31/2023 with TH: "Presents for 2 week follow up. Was only taking half of her Lasix dose at last visit and weight was up significantly. Today she reports taking 40 mg of Lasix since last visit and weight is back down to baseline. However patient is markedly hypertensive in clinic today with systolic over 409. Tells me the last 4-5 days she has been checking at home and getting readings as high as 799, systolic. Has developed headaches and now experiencing orthopnea with PND over past week. She has been adherent to medical therapy. Still smoking a few cigarettes per day and occasional marijuana." Sent to hospital for admission for hypertensive urgency (was admitted for 28 hours). Admitted as inpatient (not observation) to [facility] from 07/16 to 07/17/2023 after presenting with chest pain, ABDUL, and LE swelling. BP in ED of 172/98 mmHg. Cardiac work-up unremarkable. . Received one dose IV Lasix and was discharged home.      07/21/2023: Patient presents for hospital follow-up. Up 4-5 lbs since discharge with worsening SOB and LE swelling. Has chronic mild orthopnea but reports worsening in severity and frequency as well as new PND. Denies missing any medications doses.  In fact took an extra 40 mg Lasix on 07/18 and again on 07/20 due to weight gain on home scale with minimal improvement in symptoms and no resultant weight loss. Denies any recent dietary indiscretion. Has been admitted multiple times over the past couple months for ADHF, most recently at [facility] from 10/4-10/7. Seen by cardiology and nephrology, required IV diuresis and discharged on torsemide 100 mg daily with PRN metolazone. Noted to have an DANIELA on arrival, with creatinine 3.4 on arrival and trending down to 2.75 on discharge. Stress test performed inpatient unrevealing for ischemia. 10/11/23: Presents today for follow up. Feeling good, breathing well, denies LE swelling, PND. No orthopnea. Feels much improved after recent admission, taking torsemide 100 mg QD and feels like she's urinating well with this. Amenable to frequent follow ups. Denies any cardiac complaints today. Past Medical History:   Diagnosis Date    ACS (acute coronary syndrome) ([address]) 02/07/2021    Acute on chronic diastolic CHF 82/03/5270    Anemia 01/14/2023    Arthritis     Atrial fibrillation with rapid ventricular response ([address]) 03/20/2016    Breast lump     CKD (chronic kidney disease) stage 3, GFR 30-59 ml/min ([facility])     Disease of thyroid gland     Elevated troponin 09/09/2019    Epigastric abdominal tenderness 08/15/2021    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 ([address]) 05/25/2020    GERD (gastroesophageal reflux disease)     H/O transfusion 1987    Hepatitis C     resolved    History of tachycardia induced cardiomyopathy 05/2021    in setting of rapid atrial flutter in 05/2021 and again 06/2022    History of ventricular tachycardia 07/2016    s/p ICD    Hyperlipidemia     Hypertension     Hypokalemia 07/23/2023    Inappropriate ICD discharge for atrial flutter 04/2023    NSTEMI (non-ST elevated myocardial infarction) ([address]) 12/26/2018    Sleep apnea     no cpap     Review of Systems   Constitutional:  Negative for chills, fever and unexpected weight change.    HENT:  Negative for ear pain and sore throat. Eyes:  Negative for pain and visual disturbance. Respiratory:  Negative for cough and shortness of breath. Cardiovascular:  Negative for chest pain, palpitations and leg swelling. Gastrointestinal:  Negative for abdominal pain and vomiting. Genitourinary:  Negative for dysuria and hematuria. Musculoskeletal:  Negative for arthralgias and back pain. Skin:  Negative for color change and rash. Neurological:  Negative for seizures and syncope. All other systems reviewed and are negative. 14-point ROS completed and negative except as stated above and/or in the HPI.       Allergies   Allergen Reactions    Coconut Oil - Food Allergy Hives    Iodinated Contrast Media Hives    Tape  [Medical Tape] Hives       Current Outpatient Medications:     Diclofenac Sodium (VOLTAREN) 1 %, Apply 2 g topically every 6 (six) hours as needed (pain), Disp: 100 g, Rfl: 0    doxazosin (CARDURA) 2 mg tablet, take 1 tablet by mouth daily at bedtime, Disp: 30 tablet, Rfl: 5    ferrous gluconate (FERGON) 240 (27 FE) MG tablet, Take 0.5 tablets (120 mg total) by mouth 3 (three) times a week (Patient taking differently: Take 120 mg by mouth 3 (three) times a week MWF), Disp: 6 tablet, Rfl: 0    metolazone (ZAROXOLYN) 5 mg tablet, Take 1 tablet (5 mg total) by mouth if needed (as needed for weight gain >3 lbs in 1 day or >5 lbs in 2-3 days), Disp: 10 tablet, Rfl: 0    metoprolol succinate (TOPROL-XL) 50 mg 24 hr tablet, Take 1 tablet (50 mg total) by mouth daily, Disp: 30 tablet, Rfl: 3    pantoprazole (PROTONIX) 40 mg tablet, take 1 tablet by mouth once daily, Disp: 30 tablet, Rfl: 2    potassium chloride (K-DUR,KLOR-CON) 20 mEq tablet, Take 1 tablet (20 mEq total) by mouth daily (Patient taking differently: Take 20 mEq by mouth daily), Disp: 90 tablet, Rfl: 3    rivaroxaban (XARELTO) 15 mg tablet, Take 1 tablet (15 mg total) by mouth every evening, Disp: 30 tablet, Rfl: 11    torsemide (DEMADEX) 100 mg tablet, Take 1 tablet (100 mg total) by mouth daily Do not start before October 8, 2023., Disp: 30 tablet, Rfl: 0    nicotine (NICODERM CQ) 7 mg/24hr TD 24 hr patch, Place 1 patch on the skin over 24 hours daily Apply a new patch every 24 hours to a clean, dry, hairless site on the upper arm or hip. (Patient not taking: Reported on 9/26/2023), Disp: 28 patch, Rfl: 0    Social History     Socioeconomic History    Marital status: /Civil Union     Spouse name: Not on file    Number of children: Not on file    Years of education: 12    Highest education level: Not on file   Occupational History    Not on file   Tobacco Use    Smoking status: Every Day     Packs/day: 0.50     Years: 35.00     Total pack years: 17.50     Types: Cigarettes    Smokeless tobacco: Never   Vaping Use    Vaping Use: Never used   Substance and Sexual Activity    Alcohol use: Not Currently    Drug use: Yes     Types: Marijuana    Sexual activity: Yes     Partners: Male     Birth control/protection: None   Other Topics Concern    Not on file   Social History Narrative    Disabled        · Do you currently or have you served in the [address] [place] [address] [place]:   No      · Were you activated, into active duty, as a member of the [employer] or as a Reservist:   No      · Diet:   Regular      · General stress level:   High      · Has smoked since age:   12      · Caffeine intake: Moderate      · Guns present in home:   No      · Seat belts used routinely:   Yes      · Sunscreen used routinely:   No      · Advance directive:   No      · Live alone or with others:   with others      · International travel:   no      · Pets:   No      · Blind or serious difficulty seeing: Yes     · Difficulty concentrating, remembering or making decisions:   No      · Difficulty walking or climbing stairs:    Yes      · Difficulty dressing or bathing:   No      · Difficulty doing errands alone:   No      · What is the highest grade or level of school you have completed or the highest degree you have received:   12th grade, no diploma      · How many days of moderate to strenuous exercise, like a brisk walk, did you do in the last 7 days:   0      · How hard is it for you to pay for the very basics like food, housing, medical care, and heating:   Somewhat hard      · Do you feel stress - tense, restless, nervous, or anxious, or unable to sleep at night because your mind is troubled all the time - these days: Only a little          Social Determinants of Health     Financial Resource Strain: Not on file   Food Insecurity: No Food Insecurity (10/5/2023)    Hunger Vital Sign     Worried About Running Out of Food in the Last Year: Never true     Ran Out of Food in the Last Year: Never true   Transportation Needs: No Transportation Needs (10/5/2023)    PRAPARE - Transportation     Lack of Transportation (Medical): No     Lack of Transportation (Non-Medical): No   Physical Activity: Not on file   Stress: Not on file   Social Connections: Not on file   Intimate Partner Violence: Not on file   Housing Stability: High Risk (10/5/2023)    Housing Stability Vital Sign     Unable to Pay for Housing in the Last Year: No     Number of Places Lived in the Last Year: 1     Unstable Housing in the Last Year: Yes     Family History   Problem Relation Age of Onset    Arthritis Family     Cancer Family     Diabetes Family     Hypertension Family     Cancer Maternal Grandmother        Vitals:  Blood pressure 108/76, pulse 73, height 5' 7.5" (1.715 m), weight 109 kg (240 lb 14.4 oz), SpO2 97 %, not currently breastfeeding. Body mass index is 37.17 kg/m².   Wt Readings from Last 10 Encounters:   10/11/23 109 kg (240 lb 14.4 oz)   10/06/23 111 kg (244 lb 11.4 oz)   09/26/23 108 kg (237 lb)   09/16/23 109 kg (239 lb 10.2 oz)   09/09/23 108 kg (238 lb 1.6 oz)   08/31/23 109 kg (240 lb 9.6 oz)   08/24/23 111 kg (244 lb 9.6 oz)   08/21/23 108 kg (237 lb 3.4 oz)   08/19/23 112 kg (246 lb 11.1 oz)   08/16/23 109 kg (240 lb) Vitals:    10/11/23 1536   BP: 108/76   BP Location: Right arm   Patient Position: Sitting   Cuff Size: Large   Pulse: 73   SpO2: 97%   Weight: 109 kg (240 lb 14.4 oz)   Height: 5' 7.5" (1.715 m)       Physical Exam  Constitutional:       Appearance: Normal appearance. HENT:      Head: Normocephalic. Nose: Nose normal.      Mouth/Throat:      Mouth: Mucous membranes are moist.   Eyes:      Conjunctiva/sclera: Conjunctivae normal.   Neck:      Vascular: No JVD. Cardiovascular:      Rate and Rhythm: Normal rate and regular rhythm. Pulmonary:      Effort: Pulmonary effort is normal.      Breath sounds: Normal breath sounds. Musculoskeletal:      Cervical back: Neck supple. Right lower leg: No edema. Left lower leg: No edema. Skin:     General: Skin is warm and dry. Neurological:      General: No focal deficit present. Mental Status: She is alert and oriented to person, place, and time. Psychiatric:         Mood and Affect: Mood normal.         Behavior: Behavior normal.         Labs & Results:  Lab Results   Component Value Date    WBC 3.92 (L) 10/07/2023    HGB 12.0 10/07/2023    HCT 38.2 10/07/2023    MCV 84 10/07/2023     10/07/2023     Lab Results   Component Value Date    SODIUM 136 10/07/2023    K 3.7 10/07/2023    CL 93 (L) 10/07/2023    CO2 33 (H) 10/07/2023    BUN 45 (H) 10/07/2023    CREATININE 2.75 (H) 10/07/2023    GLUC 87 10/07/2023    CALCIUM 8.8 10/07/2023     Lab Results   Component Value Date    INR 1.45 (H) 10/04/2023    INR 1.22 (H) 04/05/2023    INR 1.22 (H) 04/04/2023    PROTIME 17.9 (H) 10/04/2023    PROTIME 15.6 (H) 04/05/2023    PROTIME 15.7 (H) 04/04/2023     Lab Results   Component Value Date     (H) 10/04/2023      Lab Results   Component Value Date    NTBNP 9,053 (H) 01/13/2023        Thank you for the opportunity to participate in the care of this patient.     Juliane Newman PA-C

## 2023-10-10 NOTE — UTILIZATION REVIEW
NOTIFICATION OF ADMISSION DISCHARGE   This is a Notification of Discharge from [address]. Please be advised that this patient has been discharge from our facility. Below you will find the admission and discharge date and time including the patient’s disposition. UTILIZATION REVIEW CONTACT:  Cooper Acosta  Utilization   Network Utilization Review Department  Phone: [phone] x carefully listen to the prompts. All voicemails are confidential.  Email: [email or web address]     ADMISSION INFORMATION  PRESENTATION DATE: 10/4/2023  8:03 PM  OBERVATION ADMISSION DATE:   INPATIENT ADMISSION DATE: 10/5/23 12:26 AM   DISCHARGE DATE: 10/7/2023  3:22 PM   DISPOSITION:Home/Self Care    Network Utilization Review Department  ATTENTION: Please call with any questions or concerns to [phone] and carefully listen to the prompts so that you are directed to the right person. All voicemails are confidential.   For Discharge needs, contact Care Management DC Support Team at [phone] opt. 2  Send all requests for admission clinical reviews, approved or denied determinations and any other requests to dedicated fax number below belonging to the campus where the patient is receiving treatment.  List of dedicated fax numbers for the Facilities:  [place] DENIALS (Administrative/Medical Necessity) [phone]   DISCHARGE SUPPORT TEAM (Network) [phone] [address] [facility] (Maternity/NICU/Pediatrics) [phone]   [address] [facility] [address] [phone] [id number] [facility] [phone]   [address] [place] [address]  [address]  [phone]

## 2023-10-10 NOTE — PROGRESS NOTES
Heart Failure Outpatient Care Coordinator Note: Chart notes reviewed and call made to patient. She states she is at an orthopedic appointment for her knee and requests call back. Will follow up. Called patient back, she is [facility] [employer] with her great grandkids and only spoke to her briefly. She states she feels that the torsemide 100 mg daily is working better. Wanted to know about the [facility] helping her with housing/shelters and I let her know that she did call and leave a message for her yesterday. I will text patient the number to call her back and will call patient tomorrow to review medication and weights.

## 2023-10-11 ENCOUNTER — PATIENT OUTREACH (OUTPATIENT)
Dept: CASE MANAGEMENT | Facility: HOSPITAL | Age: 58
End: 2023-10-11

## 2023-10-11 ENCOUNTER — OFFICE VISIT (OUTPATIENT)
Dept: CARDIOLOGY CLINIC | Facility: CLINIC | Age: 58
End: 2023-10-11
Payer: COMMERCIAL

## 2023-10-11 VITALS
SYSTOLIC BLOOD PRESSURE: 108 MMHG | HEART RATE: 73 BPM | HEIGHT: 68 IN | WEIGHT: 240.9 LBS | BODY MASS INDEX: 36.51 KG/M2 | OXYGEN SATURATION: 97 % | DIASTOLIC BLOOD PRESSURE: 76 MMHG

## 2023-10-11 DIAGNOSIS — Z09 HOSPITAL DISCHARGE FOLLOW-UP: ICD-10-CM

## 2023-10-11 DIAGNOSIS — I43 TACHYCARDIA INDUCED CARDIOMYOPATHY (HCC): Primary | ICD-10-CM

## 2023-10-11 DIAGNOSIS — I50.33 ACUTE ON CHRONIC DIASTOLIC (CONGESTIVE) HEART FAILURE (HCC): ICD-10-CM

## 2023-10-11 DIAGNOSIS — I10 PRIMARY HYPERTENSION: ICD-10-CM

## 2023-10-11 DIAGNOSIS — R00.0 TACHYCARDIA INDUCED CARDIOMYOPATHY (HCC): Primary | ICD-10-CM

## 2023-10-11 PROCEDURE — 99214 OFFICE O/P EST MOD 30 MIN: CPT | Performed by: PHYSICIAN ASSISTANT

## 2023-10-11 RX ORDER — METOPROLOL SUCCINATE 50 MG/1
50 TABLET, EXTENDED RELEASE ORAL DAILY
Qty: 30 TABLET | Refills: 3 | Status: SHIPPED | OUTPATIENT
Start: 2023-10-11 | End: 2024-02-08

## 2023-10-11 NOTE — PATIENT INSTRUCTIONS
Continue current medications. Get labs today. Please weigh yourself every day (after emptying your bladder) and keep a detailed log of weights. Contact the Heart Failure program at [phone] if you gain 3+ lbs overnight or 5+ lbs in 5-7 days. Limit daily sodium/salt intake to 2000 mg daily to prevent fluid retention. Avoid canned foods, fast food/Chinese food, and processed meats (hot dogs, lunch meat, and sausage etc.). Caution with condiments. Limit fluid intake to 2000 mL or 2 liters (about 60-65 ounces) daily. Avoid electrolyte replacement drinks (such as Gatorade, Pedialyte, Propel, Liquid IV, etc.). Bring complete list of medications and log of daily weights to your follow-up appointment.

## 2023-10-13 ENCOUNTER — PATIENT OUTREACH (OUTPATIENT)
Dept: CASE MANAGEMENT | Facility: HOSPITAL | Age: 58
End: 2023-10-13

## 2023-10-13 NOTE — PROGRESS NOTES
Heart Failure Outpatient Care Coordinator Note: Texted patient to follow up on her reaching out to [address] [place] [address] for assistance with housing and she responded that for now she is staying with a friend and will let me know if/when things change.

## 2023-10-17 ENCOUNTER — PATIENT OUTREACH (OUTPATIENT)
Dept: CASE MANAGEMENT | Facility: OTHER | Age: 58
End: 2023-10-17

## 2023-10-17 NOTE — PROGRESS NOTES
In [employer] received. Chart Review completed . Patient hasn't followed up with [facility] regarding housing. Magy Raya has told Jerome that she Is staying with a girlfriend at this time and will follow up in the future if needs change. AHLEY SMITH will be closing patient at this time. No other needs noted.

## 2023-10-18 ENCOUNTER — PATIENT OUTREACH (OUTPATIENT)
Dept: CASE MANAGEMENT | Facility: HOSPITAL | Age: 58
End: 2023-10-18

## 2023-10-18 NOTE — PROGRESS NOTES
Heart Failure Outpatient Care Coordinator Note: Chart notes reviewed and call made to patient. She report doing well. Denies CP, SOB or edema and states her weight is down 3#  to 235# which she relates to decreased appetite from "my diverticulitis". States off and on diffuse abdominal pain. Sees GI in a few weeks. Has plans for Knee surgery in December. Reports adherence to her medication, diet and fluid restriction. She states she and her  are still staying with a friend "for now" and hope to find a 1 BR soon. Patient stated did not hear about SNAP benefits and reminded her that I had texted her Corey Burgerack the [employer] phone # and she should call her. Also reminded patient to get lab work and we reviewed upcoming appointments. Will follow up.

## 2023-10-20 ENCOUNTER — APPOINTMENT (OUTPATIENT)
Dept: LAB | Facility: CLINIC | Age: 58
End: 2023-10-20
Payer: COMMERCIAL

## 2023-10-20 ENCOUNTER — TELEPHONE (OUTPATIENT)
Dept: INPATIENT UNIT | Facility: HOSPITAL | Age: 58
End: 2023-10-20

## 2023-10-20 ENCOUNTER — TELEPHONE (OUTPATIENT)
Dept: NEPHROLOGY | Facility: CLINIC | Age: 58
End: 2023-10-20

## 2023-10-20 DIAGNOSIS — I43 TACHYCARDIA INDUCED CARDIOMYOPATHY (HCC): ICD-10-CM

## 2023-10-20 DIAGNOSIS — I50.33 ACUTE ON CHRONIC DIASTOLIC CHF (CONGESTIVE HEART FAILURE) (HCC): Chronic | ICD-10-CM

## 2023-10-20 DIAGNOSIS — E87.6 HYPOKALEMIA: ICD-10-CM

## 2023-10-20 DIAGNOSIS — N18.4 STAGE 4 CHRONIC KIDNEY DISEASE (HCC): ICD-10-CM

## 2023-10-20 DIAGNOSIS — R00.0 TACHYCARDIA INDUCED CARDIOMYOPATHY (HCC): ICD-10-CM

## 2023-10-20 DIAGNOSIS — I48.0 PAROXYSMAL A-FIB (HCC): ICD-10-CM

## 2023-10-20 LAB
ANION GAP SERPL CALCULATED.3IONS-SCNC: 7 MMOL/L
BUN SERPL-MCNC: 24 MG/DL (ref 5–25)
CALCIUM SERPL-MCNC: 9.1 MG/DL (ref 8.4–10.2)
CHLORIDE SERPL-SCNC: 100 MMOL/L (ref 96–108)
CO2 SERPL-SCNC: 34 MMOL/L (ref 21–32)
CREAT SERPL-MCNC: 2.57 MG/DL (ref 0.6–1.3)
GFR SERPL CREATININE-BSD FRML MDRD: 19 ML/MIN/1.73SQ M
GLUCOSE P FAST SERPL-MCNC: 91 MG/DL (ref 65–99)
MAGNESIUM SERPL-MCNC: 2 MG/DL (ref 1.9–2.7)
POTASSIUM SERPL-SCNC: 3.4 MMOL/L (ref 3.5–5.3)
SODIUM SERPL-SCNC: 141 MMOL/L (ref 135–147)

## 2023-10-20 PROCEDURE — 80048 BASIC METABOLIC PNL TOTAL CA: CPT

## 2023-10-20 PROCEDURE — 36415 COLL VENOUS BLD VENIPUNCTURE: CPT

## 2023-10-20 PROCEDURE — 83735 ASSAY OF MAGNESIUM: CPT

## 2023-10-20 RX ORDER — POTASSIUM CHLORIDE 20 MEQ/1
20 TABLET, EXTENDED RELEASE ORAL 2 TIMES DAILY
Qty: 180 TABLET | Refills: 3 | Status: SHIPPED | OUTPATIENT
Start: 2023-10-20

## 2023-10-20 NOTE — TELEPHONE ENCOUNTER
Creatinine continue to improve to 2.5. Continue current torsemide. Potassium slightly lower 3.4. Believe she is currently taking potassium chloride 20 meq daily. Please have her increase potassium chloride to 20 meq twice daily. I have sent updated prescription to her pharmacy. She should do repeat serum potassium level in 1 to 2 weeks.

## 2023-10-26 ENCOUNTER — PATIENT OUTREACH (OUTPATIENT)
Dept: CASE MANAGEMENT | Facility: HOSPITAL | Age: 58
End: 2023-10-26

## 2023-10-26 NOTE — PROGRESS NOTES
Heart Failure Outpatient Progress Note - Devan Abdi 62 y.o. female MRN: [id number]    @ Encounter: [id number]      Assessment/Plan:    Patient Active Problem List    Diagnosis Date Noted    Chest pain 10/05/2023    HTN (hypertension) 10/05/2023    Benign hypertension with CKD (chronic kidney disease) stage IV ([address]) 09/26/2023    Facial numbness, resolved 09/13/2023    Thrombocytopenia ([address]) 07/23/2023    Hypokalemia 07/23/2023    Blood in stool 07/16/2023    Abnormal finding on CT scan 05/08/2023    Morbid obesity 01/14/2023    Anemia in stage 4 chronic kidney disease  01/14/2023    Left low back pain 11/05/2022    Acute on chronic heart failure with preserved ejection fraction ([address]) 10/13/2022    Renal cyst 08/12/2022    Diverticulitis of large intestine without perforation or abscess 06/23/2022    Leukopenia 06/18/2022    Nephrolithiasis 06/09/2022    Hepatitis C 01/30/2022    Acute right flank pain 08/15/2021    History of tachycardia induced cardiomyopathy 05/25/2021    Paroxysmal A-fib ([address]) 05/14/2020    Cocaine abuse ([address]) 05/14/2020    Elevated lipase 10/10/2019    Nonischemic nontraumatic myocardial injury due to CHF 09/09/2019    Acute on chronic diastolic congestive heart failure ([address]) 01/23/2019    Headache 12/26/2018    Stage 4 chronic kidney disease ([address]) 12/26/2018    Tobacco abuse 12/26/2018    History of monomorphic ventricular tachycardia, s/p ICD in 2016 07/13/2016    Gastroesophageal reflux disease  03/20/2016    Atypical atrial flutter ([address]) 03/20/2016     Plan:  H/O hypertensive urgency. Well controlled today. History of atrial flutter / atrial fibrillation               [id number] = 3 (sex, HF, HTN). Anticoagulation on Xarelto. S/p unspecified ablation at [facility] in 2015. S/p Afib ablation with PVI in 05/2020. S/p atypical flutter and micro-reentrant atrial tachycardia ablation in 06/2022.               S/p AV node ablation with ICD reprogramming on 05/10/2023. Rate control: metoprolol succinate 50 mg daily. Rhythm control: None. Chronic HFimpEF. Etiology: nonischemic; tachy-mediated in 05/2021 (LVEF 30%) and again in 06/2022 (LVEF 40-45%) in setting of rapid Aflutter. Warm and well compensated on exam today. No med changes. Weight of 235,242 lbs, 236 lbs,  239 lbs on 10/5, 244 lbs 10/6, 240 lbs, today 241                TTE 03/2016: LVEF 60-65%. C 01/07/2019: no obstructive CAD. TTE 04/18/2019: LVEF 65%. TTE 09/18/2020: LVEF 65%. TTE 05/25/2021: LVEF 30%. LVIDd 3.7 cm. Reduced RVSF. Trace MR and TR. TTE 01/31/2022: LVEF 55%. LVIDd 4.8 cm. Grade 2 DD. Mildly reduced RVSF. TTE (limited) 06/19/2022: LVEF 40-45%. LVIDd 4.1 cm.   TTE (limited) 07/28/2022: LVEF 55%. Normal RV. TTE 04/05/2023: LVEF 65%. LVIDd 3.6 cm. Normal RV. Mild TR.   TTE (limited) 05/12/2023: LVEF 65%. Normal RV. NM stress 10/6/23:    No major reversible ischemia/perfusion defect noted. There is significant underlying artifact due to increased extracardiac tracer uptake. Stress ECG: The ECG was not diagnostic due to resting ST-T abnormalities due to AV pacing. Perfusion: Suboptimal imaging due to increased extracardiac tracer uptake. No major reversible ischemia/perfusion defect noted. Stress Function: Left ventricular function post-stress is normal.  Calculated ejection fraction is 55%. Perfusion Defect Conclusion: There is no evidence of transient ischemic dilation (TID). Pharmacotherapies / Neurohormonal Blockade: Doxazosin 2 mg at HS  --Beta Blocker: Metoprolol succinate 50 mg daily  --ARNi / ACEi / ARB: No, CKD precludes. --Aldosterone Antagonist: No, CKD precludes.    --SGLT2 Inhibitor: borderline GFR  --Diuretic: Lasix 100 mg daily with potassium 20 mEq daily, metolazone 5 mg PRN     Sudden Cardiac Death Risk Reduction:  --Medtronic dual chamber ICD in situ since 07/2016. --Interrogation 9/8/23  MDT-DUAL CHAMBER ICD (AAI-DDD MODE) - ACTIVE SYSTEM IS MRI CONDITIONAL   INTERROGATION DONE VIA [place] Atrial fibrillation noted. Rate controlled. Normal device function. Electrical Resynchronization:  --Candidacy for BiV device: RBBB with  ms. Chronic kidney disease, stage IV Baseline creatinine now in the 2's. Follows with Dr. Kayode Gonzalez as outpatient. History of monomorphic ventricular tachycardia: noted on outpatient loop recorder; s/p secondary prevention ICD in 07/2016. Obstructive sleep apnea  Tobacco abuse: currently smoking 3 cigarettes per day. History of cocaine use: last used in 03/2023. Marijuana use:  occasionally     HPI:   Maribell Mathias is a 80-year-old woman with a PMH as above who presents to the office for hospital follow-up. Follows with Dr. Dave Foss. Presented to [facility] on [facility]07[facility]2023 with frequent palpitations. Found to be in rapid atrial flutter in ED, started on amiodarone drip, and cardiology consulted. IV Lasix was started, and amiodarone switched to PO diltiazem (due to prior concern that amiodarone may be pro-arrhythmic for patient). Transferred to [facility] on 05/09 for EP evaluation. Seen by EP and underwent AVN ablation with ICD reprogramming on 05/10/2023. Transitioned to PO Lasix on 05/12. Lost 6 lbs this admission. 05/17/2023: Patient presents with her  for hospital follow-up. Up 7 lbs since discharge. Reports her normal weight prior to her hospitalization was around 322 lbs (confirmed in Epic). With this weight gain, she reports new ABDUL after walking about 1/2 block as well as hand swelling (symptom she has had before when overloaded). Has chronic mild orthopnea, and denies PND. Reports at time of admission was only able to walk a few steps before feeling winded. One month ago, was walking and active with no limitations.  Has only been taking 20 mg Lasix daily (rather than 40 mg) - was using tablets from a prior prescription. Unable to tolerate potassium pills - has been vomiting them up soon after taking. Has good appetite with occasional early satiety. Recent meals included chicken pot pie (at restaurant on Mother's Day) and salads with ranch or [place] dressing. Does not add salt to foods; uses Mrs. Matos only. 5/31/23. Presents for 2 week follow up. Was only taking half of her Lasix dose at last visit and weight was up significantly. Today she reports taking 40 mg of Lasix since last visit and  weight is back down to baseline. However patient is markedly hypertensive in clinic today with systolic over 170. Tells me the last 4-5 days she has been checking at home and getting readings as high as 891, systolic. Has developed headaches and now experiencing  orthopnea with PND over past week. She has been adherent to medical therapy. Still smoking a few cigarettes per day and occasional marijuana. Sent to hospital for admission for hypertensive urgency (was admitted for 28 hours). Admitted as inpatient (not observation) to [facility] from 07/16 to 07/17/2023 after presenting with chest pain, ABDUL, and LE swelling. BP in ED of 172/98 mmHg. Cardiac work-up unremarkable. . Received one dose IV Lasix and was discharged home.      07/21/2023: Patient presents for hospital follow-up. Up 4-5 lbs since discharge with worsening SOB and LE swelling. Has chronic mild orthopnea but reports worsening in severity and frequency as well as new PND. Denies missing any medications doses. In fact took an extra 40 mg Lasix on 07/18 and again on 07/20 due to weight gain on home scale with minimal improvement in symptoms and no resultant weight loss. Denies any recent dietary indiscretion. Has been admitted multiple times over the past couple months for ADHF, most recently at [facility] from 10/4-10/7.  Seen by cardiology and nephrology, required IV diuresis and discharged on torsemide 100 mg daily with PRN metolazone. Noted to have an DANIELA on arrival, with creatinine 3.4 on arrival and trending down to 2.75 on discharge. Stress test performed inpatient unrevealing for ischemia. 10/11/23: Presents today for follow up. Feeling good, breathing well, denies LE swelling, PND. No orthopnea. Feels much improved after recent admission, taking torsemide 100 mg QD and feels like she's urinating well with this. Amenable to frequent follow ups. Denies any cardiac complaints today. 10/26/23. Presents for follow up. Feeling well. Trying to lose weight. Down to 4 cigarettes per day. BP well controlled. Has total knee replacement surgery coming up in early December. Can walk at least 3 blocks without any SOB. Most limiting factor is her right knee pain.        Past Medical History:   Diagnosis Date    ACS (acute coronary syndrome) ([address]) 02/07/2021    Acute on chronic diastolic CHF 30/96/2114    Anemia 01/14/2023    Arthritis     Atrial fibrillation with rapid ventricular response ([address]) 03/20/2016    Breast lump     CKD (chronic kidney disease) stage 3, GFR 30-59 ml/min ([facility])     Disease of thyroid gland     Elevated troponin 09/09/2019    Epigastric abdominal tenderness 08/15/2021    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 ([address]) 05/25/2020    GERD (gastroesophageal reflux disease)     H/O transfusion 1987    Hepatitis C     resolved    History of tachycardia induced cardiomyopathy 05/2021    in setting of rapid atrial flutter in 05/2021 and again 06/2022    History of ventricular tachycardia 07/2016    s/p ICD    Hyperlipidemia     Hypertension     Hypokalemia 07/23/2023    Inappropriate ICD discharge for atrial flutter 04/2023    NSTEMI (non-ST elevated myocardial infarction) ([address]) 12/26/2018    Sleep apnea     no cpap       12 point ROS negative other than that stated in HPI    Allergies   Allergen Reactions    Coconut Oil - Food Allergy Hives    Iodinated Contrast Media Hives Tape  [Medical Tape] Hives     . Current Outpatient Medications:     Diclofenac Sodium (VOLTAREN) 1 %, Apply 2 g topically every 6 (six) hours as needed (pain), Disp: 100 g, Rfl: 0    doxazosin (CARDURA) 2 mg tablet, take 1 tablet by mouth daily at bedtime, Disp: 30 tablet, Rfl: 5    ferrous gluconate (FERGON) 240 (27 FE) MG tablet, Take 0.5 tablets (120 mg total) by mouth 3 (three) times a week (Patient taking differently: Take 120 mg by mouth 3 (three) times a week MW), Disp: 6 tablet, Rfl: 0    metolazone (ZAROXOLYN) 5 mg tablet, Take 1 tablet (5 mg total) by mouth if needed (as needed for weight gain >3 lbs in 1 day or >5 lbs in 2-3 days), Disp: 10 tablet, Rfl: 0    metoprolol succinate (TOPROL-XL) 50 mg 24 hr tablet, Take 1 tablet (50 mg total) by mouth daily, Disp: 30 tablet, Rfl: 3    nicotine (NICODERM CQ) 7 mg/24hr TD 24 hr patch, Place 1 patch on the skin over 24 hours daily Apply a new patch every 24 hours to a clean, dry, hairless site on the upper arm or hip.  (Patient not taking: Reported on 9/26/2023), Disp: 28 patch, Rfl: 0    pantoprazole (PROTONIX) 40 mg tablet, take 1 tablet by mouth once daily, Disp: 30 tablet, Rfl: 2    potassium chloride (K-DUR,KLOR-CON) 20 mEq tablet, Take 1 tablet (20 mEq total) by mouth 2 (two) times a day, Disp: 180 tablet, Rfl: 3    rivaroxaban (XARELTO) 15 mg tablet, Take 1 tablet (15 mg total) by mouth every evening, Disp: 30 tablet, Rfl: 11    torsemide (DEMADEX) 100 mg tablet, Take 1 tablet (100 mg total) by mouth daily Do not start before October 8, 2023., Disp: 30 tablet, Rfl: 0    Social History     Socioeconomic History    Marital status: /Civil Union     Spouse name: Not on file    Number of children: Not on file    Years of education: 12    Highest education level: Not on file   Occupational History    Not on file   Tobacco Use    Smoking status: Every Day     Packs/day: 0.50     Years: 35.00     Total pack years: 17.50     Types: Cigarettes Smokeless tobacco: Never   Vaping Use    Vaping Use: Never used   Substance and Sexual Activity    Alcohol use: Not Currently    Drug use: Yes     Types: Marijuana    Sexual activity: Yes     Partners: Male     Birth control/protection: None   Other Topics Concern    Not on file   Social History Narrative    Disabled        · Do you currently or have you served in the [address] [place] [address] Street:   No      · Were you activated, into active duty, as a member of the [employer] or as a Reservist:   No      · Diet:   Regular      · General stress level:   High      · Has smoked since age:   12      · Caffeine intake: Moderate      · Guns present in home:   No      · Seat belts used routinely:   Yes      · Sunscreen used routinely:   No      · Advance directive:   No      · Live alone or with others:   with others      · International travel:   no      · Pets:   No      · Blind or serious difficulty seeing: Yes     · Difficulty concentrating, remembering or making decisions:   No      · Difficulty walking or climbing stairs: Yes      · Difficulty dressing or bathing:   No      · Difficulty doing errands alone:   No      · What is the highest grade or level of school you have completed or the highest degree you have received:   12th grade, no diploma      · How many days of moderate to strenuous exercise, like a brisk walk, did you do in the last 7 days:   0      · How hard is it for you to pay for the very basics like food, housing, medical care, and heating:   Somewhat hard      · Do you feel stress - tense, restless, nervous, or anxious, or unable to sleep at night because your mind is troubled all the time - these days:    Only a little          Social Determinants of Health     Financial Resource Strain: Not on file   Food Insecurity: No Food Insecurity (10/5/2023)    Hunger Vital Sign     Worried About Running Out of Food in the Last Year: Never true     Ran Out of Food in the Last Year: Never true   Transportation Needs: No Transportation Needs (10/5/2023)    PRAPARE - Transportation     Lack of Transportation (Medical): No     Lack of Transportation (Non-Medical):  No   Physical Activity: Not on file   Stress: Not on file   Social Connections: Not on file   Intimate Partner Violence: Not on file   Housing Stability: High Risk (10/5/2023)    Housing Stability Vital Sign     Unable to Pay for Housing in the Last Year: No     Number of Places Lived in the Last Year: 1     Unstable Housing in the Last Year: Yes       Family History   Problem Relation Age of Onset    Arthritis Family     Cancer Family     Diabetes Family     Hypertension Family     Cancer Maternal Grandmother        Physical Exam:    Vitals /78 (BP Location: Left arm, Patient Position: Sitting, Cuff Size: Large)   Pulse 82   Wt 109 kg (241 lb 1.6 oz)   SpO2 100%   BMI 37.20 kg/m²     Wt Readings from Last 3 Encounters:   10/11/23 109 kg (240 lb 14.4 oz)   10/06/23 111 kg (244 lb 11.4 oz)   09/26/23 108 kg (237 lb)         GEN: Chantell Tejada appears well, alert and oriented x 3, pleasant and cooperative   HEENT: pupils equal, round, and reactive to light; extraocular muscles intact  NECK: supple, no carotid bruits   HEART: irregular rhythm, normal S1 and S2, no murmurs, clicks, gallops or rubs, JVP is flat  LUNGS: clear to auscultation bilaterally; no wheezes, rales, or rhonchi   ABDOMEN: normal bowel sounds, soft, no tenderness, no distention  EXTREMITIES: peripheral pulses normal; no clubbing, cyanosis, or edema  NEURO: no focal findings   SKIN: normal without suspicious lesions on exposed skin    Labs & Results:    Chemistry        Component Value Date/Time    K 3.4 (L) 10/20/2023 0954     10/20/2023 0954    CO2 34 (H) 10/20/2023 0954    CO2 34 (H) 07/25/2021 1835    BUN 24 10/20/2023 0954    CREATININE 2.57 (H) 10/20/2023 0954        Component Value Date/Time    CALCIUM 9.1 10/20/2023 0954    ALKPHOS 65 10/04/2023 2224    AST 23 10/04/2023 2224    ALT 19 10/04/2023 2224        Lab Results   Component Value Date    WBC 3.92 (L) 10/07/2023    HGB 12.0 10/07/2023    HCT 38.2 10/07/2023    MCV 84 10/07/2023     10/07/2023     Lab Results   Component Value Date    NTBNP 9,053 (H) 01/13/2023      Lab Results   Component Value Date    NTBNP 9,053 (H) 01/13/2023      Lab Results   Component Value Date    LDLCALC 68 04/05/2023     Lab Results   Component Value Date    [id number] 1.676 07/16/2023         EKG personally reviewed by CHARLOTTE Garcia. No results found for this visit on 10/27/23. Counseling / Coordination of Care  Total floor / unit time spent today 40 minutes. Greater than 50% of total time was spent with the patient and / or family counseling and / or coordination of care. A description of the counseling / coordination of care: 20. Thank you for the opportunity to participate in the care of this patient.     Ema Bernardo

## 2023-10-26 NOTE — PROGRESS NOTES
Heart Failure Outpatient Care Coordinator Note:Chart notes reviewed and call made to patient and left message.

## 2023-10-27 ENCOUNTER — PATIENT OUTREACH (OUTPATIENT)
Dept: CASE MANAGEMENT | Facility: HOSPITAL | Age: 58
End: 2023-10-27

## 2023-10-27 ENCOUNTER — OFFICE VISIT (OUTPATIENT)
Dept: CARDIOLOGY CLINIC | Facility: CLINIC | Age: 58
End: 2023-10-27
Payer: COMMERCIAL

## 2023-10-27 VITALS
DIASTOLIC BLOOD PRESSURE: 78 MMHG | BODY MASS INDEX: 37.2 KG/M2 | OXYGEN SATURATION: 100 % | WEIGHT: 241.1 LBS | HEART RATE: 82 BPM | SYSTOLIC BLOOD PRESSURE: 122 MMHG

## 2023-10-27 DIAGNOSIS — I50.33 ACUTE ON CHRONIC DIASTOLIC (CONGESTIVE) HEART FAILURE (HCC): ICD-10-CM

## 2023-10-27 DIAGNOSIS — I50.33 ACUTE ON CHRONIC HEART FAILURE WITH PRESERVED EJECTION FRACTION (HCC): ICD-10-CM

## 2023-10-27 PROCEDURE — 99215 OFFICE O/P EST HI 40 MIN: CPT | Performed by: NURSE PRACTITIONER

## 2023-10-27 RX ORDER — METOPROLOL SUCCINATE 50 MG/1
50 TABLET, EXTENDED RELEASE ORAL DAILY
Qty: 30 TABLET | Refills: 3 | Status: SHIPPED | OUTPATIENT
Start: 2023-10-27 | End: 2024-02-24

## 2023-10-27 RX ORDER — NYSTATIN 100000 [USP'U]/G
POWDER TOPICAL DAILY
Qty: 1 G | Refills: 1 | Status: SHIPPED | OUTPATIENT
Start: 2023-10-27

## 2023-10-27 RX ORDER — TORSEMIDE 100 MG/1
100 TABLET ORAL DAILY
Qty: 30 TABLET | Refills: 3 | Status: SHIPPED | OUTPATIENT
Start: 2023-10-27

## 2023-10-27 NOTE — PROGRESS NOTES
Heart Failure Outpatient Care Coordinator Note: Chart notes reviewed. Patient saw HF NP today and no concerns or med changes noted. Patient  feels that her current diuretic has been working well and home weights steady 236-237#. She denies any symptoms.  has stable housing for now" with a friend and also her  has gotten another job and they continue to look for an affordable apartment.

## 2023-10-27 NOTE — PATIENT INSTRUCTIONS
Weigh yourself daily  If you gain 3 lbs in one day or 5 lbs in one week, please call the office at [phone] and ask for a nurse or the heart failure nurse  Keep your sodium intake to <2 grams, (2000 mg) per day, and fluids <2 Liters (2000 ml) per day.  This is around 6-7, 8 oz glasses of fluid per day    Continue medications as you are

## 2023-11-01 ENCOUNTER — TELEPHONE (OUTPATIENT)
Dept: OTHER | Facility: HOSPITAL | Age: 58
End: 2023-11-01

## 2023-11-01 ENCOUNTER — OFFICE VISIT (OUTPATIENT)
Dept: GASTROENTEROLOGY | Facility: CLINIC | Age: 58
End: 2023-11-01
Payer: COMMERCIAL

## 2023-11-01 ENCOUNTER — TELEPHONE (OUTPATIENT)
Dept: GASTROENTEROLOGY | Facility: CLINIC | Age: 58
End: 2023-11-01

## 2023-11-01 ENCOUNTER — APPOINTMENT (OUTPATIENT)
Dept: LAB | Facility: CLINIC | Age: 58
End: 2023-11-01
Payer: COMMERCIAL

## 2023-11-01 VITALS
SYSTOLIC BLOOD PRESSURE: 159 MMHG | BODY MASS INDEX: 35.92 KG/M2 | DIASTOLIC BLOOD PRESSURE: 110 MMHG | HEART RATE: 82 BPM | HEIGHT: 68 IN | WEIGHT: 237 LBS

## 2023-11-01 DIAGNOSIS — I48.0 PAROXYSMAL A-FIB (HCC): ICD-10-CM

## 2023-11-01 DIAGNOSIS — R19.7 DIARRHEA, UNSPECIFIED TYPE: ICD-10-CM

## 2023-11-01 DIAGNOSIS — I50.33 ACUTE ON CHRONIC DIASTOLIC CHF (CONGESTIVE HEART FAILURE) (HCC): Chronic | ICD-10-CM

## 2023-11-01 DIAGNOSIS — K21.9 GASTROESOPHAGEAL REFLUX DISEASE WITHOUT ESOPHAGITIS: Chronic | ICD-10-CM

## 2023-11-01 DIAGNOSIS — I43 TACHYCARDIA INDUCED CARDIOMYOPATHY (HCC): ICD-10-CM

## 2023-11-01 DIAGNOSIS — R11.2 NAUSEA AND VOMITING, UNSPECIFIED VOMITING TYPE: ICD-10-CM

## 2023-11-01 DIAGNOSIS — R10.32 LLQ PAIN: Primary | ICD-10-CM

## 2023-11-01 DIAGNOSIS — R00.0 TACHYCARDIA INDUCED CARDIOMYOPATHY (HCC): ICD-10-CM

## 2023-11-01 DIAGNOSIS — N18.4 STAGE 4 CHRONIC KIDNEY DISEASE (HCC): ICD-10-CM

## 2023-11-01 DIAGNOSIS — E87.6 HYPOKALEMIA: Primary | ICD-10-CM

## 2023-11-01 DIAGNOSIS — K57.32 DIVERTICULITIS OF LARGE INTESTINE WITHOUT PERFORATION OR ABSCESS WITHOUT BLEEDING: ICD-10-CM

## 2023-11-01 DIAGNOSIS — B18.2 CHRONIC HEPATITIS C WITHOUT HEPATIC COMA (HCC): ICD-10-CM

## 2023-11-01 DIAGNOSIS — E87.6 HYPOKALEMIA: ICD-10-CM

## 2023-11-01 LAB — POTASSIUM SERPL-SCNC: 3.3 MMOL/L (ref 3.5–5.3)

## 2023-11-01 PROCEDURE — 99214 OFFICE O/P EST MOD 30 MIN: CPT | Performed by: NURSE PRACTITIONER

## 2023-11-01 PROCEDURE — 36415 COLL VENOUS BLD VENIPUNCTURE: CPT

## 2023-11-01 PROCEDURE — 84132 ASSAY OF SERUM POTASSIUM: CPT

## 2023-11-01 RX ORDER — POTASSIUM CHLORIDE 20 MEQ/1
20 TABLET, EXTENDED RELEASE ORAL 3 TIMES DAILY
Qty: 270 TABLET | Refills: 3 | Status: SHIPPED | OUTPATIENT
Start: 2023-11-01

## 2023-11-01 RX ORDER — ONDANSETRON 4 MG/1
4 TABLET, ORALLY DISINTEGRATING ORAL EVERY 6 HOURS PRN
Qty: 20 TABLET | Refills: 0 | Status: SHIPPED | OUTPATIENT
Start: 2023-11-01

## 2023-11-01 NOTE — TELEPHONE ENCOUNTER
Cardiac clearance faxed to Dr Salma Briggs office.  Sb     Scheduled date of EGD/colonoscopy (as of today):12/1/23  Physician performing EGD/colonoscopy:Dr Bruce   Location of EGD/colonoscopy:   Desired bowel prep reviewed with patient:miralax   Instructions reviewed with patient by:sb  Clearances:  Dr Susan Villanueva

## 2023-11-01 NOTE — TELEPHONE ENCOUNTER
Her potassium continues to remain lower despite increasing potassium supplement. Believe currently she is on potassium chloride 20 meq twice daily. Please have her increase this to 40 meq twice daily for next 3 days and then continue 20 meq 3 times daily afterwards going forward. I have updated prescription to the pharmacy. She should have repeat potassium, magnesium level in 10 days.
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
## 2023-11-01 NOTE — PATIENT INSTRUCTIONS
-Get stool studies done  -Schedule CT scan to have this done prior to EGD/Colonoscopy  -Continue Protonix daily  -Our nurse Chloe Henry will reach out regarding Hepatitis C treatment          Hepatitis C   AMBULATORY CARE:   Hepatitis C  is inflammation of the liver caused by hepatitis C virus (HCV) infection. Signs and symptoms: You may not have any signs or symptoms at first. Any of the following may develop if HCV damages your liver:  Fatigue    Dark urine or pale bowel movements    Fever    Jaundice (yellow skin or eyes) and itchy skin    Joint pain, body aches, or weakness    Loss of appetite, nausea, or vomiting    Pain in the upper right side of your abdomen    Seek care immediately if:   You have severe abdominal pain. You are too dizzy to stand up. You vomit blood or material that looks like coffee grounds. You feel confused or are very sleepy. Your bowel movements are red or black, and sticky. Call your doctor or hepatologist if:   You have a fever. You are vomiting and cannot keep food or liquids down. Your abdomen or legs have a rash or are swollen. You are bruising easily. You have questions or concerns about your condition or care. What you need to know about hepatitis C screening:  Screening means you are tested for hepatitis C before you have signs or symptoms. This helps healthcare providers find and treat hepatitis C early. Screening is usually recommended 1 time for all adults who are 25to 78years of age. Screening may also be recommended during pregnancy to lower the risk for HCV being passed from mother to baby. Screening may start before age 25 or after 78 if your risk is high and continue regularly if your risk remains high. Treatment:  Your body may be able to fight an HCV infection on its own. An infection that continues longer than 6 months will need treatment.  Treatment helps prevent health problems hepatitis C can cause, such as liver failure or cirrhosis. You may need any of the following:  Antiviral medicines  help control HCV. These medicines will not get rid of the virus, but they can make it inactive in your body. Antivirals can also shorten the amount of time you have symptoms or make them less severe. You will need to take a combination of antivirals for at least 8 to 12 weeks. Surgery  may be needed to remove part of your liver. A liver transplant may be done if your liver stops working. Your diseased liver is removed and replaced with a healthy, donated liver. Manage hepatitis C:   Do not drink alcohol or use illegal drugs. Alcohol and drugs cause or increase your risk for liver damage. Ask your healthcare provider for more information if you need help quitting. Do not use any medicines without talking to your provider. This includes medicine that has been ordered for you and over-the-counter medicine. Ask before you use acetaminophen, vitamins, herbs, herbal teas, laxatives, or food supplements. Any of these could harm your liver. Do not smoke. Nicotine can damage blood vessels and make it more difficult to manage hepatitis C. Do not use e-cigarettes or smokeless tobacco in place of cigarettes or to help you quit. They still contain nicotine. Ask your provider for information if you currently smoke and need help quitting. Eat a variety of healthy foods. Healthy foods include fruits, vegetables, low-fat dairy products, beans, and lean meats and fish. Ask if you need to be on a special diet. Get more rest.  Slowly return to your normal activities when you feel better. Talk to your provider about vaccines. You may need to get vaccines to protect you from hepatitis A or B. You may also need a pneumonia vaccine. Get the flu vaccine as soon as recommended each year, usually in September or October. Get all recommended COVID-19 vaccine doses and boosters. Ask your provider about other vaccines you need.     Prevent the spread of HCV:  No vaccine is available to prevent hepatitis C. The following can help prevent HCV from spreading to others:  Wash your hands often. Use soap and water. Wash your hands several times each day. Wash after you use the bathroom, change a child's diaper, and before you prepare or eat food. Wash for at least 20 seconds. Rinse with warm, running water for several seconds. Then dry your hands with a clean towel or paper towel. Use hand  that contains alcohol if soap and water are not available. Do not touch your eyes, nose, or mouth without washing your hands first.         Cover any open cuts or scratches. If blood from your wound gets on a surface, clean the surface with bleach right away. Throw away any items with blood or body fluids on them, as directed by your healthcare provider. Do not share personal items. These items include toothbrushes, nail clippers, and razors. Do not share needles. Tell household members and sex partners that you have HCV. They should be tested for HCV. Do not have sex, including oral and anal sex, until your healthcare provider tells you it is okay. If you have sex, make sure the male partner wears a latex condom. Protect your baby. If you are currently pregnant, your provider will give you more information on protecting your baby from [address]. Medicines used to treat hepatitis C are not routinely used during pregnancy. Your provider may recommend antivirals if the benefits to you and your baby outweigh the risks. Your provider will talk to you about the benefits and risks. It is okay to breastfeed your baby unless your nipples are cracked or bleeding. Do not donate blood, body organs, semen, or other tissues. Donations are checked for HCV, but it is best not to donate. Follow up with your doctor or hepatologist as directed: If you took medicine to treat hepatitis C, your blood will be checked for HCV 12 weeks later.  You may need ongoing tests or treatment. Write down your questions so you remember to ask them during your visits. © Copyright Thana Givens 2023 Information is for End User's use only and may not be sold, redistributed or otherwise used for commercial purposes. The above information is an  only. It is not intended as medical advice for individual conditions or treatments. Talk to your doctor, nurse or pharmacist before following any medical regimen to see if it is safe and effective for you. Diverticulitis Diet   WHAT YOU NEED TO KNOW:   What is a diverticulitis diet? A diverticulitis diet includes foods that allow your intestines to rest while you have diverticulitis. Diverticulitis is a condition that causes diverticula (small pockets) along your intestine to become inflamed or infected. This is caused by hard bowel movement, food, or bacteria that get stuck in the pockets. Which foods may be recommended while I have diverticulitis? A clear liquid diet may be recommended for 2 to 3 days. A clear liquid diet includes clear liquids, and foods that are liquid at room temperature. Examples include the following:     Water and clear juices (such as apple, cranberry, or grape), strained citrus juices or fruit punch    Coffee or tea (without cream or milk)    Clear sports drinks or soft drinks, such as ginger ale, lemon-lime soda, or club soda (no cola or root beer)    Clear broth, bouillon, or consommé    Plain popsicles (no popsicles with pureed fruit or fiber)    Flavored gelatin without fruit    Low-fiber foods may be recommended until your symptoms improve.   Examples include the following:     Cream of wheat and finely ground grits    White bread, white pasta, and white rice    Canned and well-cooked fruit without skins or seeds, and juice without pulp    Canned and well-cooked vegetables without skins or seeds, and vegetable juice    Cow's milk, lactose-free milk, soy milk, and rice milk    Yogurt, cottage cheese, and sherbet    Eggs, poultry (such as chicken and turkey), fish, and tender, ground, well-cooked beef     Tofu and smooth nut butters, such as peanut butter    Broth and strained soups made of low-fiber foods    What do I need to know about high-fiber foods? High-fiber foods can help prevent diverticulosis and diverticulitis. Your healthcare provider will tell you when you can add high-fiber foods back into your diet. Examples include the following:  Whole grains and breads, and cereals made with whole grains    Dried fruit, fresh fruit with skin, and fruit pulp    Raw vegetables    Cooked greens, such as spinach    Tough meat and meat with gristle    Legumes, such as [place] beans and lentils       When should I call my doctor? Your symptoms get worse or do not go away. You have questions about the foods you should eat. You have questions or concerns about your condition or care. CARE AGREEMENT:   You have the right to help plan your care. Learn about your health condition and how it may be treated. Discuss treatment options with your healthcare providers to decide what care you want to receive. You always have the right to refuse treatment. The above information is an  only. It is not intended as medical advice for individual conditions or treatments. Talk to your doctor, nurse or pharmacist before following any medical regimen to see if it is safe and effective for you. © Copyright Kylie Wilks 2023 Information is for End User's use only and may not be sold, redistributed or otherwise used for commercial purposes.

## 2023-11-03 ENCOUNTER — TELEPHONE (OUTPATIENT)
Dept: GASTROENTEROLOGY | Facility: CLINIC | Age: 58
End: 2023-11-03

## 2023-11-03 DIAGNOSIS — B18.2 CHRONIC HEPATITIS C WITHOUT HEPATIC COMA (HCC): Primary | ICD-10-CM

## 2023-11-03 NOTE — TELEPHONE ENCOUNTER
11/1/23 office visit -hepatitis c treatment. Patient will complete required blood work for hepatitis c treatment. Discussed treatment options and medication approval process/delivery.

## 2023-11-10 ENCOUNTER — HOSPITAL ENCOUNTER (EMERGENCY)
Facility: HOSPITAL | Age: 58
Discharge: HOME | End: 2023-11-10
Attending: EMERGENCY MEDICINE
Payer: COMMERCIAL

## 2023-11-10 ENCOUNTER — APPOINTMENT (EMERGENCY)
Dept: CT IMAGING | Facility: HOSPITAL | Age: 58
End: 2023-11-10
Payer: COMMERCIAL

## 2023-11-10 VITALS
DIASTOLIC BLOOD PRESSURE: 95 MMHG | RESPIRATION RATE: 18 BRPM | HEART RATE: 83 BPM | OXYGEN SATURATION: 100 % | TEMPERATURE: 97.6 F | SYSTOLIC BLOOD PRESSURE: 160 MMHG

## 2023-11-10 DIAGNOSIS — L03.213 PRESEPTAL CELLULITIS OF LEFT EYE: Primary | ICD-10-CM

## 2023-11-10 PROCEDURE — 99284 EMERGENCY DEPT VISIT MOD MDM: CPT | Performed by: EMERGENCY MEDICINE

## 2023-11-10 PROCEDURE — 99283 EMERGENCY DEPT VISIT LOW MDM: CPT

## 2023-11-10 PROCEDURE — 70486 CT MAXILLOFACIAL W/O DYE: CPT

## 2023-11-10 RX ORDER — AMOXICILLIN AND CLAVULANATE POTASSIUM 875; 125 MG/1; MG/1
1 TABLET, FILM COATED ORAL EVERY 12 HOURS
Qty: 14 TABLET | Refills: 0 | Status: SHIPPED | OUTPATIENT
Start: 2023-11-10 | End: 2023-11-17

## 2023-11-10 RX ORDER — AMOXICILLIN AND CLAVULANATE POTASSIUM 875; 125 MG/1; MG/1
1 TABLET, FILM COATED ORAL ONCE
Status: COMPLETED | OUTPATIENT
Start: 2023-11-10 | End: 2023-11-10

## 2023-11-10 RX ADMIN — AMOXICILLIN AND CLAVULANATE POTASSIUM 1 TABLET: 875; 125 TABLET, FILM COATED ORAL at 12:25
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
## 2023-11-13 ENCOUNTER — HOSPITAL ENCOUNTER (EMERGENCY)
Facility: HOSPITAL | Age: 58
Discharge: HOME | End: 2023-11-13
Attending: EMERGENCY MEDICINE
Payer: COMMERCIAL

## 2023-11-13 ENCOUNTER — APPOINTMENT (EMERGENCY)
Dept: RADIOLOGY | Facility: HOSPITAL | Age: 58
End: 2023-11-13
Payer: COMMERCIAL

## 2023-11-13 VITALS
RESPIRATION RATE: 18 BRPM | OXYGEN SATURATION: 98 % | SYSTOLIC BLOOD PRESSURE: 146 MMHG | WEIGHT: 233.69 LBS | TEMPERATURE: 98.2 F | HEART RATE: 71 BPM | DIASTOLIC BLOOD PRESSURE: 91 MMHG | BODY MASS INDEX: 36.06 KG/M2

## 2023-11-13 DIAGNOSIS — M72.2 PLANTAR FASCIITIS: Primary | ICD-10-CM

## 2023-11-13 PROCEDURE — 99283 EMERGENCY DEPT VISIT LOW MDM: CPT

## 2023-11-13 PROCEDURE — 73650 X-RAY EXAM OF HEEL: CPT

## 2023-11-13 PROCEDURE — 99284 EMERGENCY DEPT VISIT MOD MDM: CPT

## 2023-11-13 RX ORDER — PREDNISONE 50 MG/1
50 TABLET ORAL DAILY
Qty: 5 TABLET | Refills: 0 | Status: SHIPPED | OUTPATIENT
Start: 2023-11-13

## 2023-11-13 RX ORDER — OXYCODONE HYDROCHLORIDE AND ACETAMINOPHEN 5; 325 MG/1; MG/1
1 TABLET ORAL ONCE
Status: COMPLETED | OUTPATIENT
Start: 2023-11-13 | End: 2023-11-13

## 2023-11-13 RX ORDER — ACETAMINOPHEN 325 MG/1
650 TABLET ORAL ONCE
Status: COMPLETED | OUTPATIENT
Start: 2023-11-13 | End: 2023-11-13

## 2023-11-13 RX ORDER — ACETAMINOPHEN 325 MG/1
650 TABLET ORAL ONCE
Status: DISCONTINUED | OUTPATIENT
Start: 2023-11-13 | End: 2023-11-13

## 2023-11-13 RX ADMIN — ACETAMINOPHEN 650 MG: 325 TABLET, FILM COATED ORAL at 15:26

## 2023-11-13 RX ADMIN — OXYCODONE HYDROCHLORIDE AND ACETAMINOPHEN 1 TABLET: 5; 325 TABLET ORAL at 16:25

## 2023-11-13 NOTE — DISCHARGE INSTRUCTIONS
Contact orthopedics for follow-up if pain persists. Practice exercises for plantar fascitis. Complete course of prednisone. Take tylenol as needed for pain.
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
## 2023-11-14 ENCOUNTER — TELEPHONE (OUTPATIENT)
Dept: GASTROENTEROLOGY | Facility: CLINIC | Age: 58
End: 2023-11-14

## 2023-11-16 ENCOUNTER — NURSE TRIAGE (OUTPATIENT)
Age: 58
End: 2023-11-16

## 2023-11-16 NOTE — TELEPHONE ENCOUNTER
Pt returning call to melissa, tried to transfer but system was disconnected.  Please call back at [phone]

## 2023-11-19 ENCOUNTER — HOSPITAL ENCOUNTER (EMERGENCY)
Facility: HOSPITAL | Age: 58
Discharge: LEFT BEFORE DISCHARGE | End: 2023-11-19
Payer: COMMERCIAL

## 2023-11-19 VITALS
SYSTOLIC BLOOD PRESSURE: 142 MMHG | RESPIRATION RATE: 18 BRPM | BODY MASS INDEX: 36.06 KG/M2 | OXYGEN SATURATION: 97 % | WEIGHT: 233.69 LBS | DIASTOLIC BLOOD PRESSURE: 82 MMHG | TEMPERATURE: 97.8 F | HEART RATE: 72 BPM

## 2023-11-19 PROCEDURE — 93005 ELECTROCARDIOGRAM TRACING: CPT

## 2023-11-20 LAB
ATRIAL RATE: 68 BPM
P AXIS: 84 DEGREES
PR INTERVAL: 180 MS
QRS AXIS: -78 DEGREES
QRSD INTERVAL: 192 MS
QT INTERVAL: 492 MS
QTC INTERVAL: 523 MS
T WAVE AXIS: 92 DEGREES
VENTRICULAR RATE: 68 BPM

## 2023-11-20 PROCEDURE — 93010 ELECTROCARDIOGRAM REPORT: CPT | Performed by: INTERNAL MEDICINE

## 2023-11-22 ENCOUNTER — PATIENT OUTREACH (OUTPATIENT)
Dept: CASE MANAGEMENT | Facility: HOSPITAL | Age: 58
End: 2023-11-22

## 2023-11-22 NOTE — PROGRESS NOTES
Heart Failure Outpatient Care Coordinator Note: Chart notes reviewed and call made to patient. She reports is feeling "pretty good". States her weights have been stable around 230# and feels current diuretic regimen is keeping the fluid off. Denies edema or SOB. Discussed ED use and alternative options for various minor complaints. States having knee surgery mid December . Has my contact information and reinforced to call if needed.

## 2023-11-24 ENCOUNTER — APPOINTMENT (OUTPATIENT)
Dept: LAB | Facility: CLINIC | Age: 58
End: 2023-11-24
Payer: COMMERCIAL

## 2023-11-24 DIAGNOSIS — B18.2 CHRONIC HEPATITIS C WITHOUT HEPATIC COMA (HCC): ICD-10-CM

## 2023-11-24 LAB
ALBUMIN SERPL BCP-MCNC: 4.2 G/DL (ref 3.5–5)
ALP SERPL-CCNC: 73 U/L (ref 34–104)
ALT SERPL W P-5'-P-CCNC: 16 U/L (ref 7–52)
ANION GAP SERPL CALCULATED.3IONS-SCNC: 8 MMOL/L
AST SERPL W P-5'-P-CCNC: 19 U/L (ref 13–39)
BASOPHILS # BLD AUTO: 0.02 THOUSANDS/ÂΜL (ref 0–0.1)
BASOPHILS NFR BLD AUTO: 0 % (ref 0–1)
BILIRUB SERPL-MCNC: 0.5 MG/DL (ref 0.2–1)
BUN SERPL-MCNC: 30 MG/DL (ref 5–25)
CALCIUM SERPL-MCNC: 9.7 MG/DL (ref 8.4–10.2)
CHLORIDE SERPL-SCNC: 96 MMOL/L (ref 96–108)
CO2 SERPL-SCNC: 34 MMOL/L (ref 21–32)
CREAT SERPL-MCNC: 2.22 MG/DL (ref 0.6–1.3)
EOSINOPHIL # BLD AUTO: 0.17 THOUSAND/ÂΜL (ref 0–0.61)
EOSINOPHIL NFR BLD AUTO: 2 % (ref 0–6)
ERYTHROCYTE [DISTWIDTH] IN BLOOD BY AUTOMATED COUNT: 14.5 % (ref 11.6–15.1)
GFR SERPL CREATININE-BSD FRML MDRD: 23 ML/MIN/1.73SQ M
GLUCOSE SERPL-MCNC: 105 MG/DL (ref 65–140)
HAV AB SER QL IA: NORMAL
HBV CORE AB SER QL: NORMAL
HBV SURFACE AB SER-ACNC: <3 MIU/ML
HBV SURFACE AG SER QL: NORMAL
HCT VFR BLD AUTO: 43.8 % (ref 34.8–46.1)
HGB BLD-MCNC: 13.9 G/DL (ref 11.5–15.4)
HIV 1+2 AB+HIV1 P24 AG SERPL QL IA: NORMAL
HIV 2 AB SERPL QL IA: NORMAL
HIV1 AB SERPL QL IA: NORMAL
HIV1 P24 AG SERPL QL IA: NORMAL
IMM GRANULOCYTES # BLD AUTO: 0.02 THOUSAND/UL (ref 0–0.2)
IMM GRANULOCYTES NFR BLD AUTO: 0 % (ref 0–2)
INR PPP: 0.99 (ref 0.84–1.19)
LYMPHOCYTES # BLD AUTO: 1.27 THOUSANDS/ÂΜL (ref 0.6–4.47)
LYMPHOCYTES NFR BLD AUTO: 18 % (ref 14–44)
MAGNESIUM SERPL-MCNC: 2.4 MG/DL (ref 1.9–2.7)
MCH RBC QN AUTO: 27.3 PG (ref 26.8–34.3)
MCHC RBC AUTO-ENTMCNC: 31.7 G/DL (ref 31.4–37.4)
MCV RBC AUTO: 86 FL (ref 82–98)
MONOCYTES # BLD AUTO: 0.49 THOUSAND/ÂΜL (ref 0.17–1.22)
MONOCYTES NFR BLD AUTO: 7 % (ref 4–12)
NEUTROPHILS # BLD AUTO: 5.16 THOUSANDS/ÂΜL (ref 1.85–7.62)
NEUTS SEG NFR BLD AUTO: 73 % (ref 43–75)
NRBC BLD AUTO-RTO: 0 /100 WBCS
PLATELET # BLD AUTO: 155 THOUSANDS/UL (ref 149–390)
PMV BLD AUTO: 11.9 FL (ref 8.9–12.7)
POTASSIUM SERPL-SCNC: 3.1 MMOL/L (ref 3.5–5.3)
PROT SERPL-MCNC: 7.7 G/DL (ref 6.4–8.4)
PROTHROMBIN TIME: 13.7 SECONDS (ref 11.6–14.5)
RBC # BLD AUTO: 5.1 MILLION/UL (ref 3.81–5.12)
SODIUM SERPL-SCNC: 138 MMOL/L (ref 135–147)
WBC # BLD AUTO: 7.13 THOUSAND/UL (ref 4.31–10.16)

## 2023-11-24 PROCEDURE — 87340 HEPATITIS B SURFACE AG IA: CPT

## 2023-11-24 PROCEDURE — 83010 ASSAY OF HAPTOGLOBIN QUANT: CPT

## 2023-11-24 PROCEDURE — 86708 HEPATITIS A ANTIBODY: CPT

## 2023-11-24 PROCEDURE — 87902 NFCT AGT GNTYP ALYS HEP C: CPT

## 2023-11-24 PROCEDURE — 84460 ALANINE AMINO (ALT) (SGPT): CPT

## 2023-11-24 PROCEDURE — 80053 COMPREHEN METABOLIC PANEL: CPT

## 2023-11-24 PROCEDURE — 87389 HIV-1 AG W/HIV-1&-2 AB AG IA: CPT

## 2023-11-24 PROCEDURE — 36415 COLL VENOUS BLD VENIPUNCTURE: CPT

## 2023-11-24 PROCEDURE — 82172 ASSAY OF APOLIPOPROTEIN: CPT

## 2023-11-24 PROCEDURE — 87521 HEPATITIS C PROBE&RVRS TRNSC: CPT

## 2023-11-24 PROCEDURE — 82247 BILIRUBIN TOTAL: CPT

## 2023-11-24 PROCEDURE — 83883 ASSAY NEPHELOMETRY NOT SPEC: CPT

## 2023-11-24 PROCEDURE — 85025 COMPLETE CBC W/AUTO DIFF WBC: CPT

## 2023-11-24 PROCEDURE — 82977 ASSAY OF GGT: CPT

## 2023-11-24 PROCEDURE — 86704 HEP B CORE ANTIBODY TOTAL: CPT

## 2023-11-24 PROCEDURE — 86706 HEP B SURFACE ANTIBODY: CPT

## 2023-11-24 PROCEDURE — 85610 PROTHROMBIN TIME: CPT

## 2023-11-24 PROCEDURE — 87522 HEPATITIS C REVRS TRNSCRPJ: CPT

## 2023-11-25 ENCOUNTER — APPOINTMENT (EMERGENCY)
Dept: RADIOLOGY | Facility: HOSPITAL | Age: 58
End: 2023-11-25
Payer: COMMERCIAL

## 2023-11-25 ENCOUNTER — HOSPITAL ENCOUNTER (EMERGENCY)
Facility: HOSPITAL | Age: 58
Discharge: HOME | End: 2023-11-25
Attending: EMERGENCY MEDICINE
Payer: COMMERCIAL

## 2023-11-25 VITALS — HEART RATE: 73 BPM | TEMPERATURE: 98.4 F | OXYGEN SATURATION: 96 % | RESPIRATION RATE: 16 BRPM

## 2023-11-25 DIAGNOSIS — S63.502A SPRAIN OF LEFT WRIST, INITIAL ENCOUNTER: Primary | ICD-10-CM

## 2023-11-25 DIAGNOSIS — M79.643 TENDERNESS OF ANATOMICAL SNUFFBOX: ICD-10-CM

## 2023-11-25 PROCEDURE — 99283 EMERGENCY DEPT VISIT LOW MDM: CPT

## 2023-11-25 PROCEDURE — 73110 X-RAY EXAM OF WRIST: CPT

## 2023-11-25 PROCEDURE — 99284 EMERGENCY DEPT VISIT MOD MDM: CPT | Performed by: EMERGENCY MEDICINE

## 2023-11-25 RX ORDER — ACETAMINOPHEN 325 MG/1
975 TABLET ORAL ONCE
Status: COMPLETED | OUTPATIENT
Start: 2023-11-25 | End: 2023-11-25

## 2023-11-25 RX ADMIN — ACETAMINOPHEN 975 MG: 325 TABLET, FILM COATED ORAL at 13:43
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
## 2023-11-25 NOTE — DISCHARGE INSTRUCTIONS
Follow-up with your primary care physician as well as orthopedics. He can take Tylenol every 6 hours as needed for pain. Keep the applied splint in place until following up with orthopedics. Please return to the emergency department if you develop worsening symptoms, severe pain, weakness, numbness, or anything else concerning to you.

## 2023-11-27 ENCOUNTER — TELEPHONE (OUTPATIENT)
Dept: OTHER | Facility: HOSPITAL | Age: 58
End: 2023-11-27

## 2023-11-27 ENCOUNTER — TELEPHONE (OUTPATIENT)
Dept: OBGYN CLINIC | Facility: CLINIC | Age: 58
End: 2023-11-27

## 2023-11-27 DIAGNOSIS — I12.9 BENIGN HYPERTENSION WITH CKD (CHRONIC KIDNEY DISEASE) STAGE IV (HCC): ICD-10-CM

## 2023-11-27 DIAGNOSIS — N18.4 STAGE 4 CHRONIC KIDNEY DISEASE (HCC): ICD-10-CM

## 2023-11-27 DIAGNOSIS — N18.4 BENIGN HYPERTENSION WITH CKD (CHRONIC KIDNEY DISEASE) STAGE IV (HCC): ICD-10-CM

## 2023-11-27 DIAGNOSIS — E87.6 HYPOKALEMIA: ICD-10-CM

## 2023-11-27 DIAGNOSIS — E87.6 HYPOKALEMIA: Primary | ICD-10-CM

## 2023-11-27 LAB
HCV RNA SERPL NAA+PROBE-ACNC: ABNORMAL IU/ML
HCV RNA SERPL NAA+PROBE-ACNC: DETECTED K[IU]/ML

## 2023-11-27 RX ORDER — POTASSIUM CHLORIDE 20 MEQ/1
40 TABLET, EXTENDED RELEASE ORAL 2 TIMES DAILY
Qty: 360 TABLET | Refills: 2 | Status: SHIPPED | OUTPATIENT
Start: 2023-11-27

## 2023-11-27 NOTE — TELEPHONE ENCOUNTER
Called pt to r/s their appt with [employer]  on 11/29. Pt stated they wanted to be seen soon because they are having a lot of pain in their wrist. I let pt know that we have other [facility] providers if they could travel. Pts appt has been r/s to 11/29 at 2:30 with Jeannie Hinton. Pt is aware of location and stated they can use GPS to get to [place].

## 2023-11-27 NOTE — TELEPHONE ENCOUNTER
Cr continue to improve to 2.2 closer to baseline. K level remains lower 3.1. As per med list she is on KCL 20 meq TID. Please have her increase this to 40 meq TID for one day and then continue 40 meq BID going forward afterwards. She should have repeat K level in 1 week.

## 2023-11-28 LAB
A2 MACROGLOB SERPL-MCNC: 275 MG/DL (ref 110–276)
ALT SERPL W P-5'-P-CCNC: 18 IU/L (ref 0–40)
APO A-I SERPL-MCNC: 127 MG/DL (ref 116–209)
BILIRUB SERPL-MCNC: 0.2 MG/DL (ref 0–1.2)
COMMENT: NORMAL
FIBROSIS SCORING:: NORMAL
FIBROSIS STAGE SERPL QL: NORMAL
GGT SERPL-CCNC: 23 IU/L (ref 0–60)
HAPTOGLOB SERPL-MCNC: 164 MG/DL (ref 33–346)
INTERPRETATIONS: NORMAL
LIVER FIBR SCORE SERPL CALC.FIBROSURE: 0.2 (ref 0–0.21)
NECROINFLAMM ACTIVITY SCORING:: NORMAL
NECROINFLAMMATORY ACT GRADE SERPL QL: NORMAL
NECROINFLAMMATORY ACT SCORE SERPL: 0.06 (ref 0–0.17)
REF LAB TEST METHOD: NORMAL
SERVICE CMNT-IMP: NORMAL

## 2023-11-29 ENCOUNTER — TELEPHONE (OUTPATIENT)
Age: 58
End: 2023-11-29

## 2023-11-29 ENCOUNTER — OFFICE VISIT (OUTPATIENT)
Dept: OBGYN CLINIC | Facility: CLINIC | Age: 58
End: 2023-11-29
Payer: COMMERCIAL

## 2023-11-29 VITALS
DIASTOLIC BLOOD PRESSURE: 86 MMHG | SYSTOLIC BLOOD PRESSURE: 136 MMHG | BODY MASS INDEX: 38.14 KG/M2 | WEIGHT: 243 LBS | HEIGHT: 67 IN

## 2023-11-29 DIAGNOSIS — S63.642A SPRAIN OF METACARPOPHALANGEAL (MCP) JOINT OF LEFT THUMB, INITIAL ENCOUNTER: ICD-10-CM

## 2023-11-29 DIAGNOSIS — M18.12 PRIMARY OSTEOARTHRITIS OF FIRST CARPOMETACARPAL JOINT OF LEFT HAND: Primary | ICD-10-CM

## 2023-11-29 DIAGNOSIS — K57.92 DIVERTICULITIS: Primary | ICD-10-CM

## 2023-11-29 PROCEDURE — 99214 OFFICE O/P EST MOD 30 MIN: CPT | Performed by: PHYSICIAN ASSISTANT
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
## 2023-11-29 NOTE — TELEPHONE ENCOUNTER
Please call and inform patient golytely sent to pharmacy. Patient will need prep instructions.  Thank you

## 2023-11-29 NOTE — TELEPHONE ENCOUNTER
Patients GI provider: Yvette Jaffe    Number to return call: [phone]    Reason for call: Pt calling in requesting a prescription can be sent over to her pharmacy on file, for procedure on Friday. Patient is not able to purchase the prep over the counter.       Scheduled procedure/appointment date if applicable: Apt/procedure 12/01/2023

## 2023-11-29 NOTE — TELEPHONE ENCOUNTER
I spoke to pt and informed [facility] prescription [facility] pharmacy [facility] If she has questions she can call back.  Sb

## 2023-11-29 NOTE — LETTER
November 29, 2023     Patient: Babatunde Kaminski  YOB: 1965  Date of Visit: 11/29/2023      To Whom it May Concern:    Romeo Almazan is under my professional care. Chelle Arnold was seen in my office on 11/29/2023. Chelle Arnold may return to work with the following restrictions:    Minimal use of the left upper extremity. Maximum lifting 1 pound occasionally. Restrictions in place until next evaluation. If you have any questions or concerns, please don't hesitate to call.          Sincerely,          Andrew Bailey PA-C

## 2023-11-30 DIAGNOSIS — B18.2 CHRONIC HEPATITIS C WITHOUT HEPATIC COMA (HCC): Primary | ICD-10-CM

## 2023-11-30 NOTE — TELEPHONE ENCOUNTER
----- Message from [address] sent at 11/30/2023  2:25 PM EST -----  Please let patient know labs show that she still has an active hepatitis C viral load so requires treatment. She does not have any evidence of fibrosis or cirrhosis which is good news. She does not have any immunity to hepatitis B or hepatitis A so I would recommend she get this vaccine through her PCP. She was negative for HIV.

## 2023-12-01 ENCOUNTER — HOSPITAL ENCOUNTER (OUTPATIENT)
Dept: GASTROENTEROLOGY | Facility: HOSPITAL | Age: 58
Setting detail: OUTPATIENT SURGERY
End: 2023-12-01
Payer: COMMERCIAL

## 2023-12-01 ENCOUNTER — ANESTHESIA (OUTPATIENT)
Dept: GASTROENTEROLOGY | Facility: HOSPITAL | Age: 58
End: 2023-12-01

## 2023-12-01 ENCOUNTER — ANESTHESIA EVENT (OUTPATIENT)
Dept: GASTROENTEROLOGY | Facility: HOSPITAL | Age: 58
End: 2023-12-01

## 2023-12-01 VITALS
HEART RATE: 70 BPM | TEMPERATURE: 97.6 F | OXYGEN SATURATION: 96 % | BODY MASS INDEX: 36.96 KG/M2 | HEIGHT: 67 IN | WEIGHT: 235.5 LBS | SYSTOLIC BLOOD PRESSURE: 115 MMHG | RESPIRATION RATE: 12 BRPM | DIASTOLIC BLOOD PRESSURE: 56 MMHG

## 2023-12-01 DIAGNOSIS — R11.2 NAUSEA AND VOMITING, UNSPECIFIED VOMITING TYPE: ICD-10-CM

## 2023-12-01 DIAGNOSIS — K57.32 DIVERTICULITIS OF LARGE INTESTINE WITHOUT PERFORATION OR ABSCESS WITHOUT BLEEDING: ICD-10-CM

## 2023-12-01 DIAGNOSIS — R19.7 DIARRHEA, UNSPECIFIED TYPE: ICD-10-CM

## 2023-12-01 DIAGNOSIS — R10.32 LLQ PAIN: ICD-10-CM

## 2023-12-01 DIAGNOSIS — K21.9 GASTROESOPHAGEAL REFLUX DISEASE WITHOUT ESOPHAGITIS: Chronic | ICD-10-CM

## 2023-12-01 DIAGNOSIS — K21.9 GASTROESOPHAGEAL REFLUX DISEASE WITHOUT ESOPHAGITIS: ICD-10-CM

## 2023-12-01 PROBLEM — M25.561 CHRONIC PAIN OF BOTH KNEES: Status: ACTIVE | Noted: 2023-03-08

## 2023-12-01 PROBLEM — Z87.19 HISTORY OF COLONIC DIVERTICULITIS: Status: ACTIVE | Noted: 2023-08-25

## 2023-12-01 PROBLEM — D63.1 ANEMIA DUE TO CHRONIC KIDNEY DISEASE: Status: ACTIVE | Noted: 2023-08-25

## 2023-12-01 PROBLEM — G89.29 CHRONIC PAIN OF BOTH KNEES: Status: ACTIVE | Noted: 2023-03-08

## 2023-12-01 PROBLEM — B35.9 TINEA: Status: ACTIVE | Noted: 2023-08-25

## 2023-12-01 PROBLEM — Z95.810 IMPLANTABLE CARDIOVERTER-DEFIBRILLATOR (ICD) IN SITU: Status: ACTIVE | Noted: 2020-06-02

## 2023-12-01 PROBLEM — M25.562 CHRONIC PAIN OF BOTH KNEES: Status: ACTIVE | Noted: 2023-03-08

## 2023-12-01 PROBLEM — E78.2 MIXED HYPERLIPIDEMIA: Status: ACTIVE | Noted: 2020-06-02

## 2023-12-01 PROBLEM — F14.90 COCAINE USE: Status: ACTIVE | Noted: 2020-06-02

## 2023-12-01 PROBLEM — M20.12 ACQUIRED HALLUX VALGUS OF LEFT FOOT: Status: ACTIVE | Noted: 2022-09-08

## 2023-12-01 PROBLEM — Z59.00 HOMELESS: Status: ACTIVE | Noted: 2023-03-08

## 2023-12-01 PROBLEM — M54.16 LUMBAR RADICULOPATHY: Status: ACTIVE | Noted: 2021-08-26

## 2023-12-01 PROBLEM — N18.9 ANEMIA DUE TO CHRONIC KIDNEY DISEASE: Status: ACTIVE | Noted: 2023-08-25

## 2023-12-01 LAB
HCV GENTYP SERPL NAA+PROBE: NORMAL
HCV PLEASE NOTE: NORMAL

## 2023-12-01 PROCEDURE — 43239 EGD BIOPSY SINGLE/MULTIPLE: CPT | Performed by: INTERNAL MEDICINE

## 2023-12-01 PROCEDURE — 88305 TISSUE EXAM BY PATHOLOGIST: CPT | Performed by: PATHOLOGY

## 2023-12-01 PROCEDURE — 45330 DIAGNOSTIC SIGMOIDOSCOPY: CPT | Performed by: INTERNAL MEDICINE

## 2023-12-01 RX ORDER — PROPOFOL 10 MG/ML
INJECTION, EMULSION INTRAVENOUS AS NEEDED
Status: DISCONTINUED | OUTPATIENT
Start: 2023-12-01 | End: 2023-12-01

## 2023-12-01 RX ORDER — EPHEDRINE SULFATE 50 MG/ML
INJECTION INTRAVENOUS AS NEEDED
Status: DISCONTINUED | OUTPATIENT
Start: 2023-12-01 | End: 2023-12-01

## 2023-12-01 RX ORDER — LIDOCAINE HYDROCHLORIDE 20 MG/ML
INJECTION, SOLUTION EPIDURAL; INFILTRATION; INTRACAUDAL; PERINEURAL AS NEEDED
Status: DISCONTINUED | OUTPATIENT
Start: 2023-12-01 | End: 2023-12-01

## 2023-12-01 RX ORDER — GLECAPREVIR AND PIBRENTASVIR 40; 100 MG/1; MG/1
3 TABLET, FILM COATED ORAL DAILY
Qty: 84 TABLET | Refills: 1 | Status: SHIPPED | OUTPATIENT
Start: 2023-12-01 | End: 2023-12-04 | Stop reason: SDUPTHER

## 2023-12-01 RX ORDER — SODIUM CHLORIDE, SODIUM LACTATE, POTASSIUM CHLORIDE, CALCIUM CHLORIDE 600; 310; 30; 20 MG/100ML; MG/100ML; MG/100ML; MG/100ML
INJECTION, SOLUTION INTRAVENOUS CONTINUOUS PRN
Status: DISCONTINUED | OUTPATIENT
Start: 2023-12-01 | End: 2023-12-01

## 2023-12-01 RX ADMIN — EPHEDRINE SULFATE 10 MG: 50 INJECTION, SOLUTION INTRAVENOUS at 11:45

## 2023-12-01 RX ADMIN — PROPOFOL 50 MG: 10 INJECTION, EMULSION INTRAVENOUS at 11:34

## 2023-12-01 RX ADMIN — SODIUM CHLORIDE, SODIUM LACTATE, POTASSIUM CHLORIDE, AND CALCIUM CHLORIDE: .6; .31; .03; .02 INJECTION, SOLUTION INTRAVENOUS at 11:30

## 2023-12-01 RX ADMIN — LIDOCAINE HYDROCHLORIDE 100 MG: 20 INJECTION, SOLUTION EPIDURAL; INFILTRATION; INTRACAUDAL; PERINEURAL at 11:32

## 2023-12-01 RX ADMIN — EPHEDRINE SULFATE 10 MG: 50 INJECTION, SOLUTION INTRAVENOUS at 11:42

## 2023-12-01 RX ADMIN — PROPOFOL 100 MG: 10 INJECTION, EMULSION INTRAVENOUS at 11:32

## 2023-12-01 RX ADMIN — PROPOFOL 50 MG: 10 INJECTION, EMULSION INTRAVENOUS at 11:37
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
## 2023-12-01 NOTE — TELEPHONE ENCOUNTER
Spoke with pt, she is unable to speak at the moment due to being prepped for colonoscopy. Advised of [employer] message sent and that I would call back on Monday to review education with her.  Pt agreeable and verbalized understanding of the same

## 2023-12-01 NOTE — ANESTHESIA PREPROCEDURE EVALUATION
Procedure:  COLONOSCOPY  EGD    Relevant Problems   CARDIO   (+) Acute on chronic diastolic congestive heart failure (HCC)   (+) Acute on chronic heart failure with preserved ejection fraction (HCC)   (+) Atypical atrial flutter (HCC)   (+) Chest pain   (+) HTN (hypertension)   (+) Mixed hyperlipidemia   (+) Paroxysmal A-fib (HCC)      GI/HEPATIC   (+) Gastroesophageal reflux disease    (+) Hepatitis C      /RENAL   (+) Benign hypertension with CKD (chronic kidney disease) stage IV (HCC)   (+) Nephrolithiasis   (+) Renal cyst   (+) Stage 4 chronic kidney disease (HCC)      HEMATOLOGY   (+) Anemia due to chronic kidney disease   (+) Anemia in stage 4 chronic kidney disease    (+) Thrombocytopenia (HCC)      MUSCULOSKELETAL   (+) Left low back pain   (+) Osteoarthritis   (+) Primary osteoarthritis of first carpometacarpal joint of left hand   (+) Sciatica      NEURO/PSYCH   (+) Headache      PULMONARY   (+) Obstructive sleep apnea, adult      Cardiovascular and Mediastinum   (+) History of tachycardia induced cardiomyopathy   (+) LVH (left ventricular hypertrophy)   (+) Nonischemic nontraumatic myocardial injury due to CHF      Digestive   (+) Diverticulitis of large intestine without perforation or abscess      Nervous and Auditory   (+) Lumbar radiculopathy      Musculoskeletal and Integument   (+) Acquired hallux valgus of left foot   (+) Sprain of metacarpophalangeal (MCP) joint of left thumb, initial encounter      Other   (+) Acute right flank pain   (+) Chronic pain of both knees   (+) Cocaine abuse (HCC)   (+) Cocaine use   (+) Facial numbness, resolved   (+) History of colonic diverticulitis   (+) History of monomorphic ventricular tachycardia, s/p ICD in 2016   (+) Hypokalemia   (+) Implantable cardioverter-defibrillator (ICD) in situ   (+) Leukopenia   (+) Morbid obesity   (+) Tobacco abuse      Lab Results   Component Value Date    SODIUM 138 11/24/2023    K 3.1 (L) 11/24/2023    CL 96 11/24/2023    CO2 34 (H) 11/24/2023    AGAP 8 11/24/2023    BUN 30 (H) 11/24/2023    CREATININE 2.22 (H) 11/24/2023    GLUC 105 11/24/2023    GLUF 91 10/20/2023    CALCIUM 9.7 11/24/2023    AST 19 11/24/2023    ALT 16 11/24/2023    ALT 18 11/24/2023    ALKPHOS 73 11/24/2023    TP 7.7 11/24/2023    TBILI 0.50 11/24/2023    EGFR 23 11/24/2023     Lab Results   Component Value Date    WBC 7.13 11/24/2023    HGB 13.9 11/24/2023    HCT 43.8 11/24/2023    MCV 86 11/24/2023     11/24/2023       10/6/26 NM SPECT    No major reversible ischemia/perfusion defect noted. There is significant underlying artifact due to increased extracardiac tracer uptake. Stress ECG: The ECG was not diagnostic due to resting ST-T abnormalities due to AV pacing. Perfusion: Suboptimal imaging due to increased extracardiac tracer uptake. No major reversible ischemia/perfusion defect noted. Stress Function: Left ventricular function post-stress is normal.  Calculated ejection fraction is 55%. Perfusion Defect Conclusion: There is no evidence of transient ischemic dilation (TID). 05/12/23 TTE  Left Ventricle Left ventricular cavity size is normal. Wall thickness is moderately increased. There is moderate asymmetric hypertrophy of the septal wall. The left ventricular ejection fraction is 65%. Systolic function is normal.  Wall motion is normal. Diastolic function is normal.   Right Ventricle Right ventricular cavity size is normal. Systolic function is normal. Wall thickness is normal. A pacer wire is present. Left Atrium The atrium is normal in size. Right Atrium The atrium is normal in size. Aortic Valve The aortic valve is trileaflet. The leaflets are not thickened. The leaflets are not calcified. The leaflets exhibit normal mobility. There is no evidence of regurgitation. The aortic valve has no significant stenosis. Mitral Valve Mitral valve structure is normal. There is trace regurgitation. There is no evidence of stenosis. Tricuspid Valve Tricuspid valve structure is normal. There is no evidence of regurgitation. There is no evidence of stenosis. Pulmonic Valve Pulmonic valve structure is normal. There is no evidence of regurgitation. There is no evidence of stenosis. Ascending Aorta The aortic root is normal in size. IVC/SVC The inferior vena cava is normal in size. Pericardium There is a trivial pericardial effusion anterior to the heart. There is no echocardiographic evidence of tamponade. The pericardium is normal in appearance. Physical Exam    Airway    Mallampati score: II  TM Distance: >3 FB  Neck ROM: full     Dental   Comment: edentulous     Cardiovascular      Pulmonary      Other Findings  post-pubertal.      Anesthesia Plan  ASA Score- 3     Anesthesia Type- IV sedation with anesthesia with ASA Monitors. Additional Monitors:     Airway Plan:            Plan Factors-Exercise tolerance (METS): >4 METS. Chart reviewed. EKG reviewed. Imaging results reviewed. Existing labs reviewed. Patient summary reviewed. Induction- intravenous. Postoperative Plan-     Informed Consent- Anesthetic plan and risks discussed with patient. I personally reviewed this patient with the CRNA. Discussed and agreed on the Anesthesia Plan with the CRNA. My [place]

## 2023-12-01 NOTE — ANESTHESIA POSTPROCEDURE EVALUATION
Post-Op Assessment Note    CV Status:  Stable    Pain management: adequate       Mental Status:  Sleepy and awake   Hydration Status:  Euvolemic   PONV Controlled:  Controlled   Airway Patency:  Patent     Post Op Vitals Reviewed: Yes    No anethesia notable event occurred.     Staff: CRNA               /56 (12/01/23 1153)    Temp     Pulse 66 (12/01/23 1153)   Resp 18 (12/01/23 1153)    SpO2 96 % (12/01/23 1153)

## 2023-12-01 NOTE — TELEPHONE ENCOUNTER
MAVYRET 8 WK SCRIPT ENTERED FOR PA Treatment [facility]  Viral Load [id number]  Genotype 1a  Fibrosure F0    HBV surface Ab <3 (VACCINE RECOMMENDED)

## 2023-12-04 ENCOUNTER — TELEPHONE (OUTPATIENT)
Dept: CARDIOLOGY CLINIC | Facility: CLINIC | Age: 58
End: 2023-12-04

## 2023-12-04 RX ORDER — GLECAPREVIR AND PIBRENTASVIR 40; 100 MG/1; MG/1
3 TABLET, FILM COATED ORAL DAILY
Qty: 84 TABLET | Refills: 1 | Status: SHIPPED | OUTPATIENT
Start: 2023-12-04 | End: 2024-01-29

## 2023-12-04 NOTE — TELEPHONE ENCOUNTER
Insurance requires  [facility]… pt is active with [employer], no PA required - all patient info faxing to [facility]. Spoke with pt. Education initiated. All questions and concerns addressed.  Pt agrees to call when she hears from the pharmacy

## 2023-12-06 PROCEDURE — 88305 TISSUE EXAM BY PATHOLOGIST: CPT | Performed by: PATHOLOGY

## 2023-12-06 NOTE — TELEPHONE ENCOUNTER
Spoke with [facility], medication is ready to dispense to pt @ $0 copay. They had 1 unsuccessful attempt to reach patient. I called and spoke with pt. Advised of pharmacy information and her need to call them.  Pt agrees to call back to confirm receipt of medication and start date

## 2023-12-21 DIAGNOSIS — R11.2 NAUSEA AND VOMITING, UNSPECIFIED VOMITING TYPE: Primary | ICD-10-CM

## 2023-12-24 RX ORDER — PANTOPRAZOLE SODIUM 40 MG/1
40 TABLET, DELAYED RELEASE ORAL DAILY
Qty: 30 TABLET | Refills: 2 | Status: SHIPPED | OUTPATIENT
Start: 2023-12-24

## 2023-12-24 NOTE — PROGRESS NOTES
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 due to poor preparation. The patient tolerated the procedure well. There were no apparent adverse events. ANESTHESIA INFORMATION: ASA: III Anesthesia Type: IV Sedation with Anesthesia MEDICATIONS: No administrations occurring from 1128 to 1146 on 12/01/23 FINDINGS: Inadequate prep.  Procedure aborted EVENTS: Procedure Events Event Event Time ENDO CECUM REACHED 12/1/2023 11:45 AM ENDO SCOPE OUT TIME 12/1/2023 11:45 AM SPECIMENS: ID Type Source Tests Collected by Time Destination 1 : Cold Biopsy Antrum/ R/O H pylori Tissue Stomach TISSUE EXAM Sherman Workman MD 12/1/2023 11:35 AM  2 : Cold Biopsy Distal Esophagus @39cm/ R/O Barretts Tissue Esophagus TISSUE EXAM Sherman Workman MD 12/1/2023 11:36 AM  EQUIPMENT: Colonoscope -PCI-H180AL     Impression: No impression generated RECOMMENDATION:  Repeat colonoscopy in 3 months  Inadequate bowel preparation    Sherman Workman MD     XR wrist 3+ views LEFT    Result Date: 11/26/2023  Narrative: LEFT WRIST INDICATION:   injury. COMPARISON: Left hand x-rays 1/11/2021 VIEWS:  XR WRIST 3+ VW LEFT FINDINGS: There is no  "acute fracture or dislocation. Severe degenerative changes of the 1st carpal metacarpal (CMC) articulation. No lytic or blastic osseous lesion. Soft tissues are unremarkable.     Impression: No acute osseous abnormality. Workstation performed: [id number]       Review of Systems:  Review of Systems   All other systems reviewed and are negative.      Physical Exam:  /70 (BP Location: Right arm, Patient Position: Sitting, Cuff Size: Large)   Pulse 63   Ht 5' 7.5\" (1.715 m)   Wt 108 kg (237 lb 3.2 oz)   SpO2 98%   BMI 36.60 kg/m²   Physical Exam  Vitals reviewed.   Constitutional:       Appearance: She is well-developed.   HENT:      Head: Normocephalic and atraumatic.   Eyes:      Conjunctiva/sclera: Conjunctivae normal.      Pupils: Pupils are equal, round, and reactive to light.   Cardiovascular:      Rate and Rhythm: Normal rate.      Heart sounds: Normal heart sounds.   Pulmonary:      Effort: Pulmonary effort is normal.      Breath sounds: Normal breath sounds.   Musculoskeletal:         General: Swelling present.      Cervical back: Normal range of motion and neck supple.      Comments: Dressings in place   Skin:     General: Skin is warm and dry.   Neurological:      Mental Status: She is alert and oriented to person, place, and time.         Discussion/Summary:I will continue the patient's present medical regimen.  The patient appears well compensated.  I have asked the patient to call if there is a problem in the interim otherwise I will see the patient in six months time.  "

## 2023-12-27 ENCOUNTER — TELEPHONE (OUTPATIENT)
Dept: GASTROENTEROLOGY | Facility: CLINIC | Age: 58
End: 2023-12-27

## 2023-12-27 NOTE — TELEPHONE ENCOUNTER
Please see procedure done 12/1/23. Please call to schedule recall colon 3 months-poor prep/use extended prep with Dr Workman. Thank you

## 2024-01-03 ENCOUNTER — OFFICE VISIT (OUTPATIENT)
Dept: CARDIOLOGY CLINIC | Facility: CLINIC | Age: 59
End: 2024-01-03
Payer: COMMERCIAL

## 2024-01-03 VITALS
SYSTOLIC BLOOD PRESSURE: 116 MMHG | DIASTOLIC BLOOD PRESSURE: 70 MMHG | OXYGEN SATURATION: 98 % | HEIGHT: 68 IN | WEIGHT: 237.2 LBS | HEART RATE: 63 BPM | BODY MASS INDEX: 35.95 KG/M2

## 2024-01-03 DIAGNOSIS — Z86.79 HISTORY OF VENTRICULAR TACHYCARDIA: ICD-10-CM

## 2024-01-03 DIAGNOSIS — R07.9 CHEST PAIN, UNSPECIFIED TYPE: ICD-10-CM

## 2024-01-03 DIAGNOSIS — Z72.0 TOBACCO ABUSE: ICD-10-CM

## 2024-01-03 DIAGNOSIS — I50.33 ACUTE ON CHRONIC DIASTOLIC CHF (CONGESTIVE HEART FAILURE) (HCC): ICD-10-CM

## 2024-01-03 DIAGNOSIS — I10 PRIMARY HYPERTENSION: Primary | ICD-10-CM

## 2024-01-03 DIAGNOSIS — N18.32 STAGE 3B CHRONIC KIDNEY DISEASE (HCC): ICD-10-CM

## 2024-01-03 DIAGNOSIS — I48.0 PAROXYSMAL A-FIB (HCC): ICD-10-CM

## 2024-01-03 PROCEDURE — 99214 OFFICE O/P EST MOD 30 MIN: CPT | Performed by: INTERNAL MEDICINE

## 2024-01-03 PROCEDURE — 93000 ELECTROCARDIOGRAM COMPLETE: CPT | Performed by: INTERNAL MEDICINE

## 2024-01-03 RX ORDER — OXYCODONE HCL 10 MG/1
5 TABLET, FILM COATED, EXTENDED RELEASE ORAL EVERY 12 HOURS SCHEDULED
COMMUNITY

## 2024-01-03 NOTE — PATIENT INSTRUCTIONS
I will continue the patient's present medical regimen.  The patient appears well compensated.  I have asked the patient to call if there is a problem in the interim otherwise I will see the patient in six months time.

## 2024-01-11 ENCOUNTER — TELEPHONE (OUTPATIENT)
Dept: OTHER | Facility: HOSPITAL | Age: 59
End: 2024-01-11

## 2024-01-11 ENCOUNTER — APPOINTMENT (OUTPATIENT)
Dept: LAB | Facility: CLINIC | Age: 59
End: 2024-01-11
Payer: COMMERCIAL

## 2024-01-11 ENCOUNTER — PATIENT OUTREACH (OUTPATIENT)
Dept: CASE MANAGEMENT | Facility: HOSPITAL | Age: 59
End: 2024-01-11

## 2024-01-11 DIAGNOSIS — E87.6 HYPOKALEMIA: ICD-10-CM

## 2024-01-11 DIAGNOSIS — N18.4 STAGE 4 CHRONIC KIDNEY DISEASE (HCC): ICD-10-CM

## 2024-01-11 DIAGNOSIS — D63.1 ANEMIA IN STAGE 4 CHRONIC KIDNEY DISEASE: ICD-10-CM

## 2024-01-11 DIAGNOSIS — N18.4 BENIGN HYPERTENSION WITH CKD (CHRONIC KIDNEY DISEASE) STAGE IV (HCC): ICD-10-CM

## 2024-01-11 DIAGNOSIS — B18.2 CHRONIC HEPATITIS C WITHOUT HEPATIC COMA (HCC): ICD-10-CM

## 2024-01-11 DIAGNOSIS — N18.4 ANEMIA IN STAGE 4 CHRONIC KIDNEY DISEASE: ICD-10-CM

## 2024-01-11 DIAGNOSIS — I12.9 BENIGN HYPERTENSION WITH CKD (CHRONIC KIDNEY DISEASE) STAGE IV (HCC): ICD-10-CM

## 2024-01-11 LAB
ALBUMIN SERPL BCP-MCNC: 4.1 G/DL (ref 3.5–5)
ALP SERPL-CCNC: 90 U/L (ref 34–104)
ALT SERPL W P-5'-P-CCNC: 3 U/L (ref 7–52)
ANION GAP SERPL CALCULATED.3IONS-SCNC: 6 MMOL/L
AST SERPL W P-5'-P-CCNC: 12 U/L (ref 13–39)
BASOPHILS # BLD AUTO: 0.02 THOUSANDS/ÂΜL (ref 0–0.1)
BASOPHILS NFR BLD AUTO: 1 % (ref 0–1)
BILIRUB SERPL-MCNC: 0.65 MG/DL (ref 0.2–1)
BUN SERPL-MCNC: 23 MG/DL (ref 5–25)
CALCIUM SERPL-MCNC: 9.1 MG/DL (ref 8.4–10.2)
CHLORIDE SERPL-SCNC: 103 MMOL/L (ref 96–108)
CO2 SERPL-SCNC: 30 MMOL/L (ref 21–32)
CREAT SERPL-MCNC: 1.82 MG/DL (ref 0.6–1.3)
CREAT UR-MCNC: 43.8 MG/DL
EOSINOPHIL # BLD AUTO: 0.08 THOUSAND/ÂΜL (ref 0–0.61)
EOSINOPHIL NFR BLD AUTO: 2 % (ref 0–6)
ERYTHROCYTE [DISTWIDTH] IN BLOOD BY AUTOMATED COUNT: 14.6 % (ref 11.6–15.1)
GFR SERPL CREATININE-BSD FRML MDRD: 30 ML/MIN/1.73SQ M
GLUCOSE SERPL-MCNC: 87 MG/DL (ref 65–140)
HCT VFR BLD AUTO: 36.5 % (ref 34.8–46.1)
HGB BLD-MCNC: 11 G/DL (ref 11.5–15.4)
IMM GRANULOCYTES # BLD AUTO: 0.01 THOUSAND/UL (ref 0–0.2)
IMM GRANULOCYTES NFR BLD AUTO: 0 % (ref 0–2)
LYMPHOCYTES # BLD AUTO: 1.36 THOUSANDS/ÂΜL (ref 0.6–4.47)
LYMPHOCYTES NFR BLD AUTO: 31 % (ref 14–44)
MCH RBC QN AUTO: 26.4 PG (ref 26.8–34.3)
MCHC RBC AUTO-ENTMCNC: 30.1 G/DL (ref 31.4–37.4)
MCV RBC AUTO: 88 FL (ref 82–98)
MICROALBUMIN UR-MCNC: <7 MG/L
MICROALBUMIN/CREAT 24H UR: <16 MG/G CREATININE (ref 0–30)
MONOCYTES # BLD AUTO: 0.31 THOUSAND/ÂΜL (ref 0.17–1.22)
MONOCYTES NFR BLD AUTO: 7 % (ref 4–12)
NEUTROPHILS # BLD AUTO: 2.55 THOUSANDS/ÂΜL (ref 1.85–7.62)
NEUTS SEG NFR BLD AUTO: 59 % (ref 43–75)
NRBC BLD AUTO-RTO: 0 /100 WBCS
PHOSPHATE SERPL-MCNC: 3.8 MG/DL (ref 2.7–4.5)
PLATELET # BLD AUTO: 136 THOUSANDS/UL (ref 149–390)
PMV BLD AUTO: 12.4 FL (ref 8.9–12.7)
POTASSIUM SERPL-SCNC: 3.5 MMOL/L (ref 3.5–5.3)
PROT SERPL-MCNC: 7.5 G/DL (ref 6.4–8.4)
PTH-INTACT SERPL-MCNC: 90.1 PG/ML (ref 12–88)
RBC # BLD AUTO: 4.16 MILLION/UL (ref 3.81–5.12)
SODIUM SERPL-SCNC: 139 MMOL/L (ref 135–147)
WBC # BLD AUTO: 4.33 THOUSAND/UL (ref 4.31–10.16)

## 2024-01-11 PROCEDURE — 82043 UR ALBUMIN QUANTITATIVE: CPT

## 2024-01-11 PROCEDURE — 36415 COLL VENOUS BLD VENIPUNCTURE: CPT

## 2024-01-11 PROCEDURE — 80053 COMPREHEN METABOLIC PANEL: CPT

## 2024-01-11 PROCEDURE — 87522 HEPATITIS C REVRS TRNSCRPJ: CPT

## 2024-01-11 PROCEDURE — 85025 COMPLETE CBC W/AUTO DIFF WBC: CPT

## 2024-01-11 PROCEDURE — 82570 ASSAY OF URINE CREATININE: CPT

## 2024-01-11 PROCEDURE — 83970 ASSAY OF PARATHORMONE: CPT

## 2024-01-11 PROCEDURE — 84100 ASSAY OF PHOSPHORUS: CPT

## 2024-01-11 PROCEDURE — 87521 HEPATITIS C PROBE&RVRS TRNSC: CPT

## 2024-01-11 NOTE — PROGRESS NOTES
Heart Failure Outpatient Care Coordinator Note: Chart notes reviewed. Patient had right total knee replacement at [facility]  last month. She saw cardiology 1/3/24 and weight was stable from previous dry weight 237#. Patient was admitted at [facility] for right leg cellulitis and discharged to home. Call made to patient, her  answered jas states he was getting blood work done. Will call back.  Call made to patient and she reports, doing well. States right leg is better, redness is gone, still mild swelling. States her weights are good and was 222# on her home scale, which is down 15# from 1/3 at cardiolgy office visit. Denies any SOB or worsening edema or abdominal bloating. Taking all her medication. Still waiting to hear about disability. States she and her  are moving with her daughter, granddaughter and great great children to a 4 bedroom house in [place] next month. She denies any concerns or needs. She has my contact # and I will follow up with her next month.

## 2024-01-12 ENCOUNTER — TELEPHONE (OUTPATIENT)
Dept: CARDIOLOGY CLINIC | Facility: CLINIC | Age: 59
End: 2024-01-12

## 2024-01-12 LAB — HCV RNA SERPL NAA+PROBE-ACNC: NOT DETECTED K[IU]/ML

## 2024-01-12 NOTE — TELEPHONE ENCOUNTER
Patient called, states she has been feeling intermittent palpitations, like her heart is pounding. She asked if anything is showing on her device. Occurring for the last couple of days. Not symptomatic other than noticing the palpitations.  She has been away from home and unable to send a remote transmission.

## 2024-01-12 NOTE — TELEPHONE ENCOUNTER
S/w patient, stated that she is not home and will not be home until tomorrow. Informed that monitor will not send unless near monitor and once she is back home should send a transmission. Verbally understood.

## 2024-01-25 ENCOUNTER — TELEPHONE (OUTPATIENT)
Dept: GASTROENTEROLOGY | Facility: CLINIC | Age: 59
End: 2024-01-25

## 2024-01-25 NOTE — TELEPHONE ENCOUNTER
Lmm for pt to call back and schedule a colon with Dr Workman due to poor prep. Pt needs to be scheduled at  due to insurance  with a 2 day golytely prep. Order is in active requests. Thanks Elle

## 2024-02-01 ENCOUNTER — TELEPHONE (OUTPATIENT)
Dept: GASTROENTEROLOGY | Facility: CLINIC | Age: 59
End: 2024-02-01

## 2024-02-01 DIAGNOSIS — K57.92 DIVERTICULITIS: ICD-10-CM

## 2024-02-01 NOTE — TELEPHONE ENCOUNTER
Scheduled date of colonoscopy (as of today):3/26/24  Physician performing colonoscopy:Dr Workman   Location of colonoscopy:   Bowel prep reviewed with patient:2 day golytely prep  Instructions reviewed with patient by:sb  Clearances: Dr Blackburn-faxed sb    [FreeTextEntry1] : Influenza immunization

## 2024-02-01 NOTE — TELEPHONE ENCOUNTER
Pt scheduled with Dr Workman at  on 3/26/24 . Instructions will be sent thru my chart for pt. Cardiac clearance faxed. Sb

## 2024-02-03 ENCOUNTER — APPOINTMENT (EMERGENCY)
Dept: RADIOLOGY | Facility: HOSPITAL | Age: 59
DRG: 194 | End: 2024-02-03
Payer: COMMERCIAL

## 2024-02-03 ENCOUNTER — HOSPITAL ENCOUNTER (INPATIENT)
Facility: HOSPITAL | Age: 59
LOS: 2 days | Discharge: HOME | DRG: 194 | End: 2024-02-05
Attending: EMERGENCY MEDICINE | Admitting: INTERNAL MEDICINE
Payer: COMMERCIAL

## 2024-02-03 DIAGNOSIS — I50.9 CHF (CONGESTIVE HEART FAILURE) (HCC): Primary | ICD-10-CM

## 2024-02-03 DIAGNOSIS — E87.6 HYPOKALEMIA: ICD-10-CM

## 2024-02-03 PROBLEM — R79.89 ELEVATED TROPONIN: Status: ACTIVE | Noted: 2024-02-03

## 2024-02-03 LAB
2HR DELTA HS TROPONIN: 14 NG/L
4HR DELTA HS TROPONIN: 18 NG/L
ALBUMIN SERPL BCP-MCNC: 3.8 G/DL (ref 3.5–5)
ALP SERPL-CCNC: 86 U/L (ref 34–104)
ALT SERPL W P-5'-P-CCNC: 7 U/L (ref 7–52)
AMPHETAMINES SERPL QL SCN: NEGATIVE
ANION GAP SERPL CALCULATED.3IONS-SCNC: 7 MMOL/L
AST SERPL W P-5'-P-CCNC: 15 U/L (ref 13–39)
BARBITURATES UR QL: NEGATIVE
BASOPHILS # BLD AUTO: 0.03 THOUSANDS/ÂΜL (ref 0–0.1)
BASOPHILS NFR BLD AUTO: 0 % (ref 0–1)
BENZODIAZ UR QL: NEGATIVE
BILIRUB SERPL-MCNC: 0.63 MG/DL (ref 0.2–1)
BNP SERPL-MCNC: 614 PG/ML (ref 0–100)
BUN SERPL-MCNC: 12 MG/DL (ref 5–25)
CALCIUM SERPL-MCNC: 9 MG/DL (ref 8.4–10.2)
CARDIAC TROPONIN I PNL SERPL HS: 38 NG/L
CARDIAC TROPONIN I PNL SERPL HS: 52 NG/L
CARDIAC TROPONIN I PNL SERPL HS: 56 NG/L
CHLORIDE SERPL-SCNC: 104 MMOL/L (ref 96–108)
CO2 SERPL-SCNC: 27 MMOL/L (ref 21–32)
COCAINE UR QL: NEGATIVE
CREAT SERPL-MCNC: 1.45 MG/DL (ref 0.6–1.3)
EOSINOPHIL # BLD AUTO: 0.08 THOUSAND/ÂΜL (ref 0–0.61)
EOSINOPHIL NFR BLD AUTO: 1 % (ref 0–6)
ERYTHROCYTE [DISTWIDTH] IN BLOOD BY AUTOMATED COUNT: 14.5 % (ref 11.6–15.1)
GFR SERPL CREATININE-BSD FRML MDRD: 39 ML/MIN/1.73SQ M
GLUCOSE SERPL-MCNC: 104 MG/DL (ref 65–140)
HCT VFR BLD AUTO: 38.7 % (ref 34.8–46.1)
HGB BLD-MCNC: 12.3 G/DL (ref 11.5–15.4)
IMM GRANULOCYTES # BLD AUTO: 0.05 THOUSAND/UL (ref 0–0.2)
IMM GRANULOCYTES NFR BLD AUTO: 1 % (ref 0–2)
LYMPHOCYTES # BLD AUTO: 1.54 THOUSANDS/ÂΜL (ref 0.6–4.47)
LYMPHOCYTES NFR BLD AUTO: 21 % (ref 14–44)
MCH RBC QN AUTO: 26.7 PG (ref 26.8–34.3)
MCHC RBC AUTO-ENTMCNC: 31.8 G/DL (ref 31.4–37.4)
MCV RBC AUTO: 84 FL (ref 82–98)
METHADONE UR QL: NEGATIVE
MONOCYTES # BLD AUTO: 0.37 THOUSAND/ÂΜL (ref 0.17–1.22)
MONOCYTES NFR BLD AUTO: 5 % (ref 4–12)
NEUTROPHILS # BLD AUTO: 5.29 THOUSANDS/ÂΜL (ref 1.85–7.62)
NEUTS SEG NFR BLD AUTO: 72 % (ref 43–75)
NRBC BLD AUTO-RTO: 0 /100 WBCS
OPIATES UR QL SCN: POSITIVE
OXYCODONE+OXYMORPHONE UR QL SCN: POSITIVE
PCP UR QL: NEGATIVE
PLATELET # BLD AUTO: 181 THOUSANDS/UL (ref 149–390)
PMV BLD AUTO: 11.2 FL (ref 8.9–12.7)
POTASSIUM SERPL-SCNC: 3.3 MMOL/L (ref 3.5–5.3)
PROT SERPL-MCNC: 7.4 G/DL (ref 6.4–8.4)
RBC # BLD AUTO: 4.6 MILLION/UL (ref 3.81–5.12)
SODIUM SERPL-SCNC: 138 MMOL/L (ref 135–147)
THC UR QL: NEGATIVE
WBC # BLD AUTO: 7.36 THOUSAND/UL (ref 4.31–10.16)

## 2024-02-03 PROCEDURE — 36415 COLL VENOUS BLD VENIPUNCTURE: CPT

## 2024-02-03 PROCEDURE — 83880 ASSAY OF NATRIURETIC PEPTIDE: CPT

## 2024-02-03 PROCEDURE — 71046 X-RAY EXAM CHEST 2 VIEWS: CPT

## 2024-02-03 PROCEDURE — 84484 ASSAY OF TROPONIN QUANT: CPT

## 2024-02-03 PROCEDURE — 99285 EMERGENCY DEPT VISIT HI MDM: CPT

## 2024-02-03 PROCEDURE — 85025 COMPLETE CBC W/AUTO DIFF WBC: CPT

## 2024-02-03 PROCEDURE — 99223 1ST HOSP IP/OBS HIGH 75: CPT | Performed by: INTERNAL MEDICINE

## 2024-02-03 PROCEDURE — 80053 COMPREHEN METABOLIC PANEL: CPT

## 2024-02-03 PROCEDURE — 93005 ELECTROCARDIOGRAM TRACING: CPT

## 2024-02-03 PROCEDURE — 80307 DRUG TEST PRSMV CHEM ANLYZR: CPT | Performed by: INTERNAL MEDICINE

## 2024-02-03 RX ORDER — DOXAZOSIN MESYLATE 1 MG/1
2 TABLET ORAL
Status: DISCONTINUED | OUTPATIENT
Start: 2024-02-03 | End: 2024-02-05 | Stop reason: HOSPADM

## 2024-02-03 RX ORDER — HYDROMORPHONE HCL/PF 1 MG/ML
0.5 SYRINGE (ML) INJECTION ONCE
Status: COMPLETED | OUTPATIENT
Start: 2024-02-03 | End: 2024-02-03

## 2024-02-03 RX ORDER — OXYCODONE HYDROCHLORIDE 5 MG/1
5 TABLET ORAL EVERY 12 HOURS PRN
Status: DISCONTINUED | OUTPATIENT
Start: 2024-02-03 | End: 2024-02-03

## 2024-02-03 RX ORDER — FUROSEMIDE 10 MG/ML
40 INJECTION INTRAMUSCULAR; INTRAVENOUS ONCE
Status: DISCONTINUED | OUTPATIENT
Start: 2024-02-03 | End: 2024-02-03

## 2024-02-03 RX ORDER — TORSEMIDE 100 MG/1
100 TABLET ORAL DAILY
Status: DISCONTINUED | OUTPATIENT
Start: 2024-02-04 | End: 2024-02-05 | Stop reason: HOSPADM

## 2024-02-03 RX ORDER — METOPROLOL SUCCINATE 50 MG/1
50 TABLET, EXTENDED RELEASE ORAL DAILY
Status: DISCONTINUED | OUTPATIENT
Start: 2024-02-04 | End: 2024-02-05 | Stop reason: HOSPADM

## 2024-02-03 RX ORDER — OXYCODONE HYDROCHLORIDE 5 MG/1
5 TABLET ORAL ONCE
Status: COMPLETED | OUTPATIENT
Start: 2024-02-03 | End: 2024-02-03

## 2024-02-03 RX ORDER — ACETAMINOPHEN 325 MG/1
650 TABLET ORAL EVERY 6 HOURS PRN
Status: DISCONTINUED | OUTPATIENT
Start: 2024-02-03 | End: 2024-02-05 | Stop reason: HOSPADM

## 2024-02-03 RX ORDER — PANTOPRAZOLE SODIUM 40 MG/1
40 TABLET, DELAYED RELEASE ORAL
Status: DISCONTINUED | OUTPATIENT
Start: 2024-02-04 | End: 2024-02-05 | Stop reason: HOSPADM

## 2024-02-03 RX ORDER — POTASSIUM CHLORIDE 20 MEQ/1
40 TABLET, EXTENDED RELEASE ORAL 2 TIMES DAILY
Status: DISCONTINUED | OUTPATIENT
Start: 2024-02-03 | End: 2024-02-05 | Stop reason: HOSPADM

## 2024-02-03 RX ORDER — NYSTATIN 100000 [USP'U]/G
POWDER TOPICAL DAILY PRN
Status: DISCONTINUED | OUTPATIENT
Start: 2024-02-03 | End: 2024-02-05 | Stop reason: HOSPADM

## 2024-02-03 RX ORDER — OXYCODONE HYDROCHLORIDE 5 MG/1
5 TABLET ORAL EVERY 6 HOURS PRN
Status: DISCONTINUED | OUTPATIENT
Start: 2024-02-03 | End: 2024-02-05 | Stop reason: HOSPADM

## 2024-02-03 RX ORDER — METOLAZONE 5 MG/1
5 TABLET ORAL ONCE
Status: COMPLETED | OUTPATIENT
Start: 2024-02-03 | End: 2024-02-03

## 2024-02-03 RX ORDER — ACETAMINOPHEN 325 MG/1
650 TABLET ORAL ONCE
Status: COMPLETED | OUTPATIENT
Start: 2024-02-03 | End: 2024-02-03

## 2024-02-03 RX ORDER — FERROUS GLUCONATE 324(38)MG
162 TABLET ORAL 3 TIMES WEEKLY
Status: DISCONTINUED | OUTPATIENT
Start: 2024-02-05 | End: 2024-02-05 | Stop reason: HOSPADM

## 2024-02-03 RX ADMIN — OXYCODONE HYDROCHLORIDE 5 MG: 5 TABLET ORAL at 17:14

## 2024-02-03 RX ADMIN — OXYCODONE HYDROCHLORIDE 5 MG: 5 TABLET ORAL at 23:40

## 2024-02-03 RX ADMIN — HYDROMORPHONE HYDROCHLORIDE 0.5 MG: 1 INJECTION, SOLUTION INTRAMUSCULAR; INTRAVENOUS; SUBCUTANEOUS at 13:42

## 2024-02-03 RX ADMIN — RIVAROXABAN 15 MG: 15 TABLET, FILM COATED ORAL at 17:14

## 2024-02-03 RX ADMIN — METOLAZONE 5 MG: 5 TABLET ORAL at 18:14

## 2024-02-03 RX ADMIN — DOXAZOSIN 2 MG: 1 TABLET ORAL at 21:46

## 2024-02-03 RX ADMIN — OXYCODONE HYDROCHLORIDE 5 MG: 5 TABLET ORAL at 11:23

## 2024-02-03 RX ADMIN — POTASSIUM CHLORIDE 40 MEQ: 1500 TABLET, EXTENDED RELEASE ORAL at 17:16

## 2024-02-03 RX ADMIN — ACETAMINOPHEN 650 MG: 325 TABLET, FILM COATED ORAL at 11:23
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
## 2024-02-03 NOTE — ASSESSMENT & PLAN NOTE
Patient has not been taking potassium chloride outpatient regularly.    Plan:  Continue potassium chloride 40 mEq twice daily  Monitor potassium

## 2024-02-03 NOTE — LETTER
Thank you for allowing us to participate in the care of your patient, Lupe Menjivar, who was hospitalized from 2/3/2024 through 2/5/2024 with the admitting diagnosis of acute on chronic diastolic congestive heart failure.      If you have any additional questions or would like to discuss further, please feel free to contact me.    Cindi Gilliam DO  [facility], Hospitalist  [phone]

## 2024-02-03 NOTE — ASSESSMENT & PLAN NOTE
Patient's rhythm is A flutter along with ventricular pacing currently rate controlled in the 60's.    Plan:  Continue home metoprolol and Xarelto.

## 2024-02-03 NOTE — ED NOTES
Patient ambulates to restroom with no difficulty; steady gate.      Edita Franco RN  02/03/24 [id number]

## 2024-02-03 NOTE — ASSESSMENT & PLAN NOTE
Lab Results   Component Value Date    EGFR 39 02/03/2024    EGFR 30 01/11/2024    EGFR 32 (L) 01/05/2024    CREATININE 1.45 (H) 02/03/2024    CREATININE 1.82 (H) 01/11/2024    CREATININE 1.81 (H) 01/05/2024     Baseline 2.2-2.5 with improved creatinine over the last month.  Has been compliant with torsemide at home, noted swelling of lower extremities  She had forgotten to take the as needed metolazone prescribed for increased swelling/weight gain.    Plan:  Monitor creatinine  Avoid nephrotoxic agents

## 2024-02-03 NOTE — ASSESSMENT & PLAN NOTE
Wt Readings from Last 3 Encounters:   02/03/24 101 kg (222 lb 10.6 oz)   01/03/24 108 kg (237 lb 3.2 oz)   12/01/23 107 kg (235 lb 8 oz)   -Substernal chest pressure and lower extremity edema  -EKG shows rate controlled a flutter, troponins flat.  -BNP elevated 600s with prior elevations to the 1,000's  -Compliant with outpatient torsemide, also has as needed metolazone for weight gain due to fluid retention.  Patient states she forgot about the as needed medication.      Plan:  Continue home torsemide  One dose of metolazone  Monitor intake and output, reassess volume status tomorrow.  Monitor cr and electrolytes.

## 2024-02-03 NOTE — PLAN OF CARE
Problem: PAIN - ADULT  Goal: Verbalizes/displays adequate comfort level or baseline comfort level  Description: Interventions:  - Encourage patient to monitor pain and request assistance  - Assess pain using appropriate pain scale  - Administer analgesics based on type and severity of pain and evaluate response  - Implement non-pharmacological measures as appropriate and evaluate response  - Consider cultural and social influences on pain and pain management  - Notify physician/advanced practitioner if interventions unsuccessful or patient reports new pain  Outcome: Progressing     Problem: SAFETY ADULT  Goal: Patient will remain free of falls  Description: INTERVENTIONS:  - Educate patient/family on patient safety including physical limitations  - Instruct patient to call for assistance with activity   - Consult OT/PT to assist with strengthening/mobility   - Keep Call bell within reach  - Keep bed low and locked with side rails adjusted as appropriate  - Keep care items and personal belongings within reach  - Initiate and maintain comfort rounds  - Make Fall Risk Sign visible to staff  - Offer Toileting every 2 Hours, in advance of need  - Apply yellow socks and bracelet for high fall risk patients  - Consider moving patient to room near nurses station  Outcome: Progressing  Goal: Maintain or return to baseline ADL function  Description: INTERVENTIONS:  -  Assess patient's ability to carry out ADLs; assess patient's baseline for ADL function and identify physical deficits which impact ability to perform ADLs (bathing, care of mouth/teeth, toileting, grooming, dressing, etc.)  - Assess/evaluate cause of self-care deficits   - Assess range of motion  - Assess patient's mobility; develop plan if impaired  - Assess patient's need for assistive devices and provide as appropriate  - Encourage maximum independence but intervene and supervise when necessary  - Involve family in performance of ADLs  - Assess for home  care needs following discharge   - Consider OT consult to assist with ADL evaluation and planning for discharge  - Provide patient education as appropriate  Outcome: Progressing  Goal: Maintains/Returns to pre admission functional level  Description: INTERVENTIONS:  - Perform AM-PAC 6 Click Basic Mobility/ Daily Activity assessment daily.  - Set and communicate daily mobility goal to care team and patient/family/caregiver.   - Collaborate with rehabilitation services on mobility goals if consulted  - Perform Range of Motion 3 times a day.  - Reposition patient every 2 hours.  - Dangle patient 3 times a day  - Stand patient 3 times a day  - Ambulate patient 3 times a day  - Out of bed to chair 3 times a day   - Out of bed for meals 3 times a day  - Out of bed for toileting  - Record patient progress and toleration of activity level   Outcome: Progressing     Problem: Knowledge Deficit  Goal: Patient/family/caregiver demonstrates understanding of disease process, treatment plan, medications, and discharge instructions  Description: Complete learning assessment and assess knowledge base.  Interventions:  - Provide teaching at level of understanding  - Provide teaching via preferred learning methods  Outcome: Progressing

## 2024-02-03 NOTE — ASSESSMENT & PLAN NOTE
Likely secondary to elevated BP to 190's and mild CHF exacerbation.  Initial increase from 30 to 50 with flattening of troponin's thereafter.  EKG shows no acute changes when compared to priors.     Plan:  See above for CHF  Continue home meds and monitor BP

## 2024-02-03 NOTE — ASSESSMENT & PLAN NOTE
Has been smoking since the age of 14, has cut down from two packs a day to about 4-5 cigarettes a day.  Initially refused nicotine patch due to it causing nausea, but is currently requesting it.    Plan:  Nicotine patch ordered
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
## 2024-02-04 LAB
ANION GAP SERPL CALCULATED.3IONS-SCNC: 6 MMOL/L
ATRIAL RATE: 286 BPM
BASOPHILS # BLD AUTO: 0.02 THOUSANDS/ÂΜL (ref 0–0.1)
BASOPHILS NFR BLD AUTO: 0 % (ref 0–1)
BUN SERPL-MCNC: 15 MG/DL (ref 5–25)
CALCIUM SERPL-MCNC: 9.1 MG/DL (ref 8.4–10.2)
CHLORIDE SERPL-SCNC: 104 MMOL/L (ref 96–108)
CO2 SERPL-SCNC: 25 MMOL/L (ref 21–32)
CREAT SERPL-MCNC: 1.71 MG/DL (ref 0.6–1.3)
EOSINOPHIL # BLD AUTO: 0.08 THOUSAND/ÂΜL (ref 0–0.61)
EOSINOPHIL NFR BLD AUTO: 1 % (ref 0–6)
ERYTHROCYTE [DISTWIDTH] IN BLOOD BY AUTOMATED COUNT: 14.5 % (ref 11.6–15.1)
GFR SERPL CREATININE-BSD FRML MDRD: 32 ML/MIN/1.73SQ M
GLUCOSE SERPL-MCNC: 86 MG/DL (ref 65–140)
HCT VFR BLD AUTO: 39.2 % (ref 34.8–46.1)
HGB BLD-MCNC: 12.2 G/DL (ref 11.5–15.4)
IMM GRANULOCYTES # BLD AUTO: 0.04 THOUSAND/UL (ref 0–0.2)
IMM GRANULOCYTES NFR BLD AUTO: 1 % (ref 0–2)
LYMPHOCYTES # BLD AUTO: 1.39 THOUSANDS/ÂΜL (ref 0.6–4.47)
LYMPHOCYTES NFR BLD AUTO: 23 % (ref 14–44)
MCH RBC QN AUTO: 26.7 PG (ref 26.8–34.3)
MCHC RBC AUTO-ENTMCNC: 31.1 G/DL (ref 31.4–37.4)
MCV RBC AUTO: 86 FL (ref 82–98)
MONOCYTES # BLD AUTO: 0.38 THOUSAND/ÂΜL (ref 0.17–1.22)
MONOCYTES NFR BLD AUTO: 6 % (ref 4–12)
NEUTROPHILS # BLD AUTO: 4.16 THOUSANDS/ÂΜL (ref 1.85–7.62)
NEUTS SEG NFR BLD AUTO: 69 % (ref 43–75)
NRBC BLD AUTO-RTO: 0 /100 WBCS
PLATELET # BLD AUTO: 161 THOUSANDS/UL (ref 149–390)
PMV BLD AUTO: 11.5 FL (ref 8.9–12.7)
POTASSIUM SERPL-SCNC: 4.4 MMOL/L (ref 3.5–5.3)
QRS AXIS: -71 DEGREES
QRSD INTERVAL: 224 MS
QT INTERVAL: 548 MS
QTC INTERVAL: 551 MS
RBC # BLD AUTO: 4.57 MILLION/UL (ref 3.81–5.12)
SODIUM SERPL-SCNC: 135 MMOL/L (ref 135–147)
T WAVE AXIS: 99 DEGREES
VENTRICULAR RATE: 61 BPM
WBC # BLD AUTO: 6.07 THOUSAND/UL (ref 4.31–10.16)

## 2024-02-04 PROCEDURE — 85025 COMPLETE CBC W/AUTO DIFF WBC: CPT

## 2024-02-04 PROCEDURE — 99232 SBSQ HOSP IP/OBS MODERATE 35: CPT | Performed by: STUDENT IN AN ORGANIZED HEALTH CARE EDUCATION/TRAINING PROGRAM

## 2024-02-04 PROCEDURE — 80048 BASIC METABOLIC PNL TOTAL CA: CPT

## 2024-02-04 PROCEDURE — 93005 ELECTROCARDIOGRAM TRACING: CPT

## 2024-02-04 RX ORDER — METOLAZONE 5 MG/1
5 TABLET ORAL DAILY
Status: DISCONTINUED | OUTPATIENT
Start: 2024-02-04 | End: 2024-02-05

## 2024-02-04 RX ORDER — SUMATRIPTAN 6 MG/.5ML
6 INJECTION, SOLUTION SUBCUTANEOUS
Status: COMPLETED | OUTPATIENT
Start: 2024-02-04 | End: 2024-02-04

## 2024-02-04 RX ORDER — SODIUM CHLORIDE 9 MG/ML
100 INJECTION, SOLUTION INTRAVENOUS ONCE
Status: DISCONTINUED | OUTPATIENT
Start: 2024-02-04 | End: 2024-02-04

## 2024-02-04 RX ORDER — DEXAMETHASONE SODIUM PHOSPHATE 10 MG/ML
10 INJECTION, SOLUTION INTRAMUSCULAR; INTRAVENOUS ONCE
Status: COMPLETED | OUTPATIENT
Start: 2024-02-04 | End: 2024-02-04

## 2024-02-04 RX ORDER — ACETAMINOPHEN 325 MG/1
975 TABLET ORAL ONCE
Status: COMPLETED | OUTPATIENT
Start: 2024-02-04 | End: 2024-02-04

## 2024-02-04 RX ORDER — DIPHENHYDRAMINE HYDROCHLORIDE 50 MG/ML
25 INJECTION INTRAMUSCULAR; INTRAVENOUS EVERY 8 HOURS SCHEDULED
Status: DISCONTINUED | OUTPATIENT
Start: 2024-02-04 | End: 2024-02-05

## 2024-02-04 RX ORDER — MAGNESIUM SULFATE HEPTAHYDRATE 40 MG/ML
2 INJECTION, SOLUTION INTRAVENOUS
Status: DISCONTINUED | OUTPATIENT
Start: 2024-02-04 | End: 2024-02-05 | Stop reason: HOSPADM

## 2024-02-04 RX ADMIN — DIPHENHYDRAMINE HYDROCHLORIDE 25 MG: 50 INJECTION, SOLUTION INTRAMUSCULAR; INTRAVENOUS at 13:02

## 2024-02-04 RX ADMIN — SUMATRIPTAN 6 MG: 6 INJECTION, SOLUTION SUBCUTANEOUS at 17:38

## 2024-02-04 RX ADMIN — METOPROLOL SUCCINATE 50 MG: 50 TABLET, EXTENDED RELEASE ORAL at 09:40

## 2024-02-04 RX ADMIN — OXYCODONE HYDROCHLORIDE 5 MG: 5 TABLET ORAL at 07:22

## 2024-02-04 RX ADMIN — POTASSIUM CHLORIDE 40 MEQ: 1500 TABLET, EXTENDED RELEASE ORAL at 09:40

## 2024-02-04 RX ADMIN — ACETAMINOPHEN 975 MG: 325 TABLET, FILM COATED ORAL at 10:52

## 2024-02-04 RX ADMIN — MAGNESIUM SULFATE HEPTAHYDRATE 2 G: 40 INJECTION, SOLUTION INTRAVENOUS at 13:06

## 2024-02-04 RX ADMIN — METOLAZONE 5 MG: 5 TABLET ORAL at 10:30

## 2024-02-04 RX ADMIN — DEXAMETHASONE SODIUM PHOSPHATE 10 MG: 10 INJECTION INTRAMUSCULAR; INTRAVENOUS at 13:12

## 2024-02-04 RX ADMIN — DOXAZOSIN 2 MG: 1 TABLET ORAL at 22:17

## 2024-02-04 RX ADMIN — NICOTINE 7 MG: 7 PATCH, EXTENDED RELEASE TRANSDERMAL at 09:40

## 2024-02-04 RX ADMIN — PANTOPRAZOLE SODIUM 40 MG: 40 TABLET, DELAYED RELEASE ORAL at 04:36

## 2024-02-04 RX ADMIN — POTASSIUM CHLORIDE 40 MEQ: 1500 TABLET, EXTENDED RELEASE ORAL at 17:17

## 2024-02-04 RX ADMIN — SUMATRIPTAN 6 MG: 6 INJECTION, SOLUTION SUBCUTANEOUS at 13:03

## 2024-02-04 RX ADMIN — OXYCODONE HYDROCHLORIDE 5 MG: 5 TABLET ORAL at 14:22

## 2024-02-04 RX ADMIN — TORSEMIDE 100 MG: 100 TABLET ORAL at 09:40

## 2024-02-04 RX ADMIN — OXYCODONE HYDROCHLORIDE 5 MG: 5 TABLET ORAL at 21:09

## 2024-02-04 RX ADMIN — RIVAROXABAN 15 MG: 15 TABLET, FILM COATED ORAL at 17:17

## 2024-02-04 RX ADMIN — DIPHENHYDRAMINE HYDROCHLORIDE 25 MG: 50 INJECTION, SOLUTION INTRAMUSCULAR; INTRAVENOUS at 22:17

## 2024-02-04 NOTE — PLAN OF CARE
Problem: PAIN - ADULT  Goal: Verbalizes/displays adequate comfort level or baseline comfort level  Description: Interventions:  - Encourage patient to monitor pain and request assistance  - Assess pain using appropriate pain scale  - Administer analgesics based on type and severity of pain and evaluate response  - Implement non-pharmacological measures as appropriate and evaluate response  - Consider cultural and social influences on pain and pain management  - Notify physician/advanced practitioner if interventions unsuccessful or patient reports new pain  Outcome: Progressing     Problem: SAFETY ADULT  Goal: Patient will remain free of falls  Description: INTERVENTIONS:  - Educate patient/family on patient safety including physical limitations  - Instruct patient to call for assistance with activity   - Consult OT/PT to assist with strengthening/mobility   - Keep Call bell within reach  - Keep bed low and locked with side rails adjusted as appropriate  - Keep care items and personal belongings within reach  - Initiate and maintain comfort rounds  - Make Fall Risk Sign visible to staff  - Offer Toileting every 2 Hours, in advance of need  - Initiate/Maintain bed alarm  - Apply yellow socks and bracelet for high fall risk patients  - Consider moving patient to room near nurses station  Outcome: Progressing  Goal: Maintain or return to baseline ADL function  Description: INTERVENTIONS:  -  Assess patient's ability to carry out ADLs; assess patient's baseline for ADL function and identify physical deficits which impact ability to perform ADLs (bathing, care of mouth/teeth, toileting, grooming, dressing, etc.)  - Assess/evaluate cause of self-care deficits   - Assess range of motion  - Assess patient's mobility; develop plan if impaired  - Assess patient's need for assistive devices and provide as appropriate  - Encourage maximum independence but intervene and supervise when necessary  - Involve family in performance  of ADLs  - Assess for home care needs following discharge   - Consider OT consult to assist with ADL evaluation and planning for discharge  - Provide patient education as appropriate  Outcome: Progressing  Goal: Maintains/Returns to pre admission functional level  Description: INTERVENTIONS:  - Perform AM-PAC 6 Click Basic Mobility/ Daily Activity assessment daily.  - Set and communicate daily mobility goal to care team and patient/family/caregiver.   - Collaborate with rehabilitation services on mobility goals if consulted  - Perform Range of Motion 3 times a day.  - Reposition patient every 2 hours.  - Dangle patient 3 times a day  - Stand patient 3 times a day  - Ambulate patient 3 times a day  - Out of bed to chair 3 times a day   - Out of bed for meals 3 times a day  - Out of bed for toileting  - Record patient progress and toleration of activity level   Outcome: Progressing     Problem: Knowledge Deficit  Goal: Patient/family/caregiver demonstrates understanding of disease process, treatment plan, medications, and discharge instructions  Description: Complete learning assessment and assess knowledge base.  Interventions:  - Provide teaching at level of understanding  - Provide teaching via preferred learning methods  Outcome: Progressing

## 2024-02-04 NOTE — ASSESSMENT & PLAN NOTE
Wt Readings from Last 3 Encounters:   02/04/24 101 kg (222 lb 10.6 oz)   01/03/24 108 kg (237 lb 3.2 oz)   12/01/23 107 kg (235 lb 8 oz)   -Substernal chest pressure and lower extremity edema  -EKG shows rate controlled a flutter, troponins flat.  -BNP elevated 600s with prior elevations to the 1,000's  -Compliant with outpatient torsemide, also has as needed metolazone for weight gain due to fluid retention.  Patient states she forgot about the as needed medication.  - Appears close to dry weight per 2 weeks ago       Plan:  Continue home torsemide  Continued metolazone as scheduled for now, assess if should discontinue 2/5 depending on volume status   Monitor intake and output, reassess volume status tomorrow  Monitor cr and electrolytes

## 2024-02-04 NOTE — ASSESSMENT & PLAN NOTE
Patient's rhythm is A flutter along with ventricular pacing currently rate controlled in the 60's.    Plan:  Continue home metoprolol and Xarelto.  On tele- showed underlying a flutter with ventricular pacing s/p ICD d/t monomorphic VT; discontinued tele as stable

## 2024-02-04 NOTE — UTILIZATION REVIEW
NOTIFICATION OF INPATIENT ADMISSION   AUTHORIZATION REQUEST   SERVICING FACILITY:   [place] [address]  Tax ID: [id number]  NPI: [id number]   ATTENDING PROVIDER:  Attending Name and NPI#: Priscilla Alvarado Md [[id number]]  Address: [address] [place] [address]  Phone: [phone]     ADMISSION INFORMATION:  Place of Service: Inpatient [facility]  Place of Service Code: 21  Inpatient Admission Date/Time: 2/3/24  1:27 PM  Discharge Date/Time: No discharge date for patient encounter.  Admitting Diagnosis Code/Description:  Hypokalemia [E87.6]  CHF (congestive heart failure) (HCC) [I50.9]  Chest pain [R07.9]     UTILIZATION REVIEW CONTACT:  Fredis Ballard Utilization   Network Utilization Review Department  Phone: [phone]  Fax: [phone]  Email: [email or web address]  Contact for approvals/pending authorizations, clinical reviews, and discharge.     PHYSICIAN ADVISORY SERVICES:  Medical Necessity Denial & Rqla-zk-Wbuc Review  Phone: [phone]  Fax: [phone]  Email: [email or web address]     DISCHARGE SUPPORT TEAM:  For Patients Discharge Needs & Updates  Phone: [phone] opt. 2 Fax: [phone]  Email: [email or web address]

## 2024-02-04 NOTE — ASSESSMENT & PLAN NOTE
Wt Readings from Last 3 Encounters:   02/05/24 97.1 kg (214 lb 1.1 oz)   01/03/24 108 kg (237 lb 3.2 oz)   12/01/23 107 kg (235 lb 8 oz)   -Substernal chest pressure and lower extremity edema  -EKG shows rate controlled a flutter, troponins flat.  -BNP elevated 600s with prior elevations to the 1,000's  -Compliant with outpatient torsemide, also has as needed metolazone for weight gain due to fluid retention.  Patient states she forgot about the as needed medication.  - Appears close to dry weight per 2 weeks ago       Plan:  Continue home torsemide  Continue metolazone as needed outpatient   Follow up with PCP and/or cardiologist outpatient

## 2024-02-04 NOTE — PROGRESS NOTES
[facility]  Progress Note  Name: Lupe Menjivar I  MRN: [id number]  Unit/Bed#: S -01 I Date of Admission: 2/3/2024   Date of Service: 2/4/2024 I Hospital Day: 1    Assessment/Plan   * Mild acute on chronic diastolic congestive heart failure (HCC)  Assessment & Plan  Wt Readings from Last 3 Encounters:   02/04/24 101 kg (222 lb 10.6 oz)   01/03/24 108 kg (237 lb 3.2 oz)   12/01/23 107 kg (235 lb 8 oz)   -Substernal chest pressure and lower extremity edema  -EKG shows rate controlled a flutter, troponins flat.  -BNP elevated 600s with prior elevations to the 1,000's  -Compliant with outpatient torsemide, also has as needed metolazone for weight gain due to fluid retention.  Patient states she forgot about the as needed medication.  - Appears close to dry weight per 2 weeks ago       Plan:  Continue home torsemide  Continued metolazone as scheduled for now, assess if should discontinue 2/5 depending on volume status   Monitor intake and output, reassess volume status tomorrow  Monitor cr and electrolytes        Elevated troponin  Assessment & Plan  Likely secondary to elevated BP to 190's and mild CHF exacerbation.  Initial increase from 30 to 50 with flattening of troponin's thereafter.  EKG shows no acute changes when compared to priors.     Plan:  See above for CHF  Continue home meds and monitor BP    Atrial flutter (HCC)  Assessment & Plan  Patient's rhythm is A flutter along with ventricular pacing currently rate controlled in the 60's.    Plan:  Continue home metoprolol and Xarelto.  On tele- showed underlying a flutter with ventricular pacing s/p ICD d/t monomorphic VT; discontinued tele as stable     Hypokalemia  Assessment & Plan  Patient has not been taking potassium chloride outpatient regularly.    Plan:  Continue potassium chloride 40 mEq twice daily  Monitor potassium    Tobacco abuse  Assessment & Plan  Has been smoking since the age of 14, has cut down from two packs a  day to about 4-5 cigarettes a day.  Initially refused nicotine patch due to it causing nausea, but is currently requesting it.    Plan:  Nicotine patch ordered    Stage 4 chronic kidney disease (HCC)  Assessment & Plan  Lab Results   Component Value Date    EGFR 39 02/03/2024    EGFR 30 01/11/2024    EGFR 32 (L) 01/05/2024    CREATININE 1.45 (H) 02/03/2024    CREATININE 1.82 (H) 01/11/2024    CREATININE 1.81 (H) 01/05/2024     Baseline 2.2-2.5 with improved creatinine over the last month.  Has been compliant with torsemide at home, noted swelling of lower extremities  She had forgotten to take the as needed metolazone prescribed for increased swelling/weight gain.    Plan:  Monitor creatinine  Avoid nephrotoxic agents      Gastroesophageal reflux disease   Assessment & Plan  EGD with evidence of Thayer's esophagus 11/23.    continue Protonix           VTE Pharmacologic Prophylaxis: VTE Score: 8 High Risk (Score >/= 5) - Pharmacological DVT Prophylaxis Ordered: rivaroxaban (Xarelto). Sequential Compression Devices Ordered.    Mobility:   Basic Mobility Inpatient Raw Score: 24  JH-HLM Goal: 8: Walk 250 feet or more  JH-HLM Achieved: 8: Walk 250 feet ot more  HLM Goal achieved. Continue to encourage appropriate mobility.    Patient Centered Rounds: I performed bedside rounds with nursing staff today.   Discussions with Specialists or Other Care Team Provider: PT    Education and Discussions with Family / Patient: Updated  () at bedside.    Total Time Spent on Date of Encounter in care of patient: 45 mins. This time was spent on one or more of the following: performing physical exam; counseling and coordination of care; obtaining or reviewing history; documenting in the medical record; reviewing/ordering tests, medications or procedures; communicating with other healthcare professionals and discussing with patient's family/caregivers.    Current Length of Stay: 1 day(s)  Current Patient Status:  Inpatient   Certification Statement: The patient will continue to require additional inpatient hospital stay due to acute on chronic CHF continued management   Discharge Plan: Anticipate discharge tomorrow to discharge location to be determined pending rehab evaluations.    Code Status: Level 1 - Full Code    Subjective:   Patient was seen and examined at bedside this a.m.  Patient has shortness of breath with activity.  Patient continues to have chest pain with tenderness to palpation only and no radiation.  Patient does not feel ready to be discharged home today.  Has no other complaints or issues at this time.    Objective:     Vitals:   Temp (24hrs), Av °F (36.7 °C), Min:97.5 °F (36.4 °C), Max:98.4 °F (36.9 °C)    Temp:  [97.5 °F (36.4 °C)-98.4 °F (36.9 °C)] 97.5 °F (36.4 °C)  HR:  [60-78] 78  Resp:  [18] 18  BP: (112-197)/(65-95) 139/95  SpO2:  [93 %-99 %] 94 %  Body mass index is 34.36 kg/m².     Input and Output Summary (last 24 hours):     Intake/Output Summary (Last 24 hours) at 2024 1234  Last data filed at 2024 1031  Gross per 24 hour   Intake 120 ml   Output 800 ml   Net -680 ml       Physical Exam:   Physical Exam  Vitals reviewed.   Constitutional:       Appearance: Normal appearance. She is not ill-appearing, toxic-appearing or diaphoretic.      Comments: Body mass index is 34.36 kg/m².    HENT:      Head: Normocephalic and atraumatic.      Right Ear: External ear normal.      Left Ear: External ear normal.      Nose: Nose normal.      Mouth/Throat:      Mouth: Mucous membranes are moist.      Pharynx: Oropharynx is clear.   Eyes:      Extraocular Movements: Extraocular movements intact.      Conjunctiva/sclera: Conjunctivae normal.      Pupils: Pupils are equal, round, and reactive to light.   Cardiovascular:      Rate and Rhythm: Normal rate and regular rhythm.      Pulses: Normal pulses.      Heart sounds: Murmur heard.   Pulmonary:      Effort: Pulmonary effort is normal.      Breath  sounds: Normal breath sounds.   Abdominal:      General: Abdomen is flat.      Palpations: Abdomen is soft.   Musculoskeletal:         General: Normal range of motion.      Cervical back: Normal range of motion and neck supple.      Right lower leg: Edema present.      Left lower leg: Edema present.      Comments: 1+ pitting edema bilaterally    Skin:     General: Skin is warm.      Capillary Refill: Capillary refill takes less than 2 seconds.      Coloration: Skin is not jaundiced.      Findings: No erythema.   Neurological:      General: No focal deficit present.      Mental Status: She is alert and oriented to person, place, and time. Mental status is at baseline.   Psychiatric:         Mood and Affect: Mood normal.         Behavior: Behavior normal.          Additional Data:     Labs:  Results from last 7 days   Lab Units 02/03/24  1121   WBC Thousand/uL 7.36   HEMOGLOBIN g/dL 12.3   HEMATOCRIT % 38.7   PLATELETS Thousands/uL 181   NEUTROS PCT % 72   LYMPHS PCT % 21   MONOS PCT % 5   EOS PCT % 1     Results from last 7 days   Lab Units 02/03/24  1121   SODIUM mmol/L 138   POTASSIUM mmol/L 3.3*   CHLORIDE mmol/L 104   CO2 mmol/L 27   BUN mg/dL 12   CREATININE mg/dL 1.45*   ANION GAP mmol/L 7   CALCIUM mg/dL 9.0   ALBUMIN g/dL 3.8   TOTAL BILIRUBIN mg/dL 0.63   ALK PHOS U/L 86   ALT U/L 7   AST U/L 15   GLUCOSE RANDOM mg/dL 104                       Lines/Drains:  Invasive Devices       Peripheral Intravenous Line  Duration             Peripheral IV 02/03/24 Right;Ventral (anterior) Forearm 1 day                      Telemetry:  Telemetry Orders (From admission, onward)               24 Hour Telemetry Monitoring  (ED Bridging Orders Panel)  Continuous x 24 Hours (Telem)        Expiring   Question:  Reason for 24 Hour Telemetry  Answer:  Decompensated CHF- and any one of the following: continuous diuretic infusion or total diuretic dose >200 mg daily, associated electrolyte derangement (I.e. K < 3.0), ionotropic  drip (continuous infusion), hx of ventricular arrhythmia, or new EF < 35%                     Telemetry Reviewed:  underlying atrial flutter with ventricular pacing s/p ICD appears to be pacing well  Indication for Continued Telemetry Use: No indication for continued use. Will discontinue.              Imaging: Reviewed radiology reports from this admission including: chest xray    Recent Cultures (last 7 days):         Last 24 Hours Medication List:   Current Facility-Administered Medications   Medication Dose Route Frequency Provider Last Rate    acetaminophen  650 mg Oral Q6H PRN Mckinley Francis MD      doxazosin  2 mg Oral HS Mckinley Francis MD      [START ON 2/5/2024] ferrous gluconate  162 mg Oral Once per day on Monday Wednesday Friday Mckinley Francis MD      metolazone  5 mg Oral Daily Cindi Gilliam DO      metoprolol succinate  50 mg Oral Daily Mckinley Francis MD      morphine injection  2 mg Intravenous Q2H PRN Cindi Gilliam DO      nicotine  7 mg Transdermal Daily Mckinley Francis MD      nystatin   Topical Daily PRN Mckinley Francis MD      oxyCODONE  5 mg Oral Q6H PRN Mckinley Francis MD      pantoprazole  40 mg Oral Early Morning Mckinley Francis MD      potassium chloride  40 mEq Oral BID Mckinley Francis MD      rivaroxaban  15 mg Oral QPM Mckinley Francis MD      torsemide  100 mg Oral Daily Mckinley Francis MD          Today, Patient Was Seen By: Cindi Gilliam DO    **Please Note: This note may have been constructed using a voice recognition system.**

## 2024-02-04 NOTE — UTILIZATION REVIEW
Initial Clinical Review    Admission: Date/Time/Statement:   Admission Orders (From admission, onward)       Ordered        02/03/24 1327  INPATIENT ADMISSION  Once                          Orders Placed This Encounter   Procedures    INPATIENT ADMISSION     Standing Status:   Standing     Number of Occurrences:   1     Order Specific Question:   Level of Care     Answer:   Med Surg [16]     Order Specific Question:   Estimated length of stay     Answer:   More than 2 Midnights     Order Specific Question:   Certification     Answer:   I certify that inpatient services are medically necessary for this patient for a duration of greater than two midnights. See H&P and MD Progress Notes for additional information about the patient's course of treatment.     ED Arrival Information       Expected   -    Arrival   2/3/2024 10:51    Acuity   Urgent              Means of arrival   Walk-In    Escorted by   Self    Service   Hospitalist    Admission type   Emergency              Arrival complaint   Chest Pain/Dizziness             Chief Complaint   Patient presents with    Chest Pain     Patient here for eval of chest tightness that started last night. Pt reports +SOB with exertion. Hx AFIB/CHF. Denies N.V. States she feels fluid building up on her legs.        Initial Presentation: 58 y.o. female with a PMH of CKD stage IV, diastolic heart failure, GERD, who presents with substernal chest pressure and bilateral leg swelling. She has been having intermittent substernal chest pressure since last night. Pressure lasts about 20 minutes and resolves spontaneously. She also notes swelling in her legs worse from her baseline. Patient has has a recent left knee surgery in 12/214/23 and has been working with PT, is able to ambulate with a cane at this time. She endorses some knee pain and headache. Plan: Inpatient admission for evaluation and treatment of CHF, elevated troponin, hypokalemia, stage 4 CKD, Aflutter, GERD, tobacco  abuse: continue home torsemide, metolazone x 1 dose, monitor creatinine and electrolytes, continue potassium chloride, continue metoprolol, Xarelto, Protonix, start nicotine patch.     Date: 2/4   Day 2:     Internal medicine: continue torsemide. Continue metolazone as scheduled for now, assess if should discontinue 2/5 depending on volume status. Telemetry dc'd.  Continue potassium chloride and monitor. Nicotine patch. Monitor creatinine. Continue Protonix.     ED Triage Vitals [02/03/24 1102]   Temperature Pulse Respirations Blood Pressure SpO2   97.9 °F (36.6 °C) 73 20 158/82 98 %      Temp Source Heart Rate Source Patient Position - Orthostatic VS BP Location FiO2 (%)   Oral Monitor Sitting Right arm --      Pain Score       8          Wt Readings from Last 1 Encounters:   02/04/24 101 kg (222 lb 10.6 oz)     Additional Vital Signs:     Date/Time Temp Pulse Resp BP MAP (mmHg) SpO2 O2 Device   02/04/24 07:21:38 97.5 °F (36.4 °C) 78 -- 139/95 110 94 % --   02/03/24 21:47:42 98.4 °F (36.9 °C) 60 -- 112/65 81 93 % --   02/03/24 1630 -- 61 18 131/68 92 99 % None (Room air)   02/03/24 1515 -- 76 18 197/82 Abnormal  118 97 % None (Room air)   02/03/24 1315 -- 64 18 135/75 -- 98 % None (Room air)     Pertinent Labs/Diagnostic Test Results:   XR chest 2 views   Final Result by Aziza Freeman MD (02/04 0849)      No acute cardiopulmonary disease.            Workstation performed: [id number]           2/3 EKG:  Electronic ventricular pacemaker  Abnormal ECG  When compared with ECG of 19-NOV-2023 19:55,  No significant change was found      Results from last 7 days   Lab Units 02/03/24  1121   WBC Thousand/uL 7.36   HEMOGLOBIN g/dL 12.3   HEMATOCRIT % 38.7   PLATELETS Thousands/uL 181   NEUTROS ABS Thousands/µL 5.29         Results from last 7 days   Lab Units 02/03/24  1121   SODIUM mmol/L 138   POTASSIUM mmol/L 3.3*   CHLORIDE mmol/L 104   CO2 mmol/L 27   ANION GAP mmol/L 7   BUN mg/dL 12   CREATININE mg/dL 1.45*    EGFR ml/min/1.73sq m 39   CALCIUM mg/dL 9.0     Results from last 7 days   Lab Units 02/03/24  1121   AST U/L 15   ALT U/L 7   ALK PHOS U/L 86   TOTAL PROTEIN g/dL 7.4   ALBUMIN g/dL 3.8   TOTAL BILIRUBIN mg/dL 0.63         Results from last 7 days   Lab Units 02/03/24  1121   GLUCOSE RANDOM mg/dL 104         Results from last 7 days   Lab Units 02/03/24  1518 02/03/24  1319 02/03/24  1121   HS TNI 0HR ng/L  --   --  38   HS TNI 2HR ng/L  --  52*  --    HSTNI D2 ng/L  --  14  --    HS TNI 4HR ng/L 56*  --   --    HSTNI D4 ng/L 18  --   --            Results from last 7 days   Lab Units 02/03/24  1121   BNP pg/mL 614*           Results from last 7 days   Lab Units 02/03/24  2148   AMPH/METH  Negative   BARBITURATE UR  Negative   BENZODIAZEPINE UR  Negative   COCAINE UR  Negative   METHADONE URINE  Negative   OPIATE UR  Positive*   PCP UR  Negative   THC UR  Negative         ED Treatment:   Medication Administration from 02/03/2024 1051 to 02/03/2024 1656         Date/Time Order Dose Route Action     02/03/2024 1123 EST oxyCODONE (ROXICODONE) IR tablet 5 mg 5 mg Oral Given     02/03/2024 1123 EST acetaminophen (TYLENOL) tablet 650 mg 650 mg Oral Given     02/03/2024 1342 EST HYDROmorphone (DILAUDID) injection 0.5 mg 0.5 mg Intravenous Given          Past Medical History:   Diagnosis Date    ACS (acute coronary syndrome) (HCC) 02/07/2021    Acute on chronic diastolic CHF 01/23/2019    Anemia 01/14/2023    Arthritis     Atrial fibrillation with rapid ventricular response ([facility]) 03/20/2016    Breast lump     CKD (chronic kidney disease) stage 3, GFR 30-59 ml/min ([facility])     Disease of thyroid gland     Elevated troponin 09/09/2019    Epigastric abdominal tenderness 08/15/2021    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 ([facility]) 05/25/2020    GERD (gastroesophageal reflux disease)     H/O transfusion 1987    Hepatitis C     resolved    History of tachycardia induced cardiomyopathy 05/2021    in  setting of rapid atrial flutter in 05/2021 and again 06/2022    History of ventricular tachycardia 07/2016    s/p ICD    Hyperlipidemia     Hypertension     Hypokalemia 07/23/2023    Inappropriate ICD discharge for atrial flutter 04/2023    NSTEMI (non-ST elevated myocardial infarction) ([facility]) 12/26/2018    Sleep apnea     no cpap     Present on Admission:   Stage 4 chronic kidney disease (HCC)   Mild acute on chronic diastolic congestive heart failure ([facility])   Hypokalemia   Gastroesophageal reflux disease    Atrial flutter ([facility])   Tobacco abuse      Admitting Diagnosis: Hypokalemia [E87.6]  CHF (congestive heart failure) ([facility]) [I50.9]  Chest pain [R07.9]  Age/Sex: 58 y.o. female  Admission Orders:  Scheduled Medications:  doxazosin, 2 mg, Oral, HS  [START ON 2/5/2024] ferrous gluconate, 162 mg, Oral, Once per day on Monday Wednesday Friday  metolazone, 5 mg, Oral, Daily  metoprolol succinate, 50 mg, Oral, Daily  nicotine, 7 mg, Transdermal, Daily  pantoprazole, 40 mg, Oral, Early Morning  potassium chloride, 40 mEq, Oral, BID  rivaroxaban, 15 mg, Oral, QPM  torsemide, 100 mg, Oral, Daily      Continuous IV Infusions:     PRN Meds:  acetaminophen, 650 mg, Oral, Q6H PRN  nystatin, , Topical, Daily PRN  oxyCODONE, 5 mg, Oral, Q6H PRN        None    Network Utilization Review Department  ATTENTION: Please call with any questions or concerns to [phone] and carefully listen to the prompts so that you are directed to the right person. All voicemails are confidential.   For Discharge needs, contact Care Management DC Support Team at [phone] opt. [phone]  Send all requests for admission clinical reviews, approved or denied determinations and any other requests to dedicated fax number below belonging to the campus where the patient is receiving treatment. List of dedicated fax numbers for the Facilities:  FACILITY NAME UR FAX NUMBER   ADMISSION DENIALS (Administrative/Medical Necessity) [phone]   DISCHARGE SUPPORT  TEAM (NETWORK) [phone]   PARENT CHILD HEALTH (Maternity/NICU/Pediatrics) [phone]   [facility] [phone]   [facility] [phone]   [facility] [phone]   [facility] [phone]   [facility] [phone]   [facility] [phone]   [facility] [phone]   [facility] [phone]   [facility] [phone]   Critical access hospital [phone]   [facility] [phone]   [facility] [phone]

## 2024-02-05 ENCOUNTER — APPOINTMENT (INPATIENT)
Dept: CT IMAGING | Facility: HOSPITAL | Age: 59
DRG: 194 | End: 2024-02-05
Payer: COMMERCIAL

## 2024-02-05 VITALS
SYSTOLIC BLOOD PRESSURE: 136 MMHG | DIASTOLIC BLOOD PRESSURE: 85 MMHG | WEIGHT: 214.07 LBS | TEMPERATURE: 97.7 F | OXYGEN SATURATION: 93 % | RESPIRATION RATE: 17 BRPM | HEART RATE: 62 BPM | BODY MASS INDEX: 33.03 KG/M2

## 2024-02-05 LAB
ANION GAP SERPL CALCULATED.3IONS-SCNC: 9 MMOL/L
BASOPHILS # BLD AUTO: 0.02 THOUSANDS/ÂΜL (ref 0–0.1)
BASOPHILS NFR BLD AUTO: 0 % (ref 0–1)
BUN SERPL-MCNC: 24 MG/DL (ref 5–25)
CALCIUM SERPL-MCNC: 9.9 MG/DL (ref 8.4–10.2)
CHLORIDE SERPL-SCNC: 97 MMOL/L (ref 96–108)
CO2 SERPL-SCNC: 28 MMOL/L (ref 21–32)
CREAT SERPL-MCNC: 1.92 MG/DL (ref 0.6–1.3)
EOSINOPHIL # BLD AUTO: 0 THOUSAND/ÂΜL (ref 0–0.61)
EOSINOPHIL NFR BLD AUTO: 0 % (ref 0–6)
ERYTHROCYTE [DISTWIDTH] IN BLOOD BY AUTOMATED COUNT: 14.2 % (ref 11.6–15.1)
GFR SERPL CREATININE-BSD FRML MDRD: 28 ML/MIN/1.73SQ M
GLUCOSE SERPL-MCNC: 124 MG/DL (ref 65–140)
HCT VFR BLD AUTO: 42.3 % (ref 34.8–46.1)
HGB BLD-MCNC: 13.5 G/DL (ref 11.5–15.4)
IMM GRANULOCYTES # BLD AUTO: 0.09 THOUSAND/UL (ref 0–0.2)
IMM GRANULOCYTES NFR BLD AUTO: 1 % (ref 0–2)
LYMPHOCYTES # BLD AUTO: 0.95 THOUSANDS/ÂΜL (ref 0.6–4.47)
LYMPHOCYTES NFR BLD AUTO: 8 % (ref 14–44)
MCH RBC QN AUTO: 26.4 PG (ref 26.8–34.3)
MCHC RBC AUTO-ENTMCNC: 31.9 G/DL (ref 31.4–37.4)
MCV RBC AUTO: 83 FL (ref 82–98)
MONOCYTES # BLD AUTO: 0.34 THOUSAND/ÂΜL (ref 0.17–1.22)
MONOCYTES NFR BLD AUTO: 3 % (ref 4–12)
NEUTROPHILS # BLD AUTO: 10.78 THOUSANDS/ÂΜL (ref 1.85–7.62)
NEUTS SEG NFR BLD AUTO: 88 % (ref 43–75)
NRBC BLD AUTO-RTO: 0 /100 WBCS
PLATELET # BLD AUTO: 207 THOUSANDS/UL (ref 149–390)
PMV BLD AUTO: 11.5 FL (ref 8.9–12.7)
POTASSIUM SERPL-SCNC: 4.1 MMOL/L (ref 3.5–5.3)
RBC # BLD AUTO: 5.12 MILLION/UL (ref 3.81–5.12)
SODIUM SERPL-SCNC: 134 MMOL/L (ref 135–147)
WBC # BLD AUTO: 12.18 THOUSAND/UL (ref 4.31–10.16)

## 2024-02-05 PROCEDURE — G1004 CDSM NDSC: HCPCS

## 2024-02-05 PROCEDURE — 99238 HOSP IP/OBS DSCHRG MGMT 30/<: CPT | Performed by: STUDENT IN AN ORGANIZED HEALTH CARE EDUCATION/TRAINING PROGRAM

## 2024-02-05 PROCEDURE — 85025 COMPLETE CBC W/AUTO DIFF WBC: CPT

## 2024-02-05 PROCEDURE — 74176 CT ABD & PELVIS W/O CONTRAST: CPT

## 2024-02-05 PROCEDURE — 80048 BASIC METABOLIC PNL TOTAL CA: CPT

## 2024-02-05 RX ORDER — DIPHENHYDRAMINE HCL 25 MG
25 TABLET ORAL EVERY 6 HOURS PRN
Status: DISCONTINUED | OUTPATIENT
Start: 2024-02-05 | End: 2024-02-05 | Stop reason: HOSPADM

## 2024-02-05 RX ADMIN — OXYCODONE HYDROCHLORIDE 5 MG: 5 TABLET ORAL at 05:40

## 2024-02-05 RX ADMIN — DIPHENHYDRAMINE HYDROCHLORIDE 25 MG: 50 INJECTION, SOLUTION INTRAMUSCULAR; INTRAVENOUS at 05:40

## 2024-02-05 RX ADMIN — ACETAMINOPHEN 650 MG: 325 TABLET, FILM COATED ORAL at 13:13

## 2024-02-05 RX ADMIN — METOLAZONE 5 MG: 5 TABLET ORAL at 09:53

## 2024-02-05 RX ADMIN — POTASSIUM CHLORIDE 40 MEQ: 1500 TABLET, EXTENDED RELEASE ORAL at 10:00

## 2024-02-05 RX ADMIN — METOPROLOL SUCCINATE 50 MG: 50 TABLET, EXTENDED RELEASE ORAL at 09:54

## 2024-02-05 RX ADMIN — OXYCODONE HYDROCHLORIDE 5 MG: 5 TABLET ORAL at 10:55

## 2024-02-05 RX ADMIN — NICOTINE 7 MG: 7 PATCH, EXTENDED RELEASE TRANSDERMAL at 10:00

## 2024-02-05 RX ADMIN — TORSEMIDE 100 MG: 100 TABLET ORAL at 10:00

## 2024-02-05 RX ADMIN — DIPHENHYDRAMINE HYDROCHLORIDE 25 MG: 25 TABLET ORAL at 14:07

## 2024-02-05 RX ADMIN — PANTOPRAZOLE SODIUM 40 MG: 40 TABLET, DELAYED RELEASE ORAL at 05:40

## 2024-02-05 RX ADMIN — OXYCODONE HYDROCHLORIDE 5 MG: 5 TABLET ORAL at 16:07

## 2024-02-05 RX ADMIN — MAGNESIUM SULFATE HEPTAHYDRATE 2 G: 40 INJECTION, SOLUTION INTRAVENOUS at 10:00

## 2024-02-05 NOTE — DISCHARGE INSTR - AVS FIRST PAGE
Dear Lupe Menjivar,     It was our pleasure to care for you here at [facility].  It is our hope that we were always able to exceed the expected standards for your care during your stay.  You were hospitalized due to mild acute diastolic congestive heart failure.  You were cared for on the 3rd floor by Cindi Gilliam DO under the service of Priscilla Chowdhury MD with the [facility] who covers for your primary care physician (PCP), Rhina Pettit MD, while you were hospitalized.  If you have any questions or concerns related to this hospitalization, you may contact us at .  For follow up as well as any medication refills, we recommend that you follow up with your primary care physician.  A registered nurse will reach out to you by phone within a few days after your discharge to answer any additional questions that you may have after going home.  However, at this time we provide for you here, the most important instructions / recommendations at discharge:     Notable Medication Adjustments -   Continue taking home medications as prescribed, take metalozone as needed according to package instructions  Testing Required after Discharge -   None  Important follow up information -   Follow up with your PCP within one week of discharge; discuss results of CT abdomen pelvis (right lower lobe granuloma, exophytic density from mid left kidney 8 mm, and bone island seen on right iliac bone, as well as other findings present that likely were previously known).    Follow up with your cardiologist outpatient   Other Instructions -   None  Please review this entire after visit summary as additional general instructions including medication list, appointments, activity, diet, any pertinent wound care, and other additional recommendations from your care team that may be provided for you.      Sincerely,     Cindi Gilliam DO

## 2024-02-05 NOTE — PLAN OF CARE
Problem: PAIN - ADULT  Goal: Verbalizes/displays adequate comfort level or baseline comfort level  Description: Interventions:  - Encourage patient to monitor pain and request assistance  - Assess pain using appropriate pain scale  - Administer analgesics based on type and severity of pain and evaluate response  - Implement non-pharmacological measures as appropriate and evaluate response  - Consider cultural and social influences on pain and pain management  - Notify physician/advanced practitioner if interventions unsuccessful or patient reports new pain  Outcome: Completed     Problem: SAFETY ADULT  Goal: Patient will remain free of falls  Description: INTERVENTIONS:  - Educate patient/family on patient safety including physical limitations  - Instruct patient to call for assistance with activity   - Consult OT/PT to assist with strengthening/mobility   - Keep Call bell within reach  - Keep bed low and locked with side rails adjusted as appropriate  - Keep care items and personal belongings within reach  - Initiate and maintain comfort rounds  - Make Fall Risk Sign visible to staff  - Offer Toileting every  Hours, in advance of need  - Initiate/Maintain alarm  - Obtain necessary fall risk management equipment:   - Apply yellow socks and bracelet for high fall risk patients  - Consider moving patient to room near nurses station  Outcome: Completed  Goal: Maintain or return to baseline ADL function  Description: INTERVENTIONS:  -  Assess patient's ability to carry out ADLs; assess patient's baseline for ADL function and identify physical deficits which impact ability to perform ADLs (bathing, care of mouth/teeth, toileting, grooming, dressing, etc.)  - Assess/evaluate cause of self-care deficits   - Assess range of motion  - Assess patient's mobility; develop plan if impaired  - Assess patient's need for assistive devices and provide as appropriate  - Encourage maximum independence but intervene and supervise  when necessary  - Involve family in performance of ADLs  - Assess for home care needs following discharge   - Consider OT consult to assist with ADL evaluation and planning for discharge  - Provide patient education as appropriate  Outcome: Completed  Goal: Maintains/Returns to pre admission functional level  Description: INTERVENTIONS:  - Perform AM-PAC 6 Click Basic Mobility/ Daily Activity assessment daily.  - Set and communicate daily mobility goal to care team and patient/family/caregiver.   - Collaborate with rehabilitation services on mobility goals if consulted  - Perform Range of Motion  times a day.  - Reposition patient every  hours.  - Dangle patient  times a day  - Stand patient  times a day  - Ambulate patient  times a day  - Out of bed to chair  times a day   - Out of bed for meals imes a day  - Out of bed for toileting  - Record patient progress and toleration of activity level   Outcome: Completed     Problem: Knowledge Deficit  Goal: Patient/family/caregiver demonstrates understanding of disease process, treatment plan, medications, and discharge instructions  Description: Complete learning assessment and assess knowledge base.  Interventions:  - Provide teaching at level of understanding  - Provide teaching via preferred learning methods  Outcome: Completed

## 2024-02-05 NOTE — ASSESSMENT & PLAN NOTE
Has been smoking since the age of 14, has cut down from two packs a day to about 4-5 cigarettes a day.  Initially refused nicotine patch due to it causing nausea, but is currently requesting it.    Plan:  Smoking cessation materials provided upon dc, used nicotine patch while inpatient

## 2024-02-05 NOTE — ASSESSMENT & PLAN NOTE
Lab Results   Component Value Date    EGFR 28 02/05/2024    EGFR 32 02/04/2024    EGFR 39 02/03/2024    CREATININE 1.92 (H) 02/05/2024    CREATININE 1.71 (H) 02/04/2024    CREATININE 1.45 (H) 02/03/2024     Baseline 2.2-2.5 with improved creatinine over the last month.  Has been compliant with torsemide at home, noted swelling of lower extremities  She had forgotten to take the as needed metolazone prescribed for increased swelling/weight gain  Increased creatinine likely due to diuretics as well as use of metalazone in order to improve fluid overloaded status upon admission.    Plan:  Continue home meds   Avoid nephrotoxic medications   Encourage po intake

## 2024-02-05 NOTE — DISCHARGE SUMMARY
[facility]  Progress Note  Name: Lupe Menjivar I  MRN: [id number]  Unit/Bed#: S -01 I Date of Admission: 2/3/2024   Date of Service: 2/5/2024 I Hospital Day: 2    Assessment/Plan   * Mild acute on chronic diastolic congestive heart failure (HCC)  Assessment & Plan  Wt Readings from Last 3 Encounters:   02/05/24 97.1 kg (214 lb 1.1 oz)   01/03/24 108 kg (237 lb 3.2 oz)   12/01/23 107 kg (235 lb 8 oz)   -Substernal chest pressure and lower extremity edema  -EKG shows rate controlled a flutter, troponins flat.  -BNP elevated 600s with prior elevations to the 1,000's  -Compliant with outpatient torsemide, also has as needed metolazone for weight gain due to fluid retention.  Patient states she forgot about the as needed medication.  - Appears close to dry weight per 2 weeks ago       Plan:  Continue home torsemide  Continue metolazone as needed outpatient   Follow up with PCP and/or cardiologist outpatient         Elevated troponin  Assessment & Plan  Likely secondary to elevated BP to 190's and mild CHF exacerbation.  Initial increase from 30 to 50 with flattening of troponin's thereafter.  EKG shows no acute changes when compared to priors.     Plan:  See above for CHF  Continue home meds    Atrial flutter (HCC)  Assessment & Plan  Patient's rhythm is A flutter along with ventricular pacing currently rate controlled in the 60's.  On tele- showed underlying a flutter with ventricular pacing s/p ICD d/t monomorphic VT; discontinued tele    Plan:  Continue home metoprolol and Xarelto    Hypokalemia  Assessment & Plan  Patient has not been taking potassium chloride outpatient regularly.    Plan:  Continue potassium chloride 40 mEq twice daily  Follow up with PCP outpatient     Tobacco abuse  Assessment & Plan  Has been smoking since the age of 14, has cut down from two packs a day to about 4-5 cigarettes a day.  Initially refused nicotine patch due to it causing nausea, but is currently  requesting it.    Plan:  Smoking cessation materials provided upon dc, used nicotine patch while inpatient     Stage 4 chronic kidney disease (HCC)  Assessment & Plan  Lab Results   Component Value Date    EGFR 28 02/05/2024    EGFR 32 02/04/2024    EGFR 39 02/03/2024    CREATININE 1.92 (H) 02/05/2024    CREATININE 1.71 (H) 02/04/2024    CREATININE 1.45 (H) 02/03/2024     Baseline 2.2-2.5 with improved creatinine over the last month.  Has been compliant with torsemide at home, noted swelling of lower extremities  She had forgotten to take the as needed metolazone prescribed for increased swelling/weight gain  Increased creatinine likely due to diuretics as well as use of metalazone in order to improve fluid overloaded status upon admission.    Plan:  Continue home meds   Avoid nephrotoxic medications   Encourage po intake       Gastroesophageal reflux disease   Assessment & Plan  EGD with evidence of Thayer's esophagus 11/23.    continue Protonix         Medical Problems       Resolved Problems  Date Reviewed: 2/5/2024   None       Discharging Physician / Practitioner: Cindi Gilliam DO  PCP: Rhina Pettit MD  Admission Date:   Admission Orders (From admission, onward)       Ordered        02/03/24 1327  INPATIENT ADMISSION  Once                          Discharge Date: 02/05/24    Consultations During Hospital Stay:  None    Procedures Performed:   CT abdomen and pelvis without contrast  CXR    Significant Findings / Test Results:   CT abdomen pelvis wo contrast   Final Result by Chandrika Jackson MD (02/05 1243)      No acute inflammatory stranding   No hydronephrosis      Workstation performed: [id number]         XR chest 2 views   Final Result by Aziza Freeman MD (02/04 0823)      No acute cardiopulmonary disease.            Workstation performed: [id number]             Incidental Findings:   Right lower lobe granuloma, nonobstructing calculus 2 mm upper pole right kidney (not incidental),  exophytic density mid left kidney 8 mm too small to characterize, bone island seen right iliac bone    I reviewed the above mentioned incidental findings with the patient and/or family and they expressed understanding.    Test Results Pending at Discharge (will require follow up):   None     Outpatient Tests Requested:  None    Complications:  None    Reason for Admission: Mild acute on chronic diastolic CHF    Hospital Course:   Lupe Menjivar is a 58 y.o. female patient who originally presented to the hospital on 2/3/2024 due to chest discomfort and shortness of breath with activity.  Patient forgot that she could use metolazone as needed for increasing lower extremity edema or weight gain.  Patient presented to the hospital in an acute exacerbation of CHF.  Patient was mildly volume overloaded upon admission.  Lungs CTA was negative.  BNP was elevated but no new changes on EKG.  She was placed on telemetry given underlying a flutter.  Telemetry was discontinued due to rate controlled atrial flutter with ventricular pacing from ICD.  She was resumed on home torsemide 100 mg daily.  Patient was also given several doses of metolazone while in the hospital for further diuresis.  Calcium was replaced due to hypokalemia.  Patient had an elevated creatinine upon admission, however this is below her baseline.  Patient had a bump in her creatinine likely due to diuretics and metolazone use while in the hospital.  Encourage patient to hydrate well and avoid nephrotoxic medications.  Patient also had a mild headache while in the hospital and received treatment for this.  Patient had mild tenderness to palpation in the left lower quadrant and given white blood count elevation and previous history of diverticulitis, CT abdomen pelvis without contrast was performed.  No acute abnormality seen on imaging.  White blood count elevation was likely due to dexamethasone that was given the day prior for patient's headache.  Patient  feels improved and is stable for discharge.  Patient was discharged to home.    Please see above list of diagnoses and related plan for additional information.     Condition at Discharge: good    Discharge Day Visit / Exam:   Subjective:  Patient had a mild headache this morning but which has since improved according to RN. Patient was also having mild tenderness to palpation this am as well but states she feels better this afternoon per RN. Patient has no other complaints or issues at this time, and she feels ready to be home today.   Vitals: Blood Pressure: 136/85 (02/05/24 0716)  Pulse: 62 (02/05/24 0716)  Temperature: 97.7 °F (36.5 °C) (02/05/24 0716)  Temp Source: Oral (02/04/24 1448)  Respirations: 17 (02/05/24 0716)  Weight - Scale: 97.1 kg (214 lb 1.1 oz) (02/05/24 0600)  SpO2: 93 % (02/05/24 0716)  Exam:   Physical Exam  Vitals reviewed.   Constitutional:       Appearance: Normal appearance. She is not ill-appearing, toxic-appearing or diaphoretic.   HENT:      Head: Normocephalic and atraumatic.      Right Ear: External ear normal.      Left Ear: External ear normal.      Nose: Nose normal.      Mouth/Throat:      Mouth: Mucous membranes are moist.      Pharynx: Oropharynx is clear.   Eyes:      Extraocular Movements: Extraocular movements intact.      Conjunctiva/sclera: Conjunctivae normal.      Pupils: Pupils are equal, round, and reactive to light.   Cardiovascular:      Rate and Rhythm: Normal rate and regular rhythm.      Pulses: Normal pulses.      Heart sounds: Normal heart sounds.   Pulmonary:      Effort: Pulmonary effort is normal.      Breath sounds: Normal breath sounds.   Abdominal:      General: Abdomen is flat.      Palpations: Abdomen is soft.      Tenderness: There is abdominal tenderness.      Comments: Mild tenderness to palpation of LLQ   Musculoskeletal:         General: Normal range of motion.      Cervical back: Normal range of motion and neck supple.      Right lower leg: No edema.       Left lower leg: No edema.   Skin:     General: Skin is warm.      Capillary Refill: Capillary refill takes less than 2 seconds.      Coloration: Skin is not jaundiced.      Findings: No erythema.   Neurological:      General: No focal deficit present.      Mental Status: She is alert and oriented to person, place, and time. Mental status is at baseline.   Psychiatric:         Mood and Affect: Mood normal.         Behavior: Behavior normal.          Discussion with Family: Patient declined call to .     Discharge instructions/Information to patient and family:   See after visit summary for information provided to patient and family.      Provisions for Follow-Up Care:  See after visit summary for information related to follow-up care and any pertinent home health orders.      Mobility at time of Discharge:   Basic Mobility Inpatient Raw Score: 24  JH-HLM Goal: 8: Walk 250 feet or more  JH-HLM Achieved: 8: Walk 250 feet ot more  HLM Goal achieved. Continue to encourage appropriate mobility.     Disposition:   Home    Planned Readmission: No     Discharge Statement:  I spent 45 minutes discharging the patient. This time was spent on the day of discharge. I had direct contact with the patient on the day of discharge. Greater than 50% of the total time was spent examining patient, answering all patient questions, arranging and discussing plan of care with patient as well as directly providing post-discharge instructions.  Additional time then spent on discharge activities.    Discharge Medications:  See after visit summary for reconciled discharge medications provided to patient and/or family.      **Please Note: This note may have been constructed using a voice recognition system**

## 2024-02-05 NOTE — ASSESSMENT & PLAN NOTE
Patient has not been taking potassium chloride outpatient regularly.    Plan:  Continue potassium chloride 40 mEq twice daily  Follow up with PCP outpatient

## 2024-02-05 NOTE — ASSESSMENT & PLAN NOTE
Likely secondary to elevated BP to 190's and mild CHF exacerbation.  Initial increase from 30 to 50 with flattening of troponin's thereafter.  EKG shows no acute changes when compared to priors.     Plan:  See above for CHF  Continue home meds

## 2024-02-05 NOTE — INCIDENTAL FINDINGS
The following findings require follow up:  Radiographic finding   Finding:      ABDOMEN     LOWER CHEST: No clinically significant abnormality in the visualized lower chest.  Right lower lobe granuloma  LIVER/BILIARY TREE: Unremarkable.     GALLBLADDER: Gallbladder surgically absent  The CBD measures 8 mm within the expected range  SPLEEN: Unremarkable.     PANCREAS: Unremarkable.     ADRENAL GLANDS: Unremarkable.     KIDNEYS/URETERS: Nonobstructing calculus measuring about 2 mm upper pole right kidney  No hydronephrosis  And exophytic density seen from the mid left kidney measuring about 8 mm, too small characterize  STOMACH AND BOWEL: Unremarkable.     APPENDIX: Diverticulosis seen. There is no abnormal dilatation of the bowel loops     ABDOMINOPELVIC CAVITY: No ascites. No pneumoperitoneum. No lymphadenopathy.     VESSELS: Unremarkable for patient's age.     PELVIS     REPRODUCTIVE ORGANS: Uterus is surgically absent  No adnexal mass seen     URINARY BLADDER: Unremarkable.     ABDOMINAL WALL/INGUINAL REGIONS: Unremarkable.     BONES: No acute compression collapse of the vetebra there are no gross lytic lesion  Bone island seen right iliac bone  Left hip arthroplasty  IMPRESSION:     No acute inflammatory stranding  No hydronephrosis     Follow up required: PCP   Follow up should be done within 1 week(s)    Please notify the following clinician to assist with the follow up:   Dr. Rhina Pettit MD

## 2024-02-05 NOTE — ASSESSMENT & PLAN NOTE
Patient's rhythm is A flutter along with ventricular pacing currently rate controlled in the 60's.  On tele- showed underlying a flutter with ventricular pacing s/p ICD d/t monomorphic VT; discontinued tele    Plan:  Continue home metoprolol and Xarelto

## 2024-02-06 ENCOUNTER — TELEPHONE (OUTPATIENT)
Dept: CARDIOLOGY CLINIC | Facility: CLINIC | Age: 59
End: 2024-02-06

## 2024-02-06 NOTE — TELEPHONE ENCOUNTER
Good morning,    Patient was discharged on 2/5. She has an acute heart failure dx. Hospital follow up should be scheduled within 72 hours of discharge.    Ally - Can you please reach out to the patient.    Thank you,    Mónica

## 2024-02-07 ENCOUNTER — TELEPHONE (OUTPATIENT)
Dept: CARDIOLOGY CLINIC | Facility: CLINIC | Age: 59
End: 2024-02-07

## 2024-02-07 NOTE — TELEPHONE ENCOUNTER
Pt schedule for colonoscopy with [facility] on 3/26/24.    Requesting 2 day hold of xarelto prior to procedure.     Okay to hold?

## 2024-02-07 NOTE — TELEPHONE ENCOUNTER
Xarelto clearance received back from Dr Diamond.  Pt is cleared to hold Xarelto 2 days prior to the procedure.  Will have scanned.  Please call pt to inform. Thank you!

## 2024-02-08 LAB
ATRIAL RATE: 55 BPM
P AXIS: 79 DEGREES
PR INTERVAL: 186 MS
QRS AXIS: 90 DEGREES
QRSD INTERVAL: 82 MS
QT INTERVAL: 274 MS
QTC INTERVAL: 442 MS
T WAVE AXIS: -13 DEGREES
VENTRICULAR RATE: 157 BPM
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
## 2024-02-09 ENCOUNTER — OFFICE VISIT (OUTPATIENT)
Dept: CARDIOLOGY CLINIC | Facility: CLINIC | Age: 59
End: 2024-02-09
Payer: COMMERCIAL

## 2024-02-09 VITALS
HEIGHT: 68 IN | BODY MASS INDEX: 33.1 KG/M2 | HEART RATE: 72 BPM | OXYGEN SATURATION: 95 % | WEIGHT: 218.4 LBS | DIASTOLIC BLOOD PRESSURE: 86 MMHG | SYSTOLIC BLOOD PRESSURE: 144 MMHG

## 2024-02-09 DIAGNOSIS — Z86.79 HISTORY OF VENTRICULAR TACHYCARDIA: ICD-10-CM

## 2024-02-09 DIAGNOSIS — N18.4 STAGE 4 CHRONIC KIDNEY DISEASE (HCC): ICD-10-CM

## 2024-02-09 DIAGNOSIS — I50.33 ACUTE ON CHRONIC HEART FAILURE WITH PRESERVED EJECTION FRACTION (HCC): Primary | ICD-10-CM

## 2024-02-09 DIAGNOSIS — I48.0 PAROXYSMAL A-FIB (HCC): ICD-10-CM

## 2024-02-09 DIAGNOSIS — I10 PRIMARY HYPERTENSION: ICD-10-CM

## 2024-02-09 DIAGNOSIS — G47.33 OBSTRUCTIVE SLEEP APNEA, ADULT: ICD-10-CM

## 2024-02-09 DIAGNOSIS — E78.2 MIXED HYPERLIPIDEMIA: ICD-10-CM

## 2024-02-09 DIAGNOSIS — I48.3 TYPICAL ATRIAL FLUTTER (HCC): ICD-10-CM

## 2024-02-09 DIAGNOSIS — Z72.0 TOBACCO ABUSE: ICD-10-CM

## 2024-02-09 PROCEDURE — 99214 OFFICE O/P EST MOD 30 MIN: CPT

## 2024-02-09 PROCEDURE — 93000 ELECTROCARDIOGRAM COMPLETE: CPT

## 2024-02-09 RX ORDER — CEFPODOXIME PROXETIL 200 MG/1
TABLET, FILM COATED ORAL
COMMUNITY
Start: 2024-01-07 | End: 2024-02-18 | Stop reason: CLARIF

## 2024-02-09 RX ORDER — OXYCODONE HYDROCHLORIDE 5 MG/1
5 TABLET ORAL EVERY 6 HOURS PRN
Status: ON HOLD | COMMUNITY
Start: 2024-01-25

## 2024-02-09 NOTE — PATIENT INSTRUCTIONS
Please continue to monitor your weights daily. If you notice and increase of 2-3 lbs tomorrow morning you can take your metolazone in addition to your torsemide. Please call the office on Monday or send me a Mape message to let me know if your weights have been stable. If you continue to have an increase weight gain over the week, you can follow up with me next week, if not we will see you back in around 4 weeks.     I placed an order for blood work for you, please get that done on Monday 2/12. This is not a fasting test so you can eat or drink prior.     Continue checking your blood pressure once daily. The best time to check your blood pressure is at least one hour after you take your blood pressure medication. Keep a log of your blood pressures. Call the office in 2 weeks with updated blood pressure readings.  If they continue to be elevated we can discuss possible medication changes for better blood pressure control.     Thank you,  CHARLOTTE Belcher

## 2024-02-18 ENCOUNTER — APPOINTMENT (EMERGENCY)
Dept: CT IMAGING | Facility: HOSPITAL | Age: 59
End: 2024-02-18
Payer: COMMERCIAL

## 2024-02-18 ENCOUNTER — APPOINTMENT (EMERGENCY)
Dept: RADIOLOGY | Facility: HOSPITAL | Age: 59
End: 2024-02-18
Payer: COMMERCIAL

## 2024-02-18 ENCOUNTER — HOSPITAL ENCOUNTER (OUTPATIENT)
Facility: HOSPITAL | Age: 59
Setting detail: OBSERVATION
Discharge: HOME | End: 2024-02-20
Attending: EMERGENCY MEDICINE | Admitting: HOSPITALIST
Payer: COMMERCIAL

## 2024-02-18 DIAGNOSIS — I50.33 ACUTE ON CHRONIC HEART FAILURE WITH PRESERVED EJECTION FRACTION (HCC): ICD-10-CM

## 2024-02-18 DIAGNOSIS — R79.89 ELEVATED TROPONIN: ICD-10-CM

## 2024-02-18 DIAGNOSIS — E87.6 HYPOKALEMIA: ICD-10-CM

## 2024-02-18 DIAGNOSIS — N18.4 STAGE 4 CHRONIC KIDNEY DISEASE (HCC): ICD-10-CM

## 2024-02-18 DIAGNOSIS — K62.5 BRBPR (BRIGHT RED BLOOD PER RECTUM): ICD-10-CM

## 2024-02-18 DIAGNOSIS — R10.9 ABDOMINAL PAIN: ICD-10-CM

## 2024-02-18 DIAGNOSIS — R07.9 CHEST PAIN, UNSPECIFIED: Primary | ICD-10-CM

## 2024-02-18 DIAGNOSIS — K59.00 CONSTIPATION, UNSPECIFIED CONSTIPATION TYPE: ICD-10-CM

## 2024-02-18 PROBLEM — I50.9 CHRONIC CHF (HCC): Status: ACTIVE | Noted: 2024-02-18

## 2024-02-18 PROBLEM — B36.9 FUNGAL SKIN DISEASE: Status: ACTIVE | Noted: 2024-02-18

## 2024-02-18 PROBLEM — Z79.52 ON PREDNISONE THERAPY: Status: ACTIVE | Noted: 2024-02-18

## 2024-02-18 LAB
2HR DELTA HS TROPONIN: 4 NG/L
4HR DELTA HS TROPONIN: 8 NG/L
ALBUMIN SERPL BCP-MCNC: 4 G/DL (ref 3.5–5)
ALP SERPL-CCNC: 77 U/L (ref 34–104)
ALT SERPL W P-5'-P-CCNC: 5 U/L (ref 7–52)
ANION GAP SERPL CALCULATED.3IONS-SCNC: 7 MMOL/L
AST SERPL W P-5'-P-CCNC: 16 U/L (ref 13–39)
ATRIAL RATE: 280 BPM
BACTERIA UR QL AUTO: ABNORMAL /HPF
BASOPHILS # BLD AUTO: 0.03 THOUSANDS/ÂΜL (ref 0–0.1)
BASOPHILS NFR BLD AUTO: 0 % (ref 0–1)
BILIRUB SERPL-MCNC: 0.5 MG/DL (ref 0.2–1)
BILIRUB UR QL STRIP: NEGATIVE
BNP SERPL-MCNC: 772 PG/ML (ref 0–100)
BUN SERPL-MCNC: 15 MG/DL (ref 5–25)
CALCIUM SERPL-MCNC: 9.8 MG/DL (ref 8.4–10.2)
CARDIAC TROPONIN I PNL SERPL HS: 45 NG/L
CARDIAC TROPONIN I PNL SERPL HS: 49 NG/L
CARDIAC TROPONIN I PNL SERPL HS: 53 NG/L
CHLORIDE SERPL-SCNC: 99 MMOL/L (ref 96–108)
CLARITY UR: CLEAR
CO2 SERPL-SCNC: 30 MMOL/L (ref 21–32)
COLOR UR: ABNORMAL
CREAT SERPL-MCNC: 1.58 MG/DL (ref 0.6–1.3)
EOSINOPHIL # BLD AUTO: 0.11 THOUSAND/ÂΜL (ref 0–0.61)
EOSINOPHIL NFR BLD AUTO: 2 % (ref 0–6)
ERYTHROCYTE [DISTWIDTH] IN BLOOD BY AUTOMATED COUNT: 14.6 % (ref 11.6–15.1)
GFR SERPL CREATININE-BSD FRML MDRD: 35 ML/MIN/1.73SQ M
GLUCOSE SERPL-MCNC: 79 MG/DL (ref 65–140)
GLUCOSE UR STRIP-MCNC: NEGATIVE MG/DL
HCT VFR BLD AUTO: 42.5 % (ref 34.8–46.1)
HGB BLD-MCNC: 13.4 G/DL (ref 11.5–15.4)
HGB UR QL STRIP.AUTO: ABNORMAL
IMM GRANULOCYTES # BLD AUTO: 0.02 THOUSAND/UL (ref 0–0.2)
IMM GRANULOCYTES NFR BLD AUTO: 0 % (ref 0–2)
KETONES UR STRIP-MCNC: NEGATIVE MG/DL
LEUKOCYTE ESTERASE UR QL STRIP: NEGATIVE
LYMPHOCYTES # BLD AUTO: 1.3 THOUSANDS/ÂΜL (ref 0.6–4.47)
LYMPHOCYTES NFR BLD AUTO: 19 % (ref 14–44)
MCH RBC QN AUTO: 26.7 PG (ref 26.8–34.3)
MCHC RBC AUTO-ENTMCNC: 31.5 G/DL (ref 31.4–37.4)
MCV RBC AUTO: 85 FL (ref 82–98)
MONOCYTES # BLD AUTO: 0.48 THOUSAND/ÂΜL (ref 0.17–1.22)
MONOCYTES NFR BLD AUTO: 7 % (ref 4–12)
NEUTROPHILS # BLD AUTO: 4.75 THOUSANDS/ÂΜL (ref 1.85–7.62)
NEUTS SEG NFR BLD AUTO: 72 % (ref 43–75)
NITRITE UR QL STRIP: NEGATIVE
NON-SQ EPI CELLS URNS QL MICRO: ABNORMAL /HPF
NRBC BLD AUTO-RTO: 0 /100 WBCS
PH UR STRIP.AUTO: 6.5 [PH]
PLATELET # BLD AUTO: 179 THOUSANDS/UL (ref 149–390)
PMV BLD AUTO: 11.7 FL (ref 8.9–12.7)
POTASSIUM SERPL-SCNC: 4 MMOL/L (ref 3.5–5.3)
PROT SERPL-MCNC: 8 G/DL (ref 6.4–8.4)
PROT UR STRIP-MCNC: NEGATIVE MG/DL
QRS AXIS: -87 DEGREES
QRSD INTERVAL: 166 MS
QT INTERVAL: 488 MS
QTC INTERVAL: 503 MS
RBC # BLD AUTO: 5.02 MILLION/UL (ref 3.81–5.12)
RBC #/AREA URNS AUTO: ABNORMAL /HPF
SODIUM SERPL-SCNC: 136 MMOL/L (ref 135–147)
SP GR UR STRIP.AUTO: 1.01 (ref 1–1.03)
T WAVE AXIS: 93 DEGREES
UROBILINOGEN UR STRIP-ACNC: <2 MG/DL
VENTRICULAR RATE: 64 BPM
WBC # BLD AUTO: 6.69 THOUSAND/UL (ref 4.31–10.16)
WBC #/AREA URNS AUTO: ABNORMAL /HPF

## 2024-02-18 PROCEDURE — 93010 ELECTROCARDIOGRAM REPORT: CPT | Performed by: INTERNAL MEDICINE

## 2024-02-18 PROCEDURE — 80053 COMPREHEN METABOLIC PANEL: CPT | Performed by: EMERGENCY MEDICINE

## 2024-02-18 PROCEDURE — 83880 ASSAY OF NATRIURETIC PEPTIDE: CPT | Performed by: EMERGENCY MEDICINE

## 2024-02-18 PROCEDURE — 96374 THER/PROPH/DIAG INJ IV PUSH: CPT

## 2024-02-18 PROCEDURE — 96375 TX/PRO/DX INJ NEW DRUG ADDON: CPT

## 2024-02-18 PROCEDURE — 81001 URINALYSIS AUTO W/SCOPE: CPT | Performed by: EMERGENCY MEDICINE

## 2024-02-18 PROCEDURE — 99285 EMERGENCY DEPT VISIT HI MDM: CPT | Performed by: EMERGENCY MEDICINE

## 2024-02-18 PROCEDURE — 85025 COMPLETE CBC W/AUTO DIFF WBC: CPT | Performed by: EMERGENCY MEDICINE

## 2024-02-18 PROCEDURE — 99223 1ST HOSP IP/OBS HIGH 75: CPT | Performed by: INTERNAL MEDICINE

## 2024-02-18 PROCEDURE — 99285 EMERGENCY DEPT VISIT HI MDM: CPT

## 2024-02-18 PROCEDURE — 71045 X-RAY EXAM CHEST 1 VIEW: CPT

## 2024-02-18 PROCEDURE — 36415 COLL VENOUS BLD VENIPUNCTURE: CPT

## 2024-02-18 PROCEDURE — 84484 ASSAY OF TROPONIN QUANT: CPT | Performed by: EMERGENCY MEDICINE

## 2024-02-18 PROCEDURE — 93005 ELECTROCARDIOGRAM TRACING: CPT

## 2024-02-18 PROCEDURE — 74176 CT ABD & PELVIS W/O CONTRAST: CPT

## 2024-02-18 PROCEDURE — G1004 CDSM NDSC: HCPCS

## 2024-02-18 RX ORDER — FERROUS GLUCONATE 324(38)MG
324 TABLET ORAL 3 TIMES WEEKLY
Status: DISCONTINUED | OUTPATIENT
Start: 2024-02-19 | End: 2024-02-20 | Stop reason: HOSPADM

## 2024-02-18 RX ORDER — DOXAZOSIN MESYLATE 1 MG/1
2 TABLET ORAL
Status: DISCONTINUED | OUTPATIENT
Start: 2024-02-18 | End: 2024-02-19

## 2024-02-18 RX ORDER — OXYCODONE HYDROCHLORIDE 5 MG/1
5 TABLET ORAL EVERY 4 HOURS PRN
Status: DISCONTINUED | OUTPATIENT
Start: 2024-02-18 | End: 2024-02-20 | Stop reason: HOSPADM

## 2024-02-18 RX ORDER — PREDNISONE 50 MG/1
50 TABLET ORAL DAILY
Status: CANCELLED | OUTPATIENT
Start: 2024-02-19

## 2024-02-18 RX ORDER — METOLAZONE 5 MG/1
5 TABLET ORAL AS NEEDED
Status: CANCELLED | OUTPATIENT
Start: 2024-02-18

## 2024-02-18 RX ORDER — ASPIRIN 81 MG/1
162 TABLET, CHEWABLE ORAL ONCE
Status: COMPLETED | OUTPATIENT
Start: 2024-02-18 | End: 2024-02-18

## 2024-02-18 RX ORDER — ONDANSETRON 4 MG/1
4 TABLET, ORALLY DISINTEGRATING ORAL EVERY 6 HOURS PRN
Status: CANCELLED | OUTPATIENT
Start: 2024-02-18

## 2024-02-18 RX ORDER — HYDROMORPHONE HCL/PF 1 MG/ML
0.5 SYRINGE (ML) INJECTION EVERY 2 HOUR PRN
Status: DISCONTINUED | OUTPATIENT
Start: 2024-02-18 | End: 2024-02-20 | Stop reason: HOSPADM

## 2024-02-18 RX ORDER — ONDANSETRON 2 MG/ML
4 INJECTION INTRAMUSCULAR; INTRAVENOUS ONCE
Status: COMPLETED | OUTPATIENT
Start: 2024-02-18 | End: 2024-02-18

## 2024-02-18 RX ORDER — POTASSIUM CHLORIDE 20 MEQ/1
40 TABLET, EXTENDED RELEASE ORAL 2 TIMES DAILY
Status: CANCELLED | OUTPATIENT
Start: 2024-02-18

## 2024-02-18 RX ORDER — QUINIDINE GLUCONATE 324 MG
120 TABLET, EXTENDED RELEASE ORAL 3 TIMES WEEKLY
Status: CANCELLED | OUTPATIENT
Start: 2024-02-19

## 2024-02-18 RX ORDER — PANTOPRAZOLE SODIUM 40 MG/1
40 TABLET, DELAYED RELEASE ORAL DAILY
Status: DISCONTINUED | OUTPATIENT
Start: 2024-02-19 | End: 2024-02-20 | Stop reason: HOSPADM

## 2024-02-18 RX ORDER — MORPHINE SULFATE 4 MG/ML
4 INJECTION, SOLUTION INTRAMUSCULAR; INTRAVENOUS ONCE
Status: COMPLETED | OUTPATIENT
Start: 2024-02-18 | End: 2024-02-18

## 2024-02-18 RX ORDER — NYSTATIN 100000 [USP'U]/G
POWDER TOPICAL DAILY
Status: CANCELLED | OUTPATIENT
Start: 2024-02-18

## 2024-02-18 RX ORDER — OXYCODONE HYDROCHLORIDE 5 MG/1
5 TABLET ORAL EVERY 6 HOURS PRN
Status: DISCONTINUED | OUTPATIENT
Start: 2024-02-18 | End: 2024-02-18

## 2024-02-18 RX ORDER — NYSTATIN 100000 [USP'U]/G
POWDER TOPICAL DAILY
Status: DISCONTINUED | OUTPATIENT
Start: 2024-02-18 | End: 2024-02-20 | Stop reason: HOSPADM

## 2024-02-18 RX ORDER — DOXAZOSIN MESYLATE 1 MG/1
2 TABLET ORAL
Status: CANCELLED | OUTPATIENT
Start: 2024-02-18

## 2024-02-18 RX ORDER — OXYCODONE HYDROCHLORIDE 5 MG/1
5 TABLET ORAL ONCE
Status: COMPLETED | OUTPATIENT
Start: 2024-02-18 | End: 2024-02-18

## 2024-02-18 RX ORDER — QUINIDINE GLUCONATE 324 MG
120 TABLET, EXTENDED RELEASE ORAL 3 TIMES WEEKLY
Status: DISCONTINUED | OUTPATIENT
Start: 2024-02-19 | End: 2024-02-18

## 2024-02-18 RX ORDER — PREDNISONE 50 MG/1
50 TABLET ORAL DAILY
Status: DISCONTINUED | OUTPATIENT
Start: 2024-02-19 | End: 2024-02-19

## 2024-02-18 RX ORDER — TORSEMIDE 100 MG/1
100 TABLET ORAL DAILY
Status: DISCONTINUED | OUTPATIENT
Start: 2024-02-19 | End: 2024-02-19

## 2024-02-18 RX ORDER — PANTOPRAZOLE SODIUM 40 MG/1
40 TABLET, DELAYED RELEASE ORAL DAILY
Status: CANCELLED | OUTPATIENT
Start: 2024-02-19

## 2024-02-18 RX ORDER — OXYCODONE HYDROCHLORIDE 5 MG/1
5 TABLET ORAL EVERY 6 HOURS PRN
Status: CANCELLED | OUTPATIENT
Start: 2024-02-18

## 2024-02-18 RX ORDER — METOPROLOL SUCCINATE 50 MG/1
50 TABLET, EXTENDED RELEASE ORAL DAILY
Status: DISCONTINUED | OUTPATIENT
Start: 2024-02-19 | End: 2024-02-20 | Stop reason: HOSPADM

## 2024-02-18 RX ORDER — TORSEMIDE 100 MG/1
100 TABLET ORAL DAILY
Status: CANCELLED | OUTPATIENT
Start: 2024-02-19

## 2024-02-18 RX ORDER — METOPROLOL SUCCINATE 50 MG/1
50 TABLET, EXTENDED RELEASE ORAL DAILY
Status: CANCELLED | OUTPATIENT
Start: 2024-02-19

## 2024-02-18 RX ORDER — METOLAZONE 5 MG/1
5 TABLET ORAL ONCE
Status: DISCONTINUED | OUTPATIENT
Start: 2024-02-18 | End: 2024-02-19

## 2024-02-18 RX ADMIN — ONDANSETRON 4 MG: 2 INJECTION INTRAMUSCULAR; INTRAVENOUS at 13:49

## 2024-02-18 RX ADMIN — ASPIRIN 81 MG 162 MG: 81 TABLET ORAL at 16:42

## 2024-02-18 RX ADMIN — Medication 7.5 MG: at 21:49

## 2024-02-18 RX ADMIN — MORPHINE SULFATE 4 MG: 4 INJECTION INTRAVENOUS at 13:49

## 2024-02-18 RX ADMIN — OXYCODONE HYDROCHLORIDE 5 MG: 5 TABLET ORAL at 15:48
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
## 2024-02-19 LAB
ANION GAP SERPL CALCULATED.3IONS-SCNC: 7 MMOL/L
BASOPHILS # BLD AUTO: 0.02 THOUSANDS/ÂΜL (ref 0–0.1)
BASOPHILS NFR BLD AUTO: 0 % (ref 0–1)
BUN SERPL-MCNC: 17 MG/DL (ref 5–25)
CALCIUM SERPL-MCNC: 8.9 MG/DL (ref 8.4–10.2)
CARDIAC TROPONIN I PNL SERPL HS: 56 NG/L (ref 8–18)
CHLORIDE SERPL-SCNC: 100 MMOL/L (ref 96–108)
CO2 SERPL-SCNC: 29 MMOL/L (ref 21–32)
CREAT SERPL-MCNC: 1.77 MG/DL (ref 0.6–1.3)
EOSINOPHIL # BLD AUTO: 0.09 THOUSAND/ÂΜL (ref 0–0.61)
EOSINOPHIL NFR BLD AUTO: 2 % (ref 0–6)
ERYTHROCYTE [DISTWIDTH] IN BLOOD BY AUTOMATED COUNT: 14.6 % (ref 11.6–15.1)
GFR SERPL CREATININE-BSD FRML MDRD: 31 ML/MIN/1.73SQ M
GLUCOSE P FAST SERPL-MCNC: 94 MG/DL (ref 65–99)
GLUCOSE SERPL-MCNC: 94 MG/DL (ref 65–140)
HCT VFR BLD AUTO: 35.2 % (ref 34.8–46.1)
HGB BLD-MCNC: 11.2 G/DL (ref 11.5–15.4)
IMM GRANULOCYTES # BLD AUTO: 0.01 THOUSAND/UL (ref 0–0.2)
IMM GRANULOCYTES NFR BLD AUTO: 0 % (ref 0–2)
LYMPHOCYTES # BLD AUTO: 1.18 THOUSANDS/ÂΜL (ref 0.6–4.47)
LYMPHOCYTES NFR BLD AUTO: 25 % (ref 14–44)
MCH RBC QN AUTO: 27.3 PG (ref 26.8–34.3)
MCHC RBC AUTO-ENTMCNC: 31.8 G/DL (ref 31.4–37.4)
MCV RBC AUTO: 86 FL (ref 82–98)
MONOCYTES # BLD AUTO: 0.37 THOUSAND/ÂΜL (ref 0.17–1.22)
MONOCYTES NFR BLD AUTO: 8 % (ref 4–12)
NEUTROPHILS # BLD AUTO: 3.04 THOUSANDS/ÂΜL (ref 1.85–7.62)
NEUTS SEG NFR BLD AUTO: 65 % (ref 43–75)
NRBC BLD AUTO-RTO: 0 /100 WBCS
PLATELET # BLD AUTO: 140 THOUSANDS/UL (ref 149–390)
PMV BLD AUTO: 11.6 FL (ref 8.9–12.7)
POTASSIUM SERPL-SCNC: 3.7 MMOL/L (ref 3.5–5.3)
RBC # BLD AUTO: 4.11 MILLION/UL (ref 3.81–5.12)
SODIUM SERPL-SCNC: 136 MMOL/L (ref 135–147)
WBC # BLD AUTO: 4.71 THOUSAND/UL (ref 4.31–10.16)

## 2024-02-19 PROCEDURE — 99214 OFFICE O/P EST MOD 30 MIN: CPT | Performed by: INTERNAL MEDICINE

## 2024-02-19 PROCEDURE — 80048 BASIC METABOLIC PNL TOTAL CA: CPT

## 2024-02-19 PROCEDURE — 84484 ASSAY OF TROPONIN QUANT: CPT | Performed by: STUDENT IN AN ORGANIZED HEALTH CARE EDUCATION/TRAINING PROGRAM

## 2024-02-19 PROCEDURE — 99232 SBSQ HOSP IP/OBS MODERATE 35: CPT | Performed by: INTERNAL MEDICINE

## 2024-02-19 PROCEDURE — 85025 COMPLETE CBC W/AUTO DIFF WBC: CPT | Performed by: INTERNAL MEDICINE

## 2024-02-19 PROCEDURE — 99222 1ST HOSP IP/OBS MODERATE 55: CPT | Performed by: INTERNAL MEDICINE

## 2024-02-19 RX ORDER — POLYETHYLENE GLYCOL 3350 17 G/17G
17 POWDER, FOR SOLUTION ORAL DAILY
Status: DISCONTINUED | OUTPATIENT
Start: 2024-02-19 | End: 2024-02-20 | Stop reason: HOSPADM

## 2024-02-19 RX ORDER — POTASSIUM CHLORIDE 20 MEQ/1
40 TABLET, EXTENDED RELEASE ORAL ONCE
Status: COMPLETED | OUTPATIENT
Start: 2024-02-19 | End: 2024-02-19

## 2024-02-19 RX ORDER — BUMETANIDE 0.25 MG/ML
2 INJECTION INTRAMUSCULAR; INTRAVENOUS 2 TIMES DAILY
Status: DISCONTINUED | OUTPATIENT
Start: 2024-02-19 | End: 2024-02-20

## 2024-02-19 RX ADMIN — HYDROMORPHONE HYDROCHLORIDE 0.5 MG: 1 INJECTION, SOLUTION INTRAMUSCULAR; INTRAVENOUS; SUBCUTANEOUS at 08:41

## 2024-02-19 RX ADMIN — POLYETHYLENE GLYCOL 3350 17 G: 17 POWDER, FOR SOLUTION ORAL at 14:36

## 2024-02-19 RX ADMIN — METOPROLOL SUCCINATE 50 MG: 50 TABLET, EXTENDED RELEASE ORAL at 08:40

## 2024-02-19 RX ADMIN — NYSTATIN: 100000 POWDER TOPICAL at 08:42

## 2024-02-19 RX ADMIN — NICOTINE 1 PATCH: 7 PATCH, EXTENDED RELEASE TRANSDERMAL at 08:41

## 2024-02-19 RX ADMIN — TORSEMIDE 100 MG: 100 TABLET ORAL at 08:41

## 2024-02-19 RX ADMIN — BUMETANIDE 2 MG: 0.25 INJECTION INTRAMUSCULAR; INTRAVENOUS at 13:46

## 2024-02-19 RX ADMIN — PANTOPRAZOLE SODIUM 40 MG: 40 TABLET, DELAYED RELEASE ORAL at 08:40

## 2024-02-19 RX ADMIN — Medication 7.5 MG: at 04:51

## 2024-02-19 RX ADMIN — POTASSIUM CHLORIDE 40 MEQ: 1500 TABLET, EXTENDED RELEASE ORAL at 11:50

## 2024-02-19 RX ADMIN — HYDROMORPHONE HYDROCHLORIDE 0.5 MG: 1 INJECTION, SOLUTION INTRAMUSCULAR; INTRAVENOUS; SUBCUTANEOUS at 14:35

## 2024-02-19 RX ADMIN — Medication 7.5 MG: at 16:37

## 2024-02-19 RX ADMIN — BUMETANIDE 2 MG: 0.25 INJECTION INTRAMUSCULAR; INTRAVENOUS at 19:01

## 2024-02-19 RX ADMIN — PREDNISONE 50 MG: 50 TABLET ORAL at 11:50

## 2024-02-19 RX ADMIN — HYDROMORPHONE HYDROCHLORIDE 0.5 MG: 1 INJECTION, SOLUTION INTRAMUSCULAR; INTRAVENOUS; SUBCUTANEOUS at 19:11

## 2024-02-19 RX ADMIN — FERROUS GLUCONATE 324 MG: 324 TABLET ORAL at 08:42

## 2024-02-19 RX ADMIN — Medication 7.5 MG: at 12:21

## 2024-02-19 NOTE — ASSESSMENT & PLAN NOTE
Patient states she feels a left sided chest pressure that is how her CHF feels during flare ups. It is constant and occurs even at rest. It does not radiate. This episode started this morning.   EKG unchanged on admission from last EKG on 2/9/24.  Last ECHO was 1/6/24, no need to repeat at this time  Last stress test 10/23 normal  Plan:  - Telemetry ordered  -Cardio consulted to rule out ACS and acute on chronic CHF, appreciate any recs

## 2024-02-19 NOTE — ASSESSMENT & PLAN NOTE
Patient's home regimen is Torsemide 100 daily, doxazosin 2 mg daily, metoprolol 50 daily. Continue home meds
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
## 2024-02-19 NOTE — PROGRESS NOTES
[facility]  Progress Note  Name: Lupe Menjivar I  MRN: [id number]  Unit/Bed#: S -01 I Date of Admission: 2/18/2024   Date of Service: 2/19/2024 I Hospital Day: 0    Assessment/Plan   HTN (hypertension)  Assessment & Plan  Patient's home regimen is Torsemide 100 daily, doxazosin 2 mg daily, metoprolol 50 daily. Continue home meds    * Chest pain  Assessment & Plan  Patient states she feels a left sided chest pressure that is how her CHF feels during flare ups. It is constant and occurs even at rest. It does not radiate. This episode started this morning.   EKG unchanged on admission from last EKG on 2/9/24.  Last ECHO was 1/6/24, no need to repeat at this time  Last stress test 10/23 normal  Plan:  - Telemetry ordered  -Cardio consulted to rule out ACS and acute on chronic CHF, appreciate any recs    CHF, acute on chronic (HCC)  Assessment & Plan  Wt Readings from Last 3 Encounters:   02/19/24 100 kg (221 lb)   02/09/24 99.1 kg (218 lb 6.4 oz)   02/05/24 97.1 kg (214 lb 1.1 oz)   Patient seems to have multiple past hospitalizations involving chest pain and acute on chronic CHF flare ups. Home regimen is Torsemide 100 mg daily, metolazone 5 mg as needed for weight gain from CHF.   Patient does have slight ankle edema compared to her usual baseline according to her. No JVD or hepatojugular reflex. No rales on exam. Does not seem to be acute flare up of CHF at this time, and her home meds seem to be managing it appropriately at this time, but will continue to monitor.  Plan:  -gave patient dose of metolazone 5 mg night of admission, 2/18.   - Daily weights  -Is and Os  - Cardio assesses patient is slightly volume overloaded on 2/19 their recs are to hold home torsemide and gave her 2 doses of IV Bumex 2 mg today. Holding her home doxazosin. Stopped telemetry.      Abdominal pain  Assessment & Plan  Patient has left sided abdominal pain that started yesterday. It is LLQ only and occurs  when she feels she has to defecate and during defecation. She also noted bright red blood per rectum for the first time ever. She was treated with Cefpodoxime around 1/7/24. At that time she did have signs of infection like fever and chills whereas this time she does not.   CT C/A/P on admission: 3 mm non obstructing right renal calculus.   UA and urine microscopic also shows blood in urine.   Patient's left sided abdominal pain seems unrelated to this renal stone finding. Unclear if blood in urine really came from rectum versus urine.   Plan:  - Pain regimen 0.5 dilaudid for breakthrough. Oxy 5 and 7.5 for moderate and severe pain respectively  - GI consulted, recommend colonoscopy outpatient as scheduled and gave her miralax. They stopped her 50 mg of prednisone as this was a short course from 11/23 for plantar fasciitis and should not be on her med list.   - Keep checking AM CBC for hemoglobin stability    Stage 4 chronic kidney disease (HCC)  Assessment & Plan  Lab Results   Component Value Date    EGFR 31 02/19/2024    EGFR 35 02/18/2024    EGFR 28 02/05/2024    CREATININE 1.77 (H) 02/19/2024    CREATININE 1.58 (H) 02/18/2024    CREATININE 1.92 (H) 02/05/2024   Patient's baseline appears to be around 1.5-1.8. Within this limit at this time, no DANIELA. Will continue to monitor with AM labs.     History of monomorphic ventricular tachycardia, s/p ICD in 2016  Assessment & Plan  Patient has history of paroxsymal atrial fibrillation and Non-STEMI in 2018. Patient had ICD placed in 2016.   Patient states she has never been on aspirin or Plavix after hear heart attack  Plan:  -cardio consult with ICD interrogation ordered    BRBPR (bright red blood per rectum)  Assessment & Plan  Patient states she has a hemorrhoid which she has had for years. She has never had bleeding episodes like this before. She admits to some rectal pain, no itching.   CT C/A/P on admission negative for signs diverticulitis  Hemoglobin stable on  admission at 13.4.  Plan:  -most likely source of bleeding could be from the hemorrhoid  -GI consulted  - Holding patient's home Xarelto until the source of bleeding is discovered and non-active  - Will watch AM CBC for hemoglobin  - Consider trending H&Hs if necessary    Right renal stone  Assessment & Plan  CT C/A/P on admission shows 3 mm non obstructing right renal stone. Patient states she has never had a kidney stone before. However, this is the opposite side of her abdominal pain which is left sided, so unlikely source of her pain.      Pain:  - continue to monitor  -Pain regimen in place  - stone likely to pass on its own due to small enough size    Paroxysmal A-fib (HCC)  Assessment & Plan  Patient has history of a-fib, on Xarelto at home.    Plan:  - holding Xarelto for now due to bright red blood per rectum. Can continue if bleeding no longer occurs and seems safe    Elevated troponin  Assessment & Plan  Troponins in the ED were 45. 49. 53. No need to continue trending as they are relatively unremarkable. Cardio will also see patient and can determine if further troponins are needed.   Random AM troponin on  56    On prednisone therapy  Assessment & Plan  Patient states she has been taking prednisone 50 mg for about a month. Although an unclear history due to poor historian, she was prescribed it by her outpatient GI after an attempt at a colonoscopy in January. She will be following up with them soon in order to get another one as the first attempt was insufficient.     Plan:  - For now continue this home prednisone 50 mg daily, especially until we know what is causing her GI problems during this stay    Fungal skin disease  Assessment & Plan  Patient states that she sometimes gets fungal infections under her abdomen, around her  scar. Uses Nystatin powder for this at home, will continue.     Gastroesophageal reflux disease   Assessment & Plan  Patient takes Protonix at home, will  continue.           VTE Pharmacologic Prophylaxis: VTE Score: 9 High Risk (Score >/= 5) - Pharmacological DVT Prophylaxis Contraindicated. Sequential Compression Devices Ordered.    Mobility:   Basic Mobility Inpatient Raw Score: 23  JH-HLM Goal: 7: Walk 25 feet or more  HLM Goal NOT achieved. Continue with multidisciplinary rounding and encourage appropriate mobility to improve upon HLM goals.    Patient Centered Rounds: I performed bedside rounds with nursing staff today.  Discussions with Specialists or Other Care Team Provider: Attending, nursing, case management     Education and Discussions with Family / Patient: Patient declined call to .     Current Length of Stay: 0 day(s)  Current Patient Status: Observation   Discharge Plan: Anticipate discharge in 24-48 hrs to home.    Code Status: Level 1 - Full Code    Subjective:   Patient seen at bedside. She states she wants to leave because they won't let her order what she wants. I explained to her she is on the cardiac diet for now. When cardiology sees her, and if everything is okay, we can consider switching it back.     Objective:     Vitals:   Temp (24hrs), Av.1 °F (36.7 °C), Min:97.9 °F (36.6 °C), Max:98.3 °F (36.8 °C)    Temp:  [97.9 °F (36.6 °C)-98.3 °F (36.8 °C)] 98.3 °F (36.8 °C)  HR:  [60-75] 60  Resp:  [16-18] 18  BP: ()/(57-72) 124/71  SpO2:  [92 %-98 %] 93 %  Body mass index is 34.1 kg/m².     Input and Output Summary (last 24 hours):     Intake/Output Summary (Last 24 hours) at 2024 0457  Last data filed at 2024 1300  Gross per 24 hour   Intake 720 ml   Output --   Net 720 ml       Physical Exam:   Physical Exam  Constitutional:       Appearance: Normal appearance.      Comments: Patient is agitated at the girl taking her order for breakfast as she is on the cardiac diet and cannot order what she wanted. States she wants to leave   HENT:      Head: Normocephalic.      Mouth/Throat:      Mouth: Mucous membranes are  moist.      Pharynx: Oropharynx is clear.   Neck:      Comments: No jvd  Cardiovascular:      Rate and Rhythm: Normal rate and regular rhythm.      Pulses: Normal pulses.      Heart sounds: Normal heart sounds.      Comments: Telemetry is sinus rhythm   Pulmonary:      Effort: Pulmonary effort is normal.      Breath sounds: Normal breath sounds.   Abdominal:      General: Abdomen is flat. Bowel sounds are normal.      Palpations: Abdomen is soft.      Tenderness: There is abdominal tenderness.      Comments: Tenderness to palpation of LLQ   Musculoskeletal:      Comments: Ankle edema much improved from yesterday   Skin:     General: Skin is warm and dry.      Capillary Refill: Capillary refill takes less than 2 seconds.   Neurological:      General: No focal deficit present.      Mental Status: She is alert and oriented to person, place, and time.   Psychiatric:         Mood and Affect: Mood normal.         Behavior: Behavior normal.         Additional Data:     Labs:  Results from last 7 days   Lab Units 02/19/24  0459   WBC Thousand/uL 4.71   HEMOGLOBIN g/dL 11.2*   HEMATOCRIT % 35.2   PLATELETS Thousands/uL 140*   NEUTROS PCT % 65   LYMPHS PCT % 25   MONOS PCT % 8   EOS PCT % 2     Results from last 7 days   Lab Units 02/19/24  0459 02/18/24  1208   SODIUM mmol/L 136 136   POTASSIUM mmol/L 3.7 4.0   CHLORIDE mmol/L 100 99   CO2 mmol/L 29 30   BUN mg/dL 17 15   CREATININE mg/dL 1.77* 1.58*   ANION GAP mmol/L 7 7   CALCIUM mg/dL 8.9 9.8   ALBUMIN g/dL  --  4.0   TOTAL BILIRUBIN mg/dL  --  0.50   ALK PHOS U/L  --  77   ALT U/L  --  5*   AST U/L  --  16   GLUCOSE RANDOM mg/dL 94 79                       Lines/Drains:  Invasive Devices       Peripheral Intravenous Line  Duration             Peripheral IV 02/18/24 Left;Ventral (anterior) Forearm 1 day                      Telemetry:  Telemetry Orders (From admission, onward)               24 Hour Telemetry Monitoring  Continuous x 24 Hours (Telem)        Question:   Reason for 24 Hour Telemetry  Answer:  Arrhythmias requiring acute medical intervention / PPM or ICD malfunction                     Telemetry Reviewed: Normal Sinus Rhythm  Indication for Continued Telemetry Use: Acute MI/Unstable Angina/Rule out ACS             Imaging: Reviewed radiology reports from this admission including: chest xray and abdominal/pelvic CT and Personally reviewed the following imaging: chest xray and abdominal/pelvic CT    Recent Cultures (last 7 days):         Last 24 Hours Medication List:   Current Facility-Administered Medications   Medication Dose Route Frequency Provider Last Rate    bumetanide  2 mg Intravenous BID CHARLOTTE Reyes      ferrous gluconate  324 mg Oral Once per day on Monday Wednesday Friday Jose Lopez MD      HYDROmorphone  0.5 mg Intravenous Q2H PRN Nenita Fofana DO      metoprolol succinate  50 mg Oral Daily Nenita Fofana,       nicotine  1 patch Transdermal Daily Nenita Fofana,       nystatin   Topical Daily Nenita Fofana DO      oxyCODONE  5 mg Oral Q4H PRN Nenita Fofana DO      Or    oxyCODONE  7.5 mg Oral Q4H PRN Nenita Fofana, DO      pantoprazole  40 mg Oral Daily Nenita Fofana DO      polyethylene glycol  17 g Oral Daily Vlad Garrido MD      torsemide  100 mg Oral Daily Nenita Fofana DO          Today, Patient Was Seen By: Nenita Fofana DO    **Please Note: This note may have been constructed using a voice recognition system.**

## 2024-02-19 NOTE — ASSESSMENT & PLAN NOTE
Patient states she has a hemorrhoid which she has had for years. She has never had bleeding episodes like this before. She admits to some rectal pain, no itching.   CT C/A/P on admission negative for signs diverticulitis  Hemoglobin stable on admission at 13.4.  Plan:  -most likely source of bleeding could be from the hemorrhoid  -GI consulted  - Holding patient's home Xarelto until the source of bleeding is discovered and non-active  - Will watch AM CBC for hemoglobin  - Consider trending H&Hs if necessary
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
## 2024-02-19 NOTE — ASSESSMENT & PLAN NOTE
Patient has left sided abdominal pain that started yesterday. It is LLQ only and occurs when she feels she has to defecate and during defecation. She also noted bright red blood per rectum for the first time ever. She was treated with Cefpodoxime around 1/7/24. At that time she did have signs of infection like fever and chills whereas this time she does not.   CT C/A/P on admission: 3 mm non obstructing right renal calculus.   UA and urine microscopic also shows blood in urine.   Patient's left sided abdominal pain seems unrelated to this renal stone finding. Unclear if blood in urine really came from rectum versus urine.   Plan:  - Pain regimen 0.5 dilaudid for breakthrough. Oxy 5 and 7.5 for moderate and severe pain respectively  - GI consulted, recommend colonoscopy outpatient as scheduled and gave her miralax. They stopped her 50 mg of prednisone as this was a short course from 11/23 for plantar fasciitis and should not be on her med list.   - Keep checking AM CBC for hemoglobin stability

## 2024-02-19 NOTE — ASSESSMENT & PLAN NOTE
Wt Readings from Last 3 Encounters:   02/19/24 100 kg (221 lb)   02/09/24 99.1 kg (218 lb 6.4 oz)   02/05/24 97.1 kg (214 lb 1.1 oz)   Patient seems to have multiple past hospitalizations involving chest pain and acute on chronic CHF flare ups. Home regimen is Torsemide 100 mg daily, metolazone 5 mg as needed for weight gain from CHF.   Patient does have slight ankle edema compared to her usual baseline according to her. No JVD or hepatojugular reflex. No rales on exam. Does not seem to be acute flare up of CHF at this time, and her home meds seem to be managing it appropriately at this time, but will continue to monitor.  Plan:  -gave patient dose of metolazone 5 mg night of admission, 2/18.   - Daily weights  -Is and Os  - Cardio assesses patient is slightly volume overloaded on 2/19 their recs are to hold home torsemide and gave her 2 doses of IV Bumex 2 mg today. Holding her home doxazosin. Stopped telemetry.

## 2024-02-19 NOTE — ASSESSMENT & PLAN NOTE
Wt Readings from Last 3 Encounters:   02/18/24 99.8 kg (220 lb)   02/09/24 99.1 kg (218 lb 6.4 oz)   02/05/24 97.1 kg (214 lb 1.1 oz)   Patient seems to have multiple past hospitalizations involving chest pain and acute on chronic CHF flare ups. Home regimen is Torsemide 100 mg daily, metolazone 5 mg as needed for weight gain from CHF.   Patient does have slight ankle edema compared to her usual baseline according to her. No JVD or hepatojugular reflex. No rales on exam. Does not seem to be acute flare up of CHF at this time, and her home meds seem to be managing it appropriately at this time, but will continue to monitor.  Plan:  - Continue torsemide 100 mg daily  -gave patient dose of metolazone 5 mg night of admission, 2/18.   - Daily weights  -Is and Os

## 2024-02-19 NOTE — PLAN OF CARE
Problem: PAIN - ADULT  Goal: Verbalizes/displays adequate comfort level or baseline comfort level  Description: Interventions:  - Encourage patient to monitor pain and request assistance  - Assess pain using appropriate pain scale  - Administer analgesics based on type and severity of pain and evaluate response  - Implement non-pharmacological measures as appropriate and evaluate response  - Consider cultural and social influences on pain and pain management  - Notify physician/advanced practitioner if interventions unsuccessful or patient reports new pain  Outcome: Progressing     Problem: INFECTION - ADULT  Goal: Absence or prevention of progression during hospitalization  Description: INTERVENTIONS:  - Assess and monitor for signs and symptoms of infection  - Monitor lab/diagnostic results  - Monitor all insertion sites, i.e. indwelling lines, tubes, and drains  - Monitor endotracheal if appropriate and nasal secretions for changes in amount and color  - [place] appropriate cooling/warming therapies per order  - Administer medications as ordered  - Instruct and encourage patient and family to use good hand hygiene technique  - Identify and instruct in appropriate isolation precautions for identified infection/condition  Outcome: Progressing  Goal: Absence of fever/infection during neutropenic period  Description: INTERVENTIONS:  - Monitor WBC    Outcome: Progressing     Problem: SAFETY ADULT  Goal: Patient will remain free of falls  Description: INTERVENTIONS:  - Educate patient/family on patient safety including physical limitations  - Instruct patient to call for assistance with activity   - Consult OT/PT to assist with strengthening/mobility   - Keep Call bell within reach  - Keep bed low and locked with side rails adjusted as appropriate  - Keep care items and personal belongings within reach  - Initiate and maintain comfort rounds  - Make Fall Risk Sign visible to staff  - Offer Toileting every 2 Hours,  in advance of need  - Initiate/Maintain bed alarm  - Obtain necessary fall risk management equipment  - Apply yellow socks and bracelet for high fall risk patients  - Consider moving patient to room near nurses station  Outcome: Progressing  Goal: Maintain or return to baseline ADL function  Description: INTERVENTIONS:  -  Assess patient's ability to carry out ADLs; assess patient's baseline for ADL function and identify physical deficits which impact ability to perform ADLs (bathing, care of mouth/teeth, toileting, grooming, dressing, etc.)  - Assess/evaluate cause of self-care deficits   - Assess range of motion  - Assess patient's mobility; develop plan if impaired  - Assess patient's need for assistive devices and provide as appropriate  - Encourage maximum independence but intervene and supervise when necessary  - Involve family in performance of ADLs  - Assess for home care needs following discharge   - Consider OT consult to assist with ADL evaluation and planning for discharge  - Provide patient education as appropriate  Outcome: Progressing  Goal: Maintains/Returns to pre admission functional level  Description: INTERVENTIONS:  - Perform AM-PAC 6 Click Basic Mobility/ Daily Activity assessment daily.  - Set and communicate daily mobility goal to care team and patient/family/caregiver.   - Collaborate with rehabilitation services on mobility goals if consulted  - Out of bed to chair 2 times a day   - Out of bed for meals 2 times a day  - Out of bed for toileting  - Record patient progress and toleration of activity level   Outcome: Progressing     Problem: DISCHARGE PLANNING  Goal: Discharge to home or other facility with appropriate resources  Description: INTERVENTIONS:  - Identify barriers to discharge w/patient and caregiver  - Arrange for needed discharge resources and transportation as appropriate  - Identify discharge learning needs (meds, wound care, etc.)  - Arrange for interpretive services to  assist at discharge as needed  - Refer to Case Management Department for coordinating discharge planning if the patient needs post-hospital services based on physician/advanced practitioner order or complex needs related to functional status, cognitive ability, or social support system  Outcome: Progressing     Problem: Knowledge Deficit  Goal: Patient/family/caregiver demonstrates understanding of disease process, treatment plan, medications, and discharge instructions  Description: Complete learning assessment and assess knowledge base.  Interventions:  - Provide teaching at level of understanding  - Provide teaching via preferred learning methods  Outcome: Progressing     Problem: GASTROINTESTINAL - ADULT  Goal: Minimal or absence of nausea and/or vomiting  Description: INTERVENTIONS:  - Administer IV fluids if ordered to ensure adequate hydration  - Maintain NPO status until nausea and vomiting are resolved  - Nasogastric tube if ordered  - Administer ordered antiemetic medications as needed  - Provide nonpharmacologic comfort measures as appropriate  - Advance diet as tolerated, if ordered  - Consider nutrition services referral to assist patient with adequate nutrition and appropriate food choices  Outcome: Progressing  Goal: Maintains or returns to baseline bowel function  Description: INTERVENTIONS:  - Assess bowel function  - Encourage oral fluids to ensure adequate hydration  - Administer IV fluids if ordered to ensure adequate hydration  - Administer ordered medications as needed  - Encourage mobilization and activity  - Consider nutritional services referral to assist patient with adequate nutrition and appropriate food choices  Outcome: Progressing  Goal: Maintains adequate nutritional intake  Description: INTERVENTIONS:  - Monitor percentage of each meal consumed  - Identify factors contributing to decreased intake, treat as appropriate  - Assist with meals as needed  - Monitor I&O, weight, and lab  values if indicated  - Obtain nutrition services referral as needed  Outcome: Progressing  Goal: Establish and maintain optimal ostomy function  Description: INTERVENTIONS:  - Assess bowel function  - Encourage oral fluids to ensure adequate hydration  - Administer IV fluids if ordered to ensure adequate hydration   - Administer ordered medications as needed  - Encourage mobilization and activity  - Nutrition services referral to assist patient with appropriate food choices  - Assess stoma site  - Consider wound care consult   Outcome: Progressing  Goal: Oral mucous membranes remain intact  Description: INTERVENTIONS  - Assess oral mucosa and hygiene practices  - Implement preventative oral hygiene regimen  - Implement oral medicated treatments as ordered  - Initiate Nutrition services referral as needed  Outcome: Progressing     Problem: CARDIOVASCULAR - ADULT  Goal: Maintains optimal cardiac output and hemodynamic stability  Description: INTERVENTIONS:  - Monitor I/O, vital signs and rhythm  - Monitor for S/S and trends of decreased cardiac output  - Administer and titrate ordered vasoactive medications to optimize hemodynamic stability  - Assess quality of pulses, skin color and temperature  - Assess for signs of decreased coronary artery perfusion  - Instruct patient to report change in severity of symptoms  Outcome: Progressing  Goal: Absence of cardiac dysrhythmias or at baseline rhythm  Description: INTERVENTIONS:  - Continuous cardiac monitoring, vital signs, obtain 12 lead EKG if ordered  - Administer antiarrhythmic and heart rate control medications as ordered  - Monitor electrolytes and administer replacement therapy as ordered  Outcome: Progressing

## 2024-02-19 NOTE — ASSESSMENT & PLAN NOTE
Lab Results   Component Value Date    EGFR 31 02/19/2024    EGFR 35 02/18/2024    EGFR 28 02/05/2024    CREATININE 1.77 (H) 02/19/2024    CREATININE 1.58 (H) 02/18/2024    CREATININE 1.92 (H) 02/05/2024   Patient's baseline appears to be around 1.5-1.8. Within this limit at this time, no DANIELA. Will continue to monitor with AM labs.

## 2024-02-19 NOTE — PLAN OF CARE
Problem: PAIN - ADULT  Goal: Verbalizes/displays adequate comfort level or baseline comfort level  Description: Interventions:  - Encourage patient to monitor pain and request assistance  - Assess pain using appropriate pain scale  - Administer analgesics based on type and severity of pain and evaluate response  - Implement non-pharmacological measures as appropriate and evaluate response  - Consider cultural and social influences on pain and pain management  - Notify physician/advanced practitioner if interventions unsuccessful or patient reports new pain  Outcome: Progressing     Problem: INFECTION - ADULT  Goal: Absence or prevention of progression during hospitalization  Description: INTERVENTIONS:  - Assess and monitor for signs and symptoms of infection  - Monitor lab/diagnostic results  - Monitor all insertion sites, i.e. indwelling lines, tubes, and drains  - Monitor endotracheal if appropriate and nasal secretions for changes in amount and color  - [place] appropriate cooling/warming therapies per order  - Administer medications as ordered  - Instruct and encourage patient and family to use good hand hygiene technique  - Identify and instruct in appropriate isolation precautions for identified infection/condition  Outcome: Progressing  Goal: Absence of fever/infection during neutropenic period  Description: INTERVENTIONS:  - Monitor WBC    Outcome: Progressing     Problem: SAFETY ADULT  Goal: Patient will remain free of falls  Description: INTERVENTIONS:  - Educate patient/family on patient safety including physical limitations  - Instruct patient to call for assistance with activity   - Consult OT/PT to assist with strengthening/mobility   - Keep Call bell within reach  - Keep bed low and locked with side rails adjusted as appropriate  - Keep care items and personal belongings within reach  - Initiate and maintain comfort rounds  - Make Fall Risk Sign visible to staff  - Offer Toileting every 2 Hours,  in advance of need  - Initiate/Maintain bed/chair alarm  - Obtain necessary fall risk management equipment  - Apply yellow socks and bracelet for high fall risk patients  - Consider moving patient to room near nurses station  Outcome: Progressing  Goal: Maintain or return to baseline ADL function  Description: INTERVENTIONS:  -  Assess patient's ability to carry out ADLs; assess patient's baseline for ADL function and identify physical deficits which impact ability to perform ADLs (bathing, care of mouth/teeth, toileting, grooming, dressing, etc.)  - Assess/evaluate cause of self-care deficits   - Assess range of motion  - Assess patient's mobility; develop plan if impaired  - Assess patient's need for assistive devices and provide as appropriate  - Encourage maximum independence but intervene and supervise when necessary  - Involve family in performance of ADLs  - Assess for home care needs following discharge   - Consider OT consult to assist with ADL evaluation and planning for discharge  - Provide patient education as appropriate  Outcome: Progressing  Goal: Maintains/Returns to pre admission functional level  Description: INTERVENTIONS:  - Perform AM-PAC 6 Click Basic Mobility/ Daily Activity assessment daily.  - Set and communicate daily mobility goal to care team and patient/family/caregiver.   - Collaborate with rehabilitation services on mobility goals if consulted  - Perform Range of Motion   - Reposition patient   - Dangle patient  - Stand patient   - Ambulate patient   - Out of bed to chair    - Out of bed for meals   - Out of bed for toileting  - Record patient progress and toleration of activity level   Outcome: Progressing     Problem: DISCHARGE PLANNING  Goal: Discharge to home or other facility with appropriate resources  Description: INTERVENTIONS:  - Identify barriers to discharge w/patient and caregiver  - Arrange for needed discharge resources and transportation as appropriate  - Identify  discharge learning needs (meds, wound care, etc.)  - Arrange for interpretive services to assist at discharge as needed  - Refer to Case Management Department for coordinating discharge planning if the patient needs post-hospital services based on physician/advanced practitioner order or complex needs related to functional status, cognitive ability, or social support system  Outcome: Progressing     Problem: Knowledge Deficit  Goal: Patient/family/caregiver demonstrates understanding of disease process, treatment plan, medications, and discharge instructions  Description: Complete learning assessment and assess knowledge base.  Interventions:  - Provide teaching at level of understanding  - Provide teaching via preferred learning methods  Outcome: Progressing     Problem: GASTROINTESTINAL - ADULT  Goal: Minimal or absence of nausea and/or vomiting  Description: INTERVENTIONS:  - Administer IV fluids if ordered to ensure adequate hydration  - Maintain NPO status until nausea and vomiting are resolved  - Nasogastric tube if ordered  - Administer ordered antiemetic medications as needed  - Provide nonpharmacologic comfort measures as appropriate  - Advance diet as tolerated, if ordered  - Consider nutrition services referral to assist patient with adequate nutrition and appropriate food choices  Outcome: Progressing  Goal: Maintains or returns to baseline bowel function  Description: INTERVENTIONS:  - Assess bowel function  - Encourage oral fluids to ensure adequate hydration  - Administer IV fluids if ordered to ensure adequate hydration  - Administer ordered medications as needed  - Encourage mobilization and activity  - Consider nutritional services referral to assist patient with adequate nutrition and appropriate food choices  Outcome: Progressing  Goal: Maintains adequate nutritional intake  Description: INTERVENTIONS:  - Monitor percentage of each meal consumed  - Identify factors contributing to decreased  intake, treat as appropriate  - Assist with meals as needed  - Monitor I&O, weight, and lab values if indicated  - Obtain nutrition services referral as needed  Outcome: Progressing  Goal: Establish and maintain optimal ostomy function  Description: INTERVENTIONS:  - Assess bowel function  - Encourage oral fluids to ensure adequate hydration  - Administer IV fluids if ordered to ensure adequate hydration   - Administer ordered medications as needed  - Encourage mobilization and activity  - Nutrition services referral to assist patient with appropriate food choices  - Assess stoma site  - Consider wound care consult   Outcome: Progressing  Goal: Oral mucous membranes remain intact  Description: INTERVENTIONS  - Assess oral mucosa and hygiene practices  - Implement preventative oral hygiene regimen  - Implement oral medicated treatments as ordered  - Initiate Nutrition services referral as needed  Outcome: Progressing     Problem: CARDIOVASCULAR - ADULT  Goal: Maintains optimal cardiac output and hemodynamic stability  Description: INTERVENTIONS:  - Monitor I/O, vital signs and rhythm  - Monitor for S/S and trends of decreased cardiac output  - Administer and titrate ordered vasoactive medications to optimize hemodynamic stability  - Assess quality of pulses, skin color and temperature  - Assess for signs of decreased coronary artery perfusion  - Instruct patient to report change in severity of symptoms  Outcome: Progressing  Goal: Absence of cardiac dysrhythmias or at baseline rhythm  Description: INTERVENTIONS:  - Continuous cardiac monitoring, vital signs, obtain 12 lead EKG if ordered  - Administer antiarrhythmic and heart rate control medications as ordered  - Monitor electrolytes and administer replacement therapy as ordered  Outcome: Progressing

## 2024-02-19 NOTE — ASSESSMENT & PLAN NOTE
Troponins in the ED were 45. 49. 53. No need to continue trending as they are relatively unremarkable. Cardio will also see patient and can determine if further troponins are needed.

## 2024-02-19 NOTE — UTILIZATION REVIEW
Initial Clinical Review    Admission: Date/Time/Statement:   Admission Orders (From admission, onward)       Ordered        02/18/24 1623  Place in Observation  Once                          Orders Placed This Encounter   Procedures    Place in Observation     Standing Status:   Standing     Number of Occurrences:   1     Order Specific Question:   Level of Care     Answer:   Med Surg [16]     ED Arrival Information       Expected   -    Arrival   2/18/2024 10:47    Acuity   Urgent              Means of arrival   Walk-In    Escorted by   Spouse    Service   Hospitalist    Admission type   Emergency              Arrival complaint   Chest Pains             Chief Complaint   Patient presents with    Chest Pain     Pt reports chest pain onset this morning, dizziness, lower left abd pain that radiates from back to front, hx diverticulitis, denies n/v/d         Initial Presentation: 58 y.o. female with a PMH of stage IV CKD, a-fib, MI (in 2018), ICD placement in 2016, CHF, Hep C, HTN, and diverticulitis who presents with chest pain and left abdominal pain.  For the chest pain, it is described as left sided pressure that is at rest and constant since this morning; it woke her up. She states this feels like her CHF flare ups. She states her ankles are swollen compared to baseline and they do this her CHF exacerbations. For the abdominal pain, she has it in the LLQ and she notices it when she has to defecate and during defecation. She also notes bright red blood per rectum which she has never had before. She mentions being treated with cefpodoxime last month. Plan: Observation for chest pain, chronic CHF, adominal pain, stage 4 CKD, hx of monomorphic ventricular tachycardia s/p ICD, BRBPR, right renal stone, Afib, elevated troponin, on prednisone therapy, GERD, HTN: telemetry, Cardiology consult, torsemide, metolazone, pain management, GI consult, CBC, ICD interrogation, hold Xarelto, trend troponin, continue prednisone,  Protonix, Torsemide, doxazosin, metoprolol.     2/19:    GI consult: trend H&H, recommend discontinuation of prednisone at this time, no indication for inpatient colonoscopy, pain management, start daily Miralax.     Cardiology consult: She appears at least mildly volume overloaded on exam with JVD, and abdominal bloating. Lung fields are clear, no supplemental O2 requirements. She is approximately 7 pounds above her dry weight. Recommend holding torsemide for now. Will give 2 doses of IV Bumex 2 mg today and assess her response to this. Continue holding Xarelto and doxazosin. Continue metoprolol. 2 gm Na diet with 1800 ml fluid restriction. Montor renal function and electrolytes.     Internal medicine: continue home metoprolol, hold torsemide and doxazosin and give IV Bumex x 2 doses per Cardiology, prednisone disoncintued per GI, monitor CBC, continue holding Xarelto, continue Protonix.     ED Triage Vitals   Temperature Pulse Respirations Blood Pressure SpO2   02/18/24 1109 02/18/24 1109 02/18/24 1109 02/18/24 1109 02/18/24 1109   97.5 °F (36.4 °C) 63 18 134/83 99 %      Temp Source Heart Rate Source Patient Position - Orthostatic VS BP Location FiO2 (%)   02/18/24 1109 02/18/24 1109 02/18/24 1345 02/18/24 1345 --   Oral Monitor Lying Right arm       Pain Score       02/18/24 1109       8          Wt Readings from Last 1 Encounters:   02/19/24 100 kg (221 lb)     Additional Vital Signs:     Date/Time Temp Pulse Resp BP MAP (mmHg) SpO2 O2 Device   02/19/24 07:45:24 98 °F (36.7 °C) 62 18 116/65 82 92 % None (Room air)   02/18/24 22:10:49 98.2 °F (36.8 °C) -- 18 101/57 72 -- --   02/18/24 2100 97.9 °F (36.6 °C) 74 16 92/58 -- -- --   02/18/24 1727 -- 74 16 115/72 85 98 % None (Room air)   02/18/24 1630 97.9 °F (36.6 °C) 75 16 116/70 -- 98 % None (Room air)   02/18/24 1531 -- 81 16 115/72 -- 98 % None (Room air)   02/18/24 1400 -- 78 16 112/73 89 97 % None (Room air)   02/18/24 1345 -- 61 16 131/71 93 98 % None (Room  air)   02/18/24 1315 -- 80 18 180/93 Abnormal  125 94 % --     Pertinent Labs/Diagnostic Test Results:   CT abdomen pelvis wo contrast   Final Result by Goran Guerra MD (02/18 1435)   1. 3 mm nonobstructing right renal calculus. No hydronephrosis. Left perinephric stranding is similar but otherwise limited in evaluation without contrast. Otherwise no acute intra-abdominal pathology. Incidental findings as above.            Workstation performed: [id number]         XR chest 1 view portable   ED Interpretation by Brien Gatica DO (02/18 1325)   No acute pathology, pacemaker device in left chest         2/19 EKG:  Ventricular-paced rhythm  Abnormal ECG         Results from last 7 days   Lab Units 02/19/24  0459 02/18/24  1208   WBC Thousand/uL 4.71 6.69   HEMOGLOBIN g/dL 11.2* 13.4   HEMATOCRIT % 35.2 42.5   PLATELETS Thousands/uL 140* 179   NEUTROS ABS Thousands/µL 3.04 4.75         Results from last 7 days   Lab Units 02/19/24  0459 02/18/24  1208   SODIUM mmol/L 136 136   POTASSIUM mmol/L 3.7 4.0   CHLORIDE mmol/L 100 99   CO2 mmol/L 29 30   ANION GAP mmol/L 7 7   BUN mg/dL 17 15   CREATININE mg/dL 1.77* 1.58*   EGFR ml/min/1.73sq m 31 35   CALCIUM mg/dL 8.9 9.8     Results from last 7 days   Lab Units 02/18/24  1208   AST U/L 16   ALT U/L 5*   ALK PHOS U/L 77   TOTAL PROTEIN g/dL 8.0   ALBUMIN g/dL 4.0   TOTAL BILIRUBIN mg/dL 0.50         Results from last 7 days   Lab Units 02/19/24  0459 02/18/24  1208   GLUCOSE RANDOM mg/dL 94 79         Results from last 7 days   Lab Units 02/18/24  1551 02/18/24  1414 02/18/24  1208   HS TNI 0HR ng/L  --   --  45   HS TNI 2HR ng/L  --  49  --    HSTNI D2 ng/L  --  4  --    HS TNI 4HR ng/L 53*  --   --    HSTNI D4 ng/L 8  --   --            Results from last 7 days   Lab Units 02/18/24  1208   BNP pg/mL 772*           Results from last 7 days   Lab Units 02/18/24  1458   CLARITY UA  Clear   COLOR UA  Light Yellow   SPEC GRAV UA  1.012   PH UA  6.5   GLUCOSE UA mg/dl Negative    KETONES UA mg/dl Negative   BLOOD UA  Large*   PROTEIN UA mg/dl Negative   NITRITE UA  Negative   BILIRUBIN UA  Negative   UROBILINOGEN UA (BE) mg/dl <2.0   LEUKOCYTES UA  Negative   WBC UA /hpf 1-2   RBC UA /hpf 30-50*   BACTERIA UA /hpf None Seen   EPITHELIAL CELLS WET PREP /hpf Occasional         ED Treatment:   Medication Administration from 02/18/2024 1047 to 02/18/2024 1735         Date/Time Order Dose Route Action     02/18/2024 1349 EST morphine injection 4 mg 4 mg Intravenous Given     02/18/2024 1349 EST ondansetron (ZOFRAN) injection 4 mg 4 mg Intravenous Given     02/18/2024 1548 EST oxyCODONE (ROXICODONE) IR tablet 5 mg 5 mg Oral Given     02/18/2024 1642 EST aspirin chewable tablet 162 mg 162 mg Oral Given          Past Medical History:   Diagnosis Date    ACS (acute coronary syndrome) (Formerly [facility]) 02/07/2021    Acute on chronic diastolic CHF 01/23/2019    Anemia 01/14/2023    Arthritis     Atrial fibrillation with rapid ventricular response ([facility]) 03/20/2016    Breast lump     CKD (chronic kidney disease) stage 3, GFR 30-59 ml/min (Formerly [facility])     Disease of thyroid gland     Elevated troponin 09/09/2019    Epigastric abdominal tenderness 08/15/2021    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 ([facility]ly [facility]) 05/25/2020    GERD (gastroesophageal reflux disease)     H/O transfusion 1987    Hepatitis C     resolved    History of tachycardia induced cardiomyopathy 05/2021    in setting of rapid atrial flutter in 05/2021 and again 06/2022    History of ventricular tachycardia 07/2016    s/p ICD    Hyperlipidemia     Hypertension     Hypokalemia 07/23/2023    Inappropriate ICD discharge for atrial flutter 04/2023    NSTEMI (non-ST elevated myocardial infarction) ([facility]ly [facility]) 12/26/2018    Sleep apnea     no cpap     Present on Admission:   Stage 4 chronic kidney disease (HCC)   Elevated troponin   Chest pain   Gastroesophageal reflux disease    Paroxysmal A-fib ([facility])   Right renal stone   HTN  (hypertension)      Admitting Diagnosis: Chest pain, unspecified [R07.9]  Chest pain [R07.9]  Abdominal pain [R10.9]  Elevated troponin [R79.89]  Age/Sex: 58 y.o. female  Admission Orders:  Scheduled Medications:  doxazosin, 2 mg, Oral, HS  ferrous gluconate, 324 mg, Oral, Once per day on Monday Wednesday Friday  metolazone, 5 mg, Oral, Once  metoprolol succinate, 50 mg, Oral, Daily  nicotine, 1 patch, Transdermal, Daily  nystatin, , Topical, Daily  pantoprazole, 40 mg, Oral, Daily  potassium chloride, 40 mEq, Oral, Once  predniSONE, 50 mg, Oral, Daily  torsemide, 100 mg, Oral, Daily      Continuous IV Infusions:     PRN Meds:  HYDROmorphone, 0.5 mg, Intravenous, Q2H PRN  oxyCODONE, 5 mg, Oral, Q4H PRN   Or  oxyCODONE, 7.5 mg, Oral, Q4H PRN        IP CONSULT TO CARDIOLOGY  IP CONSULT TO GASTROENTEROLOGY    Network Utilization Review Department  ATTENTION: Please call with any questions or concerns to [phone] and carefully listen to the prompts so that you are directed to the right person. All voicemails are confidential.   For Discharge needs, contact Care Management DC Support Team at [phone] opt. [phone]  Send all requests for admission clinical reviews, approved or denied determinations and any other requests to dedicated fax number below belonging to the campus where the patient is receiving treatment. List of dedicated fax numbers for the Facilities:  FACILITY NAME UR FAX NUMBER   ADMISSION DENIALS (Administrative/Medical Necessity) [phone]   DISCHARGE SUPPORT TEAM (NETWORK) [phone]   PARENT CHILD HEALTH (Maternity/NICU/Pediatrics) [phone]   [facility] [phone]   [facility] [phone]   [facility] [phone]   [facility] [phone]   [facility] [phone]   [facility] [phone]   [facility]  [phone]   [facility] [phone]   [facility] [phone]   [facility] [phone]   [facility] [phone]   [facility] [phone]

## 2024-02-19 NOTE — ASSESSMENT & PLAN NOTE
Troponins in the ED were 45. 49. 53. No need to continue trending as they are relatively unremarkable. Cardio will also see patient and can determine if further troponins are needed.   Random AM troponin on 2/19 56
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
## 2024-02-19 NOTE — ASSESSMENT & PLAN NOTE
Patient states she has a hemorrhoid which she has had for years. She has never had bleeding episodes like this before. She admits to some rectal pain, no itching.   CT C/A/P on admission negative for signs diverticulitis  Hemoglobin stable on admission at 13.4. baseline is 11-13.  Plan:  -most likely source of bleeding could be from the hemorrhoid  -GI consulted  - Holding patient's home Xarelto until the source of bleeding is discovered and non-active  - Will watch AM CBC for hemoglobin  - Consider trending H&Hs if necessary

## 2024-02-19 NOTE — ASSESSMENT & PLAN NOTE
CT C/A/P on admission shows 3 mm non obstructing right renal stone. Patient states she has never had a kidney stone before. However, this is the opposite side of her abdominal pain which is left sided, so unlikely source of her pain.      Pain:  - continue to monitor  -Pain regimen in place  - stone likely to pass on its own due to small enough size

## 2024-02-19 NOTE — ASSESSMENT & PLAN NOTE
Patient has history of a-fib, on Xarelto at home.    Plan:  - holding Xarelto for now due to bright red blood per rectum. Can continue if bleeding no longer occurs and seems safe

## 2024-02-19 NOTE — ASSESSMENT & PLAN NOTE
Patient has left sided abdominal pain that started yesterday. It is LLQ only and occurs when she feels she has to defecate and during defecation. She also noted bright red blood per rectum for the first time ever. She was treated with Cefpodoxime around 1/7/24. At that time she did have signs of infection like fever and chills whereas this time she does not.   CT C/A/P on admission: 3 mm non obstructing right renal calculus.   UA and urine microscopic also shows blood in urine.   Patient's left sided abdominal pain seems unrelated to this renal stone finding. Unclear if blood in urine really came from rectum versus urine.   Plan:  - Pain regimen 0.5 dilaudid for breakthrough. Oxy 5 and 7.5 for moderate and severe pain respectively  - GI consulted  - Keep checking AM CBC for hemoglobin stability

## 2024-02-19 NOTE — ASSESSMENT & PLAN NOTE
Lab Results   Component Value Date    EGFR 35 02/18/2024    EGFR 28 02/05/2024    EGFR 32 02/04/2024    CREATININE 1.58 (H) 02/18/2024    CREATININE 1.92 (H) 02/05/2024    CREATININE 1.71 (H) 02/04/2024   Patient's baseline appears to be around 1.5-1.8. Within this limit at this time, no DANIELA. Will continue to monitor with AM labs.

## 2024-02-19 NOTE — ASSESSMENT & PLAN NOTE
Patient has history of paroxsymal atrial fibrillation and Non-STEMI in 2018. Patient had ICD placed in 2016.   Patient states she has never been on aspirin or Plavix after hear heart attack  Plan:  -cardio consult with ICD interrogation ordered

## 2024-02-19 NOTE — ASSESSMENT & PLAN NOTE
Patient states that she sometimes gets fungal infections under her abdomen, around her  scar. Uses Nystatin powder for this at home, will continue.

## 2024-02-19 NOTE — ASSESSMENT & PLAN NOTE
Patient states she has been taking prednisone 50 mg for about a month. Although an unclear history due to poor historian, she was prescribed it by her outpatient GI after an attempt at a colonoscopy in January. She will be following up with them soon in order to get another one as the first attempt was insufficient.     Plan:  - For now continue this home prednisone 50 mg daily, especially until we know what is causing her GI problems during this stay

## 2024-02-20 VITALS
TEMPERATURE: 97.5 F | WEIGHT: 216.49 LBS | HEIGHT: 68 IN | RESPIRATION RATE: 18 BRPM | SYSTOLIC BLOOD PRESSURE: 121 MMHG | OXYGEN SATURATION: 96 % | BODY MASS INDEX: 32.81 KG/M2 | HEART RATE: 60 BPM | DIASTOLIC BLOOD PRESSURE: 69 MMHG

## 2024-02-20 PROBLEM — R07.9 CHEST PAIN: Status: RESOLVED | Noted: 2023-10-05 | Resolved: 2024-02-20

## 2024-02-20 PROBLEM — I50.9 CHF, ACUTE ON CHRONIC (HCC): Status: RESOLVED | Noted: 2024-02-18 | Resolved: 2024-02-20

## 2024-02-20 PROBLEM — Z79.52 ON PREDNISONE THERAPY: Status: RESOLVED | Noted: 2024-02-18 | Resolved: 2024-02-20

## 2024-02-20 PROBLEM — K59.00 CONSTIPATION: Status: ACTIVE | Noted: 2024-02-20

## 2024-02-20 PROBLEM — R79.89 ELEVATED TROPONIN: Status: RESOLVED | Noted: 2024-02-03 | Resolved: 2024-02-20

## 2024-02-20 LAB
ANION GAP SERPL CALCULATED.3IONS-SCNC: 8 MMOL/L
BUN SERPL-MCNC: 24 MG/DL (ref 5–25)
CALCIUM SERPL-MCNC: 9.6 MG/DL (ref 8.4–10.2)
CHLORIDE SERPL-SCNC: 95 MMOL/L (ref 96–108)
CO2 SERPL-SCNC: 31 MMOL/L (ref 21–32)
CREAT SERPL-MCNC: 1.92 MG/DL (ref 0.6–1.3)
ERYTHROCYTE [DISTWIDTH] IN BLOOD BY AUTOMATED COUNT: 14.2 % (ref 11.6–15.1)
GFR SERPL CREATININE-BSD FRML MDRD: 28 ML/MIN/1.73SQ M
GLUCOSE P FAST SERPL-MCNC: 117 MG/DL (ref 65–99)
GLUCOSE SERPL-MCNC: 117 MG/DL (ref 65–140)
HCT VFR BLD AUTO: 39.3 % (ref 34.8–46.1)
HGB BLD-MCNC: 12.4 G/DL (ref 11.5–15.4)
MAGNESIUM SERPL-MCNC: 2 MG/DL (ref 1.9–2.7)
MCH RBC QN AUTO: 26.4 PG (ref 26.8–34.3)
MCHC RBC AUTO-ENTMCNC: 31.6 G/DL (ref 31.4–37.4)
MCV RBC AUTO: 84 FL (ref 82–98)
PLATELET # BLD AUTO: 174 THOUSANDS/UL (ref 149–390)
PMV BLD AUTO: 11.8 FL (ref 8.9–12.7)
POTASSIUM SERPL-SCNC: 3.9 MMOL/L (ref 3.5–5.3)
RBC # BLD AUTO: 4.69 MILLION/UL (ref 3.81–5.12)
SODIUM SERPL-SCNC: 134 MMOL/L (ref 135–147)
WBC # BLD AUTO: 8.97 THOUSAND/UL (ref 4.31–10.16)

## 2024-02-20 PROCEDURE — 99232 SBSQ HOSP IP/OBS MODERATE 35: CPT | Performed by: INTERNAL MEDICINE

## 2024-02-20 PROCEDURE — 99239 HOSP IP/OBS DSCHRG MGMT >30: CPT | Performed by: HOSPITALIST

## 2024-02-20 PROCEDURE — 83735 ASSAY OF MAGNESIUM: CPT | Performed by: INTERNAL MEDICINE

## 2024-02-20 PROCEDURE — 85027 COMPLETE CBC AUTOMATED: CPT

## 2024-02-20 PROCEDURE — 80048 BASIC METABOLIC PNL TOTAL CA: CPT

## 2024-02-20 RX ORDER — BUMETANIDE 2 MG/1
2 TABLET ORAL 2 TIMES DAILY
Qty: 60 TABLET | Refills: 0 | Status: SHIPPED | OUTPATIENT
Start: 2024-02-20 | End: 2024-03-21

## 2024-02-20 RX ORDER — POTASSIUM CHLORIDE 20 MEQ/1
20 TABLET, EXTENDED RELEASE ORAL DAILY
Qty: 360 TABLET | Refills: 0 | Status: SHIPPED | OUTPATIENT
Start: 2024-02-20

## 2024-02-20 RX ORDER — BUMETANIDE 1 MG/1
2 TABLET ORAL
Status: DISCONTINUED | OUTPATIENT
Start: 2024-02-20 | End: 2024-02-20 | Stop reason: HOSPADM

## 2024-02-20 RX ORDER — POLYETHYLENE GLYCOL 3350 17 G/17G
17 POWDER, FOR SOLUTION ORAL DAILY
Qty: 10 EACH | Refills: 0 | Status: SHIPPED | OUTPATIENT
Start: 2024-02-21

## 2024-02-20 RX ORDER — POTASSIUM CHLORIDE 20 MEQ/1
20 TABLET, EXTENDED RELEASE ORAL DAILY
Status: DISCONTINUED | OUTPATIENT
Start: 2024-02-20 | End: 2024-02-20 | Stop reason: HOSPADM

## 2024-02-20 RX ADMIN — BUMETANIDE 2 MG: 0.25 INJECTION INTRAMUSCULAR; INTRAVENOUS at 08:21

## 2024-02-20 RX ADMIN — NYSTATIN: 100000 POWDER TOPICAL at 08:20

## 2024-02-20 RX ADMIN — METOPROLOL SUCCINATE 50 MG: 50 TABLET, EXTENDED RELEASE ORAL at 08:19

## 2024-02-20 RX ADMIN — POTASSIUM CHLORIDE 20 MEQ: 1500 TABLET, EXTENDED RELEASE ORAL at 11:50

## 2024-02-20 RX ADMIN — Medication 7.5 MG: at 11:50

## 2024-02-20 RX ADMIN — PANTOPRAZOLE SODIUM 40 MG: 40 TABLET, DELAYED RELEASE ORAL at 08:19

## 2024-02-20 RX ADMIN — Medication 7.5 MG: at 04:14

## 2024-02-20 RX ADMIN — POLYETHYLENE GLYCOL 3350 17 G: 17 POWDER, FOR SOLUTION ORAL at 08:19

## 2024-02-20 RX ADMIN — NICOTINE 1 PATCH: 7 PATCH, EXTENDED RELEASE TRANSDERMAL at 08:20

## 2024-02-20 NOTE — PROGRESS NOTES
Progress Note- Lupe Menjivar 58 y.o. female MRN: [id number]    Unit/Bed#: S -01 Encounter: [id number]      Assessment and Plan:    1) Hematochezia-     Recent Labs     02/18/24  1208 02/19/24  0459 02/20/24  0607   HGB 13.4 11.2* 12.4       With history of diverticulitis   Patient had attempted colonoscopy last month with inadequate prep  Patient had been treated with cefpodoxime in the past month  C diff testing held on admission given absence of diarrhea   Held home Xarelto   No clinical signs of diverticulitis   CT showed no acute abdominal pathology  Hgb stabilizing      Plan:   Follow up with GI outpatient for colonoscopy as previously scheduled.   Pain regimen per Primary service; taper as tolerated     Miralax daily JEISON     2) GERD  Continue home oral Protonix 40 mg daily  ______________________________________________________________________    Subjective:     Patient reports LLQ pain and tenderness. Otherwise no acute distress. AAOX3 conversing comfortably. No bowel movement since admission. Discussed laxatives and outpatient follow up planned with GI. Patient agreeable with no further questions at this time.     Medication Administration - last 24 hours from 02/19/2024 0940 to 02/20/2024 0940         Date/Time Order Dose Route Action Action by     02/20/2024 0841 EST nicotine (NICODERM CQ) 7 mg/24hr TD 24 hr patch 1 patch 1 patch Transdermal Patch Removed Jennie Castro RN     02/20/2024 0820 EST nicotine (NICODERM CQ) 7 mg/24hr TD 24 hr patch 1 patch 1 patch Transdermal Medication Applied Jennie Castro RN     02/20/2024 0819 EST metoprolol succinate (TOPROL-XL) 24 hr tablet 50 mg 50 mg Oral Given Jennie Castro RN     02/20/2024 0820 EST nystatin (MYCOSTATIN) powder -- Topical Given Jennie Castro RN     02/20/2024 0819 EST pantoprazole (PROTONIX) EC tablet 40 mg 40 mg Oral Given Jennie Castro RN     02/19/2024 1150 EST predniSONE tablet 50 mg 50 mg Oral Given Jennie Castro RN     02/20/2024 0414 EST  "oxyCODONE (ROXICODONE) IR tablet 5 mg -- Oral See Alternative Yaniv Mendoza RN     02/19/2024 1637 EST oxyCODONE (ROXICODONE) IR tablet 5 mg -- Oral See Alternative Aubree Akhtar RN     02/19/2024 1221 EST oxyCODONE (ROXICODONE) IR tablet 5 mg -- Oral See Alternative Jennie Castro RN     02/20/2024 0414 EST oxyCODONE (ROXICODONE) split tablet 7.5 mg 7.5 mg Oral Given Yaniv Mendoza RN     02/19/2024 1637 EST oxyCODONE (ROXICODONE) split tablet 7.5 mg 7.5 mg Oral Given Aubree Akhtar RN     02/19/2024 1221 EST oxyCODONE (ROXICODONE) split tablet 7.5 mg 7.5 mg Oral Given Jennie Castro RN     02/19/2024 1911 EST HYDROmorphone (DILAUDID) injection 0.5 mg 0.5 mg Intravenous Given Aubree Ahktar RN     02/19/2024 1435 EST HYDROmorphone (DILAUDID) injection 0.5 mg 0.5 mg Intravenous Given Jennie Castro RN     02/19/2024 1150 EST potassium chloride (Klor-Con M20) CR tablet 40 mEq 40 mEq Oral Given Jennie Castro RN     02/20/2024 0821 EST bumetanide (BUMEX) injection 2 mg 2 mg Intravenous Given Jennie Castro RN     02/19/2024 1901 EST bumetanide (BUMEX) injection 2 mg 2 mg Intravenous Given Aubree Akhtar RN     02/19/2024 1346 EST bumetanide (BUMEX) injection 2 mg 2 mg Intravenous Given Jennie Castro RN     02/20/2024 0819 EST polyethylene glycol (MIRALAX) packet 17 g 17 g Oral Given Jennie Castro RN     02/19/2024 1436 EST polyethylene glycol (MIRALAX) packet 17 g 17 g Oral Given Jennie Castro RN            Objective:     Vitals: Blood pressure 121/69, pulse 60, temperature 97.5 °F (36.4 °C), resp. rate 18, height 5' 7.5\" (1.715 m), weight 98.2 kg (216 lb 7.9 oz), SpO2 96%, not currently breastfeeding.,Body mass index is 33.41 kg/m².      Intake/Output Summary (Last 24 hours) at 2/20/2024 0940  Last data filed at 2/20/2024 0900  Gross per 24 hour   Intake 1440 ml   Output 1 ml   Net 1439 ml       Physical Exam:   General Appearance: Awake and alert, in no acute distress  Abdomen: Soft, non-tender, non-distended; bowel " sounds normal; no masses or no organomegaly    Invasive Devices       Peripheral Intravenous Line  Duration             Peripheral IV 02/18/24 Left;Ventral (anterior) Forearm 1 day                    Lab Results:  Admission on 02/18/2024   Component Date Value    Ventricular Rate 02/18/2024 64     Atrial Rate 02/18/2024 280     QRSD Interval 02/18/2024 166     QT Interval 02/18/2024 488     QTC Interval 02/18/2024 503     QRS Axis 02/18/2024 -87     T Wave Axis 02/18/2024 93     WBC 02/18/2024 6.69     RBC 02/18/2024 5.02     Hemoglobin 02/18/2024 13.4     Hematocrit 02/18/2024 42.5     MCV 02/18/2024 85     MCH 02/18/2024 26.7 (L)     MCHC 02/18/2024 31.5     RDW 02/18/2024 14.6     MPV 02/18/2024 11.7     Platelets 02/18/2024 179     nRBC 02/18/2024 0     Neutrophils Relative 02/18/2024 72     Immat GRANS % 02/18/2024 0     Lymphocytes Relative 02/18/2024 19     Monocytes Relative 02/18/2024 7     Eosinophils Relative 02/18/2024 2     Basophils Relative 02/18/2024 0     Neutrophils Absolute 02/18/2024 4.75     Immature Grans Absolute 02/18/2024 0.02     Lymphocytes Absolute 02/18/2024 1.30     Monocytes Absolute 02/18/2024 0.48     Eosinophils Absolute 02/18/2024 0.11     Basophils Absolute 02/18/2024 0.03     Sodium 02/18/2024 136     Potassium 02/18/2024 4.0     Chloride 02/18/2024 99     CO2 02/18/2024 30     ANION GAP 02/18/2024 7     BUN 02/18/2024 15     Creatinine 02/18/2024 1.58 (H)     Glucose 02/18/2024 79     Calcium 02/18/2024 9.8     AST 02/18/2024 16     ALT 02/18/2024 5 (L)     Alkaline Phosphatase 02/18/2024 77     Total Protein 02/18/2024 8.0     Albumin 02/18/2024 4.0     Total Bilirubin 02/18/2024 0.50     eGFR 02/18/2024 35     hs TnI 0hr 02/18/2024 45     hs TnI 2hr 02/18/2024 49     Delta 2hr hsTnI 02/18/2024 4     BNP 02/18/2024 772 (H)     hs TnI 4hr 02/18/2024 53 (H)     Delta 4hr hsTnI 02/18/2024 8     Color, UA 02/18/2024 Light Yellow     Clarity, UA 02/18/2024 Clear     Specific  Gravity, UA 02/18/2024 1.012     pH, UA 02/18/2024 6.5     Leukocytes, UA 02/18/2024 Negative     Nitrite, UA 02/18/2024 Negative     Protein, UA 02/18/2024 Negative     Glucose, UA 02/18/2024 Negative     Ketones, UA 02/18/2024 Negative     Urobilinogen, UA 02/18/2024 <2.0     Bilirubin, UA 02/18/2024 Negative     Occult Blood, UA 02/18/2024 Large (A)     RBC, UA 02/18/2024 30-50 (A)     WBC, UA 02/18/2024 1-2     Epithelial Cells 02/18/2024 Occasional     Bacteria, UA 02/18/2024 None Seen     Sodium 02/19/2024 136     Potassium 02/19/2024 3.7     Chloride 02/19/2024 100     CO2 02/19/2024 29     ANION GAP 02/19/2024 7     BUN 02/19/2024 17     Creatinine 02/19/2024 1.77 (H)     Glucose 02/19/2024 94     Glucose, Fasting 02/19/2024 94     Calcium 02/19/2024 8.9     eGFR 02/19/2024 31     HS TnI random 02/19/2024 56 (H)     WBC 02/19/2024 4.71     RBC 02/19/2024 4.11     Hemoglobin 02/19/2024 11.2 (L)     Hematocrit 02/19/2024 35.2     MCV 02/19/2024 86     MCH 02/19/2024 27.3     MCHC 02/19/2024 31.8     RDW 02/19/2024 14.6     MPV 02/19/2024 11.6     Platelets 02/19/2024 140 (L)     nRBC 02/19/2024 0     Neutrophils Relative 02/19/2024 65     Immat GRANS % 02/19/2024 0     Lymphocytes Relative 02/19/2024 25     Monocytes Relative 02/19/2024 8     Eosinophils Relative 02/19/2024 2     Basophils Relative 02/19/2024 0     Neutrophils Absolute 02/19/2024 3.04     Immature Grans Absolute 02/19/2024 0.01     Lymphocytes Absolute 02/19/2024 1.18     Monocytes Absolute 02/19/2024 0.37     Eosinophils Absolute 02/19/2024 0.09     Basophils Absolute 02/19/2024 0.02     Sodium 02/20/2024 134 (L)     Potassium 02/20/2024 3.9     Chloride 02/20/2024 95 (L)     CO2 02/20/2024 31     ANION GAP 02/20/2024 8     BUN 02/20/2024 24     Creatinine 02/20/2024 1.92 (H)     Glucose 02/20/2024 117     Glucose, Fasting 02/20/2024 117 (H)     Calcium 02/20/2024 9.6     eGFR 02/20/2024 28     WBC 02/20/2024 8.97     RBC 02/20/2024 4.69      Hemoglobin 02/20/2024 12.4     Hematocrit 02/20/2024 39.3     MCV 02/20/2024 84     MCH 02/20/2024 26.4 (L)     MCHC 02/20/2024 31.6     RDW 02/20/2024 14.2     Platelets 02/20/2024 174     MPV 02/20/2024 11.8     Magnesium 02/20/2024 2.0        Imaging Studies: I have personally reviewed pertinent imaging studies.

## 2024-02-20 NOTE — ASSESSMENT & PLAN NOTE
Patient states she feels a left sided chest pressure that is how her CHF feels during flare ups. It is constant and occurs even at rest. It does not radiate. This episode started this morning.   EKG unchanged on admission from last EKG on 2/9/24.  Last ECHO was 1/6/24, no need to repeat at this time  Last stress test 10/23 normal  Plan:  - Telemetry stopped as cardio felt there was no indication at this time  -Cardio consulted to rule out ACS and acute on chronic CHF, appreciate any recs  -Cardio diuresed patient on 2 doses of bumex 2/19 due to fluid overload status  -Patient cleared for discharge by cardio on 2/20

## 2024-02-20 NOTE — ASSESSMENT & PLAN NOTE
Lab Results   Component Value Date    EGFR 28 02/20/2024    EGFR 31 02/19/2024    EGFR 35 02/18/2024    CREATININE 1.92 (H) 02/20/2024    CREATININE 1.77 (H) 02/19/2024    CREATININE 1.58 (H) 02/18/2024   Patient's baseline appears to be around 1.5-1.8. Within this limit at this time, no DANIELA. Will continue to monitor with AM labs.   - Epic alert came up today [facility] stating that patient meets criteria based on a 0.3 or more increase in her creatinine over 48 hours from 2/18-2/20. Patient does have periods of time where her creatinine reaches 2 chronically, so this is still considered her baseline. No DANIELA at this time.

## 2024-02-20 NOTE — ASSESSMENT & PLAN NOTE
Patient has left sided abdominal pain that started yesterday. It is LLQ only and occurs when she feels she has to defecate and during defecation. She also noted bright red blood per rectum for the first time ever. She was treated with Cefpodoxime around 1/7/24. At that time she did have signs of infection like fever and chills whereas this time she does not.   CT C/A/P on admission: 3 mm non obstructing right renal calculus.   UA and urine microscopic also shows blood in urine.   Patient's left sided abdominal pain seems unrelated to this renal stone finding. Unclear if blood in urine really came from rectum versus urine.   Plan:  - Pain regimen 0.5 dilaudid for breakthrough. Oxy 5 and 7.5 for moderate and severe pain respectively  - GI consulted, recommend colonoscopy outpatient as scheduled and gave her miralax. They stopped her 50 mg of prednisone as this was a short course from 11/23 for plantar fasciitis and should not be on her med list.   - Keep checking AM CBC for hemoglobin stability  -Patient cleared by GI for discharge 2/20

## 2024-02-20 NOTE — DISCHARGE SUMMARY
[facility]  Discharge- Lupe Menjivar 1965, 58 y.o. female MRN: [id number]  Unit/Bed#: S -01 Encounter: [id number]  Primary Care Provider: Rhina Pettit MD   Date and time admitted to hospital: 2/18/2024 11:53 AM    HTN (hypertension)  Assessment & Plan  Patient's home regimen is Torsemide 100 daily, doxazosin 2 mg daily, metoprolol 50 daily. Continue home meds    * Chest pain-resolved as of 2/20/2024  Assessment & Plan  Patient states she feels a left sided chest pressure that is how her CHF feels during flare ups. It is constant and occurs even at rest. It does not radiate. This episode started this morning.   EKG unchanged on admission from last EKG on 2/9/24.  Last ECHO was 1/6/24, no need to repeat at this time  Last stress test 10/23 normal  Plan:  - Telemetry stopped as cardio felt there was no indication at this time  -Cardio consulted to rule out ACS and acute on chronic CHF, appreciate any recs  -Cardio diuresed patient on 2 doses of bumex 2/19 due to fluid overload status  -Patient cleared for discharge by cardio on 2/20    CHF, acute on chronic (HCC)-resolved as of 2/20/2024  Assessment & Plan  Wt Readings from Last 3 Encounters:   02/20/24 98.2 kg (216 lb 7.9 oz)   02/09/24 99.1 kg (218 lb 6.4 oz)   02/05/24 97.1 kg (214 lb 1.1 oz)   Patient seems to have multiple past hospitalizations involving chest pain and acute on chronic CHF flare ups. Home regimen is Torsemide 100 mg daily, metolazone 5 mg as needed for weight gain from CHF.   Patient does have slight ankle edema compared to her usual baseline according to her. No JVD or hepatojugular reflex. No rales on exam. Does not seem to be acute flare up of CHF at this time, and her home meds seem to be managing it appropriately at this time, but will continue to monitor.  Plan:  -gave patient dose of metolazone 5 mg night of admission, 2/18.   - Daily weights  -Is and Os  - Cardio assesses patient is slightly  volume overloaded on 2/19 their recs are to hold home torsemide and gave her 2 doses of IV Bumex 2 mg today. Holding her home doxazosin. Stopped telemetry.  - Cardio changed home torsemide to bumex 2 mg BID + potassium chloride 20 mEq daily    Abdominal pain  Assessment & Plan  Patient has left sided abdominal pain that started yesterday. It is LLQ only and occurs when she feels she has to defecate and during defecation. She also noted bright red blood per rectum for the first time ever. She was treated with Cefpodoxime around 1/7/24. At that time she did have signs of infection like fever and chills whereas this time she does not.   CT C/A/P on admission: 3 mm non obstructing right renal calculus.   UA and urine microscopic also shows blood in urine.   Patient's left sided abdominal pain seems unrelated to this renal stone finding. Unclear if blood in urine really came from rectum versus urine.   Plan:  - Pain regimen 0.5 dilaudid for breakthrough. Oxy 5 and 7.5 for moderate and severe pain respectively  - GI consulted, recommend colonoscopy outpatient as scheduled and gave her miralax. They stopped her 50 mg of prednisone as this was a short course from 11/23 for plantar fasciitis and should not be on her med list.   - Keep checking AM CBC for hemoglobin stability  -Patient cleared by GI for discharge 2/20    Stage 4 chronic kidney disease (HCC)  Assessment & Plan  Lab Results   Component Value Date    EGFR 28 02/20/2024    EGFR 31 02/19/2024    EGFR 35 02/18/2024    CREATININE 1.92 (H) 02/20/2024    CREATININE 1.77 (H) 02/19/2024    CREATININE 1.58 (H) 02/18/2024   Patient's baseline appears to be around 1.5-1.8. Within this limit at this time, no DANIELA. Will continue to monitor with AM labs.   - Epic alert came up today [facility] stating that patient meets criteria based on a 0.3 or more increase in her creatinine over 48 hours from 2/18-2/20. Patient does have periods of time where her creatinine reaches 2  chronically, so this is still considered her baseline. No DANIELA at this time.     History of monomorphic ventricular tachycardia, s/p ICD in 2016  Assessment & Plan  Patient has history of paroxsymal atrial fibrillation and Non-STEMI in 2018. Patient had ICD placed in 2016.   Patient states she has never been on aspirin or Plavix after hear heart attack  Plan:  -cardio consult with ICD interrogation ordered    BRBPR (bright red blood per rectum)  Assessment & Plan  Patient states she has a hemorrhoid which she has had for years. She has never had bleeding episodes like this before. She admits to some rectal pain, no itching.   CT C/A/P on admission negative for signs diverticulitis  Hemoglobin stable on admission at 13.4. baseline is 11-13.  Plan:  -most likely source of bleeding could be from the hemorrhoid  -GI consulted  - Holding patient's home Xarelto until the source of bleeding is discovered and non-active  - Will watch AM CBC for hemoglobin  - Consider trending H&Hs if necessary    Right renal stone  Assessment & Plan  CT C/A/P on admission shows 3 mm non obstructing right renal stone. Patient states she has never had a kidney stone before. However, this is the opposite side of her abdominal pain which is left sided, so unlikely source of her pain.      Pain:  - continue to monitor  -Pain regimen in place  - stone likely to pass on its own due to small enough size    Paroxysmal A-fib (HCC)  Assessment & Plan  Patient has history of a-fib, on Xarelto at home.    Plan:  - holding Xarelto for now due to bright red blood per rectum. Can continue if bleeding no longer occurs and seems safe    Elevated troponin-resolved as of 2/20/2024  Assessment & Plan  Troponins in the ED were 45. 49. 53. No need to continue trending as they are relatively unremarkable. Cardio will also see patient and can determine if further troponins are needed.   Random AM troponin on 2/19 56    On prednisone therapy-resolved as of  2024  Assessment & Plan  Patient states she has been taking prednisone 50 mg for about a month. Although an unclear history due to poor historian, she was prescribed it by her outpatient GI after an attempt at a colonoscopy in January. She will be following up with them soon in order to get another one as the first attempt was insufficient.   -After calling patient's pharmacy , we learned that the prednisone was a short 5 day course due to plantar fasciitis. Patient has not filled since.  Plan:  - Stopped prednisone    Fungal skin disease  Assessment & Plan  Patient states that she sometimes gets fungal infections under her abdomen, around her  scar. Uses Nystatin powder for this at home, will continue.     Gastroesophageal reflux disease   Assessment & Plan  Patient takes Protonix at home, will continue.      Medical Problems       Resolved Problems  Date Reviewed: 2024            Resolved    * (Principal) Chest pain 2024     Resolved by  Nenita Fofana DO    Elevated troponin 2024     Resolved by  Nenita Fofana DO    CHF, acute on chronic (HCC) 2024     Resolved by  Nenita Fofana DO    On prednisone therapy 2024     Resolved by  Nenita Fofana DO        Discharging Resident: Nenita Fofana DO  Discharging Attending: Jose Lopez MD  PCP: Rhina Pettit MD  Admission Date:   Admission Orders (From admission, onward)       Ordered        24 1623  Place in Observation  Once                          Discharge Date: 24    Consultations During Hospital Stay:  Cardio  GI    Procedures Performed:   None    Significant Findings / Test Results:   None    Incidental Findings:   CT A/P wo contrast : 3 mm non-obstructing right renal calculus   I reviewed the above mentioned incidental findings with the patient and/or family and they expressed understanding.    Test Results Pending at Discharge (will require follow up):  None     Outpatient Tests Requested:  CMP in  1 week    Complications:  None    Reason for Admission: chest pain    Hospital Course:   Lupe Menjivar is a 58 y.o. female patient who originally presented to the hospital on 2/18/2024 due to left sided chest pressure and left sided abdominal pain. The chest pain feels like her usual CHF flare ups and is non-radiating and occurred at rest. The abdominal pain feels worse when she has to defecate or during defecation. She did have diverticulitis treated around 1/24 which included fever and chills but she does not have that now. In ED, EKG was found to be unremarkable from her last one on 2/9/24 and trops were unremarkable. Patient also stated new bright red blood per rectum which she never had before that was on her toilet paper when she wiped. Patient was admitted for further evaluation of her cardiac and GI symptoms. Cardio and GI were consulted. Cardio found her ot be volume overloaded on day one and gave her 2 doses of IV bumex which cleared her fluid out. GI approached her abdominal pain by giving her miralax, pain in abdomen most likely due to constipation. Patient does have history of hemorrhoid which was the most likely cause of bleeding. GI recommended she stick with her outpatient colonoscopy. Patient stable and ready for discharge on 2/20, medication education and compliance was talked about with her CHF medications to ensure she properly knows how to use them and understands their importance.     Please see above list of diagnoses and related plan for additional information.     Condition at Discharge: stable    Discharge Day Visit / Exam:   Subjective:  Patient seen at bedside this morning. She feels a lot less fluid overloaded today and her chest pressure has not returned. Her  LLQ abdominal pain is persistent.   Vitals: Blood Pressure: 121/69 (02/20/24 0721)  Pulse: 60 (02/20/24 0721)  Temperature: 97.5 °F (36.4 °C) (02/20/24 0721)  Temp Source: Oral (02/19/24 0745)  Respirations: 18 (02/20/24  "0721)  Height: 5' 7.5\" (171.5 cm) (02/18/24 1630)  Weight - Scale: 98.2 kg (216 lb 7.9 oz) (02/20/24 0600)  SpO2: 96 % (02/20/24 0721)  Exam:   Physical Exam  Constitutional:       Appearance: Normal appearance.   HENT:      Head: Normocephalic.      Mouth/Throat:      Mouth: Mucous membranes are moist.      Pharynx: Oropharynx is clear.   Cardiovascular:      Rate and Rhythm: Normal rate and regular rhythm.      Pulses: Normal pulses.      Heart sounds: Normal heart sounds.   Pulmonary:      Effort: Pulmonary effort is normal.      Breath sounds: Normal breath sounds. No wheezing, rhonchi or rales.   Abdominal:      General: Abdomen is flat. Bowel sounds are normal.      Palpations: Abdomen is soft.      Tenderness: There is abdominal tenderness.      Comments: Tender   to LLQ upon palpation   Musculoskeletal:      Right lower leg: No edema.      Left lower leg: No edema.      Comments: Ankle edema absent, the best they have looked during this stay   Skin:     General: Skin is warm and dry.      Capillary Refill: Capillary refill takes less than 2 seconds.   Neurological:      General: No focal deficit present.      Mental Status: She is alert and oriented to person, place, and time.   Psychiatric:         Mood and Affect: Mood normal.         Behavior: Behavior normal.          Discussion with Family: Patient declined call to .     Discharge instructions/Information to patient and family:   See after visit summary for information provided to patient and family.      Provisions for Follow-Up Care:  See after visit summary for information related to follow-up care and any pertinent home health orders.      Mobility at time of Discharge:   Basic Mobility Inpatient Raw Score: 23  JH-HLM Goal: 7: Walk 25 feet or more  JH-HLM Achieved: 6: Walk 10 steps or more  HLM Goal NOT achieved. Continue to encourage mobility in post discharge setting.     Disposition:   Home    Planned Readmission: No    Discharge " Medications:  See after visit summary for reconciled discharge medications provided to patient and/or family.      **Please Note: This note may have been constructed using a voice recognition system**

## 2024-02-20 NOTE — PROGRESS NOTES
General Cardiology   Progress Note -  [facility] One   Lupekatya Menjivar 58 y.o. female MRN: [id number]    Unit/Bed#: S -01 Encounter: [id number]    Assessment  1. Atypical chest pain w/ very mild troponin elevation -suspect secondary to # 2  -No current complaints of CP.  -HS troponin levels 45--49-/53-/56.  -V paced cardiac rhythm on admission  -Unremarkable pharmacologic stress test in 10/2023.  -Normal LHC in 2019  2. Acute on chronic diastolic CHF exacerbation --etiology stemming from medication noncompliance  -Volume status improved over the past 24 hours, appears euvolemic on exam this a.m.  Weight is down 6 pounds from yesterday. Lung fields are clear, no current supplemental O2 requirements.  -BNP level 772, previously 614.  -Chest x-ray read as 'no acute cardiopulmonary disease' but appears to have some mild pulmonary vascular congestion.  -Outpatient diuretic regimen; patient was ordered torsemide 100 mg daily and metolazone 5 mg as needed--however patient states that she was only taking 40 mg of torsemide in the a.m. and intermittently taking 40 mg in the afternoon; she has utilized 1 dose of metolazone within the last week.  -Inpatient diuretic regimen; IV Bumex 2 mg twice daily  -Baseline dry weight around 214 pounds.  Recent office weight 219 pounds, home scale weight 216 pounds on 2/17; SS weight 221 pounds on admission, currently 216 pounds.    3. Hx of recovered NICM - tachy-mediated   -TTE 1/6/2024; LVEF 50 to 55%, grade 2 DD, LA mildly dilated, RV normal size/systolic function, RA normal,  -Outpatient GDMT; metoprolol succinate 50 mg daily (continued); not previously on ARNI/ACE/ARB, aldosterone antagonist or SGL T2 inhibitor in the setting of advanced CKD.  4. History of monomorphic VT  s/p MDT DC AICD in situ (since 7/2016)  5. History of persistent atrial flutter/atrial fibrillation s/p AVJ ablation (5/2023)  -[id number] = 3 (sex, HF, HTN)  -On metoprolol succinate 50 mg daily  -On Xarelto 15  mg daily.  6. Hypertension  -/72 last recorded 121/69, HR 60.  -Outpatient BP regimen; doxazosin 2 mg at HS, and  metoprolol succinate 50 mg daily  -Inpatient BP regimen; metoprolol succinate 50 mg daily  7. Hyperlipidemia  -Lipid panel (4/5/2023) - /triglycerides 83/HDL 40/LDL 68  -Not maintained on statin therapy as an outpatient  8. Nicotine dependence.  -Longstanding history of.  Currently smoking half a pack per day. Smoking cessation advised.  Continue patch ordered.  9.  Mild anemia/hematochezia.  -GI consulted --recommending outpatient colonoscopy.  -Xarelto on hold.     Plan.  -Patient denies any specific cardiac complaints this a.m.  -DC further IV diuretics.  Start oral Bumex 2 mg BID + Kdur 20 mEq daily.  Pt states even though she was intermittently missing doses of torsemide and taking the incorrect ordered doses she still feels as though her urinary outputs are not as robust as they are with Bumex.  Recommend utilizing metolazone 5 mg as needed for weight gain of 2 to 3 pounds in 24 hours.  I educated her on how to properly take her metolazone at least 45 minutes prior to her morning Bumex dose.   -DC doxazosin.  Continue metoprolol succinate 50 mg daily.  -Will arrange for her to have a close outpatient cardiology follow-up.  She was counseled extensively on medication compliance and adhering to a low-sodium diet as well as a daily fluid restriction.    Subjective  Review of Systems   Constitutional: Negative for chills, fever and malaise/fatigue.   Eyes:  Negative for visual disturbance.   Cardiovascular:  Negative for chest pain, dyspnea on exertion, leg swelling, orthopnea and palpitations.   Respiratory:  Negative for cough and shortness of breath.    Gastrointestinal:  Positive for abdominal pain.        Left lower quadrant abdominal pain/tenderness.   Neurological:  Negative for dizziness, headaches and light-headedness.   All other systems reviewed and are negative.      Objective:  "  Physical Exam  Vitals and nursing note reviewed.   Constitutional:       General: She is not in acute distress.     Appearance: She is obese. She is not diaphoretic.   HENT:      Head: Normocephalic and atraumatic.   Eyes:      General: No scleral icterus.  Neck:      Comments: No JVD.  Cardiovascular:      Rate and Rhythm: Normal rate and regular rhythm.      Pulses: Normal pulses.      Heart sounds: Normal heart sounds.   Pulmonary:      Effort: Pulmonary effort is normal.      Breath sounds: Normal breath sounds. No wheezing or rales.   Abdominal:      Palpations: Abdomen is soft.      Tenderness: There is abdominal tenderness.   Musculoskeletal:      Right lower leg: No edema.      Left lower leg: No edema.   Skin:     General: Skin is warm and dry.      Capillary Refill: Capillary refill takes less than 2 seconds.   Neurological:      General: No focal deficit present.      Mental Status: She is alert and oriented to person, place, and time.   Psychiatric:         Mood and Affect: Mood normal.         Vitals: Blood pressure 121/69, pulse 60, temperature 97.5 °F (36.4 °C), resp. rate 18, height 5' 7.5\" (1.715 m), weight 98.2 kg (216 lb 7.9 oz), SpO2 96%, not currently breastfeeding.,     Body mass index is 33.41 kg/m².,   Systolic (24hrs), Av , Min:121 , Max:131     Diastolic (24hrs), Av, Min:69, Max:76      Intake/Output Summary (Last 24 hours) at 2024 1027  Last data filed at 2024 0900  Gross per 24 hour   Intake 1440 ml   Output 1 ml   Net 1439 ml     Weight (last 2 days)       Date/Time Weight    24 0600 98.2 (216.49)    24 0600 100 (221)    24 1630 99.8 (220)            LABORATORY RESULTS      CBC with diff:   Results from last 7 days   Lab Units 24  0607 24  0459 24  1208   WBC Thousand/uL 8.97 4.71 6.69   HEMOGLOBIN g/dL 12.4 11.2* 13.4   HEMATOCRIT % 39.3 35.2 42.5   MCV fL 84 86 85   PLATELETS Thousands/uL 174 140* 179   RBC Million/uL 4.69 4.11 " "5.02   MCH pg 26.4* 27.3 26.7*   MCHC g/dL 31.6 31.8 31.5   RDW % 14.2 14.6 14.6   MPV fL 11.8 11.6 11.7   NRBC AUTO /100 WBCs  --  0 0       CMP:  Results from last 7 days   Lab Units 24  0607 24  0459 24  1208   POTASSIUM mmol/L 3.9 3.7 4.0   CHLORIDE mmol/L 95* 100 99   CO2 mmol/L 31 29 30   BUN mg/dL 24 17 15   CREATININE mg/dL 1.92* 1.77* 1.58*   CALCIUM mg/dL 9.6 8.9 9.8   AST U/L  --   --  16   ALT U/L  --   --  5*   ALK PHOS U/L  --   --  77   EGFR ml/min/1.73sq m 28 31 35       BMP:  Results from last 7 days   Lab Units 24  0607 24  04524  1208   POTASSIUM mmol/L 3.9 3.7 4.0   CHLORIDE mmol/L 95* 100 99   CO2 mmol/L 31 29 30   BUN mg/dL 24 17 15   CREATININE mg/dL 1.92* 1.77* 1.58*   CALCIUM mg/dL 9.6 8.9 9.8       Lab Results   Component Value Date    NTBNP 9,053 (H) 2023    NTBNP 5,423 (H) 2023    NTBNP 16,909 (H) 2022        Results from last 7 days   Lab Units 24  0607   MAGNESIUM mg/dL 2.0                         Lipid Profile:   No results found for: \"CHOL\"  Lab Results   Component Value Date    HDL 40 (L) 2023    HDL 42 (L) 2021    HDL 43 2020     Lab Results   Component Value Date    LDLCALC 68 2023    LDLCALC 57 2021    LDLCALC 72 2020     Lab Results   Component Value Date    TRIG 83 2023    TRIG 80.9 2021    TRIG 89 2020       Cardiac testing:   Results for orders placed during the hospital encounter of 21    Echo complete with contrast if indicated    Narrative  [address] [address]    Transthoracic Echocardiogram  2D, M-mode, Doppler, and Color Doppler    Study date:  25-May-2021    Patient: CHAD UREÑA  MR number: [id number]  Account number: [id number]  : 1965  Age: 56 years  Gender: Female  Status: Inpatient  Location: [facility]  Height: 67 in  Weight: 212 lb  BP: 118/ 62 mmHg    Indications: " Afib    Diagnoses: I48.0 - Atrial fibrillation    Sonographer:  PRISCILLA Hannah  Referring Physician:  Mark Santos MD  Group:  [facility]  Interpreting Physician:  Ria Cooper MD    SUMMARY    LEFT VENTRICLE:  Systolic function was moderately to markedly reduced. Ejection fraction was estimated to be 30 %.  There was moderate diffuse hypokinesis.  There was severe concentric hypertrophy. There was significant hypertrophy of the LV apex.    RIGHT VENTRICLE:  The size was normal.  Systolic function was reduced.    LEFT ATRIUM:  The atrium was dilated.    HISTORY: PRIOR HISTORY: Cocaine abuse, Smoker, CKD III, Congestive heart failure. Hypertrophic cardiomyopathy. Atrial fibrillation. Risk factors: hypercholesterolemia. PRIOR PROCEDURES: ICD implantation. Arrhythmia ablation.    PROCEDURE: The study was performed in the [facility]. This was a routine study. The transthoracic approach was used. The study included complete 2D imaging, M-mode, complete spectral Doppler, and color Doppler. The heart rate was 138  bpm, at the start of the study. Images were obtained from the parasternal, apical, subcostal, and suprasternal notch acoustic windows. Intravenous contrast ( .6 mL Definity in NSS) was administered. Echocardiographic views were limited due  to poor acoustic window availability and lung interference. This was a technically difficult study.    LEFT VENTRICLE: Size was normal. Systolic function was moderately to markedly reduced. Ejection fraction was estimated to be 30 %. There was moderate diffuse hypokinesis. Wall thickness was markedly increased. There was severe concentric  hypertrophy. There was significant hypertrophy of the LV apex. DOPPLER: Due to tachycardia, there was fusion of early and atrial contributions to ventricular filling. The study was not technically sufficient to allow evaluation of LV  diastolic function.    RIGHT VENTRICLE: The size was normal. Systolic  function was reduced. Wall thickness was normal. A pacing wire was present.    LEFT ATRIUM: The atrium was dilated.    RIGHT ATRIUM: Size was normal. A pacing wire was present.    MITRAL VALVE: Valve structure was normal. There was normal leaflet separation. DOPPLER: The transmitral velocity was within the normal range. There was no evidence for stenosis. There was trace regurgitation.    AORTIC VALVE: The valve was trileaflet. Leaflets exhibited normal thickness and normal cuspal separation. DOPPLER: Transaortic velocity was within the normal range. There was no evidence for stenosis. There was no significant  regurgitation.    TRICUSPID VALVE: The valve structure was normal. There was normal leaflet separation. DOPPLER: The transtricuspid velocity was within the normal range. There was no evidence for stenosis. There was trace regurgitation. Pulmonary artery  systolic pressure was within the normal range. Estimated peak PA pressure was 21 mmHg.    PULMONIC VALVE: Leaflets exhibited normal thickness, no calcification, and normal cuspal separation. DOPPLER: The transpulmonic velocity was within the normal range. There was trace regurgitation.    PERICARDIUM: There was no pericardial effusion. The pericardium was normal in appearance.    AORTA: The root exhibited normal size.    SYSTEMIC VEINS: IVC: The inferior vena cava was normal in size. Respirophasic changes were normal.    SYSTEM MEASUREMENT TABLES    2D  %FS: 27.49 %  Ao Diam: 2.77 cm  EDV(Teich): 57.94 ml  EF(Teich): 54.26 %  ESV(Teich): 26.5 ml  IVSd: 2.46 cm  LA Area: 26.06 cm2  LA Diam: 4.27 cm  LVIDd: 3.7 cm  LVIDs: 2.68 cm  LVPWd: 1.79 cm  RA Area: 18.49 cm2  RVIDd: 3.01 cm  RWT: 0.97  SV(Teich): 31.44 ml    CW  TR Vmax: 2.14 m/s  TR maxP.28 mmHg    MM  TAPSE: 1.51 cm    [employer] Accredited Echocardiography Laboratory    Prepared and electronically signed by    Ria Cooper MD  Signed 25-May-2021 14:44:53    Results for orders  placed during the hospital encounter of 20    HUBER    Narrative  [address] [address] [phone]    Transesophageal Echocardiogram  2D, Doppler, and Color Doppler    Study date:  20-May-2020    Patient: CHAD UREÑA  MR number: [id number]  Account number: [id number]  : 1965  Age: 55 years  Gender: Female  Status: Outpatient  Location: Cath lab  Height: 67 in  Weight: 221 lb  BP:    Indications: AFIB    Diagnoses: I48.0 - Atrial fibrillation    Sonographer:  PRISCILLA Hannah  Interpreting Physician:  Hermilo Lopez MD  Primary Physician:  Gregorio Tavares MD  Referring Physician:  Hermilo Lopez MD  Group:  [facility]    SUMMARY    LEFT VENTRICLE:  Systolic function was normal. Ejection fraction was estimated to be 60 %.  Wall thickness was moderately increased.  There was moderate concentric hypertrophy.    LEFT ATRIUM:  The atrium was mildly dilated.    LEFT ATRIAL APPENDAGE:  The size was normal.  The function was normal (normal emptying velocity).  No thrombus was identified.    ATRIAL SEPTUM:  No defect or patent foramen ovale was identified.    HISTORY: PRIOR HISTORY: AFIB, PPM, MI, HOCM, HTN, HLD, CKD III, Smoker, Cocaine abuse    PROCEDURE: The procedure was performed in the catheterization laboratory. This was a routine study. The risks and alternatives of the procedure were explained to the patient and informed consent was obtained. The transesophageal approach  was used. The study included complete 2D imaging, complete spectral Doppler, and color Doppler. An adult omniplane probe was inserted by the attending cardiologist. Intubated with ease. One intubation attempt(s). There was no blood  detected on the probe. Image quality was adequate. MEDICATIONS: Anesthesia.    LEFT VENTRICLE: Size was normal. Systolic function was normal. Ejection fraction was estimated to be 60 %. Wall thickness was  "moderately increased. There was moderate concentric hypertrophy.    RIGHT VENTRICLE: The size was normal. Systolic function was normal. Wall thickness was normal. A pacing wire was present.    LEFT ATRIUM: The atrium was mildly dilated. No mass was present. There was no spontaneous echo contrast (\"smoke\"). APPENDAGE: The size was normal. No thrombus was identified. DOPPLER: The function was normal (normal emptying velocity).    ATRIAL SEPTUM: No defect or patent foramen ovale was identified.    RIGHT ATRIUM: Size was normal. No thrombus was identified.    MITRAL VALVE: Valve structure was normal. There was normal leaflet separation. There was no echocardiographic evidence of vegetation. DOPPLER: There was no regurgitation.    AORTIC VALVE: The valve was trileaflet. Leaflets exhibited normal thickness and normal cuspal separation. There was no echocardiographic evidence of vegetation. DOPPLER: There was no regurgitation.    TRICUSPID VALVE: The valve structure was normal. There was normal leaflet separation. There was no echocardiographic evidence of vegetation. DOPPLER: There was no regurgitation.    PERICARDIUM: There was no pericardial effusion. The pericardium was normal in appearance.    [facility] Accredited Echocardiography Laboratory    Prepared and electronically signed by    Hermilo Lopez MD  Signed 22-May-2020 09:25:57    No results found for this or any previous visit.    No valid procedures specified.  No results found for this or any previous visit.      Meds/Allergies   all current active meds have been reviewed and current meds:   Current Facility-Administered Medications   Medication Dose Route Frequency    bumetanide (BUMEX) injection 2 mg  2 mg Intravenous BID    ferrous gluconate (FERGON) tablet 324 mg  324 mg Oral Once per day on Monday Wednesday Friday    HYDROmorphone (DILAUDID) injection 0.5 mg  0.5 mg Intravenous Q2H PRN    metoprolol succinate (TOPROL-XL) 24 hr tablet 50 mg "  50 mg Oral Daily    nicotine (NICODERM CQ) 7 mg/24hr TD 24 hr patch 1 patch  1 patch Transdermal Daily    nystatin (MYCOSTATIN) powder   Topical Daily    oxyCODONE (ROXICODONE) IR tablet 5 mg  5 mg Oral Q4H PRN    Or    oxyCODONE (ROXICODONE) split tablet 7.5 mg  7.5 mg Oral Q4H PRN    pantoprazole (PROTONIX) EC tablet 40 mg  40 mg Oral Daily    polyethylene glycol (MIRALAX) packet 17 g  17 g Oral Daily        Assessment:  Principal Problem:    Chest pain  Active Problems:    Gastroesophageal reflux disease     History of monomorphic ventricular tachycardia, s/p ICD in 2016    Stage 4 chronic kidney disease (HCC)    Paroxysmal A-fib (HCC)    Right renal stone    HTN (hypertension)    Elevated troponin    Abdominal pain    CHF, acute on chronic (HCC)    Fungal skin disease    On prednisone therapy    BRBPR (bright red blood per rectum)      Counseling / Coordination of Care  Total floor / unit time spent today 20 minutes.  Greater than 50% of total time was spent with the patient and / or family counseling and / or coordination of care.      ** Please Note: Dragon 360 Dictation voice to text software may have been used in the creation of this document. **

## 2024-02-20 NOTE — INCIDENTAL FINDINGS
The following findings require follow up:  Radiographic finding   Finding: 3 mm nonobstructing right renal calculus. No hydronephrosis. Left perinephric stranding is similar but otherwise limited in evaluation without contrast    Follow up required: Non-urgent, can follow up with PCP within 1 week. Can consider repeat CT around that time   Follow up with PCP should be done within 1 week(s)    Please notify the following clinician to assist with the follow up:   Dr. Fredis Esquivel, DO

## 2024-02-20 NOTE — ASSESSMENT & PLAN NOTE
Patient states she has been taking prednisone 50 mg for about a month. Although an unclear history due to poor historian, she was prescribed it by her outpatient GI after an attempt at a colonoscopy in January. She will be following up with them soon in order to get another one as the first attempt was insufficient.   -After calling patient's pharmacy 2/19, we learned that the prednisone was a short 5 day course due to plantar fasciitis. Patient has not filled since.  Plan:  - Stopped prednisone

## 2024-02-20 NOTE — DISCHARGE INSTR - AVS FIRST PAGE
Dear Lupe Menjivar,     It was our pleasure to care for you here at [facility].  It is our hope that we were always able to exceed the expected standards for your care during your stay.  You were hospitalized due to chest pain.  You were cared for on the 3rd floor by Nenita Fofana DO under the service of Jose Lopez MD with the [facility] who covers for your primary care physician (PCP), Rhina Pettit MD, while you were hospitalized.  If you have any questions or concerns related to this hospitalization, you may contact us at .  For follow up as well as any medication refills, we recommend that you follow up with your primary care physician.  A registered nurse will reach out to you by phone within a few days after your discharge to answer any additional questions that you may have after going home.  However, at this time we provide for you here, the most important instructions / recommendations at discharge:     Notable Medication Adjustments -   Start oral Bumex 2 mg twice a day for heart failure control  Start potassium chloride 20 mEq daily, as Bumex can lower potassium.   Restart your Xarelto upon discharge  Start miralax 17 gram daily for constipation  Ensure you take your metolazone at least 45 minutes before taking your morning Bumex. You will take the metolazone as needed when you notice a weight gain of 2-3 lbs in 24 hours.   Testing Required after Discharge -   CMP in 1 week  ** Please contact your PCP to request testing orders for any of the testing recommended here **  Important follow up information -   Follow up with cardiology on 2/23, appointment scheduled  Follow up with primary care provider in 1 week, you will need a blood work test rechecked, called a CMP  Other Instructions -   The miralax will help with constipation and the left lower quadrant pain, take as needed to help with that pain when it flares up. Could  also try colace and suppositories since you have a hemorrhoid mostly likely as the cause of your bleeding. Follow medication adjustments as above for your congestive heart failure.   Please review this entire after visit summary as additional general instructions including medication list, appointments, activity, diet, any pertinent wound care, and other additional recommendations from your care team that may be provided for you.      Sincerely,     Nenita Fofana, DO

## 2024-02-20 NOTE — PLAN OF CARE
Problem: PAIN - ADULT  Goal: Verbalizes/displays adequate comfort level or baseline comfort level  Description: Interventions:  - Encourage patient to monitor pain and request assistance  - Assess pain using appropriate pain scale  - Administer analgesics based on type and severity of pain and evaluate response  - Implement non-pharmacological measures as appropriate and evaluate response  - Consider cultural and social influences on pain and pain management  - Notify physician/advanced practitioner if interventions unsuccessful or patient reports new pain  Outcome: Progressing     Problem: INFECTION - ADULT  Goal: Absence or prevention of progression during hospitalization  Description: INTERVENTIONS:  - Assess and monitor for signs and symptoms of infection  - Monitor lab/diagnostic results  - Monitor all insertion sites, i.e. indwelling lines, tubes, and drains  - Monitor endotracheal if appropriate and nasal secretions for changes in amount and color  - [place] appropriate cooling/warming therapies per order  - Administer medications as ordered  - Instruct and encourage patient and family to use good hand hygiene technique  - Identify and instruct in appropriate isolation precautions for identified infection/condition  Outcome: Progressing  Goal: Absence of fever/infection during neutropenic period  Description: INTERVENTIONS:  - Monitor WBC    Outcome: Progressing     Problem: SAFETY ADULT  Goal: Patient will remain free of falls  Description: INTERVENTIONS:  - Educate patient/family on patient safety including physical limitations  - Instruct patient to call for assistance with activity   - Consult OT/PT to assist with strengthening/mobility   - Keep Call bell within reach  - Keep bed low and locked with side rails adjusted as appropriate  - Keep care items and personal belongings within reach  - Initiate and maintain comfort rounds  - Make Fall Risk Sign visible to staff  - Apply yellow socks and bracelet  for high fall risk patients  - Consider moving patient to room near nurses station  Outcome: Progressing  Goal: Maintain or return to baseline ADL function  Description: INTERVENTIONS:  -  Assess patient's ability to carry out ADLs; assess patient's baseline for ADL function and identify physical deficits which impact ability to perform ADLs (bathing, care of mouth/teeth, toileting, grooming, dressing, etc.)  - Assess/evaluate cause of self-care deficits   - Assess range of motion  - Assess patient's mobility; develop plan if impaired  - Assess patient's need for assistive devices and provide as appropriate  - Encourage maximum independence but intervene and supervise when necessary  - Involve family in performance of ADLs  - Assess for home care needs following discharge   - Consider OT consult to assist with ADL evaluation and planning for discharge  - Provide patient education as appropriate  Outcome: Progressing  Goal: Maintains/Returns to pre admission functional level  Description: INTERVENTIONS:  - Perform AM-PAC 6 Click Basic Mobility/ Daily Activity assessment daily.  - Set and communicate daily mobility goal to care team and patient/family/caregiver.   - Collaborate with rehabilitation services on mobility goals if consulted  - Out of bed to chair 2 times a day   - Out of bed for meals 2 times a day  - Out of bed for toileting  - Record patient progress and toleration of activity level   Outcome: Progressing     Problem: DISCHARGE PLANNING  Goal: Discharge to home or other facility with appropriate resources  Description: INTERVENTIONS:  - Identify barriers to discharge w/patient and caregiver  - Arrange for needed discharge resources and transportation as appropriate  - Identify discharge learning needs (meds, wound care, etc.)  - Arrange for interpretive services to assist at discharge as needed  - Refer to Case Management Department for coordinating discharge planning if the patient needs post-hospital  services based on physician/advanced practitioner order or complex needs related to functional status, cognitive ability, or social support system  Outcome: Progressing     Problem: Knowledge Deficit  Goal: Patient/family/caregiver demonstrates understanding of disease process, treatment plan, medications, and discharge instructions  Description: Complete learning assessment and assess knowledge base.  Interventions:  - Provide teaching at level of understanding  - Provide teaching via preferred learning methods  Outcome: Progressing     Problem: GASTROINTESTINAL - ADULT  Goal: Minimal or absence of nausea and/or vomiting  Description: INTERVENTIONS:  - Administer IV fluids if ordered to ensure adequate hydration  - Maintain NPO status until nausea and vomiting are resolved  - Nasogastric tube if ordered  - Administer ordered antiemetic medications as needed  - Provide nonpharmacologic comfort measures as appropriate  - Advance diet as tolerated, if ordered  - Consider nutrition services referral to assist patient with adequate nutrition and appropriate food choices  Outcome: Progressing  Goal: Maintains or returns to baseline bowel function  Description: INTERVENTIONS:  - Assess bowel function  - Encourage oral fluids to ensure adequate hydration  - Administer IV fluids if ordered to ensure adequate hydration  - Administer ordered medications as needed  - Encourage mobilization and activity  - Consider nutritional services referral to assist patient with adequate nutrition and appropriate food choices  Outcome: Progressing  Goal: Maintains adequate nutritional intake  Description: INTERVENTIONS:  - Monitor percentage of each meal consumed  - Identify factors contributing to decreased intake, treat as appropriate  - Assist with meals as needed  - Monitor I&O, weight, and lab values if indicated  - Obtain nutrition services referral as needed  Outcome: Progressing  Goal: Establish and maintain optimal ostomy  function  Description: INTERVENTIONS:  - Assess bowel function  - Encourage oral fluids to ensure adequate hydration  - Administer IV fluids if ordered to ensure adequate hydration   - Administer ordered medications as needed  - Encourage mobilization and activity  - Nutrition services referral to assist patient with appropriate food choices  - Assess stoma site  - Consider wound care consult   Outcome: Progressing  Goal: Oral mucous membranes remain intact  Description: INTERVENTIONS  - Assess oral mucosa and hygiene practices  - Implement preventative oral hygiene regimen  - Implement oral medicated treatments as ordered  - Initiate Nutrition services referral as needed  Outcome: Progressing     Problem: CARDIOVASCULAR - ADULT  Goal: Maintains optimal cardiac output and hemodynamic stability  Description: INTERVENTIONS:  - Monitor I/O, vital signs and rhythm  - Monitor for S/S and trends of decreased cardiac output  - Administer and titrate ordered vasoactive medications to optimize hemodynamic stability  - Assess quality of pulses, skin color and temperature  - Assess for signs of decreased coronary artery perfusion  - Instruct patient to report change in severity of symptoms  Outcome: Progressing  Goal: Absence of cardiac dysrhythmias or at baseline rhythm  Description: INTERVENTIONS:  - Continuous cardiac monitoring, vital signs, obtain 12 lead EKG if ordered  - Administer antiarrhythmic and heart rate control medications as ordered  - Monitor electrolytes and administer replacement therapy as ordered  Outcome: Progressing

## 2024-02-21 DIAGNOSIS — I50.33 ACUTE ON CHRONIC DIASTOLIC (CONGESTIVE) HEART FAILURE (HCC): ICD-10-CM

## 2024-02-21 RX ORDER — METOPROLOL SUCCINATE 50 MG/1
50 TABLET, EXTENDED RELEASE ORAL DAILY
Qty: 30 TABLET | Refills: 3 | Status: SHIPPED | OUTPATIENT
Start: 2024-02-21

## 2024-03-21 DIAGNOSIS — R11.2 NAUSEA AND VOMITING, UNSPECIFIED VOMITING TYPE: ICD-10-CM

## 2024-03-21 RX ORDER — PANTOPRAZOLE SODIUM 40 MG/1
40 TABLET, DELAYED RELEASE ORAL DAILY
Qty: 30 TABLET | Refills: 5 | Status: SHIPPED | OUTPATIENT
Start: 2024-03-21

## 2024-03-31 ENCOUNTER — APPOINTMENT (EMERGENCY)
Dept: RADIOLOGY | Facility: HOSPITAL | Age: 59
DRG: 194 | End: 2024-03-31
Payer: COMMERCIAL

## 2024-03-31 ENCOUNTER — HOSPITAL ENCOUNTER (INPATIENT)
Facility: HOSPITAL | Age: 59
LOS: 1 days | Discharge: LEFT BEFORE DISCHARGE | DRG: 194 | End: 2024-04-01
Attending: EMERGENCY MEDICINE | Admitting: INTERNAL MEDICINE
Payer: COMMERCIAL

## 2024-03-31 DIAGNOSIS — I50.9 CHF EXACERBATION (HCC): Primary | ICD-10-CM

## 2024-03-31 DIAGNOSIS — R79.89 ELEVATED TROPONIN: ICD-10-CM

## 2024-03-31 DIAGNOSIS — Z59.00 HOMELESS: ICD-10-CM

## 2024-03-31 DIAGNOSIS — R07.9 CHEST PAIN: ICD-10-CM

## 2024-03-31 PROBLEM — I48.0 PAROXYSMAL A-FIB (HCC): Status: RESOLVED | Noted: 2020-05-14 | Resolved: 2024-03-31

## 2024-03-31 PROBLEM — D69.6 THROMBOCYTOPENIA (HCC): Status: RESOLVED | Noted: 2023-07-23 | Resolved: 2024-03-31

## 2024-03-31 LAB
2HR DELTA HS TROPONIN: 8 NG/L
4HR DELTA HS TROPONIN: -1 NG/L
AMPHETAMINES SERPL QL SCN: NEGATIVE
ANION GAP SERPL CALCULATED.3IONS-SCNC: 9 MMOL/L (ref 4–13)
BARBITURATES UR QL: NEGATIVE
BASOPHILS # BLD AUTO: 0.03 THOUSANDS/ÂΜL (ref 0–0.1)
BASOPHILS NFR BLD AUTO: 1 % (ref 0–1)
BENZODIAZ UR QL: NEGATIVE
BNP SERPL-MCNC: 761 PG/ML (ref 0–100)
BUN SERPL-MCNC: 17 MG/DL (ref 5–25)
CALCIUM SERPL-MCNC: 9.2 MG/DL (ref 8.4–10.2)
CARDIAC TROPONIN I PNL SERPL HS: 54 NG/L
CARDIAC TROPONIN I PNL SERPL HS: 55 NG/L
CARDIAC TROPONIN I PNL SERPL HS: 63 NG/L
CHLORIDE SERPL-SCNC: 99 MMOL/L (ref 96–108)
CO2 SERPL-SCNC: 29 MMOL/L (ref 21–32)
COCAINE UR QL: POSITIVE
CREAT SERPL-MCNC: 1.54 MG/DL (ref 0.6–1.3)
EOSINOPHIL # BLD AUTO: 0.11 THOUSAND/ÂΜL (ref 0–0.61)
EOSINOPHIL NFR BLD AUTO: 2 % (ref 0–6)
ERYTHROCYTE [DISTWIDTH] IN BLOOD BY AUTOMATED COUNT: 14.3 % (ref 11.6–15.1)
FLUAV RNA RESP QL NAA+PROBE: NEGATIVE
FLUBV RNA RESP QL NAA+PROBE: NEGATIVE
GFR SERPL CREATININE-BSD FRML MDRD: 36 ML/MIN/1.73SQ M
GLUCOSE SERPL-MCNC: 88 MG/DL (ref 65–140)
HCT VFR BLD AUTO: 43.6 % (ref 34.8–46.1)
HGB BLD-MCNC: 13.4 G/DL (ref 11.5–15.4)
IMM GRANULOCYTES # BLD AUTO: 0.02 THOUSAND/UL (ref 0–0.2)
IMM GRANULOCYTES NFR BLD AUTO: 0 % (ref 0–2)
LYMPHOCYTES # BLD AUTO: 1.51 THOUSANDS/ÂΜL (ref 0.6–4.47)
LYMPHOCYTES NFR BLD AUTO: 23 % (ref 14–44)
MCH RBC QN AUTO: 26.1 PG (ref 26.8–34.3)
MCHC RBC AUTO-ENTMCNC: 30.7 G/DL (ref 31.4–37.4)
MCV RBC AUTO: 85 FL (ref 82–98)
METHADONE UR QL: NEGATIVE
MONOCYTES # BLD AUTO: 0.39 THOUSAND/ÂΜL (ref 0.17–1.22)
MONOCYTES NFR BLD AUTO: 6 % (ref 4–12)
NEUTROPHILS # BLD AUTO: 4.41 THOUSANDS/ÂΜL (ref 1.85–7.62)
NEUTS SEG NFR BLD AUTO: 68 % (ref 43–75)
NRBC BLD AUTO-RTO: 0 /100 WBCS
OPIATES UR QL SCN: NEGATIVE
OXYCODONE+OXYMORPHONE UR QL SCN: POSITIVE
PCP UR QL: NEGATIVE
PLATELET # BLD AUTO: 181 THOUSANDS/UL (ref 149–390)
PMV BLD AUTO: 12 FL (ref 8.9–12.7)
POTASSIUM SERPL-SCNC: 3.8 MMOL/L (ref 3.5–5.3)
PROCALCITONIN SERPL-MCNC: <0.05 NG/ML
RBC # BLD AUTO: 5.13 MILLION/UL (ref 3.81–5.12)
RSV RNA RESP QL NAA+PROBE: NEGATIVE
SARS-COV-2 RNA RESP QL NAA+PROBE: NEGATIVE
SODIUM SERPL-SCNC: 137 MMOL/L (ref 135–147)
THC UR QL: NEGATIVE
WBC # BLD AUTO: 6.47 THOUSAND/UL (ref 4.31–10.16)

## 2024-03-31 PROCEDURE — 83880 ASSAY OF NATRIURETIC PEPTIDE: CPT

## 2024-03-31 PROCEDURE — 93005 ELECTROCARDIOGRAM TRACING: CPT

## 2024-03-31 PROCEDURE — 71046 X-RAY EXAM CHEST 2 VIEWS: CPT

## 2024-03-31 PROCEDURE — 96374 THER/PROPH/DIAG INJ IV PUSH: CPT

## 2024-03-31 PROCEDURE — 80307 DRUG TEST PRSMV CHEM ANLYZR: CPT | Performed by: FAMILY MEDICINE

## 2024-03-31 PROCEDURE — 99285 EMERGENCY DEPT VISIT HI MDM: CPT | Performed by: EMERGENCY MEDICINE

## 2024-03-31 PROCEDURE — 36415 COLL VENOUS BLD VENIPUNCTURE: CPT

## 2024-03-31 PROCEDURE — 84145 PROCALCITONIN (PCT): CPT | Performed by: FAMILY MEDICINE

## 2024-03-31 PROCEDURE — 85025 COMPLETE CBC W/AUTO DIFF WBC: CPT

## 2024-03-31 PROCEDURE — 99223 1ST HOSP IP/OBS HIGH 75: CPT | Performed by: FAMILY MEDICINE

## 2024-03-31 PROCEDURE — 84484 ASSAY OF TROPONIN QUANT: CPT

## 2024-03-31 PROCEDURE — 0241U HB NFCT DS VIR RESP RNA 4 TRGT: CPT | Performed by: FAMILY MEDICINE

## 2024-03-31 PROCEDURE — 99285 EMERGENCY DEPT VISIT HI MDM: CPT

## 2024-03-31 PROCEDURE — 80048 BASIC METABOLIC PNL TOTAL CA: CPT

## 2024-03-31 RX ORDER — POTASSIUM CHLORIDE 20 MEQ/1
20 TABLET, EXTENDED RELEASE ORAL DAILY
Status: DISCONTINUED | OUTPATIENT
Start: 2024-04-01 | End: 2024-04-01 | Stop reason: HOSPADM

## 2024-03-31 RX ORDER — SODIUM CHLORIDE 9 MG/ML
3 INJECTION INTRAVENOUS
Status: DISCONTINUED | OUTPATIENT
Start: 2024-03-31 | End: 2024-04-01 | Stop reason: HOSPADM

## 2024-03-31 RX ORDER — FUROSEMIDE 10 MG/ML
100 INJECTION INTRAMUSCULAR; INTRAVENOUS 2 TIMES DAILY
Status: DISCONTINUED | OUTPATIENT
Start: 2024-03-31 | End: 2024-04-01 | Stop reason: HOSPADM

## 2024-03-31 RX ORDER — TRAMADOL HYDROCHLORIDE 50 MG/1
50 TABLET ORAL ONCE
Status: COMPLETED | OUTPATIENT
Start: 2024-03-31 | End: 2024-03-31

## 2024-03-31 RX ORDER — AMLODIPINE BESYLATE 5 MG/1
5 TABLET ORAL DAILY
Status: DISCONTINUED | OUTPATIENT
Start: 2024-03-31 | End: 2024-04-01 | Stop reason: HOSPADM

## 2024-03-31 RX ORDER — PANTOPRAZOLE SODIUM 40 MG/1
40 TABLET, DELAYED RELEASE ORAL DAILY
Status: DISCONTINUED | OUTPATIENT
Start: 2024-04-01 | End: 2024-04-01 | Stop reason: HOSPADM

## 2024-03-31 RX ORDER — FUROSEMIDE 10 MG/ML
60 INJECTION INTRAMUSCULAR; INTRAVENOUS ONCE
Status: COMPLETED | OUTPATIENT
Start: 2024-03-31 | End: 2024-03-31

## 2024-03-31 RX ADMIN — FUROSEMIDE 60 MG: 10 INJECTION, SOLUTION INTRAMUSCULAR; INTRAVENOUS at 13:53

## 2024-03-31 RX ADMIN — FUROSEMIDE 100 MG: 10 INJECTION, SOLUTION INTRAMUSCULAR; INTRAVENOUS at 18:11

## 2024-03-31 RX ADMIN — AMLODIPINE BESYLATE 5 MG: 5 TABLET ORAL at 14:46

## 2024-03-31 RX ADMIN — TRAMADOL HYDROCHLORIDE 50 MG: 50 TABLET, COATED ORAL at 12:51

## 2024-03-31 SDOH — ECONOMIC STABILITY - HOUSING INSECURITY: HOMELESSNESS UNSPECIFIED: Z59.00

## 2024-03-31 NOTE — ED ATTENDING ATTESTATION
3/31/2024  I, Eduard Cooper MD, saw and evaluated the patient. I have discussed the patient with the resident/non-physician practitioner and agree with the resident's/non-physician practitioner's findings, Plan of Care, and MDM as documented in the resident's/non-physician practitioner's note, except where noted. All available labs and Radiology studies were reviewed.  I was present for key portions of any procedure(s) performed by the resident/non-physician practitioner and I was immediately available to provide assistance.       At this point I agree with the current assessment done in the Emergency Department.  I have conducted an independent evaluation of this patient a history and physical is as follows:    ED Course     59-year-old female, history of CHF, ischemic cardiomyopathy, paced/AICD, presenting to the emergency department for evaluation of left-sided chest pressure starting last night associated with bilateral lower extremity edema and some worsening shortness of breath.  Patient with orthopnea.  No fever.  No abdominal pain, nausea, vomiting, diarrhea.    The patient is resting comfortably on a stretcher in no acute respiratory distress. The patient appears nontoxic. HEENT reveals moist mucous membranes. Head is normocephalic and atraumatic. Conjunctiva and sclera are normal. Neck is nontender and supple with full range of motion to flexion, extension, lateral rotation. No meningismus appreciated. No masses are appreciated. Lungs are clear to auscultation bilaterally without any wheezes, rales or rhonchi. Heart is regular rate and rhythm without any murmurs, rubs or gallops. Abdomen is soft and nontender without any rebound or guarding. Extremities appear grossly normal without any significant arthropathy. Patient is awake, alert, and oriented x3. The patient has normal interaction.  Cranial nerves grossly intact.  Motor is 5 out of 5 bilateral upper and lower extremities.    MEDICAL DECISION  MAKING    Number and Complexity of Problems  Differential diagnosis: Congestive heart failure, anemia, acute coronary syndrome, COPD, pneumonia.    Medical Decision Making Data  External documents reviewed: Reviewed last hospitalization from 2/18/2024.  Patient admitted for acute on chronic CHF exacerbation treated with IV Bumex and discharged with metolazone.  My EKG interpretation: Dual-chamber paced.  My X-ray interpretation: No acute cardiopulmonary disease.    X-ray chest 2 views   ED Interpretation   No acute cardiopulmonary abnormality.          Labs Reviewed   CBC AND DIFFERENTIAL - Abnormal       Result Value Ref Range Status    WBC 6.47  4.31 - 10.16 Thousand/uL Final    RBC 5.13 (*) 3.81 - 5.12 Million/uL Final    Hemoglobin 13.4  11.5 - 15.4 g/dL Final    Hematocrit 43.6  34.8 - 46.1 % Final    MCV 85  82 - 98 fL Final    MCH 26.1 (*) 26.8 - 34.3 pg Final    MCHC 30.7 (*) 31.4 - 37.4 g/dL Final    RDW 14.3  11.6 - 15.1 % Final    MPV 12.0  8.9 - 12.7 fL Final    Platelets 181  149 - 390 Thousands/uL Final    nRBC 0  /100 WBCs Final    Neutrophils Relative 68  43 - 75 % Final    Immature Grans % 0  0 - 2 % Final    Lymphocytes Relative 23  14 - 44 % Final    Monocytes Relative 6  4 - 12 % Final    Eosinophils Relative 2  0 - 6 % Final    Basophils Relative 1  0 - 1 % Final    Neutrophils Absolute 4.41  1.85 - 7.62 Thousands/µL Final    Absolute Immature Grans 0.02  0.00 - 0.20 Thousand/uL Final    Absolute Lymphocytes 1.51  0.60 - 4.47 Thousands/µL Final    Absolute Monocytes 0.39  0.17 - 1.22 Thousand/µL Final    Eosinophils Absolute 0.11  0.00 - 0.61 Thousand/µL Final    Basophils Absolute 0.03  0.00 - 0.10 Thousands/µL Final   BASIC METABOLIC PANEL - Abnormal    Sodium 137  135 - 147 mmol/L Final    Potassium 3.8  3.5 - 5.3 mmol/L Final    Chloride 99  96 - 108 mmol/L Final    CO2 29  21 - 32 mmol/L Final    ANION GAP 9  4 - 13 mmol/L Final    BUN 17  5 - 25 mg/dL Final    Creatinine 1.54 (*) 0.60 -  "1.30 mg/dL Final    Comment: Standardized to IDMS reference method    Glucose 88  65 - 140 mg/dL Final    Comment: If the patient is fasting, the ADA then defines impaired fasting glucose as > 100 mg/dL and diabetes as > or equal to 123 mg/dL.    Calcium 9.2  8.4 - 10.2 mg/dL Final    eGFR 36  ml/min/1.73sq m Final    Narrative:     National Kid[employer] guidelines for Chronic Kidney Disease (CKD):     Stage 1 with normal or high GFR (GFR > 90 mL/min/1.73 square meters)    Stage 2 Mild CKD (GFR = 60-89 mL/min/1.73 square meters)    Stage 3A Moderate CKD (GFR = 45-59 mL/min/1.73 square meters)    Stage 3B Moderate CKD (GFR = 30-44 mL/min/1.73 square meters)    Stage 4 Severe CKD (GFR = 15-29 mL/min/1.73 square meters)    Stage 5 End Stage CKD (GFR <15 mL/min/1.73 square meters)  Note: GFR calculation is accurate only with a steady state creatinine   HS TROPONIN I 0HR - Abnormal    hs TnI 0hr 55 (*) \"Refer to ACS Flowchart\"- see link ng/L Final    Comment:                                              Initial (time 0) result  If >=50 ng/L, Myocardial injury suggested ;  Type of myocardial injury and treatment strategy  to be determined.  If 5-49 ng/L, a delta result at 2 hours and or 4 hours will be needed to further evaluate.  If <4 ng/L, and chest pain has been >3 hours since onset, patient may qualify for discharge based on the HEART score in the ED.  If <5 ng/L and <3hours since onset of chest pain, a delta result at 2 hours will be needed to further evaluate.    HS Troponin 99th Percentile URL of a Health Population=12 ng/L with a 95% Confidence Interval of 8-18 ng/L.    Second Troponin (time 2 hours)  If calculated delta >= 20 ng/L,  Myocardial injury suggested ; Type of myocardial injury and treatment strategy to be determined.  If 5-49 ng/L and the calculated delta is 5-19 ng/L, consult medical service for evaluation.  Continue evaluation for ischemia on ecg and other possible etiology and repeat " hs troponin at 4 hours.  If delta is <5 ng/L at 2 hours, consider discharge based on risk stratification via the HEART score (if in ED), or BRITTA risk score in IP/Observation.    HS Troponin 99th Percentile URL of a Health Population=12 ng/L with a 95% Confidence Interval of 8-18 ng/L.   B-TYPE NATRIURETIC PEPTIDE (BNP) - Abnormal     (*) 0 - 100 pg/mL Final   HS TROPONIN I 2HR       Labs reviewed by me are significant for:  Creatinine one 1.54 is down from last creatinine of 1.92.  BNP stable at 761.    Clinical decision rules/scores are significant for: Heart score of 6.    Discussed case with: Hospitalist.  Considered admission for: Acute on chronic CHF exacerbation.    Treatment and Disposition  ED course: Patient remained hemodynamically stable in the emergency department.  Patient underwent evaluation with lab work, chest x-ray, EKG which were reviewed by me.  Patient noted to be hypertensive while she was in the emergency department, with mild resolution of hypertension while she was here.  Patient will be admitted for further management with cardiology.  Shared decision making: Patient agreeable with plan.  Code status: Full code.              Critical Care Time  Procedures

## 2024-03-31 NOTE — ASSESSMENT & PLAN NOTE
Initial troponin 55 may be multifactorial  Trend troponin  Reviewed EKG-no significant ST-T wave changes from last EKG done in February 18.  Ventricularly paced.  Follow-up UDS given significant illicit drug history  Review chest x-ray there may be some right lower lobe consolidation.  Not consistent with a CHF exacerbation.  Pending official read  Follow-up procalcitonin-may be skewed given renal function

## 2024-03-31 NOTE — ASSESSMENT & PLAN NOTE
Wt Readings from Last 3 Encounters:   02/20/24 98.2 kg (216 lb 7.9 oz)   02/09/24 99.1 kg (218 lb 6.4 oz)   02/05/24 97.1 kg (214 lb 1.1 oz)     Patient presented with shortness of breath and states that she is 8 pounds over her dry weight.  BNP is 761.  Chest x-ray not very consistent with CHF exacerbation however patient does have crackles on inspiration.  Patient takes Bumex 2 mg twice daily will start patient on 100 mg of IV furosemide twice daily per CHF order set  Strict I's and O's  Daily weights

## 2024-03-31 NOTE — ASSESSMENT & PLAN NOTE
Lab Results   Component Value Date    EGFR 36 03/31/2024    EGFR 28 02/20/2024    EGFR 31 02/19/2024    CREATININE 1.54 (H) 03/31/2024    CREATININE 1.92 (H) 02/20/2024    CREATININE 1.77 (H) 02/19/2024     Patient is at baseline  Closely monitor renal function as patient is being diuresed
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
## 2024-03-31 NOTE — ASSESSMENT & PLAN NOTE
Patient has a pacemaker in place  Restart metoprolol close UDS comes back negative for cocaine  Continue Xarelto
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
## 2024-03-31 NOTE — ASSESSMENT & PLAN NOTE
Blood pressure not at goal  Patient has metoprolol  Patient has a history of cocaine abuse  Follow-up UDS  Avoid ACE/ARB and thiazide diuretic given renal function  Avoid beta-blockers until UDS is negative for cocaine  Avoid hydralazine given chest pain until ACS is ruled out  We will start patient on amlodipine 5 mg

## 2024-04-01 VITALS
TEMPERATURE: 97.6 F | WEIGHT: 219.6 LBS | OXYGEN SATURATION: 96 % | BODY MASS INDEX: 33.89 KG/M2 | DIASTOLIC BLOOD PRESSURE: 80 MMHG | HEART RATE: 61 BPM | RESPIRATION RATE: 18 BRPM | SYSTOLIC BLOOD PRESSURE: 142 MMHG

## 2024-04-01 LAB
ALBUMIN SERPL BCP-MCNC: 3.6 G/DL (ref 3.5–5)
ALP SERPL-CCNC: 64 U/L (ref 34–104)
ALT SERPL W P-5'-P-CCNC: 5 U/L (ref 7–52)
ANION GAP SERPL CALCULATED.3IONS-SCNC: 9 MMOL/L (ref 4–13)
AST SERPL W P-5'-P-CCNC: 12 U/L (ref 13–39)
ATRIAL RATE: 71 BPM
BASOPHILS # BLD AUTO: 0.01 THOUSANDS/ÂΜL (ref 0–0.1)
BASOPHILS NFR BLD AUTO: 0 % (ref 0–1)
BILIRUB SERPL-MCNC: 0.51 MG/DL (ref 0.2–1)
BUN SERPL-MCNC: 21 MG/DL (ref 5–25)
CALCIUM SERPL-MCNC: 8.8 MG/DL (ref 8.4–10.2)
CHLORIDE SERPL-SCNC: 100 MMOL/L (ref 96–108)
CO2 SERPL-SCNC: 31 MMOL/L (ref 21–32)
CREAT SERPL-MCNC: 1.58 MG/DL (ref 0.6–1.3)
EOSINOPHIL # BLD AUTO: 0.07 THOUSAND/ÂΜL (ref 0–0.61)
EOSINOPHIL NFR BLD AUTO: 2 % (ref 0–6)
ERYTHROCYTE [DISTWIDTH] IN BLOOD BY AUTOMATED COUNT: 14.4 % (ref 11.6–15.1)
GFR SERPL CREATININE-BSD FRML MDRD: 35 ML/MIN/1.73SQ M
GLUCOSE SERPL-MCNC: 87 MG/DL (ref 65–140)
HCT VFR BLD AUTO: 38.3 % (ref 34.8–46.1)
HGB BLD-MCNC: 11.9 G/DL (ref 11.5–15.4)
IMM GRANULOCYTES # BLD AUTO: 0.02 THOUSAND/UL (ref 0–0.2)
IMM GRANULOCYTES NFR BLD AUTO: 0 % (ref 0–2)
LYMPHOCYTES # BLD AUTO: 1.13 THOUSANDS/ÂΜL (ref 0.6–4.47)
LYMPHOCYTES NFR BLD AUTO: 25 % (ref 14–44)
MCH RBC QN AUTO: 26.2 PG (ref 26.8–34.3)
MCHC RBC AUTO-ENTMCNC: 31.1 G/DL (ref 31.4–37.4)
MCV RBC AUTO: 84 FL (ref 82–98)
MONOCYTES # BLD AUTO: 0.34 THOUSAND/ÂΜL (ref 0.17–1.22)
MONOCYTES NFR BLD AUTO: 8 % (ref 4–12)
NEUTROPHILS # BLD AUTO: 2.97 THOUSANDS/ÂΜL (ref 1.85–7.62)
NEUTS SEG NFR BLD AUTO: 65 % (ref 43–75)
NRBC BLD AUTO-RTO: 0 /100 WBCS
P AXIS: 71 DEGREES
PLATELET # BLD AUTO: 123 THOUSANDS/UL (ref 149–390)
PMV BLD AUTO: 11.3 FL (ref 8.9–12.7)
POTASSIUM SERPL-SCNC: 3.6 MMOL/L (ref 3.5–5.3)
PR INTERVAL: 188 MS
PROT SERPL-MCNC: 6.9 G/DL (ref 6.4–8.4)
QRS AXIS: -71 DEGREES
QRSD INTERVAL: 188 MS
QT INTERVAL: 490 MS
QTC INTERVAL: 532 MS
RBC # BLD AUTO: 4.54 MILLION/UL (ref 3.81–5.12)
SODIUM SERPL-SCNC: 140 MMOL/L (ref 135–147)
T WAVE AXIS: 101 DEGREES
VENTRICULAR RATE: 71 BPM
WBC # BLD AUTO: 4.54 THOUSAND/UL (ref 4.31–10.16)

## 2024-04-01 PROCEDURE — 99238 HOSP IP/OBS DSCHRG MGMT 30/<: CPT | Performed by: INTERNAL MEDICINE

## 2024-04-01 PROCEDURE — 36415 COLL VENOUS BLD VENIPUNCTURE: CPT | Performed by: FAMILY MEDICINE

## 2024-04-01 PROCEDURE — 85025 COMPLETE CBC W/AUTO DIFF WBC: CPT | Performed by: FAMILY MEDICINE

## 2024-04-01 PROCEDURE — 93010 ELECTROCARDIOGRAM REPORT: CPT | Performed by: INTERNAL MEDICINE

## 2024-04-01 PROCEDURE — 80053 COMPREHEN METABOLIC PANEL: CPT | Performed by: FAMILY MEDICINE

## 2024-04-01 RX ADMIN — PANTOPRAZOLE SODIUM 40 MG: 40 TABLET, DELAYED RELEASE ORAL at 09:27

## 2024-04-01 RX ADMIN — AMLODIPINE BESYLATE 5 MG: 5 TABLET ORAL at 09:27

## 2024-04-01 RX ADMIN — POTASSIUM CHLORIDE 20 MEQ: 1500 TABLET, EXTENDED RELEASE ORAL at 09:27

## 2024-04-01 RX ADMIN — FUROSEMIDE 100 MG: 10 INJECTION, SOLUTION INTRAMUSCULAR; INTRAVENOUS at 09:27

## 2024-04-01 NOTE — DISCHARGE SUMMARY
[facility]  Discharge- Lupe Menjivar 1965, 59 y.o. female MRN: [id number]  Unit/Bed#: -01 Encounter: [id number]  Primary Care Provider: Rhina Pettit MD   Date and time admitted to hospital: 3/31/2024 11:44 AM    Homeless  Assessment & Plan  Patient states that she has a house that she is living with her     HTN (hypertension)  Assessment & Plan  Blood pressure not at goal on arrival   Improved as of today   Patient wanted to leave against medical advice immediately after reaching the hospital floor from the ED- she signed the against medical advice paperwork and left hospital with no change to her medications, however she did say that she would follow up with her primary care doctor.     Cocaine abuse (HCC)  Assessment & Plan  Patient left AMA, signed paperwork     Mild acute on chronic diastolic congestive heart failure (HCC)  Assessment & Plan  Wt Readings from Last 3 Encounters:   04/01/24 99.6 kg (219 lb 9.6 oz)   02/20/24 98.2 kg (216 lb 7.9 oz)   02/09/24 99.1 kg (218 lb 6.4 oz)     Patient presented with shortness of breath and states that she is 8 pounds over her dry weight.  BNP is 761.  Chest x-ray not very consistent with CHF exacerbation however patient does have crackles on inspiration.  Patient takes Bumex 2 mg twice daily and started patient on 100 mg of IV furosemide twice daily per CHF order set  Patient leaving against medical advice this am          * Chest pain  Assessment & Plan  Initial troponin 55 may be multifactorial  Patient leaving AMA this morning   Did not want to wait for formal assessment           Medical Problems       Resolved Problems  Date Reviewed: 4/1/2024            Resolved    Thrombocytopenia (HCC) 3/31/2024     Resolved by  Brooks Boykin MD        Discharging Physician / Practitioner: Dyana Morales MD  PCP: Rhina Pettit MD  Admission Date:   Admission Orders (From admission, onward)       Ordered         03/31/24 1400  INPATIENT ADMISSION  Once                          Discharge Date: 04/01/24    Consultations During Hospital Stay:  None .    Procedures Performed:   CXR - no acute cardiopulmonary disease.     Significant Findings / Test Results:   Elevated troponin    Incidental Findings:   None.    Left AMA.     Test Results Pending at Discharge (will require follow up):   None.      Outpatient Tests Requested:  NA patient left AMA.    Complications:  left AMA    Reason for Admission: chest pain and shortness of breath.     Hospital Course:   Lupe Menjivar is a 59 y.o. female patient who originally presented to the hospital on 3/31/2024 due to chest pain and shortness of breath.     She was given IV diuretics , however on the morning of April 1st she did not wish to stay in hospital any longer and asked to leave against medical advice.     She understood the risks associated including arrhythmia, worsening decompensated heart failure, cardiac arrest, MI and signed the A paperwork stating such.     Please see above list of diagnoses and related plan for additional information.     Patient was fully competent and able to make her own medical decisions at the time of discharge.     Condition at Discharge:  left AMA.     Discharge Day Visit / Exam:   Subjective:    Patient seen and examined   Feeling 'fine'  'I just want to leave now'  Vitals: Blood Pressure: 142/80 (04/01/24 0756)  Pulse: 61 (04/01/24 0756)  Temperature: 97.6 °F (36.4 °C) (04/01/24 0756)  Temp Source: Oral (04/01/24 0756)  Respirations: 18 (04/01/24 0756)  Weight - Scale: 99.6 kg (219 lb 9.6 oz) (04/01/24 0811)  SpO2: 96 % (04/01/24 0756)  Exam:   Physical Exam  Constitutional:       Appearance: She is not ill-appearing.   HENT:      Head: Normocephalic.   Cardiovascular:      Rate and Rhythm: Normal rate.   Pulmonary:      Effort: No respiratory distress.   Abdominal:      General: Abdomen is flat. There is no distension.   Musculoskeletal:       Right lower leg: No edema.      Left lower leg: No edema.   Neurological:      Mental Status: She is alert.   Psychiatric:      Comments: Not confused and able to make medical decisions.           Discussion with Family:  did not want me to call family. .     Discharge instructions/Information to patient and family:   See after visit summary for information provided to patient and family.      Provisions for Follow-Up Care:  See after visit summary for information related to follow-up care and any pertinent home health orders.      Mobility at time of Discharge:   [place] Achieved: 8: Walk 250 feet ot more  NA     Disposition:   Other: left AMA.     Planned Readmission: none, however I did ask her to return to ED if she changes her mind, or feels unwell.      Discharge Statement:  I spent 30 minutes discharging the patient. This time was spent on the day of discharge. I had direct contact with the patient on the day of discharge. Greater than 50% of the total time was spent examining patient, answering all patient questions, arranging and discussing plan of care with patient as well as directly providing post-discharge instructions.  Additional time then spent on discharge activities.    Discharge Medications:  See after visit summary for reconciled discharge medications provided to patient and/or family.      **Please Note: This note may have been constructed using a voice recognition system**

## 2024-04-01 NOTE — ASSESSMENT & PLAN NOTE
Initial troponin 55 may be multifactorial  Patient leaving AMA this morning   Did not want to wait for formal assessment

## 2024-04-01 NOTE — ASSESSMENT & PLAN NOTE
Blood pressure not at goal on arrival   Improved as of today   Patient wanted to leave against medical advice immediately after reaching the hospital floor from the ED- she signed the against medical advice paperwork and left hospital with no change to her medications, however she did say that she would follow up with her primary care doctor.

## 2024-04-01 NOTE — ASSESSMENT & PLAN NOTE
Wt Readings from Last 3 Encounters:   04/01/24 99.6 kg (219 lb 9.6 oz)   02/20/24 98.2 kg (216 lb 7.9 oz)   02/09/24 99.1 kg (218 lb 6.4 oz)     Patient presented with shortness of breath and states that she is 8 pounds over her dry weight.  BNP is 761.  Chest x-ray not very consistent with CHF exacerbation however patient does have crackles on inspiration.  Patient takes Bumex 2 mg twice daily and started patient on 100 mg of IV furosemide twice daily per CHF order set  Patient leaving against medical advice this am

## 2024-04-02 NOTE — UTILIZATION REVIEW
"Initial Clinical Review    Admission: Date/Time/Statement:   Admission Orders (From admission, onward)       Ordered        03/31/24 1400  INPATIENT ADMISSION  Once                          Orders Placed This Encounter   Procedures    INPATIENT ADMISSION     Standing Status:   Standing     Number of Occurrences:   1     Order Specific Question:   Level of Care     Answer:   Med Surg [16]     Order Specific Question:   Estimated length of stay     Answer:   More than 2 Midnights     Order Specific Question:   Certification     Answer:   I certify that inpatient services are medically necessary for this patient for a duration of greater than two midnights. See H&P and MD Progress Notes for additional information about the patient's course of treatment.     ED Arrival Information       Expected   -    Arrival   3/31/2024 11:35    Acuity   Emergent              Means of arrival   Walk-In    Escorted by   Family Member    Service   Hospitalist    Admission type   Emergency              Arrival complaint   chest pain             Chief Complaint   Patient presents with    Chest Pain     Left anterior non-radiating that started last night. Patient states \"it feels like somebody is sitting on my chest.\"       Initial Presentation: 59 y.o. female with PMHx: tobacco abuse, CKD stage IV, paroxysmal atrial fibrillation with pacemaker, morbid obesity, CHF, history of cocaine abuse ,hypertension  who presented to [facility] ED due to chest pain and shortness of breath.  On initial presentation patient's blood pressure was noted to be elevated to 221/142 patient states that she takes all of her medications daily including her metoprolol.  Patient also states that she is gained 8 pounds over her dry weight over the past week.  Patient denies using any illicit drugs.  Patient discloses that she smoked marijuana yesterday. Pt is homeless.  Labs revealed trop 55-->63, bnp 761, UDS positive for cocaine. Given IV Lasix x2, " norvasc and ultram in ED.  Plan:  Admit Inpatient status to med surg telemetry dt Chest pain, Heart Failure: trend trops, fu on procal, monitor BP and give antihypertensives, continue IV Lasix. Order Covid, flu and rsv. Continue Xarelto. Monitor labs including renal function. PT OT eval, SCD, cardiac diet with fluid restriction, monitor resp status, IO and daily wts.     Date:  4/1  Day 2: Crackles noted on exam. Pt was given IV diuretics , however on the morning of April 1st she did not wish to stay in hospital any longer and asked to leave against medical advice.      She understood the risks associated including arrhythmia, worsening decompensated heart failure, cardiac arrest, MI and signed the AMA paperwork stating such.     ED Triage Vitals [03/31/24 1139]   Temperature Pulse Respirations Blood Pressure SpO2   (!) 96.8 °F (36 °C) 75 18 (!) 222/142 97 %      Temp Source Heart Rate Source Patient Position - Orthostatic VS BP Location FiO2 (%)   Temporal Monitor Sitting Left arm --      Pain Score       8          Wt Readings from Last 1 Encounters:   04/01/24 99.6 kg (219 lb 9.6 oz)     Additional Vital Signs:   Date/Time Temp Pulse Resp BP MAP (mmHg) SpO2 O2 Device Patient Position - Orthostatic VS   04/01/24 07:56:47 97.6 °F (36.4 °C) 61 18 142/80 101 96 % None (Room air) Sitting   04/01/24 0530 -- 92 18 135/69 94 96 % None (Room air) Lying   03/31/24 2345 -- 62 18 117/67 86 98 % None (Room air) Lying   03/31/24 1721 -- 61 18 157/82 -- 97 % None (Room air) Sitting   03/31/24 1536 97.9 °F (36.6 °C) -- -- -- -- -- -- --   03/31/24 1415 -- 60 18 169/103 Abnormal  130 98 % None (Room air) Sitting   03/31/24 1300 -- 58 18 189/94 Abnormal  134 99 % None (Room air) Sitting   03/31/24 1230 -- 60 18 174/87 Abnormal  124 99 % None (Room air) Sitting   03/31/24 1205 -- -- -- -- -- -- None (Room air) --   03/31/24 1139 96.8 °F (36 °C) Abnormal  75 18 222/142 Abnormal  -- 97 % None (Room air) Sitting     Pertinent  Labs/Diagnostic Test Results:   3/31 EKG: AV dual-paced rhythm  Abnormal ECG    X-ray chest 2 views   ED Interpretation by Hermilo Nowak DO (03/31 1348)   No acute cardiopulmonary abnormality.      Final Result by Aziza Freeman MD (03/31 1910)      No acute cardiopulmonary disease.               Workstation performed: [id number]           Results from last 7 days   Lab Units 03/31/24  1535   SARS-COV-2  Negative     Results from last 7 days   Lab Units 04/01/24  0452 03/31/24  1209   WBC Thousand/uL 4.54 6.47   HEMOGLOBIN g/dL 11.9 13.4   HEMATOCRIT % 38.3 43.6   PLATELETS Thousands/uL 123* 181   NEUTROS ABS Thousands/µL 2.97 4.41         Results from last 7 days   Lab Units 04/01/24  0452 03/31/24  1209   SODIUM mmol/L 140 137   POTASSIUM mmol/L 3.6 3.8   CHLORIDE mmol/L 100 99   CO2 mmol/L 31 29   ANION GAP mmol/L 9 9   BUN mg/dL 21 17   CREATININE mg/dL 1.58* 1.54*   EGFR ml/min/1.73sq m 35 36   CALCIUM mg/dL 8.8 9.2     Results from last 7 days   Lab Units 04/01/24  0452   AST U/L 12*   ALT U/L 5*   ALK PHOS U/L 64   TOTAL PROTEIN g/dL 6.9   ALBUMIN g/dL 3.6   TOTAL BILIRUBIN mg/dL 0.51         Results from last 7 days   Lab Units 04/01/24  0452 03/31/24  1209   GLUCOSE RANDOM mg/dL 87 88     Results from last 7 days   Lab Units 03/31/24  1620 03/31/24  1445 03/31/24  1209   HS TNI 0HR ng/L  --   --  55*   HS TNI 2HR ng/L  --  63*  --    HSTNI D2 ng/L  --  8  --    HS TNI 4HR ng/L 54*  --   --    HSTNI D4 ng/L -1  --   --      Results from last 7 days   Lab Units 03/31/24  1209   PROCALCITONIN ng/ml <0.05     Results from last 7 days   Lab Units 03/31/24  1209   BNP pg/mL 761*     Results from last 7 days   Lab Units 03/31/24  1535   INFLUENZA A PCR  Negative   INFLUENZA B PCR  Negative   RSV PCR  Negative         Results from last 7 days   Lab Units 03/31/24  1445   AMPH/METH  Negative   BARBITURATE UR  Negative   BENZODIAZEPINE UR  Negative   COCAINE UR  Positive*   METHADONE URINE  Negative    OPIATE UR  Negative   PCP UR  Negative   THC UR  Negative     ED Treatment:   Medication Administration from 03/31/2024 1135 to 04/01/2024 0736         Date/Time Order Dose Route Action     03/31/2024 1251 EDT traMADol (ULTRAM) tablet 50 mg 50 mg Oral Given     03/31/2024 1353 EDT furosemide (LASIX) injection 60 mg 60 mg Intravenous Given     03/31/2024 1446 EDT amLODIPine (NORVASC) tablet 5 mg 5 mg Oral Given     03/31/2024 1811 EDT furosemide (LASIX) injection 100 mg 100 mg Intravenous Given          Past Medical History:   Diagnosis Date    ACS (acute coronary syndrome) ([facility]) 02/07/2021    Acute on chronic diastolic CHF 01/23/2019    Anemia 01/14/2023    Arthritis     Atrial fibrillation with rapid ventricular response ([facility]) 03/20/2016    Breast lump     CKD (chronic kidney disease) stage 3, GFR 30-59 ml/min ([facility])     Disease of thyroid gland     Elevated troponin 09/09/2019    Epigastric abdominal tenderness 08/15/2021    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 ([facility]) 05/25/2020    GERD (gastroesophageal reflux disease)     H/O transfusion 1987    Hepatitis C     resolved    History of tachycardia induced cardiomyopathy 05/2021    in setting of rapid atrial flutter in 05/2021 and again 06/2022    History of ventricular tachycardia 07/2016    s/p ICD    Hyperlipidemia     Hypertension     Hypokalemia 07/23/2023    Inappropriate ICD discharge for atrial flutter 04/2023    NSTEMI (non-ST elevated myocardial infarction) ([facility]) 12/26/2018    Sleep apnea     no cpap     Present on Admission:   Tobacco abuse   (Resolved) Thrombocytopenia ([facility])   Stage 4 chronic kidney disease ([facility])   Paroxysmal A-fib ([facility])   Morbid obesity   Mild acute on chronic diastolic congestive heart failure ([facility])   HTN (hypertension)   Cocaine abuse ([facility])   Chest pain   SOB (shortness of breath)      Admitting Diagnosis: Chest pain [R07.9]  Elevated troponin [R79.89]  Homeless [Z59.00]  CHF exacerbation ([facility])  [I50.9]  Age/Sex: 59 y.o. female  Admission Orders:  Medications 03/31 04/01   amLODIPine (NORVASC) tablet 5 mg  Dose: 5 mg  Freq: Daily Route: PO  Start: 03/31/24 1445 End: 04/01/24 1229   Admin Instructions:      Order specific questions:       1446 0927     1229-D/C'd      furosemide (LASIX) injection 100 mg  Dose: 100 mg  Freq: 2 times daily Route: IV  Start: 03/31/24 1800 End: 04/01/24 1229   Admin Instructions:      Order specific questions:       1811 0927     1229-D/C'd      furosemide (LASIX) injection 60 mg  Dose: 60 mg  Freq: Once Route: IV  Start: 03/31/24 1330 End: 03/31/24 1353   Admin Instructions:      Order specific questions:       1353         pantoprazole (PROTONIX) EC tablet 40 mg  Dose: 40 mg  Freq: Daily Route: PO  Start: 04/01/24 0900 End: 04/01/24 1229   Admin Instructions:        0927     1229-D/C'd      potassium chloride (Klor-Con M20) CR tablet 20 mEq  Dose: 20 mEq  Freq: Daily Route: PO  Start: 04/01/24 0900 End: 04/01/24 1229   Admin Instructions:        0927     1229-D/C'd      rivaroxaban (XARELTO) tablet 15 mg  Dose: 15 mg  Freq: Every evening Route: PO  Start: 03/31/24 1830 End: 04/01/24 1229   Admin Instructions:      Order specific questions:       (1823) [C]      0900     1229-D/C'd      traMADol (ULTRAM) tablet 50 mg  Dose: 50 mg  Freq: Once Route: PO  Start: 03/31/24 1245 End: 03/31/24 1251   Admin Instructions:       1251         Legend:       [id number]/2303/2403/2503/2603/2703/2803/2903/3003/3104/01        Continuous Meds none    PRN Meds Sorted by Name  for Lupe Menjivar as of 03/23/24 through 4/1/24  Legend:       Medications 03/31 04/01    sodium chloride (PF) 0.9 % injection 3 mL  Dose: 3 mL  Freq: Every 1 hour PRN Route: IV  PRN Reason: line care  Start: 03/31/24 1202 End: 04/01/24 1229     1229-D/C'd                  Network Utilization Review Department  ATTENTION: Please call with any questions or concerns to [phone] and carefully listen to the  prompts so that you are directed to the right person. All voicemails are confidential.   For Discharge needs, contact Care Management DC Support Team at [phone] opt. 2  Send all requests for admission clinical reviews, approved or denied determinations and any other requests to dedicated fax number below belonging to the campus where the patient is receiving treatment. List of dedicated fax numbers for the Facilities:  FACILITY NAME UR FAX NUMBER   ADMISSION DENIALS (Administrative/Medical Necessity) [phone]   DISCHARGE SUPPORT TEAM (NETWORK) [phone]   PARENT CHILD HEALTH (Maternity/NICU/Pediatrics) [phone]   [facility] [phone]   [facility] [phone]   [facility] [phone]   [facility] [phone]   [facility] [phone]   [facility] [phone]   [facility] [phone]   [facility] [phone]   [facility] [phone]   [facility] [phone]   [facility] [phone]   [facility] [phone]

## 2024-04-28 ENCOUNTER — HOSPITAL ENCOUNTER (EMERGENCY)
Facility: HOSPITAL | Age: 59
Discharge: HOME | End: 2024-04-28
Attending: EMERGENCY MEDICINE | Admitting: EMERGENCY MEDICINE
Payer: COMMERCIAL

## 2024-04-28 ENCOUNTER — APPOINTMENT (EMERGENCY)
Dept: RADIOLOGY | Facility: HOSPITAL | Age: 59
End: 2024-04-28
Payer: COMMERCIAL

## 2024-04-28 VITALS
HEART RATE: 79 BPM | HEIGHT: 67 IN | BODY MASS INDEX: 34.36 KG/M2 | SYSTOLIC BLOOD PRESSURE: 190 MMHG | OXYGEN SATURATION: 95 % | RESPIRATION RATE: 18 BRPM | DIASTOLIC BLOOD PRESSURE: 91 MMHG | TEMPERATURE: 97.8 F | WEIGHT: 218.92 LBS

## 2024-04-28 DIAGNOSIS — S93.402A LEFT ANKLE SPRAIN: Primary | ICD-10-CM

## 2024-04-28 DIAGNOSIS — T14.8XXA ABRASION: ICD-10-CM

## 2024-04-28 PROCEDURE — 99283 EMERGENCY DEPT VISIT LOW MDM: CPT

## 2024-04-28 PROCEDURE — 73610 X-RAY EXAM OF ANKLE: CPT

## 2024-04-28 PROCEDURE — 90471 IMMUNIZATION ADMIN: CPT

## 2024-04-28 PROCEDURE — 99284 EMERGENCY DEPT VISIT MOD MDM: CPT | Performed by: EMERGENCY MEDICINE

## 2024-04-28 PROCEDURE — 90715 TDAP VACCINE 7 YRS/> IM: CPT | Performed by: EMERGENCY MEDICINE

## 2024-04-28 RX ADMIN — TETANUS TOXOID, REDUCED DIPHTHERIA TOXOID AND ACELLULAR PERTUSSIS VACCINE, ADSORBED 0.5 ML: 5; 2.5; 8; 8; 2.5 SUSPENSION INTRAMUSCULAR at 11:29
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
## 2024-05-23 ENCOUNTER — HOSPITAL ENCOUNTER (EMERGENCY)
Facility: HOSPITAL | Age: 59
Discharge: HOME | End: 2024-05-23
Attending: INTERNAL MEDICINE
Payer: COMMERCIAL

## 2024-05-23 VITALS
HEART RATE: 92 BPM | DIASTOLIC BLOOD PRESSURE: 136 MMHG | OXYGEN SATURATION: 98 % | SYSTOLIC BLOOD PRESSURE: 239 MMHG | TEMPERATURE: 97.6 F | RESPIRATION RATE: 18 BRPM

## 2024-05-23 DIAGNOSIS — H10.9 CONJUNCTIVITIS: Primary | ICD-10-CM

## 2024-05-23 PROCEDURE — 99282 EMERGENCY DEPT VISIT SF MDM: CPT

## 2024-05-23 PROCEDURE — 99284 EMERGENCY DEPT VISIT MOD MDM: CPT | Performed by: INTERNAL MEDICINE

## 2024-05-23 RX ORDER — TETRACAINE HYDROCHLORIDE 5 MG/ML
1 SOLUTION OPHTHALMIC ONCE
Status: COMPLETED | OUTPATIENT
Start: 2024-05-23 | End: 2024-05-23

## 2024-05-23 RX ORDER — POLYMYXIN B SULFATE AND TRIMETHOPRIM 1; 10000 MG/ML; [USP'U]/ML
1 SOLUTION OPHTHALMIC EVERY 4 HOURS
Qty: 10 ML | Refills: 0 | Status: SHIPPED | OUTPATIENT
Start: 2024-05-23

## 2024-05-23 RX ADMIN — TETRACAINE HYDROCHLORIDE 1 DROP: 5 SOLUTION OPHTHALMIC at 14:30

## 2024-05-23 RX ADMIN — FLUORESCEIN SODIUM 1 STRIP: 1 STRIP OPHTHALMIC at 14:30
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
## 2024-05-23 NOTE — DISCHARGE INSTRUCTIONS
Follow-up with your ophthalmologist.  Results of the eyedrops every 4 hours as instructed.  Avoid sharing the towels with other family members.

## 2024-06-13 ENCOUNTER — APPOINTMENT (EMERGENCY)
Dept: RADIOLOGY | Facility: HOSPITAL | Age: 59
DRG: 194 | End: 2024-06-13
Payer: COMMERCIAL

## 2024-06-13 ENCOUNTER — HOSPITAL ENCOUNTER (INPATIENT)
Facility: HOSPITAL | Age: 59
LOS: 3 days | Discharge: HOME | DRG: 194 | End: 2024-06-17
Attending: EMERGENCY MEDICINE | Admitting: INTERNAL MEDICINE
Payer: COMMERCIAL

## 2024-06-13 DIAGNOSIS — J81.0 FLASH PULMONARY EDEMA (HCC): ICD-10-CM

## 2024-06-13 DIAGNOSIS — I50.9 CHF (CONGESTIVE HEART FAILURE) (HCC): ICD-10-CM

## 2024-06-13 DIAGNOSIS — R06.00 DYSPNEA: Primary | ICD-10-CM

## 2024-06-13 LAB
ALBUMIN SERPL BCP-MCNC: 4 G/DL (ref 3.5–5)
ALP SERPL-CCNC: 75 U/L (ref 34–104)
ALT SERPL W P-5'-P-CCNC: 7 U/L (ref 7–52)
ANION GAP SERPL CALCULATED.3IONS-SCNC: 10 MMOL/L (ref 4–13)
AST SERPL W P-5'-P-CCNC: 15 U/L (ref 13–39)
BASE EXCESS BLDA CALC-SCNC: -6 MMOL/L (ref -2–3)
BASOPHILS # BLD AUTO: 0.03 THOUSANDS/ÂΜL (ref 0–0.1)
BASOPHILS NFR BLD AUTO: 0 % (ref 0–1)
BILIRUB SERPL-MCNC: 0.49 MG/DL (ref 0.2–1)
BUN SERPL-MCNC: 20 MG/DL (ref 5–25)
CA-I BLD-SCNC: 1.15 MMOL/L (ref 1.12–1.32)
CALCIUM SERPL-MCNC: 8.5 MG/DL (ref 8.4–10.2)
CHLORIDE SERPL-SCNC: 108 MMOL/L (ref 96–108)
CO2 SERPL-SCNC: 21 MMOL/L (ref 21–32)
CREAT SERPL-MCNC: 1.62 MG/DL (ref 0.6–1.3)
EOSINOPHIL # BLD AUTO: 0.09 THOUSAND/ÂΜL (ref 0–0.61)
EOSINOPHIL NFR BLD AUTO: 1 % (ref 0–6)
ERYTHROCYTE [DISTWIDTH] IN BLOOD BY AUTOMATED COUNT: 15.9 % (ref 11.6–15.1)
GFR SERPL CREATININE-BSD FRML MDRD: 34 ML/MIN/1.73SQ M
GLUCOSE SERPL-MCNC: 171 MG/DL (ref 65–140)
GLUCOSE SERPL-MCNC: 196 MG/DL (ref 65–140)
HCO3 BLDA-SCNC: 21.9 MMOL/L (ref 24–30)
HCT VFR BLD AUTO: 36 % (ref 34.8–46.1)
HCT VFR BLD CALC: 34 % (ref 34.8–46.1)
HGB BLD-MCNC: 10.8 G/DL (ref 11.5–15.4)
HGB BLDA-MCNC: 11.6 G/DL (ref 11.5–15.4)
IMM GRANULOCYTES # BLD AUTO: 0.07 THOUSAND/UL (ref 0–0.2)
IMM GRANULOCYTES NFR BLD AUTO: 1 % (ref 0–2)
LYMPHOCYTES # BLD AUTO: 1.7 THOUSANDS/ÂΜL (ref 0.6–4.47)
LYMPHOCYTES NFR BLD AUTO: 19 % (ref 14–44)
MCH RBC QN AUTO: 26.3 PG (ref 26.8–34.3)
MCHC RBC AUTO-ENTMCNC: 30 G/DL (ref 31.4–37.4)
MCV RBC AUTO: 88 FL (ref 82–98)
MONOCYTES # BLD AUTO: 0.39 THOUSAND/ÂΜL (ref 0.17–1.22)
MONOCYTES NFR BLD AUTO: 4 % (ref 4–12)
NEUTROPHILS # BLD AUTO: 6.84 THOUSANDS/ÂΜL (ref 1.85–7.62)
NEUTS SEG NFR BLD AUTO: 75 % (ref 43–75)
NRBC BLD AUTO-RTO: 0 /100 WBCS
PCO2 BLD: 23 MMOL/L (ref 21–32)
PCO2 BLD: 50.5 MM HG (ref 42–50)
PH BLD: 7.25 [PH] (ref 7.3–7.4)
PLATELET # BLD AUTO: 168 THOUSANDS/UL (ref 149–390)
PMV BLD AUTO: 11.5 FL (ref 8.9–12.7)
PO2 BLD: 80 MM HG (ref 35–45)
POTASSIUM BLD-SCNC: 4.3 MMOL/L (ref 3.5–5.3)
POTASSIUM SERPL-SCNC: 4.4 MMOL/L (ref 3.5–5.3)
PROT SERPL-MCNC: 7.2 G/DL (ref 6.4–8.4)
RBC # BLD AUTO: 4.11 MILLION/UL (ref 3.81–5.12)
SAO2 % BLD FROM PO2: 93 % (ref 60–85)
SODIUM BLD-SCNC: 141 MMOL/L (ref 136–145)
SODIUM SERPL-SCNC: 139 MMOL/L (ref 135–147)
SPECIMEN SOURCE: ABNORMAL
WBC # BLD AUTO: 9.12 THOUSAND/UL (ref 4.31–10.16)

## 2024-06-13 PROCEDURE — 82330 ASSAY OF CALCIUM: CPT

## 2024-06-13 PROCEDURE — 85025 COMPLETE CBC W/AUTO DIFF WBC: CPT

## 2024-06-13 PROCEDURE — 99285 EMERGENCY DEPT VISIT HI MDM: CPT

## 2024-06-13 PROCEDURE — 93005 ELECTROCARDIOGRAM TRACING: CPT

## 2024-06-13 PROCEDURE — 85014 HEMATOCRIT: CPT

## 2024-06-13 PROCEDURE — 84132 ASSAY OF SERUM POTASSIUM: CPT

## 2024-06-13 PROCEDURE — 83880 ASSAY OF NATRIURETIC PEPTIDE: CPT

## 2024-06-13 PROCEDURE — 96365 THER/PROPH/DIAG IV INF INIT: CPT

## 2024-06-13 PROCEDURE — 80053 COMPREHEN METABOLIC PANEL: CPT

## 2024-06-13 PROCEDURE — 82947 ASSAY GLUCOSE BLOOD QUANT: CPT

## 2024-06-13 PROCEDURE — 84145 PROCALCITONIN (PCT): CPT

## 2024-06-13 PROCEDURE — 36415 COLL VENOUS BLD VENIPUNCTURE: CPT

## 2024-06-13 PROCEDURE — 94002 VENT MGMT INPAT INIT DAY: CPT

## 2024-06-13 PROCEDURE — 94760 N-INVAS EAR/PLS OXIMETRY 1: CPT

## 2024-06-13 PROCEDURE — 84295 ASSAY OF SERUM SODIUM: CPT

## 2024-06-13 PROCEDURE — 82803 BLOOD GASES ANY COMBINATION: CPT

## 2024-06-13 PROCEDURE — 84484 ASSAY OF TROPONIN QUANT: CPT

## 2024-06-13 PROCEDURE — 99291 CRITICAL CARE FIRST HOUR: CPT | Performed by: EMERGENCY MEDICINE

## 2024-06-13 PROCEDURE — 71045 X-RAY EXAM CHEST 1 VIEW: CPT

## 2024-06-13 RX ORDER — NITROGLYCERIN 20 MG/100ML
50 INJECTION INTRAVENOUS
Status: DISCONTINUED | OUTPATIENT
Start: 2024-06-13 | End: 2024-06-17 | Stop reason: HOSPADM

## 2024-06-13 RX ORDER — ONDANSETRON 2 MG/ML
1 INJECTION INTRAMUSCULAR; INTRAVENOUS ONCE
Status: COMPLETED | OUTPATIENT
Start: 2024-06-13 | End: 2024-06-13

## 2024-06-13 RX ADMIN — NITROGLYCERIN 50 MCG/MIN: 20 INJECTION INTRAVENOUS at 23:37

## 2024-06-14 ENCOUNTER — APPOINTMENT (INPATIENT)
Dept: NON INVASIVE DIAGNOSTICS | Facility: HOSPITAL | Age: 59
DRG: 194 | End: 2024-06-14
Payer: COMMERCIAL

## 2024-06-14 PROBLEM — I50.33 ACUTE ON CHRONIC DIASTOLIC HEART FAILURE (HCC): Status: ACTIVE | Noted: 2024-06-14

## 2024-06-14 PROBLEM — J81.0 FLASH PULMONARY EDEMA (HCC): Status: ACTIVE | Noted: 2024-06-14

## 2024-06-14 LAB
2HR DELTA HS TROPONIN: 76 NG/L
4HR DELTA HS TROPONIN: 124 NG/L
AMPHETAMINES SERPL QL SCN: NEGATIVE
ANION GAP SERPL CALCULATED.3IONS-SCNC: 6 MMOL/L (ref 4–13)
AORTIC ROOT: 3.4 CM
APICAL FOUR CHAMBER EJECTION FRACTION: 46 %
ASCENDING AORTA: 3.6 CM
ATRIAL RATE: 68 BPM
BARBITURATES UR QL: NEGATIVE
BENZODIAZ UR QL: NEGATIVE
BNP SERPL-MCNC: 926 PG/ML (ref 0–100)
BSA FOR ECHO PROCEDURE: 2.15 M2
BUN SERPL-MCNC: 21 MG/DL (ref 5–25)
CALCIUM SERPL-MCNC: 8.2 MG/DL (ref 8.4–10.2)
CARDIAC TROPONIN I PNL SERPL HS: 128 NG/L
CARDIAC TROPONIN I PNL SERPL HS: 176 NG/L
CARDIAC TROPONIN I PNL SERPL HS: 52 NG/L
CHLORIDE SERPL-SCNC: 106 MMOL/L (ref 96–108)
CO2 SERPL-SCNC: 27 MMOL/L (ref 21–32)
COCAINE UR QL: NEGATIVE
CREAT SERPL-MCNC: 1.6 MG/DL (ref 0.6–1.3)
E WAVE DECELERATION TIME: 276 MS
E/A RATIO: 3.29
FENTANYL UR QL SCN: NEGATIVE
FRACTIONAL SHORTENING: 35 (ref 28–44)
GFR SERPL CREATININE-BSD FRML MDRD: 35 ML/MIN/1.73SQ M
GLUCOSE SERPL-MCNC: 113 MG/DL (ref 65–140)
GLUCOSE SERPL-MCNC: 117 MG/DL (ref 65–140)
GLUCOSE SERPL-MCNC: 137 MG/DL (ref 65–140)
GLUCOSE SERPL-MCNC: 78 MG/DL (ref 65–140)
GLUCOSE SERPL-MCNC: 99 MG/DL (ref 65–140)
HYDROCODONE UR QL SCN: NEGATIVE
INTERVENTRICULAR SEPTUM IN DIASTOLE (PARASTERNAL SHORT AXIS VIEW): 2 CM
INTERVENTRICULAR SEPTUM: 2 CM (ref 0.6–1.1)
LAAS-AP2: 27.9 CM2
LAAS-AP4: 23.4 CM2
LEFT ATRIUM SIZE: 4.8 CM
LEFT ATRIUM VOLUME (MOD BIPLANE): 87 ML
LEFT ATRIUM VOLUME INDEX (MOD BIPLANE): 40.5 ML/M2
LEFT INTERNAL DIMENSION IN SYSTOLE: 2.8 CM (ref 2.1–4)
LEFT VENTRICULAR INTERNAL DIMENSION IN DIASTOLE: 4.3 CM (ref 3.5–6)
LEFT VENTRICULAR POSTERIOR WALL IN END DIASTOLE: 2 CM
LEFT VENTRICULAR STROKE VOLUME: 54 ML
LVSV (TEICH): 54 ML
MAGNESIUM SERPL-MCNC: 2.1 MG/DL (ref 1.9–2.7)
METHADONE UR QL: NEGATIVE
MV E'TISSUE VEL-LAT: 7 CM/S
MV E'TISSUE VEL-SEP: 3 CM/S
MV PEAK A VEL: 0.28 M/S
MV PEAK E VEL: 92 CM/S
MV STENOSIS PRESSURE HALF TIME: 80 MS
MV VALVE AREA P 1/2 METHOD: 2.75
OPIATES UR QL SCN: NEGATIVE
OXYCODONE+OXYMORPHONE UR QL SCN: POSITIVE
P AXIS: 72 DEGREES
PCP UR QL: NEGATIVE
POTASSIUM SERPL-SCNC: 4.9 MMOL/L (ref 3.5–5.3)
PR INTERVAL: 192 MS
PROCALCITONIN SERPL-MCNC: <0.05 NG/ML
QRS AXIS: -72 DEGREES
QRSD INTERVAL: 180 MS
QT INTERVAL: 464 MS
QTC INTERVAL: 493 MS
RA PRESSURE ESTIMATED: 8 MMHG
RIGHT ATRIUM AREA SYSTOLE A4C: 14.4 CM2
RIGHT VENTRICLE ID DIMENSION: 4.5 CM
RV PSP: 53 MMHG
SL CV LEFT ATRIUM LENGTH A2C: 6.5 CM
SL CV LV EF: 60
SL CV PED ECHO LEFT VENTRICLE DIASTOLIC VOLUME (MOD BIPLANE) 2D: 85 ML
SL CV PED ECHO LEFT VENTRICLE SYSTOLIC VOLUME (MOD BIPLANE) 2D: 31 ML
SODIUM SERPL-SCNC: 139 MMOL/L (ref 135–147)
T WAVE AXIS: 100 DEGREES
THC UR QL: NEGATIVE
TR MAX PG: 45 MMHG
TR PEAK VELOCITY: 3.4 M/S
TRICUSPID ANNULAR PLANE SYSTOLIC EXCURSION: 2.3 CM
TRICUSPID VALVE PEAK REGURGITATION VELOCITY: 3.37 M/S
VENTRICULAR RATE: 68 BPM

## 2024-06-14 PROCEDURE — 93010 ELECTROCARDIOGRAM REPORT: CPT | Performed by: INTERNAL MEDICINE

## 2024-06-14 PROCEDURE — 36415 COLL VENOUS BLD VENIPUNCTURE: CPT

## 2024-06-14 PROCEDURE — 99223 1ST HOSP IP/OBS HIGH 75: CPT | Performed by: INTERNAL MEDICINE

## 2024-06-14 PROCEDURE — 83735 ASSAY OF MAGNESIUM: CPT | Performed by: INTERNAL MEDICINE

## 2024-06-14 PROCEDURE — 80307 DRUG TEST PRSMV CHEM ANLYZR: CPT | Performed by: INTERNAL MEDICINE

## 2024-06-14 PROCEDURE — 80048 BASIC METABOLIC PNL TOTAL CA: CPT | Performed by: INTERNAL MEDICINE

## 2024-06-14 PROCEDURE — 93306 TTE W/DOPPLER COMPLETE: CPT | Performed by: INTERNAL MEDICINE

## 2024-06-14 PROCEDURE — 96368 THER/DIAG CONCURRENT INF: CPT

## 2024-06-14 PROCEDURE — 82948 REAGENT STRIP/BLOOD GLUCOSE: CPT

## 2024-06-14 PROCEDURE — 5A09457 ASSISTANCE WITH RESPIRATORY VENTILATION, 24-96 CONSECUTIVE HOURS, CONTINUOUS POSITIVE AIRWAY PRESSURE: ICD-10-PCS | Performed by: PEDIATRICS

## 2024-06-14 PROCEDURE — 96366 THER/PROPH/DIAG IV INF ADDON: CPT

## 2024-06-14 PROCEDURE — 84484 ASSAY OF TROPONIN QUANT: CPT

## 2024-06-14 PROCEDURE — 93306 TTE W/DOPPLER COMPLETE: CPT

## 2024-06-14 RX ORDER — BUTALBITAL, ACETAMINOPHEN AND CAFFEINE 50; 325; 40 MG/1; MG/1; MG/1
1 TABLET ORAL 2 TIMES DAILY PRN
Status: DISCONTINUED | OUTPATIENT
Start: 2024-06-14 | End: 2024-06-17 | Stop reason: HOSPADM

## 2024-06-14 RX ORDER — NICOTINE 21 MG/24HR
1 PATCH, TRANSDERMAL 24 HOURS TRANSDERMAL DAILY
Status: DISCONTINUED | OUTPATIENT
Start: 2024-06-14 | End: 2024-06-17 | Stop reason: HOSPADM

## 2024-06-14 RX ORDER — FUROSEMIDE 10 MG/ML
80 INJECTION INTRAMUSCULAR; INTRAVENOUS
Status: DISCONTINUED | OUTPATIENT
Start: 2024-06-14 | End: 2024-06-14

## 2024-06-14 RX ORDER — OXYCODONE HYDROCHLORIDE 5 MG/1
5 TABLET ORAL EVERY 4 HOURS PRN
Status: DISCONTINUED | OUTPATIENT
Start: 2024-06-14 | End: 2024-06-17 | Stop reason: HOSPADM

## 2024-06-14 RX ORDER — HEPARIN SODIUM 5000 [USP'U]/ML
5000 INJECTION, SOLUTION INTRAVENOUS; SUBCUTANEOUS EVERY 8 HOURS SCHEDULED
Status: DISCONTINUED | OUTPATIENT
Start: 2024-06-14 | End: 2024-06-14

## 2024-06-14 RX ORDER — AMLODIPINE BESYLATE 5 MG/1
5 TABLET ORAL DAILY
Status: DISCONTINUED | OUTPATIENT
Start: 2024-06-14 | End: 2024-06-15

## 2024-06-14 RX ORDER — FERROUS GLUCONATE 324(38)MG
162 TABLET ORAL 3 TIMES WEEKLY
Status: DISCONTINUED | OUTPATIENT
Start: 2024-06-14 | End: 2024-06-17 | Stop reason: HOSPADM

## 2024-06-14 RX ORDER — METOPROLOL SUCCINATE 50 MG/1
50 TABLET, EXTENDED RELEASE ORAL DAILY
Status: DISCONTINUED | OUTPATIENT
Start: 2024-06-14 | End: 2024-06-17 | Stop reason: HOSPADM

## 2024-06-14 RX ORDER — ACETAMINOPHEN 325 MG/1
975 TABLET ORAL ONCE
Status: COMPLETED | OUTPATIENT
Start: 2024-06-14 | End: 2024-06-14

## 2024-06-14 RX ORDER — BUMETANIDE 0.25 MG/ML
3 INJECTION INTRAMUSCULAR; INTRAVENOUS
Status: DISCONTINUED | OUTPATIENT
Start: 2024-06-14 | End: 2024-06-16

## 2024-06-14 RX ORDER — ACETAMINOPHEN 325 MG/1
650 TABLET ORAL EVERY 6 HOURS PRN
Status: DISCONTINUED | OUTPATIENT
Start: 2024-06-14 | End: 2024-06-17 | Stop reason: HOSPADM

## 2024-06-14 RX ORDER — PANTOPRAZOLE SODIUM 40 MG/1
40 TABLET, DELAYED RELEASE ORAL
Status: DISCONTINUED | OUTPATIENT
Start: 2024-06-14 | End: 2024-06-17 | Stop reason: HOSPADM

## 2024-06-14 RX ADMIN — AMLODIPINE BESYLATE 5 MG: 5 TABLET ORAL at 13:15

## 2024-06-14 RX ADMIN — FUROSEMIDE 120 MG: 10 INJECTION, SOLUTION INTRAMUSCULAR; INTRAVENOUS at 00:36

## 2024-06-14 RX ADMIN — OXYCODONE HYDROCHLORIDE 5 MG: 5 TABLET ORAL at 06:11

## 2024-06-14 RX ADMIN — METOPROLOL SUCCINATE 50 MG: 50 TABLET, EXTENDED RELEASE ORAL at 09:07

## 2024-06-14 RX ADMIN — BUMETANIDE 3 MG: 0.25 INJECTION INTRAMUSCULAR; INTRAVENOUS at 17:03

## 2024-06-14 RX ADMIN — ACETAMINOPHEN 975 MG: 325 TABLET, FILM COATED ORAL at 01:27

## 2024-06-14 RX ADMIN — RIVAROXABAN 15 MG: 15 TABLET, FILM COATED ORAL at 17:04

## 2024-06-14 RX ADMIN — FUROSEMIDE 80 MG: 10 INJECTION, SOLUTION INTRAMUSCULAR; INTRAVENOUS at 13:16

## 2024-06-14 RX ADMIN — BUTALBITAL, ACETAMINOPHEN AND CAFFEINE 1 TABLET: 50; 325; 40 TABLET ORAL at 17:12

## 2024-06-14 RX ADMIN — FERROUS GLUCONATE 162 MG: 324 TABLET ORAL at 13:17

## 2024-06-14 RX ADMIN — NICOTINE 1 PATCH: 21 PATCH, EXTENDED RELEASE TRANSDERMAL at 09:07

## 2024-06-14 RX ADMIN — ACETAMINOPHEN 650 MG: 325 TABLET, FILM COATED ORAL at 11:07

## 2024-06-14 RX ADMIN — OXYCODONE HYDROCHLORIDE 5 MG: 5 TABLET ORAL at 13:25

## 2024-06-14 RX ADMIN — OXYCODONE HYDROCHLORIDE 5 MG: 5 TABLET ORAL at 19:00

## 2024-06-14 RX ADMIN — PANTOPRAZOLE SODIUM 40 MG: 40 TABLET, DELAYED RELEASE ORAL at 06:11

## 2024-06-14 RX ADMIN — BUTALBITAL, ACETAMINOPHEN AND CAFFEINE 1 TABLET: 50; 325; 40 TABLET ORAL at 09:16

## 2024-06-14 RX ADMIN — ACETAMINOPHEN 650 MG: 325 TABLET, FILM COATED ORAL at 21:57
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
## 2024-06-14 NOTE — ASSESSMENT & PLAN NOTE
Lab Results   Component Value Date    EGFR 34 06/13/2024    EGFR 35 04/01/2024    EGFR 36 03/31/2024    CREATININE 1.62 (H) 06/13/2024    CREATININE 1.58 (H) 04/01/2024    CREATININE 1.54 (H) 03/31/2024     Creatinine at baseline  Daily metabolic panel

## 2024-06-14 NOTE — RESPIRATORY THERAPY NOTE
RT Protocol Note  Lupe Menjivar 59 y.o. female MRN: [id number]  Unit/Bed#: ED 19 Encounter: [id number]    Assessment    Principal Problem:    Flash pulmonary edema (HCC)  Active Problems:    History of monomorphic ventricular tachycardia, s/p ICD in 2016    Cocaine abuse (HCC)    Atrial flutter ([facility])    Benign hypertension with CKD (chronic kidney disease) stage IV ([facility])    Acute on chronic diastolic heart failure ([facility])      Home Pulmonary Medications:  None       Past Medical History:   Diagnosis Date    ACS (acute coronary syndrome) ([facility]) 02/07/2021    Acute on chronic diastolic CHF 01/23/2019    Anemia 01/14/2023    Arthritis     Atrial fibrillation with rapid ventricular response ([facility]) 03/20/2016    Breast lump     CKD (chronic kidney disease) stage 3, GFR 30-59 ml/min ([facility])     Disease of thyroid gland     Elevated troponin 09/09/2019    Epigastric abdominal tenderness 08/15/2021    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 ([facility]) 05/25/2020    GERD (gastroesophageal reflux disease)     H/O transfusion 1987    Hepatitis C     resolved    History of tachycardia induced cardiomyopathy 05/2021    in setting of rapid atrial flutter in 05/2021 and again 06/2022    History of ventricular tachycardia 07/2016    s/p ICD    Hyperlipidemia     Hypertension     Hypokalemia 07/23/2023    Inappropriate ICD discharge for atrial flutter 04/2023    NSTEMI (non-ST elevated myocardial infarction) ([facility]) 12/26/2018    Sleep apnea     no cpap     Social History     Socioeconomic History    Marital status: /Civil Union     Spouse name: None    Number of children: None    Years of education: 12    Highest education level: None   Occupational History    None   Tobacco Use    Smoking status: Every Day     Current packs/day: 0.50     Average packs/day: 0.5 packs/day for 35.0 years (17.5 ttl pk-yrs)     Types: Cigarettes    Smokeless tobacco: Never   Vaping Use    Vaping status: Never Used   Substance and  Sexual Activity    Alcohol use: Not Currently    Drug use: Yes     Types: Marijuana    Sexual activity: Yes     Partners: Male     Birth control/protection: None   Other Topics Concern    None   Social History Narrative    Disabled        · Do you currently or have you served in the  Armed Forces:   No      · Were you activated, into active duty, as a member of the [employer] or as a Reservist:   No      · Diet:   Regular      · General stress level:   High      · Has smoked since age:   16      · Caffeine intake:   Moderate      · Guns present in home:   No      · Seat belts used routinely:   Yes      · Sunscreen used routinely:   No      · Advance directive:   No      · Live alone or with others:   with others      · International travel:   no      · Pets:   No      · Blind or serious difficulty seeing:   Yes     · Difficulty concentrating, remembering or making decisions:   No      · Difficulty walking or climbing stairs:   Yes      · Difficulty dressing or bathing:   No      · Difficulty doing errands alone:   No      · What is the highest grade or level of school you have completed or the highest degree you have received:   12th grade, no diploma      · How many days of moderate to strenuous exercise, like a brisk walk, did you do in the last 7 days:   0      · How hard is it for you to pay for the very basics like food, housing, medical care, and heating:   Somewhat hard      · Do you feel stress - tense, restless, nervous, or anxious, or unable to sleep at night because your mind is troubled all the time - these days:   Only a little          Social Determinants of Health     Financial Resource Strain: Low Risk  (1/5/2024)    [facility]    Overall Financial Resource Strain (CARDIA)     Difficulty of Paying Living Expenses: Not hard at all   Food Insecurity: No Food Insecurity (1/5/2024)    Received from [facility]    Hunger Vital Sign     Worried About Running Out of Food in the Last Year: Never true     Ran Out of Food in the Last Year: Never true   Transportation Needs: No Transportation Needs (1/5/2024)    [facility]    PRAPARE - Transportation     Lack of Transportation (Medical): No     Lack of Transportation (Non-Medical): No   Physical Activity: Not on file   Stress: Not on file   Social Connections: Not on file   Intimate Partner Violence: Not At Risk (1/5/2024)    [facility]    Humiliation, Afraid, Rape, and Kick questionnaire     Fear of Current or Ex-Partner: No     Emotionally Abused: No     Physically Abused: No     Sexually Abused: No   Housing Stability: Low Risk  (1/5/2024)    [facility]    Housing Stability Vital Sign     Unable to Pay for Housing in the Last Year: No     Number of Places Lived in the Last Year: 1     Unstable Housing in the Last Year: No       Subjective         Objective    Physical Exam:   Assessment Type: Assess only  General Appearance: Awake, Alert  Respiratory Pattern: Labored  Chest Assessment: Chest expansion symmetrical  Bilateral Breath Sounds: (P) Crackles    Vitals:  Blood pressure 130/67, pulse 58, temperature 98 °F (36.7 °C), temperature source Oral, resp. rate 20, SpO2 92%, not currently breastfeeding.          Imaging and other studies: I have personally reviewed pertinent reports.            Plan    Respiratory Plan: (P) Discontinue Protocol        Resp Comments: (P) pt assessed for respiratory protocol at this time, pt admitted for flash pulmonary edema, pt with no prior pulmonary history and does not take any breathing medications at home, pt satting wnl on 2L nasal cannula, bs revealed crackles and CXR confirmed pulmonary edema. no respiratory intervention is  indicated at this time, protocol will be dc.

## 2024-06-14 NOTE — CASE MANAGEMENT
Case Management Assessment & Discharge Planning Note    Patient name Lupe Menjivar  Location [facility]-01 MRN [id number]  : 1965 Date 2024       Current Admission Date: 2024  Current Admission Diagnosis:Flash pulmonary edema (HCC)   Patient Active Problem List    Diagnosis Date Noted Date Diagnosed    Flash pulmonary edema (HCC) 2024     Acute on chronic diastolic heart failure (HCC) 2024     Constipation 2024     Abdominal pain 2024     Fungal skin disease 2024     BRBPR (bright red blood per rectum) 2024     Primary osteoarthritis of first carpometacarpal joint of left hand 2023     Sprain of metacarpophalangeal (MCP) joint of left thumb, initial encounter 2023     HTN (hypertension) 10/05/2023     Benign hypertension with CKD (chronic kidney disease) stage IV (HCC) 2023     Facial numbness, resolved 2023     Anemia due to chronic kidney disease 2023     History of colonic diverticulitis 2023     Tinea 2023     Hypokalemia 2023     Blood in stool 2023     Abnormal finding on CT scan 2023     Chronic pain of both knees 2023     Homeless 2023     Morbid obesity 2023     Anemia in stage 4 chronic kidney disease  2023     Left low back pain 2022     Chronic heart failure with preserved ejection fraction (HCC) 10/13/2022     Acquired hallux valgus of left foot 2022     Renal cyst 2022     Diverticulitis of large intestine without perforation or abscess 2022     Leukopenia 2022     Right renal stone 2022     Hepatitis C 2022     Lumbar radiculopathy 2021     Acute right flank pain 08/15/2021     History of tachycardia induced cardiomyopathy 2021     Cocaine use 2020     Mixed hyperlipidemia 2020     Implantable cardioverter-defibrillator (ICD) in situ 2020     SOB (shortness of breath) 2020      Paroxysmal A-fib (HCC) 05/14/2020     Cocaine abuse ([facility]) 05/14/2020     Elevated lipase 10/10/2019     Nonischemic nontraumatic myocardial injury due to CHF 09/09/2019     Mild acute on chronic diastolic congestive heart failure (HCC) 01/23/2019     Headache 12/26/2018     Stage 4 chronic kidney disease (HCC) 12/26/2018     Tobacco abuse 12/26/2018     History of monomorphic ventricular tachycardia, s/p ICD in 2016 07/13/2016     LVH (left ventricular hypertrophy) 07/12/2016     Obstructive sleep apnea, adult 07/11/2016     Osteoarthritis 07/11/2016     Sciatica 07/11/2016     Chest pain 03/20/2016     Gastroesophageal reflux disease  03/20/2016     Atrial flutter ([facility]) 03/20/2016       LOS (days): 0  Geometric Mean LOS (GMLOS) (days):   Days to GMLOS:     OBJECTIVE:    Risk of Unplanned Readmission Score: 47.97         Current admission status: Inpatient       Preferred Pharmacy:   [id number] - [place] - [address]  [address] [address]  Phone: [phone] Fax: [phone]    [facility] - [place], [place] - [address] [facility]  [address]  [address]  [place] [address]  Phone: [phone] Fax: [phone]    [facility] - [place], [place] - [facility] [facility]  [address] [facility]  [address]  [place] [address]  Phone: [phone] Fax: [phone]    Primary Care Provider: Rhina Pettit MD    Primary Insurance: [employer]  Secondary Insurance:     ASSESSMENT:  Active Health Care Proxies       Menjivar, Raffy Health Care Representative - Spouse   Primary Phone: [phone] (Mobile)  Home Phone: [phone]                           Readmission Root Cause  30 Day Readmission: No    Patient Information  Admitted from:: Home  Mental Status: Alert  During Assessment patient was accompanied by: Not accompanied during assessment  Assessment information provided by:: Patient  Primary Caregiver: Self  Support Systems: Self,  Spouse/significant other  County of Residence: [place]  What city do you live in?: [place]  Home entry access options. Select all that apply.: Stairs  Number of steps to enter home.: 3  Type of Current Residence: Apartment  Floor Level: 1  Living Arrangements: Lives w/ Spouse/significant other  Is patient a ?: No    Activities of Daily Living Prior to Admission  Functional Status: Independent  Completes ADLs independently?: Yes  Ambulates independently?: Yes  Does patient use assisted devices?: Yes  Assisted Devices (DME) used: Wheelchair, Walker, Straight Cane  Does patient currently own DME?: Yes  What DME does the patient currently own?: Walker, Wheelchair, Straight Cane  Does patient have a history of Outpatient Therapy (PT/OT)?: No  Does the patient have a history of Short-Term Rehab?: No  Does patient have a history of HHC?: Yes  Does patient currently have HHC?: No         Patient Information Continued  Income Source: SSI/SSD         Means of Transportation  Means of Transport to Appts:: Drives Self      Social Determinants of Health (SDOH)      Flowsheet Row Most Recent Value   Housing Stability    In the last 12 months, was there a time when you were not able to pay the mortgage or rent on time? N   In the past 12 months, how many times have you moved where you were living? 0   At any time in the past 12 months, were you homeless or living in a shelter (including now)? N   Transportation Needs    In the past 12 months, has lack of transportation kept you from medical appointments or from getting medications? no   In the past 12 months, has lack of transportation kept you from meetings, work, or from getting things needed for daily living? No   Food Insecurity    Within the past 12 months, you worried that your food would run out before you got the money to buy more. Never true   Within the past 12 months, the food you bought just didn't last and you didn't have money to get more. Never true    Utilities    In the past 12 months has the electric, gas, oil, or water company threatened to shut off services in your home? No            DISCHARGE DETAILS:    Discharge planning discussed with:: Chart reviewed. Cm met with pt introduced self, role and discharged process. Pt confirmed indep at baseline. Potential wknd dischagred pending medical clearance. Pt to discharged home with no need and spouse to transport.                      Contacts  Relationship to Patient:: Family  Contact Method: Phone  Reason/Outcome: Discharge Planning

## 2024-06-14 NOTE — ASSESSMENT & PLAN NOTE
Wt Readings from Last 3 Encounters:   04/28/24 99.3 kg (218 lb 14.7 oz)   04/01/24 99.6 kg (219 lb 9.6 oz)   02/20/24 98.2 kg (216 lb 7.9 oz)     Elevated BNP, chest x-ray with evidence of pulmonary edema on my review awaiting official read  Most recent echo in January 2024 with LVEF 50 to 55%, grade 2 diastolic dysfunction  On PTA Toprol 50 mg daily, Bumex 2 mg twice daily and as needed metolazone  See plan above in flash pulmonary edema

## 2024-06-14 NOTE — ASSESSMENT & PLAN NOTE
Progressively worsening shortness of breath throughout the day which became severe this evening  Initial  systolic with respiratory distress, chest x-ray with concern for pulmonary edema on my review awaiting official review, elevated BNP  Patient started on nitroglycerin drip and given IV Lasix 120 mg and initially placed on BiPAP although this was titrated off  Most recent echo in January 2024 with LVEF 50 to 55%, grade 2 diastolic dysfunction; on PTA Bumex 2 mg twice daily as well as as needed metolazone and metoprolol; denies missing any doses; history of monomorphic VT status post ICD in 2016  Troponin 52-->128   EKG AV paced  Admit to medicine on telemetry.  Troponin elevation thought secondary to demand ischemia as opposed to ACS in the setting of flash pulmonary edema.  Trend troponin to peak.  Start IV Lasix 80 mg twice daily and will continue nitro drip overnight.  Order 2D echo.  Consult CV team.  Continue PTA Xarelto as well is Toprol.  I's and O's.  Urinary retention protocol.  Daily weights.  Pacemaker device interrogation ordered.
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
## 2024-06-14 NOTE — ED ATTENDING ATTESTATION
6/13/2024  I, Valeriano Simental DO, saw and evaluated the patient. I have discussed the patient with the resident/non-physician practitioner and agree with the resident's/non-physician practitioner's findings, Plan of Care, and MDM as documented in the resident's/non-physician practitioner's note, except where noted. All available labs and Radiology studies were reviewed.  I was present for key portions of any procedure(s) performed by the resident/non-physician practitioner and I was immediately available to provide assistance.       At this point I agree with the current assessment done in the Emergency Department.  I have conducted an independent evaluation of this patient a history and physical is as follows:    Patient is a 59-year-old female history of hyperlipidemia, hypertension, atrial fibrillation on Xarelto, CHF, coronary disease with a history of a AICD, brought in by EMS.  The patient says about 730 or 8 PM this evening she began feeling short of breath, chest felt somewhat tight, no cough, no fever, no chills, no travel history or sick contacts.  Has been compliant with her Xarelto.  EMS indicates they arrived and found the patient hypoxic, with significant work of breathing and diffuse rales, administered a total of 800 mcg of push dose of nitroglycerin and placed her on noninvasive ventilation.  Her pulse ox improved and she has been feeling better.    General:  Patient appears short of breath  Head:  Atraumatic  Eyes:  Conjunctiva pink  ENT:  Mucous membranes are moist  Neck:  Supple  Cardiac:  S1-S2, without murmurs  Lungs: Diffuse rales in the bases up to about the mid lung fields  Abdomen:  Soft, nontender, normal bowel sounds, no CVA tenderness, no tympany, no rigidity, no guarding  Extremities:  Normal range of motion, no pedal edema or calf asymmetry, radial pulses are equal and symmetric bilaterally  Neurologic:  Awake, fluent speech, normal comprehension, AAOx3  Skin:  Pink warm and  "dry  Psychiatric:  Alert, pleasant, cooperative      ED Course  ED Course as of 06/13/24 2339   u Jun 13, 2024   2327 ECG interpreted by me shows a left bundle branch block pattern, believe is paced, rate of 68, left axis deviation, no acute change from March 31, 2024     Chest x-ray interpreted me shows bilateral pulmonary vascular congestion greatest in the bases.  No pneumothorax    Labs Reviewed   CBC AND DIFFERENTIAL - Abnormal       Result Value Ref Range Status    WBC 9.12  4.31 - 10.16 Thousand/uL Final    RBC 4.11  3.81 - 5.12 Million/uL Final    Hemoglobin 10.8 (*) 11.5 - 15.4 g/dL Final    Hematocrit 36.0  34.8 - 46.1 % Final    MCV 88  82 - 98 fL Final    MCH 26.3 (*) 26.8 - 34.3 pg Final    MCHC 30.0 (*) 31.4 - 37.4 g/dL Final    RDW 15.9 (*) 11.6 - 15.1 % Final    MPV 11.5  8.9 - 12.7 fL Final    Platelets 168  149 - 390 Thousands/uL Final    nRBC 0  /100 WBCs Final    Segmented % 75  43 - 75 % Final    Immature Grans % 1  0 - 2 % Final    Lymphocytes % 19  14 - 44 % Final    Monocytes % 4  4 - 12 % Final    Eosinophils Relative 1  0 - 6 % Final    Basophils Relative 0  0 - 1 % Final    Absolute Neutrophils 6.84  1.85 - 7.62 Thousands/µL Final    Absolute Immature Grans 0.07  0.00 - 0.20 Thousand/uL Final    Absolute Lymphocytes 1.70  0.60 - 4.47 Thousands/µL Final    Absolute Monocytes 0.39  0.17 - 1.22 Thousand/µL Final    Eosinophils Absolute 0.09  0.00 - 0.61 Thousand/µL Final    Basophils Absolute 0.03  0.00 - 0.10 Thousands/µL Final   HS TROPONIN I 0HR - Abnormal    hs TnI 0hr 52 (*) \"Refer to ACS Flowchart\"- see link ng/L Final    Comment:                                              Initial (time 0) result  If >=50 ng/L, Myocardial injury suggested ;  Type of myocardial injury and treatment strategy  to be determined.  If 5-49 ng/L, a delta result at 2 hours and or 4 hours will be needed to further evaluate.  If <4 ng/L, and chest pain has been >3 hours since onset, patient may qualify for " discharge based on the HEART score in the ED.  If <5 ng/L and <3hours since onset of chest pain, a delta result at 2 hours will be needed to further evaluate.    HS Troponin 99th Percentile URL of a Health Population=12 ng/L with a 95% Confidence Interval of 8-18 ng/L.    Second Troponin (time 2 hours)  If calculated delta >= 20 ng/L,  Myocardial injury suggested ; Type of myocardial injury and treatment strategy to be determined.  If 5-49 ng/L and the calculated delta is 5-19 ng/L, consult medical service for evaluation.  Continue evaluation for ischemia on ecg and other possible etiology and repeat hs troponin at 4 hours.  If delta is <5 ng/L at 2 hours, consider discharge based on risk stratification via the HEART score (if in ED), or BRITTA risk score in IP/Observation.    HS Troponin 99th Percentile URL of a Health Population=12 ng/L with a 95% Confidence Interval of 8-18 ng/L.   B-TYPE NATRIURETIC PEPTIDE (BNP) - Abnormal     (*) 0 - 100 pg/mL Final   COMPREHENSIVE METABOLIC PANEL - Abnormal    Sodium 139  135 - 147 mmol/L Final    Potassium 4.4  3.5 - 5.3 mmol/L Final    Chloride 108  96 - 108 mmol/L Final    CO2 21  21 - 32 mmol/L Final    ANION GAP 10  4 - 13 mmol/L Final    BUN 20  5 - 25 mg/dL Final    Creatinine 1.62 (*) 0.60 - 1.30 mg/dL Final    Comment: Standardized to IDMS reference method    Glucose 171 (*) 65 - 140 mg/dL Final    Comment: If the patient is fasting, the ADA then defines impaired fasting glucose as > 100 mg/dL and diabetes as > or equal to 123 mg/dL.    Calcium 8.5  8.4 - 10.2 mg/dL Final    AST 15  13 - 39 U/L Final    ALT 7  7 - 52 U/L Final    Comment: Specimen collection should occur prior to Sulfasalazine administration due to the potential for falsely depressed results.     Alkaline Phosphatase 75  34 - 104 U/L Final    Total Protein 7.2  6.4 - 8.4 g/dL Final    Albumin 4.0  3.5 - 5.0 g/dL Final    Total Bilirubin 0.49  0.20 - 1.00 mg/dL Final    Comment: Use of this assay  "is not recommended for patients undergoing treatment with eltrombopag due to the potential for falsely elevated results.  N-acetyl-p-benzoquinone imine (metabolite of Acetaminophen) will generate erroneously low results in samples for patients that have taken an overdose of Acetaminophen.    eGFR 34  ml/min/1.73sq m Final    Narrative:     [employer] guidelines for Chronic Kidney Disease (CKD):     Stage 1 with normal or high GFR (GFR > 90 mL/min/1.73 square meters)    Stage 2 Mild CKD (GFR = 60-89 mL/min/1.73 square meters)    Stage 3A Moderate CKD (GFR = 45-59 mL/min/1.73 square meters)    Stage 3B Moderate CKD (GFR = 30-44 mL/min/1.73 square meters)    Stage 4 Severe CKD (GFR = 15-29 mL/min/1.73 square meters)    Stage 5 End Stage CKD (GFR <15 mL/min/1.73 square meters)  Note: GFR calculation is accurate only with a steady state creatinine   HS TROPONIN I 2HR - Abnormal    hs TnI 2hr 128 (*) \"Refer to ACS Flowchart\"- see link ng/L Final    Comment:                                              Initial (time 0) result  If >=50 ng/L, Myocardial injury suggested ;  Type of myocardial injury and treatment strategy  to be determined.  If 5-49 ng/L, a delta result at 2 hours and or 4 hours will be needed to further evaluate.  If <4 ng/L, and chest pain has been >3 hours since onset, patient may qualify for discharge based on the HEART score in the ED.  If <5 ng/L and <3hours since onset of chest pain, a delta result at 2 hours will be needed to further evaluate.    HS Troponin 99th Percentile URL of a Health Population=12 ng/L with a 95% Confidence Interval of 8-18 ng/L.    Second Troponin (time 2 hours)  If calculated delta >= 20 ng/L,  Myocardial injury suggested ; Type of myocardial injury and treatment strategy to be determined.  If 5-49 ng/L and the calculated delta is 5-19 ng/L, consult medical service for evaluation.  Continue evaluation for ischemia on ecg and other possible etiology and " repeat hs troponin at 4 hours.  If delta is <5 ng/L at 2 hours, consider discharge based on risk stratification via the HEART score (if in ED), or BRITTA risk score in IP/Observation.    HS Troponin 99th Percentile URL of a Health Population=12 ng/L with a 95% Confidence Interval of 8-18 ng/L.    Delta 2hr hsTnI 76 (*) <20 ng/L Final   POCT BLOOD GAS (CG8+) - Abnormal    ph, Luis Fernando ISTAT 7.246 (*) 7.300 - 7.400 Final    pCO2, Luis Fernando i-STAT 50.5 (*) 42.0 - 50.0 mm HG Final    pO2, Luis Fernando i-STAT 80.0 (*) 35.0 - 45.0 mm HG Final    BE, i-STAT -6 (*) -2 - 3 mmol/L Final    HCO3, Luis Fernando i-STAT 21.9 (*) 24.0 - 30.0 mmol/L Final    CO2, i-STAT 23  21 - 32 mmol/L Final    O2 Sat, i-STAT 93 (*) 60 - 85 % Final    SODIUM, I-STAT 141  136 - 145 mmol/l Final    Potassium, i-STAT 4.3  3.5 - 5.3 mmol/L Final    Calcium, Ionized i-STAT 1.15  1.12 - 1.32 mmol/L Final    Hct, i-STAT 34 (*) 34.8 - 46.1 % Final    Hgb, i-STAT 11.6  11.5 - 15.4 g/dl Final    Glucose, i-STAT 196 (*) 65 - 140 mg/dl Final    Specimen Type VENOUS   Final   PROCALCITONIN TEST - Normal    Procalcitonin <0.05  <=0.25 ng/ml Final    Comment: Suspected Lower Respiratory Tract Infection (LRTI):  - LESS than or EQUAL to 0.25 ng/mL:   low likelihood for bacterial LRTI; antibiotics DISCOURAGED.  - GREATER than 0.25 ng/mL:   increased likelihood for bacterial LRTI; antibiotics ENCOURAGED.    Suspected Sepsis:  - Strongly consider initiating antibiotics in ALL UNSTABLE patients.  - LESS than or EQUAL to 0.5 ng/mL:   low likelihood for bacterial sepsis; antibiotics DISCOURAGED.  - GREATER than 0.5 ng/mL:   increased likelihood for bacterial sepsis; antibiotics ENCOURAGED.  - GREATER than 2 ng/mL:   high risk for severe sepsis / septic shock; antibiotics strongly ENCOURAGED.    Decisions on antibiotic use should not be based solely on Procalcitonin (PCT) levels. If PCT is low but uncertainty exists with stopping antibiotics, repeat PCT in 6-24 hours to confirm the low level. If  antibiotics are administered (regardless if initial PCT was high or low), repeat PCT every 1-2 days to consider early antibiotic cessation (when GREATER than 80% decrease from the peak OR when PCT drops below designated cutoffs, whichever comes first), so long as the infection is NOT one that typically requires prolonged treatment durations (e.g., bone/joint infections, endocarditis, Staph. aureus bacteremia).    Situations of FALSE-POSITIVE Procalcitonin values:  1) Newborns < 72 hours old  2) Massive stress from severe trauma / burns, major surgery, acute pancreatitis, cardiogenic / hemorrhagic shock, sickle cell crisis, or other organ perfusion abnormalities  3) Malaria and some Candidal infections  4) Treatment with agents that stimulate cytokines (e.g., OKT3, anti-lymphocyte globulins, alemtuzumab, IL-2, granulocyte transfusion [NOT GCSFs])  5) Chronic renal disease causes elevated baseline levels (consider GREATER than 0.75 ng/mL as an abnormal cut-off); initiating HD/CRRT may cause transient decreases  6) Paraneoplastic syndromes from medullary thyroid or SCLC, some forms of vasculitis, and acute stqmg-ro-vpen disease    Situations of FALSE-NEGATIVE Procalcitonin values:  1) Too early in clinical course for PCT to have reached its peak (may repeat in 6-24 hours to confirm low level)  2) Localized infection WITHOUT systemic (SIRS / sepsis) response (e.g., an abscess, osteomyelitis, cystitis)  3) Mycobacteria (e.g., Tuberculosis, MAC)  4) Cystic fibrosis exacerbations     BLOOD GAS, ARTERIAL   HS TROPONIN I 4HR       Patient presents with symptoms concerning for sudden acute pulmonary edema. Patient arrived, immediately transition to hospital BiPAP, started on nitroglycerin drip.  Patient reassessed multiple times, feeling significantly better, increased air movement, much better symptoms.  Chest x-ray shows no evidence of pneumonia or pneumothorax, laboratory studies show elevated BNP, elevated cardiac  biomarkers.  Given her anticoagulant usage,I do not believe this patient's complaints are from pulmonary embolism and I believe they would most likely be harmed through false positive test results and other complications of testing by further pursuing the diagnosis of pulmonary embolism.  Case discussed with ICU intensivist Dr. Mullen who indicated he believe the patient was appropriate for level to stepdown.      The patient was evaluated for sepsis in the emergency department. After that assessment, at the time of admission, no sepsis, severe sepsis, or septic shock was found.    DIAGNOSIS:  Acute pulmonary edema, acute CHF exacerbation, acute respiratory failure    MEDICAL DECISION MAKING CODING    COLLECTION AND INTERPRETATION OF DATA  I reviewed prior external notes, including March 31, 2024 ECG    I ordered each unique test  Tests reviewed personally by me:  ECG: See my ED course  Labs: See above  Imaging: I independently interpreted the see above.            Critical Care Time  CriticalCare Time    Date/Time: 6/14/2024 2:59 AM    Performed by: Valeriano Simental DO  Authorized by: Valeriano Simental DO    Critical care provider statement:     Critical care time (minutes):  32    Critical care time was exclusive of:  Separately billable procedures and treating other patients and teaching time    Critical care was necessary to treat or prevent imminent or life-threatening deterioration of the following conditions:  Respiratory failure and cardiac failure    Critical care was time spent personally by me on the following activities:  Blood draw for specimens, obtaining history from patient or surrogate, development of treatment plan with patient or surrogate, discussions with consultants, discussions with primary provider, evaluation of patient's response to treatment, examination of patient, review of old charts, re-evaluation of patient's condition, ventilator management, ordering and review of radiographic  studies, ordering and review of laboratory studies and ordering and performing treatments and interventions    I assumed direction of critical care for this patient from another provider in my specialty: no

## 2024-06-14 NOTE — QUICK NOTE
Chart reviewed from overnight.  Patient presented with hypertensive urgency with noted elevated blood pressure and flash pulmonary edema.  Blood pressure appears to have stabilized.  Patient given aggressive diuresis with initial BiPAP placement which was removed.  Continue to monitor diuresis.  Consult cardiology continue to wean off the nitroglycerin drip  Volume status improving  Patient complaining of headaches possibly related to nitro.  Will start with as needed Tylenol.  Monitor symptoms closely.  Recheck a.m. labs
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
## 2024-06-15 LAB
ANION GAP SERPL CALCULATED.3IONS-SCNC: 9 MMOL/L (ref 4–13)
BASOPHILS # BLD AUTO: 0.01 THOUSANDS/ÂΜL (ref 0–0.1)
BASOPHILS NFR BLD AUTO: 0 % (ref 0–1)
BUN SERPL-MCNC: 20 MG/DL (ref 5–25)
CALCIUM SERPL-MCNC: 8.6 MG/DL (ref 8.4–10.2)
CHLORIDE SERPL-SCNC: 102 MMOL/L (ref 96–108)
CO2 SERPL-SCNC: 30 MMOL/L (ref 21–32)
CREAT SERPL-MCNC: 1.66 MG/DL (ref 0.6–1.3)
EOSINOPHIL # BLD AUTO: 0.07 THOUSAND/ÂΜL (ref 0–0.61)
EOSINOPHIL NFR BLD AUTO: 2 % (ref 0–6)
ERYTHROCYTE [DISTWIDTH] IN BLOOD BY AUTOMATED COUNT: 15.7 % (ref 11.6–15.1)
GFR SERPL CREATININE-BSD FRML MDRD: 33 ML/MIN/1.73SQ M
GLUCOSE SERPL-MCNC: 101 MG/DL (ref 65–140)
GLUCOSE SERPL-MCNC: 102 MG/DL (ref 65–140)
GLUCOSE SERPL-MCNC: 79 MG/DL (ref 65–140)
GLUCOSE SERPL-MCNC: 88 MG/DL (ref 65–140)
GLUCOSE SERPL-MCNC: 97 MG/DL (ref 65–140)
HCT VFR BLD AUTO: 35.3 % (ref 34.8–46.1)
HGB BLD-MCNC: 10.7 G/DL (ref 11.5–15.4)
IMM GRANULOCYTES # BLD AUTO: 0.02 THOUSAND/UL (ref 0–0.2)
IMM GRANULOCYTES NFR BLD AUTO: 1 % (ref 0–2)
LYMPHOCYTES # BLD AUTO: 0.51 THOUSANDS/ÂΜL (ref 0.6–4.47)
LYMPHOCYTES NFR BLD AUTO: 12 % (ref 14–44)
MCH RBC QN AUTO: 25.5 PG (ref 26.8–34.3)
MCHC RBC AUTO-ENTMCNC: 30.3 G/DL (ref 31.4–37.4)
MCV RBC AUTO: 84 FL (ref 82–98)
MONOCYTES # BLD AUTO: 0.34 THOUSAND/ÂΜL (ref 0.17–1.22)
MONOCYTES NFR BLD AUTO: 8 % (ref 4–12)
NEUTROPHILS # BLD AUTO: 3.25 THOUSANDS/ÂΜL (ref 1.85–7.62)
NEUTS SEG NFR BLD AUTO: 77 % (ref 43–75)
NRBC BLD AUTO-RTO: 0 /100 WBCS
PLATELET # BLD AUTO: 110 THOUSANDS/UL (ref 149–390)
PMV BLD AUTO: 11.8 FL (ref 8.9–12.7)
POTASSIUM SERPL-SCNC: 3.7 MMOL/L (ref 3.5–5.3)
RBC # BLD AUTO: 4.2 MILLION/UL (ref 3.81–5.12)
SODIUM SERPL-SCNC: 141 MMOL/L (ref 135–147)
WBC # BLD AUTO: 4.2 THOUSAND/UL (ref 4.31–10.16)

## 2024-06-15 PROCEDURE — 82948 REAGENT STRIP/BLOOD GLUCOSE: CPT

## 2024-06-15 PROCEDURE — 99232 SBSQ HOSP IP/OBS MODERATE 35: CPT | Performed by: INTERNAL MEDICINE

## 2024-06-15 PROCEDURE — 99233 SBSQ HOSP IP/OBS HIGH 50: CPT | Performed by: INTERNAL MEDICINE

## 2024-06-15 PROCEDURE — 80048 BASIC METABOLIC PNL TOTAL CA: CPT | Performed by: INTERNAL MEDICINE

## 2024-06-15 PROCEDURE — 85025 COMPLETE CBC W/AUTO DIFF WBC: CPT | Performed by: INTERNAL MEDICINE

## 2024-06-15 PROCEDURE — 93005 ELECTROCARDIOGRAM TRACING: CPT

## 2024-06-15 RX ORDER — HYDRALAZINE HYDROCHLORIDE 20 MG/ML
5 INJECTION INTRAMUSCULAR; INTRAVENOUS EVERY 6 HOURS PRN
Status: DISCONTINUED | OUTPATIENT
Start: 2024-06-15 | End: 2024-06-17 | Stop reason: HOSPADM

## 2024-06-15 RX ORDER — SUMATRIPTAN 6 MG/.5ML
6 INJECTION, SOLUTION SUBCUTANEOUS
Status: DISCONTINUED | OUTPATIENT
Start: 2024-06-15 | End: 2024-06-17 | Stop reason: HOSPADM

## 2024-06-15 RX ORDER — MAGNESIUM SULFATE HEPTAHYDRATE 40 MG/ML
2 INJECTION, SOLUTION INTRAVENOUS EVERY 24 HOURS
Status: DISCONTINUED | OUTPATIENT
Start: 2024-06-15 | End: 2024-06-17 | Stop reason: HOSPADM

## 2024-06-15 RX ORDER — AMLODIPINE BESYLATE 5 MG/1
5 TABLET ORAL 2 TIMES DAILY
Status: DISCONTINUED | OUTPATIENT
Start: 2024-06-15 | End: 2024-06-17 | Stop reason: HOSPADM

## 2024-06-15 RX ORDER — SODIUM CHLORIDE 9 MG/ML
100 INJECTION, SOLUTION INTRAVENOUS ONCE
Status: COMPLETED | OUTPATIENT
Start: 2024-06-15 | End: 2024-06-15

## 2024-06-15 RX ORDER — DIPHENHYDRAMINE HYDROCHLORIDE 50 MG/ML
25 INJECTION INTRAMUSCULAR; INTRAVENOUS EVERY 8 HOURS SCHEDULED
Status: DISCONTINUED | OUTPATIENT
Start: 2024-06-15 | End: 2024-06-17 | Stop reason: HOSPADM

## 2024-06-15 RX ORDER — METOCLOPRAMIDE HYDROCHLORIDE 5 MG/ML
10 INJECTION INTRAMUSCULAR; INTRAVENOUS EVERY 8 HOURS SCHEDULED
Status: DISCONTINUED | OUTPATIENT
Start: 2024-06-15 | End: 2024-06-17 | Stop reason: HOSPADM

## 2024-06-15 RX ADMIN — AMLODIPINE BESYLATE 5 MG: 5 TABLET ORAL at 09:05

## 2024-06-15 RX ADMIN — BUMETANIDE 3 MG: 0.25 INJECTION INTRAMUSCULAR; INTRAVENOUS at 09:04

## 2024-06-15 RX ADMIN — MAGNESIUM SULFATE HEPTAHYDRATE 2 G: 40 INJECTION, SOLUTION INTRAVENOUS at 13:48

## 2024-06-15 RX ADMIN — METOCLOPRAMIDE HYDROCHLORIDE 10 MG: 5 INJECTION INTRAMUSCULAR; INTRAVENOUS at 14:55

## 2024-06-15 RX ADMIN — HYDRALAZINE HYDROCHLORIDE 5 MG: 20 INJECTION INTRAMUSCULAR; INTRAVENOUS at 11:27

## 2024-06-15 RX ADMIN — PANTOPRAZOLE SODIUM 40 MG: 40 TABLET, DELAYED RELEASE ORAL at 06:03

## 2024-06-15 RX ADMIN — OXYCODONE HYDROCHLORIDE 5 MG: 5 TABLET ORAL at 09:04

## 2024-06-15 RX ADMIN — DIPHENHYDRAMINE HYDROCHLORIDE 25 MG: 50 INJECTION, SOLUTION INTRAMUSCULAR; INTRAVENOUS at 21:07

## 2024-06-15 RX ADMIN — DIPHENHYDRAMINE HYDROCHLORIDE 25 MG: 50 INJECTION, SOLUTION INTRAMUSCULAR; INTRAVENOUS at 14:55

## 2024-06-15 RX ADMIN — AMLODIPINE BESYLATE 5 MG: 5 TABLET ORAL at 20:57

## 2024-06-15 RX ADMIN — RIVAROXABAN 15 MG: 15 TABLET, FILM COATED ORAL at 17:37

## 2024-06-15 RX ADMIN — METOCLOPRAMIDE HYDROCHLORIDE 10 MG: 5 INJECTION INTRAMUSCULAR; INTRAVENOUS at 21:07

## 2024-06-15 RX ADMIN — METOPROLOL SUCCINATE 50 MG: 50 TABLET, EXTENDED RELEASE ORAL at 09:06

## 2024-06-15 RX ADMIN — BUMETANIDE 3 MG: 0.25 INJECTION INTRAMUSCULAR; INTRAVENOUS at 17:37

## 2024-06-15 RX ADMIN — NICOTINE 1 PATCH: 21 PATCH, EXTENDED RELEASE TRANSDERMAL at 09:05

## 2024-06-15 RX ADMIN — SODIUM CHLORIDE 100 ML/HR: 0.9 INJECTION, SOLUTION INTRAVENOUS at 13:48

## 2024-06-15 NOTE — ASSESSMENT & PLAN NOTE
Wt Readings from Last 3 Encounters:   06/15/24 103 kg (227 lb 4.7 oz)   04/28/24 99.3 kg (218 lb 14.7 oz)   04/01/24 99.6 kg (219 lb 9.6 oz)     Elevated BNP, chest x-ray with evidence of pulmonary edema on my review awaiting official read  Most recent echo in January 2024 with LVEF 50 to 55%, grade 2 diastolic dysfunction  On PTA Toprol 50 mg daily, Bumex 2 mg twice daily and as needed metolazone  See plan above in flash pulmonary edema

## 2024-06-15 NOTE — UTILIZATION REVIEW
Initial Clinical Review    Admission: Date/Time/Statement:   Admission Orders (From admission, onward)       Ordered        06/14/24 0151  Inpatient Admission  Once                          Orders Placed This Encounter   Procedures    Inpatient Admission     Standing Status:   Standing     Number of Occurrences:   1     Order Specific Question:   Level of Care     Answer:   Level 2 Stepdown / HOT [14]     Order Specific Question:   Estimated length of stay     Answer:   More than 2 Midnights     Order Specific Question:   Certification     Answer:   I certify that inpatient services are medically necessary for this patient for a duration of greater than two midnights. See H&P and MD Progress Notes for additional information about the patient's course of treatment.     ED Arrival Information       Expected   -    Arrival   6/13/2024 23:11    Acuity   Emergent              Means of arrival   Ambulance    Escorted by   [facility]    Service   Hospitalist    Admission type   Emergency              Arrival complaint   Respiratory Distress             Chief Complaint   Patient presents with    Shortness of Breath     Pt arriving from home, CHF hx, 800 mcg nitro given via EMS, O2 on RA 70's per EMS, 90's on CPAP  SOB starting a couple hours ago while laying in bed, pt reports CP       Initial Presentation: 59 y.o. female presents to ED via  EMS  from home after waking the morning of admission and had a  gradual onset of  SOB,  became worse  during the day.  Became severe and called  EMS.  Initially placed on  BiPap, SBP  in the  230's.   Started on  IV  NTG in ED and given  IV  lasix.  Symptoms improved in ED  and  titrated off BiPap.  Denies chest pain.  Feels  breathing  significantly improved  with ED treatment.  PMH is   diastolic heart failure,  VT, S/P  ICD, aflutter  S/P ablation, CKD, Htn and  cocaine abuse. CXR  shows concern  for pulmonary edema.   EKG  AV paced.  Admit  iP   with  Flash pulmonary  edema,   Acute/chronic diastolic heart failure  and plan is  tele, IV  lasix, trend troponin,  cardiology consult, daily weight, 2 DE  and initially  on BiPap  but titrated off.      Cardiology  consult  Non  MI  related   troponin elevation.  Paced rhythm on tele.  Wait  2  DE.   Continue  IV lasix and bumex.   Complains of headache, denies chest pain.   Volume status  improving.    Anticipated Length of Stay/Certification Statement: Patient will be admitted on an Inpatient basis with an anticipated length of stay of  > 2 midnights.        Date:   6/15   Day 2:   Feels much improved and denies chest pain, palpitations  or  SOB.  Decreased breath sounds  noted.   Baseline weight   216,  now  227.  Continue to monitor labs.  [id number]   score  4.   Tele shows  AV paced.  Continue  IV  diuretics.    ED Triage Vitals   Temperature Pulse Respirations Blood Pressure SpO2 Pain Score   06/13/24 2315 06/13/24 2315 06/13/24 2315 06/13/24 2315 06/13/24 2315 06/14/24 0127   98 °F (36.7 °C) 65 (!) 24 (!) 265/128 95 % 8     Weight (last 2 days)       Date/Time Weight    06/15/24 0600 103 (227.29)    06/14/24 1116 104 (230)    06/14/24 05:24:55 105 (230.82)            Vital Signs (last 3 days)       Date/Time Temp Pulse Resp BP MAP (mmHg) SpO2 Calculated FIO2 (%) - Nasal Cannula Nasal Cannula O2 Flow Rate (L/min) O2 Device O2 Interface Device Patient Position - Orthostatic VS Pain    06/15/24 10:58:08 -- 60 -- 177/97 124 94 % -- -- -- -- -- --    06/15/24 0904 -- 62 -- 150/85 -- 94 % -- -- -- -- -- 9    06/15/24 08:28:44 98.9 °F (37.2 °C) 65 20 154/88 110 94 % -- -- None (Room air) -- Lying --    06/15/24 03:49:35 98.3 °F (36.8 °C) 64 18 143/79 100 92 % -- -- None (Room air) -- Lying --    06/14/24 2333 -- 60 -- -- -- 90 % -- -- -- -- -- --    06/14/24 22:25:45 97.7 °F (36.5 °C) 61 16 144/81 102 89 % -- -- -- -- -- --    06/14/24 2100 -- -- -- -- -- 94 % -- -- None (Room air) -- -- 9    06/14/24 19:10:27 97.4 °F (36.3 °C) 67  16 141/81 101 91 % -- -- -- -- -- --    06/14/24 1900 -- -- -- -- -- -- -- -- None (Room air) -- -- 9 06/14/24 1712 -- -- -- -- -- -- -- -- -- -- -- 8 06/14/24 16:02:41 98.3 °F (36.8 °C) 60 16 138/84 102 92 % -- -- -- -- -- --    06/14/24 1500 -- 86 -- -- -- 93 % -- -- -- -- -- --    06/14/24 1400 -- 74 -- -- -- 92 % -- -- -- -- -- --    06/14/24 1330 -- 62 -- 154/84 107 92 % -- -- -- -- -- --    06/14/24 1325 -- -- -- -- -- -- -- -- -- -- -- 5 06/14/24 13:15:45 -- 60 -- 104/58 73 91 % -- -- -- -- -- --    06/14/24 1315 -- -- -- 104/58 -- -- -- -- -- -- -- --    06/14/24 1230 -- 60 -- 102/57 72 90 % -- -- -- -- Lying --    06/14/24 12:03:30 97.5 °F (36.4 °C) 59 19 131/71 91 91 % -- -- -- -- -- --    06/14/24 11:33:16 96.9 °F (36.1 °C) 62 19 133/71 92 93 % -- -- -- -- -- --    06/14/24 11:27:23 -- 62 -- 138/70 93 95 % -- -- -- -- Lying --    06/14/24 1116 -- 62 -- 133/71 -- -- -- -- -- -- -- --    06/14/24 1107 -- -- -- -- -- -- -- -- -- -- -- 8    06/14/24 11:03:52 97.9 °F (36.6 °C) 62 15 128/71 90 92 % -- -- -- -- -- --    06/14/24 0916 -- -- -- -- -- -- -- -- -- -- -- 8    06/14/24 0907 -- 63 -- 137/72 -- -- -- -- -- -- -- --    06/14/24 07:41:33 98 °F (36.7 °C) 60 16 121/69 86 94 % -- -- -- -- -- --    06/14/24 0630 -- 60 -- 131/74 93 94 % -- -- -- -- -- --    06/14/24 0615 -- 60 -- 137/78 98 93 % -- -- -- -- -- --    06/14/24 0611 -- -- -- -- -- -- -- -- -- -- -- 10 - Worst Possible Pain    06/14/24 0600 -- 60 -- 143/82 102 92 % -- -- -- -- -- --    06/14/24 0545 -- 60 -- 131/75 94 93 % -- -- -- -- -- --    06/14/24 0530 -- 61 -- 130/95 107 92 % 24 1 L/min Nasal cannula -- -- --    06/14/24 05:24:55 97.7 °F (36.5 °C) 74 16 130/95 107 89 % 24 1 L/min Nasal cannula -- -- No Pain    06/14/24 0445 -- 58 -- -- -- 94 % -- -- -- -- -- --    06/14/24 0430 -- 58 -- 116/61 81 95 % -- -- -- -- -- --    06/14/24 0415 -- 58 -- 106/62 79 95 % -- -- -- -- -- --    06/14/24 0400 -- 58 -- 111/68 85 94 % -- -- -- -- --  --    06/14/24 0300 -- 58 20 130/67 93 92 % 28 2 L/min Nasal cannula -- Sitting --    06/14/24 0145 -- 60 -- 158/75 108 94 % 28 2 L/min Nasal cannula -- Sitting --    06/14/24 0130 -- 62 20 131/75 98 98 % -- -- None (Room air) -- Sitting --    06/14/24 0127 -- -- -- -- -- -- -- -- -- -- -- 8    06/14/24 0124 -- 61 -- 131/75 -- -- -- -- -- -- -- --    06/14/24 0104 -- 60 20 148/88 113 99 % -- -- BiPAP -- Sitting --    06/14/24 0055 -- 59 -- 165/101 128 98 % -- -- BiPAP -- Sitting --    06/14/24 0045 -- 60 -- 192/102 -- -- -- -- -- -- -- --    06/14/24 0030 -- 58 -- 167/100 127 97 % -- -- BiPAP -- Sitting --    06/14/24 0022 -- 62 -- 163/91 119 95 % -- -- -- -- -- --    06/14/24 0017 -- 62 -- 170/120 140 96 % -- -- -- -- -- --    06/14/24 0005 -- 63 -- 191/91 -- -- -- -- -- -- -- --    06/14/24 0000 -- 62 22 168/96 128 95 % -- -- BiPAP -- Sitting --    06/13/24 2351 -- 68 -- 198/95 -- -- -- -- -- -- -- --    06/13/24 2346 -- 64 -- 196/99 -- -- -- -- -- -- -- --    06/13/24 2337 -- 65 -- 218/113 -- -- -- -- -- -- -- --    06/13/24 2332 -- -- -- -- -- 97 % -- -- -- Face mask -- --    06/13/24 2315 98 °F (36.7 °C) 65 24 265/128 183 95 % -- -- BiPAP -- Sitting --              Pertinent Labs/Diagnostic Test Results:   Radiology:  XR chest 1 view portable   Final Interpretation by Danie Mixon MD (06/14 7963)      Appearance most compatible with pulmonary edema, infection not excluded.      Workstation performed: [id number]           Cardiology:  Echo complete w/ contrast if indicated   Final Result by Jhoan Major MD (06/14 6803)        Left Ventricle: Left ventricular cavity size is normal. Wall thickness    is increased. There is moderate to severe asymmetric hypertrophy. The left    ventricular ejection fraction is 60%. Systolic function is mildly reduced.    Wall motion is normal. Diastolic function is moderately abnormal,    consistent with grade II (pseudonormal) relaxation.     IVS: There is abnormal  septal motion consistent with right ventricular    pacing.     Left Atrium: The atrium is mildly dilated (35-41 mL/m2).     Right Atrium: The atrium is mildly dilated.     Aortic Valve: The aortic valve is trileaflet. The leaflets are mildly    thickened. The leaflets are moderately calcified. There is aortic valve    sclerosis. The aortic valve velocity is increased due to increased flow.    The highest velocity at rest was 2 m/s.     Tricuspid Valve: There is mild regurgitation.     Aorta: The aortic root is normal in size. The ascending aorta is normal    in size. The aortic root is 3.40 cm. The ascending aorta is 3.6 cm.     Prior TTE study available for comparison. Prior study date: 8/9/2023.    No significant changes noted compared to the prior study.         ECG 12 lead   Final Result by Fredis Diamond MD (06/14 0512)   Normal sinus rhythm with ventricular pacing   Confirmed by Fredis Diamond ([id number]) on 6/14/2024 5:12:15 AM          Results from last 7 days   Lab Units 06/15/24  0503 06/13/24  2327 06/13/24  2322   WBC Thousand/uL 4.20* 9.12  --    HEMOGLOBIN g/dL 10.7* 10.8*  --    I STAT HEMOGLOBIN g/dl  --   --  11.6   HEMATOCRIT % 35.3 36.0  --    HEMATOCRIT, ISTAT %  --   --  34*   PLATELETS Thousands/uL 110* 168  --    TOTAL NEUT ABS Thousands/µL 3.25 6.84  --          Results from last 7 days   Lab Units 06/15/24  0503 06/14/24  0409 06/13/24 2327 06/13/24 2322   SODIUM mmol/L 141 139 139  --    POTASSIUM mmol/L 3.7 4.9 4.4  --    CHLORIDE mmol/L 102 106 108  --    CO2 mmol/L 30 27 21  --    CO2, I-STAT mmol/L  --   --   --  23   ANION GAP mmol/L 9 6 10  --    BUN mg/dL 20 21 20  --    CREATININE mg/dL 1.66* 1.60* 1.62*  --    EGFR ml/min/1.73sq m 33 35 34  --    CALCIUM mg/dL 8.6 8.2* 8.5  --    CALCIUM, IONIZED, ISTAT mmol/L  --   --   --  1.15   MAGNESIUM mg/dL  --  2.1  --   --      Results from last 7 days   Lab Units 06/13/24  2327   AST U/L 15   ALT U/L 7   ALK PHOS U/L 75   TOTAL  PROTEIN g/dL 7.2   ALBUMIN g/dL 4.0   TOTAL BILIRUBIN mg/dL 0.49     Results from last 7 days   Lab Units 06/15/24  1056 06/15/24  0548 06/14/24  2058 06/14/24  1601 06/14/24  1103 06/14/24  0526   POC GLUCOSE mg/dl 102 97 137 99 78 117     Results from last 7 days   Lab Units 06/15/24  0503 06/14/24  0409 06/13/24  2327   GLUCOSE RANDOM mg/dL 88 113 171*           Results from last 7 days   Lab Units 06/13/24  2322   PH, TINA I-STAT  7.246*   PCO2, TINA ISTAT mm HG 50.5*   PO2, TINA ISTAT mm HG 80.0*   HCO3, TINA ISTAT mmol/L 21.9*   I STAT BASE EXC mmol/L -6*   I STAT O2 SAT % 93*         Results from last 7 days   Lab Units 06/14/24  0316 06/14/24  0121 06/13/24  2327   HS TNI 0HR ng/L  --   --  52*   HS TNI 2HR ng/L  --  128*  --    HSTNI D2 ng/L  --  76*  --    HS TNI 4HR ng/L 176*  --   --    HSTNI D4 ng/L 124*  --   --                  Results from last 7 days   Lab Units 06/13/24  2327   PROCALCITONIN ng/ml <0.05                 Results from last 7 days   Lab Units 06/13/24  2327   BNP pg/mL 926*         Results from last 7 days   Lab Units 06/14/24  0409   AMPH/METH  Negative   BARBITURATE UR  Negative   BENZODIAZEPINE UR  Negative   COCAINE UR  Negative   METHADONE URINE  Negative   OPIATE UR  Negative   PCP UR  Negative   THC UR  Negative             ED Treatment-Medication Administration from 06/13/2024 2311 to 06/14/2024 0514         Date/Time Order Dose Route Action     06/13/2024 2331 ondansetron (FOR EMS ONLY) (ZOFRAN) 4 mg/2 mL injection 4 mg 0 mg Does not apply Given to EMS     06/13/2024 2337 nitroGLYcerin (TRIDIL) 50 mg in 250 mL infusion (premix) 50 mcg/min Intravenous New Bag     06/13/2024 2346 nitroGLYcerin (TRIDIL) 50 mg in 250 mL infusion (premix) 60 mcg/min Intravenous Rate/Dose Change     06/13/2024 2351 nitroGLYcerin (TRIDIL) 50 mg in 250 mL infusion (premix) 70 mcg/min Intravenous Rate/Dose Change     06/14/2024 0005 nitroGLYcerin (TRIDIL) 50 mg in 250 mL infusion (premix) 80 mcg/min  Intravenous Rate/Dose Change     06/14/2024 0045 nitroGLYcerin (TRIDIL) 50 mg in 250 mL infusion (premix) 90 mcg/min Intravenous Rate/Dose Change     06/14/2024 0124 nitroGLYcerin (TRIDIL) 50 mg in 250 mL infusion (premix) 80 mcg/min Intravenous Rate/Dose Change     06/14/2024 0456 nitroGLYcerin (TRIDIL) 50 mg in 250 mL infusion (premix) 80 mcg/min Intravenous Continue to Inpatient Floor     06/14/2024 0036 furosemide (LASIX) 120 mg in dextrose 5 % 50 mL IVPB 120 mg Intravenous New Bag     06/14/2024 0127 acetaminophen (TYLENOL) tablet 975 mg 975 mg Oral Given              Present on Admission:   Flash pulmonary edema ([facility])   Atrial flutter ([facility])   Cocaine abuse ([facility])   Benign hypertension with CKD (chronic kidney disease) stage IV ([facility])      Admitting Diagnosis: CHF (congestive heart failure) ([facility]) [I50.9]  Dyspnea [R06.00]  Flash pulmonary edema ([facility]) [J81.0]  Age/Sex: 59 y.o. female  Admission Orders:  Scheduled Medications:  amLODIPine, 5 mg, Oral, Daily  bumetanide, 3 mg, Intravenous, BID (diuretic)  ferrous gluconate, 162 mg, Oral, Once per day on Monday Wednesday Friday  metoprolol succinate, 50 mg, Oral, Daily  nicotine, 1 patch, Transdermal, Daily  pantoprazole, 40 mg, Oral, Early Morning  rivaroxaban, 15 mg, Oral, QPM      Continuous IV Infusions:  nitroGLYcerin, 50 mcg/min, Intravenous, Titrated      PRN Meds:  acetaminophen, 650 mg, Oral, Q6H PRN  butalbital-acetaminophen-caffeine, 1 tablet, Oral, BID PRN  hydrALAZINE, 5 mg, Intravenous, Q6H PRN  oxyCODONE, 5 mg, Oral, Q4H PRN        IP CONSULT TO CARDIOLOGY    Network Utilization Review Department  ATTENTION: Please call with any questions or concerns to [phone] and carefully listen to the prompts so that you are directed to the right person. All voicemails are confidential.   For Discharge needs, contact Care Management DC Support Team at [phone] opt. [phone]  Send all requests for admission clinical reviews, approved or denied determinations and  any other requests to dedicated fax number below belonging to the campus where the patient is receiving treatment. List of dedicated fax numbers for the Facilities:  FACILITY NAME UR FAX NUMBER   ADMISSION DENIALS (Administrative/Medical Necessity) [phone]   DISCHARGE SUPPORT TEAM (NETWORK) [phone]   PARENT CHILD HEALTH (Maternity/NICU/Pediatrics) [phone]   [facility] [phone]   [facility] [phone]   [facility] [phone]   [facility] [phone]   [facility] [phone]   [facility] [phone]   [facility] [phone]   [facility] [phone]   [facility] [phone]   [facility] [phone]   [facility] [phone]   [facility] [phone]

## 2024-06-15 NOTE — PROGRESS NOTES
[facility]  Progress Note  Name: Lupe Menjivar I  MRN: [id number]  Unit/Bed#: [facility]PHP 422-01 I Date of Admission: 6/13/2024   Date of Service: 6/15/2024 I Hospital Day: 1    Assessment & Plan   * Acute on chronic diastolic heart failure (HCC)  Assessment & Plan  Wt Readings from Last 3 Encounters:   06/15/24 103 kg (227 lb 4.7 oz)   04/28/24 99.3 kg (218 lb 14.7 oz)   04/01/24 99.6 kg (219 lb 9.6 oz)     Elevated BNP, chest x-ray with evidence of pulmonary edema on my review awaiting official read  Most recent echo in January 2024 with LVEF 50 to 55%, grade 2 diastolic dysfunction  On PTA Toprol 50 mg daily, Bumex 2 mg twice daily and as needed metolazone  See plan above in flash pulmonary edema          Flash pulmonary edema (HCC)  Assessment & Plan  Progressively worsening shortness of breath throughout the day which became severe this evening  Initial  systolic with respiratory distress, chest x-ray with concern for pulmonary edema on my review awaiting official review, elevated BNP  Patient started on nitroglycerin drip and given IV Lasix 120 mg and initially placed on BiPAP although this was titrated off  Most recent echo in January 2024 with LVEF 50 to 55%, grade 2 diastolic dysfunction; on PTA Bumex 2 mg twice daily as well as as needed metolazone and metoprolol; denies missing any doses; history of monomorphic VT status post ICD in 2016  Troponin 52-->128   EKG AV paced  Admit to medicine on telemetry.  Troponin elevation thought secondary to demand ischemia as opposed to ACS in the setting of flash pulmonary edema.  Trend troponin to peak.  Start IV Lasix 80 mg twice daily and will continue nitro drip overnight.  Order 2D echo.  Consult CV team.  Continue PTA Xarelto as well is Toprol.  I's and O's.  Urinary retention protocol.  Daily weights.  Pacemaker device interrogation ordered.    Benign hypertension with CKD (chronic kidney disease) stage IV  (HCC)  Assessment & Plan  Lab Results   Component Value Date    EGFR 33 06/15/2024    EGFR 35 2024    EGFR 34 2024    CREATININE 1.66 (H) 06/15/2024    CREATININE 1.60 (H) 2024    CREATININE 1.62 (H) 2024     Creatinine at baseline  Daily metabolic panel    Atrial flutter (HCC)  Assessment & Plan  Hx Paroxysmal afib/flutter s/p ablation in  and    Continue pta xarelto and Toprol     Cocaine abuse (HCC)  Assessment & Plan  Noted history     History of monomorphic ventricular tachycardia, s/p ICD in 2016  Assessment & Plan  Noted history             VTE Pharmacologic Prophylaxis:   Pharmacologic: Rivaroxaban (Xarelto)  Mechanical VTE Prophylaxis in Place: No    Patient Centered Rounds: I have performed bedside rounds with nursing staff today.      Time Spent for Care: 1 hour.  More than 50% of total time spent on counseling and coordination of care as described above.    Current Length of Stay: 1 day(s)    Current Patient Status: Inpatient       Code Status: Level 1 - Full Code      Subjective:   nad    Objective:     Vitals:   Temp (24hrs), Av.9 °F (36.6 °C), Min:96.9 °F (36.1 °C), Max:98.9 °F (37.2 °C)    Temp:  [96.9 °F (36.1 °C)-98.9 °F (37.2 °C)] 98.9 °F (37.2 °C)  HR:  [59-86] 65  Resp:  [15-19] 18  BP: (102-154)/(57-88) 154/88  SpO2:  [89 %-95 %] 94 %  Body mass index is 35.6 kg/m².     Input and Output Summary (last 24 hours):       Intake/Output Summary (Last 24 hours) at 6/15/2024 0842  Last data filed at 6/15/2024 0605  Gross per 24 hour   Intake 710 ml   Output 2450 ml   Net -1740 ml       Physical Exam:     Physical Exam  HENT:      Head: Normocephalic and atraumatic.   Cardiovascular:      Rate and Rhythm: Normal rate and regular rhythm.   Pulmonary:      Effort: Pulmonary effort is normal.      Breath sounds: Normal breath sounds.   Abdominal:      General: Abdomen is flat.      Palpations: Abdomen is soft.   Neurological:      General: No focal deficit present.       Mental Status: She is alert and oriented to person, place, and time.   Psychiatric:         Mood and Affect: Mood normal.         Additional Data:     Labs:    Results from last 7 days   Lab Units 06/15/24  0503   WBC Thousand/uL 4.20*   HEMOGLOBIN g/dL 10.7*   HEMATOCRIT % 35.3   PLATELETS Thousands/uL 110*   SEGS PCT % 77*   LYMPHO PCT % 12*   MONO PCT % 8   EOS PCT % 2     Results from last 7 days   Lab Units 06/15/24  0503 06/14/24  0409 06/13/24  2327 06/13/24  2322   POTASSIUM mmol/L 3.7   < > 4.4  --    CHLORIDE mmol/L 102   < > 108  --    CO2 mmol/L 30   < > 21  --    CO2, I-STAT mmol/L  --   --   --  23   BUN mg/dL 20   < > 20  --    CREATININE mg/dL 1.66*   < > 1.62*  --    CALCIUM mg/dL 8.6   < > 8.5  --    ALK PHOS U/L  --   --  75  --    ALT U/L  --   --  7  --    AST U/L  --   --  15  --    GLUCOSE, ISTAT mg/dl  --   --   --  196*    < > = values in this interval not displayed.               Recent Cultures (last 7 days):           Last 24 Hours Medication List:   Current Facility-Administered Medications   Medication Dose Route Frequency Provider Last Rate    acetaminophen  650 mg Oral Q6H PRN Diana Jackson DO      amLODIPine  5 mg Oral Daily CHARLOTTE Santillan      bumetanide  3 mg Intravenous BID (diuretic) CHRALOTTE Burrell      butalbital-acetaminophen-caffeine  1 tablet Oral BID PRN Diana Jackson DO      ferrous gluconate  162 mg Oral Once per day on Monday Wednesday Friday Nacho Kamara DO      metoprolol succinate  50 mg Oral Daily Nacho Kamara DO      nicotine  1 patch Transdermal Daily Nacho Kamara DO      nitroGLYcerin  50 mcg/min Intravenous Titrated Winnie Vazquez DO Stopped (06/14/24 1123)    oxyCODONE  5 mg Oral Q4H PRN CHARLOTTE Jimenez      pantoprazole  40 mg Oral Early Morning Nacho Kamara DO      rivaroxaban  15 mg Oral QPM Nacho Kamara DO          Today, Patient Was Seen By: Diana Jackson DO    ** Please Note: Dictation voice to text software may have been used in the  creation of this document. **

## 2024-06-15 NOTE — PLAN OF CARE
Problem: PAIN - ADULT  Goal: Verbalizes/displays adequate comfort level or baseline comfort level  Description: Interventions:  - Encourage patient to monitor pain and request assistance  - Assess pain using appropriate pain scale  - Administer analgesics based on type and severity of pain and evaluate response  - Implement non-pharmacological measures as appropriate and evaluate response  - Consider cultural and social influences on pain and pain management  - Notify physician/advanced practitioner if interventions unsuccessful or patient reports new pain  6/15/2024 0958 by Zuleika Chamberlain  Outcome: Progressing  6/15/2024 0958 by Zuleika Chamberlain  Outcome: Progressing     Problem: INFECTION - ADULT  Goal: Absence or prevention of progression during hospitalization  Description: INTERVENTIONS:  - Assess and monitor for signs and symptoms of infection  - Monitor lab/diagnostic results  - Monitor all insertion sites, i.e. indwelling lines, tubes, and drains  - Monitor endotracheal if appropriate and nasal secretions for changes in amount and color  - Snyder appropriate cooling/warming therapies per order  - Administer medications as ordered  - Instruct and encourage patient and family to use good hand hygiene technique  - Identify and instruct in appropriate isolation precautions for identified infection/condition  6/15/2024 0958 by Zuleika Chamberlain  Outcome: Progressing  6/15/2024 0958 by Zuleika Chamberlain  Outcome: Progressing  Goal: Absence of fever/infection during neutropenic period  Description: INTERVENTIONS:  - Monitor WBC    6/15/2024 0958 by Zuleika Chamberlain  Outcome: Progressing  6/15/2024 0958 by Zuleika Chamberlain  Outcome: Progressing     Problem: SAFETY ADULT  Goal: Patient will remain free of falls  Description: INTERVENTIONS:  - Educate patient/family on patient safety including physical limitations  - Instruct patient to call for assistance with activity   - Consult OT/PT to assist with strengthening/mobility   - Keep  Call bell within reach  - Keep bed low and locked with side rails adjusted as appropriate  - Keep care items and personal belongings within reach  - Initiate and maintain comfort rounds  - Make Fall Risk Sign visible to staff  - Offer Toileting every  Hours, in advance of need  - Initiate/Maintain alarm  - Obtain necessary fall risk management equipment:   - Apply yellow socks and bracelet for high fall risk patients  - Consider moving patient to room near nurses station  6/15/2024 0958 by Zuleika Chamberlain  Outcome: Progressing  6/15/2024 0958 by Zuleika Chamberlain  Outcome: Progressing  Goal: Maintain or return to baseline ADL function  Description: INTERVENTIONS:  -  Assess patient's ability to carry out ADLs; assess patient's baseline for ADL function and identify physical deficits which impact ability to perform ADLs (bathing, care of mouth/teeth, toileting, grooming, dressing, etc.)  - Assess/evaluate cause of self-care deficits   - Assess range of motion  - Assess patient's mobility; develop plan if impaired  - Assess patient's need for assistive devices and provide as appropriate  - Encourage maximum independence but intervene and supervise when necessary  - Involve family in performance of ADLs  - Assess for home care needs following discharge   - Consider OT consult to assist with ADL evaluation and planning for discharge  - Provide patient education as appropriate  6/15/2024 0958 by Zuleika Chamberlain  Outcome: Progressing  6/15/2024 0958 by Zuleika Chamberlain  Outcome: Progressing  Goal: Maintains/Returns to pre admission functional level  Description: INTERVENTIONS:  - Perform AM-PAC 6 Click Basic Mobility/ Daily Activity assessment daily.  - Set and communicate daily mobility goal to care team and patient/family/caregiver.   - Collaborate with rehabilitation services on mobility goals if consulted  - Perform Range of Motion  times a day.  - Reposition patient every  hours.  - Dangle patient  times a day  - Stand patient   times a day  - Ambulate patient  times a day  - Out of bed to chair  times a day   - Out of bed for meals times a day  - Out of bed for toileting  - Record patient progress and toleration of activity level   6/15/2024 0958 by Zuleika Chamberlain  Outcome: Progressing  6/15/2024 0958 by Zuleika Chamberlain  Outcome: Progressing     Problem: DISCHARGE PLANNING  Goal: Discharge to home or other facility with appropriate resources  Description: INTERVENTIONS:  - Identify barriers to discharge w/patient and caregiver  - Arrange for needed discharge resources and transportation as appropriate  - Identify discharge learning needs (meds, wound care, etc.)  - Arrange for interpretive services to assist at discharge as needed  - Refer to Case Management Department for coordinating discharge planning if the patient needs post-hospital services based on physician/advanced practitioner order or complex needs related to functional status, cognitive ability, or social support system  6/15/2024 0958 by Zuleika Chamberlain  Outcome: Progressing  6/15/2024 0958 by Zuleika Chamberlain  Outcome: Progressing     Problem: Knowledge Deficit  Goal: Patient/family/caregiver demonstrates understanding of disease process, treatment plan, medications, and discharge instructions  Description: Complete learning assessment and assess knowledge base.  Interventions:  - Provide teaching at level of understanding  - Provide teaching via preferred learning methods  6/15/2024 0958 by Zuleika Chamberlain  Outcome: Progressing  6/15/2024 0958 by Zuleika Chamberlain  Outcome: Progressing     Problem: CARDIOVASCULAR - ADULT  Goal: Maintains optimal cardiac output and hemodynamic stability  Description: INTERVENTIONS:  - Monitor I/O, vital signs and rhythm  - Monitor for S/S and trends of decreased cardiac output  - Administer and titrate ordered vasoactive medications to optimize hemodynamic stability  - Assess quality of pulses, skin color and temperature  - Assess for signs of decreased  coronary artery perfusion  - Instruct patient to report change in severity of symptoms  Outcome: Progressing  Goal: Absence of cardiac dysrhythmias or at baseline rhythm  Description: INTERVENTIONS:  - Continuous cardiac monitoring, vital signs, obtain 12 lead EKG if ordered  - Administer antiarrhythmic and heart rate control medications as ordered  - Monitor electrolytes and administer replacement therapy as ordered  Outcome: Progressing     Problem: RESPIRATORY - ADULT  Goal: Achieves optimal ventilation and oxygenation  Description: INTERVENTIONS:  - Assess for changes in respiratory status  - Assess for changes in mentation and behavior  - Position to facilitate oxygenation and minimize respiratory effort  - Oxygen administered by appropriate delivery if ordered  - Initiate smoking cessation education as indicated  - Encourage broncho-pulmonary hygiene including cough, deep breathe, Incentive Spirometry  - Assess the need for suctioning and aspirate as needed  - Assess and instruct to report SOB or any respiratory difficulty  - Respiratory Therapy support as indicated  Outcome: Progressing

## 2024-06-15 NOTE — ASSESSMENT & PLAN NOTE
Lab Results   Component Value Date    EGFR 33 06/15/2024    EGFR 35 06/14/2024    EGFR 34 06/13/2024    CREATININE 1.66 (H) 06/15/2024    CREATININE 1.60 (H) 06/14/2024    CREATININE 1.62 (H) 06/13/2024     Creatinine at baseline  Daily metabolic panel

## 2024-06-16 LAB
ANION GAP SERPL CALCULATED.3IONS-SCNC: 9 MMOL/L (ref 4–13)
ATRIAL RATE: 340 BPM
ATRIAL RATE: 65 BPM
BUN SERPL-MCNC: 25 MG/DL (ref 5–25)
CALCIUM SERPL-MCNC: 8.9 MG/DL (ref 8.4–10.2)
CHLORIDE SERPL-SCNC: 100 MMOL/L (ref 96–108)
CO2 SERPL-SCNC: 28 MMOL/L (ref 21–32)
CREAT SERPL-MCNC: 1.75 MG/DL (ref 0.6–1.3)
GFR SERPL CREATININE-BSD FRML MDRD: 31 ML/MIN/1.73SQ M
GLUCOSE SERPL-MCNC: 86 MG/DL (ref 65–140)
GLUCOSE SERPL-MCNC: 98 MG/DL (ref 65–140)
POTASSIUM SERPL-SCNC: 3.8 MMOL/L (ref 3.5–5.3)
QRS AXIS: -68 DEGREES
QRS AXIS: -72 DEGREES
QRSD INTERVAL: 180 MS
QRSD INTERVAL: 188 MS
QT INTERVAL: 526 MS
QT INTERVAL: 536 MS
QTC INTERVAL: 526 MS
QTC INTERVAL: 539 MS
SODIUM SERPL-SCNC: 137 MMOL/L (ref 135–147)
T WAVE AXIS: 90 DEGREES
T WAVE AXIS: 95 DEGREES
VENTRICULAR RATE: 60 BPM
VENTRICULAR RATE: 61 BPM

## 2024-06-16 PROCEDURE — 93005 ELECTROCARDIOGRAM TRACING: CPT

## 2024-06-16 PROCEDURE — 99232 SBSQ HOSP IP/OBS MODERATE 35: CPT | Performed by: INTERNAL MEDICINE

## 2024-06-16 PROCEDURE — 93010 ELECTROCARDIOGRAM REPORT: CPT | Performed by: INTERNAL MEDICINE

## 2024-06-16 PROCEDURE — 80048 BASIC METABOLIC PNL TOTAL CA: CPT

## 2024-06-16 PROCEDURE — 82948 REAGENT STRIP/BLOOD GLUCOSE: CPT

## 2024-06-16 PROCEDURE — 99233 SBSQ HOSP IP/OBS HIGH 50: CPT | Performed by: INTERNAL MEDICINE

## 2024-06-16 RX ORDER — BUMETANIDE 2 MG/1
2 TABLET ORAL 2 TIMES DAILY
Status: DISCONTINUED | OUTPATIENT
Start: 2024-06-16 | End: 2024-06-17 | Stop reason: HOSPADM

## 2024-06-16 RX ADMIN — RIVAROXABAN 15 MG: 15 TABLET, FILM COATED ORAL at 17:19

## 2024-06-16 RX ADMIN — DIPHENHYDRAMINE HYDROCHLORIDE 25 MG: 50 INJECTION, SOLUTION INTRAMUSCULAR; INTRAVENOUS at 21:22

## 2024-06-16 RX ADMIN — BUMETANIDE 3 MG: 0.25 INJECTION INTRAMUSCULAR; INTRAVENOUS at 08:04

## 2024-06-16 RX ADMIN — OXYCODONE HYDROCHLORIDE 5 MG: 5 TABLET ORAL at 21:22

## 2024-06-16 RX ADMIN — AMLODIPINE BESYLATE 5 MG: 5 TABLET ORAL at 21:22

## 2024-06-16 RX ADMIN — OXYCODONE HYDROCHLORIDE 5 MG: 5 TABLET ORAL at 16:01

## 2024-06-16 RX ADMIN — METOPROLOL SUCCINATE 50 MG: 50 TABLET, EXTENDED RELEASE ORAL at 08:05

## 2024-06-16 RX ADMIN — METOCLOPRAMIDE HYDROCHLORIDE 10 MG: 5 INJECTION INTRAMUSCULAR; INTRAVENOUS at 13:00

## 2024-06-16 RX ADMIN — OXYCODONE HYDROCHLORIDE 5 MG: 5 TABLET ORAL at 05:46

## 2024-06-16 RX ADMIN — PANTOPRAZOLE SODIUM 40 MG: 40 TABLET, DELAYED RELEASE ORAL at 05:41

## 2024-06-16 RX ADMIN — AMLODIPINE BESYLATE 5 MG: 5 TABLET ORAL at 08:05

## 2024-06-16 RX ADMIN — METOCLOPRAMIDE HYDROCHLORIDE 10 MG: 5 INJECTION INTRAMUSCULAR; INTRAVENOUS at 21:22

## 2024-06-16 RX ADMIN — DIPHENHYDRAMINE HYDROCHLORIDE 25 MG: 50 INJECTION, SOLUTION INTRAMUSCULAR; INTRAVENOUS at 13:00

## 2024-06-16 RX ADMIN — BUMETANIDE 2 MG: 2 TABLET ORAL at 17:19

## 2024-06-16 RX ADMIN — NICOTINE 1 PATCH: 21 PATCH, EXTENDED RELEASE TRANSDERMAL at 08:05

## 2024-06-16 RX ADMIN — MAGNESIUM SULFATE HEPTAHYDRATE 2 G: 40 INJECTION, SOLUTION INTRAVENOUS at 11:44

## 2024-06-16 NOTE — PROGRESS NOTES
[facility]  Progress Note  Name: Lupe Menjivar I  MRN: [id number]  Unit/Bed#: [facility] 422-01 I Date of Admission: 6/13/2024   Date of Service: 6/16/2024 I Hospital Day: 2    Assessment & Plan   * Acute on chronic diastolic heart failure (HCC)  Assessment & Plan  Wt Readings from Last 3 Encounters:   06/16/24 102 kg (223 lb 15.8 oz)   04/28/24 99.3 kg (218 lb 14.7 oz)   04/01/24 99.6 kg (219 lb 9.6 oz)     Elevated BNP, chest x-ray with evidence of pulmonary edema on my review awaiting official read  Most recent echo in January 2024 with LVEF 50 to 55%, grade 2 diastolic dysfunction  On PTA Toprol 50 mg daily, Bumex 2 mg twice daily and as needed metolazone  See plan above in flash pulmonary edema          Flash pulmonary edema (HCC)  Assessment & Plan  Progressively worsening shortness of breath throughout the day which became severe this evening  Initial  systolic with respiratory distress, chest x-ray with concern for pulmonary edema on my review awaiting official review, elevated BNP  Patient started on nitroglycerin drip and given IV Lasix 120 mg and initially placed on BiPAP although this was titrated off.  Patient off nitro now.  Oral Bumex initiated  Troponin 52-->128   EKG AV paced  Note cardiology input.  Patient off nitro.  Pacemaker device interrogation ordered.    Benign hypertension with CKD (chronic kidney disease) stage IV (HCC)  Assessment & Plan  Lab Results   Component Value Date    EGFR 33 06/15/2024    EGFR 35 06/14/2024    EGFR 34 06/13/2024    CREATININE 1.66 (H) 06/15/2024    CREATININE 1.60 (H) 06/14/2024    CREATININE 1.62 (H) 06/13/2024     Creatinine at baseline  Daily metabolic panel    Atrial flutter (HCC)  Assessment & Plan  Hx Paroxysmal afib/flutter s/p ablation in 2020 and 2022   Continue pta xarelto and Toprol     History of monomorphic ventricular tachycardia, s/p ICD in 2016  Assessment & Plan  Noted history               Mechanical VTE  Prophylaxis in Place: No    Patient Centered Rounds: I have performed bedside rounds with nursing staff today.      Time Spent for Care: 1 hour.  More than 50% of total time spent on counseling and coordination of care as described above.    Current Length of Stay: 2 day(s)    Current Patient Status: Inpatient   Certification Statement:     Code Status: Level 1 - Full Code      Subjective:   nad    Objective:     Vitals:   Temp (24hrs), Av.1 °F (36.7 °C), Min:97.8 °F (36.6 °C), Max:98.6 °F (37 °C)    Temp:  [97.8 °F (36.6 °C)-98.6 °F (37 °C)] 98.2 °F (36.8 °C)  HR:  [60-66] 66  Resp:  [20] 20  BP: (136-160)/() 136/77  SpO2:  [91 %-96 %] 91 %  Body mass index is 35.08 kg/m².     Input and Output Summary (last 24 hours):       Intake/Output Summary (Last 24 hours) at 2024 1338  Last data filed at 2024 1019  Gross per 24 hour   Intake 870 ml   Output 2450 ml   Net -1580 ml       Physical Exam:     Physical Exam  Constitutional:       Appearance: Normal appearance.   HENT:      Head: Normocephalic and atraumatic.   Cardiovascular:      Rate and Rhythm: Normal rate and regular rhythm.      Pulses: Normal pulses.      Heart sounds: Normal heart sounds. No murmur heard.  Pulmonary:      Effort: Pulmonary effort is normal.   Skin:     General: Skin is warm and dry.   Neurological:      General: No focal deficit present.      Mental Status: She is alert.   Psychiatric:         Mood and Affect: Mood normal.         Additional Data:     Labs:    Results from last 7 days   Lab Units 06/15/24  0503   WBC Thousand/uL 4.20*   HEMOGLOBIN g/dL 10.7*   HEMATOCRIT % 35.3   PLATELETS Thousands/uL 110*   SEGS PCT % 77*   LYMPHO PCT % 12*   MONO PCT % 8   EOS PCT % 2     Results from last 7 days   Lab Units 06/15/24  0503 24  0409 24  2327 24  2322   POTASSIUM mmol/L 3.7   < > 4.4  --    CHLORIDE mmol/L 102   < > 108  --    CO2 mmol/L 30   < > 21  --    CO2, I-STAT mmol/L  --   --   --  23   BUN  mg/dL 20   < > 20  --    CREATININE mg/dL 1.66*   < > 1.62*  --    CALCIUM mg/dL 8.6   < > 8.5  --    ALK PHOS U/L  --   --  75  --    ALT U/L  --   --  7  --    AST U/L  --   --  15  --    GLUCOSE, ISTAT mg/dl  --   --   --  196*    < > = values in this interval not displayed.           * I Have Reviewed All Lab Data Listed Above.  * Additional Pertinent Lab Tests Reviewed: All Labs Within Last 24 Hours Reviewed      Recent Cultures (last 7 days):           Last 24 Hours Medication List:   Current Facility-Administered Medications   Medication Dose Route Frequency Provider Last Rate    acetaminophen  650 mg Oral Q6H PRN Diana Jackson, DO      amLODIPine  5 mg Oral BID Fredis Diamond MD      bumetanide  2 mg Oral BID CHARLOTTE Carrasco      butalbital-acetaminophen-caffeine  1 tablet Oral BID PRN Jeremyul Jackson, DO      diphenhydrAMINE  25 mg Intravenous Q8H [facility] Diana Mendesa, DO      ferrous gluconate  162 mg Oral Once per day on Monday Wednesday Friday Nacho Kamara DO      hydrALAZINE  5 mg Intravenous Q6H PRN Diana Jackson, DO      magnesium sulfate  2 g Intravenous Q24H Diana Jackson, DO 2 g (06/16/24 1144)    metoclopramide  10 mg Intravenous Q8H [facility] Diana Mendesa, DO      metoprolol succinate  50 mg Oral Daily Nacho Kamara DO      nicotine  1 patch Transdermal Daily Nacho Kamara DO      nitroGLYcerin  50 mcg/min Intravenous Titrated Winnie Vazquez DO Stopped (06/14/24 1123)    oxyCODONE  5 mg Oral Q4H PRN CHARLOTTE Jimenez      pantoprazole  40 mg Oral Early Morning Nacho Kamara DO      rivaroxaban  15 mg Oral QPM Nacho Kamara DO      SUMAtriptan  6 mg Subcutaneous Q1H PRN Diana Jackson,           Today, Patient Was Seen By: Diana Jackson DO    ** Please Note: Dictation voice to text software may have been used in the creation of this document. **

## 2024-06-16 NOTE — ASSESSMENT & PLAN NOTE
Wt Readings from Last 3 Encounters:   06/16/24 102 kg (223 lb 15.8 oz)   04/28/24 99.3 kg (218 lb 14.7 oz)   04/01/24 99.6 kg (219 lb 9.6 oz)     Elevated BNP, chest x-ray with evidence of pulmonary edema on my review awaiting official read  Most recent echo in January 2024 with LVEF 50 to 55%, grade 2 diastolic dysfunction  On PTA Toprol 50 mg daily, Bumex 2 mg twice daily and as needed metolazone  See plan above in flash pulmonary edema

## 2024-06-16 NOTE — ASSESSMENT & PLAN NOTE
Progressively worsening shortness of breath throughout the day which became severe this evening  Initial  systolic with respiratory distress, chest x-ray with concern for pulmonary edema on my review awaiting official review, elevated BNP  Patient started on nitroglycerin drip and given IV Lasix 120 mg and initially placed on BiPAP although this was titrated off.  Patient off nitro now.  Oral Bumex initiated  Troponin 52-->128   EKG AV paced  Note cardiology input.  Patient off nitro.  Pacemaker device interrogation ordered.

## 2024-06-16 NOTE — PROGRESS NOTES
General Cardiology   Progress Note -  [facility]   Lupe Menjivar 59 y.o. female MRN: [id number]    Unit/Bed#: [facility] 422-01 Encounter: [id number]    Assessment:  Acute on chronic diastolic heart failure  -Chest x-ray (6/13/2024) pulmonary edema.  BNP on admission 926 (previously 761 in March 2024)  -Currently diuresing with Bumex 3 mg IV twice daily  -Admission weight 105 kg (weight today 102 kg)  -Dry weight 98 kg (216 lbs)   -Echocardiogram (6/14/2024): Left ventricular ejection fraction 60%.  Wall motion is normal.  Grade 2 diastolic dysfunction.  No significant valvular disease.  Left atrial dilation 35 to 41 mL/M2)  -Nitroglycerin drip titrated off 6/14/2024  -Euvolemic on examination     Hypertensive emergency  -Blood pressure on admission 265/128  -Nitroglycerin drip titrated off 6/14/2024  -Amlodipine 5 mg twice daily   -Metoprolol succinate 50 mg daily  -     Chest pain/elevated troponin  -Elevated troponins -176 in the setting of hypertensive emergency and acute congestive heart failure  -EKG at baseline normal sinus rhythm with AV pacing  -[facility] (2019): No significant epicardial coronary artery disease     History of atrial fibrillation/a flutter  -History of permanent atrial fibrillation  -Status post AVJ ablation  -[id number] score 4  -Anticoagulated with Xarelto 15 mg daily  -Telemetry AV paced on monitor no ectopy     HCM  -Echocardiogram showing moderate to severe asymmetric hypertrophy.   Mild atrial dilation (35 to 41 mL/m2)   -Amlodipine 5 mg twice daily and Metoprolol 50 mg daily      History of VT  -Status post dual-chamber ICD implant 2016  -Metoprolol 50 mg daily     CKD stage IIIb  -Baseline creatinine 1.5-1.6  -Continue to monitor renal function and electrolytes       Tobacco abuse  -NicoDerm CQ 21 mg / 24 hours  -Encouraged smoking cessation       Plan/Recommendations:  -BMP today  -Bumex 2 mg po twice daily   -Continue amlodipine 5 mg twice  "daily            ______________________________________________________________________________________    Subjective  Patient seen and examined in bed.  She offers no complaints of chest pain, shortness of breath, lower extremity edema, orthopnea or PND.  Vital signs stable.  Hypertension is improving.  AV and V paced on monitor, occasional PVCs.      Review of Systems   Constitutional: Negative. Negative for chills and fever.   HENT: Negative.     Eyes: Negative.    Cardiovascular: Negative.  Negative for chest pain, dyspnea on exertion, irregular heartbeat and leg swelling.   Respiratory: Negative.  Negative for cough, shortness of breath and wheezing.    Endocrine: Negative.    Skin: Negative.    Musculoskeletal: Negative.    Gastrointestinal: Negative.  Negative for abdominal pain.   Genitourinary: Negative.    Neurological: Negative.    Psychiatric/Behavioral: Negative.         Objective:   Vitals: Blood pressure (!) 160/103, pulse 61, temperature 97.8 °F (36.6 °C), temperature source Oral, resp. rate 20, height 5' 7\" (1.702 m), weight 102 kg (223 lb 15.8 oz), SpO2 94%, not currently breastfeeding.,     Wt Readings from Last 3 Encounters:   24 102 kg (223 lb 15.8 oz)   24 99.3 kg (218 lb 14.7 oz)   24 99.6 kg (219 lb 9.6 oz)        Lab Results   Component Value Date    CREATININE 1.66 (H) 06/15/2024    CREATININE 1.60 (H) 2024    CREATININE 1.62 (H) 2024         Body mass index is 35.08 kg/m².,     Systolic (24hrs), Av , Min:136 , Max:171     Diastolic (24hrs), Av, Min:81, Max:103          Intake/Output Summary (Last 24 hours) at 2024 1100  Last data filed at 2024 1019  Gross per 24 hour   Intake 1310 ml   Output 2450 ml   Net -1140 ml     Weight (last 2 days)       Date/Time Weight    24 0548 102 (223.99)    06/15/24 0600 103 (227.29)    24 1116 104 (230)    24 05:24:55 105 (230.82)              Telemetry Review: No significant arrhythmias " seen on telemetry review.  AV paced on monitor occasional PVCs.      Physical Exam  Constitutional:       General: She is not in acute distress.     Appearance: Normal appearance. She is obese. She is not ill-appearing.   HENT:      Head: Normocephalic.      Nose: Nose normal.      Mouth/Throat:      Pharynx: Oropharynx is clear.   Eyes:      Conjunctiva/sclera: Conjunctivae normal.   Cardiovascular:      Rate and Rhythm: Normal rate and regular rhythm.      Pulses: Normal pulses.      Heart sounds: Normal heart sounds. No murmur heard.     No friction rub. No gallop.   Pulmonary:      Breath sounds: Normal breath sounds. No wheezing, rhonchi or rales.   Abdominal:      General: Bowel sounds are normal. There is no distension.      Palpations: Abdomen is soft.   Musculoskeletal:      Cervical back: Neck supple.      Right lower leg: No edema.      Left lower leg: No edema.   Neurological:      Mental Status: She is alert and oriented to person, place, and time.   Psychiatric:         Mood and Affect: Mood normal.         Behavior: Behavior normal.         Thought Content: Thought content normal.         LABORATORY RESULTS      CBC with diff:   Results from last 7 days   Lab Units 06/15/24  0503 06/13/24  2327 06/13/24  2322   WBC Thousand/uL 4.20* 9.12  --    HEMOGLOBIN g/dL 10.7* 10.8*  --    I STAT HEMOGLOBIN g/dl  --   --  11.6   HEMATOCRIT % 35.3 36.0  --    HEMATOCRIT, ISTAT %  --   --  34*   MCV fL 84 88  --    PLATELETS Thousands/uL 110* 168  --    RBC Million/uL 4.20 4.11  --    MCH pg 25.5* 26.3*  --    MCHC g/dL 30.3* 30.0*  --    RDW % 15.7* 15.9*  --    MPV fL 11.8 11.5  --    NRBC AUTO /100 WBCs 0 0  --        CMP:  Results from last 7 days   Lab Units 06/15/24  0503 06/14/24  0409 06/13/24 2327 06/13/24 2322   POTASSIUM mmol/L 3.7 4.9 4.4  --    CHLORIDE mmol/L 102 106 108  --    CO2 mmol/L 30 27 21  --    CO2, I-STAT mmol/L  --   --   --  23   BUN mg/dL 20 21 20  --    CREATININE mg/dL 1.66* 1.60*  "1.62*  --    GLUCOSE, ISTAT mg/dl  --   --   --  196*   CALCIUM mg/dL 8.6 8.2* 8.5  --    AST U/L  --   --  15  --    ALT U/L  --   --  7  --    ALK PHOS U/L  --   --  75  --    EGFR ml/min/1.73sq m 33 35 34  --        BMP:  Results from last 7 days   Lab Units 06/15/24  0503 06/14/24  0409 06/13/24  2327 06/13/24  2322   POTASSIUM mmol/L 3.7 4.9 4.4  --    CHLORIDE mmol/L 102 106 108  --    CO2 mmol/L 30 27 21  --    CO2, I-STAT mmol/L  --   --   --  23   BUN mg/dL 20 21 20  --    CREATININE mg/dL 1.66* 1.60* 1.62*  --    GLUCOSE, ISTAT mg/dl  --   --   --  196*   CALCIUM mg/dL 8.6 8.2* 8.5  --        Lab Results   Component Value Date    NTBNP 9,053 (H) 01/13/2023    NTBNP 5,423 (H) 01/04/2023    NTBNP 16,909 (H) 04/07/2022             Results from last 7 days   Lab Units 06/14/24  0409   MAGNESIUM mg/dL 2.1                           Lipid Profile:   No results found for: \"CHOL\"  Lab Results   Component Value Date    HDL 40 (L) 04/05/2023    HDL 42 (L) 02/07/2021    HDL 43 09/17/2020     Lab Results   Component Value Date    LDLCALC 68 04/05/2023    LDLCALC 57 02/07/2021    LDLCALC 72 09/17/2020     Lab Results   Component Value Date    TRIG 83 04/05/2023    TRIG 80.9 02/07/2021    TRIG 89 09/17/2020         Meds/Allergies   all current active meds have been reviewed    Medications Prior to Admission:     bumetanide (BUMEX) 2 mg tablet    ferrous gluconate (FERGON) 240 (27 FE) MG tablet    metolazone (ZAROXOLYN) 5 mg tablet    metoprolol succinate (TOPROL-XL) 50 mg 24 hr tablet    nystatin (MYCOSTATIN) powder    ondansetron (Zofran ODT) 4 mg disintegrating tablet    oxyCODONE (ROXICODONE) 5 immediate release tablet    pantoprazole (PROTONIX) 40 mg tablet    polyethylene glycol (MIRALAX) 17 g packet    polymyxin b-trimethoprim (POLYTRIM) ophthalmic solution    potassium chloride (Klor-Con M20) 20 mEq tablet    rivaroxaban (XARELTO) 15 mg tablet    nitroGLYcerin, 50 mcg/min, Last Rate: Stopped (06/14/24 " 1123)        Counseling / Coordination of Care  Total floor / unit time spent today 20 minutes.  Greater than 50% of total time was spent with the patient and / or family counseling and / or coordination of care.      ** Please Note: Dragon 360 Dictation voice to text software may have been used in the creation of this document. **

## 2024-06-17 VITALS
WEIGHT: 224.21 LBS | HEIGHT: 67 IN | TEMPERATURE: 98 F | DIASTOLIC BLOOD PRESSURE: 83 MMHG | SYSTOLIC BLOOD PRESSURE: 139 MMHG | HEART RATE: 61 BPM | RESPIRATION RATE: 18 BRPM | OXYGEN SATURATION: 96 % | BODY MASS INDEX: 35.19 KG/M2

## 2024-06-17 LAB
ANION GAP SERPL CALCULATED.3IONS-SCNC: 11 MMOL/L (ref 4–13)
ATRIAL RATE: 60 BPM
BUN SERPL-MCNC: 29 MG/DL (ref 5–25)
CALCIUM SERPL-MCNC: 8.8 MG/DL (ref 8.4–10.2)
CHLORIDE SERPL-SCNC: 99 MMOL/L (ref 96–108)
CO2 SERPL-SCNC: 27 MMOL/L (ref 21–32)
CREAT SERPL-MCNC: 1.72 MG/DL (ref 0.6–1.3)
GFR SERPL CREATININE-BSD FRML MDRD: 32 ML/MIN/1.73SQ M
GLUCOSE SERPL-MCNC: 100 MG/DL (ref 65–140)
POTASSIUM SERPL-SCNC: 3.6 MMOL/L (ref 3.5–5.3)
QRS AXIS: -73 DEGREES
QRSD INTERVAL: 188 MS
QT INTERVAL: 532 MS
QTC INTERVAL: 532 MS
SODIUM SERPL-SCNC: 137 MMOL/L (ref 135–147)
T WAVE AXIS: 96 DEGREES
VENTRICULAR RATE: 60 BPM

## 2024-06-17 PROCEDURE — 93010 ELECTROCARDIOGRAM REPORT: CPT | Performed by: INTERNAL MEDICINE

## 2024-06-17 PROCEDURE — 99239 HOSP IP/OBS DSCHRG MGMT >30: CPT | Performed by: INTERNAL MEDICINE

## 2024-06-17 PROCEDURE — 99232 SBSQ HOSP IP/OBS MODERATE 35: CPT | Performed by: INTERNAL MEDICINE

## 2024-06-17 PROCEDURE — 93005 ELECTROCARDIOGRAM TRACING: CPT

## 2024-06-17 PROCEDURE — 80048 BASIC METABOLIC PNL TOTAL CA: CPT

## 2024-06-17 RX ORDER — AMLODIPINE BESYLATE 5 MG/1
5 TABLET ORAL 2 TIMES DAILY
Qty: 60 TABLET | Refills: 0 | Status: SHIPPED | OUTPATIENT
Start: 2024-06-17 | End: 2024-06-19 | Stop reason: SDUPTHER

## 2024-06-17 RX ADMIN — BUMETANIDE 2 MG: 2 TABLET ORAL at 09:52

## 2024-06-17 RX ADMIN — NICOTINE 1 PATCH: 21 PATCH, EXTENDED RELEASE TRANSDERMAL at 09:56

## 2024-06-17 RX ADMIN — DIPHENHYDRAMINE HYDROCHLORIDE 25 MG: 50 INJECTION, SOLUTION INTRAMUSCULAR; INTRAVENOUS at 06:43

## 2024-06-17 RX ADMIN — FERROUS GLUCONATE 162 MG: 324 TABLET ORAL at 09:55

## 2024-06-17 RX ADMIN — OXYCODONE HYDROCHLORIDE 5 MG: 5 TABLET ORAL at 09:51

## 2024-06-17 RX ADMIN — METOPROLOL SUCCINATE 50 MG: 50 TABLET, EXTENDED RELEASE ORAL at 09:52

## 2024-06-17 RX ADMIN — OXYCODONE HYDROCHLORIDE 5 MG: 5 TABLET ORAL at 14:46

## 2024-06-17 RX ADMIN — AMLODIPINE BESYLATE 5 MG: 5 TABLET ORAL at 09:53

## 2024-06-17 RX ADMIN — PANTOPRAZOLE SODIUM 40 MG: 40 TABLET, DELAYED RELEASE ORAL at 06:45

## 2024-06-17 NOTE — RESPIRATORY THERAPY NOTE
Home Oxygen Qualifying Test     Patient name: Lupe Menjivar        : 1965   Date of Test:  2024  Diagnosis:    Home Oxygen Test:    **Medicare Guidelines require item(s) 1-5 on all ambulatory patients or 1 and 2 on non-ambulatory patients.    1. Baseline SPO2 on Room Air at rest 95 %   If <= 88% on Room Air add O2 via NC to obtain SpO2 >=88%. If LPM needed, document 0 LPM  needed to reach =>88%    SPO2 during exertion on Room Air 95  %  During exertion monitor SPO2. If SPO2 increases >=89%, do not add supplemental oxygen    SPO2 on Oxygen at Rest 98 % at  0 LPM    SPO2 during exertion on Oxygen 95 % at 0 LPM    Test performed during exertion activity.      []  Supplemental Home Oxygen is indicated.    [x]  Client does not qualify for home oxygen.    Respiratory Additional Notes- pt was 95-98% on ra during ambulation with nurse, pt does not qualify for supplemental home o2    Nellie Rebollar, RT

## 2024-06-17 NOTE — PROGRESS NOTES
General Cardiology   Progress Note -  [employer]   Lupe Menjivar 59 y.o. female MRN: [id number]    Unit/Bed#: [facility] 422-01 Encounter: [id number]    Assessment and Plan:    Flash pulmonary edema   -CXR 6/13/2024 - Appearance most compatible with pulmonary edema, infection not excluded.   -Reports improvement of shortness of breath post diuresis  Continue PO Bumex 2 mg BID    Acute on chronic diastolic heart failure  -Chest x-ray (6/13/2024) pulmonary edema.    - BNP on admission 926 (previously 761 in March 2024)  PTA meds: Bumex 2 mg BID, PRN metolazone 5mg daily, metoprolol 50 mg daily.  -In patient: metoprolol 50 mg daily, Bumex 2 mg PO BID  - 24 Hr I/O balance -1055 mL  -Admission weight 105 kg (weight today 102 kg)  -Dry weight 98 kg (216 lbs)   -Echocardiogram (6/14/2024): Left ventricular ejection fraction 60%.  Wall motion is normal.  Grade 2 diastolic dysfunction.  No significant valvular disease.  Left atrial dilation 35 to 41 mL/M2)  -Nitroglycerin drip titrated off 6/14/2024  -Euvolemic on examination  - Continue PTA meds  -Follow up with cardiology outpatient on 6/19/24     Hypertensive emergency  -Blood pressure on admission 265/128  -Nitroglycerin drip titrated off 6/14/2024  -BP average today 133/85  - PTA meds: metoprolol 50 mg daily  - Inpatient regimen: amlodipine 5 mg BID, Metoprolol 50 mg daily     Chest pain/elevated troponin  -Elevated troponins -176 in the setting of hypertensive emergency and acute congestive heart failure  -EKG at baseline normal sinus rhythm with AV pacing  - EKG 6/17/2024 Ventricular paced rhythm  -[facility] (2019): No significant epicardial coronary artery disease  -ECHO as noted above  - Reports no chest pain today     Paroxysmal atrial fibrillation/a flutter  -History of permanent atrial fibrillation. Dual chamber ICD implant in 2016.   -Status post AVJ ablation  -[id number] score 4  - Anticoagulated with Xarelto 15 mg daily  -Telemetry review: V paced with underlying  "Afib/Aflutter. 9 beats Non sustained VT noted overnight.  HR 58-60     HCM  -Echocardiogram showing moderate to severe asymmetric hypertrophy.   Mild atrial dilation (35 to 41 mL/m2)   -Continue Amlodipine 5 mg twice daily and Metoprolol 50 mg daily      History of VT  -Status post dual-chamber ICD implant 2016  -Metoprolol 50 mg daily     CKD stage IIIb  -Baseline creatinine 1.5-1.6  - creatinine today 1.72  -Continue to monitor renal function and electrolytes      Tobacco abuse  -NicoDerm CQ 21 mg / 24 hours  -Encouraged smoking cessation      ______________________________________________________________________________________    Subjective  On examination patient is up in bed. She reports no shortness of breath. Denies any current chest pain, N/V/D, palpitations, lightheadedness, dizziness.      Review of Systems   Constitutional: Negative for chills, diaphoresis, fever, malaise/fatigue and weight gain.   Cardiovascular:  Negative for chest pain, dyspnea on exertion, leg swelling, orthopnea, palpitations and syncope.   Respiratory:  Negative for cough and shortness of breath.    Gastrointestinal:  Negative for abdominal pain, constipation, nausea and vomiting.   Neurological:  Negative for dizziness, headaches and light-headedness.   Psychiatric/Behavioral:  Negative for altered mental status.        Objective:   Vitals: Blood pressure 117/73, pulse 60, temperature 98 °F (36.7 °C), temperature source Oral, resp. rate 18, height 5' 7\" (1.702 m), weight 102 kg (224 lb 3.3 oz), SpO2 95%, not currently breastfeeding.,     Body mass index is 35.12 kg/m².,     Systolic (24hrs), Av , Min:117 , Max:148     Diastolic (24hrs), Av, Min:73, Max:99      Lab Results   Component Value Date    CREATININE 1.72 (H) 2024    CREATININE 1.75 (H) 2024    CREATININE 1.66 (H) 06/15/2024         Intake/Output Summary (Last 24 hours) at 2024 1317  Last data filed at 2024 1200  Gross per 24 hour   Intake " 620 ml   Output 1425 ml   Net -805 ml     Wt Readings from Last 3 Encounters:   06/17/24 102 kg (224 lb 3.3 oz)   04/28/24 99.3 kg (218 lb 14.7 oz)   04/01/24 99.6 kg (219 lb 9.6 oz)      Weight (last 2 days)       Date/Time Weight    06/17/24 0600 102 (224.21)    06/16/24 0548 102 (223.99)    06/15/24 0600 103 (227.29)            Invasive Devices       Peripheral Intravenous Line  Duration             Peripheral IV 06/14/24 Dorsal (posterior);Right Forearm 3 days                    Telemetry Review: No significant arrhythmias seen on telemetry review.       Physical Exam  Vitals and nursing note reviewed.   Constitutional:       General: She is not in acute distress.     Appearance: Normal appearance. She is not ill-appearing or diaphoretic.   HENT:      Head: Normocephalic and atraumatic.   Cardiovascular:      Rate and Rhythm: Normal rate. Rhythm irregular.      Pulses: Normal pulses.      Heart sounds: Normal heart sounds. No murmur heard.  Pulmonary:      Effort: Pulmonary effort is normal. No respiratory distress.      Breath sounds: Normal breath sounds.   Abdominal:      General: Bowel sounds are normal. There is no distension.      Palpations: Abdomen is soft.      Tenderness: There is no abdominal tenderness.   Musculoskeletal:      Cervical back: Neck supple.      Right lower leg: No edema.      Left lower leg: No edema.   Skin:     General: Skin is warm and dry.      Capillary Refill: Capillary refill takes less than 2 seconds.   Neurological:      Mental Status: She is alert and oriented to person, place, and time.   Psychiatric:         Mood and Affect: Mood normal.         Behavior: Behavior normal.       LABORATORY RESULTS      CBC with diff:   Results from last 7 days   Lab Units 06/15/24  0503 06/13/24  2327 06/13/24  2322   WBC Thousand/uL 4.20* 9.12  --    HEMOGLOBIN g/dL 10.7* 10.8*  --    I STAT HEMOGLOBIN g/dl  --   --  11.6   HEMATOCRIT % 35.3 36.0  --    HEMATOCRIT, ISTAT %  --   --  34*  "  MCV fL 84 88  --    PLATELETS Thousands/uL 110* 168  --    RBC Million/uL 4.20 4.11  --    MCH pg 25.5* 26.3*  --    MCHC g/dL 30.3* 30.0*  --    RDW % 15.7* 15.9*  --    MPV fL 11.8 11.5  --    NRBC AUTO /100 WBCs 0 0  --        CMP:  Results from last 7 days   Lab Units 06/17/24  0548 06/16/24  1326 06/15/24  0503 06/14/24 0409 06/13/24 2327 06/13/24 2322   POTASSIUM mmol/L 3.6 3.8 3.7 4.9 4.4  --    CHLORIDE mmol/L 99 100 102 106 108  --    CO2 mmol/L 27 28 30 27 21  --    CO2, I-STAT mmol/L  --   --   --   --   --  23   BUN mg/dL 29* 25 20 21 20  --    CREATININE mg/dL 1.72* 1.75* 1.66* 1.60* 1.62*  --    GLUCOSE, ISTAT mg/dl  --   --   --   --   --  196*   CALCIUM mg/dL 8.8 8.9 8.6 8.2* 8.5  --    AST U/L  --   --   --   --  15  --    ALT U/L  --   --   --   --  7  --    ALK PHOS U/L  --   --   --   --  75  --    EGFR ml/min/1.73sq m 32 31 33 35 34  --        BMP:  Results from last 7 days   Lab Units 06/17/24  0548 06/16/24  1326 06/15/24  0503 06/14/24 0409 06/13/24 2327 06/13/24 2322   POTASSIUM mmol/L 3.6 3.8 3.7 4.9 4.4  --    CHLORIDE mmol/L 99 100 102 106 108  --    CO2 mmol/L 27 28 30 27 21  --    CO2, I-STAT mmol/L  --   --   --   --   --  23   BUN mg/dL 29* 25 20 21 20  --    CREATININE mg/dL 1.72* 1.75* 1.66* 1.60* 1.62*  --    GLUCOSE, ISTAT mg/dl  --   --   --   --   --  196*   CALCIUM mg/dL 8.8 8.9 8.6 8.2* 8.5  --        Lab Results   Component Value Date    NTBNP 9,053 (H) 01/13/2023    NTBNP 5,423 (H) 01/04/2023    NTBNP 16,909 (H) 04/07/2022          Results from last 7 days   Lab Units 06/14/24  0409   MAGNESIUM mg/dL 2.1       Lipid Profile:   No results found for: \"CHOL\"  Lab Results   Component Value Date    HDL 40 (L) 04/05/2023    HDL 42 (L) 02/07/2021    HDL 43 09/17/2020     Lab Results   Component Value Date    LDLCALC 68 04/05/2023    LDLCALC 57 02/07/2021    LDLCALC 72 09/17/2020     Lab Results   Component Value Date    TRIG 83 04/05/2023    TRIG 80.9 02/07/2021    TRIG 89 " 09/17/2020         Meds/Allergies   all current active meds have been reviewed, current meds:   Current Facility-Administered Medications   Medication Dose Route Frequency    acetaminophen (TYLENOL) tablet 650 mg  650 mg Oral Q6H PRN    amLODIPine (NORVASC) tablet 5 mg  5 mg Oral BID    bumetanide (BUMEX) tablet 2 mg  2 mg Oral BID    butalbital-acetaminophen-caffeine (FIORICET,ESGIC) -40 mg per tablet 1 tablet  1 tablet Oral BID PRN    diphenhydrAMINE (BENADRYL) injection 25 mg  25 mg Intravenous Q8H JEISON    ferrous gluconate (FERGON) tablet 162 mg  162 mg Oral Once per day on Monday Wednesday Friday    hydrALAZINE (APRESOLINE) injection 5 mg  5 mg Intravenous Q6H PRN    magnesium sulfate 2 g/50 mL IVPB (premix) 2 g  2 g Intravenous Q24H    metoclopramide (REGLAN) injection 10 mg  10 mg Intravenous Q8H JEISON    metoprolol succinate (TOPROL-XL) 24 hr tablet 50 mg  50 mg Oral Daily    nicotine (NICODERM CQ) 21 mg/24 hr TD 24 hr patch 1 patch  1 patch Transdermal Daily    nitroGLYcerin (TRIDIL) 50 mg in 250 mL infusion (premix)  50 mcg/min Intravenous Titrated    oxyCODONE (ROXICODONE) IR tablet 5 mg  5 mg Oral Q4H PRN    pantoprazole (PROTONIX) EC tablet 40 mg  40 mg Oral Early Morning    rivaroxaban (XARELTO) tablet 15 mg  15 mg Oral QPM    SUMAtriptan (IMITREX) subcutaneous injection 6 mg  6 mg Subcutaneous Q1H PRN   , and PTA meds:   Prior to Admission Medications   Prescriptions Last Dose Informant Patient Reported? Taking?   bumetanide (BUMEX) 2 mg tablet   No No   Sig: Take 1 tablet (2 mg total) by mouth 2 (two) times a day   ferrous gluconate (FERGON) 240 (27 FE) MG tablet  Self No No   Sig: Take 0.5 tablets (120 mg total) by mouth 3 (three) times a week   Patient taking differently: Take 120 mg by mouth 3 (three) times a week MWF   metolazone (ZAROXOLYN) 5 mg tablet Not Taking Self No No   Sig: Take 1 tablet (5 mg total) by mouth if needed (as needed for weight gain >3 lbs in 1 day or >5 lbs in 2-3 days)    Patient not taking: Reported on 6/14/2024   metoprolol succinate (TOPROL-XL) 50 mg 24 hr tablet   No No   Sig: TAKE 1 TABLET(50 MG) BY MOUTH DAILY   nystatin (MYCOSTATIN) powder  Self No No   Sig: Apply topically in the morning   ondansetron (Zofran ODT) 4 mg disintegrating tablet  Self No No   Sig: Take 1 tablet (4 mg total) by mouth every 6 (six) hours as needed for nausea or vomiting   oxyCODONE (ROXICODONE) 5 immediate release tablet  Self Yes No   Sig: Take 5 mg by mouth every 6 (six) hours as needed   pantoprazole (PROTONIX) 40 mg tablet   No No   Sig: TAKE 1 TABLET(40 MG) BY MOUTH DAILY   polyethylene glycol (MIRALAX) 17 g packet   No No   Sig: Take 17 g by mouth daily   polymyxin b-trimethoprim (POLYTRIM) ophthalmic solution Not Taking  No No   Sig: Administer 1 drop to the right eye every 4 (four) hours   Patient not taking: Reported on 6/14/2024   potassium chloride (Klor-Con M20) 20 mEq tablet   No No   Sig: Take 1 tablet (20 mEq total) by mouth daily   rivaroxaban (XARELTO) 15 mg tablet  Self No No   Sig: Take 1 tablet (15 mg total) by mouth every evening      Facility-Administered Medications: None           Counseling / Coordination of Care  Total floor / unit time spent today 20 minutes.  Greater than 50% of total time was spent with the patient and / or family counseling and / or coordination of care.      ** Please Note: Dragon 360 Dictation voice to text software may have been used in the creation of this document. **

## 2024-06-17 NOTE — DISCHARGE SUMMARY
[facility]  Discharge- Lupe Menjivar 1965, 59 y.o. female MRN: [id number]  [facility] Encounter: [id number]  Primary Care Provider: Rhina Pettit MD   Date and time admitted to hospital: 6/13/2024 11:11 PM    * Acute on chronic diastolic heart failure (HCC)  Assessment & Plan  Wt Readings from Last 3 Encounters:   06/17/24 102 kg (224 lb 3.3 oz)   04/28/24 99.3 kg (218 lb 14.7 oz)   04/01/24 99.6 kg (219 lb 9.6 oz)     Noted meds from cardiology.  Will need tight control blood pressure.  Currently on Norvasc metoprolol and Bumex therapy.  Patient was weaned off of nitroglycerin.          Flash pulmonary edema (HCC)  Assessment & Plan  Progressively worsening shortness of breath throughout the day which became severe this evening  Initial  systolic with respiratory distress, chest x-ray with concern for pulmonary edema on my review awaiting official review, elevated BNP  Patient started on nitroglycerin drip and given IV Lasix 120 mg and initially placed on BiPAP although this was titrated off.  Patient off nitro now.  Patient currently on oral Bumex.  Hopeful discharge today or tomorrow.  Will need home O2 assessment with ambulation    Benign hypertension with CKD (chronic kidney disease) stage IV (HCC)  Assessment & Plan  Lab Results   Component Value Date    EGFR 32 06/17/2024    EGFR 31 06/16/2024    EGFR 33 06/15/2024    CREATININE 1.72 (H) 06/17/2024    CREATININE 1.75 (H) 06/16/2024    CREATININE 1.66 (H) 06/15/2024     Creatinine at baseline  Daily metabolic panel    Atrial flutter (HCC)  Assessment & Plan  Hx Paroxysmal afib/flutter s/p ablation in 2020 and 2022   Continue pta xarelto and Toprol     Cocaine abuse (HCC)  Assessment & Plan  Noted history     Paroxysmal A-fib (HCC)  Assessment & Plan  Metoprolol, Xarelto    Stage 4 chronic kidney disease (HCC)  Assessment & Plan  Lab Results   Component Value Date    EGFR 32 06/17/2024    EGFR 31  06/16/2024    EGFR 33 06/15/2024    CREATININE 1.72 (H) 06/17/2024    CREATININE 1.75 (H) 06/16/2024    CREATININE 1.66 (H) 06/15/2024   Creatinine appears close to baseline    History of monomorphic ventricular tachycardia, s/p ICD in 2016  Assessment & Plan  Noted history              Medical Problems       Resolved Problems  Date Reviewed: 4/1/2024   None         Admission Date:   Admission Orders (From admission, onward)       Ordered        06/14/24 0151  Inpatient Admission  Once                            Admitting Diagnosis: CHF (congestive heart failure) ([facility]) [I50.9]  Dyspnea [R06.00]  Flash pulmonary edema ([facility]) [J81.0]    HPI: This is a very pleasant 59-year-old female with past medical history significant for diastolic heart failure, monomorphic ventricular tachycardia with ICD history, atrial flutter status post ablation, chronic kidney disease stage IV, prior history of cocaine abuse, hypertension who presents to the hospital emergency room on June 13, 2024 with respiratory distress.  The patient presented with gradual onset shortness of breath.  She reported symptoms of shortness of breath worsening throughout the day.  She reports on the day prior to admission the shortness of breath became more severe.  She subsequently contacted EMS.  On arrival she was on BiPAP with a systolic blood pressure 230.  She was placed on intravenous nitroglycerin for blood pressure and given intravenous Lasix.  Patient had improvement in her symptoms and subsequent was titrated off of BiPAP.  She denies any chest pain currently.    Procedures Performed:   Orders Placed This Encounter   Procedures    Critical Care    POC Cardiac US    POC Cardiac US       Summary of Hospital Course: She was admitted for decompensated heart failure.  She was transitioned to oral diuretic therapy.  She will need close outpatient follow-up with cardiology        Condition at Discharge: fair         Discharge instructions/Information to  patient and family:   See after visit summary for information provided to patient and family.      Provisions for Follow-Up Care:  See after visit summary for information related to follow-up care and any pertinent home health orders.      PCP: Rhina Pettit MD    Disposition: Home    Planned Readmission: No    Discharge Statement   I spent 85 minutes discharging the patient. This time was spent on the day of discharge. I had direct contact with the patient on the day of discharge. Additional documentation is required if more than 30 minutes were spent on discharge.     Discharge Medications:  See after visit summary for reconciled discharge medications provided to patient and family.

## 2024-06-17 NOTE — ASSESSMENT & PLAN NOTE
Progressively worsening shortness of breath throughout the day which became severe this evening  Initial  systolic with respiratory distress, chest x-ray with concern for pulmonary edema on my review awaiting official review, elevated BNP  Patient started on nitroglycerin drip and given IV Lasix 120 mg and initially placed on BiPAP although this was titrated off.  Patient off nitro now.  Patient currently on oral Bumex.  Hopeful discharge today or tomorrow.  Will need home O2 assessment with ambulation

## 2024-06-17 NOTE — ASSESSMENT & PLAN NOTE
Wt Readings from Last 3 Encounters:   06/17/24 102 kg (224 lb 3.3 oz)   04/28/24 99.3 kg (218 lb 14.7 oz)   04/01/24 99.6 kg (219 lb 9.6 oz)     Noted meds from cardiology.  Will need tight control blood pressure.  Currently on Norvasc metoprolol and Bumex therapy.  Patient was weaned off of nitroglycerin.

## 2024-06-17 NOTE — ASSESSMENT & PLAN NOTE
Lab Results   Component Value Date    EGFR 32 06/17/2024    EGFR 31 06/16/2024    EGFR 33 06/15/2024    CREATININE 1.72 (H) 06/17/2024    CREATININE 1.75 (H) 06/16/2024    CREATININE 1.66 (H) 06/15/2024   Creatinine appears close to baseline

## 2024-06-17 NOTE — ASSESSMENT & PLAN NOTE
Lab Results   Component Value Date    EGFR 32 06/17/2024    EGFR 31 06/16/2024    EGFR 33 06/15/2024    CREATININE 1.72 (H) 06/17/2024    CREATININE 1.75 (H) 06/16/2024    CREATININE 1.66 (H) 06/15/2024     Creatinine at baseline  Daily metabolic panel

## 2024-06-17 NOTE — PLAN OF CARE
Problem: PAIN - ADULT  Goal: Verbalizes/displays adequate comfort level or baseline comfort level  Description: Interventions:  - Encourage patient to monitor pain and request assistance  - Assess pain using appropriate pain scale  - Administer analgesics based on type and severity of pain and evaluate response  - Implement non-pharmacological measures as appropriate and evaluate response  - Consider cultural and social influences on pain and pain management  - Notify physician/advanced practitioner if interventions unsuccessful or patient reports new pain  Outcome: Progressing     Problem: INFECTION - ADULT  Goal: Absence or prevention of progression during hospitalization  Description: INTERVENTIONS:  - Assess and monitor for signs and symptoms of infection  - Monitor lab/diagnostic results  - Monitor all insertion sites, i.e. indwelling lines, tubes, and drains  - Monitor endotracheal if appropriate and nasal secretions for changes in amount and color  - [place] appropriate cooling/warming therapies per order  - Administer medications as ordered  - Instruct and encourage patient and family to use good hand hygiene technique  - Identify and instruct in appropriate isolation precautions for identified infection/condition  Outcome: Progressing  Goal: Absence of fever/infection during neutropenic period  Description: INTERVENTIONS:  - Monitor WBC    Outcome: Progressing     Problem: SAFETY ADULT  Goal: Patient will remain free of falls  Description: INTERVENTIONS:  - Educate patient/family on patient safety including physical limitations  - Instruct patient to call for assistance with activity   - Consult OT/PT to assist with strengthening/mobility   - Keep Call bell within reach  - Keep bed low and locked with side rails adjusted as appropriate  - Keep care items and personal belongings within reach  - Initiate and maintain comfort rounds  - Make Fall Risk Sign visible to staff  - Offer Toileting every 2 Hours,  in advance of need  - Initiate/Maintain alarm  - Obtain necessary fall risk management equipment  - Apply yellow socks and bracelet for high fall risk patients  - Consider moving patient to room near nurses station  Outcome: Progressing  Goal: Maintain or return to baseline ADL function  Description: INTERVENTIONS:  -  Assess patient's ability to carry out ADLs; assess patient's baseline for ADL function and identify physical deficits which impact ability to perform ADLs (bathing, care of mouth/teeth, toileting, grooming, dressing, etc.)  - Assess/evaluate cause of self-care deficits   - Assess range of motion  - Assess patient's mobility; develop plan if impaired  - Assess patient's need for assistive devices and provide as appropriate  - Encourage maximum independence but intervene and supervise when necessary  - Involve family in performance of ADLs  - Assess for home care needs following discharge   - Consider OT consult to assist with ADL evaluation and planning for discharge  - Provide patient education as appropriate  Outcome: Progressing  Goal: Maintains/Returns to pre admission functional level  Description: INTERVENTIONS:  - Perform AM-PAC 6 Click Basic Mobility/ Daily Activity assessment daily.  - Set and communicate daily mobility goal to care team and patient/family/caregiver.   - Collaborate with rehabilitation services on mobility goals if consulted  - Perform Range of Motion 3 times a day.  - Reposition patient every 2 hours.  - Dangle patient 3 times a day  - Stand patient 3 times a day  - Ambulate patient 3 times a day  - Out of bed to chair 3 times a day   - Out of bed for meals 3 times a day  - Out of bed for toileting  - Record patient progress and toleration of activity level   Outcome: Progressing     Problem: DISCHARGE PLANNING  Goal: Discharge to home or other facility with appropriate resources  Description: INTERVENTIONS:  - Identify barriers to discharge w/patient and caregiver  - Arrange  for needed discharge resources and transportation as appropriate  - Identify discharge learning needs (meds, wound care, etc.)  - Arrange for interpretive services to assist at discharge as needed  - Refer to Case Management Department for coordinating discharge planning if the patient needs post-hospital services based on physician/advanced practitioner order or complex needs related to functional status, cognitive ability, or social support system  Outcome: Progressing     Problem: Knowledge Deficit  Goal: Patient/family/caregiver demonstrates understanding of disease process, treatment plan, medications, and discharge instructions  Description: Complete learning assessment and assess knowledge base.  Interventions:  - Provide teaching at level of understanding  - Provide teaching via preferred learning methods  Outcome: Progressing     Problem: CARDIOVASCULAR - ADULT  Goal: Maintains optimal cardiac output and hemodynamic stability  Description: INTERVENTIONS:  - Monitor I/O, vital signs and rhythm  - Monitor for S/S and trends of decreased cardiac output  - Administer and titrate ordered vasoactive medications to optimize hemodynamic stability  - Assess quality of pulses, skin color and temperature  - Assess for signs of decreased coronary artery perfusion  - Instruct patient to report change in severity of symptoms  Outcome: Progressing  Goal: Absence of cardiac dysrhythmias or at baseline rhythm  Description: INTERVENTIONS:  - Continuous cardiac monitoring, vital signs, obtain 12 lead EKG if ordered  - Administer antiarrhythmic and heart rate control medications as ordered  - Monitor electrolytes and administer replacement therapy as ordered  Outcome: Progressing     Problem: RESPIRATORY - ADULT  Goal: Achieves optimal ventilation and oxygenation  Description: INTERVENTIONS:  - Assess for changes in respiratory status  - Assess for changes in mentation and behavior  - Position to facilitate oxygenation and  minimize respiratory effort  - Oxygen administered by appropriate delivery if ordered  - Initiate smoking cessation education as indicated  - Encourage broncho-pulmonary hygiene including cough, deep breathe, Incentive Spirometry  - Assess the need for suctioning and aspirate as needed  - Assess and instruct to report SOB or any respiratory difficulty  - Respiratory Therapy support as indicated  Outcome: Progressing

## 2024-06-17 NOTE — PROGRESS NOTES
Heart Failure Outpatient Progress Note - Lupe Menjivar 59 y.o. female MRN: [id number]    @ Encounter: [id number]      Assessment/Plan:    Patient Active Problem List    Diagnosis Date Noted    Flash pulmonary edema (HCC) 06/14/2024    Acute on chronic diastolic heart failure (HCC) 06/14/2024    Constipation 02/20/2024    Abdominal pain 02/18/2024    Fungal skin disease 02/18/2024    BRBPR (bright red blood per rectum) 02/18/2024    Primary osteoarthritis of first carpometacarpal joint of left hand 11/29/2023    Sprain of metacarpophalangeal (MCP) joint of left thumb, initial encounter 11/29/2023    HTN (hypertension) 10/05/2023    Benign hypertension with CKD (chronic kidney disease) stage IV (HCC) 09/26/2023    Facial numbness, resolved 09/13/2023    Anemia due to chronic kidney disease 08/25/2023    History of colonic diverticulitis 08/25/2023    Tinea 08/25/2023    Hypokalemia 07/23/2023    Blood in stool 07/16/2023    Abnormal finding on CT scan 05/08/2023    Chronic pain of both knees 03/08/2023    Homeless 03/08/2023    Morbid obesity 01/14/2023    Anemia in stage 4 chronic kidney disease  01/14/2023    Left low back pain 11/05/2022    Chronic heart failure with preserved ejection fraction (HCC) 10/13/2022    Acquired hallux valgus of left foot 09/08/2022    Renal cyst 08/12/2022    Diverticulitis of large intestine without perforation or abscess 06/23/2022    Leukopenia 06/18/2022    Right renal stone 06/09/2022    Hepatitis C 01/30/2022    Lumbar radiculopathy 08/26/2021    Acute right flank pain 08/15/2021    History of tachycardia induced cardiomyopathy 05/25/2021    Cocaine use 06/02/2020    Mixed hyperlipidemia 06/02/2020    Implantable cardioverter-defibrillator (ICD) in situ 06/02/2020    SOB (shortness of breath) 05/25/2020    Paroxysmal A-fib (HCC) 05/14/2020    Cocaine abuse (HCC) 05/14/2020    Elevated lipase 10/10/2019    Nonischemic nontraumatic myocardial injury due to CHF 09/09/2019    Mild  acute on chronic diastolic congestive heart failure (HCC) 01/23/2019    Headache 12/26/2018    Stage 4 chronic kidney disease (HCC) 12/26/2018    Tobacco abuse 12/26/2018    History of monomorphic ventricular tachycardia, s/p ICD in 2016 07/13/2016    LVH (left ventricular hypertrophy) 07/12/2016    Obstructive sleep apnea, adult 07/11/2016    Osteoarthritis 07/11/2016    Sciatica 07/11/2016    Chest pain 03/20/2016    Gastroesophageal reflux disease  03/20/2016    Atrial flutter (HCC) 03/20/2016     Plan:  H/O recurrent hypertensive urgency.   -due to medication nonadherence  -BP on most recent admission 265/128 with flash pulmonary edema  -Required NTG gtt  -Rx Metoprolol + amlodipine 10 mg daily       History of atrial flutter / atrial fibrillation               [id number] = 3 (sex, HF, HTN).              Anticoagulation on Xarelto.               S/p unspecified ablation at [facility] in 2015.              S/p Afib ablation with PVI in 05/2020.              S/p atypical flutter and micro-reentrant atrial tachycardia ablation in 06/2022.              S/p AV node ablation with ICD reprogramming on 05/10/2023.              Rate control: metoprolol succinate 50 mg daily.              Rhythm control: None.     Chronic HFimpEF.  -Etiology: nonischemic;  tachy-mediated in 05/2021 (LVEF 30%) and again in 06/2022 (LVEF 40-45%) in setting of rapid Aflutter, possible contribution from uncontrolled HTN  -Warm, euvolemic on exam  -MAP significantly improved but still elevated today.   -Consider addition of Iso/hydral for better MAP control if still sub optimal at next visit, though adherence may be an issue  -Will price check SGLT2i  -Needs post hospital BMP this week    Weight of 235,242 lbs, 236 lbs,  239 lbs on 10/5, 244 lbs 10/6, 240 lbs, 241, 224 lbs at discharge, today, 227                TTE 03/2016: LVEF 60-65%.  C 01/07/2019: no obstructive CAD.   TTE 04/18/2019: LVEF 65%.  TTE 09/18/2020: LVEF 65%.   TTE  05/25/2021: LVEF 30%. LVIDd 3.7 cm.  Reduced RVSF. Trace MR and TR.   TTE 01/31/2022: LVEF 55%. LVIDd 4.8 cm. Grade 2 DD. Mildly reduced RVSF.  TTE (limited) 06/19/2022: LVEF 40-45%. LVIDd 4.1 cm.   TTE (limited) 07/28/2022: LVEF 55%. Normal RV.   TTE 04/05/2023: LVEF 65%. LVIDd 3.6 cm. Normal RV. Mild TR.   TTE (limited) 05/12/2023: LVEF 65%. Normal RV.   NM stress 10/6/23:    No major reversible ischemia/perfusion defect noted.  There is significant underlying artifact due to increased extracardiac tracer uptake.    Stress ECG: The ECG was not diagnostic due to resting ST-T abnormalities due to AV pacing.    Perfusion: Suboptimal imaging due to increased extracardiac tracer uptake.  No major reversible ischemia/perfusion defect noted.    Stress Function: Left ventricular function post-stress is normal.  Calculated ejection fraction is 55%.    Perfusion Defect Conclusion: There is no evidence of transient ischemic dilation (TID).  - TTE 6/14/24: LVEF 60%, grade I DD, dilated atria                              Pharmacotherapies / Neurohormonal Blockade:   --Beta Blocker: Metoprolol succinate 50 mg daily  --ARNi / ACEi / ARB: No, CKD precludes.    --Aldosterone Antagonist: No, CKD precludes.   --SGLT2 Inhibitor: borderline GFR  --Diuretic: Bumex 2 mg BID     Sudden Cardiac Death Risk Reduction:  --Medtronic dual chamber ICD in situ since 07/2016.  --Interrogation : For device check today,      Electrical Resynchronization:  --Candidacy for BiV device: RBBB with  ms.     Chronic kidney disease, stage IV Baseline creatinine  1.5-1.6. 1.7 on discharge. For BMP this week.    Follows with Dr. Meza as outpatient.  History of monomorphic ventricular tachycardia: noted on outpatient loop recorder; s/p secondary prevention ICD in 07/2016.  Obstructive sleep apnea  Tobacco abuse: currently smoking 3 cigarettes per day.   History of cocaine use: last used in 03/2023.  Marijuana use:  occasionally     HPI:   6/19/24  Lupe Menjivar is a 58-year-old woman with a PMH as above who presents to the office for hospital follow-up. Follows with Dr. Diamond. Admitted with HTN urgency and respiratory distress due to flash pulmonary edema. SBP in the 220s on arrival. Placed on NTG gtt and IV lasix. BP improved. Medical therapies optimized. Discharged home without difficulty.  He reports feeling well today.  We discussed the reasons for her recurrent hospital admissions.  She admits that she ran out of her medications and waited too long to request refills.  Additionally was homeless at the time and so had no way of affording her medications or a way to obtain them.  Since her discharge she has been 100% adherent with her medical therapies.  She has been weighing herself daily.  She is abstaining from recreational drug use but is having difficulty quitting smoking.  She is now living in an apartment near [place] has access to the lab and home*pharmacy.         Past Medical History:   Diagnosis Date    ACS (acute coronary syndrome) ([facility]) 02/07/2021    Acute on chronic diastolic CHF 01/23/2019    Anemia 01/14/2023    Arthritis     Atrial fibrillation with rapid ventricular response ([facility]) 03/20/2016    Breast lump     CKD (chronic kidney disease) stage 3, GFR 30-59 ml/min ([facility])     Disease of thyroid gland     Elevated troponin 09/09/2019    Epigastric abdominal tenderness 08/15/2021    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 ([facility]) 05/25/2020    GERD (gastroesophageal reflux disease)     H/O transfusion 1987    Hepatitis C     resolved    History of tachycardia induced cardiomyopathy 05/2021    in setting of rapid atrial flutter in 05/2021 and again 06/2022    History of ventricular tachycardia 07/2016    s/p ICD    Hyperlipidemia     Hypertension     Hypokalemia 07/23/2023    Inappropriate ICD discharge for atrial flutter 04/2023    NSTEMI (non-ST elevated myocardial infarction) ([facility]) 12/26/2018    Sleep  apnea     no cpap       12 point ROS negative other than that stated in HPI    Allergies   Allergen Reactions    Coconut Oil - Food Allergy Hives    Iodinated Contrast Media Hives    Tape  [Medical Tape] Hives    Tramadol Hives and Rash     .    Current Outpatient Medications:     amLODIPine (NORVASC) 5 mg tablet, Take 1 tablet (5 mg total) by mouth 2 (two) times a day, Disp: 60 tablet, Rfl: 0    bumetanide (BUMEX) 2 mg tablet, Take 1 tablet (2 mg total) by mouth 2 (two) times a day, Disp: 60 tablet, Rfl: 0    ferrous gluconate (FERGON) 240 (27 FE) MG tablet, Take 0.5 tablets (120 mg total) by mouth 3 (three) times a week (Patient taking differently: Take 120 mg by mouth 3 (three) times a week [facility]), Disp: 6 tablet, Rfl: 0    metoprolol succinate (TOPROL-XL) 50 mg 24 hr tablet, TAKE 1 TABLET(50 MG) BY MOUTH DAILY, Disp: 30 tablet, Rfl: 3    nystatin (MYCOSTATIN) powder, Apply topically in the morning, Disp: 1 g, Rfl: 1    ondansetron (Zofran ODT) 4 mg disintegrating tablet, Take 1 tablet (4 mg total) by mouth every 6 (six) hours as needed for nausea or vomiting, Disp: 20 tablet, Rfl: 0    oxyCODONE (ROXICODONE) 5 immediate release tablet, Take 5 mg by mouth every 6 (six) hours as needed, Disp: , Rfl:     pantoprazole (PROTONIX) 40 mg tablet, TAKE 1 TABLET(40 MG) BY MOUTH DAILY, Disp: 30 tablet, Rfl: 5    polyethylene glycol (MIRALAX) 17 g packet, Take 17 g by mouth daily, Disp: 10 each, Rfl: 0    potassium chloride (Klor-Con M20) 20 mEq tablet, Take 1 tablet (20 mEq total) by mouth daily, Disp: 360 tablet, Rfl: 0    rivaroxaban (XARELTO) 15 mg tablet, Take 1 tablet (15 mg total) by mouth every evening, Disp: 30 tablet, Rfl: 11  No current facility-administered medications for this visit.    Social History     Socioeconomic History    Marital status: /Civil Union     Spouse name: Not on file    Number of children: Not on file    Years of education: 12    Highest education level: Not on file   Occupational  History    Not on file   Tobacco Use    Smoking status: Every Day     Current packs/day: 0.50     Average packs/day: 0.5 packs/day for 35.0 years (17.5 ttl pk-yrs)     Types: Cigarettes    Smokeless tobacco: Never   Vaping Use    Vaping status: Never Used   Substance and Sexual Activity    Alcohol use: Not Currently    Drug use: Yes     Types: Marijuana    Sexual activity: Yes     Partners: Male     Birth control/protection: None   Other Topics Concern    Not on file   Social History Narrative    Disabled        · Do you currently or have you served in the [employer]:   No      · Were you activated, into active duty, as a member of the [employer] or as a Reservist:   No      · Diet:   Regular      · General stress level:   High      · Has smoked since age:   16      · Caffeine intake:   Moderate      · Guns present in home:   No      · Seat belts used routinely:   Yes      · Sunscreen used routinely:   No      · Advance directive:   No      · Live alone or with others:   with others      · International travel:   no      · Pets:   No      · Blind or serious difficulty seeing:   Yes     · Difficulty concentrating, remembering or making decisions:   No      · Difficulty walking or climbing stairs:   Yes      · Difficulty dressing or bathing:   No      · Difficulty doing errands alone:   No      · What is the highest grade or level of school you have completed or the highest degree you have received:   12th grade, no diploma      · How many days of moderate to strenuous exercise, like a brisk walk, did you do in the last 7 days:   0      · How hard is it for you to pay for the very basics like food, housing, medical care, and heating:   Somewhat hard      · Do you feel stress - tense, restless, nervous, or anxious, or unable to sleep at night because your mind is troubled all the time - these days:   Only a little          Social Determinants of Health     Financial Resource Strain: Low Risk  (1/5/2024)     "Received from [facility], [facility]    Overall Financial Resource Strain (CARDIA)     Difficulty of Paying Living Expenses: Not hard at all   Food Insecurity: No Food Insecurity (6/14/2024)    Hunger Vital Sign     Worried About Running Out of Food in the Last Year: Never true     Ran Out of Food in the Last Year: Never true   Transportation Needs: No Transportation Needs (6/14/2024)    PRAPARE - Transportation     Lack of Transportation (Medical): No     Lack of Transportation (Non-Medical): No   Physical Activity: Not on file   Stress: Not on file   Social Connections: Not on file   Intimate Partner Violence: Not At Risk (1/5/2024)    Received from [facility], [facility]    Humiliation, Afraid, Rape, and Kick questionnaire     Fear of Current or Ex-Partner: No     Emotionally Abused: No     Physically Abused: No     Sexually Abused: No   Housing Stability: Low Risk  (6/14/2024)    Housing Stability Vital Sign     Unable to Pay for Housing in the Last Year: No     Number of Times Moved in the Last Year: 0     Homeless in the Last Year: No       Family History   Problem Relation Age of Onset    Arthritis Family     Cancer Family     Diabetes Family     Hypertension Family     Cancer Maternal Grandmother        Physical Exam:    Vitals /80 (BP Location: Left arm, Patient Position: Sitting, Cuff Size: Standard)   Pulse 88   Ht 5' 7\" (1.702 m)   Wt 103 kg (227 lb 4.8 oz)   SpO2 97%   BMI 35.60 kg/m²     Wt Readings from Last 3 Encounters:   06/17/24 102 kg (224 lb 3.3 oz)   04/28/24 99.3 kg (218 lb 14.7 oz)   04/01/24 99.6 kg (219 lb 9.6 oz)         GEN: Lupe Menjivar appears well, alert and oriented x 3, pleasant and cooperative   HEENT: pupils equal, round, and reactive to light; extraocular muscles intact  NECK: supple, no carotid bruits   HEART: irregular rhythm, normal S1 and S2, no murmurs, clicks, gallops or rubs, JVP is " flat  LUNGS: clear to auscultation bilaterally; no wheezes, rales, or rhonchi   ABDOMEN: normal bowel sounds, soft, no tenderness, no distention  EXTREMITIES: peripheral pulses normal; no clubbing, cyanosis, or edema  NEURO: no focal findings   SKIN: normal without suspicious lesions on exposed skin    Labs & Results:    Chemistry        Component Value Date/Time    K 3.6 06/17/2024 0548    K 3.8 01/06/2024 0507    CL 99 06/17/2024 0548     01/06/2024 0507    CO2 27 06/17/2024 0548    CO2 23 06/13/2024 2322    CO2 27 01/06/2024 0507    BUN 29 (H) 06/17/2024 0548    BUN 17 01/06/2024 0507    CREATININE 1.72 (H) 06/17/2024 0548    CREATININE 1.58 (H) 01/06/2024 0507        Component Value Date/Time    CALCIUM 8.8 06/17/2024 0548    CALCIUM 8.8 01/06/2024 0507    ALKPHOS 75 06/13/2024 2327    ALKPHOS 93 01/05/2024 1032    AST 15 06/13/2024 2327    AST 13 01/05/2024 1032    ALT 7 06/13/2024 2327    ALT 6 01/05/2024 1032        Lab Results   Component Value Date    WBC 4.20 (L) 06/15/2024    HGB 10.7 (L) 06/15/2024    HCT 35.3 06/15/2024    MCV 84 06/15/2024     (L) 06/15/2024     Lab Results   Component Value Date    NTBNP 9,053 (H) 01/13/2023      Lab Results   Component Value Date    NTBNP 9,053 (H) 01/13/2023      Lab Results   Component Value Date    LDLCALC 68 04/05/2023     Lab Results   Component Value Date    RYK0VHCBCMBV 1.676 07/16/2023         EKG personally reviewed by CHARLOTTE Rausch.   No results found for this visit on 06/19/24.     Counseling / Coordination of Care  Total face to face time spent with patient 20 minutes.  An additional 10 minutes was spent for chart/data review and visit preparation.         Thank you for the opportunity to participate in the care of this patient.    CHARLOTTE Rausch

## 2024-06-18 NOTE — UTILIZATION REVIEW
NOTIFICATION OF ADMISSION DISCHARGE   This is a Notification of Discharge from [facility]. Please be advised that this patient has been discharge from our facility. Below you will find the admission and discharge date and time including the patient’s disposition.   UTILIZATION REVIEW CONTACT:  Sulaiman Ontiveros  Utilization   Network Utilization Review Department  Phone: [phone] x carefully listen to the prompts. All voicemails are confidential.  Email: [email or web address]     ADMISSION INFORMATION  PRESENTATION DATE: 6/13/2024 11:11 PM  OBERVATION ADMISSION DATE:   INPATIENT ADMISSION DATE: 6/14/24  1:51 AM   DISCHARGE DATE: 6/17/2024  3:22 PM   DISPOSITION:Home/Self Care    Network Utilization Review Department  ATTENTION: Please call with any questions or concerns to [phone] and carefully listen to the prompts so that you are directed to the right person. All voicemails are confidential.   For Discharge needs, contact Care Management DC Support Team at [phone] opt. [phone]  Send all requests for admission clinical reviews, approved or denied determinations and any other requests to dedicated fax number below belonging to the campus where the patient is receiving treatment. List of dedicated fax numbers for the Facilities:  FACILITY NAME UR FAX NUMBER   ADMISSION DENIALS (Administrative/Medical Necessity) [phone]   DISCHARGE SUPPORT TEAM ([facility]) [phone]   PARENT CHILD HEALTH (Maternity/NICU/Pediatrics) [phone]   [facility] [phone]   [facility] [phone]   Replaced by [facility] [phone]   [facility] [phone]   [facility] [phone]   [facility] [phone]   [facility] [phone]   [facility] [phone]   [facility]  [facility] [phone]   Critical access hospital [phone]   [facility] [phone]   [facility] [phone]

## 2024-06-19 ENCOUNTER — IN-CLINIC DEVICE VISIT (OUTPATIENT)
Dept: CARDIOLOGY CLINIC | Facility: CLINIC | Age: 59
End: 2024-06-19
Payer: COMMERCIAL

## 2024-06-19 ENCOUNTER — OFFICE VISIT (OUTPATIENT)
Dept: CARDIOLOGY CLINIC | Facility: CLINIC | Age: 59
End: 2024-06-19
Payer: COMMERCIAL

## 2024-06-19 VITALS
WEIGHT: 227.3 LBS | HEIGHT: 67 IN | OXYGEN SATURATION: 97 % | SYSTOLIC BLOOD PRESSURE: 142 MMHG | HEART RATE: 88 BPM | DIASTOLIC BLOOD PRESSURE: 80 MMHG | BODY MASS INDEX: 35.67 KG/M2

## 2024-06-19 DIAGNOSIS — I50.33 ACUTE ON CHRONIC HEART FAILURE WITH PRESERVED EJECTION FRACTION (HCC): ICD-10-CM

## 2024-06-19 DIAGNOSIS — E87.6 HYPOKALEMIA: ICD-10-CM

## 2024-06-19 DIAGNOSIS — N18.32 STAGE 3B CHRONIC KIDNEY DISEASE (HCC): ICD-10-CM

## 2024-06-19 DIAGNOSIS — E87.8 ELECTROLYTE ABNORMALITY: ICD-10-CM

## 2024-06-19 DIAGNOSIS — R06.00 DYSPNEA: ICD-10-CM

## 2024-06-19 DIAGNOSIS — I50.33 ACUTE ON CHRONIC DIASTOLIC HEART FAILURE (HCC): ICD-10-CM

## 2024-06-19 DIAGNOSIS — Z95.810 PRESENCE OF IMPLANTABLE CARDIOVERTER-DEFIBRILLATOR (ICD): Primary | ICD-10-CM

## 2024-06-19 DIAGNOSIS — I50.33 ACUTE ON CHRONIC DIASTOLIC (CONGESTIVE) HEART FAILURE (HCC): ICD-10-CM

## 2024-06-19 PROCEDURE — 99214 OFFICE O/P EST MOD 30 MIN: CPT | Performed by: NURSE PRACTITIONER

## 2024-06-19 PROCEDURE — 93283 PRGRMG EVAL IMPLANTABLE DFB: CPT | Performed by: INTERNAL MEDICINE

## 2024-06-19 RX ORDER — BUMETANIDE 2 MG/1
2 TABLET ORAL 2 TIMES DAILY
Qty: 60 TABLET | Refills: 3 | Status: SHIPPED | OUTPATIENT
Start: 2024-06-19 | End: 2024-06-20

## 2024-06-19 RX ORDER — POTASSIUM CHLORIDE 20 MEQ/1
20 TABLET, EXTENDED RELEASE ORAL DAILY
Qty: 360 TABLET | Refills: 2 | Status: SHIPPED | OUTPATIENT
Start: 2024-06-19

## 2024-06-19 RX ORDER — AMLODIPINE BESYLATE 5 MG/1
5 TABLET ORAL 2 TIMES DAILY
Qty: 60 TABLET | Refills: 3 | Status: SHIPPED | OUTPATIENT
Start: 2024-06-19

## 2024-06-19 RX ORDER — METOPROLOL SUCCINATE 50 MG/1
50 TABLET, EXTENDED RELEASE ORAL DAILY
Qty: 30 TABLET | Refills: 3 | Status: SHIPPED | OUTPATIENT
Start: 2024-06-19

## 2024-06-19 NOTE — PATIENT INSTRUCTIONS
Weigh yourself daily  If you gain 3 lbs in one day or 5 lbs in one week, please call the office at [phone] and ask for a nurse or the heart failure nurse  Keep your sodium intake to <2 grams, (2000 mg) per day, and fluids <2 Liters (2000 ml) per day. This is around 6-7, 8 oz glasses of fluid per day    Get lab work before end of the week.

## 2024-06-19 NOTE — PROGRESS NOTES
Results for orders placed or performed in visit on 06/19/24   Cardiac EP device report    Narrative    MDT-DUAL CHAMBER ICD (AAI-DDD MODE) - ACTIVE SYSTEM IS MRI CONDITIONAL  DEVICE INTERROGATED IN THE [facility] OFFICE. BATTERY VOLTAGE ADEQUATE (14 MTHS). AP 22.3%  99.5%. ALL LEAD PARAMETERS WITHIN NORMAL LIMITS. 26 MONITORED AT AF EPISODES, AVAIL EGM'S SHOWING AFIB, MAX DURATION 2 HR, 53 MINS, W/EPISODE IN PROGRESS AT PRESENT. AF BURDEN 76.8%.  PT TAKES XARELTO, METOPROLOL SUCC.  NO PROGRAMMING CHANGES MADE TO DEVICE PARAMETERS. NORMAL DEVICE FUNCTION.  PAS/NC

## 2024-06-20 ENCOUNTER — TELEPHONE (OUTPATIENT)
Dept: CARDIOLOGY CLINIC | Facility: CLINIC | Age: 59
End: 2024-06-20

## 2024-06-20 DIAGNOSIS — I50.33 ACUTE ON CHRONIC HEART FAILURE WITH PRESERVED EJECTION FRACTION (HCC): ICD-10-CM

## 2024-06-20 RX ORDER — BUMETANIDE 2 MG/1
2 TABLET ORAL DAILY
Qty: 60 TABLET | Refills: 3 | Status: SHIPPED | OUTPATIENT
Start: 2024-06-20

## 2024-06-20 NOTE — TELEPHONE ENCOUNTER
Called patient and gave instructions.         ----- Message from CHARLOTTE Lambert sent at 6/20/2024  3:01 PM EDT -----  Dianna Pang,  Can you please call Lupe and let her know that I called her Jardiance into [facility].  Because the Jardiance will make her urinate more, she can reduce her Bumex to just 2 mg daily.  Please tell her to hold off on getting lab work until she has started the Jardiance.  Once she starts it, please tell her to have her lab work drawn in about 3 days after that.    Thank you  Nevaeh

## 2024-06-20 NOTE — TELEPHONE ENCOUNTER
Called insurance to get price check and got disconnected.       ----- Message from Ellie SHERIDAN sent at 6/19/2024 12:48 PM EDT -----    ----- Message -----  From: CHARLOTTE Rausch  Sent: 6/19/2024  10:28 AM EDT  To: Cardiology [facility],    Need price check on Jardiance 10 mg daily for this patient thanks
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
## 2024-07-18 ENCOUNTER — OFFICE VISIT (OUTPATIENT)
Dept: CARDIOLOGY CLINIC | Facility: CLINIC | Age: 59
End: 2024-07-18
Payer: COMMERCIAL

## 2024-07-18 VITALS
SYSTOLIC BLOOD PRESSURE: 118 MMHG | BODY MASS INDEX: 34.69 KG/M2 | WEIGHT: 221 LBS | HEART RATE: 63 BPM | HEIGHT: 67 IN | OXYGEN SATURATION: 97 % | DIASTOLIC BLOOD PRESSURE: 78 MMHG

## 2024-07-18 DIAGNOSIS — Z72.0 TOBACCO ABUSE: ICD-10-CM

## 2024-07-18 DIAGNOSIS — N18.32 STAGE 3B CHRONIC KIDNEY DISEASE (HCC): ICD-10-CM

## 2024-07-18 DIAGNOSIS — E87.8 ELECTROLYTE ABNORMALITY: ICD-10-CM

## 2024-07-18 DIAGNOSIS — I48.0 PAROXYSMAL A-FIB (HCC): ICD-10-CM

## 2024-07-18 DIAGNOSIS — Z95.810 PRESENCE OF IMPLANTABLE CARDIOVERTER-DEFIBRILLATOR (ICD): ICD-10-CM

## 2024-07-18 DIAGNOSIS — I50.33 ACUTE ON CHRONIC HEART FAILURE WITH PRESERVED EJECTION FRACTION (HCC): Primary | ICD-10-CM

## 2024-07-18 PROCEDURE — 99214 OFFICE O/P EST MOD 30 MIN: CPT | Performed by: INTERNAL MEDICINE

## 2024-07-18 NOTE — TELEPHONE ENCOUNTER
Per Dr. Diamond at today's ov, refills sent to [facility] on [address], 14 day sample given , pt stated that she is completely out due to not knowing which pharmacy the last refills were sent to.    Xarelto 15 mg  Lot#: [id number]  Exp. Date: 11/25  Count Dispensed: 2

## 2024-08-24 ENCOUNTER — HOSPITAL ENCOUNTER (INPATIENT)
Facility: HOSPITAL | Age: 59
LOS: 2 days | Discharge: HOME | DRG: 135 | End: 2024-08-26
Attending: SURGERY | Admitting: SURGERY
Payer: COMMERCIAL

## 2024-08-24 ENCOUNTER — APPOINTMENT (EMERGENCY)
Dept: RADIOLOGY | Facility: HOSPITAL | Age: 59
DRG: 135 | End: 2024-08-24
Payer: COMMERCIAL

## 2024-08-24 DIAGNOSIS — T40.601A OPIATE OR RELATED NARCOTIC OVERDOSE, ACCIDENTAL OR UNINTENTIONAL, INITIAL ENCOUNTER (HCC): ICD-10-CM

## 2024-08-24 DIAGNOSIS — S22.43XA MULTIPLE FRACTURES OF RIBS, BILATERAL, INITIAL ENCOUNTER FOR CLOSED FRACTURE: Primary | ICD-10-CM

## 2024-08-24 PROBLEM — I10 ESSENTIAL HYPERTENSION: Status: ACTIVE | Noted: 2024-08-24

## 2024-08-24 PROBLEM — I48.92 ATRIAL FLUTTER BY ELECTROCARDIOGRAM (HCC): Status: ACTIVE | Noted: 2024-08-24

## 2024-08-24 LAB
ABO GROUP BLD: NORMAL
ABO GROUP BLD: NORMAL
ALBUMIN SERPL BCG-MCNC: 4.4 G/DL (ref 3.5–5)
ALBUMIN SERPL BCG-MCNC: 4.4 G/DL (ref 3.5–5)
ALP SERPL-CCNC: 70 U/L (ref 34–104)
ALP SERPL-CCNC: 70 U/L (ref 34–104)
ALT SERPL W P-5'-P-CCNC: 24 U/L (ref 7–52)
ALT SERPL W P-5'-P-CCNC: 24 U/L (ref 7–52)
AMPHETAMINES SERPL QL SCN: NEGATIVE
AMPHETAMINES SERPL QL SCN: NEGATIVE
ANION GAP SERPL CALCULATED.3IONS-SCNC: 10 MMOL/L (ref 4–13)
ANION GAP SERPL CALCULATED.3IONS-SCNC: 10 MMOL/L (ref 4–13)
APTT PPP: 31 SECONDS (ref 23–34)
APTT PPP: 31 SECONDS (ref 23–34)
AST SERPL W P-5'-P-CCNC: 49 U/L (ref 13–39)
AST SERPL W P-5'-P-CCNC: 49 U/L (ref 13–39)
BARBITURATES UR QL: NEGATIVE
BARBITURATES UR QL: NEGATIVE
BASOPHILS # BLD AUTO: 0.03 THOUSANDS/ÂΜL (ref 0–0.1)
BASOPHILS # BLD AUTO: 0.03 THOUSANDS/ÂΜL (ref 0–0.1)
BASOPHILS NFR BLD AUTO: 1 % (ref 0–1)
BASOPHILS NFR BLD AUTO: 1 % (ref 0–1)
BENZODIAZ UR QL: NEGATIVE
BENZODIAZ UR QL: NEGATIVE
BILIRUB SERPL-MCNC: 0.41 MG/DL (ref 0.2–1)
BILIRUB SERPL-MCNC: 0.41 MG/DL (ref 0.2–1)
BLD GP AB SCN SERPL QL: NEGATIVE
BLD GP AB SCN SERPL QL: NEGATIVE
BUN SERPL-MCNC: 19 MG/DL (ref 5–25)
BUN SERPL-MCNC: 19 MG/DL (ref 5–25)
CALCIUM SERPL-MCNC: 9.2 MG/DL (ref 8.4–10.2)
CALCIUM SERPL-MCNC: 9.2 MG/DL (ref 8.4–10.2)
CHLORIDE SERPL-SCNC: 104 MMOL/L (ref 96–108)
CHLORIDE SERPL-SCNC: 104 MMOL/L (ref 96–108)
CK SERPL-CCNC: 89 U/L (ref 26–192)
CK SERPL-CCNC: 89 U/L (ref 26–192)
CO2 SERPL-SCNC: 23 MMOL/L (ref 21–32)
CO2 SERPL-SCNC: 23 MMOL/L (ref 21–32)
COCAINE UR QL: NEGATIVE
COCAINE UR QL: NEGATIVE
CREAT SERPL-MCNC: 2.37 MG/DL (ref 0.6–1.3)
CREAT SERPL-MCNC: 2.37 MG/DL (ref 0.6–1.3)
EOSINOPHIL # BLD AUTO: 0.06 THOUSAND/ÂΜL (ref 0–0.61)
EOSINOPHIL # BLD AUTO: 0.06 THOUSAND/ÂΜL (ref 0–0.61)
EOSINOPHIL NFR BLD AUTO: 1 % (ref 0–6)
EOSINOPHIL NFR BLD AUTO: 1 % (ref 0–6)
ERYTHROCYTE [DISTWIDTH] IN BLOOD BY AUTOMATED COUNT: 15.9 % (ref 11.6–15.1)
ERYTHROCYTE [DISTWIDTH] IN BLOOD BY AUTOMATED COUNT: 15.9 % (ref 11.6–15.1)
FENTANYL UR QL SCN: POSITIVE
FENTANYL UR QL SCN: POSITIVE
GFR SERPL CREATININE-BSD FRML MDRD: 21 ML/MIN/1.73SQ M
GFR SERPL CREATININE-BSD FRML MDRD: 21 ML/MIN/1.73SQ M
GLUCOSE SERPL-MCNC: 368 MG/DL (ref 65–140)
GLUCOSE SERPL-MCNC: 368 MG/DL (ref 65–140)
HCT VFR BLD AUTO: 47.5 % (ref 34.8–46.1)
HCT VFR BLD AUTO: 47.5 % (ref 34.8–46.1)
HGB BLD-MCNC: 14.5 G/DL (ref 11.5–15.4)
HGB BLD-MCNC: 14.5 G/DL (ref 11.5–15.4)
HYDROCODONE UR QL SCN: NEGATIVE
HYDROCODONE UR QL SCN: NEGATIVE
IMM GRANULOCYTES # BLD AUTO: 0.05 THOUSAND/UL (ref 0–0.2)
IMM GRANULOCYTES # BLD AUTO: 0.05 THOUSAND/UL (ref 0–0.2)
IMM GRANULOCYTES NFR BLD AUTO: 1 % (ref 0–2)
IMM GRANULOCYTES NFR BLD AUTO: 1 % (ref 0–2)
INR PPP: 1.97 (ref 0.85–1.19)
INR PPP: 1.97 (ref 0.85–1.19)
LYMPHOCYTES # BLD AUTO: 1.74 THOUSANDS/ÂΜL (ref 0.6–4.47)
LYMPHOCYTES # BLD AUTO: 1.74 THOUSANDS/ÂΜL (ref 0.6–4.47)
LYMPHOCYTES NFR BLD AUTO: 27 % (ref 14–44)
LYMPHOCYTES NFR BLD AUTO: 27 % (ref 14–44)
MCH RBC QN AUTO: 27.6 PG (ref 26.8–34.3)
MCH RBC QN AUTO: 27.6 PG (ref 26.8–34.3)
MCHC RBC AUTO-ENTMCNC: 30.5 G/DL (ref 31.4–37.4)
MCHC RBC AUTO-ENTMCNC: 30.5 G/DL (ref 31.4–37.4)
MCV RBC AUTO: 90 FL (ref 82–98)
MCV RBC AUTO: 90 FL (ref 82–98)
METHADONE UR QL: NEGATIVE
METHADONE UR QL: NEGATIVE
MONOCYTES # BLD AUTO: 0.08 THOUSAND/ÂΜL (ref 0.17–1.22)
MONOCYTES # BLD AUTO: 0.08 THOUSAND/ÂΜL (ref 0.17–1.22)
MONOCYTES NFR BLD AUTO: 1 % (ref 4–12)
MONOCYTES NFR BLD AUTO: 1 % (ref 4–12)
NEUTROPHILS # BLD AUTO: 4.44 THOUSANDS/ÂΜL (ref 1.85–7.62)
NEUTROPHILS # BLD AUTO: 4.44 THOUSANDS/ÂΜL (ref 1.85–7.62)
NEUTS SEG NFR BLD AUTO: 69 % (ref 43–75)
NEUTS SEG NFR BLD AUTO: 69 % (ref 43–75)
NRBC BLD AUTO-RTO: 0 /100 WBCS
NRBC BLD AUTO-RTO: 0 /100 WBCS
OPIATES UR QL SCN: NEGATIVE
OPIATES UR QL SCN: NEGATIVE
OXYCODONE+OXYMORPHONE UR QL SCN: POSITIVE
OXYCODONE+OXYMORPHONE UR QL SCN: POSITIVE
PCP UR QL: NEGATIVE
PCP UR QL: NEGATIVE
PLATELET # BLD AUTO: 213 THOUSANDS/UL (ref 149–390)
PLATELET # BLD AUTO: 213 THOUSANDS/UL (ref 149–390)
PMV BLD AUTO: 11.5 FL (ref 8.9–12.7)
PMV BLD AUTO: 11.5 FL (ref 8.9–12.7)
POTASSIUM SERPL-SCNC: 5.3 MMOL/L (ref 3.5–5.3)
POTASSIUM SERPL-SCNC: 5.3 MMOL/L (ref 3.5–5.3)
PROT SERPL-MCNC: 7.8 G/DL (ref 6.4–8.4)
PROT SERPL-MCNC: 7.8 G/DL (ref 6.4–8.4)
PROTHROMBIN TIME: 22.5 SECONDS (ref 12.3–15)
PROTHROMBIN TIME: 22.5 SECONDS (ref 12.3–15)
RBC # BLD AUTO: 5.26 MILLION/UL (ref 3.81–5.12)
RBC # BLD AUTO: 5.26 MILLION/UL (ref 3.81–5.12)
RH BLD: POSITIVE
RH BLD: POSITIVE
SODIUM SERPL-SCNC: 137 MMOL/L (ref 135–147)
SODIUM SERPL-SCNC: 137 MMOL/L (ref 135–147)
SPECIMEN EXPIRATION DATE: NORMAL
SPECIMEN EXPIRATION DATE: NORMAL
THC UR QL: NEGATIVE
THC UR QL: NEGATIVE
WBC # BLD AUTO: 6.4 THOUSAND/UL (ref 4.31–10.16)
WBC # BLD AUTO: 6.4 THOUSAND/UL (ref 4.31–10.16)

## 2024-08-24 PROCEDURE — 86900 BLOOD TYPING SEROLOGIC ABO: CPT | Performed by: STUDENT IN AN ORGANIZED HEALTH CARE EDUCATION/TRAINING PROGRAM

## 2024-08-24 PROCEDURE — 80053 COMPREHEN METABOLIC PANEL: CPT | Performed by: SURGERY

## 2024-08-24 PROCEDURE — 80307 DRUG TEST PRSMV CHEM ANLYZR: CPT | Performed by: SURGERY

## 2024-08-24 PROCEDURE — 71045 X-RAY EXAM CHEST 1 VIEW: CPT

## 2024-08-24 PROCEDURE — 72125 CT NECK SPINE W/O DYE: CPT

## 2024-08-24 PROCEDURE — 70450 CT HEAD/BRAIN W/O DYE: CPT

## 2024-08-24 PROCEDURE — 99223 1ST HOSP IP/OBS HIGH 75: CPT | Performed by: SURGERY

## 2024-08-24 PROCEDURE — 85610 PROTHROMBIN TIME: CPT

## 2024-08-24 PROCEDURE — EDAIR PR ED AIR: Performed by: EMERGENCY MEDICINE

## 2024-08-24 PROCEDURE — 74176 CT ABD & PELVIS W/O CONTRAST: CPT

## 2024-08-24 PROCEDURE — 82550 ASSAY OF CK (CPK): CPT | Performed by: SURGERY

## 2024-08-24 PROCEDURE — 85730 THROMBOPLASTIN TIME PARTIAL: CPT | Performed by: SURGERY

## 2024-08-24 PROCEDURE — 71250 CT THORAX DX C-: CPT

## 2024-08-24 PROCEDURE — 93308 TTE F-UP OR LMTD: CPT | Performed by: SURGERY

## 2024-08-24 PROCEDURE — 36415 COLL VENOUS BLD VENIPUNCTURE: CPT | Performed by: STUDENT IN AN ORGANIZED HEALTH CARE EDUCATION/TRAINING PROGRAM

## 2024-08-24 PROCEDURE — 76705 ECHO EXAM OF ABDOMEN: CPT | Performed by: SURGERY

## 2024-08-24 PROCEDURE — 93005 ELECTROCARDIOGRAM TRACING: CPT

## 2024-08-24 PROCEDURE — 85025 COMPLETE CBC W/AUTO DIFF WBC: CPT | Performed by: SURGERY

## 2024-08-24 PROCEDURE — 99285 EMERGENCY DEPT VISIT HI MDM: CPT

## 2024-08-24 PROCEDURE — 86850 RBC ANTIBODY SCREEN: CPT | Performed by: STUDENT IN AN ORGANIZED HEALTH CARE EDUCATION/TRAINING PROGRAM

## 2024-08-24 PROCEDURE — 86901 BLOOD TYPING SEROLOGIC RH(D): CPT | Performed by: STUDENT IN AN ORGANIZED HEALTH CARE EDUCATION/TRAINING PROGRAM

## 2024-08-24 RX ORDER — SODIUM CHLORIDE, SODIUM GLUCONATE, SODIUM ACETATE, POTASSIUM CHLORIDE, MAGNESIUM CHLORIDE, SODIUM PHOSPHATE, DIBASIC, AND POTASSIUM PHOSPHATE .53; .5; .37; .037; .03; .012; .00082 G/100ML; G/100ML; G/100ML; G/100ML; G/100ML; G/100ML; G/100ML
75 INJECTION, SOLUTION INTRAVENOUS CONTINUOUS
Status: DISCONTINUED | OUTPATIENT
Start: 2024-08-24 | End: 2024-08-26

## 2024-08-24 RX ORDER — OXYCODONE HYDROCHLORIDE 5 MG/1
5 CAPSULE ORAL EVERY 4 HOURS PRN
COMMUNITY
End: 2024-08-26

## 2024-08-24 RX ORDER — OXYCODONE HYDROCHLORIDE 5 MG/1
5 TABLET ORAL EVERY 4 HOURS PRN
Status: DISCONTINUED | OUTPATIENT
Start: 2024-08-24 | End: 2024-08-26 | Stop reason: HOSPADM

## 2024-08-24 RX ORDER — HYDROMORPHONE HCL IN WATER/PF 6 MG/30 ML
0.2 PATIENT CONTROLLED ANALGESIA SYRINGE INTRAVENOUS EVERY 2 HOUR PRN
Status: DISCONTINUED | OUTPATIENT
Start: 2024-08-24 | End: 2024-08-26

## 2024-08-24 RX ORDER — AMLODIPINE BESYLATE 5 MG/1
5 TABLET ORAL 2 TIMES DAILY
Status: DISCONTINUED | OUTPATIENT
Start: 2024-08-24 | End: 2024-08-26 | Stop reason: HOSPADM

## 2024-08-24 RX ORDER — HEPARIN SODIUM 5000 [USP'U]/ML
5000 INJECTION, SOLUTION INTRAVENOUS; SUBCUTANEOUS EVERY 8 HOURS SCHEDULED
Status: DISCONTINUED | OUTPATIENT
Start: 2024-08-25 | End: 2024-08-25

## 2024-08-24 RX ORDER — AMLODIPINE BESYLATE 5 MG/1
5 TABLET ORAL DAILY
COMMUNITY

## 2024-08-24 RX ORDER — METOPROLOL TARTRATE 50 MG
50 TABLET ORAL EVERY 12 HOURS SCHEDULED
COMMUNITY

## 2024-08-24 RX ORDER — ENOXAPARIN SODIUM 100 MG/ML
30 INJECTION SUBCUTANEOUS EVERY 12 HOURS
Status: DISCONTINUED | OUTPATIENT
Start: 2024-08-24 | End: 2024-08-24

## 2024-08-24 RX ORDER — GABAPENTIN 300 MG/1
300 CAPSULE ORAL 3 TIMES DAILY
Status: DISCONTINUED | OUTPATIENT
Start: 2024-08-24 | End: 2024-08-26 | Stop reason: HOSPADM

## 2024-08-24 RX ORDER — PANTOPRAZOLE SODIUM 40 MG/1
40 TABLET, DELAYED RELEASE ORAL
Status: DISCONTINUED | OUTPATIENT
Start: 2024-08-25 | End: 2024-08-26 | Stop reason: HOSPADM

## 2024-08-24 RX ORDER — ONDANSETRON 2 MG/ML
4 INJECTION INTRAMUSCULAR; INTRAVENOUS EVERY 4 HOURS PRN
Status: DISCONTINUED | OUTPATIENT
Start: 2024-08-24 | End: 2024-08-26 | Stop reason: HOSPADM

## 2024-08-24 RX ORDER — METHOCARBAMOL 750 MG/1
750 TABLET, FILM COATED ORAL EVERY 6 HOURS SCHEDULED
Status: DISCONTINUED | OUTPATIENT
Start: 2024-08-24 | End: 2024-08-26 | Stop reason: HOSPADM

## 2024-08-24 RX ORDER — METOPROLOL SUCCINATE 50 MG/1
50 TABLET, EXTENDED RELEASE ORAL DAILY
Status: DISCONTINUED | OUTPATIENT
Start: 2024-08-25 | End: 2024-08-26 | Stop reason: HOSPADM

## 2024-08-24 RX ADMIN — OXYCODONE HYDROCHLORIDE 5 MG: 5 TABLET ORAL at 23:13

## 2024-08-24 RX ADMIN — ENOXAPARIN SODIUM 30 MG: 30 INJECTION SUBCUTANEOUS at 15:57

## 2024-08-24 RX ADMIN — METHOCARBAMOL 750 MG: 750 TABLET ORAL at 23:13

## 2024-08-24 RX ADMIN — METHOCARBAMOL 750 MG: 750 TABLET ORAL at 15:57

## 2024-08-24 RX ADMIN — GABAPENTIN 300 MG: 300 CAPSULE ORAL at 21:30

## 2024-08-24 RX ADMIN — OXYCODONE HYDROCHLORIDE 5 MG: 5 TABLET ORAL at 18:47

## 2024-08-24 RX ADMIN — ONDANSETRON 4 MG: 2 INJECTION INTRAMUSCULAR; INTRAVENOUS at 15:02

## 2024-08-24 RX ADMIN — SODIUM CHLORIDE, SODIUM GLUCONATE, SODIUM ACETATE, POTASSIUM CHLORIDE, MAGNESIUM CHLORIDE, SODIUM PHOSPHATE, DIBASIC, AND POTASSIUM PHOSPHATE 75 ML/HR: .53; .5; .37; .037; .03; .012; .00082 INJECTION, SOLUTION INTRAVENOUS at 15:03

## 2024-08-24 RX ADMIN — GABAPENTIN 300 MG: 300 CAPSULE ORAL at 15:57

## 2024-08-24 NOTE — ASSESSMENT & PLAN NOTE
Continue home amlodipine and metoprolol  Continue to monitor blood pressure  Follow-up with PCP as an outpatient

## 2024-08-24 NOTE — H&P
H&P - Trauma   Lupe Menjivar 59 y.o. female MRN: [id number]  Unit/Bed#: ED 27 Encounter: [id number]    Trauma Alert: Level B   Model of Arrival: Ambulance    Trauma Team: Attending Chriss and CHARLEY Cooper  Consultants:     None     Assessment & Plan   Active Problems / Assessment:   Presumed fall  Overdose     Plan:   - admit to trauma, MedSurg  - rib fx protocol  - pulm toilet: IS q1hr (awake), OOB as tolerated  - consult APS for pain control  - multimodal pain control  - continue neuro exams  - reg diet    Adni Song MD  General Surgery  08/24/24  5:47 PM      History of Present Illness     Chief complaint: chest wall pain following fall    HPI:    Lupe Menjivar with history of CAD & Afib (s/p pacemaker) that she takes Xarelto for, presenting today for evaluation after a presumed fall.  found patient down in kitchen for an unknown amount of time. Presumed to have fallen. EMS found patient to me minimally responsive with snoring respirations. Intranasal narcan was administered and patient became more responsive. Patient arrived into trauma bay without C-collar in place, GCS=14 (confusion), and AAOx2 (self, time) Complaining of neck, chest, abdominal, and mid-back pain.     Review of Systems   Constitutional:  Negative for chills, fatigue and fever.   HENT:  Negative for facial swelling.    Eyes:  Negative for discharge and redness.   Respiratory:  Positive for cough. Negative for choking, chest tightness, shortness of breath and wheezing.    Cardiovascular:  Positive for chest pain (left chest wall pain).   Gastrointestinal:  Positive for abdominal pain. Negative for abdominal distention.   Genitourinary:  Negative for pelvic pain.   Musculoskeletal:  Positive for back pain, myalgias and neck pain. Negative for gait problem and joint swelling.   Skin:  Positive for wound (R knee). Negative for color change.   Neurological:  Positive for tremors and syncope. Negative for weakness.   Hematological:   Bruises/bleeds easily (on xarelto).   Psychiatric/Behavioral:  Negative for agitation, behavioral problems, confusion and hallucinations.      12-point, complete review of systems was reviewed and negative except as stated above.     Historical Information     No past medical history on file.  No past surgical history on file.   Unable to obtain history due to Unreliable historian       There is no immunization history on file for this patient.  Last Tetanus: n/a  Family History: Non-contributory     Meds/Allergies   all current active meds have been reviewed and current meds:   Current Facility-Administered Medications   Medication Dose Route Frequency    enoxaparin (LOVENOX) subcutaneous injection 30 mg  30 mg Subcutaneous Q12H    gabapentin (NEURONTIN) capsule 300 mg  300 mg Oral TID    HYDROmorphone HCl (DILAUDID) injection 0.2 mg  0.2 mg Intravenous Q2H PRN    methocarbamol (ROBAXIN) tablet 750 mg  750 mg Oral Q6H JEISON    multi-electrolyte (PLASMALYTE-A/ISOLYTE-S PH 7.4) IV solution  75 mL/hr Intravenous Continuous    ondansetron (ZOFRAN) injection 4 mg  4 mg Intravenous Q4H PRN    oxyCODONE (ROXICODONE) split tablet 2.5 mg  2.5 mg Oral Q4H PRN    Or    oxyCODONE (ROXICODONE) IR tablet 5 mg  5 mg Oral Q4H PRN     Allergies have not been reviewed;  Not on File    Objective   Initial Vitals:   Temperature: 98.4 °F (36.9 °C) (08/24/24 1318)  Pulse: 61 (08/24/24 1318)  Respirations: 18 (08/24/24 1318)  Blood Pressure: 118/74 (08/24/24 1318)    Primary Survey:   Airway:        Status: patent;        Pre-hospital Interventions: none        Hospital Interventions: none  Breathing:        Pre-hospital Interventions: none       Effort: normal       Right breath sounds: normal       Left breath sounds: normal  Circulation:        Rhythm: regular       Rate: regular   Right Pulses Left Pulses    R radial: 2+  R femoral: 2+  R pedal: 2+     L radial: 2+  L femoral: 2+  L pedal: 2+       Disability:        GCS: Eye: 3;  Verbal: 5 Motor: 6 Total: 14       Right Pupil: round;  reactive         Left Pupil:  round;  reactive      R Motor Strength L Motor Strength    R : 5/5  R dorsiflex: 5/5  R plantarflex: 5/5 L : 5/5  L dorsiflex: 5/5  L plantarflex: 5/5        Sensory:  No sensory deficit  Exposure:       Completed: Yes      Secondary Survey:  Physical Exam  Vitals reviewed. Exam conducted with a chaperone present.   Constitutional:       General: She is not in acute distress.     Appearance: She is obese. She is not toxic-appearing.      Comments: Moderate discomfort   HENT:      Head: Normocephalic and atraumatic.      Right Ear: External ear normal.      Left Ear: External ear normal.      Nose: Nose normal.      Mouth/Throat:      Mouth: Mucous membranes are moist.      Pharynx: Oropharynx is clear.   Eyes:      Extraocular Movements: Extraocular movements intact.      Conjunctiva/sclera: Conjunctivae normal.      Pupils: Pupils are equal, round, and reactive to light.   Neck:      Comments: C-collar in place  Cardiovascular:      Rate and Rhythm: Normal rate. Rhythm irregular.      Pulses: Normal pulses.      Heart sounds: Normal heart sounds. No murmur heard.  Pulmonary:      Effort: Pulmonary effort is normal. No respiratory distress.      Breath sounds: Normal breath sounds. No stridor. No wheezing, rhonchi or rales.      Comments: Anterior chest tenderness  Chest:      Chest wall: Tenderness present.   Abdominal:      General: There is no distension.      Palpations: Abdomen is soft. There is no mass.      Tenderness: There is abdominal tenderness (L>R). There is guarding. There is no rebound.      Hernia: No hernia is present.       Genitourinary:     General: Normal vulva.      Rectum: Normal.      Comments: Rectal temp = 98.4  Musculoskeletal:         General: No swelling or tenderness.      Cervical back: No rigidity. Tenderness: midline tenderness.     Comments: C, T, and Sacral  spine tenderness. No L Spine  tenderness. No palpable stepoffs.    Lymphadenopathy:      Cervical: No cervical adenopathy.   Skin:     General: Skin is warm and dry.      Capillary Refill: Capillary refill takes less than 2 seconds.      Findings: Lesion (R knee abrasion) present.          Neurological:      Mental Status: She is lethargic and disoriented.      GCS: GCS eye subscore is 3. GCS verbal subscore is 5. GCS motor subscore is 6.      Sensory: No sensory deficit.      Motor: No weakness.      Comments: AAOx2 (self, time)   Psychiatric:         Speech: Speech normal.         Behavior: Behavior is cooperative.      Comments: Lethargic, attentive to exam with repetition         Invasive Devices       Peripheral Intravenous Line  Duration             Peripheral IV 08/24/24 Distal;Right;Upper;Ventral (anterior) Arm <1 day                  Lab Results: I have personally reviewed all pertinent laboratory/test results 08/24/24 and in the preceding 24 hours.  Recent Labs     08/24/24  1330   WBC 6.40   HGB 14.5   HCT 47.5*      SODIUM 137   K 5.3      CO2 23   BUN 19   CREATININE 2.37*   GLUC 368*   AST 49*   ALT 24   ALB 4.4   TBILI 0.41   ALKPHOS 70   PTT 31   INR 1.97*       Imaging Results: I have personally reviewed pertinent images saved in PACS. CT scan findings (and other pertinent positive findings on images) were discussed with radiology. My interpretation of the images/reports are as follows:  TRAUMA - CT head wo contrast   Final Result      No intracranial hemorrhage or calvarial fracture.      I personally discussed this study with YARA FLORES on 8/24/2024 2:21 PM.               Workstation performed: [id number]         TRAUMA - CT spine cervical wo contrast   Final Result      No cervical spine fracture or traumatic malalignment.      I personally discussed this study with YARA FLORES on 8/24/2024 2:21 PM.            Workstation performed: [id number]         CT chest abdomen pelvis wo contrast   Final Result       1.  Probable incomplete nondisplaced fractures of the anterolateral right 4th-7th ribs and left 3rd-5th ribs. Correlate for focal tenderness.   2.  Incidental discovery of one or more thyroid nodule(s) measuring more than 1.5 cm and without suspicious features is noted in this patient who is above 35 years old; according to guidelines published in the February 2015 white paper on incidental    thyroid nodules in the Journal of the American College of Radiology (JACR), further characterization with thyroid ultrasound is recommended.   3.  Hepatomegaly.      I personally discussed this study with YARA FLORES on 8/24/2024 2:21 PM.               Workstation performed: [id number]         XR Trauma multiple (SLB/[facility] trauma bay ONLY)   Final Result      No acute cardiopulmonary disease within limitations of supine imaging.         Workstation performed: [id number]         XR chest 1 view   Final Result      No acute cardiopulmonary disease within limitations of supine imaging.         Workstation performed: [id number]           Other Studies:   EKG (8/24/2024): atrial flutter, w/ Left-axis deviation    Code Status: Level 1 - Full Code  Advance Directive and Living Will:      Power of :    POLST:

## 2024-08-24 NOTE — TRAUMA DOCUMENTATION
No vitals recorded by transport monitor from 1329 to time of arrival to room at 1350. BP cuff unable to obtain values during CT scan

## 2024-08-24 NOTE — ASSESSMENT & PLAN NOTE
Patient found down in her kitchen.  Reports buying a Vicodin off the street-UDS positive for fentanyl  Educated on street drug risks, patient verbally confirmed understanding and states that that is the last time she will do that.  We also discussed, her chronic pain in her right shoulder and left hip that she was attempting to treat with this medication.  Will likely require pain management referral as an outpatient.  Currently treating with multimodal pain regimen--she understands that her current regimen is for her acute pain and this will not be a recurrent prescription after initial discharge.  APS consulted note appreciated

## 2024-08-24 NOTE — PROGRESS NOTES
Pastoral Care Progress Note    2024  Patient: Lupe Menjivar : 1965  Admission Date & Time: 2024 1312  MRN: [id number] CSN: [id number]           24 1300   Clinical Encounter Type   Visited With Patient and family together   Crisis Visit Trauma   Druze Encounters   Druze Needs Prayer     Responded to Trauma B for 58 yo female fall on thinners.  Spoke with  and provided hospitality food and drink in ED family waiting room.  Informed  medical staff or I would come to get him when patient could be seen.  Doctor said nurse will retrieve  when she is ready.  Remained with patient through CT scan, said silent prayer, then went to another referral.   services remain available.  YOHANNES, [address] @[address].

## 2024-08-24 NOTE — ED PROVIDER NOTES
Emergency Department Airway Evaluation and Management Form    History  Obtained from: EMS and Patient   Review of patient's allergies indicates no known allergies.    Chief Complaint:  Trauma Alert    HPI: Pt is a 59 y.o. female presents s/p Fall on thinners       I have reviewed and agree with the history as documented.      Physical Exam    There were no vitals filed for this visit.  Supplemental Oxygen:none    GCS: 15    Neuro: Alert and oriented  Psych: not combative, not anxious, cooperative for exam  Neck: In collar, No JVD, No midline tenderness  Cardio:  Normal  Respiratory: Normal  Mouth:  Normal  Pharynx: Normal    Monitor:  NSR      ED Medications    No current facility-administered medications for this encounter.  No current outpatient medications on file.      Intubation    No intubation required    Final Diagnosis:  Head injury  Fall       ED Provider  Electronically Signed by         Brien Recio DO  08/24/24 1342

## 2024-08-24 NOTE — ASSESSMENT & PLAN NOTE
Secondary to fall  CT imaging showed incomplete nondisplaced fractures of anterior lateral right 4-7 ribs and left 3-5 ribs  Rib fracture protocol  Multimodal pain regimen  APS consulted  Continue to encourage incentive spirometry use-currently inspiring 1.5 L  PT/OT  Follow-up with trauma as needed

## 2024-08-25 ENCOUNTER — APPOINTMENT (INPATIENT)
Dept: RADIOLOGY | Facility: HOSPITAL | Age: 59
DRG: 135 | End: 2024-08-25
Payer: COMMERCIAL

## 2024-08-25 PROBLEM — N18.9 ACUTE-ON-CHRONIC KIDNEY INJURY  (HCC): Status: ACTIVE | Noted: 2024-08-25

## 2024-08-25 PROBLEM — M25.561 RIGHT KNEE PAIN: Status: ACTIVE | Noted: 2024-08-25

## 2024-08-25 PROBLEM — E11.9 DIABETES MELLITUS, TYPE 2 (HCC): Status: ACTIVE | Noted: 2024-08-25

## 2024-08-25 PROBLEM — M25.562 LEFT KNEE PAIN: Status: ACTIVE | Noted: 2024-08-25

## 2024-08-25 PROBLEM — I50.9 HEART FAILURE (HCC): Status: ACTIVE | Noted: 2024-08-25

## 2024-08-25 PROBLEM — E11.9 DIABETES MELLITUS, TYPE 2 (HCC): Status: RESOLVED | Noted: 2024-08-25 | Resolved: 2024-08-25
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
## 2024-08-25 PROBLEM — N17.9 ACUTE-ON-CHRONIC KIDNEY INJURY  (HCC): Status: ACTIVE | Noted: 2024-08-25

## 2024-08-25 LAB
ABO GROUP BLD: NORMAL
ANION GAP SERPL CALCULATED.3IONS-SCNC: 10 MMOL/L (ref 4–13)
ANION GAP SERPL CALCULATED.3IONS-SCNC: 9 MMOL/L (ref 4–13)
APTT PPP: 33 SECONDS (ref 23–34)
ATRIAL RATE: 267 BPM
ATRIAL RATE: 277 BPM
BACTERIA UR QL AUTO: NORMAL /HPF
BASOPHILS # BLD AUTO: 0.01 THOUSANDS/ÂΜL (ref 0–0.1)
BASOPHILS NFR BLD AUTO: 0 % (ref 0–1)
BILIRUB UR QL STRIP: NEGATIVE
BUN SERPL-MCNC: 21 MG/DL (ref 5–25)
BUN SERPL-MCNC: 22 MG/DL (ref 5–25)
CALCIUM SERPL-MCNC: 8.5 MG/DL (ref 8.4–10.2)
CALCIUM SERPL-MCNC: 8.8 MG/DL (ref 8.4–10.2)
CHLORIDE SERPL-SCNC: 105 MMOL/L (ref 96–108)
CHLORIDE SERPL-SCNC: 106 MMOL/L (ref 96–108)
CLARITY UR: CLEAR
CO2 SERPL-SCNC: 23 MMOL/L (ref 21–32)
CO2 SERPL-SCNC: 24 MMOL/L (ref 21–32)
COLOR UR: COLORLESS
CREAT SERPL-MCNC: 2.36 MG/DL (ref 0.6–1.3)
CREAT SERPL-MCNC: 2.49 MG/DL (ref 0.6–1.3)
CREAT UR-MCNC: 34.1 MG/DL
EOSINOPHIL # BLD AUTO: 0.01 THOUSAND/ÂΜL (ref 0–0.61)
EOSINOPHIL NFR BLD AUTO: 0 % (ref 0–6)
ERYTHROCYTE [DISTWIDTH] IN BLOOD BY AUTOMATED COUNT: 16 % (ref 11.6–15.1)
GFR SERPL CREATININE-BSD FRML MDRD: 20 ML/MIN/1.73SQ M
GFR SERPL CREATININE-BSD FRML MDRD: 21 ML/MIN/1.73SQ M
GLUCOSE SERPL-MCNC: 104 MG/DL (ref 65–140)
GLUCOSE SERPL-MCNC: 74 MG/DL (ref 65–140)
GLUCOSE UR STRIP-MCNC: NEGATIVE MG/DL
HCT VFR BLD AUTO: 38.2 % (ref 34.8–46.1)
HGB BLD-MCNC: 11.8 G/DL (ref 11.5–15.4)
HGB UR QL STRIP.AUTO: NEGATIVE
IMM GRANULOCYTES # BLD AUTO: 0.03 THOUSAND/UL (ref 0–0.2)
IMM GRANULOCYTES NFR BLD AUTO: 1 % (ref 0–2)
KETONES UR STRIP-MCNC: NEGATIVE MG/DL
LEUKOCYTE ESTERASE UR QL STRIP: NEGATIVE
LYMPHOCYTES # BLD AUTO: 1.07 THOUSANDS/ÂΜL (ref 0.6–4.47)
LYMPHOCYTES NFR BLD AUTO: 18 % (ref 14–44)
MAGNESIUM SERPL-MCNC: 2.1 MG/DL (ref 1.9–2.7)
MCH RBC QN AUTO: 26.4 PG (ref 26.8–34.3)
MCHC RBC AUTO-ENTMCNC: 30.9 G/DL (ref 31.4–37.4)
MCV RBC AUTO: 86 FL (ref 82–98)
MONOCYTES # BLD AUTO: 0.47 THOUSAND/ÂΜL (ref 0.17–1.22)
MONOCYTES NFR BLD AUTO: 8 % (ref 4–12)
NEUTROPHILS # BLD AUTO: 4.26 THOUSANDS/ÂΜL (ref 1.85–7.62)
NEUTS SEG NFR BLD AUTO: 73 % (ref 43–75)
NITRITE UR QL STRIP: NEGATIVE
NON-SQ EPI CELLS URNS QL MICRO: NORMAL /HPF
NRBC BLD AUTO-RTO: 0 /100 WBCS
P AXIS: 180 DEGREES
P AXIS: 193 DEGREES
PH UR STRIP.AUTO: 5 [PH]
PHOSPHATE SERPL-MCNC: 3.9 MG/DL (ref 2.7–4.5)
PLATELET # BLD AUTO: 153 THOUSANDS/UL (ref 149–390)
PMV BLD AUTO: 11 FL (ref 8.9–12.7)
POTASSIUM SERPL-SCNC: 4.1 MMOL/L (ref 3.5–5.3)
POTASSIUM SERPL-SCNC: 4.2 MMOL/L (ref 3.5–5.3)
PROT UR STRIP-MCNC: NEGATIVE MG/DL
QRS AXIS: -81 DEGREES
QRS AXIS: -83 DEGREES
QRSD INTERVAL: 184 MS
QRSD INTERVAL: 202 MS
QT INTERVAL: 488 MS
QT INTERVAL: 514 MS
QTC INTERVAL: 491 MS
QTC INTERVAL: 573 MS
RBC # BLD AUTO: 4.47 MILLION/UL (ref 3.81–5.12)
RBC #/AREA URNS AUTO: NORMAL /HPF
RH BLD: POSITIVE
SODIUM SERPL-SCNC: 138 MMOL/L (ref 135–147)
SODIUM SERPL-SCNC: 139 MMOL/L (ref 135–147)
SP GR UR STRIP.AUTO: 1.01 (ref 1–1.03)
T WAVE AXIS: 88 DEGREES
T WAVE AXIS: 91 DEGREES
UROBILINOGEN UR STRIP-ACNC: <2 MG/DL
UUN 24H UR-MCNC: 132 MG/DL
VENTRICULAR RATE: 61 BPM
VENTRICULAR RATE: 75 BPM
WBC # BLD AUTO: 5.85 THOUSAND/UL (ref 4.31–10.16)
WBC #/AREA URNS AUTO: NORMAL /HPF

## 2024-08-25 PROCEDURE — 85025 COMPLETE CBC W/AUTO DIFF WBC: CPT

## 2024-08-25 PROCEDURE — 99232 SBSQ HOSP IP/OBS MODERATE 35: CPT | Performed by: NURSE PRACTITIONER

## 2024-08-25 PROCEDURE — 82570 ASSAY OF URINE CREATININE: CPT

## 2024-08-25 PROCEDURE — 84540 ASSAY OF URINE/UREA-N: CPT

## 2024-08-25 PROCEDURE — 84100 ASSAY OF PHOSPHORUS: CPT

## 2024-08-25 PROCEDURE — 73560 X-RAY EXAM OF KNEE 1 OR 2: CPT

## 2024-08-25 PROCEDURE — 94664 DEMO&/EVAL PT USE INHALER: CPT

## 2024-08-25 PROCEDURE — 80048 BASIC METABOLIC PNL TOTAL CA: CPT

## 2024-08-25 PROCEDURE — 94760 N-INVAS EAR/PLS OXIMETRY 1: CPT

## 2024-08-25 PROCEDURE — 85730 THROMBOPLASTIN TIME PARTIAL: CPT

## 2024-08-25 PROCEDURE — 97166 OT EVAL MOD COMPLEX 45 MIN: CPT

## 2024-08-25 PROCEDURE — 99255 IP/OBS CONSLTJ NEW/EST HI 80: CPT | Performed by: ANESTHESIOLOGY

## 2024-08-25 PROCEDURE — 81001 URINALYSIS AUTO W/SCOPE: CPT | Performed by: NURSE PRACTITIONER

## 2024-08-25 PROCEDURE — 93010 ELECTROCARDIOGRAM REPORT: CPT | Performed by: INTERNAL MEDICINE

## 2024-08-25 PROCEDURE — 97162 PT EVAL MOD COMPLEX 30 MIN: CPT

## 2024-08-25 PROCEDURE — 83735 ASSAY OF MAGNESIUM: CPT

## 2024-08-25 RX ORDER — BUMETANIDE 2 MG/1
2 TABLET ORAL ONCE
Status: COMPLETED | OUTPATIENT
Start: 2024-08-25 | End: 2024-08-25

## 2024-08-25 RX ADMIN — OXYCODONE HYDROCHLORIDE 5 MG: 5 TABLET ORAL at 12:41

## 2024-08-25 RX ADMIN — GABAPENTIN 300 MG: 300 CAPSULE ORAL at 07:51

## 2024-08-25 RX ADMIN — METOPROLOL SUCCINATE 50 MG: 50 TABLET, EXTENDED RELEASE ORAL at 10:43

## 2024-08-25 RX ADMIN — RIVAROXABAN 15 MG: 15 TABLET, FILM COATED ORAL at 17:03

## 2024-08-25 RX ADMIN — BUMETANIDE 2 MG: 2 TABLET ORAL at 10:41

## 2024-08-25 RX ADMIN — HYDROMORPHONE HYDROCHLORIDE 0.2 MG: 0.2 INJECTION, SOLUTION INTRAMUSCULAR; INTRAVENOUS; SUBCUTANEOUS at 10:52

## 2024-08-25 RX ADMIN — METHOCARBAMOL 750 MG: 750 TABLET ORAL at 05:15

## 2024-08-25 RX ADMIN — AMLODIPINE BESYLATE 5 MG: 5 TABLET ORAL at 20:10

## 2024-08-25 RX ADMIN — HYDROMORPHONE HYDROCHLORIDE 0.2 MG: 0.2 INJECTION, SOLUTION INTRAMUSCULAR; INTRAVENOUS; SUBCUTANEOUS at 20:09

## 2024-08-25 RX ADMIN — OXYCODONE HYDROCHLORIDE 5 MG: 5 TABLET ORAL at 07:55

## 2024-08-25 RX ADMIN — GABAPENTIN 300 MG: 300 CAPSULE ORAL at 23:32

## 2024-08-25 RX ADMIN — OXYCODONE HYDROCHLORIDE 5 MG: 5 TABLET ORAL at 16:37

## 2024-08-25 RX ADMIN — METHOCARBAMOL 750 MG: 750 TABLET ORAL at 17:03

## 2024-08-25 RX ADMIN — OXYCODONE HYDROCHLORIDE 5 MG: 5 TABLET ORAL at 03:19

## 2024-08-25 RX ADMIN — SODIUM CHLORIDE, SODIUM GLUCONATE, SODIUM ACETATE, POTASSIUM CHLORIDE, MAGNESIUM CHLORIDE, SODIUM PHOSPHATE, DIBASIC, AND POTASSIUM PHOSPHATE 75 ML/HR: .53; .5; .37; .037; .03; .012; .00082 INJECTION, SOLUTION INTRAVENOUS at 07:50

## 2024-08-25 RX ADMIN — PANTOPRAZOLE SODIUM 40 MG: 40 TABLET, DELAYED RELEASE ORAL at 05:15

## 2024-08-25 RX ADMIN — METHOCARBAMOL 750 MG: 750 TABLET ORAL at 12:41

## 2024-08-25 RX ADMIN — GABAPENTIN 300 MG: 300 CAPSULE ORAL at 17:03

## 2024-08-25 NOTE — OCCUPATIONAL THERAPY NOTE
Occupational Therapy Evaluation     Patient Name: Lupe Menjivar  Today's Date: 8/25/2024  Problem List  Principal Problem:    Multiple fractures of ribs, bilateral, initial encounter for closed fracture  Active Problems:    Opiate or related narcotic overdose (HCC)    Atrial flutter by electrocardiogram (HCC)    Essential hypertension    Right knee pain    Fall    Heart failure (HCC)    Acute-on-chronic kidney injury  (HCC)    Past Medical History  No past medical history on file.  Past Surgical History  No past surgical history on file.      08/25/24 1004   OT Last Visit   OT Visit Date 08/25/24   Note Type   Note type Evaluation   Pain Assessment   Pain Assessment Tool 0-10   Pain Score 4   Pain Location/Orientation Orientation: Bilateral;Location: Rib Cage   Patient's Stated Pain Goal No pain   Hospital Pain Intervention(s) Repositioned;Ambulation/increased activity;Emotional support   Restrictions/Precautions   Weight Bearing Precautions Per Order No   Other Precautions Multiple lines;Fall Risk;Pain   Home Living   Type of Home Apartment   Home Layout One level;Elevator;Able to live on main level with bedroom/bathroom   Bathroom Shower/Tub Tub/shower unit   Bathroom Toilet Standard   Bathroom Equipment Grab bars in shower   Bathroom Accessibility Accessible   Home Equipment Walker;Cane;Wheelchair-manual   Additional Comments PT REPORTS OCCASIONAL USE OF SPC   Prior Function   Level of [place] Independent with ADLs;Independent with functional mobility;Independent with IADLS   Lives With Spouse   Receives Help From Family   IADLs Independent with driving;Independent with meal prep;Independent with medication management   Falls in the last 6 months 1 to 4  (1)   Vocational On disability   Lifestyle   Autonomy PT REPORTS BEING I WITH ADLS/IADLS/DRIVING PTA.   Reciprocal Relationships LIVES WITH SUPPORTIVE SPOUSE.   Service to Others ON DISABILITY- WORKED IN A DRY    Intrinsic Gratification ENJOYS  READING   ADL   Eating Assistance 7  Independent   Grooming Assistance 7  Independent   UB Bathing Assistance 5  Supervision/Setup   LB Bathing Assistance 5  Supervision/Setup   UB Dressing Assistance 5  Supervision/Setup   LB Dressing Assistance 5  Supervision/Setup   Toileting Assistance  5  Supervision/Setup   Functional Assistance 5  Supervision/Setup   Bed Mobility   Supine to Sit 5  Supervision   Additional items Increased time required   Sit to Supine Unable to assess   Additional Comments PT LEFT OOB WITH ALL NEEDS IN REACH   Transfers   Sit to Stand 5  Supervision   Stand to Sit 5  Supervision   Functional Mobility   Functional Mobility 5  Supervision   Balance   Static Sitting Normal   Static Standing Fair +   Ambulatory Fair   Activity Tolerance   Activity Tolerance Patient tolerated treatment well   Medical Staff Made Aware PT SEEN FOR CO-EVAL WITH SKILLED PHYSICAL THERAPIST 2' POLY-TRAUAMTIC INJURIES, NEW PRECAUTIONS/LIMITATIONS, AND LIMITED ACTIVITY TOLERANCE WHICH IMPACT PERFORMANCE AND ARE A REGRESSION FROM PT'S BASELINE.   Nurse Made Aware APPROPRIATE TO SEE PER RN.   RUE Assessment   RUE Assessment WFL   LUE Assessment   LUE Assessment WFL   Hand Function   Gross Motor Coordination Functional   Fine Motor Coordination Functional   Psychosocial   Psychosocial (WDL) WDL   Cognition   Overall Cognitive Status WFL   Arousal/Participation Alert;Cooperative   Attention Within functional limits   Orientation Level Oriented X4   Memory Within functional limits   Following Commands Follows all commands and directions without difficulty   Comments PT IS PLEASANT AND COOPERATIVE.   Assessment   Assessment 58 YO Female SEEN FOR INITIAL OCCUPATIONAL THERAPY EVALUATION FOLLOWING ADMISSION TO [facility] AFTER BEING FOUND DOWN FOR UNKNOWN AMOUNT OF TIME. INITIALLY UNRESPONSIVE, WHICH IMPROVED WITH ADMINISTRATION OF NARCAN. DX INCLUDES B/L RIB FXS. PROBLEMS LIST/PMH INCLUDES ACUTE ENCEPHALOPATHY, CAD,  A-FIB AND CHRONIC PAIN WITH USE OF NARCOTICS. PT IS FROM AN APT WITH SPOUSE WHERE SHE REPORTS BEING INDEPENDENT WITH ADLS/IADLS/DRIVING PTA. PT CURRENTLY REQUIRES OVERALL S WITH ADLS, TRANSFERS AND FUNCTIONAL MOBILITY. PT IS EXPERIENCING EXPECTED LIMITATIONS 2' PAIN, FATIGUE, IMPAIRED BALANCE, and OVERALL LIMITED ACTIVITY TOLERANCE. PT EDUCATED ON DEEP BREATHING TECHNIQUES T/O ACTIVITY, SLOWING OF PACE, ENERGY CONSERVATION TECHNIQUES FOR CARRY OVER UPON D/C, INCREASED FAMILY SUPPORT, and CONTINUE PARTICIPATION IN SELF-CARE/MOBILITY WITH STAFF WHILE IN THE HOSPITAL . The patient's raw score on the -PAC Daily Activity Inpatient Short Form is 20. A raw score of greater than or equal to 19 suggests the patient may benefit from discharge to home. Please refer to the recommendation of the Occupational Therapist for safe discharge planning. FROM AN OCCUPATIONAL THERAPY PERSPECTIVE, PT CAN RETURN HOME WITH INCREASED FAMILY SUPPORT WHEN MEDICALLY CLEARED. DME REC INCLUDES SC. ALL CURRENT QUESTIONS/CONCERNS ADDRESSED. NO ADDITIONAL ACUTE CARE OT NEEDS. D/C OT.   Goals   Patient Goals TO RETURN HOME   Discharge Recommendation   Rehab Resource Intensity Level, OT No post-acute rehabilitation needs   Equipment Recommended Shower/Tub chair with back ($)   AM-PAC Daily Activity Inpatient   Lower Body Dressing 3   Bathing 3   Toileting 3   Upper Body Dressing 3   Grooming 4   Eating 4   Daily Activity Raw Score 20   Daily Activity Standardized Score (Calc for Raw Score >=11) 42.03   AM-PAC Applied Cognition Inpatient   Following a Speech/Presentation 4   Understanding Ordinary Conversation 4   Taking Medications 4   Remembering Where Things Are Placed or Put Away 4   Remembering List of 4-5 Errands 4   Taking Care of Complicated Tasks 4   Applied Cognition Raw Score 24   Applied Cognition Standardized Score 62.21       Documentation completed by NEELIMA Tsai, OTR/L  MOCA Certified ID# [id number]

## 2024-08-25 NOTE — ASSESSMENT & PLAN NOTE
Wt Readings from Last 3 Encounters:   No data found for Wt   Most recent echocardiogram showed: Echocardiogram (6/14/2024): Left ventricular ejection fraction 60%.  Wall motion is normal.  Grade 2 diastolic dysfunction.  No significant valvular disease.  Left atrial dilation 35 to 41 mL/M2)   Continue on home metoprolol  In the setting of DANIELA with chronic diuretic use will trial Bumex to see response

## 2024-08-25 NOTE — RESPIRATORY THERAPY NOTE
RT Protocol Note  Lupe Menjivar 59 y.o. female MRN: [id number]  Unit/Bed#: [facility] 615-01 Encounter: [id number]    Assessment    Principal Problem:    Multiple fractures of ribs, bilateral, initial encounter for closed fracture  Active Problems:    Opiate or related narcotic overdose (HCC)    Atrial flutter by electrocardiogram (HCC)    Essential hypertension      Home Pulmonary Medications:  none       No past medical history on file.  Social History     Socioeconomic History    Marital status: /Civil Union     Spouse name: Not on file    Number of children: Not on file    Years of education: Not on file    Highest education level: Not on file   Occupational History    Not on file   Tobacco Use    Smoking status: Every Day     Types: Cigarettes     Start date: 1994    Smokeless tobacco: Not on file   Substance and Sexual Activity    Alcohol use: Not Currently    Drug use: Not on file    Sexual activity: Not on file   Other Topics Concern    Not on file   Social History Narrative    Not on file     Social Determinants of Health     Financial Resource Strain: Not on file   Food Insecurity: Not on file   Transportation Needs: Not on file   Physical Activity: Not on file   Stress: Not on file   Social Connections: Not on file   Intimate Partner Violence: Not on file   Housing Stability: Not on file       Subjective         Objective    Physical Exam:   Assessment Type: Assess only  General Appearance: Sleeping  Respiratory Pattern: Normal  Chest Assessment: Chest expansion symmetrical  Bilateral Breath Sounds: Clear, Diminished  Cough: None    Vitals:  Blood pressure 108/67, pulse 60, temperature 98.5 °F (36.9 °C), resp. rate 16, SpO2 92%.          Imaging and other studies:           Plan       Airway Clearance Plan: Incentive Spirometer     Resp Comments: (P) Pt. presents as a level B trauma due to a fall resulting in bilateral rib fractures. Pt. does not have any pulm hx/meds. BBS clear/diminished. SPO2 95%  on room air, RR 18, Pt. without any increased WOB. CXR revealed bilateral non displaced rib fractures. Will begin IS for rib fracture protocol.

## 2024-08-25 NOTE — PROCEDURES
POC FAST US    Date/Time: 8/24/2024 1:19 PM    Performed by: Andi Song MD  Authorized by: Estefania Banerjee MD    Patient location:  Trauma  Procedure details:     Exam Type:  Diagnostic    Indications: blunt abdominal trauma and blunt chest trauma      Assess for:  Intra-abdominal fluid and pericardial effusion    Technique: FAST      Views obtained:  Heart - Pericardial sac, LUQ - Splenorenal space, Suprapubic - Pouch of Saeed and RUQ - Banegas's Pouch    Image quality: non-diagnostic      Image availability:  Images available in PACS and video obtained  FAST Findings:     RUQ (Hepatorenal) free fluid: absent      LUQ (Splenorenal) free fluid: indeterminate      Suprapubic free fluid: indeterminate      Cardiac wall motion: not identified      Pericardial effusion: indeterminate    Interpretation:     Impressions: indeterminate

## 2024-08-25 NOTE — PROGRESS NOTES
[facility]  Progress Note  Name: Lupe Menjivar I  MRN: [id number]  Unit/Bed#: [facility]PHP 615-01 I Date of Admission: 8/24/2024   Date of Service: 8/25/2024 I Hospital Day: 1    Assessment & Plan   Fall  Assessment & Plan  Injuries listed below  Associated with street drug use  PT/OT    * Multiple fractures of ribs, bilateral, initial encounter for closed fracture  Assessment & Plan  Secondary to fall  CT imaging showed incomplete nondisplaced fractures of anterior lateral right 4-7 ribs and left 3-5 ribs  Rib fracture protocol  Multimodal pain regimen  APS consulted  Continue to encourage incentive spirometry use-currently inspiring 1.5 L  PT/OT  Follow-up with trauma as needed      Opiate or related narcotic overdose (HCC)  Assessment & Plan  Patient found down in her kitchen.  Reports buying a Vicodin off the street-UDS positive for fentanyl  Educated on street drug risks, patient verbally confirmed understanding and states that that is the last time she will do that.  We also discussed, her chronic pain in her right shoulder and left hip that she was attempting to treat with this medication.  Will likely require pain management referral as an outpatient.  Currently treating with multimodal pain regimen--she understands that her current regimen is for her acute pain and this will not be a recurrent prescription after initial discharge.  APS consulted note appreciated    Acute-on-chronic kidney injury  (HCC)  Assessment & Plan  Lab Results   Component Value Date    EGFR 20 08/25/2024    EGFR 21 08/24/2024    CREATININE 2.49 (H) 08/25/2024    CREATININE 2.37 (H) 08/24/2024   Patient has chronic kidney disease stage IV-baseline appears to be 1.7  On arrival creatinine was 2.37-chetan to 2.49 despite receiving IVF  Chronically on diuretics-will challenge patient with dose of Bumex today-continue to closely monitor I&O and kidney functioning    Heart failure (HCC)  Assessment &  "Plan  Wt Readings from Last 3 Encounters:   No data found for Wt   Most recent echocardiogram showed: Echocardiogram (6/14/2024): Left ventricular ejection fraction 60%.  Wall motion is normal.  Grade 2 diastolic dysfunction.  No significant valvular disease.  Left atrial dilation 35 to 41 mL/M2)   Continue on home metoprolol  In the setting of DANIELA with chronic diuretic use will trial Bumex to see response            Right knee pain  Assessment & Plan  Patient complains of new right knee pain  XR pending  Analgesia as needed  PT/OT    Essential hypertension  Assessment & Plan  Continue home amlodipine and metoprolol  Continue to monitor blood pressure  Follow-up with PCP as an outpatient    Atrial flutter by electrocardiogram (HCC)  Overview  - setting of chronic A.fib/flutter  - s/p ICD pacemaker    Assessment & Plan  Continue home metoprolol and Xarelto    Diabetes mellitus, type 2 ([facility])-resolved as of 8/25/2024  Assessment & Plan  No results found for: \"HGBA1C\"    No results for input(s): \"POCGLU\" in the last 72 hours.    Blood Sugar Average: Last 72 hrs:                 TRAUMA TERTIARY SURVEY NOTE    VTE Prophylaxis:Sequential compression device (Venodyne) Xarelto    Disposition: Pending PT/OT evaluation    Code status:  Level 1 - Full Code    Consultants: IP CONSULT TO ACUTE PAIN SERVICE    Subjective     Mechanism of Injury:Fall     Chief Complaint: \"My knee hurts to\"    HPI/Last 24 hour events: This is a 59-year-old female who admits to buying what she believed was a bite given off the street.  We discussed that her UA was positive for fentanyl.  She understands the high risk associated with using street drugs.  She states \"I am never going to do that again\".    She reports having chronic pain in her right shoulder and left hip that she was previously on oxycodone for and has been weaned off but she continues to have pain.  She is open to the idea of going to pain management in the future.  Currently she " "complains of rib pain and pain with deep breathing/movement.  She also reports right knee pain but believes it is \"likely bruised\".  She is able to move it and has been up and ambulatory.  No other complaints offered.     Objective   Vitals:   Temp:  [98.2 °F (36.8 °C)-98.8 °F (37.1 °C)] 98.2 °F (36.8 °C)  HR:  [58-69] 61  Resp:  [16-18] 18  BP: (104-161)/(57-78) 112/69    I/O         08/23 0701  08/24 0700 08/24 0701  08/25 0700 08/25 0701  08/26 0700    P.O.  780     I.V.  700     Total Intake  1480     Urine  1     Total Output  1     Net  +1479            Unmeasured Urine Occurrence  2 x     Unmeasured Emesis Occurrence  1 x              Physical Exam:   GENERAL APPEARANCE: No acute distress  NEURO: GCS 15  HEENT: Normocephalic, atraumatic.  Neck supple.  CV: Irregularly irregular.  +2 radial and dorsalis pedis pulses, bilaterally.  LUNGS: Clear to auscultation, bilaterally.  Chest wall is tender on the anterior portions bilaterally.  No orthopnea.  No tachypnea.  Patient is on room air saturating 96%.  GI: Abdomen is soft nontender.  : Pelvis is stable.  MSK: Right knee generally tender with small effusion and overlying abrasion.  Patient has slightly decreased range of motion of right knee.  All other extremities display no deformities and are nontender.  SKIN: Warm, dry.    Invasive Devices       Peripheral Intravenous Line  Duration             Peripheral IV 08/24/24 Dorsal (posterior);Left Forearm <1 day                          PIC Score  PIC Pain Score: 1 (8/25/2024 10:52 AM)  PIC Incentive Spirometry Score: 4 (8/25/2024  2:00 AM)  PIC Cough Description: 3 (8/25/2024  2:00 AM)  PIC Total Score: 10 (8/25/2024  2:00 AM)       If the Total PIC Score </=5, did you consult APS and evaluate patient for further intervention?: yes      Pain:    Incentive Spirometry  Cough  3 = Controlled  4 = Above goal volume 3 = Strong  2 = Moderate  3 = Goal to alert volume 2 = Weak  1 = Severe  2 = Below alert volume 1 = " Absent     1 = Unable to perform IS      Lab Results: Results: I have personally reviewed all pertinent laboratory/tests results, BMP/CMP:   Lab Results   Component Value Date    SODIUM 138 08/25/2024    K 4.2 08/25/2024     08/25/2024    CO2 23 08/25/2024    BUN 22 08/25/2024    CREATININE 2.49 (H) 08/25/2024    CALCIUM 8.5 08/25/2024    AST 49 (H) 08/24/2024    ALT 24 08/24/2024    ALKPHOS 70 08/24/2024    EGFR 20 08/25/2024   , and CBC:   Lab Results   Component Value Date    WBC 5.85 08/25/2024    HGB 11.8 08/25/2024    HCT 38.2 08/25/2024    MCV 86 08/25/2024     08/25/2024    RBC 4.47 08/25/2024    MCH 26.4 (L) 08/25/2024    MCHC 30.9 (L) 08/25/2024    RDW 16.0 (H) 08/25/2024    MPV 11.0 08/25/2024    NRBC 0 08/25/2024       Imaging Results: I have personally reviewed pertinent reports.    Chest Xray(s): See Below   FAST exam(s): N/A   CT Scan(s): See below   Additional Xray(s): pending     Other Studies:   TRAUMA - CT head wo contrast    Result Date: 8/24/2024  Impression: No intracranial hemorrhage or calvarial fracture. I personally discussed this study with YARA FLORES on 8/24/2024 2:21 PM. Workstation performed: [id number]     XR Trauma multiple (SLB/SLRA trauma bay ONLY)    Result Date: 8/24/2024  Impression: No acute cardiopulmonary disease within limitations of supine imaging. Workstation performed: [id number]     XR chest 1 view    Result Date: 8/24/2024  Impression: No acute cardiopulmonary disease within limitations of supine imaging. Workstation performed: [id number]     TRAUMA - CT spine cervical wo contrast    Result Date: 8/24/2024  Impression: No cervical spine fracture or traumatic malalignment. I personally discussed this study with YARA FLORES on 8/24/2024 2:21 PM. Workstation performed: [id number]     CT chest abdomen pelvis wo contrast    Result Date: 8/24/2024  Impression: 1.  Probable incomplete nondisplaced fractures of the anterolateral right 4th-7th ribs and left  3rd-5th ribs. Correlate for focal tenderness. 2.  Incidental discovery of one or more thyroid nodule(s) measuring more than 1.5 cm and without suspicious features is noted in this patient who is above 35 years old; according to guidelines published in the February 2015 white paper on incidental thyroid nodules in the Journal of the American College of Radiology (JACR), further characterization with thyroid ultrasound is recommended. 3.  Hepatomegaly. I personally discussed this study with YARA FLORES on 8/24/2024 2:21 PM. Workstation performed: [id number]

## 2024-08-25 NOTE — UTILIZATION REVIEW
Initial Clinical Review    Admission: Date/Time/Statement:   Admission Orders (From admission, onward)       Ordered        08/24/24 1435  Inpatient Admission  Once                          Orders Placed This Encounter   Procedures    Inpatient Admission     Standing Status:   Standing     Number of Occurrences:   1     Order Specific Question:   Level of Care     Answer:   Med Surg [16]     Order Specific Question:   Estimated length of stay     Answer:   Not Applicable     ED Arrival Information       Expected   -    Arrival   8/24/2024 13:12    Acuity   Emergent              Means of arrival   Ambulance    Escorted by   [facility]    Service   Trauma    Admission type   Emergency              Arrival complaint   fall                 Initial Presentation: 59 y.o. female with PMHx includes CAD & Afib (s/p pacemaker) that she takes Xarelto, chronic pain on narcotics, who presented on 8/24 to [facility] ED via EMS as level B trauma alert s/p presumed fall, found down by  for unknown amount of time. Upon EMS arrival, minimally responsive with snoring respirations. Intranasal narcan was administered and patient became more responsive. Patient arrived into trauma bay without C-collar in place, complaining of neck, chest, abdominal, and mid-back pain. On exam, lethargic and disoriented GCS 14, chest wall tenderness noted, abdominal tenderness and guarding, right knee abrasion present.  Imaging revealed BL multiple rib fxs.    Plan:  Admit Inpatient status to med surg dt Presumed Fall, Overdose: APS consult for pain control, neuro checks, regular diet, inc spirometry, PT OT eval, COWS, IO and daily wts.     Date: 8/25   Day 2: UA positive for fentanyl. C/o rib pain with deep breathing and movement, right knee pain. Continue to encourage inc spirometry, PT OT, APS following, monitor VS and labs.     8/25 Per Acute Pain Services: Clinically doing well and would not place an epidural given good  respiratory status regardless of her anticoagulation status.Continue gabapentin 300mg, robaxin 750mg q6h, oxycodone 2.5-5mg q4h PRN for moderate-severe pain, IV dilaudid 0.2mg q2h PRN for breakthrough pain. Patient admits to buying what she believed was Vicodin off the street to treat her pain.    ED Triage Vitals   Temperature Pulse Respirations Blood Pressure SpO2 Pain Score   08/24/24 1318 08/24/24 1318 08/24/24 1318 08/24/24 1318 08/24/24 1318 08/24/24 1445   98.4 °F (36.9 °C) 61 18 118/74 97 % 3     Weight (last 2 days)       None            Vital Signs (last 3 days)       Date/Time Temp Pulse Resp BP MAP (mmHg) SpO2 O2 Device Patient Position - Orthostatic VS [place] Coma Scale Score Clinical Opiate Withdrawal Scale Total Score Pain    08/25/24 1052 -- -- -- -- -- -- -- -- -- -- 10 - Worst Possible Pain    08/25/24 1040 -- 61 -- 112/69 83 94 % -- -- -- -- --    08/25/24 1004 -- -- -- -- -- -- -- -- -- -- 4    08/25/24 1003 -- -- -- -- -- -- -- -- -- -- 4    08/25/24 0756 -- -- -- -- -- 94 % None (Room air) -- -- -- --    08/25/24 0755 -- -- -- -- -- -- -- -- -- -- 8 08/25/24 07:54:32 98.2 °F (36.8 °C) 61 18 109/69 82 94 % -- -- -- -- --    08/25/24 0400 -- -- -- -- -- -- -- -- 15 -- --    08/25/24 0319 -- -- -- -- -- -- -- -- -- -- 7    08/25/24 02:47:36 98.2 °F (36.8 °C) 62 18 109/68 82 93 % -- -- -- -- --    08/25/24 0200 -- -- -- -- -- -- -- -- -- -- No Pain    08/25/24 0000 -- -- -- -- -- -- -- -- 15 -- --    08/24/24 2330 -- -- -- -- -- -- -- -- -- -- 6    08/24/24 2313 -- -- -- -- -- -- -- -- -- -- 9    08/24/24 22:35:23 98.5 °F (36.9 °C) 60 16 108/67 81 92 % -- -- -- -- --    08/24/24 21:29:39 98.8 °F (37.1 °C) 58 -- 108/67 81 94 % -- -- -- -- --    08/24/24 2035 -- -- -- -- -- -- None (Room air) -- 15 -- No Pain    08/24/24 2000 -- -- -- -- -- -- -- -- -- 0 --    08/24/24 19:18:23 98.3 °F (36.8 °C) 61 18 124/75 91 93 % None (Room air) Lying -- -- --    08/24/24 1847 -- -- -- -- -- -- -- -- 15 -- 10  - Worst Possible Pain    08/24/24 1603 -- 69 18 120/71 91 91 % None (Room air) -- -- -- --    08/24/24 1515 -- 62 18 113/68 86 94 % None (Room air) -- -- -- --    08/24/24 1445 -- 60 18 104/75 86 92 % None (Room air) Lying -- -- 3    08/24/24 1435 -- 58 -- -- -- 92 % -- -- -- -- --    08/24/24 1430 -- 58 18 111/60 -- 91 % None (Room air) -- 15 -- --    08/24/24 1425 -- 58 -- -- -- 91 % -- -- -- -- --    08/24/24 1420 -- 58 -- -- -- 91 % -- -- -- -- --    08/24/24 14:15:14 -- -- -- 117/60 -- -- -- -- -- -- --    08/24/24 1415 -- 58 -- -- -- 91 % -- -- -- -- --    08/24/24 1410 -- 58 -- -- -- 91 % -- -- -- -- --    08/24/24 1405 -- 58 -- -- -- 92 % -- -- -- -- --    08/24/24 1400 -- 60 18 117/57 -- 91 % None (Room air) -- 15 -- --    08/24/24 1355 -- 62 -- -- -- 97 % -- -- -- -- --    08/24/24 1353 -- 66 -- 161/78 -- 95 % None (Room air) -- 15 -- --    08/24/24 13:18:33 98.4 °F (36.9 °C) 61 18 118/74 -- 97 % None (Room air) -- 15 -- --              Pertinent Labs/Diagnostic Test Results:   Radiology:  TRAUMA - CT head wo contrast   Final Interpretation by Rome Mcgee MD (08/24 1423)      No intracranial hemorrhage or calvarial fracture.      I personally discussed this study with YARA FLORES on 8/24/2024 2:21 PM.               Workstation performed: [id number]         TRAUMA - CT spine cervical wo contrast   Final Interpretation by Rome Mcgee MD (08/24 1421)      No cervical spine fracture or traumatic malalignment.      I personally discussed this study with YARA GRIGSGIREZ on 8/24/2024 2:21 PM.            Workstation performed: [id number]         CT chest abdomen pelvis wo contrast   Final Interpretation by Rome Mcgee MD (08/24 1421)      1.  Probable incomplete nondisplaced fractures of the anterolateral right 4th-7th ribs and left 3rd-5th ribs. Correlate for focal tenderness.   2.  Incidental discovery of one or more thyroid nodule(s) measuring more than 1.5 cm and  without suspicious features is noted in this patient who is above 35 years old; according to guidelines published in the February 2015 white paper on incidental    thyroid nodules in the Journal of the American College of Radiology (JACR), further characterization with thyroid ultrasound is recommended.   3.  Hepatomegaly.      I personally discussed this study with YARA GRIGGSIREZ on 8/24/2024 2:21 PM.               Workstation performed: [id number]         XR Trauma multiple (SLB/SLRA trauma bay ONLY)   Final Interpretation by Rome Mcgee MD (08/24 1423)      No acute cardiopulmonary disease within limitations of supine imaging.         Workstation performed: [id number]         XR chest 1 view   Final Interpretation by Rome Mcgee MD (08/24 1423)      No acute cardiopulmonary disease within limitations of supine imaging.         Workstation performed: [id number]         XR knee 1 or 2 vw right    (Results Pending)     Cardiology:  ECG 12 lead    by Interface, Ris Results In (08/24 1400)      ECG 12 lead    by Interface, Ris Results In (08/24 1358)        GI:  No orders to display           Results from last 7 days   Lab Units 08/25/24  0508 08/24/24  1330   WBC Thousand/uL 5.85 6.40   HEMOGLOBIN g/dL 11.8 14.5   HEMATOCRIT % 38.2 47.5*   PLATELETS Thousands/uL 153 213   TOTAL NEUT ABS Thousands/µL 4.26 4.44         Results from last 7 days   Lab Units 08/25/24  0508 08/24/24  1330   SODIUM mmol/L 138 137   POTASSIUM mmol/L 4.2 5.3   CHLORIDE mmol/L 106 104   CO2 mmol/L 23 23   ANION GAP mmol/L 9 10   BUN mg/dL 22 19   CREATININE mg/dL 2.49* 2.37*   EGFR ml/min/1.73sq m 20 21   CALCIUM mg/dL 8.5 9.2   MAGNESIUM mg/dL 2.1  --    PHOSPHORUS mg/dL 3.9  --      Results from last 7 days   Lab Units 08/24/24  1330   AST U/L 49*   ALT U/L 24   ALK PHOS U/L 70   TOTAL PROTEIN g/dL 7.8   ALBUMIN g/dL 4.4   TOTAL BILIRUBIN mg/dL 0.41         Results from last 7 days   Lab Units 08/25/24  0508  08/24/24  1330   GLUCOSE RANDOM mg/dL 74 368*     Results from last 7 days   Lab Units 08/24/24  1330   CK TOTAL U/L 89     Results from last 7 days   Lab Units 08/25/24  0508 08/24/24  1330   PROTIME seconds  --  22.5*   INR   --  1.97*   PTT seconds 33 31     Results from last 7 days   Lab Units 08/24/24  1559   AMPH/METH  Negative   BARBITURATE UR  Negative   BENZODIAZEPINE UR  Negative   COCAINE UR  Negative   METHADONE URINE  Negative   OPIATE UR  Negative   PCP UR  Negative   THC UR  Negative     ED Treatment-Medication Administration from 08/24/2024 1310 to 08/24/2024 1815         Date/Time Order Dose Route Action     08/24/2024 1503 multi-electrolyte (PLASMALYTE-A/ISOLYTE-S PH 7.4) IV solution 75 mL/hr Intravenous New Bag     08/24/2024 1557 enoxaparin (LOVENOX) subcutaneous injection 30 mg 30 mg Subcutaneous Given     08/24/2024 1502 ondansetron (ZOFRAN) injection 4 mg 4 mg Intravenous Given     08/24/2024 1557 methocarbamol (ROBAXIN) tablet 750 mg 750 mg Oral Given     08/24/2024 1557 gabapentin (NEURONTIN) capsule 300 mg 300 mg Oral Given            No past medical history on file.  Present on Admission:   Multiple fractures of ribs, bilateral, initial encounter for closed fracture      Admitting Diagnosis: Multiple fractures of ribs, bilateral, initial encounter for closed fracture [S22.43XA]  Other injury of unspecified body region, initial encounter [T14.8XXA]  Age/Sex: 59 y.o. female  Admission Orders:  Scheduled Medications:  amLODIPine, 5 mg, Oral, BID  gabapentin, 300 mg, Oral, TID  methocarbamol, 750 mg, Oral, Q6H JEISON  metoprolol succinate, 50 mg, Oral, Daily  pantoprazole, 40 mg, Oral, Early Morning  rivaroxaban, 15 mg, Oral, Daily With Dinner      Continuous IV Infusions:  multi-electrolyte, 75 mL/hr, Intravenous, Continuous      PRN Meds:  HYDROmorphone, 0.2 mg, Intravenous, Q2H PRN x1  ondansetron, 4 mg, Intravenous, Q4H PRN x1  oxyCODONE, 2.5 mg, Oral, Q4H PRN   Or  oxyCODONE, 5 mg, Oral, Q4H  PRN x5        IP CONSULT TO ACUTE PAIN SERVICE    Network Utilization Review Department  ATTENTION: Please call with any questions or concerns to [phone] and carefully listen to the prompts so that you are directed to the right person. All voicemails are confidential.   For Discharge needs, contact Care Management DC Support Team at [phone] opt. 2  Send all requests for admission clinical reviews, approved or denied determinations and any other requests to dedicated fax number below belonging to the campus where the patient is receiving treatment. List of dedicated fax numbers for the Facilities:  FACILITY NAME UR FAX NUMBER   ADMISSION DENIALS (Administrative/Medical Necessity) [phone]   DISCHARGE SUPPORT TEAM (NETWORK) [phone]   PARENT CHILD HEALTH (Maternity/NICU/Pediatrics) [phone]   [facility] [phone]   [facility] [phone]   [facility] [phone]   [facility] [phone]   [facility] [phone]   [facility] [phone]   [facility] [phone]   [facility] [phone]   [facility] [phone]   [facility] [phone]   [facility] [phone]   [facility] [phone]

## 2024-08-25 NOTE — PHYSICAL THERAPY NOTE
Physical Therapy Evaluation     Patient's Name: Lupe Menjivar    Admitting Diagnosis  Multiple fractures of ribs, bilateral, initial encounter for closed fracture [S22.43XA]  Other injury of unspecified body region, initial encounter [T14.8XXA]    Problem List  Patient Active Problem List   Diagnosis    Multiple fractures of ribs, bilateral, initial encounter for closed fracture    Opiate or related narcotic overdose (HCC)    Atrial flutter by electrocardiogram (HCC)    Essential hypertension    Right knee pain    Fall    Heart failure (HCC)    Acute-on-chronic kidney injury  (HCC)       Past Medical History  No past medical history on file.    Past Surgical History  No past surgical history on file.       08/25/24 1003   PT Last Visit   PT Visit Date 08/25/24   Note Type   Note type Evaluation   Pain Assessment   Pain Assessment Tool 0-10   Pain Score 4   Pain Location/Orientation Location: Rib Cage   Hospital Pain Intervention(s) Ambulation/increased activity;Repositioned   Restrictions/Precautions   Weight Bearing Precautions Per Order No   Braces or Orthoses   (none)   Other Precautions Pain;Fall Risk;Multiple lines  (b/l rib frx)   Home Living   Type of Home Apartment   Home Layout One level  (0 rick)   Bathroom Shower/Tub Tub/shower unit   Bathroom Toilet Standard   Bathroom Equipment Grab bars in shower   Home Equipment Walker;Cane;Wheelchair-manual   Additional Comments Prior to this admission patient resided with her spouse in a one level apartment (0 RICK) where at her baseline she is I with mobility (no AD, SPC available) and ADLS   Prior Function   Level of [place] Independent with ADLs;Independent with functional mobility   Lives With Spouse   Receives Help From Family   Falls in the last 6 months 1 to 4  (1)   General   Additional Pertinent History 59 y.o. female admitted to [facility] on 8/24/2024 s/p fall; found down in kitchen for unknown amount of time. + Narcan.  Patient with  "h/o chronic pain for which she takes narcotics. Diagnosis includes incomplete non-displaced fractures of anterolateral right 4-7 wounds and left 3-5th ribs.   Family/Caregiver Present No   Cognition   Overall Cognitive Status WFL   Arousal/Participation Alert   Orientation Level Oriented X4   Subjective   Subjective \"I dont need to use the cane\"   RLE Assessment   RLE Assessment WFL   LLE Assessment   LLE Assessment WFL   Bed Mobility   Supine to Sit 5  Supervision   Additional items HOB elevated   Sit to Supine Unable to assess   Additional Comments post eval patient OOB in chair with all needs met. eduated on precautions for rib fractures.   Transfers   Sit to Stand 5  Supervision   Stand to Sit 5  Supervision   Ambulation/Elevation   Gait pattern Excessively slow   Gait Assistance 5  Supervision   Assistive Device None   Distance 50 feet x2   Ambulation/Elevation Additional Comments PT encouraged use of SPC for ambulation however patient declined and ambulated 50 feet x 2 w/o use of AD. Gait speed is reduced, no overt LOB.   Balance   Static Sitting Normal   Static Standing Fair +   Ambulatory Fair   Endurance Deficit   Endurance Deficit Yes   Endurance Deficit Description pain   Activity Tolerance   Activity Tolerance Patient tolerated treatment well   Medical Staff Made Aware This moderate complexity evaluation was performed with an occupational therapist due to the patient's co-morbidities, emerging presentation, and present impairments.   Nurse Made Aware al to see per RN Emely   Assessment   Prognosis Good   Problem List Decreased endurance;Pain   Assessment PT completed evaluation of 59 y.o. female admitted to [facility] on 8/24/2024 s/p fall; found down in kitchen for unknown amount of time. + Narcan.  Patient with h/o chronic pain for which she takes narcotics. Diagnosis includes incomplete non-displaced fractures of anterolateral right 4-7 wounds and left 3-5th ribs. Patient's current " emerging status includes ongoing pain, continuous O2/HR monitoring, IV, falls risk, and a regression in function from baseline.  PMH is significant for chronic pain. Prior to this admission patient resided with her spouse in a one level apartment (0 RICK) where at her baseline she is I with mobility (no AD, SPC available) and ADLS. Patient presents at time of PT evaluation functioning below baseline and currently w/ overall mobility deficits 2* to: impaired balance, gait deviations, decreased activity tolerance and fall risk.  During PT evaluation, patient currently is requiring supervision with increased time for transfers and ambulation. PT encouraged use of SPC for ambulation however patient declined and ambulated 50 feet x 2 w/o use of AD. Gait speed is reduced, no overt LOB. PT d/c recommendation is for return home. She presents without skilled inpt PT needs at this time or f/u PT needs at time of d/c. D/C inpt PT.   Goals   Patient Goals to go home   Plan   PT Frequency Other (Comment)  (d/c inpt PT)   Discharge Recommendation   Rehab Resource Intensity Level, PT No post-acute rehabilitation needs   AM-PAC Basic Mobility Inpatient   Turning in Flat Bed Without Bedrails 4   Lying on Back to Sitting on Edge of Flat Bed Without Bedrails 4   Moving Bed to Chair 4   Standing Up From Chair Using Arms 4   Walk in Room 4   Climb 3-5 Stairs With Railing 4   Basic Mobility Inpatient Raw Score 24   Basic Mobility Standardized Score 57.68   [facility] Highest Level Of Mobility   [facility] Goal 8: Walk 250 feet or more     The patient's AM-PAC Basic Mobility Inpatient Standardized Score is greater than 42.9, suggesting this patient may benefit from discharge to home. Please also refer to the recommendation of the Physical Therapist for safe discharge planning.        Huong Rodriguez, PT, DPT

## 2024-08-25 NOTE — ASSESSMENT & PLAN NOTE
Patient with multiple rib fractures 2/2 fall, on Xarelto with last dose 8/24. Clinically doing well and would not place an epidural given good respiratory status regardless of her anticoagulation status.    - Continue gabapentin 300mg q8h  - Continue robaxin 750mg q6h  - Continue oxycodone 2.5-5mg q4h PRN for moderate-severe pain  - Continue IV dilaudid 0.2mg q2h PRN for breakthrough pain    Patient admits to buying what she believed was Vicodin off the street to treat her pain

## 2024-08-25 NOTE — ASSESSMENT & PLAN NOTE
Lab Results   Component Value Date    EGFR 20 08/25/2024    EGFR 21 08/24/2024    CREATININE 2.49 (H) 08/25/2024    CREATININE 2.37 (H) 08/24/2024   Patient has chronic kidney disease stage IV-baseline appears to be 1.7  On arrival creatinine was 2.37-chetan to 2.49 despite receiving IVF  Chronically on diuretics-will challenge patient with dose of Bumex today-continue to closely monitor I&O and kidney functioning

## 2024-08-25 NOTE — CONSULTS
Progress Note - Acute Pain Service    Lupe Menjivar 59 y.o. female MRN: [id number]  Unit/Bed#: [facility] 615-01 Encounter: [id number]      Lupe Menjivar is a 59 y.o. female presents as a level B alert status post fall. Patient has a history of chronic pain for which she takes narcotics and was unresponsive in the field, which improved after intranasal Narcan. She was complaining of diffuse pain. CT head, C-spine, chest/abdomen/pelvis images reviewed and discussed with radiology; significant for probable incomplete nondisplaced fractures of the anterolateral right 4th through 7th ribs and left 3rd through 5th ribs.     Patient seen resting in bed this morning talking on the phone. She states her pain is moderate but tolerable on her current regimen. She is not on supplemental oxygen and can hit >1500ml on incentive spirometry. She takes Xarelto with last dose 8/24. Given her good clinical picture and anticoagulation use she is not a candidate for epidural analgesia. She is doing well in terms of pain control and is asking when she can be discharged.    * Multiple fractures of ribs, bilateral, initial encounter for closed fracture  Assessment & Plan  Patient with multiple rib fractures 2/2 fall, on Xarelto with last dose 8/24. Clinically doing well and would not place an epidural given good respiratory status regardless of her anticoagulation status.    - Continue gabapentin 300mg q8h  - Continue robaxin 750mg q6h  - Continue oxycodone 2.5-5mg q4h PRN for moderate-severe pain  - Continue IV dilaudid 0.2mg q2h PRN for breakthrough pain    Patient admits to buying what she believed was Vicodin off the street to treat her pain        APS will sign off at this time. Thank you for the consult. All opioids and other analgesics to be written at discretion of primary team. Please contact Acute Pain Service - via [employer] from [phone] with additional questions or concerns. See [place] or [place] for additional contacts  and after hours information.    Pain History  Current pain location(s):  Pain Score: 8  Pain Location/Orientation: Orientation: Bilateral, Location: Rib Cage  Pain Scale: Pain Assessment Tool: 0-10  24 hour history: See above    Meds/Allergies   all current active meds have been reviewed    Allergies   Allergen Reactions    Coconut Oil - Food Allergy Hives    Iv Dye [Iodinated Contrast Media] Hives       Objective        Vitals:    08/24/24 2235 08/25/24 0247 08/25/24 0754 08/25/24 0756   BP: 108/67 109/68 109/69    BP Location:       Pulse: 60 62 61    Resp: 16 18 18    Temp: 98.5 °F (36.9 °C) 98.2 °F (36.8 °C) 98.2 °F (36.8 °C)    TempSrc:       SpO2: 92% 93% 94% 94%             Lab Results:   CrCl cannot be calculated (Unknown ideal weight.).  Lab Results   Component Value Date    WBC 5.85 08/25/2024    HGB 11.8 08/25/2024    HCT 38.2 08/25/2024     08/25/2024         Component Value Date/Time    K 4.2 08/25/2024 0508     08/25/2024 0508    CO2 23 08/25/2024 0508    BUN 22 08/25/2024 0508    CREATININE 2.49 (H) 08/25/2024 0508         Component Value Date/Time    CALCIUM 8.5 08/25/2024 0508    ALKPHOS 70 08/24/2024 1330    AST 49 (H) 08/24/2024 1330    ALT 24 08/24/2024 1330    TP 7.8 08/24/2024 1330    ALB 4.4 08/24/2024 1330       Imaging Studies/EKG: I have personally reviewed pertinent reports.       Counseling / Coordination of Care  Total floor / unit time spent today 15 minutes minutes. Greater than 50% of total time was spent with the patient and / or family counseling and / or coordination of care.   Please note that the APS provides consultative services regarding pain management only.  With the exception of ketamine and epidural infusions and except when indicated, final decisions regarding starting or changing doses of analgesic medications are at the discretion of the consulting service.    Kenneth Prabhakar MD   Acute Pain Service

## 2024-08-26 VITALS
SYSTOLIC BLOOD PRESSURE: 159 MMHG | HEART RATE: 61 BPM | BODY MASS INDEX: 33.39 KG/M2 | RESPIRATION RATE: 18 BRPM | HEIGHT: 67 IN | DIASTOLIC BLOOD PRESSURE: 79 MMHG | TEMPERATURE: 98 F | WEIGHT: 212.74 LBS | OXYGEN SATURATION: 94 %

## 2024-08-26 DIAGNOSIS — R06.00 DYSPNEA: ICD-10-CM

## 2024-08-26 DIAGNOSIS — I50.33 ACUTE ON CHRONIC DIASTOLIC (CONGESTIVE) HEART FAILURE (HCC): ICD-10-CM

## 2024-08-26 DIAGNOSIS — I50.33 ACUTE ON CHRONIC HEART FAILURE WITH PRESERVED EJECTION FRACTION (HCC): ICD-10-CM

## 2024-08-26 LAB
ANION GAP SERPL CALCULATED.3IONS-SCNC: 8 MMOL/L (ref 4–13)
BASOPHILS # BLD AUTO: 0.02 THOUSANDS/ÂΜL (ref 0–0.1)
BASOPHILS NFR BLD AUTO: 1 % (ref 0–1)
BUN SERPL-MCNC: 18 MG/DL (ref 5–25)
CALCIUM SERPL-MCNC: 8.6 MG/DL (ref 8.4–10.2)
CHLORIDE SERPL-SCNC: 108 MMOL/L (ref 96–108)
CO2 SERPL-SCNC: 24 MMOL/L (ref 21–32)
CREAT SERPL-MCNC: 2.05 MG/DL (ref 0.6–1.3)
EOSINOPHIL # BLD AUTO: 0.04 THOUSAND/ÂΜL (ref 0–0.61)
EOSINOPHIL NFR BLD AUTO: 1 % (ref 0–6)
ERYTHROCYTE [DISTWIDTH] IN BLOOD BY AUTOMATED COUNT: 15.8 % (ref 11.6–15.1)
GFR SERPL CREATININE-BSD FRML MDRD: 25 ML/MIN/1.73SQ M
GLUCOSE SERPL-MCNC: 82 MG/DL (ref 65–140)
HCT VFR BLD AUTO: 37 % (ref 34.8–46.1)
HGB BLD-MCNC: 11.6 G/DL (ref 11.5–15.4)
IMM GRANULOCYTES # BLD AUTO: 0.01 THOUSAND/UL (ref 0–0.2)
IMM GRANULOCYTES NFR BLD AUTO: 0 % (ref 0–2)
LYMPHOCYTES # BLD AUTO: 1.12 THOUSANDS/ÂΜL (ref 0.6–4.47)
LYMPHOCYTES NFR BLD AUTO: 28 % (ref 14–44)
MCH RBC QN AUTO: 26.9 PG (ref 26.8–34.3)
MCHC RBC AUTO-ENTMCNC: 31.4 G/DL (ref 31.4–37.4)
MCV RBC AUTO: 86 FL (ref 82–98)
MONOCYTES # BLD AUTO: 0.33 THOUSAND/ÂΜL (ref 0.17–1.22)
MONOCYTES NFR BLD AUTO: 8 % (ref 4–12)
NEUTROPHILS # BLD AUTO: 2.5 THOUSANDS/ÂΜL (ref 1.85–7.62)
NEUTS SEG NFR BLD AUTO: 62 % (ref 43–75)
NRBC BLD AUTO-RTO: 0 /100 WBCS
PLATELET # BLD AUTO: 119 THOUSANDS/UL (ref 149–390)
PMV BLD AUTO: 10.9 FL (ref 8.9–12.7)
POTASSIUM SERPL-SCNC: 4.1 MMOL/L (ref 3.5–5.3)
RBC # BLD AUTO: 4.32 MILLION/UL (ref 3.81–5.12)
SODIUM SERPL-SCNC: 140 MMOL/L (ref 135–147)
WBC # BLD AUTO: 4.02 THOUSAND/UL (ref 4.31–10.16)

## 2024-08-26 PROCEDURE — 80048 BASIC METABOLIC PNL TOTAL CA: CPT | Performed by: NURSE PRACTITIONER

## 2024-08-26 PROCEDURE — 94664 DEMO&/EVAL PT USE INHALER: CPT

## 2024-08-26 PROCEDURE — 99238 HOSP IP/OBS DSCHRG MGMT 30/<: CPT | Performed by: PHYSICIAN ASSISTANT

## 2024-08-26 PROCEDURE — 85025 COMPLETE CBC W/AUTO DIFF WBC: CPT | Performed by: NURSE PRACTITIONER

## 2024-08-26 PROCEDURE — 94760 N-INVAS EAR/PLS OXIMETRY 1: CPT

## 2024-08-26 PROCEDURE — NC001 PR NO CHARGE: Performed by: EMERGENCY MEDICINE

## 2024-08-26 RX ORDER — METOPROLOL SUCCINATE 50 MG/1
50 TABLET, EXTENDED RELEASE ORAL DAILY
Qty: 30 TABLET | Refills: 5 | Status: SHIPPED | OUTPATIENT
Start: 2024-08-26

## 2024-08-26 RX ORDER — AMLODIPINE BESYLATE 5 MG/1
5 TABLET ORAL 2 TIMES DAILY
Qty: 60 TABLET | Refills: 5 | Status: SHIPPED | OUTPATIENT
Start: 2024-08-26

## 2024-08-26 RX ORDER — BUMETANIDE 2 MG/1
2 TABLET ORAL DAILY
Qty: 60 TABLET | Refills: 5 | Status: SHIPPED | OUTPATIENT
Start: 2024-08-26

## 2024-08-26 RX ORDER — OXYCODONE HYDROCHLORIDE 5 MG/1
5 TABLET ORAL EVERY 4 HOURS PRN
Qty: 8 TABLET | Refills: 0 | Status: SHIPPED | OUTPATIENT
Start: 2024-08-26 | End: 2024-08-28

## 2024-08-26 RX ADMIN — METHOCARBAMOL 750 MG: 750 TABLET ORAL at 13:05

## 2024-08-26 RX ADMIN — OXYCODONE HYDROCHLORIDE 5 MG: 5 TABLET ORAL at 14:06

## 2024-08-26 RX ADMIN — AMLODIPINE BESYLATE 5 MG: 5 TABLET ORAL at 09:14

## 2024-08-26 RX ADMIN — OXYCODONE HYDROCHLORIDE 5 MG: 5 TABLET ORAL at 09:14

## 2024-08-26 RX ADMIN — METOPROLOL SUCCINATE 50 MG: 50 TABLET, EXTENDED RELEASE ORAL at 09:14

## 2024-08-26 RX ADMIN — PANTOPRAZOLE SODIUM 40 MG: 40 TABLET, DELAYED RELEASE ORAL at 05:04

## 2024-08-26 RX ADMIN — GABAPENTIN 300 MG: 300 CAPSULE ORAL at 09:14

## 2024-08-26 RX ADMIN — OXYCODONE HYDROCHLORIDE 5 MG: 5 TABLET ORAL at 02:45

## 2024-08-26 RX ADMIN — METHOCARBAMOL 750 MG: 750 TABLET ORAL at 05:04

## 2024-08-26 NOTE — INCIDENTAL FINDINGS
The following findings require follow up:  Radiographic finding   Findin. HEART: Cardiomegaly. Minimal coronary artery calcification     2. MEDIASTINUM AND LORENZO: Small hiatal hernia with mildly dilated distal esophagus suggesting esophageal reflux/dysmotility.     3. CHEST WALL AND LOWER NECK:   Hypoattenuating right thyroid nodule measuring 1.6 cm. further characterization with thyroid ultrasound is recommended.      4. LIVER: Hepatomegaly measuring 18.5 cm in maximal craniocaudal dimension. No suspicious lesions within the limitations of a noncontrast exam. Mildly dilated common bile duct measuring 11 mm post-cholecystectomy with smooth tapering to the ampulla.     5. KIDNEYS/URETERS: Punctate nonobstructing calculus at the upper pole of the right kidney. No hydronephrosis.     6. Colonic diverticulosis without acute diverticulitis.     7. Hemangioma in the T11 vertebral body. Mild degenerative changes of the right hip.      Follow up required: Yes   Follow up should be done within 2-4 week(s)    Please notify the following clinician to assist with the follow up:   Primary Care Provider.     Incidental finding results were discussed with the Patient by Glen Burkett PA-C on 24.   They expressed understanding and all questions answered.

## 2024-08-26 NOTE — DISCHARGE INSTR - AVS FIRST PAGE
Rib Fractures:   Seek medical attention if you develop worsening chest pain or shortness of breath, dizziness/lightheadness, fevers/chills or sweats.   No heavy lifting, pushing or pulling >10 pounds and no strenuous physical activity until cleared by trauma.   No work or driving while taking narcotic pain medications and until cleared by trauma.   Use your incentive spirometer at least hourly while awake.

## 2024-08-26 NOTE — DISCHARGE INSTR - OTHER ORDERS
Cindi Aldrich   Certified     [facility]  [phone]        CRISIS INFORMATION  If you are experiencing a mental health emergency, you may call the [facility] Crisis Intervention Office 24 hours a day, 7 days per week at [phone].    In [facility], call [phone].    Warmline is a confidential 24/7 telephone support service manned by trained mental health consumers.  Warmline provides support, a listening ear and can provide information about available services.Warmline specializes in the concerns of mental health consumers, their families and friends.  However, we are also here for anyone who has a mental health concern, is confused about or just doesn't know anything about mental health or where to get information.  To reach [facility], call [phone].    HOW TO GET SUBSTANCE ABUSE HELP:  If you or someone you know has a drug or alcohol problem, there is help:  [facility]: [phone]  [facility] Abuse Services: [phone]  An assessment is the first step.   In addition to those listed there are other programs available in the area but assessment is best to determine an appropriate level of care.  If you DO NOT have Medical Assistance (MA) or Private Insurance, an assessment can be scheduled at one of these providers:  [facility]  [address] [place] [address]  [phone]   [facility]  [address] [place] [address]  [phone]   [facility]  [address] [place] [address]  [phone]   [facility]  [address] [place] [address]  [phone]   [facility] [employer]  [address] [place] [address]  [phone]   [facility]  [address] [place] [address]  [phone]   [facility]  [address] [facility]  [address], [place] [address]  [phone]     If you HAVE Medical Assistance, an assessment can be scheduled at one of these providers:  [place] on Alcohol & Drug Abuse  [address] [place] [address]  [phone]   [facility]  [address] [place] [address]  [phone]   [facility]  [address], 1st Floor, [place] [address]  [phone]  [address] [place] [address], [address] [address] [phone]   [facility]  [address] [place] [address] [place] [address]  [phone]   [address] [place] [address]  [phone]   [facility]  [address] [place] [address]  [phone]   [facility] [facility]  [address] [place] [address]  [phone]   [facility] [employer]  [address] [facility] [place] [address]  [phone]   [facility]  [address] [place] [address]  [phone]   [employer]  [address] [facility], [address], [place] [address]  [phone]     If you HAVE Private Insurance, an assessment can be scheduled at one of these providers.  Please contact these Providers to determine if they are in your network plan:  [facility]  [address], 1st Floor, [place] [address]  [phone]   [address] [place] [address]  [phone]   [place] [address] [facility] [place] [address]  [phone]   NET ([facility])  [address] [place] [place] [address]  [phone]   [employer]  [address] [facility] [address] [place] [address]  [phone]   [facility].  [address] [facility], [place] [address]  [phone]     From the [facility] website www.[place].gov/guides/opioid-epidemic/#GetNaloxone    How do I get naloxone?  Family members and friends can access naloxone by:    Obtaining a  prescription from their family doctor  Using the standing order issued by Acting Physician General Rachel Chery. A standing order is a prescription written for the general public, rather than specifically for an individual.  To use the standing order, print it and take it with you to the pharmacy or have the digital version on your phone. Download the standing order from the Department of [facility] (PDF).    If you are unable to print it or use the digital version, the standing order is kept on file at many pharmacies. If a pharmacy does not have it on file, they may have the ability to look it up.    Naloxone prescriptions can be filled at most pharmacies. Although the medication might not be available for same-day pickup, it often can be ordered and available within a day or two.    Local Food Bank Locations & Contact Information:  [facility] Food [facility]ir  Municipalities:  [place], [place], [place], [place], [place], [place], [place], [place],  [place], and [place]  Emergency Food Pantries  [address]  Address: [address] [place] [address]  Phone: [phone]  Hours of Operation: Fridays 10:00AM-1:00PM  [facility]  Address: [address] [address]  Phone: [phone]  Hours of Operation: Thursdays 5:30PM-6:30PM    [facility] [address]  [facility] [employer]  Address: [address] [place] [address]  Phone: [phone]  Hours of Operation: Monday-Friday 9:30AM-12:00PM  [facility]  Address: [address] [address]  Phone: [phone]  Hours of Operation: By appointment -- Mondays, Tuesdays, Thursdays, & Fridays 8:30AM-4:00PM;  Wednesdays 8:30AM-12:00PM  Prashant Hinson  Address: [address] [address]  Phone: [phone]  Hours of Operation: 1st & 3rd Saturdays 10:00AM-12:00PM  [facility]  Address: [address] [place] [address]  Phone: [phone]  Hours of Operation: 2nd & 4th Thursdays 4:00PM-5:30PM (Only 4th Thursdays during the summer)  [facility]ian [facility]ist  Address: [address] [place] [address]  Phone: [phone]  Hours of Operation: By appointment -- Wednesdays 6:00PM  [facility]  Address: [address] [place] [address]  Phone: [phone]  Hours of Operation: Thursdays 11:00AM-2:00PM or By appointment  [facility]  Address: [address] [address]  Phone: [phone]  Hours of Operation: Saturdays 9:00AM-11:30AM  Caroline Anabaptist Adventist  Address: [address] [address]  Phone: [phone]  Hours of Operation: Fridays, 3rd & 4th Saturdays 9:00AM-11:00AM  [place] [facility]  Address: [address] [place] [address]  Phone: [phone]  Hours of Operation: Tuesdays 3:00PM-5:00PM or By appointment  [facility]  Address: [address] [place] [address]  Phone: [phone]  Hours of Operation: By appointment  [facility]ain  Address: [address] [place] [address]  Phone: [phone]  Hours of Operation: 3rd Saturday, 8:00AM-11:00AM  RIPPLE  Address: [address] [place] [address]  Phone: [phone]  Hours of Operation: 3rd Sunday 4:00PM-5:30PM    [place] [employer] ([employer])  Address: [address] [address]  Phone: [phone]  Hours of Operation: 3rd Wednesday 9:00AM-4:00PM  [place] [facility] Yazidi Adventist  Address: [address] [place] [address]  Phone: [phone]  Hours of Operation: 1st & 3rd Saturdays 9:00AM-11:30AM  [facility]  Address: [address], [place] [address]  Phone: [phone]  Hours of Operation: By appointment  [place] - [address]  [place] [facility] [employer] [facility]  Address: [address], [place] [address]  Phone: [phone]  Hours of Operation: [facility]  Sunday 12:00PM-1:30PM  [facility]  Address: [address] [facility] [place] [address]  Phone: [phone]  Hours of Operation: 1st & 3rd Thursdays 6:30PM-7:30PM; By appointment -- Mondays  [facility]  Address: [address] [place] [address]  Phone: [phone]  Hours of Operation: By appointment  [facility]  Address: [address] [place] [address]  Phone: [phone]  Hours of Operation: 1st & 3rd Wednesdays 4:00PM-5:30PM  [facility]  Address: [address] [address]  Phone: [phone]  Hours of Operation: 4th Wednesday 6:30PM-7:30PM  [facility]  Address: [address] [place] [address]  Phone: [phone]  Hours of Operation: 2nd Tuesday 10:00AM-12:00PM; 4th Thursday 4:00PM-6:00PM  [place] [address]  [facility] [employer] Non-Kosher)  Address: [address] [facility], [address]  Phone: [phone]  Hours of Operation: By appointment -- Monday-Friday 9:00AM-5:00PM  [place] [address]  [facility]  Address: [address] [place] [address]  Phone: [phone]  Hours of Operation: 1st & 3rd Tuesdays 9:00AM-10:30AM; Thursdays 6:00PM-8:00PM    [facility]  Address: [address] [place] [address]  Phone: [phone]  Hours of Operation: Every other Saturday 10:00AM-12:00PM  [facility]  Address: [address] [place] [address]  Phone: [phone]  Hours of Operation: 3rd Monday 6:00PM-7:30PM  [place] [address]  [facility]  Address: [address] [place] [address]  Phone: [phone]  Hours of Operation: 4th Sunday 9:00AM-11:00AM   Corjyotirosalina Hernandez Donny  Address: [address] [place] [address]  Phone: [phone]  Hours of Operation: Saturdays 10:30AM-12:30PM  [facility]  Address: [address] [place] [address]  Phone:  [phone]  Hours of Operation: By appointment -- Tuesday-Thursday 8:30AM-4:30PM  [facility]  Address: [address] [facility] [place] [address]  Phone: [phone]  Hours of Operation: 1st & 3rd Wednesdays 6:00PM-8:00PM  [place] - [address]  [facility] Food [facility]  Address: [address] [facility], [place] [address]  Phone: [phone]  Hours of Operation: Wednesdays & Fridays 3:00PM-4:00PM  [facility] [address]  Joan Navarros Pantry  Address: [address] [facility], [place] [address]  Phone: [phone]  Hours of Operation: Every other Saturday 10:30AM-12:30PM  [place] - [address]  [facility]  Address: [address] [facility] [place] [address]  Phone: [phone]  Hours of Operation: Tuesdays & Wednesdays 10:00AM-2:00PM; Closed last week of the month  Jacinto Gutierrez - [address]  [facility]  Address: [facility] [place] [address]  Phone: [phone]  Hours of Operation: 1st Saturday 9:00AM-12:00PM; 3rd Thursday 4:00PM-7:00PM    [place] - [address]  [facility]  Address: [address] [facility], [place] [address]  Phone: [phone]  Hours of Operation: 3rd Monday & 3rd Wednesday 4:00PM-6:00PM; 3rd Saturday 10:00AM-12:00PM  [place] - [address]  [facility]  Address: [address] [place] [address]  Phone: [phone]  Hours of Operation: 1st & 3rd Mondays 9:00AM-11:30AM  [place]  [address]  [facility]  Address: [address] [facility] [address] [place] [address]  Phone: [phone]  Hours of Operation: 2nd Saturday 9:00AM-11:00AM  [facility] [address]  [facility] Food [facility]  Address: [address] [place] [address]  Phone: [phone]  Hours of Operation: 1st, 2nd, & 3rd Thursdays 4:00PM-7:00PM; Last Saturday 9:30AM-12:00PM  [address]  Address: [address] [place] [address]  Phone: [phone]  Hours of Operation: Tuesdays 6:00PM-7:00PM; Saturdays 4:00PM-5:00PM; Sundays  12:30PM-1:30PM  [place] [facility]  Address: [address] [facility], [place] [address]  Phone: [phone]  Hours of Operation: By appointment -- Mondays 6:00PM      Soup Marge  [facility]  Address: [address] [facility] Hospital[facility] hernandez [address]  Phone: [phone]  Hours of Operation: Monday-Friday 1:30PM-2:30PM  [facility])  Address: [address] [facility] [place] [address]  Phone: [phone]  Hours of Operation: Monday-Friday 9:00AM-5:00PM  [facility]  Address: [address] [address]  Phone: [phone]  Hours of Operation: Tuesday-Thursday 12:00PM-1:00PM    [facility]  Address: [address] [place] [address]  Phone: [phone]  Hours of Operation: Sundays 8:00AM-9:00AM; Some holidays  [facility] Food Muir  Municipalities:  [facility], [place], [place], [place], [place], and [place]  Emergency Food Pantries  [place] - [address]  [facility] Food [facility]  Address: [address] [place] [address]  Phone: [phone]  Hours of Operation: 2nd Tuesday 10:00AM-12:00PM  [place] - [address]  [facility]  Address: [address] [facility], [place] [address]  Phone: [phone]  Hours of Operation: Wednesdays 10:00AM-12:00PM  [facility]ior Mej[facility]  Address: [address] [facility] [place] [address]  Phone: [phone]  Hours of Operation: 1st & 3rd Tuesdays 5:00PM-6:00PM  [facility]  Address: [address] [facility], [place], [address]  Phone: [phone]  Hours of Operation: Tuesdays 6:00PM-7:00PM  [facility] Tarik Mistryecostal  Address: [address] [place] [address]  Phone: [phone]  Hours of Operation: 1st & 2nd Saturdays 12:00PM- 4:00PM  [facility]  Address: [address] [place] [address], [place] [address]  Phone: [phone]  Hours of Operation: Monday-Friday 10:30AM-11:30AM  [facility]  Address: [facility], [place]  [address]  Phone: [phone]  Hours of Operation: 3rd & 4th Saturdays 9:00AM-11:00AM; [facility] residents only    [place] - [address]  [place] [facility]  Address: [address] [facility], [place] [address]  Phone: [phone]  Hours of Operation: 2nd Thursday 10:00AM-12:00PM  [facility] Food Pantry  Address: [address] [facility], [place] [address]  Phone: [phone]  Hours of Operation: Monday & Tuesday of the first full week of the month 9:00AM-11:00AM  [facility]  Address: [address] [facility], [place] [address]  Phone: [phone]  Hours of Operation: Mondays, Wednesdays, & Thursdays 9:30AM-11:30AM  [facility]  Address: [address] [facility], [place] [address]  Phone: [phone]  Hours of Operation: 2nd & 4th Saturdays 10:00AM-12:00PM  [place] - [address]  [place] [facility]  Address: [address] [facility], [place] [address]  Phone: [phone]  Hours of Operation: By appointment -- Tuesdays & Wednesdays 10:00AM-4:00PM, Thursdays 10:00AM-  12:00PM, & Sundays 4:00PM-7:00PM  [facility] [place]  Address: [address] [facility], [place] [address]  Phone: [phone]  Hours of Operation: 3rd Saturday 10:00AM-12:00PM  JESSICA Dugan  Address: [address] [facility], [place] [address]  Phone: [phone]  Hours of Operation: 3rd Saturday 9:00AM-11:30AM or by appointment  [facility]  Address: [address] [facility], [place] [address]  Phone: [phone]  Hours of Operation: 2nd & 4thTuesdays 10:00AM-12:00PM  [facility] Pantry  Address: [address] [place] [address]  Phone: [phone]  Hours of Operation: 1st, 2nd, & 4th Wednesdays 11:00AM-1:00PM; 3rd Wednesday 5:00PM-7:00PM  [facility]  Address: [address] [place] [address]  Phone: [phone]  Hours of Operation: Wednesdays 10:00AM-12:00PM; Last Wednesday 6:00PM-8:00PM  [address]  Address: [address] [facility] [place] [address]  Phone: [phone]  Hours of Operation: Fridays 8:30AM-11:30AM & 1:30PM-3:30PM    [facility]  Address: [address] [place] [address]  Phone: [phone]  Hours of Operation: By appointment--Mondays-Fridays 9:00AM -4:30PM.  [facility]  Address: [address] [place] [address]  Phone: [phone]  Hours of Operation: 1st & 3rd Tuesdays 6:00PM-7:30PM  [facility]  Address: [address] [place] [address]  Phone: [phone]  Hours of Operation: By appointment -- Mondays-Fridays 9:00AM-5:00PM  [facility]  Address: [address] [place] [address]  Phone: [phone]  Hours of Operation: 3rd, 4th, & 5th Thursdays 5:00PM-7:00PM  [facility]  Address: [address] [place] [address]  Phone: [phone]  Hours of Operation: Mondays 10:00AM-12:30PM; Thursdays 10:00AM-12:30PM & 1:00PM-3:30 PM  [facility] [employer], [facility]  Address: [address] [facility], [address] [address]  Phone: [phone]  Hours of Operation: Tuesdays 5:00PM-6:00PM  [facility]  Address: [address] [place] [address]  Phone: [phone]  Hours of Operation: Thursdays 6:00PM-7:30PM; Closed 5th Thursday  [place] - [address]  [facility]  Address: [address] [facility] [place] [address]  Phone: [phone]  Hours of Operation: For last names beginning with A-M: 2nd Tuesday 8:00AM-10:00AM or 6:00PM-7:00PM;  For last names beginning with N-Z: 2nd Wednesday 8:00AM-10:00AM  [address] [facility]  Address: [address] [facility], [place] [address]  Phone: [phone]  Hours of Operation: Wednesdays 9:30AM-12:00PM; 1st, 2nd, & 3rd Thursdays 6:00PM-8:00PM; 2nd & 3rd Saturdays 9:30AM-12:00PM  [place] - [address]  [facility]  Address: [address] [place] [address], [place] [address]  Phone: [phone]  Hours of Operation: 3rd Saturday 9:00AM-11:00AM    PCCT Pen  Nenoyl  Address: [address] [facility], [place] [address]  Phone: [phone]  Hours of Operation: Tuesdays 10:00AM-2:00PM  [place] [facility]  Address: [address] [facility], [place] [address]  Phone: [phone]  Hours of Operation: Tuesdays 10:00AM-12:00PM; Closed 5th Tuesday  [place] - [address]  [employer]  Address: [address] [place] [address]  Phone: [phone]  Hours of Operation: Mondays 11:00AM-12:30PM & 7:00PM-8:30PM  [place]  [address] [place] [address] Food Pantry  Address: [address] [facility] [place] [place] [address]  Phone: [phone]  Hours of Operation: Mondays 5:00PM-7:00PM  [facility]  Address: [address] [facility], [place] [address]  Phone: [phone]  Hours of Operation: 2nd & 4th Saturdays 9:00AM-10:00AM  Soup Marge  [place]  [facility] [place]  Address: [address] [facility], [place] [address]  Phone: [phone]  Hours of Operation: 2nd Saturday - Lunch (Call for times)  [place]  [place] [facility]  Address: [address] [facility] [place] [address]  Phone: [phone]  Ours of Operation: Sundays 1:00PM-2:00PM  [facility]  Address: [address] [facility], [place] [address]  Phone: [phone]  Hours of Operation: Saturdays 12:00PM-1:00PM  [facility]  Address: [address] [facility] [place] [address]  Phone: [phone]  Hours of Operation: Monday-Friday 8:00AM-4:00PM  Isabelle Islam [employer]  Address: [address] [facility], [place] [address]  Phone: [phone]  Hours of Operation: Monday-Friday 12:00PM-1:00PM    [facility]  Address: [address] [place] [address] [place] [address]  Phone: [phone]  Hours of Operation: Call for times  [facility]  Address: [address] [place] [address], [place] [address]  Phone: [phone]  Hours of Operation: Monday-Friday 12:00PM-1:00PM      [address] [place] [address]  [facility]  Hours of Operation:Every day of the  week. Opens: 7:00pm Closes: 7:00am   Intake Procedure:Walk-in, subject to bed capacity.  Description of Service:   An emergency, cold-weather shelter for adult men and women, available on a walk-in basis before posted curfew hours. Snacks served, hot beverages, hygiene kits, clothing closet, case management available.  Opens: 7:00pm  Curfew: 10:00pm (no in/out past this time)  Lights Out: 11:00pm   Wake-up Call: 6:00am   Clean-up: 6:00am to 7:00am  Closes: 7:00am  Eligibility:Homeless men and women who have no other shelter options; unable to serve families  Service Contact:[phone], [email or web address]

## 2024-08-26 NOTE — DISCHARGE SUMMARY
"  Discharge Summary - Lupe Menjivar 59 y.o. female MRN: [id number]    Unit/Bed#: [facility] 615-01 Encounter: [id number]    Admission Date:   Admission Orders (From admission, onward)       Ordered        08/24/24 1435  Inpatient Admission  Once                            Admitting Diagnosis: Multiple fractures of ribs, bilateral, initial encounter for closed fracture [S22.43XA]  Other injury of unspecified body region, initial encounter [T14.8XXA]    HPI: Per Andi Song, \"Lupe Menjivar with history of CAD & Afib (s/p pacemaker) that she takes Xarelto for, presenting today for evaluation after a presumed fall.  found patient down in kitchen for an unknown amount of time. Presumed to have fallen. EMS found patient to me minimally responsive with snoring respirations. Intranasal narcan was administered and patient became more responsive. Patient arrived into trauma bay without C-collar in place, GCS=14 (confusion), and AAOx2 (self, time) Complaining of neck, chest, abdominal, and mid-back pain.\"    Procedures Performed:   Orders Placed This Encounter   Procedures    Fast Ultrasound       Summary of Hospital Course: Patient is a 59-year-old female comes in for evaluation status post presumed fall.  Noted to have multiple rib fractures.  Was admitted to the trauma floor.  Did well during her hospital course.  Was ultimately discharged on 8/26/2024.  Was given a CRS consult prior to discharge secondary to her drug use history.  Was given a 3-day taper off of narcotics.  Informed her that narcotics would not be prescribed from the trauma team at that juncture.  She was in agreement with plan.  She would have close interval outpatient follow-up with the trauma team for reevaluation of her recovery for rib fractures and she would be recommended to follow-up outpatient with her primary care provider to review incidental findings.      Significant Findings, Care, Treatment and Services Provided:   XR knee 1 or 2 " vw right    Result Date: 8/26/2024  Impression: Total knee arthroplasty without evidence of a hardware complication or failure. Computerized Assisted Algorithm (CAA) may have been used to analyze all applicable images. Workstation performed: [id number]     TRAUMA - CT head wo contrast    Result Date: 8/24/2024  Impression: No intracranial hemorrhage or calvarial fracture. I personally discussed this study with YARA FLORES on 8/24/2024 2:21 PM. Workstation performed: [id number]     XR Trauma multiple ([facility] trauma bay ONLY)    Result Date: 8/24/2024  Impression: No acute cardiopulmonary disease within limitations of supine imaging. Workstation performed: [id number]     XR chest 1 view    Result Date: 8/24/2024  Impression: No acute cardiopulmonary disease within limitations of supine imaging. Workstation performed: [id number]     TRAUMA - CT spine cervical wo contrast    Result Date: 8/24/2024  Impression: No cervical spine fracture or traumatic malalignment. I personally discussed this study with YARA FLORES on 8/24/2024 2:21 PM. Workstation performed: [id number]     CT chest abdomen pelvis wo contrast    Result Date: 8/24/2024  Impression: 1.  Probable incomplete nondisplaced fractures of the anterolateral right 4th-7th ribs and left 3rd-5th ribs. Correlate for focal tenderness. 2.  Incidental discovery of one or more thyroid nodule(s) measuring more than 1.5 cm and without suspicious features is noted in this patient who is above 35 years old; according to guidelines published in the February 2015 white paper on incidental thyroid nodules in the Journal of the American College of Radiology (JACR), further characterization with thyroid ultrasound is recommended. 3.  Hepatomegaly. I personally discussed this study with YARA FLORES on 8/24/2024 2:21 PM. Workstation performed: [id number]        Complications: no complications    Discharge Diagnosis:   Patient Active Problem List   Diagnosis     Multiple fractures of ribs, bilateral, initial encounter for closed fracture    Opiate or related narcotic overdose ([facility])    Atrial flutter by electrocardiogram ([facility])    Essential hypertension    Right knee pain    Fall    Heart failure (HCC)    Acute-on-chronic kidney injury  ([facility])         Medical Problems       Resolved Problems  Date Reviewed: 8/26/2024            Resolved    Diabetes mellitus, type 2 ([facility]) 8/25/2024     Resolved by  CHARLOTTE Seymour          Condition at Discharge: good         Discharge instructions/Information to patient and family:   See after visit summary for information provided to patient and family.      Provisions for Follow-Up Care:  See after visit summary for information related to follow-up care and any pertinent home health orders.      PCP: No primary care provider on file.    Disposition: Home    Planned Readmission: No      Discharge Statement   I spent 22 minutes discharging the patient. This time was spent on the day of discharge. I had direct contact with the patient on the day of discharge. Additional documentation is required if more than 30 minutes were spent on discharge.     Discharge Medications:  See after visit summary for reconciled discharge medications provided to patient and family.

## 2024-08-26 NOTE — RESPIRATORY THERAPY NOTE
RT Protocol Note  Lupe Menjivar 59 y.o. female MRN: [id number]  Unit/Bed#: [facility] 615-01 Encounter: [id number]    Assessment    Principal Problem:    Multiple fractures of ribs, bilateral, initial encounter for closed fracture  Active Problems:    Opiate or related narcotic overdose (HCC)    Atrial flutter by electrocardiogram (HCC)    Essential hypertension    Right knee pain    Fall    Heart failure (HCC)    Acute-on-chronic kidney injury  (HCC)      Home Pulmonary Medications:  none       No past medical history on file.  Social History     Socioeconomic History    Marital status: /Civil Union     Spouse name: Not on file    Number of children: Not on file    Years of education: Not on file    Highest education level: Not on file   Occupational History    Not on file   Tobacco Use    Smoking status: Every Day     Types: Cigarettes     Start date: 1994    Smokeless tobacco: Not on file   Substance and Sexual Activity    Alcohol use: Not Currently    Drug use: Not on file    Sexual activity: Not on file   Other Topics Concern    Not on file   Social History Narrative    Not on file     Social Determinants of Health     Financial Resource Strain: Not on file   Food Insecurity: Patient Declined (8/26/2024)    Hunger Vital Sign     Worried About Running Out of Food in the Last Year: Patient declined     Ran Out of Food in the Last Year: Patient declined   Transportation Needs: Patient Declined (8/26/2024)    PRAPARE - Transportation     Lack of Transportation (Medical): Patient declined     Lack of Transportation (Non-Medical): Patient declined   Physical Activity: Not on file   Stress: Not on file   Social Connections: Not on file   Intimate Partner Violence: Not on file   Housing Stability: Patient Declined (8/26/2024)    Housing Stability Vital Sign     Unable to Pay for Housing in the Last Year: Patient declined     Number of Times Moved in the Last Year: 2     Homeless in the Last Year: Patient declined  "      Subjective         Objective    Physical Exam:        Vitals:  Blood pressure 160/78, pulse 61, temperature 97.9 °F (36.6 °C), resp. rate 12, height 5' 7\" (1.702 m), weight 96.5 kg (212 lb 11.9 oz), SpO2 92%.          Imaging and other studies: I have personally reviewed pertinent reports.      O2 Device: RA     Plan       Airway Clearance Plan: (P) Discontinue Protocol     Resp Comments: (P) Pt is exceeding IS goals and performing independently   "

## 2024-08-26 NOTE — CERTIFIED RECOVERY SPECIALIST
Certified  Note    Patient name: Lupe Menjivar  Location: [facility] 615/[facility] 615-01  [place]: [facility]  Attending:  Estefania Banerjee MD MRN [id number]  : 1965  Age: 59 y.o.    Sex: female Date 2024         Substance Use History:     Social History     Substance and Sexual Activity   Alcohol Use Not Currently        Social History     Substance and Sexual Activity   Drug Use Not on file      Time spent: 20 min   Consult rcvd: Y  Patient seen in: IP  Stage of change:  Patient in chronic pain - took what she thought was pain med - Pill was pressed fentanyl        CRS provided introductions and explanation of service. CRS and patient engaged in conversation of hospitalization. Patient discussed needs she feels would be appropriate. CRS shared understanding.     Patient (also in room SO, son and daughter in law, confirmed okay to talk freely) reports she has no hx of JOSEFINA or AUD. Patient stated she has chronic arthritis and is always in pain. Bought what she thought was a pain pill and doesn't remember anything after that.    CRS educated the family regarding street drugs and what can be in them. Encouraged patient to follow up with PCP and get a referral to specialist to treat her pain and NOT to self medicate.     CRS will continue to follow until discharge    Resources provided and contact info given    EPIC chat to provider to ask for Narcan script for patient   -          Cindi Aldrich

## 2024-08-26 NOTE — CASE MANAGEMENT
Case Management Assessment & Discharge Planning Note    Patient name Lupe Menjivar  Location [facility] 615/[facility] 615-01 MRN [id number]  : 1965 Date 2024       Current Admission Date: 2024  Current Admission Diagnosis:Multiple fractures of ribs, bilateral, initial encounter for closed fracture   Patient Active Problem List    Diagnosis Date Noted Date Diagnosed    Right knee pain 2024     Fall 2024     Heart failure (HCC) 2024     Acute-on-chronic kidney injury  (HCC) 2024     Multiple fractures of ribs, bilateral, initial encounter for closed fracture 2024     Opiate or related narcotic overdose (HCC) 2024     Atrial flutter by electrocardiogram (HCC) 2024     Essential hypertension 2024       LOS (days): 2  Geometric Mean LOS (GMLOS) (days):   Days to GMLOS:     OBJECTIVE:    Risk of Unplanned Readmission Score: 11.78         Current admission status: Inpatient       Preferred Pharmacy:   UNKNOWN - FOLLOW UP PRIOR TO DISCHARGE TO E-PRESCRIBE  No address on file      Primary Care Provider: No primary care provider on file.    Primary Insurance: [employer]  Secondary Insurance:     ASSESSMENT:  Active Health Care Proxies    There are no active Health Care Proxies on file.       Advance Directives  Primary Contact: Raffy Menjivar (Spouse)   [phone]    Readmission Root Cause  30 Day Readmission: No    Patient Information  Admitted from:: Home  Mental Status: Alert  During Assessment patient was accompanied by: Not accompanied during assessment  Assessment information provided by:: Patient  Primary Caregiver: Self  Support Systems: Self, Spouse/significant other, Friends/neighbors  County of Residence: [place]  What city do you live in?: [place]  Home entry access options. Select all that apply.: Elevator  Type of Current Residence: Apartment  Floor Level: 1  Upon entering residence, is there a bedroom on the main floor (no further  steps)?: Yes  Upon entering residence, is there a bathroom on the main floor (no further steps)?: Yes  Living Arrangements: Lives w/ Spouse/significant other  Is patient a ?: No    Activities of Daily Living Prior to Admission  Functional Status: Independent  Completes ADLs independently?: Yes  Ambulates independently?: Yes  Does patient use assisted devices?: No    Social Determinants of Health (SDOH)      Flowsheet Row Most Recent Value   Housing Stability    In the last 12 months, was there a time when you were not able to pay the mortgage or rent on time? Pt Declined   In the past 12 months, how many times have you moved where you were living? 2   At any time in the past 12 months, were you homeless or living in a shelter (including now)? Pt Declined   Transportation Needs    In the past 12 months, has lack of transportation kept you from medical appointments or from getting medications? Pt Declined   In the past 12 months, has lack of transportation kept you from meetings, work, or from getting things needed for daily living? Pt Declined   Food Insecurity    Within the past 12 months, you worried that your food would run out before you got the money to buy more. Pt Declined   Within the past 12 months, the food you bought just didn't last and you didn't have money to get more. Pt Declined   Utilities    In the past 12 months has the electric, gas, oil, or water company threatened to shut off services in your home? Pt Declined          DISCHARGE DETAILS:    Discharge planning discussed with:: Raffy Menjivar (Spouse) [phone]  Freedom of Choice: Yes     CM contacted family/caregiver?: Yes  Were Treatment Team discharge recommendations reviewed with patient/caregiver?: Yes  Did patient/caregiver verbalize understanding of patient care needs?: Yes  Were patient/caregiver advised of the risks associated with not following Treatment Team discharge recommendations?: Yes    Contacts  Patient Contacts: Raffy  Otto (Spouse) [phone]  Relationship to Patient:: Family  Contact Method: Phone  Phone Number: [phone]  Reason/Outcome: Emergency Contact, Continuity of Care, Discharge Planning      Treatment Team Recommendation: Home  Discharge Destination Plan:: Home      CM met with pt to discuss the role of CM, as well as d/c planning  Pt resides in a 1st floor apartment, with her spouse, which has elevator access. Pt has a tub/shower with grab bars. Pt has access to a walker, SPC and manual wheelchair. Pt drives. Pt is independent for all ADL/iADLs. Pt enjoys reading. Pt's had 1 fall.  Pt was evaluated by OT/PT and cleared for a home d/c with no needs.   Pt's spouse will transport home      CM reviewed d/c planning process including the following: identifying help at home, patient preference for d/c planning needs, Discharge Lounge, [facility] Meds to Bed program, availability of treatment team to discuss questions or concerns patient and/or family may have regarding understanding medications and recognizing signs and symptoms once discharged.  CM also encouraged patient to follow up with all recommended appointments after discharge. Patient advised of importance for patient and family to participate in managing patient’s medical well being.

## 2024-08-26 NOTE — ASSESSMENT & PLAN NOTE
Lab Results   Component Value Date    EGFR 25 08/26/2024    EGFR 21 08/25/2024    EGFR 20 08/25/2024    CREATININE 2.05 (H) 08/26/2024    CREATININE 2.36 (H) 08/25/2024    CREATININE 2.49 (H) 08/25/2024   Patient has chronic kidney disease stage IV-baseline appears to be 1.7  On arrival creatinine was 2.37-chetan to 2.49 despite receiving IVF  Chronically on diuretics -- confirm home medication regimen  Cr continues to downtrend

## 2024-08-26 NOTE — TELEPHONE ENCOUNTER
Medication: amlodipine    Dose/Frequency: 5mg / 1 tab 2x day    Quantity: 60/ 3 refills    Pharmacy: [facility]    Office:   [] PCP/Provider -   [x] Speciality/Provider -     Does the patient have enough for 3 days?   [] Yes   [x] No - Send as HP to POD    Medication: bumetanide    Dose/Frequency: 2mg / 1 tab by mouth daily    Quantity: 60 / 3 refills    Pharmacy: [facility]    Office:   [] PCP/Provider -   [x] Speciality/Provider -     Does the patient have enough for 3 days?   [] Yes   [x] No - Send as HP to POD    Medication: Jardiance    Dose/Frequency: 10mg / 1 tab every morning    Quantity: 30 / 3 refills    Pharmacy: [facility]    Office:   [] PCP/Provider -   [x] Speciality/Provider -     Does the patient have enough for 3 days?   [] Yes   [x] No - Send as HP to POD    Medication: Metoprolol Succ    Dose/Frequency: 50mg / 1 tab daily    Quantity: 30 / 3 refills    Pharmacy: [facility]    Office:   [] PCP/Provider -   [x] Speciality/Provider -     Does the patient have enough for 3 days?   [] Yes   [x] No - Send as HP to POD

## 2024-08-26 NOTE — PLAN OF CARE
Problem: PAIN - ADULT  Goal: Verbalizes/displays adequate comfort level or baseline comfort level  Description: Interventions:  - Encourage patient to monitor pain and request assistance  - Assess pain using appropriate pain scale  - Administer analgesics based on type and severity of pain and evaluate response  - Implement non-pharmacological measures as appropriate and evaluate response  - Consider cultural and social influences on pain and pain management  - Notify physician/advanced practitioner if interventions unsuccessful or patient reports new pain  Outcome: Progressing     Problem: INFECTION - ADULT  Goal: Absence or prevention of progression during hospitalization  Description: INTERVENTIONS:  - Assess and monitor for signs and symptoms of infection  - Monitor lab/diagnostic results  - Monitor all insertion sites, i.e. indwelling lines, tubes, and drains  - Monitor endotracheal if appropriate and nasal secretions for changes in amount and color  - [place] appropriate cooling/warming therapies per order  - Administer medications as ordered  - Instruct and encourage patient and family to use good hand hygiene technique  - Identify and instruct in appropriate isolation precautions for identified infection/condition  Outcome: Progressing     Problem: SAFETY ADULT  Goal: Maintain or return to baseline ADL function  Description: INTERVENTIONS:  -  Assess patient's ability to carry out ADLs; assess patient's baseline for ADL function and identify physical deficits which impact ability to perform ADLs (bathing, care of mouth/teeth, toileting, grooming, dressing, etc.)  - Assess/evaluate cause of self-care deficits   - Assess range of motion  - Assess patient's mobility; develop plan if impaired  - Assess patient's need for assistive devices and provide as appropriate  - Encourage maximum independence but intervene and supervise when necessary  - Involve family in performance of ADLs  - Assess for home care  needs following discharge   - Consider OT consult to assist with ADL evaluation and planning for discharge  - Provide patient education as appropriate  Outcome: Progressing     Problem: DISCHARGE PLANNING  Goal: Discharge to home or other facility with appropriate resources  Description: INTERVENTIONS:  - Identify barriers to discharge w/patient and caregiver  - Arrange for needed discharge resources and transportation as appropriate  - Identify discharge learning needs (meds, wound care, etc.)  - Arrange for interpretive services to assist at discharge as needed  - Refer to Case Management Department for coordinating discharge planning if the patient needs post-hospital services based on physician/advanced practitioner order or complex needs related to functional status, cognitive ability, or social support system  Outcome: Progressing     Problem: Knowledge Deficit  Goal: Patient/family/caregiver demonstrates understanding of disease process, treatment plan, medications, and discharge instructions  Description: Complete learning assessment and assess knowledge base.  Interventions:  - Provide teaching at level of understanding  - Provide teaching via preferred learning methods  Outcome: Progressing     Problem: RESPIRATORY - ADULT  Goal: Achieves optimal ventilation and oxygenation  Description: INTERVENTIONS:  - Assess for changes in respiratory status  - Assess for changes in mentation and behavior  - Position to facilitate oxygenation and minimize respiratory effort  - Oxygen administered by appropriate delivery if ordered  - Initiate smoking cessation education as indicated  - Encourage broncho-pulmonary hygiene including cough, deep breathe, Incentive Spirometry  - Assess the need for suctioning and aspirate as needed  - Assess and instruct to report SOB or any respiratory difficulty  - Respiratory Therapy support as indicated  Outcome: Progressing

## 2024-08-26 NOTE — PROGRESS NOTES
[facility], [place]  Progress Note  Name: Lupe Menjivar I  MRN: [id number]  Unit/Bed#: PPHP 615-01 I Date of Admission: 8/24/2024   Date of Service: 8/26/2024 I Hospital Day: 2    Assessment & Plan   Acute-on-chronic kidney injury  (HCC)  Assessment & Plan  Lab Results   Component Value Date    EGFR 25 08/26/2024    EGFR 21 08/25/2024    EGFR 20 08/25/2024    CREATININE 2.05 (H) 08/26/2024    CREATININE 2.36 (H) 08/25/2024    CREATININE 2.49 (H) 08/25/2024   Patient has chronic kidney disease stage IV-baseline appears to be 1.7  On arrival creatinine was 2.37-chetan to 2.49 despite receiving IVF  Chronically on diuretics -- confirm home medication regimen  Cr continues to downtrend      Heart failure (HCC)  Assessment & Plan  Wt Readings from Last 3 Encounters:   08/26/24 96.5 kg (212 lb 11.9 oz)   Most recent echocardiogram showed: Echocardiogram (6/14/2024): Left ventricular ejection fraction 60%.  Wall motion is normal.  Grade 2 diastolic dysfunction.  No significant valvular disease.  Left atrial dilation 35 to 41 mL/M2)   Continue on home metoprolol  In the setting of DANIELA with chronic diuretic use will trial Bumex to see response            Fall  Assessment & Plan  Injuries listed below  Associated with street drug use  PT/OT    Right knee pain  Assessment & Plan  Patient complains of new right knee pain  XR pending  Analgesia as needed  PT/OT    Essential hypertension  Assessment & Plan  Continue home amlodipine and metoprolol  Continue to monitor blood pressure  Follow-up with PCP as an outpatient    Atrial flutter by electrocardiogram ([facility])  Assessment & Plan  Continue home metoprolol and Xarelto    Opiate or related narcotic overdose ([facility])  Assessment & Plan  Patient found down in her kitchen.  Reports buying a Vicodin off the street-UDS positive for fentanyl  Educated on street drug risks, patient verbally confirmed understanding and states that that is the last time she  will do that.  We also discussed, her chronic pain in her right shoulder and left hip that she was attempting to treat with this medication.  Will likely require pain management referral as an outpatient.  Currently treating with multimodal pain regimen--she understands that her current regimen is for her acute pain and this will not be a recurrent prescription after initial discharge.  APS consulted note appreciated    * Multiple fractures of ribs, bilateral, initial encounter for closed fracture  Assessment & Plan  Secondary to fall  CT imaging showed incomplete nondisplaced fractures of anterior lateral right 4-7 ribs and left 3-5 ribs  Rib fracture protocol  Multimodal pain regimen  APS consulted  Continue to encourage incentive spirometry use-currently inspiring 1.5 L  PT/OT  Follow-up with trauma as needed         DVT prophylaxis: SCDs and Xarelto  PT and OT: eval and treat    Disposition: DC from trauma service today.  Patient is medically stable for discharge at this time.  CRS consult placed today.  Outpatient follow-up in 2 weeks.  Patient discussed at bedside regarding her pain regimen; she is in agreement for a 2-day taper off of narcotics.    Subjective   Chief Complaint: My ribs hurt    Subjective: Patient reports some rib pain today on presentation.     Objective   Vitals:   Temp:  [98 °F (36.7 °C)-98.2 °F (36.8 °C)] 98 °F (36.7 °C)  HR:  [61-77] 71  Resp:  [16-18] 18  BP: (109-166)/(67-92) 166/92    I/O         08/24 0701  08/25 0700 08/25 0701  08/26 0700 08/26 0701  08/27 0700    P.O. 780 840     I.V. (mL/kg) 700 202.5 (2.1)     Total Intake(mL/kg) 1480 1042.5 (10.8)     Urine (mL/kg/hr) 1      Total Output 1      Net +1479 +1042.5            Unmeasured Urine Occurrence 2 x 2 x     Unmeasured Emesis Occurrence 1 x               Physical Exam:   GENERAL APPEARANCE: NAD  NEURO: GCS 15  HEENT: Normocephalic  CV: RRR  LUNGS: CTA b/l  GI: Non-tender, non-distended  : no reid  MSK: moving all  extremities, +2 pulses  SKIN: warm, dry, intact    Invasive Devices       Peripheral Intravenous Line  Duration             Peripheral IV 08/24/24 Dorsal (posterior);Left Forearm 1 day                     PIC Score  PIC Pain Score: 3 (8/26/2024  4:00 AM)  PIC Incentive Spirometry Score: 3 (8/26/2024  4:00 AM)  PIC Cough Description: 3 (8/26/2024  4:00 AM)  PIC Total Score: 9 (8/26/2024  4:00 AM)       If the Total PIC Score </=5, did you consult APS and evaluate patient for further intervention?: no      Pain:    Incentive Spirometry  Cough  3 = Controlled  4 = Above goal volume 3 = Strong  2 = Moderate  3 = Goal to alert volume 2 = Weak  1 = Severe  2 = Below alert volume 1 = Absent     1 = Unable to perform IS         Lab Results: Results: I have personally reviewed all pertinent laboratory/tests results, BMP/CMP:   Lab Results   Component Value Date    SODIUM 140 08/26/2024    K 4.1 08/26/2024     08/26/2024    CO2 24 08/26/2024    BUN 18 08/26/2024    CREATININE 2.05 (H) 08/26/2024    CALCIUM 8.6 08/26/2024    EGFR 25 08/26/2024   , and CBC:   Lab Results   Component Value Date    WBC 4.02 (L) 08/26/2024    HGB 11.6 08/26/2024    HCT 37.0 08/26/2024    MCV 86 08/26/2024     (L) 08/26/2024    RBC 4.32 08/26/2024    MCH 26.9 08/26/2024    MCHC 31.4 08/26/2024    RDW 15.8 (H) 08/26/2024    MPV 10.9 08/26/2024    NRBC 0 08/26/2024     Imaging: I have personally reviewed pertinent reports.     Other Studies: no other studies

## 2024-08-26 NOTE — ASSESSMENT & PLAN NOTE
Wt Readings from Last 3 Encounters:   08/26/24 96.5 kg (212 lb 11.9 oz)   Most recent echocardiogram showed: Echocardiogram (6/14/2024): Left ventricular ejection fraction 60%.  Wall motion is normal.  Grade 2 diastolic dysfunction.  No significant valvular disease.  Left atrial dilation 35 to 41 mL/M2)   Continue on home metoprolol  In the setting of DANIELA with chronic diuretic use will trial Bumex to see response

## 2024-08-26 NOTE — UTILIZATION REVIEW
NOTIFICATION OF INPATIENT ADMISSION   AUTHORIZATION REQUEST   SERVICING FACILITY:   [facility]  Address: [address] [place] [address]  Tax ID: [id number]  NPI: [id number] ATTENDING PROVIDER:  Attending Name and NPI#: Estefania Banerjee Md [[id number]]  Address: [address] [place] [address]  Phone: [phone]   ADMISSION INFORMATION:  Place of Service: Inpatient [facility]  Place of Service Code: 21  Inpatient Admission Date/Time: 8/24/24  2:35 PM  Discharge Date/Time: No discharge date for patient encounter.  Admitting Diagnosis Code/Description:  Multiple fractures of ribs, bilateral, initial encounter for closed fracture [S22.43XA]  Other injury of unspecified body region, initial encounter [T14.8XXA]     UTILIZATION REVIEW CONTACT:  Sulaiman Ontiveros Utilization   Network Utilization Review Department  Phone: [phone]  Fax: [phone]  Email: [email or web address]  Contact for approvals/pending authorizations, clinical reviews, and discharge.     PHYSICIAN ADVISORY SERVICES:  Medical Necessity Denial & Yroy-cl-Oygs Review  Phone: [phone]  Fax: [phone]  Email: [email or web address]     DISCHARGE SUPPORT TEAM:  For Patients Discharge Needs & Updates  Phone: [phone] opt. 2 Fax: [phone]  Email: [email or web address]

## 2024-08-27 NOTE — UTILIZATION REVIEW
NOTIFICATION OF ADMISSION DISCHARGE   This is a Notification of Discharge from [facility]. Please be advised that this patient has been discharge from our facility. Below you will find the admission and discharge date and time including the patient’s disposition.   UTILIZATION REVIEW CONTACT:  Sulaiman Ontiveros  Utilization   Network Utilization Review Department  Phone: [phone] x carefully listen to the prompts. All voicemails are confidential.  Email: [email or web address]     ADMISSION INFORMATION  PRESENTATION DATE: 8/24/2024  1:12 PM  OBERVATION ADMISSION DATE: N/A  INPATIENT ADMISSION DATE: 8/24/24  2:35 PM   DISCHARGE DATE: 8/26/2024  4:32 PM   DISPOSITION:Home/Self Care    Network Utilization Review Department  ATTENTION: Please call with any questions or concerns to [phone] and carefully listen to the prompts so that you are directed to the right person. All voicemails are confidential.   For Discharge needs, contact Care Management DC Support Team at [phone] opt. 2  Send all requests for admission clinical reviews, approved or denied determinations and any other requests to dedicated fax number below belonging to the campus where the patient is receiving treatment. List of dedicated fax numbers for the Facilities:  FACILITY NAME UR FAX NUMBER   ADMISSION DENIALS (Administrative/Medical Necessity) [phone]   DISCHARGE SUPPORT TEAM ([facility]) [phone]   PARENT CHILD HEALTH (Maternity/NICU/Pediatrics) [phone]   [facility] [phone]   [facility] [phone]   formerly [facility] [phone]   [facility] [phone]   [facility] [phone]   [facility] [phone]   [facility] [phone]   [facility] [phone]   [facility]  [facility] [phone]   [facility] [phone]   [facility] [phone]   [facility] [phone]

## 2024-09-25 ENCOUNTER — OFFICE VISIT (OUTPATIENT)
Dept: SURGERY | Facility: CLINIC | Age: 59
End: 2024-09-25
Payer: COMMERCIAL

## 2024-09-25 VITALS
TEMPERATURE: 97.2 F | HEART RATE: 78 BPM | SYSTOLIC BLOOD PRESSURE: 158 MMHG | BODY MASS INDEX: 33.59 KG/M2 | OXYGEN SATURATION: 98 % | DIASTOLIC BLOOD PRESSURE: 100 MMHG | WEIGHT: 214 LBS | HEIGHT: 67 IN | RESPIRATION RATE: 16 BRPM

## 2024-09-25 DIAGNOSIS — I50.32 CHRONIC HEART FAILURE WITH PRESERVED EJECTION FRACTION (HCC): ICD-10-CM

## 2024-09-25 DIAGNOSIS — K44.9 HIATAL HERNIA: ICD-10-CM

## 2024-09-25 DIAGNOSIS — N18.4 BENIGN HYPERTENSION WITH CKD (CHRONIC KIDNEY DISEASE) STAGE IV (HCC): ICD-10-CM

## 2024-09-25 DIAGNOSIS — I12.9 BENIGN HYPERTENSION WITH CKD (CHRONIC KIDNEY DISEASE) STAGE IV (HCC): ICD-10-CM

## 2024-09-25 DIAGNOSIS — I48.0 PAROXYSMAL A-FIB (HCC): ICD-10-CM

## 2024-09-25 DIAGNOSIS — K21.9 GASTROESOPHAGEAL REFLUX DISEASE WITHOUT ESOPHAGITIS: Primary | Chronic | ICD-10-CM

## 2024-09-25 PROCEDURE — 99204 OFFICE O/P NEW MOD 45 MIN: CPT | Performed by: SURGERY
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
## 2024-09-25 NOTE — ASSESSMENT & PLAN NOTE
I reviewed her endoscopy from December which shows some esophagitis and biopsy confirmed Thayer's.  She is due for repeat endoscopy in 1 year.  I will refer back to gastroenterology for her repeat endoscopy.  She also is taking her Protonix once a day and could consider starting an H2 blocker in addition to help with her symptoms.  I did discuss the importance of cessation of smoking and reflux disease.  Discussed also medical strategies to help her symptoms.    Orders:    Esoph manometry/24hr ph; Future    Ambulatory Referral to Gastroenterology; Future

## 2024-09-25 NOTE — ASSESSMENT & PLAN NOTE
Wt Readings from Last 3 Encounters:   09/25/24 97.1 kg (214 lb)   08/26/24 96.5 kg (212 lb 11.9 oz)   07/18/24 100 kg (221 lb)     She has a significant cardiac history and will need to be evaluated by cardiology prior to any surgical intervention for restratification.

## 2024-09-25 NOTE — ASSESSMENT & PLAN NOTE
Lab Results   Component Value Date    EGFR 25 08/26/2024    EGFR 21 08/25/2024    EGFR 20 08/25/2024    CREATININE 2.05 (H) 08/26/2024    CREATININE 2.36 (H) 08/25/2024    CREATININE 2.49 (H) 08/25/2024

## 2024-09-25 NOTE — ASSESSMENT & PLAN NOTE
I reviewed her CT scan she had a result of her fall with rib fractures.  It does show a small hiatal hernia.  I explained the normal anatomy and symptoms that correlate with hiatal hernia and reflux disease.  I would like to get esophageal manometry as well as 24-hour pH.  She has some symptoms of dysphagia we will check manometry to check her esophageal motility.  I also will obtain a 24-hour pH to help correlate her symptoms and also severity of her acid reflux.  She is at moderate risk at least for any surgical intervention and may benefit from more medical management.  However I will wait for her testing and her repeat endoscopy and also evaluation by cardiology.  I will see her back once her testing is done for discussion.

## 2024-09-27 ENCOUNTER — OFFICE VISIT (OUTPATIENT)
Dept: GASTROENTEROLOGY | Facility: CLINIC | Age: 59
End: 2024-09-27
Payer: COMMERCIAL

## 2024-09-27 ENCOUNTER — TELEPHONE (OUTPATIENT)
Dept: GASTROENTEROLOGY | Facility: CLINIC | Age: 59
End: 2024-09-27

## 2024-09-27 VITALS
HEART RATE: 62 BPM | WEIGHT: 212.8 LBS | BODY MASS INDEX: 33.4 KG/M2 | HEIGHT: 67 IN | SYSTOLIC BLOOD PRESSURE: 137 MMHG | DIASTOLIC BLOOD PRESSURE: 87 MMHG

## 2024-09-27 DIAGNOSIS — K21.9 GASTROESOPHAGEAL REFLUX DISEASE WITHOUT ESOPHAGITIS: Chronic | ICD-10-CM

## 2024-09-27 DIAGNOSIS — R13.19 ESOPHAGEAL DYSPHAGIA: ICD-10-CM

## 2024-09-27 DIAGNOSIS — R10.12 LUQ PAIN: ICD-10-CM

## 2024-09-27 DIAGNOSIS — B18.2 CHRONIC HEPATITIS C WITHOUT HEPATIC COMA (HCC): ICD-10-CM

## 2024-09-27 DIAGNOSIS — K22.70 BARRETT'S ESOPHAGUS WITHOUT DYSPLASIA: ICD-10-CM

## 2024-09-27 DIAGNOSIS — K64.9 HEMORRHOIDS, UNSPECIFIED HEMORRHOID TYPE: ICD-10-CM

## 2024-09-27 DIAGNOSIS — K59.00 CONSTIPATION, UNSPECIFIED CONSTIPATION TYPE: ICD-10-CM

## 2024-09-27 DIAGNOSIS — Z80.0 FAMILY HISTORY OF COLON CANCER: ICD-10-CM

## 2024-09-27 DIAGNOSIS — R11.2 NAUSEA AND VOMITING, UNSPECIFIED VOMITING TYPE: Primary | ICD-10-CM

## 2024-09-27 DIAGNOSIS — R79.89 ELEVATED LFTS: ICD-10-CM

## 2024-09-27 PROCEDURE — 99214 OFFICE O/P EST MOD 30 MIN: CPT | Performed by: NURSE PRACTITIONER

## 2024-09-27 RX ORDER — POLYETHYLENE GLYCOL 3350, SODIUM CHLORIDE, SODIUM BICARBONATE, POTASSIUM CHLORIDE 420; 11.2; 5.72; 1.48 G/4L; G/4L; G/4L; G/4L
4000 POWDER, FOR SOLUTION ORAL ONCE
Qty: 4000 ML | Refills: 0 | Status: SHIPPED | OUTPATIENT
Start: 2024-09-27 | End: 2024-09-27

## 2024-09-27 RX ORDER — PANTOPRAZOLE SODIUM 40 MG/1
40 TABLET, DELAYED RELEASE ORAL DAILY
Qty: 30 TABLET | Refills: 5 | Status: SHIPPED | OUTPATIENT
Start: 2024-09-27

## 2024-09-27 RX ORDER — FAMOTIDINE 40 MG/1
40 TABLET, FILM COATED ORAL
Qty: 30 TABLET | Refills: 5 | Status: SHIPPED | OUTPATIENT
Start: 2024-09-27

## 2024-09-27 RX ORDER — FAMOTIDINE 40 MG/1
40 TABLET, FILM COATED ORAL
Qty: 30 TABLET | Refills: 5 | Status: SHIPPED | OUTPATIENT
Start: 2024-09-27 | End: 2024-09-27

## 2024-09-27 RX ORDER — HYDROCORTISONE ACETATE 25 MG/1
25 SUPPOSITORY RECTAL 2 TIMES DAILY
Qty: 12 SUPPOSITORY | Refills: 0 | Status: SHIPPED | OUTPATIENT
Start: 2024-09-27

## 2024-09-27 NOTE — TELEPHONE ENCOUNTER
Scheduled date of EGD/colonoscopy (as of today): 12/3/24  Physician performing EGD/colonoscopy: Dr. Workman  Location of EGD/colonoscopy:   Desired bowel prep reviewed with patient: 2 day golpricila  Instructions reviewed with patient by: tl given at appt  Clearances:      Taking Jadiance but says not diabetic  Jardiance- 4 day hold    Xarelto- 2 day hold  Either DR. Fredis Diamond or PCP prescribes.

## 2024-09-27 NOTE — PATIENT INSTRUCTIONS
-Get blood work done  -Schedule US elastography and upper GI series  -EGD is scheduled  -You need to have colonoscopy with 2 day prep for screening as you are high risk of colon cancer      NUTRITION RECOMMENDATIONS FOR PATIENTS WITH   DELAYED GASTRIC EMPTYING **    Nutrition education and diet modifications are important factors for controlling the symptoms of chronic nausea and vomiting associated with impaired stomach emptying. Maintaining good nutrition can be achieved with modest changes in your diet while at the same time help to reduce symptoms.     Basic Points to Remember:    1. Eat smaller portions. The greater the meal volume, the slower the stomach empties. Eat smaller meals more frequently for easier digestion.  2. Sit up or stand during eating and after meals. Body positioning during meals is very important. Avoid lying down while eating. Try to sit up or stand and walk around during and at least 3 hours after meals.   3. Choose liquid or pureed foods should empty even on a bad day. Liquid food exits the stomach more easily than solids. Switch to a liquid diet or pureed/ground foods if necessary. The same thing can be accomplished in most patients my thoroughly chewing your food. Drink caloric drinks rather than water (e.g. Peach/pear nectar, cranberry juice, Gator Aide, soup broth, Aryan-Aid, etc.).  4. Be aware of side effects from medications. Some medications may irritate the stomach or cause further delay of your stomach emptying. Either can lead to nausea and vomiting. Ask your doctor if you are currently taking any medications that may be causing chronic nausea and/or vomiting.  5. If you have diabetes, the most important thing to do is to control your glucose levels. Elevated glucose levels >200 mg/dL can delay stomach emptying in healthy people. If you have diabetes, take frequent glucose measurements and make insulin adjustments as needed for glucose control.  6. Avoid excess fiber intake.  "Some fiber is important to maintain normal intestinal functions. On the other hand, a high-fiber diet slows stomach emptying and increases the presence of food residue in the stomach. Avoid foods like oranges, berries, green beans, figs, skin on apples, potato peels, broccoli, cauliflower, and lettuce. Most fruits and vegetables can be digested successfully if they are smaller than 1/4 inch. Again, chewing and cutting up your food and eating with plenty of liquid is important. Avoid excess high-fiber supplements such as Metamucil, Citrucel, etc.  7. Avoid high-fat foods. Fat slows the exit of food from the stomach. A low-fat diet is recommended; however, some liquid containing fat can be a good source of calories.  8. Take a daily multi-vitamin supplement. A multi-vitamin supplement (specifically vitamins A and C, and the mineral iron) should be considered.  9. Eat nutritious foods. Eat nutritiously before filling up on empty calories found in foods such as cakes, candy and pastries.  10. Be aware of \"trigger foods\". Some foods may cause symptoms more readily than others. Take note of these foods and avoid them if possible.     Recommended Foods:    Skim milk, low-fat yogurt, low-fat milkshakes, low-fat cheeses, hot cereals (cream of wheat, grits).  Fat-free soups and bouillon with noodles and vegetables.  Fruit juice, canned fruits without skin (applesauce, peaches, pears); add honey or syrup for extra calories.  Eggs, peanut butter.  Meats, fish, poultry; mix with broth, water, vegetable or V-8 juice.  Low-grain bread and cereals, pasta, rice, crackers.  Vegetable juices, cooked vegetables without skins (beets, carrots, mushrooms, potatoes, including mashed potatoes)  Tea, water, coffee, soda    ** These recommendations have been modified from a publication from the [employer]. It was written by the following authors:  Betty ARANGO, Jaqueline VELASQUEZ, [place] SHERIDAN, Terrence " CHERYL
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
## 2024-10-03 ENCOUNTER — TELEPHONE (OUTPATIENT)
Dept: GASTROENTEROLOGY | Facility: HOSPITAL | Age: 59
End: 2024-10-03

## 2024-10-08 ENCOUNTER — HOSPITAL ENCOUNTER (OUTPATIENT)
Dept: GASTROENTEROLOGY | Facility: HOSPITAL | Age: 59
Discharge: HOME | End: 2024-10-08
Attending: SURGERY
Payer: COMMERCIAL

## 2024-10-08 VITALS
SYSTOLIC BLOOD PRESSURE: 127 MMHG | OXYGEN SATURATION: 97 % | HEART RATE: 70 BPM | RESPIRATION RATE: 16 BRPM | DIASTOLIC BLOOD PRESSURE: 74 MMHG | TEMPERATURE: 98.5 F

## 2024-10-08 DIAGNOSIS — K21.9 GASTROESOPHAGEAL REFLUX DISEASE WITHOUT ESOPHAGITIS: Chronic | ICD-10-CM

## 2024-10-08 PROCEDURE — 91038 ESOPH IMPED FUNCT TEST > 1HR: CPT | Performed by: INTERNAL MEDICINE

## 2024-10-08 PROCEDURE — 91010 ESOPHAGUS MOTILITY STUDY: CPT

## 2024-10-08 PROCEDURE — 91038 ESOPH IMPED FUNCT TEST > 1HR: CPT

## 2024-10-08 PROCEDURE — 91010 ESOPHAGUS MOTILITY STUDY: CPT | Performed by: INTERNAL MEDICINE

## 2024-10-08 NOTE — PERIOPERATIVE NURSING NOTE
Patient brought in the room and explained the esophageal manometry procedure. After the confirmation of allergies, Lidocaine 2% viscous solution given via right/ left nostrils and  a transnasal insertion of the High Resolution esophageal manometry catheter was inserted via left nostril. Patient given water to drink during the insertion and once the catheter inserted pressure bands of both Upper esophageal sphincter  (UES) and Lower esophageal sphincter ( LES) were observed on the color contour. Patient instructed to take a deep breath to verify placement of the catheter, diaphragmatic pinch noted on inspiration. Catheter was secured to left cheek. Patient was assisted to supine position .Patient was instructed to relax  while acclimating the catheter for about 5 minutes. A 30 second baseline resting pressure was obtained to identify the UES and LES followed by a series of 10 liquid swallows using 5 cc room temperature normal saline to assess esophageal motility and bolus transit. Patient administered 10 viscous swallows using 5 cc viscous solution, 1 multiple rapid drink swallow using 2 cc room temperature normal saline given a total of 5 drinks. Patient instructed to sit up at the edge of the stretcher and given 5 upright liquid swallows using 5 cc room temperature normal saline and 1 rapid drink challenge using 100 cc room temperature water. At the end of the procedure the high resolution esophageal manometry catheter was removed from the nostril intact. Dual sensor PH probe inserted via left nostril and secured. Zephr recorder teachback performed and patient verbalized understanding. Patient instructed to return next day to have probe remove. Discharge instructions given and patient ambulated out of room in stable condition.

## 2024-10-22 ENCOUNTER — TELEPHONE (OUTPATIENT)
Dept: SURGERY | Facility: CLINIC | Age: 59
End: 2024-10-22

## 2024-10-22 NOTE — TELEPHONE ENCOUNTER
Called and left the patient a message inquiring if she had her endoscopy scheduled after seeing the results of her recent 24 hour Esophageal manometry.

## 2024-10-23 LAB
ATRIAL RATE: 267 BPM
ATRIAL RATE: 277 BPM
P AXIS: 180 DEGREES
P AXIS: 193 DEGREES
QRS AXIS: -81 DEGREES
QRS AXIS: -83 DEGREES
QRSD INTERVAL: 184 MS
QRSD INTERVAL: 202 MS
QT INTERVAL: 488 MS
QT INTERVAL: 514 MS
QTC INTERVAL: 491 MS
QTC INTERVAL: 573 MS
T WAVE AXIS: 88 DEGREES
T WAVE AXIS: 91 DEGREES
VENTRICULAR RATE: 61 BPM
VENTRICULAR RATE: 75 BPM

## 2024-11-05 ENCOUNTER — TELEPHONE (OUTPATIENT)
Dept: GASTROENTEROLOGY | Facility: CLINIC | Age: 59
End: 2024-11-05

## 2024-11-06 ENCOUNTER — TELEPHONE (OUTPATIENT)
Dept: CARDIOLOGY CLINIC | Facility: CLINIC | Age: 59
End: 2024-11-06

## 2024-11-06 NOTE — TELEPHONE ENCOUNTER
Pt is scheduled for Colon/ EGD on 12/3/24.  [facility] would like a 2 day hold of Xarelto prior to procedure.      Please advise

## 2024-11-07 NOTE — TELEPHONE ENCOUNTER
Routed to [facility]/ [place] # [phone].

## 2024-12-03 ENCOUNTER — TELEPHONE (OUTPATIENT)
Dept: CARDIOLOGY CLINIC | Facility: CLINIC | Age: 59
End: 2024-12-03

## 2024-12-03 NOTE — TELEPHONE ENCOUNTER
Left voicemail message for patient request call back to review status of ICD device monitor and if patient followed up with [employer]

## 2024-12-18 ENCOUNTER — OFFICE VISIT (OUTPATIENT)
Dept: SURGERY | Facility: CLINIC | Age: 59
End: 2024-12-18
Payer: COMMERCIAL

## 2024-12-18 ENCOUNTER — TELEPHONE (OUTPATIENT)
Age: 59
End: 2024-12-18

## 2024-12-18 VITALS
OXYGEN SATURATION: 99 % | RESPIRATION RATE: 16 BRPM | SYSTOLIC BLOOD PRESSURE: 172 MMHG | HEIGHT: 67 IN | WEIGHT: 205 LBS | DIASTOLIC BLOOD PRESSURE: 102 MMHG | BODY MASS INDEX: 32.18 KG/M2 | TEMPERATURE: 97.6 F | HEART RATE: 77 BPM

## 2024-12-18 DIAGNOSIS — Z72.0 TOBACCO ABUSE: ICD-10-CM

## 2024-12-18 DIAGNOSIS — K21.9 GASTROESOPHAGEAL REFLUX DISEASE WITHOUT ESOPHAGITIS: Chronic | ICD-10-CM

## 2024-12-18 DIAGNOSIS — I48.0 PAROXYSMAL A-FIB (HCC): Primary | ICD-10-CM

## 2024-12-18 DIAGNOSIS — K44.9 HIATAL HERNIA: ICD-10-CM

## 2024-12-18 DIAGNOSIS — I5A NONISCHEMIC NONTRAUMATIC MYOCARDIAL INJURY: ICD-10-CM

## 2024-12-18 PROCEDURE — 99213 OFFICE O/P EST LOW 20 MIN: CPT | Performed by: SURGERY
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
## 2024-12-18 NOTE — ASSESSMENT & PLAN NOTE
I reviewed her 24-hour pH monitoring and esophageal manometry.  Her esophageal manometry was normal.  And her 24-hour pH was consistent with elevated acid with an elevated DeMeester score.  However when you look at the symptoms and symptom correlation of her symptoms did not correlate to acid.  Her symptoms of burning had excellent correlation to acid and explained that the symptoms will improve and resolve with hernia surgery.  However the vomiting and the gagging and choking were not related to acid and will not change with an antireflux procedure.  This is why stressed importance of her getting her upper GI to see dynamic swallowing and also to have her endoscopy to ensure there is no evidence of esophageal stricture because of uncontrolled acid.  Once these are complete can then follow-up for plans for possible repair of her hernia with a fundoplication.  I also reordered her upper GI and help get it scheduled.  Will also help reach out back to her gastroenterologist for scheduling of her EGD.

## 2024-12-18 NOTE — ASSESSMENT & PLAN NOTE
She is still prescribed Xarelto and explained she needs to stay on this per her cardiologist.  Prior to surgery she would likely need to be reevaluated by cardiology for restratification and approval to hold Xarelto she was cleared for an endoscopy but may need evaluation for a larger surgery.

## 2024-12-18 NOTE — TELEPHONE ENCOUNTER
Scheduled date of EGD/colonoscopy (as of today): 1/30/2025  Physician performing EGD/colonoscopy: Dr. Workman  Location of EGD/colonoscopy:   Desired bowel prep reviewed with patient: 2 day golytely  Instructions reviewed with patient by: ROQUE- 2 day golytely prep instructions sent to [facility]  Clearances:Taking Jadiance but says not diabetic  Jardiance- 4 day hold   Xarelto- 2 day hold

## 2024-12-18 NOTE — LETTER
Hello,    Attached are your prep instructions for your upcoming procedure on 1/30/2025. If you have any questions, please give us a call at [phone].    Thank you,    [facility]    Medicine Instructions for Adults with Diabetes who Need a Bowel Prep       Follow these instructions when a BOWEL PREP is required for your procedure or surgery!    NOTE:   GLP-1 Agonists taken weekly: do not take in the 7 days before your procedure   SGLT-2 Inhibitors: do not take in the 4 days before your procedure     On the Day Before Surgery/Procedure  If you are having a procedure (e.g. Colonoscopy) or surgery that requires a bowel prep and you may have at least a clear liquid diet, follow the directions below based on the type of medicine you take for your diabetes.     Type of Medicine You Take Examples What to do   Pre-Mixed Insulin - Intermediate Acting Humalog® 75/25, Humulin® 70/30, Novolog® 70/30, Regular Insulin Take ½ your regular dose the evening before your procedure   Rapid/Fast Acting Insulin Humalog® U200, NovoLog®, Apidra®, Fiasp® Take ½ your regular dose the evening before your procedure.   Long-Acting Insulin Lantus®, Levemir®, Tresiba®, Toujeo®, Basaglar® Take your FULL regular dose the day before procedure   Oral Sulfonylurea Glipizide/Glimepiride/Glucotrol® Take ½ your regular dose the evening before your procedure   Other Oral Diabetes Medicines Metformin®, Glucophage®, Glucophage XR®, Riomet®, Glumetza®), Actose®, Avandia®, Glyset®, Prandin® Take your regular dose with dinner in the evening before your procedure   GLP-1 Agonists AdlyxinÒ, ByettaÒ, BydureonÒ, OzempicÒ, SoliquaÒ, TanzeumÒ, TrulicityÒ, VictozaÒ, Saxenda®, Rybelsus® If taken daily, take as normal    If taken weekly, do not take this medicine for 7 days before your procedure including the day of the procedure (resume taking after the procedure)   SGLT-2 Inhibitors Jardiance®, Invokana®, Farxiga®,    Steglatro®, Brenzavvy®, Qtern®, Segluromet®, Glyxambi®, Synjardy®, Synjardy XR®, Invokamet®, Invokamet XR®, Trijary XR®, Xigduo XR®, Steglujan® Do not take for 4 days before your procedure including the day of the procedure (resume taking after the procedure)                More information continued on back                    Medicine Instructions for Adults with Diabetes who Need a Bowel Prep  Page 2      On the Day of Surgery/Procedure  Follow the directions below based on the type of medicine you take for your diabetes.     Type of Medicine You Take Examples What to do   Long-Acting Insulin Lantus®, Levemir®, Tresiba®, Toujeo®, Basaglar®, Semglee®   If you usually take your Long-Acting Insulin in the morning, take the full dose as scheduled.   GLP-1 Agonists AdlyxinÒ, ByettaÒ, BydureonÒ, OzempicÒ, SoliquaÒ, TanzeumÒ, TrulicityÒ, VictozaÒ, Saxenda®, Rybelsus® Do NOT take this medicine on the day of your procedure (resume taking after the procedure)       On the Day of Surgery/Procedure (continued)  Except for the morning Long-Acting Insulin, DO NOT take ANY diabetic medicine on the day of your procedure unless you were instructed by the doctor who manages your diabetes medicines.    Continue to check your blood sugars.  If you have an insulin pump, ask your endocrinologist for instructions at least 3 days before your procedure. NOTE: If you are not able to ask your endocrinologist in advance, on the day of the procedure set your insulin pump to your basal rate only. Bring your insulin pump supplies to the hospital.     If you have any questions about taking your diabetes medicines prior to your procedure, please contact the doctor who manages your diabetes medicines.

## 2024-12-23 ENCOUNTER — HOSPITAL ENCOUNTER (EMERGENCY)
Facility: HOSPITAL | Age: 59
Discharge: HOME | End: 2024-12-23
Attending: EMERGENCY MEDICINE
Payer: COMMERCIAL

## 2024-12-23 ENCOUNTER — APPOINTMENT (EMERGENCY)
Dept: RADIOLOGY | Facility: HOSPITAL | Age: 59
End: 2024-12-23
Payer: COMMERCIAL

## 2024-12-23 VITALS
RESPIRATION RATE: 20 BRPM | OXYGEN SATURATION: 97 % | SYSTOLIC BLOOD PRESSURE: 148 MMHG | HEART RATE: 60 BPM | DIASTOLIC BLOOD PRESSURE: 72 MMHG | TEMPERATURE: 98.1 F

## 2024-12-23 DIAGNOSIS — S70.02XA HEMATOMA OF LEFT HIP, INITIAL ENCOUNTER: Primary | ICD-10-CM

## 2024-12-23 DIAGNOSIS — S90.511A ABRASION, RIGHT ANKLE, INITIAL ENCOUNTER: ICD-10-CM

## 2024-12-23 PROCEDURE — 73502 X-RAY EXAM HIP UNI 2-3 VIEWS: CPT

## 2024-12-23 PROCEDURE — 99284 EMERGENCY DEPT VISIT MOD MDM: CPT | Performed by: EMERGENCY MEDICINE

## 2024-12-23 PROCEDURE — 99284 EMERGENCY DEPT VISIT MOD MDM: CPT

## 2024-12-23 RX ORDER — ACETAMINOPHEN 325 MG/1
975 TABLET ORAL ONCE
Status: COMPLETED | OUTPATIENT
Start: 2024-12-23 | End: 2024-12-23

## 2024-12-23 RX ADMIN — ACETAMINOPHEN 975 MG: 325 TABLET, FILM COATED ORAL at 20:18
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
## 2025-01-09 ENCOUNTER — RESULTS FOLLOW-UP (OUTPATIENT)
Dept: GASTROENTEROLOGY | Facility: CLINIC | Age: 60
End: 2025-01-09

## 2025-01-09 ENCOUNTER — HOSPITAL ENCOUNTER (OUTPATIENT)
Dept: RADIOLOGY | Facility: HOSPITAL | Age: 60
Discharge: HOME | End: 2025-01-09
Payer: COMMERCIAL

## 2025-01-09 DIAGNOSIS — R13.19 ESOPHAGEAL DYSPHAGIA: ICD-10-CM

## 2025-01-09 DIAGNOSIS — R11.2 NAUSEA AND VOMITING, UNSPECIFIED VOMITING TYPE: ICD-10-CM

## 2025-01-09 PROCEDURE — 74246 X-RAY XM UPR GI TRC 2CNTRST: CPT

## 2025-01-09 PROCEDURE — 74248 X-RAY SM INT F-THRU STD: CPT

## 2025-01-16 DIAGNOSIS — Z80.0 FAMILY HISTORY OF COLON CANCER: ICD-10-CM

## 2025-01-16 RX ORDER — POLYETHYLENE GLYCOL 3350, SODIUM CHLORIDE, SODIUM BICARBONATE, POTASSIUM CHLORIDE 420; 11.2; 5.72; 1.48 G/4L; G/4L; G/4L; G/4L
4000 POWDER, FOR SOLUTION ORAL ONCE
Qty: 4000 ML | Refills: 0 | Status: SHIPPED | OUTPATIENT
Start: 2025-01-16 | End: 2025-01-16

## 2025-01-30 ENCOUNTER — ANESTHESIA EVENT (OUTPATIENT)
Dept: GASTROENTEROLOGY | Facility: HOSPITAL | Age: 60
End: 2025-01-30
Payer: COMMERCIAL

## 2025-01-30 ENCOUNTER — HOSPITAL ENCOUNTER (OUTPATIENT)
Dept: GASTROENTEROLOGY | Facility: HOSPITAL | Age: 60
Setting detail: OUTPATIENT SURGERY
End: 2025-01-30
Payer: COMMERCIAL

## 2025-01-30 ENCOUNTER — ANESTHESIA (OUTPATIENT)
Dept: GASTROENTEROLOGY | Facility: HOSPITAL | Age: 60
End: 2025-01-30
Payer: COMMERCIAL

## 2025-01-30 VITALS
HEIGHT: 67 IN | WEIGHT: 209.1 LBS | HEART RATE: 61 BPM | OXYGEN SATURATION: 98 % | BODY MASS INDEX: 32.82 KG/M2 | TEMPERATURE: 97.8 F | RESPIRATION RATE: 16 BRPM | DIASTOLIC BLOOD PRESSURE: 76 MMHG | SYSTOLIC BLOOD PRESSURE: 160 MMHG

## 2025-01-30 DIAGNOSIS — R11.2 NAUSEA AND VOMITING, UNSPECIFIED VOMITING TYPE: ICD-10-CM

## 2025-01-30 DIAGNOSIS — K21.9 GASTROESOPHAGEAL REFLUX DISEASE WITHOUT ESOPHAGITIS: ICD-10-CM

## 2025-01-30 DIAGNOSIS — K22.70 BARRETT'S ESOPHAGUS WITHOUT DYSPLASIA: ICD-10-CM

## 2025-01-30 DIAGNOSIS — K21.9 GASTROESOPHAGEAL REFLUX DISEASE WITHOUT ESOPHAGITIS: Chronic | ICD-10-CM

## 2025-01-30 DIAGNOSIS — Z80.0 FAMILY HISTORY OF COLON CANCER: ICD-10-CM

## 2025-01-30 PROCEDURE — 45385 COLONOSCOPY W/LESION REMOVAL: CPT | Performed by: INTERNAL MEDICINE

## 2025-01-30 PROCEDURE — 88305 TISSUE EXAM BY PATHOLOGIST: CPT | Performed by: STUDENT IN AN ORGANIZED HEALTH CARE EDUCATION/TRAINING PROGRAM

## 2025-01-30 PROCEDURE — 43239 EGD BIOPSY SINGLE/MULTIPLE: CPT | Performed by: INTERNAL MEDICINE

## 2025-01-30 PROCEDURE — 45380 COLONOSCOPY AND BIOPSY: CPT | Performed by: INTERNAL MEDICINE

## 2025-01-30 RX ORDER — PROPOFOL 10 MG/ML
INJECTION, EMULSION INTRAVENOUS AS NEEDED
Status: DISCONTINUED | OUTPATIENT
Start: 2025-01-30 | End: 2025-01-30

## 2025-01-30 RX ORDER — LIDOCAINE HYDROCHLORIDE 20 MG/ML
INJECTION, SOLUTION EPIDURAL; INFILTRATION; INTRACAUDAL; PERINEURAL AS NEEDED
Status: DISCONTINUED | OUTPATIENT
Start: 2025-01-30 | End: 2025-01-30

## 2025-01-30 RX ORDER — SODIUM CHLORIDE, SODIUM LACTATE, POTASSIUM CHLORIDE, CALCIUM CHLORIDE 600; 310; 30; 20 MG/100ML; MG/100ML; MG/100ML; MG/100ML
INJECTION, SOLUTION INTRAVENOUS CONTINUOUS PRN
Status: DISCONTINUED | OUTPATIENT
Start: 2025-01-30 | End: 2025-01-30

## 2025-01-30 RX ADMIN — PROPOFOL 50 MG: 10 INJECTION, EMULSION INTRAVENOUS at 13:14

## 2025-01-30 RX ADMIN — PROPOFOL 50 MG: 10 INJECTION, EMULSION INTRAVENOUS at 13:19

## 2025-01-30 RX ADMIN — PROPOFOL 50 MG: 10 INJECTION, EMULSION INTRAVENOUS at 13:32

## 2025-01-30 RX ADMIN — Medication 40 MG: at 13:19

## 2025-01-30 RX ADMIN — LIDOCAINE HYDROCHLORIDE 100 MG: 20 INJECTION, SOLUTION EPIDURAL; INFILTRATION; INTRACAUDAL; PERINEURAL at 13:04

## 2025-01-30 RX ADMIN — PROPOFOL 50 MG: 10 INJECTION, EMULSION INTRAVENOUS at 13:28

## 2025-01-30 RX ADMIN — PROPOFOL 50 MG: 10 INJECTION, EMULSION INTRAVENOUS at 13:06

## 2025-01-30 RX ADMIN — PROPOFOL 50 MG: 10 INJECTION, EMULSION INTRAVENOUS at 13:09

## 2025-01-30 RX ADMIN — PROPOFOL 50 MG: 10 INJECTION, EMULSION INTRAVENOUS at 13:23

## 2025-01-30 RX ADMIN — SODIUM CHLORIDE, SODIUM LACTATE, POTASSIUM CHLORIDE, AND CALCIUM CHLORIDE: .6; .31; .03; .02 INJECTION, SOLUTION INTRAVENOUS at 13:02

## 2025-01-30 RX ADMIN — PROPOFOL 100 MG: 10 INJECTION, EMULSION INTRAVENOUS at 13:04

## 2025-01-30 NOTE — ANESTHESIA PREPROCEDURE EVALUATION
Procedure:  EGD  COLONOSCOPY    Relevant Problems   CARDIO   (+) Atrial flutter (HCC)   (+) Chest pain   (+) Essential hypertension   (+) HTN (hypertension)   (+) Mild acute on chronic diastolic congestive heart failure (HCC)   (+) Mixed hyperlipidemia   (+) Paroxysmal A-fib (HCC)      GI/HEPATIC   (+) BRBPR (bright red blood per rectum)   (+) Gastroesophageal reflux disease    (+) Hepatitis C   (+) Hiatal hernia      /RENAL   (+) Acute-on-chronic kidney injury  (HCC)   (+) Benign hypertension with CKD (chronic kidney disease) stage IV (HCC)   (+) Renal cyst   (+) Right renal stone   (+) Stage 4 chronic kidney disease (HCC)      HEMATOLOGY   (+) Anemia due to chronic kidney disease   (+) Anemia in stage 4 chronic kidney disease       MUSCULOSKELETAL   (+) Hiatal hernia   (+) Left low back pain   (+) Osteoarthritis   (+) Primary osteoarthritis of first carpometacarpal joint of left hand   (+) Sciatica      NEURO/PSYCH   (+) Headache      PULMONARY   (+) Obstructive sleep apnea, adult   (+) SOB (shortness of breath)      TTE 06/2024  Left Ventricle Left ventricular cavity size is normal. Wall thickness is increased. There is moderate to severe asymmetric hypertrophy. The left ventricular ejection fraction is 60%. Systolic function is mildly reduced. Wall motion is normal. Diastolic function is moderately abnormal, consistent with grade II (pseudonormal) relaxation.   Interventricular Septum There is abnormal septal motion consistent with right ventricular pacing.   Right Ventricle Right ventricular cavity size is normal. Systolic function is normal. Wall thickness is normal. A pacer wire is present.   Left Atrium The atrium is mildly dilated (35-41 mL/m2).   Right Atrium The atrium is mildly dilated. A pacer wire is present.   Aortic Valve The aortic valve is trileaflet. The leaflets are mildly thickened. The leaflets are moderately calcified. There is no evidence of regurgitation. There is aortic valve  sclerosis.The aortic valve velocity is increased due to increased flow. The highest velocity at rest was 2 m/s.   Mitral Valve Mitral valve structure is normal.  There is trace regurgitation. There is no evidence of stenosis.   Tricuspid Valve Tricuspid valve structure is normal. There is mild regurgitation. There is no evidence of stenosis. The estimated right ventricular systolic pressure is 53.00 mmHg.   Pulmonic Valve Pulmonic valve structure is normal. There is no evidence of regurgitation. There is no evidence of stenosis.   Ascending Aorta The aortic root is normal in size. The ascending aorta is normal in size. The aortic root is 3.40 cm. The ascending aorta is 3.6 cm.   IVC/SVC The right atrial pressure is estimated at 8.0 mmHg. The inferior vena cava is dilated. Respirophasic changes were normal.   Pericardium There is no pericardial effusion. The pericardium is normal in appearance.   Pulmonary Veins There is systolic blunting in the pulmonary veins.       Physical Exam    Airway    Mallampati score: II  TM Distance: >3 FB  Neck ROM: full     Dental        Cardiovascular  Rhythm: regular, Rate: normal    Pulmonary   Breath sounds clear to auscultation    Other Findings  Intercisor Distance > 3cm    post-pubertal.      Anesthesia Plan  ASA Score- 3     Anesthesia Type- IV sedation with anesthesia with ASA Monitors.         Additional Monitors:     Airway Plan:     Comment: NPO appropriate. Discussed benefits/risks of monitored anesthetic care which involves providing a dynamic level of mild to deep sedation. Complications include awareness and/or airway obstruction/aspiration which may necessitate conversion to general anesthesia. All questions answered. Patient understands and wishes to proceed. .       Plan Factors-Exercise tolerance (METS): >4 METS.    Chart reviewed. EKG reviewed.  Existing labs reviewed.                   Induction-     Postoperative Plan- Plan for postoperative opioid use.          Informed Consent- Anesthetic plan and risks discussed with patient.  I personally reviewed this patient with the CRNA. Discussed and agreed on the Anesthesia Plan with the CRNA..      NPO Status:  Vitals Value Taken Time   Date of last liquid 01/30/25 01/30/25 1223   Time of last liquid 0500 01/30/25 1223   Date of last solid 01/28/25 01/30/25 1223   Time of last solid 1800 01/30/25 1223
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
## 2025-02-03 PROCEDURE — 88305 TISSUE EXAM BY PATHOLOGIST: CPT | Performed by: STUDENT IN AN ORGANIZED HEALTH CARE EDUCATION/TRAINING PROGRAM

## 2025-02-08 DIAGNOSIS — E87.8 ELECTROLYTE ABNORMALITY: ICD-10-CM

## 2025-02-08 DIAGNOSIS — N18.32 STAGE 3B CHRONIC KIDNEY DISEASE (HCC): ICD-10-CM

## 2025-02-08 DIAGNOSIS — K21.9 GASTROESOPHAGEAL REFLUX DISEASE WITHOUT ESOPHAGITIS: Chronic | ICD-10-CM

## 2025-02-08 DIAGNOSIS — R11.2 NAUSEA AND VOMITING, UNSPECIFIED VOMITING TYPE: ICD-10-CM

## 2025-02-10 DIAGNOSIS — I10 PRIMARY HYPERTENSION: Primary | ICD-10-CM

## 2025-02-10 RX ORDER — PANTOPRAZOLE SODIUM 40 MG/1
TABLET, DELAYED RELEASE ORAL
Qty: 90 TABLET | Refills: 1 | Status: SHIPPED | OUTPATIENT
Start: 2025-02-10

## 2025-02-10 RX ORDER — RIVAROXABAN 15 MG/1
TABLET, FILM COATED ORAL
Qty: 90 TABLET | Refills: 1 | Status: SHIPPED | OUTPATIENT
Start: 2025-02-10

## 2025-02-10 RX ORDER — FAMOTIDINE 40 MG/1
TABLET, FILM COATED ORAL
Qty: 90 TABLET | Refills: 1 | Status: SHIPPED | OUTPATIENT
Start: 2025-02-10

## 2025-02-10 NOTE — TELEPHONE ENCOUNTER
Spoke with patient and told her medication has been ordered and that there are lab test ordered that she needs to have done. She verbalized understanding.

## 2025-02-11 ENCOUNTER — RESULTS FOLLOW-UP (OUTPATIENT)
Dept: GASTROENTEROLOGY | Facility: CLINIC | Age: 60
End: 2025-02-11

## 2025-02-11 RX ORDER — AMLODIPINE BESYLATE 5 MG/1
TABLET ORAL
Qty: 60 TABLET | Refills: 10 | Status: SHIPPED | OUTPATIENT
Start: 2025-02-11

## 2025-02-11 NOTE — RESULT ENCOUNTER NOTE
Please call the patient regarding results.  Biopsies were all benign.  No evidence of H. pylori.  Esophageal biopsies show Thayer's esophagus with no evidence of progression toward cancer.  Repeat EGD in 3 years.  Colon polyps were mostly hyperplastic with a single adenoma.  Repeat colonoscopy in 3 years.

## 2025-02-20 ENCOUNTER — RESULTS FOLLOW-UP (OUTPATIENT)
Dept: NON INVASIVE DIAGNOSTICS | Facility: HOSPITAL | Age: 60
End: 2025-02-20

## 2025-02-20 ENCOUNTER — IN-CLINIC DEVICE VISIT (OUTPATIENT)
Dept: CARDIOLOGY CLINIC | Facility: CLINIC | Age: 60
End: 2025-02-20
Payer: COMMERCIAL

## 2025-02-20 DIAGNOSIS — Z95.810 PRESENCE OF IMPLANTABLE CARDIOVERTER-DEFIBRILLATOR (ICD): ICD-10-CM

## 2025-02-20 DIAGNOSIS — I48.0 PAROXYSMAL A-FIB (HCC): ICD-10-CM

## 2025-02-20 DIAGNOSIS — Z86.79 HISTORY OF VENTRICULAR TACHYCARDIA: Primary | ICD-10-CM

## 2025-02-20 PROCEDURE — 93283 PRGRMG EVAL IMPLANTABLE DFB: CPT | Performed by: STUDENT IN AN ORGANIZED HEALTH CARE EDUCATION/TRAINING PROGRAM

## 2025-02-20 NOTE — PROGRESS NOTES
Results for orders placed or performed in visit on 02/20/25   Cardiac EP device report    Narrative    MDT-DUAL CHAMBER ICD (AAI-DDD MODE) - ACTIVE SYSTEM IS MRI CONDITIONAL  DEVICE INTERROGATED IN THE [place] OFFICE.  BATTERY VOLTAGE NEARING ARTUR.  WILL SCHEDULE MONTHLY BATTERY CHECKS (5 MTHS-NO REMOTES-PT TO COME TO THE OFFICE MONTHLY).  AP 38.5%   98.4%.   ALL LEAD PARAMETERS WITHIN NORMAL LIMITS.  NO SIGNIFICANT HIGH RATE EPISODES. 743 AT/AF EPISODES, LONGEST DURAITON >24 HRS.  PT TAKES XARELTO, METOPROLOL SUCC.  TIME IN AT/AF 57.7%.  OPTI-VOL WITHIN NORMAL LIMITS.  NO PROGRAMMING CHANGES MADE TO DEVICE PARAMETERS.  NORMAL DEVICE FUNCTION.  PAS

## 2025-02-27 ENCOUNTER — APPOINTMENT (EMERGENCY)
Dept: RADIOLOGY | Facility: HOSPITAL | Age: 60
End: 2025-02-27
Payer: COMMERCIAL

## 2025-02-27 ENCOUNTER — HOSPITAL ENCOUNTER (EMERGENCY)
Facility: HOSPITAL | Age: 60
Discharge: HOME | End: 2025-02-27
Attending: EMERGENCY MEDICINE | Admitting: EMERGENCY MEDICINE
Payer: COMMERCIAL

## 2025-02-27 VITALS
TEMPERATURE: 97.8 F | SYSTOLIC BLOOD PRESSURE: 159 MMHG | HEART RATE: 61 BPM | OXYGEN SATURATION: 97 % | DIASTOLIC BLOOD PRESSURE: 82 MMHG | RESPIRATION RATE: 24 BRPM

## 2025-02-27 DIAGNOSIS — M54.9 BACK PAIN: Primary | ICD-10-CM

## 2025-02-27 DIAGNOSIS — M54.30 SCIATICA: ICD-10-CM

## 2025-02-27 LAB
2HR DELTA HS TROPONIN: 11 NG/L
ALBUMIN SERPL BCG-MCNC: 4.1 G/DL (ref 3.5–5)
ALP SERPL-CCNC: 62 U/L (ref 34–104)
ALT SERPL W P-5'-P-CCNC: 8 U/L (ref 7–52)
ANION GAP SERPL CALCULATED.3IONS-SCNC: 11 MMOL/L (ref 4–13)
APTT PPP: 23 SECONDS (ref 23–34)
AST SERPL W P-5'-P-CCNC: 21 U/L (ref 13–39)
ATRIAL RATE: 300 BPM
BASOPHILS # BLD AUTO: 0.04 THOUSANDS/ÂΜL (ref 0–0.1)
BASOPHILS NFR BLD AUTO: 1 % (ref 0–1)
BILIRUB SERPL-MCNC: 0.39 MG/DL (ref 0.2–1)
BILIRUB UR QL STRIP: NEGATIVE
BUN SERPL-MCNC: 20 MG/DL (ref 5–25)
CALCIUM SERPL-MCNC: 9.3 MG/DL (ref 8.4–10.2)
CARDIAC TROPONIN I PNL SERPL HS: 62 NG/L (ref ?–50)
CARDIAC TROPONIN I PNL SERPL HS: 73 NG/L (ref ?–50)
CHLORIDE SERPL-SCNC: 106 MMOL/L (ref 96–108)
CLARITY UR: CLEAR
CO2 SERPL-SCNC: 20 MMOL/L (ref 21–32)
COLOR UR: COLORLESS
CREAT SERPL-MCNC: 2.43 MG/DL (ref 0.6–1.3)
EOSINOPHIL # BLD AUTO: 0.09 THOUSAND/ÂΜL (ref 0–0.61)
EOSINOPHIL NFR BLD AUTO: 1 % (ref 0–6)
ERYTHROCYTE [DISTWIDTH] IN BLOOD BY AUTOMATED COUNT: 14.1 % (ref 11.6–15.1)
GFR SERPL CREATININE-BSD FRML MDRD: 21 ML/MIN/1.73SQ M
GLUCOSE SERPL-MCNC: 97 MG/DL (ref 65–140)
GLUCOSE UR STRIP-MCNC: ABNORMAL MG/DL
HCT VFR BLD AUTO: 41.5 % (ref 34.8–46.1)
HGB BLD-MCNC: 13.6 G/DL (ref 11.5–15.4)
HGB UR QL STRIP.AUTO: NEGATIVE
IMM GRANULOCYTES # BLD AUTO: 0.03 THOUSAND/UL (ref 0–0.2)
IMM GRANULOCYTES NFR BLD AUTO: 0 % (ref 0–2)
INR PPP: 1.1 (ref 0.85–1.19)
KETONES UR STRIP-MCNC: NEGATIVE MG/DL
LEUKOCYTE ESTERASE UR QL STRIP: NEGATIVE
LIPASE SERPL-CCNC: 85 U/L (ref 11–82)
LYMPHOCYTES # BLD AUTO: 1.46 THOUSANDS/ÂΜL (ref 0.6–4.47)
LYMPHOCYTES NFR BLD AUTO: 19 % (ref 14–44)
MCH RBC QN AUTO: 28.2 PG (ref 26.8–34.3)
MCHC RBC AUTO-ENTMCNC: 32.8 G/DL (ref 31.4–37.4)
MCV RBC AUTO: 86 FL (ref 82–98)
MONOCYTES # BLD AUTO: 0.56 THOUSAND/ÂΜL (ref 0.17–1.22)
MONOCYTES NFR BLD AUTO: 7 % (ref 4–12)
NEUTROPHILS # BLD AUTO: 5.37 THOUSANDS/ÂΜL (ref 1.85–7.62)
NEUTS SEG NFR BLD AUTO: 72 % (ref 43–75)
NITRITE UR QL STRIP: NEGATIVE
NRBC BLD AUTO-RTO: 0 /100 WBCS
P AXIS: 43 DEGREES
PH UR STRIP.AUTO: 5.5 [PH]
PLATELET # BLD AUTO: 166 THOUSANDS/UL (ref 149–390)
PMV BLD AUTO: 12 FL (ref 8.9–12.7)
POTASSIUM SERPL-SCNC: 3.7 MMOL/L (ref 3.5–5.3)
PROT SERPL-MCNC: 7.6 G/DL (ref 6.4–8.4)
PROT UR STRIP-MCNC: NEGATIVE MG/DL
PROTHROMBIN TIME: 14.6 SECONDS (ref 12.3–15)
QRS AXIS: -74 DEGREES
QRSD INTERVAL: 194 MS
QT INTERVAL: 462 MS
QTC INTERVAL: 642 MS
RBC # BLD AUTO: 4.83 MILLION/UL (ref 3.81–5.12)
SODIUM SERPL-SCNC: 137 MMOL/L (ref 135–147)
SP GR UR STRIP.AUTO: 1 (ref 1–1.03)
T WAVE AXIS: 91 DEGREES
UROBILINOGEN UR STRIP-ACNC: <2 MG/DL
VENTRICULAR RATE: 116 BPM
WBC # BLD AUTO: 7.55 THOUSAND/UL (ref 4.31–10.16)

## 2025-02-27 PROCEDURE — 96361 HYDRATE IV INFUSION ADD-ON: CPT

## 2025-02-27 PROCEDURE — 85730 THROMBOPLASTIN TIME PARTIAL: CPT | Performed by: EMERGENCY MEDICINE

## 2025-02-27 PROCEDURE — 99284 EMERGENCY DEPT VISIT MOD MDM: CPT

## 2025-02-27 PROCEDURE — 74176 CT ABD & PELVIS W/O CONTRAST: CPT

## 2025-02-27 PROCEDURE — 85025 COMPLETE CBC W/AUTO DIFF WBC: CPT | Performed by: EMERGENCY MEDICINE

## 2025-02-27 PROCEDURE — 36415 COLL VENOUS BLD VENIPUNCTURE: CPT | Performed by: EMERGENCY MEDICINE

## 2025-02-27 PROCEDURE — 85610 PROTHROMBIN TIME: CPT | Performed by: EMERGENCY MEDICINE

## 2025-02-27 PROCEDURE — 99285 EMERGENCY DEPT VISIT HI MDM: CPT | Performed by: EMERGENCY MEDICINE

## 2025-02-27 PROCEDURE — 36000 PLACE NEEDLE IN VEIN: CPT | Performed by: EMERGENCY MEDICINE

## 2025-02-27 PROCEDURE — 83690 ASSAY OF LIPASE: CPT | Performed by: EMERGENCY MEDICINE

## 2025-02-27 PROCEDURE — 76942 ECHO GUIDE FOR BIOPSY: CPT | Performed by: EMERGENCY MEDICINE

## 2025-02-27 PROCEDURE — 93010 ELECTROCARDIOGRAM REPORT: CPT | Performed by: INTERNAL MEDICINE

## 2025-02-27 PROCEDURE — 96360 HYDRATION IV INFUSION INIT: CPT

## 2025-02-27 PROCEDURE — 84484 ASSAY OF TROPONIN QUANT: CPT | Performed by: EMERGENCY MEDICINE

## 2025-02-27 PROCEDURE — 80053 COMPREHEN METABOLIC PANEL: CPT | Performed by: EMERGENCY MEDICINE

## 2025-02-27 PROCEDURE — 93005 ELECTROCARDIOGRAM TRACING: CPT

## 2025-02-27 RX ORDER — PREDNISONE 20 MG/1
40 TABLET ORAL DAILY
Qty: 10 TABLET | Refills: 0 | Status: SHIPPED | OUTPATIENT
Start: 2025-02-27 | End: 2025-02-27

## 2025-02-27 RX ORDER — METHOCARBAMOL 750 MG/1
750 TABLET, FILM COATED ORAL 3 TIMES DAILY PRN
Qty: 15 TABLET | Refills: 0 | Status: SHIPPED | OUTPATIENT
Start: 2025-02-27

## 2025-02-27 RX ORDER — ACETAMINOPHEN 325 MG/1
975 TABLET ORAL ONCE
Status: COMPLETED | OUTPATIENT
Start: 2025-02-27 | End: 2025-02-27

## 2025-02-27 RX ORDER — PREDNISONE 20 MG/1
40 TABLET ORAL DAILY
Qty: 10 TABLET | Refills: 0 | Status: SHIPPED | OUTPATIENT
Start: 2025-02-27 | End: 2025-03-04

## 2025-02-27 RX ORDER — METHOCARBAMOL 750 MG/1
750 TABLET, FILM COATED ORAL 3 TIMES DAILY PRN
Qty: 15 TABLET | Refills: 0 | Status: SHIPPED | OUTPATIENT
Start: 2025-02-27 | End: 2025-02-27

## 2025-02-27 RX ADMIN — ACETAMINOPHEN 975 MG: 325 TABLET ORAL at 06:10

## 2025-02-27 RX ADMIN — SODIUM CHLORIDE 1000 ML: 0.9 INJECTION, SOLUTION INTRAVENOUS at 06:27

## 2025-02-27 NOTE — ED PROCEDURE NOTE
PROCEDURE  Complex Venous Access Line    Date/Time: 2/27/2025 6:26 AM    Performed by: Wilson Hoang DO  Authorized by: Wilson Hoang DO    Patient location:  ED  Other Assisting Provider: No    Consent:     Consent obtained:  Verbal  Pre-procedure details:     Skin preparation:  Alcohol    Skin preparation agent: Skin preparation agent completely dried prior to procedure    Procedure details:     Complex Venous Access Line Type: US Guided Peripheral IV      Peripheral IV Indications: other specified disorders of the vein      Orientation:  Right    Location:  Antecubital    Catheter size:  18 gauge    Patient evaluated for contraindications to access (i.e. fistula, thrombosis, etc): Yes      Site selection rationale:  Best vein visualized    Approach: percutaneous technique used      Ultrasound image availability:  Images available in PACS and still images obtained    Sterile ultrasound techniques: Sterile gel and sterile probe covers were used      Number of attempts:  1    Successful placement: yes      Landmarks identified: yes    Anesthesia (see MAR for exact dosages):     Anesthesia method:  None  Post-procedure details:     Post-procedure:  Dressing applied    Assessment:  Blood return through all ports and free fluid flow    Patient tolerance of procedure:  Tolerated well, no immediate complications       Wilson Hoang DO  02/27/25 0627
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
## 2025-02-27 NOTE — ED NOTES
Pt states that her back pain remains at 8 on the 0-10 scale.  Pt states that the tylenol did not help her pain at all  Provider notified     Tiki Batista RN  02/27/25 0736

## 2025-02-27 NOTE — ED NOTES
Received bedside report from off going RN  Care assumed at this time     Tiki Batista, RN  02/27/25 0637

## 2025-02-27 NOTE — ED CARE HANDOFF
Emergency Department Sign Out Note        Sign out and transfer of care from Dr. Gonzalez. See Separate Emergency Department note.     The patient, Lupe Menjivar, was evaluated by the previous provider for back pain.    Patient reporting pain in her left buttock with some radiation down the leg. Reviewed all findings being normal. Will treat for sciatica and have patient follow up with PCP.                            Procedures  Medical Decision Making  Amount and/or Complexity of Data Reviewed  Labs: ordered.  Radiology: ordered.    Risk  OTC drugs.            Disposition  Final diagnoses:   Back pain   Sciatica     Time reflects when diagnosis was documented in both MDM as applicable and the Disposition within this note       Time User Action Codes Description Comment    2/27/2025  9:31 AM Ramu Mullen [M54.9] Back pain     2/27/2025  9:31 AM Ramu Mullen [M54.30] Sciatica           ED Disposition       ED Disposition   Discharge    Condition   Stable    Date/Time   Thu Feb 27, 2025  9:31 AM    Comment   Lupe Menjivar discharge to home/self care.                   Follow-up Information       Follow up With Specialties Details Why Contact Info    Rhina Pettit MD Internal Medicine Schedule an appointment as soon as possible for a visit   [address] [facility] [address]  [address]  [facility] [address]  [phone]            Patient's Medications   Discharge Prescriptions    METHOCARBAMOL (ROBAXIN) 750 MG TABLET    Take 1 tablet (750 mg total) by mouth 3 (three) times a day as needed for muscle spasms       Start Date: 2/27/2025 End Date: --       Order Dose: 750 mg       Quantity: 15 tablet    Refills: 0    PREDNISONE 20 MG TABLET    Take 2 tablets (40 mg total) by mouth daily for 5 days       Start Date: 2/27/2025 End Date: 3/4/2025       Order Dose: 40 mg       Quantity: 10 tablet    Refills: 0     No discharge procedures on file.       ED Provider  Electronically Signed by     Ramu Mullen  MD  02/27/25 0936

## 2025-03-18 NOTE — DISCHARGE INSTRUCTIONS
Get repeat labs drawn in June.   Continue to monitor your blood pressure periodically and record results.  Continue to work on healthy diet and exercise.  Follow up pending lab results.  Follow up in 6 months, or sooner if symptoms persist or worsen.    Use Tylenol 650 mg every 4 hours or Anti-inflammatories like Advil, Motrin, Ibuprofen, Aleve every 6 hours; you can alternate the 2 medications taking something every 3 hours for pain. Ice to area and elevation to help with pain/swelling. Follow-up with your doctor/orthopedist in the next few days if no improvement in condition.

## 2025-03-26 ENCOUNTER — IN-CLINIC DEVICE VISIT (OUTPATIENT)
Dept: CARDIOLOGY CLINIC | Facility: CLINIC | Age: 60
End: 2025-03-26

## 2025-03-26 DIAGNOSIS — Z86.79 HISTORY OF VENTRICULAR TACHYCARDIA: Primary | ICD-10-CM

## 2025-03-26 DIAGNOSIS — I50.33 ACUTE ON CHRONIC DIASTOLIC (CONGESTIVE) HEART FAILURE (HCC): ICD-10-CM

## 2025-03-26 DIAGNOSIS — Z95.810 PRESENCE OF IMPLANTABLE CARDIOVERTER-DEFIBRILLATOR (ICD): ICD-10-CM

## 2025-03-26 NOTE — PROGRESS NOTES
Results for orders placed or performed in visit on 03/26/25   Cardiac EP device report    Narrative    MDT-DUAL CHAMBER ICD (AAI-DDD MODE) - ACTIVE SYSTEM IS MRI CONDITIONAL  DEVICE INTERROGATED IN THE [place] OFFICE. Â BATTERY VOLTAGE NEARING ARTUR. Â WILL SCHEDULE MONTHLY BATTERY CHECKS (4 MTHS-NO REMOTES-PT TO COME TO THE OFFICE MONTHLY). Â AP 26% Â  95.8%. Â ALL LEAD PARAMETERS WITHIN NORMAL LIMITS. Â NO SIGNIFICANT HIGH RATE EPISODES. 498 AT/AF EPISODES, LONGEST DURAITON >24 HRS. Â PT TAKES XARELTO, METOPROLOL SUCC. Â TIME IN AT/AF 55.5%. Â OPTI-VOL WITHIN NORMAL LIMITS. Â PT C/O FEELING SLUGGISH DURING THE DAY, ACTIVITY THRESHOLD RE-PROGRAMMED TO LOW, PT WILL MONITOR SX & REPORT @ NEXT CLINIC. Â NORMAL DEVICE FUNCTION. Â PAS

## 2025-03-29 ENCOUNTER — RESULTS FOLLOW-UP (OUTPATIENT)
Dept: CARDIOLOGY CLINIC | Facility: CLINIC | Age: 60
End: 2025-03-29

## 2025-04-08 DIAGNOSIS — I50.33 ACUTE ON CHRONIC HEART FAILURE WITH PRESERVED EJECTION FRACTION (HCC): ICD-10-CM

## 2025-04-09 RX ORDER — EMPAGLIFLOZIN 10 MG/1
10 TABLET, FILM COATED ORAL EVERY MORNING
Qty: 30 TABLET | Refills: 0 | Status: SHIPPED | OUTPATIENT
Start: 2025-04-09

## 2025-04-17 ENCOUNTER — NURSE TRIAGE (OUTPATIENT)
Age: 60
End: 2025-04-17

## 2025-04-17 NOTE — TELEPHONE ENCOUNTER
"FOLLOW UP: Verify if Lupe to continue Bumetanide; does she need updated lab work; can a new prescription be sent in please.    REASON FOR CONVERSATION: medication question  Bumetanide 2mg daily    Patient received other medications in the mail today and on the paperwork it listed Bumetanide as discontinued by Nevaeh Bettencourt. Lupe questioning if it was stopped and why.    Still listed as an active medication; no notes documenting it was to be stopped.      OTHER: Lupe still has some doses but will need it soon if she is to continue.    DISPOSITION: Callback by PCP Today/clinical staff at [phone]        Reason for Disposition   Caller has NON-URGENT medicine question about med that PCP or specialist prescribed and triager unable to answer question     Is patient to continue Bumetanide? Does she need updated blood work? Can a new prescription be sent to [facility]?    Answer Assessment - Initial Assessment Questions  1. NAME of MEDICINE: \"What medicine(s) are you calling about?\"      Bumetanide 2mg daily    2. QUESTION: \"What is your question?\" (e.g., double dose of medicine, side effect)      I received other medications today and on the paper it says this med was stopped by Nevaeh Bettencourt. Was it stopped and why?    3. PRESCRIBER: \"Who prescribed the medicine?\" Reason: if prescribed by specialist, call should be referred to that group.      Nevaeh Bettencourt    Protocols used: Medication Question Call-Adult-[place]    "

## 2025-04-19 ENCOUNTER — HOSPITAL ENCOUNTER (EMERGENCY)
Facility: HOSPITAL | Age: 60
Discharge: HOME | End: 2025-04-19
Attending: EMERGENCY MEDICINE
Payer: COMMERCIAL

## 2025-04-19 ENCOUNTER — APPOINTMENT (EMERGENCY)
Dept: RADIOLOGY | Facility: HOSPITAL | Age: 60
End: 2025-04-19
Payer: COMMERCIAL

## 2025-04-19 VITALS
RESPIRATION RATE: 18 BRPM | TEMPERATURE: 97.5 F | SYSTOLIC BLOOD PRESSURE: 143 MMHG | HEART RATE: 61 BPM | OXYGEN SATURATION: 95 % | DIASTOLIC BLOOD PRESSURE: 79 MMHG

## 2025-04-19 DIAGNOSIS — L03.90 CELLULITIS: Primary | ICD-10-CM

## 2025-04-19 LAB
ALBUMIN SERPL BCG-MCNC: 4.1 G/DL (ref 3.5–5)
ALP SERPL-CCNC: 65 U/L (ref 34–104)
ALT SERPL W P-5'-P-CCNC: 5 U/L (ref 7–52)
ANION GAP SERPL CALCULATED.3IONS-SCNC: 8 MMOL/L (ref 4–13)
APTT PPP: 35 SECONDS (ref 23–34)
AST SERPL W P-5'-P-CCNC: 13 U/L (ref 13–39)
BASOPHILS # BLD AUTO: 0.03 THOUSANDS/ÂΜL (ref 0–0.1)
BASOPHILS NFR BLD AUTO: 1 % (ref 0–1)
BILIRUB SERPL-MCNC: 0.59 MG/DL (ref 0.2–1)
BUN SERPL-MCNC: 8 MG/DL (ref 5–25)
CALCIUM SERPL-MCNC: 9.2 MG/DL (ref 8.4–10.2)
CHLORIDE SERPL-SCNC: 101 MMOL/L (ref 96–108)
CO2 SERPL-SCNC: 30 MMOL/L (ref 21–32)
CREAT SERPL-MCNC: 1.75 MG/DL (ref 0.6–1.3)
EOSINOPHIL # BLD AUTO: 0.11 THOUSAND/ÂΜL (ref 0–0.61)
EOSINOPHIL NFR BLD AUTO: 3 % (ref 0–6)
ERYTHROCYTE [DISTWIDTH] IN BLOOD BY AUTOMATED COUNT: 14 % (ref 11.6–15.1)
GFR SERPL CREATININE-BSD FRML MDRD: 31 ML/MIN/1.73SQ M
GLUCOSE SERPL-MCNC: 100 MG/DL (ref 65–140)
HCT VFR BLD AUTO: 38.5 % (ref 34.8–46.1)
HGB BLD-MCNC: 12.2 G/DL (ref 11.5–15.4)
IMM GRANULOCYTES # BLD AUTO: 0.01 THOUSAND/UL (ref 0–0.2)
IMM GRANULOCYTES NFR BLD AUTO: 0 % (ref 0–2)
INR PPP: 1.4 (ref 0.85–1.19)
LACTATE SERPL-SCNC: 0.9 MMOL/L (ref 0.5–2)
LYMPHOCYTES # BLD AUTO: 0.99 THOUSANDS/ÂΜL (ref 0.6–4.47)
LYMPHOCYTES NFR BLD AUTO: 23 % (ref 14–44)
MCH RBC QN AUTO: 27.2 PG (ref 26.8–34.3)
MCHC RBC AUTO-ENTMCNC: 31.7 G/DL (ref 31.4–37.4)
MCV RBC AUTO: 86 FL (ref 82–98)
MONOCYTES # BLD AUTO: 0.39 THOUSAND/ÂΜL (ref 0.17–1.22)
MONOCYTES NFR BLD AUTO: 9 % (ref 4–12)
NEUTROPHILS # BLD AUTO: 2.8 THOUSANDS/ÂΜL (ref 1.85–7.62)
NEUTS SEG NFR BLD AUTO: 64 % (ref 43–75)
NRBC BLD AUTO-RTO: 0 /100 WBCS
PLATELET # BLD AUTO: 164 THOUSANDS/UL (ref 149–390)
PMV BLD AUTO: 12.2 FL (ref 8.9–12.7)
POTASSIUM SERPL-SCNC: 2.9 MMOL/L (ref 3.5–5.3)
PROCALCITONIN SERPL-MCNC: 0.06 NG/ML
PROT SERPL-MCNC: 7.3 G/DL (ref 6.4–8.4)
PROTHROMBIN TIME: 17.4 SECONDS (ref 12.3–15)
RBC # BLD AUTO: 4.48 MILLION/UL (ref 3.81–5.12)
SODIUM SERPL-SCNC: 139 MMOL/L (ref 135–147)
WBC # BLD AUTO: 4.33 THOUSAND/UL (ref 4.31–10.16)

## 2025-04-19 PROCEDURE — 36415 COLL VENOUS BLD VENIPUNCTURE: CPT

## 2025-04-19 PROCEDURE — 85610 PROTHROMBIN TIME: CPT

## 2025-04-19 PROCEDURE — 83605 ASSAY OF LACTIC ACID: CPT

## 2025-04-19 PROCEDURE — 96375 TX/PRO/DX INJ NEW DRUG ADDON: CPT

## 2025-04-19 PROCEDURE — 87040 BLOOD CULTURE FOR BACTERIA: CPT

## 2025-04-19 PROCEDURE — 99283 EMERGENCY DEPT VISIT LOW MDM: CPT

## 2025-04-19 PROCEDURE — 85730 THROMBOPLASTIN TIME PARTIAL: CPT

## 2025-04-19 PROCEDURE — 84145 PROCALCITONIN (PCT): CPT

## 2025-04-19 PROCEDURE — 99284 EMERGENCY DEPT VISIT MOD MDM: CPT | Performed by: EMERGENCY MEDICINE

## 2025-04-19 PROCEDURE — 80053 COMPREHEN METABOLIC PANEL: CPT

## 2025-04-19 PROCEDURE — 96365 THER/PROPH/DIAG IV INF INIT: CPT

## 2025-04-19 PROCEDURE — 96368 THER/DIAG CONCURRENT INF: CPT

## 2025-04-19 PROCEDURE — 74176 CT ABD & PELVIS W/O CONTRAST: CPT

## 2025-04-19 PROCEDURE — 85025 COMPLETE CBC W/AUTO DIFF WBC: CPT

## 2025-04-19 RX ORDER — DIPHENHYDRAMINE HYDROCHLORIDE 50 MG/ML
50 INJECTION, SOLUTION INTRAMUSCULAR; INTRAVENOUS ONCE
Status: DISCONTINUED | OUTPATIENT
Start: 2025-04-19 | End: 2025-04-19

## 2025-04-19 RX ORDER — HYDROMORPHONE HCL/PF 1 MG/ML
0.5 SYRINGE (ML) INJECTION ONCE
Status: COMPLETED | OUTPATIENT
Start: 2025-04-19 | End: 2025-04-19

## 2025-04-19 RX ORDER — HYDROCORTISONE SODIUM SUCCINATE 100 MG/2ML
200 INJECTION INTRAMUSCULAR; INTRAVENOUS ONCE
Status: DISCONTINUED | OUTPATIENT
Start: 2025-04-19 | End: 2025-04-19

## 2025-04-19 RX ORDER — ONDANSETRON 2 MG/ML
4 INJECTION INTRAMUSCULAR; INTRAVENOUS ONCE
Status: COMPLETED | OUTPATIENT
Start: 2025-04-19 | End: 2025-04-19

## 2025-04-19 RX ORDER — POTASSIUM CHLORIDE 14.9 MG/ML
20 INJECTION INTRAVENOUS ONCE
Status: DISCONTINUED | OUTPATIENT
Start: 2025-04-19 | End: 2025-04-19 | Stop reason: HOSPADM

## 2025-04-19 RX ORDER — POTASSIUM CHLORIDE 1500 MG/1
40 TABLET, EXTENDED RELEASE ORAL ONCE
Status: COMPLETED | OUTPATIENT
Start: 2025-04-19 | End: 2025-04-19

## 2025-04-19 RX ORDER — SODIUM CHLORIDE, SODIUM GLUCONATE, SODIUM ACETATE, POTASSIUM CHLORIDE, MAGNESIUM CHLORIDE, SODIUM PHOSPHATE, DIBASIC, AND POTASSIUM PHOSPHATE .53; .5; .37; .037; .03; .012; .00082 G/100ML; G/100ML; G/100ML; G/100ML; G/100ML; G/100ML; G/100ML
1000 INJECTION, SOLUTION INTRAVENOUS ONCE
Status: COMPLETED | OUTPATIENT
Start: 2025-04-19 | End: 2025-04-19

## 2025-04-19 RX ADMIN — SODIUM CHLORIDE 3 G: 9 INJECTION, SOLUTION INTRAVENOUS at 13:34

## 2025-04-19 RX ADMIN — ONDANSETRON 4 MG: 2 INJECTION INTRAMUSCULAR; INTRAVENOUS at 14:34

## 2025-04-19 RX ADMIN — SODIUM CHLORIDE, SODIUM GLUCONATE, SODIUM ACETATE, POTASSIUM CHLORIDE, MAGNESIUM CHLORIDE, SODIUM PHOSPHATE, DIBASIC, AND POTASSIUM PHOSPHATE 1000 ML: .53; .5; .37; .037; .03; .012; .00082 INJECTION, SOLUTION INTRAVENOUS at 13:40

## 2025-04-19 RX ADMIN — HYDROMORPHONE HYDROCHLORIDE 0.5 MG: 1 INJECTION, SOLUTION INTRAMUSCULAR; INTRAVENOUS; SUBCUTANEOUS at 12:44

## 2025-04-19 RX ADMIN — POTASSIUM CHLORIDE 40 MEQ: 1500 TABLET, EXTENDED RELEASE ORAL at 14:34

## 2025-04-19 NOTE — DISCHARGE INSTRUCTIONS
You been seen in the emergency department for evaluation of a soft tissue swelling.  It appears that this is cellulitis.  Please take antibiotics as prescribed until completion.  Please follow-up with your primary care doctor regarding rechecks.  Return to the emergency department if you develop worsening of symptoms or fevers.

## 2025-04-19 NOTE — ED NOTES
Holding off on PO potassium due to pt vomiting up her meal     Jessica Shelby, RN  04/19/25 3535

## 2025-04-19 NOTE — ED ATTENDING ATTESTATION
4/19/2025  I, Natalie Cooper MD, saw and evaluated the patient. I have discussed the patient with the resident/non-physician practitioner and agree with the resident's/non-physician practitioner's findings, Plan of Care, and MDM as documented in the resident's/non-physician practitioner's note, except where noted. All available labs and Radiology studies were reviewed.  I was present for key portions of any procedure(s) performed by the resident/non-physician practitioner and I was immediately available to provide assistance.       At this point I agree with the current assessment done in the Emergency Department.  I have conducted an independent evaluation of this patient a history and physical is as follows:  Patient with swelling and pain as well as redness to right inner thigh/buttock area.  Patient noticed the symptoms a couple of days ago and states that it is expanded and become very hard.  Very tender to touch.  Patient is early diabetic.  No fevers, chills, drainage.  Does have pain when she defecates.  No urinary symptoms.  No high risk features.  On exam the patient has induration, swelling, and erythema to her medial right buttock with no subcutaneous air.  Bedside ultrasound with cobblestoning but no abscess.MEDICAL DECISION MAKING    Number and Complexity of Problems  Differential diagnosis: Abscess, necrotizing soft tissue infection, doubt sepsis, doubt perirectal source    Medical Decision Making Data  External documents reviewed: Prior A1c reviewed, prior A1c is 5.3  My EKG interpretation:   My CT interpretation:   My X-ray interpretation:   My ultrasound interpretation:     CT abdomen pelvis wo contrast   Final Result      No findings of necrotizing soft tissue infection.      Punctate nonobstructing calculus in the right renal collecting system. No hydronephrosis or ureteral calculus.      Scattered colonic diverticulosis without evidence of diverticulitis.      Workstation performed:  [id number]             Labs Reviewed   COMPREHENSIVE METABOLIC PANEL - Abnormal       Result Value Ref Range Status    Sodium 139  135 - 147 mmol/L Final    Potassium 2.9 (*) 3.5 - 5.3 mmol/L Final    Chloride 101  96 - 108 mmol/L Final    CO2 30  21 - 32 mmol/L Final    ANION GAP 8  4 - 13 mmol/L Final    BUN 8  5 - 25 mg/dL Final    Creatinine 1.75 (*) 0.60 - 1.30 mg/dL Final    Comment: Standardized to IDMS reference method    Glucose 100  65 - 140 mg/dL Final    Comment: If the patient is fasting, the ADA then defines impaired fasting glucose as > 100 mg/dL and diabetes as > or equal to 123 mg/dL.    Calcium 9.2  8.4 - 10.2 mg/dL Final    AST 13  13 - 39 U/L Final    ALT 5 (*) 7 - 52 U/L Final    Comment: Specimen collection should occur prior to Sulfasalazine administration due to the potential for falsely depressed results.     Alkaline Phosphatase 65  34 - 104 U/L Final    Total Protein 7.3  6.4 - 8.4 g/dL Final    Albumin 4.1  3.5 - 5.0 g/dL Final    Total Bilirubin 0.59  0.20 - 1.00 mg/dL Final    Comment: Use of this assay is not recommended for patients undergoing treatment with eltrombopag due to the potential for falsely elevated results.  N-acetyl-p-benzoquinone imine (metabolite of Acetaminophen) will generate erroneously low results in samples for patients that have taken an overdose of Acetaminophen.    eGFR 31  ml/min/1.73sq m Final    Narrative:     [employer] guidelines for Chronic Kidney Disease (CKD):     Stage 1 with normal or high GFR (GFR > 90 mL/min/1.73 square meters)    Stage 2 Mild CKD (GFR = 60-89 mL/min/1.73 square meters)    Stage 3A Moderate CKD (GFR = 45-59 mL/min/1.73 square meters)    Stage 3B Moderate CKD (GFR = 30-44 mL/min/1.73 square meters)    Stage 4 Severe CKD (GFR = 15-29 mL/min/1.73 square meters)    Stage 5 End Stage CKD (GFR <15 mL/min/1.73 square meters)  Note: GFR calculation is accurate only with a steady state creatinine   PROTIME-INR -  Abnormal    Protime 17.4 (*) 12.3 - 15.0 seconds Final    INR 1.40 (*) 0.85 - 1.19 Final    Narrative:     INR Therapeutic Range    Indication                                             INR Range      Atrial Fibrillation                                               2.0-3.0  Hypercoagulable State                                    2.0.2.3  Left Ventricular Asist Device                            2.0-3.0  Mechanical Heart Valve                                  -    Aortic(with afib, MI, embolism, HF, LA enlargement,    and/or coagulopathy)                                     2.0-3.0 (2.5-3.5)     Mitral                                                             2.5-3.5  Prosthetic/Bioprosthetic Heart Valve               2.0-3.0  Venous thromboembolism (VTE: VT, PE        2.0-3.0   APTT - Abnormal    PTT 35 (*) 23 - 34 seconds Final    Comment: Therapeutic Heparin Range =  60-90 seconds   LACTIC ACID, PLASMA (W/REFLEX IF RESULT > 2.0) - Normal    LACTIC ACID 0.9  0.5 - 2.0 mmol/L Final    Narrative:     Result may be elevated if tourniquet was used during collection.   PROCALCITONIN TEST - Normal    Procalcitonin 0.06  <=0.25 ng/ml Final    Comment: Suspected Lower Respiratory Tract Infection (LRTI):  - LESS than or EQUAL to 0.25 ng/mL:   low likelihood for bacterial LRTI; antibiotics DISCOURAGED.  - GREATER than 0.25 ng/mL:   increased likelihood for bacterial LRTI; antibiotics ENCOURAGED.    Suspected Sepsis:  - Strongly consider initiating antibiotics in ALL UNSTABLE patients.  - LESS than or EQUAL to 0.5 ng/mL:   low likelihood for bacterial sepsis; antibiotics DISCOURAGED.  - GREATER than 0.5 ng/mL:   increased likelihood for bacterial sepsis; antibiotics ENCOURAGED.  - GREATER than 2 ng/mL:   high risk for severe sepsis / septic shock; antibiotics strongly ENCOURAGED.    Decisions on antibiotic use should not be based solely on Procalcitonin (PCT) levels. If PCT is low but uncertainty exists with stopping  antibiotics, repeat PCT in 6-24 hours to confirm the low level. If antibiotics are administered (regardless if initial PCT was high or low), repeat PCT every 1-2 days to consider early antibiotic cessation (when GREATER than 80% decrease from the peak OR when PCT drops below designated cutoffs, whichever comes first), so long as the infection is NOT one that typically requires prolonged treatment durations (e.g., bone/joint infections, endocarditis, Staph. aureus bacteremia).    Situations of FALSE-POSITIVE Procalcitonin values:  1) Newborns < 72 hours old  2) Massive stress from severe trauma / burns, major surgery, acute pancreatitis, cardiogenic / hemorrhagic shock, sickle cell crisis, or other organ perfusion abnormalities  3) Malaria and some Candidal infections  4) Treatment with agents that stimulate cytokines (e.g., OKT3, anti-lymphocyte globulins, alemtuzumab, IL-2, granulocyte transfusion [NOT GCSFs])  5) Chronic renal disease causes elevated baseline levels (consider GREATER than 0.75 ng/mL as an abnormal cut-off); initiating HD/CRRT may cause transient decreases  6) Paraneoplastic syndromes from medullary thyroid or SCLC, some forms of vasculitis, and acute [id number] disease    Situations of FALSE-NEGATIVE Procalcitonin values:  1) Too early in clinical course for PCT to have reached its peak (may repeat in 6-24 hours to confirm low level)  2) Localized infection WITHOUT systemic (SIRS / sepsis) response (e.g., an abscess, osteomyelitis, cystitis)  3) Mycobacteria (e.g., Tuberculosis, MAC)  4) Cystic fibrosis exacerbations     CBC AND DIFFERENTIAL    WBC 4.33  4.31 - 10.16 Thousand/uL Final    RBC 4.48  3.81 - 5.12 Million/uL Final    Hemoglobin 12.2  11.5 - 15.4 g/dL Final    Hematocrit 38.5  34.8 - 46.1 % Final    MCV 86  82 - 98 fL Final    MCH 27.2  26.8 - 34.3 pg Final    MCHC 31.7  31.4 - 37.4 g/dL Final    RDW 14.0  11.6 - 15.1 % Final    MPV 12.2  8.9 - 12.7 fL Final    Platelets 164  149 -  390 Thousands/uL Final    nRBC 0  /100 WBCs Final    Segmented % 64  43 - 75 % Final    Immature Grans % 0  0 - 2 % Final    Lymphocytes % 23  14 - 44 % Final    Monocytes % 9  4 - 12 % Final    Eosinophils Relative 3  0 - 6 % Final    Basophils Relative 1  0 - 1 % Final    Absolute Neutrophils 2.80  1.85 - 7.62 Thousands/µL Final    Absolute Immature Grans 0.01  0.00 - 0.20 Thousand/uL Final    Absolute Lymphocytes 0.99  0.60 - 4.47 Thousands/µL Final    Absolute Monocytes 0.39  0.17 - 1.22 Thousand/µL Final    Eosinophils Absolute 0.11  0.00 - 0.61 Thousand/µL Final    Basophils Absolute 0.03  0.00 - 0.10 Thousands/µL Final       Labs reviewed by me are significant for:     Clinical decision rules/scores are significant for:     Discussed case with:   Considered admission for:     Treatment and Disposition  ED course: Patient seen and examined.  Reassuring labs, CT negative.  Will plan to treat with antibiotics, given first dose in the emergency department  Shared decision making:   Code status:     ED Course         Critical Care Time  Procedures
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
## 2025-04-24 LAB
BACTERIA BLD CULT: NORMAL
BACTERIA BLD CULT: NORMAL

## 2025-04-30 ENCOUNTER — IN-CLINIC DEVICE VISIT (OUTPATIENT)
Dept: CARDIOLOGY CLINIC | Facility: CLINIC | Age: 60
End: 2025-04-30

## 2025-04-30 ENCOUNTER — TELEPHONE (OUTPATIENT)
Dept: CARDIOLOGY CLINIC | Facility: CLINIC | Age: 60
End: 2025-04-30

## 2025-04-30 DIAGNOSIS — Z95.810 AICD (AUTOMATIC CARDIOVERTER/DEFIBRILLATOR) PRESENT: Primary | ICD-10-CM

## 2025-04-30 PROCEDURE — RECHECK: Performed by: INTERNAL MEDICINE

## 2025-04-30 NOTE — TELEPHONE ENCOUNTER
optivol  Received: Today  Yi Griffin, RN  [facility] Clinical Support Staff  Fyi:    OPTI-VOL FLUID THRESHOLD CROSSED ON 4/9/25 & ONGOING. PT C/O INTERMITTENT PEDAL EDEMA & SOB. PT ON BUMEX. WILL RECHECK IN 1 MONTH.    Thanks,  Mitzi

## 2025-04-30 NOTE — TELEPHONE ENCOUNTER
F/U call to patient who stated she does experience intermittent swelling to her legs & hands, that comes & goes. Stated the intermittent swelling extends from her calves to her toes.   When she is on her feet, the swelling to her lower extremities gets much worse.  Some mornings when she wakes, her hands are swollen.    Stated she has continuous exertional dyspnea. Really SOB when climbing a flight of stairs, having to stop about every 10 steps too catch her breath.    Stated she does not weigh herself daily.  (Encouraged her to do so)  Stated her weight about 4-5 days ago was 201.6 lb.  Weighed herself today while on the phone, Wt - 199.9 lb.  Stated she is not experiencing edema to lower extremities today or swelling in hands but continues with exertional dyspnea.  Also stated it hurts posteriorly where her ribs/lungs are located.  Does not follow a low sodium diet. Does not follow fluid restrictions.  Reviewed all cardiac medications & is taking as ordered.    Discussed with patient overdue 6 month f/u OV around 1/18/25, as per Dr Diamond's instruction notes from 7/18/24 OV.   Scheduled patient for urgent OV with Dr Diamond on 5/9/25.    Please advise.

## 2025-04-30 NOTE — PROGRESS NOTES
Results for orders placed or performed in visit on 04/30/25   Cardiac EP device report    Narrative    MDT-DUAL CHAMBER ICD (DDDR MODE) - ACTIVE SYSTEM IS MRI CONDITIONAL  DEVICE INTERROGATED IN THE [place] OFFICE TO CHECK BATTERY STATUS-NB. BATTERY VOLTAGE NEARING ARTUR (3 MOS). WILL SCHEDULE MONTHLY BATTERY CHECKS. AP-55%, >99% (>40% DDDR@60PPM). ALL AVAILABLE LEAD PARAMETERS WITHIN NORMAL LIMITS. 1 NSVT EPISODE- 14 BEATS, RATE ACCELERATED  BPM. PT ASYMPTOMATIC W/ THIS EPISODE. 1,244 AF EPISODES, AVG HR- 60-76 BPM, MAX DURATION 16 HRS. AF BURDEN SINCE 3/26/25- 35.4%. PT ON XARELTO & METOPROLOL. PVC SINGLES COUNT SINCE 3/26/25- 6.8/H. OPTI-VOL FLUID THRESHOLD CROSSED ON 4/9/25 & ONGOING. PT C/O INTERMITTENT PEDAL EDEMA & SOB. PT ON BUMEX. WILL RECHECK IN 1 MONTH. TASKED HF TEAM. NORMAL DEVICE FUNCTION. GV

## 2025-05-04 ENCOUNTER — RESULTS FOLLOW-UP (OUTPATIENT)
Dept: CARDIOLOGY CLINIC | Facility: CLINIC | Age: 60
End: 2025-05-04

## 2025-05-07 DIAGNOSIS — I50.33 ACUTE ON CHRONIC HEART FAILURE WITH PRESERVED EJECTION FRACTION (HCC): ICD-10-CM

## 2025-05-08 RX ORDER — EMPAGLIFLOZIN 10 MG/1
10 TABLET, FILM COATED ORAL EVERY MORNING
Qty: 30 TABLET | Refills: 5 | Status: SHIPPED | OUTPATIENT
Start: 2025-05-08

## 2025-05-22 NOTE — PROGRESS NOTES
In basket receieved Valarie Laughlin was admitted from 10/04-10/07for chest pain and lower back pain . She was evaluated and then discharged home . [address] had attempted outreach to patient to follow up on referral on 10/05 but was unable to reach, this was during a time she was hospitalized. Patient is need of housing and SNAP assistance. HALEY SMITH will continue to follow & remain available as needed. Other...

## 2025-05-28 ENCOUNTER — RESULTS FOLLOW-UP (OUTPATIENT)
Dept: CARDIOLOGY CLINIC | Facility: CLINIC | Age: 60
End: 2025-05-28

## 2025-05-28 ENCOUNTER — IN-CLINIC DEVICE VISIT (OUTPATIENT)
Dept: CARDIOLOGY CLINIC | Facility: CLINIC | Age: 60
End: 2025-05-28

## 2025-05-28 DIAGNOSIS — Z95.810 PRESENCE OF IMPLANTABLE CARDIOVERTER-DEFIBRILLATOR (ICD): ICD-10-CM

## 2025-05-28 DIAGNOSIS — I48.0 PAROXYSMAL A-FIB (HCC): Primary | ICD-10-CM

## 2025-05-28 NOTE — PROGRESS NOTES
Results for orders placed or performed in visit on 05/28/25   Cardiac EP device report    Narrative    MDT-DUAL CHAMBER ICD (DDDR MODE) - ACTIVE SYSTEM IS MRI CONDITIONAL  NON-BILLABLE  DEVICE INTERROGATED IN THE [facility] OFFICE.  BATTERY VOLTAGE NEARING ARTUR (2 MTHS).   WILL SCHEDULE MONTHLY BATTERY CHECKS.  AP 75.2%   98.8%. ALL LEAD PARAMETERS WITHIN NORMAL LIMITS.  NO SIGNIFICANT HIGH RATE EPISODES. 505 AT/AF EPISODES, LONGEST EPISODE 3 HRS.  PT TAKES XARELTO, METOPROLOL SUCC, BUMETANIDE, JARDIANCE.  EF 60% (ECHO 6/14/2024).  PVC COUNT 6.8/HR (SINGLES).   TIME IN AT/AF 9.9%.  OPTI-VOL FLUID THRESHOLD CROSSED SINCE 4/9/2025 & ONGOING. TASK TO HF TEAM, RECHECK IN ONE MONTH.  NO PROGRAMMING CHANGES MADE TO DEVICE PARAMETERS.  NORMAL DEVICE FUNCTION.  PAS

## 2025-06-18 ENCOUNTER — HOSPITAL ENCOUNTER (EMERGENCY)
Facility: HOSPITAL | Age: 60
Discharge: HOME | End: 2025-06-18
Attending: EMERGENCY MEDICINE
Payer: COMMERCIAL

## 2025-06-18 ENCOUNTER — APPOINTMENT (EMERGENCY)
Dept: RADIOLOGY | Facility: HOSPITAL | Age: 60
End: 2025-06-18
Payer: COMMERCIAL

## 2025-06-18 VITALS
RESPIRATION RATE: 16 BRPM | HEART RATE: 70 BPM | OXYGEN SATURATION: 96 % | TEMPERATURE: 97.8 F | SYSTOLIC BLOOD PRESSURE: 215 MMHG | DIASTOLIC BLOOD PRESSURE: 93 MMHG

## 2025-06-18 DIAGNOSIS — S99.929A TOE INJURY: Primary | ICD-10-CM

## 2025-06-18 PROCEDURE — 73630 X-RAY EXAM OF FOOT: CPT

## 2025-06-18 PROCEDURE — 99284 EMERGENCY DEPT VISIT MOD MDM: CPT | Performed by: EMERGENCY MEDICINE

## 2025-06-18 PROCEDURE — 99283 EMERGENCY DEPT VISIT LOW MDM: CPT
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
## 2025-06-18 NOTE — Clinical Note
Lupe Menjivar was seen and treated in our emergency department on 6/18/2025.                Diagnosis:     Lupe  may return to work on return date.    She may return on this date: 06/20/2025         If you have any questions or concerns, please don't hesitate to call.      Bj Churchill MD    ______________________________           _______________          _______________  Hospital Representative                              Date                                Time

## 2025-06-18 NOTE — ED ATTENDING ATTESTATION
6/18/2025  I, Jordana Lang MD, saw and evaluated the patient. I have discussed the patient with the resident/non-physician practitioner and agree with the resident's/non-physician practitioner's findings, Plan of Care, and MDM as documented in the resident's/non-physician practitioner's note, except where noted. All available labs and Radiology studies were reviewed.  I was present for key portions of any procedure(s) performed by the resident/non-physician practitioner and I was immediately available to provide assistance.       At this point I agree with the current assessment done in the Emergency Department.  I have conducted an independent evaluation of this patient a history and physical is as follows:    ED Course         Critical Care Time  Procedures    61 yo female with right little toe pain after kicking a door inadvertently.  Pt able to ambulate.  Vss, afebrile, lungs cta, rrr, right 5th digit eccymosis, tenderness, nvi.  Xray,

## 2025-06-30 ENCOUNTER — APPOINTMENT (EMERGENCY)
Dept: RADIOLOGY | Facility: HOSPITAL | Age: 60
End: 2025-06-30
Payer: COMMERCIAL

## 2025-06-30 ENCOUNTER — HOSPITAL ENCOUNTER (EMERGENCY)
Facility: HOSPITAL | Age: 60
Discharge: HOME | End: 2025-06-30
Attending: EMERGENCY MEDICINE
Payer: COMMERCIAL

## 2025-06-30 VITALS
RESPIRATION RATE: 20 BRPM | HEART RATE: 60 BPM | DIASTOLIC BLOOD PRESSURE: 91 MMHG | TEMPERATURE: 98 F | SYSTOLIC BLOOD PRESSURE: 154 MMHG | OXYGEN SATURATION: 99 %

## 2025-06-30 DIAGNOSIS — R00.2 PALPITATIONS: Primary | ICD-10-CM

## 2025-06-30 LAB
2HR DELTA HS TROPONIN: -5 NG/L
ALBUMIN SERPL BCG-MCNC: 4.3 G/DL (ref 3.5–5)
ALP SERPL-CCNC: 83 U/L (ref 34–104)
ALT SERPL W P-5'-P-CCNC: 8 U/L (ref 7–52)
ANION GAP SERPL CALCULATED.3IONS-SCNC: 8 MMOL/L (ref 4–13)
AST SERPL W P-5'-P-CCNC: 13 U/L (ref 13–39)
ATRIAL RATE: 63 BPM
BASOPHILS # BLD AUTO: 0.02 THOUSANDS/ÂΜL (ref 0–0.1)
BASOPHILS NFR BLD AUTO: 0 % (ref 0–1)
BILIRUB SERPL-MCNC: 0.31 MG/DL (ref 0.2–1)
BNP SERPL-MCNC: 290 PG/ML (ref 0–100)
BUN SERPL-MCNC: 22 MG/DL (ref 5–25)
CALCIUM SERPL-MCNC: 9.3 MG/DL (ref 8.4–10.2)
CARDIAC TROPONIN I PNL SERPL HS: 47 NG/L (ref ?–50)
CARDIAC TROPONIN I PNL SERPL HS: 52 NG/L (ref ?–50)
CHLORIDE SERPL-SCNC: 106 MMOL/L (ref 96–108)
CO2 SERPL-SCNC: 25 MMOL/L (ref 21–32)
CREAT SERPL-MCNC: 1.88 MG/DL (ref 0.6–1.3)
EOSINOPHIL # BLD AUTO: 0.04 THOUSAND/ÂΜL (ref 0–0.61)
EOSINOPHIL NFR BLD AUTO: 1 % (ref 0–6)
ERYTHROCYTE [DISTWIDTH] IN BLOOD BY AUTOMATED COUNT: 14.6 % (ref 11.6–15.1)
GFR SERPL CREATININE-BSD FRML MDRD: 28 ML/MIN/1.73SQ M
GLUCOSE SERPL-MCNC: 108 MG/DL (ref 65–140)
HCT VFR BLD AUTO: 44.6 % (ref 34.8–46.1)
HGB BLD-MCNC: 14.3 G/DL (ref 11.5–15.4)
IMM GRANULOCYTES # BLD AUTO: 0.02 THOUSAND/UL (ref 0–0.2)
IMM GRANULOCYTES NFR BLD AUTO: 0 % (ref 0–2)
LYMPHOCYTES # BLD AUTO: 0.93 THOUSANDS/ÂΜL (ref 0.6–4.47)
LYMPHOCYTES NFR BLD AUTO: 15 % (ref 14–44)
MCH RBC QN AUTO: 26.7 PG (ref 26.8–34.3)
MCHC RBC AUTO-ENTMCNC: 32.1 G/DL (ref 31.4–37.4)
MCV RBC AUTO: 83 FL (ref 82–98)
MONOCYTES # BLD AUTO: 0.37 THOUSAND/ÂΜL (ref 0.17–1.22)
MONOCYTES NFR BLD AUTO: 6 % (ref 4–12)
NEUTROPHILS # BLD AUTO: 4.75 THOUSANDS/ÂΜL (ref 1.85–7.62)
NEUTS SEG NFR BLD AUTO: 78 % (ref 43–75)
NRBC BLD AUTO-RTO: 0 /100 WBCS
P AXIS: 17 DEGREES
PLATELET # BLD AUTO: 148 THOUSANDS/UL (ref 149–390)
PMV BLD AUTO: 11.5 FL (ref 8.9–12.7)
POTASSIUM SERPL-SCNC: 3.7 MMOL/L (ref 3.5–5.3)
PROT SERPL-MCNC: 7.7 G/DL (ref 6.4–8.4)
QRS AXIS: -74 DEGREES
QRSD INTERVAL: 182 MS
QT INTERVAL: 482 MS
QTC INTERVAL: 498 MS
RBC # BLD AUTO: 5.35 MILLION/UL (ref 3.81–5.12)
SODIUM SERPL-SCNC: 139 MMOL/L (ref 135–147)
T WAVE AXIS: 100 DEGREES
VENTRICULAR RATE: 64 BPM
WBC # BLD AUTO: 6.13 THOUSAND/UL (ref 4.31–10.16)

## 2025-06-30 PROCEDURE — 71046 X-RAY EXAM CHEST 2 VIEWS: CPT

## 2025-06-30 PROCEDURE — 96360 HYDRATION IV INFUSION INIT: CPT

## 2025-06-30 PROCEDURE — 83880 ASSAY OF NATRIURETIC PEPTIDE: CPT

## 2025-06-30 PROCEDURE — 93005 ELECTROCARDIOGRAM TRACING: CPT

## 2025-06-30 PROCEDURE — 84484 ASSAY OF TROPONIN QUANT: CPT

## 2025-06-30 PROCEDURE — 85025 COMPLETE CBC W/AUTO DIFF WBC: CPT

## 2025-06-30 PROCEDURE — 99285 EMERGENCY DEPT VISIT HI MDM: CPT

## 2025-06-30 PROCEDURE — 36415 COLL VENOUS BLD VENIPUNCTURE: CPT

## 2025-06-30 PROCEDURE — 80053 COMPREHEN METABOLIC PANEL: CPT

## 2025-06-30 PROCEDURE — 93010 ELECTROCARDIOGRAM REPORT: CPT | Performed by: INTERNAL MEDICINE

## 2025-06-30 PROCEDURE — 99285 EMERGENCY DEPT VISIT HI MDM: CPT | Performed by: EMERGENCY MEDICINE

## 2025-06-30 RX ORDER — DIPHENHYDRAMINE HCL 25 MG
12.5 TABLET ORAL ONCE
Status: COMPLETED | OUTPATIENT
Start: 2025-06-30 | End: 2025-06-30

## 2025-06-30 RX ADMIN — DIPHENHYDRAMINE HCL 12.5 MG: 25 TABLET ORAL at 09:48

## 2025-06-30 RX ADMIN — SODIUM CHLORIDE 250 ML: 0.9 INJECTION, SOLUTION INTRAVENOUS at 09:48

## 2025-06-30 NOTE — DISCHARGE INSTRUCTIONS
Hello you were seen today for palpitations    Please follow-up with your cardiology appointment on Wednesday    Please also follow-up with your family doctor    Please return if you have any fever, chills, worsening chest pain, shortness of breath, passing out, palpitations again, any new symptoms

## 2025-06-30 NOTE — Clinical Note
Lupe Menjivar was seen and treated in our emergency department on 6/30/2025.                Diagnosis:     Lupe  .    She may return on this date: 07/01/2025         If you have any questions or concerns, please don't hesitate to call.      Shubham Zapata MD    ______________________________           _______________          _______________  Hospital Representative                              Date                                Time

## 2025-07-02 ENCOUNTER — IN-CLINIC DEVICE VISIT (OUTPATIENT)
Dept: CARDIOLOGY CLINIC | Facility: CLINIC | Age: 60
End: 2025-07-02
Payer: COMMERCIAL

## 2025-07-02 DIAGNOSIS — Z95.810 PRESENCE OF AUTOMATIC CARDIOVERTER/DEFIBRILLATOR (AICD): Primary | ICD-10-CM

## 2025-07-02 PROCEDURE — 93289 INTERROG DEVICE EVAL HEART: CPT | Performed by: INTERNAL MEDICINE

## 2025-07-02 NOTE — PROGRESS NOTES
"Results for orders placed or performed in visit on 07/02/25   Cardiac EP device report    Narrative    MDT-DUAL CHAMBER ICD (DDDR MODE) - ACTIVE SYSTEM IS MRI CONDITIONAL  DEVICE INTERROGATED IN THE [place] OFFICE. \"NO TEST\" BATTERY CHECK. BATTERY VOLTAGE NEARING ARTUR (2 MO). WILL SCHEDULE MONTHLY BATTERY CHECKS. AP 24.3%.  99.2%. (>40%/DDDR 60 PPM, -180 ms). ALL LEAD PARAMETERS WITHIN NORMAL LIMITS. 1 EPISODE OF AF FOR 29 H ADDRESSED AT [facility] ON 6/30/25 WITH [employer]. PVC BURDEN 0.4%. PT TAKES XARELTO & METOPROLOL SUCCINATE. OPTI-VOL WITHIN NORMAL LIMITS. INCREASE MADE TO RV AMPLITUDE TO PROVIDE ADEQUATE SAFETY MARGIN. NORMAL DEVICE FUNCTION. [facility]         "

## 2025-07-02 NOTE — ED ATTENDING ATTESTATION
6/30/2025  I, Amy Perez DO, saw and evaluated the patient. I have discussed the patient with the resident/non-physician practitioner and agree with the resident's/non-physician practitioner's findings, Plan of Care, and MDM as documented in the resident's/non-physician practitioner's note, except where noted. All available labs and Radiology studies were reviewed.  I was present for key portions of any procedure(s) performed by the resident/non-physician practitioner and I was immediately available to provide assistance.       At this point I agree with the current assessment done in the Emergency Department.  I have conducted an independent evaluation of this patient a history and physical is as follows:    Chief Complaint   Patient presents with    Rapid Heart Rate     PT woke up this morning with a feeling that her heart is racing. Also c/o dizziness when standing. PT has pacemaker and has appointment Tuesday to have battery changed. -CP     60-year-old female presents with sensation that her heart was racing this morning.  Patient has a pacemaker, she has appointment to get battery replaced this week.  Patient denies chest pain.  She does not feel short of breath, denies feeling lightheaded.  She reports that the symptoms have improved.  On exam-no acute distress, heart regular, no respiratory distress.  Plan-palpitations/racing heart, will interrogate pacemaker, check electrolytes and CBC, will monitor in the ED.      ED Course         Critical Care Time  Procedures
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
## 2025-07-10 ENCOUNTER — TELEPHONE (OUTPATIENT)
Age: 60
End: 2025-07-10

## 2025-07-10 NOTE — TELEPHONE ENCOUNTER
"Patient called stating her device battery is low and \" something went off in her chest  Stated it was not her defibrillator   Warm transfer to [facility] in the EP Device triage  "

## 2025-07-11 ENCOUNTER — IN-CLINIC DEVICE VISIT (OUTPATIENT)
Dept: CARDIOLOGY CLINIC | Facility: CLINIC | Age: 60
End: 2025-07-11

## 2025-07-11 DIAGNOSIS — Z95.0 PRESENCE OF CARDIAC PACEMAKER: Primary | ICD-10-CM

## 2025-07-11 DIAGNOSIS — Z86.79 HISTORY OF VENTRICULAR TACHYCARDIA: ICD-10-CM

## 2025-07-11 DIAGNOSIS — I48.0 PAROXYSMAL A-FIB (HCC): ICD-10-CM

## 2025-07-11 NOTE — ASSESSMENT & PLAN NOTE
Prior afib ablation + AV node ablation  Current medication regimen:  AC: xarelto 15mg daily ([id number] 3 (sex, CHF, HTN))  AAD: None  Rate: metoprolol succinate 50mg daily

## 2025-07-11 NOTE — ASSESSMENT & PLAN NOTE
[employer] dual chamber ICD Implanted 7/13/2016 for VT  EKG QRS today 194ms  Latest 07/11/25 Interrogation:  Reached RRT 7/10/25  all leads with appropriate impedence, capture, and sensitivity  A-paced 59%  V-paced 99.5%  Programed DDDR 60bpm  6/2024 ECHO: EF 60%

## 2025-07-11 NOTE — ASSESSMENT & PLAN NOTE
Wt Readings from Last 3 Encounters:   01/30/25 94.8 kg (209 lb 1.6 oz)   01/16/25 93 kg (205 lb)   12/18/24 93 kg (205 lb)     ECHO (06/2024) - EF 60%  Appears euvolemic in office today  Continue general cardiology f/u
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
## 2025-07-11 NOTE — PROGRESS NOTES
Results for orders placed or performed in visit on 07/11/25   Cardiac EP device report    Narrative    MDT-DUAL CHAMBER ICD (DDDR MODE) - ACTIVE SYSTEM IS MRI CONDITIONAL  NON-BILLABLE  NO TEST   DEVICE INTERROGATED IN THE [place] OFFICE.  BATTERY IS NOW @ RRT SINCE 7/10/2025. AP 59%  99.5%.  ALL LEAD PARAMETERS WITHIN NORMAL LIMITS.  NO SIGNIFICANT HIGH RATE EPISODES. OPTI-VOL WITHIN NORMAL LIMITS.  NO PROGRAMMING CHANGES MADE TO DEVICE PARAMETERS. PT SCHED FOR H&P FOR DEVICE REPLACEMENT.  NORMAL DEVICE FUNCTION.  PAS

## 2025-07-21 ENCOUNTER — PREP FOR PROCEDURE (OUTPATIENT)
Dept: CARDIOLOGY CLINIC | Facility: CLINIC | Age: 60
End: 2025-07-21

## 2025-07-21 ENCOUNTER — OFFICE VISIT (OUTPATIENT)
Dept: CARDIOLOGY CLINIC | Facility: CLINIC | Age: 60
End: 2025-07-21
Payer: COMMERCIAL

## 2025-07-21 ENCOUNTER — TELEPHONE (OUTPATIENT)
Dept: CARDIOLOGY CLINIC | Facility: CLINIC | Age: 60
End: 2025-07-21

## 2025-07-21 VITALS
HEART RATE: 68 BPM | SYSTOLIC BLOOD PRESSURE: 138 MMHG | DIASTOLIC BLOOD PRESSURE: 88 MMHG | HEIGHT: 67 IN | BODY MASS INDEX: 32.33 KG/M2 | WEIGHT: 206 LBS

## 2025-07-21 DIAGNOSIS — I10 PRIMARY HYPERTENSION: ICD-10-CM

## 2025-07-21 DIAGNOSIS — Z86.79 HISTORY OF VENTRICULAR TACHYCARDIA: ICD-10-CM

## 2025-07-21 DIAGNOSIS — I48.0 PAROXYSMAL A-FIB (HCC): Primary | ICD-10-CM

## 2025-07-21 DIAGNOSIS — I50.32 CHRONIC HEART FAILURE WITH PRESERVED EJECTION FRACTION (HCC): ICD-10-CM

## 2025-07-21 DIAGNOSIS — G47.33 OBSTRUCTIVE SLEEP APNEA, ADULT: ICD-10-CM

## 2025-07-21 DIAGNOSIS — Z95.810 PRESENCE OF AUTOMATIC CARDIOVERTER/DEFIBRILLATOR (AICD): Primary | ICD-10-CM

## 2025-07-21 DIAGNOSIS — I48.0 PAROXYSMAL A-FIB (HCC): ICD-10-CM

## 2025-07-21 LAB
ATRIAL RATE: 68 BPM
P AXIS: 76 DEGREES
PR INTERVAL: 170 MS
QRS AXIS: -75 DEGREES
QRSD INTERVAL: 194 MS
QT INTERVAL: 500 MS
QTC INTERVAL: 532 MS
T WAVE AXIS: 100 DEGREES
VENTRICULAR RATE: 68 BPM

## 2025-07-21 PROCEDURE — 99214 OFFICE O/P EST MOD 30 MIN: CPT

## 2025-07-21 PROCEDURE — 93000 ELECTROCARDIOGRAM COMPLETE: CPT

## 2025-07-21 NOTE — Clinical Note
Sent this patient downstairs to scheduled her MDT DC ICD gen change.  I included this information in my note but forgot to write it on her sheet. Would see if the performing physician can do a pocket revision at time of her procedure as she feels the ICD is mobile and can move uncomfortably with certain positional changes.

## 2025-07-21 NOTE — TELEPHONE ENCOUNTER
----- Message from Danie Inman PA-C sent at 7/21/2025 11:11 AM EDT -----  Sent this patient downstairs to scheduled her MDT DC ICD gen change.    I included this information in my note but forgot to write it on her sheet. Would see if the performing physician can do a pocket revision at time of her procedure as she feels the ICD is mobile and can move uncomfortably with certain positional changes.

## 2025-07-21 NOTE — LETTER
2025               DEVICE PROCEDURE INSTRUCTIONS    Lupe Menjivar   : 1965  MRN: [id number]  [address]  [place] [address]    Procedure: DUAL CHAMBER ICD GEN CHANGE / POCKET REVISION      Procedure Date: 25     Location: [facility]  [facility] [place] [address]        Labs to be done NO LATER THAN 8/15/25 : CMP /  CBC (FASTING 8 HOURS)    BLOOD THINNER INSTRUCTIONS (Coumadin / Warfarin / Pradaxa / Eliquis / Xarelto):     XARELTO -  Continue taking as prescribed, I will call with instructions for this medication after provider advise.     Medication Hold:     Jardiance - Do not take for 4 days before and morning of procedure.     Bumex - Do not take morning of procedure.         Arrival Time: The Hospital will contact you the day prior to your procedure, usually between 4PM - 6PM to instruct you on the time and place to report. If you do not hear from a [facility]  by 6PM the evening prior to your procedure, please contact the [place] you are scheduled at.     Bring your smart phone with you on day of the procedure. Will need to know your apple/android account password and be able to download an al from the al/google store. Be sure to have enough storage space for the application.    DO NOT drink or eat ANYTHING after midnight the night prior to your procedure. You may have a minimal amount of water with your AM medications.    Arrange for a responsible adult to drive you to and from the hospital.    You should continue to take your morning medications with a sip of water UNLESS ADVISED OTHERWISE.       DO NOT stop taking Plavix or Aspirin unless advised otherwise.     If you are currently taking Fish Oil, Krill oil and/or Vitamin E please DO NOT TAKE FOR A WEEK PRIOR TO PROCEDURE.     If you are diabetic, DO NOT take any of your diabetic pills the morning of your procedure. Oral diabetic medications may include Glucophage, Prandin,  Glyburide, Micronase, Avandia, Glucovance, Precose, Glynase, and Starlix.     Bring a list of daily medications, vitamins, minerals, herbals and nutritional supplements you take. Please include dosages and the times you take them each day.     If you are packing an overnight bag, pack minimal clothing, you will be given hospital sleepwear.   DO NOT bring money, valuables or jewelry. The wedding band is ok.     If you use CPAP machine, bring it to the hospital.     Bring your Photo ID and Insurance cards with you.     Please notify us if you have been admitted to the hospital within 30 days.    Pre-Operative Showering Instructions. ONLY FOR DEVICE PROCEDURE.    The two nights prior to your procedure, take a shower each night using the special antiseptic body scrub (Chlorhexidine Scrub Brush) you received from the office or hospital. This scrub will replace your soap at home, the sponge is pre-filled with soap.     The scrub brushes are single use only and should be discarded after use.     Shampoo your hair with regular shampoo and rinse completely before using the antiseptic scrub brush.     Using the sponge side of the scrub brush to wash your entire body from your neck down, with special attention to your armpits, chest and groin area. DO NOT USE the scrub on your face and avoid any open wounds. Do not use any other soap or wash your skin.    DO NOT USE any lotion, powder, deodorant or perfume of any kind on your skin after you shower.    AFTER THE FIRST NIGHT of using the scrub, change your bed linen sheets to a fresh clean set and clean pajamas for bedtime.    AFTER THE SECOND NIGHT of using the scrub, use clean pajamas for bedtime.    DO NOT SHOWER THE MORNING OF SURGERY, you will be prepped and cleansed at the hospital prior to your procedure.       PLEASE  THE SP[facility] AT YOUR MOST CONVENIENT [facility] CARDIOLOGY OFFICE.                    FAILURE TO FOLLOW ANY OF THESE INSTRUCTIONS COULD RESULT IN THE  CANCELLATION OF YOUR PROCEDURE      Please call  [phone] ([place]) or [phone] ([place]) if you do not hear from the  by 6:00PM the night before your procedure.    All patients enter through ENTRANCE B.  Parking is available free of charge or park on Parking Deck B.      PREDNISONE 20mg take THREE tablets the night prior of the procedure and THREE tablets the morning of the procedure.    PEPCID 20mg take ONE tablet the night prior of the procedure and ONE tablet the morning of the procedure.    BENADRYL 25mg take ONE tablet the night prior of the procedure and ONE tablet the morning of the procedure. (Over the counter)        Thank you,   Lesly Avendano  Surgery Coordinator  [facility]   [address] [place] [address]  NICHOLAS Brasher [address]  Teams: [phone]

## 2025-07-21 NOTE — TELEPHONE ENCOUNTER
Patient scheduled for DUAL CHAMBER ICD GEN CHANGE/ POCKET REVISION device on 8/19/25 at [facility] with Dr. CRUZ.      Patient aware of all general instructions.    Medication holds:   Jardiance - Do not take for 4 days before and morning of procedure.     Bumex - Do not take morning of procedure.     DR. CRUZ PLEASE ADVISE ON XARELTO HOLD.     Labs to be done NO LATER THAN 8/12/25   CMP / CBC (FASTING 8 HOURS)      Thank you,  Lesly Avendano     Called in Contrast dye allergy prep into [facility].

## 2025-07-22 ENCOUNTER — HOSPITAL ENCOUNTER (OUTPATIENT)
Dept: NON INVASIVE DIAGNOSTICS | Facility: HOSPITAL | Age: 60
Discharge: HOME | End: 2025-07-22
Payer: COMMERCIAL

## 2025-07-22 VITALS
HEART RATE: 88 BPM | BODY MASS INDEX: 32.32 KG/M2 | HEIGHT: 67 IN | WEIGHT: 205.91 LBS | DIASTOLIC BLOOD PRESSURE: 88 MMHG | SYSTOLIC BLOOD PRESSURE: 138 MMHG

## 2025-07-22 DIAGNOSIS — Z86.79 HISTORY OF VENTRICULAR TACHYCARDIA: ICD-10-CM

## 2025-07-22 DIAGNOSIS — I50.32 CHRONIC HEART FAILURE WITH PRESERVED EJECTION FRACTION (HCC): ICD-10-CM

## 2025-07-22 LAB
BSA FOR ECHO PROCEDURE: 2.05 M2
E WAVE DECELERATION TIME: 283 MS
E/A RATIO: 1.28
INTERVENTRICULAR SEPTUM IN DIASTOLE (PARASTERNAL SHORT AXIS VIEW): 1.3 CM
IVC: 19 MM
LEFT VENTRICULAR INTERNAL DIMENSION IN DIASTOLE: 4.2 CM (ref 3.5–6)
LEFT VENTRICULAR POSTERIOR WALL IN END DIASTOLE: 1.3 CM
LV EF US.2D.A4C+ESTIMATED: 35 %
MV PEAK A VEL: 0.54 M/S
MV PEAK E VEL: 69 CM/S
MV STENOSIS PRESSURE HALF TIME: 82 MS
MV VALVE AREA P 1/2 METHOD: 2.68
RA PRESSURE ESTIMATED: 3 MMHG
RIGHT VENTRICLE ID DIMENSION: 3.5 CM
RV PSP: 20 MMHG
SL CV LV EF: 40
TR MAX PG: 17 MMHG
TR PEAK VELOCITY: 2.1 M/S
TRICUSPID VALVE PEAK REGURGITATION VELOCITY: 2.09 M/S

## 2025-07-22 PROCEDURE — 93308 TTE F-UP OR LMTD: CPT | Performed by: INTERNAL MEDICINE

## 2025-07-22 PROCEDURE — 93308 TTE F-UP OR LMTD: CPT

## 2025-07-22 RX ADMIN — PERFLUTREN 0.6 ML/MIN: 6.52 INJECTION, SUSPENSION INTRAVENOUS at 14:17

## 2025-07-23 ENCOUNTER — RESULTS FOLLOW-UP (OUTPATIENT)
Dept: CARDIOLOGY CLINIC | Facility: CLINIC | Age: 60
End: 2025-07-23

## 2025-07-23 DIAGNOSIS — Z95.810 IMPLANTABLE CARDIOVERTER-DEFIBRILLATOR (ICD) IN SITU: Primary | ICD-10-CM

## 2025-07-23 NOTE — TELEPHONE ENCOUNTER
Called and spoke to patient. Patient verbalized understanding of testing needed. Number for scheduling provided to patient for her to schedule.

## 2025-07-23 NOTE — TELEPHONE ENCOUNTER
----- Message from Danie Inman PA-C sent at 7/23/2025 11:25 AM EDT -----  Thanks Melanie!    Clinical team: mind letting the patient know that I have ordered the IR venogram to evaluate the veins in her upper extremities prior to her surgery?    Thanks!  ----- Message -----  From: Baudilio Joiner MD  Sent: 7/23/2025   8:10 AM EDT  To: Lesly Avendano; Fredis Diamond MD; Jus#    Going through records for this patient  - I cannot find a note if I saw this patient      She has ICD in place and also AVN ablation - 2 procedures done by Melanie and Steven      Current situation with falling EF - upgrade necessary     However decision needs to be made by physicians who follow this patient       Will await their decision         ----- Message -----  From: Lesly Avendano  Sent: 7/23/2025   7:35 AM EDT  To: Baudilio Joiner MD    ----- Message from Lesly Avendano sent at 7/23/2025  7:35 AM EDT -----      ----- Message -----  From: Stacy Dunham  Sent: 7/22/2025   5:36 PM EDT  To: Lesly Avendano      ----- Message -----  From: Danie Inman PA-C  Sent: 7/22/2025   4:07 PM EDT  To: Fredis Diamond MD; Baudilio Joiner MD;#    Patient is scheduled for gen change of her MDT DC ICD 8/22/25 w/ Dr. Joiner    V-paced 99.5% w/ 194ms QRS on office EKG    Repeat ECHO ordered and performed today showing EF newly decreased to 40% (was 65% in 2024)    Called and updated the patient regarding these results - explained that we may need to add a LV lead during time of gen change - patient agreeable.    Dr. Joiner, are you able to review? If appropriate for LV lead addition then would ask that the surgical team update her scheduled procedure to include both gen change and BI-V ICD upgrade     Clinical team - would you mind updating the patient with Dr. Joiner's recommendations after his review?    Dr. Diamond: including you as an FYI.    Thanks everyone!   ----- Message -----  From: Regla Yang MD  Sent: 7/22/2025   3:27 PM EDT  To: Danie  Bassam Inman PA-C

## 2025-07-23 NOTE — TELEPHONE ENCOUNTER
Dr. Joiner please advise..If appropriate for LV lead addition then would ask that the surgical team update her scheduled procedure to include both gen change and BI-V ICD upgrade     Thank you,   Lesly

## 2025-07-23 NOTE — TELEPHONE ENCOUNTER
----- Message from Stacy SILVA sent at 7/22/2025  5:35 PM EDT -----    ----- Message -----  From: Danie Inman PA-C  Sent: 7/22/2025   4:07 PM EDT  To: Fredis Diamond MD; Baudilio Joiner MD;#    Patient is scheduled for gen change of her MDT DC ICD 8/22/25 w/ Dr. Joiner    V-paced 99.5% w/ 194ms QRS on office EKG    Repeat ECHO ordered and performed today showing EF newly decreased to 40% (was 65% in 2024)    Called and updated the patient regarding these results - explained that we may need to add a LV lead during time of gen change - patient agreeable.    Dr. Joiner, are you able to review? If appropriate for LV lead addition then would ask that the surgical team update her scheduled procedure to include both gen change and BI-V ICD upgrade     Clinical team - would you mind updating the patient with Dr. Joiner's recommendations after his review?    Dr. Diamond: including you as an FYI.    Thanks everyone!   ----- Message -----  From: Regla Yang MD  Sent: 7/22/2025   3:27 PM EDT  To: Danie Inman PA-C

## 2025-08-07 ENCOUNTER — VBI (OUTPATIENT)
Dept: ADMINISTRATIVE | Facility: OTHER | Age: 60
End: 2025-08-07

## 2025-08-08 ENCOUNTER — HOSPITAL ENCOUNTER (OUTPATIENT)
Dept: RADIOLOGY | Facility: HOSPITAL | Age: 60
Discharge: HOME | End: 2025-08-08
Payer: COMMERCIAL

## 2025-08-08 DIAGNOSIS — Z95.810 IMPLANTABLE CARDIOVERTER-DEFIBRILLATOR (ICD) IN SITU: ICD-10-CM

## 2025-08-08 PROCEDURE — 75827 VEIN X-RAY CHEST: CPT

## 2025-08-08 PROCEDURE — NC001 PR NO CHARGE: Performed by: RADIOLOGY

## 2025-08-08 PROCEDURE — 36005 INJECTION EXT VENOGRAPHY: CPT

## 2025-08-11 ENCOUNTER — RESULTS FOLLOW-UP (OUTPATIENT)
Dept: NON INVASIVE DIAGNOSTICS | Facility: CLINIC | Age: 60
End: 2025-08-11

## 2025-08-11 ENCOUNTER — APPOINTMENT (OUTPATIENT)
Dept: LAB | Facility: CLINIC | Age: 60
End: 2025-08-11
Attending: INTERNAL MEDICINE
Payer: COMMERCIAL

## 2025-08-21 ENCOUNTER — ANESTHESIA (OUTPATIENT)
Dept: NON INVASIVE DIAGNOSTICS | Facility: HOSPITAL | Age: 60
End: 2025-08-21
Payer: COMMERCIAL

## 2025-08-21 ENCOUNTER — ANESTHESIA EVENT (OUTPATIENT)
Dept: NON INVASIVE DIAGNOSTICS | Facility: HOSPITAL | Age: 60
End: 2025-08-21
Payer: COMMERCIAL

## 2025-08-21 RX ORDER — PROPOFOL 10 MG/ML
INJECTION, EMULSION INTRAVENOUS AS NEEDED
Status: DISCONTINUED | OUTPATIENT
Start: 2025-08-21 | End: 2025-08-21

## 2025-08-21 RX ORDER — SODIUM CHLORIDE, SODIUM LACTATE, POTASSIUM CHLORIDE, CALCIUM CHLORIDE 600; 310; 30; 20 MG/100ML; MG/100ML; MG/100ML; MG/100ML
INJECTION, SOLUTION INTRAVENOUS CONTINUOUS PRN
Status: DISCONTINUED | OUTPATIENT
Start: 2025-08-21 | End: 2025-08-21

## 2025-08-21 RX ORDER — LIDOCAINE HCL/PF 100 MG/5ML
SYRINGE (ML) INJECTION AS NEEDED
Status: DISCONTINUED | OUTPATIENT
Start: 2025-08-21 | End: 2025-08-21

## 2025-08-21 RX ORDER — ONDANSETRON 2 MG/ML
INJECTION INTRAMUSCULAR; INTRAVENOUS AS NEEDED
Status: DISCONTINUED | OUTPATIENT
Start: 2025-08-21 | End: 2025-08-21

## 2025-08-21 RX ORDER — FENTANYL CITRATE 50 UG/ML
INJECTION, SOLUTION INTRAMUSCULAR; INTRAVENOUS AS NEEDED
Status: DISCONTINUED | OUTPATIENT
Start: 2025-08-21 | End: 2025-08-21

## 2025-08-21 RX ADMIN — PHENYLEPHRINE HYDROCHLORIDE 20 MCG/MIN: 50 INJECTION INTRAVENOUS at 15:10

## 2025-08-21 RX ADMIN — ONDANSETRON 4 MG: 2 INJECTION INTRAMUSCULAR; INTRAVENOUS at 14:24

## 2025-08-21 RX ADMIN — SODIUM CHLORIDE, SODIUM LACTATE, POTASSIUM CHLORIDE, AND CALCIUM CHLORIDE: .6; .31; .03; .02 INJECTION, SOLUTION INTRAVENOUS at 14:13

## 2025-08-21 RX ADMIN — CEFAZOLIN SODIUM 2000 MG: 2 SOLUTION INTRAVENOUS at 14:32

## 2025-08-21 RX ADMIN — FAMOTIDINE 20 MG: 10 INJECTION, SOLUTION INTRAVENOUS at 14:19

## 2025-08-21 RX ADMIN — PROPOFOL 80 MCG/KG/MIN: 10 INJECTION, EMULSION INTRAVENOUS at 14:35

## 2025-08-21 RX ADMIN — FENTANYL CITRATE 25 MCG: 50 INJECTION INTRAMUSCULAR; INTRAVENOUS at 14:35

## 2025-08-21 RX ADMIN — PROPOFOL 40 MG: 10 INJECTION, EMULSION INTRAVENOUS at 14:32

## 2025-08-21 RX ADMIN — LIDOCAINE HYDROCHLORIDE 50 MG: 20 INJECTION INTRAVENOUS at 14:32

## 2025-08-26 PROBLEM — L03.90 CELLULITIS: Status: ACTIVE | Noted: 2025-08-26

## 2025-08-26 PROBLEM — T82.7XXA: Status: RESOLVED | Noted: 2025-08-26 | Resolved: 2025-08-26

## 2025-08-26 PROBLEM — T82.7XXA: Status: ACTIVE | Noted: 2025-08-26

## 2025-08-26 PROBLEM — K21.00 GASTROESOPHAGEAL REFLUX DISEASE WITH ESOPHAGITIS: Status: ACTIVE | Noted: 2025-08-26

## 2025-08-27 PROBLEM — T82.7XXA INFECTION OF PACEMAKER POCKET (HCC): Status: ACTIVE | Noted: 2025-08-26

## 2025-08-27 PROBLEM — I50.42 CHRONIC COMBINED SYSTOLIC AND DIASTOLIC CONGESTIVE HEART FAILURE (HCC): Status: ACTIVE | Noted: 2019-01-23

## (undated) DEVICE — BLADE 1882923HRE SKIMMER 22CM 2.9MM M4: Brand: SKIMMER

## (undated) DEVICE — 3M™ DURAPORE™ SURGICAL TAPE 2 INCHES X 10YARDS (5.0CM X 9.1M) 6ROLLS/CARTON 10CARTONS/CASE 1538-2: Brand: 3M™ DURAPORE™

## (undated) DEVICE — GUIDE SHEATH SLO 8.5 FR

## (undated) DEVICE — CATH ABLATION SAFIRE TX 8MM LRG CURL

## (undated) DEVICE — ANTI-FOG SOLUTION WITH FOAM PAD: Brand: DEVON

## (undated) DEVICE — REF PATCH ENSITE PRECISION

## (undated) DEVICE — TUBING SET COOL POINT

## (undated) DEVICE — SCD SEQUENTIAL COMPRESSION COMFORT SLEEVE MEDIUM KNEE LENGTH: Brand: KENDALL SCD

## (undated) DEVICE — SPECIMEN CONTAINER STERILE PEEL PACK

## (undated) DEVICE — INTRO SHEATH 8FR 24CM ARROW-FLEX

## (undated) DEVICE — BETHLEHEM UNIVERSAL OUTPATIENT: Brand: CARDINAL HEALTH

## (undated) DEVICE — CATH EP  INQUIRY 6F 2-5-2MM  LRG CRV STERABLE DECAPOLAR 110CM

## (undated) DEVICE — NEEDLE TRANSSEPTAL BRK-1 71CM

## (undated) DEVICE — PINNACLE INTRODUCER SHEATH: Brand: PINNACLE

## (undated) DEVICE — Device

## (undated) DEVICE — CATH EP ADVISOR HD GRID MAPPING 8F

## (undated) DEVICE — SYRINGE 3ML LL

## (undated) DEVICE — SYRINGE 10ML LL CONTROL TOP

## (undated) DEVICE — GUIDE SHEATH SRO 8.5 FR

## (undated) DEVICE — CATH ABLATION TACTICATH SE BI-DIR FJ CRV

## (undated) DEVICE — CATH EP  INQUIRY 6F 2-5-2MM  MED CRV STERABLE  DECAPOLAR 110CM

## (undated) DEVICE — VIAL DECANTER

## (undated) DEVICE — CATH ULTRASOUND ACUNAV ICE 8FR 90CM GE VIVID-I

## (undated) DEVICE — TUBING SUCTION 5MM X 12 FT

## (undated) DEVICE — DEVICE LMA MADGIC LARYNGO TRACHEAL MUCOSAL 8.5 IN